# Patient Record
Sex: MALE | Race: WHITE | NOT HISPANIC OR LATINO | Employment: OTHER | URBAN - METROPOLITAN AREA
[De-identification: names, ages, dates, MRNs, and addresses within clinical notes are randomized per-mention and may not be internally consistent; named-entity substitution may affect disease eponyms.]

---

## 2017-12-12 ENCOUNTER — HOSPITAL ENCOUNTER (OUTPATIENT)
Dept: RADIOLOGY | Facility: MEDICAL CENTER | Age: 47
End: 2017-12-12
Attending: FAMILY MEDICINE
Payer: COMMERCIAL

## 2017-12-12 DIAGNOSIS — R10.11 RIGHT UPPER QUADRANT PAIN: ICD-10-CM

## 2017-12-12 PROCEDURE — 76700 US EXAM ABDOM COMPLETE: CPT

## 2018-06-30 ENCOUNTER — HOSPITAL ENCOUNTER (INPATIENT)
Facility: MEDICAL CENTER | Age: 48
LOS: 6 days | DRG: 475 | End: 2018-07-06
Attending: EMERGENCY MEDICINE | Admitting: HOSPITALIST
Payer: COMMERCIAL

## 2018-06-30 ENCOUNTER — APPOINTMENT (OUTPATIENT)
Dept: RADIOLOGY | Facility: MEDICAL CENTER | Age: 48
DRG: 475 | End: 2018-06-30
Attending: EMERGENCY MEDICINE
Payer: COMMERCIAL

## 2018-06-30 DIAGNOSIS — T14.8XXA OPEN WOUND: ICD-10-CM

## 2018-06-30 DIAGNOSIS — Z89.431 S/P TRANSMETATARSAL AMPUTATION OF FOOT, RIGHT (HCC): ICD-10-CM

## 2018-06-30 DIAGNOSIS — M86.271 SUBACUTE OSTEOMYELITIS OF RIGHT FOOT (HCC): ICD-10-CM

## 2018-06-30 DIAGNOSIS — E11.628 TYPE 2 DIABETES MELLITUS WITH OTHER SKIN COMPLICATION, WITHOUT LONG-TERM CURRENT USE OF INSULIN (HCC): ICD-10-CM

## 2018-06-30 DIAGNOSIS — Z89.411 STATUS POST AMPUTATION OF GREAT TOE, RIGHT (HCC): ICD-10-CM

## 2018-06-30 DIAGNOSIS — E86.0 DEHYDRATION: ICD-10-CM

## 2018-06-30 PROBLEM — E66.3 OVERWEIGHT (BMI 25.0-29.9): Status: ACTIVE | Noted: 2018-06-30

## 2018-06-30 PROBLEM — I10 DIABETES MELLITUS WITH COINCIDENT HYPERTENSION (HCC): Status: ACTIVE | Noted: 2018-06-30

## 2018-06-30 PROBLEM — D64.9 NORMOCYTIC ANEMIA: Status: ACTIVE | Noted: 2018-06-30

## 2018-06-30 PROBLEM — E11.9 DIABETES MELLITUS WITH COINCIDENT HYPERTENSION (HCC): Status: ACTIVE | Noted: 2018-06-30

## 2018-06-30 PROBLEM — E11.65 TYPE 2 DIABETES MELLITUS WITH HYPERGLYCEMIA, WITHOUT LONG-TERM CURRENT USE OF INSULIN (HCC): Status: ACTIVE | Noted: 2018-06-30

## 2018-06-30 LAB
ALBUMIN SERPL BCP-MCNC: 2.7 G/DL (ref 3.2–4.9)
ALBUMIN/GLOB SERPL: 0.7 G/DL
ALP SERPL-CCNC: 39 U/L (ref 30–99)
ALT SERPL-CCNC: 8 U/L (ref 2–50)
ANION GAP SERPL CALC-SCNC: 13 MMOL/L (ref 0–11.9)
AST SERPL-CCNC: 12 U/L (ref 12–45)
B-OH-BUTYR SERPL-MCNC: 3.16 MMOL/L (ref 0.02–0.27)
BASE EXCESS BLDV CALC-SCNC: -1 MMOL/L
BASOPHILS # BLD AUTO: 0.7 % (ref 0–1.8)
BASOPHILS # BLD: 0.07 K/UL (ref 0–0.12)
BILIRUB SERPL-MCNC: 0.7 MG/DL (ref 0.1–1.5)
BODY TEMPERATURE: ABNORMAL CENTIGRADE
BUN SERPL-MCNC: 7 MG/DL (ref 8–22)
CALCIUM SERPL-MCNC: 8.4 MG/DL (ref 8.5–10.5)
CHLORIDE SERPL-SCNC: 102 MMOL/L (ref 96–112)
CO2 SERPL-SCNC: 23 MMOL/L (ref 20–33)
CREAT SERPL-MCNC: 0.89 MG/DL (ref 0.5–1.4)
CRP SERPL HS-MCNC: 7.23 MG/DL (ref 0–0.75)
EOSINOPHIL # BLD AUTO: 0.29 K/UL (ref 0–0.51)
EOSINOPHIL NFR BLD: 2.9 % (ref 0–6.9)
ERYTHROCYTE [DISTWIDTH] IN BLOOD BY AUTOMATED COUNT: 36.3 FL (ref 35.9–50)
GLOBULIN SER CALC-MCNC: 3.8 G/DL (ref 1.9–3.5)
GLUCOSE SERPL-MCNC: 133 MG/DL (ref 65–99)
HCO3 BLDV-SCNC: 23 MMOL/L (ref 24–28)
HCT VFR BLD AUTO: 38.2 % (ref 42–52)
HGB BLD-MCNC: 12.9 G/DL (ref 14–18)
IMM GRANULOCYTES # BLD AUTO: 0.05 K/UL (ref 0–0.11)
IMM GRANULOCYTES NFR BLD AUTO: 0.5 % (ref 0–0.9)
LACTATE BLD-SCNC: 1.7 MMOL/L (ref 0.5–2)
LYMPHOCYTES # BLD AUTO: 2.16 K/UL (ref 1–4.8)
LYMPHOCYTES NFR BLD: 22 % (ref 22–41)
MCH RBC QN AUTO: 28.5 PG (ref 27–33)
MCHC RBC AUTO-ENTMCNC: 33.8 G/DL (ref 33.7–35.3)
MCV RBC AUTO: 84.5 FL (ref 81.4–97.8)
MONOCYTES # BLD AUTO: 1.01 K/UL (ref 0–0.85)
MONOCYTES NFR BLD AUTO: 10.3 % (ref 0–13.4)
NEUTROPHILS # BLD AUTO: 6.26 K/UL (ref 1.82–7.42)
NEUTROPHILS NFR BLD: 63.6 % (ref 44–72)
NRBC # BLD AUTO: 0 K/UL
NRBC BLD-RTO: 0 /100 WBC
PCO2 BLDV: 35.2 MMHG (ref 41–51)
PH BLDV: 7.43 [PH] (ref 7.31–7.45)
PLATELET # BLD AUTO: 308 K/UL (ref 164–446)
PMV BLD AUTO: 9.4 FL (ref 9–12.9)
PO2 BLDV: 36.7 MMHG (ref 25–40)
POTASSIUM SERPL-SCNC: 3.8 MMOL/L (ref 3.6–5.5)
PROT SERPL-MCNC: 6.5 G/DL (ref 6–8.2)
RBC # BLD AUTO: 4.52 M/UL (ref 4.7–6.1)
SAO2 % BLDV: 71.7 %
SODIUM SERPL-SCNC: 138 MMOL/L (ref 135–145)
WBC # BLD AUTO: 9.8 K/UL (ref 4.8–10.8)

## 2018-06-30 PROCEDURE — 700111 HCHG RX REV CODE 636 W/ 250 OVERRIDE (IP): Performed by: EMERGENCY MEDICINE

## 2018-06-30 PROCEDURE — 80053 COMPREHEN METABOLIC PANEL: CPT

## 2018-06-30 PROCEDURE — 83605 ASSAY OF LACTIC ACID: CPT

## 2018-06-30 PROCEDURE — 83036 HEMOGLOBIN GLYCOSYLATED A1C: CPT

## 2018-06-30 PROCEDURE — 82803 BLOOD GASES ANY COMBINATION: CPT

## 2018-06-30 PROCEDURE — 700105 HCHG RX REV CODE 258: Performed by: EMERGENCY MEDICINE

## 2018-06-30 PROCEDURE — 85652 RBC SED RATE AUTOMATED: CPT

## 2018-06-30 PROCEDURE — 99285 EMERGENCY DEPT VISIT HI MDM: CPT

## 2018-06-30 PROCEDURE — 73630 X-RAY EXAM OF FOOT: CPT | Mod: RT

## 2018-06-30 PROCEDURE — 96375 TX/PRO/DX INJ NEW DRUG ADDON: CPT

## 2018-06-30 PROCEDURE — 99223 1ST HOSP IP/OBS HIGH 75: CPT | Performed by: HOSPITALIST

## 2018-06-30 PROCEDURE — 36415 COLL VENOUS BLD VENIPUNCTURE: CPT

## 2018-06-30 PROCEDURE — 85025 COMPLETE CBC W/AUTO DIFF WBC: CPT

## 2018-06-30 PROCEDURE — 87040 BLOOD CULTURE FOR BACTERIA: CPT | Mod: 91

## 2018-06-30 PROCEDURE — 770006 HCHG ROOM/CARE - MED/SURG/GYN SEMI*

## 2018-06-30 PROCEDURE — 86140 C-REACTIVE PROTEIN: CPT

## 2018-06-30 PROCEDURE — 96361 HYDRATE IV INFUSION ADD-ON: CPT

## 2018-06-30 PROCEDURE — 82010 KETONE BODYS QUAN: CPT

## 2018-06-30 RX ORDER — SODIUM CHLORIDE 9 MG/ML
1000 INJECTION, SOLUTION INTRAVENOUS ONCE
Status: COMPLETED | OUTPATIENT
Start: 2018-06-30 | End: 2018-06-30

## 2018-06-30 RX ORDER — OXYCODONE HYDROCHLORIDE 5 MG/1
2.5 TABLET ORAL
Status: DISCONTINUED | OUTPATIENT
Start: 2018-06-30 | End: 2018-07-01

## 2018-06-30 RX ORDER — DEXTROSE MONOHYDRATE 25 G/50ML
25 INJECTION, SOLUTION INTRAVENOUS
Status: DISCONTINUED | OUTPATIENT
Start: 2018-06-30 | End: 2018-07-06 | Stop reason: HOSPADM

## 2018-06-30 RX ORDER — GLYBURIDE 5 MG/1
5 TABLET ORAL
COMMUNITY
End: 2018-08-15

## 2018-06-30 RX ORDER — PROMETHAZINE HYDROCHLORIDE 12.5 MG/1
12.5-25 SUPPOSITORY RECTAL EVERY 4 HOURS PRN
Status: DISCONTINUED | OUTPATIENT
Start: 2018-06-30 | End: 2018-07-06 | Stop reason: HOSPADM

## 2018-06-30 RX ORDER — SODIUM CHLORIDE 9 MG/ML
INJECTION, SOLUTION INTRAVENOUS CONTINUOUS
Status: DISCONTINUED | OUTPATIENT
Start: 2018-07-01 | End: 2018-07-03

## 2018-06-30 RX ORDER — CLONIDINE HYDROCHLORIDE 0.1 MG/1
0.1 TABLET ORAL EVERY 6 HOURS PRN
Status: DISCONTINUED | OUTPATIENT
Start: 2018-06-30 | End: 2018-07-06 | Stop reason: HOSPADM

## 2018-06-30 RX ORDER — MORPHINE SULFATE 2 MG/ML
2 INJECTION, SOLUTION INTRAMUSCULAR; INTRAVENOUS
Status: DISCONTINUED | OUTPATIENT
Start: 2018-06-30 | End: 2018-07-01

## 2018-06-30 RX ORDER — AMOXICILLIN 250 MG
2 CAPSULE ORAL 2 TIMES DAILY
Status: DISCONTINUED | OUTPATIENT
Start: 2018-07-01 | End: 2018-07-06 | Stop reason: HOSPADM

## 2018-06-30 RX ORDER — ONDANSETRON 4 MG/1
4 TABLET, ORALLY DISINTEGRATING ORAL EVERY 4 HOURS PRN
Status: DISCONTINUED | OUTPATIENT
Start: 2018-06-30 | End: 2018-07-06 | Stop reason: HOSPADM

## 2018-06-30 RX ORDER — ACETAMINOPHEN 325 MG/1
650 TABLET ORAL EVERY 6 HOURS PRN
Status: DISCONTINUED | OUTPATIENT
Start: 2018-06-30 | End: 2018-07-06 | Stop reason: HOSPADM

## 2018-06-30 RX ORDER — MORPHINE SULFATE 10 MG/ML
6 INJECTION, SOLUTION INTRAMUSCULAR; INTRAVENOUS ONCE
Status: COMPLETED | OUTPATIENT
Start: 2018-06-30 | End: 2018-06-30

## 2018-06-30 RX ORDER — OMEPRAZOLE 20 MG/1
20 CAPSULE, DELAYED RELEASE ORAL EVERY MORNING
COMMUNITY

## 2018-06-30 RX ORDER — HYDROCODONE BITARTRATE AND ACETAMINOPHEN 5; 325 MG/1; MG/1
1-2 TABLET ORAL EVERY 4 HOURS PRN
COMMUNITY
End: 2018-08-15

## 2018-06-30 RX ORDER — ONDANSETRON 2 MG/ML
4 INJECTION INTRAMUSCULAR; INTRAVENOUS EVERY 4 HOURS PRN
Status: DISCONTINUED | OUTPATIENT
Start: 2018-06-30 | End: 2018-07-06 | Stop reason: HOSPADM

## 2018-06-30 RX ORDER — PROMETHAZINE HYDROCHLORIDE 25 MG/1
12.5-25 TABLET ORAL EVERY 4 HOURS PRN
Status: DISCONTINUED | OUTPATIENT
Start: 2018-06-30 | End: 2018-07-06 | Stop reason: HOSPADM

## 2018-06-30 RX ORDER — BISACODYL 10 MG
10 SUPPOSITORY, RECTAL RECTAL
Status: DISCONTINUED | OUTPATIENT
Start: 2018-06-30 | End: 2018-07-06 | Stop reason: HOSPADM

## 2018-06-30 RX ORDER — POLYETHYLENE GLYCOL 3350 17 G/17G
1 POWDER, FOR SOLUTION ORAL
Status: DISCONTINUED | OUTPATIENT
Start: 2018-06-30 | End: 2018-07-06 | Stop reason: HOSPADM

## 2018-06-30 RX ORDER — SULFAMETHOXAZOLE AND TRIMETHOPRIM 800; 160 MG/1; MG/1
1 TABLET ORAL 2 TIMES DAILY
Status: ON HOLD | COMMUNITY
Start: 2018-06-29 | End: 2018-07-06

## 2018-06-30 RX ORDER — INSULIN GLARGINE 100 [IU]/ML
10 INJECTION, SOLUTION SUBCUTANEOUS EVERY EVENING
Status: DISCONTINUED | OUTPATIENT
Start: 2018-07-01 | End: 2018-07-06 | Stop reason: HOSPADM

## 2018-06-30 RX ORDER — OXYCODONE HYDROCHLORIDE 5 MG/1
5 TABLET ORAL
Status: DISCONTINUED | OUTPATIENT
Start: 2018-06-30 | End: 2018-07-01

## 2018-06-30 RX ADMIN — SODIUM CHLORIDE 1000 ML: 9 INJECTION, SOLUTION INTRAVENOUS at 21:44

## 2018-06-30 RX ADMIN — MORPHINE SULFATE 6 MG: 10 INJECTION INTRAVENOUS at 23:39

## 2018-06-30 ASSESSMENT — ENCOUNTER SYMPTOMS
CONSTITUTIONAL NEGATIVE: 1
GASTROINTESTINAL NEGATIVE: 1
RESPIRATORY NEGATIVE: 1
EYES NEGATIVE: 1
NEUROLOGICAL NEGATIVE: 1
PSYCHIATRIC NEGATIVE: 1
CARDIOVASCULAR NEGATIVE: 1

## 2018-06-30 ASSESSMENT — PAIN SCALES - GENERAL: PAINLEVEL_OUTOF10: 4

## 2018-07-01 LAB
APPEARANCE UR: CLEAR
BACTERIA #/AREA URNS HPF: ABNORMAL /HPF
BILIRUB UR QL STRIP.AUTO: NEGATIVE
COLOR UR: YELLOW
EPI CELLS #/AREA URNS HPF: ABNORMAL /HPF
ERYTHROCYTE [SEDIMENTATION RATE] IN BLOOD BY WESTERGREN METHOD: 82 MM/HOUR (ref 0–15)
EST. AVERAGE GLUCOSE BLD GHB EST-MCNC: 344 MG/DL
GLUCOSE BLD-MCNC: 159 MG/DL (ref 65–99)
GLUCOSE BLD-MCNC: 161 MG/DL (ref 65–99)
GLUCOSE BLD-MCNC: 180 MG/DL (ref 65–99)
GLUCOSE BLD-MCNC: 206 MG/DL (ref 65–99)
GLUCOSE UR STRIP.AUTO-MCNC: 500 MG/DL
HBA1C MFR BLD: 13.6 % (ref 0–5.6)
KETONES UR STRIP.AUTO-MCNC: >=80 MG/DL
LEUKOCYTE ESTERASE UR QL STRIP.AUTO: NEGATIVE
MICRO URNS: ABNORMAL
NITRITE UR QL STRIP.AUTO: NEGATIVE
PH UR STRIP.AUTO: 6 [PH]
PROT UR QL STRIP: 100 MG/DL
RBC # URNS HPF: ABNORMAL /HPF
RBC UR QL AUTO: ABNORMAL
SP GR UR STRIP.AUTO: 1.02
UROBILINOGEN UR STRIP.AUTO-MCNC: 0.2 MG/DL
WBC #/AREA URNS HPF: ABNORMAL /HPF

## 2018-07-01 PROCEDURE — A9270 NON-COVERED ITEM OR SERVICE: HCPCS | Performed by: HOSPITALIST

## 2018-07-01 PROCEDURE — 700111 HCHG RX REV CODE 636 W/ 250 OVERRIDE (IP): Performed by: EMERGENCY MEDICINE

## 2018-07-01 PROCEDURE — 96365 THER/PROPH/DIAG IV INF INIT: CPT

## 2018-07-01 PROCEDURE — 87205 SMEAR GRAM STAIN: CPT

## 2018-07-01 PROCEDURE — 87070 CULTURE OTHR SPECIMN AEROBIC: CPT

## 2018-07-01 PROCEDURE — 700111 HCHG RX REV CODE 636 W/ 250 OVERRIDE (IP): Performed by: HOSPITALIST

## 2018-07-01 PROCEDURE — 99223 1ST HOSP IP/OBS HIGH 75: CPT | Performed by: INTERNAL MEDICINE

## 2018-07-01 PROCEDURE — 82962 GLUCOSE BLOOD TEST: CPT

## 2018-07-01 PROCEDURE — 700105 HCHG RX REV CODE 258: Performed by: HOSPITALIST

## 2018-07-01 PROCEDURE — 700102 HCHG RX REV CODE 250 W/ 637 OVERRIDE(OP): Performed by: HOSPITALIST

## 2018-07-01 PROCEDURE — 770006 HCHG ROOM/CARE - MED/SURG/GYN SEMI*

## 2018-07-01 PROCEDURE — 81001 URINALYSIS AUTO W/SCOPE: CPT

## 2018-07-01 PROCEDURE — 99233 SBSQ HOSP IP/OBS HIGH 50: CPT | Performed by: HOSPITALIST

## 2018-07-01 PROCEDURE — 700105 HCHG RX REV CODE 258: Performed by: EMERGENCY MEDICINE

## 2018-07-01 RX ORDER — OXYCODONE HYDROCHLORIDE 5 MG/1
2.5 TABLET ORAL
Status: DISCONTINUED | OUTPATIENT
Start: 2018-07-01 | End: 2018-07-06 | Stop reason: HOSPADM

## 2018-07-01 RX ORDER — OXYCODONE HYDROCHLORIDE 5 MG/1
5 TABLET ORAL
Status: DISCONTINUED | OUTPATIENT
Start: 2018-07-01 | End: 2018-07-06 | Stop reason: HOSPADM

## 2018-07-01 RX ORDER — MORPHINE SULFATE 4 MG/ML
2 INJECTION, SOLUTION INTRAMUSCULAR; INTRAVENOUS
Status: DISCONTINUED | OUTPATIENT
Start: 2018-07-01 | End: 2018-07-06 | Stop reason: HOSPADM

## 2018-07-01 RX ADMIN — INSULIN LISPRO 4 UNITS: 100 INJECTION, SOLUTION INTRAVENOUS; SUBCUTANEOUS at 16:08

## 2018-07-01 RX ADMIN — INSULIN LISPRO 4 UNITS: 100 INJECTION, SOLUTION INTRAVENOUS; SUBCUTANEOUS at 06:08

## 2018-07-01 RX ADMIN — SODIUM CHLORIDE: 9 INJECTION, SOLUTION INTRAVENOUS at 21:31

## 2018-07-01 RX ADMIN — INSULIN GLARGINE 10 UNITS: 100 INJECTION, SOLUTION SUBCUTANEOUS at 20:06

## 2018-07-01 RX ADMIN — OXYCODONE HYDROCHLORIDE 5 MG: 5 TABLET ORAL at 08:33

## 2018-07-01 RX ADMIN — SODIUM CHLORIDE: 9 INJECTION, SOLUTION INTRAVENOUS at 01:12

## 2018-07-01 RX ADMIN — AMPICILLIN SODIUM AND SULBACTAM SODIUM 3 G: 2; 1 INJECTION, POWDER, FOR SOLUTION INTRAMUSCULAR; INTRAVENOUS at 06:05

## 2018-07-01 RX ADMIN — VANCOMYCIN HYDROCHLORIDE 2400 MG: 100 INJECTION, POWDER, LYOPHILIZED, FOR SOLUTION INTRAVENOUS at 02:22

## 2018-07-01 RX ADMIN — INSULIN LISPRO 2 UNITS: 100 INJECTION, SOLUTION INTRAVENOUS; SUBCUTANEOUS at 20:05

## 2018-07-01 RX ADMIN — INSULIN LISPRO 4 UNITS: 100 INJECTION, SOLUTION INTRAVENOUS; SUBCUTANEOUS at 11:06

## 2018-07-01 RX ADMIN — OXYCODONE HYDROCHLORIDE 5 MG: 5 TABLET ORAL at 11:39

## 2018-07-01 RX ADMIN — INSULIN LISPRO 1 UNITS: 100 INJECTION, SOLUTION INTRAVENOUS; SUBCUTANEOUS at 11:05

## 2018-07-01 RX ADMIN — OXYCODONE HYDROCHLORIDE 5 MG: 5 TABLET ORAL at 20:36

## 2018-07-01 RX ADMIN — INSULIN LISPRO 1 UNITS: 100 INJECTION, SOLUTION INTRAVENOUS; SUBCUTANEOUS at 16:08

## 2018-07-01 RX ADMIN — AMPICILLIN SODIUM AND SULBACTAM SODIUM 3 G: 2; 1 INJECTION, POWDER, FOR SOLUTION INTRAMUSCULAR; INTRAVENOUS at 01:12

## 2018-07-01 RX ADMIN — AMPICILLIN SODIUM AND SULBACTAM SODIUM 3 G: 2; 1 INJECTION, POWDER, FOR SOLUTION INTRAMUSCULAR; INTRAVENOUS at 17:32

## 2018-07-01 RX ADMIN — VANCOMYCIN HYDROCHLORIDE 1600 MG: 100 INJECTION, POWDER, LYOPHILIZED, FOR SOLUTION INTRAVENOUS at 13:14

## 2018-07-01 RX ADMIN — AMPICILLIN SODIUM AND SULBACTAM SODIUM 3 G: 2; 1 INJECTION, POWDER, FOR SOLUTION INTRAMUSCULAR; INTRAVENOUS at 23:14

## 2018-07-01 RX ADMIN — OXYCODONE HYDROCHLORIDE 5 MG: 5 TABLET ORAL at 14:24

## 2018-07-01 RX ADMIN — CLONIDINE HYDROCHLORIDE 0.1 MG: 0.1 TABLET ORAL at 20:54

## 2018-07-01 RX ADMIN — OXYCODONE HYDROCHLORIDE 5 MG: 5 TABLET ORAL at 17:32

## 2018-07-01 RX ADMIN — INSULIN LISPRO 1 UNITS: 100 INJECTION, SOLUTION INTRAVENOUS; SUBCUTANEOUS at 06:07

## 2018-07-01 RX ADMIN — PIPERACILLIN AND TAZOBACTAM 4.5 G: 4; .5 INJECTION, POWDER, LYOPHILIZED, FOR SOLUTION INTRAVENOUS; PARENTERAL at 00:06

## 2018-07-01 RX ADMIN — AMPICILLIN SODIUM AND SULBACTAM SODIUM 3 G: 2; 1 INJECTION, POWDER, FOR SOLUTION INTRAMUSCULAR; INTRAVENOUS at 11:01

## 2018-07-01 RX ADMIN — OXYCODONE HYDROCHLORIDE 5 MG: 5 TABLET ORAL at 04:50

## 2018-07-01 ASSESSMENT — ENCOUNTER SYMPTOMS
SENSORY CHANGE: 0
ABDOMINAL PAIN: 0
DIARRHEA: 0
EYE DISCHARGE: 0
MYALGIAS: 0
EYE PAIN: 0
SORE THROAT: 0
BRUISES/BLEEDS EASILY: 0
FEVER: 0
COUGH: 0
DIAPHORESIS: 0
HEADACHES: 0
CLAUDICATION: 0
DEPRESSION: 0
SPEECH CHANGE: 0
NECK PAIN: 0
VOMITING: 0
BACK PAIN: 0
NAUSEA: 0
WEAKNESS: 0
HEMOPTYSIS: 0
WHEEZING: 0
SPUTUM PRODUCTION: 0
SHORTNESS OF BREATH: 0
PALPITATIONS: 0
CONSTIPATION: 0
LOSS OF CONSCIOUSNESS: 0
DIZZINESS: 0
FOCAL WEAKNESS: 0
CHILLS: 0

## 2018-07-01 ASSESSMENT — PAIN SCALES - GENERAL
PAINLEVEL_OUTOF10: 7
PAINLEVEL_OUTOF10: 6
PAINLEVEL_OUTOF10: 3
PAINLEVEL_OUTOF10: 7
PAINLEVEL_OUTOF10: 1
PAINLEVEL_OUTOF10: 7
PAINLEVEL_OUTOF10: 5
PAINLEVEL_OUTOF10: 6

## 2018-07-01 ASSESSMENT — COGNITIVE AND FUNCTIONAL STATUS - GENERAL
MOBILITY SCORE: 23
SUGGESTED CMS G CODE MODIFIER MOBILITY: CI
SUGGESTED CMS G CODE MODIFIER DAILY ACTIVITY: CH
SUGGESTED CMS G CODE MODIFIER MOBILITY: CJ
WALKING IN HOSPITAL ROOM: A LITTLE
DAILY ACTIVITIY SCORE: 24
WALKING IN HOSPITAL ROOM: A LITTLE
MOBILITY SCORE: 22
CLIMB 3 TO 5 STEPS WITH RAILING: A LITTLE

## 2018-07-01 ASSESSMENT — LIFESTYLE VARIABLES
SUBSTANCE_ABUSE: 0
ALCOHOL_USE: NO
EVER_SMOKED: YES

## 2018-07-01 ASSESSMENT — PATIENT HEALTH QUESTIONNAIRE - PHQ9
1. LITTLE INTEREST OR PLEASURE IN DOING THINGS: NOT AT ALL
SUM OF ALL RESPONSES TO PHQ9 QUESTIONS 1 AND 2: 0
2. FEELING DOWN, DEPRESSED, IRRITABLE, OR HOPELESS: NOT AT ALL

## 2018-07-01 NOTE — ED TRIAGE NOTES
"Beni Moore  47 y.o. male  Chief Complaint   Patient presents with   • Other     \"I had a toe amputated yesterday and they want me to get a wound vac here and care here until my pcp can set up home health care.\" Pt states his pain is tolerable with the Norco he takes at home. Pt denies any concerns with the wound itself, but states he was sent here for the wound vac due to inability to receive homehealth care at this time.        Pt to triage via WC.   Pt is alert and oriented, speaking in full sentences, follows commands and responds appropriately to questions. NAD. Resp are even and unlabored.  Pt placed in lobby. Pt educated on triage process. Pt encouraged to alert staff for any changes.    "

## 2018-07-01 NOTE — ASSESSMENT & PLAN NOTE
Transmetatarsal amputation of right great toe, open wound, bleeding noted from metatarsal stump.  Patient was apparently told to get a wound vac placed after being discharged. limb preservation service consult appreciated.  NPO at MN for definite need for OR debridement in a.m.  coags ordered.  Hold dvt prophylaxis in a.m.  Xray neg for OM, but elevated CRP 7.23, sed rate and BCs x 2 ordered .  s/p amputation

## 2018-07-01 NOTE — ED PROVIDER NOTES
"ED PROVIDER NOTE     Scribed for Dick Cox M.D. by Varun Vera. 6/30/2018, 9:07 PM.    CHIEF COMPLAINT  Chief Complaint   Patient presents with   • Other     \"I had a toe amputated yesterday and they want me to get a wound vac here and care here until my pcp can set up home health care.\" Pt states his pain is tolerable with the Norco he takes at home. Pt denies any concerns with the wound itself, but states he was sent here for the wound vac due to inability to receive homehealth care at this time.      HPI    Primary care provider: Nolan Buchanan D.O.  Means of arrival: Walk-in  History obtained from: Patient  History limited by: None    Beni Moore is a 47 y.o. male with a history of diabetes who presents for evaluation of right great toe amputation yesterday with open wound. Per patient, 1-2 weeks ago he cut his foot on the threshold of a sliding glass door. The patient's wife has been cleaning this wound but the wound had been worsening. Patient explains that [place] wound became infected. Patient reports that he was recently at [facility] in [place] yesterday had his toe amputated secondary to the infection. He reports that he was sent home with an open wound and was advised to go the [facility] for a wound vac as he lives here and wanted to set up long-term care here close to home. He was also sent home this morning with a prescription of Bactrim and a prescription for Norco. Patient is followed by Dr. Buchanan. Upon evaluation, the patient is in 2/10 pain but does admit that he has taken Norco for his symptoms which has given significant relief; mild, dull achy nonradiating pain. He denies any allergies to antibiotics and also denies any dysuria, shortness of breath, vomiting, chest pain, or back pain. Patient further explains that he has a history of chronic diarrhea over the last 2 years. Patient is followed by Dr. Orona with GI specialists for " this and he is reportedly going to [facility] for further evlauation of this soon. He only takes Glyburide for his diabetes. Sugars were reportedly well controlled prior to discharge from outside hospital earlier today. No prior episodes.     REVIEW OF SYSTEMS  Constitutional: Negative for fever or chills.   Respiratory: Negative for shortness of breath.    Cardiovascular: Negative for chest pain  Gastrointestinal: Negative for nausea, vomiting. Positive for chronic diarrhea.  Genitourinary: Negative for dysuria   Musculoskeletal: Positive for right big toe amputation with open wound.   All other systems reviewed and are negative.    C.     PAST MEDICAL HISTORY   has a past medical history of Diabetes ([facility]).    PAST FAMILY HISTORY  Family History   Problem Relation Age of Onset   • No Known Problems Mother    • Diabetes Father    • Heart Disease Father        SOCIAL HISTORY  Social History     Social History Main Topics   • Smoking status: None noted   • Smokeless tobacco: None noted   • Alcohol use None noted   • Drug use: None noted   • Sexual activity: None noted       SURGICAL HISTORY   has a past surgical history that includes other abdominal surgery; other orthopedic surgery; and other.    CURRENT MEDICATIONS  Home Medications     Reviewed by Ahmet Guillory (Pharmacy Tech) on 06/30/18 at [facility]  Med List Status: Complete   Medication Last Dose Status   glyBURIDE (DIABETA) 5 MG Tab 6/30/2018 Active   HYDROcodone-acetaminophen (NORCO) 5-325 MG Tab per tablet 6/30/2018 Active   omeprazole (PRILOSEC) 20 MG delayed-release capsule 6/30/2018 Active   sulfamethoxazole-trimethoprim (BACTRIM DS) 800-160 MG tablet 6/30/2018 Active                ALLERGIES  No Known Allergies    PHYSICAL EXAM  VITAL SIGNS: /93   Pulse 100   Temp 37.2 °C (98.9 °F)   Resp 16   Ht 1.829 m (6')   Wt 97.5 kg (215 lb)   SpO2 97%   BMI 29.16 kg/m²    Pulse ox interpretation: On room air, I interpret this pulse ox as  normal.  Constitutional: Sitting up. Slightly uncomfortable appearing.   HEENT: Normocephalic, atraumatic. Posterior pharynx clear, slightly dry mucous membranes .  Eyes:  EOMI. Pale conjunctiva.   Neck: Supple, Full range of motion, nontender.  Chest/Pulmonary: Clear to ausculation bilaterally, no wheezes or rhonchi.  Cardiovascular: Regular rate and rhythm, no murmur.   Abdomen: Soft, nontender, no rebound, guarding, or masses.  Back: No CVA tenderness, nontender midline, no step offs.  Musculoskeletal:  Right foot erythema up to midfoot. Open great toe amputation site with exposed muscle and bone, no drainage. Wet to try dressings were removed prior to my evaluation and then replaced in sterile fashion.  Neuro: Clear speech, normal coordination, cranial nerves II-XII grossly intact.  Psych: Normal mood and affect.  Skin: Erythema to R midfoot, warm and dry.    DIAGNOSTIC STUDIES / PROCEDURES    LABS & EKG  Results for orders placed or performed during the hospital encounter of 06/30/18   BLOOD CULTURE x2   Result Value Ref Range    Significant Indicator NEG     Source BLD     Site PERIPHERAL     Blood Culture       No Growth    Note: Blood cultures are incubated for 5 days and  are monitored continuously.Positive blood cultures  are called to the RN and reported as soon as  they are identified.     BLOOD CULTURE x2   Result Value Ref Range    Significant Indicator NEG     Source BLD     Site PERIPHERAL     Blood Culture       No Growth    Note: Blood cultures are incubated for 5 days and  are monitored continuously.Positive blood cultures  are called to the RN and reported as soon as  they are identified.     CBC WITH DIFFERENTIAL   Result Value Ref Range    WBC 9.8 4.8 - 10.8 K/uL    RBC 4.52 (L) 4.70 - 6.10 M/uL    Hemoglobin 12.9 (L) 14.0 - 18.0 g/dL    Hematocrit 38.2 (L) 42.0 - 52.0 %    MCV 84.5 81.4 - 97.8 fL    MCH 28.5 27.0 - 33.0 pg    MCHC 33.8 33.7 - 35.3 g/dL    RDW 36.3 35.9 - 50.0 fL    Platelet  Count 308 164 - 446 K/uL    MPV 9.4 9.0 - 12.9 fL    Neutrophils-Polys 63.60 44.00 - 72.00 %    Lymphocytes 22.00 22.00 - 41.00 %    Monocytes 10.30 0.00 - 13.40 %    Eosinophils 2.90 0.00 - 6.90 %    Basophils 0.70 0.00 - 1.80 %    Immature Granulocytes 0.50 0.00 - 0.90 %    Nucleated RBC 0.00 /100 WBC    Neutrophils (Absolute) 6.26 1.82 - 7.42 K/uL    Lymphs (Absolute) 2.16 1.00 - 4.80 K/uL    Monos (Absolute) 1.01 (H) 0.00 - 0.85 K/uL    Eos (Absolute) 0.29 0.00 - 0.51 K/uL    Baso (Absolute) 0.07 0.00 - 0.12 K/uL    Immature Granulocytes (abs) 0.05 0.00 - 0.11 K/uL    NRBC (Absolute) 0.00 K/uL   CRP QUANTITIVE (NON-CARDIAC)   Result Value Ref Range    Stat C-Reactive Protein 7.23 (H) 0.00 - 0.75 mg/dL   CMP   Result Value Ref Range    Sodium 138 135 - 145 mmol/L    Potassium 3.8 3.6 - 5.5 mmol/L    Chloride 102 96 - 112 mmol/L    Co2 23 20 - 33 mmol/L    Anion Gap 13.0 (H) 0.0 - 11.9    Glucose 133 (H) 65 - 99 mg/dL    Bun 7 (L) 8 - 22 mg/dL    Creatinine 0.89 0.50 - 1.40 mg/dL    Calcium 8.4 (L) 8.5 - 10.5 mg/dL    AST(SGOT) 12 12 - 45 U/L    ALT(SGPT) 8 2 - 50 U/L    Alkaline Phosphatase 39 30 - 99 U/L    Total Bilirubin 0.7 0.1 - 1.5 mg/dL    Albumin 2.7 (L) 3.2 - 4.9 g/dL    Total Protein 6.5 6.0 - 8.2 g/dL    Globulin 3.8 (H) 1.9 - 3.5 g/dL    A-G Ratio 0.7 g/dL   LACTIC ACID   Result Value Ref Range    Lactic Acid 1.7 0.5 - 2.0 mmol/L   URINALYSIS,CULTURE IF INDICATED   Result Value Ref Range    Color Yellow     Character Clear     Specific Gravity 1.020 <1.035    Ph 6.0 5.0 - 8.0    Glucose 500 (A) Negative mg/dL    Ketones >=80 (A) Negative mg/dL    Protein 100 (A) Negative mg/dL    Bilirubin Negative Negative    Urobilinogen, Urine 0.2 Negative    Nitrite Negative Negative    Leukocyte Esterase Negative Negative    Occult Blood Moderate (A) Negative    Micro Urine Req Microscopic    BETA-HYDROXYBUTYRIC ACID   Result Value Ref Range    beta-Hydroxybutyric Acid 3.16 (H) 0.02 - 0.27 mmol/L   VENOUS BLOOD  GAS   Result Value Ref Range    Venous Bg Ph 7.43 7.31 - 7.45    Venous Bg Pco2 35.2 (L) 41.0 - 51.0 mmHg    Venous Bg Po2 36.7 25.0 - 40.0 mmHg    Venous Bg O2 Saturation 71.7 %    Venous Bg Hco3 23 (L) 24 - 28 mmol/L    Venous Bg Base Excess -1 mmol/L    Body Temp see below Centigrade   ESTIMATED GFR   Result Value Ref Range    GFR If African American >60 >60 mL/min/1.73 m 2    GFR If Non African American >60 >60 mL/min/1.73 m 2   URINE MICROSCOPIC (W/UA)   Result Value Ref Range    WBC 0-2 (A) /hpf    RBC 10-20 (A) /hpf    Bacteria Rare (A) None /hpf    Epithelial Cells Rare /hpf   WESTERGREN SED RATE   Result Value Ref Range    Sed Rate Westergren 82 (H) 0 - 15 mm/hour   HEMOGLOBIN A1C   Result Value Ref Range    Glycohemoglobin 13.6 (H) 0.0 - 5.6 %    Est Avg Glucose 344 mg/dL   ACCU-CHEK GLUCOSE   Result Value Ref Range    Glucose - Accu-Ck 161 (H) 65 - 99 mg/dL   ACCU-CHEK GLUCOSE   Result Value Ref Range    Glucose - Accu-Ck 159 (H) 65 - 99 mg/dL         RADIOLOGY  DX-FOOT-COMPLETE 3+ RIGHT   Final Result         1.  No acute traumatic bony injury.   2.  Transmetatarsal amputation of the first toe          COURSE & MEDICAL DECISION MAKING    This is a 47 y.o. male who presents with open wound after right great toe amputation yesterday at outside hospital.  Here for further wound care.    Differential Diagnosis includes but is not limited to:  Osteomyelitis, cellulitis, DKA, dehydration, open wound    ED Course:  9:07 PM - Patient seen and examined at bedside. Discussed plan of care, including plans of admission given he has an open wound with exposed bone and concern for osteomyelitis. Patient agrees to the plan of care. Ordered for DX-foot complete 3+ right, estimated GFR, Beta-hydroxybutyric acid, venous blood gas, urinalysis, blood culture, CBC with differential, CRP quantitive, CMP, lactic acid to evaluate his symptoms.  IV fluid bolus will be given with concerns for hyperglycemia, active infection, and  mild dehydration.    X-ray shows no obvious gas or traumatic injury.  Urinalysis with no evidence of infection but does have glucosuria and some ketones.  His pH is normal doubt DKA.  His blood sugar is slightly elevated.     On reevaluation the pain is having slightly increased pain, I will treat him with a dose of morphine.  I would like to keep him n.p.o. in case any further surgical management is necessary.  His heart rate is stable, and his mucous membranes are slightly more moist Achilles having a positive response to IV fluid rehydration.  The patient and wife understand that he must be admitted for further workup and treatment.  Given this is an open exposed bone I am going to treat him empirically with vancomycin and Zosyn.    11:03 PM I discussed the patient's case and the above findings with Dr. Puga (hospitalist) who kindly agrees to admit the patient.      The patient is hemodynamically stable for admission to the medical floor in guarded condition.      Medications   NS infusion ( Intravenous New Bag 7/1/18 0112)   cloNIDine (CATAPRES) tablet 0.1 mg (not administered)   ondansetron (ZOFRAN) syringe/vial injection 4 mg (not administered)   ondansetron (ZOFRAN ODT) dispertab 4 mg (not administered)   promethazine (PHENERGAN) tablet 12.5-25 mg (not administered)   promethazine (PHENERGAN) suppository 12.5-25 mg (not administered)   prochlorperazine (COMPAZINE) injection 5-10 mg (not administered)   senna-docusate (PERICOLACE or SENOKOT S) 8.6-50 MG per tablet 2 Tab (2 Tabs Oral Refused 7/1/18 3158)     And   polyethylene glycol/lytes (MIRALAX) PACKET 1 Packet (not administered)     And   magnesium hydroxide (MILK OF MAGNESIA) suspension 30 mL (not administered)     And   bisacodyl (DULCOLAX) suppository 10 mg (not administered)   acetaminophen (TYLENOL) tablet 650 mg (not administered)   insulin glargine (LANTUS) injection 10 Units (not administered)     And   insulin lispro (HUMALOG) injection 4 Units (4  Units Subcutaneous Given 7/1/18 1106)     And   insulin lispro (HUMALOG) injection 1-6 Units (1 Units Subcutaneous Given 7/1/18 1105)     And   glucose 4 g chewable tablet 16 g (not administered)     And   dextrose 50% (D50W) injection 25 mL (not administered)   MD ALERT... vancomycin per pharmacy protocol (not administered)   ampicillin/sulbactam (UNASYN) 3 g in  mL IVPB (0 g Intravenous Stopped 7/1/18 1131)   Pharmacy Consult Request ...Pain Management Review (not administered)     And   oxyCODONE immediate-release (ROXICODONE) tablet 2.5 mg (not administered)     And   oxyCODONE immediate-release (ROXICODONE) tablet 5 mg (5 mg Oral Given 7/1/18 1424)     And   morphine (pf) 4 mg/ml injection 2 mg (not administered)   vancomycin 1,600 mg in  mL IVPB (1,600 mg Intravenous New Bag 7/1/18 1314)   NS infusion 1,000 mL (0 mL Intravenous Stopped 6/30/18 2330)   morphine (pf) 10 mg/ml 10 MG/ML injection 6 mg (6 mg Intravenous Given 6/30/18 2339)   piperacillin-tazobactam (ZOSYN) 4.5 g in  mL IVPB (0 g Intravenous Stopped 7/1/18 0036)   vancomycin 2,400 mg in  mL IVPB (0 mg Intravenous Stopped 7/1/18 0522)     FINAL IMPRESSION  1. Open wound    2. Dehydration    3. Type 2 diabetes mellitus with other skin complication, without long-term current use of insulin ([facility])    4. Status post amputation of great toe, right (HCC)    5. Subacute osteomyelitis of right foot ([facility])      -ADMIT-    Pertinent Labs & Imaging studies reviewed and verified by myself, as well as nursing notes and the patient's past medical, family, and social histories (See chart for details).    Results, exam findings, clinical impression, presumed diagnosis, treatment options, and strict return precautions were discussed with the patient and family, and they verbalized understanding, agreed with, and appreciated the plan of care.    Varun MCCARTHY (Scribe), am scribing for, and in the presence of, Dick Cox,  M.D..    Electronically signed by: Varun Vera (Scribe), 6/30/2018    IDick M.D. personally performed the services described in this documentation, as scribed by Varun Vera in my presence, and it is both accurate and complete.    Portions of this record were made with voice recognition software.  Despite my review, spelling/grammar/context errors may still remain.  Interpretation of this chart should be taken in this context.    The note accurately reflects work and decisions made by me.  Dick Cox  7/1/2018  3:00 PM

## 2018-07-01 NOTE — ASSESSMENT & PLAN NOTE
Exposed bone, right metatarsal shaft stump, bleeding noted from the same.   7/2:  vanco, unasyn IV.  ID consultation appreciated, wound culture with MSSA, BCs negative.    7/6: 6 weeks (end 8/13) of ancef per ID, PICC today

## 2018-07-01 NOTE — CARE PLAN
Problem: Knowledge Deficit  Goal: Knowledge of disease process/condition, treatment plan, diagnostic tests, and medications will improve  POC discussed. Pt understands LPS has been consulted. All questions an concerns addressed.     Problem: Pain Management  Goal: Pain level will decrease to patient's comfort goal  Will manage pain with PRN pain meds.

## 2018-07-01 NOTE — ED NOTES
Dressing to (R) foot, changed and repacked with wet gauze, kerlix placed around guaze- great toe carpal bone, muscle and fat noted.  Pt tolerated well.  Updated on POC.  Denies other needs at this time.

## 2018-07-01 NOTE — ASSESSMENT & PLAN NOTE
On oral medication regimen as outpatient.    hgba1c 13.6%  Glucose controlled on lantus and glyburide

## 2018-07-01 NOTE — ED NOTES
Received report from Aylin HENAO. Pt care responsibilities assumed. Pt resting in bed,  will continue to monitor.

## 2018-07-01 NOTE — ED NOTES
Called report to Floor RN. Pt is aware of POC. Pt belongings are on bed. Pt is ready for transport.

## 2018-07-01 NOTE — ED NOTES
Med rec updated and complete.  Allergies reviewed.  Pt is currently taking an antibiotic ( Bactrim).

## 2018-07-01 NOTE — ED NOTES
Pt to purple 73, per pt , he had a right big toe amputation yesterday at [facility] in [place] due to infection to the bone.  Pt was advised to come here because his pcp is not available until next week and he needs to have wound vac and be on iv antibiotic.  Pt has DM type 2, he is currently taking Norco and Bactrim from discharge prescription.  Pt up for eval

## 2018-07-01 NOTE — CARE PLAN
Problem: Safety  Goal: Will remain free from injury  Outcome: PROGRESSING AS EXPECTED  Call light within reach, pt calls for assistance appropriately. Bed in low position and locked, upper bedside rails up, proper mobility signs placed, personal possessions within reach, treaded socks on. Hourly rounding in place.

## 2018-07-01 NOTE — ASSESSMENT & PLAN NOTE
Likely due to chronic disease without notable blood loss.  Transfuse if needed for hemoglobin less than 7 gm/dL

## 2018-07-01 NOTE — PROGRESS NOTES
LIMB PRESERVATION SERVICE NOTE:    Subjective:      Reason for Consultation: S/P Right Foot 1st MTH AMP from outside hospital 2 days ago    History of Present Illness:     Patient is a 47 y.o. male with a past medical history that includes type 2 diabetes and is admitted to [facility] for right foot osteomyelitis for which an LPS consult has been requested for. This wound started 3 weeks ago when the pt removing most of the skin off his right great toe by scraping it on his door jam. Over the next several days, he noted worsening pain, and the toe looked infected. He then was in [place] on vacation and went to the local hospital for evaluation, and through the course of evaluation with MRI, he apparently was found to have osteomyelitis, and underwent amputation of the Right Foot 1st digit at the [facility]. The pt then D/CdDue to a family issue on Bactrim therapy and a a wet-to-dry dressing and was told to admit to this emergency department and get a wound VAC.  Patient was diagnosed with diabetes approximately  10 years ago, and currently manages with oral meds.  Patient does not check blood sugars.          Lab Results   Component Value Date/Time    HBA1C 13.6 (H) 06/30/2018 09:20 PM      Pain:        Patient resting comfortably    Vitals  /91   Pulse 91   Temp 36.7 °C (98 °F)   Resp 14   Ht 1.829 m (6')   Wt 97.5 kg (215 lb)   SpO2 93%   BMI 29.16 kg/m²       Objective:        PHYSICAL EXAMINATION:     General Appearance:  Well developed,  nourished, in no acute distress    Sensory Assessment       Patient sensation insensate bilaterally with light touch on  2 of 10 points        Vascular Assessment       +1 DP, +2 PT bilaterally    Orthotic, protective, supportive devices:     Offloading  Pt provided with [employer] for orthotics    Wound Characteristics                                                    Location:   Initial Evaluation  Date:7/1/2018   Tissue Type and %: 40% Red 40% Yellow Slough 20% Black Eschar    Periwound: Red Erythema   Drainage: Scant Serosanginous   Exposed structures Bone   Wound Edges:   Open   Odor: None   S&S of Infection:   Edema/Erythema   Edema: Localized at Site   Sensation: insensate               Measurements: Initial Evaluation  Date:7/1/2018   Length (cm) 6.5   Width (cm) 4   Depth (cm) 2.5   Tract/undermine        Tests and Measures:    Labs  Recent Labs      06/30/18 2120   WBC  9.8   RBC  4.52*   HEMOGLOBIN  12.9*   HEMATOCRIT  38.2*   MCV  84.5   MCH  28.5   MCHC  33.8   RDW  36.3   PLATELETCT  308   MPV  9.4     Recent Labs      06/30/18 2120   SODIUM  138   POTASSIUM  3.8   CHLORIDE  102   CO2  23   GLUCOSE  133*   BUN  7*       A1C 13.6% Uncontrolled Diabetic  ESR: 82  CRP: 7.23    FLOWER    Pedal Pulses Present    Imaging    X-Ray:   DX-FOOT-COMPLETE 3+ RIGHT   Final Result         1.  No acute traumatic bony injury.   2.  Transmetatarsal amputation of the first toe          Infection Management  Microbiology pend      Procedures:        Debridement :       Cleansed with: NS                                                                            Periwound protected with: skin prep     Primary dressing: xeroform     Secondary Dressing: roll guaze     Other:       NURSING TO CHANGE RIGHT FOOT  SURGICAL SITE DRESSING EVERY OTHER DAY AND PRN FOR SATURATION OR DISLODGEMENT  Nursing to cleanse wound/periwound with Normal Saline (NS).  Pat periwound dry.  Apply skin prep/No Sting to periwound.  Let air dry for 1-2 minutes. Apply XEROFORM, cut to size, to suture sites. Cover with Non Adhesive Foam and secure with Roll Gauze.  Then apply elastic bandage to secure dressings in place.   Please take Weekly Wound Photos. Notify wound team if wound deteriorates or fails to progress.    Patient Education: Implications of loss of protective sensation (LOPS) discussed with patient- including increased risk for amputation.  Advised to check foot at least daily, moisturize foot, and to always wear  protective foot wear.    Plan:       Treatment Plan and Recommendations:    1. Labs\Imaging: Reviewed    2. Treatment:   Pt NPO at midnight to be evaluated by Dr douglass in AM for possible Amp site revision vs VAC replacement.     Wound Care by Nursing, LPS to Follow.                           Dressing Orders Updated. Skin Care Updated.      Nursing to change every 48 hours and PRN for Saturation or Dislodgement     Antibiotics per ID     HWB when OOB wearing Shoe  3.Collaboration:      Diabetes Educator Involved: A1C is 13.6% Pt educated on keeping BS less than 150 to control infection and promote wound healing       4. Orthotics/Prosthetics:      pt to get orthotics/prosthetics  as OP ability involved, pt to wear shoes that do not rub on Wound sites       Anticipated discharge plans (X):   SNF:            Home Care:            Outpatient Wound Center: X       Self Care:             Other:            TBD: X    Professional Collaboration: D/W:

## 2018-07-01 NOTE — PROGRESS NOTES
Pt arrived to floor via gurney with pt transport.  Pt slid self from gurney to bed. Pt A&O x4.      Two RN skin check done with JULIANO Becerra.  Pt has open wound with redness to R foot, pt has scattered scabbing to bilateral lower extremities.  No other skin abnormalities noted.

## 2018-07-01 NOTE — PROGRESS NOTES
[facility] Hospitalist Progress Note    Date of Service: 7/1/2018    Chief Complaint  47 y.o. male admitted 6/30/2018 with history of diabetes mellitus on oral medication regimen was in his usual state of health until approximately 2-1/2 weeks prior to admission. While walking in his home, he injured his right great toe on the door threshold, removing the skin almost entirely. Over the next several days, he noted worsening pain, the toe looked infected. He did have a vacation out of town near [place], during which time he did go swimming in the leg, however throughout this time the infection seemed to get worse. He subsequently presented to Hospital in [place], and through the course of evaluation with MRI, he apparently was found to have osteomyelitis, and underwent transmetatarsal amputation of the 1st digit on 6/29/18. Patient states his father threatened to take the patient AMA in order to get back to [place], he was then discharged from the hospital. He was given a course of Bactrim therapy and told to find a way to get a wound VAC. A wet-to-dry dressing was placed, and he transported himself to [facility] emergency department. He has no fever or chills, he is able to walk on the remaining toes with offloading shoe provided to patient.      Interval Problem Update  7/1:  Viewed on media tab with large, extensive amount of exposed tissue, tendon, bone, fatty tissue seen, erythema at wound edges extending into forefoot. Made NPO at MN since will need surgical debridement of extensive wound wound.  Coags ordered, on vanco and unasyn, ivfs 83/hr.  XRay negative for OM, elevated CRP 7.23, added sed rate, BCs x2.    Consultants/Specialty  LPS   ID  surgeon    Disposition  PT/OT post op.        Review of Systems   Constitutional: Negative for chills, diaphoresis, fever and malaise/fatigue.   HENT: Negative for congestion and sore throat.    Eyes: Negative for pain and discharge.   Respiratory: Negative for cough, hemoptysis,  sputum production, shortness of breath and wheezing.    Cardiovascular: Negative for chest pain, palpitations, claudication and leg swelling.   Gastrointestinal: Negative for abdominal pain, constipation, diarrhea, melena, nausea and vomiting.   Genitourinary: Negative for dysuria, frequency and urgency.   Musculoskeletal: Negative for back pain, joint pain, myalgias and neck pain.   Skin: Negative for itching and rash.        Open wound rt great toe s/p amputation w/o closure.   Neurological: Negative for dizziness, sensory change, speech change, focal weakness, loss of consciousness, weakness and headaches.   Endo/Heme/Allergies: Does not bruise/bleed easily.   Psychiatric/Behavioral: Negative for depression, substance abuse and suicidal ideas.      Physical Exam  Laboratory/Imaging   Hemodynamics  Temp (24hrs), Av.9 °C (98.4 °F), Min:36.7 °C (98 °F), Max:37.2 °C (98.9 °F)   Temperature: 36.7 °C (98 °F)  Pulse  Av.5  Min: 86  Max: 100    Blood Pressure: 159/91, NIBP: 156/83      Respiratory      Respiration: 14, Pulse Oximetry: 93 %             Fluids    Intake/Output Summary (Last 24 hours) at 18 1950  Last data filed at 18 1700   Gross per 24 hour   Intake             2138 ml   Output                0 ml   Net             2138 ml       Nutrition  Orders Placed This Encounter   Procedures   • Diet Order Consistent Carbohydrate, Diabetic     Standing Status:   Standing     Number of Occurrences:   1     Order Specific Question:   Diet:     Answer:   Consistent Carbohydrate [4]     Order Specific Question:   Diet:     Answer:   Diabetic [3]   • DIET NPO     Standing Status:   Standing     Number of Occurrences:   8     Order Specific Question:   Restrict to:     Answer:   Sips with Medications [3]     Physical Exam   Constitutional: He is oriented to person, place, and time. He appears well-developed and well-nourished. No distress.   HENT:   Head: Normocephalic and atraumatic.   Mouth/Throat:  Oropharynx is clear and moist. No oropharyngeal exudate.   Eyes: Conjunctivae and EOM are normal. Pupils are equal, round, and reactive to light. Right eye exhibits no discharge. Left eye exhibits no discharge. No scleral icterus.   Neck: Normal range of motion. Neck supple. No JVD present. No tracheal deviation present. No thyromegaly present.   Cardiovascular: Normal rate, regular rhythm and normal heart sounds.  Exam reveals no gallop and no friction rub.    No murmur heard.  Pulmonary/Chest: Effort normal and breath sounds normal. No respiratory distress. He has no wheezes. He has no rales. He exhibits no tenderness.   Abdominal: Soft. Bowel sounds are normal. He exhibits no distension and no mass. There is no tenderness. There is no rebound and no guarding.   Musculoskeletal: Normal range of motion. He exhibits no edema or tenderness.   Right great toe reviewed media tab image since wound care had just been bandaged.  Large, extensive involvement seen into the forefoot with exposed tissue, bone, fat, tendons, surrounding erythema of surgical wound edge.  Will need OR debridement.   Lymphadenopathy:     He has no cervical adenopathy.   Neurological: He is alert and oriented to person, place, and time. No cranial nerve deficit. He exhibits normal muscle tone.   Skin: Skin is warm and dry. No rash noted. He is not diaphoretic. No erythema.   Psychiatric: He has a normal mood and affect. His behavior is normal. Judgment and thought content normal.   Nursing note and vitals reviewed.      Recent Labs      06/30/18 2120   WBC  9.8   RBC  4.52*   HEMOGLOBIN  12.9*   HEMATOCRIT  38.2*   MCV  84.5   MCH  28.5   MCHC  33.8   RDW  36.3   PLATELETCT  308   MPV  9.4     Recent Labs      06/30/18 2120   SODIUM  138   POTASSIUM  3.8   CHLORIDE  102   CO2  23   GLUCOSE  133*   BUN  7*   CREATININE  0.89   CALCIUM  8.4*                      Assessment/Plan     * Subacute osteomyelitis of right foot (HCC)   Assessment & Plan     Exposed bone, right metatarsal shaft stump, bleeding noted from the same.  Will start intravenous antibiotics.  Will need ID consultation for further tailoring of antibiotics, possible home care.         Status post amputation of great toe, right ([facility])   Assessment & Plan    Transmetatarsal amputation of right great toe, open wound, bleeding noted from metatarsal stump.  Patient was apparently told to get a wound vac placed after being discharged. limb preservation service consult appreciated.  NPO at [facility] for definite need for OR debridement in a.m.  coags ordered.  Hold dvt prophylaxis in a.m.  Xray neg for OM, but elevated CRP 7.23, sed rate and BCs x 2 ordered .        Overweight (BMI 25.0-29.9)   Assessment & Plan    Body mass index is 29.16 kg/m².          Diabetes mellitus with coincident hypertension ([facility])   Assessment & Plan    Not currently medicated for the same.  Monitor.  As needed medications.   low dose ACEI for asif-protective effects in DM.          Type 2 diabetes mellitus with hyperglycemia, without long-term current use of insulin ([facility])   Assessment & Plan    On oral medication regimen as outpatient.  Check hgba1c, insulin basal and prandial dosing while hospitalized will be started.         Normocytic anemia   Assessment & Plan    Likely due to chronic disease without notable blood loss.  Transfuse if needed for hemoglobin less than 7 gm/dL            Quality-Core Measures

## 2018-07-01 NOTE — PROGRESS NOTES
Pt is AAO x4.  Pt reports a 5/10 R foot pain level.  Medicated per MAR.  VS WNL.  R foot dressing in place, CDI.  L PIV patent, running fluids.  POC discussed.  All needs met at this time.  Bed in low position.  Call light within reach.  Rounding in place.

## 2018-07-01 NOTE — H&P
Hospital Medicine History & Physical Note    Date of Service  6/30/2018    Primary Care Physician  Nolan Buchanan D.O.    Consultants  Limb preservation service     Code Status  Full code     Chief Complaint  Wound in right foot     History of Presenting Illness  47 y.o. male with history of diabetes mellitus on oral medication regimen was in his usual state of health until approximately 2-1/2 weeks prior to admission. While walking in his home, he injured his right great toe on the door threshold, removing the skin almost entirely. Over the next several days, he noted worsening pain, the toe looked infected. He did have a vacation out of town near [place], during which time he did go swimming in the leg, however throughout this time the infection seemed to get worse. He subsequently presented to Hospital in [place], and through the course of evaluation with MRI, he apparently was found to have osteomyelitis, and underwent transmetatarsal amputation of the 1st digit. Due to a family issue, he was then advised to be discharged from the hospital and this did occur. He was given a course of Bactrim therapy and told to find a way to get a wound VAC. A wet-to-dry dressing was placed, and he transported himself to this emergency department. He has no fever or chills, he has no chest pain or shortness of breath, he is able to walk on the remaining toes, he does have pain at the surgical site, with no exacerbating or alleviating factors.     Review of Systems  Review of Systems   Constitutional: Negative.    HENT: Negative.    Eyes: Negative.    Respiratory: Negative.    Cardiovascular: Negative.    Gastrointestinal: Negative.    Genitourinary: Negative.    Musculoskeletal: Positive for joint pain.   Skin: Negative.    Neurological: Negative.    Endo/Heme/Allergies: Negative.    Psychiatric/Behavioral: Negative.        Past Medical History   has a past medical history of Diabetes ([facility]).    Surgical History   has a past  surgical history that includes other abdominal surgery; other orthopedic surgery; and other.     Family History  family history includes Diabetes in his father; Heart Disease in his father; No Known Problems in his mother.     Social History   reports that he has never smoked. He has never used smokeless tobacco. He reports that he does not drink alcohol or use drugs.    Allergies  No Known Allergies    Medications  Prior to Admission Medications   Prescriptions Last Dose Informant Patient Reported? Taking?   HYDROcodone-acetaminophen (NORCO) 5-325 MG Tab per tablet 6/30/2018 at 1500 Patient Yes Yes   Sig: Take 1-2 Tabs by mouth every four hours as needed.   glyBURIDE (DIABETA) 5 MG Tab 6/30/2018 at 0900 Patient Yes Yes   Sig: Take 5 mg by mouth every morning with breakfast.   omeprazole (PRILOSEC) 20 MG delayed-release capsule 6/30/2018 at 0900 Patient Yes Yes   Sig: Take 20 mg by mouth every day.   sulfamethoxazole-trimethoprim (BACTRIM DS) 800-160 MG tablet 6/30/2018 at 0900 Patient Yes Yes   Sig: Take 1 Tab by mouth 2 times a day.      Facility-Administered Medications: None       Physical Exam  Blood Pressure: 160/93   Temperature: 37.2 °C (98.9 °F)   Pulse: 96   Respiration: 16   Pulse Oximetry: 93 %     Physical Exam   Constitutional: He is oriented to person, place, and time. He appears well-developed and well-nourished. No distress.   HENT:   Head: Normocephalic and atraumatic.   Eyes: Conjunctivae are normal. Pupils are equal, round, and reactive to light.   Neck: Normal range of motion. Neck supple. No tracheal deviation present. No thyromegaly present.   Cardiovascular: Normal rate, regular rhythm and normal heart sounds.  Exam reveals no gallop and no friction rub.    No murmur heard.  Pulmonary/Chest: Effort normal and breath sounds normal. No respiratory distress. He has no wheezes.   Abdominal: Soft. Bowel sounds are normal. He exhibits no distension and no mass. There is no tenderness. There is no  rebound and no guarding.   Musculoskeletal: Normal range of motion. He exhibits tenderness and deformity. He exhibits no edema.   Open hole where there was the first digit on the right foot.  There is visible bone with bleeding, surrounding musculature appears intact, as patient is able to freely move the remaining four toes   Lymphadenopathy:     He has no cervical adenopathy.   Neurological: He is alert and oriented to person, place, and time. No cranial nerve deficit.   Skin: Skin is warm and dry. He is not diaphoretic.   Psychiatric: He has a normal mood and affect.   Nursing note and vitals reviewed.      Laboratory:  Recent Labs      06/30/18 2120   WBC  9.8   RBC  4.52*   HEMOGLOBIN  12.9*   HEMATOCRIT  38.2*   MCV  84.5   MCH  28.5   MCHC  33.8   RDW  36.3   PLATELETCT  308   MPV  9.4     Recent Labs      06/30/18 2120   SODIUM  138   POTASSIUM  3.8   CHLORIDE  102   CO2  23   GLUCOSE  133*   BUN  7*   CREATININE  0.89   CALCIUM  8.4*     Recent Labs      06/30/18 2120   ALTSGPT  8   ASTSGOT  12   ALKPHOSPHAT  39   TBILIRUBIN  0.7   GLUCOSE  133*                 No results found for: TROPONINI    Urinalysis:    No results found for: SPECGRAVITY, GLUCOSEUR, KETONES, NITRITE, WBCURINE, RBCURINE, BACTERIA, EPITHELCELL     Imaging:  DX-FOOT-COMPLETE 3+ RIGHT   Final Result         1.  No acute traumatic bony injury.   2.  Transmetatarsal amputation of the first toe            Assessment/Plan:  I anticipate this patient will require at least two midnights for appropriate medical management, necessitating inpatient admission.    * Subacute osteomyelitis of right foot (HCC)   Assessment & Plan    Exposed bone, right metatarsal shaft stump, bleeding noted from the same.  Will start intravenous antibiotics.  Will need ID consultation for further tailoring of antibiotics, possible home care.         Status post amputation of great toe, right (HCC)   Assessment & Plan    Transmetatarsal amputation of right great  toe, open wound, bleeding noted from metatarsal stump.  Patient was apparently told to get a wound vac placed after being discharged.  Will consult limb preservation service for further wound care.  May need additional surgical intervention.          Overweight (BMI 25.0-29.9)   Assessment & Plan    Body mass index is 29.16 kg/m².          Diabetes mellitus with coincident hypertension (HCC)   Assessment & Plan    Not currently medicated for the same.  Monitor.  As needed medications.  Will start low dose ACEI for asif-protective effects in DM.          Type 2 diabetes mellitus with hyperglycemia, without long-term current use of insulin (HCC)   Assessment & Plan    On oral medication regimen as outpatient.  Check hgba1c, insulin basal and prandial dosing while hospitalized will be started.         Normocytic anemia   Assessment & Plan    Likely due to chronic disease without notable blood loss.  Transfuse if needed for hemoglobin less than 7 gm/dL              VTE prophylaxis: SCD

## 2018-07-01 NOTE — ASSESSMENT & PLAN NOTE
On admission started low dose ACEI for asif-protective effects in DM.    7/2:  Added metoprolol 25mg po bid, hydralazine 25mg po q 4 hours prn Bp >160/90.    Increasing lisinopril

## 2018-07-01 NOTE — PROGRESS NOTES
Pharmacy Kinetics 47 y.o. male on vancomycin day # 1 2018    Vancomycin New Start    2400 mg iv loading dose administered  @ 0222      Indication for Treatment:   SSTI    Pertinent history per medical record:   Admitted on 2018 for evaluation of open wound. Pt sustained trauma to the right great toe ~2.5 weeks PTA, resulting in loss of a large area of skin. He then apparently travelled to [place], where he reports swimming with injury. The wound worsened enough that patient presented to a [place] hospital where he was diagnosed with osteomyelitis and underwent transmetatarsal amputation. He was discharged with a course of Septra. He subsequently presented to [facility] for further evaluation. He denies fever or chills. Physical exam significant for gaping open wound with visible bone, tendons, and muscles. Some minor bleeding when pt moves toes. LPS and ID are to consult.     Other antibiotics:   Unasyn    Allergies:   Patient has no known allergies.     List concerns for renal function:   Hx DM     Pertinent cultures to date:   Blood cultures in process     Recent Labs      18   WBC  9.8   NEUTSPOLYS  63.60     Recent Labs      18   BUN  7*   CREATININE  0.89   ALBUMIN  2.7*       Blood pressure 160/93, pulse 91, temperature 37.2 °C (98.9 °F), resp. rate 16, height 1.829 m (6'), weight 97.5 kg (215 lb), SpO2 93 %. Temp (24hrs), Av.2 °C (98.9 °F), Min:37.2 °C (98.9 °F), Max:37.2 °C (98.9 °F)      A/P   1. Vancomycin dose change: start 1700 mg IV q12h (1300, 0100)  2. Next vancomycin level: 7/3 @ 0030 (not yet ordered)   3. Goal trough: 12-16  4. Comments: Limited risk for drug accumulation or associated nephrotoxicity. See wound photos. ID is to consult as well as LPS. No sx of systemic toxicity at this time; vitals are stable and pt is on room air. Will start ~16 mg/kg q12h and obtain trough prior to the fifth total dose to ensure sufficient clearance and therapeutic trough levels.  Pharmacy will continue to monitor and adjust dosing or recommend de-escalation as appropriate.       Madyson Briceño, PharmD

## 2018-07-01 NOTE — ED NOTES
Pt wheeled to room by ED tech chart up for erp to see.  notified about pt's need to get a wound vac until pcp can set up home health for the pt.

## 2018-07-02 LAB
ANION GAP SERPL CALC-SCNC: 9 MMOL/L (ref 0–11.9)
APTT PPP: 31.3 SEC (ref 24.7–36)
BASOPHILS # BLD AUTO: 0.7 % (ref 0–1.8)
BASOPHILS # BLD: 0.06 K/UL (ref 0–0.12)
BUN SERPL-MCNC: 6 MG/DL (ref 8–22)
CALCIUM SERPL-MCNC: 8.5 MG/DL (ref 8.5–10.5)
CHLORIDE SERPL-SCNC: 105 MMOL/L (ref 96–112)
CO2 SERPL-SCNC: 25 MMOL/L (ref 20–33)
CREAT SERPL-MCNC: 0.85 MG/DL (ref 0.5–1.4)
EOSINOPHIL # BLD AUTO: 0.34 K/UL (ref 0–0.51)
EOSINOPHIL NFR BLD: 3.9 % (ref 0–6.9)
ERYTHROCYTE [DISTWIDTH] IN BLOOD BY AUTOMATED COUNT: 36.5 FL (ref 35.9–50)
GLUCOSE BLD-MCNC: 120 MG/DL (ref 65–99)
GLUCOSE BLD-MCNC: 162 MG/DL (ref 65–99)
GLUCOSE BLD-MCNC: 164 MG/DL (ref 65–99)
GLUCOSE BLD-MCNC: 184 MG/DL (ref 65–99)
GLUCOSE SERPL-MCNC: 192 MG/DL (ref 65–99)
GRAM STN SPEC: NORMAL
GRAM STN SPEC: NORMAL
HCT VFR BLD AUTO: 38.3 % (ref 42–52)
HGB BLD-MCNC: 13.2 G/DL (ref 14–18)
IMM GRANULOCYTES # BLD AUTO: 0.08 K/UL (ref 0–0.11)
IMM GRANULOCYTES NFR BLD AUTO: 0.9 % (ref 0–0.9)
INR PPP: 1.14 (ref 0.87–1.13)
LYMPHOCYTES # BLD AUTO: 2.61 K/UL (ref 1–4.8)
LYMPHOCYTES NFR BLD: 29.9 % (ref 22–41)
MCH RBC QN AUTO: 29.3 PG (ref 27–33)
MCHC RBC AUTO-ENTMCNC: 34.5 G/DL (ref 33.7–35.3)
MCV RBC AUTO: 84.9 FL (ref 81.4–97.8)
MONOCYTES # BLD AUTO: 0.79 K/UL (ref 0–0.85)
MONOCYTES NFR BLD AUTO: 9 % (ref 0–13.4)
NEUTROPHILS # BLD AUTO: 4.85 K/UL (ref 1.82–7.42)
NEUTROPHILS NFR BLD: 55.6 % (ref 44–72)
NRBC # BLD AUTO: 0 K/UL
NRBC BLD-RTO: 0 /100 WBC
PLATELET # BLD AUTO: 335 K/UL (ref 164–446)
PMV BLD AUTO: 9.5 FL (ref 9–12.9)
POTASSIUM SERPL-SCNC: 3.6 MMOL/L (ref 3.6–5.5)
PROTHROMBIN TIME: 14.3 SEC (ref 12–14.6)
RBC # BLD AUTO: 4.51 M/UL (ref 4.7–6.1)
SIGNIFICANT IND 70042: NORMAL
SIGNIFICANT IND 70042: NORMAL
SITE SITE: NORMAL
SITE SITE: NORMAL
SODIUM SERPL-SCNC: 139 MMOL/L (ref 135–145)
SOURCE SOURCE: NORMAL
SOURCE SOURCE: NORMAL
WBC # BLD AUTO: 8.7 K/UL (ref 4.8–10.8)

## 2018-07-02 PROCEDURE — A9270 NON-COVERED ITEM OR SERVICE: HCPCS | Performed by: HOSPITALIST

## 2018-07-02 PROCEDURE — 302128 INFUSION PUMP: Performed by: HOSPITALIST

## 2018-07-02 PROCEDURE — 500881 HCHG PACK, EXTREMITY: Performed by: ORTHOPAEDIC SURGERY

## 2018-07-02 PROCEDURE — 80048 BASIC METABOLIC PNL TOTAL CA: CPT

## 2018-07-02 PROCEDURE — 0Y6M0ZF DETACHMENT AT RIGHT FOOT, PARTIAL 5TH RAY, OPEN APPROACH: ICD-10-PCS | Performed by: ORTHOPAEDIC SURGERY

## 2018-07-02 PROCEDURE — 501838 HCHG SUTURE GENERAL: Performed by: ORTHOPAEDIC SURGERY

## 2018-07-02 PROCEDURE — 700101 HCHG RX REV CODE 250

## 2018-07-02 PROCEDURE — 700111 HCHG RX REV CODE 636 W/ 250 OVERRIDE (IP)

## 2018-07-02 PROCEDURE — 82962 GLUCOSE BLOOD TEST: CPT

## 2018-07-02 PROCEDURE — 99233 SBSQ HOSP IP/OBS HIGH 50: CPT | Performed by: HOSPITALIST

## 2018-07-02 PROCEDURE — 160039 HCHG SURGERY MINUTES - EA ADDL 1 MIN LEVEL 3: Performed by: ORTHOPAEDIC SURGERY

## 2018-07-02 PROCEDURE — 87075 CULTR BACTERIA EXCEPT BLOOD: CPT

## 2018-07-02 PROCEDURE — 770006 HCHG ROOM/CARE - MED/SURG/GYN SEMI*

## 2018-07-02 PROCEDURE — 160009 HCHG ANES TIME/MIN: Performed by: ORTHOPAEDIC SURGERY

## 2018-07-02 PROCEDURE — 0Y6M0ZD DETACHMENT AT RIGHT FOOT, PARTIAL 4TH RAY, OPEN APPROACH: ICD-10-PCS | Performed by: ORTHOPAEDIC SURGERY

## 2018-07-02 PROCEDURE — 160035 HCHG PACU - 1ST 60 MINS PHASE I: Performed by: ORTHOPAEDIC SURGERY

## 2018-07-02 PROCEDURE — 160022 HCHG BLOCK: Performed by: ORTHOPAEDIC SURGERY

## 2018-07-02 PROCEDURE — 0LSN0ZZ REPOSITION RIGHT LOWER LEG TENDON, OPEN APPROACH: ICD-10-PCS | Performed by: ORTHOPAEDIC SURGERY

## 2018-07-02 PROCEDURE — 87015 SPECIMEN INFECT AGNT CONCNTJ: CPT

## 2018-07-02 PROCEDURE — 700102 HCHG RX REV CODE 250 W/ 637 OVERRIDE(OP): Performed by: HOSPITALIST

## 2018-07-02 PROCEDURE — 85610 PROTHROMBIN TIME: CPT

## 2018-07-02 PROCEDURE — 500423 HCHG DRESSING, ABD COMBINE: Performed by: ORTHOPAEDIC SURGERY

## 2018-07-02 PROCEDURE — 160002 HCHG RECOVERY MINUTES (STAT): Performed by: ORTHOPAEDIC SURGERY

## 2018-07-02 PROCEDURE — 0QBN0ZZ EXCISION OF RIGHT METATARSAL, OPEN APPROACH: ICD-10-PCS | Performed by: ORTHOPAEDIC SURGERY

## 2018-07-02 PROCEDURE — 99232 SBSQ HOSP IP/OBS MODERATE 35: CPT | Performed by: INTERNAL MEDICINE

## 2018-07-02 PROCEDURE — 85730 THROMBOPLASTIN TIME PARTIAL: CPT

## 2018-07-02 PROCEDURE — 87070 CULTURE OTHR SPECIMN AEROBIC: CPT

## 2018-07-02 PROCEDURE — 87205 SMEAR GRAM STAIN: CPT

## 2018-07-02 PROCEDURE — 160048 HCHG OR STATISTICAL LEVEL 1-5: Performed by: ORTHOPAEDIC SURGERY

## 2018-07-02 PROCEDURE — 36415 COLL VENOUS BLD VENIPUNCTURE: CPT

## 2018-07-02 PROCEDURE — 160028 HCHG SURGERY MINUTES - 1ST 30 MINS LEVEL 3: Performed by: ORTHOPAEDIC SURGERY

## 2018-07-02 PROCEDURE — 85025 COMPLETE CBC W/AUTO DIFF WBC: CPT

## 2018-07-02 PROCEDURE — 0Y6M0ZB DETACHMENT AT RIGHT FOOT, PARTIAL 2ND RAY, OPEN APPROACH: ICD-10-PCS | Performed by: ORTHOPAEDIC SURGERY

## 2018-07-02 PROCEDURE — 700111 HCHG RX REV CODE 636 W/ 250 OVERRIDE (IP): Performed by: HOSPITALIST

## 2018-07-02 PROCEDURE — 0Y6M0ZC DETACHMENT AT RIGHT FOOT, PARTIAL 3RD RAY, OPEN APPROACH: ICD-10-PCS | Performed by: ORTHOPAEDIC SURGERY

## 2018-07-02 PROCEDURE — A6223 GAUZE >16<=48 NO W/SAL W/O B: HCPCS | Performed by: ORTHOPAEDIC SURGERY

## 2018-07-02 PROCEDURE — 700105 HCHG RX REV CODE 258: Performed by: HOSPITALIST

## 2018-07-02 RX ORDER — VANCOMYCIN HYDROCHLORIDE 500 MG/10ML
INJECTION, POWDER, LYOPHILIZED, FOR SOLUTION INTRAVENOUS
Status: COMPLETED | OUTPATIENT
Start: 2018-07-02 | End: 2018-07-02

## 2018-07-02 RX ORDER — HYDRALAZINE HYDROCHLORIDE 50 MG/1
25 TABLET, FILM COATED ORAL EVERY 4 HOURS PRN
Status: DISCONTINUED | OUTPATIENT
Start: 2018-07-02 | End: 2018-07-06 | Stop reason: HOSPADM

## 2018-07-02 RX ADMIN — OXYCODONE HYDROCHLORIDE 5 MG: 5 TABLET ORAL at 03:19

## 2018-07-02 RX ADMIN — INSULIN LISPRO 1 UNITS: 100 INJECTION, SOLUTION INTRAVENOUS; SUBCUTANEOUS at 16:50

## 2018-07-02 RX ADMIN — INSULIN LISPRO 1 UNITS: 100 INJECTION, SOLUTION INTRAVENOUS; SUBCUTANEOUS at 07:25

## 2018-07-02 RX ADMIN — VANCOMYCIN HYDROCHLORIDE 1600 MG: 100 INJECTION, POWDER, LYOPHILIZED, FOR SOLUTION INTRAVENOUS at 16:19

## 2018-07-02 RX ADMIN — AMPICILLIN SODIUM AND SULBACTAM SODIUM 3 G: 2; 1 INJECTION, POWDER, FOR SOLUTION INTRAMUSCULAR; INTRAVENOUS at 05:29

## 2018-07-02 RX ADMIN — HYDRALAZINE HYDROCHLORIDE 25 MG: 50 TABLET, FILM COATED ORAL at 08:52

## 2018-07-02 RX ADMIN — SODIUM CHLORIDE: 9 INJECTION, SOLUTION INTRAVENOUS at 20:28

## 2018-07-02 RX ADMIN — OXYCODONE HYDROCHLORIDE 5 MG: 5 TABLET ORAL at 16:56

## 2018-07-02 RX ADMIN — AMPICILLIN SODIUM AND SULBACTAM SODIUM 3 G: 2; 1 INJECTION, POWDER, FOR SOLUTION INTRAMUSCULAR; INTRAVENOUS at 11:49

## 2018-07-02 RX ADMIN — METOPROLOL TARTRATE 25 MG: 25 TABLET, FILM COATED ORAL at 20:04

## 2018-07-02 RX ADMIN — INSULIN LISPRO 4 UNITS: 100 INJECTION, SOLUTION INTRAVENOUS; SUBCUTANEOUS at 16:53

## 2018-07-02 RX ADMIN — OXYCODONE HYDROCHLORIDE 5 MG: 5 TABLET ORAL at 20:04

## 2018-07-02 RX ADMIN — INSULIN LISPRO 1 UNITS: 100 INJECTION, SOLUTION INTRAVENOUS; SUBCUTANEOUS at 20:04

## 2018-07-02 RX ADMIN — AMPICILLIN SODIUM AND SULBACTAM SODIUM 3 G: 2; 1 INJECTION, POWDER, FOR SOLUTION INTRAMUSCULAR; INTRAVENOUS at 18:49

## 2018-07-02 RX ADMIN — OXYCODONE HYDROCHLORIDE 5 MG: 5 TABLET ORAL at 08:52

## 2018-07-02 RX ADMIN — INSULIN LISPRO 4 UNITS: 100 INJECTION, SOLUTION INTRAVENOUS; SUBCUTANEOUS at 07:24

## 2018-07-02 RX ADMIN — VANCOMYCIN HYDROCHLORIDE 1600 MG: 100 INJECTION, POWDER, LYOPHILIZED, FOR SOLUTION INTRAVENOUS at 03:14

## 2018-07-02 RX ADMIN — AMPICILLIN SODIUM AND SULBACTAM SODIUM 3 G: 2; 1 INJECTION, POWDER, FOR SOLUTION INTRAMUSCULAR; INTRAVENOUS at 23:10

## 2018-07-02 RX ADMIN — INSULIN GLARGINE 10 UNITS: 100 INJECTION, SOLUTION SUBCUTANEOUS at 20:03

## 2018-07-02 RX ADMIN — METOPROLOL TARTRATE 25 MG: 25 TABLET, FILM COATED ORAL at 08:53

## 2018-07-02 ASSESSMENT — ENCOUNTER SYMPTOMS
WEAKNESS: 0
DIAPHORESIS: 0
NAUSEA: 0
CHILLS: 0
PALPITATIONS: 0
CONSTIPATION: 0
EYE PAIN: 0
VOMITING: 0
HEADACHES: 0
FOCAL WEAKNESS: 0
SENSORY CHANGE: 0
DIZZINESS: 0
HEMOPTYSIS: 0
SHORTNESS OF BREATH: 0
SPUTUM PRODUCTION: 0
FEVER: 0
NECK PAIN: 0
COUGH: 0
BACK PAIN: 0
CLAUDICATION: 0
WHEEZING: 0
EYE DISCHARGE: 0
MYALGIAS: 0
BRUISES/BLEEDS EASILY: 0
LOSS OF CONSCIOUSNESS: 0
ABDOMINAL PAIN: 0
SPEECH CHANGE: 0
SORE THROAT: 0
DEPRESSION: 0
MYALGIAS: 1
DIARRHEA: 0

## 2018-07-02 ASSESSMENT — PAIN SCALES - GENERAL
PAINLEVEL_OUTOF10: 0
PAINLEVEL_OUTOF10: 6
PAINLEVEL_OUTOF10: 0
PAINLEVEL_OUTOF10: 4
PAINLEVEL_OUTOF10: 0
PAINLEVEL_OUTOF10: 0
PAINLEVEL_OUTOF10: 4

## 2018-07-02 ASSESSMENT — LIFESTYLE VARIABLES: SUBSTANCE_ABUSE: 0

## 2018-07-02 NOTE — OP REPORT
DATE OF SERVICE:  06/30/2018    PREOPERATIVE DIAGNOSES:  1.  Diabetes.  2.  Open first ray amputation.  3.  Neuropathy.    POSTOPERATIVE DIAGNOSES:  1.  Diabetes.  2.  Open first ray amputation.  3.  Neuropathy.    PROCEDURES:  1.  Right gastrocsoleus recession  2.  Right transmetatarsal amputation.  3.  Right foot irrigation and debridement, multiple compartments.    SURGEON:  Ravi Bernardo MD    FIRST ASSISTANT:  Libby Richardson MD    SECOND ASSISTANT:  Alondra Rodriguez.    ANESTHESIA:  General endotracheal with popliteal block per my request for   postoperative pain management.    ESTIMATED BLOOD LOSS:  None.    COMPLICATIONS:  None.    POSTOPERATIVE PLAN:  1.  Nonweightbearing.  2.  Antibiotics per infectious disease.  3.  No further wound care needed at this time.    INDICATIONS:  Please see H and P.    PROCEDURE IN DETAIL:  The patient was brought into the operating room and   underwent general endotracheal anesthesia without complications.  His right   lower extremity was prepped and draped in standard fashion.  Positive site   verification confirmed his right lower extremity as well as procedure and   confirmation that he received preoperative antibiotics.  Esmarch was used to   exsanguinate his foot and ankle and leg tourniquet was inflated to 250 mmHg.    A posteromedial incision was made at the level of musculotendinous junction of   the gastroc.  It was brought through the crural fascia exposing the gastroc   tendon.  Under direct visualization from lateral to medial, the gastroc tendon   was released preserving the underlying soleus fascia.  Once this was   completed, there was much improvement in his dorsiflexion of his foot.  The   wound was irrigated with copious irrigation, was closed in layered fashion   using 3-0 Vicryl and 3-0 nylon.    Using a #15 blade, sharp full thickness dissection was made at the base of the   toes just over the metatarsal heads, dorsal and plantar.  This was brought    down to the level of the metatarsals, which were then elevated up to the level   of bone to keep full thickness flaps.  Microsagittal saw was used to remove   all the 2nd through 5th metatarsals with a plantar bevel.  The areas were   smoothed.  Next, a #15 blade was then used to sharply excise the whole area   around his recent and previous first ray amputation.  There is necrotic tissue   around the base as well as some gangrenous tissue and this was all sharply   cut back to and down to good quality bone.  The tissue was necrotic.  The   measurement of the wound was approximately 4x8 cm.  A segment of the first   metatarsal base was sent off as a margin.  A thorough irrigation was   performed.  Tourniquet was released, hemostasis was obtained.  Using a   rotational type flap coverage to the plantar and dorsal surfaces were   carefully trimmed and rotated to allow for complete closure of the wound with   relatively little tension.  The wound was then meticulously closed in layered   fashion using 3-0 Vicryl and 3-0 nylon.  Sterile dressings were applied.    Tourniquet was released.  All toes were pink.  His foot was pink.  He was   placed into a posterior tong splint.  He was transferred to the recovery room   in good condition.    Utilization of Dr. Richardson was necessary for patient positioning, holding,   retracting, wound closure, dressing placement and splint placement.  He was   present throughout the entire procedure.       ____________________________________     MD MATTY BARNES / KATARZYNA    DD:  07/02/2018 15:19:58  DT:  07/02/2018 16:05:27    D#:  [id number]  Job#:  [id number]    cc: [facility]

## 2018-07-02 NOTE — PROGRESS NOTES
Pharmacy Kinetics 47 y.o. male on vancomycin day #2    2018    Currently on Vancomycin 1600 mg IV q12hrs (03, 15)   : Vanco load 2400 mg IV at 0222      Indication for Treatment:  SSTI - R great toe osteomyelitis    Pertinent history per medical record: Admitted on 2018 for evaluation of open R foot wound. Pt sustained trauma to the right great toe ~2.5 weeks PTA, resulting in loss of a large area of skin. He then apparently travelled to [place], where he reports swimming worsened the injury. He eventually went to a hospital in [place] where he was diagnosed with osteomyelitis and underwent transmetatarsal amputation of the R great toe. He was threatening to leave the facility AMA and was ultimately discharged with a course of oral Septra. He subsequently presented to [facility] for continued care. Patient continues to have gaping open wound with visible bone, tendons, and muscles. Ortho, LPS and ID consulting.     Other antibiotics: ampicillin/sulbactam 3 g IV q6hrs    Allergies: Patient has no known allergies.     Concerns for renal function include DM & albumin 2.7 ().    Pertinent cultures to date:   : R toe TMA site cx - No organisms see.  : peripheral blood cx X2 - NGTD    Imagin/30: R foot xray - No acute traumatic bony injury. Transmetatarsal amputation of the first toe.    Recent Labs      18   0201   WBC  9.8  8.7   NEUTSPOLYS  63.60  55.60     Recent Labs      18   0201   BUN  7*  6*   CREATININE  0.89  0.85   ALBUMIN  2.7*   --      No results for input(s): VANCOTROUGH, VANCOPEAK, VANCORANDOM in the last 72 hours.    Intake/Output Summary (Last 24 hours) at 18 0843  Last data filed at 18 1700   Gross per 24 hour   Intake             2138 ml   Output                0 ml   Net             2138 ml      Blood pressure (!) 182/97, pulse 86, temperature 36.7 °C (98.1 °F), resp. rate 16, height 1.829 m (6'), weight 97.5 kg (215 lb),  SpO2 95 %. Temp (24hrs), Av.9 °C (98.4 °F), Min:36.7 °C (98.1 °F), Max:37 °C (98.6 °F)      A/P   1. Vancomycin dose change: Continue current regimen  2. Next vancomycin level: 7/3 at 0230  3. Goal trough: 12-16 mcg/mL  4. Comments: Patient remains on antibiotics for treatment of his wound, wound photos reviewed. Patient pending OR for revision of his previous amputation. Wound and blood cultures remain negative thus far and renal indices remain wnl following admission. Will continue current vancomycin regimen and plan trough in AM to evaluate and adjust as necessary.    Pharmacy will continue to follow.     Martha Ocampo, PharmD

## 2018-07-02 NOTE — CONSULTS
DATE OF SERVICE:  07/01/2018    INFECTIOUS DISEASE CONSULTATION    REASON FOR CONSULT:  Diabetic foot ulceration, osteomyelitis.    CONSULTING PHYSICIAN:  Limb preservation service and Dr. Hall.    HISTORY OF PRESENT ILLNESS:  This is a 47-year-old white male with history of   noninsulin-dependent diabetes, who approximately 2 weeks prior to admission   injured his right toe that has been denuding the skin.  As a result of that,   he developed worsening pain and erythema.  Subsequently, he is somewhat vague   on the chronicity bed.  When he was on vacation in [place], he went swimming and toe seemed to   get worse, so went to the hospital and had an MRI, which showed   osteomyelitis and he underwent amputation of the first digit.  He was given   some oral Bactrim postoperatively, it does not sound like he received proper   wound care.  He is planned for wound VAC instead of wet to dry dressing.  The   wound continued to deteriorate.  He was taken to the hospital for further   evaluation and management.  Currently has no fever, chills, nausea, vomiting,   diarrhea, or other pain.  His wound appears grossly necrotic with visible   bone.  He is currently receiving vancomycin and Unasyn and infectious disease   is consulted for antibiotic recommendations and management.    REVIEW OF SYSTEMS:  All other systems reviewed and are negative.    ALLERGIES:  He has no known antibiotic allergies.    PAST MEDICAL HISTORY:  Noninsulin-dependent diabetes, on oral medication.    FAMILY HISTORY:  Diabetes, heart disease.    SOCIAL HISTORY:  Does not smoke, drink or use illicit drugs.    PHYSICAL EXAMINATION:  GENERAL:  He is a well-nourished, well-developed male in no acute distress,   pleasant and cooperative.  VITAL SIGNS:  He has been afebrile, current temperature is 98, blood pressure   159/91, pulse 91, respiratory rate 14, oxygen saturation 93% to 95% on room   air, weighs 97 kilos.  HEENT:  Normocephalic, atraumatic.   Pupils equal, round, reactive to light.    Extraocular movements intact.  Oropharynx clear.  NECK:  Supple.  CARDIOVASCULAR:  Regular rate and rhythm.  CHEST:  Clear to auscultation bilaterally, unlabored.  ABDOMEN:  Obese, soft, nontender, nondistended.  EXTREMITIES:  No cyanosis, clubbing, or edema.  However, he has a very large   6.5 x 3.5 x 3 cm open wound on his medial right foot.  There is necrotic   tissue and visible bone.    ASSESSMENT AND PLAN:  A 47-year-old diabetic male [facility], now with significant   deterioration of the surgical site with visible bone and necrotic skin   ulceration.  He is ordered coir.  He is currently afebrile without   leukocytosis.  Wound does not appear viable.  He is planned for surgery   tomorrow, likely will require amputation.  His prognosis of limb salvage is   poor; however, this will be ultimately determined by surgery.  We will   continue on the vancomycin and Unasyn.  Further recommendations per culture   results and clinical course.  His diabetes has been very poorly controlled.    His hemoglobin A1c is 13.6.  Further recommendations per culture results and   clinical course.    Thank you and we will follow with you.       ____________________________________     MD ANTONIO AMARO / KATARZYNA    DD:  07/01/2018 18:50:34  DT:  07/01/2018 20:48:11    D#:  [id number]  Job#:  [id number]

## 2018-07-02 NOTE — CARE PLAN
Problem: Safety  Goal: Will remain free from injury  Outcome: PROGRESSING AS EXPECTED  Patient has remained from further injury this shift. Call light is within reach, bed is locked in lowest position, and pt is rounded on hourly    Problem: Pain Management  Goal: Pain level will decrease to patient's comfort goal  Outcome: PROGRESSING AS EXPECTED  Patient has been able to sleep and ambulate with current levels of pain

## 2018-07-02 NOTE — PROGRESS NOTES
[facility] Hospitalist Progress Note    Date of Service: 7/2/2018    Chief Complaint  47 y.o. male admitted 6/30/2018 with history of diabetes mellitus on oral medication regimen was in his usual state of health until approximately 2-1/2 weeks prior to admission. While walking in his home, he injured his right great toe on the door threshold, removing the skin almost entirely. Over the next several days, he noted worsening pain, the toe looked infected. He did have a vacation out of town near [place], during which time he did go swimming in the leg, however throughout this time the infection seemed to get worse. He subsequently presented to Hospital in [place], and through the course of evaluation with MRI, he apparently was found to have osteomyelitis, and underwent transmetatarsal amputation of the 1st digit on 6/29/18. Patient states his father threatened to take the patient AMA in order to get back to [place], he was then discharged from the hospital. He was given a course of Bactrim therapy and told to find a way to get a wound VAC. A wet-to-dry dressing was placed, and he transported himself to [facility] emergency department. He has no fever or chills, he is able to walk on the remaining toes with offloading shoe provided to patient.      Interval Problem Update  7/1:  Viewed on media tab with large, extensive amount of exposed tissue, tendon, bone, fatty tissue seen, erythema at wound edges extending into forefoot. Made NPO at MN since will need surgical debridement of extensive wound wound.  Coags ordered, on vanco and unasyn, ivfs 83/hr.  XRay negative for OM, elevated CRP 7.23, added sed rate, BCs x2.  7/2:  Plan for OR today with Dr. Bernardo.  Spoke with patient and wife at bedside.  Understand likely TMA and ok with that.  BP elevated, started metoprolol 25 bid, hydralazine prn elevated bp.  Continue unasyn/vanco IV.    Consultants/Specialty  LPS   ROSHAN Bernardo    Disposition  PT/OT post op.        Review of Systems    Constitutional: Negative for chills, diaphoresis, fever and malaise/fatigue.   HENT: Negative for congestion and sore throat.    Eyes: Negative for pain and discharge.   Respiratory: Negative for cough, hemoptysis, sputum production, shortness of breath and wheezing.    Cardiovascular: Negative for chest pain, palpitations, claudication and leg swelling.   Gastrointestinal: Negative for abdominal pain, constipation, diarrhea, melena, nausea and vomiting.   Genitourinary: Negative for dysuria, frequency and urgency.   Musculoskeletal: Negative for back pain, joint pain, myalgias and neck pain.   Skin: Negative for itching and rash.        Open wound rt great toe s/p amputation w/o closure.   Neurological: Negative for dizziness, sensory change, speech change, focal weakness, loss of consciousness, weakness and headaches.   Endo/Heme/Allergies: Does not bruise/bleed easily.   Psychiatric/Behavioral: Negative for depression, substance abuse and suicidal ideas.      Physical Exam  Laboratory/Imaging   Hemodynamics  Temp (24hrs), Av.6 °C (97.9 °F), Min:36.2 °C (97.2 °F), Max:37 °C (98.6 °F)   Temperature: 36.4 °C (97.6 °F)  Pulse  Av.9  Min: 80  Max: 100 Heart Rate (Monitored): 84  Blood Pressure: 143/73, NIBP: 158/88      Respiratory      Respiration: 19, Pulse Oximetry: 94 %             Fluids    Intake/Output Summary (Last 24 hours) at 18 1506  Last data filed at 18 1402   Gross per 24 hour   Intake             3038 ml   Output               10 ml   Net             3028 ml       Nutrition  Orders Placed This Encounter   Procedures   • Diet Order Consistent Carbohydrate     Standing Status:   Standing     Number of Occurrences:   1     Order Specific Question:   Diet:     Answer:   Consistent Carbohydrate [4]     Physical Exam   Constitutional: He is oriented to person, place, and time. He appears well-developed and well-nourished. No distress.   HENT:   Head: Normocephalic and atraumatic.    Mouth/Throat: Oropharynx is clear and moist. No oropharyngeal exudate.   Eyes: Conjunctivae and EOM are normal. Pupils are equal, round, and reactive to light. Right eye exhibits no discharge. Left eye exhibits no discharge. No scleral icterus.   Neck: Normal range of motion. Neck supple. No JVD present. No tracheal deviation present. No thyromegaly present.   Cardiovascular: Normal rate, regular rhythm and normal heart sounds.  Exam reveals no gallop and no friction rub.    No murmur heard.  Pulmonary/Chest: Effort normal and breath sounds normal. No respiratory distress. He has no wheezes. He has no rales. He exhibits no tenderness.   Abdominal: Soft. Bowel sounds are normal. He exhibits no distension and no mass. There is no tenderness. There is no rebound and no guarding.   Musculoskeletal: Normal range of motion. He exhibits no edema or tenderness.   Right great toe reviewed media tab image since wound care had just been bandaged.  Large, extensive involvement seen into the forefoot with exposed tissue, bone, fat, tendons, surrounding erythema of surgical wound edge.  Will need OR debridement.   Lymphadenopathy:     He has no cervical adenopathy.   Neurological: He is alert and oriented to person, place, and time. No cranial nerve deficit. He exhibits normal muscle tone.   Skin: Skin is warm and dry. No rash noted. He is not diaphoretic. No erythema.   Psychiatric: He has a normal mood and affect. His behavior is normal. Judgment and thought content normal.   Nursing note and vitals reviewed.      Recent Labs      06/30/18 2120 07/02/18   0201   WBC  9.8  8.7   RBC  4.52*  4.51*   HEMOGLOBIN  12.9*  13.2*   HEMATOCRIT  38.2*  38.3*   MCV  84.5  84.9   MCH  28.5  29.3   MCHC  33.8  34.5   RDW  36.3  36.5   PLATELETCT  308  335   MPV  9.4  9.5     Recent Labs      06/30/18 2120 07/02/18   0201   SODIUM  138  139   POTASSIUM  3.8  3.6   CHLORIDE  102  105   CO2  23  25   GLUCOSE  133*  192*   BUN  7*  6*    CREATININE  0.89  0.85   CALCIUM  8.4*  8.5     Recent Labs      07/02/18   0201   APTT  31.3   INR  1.14*                  Assessment/Plan     * Subacute osteomyelitis of right foot (HCC)   Assessment & Plan    Exposed bone, right metatarsal shaft stump, bleeding noted from the same.   7/2:  vanco, unasyn IV.  ID consultation appreciated, wound culture no growth thus far, BCs negative.        Status post amputation of great toe, right (HCC)   Assessment & Plan    Transmetatarsal amputation of right great toe, open wound, bleeding noted from metatarsal stump.  Patient was apparently told to get a wound vac placed after being discharged. limb preservation service consult appreciated.  NPO at [facility] for definite need for OR debridement in a.m.  coags ordered.  Hold dvt prophylaxis in a.m.  Xray neg for OM, but elevated CRP 7.23, sed rate and BCs x 2 ordered .  7/2:  OR today with Dr. Bernardo.          Overweight (BMI 25.0-29.9)   Assessment & Plan    Body mass index is 29.16 kg/m².          Diabetes mellitus with coincident hypertension ([facility])   Assessment & Plan    On admission started low dose ACEI for asif-protective effects in DM.    7/2:  Added metoprolol 25mg po bid, hydralazine 25mg po q 4 hours prn Bp >160/90.        Type 2 diabetes mellitus with hyperglycemia, without long-term current use of insulin ([facility])   Assessment & Plan    On oral medication regimen as outpatient.    hgba1c 13.6%, insulin basal and prandial dosing while hospitalized started.  7/2:  Blood sugars better controlled in hospital.        Normocytic anemia   Assessment & Plan    Likely due to chronic disease without notable blood loss.  Transfuse if needed for hemoglobin less than 7 gm/dL            Quality-Core Measures

## 2018-07-02 NOTE — CONSULTS
Orthopedic Physician Note    Subjective:  Patient is a bit frustrated with his situation.  He felt that the toe may have been treated a bit better at the initial surgery.  He would like something more definitive if possible.    Objective:  RLE: great toe amputation completed with exposed bone and underlying tissue.  Some necrotic edges of skin surrounding area of amputation.  Surrounding erythema with excessive drainage from amputation site.  Diminished/absent sensation to toes with slight feeling at dorsum of foot.    Blood pressure (!) 168/92, pulse 84, temperature 37 °C (98.6 °F), resp. rate 16, height 1.829 m (6'), weight 97.5 kg (215 lb), SpO2 94 %.    Labs:  Recent Labs      06/30/18 2120 07/02/18   0201   WBC  9.8  8.7   RBC  4.52*  4.51*   HEMOGLOBIN  12.9*  13.2*   HEMATOCRIT  38.2*  38.3*   MCV  84.5  84.9   MCH  28.5  29.3   RDW  36.3  36.5   PLATELETCT  308  335   MPV  9.4  9.5   NEUTSPOLYS  63.60  55.60   LYMPHOCYTES  22.00  29.90   MONOCYTES  10.30  9.00   EOSINOPHILS  2.90  3.90   BASOPHILS  0.70  0.70         Recent Labs      06/30/18 2120 07/02/18   0201   SODIUM  138  139   POTASSIUM  3.8  3.6   CHLORIDE  102  105   CO2  23  25   GLUCOSE  133*  192*   BUN  7*  6*       Results     Procedure Component Value Units Date/Time    GRAM STAIN [[id number]] Collected:  07/01/18 1320    Order Status:  Completed Specimen:  Wound Updated:  07/02/18 0638     Significant Indicator .     Source WND     Site TOE     Gram Stain Result No organisms seen.    Narrative:       Collected By:[id number] MARJORIE KULKARNI  Right great toe TMA site.    CULTURE WOUND W/ GRAM STAIN [[id number]] Collected:  07/01/18 1320    Order Status:  Completed Specimen:  Wound from Toe Updated:  07/01/18 1330    Narrative:       Collected By:[id number] MARJORIE KULKARNI  Right great toe TMA site.    BLOOD CULTURE x2 [[id number]] Collected:  06/30/18 2120    Order Status:  Completed Specimen:  Blood from Peripheral Updated:   "07/01/18 0718     Significant Indicator NEG     Source BLD     Site PERIPHERAL     Blood Culture No Growth    Note: Blood cultures are incubated for 5 days and  are monitored continuously.Positive blood cultures  are called to the RN and reported as soon as  they are identified.      Narrative:       Per Hospital Policy: Only change Specimen Src: to \"Line\" if  specified by physician order.    BLOOD CULTURE x2 [[id number]] Collected:  06/30/18 2154    Order Status:  Completed Specimen:  Blood from Peripheral Updated:  07/01/18 0718     Significant Indicator NEG     Source BLD     Site PERIPHERAL     Blood Culture No Growth    Note: Blood cultures are incubated for 5 days and  are monitored continuously.Positive blood cultures  are called to the RN and reported as soon as  they are identified.      Narrative:       Per Hospital Policy: Only change Specimen Src: to \"Line\" if  specified by physician order.    URINALYSIS,CULTURE IF INDICATED [[id number]]  (Abnormal) Collected:  07/01/18 0630    Order Status:  Completed Specimen:  Urine Updated:  07/01/18 0658     Color Yellow     Character Clear     Specific Gravity 1.020     Ph 6.0     Glucose 500 (A) mg/dL      Ketones >=80 (A) mg/dL      Protein 100 (A) mg/dL      Bilirubin Negative     Urobilinogen, Urine 0.2     Nitrite Negative     Leukocyte Esterase Negative     Occult Blood Moderate (A)     Micro Urine Req Microscopic          Assessment:  s/p 1st Ray amputation at outside facility    Plan:  Will take to OR for definitive treatment of previous amputation  Keep NPO   Discussed options with the patient which include transverse metatarsal amputation  Will follow      Libby Richardson  Ortho F&A fellow  Cell: [phone]  07/02/18            "

## 2018-07-03 ENCOUNTER — APPOINTMENT (OUTPATIENT)
Dept: RADIOLOGY | Facility: MEDICAL CENTER | Age: 48
DRG: 475 | End: 2018-07-03
Attending: NURSE PRACTITIONER
Payer: COMMERCIAL

## 2018-07-03 LAB
BACTERIA WND AEROBE CULT: ABNORMAL
BACTERIA WND AEROBE CULT: ABNORMAL
BASOPHILS # BLD AUTO: 0.4 % (ref 0–1.8)
BASOPHILS # BLD: 0.05 K/UL (ref 0–0.12)
EOSINOPHIL # BLD AUTO: 0.05 K/UL (ref 0–0.51)
EOSINOPHIL NFR BLD: 0.4 % (ref 0–6.9)
ERYTHROCYTE [DISTWIDTH] IN BLOOD BY AUTOMATED COUNT: 37.3 FL (ref 35.9–50)
GLUCOSE BLD-MCNC: 149 MG/DL (ref 65–99)
GLUCOSE BLD-MCNC: 191 MG/DL (ref 65–99)
GLUCOSE BLD-MCNC: 195 MG/DL (ref 65–99)
GLUCOSE BLD-MCNC: 200 MG/DL (ref 65–99)
GRAM STN SPEC: ABNORMAL
HCT VFR BLD AUTO: 35.7 % (ref 42–52)
HGB BLD-MCNC: 12.3 G/DL (ref 14–18)
IMM GRANULOCYTES # BLD AUTO: 0.14 K/UL (ref 0–0.11)
IMM GRANULOCYTES NFR BLD AUTO: 1.1 % (ref 0–0.9)
LYMPHOCYTES # BLD AUTO: 1.85 K/UL (ref 1–4.8)
LYMPHOCYTES NFR BLD: 14.9 % (ref 22–41)
MCH RBC QN AUTO: 29.6 PG (ref 27–33)
MCHC RBC AUTO-ENTMCNC: 34.5 G/DL (ref 33.7–35.3)
MCV RBC AUTO: 85.8 FL (ref 81.4–97.8)
MONOCYTES # BLD AUTO: 0.87 K/UL (ref 0–0.85)
MONOCYTES NFR BLD AUTO: 7 % (ref 0–13.4)
NEUTROPHILS # BLD AUTO: 9.46 K/UL (ref 1.82–7.42)
NEUTROPHILS NFR BLD: 76.2 % (ref 44–72)
NRBC # BLD AUTO: 0 K/UL
NRBC BLD-RTO: 0 /100 WBC
PLATELET # BLD AUTO: 310 K/UL (ref 164–446)
PMV BLD AUTO: 9.3 FL (ref 9–12.9)
RBC # BLD AUTO: 4.16 M/UL (ref 4.7–6.1)
SIGNIFICANT IND 70042: ABNORMAL
SITE SITE: ABNORMAL
SOURCE SOURCE: ABNORMAL
VANCOMYCIN TROUGH SERPL-MCNC: 13.2 UG/ML (ref 10–20)
WBC # BLD AUTO: 12.4 K/UL (ref 4.8–10.8)

## 2018-07-03 PROCEDURE — 700102 HCHG RX REV CODE 250 W/ 637 OVERRIDE(OP): Performed by: INTERNAL MEDICINE

## 2018-07-03 PROCEDURE — 82962 GLUCOSE BLOOD TEST: CPT

## 2018-07-03 PROCEDURE — 700111 HCHG RX REV CODE 636 W/ 250 OVERRIDE (IP): Performed by: NURSE PRACTITIONER

## 2018-07-03 PROCEDURE — 36415 COLL VENOUS BLD VENIPUNCTURE: CPT

## 2018-07-03 PROCEDURE — 99232 SBSQ HOSP IP/OBS MODERATE 35: CPT | Performed by: INTERNAL MEDICINE

## 2018-07-03 PROCEDURE — 700102 HCHG RX REV CODE 250 W/ 637 OVERRIDE(OP): Performed by: HOSPITALIST

## 2018-07-03 PROCEDURE — 700111 HCHG RX REV CODE 636 W/ 250 OVERRIDE (IP): Performed by: HOSPITALIST

## 2018-07-03 PROCEDURE — 02HV33Z INSERTION OF INFUSION DEVICE INTO SUPERIOR VENA CAVA, PERCUTANEOUS APPROACH: ICD-10-PCS | Performed by: INTERNAL MEDICINE

## 2018-07-03 PROCEDURE — 85025 COMPLETE CBC W/AUTO DIFF WBC: CPT

## 2018-07-03 PROCEDURE — A9270 NON-COVERED ITEM OR SERVICE: HCPCS | Performed by: INTERNAL MEDICINE

## 2018-07-03 PROCEDURE — B548ZZA ULTRASONOGRAPHY OF SUPERIOR VENA CAVA, GUIDANCE: ICD-10-PCS | Performed by: INTERNAL MEDICINE

## 2018-07-03 PROCEDURE — A9270 NON-COVERED ITEM OR SERVICE: HCPCS | Performed by: HOSPITALIST

## 2018-07-03 PROCEDURE — 36569 INSJ PICC 5 YR+ W/O IMAGING: CPT

## 2018-07-03 PROCEDURE — 770006 HCHG ROOM/CARE - MED/SURG/GYN SEMI*

## 2018-07-03 PROCEDURE — 700105 HCHG RX REV CODE 258: Performed by: HOSPITALIST

## 2018-07-03 PROCEDURE — 80202 ASSAY OF VANCOMYCIN: CPT

## 2018-07-03 RX ORDER — METOPROLOL TARTRATE 50 MG/1
50 TABLET, FILM COATED ORAL TWICE DAILY
Status: DISCONTINUED | OUTPATIENT
Start: 2018-07-03 | End: 2018-07-04

## 2018-07-03 RX ORDER — LIDOCAINE HYDROCHLORIDE 10 MG/ML
2 INJECTION, SOLUTION INFILTRATION; PERINEURAL
Status: DISCONTINUED | OUTPATIENT
Start: 2018-07-03 | End: 2018-07-06 | Stop reason: HOSPADM

## 2018-07-03 RX ORDER — CEFAZOLIN SODIUM 2 G/100ML
2 INJECTION, SOLUTION INTRAVENOUS EVERY 8 HOURS
Status: DISCONTINUED | OUTPATIENT
Start: 2018-07-03 | End: 2018-07-06

## 2018-07-03 RX ADMIN — INSULIN GLARGINE 10 UNITS: 100 INJECTION, SOLUTION SUBCUTANEOUS at 21:34

## 2018-07-03 RX ADMIN — INSULIN LISPRO 1 UNITS: 100 INJECTION, SOLUTION INTRAVENOUS; SUBCUTANEOUS at 11:39

## 2018-07-03 RX ADMIN — METOPROLOL TARTRATE 50 MG: 50 TABLET ORAL at 21:17

## 2018-07-03 RX ADMIN — CEFAZOLIN SODIUM 2 G: 2 INJECTION, SOLUTION INTRAVENOUS at 21:16

## 2018-07-03 RX ADMIN — INSULIN LISPRO 4 UNITS: 100 INJECTION, SOLUTION INTRAVENOUS; SUBCUTANEOUS at 06:41

## 2018-07-03 RX ADMIN — INSULIN LISPRO 4 UNITS: 100 INJECTION, SOLUTION INTRAVENOUS; SUBCUTANEOUS at 11:38

## 2018-07-03 RX ADMIN — CEFAZOLIN SODIUM 2 G: 2 INJECTION, SOLUTION INTRAVENOUS at 16:06

## 2018-07-03 RX ADMIN — SODIUM CHLORIDE: 9 INJECTION, SOLUTION INTRAVENOUS at 11:37

## 2018-07-03 RX ADMIN — METOPROLOL TARTRATE 25 MG: 25 TABLET, FILM COATED ORAL at 08:22

## 2018-07-03 RX ADMIN — OXYCODONE HYDROCHLORIDE 5 MG: 5 TABLET ORAL at 18:07

## 2018-07-03 RX ADMIN — INSULIN LISPRO 4 UNITS: 100 INJECTION, SOLUTION INTRAVENOUS; SUBCUTANEOUS at 16:40

## 2018-07-03 RX ADMIN — AMPICILLIN SODIUM AND SULBACTAM SODIUM 3 G: 2; 1 INJECTION, POWDER, FOR SOLUTION INTRAMUSCULAR; INTRAVENOUS at 06:08

## 2018-07-03 RX ADMIN — INSULIN LISPRO 1 UNITS: 100 INJECTION, SOLUTION INTRAVENOUS; SUBCUTANEOUS at 21:33

## 2018-07-03 RX ADMIN — OXYCODONE HYDROCHLORIDE 5 MG: 5 TABLET ORAL at 08:22

## 2018-07-03 RX ADMIN — OXYCODONE HYDROCHLORIDE 5 MG: 5 TABLET ORAL at 21:18

## 2018-07-03 RX ADMIN — INSULIN LISPRO 1 UNITS: 100 INJECTION, SOLUTION INTRAVENOUS; SUBCUTANEOUS at 16:39

## 2018-07-03 RX ADMIN — VANCOMYCIN HYDROCHLORIDE 1600 MG: 100 INJECTION, POWDER, LYOPHILIZED, FOR SOLUTION INTRAVENOUS at 03:19

## 2018-07-03 RX ADMIN — AMPICILLIN SODIUM AND SULBACTAM SODIUM 3 G: 2; 1 INJECTION, POWDER, FOR SOLUTION INTRAMUSCULAR; INTRAVENOUS at 11:38

## 2018-07-03 RX ADMIN — OXYCODONE HYDROCHLORIDE 5 MG: 5 TABLET ORAL at 00:20

## 2018-07-03 ASSESSMENT — ENCOUNTER SYMPTOMS
ABDOMINAL PAIN: 0
NAUSEA: 0
CONSTIPATION: 1
SHORTNESS OF BREATH: 0
VOMITING: 0
FEVER: 0
ROS GI COMMENTS: LAST BM 4 DAYS AGO
DIARRHEA: 0
CHILLS: 0
HEADACHES: 0
MYALGIAS: 1

## 2018-07-03 ASSESSMENT — PAIN SCALES - GENERAL
PAINLEVEL_OUTOF10: 7
PAINLEVEL_OUTOF10: 0
PAINLEVEL_OUTOF10: 6
PAINLEVEL_OUTOF10: 2
PAINLEVEL_OUTOF10: 0

## 2018-07-03 NOTE — CARE PLAN
Problem: Communication  Goal: The ability to communicate needs accurately and effectively will improve  Outcome: PROGRESSING AS EXPECTED  Pt. Aware of plan of care at this time. Able to communicate needs affectively as needed. Questions answered and understanding/learning verified by teach back method. Pt. Is now resting comfortably in bed and understands to use bell to voice concerns/needs as they arise. Hourly rounding in place.    Problem: Safety  Goal: Will remain free from injury  Outcome: PROGRESSING AS EXPECTED  Pt. Has not had a fall this shift, oriented to unit/surroundings, calls for assistance prior to ambulation, call bell/belongings with in reach. VSS.

## 2018-07-03 NOTE — CARE PLAN
Problem: Safety  Goal: Will remain free from injury  Outcome: PROGRESSING AS EXPECTED  Patient has remained free from further injury this shift. Call light is within reach, bed is locked in lowest position, and patient is rounded on hourly    Problem: Pain Management  Goal: Pain level will decrease to patient's comfort goal  Outcome: PROGRESSING AS EXPECTED  Patient has been received prn pain meds since doctor instructed to take them so he is not in excruciating pain when the nerve wears off. Patient has not complained of pain yet this shift but does not yet have feeling from mid shin down on his right lower extremity

## 2018-07-03 NOTE — PROGRESS NOTES
Infectious Disease Progress Note    Author: ELEAZAR Limon Date & Time of service: 7/3/2018  12:19 PM    Chief Complaint:  Diabetic foot infection    Interval History:  2018-MAXIMUM TEMPERATURE 98.6. Underwent surgery today. WBCs 8.7 platelets 335 creatinine 0.85  7/3- AF, WBC 12.4, mild pain to RLE being controlled with pain meds, currently denies any pain, no issue with abx, agreeable to PICC.   Labs Reviewed, Medications Reviewed, Radiology Reviewed and Wound Reviewed.    Review of Systems:  Review of Systems   Constitutional: Negative for chills, fever and malaise/fatigue.   HENT: Negative for hearing loss.    Respiratory: Negative for shortness of breath.    Cardiovascular: Negative for chest pain.   Gastrointestinal: Positive for constipation. Negative for abdominal pain, diarrhea, nausea and vomiting.        Last BM 4 days ago   Genitourinary: Negative for dysuria.   Musculoskeletal: Positive for joint pain and myalgias.        RLE   Skin: Negative for rash.   Neurological: Negative for headaches.       Hemodynamics:  Temp (24hrs), Av.4 °C (97.6 °F), Min:36.2 °C (97.1 °F), Max:36.7 °C (98.1 °F)  Temperature: 36.6 °C (97.8 °F)  Pulse  Av.1  Min: 77  Max: 100Heart Rate (Monitored): 84  Blood Pressure: (!) 174/85, NIBP: 158/88       Physical Exam:  Physical Exam   Constitutional: He is oriented to person, place, and time. He appears well-developed and well-nourished. No distress.   HENT:   Head: Normocephalic and atraumatic.   Eyes: EOM are normal. Pupils are equal, round, and reactive to light.   Neck: Normal range of motion. Neck supple.   Cardiovascular: Normal rate, regular rhythm and intact distal pulses.  Exam reveals no gallop and no friction rub.    Pulmonary/Chest: Effort normal and breath sounds normal. No respiratory distress. He has no wheezes.   Abdominal: Soft. Bowel sounds are normal. He exhibits no distension. There is no tenderness.   Musculoskeletal: He exhibits  tenderness.   RLE- original surgical bandage in place, hemovac with mild bloody/ ss drainage, able to wiggle toes.     Neurological: He is alert and oriented to person, place, and time.   Skin: Skin is warm and dry. No erythema.   Abrasions on the lower extremities   Nursing note and vitals reviewed.      Meds:    Current Facility-Administered Medications:   •  lidocaine  •  metoprolol  •  hydrALAZINE  •  Notify provider if pain remains uncontrolled **AND** Use the numeric rating scale (NRS-11) on regular floors and Critical-Care Pain Observation Tool (CPOT) on ICUs/Trauma to assess pain **AND** Pulse Ox (Oximetry) **AND** Pharmacy Consult Request **AND** If patient difficult to arouse and/or has respiratory depression, stop any opiates that are currently infusing and call a Rapid Response. **AND** oxyCODONE immediate-release **AND** oxyCODONE immediate-release **AND** morphine injection  •  vancomycin  •  NS  •  cloNIDine  •  ondansetron  •  ondansetron  •  promethazine  •  promethazine  •  prochlorperazine  •  senna-docusate **AND** polyethylene glycol/lytes **AND** magnesium hydroxide **AND** bisacodyl  •  acetaminophen  •  insulin glargine **AND** insulin lispro **AND** insulin lispro **AND** Accu-Chek ACHS **AND** NOTIFY MD and PharmD **AND** glucose 4 g **AND** dextrose 50%  •  MD ALERT... vancomycin  •  ampicillin-sulbactam (UNASYN) IV    Labs:  Recent Labs      06/30/18   2120  07/02/18   0201  07/03/18   0210   WBC  9.8  8.7  12.4*   RBC  4.52*  4.51*  4.16*   HEMOGLOBIN  12.9*  13.2*  12.3*   HEMATOCRIT  38.2*  38.3*  35.7*   MCV  84.5  84.9  85.8   MCH  28.5  29.3  29.6   RDW  36.3  36.5  37.3   PLATELETCT  308  335  310   MPV  9.4  9.5  9.3   NEUTSPOLYS  63.60  55.60  76.20*   LYMPHOCYTES  22.00  29.90  14.90*   MONOCYTES  10.30  9.00  7.00   EOSINOPHILS  2.90  3.90  0.40   BASOPHILS  0.70  0.70  0.40     Recent Labs      06/30/18   2120  07/02/18   0201   SODIUM  138  139   POTASSIUM  3.8  3.6  "  CHLORIDE  102  105   CO2  23  25   GLUCOSE  133*  192*   BUN  7*  6*     Recent Labs      06/30/18 2120 07/02/18   0201   ALBUMIN  2.7*   --    TBILIRUBIN  0.7   --    ALKPHOSPHAT  39   --    TOTPROTEIN  6.5   --    ALTSGPT  8   --    ASTSGOT  12   --    CREATININE  0.89  0.85       Imaging:  No recent imaging found.       Micro:  Results     Procedure Component Value Units Date/Time    GRAM STAIN [[id number]] Collected:  07/02/18 1315    Order Status:  Completed Specimen:  Tissue Updated:  07/02/18 1657     Significant Indicator .     Source TISS     Site Metatarsal Margin     Gram Stain Result No organisms seen.    CULTURE TISSUE W/ GRM STAIN [[id number]] Collected:  07/02/18 1315    Order Status:  Completed Specimen:  Other Updated:  07/02/18 1521    ANAEROBIC CULTURE [[id number]] Collected:  07/02/18 1315    Order Status:  Completed Specimen:  Other Updated:  07/02/18 1521    GRAM STAIN [[id number]] Collected:  07/01/18 1320    Order Status:  Completed Specimen:  Wound Updated:  07/02/18 0638     Significant Indicator .     Source WND     Site TOE     Gram Stain Result No organisms seen.    Narrative:       Collected By:[id number] MARJORIE BRYAN.  Right great toe TMA site.    CULTURE WOUND W/ GRAM STAIN [[id number]] Collected:  07/01/18 1320    Order Status:  Completed Specimen:  Wound from Toe Updated:  07/01/18 1330    Narrative:       Collected By:[id number] MARJORIE HARDYA A.  Right great toe TMA site.    BLOOD CULTURE x2 [[id number]] Collected:  06/30/18 2120    Order Status:  Completed Specimen:  Blood from Peripheral Updated:  07/01/18 0718     Significant Indicator NEG     Source BLD     Site PERIPHERAL     Blood Culture No Growth    Note: Blood cultures are incubated for 5 days and  are monitored continuously.Positive blood cultures  are called to the RN and reported as soon as  they are identified.      Narrative:       Per Hospital Policy: Only change Specimen Src: to \"Line\" if  specified by " "physician order.    BLOOD CULTURE x2 [[id number]] Collected:  06/30/18 2154    Order Status:  Completed Specimen:  Blood from Peripheral Updated:  07/01/18 0718     Significant Indicator NEG     Source BLD     Site PERIPHERAL     Blood Culture No Growth    Note: Blood cultures are incubated for 5 days and  are monitored continuously.Positive blood cultures  are called to the RN and reported as soon as  they are identified.      Narrative:       Per Hospital Policy: Only change Specimen Src: to \"Line\" if  specified by physician order.    URINALYSIS,CULTURE IF INDICATED [[id number]]  (Abnormal) Collected:  07/01/18 0630    Order Status:  Completed Specimen:  Urine Updated:  07/01/18 0658     Color Yellow     Character Clear     Specific Gravity 1.020     Ph 6.0     Glucose 500 (A) mg/dL      Ketones >=80 (A) mg/dL      Protein 100 (A) mg/dL      Bilirubin Negative     Urobilinogen, Urine 0.2     Nitrite Negative     Leukocyte Esterase Negative     Occult Blood Moderate (A)     Micro Urine Req Microscopic          Assessment:  Active Hospital Problems    Diagnosis   • *Subacute osteomyelitis of right foot ([facility]) [M86.271]   • Status post amputation of great toe, right ([facility]) [Z89.411]   • Normocytic anemia [D64.9]   • Type 2 diabetes mellitus with hyperglycemia, without long-term current use of insulin ([facility]) [E11.65]   • Diabetes mellitus with coincident hypertension ([facility]) [E11.9, I10]   • Overweight (BMI 25.0-29.9) [E66.3]       Plan:  Diabetic foot infection/osteomyelitis  Underwent Right TMA with I&D and GSR on 7/2 with Dr. Bernardo  OR cx pending  Cx from [facility] on 6/29 +MSSA (see Media tab)  D/C IV Vanco and Unasyn  Start IV Ancef  Plan for 6 weeks IV abx d/t R I&D to multiple compartments  PICC ordered    Uncontrolled diabetes mellitus  Last hemoglobin A1c was 13.6% on 6/30/18  Control blood sugars for wound healing and control infection    Discussed with RN.  "

## 2018-07-03 NOTE — PROGRESS NOTES
Date of Insertion: 7/3/18  Arm Circumference: 33  Internal length: 46cm  External Length: 0cm  Vein Occupancy %: 32%  Reason for PICC: Antibiotic Therapy  Labs: WBC 12.4, , INR 1.14, BUN 7, Cr 0.89, GFR >60    Consents confirmed, vessel patency confirmed with ultrasound. Risks and benefits of procedure explained to patient and education regarding central line associated bloodstream infections provided. Questions answered.     PICC placed in RUE per MD order with ultrasound guidance, initial arm circumference 33cm. 4 Fr, single lumen PICC placed in basilic vein after 1 attempt(s). 1 cc's of 1% lidocaine injected intradermally, 21 gauge microintroducer needle and modified Seldinger technique used. 46 cm catheter inserted with good blood return. Secured at 0 cm marker. Each lumen flushed without resistance with 10 mL 0.9% normal saline. PICC line secured with Biopatch and Tegaderm.     PICC placement is confirmed by nurse using 3CG technology. PICC line is appropriate for use at this time. Pt tolerated procedure well.  Patient condition relayed to unit RN or ordering physician via this post procedure note in the EMR.      [employer] Power PICC ref # [id number], Lot # [id number]

## 2018-07-03 NOTE — CARE PLAN
Problem: Safety  Goal: Will remain free from injury  Outcome: PROGRESSING AS EXPECTED  Call light within reach, pt calls for assistance at all times.  Bed in low position and locked, upper bedside rails, proper mobility signs placed, personal possessions within reach, treaded socks on.  Hourly rounding in place.                  Problem: Pain Management  Goal: Pain level will decrease to patient's comfort goal  Outcome: PROGRESSING AS EXPECTED  Pain meds given as needed per orders.

## 2018-07-03 NOTE — PROGRESS NOTES
Morning report received from Felipe Bowles.  Patient a/a/o laying in bed with RLE elevated.  Room air, IV infusing as ordered, dressing/splint and hemovac intact, fall precautions in place, pt calls for assist.  POC continue abx, pt may need pt/ot ordered as well.

## 2018-07-03 NOTE — PROGRESS NOTES
Pharmacy Kinetics 47 y.o. male on vancomycin day #3    7/3/2018    Currently on Vancomycin 1600 mg IV q12hrs (03, 15)    Indication for Treatment:  SSTI - R great toe osteomyelitis    Pertinent history per medical record: Admitted on 2018 for evaluation of open R foot wound. Pt sustained trauma to the right great toe ~2.5 weeks PTA, resulting in loss of a large area of skin. He then apparently travelled to [place], where he reports swimming worsened the injury. He eventually went to a hospital in [place] where he was diagnosed with osteomyelitis and underwent transmetatarsal amputation of the R great toe. He was threatening to leave the facility AMA and was ultimately discharged with a course of oral Septra. He subsequently presented to [facility] for continued care. Patient continues to have gaping open wound with visible bone, tendons, and muscles. Ortho, LPS and ID consulting.     Other antibiotics: ampicillin/sulbactam 3 g IV q6hrs    Allergies: Patient has no known allergies.     Concerns for renal function include DM & albumin 2.7 ().    Pertinent cultures to date:   : metatarsal margin - No organisms seen on Gram stain.  : R toe TMA site cx - No organisms seen.  : peripheral blood cx X2 - NGTD    Imagin/30: R foot xray - No acute traumatic bony injury. Transmetatarsal amputation of the first toe.    Recent Labs      18   0201  18   0210   WBC  9.8  8.7  12.4*   NEUTSPOLYS  63.60  55.60  76.20*     Recent Labs      18   0201   BUN  7*  6*   CREATININE  0.89  0.85   ALBUMIN  2.7*   --      Recent Labs      18   0210   [facility]     Intake/Output Summary (Last 24 hours) at 18 0804  Last data filed at 18 1700   Gross per 24 hour   Intake             1520 ml   Output              810 ml   Net              710 ml      Blood pressure 153/95, pulse 90, temperature 36.4 °C (97.5 °F), resp. rate 16, height 1.829 m (6'), weight  97.5 kg (215 lb), SpO2 94 %. Temp (24hrs), Av.4 °C (97.5 °F), Min:36.2 °C (97.1 °F), Max:36.7 °C (98.1 °F)      A/P   1. Vancomycin dose change: Continue current regimen  2. Next vancomycin level: ~3 days (not currently ordered)  3. Goal trough: 12-16 mcg/mL  4. Comments: Patient stable on current antibiotic regimen, underwent transmetatarsal amputation of his R foot yesterday. No new renal labs available for review, steady state vanco trough obtained this AM within therapeutic range. Will continue current vancomycin dosing and plan repeat trough in ~3 days to evaluate and adjust as necessary. ID requesting cultures from [facility] where patient's initial toe amputation was completed. Antibiotic plan to be determined pending finalization of [facility] cultures as well as [facility] culture data.    Pharmacy will continue to follow.     Martha Ocampo, PharmD

## 2018-07-03 NOTE — CONSULTS
Orthopedic Physician Note    Subjective:  Patient doing very well this morning.  No issues over night.      Objective:  RLE: dressings clean and dry. Splint on and intact    Blood pressure 153/95, pulse 90, temperature 36.4 °C (97.5 °F), resp. rate 16, height 1.829 m (6'), weight 97.5 kg (215 lb), SpO2 94 %.    Labs:  Recent Labs      06/30/18 2120 07/02/18 0201 07/03/18   0210   WBC  9.8  8.7  12.4*   RBC  4.52*  4.51*  4.16*   HEMOGLOBIN  12.9*  13.2*  12.3*   HEMATOCRIT  38.2*  38.3*  35.7*   MCV  84.5  84.9  85.8   MCH  28.5  29.3  29.6   RDW  36.3  36.5  37.3   PLATELETCT  308  335  310   MPV  9.4  9.5  9.3   NEUTSPOLYS  63.60  55.60  76.20*   LYMPHOCYTES  22.00  29.90  14.90*   MONOCYTES  10.30  9.00  7.00   EOSINOPHILS  2.90  3.90  0.40   BASOPHILS  0.70  0.70  0.40         Recent Labs      06/30/18 2120 07/02/18   0201   SODIUM  138  139   POTASSIUM  3.8  3.6   CHLORIDE  102  105   CO2  23  25   GLUCOSE  133*  192*   BUN  7*  6*       Results     Procedure Component Value Units Date/Time    GRAM STAIN [[id number]] Collected:  07/02/18 1315    Order Status:  Completed Specimen:  Tissue Updated:  07/02/18 1657     Significant Indicator .     Source TISS     Site Metatarsal Margin     Gram Stain Result No organisms seen.    CULTURE TISSUE W/ GRM STAIN [[id number]] Collected:  07/02/18 1315    Order Status:  Completed Specimen:  Other Updated:  07/02/18 1521    ANAEROBIC CULTURE [[id number]] Collected:  07/02/18 1315    Order Status:  Completed Specimen:  Other Updated:  07/02/18 1521    GRAM STAIN [[id number]] Collected:  07/01/18 1320    Order Status:  Completed Specimen:  Wound Updated:  07/02/18 0638     Significant Indicator .     Source WND     Site TOE     Gram Stain Result No organisms seen.    Narrative:       Collected By:[id number] MARJORIE KULKARNI  Right great toe TMA site.    CULTURE WOUND W/ GRAM STAIN [[id number]] Collected:  07/01/18 1320    Order Status:  Completed Specimen:  Wound from  "Toe Updated:  07/01/18 1330    Narrative:       Collected By:[id number] MARJORIE KULKARNI  Right great toe TMA site.    BLOOD CULTURE x2 [[id number]] Collected:  06/30/18 2120    Order Status:  Completed Specimen:  Blood from Peripheral Updated:  07/01/18 0718     Significant Indicator NEG     Source BLD     Site PERIPHERAL     Blood Culture No Growth    Note: Blood cultures are incubated for 5 days and  are monitored continuously.Positive blood cultures  are called to the RN and reported as soon as  they are identified.      Narrative:       Per Hospital Policy: Only change Specimen Src: to \"Line\" if  specified by physician order.    BLOOD CULTURE x2 [[id number]] Collected:  06/30/18 2154    Order Status:  Completed Specimen:  Blood from Peripheral Updated:  07/01/18 0718     Significant Indicator NEG     Source BLD     Site PERIPHERAL     Blood Culture No Growth    Note: Blood cultures are incubated for 5 days and  are monitored continuously.Positive blood cultures  are called to the RN and reported as soon as  they are identified.      Narrative:       Per Hospital Policy: Only change Specimen Src: to \"Line\" if  specified by physician order.    URINALYSIS,CULTURE IF INDICATED [[id number]]  (Abnormal) Collected:  07/01/18 0630    Order Status:  Completed Specimen:  Urine Updated:  07/01/18 0658     Color Yellow     Character Clear     Specific Gravity 1.020     Ph 6.0     Glucose 500 (A) mg/dL      Ketones >=80 (A) mg/dL      Protein 100 (A) mg/dL      Bilirubin Negative     Urobilinogen, Urine 0.2     Nitrite Negative     Leukocyte Esterase Negative     Occult Blood Moderate (A)     Micro Urine Req Microscopic          Assessment:  s/p R TMA    Plan:  NWB to the RLE  Continue splint at all times  Push nutrition and high protien diet  Control diabetes in the post operative period  Will follow  f/u wound care 2 weeks  Ok to discharge from orthopedic standpoint      Libby Meyers F&A fellow  Cell: " [phone]  07/03/18

## 2018-07-03 NOTE — CONSULTS
DATE OF SERVICE:  06/30/2018    CONSULTING PHYSICIAN:  Elaina Augila MD    REASON FOR CONSULT:  Right foot wound infection.    HISTORY OF PRESENT ILLNESS:  The patient is a 47-year-old male with diabetes.    He had a great toe injury approximately 2-1/2 weeks ago.  He noticed that he   is having worsening pain and he went on vacation in [place].  He is admitted to   the hospital after swimming, noted worsening of his infection.  A podiatrist   there amputated his great toe and left a large wound and per him instructed   him to return home and go to the emergency room.  He was discharged home on   Bactrim.  He had just wet-to-dry dressing changes.  He did transfer himself.    Otherwise, doing okay.  He denies any fevers or chills.  He does have a little   bit of general malaise.    Review of systems, past medical history, surgical history, family history,   social history, allergies, medications are reviewed on Dr. Puga's admit note   dated 06/30/2018.    PHYSICAL EXAMINATION:  VITAL SIGNS:  Afebrile, vital signs stable.  GENERAL:  Well-appearing male in no acute distress.  PSYCHIATRIC:  Pleasant demeanor, normal affect.  EYES:  Pupils equal, round.  RESPIRATIONS:  Regular rate, unlabored breathing.  CARDIOVASCULAR:  He has palpable dorsalis pedis and posterior tibial pulses.    Regular rate and rhythm.  Brisk capillary refill.  NEUROLOGIC:  Sensation is diminished to his toes.  ____ muscle strength.  SKIN:  Shows a large wound over the medial aspect of his foot.  There are some   necrotic margins.  There is full thickness going down to exposed muscle,   tendon and bone.  MUSCULOSKELETAL:  He is missing his first ray on the right.  The rest of his   toes have good overall alignment.  He has good range of motion.  He has no   instability.  Muscle strength is satisfactory.  He is nonambulatory.    IMAGING:  X-rays are reviewed, demonstrate a first ray amputation.    LABORATORY DATA:  Reviewed, demonstrate a glucose of  192.  His white blood   cell count is 8.7.  Glycohemoglobin is 13.6.  Sed rate 82.    IMPRESSION:  1.  Poorly controlled diabetic.  2.  Right first ray amputation done at an outside facility presenting with   larger wound.    PLAN:  I had a long discussion with the patient and his wife about his options   including operative and nonoperative.  At this point, he wants to proceed   with operative intervention.  I recommended a right transmetatarsal amputation   with irrigation and debridement as well as a gastroc recession.  Procedure   and postoperative course were discussed in detail and all questions were   answered.  Risks of surgery were explained, which include, but not limited to   wound problems, infection, nerve injury, vascular injury, need for further   surgery.  He understands he could have persistent risk of his infection and   wound and potential for higher level of amputation.  He understands and   accepts these risks and agrees to proceed.  The site was marked by myself.       ____________________________________     MD MATTY BARNES / KATARZYNA    DD:  07/02/2018 15:16:01  DT:  07/02/2018 17:15:32    D#:  [id number]  Job#:  [id number]    cc: [facility]

## 2018-07-03 NOTE — PROGRESS NOTES
[facility]ist Progress Note    Date of Service: 7/3/2018    Chief Complaint  47 y.o. male admitted 2018 with DM who injured his right great toe who developed osteomyelitis s/p amputation 18.     Interval Problem Update  Patient feels well  cx pending    Consultants/Specialty  ID  Ortho    Disposition  Wound care 2 weeks  NWB to [facility]  PICC in place        Review of Systems   Constitutional: Negative for fever.   HENT: Negative for congestion.    Respiratory: Negative for shortness of breath.    Cardiovascular: Negative for chest pain.   Gastrointestinal: Negative for abdominal pain.   Musculoskeletal: Positive for joint pain.   Skin: Negative for itching.   Neurological: Negative for headaches.      Physical Exam  Laboratory/Imaging   Hemodynamics  Temp (24hrs), Av.4 °C (97.6 °F), Min:36.2 °C (97.1 °F), Max:36.7 °C (98.1 °F)   Temperature: 36.6 °C (97.8 °F)  Pulse  Av.1  Min: 77  Max: 100    Blood Pressure: (!) 174/85      Respiratory      Respiration: 16, Pulse Oximetry: 95 %             Fluids    Intake/Output Summary (Last 24 hours) at 18 1531  Last data filed at 18 1300   Gross per 24 hour   Intake             1160 ml   Output              800 ml   Net              360 ml       Nutrition  Orders Placed This Encounter   Procedures   • Diet Order Consistent Carbohydrate     Standing Status:   Standing     Number of Occurrences:   1     Order Specific Question:   Diet:     Answer:   Consistent Carbohydrate [4]     Physical Exam   Constitutional: He is oriented to person, place, and time. He appears well-developed and well-nourished. He is cooperative.   HENT:   Head: Normocephalic and atraumatic.   Eyes: Conjunctivae and EOM are normal.   Cardiovascular: Normal rate and regular rhythm.    Pulmonary/Chest: Effort normal and breath sounds normal. He has no wheezes. He has no rales.   Abdominal: Soft. There is no tenderness. There is no rebound and no guarding.   Musculoskeletal: He  exhibits no edema.   Right foot in clean bandages   Neurological: He is alert and oriented to person, place, and time.   Skin: Skin is warm and dry.   Nursing note and vitals reviewed.      Recent Labs      06/30/18 2120 07/02/18 0201 07/03/18   0210   WBC  9.8  8.7  12.4*   RBC  4.52*  4.51*  4.16*   HEMOGLOBIN  12.9*  13.2*  12.3*   HEMATOCRIT  38.2*  38.3*  35.7*   MCV  84.5  84.9  85.8   MCH  28.5  29.3  29.6   MCHC  33.8  34.5  34.5   RDW  36.3  36.5  37.3   PLATELETCT  308  335  310   MPV  9.4  9.5  9.3     Recent Labs      06/30/18 2120 07/02/18   0201   SODIUM  138  139   POTASSIUM  3.8  3.6   CHLORIDE  102  105   CO2  23  25   GLUCOSE  133*  192*   BUN  7*  6*   CREATININE  0.89  0.85   CALCIUM  8.4*  8.5     Recent Labs      07/02/18   0201   APTT  31.3   INR  1.14*                  Assessment/Plan     * Subacute osteomyelitis of right foot (HCC)   Assessment & Plan    Exposed bone, right metatarsal shaft stump, bleeding noted from the same.   7/2:  vanco, unasyn IV.  ID consultation appreciated, wound culture no growth thus far, BCs negative.    7/6: 6 weeks of ancef per ID, PICC today        Status post amputation of great toe, right (HCC)   Assessment & Plan    Transmetatarsal amputation of right great toe, open wound, bleeding noted from metatarsal stump.  Patient was apparently told to get a wound vac placed after being discharged. limb preservation service consult appreciated.  NPO at [facility] for definite need for OR debridement in a.m.  coags ordered.  Hold dvt prophylaxis in a.m.  Xray neg for OM, but elevated CRP 7.23, sed rate and BCs x 2 ordered .  s/p amputation        Overweight (BMI 25.0-29.9)   Assessment & Plan    Body mass index is 29.16 kg/m².          Diabetes mellitus with coincident hypertension (HCC)   Assessment & Plan    On admission started low dose ACEI for asif-protective effects in DM.    7/2:  Added metoprolol 25mg po bid, hydralazine 25mg po q 4 hours prn Bp >160/90.  7/6:  increased metop to 50mg BID        Type 2 diabetes mellitus with hyperglycemia, without long-term current use of insulin ([facility])   Assessment & Plan    On oral medication regimen as outpatient.    hgba1c 13.6%, insulin basal and prandial dosing while hospitalized started.  7/2:  Blood sugars better controlled in hospital.        Normocytic anemia   Assessment & Plan    Likely due to chronic disease without notable blood loss.  Transfuse if needed for hemoglobin less than 7 gm/dL            Quality-Core Measures   Reviewed items::  EKG reviewed, Labs reviewed, Medications reviewed and Radiology images reviewed  Farrell catheter::  No Farrell  DVT prophylaxis - mechanical:  SCDs

## 2018-07-03 NOTE — PROGRESS NOTES
Infectious Disease Progress Note    Author: Shawnee Chavez M.D. Date & Time of service: 2018  5:50 PM    Chief Complaint:  Diabetic foot infection    Interval History:  2018-MAXIMUM TEMPERATURE 98.6. Underwent surgery today. WBCs 8.7 platelets 335 creatinine 0.85  Labs Reviewed, Medications Reviewed, Radiology Reviewed and Wound Reviewed.    Review of Systems:  Review of Systems   Constitutional: Positive for malaise/fatigue. Negative for fever.   Respiratory: Negative for cough.    Cardiovascular: Negative for chest pain.   Gastrointestinal: Negative for abdominal pain, nausea and vomiting.   Genitourinary: Negative for dysuria.   Musculoskeletal: Positive for myalgias.   Neurological: Negative for speech change.       Hemodynamics:  Temp (24hrs), Av.5 °C (97.7 °F), Min:36.2 °C (97.1 °F), Max:37 °C (98.6 °F)  Temperature: 36.2 °C (97.1 °F)  Pulse  Av.8  Min: 80  Max: 100Heart Rate (Monitored): 84  Blood Pressure: 155/84, NIBP: 158/88       Physical Exam:  Physical Exam   Constitutional: He is oriented to person, place, and time. No distress.   HENT:   Mouth/Throat: No oropharyngeal exudate.   Eyes: No scleral icterus.   Neck: Neck supple.   Cardiovascular: Regular rhythm.    No murmur heard.  Pulmonary/Chest: He has no wheezes. He has no rales.   Abdominal: Soft. There is no tenderness. There is no rebound.   Musculoskeletal:   Right foot bandaged with Hemovac   Neurological: He is alert and oriented to person, place, and time.   Skin:   Abrasions on the lower extremities   Vitals reviewed.      Meds:    Current Facility-Administered Medications:   •  metoprolol  •  hydrALAZINE  •  Notify provider if pain remains uncontrolled **AND** Use the numeric rating scale (NRS-11) on regular floors and Critical-Care Pain Observation Tool (CPOT) on ICUs/Trauma to assess pain **AND** Pulse Ox (Oximetry) **AND** Pharmacy Consult Request **AND** If patient difficult to arouse and/or has respiratory depression,  stop any opiates that are currently infusing and call a Rapid Response. **AND** oxyCODONE immediate-release **AND** oxyCODONE immediate-release **AND** morphine injection  •  vancomycin  •  NS  •  cloNIDine  •  ondansetron  •  ondansetron  •  promethazine  •  promethazine  •  prochlorperazine  •  senna-docusate **AND** polyethylene glycol/lytes **AND** magnesium hydroxide **AND** bisacodyl  •  acetaminophen  •  insulin glargine **AND** insulin lispro **AND** insulin lispro **AND** Accu-Chek ACHS **AND** NOTIFY MD and PharmD **AND** glucose 4 g **AND** dextrose 50%  •  MD ALERT... vancomycin  •  ampicillin-sulbactam (UNASYN) IV    Labs:  Recent Labs      06/30/18 2120 07/02/18   0201   WBC  9.8  8.7   RBC  4.52*  4.51*   HEMOGLOBIN  12.9*  13.2*   HEMATOCRIT  38.2*  38.3*   MCV  84.5  84.9   MCH  28.5  29.3   RDW  36.3  36.5   PLATELETCT  308  335   MPV  9.4  9.5   NEUTSPOLYS  63.60  55.60   LYMPHOCYTES  22.00  29.90   MONOCYTES  10.30  9.00   EOSINOPHILS  2.90  3.90   BASOPHILS  0.70  0.70     Recent Labs      06/30/18 2120 07/02/18   0201   SODIUM  138  139   POTASSIUM  3.8  3.6   CHLORIDE  102  105   CO2  23  25   GLUCOSE  133*  192*   BUN  7*  6*     Recent Labs      06/30/18 2120 07/02/18   0201   ALBUMIN  2.7*   --    TBILIRUBIN  0.7   --    ALKPHOSPHAT  39   --    TOTPROTEIN  6.5   --    ALTSGPT  8   --    ASTSGOT  12   --    CREATININE  0.89  0.85       Imaging:  Dx-foot-complete 3+ Right    Result Date: 6/30/2018 6/30/2018 9:28 PM HISTORY/REASON FOR EXAM:  First toe amputation yesterday TECHNIQUE/EXAM DESCRIPTION:  AP, lateral, and oblique views of the RIGHT foot. COMPARISON:  None. FINDINGS: There is transmetatarsal indication of the first toe. Large soft tissue defect is seen, there appears to be packing material overlying the wound.     1.  No acute traumatic bony injury. 2.  Transmetatarsal amputation of the first toe      Micro:  Results     Procedure Component Value Units Date/Time    GRAM  "STAIN [[id number]] Collected:  07/02/18 1315    Order Status:  Completed Specimen:  Tissue Updated:  07/02/18 1657     Significant Indicator .     Source TISS     Site Metatarsal Margin     Gram Stain Result No organisms seen.    CULTURE TISSUE W/ GRM STAIN [[id number]] Collected:  07/02/18 1315    Order Status:  Completed Specimen:  Other Updated:  07/02/18 1521    ANAEROBIC CULTURE [[id number]] Collected:  07/02/18 1315    Order Status:  Completed Specimen:  Other Updated:  07/02/18 1521    GRAM STAIN [[id number]] Collected:  07/01/18 1320    Order Status:  Completed Specimen:  Wound Updated:  07/02/18 0638     Significant Indicator .     Source WND     Site TOE     Gram Stain Result No organisms seen.    Narrative:       Collected By:[id number] MARJORIE KULKARNI  Right great toe TMA site.    CULTURE WOUND W/ GRAM STAIN [[id number]] Collected:  07/01/18 1320    Order Status:  Completed Specimen:  Wound from Toe Updated:  07/01/18 1330    Narrative:       Collected By:[id number] MARJORIE KULKARNI  Right great toe TMA site.    BLOOD CULTURE x2 [[id number]] Collected:  06/30/18 2120    Order Status:  Completed Specimen:  Blood from Peripheral Updated:  07/01/18 0718     Significant Indicator NEG     Source BLD     Site PERIPHERAL     Blood Culture No Growth    Note: Blood cultures are incubated for 5 days and  are monitored continuously.Positive blood cultures  are called to the RN and reported as soon as  they are identified.      Narrative:       Per Hospital Policy: Only change Specimen Src: to \"Line\" if  specified by physician order.    BLOOD CULTURE x2 [[id number]] Collected:  06/30/18 2154    Order Status:  Completed Specimen:  Blood from Peripheral Updated:  07/01/18 0718     Significant Indicator NEG     Source BLD     Site PERIPHERAL     Blood Culture No Growth    Note: Blood cultures are incubated for 5 days and  are monitored continuously.Positive blood cultures  are called to the RN and reported as soon " "as  they are identified.      Narrative:       Per Hospital Policy: Only change Specimen Src: to \"Line\" if  specified by physician order.    URINALYSIS,CULTURE IF INDICATED [[id number]]  (Abnormal) Collected:  07/01/18 0630    Order Status:  Completed Specimen:  Urine Updated:  07/01/18 0658     Color Yellow     Character Clear     Specific Gravity 1.020     Ph 6.0     Glucose 500 (A) mg/dL      Ketones >=80 (A) mg/dL      Protein 100 (A) mg/dL      Bilirubin Negative     Urobilinogen, Urine 0.2     Nitrite Negative     Leukocyte Esterase Negative     Occult Blood Moderate (A)     Micro Urine Req Microscopic          Assessment:  Active Hospital Problems    Diagnosis   • *Subacute osteomyelitis of right foot ([facility]) [M86.271]   • Status post amputation of great toe, right ([facility]) [Z89.411]   • Normocytic anemia [D64.9]   • Type 2 diabetes mellitus with hyperglycemia, without long-term current use of insulin ([facility]) [E11.65]   • Diabetes mellitus with coincident hypertension ([facility]) [E11.9, I10]   • Overweight (BMI 25.0-29.9) [E66.3]       Plan:  Diabetic foot infection/osteomyelitis  Underwent right transmetatarsal amputation and right foot I&D on 7/2/2018  Follow the OR cultures  Currently on vanco and unasyn  Get the cultures from [facility] in [place]    Uncontrolled diabetes mellitus  His last hemoglobin A1c was 13.6  Control blood sugars for wound healing  Discussed with internal medicine.  "

## 2018-07-04 LAB
ALBUMIN SERPL BCP-MCNC: 2.5 G/DL (ref 3.2–4.9)
BACTERIA TISS AEROBE CULT: ABNORMAL
BACTERIA TISS AEROBE CULT: ABNORMAL
BASOPHILS # BLD AUTO: 0.4 % (ref 0–1.8)
BASOPHILS # BLD: 0.04 K/UL (ref 0–0.12)
BUN SERPL-MCNC: 6 MG/DL (ref 8–22)
CALCIUM SERPL-MCNC: 7.9 MG/DL (ref 8.5–10.5)
CHLORIDE SERPL-SCNC: 104 MMOL/L (ref 96–112)
CO2 SERPL-SCNC: 26 MMOL/L (ref 20–33)
CREAT SERPL-MCNC: 0.81 MG/DL (ref 0.5–1.4)
EOSINOPHIL # BLD AUTO: 0.28 K/UL (ref 0–0.51)
EOSINOPHIL NFR BLD: 3 % (ref 0–6.9)
ERYTHROCYTE [DISTWIDTH] IN BLOOD BY AUTOMATED COUNT: 36.5 FL (ref 35.9–50)
GLUCOSE BLD-MCNC: 112 MG/DL (ref 65–99)
GLUCOSE BLD-MCNC: 127 MG/DL (ref 65–99)
GLUCOSE BLD-MCNC: 155 MG/DL (ref 65–99)
GLUCOSE BLD-MCNC: 171 MG/DL (ref 65–99)
GLUCOSE BLD-MCNC: 63 MG/DL (ref 65–99)
GLUCOSE BLD-MCNC: 70 MG/DL (ref 65–99)
GLUCOSE SERPL-MCNC: 166 MG/DL (ref 65–99)
GRAM STN SPEC: ABNORMAL
HCT VFR BLD AUTO: 32.8 % (ref 42–52)
HGB BLD-MCNC: 11.4 G/DL (ref 14–18)
IMM GRANULOCYTES # BLD AUTO: 0.08 K/UL (ref 0–0.11)
IMM GRANULOCYTES NFR BLD AUTO: 0.9 % (ref 0–0.9)
LYMPHOCYTES # BLD AUTO: 2.05 K/UL (ref 1–4.8)
LYMPHOCYTES NFR BLD: 22.2 % (ref 22–41)
MCH RBC QN AUTO: 29.5 PG (ref 27–33)
MCHC RBC AUTO-ENTMCNC: 34.8 G/DL (ref 33.7–35.3)
MCV RBC AUTO: 84.8 FL (ref 81.4–97.8)
MONOCYTES # BLD AUTO: 0.93 K/UL (ref 0–0.85)
MONOCYTES NFR BLD AUTO: 10.1 % (ref 0–13.4)
NEUTROPHILS # BLD AUTO: 5.85 K/UL (ref 1.82–7.42)
NEUTROPHILS NFR BLD: 63.4 % (ref 44–72)
NRBC # BLD AUTO: 0 K/UL
NRBC BLD-RTO: 0 /100 WBC
PHOSPHATE SERPL-MCNC: 2 MG/DL (ref 2.5–4.5)
PLATELET # BLD AUTO: 294 K/UL (ref 164–446)
PMV BLD AUTO: 9.3 FL (ref 9–12.9)
POTASSIUM SERPL-SCNC: 3.5 MMOL/L (ref 3.6–5.5)
RBC # BLD AUTO: 3.87 M/UL (ref 4.7–6.1)
SIGNIFICANT IND 70042: ABNORMAL
SITE SITE: ABNORMAL
SODIUM SERPL-SCNC: 137 MMOL/L (ref 135–145)
SOURCE SOURCE: ABNORMAL
WBC # BLD AUTO: 9.2 K/UL (ref 4.8–10.8)

## 2018-07-04 PROCEDURE — 700111 HCHG RX REV CODE 636 W/ 250 OVERRIDE (IP): Performed by: NURSE PRACTITIONER

## 2018-07-04 PROCEDURE — 700105 HCHG RX REV CODE 258

## 2018-07-04 PROCEDURE — G8979 MOBILITY GOAL STATUS: HCPCS | Mod: CI

## 2018-07-04 PROCEDURE — 700102 HCHG RX REV CODE 250 W/ 637 OVERRIDE(OP): Performed by: INTERNAL MEDICINE

## 2018-07-04 PROCEDURE — A9270 NON-COVERED ITEM OR SERVICE: HCPCS | Performed by: HOSPITALIST

## 2018-07-04 PROCEDURE — 770006 HCHG ROOM/CARE - MED/SURG/GYN SEMI*

## 2018-07-04 PROCEDURE — 85025 COMPLETE CBC W/AUTO DIFF WBC: CPT

## 2018-07-04 PROCEDURE — A9270 NON-COVERED ITEM OR SERVICE: HCPCS | Performed by: INTERNAL MEDICINE

## 2018-07-04 PROCEDURE — 80069 RENAL FUNCTION PANEL: CPT

## 2018-07-04 PROCEDURE — 82962 GLUCOSE BLOOD TEST: CPT | Mod: 91

## 2018-07-04 PROCEDURE — 99232 SBSQ HOSP IP/OBS MODERATE 35: CPT | Performed by: INTERNAL MEDICINE

## 2018-07-04 PROCEDURE — 700102 HCHG RX REV CODE 250 W/ 637 OVERRIDE(OP): Performed by: HOSPITALIST

## 2018-07-04 PROCEDURE — 97162 PT EVAL MOD COMPLEX 30 MIN: CPT

## 2018-07-04 PROCEDURE — G8978 MOBILITY CURRENT STATUS: HCPCS | Mod: CJ

## 2018-07-04 RX ORDER — AMLODIPINE BESYLATE 10 MG/1
10 TABLET ORAL
Status: DISCONTINUED | OUTPATIENT
Start: 2018-07-04 | End: 2018-07-05

## 2018-07-04 RX ORDER — GLYBURIDE 5 MG/1
5 TABLET ORAL
Status: DISCONTINUED | OUTPATIENT
Start: 2018-07-04 | End: 2018-07-06 | Stop reason: HOSPADM

## 2018-07-04 RX ORDER — SODIUM CHLORIDE 9 MG/ML
INJECTION, SOLUTION INTRAVENOUS
Status: COMPLETED
Start: 2018-07-04 | End: 2018-07-04

## 2018-07-04 RX ORDER — LISINOPRIL 10 MG/1
10 TABLET ORAL
Status: DISCONTINUED | OUTPATIENT
Start: 2018-07-04 | End: 2018-07-05

## 2018-07-04 RX ADMIN — CEFAZOLIN SODIUM 2 G: 2 INJECTION, SOLUTION INTRAVENOUS at 06:35

## 2018-07-04 RX ADMIN — LISINOPRIL 10 MG: 10 TABLET ORAL at 08:12

## 2018-07-04 RX ADMIN — SODIUM CHLORIDE 500 ML: 9 INJECTION, SOLUTION INTRAVENOUS at 01:19

## 2018-07-04 RX ADMIN — INSULIN LISPRO 4 UNITS: 100 INJECTION, SOLUTION INTRAVENOUS; SUBCUTANEOUS at 11:21

## 2018-07-04 RX ADMIN — INSULIN LISPRO 1 UNITS: 100 INJECTION, SOLUTION INTRAVENOUS; SUBCUTANEOUS at 06:43

## 2018-07-04 RX ADMIN — AMLODIPINE BESYLATE 10 MG: 10 TABLET ORAL at 14:36

## 2018-07-04 RX ADMIN — INSULIN GLARGINE 10 UNITS: 100 INJECTION, SOLUTION SUBCUTANEOUS at 21:33

## 2018-07-04 RX ADMIN — OXYCODONE HYDROCHLORIDE 5 MG: 5 TABLET ORAL at 06:37

## 2018-07-04 RX ADMIN — INSULIN LISPRO 1 UNITS: 100 INJECTION, SOLUTION INTRAVENOUS; SUBCUTANEOUS at 11:26

## 2018-07-04 RX ADMIN — CEFAZOLIN SODIUM 2 G: 2 INJECTION, SOLUTION INTRAVENOUS at 14:36

## 2018-07-04 RX ADMIN — GLYBURIDE 5 MG: 5 TABLET ORAL at 08:12

## 2018-07-04 RX ADMIN — OXYCODONE HYDROCHLORIDE 5 MG: 5 TABLET ORAL at 01:20

## 2018-07-04 RX ADMIN — CEFAZOLIN SODIUM 2 G: 2 INJECTION, SOLUTION INTRAVENOUS at 21:36

## 2018-07-04 RX ADMIN — INSULIN LISPRO 4 UNITS: 100 INJECTION, SOLUTION INTRAVENOUS; SUBCUTANEOUS at 06:42

## 2018-07-04 ASSESSMENT — ENCOUNTER SYMPTOMS
ABDOMINAL PAIN: 0
ROS GI COMMENTS: LAST BM 5 DAYS AGO
MYALGIAS: 1
NAUSEA: 0
CONSTIPATION: 1
PALPITATIONS: 0
CHILLS: 0
SENSORY CHANGE: 0
FEVER: 0
DIARRHEA: 0
VOMITING: 0
SHORTNESS OF BREATH: 0
COUGH: 0
HEADACHES: 0

## 2018-07-04 ASSESSMENT — COGNITIVE AND FUNCTIONAL STATUS - GENERAL
MOBILITY SCORE: 20
STANDING UP FROM CHAIR USING ARMS: A LITTLE
SUGGESTED CMS G CODE MODIFIER MOBILITY: CJ
WALKING IN HOSPITAL ROOM: A LITTLE
MOVING FROM LYING ON BACK TO SITTING ON SIDE OF FLAT BED: A LITTLE
CLIMB 3 TO 5 STEPS WITH RAILING: A LITTLE

## 2018-07-04 ASSESSMENT — GAIT ASSESSMENTS
ASSISTIVE DEVICE: FRONT WHEEL WALKER
DISTANCE (FEET): 75
GAIT LEVEL OF ASSIST: CONTACT GUARD ASSIST

## 2018-07-04 ASSESSMENT — PAIN SCALES - GENERAL
PAINLEVEL_OUTOF10: 0
PAINLEVEL_OUTOF10: 2
PAINLEVEL_OUTOF10: 5
PAINLEVEL_OUTOF10: 4
PAINLEVEL_OUTOF10: 4

## 2018-07-04 ASSESSMENT — PATIENT HEALTH QUESTIONNAIRE - PHQ9
2. FEELING DOWN, DEPRESSED, IRRITABLE, OR HOPELESS: NOT AT ALL
SUM OF ALL RESPONSES TO PHQ9 QUESTIONS 1 AND 2: 0
1. LITTLE INTEREST OR PLEASURE IN DOING THINGS: NOT AT ALL

## 2018-07-04 NOTE — DISCHARGE PLANNING
Received Choice form at [phone]  Agency/Facility Name: [facility]  Referral sent per Choice form @ [phone]

## 2018-07-04 NOTE — PROGRESS NOTES
Infectious Disease Progress Note    Author: ELEAZAR Limon Date & Time of service: 2018  10:59 AM    Chief Complaint:  Diabetic foot infection    Interval History:  2018-MAXIMUM TEMPERATURE 98.6. Underwent surgery today. WBCs 8.7 platelets 335 creatinine 0.85  7/3- AF, WBC 12.4, mild pain to RLE being controlled with pain meds, currently denies any pain, no issue with abx, agreeable to PICC.   -AF, WBC 9.2, tolerating abx, currently denies pain to RLE being controlled with pain meds can get up to 5/10, wants to go to SNF for IV antibiotic treatment.  Labs Reviewed, Medications Reviewed, Radiology Reviewed and Wound Reviewed.    Review of Systems:  Review of Systems   Constitutional: Negative for chills and fever.   HENT: Negative for congestion.    Respiratory: Negative for cough and shortness of breath.    Cardiovascular: Negative for chest pain and palpitations.   Gastrointestinal: Positive for constipation. Negative for abdominal pain, diarrhea, nausea and vomiting.        Last BM 5 days ago   Genitourinary: Negative for dysuria.   Musculoskeletal: Positive for joint pain and myalgias.        Minimal to RLE   Skin: Negative for rash.   Neurological: Negative for sensory change and headaches.       Hemodynamics:  Temp (24hrs), Av.9 °C (98.4 °F), Min:36.6 °C (97.8 °F), Max:37 °C (98.6 °F)  Temperature: 37 °C (98.6 °F)  Pulse  Av.1  Min: 77  Max: 100   Blood Pressure: (!) 185/95 (lisinopril given)       Physical Exam:  Physical Exam   Constitutional: He is oriented to person, place, and time. He appears well-developed and well-nourished. No distress.   HENT:   Head: Normocephalic and atraumatic.   Mouth/Throat: No oropharyngeal exudate.   Eyes: Conjunctivae and EOM are normal. Pupils are equal, round, and reactive to light. No scleral icterus.   Neck: Normal range of motion.   Cardiovascular: Normal rate, regular rhythm, normal heart sounds and intact distal pulses.    No murmur  heard.  Pulmonary/Chest: Effort normal. No respiratory distress. He has no wheezes. He has no rales.   Abdominal: Soft. He exhibits no distension. There is no rebound and no guarding.   Musculoskeletal: He exhibits tenderness.   RLE- original surgical bandage remains in place, hemovac discontinued, able to wiggle toes.      RUE PICC- CDI, non tender, no erythema.     Neurological: He is alert and oriented to person, place, and time.   Skin: Skin is warm and dry. No erythema.   Abrasions to BLE.   Nursing note and vitals reviewed.      Meds:    Current Facility-Administered Medications:   •  glyBURIDE  •  lisinopril  •  lidocaine  •  ceFAZolin  •  hydrALAZINE  •  Notify provider if pain remains uncontrolled **AND** Use the numeric rating scale (NRS-11) on regular floors and Critical-Care Pain Observation Tool (CPOT) on ICUs/Trauma to assess pain **AND** Pulse Ox (Oximetry) **AND** Pharmacy Consult Request **AND** If patient difficult to arouse and/or has respiratory depression, stop any opiates that are currently infusing and call a Rapid Response. **AND** oxyCODONE immediate-release **AND** oxyCODONE immediate-release **AND** morphine injection  •  cloNIDine  •  ondansetron  •  ondansetron  •  promethazine  •  promethazine  •  prochlorperazine  •  senna-docusate **AND** polyethylene glycol/lytes **AND** magnesium hydroxide **AND** bisacodyl  •  acetaminophen  •  insulin glargine **AND** insulin lispro **AND** insulin lispro **AND** Accu-Chek ACHS **AND** NOTIFY MD and PharmD **AND** glucose 4 g **AND** dextrose 50%    Labs:  Recent Labs      07/02/18   0201  07/03/18   0210  07/04/18   0438   WBC  8.7  12.4*  9.2   RBC  4.51*  4.16*  3.87*   HEMOGLOBIN  13.2*  12.3*  11.4*   HEMATOCRIT  38.3*  35.7*  32.8*   MCV  84.9  85.8  84.8   MCH  29.3  29.6  29.5   RDW  36.5  37.3  36.5   PLATELETCT  335  310  294   MPV  9.5  9.3  9.3   NEUTSPOLYS  55.60  76.20*  63.40   LYMPHOCYTES  29.90  14.90*  22.20   MONOCYTES  9.00   7.00  10.10   EOSINOPHILS  3.90  0.40  3.00   BASOPHILS  0.70  0.40  0.40     Recent Labs      07/02/18   0201  07/04/18   0438   SODIUM  139  137   POTASSIUM  3.6  3.5*   CHLORIDE  105  104   CO2  25  26   GLUCOSE  192*  166*   BUN  6*  6*     Recent Labs      07/02/18   0201  07/04/18   0438   ALBUMIN   --   2.5*   CREATININE  0.85  0.81       Imaging:  IR-PICC LINE PLACEMENT   7/3/2018   HISTORY/REASON FOR EXAM:   PICC placement.      TECHNIQUE/EXAM DESCRIPTION AND NUMBER OF VIEWS:   PICC line insertion with ultrasound guidance.  The procedure was performed using maximal sterile barrier technique including sterile gown, mask, cap, and donning of sterile gloves following appropriate hand hygiene and/or sterile scrub. Patient skin site was prepped with 2% Chlorhexidine solution.     FINDINGS:  PICC line insertion with Ultrasound Guidance was performed by qualified nursing staff without the assistance of a Radiologist. PICC positioning appropriateness confirmed by 3CG technology; chest xray only needed in the instance 3CG unable to confirm placement.      IMPRESSION:              Ultrasound-guided PICC placement performed by qualified nursing staff as above.       Micro:  Results     Procedure Component Value Units Date/Time    CULTURE TISSUE W/ GRM STAIN [[id number]] Collected:  07/02/18 1315    Order Status:  Completed Specimen:  Tissue Updated:  07/03/18 1712     Significant Indicator NEG     Source TISS     Site Metatarsal Margin     Tissue Culture No growth at 24 hours.     Gram Stain Result No organisms seen.    ANAEROBIC CULTURE [[id number]] Collected:  07/02/18 1315    Order Status:  Completed Specimen:  Tissue Updated:  07/03/18 1712     Significant Indicator NEG     Source TISS     Site Metatarsal Margin     Anaerobic Culture, Culture Res Culture in progress.    CULTURE WOUND W/ GRAM STAIN [[id number]]  (Abnormal) Collected:  07/01/18 1320    Order Status:  Completed Specimen:  Wound from Toe Updated:   "07/03/18 1231     Significant Indicator POS (POS)     Source WND     Site TOE     Culture Result Wound -- (A)     Gram Stain Result No organisms seen.     Culture Result Wound Yeast  Light growth   (A)    Narrative:       Collected By:[id number] MARJORIE BRYAN.  Right great toe TMA site.    GRAM STAIN [[id number]] Collected:  07/02/18 1315    Order Status:  Completed Specimen:  Tissue Updated:  07/02/18 1657     Significant Indicator .     Source TISS     Site Metatarsal Margin     Gram Stain Result No organisms seen.    GRAM STAIN [[id number]] Collected:  07/01/18 1320    Order Status:  Completed Specimen:  Wound Updated:  07/02/18 0638     Significant Indicator .     Source WND     Site TOE     Gram Stain Result No organisms seen.    Narrative:       Collected By:[id number] MARJORIE BRYAN.  Right great toe TMA site.    BLOOD CULTURE x2 [[id number]] Collected:  06/30/18 2120    Order Status:  Completed Specimen:  Blood from Peripheral Updated:  07/01/18 0718     Significant Indicator NEG     Source BLD     Site PERIPHERAL     Blood Culture No Growth    Note: Blood cultures are incubated for 5 days and  are monitored continuously.Positive blood cultures  are called to the RN and reported as soon as  they are identified.      Narrative:       Per Hospital Policy: Only change Specimen Src: to \"Line\" if  specified by physician order.    BLOOD CULTURE x2 [[id number]] Collected:  06/30/18 2154    Order Status:  Completed Specimen:  Blood from Peripheral Updated:  07/01/18 0718     Significant Indicator NEG     Source BLD     Site PERIPHERAL     Blood Culture No Growth    Note: Blood cultures are incubated for 5 days and  are monitored continuously.Positive blood cultures  are called to the RN and reported as soon as  they are identified.      Narrative:       Per Hospital Policy: Only change Specimen Src: to \"Line\" if  specified by physician order.    URINALYSIS,CULTURE IF INDICATED [[id number]]  (Abnormal) Collected: "  07/01/18 0630    Order Status:  Completed Specimen:  Urine Updated:  07/01/18 0658     Color Yellow     Character Clear     Specific Gravity 1.020     Ph 6.0     Glucose 500 (A) mg/dL      Ketones >=80 (A) mg/dL      Protein 100 (A) mg/dL      Bilirubin Negative     Urobilinogen, Urine 0.2     Nitrite Negative     Leukocyte Esterase Negative     Occult Blood Moderate (A)     Micro Urine Req Microscopic          Assessment:  Active Hospital Problems    Diagnosis   • *Subacute osteomyelitis of right foot ([facility]) [M86.271]   • Status post amputation of great toe, right ([facility]) [Z89.411]   • Normocytic anemia [D64.9]   • Type 2 diabetes mellitus with hyperglycemia, without long-term current use of insulin ([facility]) [E11.65]   • Diabetes mellitus with coincident hypertension ([facility]) [E11.9, I10]   • Overweight (BMI 25.0-29.9) [E66.3]       Plan:  Diabetic foot infection/osteomyelitis  Underwent Right TMA with I&D and GSR on 7/2 with Dr. Bernardo  OR cx pending  Cx from [facility] on 6/29 +MSSA (see Media tab)  D/C'd IV Vanco and Unasyn  Continue IV Ancef  Plan for 6 weeks IV abx d/t R I&D to multiple compartments  Estimated end date 8/13/18  Pt would prefer to go to SNF for treatment    Uncontrolled diabetes mellitus  HgA1C 13.6% on 6/30/18  Control blood sugars for wound healing and control infection      Discussed with QUANG Merrill.  Will look into SNF placement.

## 2018-07-04 NOTE — PROGRESS NOTES
[facility] Hospitalist Progress Note    Date of Service: 2018    Chief Complaint  47 y.o. male admitted 2018 with DM who injured his right great toe who developed osteomyelitis s/p amputation 18.     Interval Problem Update  Culture with MSSA, on ancef per ID.   Restarted home glyburide  Lisinopril for DM HTN    Consultants/Specialty  ID  Ortho    Disposition  Wound care 2 weeks  NWB to RLE  PICC in place        Review of Systems   Constitutional: Negative for fever.   Respiratory: Negative for shortness of breath.    Cardiovascular: Negative for chest pain.   Gastrointestinal: Negative for abdominal pain.   Musculoskeletal: Positive for joint pain.   Skin: Negative for itching.   Neurological: Negative for headaches.      Physical Exam  Laboratory/Imaging   Hemodynamics  Temp (24hrs), Av.9 °C (98.5 °F), Min:36.8 °C (98.3 °F), Max:37 °C (98.6 °F)   Temperature: 37 °C (98.6 °F)  Pulse  Av.1  Min: 77  Max: 100    Blood Pressure: (!) 185/95 (lisinopril given)      Respiratory      Respiration: 17, Pulse Oximetry: 93 %             Fluids    Intake/Output Summary (Last 24 hours) at 18 1400  Last data filed at 18 0800   Gross per 24 hour   Intake              580 ml   Output               80 ml   Net              500 ml       Nutrition  Orders Placed This Encounter   Procedures   • Diet Order Consistent Carbohydrate     Standing Status:   Standing     Number of Occurrences:   1     Order Specific Question:   Diet:     Answer:   Consistent Carbohydrate [4]     Physical Exam   Constitutional: He is oriented to person, place, and time. He appears well-developed and well-nourished. He is cooperative.   HENT:   Head: Normocephalic and atraumatic.   Eyes: Conjunctivae and EOM are normal. Right eye exhibits no discharge. Left eye exhibits no discharge.   Cardiovascular: Normal rate and regular rhythm.    Pulmonary/Chest: Effort normal and breath sounds normal. He has no wheezes. He has no rales.    Abdominal: Soft. There is no tenderness. There is no rebound and no guarding.   Musculoskeletal: He exhibits no edema.   Right foot in clean bandages   Neurological: He is alert and oriented to person, place, and time.   Skin: Skin is warm and dry.   Nursing note and vitals reviewed.      Recent Labs      07/02/18   0201  07/03/18   0210  07/04/18   0438   WBC  8.7  12.4*  9.2   RBC  4.51*  4.16*  3.87*   HEMOGLOBIN  13.2*  12.3*  11.4*   HEMATOCRIT  38.3*  35.7*  32.8*   MCV  84.9  85.8  84.8   MCH  29.3  29.6  29.5   MCHC  34.5  34.5  34.8   RDW  36.5  37.3  36.5   PLATELETCT  335  310  294   MPV  9.5  9.3  9.3     Recent Labs      07/02/18   0201  07/04/18   0438   SODIUM  139  137   POTASSIUM  3.6  3.5*   CHLORIDE  105  104   CO2  25  26   GLUCOSE  192*  166*   BUN  6*  6*   CREATININE  0.85  0.81   CALCIUM  8.5  7.9*     Recent Labs      07/02/18   0201   APTT  31.3   INR  1.14*                  Assessment/Plan     * Subacute osteomyelitis of right foot (HCC)   Assessment & Plan    Exposed bone, right metatarsal shaft stump, bleeding noted from the same.   7/2:  vanco, unasyn IV.  ID consultation appreciated, wound culture with MSSA, BCs negative.    7/6: 6 weeks (end 8/13) of ancef per ID, PICC today        Status post amputation of great toe, right (HCC)   Assessment & Plan    Transmetatarsal amputation of right great toe, open wound, bleeding noted from metatarsal stump.  Patient was apparently told to get a wound vac placed after being discharged. limb preservation service consult appreciated.  NPO at MN for definite need for OR debridement in a.m.  coags ordered.  Hold dvt prophylaxis in a.m.  Xray neg for OM, but elevated CRP 7.23, sed rate and BCs x 2 ordered .  s/p amputation        Overweight (BMI 25.0-29.9)   Assessment & Plan    Body mass index is 29.16 kg/m².          Diabetes mellitus with coincident hypertension (HCC)   Assessment & Plan    On admission started low dose ACEI for asif-protective  effects in DM.    7/2:  Added metoprolol 25mg po bid, hydralazine 25mg po q 4 hours prn Bp >160/90.  7/4: Changed metop to lisinopril given his diabetes, added norvasc        Type 2 diabetes mellitus with hyperglycemia, without long-term current use of insulin ([facility])   Assessment & Plan    On oral medication regimen as outpatient.    hgba1c 13.6%  Glucose controlled on lantus and glyburide        Normocytic anemia   Assessment & Plan    Likely due to chronic disease without notable blood loss.  Transfuse if needed for hemoglobin less than 7 gm/dL            Quality-Core Measures   Reviewed items::  EKG reviewed, Labs reviewed, Medications reviewed and Radiology images reviewed  Farrell catheter::  No Farrell  DVT prophylaxis - mechanical:  SCDs

## 2018-07-04 NOTE — CARE PLAN
Problem: Venous Thromboembolism (VTW)/Deep Vein Thrombosis (DVT) Prevention:  Goal: Patient will participate in Venous Thrombosis (VTE)/Deep Vein Thrombosis (DVT)Prevention Measures  Pt has SCD on LLE and plugged into compression device.  Participating in DVT/VT prevention.  Cont with POC.    Problem: Knowledge Deficit  Goal: Knowledge of disease process/condition, treatment plan, diagnostic tests, and medications will improve  Pt questioning if oral antidiabetic medication (glypuride 5mg) is sufficiently covering blood sugar.  Lunch ; received 4 scheduled units insulin and 1 unit sliding scale coverage.  Pt states he would like to follow up out patient with better blood sugar control.  Cont with POC.

## 2018-07-04 NOTE — DISCHARGE PLANNING
Anticipated Discharge Disposition: SNF vs home infusion and HH for wound care    Action: LSW met with patient bedside for SNF choice. Patient reports to send referrals to all facilities and see who can take patients insurance. LSW faxed choice form to [facility] for blanket referral. If patient can not go to SNF due to any insurance issues he would like home infusion with HH for wound care.     Patient can return home with spouse after SNF    Barriers to Discharge: None    Plan: f/u for SNF acceptance and denials.

## 2018-07-04 NOTE — CARE PLAN
Problem: Pain Management  Goal: Pain level will decrease to patient's comfort goal  Patient medicated for pain rated from 5 to 7/10.  Patient able to rest comfortably.

## 2018-07-04 NOTE — PROGRESS NOTES
Diabetes education: Met with patient this afternoon. Please see consult note. Discussed goals for blood sugars and what effect them.  Plan: Carrie HENAO CDE will follow up with patient for insulin instructions on Thursday. Please call [phone] if needs change.

## 2018-07-04 NOTE — CARE PLAN
Problem: Safety  Goal: Will remain free from falls  Patient is compliant with use of call light and waits for staff assistance. Frequent rounding.

## 2018-07-04 NOTE — CONSULTS
Diabetes education: Met with pt who has hx of diabetes on oral agents at home. Pt has done finger sticks before but currently does not have a meter.  Pt is currently on Lantus 10 units HS with Humalog 4 units ac and sliding scale ac and hs. Current blood sugars are 149, 195 (1 unit), and 191 ( 1 unit). Hg a1c was 13.7%.  Pt was given and reviewed a One touch verio flex meter and delica lancet device. Pt states he will be going to SNF after here and recommended he send meter home.  Asked patient to practice given his own insulin with nursing.  Plan: Carrie HENAO CDE will follow up with patient for insulin instructions on Thursday. Please call [phone] if needs change.

## 2018-07-05 ENCOUNTER — HOME HEALTH ADMISSION (OUTPATIENT)
Dept: HOME HEALTH SERVICES | Facility: HOME HEALTHCARE | Age: 48
End: 2018-07-05
Payer: COMMERCIAL

## 2018-07-05 PROBLEM — A49.01 MSSA (METHICILLIN SUSCEPTIBLE STAPHYLOCOCCUS AUREUS) INFECTION: Status: ACTIVE | Noted: 2018-07-05

## 2018-07-05 PROBLEM — Z89.431 S/P TRANSMETATARSAL AMPUTATION OF FOOT, RIGHT (HCC): Status: ACTIVE | Noted: 2018-07-05

## 2018-07-05 LAB
ANION GAP SERPL CALC-SCNC: 8 MMOL/L (ref 0–11.9)
BACTERIA BLD CULT: NORMAL
BACTERIA SPEC ANAEROBE CULT: NORMAL
BASOPHILS # BLD AUTO: 0.8 % (ref 0–1.8)
BASOPHILS # BLD: 0.08 K/UL (ref 0–0.12)
BUN SERPL-MCNC: 5 MG/DL (ref 8–22)
CALCIUM SERPL-MCNC: 8.5 MG/DL (ref 8.5–10.5)
CHLORIDE SERPL-SCNC: 104 MMOL/L (ref 96–112)
CO2 SERPL-SCNC: 27 MMOL/L (ref 20–33)
CREAT SERPL-MCNC: 0.72 MG/DL (ref 0.5–1.4)
EOSINOPHIL # BLD AUTO: 0.19 K/UL (ref 0–0.51)
EOSINOPHIL NFR BLD: 1.9 % (ref 0–6.9)
ERYTHROCYTE [DISTWIDTH] IN BLOOD BY AUTOMATED COUNT: 36.4 FL (ref 35.9–50)
GLUCOSE BLD-MCNC: 115 MG/DL (ref 65–99)
GLUCOSE BLD-MCNC: 130 MG/DL (ref 65–99)
GLUCOSE BLD-MCNC: 134 MG/DL (ref 65–99)
GLUCOSE BLD-MCNC: 156 MG/DL (ref 65–99)
GLUCOSE SERPL-MCNC: 133 MG/DL (ref 65–99)
HCT VFR BLD AUTO: 35.2 % (ref 42–52)
HGB BLD-MCNC: 12 G/DL (ref 14–18)
IMM GRANULOCYTES # BLD AUTO: 0.07 K/UL (ref 0–0.11)
IMM GRANULOCYTES NFR BLD AUTO: 0.7 % (ref 0–0.9)
LYMPHOCYTES # BLD AUTO: 2.07 K/UL (ref 1–4.8)
LYMPHOCYTES NFR BLD: 20.7 % (ref 22–41)
MCH RBC QN AUTO: 28.8 PG (ref 27–33)
MCHC RBC AUTO-ENTMCNC: 34.1 G/DL (ref 33.7–35.3)
MCV RBC AUTO: 84.4 FL (ref 81.4–97.8)
MONOCYTES # BLD AUTO: 0.99 K/UL (ref 0–0.85)
MONOCYTES NFR BLD AUTO: 9.9 % (ref 0–13.4)
NEUTROPHILS # BLD AUTO: 6.6 K/UL (ref 1.82–7.42)
NEUTROPHILS NFR BLD: 66 % (ref 44–72)
NRBC # BLD AUTO: 0 K/UL
NRBC BLD-RTO: 0 /100 WBC
PLATELET # BLD AUTO: 320 K/UL (ref 164–446)
PMV BLD AUTO: 9.1 FL (ref 9–12.9)
POTASSIUM SERPL-SCNC: 3.3 MMOL/L (ref 3.6–5.5)
RBC # BLD AUTO: 4.17 M/UL (ref 4.7–6.1)
SIGNIFICANT IND 70042: NORMAL
SIGNIFICANT IND 70042: NORMAL
SITE SITE: NORMAL
SITE SITE: NORMAL
SODIUM SERPL-SCNC: 139 MMOL/L (ref 135–145)
SOURCE SOURCE: NORMAL
SOURCE SOURCE: NORMAL
WBC # BLD AUTO: 10 K/UL (ref 4.8–10.8)

## 2018-07-05 PROCEDURE — 99232 SBSQ HOSP IP/OBS MODERATE 35: CPT | Performed by: INTERNAL MEDICINE

## 2018-07-05 PROCEDURE — G8989 SELF CARE D/C STATUS: HCPCS | Mod: CI

## 2018-07-05 PROCEDURE — 700102 HCHG RX REV CODE 250 W/ 637 OVERRIDE(OP): Performed by: HOSPITALIST

## 2018-07-05 PROCEDURE — A9270 NON-COVERED ITEM OR SERVICE: HCPCS | Performed by: INTERNAL MEDICINE

## 2018-07-05 PROCEDURE — 97110 THERAPEUTIC EXERCISES: CPT

## 2018-07-05 PROCEDURE — G8988 SELF CARE GOAL STATUS: HCPCS | Mod: CI

## 2018-07-05 PROCEDURE — 85025 COMPLETE CBC W/AUTO DIFF WBC: CPT

## 2018-07-05 PROCEDURE — 97165 OT EVAL LOW COMPLEX 30 MIN: CPT

## 2018-07-05 PROCEDURE — 770006 HCHG ROOM/CARE - MED/SURG/GYN SEMI*

## 2018-07-05 PROCEDURE — 700111 HCHG RX REV CODE 636 W/ 250 OVERRIDE (IP): Performed by: NURSE PRACTITIONER

## 2018-07-05 PROCEDURE — 97530 THERAPEUTIC ACTIVITIES: CPT

## 2018-07-05 PROCEDURE — A6212 FOAM DRG <=16 SQ IN W/BORDER: HCPCS | Performed by: INTERNAL MEDICINE

## 2018-07-05 PROCEDURE — 97116 GAIT TRAINING THERAPY: CPT

## 2018-07-05 PROCEDURE — 700102 HCHG RX REV CODE 250 W/ 637 OVERRIDE(OP): Performed by: INTERNAL MEDICINE

## 2018-07-05 PROCEDURE — 82962 GLUCOSE BLOOD TEST: CPT

## 2018-07-05 PROCEDURE — G8987 SELF CARE CURRENT STATUS: HCPCS | Mod: CI

## 2018-07-05 PROCEDURE — 80048 BASIC METABOLIC PNL TOTAL CA: CPT

## 2018-07-05 PROCEDURE — 99233 SBSQ HOSP IP/OBS HIGH 50: CPT | Performed by: INTERNAL MEDICINE

## 2018-07-05 RX ORDER — LISINOPRIL 20 MG/1
20 TABLET ORAL
Status: DISCONTINUED | OUTPATIENT
Start: 2018-07-06 | End: 2018-07-06 | Stop reason: HOSPADM

## 2018-07-05 RX ORDER — POTASSIUM CHLORIDE 20 MEQ/1
40 TABLET, EXTENDED RELEASE ORAL 2 TIMES DAILY
Status: COMPLETED | OUTPATIENT
Start: 2018-07-05 | End: 2018-07-05

## 2018-07-05 RX ADMIN — GLYBURIDE 5 MG: 5 TABLET ORAL at 07:42

## 2018-07-05 RX ADMIN — CEFAZOLIN SODIUM 2 G: 2 INJECTION, SOLUTION INTRAVENOUS at 14:40

## 2018-07-05 RX ADMIN — CEFAZOLIN SODIUM 2 G: 2 INJECTION, SOLUTION INTRAVENOUS at 05:17

## 2018-07-05 RX ADMIN — CEFAZOLIN SODIUM 2 G: 2 INJECTION, SOLUTION INTRAVENOUS at 20:58

## 2018-07-05 RX ADMIN — INSULIN LISPRO 4 UNITS: 100 INJECTION, SOLUTION INTRAVENOUS; SUBCUTANEOUS at 06:13

## 2018-07-05 RX ADMIN — POTASSIUM CHLORIDE 40 MEQ: 1500 TABLET, EXTENDED RELEASE ORAL at 08:05

## 2018-07-05 RX ADMIN — AMLODIPINE BESYLATE 10 MG: 10 TABLET ORAL at 08:05

## 2018-07-05 RX ADMIN — INSULIN LISPRO 1 UNITS: 100 INJECTION, SOLUTION INTRAVENOUS; SUBCUTANEOUS at 21:03

## 2018-07-05 RX ADMIN — INSULIN GLARGINE 10 UNITS: 100 INJECTION, SOLUTION SUBCUTANEOUS at 21:04

## 2018-07-05 RX ADMIN — POTASSIUM CHLORIDE 40 MEQ: 1500 TABLET, EXTENDED RELEASE ORAL at 20:57

## 2018-07-05 RX ADMIN — LISINOPRIL 10 MG: 10 TABLET ORAL at 08:05

## 2018-07-05 ASSESSMENT — COGNITIVE AND FUNCTIONAL STATUS - GENERAL
DRESSING REGULAR LOWER BODY CLOTHING: A LITTLE
WALKING IN HOSPITAL ROOM: A LITTLE
CLIMB 3 TO 5 STEPS WITH RAILING: A LITTLE
SUGGESTED CMS G CODE MODIFIER MOBILITY: CJ
TOILETING: A LITTLE
SUGGESTED CMS G CODE MODIFIER DAILY ACTIVITY: CJ
MOBILITY SCORE: 20
MOVING FROM LYING ON BACK TO SITTING ON SIDE OF FLAT BED: A LITTLE
HELP NEEDED FOR BATHING: A LITTLE
DAILY ACTIVITIY SCORE: 21
STANDING UP FROM CHAIR USING ARMS: A LITTLE

## 2018-07-05 ASSESSMENT — ENCOUNTER SYMPTOMS
SORE THROAT: 0
ROS GI COMMENTS: LAST BM 6 DAYS AGO
DIARRHEA: 0
SENSORY CHANGE: 0
NAUSEA: 0
BLURRED VISION: 0
VOMITING: 0
MYALGIAS: 1
CONSTIPATION: 1
CHILLS: 0
ABDOMINAL PAIN: 0
PALPITATIONS: 0
FEVER: 0
SHORTNESS OF BREATH: 0
HEADACHES: 0

## 2018-07-05 ASSESSMENT — GAIT ASSESSMENTS
ASSISTIVE DEVICE: FRONT WHEEL WALKER
GAIT LEVEL OF ASSIST: STAND BY ASSIST
DISTANCE (FEET): 50
DEVIATION: STEP TO;BRADYKINETIC

## 2018-07-05 ASSESSMENT — ACTIVITIES OF DAILY LIVING (ADL): TOILETING: INDEPENDENT

## 2018-07-05 ASSESSMENT — PAIN SCALES - GENERAL
PAINLEVEL_OUTOF10: 0
PAINLEVEL_OUTOF10: 0

## 2018-07-05 NOTE — THERAPY
"Occupational Therapy Evaluation completed.   Functional Status:  Close SPV standing grooming- pt brushed teeth and washed face at sink, SPV UB dressing  Plan of Care: Patient with no further skilled OT needs in the acute care setting at this time  Discharge Recommendations:  Equipment: Shower Chair. Post-acute therapy No further OT needs noted, defer to Physical Therapy recs.     See \"Rehab Therapy-Acute\" Patient Summary Report for complete documentation.    Pt is a 46 y/o male who presents to acute s/p R transmetatarsal amputation and R gastrocsoleus recession. Pt reports he is going to stay w/ his parents where he will have access to a walk in shower and 24/7 SPV and assist w/ higher level IADLs. Ed. Pt on compensatory methods for LB dressing. Provided equipment handout for shower chair. Recommend DC home to parents house. No further Acute OT needs noted at this time.   "

## 2018-07-05 NOTE — FACE TO FACE
Face to Face Supporting Documentation - Home Health    The encounter with this patient was in whole or in part the primary reason for home health admission.    Date of encounter:   Patient:                    MRN:                       YOB: 2020  Beni Moore  [id number]  1970     Home health to see patient for:  Skilled Nursing care for assessment, interventions & education and Comment: care for home infusion and PICC line care, wound care    Skilled need for:  New Onset Medical Diagnosis osteomyelitis    Skilled nursing interventions to include:  Venous access care, Home IV infusion therapy, Wound Care and Line/Drain/Airway education and care    Homebound status evidenced by:  Needs the assistance of another person in order to leave the home. Leaving home requires a considerable and taxing effort. There is a normal inability to leave the home.    Community Physician to provide follow up care: Nolan Buchanan D.O.     Optional Interventions? No      I certify the face to face encounter for this home health care referral meets the CMS requirements and the encounter/clinical assessment with the patient was, in whole, or in part, for the medical condition(s) listed above, which is the primary reason for home health care. Based on my clinical findings: the service(s) are medically necessary, support the need for home health care, and the homebound criteria are met.  I certify that this patient has had a face to face encounter by myself.  Elsa Barry M.D. - NPI: [id number]

## 2018-07-05 NOTE — THERAPY
"Physical Therapy Treatment completed.   Bed Mobility:  Supine to Sit:  (NT up in chair)  Transfers: Sit to Stand: Stand by Assist  Gait: Level Of Assist: Stand by Assist with Front-Wheel Walker       Plan of Care: Will benefit from Physical Therapy 4 times per week  Discharge Recommendations: Equipment: Front-Wheel Walker.     See \"Rehab Therapy-Acute\" Patient Summary Report for complete documentation.     Pt continues to progress w/ therapy. Pt demonstrating improved ability to manage R LE through mobility. He was given an HEP to maintain R LE while NWB. Pt was 100% compliant w/ NWB throughout all mobility. Pt now going to his parents house which has 2 platform steps. Pt able to return demonstrate stairs at [facility]. Pt was enquiring about a possible scooter for ease of long distance mobility. Pt is currently at a level in which he can DC home w/ HH.  "

## 2018-07-05 NOTE — PROGRESS NOTES
BG at 1700 63.  240cc apple juice given.  Recheck at 1715 BG 70.  Coca-[employer]a given.  Will recheck at 1735.  Pt states he initially felt slightly dizzy, but currently denies symptoms.  Addendum: BG at 1750 123.

## 2018-07-05 NOTE — PROGRESS NOTES
LIMB PRESERVATION SERVICE     47 y.o. male with a past medical history that includes type 2 diabetes and is admitted to [facility] for right foot osteomyelitis. Had R great toe ulcer mid June 2018. Went on vacation near [facility]. R great toe ulcer worsened, underwent R great toe ray amputation in [place], surgically left open. He came back to [place] and presented to ED 6/30.    POD # 3 s/p R TMA and R GSR by Dr. Bernardo  Pain controlled  Splint in place      R TMA  Splint and Dressing removed, old s/s drainage  TMA approximated with sutures  Edema present, mild erythema to medial aspect  Duskiness noted to center of incision on dorsal aspect    R GSR:   Incision approximated with sutures  Mild ecchymosis/ erythema    Palpable pedal pulses      Nursing to apply dressing      ID involved  On IV abx    DM:   Glucose  a1c 13.6%  DM edu has seen, meter given, instructed on how to use insulin       PLAN:  Wound care orders placed for daily changes while in hospital for nursing  IV abx per ID until 8/13  Diabetic boot to be worn at all times to RLE, boot ordered  NWB RLE    D/C plan: Home with HH and [facility]   LPS 7/13, and 7/27 if needed, referral placed for LPS

## 2018-07-05 NOTE — CARE PLAN
Problem: Infection  Goal: Will remain free from infection  Did dressing care with patient and patient's significant other present for teaching.  RLE dressed per plan of care described by wound team, and boot placed by ortho tech.    Problem: Discharge Barriers/Planning  Goal: Patient's continuum of care needs will be met  Discussed discharge planning goals with patient.  Pt expressed he is favoring being discharged to his parents' home with home health services, and would rather not go to SNF.  States he will have support at parents' home.

## 2018-07-05 NOTE — DISCHARGE PLANNING
Agency/Facility Name: [facility]  Spoke To: Bebe  Outcome: Patient declined due to insurance.  RN CM, Pat notified.

## 2018-07-05 NOTE — DISCHARGE PLANNING
Anticipated Discharge Disposition: home with home infusion and HH for wound care and infusion     Action:Received home infusion order. Met with pt and parents to discuss post acute plans. Pt chose [facility] and [facility]. Referrals made.    Barriers to Discharge: none    Plan:F/U with HH and infusion for coordination of care.

## 2018-07-05 NOTE — CONSULTS
Orthopedic Physician Note    Subjective:  Patient doing well, no issues.  Initially had some pain after the block wore off.      Objective:  RLE: dressings clean and dry. Splint on and intact    Blood pressure (!) 167/90, pulse 90, temperature 36.8 °C (98.3 °F), resp. rate 16, height 1.829 m (6'), weight 104 kg (229 lb 4.5 oz), SpO2 95 %.    Labs:  Recent Labs      07/03/18   0210  07/04/18   0438 07/05/18   0514   WBC  12.4*  9.2  10.0   RBC  4.16*  3.87*  4.17*   HEMOGLOBIN  12.3*  11.4*  12.0*   HEMATOCRIT  35.7*  32.8*  35.2*   MCV  85.8  84.8  84.4   MCH  29.6  29.5  28.8   RDW  37.3  36.5  36.4   PLATELETCT  310  294  320   MPV  9.3  9.3  9.1   NEUTSPOLYS  76.20*  63.40  66.00   LYMPHOCYTES  14.90*  22.20  20.70*   MONOCYTES  7.00  10.10  9.90   EOSINOPHILS  0.40  3.00  1.90   BASOPHILS  0.40  0.40  0.80         Recent Labs      07/04/18   0438  07/05/18   0514   SODIUM  137  139   POTASSIUM  3.5*  3.3*   CHLORIDE  104  104   CO2  26  27   GLUCOSE  166*  133*   BUN  6*  5*       Results     Procedure Component Value Units Date/Time    CULTURE TISSUE W/ GRM STAIN [[id number]]  (Abnormal) Collected:  07/02/18 1315    Order Status:  Completed Specimen:  Tissue Updated:  07/04/18 1411     Gram Stain Result No organisms seen.     Significant Indicator POS (POS)     Source TISS     Site Metatarsal Margin     Tissue Culture Growth noted after further incubation, see below for  organism identification.   (A)      Candida albicans  Light growth   (A)    ANAEROBIC CULTURE [[id number]] Collected:  07/02/18 1315    Order Status:  Completed Specimen:  Tissue Updated:  07/04/18 1411     Significant Indicator NEG     Source TISS     Site Metatarsal Margin     Anaerobic Culture, Culture Res Culture in progress.    CULTURE WOUND W/ GRAM STAIN [[id number]]  (Abnormal) Collected:  07/01/18 1320    Order Status:  Completed Specimen:  Wound from Toe Updated:  07/03/18 1231     Significant Indicator POS (POS)     Source WND     Site  "TOE     Culture Result Wound -- (A)     Gram Stain Result No organisms seen.     Culture Result Wound Yeast  Light growth   (A)    Narrative:       Collected By:[id number] AMADOR CANELO A.  Right great toe TMA site.    GRAM STAIN [[id number]] Collected:  07/02/18 1315    Order Status:  Completed Specimen:  Tissue Updated:  07/02/18 1657     Significant Indicator .     Source TISS     Site Metatarsal Margin     Gram Stain Result No organisms seen.    GRAM STAIN [[id number]] Collected:  07/01/18 1320    Order Status:  Completed Specimen:  Wound Updated:  07/02/18 0638     Significant Indicator .     Source WND     Site TOE     Gram Stain Result No organisms seen.    Narrative:       Collected By:[id number] AMADOR CANELO A.  Right great toe TMA site.    BLOOD CULTURE x2 [[id number]] Collected:  06/30/18 2120    Order Status:  Completed Specimen:  Blood from Peripheral Updated:  07/01/18 0718     Significant Indicator NEG     Source BLD     Site PERIPHERAL     Blood Culture No Growth    Note: Blood cultures are incubated for 5 days and  are monitored continuously.Positive blood cultures  are called to the RN and reported as soon as  they are identified.      Narrative:       Per Hospital Policy: Only change Specimen Src: to \"Line\" if  specified by physician order.    BLOOD CULTURE x2 [[id number]] Collected:  06/30/18 2154    Order Status:  Completed Specimen:  Blood from Peripheral Updated:  07/01/18 0718     Significant Indicator NEG     Source BLD     Site PERIPHERAL     Blood Culture No Growth    Note: Blood cultures are incubated for 5 days and  are monitored continuously.Positive blood cultures  are called to the RN and reported as soon as  they are identified.      Narrative:       Per Hospital Policy: Only change Specimen Src: to \"Line\" if  specified by physician order.    URINALYSIS,CULTURE IF INDICATED [[id number]]  (Abnormal) Collected:  07/01/18 0630    Order Status:  Completed Specimen:  Urine Updated:  " 07/01/18 0658     Color Yellow     Character Clear     Specific Gravity 1.020     Ph 6.0     Glucose 500 (A) mg/dL      Ketones >=80 (A) mg/dL      Protein 100 (A) mg/dL      Bilirubin Negative     Urobilinogen, Urine 0.2     Nitrite Negative     Leukocyte Esterase Negative     Occult Blood Moderate (A)     Micro Urine Req Microscopic          Assessment:  s/p R TMA    Plan:  NWB to the RLE  Continue splint at all times  Push nutrition and high protien diet  Control diabetes in the post operative period  Drain pulled by nursing  Will follow  f/u wound care 2 weeks  Ok to discharge from orthopedic standpoint      Libby Meyers F&A fellow  Cell: [phone]  07/03/18

## 2018-07-05 NOTE — PROGRESS NOTES
[facility] Hospitalist Progress Note    Date of Service: 2018    Chief Complaint  47 y.o. male admitted 2018 with DM who injured his right great toe who developed osteomyelitis s/p amputation 18.     Interval Problem Update  K repleted  Hyperglycemia improved. Glucose 63, stop mealtime reg insulin  Hypertensive, increasing lisinopril  Patient wants IV abx from home, plan to stay with parents. SW updated    Consultants/Specialty  ID  Ortho    Disposition  Wound care 2 weeks  NWB to RLE  PICC in place        Review of Systems   Constitutional: Negative for fever.   Eyes: Negative for blurred vision.   Respiratory: Negative for shortness of breath.    Cardiovascular: Negative for chest pain.   Gastrointestinal: Negative for abdominal pain.   Musculoskeletal: Positive for joint pain.   Neurological: Negative for headaches.      Physical Exam  Laboratory/Imaging   Hemodynamics  Temp (24hrs), Av.9 °C (98.4 °F), Min:36.6 °C (97.8 °F), Max:37.3 °C (99.2 °F)   Temperature: 36.9 °C (98.4 °F)  Pulse  Av.3  Min: 77  Max: 100    Blood Pressure: (!) 169/90      Respiratory      Respiration: 16, Pulse Oximetry: 91 %        RUL Breath Sounds: Clear, RML Breath Sounds: Clear, RLL Breath Sounds: Diminished, CHRIS Breath Sounds: Clear, LLL Breath Sounds: Diminished    Fluids    Intake/Output Summary (Last 24 hours) at 18 1105  Last data filed at 18 2130   Gross per 24 hour   Intake              340 ml   Output             3200 ml   Net            -2860 ml       Nutrition  Orders Placed This Encounter   Procedures   • Diet Order Consistent Carbohydrate     Standing Status:   Standing     Number of Occurrences:   1     Order Specific Question:   Diet:     Answer:   Consistent Carbohydrate [4]     Physical Exam   Constitutional: He is oriented to person, place, and time. He appears well-developed and well-nourished. He is cooperative.   HENT:   Head: Normocephalic and atraumatic.   Eyes: Conjunctivae are  normal.   Cardiovascular: Normal rate and regular rhythm.    Pulmonary/Chest: Effort normal and breath sounds normal. He has no wheezes. He has no rales.   Abdominal: Soft. There is no tenderness. There is no rebound and no guarding.   Musculoskeletal: He exhibits no edema.   Right foot in cast   Neurological: He is alert and oriented to person, place, and time.   Skin: Skin is warm and dry.   Nursing note and vitals reviewed.      Recent Labs      07/03/18   0210  07/04/18   0438  07/05/18   0514   WBC  12.4*  9.2  10.0   RBC  4.16*  3.87*  4.17*   HEMOGLOBIN  12.3*  11.4*  12.0*   HEMATOCRIT  35.7*  32.8*  35.2*   MCV  85.8  84.8  84.4   MCH  29.6  29.5  28.8   MCHC  34.5  34.8  34.1   RDW  37.3  36.5  36.4   PLATELETCT  310  294  320   MPV  9.3  9.3  9.1     Recent Labs      07/04/18   0438  07/05/18   0514   SODIUM  137  139   POTASSIUM  3.5*  3.3*   CHLORIDE  104  104   CO2  26  27   GLUCOSE  166*  133*   BUN  6*  5*   CREATININE  0.81  0.72   CALCIUM  7.9*  8.5                      Assessment/Plan     * Subacute osteomyelitis of right foot (HCC)   Assessment & Plan    Exposed bone, right metatarsal shaft stump, bleeding noted from the same.   7/2:  vanco, unasyn IV.  ID consultation appreciated, wound culture with MSSA, BCs negative.    7/6: 6 weeks (end 8/13) of ancef per ID, PICC today        Status post amputation of great toe, right (HCC)   Assessment & Plan    Transmetatarsal amputation of right great toe, open wound, bleeding noted from metatarsal stump.  Patient was apparently told to get a wound vac placed after being discharged. limb preservation service consult appreciated.  NPO at MN for definite need for OR debridement in a.m.  coags ordered.  Hold dvt prophylaxis in a.m.  Xray neg for OM, but elevated CRP 7.23, sed rate and BCs x 2 ordered .  s/p amputation        Overweight (BMI 25.0-29.9)   Assessment & Plan    Body mass index is 29.16 kg/m².          Diabetes mellitus with coincident  hypertension (HCC)   Assessment & Plan    On admission started low dose ACEI for asif-protective effects in DM.    7/2:  Added metoprolol 25mg po bid, hydralazine 25mg po q 4 hours prn Bp >160/90.    Increasing lisinopril        Type 2 diabetes mellitus with hyperglycemia, without long-term current use of insulin (HCC)   Assessment & Plan    On oral medication regimen as outpatient.    hgba1c 13.6%  Glucose controlled on lantus and glyburide        Normocytic anemia   Assessment & Plan    Likely due to chronic disease without notable blood loss.  Transfuse if needed for hemoglobin less than 7 gm/dL            Quality-Core Measures   Reviewed items::  EKG reviewed, Labs reviewed, Medications reviewed and Radiology images reviewed  Farrell catheter::  No Farrell  DVT prophylaxis - mechanical:  SCDs

## 2018-07-05 NOTE — DISCHARGE SUMMARY
"Discharge Summary    CHIEF COMPLAINT ON ADMISSION  Chief Complaint   Patient presents with   • Other     \"I had a toe amputated yesterday and they want me to get a wound vac here and care here until my pcp can set up home health care.\" Pt states his pain is tolerable with the Norco he takes at home. Pt denies any concerns with the wound itself, but states he was sent here for the wound vac due to inability to receive homehealth care at this time.        Reason for Admission  Other     Admission Date  6/30/2018    CODE STATUS  Full Code    HPI & HOSPITAL COURSE  This is a 47 y.o. male here with DM who presented with right foot osteomyelitis.    Patient had stubbed and scrap skin from his right 1st toe about 2.5 weeks prior to admission. Patient was on vacation in [place] and had been swimming after the injury. His toe developed pain and swelling and he was found to have osteomyelitis at a [facility] in [place]. He underwent transmetatarsal amputation of his 1st toe and due to a family issue, he was discharged on bactrim and recommended he have close followup for a wound vac. Patient then presented to [facility] ED.   Dr. Bernardo with ortho was consulted and patient had right transmetatarsel amputation with I&D 7/2. Patient was started on unasyn and vanc and infectious disease was consulted. Culture from [facility] on 6/29 grew MSSA. PICC line was placed and he was transitioned to ertapenem with end date of 8/13/18.  He was noted to have A1c 13.6%. While inpatient he had good control on glyburide and lantus 10U qhs. He received diabetes education. He was also noted to have hypertension and given his diabetes he was started on lisinopril.        Therefore, he is discharged in good and stable condition to home with organized home healthcare and close outpatient follow-up.    The patient met 2-midnight criteria for an inpatient stay at the time of discharge.    Discharge Date  7/6/18    FOLLOW UP ITEMS POST " DISCHARGE  Wound care  DM and HTN management    DISCHARGE DIAGNOSES  Principal Problem:    Subacute osteomyelitis of right foot (HCC) POA: Yes  Active Problems:    Status post amputation of great toe, right (HCC) POA: Yes    Normocytic anemia POA: Yes    Type 2 diabetes mellitus with hyperglycemia, without long-term current use of insulin (HCC) POA: Yes    Diabetes mellitus with coincident hypertension (HCC) POA: Yes    Overweight (BMI 25.0-29.9) POA: Yes    MSSA (methicillin susceptible Staphylococcus aureus) infection POA: Yes    S/P transmetatarsal amputation of foot, right (HCC) POA: Yes  Resolved Problems:    * No resolved hospital problems. *      FOLLOW UP  Future Appointments  Date Time Provider Department Center   7/13/2018 8:00 AM Ravi Bernardo M.D. [facility]  [address]  [address]  [facility] [address]  [phone]    In 2 weeks  For suture removal, For wound re-check    [facility]  [address] [facility] [id number] [phone]        Nolan Buchanan D.O.  [address] [facility] [id number] [phone]    In 1 week      Ravi Bernardo M.D.  [address] [facility] [id number] [phone]            MEDICATIONS ON DISCHARGE     Medication List      START taking these medications      Instructions   insulin glargine 100 UNIT/ML Soln  Commonly known as:  LANTUS   Inject 10 Units as instructed every evening for 30 days.  Dose:  10 Units     lisinopril 20 MG Tabs  Start taking on:  7/7/2018  Commonly known as:  PRINIVIL   Take 1 Tab by mouth every day.  Dose:  20 mg     NS SOLN 100 mL with ertapenem 1 GM SOLR 1,000 mg  Start taking on:  7/7/2018   1,000 mg by Intravenous route every 24 hours.  Dose:  1 g        CONTINUE taking these medications      Instructions   glyBURIDE 5 MG Tabs  Commonly known as:  DIABETA   Take 5 mg by mouth every morning with breakfast.  Dose:  5 mg     HYDROcodone-acetaminophen 5-325 MG Tabs per tablet  Commonly known as:   NORCO   Take 1-2 Tabs by mouth every four hours as needed.  Dose:  1-2 Tab     omeprazole 20 MG delayed-release capsule  Commonly known as:  PRILOSEC   Take 20 mg by mouth every day.  Dose:  20 mg        STOP taking these medications    sulfamethoxazole-trimethoprim 800-160 MG tablet  Commonly known as:  BACTRIM DS            Allergies  No Known Allergies    DIET  Orders Placed This Encounter   Procedures   • Diet Order Consistent Carbohydrate     Standing Status:   Standing     Number of Occurrences:   1     Order Specific Question:   Diet:     Answer:   Consistent Carbohydrate [4]       ACTIVITY  As tolerated.  Weight bearing as tolerated    CONSULTATIONS  Ortho  ID    PROCEDURES  transmetatarsal amputation of his 1st toe    LABORATORY  Lab Results   Component Value Date    SODIUM 138 07/06/2018    POTASSIUM 4.1 07/06/2018    CHLORIDE 105 07/06/2018    CO2 26 07/06/2018    GLUCOSE 207 (H) 07/06/2018    BUN 8 07/06/2018    CREATININE 0.74 07/06/2018        Lab Results   Component Value Date    WBC 11.2 (H) 07/06/2018    HEMOGLOBIN 11.7 (L) 07/06/2018    HEMATOCRIT 34.8 (L) 07/06/2018    PLATELETCT 346 07/06/2018        Total time of the discharge process exceeds 45 minutes.

## 2018-07-05 NOTE — CONSULTS
Diabetes Education:   Diabetes Educator met with patient on 7/3/18 and provided him with meter to use after discharge.  Patient instructed on how to use insulin pen and how to draw insulin from a vial today.   He was able to accurately do a return demonstration.   Reviewed site rotation, types of insulin, storage of insulin and proper disposal of sharps.  If there are any further needs please call [phone]

## 2018-07-05 NOTE — DISCHARGE PLANNING
ATTN: Case Management  RE: Referral for Home Health    Reason for referral denial: [facility] is not contracted with [facility]                We would like to take this opportunity to thank you for submitting a referral for your patient to continue the journey to recovery with [facility]. We hope to facilitate continued referrals from your facility as our goal is to provide quality, skilled care to as many patients as possible.            Unfortunately, we are not able to accept your patient into our service for the reason listed above. If further clarity is needed, our Intake Coordinators are available to discuss any barriers to service.            If you have any questions or concerns regarding this or future patients’ transition to Home Health, please do not hesitate to contact us. We are open for referrals 7 days a week from 8AM to 5PM at [phone].      We look forward to collaborating with you in the future,  [facility] Team

## 2018-07-05 NOTE — CARE PLAN
Problem: Pain Management  Goal: Pain level will decrease to patient's comfort goal  Patient declined pain medication.  Patient resting comfortably.

## 2018-07-05 NOTE — DISCHARGE PLANNING
Anticipated Discharge Disposition: Home with home infusion and HH    Action: Pt has decided on home with HH and infusion. Await home infusion orders.    Barriers to Discharge: Coordination of HH and Infusion    Plan: Facilitate hh and infusion.

## 2018-07-05 NOTE — PROGRESS NOTES
Infectious Disease Progress Note    Author: ELEAZAR Limon Date & Time of service: 2018  1:52 PM    Chief Complaint:  Diabetic foot infection    Interval History:  2018-MAXIMUM TEMPERATURE 98.6. Underwent surgery today. WBCs 8.7 platelets 335 creatinine 0.85  7/3- AF, WBC 12.4, mild pain to RLE being controlled with pain meds, currently denies any pain, no issue with abx, agreeable to PICC.   -AF, WBC 9.2, tolerating abx, currently denies pain to RLE being controlled with pain meds can get up to 5/10, wants to go to SNF for IV antibiotic treatment.  - Tm 99.2, WBC 10.0, no issue with abx, sitting up in chair, pain to RLE well controlled- currently denies any pain, now wanting to do home infusion instead of SNF.  Labs Reviewed, Medications Reviewed, Radiology Reviewed and Wound Reviewed.    Review of Systems:  Review of Systems   Constitutional: Negative for chills, fever and malaise/fatigue.   HENT: Negative for sore throat.    Respiratory: Negative for shortness of breath.    Cardiovascular: Negative for chest pain, palpitations and leg swelling.   Gastrointestinal: Positive for constipation. Negative for abdominal pain, diarrhea, nausea and vomiting.        Last BM 6 days ago   Genitourinary: Negative for dysuria.   Musculoskeletal: Positive for joint pain and myalgias.        Minimal to RLE   Skin: Negative for rash.   Neurological: Negative for sensory change and headaches.       Hemodynamics:  Temp (24hrs), Av.9 °C (98.4 °F), Min:36.6 °C (97.8 °F), Max:37.3 °C (99.2 °F)  Temperature: 36.9 °C (98.4 °F)  Pulse  Av.3  Min: 77  Max: 100   Blood Pressure: (!) 169/90       Physical Exam:  Physical Exam   Constitutional: He is oriented to person, place, and time. He appears well-developed and well-nourished. No distress.   HENT:   Head: Normocephalic and atraumatic.   Eyes: EOM are normal. Pupils are equal, round, and reactive to light.   Neck: Normal range of motion. Neck supple.    Cardiovascular: Normal rate, regular rhythm and normal heart sounds.  Exam reveals no gallop and no friction rub.    Pulmonary/Chest: Effort normal and breath sounds normal. He has no wheezes. He has no rales.   Abdominal: Soft. Bowel sounds are normal. He exhibits no distension. There is no tenderness.   Musculoskeletal: He exhibits tenderness. He exhibits no edema.   R GSR- sutures in place, mild erythema/ ecchymosis along incision, scant ss drainage without odor.    R TMA- sutures in place, minimal bloody/ ss drainage, no odor, mild erythema/ecchymosis along surgical incision, no maceration, faint pulses.     RUE PICC- CDI, non tender, no erythema.   Neurological: He is alert and oriented to person, place, and time.   Skin: Skin is warm and dry. There is erythema.   Abrasions to BLE- healing.   Nursing note and vitals reviewed.      Meds:    Current Facility-Administered Medications:   •  potassium chloride SA  •  [START ON 7/6/2018] lisinopril  •  glyBURIDE  •  lidocaine  •  ceFAZolin  •  hydrALAZINE  •  Notify provider if pain remains uncontrolled **AND** Use the numeric rating scale (NRS-11) on regular floors and Critical-Care Pain Observation Tool (CPOT) on ICUs/Trauma to assess pain **AND** Pulse Ox (Oximetry) **AND** Pharmacy Consult Request **AND** If patient difficult to arouse and/or has respiratory depression, stop any opiates that are currently infusing and call a Rapid Response. **AND** oxyCODONE immediate-release **AND** oxyCODONE immediate-release **AND** morphine injection  •  cloNIDine  •  ondansetron  •  ondansetron  •  promethazine  •  promethazine  •  prochlorperazine  •  senna-docusate **AND** polyethylene glycol/lytes **AND** magnesium hydroxide **AND** bisacodyl  •  acetaminophen  •  insulin glargine **AND** [DISCONTINUED] insulin lispro **AND** insulin lispro **AND** Accu-Chek ACHS **AND** NOTIFY MD and PharmD **AND** glucose 4 g **AND** dextrose 50%    Labs:  Recent Labs      07/03/18    0210  07/04/18 0438 07/05/18 0514   WBC  12.4*  9.2  10.0   RBC  4.16*  3.87*  4.17*   HEMOGLOBIN  12.3*  11.4*  12.0*   HEMATOCRIT  35.7*  32.8*  35.2*   MCV  85.8  84.8  84.4   MCH  29.6  29.5  28.8   RDW  37.3  36.5  36.4   PLATELETCT  310  294  320   MPV  9.3  9.3  9.1   NEUTSPOLYS  76.20*  63.40  66.00   LYMPHOCYTES  14.90*  22.20  20.70*   MONOCYTES  7.00  10.10  9.90   EOSINOPHILS  0.40  3.00  1.90   BASOPHILS  0.40  0.40  0.80     Recent Labs      07/04/18 0438 07/05/18 0514   SODIUM  137  139   POTASSIUM  3.5*  3.3*   CHLORIDE  104  104   CO2  26  27   GLUCOSE  166*  133*   BUN  6*  5*     Recent Labs      07/04/18 0438 07/05/18 0514   ALBUMIN  2.5*   --    CREATININE  0.81  0.72       Imaging:  IR-PICC LINE PLACEMENT   7/3/2018   HISTORY/REASON FOR EXAM:   PICC placement.      TECHNIQUE/EXAM DESCRIPTION AND NUMBER OF VIEWS:   PICC line insertion with ultrasound guidance.  The procedure was performed using maximal sterile barrier technique including sterile gown, mask, cap, and donning of sterile gloves following appropriate hand hygiene and/or sterile scrub. Patient skin site was prepped with 2% Chlorhexidine solution.     FINDINGS:  PICC line insertion with Ultrasound Guidance was performed by qualified nursing staff without the assistance of a Radiologist. PICC positioning appropriateness confirmed by 3CG technology; chest xray only needed in the instance 3CG unable to confirm placement.      IMPRESSION:              Ultrasound-guided PICC placement performed by qualified nursing staff as above.       Micro:  Results     Procedure Component Value Units Date/Time    CULTURE TISSUE W/ GRM STAIN [[id number]]  (Abnormal) Collected:  07/02/18 1315    Order Status:  Completed Specimen:  Tissue Updated:  07/05/18 1254     Significant Indicator POS (POS)     Source TISS     Site Metatarsal Margin     Tissue Culture Growth noted after further incubation, see below for  organism identification.   (A)  "    Gram Stain Result No organisms seen.     Tissue Culture Candida albicans  Light growth   (A)    ANAEROBIC CULTURE [[id number]] Collected:  07/02/18 1315    Order Status:  Completed Specimen:  Tissue Updated:  07/05/18 1254     Significant Indicator NEG     Source TISS     Site Metatarsal Margin     Anaerobic Culture, Culture Res No Anaerobes isolated.    CULTURE WOUND W/ GRAM STAIN [[id number]]  (Abnormal) Collected:  07/01/18 1320    Order Status:  Completed Specimen:  Wound from Toe Updated:  07/03/18 1231     Significant Indicator POS (POS)     Source WND     Site TOE     Culture Result Wound -- (A)     Gram Stain Result No organisms seen.     Culture Result Wound Yeast  Light growth   (A)    Narrative:       Collected By:[id number] MRAJORIE BRYAN.  Right great toe TMA site.    GRAM STAIN [[id number]] Collected:  07/02/18 1315    Order Status:  Completed Specimen:  Tissue Updated:  07/02/18 1657     Significant Indicator .     Source TISS     Site Metatarsal Margin     Gram Stain Result No organisms seen.    GRAM STAIN [[id number]] Collected:  07/01/18 1320    Order Status:  Completed Specimen:  Wound Updated:  07/02/18 0638     Significant Indicator .     Source WND     Site TOE     Gram Stain Result No organisms seen.    Narrative:       Collected By:[id number] MARJORIE BRYAN.  Right great toe TMA site.    BLOOD CULTURE x2 [[id number]] Collected:  06/30/18 2120    Order Status:  Completed Specimen:  Blood from Peripheral Updated:  07/01/18 0718     Significant Indicator NEG     Source BLD     Site PERIPHERAL     Blood Culture No Growth    Note: Blood cultures are incubated for 5 days and  are monitored continuously.Positive blood cultures  are called to the RN and reported as soon as  they are identified.      Narrative:       Per Hospital Policy: Only change Specimen Src: to \"Line\" if  specified by physician order.    BLOOD CULTURE x2 [[id number]] Collected:  06/30/18 2154    Order Status:  Completed " "Specimen:  Blood from Peripheral Updated:  07/01/18 0718     Significant Indicator NEG     Source BLD     Site PERIPHERAL     Blood Culture No Growth    Note: Blood cultures are incubated for 5 days and  are monitored continuously.Positive blood cultures  are called to the RN and reported as soon as  they are identified.      Narrative:       Per Hospital Policy: Only change Specimen Src: to \"Line\" if  specified by physician order.    URINALYSIS,CULTURE IF INDICATED [[id number]]  (Abnormal) Collected:  07/01/18 0630    Order Status:  Completed Specimen:  Urine Updated:  07/01/18 0658     Color Yellow     Character Clear     Specific Gravity 1.020     Ph 6.0     Glucose 500 (A) mg/dL      Ketones >=80 (A) mg/dL      Protein 100 (A) mg/dL      Bilirubin Negative     Urobilinogen, Urine 0.2     Nitrite Negative     Leukocyte Esterase Negative     Occult Blood Moderate (A)     Micro Urine Req Microscopic          Assessment:  Active Hospital Problems    Diagnosis   • *Subacute osteomyelitis of right foot ([facility]) [M86.271]   • Status post amputation of great toe, right ([facility]) [Z89.411]   • Normocytic anemia [D64.9]   • Type 2 diabetes mellitus with hyperglycemia, without long-term current use of insulin ([facility]) [E11.65]   • Diabetes mellitus with coincident hypertension ([facility]) [E11.9, I10]   • Overweight (BMI 25.0-29.9) [E66.3]       Plan:  Diabetic foot infection/osteomyelitis  Underwent Right TMA with I&D and GSR on 7/2 with Dr. Bernardo  OR cx +candida albicans  Cx from [facility] on 6/29 +MSSA (see Media tab)  D/C'd IV Vanco and Unasyn  Continue IV Ancef while Inpt  Plan for 6 weeks IV abx d/t R I&D to multiple compartments  Estimated end date 8/13/18  Pt would now like to do home infusion-   Orders for IV Ertapenem 1 gram daily through 8/13/18 via home infusion done on 7/5, faxed to CM  Will need test does of Ertapenem prior to discharge  Being followed by [facility]    Uncontrolled diabetes mellitus  HgA1C 13.6% on " 6/30/18  Control blood sugars for wound healing and control infection      LM for CM- Pat regarding orders for home infusion.

## 2018-07-06 VITALS
BODY MASS INDEX: 31.05 KG/M2 | OXYGEN SATURATION: 95 % | DIASTOLIC BLOOD PRESSURE: 89 MMHG | HEART RATE: 87 BPM | HEIGHT: 72 IN | SYSTOLIC BLOOD PRESSURE: 144 MMHG | RESPIRATION RATE: 17 BRPM | WEIGHT: 229.28 LBS | TEMPERATURE: 97.8 F

## 2018-07-06 LAB
ANION GAP SERPL CALC-SCNC: 7 MMOL/L (ref 0–11.9)
ANISOCYTOSIS BLD QL SMEAR: ABNORMAL
BACTERIA BLD CULT: NORMAL
BASOPHILS # BLD AUTO: 0.9 % (ref 0–1.8)
BASOPHILS # BLD: 0.1 K/UL (ref 0–0.12)
BUN SERPL-MCNC: 8 MG/DL (ref 8–22)
CALCIUM SERPL-MCNC: 8.4 MG/DL (ref 8.5–10.5)
CHLORIDE SERPL-SCNC: 105 MMOL/L (ref 96–112)
CO2 SERPL-SCNC: 26 MMOL/L (ref 20–33)
CREAT SERPL-MCNC: 0.74 MG/DL (ref 0.5–1.4)
EOSINOPHIL # BLD AUTO: 0.19 K/UL (ref 0–0.51)
EOSINOPHIL NFR BLD: 1.7 % (ref 0–6.9)
ERYTHROCYTE [DISTWIDTH] IN BLOOD BY AUTOMATED COUNT: 37.7 FL (ref 35.9–50)
GLUCOSE BLD-MCNC: 137 MG/DL (ref 65–99)
GLUCOSE BLD-MCNC: 147 MG/DL (ref 65–99)
GLUCOSE BLD-MCNC: 163 MG/DL (ref 65–99)
GLUCOSE SERPL-MCNC: 207 MG/DL (ref 65–99)
HCT VFR BLD AUTO: 34.8 % (ref 42–52)
HGB BLD-MCNC: 11.7 G/DL (ref 14–18)
LYMPHOCYTES # BLD AUTO: 2.63 K/UL (ref 1–4.8)
LYMPHOCYTES NFR BLD: 23.5 % (ref 22–41)
MACROCYTES BLD QL SMEAR: ABNORMAL
MANUAL DIFF BLD: NORMAL
MCH RBC QN AUTO: 28.5 PG (ref 27–33)
MCHC RBC AUTO-ENTMCNC: 33.6 G/DL (ref 33.7–35.3)
MCV RBC AUTO: 84.9 FL (ref 81.4–97.8)
MICROCYTES BLD QL SMEAR: ABNORMAL
MONOCYTES # BLD AUTO: 0.19 K/UL (ref 0–0.85)
MONOCYTES NFR BLD AUTO: 1.7 % (ref 0–13.4)
MORPHOLOGY BLD-IMP: NORMAL
NEUTROPHILS # BLD AUTO: 8.09 K/UL (ref 1.82–7.42)
NEUTROPHILS NFR BLD: 71.3 % (ref 44–72)
NEUTS BAND NFR BLD MANUAL: 0.9 % (ref 0–10)
NRBC # BLD AUTO: 0 K/UL
NRBC BLD-RTO: 0 /100 WBC
PLATELET # BLD AUTO: 346 K/UL (ref 164–446)
PLATELET BLD QL SMEAR: NORMAL
PMV BLD AUTO: 9.1 FL (ref 9–12.9)
POTASSIUM SERPL-SCNC: 4.1 MMOL/L (ref 3.6–5.5)
RBC # BLD AUTO: 4.1 M/UL (ref 4.7–6.1)
RBC BLD AUTO: PRESENT
SIGNIFICANT IND 70042: NORMAL
SITE SITE: NORMAL
SODIUM SERPL-SCNC: 138 MMOL/L (ref 135–145)
SOURCE SOURCE: NORMAL
WBC # BLD AUTO: 11.2 K/UL (ref 4.8–10.8)

## 2018-07-06 PROCEDURE — 80048 BASIC METABOLIC PNL TOTAL CA: CPT

## 2018-07-06 PROCEDURE — 700102 HCHG RX REV CODE 250 W/ 637 OVERRIDE(OP): Performed by: INTERNAL MEDICINE

## 2018-07-06 PROCEDURE — 85007 BL SMEAR W/DIFF WBC COUNT: CPT

## 2018-07-06 PROCEDURE — A9270 NON-COVERED ITEM OR SERVICE: HCPCS | Performed by: INTERNAL MEDICINE

## 2018-07-06 PROCEDURE — 99239 HOSP IP/OBS DSCHRG MGMT >30: CPT | Performed by: INTERNAL MEDICINE

## 2018-07-06 PROCEDURE — 82962 GLUCOSE BLOOD TEST: CPT

## 2018-07-06 PROCEDURE — 85027 COMPLETE CBC AUTOMATED: CPT

## 2018-07-06 PROCEDURE — 700111 HCHG RX REV CODE 636 W/ 250 OVERRIDE (IP): Performed by: INTERNAL MEDICINE

## 2018-07-06 PROCEDURE — 700111 HCHG RX REV CODE 636 W/ 250 OVERRIDE (IP): Performed by: NURSE PRACTITIONER

## 2018-07-06 PROCEDURE — 700105 HCHG RX REV CODE 258: Performed by: INTERNAL MEDICINE

## 2018-07-06 RX ORDER — INSULIN GLARGINE 100 [IU]/ML
10 INJECTION, SOLUTION SUBCUTANEOUS EVERY EVENING
Qty: 3 ML | Refills: 0 | Status: SHIPPED | OUTPATIENT
Start: 2018-07-06 | End: 2019-11-21 | Stop reason: SDUPTHER

## 2018-07-06 RX ORDER — LISINOPRIL 20 MG/1
20 TABLET ORAL DAILY
Qty: 30 TAB | Refills: 1 | Status: SHIPPED | OUTPATIENT
Start: 2018-07-07 | End: 2018-08-15

## 2018-07-06 RX ADMIN — INSULIN LISPRO 1 UNITS: 100 INJECTION, SOLUTION INTRAVENOUS; SUBCUTANEOUS at 06:10

## 2018-07-06 RX ADMIN — CEFAZOLIN SODIUM 2 G: 2 INJECTION, SOLUTION INTRAVENOUS at 06:06

## 2018-07-06 RX ADMIN — SODIUM CHLORIDE 1000 MG: 900 INJECTION INTRAVENOUS at 11:33

## 2018-07-06 RX ADMIN — LISINOPRIL 20 MG: 20 TABLET ORAL at 08:30

## 2018-07-06 RX ADMIN — GLYBURIDE 5 MG: 5 TABLET ORAL at 08:27

## 2018-07-06 NOTE — DISCHARGE PLANNING
Received Choice form at [phone]  Agency/Facility Name: [facility]  Referral sent per Choice form at [phone].  Per verbal from JANICE Merrill, referral sent to [facility].

## 2018-07-06 NOTE — DISCHARGE PLANNING
Anticipated Discharge Disposition: Home with [facility] and HH    Action: [facility] accepted. Patient and family teaching done for home infusion.     Barriers to Discharge: None    Plan: Patient to dc home with parents who will assist in home infusion.

## 2018-07-06 NOTE — DISCHARGE INSTRUCTIONS
2Discharge Instructions    Discharged to home by car with relative. Discharged via wheelchair, hospital escort: Yes.  Special equipment needed: Walker    Be sure to schedule a follow-up appointment with your primary care doctor or any specialists as instructed.     Discharge Plan:   Influenza Vaccine Indication: Not indicated: Previously immunized this influenza season and > 8 years of age  20understand that a diet low in cholesterol, fat, and sodium is recommended for good health. Unless I have been given specific instructions below for another diet, I accept this instruction as my diet prescription.   Other diet: diabetic    Special Instructions: Home with With home health nurse, wound and infusion care, PT. F/u PCP in 1 week    · Is patient discharged on Warfarin / Coumadin?       Depression / Suicide Risk    As you are discharged from this [facility] facility, it is important to learn how to keep safe from harming yourself.    Recognize the warning signs:  · Abrupt changes in personality, positive or negative- including increase in energy   · Giving away possessions  · Change in eating patterns- significant weight changes-  positive or negative  · Change in sleeping patterns- unable to sleep or sleeping all the time   · Unwillingness or inability to communicate  · Depression  · Unusual sadness, discouragement and loneliness  · Talk of wanting to die  · Neglect of personal appearance   · Rebelliousness- reckless behavior  · Withdrawal from people/activities they love  · Confusion- inability to concentrate     If you or a loved one observes any of these behaviors or has concerns about self-harm, here's what you can do:  · Talk about it- your feelings and reasons for harming yourself  · Remove any means that you might use to hurt yourself (examples: pills, rope, extension cords, firearm)  · Get professional help from the community (Mental Health, Substance Abuse, psychological counseling)  · Do not be alone:Call  your Safe Contact- someone whom you trust who will be there for you.  · Call your local CRISIS HOTLINE 943-9889 or 301-533-6597  · Call your local Children's Mobile Crisis Response Team [place] (923) 705-4757 or www.ProNerve  · Call the toll free National Suicide Prevention Hotlines   · National Suicide Prevention Lifeline 535-659-XCLJ (0925)  · National Hope Line Network 800-SUICIDE (476-9601)    Bone and Joint Infections, Adult  Introduction  Bone infections (osteomyelitis) and joint infections (septic arthritis) occur when bacteria or other germs get inside a bone or a joint. This can happen if you have an infection in another part of your body that spreads through your blood. Germs from your skin or from outside of your body can also cause this type of infection if you have a wound or a broken bone (fracture) that breaks the skin.  Anyone can get a bone infection or joint infection. You may be more likely to get this type of infection if you have a condition, such as diabetes, that lowers your ability to fight infection or increases your chances of getting an infection. Bone and joint infections can cause damage, and they can spread to other areas of your body. They need to be treated quickly.  What are the causes?  Most bone and joint infections are caused by bacteria. They can also be caused by other germs, such as viruses and funguses.  What increases the risk?  This condition is more likely to develop in:  · People who recently had surgery, especially bone or joint surgery.  · People who have a long-term (chronic) disease, such as:  ¨ HIV (human immunodeficiency virus).  ¨ Diabetes.  ¨ Rheumatoid arthritis.  ¨ Sickle cell anemia.  · Elderly people.  · People who take medicines that block or weaken the body’s defense system (immune system).  · People who have a condition that reduces their blood flow.  · People who are on kidney dialysis.  · People who have an artificial joint.  · People who  have had a joint or bone repaired with plates or screws (surgical hardware).  · People who use or abuse IV drugs.  · People who have had trauma, such as stepping on a nail.  What are the signs or symptoms?  Symptoms vary depending on the type and location of your infection. Common symptoms of bone and joint infections include:  · Fever and chills.  · Redness and warmth.  · Swelling.  · Pain and stiffness.  · Drainage of fluid or pus near the infection.  · Weight loss and fatigue.  · Decreased ability to use a hand or foot.  How is this diagnosed?  This condition may be diagnosed based on symptoms, medical history, a physical exam, and diagnostic tests. Tests can help to identify the cause of the infection. You may have various tests, such as:  · A sample of tissue, fluid, or blood taken to be examined under a microscope.  · A procedure to remove fluid from the infected joint with a needle (joint aspiration) for testing in a lab.  · Pus or discharge swabbed from a wound for testing to identify germs and to determine what type of medicine will kill them (culture and sensitivity).  · Blood tests to look for evidence of infection and inflammation (biomarkers).  · Imaging studies to determine how severe the bone or joint infection is. These may include:  ¨ X-rays.  ¨ CT scan.  ¨ MRI.  ¨ Bone scan.  How is this treated?  Treatment depends on the cause and type of infection. Antibiotic medicines are usually the first treatment for a bone or joint infection. Treatment with antibiotics may include:  · Getting IV antibiotics. This may be done in a hospital at first. You may have to continue IV antibiotics at home for several weeks. You may also have to take antibiotics by mouth for several weeks after that.  · Taking more than one kind of antibiotic. Treatment may start with a type of antibiotic that works against many different bacteria (broad spectrum antibiotics). IV antibiotics may be changed if tests show that another  type may work better.  Other treatments may include:  · Draining fluid from the joint by placing a needle into it (aspiration).  · Surgery to remove:  ¨ Dead or dying tissue from a bone or joint.  ¨ An infected artificial joint.  ¨ Infected plates or screws that were used to repair a broken bone.  Follow these instructions at home:  · Take medicines only as directed by your health care provider.  · Take your antibiotic medicine as directed by your health care provider. Finish the antibiotic even if you start to feel better.  · Follow instructions from your health care provider about how to take IV antibiotics at home.  · Ask your health care provider if you have any restrictions on your activities.  · Keep all follow-up visits as directed by your health care provider. This is important.  Contact a health care provider if:  · You have a fever or chills.  · You have redness, warmth, pain, or swelling that returns after treatment.  Get help right away if:  · You have rapid breathing or you have trouble breathing.  · You have chest pain.  · You cannot drink fluids or make urine.  · The affected arm or leg swells, changes color, or turns blue.  This information is not intended to replace advice given to you by your health care provider. Make sure you discuss any questions you have with your health care provider.  Document Released: 12/18/2006 Document Revised: 05/25/2017 Document Reviewed: 12/16/2015  © 2017 Elsevier  Toe Amputation  Toe amputation is a surgical procedure to remove all or part of a toe. You may have this procedure if:  · Tissue in your toe is dying because of poor blood supply.  · You have a severe infection in your toe.  Removing your toe keeps nearby tissue healthy. If the toe is infected, removing it helps to keep the infection from spreading.  Tell a health care provider about:  · Any allergies you have.  · All medicines you are taking, including vitamins, herbs, eye drops, creams, and  over-the-counter medicines.  · Any problems you or family members have had with anesthetic medicines.  · Any blood disorders you have.  · Any surgeries you have had.  · Any medical conditions you have.  · Whether you are pregnant or may be pregnant.  What are the risks?  Generally, this is a safe procedure. However, problems may occur, including:  · Bleeding.  · Buildup of blood and fluid (hematoma).  · Infection.  · Allergic reactions to medicines.  · Tissue death in the flap of skin (flap necrosis).  · Trouble with healing.  · Minor changes in the way that you walk (your gait).  · Feeling pain in the area that was removed (phantom pain). This is rare.  What happens before the procedure?  · Follow instructions from your health care provider about eating or drinking restrictions.  · Ask your health care provider about:  ¨ Changing or stopping your regular medicines. This is especially important if you are taking diabetes medicines or blood thinners.  ¨ Taking medicines such as aspirin and ibuprofen. These medicines can thin your blood. Do not take these medicines before your procedure if your health care provider instructs you not to.  · You may be given antibiotic medicine to help prevent infection.  · Plan to have someone take you home after the procedure.  · If you will be going home right after the procedure, plan to have someone with you for 24 hours.  What happens during the procedure?  · You will be given one or more of the following:  ¨ A medicine to help you relax (sedative).  ¨ A medicine to numb the area (local anesthetic).  ¨ A medicine to make you fall asleep (general anesthetic).  ¨ A medicine that is injected into your spine to numb the area below and slightly above the injection site (spinal anesthetic).  ¨ A medicine that is injected into an area of your body to numb everything below the injection site (regional anesthetic).  · To reduce your risk of infection:  ¨ Your health care team will wash or  sanitize their hands.  ¨ Your skin will be washed with soap.  · Your surgeon will vazquez the area of your toe for removal.  · Your surgeon will make a surgical cut (incision) in your toe.  · The dead tissue and bone will be removed.  · Nerves and vessels will be tied or heated with a special tool to stop bleeding.  · The area will be drained and cleaned.  · If only part of a toe is removed, the remaining part will be covered with a flap of skin.  · The incision will be treated in one of these ways:  ¨ It will be closed with stitches (sutures).  ¨ It will be left open to heal if there is an infection.  · The incision area may be packed with gauze and covered with bandages (dressings).  · Tissue samples may be sent to a lab to be examined under a microscope.  The procedure may vary among health care providers and hospitals.  What happens after the procedure?  · Your blood pressure, heart rate, breathing rate, and blood oxygen level will be monitored often until the medicines you were given have worn off.  · Your foot will be raised up high (elevated) to relieve swelling.  · You will be monitored for pain.  · You will be given pain medicines and antibiotics.  · Your health care provider or physical therapist will help you to move around as soon as possible.  · Do not drive for 24 hours if you received a sedative.  This information is not intended to replace advice given to you by your health care provider. Make sure you discuss any questions you have with your health care provider.  Document Released: 11/28/2016 Document Revised: 08/21/2017 Document Reviewed: 09/10/2016  Elsevier Interactive Patient Education © 2017 [employer]

## 2018-07-06 NOTE — FACE TO FACE
Face to Face Note  -  Durable Medical Equipment    Elsa Barry M.D. - NPI: [id number]  I certify that this patient is under my care and that they had a durable medical equipment(DME)face to face encounter by myself that meets the physician DME face-to-face encounter requirements with this patient on:    Date of encounter:   Patient:                    MRN:                       YOB: 2020  Beni Moore  [id number]  1970     The encounter with the patient was in whole, or in part, for the following medical condition, which is the primary reason for durable medical equipment:  Other - osteomyelitis    I certify that, based on my findings, the following durable medical equipment is medically necessary:  Walkers.    HOME O2 Saturation Measurements:(Values must be present for Home Oxygen orders)         ,     ,         My Clinical findings support the need for the above equipment due to:  Abnormal Gait    Supporting Symptoms: ataxia

## 2018-07-06 NOTE — DISCHARGE PLANNING
Agency/Facility Name: [facility]  Spoke To: Jacqui  Outcome: Per Jacqui they will not be able to see patient until Tuesday or Wednesday .

## 2018-07-06 NOTE — DISCHARGE PLANNING
Agency/Facility Name: [facility]  Outcome: Per verbal request from [facility] referral sent to [facility] at 0840.

## 2018-07-06 NOTE — DISCHARGE PLANNING
Received Choice form at 1540  Agency/Facility Name: [facility]  Referral sent per Choice form at 1550.  Choice obtained by JANICE Langford.

## 2018-07-06 NOTE — FACE TO FACE
Face to Face Supporting Documentation - Home Health    The encounter with this patient was in whole or in part the primary reason for home health admission.    Date of encounter:   Patient:                    MRN:                       YOB: 2020  Beni Moore  [id number]  1970     Home health to see patient for:  Physical Therapy evaluation and treatment    Skilled need for:  New onset of new medical diagnosis: osteomyelitis    Skilled nursing interventions to include:  Comment: Physical therapy    Homebound status evidenced by:  Needs the assistance of another person in order to leave the home. Leaving home requires a considerable and taxing effort. There is a normal inability to leave the home.    Community Physician to provide follow up care: Nolan Buchanan D.O.     Optional Interventions? No      I certify the face to face encounter for this home health care referral meets the CMS requirements and the encounter/clinical assessment with the patient was, in whole, or in part, for the medical condition(s) listed above, which is the primary reason for home health care. Based on my clinical findings: the service(s) are medically necessary, support the need for home health care, and the homebound criteria are met.  I certify that this patient has had a face to face encounter by myself.  Elsa Barry M.D. - NPI: [id number]

## 2018-07-13 ENCOUNTER — OFFICE VISIT (OUTPATIENT)
Dept: WOUND CARE | Facility: MEDICAL CENTER | Age: 48
End: 2018-07-13
Attending: NURSE PRACTITIONER
Payer: COMMERCIAL

## 2018-07-13 DIAGNOSIS — E11.9 DIABETES MELLITUS WITH INSULIN THERAPY (HCC): ICD-10-CM

## 2018-07-13 DIAGNOSIS — Z79.4 DIABETES MELLITUS WITH INSULIN THERAPY (HCC): ICD-10-CM

## 2018-07-13 DIAGNOSIS — Z89.431 S/P TRANSMETATARSAL AMPUTATION OF FOOT, RIGHT (HCC): ICD-10-CM

## 2018-07-13 PROCEDURE — 99213 OFFICE O/P EST LOW 20 MIN: CPT

## 2018-07-13 PROCEDURE — 99212 OFFICE O/P EST SF 10 MIN: CPT | Performed by: NURSE PRACTITIONER

## 2018-07-13 NOTE — WOUND TEAM
Pt seen during ortho rounds with LPS team for right TMA wounds.  Measurements(cm): approximated. Following physician assessment, pt wound was evaluated and dressed with Non-adhesive foam and secured with hypafix tape. Tubigrib size E.

## 2018-07-14 NOTE — OP REPORT
DATE OF SERVICE:  07/02/2018    PREOPERATIVE DIAGNOSES:  1.  Diabetes.  2.  Open first ray amputation.  3.  Neuropathy.    POSTOPERATIVE DIAGNOSES:  1.  Diabetes.  2.  Open first ray amputation.  3.  Neuropathy.    PROCEDURES:  1.  Right gastrocsoleus recession  2.  Right transmetatarsal amputation.  3.  Right foot irrigation and debridement, multiple compartments.    SURGEON:  Ravi Bernardo MD    FIRST ASSISTANT:  Libby Richardson MD    SECOND ASSISTANT:  Alondra Rodriguez.    ANESTHESIA:  General endotracheal with popliteal block per my request for   postoperative pain management.    ESTIMATED BLOOD LOSS:  None.    COMPLICATIONS:  None.    POSTOPERATIVE PLAN:  1.  Nonweightbearing.  2.  Antibiotics per infectious disease.  3.  No further wound care needed at this time.    INDICATIONS:  Please see H and P.    PROCEDURE IN DETAIL:  The patient was brought into the operating room and   underwent general endotracheal anesthesia without complications.  His right   lower extremity was prepped and draped in standard fashion.  Positive site   verification confirmed his right lower extremity as well as procedure and   confirmation that he received preoperative antibiotics.  Esmarch was used to   exsanguinate his foot and ankle and leg tourniquet was inflated to 250 mmHg.    A posteromedial incision was made at the level of musculotendinous junction of   the gastroc.  It was brought through the crural fascia exposing the gastroc   tendon.  Under direct visualization from lateral to medial, the gastroc tendon   was released preserving the underlying soleus fascia.  Once this was   completed, there was much improvement in his dorsiflexion of his foot.  The   wound was irrigated with copious irrigation, was closed in layered fashion   using 3-0 Vicryl and 3-0 nylon.    Using a #15 blade, sharp full thickness dissection was made at the base of the   toes just over the metatarsal heads, dorsal and plantar.  This was brought    down to the level of the metatarsals, which were then elevated up to the level   of bone to keep full thickness flaps.  Microsagittal saw was used to remove   all the 2nd through 5th metatarsals with a plantar bevel.  The areas were   smoothed.  Next, a #15 blade was then used to sharply excise the whole area   around his recent and previous first ray amputation.  There is necrotic tissue   around the base as well as some gangrenous tissue and this was all sharply   cut back to and down to good quality bone.  The tissue was necrotic.  The   measurement of the wound was approximately 4x8 cm.  A segment of the first   metatarsal base was sent off as a margin.  A thorough irrigation was   performed.  Tourniquet was released, hemostasis was obtained.  Using a   rotational type flap coverage to the plantar and dorsal surfaces were   carefully trimmed and rotated to allow for complete closure of the wound with   relatively little tension.  The wound was then meticulously closed in layered   fashion using 3-0 Vicryl and 3-0 nylon.  Sterile dressings were applied.    Tourniquet was released.  All toes were pink.  His foot was pink.  He was   placed into a posterior tong splint.  He was transferred to the recovery room   in good condition.    Utilization of Dr. Richardson was necessary for patient positioning, holding,   retracting, wound closure, dressing placement and splint placement.  He was   present throughout the entire procedure.       ____________________________________     MD MATTY BARNES / KATARZYNA    DD:  07/02/2018 15:19:58  DT:  07/02/2018 16:05:27    D#:  [id number]  Job#:  [id number]    cc: [facility]

## 2018-07-15 NOTE — PROGRESS NOTES
LIMB PRESERVATION SERVICE ROUNDS    Patient seen in collaboration with interdisciplinary team during LPS rounds in wound clinic for R TMA. Pt sp R TMA and R GSR by Dr. Bernardo on 6/30.     Patient states blood sugar was 139 this am. He has Imani BLANCO for wound care and PT. Tolerating IV abx followed by ID    OBJECTIVE FINDINGS:     Labs/diagnostic reviewed: a1c 13.6% 6/30/18    R TMA: incision approximated with sutures, slight incisional necrosis. No drainage. Dorsal middle incision approximated, skin intact  R GSR: sutures approximated    PROCEDURE   Wound care completed by  Kallie Temple  RN    PLAN:   Wound Care: Imani BLANCO to perform drsg changes 2x/wk. Adhesive foam to GSR incision. Non ad foam, hypafix tape, tubigrip to R TMA  Imaging: n/a  Offloading: RLE HWB in offloading boot continuously. Ok to perform ankle ROM with PT  Antibiotics: continue IV abx per ID 8/13  Surgery: n/a  Referral: follow up at [facility] with Dr. Bernardo for suture removal week of 7/23      LPS Follow up: n/a

## 2018-07-18 ENCOUNTER — OFFICE VISIT (OUTPATIENT)
Dept: INFECTIOUS DISEASES | Facility: MEDICAL CENTER | Age: 48
End: 2018-07-18
Payer: COMMERCIAL

## 2018-07-18 VITALS
BODY MASS INDEX: 28.88 KG/M2 | HEIGHT: 72 IN | TEMPERATURE: 98.7 F | SYSTOLIC BLOOD PRESSURE: 110 MMHG | HEART RATE: 98 BPM | OXYGEN SATURATION: 97 % | DIASTOLIC BLOOD PRESSURE: 60 MMHG | WEIGHT: 213.2 LBS

## 2018-07-18 DIAGNOSIS — M86.271 SUBACUTE OSTEOMYELITIS OF RIGHT FOOT (HCC): ICD-10-CM

## 2018-07-18 DIAGNOSIS — E11.65 TYPE 2 DIABETES MELLITUS WITH HYPERGLYCEMIA, WITHOUT LONG-TERM CURRENT USE OF INSULIN (HCC): ICD-10-CM

## 2018-07-18 DIAGNOSIS — A49.01 MSSA (METHICILLIN SUSCEPTIBLE STAPHYLOCOCCUS AUREUS) INFECTION: ICD-10-CM

## 2018-07-18 PROCEDURE — 99214 OFFICE O/P EST MOD 30 MIN: CPT | Performed by: NURSE PRACTITIONER

## 2018-07-18 RX ORDER — GLIMEPIRIDE 4 MG/1
4 TABLET ORAL DAILY
Refills: 0 | COMMUNITY
Start: 2018-07-16 | End: 2019-11-20 | Stop reason: SDUPTHER

## 2018-07-18 RX ORDER — DULAGLUTIDE 1.5 MG/.5ML
1.5 INJECTION, SOLUTION SUBCUTANEOUS
COMMUNITY
Start: 2018-07-12 | End: 2019-11-20 | Stop reason: SDUPTHER

## 2018-07-18 NOTE — PROGRESS NOTES
Infectious Disease Clinic    Subjective:     Chief Complaint   Patient presents with   • Hospital Follow-up     Diabetic foot infection/osteomyelitis     Interval History: 47 y.o. white male with history of noninsulin-dependent diabetes.  Approximately 2 weeks prior to admission he had injured his right toe, noting worsening pain and erythema.  He was on vacation in [place], had gone swimming and noticed that his toe seemed to be getting actively worse.  He went to [facility], had an MRI, which showed osteomyelitis and he underwent amputation of the first digit.  He was given oral Bactrim postoperatively; unfortunately, it did not sound like he received proper wound care with wet to dry dressings.  Hospitalized from 6/30-7/6/18, after noticing at home he noticed that the amputation site was deteriorating.  Underwent Right TMA with I&D and GSR on 7/2 with Dr. Bernardo.  OR cx +candida albicans.  Cx from [facility] on 6/29 +MSSA.  Discharged home on IV ertapenem through 8/13/18.    Hospital records reviewed    Today, 7/18/2018: Presents tolerating IV ertapenem without issue.  Denies feeling generally ill, fevers/chills, general malaise, headache, n/v/d, abdominal pain, chest pain or shortness of breath.  Follow-up with Dr. Bernardo from [facility] tomorrow for suture removal.  Being followed by [facility] 2 times a week for wound care.  He has noted increased drainage from sites since walking in offloading boot, denies odor, denies increase in redness or swelling.  Denies pain.  BS averaging 80-1 teens.    ROS  As documented above in my HPI.    Past Medical History:   Diagnosis Date   • Diabetes (HCC)        Social History   Substance Use Topics   • Smoking status: Never Smoker   • Smokeless tobacco: Never Used   • Alcohol use No       Allergies: Patient has no known allergies.    Pt's medication and problem list reviewed.     Objective:     PE:  /60   Pulse 98   Temp 37.1 °C (98.7 °F)   Ht 1.829 m (6')    Wt 96.7 kg (213 lb 3.2 oz)   SpO2 97%   BMI 28.92 kg/m²     Vital signs reviewed    Constitutional: Appears well-developed and well-nourished. No acute distress.  Speech fluent.    Eyes: Conjunctivae normal and EOM are normal. Pupils are equal, round, and reactive to light.   Neck: Trachea midline. Normal range of motion. No JVD.  Cardiovascular: Normal rate, regular rhythm, normal heart sounds and faint RLE distal pulse. No murmur, gallop, or friction rub. Trace to +1 RLE edema.  Respiratory: No respiratory distress, unlabored respiratory effort.  Lungs clear to auscultation bilaterally. No wheezes or rales.   Abdomen: Soft, non tender, non-distended. BS + x 4. No masses.   Musculoskeletal: Fairly steady gait, knee scooter for ambulatory assistance.  Limited RLE range of motion, walking boot in place.    Right GSR-sutures in place, no active drainage, no erythema, no maceration, nontender to palpitation.    Right TMA-sutures in place, moderate serosanguineous drainage without odor, moderate maceration to plantar surface of surgical incision, skin peeling to dorsum, scabbing to medial incision site, mild erythema along surgical incision, nontender to palpitation.  RUE PICC- CDI, non tender, no erythema.  Skin: Warm and dry. No visible rashes or lesions.  Neurological: No cranial nerve deficit. Coordination normal.  Decreased RLE sensation.  Psychiatric: Alert and oriented to person, place, and time. Normal mood, calm affect.  Normal behavior and judgment.     Labs from [facility] on 7/16/18:  WBC 6.1  Hemoglobin 12.1  Hematocrit 37.0  Platelet 360  Glucose 178  BUN 15  Creatinine 0.80  Sodium 136  Potassium 4.2  AST 16  ALT 12  Alk phos 65    Assessment and Plan:   The following treatment plan was discussed with patient at length:    1. Subacute osteomyelitis of right foot (HCC)      -Continue IV ertapenem through 8/13/18.  -Follow-up with Dr. Bernardo for suture removal as directed.  -Continue wound care as directed.   [facility] to give [place] home health directions for wound care based off of level of maceration.   2. MSSA (methicillin susceptible Staphylococcus aureus) infection      As above.   3. Type 2 diabetes mellitus with hyperglycemia, without long-term current use of insulin ([facility])      HgA1C 13.6% on 6/30/18.  Continue to monitor blood sugars closely as DM will impair wound healing and can worsen infection.  Discussed diabetic foot care.  Advised to not go barefoot, check feet everyday and to check the inside of shoes before putting them on. Recommended pt to call the clinic immediately with any new and/or worsening foot injuries and/or signs of infection.     Follow up: 3 weeks, RTC sooner if needed. FU with PCP for ongoing chronic medical conditions.     SIERRA Limon.    Case discussed with Dr. Bernardo from [facility] and ELVIE Medley from [facility].       Please note that this dictation was created using voice recognition software. I have  worked with technical experts from [employer]  [facility] to optimize the interface.  I have made every reasonable attempt to correct obvious errors, but there may be errors of grammar and possibly content that I did not discover before finalizing the note.

## 2018-08-13 ENCOUNTER — OFFICE VISIT (OUTPATIENT)
Dept: INFECTIOUS DISEASES | Facility: MEDICAL CENTER | Age: 48
End: 2018-08-13
Payer: COMMERCIAL

## 2018-08-13 VITALS
SYSTOLIC BLOOD PRESSURE: 120 MMHG | HEART RATE: 89 BPM | DIASTOLIC BLOOD PRESSURE: 70 MMHG | HEIGHT: 72 IN | OXYGEN SATURATION: 98 % | TEMPERATURE: 98.4 F

## 2018-08-13 DIAGNOSIS — A49.01 MSSA (METHICILLIN SUSCEPTIBLE STAPHYLOCOCCUS AUREUS) INFECTION: ICD-10-CM

## 2018-08-13 DIAGNOSIS — M86.271 SUBACUTE OSTEOMYELITIS OF RIGHT FOOT (HCC): ICD-10-CM

## 2018-08-13 DIAGNOSIS — E11.65 TYPE 2 DIABETES MELLITUS WITH HYPERGLYCEMIA, WITHOUT LONG-TERM CURRENT USE OF INSULIN (HCC): ICD-10-CM

## 2018-08-13 PROCEDURE — 99214 OFFICE O/P EST MOD 30 MIN: CPT | Performed by: NURSE PRACTITIONER

## 2018-08-13 RX ORDER — SULFAMETHOXAZOLE AND TRIMETHOPRIM 800; 160 MG/1; MG/1
1 TABLET ORAL 2 TIMES DAILY
Qty: 60 TAB | Refills: 0 | Status: SHIPPED | OUTPATIENT
Start: 2018-08-13 | End: 2018-09-12

## 2018-08-13 NOTE — PROGRESS NOTES
Infectious Disease Clinic    Subjective:     Chief Complaint   Patient presents with   • Follow-Up     Subacute osteomyelitis of right foot      Interval History: 47 y.o. white male with history of noninsulin-dependent diabetes.  Approximately 2 weeks prior to admission he had injured his right toe, noting worsening pain and erythema.  He was on vacation in [place], had gone swimming and noticed that his toe seemed to be getting actively worse.  He went to [facility], had an MRI, which showed osteomyelitis and he underwent amputation of the first digit.  He was given oral Bactrim postoperatively; unfortunately, it did not sound like he received proper wound care with wet to dry dressings.  Hospitalized from 6/30-7/6/18, after noticing at home he noticed that the amputation site was deteriorating.  Underwent Right TMA with I&D and GSR on 7/2 with Dr. Bernardo.  OR cx +candida albicans.  Cx from [facility] on 6/29 +MSSA.  Discharged home on IV ertapenem through 8/13/18.    7/18/2018: Seen by ELVIE Zacarias.  Being followed by [facility] 2 times a week for wound care.  Continue IV ertapenem through 8/13/18.    Today, 8/13/2018: Finish last dose of IV ertapenem this morning without issue.  Denies feeling generally ill, fevers/chills, general malaise, headache, n/v/d, abdominal pain, chest pain or shortness of breath.  Was seen by ELVIE Whittington with [facility] last week, there is concern for part of the surgical TMA site, it is slow to heal.  Patient being referred [facility] wound [facility], will first appointment this Wednesday.  States there has been very minimal yellowish drainage without odor.  Patient and home health have been changing dressings approximately 3 times a week.  Denies any pain.  BS has averaged 137 over the past 40 days.  Patient did note that he has [employer] tag in [place] that starts on 8/18, he plans to go out hunting soon, but he is not sure when or how the walking boot on at this  time.    ROS  As documented above in my HPI.    Past Medical History:   Diagnosis Date   • Diabetes (HCC)        Social History   Substance Use Topics   • Smoking status: Never Smoker   • Smokeless tobacco: Never Used   • Alcohol use No       Allergies: Patient has no known allergies.    Pt's medication and problem list reviewed.     Objective:     PE:  /70   Pulse 89   Temp 36.9 °C (98.4 °F)   Ht 1.829 m (6')   SpO2 98%     Vital signs reviewed    Constitutional: Appears well-developed and well-nourished. No acute distress.  Speech fluent.    Eyes: Conjunctivae normal and EOM are normal. Pupils are equal, round, and reactive to light.   Neck: Trachea midline. Normal range of motion. No JVD.  Neck supple.  Cardiovascular: Normal rate, regular rhythm, normal heart sounds and intact RLE distal pulse. No murmur, gallop, or friction rub. Trace RLE edema.  Respiratory: No respiratory distress, unlabored respiratory effort.  Lungs clear to auscultation bilaterally. No wheezes.   Abdomen: Soft, non tender, non-distended. BS + x 4. No masses.   Musculoskeletal: Fairly steady gait, knee scooter for ambulatory assistance.  Limited RLE range of motion, walking boot in place.    Right GSR-Steri-Strips in place, no active drainage, no erythema, nontender palpitation.  Right TMA-area of dehiscence to mid TMA site that measures 4 x 2.2 x 0.2 cm, wound bed pale pink, moderate serous drainage with mild odor that dissipated the longer the wound was open, moderate maceration to wound edges, trace erythema to surrounding tissue, nontender to palpitation.  RUE PICC- CDI, non tender, no erythema.  Skin: Warm and dry. No visible rashes or lesions.  Neurological: No cranial nerve deficit. Coordination normal.  Decreased RLE sensation.  Psychiatric: Alert and oriented to person, place, and time. Normal mood, calm affect.  Normal behavior and judgment.     Labs from [facility] on 8/6/18:  WBC 6.4  Hemoglobin 13.0  Hematocrit  39.3  Platelet 254  Glucose 143  BUN 16  Creatinine 0.85  Sodium 139  Potassium 4.4  AST 22  ALT 20  Alk phos 64    Assessment and Plan:   The following treatment plan was discussed with patient at length:    1. Subacute osteomyelitis of right foot (HCC)  sulfamethoxazole-trimethoprim (BACTRIM DS) 800-160 MG tablet    CBC WITH DIFFERENTIAL    COMP METABOLIC PANEL    WESTERGREN SED RATE    -Finished IV ertapenem today.  PICC DC'd in office, tip intact.  Covered with gauze, directed to keep covered for 24 hours.  ER/hematoma warning signs given.  -Start p.o. Bactrim tomorrow, plan for 1-3 months of treatment.  Has tolerated Bactrim in the past.  -CBC, CMP and ESR to be done approximately 10-14 days.  Monitoring for leukocytosis, thrombus cytopenia, hepato-nephrotoxicity and trending inflammatory markers.  -Monitor for s/sx of worsening transitioning from IV to PO abx: increased redness, pain, swelling, drainage, breakdown of surgical site, fevers, chills, general malaise, etc.  Notify ID or go to ER should these s/sx occur.   2. MSSA (methicillin susceptible Staphylococcus aureus) infection  sulfamethoxazole-trimethoprim (BACTRIM DS) 800-160 MG tablet    CBC WITH DIFFERENTIAL    COMP METABOLIC PANEL    WESTERGREN SED RATE    As above.   3. Type 2 diabetes mellitus with hyperglycemia, without long-term current use of insulin ([facility])      HgA1C 13.6% on 6/30/18.  Continue to monitor blood sugars closely as DM will impair wound healing and can worsen      Follow up: 3 weeks, RTC sooner if needed. FU with PCP for ongoing chronic medical conditions.     Miriam Mason, RANDI.P.R.N.       Please note that this dictation was created using voice recognition software. I have  worked with technical experts from [employer] to optimize the interface.  I have made every reasonable attempt to correct obvious errors, but there may be errors of grammar and possibly content that I did not discover before finalizing the note.

## 2018-08-15 ENCOUNTER — NON-PROVIDER VISIT (OUTPATIENT)
Dept: WOUND CARE | Facility: MEDICAL CENTER | Age: 48
End: 2018-08-15
Attending: NURSE PRACTITIONER
Payer: COMMERCIAL

## 2018-08-15 PROCEDURE — 97597 DBRDMT OPN WND 1ST 20 CM/<: CPT

## 2018-08-15 PROCEDURE — 99201 HCHG LEVEL I NEW PATIENT: CPT

## 2018-08-15 RX ORDER — LISINOPRIL 40 MG/1
40 TABLET ORAL DAILY
Status: ON HOLD | COMMUNITY
End: 2019-09-27 | Stop reason: SDUPTHER

## 2018-08-15 RX ORDER — INSULIN GLARGINE 100 [IU]/ML
10 INJECTION, SOLUTION SUBCUTANEOUS NIGHTLY
COMMUNITY
End: 2019-11-20 | Stop reason: SDUPTHER

## 2018-08-15 NOTE — CERTIFICATION
Advanced Wound Care  [facility]  [address]  [address]  [place] [address]  [phone] Fax: [phone]      Initial Evaluation  For Certification Period: 8/15/2018-11/5/2018  Start of Care: 8/15/2018          Referring Provider: REGNIA Rai  Primary Physician: Nolan Buchanan D.O.      Consulting Providers: ELVIE Ramírez.        Wound(s): Right TMA  Pharmacy of Choice:        Subjective:        HPI 8/15/2018: Patient is a 47 year old diabetic male with a right TMA wound. He states that he had surgery while on vacation in [facility] to remove his right hallux in late June 2018 due to infection, and was sent home with instructions to go to [facility] ER when he got back to [place]. He was admitted at [facility] from 6/30/2018-7/6/2018 and had surgery with Dr. Bernardo on 7/2/2018 (right gastrocsoleus recession, right transmetatarsal amputation, and I & D of multiple compartments). After discharge, he was seen by home care, but was referred to [facility] for debridement.      Pain: Patient denies pain at wound site, LOPS.           Past Medical History:  Past Medical History:   Diagnosis Date   • Diabetes ([facility])        Current Medications:  Current Outpatient Prescriptions:   •  lisinopril (PRINIVIL, ZESTRIL) 40 MG tablet, Take 40 mg by mouth every day., Disp: , Rfl:   •  insulin glargine (LANTUS) 100 UNIT/ML Solution, Inject 10 Units as instructed every evening., Disp: , Rfl:   •  sulfamethoxazole-trimethoprim (BACTRIM DS) 800-160 MG tablet, Take 1 Tab by mouth 2 times a day for 30 days., Disp: 60 Tab, Rfl: 0  •  glimepiride (AMARYL) 4 MG Tab, Take 4 mg by mouth every day., Disp: , Rfl: 0  •  TRULICITY 1.5 MG/0.5ML Solution Pen-injector, 1.5 mg by Injection route every 7 days., Disp: , Rfl:   •  omeprazole (PRILOSEC) 20 MG delayed-release capsule, Take 20 mg by mouth every day., Disp: , Rfl:     Allergies: No Known Allergies    Past Surgical History:   Past  Surgical History:   Procedure Laterality Date   • TOE AMPUTATION Right 7/2/2018    Procedure: Transmetatarsal amputation;  Surgeon: Ravi Bernardo M.D.;  Location: SURGERY [facility];  Service: Orthopedics   • ACHILLES TENDON REPAIR Right 7/2/2018    Procedure: ACHILLES LENGTHENING;  Surgeon: Ravi Bernardo M.D.;  Location: SURGERY [facility];  Service: Orthopedics   • IRRIGATION & DEBRIDEMENT ORTHO Right 7/2/2018    Procedure: IRRIGATION & DEBRIDEMENT ORTHO;  Surgeon: Ravi Bernardo M.D.;  Location: SURGERY [facility];  Service: Orthopedics   • OTHER     • OTHER ABDOMINAL SURGERY     • OTHER ORTHOPEDIC SURGERY         Social History:   Social History     Social History   • Marital status:      Spouse name: N/A   • Number of children: N/A   • Years of education: N/A     Occupational History   • Not on file.     Social History Main Topics   • Smoking status: Never Smoker   • Smokeless tobacco: Never Used   • Alcohol use No   • Drug use: No   • Sexual activity: Not on file     Other Topics Concern   • Not on file     Social History Narrative   • No narrative on file             Objective:      Tests and Measures:  8/15/2018: Right DP and PT pulses 2+, biphasic by doppler. He has a venous duplex and arterial studies ordered in [facility].    Orthotic, protective, supportive devices: Knee scooter, walking boot for RLE.    Fall Risk Assessment (vazquez all that apply with an X):  Completed 8/15/2018- fall risk.       65 years or older        Fall within the last 2 years   X Uses ambulatory devices  X Loss of protective sensation in feet      Use of prostethic/orthotic    X Presence of lower extremity/foot/toe amputation      Taking medication that increases risk (per facility policy)    Interventions Recommended (if any of the above are selected):   Use of Assistive Device: Knee scooter   Supervision with ambulation: Independent   Assistance with ambulation: Independent   Home safety education:  Educational material provided         Wound Characteristics                                                    Location:  Right TMA   Initial Evaluation  Date: 8/15/2018     Tissue Type and %: 5% moist pink tissue, 95% adherent yellow.   Periwound: Intact   Drainage: JENNIFER, minimal per patient.   Exposed structures JENNIFER due to slough, none visible or palpable.   Wound Edges:   Open   Odor: None   S&S of Infection:   None, patient taking oral Bactrim.   Edema: None   Sensation: LOPS               Measurements:  Right TMA Initial Evaluation  Date: 8/15/2018     Length (cm) 2   Width (cm) 3.9   Depth (cm) 2.2 at deepest palpable point   Area (cm2) 7.8 cm2   Tract/undermine JENNIFER due to slough              Procedures:     Debridement: CSWD with scalpel and forceps to remove ~7 cm2 slough from wound bed.    Cleansed with: No rinse foam cleanser to foot, NS to wound after debridement.                                                                         Periwound protected with: Skin prep   Primary dressing: Aquacel Ag strip into deep area of wound bed, tail left out and folded over main wound bed.   Secondary Dressing: Non-adhesive foam secured with hypafix tape.   Other: Tubigrip D, patient's own walking boot.     Patient Education: Patient educated on plan of care and rationale behind dressing selection. Reviewed the s/s of infection and when to present to the ER (fever, chills, nausea/vomiting, redness that spreads from the wound, sudden increased drainage or pain), leaving dressing clean/dry/intact, when to change the dressing (if it becomes soiled, soaked, or falls off), nutrition for wound healing (increased protein), and diabetic foot care guidelines such as checking feet daily and controlling blood glucose. Patient verbalized understanding to all instructions.     Wound depth is 2.2 cm at deepest palpable point, and could be deeper once slough is removed. Discussed possibility of a wound vac with patient. He states  that he will do whatever it takes to get his wound healed as quickly as possible. Wound vac authorization initiated through [employer]. Patient is scheduled for excisional debridement on 8/30/2018.      Professional Collaboration: Initial evaluation note sent to referring provider via EPIC. Chucky BERMEO regarding wound vac order and plan of care.      Assessment:      Wound etiology: Surgical, infection.    Wound Progress:  Initial Evaluation    Rationale for Treatment: Aquacel Ag to manage bioburden, absorb exudate, and maintain moist wound environment without laterally wicking exudate therefore reducing ravin-wound maceration. Non-adhesive foam to absorb drainage.    Patient tolerance/compliance: Patient tolerated treatment well, agrees with plan of care.    Complicating factors: Type II Diabetes, infection.    Need for ongoing Advanced Wound Care services: Patient requires skilled therapeutic wound care services for product selection, application of product, debridement, close monitoring with clinical assessment to expedite wound healing.         Plan:      Treatment Plan and Recommendations:  Diagnosis/ICD10: S91.331A, E11.9.    Procedures/CPT: Level I New Patient 69337, CSWD [id number]    Frequency: 2x/week      Treatment Goals: STG 2 Weeks  LTG 4 Weeks   Granulation Tissue: 10% 20%   Decrease Necrotic Tissue to: 90% 80%   Wound Phase:  Proliferation Proliferation   Decrease Size by: 10% 20%   Periwound:  Intact Intact   Decrease tracts/undermining by: JENNIFER-slough JENNIFER-slough   Decrease Pain:  LOPS LOPS       At the time of each visit a thorough assessment of the patient is completed to assure the  appropriateness of our plan of care.  The dressings or modalities may need to be adapted   from the original plan to address any significant changes in the wound environment.

## 2018-08-17 ENCOUNTER — APPOINTMENT (OUTPATIENT)
Dept: WOUND CARE | Facility: MEDICAL CENTER | Age: 48
End: 2018-08-17
Attending: NURSE PRACTITIONER
Payer: COMMERCIAL

## 2018-08-20 ENCOUNTER — NON-PROVIDER VISIT (OUTPATIENT)
Dept: WOUND CARE | Facility: MEDICAL CENTER | Age: 48
End: 2018-08-20
Attending: NURSE PRACTITIONER
Payer: COMMERCIAL

## 2018-08-20 PROCEDURE — 97597 DBRDMT OPN WND 1ST 20 CM/<: CPT

## 2018-08-20 NOTE — WOUND TEAM
Advanced Wound Care  [facility]  [address]  [address]  [place] [address]  [phone] Fax: [phone]    Encounter Note  For Certification Period: 8/15/2018 - 11/5/2018  Start of Care: 8/15/2018    Referring Provider: REGINA Rai  Primary Physician: Nolan Buchanan D.O.      Consulting Providers: ELVIE Ramírez.        Wound(s): Right TMA       Subjective:        HPI 8/15/2018: Patient is a 47 year old diabetic male with a right TMA wound. He states that he had surgery while on vacation in [facility] to remove his right hallux in late June 2018 due to infection, and was sent home with instructions to go to [facility] ER when he got back to [place]. He was admitted at [facility] from 6/30/2018-7/6/2018 and had surgery with Dr. Bernardo on 7/2/2018 (right gastrocsoleus recession, right transmetatarsal amputation, and I & D of multiple compartments). After discharge, he was seen by home care, but was referred to [facility] Advanced Wound Care for debridement.      Pain: Patient denies pain at wound site, LOPS.           Past Medical History:  Past Medical History:   Diagnosis Date   • Diabetes (HCC)      Current Medications:  Current Outpatient Prescriptions:   •  lisinopril (PRINIVIL, ZESTRIL) 40 MG tablet, Take 40 mg by mouth every day., Disp: , Rfl:   •  insulin glargine (LANTUS) 100 UNIT/ML Solution, Inject 10 Units as instructed every evening., Disp: , Rfl:   •  sulfamethoxazole-trimethoprim (BACTRIM DS) 800-160 MG tablet, Take 1 Tab by mouth 2 times a day for 30 days., Disp: 60 Tab, Rfl: 0  •  glimepiride (AMARYL) 4 MG Tab, Take 4 mg by mouth every day., Disp: , Rfl: 0  •  TRULICITY 1.5 MG/0.5ML Solution Pen-injector, 1.5 mg by Injection route every 7 days., Disp: , Rfl:   •  omeprazole (PRILOSEC) 20 MG delayed-release capsule, Take 20 mg by mouth every day., Disp: , Rfl:     Allergies: No Known Allergies      Objective:      Tests and Measures:  8/15/2018: Right DP and PT  pulses 2+, biphasic by doppler. He has a venous duplex and arterial studies ordered in [facility].    Orthotic, protective, supportive devices: Knee scooter, walking boot for RLE.    Fall Risk Assessment (vazquez all that apply with an X):  Completed 8/15/2018- fall risk.       65 years or older        Fall within the last 2 years   X Uses ambulatory devices  X Loss of protective sensation in feet      Use of prostethic/orthotic    X Presence of lower extremity/foot/toe amputation      Taking medication that increases risk (per facility policy)    Interventions Recommended (if any of the above are selected):   Use of Assistive Device: Knee scooter   Supervision with ambulation: Independent   Assistance with ambulation: Independent   Home safety education: Educational material provided     Wound Characteristics                                                    Location:  Right TMA Initial Evaluation  Date: 8/15/2018 Encounter Date:  08/20/2018   Tissue Type and %: 5% moist pink tissue, 95% adherent yellow. 95% yellow adherent; 5% moist pink/red   Periwound: Intact Intact   Drainage: JENNIFER, minimal per patient. Moderate ss   Exposed structures JENNIFER due to slough, none visible or palpable. JENNIFER due to slough   Wound Edges:   Open Open   Odor: None None   S&S of Infection:   None, patient taking oral Bactrim. None   Edema: None None   Sensation: LOPS LOPS               Measurements:  Right TMA Initial Evaluation  Date: 8/15/2018 Encounter Date:  08/20/2018  Main / lateral satellite   Length (cm) 2 1.5  /  0.2   Width (cm) 3.9 3.9  /  0.2   Depth (cm) 2.2 at deepest palpable point 1.4  /  <0.1   Area (cm2) 7.8 cm2 5.85 / 0.04 cm2   Tract/undermine JENNIFER due to slough JENNIFER due to slough      Procedures:     Debridement: CSWD with curette to remove ~2 cm2 loose slough from wound bed.     Cleansed with: No rinse foam cleanser to foot, NS to wound after debridement.                                                                          Periwound protected with: Skin prep, zinc barrier paste   Primary dressing: Honey alginate to main wound and satellite wounds secured with steri strips   Secondary Dressing: Plaiin aquacel; non-adhesive foam secured with hypafix tape.   Other: Tubigrip D, patient's own walking boot.     Patient Education: Patient educated on plan of care and rationale behind dressing selection. Discussed that he is scheduled for a provider debridement on 8/30/18 with Chucky BERMEO and what to expect. Reviewed the s/s of infection and when to present to the ER (fever, chills, nausea/vomiting, redness that spreads from the wound, sudden increased drainage or pain), leaving dressing clean/dry/intact, when to change the dressing (if it becomes soiled, soaked, or falls off), nutrition for wound healing (increased protein), and diabetic foot care guidelines such as checking feet daily and controlling blood glucose. Patient verbalized understanding to all instructions.     Professional Collaboration: None today      Assessment:      Wound etiology: Surgical, infection.    Wound Progress:  Slightly smaller measurement today with a large amount of slough still present in wound bed.     Rationale for Treatment: Medihoney alginate to absorb exudate, provide moist wound environment, and facilitate autolytic debridement; plain aquacel for absorption; Non-adhesive foam to absorb drainage; tubigrip for compression.     Patient tolerance/compliance: Patient tolerated treatment well, agrees with plan of care.    Complicating factors: Type II Diabetes, infection.    Need for ongoing Advanced Wound Care services: Patient requires skilled therapeutic wound care services for product selection, application of product, debridement, close monitoring with clinical assessment to expedite wound healing.     Plan:      Treatment Plan and Recommendations:  Diagnosis/ICD10: S91.331A, E11.9.    Procedures/CPT: Level I New Patient 10934, CSWD 04080    Frequency:  2x/week      Treatment Goals: STG 2 Weeks  LTG 4 Weeks   Granulation Tissue: 10% 20%   Decrease Necrotic Tissue to: 90% 80%   Wound Phase:  Proliferation Proliferation   Decrease Size by: 10% 20%   Periwound:  Intact Intact   Decrease tracts/undermining by: JENNIFER-slough JENNIFER-slough   Decrease Pain:  LOPS LOPS       At the time of each visit a thorough assessment of the patient is completed to assure the  appropriateness of our plan of care.  The dressings or modalities may need to be adapted   from the original plan to address any significant changes in the wound environment.

## 2018-08-22 ENCOUNTER — HOSPITAL ENCOUNTER (OUTPATIENT)
Dept: RADIOLOGY | Facility: MEDICAL CENTER | Age: 48
End: 2018-08-22
Attending: NURSE PRACTITIONER
Payer: COMMERCIAL

## 2018-08-22 DIAGNOSIS — S91.331A: ICD-10-CM

## 2018-08-22 DIAGNOSIS — E11.8 TYPE 2 DIABETES MELLITUS WITH COMPLICATION, UNSPECIFIED WHETHER LONG TERM INSULIN USE: ICD-10-CM

## 2018-08-22 DIAGNOSIS — G62.89 OTHER POLYNEUROPATHY: ICD-10-CM

## 2018-08-22 PROCEDURE — 93922 UPR/L XTREMITY ART 2 LEVELS: CPT | Mod: 26 | Performed by: SURGERY

## 2018-08-22 PROCEDURE — 93971 EXTREMITY STUDY: CPT

## 2018-08-22 PROCEDURE — 93922 UPR/L XTREMITY ART 2 LEVELS: CPT

## 2018-08-22 PROCEDURE — 93971 EXTREMITY STUDY: CPT | Mod: 26 | Performed by: SURGERY

## 2018-08-22 NOTE — WOUND TEAM
Patient left a message requesting a call back regarding wound vac therapy. Called patient back and discussed wound vac with patient. He states that he thinks his wound is getting better with his current treatment, and he does not want wound vac therapy after all.

## 2018-08-23 ENCOUNTER — NON-PROVIDER VISIT (OUTPATIENT)
Dept: WOUND CARE | Facility: MEDICAL CENTER | Age: 48
End: 2018-08-23
Attending: NURSE PRACTITIONER
Payer: COMMERCIAL

## 2018-08-23 PROCEDURE — 97597 DBRDMT OPN WND 1ST 20 CM/<: CPT

## 2018-08-23 NOTE — WOUND TEAM
Advanced Wound Care  [facility]  [address]  [address]  [place] [address]  [phone] Fax: [phone]    Encounter Note  For Certification Period: 8/15/2018 - 11/5/2018  Start of Care: 8/15/2018    Referring Provider: REGINA Rai  Primary Physician: Nolan Buchanan D.O.      Consulting Providers: ELVIE Ramírez.        Wound(s): Right TMA       Subjective:        HPI 8/15/2018: Patient is a 47 year old diabetic male with a right TMA wound. He states that he had surgery while on vacation in [facility] to remove his right hallux in late June 2018 due to infection, and was sent home with instructions to go to [facility] ER when he got back to [place]. He was admitted at [facility] from 6/30/2018-7/6/2018 and had surgery with Dr. Bernardo on 7/2/2018 (right gastrocsoleus recession, right transmetatarsal amputation, and I & D of multiple compartments). After discharge, he was seen by home care, but was referred to [facility] Advanced [facility]ound Care for debridement.      Pain: Patient denies pain at wound site, LOPS.           Past Medical History:  Past Medical History:   Diagnosis Date   • Diabetes (HCC)      Current Medications:  Current Outpatient Prescriptions:   •  lisinopril (PRINIVIL, ZESTRIL) 40 MG tablet, Take 40 mg by mouth every day., Disp: , Rfl:   •  insulin glargine (LANTUS) 100 UNIT/ML Solution, Inject 10 Units as instructed every evening., Disp: , Rfl:   •  sulfamethoxazole-trimethoprim (BACTRIM DS) 800-160 MG tablet, Take 1 Tab by mouth 2 times a day for 30 days., Disp: 60 Tab, Rfl: 0  •  glimepiride (AMARYL) 4 MG Tab, Take 4 mg by mouth every day., Disp: , Rfl: 0  •  TRULICITY 1.5 MG/0.5ML Solution Pen-injector, 1.5 mg by Injection route every 7 days., Disp: , Rfl:   •  omeprazole (PRILOSEC) 20 MG delayed-release capsule, Take 20 mg by mouth every day., Disp: , Rfl:     Allergies: No Known Allergies      Objective:      Tests and Measures:  8/15/2018: Right DP and PT  pulses 2+, biphasic by doppler. He has a venous duplex and arterial studies ordered in [facility].    Orthotic, protective, supportive devices: Knee scooter, walking boot for RLE.    Fall Risk Assessment (vazquez all that apply with an X): Completed 8/15/2018- fall risk.       65 years or older        Fall within the last 2 years   X Uses ambulatory devices  X Loss of protective sensation in feet      Use of prostethic/orthotic    X Presence of lower extremity/foot/toe amputation      Taking medication that increases risk (per facility policy)    Interventions Recommended (if any of the above are selected):   Use of Assistive Device: Knee scooter   Supervision with ambulation: Independent   Assistance with ambulation: Independent   Home safety education: Educational material provided     Wound Characteristics                                                    Location:  Right [facility] Initial Evaluation  Date: 8/15/2018 Encounter Date:  08/23/2018   Tissue Type and %: 5% moist pink tissue, 95% adherent yellow. 5% moist pink tissue, 95% yellow adherent.   Periwound: Intact Intact   Drainage: JENNIFER, minimal per patient. Moderate serosanguinous   Exposed structures JENNIFER due to slough, none visible or palpable. JENNIFER due to slough   Wound Edges:   Open Open   Odor: None None   S&S of Infection:   None, patient taking oral Bactrim. None, patient taking oral Bactrim.   Edema: None None   Sensation: LOPS LOPS               Measurements:  Right [facility] Initial Evaluation  Date: 8/15/2018 Encounter Date:  08/20/2018  Main / lateral satellite   Length (cm) 2 1.5  /  0.2   Width (cm) 3.9 3.9  /  0.2   Depth (cm) 2.2 at deepest palpable point 1.4  /  <0.1   Area (cm2) 7.8 cm2 5.85 / 0.04 cm2   Tract/undermine JENNIFER due to slough JENNIFER due to slough      Procedures:     Debridement: CSWD with scalpel and forceps to remove ~5 cm2 slough from wound bed.   Cleansed with: No rinse foam cleanser to foot, NS to wound after debridement.                                                                          Periwound protected with: Skin prep   Primary dressing: Medihoney alginate to main wound secured with steri-strips, Aquacel Ag to satellite wound.   Secondary Dressing: Plain Aquacel, non-adhesive foam secured with hypafix tape.   Other: Tubigrip D, patient's own walking boot.     Patient Education: Wound progress and plan of care discussed. Patient completed his arterial studies yesterday, results are not yet finalized. He has decided that he does not want wound vac therapy. Notified [employer] via [employer]. Advised patient to keep dressing clean/dry/intact, but to change the outer dressing if it becomes soaked or falls off. He verbalized understanding of instructions.    Professional Collaboration: None today      Assessment:      Wound etiology: Surgical, infection.    Wound Progress: No measurements today.    Rationale for Treatment: Medihoney alginate to provide a moist wound environment, and facilitate autolytic debridement. Aquacel Ag to manage bioburden, absorb exudate, and maintain moist wound environment without laterally wicking exudate therefore reducing ravin-wound maceration. Plain Aquacel and non-adhesive foam to absorb drainage. Tubigrip for compression and to control edema.    Patient tolerance/compliance: Patient tolerated treatment well, agrees with plan of care.    Complicating factors: Type II Diabetes, infection.    Need for ongoing Advanced Wound Care services: Patient requires skilled therapeutic wound care services for product selection, application of product, debridement, close monitoring with clinical assessment to expedite wound healing.     Plan:      Treatment Plan and Recommendations:  Diagnosis/ICD10: S91.331A, E11.9.    Procedures/CPT: CSWD 29516    Frequency: 2x/week      Treatment Goals: STG 2 Weeks  LTG 4 Weeks   Granulation Tissue: 10% 20%   Decrease Necrotic Tissue to: 90% 80%   Wound Phase:  Proliferation Proliferation   Decrease  Size by: 10% 20%   Periwound:  Intact Intact   Decrease tracts/undermining by: JENNIFER-slough JENNIFER-slough   Decrease Pain:  LOPS LOPS       At the time of each visit a thorough assessment of the patient is completed to assure the  appropriateness of our plan of care.  The dressings or modalities may need to be adapted   from the original plan to address any significant changes in the wound environment.

## 2018-08-27 ENCOUNTER — NON-PROVIDER VISIT (OUTPATIENT)
Dept: WOUND CARE | Facility: MEDICAL CENTER | Age: 48
End: 2018-08-27
Attending: NURSE PRACTITIONER
Payer: COMMERCIAL

## 2018-08-27 PROCEDURE — 97597 DBRDMT OPN WND 1ST 20 CM/<: CPT

## 2018-08-27 NOTE — WOUND TEAM
Advanced Wound Care  [facility]  [address]  [address]  [place] [address]  [phone] Fax: [phone]    Encounter Note  For Certification Period: 8/15/2018 - 11/5/2018  Start of Care: 8/15/2018    Referring Provider: REGINA Rai  Primary Physician: Nolan Buchanan D.O.      Consulting Providers: ELVIE Ramírez.        Wound(s): Right TMA       Subjective:        HPI 8/15/2018: Patient is a 47 year old diabetic male with a right TMA wound. He states that he had surgery while on vacation in [facility] to remove his right hallux in late June 2018 due to infection, and was sent home with instructions to go to [facility] ER when he got back to [place]. He was admitted at [facility] from 6/30/2018-7/6/2018 and had surgery with Dr. Bernardo on 7/2/2018 (right gastrocsoleus recession, right transmetatarsal amputation, and I & D of multiple compartments). After discharge, he was seen by home care, but was referred to [facility] Advanced [facility] Care for debridement.      Pain: Patient denies pain at wound site, LOPS.           Past Medical History:  Past Medical History:   Diagnosis Date   • Diabetes (HCC)      Current Medications:  Current Outpatient Prescriptions:   •  lisinopril (PRINIVIL, ZESTRIL) 40 MG tablet, Take 40 mg by mouth every day., Disp: , Rfl:   •  insulin glargine (LANTUS) 100 UNIT/ML Solution, Inject 10 Units as instructed every evening., Disp: , Rfl:   •  sulfamethoxazole-trimethoprim (BACTRIM DS) 800-160 MG tablet, Take 1 Tab by mouth 2 times a day for 30 days., Disp: 60 Tab, Rfl: 0  •  glimepiride (AMARYL) 4 MG Tab, Take 4 mg by mouth every day., Disp: , Rfl: 0  •  TRULICITY 1.5 MG/0.5ML Solution Pen-injector, 1.5 mg by Injection route every 7 days., Disp: , Rfl:   •  omeprazole (PRILOSEC) 20 MG delayed-release capsule, Take 20 mg by mouth every day., Disp: , Rfl:     Allergies: No Known Allergies      Objective:      Tests and Measures:  8/15/2018: Right DP and PT  pulses 2+, biphasic by doppler. He has a venous duplex and arterial studies ordered in [facility].    Orthotic, protective, supportive devices: Knee scooter, walking boot for RLE.    Fall Risk Assessment (vazquez all that apply with an X): Completed 8/15/2018- fall risk.       65 years or older        Fall within the last 2 years   X Uses ambulatory devices  X Loss of protective sensation in feet      Use of prostethic/orthotic    X Presence of lower extremity/foot/toe amputation      Taking medication that increases risk (per facility policy)    Interventions Recommended (if any of the above are selected):   Use of Assistive Device: Knee scooter   Supervision with ambulation: Independent   Assistance with ambulation: Independent   Home safety education: Educational material provided     Wound Characteristics                                                    Location:  Right [facility] Initial Evaluation  Date: 8/15/2018 Encounter Date:  08/27/2018   Tissue Type and %: 5% moist pink tissue, 95% adherent yellow. 10% moist red buds tissue, 90% yellow adherent.   Periwound: Intact Scant erythema, edema   Drainage: JENNIFER, minimal per patient. Moderate serosanguinous   Exposed structures JENNIFER due to slough, none visible or palpable. Bone palpated   Wound Edges:   Open Open   Odor: None None   S&S of Infection:   None, patient taking oral Bactrim. None, patient taking oral Bactrim.   Edema: None localized   Sensation: LOPS LOPS               Measurements:  Right [facility] Initial Evaluation  Date: 8/15/2018 Encounter Date:  08/27/2018  Main / lateral satellite   Length (cm) 2 1.9  /  0.2   Width (cm) 3.9 4.0  /  0.2   Depth (cm) 2.2 at deepest palpable point 1.4  /  <0.1   Area (cm2) 7.8 cm2 7.6 / 0.04 cm2   Tract/undermine JENNIFER due to slough JENNIFER due to slough            Procedures:     Debridement: CSWD with curette to remove ~3 cm2 slough from wound bed.   Cleansed with: No rinse foam cleanser to foot & leg, NS to wound after debridement.                                                                          Periwound protected with: Skin prep   Primary dressing: Aquacel Ag secured with steristrips   Secondary Dressing: adhesive foam secured with medical adhesive spray & hypafix tape.   Other: Tubigrip D single layer, patient's own walking boot.     Patient Education: Wound progress and plan of care discussed. Discussed NPWT; patient would like to try as unhappy at missing hunting season and want to have healing expedited as fast as possible. Patient states his blood sugars have been averaging 120s. Advised patient to keep dressing clean/dry/intact, but to change the outer dressing if it becomes soaked or falls off. Can wash leg & apply lotion. He & wife verbalized understanding of instructions.    Professional Collaboration: called Mckenna with [facility] to have NPWT arrive by Thursday to apply after debridment      Assessment:      Wound etiology: Surgical, infection.    Wound Progress: similar size    Rationale for Treatment: Medihoney alginate to provide a moist wound environment, and facilitate autolytic debridement. Aquacel Ag to manage bioburden, absorb exudate, and maintain moist wound environment without laterally wicking exudate therefore reducing ravin-wound maceration. Plain Aquacel and non-adhesive foam to absorb drainage. Tubigrip for compression and to control edema.    Patient tolerance/compliance: Patient tolerated treatment well, agrees with plan of care.    Complicating factors: Type II Diabetes, infection.    Need for ongoing Advanced Wound Care services: Patient requires skilled therapeutic wound care services for product selection, application of product, debridement, close monitoring with clinical assessment to expedite wound healing.     Plan:      Treatment Plan and Recommendations:  Diagnosis/ICD10: S91.331A, E11.9.    Procedures/CPT: CSWD 68704    Frequency: 3x/week for NPWT      Treatment Goals: STG 2 Weeks  LTG 4 Weeks   Granulation  Tissue: 10% 20%   Decrease Necrotic Tissue to: 90% 80%   Wound Phase:  Proliferation Proliferation   Decrease Size by: 10% 20%   Periwound:  Intact Intact   Decrease tracts/undermining by: JENNIFER-slough JENNIFER-slough   Decrease Pain:  LOPS LOPS       At the time of each visit a thorough assessment of the patient is completed to assure the  appropriateness of our plan of care.  The dressings or modalities may need to be adapted   from the original plan to address any significant changes in the wound environment.

## 2018-08-29 ENCOUNTER — APPOINTMENT (OUTPATIENT)
Dept: WOUND CARE | Facility: MEDICAL CENTER | Age: 48
End: 2018-08-29
Attending: NURSE PRACTITIONER
Payer: COMMERCIAL

## 2018-08-30 ENCOUNTER — OFFICE VISIT (OUTPATIENT)
Dept: WOUND CARE | Facility: MEDICAL CENTER | Age: 48
End: 2018-08-30
Attending: NURSE PRACTITIONER
Payer: COMMERCIAL

## 2018-08-30 DIAGNOSIS — Z89.431 S/P TRANSMETATARSAL AMPUTATION OF FOOT, RIGHT (HCC): ICD-10-CM

## 2018-08-30 DIAGNOSIS — M86.271 SUBACUTE OSTEOMYELITIS OF RIGHT FOOT (HCC): ICD-10-CM

## 2018-08-30 DIAGNOSIS — Z79.4 DIABETES MELLITUS WITH INSULIN THERAPY (HCC): ICD-10-CM

## 2018-08-30 DIAGNOSIS — E11.9 DIABETES MELLITUS WITH INSULIN THERAPY (HCC): ICD-10-CM

## 2018-08-30 PROCEDURE — 11043 DBRDMT MUSC&/FSCA 1ST 20/<: CPT | Performed by: NURSE PRACTITIONER

## 2018-08-30 PROCEDURE — 97605 NEG PRS WND THER DME<=50SQCM: CPT

## 2018-08-30 PROCEDURE — 11043 DBRDMT MUSC&/FSCA 1ST 20/<: CPT

## 2018-08-30 NOTE — WOUND TEAM
Advanced Wound Care  [facility]  [address]  [address]  [place] [address]  [phone] Fax: [phone]    Encounter Note  For Certification Period: 8/15/2018 - 11/5/2018  Start of Care: 8/15/2018    Referring Provider: REGINA Rai  Primary Physician: Nolan Buchanan D.O.      Consulting Providers: ELVIE Ramírez.        Wound(s): Right TMA       Subjective:        HPI 8/15/2018: Patient is a 47 year old diabetic male with a right TMA wound. He states that he had surgery while on vacation in [facility] to remove his right hallux in late June 2018 due to infection, and was sent home with instructions to go to [facility] ER when he got back to [place]. He was admitted at [facility] from 6/30/2018-7/6/2018 and had surgery with Dr. Bernardo on 7/2/2018 (right gastrocsoleus recession, right transmetatarsal amputation, and I & D of multiple compartments). After discharge, he was seen by home care, but was referred to [facility]ound [facility] for debridement.      Pain: Patient denies pain at wound site, LOPS.           Past Medical History:  Past Medical History:   Diagnosis Date   • Diabetes (HCC)      Current Medications:  Current Outpatient Prescriptions:   •  lisinopril (PRINIVIL, ZESTRIL) 40 MG tablet, Take 40 mg by mouth every day., Disp: , Rfl:   •  insulin glargine (LANTUS) 100 UNIT/ML Solution, Inject 10 Units as instructed every evening., Disp: , Rfl:   •  sulfamethoxazole-trimethoprim (BACTRIM DS) 800-160 MG tablet, Take 1 Tab by mouth 2 times a day for 30 days., Disp: 60 Tab, Rfl: 0  •  glimepiride (AMARYL) 4 MG Tab, Take 4 mg by mouth every day., Disp: , Rfl: 0  •  TRULICITY 1.5 MG/0.5ML Solution Pen-injector, 1.5 mg by Injection route every 7 days., Disp: , Rfl:   •  omeprazole (PRILOSEC) 20 MG delayed-release capsule, Take 20 mg by mouth every day., Disp: , Rfl:     Allergies: No Known Allergies      Objective:      Tests and Measures:  8/15/2018: Right DP and PT  pulses 2+, biphasic by doppler. He has a venous duplex and arterial studies ordered in [facility].    Orthotic, protective, supportive devices: Knee scooter, walking boot for RLE.    Fall Risk Assessment (vazquez all that apply with an X): Completed 8/15/2018- fall risk.       65 years or older        Fall within the last 2 years   X Uses ambulatory devices  X Loss of protective sensation in feet      Use of prostethic/orthotic    X Presence of lower extremity/foot/toe amputation      Taking medication that increases risk (per facility policy)    Interventions Recommended (if any of the above are selected):   Use of Assistive Device: Knee scooter   Supervision with ambulation: Independent   Assistance with ambulation: Independent   Home safety education: Educational material provided     Wound Characteristics                                                    Location:  Right [facility] Initial Evaluation  Date: 8/15/2018 Encounter Date:  08/30/2018   Tissue Type and %: 5% moist pink tissue, 95% adherent yellow. 80% moist pink, 20% mixed slough ligament fascia   Periwound: Intact Scant erythema, edema   Drainage: JENNIFER, minimal per patient. Moderate serosanguinous   Exposed structures JENNIFER due to slough, none visible or palpable. Bone palpated, fascia   Wound Edges:   Open Open   Odor: None None   S&S of Infection:   None, patient taking oral Bactrim. None, patient taking oral Bactrim.   Edema: None localized   Sensation: LOPS LOPS               Measurements:  Right [facility] Initial Evaluation  Date: 8/15/2018 Encounter Date:  08/30/2018  Main / lateral satellite   Length (cm) 2 1.8  /  0.2   Width (cm) 3.9 2.8  /  0.2   Depth (cm) 2.2 at deepest palpable point 2.0  /  <0.1   Area (cm2) 7.8 cm2 5.04 / 0.04 cm2   Tract/undermine JENNIFER due to slough             Procedures:     Debridement: Excisional debridement by Linda BERMEO, see her note   Cleansed with: No rinse foam cleanser to foot & leg, NS to wound after  debridement   Periwound protected with: Skin prep. drape   Primary dressin piece of black foam in the wound bed, 1 piece of black foam for tract pad and boaster for lateral side of wound bed.   Secondary Dressing: drape to secure, started NPWT at 125mmHg, continuous    Other: Tubigrip D single layer, patient's own walking boot.     Patient Education: Instructed patient about how wound vac works, may not have any drainage in tube or cannister & it will still be working.  Explained the ultimate goal of NPWT - granulation.  Explained how to change the cannister if it does become full.  Explained that if cannister fills with blood - go to ER.  Explained how to take sponge out and replace with Normal saline moistened gauze, cover with dry gauze and tape - provided patient with a dressings in case problems occur and wound vac is not working properly for > 2 hours.Educated patient to look at suction and determine leaks around wound vac, showed patient how to patch a leak and provided patient with drape pieces to patch leaks.      Professional Collaboration: Linda BERMEO for excisional debridement    Assessment:      Wound etiology: Surgical, infection.    Wound Progress: wound looks more viable after debridement.    Rationale for Treatment: Medihoney alginate to provide a moist wound environment, and facilitate autolytic debridement. Aquacel Ag to manage bioburden, absorb exudate, and maintain moist wound environment without laterally wicking exudate therefore reducing ravin-wound maceration. Plain Aquacel and non-adhesive foam to absorb drainage. Tubigrip for compression and to control edema.    Patient tolerance/compliance: Patient tolerated treatment well, agrees with plan of care.    Complicating factors: Type II Diabetes, infection.    Need for ongoing Advanced Wound Care services: Patient requires skilled therapeutic wound care services for product selection, application of product, debridement, close  monitoring with clinical assessment to expedite wound healing.     Plan:      Treatment Plan and Recommendations:  Diagnosis/ICD10: S91.331A, E11.9.    Procedures/CPT: CSWD 22030    Frequency: 3x/week for NPWT      Treatment Goals: STG 2 Weeks  LTG 4 Weeks   Granulation Tissue: 10% 20%   Decrease Necrotic Tissue to: 90% 80%   Wound Phase:  Proliferation Proliferation   Decrease Size by: 10% 20%   Periwound:  Intact Intact   Decrease tracts/undermining by: JENNIFER-slough JENNIFER-slough   Decrease Pain:  LOPS LOPS       At the time of each visit a thorough assessment of the patient is completed to assure the  appropriateness of our plan of care.  The dressings or modalities may need to be adapted   from the original plan to address any significant changes in the wound environment.

## 2018-08-30 NOTE — PROGRESS NOTES
PROCEDURE NOTE        Patient seen in collaboration with wound care provider, Kallie Temple RN      HPI:   47 y/o male with DM type II who presents to [facility] with dehisced R transmetatarsal amputation incision. Pt had R great toe ulcer mid June 2018. Went on vacation near [facility]. R great toe ulcer worsened, underwent R great toe ray amputation in [place], the toe was surgically left open. He came back to [place] and presented to [facility] ED 6/30 immediately. During hospital admission from 6/30-7/6/18, pt underwent R TMA and R GSR by Dr. Bernardo on 7/2/18. He discharged with HH and IV abx. He presented to LPS rounds on 7/13 and TMA site had slight incisional necrosis without drainage. By the next week the incision had deteriorated. Wound care orders were updated for Imani BLANCO. He followed up at [facility] for suture removal, and then another post op check mid August. At this point he was referred to [facility].     Today pt is seen for excisional debridement of R TMA wound prior to placement of NPWT. The periwound in intact, there is thick yellow fibrinous tissue to wound bed. No erythema noted, mild edema  Pt blood glucose levels are averaging 122 over last 40 days, checked this am and it was 119. a1c 13.9%  On 6/30/18  On Bactrim from ID. No N/V/F/C/D  Wearing giovana Boot all of the time  Using Knee scooter, walking short distances in boot    The procedure, rationale for doing so, and the possible risks- including infection, pain and bleeding- have been discussed with the patient.  The patient  verbalized understanding and is agreeable with proceeding.      DESCRIPTION OF PROCEDURE:  excisional debridement of R TMA wound and satellite wound prior to NPWT placement    PMH, medications and recent labs reviewed    ANESTHESIA:  insensate    PROCEDURE: A formal timeout was performed to confirm patient's correct name, correct site, correct procedure and correct laterality.  Scalpel, curette and forceps used to excise slough and senescent red  tissue from the wound bed.  Total area debrided, 6cm2.  Debridement carried into the muscle tissue layer. Bleeding controlled with manual pressure.  Wound care including VAC therapy completed by Kallie.  Patient tolerated the procedure well, without c/o pain or discomfort.      TMA:   80% red 20% adipose, ligament.  Measures 1.8 x 2.8 x 2    Satellite to lateral aspect:   100% red  measures  0.2 x 0.2 x 0.2                      ASSESSMENT/PLAN:        ICD-10-CM   1. S/P transmetatarsal amputation of foot, right (HCC)  Surgery 7/2/18 for R TMA and R GSR. GSR incision resolved. TMA dehisced, referred to [facility] 8/15  8/30: excisional debridement performed followed by VAC therapy. tubigrip applied for mild compression.  Pt to be seen 1x/wk with provider and 2x/wk with wound RN  Continue to wear offloading boot when out of bed Z89.431   2. Diabetes mellitus with insulin therapy ([facility])  a1c 13.9% 6/30/18. Blood glucose averaging 120s over last 40 days. Pt aware to keep blood glucose levels < 150 for improved wound healing. E11.9    Z79.4   3. Subacute osteomyelitis of right foot ([facility])  Followed by [facility]. Had 6wk ertapenem. Now on bactrim. Pt advised to go to ER for any increased redness, swelling, drainage or odor, or if he develops fever, chills, nausea or vomiting. M86.271         -

## 2018-09-01 ENCOUNTER — APPOINTMENT (OUTPATIENT)
Dept: WOUND CARE | Facility: MEDICAL CENTER | Age: 48
End: 2018-09-01
Attending: NURSE PRACTITIONER
Payer: COMMERCIAL

## 2018-09-01 PROCEDURE — 97605 NEG PRS WND THER DME<=50SQCM: CPT

## 2018-09-01 NOTE — WOUND TEAM
Advanced Wound Care  [facility]  [address]  [address]  [place] [address]  [phone] Fax: [phone]    Encounter Note  For Certification Period: 8/15/2018 - 11/5/2018  Start of Care: 8/15/2018    Referring Provider: REGINA Rai  Primary Physician: Nolan Buchanan D.O.      Consulting Providers: ELVIE Ramírez.        Wound(s): Right TMA       Subjective:        HPI 8/15/2018: Patient is a 47 year old diabetic male with a right TMA wound. He states that he had surgery while on vacation in [facility] to remove his right hallux in late June 2018 due to infection, and was sent home with instructions to go to [facility] ER when he got back to [place]. He was admitted at [facility] from 6/30/2018-7/6/2018 and had surgery with Dr. Bernardo on 7/2/2018 (right gastrocsoleus recession, right transmetatarsal amputation, and I & D of multiple compartments). After discharge, he was seen by home care, but was referred to [facility] for debridement.      Pain: Patient denies pain at wound site, LOPS.           Past Medical History:  Past Medical History:   Diagnosis Date   • Diabetes (HCC)      Current Medications:  Current Outpatient Prescriptions:   •  lisinopril (PRINIVIL, ZESTRIL) 40 MG tablet, Take 40 mg by mouth every day., Disp: , Rfl:   •  insulin glargine (LANTUS) 100 UNIT/ML Solution, Inject 10 Units as instructed every evening., Disp: , Rfl:   •  sulfamethoxazole-trimethoprim (BACTRIM DS) 800-160 MG tablet, Take 1 Tab by mouth 2 times a day for 30 days., Disp: 60 Tab, Rfl: 0  •  glimepiride (AMARYL) 4 MG Tab, Take 4 mg by mouth every day., Disp: , Rfl: 0  •  TRULICITY 1.5 MG/0.5ML Solution Pen-injector, 1.5 mg by Injection route every 7 days., Disp: , Rfl:   •  omeprazole (PRILOSEC) 20 MG delayed-release capsule, Take 20 mg by mouth every day., Disp: , Rfl:     Allergies: No Known Allergies      Objective:      Tests and Measures:  8/15/2018: Right DP and PT  pulses 2+, biphasic by doppler. He has a venous duplex and arterial studies ordered in [facility].    Orthotic, protective, supportive devices: Knee scooter, walking boot for RLE.    Fall Risk Assessment (vazquez all that apply with an X): Completed 8/15/2018- fall risk.       65 years or older        Fall within the last 2 years   X Uses ambulatory devices  X Loss of protective sensation in feet      Use of prostethic/orthotic    X Presence of lower extremity/foot/toe amputation      Taking medication that increases risk (per facility policy)    Interventions Recommended (if any of the above are selected):   Use of Assistive Device: Knee scooter   Supervision with ambulation: Independent   Assistance with ambulation: Independent   Home safety education: Educational material provided     Wound Characteristics                                                    Location:  Right [facility] Initial Evaluation  Date: 8/15/2018 Encounter Date:  09/01/2018   Tissue Type and %: 5% moist pink tissue, 95% adherent yellow. 80% moist pink, 20% mixed slough ligament fascia   Periwound: Intact Scant erythema, edema   Drainage: JENNIFER, minimal per patient. Minimal serosanguinous   Exposed structures JENNIFER due to slough, none visible or palpable. Bone palpated, fascia   Wound Edges:   Open Open   Odor: None None   S&S of Infection:   None, patient taking oral Bactrim. None, patient taking oral Bactrim.   Edema: None localized   Sensation: LOPS LOPS               Measurements:  Right [facility] Initial Evaluation  Date: 8/15/2018 Encounter Date:  08/30/2018  Main / lateral satellite   Length (cm) 2 1.8  /  0.2   Width (cm) 3.9 2.8  /  0.2   Depth (cm) 2.2 at deepest palpable point 2.0  /  <0.1   Area (cm2) 7.8 cm2 5.04 / 0.04 cm2   Tract/undermine JENNIFER due to slough         Procedures:     Debridement: Non selective with NS gauze   Cleansed with: No rinse foam cleanser to foot & leg, NS to wound after debridement   Periwound protected with: Skin prep.  drape   Primary dressin piece of black foam in the wound bed, 1 piece of black foam for tract pad and boaster for lateral side of wound bed.   Secondary Dressing: drape to secure, started NPWT at 125mmHg, continuous    Other: Tubigrip D single layer, patient's own walking boot.     Patient Education: Discussed plan of care and wound progress. Reviewed troubleshooting wound vac and how to patch a leak. Reviewed s/s infection and when to present to ED. Pt and spouse agreeable with plan of care and verbalized understanding to all instruction.       Professional Collaboration: None today  Assessment:      Wound etiology: Surgical, infection.    Wound Progress: Wound with no significant change since previous visit.     Rationale for Treatment:  NPWT to absorb exudate, maintain moist wound environment, contraction, encourage granulation tissue. Tubi for light compression.     Patient tolerance/compliance: Patient tolerated treatment well, agrees with plan of care.    Complicating factors: Type II Diabetes, infection.    Need for ongoing Advanced Wound Care services: Patient requires skilled therapeutic wound care services for product selection, application of product, debridement, close monitoring with clinical assessment to expedite wound healing.     Plan:      Treatment Plan and Recommendations:  Diagnosis/ICD10: S91.331A, E11.9.    Procedures/CPT: NPWT <50 cm -21536    Frequency: 3x/week - 30 minutes      Treatment Goals: STG 2 Weeks  LTG 4 Weeks   Granulation Tissue: 10% 20%   Decrease Necrotic Tissue to: 90% 80%   Wound Phase:  Proliferation Proliferation   Decrease Size by: 10% 20%   Periwound:  Intact Intact   Decrease tracts/undermining by: JENNIFER-slough JENNIFER-slough   Decrease Pain:  LOPS LOPS       At the time of each visit a thorough assessment of the patient is completed to assure the  appropriateness of our plan of care.  The dressings or modalities may need to be adapted   from the original plan to address  any significant changes in the wound environment.

## 2018-09-03 ENCOUNTER — NON-PROVIDER VISIT (OUTPATIENT)
Dept: WOUND CARE | Facility: MEDICAL CENTER | Age: 48
End: 2018-09-03
Attending: NURSE PRACTITIONER
Payer: COMMERCIAL

## 2018-09-03 PROCEDURE — 97597 DBRDMT OPN WND 1ST 20 CM/<: CPT

## 2018-09-03 NOTE — WOUND TEAM
Advanced Wound Care  [facility]  [address]  [address]  [place] [address]  [phone] Fax: [phone]    Encounter Note  For Certification Period: 8/15/2018 - 11/5/2018  Start of Care: 8/15/2018    Referring Provider: REGINA Rai  Primary Physician: Nolan Buchanan D.O.      Consulting Providers: ELVIE Ramírez.        Wound(s): Right TMA       Subjective:        HPI 8/15/2018: Patient is a 47 year old diabetic male with a right TMA wound. He states that he had surgery while on vacation in [facility] to remove his right hallux in late June 2018 due to infection, and was sent home with instructions to go to [facility] ER when he got back to [place]. He was admitted at [facility] from 6/30/2018-7/6/2018 and had surgery with Dr. Bernardo on 7/2/2018 (right gastrocsoleus recession, right transmetatarsal amputation, and I & D of multiple compartments). After discharge, he was seen by home care, but was referred to [facility] Advanced [facility] for debridement.      Pain: Patient denies pain at wound site, LOPS.           Past Medical History:  Past Medical History:   Diagnosis Date   • Diabetes (HCC)      Current Medications:  Current Outpatient Prescriptions:   •  lisinopril (PRINIVIL, ZESTRIL) 40 MG tablet, Take 40 mg by mouth every day., Disp: , Rfl:   •  insulin glargine (LANTUS) 100 UNIT/ML Solution, Inject 10 Units as instructed every evening., Disp: , Rfl:   •  sulfamethoxazole-trimethoprim (BACTRIM DS) 800-160 MG tablet, Take 1 Tab by mouth 2 times a day for 30 days., Disp: 60 Tab, Rfl: 0  •  glimepiride (AMARYL) 4 MG Tab, Take 4 mg by mouth every day., Disp: , Rfl: 0  •  TRULICITY 1.5 MG/0.5ML Solution Pen-injector, 1.5 mg by Injection route every 7 days., Disp: , Rfl:   •  omeprazole (PRILOSEC) 20 MG delayed-release capsule, Take 20 mg by mouth every day., Disp: , Rfl:     Allergies: No Known Allergies      Objective:      Tests and Measures:  8/15/2018: Right DP and PT  pulses 2+, biphasic by doppler. He has a venous duplex and arterial studies ordered in [facility].    Orthotic, protective, supportive devices: Knee scooter, walking boot for RLE.    Fall Risk Assessment (vazquez all that apply with an X): Completed 8/15/2018- fall risk.       65 years or older        Fall within the last 2 years   X Uses ambulatory devices  X Loss of protective sensation in feet      Use of prostethic/orthotic    X Presence of lower extremity/foot/toe amputation      Taking medication that increases risk (per facility policy)    Interventions Recommended (if any of the above are selected):   Use of Assistive Device: Knee scooter   Supervision with ambulation: Independent   Assistance with ambulation: Independent   Home safety education: Educational material provided     Wound Characteristics                                                    Location:  Right [facility] Initial Evaluation  Date: 8/15/2018 Encounter Date:  09/03/2018   Tissue Type and %: 5% moist pink tissue, 95% adherent yellow. 80% moist pink, 20% mixed slough ligament fascia   Periwound: Intact Scant erythema, edema   Drainage: JENNIFER, minimal per patient. Minimal serosanguinous   Exposed structures JENNIFER due to slough, none visible or palpable. Bone not palpated today, fascia   Wound Edges:   Open Open   Odor: None None   S&S of Infection:   None, patient taking oral Bactrim. None, patient taking oral Bactrim.   Edema: None Localized   Sensation: LOPS LOPS               Measurements:  Right [facility] Initial Evaluation  Date: 8/15/2018 Encounter Date:  08/30/2018  Main / lateral satellite   Length (cm) 2 1.8  /  0.2   Width (cm) 3.9 2.8  /  0.2   Depth (cm) 2.2 at deepest palpable point 2.0  /  <0.1   Area (cm2) 7.8 cm2 5.04 / 0.04 cm2   Tract/undermine JENNIFER due to slough         Procedures:     Debridement: CSWD to remove loose slough from wound bed. ~3 cm2 removed.     Cleansed with: No rinse foam cleanser to foot & leg, NS to wound after  debridement   Periwound protected with: Skin prep. drape   Primary dressin piece of black foam in the wound bed depth and to fill lateral wound bed, 1 piece of black foam to rest of wound bed and one button for tract pad (3 black total)   Secondary Dressing: drape to secure, started NPWT at 125mmHg, continuous    Other: Tubigrip D single layer, patient's own walking boot.     Patient Education: Discussed plan of care and wound progress. Reviewed troubleshooting wound vac and how to patch a leak. Reviewed s/s infection and when to present to ED. Pt and spouse agreeable with plan of care and verbalized understanding to all instruction.     Professional Collaboration: None today    Assessment:      Wound etiology: Surgical, infection.    Wound Progress: Bone not palpated today    Rationale for Treatment:  NPWT to absorb exudate, maintain moist wound environment, contraction, encourage granulation tissue. Tubi for light compression.     Patient tolerance/compliance: Patient tolerated treatment well, agrees with plan of care.    Complicating factors: Type II Diabetes, infection.    Need for ongoing Advanced Wound Care services: Patient requires skilled therapeutic wound care services for product selection, application of product, debridement, close monitoring with clinical assessment to expedite wound healing.     Plan:      Treatment Plan and Recommendations:  Diagnosis/ICD10: S91.331A, E11.9.    Procedures/CPT: NPWT <50 cm -03557    Frequency: 3x/week - 30 minutes      Treatment Goals: STG 2 Weeks  LTG 4 Weeks   Granulation Tissue: 10% 20%   Decrease Necrotic Tissue to: 90% 80%   Wound Phase:  Proliferation Proliferation   Decrease Size by: 10% 20%   Periwound:  Intact Intact   Decrease tracts/undermining by: JENNIFER-slough JENNIFER-slough   Decrease Pain:  LOPS LOPS       At the time of each visit a thorough assessment of the patient is completed to assure the  appropriateness of our plan of care.  The dressings or  modalities may need to be adapted   from the original plan to address any significant changes in the wound environment.

## 2018-09-05 ENCOUNTER — APPOINTMENT (OUTPATIENT)
Dept: WOUND CARE | Facility: MEDICAL CENTER | Age: 48
End: 2018-09-05
Attending: NURSE PRACTITIONER
Payer: COMMERCIAL

## 2018-09-05 PROCEDURE — 97597 DBRDMT OPN WND 1ST 20 CM/<: CPT

## 2018-09-05 NOTE — WOUND TEAM
Advanced Wound Care  [facility]  [address]  [address]  [place] [address]  [phone] Fax: [phone]    Encounter Note  For Certification Period: 8/15/2018 - 11/5/2018  Start of Care: 8/15/2018    Referring Provider: REGINA aRi  Primary Physician: Nolan Buchanan D.O.      Consulting Providers: ELVIE Ramírez.        Wound(s): Right TMA       Subjective:        HPI 8/15/2018: Patient is a 47 year old diabetic male with a right TMA wound. He states that he had surgery while on vacation in [facility] to remove his right hallux in late June 2018 due to infection, and was sent home with instructions to go to [facility] ER when he got back to [place]. He was admitted at [facility] from 6/30/2018-7/6/2018 and had surgery with Dr. Bernardo on 7/2/2018 (right gastrocsoleus recession, right transmetatarsal amputation, and I & D of multiple compartments). After discharge, he was seen by home care, but was referred to [facility]ound Care for debridement.      Pain: Patient denies pain at wound site, LOPS.           Past Medical History:  Past Medical History:   Diagnosis Date   • Diabetes (HCC)      Current Medications:  Current Outpatient Prescriptions:   •  lisinopril (PRINIVIL, ZESTRIL) 40 MG tablet, Take 40 mg by mouth every day., Disp: , Rfl:   •  insulin glargine (LANTUS) 100 UNIT/ML Solution, Inject 10 Units as instructed every evening., Disp: , Rfl:   •  sulfamethoxazole-trimethoprim (BACTRIM DS) 800-160 MG tablet, Take 1 Tab by mouth 2 times a day for 30 days., Disp: 60 Tab, Rfl: 0  •  glimepiride (AMARYL) 4 MG Tab, Take 4 mg by mouth every day., Disp: , Rfl: 0  •  TRULICITY 1.5 MG/0.5ML Solution Pen-injector, 1.5 mg by Injection route every 7 days., Disp: , Rfl:   •  omeprazole (PRILOSEC) 20 MG delayed-release capsule, Take 20 mg by mouth every day., Disp: , Rfl:     Allergies: No Known Allergies      Objective:      Tests and Measures:  8/15/2018: Right DP and PT  pulses 2+, biphasic by doppler. He has a venous duplex and arterial studies ordered in [facility].    Orthotic, protective, supportive devices: Knee scooter, walking boot for RLE.    Fall Risk Assessment (vazquez all that apply with an X): Completed 8/15/2018- fall risk.       65 years or older        Fall within the last 2 years   X Uses ambulatory devices  X Loss of protective sensation in feet      Use of prostethic/orthotic    X Presence of lower extremity/foot/toe amputation      Taking medication that increases risk (per facility policy)    Interventions Recommended (if any of the above are selected):   Use of Assistive Device: Knee scooter   Supervision with ambulation: Independent   Assistance with ambulation: Independent   Home safety education: Educational material provided     Wound Characteristics                                                    Location:  Right TMA Initial Evaluation  Date: 8/15/2018 Encounter Date:  09/05/2018   Tissue Type and %: 5% moist pink tissue, 95% adherent yellow. 85% moist pink, 15% mixed slough ligament fascia   Periwound: Intact Denudation lateral to wound   Drainage: JENNIFER, minimal per patient. scant serosanguinous   Exposed structures JENNIFER due to slough, none visible or palpable. Bone not palpated today, fascia   Wound Edges:   Open Open   Odor: None None   S&S of Infection:   None, patient taking oral Bactrim. None, patient taking oral Bactrim.   Edema: None Localized   Sensation: LOPS LOPS               Measurements:  Right TMA Initial Evaluation  Date: 8/15/2018 Encounter Date:  08/30/2018  Main / lateral satellite   Length (cm) 2 1.8  /  0.2   Width (cm) 3.9 2.8  /  0.2   Depth (cm) 2.2 at deepest palpable point 2.0  /  <0.1   Area (cm2) 7.8 cm2 5.04 / 0.04 cm2   Tract/undermine JENNIFER due to slough         Procedures:     Debridement: CSWD to remove loose slough from wound bed. ~1.5 cm2 removed.     Cleansed with: No rinse foam cleanser to foot & leg, NS to wound after  debridement   Periwound protected with: Skin prep. Drape; Aquacel Ag & brava strip to denuded tissue   Primary dressing: medihoney gel, 1 piece of black foam in the wound bed depth and to fill lateral wound bed, 1 piece of black foam to rest of wound bed and one button for tract pad (2 black total)   Secondary Dressing: drape to secure, started NPWT at 125mmHg, continuous    Other: Tubigrip E single layer, patient's own walking boot.     Patient Education: Discussed plan of care and wound progress. Reviewed troubleshooting wound vac and how to patch a leak. Reviewed s/s infection and when to present to ED. Instructed how to don & remove tubigrip stocking. Pt and spouse agreeable with plan of care and verbalized understanding to all instruction.     Professional Collaboration: None today    Assessment:      Wound etiology: Surgical, infection.    Wound Progress: increased quality tissue    Rationale for Treatment:  NPWT to absorb exudate, maintain moist wound environment, contraction, encourage granulation tissue. Tubi for light compression.     Patient tolerance/compliance: Patient tolerated treatment well, agrees with plan of care.    Complicating factors: Type II Diabetes, infection.    Need for ongoing Advanced Wound Care services: Patient requires skilled therapeutic wound care services for product selection, application of product, debridement, close monitoring with clinical assessment to expedite wound healing.     Plan:      Treatment Plan and Recommendations:  Diagnosis/ICD10: S91.331A, E11.9.    Procedures/CPT: NPWT <50 cm -86431    Frequency: 3x/week - 30 minutes      Treatment Goals: STG 2 Weeks  LTG 4 Weeks   Granulation Tissue: 10% 20%   Decrease Necrotic Tissue to: 90% 80%   Wound Phase:  Proliferation Proliferation   Decrease Size by: 10% 20%   Periwound:  Intact Intact   Decrease tracts/undermining by: JENNIFER-slough JENNIFER-slough   Decrease Pain:  LOPS LOPS       At the time of each visit a thorough  assessment of the patient is completed to assure the  appropriateness of our plan of care.  The dressings or modalities may need to be adapted   from the original plan to address any significant changes in the wound environment.

## 2018-09-06 ENCOUNTER — OFFICE VISIT (OUTPATIENT)
Dept: INFECTIOUS DISEASES | Facility: MEDICAL CENTER | Age: 48
End: 2018-09-06
Payer: COMMERCIAL

## 2018-09-06 VITALS
HEART RATE: 95 BPM | DIASTOLIC BLOOD PRESSURE: 70 MMHG | WEIGHT: 215 LBS | BODY MASS INDEX: 29.12 KG/M2 | HEIGHT: 72 IN | SYSTOLIC BLOOD PRESSURE: 118 MMHG | TEMPERATURE: 99 F | OXYGEN SATURATION: 97 %

## 2018-09-06 DIAGNOSIS — A49.01 MSSA (METHICILLIN SUSCEPTIBLE STAPHYLOCOCCUS AUREUS) INFECTION: ICD-10-CM

## 2018-09-06 DIAGNOSIS — E11.65 TYPE 2 DIABETES MELLITUS WITH HYPERGLYCEMIA, WITHOUT LONG-TERM CURRENT USE OF INSULIN (HCC): ICD-10-CM

## 2018-09-06 DIAGNOSIS — M86.271 SUBACUTE OSTEOMYELITIS OF RIGHT FOOT (HCC): ICD-10-CM

## 2018-09-06 PROCEDURE — 99213 OFFICE O/P EST LOW 20 MIN: CPT | Performed by: NURSE PRACTITIONER

## 2018-09-06 RX ORDER — SULFAMETHOXAZOLE AND TRIMETHOPRIM 800; 160 MG/1; MG/1
1 TABLET ORAL 2 TIMES DAILY
Qty: 60 TAB | Refills: 0 | Status: SHIPPED | OUTPATIENT
Start: 2018-09-06 | End: 2018-10-06

## 2018-09-06 NOTE — PROGRESS NOTES
Infectious Disease Clinic    Subjective:     Chief Complaint   Patient presents with   • Follow-Up     Osteomyelitis of right foot     Interval History: 48 y.o. white male with history of noninsulin-dependent diabetes.  Approximately 2 weeks prior to admission he had injured his right toe, noting worsening pain and erythema.  He was on vacation in [place], had gone swimming and noticed that his toe seemed to be getting actively worse.  He went to [facility], had an MRI, which showed osteomyelitis and he underwent amputation of the first digit.  He was given oral Bactrim postoperatively; unfortunately, it did not sound like he received proper wound care with wet to dry dressings.  Hospitalized from 6/30-7/6/18, after noticing at home he noticed that the amputation site was deteriorating.  Underwent Right TMA with I&D and GSR on 7/2 with Dr. Bernardo.  OR cx +candida albicans.  Cx from [facility] on 6/29 +MSSA.  Discharged home on IV ertapenem through 8/13/18.    7/18/2018: Seen by ELVIE Zacarias.  Being followed by [facility] 2 times a week for wound care.  Continue IV ertapenem through 8/13/18.    8/13/2018: Seen by ELVIE Zacarias.  Was seen by ELVIE Whittington with [facility] last week, there is concern for part of the surgical TMA site, it is slow to heal.  Patient being referred to [facility] wound clinic, first appointment this Wednesday. Finished IV ertapenem today.  Start p.o. Bactrim tomorrow, plan for 1-3 months of treatment.    Today, 9/6/2018: Tolerating the PO Bactrim without issue.  Denies feeling generally ill, fevers/chills, general malaise, headache, n/v/d, abdominal pain, chest pain or shortness of breath.  Being seen at the [facility] wound clinic, has a wound VAC in place.  Denies excessive drainage, minimal redness, trace swelling.  Denies any pain.  Has a back tag that starts on 9/22 that he plans to go out hunting for.  BS has average 125 over the last 90 days.    ROS  As documented above in my  HPI.    Past Medical History:   Diagnosis Date   • Diabetes (HCC)        Social History   Substance Use Topics   • Smoking status: Never Smoker   • Smokeless tobacco: Never Used   • Alcohol use No       Allergies: Patient has no known allergies.    Pt's medication and problem list reviewed.     Objective:     PE:  /70   Pulse 95   Temp 37.2 °C (99 °F)   Ht 1.829 m (6')   Wt 97.5 kg (215 lb)   SpO2 97%   BMI 29.16 kg/m²     Vital signs reviewed    Constitutional: Appears well-developed and well-nourished. No acute distress.  Speech fluent.    Eyes: Conjunctivae normal and EOM are normal. Pupils are equal, round, and reactive to light.   Neck: Trachea midline. Normal range of motion. No JVD.  Neck supple.  Cardiovascular: Normal rate, regular rhythm, normal heart sounds and intact RLE distal pulse. No murmur. Trace RLE edema.  Respiratory: No respiratory distress, unlabored respiratory effort.  Lungs clear to auscultation bilaterally. No wheezes or rales.   Abdomen: Soft, non tender, non-distended. BS + x 4. No masses.   Musculoskeletal: Fairly steady gait.  Slightly limited RLE range of motion.    Wound VAC, dressing and Tubigrip in place, WC notes and pictures from 8/30 and 9/4 reviewed:  Right TMA-Main: 1.8 x 2.8 x 2.0 cm, and lateral: 0.2 x 0.2 x 0.1 cm, moist pink tissue with mixed left, scant serosanguineous drainage with no odor, no erythema to surrounding tissue, no maceration.  Skin: Warm and dry. No visible rashes or lesions.  Neurological: No cranial nerve deficit. Coordination normal.  Decreased RLE sensation.  Psychiatric: Alert and oriented to person, place, and time. Normal mood, calm affect.  Normal behavior and judgment.     Labs from [facility] on 8/21/18:  WBC 7.3  Hemoglobin 14.7  Hematocrit 43.5  Platelets 301  Glucose 168  BUN 19  Creatinine 0.97  Sodium 139  Potassium 4.9  AST 18  ALT 21  Alk phos 71  ESR 19    Assessment and Plan:   The following treatment plan was discussed with  patient at length:    1. Subacute osteomyelitis of right foot (HCC)  sulfamethoxazole-trimethoprim (BACTRIM DS) 800-160 MG tablet    CBC WITH DIFFERENTIAL    COMP METABOLIC PANEL    -Continue PO Bactrim, plan for 1-3 months based on rate of healing.  -CBC and CMP to be done in approximately 3-4 weeks, monitoring for leukocytosis, thrombocytopenia and hepato-or nephrotoxicity.  -Wound care as directed.   2. MSSA (methicillin susceptible Staphylococcus aureus) infection  sulfamethoxazole-trimethoprim (BACTRIM DS) 800-160 MG tablet    CBC WITH DIFFERENTIAL    COMP METABOLIC PANEL    As above.   3. Type 2 diabetes mellitus with hyperglycemia, without long-term current use of insulin (HCC)      HgA1C 13.6% on 6/30/18.  Continue to monitor blood sugars closely as DM will impair wound healing and can worsen infection.     Follow up: 4 weeks, RTC sooner if needed. FU with PCP for ongoing chronic medical conditions.     SIERRA Limon.       Please note that this dictation was created using voice recognition software. I have  worked with technical experts from [facility] to optimize the interface.  I have made every reasonable attempt to correct obvious errors, but there may be errors of grammar and possibly content that I did not discover before finalizing the note.

## 2018-09-07 ENCOUNTER — NON-PROVIDER VISIT (OUTPATIENT)
Dept: WOUND CARE | Facility: MEDICAL CENTER | Age: 48
End: 2018-09-07
Attending: NURSE PRACTITIONER
Payer: COMMERCIAL

## 2018-09-07 PROCEDURE — 97605 NEG PRS WND THER DME<=50SQCM: CPT

## 2018-09-07 PROCEDURE — 97597 DBRDMT OPN WND 1ST 20 CM/<: CPT

## 2018-09-07 NOTE — WOUND TEAM
Advanced Wound Care  [facility]  [address]  [address]  [place] [address]  [phone] Fax: [phone]    Encounter Note  For Certification Period: 8/15/2018 - 11/5/2018  Start of Care: 8/15/2018    Referring Provider: REGINA Ria  Primary Physician: Nolan Buchanan D.O.      Consulting Providers: ELVIE Ramírez.        Wound(s): Right TMA       Subjective:        HPI 8/15/2018: Patient is a 47 year old diabetic male with a right TMA wound. He states that he had surgery while on vacation in [facility] to remove his right hallux in late June 2018 due to infection, and was sent home with instructions to go to [facility] ER when he got back to [place]. He was admitted at [facility] from 6/30/2018-7/6/2018 and had surgery with Dr. Bernardo on 7/2/2018 (right gastrocsoleus recession, right transmetatarsal amputation, and I & D of multiple compartments). After discharge, he was seen by home care, but was referred to [facility] for debridement.      Pain: Patient denies pain at wound site, LOPS.           Allergies: No Known Allergies      Objective:      Tests and Measures:  8/15/2018: Right DP and PT pulses 2+, biphasic by doppler. He has a venous duplex and arterial studies ordered in [facility].    Orthotic, protective, supportive devices: Knee scooter, walking boot for RLE.    Fall Risk Assessment (vazquez all that apply with an X): Completed 8/15/2018- fall risk.        Wound Characteristics                                                    Location:  Right TMA Initial Evaluation  Date: 8/15/2018 Encounter Date:  09/07/2018   Tissue Type and %: 5% moist pink tissue, 95% adherent yellow. 85% moist pink, 15% mixed slough ligament fascia   Periwound: Intact Denudation lateral to wound   Drainage: JENNIFER, minimal per patient. scant serosanguinous   Exposed structures JENNIFER due to slough, none visible or palpable. Bone not palpated today, fascia   Wound Edges:   Open Open   Odor:  None None   S&S of Infection:   None, patient taking oral Bactrim. None, patient taking oral Bactrim.   Edema: None Localized   Sensation: LOPS LOPS               Measurements:  Right TMA Initial Evaluation  Date: 8/15/2018 Encounter Date:  09/07/2018  Main / lateral satellite   Length (cm) 2 1.2 / resolved   Width (cm) 3.9 3.5   Depth (cm) 2.2 at deepest palpable point 1.0   Area (cm2) 7.8 cm2 4.2   Tract/undermine JENNIFER due to slough None              Procedures:     Debridement: CSWD to remove loose slough from wound bed. ~1.5 cm2 removed.     Cleansed with: No rinse foam cleanser to foot & leg, NS to wound after debridement   Periwound protected with: Skin prep. Drape; Aquacel Ag & brava strip to denuded tissue   Primary dressing: medihoney gel, 1 piece of black foam in the wound bed depth and to fill lateral wound bed, 1 piece of black foam to rest of wound bed and one button for tract pad (2 black total)   Secondary Dressing: drape to secure, resumed NPWT at 125mmHg, continuous    Other: Tubigrip E single layer, patient's own walking boot.     Patient Education: Discussed plan of care and wound progress. Reviewed troubleshooting wound vac and how to patch a leak. Reviewed s/s infection and when to present to ED. Instructed how to don & remove tubigrip stocking. Pt and spouse agreeable with plan of care and verbalized understanding to all instruction.     Professional Collaboration: None today    Assessment:      Wound etiology: Surgical, infection.    Wound Progress: increased quality tissue, decreased in size per measurements    Rationale for Treatment:  NPWT to absorb exudate, maintain moist wound environment, contraction, encourage granulation tissue. Tubi for light compression.     Patient tolerance/compliance: Patient tolerated treatment well, agrees with plan of care.    Complicating factors: Type II Diabetes, infection.    Need for ongoing Advanced Wound Care services: Patient requires skilled therapeutic  wound care services for product selection, application of product, debridement, close monitoring with clinical assessment to expedite wound healing.     Plan:      Treatment Plan and Recommendations:  Diagnosis/ICD10: S91.331A, E11.9.    Procedures/CPT: NPWT <50 cm -71409    Frequency: 3x/week - 30 minutes      Treatment Goals: STG 2 Weeks  LTG 4 Weeks   Granulation Tissue: 10% 20%   Decrease Necrotic Tissue to: 90% 80%   Wound Phase:  Proliferation Proliferation   Decrease Size by: 10% 20%   Periwound:  Intact Intact   Decrease tracts/undermining by: JENNIFER-slough JENNIFER-slough   Decrease Pain:  LOPS LOPS       At the time of each visit a thorough assessment of the patient is completed to assure the  appropriateness of our plan of care.  The dressings or modalities may need to be adapted   from the original plan to address any significant changes in the wound environment.

## 2018-09-08 ENCOUNTER — APPOINTMENT (OUTPATIENT)
Dept: WOUND CARE | Facility: MEDICAL CENTER | Age: 48
End: 2018-09-08
Attending: NURSE PRACTITIONER
Payer: COMMERCIAL

## 2018-09-10 ENCOUNTER — NON-PROVIDER VISIT (OUTPATIENT)
Dept: WOUND CARE | Facility: MEDICAL CENTER | Age: 48
End: 2018-09-10
Attending: NURSE PRACTITIONER
Payer: COMMERCIAL

## 2018-09-10 PROCEDURE — 97597 DBRDMT OPN WND 1ST 20 CM/<: CPT

## 2018-09-10 NOTE — WOUND TEAM
Advanced Wound Care  [facility]  [address]  [address]  [place] [address]  [phone] Fax: [phone]    Encounter Note  For Certification Period: 8/15/2018 - 11/5/2018  Start of Care: 8/15/2018    Referring Provider: REGINA Rai  Primary Physician: Nolan Buchanan D.O.      Consulting Providers: ELVIE Ramírez.        Wound(s): Right TMA       Subjective:        HPI 8/15/2018: Patient is a 47 year old diabetic male with a right TMA wound. He states that he had surgery while on vacation in [facility] to remove his right hallux in late June 2018 due to infection, and was sent home with instructions to go to [facility] ER when he got back to [place]. He was admitted at [facility] from 6/30/2018-7/6/2018 and had surgery with Dr. Bernardo on 7/2/2018 (right gastrocsoleus recession, right transmetatarsal amputation, and I & D of multiple compartments). After discharge, he was seen by home care, but was referred to [facility] for debridement.      Pain: Patient denies pain at wound site, LOPS.           Allergies: No Known Allergies      Objective:      Tests and Measures:  8/15/2018: Right DP and PT pulses 2+, biphasic by doppler. He has a venous duplex and arterial studies ordered in [facility].    Orthotic, protective, supportive devices: Knee scooter, walking boot for RLE.    Fall Risk Assessment (vazquez all that apply with an X): Completed 8/15/2018- fall risk.        Wound Characteristics                                                    Location:  Right TMA Initial Evaluation  Date: 8/15/2018 Encounter Date:  09/10/2018   Tissue Type and %: 5% moist pink tissue, 95% adherent yellow. 85% moist pink, 15% mixed slough ligament fascia   Periwound: Intact Denudation lateral to wound, irritation   Drainage: JENNIFER, minimal per patient. scant serosanguinous   Exposed structures JENINFER due to slough, none visible or palpable. Bone palpable   Wound Edges:   Open Open   Odor: None  None   S&S of Infection:   None, patient taking oral Bactrim. None, patient taking oral Bactrim.   Edema: None Localized   Sensation: LOPS LOPS               Measurements:  Right TMA Initial Evaluation  Date: 8/15/2018 Encounter Date:  09/07/2018  Main / lateral satellite   Length (cm) 2 1.2 / resolved   Width (cm) 3.9 3.5   Depth (cm) 2.2 at deepest palpable point 1.0   Area (cm2) 7.8 cm2 4.2   Tract/undermine JENNIFER due to slough None              Procedures:     Debridement: CSWD to remove loose slough from wound bed. ~1.5 cm2 removed.     Cleansed with: No rinse foam cleanser to foot & leg, NS to wound after debridement   Periwound protected with: Skin prep, brava strip to denuded tissue and irritated area   Primary dressing: medihoney gel, 1 piece of black foam in the wound bed depth and to fill lateral wound bed, 1 piece of black foam to rest of wound bed and one button for tract pad (2 black total)   Secondary Dressing: drape to secure, resumed NPWT at 125mmHg, continuous    Other: Tubigrip E single layer, patient's own walking boot.     Patient Education: Discussed plan of care and wound progress. Reviewed troubleshooting wound vac and how to patch a leak. Reviewed s/s infection and when to present to ED. Instructed how to don & remove tubigrip stocking. Pt and spouse agreeable with plan of care and verbalized understanding to all instruction.     Professional Collaboration: None today    Assessment:      Wound etiology: Surgical, infection.    Wound Progress: No significant change since previous visit    Rationale for Treatment:  NPWT to absorb exudate, maintain moist wound environment, contraction, encourage granulation tissue. Tubi for light compression.     Patient tolerance/compliance: Patient tolerated treatment well, agrees with plan of care.    Complicating factors: Type II Diabetes, infection.    Need for ongoing Advanced Wound Care services: Patient requires skilled therapeutic wound care services for  product selection, application of product, debridement, close monitoring with clinical assessment to expedite wound healing.     Plan:      Treatment Plan and Recommendations:  Diagnosis/ICD10: S91.331A, E11.9.    Procedures/CPT: NPWT <50 cm -96774    Frequency: 3x/week - 30 minutes      Treatment Goals: STG 2 Weeks  LTG 4 Weeks   Granulation Tissue: 10% 20%   Decrease Necrotic Tissue to: 90% 80%   Wound Phase:  Proliferation Proliferation   Decrease Size by: 10% 20%   Periwound:  Intact Intact   Decrease tracts/undermining by: JENNIFER-slough JENNIFER-slough   Decrease Pain:  LOPS LOPS       At the time of each visit a thorough assessment of the patient is completed to assure the  appropriateness of our plan of care.  The dressings or modalities may need to be adapted   from the original plan to address any significant changes in the wound environment.

## 2018-09-12 ENCOUNTER — NON-PROVIDER VISIT (OUTPATIENT)
Dept: WOUND CARE | Facility: MEDICAL CENTER | Age: 48
End: 2018-09-12
Attending: NURSE PRACTITIONER
Payer: COMMERCIAL

## 2018-09-12 PROCEDURE — 97597 DBRDMT OPN WND 1ST 20 CM/<: CPT

## 2018-09-12 NOTE — WOUND TEAM
Advanced Wound Care  [facility]  [address]  [address]  [place] [address]  [phone] Fax: [phone]    Encounter Note  For Certification Period: 8/15/2018 - 11/5/2018  Start of Care: 8/15/2018    Referring Provider: REGINA Rai  Primary Physician: Nolan Buchanan D.O.      Consulting Providers: ELVIE Ramírez.        Wound(s): Right TMA       Subjective:        HPI 8/15/2018: Patient is a 47 year old diabetic male with a right TMA wound. He states that he had surgery while on vacation in [facility] to remove his right hallux in late June 2018 due to infection, and was sent home with instructions to go to [facility] ER when he got back to [place]. He was admitted at [facility] from 6/30/2018-7/6/2018 and had surgery with Dr. Bernardo on 7/2/2018 (right gastrocsoleus recession, right transmetatarsal amputation, and I & D of multiple compartments). After discharge, he was seen by home care, but was referred to [facility]ound [facility] for debridement.      Pain: Patient denies pain at wound site, LOPS.           Allergies: No Known Allergies      Objective:      Tests and Measures:  8/15/2018: Right DP and PT pulses 2+, biphasic by doppler. He has a venous duplex and arterial studies ordered in [facility].    Orthotic, protective, supportive devices: Knee scooter, walking boot for RLE.    Fall Risk Assessment (vazquez all that apply with an X): Completed 8/15/2018- fall risk.        Wound Characteristics                                                    Location:  Right TMA Initial Evaluation  Date: 8/15/2018 Encounter Date:  09/12/2018   Tissue Type and %: 5% moist pink tissue, 95% adherent yellow. 85% moist pink, 15% adherent yellow   Periwound: Intact Mild irritation to dorsal foot   Drainage: JENNIFER, minimal per patient. scant serosanguinous   Exposed structures JENNIFER due to slough, none visible or palpable. None noted; JENNIFER fully especially on lateral aspect of wound bed   Wound  Edges:   Open Open   Odor: None None   S&S of Infection:   None, patient taking oral Bactrim. None, patient taking oral Bactrim.   Edema: None Trace locally   Sensation: LOPS LOPS               Measurements:  Right TMA Initial Evaluation  Date: 8/15/2018 Encounter Date:  2018  Main / lateral satellite   Length (cm) 2 1.2 / resolved   Width (cm) 3.9 3.5   Depth (cm) 2.2 at deepest palpable point 1.0   Area (cm2) 7.8 cm2 4.2   Tract/undermine JENNIFER due to slough None     Procedures:     Debridement: CSWD using scalpel to remove loose slough from wound bed. ~2 cm2 removed.     Cleansed with: No rinse foam cleanser to foot & leg, NS to wound after debridement   Periwound protected with: Skin prep, brava strips to ravin wound   Primary dressin piece of black foam in the wound bed and 1 piece black foam to accommodate trac pad. (2 black total)   Secondary Dressing: drape to secure, resumed NPWT at 125mmHg, continuous    Other: Tubigrip E single layer, patient's own walking boot.     Patient Education: Discussed plan of care and wound progress. Reviewed troubleshooting wound vac and how to patch a leak. Reviewed s/s infection and when to present to ED. Instructed how to don & remove tubigrip stocking. Pt and spouse agreeable with plan of care and verbalized understanding to all instruction.     Professional Collaboration: None today    Assessment:      Wound etiology: Surgical, infection.    Wound Progress: No significant change since previous visit    Rationale for Treatment:  NPWT to absorb exudate, maintain moist wound environment, contraction, encourage granulation tissue. Tubi for light compression.     Patient tolerance/compliance: Patient tolerated treatment well, agrees with plan of care.    Complicating factors: Type II Diabetes, infection.    Need for ongoing Advanced Wound Care services: Patient requires skilled therapeutic wound care services for product selection, application of product, debridement,  close monitoring with clinical assessment to expedite wound healing.     Plan:      Treatment Plan and Recommendations:  Diagnosis/ICD10: S91.331A, E11.9.    Procedures/CPT: cswd 46667    Frequency: 3x/week - 30 minutes      Treatment Goals: STG 2 Weeks  LTG 4 Weeks   Granulation Tissue: 90% 100%   Decrease Necrotic Tissue to: 10% 0%   Wound Phase:  Proliferation Proliferation   Decrease Size by: 10% 40%   Periwound:  Intact Intact   Decrease tracts/undermining by: none none   Decrease Pain:  LOPS LOPS       At the time of each visit a thorough assessment of the patient is completed to assure the  appropriateness of our plan of care.  The dressings or modalities may need to be adapted   from the original plan to address any significant changes in the wound environment.

## 2018-09-14 ENCOUNTER — NON-PROVIDER VISIT (OUTPATIENT)
Dept: WOUND CARE | Facility: MEDICAL CENTER | Age: 48
End: 2018-09-14
Attending: NURSE PRACTITIONER
Payer: COMMERCIAL

## 2018-09-14 PROCEDURE — 97605 NEG PRS WND THER DME<=50SQCM: CPT

## 2018-09-14 NOTE — WOUND TEAM
Advanced Wound Care  [facility]  [address]  [address]  [place] [address]  [phone] Fax: [phone]    Encounter Note  For Certification Period: 8/15/2018 - 11/5/2018  Start of Care: 8/15/2018    Referring Provider: REGINA Rai  Primary Physician: Nolan Buchanan D.O.      Consulting Providers: ELVIE Ramírez.        Wound(s): Right TMA       Subjective:        HPI 8/15/2018: Patient is a 47 year old diabetic male with a right TMA wound. He states that he had surgery while on vacation in [facility] to remove his right hallux in late June 2018 due to infection, and was sent home with instructions to go to [facility] ER when he got back to [place]. He was admitted at [facility] from 6/30/2018-7/6/2018 and had surgery with Dr. Bernardo on 7/2/2018 (right gastrocsoleus recession, right transmetatarsal amputation, and I & D of multiple compartments). After discharge, he was seen by home care, but was referred to [facility] Advanced [facility]ound Care for debridement.      Pain: Patient denies pain at wound site, LOPS.           Allergies: No Known Allergies      Objective:      Tests and Measures:  8/15/2018: Right DP and PT pulses 2+, biphasic by doppler. He has a venous duplex and arterial studies ordered in [facility].    Orthotic, protective, supportive devices: Knee scooter, walking boot for RLE.    Fall Risk Assessment (vazquez all that apply with an X): Completed 8/15/2018- fall risk.        Wound Characteristics                                                    Location:  Right TMA Initial Evaluation  Date: 8/15/2018 Encounter Date:  09/14/2018   Tissue Type and %: 5% moist pink tissue, 95% adherent yellow. 85% moist red granulation, 15% adherent yellow   Periwound: Intact Red irritation to dorsal foot   Drainage: JENNIFER, minimal per patient. scant serosanguinous   Exposed structures JENNIFER due to slough, none visible or palpable. None noted   Wound Edges:   Open Open   Odor: None None   S&S of  Infection:   None, patient taking oral Bactrim. None   Edema: None Trace locally   Sensation: LOPS LOPS               Measurements:  Right TMA Initial Evaluation  Date: 8/15/2018 Encounter Date:  09/14/2018  Main / lateral satellite   Length (cm) 2 0.9/ resolved   Width (cm) 3.9 2.9   Depth (cm) 2.2 at deepest palpable point 0.6   Area (cm2) 7.8 cm2 2.61   Tract/undermine JENNIFER due to slough None     Procedures:     Debridement: non selective with moist gauze     Cleansed with: No rinse foam cleanser to foot & leg, NS to wound after debridement   Periwound protected with: Skin prep, brava strips to ravin wound & over irritation   Primary dressing: moistened nikolas, 1 piece of black foam in the wound bed and 1 piece black foam to accommodate trac pad. (2 black total)   Secondary Dressing: drape to secure, resumed NPWT at 125mmHg, continuous    Other: Tubigrip E single layer, patient's own walking boot.     Patient Education: Discussed plan of care and wound progress. Reviewed troubleshooting wound vac and how to patch a leak. Reviewed s/s infection and when to present to ED. Instructed how to don & remove tubigrip stocking. Reviewed importance of hydration, nutrition, and management of blood sugars for wound healing. Pt and spouse agreeable with plan of care and verbalized understanding to all instruction.     Professional Collaboration: None today    Assessment:      Wound etiology: Surgical, infection.    Wound Progress: decreased size    Rationale for Treatment:  Nikolas to encourage cellular growth with collagen matrix. NPWT to absorb exudate, maintain moist wound environment, contraction, encourage granulation tissue. Tubi for light compression.     Patient tolerance/compliance: Patient tolerated treatment well, agrees with plan of care. Very eager to heal.    Complicating factors: Type II Diabetes, infection.    Need for ongoing Advanced Wound Care services: Patient requires skilled therapeutic wound care services  for product selection, application of product, debridement, close monitoring with clinical assessment to expedite wound healing.     Plan:      Treatment Plan and Recommendations:  Diagnosis/ICD10: S91.331A, E11.9.    Procedures/CPT: cswd 04068    Frequency: 3x/week - 30 minutes      Treatment Goals: STG 2 Weeks  LTG 4 Weeks   Granulation Tissue: 90% 100%   Decrease Necrotic Tissue to: 10% 0%   Wound Phase:  Proliferation Proliferation   Decrease Size by: 10% 40%   Periwound:  Intact Intact   Decrease tracts/undermining by: none none   Decrease Pain:  LOPS LOPS       At the time of each visit a thorough assessment of the patient is completed to assure the  appropriateness of our plan of care.  The dressings or modalities may need to be adapted   from the original plan to address any significant changes in the wound environment.

## 2018-09-17 ENCOUNTER — NON-PROVIDER VISIT (OUTPATIENT)
Dept: WOUND CARE | Facility: MEDICAL CENTER | Age: 48
End: 2018-09-17
Attending: NURSE PRACTITIONER
Payer: COMMERCIAL

## 2018-09-17 PROCEDURE — 97597 DBRDMT OPN WND 1ST 20 CM/<: CPT

## 2018-09-17 NOTE — WOUND TEAM
Advanced Wound Care  [facility]  [address]  [address]  [place] [address]  [phone] Fax: [phone]    Encounter Note  For Certification Period: 8/15/2018 - 11/5/2018  Start of Care: 8/15/2018    Referring Provider: REGINA Rai  Primary Physician: Nolan Buchanan D.O.      Consulting Providers: ELVIE Ramírez.        Wound(s): Right TMA       Subjective:        HPI 8/15/2018: Patient is a 47 year old diabetic male with a right TMA wound. He states that he had surgery while on vacation in [facility] to remove his right hallux in late June 2018 due to infection, and was sent home with instructions to go to [facility] ER when he got back to [place]. He was admitted at [facility] from 6/30/2018-7/6/2018 and had surgery with Dr. Bernardo on 7/2/2018 (right gastrocsoleus recession, right transmetatarsal amputation, and I & D of multiple compartments). After discharge, he was seen by home care, but was referred to [facility] for debridement.      Pain: Patient denies pain at wound site, LOPS.           Allergies: No Known Allergies      Objective:      Tests and Measures:  8/15/2018: Right DP and PT pulses 2+, biphasic by doppler. He has a venous duplex and arterial studies ordered in [facility].    Orthotic, protective, supportive devices: Knee scooter, walking boot for RLE.    Fall Risk Assessment (vazquez all that apply with an X): Completed 8/15/2018- fall risk.        Wound Characteristics                                                    Location:  Right TMA Initial Evaluation  Date: 8/15/2018 Encounter Date:  09/17/2018   Tissue Type and %: 5% moist pink tissue, 95% adherent yellow. 85% moist red granulation, 15% adherent yellow   Periwound: Intact Decreased irritation to dorsal foot (nearly resolved)   Drainage: JENNIFER, minimal per patient. scant serosanguinous   Exposed structures JENNIFER due to slough, none visible or palpable. None noted   Wound Edges:   Open Open    Odor: None None   S&S of Infection:   None, patient taking oral Bactrim. None   Edema: None Trace locally   Sensation: LOPS LOPS               Measurements:  Right TMA Initial Evaluation  Date: 8/15/2018 Encounter Date:  09/14/2018  Main / lateral satellite   Length (cm) 2 0.9/ resolved   Width (cm) 3.9 2.9   Depth (cm) 2.2 at deepest palpable point 0.6   Area (cm2) 7.8 cm2 2.61   Tract/undermine JENNIFER due to slough None     Procedures:     Debridement: cswd using forceps/scissors to remove ~0.5cm2 thick slough from wound bed   Cleansed with: No rinse foam cleanser to foot & leg, NS to wound after debridement   Periwound protected with: Skin prep, brava strips to ravin wound & over irritation   Primary dressing: nikolas, 1 piece of black foam in the wound bed and 1 piece black foam to accommodate trac pad. (2 black total)   Secondary Dressing: drape to secure, resumed NPWT at 125mmHg, continuous    Other: Tubigrip E single layer, patient's own walking boot.     Patient Education: Discussed plan of care and wound progress. Reviewed troubleshooting wound vac and how to patch a leak. Reviewed s/s infection and when to present to ED. Instructed how to don & remove tubigrip stocking. Reviewed importance of hydration, nutrition, and management of blood sugars for wound healing. Pt and spouse agreeable with plan of care and verbalized understanding to all instruction.     Professional Collaboration: None today    Assessment:      Wound etiology: Surgical, infection.    Wound Progress: No measurement today.  More viable tissue noted.    Rationale for Treatment:  Nikolas to encourage cellular growth with collagen matrix. NPWT to absorb exudate, maintain moist wound environment, contraction, encourage granulation tissue. Tubi for light compression.     Patient tolerance/compliance: Patient tolerated treatment well, agrees with plan of care. Very eager to heal.    Complicating factors: Type II Diabetes, infection.    Need for  ongoing Advanced Wound Care services: Patient requires skilled therapeutic wound care services for product selection, application of product, debridement, close monitoring with clinical assessment to expedite wound healing.     Plan:      Treatment Plan and Recommendations:  Diagnosis/ICD10: S91.331A, E11.9.    Procedures/CPT: cswd 21200    Frequency: 3x/week - 30 minutes      Treatment Goals: STG 2 Weeks  LTG 4 Weeks   Granulation Tissue: 90% 100%   Decrease Necrotic Tissue to: 10% 0%   Wound Phase:  Proliferation Proliferation   Decrease Size by: 10% 40%   Periwound:  Intact Intact   Decrease tracts/undermining by: none none   Decrease Pain:  LOPS LOPS       At the time of each visit a thorough assessment of the patient is completed to assure the  appropriateness of our plan of care.  The dressings or modalities may need to be adapted   from the original plan to address any significant changes in the wound environment.

## 2018-09-19 ENCOUNTER — NON-PROVIDER VISIT (OUTPATIENT)
Dept: WOUND CARE | Facility: MEDICAL CENTER | Age: 48
End: 2018-09-19
Attending: NURSE PRACTITIONER
Payer: COMMERCIAL

## 2018-09-19 PROCEDURE — 97605 NEG PRS WND THER DME<=50SQCM: CPT

## 2018-09-19 NOTE — PATIENT INSTRUCTIONS
Troubleshoot NPWT if leaking and patch any leaks, change cannister if full or non-operational, remove complete black foam and replace with damp gauze if non-operational for more than 2 hours. Call 1-800 number for [employer] if questions during off hours. Manage blood sugars, eat protein, maintain hydration for wound healing.

## 2018-09-21 ENCOUNTER — OFFICE VISIT (OUTPATIENT)
Dept: WOUND CARE | Facility: MEDICAL CENTER | Age: 48
End: 2018-09-21
Attending: NURSE PRACTITIONER
Payer: COMMERCIAL

## 2018-09-21 DIAGNOSIS — E11.9 DIABETES MELLITUS WITH INSULIN THERAPY (HCC): ICD-10-CM

## 2018-09-21 DIAGNOSIS — Z79.4 DIABETES MELLITUS WITH INSULIN THERAPY (HCC): ICD-10-CM

## 2018-09-21 DIAGNOSIS — M86.271 SUBACUTE OSTEOMYELITIS OF RIGHT FOOT (HCC): ICD-10-CM

## 2018-09-21 DIAGNOSIS — Z89.431 S/P TRANSMETATARSAL AMPUTATION OF FOOT, RIGHT (HCC): ICD-10-CM

## 2018-09-21 PROCEDURE — 11042 DBRDMT SUBQ TIS 1ST 20SQCM/<: CPT

## 2018-09-21 PROCEDURE — 11042 DBRDMT SUBQ TIS 1ST 20SQCM/<: CPT | Performed by: NURSE PRACTITIONER

## 2018-09-21 NOTE — PATIENT INSTRUCTIONS
Go to [facility] to check on custom footwear & molds. Keep blood sugars in control for wound healing. Troubleshoot NPWT if leakage or non-functioning.

## 2018-09-24 ENCOUNTER — NON-PROVIDER VISIT (OUTPATIENT)
Dept: WOUND CARE | Facility: MEDICAL CENTER | Age: 48
End: 2018-09-24
Attending: NURSE PRACTITIONER
Payer: COMMERCIAL

## 2018-09-24 PROCEDURE — 97597 DBRDMT OPN WND 1ST 20 CM/<: CPT

## 2018-09-24 PROCEDURE — 97605 NEG PRS WND THER DME<=50SQCM: CPT

## 2018-09-25 NOTE — PROGRESS NOTES
PROCEDURE NOTE        Patient seen in collaboration with wound care provider, Lashell Laguna RN, CWON      HPI:   49 y/o male with DM type II who presents to [facility] with dehisced R transmetatarsal amputation incision. Pt had R great toe ulcer mid June 2018. Went on vacation near [facility]. R great toe ulcer worsened, underwent R great toe ray amputation in [place], the toe was surgically left open. He came back to [place] and presented to [facility] ED 6/30 immediately. During hospital admission from 6/30-7/6/18, pt underwent R TMA and R GSR by Dr. Bernardo on 7/2/18. He discharged with HH and IV abx. He presented to LPS rounds on 7/13 and TMA site had slight incisional necrosis without drainage. By the next week the incision had deteriorated. Wound care orders were updated for Imani BLANCO. He followed up at [facility] for suture removal, and then another post op check mid August. At this point he was referred to [facility].     8/30:Today pt is seen for excisional debridement of R TMA wound prior to placement of NPWT. The periwound in intact, there is thick yellow fibrinous tissue to wound bed. No erythema noted, mild edema  Pt blood glucose levels are averaging 122 over last 40 days, checked this am and it was 119. a1c 13.9%  On 6/30/18  On Bactrim from ID. No N/V/F/C/D  Wearing giovana Boot all of the time  Using Knee scooter, walking short distances in boot     9/21: Wound has decreased in size with NPWT.  Satellite wound has resolved.  15% slough present to the wound bed.  Patient's blood glucose level was 92 this a.m.  He remains on Bactrim DS.  Followed by ID.  Denies fevers, chills, nausea, and vomiting.  Excisional debridement performed.  Wearing slipper, no longer wearing boot.  Awaiting shoe from [facility].    The procedure, rationale for doing so, and the possible risks- including infection, pain and bleeding- have been discussed with the patient.  The patient  verbalized understanding and is agreeable with proceeding.      DESCRIPTION OF  PROCEDURE:  excisional debridement of R TMA wound prior to NPWT placement    PMH, medications and recent labs reviewed    ANESTHESIA:  insensate    PROCEDURE: A formal timeout was performed to confirm patient's correct name, correct site, correct procedure and correct laterality.  Scissors, curette and forceps used to excise slough and senescent red tissue from the wound bed.  Total area debrided, 2.7 cm2.  Debridement carried into the subcutaneous tissue layer. Bleeding controlled with manual pressure.  Wound care including VAC therapy completed by [facility].  Patient tolerated the procedure well, without c/o pain or discomfort.      TMA post debridement:   100% red  Measures 0.9 x 3 x 1.1 cm      Post debridement:                  ASSESSMENT/PLAN:        ICD-10-CM   1. S/P transmetatarsal amputation of foot, right ([facility])  Surgey 7/2/18 for R TMA and R GSR. GSR incision resolved. TMA dehisced, referred to C 8/15  8/30: excisional debridement performed followed by VAC therapy. tubigrip applied for mild compression.  Pt to be seen 1x/wk with provider and 2x/wk with wound RN  Continue to wear offloading boot when out of bed  9/21: Excisional debridement performed followed by Denise to wound base followed by VAC therapy.  Excisional debridement medically necessary to improve wound healing.  Follow-up with [facility] regarding insert for right foot. Z89.431   2. Diabetes mellitus with insulin therapy ([facility])  a1c 13.9% 6/30/18. Blood glucose averaging 120s over last 40 days.  Today's blood glucose 92.  Pt aware to keep blood glucose levels < 150 for improved wound healing. E11.9    Z79.4   3. Subacute osteomyelitis of right foot ([facility])  Followed by [facility]. Had 6wk ertapenem. Now on bactrim DS.  Continue to take Bactrim DS.  Pt advised to go to ER for any increased redness, swelling, drainage or odor, or if he develops fever, chills, nausea or vomiting. M86.271

## 2018-09-25 NOTE — PATIENT INSTRUCTIONS
-Keep your wound dressing clean, dry, and intact.    -Change your dressing if it becomes soiled, soaked, or falls off.    -Monitor for signs and symptoms of infection (increased redness, pain, swelling, drainage, fever, chills, fatigue, malaise, nausea/vomiting). If you have signs and symptoms of infection, call the office or go to Urgent Care/ER.    -Reviewed troubleshooting wound vac and how to patch a leak

## 2018-09-26 ENCOUNTER — NON-PROVIDER VISIT (OUTPATIENT)
Dept: WOUND CARE | Facility: MEDICAL CENTER | Age: 48
End: 2018-09-26
Attending: NURSE PRACTITIONER
Payer: COMMERCIAL

## 2018-09-26 PROCEDURE — 97605 NEG PRS WND THER DME<=50SQCM: CPT

## 2018-09-27 NOTE — PROGRESS NOTES
"Patient reports that the trac pad \"popped off\" this morning while patient was sleeping. He replaced with dressings that he had at home. Discussed that I would place a little more drape tape to help keep the trac pad in place this time. Discussed that wound appears to continue to contract; he is scheduled with provider this Friday. Reinforced how to patch a leak in wound vac; reinforced pt instr ss infection - erythema, edema, localized heat, fever/chills/N+V, when to call MD/go to ER. Pt with good understanding.  "

## 2018-09-27 NOTE — PROCEDURES
Non selective with NS, gauze and cotton tipped applicator to remove non viable tissue from wound bed.

## 2018-09-28 ENCOUNTER — OFFICE VISIT (OUTPATIENT)
Dept: WOUND CARE | Facility: MEDICAL CENTER | Age: 48
End: 2018-09-28
Attending: NURSE PRACTITIONER
Payer: COMMERCIAL

## 2018-09-28 VITALS
RESPIRATION RATE: 20 BRPM | OXYGEN SATURATION: 97 % | SYSTOLIC BLOOD PRESSURE: 140 MMHG | HEART RATE: 95 BPM | TEMPERATURE: 98.7 F | DIASTOLIC BLOOD PRESSURE: 97 MMHG

## 2018-09-28 DIAGNOSIS — E11.9 DIABETES MELLITUS WITH INSULIN THERAPY (HCC): ICD-10-CM

## 2018-09-28 DIAGNOSIS — Z89.431 S/P TRANSMETATARSAL AMPUTATION OF FOOT, RIGHT (HCC): ICD-10-CM

## 2018-09-28 DIAGNOSIS — M86.271 SUBACUTE OSTEOMYELITIS OF RIGHT FOOT (HCC): ICD-10-CM

## 2018-09-28 DIAGNOSIS — Z79.4 DIABETES MELLITUS WITH INSULIN THERAPY (HCC): ICD-10-CM

## 2018-09-28 PROCEDURE — 11042 DBRDMT SUBQ TIS 1ST 20SQCM/<: CPT | Performed by: NURSE PRACTITIONER

## 2018-09-28 PROCEDURE — 11042 DBRDMT SUBQ TIS 1ST 20SQCM/<: CPT

## 2018-09-28 NOTE — NON-PROVIDER
VAC break over the weekend per pt request. Instructed pt to keep dressing CDI, change with sterile dry dressing only if it gets wet or falls off. Pt and spouse verbalized understanding.

## 2018-09-28 NOTE — PROGRESS NOTES
PROCEDURE NOTE        Patient seen in collaboration with wound care provider,   Dianna Chacon RN        HPI:   47 y/o male with DM type II who presents to [facility] with dehisced R transmetatarsal amputation incision. Pt had R great toe ulcer mid June 2018. Went on vacation near [facility]. R great toe ulcer worsened, underwent R great toe ray amputation in [place], the toe was surgically left open. He came back to [place] and presented to [facility] ED 6/30 immediately. During hospital admission from 6/30-7/6/18, pt underwent R TMA and R GSR by Dr. Bernardo on 7/2/18. He discharged with HH and IV abx. He presented to LPS rounds on 7/13 and TMA site had slight incisional necrosis without drainage. By the next week the incision had deteriorated. Wound care orders were updated for Imani BLANCO. He followed up at [facility] for suture removal, and then another post op check mid August. At this point he was referred to [facility].     8/30:Today pt is seen for excisional debridement of R TMA wound prior to placement of NPWT. The periwound in intact, there is thick yellow fibrinous tissue to wound bed. No erythema noted, mild edema  Pt blood glucose levels are averaging 122 over last 40 days, checked this am and it was 119. a1c 13.9%  On 6/30/18  On Bactrim from ID. No N/V/F/C/D  Wearing giovana Boot all of the time  Using Knee scooter, walking short distances in boot     9/21: Wound has decreased in size with NPWT.  Satellite wound has resolved.  15% slough present to the wound bed.  Patient's blood glucose level was 92 this a.m.  He remains on Bactrim DS.  Followed by ID.  Denies fevers, chills, nausea, and vomiting.  Excisional debridement performed.  Wearing slipper, no longer wearing boot.  Awaiting shoe from [employer].    9/28: TMA wound continues to decrease in size from last week with VAC. Pt requesting VAC break this weekend for an event he is attending. blood glucose was 119 this am. He received new insert from [employer] for R foot yesterday. Remains on  "bactrim DS, tolerating. Denies N/V/F/C.    The procedure, rationale for doing so, and the possible risks- including infection, pain and bleeding- have been discussed with the patient.  The patient  verbalized understanding and is agreeable with proceeding.      DESCRIPTION OF PROCEDURE:  excisional debridement of R TMA wound     PMH, medications and recent labs reviewed    ANESTHESIA:  insensate    PROCEDURE: A formal timeout was performed to confirm patient's correct name, correct site, correct procedure and correct laterality.  curette and forceps used to excise slough and senescent red tissue from the wound bed.  Total area debrided, 1.25cm2.  Debridement carried into the subcutaneous tissue layer. Bleeding controlled with manual pressure.  Wound care including VAC therapy completed by [facility].  Patient tolerated the procedure well, without c/o pain or discomfort.      TMA post debridement:   100% red  Measures 0.5 x 2.5 x 0.8 cm      Post debridement:                    ASSESSMENT/PLAN:        ICD-10-CM   1. S/P transmetatarsal amputation of foot, right (HCC)  Surgey 7/2/18 for R TMA and R GSR. GSR incision resolved. TMA dehisced, referred to C 8/15  8/30: excisional debridement performed followed by VAC therapy. tubigrip applied for mild compression.  Pt to be seen 1x/wk with provider and 2x/wk with wound RN  Continue to wear offloading boot when out of bed  9/21: Excisional debridement performed followed by Denise to wound base followed by VAC therapy.  Excisional debridement medically necessary to improve wound healing.  Follow-up with ability regarding insert for right foot.  9/28: wound continues to decrease in size. excisional debridement performed today, medically necessary for wound healing. VAC held this weekend for pt's event. Will re-evaluate at next visit. Continue with VAC until wound depth <0.5cm.  Denise applied today, hydrofiber, adhesive foam, tubigrip D  -reminded pt to \"wean\" into new diabetic " shoe and insert and to continue to check feet for any new calluses, blisters, wounds.  Z89.431   2. Diabetes mellitus with insulin therapy (HCC)  a1c 13.9% 6/30/18. Blood glucose averaging 120s over last 40 days.  Today's blood glucose 119.  Pt aware to keep blood glucose levels < 150 for improved wound healing. E11.9    Z79.4   3. Subacute osteomyelitis of right foot (HCC)  Followed by [facility]. Had 6wk ertapenem. Now on bactrim DS.  Continue to take Bactrim DS.  Pt advised to go to ER for any increased redness, swelling, drainage or odor, or if he develops fever, chills, nausea or vomiting. M86.963

## 2018-10-01 ENCOUNTER — NON-PROVIDER VISIT (OUTPATIENT)
Dept: WOUND CARE | Facility: MEDICAL CENTER | Age: 48
End: 2018-10-01
Attending: NURSE PRACTITIONER
Payer: COMMERCIAL

## 2018-10-01 PROCEDURE — 97597 DBRDMT OPN WND 1ST 20 CM/<: CPT

## 2018-10-01 NOTE — PROGRESS NOTES
Vac held at last visit (9/28/18) due to pt having an event over the weekend.  Continue vac hold today as wound doing well.  Starting to epithelialize at medial edge.  Discussed with ELVIE Medley.  Pt to bring vac supplies to subsequent visits until final decision made whether the vac will be reapplied or not.  Scheduled with provider on 10/5/18.

## 2018-10-03 ENCOUNTER — NON-PROVIDER VISIT (OUTPATIENT)
Dept: WOUND CARE | Facility: MEDICAL CENTER | Age: 48
End: 2018-10-03
Attending: NURSE PRACTITIONER
Payer: COMMERCIAL

## 2018-10-03 PROCEDURE — 97597 DBRDMT OPN WND 1ST 20 CM/<: CPT

## 2018-10-03 NOTE — PATIENT INSTRUCTIONS
Discussed POC, wound care rationale, dressing selection and to return to [facility] twice weekly for appts. Pt instructed to keep dressing CDI and to return to ER for any s/s infection, n/v, fever or chills. Pt verbalized understanding to all.

## 2018-10-03 NOTE — PROGRESS NOTES
Discontinue vac today.  Advised to return to [facility].  ELVIE Medley in room and in agreement with poc.

## 2018-10-03 NOTE — PROCEDURES
cswd using forceps and scissors to remove ~1cm2 slough from wound bed and non-viable material from wound margin.

## 2018-10-05 ENCOUNTER — OFFICE VISIT (OUTPATIENT)
Dept: WOUND CARE | Facility: MEDICAL CENTER | Age: 48
End: 2018-10-05
Attending: NURSE PRACTITIONER
Payer: COMMERCIAL

## 2018-10-05 VITALS
HEART RATE: 96 BPM | TEMPERATURE: 97.9 F | RESPIRATION RATE: 18 BRPM | DIASTOLIC BLOOD PRESSURE: 96 MMHG | SYSTOLIC BLOOD PRESSURE: 144 MMHG | OXYGEN SATURATION: 100 %

## 2018-10-05 DIAGNOSIS — Z89.431 S/P TRANSMETATARSAL AMPUTATION OF FOOT, RIGHT (HCC): Primary | ICD-10-CM

## 2018-10-05 DIAGNOSIS — M86.271 SUBACUTE OSTEOMYELITIS OF RIGHT FOOT (HCC): ICD-10-CM

## 2018-10-05 DIAGNOSIS — E11.65 TYPE 2 DIABETES MELLITUS WITH HYPERGLYCEMIA, WITHOUT LONG-TERM CURRENT USE OF INSULIN (HCC): ICD-10-CM

## 2018-10-05 PROCEDURE — 11042 DBRDMT SUBQ TIS 1ST 20SQCM/<: CPT | Performed by: NURSE PRACTITIONER

## 2018-10-05 PROCEDURE — 11042 DBRDMT SUBQ TIS 1ST 20SQCM/<: CPT

## 2018-10-05 ASSESSMENT — ENCOUNTER SYMPTOMS
CONSTIPATION: 0
FEVER: 0
SHORTNESS OF BREATH: 0
COUGH: 0
DIARRHEA: 0
VOMITING: 0
NAUSEA: 0
CHILLS: 0
CLAUDICATION: 0

## 2018-10-05 NOTE — PROGRESS NOTES
Provider Note    Patient seen in collaboration with wound care clinician,  Maritza Woody RN    HISTORY OF PRESENT ILLNESS: 47 y/o male with DM type II who presents to [facility] with dehisced R transmetatarsal amputation incision. Pt had R great toe ulcer mid June 2018. Went on vacation near [facility]. R great toe ulcer worsened, underwent R great toe ray amputation in [place], the toe was surgically left open. He came back to [place] and presented to [facility] ED 6/30 immediately. During hospital admission from 6/30-7/6/18, pt underwent R TMA and R GSR by Dr. Bernardo on 7/2/18. He discharged with HH and IV abx. He presented to LPS rounds on 7/13 and TMA site had slight incisional necrosis without drainage. By the next week the incision had deteriorated. Wound care orders were updated for Imani BLANCO. He followed up at [facility] for suture removal, and then another post op check mid August. At this point he was referred to [facility].     8/30:Today pt is seen for excisional debridement of R TMA wound prior to placement of NPWT. The periwound in intact, there is thick yellow fibrinous tissue to wound bed. No erythema noted, mild edema  Pt blood glucose levels are averaging 122 over last 40 days, checked this am and it was 119. a1c 13.9%  On 6/30/18  On Bactrim from ID. No N/V/F/C/D  Wearing giovana Boot all of the time  Using Knee scooter, walking short distances in boot      9/21: Wound has decreased in size with NPWT.  Satellite wound has resolved.  15% slough present to the wound bed.  Patient's blood glucose level was 92 this a.m.  He remains on Bactrim DS.  Followed by ID.  Denies fevers, chills, nausea, and vomiting.  Excisional debridement performed.  Wearing slipper, no longer wearing boot.  Awaiting shoe from [facility].     9/28: TMA wound continues to decrease in size from last week with VAC. Pt requesting VAC break this weekend for an event he is attending. blood glucose was 119 this am. He received new insert from [facility] for R foot  yesterday. Remains on bactrim DS, tolerating. Denies N/V/F/C.    10/5: TMA wound continues to decrease in area.  VAC has been DC'd.  Excisional debridement of wound today in clinic.  Blood sugars averaging around 125 per patient.  He is planning on going hunting this weekend, will place orthotic insert in his hunting boot.    LATEST A1c:  13.9%  On 6/30/18      PAST MEDICAL HISTORY:   Past Medical History:   Diagnosis Date   • Diabetes (HCC)        PAST SURGICAL HISTORY:   Past Surgical History:   Procedure Laterality Date   • TOE AMPUTATION Right 7/2/2018    Procedure: Transmetatarsal amputation;  Surgeon: Ravi Bernardo M.D.;  Location: SURGERY [facility];  Service: Orthopedics   • ACHILLES TENDON REPAIR Right 7/2/2018    Procedure: ACHILLES LENGTHENING;  Surgeon: Ravi Bernardo M.D.;  Location: SURGERY [facility];  Service: Orthopedics   • IRRIGATION & DEBRIDEMENT ORTHO Right 7/2/2018    Procedure: IRRIGATION & DEBRIDEMENT ORTHO;  Surgeon: Ravi Bernardo M.D.;  Location: SURGERY [facility];  Service: Orthopedics   • OTHER     • OTHER ABDOMINAL SURGERY     • OTHER ORTHOPEDIC SURGERY          MEDICATIONS:   Current Outpatient Prescriptions   Medication   • sulfamethoxazole-trimethoprim (BACTRIM DS) 800-160 MG tablet   • lisinopril (PRINIVIL, ZESTRIL) 40 MG tablet   • insulin glargine (LANTUS) 100 UNIT/ML Solution   • glimepiride (AMARYL) 4 MG Tab   • TRULICITY 1.5 MG/0.5ML Solution Pen-injector   • omeprazole (PRILOSEC) 20 MG delayed-release capsule     No current facility-administered medications for this visit.        ALLERGIES:  No Known Allergies      SOCIAL HISTORY:   Social History     Social History   • Marital status:      Spouse name: N/A   • Number of children: N/A   • Years of education: N/A     Social History Main Topics   • Smoking status: Never Smoker   • Smokeless tobacco: Never Used   • Alcohol use No   • Drug use: No   • Sexual activity: Not on file     Other Topics  Concern   • Not on file     Social History Narrative   • No narrative on file            REVIEW OF SYSTEMS:   Review of Systems   Constitutional: Negative for chills and fever.   Respiratory: Negative for cough and shortness of breath.    Cardiovascular: Negative for chest pain, claudication and leg swelling.   Gastrointestinal: Negative for constipation, diarrhea, nausea and vomiting.   Genitourinary: Negative for dysuria.   Musculoskeletal: Negative for joint pain.   Neurological:        Foot numb       PHYSICAL EXAMINATION:     Physical Exam   Constitutional: He is oriented to person, place, and time and well-developed, well-nourished, and in no distress.   HENT:   Head: Normocephalic.   Eyes: Pupils are equal, round, and reactive to light.   Cardiovascular: Intact distal pulses.    Pulmonary/Chest: Effort normal.   Musculoskeletal: Normal range of motion.   Right TMA   Neurological: He is alert and oriented to person, place, and time.   Right foot insensate to light touch   Skin: Skin is warm.   Open wound to distal TMA, surgical wound dehiscence     Wound assessment  Negative Pressure Wound Therapy Foot Distal;Right (Active)   NPWT Pump Mode / Pressure Setting Other (Comment) 10/1/2018  4:00 PM   Dressing Type Black foam 9/26/2018  1:00 PM   Number of Foam Pieces Used 2 9/26/2018  1:00 PM   Canister Changed No 9/26/2018  1:00 PM     Wound 08/15/18 Incision Foot (Active)   Wound Image    10/5/2018  3:00 PM   Site Assessment Yellow 10/5/2018  3:00 PM   Marta-wound Assessment Intact 10/5/2018  3:00 PM   Margins Attached edges 10/5/2018  3:00 PM   Wound Length (cm) 0.4 cm 10/5/2018  3:00 PM   Wound Width (cm) 1.1 cm 10/5/2018  3:00 PM   Wound Depth (cm) 0.4 cm 10/5/2018  3:00 PM   Wound Surface Area (cm^2) 0.44 cm^2 10/5/2018  3:00 PM   Drainage Amount Small 10/5/2018  3:00 PM   Drainage Description Serosanguineous 10/5/2018  3:00 PM   Non-staged Wound Description Full thickness 10/5/2018  3:00 PM   Treatments  Cleansed;Other (Comment) 10/5/2018  3:00 PM   Cleansing Approved Wound Cleanser 10/5/2018  3:00 PM   Periwound Protectant Skin Protectant wipes to Periwound 10/5/2018  3:00 PM   Dressing Options Collagen Dressing;Hypafix Tape;Nonadhesive Foam;Tubigrip 10/5/2018  3:00 PM   Dressing Cleansing/Solutions Normal Saline 9/26/2018  1:00 PM   Dressing Changed Changed 10/5/2018  3:00 PM   Dressing Status Clean;Dry 10/5/2018  3:00 PM   Dressing Change Frequency Monday, Wednesday, Friday 9/26/2018  1:00 PM   NEXT Dressing Change  09/28/18 9/26/2018  1:00 PM   NEXT Weekly Photo (Inpatient Only) 10/05/18 10/3/2018  1:00 PM   WOUND NURSE ONLY - Odor None 10/5/2018  3:00 PM   WOUND NURSE ONLY - Exposed Structures None 10/5/2018  3:00 PM   WOUND NURSE ONLY - Tissue Type and Percentage 100% yellow adherent PRE-Debridement. 10/5/2018  3:00 PM          Measurements (post-debridement)     DATE: 10/5/2018   Length (cm)  0.5   Width (cm)  1.2   Depth (cm)  0.4   Area (cm2)  0.6   Tract/undermine  none               PROCEDURE:2% viscous lidocaine applied topically to wound bed for approximately 5 minutes prior to debridement  Curette used to debride wound bed.  Excisional debridement was performed to remove devitalized tissue until healthy, bleeding tissue was visualized.   Entire surface of wound, 0.6 cm2, debrided  Tissue debrided into the subcutaneous layer.  Bleeding controlled with manual pressure. Wound care completed by Maritza.    PATIENT EDUCATION  1) Importance of tight glucose control for wound healing discussed with patient 2) Implications of loss of protective sensation (LOPS) discussed with patient- including increased risk for amputation. 3)   Advised to check feet at least daily, moisturize feet, and to always wear protective foot wear.  4) importance of offloading foot in order for wound to heal discussed with patient .       ASSESSMENT AND PLAN:      ICD-10-CM   1. S/P transmetatarsal amputation of foot, right  ([facility])  Surgey 7/2/18 for R TMA and R GSR. GSR incision resolved. TMA dehisced, referred to [facility] 8/15  8/30: excisional debridement performed followed by VAC therapy. tubigrip applied for mild compression.  Pt to be seen 1x/wk with provider and 2x/wk with wound RN  Continue to wear offloading boot when out of bed  9/21: Excisional debridement performed followed by Denise to wound base followed by VAC therapy.  Excisional debridement medically necessary to improve wound   healing.Follow-up with ability regarding insert for right foot.  10/5-excisional debridement in clinic, medically necessary for wound healing.  Wound is progressing, anticipate resolution in 1-2 weeks. Z89.431   2. Diabetes mellitus with insulin therapy ([facility])  a1c 13.9% 6/30/18. Blood glucose averaging 120s over last 40 days.  Today's blood glucose 92.  Pt aware to keep blood glucose levels < 150 for improved wound healing.  10/5-P morenita has established with a new PCP, reports better control of his diabetes, current average 125 E11.9     Z79.4   3. Subacute osteomyelitis of right foot ([facility])  Followed by [facility]. Had 6wk ertapenem. Now on bactrim DS.  Continue to take Bactrim DS.  Pt advised to go to ER for any increased redness, swelling, drainage or odor, or if he develops fever, chills, nausea or vomiting.  10/5-no signs or symptoms of infection

## 2018-10-05 NOTE — PATIENT INSTRUCTIONS
-Keep dressings clean, dry and covered while bathing. Only change dressings if they become over saturated, soiled or fall off.     -Avoid prolonged standing or sitting without elevating your legs.    -Remove your tubigrip if you have severe pain, severe swelling, numbness, color change, or temperature change in your toes. If you need to remove your tubigrip, do so by unrolling them. Do not cut the tubigrip off, this is to prevent cutting yourself on accident.    -Should you experience any significant changes in your wound(s), such as infection (redness, swelling, localized heat, increased pain, fever > 101 F, chills) or have any questions regarding your home care instructions, please contact the wound center at [phone]. If after hours, contact your primary care physician or go to the hospital emergency room.

## 2018-10-08 ENCOUNTER — NON-PROVIDER VISIT (OUTPATIENT)
Dept: WOUND CARE | Facility: MEDICAL CENTER | Age: 48
End: 2018-10-08
Attending: NURSE PRACTITIONER
Payer: COMMERCIAL

## 2018-10-08 PROCEDURE — 97597 DBRDMT OPN WND 1ST 20 CM/<: CPT

## 2018-10-11 ENCOUNTER — OFFICE VISIT (OUTPATIENT)
Dept: INFECTIOUS DISEASES | Facility: MEDICAL CENTER | Age: 48
End: 2018-10-11
Payer: COMMERCIAL

## 2018-10-11 VITALS
WEIGHT: 220.4 LBS | SYSTOLIC BLOOD PRESSURE: 122 MMHG | HEIGHT: 72 IN | BODY MASS INDEX: 29.85 KG/M2 | DIASTOLIC BLOOD PRESSURE: 80 MMHG | TEMPERATURE: 98.4 F | OXYGEN SATURATION: 98 % | HEART RATE: 96 BPM

## 2018-10-11 DIAGNOSIS — M86.271 SUBACUTE OSTEOMYELITIS OF RIGHT FOOT (HCC): ICD-10-CM

## 2018-10-11 DIAGNOSIS — A49.01 MSSA (METHICILLIN SUSCEPTIBLE STAPHYLOCOCCUS AUREUS) INFECTION: ICD-10-CM

## 2018-10-11 DIAGNOSIS — E11.65 TYPE 2 DIABETES MELLITUS WITH HYPERGLYCEMIA, WITHOUT LONG-TERM CURRENT USE OF INSULIN (HCC): ICD-10-CM

## 2018-10-11 PROCEDURE — 99213 OFFICE O/P EST LOW 20 MIN: CPT | Performed by: NURSE PRACTITIONER

## 2018-10-11 RX ORDER — SULFAMETHOXAZOLE AND TRIMETHOPRIM 800; 160 MG/1; MG/1
1 TABLET ORAL 2 TIMES DAILY
Qty: 60 TAB | Refills: 0 | Status: SHIPPED | OUTPATIENT
Start: 2018-10-11 | End: 2018-11-10

## 2018-10-11 RX ORDER — SULFAMETHOXAZOLE AND TRIMETHOPRIM 800; 160 MG/1; MG/1
1 TABLET ORAL 2 TIMES DAILY
COMMUNITY
End: 2019-09-21

## 2018-10-11 NOTE — PROGRESS NOTES
Infectious Disease Clinic    Subjective:     Chief Complaint   Patient presents with   • Follow-Up     Subacute osteomyelitis of right foot (HCC)     Interval History: 48 y.o. white male with history of noninsulin-dependent diabetes.  Approximately 2 weeks prior to admission he had injured his right toe, noting worsening pain and erythema.  He was on vacation in [place], had gone swimming and noticed that his toe seemed to be getting actively worse.  He went to [facility], had an MRI, which showed osteomyelitis and he underwent amputation of the first digit.  He was given oral Bactrim postoperatively; unfortunately, it did not sound like he received proper wound care with wet to dry dressings.  Hospitalized from 6/30-7/6/18, after noticing at home he noticed that the amputation site was deteriorating.  Underwent Right TMA with I&D and GSR on 7/2 with Dr. Bernardo.  OR cx +candida albicans.  Cx from [facility] on 6/29 +MSSA.  Discharged home on IV ertapenem through 8/13/18.    7/18/2018: Seen by ELVIE Zacarias.  Being followed by [facility] 2 times a week for wound care.  Continue IV ertapenem through 8/13/18.    8/13/2018: Seen by ELVIE Zacarias.  Was seen by ELVIE Whittington with [facility] last week, there is concern for part of the surgical TMA site, it is slow to heal.  Patient being referred to [facility] wound clinic, first appointment this Wednesday. Finished IV ertapenem today.  Start p.o. Bactrim tomorrow, plan for 1-3 months of treatment.    9/6/2018: Seen by ELVIE Zacarias. Being seen at the [facility] wound clinic, has a wound VAC in place.  Denies excessive drainage, minimal redness, trace swelling.  Denies any pain. Continue PO Bactrim, plan for 1-3 months based on rate of healing.    Today, 10/11/2018: Continues to tolerate the p.o. Bactrim without issue. Denies feeling generally ill, fevers/chills, general malaise, headache, n/v/d, abdominal pain, chest pain or shortness of breath.  States  that he is seen at the wound care clinic twice a week, no longer has the wound VAC in place.  Denies there being any drainage, odor, redness and very little swelling to the foot.  States that occasionally he will have sharp shooting pains that are a 7 out of 10 from his neuropathy, which resolved quickly on their own.  BS averaging 134.  He has been going out hunting; however, he has been hunting from his truck, he is holding off on doing excessive hiking until the remaining section of the TMA site is well-healed.    ROS  As documented above in my HPI.    Past Medical History:   Diagnosis Date   • Diabetes (HCC)        Social History   Substance Use Topics   • Smoking status: Never Smoker   • Smokeless tobacco: Never Used   • Alcohol use No       Allergies: Patient has no known allergies.    Pt's medication and problem list reviewed.     Objective:     PE:  /80 (BP Location: Left arm, Patient Position: Sitting, BP Cuff Size: Adult)   Pulse 96   Temp 36.9 °C (98.4 °F) (Temporal)   Ht 1.829 m (6')   Wt 100 kg (220 lb 6.4 oz)   SpO2 98%   BMI 29.89 kg/m²      Vital signs reviewed    Constitutional: Appears well-developed and well-nourished. No acute distress.  Speech fluent.    Eyes: Conjunctivae normal and EOM are normal. Pupils are equal, round, and reactive to light.   Neck: Trachea midline. Normal range of motion. No JVD.    Cardiovascular: Normal rate, regular rhythm, normal heart sounds. No murmur, gallop or friction rub. Trace RLE edema.  Respiratory: No respiratory distress, unlabored respiratory effort.  Lungs clear to auscultation bilaterally. No wheezes.   Abdomen: Soft, non tender, non-distended. BS + x 4. No masses.   Musculoskeletal: Steady gait.  Slightly limited RLE range of motion.    Dressing in place, WC notes and picture from 10/5 reviewed:  Right TMA- 0.5 x 1.2 x 0.4 cm, wound bed pink, no erythema to surrounding tissue, remaining majority of TMA site is well-healed and  approximated.  Skin: Warm and dry. No visible rashes or lesions.  Neurological: No cranial nerve deficit. Coordination normal.  Decreased RLE sensation.  Psychiatric: Alert and oriented to person, place, and time. Normal mood, calm affect.      Labs from [facility] on 10/3/18:  WBC 5.9  Hemoglobin 14.8  Hematocrit 42.3  Platelet 254  Glucose 146  BUN 19  Creatinine 1.16  Sodium 139  Potassium 4.6  Alk phos 48  AST 26  ALT 23    Assessment and Plan:   The following treatment plan was discussed with patient at length:    1. Subacute osteomyelitis of right foot (HCC)  sulfamethoxazole-trimethoprim (BACTRIM DS) 800-160 MG tablet    CBC WITHOUT DIFFERENTIAL    COMP METABOLIC PANEL    WESTERGREN SED RATE    -Continue p.o. Bactrim, plan for 1 additional month.  -CBC, CMP and ESR to monitor for leukocytosis, thrombocytopenia, hepato-or nephrotoxicity and trend inflammatory markers.   2. MSSA (methicillin susceptible Staphylococcus aureus) infection  sulfamethoxazole-trimethoprim (BACTRIM DS) 800-160 MG tablet    As above.   3. Type 2 diabetes mellitus with hyperglycemia, without long-term current use of insulin ([facility])      HgA1C 13.6% on 6/30/18.  Continue to monitor blood sugars closely as DM will impair wound healing and can worsen infection.     Follow up: 4 weeks, RTC sooner if needed. FU with PCP for ongoing chronic medical conditions.     RANDI Limon.P.R.N.       Please note that this dictation was created using voice recognition software. I have  worked with technical experts from [facility]  [employer] to optimize the interface.  I have made every reasonable attempt to correct obvious errors, but there may be errors of grammar and possibly content that I did not discover before finalizing the note.

## 2018-10-12 ENCOUNTER — NON-PROVIDER VISIT (OUTPATIENT)
Dept: WOUND CARE | Facility: MEDICAL CENTER | Age: 48
End: 2018-10-12
Attending: NURSE PRACTITIONER
Payer: COMMERCIAL

## 2018-10-12 PROCEDURE — 97597 DBRDMT OPN WND 1ST 20 CM/<: CPT

## 2018-10-12 NOTE — PATIENT INSTRUCTIONS
-Keep your wound dressing clean, dry, and intact.    -Change your dressing if it becomes soiled, soaked, or falls off.    -Should you experience any significant changes in your wound(s), such as infection (redness, swelling, localized heat, increased pain, fever > 101 F, chills) or have any questions regarding your home care instructions, please contact the wound center at [phone]. If after hours, contact your primary care physician or go to the hospital emergency room.

## 2018-10-15 ENCOUNTER — NON-PROVIDER VISIT (OUTPATIENT)
Dept: WOUND CARE | Facility: MEDICAL CENTER | Age: 48
End: 2018-10-15
Attending: NURSE PRACTITIONER
Payer: COMMERCIAL

## 2018-10-15 PROCEDURE — 97597 DBRDMT OPN WND 1ST 20 CM/<: CPT

## 2018-10-15 NOTE — WOUND TEAM
Applied NPWT (negative pressure wound therapy) to right TMA wound with brava strips at periwound & drape; and nikolas & black granufoam to wound bed covered with drape to seal at 125 mmHg continuous.

## 2018-10-15 NOTE — PATIENT INSTRUCTIONS
Reviewed POC, importance of keeping the secondary dressing dry and intact, nutrition for wound healing, s/s of complications/infection, when to notify MD/go to ER.  Pt verbalized understanding to all.

## 2018-10-15 NOTE — PROCEDURES
CSWD using scalpel and forceps to remove 1.5cm2 of biofilm, calloused edges, and dried blood.  Pt states that he had a push a stalled truck this weekend and that probably didn't help the wound.  Pt tolerated well procedure well.  No s/s of complications.

## 2018-10-19 ENCOUNTER — NON-PROVIDER VISIT (OUTPATIENT)
Dept: WOUND CARE | Facility: MEDICAL CENTER | Age: 48
End: 2018-10-19
Attending: NURSE PRACTITIONER
Payer: COMMERCIAL

## 2018-10-19 PROCEDURE — 97602 WOUND(S) CARE NON-SELECTIVE: CPT

## 2018-10-19 NOTE — PROCEDURES
Non-selective debridement using moist gauze to remove dried crust & non-viable tissue from wound bed.

## 2018-10-19 NOTE — PATIENT INSTRUCTIONS
Should you experience any significant changes in your wound(s), such as infection (redness, swelling, localized heat, increased pain, fever > 101 F, chills) or have any questions regarding your home care instructions, please contact the wound center at [phone]. If after hours, contact your primary care physician or go to the hospital emergency room.   Keep your dressing clean, dry and cover while bathing. Only change dressing if it becomes over saturated, soiled or falls off.   Keep offloading site & wearing compression stocking.  Manage blood sugars for optimal healing, as well as proper nutrition & hydration.

## 2018-10-22 ENCOUNTER — NON-PROVIDER VISIT (OUTPATIENT)
Dept: WOUND CARE | Facility: MEDICAL CENTER | Age: 48
End: 2018-10-22
Attending: NURSE PRACTITIONER
Payer: COMMERCIAL

## 2018-10-22 PROCEDURE — 97602 WOUND(S) CARE NON-SELECTIVE: CPT

## 2018-11-01 ENCOUNTER — NON-PROVIDER VISIT (OUTPATIENT)
Dept: WOUND CARE | Facility: MEDICAL CENTER | Age: 48
End: 2018-11-01
Attending: NURSE PRACTITIONER
Payer: COMMERCIAL

## 2018-11-01 PROCEDURE — 97602 WOUND(S) CARE NON-SELECTIVE: CPT

## 2018-11-01 NOTE — PATIENT INSTRUCTIONS
Keep dressing clean and dry and cover while bathing. Only change dressing if over saturated, soiled or its falling off.     Should you experience any significant changes in your wound(s) such as infection (redness, swelling, localized heat, increased pain, fever >101 F, chills) or have any questions regarding your home care instructions, please contact the wound center [phone]. If after hours, contact your primary care physician or go the hospital emergency room.

## 2018-11-08 ENCOUNTER — OFFICE VISIT (OUTPATIENT)
Dept: INFECTIOUS DISEASES | Facility: MEDICAL CENTER | Age: 48
End: 2018-11-08
Payer: COMMERCIAL

## 2018-11-08 VITALS
WEIGHT: 221.8 LBS | SYSTOLIC BLOOD PRESSURE: 118 MMHG | HEART RATE: 95 BPM | DIASTOLIC BLOOD PRESSURE: 80 MMHG | BODY MASS INDEX: 30.04 KG/M2 | OXYGEN SATURATION: 96 % | HEIGHT: 72 IN | TEMPERATURE: 97.4 F

## 2018-11-08 DIAGNOSIS — M86.271 SUBACUTE OSTEOMYELITIS OF RIGHT FOOT (HCC): ICD-10-CM

## 2018-11-08 DIAGNOSIS — A49.01 MSSA (METHICILLIN SUSCEPTIBLE STAPHYLOCOCCUS AUREUS) INFECTION: ICD-10-CM

## 2018-11-08 DIAGNOSIS — Z89.431 S/P TRANSMETATARSAL AMPUTATION OF FOOT, RIGHT (HCC): ICD-10-CM

## 2018-11-08 DIAGNOSIS — E11.65 TYPE 2 DIABETES MELLITUS WITH HYPERGLYCEMIA, WITHOUT LONG-TERM CURRENT USE OF INSULIN (HCC): ICD-10-CM

## 2018-11-08 PROCEDURE — 99213 OFFICE O/P EST LOW 20 MIN: CPT | Performed by: NURSE PRACTITIONER

## 2018-11-08 NOTE — PROGRESS NOTES
Infectious Disease Clinic    Subjective:     Chief Complaint   Patient presents with   • Follow-Up     Subacute osteomyelitis of right foot     Interval History: 48 y.o. white male with history of noninsulin-dependent diabetes.  Approximately 2 weeks prior to admission he had injured his right toe, noting worsening pain and erythema.  He was on vacation in [place], had gone swimming and noticed that his toe seemed to be getting actively worse.  He went to [facility], had an MRI, which showed osteomyelitis and he underwent amputation of the first digit.  He was given oral Bactrim postoperatively; unfortunately, it did not sound like he received proper wound care with wet to dry dressings.  Hospitalized from 6/30-7/6/18, after noticing at home he noticed that the amputation site was deteriorating.  Underwent Right TMA with I&D and [facility] on 7/2 with Dr. Bernardo.  OR cx +candida albicans.  Cx from [facility] on 6/29 +MSSA.  Discharged home on IV ertapenem through 8/13/18.    7/18/2018: Seen by ELVIE Zacarias.  Being followed by [facility] 2 times a week for wound care.  Continue IV ertapenem through 8/13/18.    8/13/2018: Seen by ELVIE Zacarias.  Was seen by ELVIE Whittington with [facility] last week, there is concern for part of the surgical TMA site, it is slow to heal.  Patient being referred to [facility] wound clinic, first appointment this Wednesday. Finished IV ertapenem today.  Start p.o. Bactrim tomorrow, plan for 1-3 months of treatment.    9/6/2018: Seen by ELVIE Zacarias. Being seen at the [facility] wound clinic, has a wound VAC in place.  Denies excessive drainage, minimal redness, trace swelling.  Denies any pain. Continue PO Bactrim, plan for 1-3 months based on rate of healing.    10/11/2018: Seen by ELVIE Zacarias.  States that he is seen at the wound care clinic twice a week, no longer has the wound VAC in place.  Denies there being any drainage, odor, redness and very little swelling to  the foot. He has been going out hunting; however, he has been hunting from his truck, he is holding off on doing excessive hiking until the remaining section of the TMA site is well-healed.  Continue p.o. Bactrim, plan for 1 additional month.     Today, 11/8/2018:  Continues to tolerate the p.o. Bactrim with minimal issue.  Denies feeling generally ill, fevers/chills, general malaise, headache, n/v/d, abdominal pain, chest pain or shortness of breath.  Being seen at wound care once every 1-2 weeks, notes that the TMA site is scabbed over and nearly healed.  He denies there being any active drainage, no redness and denies any active pain.  Continues to have some occasional sharp shooting pains from the neuropathy.  Says he was seen by his PCP, hemoglobin A1c was checked this morning was down to 5.9%.  BS was 93 fasting, on average it is 129.    ROS  As documented above in my HPI.    Past Medical History:   Diagnosis Date   • Diabetes (HCC)        Social History   Substance Use Topics   • Smoking status: Never Smoker   • Smokeless tobacco: Never Used   • Alcohol use No       Allergies: Patient has no known allergies.    Pt's medication and problem list reviewed.     Objective:     PE:  /80   Pulse 95   Temp 36.3 °C (97.4 °F) (Temporal)   Ht 1.829 m (6')   Wt 100.6 kg (221 lb 12.8 oz)   SpO2 96%   BMI 30.08 kg/m²      Vital signs reviewed    Constitutional: Appears well-developed and well-nourished. No acute distress.  Speech fluent.    Eyes: Conjunctivae normal and EOM are normal. PERRL.  Neck: Trachea midline. Normal range of motion. No JVD.  Neck supple.  Cardiovascular: Normal rate, regular rhythm, normal heart sounds. No murmur. Trace RLE edema.  Respiratory: No respiratory distress, unlabored respiratory effort.  Lungs clear to auscultation bilaterally. No wheezes or rales.   Abdomen: Soft, non tender, non-distended. BS + x 4. No masses.   Musculoskeletal: Steady gait.  Normal RLE range of motion.     Right TMA- well healed and approximated with a small, thin scab to mid incision, no openings or drainage, no swelling or erythema, non tender to palpation.  Skin: Warm and dry. No visible rashes or lesions.  Neurological: No cranial nerve deficit. Coordination normal.  Decreased RLE sensation.  Psychiatric: Alert and oriented to person, place, and time. Normal mood, calm affect.      Assessment and Plan:   The following treatment plan was discussed with patient at length:    1. Subacute osteomyelitis of right foot (HCC)      Finish p.o. Bactrim as directed.  No additional antibiotics to follow.  Monitor for s/sx of worsening off abx: increased redness, pain, swelling, drainage, breakdown of surgical site, fevers, chills, general malaise, etc.  Notify ID or go to ER should these s/sx occur.   2. MSSA (methicillin susceptible Staphylococcus aureus) infection      As above.   3. S/P transmetatarsal amputation of foot, right (HCC)      As above.   4. Type 2 diabetes mellitus with hyperglycemia, without long-term current use of insulin ([facility])      HgA1C 13.6% on 6/30/18.  Reports it being down to 5.9% today  Continue to monitor blood sugars closely as DM will impair wound healing and can worsen infection.     Follow up: PRN, RTC sooner if needed. FU with PCP for ongoing chronic medical conditions.     RANDI Limon.P.R.N.       Please note that this dictation was created using voice recognition software. I have  worked with technical experts from [facility] to optimize the interface.  I have made every reasonable attempt to correct obvious errors, but there may be errors of grammar and possibly content that I did not discover before finalizing the note.

## 2018-11-15 ENCOUNTER — NON-PROVIDER VISIT (OUTPATIENT)
Dept: WOUND CARE | Facility: MEDICAL CENTER | Age: 48
End: 2018-11-15
Attending: NURSE PRACTITIONER
Payer: COMMERCIAL

## 2018-11-15 PROCEDURE — 99211 OFF/OP EST MAY X REQ PHY/QHP: CPT

## 2018-11-15 NOTE — PATIENT INSTRUCTIONS
Pt instr dc today, keep area clean, it will be fragile for a few days, bathe and dry area gently, as scar tissue only ever regains a maximum of 80% of the tensile strength of the surrounding skin, remodeling of scar can continue for 6mo - a year. Contact PCP for a referral back here if any problems with area opening and draining again. Pt understands  Call [employer] about getting more orthoses (inserts) for shoes so they can be changed 3 x year.

## 2019-01-23 ENCOUNTER — HOSPITAL ENCOUNTER (OUTPATIENT)
Facility: MEDICAL CENTER | Age: 49
End: 2019-01-23
Attending: INTERNAL MEDICINE
Payer: COMMERCIAL

## 2019-01-23 PROCEDURE — 87045 FECES CULTURE AEROBIC BACT: CPT

## 2019-01-23 PROCEDURE — 87899 AGENT NOS ASSAY W/OPTIC: CPT

## 2019-01-23 PROCEDURE — 87106 FUNGI IDENTIFICATION YEAST: CPT

## 2019-01-23 PROCEDURE — 87046 STOOL CULTR AEROBIC BACT EA: CPT

## 2019-01-24 LAB
E COLI SXT1+2 STL IA: NORMAL
SIGNIFICANT IND 70042: NORMAL
SITE SITE: NORMAL
SOURCE SOURCE: NORMAL

## 2019-01-28 LAB
BACTERIA STL CULT: ABNORMAL
BACTERIA STL CULT: ABNORMAL
E COLI SXT1+2 STL IA: ABNORMAL
SIGNIFICANT IND 70042: ABNORMAL
SITE SITE: ABNORMAL
SOURCE SOURCE: ABNORMAL

## 2019-03-20 ENCOUNTER — HOSPITAL ENCOUNTER (OUTPATIENT)
Dept: RADIOLOGY | Facility: MEDICAL CENTER | Age: 49
End: 2019-03-20
Attending: INTERNAL MEDICINE
Payer: COMMERCIAL

## 2019-03-20 DIAGNOSIS — R19.7 DIARRHEA OF PRESUMED INFECTIOUS ORIGIN: ICD-10-CM

## 2019-03-20 DIAGNOSIS — E10.8 DIABETES MELLITUS TYPE 1 WITH COMPLICATIONS (HCC): ICD-10-CM

## 2019-03-20 PROCEDURE — 74177 CT ABD & PELVIS W/CONTRAST: CPT

## 2019-03-20 PROCEDURE — 700117 HCHG RX CONTRAST REV CODE 255: Performed by: INTERNAL MEDICINE

## 2019-03-20 RX ADMIN — IOHEXOL 50 ML: 240 INJECTION, SOLUTION INTRATHECAL; INTRAVASCULAR; INTRAVENOUS; ORAL at 10:19

## 2019-03-20 RX ADMIN — IOHEXOL 100 ML: 350 INJECTION, SOLUTION INTRAVENOUS at 10:30

## 2019-03-28 ENCOUNTER — HOSPITAL ENCOUNTER (OUTPATIENT)
Dept: RADIOLOGY | Facility: MEDICAL CENTER | Age: 49
End: 2019-03-28
Attending: FAMILY MEDICINE
Payer: COMMERCIAL

## 2019-03-28 DIAGNOSIS — S61.111A LACERATION OF RIGHT THUMB WITHOUT FOREIGN BODY WITH DAMAGE TO NAIL, INITIAL ENCOUNTER: ICD-10-CM

## 2019-03-28 PROCEDURE — 73140 X-RAY EXAM OF FINGER(S): CPT | Mod: RT

## 2019-05-21 ENCOUNTER — NON-PROVIDER VISIT (OUTPATIENT)
Dept: WOUND CARE | Facility: MEDICAL CENTER | Age: 49
End: 2019-05-21
Attending: FAMILY MEDICINE
Payer: COMMERCIAL

## 2019-05-21 ENCOUNTER — HOSPITAL ENCOUNTER (EMERGENCY)
Facility: MEDICAL CENTER | Age: 49
End: 2019-05-21
Attending: EMERGENCY MEDICINE
Payer: COMMERCIAL

## 2019-05-21 VITALS
DIASTOLIC BLOOD PRESSURE: 99 MMHG | TEMPERATURE: 97.7 F | HEART RATE: 88 BPM | HEIGHT: 72 IN | BODY MASS INDEX: 31.17 KG/M2 | RESPIRATION RATE: 18 BRPM | OXYGEN SATURATION: 97 % | SYSTOLIC BLOOD PRESSURE: 153 MMHG | WEIGHT: 230.16 LBS

## 2019-05-21 DIAGNOSIS — H53.8 BLURRY VISION, LEFT EYE: ICD-10-CM

## 2019-05-21 PROCEDURE — 97597 DBRDMT OPN WND 1ST 20 CM/<: CPT

## 2019-05-21 PROCEDURE — 99284 EMERGENCY DEPT VISIT MOD MDM: CPT

## 2019-05-21 ASSESSMENT — LIFESTYLE VARIABLES: DO YOU DRINK ALCOHOL: NO

## 2019-05-21 NOTE — CERTIFICATION
Non Provider Encounter- Full Thickness wound    HISTORY OF PRESENT ILLNESS        START OF CARE IN CLINIC: 05/21/19    REFERRING PROVIDER: Nolan Buchanan DO     WOUND- Full thickness wound   LOCATION: Right thumb   WOUND HISTORY: Pt was using a table saw in late March 2019 and cut his thumb.  It was initially sutured closed.  70% has healed.  30% of original area presents today with eschar in place.      PERTINENT PMH: DM, hx nonhealing ulcer, htn      IMAGING: Xray 3/28/19   VASCULAR STUDIES: n/a    LAST  WOUND CULTURE:  DATE :  n/a               DIABETES: Yes  MOST RECENT A1C:  Per pt report, most recent A1c was 5.1.  Not noted in epic.    TOBACCO USE: none    RESULTS:     3/28/2019 2:57 PM    HISTORY/REASON FOR EXAM:  Laceration right thumb chainsaw accident.      TECHNIQUE/EXAM DESCRIPTION AND NUMBER OF VIEWS:   3 views of the RIGHT fingers.    COMPARISON: None    FINDINGS:  Bone mineralization is normal.  Comminuted fracture of the distal tuft of the distal phalanx of the first digit is noted with punctate fragments measuring less than 1 mm located in the adjacent soft tissues. No other fractures are identified. There is no   evidence of dislocation.  There is no evidence of arthropathy.  Soft tissue swelling and laceration are noted in the distal first digit.   Impression       1.  Comminuted fracture of tuft of distal phalanx of first digit is identified as described above.       FALL RISK ASSESSMENT: Low fall risk.   65 years or older     Fall within the last 2 years   Uses ambulatory devices  X Loss of protective sensation in feet   Use of prostethic/orthotic    X Presence of lower extremity/foot/toe amputation   X Taking medication that increases risk (per facility policy)      PAST MEDICAL HISTORY:   Past Medical History:   Diagnosis Date   • Diabetes (HCC)        PAST SURGICAL HISTORY:   Past Surgical History:   Procedure Laterality Date   • TOE AMPUTATION Right 7/2/2018    Procedure: Transmetatarsal  amputation;  Surgeon: Ravi Bernardo M.D.;  Location: SURGERY [facility];  Service: Orthopedics   • ACHILLES TENDON REPAIR Right 7/2/2018    Procedure: ACHILLES LENGTHENING;  Surgeon: Ravi Bernardo M.D.;  Location: SURGERY [facility];  Service: Orthopedics   • IRRIGATION & DEBRIDEMENT ORTHO Right 7/2/2018    Procedure: IRRIGATION & DEBRIDEMENT ORTHO;  Surgeon: Ravi Bernardo M.D.;  Location: SURGERY [facility];  Service: Orthopedics   • OTHER     • OTHER ABDOMINAL SURGERY     • OTHER ORTHOPEDIC SURGERY          MEDICATIONS:   Current Outpatient Prescriptions   Medication   • sulfamethoxazole-trimethoprim (BACTRIM DS) 800-160 MG tablet   • lisinopril (PRINIVIL, ZESTRIL) 40 MG tablet   • insulin glargine (LANTUS) 100 UNIT/ML Solution   • glimepiride (AMARYL) 4 MG Tab   • TRULICITY 1.5 MG/0.5ML Solution Pen-injector   • omeprazole (PRILOSEC) 20 MG delayed-release capsule     No current facility-administered medications for this visit.        ALLERGIES:  No Known Allergies      SOCIAL HISTORY:   Social History     Social History   • Marital status:      Spouse name: N/A   • Number of children: N/A   • Years of education: N/A     Social History Main Topics   • Smoking status: Never Smoker   • Smokeless tobacco: Never Used   • Alcohol use No   • Drug use: No   • Sexual activity: Not on file     Other Topics Concern   • Not on file     Social History Narrative   • No narrative on file       FAMILY HISTORY:   Family History   Problem Relation Age of Onset   • No Known Problems Mother    • Diabetes Father    • Heart Disease Father        Wound Assessment:      Wound 05/21/19 Traumatic right thumb (Active)   Wound Image  see below 5/21/2019  8:00 AM   Site Assessment Grey, silver nitrate 5/21/2019  8:00 AM   Marta-wound Assessment Intact;Edema;Mild maceration 5/21/2019  8:00 AM   Margins Attached edges 5/21/2019  8:00 AM   Wound Length (cm) 4 cm 5/21/2019  8:00 AM   Wound Width (cm) 1.5 cm  5/21/2019  8:00 AM   Wound Depth (cm) Unable to assess 5/21/2019  8:00 AM   Wound Surface Area (cm^2) 6 cm^2 5/21/2019  8:00 AM   Post Wound Length (cm) 1.5 cm 5/21/2019  8:00 AM    Post Wound Width (cm) 0.8 cm 5/21/2019  8:00 AM   Post Wound Depth (cm) Unable to assess 5/21/2019  8:00 AM   Post Wound Surface Area (cm^2) 1.2 cm^2 5/21/2019  8:00 AM   Tunneling None noted; JENNIFER fully due to bleeding/AgNO3 post debridement 5/21/2019  8:00 AM   Undermining None noted; JENNIFER fully due to bleeding/AgNO3 post debridement 5/21/2019  8:00 AM   Closure Secondary intention 5/21/2019  8:00 AM   Drainage Amount None 5/21/2019  8:00 AM   Drainage Description n/a 5/21/2019  8:00 AM   Non-staged Wound Description Full thickness 5/21/2019  8:00 AM   Treatments Cleansed;conservative sharp wound debridement 5/21/2019  8:00 AM   Cleansing Approved Wound Cleanser 5/21/2019  8:00 AM   Periwound Protectant Skin Protectant wipes to Periwound 5/21/2019  8:00 AM   Dressing Options Hydrofiber Silver;Nonadhesive Foam;Hypafix Tape 5/21/2019  8:00 AM   Dressing Cleansing/Solutions Normal Saline 5/21/2019  8:00 AM   Dressing Changed New 5/21/2019  8:00 AM   Dressing Status Clean;Dry;Intact 5/21/2019  8:00 AM   Dressing Change Frequency Daily 5/21/2019  8:00 AM   WOUND NURSE ONLY - Odor None 5/21/2019  8:00 AM   WOUND NURSE ONLY - Exposed Structures None noted; JENNIFER fully due to bleeding/AgNO3 post debridement 5/21/2019  8:00 AM   WOUND NURSE ONLY - Tissue Type and Percentage post: 100% grey after AgNO3 5/21/2019  8:00 AM     Pre-debridement Photo:      Procedures:    Conservative sharp wound debridement using forceps and scissors to remove ~3cm2 thick eschar obscuring wound bed.  Silver nitrate applied to control bleeding.    Post-debridement Photo:        PATIENT EDUCATION  -Advised to go to ER for any increased redness, swelling, drainage or odor, or if patient develops fever, chills, nausea or vomiting.  -Importance of adequate nutrition for  wound healing  -Increase protein intake (unless contraindicated by renal status)  -Change dressing daily and if it becomes soiled, soaked, or falls off.    Next wound clinic appt in 2 weeks (6/4/19).  Pt and spouse able to care for wound at home.

## 2019-05-21 NOTE — PROCEDURES
CSWD using forceps and scissors to remove ~3cm2 thick eschar obscuring wound bed.  Pt tolerated well, reported minimal pain.   SUBJECTIVE:                                                      Madhuri Patton is a 4 year old male, here for a routine health maintenance visit.    Patient was roomed by: Kristie Fernández Child     Social History  Patient accompanied by:  Mother and brother  Questions or concerns?: No    Forms to complete? No  Child lives with::  Mother, father, brother and sisters  Who takes care of your child?:  School  Languages spoken in the home:  English and Burmese  Recent family changes/ special stressors?:  None noted    Safety / Health Risk  Is your child around anyone who smokes?  No    TB Exposure:     YES, contact with confirmed or suspected contagious case    Car seat or booster in back seat?  Yes  Helmet worn for bicycle/roller blades/skateboard?  NO    Home Safety Survey:      Firearms in the home?: No       Child ever home alone?  No    Daily Activities    Diet and Exercise     Child gets at least 4 servings fruit or vegetables daily: Yes    Consumes beverages other than lowfat white milk or water: YES       Other beverages include: more than 4 oz of juice per day    Dairy/calcium sources: whole milk, yogurt and cheese    Calcium servings per day: 2    Child gets at least 60 minutes per day of active play: Yes    TV in child's room: No    Sleep       Sleep concerns: no concerns- sleeps well through night     Bedtime: 18:30     Sleep duration (hours): 11    Elimination  Normal urination    Media     Types of media used: video/dvd/tv    Daily use of media (hours): 2    Activities    Activities: age appropriate activities    Organized/ Team sports: none    School    Name of school: [employer]    Grade level:     School performance: above grade level    Grades: 4    Schooling concerns? no    Days missed current/ last year: 5    Academic problems: no problems in reading, no problems in mathematics, no problems in writing and no learning disabilities     Behavior concerns: no current behavioral concerns in  school    Dental     Water source:  City water    Dental provider: patient has a dental home    Dental exam in last 6 months: Yes     No dental risks      Mom had tested + for PPD 20 yrs ago, Negative CXR.      Dental visit recommended: Yes      Cardiac risk assessment:     Family history (males <55, females <65) of angina (chest pain), heart attack, heart surgery for clogged arteries, or stroke: no    Biological parent(s) with a total cholesterol over 240:  no    VISION :  Testing not done; patient has seen eye doctor in the past 12 months.    HEARING   Right Ear:      1000 Hz RESPONSE- on Level: 40 db (Conditioning sound)   1000 Hz: RESPONSE- on Level:   20 db    2000 Hz: RESPONSE- on Level:   20 db    4000 Hz: RESPONSE- on Level:   20 db     Left Ear:      4000 Hz: RESPONSE- on Level:   20 db    2000 Hz: RESPONSE- on Level:   20 db    1000 Hz: RESPONSE- on Level:   20 db     500 Hz: RESPONSE- on Level: 25 db    Right Ear:    500 Hz: RESPONSE- on Level: 25 db    Hearing Acuity: Pass    Hearing Assessment: normal    MENTAL HEALTH  Social-Emotional screening:     Electronic PSC-17   PSC SCORES 4/4/2019   Inattentive / Hyperactive Symptoms Subtotal 1   Externalizing Symptoms Subtotal 2   Internalizing Symptoms Subtotal 0   PSC - 17 Total Score 3      no followup necessary  No concerns    PROBLEM LIST  Patient Active Problem List   Diagnosis     Macrocephaly     Speech delay     Vision problems     MEDICATIONS  No current outpatient medications on file.      ALLERGY  Allergies   Allergen Reactions     Amoxicillin Hives       IMMUNIZATIONS  Immunization History   Administered Date(s) Administered     DTAP (<7y) 06/23/2014     DTAP-IPV, <7Y 07/25/2018     DTaP / Hep B / IPV 2013, 2013, 2013     HEPA 03/18/2014, 09/14/2016     HepB 2013     Hib (PRP-T) 2013, 2013, 2013, 06/23/2014     Influenza (IIV3) PF 2013     MMR 03/30/2017     MMR/V 08/23/2017     Pneumo Conj 13-V  "(2010&after) 2013, 2013, 2013, 06/23/2014     Rotaviruswilbur, 2-dose 2013, 2013     Varicella 03/18/2014       HEALTH HISTORY SINCE LAST VISIT  No surgery, major illness or injury since last physical exam    ROS  Constitutional, eye, ENT, skin, respiratory, cardiac, GI, MSK, neuro, and allergy are normal except as otherwise noted.    OBJECTIVE:   EXAM  /81   Pulse 91   Temp 97.4  F (36.3  C) (Oral)   Ht 4' 0.23\" (1.225 m)   Wt 54 lb (24.5 kg)   BMI 16.32 kg/m    91 %ile based on CDC (Boys, 2-20 Years) Stature-for-age data based on Stature recorded on 4/4/2019.  86 %ile based on CDC (Boys, 2-20 Years) weight-for-age data based on Weight recorded on 4/4/2019.  74 %ile based on CDC (Boys, 2-20 Years) BMI-for-age based on body measurements available as of 4/4/2019.  Blood pressure percentiles are 97 % systolic and >99 % diastolic based on the August 2017 AAP Clinical Practice Guideline. This reading is in the Stage 1 hypertension range (BP >= 95th percentile).  GENERAL: Active, alert, in no acute distress.  SKIN: Clear. No significant rash, abnormal pigmentation or lesions  HEAD: Normocephalic.  EYES:  Symmetric light reflex and no eye movement on cover/uncover test. Normal conjunctivae.  RIGHT EAR: erythematous and bulging membrane  LEFT EAR: normal: no effusions, no erythema, normal landmarks  NOSE: purulent rhinorrhea  MOUTH/THROAT: Clear. No oral lesions. Teeth without obvious abnormalities.  NECK: Supple, no masses.  No thyromegaly.  LYMPH NODES: No adenopathy  LUNGS: Clear. No rales, rhonchi, wheezing or retractions  HEART: Regular rhythm. Normal S1/S2. No murmurs. Normal pulses.  ABDOMEN: Soft, non-tender, not distended, no masses or hepatosplenomegaly. Bowel sounds normal.   GENITALIA: Normal male external genitalia. Cuco stage I,  both testes descended, no hernia or hydrocele.    EXTREMITIES: Full range of motion, no deformities  NEUROLOGIC: No focal findings. " Cranial nerves grossly intact: DTR's normal. Normal gait, strength and tone    ASSESSMENT/PLAN:   1. Encounter for routine child health examination w/o abnormal findings  Doing well.   - PURE TONE HEARING TEST, AIR  - SCREENING, VISUAL ACUITY, QUANTITATIVE, BILAT  - BEHAVIORAL / EMOTIONAL ASSESSMENT [[id number]]    2. Acute suppurative otitis media of right ear without spontaneous rupture of tympanic membrane, recurrence not specified  Discussed treatment or not treating and mom opts to treat.  Recheck if not 100% better by time he's done with meds.    - cefdinir (OMNICEF) 250 MG/5ML suspension; Take 6 mLs (300 mg) by mouth daily for 7 days  Dispense: 42 mL; Refill: 0    Anticipatory Guidance  Reviewed Anticipatory Guidance in patient instructions    Preventive Care Plan  Immunizations    Reviewed, up to date  Referrals/Ongoing Specialty care: No   [facility]  BMI at 74 %ile based on CDC (Boys, 2-20 Years) BMI-for-age based on body measurements available as of 4/4/2019.  No weight concerns.  Dyslipidemia risk:    None    FOLLOW-UP:    in 1 year for a Preventive Care visit    Resources  Goal Tracker: Be More Active  Goal Tracker: Less Screen Time  Goal Tracker: Drink More Water  Goal Tracker: Eat More Fruits and Veggies  [place] Child and Teen Checkups (C&TC) Schedule of Age-Related Screening Standards    Geoff Ramirez MD  [facility]

## 2019-05-21 NOTE — ED TRIAGE NOTES
"Pt ambulatory to triage , c/o blurred vision to left eye, states \" he woke up with it yesterday morning\" , pt also c/o \"seeing floaters\" , pt denies headache, denies any other symptoms, on medication for high blood pressure   "

## 2019-05-21 NOTE — PATIENT INSTRUCTIONS
-Keep your wound dressing clean, dry, and intact.    -Change your dressing daily and if it becomes soiled, soaked, or falls off.    -Should you experience any significant changes in your wound(s), such as infection (redness, swelling, localized heat, increased pain, fever > 101 F, chills) or have any questions regarding your home care instructions, please contact the wound center at [phone]. If after hours, contact your primary care physician or go to the hospital emergency room.

## 2019-05-21 NOTE — ED PROVIDER NOTES
ED Provider Note    HPI: Patient is a 48-year-old male who presented to the emergency department May 21, 2019 at 1:42 PM with a chief complaint of left eye blurred vision.    Patient was previously receiving ocular injections for diabetic retinopathy but apparently for insurance reasons he is no longer getting this.  Yesterday he woke up with blurred vision and seeing floaters.  He denied headache nausea vomiting.  He is on medication for blood pressure.  He does not have complete loss of vision in the eye.  He does have a little bit of peripheral sparing in the left eye.  He reports his right eye is normal in all respects.  No chest pain no shortness of breath no trauma.  No other somatic complaint    Review of Systems: Positive for blurred vision left eye negative for nausea vomiting pain shortness of breath trauma.    Past medical/surgical history: Diabetes diabetic retinopathy (presumed) hypertension reflux    Medications: Prilosec Trulicity Amaryl insulin lisinopril    Allergies: None    Social History: Patient does not smoke no alcohol use      Physical exam: Constitutional: Pleasant male awake alert  Vital signs: Temperature 90.3 blood pressure 166/100 pulse 91 respirations 16 pulse oximetry 97%  EYES: PERRL, EOMI, Conjunctivae and sclera normal, eyelids normal bilaterally.  Visual acuity is 2200 left 20/40 right 20/25 both  Neck: Trachea midline. No cervical masses seen or palpated. Normal range of motion, supple. No meningeal signs elicited.   Musculoskeletal:  no  pain with palpitation or movement of muscle, bone or joint , no obvious musculoskeletal deformities identified.  Neurologic: alert and awake answers questions appropriately. Moves all four extremities independently, no gross focal abnormalities identified. Normal strength and motor.  Skin: no rash or lesion seen, no palpable dermatologic lesions identified.  Psychiatric: not anxious, delusional, or hallucinating.    Medical decision making:  Patient noted to be somewhat hypertensive on arrival.  He will follow with his primary care provider for recheck as he has a previous history of this problem     Dr. Dumont was consulted for ophthalmology.  He would prefer to see the patient in tomorrow as he has better equipment to evaluate the patient visual complaints and we have here.  This certainly seems reasonable.  The patient denies any trauma.  It is doubtful this represents something like glaucoma given the absence of pain.  Vitreous detachment would be a possibility.  Retinal artery of retinal vein occlusion do not seem to be consistent with the patient's presenting complaints and in any case her symptoms been present for 36 hours.    Patient given referral information for Dr. Dumont's office.  He will call tomorrow morning to arrange evaluation.  He is given usual discharge instructions for visual disturbance.  He is carefully counseled return to ED for loss of vision visual disturbance in right eye pain or any other problems    Impression visual disturbance/blurring left eye

## 2019-05-22 NOTE — ED NOTES
Pt discharge ordered by provider. Pt discharge instructions reviewed with patient by this RN. Pt verbalized understanding of discharge information. Pt discharged alert, oriented, and ambulatory by this RN.

## 2019-09-20 ENCOUNTER — APPOINTMENT (OUTPATIENT)
Dept: RADIOLOGY | Facility: MEDICAL CENTER | Age: 49
DRG: 247 | End: 2019-09-20
Attending: EMERGENCY MEDICINE
Payer: COMMERCIAL

## 2019-09-20 ENCOUNTER — HOSPITAL ENCOUNTER (INPATIENT)
Facility: MEDICAL CENTER | Age: 49
LOS: 4 days | DRG: 247 | End: 2019-09-27
Attending: EMERGENCY MEDICINE | Admitting: HOSPITALIST
Payer: COMMERCIAL

## 2019-09-20 DIAGNOSIS — R07.9 ACUTE CHEST PAIN: ICD-10-CM

## 2019-09-20 DIAGNOSIS — I10 ESSENTIAL HYPERTENSION: ICD-10-CM

## 2019-09-20 DIAGNOSIS — I21.4 NSTEMI (NON-ST ELEVATED MYOCARDIAL INFARCTION) (HCC): ICD-10-CM

## 2019-09-20 DIAGNOSIS — N17.9 AKI (ACUTE KIDNEY INJURY) (HCC): ICD-10-CM

## 2019-09-20 DIAGNOSIS — E11.8 TYPE 2 DIABETES MELLITUS WITH COMPLICATION, WITH LONG-TERM CURRENT USE OF INSULIN (HCC): ICD-10-CM

## 2019-09-20 DIAGNOSIS — K59.1 FUNCTIONAL DIARRHEA: ICD-10-CM

## 2019-09-20 DIAGNOSIS — R53.82 CHRONIC FATIGUE: ICD-10-CM

## 2019-09-20 DIAGNOSIS — D64.9 NORMOCYTIC ANEMIA: ICD-10-CM

## 2019-09-20 DIAGNOSIS — Z79.4 TYPE 2 DIABETES MELLITUS WITH COMPLICATION, WITH LONG-TERM CURRENT USE OF INSULIN (HCC): ICD-10-CM

## 2019-09-20 DIAGNOSIS — R79.89 ELEVATED TROPONIN: ICD-10-CM

## 2019-09-20 DIAGNOSIS — E11.65 TYPE 2 DIABETES MELLITUS WITH HYPERGLYCEMIA, WITHOUT LONG-TERM CURRENT USE OF INSULIN (HCC): ICD-10-CM

## 2019-09-20 LAB
ALBUMIN SERPL BCP-MCNC: 4.5 G/DL (ref 3.2–4.9)
ALBUMIN/GLOB SERPL: 1.7 G/DL
ALP SERPL-CCNC: 47 U/L (ref 30–99)
ALT SERPL-CCNC: 25 U/L (ref 2–50)
ANION GAP SERPL CALC-SCNC: 8 MMOL/L (ref 0–11.9)
AST SERPL-CCNC: 27 U/L (ref 12–45)
BASOPHILS # BLD AUTO: 0.6 % (ref 0–1.8)
BASOPHILS # BLD: 0.06 K/UL (ref 0–0.12)
BILIRUB SERPL-MCNC: 0.8 MG/DL (ref 0.1–1.5)
BUN SERPL-MCNC: 24 MG/DL (ref 8–22)
CALCIUM SERPL-MCNC: 9.2 MG/DL (ref 8.5–10.5)
CHLORIDE SERPL-SCNC: 105 MMOL/L (ref 96–112)
CO2 SERPL-SCNC: 28 MMOL/L (ref 20–33)
CREAT SERPL-MCNC: 1.41 MG/DL (ref 0.5–1.4)
EKG IMPRESSION: NORMAL
EOSINOPHIL # BLD AUTO: 0.26 K/UL (ref 0–0.51)
EOSINOPHIL NFR BLD: 2.5 % (ref 0–6.9)
ERYTHROCYTE [DISTWIDTH] IN BLOOD BY AUTOMATED COUNT: 42 FL (ref 35.9–50)
GLOBULIN SER CALC-MCNC: 2.7 G/DL (ref 1.9–3.5)
GLUCOSE SERPL-MCNC: 86 MG/DL (ref 65–99)
HCT VFR BLD AUTO: 40.9 % (ref 42–52)
HGB BLD-MCNC: 14 G/DL (ref 14–18)
IMM GRANULOCYTES # BLD AUTO: 0.02 K/UL (ref 0–0.11)
IMM GRANULOCYTES NFR BLD AUTO: 0.2 % (ref 0–0.9)
LYMPHOCYTES # BLD AUTO: 2.75 K/UL (ref 1–4.8)
LYMPHOCYTES NFR BLD: 26.4 % (ref 22–41)
MCH RBC QN AUTO: 30.2 PG (ref 27–33)
MCHC RBC AUTO-ENTMCNC: 34.2 G/DL (ref 33.7–35.3)
MCV RBC AUTO: 88.1 FL (ref 81.4–97.8)
MONOCYTES # BLD AUTO: 0.87 K/UL (ref 0–0.85)
MONOCYTES NFR BLD AUTO: 8.3 % (ref 0–13.4)
NEUTROPHILS # BLD AUTO: 6.47 K/UL (ref 1.82–7.42)
NEUTROPHILS NFR BLD: 62 % (ref 44–72)
NRBC # BLD AUTO: 0 K/UL
NRBC BLD-RTO: 0 /100 WBC
PLATELET # BLD AUTO: 266 K/UL (ref 164–446)
PMV BLD AUTO: 10.2 FL (ref 9–12.9)
POTASSIUM SERPL-SCNC: 4.2 MMOL/L (ref 3.6–5.5)
PROT SERPL-MCNC: 7.2 G/DL (ref 6–8.2)
RBC # BLD AUTO: 4.64 M/UL (ref 4.7–6.1)
SODIUM SERPL-SCNC: 141 MMOL/L (ref 135–145)
TROPONIN T SERPL-MCNC: 34 NG/L (ref 6–19)
TROPONIN T SERPL-MCNC: 35 NG/L (ref 6–19)
WBC # BLD AUTO: 10.4 K/UL (ref 4.8–10.8)

## 2019-09-20 PROCEDURE — A9270 NON-COVERED ITEM OR SERVICE: HCPCS | Performed by: EMERGENCY MEDICINE

## 2019-09-20 PROCEDURE — 94760 N-INVAS EAR/PLS OXIMETRY 1: CPT

## 2019-09-20 PROCEDURE — 80053 COMPREHEN METABOLIC PANEL: CPT

## 2019-09-20 PROCEDURE — 700102 HCHG RX REV CODE 250 W/ 637 OVERRIDE(OP): Performed by: EMERGENCY MEDICINE

## 2019-09-20 PROCEDURE — 71045 X-RAY EXAM CHEST 1 VIEW: CPT

## 2019-09-20 PROCEDURE — 84484 ASSAY OF TROPONIN QUANT: CPT

## 2019-09-20 PROCEDURE — 93005 ELECTROCARDIOGRAM TRACING: CPT

## 2019-09-20 PROCEDURE — 85025 COMPLETE CBC W/AUTO DIFF WBC: CPT

## 2019-09-20 PROCEDURE — 93005 ELECTROCARDIOGRAM TRACING: CPT | Performed by: EMERGENCY MEDICINE

## 2019-09-20 PROCEDURE — 36415 COLL VENOUS BLD VENIPUNCTURE: CPT

## 2019-09-20 PROCEDURE — 99285 EMERGENCY DEPT VISIT HI MDM: CPT

## 2019-09-20 RX ORDER — ASPIRIN 81 MG/1
324 TABLET, CHEWABLE ORAL ONCE
Status: COMPLETED | OUTPATIENT
Start: 2019-09-21 | End: 2019-09-20

## 2019-09-20 RX ADMIN — ASPIRIN 81 MG 324 MG: 81 TABLET ORAL at 23:51

## 2019-09-21 ENCOUNTER — APPOINTMENT (OUTPATIENT)
Dept: RADIOLOGY | Facility: MEDICAL CENTER | Age: 49
DRG: 247 | End: 2019-09-21
Attending: HOSPITALIST
Payer: COMMERCIAL

## 2019-09-21 PROBLEM — N28.9 RENAL INSUFFICIENCY: Status: ACTIVE | Noted: 2019-09-21

## 2019-09-21 PROBLEM — I10 ESSENTIAL HYPERTENSION: Status: ACTIVE | Noted: 2019-09-21

## 2019-09-21 PROBLEM — Z79.4 TYPE 2 DIABETES MELLITUS WITH COMPLICATION, WITH LONG-TERM CURRENT USE OF INSULIN (HCC): Status: ACTIVE | Noted: 2019-09-21

## 2019-09-21 PROBLEM — R07.9 CHEST PAIN: Status: ACTIVE | Noted: 2019-09-21

## 2019-09-21 PROBLEM — E11.8 TYPE 2 DIABETES MELLITUS WITH COMPLICATION, WITH LONG-TERM CURRENT USE OF INSULIN (HCC): Status: ACTIVE | Noted: 2019-09-21

## 2019-09-21 PROBLEM — R53.82 CHRONIC FATIGUE: Status: ACTIVE | Noted: 2019-09-21

## 2019-09-21 LAB
D DIMER PPP IA.FEU-MCNC: 0.57 UG/ML (FEU) (ref 0–0.5)
EKG IMPRESSION: NORMAL
GLUCOSE BLD-MCNC: 117 MG/DL (ref 65–99)
GLUCOSE BLD-MCNC: 78 MG/DL (ref 65–99)
GLUCOSE BLD-MCNC: 81 MG/DL (ref 65–99)
GLUCOSE BLD-MCNC: 87 MG/DL (ref 65–99)
TROPONIN T SERPL-MCNC: 33 NG/L (ref 6–19)

## 2019-09-21 PROCEDURE — 700102 HCHG RX REV CODE 250 W/ 637 OVERRIDE(OP): Performed by: HOSPITALIST

## 2019-09-21 PROCEDURE — 700111 HCHG RX REV CODE 636 W/ 250 OVERRIDE (IP)

## 2019-09-21 PROCEDURE — 36415 COLL VENOUS BLD VENIPUNCTURE: CPT

## 2019-09-21 PROCEDURE — G0378 HOSPITAL OBSERVATION PER HR: HCPCS

## 2019-09-21 PROCEDURE — 93005 ELECTROCARDIOGRAM TRACING: CPT | Performed by: HOSPITALIST

## 2019-09-21 PROCEDURE — 99236 HOSP IP/OBS SAME DATE HI 85: CPT | Performed by: HOSPITALIST

## 2019-09-21 PROCEDURE — 93017 CV STRESS TEST TRACING ONLY: CPT

## 2019-09-21 PROCEDURE — 84484 ASSAY OF TROPONIN QUANT: CPT

## 2019-09-21 PROCEDURE — 82962 GLUCOSE BLOOD TEST: CPT | Mod: 91

## 2019-09-21 PROCEDURE — A9270 NON-COVERED ITEM OR SERVICE: HCPCS | Performed by: HOSPITALIST

## 2019-09-21 PROCEDURE — 85379 FIBRIN DEGRADATION QUANT: CPT

## 2019-09-21 PROCEDURE — 93010 ELECTROCARDIOGRAM REPORT: CPT | Performed by: INTERNAL MEDICINE

## 2019-09-21 PROCEDURE — 700105 HCHG RX REV CODE 258: Performed by: HOSPITALIST

## 2019-09-21 PROCEDURE — 99205 OFFICE O/P NEW HI 60 MIN: CPT | Performed by: INTERNAL MEDICINE

## 2019-09-21 RX ORDER — ELUXADOLINE 75 MG/1
1 TABLET, FILM COATED ORAL 2 TIMES DAILY
COMMUNITY
End: 2020-07-14

## 2019-09-21 RX ORDER — CLONIDINE HYDROCHLORIDE 0.1 MG/1
0.1 TABLET ORAL EVERY 6 HOURS PRN
Status: DISCONTINUED | OUTPATIENT
Start: 2019-09-21 | End: 2019-09-27 | Stop reason: HOSPADM

## 2019-09-21 RX ORDER — AMINOPHYLLINE 25 MG/ML
100 INJECTION, SOLUTION INTRAVENOUS
Status: DISCONTINUED | OUTPATIENT
Start: 2019-09-21 | End: 2019-09-27 | Stop reason: HOSPADM

## 2019-09-21 RX ORDER — ASPIRIN 81 MG/1
81 TABLET, CHEWABLE ORAL DAILY
Qty: 100 TAB | Refills: 3 | Status: SHIPPED | OUTPATIENT
Start: 2019-09-21 | End: 2019-10-10

## 2019-09-21 RX ORDER — INSULIN GLARGINE 100 [IU]/ML
10 INJECTION, SOLUTION SUBCUTANEOUS NIGHTLY
Status: DISCONTINUED | OUTPATIENT
Start: 2019-09-21 | End: 2019-09-21

## 2019-09-21 RX ORDER — ONDANSETRON 2 MG/ML
4 INJECTION INTRAMUSCULAR; INTRAVENOUS EVERY 4 HOURS PRN
Status: DISCONTINUED | OUTPATIENT
Start: 2019-09-21 | End: 2019-09-27 | Stop reason: HOSPADM

## 2019-09-21 RX ORDER — OMEPRAZOLE 20 MG/1
20 CAPSULE, DELAYED RELEASE ORAL DAILY
Status: DISCONTINUED | OUTPATIENT
Start: 2019-09-21 | End: 2019-09-27 | Stop reason: HOSPADM

## 2019-09-21 RX ORDER — OXYCODONE HYDROCHLORIDE 5 MG/1
5 TABLET ORAL
Status: DISCONTINUED | OUTPATIENT
Start: 2019-09-21 | End: 2019-09-27 | Stop reason: HOSPADM

## 2019-09-21 RX ORDER — PROMETHAZINE HYDROCHLORIDE 25 MG/1
12.5-25 TABLET ORAL EVERY 4 HOURS PRN
Status: DISCONTINUED | OUTPATIENT
Start: 2019-09-21 | End: 2019-09-27 | Stop reason: HOSPADM

## 2019-09-21 RX ORDER — POLYETHYLENE GLYCOL 3350 17 G/17G
1 POWDER, FOR SOLUTION ORAL
Status: DISCONTINUED | OUTPATIENT
Start: 2019-09-21 | End: 2019-09-27 | Stop reason: HOSPADM

## 2019-09-21 RX ORDER — REGADENOSON 0.08 MG/ML
INJECTION, SOLUTION INTRAVENOUS
Status: COMPLETED
Start: 2019-09-21 | End: 2019-09-21

## 2019-09-21 RX ORDER — LISINOPRIL 20 MG/1
40 TABLET ORAL DAILY
Status: DISCONTINUED | OUTPATIENT
Start: 2019-09-21 | End: 2019-09-27 | Stop reason: HOSPADM

## 2019-09-21 RX ORDER — ASPIRIN 81 MG/1
324 TABLET, CHEWABLE ORAL DAILY
Status: DISCONTINUED | OUTPATIENT
Start: 2019-09-21 | End: 2019-09-22

## 2019-09-21 RX ORDER — REGADENOSON 0.08 MG/ML
0.4 INJECTION, SOLUTION INTRAVENOUS
Status: COMPLETED | OUTPATIENT
Start: 2019-09-21 | End: 2019-09-21

## 2019-09-21 RX ORDER — OXYCODONE HYDROCHLORIDE 5 MG/1
2.5 TABLET ORAL
Status: DISCONTINUED | OUTPATIENT
Start: 2019-09-21 | End: 2019-09-27 | Stop reason: HOSPADM

## 2019-09-21 RX ORDER — ASPIRIN 300 MG/1
300 SUPPOSITORY RECTAL DAILY
Status: DISCONTINUED | OUTPATIENT
Start: 2019-09-21 | End: 2019-09-22

## 2019-09-21 RX ORDER — PROMETHAZINE HYDROCHLORIDE 25 MG/1
12.5-25 SUPPOSITORY RECTAL EVERY 4 HOURS PRN
Status: DISCONTINUED | OUTPATIENT
Start: 2019-09-21 | End: 2019-09-27 | Stop reason: HOSPADM

## 2019-09-21 RX ORDER — ONDANSETRON 4 MG/1
4 TABLET, ORALLY DISINTEGRATING ORAL EVERY 4 HOURS PRN
Status: DISCONTINUED | OUTPATIENT
Start: 2019-09-21 | End: 2019-09-27 | Stop reason: HOSPADM

## 2019-09-21 RX ORDER — SODIUM CHLORIDE 9 MG/ML
INJECTION, SOLUTION INTRAVENOUS CONTINUOUS
Status: DISCONTINUED | OUTPATIENT
Start: 2019-09-21 | End: 2019-09-21

## 2019-09-21 RX ORDER — MORPHINE SULFATE 4 MG/ML
2 INJECTION, SOLUTION INTRAMUSCULAR; INTRAVENOUS
Status: DISCONTINUED | OUTPATIENT
Start: 2019-09-21 | End: 2019-09-27 | Stop reason: HOSPADM

## 2019-09-21 RX ORDER — PROCHLORPERAZINE EDISYLATE 5 MG/ML
5-10 INJECTION INTRAMUSCULAR; INTRAVENOUS EVERY 4 HOURS PRN
Status: DISCONTINUED | OUTPATIENT
Start: 2019-09-21 | End: 2019-09-27 | Stop reason: HOSPADM

## 2019-09-21 RX ORDER — ASPIRIN 325 MG
325 TABLET ORAL DAILY
Status: DISCONTINUED | OUTPATIENT
Start: 2019-09-21 | End: 2019-09-22

## 2019-09-21 RX ORDER — BISACODYL 10 MG
10 SUPPOSITORY, RECTAL RECTAL
Status: DISCONTINUED | OUTPATIENT
Start: 2019-09-21 | End: 2019-09-27 | Stop reason: HOSPADM

## 2019-09-21 RX ORDER — SODIUM CHLORIDE 9 MG/ML
INJECTION, SOLUTION INTRAVENOUS CONTINUOUS
Status: DISCONTINUED | OUTPATIENT
Start: 2019-09-21 | End: 2019-09-23

## 2019-09-21 RX ORDER — ACETAMINOPHEN 325 MG/1
650 TABLET ORAL EVERY 6 HOURS PRN
Status: DISCONTINUED | OUTPATIENT
Start: 2019-09-21 | End: 2019-09-27 | Stop reason: HOSPADM

## 2019-09-21 RX ORDER — AMOXICILLIN 250 MG
2 CAPSULE ORAL 2 TIMES DAILY
Status: DISCONTINUED | OUTPATIENT
Start: 2019-09-21 | End: 2019-09-27 | Stop reason: HOSPADM

## 2019-09-21 RX ADMIN — REGADENOSON 0.4 MG: 0.08 INJECTION, SOLUTION INTRAVENOUS at 14:07

## 2019-09-21 RX ADMIN — OMEPRAZOLE 20 MG: 20 CAPSULE, DELAYED RELEASE ORAL at 06:24

## 2019-09-21 RX ADMIN — LISINOPRIL 40 MG: 20 TABLET ORAL at 06:23

## 2019-09-21 RX ADMIN — METOPROLOL TARTRATE 25 MG: 25 TABLET, FILM COATED ORAL at 17:51

## 2019-09-21 RX ADMIN — ASPIRIN 81 MG 324 MG: 81 TABLET ORAL at 06:22

## 2019-09-21 RX ADMIN — SODIUM CHLORIDE: 9 INJECTION, SOLUTION INTRAVENOUS at 17:51

## 2019-09-21 ASSESSMENT — ENCOUNTER SYMPTOMS
EYE PAIN: 0
VOMITING: 0
BRUISES/BLEEDS EASILY: 0
DIARRHEA: 0
GASTROINTESTINAL NEGATIVE: 1
MUSCULOSKELETAL NEGATIVE: 1
WEAKNESS: 0
HEADACHES: 0
SPUTUM PRODUCTION: 0
PSYCHIATRIC NEGATIVE: 1
EYE DISCHARGE: 0
SPEECH CHANGE: 0
HEMOPTYSIS: 0
BACK PAIN: 0
PALPITATIONS: 0
SORE THROAT: 0
MYALGIAS: 0
DIZZINESS: 0
FEVER: 0
DEPRESSION: 0
SENSORY CHANGE: 0
CONSTITUTIONAL NEGATIVE: 1
NEUROLOGICAL NEGATIVE: 1
CLAUDICATION: 0
CHILLS: 0
WHEEZING: 0
COUGH: 0
NAUSEA: 0
FOCAL WEAKNESS: 0
DIAPHORESIS: 0
CONSTIPATION: 0
NECK PAIN: 0
LOSS OF CONSCIOUSNESS: 0
SHORTNESS OF BREATH: 1
EYES NEGATIVE: 1
ABDOMINAL PAIN: 0

## 2019-09-21 ASSESSMENT — COGNITIVE AND FUNCTIONAL STATUS - GENERAL
MOBILITY SCORE: 24
SUGGESTED CMS G CODE MODIFIER MOBILITY: CH
DAILY ACTIVITIY SCORE: 24
SUGGESTED CMS G CODE MODIFIER DAILY ACTIVITY: CH

## 2019-09-21 ASSESSMENT — PATIENT HEALTH QUESTIONNAIRE - PHQ9
2. FEELING DOWN, DEPRESSED, IRRITABLE, OR HOPELESS: NOT AT ALL
1. LITTLE INTEREST OR PLEASURE IN DOING THINGS: NOT AT ALL
SUM OF ALL RESPONSES TO PHQ9 QUESTIONS 1 AND 2: 0

## 2019-09-21 ASSESSMENT — LIFESTYLE VARIABLES
TOTAL SCORE: 0
TOTAL SCORE: 0
HOW MANY TIMES IN THE PAST YEAR HAVE YOU HAD 5 OR MORE DRINKS IN A DAY: 0
ALCOHOL_USE: NO
EVER FELT BAD OR GUILTY ABOUT YOUR DRINKING: NO
HAVE YOU EVER FELT YOU SHOULD CUT DOWN ON YOUR DRINKING: NO
ON A TYPICAL DAY WHEN YOU DRINK ALCOHOL HOW MANY DRINKS DO YOU HAVE: 0
DOES PATIENT WANT TO STOP DRINKING: NO
CONSUMPTION TOTAL: NEGATIVE
SUBSTANCE_ABUSE: 0
EVER HAD A DRINK FIRST THING IN THE MORNING TO STEADY YOUR NERVES TO GET RID OF A HANGOVER: NO
EVER_SMOKED: NEVER
TOTAL SCORE: 0
AVERAGE NUMBER OF DAYS PER WEEK YOU HAVE A DRINK CONTAINING ALCOHOL: 0
HAVE PEOPLE ANNOYED YOU BY CRITICIZING YOUR DRINKING: NO

## 2019-09-21 NOTE — ASSESSMENT & PLAN NOTE
Status post catheter and stent placement 09/22   Cardiology following  No more chest pain   Proper medical management including beta-blocker, statins, aspirin, Brilinta  Echocardiogram  9/24 after PCI placement ejection fraction 65%.  He will need to continue cardiac rehab.  Referral in place

## 2019-09-21 NOTE — H&P
Hospital Medicine History & Physical Note    Date of Service  9/21/2019    Primary Care Physician  Nolan Buchanan D.O.    Consultants  None    Code Status  Full code    Chief Complaint  Chest pain    History of Presenting Illness  49 y.o. male with history of diabetes mellitus on insulin therapy, with associated neuropathy status post amputations, essential hypertension which is controlled, and dyslipidemia, was in his usual state of health until the evening prior to admission.  While at rest, he had a sudden onset of chest pain which he described as sharp in nature in the center of his chest without radiation.  He denies any exacerbating or alleviating factors.  Due to the severity of the pain, and associated shortness of breath, he presented to the emergency department for further evaluation.    Review of Systems  Review of Systems   Constitutional: Negative.    HENT: Negative.    Eyes: Negative.    Respiratory: Positive for shortness of breath.    Cardiovascular: Positive for chest pain.   Gastrointestinal: Negative.    Genitourinary: Negative.    Musculoskeletal: Negative.    Skin: Negative.    Neurological: Negative.    Endo/Heme/Allergies: Negative.    Psychiatric/Behavioral: Negative.        Past Medical History   has a past medical history of Diabetes (HCC) and Hypertension.    Surgical History   has a past surgical history that includes other abdominal surgery; other orthopedic surgery; other; toe amputation (Right, 7/2/2018); achilles tendon repair (Right, 7/2/2018); and irrigation & debridement ortho (Right, 7/2/2018).     Family History  family history includes Diabetes in his father; Heart Disease in his father; No Known Problems in his mother.     Social History   reports that he has never smoked. He has never used smokeless tobacco. He reports that he does not drink alcohol or use drugs.    Allergies  No Known Allergies    Medications  Prior to Admission Medications   Prescriptions Last Dose  Informant Patient Reported? Taking?   TRULICITY 1.5 MG/0.5ML Solution Pen-injector 2019 at Unknown time  Yes No   Si.5 mg by Injection route every 7 days.   glimepiride (AMARYL) 4 MG Tab 2019 at Unknown time  Yes No   Sig: Take 4 mg by mouth every day.   insulin glargine (LANTUS) 100 UNIT/ML Solution 2019 at Unknown time  Yes No   Sig: Inject 10 Units as instructed every evening.   lisinopril (PRINIVIL, ZESTRIL) 40 MG tablet 2019 at Unknown time  Yes No   Sig: Take 40 mg by mouth every day.   omeprazole (PRILOSEC) 20 MG delayed-release capsule  Patient Yes No   Sig: Take 20 mg by mouth every day.   sulfamethoxazole-trimethoprim (BACTRIM DS) 800-160 MG tablet   Yes No   Sig: Take 1 Tab by mouth 2 times a day.      Facility-Administered Medications: None       Physical Exam  Temp:  [36.2 °C (97.2 °F)] 36.2 °C (97.2 °F)  Pulse:  [] 90  Resp:  [14-16] 14  BP: (115-165)/() 146/90  SpO2:  [97 %-98 %] 97 %    Physical Exam   Constitutional: He is oriented to person, place, and time. He appears well-developed and well-nourished. No distress.   HENT:   Head: Normocephalic and atraumatic.   Eyes: Pupils are equal, round, and reactive to light. Conjunctivae are normal.   Neck: Normal range of motion. Neck supple. No tracheal deviation present. No thyromegaly present.   Cardiovascular: Normal rate, regular rhythm and normal heart sounds. Exam reveals no gallop and no friction rub.   No murmur heard.  Pulmonary/Chest: Effort normal and breath sounds normal. No respiratory distress. He has no wheezes.   Abdominal: Soft. Bowel sounds are normal. He exhibits no distension and no mass. There is no tenderness. There is no rebound and no guarding.   Musculoskeletal: Normal range of motion. He exhibits no edema.   Lymphadenopathy:     He has no cervical adenopathy.   Neurological: He is alert and oriented to person, place, and time. No cranial nerve deficit.   Skin: Skin is warm and dry. He is not  diaphoretic.   Psychiatric: He has a normal mood and affect.   Nursing note and vitals reviewed.      Laboratory:  Recent Labs     09/20/19 2049   WBC 10.4   RBC 4.64*   HEMOGLOBIN 14.0   HEMATOCRIT 40.9*   MCV 88.1   MCH 30.2   MCHC 34.2   RDW 42.0   PLATELETCT 266   MPV 10.2     Recent Labs     09/20/19 2049   SODIUM 141   POTASSIUM 4.2   CHLORIDE 105   CO2 28   GLUCOSE 86   BUN 24*   CREATININE 1.41*   CALCIUM 9.2     Recent Labs     09/20/19 2049   ALTSGPT 25   ASTSGOT 27   ALKPHOSPHAT 47   TBILIRUBIN 0.8   GLUCOSE 86         No results for input(s): NTPROBNP in the last 72 hours.      Recent Labs     09/20/19 2049 09/20/19  2253   TROPONINT 35* 34*       Urinalysis:    No results found     Imaging:  DX-CHEST-PORTABLE (1 VIEW)   Final Result         1.  No acute cardiopulmonary disease.      NM-CARDIAC STRESS TEST    (Results Pending)         Assessment/Plan:  I anticipate this patient is appropriate for observation status at this time.    * Chest pain  Assessment & Plan  Atypical, however cannot rule out the presence of coronary artery disease especially given underlying diabetes mellitus.    Essential hypertension  Assessment & Plan  Controlled with current medication regimen.  Monitor.    Type 2 diabetes mellitus with complication, with long-term current use of insulin (HCC)  Assessment & Plan  Continue with basal insulin therapy, correction dosing as needed.  Currently with hypoglycemia in the setting of not eating anything for the past 9 hours.    S/P transmetatarsal amputation of foot, right (HCC)- (present on admission)  Assessment & Plan  Stable.        VTE prophylaxis: SCD, lovenox

## 2019-09-21 NOTE — PROGRESS NOTES
2 RN skin check completed with Nakia HENAO.   Devices in place N/A.  Skin assessed under devices N/A.  Confirmed pressure ulcers found on N/A.  Abrasions on R/L shins and knees. Cleansed.   The following interventions in place q2hr self turns, and pillows for positioning.

## 2019-09-21 NOTE — ASSESSMENT & PLAN NOTE
09/26-orthostatics again positive today after 1 L of bolus .  Hold amlodipine.  Continue lisinopril and carvedilol.  Reevaluate tomorrow  orthostatic positive 09/25 after taking HCTZ  Vasovagal response 09/24.    Currently on lisinopril 40 mg daily, carvedilol 25 mg twice daily.  Decrease carvedilol 12.5 mg twice daily 09/25  Added amlodipine 10 mg daily (09/24) decreased to 5 mg 09/25  Add hydrochlorothiazide 09/25 and discontinued after orthostatics positive  Bolus 500 cc  Continue to monitor .  Possible discharge 09/26

## 2019-09-21 NOTE — ASSESSMENT & PLAN NOTE
He is on Trulicity 1.5 mg q. 7 days, glimepiride 4 mg daily, Lantus 10 units at bedtime as outpatient  Last A1c 6.6  Continue insulin sliding scale while in the hospital  Resume home medications on discharge

## 2019-09-21 NOTE — ED NOTES
Med rec complete per pt at bedside   NKDA  No oral ABX within last 14 days   Pt has VIBERZI medication at bedside for pharmacy verification

## 2019-09-21 NOTE — ED NOTES
[facility] med called and they relayed that they were going to do procedure at 1300.  Pt and visitor at bedside updated on plan of care.  Pt remains on cardiac monitor.  Vital signs cycling.  Will continue to monitor.

## 2019-09-21 NOTE — ED NOTES
Report recd and care assumed.  Pt resting in bed attached to cardiac monitor. Will continue to monitor.

## 2019-09-21 NOTE — ED NOTES
Consult pharmacy regarding Lantus and Pt glucose, Pharmacy to consult Dr. Puga. Insulin held at this time.   Pt resting with easy respirations, VSS.

## 2019-09-21 NOTE — ED PROVIDER NOTES
ED Provider Note    Scribed for Pankaj Richardson M.D. by Troy Jennings. 9/20/2019, 11:17 PM.    Primary care provider: Nolan Buchanan D.O.  Means of arrival: Walk-in  History obtained from: Patient  History limited by: None    CHIEF COMPLAINT  Chief Complaint   Patient presents with   • Chest Pain       HPI  Beni Moore is a 49 y.o. male with a history of hypertension and diabetes who presents to the Emergency Department with acute, mild, substernal, sharp chest pain onset 2 hours ago. Patient states that he was sitting at home when he started to feel heart palpitations. Shortly after, he developed the chest pain which lasted for around 10 minutes.  He did not take any medication for his symptoms.  He had associated shortness of breath, lightheadedness, and a nonproductive cough, but no nausea or diaphoresis.  There are no known alleviating or exacerbating factors.  In addition, he notes that while butchering an antelope yesterday he developed shortness of breath and diaphoresis, which resolved after sitting down.  This is unusual for him.  He denies any associated nausea, hemoptysis, cough, cold symptoms, hematochezia, or vomiting. He states that he has had chronic diarrhea for the last 4 years.  No history of DVT/PE, recent long distance travel or surgery, exogenous hormone use.      REVIEW OF SYSTEMS  Pertinent positives include chest pain, shortness of breath, diaphoresis lightheadedness, and non-productive cough. Pertinent negatives include no nausea, hemoptysis, cough, cold symptoms, hematochezia, or vomiting. As above, all other systems reviewed and are negative.   See HPI for further details.     PAST MEDICAL HISTORY   has a past medical history of Diabetes (HCC) and Hypertension.    SURGICAL HISTORY   has a past surgical history that includes other abdominal surgery; other orthopedic surgery; other; toe amputation (Right, 7/2/2018); achilles tendon repair (Right, 7/2/2018); and irrigation &  debridement ortho (Right, 7/2/2018).    SOCIAL HISTORY  Social History     Tobacco Use   • Smoking status: Never Smoker   • Smokeless tobacco: Never Used   Substance Use Topics   • Alcohol use: No   • Drug use: No      Social History     Substance and Sexual Activity   Drug Use No       FAMILY HISTORY  Family History   Problem Relation Age of Onset   • No Known Problems Mother    • Diabetes Father    • Heart Disease Father        CURRENT MEDICATIONS  Current Outpatient Medications:   •  sulfamethoxazole-trimethoprim (BACTRIM DS) 800-160 MG tablet, Take 1 Tab by mouth 2 times a day., Disp: , Rfl:   •  lisinopril (PRINIVIL, ZESTRIL) 40 MG tablet, Take 40 mg by mouth every day., Disp: , Rfl:   •  insulin glargine (LANTUS) 100 UNIT/ML Solution, Inject 10 Units as instructed every evening., Disp: , Rfl:   •  glimepiride (AMARYL) 4 MG Tab, Take 4 mg by mouth every day., Disp: , Rfl: 0  •  TRULICITY 1.5 MG/0.5ML Solution Pen-injector, 1.5 mg by Injection route every 7 days., Disp: , Rfl:   •  omeprazole (PRILOSEC) 20 MG delayed-release capsule, Take 20 mg by mouth every day., Disp: , Rfl:     ALLERGIES  No Known Allergies    PHYSICAL EXAM  VITAL SIGNS: /85   Pulse 98   Temp 36.2 °C (97.2 °F) (Temporal)   Resp 14   Ht 1.829 m (6')   Wt 104.3 kg (230 lb)   SpO2 98%   BMI 31.19 kg/m²   Vitals reviewed.  Constitutional: Alert in no apparent distress.  HENT: No signs of trauma, Bilateral external ears normal, Nose normal. moist mucous membranes.  Eyes: Pupils are equal and reactive, Conjunctiva normal, Non-icteric.   Neck: Normal range of motion, No tenderness, Supple, No stridor.   Lymphatic: No lymphadenopathy noted.   Cardiovascular: Regular rate and Intermittently irregular rhythm, no murmurs.   Thorax & Lungs: Normal breath sounds, No respiratory distress, No wheezing, No chest tenderness.   Abdomen: Bowel sounds normal, Soft, No tenderness, No peritoneal signs, No masses, No pulsatile masses.   Skin: Warm,  Dry, No erythema, No rash.   Back: Normal alignment.    Extremities: No edema, No tenderness, No cyanosis  Musculoskeletal: Moves all extremities without difficulty.  Neurologic: Alert, No focal deficits noted.   Psychiatric: Affect normal, Judgment normal, Mood normal.     DIAGNOSTIC STUDIES / PROCEDURES    LABS  Labs Reviewed   CBC WITH DIFFERENTIAL - Abnormal; Notable for the following components:       Result Value    RBC 4.64 (*)     Hematocrit 40.9 (*)     Monos (Absolute) 0.87 (*)     All other components within normal limits   COMP METABOLIC PANEL - Abnormal; Notable for the following components:    Bun 24 (*)     Creatinine 1.41 (*)     All other components within normal limits   TROPONIN - Abnormal; Notable for the following components:    Troponin T 35 (*)     All other components within normal limits   ESTIMATED GFR - Abnormal; Notable for the following components:    GFR If Non  53 (*)     All other components within normal limits   TROPONIN - Abnormal; Notable for the following components:    Troponin T 34 (*)     All other components within normal limits      All labs reviewed by me.    EKG Interpretation:    Results for orders placed or performed during the hospital encounter of 19   EKG   Result Value Ref Range    Report       [facility] Emergency [facility]    Test Date:  2019  Pt Name:    HELIO BARROW                     Department: ER  MRN:        [id number]                      Room:  Gender:     Male                         Technician: [id number]  :        1970                   Requested By:ER TRIAGE PROTOCOL  Order #:    [id number]                    Reading MD: Pankaj Richardson MD    Measurements  Intervals                                Axis  Rate:       97                           P:          48  CA:         140                          QRS:        70  QRSD:       106                          T:          -1  QT:         352  QTc:         447    Interpretive Statements  SINUS RHYTHM  ATRIAL PREMATURE COMPLEX  BORDERLINE INTRAVENTRICULAR CONDUCTION DELAY  BORDERLINE INFERIOR Q WAVES  BORDERLINE T ABNORMALITIES, INFERIOR LEADS  No previous ECG available for comparison    Electronically Signed On 9- 23:56:00 PDT by Pankaj Richardson MD         RADIOLOGY  DX-CHEST-PORTABLE (1 VIEW)   Final Result         1.  No acute cardiopulmonary disease.        The radiologist's interpretation of all radiological studies have been reviewed by me.    COURSE & MEDICAL DECISION MAKING  Nursing notes, VS, PMSFHx reviewed in chart.  Differential diagnoses include but not limited to: ACS, PE, GERD, heart failure, arrhythmia, valvular abnormality, pericarditis/myocarditis, aortic dissection, cardiac tamponade    11:37 PM Patient seen and examined at bedside. Patient arrives afebrile with normal vital signs. Patient appears well hydrated and non-toxic.  He does have intermittent irregular rhythm which looks like PACs on his EKG.  No murmurs.  No lower extremity edema.  No hemoptysis and no risk factors for PE.  Ordered for DX-chest, troponin, estimated GFR, CBC with diff, CMP, troponin to evaluate. Patient will be treated with  mg for his symptoms.    Patient is a mild AXEL with BUN/creatinine of 1.41.  Otherwise his metabolic panel and CBC are unremarkable.  Initial troponin is 35 which decreased in 2 hours to 34.  Chest x-ray without acute abnormality.  He will benefit from admission and stress test.      11:49 PM patient reevaluated.  No new symptoms. Discussed lab and radiology results with the patient as well as plan for admission. Patient agreeable.     11:51 PM Hospitalist paged.     11:59 PM I discussed the patient's case and the above findings with Dr. Puga (Hospitalist) who agrees to admit the patient into his care.    DISPOSITION:  Patient will be admitted to Dr. Puga in guarded condition.    FINAL IMPRESSION  1. Acute chest pain    2. Elevated troponin          I, Pankaj Richardson M.D. personally performed the services described in this documentation, as scribed by Troy Jennings in my presence, and it is both accurate and complete.    C.    The note accurately reflects work and decisions made by me.  Pankaj Richardson  9/21/2019  6:14 AM

## 2019-09-21 NOTE — ED NOTES
Pt concerned about glucose, has not had evening medications or food. Checked POC glucose. Notified Dr. Puga.  Dr. Puga at bedside.

## 2019-09-21 NOTE — ED NOTES
Pt denies any chest pain at this time. Called for hospital bed for Pt, per [facility] no more beds available.

## 2019-09-21 NOTE — DISCHARGE SUMMARY
Discharge Summary    CHIEF COMPLAINT ON ADMISSION  Chief Complaint   Patient presents with   • Chest Pain       Reason for Admission  Chest Pain     Admission Date  9/20/2019    CODE STATUS  Full Code    HPI & HOSPITAL COURSE  This is a 49 y.o. male with history of diabetes mellitus on insulin therapy, with associated neuropathy status post amputations, essential hypertension which is controlled, and dyslipidemia, was in his usual state of health until the evening prior to admission.  While at rest, he had a sudden onset of chest pain which he described as sharp in nature in the center of his chest without radiation.     Patient had mildly elevated blood pressure throughout his hospitalization 144/93, given his diabetes, metoprolol 25mg twice daily prescribed, continue with ACEI for renal protective properties.  Cr mild increase of 1.4, will need to be monitored by PCP.  NM stress test negative for reversible ischemia, nonreversible uptake seen, artifact or infarct.  D-dimer negative.  He had no further chest pain and was wanting to go home.  CXR negative.  EKG reviewed with SR 97 and artifact seen in inferior leads.  No evidence of ischemia seen on my review.  QTI wnl.    Therefore, he is discharged in good and stable condition to home with close outpatient follow-up.    The patient recovered much more quickly than anticipated on admission.    Discharge Date  9/21/2019    FOLLOW UP ITEMS POST DISCHARGE  Follow up with your PCP in 1-2 weeks for recheck BMP.    DISCHARGE DIAGNOSES  Principal Problem:    Chest pain POA: Yes  Active Problems:    S/P transmetatarsal amputation of foot, right (HCC) POA: Yes    Type 2 diabetes mellitus with complication, with long-term current use of insulin (HCC) POA: Yes    Essential hypertension POA: Yes    Renal insufficiency POA: Yes  Resolved Problems:    * No resolved hospital problems. *      FOLLOW UP  No future appointments.  No follow-up provider specified.    MEDICATIONS ON  DISCHARGE     Medication List      START taking these medications      Instructions   aspirin 81 MG Chew chewable tablet  Commonly known as:  ASA   Take 1 Tab by mouth every day.  Dose:  81 mg     metoprolol 25 MG Tabs  Commonly known as:  LOPRESSOR   Take 1 Tab by mouth 2 times a day.  Dose:  25 mg        CONTINUE taking these medications      Instructions   glimepiride 4 MG Tabs  Commonly known as:  AMARYL   Take 4 mg by mouth every day.  Dose:  4 mg     insulin glargine 100 UNIT/ML Soln  Commonly known as:  LANTUS   Inject 10 Units as instructed every evening.  Dose:  10 Units     lisinopril 40 MG tablet  Commonly known as:  PRINIVIL, ZESTRIL   Take 40 mg by mouth every day.  Dose:  40 mg     omeprazole 20 MG delayed-release capsule  Commonly known as:  PRILOSEC   Take 20 mg by mouth every day.  Dose:  20 mg     TRULICITY 1.5 MG/0.5ML Sopn  Generic drug:  Dulaglutide   1.5 mg by Injection route every 7 days.  Dose:  1.5 mg     VIBERZI 75 MG Tabs  Generic drug:  Eluxadoline   Take 1 Tab by mouth 2 Times a Day.  Dose:  1 Tab            Allergies  No Known Allergies    DIET  Orders Placed This Encounter   Procedures   • Diet Order Diabetic     Standing Status:   Standing     Number of Occurrences:   1     Order Specific Question:   Diet:     Answer:   Diabetic [3]       ACTIVITY  As tolerated.  Weight bearing as tolerated    CONSULTATIONS  none    PROCEDURES      9/20/2019 10:48 PM    HISTORY/REASON FOR EXAM: Chest Pain    TECHNIQUE/EXAM DESCRIPTION:  Single AP view of the chest.    COMPARISON: None    FINDINGS:    Overlying cardiac leads are present. The cardiac silhouette appears within normal limits.    The mediastinal contour appears within normal limits.  The central pulmonary vasculature appears normal.    Bilateral lung volumes are diminished.  Hazy linear density in the bilateral lung bases is observed.    No significant pleural effusions are identified.    The bony structures appear age-appropriate.    Impression         1.  No acute cardiopulmonary disease.                      Myocardial Perfusion   Report   NUCLEAR IMAGING INTERPRETATION   Normal left ventricular size, ejection fraction, and wall motion.   No reversible defects that would indicate ischemia.    Non-reversible defect noted could be due to artifact or infarct.     HELIO BARROW     MRN:    [id number]         Gender:    M     Exam Date: 09/21/2019 13:33  LABORATORY  Lab Results   Component Value Date    SODIUM 141 09/20/2019    POTASSIUM 4.2 09/20/2019    CHLORIDE 105 09/20/2019    CO2 28 09/20/2019    GLUCOSE 86 09/20/2019    BUN 24 (H) 09/20/2019    CREATININE 1.41 (H) 09/20/2019        Lab Results   Component Value Date    WBC 10.4 09/20/2019    HEMOGLOBIN 14.0 09/20/2019    HEMATOCRIT 40.9 (L) 09/20/2019    PLATELETCT 266 09/20/2019        Total time of the discharge process exceeds 40 minutes.

## 2019-09-21 NOTE — ED NOTES
No acute change in condition since last entry.  Vital signs stable.  Will continue to monitor until nuclear med study, further orders or pt is taken to room.  Pt remains on cardiac monitor with vital signs cycling.

## 2019-09-21 NOTE — PROGRESS NOTES
Attending Hospitalist today is Dr Aguila starting at 0700. Please call this Physician for orders, updates and questions.

## 2019-09-21 NOTE — ED TRIAGE NOTES
Pt x2 hours chest pain left side non radiating, denies n/v, sob, speaking in full sentences, ekg done, orders in, will alert staff in conditions changes, has had palpitations in past.

## 2019-09-22 ENCOUNTER — APPOINTMENT (OUTPATIENT)
Dept: CARDIOLOGY | Facility: MEDICAL CENTER | Age: 49
DRG: 247 | End: 2019-09-22
Attending: NURSE PRACTITIONER
Payer: COMMERCIAL

## 2019-09-22 PROBLEM — N17.9 AKI (ACUTE KIDNEY INJURY) (HCC): Status: ACTIVE | Noted: 2019-09-21

## 2019-09-22 LAB
ANION GAP SERPL CALC-SCNC: 8 MMOL/L (ref 0–11.9)
APTT PPP: 29.8 SEC (ref 24.7–36)
BASOPHILS # BLD AUTO: 0.8 % (ref 0–1.8)
BASOPHILS # BLD: 0.06 K/UL (ref 0–0.12)
BUN SERPL-MCNC: 18 MG/DL (ref 8–22)
CALCIUM SERPL-MCNC: 8.7 MG/DL (ref 8.5–10.5)
CHLORIDE SERPL-SCNC: 109 MMOL/L (ref 96–112)
CO2 SERPL-SCNC: 22 MMOL/L (ref 20–33)
CREAT SERPL-MCNC: 1.04 MG/DL (ref 0.5–1.4)
EOSINOPHIL # BLD AUTO: 0.39 K/UL (ref 0–0.51)
EOSINOPHIL NFR BLD: 5.3 % (ref 0–6.9)
ERYTHROCYTE [DISTWIDTH] IN BLOOD BY AUTOMATED COUNT: 40.9 FL (ref 35.9–50)
EST. AVERAGE GLUCOSE BLD GHB EST-MCNC: 143 MG/DL
GLUCOSE BLD-MCNC: 114 MG/DL (ref 65–99)
GLUCOSE BLD-MCNC: 136 MG/DL (ref 65–99)
GLUCOSE BLD-MCNC: 147 MG/DL (ref 65–99)
GLUCOSE BLD-MCNC: 99 MG/DL (ref 65–99)
GLUCOSE SERPL-MCNC: 143 MG/DL (ref 65–99)
HBA1C MFR BLD: 6.6 % (ref 0–5.6)
HCT VFR BLD AUTO: 40.4 % (ref 42–52)
HGB BLD-MCNC: 14.2 G/DL (ref 14–18)
IMM GRANULOCYTES # BLD AUTO: 0.02 K/UL (ref 0–0.11)
IMM GRANULOCYTES NFR BLD AUTO: 0.3 % (ref 0–0.9)
INR PPP: 0.92 (ref 0.87–1.13)
LYMPHOCYTES # BLD AUTO: 2.56 K/UL (ref 1–4.8)
LYMPHOCYTES NFR BLD: 34.6 % (ref 22–41)
MCH RBC QN AUTO: 31 PG (ref 27–33)
MCHC RBC AUTO-ENTMCNC: 35.1 G/DL (ref 33.7–35.3)
MCV RBC AUTO: 88.2 FL (ref 81.4–97.8)
MONOCYTES # BLD AUTO: 0.76 K/UL (ref 0–0.85)
MONOCYTES NFR BLD AUTO: 10.3 % (ref 0–13.4)
NEUTROPHILS # BLD AUTO: 3.6 K/UL (ref 1.82–7.42)
NEUTROPHILS NFR BLD: 48.7 % (ref 44–72)
NRBC # BLD AUTO: 0 K/UL
NRBC BLD-RTO: 0 /100 WBC
PLATELET # BLD AUTO: 263 K/UL (ref 164–446)
PMV BLD AUTO: 10.4 FL (ref 9–12.9)
POTASSIUM SERPL-SCNC: 3.9 MMOL/L (ref 3.6–5.5)
PROTHROMBIN TIME: 12.5 SEC (ref 12–14.6)
RBC # BLD AUTO: 4.58 M/UL (ref 4.7–6.1)
SODIUM SERPL-SCNC: 139 MMOL/L (ref 135–145)
TSH SERPL DL<=0.005 MIU/L-ACNC: 2.43 UIU/ML (ref 0.38–5.33)
WBC # BLD AUTO: 7.4 K/UL (ref 4.8–10.8)

## 2019-09-22 PROCEDURE — 83036 HEMOGLOBIN GLYCOSYLATED A1C: CPT

## 2019-09-22 PROCEDURE — 700102 HCHG RX REV CODE 250 W/ 637 OVERRIDE(OP): Performed by: INTERNAL MEDICINE

## 2019-09-22 PROCEDURE — A9270 NON-COVERED ITEM OR SERVICE: HCPCS | Performed by: INTERNAL MEDICINE

## 2019-09-22 PROCEDURE — A9270 NON-COVERED ITEM OR SERVICE: HCPCS

## 2019-09-22 PROCEDURE — 80048 BASIC METABOLIC PNL TOTAL CA: CPT

## 2019-09-22 PROCEDURE — 99152 MOD SED SAME PHYS/QHP 5/>YRS: CPT | Performed by: INTERNAL MEDICINE

## 2019-09-22 PROCEDURE — 700105 HCHG RX REV CODE 258: Performed by: HOSPITALIST

## 2019-09-22 PROCEDURE — A9270 NON-COVERED ITEM OR SERVICE: HCPCS | Performed by: HOSPITALIST

## 2019-09-22 PROCEDURE — 92928 PRQ TCAT PLMT NTRAC ST 1 LES: CPT | Mod: RC | Performed by: INTERNAL MEDICINE

## 2019-09-22 PROCEDURE — 700102 HCHG RX REV CODE 250 W/ 637 OVERRIDE(OP)

## 2019-09-22 PROCEDURE — 700111 HCHG RX REV CODE 636 W/ 250 OVERRIDE (IP)

## 2019-09-22 PROCEDURE — B2151ZZ FLUOROSCOPY OF LEFT HEART USING LOW OSMOLAR CONTRAST: ICD-10-PCS | Performed by: INTERNAL MEDICINE

## 2019-09-22 PROCEDURE — 4A023N7 MEASUREMENT OF CARDIAC SAMPLING AND PRESSURE, LEFT HEART, PERCUTANEOUS APPROACH: ICD-10-PCS | Performed by: INTERNAL MEDICINE

## 2019-09-22 PROCEDURE — 85025 COMPLETE CBC W/AUTO DIFF WBC: CPT

## 2019-09-22 PROCEDURE — 700102 HCHG RX REV CODE 250 W/ 637 OVERRIDE(OP): Performed by: HOSPITALIST

## 2019-09-22 PROCEDURE — 93458 L HRT ARTERY/VENTRICLE ANGIO: CPT | Mod: 26,59 | Performed by: INTERNAL MEDICINE

## 2019-09-22 PROCEDURE — 700117 HCHG RX CONTRAST REV CODE 255: Performed by: INTERNAL MEDICINE

## 2019-09-22 PROCEDURE — 027034Z DILATION OF CORONARY ARTERY, ONE ARTERY WITH DRUG-ELUTING INTRALUMINAL DEVICE, PERCUTANEOUS APPROACH: ICD-10-PCS | Performed by: INTERNAL MEDICINE

## 2019-09-22 PROCEDURE — 99153 MOD SED SAME PHYS/QHP EA: CPT

## 2019-09-22 PROCEDURE — 99226 PR SUBSEQUENT OBSERVATION CARE,LEVEL III: CPT | Performed by: STUDENT IN AN ORGANIZED HEALTH CARE EDUCATION/TRAINING PROGRAM

## 2019-09-22 PROCEDURE — G0378 HOSPITAL OBSERVATION PER HR: HCPCS

## 2019-09-22 PROCEDURE — B2111ZZ FLUOROSCOPY OF MULTIPLE CORONARY ARTERIES USING LOW OSMOLAR CONTRAST: ICD-10-PCS | Performed by: INTERNAL MEDICINE

## 2019-09-22 PROCEDURE — 82962 GLUCOSE BLOOD TEST: CPT | Mod: 91

## 2019-09-22 PROCEDURE — 84443 ASSAY THYROID STIM HORMONE: CPT

## 2019-09-22 PROCEDURE — 700101 HCHG RX REV CODE 250

## 2019-09-22 PROCEDURE — 85730 THROMBOPLASTIN TIME PARTIAL: CPT

## 2019-09-22 PROCEDURE — 85610 PROTHROMBIN TIME: CPT

## 2019-09-22 PROCEDURE — 36415 COLL VENOUS BLD VENIPUNCTURE: CPT

## 2019-09-22 RX ORDER — VERAPAMIL HYDROCHLORIDE 2.5 MG/ML
INJECTION, SOLUTION INTRAVENOUS
Status: COMPLETED
Start: 2019-09-22 | End: 2019-09-22

## 2019-09-22 RX ORDER — HEPARIN SODIUM 200 [USP'U]/100ML
INJECTION, SOLUTION INTRAVENOUS
Status: COMPLETED
Start: 2019-09-22 | End: 2019-09-22

## 2019-09-22 RX ORDER — MIDAZOLAM HYDROCHLORIDE 1 MG/ML
INJECTION INTRAMUSCULAR; INTRAVENOUS
Status: COMPLETED
Start: 2019-09-22 | End: 2019-09-22

## 2019-09-22 RX ORDER — HEPARIN SODIUM,PORCINE 1000/ML
VIAL (ML) INJECTION
Status: COMPLETED
Start: 2019-09-22 | End: 2019-09-22

## 2019-09-22 RX ORDER — LIDOCAINE HYDROCHLORIDE 20 MG/ML
INJECTION, SOLUTION INFILTRATION; PERINEURAL
Status: COMPLETED
Start: 2019-09-22 | End: 2019-09-22

## 2019-09-22 RX ORDER — ATORVASTATIN CALCIUM 40 MG/1
40 TABLET, FILM COATED ORAL
Status: DISCONTINUED | OUTPATIENT
Start: 2019-09-22 | End: 2019-09-23

## 2019-09-22 RX ORDER — METOPROLOL TARTRATE 1 MG/ML
INJECTION, SOLUTION INTRAVENOUS
Status: COMPLETED
Start: 2019-09-22 | End: 2019-09-22

## 2019-09-22 RX ORDER — BIVALIRUDIN 250 MG/5ML
INJECTION, POWDER, LYOPHILIZED, FOR SOLUTION INTRAVENOUS
Status: COMPLETED
Start: 2019-09-22 | End: 2019-09-22

## 2019-09-22 RX ADMIN — FENTANYL CITRATE 100 MCG: 50 INJECTION INTRAMUSCULAR; INTRAVENOUS at 15:17

## 2019-09-22 RX ADMIN — SODIUM CHLORIDE: 9 INJECTION, SOLUTION INTRAVENOUS at 05:32

## 2019-09-22 RX ADMIN — SODIUM CHLORIDE: 9 INJECTION, SOLUTION INTRAVENOUS at 09:50

## 2019-09-22 RX ADMIN — ATORVASTATIN CALCIUM 40 MG: 40 TABLET, FILM COATED ORAL at 22:28

## 2019-09-22 RX ADMIN — FENTANYL CITRATE 100 MCG: 50 INJECTION INTRAMUSCULAR; INTRAVENOUS at 14:52

## 2019-09-22 RX ADMIN — METOPROLOL TARTRATE 5 MG: 5 INJECTION, SOLUTION INTRAVENOUS at 15:31

## 2019-09-22 RX ADMIN — HEPARIN SODIUM 2000 UNITS: 200 INJECTION, SOLUTION INTRAVENOUS at 14:50

## 2019-09-22 RX ADMIN — BIVALIRUDIN 250 MG: 250 INJECTION, POWDER, LYOPHILIZED, FOR SOLUTION INTRAVENOUS at 15:14

## 2019-09-22 RX ADMIN — OXYCODONE HYDROCHLORIDE 2.5 MG: 5 TABLET ORAL at 17:35

## 2019-09-22 RX ADMIN — CLONIDINE HYDROCHLORIDE 0.1 MG: 0.1 TABLET ORAL at 15:59

## 2019-09-22 RX ADMIN — LISINOPRIL 40 MG: 20 TABLET ORAL at 05:29

## 2019-09-22 RX ADMIN — OXYCODONE HYDROCHLORIDE 5 MG: 5 TABLET ORAL at 17:36

## 2019-09-22 RX ADMIN — OMEPRAZOLE 20 MG: 20 CAPSULE, DELAYED RELEASE ORAL at 05:30

## 2019-09-22 RX ADMIN — LIDOCAINE HYDROCHLORIDE: 20 INJECTION, SOLUTION INFILTRATION; PERINEURAL at 14:50

## 2019-09-22 RX ADMIN — HEPARIN SODIUM: 1000 INJECTION, SOLUTION INTRAVENOUS; SUBCUTANEOUS at 14:50

## 2019-09-22 RX ADMIN — SODIUM CHLORIDE: 9 INJECTION, SOLUTION INTRAVENOUS at 23:10

## 2019-09-22 RX ADMIN — VERAPAMIL HYDROCHLORIDE 2.5 MG: 2.5 INJECTION INTRAVENOUS at 14:51

## 2019-09-22 RX ADMIN — MIDAZOLAM HYDROCHLORIDE 2 MG: 1 INJECTION, SOLUTION INTRAMUSCULAR; INTRAVENOUS at 14:52

## 2019-09-22 RX ADMIN — NITROGLYCERIN 10 ML: 20 INJECTION INTRAVENOUS at 14:50

## 2019-09-22 RX ADMIN — METOPROLOL TARTRATE 25 MG: 25 TABLET, FILM COATED ORAL at 17:36

## 2019-09-22 RX ADMIN — TICAGRELOR 180 MG: 90 TABLET ORAL at 15:28

## 2019-09-22 RX ADMIN — METOPROLOL TARTRATE 25 MG: 25 TABLET, FILM COATED ORAL at 05:28

## 2019-09-22 RX ADMIN — IOHEXOL 110 ML: 350 INJECTION, SOLUTION INTRAVENOUS at 15:28

## 2019-09-22 RX ADMIN — ASPIRIN 325 MG: 325 TABLET, FILM COATED ORAL at 05:29

## 2019-09-22 RX ADMIN — CLONIDINE HYDROCHLORIDE 0.1 MG: 0.1 TABLET ORAL at 22:27

## 2019-09-22 ASSESSMENT — ENCOUNTER SYMPTOMS
SHORTNESS OF BREATH: 0
COUGH: 0
FEVER: 0
NERVOUS/ANXIOUS: 0
DEPRESSION: 0
DIARRHEA: 1
ABDOMINAL PAIN: 0

## 2019-09-22 NOTE — PROGRESS NOTES
Hospital Medicine Daily Progress Note    Date of Service  9/21/2019    Chief Complaint  49 y.o. male admitted 9/20/2019 with CP, extreme fatigue for past several months.    Hospital Course    9/21:  This 50 yo male with known IDDM, prior right TMA from  7/2018, chronic diarrhea for past year, diabetic retinopathy, + family history of early CAD in paternal grandfather presented with dull right sternal chest pain while at rest watching tv last night.  He states the pain lasted 15 seconds, but he had an abnormal sensation that radiated to his right neck, palpitations and shortness of breath that lasted about an hour.  He states he has been having extreme fatigue lately with ADL.  He recently went antelope hunting with his father and felt he was having more SOB and exercise intolerance than his baseline.  No smoking or alcohol use, lives with wife.  No prior known CAD or MI.  NM stress with no reversible ischemia seen, but moderate inferior area suspicious for prior infarct.  D-dimer mildly elevated at 0.54.  No recent surgeries or leg edema/pain.  Cr 1.4, IVFs started.  mildly elevated blood pressure.  Started on metoprolol 25mg bid, asa since admission, EKG reviewed with artifact seen inferior leads, repeat EKG ordered. trops 33.  CXR negative.  Consulted Dr. Engel regarding above concerns of fatigue, risk factors and defect seen on NM study.*        Consultants/Specialty  Dr. Engel    Code Status  full    Disposition  Cath tomorrow.  NPO at [facility]. Lives with wife, active lifestyle.    Review of Systems  Review of Systems   Constitutional: Negative for chills, diaphoresis, fever and malaise/fatigue.   HENT: Negative for congestion and sore throat.    Eyes: Negative for pain and discharge.   Respiratory: Positive for shortness of breath. Negative for cough, hemoptysis, sputum production and wheezing.    Cardiovascular: Positive for leg swelling. Negative for chest pain, palpitations and claudication.   Gastrointestinal:  Negative for abdominal pain, constipation, diarrhea, melena, nausea and vomiting.   Genitourinary: Negative for dysuria, frequency and urgency.   Musculoskeletal: Negative for back pain, joint pain, myalgias and neck pain.   Skin: Negative for itching and rash.   Neurological: Negative for dizziness, sensory change, speech change, focal weakness, loss of consciousness, weakness and headaches.   Endo/Heme/Allergies: Does not bruise/bleed easily.   Psychiatric/Behavioral: Negative for depression, substance abuse and suicidal ideas.        Physical Exam  Temp:  [36.2 °C (97.2 °F)-36.6 °C (97.9 °F)] 36.6 °C (97.9 °F)  Pulse:  [] 83  Resp:  [12-18] 18  BP: (115-175)/() 175/106  SpO2:  [95 %-99 %] 98 %    Physical Exam   Constitutional: He is oriented to person, place, and time. He appears well-developed and well-nourished. No distress.   HENT:   Head: Normocephalic and atraumatic.   Mouth/Throat: Oropharynx is clear and moist. No oropharyngeal exudate.   Eyes: Pupils are equal, round, and reactive to light. Conjunctivae and EOM are normal. Right eye exhibits no discharge. Left eye exhibits no discharge. No scleral icterus.   Neck: Normal range of motion. Neck supple. No JVD present. No tracheal deviation present. No thyromegaly present.   Cardiovascular: Normal rate, regular rhythm and normal heart sounds. Exam reveals no gallop and no friction rub.   No murmur heard.  Pulmonary/Chest: Effort normal and breath sounds normal. No respiratory distress. He has no wheezes. He has no rales. He exhibits no tenderness.   No reproducible chest wall pain on exam.   Abdominal: Soft. Bowel sounds are normal. He exhibits no distension and no mass. There is no tenderness. There is no rebound and no guarding.   Musculoskeletal: Normal range of motion. He exhibits no edema or tenderness.   No leg edema noted.   Healed right TMA.   Lymphadenopathy:     He has no cervical adenopathy.   Neurological: He is alert and oriented to  person, place, and time. No cranial nerve deficit. He exhibits normal muscle tone.   Skin: Skin is warm and dry. No rash noted. He is not diaphoretic. No erythema.   Psychiatric: He has a normal mood and affect. His behavior is normal. Judgment and thought content normal.   Nursing note and vitals reviewed.      Fluids  No intake or output data in the 24 hours ending 09/21/19 1803    Laboratory  Recent Labs     09/20/19 2049   WBC 10.4   RBC 4.64*   HEMOGLOBIN 14.0   HEMATOCRIT 40.9*   MCV 88.1   MCH 30.2   MCHC 34.2   RDW 42.0   PLATELETCT 266   MPV 10.2     Recent Labs     09/20/19 2049   SODIUM 141   POTASSIUM 4.2   CHLORIDE 105   CO2 28   GLUCOSE 86   BUN 24*   CREATININE 1.41*   CALCIUM 9.2                   Imaging  NM-CARDIAC STRESS TEST   Final Result      DX-CHEST-PORTABLE (1 VIEW)   Final Result         1.  No acute cardiopulmonary disease.           Assessment/Plan  * Chest pain- (present on admission)  Assessment & Plan  Extreme fatigue with ADL noted for past few months.  Episode of dull chest pressure/pain occurred at rest.  No prior CAD.  NM with inferior defect seen possible infarct.  Risk factors:  IDDM, family history, HTN  Cardiology consulted for possible cath in a.m.      Chronic fatigue  Assessment & Plan  Check TSH with free T4.    Renal insufficiency- (present on admission)  Assessment & Plan  Cr 1.4 on admission.  Last Cr normal 7/2018  Started IVFs /hr.  Repeat bmp in a.m.    Essential hypertension- (present on admission)  Assessment & Plan  Elevated BP since admission  Added metoprolol 25 bid to home lisinopril.    Type 2 diabetes mellitus with complication, with long-term current use of insulin (HCC)- (present on admission)  Assessment & Plan   correction dosing as needed  dc'd lantus since NPO at [facility] for cath in a.m.      S/P transmetatarsal amputation of foot, right (HCC)- (present on admission)  Assessment & Plan  Stable.       VTE prophylaxis: scds, cath in a.m.

## 2019-09-22 NOTE — CARE PLAN
Problem: Communication  Goal: The ability to communicate needs accurately and effectively will improve  Outcome: PROGRESSING AS EXPECTED  Intervention: [facility] patient and significant other/support system to call light to alert staff of needs  Flowsheets (Taken 9/22/2019 0421)  Oriented to:: All of the Following : Location of Bathroom, Visiting Policy, Unit Routine, Call Light and Bedside Controls, Bedside Rail Policy, Smoking Policy, Rights and Responsibilities, Bedside Report, and Patient Education Notebook     Problem: Bowel/Gastric:  Goal: Normal bowel function is maintained or improved  Outcome: PROGRESSING AS EXPECTED     Problem: Fluid Volume:  Goal: Will maintain balanced intake and output  Outcome: PROGRESSING AS EXPECTED

## 2019-09-22 NOTE — PROGRESS NOTES
Handoff report received. Assumed patient care. PT is reclined in bed, dinner is complete, AAOx4, no complaints of pain or discomfort. Tele box is on, POC discussed with PT and all questions addressed. Safety precautions in place. Call light and personal belongings within reach. Educated to call for assistance if needed.

## 2019-09-22 NOTE — CONSULTS
Cardiology Consult Note:    Rl Enegl  Date & Time note created:    9/21/2019   8:44 PM     Referring MD:  Dr. Aguila    Patient ID:   Name:             Beni Moore   YOB: 1970  Age:                 49 y.o.  male   MRN:               [id number]                                                             Reason for Consult:      Chest pain, palpitaitons    History of Present Illness:    49-year-old male with past medical history of long-standing type 2 diabetes.  He was at his normal state of health yesterday when he developed some palpitations with chest pain.  The chest pain described as sharp stabbing sensation in his mid chest without radiation reaching maximum intensity 5 out of 10.  It was not associated shortness of breath.  He then discussed this with his wife decided to bring him to the ER for evaluation.  In the ER his ECG was unremarkable.  His troponin is negative.  He was sent for nuclear stress test which I personally reviewed.  It does appear normal however it was read as some ischemia.  I discussed the finding with him.  Under no circumstances does not want anything other than a cardiac catheterization due to his concern for CAD.   Review of Systems:      Constitutional: Denies fevers, Denies weight changes  Eyes: Denies changes in vision, no eye pain  Ears/Nose/Throat/Mouth: Denies nasal congestion or sore throat   Cardiovascular: + chest pain, no palpitations   Respiratory: no shortness of breath , Denies cough  Gastrointestinal/Hepatic: Denies abdominal pain, nausea, vomiting, diarrhea, constipation or GI bleeding   Genitourinary: Denies dysuria or frequency  Musculoskeletal/Rheum: Denies  joint pain and swelling, noedema  Skin: Denies rash  Neurological: Denies headache, confusion, memory loss or focal weakness/parasthesias  Psychiatric: denies mood disorder   Endocrine: Kaylie thyroid problems  Heme/Oncology/Lymph Nodes: Denies enlarged lymph nodes, denies brusing or  known bleeding disorder  All other systems were reviewed and are negative (AMA/CMS criteria)                Past Medical History:   Past Medical History:   Diagnosis Date   • Diabetes ([facility])    • Hypertension      Active Hospital Problems    Diagnosis   • Type 2 diabetes mellitus with complication, with long-term current use of insulin ([facility]) [E11.8, Z79.4]   • Essential hypertension [I10]   • Chest pain [R07.9]   • Renal insufficiency [N28.9]   • Chronic fatigue [R53.82]   • S/P transmetatarsal amputation of foot, right ([facility]) [Z89.431]       Past Surgical History:  Past Surgical History:   Procedure Laterality Date   • TOE AMPUTATION Right 7/2/2018    Procedure: Transmetatarsal amputation;  Surgeon: Ravi Bernardo M.D.;  Location: SURGERY [facility];  Service: Orthopedics   • ACHILLES TENDON REPAIR Right 7/2/2018    Procedure: ACHILLES LENGTHENING;  Surgeon: Ravi Bernardo M.D.;  Location: SURGERY [facility];  Service: Orthopedics   • IRRIGATION & DEBRIDEMENT ORTHO Right 7/2/2018    Procedure: IRRIGATION & DEBRIDEMENT ORTHO;  Surgeon: Ravi Bernardo M.D.;  Location: SURGERY [facility];  Service: Orthopedics   • OTHER     • OTHER ABDOMINAL SURGERY     • OTHER ORTHOPEDIC SURGERY         Hospital Medications:  Current Facility-Administered Medications   Medication Dose   • lisinopril (PRINIVIL) tablet 40 mg  40 mg   • omeprazole (PRILOSEC) capsule 20 mg  20 mg   • acetaminophen (TYLENOL) tablet 650 mg  650 mg   • Pharmacy Consult Request ...Pain Management Review 1 Each  1 Each    And   • oxyCODONE immediate-release (ROXICODONE) tablet 2.5 mg  2.5 mg    And   • oxyCODONE immediate-release (ROXICODONE) tablet 5 mg  5 mg    And   • morphine (pf) 4 mg/ml injection 2 mg  2 mg   • cloNIDine (CATAPRES) tablet 0.1 mg  0.1 mg   • aspirin (ASA) tablet 325 mg  325 mg    Or   • aspirin (ASA) chewable tab 324 mg  324 mg    Or   • aspirin (ASA) suppository 300 mg  300 mg   • ondansetron (ZOFRAN) syringe/vial  injection 4 mg  4 mg   • ondansetron (ZOFRAN ODT) dispertab 4 mg  4 mg   • promethazine (PHENERGAN) tablet 12.5-25 mg  12.5-25 mg   • promethazine (PHENERGAN) suppository 12.5-25 mg  12.5-25 mg   • prochlorperazine (COMPAZINE) injection 5-10 mg  5-10 mg   • senna-docusate (PERICOLACE or SENOKOT S) 8.6-50 MG per tablet 2 Tab  2 Tab    And   • polyethylene glycol/lytes (MIRALAX) PACKET 1 Packet  1 Packet    And   • magnesium hydroxide (MILK OF MAGNESIA) suspension 30 mL  30 mL    And   • bisacodyl (DULCOLAX) suppository 10 mg  10 mg   • aminophylline injection 100 mg  100 mg   • insulin regular (HUMULIN R) injection 1-6 Units  1-6 Units    And   • glucose 4 g chewable tablet 16 g  16 g    And   • DEXTROSE 10% BOLUS 250 mL  250 mL   • NS infusion     • metoprolol (LOPRESSOR) tablet 25 mg  25 mg         Current Outpatient Medications:  Medications Prior to Admission   Medication Sig Dispense Refill Last Dose   • Eluxadoline (VIBERZI) 75 MG Tab Take 1 Tab by mouth 2 Times a Day.   9/20/2019 at PM   • lisinopril (PRINIVIL, ZESTRIL) 40 MG tablet Take 40 mg by mouth every day.   9/20/2019 at AM   • insulin glargine (LANTUS) 100 UNIT/ML Solution Inject 10 Units as instructed every evening.   9/20/2019 at PM   • glimepiride (AMARYL) 4 MG Tab Take 4 mg by mouth every day.  0 9/20/2019 at AM   • TRULICITY 1.5 MG/0.5ML Solution Pen-injector 1.5 mg by Injection route every 7 days.   9/17/2019 at UNK   • omeprazole (PRILOSEC) 20 MG delayed-release capsule Take 20 mg by mouth every day.   9/20/2019 at AM       Medication Allergy:  No Known Allergies    Family History:  Family History   Problem Relation Age of Onset   • No Known Problems Mother    • Diabetes Father    • Heart Disease Father        Social History:  Social History     Socioeconomic History   • Marital status:      Spouse name: Not on file   • Number of children: Not on file   • Years of education: Not on file   • Highest education level: Not on file    Occupational History   • Not on file   Social Needs   • Financial resource strain: Not on file   • Food insecurity:     Worry: Not on file     Inability: Not on file   • Transportation needs:     Medical: Not on file     Non-medical: Not on file   Tobacco Use   • Smoking status: Never Smoker   • Smokeless tobacco: Never Used   Substance and Sexual Activity   • Alcohol use: No   • Drug use: No   • Sexual activity: Not on file   Lifestyle   • Physical activity:     Days per week: Not on file     Minutes per session: Not on file   • Stress: Not on file   Relationships   • Social connections:     Talks on phone: Not on file     Gets together: Not on file     Attends Congregational service: Not on file     Active member of club or organization: Not on file     Attends meetings of clubs or organizations: Not on file     Relationship status: Not on file   • Intimate partner violence:     Fear of current or ex partner: Not on file     Emotionally abused: Not on file     Physically abused: Not on file     Forced sexual activity: Not on file   Other Topics Concern   • Not on file   Social History Narrative   • Not on file         Physical Exam:  Vitals/ General Appearance:   Weight/BMI: Body mass index is 30.89 kg/m².  /100   Pulse 85   Temp 36.8 °C (98.2 °F) (Temporal)   Resp 16   Ht 1.829 m (6')   Wt 103.3 kg (227 lb 11.8 oz)   SpO2 96%   Vitals:    09/21/19 1207 09/21/19 1226 09/21/19 1627 09/21/19 2000   BP:  144/93 (!) 175/106 159/100   Pulse:  86 83 85   Resp:  12 18 16   Temp:  36.4 °C (97.6 °F) 36.6 °C (97.9 °F) 36.8 °C (98.2 °F)   TempSrc:  Temporal Temporal Temporal   SpO2:  97% 98% 96%   Weight: 103.3 kg (227 lb 11.8 oz)      Height:         Oxygen Therapy:  Pulse Oximetry: 96 %, O2 Delivery: None (Room Air)    Constitutional:   Well developed, Well nourished, No acute distress  HENMT:  Normocephalic, Atraumatic, Oropharynx moist mucous membranes, No oral exudates, Nose normal.  No thyromegaly.  Eyes:   EOMI, Conjunctiva normal, No discharge.  Neck:  Normal range of motion, No cervical tenderness,  no JVD.  Cardiovascular:  Normal heart rate, Normal rhythm, No murmurs, No rubs, No gallops.   Extremitites with intact distal pulses, no cyanosis, or edema.  Lungs:  Normal breath sounds, breath sounds clear to auscultation bilaterally,  no crackles, no wheezing.   Abdomen: Bowel sounds normal, Soft, No tenderness, No guarding, No rebound, No masses, No hepatosplenomegaly.  Skin: Warm, Dry, No erythema, No rash, no induration.  Neurologic: Alert & oriented x 3, No focal deficits noted, cranial nerves II through X are grossly intact.  Psychiatric: Affect normal, Judgment normal, Mood normal.      MDM (Data Review):     Records reviewed and summarized in current documentation    Lab Data Review:  Recent Results (from the past 24 hour(s))   CBC with Differential    Collection Time: 09/20/19  8:49 PM   Result Value Ref Range    WBC 10.4 4.8 - 10.8 K/uL    RBC 4.64 (L) 4.70 - 6.10 M/uL    Hemoglobin 14.0 14.0 - 18.0 g/dL    Hematocrit 40.9 (L) 42.0 - 52.0 %    MCV 88.1 81.4 - 97.8 fL    MCH 30.2 27.0 - 33.0 pg    MCHC 34.2 33.7 - 35.3 g/dL    RDW 42.0 35.9 - 50.0 fL    Platelet Count 266 164 - 446 K/uL    MPV 10.2 9.0 - 12.9 fL    Neutrophils-Polys 62.00 44.00 - 72.00 %    Lymphocytes 26.40 22.00 - 41.00 %    Monocytes 8.30 0.00 - 13.40 %    Eosinophils 2.50 0.00 - 6.90 %    Basophils 0.60 0.00 - 1.80 %    Immature Granulocytes 0.20 0.00 - 0.90 %    Nucleated RBC 0.00 /100 WBC    Neutrophils (Absolute) 6.47 1.82 - 7.42 K/uL    Lymphs (Absolute) 2.75 1.00 - 4.80 K/uL    Monos (Absolute) 0.87 (H) 0.00 - 0.85 K/uL    Eos (Absolute) 0.26 0.00 - 0.51 K/uL    Baso (Absolute) 0.06 0.00 - 0.12 K/uL    Immature Granulocytes (abs) 0.02 0.00 - 0.11 K/uL    NRBC (Absolute) 0.00 K/uL   Complete Metabolic Panel (CMP)    Collection Time: 09/20/19  8:49 PM   Result Value Ref Range    Sodium 141 135 - 145 mmol/L    Potassium 4.2 3.6 - 5.5  mmol/L    Chloride 105 96 - 112 mmol/L    Co2 28 20 - 33 mmol/L    Anion Gap 8.0 0.0 - 11.9    Glucose 86 65 - 99 mg/dL    Bun 24 (H) 8 - 22 mg/dL    Creatinine 1.41 (H) 0.50 - 1.40 mg/dL    Calcium 9.2 8.5 - 10.5 mg/dL    AST(SGOT) 27 12 - 45 U/L    ALT(SGPT) 25 2 - 50 U/L    Alkaline Phosphatase 47 30 - 99 U/L    Total Bilirubin 0.8 0.1 - 1.5 mg/dL    Albumin 4.5 3.2 - 4.9 g/dL    Total Protein 7.2 6.0 - 8.2 g/dL    Globulin 2.7 1.9 - 3.5 g/dL    A-G Ratio 1.7 g/dL   Troponin    Collection Time: 19  8:49 PM   Result Value Ref Range    Troponin T 35 (H) 6 - 19 ng/L   ESTIMATED GFR    Collection Time: 19  8:49 PM   Result Value Ref Range    GFR If African American >60 >60 mL/min/1.73 m 2    GFR If Non  53 (A) >60 mL/min/1.73 m 2   TROPONIN    Collection Time: 19 10:53 PM   Result Value Ref Range    Troponin T 34 (H) 6 - 19 ng/L   ACCU-CHEK GLUCOSE    Collection Time: 19  1:06 AM   Result Value Ref Range    Glucose - Accu-Ck 81 65 - 99 mg/dL   ACCU-CHEK GLUCOSE    Collection Time: 19  2:03 AM   Result Value Ref Range    Glucose - Accu-Ck 78 65 - 99 mg/dL   ACCU-CHEK GLUCOSE    Collection Time: 19  3:04 AM   Result Value Ref Range    Glucose - Accu-Ck 87 65 - 99 mg/dL   Troponin in four (4) hours    Collection Time: 19  5:36 AM   Result Value Ref Range    Troponin T 33 (H) 6 - 19 ng/L   ACCU-CHEK GLUCOSE    Collection Time: 19  6:26 AM   Result Value Ref Range    Glucose - Accu-Ck 117 (H) 65 - 99 mg/dL   D-DIMER    Collection Time: 19  4:14 PM   Result Value Ref Range    D-Dimer Screen 0.57 (H) 0.00 - 0.50 ug/mL (FEU)   EKG    Collection Time: 19  6:22 PM   Result Value Ref Range    Report       [facility]    Test Date:  2019  Pt Name:    HELIO BARROW                     Department: 171  MRN:        [id number]                      Room:       [facility]  Gender:     Male                         Technician: CHEMO  :        1970                    Requested By:LINO RASHEED  Order #:    [id number]                    Reading MD:    Measurements  Intervals                                Axis  Rate:       83                           P:          26  IA:         128                          QRS:        73  QRSD:       112                          T:          9  QT:         390  QTc:        459    Interpretive Statements  SINUS RHYTHM  BORDERLINE INTRAVENTRICULAR CONDUCTION DELAY  Compared to ECG 09/20/2019 20:39:03  Atrial premature complex(es) no longer present  T-wave abnormality no longer present         Imaging/Procedures Review:    Chest Xray:  Reviewed    EKG:   Personally viewed by myself showing normal sinus rhythm with nonspecific ST segment changes.    ECHO:  N/A.  MDM (Assessment and Plan):     Active Hospital Problems    Diagnosis   • Type 2 diabetes mellitus with complication, with long-term current use of insulin (HCC) [E11.8, Z79.4]   • Essential hypertension [I10]   • Chest pain [R07.9]   • Renal insufficiency [N28.9]   • Chronic fatigue [R53.82]   • S/P transmetatarsal amputation of foot, right (HCC) [Z89.431]     49-year-old male with long-standing type 2 diabetes and some renal insufficiency.  I discussed with him the risk benefits and alternatives of proceeding to cardiac authorization versus treadmill stress test versus simply calling his noncardiac chest pain given his description.  Requesting and asking for a cardiac catheterization is a class I indication for cath which he has adamantly requested.  He will be hydrated overnight.  If his creatinine does not improve we will can select cath.  Otherwise we will do a coronary angiogram in the morning.

## 2019-09-22 NOTE — PROCEDURES
DATE OF SERVICE:  09/22/2019    REFERRING PHYSICIAN:  Rl Engel MD    PROCEDURES:  1.  Left heart catheterization.  2.  Coronary angiography.  3.  PTCA/stent placement of the distal right coronary artery.  4.  Left ventriculogram.  5.  Monitor of conscious sedation.    PREPROCEDURE DIAGNOSES:  1.  Chest pain.  2.  Abnormal myocardial perfusion study.    POSTPROCEDURE DIAGNOSES:  1.  Coronary artery disease with high-grade distal right coronary artery   stenosis, nonobstructive proximal left anterior descending artery and mid to   distal left anterior descending artery stenosis.  2.  Successful percutaneous transluminal coronary angioplasty/stent placement   of the distal right coronary artery with 2.5x20 mm Synergy drug-eluting stent.  3.  Normal left ventricular systolic function with ejection fraction of 65%.  4.  Elevated left ventricular end-diastolic pressure.    INDICATION:  The patient is a 49-year-old male with past medical history   significant for hypertension, hyperlipidemia and diabetes.  He was admitted to  [facility] with chest pain and underwent a myocardial perfusion   study, which was abnormal.  He was then scheduled for cardiac catheterization.    DESCRIPTION OF PROCEDURE:  After informed consent was signed by the   patient, patient was brought to the cardiac catheterization laboratory.  He was   prepped and draped in the usual sterile manner.  The right wrist area was   anesthetized with 2% Xylocaine.  Attempts were made to cannulate the right   radial artery; however, this was unsuccessful due to significant plaque with   ultrasound guidance.  The right inguinal area was anesthetized with 2%   Xylocaine.  A 4-Gambian sheath was inserted into the right femoral artery using   modified Seldinger technique under ultrasound guidance.  Through the arterial   sheath, a 4-Gambian pigtail catheter was positioned into the left ventricle.    Left angiography was performed.  This was  exchanged for a 4-Namibian JL-5   catheter, which was positioned into the left main coronary artery.  Coronary   angiography was performed.  This was exchanged for a 4-Namibian 3DRC catheter,   which was positioned into the right coronary artery.  Coronary angiography was   performed.  This catheter was removed.  The sheath was upgraded to 6 Namibian.    IV Angiomax was started.  A 3DRC guide catheter was positioned into the right   coronary artery.  A Prowater wire was used to cross the identified stenosis.    The stenosis was predilated with 2.5x50 mm TREK balloon.  A 2.5x20 mm Synergy   drug-eluting stent was successfully positioned and deployed.  This stent was   postdilated with 2.5x50 mm NC TREK balloon.  Patient tolerated the procedure   well.  At the end of procedure, all wires, balloons, guides and sheaths were   removed.  The right femoral arteriotomy site was closed via the Angio-Seal   system.  He was then transferred to telemetry in stable condition.    HEMODYNAMIC DATA:  Hemodynamic data shows aortic pressures of 180/90 with mean   of 120 mmHg and /0 with LVEDP of 18 mmHg.    AORTIC VALVE:  There was no significant gradient noted.    LEFT VENTRICULOGRAM:  A 10 mL of contrast was delivered for 3 seconds.    Ejection fraction was elevated to be 65%.  There were no segmental wall motion   abnormalities noted.    ANGIOGRAM:  1.  Left main coronary artery:  Left main coronary artery is a long, very   large-caliber vessel free of disease.  2.  Left anterior descending artery:  Left anterior descending artery is a   long, very large-caliber vessel, which wraps around the apex.  Proximal   portion of the vessel, there is a concentric 40-50% stenosis.  Mid to distal   portion, there is an eccentric 40% stenosis.  There is a large-caliber   diagonal branch noted free of disease.  3.  Ramus intermedius:  Ramus intermedius is a short, small-caliber vessel   free of disease.  4.  Left circumflex artery:  Left  circumflex artery is a nondominant, very   large-caliber vessel with large-caliber distal bifurcating obtuse marginal   branch and posterolateral branch.  Left circumflex artery and its branches are   free of disease.  5.  Right coronary artery:  Right coronary is a large-caliber vessel with mid   eccentric 40-50% stenosis.  Mid to distal portion of the vessel, there is a   second eccentric 40-50% stenosis.  Distal portion of the vessel, there is a   concentric 90% stenosis.  Beyond this, there is a moderate-caliber posterior   descending artery branch noted with luminal irregularities of 20-30%.    PERCUTANEOUS INTERVENTION:  1.  Distal right coronary artery concentric 90% stenosis with 0% residual.    LULÚ-3 flow pre and post procedure.    Predilatation with 2.5x50 mm TREK balloon.    Stent with 2.5x20 mm Synergy drug-eluting stent.    Postdilatation with 2.5x50 mm NC Emerge balloon.    IMPRESSION:  1.  Coronary artery disease with high-grade distal right coronary artery   stenosis, nonobstructive proximal left anterior descending artery and mid to   distal left anterior descending artery stenosis.  2.  Successful percutaneous transluminal coronary angioplasty/stent placement   of the distal right coronary artery with 2.5x20 mm Synergy drug-eluting stent.  3.  Normal left ventricular systolic function with ejection fraction of 65%.  4.  Elevated left ventricular end-diastolic pressure.    RECOMMENDATIONS:  Recommend medical therapy with addition of Brilinta.    SEDATION TIME: The patient's sedation was managed by myself with continuous   face to face time with the patient for 15 minutes from 14:40 to 14:55.           ____________________________________     MD ISABELL KENDALL / KATARZYNA    DD:  09/22/2019 15:35:56  DT:  09/22/2019 15:51:30    D#:  [id number]  Job#:  [id number]

## 2019-09-22 NOTE — CARE PLAN
Problem: Safety  Goal: Will remain free from injury  9/21/2019 2025 by Laura Bowers R.N.  Outcome: PROGRESSING AS EXPECTED  Note:   Patient's risk for injury and falls assessed. Appropriate safety precautions in place. Patient educated to utilize call light for needs. Patient verbalizes understanding.        Problem: Infection  Goal: Will remain free from infection  9/21/2019 2025 by Laura Bowers R.N.  Outcome: PROGRESSING AS EXPECTED  Note:   Patient WBC within normal limits. No open wounds noted on patient. Patient does not complain of SOB. Patient vital signs are stable.

## 2019-09-22 NOTE — PROGRESS NOTES
Bedside report received from nurse. Assumed care of pt. Pt resting comfortably in bed.  VSS,  All needs met. POC reviewed and white board updated. Tele box on. Call light in reach. Bed locked in lowest position with 2 upper bed rails up.'

## 2019-09-22 NOTE — PROGRESS NOTES
Hospital Medicine Daily Progress Note    Date of Service: 9/22/2019 Code Status: Full Code     Chief Complaint  Chief Complaint   Patient presents with   • Chest Pain       Consultants/Specialty: Cardiology, Interventional Cardiology Disposition: Roll Patient to IP for stenting in Cath Lab     Hospital Course     ER and Admission Day course  9/21:  This 50 yo male with known IDDM, prior right TMA from  7/2018, chronic diarrhea for past year, diabetic retinopathy, + family history of early CAD in paternal grandfather presented with dull right sternal chest pain while at rest watching tv last night.  He states the pain lasted 15 seconds, but he had an abnormal sensation that radiated to his right neck, palpitations and shortness of breath that lasted about an hour.  He states he has been having extreme fatigue lately with ADL.  He recently went antelope hunting with his father and felt he was having more SOB and exercise intolerance than his baseline.  No smoking or alcohol use, lives with wife.  No prior known CAD or MI.  NM stress with no reversible ischemia seen, but moderate inferior area suspicious for prior infarct.  D-dimer mildly elevated at 0.54.  No recent surgeries or leg edema/pain.  Cr 1.4, IVFs started.  mildly elevated blood pressure.  Started on metoprolol 25mg bid, asa since admission, EKG reviewed with artifact seen inferior leads, repeat EKG ordered. trops 33.  CXR negative.  Consulted Dr. Engel regarding above concerns of fatigue, risk factors and defect seen on NM study.  9/22 -  Cath Lab with 1 stent placed       Interval Problem Update  Patient was seen and evaluated today for Chest Pressure      Review of Systems  Review of Systems   Constitutional: Positive for malaise/fatigue. Negative for fever.   Respiratory: Negative for cough and shortness of breath.    Cardiovascular: Positive for chest pain. Negative for leg swelling.   Gastrointestinal: Positive for diarrhea. Negative for abdominal  pain.   Skin: Negative for itching and rash.   Psychiatric/Behavioral: Negative for depression. The patient is not nervous/anxious.     Physical Exam  Physical Exam   Constitutional: No distress.   Cardiovascular: Normal rate and regular rhythm.   Pulmonary/Chest: No respiratory distress. He has no wheezes.   Abdominal: He exhibits no distension. There is no tenderness.   Neurological: He is alert. Coordination normal.   Skin: Skin is warm and dry. He is not diaphoretic. No erythema.   Nursing note and vitals reviewed.       I/Os    Intake/Output Summary (Last 24 hours) at 9/22/2019 0809  Last data filed at 9/21/2019 2000  Gross per 24 hour   Intake 120 ml   Output --   Net 120 ml    Vital Signs  Temp:  [36.1 °C (97 °F)-36.8 °C (98.2 °F)] 36.1 °C (97 °F)  Pulse:  [83-89] 89  Resp:  [12-18] 18  BP: (116-175)/() 153/96  SpO2:  [95 %-98 %] 97 %     Laboratory  Results from last 7 days   Lab Units 09/22/19  0405 09/20/19  2049   WBC 1501 K/uL 7.4 10.4   HGB 1503 g/dL 14.2 14.0   HCT 1504 % 40.4* 40.9*   PLATELET COUNT 1518 K/uL 263 266     Results from last 7 days   Lab Units 09/22/19  0405 09/20/19  2049   SODIUM 101 mmol/L 139 141   POTASSIUM 102 mmol/L 3.9 4.2   CHLORIDE 103 mmol/L 109 105   CO2 104 mmol/L 22 28   ANION GAP ANION  8.0 8.0    mg/dL 18 24*   CREATININE 109 mg/dL 1.04 1.41*   CALCIUM 105 mg/dL 8.7 9.2   GLUCOSE 112 mg/dL 143* 86   IF ADRIAN AMER 262057 mL/min/1.73 m 2 >60 >60   IF NON AFRICAN AMER 109GFRB mL/min/1.73 m 2 >60 53*    Imaging  NM-CARDIAC STRESS TEST   Final Result      DX-CHEST-PORTABLE (1 VIEW)   Final Result         1.  No acute cardiopulmonary disease.            Medications    Scheduled medications:  [START ON 9/23/2019] aspirin EC 81 mg Oral DAILY   [START ON 9/23/2019] ticagrelor 90 mg Oral BID   atorvastatin 40 mg Oral QHS   lisinopril 40 mg Oral DAILY   omeprazole 20 mg Oral DAILY   Pharmacy Consult Request 1 Each Other PHARMACY TO DOSE   senna-docusate 2 Tab Oral BID    insulin regular 1-6 Units Subcutaneous Q6HRS   metoprolol 25 mg Oral TWICE DAILY           Medications were reviewed today.09/22/19        Assessment/Plan  * Chest pain- (present on admission)  Assessment & Plan  9/22- Cath this AM.  9/21 - adm - Extreme fatigue with ADL noted for past few months. Episode of dull chest pressure/pain occurred at rest. No prior CAD.  NM with inferior defect seen possible infarct. Risk factors:  IDDM, family history, HTN. Cardiology consulted for possible cath in a.m.  Results from last 7 days   Lab Units 09/21/19  0536 09/20/19  2253 09/20/19 2049   TROPONIN T [id number]  ng/L 33* 34* 35*           Chronic fatigue- (present on admission)  Assessment & Plan  9/22 - normal  9/21- adm - Check TSH with free T4.  Recent Labs     09/22/19  0405   TSHULTRASEN 2.430         AXEL (acute kidney injury) (HCC)- (present on admission)  Assessment & Plan  9/22 - resolved.  9/21- adm - Cr 1.4 on admission. Last Cr normal 7/2018. Started IVFs /hr. Repeat bmp in a.m.  Results from last 7 days   Lab Units 09/22/19  0405 09/20/19 2049   POTASSIUM 102 mmol/L 3.9 4.2   CREATININE 109 mg/dL 1.04 1.41*   IF ADRIAN AMER [id number] mL/min/1.73 m 2 >60 >60   IF NON AFRICAN AMER 109GFRB mL/min/1.73 m 2 >60 53*            Essential hypertension- (present on admission)  Assessment & Plan  9/22 - Metoprolol would not seem to have had any effect.   9/21- adm - Elevated BP since admission. Added metoprolol 25 bid to home lisinopril.    Type 2 diabetes mellitus with complication, with long-term current use of insulin (HCC)- (present on admission)  Assessment & Plan  9/22 - Stable  9/21 - adm - correction dosing as needed. mary'd lantus since NPO at [facility] for cath in a.m.  Results from last 7 days   Lab Units 09/22/19  0536 09/22/19  0021 09/21/19  0626 09/21/19  0304 09/21/19  0203 09/21/19  0106   ACCU CHECK GLUCOSE 788 mg/dL 147* 114* 117* 87 78 81           S/P transmetatarsal amputation of foot, right (HCC)- (present  on admission)  Assessment & Plan  9/22 - Stable  9/21 - adm - Stable.       VTE prophylaxis: scds

## 2019-09-23 ENCOUNTER — APPOINTMENT (OUTPATIENT)
Dept: CARDIOLOGY | Facility: MEDICAL CENTER | Age: 49
DRG: 247 | End: 2019-09-23
Attending: NURSE PRACTITIONER
Payer: COMMERCIAL

## 2019-09-23 LAB
ANION GAP SERPL CALC-SCNC: 7 MMOL/L (ref 0–11.9)
BUN SERPL-MCNC: 13 MG/DL (ref 8–22)
CALCIUM SERPL-MCNC: 8.9 MG/DL (ref 8.5–10.5)
CHLORIDE SERPL-SCNC: 108 MMOL/L (ref 96–112)
CO2 SERPL-SCNC: 25 MMOL/L (ref 20–33)
CREAT SERPL-MCNC: 0.91 MG/DL (ref 0.5–1.4)
ERYTHROCYTE [DISTWIDTH] IN BLOOD BY AUTOMATED COUNT: 40.4 FL (ref 35.9–50)
GLUCOSE BLD-MCNC: 156 MG/DL (ref 65–99)
GLUCOSE BLD-MCNC: 157 MG/DL (ref 65–99)
GLUCOSE BLD-MCNC: 159 MG/DL (ref 65–99)
GLUCOSE BLD-MCNC: 181 MG/DL (ref 65–99)
GLUCOSE SERPL-MCNC: 118 MG/DL (ref 65–99)
HCT VFR BLD AUTO: 39.5 % (ref 42–52)
HGB BLD-MCNC: 13.9 G/DL (ref 14–18)
MCH RBC QN AUTO: 31 PG (ref 27–33)
MCHC RBC AUTO-ENTMCNC: 35.2 G/DL (ref 33.7–35.3)
MCV RBC AUTO: 88 FL (ref 81.4–97.8)
PLATELET # BLD AUTO: 241 K/UL (ref 164–446)
PMV BLD AUTO: 10.4 FL (ref 9–12.9)
POTASSIUM SERPL-SCNC: 3.8 MMOL/L (ref 3.6–5.5)
RBC # BLD AUTO: 4.49 M/UL (ref 4.7–6.1)
SODIUM SERPL-SCNC: 140 MMOL/L (ref 135–145)
WBC # BLD AUTO: 6.3 K/UL (ref 4.8–10.8)

## 2019-09-23 PROCEDURE — A9270 NON-COVERED ITEM OR SERVICE: HCPCS | Performed by: INTERNAL MEDICINE

## 2019-09-23 PROCEDURE — 96372 THER/PROPH/DIAG INJ SC/IM: CPT

## 2019-09-23 PROCEDURE — A9270 NON-COVERED ITEM OR SERVICE: HCPCS | Performed by: HOSPITALIST

## 2019-09-23 PROCEDURE — 700102 HCHG RX REV CODE 250 W/ 637 OVERRIDE(OP): Performed by: HOSPITALIST

## 2019-09-23 PROCEDURE — 770020 HCHG ROOM/CARE - TELE (206)

## 2019-09-23 PROCEDURE — 99232 SBSQ HOSP IP/OBS MODERATE 35: CPT | Performed by: STUDENT IN AN ORGANIZED HEALTH CARE EDUCATION/TRAINING PROGRAM

## 2019-09-23 PROCEDURE — 99233 SBSQ HOSP IP/OBS HIGH 50: CPT | Performed by: INTERNAL MEDICINE

## 2019-09-23 PROCEDURE — 700102 HCHG RX REV CODE 250 W/ 637 OVERRIDE(OP): Performed by: INTERNAL MEDICINE

## 2019-09-23 PROCEDURE — 85027 COMPLETE CBC AUTOMATED: CPT

## 2019-09-23 PROCEDURE — 82962 GLUCOSE BLOOD TEST: CPT | Mod: 91

## 2019-09-23 PROCEDURE — 80048 BASIC METABOLIC PNL TOTAL CA: CPT

## 2019-09-23 PROCEDURE — 36415 COLL VENOUS BLD VENIPUNCTURE: CPT

## 2019-09-23 PROCEDURE — 700101 HCHG RX REV CODE 250: Performed by: STUDENT IN AN ORGANIZED HEALTH CARE EDUCATION/TRAINING PROGRAM

## 2019-09-23 RX ORDER — LABETALOL HYDROCHLORIDE 5 MG/ML
10 INJECTION, SOLUTION INTRAVENOUS ONCE
Status: COMPLETED | OUTPATIENT
Start: 2019-09-23 | End: 2019-09-23

## 2019-09-23 RX ORDER — ATORVASTATIN CALCIUM 80 MG/1
80 TABLET, FILM COATED ORAL
Status: DISCONTINUED | OUTPATIENT
Start: 2019-09-23 | End: 2019-09-27 | Stop reason: HOSPADM

## 2019-09-23 RX ADMIN — METOPROLOL TARTRATE 25 MG: 25 TABLET, FILM COATED ORAL at 18:31

## 2019-09-23 RX ADMIN — INSULIN HUMAN 1 UNITS: 100 INJECTION, SOLUTION PARENTERAL at 13:39

## 2019-09-23 RX ADMIN — LISINOPRIL 40 MG: 20 TABLET ORAL at 06:17

## 2019-09-23 RX ADMIN — TICAGRELOR 90 MG: 90 TABLET ORAL at 06:19

## 2019-09-23 RX ADMIN — OMEPRAZOLE 20 MG: 20 CAPSULE, DELAYED RELEASE ORAL at 06:17

## 2019-09-23 RX ADMIN — TICAGRELOR 90 MG: 90 TABLET ORAL at 18:00

## 2019-09-23 RX ADMIN — INSULIN HUMAN 1 UNITS: 100 INJECTION, SOLUTION PARENTERAL at 21:58

## 2019-09-23 RX ADMIN — ASPIRIN 81 MG: 81 TABLET, COATED ORAL at 06:18

## 2019-09-23 RX ADMIN — INSULIN HUMAN 1 UNITS: 100 INJECTION, SOLUTION PARENTERAL at 09:34

## 2019-09-23 RX ADMIN — ATORVASTATIN CALCIUM 80 MG: 80 TABLET, FILM COATED ORAL at 21:50

## 2019-09-23 RX ADMIN — INSULIN HUMAN 1 UNITS: 100 INJECTION, SOLUTION PARENTERAL at 18:45

## 2019-09-23 RX ADMIN — METOPROLOL TARTRATE 25 MG: 25 TABLET, FILM COATED ORAL at 06:18

## 2019-09-23 RX ADMIN — LABETALOL HYDROCHLORIDE 10 MG: 5 INJECTION INTRAVENOUS at 12:52

## 2019-09-23 ASSESSMENT — ENCOUNTER SYMPTOMS
DIZZINESS: 0
BLOOD IN STOOL: 0
DIARRHEA: 0
FEVER: 0
CHILLS: 0
COUGH: 0
CHEST TIGHTNESS: 0
NERVOUS/ANXIOUS: 0
ABDOMINAL PAIN: 0
NAUSEA: 0
VOMITING: 0
PALPITATIONS: 0
SHORTNESS OF BREATH: 0
DEPRESSION: 0

## 2019-09-23 NOTE — CARE PLAN
Problem: Communication  Goal: The ability to communicate needs accurately and effectively will improve  Outcome: PROGRESSING AS EXPECTED     Problem: Safety  Goal: Will remain free from falls  Outcome: PROGRESSING AS EXPECTED     Problem: Fluid Volume:  Goal: Will maintain balanced intake and output  Outcome: PROGRESSING AS EXPECTED

## 2019-09-23 NOTE — PROGRESS NOTES
Received report from Veronica Rangel. Patient post cath, able to get up at 20:00. Will resume IVF after current Angiomax infusion. Examined cath site, right femoral, intact, no signs of bleeding or hematoma.

## 2019-09-23 NOTE — PROGRESS NOTES
Patient arrived from cath with right femoral dressing intact. Patient alert and resting comfortably in bed. Call bell within reach.

## 2019-09-23 NOTE — PROGRESS NOTES
Administered 0.1 mg of clonidine, per PRN order for DBP over 100, last /104. Continue to monitor BP. Cath site CDI.

## 2019-09-24 ENCOUNTER — APPOINTMENT (OUTPATIENT)
Dept: CARDIOLOGY | Facility: MEDICAL CENTER | Age: 49
DRG: 247 | End: 2019-09-24
Attending: NURSE PRACTITIONER
Payer: COMMERCIAL

## 2019-09-24 LAB
CHOLEST SERPL-MCNC: 111 MG/DL (ref 100–199)
GLUCOSE BLD-MCNC: 133 MG/DL (ref 65–99)
GLUCOSE BLD-MCNC: 143 MG/DL (ref 65–99)
GLUCOSE BLD-MCNC: 163 MG/DL (ref 65–99)
GLUCOSE BLD-MCNC: 173 MG/DL (ref 65–99)
GLUCOSE BLD-MCNC: 185 MG/DL (ref 65–99)
HDLC SERPL-MCNC: 33 MG/DL
LACTATE BLD-SCNC: 1.4 MMOL/L (ref 0.5–2)
LDLC SERPL CALC-MCNC: 64 MG/DL
LV EJECT FRACT  99904: 65
LV EJECT FRACT MOD 2C 99903: 49.21
LV EJECT FRACT MOD 4C 99902: 64.01
LV EJECT FRACT MOD BP 99901: 58.55
TRIGL SERPL-MCNC: 71 MG/DL (ref 0–149)
TROPONIN T SERPL-MCNC: 48 NG/L (ref 6–19)

## 2019-09-24 PROCEDURE — 84484 ASSAY OF TROPONIN QUANT: CPT

## 2019-09-24 PROCEDURE — 770020 HCHG ROOM/CARE - TELE (206)

## 2019-09-24 PROCEDURE — 93005 ELECTROCARDIOGRAM TRACING: CPT | Performed by: HOSPITALIST

## 2019-09-24 PROCEDURE — 36415 COLL VENOUS BLD VENIPUNCTURE: CPT

## 2019-09-24 PROCEDURE — 83605 ASSAY OF LACTIC ACID: CPT

## 2019-09-24 PROCEDURE — 700102 HCHG RX REV CODE 250 W/ 637 OVERRIDE(OP): Performed by: INTERNAL MEDICINE

## 2019-09-24 PROCEDURE — A9270 NON-COVERED ITEM OR SERVICE: HCPCS | Performed by: HOSPITALIST

## 2019-09-24 PROCEDURE — 700102 HCHG RX REV CODE 250 W/ 637 OVERRIDE(OP): Performed by: HOSPITALIST

## 2019-09-24 PROCEDURE — 80061 LIPID PANEL: CPT

## 2019-09-24 PROCEDURE — 99233 SBSQ HOSP IP/OBS HIGH 50: CPT | Performed by: INTERNAL MEDICINE

## 2019-09-24 PROCEDURE — 99232 SBSQ HOSP IP/OBS MODERATE 35: CPT | Performed by: INTERNAL MEDICINE

## 2019-09-24 PROCEDURE — 93306 TTE W/DOPPLER COMPLETE: CPT | Mod: 26 | Performed by: INTERNAL MEDICINE

## 2019-09-24 PROCEDURE — A9270 NON-COVERED ITEM OR SERVICE: HCPCS | Performed by: INTERNAL MEDICINE

## 2019-09-24 PROCEDURE — 82962 GLUCOSE BLOOD TEST: CPT | Mod: 91

## 2019-09-24 PROCEDURE — 93306 TTE W/DOPPLER COMPLETE: CPT

## 2019-09-24 RX ORDER — CARVEDILOL 25 MG/1
25 TABLET ORAL 2 TIMES DAILY WITH MEALS
Status: DISCONTINUED | OUTPATIENT
Start: 2019-09-24 | End: 2019-09-25

## 2019-09-24 RX ORDER — AMLODIPINE BESYLATE 10 MG/1
10 TABLET ORAL
Status: DISCONTINUED | OUTPATIENT
Start: 2019-09-24 | End: 2019-09-25

## 2019-09-24 RX ORDER — HYDROCHLOROTHIAZIDE 25 MG/1
25 TABLET ORAL
Status: DISCONTINUED | OUTPATIENT
Start: 2019-09-25 | End: 2019-09-25

## 2019-09-24 RX ADMIN — CARVEDILOL 25 MG: 25 TABLET, FILM COATED ORAL at 09:10

## 2019-09-24 RX ADMIN — INSULIN HUMAN 1 UNITS: 100 INJECTION, SOLUTION PARENTERAL at 12:50

## 2019-09-24 RX ADMIN — METOPROLOL TARTRATE 25 MG: 25 TABLET, FILM COATED ORAL at 05:14

## 2019-09-24 RX ADMIN — ASPIRIN 81 MG: 81 TABLET, COATED ORAL at 05:13

## 2019-09-24 RX ADMIN — OMEPRAZOLE 20 MG: 20 CAPSULE, DELAYED RELEASE ORAL at 05:13

## 2019-09-24 RX ADMIN — AMLODIPINE BESYLATE 10 MG: 10 TABLET ORAL at 09:10

## 2019-09-24 RX ADMIN — TICAGRELOR 90 MG: 90 TABLET ORAL at 05:13

## 2019-09-24 RX ADMIN — TICAGRELOR 90 MG: 90 TABLET ORAL at 17:37

## 2019-09-24 RX ADMIN — LISINOPRIL 40 MG: 20 TABLET ORAL at 05:13

## 2019-09-24 RX ADMIN — CLONIDINE HYDROCHLORIDE 0.1 MG: 0.1 TABLET ORAL at 05:14

## 2019-09-24 RX ADMIN — CARVEDILOL 25 MG: 25 TABLET, FILM COATED ORAL at 17:37

## 2019-09-24 ASSESSMENT — ENCOUNTER SYMPTOMS
DIZZINESS: 0
CHILLS: 0
NAUSEA: 0
FEVER: 0
COUGH: 0
HEADACHES: 0
DOUBLE VISION: 0
HEARTBURN: 0
BLURRED VISION: 0
CONSTIPATION: 0
ABDOMINAL PAIN: 0
MYALGIAS: 0
VOMITING: 0
DIARRHEA: 0

## 2019-09-24 NOTE — DISCHARGE PLANNING
Care Transition Team Assessment    Information Source  Orientation : Oriented x 4  Information Given By: Patient         Elopement Risk  Legal Hold: No  Ambulatory or Self Mobile in Wheelchair: Yes  Disoriented: No  Psychiatric Symptoms: None  History of Wandering: No  Elopement this Admit: No  Vocalizing Wanting to Leave: No  Displays Behaviors, Body Language Wanting to Leave: No-Not at Risk for Elopement  Elopement Risk: Not at Risk for Elopement    Interdisciplinary Discharge Planning  Does Admitting Nurse Feel This Could be a Complex Discharge?: No  Primary Care Physician: Needs new one  Lives with - Patient's Self Care Capacity: Spouse  Patient or legal guardian wants to designate a caregiver (see row info): No  Support Systems: Spouse / Significant Other  Housing / Facility: [address]  Do You Take your Prescribed Medications Regularly: Yes  Able to Return to Previous ADL's: Yes  Mobility Issues: No  Prior Services: None  Patient Expects to be Discharged to:: home  Assistance Needed: No  Durable Medical Equipment: Not Applicable    Discharge Preparedness  What is your plan after discharge?: Home with help  What are your discharge supports?: Spouse  Prior Functional Level: Ambulatory, Drives Self, Independent with Activities of Daily Living, Independent with Medication Management    Functional Assesment  Prior Functional Level: Ambulatory, Drives Self, Independent with Activities of Daily Living, Independent with Medication Management         Vision / Hearing Impairment  Vision Impairment : No  Hearing Impairment : Yes  Hearing Impairment: Right Ear  Does Pt Need Special Equipment for the Hearing Impaired?: No              Domestic Abuse  Have you ever been the victim of abuse or violence?: No  Physical Abuse or Sexual Abuse: No  Verbal Abuse or Emotional Abuse: No  Possible Abuse Reported to:: Not Applicable              Anticipated Discharge Information  Anticipated discharge disposition: Home

## 2019-09-24 NOTE — PROGRESS NOTES
Cardiology Follow Up Progress Note    Date of Service  9/23/2019    Attending Physician  Edi Lo D.O.    Chief Complaint   ACS     HPI  Beni Moore is a 49 y.o. male admitted 9/20/2019 with chest pain, palpitations, found to have an abnormal stress test, now status post PCI.  Troponin 35, 34, 33.      Interim Events  Tele- sinus  Had heart cath, had one stent placed, had residual disease.  Initial presentation was chest tightness after palpitations.  No more chest pain, no more palpitations.      Cr 0.91  hbg 13.9  Pl 241    Review of Systems  Review of Systems   Constitutional: Negative for chills and fever.   Gastrointestinal: Negative for nausea and vomiting.       Vital signs in last 24 hours  Temp:  [36.3 °C (97.4 °F)-37 °C (98.6 °F)] 36.7 °C (98.1 °F)  Pulse:  [81-87] 85  Resp:  [12-18] 12  BP: (158-179)/(101-114) 163/104  SpO2:  [93 %-97 %] 97 %    Physical Exam  Physical Exam     General: No acute distress. Well nourished.  HEENT: EOM grossly intact, no scleral icterus, no pharyngeal erythema.   Neck:  No JVD, no bruits, trachea midline  CVS: RRR. Normal S1, S2. No M/R/G. No LE edema.  2+ radial pulses, 2+ PT pulses  Resp: CTAB. No wheezing or crackles/rhonchi. Normal respiratory effort.  Abdomen: Soft, NT, no lyly hepatomegaly.  MSK/Ext: No clubbing or cyanosis.  Skin: Warm and dry, no rashes.  Neurological: CN III-XII grossly intact. No focal deficits.   Psych: A&O x 3, appropriate affect, good judgement        Lab Review  Lab Results   Component Value Date/Time    WBC 6.3 09/23/2019 03:48 AM    RBC 4.49 (L) 09/23/2019 03:48 AM    HEMOGLOBIN 13.9 (L) 09/23/2019 03:48 AM    HEMATOCRIT 39.5 (L) 09/23/2019 03:48 AM    MCV 88.0 09/23/2019 03:48 AM    MCH 31.0 09/23/2019 03:48 AM    MCHC 35.2 09/23/2019 03:48 AM    MPV 10.4 09/23/2019 03:48 AM      Lab Results   Component Value Date/Time    SODIUM 140 09/23/2019 03:48 AM    POTASSIUM 3.8 09/23/2019 03:48 AM    CHLORIDE 108 09/23/2019 03:48 AM     CO2 25 09/23/2019 03:48 AM    GLUCOSE 118 (H) 09/23/2019 03:48 AM    BUN 13 09/23/2019 03:48 AM    CREATININE 0.91 09/23/2019 03:48 AM      Lab Results   Component Value Date/Time    ASTSGOT 27 09/20/2019 08:49 PM    ALTSGPT 25 09/20/2019 08:49 PM     Lab Results   Component Value Date/Time    TROPONINT 33 (H) 09/21/2019 05:36 AM       No results for input(s): NTPROBNP in the last 72 hours.    Cardiac Imaging and Procedures Review  EKG:  My personal interpretation of the EKG dated 9/21/2019 is sinus, rate 83, nonspecific T wave changes      Cardiac Catheterization 9/22/19:  ANGIOGRAM:  1.  Left main coronary artery:  Left main coronary artery is a long, very   large-caliber vessel free of disease.  2.  Left anterior descending artery:  Left anterior descending artery is a   long, very large-caliber vessel, which wraps around the apex.  Proximal   portion of the vessel, there is a concentric 40-50% stenosis.  Mid to distal   portion, there is an eccentric 40% stenosis.  There is a large-caliber   diagonal branch noted free of disease.  3.  Ramus intermedius:  Ramus intermedius is a short, small-caliber vessel   free of disease.  4.  Left circumflex artery:  Left circumflex artery is a nondominant, very   large-caliber vessel with large-caliber distal bifurcating obtuse marginal   branch and posterolateral branch.  Left circumflex artery and its branches are   free of disease.  5.  Right coronary artery:  Right coronary is a large-caliber vessel with mid   eccentric 40-50% stenosis.  Mid to distal portion of the vessel, there is a   second eccentric 40-50% stenosis.  Distal portion of the vessel, there is a   concentric 90% stenosis.  Beyond this, there is a moderate-caliber posterior   descending artery branch noted with luminal irregularities of 20-30%.        POSTPROCEDURE DIAGNOSES:  1.  Coronary artery disease with high-grade distal right coronary artery   stenosis, nonobstructive proximal left anterior  descending artery and mid to   distal left anterior descending artery stenosis.  2.  Successful percutaneous transluminal coronary angioplasty/stent placement   of the distal right coronary artery with 2.5x20 mm Synergy drug-eluting stent.  3.  Normal left ventricular systolic function with ejection fraction of 65%.  4.  Elevated left ventricular end-diastolic pressure.    Imaging  Chest X-Ray: 9/20/2019  No acute cardiopulmonary disease.    Stress Test: 9/21/2019   Normal left ventricular size, ejection fraction, and wall motion.   No reversible defects that would indicate ischemia.    Non-reversible defect noted could be due to artifact or infarct.    Assessment/Plan  -Chest pain, resolved  -Coronary artery disease, status post PCI with residual disease  -Dyslipidemia  -Palpitations  -Hypertension    Plan:  -Referral to cardiac rehabilitation, he may not be able to afford it  -Dual antiplatelet therapy 1 year  -Echocardiogram  -Aggressive modification for secondary prevention including optimal statin therapy  -Okay to discharge home tomorrow  -Check lipid panel  -Blood pressure control  -Meds to beds program    DW Dr. Pollock    Thank you for allowing me to participate in the care of this patient.  I will continue to follow this patient    Please contact me with any questions.    Kelsie Lu M.D.   Cardiologist, [facility]  [phone]

## 2019-09-24 NOTE — PROGRESS NOTES
Bedside report received from JULIANO Felder. Assumed care of pt. Pt awake, laying in bed. A/Ox4, VSS. No concerns, complaints or distress. Pt educated to call before getting out of bed. POC reviewed and white board updated. Tele box on. SR 91 on the monitor. Call light in reach. Bed locked in lowest position with 2 upper bed rails up. Bed alarm on.

## 2019-09-24 NOTE — CARE PLAN
Problem: Communication  Goal: The ability to communicate needs accurately and effectively will improve  Outcome: PROGRESSING AS EXPECTED     Problem: Safety  Goal: Will remain free from falls  Outcome: PROGRESSING AS EXPECTED     Problem: Infection  Goal: Will remain free from infection  Outcome: PROGRESSING AS EXPECTED

## 2019-09-24 NOTE — PROGRESS NOTES
Cardiology Follow Up Progress Note    Date of Service  9/24/2019    Attending Physician  Edi Lo D.O.    Chief Complaint   ACS     HPI  Beni Moore is a 49 y.o. male admitted 9/20/2019 with chest pain, palpitations, found to have an abnormal stress test, now status post PCI.  Troponin 35, 34, 33. BP elevated.  Has underlying debilitating GI/diarrhea syndrome.      Interim Events  Tele- sinus  Had heart cath, had one stent placed, had residual disease.  Initial presentation was chest tightness after palpitations.  No more chest pain, no more palpitations.  Blood pressure elevated at times, 169/108, currently 159/97  No bleeding on dual antiplatelet therapy.    Cr 0.91  hbg 13.9  Pl 241  LDL 64, HDL low at 33    Lives locally.  Not working due to mysterious GI illness which leads to excessive and frequent diarrhea.    Review of Systems  Review of Systems   Constitutional: Negative for chills and fever.   Gastrointestinal: Negative for nausea and vomiting.     Vital signs in last 24 hours  Temp:  [36.6 °C (97.9 °F)-37.2 °C (98.9 °F)] 37 °C (98.6 °F)  Pulse:  [82-92] 92  Resp:  [16-20] 18  BP: (138-171)/() 159/97  SpO2:  [94 %-98 %] 98 %    Physical Exam  Physical Exam       General: No acute distress. Well nourished.  HEENT: EOM grossly intact, no scleral icterus, no pharyngeal erythema.   Neck:  No JVD, no bruits, trachea midline  CVS: RRR. Normal S1, S2. No M/R/G. No LE edema.  2+ radial pulses, 2+ PT pulses  Resp: CTAB. No wheezing or crackles/rhonchi. Normal respiratory effort.  Abdomen: Soft, NT, no lyly hepatomegaly.  MSK/Ext: No clubbing or cyanosis.  Skin: Warm and dry, no rashes.  Neurological: CN III-XII grossly intact. No focal deficits.   Psych: A&O x 3, appropriate affect, good judgement    Physical exam performed today and unchanged compared to 9-23-19.       Lab Review  Lab Results   Component Value Date/Time    WBC 6.3 09/23/2019 03:48 AM    RBC 4.49 (L) 09/23/2019 03:48 AM     HEMOGLOBIN 13.9 (L) 09/23/2019 03:48 AM    HEMATOCRIT 39.5 (L) 09/23/2019 03:48 AM    MCV 88.0 09/23/2019 03:48 AM    MCH 31.0 09/23/2019 03:48 AM    MCHC 35.2 09/23/2019 03:48 AM    MPV 10.4 09/23/2019 03:48 AM      Lab Results   Component Value Date/Time    SODIUM 140 09/23/2019 03:48 AM    POTASSIUM 3.8 09/23/2019 03:48 AM    CHLORIDE 108 09/23/2019 03:48 AM    CO2 25 09/23/2019 03:48 AM    GLUCOSE 118 (H) 09/23/2019 03:48 AM    BUN 13 09/23/2019 03:48 AM    CREATININE 0.91 09/23/2019 03:48 AM      Lab Results   Component Value Date/Time    ASTSGOT 27 09/20/2019 08:49 PM    ALTSGPT 25 09/20/2019 08:49 PM     Lab Results   Component Value Date/Time    CHOLSTRLTOT 111 09/24/2019 02:38 AM    LDL 64 09/24/2019 02:38 AM    HDL 33 (A) 09/24/2019 02:38 AM    TRIGLYCERIDE 71 09/24/2019 02:38 AM    TROPONINT 33 (H) 09/21/2019 05:36 AM       No results for input(s): NTPROBNP in the last 72 hours.    Cardiac Imaging and Procedures Review  EKG:  My personal interpretation of the EKG dated 9/21/2019 is sinus, rate 83, nonspecific T wave changes      Cardiac Catheterization 9/22/19:  ANGIOGRAM:  1.  Left main coronary artery:  Left main coronary artery is a long, very   large-caliber vessel free of disease.  2.  Left anterior descending artery:  Left anterior descending artery is a   long, very large-caliber vessel, which wraps around the apex.  Proximal   portion of the vessel, there is a concentric 40-50% stenosis.  Mid to distal   portion, there is an eccentric 40% stenosis.  There is a large-caliber   diagonal branch noted free of disease.  3.  Ramus intermedius:  Ramus intermedius is a short, small-caliber vessel   free of disease.  4.  Left circumflex artery:  Left circumflex artery is a nondominant, very   large-caliber vessel with large-caliber distal bifurcating obtuse marginal   branch and posterolateral branch.  Left circumflex artery and its branches are   free of disease.  5.  Right coronary artery:  Right  coronary is a large-caliber vessel with mid   eccentric 40-50% stenosis.  Mid to distal portion of the vessel, there is a   second eccentric 40-50% stenosis.  Distal portion of the vessel, there is a   concentric 90% stenosis.  Beyond this, there is a moderate-caliber posterior   descending artery branch noted with luminal irregularities of 20-30%.        POSTPROCEDURE DIAGNOSES:  1.  Coronary artery disease with high-grade distal right coronary artery   stenosis, nonobstructive proximal left anterior descending artery and mid to   distal left anterior descending artery stenosis.  2.  Successful percutaneous transluminal coronary angioplasty/stent placement   of the distal right coronary artery with 2.5x20 mm Synergy drug-eluting stent.  3.  Normal left ventricular systolic function with ejection fraction of 65%.  4.  Elevated left ventricular end-diastolic pressure.    Imaging  Chest X-Ray: 9/20/2019  No acute cardiopulmonary disease.    Stress Test: 9/21/2019   Normal left ventricular size, ejection fraction, and wall motion.   No reversible defects that would indicate ischemia.    Non-reversible defect noted could be due to artifact or infarct.    Assessment/Plan  -Chest pain, resolved  -Coronary artery disease, status post PCI with residual disease  -Dyslipidemia, low HDL  -Palpitations  -Hypertension, not controlled  -Unknown GI illness with excessive diarrhea    Plan:  -Referral to cardiac rehabilitation, he may not be able to afford it  -Dual antiplatelet therapy 1 year  -Home tomorrow after better blood pressure control  -Aggressive modification for secondary prevention including optimal statin therapy  -Okay to discharge home tomorrow  -Meds to beds program, change metoprolol to carvedilol 25 twice daily  -Add hydrochlorothiazide for tomorrow  -On amlodipine also  -Needs basic metabolic panel 1 week after DC    DW Dr. Wilde    Thank you for allowing me to participate in the care of this patient.    I will  continue to follow this patient    Please contact me with any questions.    Kelsie Lu M.D.   Cardiologist, [facility]  [phone]    Future Appointments   Date Time Provider Department Center   9/26/2019  8:20 AM [facility] [address]   10/2/2019  8:00 AM Yudi Crockett M.D. [address]   10/10/2019  8:20 AM Martha Rubio P.A.-C. [facility] None   11/18/2019 11:20 AM Marita Overton M.D. [facility] None

## 2019-09-24 NOTE — CARE PLAN
Problem: Communication  Goal: The ability to communicate needs accurately and effectively will improve  Patient educated on medications, procedures and use of call light. Patient will continue to verbalize and improve on education and communication in the plan of care.  Outcome: PROGRESSING AS EXPECTED     Problem: Safety  Goal: Will remain free from falls  Outcome: PROGRESSING AS EXPECTED   Educated patient on use of call light, no slip socks on, bed lowest position. All needs attended to. Patient verbalized understanding.

## 2019-09-24 NOTE — PROGRESS NOTES
Cardiology Follow Up Progress Note    Date of Service  9/23/2019    Attending Physician  Edi Lo D.O.    Chief Complaint   Chest pain    HPI  Beni Moore is a 49 y.o. male admitted 9/20/2019 with chest pain and palpitations. Patient had recently had a normal MPI on 9/21/19, it was decided to proceed with cardiac catheterization which showed 90% distal RCA occlusion, patient underwent successful PCI with ARTURO.     Past medical history significant for type 2 diabetes and renal insufficiency.    Interim Events  9/23/19:    Monitor:     Review of Systems  Review of Systems   Constitutional: Negative for fever.   Respiratory: Negative for chest tightness and shortness of breath.    Cardiovascular: Negative for chest pain, palpitations and leg swelling.   Gastrointestinal: Negative for abdominal pain and blood in stool.   Genitourinary: Negative for hematuria.   Musculoskeletal: Negative for gait problem.   Neurological: Negative for dizziness and syncope.   All other systems reviewed and are negative.      Vital signs in last 24 hours  Temp:  [36.3 °C (97.4 °F)-37 °C (98.6 °F)] 36.7 °C (98.1 °F)  Pulse:  [81-87] 85  Resp:  [12-18] 12  BP: (158-179)/(101-114) 163/104  SpO2:  [93 %-97 %] 97 %    Physical Exam  Physical Exam   Constitutional: He is oriented to person, place, and time. He appears well-developed and well-nourished.   HENT:   Head: Normocephalic.   Eyes: EOM are normal.   Neck: No JVD present.   Cardiovascular: Normal rate, regular rhythm, normal heart sounds and intact distal pulses.   Pulmonary/Chest: Effort normal and breath sounds normal.   Abdominal: Soft. There is no tenderness.   Musculoskeletal: Normal range of motion. He exhibits no edema.   Neurological: He is alert and oriented to person, place, and time.   Skin: Skin is warm and dry.   Psychiatric: He has a normal mood and affect. His behavior is normal. Thought content normal.   Nursing note and vitals reviewed.      Lab Review  Lab  Results   Component Value Date/Time    WBC 6.3 09/23/2019 03:48 AM    RBC 4.49 (L) 09/23/2019 03:48 AM    HEMOGLOBIN 13.9 (L) 09/23/2019 03:48 AM    HEMATOCRIT 39.5 (L) 09/23/2019 03:48 AM    MCV 88.0 09/23/2019 03:48 AM    MCH 31.0 09/23/2019 03:48 AM    MCHC 35.2 09/23/2019 03:48 AM    MPV 10.4 09/23/2019 03:48 AM      Lab Results   Component Value Date/Time    SODIUM 140 09/23/2019 03:48 AM    POTASSIUM 3.8 09/23/2019 03:48 AM    CHLORIDE 108 09/23/2019 03:48 AM    CO2 25 09/23/2019 03:48 AM    GLUCOSE 118 (H) 09/23/2019 03:48 AM    BUN 13 09/23/2019 03:48 AM    CREATININE 0.91 09/23/2019 03:48 AM      Lab Results   Component Value Date/Time    ASTSGOT 27 09/20/2019 08:49 PM    ALTSGPT 25 09/20/2019 08:49 PM     Lab Results   Component Value Date/Time    TROPONINT 33 (H) 09/21/2019 05:36 AM       No results for input(s): NTPROBNP in the last 72 hours.    Cardiac Imaging and Procedures Review  Cardiac Catheterization 9/22/19:  ANGIOGRAM:  1.  Left main coronary artery:  Left main coronary artery is a long, very   large-caliber vessel free of disease.  2.  Left anterior descending artery:  Left anterior descending artery is a   long, very large-caliber vessel, which wraps around the apex.  Proximal   portion of the vessel, there is a concentric 40-50% stenosis.  Mid to distal   portion, there is an eccentric 40% stenosis.  There is a large-caliber   diagonal branch noted free of disease.  3.  Ramus intermedius:  Ramus intermedius is a short, small-caliber vessel   free of disease.  4.  Left circumflex artery:  Left circumflex artery is a nondominant, very   large-caliber vessel with large-caliber distal bifurcating obtuse marginal   branch and posterolateral branch.  Left circumflex artery and its branches are   free of disease.  5.  Right coronary artery:  Right coronary is a large-caliber vessel with mid   eccentric 40-50% stenosis.  Mid to distal portion of the vessel, there is a   second eccentric 40-50%  stenosis.  Distal portion of the vessel, there is a   concentric 90% stenosis.  Beyond this, there is a moderate-caliber posterior   descending artery branch noted with luminal irregularities of 20-30%.      POSTPROCEDURE DIAGNOSES:  1.  Coronary artery disease with high-grade distal right coronary artery   stenosis, nonobstructive proximal left anterior descending artery and mid to   distal left anterior descending artery stenosis.  2.  Successful percutaneous transluminal coronary angioplasty/stent placement   of the distal right coronary artery with 2.5x20 mm Synergy drug-eluting stent.  3.  Normal left ventricular systolic function with ejection fraction of 65%.  4.  Elevated left ventricular end-diastolic pressure.        Assessment/Plan  Chest Pain:  -S/P successful PCI with ARTURO to the RCA, residual non-obstructive disease observed in the LAD.   -Repeat echo ordered and pending.   -DAPT (ASA and Brilinta) meds to bed requested.     Thank you for allowing us to participate in the care of this patient.        SIERRA Justice.   Cardiologist, [facility]  [phone]

## 2019-09-24 NOTE — PROGRESS NOTES
Hospital Medicine Daily Progress Note    Date of Service: 9/23/2019 Code Status: Full Code     Chief Complaint  Chief Complaint   Patient presents with   • Chest Pain       Consultants/Specialty: Cardiology, Interventional Cardiology Disposition: Roll Patient to IP for stenting in Cath Lab     Hospital Course     ER and Admission Day course  9/21:  This 48 yo male with known IDDM, prior right TMA from  7/2018, chronic diarrhea for past year, diabetic retinopathy, + family history of early CAD in paternal grandfather presented with dull right sternal chest pain while at rest watching tv last night.  He states the pain lasted 15 seconds, but he had an abnormal sensation that radiated to his right neck, palpitations and shortness of breath that lasted about an hour.  He states he has been having extreme fatigue lately with ADL.  He recently went antelope hunting with his father and felt he was having more SOB and exercise intolerance than his baseline.  No smoking or alcohol use, lives with wife.  No prior known CAD or MI.  NM stress with no reversible ischemia seen, but moderate inferior area suspicious for prior infarct.  D-dimer mildly elevated at 0.54.  No recent surgeries or leg edema/pain.  Cr 1.4, IVFs started.  mildly elevated blood pressure.  Started on metoprolol 25mg bid, asa since admission, EKG reviewed with artifact seen inferior leads, repeat EKG ordered. trops 33.  CXR negative.  Consulted Dr. Engel regarding above concerns of fatigue, risk factors and defect seen on NM study.  9/22 -  Cath Lab with 1 stent placed  9/23 - Blood pressure elevated,blood pressure needs further stabilization. Concern 50% occlusion of Prox LAD will cause him to have resistant hypertension.  Hypertension currently resistent       Interval Problem Update  Patient was seen and evaluated today for Chest Pressure      Review of Systems  Review of Systems   Constitutional: Positive for malaise/fatigue. Negative for fever.    Respiratory: Negative for cough and shortness of breath.    Cardiovascular: Negative for chest pain and leg swelling.   Gastrointestinal: Negative for abdominal pain and diarrhea.   Skin: Negative for itching and rash.   Psychiatric/Behavioral: Negative for depression. The patient is not nervous/anxious.     Physical Exam  Physical Exam   Constitutional: No distress.   Cardiovascular: Normal rate and regular rhythm.   Pulmonary/Chest: No respiratory distress. He has no wheezes.   Abdominal: He exhibits no distension. There is no tenderness.   Neurological: He is alert. Coordination normal.   Skin: Skin is warm and dry. He is not diaphoretic. No erythema.   Nursing note and vitals reviewed.       I/Os  No intake or output data in the 24 hours ending 09/23/19 2139 Vital Signs  Temp:  [36.4 °C (97.5 °F)-37.2 °C (98.9 °F)] 37.2 °C (98.9 °F)  Pulse:  [83-89] 89  Resp:  [12-18] 18  BP: (156-169)/() 156/91  SpO2:  [93 %-98 %] 97 %     Laboratory  Results from last 7 days   Lab Units 09/23/19  0348 09/22/19  0405   WBC 1501 K/uL 6.3 7.4   HGB 1503 g/dL 13.9* 14.2   HCT 1504 % 39.5* 40.4*   PLATELET COUNT 1518 K/uL 241 263     Results from last 7 days   Lab Units 09/23/19  0348 09/22/19  0405   SODIUM 101 mmol/L 140 139   POTASSIUM 102 mmol/L 3.8 3.9   CHLORIDE 103 mmol/L 108 109   CO2 104 mmol/L 25 22   ANION GAP ANION  7.0 8.0    mg/dL 13 18   CREATININE 109 mg/dL 0.91 1.04   CALCIUM 105 mg/dL 8.9 8.7   GLUCOSE 112 mg/dL 118* 143*   IF ADRIAN AMER [id number] mL/min/1.73 m 2 >60 >60   IF NON AFRICAN AMER 109GFRB mL/min/1.73 m 2 >60 >60    Imaging  NM-CARDIAC STRESS TEST   Final Result      DX-CHEST-PORTABLE (1 VIEW)   Final Result         1.  No acute cardiopulmonary disease.      CL-LEFT HEART CATHETERIZATION WITH POSSIBLE INTERVENTION    (Results Pending)   EC-ECHOCARDIOGRAM COMPLETE W/O CONT    (Results Pending)         Medications    Scheduled medications:  atorvastatin 80 mg Oral QHS   aspirin EC 81 mg Oral  "DAILY   ticagrelor 90 mg Oral BID   lisinopril 40 mg Oral DAILY   omeprazole 20 mg Oral DAILY   Pharmacy Consult Request 1 Each Other PHARMACY TO DOSE   senna-docusate 2 Tab Oral BID   insulin regular 1-6 Units Subcutaneous Q6HRS   metoprolol 25 mg Oral TWICE DAILY           Medications were reviewed today.09/22/19        Assessment/Plan  * Chest pain- (present on admission)  Assessment & Plan  9/23 -Received \"1.  Distal right coronary artery concentric 90% stenosis with 0% residual.  LULÚ-3 flow pre and post procedure. Predilatation with 2.5x50 mm TREK balloon.  Stent with 2.5x20 mm Synergy drug-eluting stent.  Postdilatation with 2.5x50 mm NC Emerge balloon.\" 50% occlusion proximal LAD.  May be contributing symptoms.  9/22- Cath this AM.  9/21 - adm - Extreme fatigue with ADL noted for past few months. Episode of dull chest pressure/pain occurred at rest. No prior CAD.  NM with inferior defect seen possible infarct. Risk factors:  IDDM, family history, HTN. Cardiology consulted for possible cath in a.m.  Results from last 7 days   Lab Units 09/21/19  0536 09/20/19  2253 09/20/19 2049   TROPONIN T [id number]  ng/L 33* 34* 35*           Chronic fatigue- (present on admission)  Assessment & Plan  9/22-9/23 - normal  9/21- adm - Check TSH with free T4.  Recent Labs     09/22/19  0405   TSHULTRASEN 2.430         AXEL (acute kidney injury) (HCC)- (present on admission)  Assessment & Plan  9/22-9/23 - resolved.  9/21- adm - Cr 1.4 on admission. Last Cr normal 7/2018. Started IVFs /hr. Repeat bmp in a.m.  Results from last 7 days   Lab Units 09/23/19  0348 09/22/19  0405 09/20/19  2049   POTASSIUM 102 mmol/L 3.8 3.9 4.2   CREATININE 109 mg/dL 0.91 1.04 1.41*   IF ADRIAN AMER 034008 mL/min/1.73 m 2 >60 >60 >60   IF NON AFRICAN AMER 109GFRB mL/min/1.73 m 2 >60 >60 53*            Essential hypertension- (present on admission)  Assessment & Plan  9/23 - persistently elevated, giving 10 iv labetalol, likely needs higher dose " metoprolol.  Already on lisinopril 40mg  9/22 - Metoprolol would not seem to have had any effect.   9/21- adm - Elevated BP since admission. Added metoprolol 25 bid to home lisinopril.    Type 2 diabetes mellitus with complication, with long-term current use of insulin (HCC)- (present on admission)  Assessment & Plan  9/22-9/23 - Stable  9/21 - adm - correction dosing as needed. dc'd lantus since NPO at [facility] for cath in a.m.  Results from last 7 days   Lab Units 09/23/19  0741 09/22/19  2229 09/22/19  1415 09/22/19  0536 09/22/19  0021 09/21/19  0626 09/21/19  0304 09/21/19  0203   ACCU CHECK GLUCOSE 788 mg/dL 157* 136* 99 147* 114* 117* 87 78           S/P transmetatarsal amputation of foot, right (HCC)- (present on admission)  Assessment & Plan  9/22-9/23 - Stable  9/21 - adm - Stable.       VTE prophylaxis: scds

## 2019-09-24 NOTE — PROGRESS NOTES
Received bedside report update from Veronica Rangel. Patient in bed with wife at bedside. No complaints of pain at this time.

## 2019-09-25 DIAGNOSIS — I25.10 ASCVD (ARTERIOSCLEROTIC CARDIOVASCULAR DISEASE): ICD-10-CM

## 2019-09-25 PROBLEM — I21.4 NSTEMI (NON-ST ELEVATED MYOCARDIAL INFARCTION) (HCC): Status: ACTIVE | Noted: 2019-09-21

## 2019-09-25 LAB
ALBUMIN SERPL BCP-MCNC: 3.9 G/DL (ref 3.2–4.9)
ALBUMIN/GLOB SERPL: 1.5 G/DL
ALP SERPL-CCNC: 40 U/L (ref 30–99)
ALT SERPL-CCNC: 18 U/L (ref 2–50)
ANION GAP SERPL CALC-SCNC: 7 MMOL/L (ref 0–11.9)
AST SERPL-CCNC: 15 U/L (ref 12–45)
BASOPHILS # BLD AUTO: 0.3 % (ref 0–1.8)
BASOPHILS # BLD: 0.03 K/UL (ref 0–0.12)
BILIRUB SERPL-MCNC: 1.3 MG/DL (ref 0.1–1.5)
BUN SERPL-MCNC: 19 MG/DL (ref 8–22)
CALCIUM SERPL-MCNC: 9.3 MG/DL (ref 8.5–10.5)
CHLORIDE SERPL-SCNC: 106 MMOL/L (ref 96–112)
CO2 SERPL-SCNC: 24 MMOL/L (ref 20–33)
CREAT SERPL-MCNC: 1.27 MG/DL (ref 0.5–1.4)
EKG IMPRESSION: NORMAL
EOSINOPHIL # BLD AUTO: 0.08 K/UL (ref 0–0.51)
EOSINOPHIL NFR BLD: 0.9 % (ref 0–6.9)
ERYTHROCYTE [DISTWIDTH] IN BLOOD BY AUTOMATED COUNT: 40.4 FL (ref 35.9–50)
GLOBULIN SER CALC-MCNC: 2.6 G/DL (ref 1.9–3.5)
GLUCOSE BLD-MCNC: 147 MG/DL (ref 65–99)
GLUCOSE BLD-MCNC: 178 MG/DL (ref 65–99)
GLUCOSE BLD-MCNC: 197 MG/DL (ref 65–99)
GLUCOSE BLD-MCNC: 231 MG/DL (ref 65–99)
GLUCOSE SERPL-MCNC: 212 MG/DL (ref 65–99)
HCT VFR BLD AUTO: 39.6 % (ref 42–52)
HGB BLD-MCNC: 13.9 G/DL (ref 14–18)
IMM GRANULOCYTES # BLD AUTO: 0.04 K/UL (ref 0–0.11)
IMM GRANULOCYTES NFR BLD AUTO: 0.4 % (ref 0–0.9)
LYMPHOCYTES # BLD AUTO: 1.77 K/UL (ref 1–4.8)
LYMPHOCYTES NFR BLD: 19.7 % (ref 22–41)
MCH RBC QN AUTO: 30.8 PG (ref 27–33)
MCHC RBC AUTO-ENTMCNC: 35.1 G/DL (ref 33.7–35.3)
MCV RBC AUTO: 87.8 FL (ref 81.4–97.8)
MONOCYTES # BLD AUTO: 0.57 K/UL (ref 0–0.85)
MONOCYTES NFR BLD AUTO: 6.3 % (ref 0–13.4)
NEUTROPHILS # BLD AUTO: 6.51 K/UL (ref 1.82–7.42)
NEUTROPHILS NFR BLD: 72.4 % (ref 44–72)
NRBC # BLD AUTO: 0 K/UL
NRBC BLD-RTO: 0 /100 WBC
PLATELET # BLD AUTO: 267 K/UL (ref 164–446)
PMV BLD AUTO: 10.2 FL (ref 9–12.9)
POTASSIUM SERPL-SCNC: 4.7 MMOL/L (ref 3.6–5.5)
PROT SERPL-MCNC: 6.5 G/DL (ref 6–8.2)
RBC # BLD AUTO: 4.51 M/UL (ref 4.7–6.1)
SODIUM SERPL-SCNC: 137 MMOL/L (ref 135–145)
TROPONIN T SERPL-MCNC: 39 NG/L (ref 6–19)
TROPONIN T SERPL-MCNC: 45 NG/L (ref 6–19)
WBC # BLD AUTO: 9 K/UL (ref 4.8–10.8)

## 2019-09-25 PROCEDURE — 700102 HCHG RX REV CODE 250 W/ 637 OVERRIDE(OP): Performed by: INTERNAL MEDICINE

## 2019-09-25 PROCEDURE — 80053 COMPREHEN METABOLIC PANEL: CPT

## 2019-09-25 PROCEDURE — A9270 NON-COVERED ITEM OR SERVICE: HCPCS | Performed by: INTERNAL MEDICINE

## 2019-09-25 PROCEDURE — 99233 SBSQ HOSP IP/OBS HIGH 50: CPT | Performed by: INTERNAL MEDICINE

## 2019-09-25 PROCEDURE — 82962 GLUCOSE BLOOD TEST: CPT | Mod: 91

## 2019-09-25 PROCEDURE — 700105 HCHG RX REV CODE 258: Performed by: INTERNAL MEDICINE

## 2019-09-25 PROCEDURE — 99232 SBSQ HOSP IP/OBS MODERATE 35: CPT | Performed by: INTERNAL MEDICINE

## 2019-09-25 PROCEDURE — 36415 COLL VENOUS BLD VENIPUNCTURE: CPT

## 2019-09-25 PROCEDURE — A9270 NON-COVERED ITEM OR SERVICE: HCPCS | Performed by: HOSPITALIST

## 2019-09-25 PROCEDURE — 85025 COMPLETE CBC W/AUTO DIFF WBC: CPT

## 2019-09-25 PROCEDURE — 700102 HCHG RX REV CODE 250 W/ 637 OVERRIDE(OP): Performed by: HOSPITALIST

## 2019-09-25 PROCEDURE — 770020 HCHG ROOM/CARE - TELE (206)

## 2019-09-25 PROCEDURE — 93010 ELECTROCARDIOGRAM REPORT: CPT | Performed by: INTERNAL MEDICINE

## 2019-09-25 PROCEDURE — 97162 PT EVAL MOD COMPLEX 30 MIN: CPT

## 2019-09-25 PROCEDURE — 84484 ASSAY OF TROPONIN QUANT: CPT

## 2019-09-25 RX ORDER — AMLODIPINE BESYLATE 5 MG/1
5 TABLET ORAL
Status: DISCONTINUED | OUTPATIENT
Start: 2019-09-26 | End: 2019-09-26

## 2019-09-25 RX ORDER — SODIUM CHLORIDE 9 MG/ML
1000 INJECTION, SOLUTION INTRAVENOUS ONCE
Status: COMPLETED | OUTPATIENT
Start: 2019-09-25 | End: 2019-09-25

## 2019-09-25 RX ORDER — CARVEDILOL 12.5 MG/1
12.5 TABLET ORAL 2 TIMES DAILY WITH MEALS
Status: DISCONTINUED | OUTPATIENT
Start: 2019-09-25 | End: 2019-09-26

## 2019-09-25 RX ORDER — CARVEDILOL 12.5 MG/1
12.5 TABLET ORAL 2 TIMES DAILY WITH MEALS
Status: DISCONTINUED | OUTPATIENT
Start: 2019-09-25 | End: 2019-09-25

## 2019-09-25 RX ADMIN — CARVEDILOL 12.5 MG: 12.5 TABLET, FILM COATED ORAL at 09:19

## 2019-09-25 RX ADMIN — HYDROCHLOROTHIAZIDE 25 MG: 25 TABLET ORAL at 05:26

## 2019-09-25 RX ADMIN — TICAGRELOR 90 MG: 90 TABLET ORAL at 09:19

## 2019-09-25 RX ADMIN — AMLODIPINE BESYLATE 10 MG: 10 TABLET ORAL at 05:26

## 2019-09-25 RX ADMIN — LISINOPRIL 40 MG: 20 TABLET ORAL at 05:25

## 2019-09-25 RX ADMIN — INSULIN HUMAN 2 UNITS: 100 INJECTION, SOLUTION PARENTERAL at 00:47

## 2019-09-25 RX ADMIN — SODIUM CHLORIDE 1000 ML: 9 INJECTION, SOLUTION INTRAVENOUS at 17:11

## 2019-09-25 RX ADMIN — INSULIN HUMAN 1 UNITS: 100 INJECTION, SOLUTION PARENTERAL at 17:22

## 2019-09-25 RX ADMIN — CARVEDILOL 12.5 MG: 12.5 TABLET, FILM COATED ORAL at 17:11

## 2019-09-25 RX ADMIN — INSULIN HUMAN 1 UNITS: 100 INJECTION, SOLUTION PARENTERAL at 12:20

## 2019-09-25 RX ADMIN — ATORVASTATIN CALCIUM 80 MG: 80 TABLET, FILM COATED ORAL at 21:04

## 2019-09-25 RX ADMIN — ASPIRIN 81 MG: 81 TABLET, COATED ORAL at 05:26

## 2019-09-25 RX ADMIN — ATORVASTATIN CALCIUM 80 MG: 80 TABLET, FILM COATED ORAL at 00:47

## 2019-09-25 RX ADMIN — OMEPRAZOLE 20 MG: 20 CAPSULE, DELAYED RELEASE ORAL at 05:26

## 2019-09-25 RX ADMIN — TICAGRELOR 90 MG: 90 TABLET ORAL at 17:11

## 2019-09-25 ASSESSMENT — ENCOUNTER SYMPTOMS
CHEST TIGHTNESS: 0
NUMBNESS: 0
NERVOUS/ANXIOUS: 0
CONFUSION: 0
COUGH: 0
DIAPHORESIS: 0
VOMITING: 0
TROUBLE SWALLOWING: 0
AGITATION: 0
DOUBLE VISION: 0
HEADACHES: 0
FEVER: 0
BLURRED VISION: 0
DIARRHEA: 0
MYALGIAS: 0
PALPITATIONS: 0
DIZZINESS: 0
CONSTIPATION: 0
COLOR CHANGE: 0
CHILLS: 0
ABDOMINAL DISTENTION: 0
HEARTBURN: 0
SHORTNESS OF BREATH: 0
NAUSEA: 0
BLOOD IN STOOL: 0
DIZZINESS: 1
ABDOMINAL PAIN: 0

## 2019-09-25 ASSESSMENT — COGNITIVE AND FUNCTIONAL STATUS - GENERAL
SUGGESTED CMS G CODE MODIFIER MOBILITY: CH
MOBILITY SCORE: 24

## 2019-09-25 ASSESSMENT — GAIT ASSESSMENTS: GAIT LEVEL OF ASSIST: UNABLE TO PARTICIPATE

## 2019-09-25 NOTE — DISCHARGE PLANNING
[facility]    MEDS TO BEDS     DELIVERED TO PATIENT IN ROOM  He reports his wife will be here soon and she will take the medications home with her.       Brilinta 90mg #60 provided at $0 copay.     Pt education provided, all questions answered, including but not limited to potential side effects, what to do if a dose is missed, when to contact his physician.  Pt verbalized understanding to take this medication twice daily along with an 81mg ASA, with or without regard to food,  for a duration of 12 months unless otherwise directed by cardiology.  Pt understands to begin this medication AFTER discharge from hospital. Pt verbalized understanding that he must refill this medication at his local drug outlet.     All questions answered.  Pt verbalized understanding including when to return to the ED.  Uriah Flanagan, PharmD

## 2019-09-25 NOTE — CARE PLAN
Problem: Safety  Goal: Will remain free from injury  Outcome: PROGRESSING AS EXPECTED  Goal: Will remain free from falls  Outcome: PROGRESSING AS EXPECTED  Intervention: Implement fall precautions  Flowsheets (Taken 9/25/2019 0800)  Environmental Precautions: Treaded Slipper Socks on Patient;Personal Belongings, Wastebasket, Call Bell etc. in Easy Reach;Bed in Low Position  Note:   Pt remains free from falls at this time. Safety precautions in place. Pt educated on calling for assistance prior to mobilization.        Problem: Knowledge Deficit  Goal: Knowledge of disease process/condition, treatment plan, diagnostic tests, and medications will improve  Outcome: PROGRESSING AS EXPECTED  Note:   Pt updated on POC, tests, and medications. Pt verbalizes understanding and has no further questions at this time. Pt educated on calling for any more questions.

## 2019-09-25 NOTE — PROGRESS NOTES
Bedside report received from nurse. Assumed care of pt. Pt resting comfortably in bed. A/Ox4, VSS, s All needs met. POC reviewed and white board updated. Tele box on. Call light in reach. Bed locked in lowest position with 2 upper bed rails up. No pain or complaints

## 2019-09-25 NOTE — DISCHARGE PLANNING
[facility]    Received order for Meds to Beds for [facility].  Prescription is currently being processed.  Will deliver medication once it has been processed.    Uriah Flanagan, PharmD

## 2019-09-25 NOTE — PROGRESS NOTES
Lactic resulted at 1.4, troponin at 48. PT is resting, on 2L O2 for comfort. Dr. Stanton updated, no further orders. Hourly rounding in place.

## 2019-09-25 NOTE — PROGRESS NOTES
EKG resulted and unchanged. PT on bedside commode with diarrhea. PT states that he does not feel well. Dr. Stanton updated and lactic and troponin ordered. Pt returned to bed with assistance. Repeat vitals taken and PT remains stable.

## 2019-09-25 NOTE — PROGRESS NOTES
"PT pulled bathroom cord after ambulating to the bathroom. PT states,\" I blacked out and woke up.\" Vitals taken and stable, blood sugar 178. Pt is diaphoretic. MD paged, stat EKG ordered. PT returned to bed from the bathroom via wheelchair with 2 RN's.  "

## 2019-09-25 NOTE — THERAPY
"Pt is a 48 y/o male admitted for chest pain and SOB. Pt underwent cardiac cath with succcess stent placement. Pt seen for PT cardiac rehab phase I, very receptive to PT education regarding activity and home walking program as pt reports he has a hunting trip scheduled for 2 weeks. Initiated mobility, seated BP 71/45 (pt asymptomatic), sitting x2 min /77, upon standing at EOB BP 80/53 (pt symptomatic after 1 min standing), returned to sitting 106/70. Further mobility deferred due to (+) orthostatic hypotension and RN notified of findings. Pt will continue to follow to complete full cardiac rehab phase I education and evaluation. Pt left seated in bedside chair, encouraged to reattempt ambulation with RN later today.     Physical Therapy Evaluation completed.   Bed Mobility:  Supine to Sit: Supervised  Transfers: Sit to Stand: Supervised  Gait: Level Of Assist: Unable to Participate - due to orthostatic hypotension       Plan of Care: Will benefit from Physical Therapy 4 times per week  Discharge Recommendations: Equipment: Will Continue to Assess for Equipment Needs.   Post-acute therapy: Recommend outpatient Cardiac Rehab phase II     See \"Rehab Therapy-Acute\" Patient Summary Report for complete documentation.     "

## 2019-09-25 NOTE — PROGRESS NOTES
Hospital Medicine Daily Progress Note    Date of Service  9/24/2019    Chief Complaint  49 y.o. male admitted 9/20/2019 with chest pain     Hospital Course    49-year-old male past medical history of insulin-dependent diabetes mellitus, prior right TMA, diabetic retinopathy, coronary artery disease status post stent placement presented to with chest pain.  Cardiac catheterization done 9/22/2019 showed , nonobstructive proximal left anterior descending artery and mid to   distal left anterior descending artery stenosis, PCI placement. He was started on brillinta/asa. EF 65%. BB also added. BP not well controled during this hospital stay and medication titrated.        Interval Problem Update  Eager to go home. BP still not well controled. Continue to monitor     Consultants/Specialty  Cardiology     Code Status  Full code     Disposition  Inpatient     Review of Systems  Review of Systems   Constitutional: Negative for chills and fever.   Eyes: Negative for blurred vision and double vision.   Respiratory: Negative for cough.    Cardiovascular: Negative for chest pain and leg swelling.   Gastrointestinal: Negative for abdominal pain, constipation, diarrhea, heartburn, nausea and vomiting.   Genitourinary: Negative for dysuria.   Musculoskeletal: Negative for myalgias.   Neurological: Negative for dizziness and headaches.        Physical Exam  Temp:  [36.1 °C (97 °F)-37 °C (98.6 °F)] 36.3 °C (97.3 °F)  Pulse:  [76-92] 83  Resp:  [18] 18  BP: (138-171)/() 145/97  SpO2:  [97 %-98 %] 97 %    Physical Exam   Constitutional: He is oriented to person, place, and time. He appears well-developed and well-nourished. No distress.   HENT:   Head: Normocephalic.   Cardiovascular: Normal rate and regular rhythm.   No murmur heard.  Pulmonary/Chest: Effort normal and breath sounds normal. No respiratory distress. He has no wheezes.   Abdominal: Soft. Bowel sounds are normal. He exhibits no distension.   Musculoskeletal: Normal  "range of motion. He exhibits no edema.   Neurological: He is alert and oriented to person, place, and time.   Skin: Skin is warm.   Psychiatric: He has a normal mood and affect. His behavior is normal.   Nursing note and vitals reviewed.      Fluids    Intake/Output Summary (Last 24 hours) at 9/24/2019 2028  Last data filed at 9/24/2019 0900  Gross per 24 hour   Intake 340 ml   Output --   Net 340 ml       Laboratory  Recent Labs     09/22/19 0405 09/23/19  0348   WBC 7.4 6.3   RBC 4.58* 4.49*   HEMOGLOBIN 14.2 13.9*   HEMATOCRIT 40.4* 39.5*   MCV 88.2 88.0   MCH 31.0 31.0   MCHC 35.1 35.2   RDW 40.9 40.4   PLATELETCT 263 241   MPV 10.4 10.4     Recent Labs     09/22/19  0405 09/23/19 0348   SODIUM 139 140   POTASSIUM 3.9 3.8   CHLORIDE 109 108   CO2 22 25   GLUCOSE 143* 118*   BUN 18 13   CREATININE 1.04 0.91   CALCIUM 8.7 8.9     Recent Labs     09/22/19 0405   APTT 29.8   INR 0.92         Recent Labs     09/24/19  0238   TRIGLYCERIDE 71   HDL 33*   LDL 64       Imaging  EC-ECHOCARDIOGRAM COMPLETE W/O CONT   Final Result      NM-CARDIAC STRESS TEST   Final Result      DX-CHEST-PORTABLE (1 VIEW)   Final Result         1.  No acute cardiopulmonary disease.      CL-LEFT HEART CATHETERIZATION WITH POSSIBLE INTERVENTION    (Results Pending)        Assessment/Plan  * Chest pain- (present on admission)  Assessment & Plan  9/24- possible due to hypertension. Cardiology following . On proper treatment , brillinta/asa, statin     9/23 -Received \"1.  Distal right coronary artery concentric 90% stenosis with 0% residual.  LULÚ-3 flow pre and post procedure. Predilatation with 2.5x50 mm TREK balloon.  Stent with 2.5x20 mm Synergy drug-eluting stent.  Postdilatation with 2.5x50 mm NC Emerge balloon.\" 50% occlusion proximal LAD.  May be contributing symptoms.  9/22- Cath this AM.  9/21 - adm - Extreme fatigue with ADL noted for past few months. Episode of dull chest pressure/pain occurred at rest. No prior CAD.  NM with " inferior defect seen possible infarct. Risk factors:  IDDM, family history, HTN. Cardiology consulted for possible cath in a.m.  Results from last 7 days   Lab Units 09/21/19  0536 09/20/19  2253 09/20/19  2049   TROPONIN T [id number]  ng/L 33* 34* 35*           Chronic fatigue- (present on admission)  Assessment & Plan  9/22-9/23 - normal  9/21- adm - Check TSH with free T4.  Recent Labs     09/22/19  0405   TSHULTRASEN 2.430         AXEL (acute kidney injury) (HCC)- (present on admission)  Assessment & Plan  9/22-9/23 - resolved.  9/21- adm - Cr 1.4 on admission. Last Cr normal 7/2018. Started IVFs /hr. Repeat bmp in a.m.  Results from last 7 days   Lab Units 09/23/19  0348 09/22/19  0405 09/20/19 2049   POTASSIUM 102 mmol/L 3.8 3.9 4.2   CREATININE 109 mg/dL 0.91 1.04 1.41*   IF ADRIAN AMER [id number] mL/min/1.73 m 2 >60 >60 >60   IF NON AFRICAN AMER 109GFRB mL/min/1.73 m 2 >60 >60 53*            Essential hypertension- (present on admission)  Assessment & Plan  Continues to have low biatrial high blood pressure.  Currently on lisinopril 40 mg daily, carvedilol 25 mg twice daily.  Added amlodipine 10 mg daily (09/24)   Add hydrochlorothiazide tomorrow 09/25  Continue to monitor     Type 2 diabetes mellitus with complication, with long-term current use of insulin (HCC)- (present on admission)  Assessment & Plan  He is on Trulicity 1.5 mg q. 7 days, glimepiride 4 mg daily, Lantus 10 units at bedtime as outpatient  Last A1c 6.6  Continue insulin sliding scale while in the hospital  Resume home medications on discharge    S/P transmetatarsal amputation of foot, right (HCC)- (present on admission)  Assessment & Plan  Stable, no signs of infection.         VTE prophylaxis: SCD

## 2019-09-25 NOTE — PROGRESS NOTES
Cardiology Follow Up Progress Note    Date of Service  9/25/2019    Attending Physician  Trini Wilde M.D.    Chief Complaint   Chest pain    Cardiology consult   Elevated trop     HPI  Beni Moore is a 49 y.o. male admitted 9/20/2019 with chest pain and shortness of breath.  Patient underwent MPI which was normal.  Troponin was mildly elevated.  He continues to have chest pain and underwent cardiac catheterization which showed stenosis at the distal RCA and had a drug-eluting stent placed.    Past medical history of type 2 diabetes, hypertension, renal insufficiency, status post transmetatarsal amputation of foot.    Interim Events  9/25/19: Patient resting in bed.  Patient stated last night around 8 or 9 PM passed out while on the toilet without any supervision.  He did not fall or hit his head.  Blood pressure was at 110s over 80s.  No changes on the monitor noted at that time.  Most likely was a vasovagal.  Patient ambulated in the hallway today without any chest pain, shortness of breath or dizziness.  Cath site is soft clean and good peripheral pulses.    Review of Systems  Review of Systems   Constitutional: Negative for chills, diaphoresis and fever.   HENT: Negative for nosebleeds and trouble swallowing.    Respiratory: Negative for cough, chest tightness and shortness of breath.    Cardiovascular: Negative for chest pain, palpitations and leg swelling.   Gastrointestinal: Negative for abdominal distention, abdominal pain and blood in stool.   Genitourinary: Negative for hematuria.   Skin: Negative for color change.   Neurological: Negative for dizziness, syncope and numbness.   Psychiatric/Behavioral: Negative for agitation and confusion. The patient is not nervous/anxious.        Vital signs in last 24 hours  Temp:  [35.9 °C (96.7 °F)-37 °C (98.6 °F)] 37 °C (98.6 °F)  Pulse:  [76-92] 84  Resp:  [18] 18  BP: (112-158)/(73-98) 136/83  SpO2:  [96 %-99 %] 99 %    Physical Exam  Physical Exam    Constitutional: He is oriented to person, place, and time. He appears well-developed and well-nourished. No distress.   HENT:   Head: Normocephalic.   Neck: Normal range of motion. No JVD present.   Cardiovascular: Normal rate, regular rhythm, normal heart sounds and intact distal pulses.   No murmur heard.  Pulmonary/Chest: Effort normal and breath sounds normal. No respiratory distress. He has no wheezes.   Abdominal: He exhibits no distension. There is no tenderness.   Musculoskeletal: He exhibits no edema or tenderness.   Neurological: He is alert and oriented to person, place, and time.   Skin: Skin is warm and dry. No rash noted. He is not diaphoretic.   Psychiatric: His behavior is normal. Judgment normal.   Nursing note and vitals reviewed.      Lab Review  Lab Results   Component Value Date/Time    WBC 9.0 09/25/2019 02:23 AM    RBC 4.51 (L) 09/25/2019 02:23 AM    HEMOGLOBIN 13.9 (L) 09/25/2019 02:23 AM    HEMATOCRIT 39.6 (L) 09/25/2019 02:23 AM    MCV 87.8 09/25/2019 02:23 AM    MCH 30.8 09/25/2019 02:23 AM    MCHC 35.1 09/25/2019 02:23 AM    MPV 10.2 09/25/2019 02:23 AM      Lab Results   Component Value Date/Time    SODIUM 137 09/25/2019 02:23 AM    POTASSIUM 4.7 09/25/2019 02:23 AM    CHLORIDE 106 09/25/2019 02:23 AM    CO2 24 09/25/2019 02:23 AM    GLUCOSE 212 (H) 09/25/2019 02:23 AM    BUN 19 09/25/2019 02:23 AM    CREATININE 1.27 09/25/2019 02:23 AM      Lab Results   Component Value Date/Time    ASTSGOT 15 09/25/2019 02:23 AM    ALTSGPT 18 09/25/2019 02:23 AM     Lab Results   Component Value Date/Time    CHOLSTRLTOT 111 09/24/2019 02:38 AM    LDL 64 09/24/2019 02:38 AM    HDL 33 (A) 09/24/2019 02:38 AM    TRIGLYCERIDE 71 09/24/2019 02:38 AM    TROPONINT 45 (H) 09/25/2019 06:21 AM       No results for input(s): NTPROBNP in the last 72 hours.    Cardiac Imaging and Procedures Review  EKG:    SINUS RHYTHM   ABNORMAL INFERIOR Q WAVES   Compared to ECG 09/21/2019 18:22:58   Inferior Q waves now  present   Q waves now present     Echocardiogram:  9/24/19  No prior study is available for comparison.   Technically difficult but otherwise normal study  Left ventricular ejection fraction is visually estimated to be 65%. Grossly   normal regional wall motion. Normal diastolic function.    Cardiac Catheterization:    9/22/19  PROCEDURES:  1.  Left heart catheterization.  2.  Coronary angiography.  3.  PTCA/stent placement of the distal right coronary artery.  4.  Left ventriculogram.  5.  Monitor of conscious sedation.     PREPROCEDURE DIAGNOSES:  1.  Chest pain.  2.  Abnormal myocardial perfusion study.     POSTPROCEDURE DIAGNOSES:  1.  Coronary artery disease with high-grade distal right coronary artery   stenosis, nonobstructive proximal left anterior descending artery and mid to   distal left anterior descending artery stenosis.  2.  Successful percutaneous transluminal coronary angioplasty/stent placement   of the distal right coronary artery with 2.5x20 mm Synergy drug-eluting stent.  3.  Normal left ventricular systolic function with ejection fraction of 65%.  4.  Elevated left ventricular end-diastolic pressure.    Imaging    Stress Test:    9/21/19  Normal left ventricular size, ejection fraction, and wall motion.   No reversible defects that would indicate ischemia.    Non-reversible defect noted could be due to artifact or infarct.    Assessment/Plan  No new Assessment & Plan notes have been filed under this hospital service since the last note was generated.  Service: Cardiology    1. Unstable angina:  - cath high grade distal RCA, nonobstructive proximal LAD and mid to distal LAD stenosis.  Had a drug-eluting stent to the distal RCA with 2.5x20 mm Synergy drug-eluting stent.  - ECHO showed  Ef 65%  - continue asa 81mg qd, atorvastatin 80mg qd, coreg 12.5mg BID, and brilinta 90mg BID   - meds to bed ordered for brilinta     2. Hypertension:  - stable   - continue Norvasc 10 mg daily, Coreg 12.5 mg  twice daily, and hydrochlorothiazide 25 mg daily    Thank you for allowing me to participate in the care of this patient.  Cardiology will sign off on this patient    Please contact me with any questions.    ELVIE Yates   [facility]    Future Appointments   Date Time Provider Department Center   9/26/2019  8:20 AM [facility] [address]   10/2/2019  8:00 AM Yudi Crockett M.D. [address]   10/10/2019  8:20 AM Martha Rubio P.A.-C. [facility] None   11/18/2019 11:20 AM Marita Overton M.D. [facility] None

## 2019-09-25 NOTE — CARE PLAN
Problem: Safety  Goal: Will remain free from injury  Outcome: PROGRESSING AS EXPECTED  Note:   Patient's risk for injury and falls assessed. Appropriate safety precautions in place. Patient educated to utilize call light for needs. Patient verbalizes understanding.      Problem: Infection  Goal: Will remain free from infection  Outcome: PROGRESSING AS EXPECTED  Note:   Patient WBC within normal limits. No open wounds noted on patient. Patient does not complain of SOB. Patient vital signs are stable.

## 2019-09-25 NOTE — CARE PLAN
Problem: Communication  Goal: The ability to communicate needs accurately and effectively will improve  Outcome: PROGRESSING AS EXPECTED  Intervention: [place] patient and significant other/support system to call light to alert staff of needs  Note:   Patient educated to utilize call light. Patient and family oriented to hospital room. Patient encouraged to ask questions about plan of care. Patient effectively uses call light and is involved in POC.      Problem: Safety  Goal: Will remain free from injury  9/25/2019 0036 by Laura Bowers R.N.  Outcome: PROGRESSING AS EXPECTED  Note:   Patient's risk for injury and falls assessed. Appropriate safety precautions in place. Patient educated to utilize call light for needs. Patient verbalizes understanding.        Problem: Infection  Goal: Will remain free from infection  Outcome: PROGRESSING AS EXPECTED  Note:   Patient WBC within normal limits. No open wounds noted on patient. Patient does not complain of SOB. Patient vital signs are stable.

## 2019-09-26 LAB
GLUCOSE BLD-MCNC: 159 MG/DL (ref 65–99)
GLUCOSE BLD-MCNC: 164 MG/DL (ref 65–99)
GLUCOSE BLD-MCNC: 166 MG/DL (ref 65–99)
GLUCOSE BLD-MCNC: 239 MG/DL (ref 65–99)

## 2019-09-26 PROCEDURE — 770020 HCHG ROOM/CARE - TELE (206)

## 2019-09-26 PROCEDURE — 700102 HCHG RX REV CODE 250 W/ 637 OVERRIDE(OP): Performed by: INTERNAL MEDICINE

## 2019-09-26 PROCEDURE — A9270 NON-COVERED ITEM OR SERVICE: HCPCS | Performed by: INTERNAL MEDICINE

## 2019-09-26 PROCEDURE — A9270 NON-COVERED ITEM OR SERVICE: HCPCS | Performed by: HOSPITALIST

## 2019-09-26 PROCEDURE — 82962 GLUCOSE BLOOD TEST: CPT | Mod: 91

## 2019-09-26 PROCEDURE — 99232 SBSQ HOSP IP/OBS MODERATE 35: CPT | Performed by: INTERNAL MEDICINE

## 2019-09-26 PROCEDURE — 700102 HCHG RX REV CODE 250 W/ 637 OVERRIDE(OP): Performed by: HOSPITALIST

## 2019-09-26 RX ORDER — CARVEDILOL 6.25 MG/1
6.25 TABLET ORAL 2 TIMES DAILY WITH MEALS
Status: DISCONTINUED | OUTPATIENT
Start: 2019-09-26 | End: 2019-09-27 | Stop reason: HOSPADM

## 2019-09-26 RX ADMIN — TICAGRELOR 90 MG: 90 TABLET ORAL at 17:39

## 2019-09-26 RX ADMIN — INSULIN HUMAN 1 UNITS: 100 INJECTION, SOLUTION PARENTERAL at 17:42

## 2019-09-26 RX ADMIN — AMLODIPINE BESYLATE 5 MG: 5 TABLET ORAL at 06:06

## 2019-09-26 RX ADMIN — CARVEDILOL 12.5 MG: 12.5 TABLET, FILM COATED ORAL at 08:23

## 2019-09-26 RX ADMIN — INSULIN HUMAN 2 UNITS: 100 INJECTION, SOLUTION PARENTERAL at 12:11

## 2019-09-26 RX ADMIN — INSULIN HUMAN 1 UNITS: 100 INJECTION, SOLUTION PARENTERAL at 06:07

## 2019-09-26 RX ADMIN — INSULIN HUMAN 1 UNITS: 100 INJECTION, SOLUTION PARENTERAL at 00:03

## 2019-09-26 RX ADMIN — ATORVASTATIN CALCIUM 80 MG: 80 TABLET, FILM COATED ORAL at 21:29

## 2019-09-26 RX ADMIN — TICAGRELOR 90 MG: 90 TABLET ORAL at 06:06

## 2019-09-26 RX ADMIN — OMEPRAZOLE 20 MG: 20 CAPSULE, DELAYED RELEASE ORAL at 06:06

## 2019-09-26 RX ADMIN — ASPIRIN 81 MG: 81 TABLET, COATED ORAL at 06:05

## 2019-09-26 RX ADMIN — LISINOPRIL 40 MG: 20 TABLET ORAL at 06:05

## 2019-09-26 RX ADMIN — CARVEDILOL 6.25 MG: 6.25 TABLET, FILM COATED ORAL at 17:39

## 2019-09-26 ASSESSMENT — ENCOUNTER SYMPTOMS
DOUBLE VISION: 0
FEVER: 0
COUGH: 0
HEARTBURN: 0
BLURRED VISION: 0
MYALGIAS: 0
PALPITATIONS: 0
DIARRHEA: 0
DIZZINESS: 1
ABDOMINAL PAIN: 0
VOMITING: 0
HEADACHES: 0
CONSTIPATION: 0
NAUSEA: 0
CHILLS: 0

## 2019-09-26 NOTE — CARE PLAN
Problem: Safety  Goal: Will remain free from injury  Outcome: PROGRESSING AS EXPECTED  Goal: Will remain free from falls  Outcome: PROGRESSING AS EXPECTED     Fall precautions in place. Treaded socks on pt. Appropriate signs on doorway. IV pole on same side as bathroom. Bedrails up. Bed in lowest position and locked.  Call light and phone within reach. Patient educated on importance of calling nurses before getting out of bed, verbalizes understanding. Bed alarm on.    Problem: Infection  Goal: Will remain free from infection  Outcome: PROGRESSING AS EXPECTED     Assess for signs and symptoms of infection. Monitor vital signs and lab values. Implement standard precautions and hand washing before and after pt contact.

## 2019-09-26 NOTE — THERAPY
PT tx attempted to provide further cardiac rehab education and walking tolerance assessment. Per RN, pt continues to have (+) orthostatics upon mobilizing to EOB. Will defer PT tx until pt BP is more stable to optimize cardiac rehab education and activity.     Justine Stevenson, PT, DPT Pager: [phone]

## 2019-09-26 NOTE — CARE PLAN
Problem: Safety  Goal: Will remain free from falls  Outcome: PROGRESSING AS EXPECTED  Intervention: Implement fall precautions  Flowsheets (Taken 9/25/2019 0800 by Manny Hamilton RJUSTUS)  Environmental Precautions: Treaded Slipper Socks on Patient;Personal Belongings, Wastebasket, Call Bell etc. in Easy Reach;Bed in Low Position  Note:   Patient alert and oriented. Patient calls appropriately. Remains free from falls at this time.      Problem: Knowledge Deficit  Goal: Knowledge of the prescribed therapeutic regimen will improve  Outcome: PROGRESSING AS EXPECTED  Note:   Patient is educated of disease process and condition. Treatment team has included patient in plan of care. All medications indications and side effects are explained. Patient is encouraged to ask questions. Patient indicates understanding.

## 2019-09-26 NOTE — PROGRESS NOTES
PT BP dropped to 74/56 while on the bedside commode having a bowel movement. PT assisted back to bed where BP stabilized to 123/84. Hourly rounding: will closely monitor.

## 2019-09-26 NOTE — PROGRESS NOTES
Handoff report received. Assumed patient care. PT is sitting up in bed with his wife at bedside, AAOx4, no complaints of pain or discomfort. POC discussed with PT and all questions addressed. Safety precautions in place. Call light and personal belongings within reach. Educated to call for assistance if needed.

## 2019-09-26 NOTE — PROGRESS NOTES
Bedside report received, pt care assumed, tele box on.  Pt is A&Ox4, sitting unp in bed, and denies any additional needs at this time. Bed in lowest position, bed alarm on pt educated on fall risk and verbalized understanding, call light within reach.

## 2019-09-26 NOTE — PROGRESS NOTES
Hospital Medicine Daily Progress Note    Date of Service  9/25/2019    Chief Complaint  49 y.o. male admitted 9/20/2019 with chest pain     Hospital Course    49-year-old male past medical history of insulin-dependent diabetes mellitus, prior right TMA, diabetic retinopathy, coronary artery disease status post stent placement presented to with chest pain.  Cardiac catheterization done 9/22/2019 showed , nonobstructive proximal left anterior descending artery and mid to   distal left anterior descending artery stenosis, PCI placement. He was started on brillinta/asa. EF 65%. BB also added. BP not well controled during this hospital stay and medication titrated.        Interval Problem Update  Eager to go home. BP still not well controled. Continue to monitor   09/25-he had a similar syncope episode overnight, while he was using restroom.  He described as sweating, lightheadedness.  He did not completely lost his consciousness.  Typical for vasovagal response. Orthostatic positive later today, after HCTZ use. BP meds decrease. Will continue to monitor again today. Otherwise he states that he feels better then ever     Consultants/Specialty  Cardiology     Code Status  Full code     Disposition  Inpatient     Review of Systems  Review of Systems   Constitutional: Negative for chills and fever.   Eyes: Negative for blurred vision and double vision.   Respiratory: Negative for cough.    Cardiovascular: Negative for chest pain, palpitations and leg swelling.   Gastrointestinal: Negative for abdominal pain, constipation, diarrhea, heartburn, nausea and vomiting.   Genitourinary: Negative for dysuria.   Musculoskeletal: Negative for myalgias.   Neurological: Positive for dizziness. Negative for headaches.        Physical Exam  Temp:  [35.9 °C (96.7 °F)-37 °C (98.6 °F)] 36.8 °C (98.3 °F)  Pulse:  [79-92] 84  Resp:  [18-19] 19  BP: ()/(45-94) 144/87  SpO2:  [96 %-99 %] 96 %    Physical Exam   Constitutional: He is  oriented to person, place, and time. He appears well-developed and well-nourished. No distress.   HENT:   Head: Normocephalic.   Cardiovascular: Normal rate and regular rhythm.   No murmur heard.  Pulmonary/Chest: Effort normal and breath sounds normal. No respiratory distress. He has no wheezes.   Abdominal: Soft. Bowel sounds are normal. He exhibits no distension.   Musculoskeletal: Normal range of motion. He exhibits no edema.   Neurological: He is alert and oriented to person, place, and time.   Skin: Skin is warm.   Psychiatric: He has a normal mood and affect. His behavior is normal.   Nursing note and vitals reviewed.      Fluids    Intake/Output Summary (Last 24 hours) at 9/25/2019 1801  Last data filed at 9/25/2019 0200  Gross per 24 hour   Intake 240 ml   Output 300 ml   Net -60 ml       Laboratory  Recent Labs     09/23/19 0348 09/25/19  0223   WBC 6.3 9.0   RBC 4.49* 4.51*   HEMOGLOBIN 13.9* 13.9*   HEMATOCRIT 39.5* 39.6*   MCV 88.0 87.8   MCH 31.0 30.8   MCHC 35.2 35.1   RDW 40.4 40.4   PLATELETCT 241 267   MPV 10.4 10.2     Recent Labs     09/23/19  0348 09/25/19  0223   SODIUM 140 137   POTASSIUM 3.8 4.7   CHLORIDE 108 106   CO2 25 24   GLUCOSE 118* 212*   BUN 13 19   CREATININE 0.91 1.27   CALCIUM 8.9 9.3             Recent Labs     09/24/19  0238   TRIGLYCERIDE 71   HDL 33*   LDL 64       Imaging  EC-ECHOCARDIOGRAM COMPLETE W/O CONT   Final Result      NM-CARDIAC STRESS TEST   Final Result      DX-CHEST-PORTABLE (1 VIEW)   Final Result         1.  No acute cardiopulmonary disease.      CL-LEFT HEART CATHETERIZATION WITH POSSIBLE INTERVENTION    (Results Pending)        Assessment/Plan  * NSTEMI (non-ST elevated myocardial infarction) (HCC)- (present on admission)  Assessment & Plan  Status post catheter and stent placement 09/22   Cardiology following  No more chest pain   Proper medical management including beta-blocker, statins, aspirin, Brilinta  Echocardiogram  9/24 after PCI placement ejection  fraction 65%.  He will need to continue cardiac rehab.  Referral in place       Chronic fatigue- (present on admission)  Assessment & Plan  I suspect due to coronary artery disease.  TSH normal 09/24       AXEL (acute kidney injury) (HCC)- (present on admission)  Assessment & Plan  9/22-9/23 - resolved.  9/21- adm - Cr 1.4 on admission.  Continue to monitor     Essential hypertension- (present on admission)  Assessment & Plan  Orthostatic positive 09/25 after taking HCTZ  Vasovagal response 09/24.    Currently on lisinopril 40 mg daily, carvedilol 25 mg twice daily.  Decrease carvedilol 12.5 mg twice daily 09/25  Added amlodipine 10 mg daily (09/24) decreased to 5 mg 09/25  Add hydrochlorothiazide 09/25 and discontinued after orthostatics positive  Bolus 500 cc  Continue to monitor .  Possible discharge 09/26    Type 2 diabetes mellitus with complication, with long-term current use of insulin (HCC)- (present on admission)  Assessment & Plan  He is on Trulicity 1.5 mg q. 7 days, glimepiride 4 mg daily, Lantus 10 units at bedtime as outpatient  Last A1c 6.6  Continue insulin sliding scale while in the hospital  Resume home medications on discharge    S/P transmetatarsal amputation of foot, right (HCC)- (present on admission)  Assessment & Plan  Stable, no signs of infection.       VTE prophylaxis: SCD

## 2019-09-27 VITALS
BODY MASS INDEX: 29.89 KG/M2 | HEIGHT: 72 IN | WEIGHT: 220.68 LBS | OXYGEN SATURATION: 96 % | HEART RATE: 89 BPM | SYSTOLIC BLOOD PRESSURE: 84 MMHG | TEMPERATURE: 97.6 F | RESPIRATION RATE: 18 BRPM | DIASTOLIC BLOOD PRESSURE: 61 MMHG

## 2019-09-27 PROBLEM — A49.01 MSSA (METHICILLIN SUSCEPTIBLE STAPHYLOCOCCUS AUREUS) INFECTION: Status: RESOLVED | Noted: 2018-07-05 | Resolved: 2019-09-27

## 2019-09-27 PROBLEM — M86.271 SUBACUTE OSTEOMYELITIS OF RIGHT FOOT (HCC): Status: RESOLVED | Noted: 2018-06-30 | Resolved: 2019-09-27

## 2019-09-27 PROBLEM — K59.1 FUNCTIONAL DIARRHEA: Status: ACTIVE | Noted: 2019-09-27

## 2019-09-27 LAB
ALBUMIN SERPL BCP-MCNC: 4.3 G/DL (ref 3.2–4.9)
ALBUMIN/GLOB SERPL: 1.7 G/DL
ALP SERPL-CCNC: 45 U/L (ref 30–99)
ALT SERPL-CCNC: 27 U/L (ref 2–50)
ANION GAP SERPL CALC-SCNC: 8 MMOL/L (ref 0–11.9)
AST SERPL-CCNC: 23 U/L (ref 12–45)
BASOPHILS # BLD AUTO: 0.5 % (ref 0–1.8)
BASOPHILS # BLD: 0.06 K/UL (ref 0–0.12)
BILIRUB SERPL-MCNC: 1.1 MG/DL (ref 0.1–1.5)
BUN SERPL-MCNC: 26 MG/DL (ref 8–22)
CALCIUM SERPL-MCNC: 9.3 MG/DL (ref 8.5–10.5)
CHLORIDE SERPL-SCNC: 107 MMOL/L (ref 96–112)
CO2 SERPL-SCNC: 23 MMOL/L (ref 20–33)
CREAT SERPL-MCNC: 1.23 MG/DL (ref 0.5–1.4)
EOSINOPHIL # BLD AUTO: 0.3 K/UL (ref 0–0.51)
EOSINOPHIL NFR BLD: 2.7 % (ref 0–6.9)
ERYTHROCYTE [DISTWIDTH] IN BLOOD BY AUTOMATED COUNT: 41.1 FL (ref 35.9–50)
GLOBULIN SER CALC-MCNC: 2.6 G/DL (ref 1.9–3.5)
GLUCOSE BLD-MCNC: 198 MG/DL (ref 65–99)
GLUCOSE SERPL-MCNC: 183 MG/DL (ref 65–99)
HCT VFR BLD AUTO: 40.9 % (ref 42–52)
HGB BLD-MCNC: 13.9 G/DL (ref 14–18)
IMM GRANULOCYTES # BLD AUTO: 0.03 K/UL (ref 0–0.11)
IMM GRANULOCYTES NFR BLD AUTO: 0.3 % (ref 0–0.9)
LYMPHOCYTES # BLD AUTO: 2.19 K/UL (ref 1–4.8)
LYMPHOCYTES NFR BLD: 19.9 % (ref 22–41)
MCH RBC QN AUTO: 30.2 PG (ref 27–33)
MCHC RBC AUTO-ENTMCNC: 34 G/DL (ref 33.7–35.3)
MCV RBC AUTO: 88.9 FL (ref 81.4–97.8)
MONOCYTES # BLD AUTO: 0.99 K/UL (ref 0–0.85)
MONOCYTES NFR BLD AUTO: 9 % (ref 0–13.4)
NEUTROPHILS # BLD AUTO: 7.42 K/UL (ref 1.82–7.42)
NEUTROPHILS NFR BLD: 67.6 % (ref 44–72)
NRBC # BLD AUTO: 0 K/UL
NRBC BLD-RTO: 0 /100 WBC
PLATELET # BLD AUTO: 282 K/UL (ref 164–446)
PMV BLD AUTO: 10.5 FL (ref 9–12.9)
POTASSIUM SERPL-SCNC: 3.9 MMOL/L (ref 3.6–5.5)
PROT SERPL-MCNC: 6.9 G/DL (ref 6–8.2)
RBC # BLD AUTO: 4.6 M/UL (ref 4.7–6.1)
SODIUM SERPL-SCNC: 138 MMOL/L (ref 135–145)
WBC # BLD AUTO: 11 K/UL (ref 4.8–10.8)

## 2019-09-27 PROCEDURE — 97535 SELF CARE MNGMENT TRAINING: CPT

## 2019-09-27 PROCEDURE — 36415 COLL VENOUS BLD VENIPUNCTURE: CPT

## 2019-09-27 PROCEDURE — 700102 HCHG RX REV CODE 250 W/ 637 OVERRIDE(OP): Performed by: INTERNAL MEDICINE

## 2019-09-27 PROCEDURE — 99239 HOSP IP/OBS DSCHRG MGMT >30: CPT | Performed by: INTERNAL MEDICINE

## 2019-09-27 PROCEDURE — 80053 COMPREHEN METABOLIC PANEL: CPT

## 2019-09-27 PROCEDURE — A9270 NON-COVERED ITEM OR SERVICE: HCPCS | Performed by: INTERNAL MEDICINE

## 2019-09-27 PROCEDURE — A9270 NON-COVERED ITEM OR SERVICE: HCPCS | Performed by: HOSPITALIST

## 2019-09-27 PROCEDURE — 97530 THERAPEUTIC ACTIVITIES: CPT

## 2019-09-27 PROCEDURE — 82962 GLUCOSE BLOOD TEST: CPT

## 2019-09-27 PROCEDURE — 85025 COMPLETE CBC W/AUTO DIFF WBC: CPT

## 2019-09-27 PROCEDURE — 700102 HCHG RX REV CODE 250 W/ 637 OVERRIDE(OP): Performed by: HOSPITALIST

## 2019-09-27 RX ORDER — ATORVASTATIN CALCIUM 80 MG/1
80 TABLET, FILM COATED ORAL
Qty: 90 TAB | Refills: 0 | Status: SHIPPED | OUTPATIENT
Start: 2019-09-27 | End: 2019-12-16 | Stop reason: SDUPTHER

## 2019-09-27 RX ORDER — LISINOPRIL 40 MG/1
20 TABLET ORAL DAILY
Qty: 90 TAB | Refills: 0 | Status: SHIPPED | OUTPATIENT
Start: 2019-09-27 | End: 2019-10-10

## 2019-09-27 RX ORDER — CARVEDILOL 6.25 MG/1
6.25 TABLET ORAL 2 TIMES DAILY WITH MEALS
Qty: 180 TAB | Refills: 0 | Status: SHIPPED | OUTPATIENT
Start: 2019-09-27 | End: 2019-12-16 | Stop reason: SDUPTHER

## 2019-09-27 RX ORDER — ASPIRIN 81 MG/1
81 TABLET ORAL DAILY
Qty: 90 TAB | Refills: 0 | Status: SHIPPED | OUTPATIENT
Start: 2019-09-28 | End: 2019-12-16 | Stop reason: SDUPTHER

## 2019-09-27 RX ADMIN — INSULIN HUMAN 1 UNITS: 100 INJECTION, SOLUTION PARENTERAL at 00:02

## 2019-09-27 RX ADMIN — INSULIN HUMAN 1 UNITS: 100 INJECTION, SOLUTION PARENTERAL at 06:30

## 2019-09-27 RX ADMIN — LISINOPRIL 40 MG: 20 TABLET ORAL at 05:21

## 2019-09-27 RX ADMIN — OMEPRAZOLE 20 MG: 20 CAPSULE, DELAYED RELEASE ORAL at 05:20

## 2019-09-27 RX ADMIN — ASPIRIN 81 MG: 81 TABLET, COATED ORAL at 05:21

## 2019-09-27 RX ADMIN — CARVEDILOL 6.25 MG: 6.25 TABLET, FILM COATED ORAL at 09:11

## 2019-09-27 RX ADMIN — TICAGRELOR 90 MG: 90 TABLET ORAL at 05:21

## 2019-09-27 ASSESSMENT — COGNITIVE AND FUNCTIONAL STATUS - GENERAL
SUGGESTED CMS G CODE MODIFIER MOBILITY: CH
MOBILITY SCORE: 24

## 2019-09-27 ASSESSMENT — GAIT ASSESSMENTS
DEVIATION: ANTALGIC
GAIT LEVEL OF ASSIST: SUPERVISED
DISTANCE (FEET): 1000

## 2019-09-27 NOTE — PROGRESS NOTES
Hospital Medicine Daily Progress Note    Date of Service  9/26/2019    Chief Complaint  49 y.o. male admitted 9/20/2019 with chest pain     Hospital Course    49-year-old male past medical history of insulin-dependent diabetes mellitus, prior right TMA, diabetic retinopathy, coronary artery disease status post stent placement presented to with chest pain.  Cardiac catheterization done 9/22/2019 showed , nonobstructive proximal left anterior descending artery and mid to   distal left anterior descending artery stenosis, PCI placement. He was started on brillinta/asa. EF 65%. BB also added. BP not well controled during this hospital stay and medication titrated.        Interval Problem Update  Eager to go home. BP still not well controled. Continue to monitor   09/25-he had a similar syncope episode overnight, while he was using restroom.  He described as sweating, lightheadedness.  He did not completely lost his consciousness.  Typical for vasovagal response. Orthostatic positive later today, after HCTZ use. BP meds decrease. Will continue to monitor again today. Otherwise he states that he feels better then ever   09/26-he is feeling slight better however still Ortho static still positive.  No safe discharge.  I will continue IV fluid.  Hold amlodipine.    Consultants/Specialty  Cardiology     Code Status  Full code     Disposition  Inpatient     Review of Systems  Review of Systems   Constitutional: Negative for chills and fever.   Eyes: Negative for blurred vision and double vision.   Respiratory: Negative for cough.    Cardiovascular: Negative for chest pain, palpitations and leg swelling.   Gastrointestinal: Negative for abdominal pain, constipation, diarrhea, heartburn, nausea and vomiting.   Genitourinary: Negative for dysuria.   Musculoskeletal: Negative for myalgias.   Neurological: Positive for dizziness. Negative for headaches.        Physical Exam  Temp:  [36.8 °C (98.3 °F)-37.4 °C (99.4 °F)] 37.2 °C  (98.9 °F)  Pulse:  [78-89] 83  Resp:  [17-18] 17  BP: ()/(56-87) 78/60  SpO2:  [94 %-98 %] 94 %    Physical Exam   Constitutional: He is oriented to person, place, and time. He appears well-developed and well-nourished. No distress.   HENT:   Head: Normocephalic.   Cardiovascular: Normal rate and regular rhythm.   No murmur heard.  Pulmonary/Chest: Effort normal and breath sounds normal. No respiratory distress. He has no wheezes.   Abdominal: Soft. Bowel sounds are normal. He exhibits no distension.   Musculoskeletal: Normal range of motion. He exhibits no edema.   Neurological: He is alert and oriented to person, place, and time.   Skin: Skin is warm.   Psychiatric: He has a normal mood and affect. His behavior is normal.   Nursing note and vitals reviewed.      Fluids    Intake/Output Summary (Last 24 hours) at 9/26/2019 1700  Last data filed at 9/26/2019 0800  Gross per 24 hour   Intake 1480 ml   Output 300 ml   Net 1180 ml       Laboratory  Recent Labs     09/25/19  0223   WBC 9.0   RBC 4.51*   HEMOGLOBIN 13.9*   HEMATOCRIT 39.6*   MCV 87.8   MCH 30.8   MCHC 35.1   RDW 40.4   PLATELETCT 267   MPV 10.2     Recent Labs     09/25/19  0223   SODIUM 137   POTASSIUM 4.7   CHLORIDE 106   CO2 24   GLUCOSE 212*   BUN 19   CREATININE 1.27   CALCIUM 9.3             Recent Labs     09/24/19  0238   TRIGLYCERIDE 71   HDL 33*   LDL 64       Imaging  EC-ECHOCARDIOGRAM COMPLETE W/O CONT   Final Result      NM-CARDIAC STRESS TEST   Final Result      DX-CHEST-PORTABLE (1 VIEW)   Final Result         1.  No acute cardiopulmonary disease.      CL-LEFT HEART CATHETERIZATION WITH POSSIBLE INTERVENTION    (Results Pending)        Assessment/Plan  * NSTEMI (non-ST elevated myocardial infarction) (HCC)- (present on admission)  Assessment & Plan  Status post catheter and stent placement 09/22   Cardiology following  No more chest pain   Proper medical management including beta-blocker, statins, aspirin, Brilinta  Echocardiogram   9/24 after PCI placement ejection fraction 65%.  He will need to continue cardiac rehab.  Referral in place       Chronic fatigue- (present on admission)  Assessment & Plan  I suspect due to coronary artery disease.  TSH normal 09/24       AXEL (acute kidney injury) (HCC)- (present on admission)  Assessment & Plan  9/22-9/23 - resolved.  9/21- adm - Cr 1.4 on admission.  Continue to monitor     Essential hypertension- (present on admission)  Assessment & Plan  09/26-orthostatics again positive today after 1 L of bolus .  Hold amlodipine.  Continue lisinopril and carvedilol.  Reevaluate tomorrow  orthostatic positive 09/25 after taking HCTZ  Vasovagal response 09/24.    Currently on lisinopril 40 mg daily, carvedilol 25 mg twice daily.  Decrease carvedilol 12.5 mg twice daily 09/25  Added amlodipine 10 mg daily (09/24) decreased to 5 mg 09/25  Add hydrochlorothiazide 09/25 and discontinued after orthostatics positive  Bolus 500 cc  Continue to monitor .  Possible discharge 09/26    Type 2 diabetes mellitus with complication, with long-term current use of insulin (HCC)- (present on admission)  Assessment & Plan  He is on Trulicity 1.5 mg q. 7 days, glimepiride 4 mg daily, Lantus 10 units at bedtime as outpatient  Last A1c 6.6  Continue insulin sliding scale while in the hospital  Resume home medications on discharge    S/P transmetatarsal amputation of foot, right (HCC)- (present on admission)  Assessment & Plan  Stable, no signs of infection.       VTE prophylaxis: SCD

## 2019-09-27 NOTE — PROGRESS NOTES
Bedside report received, pt care assumed, tele box on.  Pt is A&Ox4, resting in bed, continues to have positive orthostatic blood pressures. Pt denies any additional needs at this time. Bed in lowest position, bed alarm on pt educated on fall risk and verbalized understanding, call light within reach.

## 2019-09-27 NOTE — DISCHARGE INSTRUCTIONS
Discharge Instructions per Trini Wilde M.D.    Decrease lisinopril to 20 mg daily.  Continue home medication as previously prescribed  Start new medication including aspirin 81 mg daily, Brilinta, carvedilol 6.25 mg twice daily, atorvastatin 80 mg daily  We will up with cardiologist within 2 weeks  follow up with gastroenterologist in regard to chronic diarrhea within 2 weeks    DIET: Healthy, regular    ACTIVITY: As tolerated    DIAGNOSIS: Non-STEMI, hypertension    Return to ER if worsening severe chest pain, shortness of breath, fever, confusion, severe headache, unusual bleeding    Discharge Instructions    Discharged to home by car with relative. Discharged via wheelchair, hospital escort: Yes.  Special equipment needed: Not Applicable    Be sure to schedule a follow-up appointment with your primary care doctor or any specialists as instructed.     Discharge Plan:   Diet Plan: Discussed  Activity Level: Discussed  Confirmed Follow up Appointment: Appointment Scheduled  Confirmed Symptoms Management: Discussed  Medication Reconciliation Updated: Yes  Influenza Vaccine Indication: Patient Refuses    I understand that a diet low in cholesterol, fat, and sodium is recommended for good health. Unless I have been given specific instructions below for another diet, I accept this instruction as my diet prescription.   Other diet: Cardiac/Diabetic    Special Instructions: Diagnosis:  Acute Coronary Syndrome (ACS) is a diagnosis that encompasses cardiac-related chest pain and heart attack. ACS occurs when the blood flow to the heart muscle is severely reduced or cut off completely due to a slow process called atherosclerosis.  Atherosclerosis is a disease in which the coronary arteries become narrow from a buildup of fat, cholesterol, and other substances that combine to form plaque. If the plaque breaks, a blood clot will form and block the blood flow to the heart muscle. This lack of blood flow can cause damage or  death to the heart muscle which is called a heart attack or Myocardial Infarction (MI). There are two different types of MIs:  ST Elevation Myocardial Infarction or STEMI (the most severe type of heart attack) and Non-ST Elevation Myocardial Infarction or NSTEMI.    Treatment Plan:  · Cardiac Diet  - Low fat, low salt, low cholesterol   · Cardiac Rehab  - Your doctor has ordered you a referral to [facility].  Call [phone] to schedule an appointment.  · Attend my follow-up appointment with my Cardiologist.  · Take my medications as prescribed by my doctor  · Exercise daily  · Control my diabetes    Medications:  Certain medications are used to treat ACS.  Remember to always take medications as prescribed and never stop talking medications unless told by your doctor.    You have been prescribed the following medicatons:    Anti-platelet/blood thinner - Your Anti-platelet/Blood thinning medication is called Brilinta and aspirin, and is used in combination with aspirin to prevent clots from forming in your heart and/or around your stent.  Your doctor will determine how long you need to be on this medicine.    · Is patient discharged on Warfarin / Coumadin?   No     Depression / Suicide Risk    As you are discharged from this [facility] facility, it is important to learn how to keep safe from harming yourself.    Recognize the warning signs:  · Abrupt changes in personality, positive or negative- including increase in energy   · Giving away possessions  · Change in eating patterns- significant weight changes-  positive or negative  · Change in sleeping patterns- unable to sleep or sleeping all the time   · Unwillingness or inability to communicate  · Depression  · Unusual sadness, discouragement and loneliness  · Talk of wanting to die  · Neglect of personal appearance   · Rebelliousness- reckless behavior  · Withdrawal from people/activities they love  · Confusion- inability to concentrate     If you or a loved  one observes any of these behaviors or has concerns about self-harm, here's what you can do:  · Talk about it- your feelings and reasons for harming yourself  · Remove any means that you might use to hurt yourself (examples: pills, rope, extension cords, firearm)  · Get professional help from the community (Mental Health, Substance Abuse, psychological counseling)  · Do not be alone:Call your Safe Contact- someone whom you trust who will be there for you.  · Call your local CRISIS HOTLINE 611-7795 or 627-217-0665  · Call your local Children's Mobile Crisis Response Team [place] (251) 767-1111 or www.Zenkars  · Call the toll free National Suicide Prevention Hotlines   · National Suicide Prevention Lifeline 513-881-UANO (8858)  · National Hope Line Network 800-SUICIDE (386-8054)

## 2019-09-27 NOTE — PROGRESS NOTES
Discharge orders received. All lines and monitors discontinued. Reviewed discharge paperwork with patient and spouse. Discussed diet, activity, medications, follow up care and worsening symptoms. No questions at this time. Pt to be discharged to home via car.

## 2019-09-27 NOTE — CARE PLAN
Problem: Discharge Barriers/Planning  Goal: Patient's continuum of care needs will be met  Outcome: MET

## 2019-09-27 NOTE — DISCHARGE SUMMARY
Discharge Summary    CHIEF COMPLAINT ON ADMISSION  Chief Complaint   Patient presents with   • Chest Pain       Reason for Admission  Chest Pain     Admission Date  9/20/2019    CODE STATUS  Full Code    HPI & HOSPITAL COURSE     49-year-old male past medical history of insulin-dependent diabetes mellitus, prior right TMA, diabetic retinopathy, coronary artery disease status post stent placement presented to with chest pain.  Cardiac catheterization done 9/22/2019 showed , nonobstructive proximal left anterior descending artery and mid to   distal left anterior descending artery stenosis, PCI placement. He was started on brillinta/asa. EF 65%. BB also added. BP not well controled during this hospital, therefore initially he was started on amlodipine 10 mg daily, carvedilol 25 mg twice daily, HCTZ 25.  Patient started to have orthostatics positive.  He was given additional 2 nights with IV fluids bolus however orthostatics still positive.  Medication were titrated to lisinopril 40 mg as he was taking before admission and carvedilol 6.25.  Continue discharge patient was not symptomatic.  He was sharing that he has a chronic intermittent diarrhea.  He has had multiple work-up with gastroenterologist including colonoscopy and multiple tests.  Still there is not clear etiology of his diarrhea. He is on viberzi per GI and seems to work. Imodium does not work for him. Diarrhea is the cause of orthostatic. He is planning to follow up with  and [facility] for further eval. At time of discharge patient was felt safe for discharge and close follow-up with cardiac rehab and gastroenterologist.  I did advise patient to cut lisinopril to 20 mg and close monitoring of blood pressure. If blood pressure continues to increase advising to increase lisinopril and discussed with cardiology adding other medication once diarrhea stops with Viberzi. Patient aggress with the plan and he has close follow up.     Therefore, he is  discharged in good and stable condition to home with close outpatient follow-up.    The patient met 2-midnight criteria for an inpatient stay at the time of discharge.    Discharge Date  09/27/2019    FOLLOW UP ITEMS POST DISCHARGE  None     DISCHARGE DIAGNOSES  Principal Problem:    NSTEMI (non-ST elevated myocardial infarction) ([facility]) POA: Yes  Active Problems:    S/P transmetatarsal amputation of foot, right ([facility]) POA: Yes    Type 2 diabetes mellitus with complication, with long-term current use of insulin ([facility]) POA: Yes    Essential hypertension POA: Yes    AXEL (acute kidney injury) ([facility]) POA: Yes    Chronic fatigue POA: Yes    Functional diarrhea POA: Yes  Resolved Problems:    * No resolved hospital problems. *      FOLLOW UP  Future Appointments   Date Time Provider Department Center   10/1/2019  9:40 AM [facility] MA[facility] [address]   10/2/2019  8:00 AM Yudi Crockett M.D. [address]   10/10/2019  8:20 AM Martha Rubio P.A.-C. [facility] None   11/18/2019 11:20 AM Marita Overton M.D. [facility] None     Nolan Buchanan D.O.  [address] [facility] [id number] [phone]            MEDICATIONS ON DISCHARGE     Medication List      START taking these medications      Instructions   * aspirin 81 MG Chew chewable tablet  Commonly known as:  ASA   Take 1 Tab by mouth every day.  Dose:  81 mg     * aspirin 81 MG EC tablet  Start taking on:  9/28/2019   Take 1 Tab by mouth every day.  Dose:  81 mg     atorvastatin 80 MG tablet  Commonly known as:  LIPITOR   Take 1 Tab by mouth every bedtime.  Dose:  80 mg     carvedilol 6.25 MG Tabs  Commonly known as:  COREG   Take 1 Tab by mouth 2 times a day, with meals.  Dose:  6.25 mg     metoprolol 25 MG Tabs  Commonly known as:  LOPRESSOR   Take 1 Tab by mouth 2 times a day.  Dose:  25 mg     ticagrelor 90 MG Tabs tablet  Commonly known as:  BRILINTA   Doctor's comments:  Meds to Beds.  TO Redet Alexandre  Take 1 Tab by mouth 2 Times a Day.  Dose:  90  mg         * This list has 2 medication(s) that are the same as other medications prescribed for you. Read the directions carefully, and ask your doctor or other care provider to review them with you.            CHANGE how you take these medications      Instructions   lisinopril 40 MG tablet  What changed:  how much to take  Commonly known as:  PRINIVIL, ZESTRIL   Take 0.5 Tabs by mouth every day.  Dose:  20 mg        CONTINUE taking these medications      Instructions   glimepiride 4 MG Tabs  Commonly known as:  AMARYL   Take 4 mg by mouth every day.  Dose:  4 mg     insulin glargine 100 UNIT/ML Soln  Commonly known as:  LANTUS   Inject 10 Units as instructed every evening.  Dose:  10 Units     omeprazole 20 MG delayed-release capsule  Commonly known as:  PRILOSEC   Take 20 mg by mouth every day.  Dose:  20 mg     TRULICITY 1.5 MG/0.5ML Sopn  Generic drug:  Dulaglutide   1.5 mg by Injection route every 7 days.  Dose:  1.5 mg     VIBERZI 75 MG Tabs  Generic drug:  Eluxadoline   Take 1 Tab by mouth 2 Times a Day.  Dose:  1 Tab            Allergies  No Known Allergies    DIET  Orders Placed This Encounter   Procedures   • Diet Order Cardiac     Standing Status:   Standing     Number of Occurrences:   1     Order Specific Question:   Diet:     Answer:   Cardiac [6]       ACTIVITY  As tolerated.  Weight bearing as tolerated    CONSULTATIONS  Cardiology     PROCEDURES  PCI placement     LABORATORY  Lab Results   Component Value Date    SODIUM 138 09/27/2019    POTASSIUM 3.9 09/27/2019    CHLORIDE 107 09/27/2019    CO2 23 09/27/2019    GLUCOSE 183 (H) 09/27/2019    BUN 26 (H) 09/27/2019    CREATININE 1.23 09/27/2019        Lab Results   Component Value Date    WBC 11.0 (H) 09/27/2019    HEMOGLOBIN 13.9 (L) 09/27/2019    HEMATOCRIT 40.9 (L) 09/27/2019    PLATELETCT 282 09/27/2019        Total time of the discharge process exceeds 30 minutes.

## 2019-09-27 NOTE — THERAPY
"Pt seen for further cardiac rehab education and mobility assessment prior to DC home. Pt contiues to have low BP upon standing and with activity. BP taken after 1st lap around unit (500 ft) and noted to be 94/56, 2nd lap (1000 ft) BP to 74/63 with pt feeling \"sweaty on his back,\" but no lighthead/dizziness. Pt and spouse provided with further cardiac rehab education regarding home walking program, talk test, symptom and HR monitorning and Cardiac Rehab phase II program. Pt with no further acute PT needs at he is anticipating DC home today.     Physical Therapy Treatment completed.   Bed Mobility:  Supine to Sit: Supervised  Transfers: Sit to Stand: Supervised  Gait: Level Of Assist: Supervised with No Equipment Needed       Plan of Care: Patient with no further skilled PT needs in the acute care setting at this time  Discharge Recommendations: Equipment: No Equipment Needed. Post-acute therapy: Cardiac Rehab Phase II     See \"Rehab Therapy-Acute\" Patient Summary Report for complete documentation.       "

## 2019-09-28 LAB — GLUCOSE BLD-MCNC: 179 MG/DL (ref 65–99)

## 2019-09-30 ENCOUNTER — TELEPHONE (OUTPATIENT)
Dept: CARDIOLOGY | Facility: MEDICAL CENTER | Age: 49
End: 2019-09-30

## 2019-10-01 ENCOUNTER — PATIENT OUTREACH (OUTPATIENT)
Dept: HEALTH INFORMATION MANAGEMENT | Facility: OTHER | Age: 49
End: 2019-10-01

## 2019-10-02 ENCOUNTER — OFFICE VISIT (OUTPATIENT)
Dept: MEDICAL GROUP | Facility: MEDICAL CENTER | Age: 49
End: 2019-10-02
Payer: COMMERCIAL

## 2019-10-02 VITALS
DIASTOLIC BLOOD PRESSURE: 72 MMHG | TEMPERATURE: 97.9 F | WEIGHT: 229.8 LBS | SYSTOLIC BLOOD PRESSURE: 108 MMHG | OXYGEN SATURATION: 97 % | HEIGHT: 72 IN | HEART RATE: 98 BPM | BODY MASS INDEX: 31.13 KG/M2

## 2019-10-02 DIAGNOSIS — I95.1 ORTHOSTATIC HYPOTENSION: ICD-10-CM

## 2019-10-02 DIAGNOSIS — Z09 HOSPITAL DISCHARGE FOLLOW-UP: ICD-10-CM

## 2019-10-02 DIAGNOSIS — E11.8 TYPE 2 DIABETES MELLITUS WITH COMPLICATION, WITH LONG-TERM CURRENT USE OF INSULIN (HCC): ICD-10-CM

## 2019-10-02 DIAGNOSIS — I10 ESSENTIAL HYPERTENSION: ICD-10-CM

## 2019-10-02 DIAGNOSIS — Z79.4 TYPE 2 DIABETES MELLITUS WITH COMPLICATION, WITH LONG-TERM CURRENT USE OF INSULIN (HCC): ICD-10-CM

## 2019-10-02 DIAGNOSIS — I25.10 CORONARY ARTERY DISEASE INVOLVING NATIVE CORONARY ARTERY OF NATIVE HEART WITHOUT ANGINA PECTORIS: ICD-10-CM

## 2019-10-02 DIAGNOSIS — Z95.820 S/P ANGIOPLASTY WITH STENT: ICD-10-CM

## 2019-10-02 PROCEDURE — 99204 OFFICE O/P NEW MOD 45 MIN: CPT | Performed by: FAMILY MEDICINE

## 2019-10-02 ASSESSMENT — PATIENT HEALTH QUESTIONNAIRE - PHQ9: CLINICAL INTERPRETATION OF PHQ2 SCORE: 0

## 2019-10-02 NOTE — PROGRESS NOTES
Subjective:     Beni Moore is a 49 y.o. male who presents for Hospital Follow-up.  Chart and discharge summary reviewed.   Date of discharge 9/27/2019.  48- hour post discharge RN call completed on 10/1/2019 and documented in the medical record by Sarah Cleaning RN:  POST DISCHARGE CALL:  Discharge Date:9/27/2019   Date of Outreach Call: 10/1/2019 10:50 AM  Now that you're home, how are you doing? Good  Comment:Pt denies any current problems. States he is  monitoring B/P. Reports reading today 115/80.  States B/P  does drop when he gets up. Reports he is monitoring and  taking plenty of fuids.  Do you have questions about your medications? No    Did you fill your medications? Yes    Do you have a follow-up appointment scheduled?Yes  Comment:Post discharge clinic 10/2/19    Discharging Department: Telemetry 7    Number of Attempts: 2  Current or previous attempts completed within two business days of discharge? Yes  Provided education regarding treatment plan, medication, self-management, ADLs? Yes  Has patient completed Advance Directive? If yes, advise them to bring to appointment. No  Care Manager phone number provided? Yes  Is there anything else I can help you with? No         HPI: The patient is a 49-year-old white male with a history of type 2 diabetes mellitus, insulin using and currently controlled, diabetic enteropathy, diabetic retinopathy.  He presented with mild to moderate chest pain.  He had a normal stress test and was going to be sent home but insisted on an angiogram.  He underwent a drug-eluting stent placement.  After this his hospitalization was complicated by orthostatic hypotension causing a syncopal episode.  The patient was initially started on metoprolol 25 mg twice a day.  Cardiology recommended changing the metoprolol to carvedilol 25 mg twice a day.  Blood pressure remained high and hospitalist added amlodipine 10 mg daily along with hydrochlorothiazide.  He was continued on  lisinopril 40 mg daily.  Orthostatic hypotension ensued and eventually amlodipine and HCTZ were discontinued and his carvedilol was decreased, first down to 12.5 mg twice daily and then to 6.25 mg twice daily.  Lisinopril was decreased to 40 mg daily.  It appears that the metoprolol was discontinued after 9/24 but it was never taken off of his med list.  Therefore he was discharged on both the metoprolol and the carvedilol, as well as lisinopril 20 mg daily.    His diabetes is well controlled on a combination of Lantus, glimepiride, and Trulicity.  Hemoglobin A1c was 6.6.    The patient has struggled with chronic diarrhea for many months is and is under the care of Dr. Orona, gastroenterologist.  He was recently started on Viberzi.    Since returning home, patient reports feeling very lightheaded with low blood pressure.  He says that today he did not take the lisinopril but did take both the metoprolol and carvedilol.     The patient has questions regarding hospitalization or discharge: Many questions were answered.  The patient has a little weakness; denies difficulty taking care of self at home.  Patient reports taking medications as instructed.    Current Outpatient Medications   Medication Sig Dispense Refill   • aspirin 81 MG EC tablet Take 1 Tab by mouth every day. 90 Tab 0   • lisinopril (PRINIVIL, ZESTRIL) 40 MG tablet Take 0.5 Tabs by mouth every day. 90 Tab 0   • atorvastatin (LIPITOR) 80 MG tablet Take 1 Tab by mouth every bedtime. 90 Tab 0   • carvedilol (COREG) 6.25 MG Tab Take 1 Tab by mouth 2 times a day, with meals. 180 Tab 0   • ticagrelor (BRILINTA) 90 MG Tab tablet Take 1 Tab by mouth 2 Times a Day. 60 Tab 0   • Eluxadoline (VIBERZI) 75 MG Tab Take 1 Tab by mouth 2 Times a Day.     • metoprolol (LOPRESSOR) 25 MG Tab Take 1 Tab by mouth 2 times a day. 60 Tab 3   • aspirin (ASA) 81 MG Chew Tab chewable tablet Take 1 Tab by mouth every day. 100 Tab 3   • insulin glargine (LANTUS) 100 UNIT/ML  Solution Inject 10 Units as instructed every evening.     • glimepiride (AMARYL) 4 MG Tab Take 4 mg by mouth every day.  0   • TRULICITY 1.5 MG/0.5ML Solution Pen-injector 1.5 mg by Injection route every 7 days.     • omeprazole (PRILOSEC) 20 MG delayed-release capsule Take 20 mg by mouth every day.       No current facility-administered medications for this visit.        Patient Active Problem List    Diagnosis Date Noted   • Status post amputation of great toe, right ([facility]) 06/30/2018     Priority: High   • Functional diarrhea 09/27/2019   • Type 2 diabetes mellitus with complication, with long-term current use of insulin ([facility]) 09/21/2019   • Essential hypertension 09/21/2019   • NSTEMI (non-ST elevated myocardial infarction) ([facility]) 09/21/2019   • AXEL (acute kidney injury) ([facility]) 09/21/2019   • Chronic fatigue 09/21/2019   • S/P transmetatarsal amputation of foot, right ([facility]) 07/05/2018   • Normocytic anemia 06/30/2018   • Type 2 diabetes mellitus with hyperglycemia, without long-term current use of insulin ([facility]) 06/30/2018   • Overweight (BMI 25.0-29.9) 06/30/2018       Allergies:   Patient has no known allergies.    Social History:  Social History     Tobacco Use   • Smoking status: Never Smoker   • Smokeless tobacco: Never Used   Substance Use Topics   • Alcohol use: No        Family History:  Family History   Problem Relation Age of Onset   • No Known Problems Mother    • Diabetes Father    • Heart Disease Father        Past Medical History:   Diagnosis Date   • Diabetes ([facility])    • Hypertension        Surgical History  Past Surgical History:   Procedure Laterality Date   • TOE AMPUTATION Right 7/2/2018    Procedure: Transmetatarsal amputation;  Surgeon: Ravi Bernardo M.D.;  Location: SURGERY [facility];  Service: Orthopedics   • ACHILLES TENDON REPAIR Right 7/2/2018    Procedure: ACHILLES LENGTHENING;  Surgeon: Ravi Bernardo M.D.;  Location: SURGERY [facility];  Service: Orthopedics   • IRRIGATION  & DEBRIDEMENT ORTHO Right 7/2/2018    Procedure: IRRIGATION & DEBRIDEMENT ORTHO;  Surgeon: Ravi Bernardo M.D.;  Location: SURGERY [facility];  Service: Orthopedics   • OTHER     • OTHER ABDOMINAL SURGERY     • OTHER ORTHOPEDIC SURGERY         ROS:    Constitutional:  Denies fever, chills, night sweats, fatigue or malaise  HENT: Denies head congestion, ear pain or drainage, decreased hearing, sore throat  Eyes: Denies visual changes, eye drainage or redness, eye pain  Neck: Denies pain, swollen glands, decreased range of motion  Lungs: Denies shortness of breath, wheezing, cough  Cardiovascular: Denies chest pain, orthopnea, lower extremity edema, palpitations  Abdominal: Denies abdominal pain, change in bladder habits, nausea or vomiting.  Chronic diarrhea as noted above.  Musculoskeletal: Denies back pain, joint pains, decreased range of motion  Endocrine: Denies unexplained weight changes, heat or cold intolerance, hair loss, polyuria or polydipsia  Neurological: Denies current dizziness, headache, confusion, focal weakness or numbness, memory loss  Psychiatric: Denies depression, anxiety, insomnia       Objective:     /72 (BP Location: Left arm, Patient Position: Sitting, BP Cuff Size: Adult)   Pulse 98   Temp 36.6 °C (97.9 °F) (Temporal)   Ht 1.829 m (6')   Wt 104.2 kg (229 lb 12.8 oz)   SpO2 97%      Physical Exam:    GEN:  Alert, oriented, in no distress  HEENT:  PERRLA, EOMI  LUNGS:  Clear to auscultation without rales, rhonci, or wheezes.  CV:  Heart RRR without murmurs or S3 or S4  EXT:  Without cyanosis, clubbing, or edema.  NEURO:  Cranial nerves II through XII intact.  Motor function and sensation intact.  SKIN: No rashes or suspicious lesions.  PSYCH:  Behavior is appropriate.      Assessment and Plan:     1. Hospital follow-up:   Hospitalization and results reviewed with patient. High risk conditions requiring teaching or care coordination were identified and addressed.The patient  demonstrate understanding of admission and underlying conditions. The patient understands discharge instructions and when to seek medical attention. Medications reviewed including instructions regarding high risk medications, dosing and side effects.    The patient is able to safely adhere to ADL/IADL, treatment and medication regimen, self-manage of high-risk conditions? Yes  The patient requires physical therapy/home health/DME referral? No   The patient requires referral to care coordination/behavioral health/social work?  No   Patient requires referral for pharmacy consult? No     2. Coronary artery disease involving native coronary artery of native heart without angina pectoris  -Cardiology follow-up on 10/10/2019    3. S/P angioplasty with stent  -Started on Brilinta, carvedilol    4. Orthostatic hypotension  -This appears to have been secondary to overtreatment for hypertension.  Also, he was inadvertently discharged on 2 beta-blockers despite the clear recommendation from cardiology that he be switched from 1 (metoprolol) to another (carvedilol).  He is instructed to discontinue the metoprolol and restart the lisinopril at 20 mg daily.  He will continue the carvedilol.    5. Essential hypertension  -He will continue to monitor his blood pressure.  If it starts to run high on current regimen then carvedilol could be increased.  I advised him to contact us if his blood pressure runs too high or too low.  Fortunately he has a cardiology follow-up next week.    6. Type 2 diabetes mellitus with complication, with long-term current use of insulin ([facility])  -Well-controlled on current regimen.        Medication Reconciliation  Medication list at end of encounter:   Current Outpatient Medications   Medication Sig Dispense Refill   • aspirin 81 MG EC tablet Take 1 Tab by mouth every day. 90 Tab 0   • lisinopril (PRINIVIL, ZESTRIL) 40 MG tablet Take 0.5 Tabs by mouth every day. 90 Tab 0   • atorvastatin (LIPITOR) 80 MG  tablet Take 1 Tab by mouth every bedtime. 90 Tab 0   • carvedilol (COREG) 6.25 MG Tab Take 1 Tab by mouth 2 times a day, with meals. 180 Tab 0   • ticagrelor (BRILINTA) 90 MG Tab tablet Take 1 Tab by mouth 2 Times a Day. 60 Tab 0   • Eluxadoline (VIBERZI) 75 MG Tab Take 1 Tab by mouth 2 Times a Day.     • metoprolol (LOPRESSOR) 25 MG Tab Take 1 Tab by mouth 2 times a day. 60 Tab 3   • aspirin (ASA) 81 MG Chew Tab chewable tablet Take 1 Tab by mouth every day. 100 Tab 3   • insulin glargine (LANTUS) 100 UNIT/ML Solution Inject 10 Units as instructed every evening.     • glimepiride (AMARYL) 4 MG Tab Take 4 mg by mouth every day.  0   • TRULICITY 1.5 MG/0.5ML Solution Pen-injector 1.5 mg by Injection route every 7 days.     • omeprazole (PRILOSEC) 20 MG delayed-release capsule Take 20 mg by mouth every day.       No current facility-administered medications for this visit.        Primary care follow-up:      Recommended followup:  with new PCP Dr. Overton  Future Appointments       Provider Department [place]    10/10/2019 8:20 AM Martha Rubio P.A.-C. [facility]     11/18/2019 11:20 AM Marita Overton M.D. [facility]           Patient Instruction  Patient provided with educational material on discharge diagnosis and management of symptoms/red flags. Patient instructed to keep follow-up appointments and to bring written questions and and actual medications to each office visit. Patient instructed to call PCP/specialist with any problems/questions/concerns. Patient verbalizes understanding and has no further questions at this time.    Total of 50 minutes face-to-face time was spent with patient, with greater than 50% of the time spent in counseling on medication management and coordination of care.

## 2019-10-08 NOTE — PROGRESS NOTES
Chief Complaint   Patient presents with   • Chest Pain     Previous patient        Subjective:   Beni Moore is a 49 y.o. male who presents today for follow up after hospitalization with his wife Rosa.     Hospitalized 9/20 for chest pain and shortness of breath, troponins were only mildly elevated, peak 48ng/L.  Seen by Dr. Engel.  He had a chemical nuclear medicine stress test which was normal.  Due to his persistent symptoms he had a left heart cath which showed distal RCA stenosis status post drug-eluting stent.    Since discharge he has been feeling great.  Prior to the hospitalization he has become very fatigued with minimal exertion however this is now completely resolved.  He continues to have difficulties with his blood pressure.  Especially going from sitting to standing he becomes very dizzy and lightheaded.  Last week his PCP reduce his lisinopril to 20 mg.  In the morning and evening his blood pressures are still about 100-105/80s.    Chronic illnesses include type 2 diabetes complicated by right distal foot amputation, diabetic neuropathy, diabetic retinopathy, hypertension, chronic diarrhea.    Denies tobacco use.  He is currently not working and on disability due to chronic diarrhea.  He lives in [place] with his wife.  As far as he knows he does not have any family history of coronary artery disease.  His grandmother had type 1 diabetes.    Past Medical History:   Diagnosis Date   • Diabetes (HCC)    • Hypertension      Past Surgical History:   Procedure Laterality Date   • TOE AMPUTATION Right 7/2/2018    Procedure: Transmetatarsal amputation;  Surgeon: Ravi Bernardo M.D.;  Location: [facility];  Service: Orthopedics   • ACHILLES TENDON REPAIR Right 7/2/2018    Procedure: ACHILLES LENGTHENING;  Surgeon: Ravi Bernardo M.D.;  Location: [facility];  Service: Orthopedics   • IRRIGATION & DEBRIDEMENT ORTHO Right 7/2/2018    Procedure: IRRIGATION & DEBRIDEMENT ORTHO;   Surgeon: Ravi Bernardo M.D.;  Location: SUR[facility]ERY [facility];  Service: Orthopedics   • OTHER     • OTHER ABDOMINAL SURGERY     • OTHER ORTHOPEDIC SURGERY       Family History   Problem Relation Age of Onset   • No Known Problems Mother    • Diabetes Father    • Heart Disease Father      Social History     Socioeconomic History   • Marital status:      Spouse name: Not on file   • Number of children: Not on file   • Years of education: Not on file   • Highest education level: Not on file   Occupational History   • Not on file   Social Needs   • Financial resource strain: Not on file   • Food insecurity:     Worry: Not on file     Inability: Not on file   • Transportation needs:     Medical: Not on file     Non-medical: Not on file   Tobacco Use   • Smoking status: Never Smoker   • Smokeless tobacco: Never Used   Substance and Sexual Activity   • Alcohol use: No   • Drug use: No   • Sexual activity: Not on file   Lifestyle   • Physical activity:     Days per week: Not on file     Minutes per session: Not on file   • Stress: Not on file   Relationships   • Social connections:     Talks on phone: Not on file     Gets together: Not on file     Attends Yazidi service: Not on file     Active member of club or organization: Not on file     Attends meetings of clubs or organizations: Not on file     Relationship status: Not on file   • Intimate partner violence:     Fear of current or ex partner: Not on file     Emotionally abused: Not on file     Physically abused: Not on file     Forced sexual activity: Not on file   Other Topics Concern   • Not on file   Social History Narrative   • Not on file     No Known Allergies  Outpatient Encounter Medications as of 10/10/2019   Medication Sig Dispense Refill   • lisinopril (PRINIVIL) 10 MG Tab Take 1 Tab by mouth every day. 90 Tab 3   • aspirin 81 MG EC tablet Take 1 Tab by mouth every day. 90 Tab 0   • atorvastatin (LIPITOR) 80 MG tablet Take 1 Tab by mouth  every bedtime. 90 Tab 0   • carvedilol (COREG) 6.25 MG Tab Take 1 Tab by mouth 2 times a day, with meals. 180 Tab 0   • ticagrelor (BRILINTA) 90 MG Tab tablet Take 1 Tab by mouth 2 Times a Day. 60 Tab 0   • Eluxadoline (VIBERZI) 75 MG Tab Take 1 Tab by mouth 2 Times a Day.     • insulin glargine (LANTUS) 100 UNIT/ML Solution Inject 10 Units as instructed every evening.     • glimepiride (AMARYL) 4 MG Tab Take 4 mg by mouth every day.  0   • TRULICITY 1.5 MG/0.5ML Solution Pen-injector 1.5 mg by Injection route every 7 days.     • omeprazole (PRILOSEC) 20 MG delayed-release capsule Take 20 mg by mouth every day.     • [DISCONTINUED] lisinopril (PRINIVIL, ZESTRIL) 40 MG tablet Take 0.5 Tabs by mouth every day. 90 Tab 0   • [DISCONTINUED] aspirin (ASA) 81 MG Chew Tab chewable tablet Take 1 Tab by mouth every day. 100 Tab 3     No facility-administered encounter medications on file as of 10/10/2019.      Review of Systems   Constitutional: Negative for chills, fever and weight loss.   HENT: Negative for nosebleeds.    Respiratory: Negative for cough and shortness of breath.    Cardiovascular: Negative for chest pain, palpitations, orthopnea, leg swelling and PND.   Gastrointestinal: Positive for diarrhea. Negative for blood in stool, melena and nausea.   Genitourinary: Negative for hematuria.   Musculoskeletal: Negative for myalgias.   Neurological: Positive for dizziness (LH with position changes).   Psychiatric/Behavioral: Negative for depression. The patient is nervous/anxious (regarding medical conditions).         Objective:   /80 (BP Location: Right arm, Patient Position: Sitting, BP Cuff Size: Adult)   Pulse 88   Ht 1.829 m (6')   Wt 100.7 kg (222 lb)   SpO2 99%   BMI 30.11 kg/m²     Physical Exam   Constitutional: He is oriented to person, place, and time. He appears well-developed and well-nourished. No distress.   HENT:   Head: Normocephalic and atraumatic.   Mouth/Throat: Oropharynx is clear and  moist.   Eyes: Conjunctivae are normal. No scleral icterus.   Neck: No JVD present. Carotid bruit is not present.   Cardiovascular: Normal rate, regular rhythm and intact distal pulses.   No murmur heard.  Pulmonary/Chest: Effort normal and breath sounds normal. No respiratory distress. He has no wheezes. He has no rales.   Abdominal: Soft. Bowel sounds are normal. He exhibits no distension. There is no tenderness.   Musculoskeletal: He exhibits no edema.   Right distal foot ambulation. Bilateral thenar muscle atrophy   Neurological: He is alert and oriented to person, place, and time.   Skin: Skin is warm and dry. He is not diaphoretic.   Cap refill 2 sec bilateral hands   Psychiatric: Judgment normal.   Vitals reviewed.       Echocardiogram:  9/24/19  No prior study is available for comparison.   Technically difficult but otherwise normal study  Left ventricular ejection fraction is visually estimated to be 65%. Grossly   normal regional wall motion. Normal diastolic function.     Cardiac Catheterization:    9/22/19  PROCEDURES:  1.  Left heart catheterization.  2.  Coronary angiography.  3.  PTCA/stent placement of the distal right coronary artery.  4.  Left ventriculogram.  5.  Monitor of conscious sedation.     PREPROCEDURE DIAGNOSES:  1.  Chest pain.  2.  Abnormal myocardial perfusion study.     POSTPROCEDURE DIAGNOSES:  1.  Coronary artery disease with high-grade distal right coronary artery   stenosis, nonobstructive proximal left anterior descending artery and mid to   distal left anterior descending artery stenosis.  2.  Successful percutaneous transluminal coronary angioplasty/stent placement   of the distal right coronary artery with 2.5x20 mm Synergy drug-eluting stent.  3.  Normal left ventricular systolic function with ejection fraction of 65%.  4.  Elevated left ventricular end-diastolic pressure.     Stress Test:    9/21/19  Normal left ventricular size, ejection fraction, and wall motion.   No  reversible defects that would indicate ischemia.    Non-reversible defect noted could be due to artifact or infarct.    Assessment:     1. Coronary artery disease involving native coronary artery of native heart without angina pectoris     2. NSTEMI (non-ST elevated myocardial infarction) ([facility])     3. Type 2 diabetes mellitus with diabetic peripheral angiopathy without gangrene, with long-term current use of insulin ([facility])  REFERRAL TO ENDOCRINOLOGY   4. Essential hypertension     5. S/P right coronary artery (RCA) stent placement      9/22/19       Medical Decision Making:  Today's Assessment / Status / Plan:   Coronary artery disease status post ARTURO to distal RCA  -40-50% nonobstructive stenosis to the proximal LAD and RCA  -DAPT: Aspirin 81 mg, Brilinta 90 mg twice daily.  Discussed importance of continuing dual antiplatelet therapy.  He is not having any difficulties obtaining the medication.  Will call if he does.  - ECHO showed  Ef 65%  - atorvastatin 80mg qd  - coreg 6.25mg BID      Hypertension  -Previously uncontrolled on lisinopril 40.  Since hospitalization he has had orthostatic hypotension.  He makes every attempt to stay hydrated given his chronic diarrhea.  No left or light abnormalities.  -I will reduce his lisinopril to 10 mg, continue Coreg 6.25 mg twice daily.  He will call in 2 weeks if his blood pressure and symptoms are not improved    Diabetes mellitus type 2 with insulin use  -Complications include right foot amputation, diabetic neuropathy, retinopathy.  -Given that he has significant complications from his diabetes I referred him to endocrinology.  He does seem to have good glucose control currently  -He is on a GLP-1 RA which is beneficial for cardiovascular disease.    Collaborating MD: Dr. Dacosta

## 2019-10-10 ENCOUNTER — OFFICE VISIT (OUTPATIENT)
Dept: CARDIOLOGY | Facility: MEDICAL CENTER | Age: 49
End: 2019-10-10
Payer: COMMERCIAL

## 2019-10-10 VITALS
SYSTOLIC BLOOD PRESSURE: 120 MMHG | BODY MASS INDEX: 30.07 KG/M2 | OXYGEN SATURATION: 99 % | WEIGHT: 222 LBS | HEIGHT: 72 IN | HEART RATE: 88 BPM | DIASTOLIC BLOOD PRESSURE: 80 MMHG

## 2019-10-10 DIAGNOSIS — E11.51 TYPE 2 DIABETES MELLITUS WITH DIABETIC PERIPHERAL ANGIOPATHY WITHOUT GANGRENE, WITH LONG-TERM CURRENT USE OF INSULIN (HCC): ICD-10-CM

## 2019-10-10 DIAGNOSIS — I21.4 NSTEMI (NON-ST ELEVATED MYOCARDIAL INFARCTION) (HCC): ICD-10-CM

## 2019-10-10 DIAGNOSIS — Z79.4 TYPE 2 DIABETES MELLITUS WITH DIABETIC PERIPHERAL ANGIOPATHY WITHOUT GANGRENE, WITH LONG-TERM CURRENT USE OF INSULIN (HCC): ICD-10-CM

## 2019-10-10 DIAGNOSIS — I25.10 CORONARY ARTERY DISEASE INVOLVING NATIVE CORONARY ARTERY OF NATIVE HEART WITHOUT ANGINA PECTORIS: ICD-10-CM

## 2019-10-10 DIAGNOSIS — I10 ESSENTIAL HYPERTENSION: ICD-10-CM

## 2019-10-10 DIAGNOSIS — Z95.5 S/P RIGHT CORONARY ARTERY (RCA) STENT PLACEMENT: ICD-10-CM

## 2019-10-10 PROCEDURE — 99214 OFFICE O/P EST MOD 30 MIN: CPT | Performed by: PHYSICIAN ASSISTANT

## 2019-10-10 RX ORDER — LISINOPRIL 10 MG/1
10 TABLET ORAL DAILY
Qty: 90 TAB | Refills: 3 | Status: SHIPPED | OUTPATIENT
Start: 2019-10-10 | End: 2019-12-05 | Stop reason: SDUPTHER

## 2019-10-10 ASSESSMENT — ENCOUNTER SYMPTOMS
DEPRESSION: 0
DIZZINESS: 1
CHILLS: 0
SHORTNESS OF BREATH: 0
BLOOD IN STOOL: 0
FEVER: 0
ORTHOPNEA: 0
PND: 0
COUGH: 0
WEIGHT LOSS: 0
DIARRHEA: 1
PALPITATIONS: 0
NAUSEA: 0
NERVOUS/ANXIOUS: 1
MYALGIAS: 0

## 2019-10-23 DIAGNOSIS — I25.10 ASCVD (ARTERIOSCLEROTIC CARDIOVASCULAR DISEASE): ICD-10-CM

## 2019-11-18 ENCOUNTER — APPOINTMENT (OUTPATIENT)
Dept: MEDICAL GROUP | Facility: LAB | Age: 49
End: 2019-11-18
Payer: COMMERCIAL

## 2019-11-20 ENCOUNTER — OFFICE VISIT (OUTPATIENT)
Dept: MEDICAL GROUP | Facility: LAB | Age: 49
End: 2019-11-20
Payer: COMMERCIAL

## 2019-11-20 VITALS
OXYGEN SATURATION: 98 % | HEART RATE: 101 BPM | HEIGHT: 72 IN | TEMPERATURE: 98.6 F | BODY MASS INDEX: 31.26 KG/M2 | DIASTOLIC BLOOD PRESSURE: 90 MMHG | SYSTOLIC BLOOD PRESSURE: 130 MMHG | WEIGHT: 230.8 LBS

## 2019-11-20 DIAGNOSIS — Z79.4 TYPE 2 DIABETES MELLITUS WITH COMPLICATION, WITH LONG-TERM CURRENT USE OF INSULIN (HCC): ICD-10-CM

## 2019-11-20 DIAGNOSIS — Z12.11 SCREENING FOR COLORECTAL CANCER: ICD-10-CM

## 2019-11-20 DIAGNOSIS — Z12.12 SCREENING FOR COLORECTAL CANCER: ICD-10-CM

## 2019-11-20 DIAGNOSIS — K21.9 GASTROESOPHAGEAL REFLUX DISEASE WITHOUT ESOPHAGITIS: ICD-10-CM

## 2019-11-20 DIAGNOSIS — E11.8 TYPE 2 DIABETES MELLITUS WITH COMPLICATION, WITH LONG-TERM CURRENT USE OF INSULIN (HCC): ICD-10-CM

## 2019-11-20 DIAGNOSIS — H91.91 HEARING LOSS OF RIGHT EAR, UNSPECIFIED HEARING LOSS TYPE: ICD-10-CM

## 2019-11-20 PROCEDURE — 99214 OFFICE O/P EST MOD 30 MIN: CPT | Performed by: FAMILY MEDICINE

## 2019-11-20 RX ORDER — GLIMEPIRIDE 4 MG/1
4 TABLET ORAL DAILY
Qty: 30 TAB | Refills: 0 | Status: SHIPPED | OUTPATIENT
Start: 2019-11-20 | End: 2019-12-14

## 2019-11-20 RX ORDER — DULAGLUTIDE 1.5 MG/.5ML
1.5 INJECTION, SOLUTION SUBCUTANEOUS
Qty: 30 PEN | Refills: 0 | Status: SHIPPED | OUTPATIENT
Start: 2019-11-20 | End: 2019-12-29 | Stop reason: SDUPTHER

## 2019-11-20 RX ORDER — GABAPENTIN 100 MG/1
100 CAPSULE ORAL
Qty: 90 CAP | Refills: 3 | Status: SHIPPED | OUTPATIENT
Start: 2019-11-20 | End: 2019-11-20 | Stop reason: SDUPTHER

## 2019-11-20 RX ORDER — GABAPENTIN 100 MG/1
100 CAPSULE ORAL
Qty: 90 CAP | Refills: 3 | Status: SHIPPED | OUTPATIENT
Start: 2019-11-20 | End: 2019-12-05 | Stop reason: SDUPTHER

## 2019-11-20 RX ORDER — DULAGLUTIDE 1.5 MG/.5ML
1.5 INJECTION, SOLUTION SUBCUTANEOUS
Qty: 30 PEN | Refills: 0 | Status: SHIPPED | OUTPATIENT
Start: 2019-11-20 | End: 2019-11-20 | Stop reason: SDUPTHER

## 2019-11-20 RX ORDER — GLIMEPIRIDE 4 MG/1
4 TABLET ORAL DAILY
Qty: 30 TAB | Refills: 0 | Status: SHIPPED | OUTPATIENT
Start: 2019-11-20 | End: 2019-11-20 | Stop reason: SDUPTHER

## 2019-11-21 RX ORDER — INSULIN GLARGINE 100 [IU]/ML
10 INJECTION, SOLUTION SUBCUTANEOUS EVERY EVENING
Qty: 3 ML | Refills: 10 | Status: SHIPPED | OUTPATIENT
Start: 2019-11-21 | End: 2019-11-25 | Stop reason: SDUPTHER

## 2019-11-21 NOTE — PROGRESS NOTES
Subjective:     CC:  Diagnoses of Screening for colorectal cancer, Type 2 diabetes mellitus with complication, with long-term current use of insulin (HCC), Gastroesophageal reflux disease without esophagitis, and Hearing loss of right ear, unspecified hearing loss type were pertinent to this visit.    HISTORY OF THE PRESENT ILLNESS: Patient is a 49 y.o. male. This pleasant patient is here today     Hospital follow-up:  Patient was seen for chest pain in September.  He is status post stent placement and cardiac catheterization.  He is currently on carvedilol, lisinopril, Brilinta, and aspirin.  He is followed by cardiology and is stable on this.  He also was found to have chronic intermittent diarrhea while inpatient.  He was sent to GI from there and is currently on Viberzi.  He still has 16-20 bouts of diarrhea daily.  He is being seen in [facility] as well for this.  His diabetes was found to be stable inpatient and his last hemoglobin A1c was within range.  He is currently on Trulicity, glimepiride, and Lantus 10 units at nighttime.  He is followed by an ophthalmologist and needs a podiatrist as he is status post transmetatarsal amputation of the right foot.  He also has diabetic retinopathy.  He complains today of neuropathy in his feet and hands.      No problem-specific Assessment & Plan notes found for this encounter.    Allergies: Patient has no known allergies.    Current Outpatient Medications Ordered in Epic   Medication Sig Dispense Refill   • TRULICITY 1.5 MG/0.5ML Solution Pen-injector 1.5 mg by Injection route every 7 days for 90 days. 30 PEN 0   • insulin glargine (LANTUS SOLOSTAR) 100 UNIT/ML Solution Pen-injector injection Inject 10 Units as instructed every evening for 90 days. 9 mL 0   • glimepiride (AMARYL) 4 MG Tab Take 1 Tab by mouth every day. 30 Tab 0   • gabapentin (NEURONTIN) 100 MG Cap Take 1 Cap by mouth every bedtime. 90 Cap 3   • ticagrelor (BRILINTA) 90 MG Tab tablet Take 1 Tab by mouth  2 Times a Day. 180 Tab 1   • lisinopril (PRINIVIL) 10 MG Tab Take 1 Tab by mouth every day. 90 Tab 3   • aspirin 81 MG EC tablet Take 1 Tab by mouth every day. 90 Tab 0   • atorvastatin (LIPITOR) 80 MG tablet Take 1 Tab by mouth every bedtime. 90 Tab 0   • carvedilol (COREG) 6.25 MG Tab Take 1 Tab by mouth 2 times a day, with meals. 180 Tab 0   • Eluxadoline (VIBERZI) 75 MG Tab Take 1 Tab by mouth 2 Times a Day.     • omeprazole (PRILOSEC) 20 MG delayed-release capsule Take 20 mg by mouth every day.       No current [facility]-ordered facility-administered medications on file.        Past Medical History:   Diagnosis Date   • Diabetes (HCC)    • Hypertension        Past Surgical History:   Procedure Laterality Date   • TOE AMPUTATION Right 7/2/2018    Procedure: Transmetatarsal amputation;  Surgeon: Ravi Bernardo M.D.;  Location: [facility];  Service: Orthopedics   • ACHILLES TENDON REPAIR Right 7/2/2018    Procedure: ACHILLES LENGTHENING;  Surgeon: Ravi Bernardo M.D.;  Location: [facility];  Service: Orthopedics   • IRRIGATION & DEBRIDEMENT ORTHO Right 7/2/2018    Procedure: IRRIGATION & DEBRIDEMENT ORTHO;  Surgeon: Ravi Bernardo M.D.;  Location: [facility];  Service: Orthopedics   • OTHER     • OTHER ABDOMINAL SURGERY     • OTHER ORTHOPEDIC SURGERY         Social History     Tobacco Use   • Smoking status: Never Smoker   • Smokeless tobacco: Never Used   Substance Use Topics   • Alcohol use: No   • Drug use: No       Social History     Patient does not qualify to have social determinant information on file (likely too young).   Social History Narrative   • Not on file       Family History   Problem Relation Age of Onset   • No Known Problems Mother    • Diabetes Father    • Heart Disease Father        ROS:   Gen: no fevers/chills, no changes in weight  Eyes: no changes in vision  ENT: no sore throat, no hearing loss, no bloody nose  Pulm: no sob, no cough  CV: no chest  pain, no palpitations  GI: no nausea/vomiting, no diarrhea  : no dysuria  MSk: no myalgias  Skin: no rash  Neuro: no headaches, no numbness/tingling  Heme/Lymph: no easy bruising        Objective:     Exam: /90 (BP Location: Left arm, Patient Position: Sitting)   Pulse (!) 101   Temp 37 °C (98.6 °F) (Temporal)   Ht 1.829 m (6')   Wt 104.7 kg (230 lb 12.8 oz)   SpO2 98%  Body mass index is 31.3 kg/m².    General: Normal appearing. No distress.  HEENT: Normocephalic. Eyes conjunctiva clear lids without ptosis, pupils equal  and reactive to light accommodation, ears normal shape and contour, canals are clear bilaterally, tympanic membranes are benign, nasal mucosa benign, oropharynx is without erythema, edema or exudates.   Neck: Supple without JVD or bruit. Thyroid is not enlarged.  Pulmonary: Clear to ausculation.  Normal effort. No rales, ronchi, or wheezing.  Cardiovascular: Regular rate and rhythm without murmur. Carotid and radial pulses are intact and equal bilaterally.  Abdomen: Soft, nontender, nondistended. Normal bowel sounds. Liver and spleen are not palpable  Neurologic: Grossly nonfocal  Lymph: No cervical or supraclavicular lymph nodes are palpable  Skin: Warm and dry.  No obvious lesions.  Musculoskeletal: Normal gait. No extremity cyanosis, clubbing, or edema.  Psych: Normal mood and affect. Alert and oriented x3. Judgment and insight is normal.    Assessment & Plan:   49 y.o. male with the following -      2. Type 2 diabetes mellitus with complication, with long-term current use of insulin (HCC) with neuropathy  Medications refilled, patient up-to-date on screening, will start gabapentin low-dose at this time.  Discussed the benefits and risks of this medication.  Referral to podiatry given  - HEMOGLOBIN A1C; Future  - CBC WITH DIFFERENTIAL; Future  - Comp Metabolic Panel; Future  - Lipid Profile; Future  - TSH WITH REFLEX TO FT4; Future  - MICROALBUMIN CREAT RATIO URINE; Future  -  REFERRAL TO PODIATRY    3.  Intermittent diarrhea  Continue surveillance with GI    4. Hearing loss of right ear, unspecified hearing loss type  Hearing loss on exam, will have cardiology review.  Possibly secondary to diabetes issues.  - REFERRAL TO AUDIOLOGY    5.  Coronary artery disease  Continue medications and continue surveillance with cardiology    Please note that this dictation was created using voice recognition software. I have made every reasonable attempt to correct obvious errors, but I expect that there are errors of grammar and possibly content that I did not discover before finalizing the note.

## 2019-11-21 NOTE — TELEPHONE ENCOUNTER
Was the patient seen in the last year in this department? Yes 11/20/2019    Does patient have an active prescription for medications requested? No     Received Request Via: Pharmacy

## 2019-11-22 NOTE — TELEPHONE ENCOUNTER
Pt stated he uses the vial not pens.   insulin glargine (LANTUS SOLOSTAR) 100 UNIT. Can you send in a new RX

## 2019-11-25 RX ORDER — INSULIN GLARGINE 100 [IU]/ML
10 INJECTION, SOLUTION SUBCUTANEOUS EVERY EVENING
Qty: 3 ML | Refills: 10 | Status: SHIPPED | OUTPATIENT
Start: 2019-11-25 | End: 2019-12-29 | Stop reason: SDUPTHER

## 2019-12-05 RX ORDER — LISINOPRIL 10 MG/1
10 TABLET ORAL DAILY
Qty: 90 TAB | Refills: 3 | Status: SHIPPED | OUTPATIENT
Start: 2019-12-05 | End: 2019-12-16 | Stop reason: SDUPTHER

## 2019-12-05 RX ORDER — GABAPENTIN 100 MG/1
100 CAPSULE ORAL
Qty: 90 CAP | Refills: 3 | Status: SHIPPED | OUTPATIENT
Start: 2019-12-05 | End: 2020-12-30

## 2019-12-05 RX ORDER — LISINOPRIL 10 MG/1
10 TABLET ORAL DAILY
Qty: 90 TAB | Refills: 3 | OUTPATIENT
Start: 2019-12-05

## 2019-12-05 NOTE — TELEPHONE ENCOUNTER
1. Caller Name: Beni Moore   Call Back Number: [phone] (home)         Patient approves a detailed voicemail message: N\A    Called and spoke to patient he will discuss medication dosage change with his cardiologists, in the mean time he would like a refill of lisinopril 10mg.

## 2019-12-05 NOTE — TELEPHONE ENCOUNTER
Was the patient seen in the last year in this department? Yes  LOV: 11/20/2019    Does patient have an active prescription for medications requested? Yes    Received Request Via: Patient     Patient is requesting a 15 mg tablet of lisinopril.

## 2019-12-16 ENCOUNTER — APPOINTMENT (OUTPATIENT)
Dept: ENDOCRINOLOGY | Facility: MEDICAL CENTER | Age: 49
End: 2019-12-16
Payer: COMMERCIAL

## 2019-12-16 DIAGNOSIS — I10 ESSENTIAL HYPERTENSION: ICD-10-CM

## 2019-12-16 DIAGNOSIS — I21.4 NSTEMI (NON-ST ELEVATED MYOCARDIAL INFARCTION) (HCC): ICD-10-CM

## 2019-12-16 DIAGNOSIS — E78.5 HYPERLIPIDEMIA, UNSPECIFIED HYPERLIPIDEMIA TYPE: ICD-10-CM

## 2019-12-16 RX ORDER — LISINOPRIL 10 MG/1
10 TABLET ORAL DAILY
Qty: 90 TAB | Refills: 3 | Status: SHIPPED | OUTPATIENT
Start: 2019-12-16 | End: 2020-02-20 | Stop reason: SDUPTHER

## 2019-12-16 RX ORDER — GLIMEPIRIDE 4 MG/1
TABLET ORAL
Qty: 30 TAB | Refills: 3 | Status: SHIPPED | OUTPATIENT
Start: 2019-12-16 | End: 2020-04-07

## 2019-12-18 RX ORDER — ASPIRIN 81 MG/1
81 TABLET ORAL DAILY
Qty: 90 TAB | Refills: 3 | Status: SHIPPED | OUTPATIENT
Start: 2019-12-18 | End: 2020-12-21

## 2019-12-18 RX ORDER — ATORVASTATIN CALCIUM 80 MG/1
80 TABLET, FILM COATED ORAL
Qty: 90 TAB | Refills: 3 | Status: SHIPPED | OUTPATIENT
Start: 2019-12-18 | End: 2020-12-01

## 2019-12-18 RX ORDER — CARVEDILOL 6.25 MG/1
6.25 TABLET ORAL 2 TIMES DAILY WITH MEALS
Qty: 180 TAB | Refills: 3 | Status: SHIPPED | OUTPATIENT
Start: 2019-12-18 | End: 2020-12-01

## 2019-12-23 ENCOUNTER — TELEPHONE (OUTPATIENT)
Dept: MEDICAL GROUP | Facility: LAB | Age: 49
End: 2019-12-23

## 2019-12-23 DIAGNOSIS — Z01.10 AUDITORY ACUITY EVALUATION: ICD-10-CM

## 2019-12-23 NOTE — TELEPHONE ENCOUNTER
Pt stated his referral-[facility] were sending over a request for pt to have an MRI on Right ear.

## 2019-12-30 RX ORDER — INSULIN GLARGINE 100 [IU]/ML
10 INJECTION, SOLUTION SUBCUTANEOUS EVERY EVENING
Qty: 3 ML | Refills: 10 | Status: SHIPPED | OUTPATIENT
Start: 2019-12-30 | End: 2020-01-29

## 2019-12-30 RX ORDER — DULAGLUTIDE 1.5 MG/.5ML
1.5 INJECTION, SOLUTION SUBCUTANEOUS
Qty: 30 PEN | Refills: 0 | Status: SHIPPED | OUTPATIENT
Start: 2019-12-30 | End: 2020-01-03 | Stop reason: SDUPTHER

## 2019-12-30 NOTE — TELEPHONE ENCOUNTER
Was the patient seen in the last year in this department? Yes 11/20/2019    Does patient have an active prescription for medications requested? Yes    Received Request Via: Patient     insulin glargine (LANTUS) 100 UNIT/ML Solution    TRULICITY 1.5 MG/0.5ML Solution Pen-injector

## 2020-01-02 ENCOUNTER — HOSPITAL ENCOUNTER (OUTPATIENT)
Dept: LAB | Facility: MEDICAL CENTER | Age: 50
End: 2020-01-02
Attending: FAMILY MEDICINE
Payer: COMMERCIAL

## 2020-01-02 DIAGNOSIS — E11.8 TYPE 2 DIABETES MELLITUS WITH COMPLICATION, WITH LONG-TERM CURRENT USE OF INSULIN (HCC): ICD-10-CM

## 2020-01-02 DIAGNOSIS — Z79.4 TYPE 2 DIABETES MELLITUS WITH COMPLICATION, WITH LONG-TERM CURRENT USE OF INSULIN (HCC): ICD-10-CM

## 2020-01-02 LAB
ALBUMIN SERPL BCP-MCNC: 3.7 G/DL (ref 3.2–4.9)
ALBUMIN/GLOB SERPL: 1.1 G/DL
ALP SERPL-CCNC: 66 U/L (ref 30–99)
ALT SERPL-CCNC: 37 U/L (ref 2–50)
ANION GAP SERPL CALC-SCNC: 7 MMOL/L (ref 0–11.9)
AST SERPL-CCNC: 25 U/L (ref 12–45)
BASOPHILS # BLD AUTO: 0.8 % (ref 0–1.8)
BASOPHILS # BLD: 0.06 K/UL (ref 0–0.12)
BILIRUB SERPL-MCNC: 1.4 MG/DL (ref 0.1–1.5)
BUN SERPL-MCNC: 14 MG/DL (ref 8–22)
CALCIUM SERPL-MCNC: 8.9 MG/DL (ref 8.5–10.5)
CHLORIDE SERPL-SCNC: 105 MMOL/L (ref 96–112)
CHOLEST SERPL-MCNC: 93 MG/DL (ref 100–199)
CO2 SERPL-SCNC: 28 MMOL/L (ref 20–33)
CREAT SERPL-MCNC: 0.94 MG/DL (ref 0.5–1.4)
CREAT UR-MCNC: 119.3 MG/DL
EOSINOPHIL # BLD AUTO: 0.28 K/UL (ref 0–0.51)
EOSINOPHIL NFR BLD: 3.7 % (ref 0–6.9)
ERYTHROCYTE [DISTWIDTH] IN BLOOD BY AUTOMATED COUNT: 41.1 FL (ref 35.9–50)
EST. AVERAGE GLUCOSE BLD GHB EST-MCNC: 140 MG/DL
FASTING STATUS PATIENT QL REPORTED: NORMAL
GLOBULIN SER CALC-MCNC: 3.5 G/DL (ref 1.9–3.5)
GLUCOSE SERPL-MCNC: 125 MG/DL (ref 65–99)
HBA1C MFR BLD: 6.5 % (ref 0–5.6)
HCT VFR BLD AUTO: 43 % (ref 42–52)
HDLC SERPL-MCNC: 34 MG/DL
HGB BLD-MCNC: 14.6 G/DL (ref 14–18)
IMM GRANULOCYTES # BLD AUTO: 0.02 K/UL (ref 0–0.11)
IMM GRANULOCYTES NFR BLD AUTO: 0.3 % (ref 0–0.9)
LDLC SERPL CALC-MCNC: 45 MG/DL
LYMPHOCYTES # BLD AUTO: 2.12 K/UL (ref 1–4.8)
LYMPHOCYTES NFR BLD: 27.8 % (ref 22–41)
MCH RBC QN AUTO: 29.9 PG (ref 27–33)
MCHC RBC AUTO-ENTMCNC: 34 G/DL (ref 33.7–35.3)
MCV RBC AUTO: 88.1 FL (ref 81.4–97.8)
MICROALBUMIN UR-MCNC: 179.6 MG/DL
MICROALBUMIN/CREAT UR: 1505 MG/G (ref 0–30)
MONOCYTES # BLD AUTO: 0.68 K/UL (ref 0–0.85)
MONOCYTES NFR BLD AUTO: 8.9 % (ref 0–13.4)
NEUTROPHILS # BLD AUTO: 4.47 K/UL (ref 1.82–7.42)
NEUTROPHILS NFR BLD: 58.5 % (ref 44–72)
NRBC # BLD AUTO: 0 K/UL
NRBC BLD-RTO: 0 /100 WBC
PLATELET # BLD AUTO: 258 K/UL (ref 164–446)
PMV BLD AUTO: 10.3 FL (ref 9–12.9)
POTASSIUM SERPL-SCNC: 3.8 MMOL/L (ref 3.6–5.5)
PROT SERPL-MCNC: 7.2 G/DL (ref 6–8.2)
RBC # BLD AUTO: 4.88 M/UL (ref 4.7–6.1)
SODIUM SERPL-SCNC: 140 MMOL/L (ref 135–145)
TRIGL SERPL-MCNC: 72 MG/DL (ref 0–149)
TSH SERPL DL<=0.005 MIU/L-ACNC: 0.48 UIU/ML (ref 0.38–5.33)
WBC # BLD AUTO: 7.6 K/UL (ref 4.8–10.8)

## 2020-01-02 PROCEDURE — 84443 ASSAY THYROID STIM HORMONE: CPT

## 2020-01-02 PROCEDURE — 80053 COMPREHEN METABOLIC PANEL: CPT

## 2020-01-02 PROCEDURE — 82043 UR ALBUMIN QUANTITATIVE: CPT

## 2020-01-02 PROCEDURE — 36415 COLL VENOUS BLD VENIPUNCTURE: CPT

## 2020-01-02 PROCEDURE — 85025 COMPLETE CBC W/AUTO DIFF WBC: CPT

## 2020-01-02 PROCEDURE — 80061 LIPID PANEL: CPT

## 2020-01-02 PROCEDURE — 82570 ASSAY OF URINE CREATININE: CPT

## 2020-01-02 PROCEDURE — 83036 HEMOGLOBIN GLYCOSYLATED A1C: CPT

## 2020-01-03 ENCOUNTER — HOSPITAL ENCOUNTER (OUTPATIENT)
Dept: RADIOLOGY | Facility: MEDICAL CENTER | Age: 50
End: 2020-01-03
Attending: FAMILY MEDICINE
Payer: COMMERCIAL

## 2020-01-03 DIAGNOSIS — Z01.10 AUDITORY ACUITY EVALUATION: ICD-10-CM

## 2020-01-03 PROCEDURE — 70553 MRI BRAIN STEM W/O & W/DYE: CPT

## 2020-01-03 PROCEDURE — A9576 INJ PROHANCE MULTIPACK: HCPCS | Performed by: FAMILY MEDICINE

## 2020-01-03 PROCEDURE — 700117 HCHG RX CONTRAST REV CODE 255: Performed by: FAMILY MEDICINE

## 2020-01-03 RX ADMIN — GADOTERIDOL 20 ML: 279.3 INJECTION, SOLUTION INTRAVENOUS at 12:09

## 2020-01-03 NOTE — TELEPHONE ENCOUNTER
Was the patient seen in the last year in this department? Yes  11/20/19  Does patient have an active prescription for medications requested? No     Received Request Via: Pharmacy

## 2020-01-06 RX ORDER — DULAGLUTIDE 1.5 MG/.5ML
1.5 INJECTION, SOLUTION SUBCUTANEOUS
Qty: 30 PEN | Refills: 0 | Status: SHIPPED | OUTPATIENT
Start: 2020-01-06 | End: 2020-10-28 | Stop reason: SDUPTHER

## 2020-01-13 ENCOUNTER — TELEPHONE (OUTPATIENT)
Dept: CARDIOLOGY | Facility: MEDICAL CENTER | Age: 50
End: 2020-01-13

## 2020-01-14 ENCOUNTER — OFFICE VISIT (OUTPATIENT)
Dept: MEDICAL GROUP | Facility: LAB | Age: 50
End: 2020-01-14
Payer: COMMERCIAL

## 2020-01-14 VITALS
DIASTOLIC BLOOD PRESSURE: 78 MMHG | HEIGHT: 72 IN | BODY MASS INDEX: 30.48 KG/M2 | SYSTOLIC BLOOD PRESSURE: 132 MMHG | TEMPERATURE: 98.8 F | WEIGHT: 225 LBS | HEART RATE: 80 BPM

## 2020-01-14 DIAGNOSIS — H90.0 CONDUCTIVE HEARING LOSS, BILATERAL: ICD-10-CM

## 2020-01-14 DIAGNOSIS — Z00.00 WELL ADULT EXAM: ICD-10-CM

## 2020-01-14 PROBLEM — H35.00 RETINOPATHY: Status: ACTIVE | Noted: 2020-01-14

## 2020-01-14 PROCEDURE — 99396 PREV VISIT EST AGE 40-64: CPT | Performed by: FAMILY MEDICINE

## 2020-01-14 ASSESSMENT — PATIENT HEALTH QUESTIONNAIRE - PHQ9: CLINICAL INTERPRETATION OF PHQ2 SCORE: 0

## 2020-01-14 NOTE — PROGRESS NOTES
Subjective:     CC:   Chief Complaint   Patient presents with   • Lab Results     MRI for hearing aids       HPI:   Beni Moore is a 49 y.o. male who presents for an annual exam. He is feeling well and has no complaints.    Last colonoscopy: 2019, every ten  Last Tdap: Done  Hx STDs: No  Recent STD test: No  Qualify for abdominal us screen: Age 63  Qualify for hep c screen: Yes, tattoos at home, declined screening  Regular exercise: Yes   Diet: Balanced diet     He  has a past medical history of Diabetes ([facility]) and Hypertension.  He  has a past surgical history that includes other abdominal surgery; other orthopedic surgery; other; toe amputation (Right, 7/2/2018); achilles tendon repair (Right, 7/2/2018); and irrigation & debridement ortho (Right, 7/2/2018).  Family History   Problem Relation Age of Onset   • No Known Problems Mother    • Diabetes Father    • Heart Disease Father      Social History     Tobacco Use   • Smoking status: Never Smoker   • Smokeless tobacco: Never Used   Substance Use Topics   • Alcohol use: No   • Drug use: No       Patient Active Problem List    Diagnosis Date Noted   • Status post amputation of great toe, right ([facility]) 06/30/2018     Priority: High   • Gastroesophageal reflux disease without esophagitis 11/20/2019   • Coronary artery disease involving native coronary artery of native heart without angina pectoris 10/02/2019   • Functional diarrhea 09/27/2019   • Type 2 diabetes mellitus with complication, with long-term current use of insulin ([facility]) 09/21/2019   • Essential hypertension 09/21/2019   • NSTEMI (non-ST elevated myocardial infarction) ([facility]) 09/21/2019   • AXEL (acute kidney injury) ([facility]) 09/21/2019   • Chronic fatigue 09/21/2019   • S/P transmetatarsal amputation of foot, right ([facility]) 07/05/2018   • Normocytic anemia 06/30/2018   • Type 2 diabetes mellitus with hyperglycemia, without long-term current use of insulin ([facility]) 06/30/2018   • Overweight (BMI 25.0-29.9)  06/30/2018       Current Outpatient Medications   Medication Sig Dispense Refill   • TRULICITY 1.5 MG/0.5ML Solution Pen-injector 1.5 mg by Injection route every 7 days for 90 days. 30 PEN 0   • insulin glargine (LANTUS) 100 UNIT/ML Solution Inject 10 Units as instructed every evening for 30 days. 3 mL 10   • aspirin 81 MG EC tablet Take 1 Tab by mouth every day. 90 Tab 3   • atorvastatin (LIPITOR) 80 MG tablet Take 1 Tab by mouth every bedtime. 90 Tab 3   • carvedilol (COREG) 6.25 MG Tab Take 1 Tab by mouth 2 times a day, with meals. 180 Tab 3   • ticagrelor (BRILINTA) 90 MG Tab tablet Take 1 Tab by mouth 2 Times a Day. 180 Tab 3   • glimepiride (AMARYL) 4 MG Tab TAKE 1 TABLET BY MOUTH EVERY DAY 30 Tab 3   • lisinopril (PRINIVIL) 10 MG Tab Take 1 Tab by mouth every day. 90 Tab 3   • gabapentin (NEURONTIN) 100 MG Cap Take 1 Cap by mouth every bedtime. 90 Cap 3   • insulin glargine (LANTUS SOLOSTAR) 100 UNIT/ML Solution Pen-injector injection Inject 10 Units as instructed every evening for 90 days. 9 mL 0   • Eluxadoline (VIBERZI) 75 MG Tab Take 1 Tab by mouth 2 Times a Day.     • omeprazole (PRILOSEC) 20 MG delayed-release capsule Take 20 mg by mouth every day.       No current facility-administered medications for this visit.     (including changes today)  Allergies: Patient has no known allergies.    Review of Systems   Constitutional: Negative for fever, chills and malaise/fatigue.   HENT: Negative for congestion.    Eyes: Negative for pain.   Respiratory: Negative for cough and shortness of breath.    Cardiovascular: Negative for leg swelling.   Gastrointestinal: Negative for nausea, vomiting, abdominal pain and diarrhea.   Genitourinary: Negative for dysuria and hematuria.   Skin: Negative for rash.   Neurological: Negative for dizziness, focal weakness and headaches.   Endo/Heme/Allergies: Does not bruise/bleed easily.   Psychiatric/Behavioral: Negative for depression.  The patient is not nervous/anxious.       Objective:     /78 (BP Location: Right arm, Patient Position: Sitting, BP Cuff Size: Adult)   Pulse 80   Temp 37.1 °C (98.8 °F) (Temporal)   Ht 1.829 m (6')   Wt 102.1 kg (225 lb)   BMI 30.52 kg/m²   Body mass index is 30.52 kg/m².  Wt Readings from Last 4 Encounters:   01/14/20 102.1 kg (225 lb)   11/20/19 104.7 kg (230 lb 12.8 oz)   10/10/19 100.7 kg (222 lb)   10/02/19 104.2 kg (229 lb 12.8 oz)       Physical Exam:  Constitutional: Well-developed and well-nourished. Not diaphoretic. No distress.   Skin: Skin is warm and dry. No rash noted.  Head: Atraumatic without lesions.  Eyes: Conjunctivae and extraocular motions are normal. Pupils are equal, round, and reactive to light. No scleral icterus.   Ears:  External ears unremarkable. Tympanic membranes clear and intact.  Nose: Nares patent. Septum midline. Turbinates without erythema nor edema. No discharge.   Mouth/Throat: Dentition is good. Tongue normal. Oropharynx is clear and moist. Posterior pharynx without erythema or exudates.  Neck: Supple, trachea midline. Normal range of motion. No thyromegaly present. No lymphadenopathy--cervical or supraclavicular.  Cardiovascular: Regular rate and rhythm, S1 and S2 without murmur, rubs, or gallops.    Chest: Effort normal. Clear to auscultation throughout. No adventitious sounds. No CVA tenderness.  Abdomen: Soft, non tender, and without distention. Active bowel sounds in all four quadrants. No rebound, guarding, masses or HSM.  Extremities: No cyanosis, clubbing, erythema, nor edema. Distal pulses intact and symmetric.   Musculoskeletal: All major joints AROM full in all directions without pain.  Neurological: Alert and oriented x 3  Psychiatric:  Behavior, mood, and affect are appropriate.    Assessment and Plan:     1. Well adult exam  Patient compliant with medications, normal physical exam, labs evaluated, patient surveyed by many specialist.  Up-to-date on preventative care.    2.  Hearing  loss  Patient followed by audiology, recommended MRI however no pathologic issues, will return for hearing aids.    Sees dentist and eye doctor   Labs per orders.  Vaccinations per orders.  Counseling about diet, supplements, exercise, skin care and safe sex.    Follow-up: 6 months    Please note that this dictation was created using voice recognition software. I have made every reasonable attempt to correct obvious errors, but I expect that there are errors of grammar and possibly content that I did not discover before finalizing the note.

## 2020-01-24 ENCOUNTER — HOSPITAL ENCOUNTER (OUTPATIENT)
Facility: MEDICAL CENTER | Age: 50
End: 2020-01-24
Attending: INTERNAL MEDICINE
Payer: COMMERCIAL

## 2020-01-24 PROCEDURE — 82542 COL CHROMOTOGRAPHY QUAL/QUAN: CPT

## 2020-01-24 PROCEDURE — 36415 COLL VENOUS BLD VENIPUNCTURE: CPT

## 2020-02-06 LAB — MISCELLANEOUS LAB RESULT MISCLAB: NORMAL

## 2020-02-20 RX ORDER — LISINOPRIL 10 MG/1
10 TABLET ORAL DAILY
Qty: 90 TAB | Refills: 3 | Status: SHIPPED | OUTPATIENT
Start: 2020-02-20 | End: 2021-01-04 | Stop reason: SDUPTHER

## 2020-02-20 NOTE — TELEPHONE ENCOUNTER
Received request via: Pharmacy    Was the patient seen in the last year in this department? Yes  1/14/20  Does the patient have an active prescription (recently filled or refills available) for medication(s) requested? No

## 2020-04-02 ENCOUNTER — OFFICE VISIT (OUTPATIENT)
Dept: CARDIOLOGY | Facility: MEDICAL CENTER | Age: 50
End: 2020-04-02
Payer: COMMERCIAL

## 2020-04-02 VITALS
DIASTOLIC BLOOD PRESSURE: 98 MMHG | BODY MASS INDEX: 30.48 KG/M2 | HEIGHT: 72 IN | WEIGHT: 225 LBS | HEART RATE: 94 BPM | SYSTOLIC BLOOD PRESSURE: 140 MMHG

## 2020-04-02 DIAGNOSIS — R53.82 CHRONIC FATIGUE: ICD-10-CM

## 2020-04-02 DIAGNOSIS — H90.0 CONDUCTIVE HEARING LOSS, BILATERAL: ICD-10-CM

## 2020-04-02 DIAGNOSIS — E11.8 TYPE 2 DIABETES MELLITUS WITH COMPLICATION, WITH LONG-TERM CURRENT USE OF INSULIN (HCC): ICD-10-CM

## 2020-04-02 DIAGNOSIS — N17.9 AKI (ACUTE KIDNEY INJURY) (HCC): ICD-10-CM

## 2020-04-02 DIAGNOSIS — I10 ESSENTIAL HYPERTENSION: ICD-10-CM

## 2020-04-02 DIAGNOSIS — I21.4 NSTEMI (NON-ST ELEVATED MYOCARDIAL INFARCTION) (HCC): ICD-10-CM

## 2020-04-02 DIAGNOSIS — Z89.411 STATUS POST AMPUTATION OF GREAT TOE, RIGHT (HCC): ICD-10-CM

## 2020-04-02 DIAGNOSIS — Z89.431 S/P TRANSMETATARSAL AMPUTATION OF FOOT, RIGHT (HCC): ICD-10-CM

## 2020-04-02 DIAGNOSIS — I25.10 CORONARY ARTERY DISEASE INVOLVING NATIVE CORONARY ARTERY OF NATIVE HEART WITHOUT ANGINA PECTORIS: ICD-10-CM

## 2020-04-02 DIAGNOSIS — Z79.4 TYPE 2 DIABETES MELLITUS WITH COMPLICATION, WITH LONG-TERM CURRENT USE OF INSULIN (HCC): ICD-10-CM

## 2020-04-02 DIAGNOSIS — E11.65 TYPE 2 DIABETES MELLITUS WITH HYPERGLYCEMIA, WITHOUT LONG-TERM CURRENT USE OF INSULIN (HCC): ICD-10-CM

## 2020-04-02 PROCEDURE — 99214 OFFICE O/P EST MOD 30 MIN: CPT | Mod: 95,CR | Performed by: INTERNAL MEDICINE

## 2020-04-02 RX ORDER — INSULIN GLARGINE 100 [IU]/ML
INJECTION, SOLUTION SUBCUTANEOUS
COMMUNITY
Start: 2019-10-23 | End: 2020-11-02 | Stop reason: SDUPTHER

## 2020-04-02 RX ORDER — AMITRIPTYLINE HYDROCHLORIDE 50 MG/1
TABLET, FILM COATED ORAL
COMMUNITY
Start: 2020-03-13 | End: 2020-07-14

## 2020-04-02 RX ORDER — PANCRELIPASE 36000; 180000; 114000 [USP'U]/1; [USP'U]/1; [USP'U]/1
CAPSULE, DELAYED RELEASE PELLETS ORAL
COMMUNITY
Start: 2020-03-31 | End: 2020-07-14

## 2020-04-02 RX ORDER — CHOLESTYRAMINE 4 G/9G
POWDER, FOR SUSPENSION ORAL
COMMUNITY
Start: 2020-03-13 | End: 2021-07-01

## 2020-04-02 ASSESSMENT — ENCOUNTER SYMPTOMS
ORTHOPNEA: 0
RESPIRATORY NEGATIVE: 1
LOSS OF CONSCIOUSNESS: 0
PND: 0
SPUTUM PRODUCTION: 0
CONSTITUTIONAL NEGATIVE: 1
MUSCULOSKELETAL NEGATIVE: 1
BRUISES/BLEEDS EASILY: 0
GASTROINTESTINAL NEGATIVE: 1
SHORTNESS OF BREATH: 0
CARDIOVASCULAR NEGATIVE: 1
WHEEZING: 0
EYES NEGATIVE: 1
COUGH: 0
WEAKNESS: 0
DIZZINESS: 0
STRIDOR: 0
PALPITATIONS: 0
FEVER: 0
CHILLS: 0
CLAUDICATION: 0
SORE THROAT: 0
HEMOPTYSIS: 0
NEUROLOGICAL NEGATIVE: 1

## 2020-04-02 ASSESSMENT — FIBROSIS 4 INDEX: FIB4 SCORE: 0.81

## 2020-04-02 NOTE — PROGRESS NOTES
Chief Complaint   Patient presents with   • Coronary Artery Disease       Subjective:   Beni Moore is a 49 y.o. male who presents today as a follow-up for his CAD status post stent with hypertension hyperlipidemia type 2 diabetes.  Since we last seen he is doing well.  He is tolerating his dual antiplatelet therapy with no issues.  He been compliant with his atorvastatin.  His blood pressure is controlled.  He is getting his diabetes under control.  His average blood sugars are running in the 100-1 20 range in the morning.    Past Medical History:   Diagnosis Date   • Diabetes (HCC)    • Hypertension      Past Surgical History:   Procedure Laterality Date   • TOE AMPUTATION Right 7/2/2018    Procedure: Transmetatarsal amputation;  Surgeon: Ravi Bernardo M.D.;  Location: SURGERY [facility];  Service: Orthopedics   • ACHILLES TENDON REPAIR Right 7/2/2018    Procedure: ACHILLES LENGTHENING;  Surgeon: Ravi Bernardo M.D.;  Location: SURGERY [facility];  Service: Orthopedics   • IRRIGATION & DEBRIDEMENT ORTHO Right 7/2/2018    Procedure: IRRIGATION & DEBRIDEMENT ORTHO;  Surgeon: Ravi Bernardo M.D.;  Location: SURGERY [facility];  Service: Orthopedics   • OTHER     • OTHER ABDOMINAL SURGERY     • OTHER ORTHOPEDIC SURGERY       Family History   Problem Relation Age of Onset   • No Known Problems Mother    • Diabetes Father    • Heart Disease Father      Social History     Socioeconomic History   • Marital status:      Spouse name: Not on file   • Number of children: Not on file   • Years of education: Not on file   • Highest education level: Not on file   Occupational History   • Not on file   Social Needs   • Financial resource strain: Not on file   • Food insecurity     Worry: Not on file     Inability: Not on file   • Transportation needs     Medical: Not on file     Non-medical: Not on file   Tobacco Use   • Smoking status: Never Smoker   • Smokeless tobacco: Never Used   Substance  and Sexual Activity   • Alcohol use: No   • Drug use: No   • Sexual activity: Not on file   Lifestyle   • Physical activity     Days per week: Not on file     Minutes per session: Not on file   • Stress: Not on file   Relationships   • Social connections     Talks on phone: Not on file     Gets together: Not on file     Attends Religion service: Not on file     Active member of club or organization: Not on file     Attends meetings of clubs or organizations: Not on file     Relationship status: Not on file   • Intimate partner violence     Fear of current or ex partner: Not on file     Emotionally abused: Not on file     Physically abused: Not on file     Forced sexual activity: Not on file   Other Topics Concern   • Not on file   Social History Narrative   • Not on file     No Known Allergies  Outpatient Encounter Medications as of 4/2/2020   Medication Sig Dispense Refill   • insulin glargine (LANTUS) 100 UNIT/ML Solution USE AS DIRECTED TO INJECT 10-15 UNITS ONCE A DAY     • amitriptyline (ELAVIL) 50 MG Tab Take  by mouth. Take 1 tablet by mouth every night at bedtime     • CREON 89033 units Cap DR Particles      • cholestyramine (QUESTRAN) 4 GM/DOSE powder MIX 1 SCOOP IN APPLE SAUCE OR JUICE TWICE DAILY.     • lisinopril (PRINIVIL) 10 MG Tab Take 1 Tab by mouth every day. 90 Tab 3   • TRULICITY 1.5 MG/0.5ML Solution Pen-injector 1.5 mg by Injection route every 7 days for 90 days. 30 PEN 0   • aspirin 81 MG EC tablet Take 1 Tab by mouth every day. 90 Tab 3   • atorvastatin (LIPITOR) 80 MG tablet Take 1 Tab by mouth every bedtime. 90 Tab 3   • carvedilol (COREG) 6.25 MG Tab Take 1 Tab by mouth 2 times a day, with meals. 180 Tab 3   • ticagrelor (BRILINTA) 90 MG Tab tablet Take 1 Tab by mouth 2 Times a Day. 180 Tab 3   • glimepiride (AMARYL) 4 MG Tab TAKE 1 TABLET BY MOUTH EVERY DAY 30 Tab 3   • gabapentin (NEURONTIN) 100 MG Cap Take 1 Cap by mouth every bedtime. 90 Cap 3   • omeprazole (PRILOSEC) 20 MG  delayed-release capsule Take 20 mg by mouth every day.     • Eluxadoline (VIBERZI) 75 MG Tab Take 1 Tab by mouth 2 Times a Day.       No facility-administered encounter medications on file as of 4/2/2020.      Review of Systems   Constitutional: Negative.  Negative for chills, fever and malaise/fatigue.   HENT: Negative.  Negative for sore throat.    Eyes: Negative.    Respiratory: Negative.  Negative for cough, hemoptysis, sputum production, shortness of breath, wheezing and stridor.    Cardiovascular: Negative.  Negative for chest pain, palpitations, orthopnea, claudication, leg swelling and PND.   Gastrointestinal: Negative.    Genitourinary: Negative.    Musculoskeletal: Negative.    Skin: Negative.    Neurological: Negative.  Negative for dizziness, loss of consciousness and weakness.   Endo/Heme/Allergies: Negative.  Does not bruise/bleed easily.   All other systems reviewed and are negative.       Objective:   /98 (BP Location: Right arm, Patient Position: Sitting)   Pulse 94   Ht 1.829 m (6')   Wt 102.1 kg (225 lb)   BMI 30.52 kg/m²     Physical Exam   Constitutional: He appears well-developed and well-nourished. No distress.   HENT:   Head: Normocephalic and atraumatic.   Right Ear: External ear normal.   Left Ear: External ear normal.   Nose: Nose normal.   Mouth/Throat: No oropharyngeal exudate.   Eyes: Pupils are equal, round, and reactive to light. Conjunctivae and EOM are normal. Right eye exhibits no discharge. Left eye exhibits no discharge. No scleral icterus.   Neck: Neck supple. No JVD present.   Cardiovascular: Normal rate, regular rhythm and intact distal pulses. Exam reveals no gallop and no friction rub.   No murmur heard.  Pulmonary/Chest: Effort normal. No stridor. No respiratory distress. He has no wheezes. He has no rales. He exhibits no tenderness.   Abdominal: Soft. He exhibits no distension. There is no guarding.   Musculoskeletal: Normal range of motion.         General: No  tenderness, deformity or edema.   Neurological: He is alert. He has normal reflexes. He displays normal reflexes. No cranial nerve deficit. He exhibits normal muscle tone. Coordination normal.   Skin: Skin is warm and dry. No rash noted. He is not diaphoretic. No erythema. No pallor.   Psychiatric: He has a normal mood and affect. His behavior is normal. Judgment and thought content normal.   Nursing note and vitals reviewed.    Echocardiogram: Dated 9/24/2019 personally viewed interpreted by myself showing normal LV systolic function no valvular heart disease.    Stress test: Dated 9/21/2019 personally viewed interpreted by myself showing    Lab Results   Component Value Date/Time    CHOLSTRLTOT 93 (L) 01/02/2020 12:19 PM    LDL 45 01/02/2020 12:19 PM    HDL 34 (A) 01/02/2020 12:19 PM    TRIGLYCERIDE 72 01/02/2020 12:19 PM       Lab Results   Component Value Date/Time    SODIUM 140 01/02/2020 12:14 PM    POTASSIUM 3.8 01/02/2020 12:14 PM    CHLORIDE 105 01/02/2020 12:14 PM    CO2 28 01/02/2020 12:14 PM    GLUCOSE 125 (H) 01/02/2020 12:14 PM    BUN 14 01/02/2020 12:14 PM    CREATININE 0.94 01/02/2020 12:14 PM     Lab Results   Component Value Date/Time    ALKPHOSPHAT 66 01/02/2020 12:14 PM    ASTSGOT 25 01/02/2020 12:14 PM    ALTSGPT 37 01/02/2020 12:14 PM    TBILIRUBIN 1.4 01/02/2020 12:14 PM        Assessment:     1. AXEL (acute kidney injury) ([facility])     2. Chronic fatigue     3. Conductive hearing loss, bilateral     4. Coronary artery disease involving native coronary artery of native heart without angina pectoris  LIPID PANEL   5. Essential hypertension  LIPID PANEL    Comp Metabolic Panel   6. NSTEMI (non-ST elevated myocardial infarction) ([facility])  LIPID PANEL   7. S/P transmetatarsal amputation of foot, right ([facility])  LIPID PANEL    Comp Metabolic Panel   8. Type 2 diabetes mellitus with complication, with long-term current use of insulin ([facility])  LIPID PANEL    Comp Metabolic Panel   9. Type 2 diabetes mellitus with  hyperglycemia, without long-term current use of insulin (HCC)  Comp Metabolic Panel   10. Status post amputation of great toe, right ([facility])         Medical Decision Making:  Today's Assessment / Status / Plan:     49-year-old male on dual antiplatelet therapy for ACS status post stent on 922.  We will see him back in 6 months to stop his Brilinta.  For now he will stay on that.  For his high blood pressure he is to call.  We can down the same medications.  For his hyperlipidemia we will keep him on the atorvastatin.  His diabetes are well controlled.      This encounter was conducted via Zoom .   Verbal consent was obtained. Patient's identity was verified.

## 2020-04-07 RX ORDER — GLIMEPIRIDE 4 MG/1
TABLET ORAL
Qty: 30 TAB | Refills: 3 | Status: SHIPPED | OUTPATIENT
Start: 2020-04-07 | End: 2020-08-03

## 2020-07-14 ENCOUNTER — OFFICE VISIT (OUTPATIENT)
Dept: MEDICAL GROUP | Facility: LAB | Age: 50
End: 2020-07-14
Payer: COMMERCIAL

## 2020-07-14 VITALS
OXYGEN SATURATION: 98 % | DIASTOLIC BLOOD PRESSURE: 88 MMHG | BODY MASS INDEX: 30.34 KG/M2 | HEART RATE: 78 BPM | SYSTOLIC BLOOD PRESSURE: 128 MMHG | HEIGHT: 72 IN | WEIGHT: 224 LBS | TEMPERATURE: 97.9 F

## 2020-07-14 DIAGNOSIS — E11.8 TYPE 2 DIABETES MELLITUS WITH COMPLICATION, WITH LONG-TERM CURRENT USE OF INSULIN (HCC): ICD-10-CM

## 2020-07-14 DIAGNOSIS — Z79.4 TYPE 2 DIABETES MELLITUS WITH COMPLICATION, WITH LONG-TERM CURRENT USE OF INSULIN (HCC): ICD-10-CM

## 2020-07-14 LAB
HBA1C MFR BLD: 6.5 % (ref 0–5.6)
INT CON NEG: ABNORMAL
INT CON POS: ABNORMAL

## 2020-07-14 PROCEDURE — 83036 HEMOGLOBIN GLYCOSYLATED A1C: CPT | Performed by: FAMILY MEDICINE

## 2020-07-14 PROCEDURE — 99214 OFFICE O/P EST MOD 30 MIN: CPT | Performed by: FAMILY MEDICINE

## 2020-07-14 ASSESSMENT — FIBROSIS 4 INDEX: FIB4 SCORE: 0.81

## 2020-07-14 NOTE — PROGRESS NOTES
Subjective:     CC: DM2    HPI:   Beni presents today with     DM2:  Patient here for DM2 management.  This is a chronic stable issue.  Patient who is status post transmetatarsal amputation of the foot on glimepiride (could not tolerate metformin), Trulicity, and Lantus 10 units nightly.  Is currently on a statin and ACE.  He is going to follow-up with podiatry.  He is up-to-date on his eye exams.  His hemoglobin A1c is 6.5 and his blood sugar remained stable at home per fingersticks.    Past Medical History:   Diagnosis Date   • Diabetes (HCC)    • Hypertension        Social History     Tobacco Use   • Smoking status: Never Smoker   • Smokeless tobacco: Never Used   Substance Use Topics   • Alcohol use: No   • Drug use: No       Current Outpatient Medications Ordered in Epic   Medication Sig Dispense Refill   • glimepiride (AMARYL) 4 MG Tab TAKE 1 TABLET BY MOUTH EVERY DAY 30 Tab 3   • insulin glargine (LANTUS) 100 UNIT/ML Solution USE AS DIRECTED TO INJECT 10-15 UNITS ONCE A DAY     • cholestyramine (QUESTRAN) 4 GM/DOSE powder MIX 1 SCOOP IN APPLE SAUCE OR JUICE TWICE DAILY.     • lisinopril (PRINIVIL) 10 MG Tab Take 1 Tab by mouth every day. 90 Tab 3   • aspirin 81 MG EC tablet Take 1 Tab by mouth every day. 90 Tab 3   • atorvastatin (LIPITOR) 80 MG tablet Take 1 Tab by mouth every bedtime. 90 Tab 3   • carvedilol (COREG) 6.25 MG Tab Take 1 Tab by mouth 2 times a day, with meals. 180 Tab 3   • ticagrelor (BRILINTA) 90 MG Tab tablet Take 1 Tab by mouth 2 Times a Day. 180 Tab 3   • gabapentin (NEURONTIN) 100 MG Cap Take 1 Cap by mouth every bedtime. 90 Cap 3   • omeprazole (PRILOSEC) 20 MG delayed-release capsule Take 20 mg by mouth every day.       No current [facility]-ordered facility-administered medications on file.        Allergies:  Patient has no known allergies.    ROS:  Gen: no fevers/chill, no changes in weight  Eyes: no changes in vision  ENT: no sore throat, no hearing loss, no bloody nose  Pulm: no sob, no  cough  CV: no chest pain, no palpitations  GI: no nausea/vomiting, no diarrhea  : no dysuria  MSk: no myalgias  Skin: no rash  Neuro: no headaches, no numbness/tingling  Heme/Lymph: no easy bruising      Objective:       Exam:  /88 (BP Location: Left arm, Patient Position: Sitting)   Pulse 78   Temp 36.6 °C (97.9 °F) (Temporal)   Ht 1.829 m (6')   Wt 101.6 kg (224 lb)   SpO2 98%   BMI 30.38 kg/m²  Body mass index is 30.38 kg/m².    Gen: Alert and oriented, No apparent distress.  Neck: Neck is supple without lymphadenopathy.  Lungs: Normal effort, CTA bilaterally, no wheezes, rhonchi, or rales  CV: Regular rate and rhythm. No murmurs, rubs, or gallops.               Ext: No clubbing, cyanosis, edema.  Diabetic foot exam: No lesions or calluses noted. 2+ pedal pulses. Sensation intact with 10 out of 10 on monofilament test.    Assessment & Plan:     49 y.o. male with the following -     1. Type 2 diabetes mellitus with complication, with long-term current use of insulin (HCC)  Patient has very good management of his diabetes at this point.  He understands some of his secondary conditions from diabetes and now is more in control of his blood sugars.  Continue annual's and monitoring.  - POCT Hemoglobin A1C  - Diabetic Monofilament Lower Extremity Exam  - HEMOGLOBIN A1C; Future  - CBC WITH DIFFERENTIAL; Future  - Comp Metabolic Panel; Future  - Lipid Profile; Future  - TSH WITH REFLEX TO FT4; Future  - MICROALBUMIN CREAT RATIO URINE; Future        Please note that this dictation was created using voice recognition software. I have made every reasonable attempt to correct obvious errors, but I expect that there are errors of grammar and possibly content that I did not discover before finalizing the note.

## 2020-08-03 RX ORDER — GLIMEPIRIDE 4 MG/1
TABLET ORAL
Qty: 30 TAB | Refills: 3 | Status: SHIPPED | OUTPATIENT
Start: 2020-08-03 | End: 2020-12-02

## 2020-08-03 NOTE — TELEPHONE ENCOUNTER
Received request via: Pharmacy    Was the patient seen in the last year in this department? Yes  7/14/20  Does the patient have an active prescription (recently filled or refills available) for medication(s) requested? No

## 2020-10-28 NOTE — TELEPHONE ENCOUNTER
From: Beni Moore  To: Office of Marita Overton M.D.  Sent: 10/28/2020 3:58 PM PDT  Subject: Medication Renewal Request    Refills have been requested for the following medications:   Other - Trulicity    Preferred pharmacy: [id number] - [place], [place] - [address]  Delivery method: Pickup

## 2020-10-29 RX ORDER — DULAGLUTIDE 1.5 MG/.5ML
0.5 INJECTION, SOLUTION SUBCUTANEOUS
Qty: 30 EACH | Refills: 0 | Status: SHIPPED | OUTPATIENT
Start: 2020-10-29 | End: 2021-01-27

## 2020-11-02 DIAGNOSIS — E11.8 TYPE 2 DIABETES MELLITUS WITH COMPLICATION, WITH LONG-TERM CURRENT USE OF INSULIN (HCC): ICD-10-CM

## 2020-11-02 DIAGNOSIS — Z79.4 TYPE 2 DIABETES MELLITUS WITH COMPLICATION, WITH LONG-TERM CURRENT USE OF INSULIN (HCC): ICD-10-CM

## 2020-11-02 RX ORDER — INSULIN GLARGINE 100 [IU]/ML
INJECTION, SOLUTION SUBCUTANEOUS
Qty: 10 ML | Refills: 2 | Status: SHIPPED | OUTPATIENT
Start: 2020-11-02 | End: 2020-11-06 | Stop reason: SDUPTHER

## 2020-11-02 NOTE — TELEPHONE ENCOUNTER
Please advise on 90 day supply.    Received request via: Patient    Was the patient seen in the last year in this department? Yes LOv 7/14/2020    Does the patient have an active prescription (recently filled or refills available) for medication(s) requested? No

## 2020-11-02 NOTE — TELEPHONE ENCOUNTER
Prescription Question    Beni Moore Hina M, M.D. 2 days ago        I need to refill my lantus prescription 90 day supply.  100ml vial for needle injection not the pens

## 2020-11-06 DIAGNOSIS — Z79.4 TYPE 2 DIABETES MELLITUS WITH COMPLICATION, WITH LONG-TERM CURRENT USE OF INSULIN (HCC): ICD-10-CM

## 2020-11-06 DIAGNOSIS — E11.8 TYPE 2 DIABETES MELLITUS WITH COMPLICATION, WITH LONG-TERM CURRENT USE OF INSULIN (HCC): ICD-10-CM

## 2020-11-06 NOTE — TELEPHONE ENCOUNTER
Received request via: Patient    Was the patient seen in the last year in this department? Yes LOV 7/14/2020    Does the patient have an active prescription (recently filled or refills available) for medication(s) requested? No

## 2020-11-09 RX ORDER — INSULIN GLARGINE 100 [IU]/ML
INJECTION, SOLUTION SUBCUTANEOUS
Qty: 10 ML | Refills: 2 | Status: SHIPPED | OUTPATIENT
Start: 2020-11-09 | End: 2021-02-24 | Stop reason: SDUPTHER

## 2020-11-28 DIAGNOSIS — I10 ESSENTIAL HYPERTENSION: ICD-10-CM

## 2020-11-28 DIAGNOSIS — E78.5 HYPERLIPIDEMIA, UNSPECIFIED HYPERLIPIDEMIA TYPE: ICD-10-CM

## 2020-12-02 RX ORDER — GLIMEPIRIDE 4 MG/1
TABLET ORAL
Qty: 90 TAB | Refills: 0 | Status: SHIPPED | OUTPATIENT
Start: 2020-12-02 | End: 2021-03-22

## 2020-12-02 NOTE — TELEPHONE ENCOUNTER
Received request via: Pharmacy    Was the patient seen in the last year in this department? Yes  7/14/2020  Does the patient have an active prescription (recently filled or refills available) for medication(s) requested? No

## 2020-12-03 RX ORDER — CARVEDILOL 6.25 MG/1
TABLET ORAL
Qty: 180 TAB | Refills: 1 | Status: SHIPPED | OUTPATIENT
Start: 2020-12-03 | End: 2021-06-03

## 2020-12-03 RX ORDER — ATORVASTATIN CALCIUM 80 MG/1
TABLET, FILM COATED ORAL
Qty: 90 TAB | Refills: 1 | Status: SHIPPED | OUTPATIENT
Start: 2020-12-03 | End: 2021-06-01

## 2020-12-08 ENCOUNTER — TELEPHONE (OUTPATIENT)
Dept: MEDICAL GROUP | Facility: LAB | Age: 50
End: 2020-12-08

## 2020-12-08 DIAGNOSIS — Z89.411 STATUS POST AMPUTATION OF GREAT TOE, RIGHT (HCC): ICD-10-CM

## 2020-12-08 NOTE — TELEPHONE ENCOUNTER
----- Message from Beni Moore sent at 12/8/2020  1:42 PM PST -----  Regarding: RE:message  Contact: [phone]  Rosa M my phone is [phone]

## 2020-12-08 NOTE — TELEPHONE ENCOUNTER
Pt is requesting a brace that fits in his boot for ankle support. Pt reports he has an appt with [facility] on 12/14.

## 2020-12-16 DIAGNOSIS — I21.4 NSTEMI (NON-ST ELEVATED MYOCARDIAL INFARCTION) (HCC): ICD-10-CM

## 2020-12-21 RX ORDER — ASPIRIN 81 MG/1
TABLET ORAL
Qty: 90 TAB | Refills: 3 | Status: SHIPPED | OUTPATIENT
Start: 2020-12-21 | End: 2021-06-07 | Stop reason: SDUPTHER

## 2020-12-30 RX ORDER — GABAPENTIN 100 MG/1
CAPSULE ORAL
Qty: 90 CAP | Refills: 3 | Status: SHIPPED | OUTPATIENT
Start: 2020-12-30 | End: 2021-06-03 | Stop reason: SDUPTHER

## 2020-12-30 NOTE — TELEPHONE ENCOUNTER
Received request via: Pharmacy    Was the patient seen in the last year in this department? Yes  7/17/20  Does the patient have an active prescription (recently filled or refills available) for medication(s) requested? No

## 2021-01-04 ENCOUNTER — PATIENT MESSAGE (OUTPATIENT)
Dept: MEDICAL GROUP | Facility: LAB | Age: 51
End: 2021-01-04

## 2021-01-04 RX ORDER — LISINOPRIL 10 MG/1
10 TABLET ORAL DAILY
Qty: 90 TAB | Refills: 3 | Status: SHIPPED | OUTPATIENT
Start: 2021-01-04 | End: 2021-06-17 | Stop reason: SDUPTHER
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
## 2021-02-11 ENCOUNTER — TELEMEDICINE (OUTPATIENT)
Dept: MEDICAL GROUP | Facility: LAB | Age: 51
End: 2021-02-11
Payer: MEDICARE

## 2021-02-11 VITALS — HEIGHT: 72 IN | WEIGHT: 220 LBS | BODY MASS INDEX: 29.8 KG/M2

## 2021-02-11 DIAGNOSIS — Z00.00 WELL ADULT EXAM: ICD-10-CM

## 2021-02-11 RX ORDER — DULAGLUTIDE 1.5 MG/.5ML
1 INJECTION, SOLUTION SUBCUTANEOUS
Qty: 2.24 ML | Refills: 3 | Status: SHIPPED | OUTPATIENT
Start: 2021-02-11 | End: 2021-02-11 | Stop reason: SDUPTHER

## 2021-02-11 RX ORDER — DULAGLUTIDE 1.5 MG/.5ML
1 INJECTION, SOLUTION SUBCUTANEOUS
Qty: 12 EACH | Refills: 3 | Status: SHIPPED | OUTPATIENT
Start: 2021-02-11 | End: 2021-05-12

## 2021-02-11 ASSESSMENT — FIBROSIS 4 INDEX: FIB4 SCORE: 0.83

## 2021-02-11 ASSESSMENT — PATIENT HEALTH QUESTIONNAIRE - PHQ9: CLINICAL INTERPRETATION OF PHQ2 SCORE: 0

## 2021-02-11 NOTE — PROGRESS NOTES
Virtual Visit: Established Patient   This visit was conducted via Zoom using secure and encrypted videoconferencing technology. The patient was in a private location in the state of [place].    The patient's identity was confirmed and verbal consent was obtained for this virtual visit.    Subjective:   CC:   Chief Complaint   Patient presents with   • Annual Exam   • Medication Refill       Beni Moore is a 50 y.o. male presenting for evaluation and management of:    Last colonoscopy: 2019, every ten  Last Tdap: Done  Hx STDs: No  Recent STD test: No  Qualify for abdominal us screen: Age 63  Qualify for hep c screen: Yes, tattoos at home, declined screening  Regular exercise: Yes   Diet: Balanced diet        ROS   Denies any recent fevers or chills. No nausea or vomiting. No chest pains or shortness of breath.     No Known Allergies    Current medicines (including changes today)  Current Outpatient Medications   Medication Sig Dispense Refill   • Dulaglutide (TRULICITY SC) Inject  under the skin.     • lisinopril (PRINIVIL) 10 MG Tab Take 1 Tab by mouth every day. 90 Tab 3   • gabapentin (NEURONTIN) 100 MG Cap TAKE 1 CAPSULE BY MOUTH AT BEDTIME 90 Cap 3   • aspirin 81 MG EC tablet TAKE 1 TABLET BY MOUTH EVERY DAY 90 Tab 3   • carvedilol (COREG) 6.25 MG Tab TAKE 1 TABLET BY MOUTH TWICE DAILY WITH MEALS 180 Tab 1   • atorvastatin (LIPITOR) 80 MG tablet TAKE 1 TABLET BY MOUTH EVERY NIGHT AT BEDTIME 90 Tab 1   • glimepiride (AMARYL) 4 MG Tab TAKE 1 TABLET BY MOUTH EVERY DAY 90 Tab 0   • insulin glargine (LANTUS) 100 UNIT/ML Solution USE AS DIRECTED TO INJECT 10-15 UNITS ONCE A DAY 10 mL 2   • cholestyramine (QUESTRAN) 4 GM/DOSE powder MIX 1 SCOOP IN APPLE SAUCE OR JUICE TWICE DAILY.     • omeprazole (PRILOSEC) 20 MG delayed-release capsule Take 20 mg by mouth every day.     • ticagrelor (BRILINTA) 90 MG Tab tablet Take 1 Tab by mouth 2 Times a Day. (Patient not taking: Reported on 2/11/2021) 180 Tab 3     No current  facility-administered medications for this visit.       Patient Active Problem List    Diagnosis Date Noted   • Status post amputation of great toe, right ([facility]) 06/30/2018     Priority: High   • Retinopathy 01/14/2020   • Conductive hearing loss, bilateral 01/14/2020   • Gastroesophageal reflux disease without esophagitis 11/20/2019   • Coronary artery disease involving native coronary artery of native heart without angina pectoris 10/02/2019   • Functional diarrhea 09/27/2019   • Type 2 diabetes mellitus with complication, with long-term current use of insulin ([facility]) 09/21/2019   • Essential hypertension 09/21/2019   • NSTEMI (non-ST elevated myocardial infarction) ([facility]) 09/21/2019   • AXEL (acute kidney injury) ([facility]) 09/21/2019   • Chronic fatigue 09/21/2019   • S/P transmetatarsal amputation of foot, right ([facility]) 07/05/2018   • Normocytic anemia 06/30/2018   • Type 2 diabetes mellitus with hyperglycemia, without long-term current use of insulin ([facility]) 06/30/2018   • Overweight (BMI 25.0-29.9) 06/30/2018       Family History   Problem Relation Age of Onset   • No Known Problems Mother    • Diabetes Father    • Heart Disease Father        He  has a past medical history of Diabetes ([facility]) and Hypertension.  He  has a past surgical history that includes other abdominal surgery; other orthopedic surgery; other; toe amputation (Right, 7/2/2018); achilles tendon repair (Right, 7/2/2018); and irrigation & debridement ortho (Right, 7/2/2018).       Objective:   Ht 1.829 m (6')   Wt 99.8 kg (220 lb)   BMI 29.84 kg/m²     Physical Exam:  Constitutional: Alert, no distress, well-groomed.  Skin: No rashes in visible areas.  Eye: Round. Conjunctiva clear, lids normal. No icterus.   ENMT: Lips pink without lesions, good dentition, moist mucous membranes. Phonation normal.  Neck: No masses, no thyromegaly. Moves freely without pain.  Respiratory: Unlabored respiratory effort, no cough or audible wheeze  Psych: Alert and oriented  x3, normal affect and mood.       Assessment and Plan:   The following treatment plan was discussed:     1. Well adult exam  Due for labs, Meds refilled, Due for vaccines, Fu further management.   - HEMOGLOBIN A1C; Future  - CBC WITH DIFFERENTIAL; Future  - Comp Metabolic Panel; Future  - Lipid Profile; Future  - TSH WITH REFLEX TO FT4; Future  - MICROALBUMIN CREAT RATIO URINE; Future    Follow-up: No follow-ups on file.

## 2021-02-24 DIAGNOSIS — Z79.4 TYPE 2 DIABETES MELLITUS WITH COMPLICATION, WITH LONG-TERM CURRENT USE OF INSULIN (HCC): ICD-10-CM

## 2021-02-24 DIAGNOSIS — E11.8 TYPE 2 DIABETES MELLITUS WITH COMPLICATION, WITH LONG-TERM CURRENT USE OF INSULIN (HCC): ICD-10-CM

## 2021-02-24 RX ORDER — INSULIN GLARGINE 100 [IU]/ML
INJECTION, SOLUTION SUBCUTANEOUS
Qty: 10 ML | Refills: 2 | Status: SHIPPED | OUTPATIENT
Start: 2021-02-24 | End: 2021-06-14 | Stop reason: SDUPTHER

## 2021-02-24 NOTE — TELEPHONE ENCOUNTER
Received request via: Pharmacy    Was the patient seen in the last year in this department? Yes  2/11/21  Does the patient have an active prescription (recently filled or refills available) for medication(s) requested? No

## 2021-03-22 RX ORDER — GLIMEPIRIDE 4 MG/1
TABLET ORAL
Qty: 30 TABLET | Refills: 5 | Status: SHIPPED | OUTPATIENT
Start: 2021-03-22 | End: 2021-06-07 | Stop reason: SDUPTHER

## 2021-05-29 DIAGNOSIS — E78.5 HYPERLIPIDEMIA, UNSPECIFIED HYPERLIPIDEMIA TYPE: ICD-10-CM

## 2021-06-01 RX ORDER — GABAPENTIN 100 MG/1
CAPSULE ORAL
Qty: 90 CAPSULE | Refills: 3 | OUTPATIENT
Start: 2021-06-01

## 2021-06-01 RX ORDER — ATORVASTATIN CALCIUM 80 MG/1
80 TABLET, FILM COATED ORAL EVERY EVENING
Qty: 90 TABLET | Refills: 0 | Status: SHIPPED | OUTPATIENT
Start: 2021-06-01 | End: 2021-07-01 | Stop reason: SDUPTHER

## 2021-06-01 RX ORDER — GLIMEPIRIDE 4 MG/1
4 TABLET ORAL
Qty: 90 TABLET | Refills: 3 | OUTPATIENT
Start: 2021-06-01

## 2021-06-02 ENCOUNTER — OFFICE VISIT (OUTPATIENT)
Dept: MEDICAL GROUP | Facility: PHYSICIAN GROUP | Age: 51
End: 2021-06-02
Payer: MEDICARE

## 2021-06-02 ENCOUNTER — HOSPITAL ENCOUNTER (OUTPATIENT)
Dept: LAB | Facility: MEDICAL CENTER | Age: 51
End: 2021-06-02
Attending: INTERNAL MEDICINE
Payer: MEDICARE

## 2021-06-02 VITALS
TEMPERATURE: 99.6 F | BODY MASS INDEX: 31.45 KG/M2 | DIASTOLIC BLOOD PRESSURE: 84 MMHG | SYSTOLIC BLOOD PRESSURE: 130 MMHG | RESPIRATION RATE: 18 BRPM | OXYGEN SATURATION: 98 % | WEIGHT: 232.2 LBS | HEART RATE: 83 BPM | HEIGHT: 72 IN

## 2021-06-02 DIAGNOSIS — Z89.431 S/P TRANSMETATARSAL AMPUTATION OF FOOT, RIGHT (HCC): ICD-10-CM

## 2021-06-02 DIAGNOSIS — K59.1 FUNCTIONAL DIARRHEA: ICD-10-CM

## 2021-06-02 DIAGNOSIS — I10 ESSENTIAL HYPERTENSION: ICD-10-CM

## 2021-06-02 DIAGNOSIS — H90.0 CONDUCTIVE HEARING LOSS, BILATERAL: ICD-10-CM

## 2021-06-02 DIAGNOSIS — I21.4 NSTEMI (NON-ST ELEVATED MYOCARDIAL INFARCTION) (HCC): ICD-10-CM

## 2021-06-02 DIAGNOSIS — D64.9 NORMOCYTIC ANEMIA: ICD-10-CM

## 2021-06-02 DIAGNOSIS — I25.10 CORONARY ARTERY DISEASE INVOLVING NATIVE CORONARY ARTERY OF NATIVE HEART WITHOUT ANGINA PECTORIS: ICD-10-CM

## 2021-06-02 DIAGNOSIS — H35.00 RETINOPATHY: ICD-10-CM

## 2021-06-02 DIAGNOSIS — E11.65 TYPE 2 DIABETES MELLITUS WITH HYPERGLYCEMIA, WITHOUT LONG-TERM CURRENT USE OF INSULIN (HCC): ICD-10-CM

## 2021-06-02 DIAGNOSIS — E78.5 HYPERLIPIDEMIA, UNSPECIFIED HYPERLIPIDEMIA TYPE: ICD-10-CM

## 2021-06-02 PROBLEM — Z79.4 TYPE 2 DIABETES MELLITUS WITH COMPLICATION, WITH LONG-TERM CURRENT USE OF INSULIN (HCC): Status: RESOLVED | Noted: 2019-09-21 | Resolved: 2021-06-02

## 2021-06-02 PROBLEM — K21.9 GASTROESOPHAGEAL REFLUX DISEASE WITHOUT ESOPHAGITIS: Status: RESOLVED | Noted: 2019-11-20 | Resolved: 2021-06-02

## 2021-06-02 PROBLEM — R53.82 CHRONIC FATIGUE: Status: RESOLVED | Noted: 2019-09-21 | Resolved: 2021-06-02

## 2021-06-02 PROBLEM — Z89.411 STATUS POST AMPUTATION OF GREAT TOE, RIGHT (HCC): Status: RESOLVED | Noted: 2018-06-30 | Resolved: 2021-06-02

## 2021-06-02 PROBLEM — E11.8 TYPE 2 DIABETES MELLITUS WITH COMPLICATION, WITH LONG-TERM CURRENT USE OF INSULIN (HCC): Status: RESOLVED | Noted: 2019-09-21 | Resolved: 2021-06-02

## 2021-06-02 LAB
ALBUMIN SERPL BCP-MCNC: 3.4 G/DL (ref 3.2–4.9)
ALBUMIN/GLOB SERPL: 1.2 G/DL
ALP SERPL-CCNC: 63 U/L (ref 30–99)
ALT SERPL-CCNC: 29 U/L (ref 2–50)
ANION GAP SERPL CALC-SCNC: 7 MMOL/L (ref 7–16)
AST SERPL-CCNC: 20 U/L (ref 12–45)
BILIRUB SERPL-MCNC: 1.3 MG/DL (ref 0.1–1.5)
BUN SERPL-MCNC: 19 MG/DL (ref 8–22)
CALCIUM SERPL-MCNC: 8.2 MG/DL (ref 8.5–10.5)
CHLORIDE SERPL-SCNC: 109 MMOL/L (ref 96–112)
CHOLEST SERPL-MCNC: 79 MG/DL (ref 100–199)
CO2 SERPL-SCNC: 22 MMOL/L (ref 20–33)
CREAT SERPL-MCNC: 1.44 MG/DL (ref 0.5–1.4)
FASTING STATUS PATIENT QL REPORTED: NORMAL
GLOBULIN SER CALC-MCNC: 2.8 G/DL (ref 1.9–3.5)
GLUCOSE SERPL-MCNC: 272 MG/DL (ref 65–99)
HDLC SERPL-MCNC: 35 MG/DL
LDLC SERPL CALC-MCNC: 26 MG/DL
POTASSIUM SERPL-SCNC: 4.6 MMOL/L (ref 3.6–5.5)
PROT SERPL-MCNC: 6.2 G/DL (ref 6–8.2)
SODIUM SERPL-SCNC: 138 MMOL/L (ref 135–145)
TRIGL SERPL-MCNC: 88 MG/DL (ref 0–149)

## 2021-06-02 PROCEDURE — 80053 COMPREHEN METABOLIC PANEL: CPT

## 2021-06-02 PROCEDURE — 36415 COLL VENOUS BLD VENIPUNCTURE: CPT

## 2021-06-02 PROCEDURE — 99214 OFFICE O/P EST MOD 30 MIN: CPT | Performed by: FAMILY MEDICINE

## 2021-06-02 PROCEDURE — 80061 LIPID PANEL: CPT

## 2021-06-02 RX ORDER — DULAGLUTIDE 1.5 MG/.5ML
INJECTION, SOLUTION SUBCUTANEOUS
COMMUNITY
End: 2021-06-18 | Stop reason: SDUPTHER

## 2021-06-02 SDOH — ECONOMIC STABILITY: TRANSPORTATION INSECURITY
IN THE PAST 12 MONTHS, HAS LACK OF TRANSPORTATION KEPT YOU FROM MEETINGS, WORK, OR FROM GETTING THINGS NEEDED FOR DAILY LIVING?: NO

## 2021-06-02 SDOH — ECONOMIC STABILITY: INCOME INSECURITY: HOW HARD IS IT FOR YOU TO PAY FOR THE VERY BASICS LIKE FOOD, HOUSING, MEDICAL CARE, AND HEATING?: NOT HARD AT ALL

## 2021-06-02 SDOH — ECONOMIC STABILITY: HOUSING INSECURITY
IN THE LAST 12 MONTHS, WAS THERE A TIME WHEN YOU DID NOT HAVE A STEADY PLACE TO SLEEP OR SLEPT IN A SHELTER (INCLUDING NOW)?: NO

## 2021-06-02 SDOH — ECONOMIC STABILITY: INCOME INSECURITY: IN THE LAST 12 MONTHS, WAS THERE A TIME WHEN YOU WERE NOT ABLE TO PAY THE MORTGAGE OR RENT ON TIME?: NO

## 2021-06-02 SDOH — HEALTH STABILITY: PHYSICAL HEALTH: ON AVERAGE, HOW MANY MINUTES DO YOU ENGAGE IN EXERCISE AT THIS LEVEL?: 30 MIN

## 2021-06-02 SDOH — HEALTH STABILITY: PHYSICAL HEALTH: ON AVERAGE, HOW MANY DAYS PER WEEK DO YOU ENGAGE IN MODERATE TO STRENUOUS EXERCISE (LIKE A BRISK WALK)?: 3 DAYS

## 2021-06-02 SDOH — ECONOMIC STABILITY: TRANSPORTATION INSECURITY
IN THE PAST 12 MONTHS, HAS THE LACK OF TRANSPORTATION KEPT YOU FROM MEDICAL APPOINTMENTS OR FROM GETTING MEDICATIONS?: NO

## 2021-06-02 SDOH — ECONOMIC STABILITY: FOOD INSECURITY: WITHIN THE PAST 12 MONTHS, THE FOOD YOU BOUGHT JUST DIDN'T LAST AND YOU DIDN'T HAVE MONEY TO GET MORE.: NEVER TRUE

## 2021-06-02 SDOH — ECONOMIC STABILITY: FOOD INSECURITY: WITHIN THE PAST 12 MONTHS, YOU WORRIED THAT YOUR FOOD WOULD RUN OUT BEFORE YOU GOT MONEY TO BUY MORE.: NEVER TRUE

## 2021-06-02 SDOH — ECONOMIC STABILITY: HOUSING INSECURITY: IN THE LAST 12 MONTHS, HOW MANY PLACES HAVE YOU LIVED?: 2

## 2021-06-02 SDOH — HEALTH STABILITY: MENTAL HEALTH
STRESS IS WHEN SOMEONE FEELS TENSE, NERVOUS, ANXIOUS, OR CAN'T SLEEP AT NIGHT BECAUSE THEIR MIND IS TROUBLED. HOW STRESSED ARE YOU?: NOT AT ALL

## 2021-06-02 SDOH — ECONOMIC STABILITY: TRANSPORTATION INSECURITY
IN THE PAST 12 MONTHS, HAS LACK OF RELIABLE TRANSPORTATION KEPT YOU FROM MEDICAL APPOINTMENTS, MEETINGS, WORK OR FROM GETTING THINGS NEEDED FOR DAILY LIVING?: NO

## 2021-06-02 ASSESSMENT — SOCIAL DETERMINANTS OF HEALTH (SDOH)
HOW OFTEN DO YOU ATTEND CHURCH OR RELIGIOUS SERVICES?: NEVER
IN A TYPICAL WEEK, HOW MANY TIMES DO YOU TALK ON THE PHONE WITH FAMILY, FRIENDS, OR NEIGHBORS?: MORE THAN THREE TIMES A WEEK
HOW OFTEN DO YOU ATTEND CHURCH OR RELIGIOUS SERVICES?: NEVER
IN A TYPICAL WEEK, HOW MANY TIMES DO YOU TALK ON THE PHONE WITH FAMILY, FRIENDS, OR NEIGHBORS?: MORE THAN THREE TIMES A WEEK
WITHIN THE PAST 12 MONTHS, YOU WORRIED THAT YOUR FOOD WOULD RUN OUT BEFORE YOU GOT THE MONEY TO BUY MORE: NEVER TRUE
HOW OFTEN DO YOU GET TOGETHER WITH FRIENDS OR RELATIVES?: MORE THAN THREE TIMES A WEEK
DO YOU BELONG TO ANY CLUBS OR ORGANIZATIONS SUCH AS CHURCH GROUPS UNIONS, FRATERNAL OR ATHLETIC GROUPS, OR SCHOOL GROUPS?: NO
HOW OFTEN DO YOU HAVE SIX OR MORE DRINKS ON ONE OCCASION: NEVER
HOW OFTEN DO YOU GET TOGETHER WITH FRIENDS OR RELATIVES?: MORE THAN THREE TIMES A WEEK
HOW OFTEN DO YOU ATTENT MEETINGS OF THE CLUB OR ORGANIZATION YOU BELONG TO?: NEVER
HOW OFTEN DO YOU HAVE A DRINK CONTAINING ALCOHOL: NEVER
HOW HARD IS IT FOR YOU TO PAY FOR THE VERY BASICS LIKE FOOD, HOUSING, MEDICAL CARE, AND HEATING?: NOT HARD AT ALL
DO YOU BELONG TO ANY CLUBS OR ORGANIZATIONS SUCH AS CHURCH GROUPS UNIONS, FRATERNAL OR ATHLETIC GROUPS, OR SCHOOL GROUPS?: NO
HOW OFTEN DO YOU ATTENT MEETINGS OF THE CLUB OR ORGANIZATION YOU BELONG TO?: NEVER

## 2021-06-02 ASSESSMENT — LIFESTYLE VARIABLES
HOW OFTEN DO YOU HAVE A DRINK CONTAINING ALCOHOL: NEVER
HOW OFTEN DO YOU HAVE SIX OR MORE DRINKS ON ONE OCCASION: NEVER

## 2021-06-02 ASSESSMENT — FIBROSIS 4 INDEX: FIB4 SCORE: 0.83

## 2021-06-02 NOTE — PROGRESS NOTES
Subjective:     CC:   Chief Complaint   Patient presents with   • Establish Care       HPI:   Beni presents today to establish care.  Patient is hard of hearing but due to the fact he does wear a mask he has not been using his hearing aids but since things are opening up he is planning on using.  Patient does have severe diabetic disease that resulted in amputation to his foot also he has diabetic retinopathy he sees an eye provider every 6 weeks for.  Patient also has heart disease and he sees cardiology for this his next follow-up will be in July.  Patient is having issues due to back he has diabetes with chronic diarrhea he does see GI for this next appointment should be in July.  Patient feels that his diabetes for the last 3 to 4 years has been under good control with his hemoglobin A1c has been in the sixes.  I did have to remove  my mask in order for patient to understand what I was saying due to his hearing loss    Past Medical History:   Diagnosis Date   • Diabetes (HCC)    • Hypertension        Social History     Tobacco Use   • Smoking status: Never Smoker   • Smokeless tobacco: Never Used   Vaping Use   • Vaping Use: Never used   Substance Use Topics   • Alcohol use: No   • Drug use: No       Current Outpatient Medications Ordered in Epic   Medication Sig Dispense Refill   • Dulaglutide (TRULICITY) 1.5 MG/0.5ML Solution Pen-injector Inject  under the skin.     • Ranibizumab (LUCENTIS IZ) by Intravitreal route.     • atorvastatin (LIPITOR) 80 MG tablet Take 1 tablet by mouth every evening. Please call [phone] to schedule a follow up visit thank you 90 tablet 0   • glimepiride (AMARYL) 4 MG Tab TAKE 1 TABLET BY MOUTH EVERY DAY 30 tablet 5   • insulin glargine (LANTUS) 100 UNIT/ML Solution USE AS DIRECTED TO INJECT 10-15 UNITS ONCE A DAY 10 mL 2   • lisinopril (PRINIVIL) 10 MG Tab Take 1 Tab by mouth every day. 90 Tab 3   • gabapentin (NEURONTIN) 100 MG Cap TAKE 1 CAPSULE BY MOUTH AT BEDTIME 90 Cap 3    • aspirin 81 MG EC tablet TAKE 1 TABLET BY MOUTH EVERY DAY 90 Tab 3   • carvedilol (COREG) 6.25 MG Tab TAKE 1 TABLET BY MOUTH TWICE DAILY WITH MEALS 180 Tab 1   • cholestyramine (QUESTRAN) 4 GM/DOSE powder MIX 1 SCOOP IN APPLE SAUCE OR JUICE TWICE DAILY.     • omeprazole (PRILOSEC) 20 MG delayed-release capsule Take 20 mg by mouth every day.       No current [facility]-ordered facility-administered medications on file.       Allergies:  Patient has no known allergies.    Health Maintenance: Completed    ROS:  Gen: no fevers/chills  Eyes: no changes in vision  ENT: no sore throat, no hearing loss, no bloody nose  Pulm: no sob, no cough, patient denies any snoring or stop breathing when sleeping at night  CV: no chest pain, no palpitations  GI: no nausea/vomiting, patient denies any black or bloody stools  : no dysuria  MSk: no myalgias  Skin: no rash  Neuro: no headaches, no numbness/tingling  Heme/Lymph: no easy bruising    Objective:     Exam:  /84   Pulse 83   Temp 37.6 °C (99.6 °F)   Resp 18   Ht 1.829 m (6')   Wt 105 kg (232 lb 3.2 oz)   SpO2 98%   BMI 31.49 kg/m²  Body mass index is 31.49 kg/m².    Gen: Alert and oriented, No apparent distress.  Skin: Warm and dry.  No obvious lesions.  Eyes: Sclera wnl Pupils normal in size  Lungs: Normal effort, CTA bilaterally, no wheezes, rhonchi, or rales  CV: Regular rate and rhythm. No murmurs, rubs, or gallops.  ABD: Soft non-tender no organomegaly  Musculoskeletal: Normal gait. No extremity cyanosis, clubbing, or edema.  Neuro: Oriented to person, place and time  Psych: Mood is wnl       Labs: Patient did see cardiologist to recommend ordering a, metabolic and lipid I will also add hemoglobin A1c microalbumin also a CBC.    Assessment & Plan:     50 y.o. male with the following -     1. Normocytic anemia  We will order a CBC this is a chronic problem  2. Type 2 diabetes mellitus with hyperglycemia, without long-term current use of insulin (HCC)  We will see  what his hemoglobin A1c is this is a chronic problem    3. S/P transmetatarsal amputation of foot, right (HCC)  This is a chronic problem    4. Essential hypertension  Blood pressure looks under good control we will continue present meds this is a chronic problem    5. NSTEMI (non-ST elevated myocardial infarction) (HCC)  Patient is seeing cardiologist for this this is a chronic problem    6. Functional diarrhea  Is seeing gastroenterology for this this is a chronic problem    7. Coronary artery disease involving native coronary artery of native heart without angina pectoris    8. Retinopathy  Patient is seen eye provider for this this is a chronic problem    9. Conductive hearing loss, bilateral  Patient will be wearing his hearing aids more often since things are opening up this is a chronic problem         Return in about 4 weeks (around 6/30/2021).    Please note that this dictation was created using voice recognition software. I have made every reasonable attempt to correct obvious errors, but I expect that there are errors of grammar and possibly content that I did not discover before finalizing the note.

## 2021-06-03 RX ORDER — CARVEDILOL 6.25 MG/1
TABLET ORAL
Qty: 180 TABLET | Refills: 0 | Status: SHIPPED | OUTPATIENT
Start: 2021-06-03 | End: 2021-07-01

## 2021-06-03 RX ORDER — GABAPENTIN 100 MG/1
CAPSULE ORAL
Qty: 90 CAPSULE | Refills: 3 | Status: SHIPPED | OUTPATIENT
Start: 2021-06-03 | End: 2021-08-19

## 2021-06-03 NOTE — TELEPHONE ENCOUNTER
Received request via: Pharmacy    Was the patient seen in the last year in this department? Yes  LOV 6/2/21  Does the patient have an active prescription (recently filled or refills available) for medication(s) requested? No

## 2021-06-07 DIAGNOSIS — I21.4 NSTEMI (NON-ST ELEVATED MYOCARDIAL INFARCTION) (HCC): ICD-10-CM

## 2021-06-07 DIAGNOSIS — E78.5 HYPERLIPIDEMIA, UNSPECIFIED HYPERLIPIDEMIA TYPE: ICD-10-CM

## 2021-06-07 RX ORDER — GLIMEPIRIDE 4 MG/1
4 TABLET ORAL
Qty: 90 TABLET | Refills: 3 | Status: SHIPPED | OUTPATIENT
Start: 2021-06-07 | End: 2021-09-30

## 2021-06-07 RX ORDER — ATORVASTATIN CALCIUM 80 MG/1
80 TABLET, FILM COATED ORAL EVERY EVENING
Qty: 90 TABLET | Refills: 0 | OUTPATIENT
Start: 2021-06-07

## 2021-06-07 RX ORDER — ASPIRIN 81 MG/1
81 TABLET ORAL
Qty: 90 TABLET | Refills: 0 | Status: SHIPPED | OUTPATIENT
Start: 2021-06-07 | End: 2021-09-24 | Stop reason: SDUPTHER

## 2021-06-07 NOTE — TELEPHONE ENCOUNTER
Received request via: Pharmacy  Mail order  Was the patient seen in the last year in this department? Yes  6/2/21  Does the patient have an active prescription (recently filled or refills available) for medication(s) requested? No

## 2021-06-09 ENCOUNTER — HOSPITAL ENCOUNTER (EMERGENCY)
Facility: MEDICAL CENTER | Age: 51
End: 2021-06-09
Attending: EMERGENCY MEDICINE
Payer: MEDICARE

## 2021-06-09 VITALS
DIASTOLIC BLOOD PRESSURE: 68 MMHG | OXYGEN SATURATION: 93 % | HEIGHT: 72 IN | RESPIRATION RATE: 16 BRPM | WEIGHT: 231.04 LBS | SYSTOLIC BLOOD PRESSURE: 117 MMHG | HEART RATE: 94 BPM | TEMPERATURE: 98.3 F | BODY MASS INDEX: 31.29 KG/M2

## 2021-06-09 DIAGNOSIS — T25.221A PARTIAL THICKNESS BURN OF RIGHT FOOT, INITIAL ENCOUNTER: ICD-10-CM

## 2021-06-09 DIAGNOSIS — T25.222A PARTIAL THICKNESS BURN OF LEFT FOOT, INITIAL ENCOUNTER: ICD-10-CM

## 2021-06-09 LAB
ANION GAP SERPL CALC-SCNC: 9 MMOL/L (ref 7–16)
BASOPHILS # BLD AUTO: 0.3 % (ref 0–1.8)
BASOPHILS # BLD: 0.05 K/UL (ref 0–0.12)
BUN SERPL-MCNC: 17 MG/DL (ref 8–22)
CALCIUM SERPL-MCNC: 9 MG/DL (ref 8.5–10.5)
CHLORIDE SERPL-SCNC: 101 MMOL/L (ref 96–112)
CO2 SERPL-SCNC: 26 MMOL/L (ref 20–33)
CREAT SERPL-MCNC: 1.32 MG/DL (ref 0.5–1.4)
EOSINOPHIL # BLD AUTO: 0.1 K/UL (ref 0–0.51)
EOSINOPHIL NFR BLD: 0.6 % (ref 0–6.9)
ERYTHROCYTE [DISTWIDTH] IN BLOOD BY AUTOMATED COUNT: 40.7 FL (ref 35.9–50)
GLUCOSE SERPL-MCNC: 147 MG/DL (ref 65–99)
HCT VFR BLD AUTO: 43.8 % (ref 42–52)
HGB BLD-MCNC: 14.7 G/DL (ref 14–18)
IMM GRANULOCYTES # BLD AUTO: 0.11 K/UL (ref 0–0.11)
IMM GRANULOCYTES NFR BLD AUTO: 0.6 % (ref 0–0.9)
LYMPHOCYTES # BLD AUTO: 2.19 K/UL (ref 1–4.8)
LYMPHOCYTES NFR BLD: 12.7 % (ref 22–41)
MCH RBC QN AUTO: 30.2 PG (ref 27–33)
MCHC RBC AUTO-ENTMCNC: 33.6 G/DL (ref 33.7–35.3)
MCV RBC AUTO: 90.1 FL (ref 81.4–97.8)
MONOCYTES # BLD AUTO: 2.01 K/UL (ref 0–0.85)
MONOCYTES NFR BLD AUTO: 11.7 % (ref 0–13.4)
NEUTROPHILS # BLD AUTO: 12.79 K/UL (ref 1.82–7.42)
NEUTROPHILS NFR BLD: 74.1 % (ref 44–72)
NRBC # BLD AUTO: 0 K/UL
NRBC BLD-RTO: 0 /100 WBC
PLATELET # BLD AUTO: 256 K/UL (ref 164–446)
PMV BLD AUTO: 11.1 FL (ref 9–12.9)
POTASSIUM SERPL-SCNC: 4.5 MMOL/L (ref 3.6–5.5)
RBC # BLD AUTO: 4.86 M/UL (ref 4.7–6.1)
SODIUM SERPL-SCNC: 136 MMOL/L (ref 135–145)
WBC # BLD AUTO: 17.3 K/UL (ref 4.8–10.8)

## 2021-06-09 PROCEDURE — 99283 EMERGENCY DEPT VISIT LOW MDM: CPT

## 2021-06-09 PROCEDURE — 85025 COMPLETE CBC W/AUTO DIFF WBC: CPT

## 2021-06-09 PROCEDURE — 700101 HCHG RX REV CODE 250: Performed by: EMERGENCY MEDICINE

## 2021-06-09 PROCEDURE — 80048 BASIC METABOLIC PNL TOTAL CA: CPT

## 2021-06-09 RX ORDER — BACITRACIN ZINC AND POLYMYXIN B SULFATE 500; 1000 [USP'U]/G; [USP'U]/G
OINTMENT TOPICAL ONCE
Status: COMPLETED | OUTPATIENT
Start: 2021-06-09 | End: 2021-06-09

## 2021-06-09 RX ADMIN — Medication: at 20:45

## 2021-06-09 ASSESSMENT — FIBROSIS 4 INDEX: FIB4 SCORE: 0.75

## 2021-06-09 ASSESSMENT — LIFESTYLE VARIABLES: DO YOU DRINK ALCOHOL: NO
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
## 2021-06-10 NOTE — ED NOTES
Pt provided with discharge instructions. Pt had no further questions. Pt ambulated to [facility]

## 2021-06-14 DIAGNOSIS — Z79.4 TYPE 2 DIABETES MELLITUS WITH COMPLICATION, WITH LONG-TERM CURRENT USE OF INSULIN (HCC): ICD-10-CM

## 2021-06-14 DIAGNOSIS — E11.8 TYPE 2 DIABETES MELLITUS WITH COMPLICATION, WITH LONG-TERM CURRENT USE OF INSULIN (HCC): ICD-10-CM

## 2021-06-14 RX ORDER — INSULIN GLARGINE 100 [IU]/ML
INJECTION, SOLUTION SUBCUTANEOUS
Qty: 10 ML | Refills: 0 | Status: SHIPPED | OUTPATIENT
Start: 2021-06-14 | End: 2021-06-28

## 2021-06-17 ENCOUNTER — TELEPHONE (OUTPATIENT)
Dept: CARDIOLOGY | Facility: MEDICAL CENTER | Age: 51
End: 2021-06-17

## 2021-06-17 DIAGNOSIS — E78.5 HYPERLIPIDEMIA, UNSPECIFIED HYPERLIPIDEMIA TYPE: ICD-10-CM

## 2021-06-17 RX ORDER — ATORVASTATIN CALCIUM 80 MG/1
80 TABLET, FILM COATED ORAL EVERY EVENING
Qty: 90 TABLET | Refills: 0 | OUTPATIENT
Start: 2021-06-17

## 2021-06-17 RX ORDER — LISINOPRIL 10 MG/1
10 TABLET ORAL DAILY
Qty: 90 TABLET | Refills: 3 | Status: SHIPPED | OUTPATIENT
Start: 2021-06-17 | End: 2021-07-06 | Stop reason: SDUPTHER

## 2021-06-17 NOTE — TELEPHONE ENCOUNTER
----- Message from Rl Engel M.D. sent at 6/17/2021  7:37 AM PDT -----  Please the patient know that there kidney function is looking like he might be dehydrated and stay better hydrated.  Alternate explanation could be diabetic kidney disease.  Please have him follow up with their primary care physician.  We will see him back as previously scheduled.

## 2021-06-17 NOTE — TELEPHONE ENCOUNTER
Received request via: Pharmacy  6/2/2021lov  Was the patient seen in the last year in this department? Yes    Does the patient have an active prescription (recently filled or refills available) for medication(s) requested? No

## 2021-06-18 RX ORDER — DULAGLUTIDE 1.5 MG/.5ML
INJECTION, SOLUTION SUBCUTANEOUS
Qty: 2.24 ML | Status: CANCELLED | OUTPATIENT
Start: 2021-06-18

## 2021-06-18 RX ORDER — DULAGLUTIDE 1.5 MG/.5ML
0.5 INJECTION, SOLUTION SUBCUTANEOUS
Qty: 13 EACH | Refills: 3 | Status: SHIPPED | OUTPATIENT
Start: 2021-06-18 | End: 2021-09-16

## 2021-06-22 DIAGNOSIS — E11.8 TYPE 2 DIABETES MELLITUS WITH COMPLICATION, WITH LONG-TERM CURRENT USE OF INSULIN (HCC): ICD-10-CM

## 2021-06-22 DIAGNOSIS — Z79.4 TYPE 2 DIABETES MELLITUS WITH COMPLICATION, WITH LONG-TERM CURRENT USE OF INSULIN (HCC): ICD-10-CM

## 2021-06-28 ENCOUNTER — HOSPITAL ENCOUNTER (OUTPATIENT)
Dept: LAB | Facility: MEDICAL CENTER | Age: 51
End: 2021-06-28
Attending: FAMILY MEDICINE
Payer: MEDICARE

## 2021-06-28 DIAGNOSIS — D64.9 NORMOCYTIC ANEMIA: ICD-10-CM

## 2021-06-28 DIAGNOSIS — E11.65 TYPE 2 DIABETES MELLITUS WITH HYPERGLYCEMIA, WITHOUT LONG-TERM CURRENT USE OF INSULIN (HCC): ICD-10-CM

## 2021-06-28 LAB
BASOPHILS # BLD AUTO: 0.6 % (ref 0–1.8)
BASOPHILS # BLD: 0.06 K/UL (ref 0–0.12)
CREAT UR-MCNC: 187.56 MG/DL
EOSINOPHIL # BLD AUTO: 0.23 K/UL (ref 0–0.51)
EOSINOPHIL NFR BLD: 2.3 % (ref 0–6.9)
ERYTHROCYTE [DISTWIDTH] IN BLOOD BY AUTOMATED COUNT: 39.6 FL (ref 35.9–50)
EST. AVERAGE GLUCOSE BLD GHB EST-MCNC: 177 MG/DL
HBA1C MFR BLD: 7.8 % (ref 4–5.6)
HCT VFR BLD AUTO: 38.4 % (ref 42–52)
HGB BLD-MCNC: 12.6 G/DL (ref 14–18)
IMM GRANULOCYTES # BLD AUTO: 0.05 K/UL (ref 0–0.11)
IMM GRANULOCYTES NFR BLD AUTO: 0.5 % (ref 0–0.9)
LYMPHOCYTES # BLD AUTO: 2.12 K/UL (ref 1–4.8)
LYMPHOCYTES NFR BLD: 21.1 % (ref 22–41)
MCH RBC QN AUTO: 29.8 PG (ref 27–33)
MCHC RBC AUTO-ENTMCNC: 32.8 G/DL (ref 33.7–35.3)
MCV RBC AUTO: 90.8 FL (ref 81.4–97.8)
MICROALBUMIN UR-MCNC: 291.5 MG/DL
MICROALBUMIN/CREAT UR: 1554 MG/G (ref 0–30)
MONOCYTES # BLD AUTO: 0.81 K/UL (ref 0–0.85)
MONOCYTES NFR BLD AUTO: 8.1 % (ref 0–13.4)
NEUTROPHILS # BLD AUTO: 6.76 K/UL (ref 1.82–7.42)
NEUTROPHILS NFR BLD: 67.4 % (ref 44–72)
NRBC # BLD AUTO: 0 K/UL
NRBC BLD-RTO: 0 /100 WBC
PLATELET # BLD AUTO: 381 K/UL (ref 164–446)
PMV BLD AUTO: 10.7 FL (ref 9–12.9)
RBC # BLD AUTO: 4.23 M/UL (ref 4.7–6.1)
WBC # BLD AUTO: 10 K/UL (ref 4.8–10.8)

## 2021-06-28 PROCEDURE — 82570 ASSAY OF URINE CREATININE: CPT

## 2021-06-28 PROCEDURE — 83036 HEMOGLOBIN GLYCOSYLATED A1C: CPT | Mod: GA

## 2021-06-28 PROCEDURE — 36415 COLL VENOUS BLD VENIPUNCTURE: CPT | Mod: GA

## 2021-06-28 PROCEDURE — 82043 UR ALBUMIN QUANTITATIVE: CPT

## 2021-06-28 PROCEDURE — 85025 COMPLETE CBC W/AUTO DIFF WBC: CPT

## 2021-06-28 RX ORDER — INSULIN GLARGINE 100 [IU]/ML
INJECTION, SOLUTION SUBCUTANEOUS
Qty: 10 ML | Refills: 0 | Status: SHIPPED | OUTPATIENT
Start: 2021-06-28 | End: 2021-08-19

## 2021-07-01 ENCOUNTER — OFFICE VISIT (OUTPATIENT)
Dept: CARDIOLOGY | Facility: MEDICAL CENTER | Age: 51
End: 2021-07-01
Payer: MEDICARE

## 2021-07-01 VITALS
DIASTOLIC BLOOD PRESSURE: 80 MMHG | HEART RATE: 93 BPM | WEIGHT: 230.2 LBS | HEIGHT: 72 IN | BODY MASS INDEX: 31.18 KG/M2 | SYSTOLIC BLOOD PRESSURE: 108 MMHG | OXYGEN SATURATION: 96 %

## 2021-07-01 DIAGNOSIS — E11.65 TYPE 2 DIABETES MELLITUS WITH HYPERGLYCEMIA, WITHOUT LONG-TERM CURRENT USE OF INSULIN (HCC): ICD-10-CM

## 2021-07-01 DIAGNOSIS — K59.1 FUNCTIONAL DIARRHEA: ICD-10-CM

## 2021-07-01 DIAGNOSIS — E78.5 HYPERLIPIDEMIA, UNSPECIFIED HYPERLIPIDEMIA TYPE: ICD-10-CM

## 2021-07-01 DIAGNOSIS — Z89.431 S/P TRANSMETATARSAL AMPUTATION OF FOOT, RIGHT (HCC): ICD-10-CM

## 2021-07-01 DIAGNOSIS — I25.10 CORONARY ARTERY DISEASE INVOLVING NATIVE CORONARY ARTERY OF NATIVE HEART WITHOUT ANGINA PECTORIS: ICD-10-CM

## 2021-07-01 DIAGNOSIS — I21.4 NSTEMI (NON-ST ELEVATED MYOCARDIAL INFARCTION) (HCC): ICD-10-CM

## 2021-07-01 DIAGNOSIS — D64.9 NORMOCYTIC ANEMIA: ICD-10-CM

## 2021-07-01 DIAGNOSIS — I10 ESSENTIAL HYPERTENSION: ICD-10-CM

## 2021-07-01 DIAGNOSIS — N17.9 AKI (ACUTE KIDNEY INJURY) (HCC): ICD-10-CM

## 2021-07-01 PROCEDURE — 99214 OFFICE O/P EST MOD 30 MIN: CPT | Performed by: INTERNAL MEDICINE

## 2021-07-01 RX ORDER — CHOLESTYRAMINE LIGHT 4 G/5.7G
4 POWDER, FOR SUSPENSION ORAL 2 TIMES DAILY
COMMUNITY
Start: 2021-05-14 | End: 2021-09-30

## 2021-07-01 RX ORDER — SULFAMETHOXAZOLE AND TRIMETHOPRIM 800; 160 MG/1; MG/1
TABLET ORAL
COMMUNITY
Start: 2021-05-01 | End: 2021-07-02

## 2021-07-01 RX ORDER — ATORVASTATIN CALCIUM 80 MG/1
80 TABLET, FILM COATED ORAL EVERY EVENING
Qty: 90 TABLET | Refills: 3 | Status: SHIPPED | OUTPATIENT
Start: 2021-07-01 | End: 2022-06-03

## 2021-07-01 RX ORDER — EMPAGLIFLOZIN 25 MG/1
25 TABLET, FILM COATED ORAL DAILY
Qty: 30 TABLET | Refills: 11 | Status: SHIPPED | OUTPATIENT
Start: 2021-07-01 | End: 2021-07-02

## 2021-07-01 ASSESSMENT — ENCOUNTER SYMPTOMS
PALPITATIONS: 0
COUGH: 0
GASTROINTESTINAL NEGATIVE: 1
WHEEZING: 0
MUSCULOSKELETAL NEGATIVE: 1
STRIDOR: 0
CONSTITUTIONAL NEGATIVE: 1
BRUISES/BLEEDS EASILY: 0
SPUTUM PRODUCTION: 0
CARDIOVASCULAR NEGATIVE: 1
RESPIRATORY NEGATIVE: 1
ORTHOPNEA: 0
FEVER: 0
CHILLS: 0
DIZZINESS: 0
PND: 0
NEUROLOGICAL NEGATIVE: 1
SHORTNESS OF BREATH: 0
EYES NEGATIVE: 1
HEMOPTYSIS: 0
SORE THROAT: 0
CLAUDICATION: 0
LOSS OF CONSCIOUSNESS: 0
WEAKNESS: 0

## 2021-07-01 ASSESSMENT — FIBROSIS 4 INDEX: FIB4 SCORE: 0.51

## 2021-07-01 NOTE — PROGRESS NOTES
Chief Complaint   Patient presents with   • Coronary Artery Disease       Subjective:   Beni Moore is a 49 y.o. male who presents today as a follow-up for his CAD status post stent with hypertension hyperlipidemia type 2 diabetes.  Since he was last seen he stopped his dual antiplatelet therapy.  Is asking to stop his medications.  His A1c is elevated.  His blood pressure is controlled.  He said no chest pain.    Past Medical History:   Diagnosis Date   • Diabetes (HCC)    • Hypertension      Past Surgical History:   Procedure Laterality Date   • TOE AMPUTATION Right 7/2/2018    Procedure: Transmetatarsal amputation;  Surgeon: Ravi Bernardo M.D.;  Location: SURGERY [facility];  Service: Orthopedics   • ACHILLES TENDON REPAIR Right 7/2/2018    Procedure: ACHILLES LENGTHENING;  Surgeon: Ravi Bernardo M.D.;  Location: SURGERY [facility];  Service: Orthopedics   • IRRIGATION & DEBRIDEMENT ORTHO Right 7/2/2018    Procedure: IRRIGATION & DEBRIDEMENT ORTHO;  Surgeon: Ravi Bernardo M.D.;  Location: SURGERY [facility];  Service: Orthopedics   • OTHER     • OTHER ABDOMINAL SURGERY     • OTHER ORTHOPEDIC SURGERY     • TONSILLECTOMY     • VASECTOMY       Family History   Problem Relation Age of Onset   • No Known Problems Mother    • Diabetes Father    • Heart Disease Father    • Diabetes Maternal Grandmother    • Heart Disease Maternal Grandfather    • Lung Disease Paternal Grandmother    • Cancer Paternal Grandmother    • Cancer Paternal Grandfather    • Lung Cancer Paternal Grandfather      Social History     Socioeconomic History   • Marital status:      Spouse name: Not on file   • Number of children: Not on file   • Years of education: Not on file   • Highest education level: 12th grade   Occupational History   • Not on file   Tobacco Use   • Smoking status: Never Smoker   • Smokeless tobacco: Never Used   Vaping Use   • Vaping Use: Never used   Substance and Sexual Activity   • Alcohol  use: No   • Drug use: No   • Sexual activity: Not on file   Other Topics Concern   • Not on file   Social History Narrative   • Not on file     Social Determinants of Health     Financial Resource Strain: Low Risk    • Difficulty of Paying Living Expenses: Not hard at all   Food Insecurity: No Food Insecurity   • Worried About Running Out of Food in the Last Year: Never true   • Ran Out of Food in the Last Year: Never true   Transportation Needs: No Transportation Needs   • Lack of Transportation (Medical): No   • Lack of Transportation (Non-Medical): No   Physical Activity: Insufficiently Active   • Days of Exercise per Week: 3 days   • Minutes of Exercise per Session: 30 min   Stress: No Stress Concern Present   • Feeling of Stress : Not at all   Social Connections: Moderately Isolated   • Frequency of Communication with Friends and Family: More than three times a week   • Frequency of Social Gatherings with Friends and Family: More than three times a week   • Attends Zoroastrian Services: Never   • Active Member of Clubs or Organizations: No   • Attends Club or Organization Meetings: Never   • Marital Status:    Intimate Partner Violence:    • Fear of Current or Ex-Partner:    • Emotionally Abused:    • Physically Abused:    • Sexually Abused:      No Known Allergies  Outpatient Encounter Medications as of 7/1/2021   Medication Sig Dispense Refill   • cholestyramine light (PREVALITE) 4 GM/DOSE powder MIX 1 SCOOP IN APPPLESAUCE OR JUICE AND TAKE TWICE DAILY     • atorvastatin (LIPITOR) 80 MG tablet Take 1 tablet by mouth every evening. 90 tablet 3   • Empagliflozin (JARDIANCE) 25 MG Tab Take 25 mg by mouth every day. 30 tablet 11   • insulin glargine (LANTUS) 100 UNIT/ML Solution INJECT 10 TO 15 UNITS ONCE DAILY AS DIRECTED 10 mL 0   • Dulaglutide (TRULICITY) 1.5 MG/0.5ML Solution Pen-injector Inject 0.5 mL under the skin every 7 days for 90 days. 13 Each 3   • lisinopril (PRINIVIL) 10 MG Tab Take 1 tablet by  mouth every day. 90 tablet 3   • aspirin 81 MG EC tablet Take 1 tablet by mouth every day. 90 tablet 0   • glimepiride (AMARYL) 4 MG Tab Take 1 tablet by mouth every day. 90 tablet 3   • gabapentin (NEURONTIN) 100 MG Cap TAKE 1 CAPSULE BY MOUTH AT BEDTIME 90 capsule 3   • Ranibizumab (LUCENTIS IZ) by Intravitreal route.     • omeprazole (PRILOSEC) 20 MG delayed-release capsule Take 20 mg by mouth every day.     • sulfamethoxazole-trimethoprim (BACTRIM DS) 800-160 MG tablet TAKE 1 TABLET BY MOUTH TWICE DAILY FOR 14 DAYS (Patient not taking: Reported on 7/1/2021)     • [DISCONTINUED] carvedilol (COREG) 6.25 MG Tab TAKE 1 TABLET BY MOUTH TWICE DAILY WITH MEALS 180 tablet 0   • [DISCONTINUED] atorvastatin (LIPITOR) 80 MG tablet Take 1 tablet by mouth every evening. Please call [phone] to schedule a follow up visit thank you 90 tablet 0   • cholestyramine (QUESTRAN) 4 GM/DOSE powder MIX 1 SCOOP IN APPLE SAUCE OR JUICE TWICE DAILY. (Patient not taking: Reported on 7/1/2021)       No facility-administered encounter medications on file as of 7/1/2021.     Review of Systems   Constitutional: Negative.  Negative for chills, fever and malaise/fatigue.   HENT: Negative.  Negative for sore throat.    Eyes: Negative.    Respiratory: Negative.  Negative for cough, hemoptysis, sputum production, shortness of breath, wheezing and stridor.    Cardiovascular: Negative.  Negative for chest pain, palpitations, orthopnea, claudication, leg swelling and PND.   Gastrointestinal: Negative.    Genitourinary: Negative.    Musculoskeletal: Negative.    Skin: Negative.    Neurological: Negative.  Negative for dizziness, loss of consciousness and weakness.   Endo/Heme/Allergies: Negative.  Does not bruise/bleed easily.   All other systems reviewed and are negative.       Objective:   /80 (BP Location: Right arm, Patient Position: Sitting, BP Cuff Size: Adult)   Pulse 93   Ht 1.829 m (6')   Wt 104 kg (230 lb 3.2 oz)   SpO2 96%    BMI 31.22 kg/m²     Physical Exam   Constitutional: He appears well-developed. No distress.   HENT:   Head: Normocephalic and atraumatic.   Right Ear: External ear normal.   Left Ear: External ear normal.   Nose: Nose normal.   Mouth/Throat: No oropharyngeal exudate.   Eyes: Pupils are equal, round, and reactive to light. Conjunctivae are normal. Right eye exhibits no discharge. Left eye exhibits no discharge. No scleral icterus.   Neck: No JVD present.   Cardiovascular: Normal rate and regular rhythm. Exam reveals no gallop and no friction rub.   No murmur heard.  Pulmonary/Chest: Effort normal. No stridor. No respiratory distress. He has no wheezes. He has no rales. He exhibits no tenderness.   Abdominal: Soft. He exhibits no distension. There is no guarding.   Musculoskeletal:         General: No tenderness or deformity. Normal range of motion.      Cervical back: Neck supple.   Neurological: He is alert. He has normal reflexes. He displays normal reflexes. No cranial nerve deficit. He exhibits normal muscle tone. Coordination normal.   Skin: Skin is warm and dry. No rash noted. He is not diaphoretic. No erythema. No pallor.   Psychiatric: His behavior is normal. Judgment and thought content normal.   Nursing note and vitals reviewed.    Echocardiogram: Dated 9/24/2019 personally viewed interpreted by myself showing normal LV systolic function no valvular heart disease.    Stress test: Dated 9/21/2019 personally viewed interpreted by myself showing    Lab Results   Component Value Date/Time    CHOLSTRLTOT 79 (L) 06/02/2021 06:13 AM    LDL 26 06/02/2021 06:13 AM    HDL 35 (A) 06/02/2021 06:13 AM    TRIGLYCERIDE 88 06/02/2021 06:13 AM       Lab Results   Component Value Date/Time    SODIUM 136 06/09/2021 08:31 PM    POTASSIUM 4.5 06/09/2021 08:31 PM    CHLORIDE 101 06/09/2021 08:31 PM    CO2 26 06/09/2021 08:31 PM    GLUCOSE 147 (H) 06/09/2021 08:31 PM    BUN 17 06/09/2021 08:31 PM    CREATININE 1.32 06/09/2021  08:31 PM     Lab Results   Component Value Date/Time    ALKPHOSPHAT 63 06/02/2021 06:13 AM    ASTSGOT 20 06/02/2021 06:13 AM    ALTSGPT 29 06/02/2021 06:13 AM    TBILIRUBIN 1.3 06/02/2021 06:13 AM        Assessment:     1. AXEL (acute kidney injury) (Formerly [facility])     2. Coronary artery disease involving native coronary artery of native heart without angina pectoris     3. Essential hypertension  Empagliflozin (JARDIANCE) 25 MG Tab   4. Functional diarrhea     5. Normocytic anemia  Empagliflozin (JARDIANCE) 25 MG Tab   6. NSTEMI (non-ST elevated myocardial infarction) (Formerly [facility])  Empagliflozin (JARDIANCE) 25 MG Tab   7. S/P transmetatarsal amputation of foot, right (Formerly [facility])     8. Type 2 diabetes mellitus with hyperglycemia, without long-term current use of insulin (Formerly [facility])  Empagliflozin (JARDIANCE) 25 MG Tab   9. Hyperlipidemia, unspecified hyperlipidemia type  atorvastatin (LIPITOR) 80 MG tablet       Medical Decision Making:  Today's Assessment / Status / Plan:     49-year-old male on dual antiplatelet therapy for ACS status post stent.  For his diabetes and heart disease I will start him on Jardiance at 25 mg/day.  Check his insurance this appears to be top tier with no copayment.  We will keep on atorvastatin.  Is reasonable stop the carvedilol given his blood pressure today.  I will keep him on the lisinopril.  We will see him back in 6 months.

## 2021-07-02 ENCOUNTER — OFFICE VISIT (OUTPATIENT)
Dept: MEDICAL GROUP | Facility: PHYSICIAN GROUP | Age: 51
End: 2021-07-02
Payer: MEDICARE

## 2021-07-02 VITALS
DIASTOLIC BLOOD PRESSURE: 70 MMHG | SYSTOLIC BLOOD PRESSURE: 120 MMHG | OXYGEN SATURATION: 98 % | RESPIRATION RATE: 18 BRPM | TEMPERATURE: 98.8 F | BODY MASS INDEX: 30.94 KG/M2 | HEART RATE: 89 BPM | HEIGHT: 72 IN | WEIGHT: 228.4 LBS

## 2021-07-02 DIAGNOSIS — E11.65 TYPE 2 DIABETES MELLITUS WITH HYPERGLYCEMIA, WITHOUT LONG-TERM CURRENT USE OF INSULIN (HCC): ICD-10-CM

## 2021-07-02 DIAGNOSIS — T25.221D PARTIAL THICKNESS BURN OF RIGHT FOOT, SUBSEQUENT ENCOUNTER: ICD-10-CM

## 2021-07-02 DIAGNOSIS — D64.9 NORMOCYTIC ANEMIA: ICD-10-CM

## 2021-07-02 PROBLEM — T25.221A PARTIAL THICKNESS BURN OF RIGHT FOOT: Status: ACTIVE | Noted: 2021-07-02

## 2021-07-02 PROCEDURE — 99214 OFFICE O/P EST MOD 30 MIN: CPT | Performed by: FAMILY MEDICINE

## 2021-07-02 ASSESSMENT — FIBROSIS 4 INDEX: FIB4 SCORE: 0.51
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
## 2021-07-06 ENCOUNTER — OFFICE VISIT (OUTPATIENT)
Dept: WOUND CARE | Facility: MEDICAL CENTER | Age: 51
End: 2021-07-06
Attending: FAMILY MEDICINE
Payer: MEDICARE

## 2021-07-06 VITALS
TEMPERATURE: 97.3 F | SYSTOLIC BLOOD PRESSURE: 144 MMHG | DIASTOLIC BLOOD PRESSURE: 94 MMHG | RESPIRATION RATE: 17 BRPM | HEART RATE: 85 BPM | OXYGEN SATURATION: 100 %

## 2021-07-06 DIAGNOSIS — S91.301A OPEN WOUND OF RIGHT HEEL, INITIAL ENCOUNTER: Primary | ICD-10-CM

## 2021-07-06 DIAGNOSIS — E08.42 DIABETIC POLYNEUROPATHY ASSOCIATED WITH DIABETES MELLITUS DUE TO UNDERLYING CONDITION (HCC): ICD-10-CM

## 2021-07-06 DIAGNOSIS — E11.65 TYPE 2 DIABETES MELLITUS WITH HYPERGLYCEMIA, WITHOUT LONG-TERM CURRENT USE OF INSULIN (HCC): ICD-10-CM

## 2021-07-06 PROCEDURE — 99213 OFFICE O/P EST LOW 20 MIN: CPT | Mod: 25 | Performed by: NURSE PRACTITIONER

## 2021-07-06 PROCEDURE — 11042 DBRDMT SUBQ TIS 1ST 20SQCM/<: CPT | Performed by: NURSE PRACTITIONER

## 2021-07-06 PROCEDURE — 99214 OFFICE O/P EST MOD 30 MIN: CPT | Mod: 25

## 2021-07-06 PROCEDURE — 11042 DBRDMT SUBQ TIS 1ST 20SQCM/<: CPT

## 2021-07-06 RX ORDER — LISINOPRIL 10 MG/1
10 TABLET ORAL DAILY
Qty: 90 TABLET | Refills: 3 | Status: SHIPPED | OUTPATIENT
Start: 2021-07-06 | End: 2021-08-31

## 2021-07-06 ASSESSMENT — ENCOUNTER SYMPTOMS
DIZZINESS: 0
WHEEZING: 0
CONSTIPATION: 0
FEVER: 0
PALPITATIONS: 0
DIARRHEA: 0
VOMITING: 0
HEADACHES: 0
BLURRED VISION: 0
SHORTNESS OF BREATH: 0
NAUSEA: 0
CHILLS: 0
DOUBLE VISION: 0

## 2021-07-06 NOTE — PATIENT INSTRUCTIONS
-Keep your wound dressing clean, dry, and intact.    -Change your dressing if it becomes soiled, soaked, or falls off.    -Should you experience any significant changes in your wound(s), such as infection (redness, swelling, localized heat, increased pain, fever > 101 F, chills) or have any questions regarding your home care instructions, please contact the wound center at [phone]. If after hours, contact your primary care physician or go to the hospital emergency room.

## 2021-07-06 NOTE — NON-PROVIDER
[employer] Supply Order:          Wound 07/06/21 right plantar heel (Active)   Wound Image    07/06/21 1000   Site Assessment Pink;Red 07/06/21 1000   Periwound Assessment Callused;Maceration 07/06/21 1000   Margins Attached edges 07/06/21 1000   Drainage Amount Moderate 07/06/21 1000   Drainage Description Serosanguineous 07/06/21 1000   Treatments Cleansed;Provider debridement;Site care 07/06/21 1000   Wound Cleansing Puracyn [place] 07/06/21 1000   Periwound Protectant Skin Protectant Wipes to Periwound;Barrier Paste 07/06/21 1000   Dressing Cleansing/Solutions Not Applicable 07/06/21 1000   Dressing Options Hydrofiber Silver;Hydrofiber;Mepilex Heel;Tubigrip 07/06/21 1000   Dressing Changed New 07/06/21 1000   Dressing Status Clean;Dry;Intact 07/06/21 1000   Non-staged Wound Description Full thickness 07/06/21 1000   Wound Length (cm) 4.9 cm 07/06/21 1000   Wound Width (cm) 3.2 cm 07/06/21 1000   Wound Depth (cm) 0.2 cm 07/06/21 1000   Wound Surface Area (cm^2) 15.68 cm^2 07/06/21 1000   Wound Volume (cm^3) 3.14 cm^3 07/06/21 1000   Post-Procedure Length (cm) 4.9 cm 07/06/21 1000   Post-Procedure Width (cm) 3.2 cm 07/06/21 1000   Post-Procedure Depth (cm) 0.2 cm 07/06/21 1000   Post-Procedure Surface Area (cm^2) 15.68 cm^2 07/06/21 1000   Post-Procedure Volume (cm^3) 3.14 cm^3 07/06/21 1000   Tunneling (cm) 0 cm 07/06/21 1000   Undermining (cm) 0 cm 07/06/21 1000   Wound Odor None 07/06/21 1000   Exposed Structures None 07/06/21 1000

## 2021-07-07 NOTE — PROGRESS NOTES
Provider Encounter- Diabetic Foot Ulcer      HISTORY OF PRESENT ILLNESS  Wound History:    START OF CARE IN CLINIC: 7/6/2021    REFERRING PROVIDER: Florinda Pascual M.D.   WOUND- Diabetic foot ulcer   LOCATION: Right plantar heel   HISTORY: Patient reports that he was walking outside without any foot protection or shoes on.  Patient walked on the concrete and developed a blister to the right heel.  The left heel has gone on to heal however the right heel is a full-thickness wound therefore the patient was sent to [facility] for care of this wound.    Pertinent Medical History: Type 2 diabetes, TMA right foot, non-STEMI, hypertension, coronary artery disease, hearing loss           TOBACCO USE: Patient denies the use of tobacco or smokeless tobacco products    Patient's problem list, allergies, and current medications reviewed and updated in Epic    Interval History:  7/6/2021: Clinic visit with ELVIE Yarbrough. Patient states that they are feeling well today.  Patient denies fever, chills, nausea, vomiting, lightheadedness, dizziness, shortness of breath and chest pain.  Periwound callus taken down in clinic today.  Entire wound debrided in clinic today.  Patient given a prescription to take to ability for an offloading boot to offload the heel.      REVIEW OF SYSTEMS:   Review of Systems   Constitutional: Negative for chills and fever.   HENT: Positive for hearing loss.    Eyes: Negative for blurred vision and double vision.   Respiratory: Negative for shortness of breath and wheezing.    Cardiovascular: Negative for chest pain, palpitations and leg swelling.   Gastrointestinal: Negative for constipation, diarrhea, nausea and vomiting.   Skin: Negative for itching and rash.        Wound to right heel   Neurological: Negative for dizziness and headaches.       PHYSICAL EXAMINATION:   /94   Pulse 85   Temp 36.3 °C (97.3 °F)   Resp 17   SpO2 100%     Physical Exam  Constitutional:       Appearance: Normal  appearance.   HENT:      Head: Normocephalic and atraumatic.      Ears:      Comments: Hearing loss     Nose: Nose normal.      Mouth/Throat:      Mouth: Mucous membranes are moist.   Eyes:      Pupils: Pupils are equal, round, and reactive to light.   Cardiovascular:      Pulses: Normal pulses.   Pulmonary:      Effort: No respiratory distress.      Breath sounds: No wheezing.   Musculoskeletal:         General: Signs of injury present.      Cervical back: Normal range of motion.      Comments: TMA right foot   Skin:     Comments: Full-thickness injury right heel   Neurological:      Mental Status: He is alert and oriented to person, place, and time.      Comments: Patient insensate bilateral feet   Psychiatric:         Mood and Affect: Mood normal.         Behavior: Behavior normal.         WOUND ASSESSMENT  Wound 07/06/21 right plantar heel (Active)   Wound Image    07/06/21 1000   Site Assessment Pink;Red 07/06/21 1000   Periwound Assessment Callused;Maceration 07/06/21 1000   Margins Attached edges 07/06/21 1000   Drainage Amount Moderate 07/06/21 1000   Drainage Description Serosanguineous 07/06/21 1000   Treatments Cleansed;Provider debridement;Site care 07/06/21 1000   Wound Cleansing Puracyn [place] 07/06/21 1000   Periwound Protectant Skin Protectant Wipes to Periwound;Barrier Paste 07/06/21 1000   Dressing Cleansing/Solutions Not Applicable 07/06/21 1000   Dressing Options Hydrofiber Silver;Hydrofiber;Mepilex Heel;Tubigrip 07/06/21 1000   Dressing Changed New 07/06/21 1000   Dressing Status Clean;Dry;Intact 07/06/21 1000   Non-staged Wound Description Full thickness 07/06/21 1000   Wound Length (cm) 4.9 cm 07/06/21 1000   Wound Width (cm) 3.2 cm 07/06/21 1000   Wound Depth (cm) 0.2 cm 07/06/21 1000   Wound Surface Area (cm^2) 15.68 cm^2 07/06/21 1000   Wound Volume (cm^3) 3.14 cm^3 07/06/21 1000   Post-Procedure Length (cm) 4.9 cm 07/06/21 1000   Post-Procedure Width (cm) 3.2 cm 07/06/21 1000    Post-Procedure Depth (cm) 0.2 cm 07/06/21 1000   Post-Procedure Surface Area (cm^2) 15.68 cm^2 07/06/21 1000   Post-Procedure Volume (cm^3) 3.14 cm^3 07/06/21 1000   Tunneling (cm) 0 cm 07/06/21 1000   Undermining (cm) 0 cm 07/06/21 1000   Wound Odor None 07/06/21 1000   Exposed Structures None 07/06/21 1000   Number of days: 0       PROCEDURE:   -2% viscous lidocaine applied topically to wound bed for approximately 5 minutes prior to debridement  -Curette used to debride wound bed.  Excisional debridement was performed to remove devitalized tissue until healthy, bleeding tissue was visualized.   Entire surface of wound, 15.68cm2 debrided.  Tissue debrided into the subcutaneous layer.    -Bleeding controlled with manual pressure.    -Wound care completed by wound RN, refer to flowsheet  -Patient tolerated the procedure well, without c/o pain or discomfort.       Pertinent Labs and Diagnostics:    Labs:     A1c:   Lab Results   Component Value Date/Time    HBA1C 7.8 (H) 06/28/2021 08:27 AM          IMAGING: No results found.    VASCULAR STUDIES: No results found.    LAST  WOUND CULTURE: No results found for: CULTRSULT          ASSESSMENT AND PLAN:   1. Open wound of right heel, initial encounter  -Excisional debridement of wound in clinic today, medically necessary to promote wound healing.  -Patient to return to clinic weekly for assessment and debridement  -Patient to change dressing 1-2 times per week in between clinic visits   Wound care: Hydrofiber silver, Hydrofiber, Mepilex heel, Tubigrip E    2. Type 2 diabetes mellitus with hyperglycemia, without long-term current use of insulin (HCC)  Patient instructed keep tight control over fasting blood sugar FBS, <140 for optimal wound healing; Implications of loss of protective sensation (LOPS) discussed with patient, including increased risk for amputation.  Advised to check feet at least daily, moisturize feet, and to always wear protective foot wear, arrange  meticulous regular foot care by podiatrist or [facility]. Pt with good understanding.       3. Diabetic polyneuropathy associated with diabetes mellitus due to underlying condition (HCC)  Comments: Contributing factor patient insensate bilateral plantar feet        PATIENT EDUCATION  - Importance of tight glucose control for wound healing   - Implications of loss of protective sensation (LOPS) discussed with patient- including increased risk for amputation.  - Advised to check feet at least daily, moisturize feet, and to always wear protective foot wear.   -  Importance of offloading foot to assist with wound healing  - Advised pt not to trim nails or calluses, seek foot/nail care from podiatrist or certified foot/nail nurse  - Importance of adequate nutrition for wound healing    20 min spent face to face with patient, >50% of time spent counseling, coordinating care, reviewing records, discussing POC, educating patient regarding wound healing and progressions, diabetes education.  This time was spent in excess to procedure time.       Please note that this note may have been created using voice recognition software. I have worked with technical experts from [employer] to optimize the interface.  I have made every reasonable attempt to correct obvious errors, but there may be errors of grammar and possibly content that I did not discover before finalizing the note.

## 2021-07-13 ENCOUNTER — OFFICE VISIT (OUTPATIENT)
Dept: WOUND CARE | Facility: MEDICAL CENTER | Age: 51
End: 2021-07-13
Attending: FAMILY MEDICINE
Payer: MEDICARE

## 2021-07-13 VITALS
DIASTOLIC BLOOD PRESSURE: 82 MMHG | TEMPERATURE: 98.1 F | SYSTOLIC BLOOD PRESSURE: 115 MMHG | RESPIRATION RATE: 20 BRPM | HEART RATE: 68 BPM | OXYGEN SATURATION: 96 %

## 2021-07-13 DIAGNOSIS — E11.65 TYPE 2 DIABETES MELLITUS WITH HYPERGLYCEMIA, WITHOUT LONG-TERM CURRENT USE OF INSULIN (HCC): ICD-10-CM

## 2021-07-13 DIAGNOSIS — E08.42 DIABETIC POLYNEUROPATHY ASSOCIATED WITH DIABETES MELLITUS DUE TO UNDERLYING CONDITION (HCC): ICD-10-CM

## 2021-07-13 DIAGNOSIS — S91.301A OPEN WOUND OF RIGHT HEEL, INITIAL ENCOUNTER: Primary | ICD-10-CM

## 2021-07-13 PROCEDURE — 11042 DBRDMT SUBQ TIS 1ST 20SQCM/<: CPT | Performed by: NURSE PRACTITIONER

## 2021-07-13 PROCEDURE — 11042 DBRDMT SUBQ TIS 1ST 20SQCM/<: CPT

## 2021-07-13 ASSESSMENT — ENCOUNTER SYMPTOMS
CONSTIPATION: 0
BLURRED VISION: 0
WHEEZING: 0
CHILLS: 0
DIARRHEA: 0
VOMITING: 0
DIZZINESS: 0
SHORTNESS OF BREATH: 0
NAUSEA: 0
DOUBLE VISION: 0
HEADACHES: 0
FEVER: 0
PALPITATIONS: 0

## 2021-07-13 ASSESSMENT — PAIN SCALES - GENERAL: PAINLEVEL: NO PAIN

## 2021-07-13 NOTE — PROGRESS NOTES
Provider Encounter- Diabetic Foot Ulcer      HISTORY OF PRESENT ILLNESS  Wound History:    START OF CARE IN CLINIC: 7/6/2021    REFERRING PROVIDER: Florinda Pascual M.D.   WOUND- Diabetic foot ulcer   LOCATION: Right plantar heel   HISTORY: Patient reports that he was walking outside without any foot protection or shoes on.  Patient walked on the concrete and developed a blister to the right heel.  The left heel has gone on to heal however the right heel is a full-thickness wound therefore the patient was sent to [facility] for care of this wound.    Pertinent Medical History: Type 2 diabetes, TMA right foot, non-STEMI, hypertension, coronary artery disease, hearing loss           TOBACCO USE: Patient denies the use of tobacco or smokeless tobacco products    Patient's problem list, allergies, and current medications reviewed and updated in Epic    Interval History:  7/6/2021: Clinic visit with ELVIE Yarbrough. Patient states that they are feeling well today.  Patient denies fever, chills, nausea, vomiting, lightheadedness, dizziness, shortness of breath and chest pain.  Periwound callus taken down in clinic today.  Entire wound debrided in clinic today.  Patient given a prescription to take to ability for an offloading boot to offload the heel.    7/13/2021: Clinic visit with ELVIE Yarbrough. Patient states that they are feeling well today.  Patient denies fever, chills, nausea, vomiting, lightheadedness, dizziness, shortness of breath and chest pain.  Superior aspect of wound is progressing there is exposed subcutaneous tissue to the inferior aspect.  Collagen placed to support granulation tissue over exposed subcutaneous tissue.  Periwound rolled edges taken down in clinic.      REVIEW OF SYSTEMS:   Review of Systems   Constitutional: Negative for chills and fever.   HENT: Positive for hearing loss.    Eyes: Negative for blurred vision and double vision.   Respiratory: Negative for shortness of breath and  wheezing.    Cardiovascular: Negative for chest pain, palpitations and leg swelling.   Gastrointestinal: Negative for constipation, diarrhea, nausea and vomiting.   Skin: Negative for itching and rash.        Wound to right heel   Neurological: Negative for dizziness and headaches.       PHYSICAL EXAMINATION:   /82 (BP Location: Left arm, Patient Position: Sitting, BP Cuff Size: Adult)   Pulse 68   Temp 36.7 °C (98.1 °F) (Temporal)   Resp 20   SpO2 96%     Physical Exam  Constitutional:       Appearance: Normal appearance.   HENT:      Head: Normocephalic and atraumatic.      Ears:      Comments: Hearing loss     Nose: Nose normal.      Mouth/Throat:      Mouth: Mucous membranes are moist.   Eyes:      Pupils: Pupils are equal, round, and reactive to light.   Cardiovascular:      Pulses: Normal pulses.   Pulmonary:      Effort: No respiratory distress.      Breath sounds: No wheezing.   Musculoskeletal:         General: Signs of injury present.      Cervical back: Normal range of motion.      Comments: TMA right foot   Skin:     Comments: Full-thickness injury right heel   Neurological:      Mental Status: He is alert and oriented to person, place, and time.      Comments: Patient insensate bilateral feet   Psychiatric:         Mood and Affect: Mood normal.         Behavior: Behavior normal.         WOUND ASSESSMENT  Wound 07/06/21 right plantar heel (Active)   Wound Image   07/13/21 0916   Site Assessment Pink;Red 07/13/21 0901   Periwound Assessment Callused;Maceration 07/13/21 0901   Margins Attached edges 07/13/21 0901   Closure Secondary intention 07/13/21 0901   Drainage Amount Moderate 07/13/21 0901   Drainage Description Serosanguineous 07/13/21 0901   Treatments Cleansed;Provider debridement;Silver nitrate 07/13/21 0901   Wound Cleansing Normal Saline Irrigation 07/13/21 0901   Periwound Protectant Skin Protectant Wipes to Periwound;Barrier Paste 07/13/21 0901   Dressing Cleansing/Solutions Not  Applicable 07/13/21 0901   Dressing Options Hydrofiber Silver;Mepilex Heel;Tubigrip;Collagen Dressing 07/13/21 0901   Dressing Changed Changed 07/13/21 0901   Dressing Status Clean;Dry;Intact 07/06/21 1000   Dressing Change/Treatment Frequency Every 72 hrs, and As Needed 07/13/21 0901   Non-staged Wound Description Full thickness 07/13/21 0901   Wound Length (cm) 4 cm 07/13/21 0901   Wound Width (cm) 2.7 cm 07/13/21 0901   Wound Depth (cm) 0.3 cm 07/13/21 0901   Wound Surface Area (cm^2) 10.8 cm^2 07/13/21 0901   Wound Volume (cm^3) 3.24 cm^3 07/13/21 0901   Post-Procedure Length (cm) 4.1 cm 07/13/21 0901   Post-Procedure Width (cm) 3 cm 07/13/21 0901   Post-Procedure Depth (cm) 0.3 cm 07/13/21 0901   Post-Procedure Surface Area (cm^2) 12.3 cm^2 07/13/21 0901   Post-Procedure Volume (cm^3) 3.69 cm^3 07/13/21 0901   Wound Healing % -3 07/13/21 0901   Wound Bed Granulation (%) 90 % 07/13/21 0901   Tunneling (cm) 0 cm 07/13/21 0901   Undermining (cm) 0 cm 07/13/21 0901   Wound Odor None 07/13/21 0901   Pulses 1+;Right;DP;PT 07/13/21 0901   Exposed Structures Adipose 07/13/21 0901   Number of days: 7       PROCEDURE:   -2% viscous lidocaine applied topically to wound bed for approximately 5 minutes prior to debridement  -Curette used to debride wound bed.  Excisional debridement was performed to remove devitalized tissue until healthy, bleeding tissue was visualized.   Entire surface of wound, 12.3 cm2 debrided.  Tissue debrided into the subcutaneous layer.    -Bleeding controlled with manual pressure.    -Wound care completed by wound RN, refer to flowsheet  -Patient tolerated the procedure well, without c/o pain or discomfort.       Pertinent Labs and Diagnostics:    Labs:     A1c:   Lab Results   Component Value Date/Time    HBA1C 7.8 (H) 06/28/2021 08:27 AM          IMAGING: No results found.    VASCULAR STUDIES: No results found.    LAST  WOUND CULTURE: No results found for: CULTRSULT          ASSESSMENT AND PLAN:    1. Open wound of right heel, initial encounter  -Excisional debridement of wound in clinic today, medically necessary to promote wound healing.  -Patient to return to clinic weekly for assessment and debridement  -Patient to change dressing 1-2 times per week in between clinic visits   Wound care: Collagen dressing, Hydrofiber silver, Mepilex heel, Tubigrip E    2. Type 2 diabetes mellitus with hyperglycemia, without long-term current use of insulin ([facility])  Patient instructed keep tight control over fasting blood sugar FBS, <140 for optimal wound healing; Implications of loss of protective sensation (LOPS) discussed with patient, including increased risk for amputation.  Advised to check feet at least daily, moisturize feet, and to always wear protective foot wear, arrange meticulous regular foot care by podiatrist or [facility]. Pt with good understanding.        3. Diabetic polyneuropathy associated with diabetes mellitus due to underlying condition ([facility])  Comments: Contributing factor patient insensate bilateral plantar feet        PATIENT EDUCATION  - Importance of tight glucose control for wound healing   - Implications of loss of protective sensation (LOPS) discussed with patient- including increased risk for amputation.  - Advised to check feet at least daily, moisturize feet, and to always wear protective foot wear.   -  Importance of offloading foot to assist with wound healing  - Advised pt not to trim nails or calluses, seek foot/nail care from podiatrist or certified foot/nail nurse  - Importance of adequate nutrition for wound healing        Please note that this note may have been created using voice recognition software. I have worked with technical experts from [facility] [email or web address] to optimize the interface.  I have made every reasonable attempt to correct obvious errors, but there may be errors of grammar and possibly content that I did not discover before finalizing the note.

## 2021-07-13 NOTE — PATIENT INSTRUCTIONS
Should you experience any significant changes in your wound(s) such as infection (redness, swelling, localized heat, increased pain, fever >101 F, chills) or have any questions regarding your home care instructions, please contact the wound center [phone]. If after hours, contact your primary care physician or go the hospital emergency room.  Keep dressing clean and dry and cover while bathing. Only change dressing if over saturated, soiled or its falling off or every 72 hours as demonstrated during your appoitment.   Keep your blood sugars closely managed and wear your offloading boot for every step you take.

## 2021-07-16 ENCOUNTER — OFFICE VISIT (OUTPATIENT)
Dept: MEDICAL GROUP | Facility: PHYSICIAN GROUP | Age: 51
End: 2021-07-16
Payer: MEDICARE

## 2021-07-16 ENCOUNTER — HOSPITAL ENCOUNTER (OUTPATIENT)
Dept: LAB | Facility: MEDICAL CENTER | Age: 51
End: 2021-07-16
Attending: FAMILY MEDICINE
Payer: MEDICARE

## 2021-07-16 VITALS
TEMPERATURE: 98.6 F | HEART RATE: 92 BPM | DIASTOLIC BLOOD PRESSURE: 84 MMHG | HEIGHT: 72 IN | RESPIRATION RATE: 16 BRPM | SYSTOLIC BLOOD PRESSURE: 134 MMHG | OXYGEN SATURATION: 98 % | WEIGHT: 229.8 LBS | BODY MASS INDEX: 31.13 KG/M2

## 2021-07-16 DIAGNOSIS — D64.9 NORMOCYTIC ANEMIA: ICD-10-CM

## 2021-07-16 DIAGNOSIS — E11.41: ICD-10-CM

## 2021-07-16 DIAGNOSIS — E11.65 TYPE 2 DIABETES MELLITUS WITH HYPERGLYCEMIA, WITHOUT LONG-TERM CURRENT USE OF INSULIN (HCC): ICD-10-CM

## 2021-07-16 DIAGNOSIS — G57.90: ICD-10-CM

## 2021-07-16 PROBLEM — Z79.4 CONTROLLED TYPE 2 DIABETES MELLITUS, WITH LONG-TERM CURRENT USE OF INSULIN (HCC): Status: ACTIVE | Noted: 2021-07-16

## 2021-07-16 PROBLEM — E11.9 CONTROLLED TYPE 2 DIABETES MELLITUS, WITH LONG-TERM CURRENT USE OF INSULIN (HCC): Status: ACTIVE | Noted: 2021-07-16

## 2021-07-16 LAB
BASOPHILS # BLD AUTO: 0.6 % (ref 0–1.8)
BASOPHILS # BLD: 0.05 K/UL (ref 0–0.12)
EOSINOPHIL # BLD AUTO: 0.18 K/UL (ref 0–0.51)
EOSINOPHIL NFR BLD: 2.1 % (ref 0–6.9)
ERYTHROCYTE [DISTWIDTH] IN BLOOD BY AUTOMATED COUNT: 40.6 FL (ref 35.9–50)
FERRITIN SERPL-MCNC: 277 NG/ML (ref 22–322)
HCT VFR BLD AUTO: 38.3 % (ref 42–52)
HGB BLD-MCNC: 12.9 G/DL (ref 14–18)
IMM GRANULOCYTES # BLD AUTO: 0.05 K/UL (ref 0–0.11)
IMM GRANULOCYTES NFR BLD AUTO: 0.6 % (ref 0–0.9)
IRON SATN MFR SERPL: 30 % (ref 15–55)
IRON SERPL-MCNC: 77 UG/DL (ref 50–180)
LYMPHOCYTES # BLD AUTO: 1.97 K/UL (ref 1–4.8)
LYMPHOCYTES NFR BLD: 22.7 % (ref 22–41)
MCH RBC QN AUTO: 30.1 PG (ref 27–33)
MCHC RBC AUTO-ENTMCNC: 33.7 G/DL (ref 33.7–35.3)
MCV RBC AUTO: 89.3 FL (ref 81.4–97.8)
MONOCYTES # BLD AUTO: 0.66 K/UL (ref 0–0.85)
MONOCYTES NFR BLD AUTO: 7.6 % (ref 0–13.4)
NEUTROPHILS # BLD AUTO: 5.75 K/UL (ref 1.82–7.42)
NEUTROPHILS NFR BLD: 66.4 % (ref 44–72)
NRBC # BLD AUTO: 0 K/UL
NRBC BLD-RTO: 0 /100 WBC
PLATELET # BLD AUTO: 259 K/UL (ref 164–446)
PMV BLD AUTO: 10.6 FL (ref 9–12.9)
RBC # BLD AUTO: 4.29 M/UL (ref 4.7–6.1)
TIBC SERPL-MCNC: 255 UG/DL (ref 250–450)
UIBC SERPL-MCNC: 178 UG/DL (ref 110–370)
WBC # BLD AUTO: 8.7 K/UL (ref 4.8–10.8)

## 2021-07-16 PROCEDURE — 85025 COMPLETE CBC W/AUTO DIFF WBC: CPT

## 2021-07-16 PROCEDURE — 83540 ASSAY OF IRON: CPT

## 2021-07-16 PROCEDURE — 99213 OFFICE O/P EST LOW 20 MIN: CPT | Performed by: FAMILY MEDICINE

## 2021-07-16 PROCEDURE — 36415 COLL VENOUS BLD VENIPUNCTURE: CPT

## 2021-07-16 PROCEDURE — 83550 IRON BINDING TEST: CPT

## 2021-07-16 PROCEDURE — 82728 ASSAY OF FERRITIN: CPT

## 2021-07-16 ASSESSMENT — FIBROSIS 4 INDEX: FIB4 SCORE: 0.51

## 2021-07-20 ENCOUNTER — NON-PROVIDER VISIT (OUTPATIENT)
Dept: WOUND CARE | Facility: MEDICAL CENTER | Age: 51
End: 2021-07-20
Attending: FAMILY MEDICINE
Payer: MEDICARE

## 2021-07-20 PROCEDURE — 97597 DBRDMT OPN WND 1ST 20 CM/<: CPT

## 2021-07-20 NOTE — PATIENT INSTRUCTIONS
-Keep dressings clean and dry. Change dressings once between wound clinic visits, and if the dressings become saturated, soiled, or fall off.    -Avoid prolonged standing or sitting without elevating your legs.    -Remove your compression garments if you have severe pain, severe swelling, numbness, color change, or temperature change in your toes. If you need to remove your compression garments, do so by unrolling them. Do not cut the compression garments off, this is to prevent cutting yourself on accident.    -Wear your offloading boot any time you are up walking.    -Never walk around the house barefoot. Always wear a rubber soled slipper when walking around the house.    -Should you experience any significant changes in your wound(s), such as signs of infection (redness, swelling, localized heat, increased pain, fever > 101 F, chills) or have any questions regarding your home care instructions, please contact the wound center at [phone]. If after hours, contact your primary care physician or go to the hospital emergency room.     -If you are 5 or more minutes late for an appointment, we reserve the right to cancel and reschedule that appointment. Additionally, if you are habitually late or not showing (3 late cancellations and/or no shows), we reserve the right to cancel your remaining appointments and it will be your responsibility to obtain a new referral if services are still needed.

## 2021-07-27 ENCOUNTER — NON-PROVIDER VISIT (OUTPATIENT)
Dept: WOUND CARE | Facility: MEDICAL CENTER | Age: 51
End: 2021-07-27
Attending: FAMILY MEDICINE
Payer: MEDICARE

## 2021-07-27 PROCEDURE — 97597 DBRDMT OPN WND 1ST 20 CM/<: CPT

## 2021-07-27 NOTE — PROCEDURES
CSWD using curette to remove <1cm2 nonviable tissue from wound bed. Patient tolerated well; denied pain.

## 2021-07-27 NOTE — PATIENT INSTRUCTIONS
-Keep your wound dressing clean, dry, and intact.    -Change your dressing every 3 to 4 days or if it becomes soiled, soaked, or falls off.    -Should you experience any significant changes in your wound(s), such as infection (redness, swelling, localized heat, increased pain, fever > 101 F, chills) or have any questions regarding your home care instructions, please contact the wound center at [phone]. If after hours, contact your primary care physician or go to the hospital emergency room.

## 2021-07-28 ENCOUNTER — APPOINTMENT (OUTPATIENT)
Dept: MEDICAL GROUP | Facility: PHYSICIAN GROUP | Age: 51
End: 2021-07-28
Payer: MEDICARE

## 2021-08-16 ENCOUNTER — DOCUMENTATION (OUTPATIENT)
Dept: VASCULAR LAB | Facility: MEDICAL CENTER | Age: 51
End: 2021-08-16

## 2021-08-16 NOTE — PROGRESS NOTES
Pt missed their DM pharmacotherapy appointment on 8/13 d/t COVID infection.    Pt would like a call back in 2 weeks to reschedule f/u appt    Malorie Alvarado, KristinD

## 2021-08-19 ENCOUNTER — HOSPITAL ENCOUNTER (OUTPATIENT)
Facility: MEDICAL CENTER | Age: 51
End: 2021-08-21
Attending: EMERGENCY MEDICINE | Admitting: STUDENT IN AN ORGANIZED HEALTH CARE EDUCATION/TRAINING PROGRAM
Payer: MEDICARE

## 2021-08-19 ENCOUNTER — APPOINTMENT (OUTPATIENT)
Dept: RADIOLOGY | Facility: MEDICAL CENTER | Age: 51
End: 2021-08-19
Attending: EMERGENCY MEDICINE
Payer: MEDICARE

## 2021-08-19 DIAGNOSIS — R55 SYNCOPE, UNSPECIFIED SYNCOPE TYPE: ICD-10-CM

## 2021-08-19 DIAGNOSIS — I95.1 ORTHOSTATIC HYPOTENSION: ICD-10-CM

## 2021-08-19 DIAGNOSIS — E16.2 HYPOGLYCEMIA: ICD-10-CM

## 2021-08-19 DIAGNOSIS — U07.1 COVID-19: ICD-10-CM

## 2021-08-19 PROBLEM — E87.1 HYPONATREMIA: Status: ACTIVE | Noted: 2021-08-19

## 2021-08-19 PROBLEM — R79.89 ELEVATED TROPONIN: Status: ACTIVE | Noted: 2021-08-19

## 2021-08-19 PROBLEM — A41.9 SEPSIS (HCC): Status: ACTIVE | Noted: 2021-08-19

## 2021-08-19 LAB
ALBUMIN SERPL BCP-MCNC: 3.1 G/DL (ref 3.2–4.9)
ALBUMIN/GLOB SERPL: 1 G/DL
ALP SERPL-CCNC: 41 U/L (ref 30–99)
ALT SERPL-CCNC: 31 U/L (ref 2–50)
ANION GAP SERPL CALC-SCNC: 10 MMOL/L (ref 7–16)
APPEARANCE UR: CLEAR
AST SERPL-CCNC: 35 U/L (ref 12–45)
BACTERIA #/AREA URNS HPF: NEGATIVE /HPF
BASOPHILS # BLD AUTO: 0 % (ref 0–1.8)
BASOPHILS # BLD: 0 K/UL (ref 0–0.12)
BILIRUB SERPL-MCNC: 1.1 MG/DL (ref 0.1–1.5)
BILIRUB UR QL STRIP.AUTO: NEGATIVE
BUN SERPL-MCNC: 18 MG/DL (ref 8–22)
CALCIUM SERPL-MCNC: 7.8 MG/DL (ref 8.5–10.5)
CHLORIDE SERPL-SCNC: 104 MMOL/L (ref 96–112)
CO2 SERPL-SCNC: 19 MMOL/L (ref 20–33)
COLOR UR: YELLOW
CREAT SERPL-MCNC: 1.32 MG/DL (ref 0.5–1.4)
EKG IMPRESSION: NORMAL
EOSINOPHIL # BLD AUTO: 0 K/UL (ref 0–0.51)
EOSINOPHIL NFR BLD: 0 % (ref 0–6.9)
EPI CELLS #/AREA URNS HPF: NEGATIVE /HPF
ERYTHROCYTE [DISTWIDTH] IN BLOOD BY AUTOMATED COUNT: 41.7 FL (ref 35.9–50)
FLUAV RNA SPEC QL NAA+PROBE: NEGATIVE
FLUBV RNA SPEC QL NAA+PROBE: NEGATIVE
GLOBULIN SER CALC-MCNC: 3 G/DL (ref 1.9–3.5)
GLUCOSE SERPL-MCNC: 52 MG/DL (ref 65–99)
GLUCOSE UR STRIP.AUTO-MCNC: NEGATIVE MG/DL
HCT VFR BLD AUTO: 33.8 % (ref 42–52)
HGB BLD-MCNC: 11.5 G/DL (ref 14–18)
HYALINE CASTS #/AREA URNS LPF: ABNORMAL /LPF
IMM GRANULOCYTES # BLD AUTO: 0.02 K/UL (ref 0–0.11)
IMM GRANULOCYTES NFR BLD AUTO: 0.7 % (ref 0–0.9)
KETONES UR STRIP.AUTO-MCNC: NEGATIVE MG/DL
LACTATE BLD-SCNC: 0.8 MMOL/L (ref 0.5–2)
LEUKOCYTE ESTERASE UR QL STRIP.AUTO: NEGATIVE
LYMPHOCYTES # BLD AUTO: 0.98 K/UL (ref 1–4.8)
LYMPHOCYTES NFR BLD: 33 % (ref 22–41)
MCH RBC QN AUTO: 30 PG (ref 27–33)
MCHC RBC AUTO-ENTMCNC: 34 G/DL (ref 33.7–35.3)
MCV RBC AUTO: 88.3 FL (ref 81.4–97.8)
MICRO URNS: ABNORMAL
MONOCYTES # BLD AUTO: 0.3 K/UL (ref 0–0.85)
MONOCYTES NFR BLD AUTO: 10.1 % (ref 0–13.4)
NEUTROPHILS # BLD AUTO: 1.67 K/UL (ref 1.82–7.42)
NEUTROPHILS NFR BLD: 56.2 % (ref 44–72)
NITRITE UR QL STRIP.AUTO: NEGATIVE
NRBC # BLD AUTO: 0 K/UL
NRBC BLD-RTO: 0 /100 WBC
PH UR STRIP.AUTO: 5 [PH] (ref 5–8)
PLATELET # BLD AUTO: 122 K/UL (ref 164–446)
PMV BLD AUTO: 11.5 FL (ref 9–12.9)
POTASSIUM SERPL-SCNC: 4.1 MMOL/L (ref 3.6–5.5)
PROT SERPL-MCNC: 6.1 G/DL (ref 6–8.2)
PROT UR QL STRIP: 300 MG/DL
RBC # BLD AUTO: 3.83 M/UL (ref 4.7–6.1)
RBC # URNS HPF: ABNORMAL /HPF
RBC UR QL AUTO: ABNORMAL
RSV RNA SPEC QL NAA+PROBE: NEGATIVE
SARS-COV-2 RNA RESP QL NAA+PROBE: DETECTED
SODIUM SERPL-SCNC: 133 MMOL/L (ref 135–145)
SP GR UR STRIP.AUTO: 1.01
SPECIMEN SOURCE: ABNORMAL
TROPONIN T SERPL-MCNC: 41 NG/L (ref 6–19)
UROBILINOGEN UR STRIP.AUTO-MCNC: 0.2 MG/DL
WBC # BLD AUTO: 3 K/UL (ref 4.8–10.8)
WBC #/AREA URNS HPF: ABNORMAL /HPF

## 2021-08-19 PROCEDURE — C9803 HOPD COVID-19 SPEC COLLECT: HCPCS | Performed by: EMERGENCY MEDICINE

## 2021-08-19 PROCEDURE — 0241U HCHG SARS-COV-2 COVID-19 NFCT DS RESP RNA 4 TRGT MIC: CPT

## 2021-08-19 PROCEDURE — A9270 NON-COVERED ITEM OR SERVICE: HCPCS | Performed by: EMERGENCY MEDICINE

## 2021-08-19 PROCEDURE — 96374 THER/PROPH/DIAG INJ IV PUSH: CPT

## 2021-08-19 PROCEDURE — 87077 CULTURE AEROBIC IDENTIFY: CPT

## 2021-08-19 PROCEDURE — 87186 SC STD MICRODIL/AGAR DIL: CPT | Mod: 91

## 2021-08-19 PROCEDURE — 700102 HCHG RX REV CODE 250 W/ 637 OVERRIDE(OP): Performed by: EMERGENCY MEDICINE

## 2021-08-19 PROCEDURE — 99285 EMERGENCY DEPT VISIT HI MDM: CPT

## 2021-08-19 PROCEDURE — 84484 ASSAY OF TROPONIN QUANT: CPT

## 2021-08-19 PROCEDURE — G0378 HOSPITAL OBSERVATION PER HR: HCPCS

## 2021-08-19 PROCEDURE — 700117 HCHG RX CONTRAST REV CODE 255: Performed by: EMERGENCY MEDICINE

## 2021-08-19 PROCEDURE — 87086 URINE CULTURE/COLONY COUNT: CPT

## 2021-08-19 PROCEDURE — 83605 ASSAY OF LACTIC ACID: CPT

## 2021-08-19 PROCEDURE — 87040 BLOOD CULTURE FOR BACTERIA: CPT | Mod: 91

## 2021-08-19 PROCEDURE — 87150 DNA/RNA AMPLIFIED PROBE: CPT

## 2021-08-19 PROCEDURE — 99220 PR INITIAL OBSERVATION CARE,LEVL III: CPT | Performed by: STUDENT IN AN ORGANIZED HEALTH CARE EDUCATION/TRAINING PROGRAM

## 2021-08-19 PROCEDURE — 70450 CT HEAD/BRAIN W/O DYE: CPT | Mod: MF

## 2021-08-19 PROCEDURE — 71275 CT ANGIOGRAPHY CHEST: CPT | Mod: ME

## 2021-08-19 PROCEDURE — 80053 COMPREHEN METABOLIC PANEL: CPT

## 2021-08-19 PROCEDURE — 85025 COMPLETE CBC W/AUTO DIFF WBC: CPT

## 2021-08-19 PROCEDURE — 93005 ELECTROCARDIOGRAM TRACING: CPT

## 2021-08-19 PROCEDURE — 81001 URINALYSIS AUTO W/SCOPE: CPT

## 2021-08-19 PROCEDURE — 93005 ELECTROCARDIOGRAM TRACING: CPT | Performed by: EMERGENCY MEDICINE

## 2021-08-19 PROCEDURE — 700101 HCHG RX REV CODE 250: Performed by: EMERGENCY MEDICINE

## 2021-08-19 PROCEDURE — 71045 X-RAY EXAM CHEST 1 VIEW: CPT

## 2021-08-19 RX ORDER — OMEPRAZOLE 20 MG/1
20 CAPSULE, DELAYED RELEASE ORAL EVERY MORNING
Status: DISCONTINUED | OUTPATIENT
Start: 2021-08-20 | End: 2021-08-21 | Stop reason: HOSPADM

## 2021-08-19 RX ORDER — ONDANSETRON 4 MG/1
4 TABLET, ORALLY DISINTEGRATING ORAL EVERY 4 HOURS PRN
Status: DISCONTINUED | OUTPATIENT
Start: 2021-08-19 | End: 2021-08-21 | Stop reason: HOSPADM

## 2021-08-19 RX ORDER — ALBUTEROL SULFATE 90 UG/1
2 AEROSOL, METERED RESPIRATORY (INHALATION)
Status: DISCONTINUED | OUTPATIENT
Start: 2021-08-19 | End: 2021-08-21 | Stop reason: HOSPADM

## 2021-08-19 RX ORDER — IBUPROFEN 800 MG/1
400 TABLET ORAL EVERY 6 HOURS PRN
Status: DISCONTINUED | OUTPATIENT
Start: 2021-08-19 | End: 2021-08-21 | Stop reason: HOSPADM

## 2021-08-19 RX ORDER — ENALAPRILAT 1.25 MG/ML
1.25 INJECTION INTRAVENOUS EVERY 6 HOURS PRN
Status: DISCONTINUED | OUTPATIENT
Start: 2021-08-19 | End: 2021-08-21 | Stop reason: HOSPADM

## 2021-08-19 RX ORDER — PROCHLORPERAZINE EDISYLATE 5 MG/ML
5-10 INJECTION INTRAMUSCULAR; INTRAVENOUS EVERY 4 HOURS PRN
Status: DISCONTINUED | OUTPATIENT
Start: 2021-08-19 | End: 2021-08-21 | Stop reason: HOSPADM

## 2021-08-19 RX ORDER — ONDANSETRON 2 MG/ML
4 INJECTION INTRAMUSCULAR; INTRAVENOUS EVERY 4 HOURS PRN
Status: DISCONTINUED | OUTPATIENT
Start: 2021-08-19 | End: 2021-08-21 | Stop reason: HOSPADM

## 2021-08-19 RX ORDER — BISACODYL 10 MG
10 SUPPOSITORY, RECTAL RECTAL
Status: DISCONTINUED | OUTPATIENT
Start: 2021-08-19 | End: 2021-08-21 | Stop reason: HOSPADM

## 2021-08-19 RX ORDER — GABAPENTIN 100 MG/1
100 CAPSULE ORAL
Status: DISCONTINUED | OUTPATIENT
Start: 2021-08-19 | End: 2021-08-21 | Stop reason: HOSPADM

## 2021-08-19 RX ORDER — LABETALOL HYDROCHLORIDE 5 MG/ML
10 INJECTION, SOLUTION INTRAVENOUS EVERY 4 HOURS PRN
Status: DISCONTINUED | OUTPATIENT
Start: 2021-08-19 | End: 2021-08-21 | Stop reason: HOSPADM

## 2021-08-19 RX ORDER — GABAPENTIN 100 MG/1
100 CAPSULE ORAL
COMMUNITY
End: 2021-10-05 | Stop reason: SDUPTHER

## 2021-08-19 RX ORDER — CLONIDINE HYDROCHLORIDE 0.1 MG/1
0.1 TABLET ORAL EVERY 6 HOURS PRN
Status: DISCONTINUED | OUTPATIENT
Start: 2021-08-19 | End: 2021-08-21 | Stop reason: HOSPADM

## 2021-08-19 RX ORDER — LISINOPRIL 10 MG/1
10 TABLET ORAL DAILY
Status: DISCONTINUED | OUTPATIENT
Start: 2021-08-20 | End: 2021-08-19

## 2021-08-19 RX ORDER — ATORVASTATIN CALCIUM 80 MG/1
80 TABLET, FILM COATED ORAL EVERY EVENING
Status: DISCONTINUED | OUTPATIENT
Start: 2021-08-19 | End: 2021-08-21 | Stop reason: HOSPADM

## 2021-08-19 RX ORDER — INSULIN LISPRO 100 [IU]/ML
2-9 INJECTION, SOLUTION INTRAVENOUS; SUBCUTANEOUS
Status: DISCONTINUED | OUTPATIENT
Start: 2021-08-20 | End: 2021-08-21 | Stop reason: HOSPADM

## 2021-08-19 RX ORDER — BENZONATATE 100 MG/1
100 CAPSULE ORAL 3 TIMES DAILY PRN
Status: DISCONTINUED | OUTPATIENT
Start: 2021-08-19 | End: 2021-08-21 | Stop reason: HOSPADM

## 2021-08-19 RX ORDER — AMOXICILLIN 250 MG
2 CAPSULE ORAL 2 TIMES DAILY
Status: DISCONTINUED | OUTPATIENT
Start: 2021-08-19 | End: 2021-08-21 | Stop reason: HOSPADM

## 2021-08-19 RX ORDER — CARVEDILOL 6.25 MG/1
6.25 TABLET ORAL 2 TIMES DAILY
COMMUNITY
End: 2021-09-27 | Stop reason: SDUPTHER

## 2021-08-19 RX ORDER — CARVEDILOL 6.25 MG/1
6.25 TABLET ORAL 2 TIMES DAILY
Status: DISCONTINUED | OUTPATIENT
Start: 2021-08-19 | End: 2021-08-21 | Stop reason: HOSPADM

## 2021-08-19 RX ORDER — PROMETHAZINE HYDROCHLORIDE 25 MG/1
12.5-25 SUPPOSITORY RECTAL EVERY 4 HOURS PRN
Status: DISCONTINUED | OUTPATIENT
Start: 2021-08-19 | End: 2021-08-21 | Stop reason: HOSPADM

## 2021-08-19 RX ORDER — CHOLESTYRAMINE LIGHT 4 G/5.7G
1 POWDER, FOR SUSPENSION ORAL 2 TIMES DAILY
Status: DISCONTINUED | OUTPATIENT
Start: 2021-08-19 | End: 2021-08-20

## 2021-08-19 RX ORDER — PROMETHAZINE HYDROCHLORIDE 25 MG/1
12.5-25 TABLET ORAL EVERY 4 HOURS PRN
Status: DISCONTINUED | OUTPATIENT
Start: 2021-08-19 | End: 2021-08-21 | Stop reason: HOSPADM

## 2021-08-19 RX ORDER — POLYETHYLENE GLYCOL 3350 17 G/17G
1 POWDER, FOR SOLUTION ORAL
Status: DISCONTINUED | OUTPATIENT
Start: 2021-08-19 | End: 2021-08-21 | Stop reason: HOSPADM

## 2021-08-19 RX ORDER — ACETAMINOPHEN 325 MG/1
650 TABLET ORAL EVERY 6 HOURS PRN
Status: DISCONTINUED | OUTPATIENT
Start: 2021-08-19 | End: 2021-08-21 | Stop reason: HOSPADM

## 2021-08-19 RX ORDER — DEXTROSE MONOHYDRATE 25 G/50ML
50 INJECTION, SOLUTION INTRAVENOUS ONCE
Status: COMPLETED | OUTPATIENT
Start: 2021-08-19 | End: 2021-08-19

## 2021-08-19 RX ORDER — DEXTROSE MONOHYDRATE 25 G/50ML
50 INJECTION, SOLUTION INTRAVENOUS
Status: DISCONTINUED | OUTPATIENT
Start: 2021-08-19 | End: 2021-08-21 | Stop reason: HOSPADM

## 2021-08-19 RX ORDER — ACETAMINOPHEN 325 MG/1
650 TABLET ORAL ONCE
Status: COMPLETED | OUTPATIENT
Start: 2021-08-19 | End: 2021-08-19

## 2021-08-19 RX ORDER — IBUPROFEN 200 MG
400 TABLET ORAL EVERY 6 HOURS PRN
COMMUNITY
End: 2021-09-30

## 2021-08-19 RX ORDER — INSULIN GLARGINE 100 [IU]/ML
10-20 INJECTION, SOLUTION SUBCUTANEOUS EVERY EVENING
COMMUNITY
End: 2021-10-28 | Stop reason: DRUGHIGH

## 2021-08-19 RX ADMIN — ACETAMINOPHEN 650 MG: 325 TABLET, FILM COATED ORAL at 18:37

## 2021-08-19 RX ADMIN — IOHEXOL 50 ML: 350 INJECTION, SOLUTION INTRAVENOUS at 19:52

## 2021-08-19 RX ADMIN — DEXTROSE MONOHYDRATE 50 ML: 25 INJECTION, SOLUTION INTRAVENOUS at 21:30

## 2021-08-19 ASSESSMENT — ENCOUNTER SYMPTOMS
ABDOMINAL PAIN: 0
WHEEZING: 0
MYALGIAS: 0
LOSS OF CONSCIOUSNESS: 1
VOMITING: 0
ORTHOPNEA: 0
NAUSEA: 0
DIARRHEA: 0
CHILLS: 0
CONSTIPATION: 0
BLURRED VISION: 0
WEIGHT LOSS: 0
PALPITATIONS: 0
HEMOPTYSIS: 0
BLOOD IN STOOL: 0
COUGH: 1
SHORTNESS OF BREATH: 1
FEVER: 1
WEAKNESS: 0
SORE THROAT: 0
EYE PAIN: 0
DIZZINESS: 0

## 2021-08-19 ASSESSMENT — PAIN DESCRIPTION - PAIN TYPE
TYPE: ACUTE PAIN

## 2021-08-19 ASSESSMENT — FIBROSIS 4 INDEX
FIB4 SCORE: 0.75
FIB4 SCORE: 2.68

## 2021-08-20 ENCOUNTER — PATIENT OUTREACH (OUTPATIENT)
Dept: HEALTH INFORMATION MANAGEMENT | Facility: OTHER | Age: 51
End: 2021-08-20

## 2021-08-20 ENCOUNTER — APPOINTMENT (OUTPATIENT)
Dept: CARDIOLOGY | Facility: MEDICAL CENTER | Age: 51
End: 2021-08-20
Attending: STUDENT IN AN ORGANIZED HEALTH CARE EDUCATION/TRAINING PROGRAM
Payer: MEDICARE

## 2021-08-20 PROBLEM — E66.09 CLASS 1 OBESITY DUE TO EXCESS CALORIES WITH SERIOUS COMORBIDITY AND BODY MASS INDEX (BMI) OF 30.0 TO 30.9 IN ADULT: Status: ACTIVE | Noted: 2021-08-20

## 2021-08-20 PROBLEM — E66.811 CLASS 1 OBESITY DUE TO EXCESS CALORIES WITH SERIOUS COMORBIDITY AND BODY MASS INDEX (BMI) OF 30.0 TO 30.9 IN ADULT: Status: ACTIVE | Noted: 2021-08-20

## 2021-08-20 LAB
ANION GAP SERPL CALC-SCNC: 11 MMOL/L (ref 7–16)
ANION GAP SERPL CALC-SCNC: 11 MMOL/L (ref 7–16)
BASOPHILS # BLD AUTO: 0 % (ref 0–1.8)
BASOPHILS # BLD AUTO: 0.3 % (ref 0–1.8)
BASOPHILS # BLD: 0 K/UL (ref 0–0.12)
BASOPHILS # BLD: 0.01 K/UL (ref 0–0.12)
BUN SERPL-MCNC: 15 MG/DL (ref 8–22)
BUN SERPL-MCNC: 16 MG/DL (ref 8–22)
BURR CELLS BLD QL SMEAR: NORMAL
CALCIUM SERPL-MCNC: 7.6 MG/DL (ref 8.5–10.5)
CALCIUM SERPL-MCNC: 7.7 MG/DL (ref 8.5–10.5)
CHLORIDE SERPL-SCNC: 102 MMOL/L (ref 96–112)
CHLORIDE SERPL-SCNC: 102 MMOL/L (ref 96–112)
CO2 SERPL-SCNC: 19 MMOL/L (ref 20–33)
CO2 SERPL-SCNC: 19 MMOL/L (ref 20–33)
CREAT SERPL-MCNC: 1.26 MG/DL (ref 0.5–1.4)
CREAT SERPL-MCNC: 1.27 MG/DL (ref 0.5–1.4)
EOSINOPHIL # BLD AUTO: 0 K/UL (ref 0–0.51)
EOSINOPHIL # BLD AUTO: 0 K/UL (ref 0–0.51)
EOSINOPHIL NFR BLD: 0 % (ref 0–6.9)
EOSINOPHIL NFR BLD: 0 % (ref 0–6.9)
ERYTHROCYTE [DISTWIDTH] IN BLOOD BY AUTOMATED COUNT: 40.4 FL (ref 35.9–50)
ERYTHROCYTE [DISTWIDTH] IN BLOOD BY AUTOMATED COUNT: 40.9 FL (ref 35.9–50)
GLUCOSE BLD-MCNC: 156 MG/DL (ref 65–99)
GLUCOSE BLD-MCNC: 63 MG/DL (ref 65–99)
GLUCOSE BLD-MCNC: 80 MG/DL (ref 65–99)
GLUCOSE BLD-MCNC: 83 MG/DL (ref 65–99)
GLUCOSE BLD-MCNC: 83 MG/DL (ref 65–99)
GLUCOSE BLD-MCNC: 91 MG/DL (ref 65–99)
GLUCOSE BLD-MCNC: 92 MG/DL (ref 65–99)
GLUCOSE BLD-MCNC: 98 MG/DL (ref 65–99)
GLUCOSE SERPL-MCNC: 72 MG/DL (ref 65–99)
GLUCOSE SERPL-MCNC: 73 MG/DL (ref 65–99)
HCT VFR BLD AUTO: 33 % (ref 42–52)
HCT VFR BLD AUTO: 33.2 % (ref 42–52)
HGB BLD-MCNC: 11.5 G/DL (ref 14–18)
HGB BLD-MCNC: 11.6 G/DL (ref 14–18)
IMM GRANULOCYTES # BLD AUTO: 0.02 K/UL (ref 0–0.11)
IMM GRANULOCYTES NFR BLD AUTO: 0.6 % (ref 0–0.9)
LACTATE BLD-SCNC: 1.4 MMOL/L (ref 0.5–2)
LV EJECT FRACT  99904: 65
LV EJECT FRACT MOD 2C 99903: 60.19
LV EJECT FRACT MOD 4C 99902: 57.72
LV EJECT FRACT MOD BP 99901: 57.12
LYMPHOCYTES # BLD AUTO: 0.52 K/UL (ref 1–4.8)
LYMPHOCYTES # BLD AUTO: 1.11 K/UL (ref 1–4.8)
LYMPHOCYTES NFR BLD: 14 % (ref 22–41)
LYMPHOCYTES NFR BLD: 31.8 % (ref 22–41)
MAGNESIUM SERPL-MCNC: 1.5 MG/DL (ref 1.5–2.5)
MAGNESIUM SERPL-MCNC: 1.5 MG/DL (ref 1.5–2.5)
MANUAL DIFF BLD: NORMAL
MCH RBC QN AUTO: 30.1 PG (ref 27–33)
MCH RBC QN AUTO: 30.4 PG (ref 27–33)
MCHC RBC AUTO-ENTMCNC: 34.8 G/DL (ref 33.7–35.3)
MCHC RBC AUTO-ENTMCNC: 34.9 G/DL (ref 33.7–35.3)
MCV RBC AUTO: 86.4 FL (ref 81.4–97.8)
MCV RBC AUTO: 87.1 FL (ref 81.4–97.8)
MONOCYTES # BLD AUTO: 0.33 K/UL (ref 0–0.85)
MONOCYTES # BLD AUTO: 0.4 K/UL (ref 0–0.85)
MONOCYTES NFR BLD AUTO: 11.5 % (ref 0–13.4)
MONOCYTES NFR BLD AUTO: 8.8 % (ref 0–13.4)
MORPHOLOGY BLD-IMP: NORMAL
MYELOCYTES NFR BLD MANUAL: 0.9 %
NEUTROPHILS # BLD AUTO: 1.95 K/UL (ref 1.82–7.42)
NEUTROPHILS # BLD AUTO: 2.79 K/UL (ref 1.82–7.42)
NEUTROPHILS NFR BLD: 55.8 % (ref 44–72)
NEUTROPHILS NFR BLD: 71 % (ref 44–72)
NEUTS BAND NFR BLD MANUAL: 4.4 % (ref 0–10)
NRBC # BLD AUTO: 0 K/UL
NRBC # BLD AUTO: 0 K/UL
NRBC BLD-RTO: 0 /100 WBC
NRBC BLD-RTO: 0 /100 WBC
PLATELET # BLD AUTO: 117 K/UL (ref 164–446)
PLATELET # BLD AUTO: 128 K/UL (ref 164–446)
PLATELET BLD QL SMEAR: NORMAL
PMV BLD AUTO: 11.6 FL (ref 9–12.9)
PMV BLD AUTO: 11.9 FL (ref 9–12.9)
POIKILOCYTOSIS BLD QL SMEAR: NORMAL
POTASSIUM SERPL-SCNC: 4.1 MMOL/L (ref 3.6–5.5)
POTASSIUM SERPL-SCNC: 4.4 MMOL/L (ref 3.6–5.5)
PROCALCITONIN SERPL-MCNC: 0.08 NG/ML
PROMYELOCYTES NFR BLD MANUAL: 0.9 %
RBC # BLD AUTO: 3.81 M/UL (ref 4.7–6.1)
RBC # BLD AUTO: 3.82 M/UL (ref 4.7–6.1)
RBC BLD AUTO: PRESENT
SODIUM SERPL-SCNC: 132 MMOL/L (ref 135–145)
SODIUM SERPL-SCNC: 132 MMOL/L (ref 135–145)
TROPONIN T SERPL-MCNC: 42 NG/L (ref 6–19)
WBC # BLD AUTO: 3.5 K/UL (ref 4.8–10.8)
WBC # BLD AUTO: 3.7 K/UL (ref 4.8–10.8)

## 2021-08-20 PROCEDURE — A9270 NON-COVERED ITEM OR SERVICE: HCPCS | Performed by: STUDENT IN AN ORGANIZED HEALTH CARE EDUCATION/TRAINING PROGRAM

## 2021-08-20 PROCEDURE — 94640 AIRWAY INHALATION TREATMENT: CPT

## 2021-08-20 PROCEDURE — 99225 PR SUBSEQUENT OBSERVATION CARE,LEVEL II: CPT | Performed by: STUDENT IN AN ORGANIZED HEALTH CARE EDUCATION/TRAINING PROGRAM

## 2021-08-20 PROCEDURE — 700102 HCHG RX REV CODE 250 W/ 637 OVERRIDE(OP): Performed by: STUDENT IN AN ORGANIZED HEALTH CARE EDUCATION/TRAINING PROGRAM

## 2021-08-20 PROCEDURE — 36415 COLL VENOUS BLD VENIPUNCTURE: CPT

## 2021-08-20 PROCEDURE — 700111 HCHG RX REV CODE 636 W/ 250 OVERRIDE (IP): Performed by: STUDENT IN AN ORGANIZED HEALTH CARE EDUCATION/TRAINING PROGRAM

## 2021-08-20 PROCEDURE — 93325 DOPPLER ECHO COLOR FLOW MAPG: CPT

## 2021-08-20 PROCEDURE — 85025 COMPLETE CBC W/AUTO DIFF WBC: CPT

## 2021-08-20 PROCEDURE — 85027 COMPLETE CBC AUTOMATED: CPT

## 2021-08-20 PROCEDURE — G0378 HOSPITAL OBSERVATION PER HR: HCPCS

## 2021-08-20 PROCEDURE — 82962 GLUCOSE BLOOD TEST: CPT | Mod: 91

## 2021-08-20 PROCEDURE — 700101 HCHG RX REV CODE 250: Performed by: STUDENT IN AN ORGANIZED HEALTH CARE EDUCATION/TRAINING PROGRAM

## 2021-08-20 PROCEDURE — 94760 N-INVAS EAR/PLS OXIMETRY 1: CPT

## 2021-08-20 PROCEDURE — 96366 THER/PROPH/DIAG IV INF ADDON: CPT

## 2021-08-20 PROCEDURE — 93308 TTE F-UP OR LMTD: CPT | Mod: 26 | Performed by: INTERNAL MEDICINE

## 2021-08-20 PROCEDURE — 87040 BLOOD CULTURE FOR BACTERIA: CPT

## 2021-08-20 PROCEDURE — 83735 ASSAY OF MAGNESIUM: CPT

## 2021-08-20 PROCEDURE — 96367 TX/PROPH/DG ADDL SEQ IV INF: CPT

## 2021-08-20 PROCEDURE — 93321 DOPPLER ECHO F-UP/LMTD STD: CPT | Mod: 26 | Performed by: INTERNAL MEDICINE

## 2021-08-20 PROCEDURE — 96365 THER/PROPH/DIAG IV INF INIT: CPT

## 2021-08-20 PROCEDURE — 96372 THER/PROPH/DIAG INJ SC/IM: CPT

## 2021-08-20 PROCEDURE — 84145 PROCALCITONIN (PCT): CPT

## 2021-08-20 PROCEDURE — 85007 BL SMEAR W/DIFF WBC COUNT: CPT

## 2021-08-20 PROCEDURE — 93325 DOPPLER ECHO COLOR FLOW MAPG: CPT | Mod: 26 | Performed by: INTERNAL MEDICINE

## 2021-08-20 PROCEDURE — 83605 ASSAY OF LACTIC ACID: CPT

## 2021-08-20 PROCEDURE — 84484 ASSAY OF TROPONIN QUANT: CPT

## 2021-08-20 PROCEDURE — 80048 BASIC METABOLIC PNL TOTAL CA: CPT | Mod: 91

## 2021-08-20 RX ORDER — DEXAMETHASONE 0.5 MG/1
6 TABLET ORAL DAILY
Qty: 120 TABLET | Refills: 0 | Status: SHIPPED | OUTPATIENT
Start: 2021-08-20 | End: 2021-08-30

## 2021-08-20 RX ORDER — SODIUM CHLORIDE, SODIUM LACTATE, POTASSIUM CHLORIDE, AND CALCIUM CHLORIDE .6; .31; .03; .02 G/100ML; G/100ML; G/100ML; G/100ML
30 INJECTION, SOLUTION INTRAVENOUS
Status: DISCONTINUED | OUTPATIENT
Start: 2021-08-20 | End: 2021-08-21 | Stop reason: HOSPADM

## 2021-08-20 RX ORDER — SODIUM CHLORIDE, SODIUM LACTATE, POTASSIUM CHLORIDE, AND CALCIUM CHLORIDE .6; .31; .03; .02 G/100ML; G/100ML; G/100ML; G/100ML
500 INJECTION, SOLUTION INTRAVENOUS
Status: DISCONTINUED | OUTPATIENT
Start: 2021-08-20 | End: 2021-08-21 | Stop reason: HOSPADM

## 2021-08-20 RX ORDER — CEFAZOLIN SODIUM 2 G/100ML
2 INJECTION, SOLUTION INTRAVENOUS EVERY 8 HOURS
Status: DISCONTINUED | OUTPATIENT
Start: 2021-08-20 | End: 2021-08-21

## 2021-08-20 RX ORDER — FLUTICASONE PROPIONATE 50 MCG
2 SPRAY, SUSPENSION (ML) NASAL DAILY
Status: DISCONTINUED | OUTPATIENT
Start: 2021-08-21 | End: 2021-08-20

## 2021-08-20 RX ORDER — CHOLECALCIFEROL (VITAMIN D3) 125 MCG
5 CAPSULE ORAL NIGHTLY PRN
Status: DISCONTINUED | OUTPATIENT
Start: 2021-08-20 | End: 2021-08-21 | Stop reason: HOSPADM

## 2021-08-20 RX ORDER — CHOLESTYRAMINE LIGHT 4 G/5.7G
1 POWDER, FOR SUSPENSION ORAL 2 TIMES DAILY
Status: DISCONTINUED | OUTPATIENT
Start: 2021-08-20 | End: 2021-08-21 | Stop reason: HOSPADM

## 2021-08-20 RX ORDER — FLUTICASONE PROPIONATE 50 MCG
2 SPRAY, SUSPENSION (ML) NASAL DAILY
Status: DISCONTINUED | OUTPATIENT
Start: 2021-08-20 | End: 2021-08-21 | Stop reason: HOSPADM

## 2021-08-20 RX ORDER — BENZONATATE 100 MG/1
100 CAPSULE ORAL 3 TIMES DAILY PRN
Qty: 21 CAPSULE | Refills: 0 | Status: SHIPPED | OUTPATIENT
Start: 2021-08-20 | End: 2021-09-13

## 2021-08-20 RX ORDER — MAGNESIUM SULFATE HEPTAHYDRATE 40 MG/ML
4 INJECTION, SOLUTION INTRAVENOUS ONCE
Status: COMPLETED | OUTPATIENT
Start: 2021-08-20 | End: 2021-08-21

## 2021-08-20 RX ADMIN — ALBUTEROL SULFATE 2 PUFF: 90 AEROSOL, METERED RESPIRATORY (INHALATION) at 04:06

## 2021-08-20 RX ADMIN — ALBUTEROL SULFATE 2 PUFF: 90 AEROSOL, METERED RESPIRATORY (INHALATION) at 00:33

## 2021-08-20 RX ADMIN — DEXTROSE MONOHYDRATE 50 ML: 25 INJECTION, SOLUTION INTRAVENOUS at 06:28

## 2021-08-20 RX ADMIN — CARVEDILOL 6.25 MG: 6.25 TABLET, FILM COATED ORAL at 18:06

## 2021-08-20 RX ADMIN — BENZONATATE 100 MG: 100 CAPSULE ORAL at 01:44

## 2021-08-20 RX ADMIN — CARVEDILOL 6.25 MG: 6.25 TABLET, FILM COATED ORAL at 06:14

## 2021-08-20 RX ADMIN — GABAPENTIN 100 MG: 100 CAPSULE ORAL at 00:33

## 2021-08-20 RX ADMIN — CHOLESTYRAMINE 4 G: 4 POWDER, FOR SUSPENSION ORAL at 18:06

## 2021-08-20 RX ADMIN — CARVEDILOL 6.25 MG: 6.25 TABLET, FILM COATED ORAL at 00:33

## 2021-08-20 RX ADMIN — MAGNESIUM SULFATE IN WATER 4 G: 40 INJECTION, SOLUTION INTRAVENOUS at 21:30

## 2021-08-20 RX ADMIN — ENOXAPARIN SODIUM 40 MG: 40 INJECTION SUBCUTANEOUS at 06:14

## 2021-08-20 RX ADMIN — ALBUTEROL SULFATE 2 PUFF: 90 AEROSOL, METERED RESPIRATORY (INHALATION) at 22:19

## 2021-08-20 RX ADMIN — ACETAMINOPHEN 650 MG: 325 TABLET, FILM COATED ORAL at 04:04

## 2021-08-20 RX ADMIN — CEFAZOLIN SODIUM 2 G: 2 INJECTION, SOLUTION INTRAVENOUS at 22:04

## 2021-08-20 RX ADMIN — FLUTICASONE PROPIONATE 100 MCG: 50 SPRAY, METERED NASAL at 21:47

## 2021-08-20 RX ADMIN — CHOLESTYRAMINE 4 G: 4 POWDER, FOR SUSPENSION ORAL at 09:39

## 2021-08-20 RX ADMIN — ASPIRIN 81 MG: 81 TABLET, COATED ORAL at 06:13

## 2021-08-20 RX ADMIN — OMEPRAZOLE 20 MG: 20 CAPSULE, DELAYED RELEASE ORAL at 06:13

## 2021-08-20 RX ADMIN — ALBUTEROL SULFATE 2 PUFF: 90 AEROSOL, METERED RESPIRATORY (INHALATION) at 07:00

## 2021-08-20 RX ADMIN — ATORVASTATIN CALCIUM 80 MG: 80 TABLET, FILM COATED ORAL at 18:06

## 2021-08-20 RX ADMIN — ALBUTEROL SULFATE 2 PUFF: 90 AEROSOL, METERED RESPIRATORY (INHALATION) at 10:37

## 2021-08-20 RX ADMIN — GABAPENTIN 100 MG: 100 CAPSULE ORAL at 20:26

## 2021-08-20 RX ADMIN — Medication 5 MG: at 22:10

## 2021-08-20 RX ADMIN — ATORVASTATIN CALCIUM 80 MG: 80 TABLET, FILM COATED ORAL at 00:33

## 2021-08-20 RX ADMIN — ALBUTEROL SULFATE 2 PUFF: 90 AEROSOL, METERED RESPIRATORY (INHALATION) at 14:44

## 2021-08-20 ASSESSMENT — PATIENT HEALTH QUESTIONNAIRE - PHQ9
SUM OF ALL RESPONSES TO PHQ9 QUESTIONS 1 AND 2: 0
1. LITTLE INTEREST OR PLEASURE IN DOING THINGS: NOT AT ALL
2. FEELING DOWN, DEPRESSED, IRRITABLE, OR HOPELESS: NOT AT ALL

## 2021-08-20 ASSESSMENT — COGNITIVE AND FUNCTIONAL STATUS - GENERAL
MOVING TO AND FROM BED TO CHAIR: UNABLE
TURNING FROM BACK TO SIDE WHILE IN FLAT BAD: UNABLE
STANDING UP FROM CHAIR USING ARMS: A LITTLE
SUGGESTED CMS G CODE MODIFIER DAILY ACTIVITY: CH
DAILY ACTIVITIY SCORE: 24
MOBILITY SCORE: 12
WALKING IN HOSPITAL ROOM: A LITTLE
MOVING FROM LYING ON BACK TO SITTING ON SIDE OF FLAT BED: UNABLE
SUGGESTED CMS G CODE MODIFIER MOBILITY: CL
CLIMB 3 TO 5 STEPS WITH RAILING: A LITTLE

## 2021-08-20 ASSESSMENT — COPD QUESTIONNAIRES
HAVE YOU SMOKED AT LEAST 100 CIGARETTES IN YOUR ENTIRE LIFE: NO/DON'T KNOW
COPD SCREENING SCORE: 1
DO YOU EVER COUGH UP ANY MUCUS OR PHLEGM?: NO/ONLY WITH OCCASIONAL COLDS OR INFECTIONS
DURING THE PAST 4 WEEKS HOW MUCH DID YOU FEEL SHORT OF BREATH: NONE/LITTLE OF THE TIME

## 2021-08-20 ASSESSMENT — LIFESTYLE VARIABLES
EVER HAD A DRINK FIRST THING IN THE MORNING TO STEADY YOUR NERVES TO GET RID OF A HANGOVER: NO
TOTAL SCORE: 0
ALCOHOL_USE: NO
HAVE YOU EVER FELT YOU SHOULD CUT DOWN ON YOUR DRINKING: NO
HAVE PEOPLE ANNOYED YOU BY CRITICIZING YOUR DRINKING: NO
CONSUMPTION TOTAL: NEGATIVE
HOW MANY TIMES IN THE PAST YEAR HAVE YOU HAD 5 OR MORE DRINKS IN A DAY: 0
TOTAL SCORE: 0
EVER FELT BAD OR GUILTY ABOUT YOUR DRINKING: NO
AVERAGE NUMBER OF DAYS PER WEEK YOU HAVE A DRINK CONTAINING ALCOHOL: 0
DOES PATIENT WANT TO STOP DRINKING: NO
ON A TYPICAL DAY WHEN YOU DRINK ALCOHOL HOW MANY DRINKS DO YOU HAVE: 0
TOTAL SCORE: 0

## 2021-08-20 NOTE — ASSESSMENT & PLAN NOTE
Noted to have into his hypoglycemia past few days  glucose at home was 67  glucose 52 in ED  s/p D50 x1  -holding home glimepiride 4 mg and Lantus 10 units nightly.  -Patient also noted to be on Trulicity which he gets q. weekly  -we'll initiate ISS  -serial point-of-care glucose testing  -hypoglycemia protocol, will consider glucose infusion if hypoglycemia persists

## 2021-08-20 NOTE — ED TRIAGE NOTES
"Chief Complaint   Patient presents with   • Syncope     BIB EMS from home. pt is covid positive. past 2-4 days pt has been passing out every time he stand up. today at 0930 pt passed out and hit head. negative LOC, pt not on blood thinners.    • Fever   • Chills   • Weakness     generalized       BP (!) 169/79   Pulse 97   Temp (!) 38.2 °C (100.8 °F) (Temporal)   Resp 18   Ht 1.854 m (6' 1\")   Wt 99.8 kg (220 lb)   SpO2 94%   BMI 29.03 kg/m²     Everett vazquez responded to call. Pt has hx of DM II. His BS for fire was 50, they have him 15 g glucose. BS for EMS was 89.   Pt received 900 mL NS from EMS  "

## 2021-08-20 NOTE — DISCHARGE INSTRUCTIONS
Discharge Instructions    Discharged to home by car with self. Discharged via wheelchair, hospital escort: Yes.  Special equipment needed: Not Applicable    Be sure to schedule a follow-up appointment with your primary care doctor or any specialists as instructed.     Discharge Plan:        I understand that a diet low in cholesterol, fat, and sodium is recommended for good health. Unless I have been given specific instructions below for another diet, I accept this instruction as my diet prescription.   Other diet: diabetic    Special Instructions: None    · Is patient discharged on Warfarin / Coumadin?   No     Depression / Suicide Risk    As you are discharged from this [facility] facility, it is important to learn how to keep safe from harming yourself.    Recognize the warning signs:  · Abrupt changes in personality, positive or negative- including increase in energy   · Giving away possessions  · Change in eating patterns- significant weight changes-  positive or negative  · Change in sleeping patterns- unable to sleep or sleeping all the time   · Unwillingness or inability to communicate  · Depression  · Unusual sadness, discouragement and loneliness  · Talk of wanting to die  · Neglect of personal appearance   · Rebelliousness- reckless behavior  · Withdrawal from people/activities they love  · Confusion- inability to concentrate     If you or a loved one observes any of these behaviors or has concerns about self-harm, here's what you can do:  · Talk about it- your feelings and reasons for harming yourself  · Remove any means that you might use to hurt yourself (examples: pills, rope, extension cords, firearm)  · Get professional help from the community (Mental Health, Substance Abuse, psychological counseling)  · Do not be alone:Call your Safe Contact- someone whom you trust who will be there for you.  · Call your local CRISIS HOTLINE 707-9796 or 520-956-5099  · Call your local Children's Mobile Crisis  Response Team [facility] (696) 134-3282 or www.[employer]  · Call the toll free National Suicide Prevention Hotlines   · National Suicide Prevention Lifeline 176-083-HCNG (6260)  · National Hope Line Network 800-SUICIDE (478-4116)        INSTRUCTIONS FOR COVID-19 OR ANY OTHER INFECTIOUS RESPIRATORY ILLNESSES    The Centers for Disease Control and Prevention (CDC) states that early indications for COVID-19 include cough, shortness of breath, difficulty breathing, or at least two of the following symptoms: chills, shaking with chills, muscle pain, headache, sore throat, and loss of taste or smell. Symptoms can range from mild to severe and may appear up to two weeks after exposure to the virus.    The practice of self-isolation and quarantine helps protect the public and your family by  preventing exposure to people who have or may have a contagious disease. Please follow the prevention steps below as based on CDC guidelines:    WHEN TO STOP ISOLATION: Persons with COVID-19 or any other infectious respiratory illness who have symptoms and were advised to care for themselves at home may discontinue home isolation under the following conditions:  · At least 24 hours have passed since recovery defined as resolution of fever without the use of fever-reducing medications; AND,  · Improvement in respiratory symptoms (e.g., cough, shortness of breath); AND,  · At least 10 days have passed since symptoms first appeared and have had no subsequent illness.    MONITOR YOUR SYMPTOMS: If your illness is worsening, seek prompt medical attention. If you have a medical emergency and need to call 911, notify the dispatch personnel that you have, or are being evaluated for confirmed or suspected COVID-19 or another infectious respiratory illness. Wear a facemask if possible.    ACTIVITY RESTRICTION: restrict activities outside your home, except for getting medical care. Do not go to work, school, or public areas. Avoid using  public transportation, ride-sharing, or taxis.    SCHEDULED MEDICAL APPOINTMENTS: Notify your provider that you have, or are being evaluated for, confirmed or suspected COVID-19 or another infectious respiratory. This will help the healthcare provider’s office safely take care of you and keep other people from getting exposed or infected.    FACEMASKS, when to wear: Anytime you are away from your home or around other people or pets. If you are unable to wear one, maintain a minimum of 6 feet distancing from others.    LIVING ENVIRONMENT: Stay in a separate room from other people and pets. If possible, use a separate bathroom, have someone else care for your pets and avoid sharing household items. Any items used should be washed thoroughly with soap and water. Clean all “high-touch” surfaces every day. Use a household cleaning spray or wipe, according to the label instructions. High touch surfaces include (but are not limited to) counters, tabletops, doorknobs, bathroom fixtures, toilets, phones, keyboards, tablets, and bedside tables.     HAND WASHING: Frequently wash hands with soap and water for at least 20 seconds,  especially after blowing your nose, coughing, or sneezing; going to the bathroom; before and after interacting with pets; and before and after eating or preparing food. If hands are visibly dirty use soap and water. If soap and water are not available, use an alcohol-based hand  with at least 60% alcohol. Avoid touching your eyes, nose, and mouth with unwashed hands. Cover your coughs and sneezes with a tissue. Throw used tissues in a lined trash can. Immediately wash your hands.    ACTIVE/FACILITATED SELF-MONITORING: Follow instructions provided by your local health department or health professionals, as appropriate. When working with your local health department check their available hours.    [facility]   Phone Number   [place] [phone]   [facility] [place] [phone]    [facility] [address]   [place] [phone]     IF YOU HAVE CONFIRMED POSITIVE COVID-19:    Those who have completely recovered from COVID-19 may have immune-boosting antibodies in their plasma--called “convalescent plasma”--that could be used to treat critically ill COVID19 patients.    [facility] is excited to begin working with [facility] on collecting convalescent plasma from  people who have recovered from COVID-19 as part of a program to treat patients infected with the virus. This FDA-approved “emergency investigational new drug” is a special blood product containing antibodies that may give patients an extra boost to fight the virus.    To be eligible to donate convalescent plasma, you must have a prior COVID-19 diagnosis documented by a laboratory test (or a positive test result for SARS-CoV-2 antibodies) and meet additional eligibility requirements.    If you are interested in donating convalescent plasma or have any additional questions, please contact the [facility] Convalescent Plasma  at [phone] or via e-mail at [email or web address].      Sepsis, Diagnosis, Adult  Sepsis is a serious bodily reaction to an infection. The infection that triggers sepsis may be from a bacteria, virus, or fungus. Sepsis can result from an infection in any part of your body. Infections that commonly lead to sepsis include skin, lung, and urinary tract infections.  Sepsis is a medical emergency that must be treated right away in a hospital. In severe cases, it can lead to septic shock. Septic shock can weaken your heart and cause your blood pressure to drop. This can cause your central nervous system and your body's organs to stop working.  What are the causes?  This condition is caused by a severe reaction to infections from bacteria, viruses, or fungus. The germs that most often lead to sepsis include:  · Escherichia coli (E. coli) bacteria.  · Staphylococcus aureus (staph) bacteria.  · Some  types of Streptococcus bacteria.  The most common infections affect these organs:  · The lung (pneumonia).  · The kidneys or bladder (urinary tract infection).  · The skin (cellulitis).  · The bowel, gallbladder, or pancreas.  What increases the risk?  You are more likely to develop this condition if:  · Your body's disease-fighting system (immune system) is weakened.  · You are age 65 or older.  · You are male.  · You had surgery or you have been hospitalized.  · You have these devices inserted into your body:  ? A small, thin tube (catheter).  ? IV line.  ? Breathing tube.  ? Drainage tube.  · You are not getting enough nutrients from food (malnourished).  · You have a long-term (chronic) disease, such as cancer, lung disease, kidney disease, or diabetes.  · You are .  What are the signs or symptoms?  Symptoms of this condition may include:  · Fever.  · Chills or feeling very cold.  · Confusion or anxiety.  · Fatigue.  · Muscle aches.  · Shortness of breath.  · Nausea and vomiting.  · Urinating much less than usual.  · Fast heart rate (tachycardia).  · Rapid breathing (hyperventilation).  · Changes in skin color. Your skin may look blotchy, pale, or blue.  · Cool, clammy, or sweaty skin.  · Skin rash.  Other symptoms depend on the source of your infection.  How is this diagnosed?  This condition is diagnosed based on:  · Your symptoms.  · Your medical history.  · A physical exam.  Other tests may also be done to find out the cause of the infection and how severe the sepsis is. These tests may include:  · Blood tests.  · Urine tests.  · Swabs from other areas of your body that may have an infection. These samples may be tested (cultured) to find out what type of bacteria is causing the infection.  · Chest X-ray to check for pneumonia. Other imaging tests, such as a CT scan, may also be done.  · Lumbar puncture. This removes a small amount of the fluid that surrounds your brain and spinal cord. The  fluid is then examined for infection.  How is this treated?  This condition must be treated in a hospital. Based on the cause of your infection, you may be given an antibiotic, antiviral, or antifungal medicine.  You may also receive:  · Fluids through an IV.  · Oxygen and breathing assistance.  · Medicines to increase your blood pressure.  · Kidney dialysis. This process cleans your blood if your kidneys have failed.  · Surgery to remove infected tissue.  · Blood transfusion if needed.  · Medicine to prevent blood clots.  · Nutrients to correct imbalances in basic body function (metabolism). You may:  ? Receive important salts and minerals (electrolytes) through an IV.  ? Have your blood sugar level adjusted.  Follow these instructions at home:  Medicines    · Take over-the-counter and prescription medicines only as told by your health care provider.  · If you were prescribed an antibiotic, antiviral, or antifungal medicine, take it as told by your health care provider. Do not stop taking the medicine even if you start to feel better.  General instructions  · If you have a catheter or other indwelling device, ask to have it removed as soon as possible.  · Keep all follow-up visits as told by your health care provider. This is important.  Contact a health care provider if:  · You do not feel like you are getting better or regaining strength.  · You are having trouble coping with your recovery.  · You frequently feel tired.  · You feel worse or do not seem to get better after surgery.  · You think you may have an infection after surgery.  Get help right away if:  · You have any symptoms of sepsis.  · You have difficulty breathing.  · You have a rapid or skipping heartbeat.  · You become confused or disoriented.  · You have a high fever.  · Your skin becomes blotchy, pale, or blue.  · You have an infection that is getting worse or not getting better.  These symptoms may represent a serious problem that is an  emergency. Do not wait to see if the symptoms will go away. Get medical help right away. Call your local emergency services (911 in the [place] Do not drive yourself to the hospital.  Summary  · Sepsis is a medical emergency that requires immediate treatment in a hospital.  · This condition is caused by a severe reaction to infections from bacteria, viruses, or fungus.  · Based on the cause of your infection, you may be given an antibiotic, antiviral, or antifungal medicine.  · Treatment may also include IV fluids, breathing assistance, and kidney dialysis.  This information is not intended to replace advice given to you by your health care provider. Make sure you discuss any questions you have with your health care provider.  Document Released: 09/15/2004 Document Revised: 07/26/2019 Document Reviewed: 07/26/2019  [employer] Patient Education © 2020 [employer]    Hypoglycemia  Hypoglycemia occurs when the level of sugar (glucose) in the blood is too low. Hypoglycemia can happen in people who do or do not have diabetes. It can develop quickly, and it can be a medical emergency. For most people with diabetes, a blood glucose level below 70 mg/dL (3.9 mmol/L) is considered hypoglycemia.  Glucose is a type of sugar that provides the body's main source of energy. Certain hormones (insulin and glucagon) control the level of glucose in the blood. Insulin lowers blood glucose, and glucagon raises blood glucose. Hypoglycemia can result from having too much insulin in the bloodstream, or from not eating enough food that contains glucose. You may also have reactive hypoglycemia, which happens within 4 hours after eating a meal.  What are the causes?  Hypoglycemia occurs most often in people who have diabetes and may be caused by:  · Diabetes medicine.  · Not eating enough, or not eating often enough.  · Increased physical activity.  · Drinking alcohol on an empty stomach.  If you do not have diabetes, hypoglycemia may be  caused by:  · A tumor in the pancreas.  · Not eating enough, or not eating for long periods at a time (fasting).  · A severe infection or illness.  · Certain medicines.  What increases the risk?  Hypoglycemia is more likely to develop in:  · People who have diabetes and take medicines to lower blood glucose.  · People who abuse alcohol.  · People who have a severe illness.  What are the signs or symptoms?  Mild symptoms  Mild hypoglycemia may not cause any symptoms. If you do have symptoms, they may include:  · Hunger.  · Anxiety.  · Sweating and feeling clammy.  · Dizziness or feeling light-headed.  · Sleepiness.  · Nausea.  · Increased heart rate.  · Headache.  · Blurry vision.  · Irritability.  · Tingling or numbness around the mouth, lips, or tongue.  · A change in coordination.  · Restless sleep.  Moderate symptoms  Moderate hypoglycemia can cause:  · Mental confusion and poor judgment.  · Behavior changes.  · Weakness.  · Irregular heartbeat.  Severe symptoms  Severe hypoglycemia is a medical emergency. It can cause:  · Fainting.  · Seizures.  · Loss of consciousness (coma).  · Death.  How is this diagnosed?  Hypoglycemia is diagnosed with a blood test to measure your blood glucose level. This blood test is done while you are having symptoms. Your health care provider may also do a physical exam and review your medical history.  How is this treated?  This condition can often be treated by immediately eating or drinking something that contains sugar, such as:  · Fruit juice, 4-6 oz (120-150 mL).  · Regular soda (not diet soda), 4-6 oz (120-150 mL).  · Low-fat milk, 4 oz (120 mL).  · Several pieces of hard candy.  · Sugar or honey, 1 Tbsp (15 mL).  Treating hypoglycemia if you have diabetes  If you are alert and able to swallow safely, follow the 15:15 rule:  · Take 15 grams of a rapid-acting carbohydrate. Talk with your health care provider about how much you should take.  · Rapid-acting options  include:  ? Glucose pills (take 15 grams).  ? 6-8 pieces of hard candy.  ? 4-6 oz (120-150 mL) of fruit juice.  ? 4-6 oz (120-150 mL) of regular (not diet) soda.  ? 1 Tbsp (15 mL) honey or sugar.  · Check your blood glucose 15 minutes after you take the carbohydrate.  · If the repeat blood glucose level is still at or below 70 mg/dL (3.9 mmol/L), take 15 grams of a carbohydrate again.  · If your blood glucose level does not increase above 70 mg/dL (3.9 mmol/L) after 3 tries, seek emergency medical care.  · After your blood glucose level returns to normal, eat a meal or a snack within 1 hour.    Treating severe hypoglycemia  Severe hypoglycemia is when your blood glucose level is at or below 54 mg/dL (3 mmol/L). Severe hypoglycemia is a medical emergency. Get medical help right away.  If you have severe hypoglycemia and you cannot eat or drink, you may need an injection of glucagon. A family member or close friend should learn how to check your blood glucose and how to give you a glucagon injection. Ask your health care provider if you need to have an emergency glucagon injection kit available.  Severe hypoglycemia may need to be treated in a hospital. The treatment may include getting glucose through an IV. You may also need treatment for the cause of your hypoglycemia.  Follow these instructions at home:    General instructions  · Take over-the-counter and prescription medicines only as told by your health care provider.  · Monitor your blood glucose as told by your health care provider.  · Limit alcohol intake to no more than 1 drink a day for nonpregnant women and 2 drinks a day for men. One drink equals 12 oz of beer (355 mL), 5 oz of wine (148 mL), or 1½ oz of hard liquor (44 mL).  · Keep all follow-up visits as told by your health care provider. This is important.  If you have diabetes:  · Always have a rapid-acting carbohydrate snack with you to treat low blood glucose.  · Follow your diabetes management plan  as directed. Make sure you:  ? Know the symptoms of hypoglycemia. It is important to treat it right away to prevent it from becoming severe.  ? Take your medicines as directed.  ? Follow your exercise plan.  ? Follow your meal plan. Eat on time, and do not skip meals.  ? Check your blood glucose as often as directed. Always check before and after exercise.  ? Follow your sick day plan whenever you cannot eat or drink normally. Make this plan in advance with your health care provider.  · Share your diabetes management plan with people in your workplace, school, and household.  · Check your urine for ketones when you are ill and as told by your health care provider.  · Carry a medical alert card or wear medical alert jewelry.  Contact a health care provider if:  · You have problems keeping your blood glucose in your target range.  · You have frequent episodes of hypoglycemia.  Get help right away if:  · You continue to have hypoglycemia symptoms after eating or drinking something containing glucose.  · Your blood glucose is at or below 54 mg/dL (3 mmol/L).  · You have a seizure.  · You faint.  These symptoms may represent a serious problem that is an emergency. Do not wait to see if the symptoms will go away. Get medical help right away. Call your local emergency services (911 in the [place]  Summary  · Hypoglycemia occurs when the level of sugar (glucose) in the blood is too low.  · Hypoglycemia can happen in people who do or do not have diabetes. It can develop quickly, and it can be a medical emergency.  · Make sure you know the symptoms of hypoglycemia and how to treat it.  · Always have a rapid-acting carbohydrate snack with you to treat low blood sugar.  This information is not intended to replace advice given to you by your health care provider. Make sure you discuss any questions you have with your health care provider.  Document Released: 12/18/2006 Document Revised: 06/10/2019 Document Reviewed:  01/20/2017  [employer] Patient Education © 2020 [employer]    Preventing Hypoglycemia  Hypoglycemia occurs when the level of sugar (glucose) in the blood is too low. Hypoglycemia can happen in people who do or do not have diabetes (diabetes mellitus). It can develop quickly, and it can be a medical emergency. For most people with diabetes, a blood glucose level below 70 mg/dL (3.9 mmol/L) is considered hypoglycemia.  Glucose is a type of sugar that provides the body's main source of energy. Certain hormones (insulin and glucagon) control the level of glucose in the blood. Insulin lowers blood glucose, and glucagon increases blood glucose. Hypoglycemia can result from having too much insulin in the bloodstream, or from not eating enough food that contains glucose.  Your risk for hypoglycemia is higher:  · If you take insulin or diabetes medicines to help lower your blood glucose or help your body make more insulin.  · If you skip or delay a meal or snack.  · If you are ill.  · During and after exercise.  You can prevent hypoglycemia by working with your health care provider to adjust your meal plan as needed and by taking other precautions.  How can hypoglycemia affect me?  Mild symptoms  Mild hypoglycemia may not cause any symptoms. If you do have symptoms, they may include:  · Hunger.  · Anxiety.  · Sweating and feeling clammy.  · Dizziness or feeling light-headed.  · Sleepiness.  · Nausea.  · Increased heart rate.  · Headache.  · Blurry vision.  · Irritability.  · Tingling or numbness around the mouth, lips, or tongue.  · A change in coordination.  · Restless sleep.  If mild hypoglycemia is not recognized and treated, it can quickly become moderate or severe hypoglycemia.  Moderate symptoms  Moderate hypoglycemia can cause:  · Mental confusion and poor judgment.  · Behavior changes.  · Weakness.  · Irregular heartbeat.  Severe symptoms  Severe hypoglycemia is a medical emergency. It can  cause:  · Fainting.  · Seizures.  · Loss of consciousness (coma).  · Death.  What nutrition changes can be made?  · Work with your health care provider or diet and nutrition specialist (dietitian) to make a healthy meal plan that is right for you. Follow your meal plan carefully.  · Eat meals at regular times.  · If recommended by your health care provider, have snacks between meals.  · Donot skip or delay meals or snacks. You can be at risk for hypoglycemia if you are not getting enough carbohydrates.  What lifestyle changes can be made?    · Work closely with your health care provider to manage your blood glucose. Make sure you know:  ? Your goal blood glucose levels.  ? How and when to check your blood glucose.  ? The symptoms of hypoglycemia. It is important to treat it right away to keep it from becoming severe.  · Do not drink alcohol on an empty stomach.  · When you are ill, check your blood glucose more often than usual. Follow your sick day plan whenever you cannot eat or drink normally. Make this plan in advance with your health care provider.  · Always check your blood glucose before, during, and after exercise.  How is this treated?  This condition can often be treated by immediately eating or drinking something that contains sugar, such as:  · Fruit juice, 4-6 oz (120-150 mL).  · Regular (not diet) soda, 4-6 oz (120-150 mL).  · Low-fat milk, 4 oz (120 mL).  · Several pieces of hard candy.  · Sugar or honey, 1 Tbsp (15 mL).  Treating hypoglycemia if you have diabetes  If you are alert and able to swallow safely, follow the 15:15 rule:  · Take 15 grams of a rapid-acting carbohydrate. Talk with your health care provider about how much you should take.  · Rapid-acting options include:  ? Glucose pills (take 15 grams).  ? 6-8 pieces of hard candy.  ? 4-6 oz (120-150 mL) of fruit juice.  ? 4-6 oz (120-150 mL) of regular (not diet) soda.  · Check your blood glucose 15 minutes after you take the  carbohydrate.  · If the repeat blood glucose level is still at or below 70 mg/dL (3.9 mmol/L), take 15 grams of a carbohydrate again.  · If your blood glucose level does not increase above 70 mg/dL (3.9 mmol/L) after 3 tries, seek emergency medical care.  · After your blood glucose level returns to normal, eat a meal or a snack within 1 hour.  Treating severe hypoglycemia  Severe hypoglycemia is when your blood glucose level is at or below 54 mg/dL (3 mmol/L). Severe hypoglycemia is a medical emergency. Get medical help right away.  If you have severe hypoglycemia and you cannot eat or drink, you may need an injection of glucagon. A family member or close friend should learn how to check your blood glucose and how to give you a glucagon injection. Ask your health care provider if you need to have an emergency glucagon injection kit available.  Severe hypoglycemia may need to be treated in a hospital. The treatment may include getting glucose through an IV. You may also need treatment for the cause of your hypoglycemia.  Where to find more information  · American Diabetes Association: www.diabetes.org  · National [place] of Diabetes and Digestive and Kidney Diseases: www.niddk.nih.gov  Contact a health care provider if:  · You have problems keeping your blood glucose in your target range.  · You have frequent episodes of hypoglycemia.  Get help right away if:  · You continue to have hypoglycemia symptoms after eating or drinking something containing glucose.  · Your blood glucose level is at or below 54 mg/dL (3 mmol/L).  · You faint.  · You have a seizure.  These symptoms may represent a serious problem that is an emergency. Do not wait to see if the symptoms will go away. Get medical help right away. Call your local emergency services (911 in the [place]  Summary  · Know the symptoms of hypoglycemia, and when you are at risk for it (such as during exercise or when you are sick). Check your blood glucose often when  you are at risk for hypoglycemia.  · Hypoglycemia can develop quickly, and it can be dangerous if it is not treated right away. If you have a history of severe hypoglycemia, make sure you know how to use your glucagon injection kit.  · Make sure you know how to treat hypoglycemia. Keep a carbohydrate snack available when you may be at risk for hypoglycemia.  This information is not intended to replace advice given to you by your health care provider. Make sure you discuss any questions you have with your health care provider.  Document Released: 08/15/2018 Document Revised: 04/10/2020 Document Reviewed: 08/15/2018  Elsevier Patient Education © 2020 [employer]    Syncope    Syncope refers to a condition in which a person temporarily loses consciousness. Syncope may also be called fainting or passing out. It is caused by a sudden decrease in blood flow to the brain. Even though most causes of syncope are not dangerous, syncope can be a sign of a serious medical problem. Your health care provider may do tests to find the reason why you are having syncope.  Signs that you may be about to faint include:  · Feeling dizzy or light-headed.  · Feeling nauseous.  · Seeing all white or all black in your field of vision.  · Having cold, clammy skin.  If you faint, get medical help right away. Call your local emergency services (911 in the [place] Do not drive yourself to the hospital.  Follow these instructions at home:  Pay attention to any changes in your symptoms. Take these actions to stay safe and to help relieve your symptoms:  Lifestyle  · Do not drive, use machinery, or play sports until your health care provider says it is okay.  · Do not drink alcohol.  · Do not use any products that contain nicotine or tobacco, such as cigarettes and e-cigarettes. If you need help quitting, ask your health care provider.  · Drink enough fluid to keep your urine pale yellow.  General instructions  · Take over-the-counter and  prescription medicines only as told by your health care provider.  · If you are taking blood pressure or heart medicine, get up slowly and take several minutes to sit and then stand. This can reduce dizziness or light-headedness.  · Have someone stay with you until you feel stable.  · If you start to feel like you might faint, lie down right away and raise (elevate) your feet above the level of your heart. Breathe deeply and steadily. Wait until all the symptoms have passed.  · Keep all follow-up visits as told by your health care provider. This is important.  Get help right away if you:  · Have a severe headache.  · Faint once or repeatedly.  · Have pain in your chest, abdomen, or back.  · Have a very fast or irregular heartbeat (palpitations).  · Have pain when you breathe.  · Are bleeding from your mouth or rectum, or you have black or tarry stool.  · Have a seizure.  · Are confused.  · Have trouble walking.  · Have severe weakness.  · Have vision problems.  These symptoms may represent a serious problem that is an emergency. Do not wait to see if your symptoms will go away. Get medical help right away. Call your local emergency services (911 in the [place] Do not drive yourself to the hospital.  Summary  · Syncope refers to a condition in which a person temporarily loses consciousness. It is caused by a sudden decrease in blood flow to the brain.  · Signs that you may be about to faint include dizziness, feeling light-headed, feeling nauseous, sudden vision changes, or cold, clammy skin.  · Although most causes of syncope are not dangerous, syncope can be a sign of a serious medical problem. If you faint, get medical help right away.  This information is not intended to replace advice given to you by your health care provider. Make sure you discuss any questions you have with your health care provider.  Document Released: 12/18/2006 Document Revised: 11/30/2018 Document Reviewed: 11/26/2018  Elsevier Patient  Education © 2020 Else[employer] Inc.

## 2021-08-20 NOTE — ED NOTES
Med rec completed per Pt at bedside.  Allergies reviewed with Pt. No known drug allergies.  Pt states that he has stopped taking his lisinopril because his blood pressure has been too low; last dose of lisinopril >1 week ago.  Pt takes ASPIRIN 81 mg daily.  No oral antibiotics in last 30 days.  Pt's preferred pharmacy: [facility] for long-term prescriptions or [facility] on [facility] for acute prescriptions.

## 2021-08-20 NOTE — CARE PLAN
The patient is Stable - Low risk of patient condition declining or worsening    Shift Goals  Clinical Goals: pt will not require oxygen    Progress made toward(s) clinical / shift goals:  pt has not required oxygen.    Patient is not progressing towards the following goals:      Problem: Knowledge Deficit - Standard  Goal: Patient and family/care givers will demonstrate understanding of plan of care, disease process/condition, diagnostic tests and medications  Outcome: Progressing     Problem: Fall Risk  Goal: Patient will remain free from falls  Outcome: Progressing

## 2021-08-20 NOTE — ED PROVIDER NOTES
ED Provider Note    Scribed for Nikki Zhang M.D. by Joanna Corrales. 8/19/2021  5:34 PM    Primary care provider: Florinda Pascual M.D.  Means of arrival: EMS  History obtained from: patient   History limited by: none  CHIEF COMPLAINT  Chief Complaint   Patient presents with   • Syncope     BIB EMS from home. pt is covid positive. past 2-4 days pt has been passing out every time he stand up. today at 0930 pt passed out and hit head. negative LOC, pt not on blood thinners.    • Fever   • Chills   • Weakness     generalized       HPI  Beni Moore is a 50 y.o. male who presents to the ER with complaint of multiple syncopal episodes within the last several days.  Patient says he is passed out 4 times in the last 3 to 4 days.  He says when he stands up his blood pressure drops.  Blood pressure was 88 systolic today when he had a syncopal episode.  Patient was diagnosed with COVID-19 7 days ago.  He got sick with COVID-19 symptoms 9 days ago.  Both of his parents are ill at home with Covid.  He says he really has not had much of a cough with his Covid.  He does not really feel short of breath.  He has not been eating or drinking much normally.  He has had nausea.  No chest pain.  No coughing of phlegm or blood.  No pain or swelling in his legs.  No history of DVT or PE.  He said today he struck his head on the wood floor when he passed out.  He is a diabetic.  He uses both insulin and pills.  Says his blood sugars have been running low lately.  He checked his sugar today and it was in the 60s.  He describes persistent chills, fevers and generalized weakness.    REVIEW OF SYSTEMS  See HPI for further details. All other systems are negative.    PAST MEDICAL HISTORY  Past Medical History:   Diagnosis Date   • Diabetes (HCC)    • Hypertension        FAMILY HISTORY  Family History   Problem Relation Age of Onset   • No Known Problems Mother    • Diabetes Father    • Heart Disease Father    • Diabetes Maternal Grandmother    •  Heart Disease Maternal Grandfather    • Lung Disease Paternal Grandmother    • Cancer Paternal Grandmother    • Cancer Paternal Grandfather    • Lung Cancer Paternal Grandfather        SOCIAL HISTORY  Social History     Socioeconomic History   • Marital status:    • Highest education level: 12th grade   Tobacco Use   • Smoking status: Never Smoker   • Smokeless tobacco: Never Used   Vaping Use   • Vaping Use: Never used   Substance and Sexual Activity   • Alcohol use: No   • Drug use: No     SURGICAL HISTORY  Past Surgical History:   Procedure Laterality Date   • TOE AMPUTATION Right 7/2/2018    Procedure: Transmetatarsal amputation;  Surgeon: Ravi Bernardo M.D.;  Location: SURGERY [facility];  Service: Orthopedics   • ACHILLES TENDON REPAIR Right 7/2/2018    Procedure: ACHILLES LENGTHENING;  Surgeon: Ravi Bernardo M.D.;  Location: SURGERY [facility];  Service: Orthopedics   • IRRIGATION & DEBRIDEMENT ORTHO Right 7/2/2018    Procedure: IRRIGATION & DEBRIDEMENT ORTHO;  Surgeon: Ravi Bernardo M.D.;  Location: SURGERY [facility];  Service: Orthopedics   • OTHER     • OTHER ABDOMINAL SURGERY     • OTHER ORTHOPEDIC SURGERY     • TONSILLECTOMY     • VASECTOMY         CURRENT MEDICATIONS  Home Medications     Reviewed by Ahmet Ramirez (Pharmacy Tech) on 08/19/21 at 1856  Med List Status: Complete   Medication Last Dose Status   aspirin 81 MG EC tablet 8/19/2021 Active   atorvastatin (LIPITOR) 80 MG tablet 8/18/2021 Active   carvedilol (COREG) 6.25 MG Tab 8/19/2021 Active   cholestyramine light (PREVALITE) 4 GM/DOSE powder 8/19/2021 Active   Dulaglutide (TRULICITY) 1.5 MG/0.5ML Solution Pen-injector 8/16/2021 Active   gabapentin (NEURONTIN) 100 MG Cap 8/18/2021 Active   glimepiride (AMARYL) 4 MG Tab 8/19/2021 Active   ibuprofen (MOTRIN) 200 MG Tab 8/17/2021 Active   insulin glargine (LANTUS) 100 UNIT/ML Solution 8/18/2021 Active   lisinopril (PRINIVIL) 10 MG Tab >1 week Active  "  omeprazole (PRILOSEC) 20 MG delayed-release capsule 8/19/2021 Active   Ranibizumab (LUCENTIS IZ) >6 weeks Active                ALLERGIES  No Known Allergies    PHYSICAL EXAM  VITAL SIGNS: BP (!) 169/79   Pulse 97   Temp (!) 38.2 °C (100.8 °F) (Temporal)   Resp 18   Ht 1.854 m (6' 1\")   Wt 99.8 kg (220 lb)   SpO2 94%   BMI 29.03 kg/m²      General: Well-developed, well-nourished.  Ill-appearing  HENT: Normocephalic, atraumatic; Bilateral external ears normal;  Oral pharyngeal examination deferred due to COVID-19 outbreak and lack of oropharyngeal complaint.  Eyes: PERRL, EOMI, Conjunctiva normal. No discharge.   Neck:  Supple, nontender midline; No stridor; No nuchal rigidity.   Lymphatic: No cervical lymphadenopathy noted.   Cardiovascular: Regular rate and rhythm without murmurs, rubs, or gallop.   Thorax & Lungs: Decreased breath sounds throughout.  Coarse rhonchi throughout.  Deep breathing precipitates cough.  Nontender chest wall. No crepitus or subcutaneous air  Abdomen: Soft, nontender, bowel sounds normal. No obvious masses; No pulsatile masses; no rebound, guarding, or peritoneal signs.   Skin: Good color; warm and dry without rash or petechia.  Back: Nontender, No CVA tenderness.   Extremities: Distal radial, dorsalis pedis, posterior tibial pulses are equal bilaterally; No edema; Nontender calves or saphenous, No cyanosis, No clubbing.   Musculoskeletal: Good range of motion in all major joints. No tenderness to palpation or major deformities noted.   Neurologic: Alert & oriented x 4, clear speech    EKG  12 Lead EKG; Interpreted by me as shown below.     LABS/RADIOLOGY/PROCEDURES  Results for orders placed or performed during the hospital encounter of 08/19/21   Lactic acid (lactate)   Result Value Ref Range    Lactic Acid 0.8 0.5 - 2.0 mmol/L   CBC WITH DIFFERENTIAL   Result Value Ref Range    WBC 3.0 (L) 4.8 - 10.8 K/uL    RBC 3.83 (L) 4.70 - 6.10 M/uL    Hemoglobin 11.5 (L) 14.0 - 18.0 g/dL "    Hematocrit 33.8 (L) 42.0 - 52.0 %    MCV 88.3 81.4 - 97.8 fL    MCH 30.0 27.0 - 33.0 pg    MCHC 34.0 33.7 - 35.3 g/dL    RDW 41.7 35.9 - 50.0 fL    Platelet Count 122 (L) 164 - 446 K/uL    MPV 11.5 9.0 - 12.9 fL    Neutrophils-Polys 56.20 44.00 - 72.00 %    Lymphocytes 33.00 22.00 - 41.00 %    Monocytes 10.10 0.00 - 13.40 %    Eosinophils 0.00 0.00 - 6.90 %    Basophils 0.00 0.00 - 1.80 %    Immature Granulocytes 0.70 0.00 - 0.90 %    Nucleated RBC 0.00 /100 WBC    Neutrophils (Absolute) 1.67 (L) 1.82 - 7.42 K/uL    Lymphs (Absolute) 0.98 (L) 1.00 - 4.80 K/uL    Monos (Absolute) 0.30 0.00 - 0.85 K/uL    Eos (Absolute) 0.00 0.00 - 0.51 K/uL    Baso (Absolute) 0.00 0.00 - 0.12 K/uL    Immature Granulocytes (abs) 0.02 0.00 - 0.11 K/uL    NRBC (Absolute) 0.00 K/uL   COMP METABOLIC PANEL   Result Value Ref Range    Sodium 133 (L) 135 - 145 mmol/L    Potassium 4.1 3.6 - 5.5 mmol/L    Chloride 104 96 - 112 mmol/L    Co2 19 (L) 20 - 33 mmol/L    Anion Gap 10.0 7.0 - 16.0    Glucose 52 (L) 65 - 99 mg/dL    Bun 18 8 - 22 mg/dL    Creatinine 1.32 0.50 - 1.40 mg/dL    Calcium 7.8 (L) 8.5 - 10.5 mg/dL    AST(SGOT) 35 12 - 45 U/L    ALT(SGPT) 31 2 - 50 U/L    Alkaline Phosphatase 41 30 - 99 U/L    Total Bilirubin 1.1 0.1 - 1.5 mg/dL    Albumin 3.1 (L) 3.2 - 4.9 g/dL    Total Protein 6.1 6.0 - 8.2 g/dL    Globulin 3.0 1.9 - 3.5 g/dL    A-G Ratio 1.0 g/dL   URINALYSIS    Specimen: Urine   Result Value Ref Range    Color Yellow     Character Clear     Specific Gravity 1.012 <1.035    Ph 5.0 5.0 - 8.0    Glucose Negative Negative mg/dL    Ketones Negative Negative mg/dL    Protein 300 (A) Negative mg/dL    Bilirubin Negative Negative    Urobilinogen, Urine 0.2 Negative    Nitrite Negative Negative    Leukocyte Esterase Negative Negative    Occult Blood Moderate (A) Negative    Micro Urine Req Microscopic    COV-2, FLU A/B, AND RSV BY PCR (2-4 HOURS CEPHEID): Collect NP swab in VTM    Specimen: Nasopharyngeal; Respirate   Result  Value Ref Range    Influenza virus A RNA Negative Negative    Influenza virus B, PCR Negative Negative    RSV, PCR Negative Negative    SARS-CoV-2 by PCR DETECTED (AA)     SARS-CoV-2 Source NP Swab    TROPONIN   Result Value Ref Range    Troponin T 41 (H) 6 - 19 ng/L   ESTIMATED GFR   Result Value Ref Range    GFR If African American >60 >60 mL/min/1.73 m 2    GFR If Non  57 (A) >60 mL/min/1.73 m 2   URINE MICROSCOPIC (W/UA)   Result Value Ref Range    WBC 0-2 (A) /hpf    RBC 0-2 (A) /hpf    Bacteria Negative None /hpf    Epithelial Cells Negative /hpf    Hyaline Cast 0-2 /lpf   EKG (NOW)   Result Value Ref Range    Report       [facility] Emergency Dept.    Test Date:  2021  Pt Name:    HELIO BARROW                     Department: ER  MRN:        [id number]                      Room:       [facility]  Gender:     Male                         Technician: HRR  :        1970                   Requested By:ER TRIAGE PROTOCOL  Order #:    [id number]                    Reading MD: Nikki Zhang    Measurements  Intervals                                Axis  Rate:       97                           P:          31  IL:         135                          QRS:        66  QRSD:       102                          T:  QT:         335  QTc:        425    Interpretive Statements  Sinus rhythm rate 97  Normal axis  Normal intervals  T waves flat limb leads and V5-V6  No ST elevation or depression  Borderline T wave abnormalities  Compared to ECG 2019 21:14:37  T-wave abnormality now present  Inferior Q waves no longer present  Q waves no longer present  Electronically Signed On 2021 21:12:24 P DT by Nikki Zhang            CT-HEAD W/O   Final Result         NO ACUTE ABNORMALITIES ARE NOTED ON CT SCAN OF THE HEAD.      Possible sinusitis.      CT-CTA CHEST PULMONARY ARTERY W/ RECONS   Final Result      1.  No CT evidence of pulmonary embolism.      2.  Pulmonary opacifications are  identified which are consistent with Covid 19 pneumonia.      3.  Cholelithiasis.            DX-CHEST-PORTABLE (1 VIEW)   Final Result      Hypoinflation without other evidence for acute cardiopulmonary disease.      EC-ECHOCARDIOGRAM COMPLETE W/O CONT    (Results Pending)       COURSE & MEDICAL DECISION MAKING  Pertinent Labs & Imaging studies reviewed. (See chart for details)    5:34 PM - Patient seen and examined at bedside. Discussed plan of care, including labs and radiology. Patient agrees to the plan of care. The patient will be medicated with tylenol 650 mg. Ordered for Ct head, CTA chest, chest x-ray, labs, and EKG to evaluate his symptoms.     9:13 PM - Reviewed the patient's lab and imaging results. Paged hospitalist.    9:21 PM - nursing alerted the patient is hypoglycemic at 55. He takes trulicity and lantus. Spoke with pharmacy who states trulicity lasts for 7 days and he may be hypoglycemic secondary to decreased appetite with COVID. He will be treated with dextrose 50%.    9:52 PM - I discussed the patient's case and the above findings with Dr. Cruz (Hospitalist) who agreed to evaluate patient for hospitalization. Patients care will be transferred at this time.       Patient presents to the ER complaining of multiple syncopal episodes within the last 3 to 4 days.  He says he is passed out 4 times.  He says he is typically walking down the tiwari or across the house when he just passes out.  He does not really have much warning when he passes out.  Today he fell and hit his head on the wood floor.  CT brain is negative.  He is Covid positive.  He tested positive for COVID-19 about a week ago.  He was sick for a few days before he got his Covid test.  He does not feel particularly short of breath.  O2 sats are in the low 90s but he does desaturate down to 88% when he sleeps.  He says he is a coughing much but he does cough quite a bit when I am trying to examine his lungs.  He has coarse rhonchi throughout  both lung fields.  EKG does not reveal any ST elevation or depression.  Troponin is minimally elevated at 41.  Blood sugar was 52.  He was given some dextrose.  He is on Lantus and Trulicity.  He says he has been eating and drinking normally throughout his Covid illness.  I suspect he might not be eating or drinking as much as he thinks he is.  His orthostatic vital signs were positive on scene.  He was given a liter of fluids by EMS and repeat orthostatic vital signs are normal here in the ER.  Since patient has Covid and is passed out multiple times I was concerned about pulmonary embolism.  CT scan of the chest was obtained and it is negative for PE.  He was febrile to 100.8 here in the ER.  He defervesced with some Tylenol.  At this time patient's syncopal episodes are either related to hypoglycemia, orthostatic hypotension and dehydration, or possibly some sort of Covid myocarditis of the heart.  I think it is reasonable to hospitalize him since he has passed out multiple times in last few days and keep him on the monitor, watch his blood sugars, possibly do echocardiogram to further evaluate his pump function.  Patient is amenable to hospitalization.  I spoke with Dr. Cruz, hospitalist on-call, and he will kindly evaluate the patient for hospitalization.    DISPOSITION:  Patient will be hospitalized by Dr. Cruz in guarded condition.     FINAL IMPRESSION  1. COVID-19 Acute   2. Syncope, unspecified syncope type Acute   3. Hypoglycemia Acute   4. Orthostatic hypotension Acute          This dictation has been created using voice recognition software. The accuracy of the dictation is limited by the abilities of the software. I expect there may be some errors of grammar and possibly content. I made every attempt to manually correct the errors within my dictation. However, errors related to voice recognition software may still exist and should be interpreted within the appropriate context.     I, Joanna Corrales (Jordy),  am scribing for, and in the presence of, Nikki Zhang M.D..    Electronically signed by: Joanna Corrales (Scribe), 8/19/2021    INikki M.D. personally performed the services described in this documentation, as scribed by Joanna Corrales in my presence, and it is both accurate and complete. C    The note accurately reflects work and decisions made by me.  Nikki Zhang M.D.  8/19/2021  6:24 PM

## 2021-08-20 NOTE — ASSESSMENT & PLAN NOTE
Patient diagnosed with COVID 9 days prior  Confirmed on today's repeat testing  Noted to have fevers in the ED, maintain saturations on room air  - Conservative use of IVF  - Albuterol, O2 therapy, and proning as needed  - Tessalon Perles for Sx management  - Holding lasix as no obvious signs of volume overload.  - holding Decadron 6mg qd as not hypoxic  - Lovenox for VTE ppx

## 2021-08-20 NOTE — ASSESSMENT & PLAN NOTE
This is Sepsis Present on admission  SIRS criteria identified on my evaluation include: Tachycardia, with heart rate greater than 90 BPM and Leukopenia, with WBC less than 4,000  Source is COVID-19  Sepsis protocol initiated  Fluid resuscitation ordered per protocol  IV antibiotics as appropriate for source of sepsis  While organ dysfunction may be noted elsewhere in this problem list or in the chart, degree of organ dysfunction does not meet CMS criteria for severe sepsis

## 2021-08-20 NOTE — ASSESSMENT & PLAN NOTE
Multiple episodes of syncope the past 4 days  Patient noted to be hypoglycemic, but reports that syncope occurred without incidence of hypoglycemia  No prior history of syncope or change in medications  CT imaging unremarkable  - Telemetry monitoring  - Fall precautions  - Check orthostatics  - TTE

## 2021-08-20 NOTE — H&P
[facility] Medicine History & Physical Note    Date of Service  8/19/2021    Primary Care Physician  Florinda Pascual M.D.    Consultants  None    Specialist Names: None    Code Status  Full Code    Chief Complaint  Chief Complaint   Patient presents with   • Syncope     BIB EMS from home. pt is covid positive. past 2-4 days pt has been passing out every time he stand up. today at 0930 pt passed out and hit head. negative LOC, pt not on blood thinners.    • Fever   • Chills   • Weakness     generalized       History of Presenting Illness  Beni Moore is a 50 y.o. male with PMH of diabetes, hypertension, and recent diagnosis of COVID-19 9 days ago.  Who presented 8/19/2021 with syncopal episodes.  Patient reports that for the past 4 days he has had several instances of syncope.  He reports that he gets up and is walking around the house and after a few seconds he just passes out and falls of the ground.  Denies any bowel or bladder incontinence.  He denies any prior history of syncope or seizures in the past.  He does report that he was diagnosed with COVID-19 9 days ago and he thought he was recovering well from it.  Continues to have some fevers and mild shortness of breath as well as nonproductive cough, but overall does not feel horribly.  He additionally does admit to having incidences of hypoglycemia into the 60s over the past few days.  However, patient is adamant that the incidences of syncope occured prior to him having issues of hypoglycemia.  Patient again had a syncopal episode today and EMS was called.  On EMS arrival, patient was reported to have a BS of 50 and he was given 15 g of glucose with improvement of his sugars to 89.  He was also given 900 cc of NS as he was found to have a positive orthostatics.    ED: Temperature 100.8-> 98.8, -97, //70.  WBC 3.0, Hb 11.5, platelets 122, , CO2 19, glucose 52, lactic acid 0.8, troponin 41, Covid positive.  CXR was performed with  hypoinflation.  CTA of the chest was negative for PE, but did see opacifications concerning for Covid pneumonia.  CT of the head was unremarkable.  Patient was given D50, and Tylenol 650 mg.      I discussed the plan of care with patient.    Review of Systems  Review of Systems   Constitutional: Positive for fever and malaise/fatigue. Negative for chills and weight loss.   HENT: Negative for hearing loss and sore throat.    Eyes: Negative for blurred vision and pain.   Respiratory: Positive for cough and shortness of breath. Negative for hemoptysis and wheezing.    Cardiovascular: Negative for chest pain, palpitations, orthopnea and leg swelling.   Gastrointestinal: Negative for abdominal pain, blood in stool, constipation, diarrhea, nausea and vomiting.   Genitourinary: Negative for dysuria and hematuria.   Musculoskeletal: Negative for joint pain and myalgias.   Skin: Negative for rash.   Neurological: Positive for loss of consciousness. Negative for dizziness and weakness.       Past Medical History   has a past medical history of Diabetes (HCC) and Hypertension.    Surgical History   has a past surgical history that includes other orthopedic surgery; other; toe amputation (Right, 7/2/2018); achilles tendon repair (Right, 7/2/2018); irrigation & debridement ortho (Right, 7/2/2018); vasectomy; tonsillectomy; and other abdominal surgery.     Family History  family history includes Cancer in his paternal grandfather and paternal grandmother; Diabetes in his father and maternal grandmother; Heart Disease in his father and maternal grandfather; Lung Cancer in his paternal grandfather; Lung Disease in his paternal grandmother; No Known Problems in his mother.   Family history reviewed with patient. There is no family history that is pertinent to the chief complaint.     Social History   reports that he has never smoked. He has never used smokeless tobacco. He reports that he does not drink alcohol and does not use  drugs.    Allergies  No Known Allergies    Medications  Prior to Admission Medications   Prescriptions Last Dose Informant Patient Reported? Taking?   Dulaglutide (TRULICITY) 1.5 MG/0.5ML Solution Pen-injector 2021 at UNKNOWN TIME Patient No No   Sig: Inject 0.5 mL under the skin every 7 days for 90 days.   Ranibizumab (LUCENTIS IZ) >6 weeks at UNKNOWN TIME Patient Yes No   Si Dose by Intravitreal route every 8 weeks.   aspirin 81 MG EC tablet 2021 at 0800 Patient No No   Sig: Take 1 tablet by mouth every day.   atorvastatin (LIPITOR) 80 MG tablet 2021 at 2130 Patient No No   Sig: Take 1 tablet by mouth every evening.   carvedilol (COREG) 6.25 MG Tab 2021 at 0800 Patient Yes No   Sig: Take 6.25 mg by mouth 2 times a day.   cholestyramine light (PREVALITE) 4 GM/DOSE powder 2021 at 1530 Patient Yes No   Sig: Take 4 g by mouth 2 times a day. Mix 1 scoop (4 g) in applesauce or juice and take twice daily.   gabapentin (NEURONTIN) 100 MG Cap 2021 at 2130 Patient Yes Yes   Sig: Take 100 mg by mouth at bedtime.   glimepiride (AMARYL) 4 MG Tab 2021 at 0800 Patient No No   Sig: Take 1 tablet by mouth every day.   ibuprofen (MOTRIN) 200 MG Tab 2021 at PRN Patient Yes Yes   Sig: Take 400 mg by mouth every 6 hours as needed for Mild Pain.   insulin glargine (LANTUS) 100 UNIT/ML Solution 2021 at 2130 Patient Yes Yes   Sig: Inject 10 Units under the skin every evening.   lisinopril (PRINIVIL) 10 MG Tab >1 week at STOPPED by patient Patient No No   Sig: Take 1 tablet by mouth every day.   omeprazole (PRILOSEC) 20 MG delayed-release capsule 2021 at 0800 Patient Yes No   Sig: Take 20 mg by mouth every morning.      Facility-Administered Medications: None       Physical Exam  Temp:  [37.1 °C (98.8 °F)-38.2 °C (100.8 °F)] 37.1 °C (98.8 °F)  Pulse:  [] 100  Resp:  [16-20] 16  BP: (120-169)/(70-80) 120/70  SpO2:  [88 %-95 %] 88 %    Physical Exam  Vitals and nursing note  reviewed.   Constitutional:       General: He is not in acute distress.     Appearance: Normal appearance.   HENT:      Mouth/Throat:      Mouth: Mucous membranes are moist.   Eyes:      Extraocular Movements: Extraocular movements intact.   Cardiovascular:      Rate and Rhythm: Normal rate and regular rhythm.      Heart sounds: No murmur heard.   No gallop.    Pulmonary:      Effort: Pulmonary effort is normal.      Breath sounds: Rales (Right lung base) present. No wheezing or rhonchi.   Abdominal:      General: Abdomen is flat. Bowel sounds are normal. There is no distension.      Palpations: Abdomen is soft.      Tenderness: There is no abdominal tenderness.   Musculoskeletal:         General: No deformity. Normal range of motion.      Right lower leg: No edema.      Left lower leg: No edema.   Neurological:      General: No focal deficit present.      Mental Status: He is alert and oriented to person, place, and time.         Laboratory:  Recent Labs     08/19/21  1734   WBC 3.0*   RBC 3.83*   HEMOGLOBIN 11.5*   HEMATOCRIT 33.8*   MCV 88.3   MCH 30.0   MCHC 34.0   RDW 41.7   PLATELETCT 122*   MPV 11.5     Recent Labs     08/19/21  1734   SODIUM 133*   POTASSIUM 4.1   CHLORIDE 104   CO2 19*   GLUCOSE 52*   BUN 18   CREATININE 1.32   CALCIUM 7.8*     Recent Labs     08/19/21  1734   ALTSGPT 31   ASTSGOT 35   ALKPHOSPHAT 41   TBILIRUBIN 1.1   GLUCOSE 52*         No results for input(s): NTPROBNP in the last 72 hours.      Recent Labs     08/19/21  1843   TROPONINT 41*       Imaging:  CT-HEAD W/O   Final Result         NO ACUTE ABNORMALITIES ARE NOTED ON CT SCAN OF THE HEAD.      Possible sinusitis.      CT-CTA CHEST PULMONARY ARTERY W/ RECONS   Final Result      1.  No CT evidence of pulmonary embolism.      2.  Pulmonary opacifications are identified which are consistent with Covid 19 pneumonia.      3.  Cholelithiasis.            DX-CHEST-PORTABLE (1 VIEW)   Final Result      Hypoinflation without other evidence  for acute cardiopulmonary disease.      EC-ECHOCARDIOGRAM COMPLETE W/O CONT    (Results Pending)       X-Ray:  I have personally reviewed the images and compared with prior images.    Assessment/Plan:  I anticipate this patient is appropriate for observation status at this time.    * Syncope and collapse- (present on admission)  Assessment & Plan  Multiple episodes of syncope the past 4 days  Patient noted to be hypoglycemic, but reports that syncope occurred without incidence of hypoglycemia  No prior history of syncope or change in medications  CT imaging unremarkable  - Telemetry monitoring  - Fall precautions  - Check orthostatics  - TTE      Hypoglycemia- (present on admission)  Assessment & Plan  Serial glucose checks  holding home diabetes medications  ISS for coverage  hypoglycemia protocol    Hyponatremia- (present on admission)  Assessment & Plan  Received 900 cc of NS from EMS  -Continue to monitor and replete IV fluids as needed    Elevated troponin- (present on admission)  Assessment & Plan  Trend troponin  telemetry monitoring  echocardiogram    Sepsis (HCC)- (present on admission)  Assessment & Plan  This is Sepsis Present on admission  SIRS criteria identified on my evaluation include: Tachycardia, with heart rate greater than 90 BPM and Leukopenia, with WBC less than 4,000  Source is COVID-19  Sepsis protocol initiated  Fluid resuscitation ordered per protocol  IV antibiotics as appropriate for source of sepsis  While organ dysfunction may be noted elsewhere in this problem list or in the chart, degree of organ dysfunction does not meet CMS criteria for severe sepsis          COVID-19- (present on admission)  Assessment & Plan  Patient diagnosed with COVID 9 days prior  Confirmed on today's repeat testing  Noted to have fevers in the ED, maintain saturations on room air  - Conservative use of IVF  - Albuterol, O2 therapy, and proning as needed  - Tessalon Perles for Sx management  - Holding lasix as  no obvious signs of volume overload.  - holding Decadron 6mg qd as not hypoxic  - Lovenox for VTE ppx        Controlled type 2 diabetes mellitus, with long-term current use of insulin ([facility])- (present on admission)  Assessment & Plan  Noted to have into his hypoglycemia past few days  glucose at home was 67  glucose 52 in ED  s/p D50 x1  -holding home glimepiride 4 mg and Lantus 10 units nightly.  -Patient also noted to be on Trulicity which he gets q. weekly  -we'll initiate ISS  -serial point-of-care glucose testing  -hypoglycemia protocol, will consider glucose infusion if hypoglycemia persists    Essential hypertension- (present on admission)  Assessment & Plan  Continue with home carvedilol 6.25 mg twice daily, and lisinopril 10 mg daily      VTE prophylaxis: enoxaparin ppx

## 2021-08-20 NOTE — PROGRESS NOTES
4 Eyes Skin Assessment Completed by JULIANO Carlos and JULIANO Jorgensen.    Head WDL  Ears WDL  Nose WDL  Mouth WDL  Neck WDL  Breast/Chest WDL  Shoulder Blades WDL  Spine WDL  (R) Arm/Elbow/Hand WDL  (L) Arm/Elbow/Hand WDL  Abdomen WDL  Groin WDL  Scrotum/Coccyx/Buttocks WDL  (R) Leg Scab and Abrasion  (L) Leg Scab and Abrasion  (R) Heel/Foot/Toe WDL  (L) Heel/Foot/Toe WDL          Devices In Places Tele Box and Pulse Ox      Interventions In Place N/A    Possible Skin Injury No    Pictures Uploaded Into Epic N/A  Wound Consult Placed N/A  RN Wound Prevention Protocol Ordered No

## 2021-08-20 NOTE — DISCHARGE SUMMARY
Discharge Summary    CHIEF COMPLAINT ON ADMISSION  Chief Complaint   Patient presents with   • Syncope     BIB EMS from home. pt is covid positive. past 2-4 days pt has been passing out every time he stand up. today at 0930 pt passed out and hit head. negative LOC, pt not on blood thinners.    • Fever   • Chills   • Weakness     generalized       Reason for Admission  EMS     Admission Date  8/19/2021    CODE STATUS  Full Code    HPI & HOSPITAL COURSE  Beni Moore is a 50 y.o. male with PMH of diabetes, hypertension, and recent diagnosis of COVID-19 9 days ago.  Who presented 8/19/2021 with syncopal episodes.  Patient reports that for the past 4 days he has had several instances of syncope.  He reports that he gets up and is walking around the house and after a few seconds he just passes out and falls of the ground.  Denies any bowel or bladder incontinence.  He denies any prior history of syncope or seizures in the past.  He does report that he was diagnosed with COVID-19 9 days ago and he thought he was recovering well from it.  Continues to have some fevers and mild shortness of breath as well as nonproductive cough, but overall does not feel horribly.  He additionally does admit to having incidences of hypoglycemia into the 60s over the past few days.  However, patient is adamant that the incidences of syncope occured prior to him having issues of hypoglycemia.  Patient again had a syncopal episode today and EMS was called.  On EMS arrival, patient was reported to have a BS of 50 and he was given 15 g of glucose with improvement of his sugars to 89.  He was also given 900 cc of NS as he was found to have a positive orthostatics.     ED: Temperature 100.8-> 98.8, -97, //70.  WBC 3.0, Hb 11.5, platelets 122, , CO2 19, glucose 52, lactic acid 0.8, troponin 41, Covid positive.  CXR was performed with hypoinflation.  CTA of the chest was negative for PE, but did see opacifications  concerning for Covid pneumonia.  CT of the head was unremarkable.  Patient was given D50, and Tylenol 650 mg.    8/20: Patient seen examined at bedside.  Currently off of oxygen ambulating and while sitting in bed greater than 90%.  Patient feeling better.  Will discharge on Decadron.  Patient had been hyperglycemic secondary to poor oral intake.  Patient understands how to adjust his insulin at home.  Also discussed with him that he needs to increase his eating and p.o. intake of fluids.  Patient also understands that he needs to quarantine.    Therefore, he is discharged in good and stable condition to home with close outpatient follow-up.    The patient recovered much more quickly than anticipated on admission.    Discharge Date  8/20/2021    FOLLOW UP ITEMS POST DISCHARGE    Covid pneumonia      DISCHARGE DIAGNOSES  Principal Problem:    Syncope and collapse POA: Yes  Active Problems:    Essential hypertension POA: Yes    Controlled type 2 diabetes mellitus, with long-term current use of insulin (HCC) POA: Yes    Hypoglycemia POA: Yes    COVID-19 POA: Yes    Sepsis (HCC) POA: Yes    Elevated troponin POA: Yes    Hyponatremia POA: Yes    Class 1 obesity due to excess calories with serious comorbidity and body mass index (BMI) of 30.0 to 30.9 in adult POA: Unknown  Resolved Problems:    * No resolved hospital problems. *      FOLLOW UP  No future appointments.  Florinda Pascual M.D.  [address] [facility] [id number] [phone]      Please call your primary care office to schedule a hospital follow up appointment. Thank you.      MEDICATIONS ON DISCHARGE     Medication List      START taking these medications      Instructions   benzonatate 100 MG Caps  Commonly known as: TESSALON   Take 1 Capsule by mouth 3 times a day as needed for Cough.  Dose: 100 mg     dexamethasone 0.5 MG Tabs  Commonly known as: DECADRON   Take 12 Tablets by mouth every day for 10 days.  Dose: 6 mg        CONTINUE taking these  medications      Instructions   aspirin 81 MG EC tablet   Take 1 tablet by mouth every day.  Dose: 81 mg     atorvastatin 80 MG tablet  Commonly known as: LIPITOR   Take 1 tablet by mouth every evening.  Dose: 80 mg     carvedilol 6.25 MG Tabs  Commonly known as: COREG   Take 6.25 mg by mouth 2 times a day.  Dose: 6.25 mg     cholestyramine light 4 GM/DOSE powder  Commonly known as: PREVALITE   Take 4 g by mouth 2 times a day. Mix 1 scoop (4 g) in applesauce or juice and take twice daily.  Dose: 4 g     gabapentin 100 MG Caps  Commonly known as: NEURONTIN   Take 100 mg by mouth at bedtime.  Dose: 100 mg     glimepiride 4 MG Tabs  Commonly known as: AMARYL   Take 1 tablet by mouth every day.  Dose: 4 mg     ibuprofen 200 MG Tabs  Commonly known as: MOTRIN   Take 400 mg by mouth every 6 hours as needed for Mild Pain.  Dose: 400 mg     Lantus 100 UNIT/ML Soln  Generic drug: insulin glargine   Inject 10 Units under the skin every evening.  Dose: 10 Units     lisinopril 10 MG Tabs  Commonly known as: PRINIVIL   Take 1 tablet by mouth every day.  Dose: 10 mg     LUCENTIS IZ   1 Dose by Intravitreal route every 8 weeks.  Dose: 1 Dose     omeprazole 20 MG delayed-release capsule  Commonly known as: PRILOSEC   Take 20 mg by mouth every morning.  Dose: 20 mg     Trulicity 1.5 MG/0.5ML Sopn  Generic drug: Dulaglutide   Inject 0.5 mL under the skin every 7 days for 90 days.  Dose: 0.5 mL            Allergies  No Known Allergies    DIET  Orders Placed This Encounter   Procedures   • Diet Order Diet: Consistent CHO (Diabetic); Second Modifier: (optional): Cardiac     Standing Status:   Standing     Number of Occurrences:   1     Order Specific Question:   Diet:     Answer:   Consistent CHO (Diabetic) [4]     Order Specific Question:   Second Modifier: (optional)     Answer:   Cardiac [6]       ACTIVITY  As tolerated.  Weight bearing as tolerated    CONSULTATIONS  None    PROCEDURES  None    LABORATORY  Lab Results   Component  Value Date    SODIUM 132 (L) 08/20/2021    POTASSIUM 4.4 08/20/2021    CHLORIDE 102 08/20/2021    CO2 19 (L) 08/20/2021    GLUCOSE 73 08/20/2021    BUN 15 08/20/2021    CREATININE 1.26 08/20/2021        Lab Results   Component Value Date    WBC 3.7 (L) 08/20/2021    HEMOGLOBIN 11.5 (L) 08/20/2021    HEMATOCRIT 33.0 (L) 08/20/2021    PLATELETCT 128 (L) 08/20/2021      Instructions:  The patient was instructed to return to the ER in the event of worsening symptoms. I have counseled the patient on the importance of compliance and the patient has agreed to proceed with all medical recommendations and follow up plan indicated above.   The patient understands that all medications come with benefits and risks. Risks may include permanent injury or death and these risks can be minimized with close reassessment and monitoring.    Total time of the discharge process exceeds 35 minutes.

## 2021-08-20 NOTE — PROGRESS NOTES
Vinita from Lab called with critical result of possible staph, not staph aureus in one bottle each from two separate draws at 1538. Critical lab result read back to Vinita.   Dr. Burrell notified of critical lab result at 1547.  Critical lab result read back by Dr. Burrell at 1547.    Rand from Lab called with critical result of gram positive cocci in clusters at 1539. Critical lab result read back to Rand.   Dr. Burrell notified of critical lab result at 1547.  Critical lab result read back by Dr. Burrell at 1547.  Gram positive cocci in clusters rand

## 2021-08-20 NOTE — PROGRESS NOTES
Received bedside report from JULIANO Bianchi, pt care assumed. Cardiac monitor applied. Monitor room notified.

## 2021-08-20 NOTE — PROGRESS NOTES
Received report from NOC RN. Assumed care of patient at 0700. Patient A&Ox 4, speaking in full sentences, follows commands and responds appropriately to questions. On 1L NC, no signs of respiratory distress. Respirations are even and unlabored. Patient states no pain at this time. POC discussed and agreed upon with patient. Call light and belongings within reach. Bed in lowest locked position. Upper side rails raised. Fall risk precautions in place. Hourly rounding. Will continue to monitor blood sugar and respiratory status.

## 2021-08-21 VITALS
RESPIRATION RATE: 18 BRPM | TEMPERATURE: 98.6 F | SYSTOLIC BLOOD PRESSURE: 134 MMHG | WEIGHT: 224.43 LBS | DIASTOLIC BLOOD PRESSURE: 76 MMHG | HEIGHT: 72 IN | HEART RATE: 88 BPM | OXYGEN SATURATION: 94 % | BODY MASS INDEX: 30.4 KG/M2

## 2021-08-21 LAB
ANION GAP SERPL CALC-SCNC: 11 MMOL/L (ref 7–16)
APPEARANCE UR: CLEAR
BACTERIA #/AREA URNS HPF: NEGATIVE /HPF
BASOPHILS # BLD AUTO: 0.4 % (ref 0–1.8)
BASOPHILS # BLD: 0.02 K/UL (ref 0–0.12)
BILIRUB UR QL STRIP.AUTO: NEGATIVE
BUN SERPL-MCNC: 15 MG/DL (ref 8–22)
CALCIUM SERPL-MCNC: 7.6 MG/DL (ref 8.5–10.5)
CHLORIDE SERPL-SCNC: 98 MMOL/L (ref 96–112)
CO2 SERPL-SCNC: 19 MMOL/L (ref 20–33)
COLOR UR: YELLOW
CREAT SERPL-MCNC: 1.29 MG/DL (ref 0.5–1.4)
EOSINOPHIL # BLD AUTO: 0 K/UL (ref 0–0.51)
EOSINOPHIL NFR BLD: 0 % (ref 0–6.9)
EPI CELLS #/AREA URNS HPF: NEGATIVE /HPF
ERYTHROCYTE [DISTWIDTH] IN BLOOD BY AUTOMATED COUNT: 40.4 FL (ref 35.9–50)
GLUCOSE BLD-MCNC: 94 MG/DL (ref 65–99)
GLUCOSE SERPL-MCNC: 97 MG/DL (ref 65–99)
GLUCOSE UR STRIP.AUTO-MCNC: NEGATIVE MG/DL
HCT VFR BLD AUTO: 32.5 % (ref 42–52)
HGB BLD-MCNC: 11.2 G/DL (ref 14–18)
IMM GRANULOCYTES # BLD AUTO: 0.04 K/UL (ref 0–0.11)
IMM GRANULOCYTES NFR BLD AUTO: 0.8 % (ref 0–0.9)
KETONES UR STRIP.AUTO-MCNC: NEGATIVE MG/DL
LEUKOCYTE ESTERASE UR QL STRIP.AUTO: NEGATIVE
LYMPHOCYTES # BLD AUTO: 1.66 K/UL (ref 1–4.8)
LYMPHOCYTES NFR BLD: 32.5 % (ref 22–41)
MAGNESIUM SERPL-MCNC: 2.4 MG/DL (ref 1.5–2.5)
MCH RBC QN AUTO: 29.9 PG (ref 27–33)
MCHC RBC AUTO-ENTMCNC: 34.5 G/DL (ref 33.7–35.3)
MCV RBC AUTO: 86.7 FL (ref 81.4–97.8)
MICRO URNS: ABNORMAL
MONOCYTES # BLD AUTO: 0.56 K/UL (ref 0–0.85)
MONOCYTES NFR BLD AUTO: 11 % (ref 0–13.4)
NEUTROPHILS # BLD AUTO: 2.82 K/UL (ref 1.82–7.42)
NEUTROPHILS NFR BLD: 55.3 % (ref 44–72)
NITRITE UR QL STRIP.AUTO: NEGATIVE
NRBC # BLD AUTO: 0 K/UL
NRBC BLD-RTO: 0 /100 WBC
PH UR STRIP.AUTO: 5 [PH] (ref 5–8)
PLATELET # BLD AUTO: 146 K/UL (ref 164–446)
PMV BLD AUTO: 12.1 FL (ref 9–12.9)
POTASSIUM SERPL-SCNC: 4.1 MMOL/L (ref 3.6–5.5)
PROT UR QL STRIP: 300 MG/DL
RBC # BLD AUTO: 3.75 M/UL (ref 4.7–6.1)
RBC # URNS HPF: ABNORMAL /HPF
RBC UR QL AUTO: ABNORMAL
SODIUM SERPL-SCNC: 128 MMOL/L (ref 135–145)
SP GR UR STRIP.AUTO: 1.01
UROBILINOGEN UR STRIP.AUTO-MCNC: 0.2 MG/DL
WBC # BLD AUTO: 5.1 K/UL (ref 4.8–10.8)
WBC #/AREA URNS HPF: ABNORMAL /HPF

## 2021-08-21 PROCEDURE — 36415 COLL VENOUS BLD VENIPUNCTURE: CPT

## 2021-08-21 PROCEDURE — 85025 COMPLETE CBC W/AUTO DIFF WBC: CPT

## 2021-08-21 PROCEDURE — A9270 NON-COVERED ITEM OR SERVICE: HCPCS | Performed by: STUDENT IN AN ORGANIZED HEALTH CARE EDUCATION/TRAINING PROGRAM

## 2021-08-21 PROCEDURE — 83735 ASSAY OF MAGNESIUM: CPT

## 2021-08-21 PROCEDURE — 96366 THER/PROPH/DIAG IV INF ADDON: CPT

## 2021-08-21 PROCEDURE — 700111 HCHG RX REV CODE 636 W/ 250 OVERRIDE (IP): Performed by: STUDENT IN AN ORGANIZED HEALTH CARE EDUCATION/TRAINING PROGRAM

## 2021-08-21 PROCEDURE — 94640 AIRWAY INHALATION TREATMENT: CPT

## 2021-08-21 PROCEDURE — G0378 HOSPITAL OBSERVATION PER HR: HCPCS

## 2021-08-21 PROCEDURE — 700102 HCHG RX REV CODE 250 W/ 637 OVERRIDE(OP): Performed by: STUDENT IN AN ORGANIZED HEALTH CARE EDUCATION/TRAINING PROGRAM

## 2021-08-21 PROCEDURE — 80048 BASIC METABOLIC PNL TOTAL CA: CPT

## 2021-08-21 PROCEDURE — 99217 PR OBSERVATION CARE DISCHARGE: CPT | Performed by: STUDENT IN AN ORGANIZED HEALTH CARE EDUCATION/TRAINING PROGRAM

## 2021-08-21 PROCEDURE — 81001 URINALYSIS AUTO W/SCOPE: CPT

## 2021-08-21 PROCEDURE — 94760 N-INVAS EAR/PLS OXIMETRY 1: CPT

## 2021-08-21 PROCEDURE — 82962 GLUCOSE BLOOD TEST: CPT

## 2021-08-21 RX ADMIN — ALBUTEROL SULFATE 2 PUFF: 90 AEROSOL, METERED RESPIRATORY (INHALATION) at 10:17

## 2021-08-21 RX ADMIN — OMEPRAZOLE 20 MG: 20 CAPSULE, DELAYED RELEASE ORAL at 05:24

## 2021-08-21 RX ADMIN — ALBUTEROL SULFATE 2 PUFF: 90 AEROSOL, METERED RESPIRATORY (INHALATION) at 07:59

## 2021-08-21 RX ADMIN — ASPIRIN 81 MG: 81 TABLET, COATED ORAL at 05:24

## 2021-08-21 RX ADMIN — CHOLESTYRAMINE 4 G: 4 POWDER, FOR SUSPENSION ORAL at 07:35

## 2021-08-21 RX ADMIN — CARVEDILOL 6.25 MG: 6.25 TABLET, FILM COATED ORAL at 05:24

## 2021-08-21 RX ADMIN — CEFAZOLIN SODIUM 2 G: 2 INJECTION, SOLUTION INTRAVENOUS at 05:24

## 2021-08-21 ASSESSMENT — ENCOUNTER SYMPTOMS
CONSTIPATION: 0
FEVER: 1
COUGH: 1
VOMITING: 0
ABDOMINAL PAIN: 0
SHORTNESS OF BREATH: 0
NAUSEA: 0
DIARRHEA: 0

## 2021-08-21 ASSESSMENT — PAIN DESCRIPTION - PAIN TYPE: TYPE: ACUTE PAIN

## 2021-08-21 NOTE — PROGRESS NOTES
Bedside report received from day nurse. Assumed care of patient. Pt. resting comfortably without any sign of distress. A&Ox4, VSS, no complaints at this time. POC reviewed and white board updated, Tele box on, Call light in reach, Bed locked in lowest position.

## 2021-08-21 NOTE — PROGRESS NOTES
[facility] Medicine Daily Progress Note    Date of Service  8/21/2021    Chief Complaint  Beni Moore is a 50 y.o. male admitted 8/19/2021 with COVID-19    Hospital Course  Beni Moore is a 50 y.o. male with PMH of diabetes, hypertension, and recent diagnosis of COVID-19 9 days ago.  Who presented 8/19/2021 with syncopal episodes.  Patient reports that for the past 4 days he has had several instances of syncope.  He reports that he gets up and is walking around the house and after a few seconds he just passes out and falls of the ground.  Denies any bowel or bladder incontinence.  He denies any prior history of syncope or seizures in the past.  He does report that he was diagnosed with COVID-19 9 days ago and he thought he was recovering well from it.  Continues to have some fevers and mild shortness of breath as well as nonproductive cough, but overall does not feel horribly.  He additionally does admit to having incidences of hypoglycemia into the 60s over the past few days.  However, patient is adamant that the incidences of syncope occured prior to him having issues of hypoglycemia.  Patient again had a syncopal episode today and EMS was called.  On EMS arrival, patient was reported to have a BS of 50 and he was given 15 g of glucose with improvement of his sugars to 89.  He was also given 900 cc of NS as he was found to have a positive orthostatics.     ED: Temperature 100.8-> 98.8, -97, //70.  WBC 3.0, Hb 11.5, platelets 122, , CO2 19, glucose 52, lactic acid 0.8, troponin 41, Covid positive.  CXR was performed with hypoinflation.  CTA of the chest was negative for PE, but did see opacifications concerning for Covid pneumonia.  CT of the head was unremarkable.  Patient was given D50, and Tylenol 650 mg.    Interval Problem Update  Beni Moore is a 50 y.o. male with PMH of diabetes, hypertension, and recent diagnosis of COVID-19 9 days ago.  Who presented 8/19/2021  with syncopal episodes.  Patient reports that for the past 4 days he has had several instances of syncope.  He reports that he gets up and is walking around the house and after a few seconds he just passes out and falls of the ground.  Denies any bowel or bladder incontinence.  He denies any prior history of syncope or seizures in the past.  He does report that he was diagnosed with COVID-19 9 days ago and he thought he was recovering well from it.  Continues to have some fevers and mild shortness of breath as well as nonproductive cough, but overall does not feel horribly.  He additionally does admit to having incidences of hypoglycemia into the 60s over the past few days.  However, patient is adamant that the incidences of syncope occured prior to him having issues of hypoglycemia.  Patient again had a syncopal episode today and EMS was called.  On EMS arrival, patient was reported to have a BS of 50 and he was given 15 g of glucose with improvement of his sugars to 89.  He was also given 900 cc of NS as he was found to have a positive orthostatics.     ED: Temperature 100.8-> 98.8, -97, //70.  WBC 3.0, Hb 11.5, platelets 122, , CO2 19, glucose 52, lactic acid 0.8, troponin 41, Covid positive.  CXR was performed with hypoinflation.  CTA of the chest was negative for PE, but did see opacifications concerning for Covid pneumonia.  CT of the head was unremarkable.  Patient was given D50, and Tylenol 650 mg.     8/20: Patient seen examined at bedside.  Currently off of oxygen ambulating and while sitting in bed greater than 90%.  Patient feeling better.  Will discharge on Decadron.  Patient had been hyperglycemic secondary to poor oral intake.  Patient understands how to adjust his insulin at home.  Also discussed with him that he needs to increase his eating and p.o. intake of fluids.  Patient also understands that he needs to quarantine. Blood cultures returned +2 of 2 for staph epi  concerning for contaminant. Repeat cultures ordered and procalcitonin ordered. At this time recommend holding off antibiotics and monitoring.    I have personally seen and examined the patient at bedside. I discussed the plan of care with patient and bedside RN.    Consultants/Specialty  none    Code Status  Prior    Disposition  Patient is medically cleared.   Anticipate discharge to to home with close outpatient follow-up.  I have placed the appropriate orders for post-discharge needs.    Review of Systems  Review of Systems   Constitutional: Positive for fever.   Respiratory: Positive for cough. Negative for shortness of breath.    Cardiovascular: Negative for chest pain.   Gastrointestinal: Negative for abdominal pain, constipation, diarrhea, nausea and vomiting.   Genitourinary: Negative for dysuria.   All other systems reviewed and are negative.       Physical Exam  Temp:  [36.7 °C (98 °F)-37.6 °C (99.6 °F)] 37 °C (98.6 °F)  Pulse:  [85-95] 88  Resp:  [16-18] 18  BP: (115-134)/(69-81) 134/76  SpO2:  [92 %-95 %] 94 %    Physical Exam  Vitals and nursing note reviewed.   Constitutional:       General: He is not in acute distress.     Appearance: Normal appearance. He is not ill-appearing.   HENT:      Head: Normocephalic and atraumatic.   Eyes:      General: No scleral icterus.        Right eye: No discharge.         Left eye: No discharge.   Cardiovascular:      Rate and Rhythm: Normal rate and regular rhythm.      Heart sounds: Normal heart sounds. No murmur heard.     Pulmonary:      Effort: No respiratory distress.      Breath sounds: Rhonchi present. No wheezing.      Comments: Upper respiratory breath sounds  Abdominal:      General: Abdomen is flat. Bowel sounds are normal. There is no distension.      Tenderness: There is no abdominal tenderness. There is no guarding.   Musculoskeletal:         General: No tenderness.      Right lower leg: No edema.      Left lower leg: No edema.   Skin:     General:  Skin is warm and dry.      Coloration: Skin is not jaundiced.   Neurological:      Mental Status: He is alert and oriented to person, place, and time.      Cranial Nerves: No cranial nerve deficit.   Psychiatric:         Mood and Affect: Mood normal.         Thought Content: Thought content normal.         Judgment: Judgment normal.         Fluids    Intake/Output Summary (Last 24 hours) at 8/21/2021 1303  Last data filed at 8/21/2021 0900  Gross per 24 hour   Intake --   Output 1680 ml   Net -1680 ml       Laboratory  Recent Labs     08/20/21  0235 08/20/21  0335 08/21/21  0403   WBC 3.5* 3.7* 5.1   RBC 3.81* 3.82* 3.75*   HEMOGLOBIN 11.6* 11.5* 11.2*   HEMATOCRIT 33.2* 33.0* 32.5*   MCV 87.1 86.4 86.7   MCH 30.4 30.1 29.9   MCHC 34.9 34.8 34.5   RDW 40.9 40.4 40.4   PLATELETCT 117* 128* 146*   MPV 11.9 11.6 12.1     Recent Labs     08/20/21  0235 08/20/21  0335 08/21/21  0403   SODIUM 132* 132* 128*   POTASSIUM 4.1 4.4 4.1   CHLORIDE 102 102 98   CO2 19* 19* 19*   GLUCOSE 72 73 97   BUN 16 15 15   CREATININE 1.27 1.26 1.29   CALCIUM 7.6* 7.7* 7.6*                   Imaging  EC-ECHOCARDIOGRAM LTD W/O CONT   Final Result      CT-HEAD W/O   Final Result         NO ACUTE ABNORMALITIES ARE NOTED ON CT SCAN OF THE HEAD.      Possible sinusitis.      CT-CTA CHEST PULMONARY ARTERY W/ RECONS   Final Result      1.  No CT evidence of pulmonary embolism.      2.  Pulmonary opacifications are identified which are consistent with Covid 19 pneumonia.      3.  Cholelithiasis.            DX-CHEST-PORTABLE (1 VIEW)   Final Result      Hypoinflation without other evidence for acute cardiopulmonary disease.           Assessment/Plan  * Syncope and collapse- (present on admission)  Assessment & Plan  Multiple episodes of syncope the past 4 days  Patient noted to be hypoglycemic, but reports that syncope occurred without incidence of hypoglycemia  No prior history of syncope or change in medications  CT imaging unremarkable  -  Telemetry monitoring  - Fall precautions  - Check orthostatics  - TTE      Class 1 obesity due to excess calories with serious comorbidity and body mass index (BMI) of 30.0 to 30.9 in adult  Assessment & Plan  Diet, exercise and weight loss    Hyponatremia- (present on admission)  Assessment & Plan  Received 900 cc of NS from EMS  -Continue to monitor and replete IV fluids as needed    Elevated troponin- (present on admission)  Assessment & Plan  Trend troponin  telemetry monitoring  echocardiogram    Sepsis (HCC)- (present on admission)  Assessment & Plan  This is Sepsis Present on admission  SIRS criteria identified on my evaluation include: Tachycardia, with heart rate greater than 90 BPM and Leukopenia, with WBC less than 4,000  Source is COVID-19  Sepsis protocol initiated  Fluid resuscitation ordered per protocol  IV antibiotics as appropriate for source of sepsis  While organ dysfunction may be noted elsewhere in this problem list or in the chart, degree of organ dysfunction does not meet CMS criteria for severe sepsis          COVID-19- (present on admission)  Assessment & Plan  Patient diagnosed with COVID 9 days prior  Confirmed on today's repeat testing  Noted to have fevers in the ED, maintain saturations on room air  - Conservative use of IVF  - Albuterol, O2 therapy, and proning as needed  - Tessalon Perles for Sx management  - Holding lasix as no obvious signs of volume overload.  - holding Decadron 6mg qd as not hypoxic  - Lovenox for VTE ppx        Hypoglycemia- (present on admission)  Assessment & Plan  Serial glucose checks  holding home diabetes medications  ISS for coverage  hypoglycemia protocol    Controlled type 2 diabetes mellitus, with long-term current use of insulin ([facility])- (present on admission)  Assessment & Plan  Noted to have into his hypoglycemia past few days  glucose at home was 67  glucose 52 in ED  s/p D50 x1  -holding home glimepiride 4 mg and Lantus 10 units nightly.  -Patient  also noted to be on Trulicity which he gets q. weekly  -we'll initiate ISS  -serial point-of-care glucose testing  -hypoglycemia protocol, will consider glucose infusion if hypoglycemia persists    Essential hypertension- (present on admission)  Assessment & Plan  Continue with home carvedilol 6.25 mg twice daily, and lisinopril 10 mg daily         VTE prophylaxis: enoxaparin ppx    I have performed a physical exam and reviewed and updated ROS and Plan today (8/21/2021). In review of yesterday's note (8/20/2021), there are no changes except as documented above.

## 2021-08-21 NOTE — PROGRESS NOTES
Patient discharged to home via car with relative. Discharge paperwork was discussed with the patient. LDAs were removed. Tele monitor disconnected. Pt prescriptions reviewed. Pt home meds returned to patient from pharmacy. Patient escorted out in wheelchair by RN.

## 2021-08-21 NOTE — DISCHARGE SUMMARY
Discharge Summary    CHIEF COMPLAINT ON ADMISSION  Chief Complaint   Patient presents with   • Syncope     BIB EMS from home. pt is covid positive. past 2-4 days pt has been passing out every time he stand up. today at 0930 pt passed out and hit head. negative LOC, pt not on blood thinners.    • Fever   • Chills   • Weakness     generalized       Reason for Admission  EMS     Admission Date  8/19/2021    CODE STATUS  Full Code    HPI & HOSPITAL COURSE  Beni Moore is a 50 y.o. male with PMH of diabetes, hypertension, and recent diagnosis of COVID-19 9 days ago.  Who presented 8/19/2021 with syncopal episodes.  Patient reports that for the past 4 days he has had several instances of syncope.  He reports that he gets up and is walking around the house and after a few seconds he just passes out and falls of the ground.  Denies any bowel or bladder incontinence.  He denies any prior history of syncope or seizures in the past.  He does report that he was diagnosed with COVID-19 9 days ago and he thought he was recovering well from it.  Continues to have some fevers and mild shortness of breath as well as nonproductive cough, but overall does not feel horribly.  He additionally does admit to having incidences of hypoglycemia into the 60s over the past few days.  However, patient is adamant that the incidences of syncope occured prior to him having issues of hypoglycemia.  Patient again had a syncopal episode today and EMS was called.  On EMS arrival, patient was reported to have a BS of 50 and he was given 15 g of glucose with improvement of his sugars to 89.  He was also given 900 cc of NS as he was found to have a positive orthostatics.     ED: Temperature 100.8-> 98.8, -97, //70.  WBC 3.0, Hb 11.5, platelets 122, , CO2 19, glucose 52, lactic acid 0.8, troponin 41, Covid positive.  CXR was performed with hypoinflation.  CTA of the chest was negative for PE, but did see opacifications  concerning for Covid pneumonia.  CT of the head was unremarkable.  Patient was given D50, and Tylenol 650 mg.    8/20: Patient seen examined at bedside.  Currently off of oxygen ambulating and while sitting in bed greater than 90%.  Patient feeling better.  Will discharge on Decadron.  Patient had been hyperglycemic secondary to poor oral intake.  Patient understands how to adjust his insulin at home.  Also discussed with him that he needs to increase his eating and p.o. intake of fluids.  Patient also understands that he needs to quarantine.    8/21: Patient was seen examined at bedside. Yesterday patient blood culture returned growing staph epidermidis. Patient has no obvious signs of infection there are no skin breakdown lesions or history of IV drug use. Procalcitonin was normal. Repeat blood cultures ordered yesterday are negative to date. Patient will discharge home. Option to stay in the hospital discussed with the patient patient is opted to discharge home with blood culture monitoring. Patient will be monitored off of antibiotics since this is likely a contaminant. Patient is clinically stable and can be discharged home.    Therefore, he is discharged in good and stable condition to home with close outpatient follow-up.    The patient recovered much more quickly than anticipated on admission.    Discharge Date  8/21/2021    FOLLOW UP ITEMS POST DISCHARGE    Covid pneumonia      DISCHARGE DIAGNOSES  Principal Problem:    Syncope and collapse POA: Yes  Active Problems:    Essential hypertension POA: Yes    Controlled type 2 diabetes mellitus, with long-term current use of insulin (HCC) POA: Yes    Hypoglycemia POA: Yes    COVID-19 POA: Yes    Sepsis (HCC) POA: Yes    Elevated troponin POA: Yes    Hyponatremia POA: Yes    Class 1 obesity due to excess calories with serious comorbidity and body mass index (BMI) of 30.0 to 30.9 in adult POA: Unknown  Resolved Problems:    * No resolved hospital problems.  *      FOLLOW UP  No future appointments.  Florinda Pascual M.D.  [address]  [facility] [id number] [phone]      Please call your primary care office to schedule a hospital follow up appointment. Thank you.      MEDICATIONS ON DISCHARGE     Medication List      START taking these medications      Instructions   benzonatate 100 MG Caps  Commonly known as: TESSALON   Take 1 Capsule by mouth 3 times a day as needed for Cough.  Dose: 100 mg     dexamethasone 0.5 MG Tabs  Commonly known as: DECADRON   Take 12 Tablets by mouth every day for 10 days.  Dose: 6 mg        CONTINUE taking these medications      Instructions   aspirin 81 MG EC tablet   Take 1 tablet by mouth every day.  Dose: 81 mg     atorvastatin 80 MG tablet  Commonly known as: LIPITOR   Take 1 tablet by mouth every evening.  Dose: 80 mg     carvedilol 6.25 MG Tabs  Commonly known as: COREG   Take 6.25 mg by mouth 2 times a day.  Dose: 6.25 mg     cholestyramine light 4 GM/DOSE powder  Commonly known as: PREVALITE   Take 4 g by mouth 2 times a day. Mix 1 scoop (4 g) in applesauce or juice and take twice daily.  Dose: 4 g     gabapentin 100 MG Caps  Commonly known as: NEURONTIN   Take 100 mg by mouth at bedtime.  Dose: 100 mg     glimepiride 4 MG Tabs  Commonly known as: AMARYL   Take 1 tablet by mouth every day.  Dose: 4 mg     ibuprofen 200 MG Tabs  Commonly known as: MOTRIN   Take 400 mg by mouth every 6 hours as needed for Mild Pain.  Dose: 400 mg     Lantus 100 UNIT/ML Soln  Generic drug: insulin glargine   Inject 10 Units under the skin every evening.  Dose: 10 Units     lisinopril 10 MG Tabs  Commonly known as: PRINIVIL   Take 1 tablet by mouth every day.  Dose: 10 mg     LUCENTIS IZ   1 Dose by Intravitreal route every 8 weeks.  Dose: 1 Dose     omeprazole 20 MG delayed-release capsule  Commonly known as: PRILOSEC   Take 20 mg by mouth every morning.  Dose: 20 mg     Trulicity 1.5 MG/0.5ML Sopn  Generic drug: Dulaglutide   Inject 0.5 mL  under the skin every 7 days for 90 days.  Dose: 0.5 mL            Allergies  No Known Allergies    DIET  Orders Placed This Encounter   Procedures   • Diet Order Diet: Consistent CHO (Diabetic); Second Modifier: (optional): Cardiac     Standing Status:   Standing     Number of Occurrences:   1     Order Specific Question:   Diet:     Answer:   Consistent CHO (Diabetic) [4]     Order Specific Question:   Second Modifier: (optional)     Answer:   Cardiac [6]       ACTIVITY  As tolerated.  Weight bearing as tolerated    CONSULTATIONS  None    PROCEDURES  None    LABORATORY  Lab Results   Component Value Date    SODIUM 128 (L) 08/21/2021    POTASSIUM 4.1 08/21/2021    CHLORIDE 98 08/21/2021    CO2 19 (L) 08/21/2021    GLUCOSE 97 08/21/2021    BUN 15 08/21/2021    CREATININE 1.29 08/21/2021        Lab Results   Component Value Date    WBC 5.1 08/21/2021    HEMOGLOBIN 11.2 (L) 08/21/2021    HEMATOCRIT 32.5 (L) 08/21/2021    PLATELETCT 146 (L) 08/21/2021      Instructions:  The patient was instructed to return to the ER in the event of worsening symptoms. I have counseled the patient on the importance of compliance and the patient has agreed to proceed with all medical recommendations and follow up plan indicated above.   The patient understands that all medications come with benefits and risks. Risks may include permanent injury or death and these risks can be minimized with close reassessment and monitoring.    Total time of the discharge process exceeds 35 minutes.

## 2021-08-21 NOTE — CARE PLAN
The patient is Watcher - Medium risk of patient condition declining or worsening    Shift Goals  Clinical Goals: glucose and oxygenation monitoring        Problem: Hemodynamics  Goal: Patient's hemodynamics, fluid balance and neurologic status will be stable or improve  Outcome: Progressing   Pt received positive blood cultures, no antibiotics started. Contacted MD who placed orders. Monitor blood sugars, has been hypoglycemic.     Problem: Respiratory  Goal: Patient will achieve/maintain optimum respiratory ventilation and gas exchange  Outcome: Progressing     Problem: Psychosocial  Goal: Patient's level of anxiety will decrease  Outcome: Progressing     Problem: Communication  Goal: The ability to communicate needs accurately and effectively will improve  Outcome: Progressing

## 2021-08-21 NOTE — PROGRESS NOTES
Overnight Hospitalist Note    Called by bedside nursing for medications for nasal congestion and insomnia.  Also asking for clarification regarding further labs given positive blood cultures x2 with Staphylococcus species likely not MRSA.    In summary, this is a gentleman with a history of diabetes and hypertension diagnosed with COVID-19 approximately 9 days ago who presented with syncope.  Head CT was negative for intracranial pathology.  It did show possible sinusitis.  Patient was found to be septic with fever of 102.1 and heart rate of 100.  White count low at 3 on presentation meeting criteria for sepsis.  It was presumed that the sepsis was secondary to his COVID-19 infection and the patient was not started on sepsis protocol or antibiotics.  Patient improved clinically overnight was able to wean off oxygen during ambulation.  Echocardiogram showed EF of 65% with no significant valvular abnormalities.  He had discharge orders in place.  However, blood cultures came back to 2/2 bottles positive for gram-positive species likely Staphylococcus.  Blood cultures and procalcitonin level were redrawn by the daytime hospitalist and patient was continued with his hospitalization.      CTA with PE protocol was performed, which was negative for pulmonary embolism.  Per my read, the scan above showed interstitial opacities particularly in the posterior lobes bilaterally more prominent on the right greater than the left.  No pleural effusions.        I have personally reviewed the patient's head CT.  Per my read there is no intracranial hemorrhages or masses.  No hydrocephalus.  He does have thickening mucosa within the nasal airways and possibly maxillary sinuses.  Sphenoid sinuses show no mucosal thickening per my read.    Assessment and plan:  #Sepsis, source lung versus sinuses  #Bacteremia likely MSSA  #Sinusitis  #Insomnia  #Diabetes mellitus type 2  #COVID-19 pneumonia    Appears that the patient has been  improving clinically even without receiving antibiotics.  However, given 2 out of 2 bottles with early positive cultures, this is unlikely a contaminant and reflects true bacteremia.  Therefore, I do believe antibiotics are indicated.  Procalcitonin level is still pending at this time.  Since patient is on room air, dexamethasone is not indicated at this time.    -Start cefazolin.  May consider transition to Keflex with clinical stability and finalization of blood cultures.  -Start sepsis order set protocol with fluids only as needed given COVID-19 infection and clinical stability.  -Melatonin for insomnia.   -Fluticasone for nasal congestion.    Prolonged inpatient without face-to-face: I have spent a total of 32 minutes reviewing patient's clinical history, presentation, laboratory values, vital signs, cultures, and medical treatments.  Thorough review of his history was necessary to consider initiation of antibiotics and treatment for sepsis in setting of COVID-19 infection and diabetes mellitus type 2.  Patient's medical history is complex.  Start time 8:51 PM.  End time  9:23 PM.

## 2021-08-21 NOTE — PROGRESS NOTES
With patient’s permission completed daily phone call to designated support person, wife   Discussed patient condition and plan of care. All questions answered. Pt.'s wife requested her  contact her when he is able to using his cell phone. Will tell pt.

## 2021-08-22 LAB
BACTERIA UR CULT: NORMAL
SIGNIFICANT IND 70042: NORMAL
SITE SITE: NORMAL
SOURCE SOURCE: NORMAL

## 2021-08-23 ENCOUNTER — PATIENT OUTREACH (OUTPATIENT)
Dept: MEDICAL GROUP | Facility: PHYSICIAN GROUP | Age: 51
End: 2021-08-23

## 2021-08-23 LAB
BACTERIA BLD CULT: ABNORMAL
SIGNIFICANT IND 70042: ABNORMAL
SIGNIFICANT IND 70042: ABNORMAL
SITE SITE: ABNORMAL
SITE SITE: ABNORMAL
SOURCE SOURCE: ABNORMAL
SOURCE SOURCE: ABNORMAL

## 2021-08-23 NOTE — PROGRESS NOTES
Returned call fro outreach for TCM.  Reviewed discharge instructions and medications.  Stated that he was unable to get his decadron yesterday because the pharmacy did not have it.  Stated that it will be ready today and has someone to get his prescription.  Verbalized understanding.  Able to schedule a virtual appointment for 8/26 at 1pm.

## 2021-08-25 LAB
BACTERIA BLD CULT: NORMAL
BACTERIA BLD CULT: NORMAL
SIGNIFICANT IND 70042: NORMAL
SIGNIFICANT IND 70042: NORMAL
SITE SITE: NORMAL
SITE SITE: NORMAL
SOURCE SOURCE: NORMAL
SOURCE SOURCE: NORMAL

## 2021-08-25 NOTE — PROGRESS NOTES
"Community Health Worker Intake    • Social determinates of health intake completed.  • Identified barriers to: none.  • Contact information provided to Beni Moore   • Has PCP appointment scheduled for: 8/26 at 1:00 pm  • Outpatient assessment completed.  • Did the patient receive medications post discharge: Yes    CHW Rachid reached out to pt via TC to f/u after d/c and introduce CCM services. Pt stated he was doing \"okay\" and was able to get his medications from [facility]; no side-effects or questions so far. Pt did not express any food, housing or transportation barriers at this time. He is an active  and can take himself to appts when needed. Pt currently living with his parents and wife, who are all a good support to him, but will soon be moving into his own home with his wife after he recovers from COVID. He did not express needing any additional resources/services at this time.     Plan: d/c due to all current needs/goals met 8/25.   "

## 2021-08-26 ENCOUNTER — TELEMEDICINE (OUTPATIENT)
Dept: MEDICAL GROUP | Facility: PHYSICIAN GROUP | Age: 51
End: 2021-08-26
Payer: MEDICARE

## 2021-08-26 VITALS — BODY MASS INDEX: 29.12 KG/M2 | WEIGHT: 215 LBS | HEIGHT: 72 IN

## 2021-08-26 DIAGNOSIS — D64.9 ANEMIA, UNSPECIFIED TYPE: ICD-10-CM

## 2021-08-26 DIAGNOSIS — E87.1 HYPONATREMIA: ICD-10-CM

## 2021-08-26 PROCEDURE — 99214 OFFICE O/P EST MOD 30 MIN: CPT | Mod: 95 | Performed by: FAMILY MEDICINE

## 2021-08-26 ASSESSMENT — FIBROSIS 4 INDEX: FIB4 SCORE: 2.24

## 2021-08-26 NOTE — PROGRESS NOTES
Virtual Visit: Established Patient   This visit was conducted via Zoom using secure and encrypted videoconferencing technology.   The patient was in a private location in the state of [place].    The patient's identity was confirmed and verbal consent was obtained for this virtual visit.    Subjective:   CC:   Chief Complaint   Patient presents with   • Follow-Up       Beni Moore is a 50 y.o. male presenting for follow-up.  Patient was admitted to the hospital around August 7 for Covid.  Patient since is asymptomatic he got back his taste and smell with no problems.  Patient feels he is doing great he is not on oxygen at this time.  I did ask him about the infection to his heel he says is completely healed up and he has no problems at all and it was his right heel.  I did review his hospitalization labs        ROS       Current medicines (including changes today)  Current Outpatient Medications   Medication Sig Dispense Refill   • benzonatate (TESSALON) 100 MG Cap Take 1 Capsule by mouth 3 times a day as needed for Cough. 21 Capsule 0   • dexamethasone (DECADRON) 0.5 MG Tab Take 12 Tablets by mouth every day for 10 days. 120 Tablet 0   • carvedilol (COREG) 6.25 MG Tab Take 6.25 mg by mouth 2 times a day.     • gabapentin (NEURONTIN) 100 MG Cap Take 100 mg by mouth at bedtime.     • insulin glargine (LANTUS) 100 UNIT/ML Solution Inject 10 Units under the skin every evening.     • ibuprofen (MOTRIN) 200 MG Tab Take 400 mg by mouth every 6 hours as needed for Mild Pain.     • lisinopril (PRINIVIL) 10 MG Tab Take 1 tablet by mouth every day. 90 tablet 3   • cholestyramine light (PREVALITE) 4 GM/DOSE powder Take 4 g by mouth 2 times a day. Mix 1 scoop (4 g) in applesauce or juice and take twice daily.     • atorvastatin (LIPITOR) 80 MG tablet Take 1 tablet by mouth every evening. 90 tablet 3   • Dulaglutide (TRULICITY) 1.5 MG/0.5ML Solution Pen-injector Inject 0.5 mL under the skin every 7 days for 90 days. 13 Each 3    • aspirin 81 MG EC tablet Take 1 tablet by mouth every day. 90 tablet 0   • glimepiride (AMARYL) 4 MG Tab Take 1 tablet by mouth every day. 90 tablet 3   • Ranibizumab (LUCENTIS IZ) 1 Dose by Intravitreal route every 8 weeks.     • omeprazole (PRILOSEC) 20 MG delayed-release capsule Take 20 mg by mouth every morning.       No current facility-administered medications for this visit.       Patient Active Problem List    Diagnosis Date Noted   • Class 1 obesity due to excess calories with serious comorbidity and body mass index (BMI) of 30.0 to 30.9 in adult 08/20/2021   • Syncope and collapse 08/19/2021   • Hypoglycemia 08/19/2021   • COVID-19 08/19/2021   • Sepsis ([facility]) 08/19/2021   • Elevated troponin 08/19/2021   • Hyponatremia 08/19/2021   • Lower limb diabetic mononeuropathy ([facility]) 07/16/2021   • Controlled type 2 diabetes mellitus, with long-term current use of insulin ([facility]) 07/16/2021   • Partial thickness burn of right foot 07/02/2021   • Retinopathy 01/14/2020   • Conductive hearing loss, bilateral 01/14/2020   • Coronary artery disease involving native coronary artery of native heart without angina pectoris 10/02/2019   • Functional diarrhea 09/27/2019   • Essential hypertension 09/21/2019   • NSTEMI (non-ST elevated myocardial infarction) ([facility]) 09/21/2019   • AXEL (acute kidney injury) ([facility]) 09/21/2019   • S/P transmetatarsal amputation of foot, right ([facility]) 07/05/2018   • Normocytic anemia 06/30/2018   • Overweight (BMI 25.0-29.9) 06/30/2018        Objective:   Ht 1.829 m (6') Comment: Pt states  Wt 97.5 kg (215 lb) Comment: Pt states  BMI 29.16 kg/m²     Physical Exam:  Constitutional: Alert, no distress, well-groomed.  Skin: No rashes in visible areas.  Eye: Round. Conjunctiva clear, lids normal.   ENMT: Lips pink without lesions. Phonation normal.  Neck:  Moves freely without pain.  Respiratory: Unlabored respiratory effort, no cough or audible wheeze  Psych: Alert and oriented x3, normal affect and  mood.     Assessment and Plan:   The following treatment plan was discussed:     1. Hyponatremia  On review of labs done in the hospital his sodium was low we will go ahead and recheck this this is a acute problem  - Comp Metabolic Panel; Future    2. Anemia, unspecified type  During his hospitalization his hemoglobin was low we will go ahead and recheck this this is a acute problem  - CBC WITH DIFFERENTIAL; Future  3. Type 2 diabetes  Patient states his blood sugars are doing great during the 150s.  This is a chronic problem    Follow-up: Return in about 2 weeks (around 9/9/2021).

## 2021-08-30 ENCOUNTER — DOCUMENTATION (OUTPATIENT)
Dept: VASCULAR LAB | Facility: MEDICAL CENTER | Age: 51
End: 2021-08-30

## 2021-08-30 NOTE — PROGRESS NOTES
Called pt regarding missed new pt DM pharmacotherapy appt.     Rescheduled to 9/13/21 @ 3pm.    Kristin DuarteD

## 2021-08-31 ENCOUNTER — APPOINTMENT (OUTPATIENT)
Dept: RADIOLOGY | Facility: MEDICAL CENTER | Age: 51
End: 2021-08-31
Attending: EMERGENCY MEDICINE
Payer: MEDICARE

## 2021-08-31 ENCOUNTER — HOSPITAL ENCOUNTER (EMERGENCY)
Facility: MEDICAL CENTER | Age: 51
End: 2021-09-01
Attending: EMERGENCY MEDICINE
Payer: MEDICARE

## 2021-08-31 DIAGNOSIS — R73.9 HYPERGLYCEMIA: ICD-10-CM

## 2021-08-31 LAB
ALBUMIN SERPL BCP-MCNC: 3.4 G/DL (ref 3.2–4.9)
ALBUMIN/GLOB SERPL: 1.1 G/DL
ALP SERPL-CCNC: 61 U/L (ref 30–99)
ALT SERPL-CCNC: 43 U/L (ref 2–50)
ANION GAP SERPL CALC-SCNC: 12 MMOL/L (ref 7–16)
AST SERPL-CCNC: 28 U/L (ref 12–45)
B-OH-BUTYR SERPL-MCNC: 0.15 MMOL/L (ref 0.02–0.27)
BASE EXCESS BLDV CALC-SCNC: -5 MMOL/L
BASOPHILS # BLD AUTO: 0.1 % (ref 0–1.8)
BASOPHILS # BLD: 0.02 K/UL (ref 0–0.12)
BILIRUB SERPL-MCNC: 1.1 MG/DL (ref 0.1–1.5)
BODY TEMPERATURE: ABNORMAL CENTIGRADE
BUN SERPL-MCNC: 41 MG/DL (ref 8–22)
CALCIUM SERPL-MCNC: 8.6 MG/DL (ref 8.5–10.5)
CHLORIDE SERPL-SCNC: 96 MMOL/L (ref 96–112)
CO2 SERPL-SCNC: 18 MMOL/L (ref 20–33)
CREAT SERPL-MCNC: 1.68 MG/DL (ref 0.5–1.4)
EOSINOPHIL # BLD AUTO: 0 K/UL (ref 0–0.51)
EOSINOPHIL NFR BLD: 0 % (ref 0–6.9)
ERYTHROCYTE [DISTWIDTH] IN BLOOD BY AUTOMATED COUNT: 39.8 FL (ref 35.9–50)
GLOBULIN SER CALC-MCNC: 3.2 G/DL (ref 1.9–3.5)
GLUCOSE BLD-MCNC: 411 MG/DL (ref 65–99)
GLUCOSE SERPL-MCNC: 457 MG/DL (ref 65–99)
HCO3 BLDV-SCNC: 20 MMOL/L (ref 24–28)
HCT VFR BLD AUTO: 35.8 % (ref 42–52)
HGB BLD-MCNC: 12.5 G/DL (ref 14–18)
IMM GRANULOCYTES # BLD AUTO: 0.16 K/UL (ref 0–0.11)
IMM GRANULOCYTES NFR BLD AUTO: 1 % (ref 0–0.9)
LYMPHOCYTES # BLD AUTO: 2 K/UL (ref 1–4.8)
LYMPHOCYTES NFR BLD: 12.3 % (ref 22–41)
MCH RBC QN AUTO: 30 PG (ref 27–33)
MCHC RBC AUTO-ENTMCNC: 34.9 G/DL (ref 33.7–35.3)
MCV RBC AUTO: 85.9 FL (ref 81.4–97.8)
MONOCYTES # BLD AUTO: 0.86 K/UL (ref 0–0.85)
MONOCYTES NFR BLD AUTO: 5.3 % (ref 0–13.4)
NEUTROPHILS # BLD AUTO: 13.25 K/UL (ref 1.82–7.42)
NEUTROPHILS NFR BLD: 81.3 % (ref 44–72)
NRBC # BLD AUTO: 0 K/UL
NRBC BLD-RTO: 0 /100 WBC
PCO2 BLDV: 37.1 MMHG (ref 41–51)
PH BLDV: 7.35 [PH] (ref 7.31–7.45)
PLATELET # BLD AUTO: 444 K/UL (ref 164–446)
PMV BLD AUTO: 10.7 FL (ref 9–12.9)
PO2 BLDV: 23.4 MMHG (ref 25–40)
POTASSIUM SERPL-SCNC: 4.7 MMOL/L (ref 3.6–5.5)
PROT SERPL-MCNC: 6.6 G/DL (ref 6–8.2)
RBC # BLD AUTO: 4.17 M/UL (ref 4.7–6.1)
SAO2 % BLDV: 37.3 %
SODIUM SERPL-SCNC: 126 MMOL/L (ref 135–145)
WBC # BLD AUTO: 16.3 K/UL (ref 4.8–10.8)

## 2021-08-31 PROCEDURE — 93005 ELECTROCARDIOGRAM TRACING: CPT | Performed by: EMERGENCY MEDICINE

## 2021-08-31 PROCEDURE — 85025 COMPLETE CBC W/AUTO DIFF WBC: CPT

## 2021-08-31 PROCEDURE — 82803 BLOOD GASES ANY COMBINATION: CPT

## 2021-08-31 PROCEDURE — 96360 HYDRATION IV INFUSION INIT: CPT

## 2021-08-31 PROCEDURE — 36415 COLL VENOUS BLD VENIPUNCTURE: CPT

## 2021-08-31 PROCEDURE — 71045 X-RAY EXAM CHEST 1 VIEW: CPT

## 2021-08-31 PROCEDURE — 700105 HCHG RX REV CODE 258: Performed by: EMERGENCY MEDICINE

## 2021-08-31 PROCEDURE — 82010 KETONE BODYS QUAN: CPT

## 2021-08-31 PROCEDURE — 82962 GLUCOSE BLOOD TEST: CPT

## 2021-08-31 PROCEDURE — 80053 COMPREHEN METABOLIC PANEL: CPT

## 2021-08-31 PROCEDURE — 99284 EMERGENCY DEPT VISIT MOD MDM: CPT

## 2021-08-31 RX ORDER — DEXAMETHASONE 4 MG/1
6 TABLET ORAL DAILY
Status: SHIPPED | COMMUNITY
Start: 2021-08-24 | End: 2021-09-13

## 2021-08-31 RX ORDER — SODIUM CHLORIDE 9 MG/ML
1000 INJECTION, SOLUTION INTRAVENOUS ONCE
Status: COMPLETED | OUTPATIENT
Start: 2021-08-31 | End: 2021-08-31

## 2021-08-31 RX ADMIN — SODIUM CHLORIDE 1000 ML: 9 INJECTION, SOLUTION INTRAVENOUS at 21:51

## 2021-08-31 ASSESSMENT — FIBROSIS 4 INDEX: FIB4 SCORE: 2.28

## 2021-09-01 VITALS
OXYGEN SATURATION: 96 % | HEART RATE: 79 BPM | BODY MASS INDEX: 28.99 KG/M2 | RESPIRATION RATE: 20 BRPM | TEMPERATURE: 97.8 F | DIASTOLIC BLOOD PRESSURE: 93 MMHG | SYSTOLIC BLOOD PRESSURE: 149 MMHG | WEIGHT: 214.07 LBS | HEIGHT: 72 IN

## 2021-09-01 LAB
EKG IMPRESSION: NORMAL
GLUCOSE BLD-MCNC: 288 MG/DL (ref 65–99)

## 2021-09-01 PROCEDURE — 82962 GLUCOSE BLOOD TEST: CPT

## 2021-09-01 NOTE — ED PROVIDER NOTES
ED Provider Note    Scribed for Bo Medellin M.D. by Linda Sotomayor. 8/31/2021  9:07 PM    CHIEF COMPLAINT  Chief Complaint   Patient presents with   • Hyperglycemia       HPI  Beni Moore is a 51 y.o. male with a history of diabetes mellitus type II who presents for evaluation of hyperglycemia. The patient was diagnosed with COVID-19 and was discharged from the hospital on 8/21/21. The patient has been taking Dexamethasone for the past 5 days. The patient normally checks his BS twice a daily. His diabetes is typically well-controlled in ths 95-140s. His BS was in the 200s yesterday and was 404 this morning when he checked. He gave himself an extra Lantus 20 and another dose of glimepiride. However, his BS did not improve. At 3 PM, his sugar was 550+. After taking 20 short acting that he got from his friend, his BS dropped to 350. After eating something, his BS returned to 500+ an hour later. The patient endorses associated lightheadedness, fatigue, and polydipsia. He also notes that his hands feel tingly. Denies changes in appetite, nausea, vomiting, dysuria, chest pain, or abdominal pain.     PPE Note: I personally donned full PPE for all patient encounters during this visit, including being clean-shaven with an N95 respirator mask, gloves, gown, and goggles.     Scribe remained outside the patient's room and did not have any contact with the patient for the duration of patient encounter.     REVIEW OF SYSTEMS  Constitutional: Fatigue. No fevers or chills.   Skin: No rashes or diaphoresis.  Eyes: No change in vision, no discharge.  ENT: No hearing change. No rhinorrhea or nasal congestion, no ST or difficulty swallowing.  Respiratory: No SOB. No coughing or hemoptysis. No Wheezing.  Cardiac: No CP, palpitations, edema. No PND or orthopnea.  GI: No Abdominal Pain; N/V; diarrhea, constipation. No blood in stool.  : No dysuria.  No D/C. No frequency or urgency. No hesitancy.   MSK: No pain in joints or muscles. No calf pain or swelling.  Neuro: Lightheadedness and tingling in the hands. No HA. No focal weakness.  Endocrine: Polydipsia. No polyuria. No heat or cold intolerance. No changes in appetite.  Heme: No easy bruising. No history of bleeding disorders or anemia.    PAST MEDICAL HISTORY   has a past medical history of Diabetes (HCC) and Hypertension.    SOCIAL HISTORY  Social History     Tobacco Use   • Smoking status: Never Smoker   • Smokeless tobacco: Never Used   Vaping Use   • Vaping Use: Never used   Substance and Sexual Activity   • Alcohol use: No   • Drug use: No   • Sexual activity: None noted     SURGICAL HISTORY   has a past surgical history that includes other orthopedic surgery; other; toe amputation (Right, 7/2/2018); achilles tendon repair (Right, 7/2/2018); irrigation & debridement ortho (Right, 7/2/2018); vasectomy; tonsillectomy; and other abdominal surgery.    CURRENT MEDICATIONS  Home Medications     Reviewed by Janna Mcpherson R.N. (Registered Nurse) on 08/31/21 at 2007  Med List Status: Complete   Medication Last Dose Status   aspirin 81 MG EC tablet  Active   atorvastatin (LIPITOR) 80 MG tablet  Active   benzonatate (TESSALON) 100 MG Cap  Active   carvedilol (COREG) 6.25 MG Tab  Active   cholestyramine light (PREVALITE) 4 GM/DOSE powder  Active   dexamethasone (DECADRON) 4 MG Tab  Active   Dulaglutide (TRULICITY) 1.5 MG/0.5ML Solution Pen-injector  Active   gabapentin (NEURONTIN) 100 MG Cap  Active   glimepiride (AMARYL) 4 MG Tab  Active   ibuprofen (MOTRIN) 200 MG Tab  Active   insulin glargine (LANTUS) 100 UNIT/ML Solution  Active   omeprazole (PRILOSEC) 20 MG delayed-release capsule  Active   Ranibizumab (LUCENTIS IZ)  Active              ALLERGIES  No Known Allergies    PHYSICAL EXAM  VITAL SIGNS: /81   Pulse 90   Temp 36 °C (96.8 °F) (Temporal)   Resp 16   Ht 1.829 m (6')   Wt 97.1 kg (214 lb  1.1 oz)   SpO2 96%   BMI 29.03 kg/m²    Pulse ox interpretation: I interpret this pulse ox as normal.  Genl: M sitting in chair comfortably, speaking clearly, appears in no acute distress, nontoxic apperance  Head: NC/AT   ENT: Mucous membranes dry, posterior pharynx clear, uvula midline, nares patent bilaterally   Eyes: Normal sclera, pupils equal round reactive to light  Neck: Supple, FROM, no LAD appreciated   Pulmonary: Lungs are clear to auscultation bilaterally  Chest: No TTP  CV:  RRR, no murmur appreciated, pulses 2+ in both upper and lower extremities,  Abdomen: soft, NT/ND; no rebound/guarding, no masses palpated, no HSM   : no CVA or suprapubic tenderness   Musculoskeletal: Pain free ROM of the neck. Moving upper and lower extremities and spontaneous in coordinated fashion  Neuro: A&Ox4 (person, place, time, situation), speech fluent, gait steady, no focal deficits appreciated, No cerebellar signs. Sensation is grossly intact in the distal upper and lower extremities, 5/5 strength in  and dorsiflexion/plantar flexion of the ankles  Psych: Patient has an appropriate affect and behavior  Skin: No rash or lesions.  No pallor or jaundice.  No cyanosis.  Warm and dry.     DIAGNOSTIC STUDIES / PROCEDURES    EKG  Results for orders placed or performed during the hospital encounter of 21   EKG   Result Value Ref Range    Report       [facility] Emergency Dept.    Test Date:  2021  Pt Name:    HELIO BARROW                     Department: ER  MRN:        [id number]                      Room:        14  Gender:     Male                         Technician: [id number]  :        1970                   Requested By:NELLA STOLL  Order #:    [id number]                    Reading MD: Vignesh Soriano MD    Measurements  Intervals                                Axis  Rate:       83                           P:          25  SD:         137                          QRS:        54  QRSD:        108                          T:          117  QT:         395  QTc:        464    Interpretive Statements  Sinus rhythm  Borderline T abnormalities, inferior leads  Compared to ECG 08/19/2021 17:27:24  ST (T wave) deviation no longer present  T-wave abnormality still present  Electronically Signed On 9-1-2021 0:50:47 PDT by Vignesh Soriano MD           LABS  Labs Reviewed   CBC WITH DIFFERENTIAL - Abnormal; Notable for the following components:       Result Value    WBC 16.3 (*)     RBC 4.17 (*)     Hemoglobin 12.5 (*)     Hematocrit 35.8 (*)     Neutrophils-Polys 81.30 (*)     Lymphocytes 12.30 (*)     Immature Granulocytes 1.00 (*)     Neutrophils (Absolute) 13.25 (*)     Monos (Absolute) 0.86 (*)     Immature Granulocytes (abs) 0.16 (*)     All other components within normal limits   COMP METABOLIC PANEL - Abnormal; Notable for the following components:    Sodium 126 (*)     Co2 18 (*)     Glucose 457 (*)     Bun 41 (*)     Creatinine 1.68 (*)     All other components within normal limits   VENOUS BLOOD GAS - Abnormal; Notable for the following components:    Venous Bg Pco2 37.1 (*)     Venous Bg Po2 23.4 (*)     Venous Bg Hco3 20 (*)     All other components within normal limits   ESTIMATED GFR - Abnormal; Notable for the following components:    GFR If  52 (*)     GFR If Non  43 (*)     All other components within normal limits   POCT GLUCOSE DEVICE RESULTS - Abnormal; Notable for the following components:    Glucose - Accu-Ck 411 (*)     All other components within normal limits   POCT GLUCOSE DEVICE RESULTS - Abnormal; Notable for the following components:    Glucose - Accu-Ck 288 (*)     All other components within normal limits   BETA-HYDROXYBUTYRIC ACID   POCT GLUCOSE     All labs reviewed by me.    RADIOLOGY  DX-CHEST-PORTABLE (1 VIEW)   Final Result      Ill-defined opacities in the periphery of the lung bases, suspicious for COVID pneumonia.        The radiologist's  interpretations of all radiological studies have been reviewed by me.        COURSE & MEDICAL DECISION MAKING  Pertinent Labs & Imaging studies reviewed. (See chart for details)    Differential diagnoses include but not limited to: medication side effect, DKA, bacterial pneumonia, UTI, dehydration.     9:07 PM - Patient seen and examined at bedside. Patient will be treated with NS infusion 1,000 mL. Ordered DX-Chest, Venous Blood Gas, Beta-Hydroxybuteryric Acid, CBC with Differential, CMP, POCT Glucose, UA, and EKG to evaluate his symptoms.     Medical Decision Making:   Patient presents emergency room for symptoms as described above.  The patient has had recent treatment for Covid pneumonia and is currently having overall improvement in his symptoms however following administration of steroid therapy has had difficulty controlling his blood sugars.  He has had subsequent polyuria/polydipsia but has been taking additional doses of his insulin therapies with some improvement but overall was concerned regarding the fluctuations.  He does appear slightly dry and upon initial assessment fluids were administered and serial blood sugar checks were obtained along with assessment of overall electrolyte status.  Patient has pseudohyponatremia with correction from 126 of into the normal range with his glucose initially at 457.  He does not have an anion gap or concerning bicarb decreased at this time.  He has a incidental leukocytosis and leftward shift that I believe is due to the recent steroid course, completed earlier today.  He has slight creatinine increased to 1.68 from baseline of 1.3 that is likely secondary to the dehydration.  He is not acutely acidotic, beta hydroxy is not elevated and I do not believe this represents any changes to DKA or hyperosmolar state.  Following fluid resuscitation, patient's glucose down trended to 88, he feels substantially improved and I would recommend stopping the steroid therapy as  he has been asymptomatic of his Covid pneumonia for 10 days and would recommend continuing his longstanding insulin therapy and would not dion from this established treatment regiment until he has been off the steroids for 48 hours.  He has a follow-up appointment within the next week and a half with his established provider and if he has any interval changes or development of fevers, chills, cough, abdominal pain or dysuria I would recommend returning to the emergency room for reevaluation.    HYDRATION: Based on the patient's presentation of Other clinical signs of dehydration the patient was given IV fluids. IV Hydration was used because oral hydration was not adequate alone. Upon recheck following hydration, the patient was improved.    FINAL IMPRESSION  Visit Diagnoses     ICD-10-CM   1. Hyperglycemia  R73.9     Linda MCCARTHY (Jordy), am scribing for, and in the presence of, Bo Medellin M.D..    Electronically signed by: Linda Sotomayor (Jordy), 8/31/2021    Bo MCCARTHY M.D. personally performed the services described in this documentation, as scribed by Linda Sotomayor in my presence, and it is both accurate and complete.    The note accurately reflects work and decisions made by me.  Bo Medellin M.D.  9/1/2021  12:53 AM     [facility]

## 2021-09-01 NOTE — ED TRIAGE NOTES
Pt arrives with complaints of FSBS at home > 500. He is a type II diabetic. Recently released from hospital for COVID on a dexamethasone prescription. +fatigue and lightheadedness .    Mask in place. It has been > 10 days since onset of covid symptoms so he is not isolated.     Pt educated on ED process and asked to wait in lobby. Patient educated on importance of alerting staff to new or worsening symptoms or concerns.

## 2021-09-01 NOTE — ED NOTES
Pt d/c from ED a/o x 4 GCS 15 ambulatory without assistance with steady gait, PIV removed with catheter intact, Pt given d/c instructions and verbalized understanding.

## 2021-09-13 ENCOUNTER — NON-PROVIDER VISIT (OUTPATIENT)
Dept: MEDICAL GROUP | Facility: PHYSICIAN GROUP | Age: 51
End: 2021-09-13
Payer: MEDICARE

## 2021-09-13 ENCOUNTER — TELEMEDICINE (OUTPATIENT)
Dept: MEDICAL GROUP | Facility: PHYSICIAN GROUP | Age: 51
End: 2021-09-13
Payer: MEDICARE

## 2021-09-13 ENCOUNTER — HOSPITAL ENCOUNTER (OUTPATIENT)
Dept: LAB | Facility: MEDICAL CENTER | Age: 51
End: 2021-09-13
Attending: FAMILY MEDICINE
Payer: MEDICARE

## 2021-09-13 ENCOUNTER — ANTICOAGULATION MONITORING (OUTPATIENT)
Dept: MEDICAL GROUP | Facility: PHYSICIAN GROUP | Age: 51
End: 2021-09-13

## 2021-09-13 VITALS
DIASTOLIC BLOOD PRESSURE: 76 MMHG | HEIGHT: 72 IN | WEIGHT: 215 LBS | BODY MASS INDEX: 29.12 KG/M2 | SYSTOLIC BLOOD PRESSURE: 120 MMHG

## 2021-09-13 DIAGNOSIS — D64.9 NORMOCYTIC ANEMIA: ICD-10-CM

## 2021-09-13 DIAGNOSIS — E11.42 CONTROLLED TYPE 2 DIABETES MELLITUS WITH DIABETIC POLYNEUROPATHY, WITH LONG-TERM CURRENT USE OF INSULIN (HCC): ICD-10-CM

## 2021-09-13 DIAGNOSIS — D64.9 ANEMIA, UNSPECIFIED TYPE: ICD-10-CM

## 2021-09-13 DIAGNOSIS — E87.1 HYPONATREMIA: ICD-10-CM

## 2021-09-13 DIAGNOSIS — Z79.4 CONTROLLED TYPE 2 DIABETES MELLITUS WITH DIABETIC POLYNEUROPATHY, WITH LONG-TERM CURRENT USE OF INSULIN (HCC): ICD-10-CM

## 2021-09-13 LAB
ALBUMIN SERPL BCP-MCNC: 3 G/DL (ref 3.2–4.9)
ALBUMIN/GLOB SERPL: 0.9 G/DL
ALP SERPL-CCNC: 58 U/L (ref 30–99)
ALT SERPL-CCNC: 19 U/L (ref 2–50)
ANION GAP SERPL CALC-SCNC: 10 MMOL/L (ref 7–16)
AST SERPL-CCNC: 18 U/L (ref 12–45)
BASOPHILS # BLD AUTO: 0.7 % (ref 0–1.8)
BASOPHILS # BLD: 0.05 K/UL (ref 0–0.12)
BILIRUB SERPL-MCNC: 1.6 MG/DL (ref 0.1–1.5)
BUN SERPL-MCNC: 15 MG/DL (ref 8–22)
CALCIUM SERPL-MCNC: 8.2 MG/DL (ref 8.5–10.5)
CHLORIDE SERPL-SCNC: 110 MMOL/L (ref 96–112)
CO2 SERPL-SCNC: 18 MMOL/L (ref 20–33)
CREAT SERPL-MCNC: 0.88 MG/DL (ref 0.5–1.4)
EOSINOPHIL # BLD AUTO: 0.13 K/UL (ref 0–0.51)
EOSINOPHIL NFR BLD: 1.8 % (ref 0–6.9)
ERYTHROCYTE [DISTWIDTH] IN BLOOD BY AUTOMATED COUNT: 47 FL (ref 35.9–50)
FASTING STATUS PATIENT QL REPORTED: NORMAL
GLOBULIN SER CALC-MCNC: 3.3 G/DL (ref 1.9–3.5)
GLUCOSE SERPL-MCNC: 98 MG/DL (ref 65–99)
HCT VFR BLD AUTO: 34.1 % (ref 42–52)
HGB BLD-MCNC: 11.2 G/DL (ref 14–18)
IMM GRANULOCYTES # BLD AUTO: 0.03 K/UL (ref 0–0.11)
IMM GRANULOCYTES NFR BLD AUTO: 0.4 % (ref 0–0.9)
LYMPHOCYTES # BLD AUTO: 1.72 K/UL (ref 1–4.8)
LYMPHOCYTES NFR BLD: 23.8 % (ref 22–41)
MCH RBC QN AUTO: 30.3 PG (ref 27–33)
MCHC RBC AUTO-ENTMCNC: 32.8 G/DL (ref 33.7–35.3)
MCV RBC AUTO: 92.2 FL (ref 81.4–97.8)
MONOCYTES # BLD AUTO: 0.85 K/UL (ref 0–0.85)
MONOCYTES NFR BLD AUTO: 11.8 % (ref 0–13.4)
NEUTROPHILS # BLD AUTO: 4.45 K/UL (ref 1.82–7.42)
NEUTROPHILS NFR BLD: 61.5 % (ref 44–72)
NRBC # BLD AUTO: 0 K/UL
NRBC BLD-RTO: 0 /100 WBC
PLATELET # BLD AUTO: 172 K/UL (ref 164–446)
PMV BLD AUTO: 11 FL (ref 9–12.9)
POTASSIUM SERPL-SCNC: 4.1 MMOL/L (ref 3.6–5.5)
PROT SERPL-MCNC: 6.3 G/DL (ref 6–8.2)
RBC # BLD AUTO: 3.7 M/UL (ref 4.7–6.1)
SODIUM SERPL-SCNC: 138 MMOL/L (ref 135–145)
WBC # BLD AUTO: 7.2 K/UL (ref 4.8–10.8)

## 2021-09-13 PROCEDURE — 80053 COMPREHEN METABOLIC PANEL: CPT

## 2021-09-13 PROCEDURE — 99213 OFFICE O/P EST LOW 20 MIN: CPT | Mod: 95 | Performed by: FAMILY MEDICINE

## 2021-09-13 PROCEDURE — 36415 COLL VENOUS BLD VENIPUNCTURE: CPT

## 2021-09-13 PROCEDURE — 99211 OFF/OP EST MAY X REQ PHY/QHP: CPT | Performed by: INTERNAL MEDICINE

## 2021-09-13 PROCEDURE — 85025 COMPLETE CBC W/AUTO DIFF WBC: CPT

## 2021-09-13 RX ORDER — CHOLESTYRAMINE 4 G/9G
4 POWDER, FOR SUSPENSION ORAL 2 TIMES DAILY
COMMUNITY
Start: 2021-08-30 | End: 2023-01-10

## 2021-09-13 RX ORDER — EMPAGLIFLOZIN 10 MG/1
1 TABLET, FILM COATED ORAL DAILY
Qty: 14 TABLET | Refills: 0 | Status: SHIPPED | OUTPATIENT
Start: 2021-09-13 | End: 2021-09-27

## 2021-09-13 ASSESSMENT — FIBROSIS 4 INDEX: FIB4 SCORE: 1.27

## 2021-09-13 NOTE — PROGRESS NOTES
Patient Consult Note - Initial Visit    TIME IN: 3:02pm  TIME OUT: 3:38pm    Primary care physician: Florinda Pascual M.D.    Reason for consult: Management of Uncontrolled Type 2 Diabetes    HPI:  Beni Moore is a 51 y.o. old patient who comes in today for evaluation of above stated problem.    Most Recent HbA1c:   Lab Results   Component Value Date/Time    HBA1C 7.8 (H) 06/28/2021 08:27 AM      Lab Results   Component Value Date/Time    CREATININE 0.88 09/13/2021 07:35 AM              Diabetes Medication History and Current Regimen    GLP-1 Agent: Dulaglutide 1.5 mg once weekly     Sulfonylurea: Glimepiride 4mg daily  Thiazolidinedione: none  DPP-4 Inhibitor: none  Megltitinide: none      Basal Insulin: Lantus 10-20 units every night at bedtime    Pt has home glucometer and proper testing technique - Yes    Pt reports blood sugars:   Before Breakfast: no logs at today's visit, states 120 this am, 100 yesterday  Before Lunch:   Before Dinner:   Before Bedtime:   Other times:     Hypoglycemia awareness - Yes  Nocturnal hypoglycemia- None  Hypoglycemia:  None    Current Exercise - Minimal regular exercise  Exercise Goal - Difficultly with aerobic exercise given amputation on right foot.  Will discuss further at subsequent visits.    Dietary - Patient has excellent understanding of proper diabetic diet and need for minimal simple carbs and sugars.  He practices good portion control and denies snacking on sweets, sodas, or juices.    Pt reports eating:  Great portion control  Breakfast - eggs, sausage, yogurts  Snack - none  Lunch - meats, some bread, small portion  Snack - no fruits   Dinner - veggies, meats  Snack - none    Foot Exam:  Monofilament exam - up to date, not conducted today    Preventative Management  BP regimen (ACE/ARB) - none  ASA - 81mg daily  Statin - Atorvastatin 80mg daily  Last Retinal Scan - regular visits with [facility] (Dr Dumont)  Last Foot Exam - regular podiatry f/u  Last A1c -   Lab  Results   Component Value Date/Time    HBA1C 7.8 (H) 06/28/2021 08:27 AM      Last Microalbuminuria - 6/2021    updated caregaps    Past Medical History:  Patient Active Problem List    Diagnosis Date Noted   • Class 1 obesity due to excess calories with serious comorbidity and body mass index (BMI) of 30.0 to 30.9 in adult 08/20/2021   • Syncope and collapse 08/19/2021   • Hypoglycemia 08/19/2021   • COVID-19 08/19/2021   • Sepsis ([facility]) 08/19/2021   • Elevated troponin 08/19/2021   • Hyponatremia 08/19/2021   • Lower limb diabetic mononeuropathy ([facility]) 07/16/2021   • Controlled type 2 diabetes mellitus, with long-term current use of insulin ([facility]) 07/16/2021   • Partial thickness burn of right foot 07/02/2021   • Retinopathy 01/14/2020   • Conductive hearing loss, bilateral 01/14/2020   • Coronary artery disease involving native coronary artery of native heart without angina pectoris 10/02/2019   • Functional diarrhea 09/27/2019   • Essential hypertension 09/21/2019   • NSTEMI (non-ST elevated myocardial infarction) ([facility]) 09/21/2019   • AXEL (acute kidney injury) ([facility]) 09/21/2019   • S/P transmetatarsal amputation of foot, right ([facility]) 07/05/2018   • Normocytic anemia 06/30/2018   • Overweight (BMI 25.0-29.9) 06/30/2018       Past Surgical History:  Past Surgical History:   Procedure Laterality Date   • TOE AMPUTATION Right 7/2/2018    Procedure: Transmetatarsal amputation;  Surgeon: Ravi Bernardo M.D.;  Location: [facility];  Service: Orthopedics   • ACHILLES TENDON REPAIR Right 7/2/2018    Procedure: ACHILLES LENGTHENING;  Surgeon: Ravi Bernardo M.D.;  Location: [facility];  Service: Orthopedics   • IRRIGATION & DEBRIDEMENT ORTHO Right 7/2/2018    Procedure: IRRIGATION & DEBRIDEMENT ORTHO;  Surgeon: Ravi Bernardo M.D.;  Location: [facility];  Service: Orthopedics   • OTHER     • OTHER ABDOMINAL SURGERY     • OTHER ORTHOPEDIC SURGERY     • TONSILLECTOMY     • VASECTOMY          Allergies:  Patient has no known allergies.    Social History:  Social History     Socioeconomic History   • Marital status:      Spouse name: Not on file   • Number of children: Not on file   • Years of education: Not on file   • Highest education level: 12th grade   Occupational History   • Not on file   Tobacco Use   • Smoking status: Never Smoker   • Smokeless tobacco: Never Used   Vaping Use   • Vaping Use: Never used   Substance and Sexual Activity   • Alcohol use: No   • Drug use: No   • Sexual activity: Not on file   Other Topics Concern   • Not on file   Social History Narrative   • Not on file     Social Determinants of Health     Financial Resource Strain: Low Risk    • Difficulty of Paying Living Expenses: Not hard at all   Food Insecurity: No Food Insecurity   • Worried About Running Out of Food in the Last Year: Never true   • Ran Out of Food in the Last Year: Never true   Transportation Needs: No Transportation Needs   • Lack of Transportation (Medical): No   • Lack of Transportation (Non-Medical): No   Physical Activity: Insufficiently Active   • Days of Exercise per Week: 3 days   • Minutes of Exercise per Session: 30 min   Stress: No Stress Concern Present   • Feeling of Stress : Not at all   Social Connections: Moderately Isolated   • Frequency of Communication with Friends and Family: More than three times a week   • Frequency of Social Gatherings with Friends and Family: More than three times a week   • Attends Restoration Services: Never   • Active Member of Clubs or Organizations: No   • Attends Club or Organization Meetings: Never   • Marital Status:    Intimate Partner Violence:    • Fear of Current or Ex-Partner:    • Emotionally Abused:    • Physically Abused:    • Sexually Abused:        Family History:  Family History   Problem Relation Age of Onset   • No Known Problems Mother    • Diabetes Father    • Heart Disease Father    • Diabetes Maternal Grandmother    • Heart  Disease Maternal Grandfather    • Lung Disease Paternal Grandmother    • Cancer Paternal Grandmother    • Cancer Paternal Grandfather    • Lung Cancer Paternal Grandfather        Medications:    Current Outpatient Medications:   •  dexamethasone (DECADRON) 4 MG Tab, Take 6 mg by mouth every day., Disp: , Rfl:   •  benzonatate (TESSALON) 100 MG Cap, Take 1 Capsule by mouth 3 times a day as needed for Cough., Disp: 21 Capsule, Rfl: 0  •  carvedilol (COREG) 6.25 MG Tab, Take 6.25 mg by mouth 2 times a day., Disp: , Rfl:   •  gabapentin (NEURONTIN) 100 MG Cap, Take 100 mg by mouth at bedtime., Disp: , Rfl:   •  insulin glargine (LANTUS) 100 UNIT/ML Solution, Inject 10-20 Units under the skin every evening., Disp: , Rfl:   •  ibuprofen (MOTRIN) 200 MG Tab, Take 400 mg by mouth every 6 hours as needed for Mild Pain., Disp: , Rfl:   •  cholestyramine light (PREVALITE) 4 GM/DOSE powder, Take 4 g by mouth 2 times a day. Mix 1 scoop (4 g) in applesauce or juice and take twice daily., Disp: , Rfl:   •  atorvastatin (LIPITOR) 80 MG tablet, Take 1 tablet by mouth every evening., Disp: 90 tablet, Rfl: 3  •  Dulaglutide (TRULICITY) 1.5 MG/0.5ML Solution Pen-injector, Inject 0.5 mL under the skin every 7 days for 90 days., Disp: 13 Each, Rfl: 3  •  aspirin 81 MG EC tablet, Take 1 tablet by mouth every day., Disp: 90 tablet, Rfl: 0  •  glimepiride (AMARYL) 4 MG Tab, Take 1 tablet by mouth every day., Disp: 90 tablet, Rfl: 3  •  Ranibizumab (LUCENTIS IZ), 1 Dose by Intravitreal route every 8 weeks., Disp: , Rfl:   •  omeprazole (PRILOSEC) 20 MG delayed-release capsule, Take 20 mg by mouth every morning., Disp: , Rfl:     Labs: Reviewed    Physical Examination:  Vital signs: There were no vitals taken for this visit. There is no height or weight on file to calculate BMI.    Assessment and Plan:    1. DM2  Patient seen for initial visit for DMII care.  Longstanding hx of DMII.  Patient admits to many years of neglecting healthcare, but  has been much better in the last 5+ years.  Hx of NSTEMI in Sept of 2019.  Well versed in disease state, dietary influences, need for routine monitoring, but did touch on the following.    Basic physiology of DMII was explained to patient as well as microvascular/macrovascular complications. The importance of increasing physical activity to improve diabetes control was discussed with the patient. Patient was also educated on changing diet and making better choices to help control blood sugar.    Discussed current medication therapy as well as desired treatment modalities.  Given established ASCVD, discussed importance of addition of SGLT2i to reduce three point MACE.  He is agreeable to starting Jardiance and applying for assistance through PAP.      - Medication changes -   Start Jardiance 10mg daily  Stop glimepiride.  Continue all other medications for now.  Patient sent with Dayan Napier and [employer] PAP paperwork to return ASAP.     - Lifestyle changes -   Diet:  Continue to focus on diabetic friendly diet and practice good portion control.  Exercise:  To tolerability, will address further at subsequent visit.    Follow Up:  About four to five weeks.    Tomer Jimenes, PharmD, [facility]    09/13/21    CC:   Florinda Pascual M.D.  Theo Mike M.D.                                                   [facility] Consent      Name    Beni Moore    MRN number: [id number]    the following are guidelines for participation in the [facility].     I, ____Beni Moore_____, understand and voluntarily agree to participate in the [facility] and to have services provided to me by pharmacists working in collaboration with my provider.    I understand the pharmacist within the [facility] may initiate, modify or discontinue my medications, order appropriate testing and appointments, perform exams, monitor treatment, and  make clinical evaluations and decisions pursuant to a collaborative practice agreement with my provider.  I understand the pharmacist within the [facility] is not a physician, osteopathic physician, advanced practice registered nurse or physician assistant and may not diagnose.  I will take all my medications as instructed and not change the way I take it without first talking to my provider or a pharmacist within the [facility].  I understand that if I am late to my appointment I may not be able to be seen by a pharmacist at that time and will have to reschedule my appointment.  During appointment with pharmacist I understand that pharmacist has the right not to answer questions or perform services outside the pharmacist’s scope of practice.  By signing below, I provide informed consent for the pharmacist to provide these services and for my participation in the [facility].      Beni Andrea Teresa           [id number]          09/13/21  Patient Name                   MRN number  Date     ___X_Obtained verbal consent from pt, No signature due to COVID concerns___   Patient Signature

## 2021-09-14 NOTE — PROGRESS NOTES
Virtual Visit: Established Patient   This visit was conducted via Zoom using secure and encrypted videoconferencing technology.   The patient was in a private location in the state of [place].    The patient's identity was confirmed and verbal consent was obtained for this virtual visit.    Subjective:   CC:   Chief Complaint   Patient presents with   • Follow-Up       Beni Moore is a 51 y.o. male here for follow-up.  Patient did meet with the pharmacist today and he was started started on Jardiance and his glimepiride was stopped.  Patient feels that his blood sugars are getting better.  Patient states his foot has healed well and has and he is having no problems.  Patient has recovered from his Covid.    ROS     Current medicines (including changes today)  Current Outpatient Medications   Medication Sig Dispense Refill   • cholestyramine (QUESTRAN) 4 GM/DOSE powder      • Empagliflozin (JARDIANCE) 10 MG Tab Take 1 Tablet by mouth every day. 14 Tablet 0   • carvedilol (COREG) 6.25 MG Tab Take 6.25 mg by mouth 2 times a day.     • gabapentin (NEURONTIN) 100 MG Cap Take 100 mg by mouth at bedtime.     • insulin glargine (LANTUS) 100 UNIT/ML Solution Inject 10-20 Units under the skin every evening.     • ibuprofen (MOTRIN) 200 MG Tab Take 400 mg by mouth every 6 hours as needed for Mild Pain.     • cholestyramine light (PREVALITE) 4 GM/DOSE powder Take 4 g by mouth 2 times a day. Mix 1 scoop (4 g) in applesauce or juice and take twice daily.     • atorvastatin (LIPITOR) 80 MG tablet Take 1 tablet by mouth every evening. 90 tablet 3   • Dulaglutide (TRULICITY) 1.5 MG/0.5ML Solution Pen-injector Inject 0.5 mL under the skin every 7 days for 90 days. 13 Each 3   • aspirin 81 MG EC tablet Take 1 tablet by mouth every day. 90 tablet 0   • glimepiride (AMARYL) 4 MG Tab Take 1 tablet by mouth every day. 90 tablet 3   • Ranibizumab (LUCENTIS IZ) 1 Dose by Intravitreal route every 8 weeks.     • omeprazole (PRILOSEC) 20 MG  delayed-release capsule Take 20 mg by mouth every morning.       No current facility-administered medications for this visit.       Patient Active Problem List    Diagnosis Date Noted   • Class 1 obesity due to excess calories with serious comorbidity and body mass index (BMI) of 30.0 to 30.9 in adult 08/20/2021   • Syncope and collapse 08/19/2021   • Hypoglycemia 08/19/2021   • COVID-19 08/19/2021   • Sepsis ([facility]) 08/19/2021   • Elevated troponin 08/19/2021   • Hyponatremia 08/19/2021   • Lower limb diabetic mononeuropathy ([facility]) 07/16/2021   • Controlled type 2 diabetes mellitus, with long-term current use of insulin ([facility]) 07/16/2021   • Partial thickness burn of right foot 07/02/2021   • Retinopathy 01/14/2020   • Conductive hearing loss, bilateral 01/14/2020   • Coronary artery disease involving native coronary artery of native heart without angina pectoris 10/02/2019   • Functional diarrhea 09/27/2019   • Essential hypertension 09/21/2019   • NSTEMI (non-ST elevated myocardial infarction) ([facility]) 09/21/2019   • AXEL (acute kidney injury) ([facility]) 09/21/2019   • S/P transmetatarsal amputation of foot, right ([facility]) 07/05/2018   • Normocytic anemia 06/30/2018   • Overweight (BMI 25.0-29.9) 06/30/2018        Objective:   /76 Comment: Pt states  Ht 1.829 m (6') Comment: Pt states  Wt 97.5 kg (215 lb) Comment: Pt states  BMI 29.16 kg/m²     Physical Exam:  Constitutional: Alert, no distress, well-groomed.  Skin: No rashes in visible areas.  Eye: Round. Conjunctiva clear, lids normal.  ENMT: Lips pink without lesions, good dentition . Phonation normal.  Neck:  Moves freely without pain.  Respiratory: Unlabored respiratory effort, no cough or audible wheeze  Psych: Alert and oriented x3, normal affect and mood.     Assessment and Plan:   The following treatment plan was discussed:     1. Normocytic anemia  His white blood cell count is back to normal his hemoglobin is slightly decreased but has been that way for a  while.  We will recheck his CBC again in 2 months.  This is a chronic problem  2. Controlled type 2 diabetes mellitus with diabetic polyneuropathy, with long-term current use of insulin (HCC)  We will change of his diabetic medication we will recheck his hemoglobin A1c in about 2 months or sooner patient to continue monitoring his fingersticks and follow-up sooner if there is any concerns or problems this is a chronic problem.  I also advised patient to go ahead and get the Covid vaccination.    Follow-up: Return in about 2 months (around 11/13/2021).

## 2021-09-20 ENCOUNTER — ANTICOAGULATION MONITORING (OUTPATIENT)
Dept: VASCULAR LAB | Facility: MEDICAL CENTER | Age: 51
End: 2021-09-20

## 2021-09-20 NOTE — PROGRESS NOTES
Received notice from [facility] that patient has been approved for patient assistance for Jardiance.  Tomer Jimenes, PharmD, BCACP

## 2021-09-24 DIAGNOSIS — I21.4 NSTEMI (NON-ST ELEVATED MYOCARDIAL INFARCTION) (HCC): ICD-10-CM

## 2021-09-24 RX ORDER — ASPIRIN 81 MG/1
81 TABLET ORAL
Qty: 90 TABLET | Refills: 3 | Status: SHIPPED | OUTPATIENT
Start: 2021-09-24 | End: 2021-09-27 | Stop reason: SDUPTHER

## 2021-09-27 ENCOUNTER — PATIENT MESSAGE (OUTPATIENT)
Dept: CARDIOLOGY | Facility: MEDICAL CENTER | Age: 51
End: 2021-09-27

## 2021-09-27 DIAGNOSIS — I21.4 NSTEMI (NON-ST ELEVATED MYOCARDIAL INFARCTION) (HCC): ICD-10-CM

## 2021-09-27 RX ORDER — CARVEDILOL 6.25 MG/1
6.25 TABLET ORAL 2 TIMES DAILY
Qty: 180 TABLET | Refills: 3 | Status: SHIPPED | OUTPATIENT
Start: 2021-09-27 | End: 2021-10-14 | Stop reason: SDUPTHER

## 2021-09-27 RX ORDER — ASPIRIN 81 MG/1
81 TABLET ORAL
Qty: 90 TABLET | Refills: 3 | Status: SHIPPED | OUTPATIENT
Start: 2021-09-27 | End: 2022-11-04

## 2021-09-30 ENCOUNTER — ANESTHESIA (OUTPATIENT)
Dept: SURGERY | Facility: MEDICAL CENTER | Age: 51
DRG: 854 | End: 2021-09-30
Payer: MEDICARE

## 2021-09-30 ENCOUNTER — ANESTHESIA EVENT (OUTPATIENT)
Dept: SURGERY | Facility: MEDICAL CENTER | Age: 51
DRG: 854 | End: 2021-09-30
Payer: MEDICARE

## 2021-09-30 ENCOUNTER — HOSPITAL ENCOUNTER (INPATIENT)
Facility: MEDICAL CENTER | Age: 51
LOS: 4 days | DRG: 854 | End: 2021-10-04
Attending: STUDENT IN AN ORGANIZED HEALTH CARE EDUCATION/TRAINING PROGRAM | Admitting: STUDENT IN AN ORGANIZED HEALTH CARE EDUCATION/TRAINING PROGRAM
Payer: MEDICARE

## 2021-09-30 ENCOUNTER — APPOINTMENT (OUTPATIENT)
Dept: RADIOLOGY | Facility: MEDICAL CENTER | Age: 51
DRG: 854 | End: 2021-09-30
Attending: EMERGENCY MEDICINE
Payer: MEDICARE

## 2021-09-30 DIAGNOSIS — E11.628 DIABETIC FOOT INFECTION (HCC): ICD-10-CM

## 2021-09-30 DIAGNOSIS — L08.9 DIABETIC FOOT INFECTION (HCC): ICD-10-CM

## 2021-09-30 DIAGNOSIS — M86.9 TOE OSTEOMYELITIS (HCC): ICD-10-CM

## 2021-09-30 DIAGNOSIS — A41.9 SEPSIS, DUE TO UNSPECIFIED ORGANISM, UNSPECIFIED WHETHER ACUTE ORGAN DYSFUNCTION PRESENT (HCC): ICD-10-CM

## 2021-09-30 DIAGNOSIS — M86.172 OTHER ACUTE OSTEOMYELITIS OF LEFT FOOT (HCC): ICD-10-CM

## 2021-09-30 PROBLEM — I25.2 HISTORY OF NON-ST ELEVATION MYOCARDIAL INFARCTION (NSTEMI): Status: ACTIVE | Noted: 2021-09-30

## 2021-09-30 PROBLEM — Z86.16 HISTORY OF COVID-19: Status: ACTIVE | Noted: 2021-09-30

## 2021-09-30 PROBLEM — Z95.5 PRESENCE OF STENT IN CORONARY ARTERY: Status: ACTIVE | Noted: 2021-09-30

## 2021-09-30 PROBLEM — L03.032 CELLULITIS OF TOE OF LEFT FOOT: Status: ACTIVE | Noted: 2021-09-30

## 2021-09-30 LAB
ALBUMIN SERPL BCP-MCNC: 3.6 G/DL (ref 3.2–4.9)
ALBUMIN/GLOB SERPL: 1.1 G/DL
ALP SERPL-CCNC: 67 U/L (ref 30–99)
ALT SERPL-CCNC: 16 U/L (ref 2–50)
ANION GAP SERPL CALC-SCNC: 12 MMOL/L (ref 7–16)
AST SERPL-CCNC: 19 U/L (ref 12–45)
BASOPHILS # BLD AUTO: 0.4 % (ref 0–1.8)
BASOPHILS # BLD: 0.04 K/UL (ref 0–0.12)
BILIRUB SERPL-MCNC: 1.6 MG/DL (ref 0.1–1.5)
BUN SERPL-MCNC: 15 MG/DL (ref 8–22)
CALCIUM SERPL-MCNC: 8.8 MG/DL (ref 8.5–10.5)
CHLORIDE SERPL-SCNC: 102 MMOL/L (ref 96–112)
CO2 SERPL-SCNC: 21 MMOL/L (ref 20–33)
CREAT SERPL-MCNC: 1.15 MG/DL (ref 0.5–1.4)
CRP SERPL HS-MCNC: 1.08 MG/DL (ref 0–0.75)
EOSINOPHIL # BLD AUTO: 0.04 K/UL (ref 0–0.51)
EOSINOPHIL NFR BLD: 0.4 % (ref 0–6.9)
ERYTHROCYTE [DISTWIDTH] IN BLOOD BY AUTOMATED COUNT: 45.1 FL (ref 35.9–50)
ERYTHROCYTE [SEDIMENTATION RATE] IN BLOOD BY WESTERGREN METHOD: 55 MM/HOUR (ref 0–20)
EST. AVERAGE GLUCOSE BLD GHB EST-MCNC: 169 MG/DL
GLOBULIN SER CALC-MCNC: 3.3 G/DL (ref 1.9–3.5)
GLUCOSE BLD-MCNC: 114 MG/DL (ref 65–99)
GLUCOSE BLD-MCNC: 150 MG/DL (ref 65–99)
GLUCOSE SERPL-MCNC: 220 MG/DL (ref 65–99)
GRAM STN SPEC: NORMAL
HBA1C MFR BLD: 7.5 % (ref 4–5.6)
HCT VFR BLD AUTO: 35.7 % (ref 42–52)
HGB BLD-MCNC: 11.8 G/DL (ref 14–18)
IMM GRANULOCYTES # BLD AUTO: 0.03 K/UL (ref 0–0.11)
IMM GRANULOCYTES NFR BLD AUTO: 0.3 % (ref 0–0.9)
INR PPP: 1.04 (ref 0.87–1.13)
INR PPP: 1.13 (ref 0.87–1.13)
LACTATE BLD-SCNC: 1.3 MMOL/L (ref 0.5–2)
LACTATE BLD-SCNC: 1.4 MMOL/L (ref 0.5–2)
LYMPHOCYTES # BLD AUTO: 1.27 K/UL (ref 1–4.8)
LYMPHOCYTES NFR BLD: 14 % (ref 22–41)
MAGNESIUM SERPL-MCNC: 1.7 MG/DL (ref 1.5–2.5)
MCH RBC QN AUTO: 29.1 PG (ref 27–33)
MCHC RBC AUTO-ENTMCNC: 33.1 G/DL (ref 33.7–35.3)
MCV RBC AUTO: 88.1 FL (ref 81.4–97.8)
MONOCYTES # BLD AUTO: 1.09 K/UL (ref 0–0.85)
MONOCYTES NFR BLD AUTO: 12 % (ref 0–13.4)
MRSA DNA SPEC QL NAA+PROBE: NORMAL
NEUTROPHILS # BLD AUTO: 6.63 K/UL (ref 1.82–7.42)
NEUTROPHILS NFR BLD: 72.9 % (ref 44–72)
NRBC # BLD AUTO: 0 K/UL
NRBC BLD-RTO: 0 /100 WBC
PATHOLOGY CONSULT NOTE: NORMAL
PHOSPHATE SERPL-MCNC: 3 MG/DL (ref 2.5–4.5)
PLATELET # BLD AUTO: 311 K/UL (ref 164–446)
PMV BLD AUTO: 10 FL (ref 9–12.9)
POTASSIUM SERPL-SCNC: 3.9 MMOL/L (ref 3.6–5.5)
PROCALCITONIN SERPL-MCNC: 0.12 NG/ML
PROT SERPL-MCNC: 6.9 G/DL (ref 6–8.2)
PROTHROMBIN TIME: 13.3 SEC (ref 12–14.6)
PROTHROMBIN TIME: 14.2 SEC (ref 12–14.6)
RBC # BLD AUTO: 4.05 M/UL (ref 4.7–6.1)
SIGNIFICANT IND 70042: NORMAL
SIGNIFICANT IND 70042: NORMAL
SITE SITE: NORMAL
SITE SITE: NORMAL
SODIUM SERPL-SCNC: 135 MMOL/L (ref 135–145)
SOURCE SOURCE: NORMAL
SOURCE SOURCE: NORMAL
WBC # BLD AUTO: 9.1 K/UL (ref 4.8–10.8)

## 2021-09-30 PROCEDURE — 99221 1ST HOSP IP/OBS SF/LOW 40: CPT | Mod: 57 | Performed by: ORTHOPAEDIC SURGERY

## 2021-09-30 PROCEDURE — 160038 HCHG SURGERY MINUTES - EA ADDL 1 MIN LEVEL 2: Performed by: ORTHOPAEDIC SURGERY

## 2021-09-30 PROCEDURE — 0Y6U0Z0 DETACHMENT AT LEFT 3RD TOE, COMPLETE, OPEN APPROACH: ICD-10-PCS | Performed by: ORTHOPAEDIC SURGERY

## 2021-09-30 PROCEDURE — 500881 HCHG PACK, EXTREMITY: Performed by: ORTHOPAEDIC SURGERY

## 2021-09-30 PROCEDURE — 700111 HCHG RX REV CODE 636 W/ 250 OVERRIDE (IP): Performed by: EMERGENCY MEDICINE

## 2021-09-30 PROCEDURE — 83036 HEMOGLOBIN GLYCOSYLATED A1C: CPT

## 2021-09-30 PROCEDURE — 160036 HCHG PACU - EA ADDL 30 MINS PHASE I: Performed by: ORTHOPAEDIC SURGERY

## 2021-09-30 PROCEDURE — 160035 HCHG PACU - 1ST 60 MINS PHASE I: Performed by: ORTHOPAEDIC SURGERY

## 2021-09-30 PROCEDURE — 160048 HCHG OR STATISTICAL LEVEL 1-5: Performed by: ORTHOPAEDIC SURGERY

## 2021-09-30 PROCEDURE — 87075 CULTR BACTERIA EXCEPT BLOOD: CPT

## 2021-09-30 PROCEDURE — 700105 HCHG RX REV CODE 258: Performed by: STUDENT IN AN ORGANIZED HEALTH CARE EDUCATION/TRAINING PROGRAM

## 2021-09-30 PROCEDURE — 87070 CULTURE OTHR SPECIMN AEROBIC: CPT

## 2021-09-30 PROCEDURE — 87641 MR-STAPH DNA AMP PROBE: CPT

## 2021-09-30 PROCEDURE — 160027 HCHG SURGERY MINUTES - 1ST 30 MINS LEVEL 2: Performed by: ORTHOPAEDIC SURGERY

## 2021-09-30 PROCEDURE — 96367 TX/PROPH/DG ADDL SEQ IV INF: CPT

## 2021-09-30 PROCEDURE — 28820 AMPUTATION OF TOE: CPT | Mod: T2 | Performed by: ORTHOPAEDIC SURGERY

## 2021-09-30 PROCEDURE — 87015 SPECIMEN INFECT AGNT CONCNTJ: CPT

## 2021-09-30 PROCEDURE — 85652 RBC SED RATE AUTOMATED: CPT

## 2021-09-30 PROCEDURE — 99223 1ST HOSP IP/OBS HIGH 75: CPT | Mod: AI | Performed by: STUDENT IN AN ORGANIZED HEALTH CARE EDUCATION/TRAINING PROGRAM

## 2021-09-30 PROCEDURE — 700102 HCHG RX REV CODE 250 W/ 637 OVERRIDE(OP): Performed by: STUDENT IN AN ORGANIZED HEALTH CARE EDUCATION/TRAINING PROGRAM

## 2021-09-30 PROCEDURE — 36415 COLL VENOUS BLD VENIPUNCTURE: CPT

## 2021-09-30 PROCEDURE — 700101 HCHG RX REV CODE 250: Performed by: STUDENT IN AN ORGANIZED HEALTH CARE EDUCATION/TRAINING PROGRAM

## 2021-09-30 PROCEDURE — 700105 HCHG RX REV CODE 258: Performed by: EMERGENCY MEDICINE

## 2021-09-30 PROCEDURE — 700111 HCHG RX REV CODE 636 W/ 250 OVERRIDE (IP): Performed by: STUDENT IN AN ORGANIZED HEALTH CARE EDUCATION/TRAINING PROGRAM

## 2021-09-30 PROCEDURE — 96372 THER/PROPH/DIAG INJ SC/IM: CPT

## 2021-09-30 PROCEDURE — 86140 C-REACTIVE PROTEIN: CPT

## 2021-09-30 PROCEDURE — 99291 CRITICAL CARE FIRST HOUR: CPT

## 2021-09-30 PROCEDURE — 700105 HCHG RX REV CODE 258: Performed by: ANESTHESIOLOGY

## 2021-09-30 PROCEDURE — 88311 DECALCIFY TISSUE: CPT

## 2021-09-30 PROCEDURE — 700111 HCHG RX REV CODE 636 W/ 250 OVERRIDE (IP): Performed by: ANESTHESIOLOGY

## 2021-09-30 PROCEDURE — 88305 TISSUE EXAM BY PATHOLOGIST: CPT

## 2021-09-30 PROCEDURE — 84100 ASSAY OF PHOSPHORUS: CPT

## 2021-09-30 PROCEDURE — 770001 HCHG ROOM/CARE - MED/SURG/GYN PRIV*

## 2021-09-30 PROCEDURE — 700101 HCHG RX REV CODE 250: Performed by: NURSE PRACTITIONER

## 2021-09-30 PROCEDURE — 96366 THER/PROPH/DIAG IV INF ADDON: CPT

## 2021-09-30 PROCEDURE — 96365 THER/PROPH/DIAG IV INF INIT: CPT

## 2021-09-30 PROCEDURE — 81001 URINALYSIS AUTO W/SCOPE: CPT

## 2021-09-30 PROCEDURE — 82962 GLUCOSE BLOOD TEST: CPT

## 2021-09-30 PROCEDURE — 501838 HCHG SUTURE GENERAL: Performed by: ORTHOPAEDIC SURGERY

## 2021-09-30 PROCEDURE — 73630 X-RAY EXAM OF FOOT: CPT | Mod: LT

## 2021-09-30 PROCEDURE — 160009 HCHG ANES TIME/MIN: Performed by: ORTHOPAEDIC SURGERY

## 2021-09-30 PROCEDURE — 160002 HCHG RECOVERY MINUTES (STAT): Performed by: ORTHOPAEDIC SURGERY

## 2021-09-30 PROCEDURE — 85025 COMPLETE CBC W/AUTO DIFF WBC: CPT

## 2021-09-30 PROCEDURE — 85610 PROTHROMBIN TIME: CPT

## 2021-09-30 PROCEDURE — 87205 SMEAR GRAM STAIN: CPT

## 2021-09-30 PROCEDURE — 83605 ASSAY OF LACTIC ACID: CPT

## 2021-09-30 PROCEDURE — 87186 SC STD MICRODIL/AGAR DIL: CPT

## 2021-09-30 PROCEDURE — 93005 ELECTROCARDIOGRAM TRACING: CPT | Performed by: STUDENT IN AN ORGANIZED HEALTH CARE EDUCATION/TRAINING PROGRAM

## 2021-09-30 PROCEDURE — 83735 ASSAY OF MAGNESIUM: CPT

## 2021-09-30 PROCEDURE — 84145 PROCALCITONIN (PCT): CPT

## 2021-09-30 PROCEDURE — 80053 COMPREHEN METABOLIC PANEL: CPT

## 2021-09-30 PROCEDURE — 87040 BLOOD CULTURE FOR BACTERIA: CPT | Mod: 91

## 2021-09-30 PROCEDURE — 87077 CULTURE AEROBIC IDENTIFY: CPT

## 2021-09-30 PROCEDURE — A9270 NON-COVERED ITEM OR SERVICE: HCPCS | Performed by: STUDENT IN AN ORGANIZED HEALTH CARE EDUCATION/TRAINING PROGRAM

## 2021-09-30 RX ORDER — DEXTROSE MONOHYDRATE 25 G/50ML
50 INJECTION, SOLUTION INTRAVENOUS
Status: DISCONTINUED | OUTPATIENT
Start: 2021-09-30 | End: 2021-10-03

## 2021-09-30 RX ORDER — ATORVASTATIN CALCIUM 40 MG/1
80 TABLET, FILM COATED ORAL NIGHTLY
Status: DISCONTINUED | OUTPATIENT
Start: 2021-09-30 | End: 2021-10-04 | Stop reason: HOSPADM

## 2021-09-30 RX ORDER — AMOXICILLIN 250 MG
2 CAPSULE ORAL 2 TIMES DAILY
Status: DISCONTINUED | OUTPATIENT
Start: 2021-09-30 | End: 2021-10-03

## 2021-09-30 RX ORDER — POLYETHYLENE GLYCOL 3350 17 G/17G
1 POWDER, FOR SOLUTION ORAL
Status: DISCONTINUED | OUTPATIENT
Start: 2021-09-30 | End: 2021-10-03

## 2021-09-30 RX ORDER — BISACODYL 10 MG
10 SUPPOSITORY, RECTAL RECTAL
Status: DISCONTINUED | OUTPATIENT
Start: 2021-09-30 | End: 2021-10-03

## 2021-09-30 RX ORDER — SODIUM CHLORIDE, SODIUM LACTATE, POTASSIUM CHLORIDE, AND CALCIUM CHLORIDE .6; .31; .03; .02 G/100ML; G/100ML; G/100ML; G/100ML
1000 INJECTION, SOLUTION INTRAVENOUS
Status: DISCONTINUED | OUTPATIENT
Start: 2021-09-30 | End: 2021-10-04 | Stop reason: HOSPADM

## 2021-09-30 RX ORDER — ACETAMINOPHEN 325 MG/1
650 TABLET ORAL EVERY 6 HOURS PRN
Status: DISCONTINUED | OUTPATIENT
Start: 2021-09-30 | End: 2021-10-04 | Stop reason: HOSPADM

## 2021-09-30 RX ORDER — SODIUM CHLORIDE, SODIUM LACTATE, POTASSIUM CHLORIDE, CALCIUM CHLORIDE 600; 310; 30; 20 MG/100ML; MG/100ML; MG/100ML; MG/100ML
INJECTION, SOLUTION INTRAVENOUS
Status: DISCONTINUED | OUTPATIENT
Start: 2021-09-30 | End: 2021-09-30 | Stop reason: SURG

## 2021-09-30 RX ORDER — HEPARIN SODIUM 5000 [USP'U]/ML
5000 INJECTION, SOLUTION INTRAVENOUS; SUBCUTANEOUS EVERY 8 HOURS
Status: DISCONTINUED | OUTPATIENT
Start: 2021-09-30 | End: 2021-10-04 | Stop reason: HOSPADM

## 2021-09-30 RX ORDER — OMEPRAZOLE 20 MG/1
20 CAPSULE, DELAYED RELEASE ORAL EVERY MORNING
Status: DISCONTINUED | OUTPATIENT
Start: 2021-09-30 | End: 2021-10-04 | Stop reason: HOSPADM

## 2021-09-30 RX ORDER — HALOPERIDOL 5 MG/ML
1 INJECTION INTRAMUSCULAR
Status: DISCONTINUED | OUTPATIENT
Start: 2021-09-30 | End: 2021-09-30 | Stop reason: HOSPADM

## 2021-09-30 RX ORDER — ONDANSETRON 2 MG/ML
INJECTION INTRAMUSCULAR; INTRAVENOUS PRN
Status: DISCONTINUED | OUTPATIENT
Start: 2021-09-30 | End: 2021-09-30 | Stop reason: SURG

## 2021-09-30 RX ORDER — CARVEDILOL 6.25 MG/1
6.25 TABLET ORAL 2 TIMES DAILY
Status: DISCONTINUED | OUTPATIENT
Start: 2021-09-30 | End: 2021-10-04 | Stop reason: HOSPADM

## 2021-09-30 RX ORDER — METOCLOPRAMIDE HYDROCHLORIDE 5 MG/ML
INJECTION INTRAMUSCULAR; INTRAVENOUS PRN
Status: DISCONTINUED | OUTPATIENT
Start: 2021-09-30 | End: 2021-09-30 | Stop reason: SURG

## 2021-09-30 RX ORDER — CHOLESTYRAMINE LIGHT 4 G/5.7G
4 POWDER, FOR SUSPENSION ORAL 2 TIMES DAILY
Status: DISCONTINUED | OUTPATIENT
Start: 2021-09-30 | End: 2021-10-04 | Stop reason: HOSPADM

## 2021-09-30 RX ORDER — GABAPENTIN 100 MG/1
100 CAPSULE ORAL
Status: DISCONTINUED | OUTPATIENT
Start: 2021-09-30 | End: 2021-10-04 | Stop reason: HOSPADM

## 2021-09-30 RX ORDER — ONDANSETRON 2 MG/ML
4 INJECTION INTRAMUSCULAR; INTRAVENOUS
Status: DISCONTINUED | OUTPATIENT
Start: 2021-09-30 | End: 2021-09-30 | Stop reason: HOSPADM

## 2021-09-30 RX ORDER — SODIUM CHLORIDE, SODIUM LACTATE, POTASSIUM CHLORIDE, AND CALCIUM CHLORIDE .6; .31; .03; .02 G/100ML; G/100ML; G/100ML; G/100ML
30 INJECTION, SOLUTION INTRAVENOUS ONCE
Status: COMPLETED | OUTPATIENT
Start: 2021-09-30 | End: 2021-09-30

## 2021-09-30 RX ORDER — INSULIN LISPRO 100 [IU]/ML
1-6 INJECTION, SOLUTION INTRAVENOUS; SUBCUTANEOUS
Status: DISCONTINUED | OUTPATIENT
Start: 2021-09-30 | End: 2021-10-03

## 2021-09-30 RX ORDER — EMPAGLIFLOZIN 10 MG/1
10 TABLET, FILM COATED ORAL DAILY
COMMUNITY
End: 2022-03-11 | Stop reason: SINTOL

## 2021-09-30 RX ORDER — LIDOCAINE HYDROCHLORIDE 10 MG/ML
INJECTION, SOLUTION EPIDURAL; INFILTRATION; INTRACAUDAL; PERINEURAL PRN
Status: DISCONTINUED | OUTPATIENT
Start: 2021-09-30 | End: 2021-09-30 | Stop reason: SURG

## 2021-09-30 RX ORDER — DIPHENHYDRAMINE HYDROCHLORIDE 50 MG/ML
12.5 INJECTION INTRAMUSCULAR; INTRAVENOUS
Status: DISCONTINUED | OUTPATIENT
Start: 2021-09-30 | End: 2021-09-30 | Stop reason: HOSPADM

## 2021-09-30 RX ADMIN — CHOLESTYRAMINE 4 G: 4 POWDER, FOR SUSPENSION ORAL at 21:38

## 2021-09-30 RX ADMIN — ATORVASTATIN CALCIUM 80 MG: 40 TABLET, FILM COATED ORAL at 20:32

## 2021-09-30 RX ADMIN — VANCOMYCIN HYDROCHLORIDE 2500 MG: 500 INJECTION, POWDER, LYOPHILIZED, FOR SOLUTION INTRAVENOUS at 06:54

## 2021-09-30 RX ADMIN — ASPIRIN 81 MG: 81 TABLET, COATED ORAL at 07:42

## 2021-09-30 RX ADMIN — SODIUM CHLORIDE, POTASSIUM CHLORIDE, SODIUM LACTATE AND CALCIUM CHLORIDE: 600; 310; 30; 20 INJECTION, SOLUTION INTRAVENOUS at 13:51

## 2021-09-30 RX ADMIN — PIPERACILLIN AND TAZOBACTAM 3.38 G: 3; .375 INJECTION, POWDER, LYOPHILIZED, FOR SOLUTION INTRAVENOUS; PARENTERAL at 05:42

## 2021-09-30 RX ADMIN — INSULIN GLARGINE 10 UNITS: 100 INJECTION, SOLUTION SUBCUTANEOUS at 18:36

## 2021-09-30 RX ADMIN — SODIUM CHLORIDE, POTASSIUM CHLORIDE, SODIUM LACTATE AND CALCIUM CHLORIDE 1000 ML: 600; 310; 30; 20 INJECTION, SOLUTION INTRAVENOUS at 08:46

## 2021-09-30 RX ADMIN — CARVEDILOL 6.25 MG: 6.25 TABLET, FILM COATED ORAL at 20:31

## 2021-09-30 RX ADMIN — PIPERACILLIN AND TAZOBACTAM 3.38 G: 3; .375 INJECTION, POWDER, LYOPHILIZED, FOR SOLUTION INTRAVENOUS; PARENTERAL at 21:39

## 2021-09-30 RX ADMIN — HEPARIN SODIUM 5000 UNITS: 5000 INJECTION, SOLUTION INTRAVENOUS; SUBCUTANEOUS at 07:42

## 2021-09-30 RX ADMIN — HEPARIN SODIUM 5000 UNITS: 5000 INJECTION, SOLUTION INTRAVENOUS; SUBCUTANEOUS at 21:38

## 2021-09-30 RX ADMIN — ONDANSETRON 4 MG: 2 INJECTION INTRAMUSCULAR; INTRAVENOUS at 14:07

## 2021-09-30 RX ADMIN — LIDOCAINE HYDROCHLORIDE 50 MG: 10 INJECTION, SOLUTION EPIDURAL; INFILTRATION; INTRACAUDAL; PERINEURAL at 13:55

## 2021-09-30 RX ADMIN — PROPOFOL 200 MG: 10 INJECTION, EMULSION INTRAVENOUS at 13:55

## 2021-09-30 RX ADMIN — OMEPRAZOLE 20 MG: 20 CAPSULE, DELAYED RELEASE ORAL at 07:42

## 2021-09-30 RX ADMIN — METOCLOPRAMIDE 20 MG: 5 INJECTION, SOLUTION INTRAMUSCULAR; INTRAVENOUS at 14:07

## 2021-09-30 RX ADMIN — CARVEDILOL 6.25 MG: 6.25 TABLET, FILM COATED ORAL at 08:44

## 2021-09-30 RX ADMIN — GABAPENTIN 100 MG: 100 CAPSULE ORAL at 20:31

## 2021-09-30 RX ADMIN — VANCOMYCIN HYDROCHLORIDE 1250 MG: 500 INJECTION, POWDER, LYOPHILIZED, FOR SOLUTION INTRAVENOUS at 21:40

## 2021-09-30 RX ADMIN — PIPERACILLIN AND TAZOBACTAM 3.38 G: 3; .375 INJECTION, POWDER, LYOPHILIZED, FOR SOLUTION INTRAVENOUS; PARENTERAL at 19:31

## 2021-09-30 ASSESSMENT — ENCOUNTER SYMPTOMS
CHILLS: 0
MYALGIAS: 0
SHORTNESS OF BREATH: 0
BLURRED VISION: 0
DEPRESSION: 0
NAUSEA: 0
MEMORY LOSS: 0
DOUBLE VISION: 0
VOMITING: 0
BRUISES/BLEEDS EASILY: 0
ABDOMINAL PAIN: 0
DIZZINESS: 0
NERVOUS/ANXIOUS: 0
WEAKNESS: 0
COUGH: 0
SORE THROAT: 0
HEADACHES: 0
FEVER: 1
CONSTIPATION: 0
PALPITATIONS: 0
DIARRHEA: 1

## 2021-09-30 ASSESSMENT — PAIN DESCRIPTION - PAIN TYPE
TYPE: SURGICAL PAIN

## 2021-09-30 ASSESSMENT — FIBROSIS 4 INDEX: FIB4 SCORE: 1.27

## 2021-09-30 NOTE — CONSULTS
LIMB PRESERVATION SERVICE CONSULT      REFERRED BY: Dr. Bay    DATE OF CONSULTATION: 9/30/2021    REASON FOR CONSULT: Left foot DFU    HISTORY OF PRESENT ILLNESS: Beni Moore is a 51 y.o.  with a past medical history that includes type 2 diabetes, neuropathy, history of right TMA, HTN, HLD, CAD with history of NSTEMI, Hx COVID19, admitted 9/30/2021 for Osteomyelitis (HCC) [M86.9].   LPS has been consulted for L 3rd toe wound.    Patient has long history of DM and neuropathy with history of right TMA and right heel burn which has healed. Patient thinks he fractured left 3rd toe 1 month ago and noticed dorsal toe wound starting 2 weeks ago. He has been performing wound care at home with silver and colloid dressings. He noted worsening swelling, redness, and found odor with discharge over past 2 days. Patient was performing his own debridement at home when he noted bone in wound and came to ED. Patient was seen in wound care clinic for partial thickness burn to right heel which has subsequently resolved.     IV antibiotics were started on this admission.  Infectious diseases has not been consulted.    Xray completed and is positive for osteomyelitis.  Ortho has not been consulted yet.    Diagnosed with diabetes 24 years ago, and is currently managing with oral medications and insulin.  Checks blood sugars few times per day and reports that these typically average 90-160s.  Has had previous diabetes education.  Does have numbness to feet.  Checks feet routinely. Usually wears diabetic insoles . Does have inserts from  that are less than 1 years old.  Has had previous foot surgeries including R TMA.  Current occupation disability.         Smoking:   reports that he has never smoked. He has never used smokeless tobacco.    Alcohol:   reports no history of alcohol use.    Drug:   reports current drug use.      PAST MEDICAL HISTORY:   Past Medical History:   Diagnosis Date   • Diabetes (HCC)    • Hx of heart  artery stent 2019   • Hyperlipidemia    • Hypertension         PAST SURGICAL HISTORY:   Past Surgical History:   Procedure Laterality Date   • STENT PLACEMENT  2019    STEMI with nonobstructive LAD   • TOE AMPUTATION Right 7/2/2018    Procedure: Transmetatarsal amputation;  Surgeon: Ravi Bernardo M.D.;  Location: SURGERY [facility];  Service: Orthopedics   • ACHILLES TENDON REPAIR Right 7/2/2018    Procedure: ACHILLES LENGTHENING;  Surgeon: Ravi Bernardo M.D.;  Location: SURGERY [facility];  Service: Orthopedics   • IRRIGATION & DEBRIDEMENT ORTHO Right 7/2/2018    Procedure: IRRIGATION & DEBRIDEMENT ORTHO;  Surgeon: Ravi Bernardo M.D.;  Location: SURGERY [facility];  Service: Orthopedics   • OTHER     • OTHER ABDOMINAL SURGERY     • OTHER ORTHOPEDIC SURGERY     • TONSILLECTOMY     • VASECTOMY         MEDICATIONS:   Current Facility-Administered Medications   Medication Dose   • aspirin EC (ECOTRIN) tablet 81 mg  81 mg   • atorvastatin (LIPITOR) tablet 80 mg  80 mg   • carvedilol (COREG) tablet 6.25 mg  6.25 mg   • cholestyramine (QUESTRAN,PREVALITE) 4 GM powder 4 g  4 g   • gabapentin (NEURONTIN) capsule 100 mg  100 mg   • omeprazole (PRILOSEC) capsule 20 mg  20 mg   • acetaminophen (Tylenol) tablet 650 mg  650 mg   • senna-docusate (PERICOLACE or SENOKOT S) 8.6-50 MG per tablet 2 Tablet  2 Tablet    And   • polyethylene glycol/lytes (MIRALAX) PACKET 1 Packet  1 Packet    And   • magnesium hydroxide (MILK OF MAGNESIA) suspension 30 mL  30 mL    And   • bisacodyl (DULCOLAX) suppository 10 mg  10 mg   • insulin glargine (Semglee) injection  10 Units    And   • insulin lispro (AdmeLOG) injection  1-6 Units    And   • glucose 4 g chewable tablet 16 g  16 g    And   • dextrose 50% (D50W) injection 50 mL  50 mL   • heparin injection 5,000 Units  5,000 Units   • lactated ringers infusion (BOLUS)  1,000 mL     Current Outpatient Medications   Medication   • Empagliflozin (JARDIANCE) 10 MG Tab   •  carvedilol (COREG) 6.25 MG Tab   • aspirin 81 MG EC tablet   • cholestyramine (QUESTRAN) 4 GM/DOSE powder   • gabapentin (NEURONTIN) 100 MG Cap   • insulin glargine (LANTUS) 100 UNIT/ML Solution   • atorvastatin (LIPITOR) 80 MG tablet   • omeprazole (PRILOSEC) 20 MG delayed-release capsule       ALLERGIES:  No Known Allergies     FAMILY HISTORY:   Family History   Problem Relation Age of Onset   • No Known Problems Mother    • Diabetes Father    • Heart Disease Father    • Diabetes Maternal Grandmother    • Heart Disease Maternal Grandfather    • Lung Disease Paternal Grandmother    • Cancer Paternal Grandmother    • Cancer Paternal Grandfather    • Lung Cancer Paternal Grandfather          REVIEW OF SYSTEMS:   Constitutional: Negative for chills, fever   Respiratory: Negative for cough and shortness of breath.    Cardiovascular:Negative for chest pain, and claudication.   Gastrointestinal: Negative for constipation, diarrhea, nausea and vomiting.   Lower extremities: positive for swelling and redness  Neurological: positive for numbness to feet and lower legs  All other systems reviewed and are negative     RESULTS:     Recent Labs     09/30/21  0458   WBC 9.1   RBC 4.05*   HEMOGLOBIN 11.8*   HEMATOCRIT 35.7*   MCV 88.1   MCH 29.1   MCHC 33.1*   RDW 45.1   PLATELETCT 311   MPV 10.0     Recent Labs     09/30/21  0458   SODIUM 135   POTASSIUM 3.9   CHLORIDE 102   CO2 21   GLUCOSE 220*   BUN 15         ESR:     Results from last 7 days   Lab Units 09/30/21  0458   SED RATE WESTERGREN 1526 mm/hour 55*       CRP:       Results from last 7 days   Lab Units 09/30/21  0458   C REACTIVE PROTEIN 4596 mg/dL 1.08*         COVID-19: Not completed this admission    X-ray:  9/30: Osseous destruction involving the left third mid and distal phalanges, in keeping with osteomyelitis.    MRI: NA    Arterial studies: NA    A1c:  Lab Results   Component Value Date/Time    HBA1C 7.8 (H) 06/28/2021 08:27 AM     "        Microbiology:  Results     Procedure Component Value Units Date/Time    MRSA By PCR (Amp) [[id number]] Collected: 09/30/21 0727    Order Status: Completed Specimen: Respirate from Nares Updated: 09/30/21 0827    CULTURE WOUND W/ GRAM STAIN [[id number]]     Order Status: Sent Specimen: Wound from Left Foot     Blood Culture [[id number]]     Order Status: Sent Specimen: Blood from Peripheral     Blood Culture [[id number]]     Order Status: Sent Specimen: Blood from Peripheral     Urinalysis [[id number]]     Order Status: Canceled Specimen: Urine, Clean Catch     Blood Culture [[id number]] Collected: 09/30/21 0518    Order Status: Completed Specimen: Blood from Peripheral Updated: 09/30/21 0647    Narrative:      From different peripheral sites, if not done within the last  24 hours (Per Hospital Policy: Only change specimen source to  \"Line\" if specified by physician order)    Blood Culture [[id number]] Collected: 09/30/21 0505    Order Status: Completed Specimen: Blood from Peripheral Updated: 09/30/21 0637    Narrative:      From different peripheral sites, if not done within the last  24 hours (Per Hospital Policy: Only change specimen source to  \"Line\" if specified by physician order)    Urinalysis [[id number]]     Order Status: Sent Specimen: Urine, Clean Catch            PHYSICAL EXAMINATION:     VITAL SIGNS: /87   Pulse 96   Temp 38 °C (100.4 °F) (Oral)   Resp (!) 22   Wt 100 kg (221 lb 5.5 oz)   SpO2 97%   BMI 30.02 kg/m²       General Appearance:  Well developed, obese, in no acute distress    Lower Extremity Assessment:    Edema:   Left foot edema      ROM dorsi/plantarflexion   Dorsiflexion intact    Structural /mechanical changes:   R TMA  L flexible hammer toes 2ng and 4th toes    Sensory Assessment  Feet Insensate to light touch    Pulses:  R foot: +2 DP, +2 PT  L foot: +2 DP, +2 PT      Wound Assessment:    Left 3rd toe dorsal DFU  Grossly infected  Bone destruction on examination and probes " to bone fragments  Erythema: Left forefoot, periwound  Drainage: purulent  Callus: None  Odor: Maloderous    1.4x1.7x1.1cm        Right anterior tibia abrasion  Abrasion from ladder rung  Superficial wound  [place] bed with some slough  No evidence of infection  2.5x1.2x0.1cm        Left Heel  partial thickness burn from 7/2021  Healed and resolved          ASSESSMENT AND PLAN:     51M with pmhx of long standing DM with neuropathy, history of right sided TMA. Presents with infected diabetic foot ulcer to dorsal L 3rd toe with osteomyelitis.    - Left 3rd toe with clinical and imaging confirming osteomyelitis and infection. Consulted ortho Dr. Hart and plan for amputation this afternoon.  - Had palpable pulses and appears to have adequate blood flow. Defer vascular studies.  - Has abrasions right anterior shin without infection. Will treat with wound care  - Previous heel burn has resolved    Wound care:   -Wound care orders placed for nursing  - Right tibial wound: Hydrofera blue and attach with hypafix tape    Imaging/Labs:  -COVID-19: not completed, covid recovered    Vascular status:   -Palpable pedal pulses bilaterally  - Do not think he needs further vascular workup at this time    Surgery:   - Dr. Hart consulted for left 3rd toe amputation, plan for this afternoon    Antibiotics:   -currently on antibiotics managed by hospitalist, defer to medicine post amputation    Weight Bearing Status:   -Weight bearing as tolerated    Offloading:   -Offloading shoe; ordered  -Orthotic company:     PT/OT:   -Patient ambulatory, not indicated at this time    Diabetes Education:   - consult CDE and RD.     - Implications of loss of protective sensation (LOPS) discussed with patient- including increased risk for amputation.  Advised to check feet at least daily, moisturize feet, and to always wear protective foot wear.   -avoid trimming own nails. See podiatrist or certified foot and nail RN  -keep blood sugars <150  for improved wound healing      Discharge Plan:  Patient planning on surgery today  Plan for likely home in next few days depending on surgical intervention    Follow up: suspect he will not need wound care clinic follow up  Follow up: LPS rounds not indicated    D/W: pt, RN, Dr. Hart, Dr. Lechuga    Please note that this dictation was created using voice recognition software. I have  worked with technical experts from [facility] to optimize the interface.  I have made every reasonable attempt to correct obvious errors, but there may be errors of grammar and possibly content that I did not discover before finalizing the note.    Please contact [facility] through [employer].

## 2021-09-30 NOTE — ED TRIAGE NOTES
"Beni Moore  51 y.o. M  Chief Complaint   Patient presents with   • Wound Check     Patient reports he has an infected middle toe that started as a cut but now is \"smelling so bad.\" Patient has a history of diabetes with r sided toe amputations. Patient reports his blood sugar was running high last month after taking steroids for Covid.      Blood Pressure 141/99   Pulse (Abnormal) 105   Temperature 38 °C (100.4 °F) (Oral)   Respiration 16   Weight 100 kg (221 lb 5.5 oz)   Oxygen Saturation 95%   Body Mass Index 30.02 kg/m²     Triage process explained to patient, apologized for wait time, and returned to lobby.  Pt informed to notify staff of any change in condition.     "

## 2021-09-30 NOTE — OR NURSING
Pre-op assessment complete, VSS stable, pt updated on POC. Call light within reach. Denies needs at this time.

## 2021-09-30 NOTE — OP REPORT
DATE OF OPERATION: 9/30/2021     PREOPERATIVE DIAGNOSIS: Left third toe osteomyelitis    POSTOPERATIVE DIAGNOSIS: Same    PROCEDURE PERFORMED: left third toe amputation through MTP    SURGEON: Tomer Hart M.D.    ANESTHESIOLOGIST: Gurpreet Patton MD.     ANESTHESIA: General    INDICATIONS: The patient is a 51 y.o.-year-old male who presents with a dysvascular lower extremity with signs of third toe osteomyelitis, with drainage for 2 weeks.  Given their radiographic and exam findings, the patient is an appropriately indicated candidate for left third toe amputation at the MTP.  I discussed the risks, benefits, and alternatives of surgery with the patient.  Risks discussed included failure to clear the infection, wound healing complication, need for additional surgery including more proximal amputation, neurovascular injury, blood loss, DVT/PE, and the medical risks of anesthesia (including stroke, MI, and death).  Benefits discussed included improved chance of clearance of the infection and improved function.  The patient is in agreement with the plan to proceed, and their informed consent was signed and documented in the medical record.  I met with the patient pre-operatively and marked the operative extremity with their agreement.  He was taken to the operating room.    FINDINGS: Non-viable lower extremity    WOUND CLASSIFICATION: Class III    SPECIMEN: None    ESTIMATED BLOOD LOSS: Minimal    COMPLICATIONS: None    TOURNIQUET TIME: N/A    PROCEDURE: The patient underwent general anesthesia, and was positioned supine with all bony prominences well padded.  The operative extremity was prepped and draped in sterile orthopaedic fashion, using ChloraPrep.  The surgical team scrubbed in, and a procedural pause was conducted to verify correct patient, correct extremity, presence of the surgeons initials on the operative extremity, and administration of IV antibiotics, in this case, Ancef.    Following generalized  agreement, a teardrop incision was made at the base of the third toe.  The third toe was sharply disarticulated.  There were no signs of infection at the MTP joint where as there was purulent drainage from the PIP joint.  Hemostasis was obtained.  Margin cultures were obtained from the distal aspect of the third metatarsal.  We then thoroughly irrigated and closed with 2-0 Monocryl and 3-0 nylon suture.  Sterile dressings were applied.  Patient was transferred to the recovery room in stable condition, sustaining no complications.    Post-Operative Plan:    1.  Heel weightbearing as tolerated on operative extremity  2.  DVT prophylaxis - SCD's, chemical per medicine  3.  Antibiotics - Per medicine racks, none required after perioperative antibiotics complete from an orthopedic perspective as the amputation occurred proximal to the zone of infection.    4.  Discharge planning     ____________________________________   Tomer Hart M.D.    DD: 9/30/2021  2:25 PM

## 2021-09-30 NOTE — ANESTHESIA PREPROCEDURE EVALUATION
Relevant Problems   PULMONARY   (positive) History of COVID-19      NEURO   (positive) History of COVID-19   (positive) History of non-ST elevation myocardial infarction (NSTEMI)      CARDIAC   (positive) Coronary artery disease involving native coronary artery of native heart without angina pectoris   (positive) Essential hypertension   (positive) NSTEMI (non-ST elevated myocardial infarction) (HCC)   (positive) Presence of stent in coronary artery         (positive) AXEL (acute kidney injury) (HCC)      ENDO   (positive) Controlled type 2 diabetes mellitus, with long-term current use of insulin (HCC)      Other   (positive) Osteomyelitis of left foot (HCC)       Physical Exam    Airway   Mallampati: II  TM distance: >3 FB  Neck ROM: full       Cardiovascular - normal exam  Rhythm: regular  Rate: normal  (-) murmur     Dental - normal exam        Facial Hair   Pulmonary - normal exam  Breath sounds clear to auscultation     Abdominal    Neurological - normal exam                 Anesthesia Plan    ASA 3   ASA physical status 3 criteria: diabetes - poorly controlled    Plan - general       Airway plan will be LMA          Induction: intravenous      Pertinent diagnostic labs and testing reviewed    Informed Consent:    Anesthetic plan and risks discussed with patient.

## 2021-09-30 NOTE — ANESTHESIA POSTPROCEDURE EVALUATION
Patient: Beni Moore    Procedure Summary     Date: 09/30/21 Room / Location: [place] [address] / SURGERY [facility]    Anesthesia Start: 1351 Anesthesia Stop: 1426    Procedure: AMPUTATION, TOE (Left Third Toe) Diagnosis: (osteomyelitis left third toe)    Surgeons: Tomer Hart M.D. Responsible Provider: Gurpreet Patton M.D.    Anesthesia Type: general ASA Status: 3          Final Anesthesia Type: general  Last vitals  BP   Blood Pressure: 125/64    Temp   36.6 °C (97.8 °F)    Pulse   95   Resp   14    SpO2   94 %      Anesthesia Post Evaluation    Patient location during evaluation: PACU  Patient participation: complete - patient participated  Level of consciousness: awake and alert    Airway patency: patent  Anesthetic complications: no  Cardiovascular status: hemodynamically stable  Respiratory status: acceptable  Hydration status: euvolemic    PONV: none          There were no known complications for this encounter.     Nurse Pain Score: 0 (NPRS)

## 2021-09-30 NOTE — ANESTHESIA TIME REPORT
Anesthesia Start and Stop Event Times     Date Time Event    9/30/2021 1302 Ready for Procedure     1351 Anesthesia Start     1426 Anesthesia Stop        Responsible Staff  09/30/21    Name Role Begin End    Gurpreet Patton M.D. Anesth 1351 1426        Preop Diagnosis (Free Text):  Pre-op Diagnosis     osteomyelitis left third toe        Preop Diagnosis (Codes):    Premium Reason  Non-Premium    Comments:

## 2021-09-30 NOTE — CONSULTS
9/30/2021    Reason for consultation: Left foot infection    Consultation on Beni Moore at the request of Dr. Combs for a left foot infection.  The patient is a 51 y.o. male who presents with a left diabetic foot infection. He noticed a wound and some drainage from his third toe about 2 weeks ago.  He then noticed the bone sticking through his skin and continued drainage.  As such he presented for evaluation.  He denies feeling ill or other complaints. He thinks he may have broken the toe about a month ago.  They were evaluated in the ER, and Orthopedics was consulted. Patient denies numbness, paresthesias, loss of consciousness or other symptoms.    Past Medical History:   Diagnosis Date   • Diabetes (HCC)    • Hx of heart artery stent 2019   • Hyperlipidemia    • Hypertension        Past Surgical History:   Procedure Laterality Date   • STENT PLACEMENT  2019    STEMI with nonobstructive LAD   • TOE AMPUTATION Right 7/2/2018    Procedure: Transmetatarsal amputation;  Surgeon: Ravi Bernardo M.D.;  Location: SURGERY [facility];  Service: Orthopedics   • ACHILLES TENDON REPAIR Right 7/2/2018    Procedure: ACHILLES LENGTHENING;  Surgeon: Ravi Bernardo M.D.;  Location: SURGERY [facility];  Service: Orthopedics   • IRRIGATION & DEBRIDEMENT ORTHO Right 7/2/2018    Procedure: IRRIGATION & DEBRIDEMENT ORTHO;  Surgeon: Ravi Bernardo M.D.;  Location: SURGERY [facility];  Service: Orthopedics   • OTHER     • OTHER ABDOMINAL SURGERY     • OTHER ORTHOPEDIC SURGERY     • TONSILLECTOMY     • VASECTOMY         Medications  No current facility-administered medications on file prior to encounter.     Current Outpatient Medications on File Prior to Encounter   Medication Sig Dispense Refill   • Empagliflozin (JARDIANCE) 10 MG Tab Take 10 mg by mouth every day.     • carvedilol (COREG) 6.25 MG Tab Take 1 Tablet by mouth 2 times a day. 180 Tablet 3   • aspirin 81 MG EC tablet Take 1 Tablet by mouth every  day. 90 Tablet 3   • cholestyramine (QUESTRAN) 4 GM/DOSE powder Take 4 g by mouth 2 times a day. Mixed with 4-6 oz water.  Pt takes at 1000 and 1700.   Hold all other PO medications an hour before and 4 hours after dosing.     • gabapentin (NEURONTIN) 100 MG Cap Take 100 mg by mouth at bedtime.     • insulin glargine (LANTUS) 100 UNIT/ML Solution Inject 10-20 Units under the skin every evening. Units change based upon elevated blood sugar     • atorvastatin (LIPITOR) 80 MG tablet Take 1 tablet by mouth every evening. 90 tablet 3   • omeprazole (PRILOSEC) 20 MG delayed-release capsule Take 20 mg by mouth every morning.         Allergies  Patient has no known allergies.    ROS  Per HPI. All other systems were reviewed and found to be negative    Family History   Problem Relation Age of Onset   • No Known Problems Mother    • Diabetes Father    • Heart Disease Father    • Diabetes Maternal Grandmother    • Heart Disease Maternal Grandfather    • Lung Disease Paternal Grandmother    • Cancer Paternal Grandmother    • Cancer Paternal Grandfather    • Lung Cancer Paternal Grandfather        Social History     Socioeconomic History   • Marital status:      Spouse name: Not on file   • Number of children: Not on file   • Years of education: Not on file   • Highest education level: 12th grade   Occupational History   • Not on file   Tobacco Use   • Smoking status: Never Smoker   • Smokeless tobacco: Never Used   Vaping Use   • Vaping Use: Never used   Substance and Sexual Activity   • Alcohol use: No   • Drug use: Yes     Comment: CBD Gummies   • Sexual activity: Not on file   Other Topics Concern   • Not on file   Social History Narrative   • Not on file     Social Determinants of Health     Financial Resource Strain: Low Risk    • Difficulty of Paying Living Expenses: Not hard at all   Food Insecurity: No Food Insecurity   • Worried About Running Out of Food in the Last Year: Never true   • Ran Out of Food in the  Last Year: Never true   Transportation Needs: No Transportation Needs   • Lack of Transportation (Medical): No   • Lack of Transportation (Non-Medical): No   Physical Activity: Insufficiently Active   • Days of Exercise per Week: 3 days   • Minutes of Exercise per Session: 30 min   Stress: No Stress Concern Present   • Feeling of Stress : Not at all   Social Connections: Moderately Isolated   • Frequency of Communication with Friends and Family: More than three times a week   • Frequency of Social Gatherings with Friends and Family: More than three times a week   • Attends Restoration Services: Never   • Active Member of Clubs or Organizations: No   • Attends Club or Organization Meetings: Never   • Marital Status:    Intimate Partner Violence:    • Fear of Current or Ex-Partner:    • Emotionally Abused:    • Physically Abused:    • Sexually Abused:        Physical Exam  Vitals  BP (!) 88/52   Pulse 92   Temp 36.5 °C (97.7 °F) (Temporal)   Resp 16   Wt 100 kg (221 lb 5.5 oz)   SpO2 99%   General: Well Developed, Well Nourished, no acute distress  Psychiatric: Alert and oriented x3, appropriate responses to questions, pleasant mood and affect.  HEENT: Normocephalic, atraumatic  Eyes: Anicteric, PERRL  Neck: Midline trachea, no pain with ROM  Chest: Symmetric expansion of the chest wall, non-tender to palpation, no distress.  Heart: RRR, palpable peripheral pulses  Abdomen: Soft, NT, ND  Skin: Open dorsal foot wound with necrotic bone and tissue and erythema.  Extremities: Mild deformity of the left third toe erythema about the left third toe similar dorsiflexion on the left compared to the right with the knee extended.  Neuro: Diminished light touch and station consistent with neuropathy, intact motor function tibialis anterior gastrocsoleus complex EHL peroneals.  Vascular: Palpable DP pulse on the left, Capillary refill <2 seconds    Radiographs:  DX-FOOT-COMPLETE 3+ LEFT   Final Result         Osseous  destruction involving the left third mid and distal phalanges, in keeping with osteomyelitis.          Laboratory Values  Recent Labs     09/30/21  0458   WBC 9.1   RBC 4.05*   HEMOGLOBIN 11.8*   HEMATOCRIT 35.7*   MCV 88.1   MCH 29.1   MCHC 33.1*   RDW 45.1   PLATELETCT 311   MPV 10.0     Recent Labs     09/30/21  0458   SODIUM 135   POTASSIUM 3.9   CHLORIDE 102   CO2 21   GLUCOSE 220*   BUN 15     Recent Labs     09/30/21  0458   INR 1.04         Impression:    #1  Left third toe osteomyelitis    Plan:    I recommended operative treatment of his left third toe osteomyelitis by amputation of the third toe at the MTP.. Risks and benefits of surgery were discussed which include, but are not limited to bleeding, infection, neurovascular damage, wound healing complication, need for additional surgery, DVT, PE, MI, Stroke and death.  Benefits of surgery discussed included improved chance of infection clearance and wound healing..  We also discussed therapeutic alternatives to surgery, including non-operative management, which I did not recommend.    They understand these risks and benefits and wish to proceed.      Please keep NPO pending surgery.     Post-operatively, he will be able to bear weight on his heel.  DVT prophylaxis per medicine.  Antibiotic recommendations per medicine.

## 2021-09-30 NOTE — OR NURSING
Patient recovered well in post-op. AAOx4. VSS, on 1L via NC. Surgical sites JENNIFER, surgical dressing in place CDI. LLE warm/pink, cap refill < 3 seconds, numb sensation (baseline per patient), L popliteal pulse 1+. Patient denies pain and nausea during recovery. Mom updated and discussed POC. Patient belongings retrieved and on [facility]. Report called to JULIANO Delaney. Awaiting transport.

## 2021-09-30 NOTE — ASSESSMENT & PLAN NOTE
This is Sepsis Present on admission  SIRS criteria identified on my evaluation include: Tachycardia, with heart rate greater than 90 BPM and Tachypnea, with respirations greater than 20 per minute  Source is left foot osteomyelitis  Sepsis protocol initiated  Fluid resuscitation ordered per protocol  IV antibiotics as appropriate for source of sepsis: Zosyn and vancomycin  While organ dysfunction may be noted elsewhere in this problem list or in the chart, degree of organ dysfunction does not meet CMS criteria for severe sepsis

## 2021-09-30 NOTE — ANESTHESIA PROCEDURE NOTES
Airway    Date/Time: 9/30/2021 1:55 PM  Performed by: Gurpreet Patton M.D.  Authorized by: Gurpreet Patton M.D.     Location:  OR  Urgency:  Elective  Indications for Airway Management:  Anesthesia      Spontaneous Ventilation: absent    Sedation Level:  Deep  Preoxygenated: Yes    Final Airway Type:  Supraglottic airway  Final Supraglottic Airway:  Standard LMA    SGA Size:  4  Number of Attempts at Approach:  1

## 2021-09-30 NOTE — ASSESSMENT & PLAN NOTE
Left third toe amputation through MTP. 9/30/2021  IV Zosyn.  Pain control, wound care  Follow pathology results

## 2021-09-30 NOTE — PROGRESS NOTES
[facility] [place] Security notified patient is being admitted overnight post-op. Discussed with , Theo Russell. Patient's family is able to come  truck tomorrow 10/01/2021.

## 2021-09-30 NOTE — H&P
"Hospital Medicine History & Physical Note    Date of Service  9/30/2021    Primary Care Physician  Florinda Pascual M.D.    Consultants      Code Status  Full Code    Chief Complaint  Chief Complaint   Patient presents with   • Wound Check     Patient reports he has an infected middle toe that started as a cut but now is \"smelling so bad.\" Patient has a history of diabetes with r sided toe amputations. Patient reports his blood sugar was running high last month after taking steroids for Covid.        History of Presenting Illness  Beni Moore is a 51 y.o. male who presented 9/30/2021 with draining left foot wound.  This is a pleasant gentleman with a history of diabetes mellitus type 2 with neuropathy, right total toe amputation, hypertension, hyperlipidemia, NSTEMI and CAD with nonobstructive proximal left LAD status post stenting in 2019 on aspirin and carvedilol, and COVID-19 infection 1.5 months ago.  Patient reports that he may have broken his toe approximately 1 month ago.  Approximately 2 weeks ago, that he had a cut on the top of his right third toe, which he did not initially noticed because he has no sensation in his feet.  Patient reports that the wound started draining and he started dressing it with his wound care supplies including collagen and silver-cream and wrappings.  Reports initially got better but then 2 days ago, started to get worse with increasing purulent and foul smelling drainage.  He denies any current pain.  At approximately 5 PM yesterday evening, he noticed a piece of bone fall out of the place of the wound and therefore decided to go to the ER for further evaluation.  Patient reports that he initially had his first amputation in his right great toe in [place], [place] in 2018.  He reports that his blood sugars were also high after taking steroids for COVID-19.  Reports resolution of his symptoms but has not received the COVID-19 vaccine yet.  He reports a fever on presentation " but denies any chills or fatigue.  He does have chronic diarrhea due to his autonomic dysfunction, which she has had for many years.  He denies any history of smoking but does use CBD Gummies for his neuropathy pain.  Denies any marijuana or illicit drug use.  Patient reports that he has previously seen Dr. Bernardo for his right partial foot amputation.    In the ER, patient was tachycardic with heart rate into the 90s to 100s with respiratory rate of up to 22 meeting criteria for sepsis.  X-ray showed signs of osteomyelitis of the left third toe.      I discussed the plan of care with patient and bedside RN.    Review of Systems  Review of Systems   Constitutional: Positive for fever. Negative for chills and malaise/fatigue.   HENT: Negative for ear pain and sore throat.    Eyes: Negative for blurred vision and double vision.   Respiratory: Negative for cough and shortness of breath.    Cardiovascular: Negative for chest pain and palpitations.   Gastrointestinal: Positive for diarrhea (Chronic). Negative for abdominal pain, constipation, nausea and vomiting.   Genitourinary: Negative for dysuria and frequency.   Musculoskeletal: Negative for joint pain and myalgias.   Skin: Negative for itching and rash.   Neurological: Negative for dizziness, weakness and headaches.   Endo/Heme/Allergies: Negative for environmental allergies. Does not bruise/bleed easily.   Psychiatric/Behavioral: Negative for depression and memory loss. The patient is not nervous/anxious.        Past Medical History   has a past medical history of Diabetes (HCC), heart artery stent (2019), Hyperlipidemia, and Hypertension.    Surgical History   has a past surgical history that includes other orthopedic surgery; other; toe amputation (Right, 7/2/2018); achilles tendon repair (Right, 7/2/2018); irrigation & debridement ortho (Right, 7/2/2018); vasectomy; tonsillectomy; other abdominal surgery; and stent placement (2019).     Family History  family  history includes Cancer in his paternal grandfather and paternal grandmother; Diabetes in his father and maternal grandmother; Heart Disease in his father and maternal grandfather; Lung Cancer in his paternal grandfather; Lung Disease in his paternal grandmother; No Known Problems in his mother.   Family history reviewed with patient. There is no family history that is pertinent to the chief complaint.     Social History   reports that he has never smoked. He has never used smokeless tobacco. He reports current drug use. He reports that he does not drink alcohol.    Allergies  No Known Allergies    Medications  Prior to Admission Medications   Prescriptions Last Dose Informant Patient Reported? Taking?   Empagliflozin (JARDIANCE) 10 MG Tab 9/29/2021 at am  Yes Yes   Sig: Take 10 mg by mouth every day.   aspirin 81 MG EC tablet 9/29/2021 at AM  No No   Sig: Take 1 Tablet by mouth every day.   atorvastatin (LIPITOR) 80 MG tablet 9/29/2021 at PM Patient No No   Sig: Take 1 tablet by mouth every evening.   carvedilol (COREG) 6.25 MG Tab 9/29/2021 at PM  No No   Sig: Take 1 Tablet by mouth 2 times a day.   cholestyramine (QUESTRAN) 4 GM/DOSE powder 9/29/2021 at 1700  Yes No   Sig: Take 4 g by mouth 2 times a day. Mixed with 4-6 oz water.  Pt takes at 1000 and 1700.   Hold all other PO medications an hour before and 4 hours after dosing.   gabapentin (NEURONTIN) 100 MG Cap 9/29/2021 at hs Patient Yes No   Sig: Take 100 mg by mouth at bedtime.   insulin glargine (LANTUS) 100 UNIT/ML Solution 9/29/2021 at pm Patient Yes No   Sig: Inject 10-20 Units under the skin every evening. Units change based upon elevated blood sugar   omeprazole (PRILOSEC) 20 MG delayed-release capsule 9/29/2021 at am Patient Yes No   Sig: Take 20 mg by mouth every morning.      Facility-Administered Medications: None       Physical Exam  Temp:  [37.8 °C (100 °F)-38 °C (100.4 °F)] 38 °C (100.4 °F)  Pulse:  [] 96  Resp:  [14-22] 22  BP:  (137-170)/(87-99) 137/87  SpO2:  [95 %-97 %] 97 %  Blood Pressure: 141/99   Temperature: 38 °C (100.4 °F)   Pulse: (Abnormal) 105   Respiration: 16   Pulse Oximetry: 95 %       Physical Exam  Vitals and nursing note reviewed.   Constitutional:       General: He is not in acute distress.     Appearance: Normal appearance. He is well-developed. He is not diaphoretic.   HENT:      Head: Normocephalic and atraumatic.      Right Ear: External ear normal.      Left Ear: External ear normal.      Nose: Nose normal.      Mouth/Throat:      Pharynx: Oropharynx is clear. No oropharyngeal exudate or posterior oropharyngeal erythema.   Eyes:      General: No scleral icterus.        Right eye: No discharge.         Left eye: No discharge.      Conjunctiva/sclera: Conjunctivae normal.      Pupils: Pupils are equal, round, and reactive to light.   Neck:      Thyroid: No thyromegaly.      Vascular: No JVD.      Trachea: No tracheal deviation.   Cardiovascular:      Rate and Rhythm: Regular rhythm. Tachycardia present.      Heart sounds: Normal heart sounds. No murmur heard.   No friction rub. No gallop.    Pulmonary:      Effort: Pulmonary effort is normal. No respiratory distress.      Breath sounds: Normal breath sounds. No stridor. No wheezing or rales.   Abdominal:      General: Bowel sounds are normal. There is no distension.      Palpations: Abdomen is soft. There is no mass.      Tenderness: There is no abdominal tenderness. There is no guarding or rebound.   Musculoskeletal:         General: Swelling (Left foot) and deformity (Right total toe amputation wound well-healed) present. No tenderness.      Cervical back: Neck supple. No tenderness.      Right lower leg: No edema.      Left lower leg: No edema.      Comments: Foul-smelling purulent drainage from the left foot with open wound with surrounding erythema.  No tenderness to palpation given neuropathy.   Lymphadenopathy:      Cervical: No cervical adenopathy.   Skin:      General: Skin is warm and dry.      Capillary Refill: Capillary refill takes 2 to 3 seconds.      Coloration: Skin is not pale.      Findings: Erythema (Left third toe) present. No rash.   Neurological:      Mental Status: He is alert and oriented to person, place, and time.      Sensory: Sensory deficit (Decree sensation to light touch in both feet) present.      Motor: No weakness or abnormal muscle tone.   Psychiatric:         Behavior: Behavior normal.         Thought Content: Thought content normal.         Judgment: Judgment normal.           Laboratory:  Recent Labs     09/30/21  0458   WBC 9.1   RBC 4.05*   HEMOGLOBIN 11.8*   HEMATOCRIT 35.7*   MCV 88.1   MCH 29.1   MCHC 33.1*   RDW 45.1   PLATELETCT 311   MPV 10.0     Recent Labs     09/30/21  0458   SODIUM 135   POTASSIUM 3.9   CHLORIDE 102   CO2 21   GLUCOSE 220*   BUN 15   CREATININE 1.15   CALCIUM 8.8     Recent Labs     09/30/21  0458   ALTSGPT 16   ASTSGOT 19   ALKPHOSPHAT 67   TBILIRUBIN 1.6*   GLUCOSE 220*     Recent Labs     09/30/21  0458   INR 1.04     No results for input(s): NTPROBNP in the last 72 hours.      No results for input(s): TROPONINT in the last 72 hours.    Imaging:  DX-FOOT-COMPLETE 3+ LEFT   Final Result         Osseous destruction involving the left third mid and distal phalanges, in keeping with osteomyelitis.      MR-FOOT-WITH LEFT    (Results Pending)         I have personally reviewed the patient's x-ray of the left foot.  Per my read there is lack of bone within the mid third phalanges.      Assessment/Plan:  I anticipate this patient will require at least two midnights for appropriate medical management, necessitating inpatient admission.    * Sepsis (HCC)- (present on admission)  Assessment & Plan  This is Sepsis Present on admission  SIRS criteria identified on my evaluation include: Tachycardia, with heart rate greater than 90 BPM and Tachypnea, with respirations greater than 20 per minute  Source is left foot  osteomyelitis  Sepsis protocol initiated  Fluid resuscitation ordered per protocol  IV antibiotics as appropriate for source of sepsis: Zosyn and vancomycin  While organ dysfunction may be noted elsewhere in this problem list or in the chart, degree of organ dysfunction does not meet CMS criteria for severe sepsis    Admit to medical floor inpatient status.  Continue IV fluid resuscitation and antibiotics as per above.    Cellulitis of toe of left foot- (present on admission)  Assessment & Plan  As per history and exam.  Likely cause of sepsis and osteomyelitis as well.  Foul-smelling purulent drainage concerning for Pseudomonas and polymicrobial infection in setting of diabetes mellitus.    Continue vancomycin and Zosyn.  Cultures were obtained by myself from the wound in the ER.    Osteomyelitis of left foot (HCC)- (present on admission)  Assessment & Plan  Likely source of patient's sepsis.  As per x-ray shows bony destruction.    Continue vancomycin and Zosyn.  Limb preservation service.  MRI of the left foot with contrast.  Check inflammatory markers including procalcitonin, ESR, and CRP.    History of COVID-19- (present on admission)  Assessment & Plan  Had COVID-19 infection 1.5 months ago with resolution of symptoms.  He completed course of dexamethasone.    I counseled the patient on obtaining vaccination to avoid reinfection.    History of non-ST elevation myocardial infarction (NSTEMI)- (present on admission)  Assessment & Plan  As per history.  No active chest pain.  Status post stenting in 2019 status post dual antiplatelet therapy currently on aspirin.    Continue home statin and baby aspirin.  Preoperative EKG.    Presence of stent in coronary artery- (present on admission)  Assessment & Plan  As per history, nonobstructive LAD in 2019 status post stenting.  Currently on aspirin.    Continue home aspirin.    Controlled type 2 diabetes mellitus, with long-term current use of insulin ([facility])- (present on  admission)  Assessment & Plan  Last hemoglobin A1c 7.8 three months ago.    Glargine insulin 10 units subcutaneously  Lispro insulin sign scale.  Hypoglycemia protocol.  Diabetic diet  Recheck hemoglobin A1c.    Lower limb diabetic mononeuropathy (HCC)- (present on admission)  Assessment & Plan  As per history.  Stable.    Continue home gabapentin.    Essential hypertension- (present on admission)  Assessment & Plan  As per history.  Currently uncontrolled.  Blood pressure goal less than 130/80.    Continue home carvedilol.  Continue to monitor closely.    Normocytic anemia- (present on admission)  Assessment & Plan  Mild.  Stable.  Likely due to anemia of chronic inflammation.    Continue to monitor.    S/P transmetatarsal amputation of foot, right (HCC)- (present on admission)  Assessment & Plan  As per history per Dr. Bernardo.  Wound appears well-healed.      VTE prophylaxis: SCDs/TEDs and heparin ppx

## 2021-09-30 NOTE — ED PROVIDER NOTES
"ED Provider Note    Scribed for Enmanuel Combs M.D. by Jm Castañeda. 9/30/2021  4:47 AM    Primary care provider: Florinda Pascual M.D.  Means of arrival: Walk-in  History obtained from: Patient  History limited by: None    CHIEF COMPLAINT  Chief Complaint   Patient presents with    Wound Check     Patient reports he has an infected middle toe that started as a cut but now is \"smelling so bad.\" Patient has a history of diabetes with r sided toe amputations. Patient reports his blood sugar was running high last month after taking steroids for Covid.        HPI  Beni Moore is a 51 y.o. male who presents to the Emergency Department for evaluation of a wound to his left third toe that developed several days ago. He reports that he initially just had a cut, but it has since become infected with significant foul odor present. This morning he also developed a fever. Additionally, he reports that while attempting to clean the wound, a piece of \"white matter\" came out that he is concerned may have been a piece of bone. Over the last month he has been hyperglycemic consistently after being treated with steroids for COVID-19. Dr. Bernardo performed his prior right sided toe amputations. No cough or shortness of breath.    REVIEW OF SYSTEMS  Pertinent positives include wound infection and fever.   Pertinent negatives include no cough or shortness of breath.    All other systems reviewed and negative. See HPI for further details.     PAST MEDICAL HISTORY   has a past medical history of Diabetes (HCC) and Hypertension.    SURGICAL HISTORY   has a past surgical history that includes other orthopedic surgery; other; toe amputation (Right, 7/2/2018); achilles tendon repair (Right, 7/2/2018); irrigation & debridement ortho (Right, 7/2/2018); vasectomy; tonsillectomy; and other abdominal surgery.    SOCIAL HISTORY  Social History     Tobacco Use    Smoking status: Never Smoker    Smokeless tobacco: Never Used   Vaping Use    " Vaping Use: Never used   Substance Use Topics    Alcohol use: No    Drug use: No      Social History     Substance and Sexual Activity   Drug Use No       FAMILY HISTORY  Family History   Problem Relation Age of Onset    No Known Problems Mother     Diabetes Father     Heart Disease Father     Diabetes Maternal Grandmother     Heart Disease Maternal Grandfather     Lung Disease Paternal Grandmother     Cancer Paternal Grandmother     Cancer Paternal Grandfather     Lung Cancer Paternal Grandfather        CURRENT MEDICATIONS  Current Outpatient Medications   Medication Instructions    aspirin 81 mg, Oral, EVERY DAY    atorvastatin (LIPITOR) 80 mg, Oral, EVERY EVENING    carvedilol (COREG) 6.25 mg, Oral, 2 TIMES DAILY    cholestyramine (QUESTRAN) 4 GM/DOSE powder No dose, route, or frequency recorded.    cholestyramine light (PREVALITE) 4 g, Oral, 2 TIMES DAILY, Mix 1 scoop (4 g) in applesauce or juice and take twice daily.    gabapentin (NEURONTIN) 100 mg, Oral, EVERY BEDTIME    glimepiride (AMARYL) 4 mg, Oral, EVERY DAY    ibuprofen (MOTRIN) 400 mg, Oral, EVERY 6 HOURS PRN    insulin glargine (LANTUS) 10-20 Units, Subcutaneous, EVERY EVENING    JARDIANCE 10 MG Tab 1 Tablet, Oral, DAILY    omeprazole (PRILOSEC) 20 mg, Oral, EVERY MORNING    Ranibizumab (LUCENTIS IZ) 1 Dose, Intravitreal, EVERY 8 WEEKS       ALLERGIES  No Known Allergies    PHYSICAL EXAM  VITAL SIGNS: /99   Pulse (!) 105   Temp 38 °C (100.4 °F) (Oral)   Resp 16   Wt 100 kg (221 lb 5.5 oz)   SpO2 95%   BMI 30.02 kg/m²     Nursing note and vitals reviewed.  Constitutional: Well-developed and well-nourished.  Moderate distress, somewhat uncomfortable appearing.    HENT: Head is normocephalic and atraumatic. Oropharynx is clear and moist without exudate or erythema.   Eyes: Pupils are equal, round, and reactive to light. Conjunctiva are normal.   Cardiovascular: Tachycardic and regular rhythm. No murmur heard. Normal radial  pulses.  Pulmonary/Chest: Breath sounds normal. No wheezes or rales.   Abdominal: Soft and non-tender. No distention    Musculoskeletal: Extremities exhibit normal range of motion without edema or tenderness. Third toe on left foot is necrotic, there is an ulceration at the midpoint of the dorsum of the toe, bony integrity seems to be disrupted, with erythema extending to the forefoot.  Neurological: Awake, alert and oriented to person, place, and time. No focal deficits noted.  Skin: Skin is warm and dry. No rash.  Diabetic foot infection as above.  Psychiatric: Normal mood and affect. Appropriate for clinical situation.     DIAGNOSTIC STUDIES / PROCEDURES    LABS  Results for orders placed or performed during the hospital encounter of 09/30/21   Lactic acid (lactate)   Result Value Ref Range    Lactic Acid 1.4 0.5 - 2.0 mmol/L   CBC WITH DIFFERENTIAL   Result Value Ref Range    WBC 9.1 4.8 - 10.8 K/uL    RBC 4.05 (L) 4.70 - 6.10 M/uL    Hemoglobin 11.8 (L) 14.0 - 18.0 g/dL    Hematocrit 35.7 (L) 42.0 - 52.0 %    MCV 88.1 81.4 - 97.8 fL    MCH 29.1 27.0 - 33.0 pg    MCHC 33.1 (L) 33.7 - 35.3 g/dL    RDW 45.1 35.9 - 50.0 fL    Platelet Count 311 164 - 446 K/uL    MPV 10.0 9.0 - 12.9 fL    Neutrophils-Polys 72.90 (H) 44.00 - 72.00 %    Lymphocytes 14.00 (L) 22.00 - 41.00 %    Monocytes 12.00 0.00 - 13.40 %    Eosinophils 0.40 0.00 - 6.90 %    Basophils 0.40 0.00 - 1.80 %    Immature Granulocytes 0.30 0.00 - 0.90 %    Nucleated RBC 0.00 /100 WBC    Neutrophils (Absolute) 6.63 1.82 - 7.42 K/uL    Lymphs (Absolute) 1.27 1.00 - 4.80 K/uL    Monos (Absolute) 1.09 (H) 0.00 - 0.85 K/uL    Eos (Absolute) 0.04 0.00 - 0.51 K/uL    Baso (Absolute) 0.04 0.00 - 0.12 K/uL    Immature Granulocytes (abs) 0.03 0.00 - 0.11 K/uL    NRBC (Absolute) 0.00 K/uL   COMP METABOLIC PANEL   Result Value Ref Range    Sodium 135 135 - 145 mmol/L    Potassium 3.9 3.6 - 5.5 mmol/L    Chloride 102 96 - 112 mmol/L    Co2 21 20 - 33 mmol/L    Anion  Gap 12.0 7.0 - 16.0    Glucose 220 (H) 65 - 99 mg/dL    Bun 15 8 - 22 mg/dL    Creatinine 1.15 0.50 - 1.40 mg/dL    Calcium 8.8 8.5 - 10.5 mg/dL    AST(SGOT) 19 12 - 45 U/L    ALT(SGPT) 16 2 - 50 U/L    Alkaline Phosphatase 67 30 - 99 U/L    Total Bilirubin 1.6 (H) 0.1 - 1.5 mg/dL    Albumin 3.6 3.2 - 4.9 g/dL    Total Protein 6.9 6.0 - 8.2 g/dL    Globulin 3.3 1.9 - 3.5 g/dL    A-G Ratio 1.1 g/dL   Prothrombin time (INR)   Result Value Ref Range    PT 13.3 12.0 - 14.6 sec    INR 1.04 0.87 - 1.13   ESTIMATED GFR   Result Value Ref Range    GFR If African American >60 >60 mL/min/1.73 m 2    GFR If Non African American >60 >60 mL/min/1.73 m 2     All labs reviewed by me.    RADIOLOGY  DX-FOOT-COMPLETE 3+ LEFT   Final Result         Osseous destruction involving the left third mid and distal phalanges, in keeping with osteomyelitis.        The radiologist's interpretation of all radiological studies have been reviewed by me.    COURSE & MEDICAL DECISION MAKING  Nursing notes, VS, PMSFHx reviewed in chart.     Review of past medical records shows the patient had COVID-19 in August 2021 and was started on steroids. He subsequently had hyperglycemia. He has a history of diabetes with toe amputations     4:47 AM - Patient seen and examined at bedside. Patient will be treated with Zosyn and Vancomycin. Ordered DX-foot, PT/INR, blood culture, UA, lactate, CBC with differential, CMP to evaluate his symptoms. The differential diagnoses include but are not limited to: presents today with a diabetic foot infection with likely osteomyelitis      5:45 AM - Paged Hospitalist.    5:57 AM - I discussed the patient's case and the above findings with Dr. Bay (Hospitalist) who agrees to evaluate the patient for hospitalization.    Patient presents today with sepsis due to diabetic foot infection.  Is febrile, tachycardic.  Clearly has a necrotic third toe.  Osteomyelitis noted on x-ray.  Anticipate that he will undergo amputation.   Also requires antibiotics.  I spoke with the on-call hospitalist accepts patient for admission.  Anticipate consultation with the limb preservation service.    CRITICAL CARE  I provided critical care services, which included medication orders, frequent reevaluations of the patient's condition and response to treatment, ordering and reviewing test results, and discussing the case with various consultants.  The critical care time associated with the care of the patient was 35 minutes. Review chart for interventions. This time is exclusive of any other billable procedures.      DISPOSITION:  Patient will be hospitalized by Dr. Bay in guarded condition.    FINAL IMPRESSION  1. Sepsis, due to unspecified organism, unspecified whether acute organ dysfunction present (HCC)    2. Diabetic foot infection (HCC)    3. Toe osteomyelitis (HCC)          IJm (Scribcharo), am scribing for, and in the presence of, Enmanuel Combs M.D..    Electronically signed by: Jm Castañeda (Jordy), 9/30/2021    IEnmanuel M.D. personally performed the services described in this documentation, as scribed by Jm Castañeda in my presence, and it is both accurate and complete.    The note accurately reflects work and decisions made by me.  Enmanuel Combs M.D.  9/30/2021  10:07 AM

## 2021-10-01 PROBLEM — E11.628 DIABETIC FOOT INFECTION (HCC): Status: ACTIVE | Noted: 2021-09-30

## 2021-10-01 PROBLEM — L08.9 DIABETIC FOOT INFECTION (HCC): Status: ACTIVE | Noted: 2021-09-30

## 2021-10-01 PROBLEM — E11.65 TYPE 2 DIABETES MELLITUS WITH HYPERGLYCEMIA, WITH LONG-TERM CURRENT USE OF INSULIN (HCC): Status: ACTIVE | Noted: 2021-07-16

## 2021-10-01 PROBLEM — E83.42 HYPOMAGNESEMIA: Status: ACTIVE | Noted: 2021-10-01

## 2021-10-01 LAB
ANION GAP SERPL CALC-SCNC: 9 MMOL/L (ref 7–16)
APPEARANCE UR: CLEAR
BACTERIA #/AREA URNS HPF: NEGATIVE /HPF
BASOPHILS # BLD AUTO: 0.8 % (ref 0–1.8)
BASOPHILS # BLD: 0.05 K/UL (ref 0–0.12)
BILIRUB UR QL STRIP.AUTO: NEGATIVE
BUN SERPL-MCNC: 9 MG/DL (ref 8–22)
CALCIUM SERPL-MCNC: 7.9 MG/DL (ref 8.5–10.5)
CHLORIDE SERPL-SCNC: 105 MMOL/L (ref 96–112)
CO2 SERPL-SCNC: 22 MMOL/L (ref 20–33)
COLOR UR: YELLOW
CREAT SERPL-MCNC: 0.97 MG/DL (ref 0.5–1.4)
EKG IMPRESSION: NORMAL
EOSINOPHIL # BLD AUTO: 0.05 K/UL (ref 0–0.51)
EOSINOPHIL NFR BLD: 0.8 % (ref 0–6.9)
EPI CELLS #/AREA URNS HPF: NEGATIVE /HPF
ERYTHROCYTE [DISTWIDTH] IN BLOOD BY AUTOMATED COUNT: 44.3 FL (ref 35.9–50)
GLUCOSE BLD-MCNC: 111 MG/DL (ref 65–99)
GLUCOSE BLD-MCNC: 115 MG/DL (ref 65–99)
GLUCOSE BLD-MCNC: 136 MG/DL (ref 65–99)
GLUCOSE BLD-MCNC: 166 MG/DL (ref 65–99)
GLUCOSE SERPL-MCNC: 120 MG/DL (ref 65–99)
GLUCOSE UR STRIP.AUTO-MCNC: >=1000 MG/DL
HCT VFR BLD AUTO: 33.7 % (ref 42–52)
HGB BLD-MCNC: 11.1 G/DL (ref 14–18)
HYALINE CASTS #/AREA URNS LPF: ABNORMAL /LPF
IMM GRANULOCYTES # BLD AUTO: 0.04 K/UL (ref 0–0.11)
IMM GRANULOCYTES NFR BLD AUTO: 0.6 % (ref 0–0.9)
KETONES UR STRIP.AUTO-MCNC: 15 MG/DL
LEUKOCYTE ESTERASE UR QL STRIP.AUTO: NEGATIVE
LYMPHOCYTES # BLD AUTO: 1.35 K/UL (ref 1–4.8)
LYMPHOCYTES NFR BLD: 20.4 % (ref 22–41)
MAGNESIUM SERPL-MCNC: 1.6 MG/DL (ref 1.5–2.5)
MCH RBC QN AUTO: 29.1 PG (ref 27–33)
MCHC RBC AUTO-ENTMCNC: 32.9 G/DL (ref 33.7–35.3)
MCV RBC AUTO: 88.5 FL (ref 81.4–97.8)
MICRO URNS: ABNORMAL
MONOCYTES # BLD AUTO: 0.91 K/UL (ref 0–0.85)
MONOCYTES NFR BLD AUTO: 13.7 % (ref 0–13.4)
NEUTROPHILS # BLD AUTO: 4.22 K/UL (ref 1.82–7.42)
NEUTROPHILS NFR BLD: 63.7 % (ref 44–72)
NITRITE UR QL STRIP.AUTO: NEGATIVE
NRBC # BLD AUTO: 0 K/UL
NRBC BLD-RTO: 0 /100 WBC
PH UR STRIP.AUTO: 5 [PH] (ref 5–8)
PLATELET # BLD AUTO: 268 K/UL (ref 164–446)
PMV BLD AUTO: 10.3 FL (ref 9–12.9)
POTASSIUM SERPL-SCNC: 3.7 MMOL/L (ref 3.6–5.5)
PROT UR QL STRIP: 300 MG/DL
RBC # BLD AUTO: 3.81 M/UL (ref 4.7–6.1)
RBC # URNS HPF: ABNORMAL /HPF
RBC UR QL AUTO: ABNORMAL
SODIUM SERPL-SCNC: 136 MMOL/L (ref 135–145)
SP GR UR STRIP.AUTO: 1.02
UROBILINOGEN UR STRIP.AUTO-MCNC: 0.2 MG/DL
WBC # BLD AUTO: 6.6 K/UL (ref 4.8–10.8)
WBC #/AREA URNS HPF: ABNORMAL /HPF

## 2021-10-01 PROCEDURE — 700105 HCHG RX REV CODE 258: Performed by: STUDENT IN AN ORGANIZED HEALTH CARE EDUCATION/TRAINING PROGRAM

## 2021-10-01 PROCEDURE — 700111 HCHG RX REV CODE 636 W/ 250 OVERRIDE (IP): Performed by: STUDENT IN AN ORGANIZED HEALTH CARE EDUCATION/TRAINING PROGRAM

## 2021-10-01 PROCEDURE — 93010 ELECTROCARDIOGRAM REPORT: CPT | Performed by: INTERNAL MEDICINE

## 2021-10-01 PROCEDURE — 99233 SBSQ HOSP IP/OBS HIGH 50: CPT | Performed by: FAMILY MEDICINE

## 2021-10-01 PROCEDURE — A9270 NON-COVERED ITEM OR SERVICE: HCPCS | Performed by: STUDENT IN AN ORGANIZED HEALTH CARE EDUCATION/TRAINING PROGRAM

## 2021-10-01 PROCEDURE — 770001 HCHG ROOM/CARE - MED/SURG/GYN PRIV*

## 2021-10-01 PROCEDURE — 83735 ASSAY OF MAGNESIUM: CPT

## 2021-10-01 PROCEDURE — 80048 BASIC METABOLIC PNL TOTAL CA: CPT

## 2021-10-01 PROCEDURE — 85025 COMPLETE CBC W/AUTO DIFF WBC: CPT

## 2021-10-01 PROCEDURE — 82962 GLUCOSE BLOOD TEST: CPT

## 2021-10-01 PROCEDURE — 36415 COLL VENOUS BLD VENIPUNCTURE: CPT

## 2021-10-01 PROCEDURE — 700102 HCHG RX REV CODE 250 W/ 637 OVERRIDE(OP): Performed by: STUDENT IN AN ORGANIZED HEALTH CARE EDUCATION/TRAINING PROGRAM

## 2021-10-01 PROCEDURE — 700111 HCHG RX REV CODE 636 W/ 250 OVERRIDE (IP): Performed by: FAMILY MEDICINE

## 2021-10-01 RX ORDER — MAGNESIUM SULFATE HEPTAHYDRATE 40 MG/ML
2 INJECTION, SOLUTION INTRAVENOUS ONCE
Status: COMPLETED | OUTPATIENT
Start: 2021-10-01 | End: 2021-10-01

## 2021-10-01 RX ADMIN — HEPARIN SODIUM 5000 UNITS: 5000 INJECTION, SOLUTION INTRAVENOUS; SUBCUTANEOUS at 05:56

## 2021-10-01 RX ADMIN — HEPARIN SODIUM 5000 UNITS: 5000 INJECTION, SOLUTION INTRAVENOUS; SUBCUTANEOUS at 13:41

## 2021-10-01 RX ADMIN — CARVEDILOL 6.25 MG: 6.25 TABLET, FILM COATED ORAL at 22:36

## 2021-10-01 RX ADMIN — MAGNESIUM SULFATE 2 G: 2 INJECTION INTRAVENOUS at 08:15

## 2021-10-01 RX ADMIN — HEPARIN SODIUM 5000 UNITS: 5000 INJECTION, SOLUTION INTRAVENOUS; SUBCUTANEOUS at 22:37

## 2021-10-01 RX ADMIN — PIPERACILLIN AND TAZOBACTAM 3.38 G: 3; .375 INJECTION, POWDER, LYOPHILIZED, FOR SOLUTION INTRAVENOUS; PARENTERAL at 05:55

## 2021-10-01 RX ADMIN — INSULIN GLARGINE 10 UNITS: 100 INJECTION, SOLUTION SUBCUTANEOUS at 18:21

## 2021-10-01 RX ADMIN — PIPERACILLIN AND TAZOBACTAM 3.38 G: 3; .375 INJECTION, POWDER, LYOPHILIZED, FOR SOLUTION INTRAVENOUS; PARENTERAL at 13:41

## 2021-10-01 RX ADMIN — CHOLESTYRAMINE 4 G: 4 POWDER, FOR SUSPENSION ORAL at 18:20

## 2021-10-01 RX ADMIN — ASPIRIN 81 MG: 81 TABLET, COATED ORAL at 05:56

## 2021-10-01 RX ADMIN — ATORVASTATIN CALCIUM 80 MG: 40 TABLET, FILM COATED ORAL at 22:36

## 2021-10-01 RX ADMIN — GABAPENTIN 100 MG: 100 CAPSULE ORAL at 22:36

## 2021-10-01 RX ADMIN — CARVEDILOL 6.25 MG: 6.25 TABLET, FILM COATED ORAL at 08:15

## 2021-10-01 RX ADMIN — PIPERACILLIN AND TAZOBACTAM 3.38 G: 3; .375 INJECTION, POWDER, LYOPHILIZED, FOR SOLUTION INTRAVENOUS; PARENTERAL at 22:37

## 2021-10-01 RX ADMIN — CHOLESTYRAMINE 4 G: 4 POWDER, FOR SUSPENSION ORAL at 09:54

## 2021-10-01 RX ADMIN — OMEPRAZOLE 20 MG: 20 CAPSULE, DELAYED RELEASE ORAL at 05:56

## 2021-10-01 ASSESSMENT — ENCOUNTER SYMPTOMS
BLURRED VISION: 0
WEAKNESS: 0
DIAPHORESIS: 0
DIZZINESS: 0
SHORTNESS OF BREATH: 0
NECK PAIN: 0
MYALGIAS: 0
FLANK PAIN: 0
CHILLS: 0
BACK PAIN: 0
SORE THROAT: 0
NERVOUS/ANXIOUS: 0
FOCAL WEAKNESS: 0
VOMITING: 0
FEVER: 0
SENSORY CHANGE: 0
SPEECH CHANGE: 0
DIARRHEA: 0
PALPITATIONS: 0
NAUSEA: 0
HEARTBURN: 0
ABDOMINAL PAIN: 0
WHEEZING: 0
HEADACHES: 0

## 2021-10-01 ASSESSMENT — COGNITIVE AND FUNCTIONAL STATUS - GENERAL
MOBILITY SCORE: 24
DAILY ACTIVITIY SCORE: 24
SUGGESTED CMS G CODE MODIFIER MOBILITY: CH
SUGGESTED CMS G CODE MODIFIER DAILY ACTIVITY: CH

## 2021-10-01 ASSESSMENT — PAIN DESCRIPTION - PAIN TYPE
TYPE: SURGICAL PAIN
TYPE: SURGICAL PAIN

## 2021-10-01 NOTE — PROGRESS NOTES
Hospital Medicine Daily Progress Note    Date of Service  10/1/2021    Chief Complaint  Beni Moore is a 51 y.o. male admitted 9/30/2021 with diabetic foot infection.    Hospital Course  Admitted with diabetic foot infection and left third toe osteomyelitis.  LPS was consulted on the case, patient underwent Left third toe amputation through MTP on 9/30/2021.  He was started on empiric coverage with IV vancomycin and Zosyn.    Interval Problem Update  DFI/OM - pain controlled, culture and pathology pending, MRSA PCR negative  Diabetes - -150  Hypertension - sbp   Low magnesium    I have personally seen and examined the patient at bedside. I discussed the plan of care with patient, bedside RN, charge RN,  and LPS.    Consultants/Specialty  LPS    Code Status  Full Code    Disposition  Patient is not medically cleared.   Anticipate discharge to to home with close outpatient follow-up.  I have placed the appropriate orders for post-discharge needs.    Review of Systems  Review of Systems   Constitutional: Negative for chills, diaphoresis, fever and malaise/fatigue.   HENT: Negative for congestion, hearing loss and sore throat.    Eyes: Negative for blurred vision.   Respiratory: Negative for shortness of breath and wheezing.    Cardiovascular: Negative for chest pain, palpitations and leg swelling.   Gastrointestinal: Negative for abdominal pain, diarrhea, heartburn, nausea and vomiting.   Genitourinary: Negative for dysuria, flank pain and hematuria.   Musculoskeletal: Negative for back pain, joint pain, myalgias and neck pain.   Skin: Negative for rash.   Neurological: Negative for dizziness, sensory change, speech change, focal weakness, weakness and headaches.   Psychiatric/Behavioral: The patient is not nervous/anxious.         Physical Exam  Temp:  [36.1 °C (97 °F)-37.8 °C (100 °F)] 37.8 °C (100 °F)  Pulse:  [] 101  Resp:  [11-18] 17  BP: ()/(52-96) 125/74  SpO2:  [93 %-99  %] 93 %    Physical Exam  Vitals and nursing note reviewed.   Constitutional:       Appearance: He is obese.   HENT:      Head: Normocephalic and atraumatic.      Nose: No congestion.      Mouth/Throat:      Mouth: Mucous membranes are moist.   Eyes:      Extraocular Movements: Extraocular movements intact.      Conjunctiva/sclera: Conjunctivae normal.   Cardiovascular:      Rate and Rhythm: Normal rate and regular rhythm.   Pulmonary:      Effort: Pulmonary effort is normal.      Breath sounds: Normal breath sounds.   Abdominal:      General: There is no distension.      Tenderness: There is no abdominal tenderness. There is no guarding or rebound.   Musculoskeletal:      Cervical back: No tenderness.      Right lower leg: No edema.      Left lower leg: No edema.      Comments: Right transmetatarsal amputation  Left third toe amputation   Skin:     General: Skin is warm and dry.   Neurological:      General: No focal deficit present.      Mental Status: He is alert and oriented to person, place, and time.      Cranial Nerves: No cranial nerve deficit.         Fluids    Intake/Output Summary (Last 24 hours) at 10/1/2021 1041  Last data filed at 10/1/2021 0815  Gross per 24 hour   Intake 720 ml   Output 2125 ml   Net -1405 ml       Laboratory  Recent Labs     09/30/21  0458 10/01/21  0329   WBC 9.1 6.6   RBC 4.05* 3.81*   HEMOGLOBIN 11.8* 11.1*   HEMATOCRIT 35.7* 33.7*   MCV 88.1 88.5   MCH 29.1 29.1   MCHC 33.1* 32.9*   RDW 45.1 44.3   PLATELETCT 311 268   MPV 10.0 10.3     Recent Labs     09/30/21  0458 10/01/21  0329   SODIUM 135 136   POTASSIUM 3.9 3.7   CHLORIDE 102 105   CO2 21 22   GLUCOSE 220* 120*   BUN 15 9   CREATININE 1.15 0.97   CALCIUM 8.8 7.9*     Recent Labs     09/30/21  0458 09/30/21  1916   INR 1.04 1.13               Imaging  DX-FOOT-COMPLETE 3+ LEFT   Final Result         Osseous destruction involving the left third mid and distal phalanges, in keeping with osteomyelitis.            Assessment/Plan  * Diabetic foot infection (HCC)- (present on admission)  Assessment & Plan  IV Zosyn.  Stop IV Vancomycin  Follow cultures  Pain control  LPS following    Osteomyelitis of left foot (HCC)- (present on admission)  Assessment & Plan  Left third toe amputation through MTP. 9/30/2021  IV Zosyn.  Pain control, wound care  Follow pathology results    Sepsis (HCC)- (present on admission)  Assessment & Plan  This is Sepsis Present on admission  SIRS criteria identified on my evaluation include: Tachycardia, with heart rate greater than 90 BPM and Tachypnea, with respirations greater than 20 per minute  Source is left foot osteomyelitis  Sepsis protocol initiated  Fluid resuscitation ordered per protocol  IV antibiotics as appropriate for source of sepsis: Zosyn and vancomycin  While organ dysfunction may be noted elsewhere in this problem list or in the chart, degree of organ dysfunction does not meet CMS criteria for severe sepsis    Hypomagnesemia- (present on admission)  Assessment & Plan  IV Mg 2 g   Follow level    History of COVID-19- (present on admission)  Assessment & Plan  Considered noninfectious    History of non-ST elevation myocardial infarction (NSTEMI)- (present on admission)  Assessment & Plan  ASA, Corer, Lipitor    Presence of stent in coronary artery- (present on admission)  Assessment & Plan  ASA, Coreg, Lipitor    Type 2 diabetes mellitus with hyperglycemia, with long-term current use of insulin (HCC)- (present on admission)  Assessment & Plan  Semglee, SSI    Essential hypertension- (present on admission)  Assessment & Plan  Carvedilol.    S/P transmetatarsal amputation of foot, right (HCC)- (present on admission)  Assessment & Plan  LPS following    Normocytic anemia- (present on admission)  Assessment & Plan  follow cbc    Lower limb diabetic mononeuropathy (HCC)- (present on admission)  Assessment & Plan  Gabapentin.       VTE prophylaxis: heparin ppx    I have performed a  physical exam and reviewed and updated ROS and Plan today (10/1/2021). In review of yesterday's note (9/30/2021), there are no changes except as documented above.

## 2021-10-01 NOTE — PROGRESS NOTES
"Pharmacy Vancomycin Kinetics Note for 9/30/2021     51 y.o. male on Vancomycin day # 1     Vancomycin Indication (AUC Dosing): Osteomyelitis  Provider specified end date: 10/05/21    Active Antibiotics (From admission, onward)    Ordered     Ordering Provider       Thu Sep 30, 2021  6:55 PM    09/30/21 1855  vancomycin (VANCOCIN) 1,250 mg in  mL IVPB  (vancomycin (VANCOCIN) IV (LD + Maintenance))  EVERY 12 HOURS         Martha Lechuga M.D.       Thu Sep 30, 2021  5:53 PM    09/30/21 1753  MD Alert...Vancomycin per Pharmacy  PHARMACY TO DOSE        Question:  Indication(s) for vancomycin?  Answer:  Skin and soft tissue infection    Martha Lechuga M.D.    09/30/21 1753  piperacillin-tazobactam (ZOSYN) 3.375 g in  mL IVPB  (piperacillin-tazobactam (ZOSYN) IV (Extended-infusion) PANEL )  ONCE        \"And\" Linked Group Details    Martha Lechuga M.D.    09/30/21 1753  piperacillin-tazobactam (ZOSYN) 3.375 g in  mL IVPB  (piperacillin-tazobactam (ZOSYN) IV (Extended-infusion) PANEL )  EVERY 8 HOURS        \"And\" Linked Group Details    Martha Lechuga M.D.          Dosing Weight: 100 kg (220 lb 7.4 oz)      Admission History: Admitted on 9/30/2021 for Osteomyelitis (HCC) [M86.9]  Pertinent history: Hx of DM2, CAD with Stents, presents with L foot wound, confirmed osteo with Xray of L 3rd toe, S/p amputation    Allergies:     Patient has no known allergies.     Pertinent cultures to date:     Results     Procedure Component Value Units Date/Time    CULTURE TISSUE W/ GRM STAIN [[id number]] Collected: 09/30/21 1407    Order Status: Completed Specimen: Tissue Updated: 09/30/21 1646     Significant Indicator NEG     Source TISS     Site left third metatarsal     Culture Result -     Gram Stain Result -    Narrative:      Surgery Specimen    Anaerobic Culture [[id number]] Collected: 09/30/21 1407    Order Status: Completed Specimen: Tissue Updated: 09/30/21 1646     Significant Indicator NEG     Source TISS     Site " "left third metatarsal     Culture Result -    Narrative:      Surgery Specimen    MRSA By PCR (Amp) [[id number]] Collected: 21    Order Status: Completed Specimen: Respirate from Nares Updated: 21    CULTURE WOUND W/ GRAM STAIN [[id number]]     Order Status: Sent Specimen: Wound from Left Foot     Urinalysis [[id number]]     Order Status: Canceled Specimen: Urine, Clean Catch     Blood Culture [[id number]] Collected: 21    Order Status: Completed Specimen: Blood from Peripheral Updated: 2147    Narrative:      From different peripheral sites, if not done within the last  24 hours (Per Hospital Policy: Only change specimen source to  \"Line\" if specified by physician order)    Blood Culture [[id number]] Collected: 21 050    Order Status: Completed Specimen: Blood from Peripheral Updated: 2137    Narrative:      From different peripheral sites, if not done within the last  24 hours (Per Hospital Policy: Only change specimen source to  \"Line\" if specified by physician order)    Urinalysis [[id number]]     Order Status: Sent Specimen: Urine, Clean Catch     Blood Culture [[id number]] Collected: 21 0000    Order Status: Canceled Specimen: Other from Peripheral     Blood Culture [[id number]] Collected: 21 0000    Order Status: Canceled Specimen: Other from Peripheral           Labs:     Estimated Creatinine Clearance: 91 mL/min (by C-G formula based on SCr of 1.15 mg/dL).  Recent Labs     21  0458   WBC 9.1   NEUTSPOLYS 72.90*     Recent Labs     21  0458   BUN 15   CREATININE 1.15   ALBUMIN 3.6       Intake/Output Summary (Last 24 hours) at 2021 1855  Last data filed at 2021 1419  Gross per 24 hour   Intake 1000 ml   Output --   Net 1000 ml      BP (!) 168/92   Pulse 95   Temp 37.6 °C (99.7 °F) (Temporal)   Resp 17   Wt 100 kg (221 lb 5.5 oz)   SpO2 99%  Temp (24hrs), Av.9 °C (98.4 °F), Min:36.1 °C (97 °F), Max:38 °C (100.4 " °F)      List concerns for Vancomycin clearance:     Obesity;Nephrotoxic drugs    Pharmacokinetics:     AUC kinetics:   Ke (hr ^-1): 0.0799 hr^-1  Half life: 8.68 hr  Clearance: 5.567  Estimated TDD: 2783.5  Estimated Dose: 1241  Estimated interval: 10.7      A/P:     -  Vancomycin dose: 1250mg Q12H    -  Next vancomycin level(s):    -10/2  @ 1300   -10/2 Vt @ 2100     -  Predicted vancomycin AUC from initial AUC test calculator: 449 mg·hr/L    -  Comments: Empiric Vancomycin and Zosyn for confirmed osteomyelitis, wound cultures pending, Dosing @ 1000 & 2200, Clinical pharmacy to follow    Jaqueline Hernandez, KristinD

## 2021-10-01 NOTE — PROGRESS NOTES
Please refer H&P for details    51 y.o. male with hx of DM2, right total toe amputations, HTN, HLD, CAD with stents, recent covid infection, who presented 9/30/2021 with draining left foot wound.   Labs and imaging reviewed  Xray shows signs of osteomyelitis  LPS consulted  Plans left 3rd toe amputation by orthopedics today  On abx vancomycin and zosyn  Follow up op wound culture

## 2021-10-01 NOTE — PROGRESS NOTES
Assessment complete.  A&O x 4. Patient calls appropriately.  Patient ambulates with x1 assist.  Patient has 0/10 pain. Pain managed with prescribed medications.  Denies N&V. Tolerating DM diet.  L toe amputation dressing CDI.  + void, - flatus, - BM.  Patient denies SOB.  SCD's off.    Review plan with of care with patient. Call light and personal belongings within reach. Hourly rounding in place. All needs met at this time.

## 2021-10-01 NOTE — PROGRESS NOTES
Assumed care of patient at 0700. Patient is alert and oriented, respirations are unlabored and regular on room air. Patient lying in bed at this time. Patient denies pain. Lung sounds clear throughout. Abdomen soft, non tender, +bowel sounds. Voiding without difficulty. Baseline neuropathy to BLE, tingling to bilateral hands. Left foot dressing, ace wrap, CDI, patient able to wiggle toes, <3 cap refill. Patient states no needs at this time.

## 2021-10-02 LAB
ANION GAP SERPL CALC-SCNC: 10 MMOL/L (ref 7–16)
BASOPHILS # BLD AUTO: 0.5 % (ref 0–1.8)
BASOPHILS # BLD: 0.04 K/UL (ref 0–0.12)
BUN SERPL-MCNC: 10 MG/DL (ref 8–22)
CALCIUM SERPL-MCNC: 8 MG/DL (ref 8.5–10.5)
CHLORIDE SERPL-SCNC: 102 MMOL/L (ref 96–112)
CO2 SERPL-SCNC: 22 MMOL/L (ref 20–33)
CREAT SERPL-MCNC: 0.93 MG/DL (ref 0.5–1.4)
EOSINOPHIL # BLD AUTO: 0.06 K/UL (ref 0–0.51)
EOSINOPHIL NFR BLD: 0.8 % (ref 0–6.9)
ERYTHROCYTE [DISTWIDTH] IN BLOOD BY AUTOMATED COUNT: 43.5 FL (ref 35.9–50)
GLUCOSE BLD-MCNC: 127 MG/DL (ref 65–99)
GLUCOSE BLD-MCNC: 161 MG/DL (ref 65–99)
GLUCOSE BLD-MCNC: 177 MG/DL (ref 65–99)
GLUCOSE SERPL-MCNC: 150 MG/DL (ref 65–99)
HCT VFR BLD AUTO: 34.5 % (ref 42–52)
HGB BLD-MCNC: 11.4 G/DL (ref 14–18)
IMM GRANULOCYTES # BLD AUTO: 0.03 K/UL (ref 0–0.11)
IMM GRANULOCYTES NFR BLD AUTO: 0.4 % (ref 0–0.9)
LYMPHOCYTES # BLD AUTO: 1.45 K/UL (ref 1–4.8)
LYMPHOCYTES NFR BLD: 19.6 % (ref 22–41)
MCH RBC QN AUTO: 29 PG (ref 27–33)
MCHC RBC AUTO-ENTMCNC: 33 G/DL (ref 33.7–35.3)
MCV RBC AUTO: 87.8 FL (ref 81.4–97.8)
MONOCYTES # BLD AUTO: 1.19 K/UL (ref 0–0.85)
MONOCYTES NFR BLD AUTO: 16.1 % (ref 0–13.4)
NEUTROPHILS # BLD AUTO: 4.61 K/UL (ref 1.82–7.42)
NEUTROPHILS NFR BLD: 62.6 % (ref 44–72)
NRBC # BLD AUTO: 0 K/UL
NRBC BLD-RTO: 0 /100 WBC
PLATELET # BLD AUTO: 247 K/UL (ref 164–446)
PMV BLD AUTO: 10.1 FL (ref 9–12.9)
POTASSIUM SERPL-SCNC: 4 MMOL/L (ref 3.6–5.5)
RBC # BLD AUTO: 3.93 M/UL (ref 4.7–6.1)
SODIUM SERPL-SCNC: 134 MMOL/L (ref 135–145)
WBC # BLD AUTO: 7.4 K/UL (ref 4.8–10.8)

## 2021-10-02 PROCEDURE — 700105 HCHG RX REV CODE 258: Performed by: STUDENT IN AN ORGANIZED HEALTH CARE EDUCATION/TRAINING PROGRAM

## 2021-10-02 PROCEDURE — 770001 HCHG ROOM/CARE - MED/SURG/GYN PRIV*

## 2021-10-02 PROCEDURE — 80048 BASIC METABOLIC PNL TOTAL CA: CPT

## 2021-10-02 PROCEDURE — 700102 HCHG RX REV CODE 250 W/ 637 OVERRIDE(OP): Performed by: STUDENT IN AN ORGANIZED HEALTH CARE EDUCATION/TRAINING PROGRAM

## 2021-10-02 PROCEDURE — 36415 COLL VENOUS BLD VENIPUNCTURE: CPT

## 2021-10-02 PROCEDURE — 99233 SBSQ HOSP IP/OBS HIGH 50: CPT | Performed by: FAMILY MEDICINE

## 2021-10-02 PROCEDURE — 85025 COMPLETE CBC W/AUTO DIFF WBC: CPT

## 2021-10-02 PROCEDURE — 700111 HCHG RX REV CODE 636 W/ 250 OVERRIDE (IP): Performed by: STUDENT IN AN ORGANIZED HEALTH CARE EDUCATION/TRAINING PROGRAM

## 2021-10-02 PROCEDURE — A9270 NON-COVERED ITEM OR SERVICE: HCPCS | Performed by: STUDENT IN AN ORGANIZED HEALTH CARE EDUCATION/TRAINING PROGRAM

## 2021-10-02 PROCEDURE — 82962 GLUCOSE BLOOD TEST: CPT | Mod: 91

## 2021-10-02 RX ADMIN — OMEPRAZOLE 20 MG: 20 CAPSULE, DELAYED RELEASE ORAL at 05:43

## 2021-10-02 RX ADMIN — PIPERACILLIN AND TAZOBACTAM 3.38 G: 3; .375 INJECTION, POWDER, LYOPHILIZED, FOR SOLUTION INTRAVENOUS; PARENTERAL at 05:43

## 2021-10-02 RX ADMIN — HEPARIN SODIUM 5000 UNITS: 5000 INJECTION, SOLUTION INTRAVENOUS; SUBCUTANEOUS at 21:15

## 2021-10-02 RX ADMIN — HEPARIN SODIUM 5000 UNITS: 5000 INJECTION, SOLUTION INTRAVENOUS; SUBCUTANEOUS at 05:43

## 2021-10-02 RX ADMIN — CARVEDILOL 6.25 MG: 6.25 TABLET, FILM COATED ORAL at 21:16

## 2021-10-02 RX ADMIN — PIPERACILLIN AND TAZOBACTAM 3.38 G: 3; .375 INJECTION, POWDER, LYOPHILIZED, FOR SOLUTION INTRAVENOUS; PARENTERAL at 22:07

## 2021-10-02 RX ADMIN — INSULIN LISPRO 1 UNITS: 100 INJECTION, SOLUTION INTRAVENOUS; SUBCUTANEOUS at 12:18

## 2021-10-02 RX ADMIN — GABAPENTIN 100 MG: 100 CAPSULE ORAL at 21:16

## 2021-10-02 RX ADMIN — INSULIN LISPRO 1 UNITS: 100 INJECTION, SOLUTION INTRAVENOUS; SUBCUTANEOUS at 07:32

## 2021-10-02 RX ADMIN — HEPARIN SODIUM 5000 UNITS: 5000 INJECTION, SOLUTION INTRAVENOUS; SUBCUTANEOUS at 14:04

## 2021-10-02 RX ADMIN — CARVEDILOL 6.25 MG: 6.25 TABLET, FILM COATED ORAL at 07:36

## 2021-10-02 RX ADMIN — ATORVASTATIN CALCIUM 80 MG: 40 TABLET, FILM COATED ORAL at 21:16

## 2021-10-02 RX ADMIN — CHOLESTYRAMINE 4 G: 4 POWDER, FOR SUSPENSION ORAL at 17:00

## 2021-10-02 RX ADMIN — PIPERACILLIN AND TAZOBACTAM 3.38 G: 3; .375 INJECTION, POWDER, LYOPHILIZED, FOR SOLUTION INTRAVENOUS; PARENTERAL at 14:04

## 2021-10-02 RX ADMIN — ASPIRIN 81 MG: 81 TABLET, COATED ORAL at 05:43

## 2021-10-02 RX ADMIN — INSULIN GLARGINE 10 UNITS: 100 INJECTION, SOLUTION SUBCUTANEOUS at 17:19

## 2021-10-02 RX ADMIN — CHOLESTYRAMINE 4 G: 4 POWDER, FOR SUSPENSION ORAL at 10:28

## 2021-10-02 ASSESSMENT — ENCOUNTER SYMPTOMS
NERVOUS/ANXIOUS: 0
MYALGIAS: 0
PALPITATIONS: 0
DIARRHEA: 0
WEAKNESS: 0
SPEECH CHANGE: 0
BLURRED VISION: 0
NECK PAIN: 0
CHILLS: 0
HEARTBURN: 0
VOMITING: 0
NAUSEA: 0
SENSORY CHANGE: 0
WHEEZING: 0
SHORTNESS OF BREATH: 0
FEVER: 0
DIZZINESS: 0
HEADACHES: 0
SORE THROAT: 0
DIAPHORESIS: 0
FLANK PAIN: 0
FOCAL WEAKNESS: 0
ABDOMINAL PAIN: 0
BACK PAIN: 0

## 2021-10-02 ASSESSMENT — LIFESTYLE VARIABLES
HOW MANY TIMES IN THE PAST YEAR HAVE YOU HAD 5 OR MORE DRINKS IN A DAY: 0
ALCOHOL_USE: NO
HAVE YOU EVER FELT YOU SHOULD CUT DOWN ON YOUR DRINKING: NO
AVERAGE NUMBER OF DAYS PER WEEK YOU HAVE A DRINK CONTAINING ALCOHOL: 0
TOTAL SCORE: 0
TOTAL SCORE: 0
EVER FELT BAD OR GUILTY ABOUT YOUR DRINKING: NO
TOTAL SCORE: 0
EVER HAD A DRINK FIRST THING IN THE MORNING TO STEADY YOUR NERVES TO GET RID OF A HANGOVER: NO
HAVE PEOPLE ANNOYED YOU BY CRITICIZING YOUR DRINKING: NO
CONSUMPTION TOTAL: NEGATIVE
ON A TYPICAL DAY WHEN YOU DRINK ALCOHOL HOW MANY DRINKS DO YOU HAVE: 0

## 2021-10-02 ASSESSMENT — PAIN DESCRIPTION - PAIN TYPE
TYPE: SURGICAL PAIN

## 2021-10-02 NOTE — PROGRESS NOTES
Patient resting comfortably this am, denies pain. Does report having neuropathy. Left toe surgical site dressed with guaze and ace wrap. Off loading shoe when out of bed. Patient has previous metatarsal amputation to right foot and has been ambulating without difficulty. Wound to right tibia dressed per wound orders clean dry intact, changed by night RN 10/2/2021, due 10/4/2021. MD orders reviewed, all questions answered.     COVID 19 Crisis surge charting in effect.

## 2021-10-02 NOTE — WOUND TEAM
LIMB PRESERVATION SERVICE   POST SURGICAL PROGRESS NOTE      HPI:  Per LPS notes 9/30/21  Beni Moore is a 51 y.o.  with a past medical history that includes type 2 diabetes, neuropathy, history of right TMA, HTN, HLD, CAD with history of NSTEMI, Hx COVID19, admitted 9/30/2021 for Osteomyelitis (HCC) [M86.9].   LPS has been consulted for L 3rd toe wound.     Patient has long history of DM and neuropathy with history of right TMA and right heel burn which has healed. Patient thinks he fractured left 3rd toe 1 month ago and noticed dorsal toe wound starting 2 weeks ago. He has been performing wound care at home with silver and colloid dressings. He noted worsening swelling, redness, and found odor with discharge over past 2 days. Patient was performing his own debridement at home when he noted bone in wound and came to ED. Patient was seen in wound care clinic for partial thickness burn to right heel which has subsequently resolved.      IV antibiotics were started on this admission.  Infectious diseases has not been consulted.    Xray completed and is positive for osteomyelitis.  Ortho has not been consulted yet.     Diagnosed with diabetes 24 years ago, and is currently managing with oral medications and insulin.  Checks blood sugars few times per day and reports that these typically average 90-160s.  Has had previous diabetes education.  Does have numbness to feet.  Checks feet routinely. Usually wears diabetic insoles . Does have inserts from  that are less than 1 years old.  Has had previous foot surgeries including R TMA.  Current occupation disability.           SURGERY DATE: 9/30/21 L 3rd toe amp Dr Hart  PROCEDURE:       INTERVAL HISTORY:  10/2/2021: POD #2.    Pain well controlled.       PERTINENT LPS RESULTS:   Pathology: not finalized, micro prelim is growing a species of staph    SURGICAL SITE EXAM:      /76   Pulse 82   Temp 37.1 °C (98.8 °F) (Temporal)   Resp 18   Wt 100 kg (221  lb 5.5 oz)   SpO2 95%   BMI 30.02 kg/m²     Pedal Pulses: palpable bilaterally on 9/30/21  Sensation: insensate  Surgical foot warm       Incision well approximated, sutures intact.   Light erythema on plantar edge  Mild Edema distal foot  Scant sanguinous Drainage      Wound care completed by WT       DIABETES MANAGEMENT:    Blood glucose: Results from last 7 days   Lab Units 10/02/21  1217 10/02/21  0546 10/01/21  1952 10/01/21  1722 10/01/21  1121 10/01/21  0558 09/30/21  1755 09/30/21  1229   ACCU CHECK GLUCOSE 788 mg/dL 177* 161* 166* 136* 115* 111* 114* 150*     A1c:   Lab Results   Component Value Date/Time    HBA1C 7.5 (H) 09/30/2021 07:16 PM            INFECTION MANAGEMENT:    Results from last 7 days   Lab Units 10/02/21  0347 10/01/21  0329 09/30/21  0458   WBC 1501 K/uL 7.4 6.6 9.1   PLATELET COUNT 1518 K/uL 247 268 311     Wound culture results:   Results     Procedure Component Value Units Date/Time    CULTURE TISSUE W/ GRM STAIN [[id number]]  (Abnormal) Collected: 09/30/21 1407    Order Status: Completed Specimen: Tissue Updated: 10/02/21 1109     Significant Indicator POS     Source TISS     Site left third metatarsal     Culture Result Growth noted after further incubation, see below for  organism identification.       Gram Stain Result No organisms seen.     Culture Result Staphylococcus simulans  Rare growth      Narrative:      Surgery Specimen    Anaerobic Culture [[id number]] Collected: 09/30/21 1407    Order Status: Completed Specimen: Tissue Updated: 10/02/21 1109     Significant Indicator NEG     Source TISS     Site left third metatarsal     Culture Result Culture in progress.    Narrative:      Surgery Specimen    Urinalysis [[id number]]  (Abnormal) Collected: 09/30/21 2244    Order Status: Completed Specimen: Urine, Clean Catch Updated: 10/01/21 2303     Color Yellow     Character Clear     Specific Gravity 1.022     Ph 5.0     Glucose >=1000 mg/dL      Ketones 15 mg/dL      Protein 300  "mg/dL      Bilirubin Negative     Urobilinogen, Urine 0.2     Nitrite Negative     Leukocyte Esterase Negative     Occult Blood Moderate     Micro Urine Req Microscopic    Narrative:      If not done within the last 24 hours  3rd toe    Blood Culture [[id number]] Collected: 09/30/21 0505    Order Status: Completed Specimen: Blood from Peripheral Updated: 10/01/21 0944     Significant Indicator NEG     Source BLD     Site PERIPHERAL     Culture Result No Growth  Note: Blood cultures are incubated for 5 days and  are monitored continuously.Positive blood cultures  are called to the RN and reported as soon as  they are identified.      Narrative:      From different peripheral sites, if not done within the last  24 hours (Per Hospital Policy: Only change specimen source to  \"Line\" if specified by physician order)  No site indicated    Blood Culture [[id number]] Collected: 09/30/21 0518    Order Status: Completed Specimen: Blood from Peripheral Updated: 10/01/21 0944     Significant Indicator NEG     Source BLD     Site PERIPHERAL     Culture Result No Growth  Note: Blood cultures are incubated for 5 days and  are monitored continuously.Positive blood cultures  are called to the RN and reported as soon as  they are identified.      Narrative:      From different peripheral sites, if not done within the last  24 hours (Per Hospital Policy: Only change specimen source to  \"Line\" if specified by physician order)  No site indicated    GRAM STAIN [[id number]] Collected: 09/30/21 1407    Order Status: Completed Specimen: Tissue Updated: 09/30/21 2204     Significant Indicator .     Source TISS     Site left third metatarsal     Gram Stain Result No organisms seen.    Narrative:      Surgery Specimen    MRSA By PCR (Amp) [[id number]] Collected: 09/30/21 0727    Order Status: Completed Specimen: Respirate from Nares Updated: 09/30/21 2039     Significant Indicator NEG     Source RESP     Site NARES     MRSA PCR Negative for MRSA by " PCR.    CULTURE WOUND W/ GRAM STAIN [[id number]]     Order Status: Sent Specimen: Wound from Left Foot     Urinalysis [[id number]]     Order Status: Canceled Specimen: Urine, Clean Catch     Blood Culture [[id number]] Collected: 09/30/21 0000    Order Status: Canceled Specimen: Other from Peripheral     Blood Culture [[id number]] Collected: 09/30/21 0000    Order Status: Canceled Specimen: Other from Peripheral                ASSESSMENT/PLAN:   POD #2 S/P  Contacted Chucky HOPE and Dr Dodge with updates and questions re home abx    Wound care:   -Wound care orders updated for nursing    -Left foot: mepitel, DSD, padding and ACE  Imaging/Labs:  -COVID-19: not detected this admission but was + 8/19/21    Vascular status:   -palpable pedal pulses bilaterally     Surgery:   --no further surgeries planned at this time    Antibiotics:   -abx managed by hospitalist    Weight Bearing Status:   -Heel WBing L with off loading shoe    Offloading:   -offloading ortho shoe; ordered, at bedside  -Orthotic company: [employer]    PT/OT:   -not following at this time      Diabetes Education:   - consult CDE and RD - has not seen pt this admission    Instructed pt on home dressing change and provided supplies.  Pt instructed to follow up with Dr Hart - 2 weeks.  Reiterated the importance of heel WBing only and to elevate leg and to not have leg in dependent position for prolonged period of time.            DISCHARGE PLAN:    Disposition:   Home with home health and wound care clinic   SNF/LTAC  Rehab  Pt to follow up at OP wound clininc  Follow-up: sutures to be removed per sx orders    Discussed with: pt, RN,           Claribel Hope PT    If any questions or concerns, please contact LPS through voalte.

## 2021-10-02 NOTE — PROGRESS NOTES
Assessment complete.  A&O x 4. Patient calls appropriately.  Patient ambulates with SBA with a FWW. Ortho shoe on when OOB.  Patient has 0/10 pain. Pain managed with prescribed medications.  Denies N&V. Tolerating DM diet.  L toe amputation dressing CDI. New dressing applied to R tibial wound.  + void, - flatus, - BM.  Patient denies SOB.  SCD's off.    Review plan with of care with patient. Call light and personal belongings within reach. Hourly rounding in place. All needs met at this time.

## 2021-10-02 NOTE — CARE PLAN
The patient is Stable - Low risk of patient condition declining or worsening    Shift Goals  Clinical Goals: Ambulate  Patient Goals: Ambulate, discharge planning    Progress made toward(s) clinical / shift goals:     Problem: Fall Risk  Goal: Patient will remain free from falls  Outcome: Progressing     Problem: Knowledge Deficit - Standard  Goal: Patient and family/care givers will demonstrate understanding of plan of care, disease process/condition, diagnostic tests and medications  Outcome: Progressing     Problem: Hemodynamics  Goal: Patient's hemodynamics, fluid balance and neurologic status will be stable or improve  Outcome: Progressing

## 2021-10-02 NOTE — PROGRESS NOTES
Hospital Medicine Daily Progress Note    Date of Service  10/2/2021    Chief Complaint  Beni Moore is a 51 y.o. male admitted 9/30/2021 with diabetic foot infection.    Hospital Course  Admitted with diabetic foot infection and left third toe osteomyelitis.  LPS was consulted on the case, patient underwent Left third toe amputation through MTP on 9/30/2021.  He was started on empiric coverage with IV vancomycin and Zosyn.    Interval Problem Update  DFI/OM - pain controlled, culture and pathology pending  Diabetes - -160  Hypertension - sbp 130-140    I have personally seen and examined the patient at bedside. I discussed the plan of care with patient and bedside RN.    Consultants/Specialty  LPS    Code Status  Full Code    Disposition  Patient is not medically cleared.   Anticipate discharge to to home with close outpatient follow-up.  I have placed the appropriate orders for post-discharge needs.    Review of Systems  Review of Systems   Constitutional: Negative for chills, diaphoresis, fever and malaise/fatigue.   HENT: Negative for congestion, hearing loss and sore throat.    Eyes: Negative for blurred vision.   Respiratory: Negative for shortness of breath and wheezing.    Cardiovascular: Negative for chest pain, palpitations and leg swelling.   Gastrointestinal: Negative for abdominal pain, diarrhea, heartburn, nausea and vomiting.   Genitourinary: Negative for dysuria, flank pain and hematuria.   Musculoskeletal: Negative for back pain, joint pain, myalgias and neck pain.   Skin: Negative for rash.   Neurological: Negative for dizziness, sensory change, speech change, focal weakness, weakness and headaches.   Psychiatric/Behavioral: The patient is not nervous/anxious.         Physical Exam  Temp:  [36.8 °C (98.2 °F)-37.3 °C (99.1 °F)] 37.1 °C (98.8 °F)  Pulse:  [82-98] 82  Resp:  [18] 18  BP: (136-146)/(76-83) 141/76  SpO2:  [94 %-96 %] 95 %    Physical Exam  Vitals and nursing note reviewed.    Constitutional:       Appearance: He is obese.   HENT:      Head: Normocephalic and atraumatic.      Nose: No congestion.      Mouth/Throat:      Mouth: Mucous membranes are moist.   Eyes:      Extraocular Movements: Extraocular movements intact.      Conjunctiva/sclera: Conjunctivae normal.   Cardiovascular:      Rate and Rhythm: Normal rate and regular rhythm.   Pulmonary:      Effort: Pulmonary effort is normal.      Breath sounds: Normal breath sounds.   Abdominal:      General: There is no distension.      Tenderness: There is no abdominal tenderness. There is no guarding or rebound.   Musculoskeletal:      Cervical back: No tenderness.      Right lower leg: No edema.      Left lower leg: No edema.      Comments: Right transmetatarsal amputation  Left third toe amputation   Skin:     General: Skin is warm and dry.   Neurological:      General: No focal deficit present.      Mental Status: He is alert and oriented to person, place, and time.      Cranial Nerves: No cranial nerve deficit.         Fluids    Intake/Output Summary (Last 24 hours) at 10/2/2021 1047  Last data filed at 10/2/2021 0839  Gross per 24 hour   Intake 1040 ml   Output 2425 ml   Net -1385 ml       Laboratory  Recent Labs     09/30/21  0458 10/01/21  0329 10/02/21  0347   WBC 9.1 6.6 7.4   RBC 4.05* 3.81* 3.93*   HEMOGLOBIN 11.8* 11.1* 11.4*   HEMATOCRIT 35.7* 33.7* 34.5*   MCV 88.1 88.5 87.8   MCH 29.1 29.1 29.0   MCHC 33.1* 32.9* 33.0*   RDW 45.1 44.3 43.5   PLATELETCT 311 268 247   MPV 10.0 10.3 10.1     Recent Labs     09/30/21  0458 10/01/21  0329 10/02/21  0347   SODIUM 135 136 134*   POTASSIUM 3.9 3.7 4.0   CHLORIDE 102 105 102   CO2 21 22 22   GLUCOSE 220* 120* 150*   BUN 15 9 10   CREATININE 1.15 0.97 0.93   CALCIUM 8.8 7.9* 8.0*     Recent Labs     09/30/21  0458 09/30/21  1916   INR 1.04 1.13               Imaging  DX-FOOT-COMPLETE 3+ LEFT   Final Result         Osseous destruction involving the left third mid and distal  phalanges, in keeping with osteomyelitis.           Assessment/Plan  * Diabetic foot infection (HCC)- (present on admission)  Assessment & Plan  IV Zosyn.  Follow cultures  Pain control  LPS following    Osteomyelitis of left foot (HCC)- (present on admission)  Assessment & Plan  Left third toe amputation through [facility]TP. 9/30/2021  IV Zosyn.  Pain control, wound care  Follow pathology results    Sepsis (HCC)- (present on admission)  Assessment & Plan  This is Sepsis Present on admission  SIRS criteria identified on my evaluation include: Tachycardia, with heart rate greater than 90 BPM and Tachypnea, with respirations greater than 20 per minute  Source is left foot osteomyelitis  Sepsis protocol initiated  Fluid resuscitation ordered per protocol  IV antibiotics as appropriate for source of sepsis: Zosyn and vancomycin  While organ dysfunction may be noted elsewhere in this problem list or in the chart, degree of organ dysfunction does not meet CMS criteria for severe sepsis    Hypomagnesemia- (present on admission)  Assessment & Plan  IV Mg given  Follow level    History of COVID-19- (present on admission)  Assessment & Plan  Considered noninfectious    History of non-ST elevation myocardial infarction (NSTEMI)- (present on admission)  Assessment & Plan  ASA, Corer, Lipitor    Presence of stent in coronary artery- (present on admission)  Assessment & Plan  ASA, Coreg, Lipitor    Type 2 diabetes mellitus with hyperglycemia, with long-term current use of insulin (HCC)- (present on admission)  Assessment & Plan  Semglee, SSI    Essential hypertension- (present on admission)  Assessment & Plan  Carvedilol.    S/P transmetatarsal amputation of foot, right (HCC)- (present on admission)  Assessment & Plan  LPS following    Normocytic anemia- (present on admission)  Assessment & Plan  follow cbc    Lower limb diabetic mononeuropathy (HCC)- (present on admission)  Assessment & Plan  Gabapentin.       VTE prophylaxis: heparin  ppx    I have performed a physical exam and reviewed and updated ROS and Plan today (10/2/2021). In review of yesterday's note (10/1/2021), there are no changes except as documented above.

## 2021-10-03 PROBLEM — E87.6 HYPOKALEMIA: Status: ACTIVE | Noted: 2021-10-03

## 2021-10-03 LAB
ANION GAP SERPL CALC-SCNC: 8 MMOL/L (ref 7–16)
BACTERIA TISS AEROBE CULT: ABNORMAL
BASOPHILS # BLD AUTO: 0.7 % (ref 0–1.8)
BASOPHILS # BLD: 0.05 K/UL (ref 0–0.12)
BUN SERPL-MCNC: 10 MG/DL (ref 8–22)
CALCIUM SERPL-MCNC: 7.9 MG/DL (ref 8.5–10.5)
CHLORIDE SERPL-SCNC: 103 MMOL/L (ref 96–112)
CO2 SERPL-SCNC: 23 MMOL/L (ref 20–33)
CREAT SERPL-MCNC: 0.93 MG/DL (ref 0.5–1.4)
EOSINOPHIL # BLD AUTO: 0.1 K/UL (ref 0–0.51)
EOSINOPHIL NFR BLD: 1.4 % (ref 0–6.9)
ERYTHROCYTE [DISTWIDTH] IN BLOOD BY AUTOMATED COUNT: 42.3 FL (ref 35.9–50)
GLUCOSE BLD-MCNC: 183 MG/DL (ref 65–99)
GLUCOSE BLD-MCNC: 200 MG/DL (ref 65–99)
GLUCOSE BLD-MCNC: 208 MG/DL (ref 65–99)
GLUCOSE BLD-MCNC: 215 MG/DL (ref 65–99)
GLUCOSE SERPL-MCNC: 215 MG/DL (ref 65–99)
GRAM STN SPEC: ABNORMAL
HCT VFR BLD AUTO: 34.8 % (ref 42–52)
HGB BLD-MCNC: 11.5 G/DL (ref 14–18)
IMM GRANULOCYTES # BLD AUTO: 0.03 K/UL (ref 0–0.11)
IMM GRANULOCYTES NFR BLD AUTO: 0.4 % (ref 0–0.9)
LYMPHOCYTES # BLD AUTO: 1.82 K/UL (ref 1–4.8)
LYMPHOCYTES NFR BLD: 24.9 % (ref 22–41)
MAGNESIUM SERPL-MCNC: 2 MG/DL (ref 1.5–2.5)
MCH RBC QN AUTO: 28.7 PG (ref 27–33)
MCHC RBC AUTO-ENTMCNC: 33 G/DL (ref 33.7–35.3)
MCV RBC AUTO: 86.8 FL (ref 81.4–97.8)
MONOCYTES # BLD AUTO: 0.87 K/UL (ref 0–0.85)
MONOCYTES NFR BLD AUTO: 11.9 % (ref 0–13.4)
NEUTROPHILS # BLD AUTO: 4.43 K/UL (ref 1.82–7.42)
NEUTROPHILS NFR BLD: 60.7 % (ref 44–72)
NRBC # BLD AUTO: 0 K/UL
NRBC BLD-RTO: 0 /100 WBC
PLATELET # BLD AUTO: 290 K/UL (ref 164–446)
PMV BLD AUTO: 10.1 FL (ref 9–12.9)
POTASSIUM SERPL-SCNC: 3.5 MMOL/L (ref 3.6–5.5)
RBC # BLD AUTO: 4.01 M/UL (ref 4.7–6.1)
SIGNIFICANT IND 70042: ABNORMAL
SITE SITE: ABNORMAL
SODIUM SERPL-SCNC: 134 MMOL/L (ref 135–145)
SOURCE SOURCE: ABNORMAL
WBC # BLD AUTO: 7.3 K/UL (ref 4.8–10.8)

## 2021-10-03 PROCEDURE — 700105 HCHG RX REV CODE 258: Performed by: STUDENT IN AN ORGANIZED HEALTH CARE EDUCATION/TRAINING PROGRAM

## 2021-10-03 PROCEDURE — 82962 GLUCOSE BLOOD TEST: CPT | Mod: 91

## 2021-10-03 PROCEDURE — A9270 NON-COVERED ITEM OR SERVICE: HCPCS | Performed by: STUDENT IN AN ORGANIZED HEALTH CARE EDUCATION/TRAINING PROGRAM

## 2021-10-03 PROCEDURE — 99233 SBSQ HOSP IP/OBS HIGH 50: CPT | Performed by: FAMILY MEDICINE

## 2021-10-03 PROCEDURE — 85025 COMPLETE CBC W/AUTO DIFF WBC: CPT

## 2021-10-03 PROCEDURE — 770001 HCHG ROOM/CARE - MED/SURG/GYN PRIV*

## 2021-10-03 PROCEDURE — 83735 ASSAY OF MAGNESIUM: CPT

## 2021-10-03 PROCEDURE — 80048 BASIC METABOLIC PNL TOTAL CA: CPT

## 2021-10-03 PROCEDURE — 700111 HCHG RX REV CODE 636 W/ 250 OVERRIDE (IP): Performed by: STUDENT IN AN ORGANIZED HEALTH CARE EDUCATION/TRAINING PROGRAM

## 2021-10-03 PROCEDURE — 700102 HCHG RX REV CODE 250 W/ 637 OVERRIDE(OP): Performed by: STUDENT IN AN ORGANIZED HEALTH CARE EDUCATION/TRAINING PROGRAM

## 2021-10-03 PROCEDURE — 36415 COLL VENOUS BLD VENIPUNCTURE: CPT

## 2021-10-03 RX ORDER — POTASSIUM CHLORIDE 20 MEQ/1
20 TABLET, EXTENDED RELEASE ORAL DAILY
Status: DISCONTINUED | OUTPATIENT
Start: 2021-10-03 | End: 2021-10-04 | Stop reason: HOSPADM

## 2021-10-03 RX ORDER — INSULIN LISPRO 100 [IU]/ML
0-12 INJECTION, SOLUTION INTRAVENOUS; SUBCUTANEOUS
Status: DISCONTINUED | OUTPATIENT
Start: 2021-10-03 | End: 2021-10-04 | Stop reason: HOSPADM

## 2021-10-03 RX ORDER — DEXTROSE MONOHYDRATE 25 G/50ML
50 INJECTION, SOLUTION INTRAVENOUS
Status: DISCONTINUED | OUTPATIENT
Start: 2021-10-03 | End: 2021-10-04 | Stop reason: HOSPADM

## 2021-10-03 RX ADMIN — INSULIN LISPRO 4 UNITS: 100 INJECTION, SOLUTION INTRAVENOUS; SUBCUTANEOUS at 16:40

## 2021-10-03 RX ADMIN — INSULIN LISPRO 2 UNITS: 100 INJECTION, SOLUTION INTRAVENOUS; SUBCUTANEOUS at 06:20

## 2021-10-03 RX ADMIN — ATORVASTATIN CALCIUM 80 MG: 40 TABLET, FILM COATED ORAL at 21:25

## 2021-10-03 RX ADMIN — HEPARIN SODIUM 5000 UNITS: 5000 INJECTION, SOLUTION INTRAVENOUS; SUBCUTANEOUS at 21:26

## 2021-10-03 RX ADMIN — CHOLESTYRAMINE 4 G: 4 POWDER, FOR SUSPENSION ORAL at 17:22

## 2021-10-03 RX ADMIN — PIPERACILLIN AND TAZOBACTAM 3.38 G: 3; .375 INJECTION, POWDER, LYOPHILIZED, FOR SOLUTION INTRAVENOUS; PARENTERAL at 06:02

## 2021-10-03 RX ADMIN — GABAPENTIN 100 MG: 100 CAPSULE ORAL at 21:25

## 2021-10-03 RX ADMIN — CARVEDILOL 6.25 MG: 6.25 TABLET, FILM COATED ORAL at 21:25

## 2021-10-03 RX ADMIN — OMEPRAZOLE 20 MG: 20 CAPSULE, DELAYED RELEASE ORAL at 06:03

## 2021-10-03 RX ADMIN — HEPARIN SODIUM 5000 UNITS: 5000 INJECTION, SOLUTION INTRAVENOUS; SUBCUTANEOUS at 13:34

## 2021-10-03 RX ADMIN — HEPARIN SODIUM 5000 UNITS: 5000 INJECTION, SOLUTION INTRAVENOUS; SUBCUTANEOUS at 06:03

## 2021-10-03 RX ADMIN — CHOLESTYRAMINE 4 G: 4 POWDER, FOR SUSPENSION ORAL at 10:01

## 2021-10-03 RX ADMIN — PIPERACILLIN AND TAZOBACTAM 3.38 G: 3; .375 INJECTION, POWDER, LYOPHILIZED, FOR SOLUTION INTRAVENOUS; PARENTERAL at 13:34

## 2021-10-03 RX ADMIN — PIPERACILLIN AND TAZOBACTAM 3.38 G: 3; .375 INJECTION, POWDER, LYOPHILIZED, FOR SOLUTION INTRAVENOUS; PARENTERAL at 21:25

## 2021-10-03 RX ADMIN — INSULIN LISPRO 1 UNITS: 100 INJECTION, SOLUTION INTRAVENOUS; SUBCUTANEOUS at 12:05

## 2021-10-03 RX ADMIN — ASPIRIN 81 MG: 81 TABLET, COATED ORAL at 06:03

## 2021-10-03 ASSESSMENT — ENCOUNTER SYMPTOMS
WHEEZING: 0
DIAPHORESIS: 0
BLURRED VISION: 0
ABDOMINAL PAIN: 0
BACK PAIN: 0
SPEECH CHANGE: 0
CHILLS: 0
HEARTBURN: 0
SORE THROAT: 0
PALPITATIONS: 0
NECK PAIN: 0
MYALGIAS: 0
SENSORY CHANGE: 0
HEADACHES: 0
VOMITING: 0
WEAKNESS: 0
NERVOUS/ANXIOUS: 0
DIARRHEA: 0
DIZZINESS: 0
FEVER: 0
NAUSEA: 0
SHORTNESS OF BREATH: 0
FOCAL WEAKNESS: 0
FLANK PAIN: 0

## 2021-10-03 ASSESSMENT — PAIN DESCRIPTION - PAIN TYPE
TYPE: ACUTE PAIN
TYPE: ACUTE PAIN
TYPE: SURGICAL PAIN
TYPE: ACUTE PAIN
TYPE: SURGICAL PAIN

## 2021-10-03 NOTE — PROGRESS NOTES
Assessment complete.  A&O x 4. Patient calls appropriately.  Patient ambulates with SBA with a FWW. Ortho shoe on when OOB.  Patient has 0/10 pain. Pain managed with prescribed medications.  Denies N&V. Tolerating DM diet.  L toe amputation dressing CDI. R tibial dressing CDI  + void, + flatus, + BMx1.  Patient denies SOB.  SCD's off.    Review plan with of care with patient. Call light and personal belongings within reach. Hourly rounding in place. All needs met at this time.

## 2021-10-03 NOTE — PROGRESS NOTES
Patient A+Ox4, on room air, VSS. Denies pain at this time. Left foot in dressing C/D/I, elevated on pillow. Patient has good appetite, no N/V, IV abx infusing at this time. Using call bell approp, bed locked, whiteboard updated

## 2021-10-03 NOTE — PROGRESS NOTES
Hospital Medicine Daily Progress Note    Date of Service  10/3/2021    Chief Complaint  Beni Moore is a 51 y.o. male admitted 9/30/2021 with diabetic foot infection.    Hospital Course  Admitted with diabetic foot infection and left third toe osteomyelitis.  LPS was consulted on the case, patient underwent Left third toe amputation through [facility]TP on 9/30/2021.  He was started on empiric coverage with IV vancomycin and Zosyn.    Interval Problem Update  DFI/OM - pain controlled, culture shows Staphylococcus simulans  Diabetes - -200  Hypertension - sbp 120-160  Low potassium    I have personally seen and examined the patient at bedside. I discussed the plan of care with patient and bedside RN.    Consultants/Specialty  LPS    Code Status  Full Code    Disposition  Patient is not medically cleared.   Anticipate discharge to to home with close outpatient follow-up.  I have placed the appropriate orders for post-discharge needs.    Review of Systems  Review of Systems   Constitutional: Negative for chills, diaphoresis, fever and malaise/fatigue.   HENT: Negative for congestion, hearing loss and sore throat.    Eyes: Negative for blurred vision.   Respiratory: Negative for shortness of breath and wheezing.    Cardiovascular: Negative for chest pain, palpitations and leg swelling.   Gastrointestinal: Negative for abdominal pain, diarrhea, heartburn, nausea and vomiting.   Genitourinary: Negative for dysuria, flank pain and hematuria.   Musculoskeletal: Negative for back pain, joint pain, myalgias and neck pain.   Skin: Negative for rash.   Neurological: Negative for dizziness, sensory change, speech change, focal weakness, weakness and headaches.   Psychiatric/Behavioral: The patient is not nervous/anxious.         Physical Exam  Temp:  [36.3 °C (97.3 °F)-37.2 °C (99 °F)] 37 °C (98.6 °F)  Pulse:  [80-87] 86  Resp:  [16-18] 18  BP: (129-160)/(80-93) 160/93  SpO2:  [94 %-97 %] 97 %    Physical Exam  Vitals and  nursing note reviewed.   Constitutional:       Appearance: He is obese.   HENT:      Head: Normocephalic and atraumatic.      Nose: No congestion.      Mouth/Throat:      Mouth: Mucous membranes are moist.   Eyes:      Extraocular Movements: Extraocular movements intact.      Conjunctiva/sclera: Conjunctivae normal.   Cardiovascular:      Rate and Rhythm: Normal rate and regular rhythm.   Pulmonary:      Effort: Pulmonary effort is normal.      Breath sounds: Normal breath sounds.   Abdominal:      General: There is no distension.      Tenderness: There is no abdominal tenderness. There is no guarding or rebound.   Musculoskeletal:      Cervical back: No tenderness.      Right lower leg: No edema.      Left lower leg: No edema.      Comments: Right transmetatarsal amputation  Left third toe amputation   Skin:     General: Skin is warm and dry.   Neurological:      General: No focal deficit present.      Mental Status: He is alert and oriented to person, place, and time.      Cranial Nerves: No cranial nerve deficit.         Fluids    Intake/Output Summary (Last 24 hours) at 10/3/2021 1437  Last data filed at 10/3/2021 0415  Gross per 24 hour   Intake 340 ml   Output 1225 ml   Net -885 ml       Laboratory  Recent Labs     10/01/21  0329 10/02/21  0347 10/03/21  0040   WBC 6.6 7.4 7.3   RBC 3.81* 3.93* 4.01*   HEMOGLOBIN 11.1* 11.4* 11.5*   HEMATOCRIT 33.7* 34.5* 34.8*   MCV 88.5 87.8 86.8   MCH 29.1 29.0 28.7   MCHC 32.9* 33.0* 33.0*   RDW 44.3 43.5 42.3   PLATELETCT 268 247 290   MPV 10.3 10.1 10.1     Recent Labs     10/01/21  0329 10/02/21  0347 10/03/21  0040   SODIUM 136 134* 134*   POTASSIUM 3.7 4.0 3.5*   CHLORIDE 105 102 103   CO2 22 22 23   GLUCOSE 120* 150* 215*   BUN 9 10 10   CREATININE 0.97 0.93 0.93   CALCIUM 7.9* 8.0* 7.9*     Recent Labs     09/30/21  1916   INR 1.13               Imaging  DX-FOOT-COMPLETE 3+ LEFT   Final Result         Osseous destruction involving the left third mid and distal  phalanges, in keeping with osteomyelitis.           Assessment/Plan  * Diabetic foot infection (HCC)- (present on admission)  Assessment & Plan  IV Zosyn.  Follow cultures  Pain control  LPS following    Osteomyelitis of left foot (HCC)- (present on admission)  Assessment & Plan  Left third toe amputation through MTP. 9/30/2021  IV Zosyn.  Pain control, wound care  Follow pathology results    Sepsis (HCC)- (present on admission)  Assessment & Plan  This is Sepsis Present on admission  SIRS criteria identified on my evaluation include: Tachycardia, with heart rate greater than 90 BPM and Tachypnea, with respirations greater than 20 per minute  Source is left foot osteomyelitis  Sepsis protocol initiated  Fluid resuscitation ordered per protocol  IV antibiotics as appropriate for source of sepsis: Zosyn and vancomycin  While organ dysfunction may be noted elsewhere in this problem list or in the chart, degree of organ dysfunction does not meet CMS criteria for severe sepsis    Hypomagnesemia- (present on admission)  Assessment & Plan  IV Mg given  Follow level    History of COVID-19- (present on admission)  Assessment & Plan  Considered noninfectious    History of non-ST elevation myocardial infarction (NSTEMI)- (present on admission)  Assessment & Plan  ASA, Corer, Lipitor    Presence of stent in coronary artery- (present on admission)  Assessment & Plan  ASA, Coreg, Lipitor    Type 2 diabetes mellitus with hyperglycemia, with long-term current use of insulin (HCC)- (present on admission)  Assessment & Plan  Increase Semglee to 15 units  Adjust SSI    Essential hypertension- (present on admission)  Assessment & Plan  Carvedilol.    S/P transmetatarsal amputation of foot, right (HCC)- (present on admission)  Assessment & Plan  LPS following    Normocytic anemia- (present on admission)  Assessment & Plan  follow cbc    Hypokalemia- (present on admission)  Assessment & Plan  Start Kdur, follow bmp    Lower limb diabetic  mononeuropathy (HCC)- (present on admission)  Assessment & Plan  Gabapentin.       VTE prophylaxis: heparin ppx    I have performed a physical exam and reviewed and updated ROS and Plan today (10/3/2021). In review of yesterday's note (10/2/2021), there are no changes except as documented above.

## 2021-10-03 NOTE — CARE PLAN
The patient is Stable - Low risk of patient condition declining or worsening    Shift Goals  Clinical Goals: Mobilization, dressing care  Patient Goals: Discharge planning    Progress made toward(s) clinical / shift goals:    Problem: Fall Risk  Goal: Patient will remain free from falls  Outcome: Progressing     Problem: Knowledge Deficit - Standard  Goal: Patient and family/care givers will demonstrate understanding of plan of care, disease process/condition, diagnostic tests and medications  Outcome: Progressing     Problem: Hemodynamics  Goal: Patient's hemodynamics, fluid balance and neurologic status will be stable or improve  Outcome: Progressing

## 2021-10-04 VITALS
SYSTOLIC BLOOD PRESSURE: 146 MMHG | RESPIRATION RATE: 17 BRPM | DIASTOLIC BLOOD PRESSURE: 90 MMHG | BODY MASS INDEX: 30.02 KG/M2 | OXYGEN SATURATION: 95 % | HEART RATE: 82 BPM | TEMPERATURE: 97.6 F | WEIGHT: 221.34 LBS

## 2021-10-04 LAB
ANION GAP SERPL CALC-SCNC: 9 MMOL/L (ref 7–16)
BASOPHILS # BLD AUTO: 1 % (ref 0–1.8)
BASOPHILS # BLD: 0.06 K/UL (ref 0–0.12)
BUN SERPL-MCNC: 9 MG/DL (ref 8–22)
CALCIUM SERPL-MCNC: 8.3 MG/DL (ref 8.5–10.5)
CHLORIDE SERPL-SCNC: 106 MMOL/L (ref 96–112)
CO2 SERPL-SCNC: 23 MMOL/L (ref 20–33)
CREAT SERPL-MCNC: 0.8 MG/DL (ref 0.5–1.4)
EOSINOPHIL # BLD AUTO: 0.12 K/UL (ref 0–0.51)
EOSINOPHIL NFR BLD: 1.9 % (ref 0–6.9)
ERYTHROCYTE [DISTWIDTH] IN BLOOD BY AUTOMATED COUNT: 43.6 FL (ref 35.9–50)
GLUCOSE BLD-MCNC: 186 MG/DL (ref 65–99)
GLUCOSE BLD-MCNC: 196 MG/DL (ref 65–99)
GLUCOSE SERPL-MCNC: 205 MG/DL (ref 65–99)
HCT VFR BLD AUTO: 37 % (ref 42–52)
HGB BLD-MCNC: 12 G/DL (ref 14–18)
IMM GRANULOCYTES # BLD AUTO: 0.02 K/UL (ref 0–0.11)
IMM GRANULOCYTES NFR BLD AUTO: 0.3 % (ref 0–0.9)
LYMPHOCYTES # BLD AUTO: 1.99 K/UL (ref 1–4.8)
LYMPHOCYTES NFR BLD: 31.9 % (ref 22–41)
MAGNESIUM SERPL-MCNC: 1.9 MG/DL (ref 1.5–2.5)
MCH RBC QN AUTO: 28.7 PG (ref 27–33)
MCHC RBC AUTO-ENTMCNC: 32.4 G/DL (ref 33.7–35.3)
MCV RBC AUTO: 88.5 FL (ref 81.4–97.8)
MONOCYTES # BLD AUTO: 0.66 K/UL (ref 0–0.85)
MONOCYTES NFR BLD AUTO: 10.6 % (ref 0–13.4)
NEUTROPHILS # BLD AUTO: 3.39 K/UL (ref 1.82–7.42)
NEUTROPHILS NFR BLD: 54.3 % (ref 44–72)
NRBC # BLD AUTO: 0 K/UL
NRBC BLD-RTO: 0 /100 WBC
PHOSPHATE SERPL-MCNC: 2.6 MG/DL (ref 2.5–4.5)
PLATELET # BLD AUTO: 288 K/UL (ref 164–446)
PMV BLD AUTO: 9.9 FL (ref 9–12.9)
POTASSIUM SERPL-SCNC: 3.9 MMOL/L (ref 3.6–5.5)
RBC # BLD AUTO: 4.18 M/UL (ref 4.7–6.1)
SODIUM SERPL-SCNC: 138 MMOL/L (ref 135–145)
WBC # BLD AUTO: 6.2 K/UL (ref 4.8–10.8)

## 2021-10-04 PROCEDURE — 99239 HOSP IP/OBS DSCHRG MGMT >30: CPT | Performed by: FAMILY MEDICINE

## 2021-10-04 PROCEDURE — A9270 NON-COVERED ITEM OR SERVICE: HCPCS | Performed by: STUDENT IN AN ORGANIZED HEALTH CARE EDUCATION/TRAINING PROGRAM

## 2021-10-04 PROCEDURE — 84100 ASSAY OF PHOSPHORUS: CPT

## 2021-10-04 PROCEDURE — 700111 HCHG RX REV CODE 636 W/ 250 OVERRIDE (IP): Performed by: STUDENT IN AN ORGANIZED HEALTH CARE EDUCATION/TRAINING PROGRAM

## 2021-10-04 PROCEDURE — 82962 GLUCOSE BLOOD TEST: CPT | Mod: 91

## 2021-10-04 PROCEDURE — 80048 BASIC METABOLIC PNL TOTAL CA: CPT

## 2021-10-04 PROCEDURE — 85025 COMPLETE CBC W/AUTO DIFF WBC: CPT

## 2021-10-04 PROCEDURE — 700105 HCHG RX REV CODE 258: Performed by: STUDENT IN AN ORGANIZED HEALTH CARE EDUCATION/TRAINING PROGRAM

## 2021-10-04 PROCEDURE — 36415 COLL VENOUS BLD VENIPUNCTURE: CPT

## 2021-10-04 PROCEDURE — A9270 NON-COVERED ITEM OR SERVICE: HCPCS | Performed by: FAMILY MEDICINE

## 2021-10-04 PROCEDURE — 83735 ASSAY OF MAGNESIUM: CPT

## 2021-10-04 PROCEDURE — 700102 HCHG RX REV CODE 250 W/ 637 OVERRIDE(OP): Performed by: FAMILY MEDICINE

## 2021-10-04 PROCEDURE — 700102 HCHG RX REV CODE 250 W/ 637 OVERRIDE(OP): Performed by: STUDENT IN AN ORGANIZED HEALTH CARE EDUCATION/TRAINING PROGRAM

## 2021-10-04 RX ORDER — AMOXICILLIN AND CLAVULANATE POTASSIUM 875; 125 MG/1; MG/1
1 TABLET, FILM COATED ORAL 2 TIMES DAILY
Qty: 14 TABLET | Refills: 0 | Status: SHIPPED | OUTPATIENT
Start: 2021-10-05 | End: 2021-10-12

## 2021-10-04 RX ADMIN — PIPERACILLIN AND TAZOBACTAM 3.38 G: 3; .375 INJECTION, POWDER, LYOPHILIZED, FOR SOLUTION INTRAVENOUS; PARENTERAL at 05:36

## 2021-10-04 RX ADMIN — OMEPRAZOLE 20 MG: 20 CAPSULE, DELAYED RELEASE ORAL at 05:35

## 2021-10-04 RX ADMIN — CHOLESTYRAMINE 4 G: 4 POWDER, FOR SUSPENSION ORAL at 09:57

## 2021-10-04 RX ADMIN — HEPARIN SODIUM 5000 UNITS: 5000 INJECTION, SOLUTION INTRAVENOUS; SUBCUTANEOUS at 05:37

## 2021-10-04 RX ADMIN — INSULIN LISPRO 2 UNITS: 100 INJECTION, SOLUTION INTRAVENOUS; SUBCUTANEOUS at 05:34

## 2021-10-04 RX ADMIN — CARVEDILOL 6.25 MG: 6.25 TABLET, FILM COATED ORAL at 08:51

## 2021-10-04 RX ADMIN — POTASSIUM CHLORIDE 20 MEQ: 1500 TABLET, EXTENDED RELEASE ORAL at 05:37

## 2021-10-04 RX ADMIN — ASPIRIN 81 MG: 81 TABLET, COATED ORAL at 05:35

## 2021-10-04 RX ADMIN — INSULIN LISPRO 2 UNITS: 100 INJECTION, SOLUTION INTRAVENOUS; SUBCUTANEOUS at 12:17

## 2021-10-04 ASSESSMENT — PAIN DESCRIPTION - PAIN TYPE: TYPE: ACUTE PAIN

## 2021-10-04 NOTE — DISCHARGE SUMMARY
"Discharge Summary    CHIEF COMPLAINT ON ADMISSION  Chief Complaint   Patient presents with   • Wound Check     Patient reports he has an infected middle toe that started as a cut but now is \"smelling so bad.\" Patient has a history of diabetes with r sided toe amputations. Patient reports his blood sugar was running high last month after taking steroids for Covid.        Reason for Admission  Wound Check     Admission Date  9/30/2021    CODE STATUS  Full Code    HPI & HOSPITAL COURSE  This is a 51 y.o. male here with  diabetic foot infection and left third toe osteomyelitis.  LPS was consulted on the case, patient underwent Left third toe amputation through [facility] on 9/30/2021.  He was started on empiric coverage with IV vancomycin and Zosyn.  His MRSA PCR was noted to be negative.  Antibiotics were changed to IV Unasyn.  Cultures did show MSSA and Streptococcus.  [facility] has cleared him for discharge with outpatient follow-up.    Therefore, he is discharged in good and stable condition to home with close outpatient follow-up.    The patient met 2-midnight criteria for an inpatient stay at the time of discharge.    Discharge Date  10/4/2021    FOLLOW UP ITEMS POST DISCHARGE  Follow-up with outpatient wound clinic    DISCHARGE DIAGNOSES  Principal Problem:    Diabetic foot infection (HCC) POA: Yes  Active Problems:    Sepsis (HCC) POA: Yes    Osteomyelitis of left foot (HCC) POA: Yes    Normocytic anemia POA: Yes    S/P transmetatarsal amputation of foot, right (HCC) POA: Yes    Essential hypertension POA: Yes    Type 2 diabetes mellitus with hyperglycemia, with long-term current use of insulin (HCC) POA: Yes    Presence of stent in coronary artery POA: Yes    History of non-ST elevation myocardial infarction (NSTEMI) POA: Yes    History of COVID-19 POA: Yes    Hypomagnesemia POA: Yes    Hypokalemia POA: Yes    Lower limb diabetic mononeuropathy (HCC) POA: Yes  Resolved Problems:    * No resolved hospital problems. " *      FOLLOW UP  Future Appointments   Date Time Provider Department Center   10/18/2021  8:30 AM [facility]     No follow-up provider specified.    MEDICATIONS ON DISCHARGE     Medication List      START taking these medications      Instructions   amoxicillin-clavulanate 875-125 MG Tabs  Start taking on: October 5, 2021  Commonly known as: AUGMENTIN   Take 1 Tablet by mouth 2 times a day for 7 days.  Dose: 1 Tablet        CONTINUE taking these medications      Instructions   aspirin 81 MG EC tablet   Take 1 Tablet by mouth every day.  Dose: 81 mg     atorvastatin 80 MG tablet  Commonly known as: LIPITOR   Take 1 tablet by mouth every evening.  Dose: 80 mg     carvedilol 6.25 MG Tabs  Commonly known as: COREG   Take 1 Tablet by mouth 2 times a day.  Dose: 6.25 mg     cholestyramine 4 GM/DOSE powder  Commonly known as: QUESTRAN   Take 4 g by mouth 2 times a day. Mixed with 4-6 oz water.  Pt takes at 1000 and 1700.   Hold all other PO medications an hour before and 4 hours after dosing.  Dose: 4 g     gabapentin 100 MG Caps  Commonly known as: NEURONTIN   Take 100 mg by mouth at bedtime.  Dose: 100 mg     Jardiance 10 MG Tabs  Generic drug: Empagliflozin   Take 10 mg by mouth every day.  Dose: 10 mg     Lantus 100 UNIT/ML Soln  Generic drug: insulin glargine   Inject 10-20 Units under the skin every evening. Units change based upon elevated blood sugar  Dose: 10-20 Units     omeprazole 20 MG delayed-release capsule  Commonly known as: PRILOSEC   Take 20 mg by mouth every morning.  Dose: 20 mg            Allergies  No Known Allergies    DIET  Orders Placed This Encounter   Procedures   • Diet Order Diet: Consistent CHO (Diabetic)     Standing Status:   Standing     Number of Occurrences:   1     Order Specific Question:   Diet:     Answer:   Consistent CHO (Diabetic) [4]       ACTIVITY  As tolerated.  Weight bearing as tolerated    CONSULTATIONS  LPS    PROCEDURES  Left third toe amputation  through [facility]. 9/30/2021    LABORATORY  Lab Results   Component Value Date    SODIUM 138 10/04/2021    POTASSIUM 3.9 10/04/2021    CHLORIDE 106 10/04/2021    CO2 23 10/04/2021    GLUCOSE 205 (H) 10/04/2021    BUN 9 10/04/2021    CREATININE 0.80 10/04/2021        Lab Results   Component Value Date    WBC 6.2 10/04/2021    HEMOGLOBIN 12.0 (L) 10/04/2021    HEMATOCRIT 37.0 (L) 10/04/2021    PLATELETCT 288 10/04/2021        Total time of the discharge process exceeds 35 minutes.

## 2021-10-04 NOTE — DISCHARGE INSTRUCTIONS
Discharge Instructions    Discharged to home by car with relative. Discharged via wheelchair, hospital escort: Yes.  Special equipment needed: Not Applicable    Be sure to schedule a follow-up appointment with your primary care doctor or any specialists as instructed.     Discharge Plan:   Diet Plan: Discussed  Activity Level: Discussed  Confirmed Follow up Appointment: Patient to Call and Schedule Appointment  Confirmed Symptoms Management: Discussed  Medication Reconciliation Updated: Yes  Influenza Vaccine Indication: Patient Refuses    I understand that a diet low in cholesterol, fat, and sodium is recommended for good health. Unless I have been given specific instructions below for another diet, I accept this instruction as my diet prescription.   Other diet: diet as tolerated    Special Instructions: None    · Is patient discharged on Warfarin / Coumadin?   No     Depression / Suicide Risk    As you are discharged from this [facility] facility, it is important to learn how to keep safe from harming yourself.    Recognize the warning signs:  · Abrupt changes in personality, positive or negative- including increase in energy   · Giving away possessions  · Change in eating patterns- significant weight changes-  positive or negative  · Change in sleeping patterns- unable to sleep or sleeping all the time   · Unwillingness or inability to communicate  · Depression  · Unusual sadness, discouragement and loneliness  · Talk of wanting to die  · Neglect of personal appearance   · Rebelliousness- reckless behavior  · Withdrawal from people/activities they love  · Confusion- inability to concentrate     If you or a loved one observes any of these behaviors or has concerns about self-harm, here's what you can do:  · Talk about it- your feelings and reasons for harming yourself  · Remove any means that you might use to hurt yourself (examples: pills, rope, extension cords, firearm)  · Get professional help from the  community (Mental Health, Substance Abuse, psychological counseling)  · Do not be alone:Call your Safe Contact- someone whom you trust who will be there for you.  · Call your local CRISIS HOTLINE 661-7341 or 668-491-7859  · Call your local Children's Mobile Crisis Response Team [place] (562) 389-8141 or www.Papirus  · Call the toll free National Suicide Prevention Hotlines   · National Suicide Prevention Lifeline 790-137-DTQR (7052)  · National Hope Line Network 800-SUICIDE (959-4654)      Toe Amputation, Care After  This sheet gives you information about how to care for yourself after your procedure. Your health care provider may also give you more specific instructions. If you have problems or questions, contact your health care provider.  What can I expect after the procedure?  After the procedure, it is common to have some pain. Pain usually improves within a week.  Follow these instructions at home:  Medicines  · Take over-the-counter and prescription medicines only as told by your health care provider.  · If you were prescribed an antibiotic medicine, take it as told by your health care provider. Do not stop taking the antibiotic even if you start to feel better.  · Ask your health care provider if the medicine prescribed to you:  ? Requires you to avoid driving or using heavy machinery.  ? Can cause constipation. You may need to take actions to prevent or treat constipation, such as:  § Drink enough fluid to keep your urine pale yellow.  § Take over-the-counter or prescription medicines.  § Eat foods that are high in fiber, such as beans, whole grains, and fresh fruits and vegetables.  § Limit foods that are high in fat and processed sugars, such as fried or sweet foods.  Managing pain, stiffness, and swelling    · If directed, put ice on the painful area.  ? Put ice in a plastic bag.  ? Place a towel between your skin and the bag.  ? Leave the ice on for 20 minutes, 2-3 times a day.  · Move  your other toes often to reduce stiffness and swelling.  · Raise (elevate) your foot above the level of your heart while you are sitting or lying down.  Incision care         · Follow instructions from your health care provider about how to take care of your incision. Make sure you:  ? Wash your hands with soap and water before and after you change your bandage (dressing). If soap and water are not available, use hand .  ? Change your dressing as told by your health care provider.  ? Leave stitches (sutures), skin glue, or adhesive strips in place. These skin closures may need to stay in place for 2 weeks or longer. If adhesive strip edges start to loosen and curl up, you may trim the loose edges. Do not remove adhesive strips completely unless your health care provider tells you to do that.  · Check your incision area every day for signs of infection. Check for:  ? More redness, swelling, or pain.  ? Fluid or blood.  ? Warmth.  ? Pus or a bad smell.  Bathing  · Do not take baths, swim, use a hot tub, or soak your foot until your health care provider approves. Ask your health care provider if you may take showers. You may only be allowed to take sponge baths.  · If your dressing has been removed, you may wash your skin with warm water and soap.  Activity  · Rest as told by your health care provider.  · Avoid sitting for a long time without moving. Get up to take short walks every 1-2 hours. This is important to improve blood flow and breathing. Ask for help if you feel weak or unsteady.  · If you have been given crutches, use them as told by your health care provider.  · Do exercises as told by your health care provider.  · Return to your normal activities as told by your health care provider. Ask your health care provider what activities are safe for you.  General instructions  · Do not drive for 24 hours if you were given a sedative during your procedure.  · Do not use any products that contain nicotine  or tobacco, such as cigarettes, e-cigarettes, and chewing tobacco. If you need help quitting, ask your health care provider.  · Ask your health care provider about wearing supportive shoes or using inserts to help with your balance when walking.  · If you have diabetes, keep your blood sugar under good control and check your feet daily for sores or open areas.  · Keep all follow-up visits as told by your health care provider. This is important.  Contact a health care provider if:  · You have signs of infection at your incision, such as:  ? Redness, swelling, or pain.  ? Fluid or blood.  ? Warmth.  ? Pus or a bad smell.  · You have a fever or chills.  · Your dressing is soaked with blood.  · Your sutures tear or they separate.  · You have numbness or tingling in your toes or foot.  · Your foot is cool or pale, or it changes color.  · Your pain does not improve after you take your medicine.  Get help right away if you have:  · Pain or swelling near your incision that gets worse or does not go away.  · Red streaks on your skin near your toes, foot, or leg.  · Pain in your calf or behind your knee.  · Shortness of breath.  · Chest pain.  Summary  · After the procedure, it is common to have some pain. Pain usually improves within a week.  · If you were prescribed an antibiotic medicine, take it as told by your health care provider. Do not stop taking the antibiotic even if you start to feel better.  · Change your dressing as told by your health care provider.  · Contact a health care provider if you have signs of infection.  · Keep all follow-up visits as told by your health care provider. This is important.  This information is not intended to replace advice given to you by your health care provider. Make sure you discuss any questions you have with your health care provider.  Document Released: 11/28/2016 Document Revised: 04/08/2020 Document Reviewed: 12/10/2019  Elsevier Patient Education © 2020 [employer]

## 2021-10-04 NOTE — PROGRESS NOTES
Pt AA&Ox4. No c/o pain, n/v, or SOB. On RA. N/t to bilateral lower extremities. Baseline neuropathy. Pt upself with steady gait. Ortho shoe at bedside. Left foot dressing in place. Due to be changed today. Right shin dressing intact. Due to be changed today. +void. +BM. Plan of care discussed. Hourly rounding in place. Call light within reach. All needs met. /90   Pulse 82   Temp 36.4 °C (97.6 °F) (Temporal)   Resp 17   Wt 100 kg (221 lb 5.5 oz)   SpO2 95%

## 2021-10-04 NOTE — PROGRESS NOTES
Report received from day shift RN, assumed Care.   Patient is AOx4, responds appropriately.      Pain controlled at this time.  Patient is tolerating diabetic diet, denies nausea/vomiting. + flatus  Up self with steady gait. Dressing in place to LLE, RUMA.     Plan of care discussed, all questions answered.    Educated on use of call light and importance of calling when assistance is required. Pt verbalizes understanding.    Call light and belongings within reach, treaded slipper socks on, bed in lowest locked position.  All needs met at this time.    COVID 19 surge in effect

## 2021-10-04 NOTE — PROGRESS NOTES
Pt discharged home via wheelchair. Wound apt setup for oct 5. Wound supplies sent with patient. Pt understands discharge instructions. Prescription sent to preferred pharmacy. IVx2 removed.

## 2021-10-05 ENCOUNTER — OFFICE VISIT (OUTPATIENT)
Dept: WOUND CARE | Facility: MEDICAL CENTER | Age: 51
End: 2021-10-05
Attending: FAMILY MEDICINE
Payer: MEDICARE

## 2021-10-05 ENCOUNTER — PATIENT OUTREACH (OUTPATIENT)
Dept: MEDICAL GROUP | Facility: PHYSICIAN GROUP | Age: 51
End: 2021-10-05

## 2021-10-05 VITALS
OXYGEN SATURATION: 98 % | DIASTOLIC BLOOD PRESSURE: 75 MMHG | SYSTOLIC BLOOD PRESSURE: 123 MMHG | HEART RATE: 83 BPM | TEMPERATURE: 97.6 F | RESPIRATION RATE: 18 BRPM

## 2021-10-05 DIAGNOSIS — E11.65 UNCONTROLLED TYPE 2 DIABETES MELLITUS WITH HYPERGLYCEMIA (HCC): ICD-10-CM

## 2021-10-05 DIAGNOSIS — T81.31XD DEHISCENCE OF OPERATIVE WOUND, SUBSEQUENT ENCOUNTER: ICD-10-CM

## 2021-10-05 DIAGNOSIS — E11.42 DIABETIC POLYNEUROPATHY ASSOCIATED WITH TYPE 2 DIABETES MELLITUS (HCC): ICD-10-CM

## 2021-10-05 DIAGNOSIS — Z89.431 HISTORY OF TRANSMETATARSAL AMPUTATION OF RIGHT FOOT (HCC): ICD-10-CM

## 2021-10-05 DIAGNOSIS — S81.801D OPEN WOUND OF RIGHT LOWER LEG, SUBSEQUENT ENCOUNTER: ICD-10-CM

## 2021-10-05 PROCEDURE — 11042 DBRDMT SUBQ TIS 1ST 20SQCM/<: CPT | Performed by: NURSE PRACTITIONER

## 2021-10-05 PROCEDURE — 11042 DBRDMT SUBQ TIS 1ST 20SQCM/<: CPT

## 2021-10-05 PROCEDURE — 99214 OFFICE O/P EST MOD 30 MIN: CPT | Mod: 25 | Performed by: NURSE PRACTITIONER

## 2021-10-05 PROCEDURE — 99213 OFFICE O/P EST LOW 20 MIN: CPT | Mod: 25

## 2021-10-05 ASSESSMENT — ENCOUNTER SYMPTOMS
CONSTIPATION: 0
COUGH: 0
FEVER: 0
DIARRHEA: 0
CHILLS: 0
SHORTNESS OF BREATH: 0
DEPRESSION: 0
VOMITING: 0
PALPITATIONS: 0
NAUSEA: 0
NERVOUS/ANXIOUS: 0

## 2021-10-05 NOTE — PROGRESS NOTES
Wound supply order faxed to [facility] at fax #[phone].    Wound 09/30/21 Pretibial Mid Right --Right Anterior Lower Leg (Active)   Wound Image    10/05/21 1415   Site Assessment Red;Yellow 10/05/21 1415   Periwound Assessment Scar tissue 10/05/21 1415   Margins Attached edges 10/05/21 1415   Drainage Amount Moderate 10/05/21 1415   Drainage Description Serosanguineous 10/05/21 1415   Treatments Cleansed;Provider debridement 10/05/21 1415   Wound Cleansing Normal Saline Irrigation 10/05/21 1415   Periwound Protectant Skin Protectant Wipes to Periwound 10/05/21 1415   Dressing Cleansing/Solutions Normal Saline 10/05/21 1415   Dressing Options Hydrofera Blue Classic;Silicone Adhesive Foam 10/05/21 1415   Dressing Changed New 10/05/21 1415   Non-staged Wound Description Full thickness 10/05/21 1415   Wound Length (cm) 1.4 cm 10/05/21 1415   Wound Width (cm) 0.5 cm 10/05/21 1415   Wound Depth (cm) 0.2 cm 10/05/21 1415   Wound Surface Area (cm^2) 0.7 cm^2 10/05/21 1415   Wound Volume (cm^3) 0.14 cm^3 10/05/21 1415   Post-Procedure Length (cm) 1.5 cm 10/05/21 1415   Post-Procedure Width (cm) 0.6 cm 10/05/21 1415   Post-Procedure Depth (cm) 0.2 cm 10/05/21 1415   Post-Procedure Surface Area (cm^2) 0.9 cm^2 10/05/21 1415   Post-Procedure Volume (cm^3) 0.18 cm^3 10/05/21 1415   Tunneling (cm) 0 cm 10/05/21 1415   Undermining (cm) 0 cm 10/05/21 1415   Wound Odor None 10/05/21 1415   Right Foot Monofilament 10-point exam (Sensate) 0/10 10/05/21 1415   Exposed Structures None 10/05/21 1415       Wound 10/05/21 Incision Foot Left --Left Third Toe Amputation Site (Active)   Wound Image   10/05/21 1415   Site Assessment Red 10/05/21 1415   Periwound Assessment Maceration 10/05/21 1415   Margins Unattached edges 10/05/21 1415   Drainage Amount Moderate 10/05/21 1415   Drainage Description Serosanguineous 10/05/21 1415   Treatments Cleansed 10/05/21 1415   Wound Cleansing Normal Saline Irrigation 10/05/21 1415   Periwound Protectant  Skin Protectant Wipes to Periwound 10/05/21 1415   Dressing Cleansing/Solutions Not Applicable 10/05/21 1415   Dressing Options Adaptic;Hydrofiber Silver;Nonadhesive Foam;Hypafix Tape;Tubigrip 10/05/21 1415   Dressing Changed New 10/05/21 1415   Non-staged Wound Description Full thickness 10/05/21 1415   Wound Length (cm) 0.5 cm 10/05/21 1415   Wound Width (cm) 0.3 cm 10/05/21 1415   Wound Depth (cm) 0.3 cm 10/05/21 1415   Wound Surface Area (cm^2) 0.15 cm^2 10/05/21 1415   Wound Volume (cm^3) 0.045 cm^3 10/05/21 1415   Tunneling (cm) 0 cm 10/05/21 1415   Undermining (cm) 0 cm 10/05/21 1415   Wound Odor None 10/05/21 1415   Pulses Doppler 10/05/21 1415   Left Foot Monofilament 10-point exam (Sensate) 0/10 10/05/21 1415   Exposed Structures Sutures 10/05/21 1415

## 2021-10-05 NOTE — PROGRESS NOTES
Provider Encounter- Diabetic Foot Ulcer      HISTORY OF PRESENT ILLNESS  Wound History:    START OF CARE IN CLINIC: 10/5/2021     REFERRING PROVIDER: .Anatoly Dodge M.D.   WOUND/LOCATION-left third toe amputation site                                                  Right anterior shin full-thickness wound     HISTORY: Patient presented to [facility] ED on 9/30/2021 with an infected left third toe.  States it started as a cut which progressively worsened.  He was treating the wound himself with supplies he had leftover from previous wound clinic visits.  Prior to going into the ED, he noticed purulent drainage and foul odor.  While changing his dressing he noticed a piece of bone fall out of the wound.  He was admitted, and started on IV antibiotics.  An x-ray was done which showed osteomyelitis of the third toe.  Patient underwent amputation of his toe on the same day.  He was seen by the LPS team who cleared him for discharge and arrange for outpatient wound clinic follow-up.  He was discharged with an offloading shoe.  Patient also has a full-thickness wound to his right shin, which he states he got from bumping on a ladder.  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     Patient is well-known to this clinic from previous treatment of wounds.  Was recently seen in the clinic in July of this year, treated for burn to right heel.  He was also treated in 2018 for dehiscence of TMA site to right foot.    Pertinent Medical History: Type 2 diabetes, TMA right foot, non-STEMI, hypertension, coronary artery disease, hearing loss    DIABETES HX: Diagnosed with type 2 diabetes 23 years ago, and is currently managing with oral and injectable agents.  Checks blood sugars 3-4 times per day and reports that these typically run around 170.  Has  had previous diabetes education.  Does  have numbness in feet and lower legs.  Usually wears prescriptive shoes and inserts. Does  check his feet  routinely.  Has  had previous foot ulcers and surgery, as noted in HPI.  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  Beni states he is feeling well overall, denies fevers, chills, nausea, vomiting, cough or shortness of breath.  He was just discharged from hospital yesterday, changed the dressing to his amputation site last night.  Reports his blood sugar this morning was around 150.  He does not have a follow-up appointment with his surgeon.    REVIEW OF SYSTEMS:   Review of Systems   Constitutional: Negative for chills and fever.   HENT: Positive for hearing loss.    Respiratory: Negative for cough and shortness of breath.    Cardiovascular: Negative for chest pain, palpitations and leg swelling.   Gastrointestinal: Negative for constipation, diarrhea, nausea and vomiting.   Genitourinary: Negative for dysuria.   Skin: Negative for itching and rash.        Wound to right heel   Neurological:        Numbness in both feet and lower legs, radiating to about mid calf   Psychiatric/Behavioral: Negative for depression. The patient is not nervous/anxious.        PHYSICAL EXAMINATION:   /75 (BP Location: Left arm, Patient Position: Sitting)   Pulse 83   Temp 36.4 °C (97.6 °F) (Temporal)   Resp 18   SpO2 98%     Physical Exam  Constitutional:       Appearance: Normal appearance. He is normal weight.   HENT:      Head: Normocephalic.      Ears:      Comments: Hearing loss  Eyes:      Pupils: Pupils are equal, round, and reactive to light.   Cardiovascular:      Pulses: Normal pulses.      Comments: Pedal pulses difficult to palpate, multiphasic by Doppler  Pulmonary:      Effort: Pulmonary effort is normal.    Musculoskeletal:         General: Swelling present. No signs of injury.      Comments: Localized swelling to site of left third toe amputation  Healed right TMA   Skin:     General: Skin is warm.      Comments: Sutures intact to left third toe application site, slight dehiscence, moderate serosanguineous drainage, periwound macerated  Full-thickness wound to right anterior shin  Refer to wound flowsheet and photos   Neurological:      Mental Status: He is alert and oriented to person, place, and time.      Comments: Both feet insensate   Psychiatric:         Mood and Affect: Mood normal.         Behavior: Behavior normal.         Thought Content: Thought content normal.         Judgment: Judgment normal.     Monofilament testing with a 10 gram force: sensation intact: decreased bilaterally  Visual Inspection: Feet with maceration, ulcers, fissures.  Pedal pulses: decreased bilaterally    WOUND ASSESSMENT  Wound 09/30/21 Pretibial Mid Right --Right Anterior Lower Leg (Active)   Wound Image    10/05/21 1415   Site Assessment Red;Yellow 10/05/21 1415   Periwound Assessment Scar tissue 10/05/21 1415   Margins Attached edges 10/05/21 1415   Drainage Amount Moderate 10/05/21 1415   Drainage Description Serosanguineous 10/05/21 1415   Treatments Cleansed;Provider debridement 10/05/21 1415   Wound Cleansing Normal Saline Irrigation 10/05/21 1415   Periwound Protectant Skin Protectant Wipes to Periwound 10/05/21 1415   Dressing Cleansing/Solutions Normal Saline 10/05/21 1415   Dressing Options Hydrofera Blue Classic;Silicone Adhesive Foam 10/05/21 1415   Dressing Changed New 10/05/21 1415   Non-staged Wound Description Full thickness 10/05/21 1415   Wound Length (cm) 1.4 cm 10/05/21 1415   Wound Width (cm) 0.5 cm 10/05/21 1415   Wound Depth (cm) 0.2 cm 10/05/21 1415   Wound Surface Area (cm^2) 0.7 cm^2 10/05/21 1415   Wound Volume (cm^3) 0.14 cm^3 10/05/21 1415   Post-Procedure Length (cm) 1.5 cm 10/05/21 1415    Post-Procedure Width (cm) 0.6 cm 10/05/21 1415   Post-Procedure Depth (cm) 0.2 cm 10/05/21 1415   Post-Procedure Surface Area (cm^2) 0.9 cm^2 10/05/21 1415   Post-Procedure Volume (cm^3) 0.18 cm^3 10/05/21 1415   Tunneling (cm) 0 cm 10/05/21 1415   Undermining (cm) 0 cm 10/05/21 1415   Wound Odor None 10/05/21 1415   Right Foot Monofilament 10-point exam (Sensate) 0/10 10/05/21 1415   Exposed Structures None 10/05/21 1415   Number of days: 5       Wound 10/05/21 Incision Foot Left --Left Third Toe Amputation Site (Active)   Wound Image   10/05/21 1415   Site Assessment Red 10/05/21 1415   Periwound Assessment Maceration 10/05/21 1415   Margins Unattached edges 10/05/21 1415   Drainage Amount Moderate 10/05/21 1415   Drainage Description Serosanguineous 10/05/21 1415   Treatments Cleansed 10/05/21 1415   Wound Cleansing Normal Saline Irrigation 10/05/21 1415   Periwound Protectant Skin Protectant Wipes to Periwound 10/05/21 1415   Dressing Cleansing/Solutions Not Applicable 10/05/21 1415   Dressing Options Adaptic;Hydrofiber Silver;Nonadhesive Foam;Hypafix Tape;Tubigrip 10/05/21 1415   Dressing Changed New 10/05/21 1415   Non-staged Wound Description Full thickness 10/05/21 1415   Wound Length (cm) 0.5 cm 10/05/21 1415   Wound Width (cm) 0.3 cm 10/05/21 1415   Wound Depth (cm) 0.3 cm 10/05/21 1415   Wound Surface Area (cm^2) 0.15 cm^2 10/05/21 1415   Wound Volume (cm^3) 0.045 cm^3 10/05/21 1415   Tunneling (cm) 0 cm 10/05/21 1415   Undermining (cm) 0 cm 10/05/21 1415   Wound Odor None 10/05/21 1415   Pulses Doppler 10/05/21 1415   Left Foot Monofilament 10-point exam (Sensate) 0/10 10/05/21 1415   Exposed Structures Sutures 10/05/21 1415   Number of days: 0       PROCEDURE: Excisional debridement of right shin wound  -2% viscous lidocaine applied topically to wound bed for approximately 5 minutes prior to debridement  -Curette used to debride wound bed.  Excisional debridement was performed to remove devitalized  tissue until healthy, bleeding tissue was visualized.   Entire surface of wound, 0.9 cm2 debrided.  Tissue debrided into the subcutaneous layer.    -Bleeding controlled with manual pressure.    -Wound care completed by wound RN, refer to flowsheet  -Patient tolerated the procedure well, without c/o pain or discomfort.       Pertinent Labs and Diagnostics:    Labs:     A1c:   Lab Results   Component Value Date/Time    HBA1C 7.5 (H) 2021 07:16 PM          IMAGIN2021-three-view x-ray left foot  IMPRESSION:        Osseous destruction involving the left third mid and distal phalanges, in keeping with osteomyelitis.      VASCULAR STUDIES: No recent studies found in epic  LAST  WOUND CULTURE: 2021-OR cultures  Lab Results   Component Value Date/Time    CULTRSULT (A) 2021 02:07 PM     Growth noted after further incubation, see below for  organism identification.      CULTRSULT Staphylococcus simulans  Rare growth   (A) 2021 02:07 PM    CULTRSULT Streptococcus constellatus  Rare growth   (A) 2021 02:07 PM    CULTRSULT - (A) 2021 02:07 PM    CULTRSULT Parvimonas micra  Rare growth   (A) 2021 02:07 PM             ASSESSMENT AND PLAN:     1. Dehiscence of operative wound, subsequent encounter  Comments: Amputation of left third toe due to toe amp on 2021.  Partial dehiscence near center of incision    10/5/2021: Initial clinic visit, though patient is known to clinic from treatment of previous wounds.  Slight dehiscence at approximately mid incision of toe amp site.  Moderate to heavy serosanguineous drainage.  Periincisional tissue slightly macerated.  At risk for complete dehiscence  -No need for debridement today  -Wound care by nursing  -Patient to continue wearing offloading shoe at all times when ambulating    Wound care: Adaptic along incision to prevent sticking of dressing, Aquacel AG to manage exudate and bioburden, foam cover dressing, Hypafix tape to secure,  Tubigrip to manage edema    2. Open wound of right lower leg, subsequent encounter  Comments: Traumatic wound from bumping on ladder.  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  No signs or symptoms of infection  -Excisional debridement of wound in clinic today, medically necessary to promote wound healing.  -Patient to return to clinic weekly for assessment and debridement  -Patient to change dressing 1-2 times per week in between clinic visits    Wound care: Hydrofera Blue to manage exudate and bioburden, foam cover dressing, Hypafix tape    3. Uncontrolled type 2 diabetes mellitus with hyperglycemia (HCC)  Comments: A1c checked during hospitalization, 7.5    10/5/2021: Patient states his blood sugar today was around 150, though usually runs around 170.  -Patient encouraged to keep his blood sugars below 120 in order to optimize wound healing  -Consider referral to endocrinology    4. Diabetic polyneuropathy associated with type 2 diabetes mellitus (HCC)  Comments: Monofilament testing in clinic on initial evaluation, both feet insensate.    10/5/2020 want:  - Implications of loss of protective sensation (LOPS) reviewed with patient- including increased risk for amputation.  Advised to check feet at least daily, moisturize feet, and to always wear protective foot wear.     5. History of transmetatarsal amputation of right foot (HCC)  Comments: Patient underwent right TMA in 2018, prior to that he had undergone amputations of toes.  He is at high risk for further amputations.      PATIENT EDUCATION  - Importance of tight glucose control for wound healing   - Implications of loss of protective sensation (LOPS) discussed with patient- including increased risk for amputation.  - Advised to check feet at least daily, moisturize feet, and to always wear protective foot wear.   -  Importance of offloading foot to assist with wound healing  -  Advised pt not to trim nails or calluses, seek foot/nail care from podiatrist or certified foot/nail nurse  - Importance of adequate nutrition for wound healing    My total time spent caring for the patient on the day of the encounter was 30 minutes.   This does not include time spent on separately billable procedures/tests.    Please note that this note may have been created using voice recognition software. I have worked with technical experts from [facility] to optimize the interface.  I have made every reasonable attempt to correct obvious errors, but there may be errors of grammar and possibly content that I did not discover before finalizing the note.

## 2021-10-05 NOTE — PROGRESS NOTES
RN Transitional Care Management discharge follow up call completed. Patient denies questions regarding medications or follow up care. Patient has discharge follow up appointment scheduled with wound care today, patient states he will call to schedule follow up appointment with PCP around his other appointments. Please route chart back to RN if additional follow up is needed/requested.     Thank you!    JULIANO Thomas Care Coordinator  [facility] [phone]  Chronic Care Management [phone]

## 2021-10-05 NOTE — PATIENT INSTRUCTIONS
-Keep dressings clean and dry. Change dressings once between wound clinic visits, and if the dressings become saturated, soiled, or fall off.    -Avoid prolonged standing or sitting without elevating your legs.    -Remove your compression garments if you have severe pain, severe swelling, numbness, color change, or temperature change in your toes. If you need to remove your compression garments, do so by unrolling them. Do not cut the compression garments off, this is to prevent cutting yourself on accident.    -Never walk around the house barefoot. Wear your offloading boot any time you are up walking.    -Should you experience any significant changes in your wounds, such as signs of infection (increasing redness, swelling, localized heat, increased pain, fever > 101 F, chills) or have any questions regarding your home care instructions, please contact the wound center at [phone]. If after hours, contact your primary care physician or go to the hospital emergency room.     -If you are 5 or more minutes late for an appointment, we reserve the right to cancel and reschedule that appointment. Additionally, if you are habitually late or not showing (3 late cancellations and/or no shows), we reserve the right to cancel your remaining appointments and it will be your responsibility to obtain a new referral if services are still needed.

## 2021-10-06 ENCOUNTER — PATIENT OUTREACH (OUTPATIENT)
Dept: HEALTH INFORMATION MANAGEMENT | Facility: OTHER | Age: 51
End: 2021-10-06

## 2021-10-06 LAB
BACTERIA SPEC ANAEROBE CULT: ABNORMAL
SIGNIFICANT IND 70042: ABNORMAL
SITE SITE: ABNORMAL
SOURCE SOURCE: ABNORMAL

## 2021-10-06 RX ORDER — GABAPENTIN 100 MG/1
100 CAPSULE ORAL
Qty: 90 CAPSULE | Refills: 0 | Status: SHIPPED | OUTPATIENT
Start: 2021-10-06 | End: 2022-11-11

## 2021-10-06 NOTE — PROGRESS NOTES
Community Health Worker Intake    • Social determinates of health intake completed  • Identified barriers to: none.  • Contact information provided to Beni Moore   • Has PCP appointment scheduled for: Monday, October 18 at 9:30 am  • Inpatient assessment completed.  • Did the patient receive medications post discharge: Yes    CHW Rachid reached out to pt via TC to f/u after d/c and give appt reminders. Pt stated he was doing well and had just scheduled his hospital f/u. He did not express any food, housing or transportation barriers. Pt is till an active  and has a great support system outside of the hospital including his wife and parents. He declined needing another f/u call and does not nee additional resources/services at this time.      Plan: D/c due to no needs/declined services.

## 2021-10-12 ENCOUNTER — OFFICE VISIT (OUTPATIENT)
Dept: WOUND CARE | Facility: MEDICAL CENTER | Age: 51
End: 2021-10-12
Attending: FAMILY MEDICINE
Payer: MEDICARE

## 2021-10-12 VITALS
TEMPERATURE: 97.8 F | HEART RATE: 85 BPM | DIASTOLIC BLOOD PRESSURE: 90 MMHG | SYSTOLIC BLOOD PRESSURE: 127 MMHG | RESPIRATION RATE: 18 BRPM | OXYGEN SATURATION: 99 %

## 2021-10-12 DIAGNOSIS — S81.801D OPEN WOUND OF RIGHT LOWER LEG, SUBSEQUENT ENCOUNTER: ICD-10-CM

## 2021-10-12 DIAGNOSIS — E11.65 UNCONTROLLED TYPE 2 DIABETES MELLITUS WITH HYPERGLYCEMIA (HCC): ICD-10-CM

## 2021-10-12 DIAGNOSIS — Z89.431 HISTORY OF TRANSMETATARSAL AMPUTATION OF RIGHT FOOT (HCC): ICD-10-CM

## 2021-10-12 DIAGNOSIS — T81.31XD DEHISCENCE OF OPERATIVE WOUND, SUBSEQUENT ENCOUNTER: Primary | ICD-10-CM

## 2021-10-12 DIAGNOSIS — E11.42 DIABETIC POLYNEUROPATHY ASSOCIATED WITH TYPE 2 DIABETES MELLITUS (HCC): ICD-10-CM

## 2021-10-12 PROCEDURE — 11042 DBRDMT SUBQ TIS 1ST 20SQCM/<: CPT

## 2021-10-12 PROCEDURE — 99213 OFFICE O/P EST LOW 20 MIN: CPT | Mod: 25 | Performed by: NURSE PRACTITIONER

## 2021-10-12 PROCEDURE — 11042 DBRDMT SUBQ TIS 1ST 20SQCM/<: CPT | Performed by: NURSE PRACTITIONER

## 2021-10-12 PROCEDURE — 99213 OFFICE O/P EST LOW 20 MIN: CPT | Mod: 25

## 2021-10-12 ASSESSMENT — ENCOUNTER SYMPTOMS
DIARRHEA: 0
CONSTIPATION: 0
PALPITATIONS: 0
VOMITING: 0
FEVER: 0
CHILLS: 0
NAUSEA: 0
NERVOUS/ANXIOUS: 0
DEPRESSION: 0
SHORTNESS OF BREATH: 0
COUGH: 0

## 2021-10-12 NOTE — PATIENT INSTRUCTIONS
-Keep your wound dressing clean, dry, and intact.    -Change your dressing if it becomes soiled, soaked, or falls off.    - Resolved wound be fragile for a few days, bathe and dry area gently, only ever regains a maximum of 80% of the tensile strength of the surrounding skin, remodeling of scar can continue for 6mo - a year. Contact PCP for a referral back her if any problems with area opening and draining again.    -Should you experience any significant changes in your wound(s), such as infection (redness, swelling, localized heat, increased pain, fever > 101 F, chills) or have any questions regarding your home care instructions, please contact the wound center at [phone]. If after hours, contact your primary care physician or go to the hospital emergency room.

## 2021-10-12 NOTE — PROGRESS NOTES
Provider Encounter- Diabetic Foot Ulcer      HISTORY OF PRESENT ILLNESS  Wound History:    START OF CARE IN CLINIC: 10/5/2021     REFERRING PROVIDER: .Anatoly Dodge M.D.   WOUND/LOCATION-left third toe amputation site                                                  Right anterior shin full-thickness wound     HISTORY: Patient presented to [facility] ED on 9/30/2021 with an infected left third toe.  States it started as a cut which progressively worsened.  He was treating the wound himself with supplies he had leftover from previous wound clinic visits.  Prior to going into the ED, he noticed purulent drainage and foul odor.  While changing his dressing he noticed a piece of bone fall out of the wound.  He was admitted, and started on IV antibiotics.  An x-ray was done which showed osteomyelitis of the third toe.  Patient underwent amputation of his toe on the same day.  He was seen by the LPS team who cleared him for discharge and arrange for outpatient wound clinic follow-up.  He was discharged with an offloading shoe.  Patient also has a full-thickness wound to his right shin, which he states he got from bumping on a ladder.  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     Patient is well-known to this clinic from previous treatment of wounds.  Was recently seen in the clinic in July of this year, treated for burn to right heel.  He was also treated in 2018 for dehiscence of TMA site to right foot.    Pertinent Medical History: Type 2 diabetes, TMA right foot, non-STEMI, hypertension, coronary artery disease, hearing loss    DIABETES HX: Diagnosed with type 2 diabetes 23 years ago, and is currently managing with oral and injectable agents.  Checks blood sugars 3-4 times per day and reports that these typically run around 170.  Has  had previous diabetes education.  Does  have numbness in feet and lower legs.  Usually wears prescriptive shoes and inserts. Does  check his feet  routinely.  Has  had previous foot ulcers and surgery, as noted in HPI.  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  Beni states he is feeling well overall, denies fevers, chills, nausea, vomiting, cough or shortness of breath.  He was just discharged from hospital yesterday, changed the dressing to his amputation site last night.  Reports his blood sugar this morning was around 150.  He does not have a follow-up appointment with his surgeon.    10/12/2021: Clinic visit with ELVIE Yarbrough.  Patient reports that he does not have a follow-up appointment with his surgeon.  Patient instructed that he should contact his surgeon for a follow-up appointment.  Patient still has sutures from surgery intact.  Patient would benefit from the sutures remaining in place for another 1 to 2 weeks.  Will reevaluate next clinical appointment.  Patient with small open wound to right anterior lower extremity near resolution.  Debridement of approximated amputation site to remove slough and nonviable tissue.    REVIEW OF SYSTEMS:   Review of Systems   Constitutional: Negative for chills and fever.   HENT: Positive for hearing loss.    Respiratory: Negative for cough and shortness of breath.    Cardiovascular: Negative for chest pain, palpitations and leg swelling.   Gastrointestinal: Negative for constipation, diarrhea, nausea and vomiting.   Genitourinary: Negative for dysuria.   Skin: Negative for itching and rash.        Amputation site left third toe  Superficial wound right anterior lower extremity   Neurological:        Numbness in both feet and lower legs, radiating to about mid calf   Psychiatric/Behavioral: Negative  for depression. The patient is not nervous/anxious.        PHYSICAL EXAMINATION:   /90 (BP Location: Left arm, Patient Position: Sitting)   Pulse 85   Temp 36.6 °C (97.8 °F) (Temporal)   Resp 18   SpO2 99%     Physical Exam  Constitutional:       Appearance: Normal appearance. He is normal weight.   HENT:      Head: Normocephalic.      Ears:      Comments: Hearing loss  Eyes:      Pupils: Pupils are equal, round, and reactive to light.   Cardiovascular:      Pulses: Normal pulses.      Comments: Pedal pulses difficult to palpate, multiphasic by Doppler  Pulmonary:      Effort: Pulmonary effort is normal.   Skin:     General: Skin is warm.      Comments: Sutures intact to left third toe application site, slight dehiscence, moderate serosanguineous drainage, periwound macerated  Full-thickness wound to right anterior shin near resolution  Refer to wound flowsheet and photos   Neurological:      Mental Status: He is alert and oriented to person, place, and time.      Comments: Both feet insensate   Psychiatric:         Mood and Affect: Mood normal.         Behavior: Behavior normal.         Thought Content: Thought content normal.         Judgment: Judgment normal.     Monofilament testing with a 10 gram force: sensation intact: decreased bilaterally  Visual Inspection: Feet with maceration, ulcers, fissures.  Pedal pulses: decreased bilaterally    WOUND ASSESSMENT  Wound 09/30/21 Pretibial Mid Right --Right Anterior Lower Leg (Active)   Wound Image   10/12/21 1400   Site Assessment Epithelialization 10/12/21 1400   Periwound Assessment Scar tissue 10/12/21 1400   Margins Attached edges 10/12/21 1400   Drainage Amount None 10/12/21 1400   Drainage Description Other (Comment) 10/12/21 1400   Treatments Cleansed;Provider debridement 10/12/21 1400   Wound Cleansing Normal Saline Irrigation 10/12/21 1400   Periwound Protectant Skin Protectant Wipes to Periwound 10/12/21 1400   Dressing Cleansing/Solutions Not  Applicable 10/12/21 1400   Dressing Options Hydrofiber Silver;Silicone Adhesive Foam 10/12/21 1400   Dressing Changed Changed 10/12/21 1400   Dressing Change/Treatment Frequency As Needed 10/12/21 1400   Non-staged Wound Description Partial thickness 10/12/21 1400   Wound Length (cm) 1.4 cm 10/05/21 1415   Wound Width (cm) 0.5 cm 10/05/21 1415   Wound Depth (cm) 0.2 cm 10/05/21 1415   Wound Surface Area (cm^2) 0.7 cm^2 10/05/21 1415   Wound Volume (cm^3) 0.14 cm^3 10/05/21 1415   Post-Procedure Length (cm) 0.5 cm 10/12/21 1400   Post-Procedure Width (cm) 0.4 cm 10/12/21 1400   Post-Procedure Depth (cm) 0.1 cm 10/12/21 1400   Post-Procedure Surface Area (cm^2) 0.2 cm^2 10/12/21 1400   Post-Procedure Volume (cm^3) 0.02 cm^3 10/12/21 1400   Tunneling (cm) 0 cm 10/12/21 1400   Undermining (cm) 0 cm 10/12/21 1400   Wound Odor None 10/12/21 1400   Right Foot Monofilament 10-point exam (Sensate) 0/10 10/05/21 1415   Exposed Structures None 10/12/21 1400   Number of days: 12       Wound 10/05/21 Incision Foot Left --Left Third Toe Amputation Site (Active)   Wound Image   10/12/21 1400   Site Assessment Red;[place] 10/12/21 1400   Periwound Assessment White;Maceration 10/12/21 1400   Margins Unattached edges 10/12/21 1400   Drainage Amount Moderate 10/12/21 1400   Drainage Description Serosanguineous 10/12/21 1400   Treatments Cleansed;Provider debridement 10/12/21 1400   Wound Cleansing [place] 10/12/21 1400   Periwound Protectant Barrier Paste 10/12/21 1400   Dressing Cleansing/Solutions Not Applicable 10/12/21 1400   Dressing Options Hydrofiber Silver;Nonadhesive Foam;Hypafix Tape;Tubigrip 10/12/21 1400   Dressing Changed Changed 10/12/21 1400   Dressing Change/Treatment Frequency Every 48 hrs, and As Needed 10/12/21 1400   Non-staged Wound Description Full thickness 10/12/21 1400   Wound Length (cm) 0.5 cm 10/12/21 1400   Wound Width (cm) 0.3 cm 10/12/21 1400   Wound Depth (cm) 0.1 cm 10/12/21 1400   Wound Surface  Area (cm^2) 0.15 cm^2 10/12/21 1400   Wound Volume (cm^3) 0.015 cm^3 10/12/21 1400   Post-Procedure Length (cm) 0.5 cm 10/12/21 1400   Post-Procedure Width (cm) 0.3 cm 10/12/21 1400   Post-Procedure Depth (cm) 0.1 cm 10/12/21 1400   Post-Procedure Surface Area (cm^2) 0.15 cm^2 10/12/21 1400   Post-Procedure Volume (cm^3) 0.015 cm^3 10/12/21 1400   Wound Healing % 67 10/12/21 1400   Tunneling (cm) 0 cm 10/12/21 1400   Undermining (cm) 0 cm 10/12/21 1400   Wound Odor None 10/12/21 1400   Pulses Doppler 10/05/21 1415   Left Foot Monofilament 10-point exam (Sensate) 0/10 10/05/21 1415   Exposed Structures Sutures 10/12/21 1400   Number of days: 7       PROCEDURE: Excisional debridement of right shin wound  -2% viscous lidocaine applied topically to wound bed for approximately 5 minutes prior to debridement  -Curette used to debride wound bed.  Excisional debridement was performed to remove devitalized tissue until healthy, bleeding tissue was visualized.   Entire surface of wound, 0.35 cm2 debrided.  Tissue debrided into the subcutaneous layer.    -Bleeding controlled with manual pressure.    -Wound care completed by wound RN, refer to flowsheet  -Patient tolerated the procedure well, without c/o pain or discomfort.       Pertinent Labs and Diagnostics:    Labs:     A1c:   Lab Results   Component Value Date/Time    HBA1C 7.5 (H) 2021 07:16 PM          IMAGIN2021-three-view x-ray left foot  IMPRESSION:        Osseous destruction involving the left third mid and distal phalanges, in keeping with osteomyelitis.      VASCULAR STUDIES: No recent studies found in epic  LAST  WOUND CULTURE: 2021-OR cultures  Lab Results   Component Value Date/Time    CULTRSULT (A) 2021 02:07 PM     Growth noted after further incubation, see below for  organism identification.      CULTRSULT Staphylococcus simulans  Rare growth   (A) 2021 02:07 PM    CULTRSULT Streptococcus constellatus  Rare growth   (A)  09/30/2021 02:07 PM    CULTRSULT No Anaerobes isolated. (A) 09/30/2021 02:07 PM    CULTRSULT Parvimonas micra  Rare growth   (A) 09/30/2021 02:07 PM    CULTRSULT - (A) 09/30/2021 02:07 PM             ASSESSMENT AND PLAN:     1. Dehiscence of operative wound, subsequent encounter  Comments: Amputation of left third toe due to toe amp on 9/30/2021.  Partial dehiscence near center of incision    10/12/2021:  Slight dehiscence at approximately mid incision of toe amp site.  Moderate to heavy serosanguineous drainage.  Marta incisional tissue slightly macerated.      -Excisional debridement required in clinic today.  -Patient instructed that he should change the dressing as needed for saturation.  Patient educated that this may be more frequent than previously instructed.  Patient to reevaluate at approximately 9 PM tonight and adjust as needed due to periwound maceration.  -Wound care by nursing  -Patient to continue wearing offloading shoe at all times when ambulating    Wound care: Hydrofiber silver, nonadhesive foam, Hypafix tape, Tubigrip E, offloading shoe    2. Open wound of right lower leg, subsequent encounter  Comments: Traumatic wound from bumping on ladder.  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  -Patient to return to clinic weekly for assessment and debridement  -Patient to change dressing 1-2 times per week in between clinic visits    Wound care: Hydrofiber silver, silicone adhesive foam    3. Uncontrolled type 2 diabetes mellitus with hyperglycemia (HCC)  Comments: A1c checked during hospitalization, 7.5    10/12/2021: Patient states his blood sugar today was around 160.  -Patient encouraged to keep his blood sugars below 140 in order to optimize wound healing  -Consider referral to endocrinology    4. Diabetic polyneuropathy associated with type 2  diabetes mellitus (HCC)  Comments: Monofilament testing in clinic on initial evaluation, both feet insensate.    10/12/2020:  - Implications of loss of protective sensation (LOPS) reviewed with patient- including increased risk for amputation.  Advised to check feet at least daily, moisturize feet, and to always wear protective foot wear.     5. History of transmetatarsal amputation of right foot (HCC)  Comments: Patient underwent right TMA in 2018, prior to that he had undergone amputations of toes.  He is at high risk for further amputations.      PATIENT EDUCATION  - Importance of tight glucose control for wound healing   - Implications of loss of protective sensation (LOPS) discussed with patient- including increased risk for amputation.  - Advised to check feet at least daily, moisturize feet, and to always wear protective foot wear.   -  Importance of offloading foot to assist with wound healing  - Advised pt not to trim nails or calluses, seek foot/nail care from podiatrist or certified foot/nail nurse  - Importance of adequate nutrition for wound healing    My total time spent caring for the patient on the day of the encounter was 20 minutes.   This does not include time spent on separately billable procedures/tests.    Please note that this note may have been created using voice recognition software. I have worked with technical experts from [facility] to optimize the interface.  I have made every reasonable attempt to correct obvious errors, but there may be errors of grammar and possibly content that I did not discover before finalizing the note.

## 2021-10-14 RX ORDER — CARVEDILOL 6.25 MG/1
6.25 TABLET ORAL 2 TIMES DAILY
Qty: 180 TABLET | Refills: 2 | Status: SHIPPED | OUTPATIENT
Start: 2021-10-14 | End: 2022-04-06

## 2021-10-15 ENCOUNTER — DOCUMENTATION (OUTPATIENT)
Dept: MEDICAL GROUP | Facility: PHYSICIAN GROUP | Age: 51
End: 2021-10-15

## 2021-10-18 ENCOUNTER — NON-PROVIDER VISIT (OUTPATIENT)
Dept: WOUND CARE | Facility: MEDICAL CENTER | Age: 51
End: 2021-10-18
Attending: FAMILY MEDICINE
Payer: MEDICARE

## 2021-10-18 ENCOUNTER — NON-PROVIDER VISIT (OUTPATIENT)
Dept: MEDICAL GROUP | Facility: PHYSICIAN GROUP | Age: 51
End: 2021-10-18
Payer: MEDICARE

## 2021-10-18 ENCOUNTER — OFFICE VISIT (OUTPATIENT)
Dept: MEDICAL GROUP | Facility: PHYSICIAN GROUP | Age: 51
End: 2021-10-18
Payer: MEDICARE

## 2021-10-18 VITALS
SYSTOLIC BLOOD PRESSURE: 134 MMHG | HEIGHT: 72 IN | HEART RATE: 77 BPM | BODY MASS INDEX: 29.15 KG/M2 | OXYGEN SATURATION: 97 % | DIASTOLIC BLOOD PRESSURE: 86 MMHG | RESPIRATION RATE: 16 BRPM | TEMPERATURE: 97.2 F | WEIGHT: 215.2 LBS

## 2021-10-18 DIAGNOSIS — E11.65 TYPE 2 DIABETES MELLITUS WITH HYPERGLYCEMIA, WITH LONG-TERM CURRENT USE OF INSULIN (HCC): ICD-10-CM

## 2021-10-18 DIAGNOSIS — Z89.431 S/P TRANSMETATARSAL AMPUTATION OF FOOT, RIGHT (HCC): ICD-10-CM

## 2021-10-18 DIAGNOSIS — M86.9 OSTEOMYELITIS OF LEFT FOOT, UNSPECIFIED TYPE (HCC): ICD-10-CM

## 2021-10-18 DIAGNOSIS — Z79.4 TYPE 2 DIABETES MELLITUS WITH HYPERGLYCEMIA, WITH LONG-TERM CURRENT USE OF INSULIN (HCC): ICD-10-CM

## 2021-10-18 DIAGNOSIS — M86.172 OTHER ACUTE OSTEOMYELITIS OF LEFT FOOT (HCC): ICD-10-CM

## 2021-10-18 DIAGNOSIS — I10 ESSENTIAL HYPERTENSION: ICD-10-CM

## 2021-10-18 PROBLEM — E16.2 HYPOGLYCEMIA: Status: RESOLVED | Noted: 2021-08-19 | Resolved: 2021-10-18

## 2021-10-18 PROCEDURE — 97602 WOUND(S) CARE NON-SELECTIVE: CPT

## 2021-10-18 PROCEDURE — 97597 DBRDMT OPN WND 1ST 20 CM/<: CPT

## 2021-10-18 PROCEDURE — 99401 PREV MED CNSL INDIV APPRX 15: CPT | Performed by: INTERNAL MEDICINE

## 2021-10-18 PROCEDURE — 99213 OFFICE O/P EST LOW 20 MIN: CPT | Performed by: FAMILY MEDICINE

## 2021-10-18 RX ORDER — DULAGLUTIDE 1.5 MG/.5ML
INJECTION, SOLUTION SUBCUTANEOUS
COMMUNITY
Start: 2021-10-16 | End: 2021-10-18

## 2021-10-18 ASSESSMENT — FIBROSIS 4 INDEX: FIB4 SCORE: 0.87

## 2021-10-18 NOTE — NON-PROVIDER
Patient Consult Note    TIME IN: 8:30 am  TIME OUT: 8:56 am    Primary care physician: Florinda Pascual M.D.    Reason for consult: Management of Uncontrolled Type 2 Diabetes    HPI:  Beni Moore is a 51 y.o. old patient who comes in today for evaluation of above stated problem.    Most Recent HbA1c:   Lab Results   Component Value Date/Time    HBA1C 7.5 (H) 09/30/2021 07:16 PM      Lab Results   Component Value Date/Time    CREATININE 0.80 10/04/2021 03:29 AM        Diabetes Medication History and Current Regimen  Metformin: Pt w/ GI issues - he's on cholestyramine to help mitigate. He is disabled because of this   GLP-1 Agent: Dulaglutide 0.75 mg once weekly   SGLT-2 Inhibitor:  Empagliflozin 10 mg once daily     Basal Insulin: Lantus 10-15 units QHS    Pt has home glucometer and proper testing technique - Yes    Pt reports blood sugars:   Before Breakfast: Always < 120  Other times: > 200 when he eats more carbs    Hypoglycemia awareness - Yes  Nocturnal hypoglycemia- Denies  Hypoglycemia:  None    Pt's treatment of Hypoglycemia - 15:15 Rule    Current Exercise - Minimal regular exercise  Exercise Goal - Difficultly with aerobic exercise given amputation on right foot. Pt also recently had covid. He states he does a lot of yard work and plans to hunt this Fall. Plans to increase as tolerated.    Dietary - Patient has excellent understanding of proper diabetic diet and need for minimal simple carbs and sugars.  He practices good portion control and denies snacking on sweets, sodas, or juices.     Pt reports eating:  Great portion control  · Breakfast - eggs, sausage, yogurts  · Snack - none  · Lunch - meats, some bread, small portion  · Snack - no fruits   · Dinner - veggies, meats  · Snack - none    Foot Exam:  Monofilament exam - Pt w/ recent toe amputation    Preventative Management  BP regimen (ACE/ARB) - N/a  ASA - 81 mg once daily  Statin - Atorvastatin 80 mg once daily  Last Retinal Scan - 9/2021  Last  Foot Exam - Pt follows w/ podiatry  Last A1c -   Lab Results   Component Value Date/Time    HBA1C 7.5 (H) 09/30/2021 07:16 PM      Last Microalbuminuria -    Ref. Range 6/28/2021 08:27   Micro Alb Creat Ratio Latest Ref Range: 0 - 30 mg/g 1554 (H)   Creatinine, Urine Latest Units: mg/dL 187.56   Microalbumin, Urine Random Latest Units: mg/dL 291.5       Past Medical History:  Patient Active Problem List    Diagnosis Date Noted   • Hypokalemia 10/03/2021   • Hypomagnesemia 10/01/2021   • Osteomyelitis of left foot ([facility]) 09/30/2021   • Diabetic foot infection ([facility]) 09/30/2021   • Presence of stent in coronary artery 09/30/2021   • History of non-ST elevation myocardial infarction (NSTEMI) 09/30/2021   • History of COVID-19 09/30/2021   • Class 1 obesity due to excess calories with serious comorbidity and body mass index (BMI) of 30.0 to 30.9 in adult 08/20/2021   • Syncope and collapse 08/19/2021   • Hypoglycemia 08/19/2021   • COVID-19 08/19/2021   • Sepsis ([facility]) 08/19/2021   • Elevated troponin 08/19/2021   • Hyponatremia 08/19/2021   • Lower limb diabetic mononeuropathy ([facility]) 07/16/2021   • Type 2 diabetes mellitus with hyperglycemia, with long-term current use of insulin ([facility]) 07/16/2021   • Partial thickness burn of right foot 07/02/2021   • Retinopathy 01/14/2020   • Conductive hearing loss, bilateral 01/14/2020   • Coronary artery disease involving native coronary artery of native heart without angina pectoris 10/02/2019   • Functional diarrhea 09/27/2019   • Essential hypertension 09/21/2019   • NSTEMI (non-ST elevated myocardial infarction) ([facility]) 09/21/2019   • AXEL (acute kidney injury) ([facility]) 09/21/2019   • S/P transmetatarsal amputation of foot, right ([facility]) 07/05/2018   • Normocytic anemia 06/30/2018   • Overweight (BMI 25.0-29.9) 06/30/2018       Past Surgical History:  Past Surgical History:   Procedure Laterality Date   • TOE AMPUTATION Left 9/30/2021    Procedure: AMPUTATION, TOE;  Surgeon: Tomer BRYAN  JESENIA Hart;  Location: SURGERY [facility];  Service: Orthopedics   • STENT PLACEMENT  2019    STEMI with nonobstructive LAD   • TOE AMPUTATION Right 7/2/2018    Procedure: Transmetatarsal amputation;  Surgeon: Ravi Bernardo M.D.;  Location: SURGERY [facility];  Service: Orthopedics   • ACHILLES TENDON REPAIR Right 7/2/2018    Procedure: ACHILLES LENGTHENING;  Surgeon: Ravi Bernardo M.D.;  Location: SURGERY [facility];  Service: Orthopedics   • IRRIGATION & DEBRIDEMENT ORTHO Right 7/2/2018    Procedure: IRRIGATION & DEBRIDEMENT ORTHO;  Surgeon: Ravi Bernardo M.D.;  Location: SURGERY [facility];  Service: Orthopedics   • OTHER     • OTHER ABDOMINAL SURGERY     • OTHER ORTHOPEDIC SURGERY     • TONSILLECTOMY     • VASECTOMY         Allergies:  Patient has no known allergies.    Social History:  Social History     Socioeconomic History   • Marital status:      Spouse name: Not on file   • Number of children: Not on file   • Years of education: Not on file   • Highest education level: 12th grade   Occupational History   • Not on file   Tobacco Use   • Smoking status: Never Smoker   • Smokeless tobacco: Never Used   Vaping Use   • Vaping Use: Never used   Substance and Sexual Activity   • Alcohol use: No   • Drug use: Yes     Comment: CBD Gummies   • Sexual activity: Not on file   Other Topics Concern   • Not on file   Social History Narrative   • Not on file     Social Determinants of Health     Financial Resource Strain: Low Risk    • Difficulty of Paying Living Expenses: Not hard at all   Food Insecurity: No Food Insecurity   • Worried About Running Out of Food in the Last Year: Never true   • Ran Out of Food in the Last Year: Never true   Transportation Needs: No Transportation Needs   • Lack of Transportation (Medical): No   • Lack of Transportation (Non-Medical): No   Physical Activity: Insufficiently Active   • Days of Exercise per Week: 3 days   • Minutes of Exercise per Session:  30 min   Stress: No Stress Concern Present   • Feeling of Stress : Not at all   Social Connections: Moderately Isolated   • Frequency of Communication with Friends and Family: More than three times a week   • Frequency of Social Gatherings with Friends and Family: More than three times a week   • Attends Zoroastrian Services: Never   • Active Member of Clubs or Organizations: No   • Attends Club or Organization Meetings: Never   • Marital Status:    Intimate Partner Violence:    • Fear of Current or Ex-Partner:    • Emotionally Abused:    • Physically Abused:    • Sexually Abused:        Family History:  Family History   Problem Relation Age of Onset   • No Known Problems Mother    • Diabetes Father    • Heart Disease Father    • Diabetes Maternal Grandmother    • Heart Disease Maternal Grandfather    • Lung Disease Paternal Grandmother    • Cancer Paternal Grandmother    • Cancer Paternal Grandfather    • Lung Cancer Paternal Grandfather        Medications:    Current Outpatient Medications:   •  Dulaglutide 0.75 MG/0.5ML Solution Pen-injector, Inject 0.5 mL under the skin every 7 days., Disp: , Rfl:   •  carvedilol (COREG) 6.25 MG Tab, Take 1 Tablet by mouth 2 times a day., Disp: 180 Tablet, Rfl: 2  •  gabapentin (NEURONTIN) 100 MG Cap, Take 1 Capsule by mouth at bedtime., Disp: 90 Capsule, Rfl: 0  •  Empagliflozin (JARDIANCE) 10 MG Tab, Take 10 mg by mouth every day., Disp: , Rfl:   •  aspirin 81 MG EC tablet, Take 1 Tablet by mouth every day., Disp: 90 Tablet, Rfl: 3  •  cholestyramine (QUESTRAN) 4 GM/DOSE powder, Take 4 g by mouth 2 times a day. Mixed with 4-6 oz water. Pt takes at 1000 and 1700.  Hold all other PO medications an hour before and 4 hours after dosing., Disp: , Rfl:   •  insulin glargine (LANTUS) 100 UNIT/ML Solution, Inject 10-20 Units under the skin every evening. Units change based upon elevated blood sugar, Disp: , Rfl:   •  atorvastatin (LIPITOR) 80 MG tablet, Take 1 tablet by mouth  "every evening., Disp: 90 tablet, Rfl: 3  •  omeprazole (PRILOSEC) 20 MG delayed-release capsule, Take 20 mg by mouth every morning., Disp: , Rfl:     Labs: Reviewed    Physical Examination:  Vital signs: There were no vitals taken for this visit. There is no height or weight on file to calculate BMI.    Assessment and Plan:    1. DM2  · Pt presents to clinic today doing well overall. He recently had a toe amputation that is limiting his physical activity.  · Pt reported SMBG are at goal most of the time unless he \"cheats\" w/ carbs - states this is rare.  · Pt is currently enrolled in patient assistance for both Jardiance and Trulicity - he is tolerating these medications well w/ no c/o SE. Counseled pt in detail regarding MOA, SE's, and administration of these meds.  · Pt currently waiting on response from  in regard to his Basaglar assistance. He states he has just under two weeks supply of insulin at this time.  · Pt is very knowledgeable about DM and it's corresponding management    - Medication changes - Continue present management     - Lifestyle changes -   · Diet: Continue to maximize lean proteins and veggies. Continue to cut out/down on carbs. Avoid simple sugars.   · Exercise: Increase as tolerated    FU: 2 months    Zoltan Womack, PharmD  10/18/21    CC:   Florinda Pascual M.D.      "

## 2021-10-18 NOTE — PROCEDURES
CSWD with scissors and forceps to remove non viable tissue from wound bed (Left foot only). <1 cm2 removed. Patient tolerated well.

## 2021-10-18 NOTE — PATIENT INSTRUCTIONS
-Keep your wound dressing clean, dry, and intact.    -Change your dressing if it becomes soiled, soaked, or falls off.    -Should you experience any significant changes in your wound(s), such as infection (redness, swelling, localized heat, increased pain, fever > 101 F, chills) or have any questions regarding your home care instructions, please contact the wound center at [phone]. If after hours, contact your primary care physician or go to the hospital emergency room.

## 2021-10-18 NOTE — PROGRESS NOTES
Subjective:     CC:   Chief Complaint   Patient presents with   • Follow-Up       HPI:   Beni presents today for follow-up.  Patient did have amputation done to his foot.  He is seeing wound care for that at this time.  Patient just saw all form the pharmacotherapy for his diabetes.  Plan is for him to follow-up in January.  Patient states that her his fingersticks in the morning range about 1 10 in the evening about 140-150.  Patient is on his Lantus taking 10 to 15 units every evening.  She does not want to get a flu vaccine    Past Medical History:   Diagnosis Date   • Diabetes (HCC)    • Hx of heart artery stent 2019   • Hyperlipidemia    • Hypertension        Social History     Tobacco Use   • Smoking status: Never Smoker   • Smokeless tobacco: Never Used   Vaping Use   • Vaping Use: Never used   Substance Use Topics   • Alcohol use: No   • Drug use: Yes     Comment: CBD Gummies       Current Outpatient Medications Ordered in Epic   Medication Sig Dispense Refill   • Dulaglutide 0.75 MG/0.5ML Solution Pen-injector Inject 0.5 mL under the skin every 7 days.     • carvedilol (COREG) 6.25 MG Tab Take 1 Tablet by mouth 2 times a day. 180 Tablet 2   • gabapentin (NEURONTIN) 100 MG Cap Take 1 Capsule by mouth at bedtime. 90 Capsule 0   • Empagliflozin (JARDIANCE) 10 MG Tab Take 10 mg by mouth every day.     • aspirin 81 MG EC tablet Take 1 Tablet by mouth every day. 90 Tablet 3   • cholestyramine (QUESTRAN) 4 GM/DOSE powder Take 4 g by mouth 2 times a day. Mixed with 4-6 oz water.  Pt takes at 1000 and 1700.   Hold all other PO medications an hour before and 4 hours after dosing.     • insulin glargine (LANTUS) 100 UNIT/ML Solution Inject 10-20 Units under the skin every evening. Units change based upon elevated blood sugar     • atorvastatin (LIPITOR) 80 MG tablet Take 1 tablet by mouth every evening. 90 tablet 3   • omeprazole (PRILOSEC) 20 MG delayed-release capsule Take 20 mg by mouth every morning.       No  current Epic-ordered facility-administered medications on file.       Allergies:  Patient has no known allergies.    Health Maintenance: Completed    ROS:  Gen: no fevers/chills  Eyes: no changes in vision  ENT: no sore throat, no hearing loss, no bloody nose  Pulm: no sob, no cough  CV: no chest pain, no palpitations  GI: no nausea/vomiting, no diarrhea  : no dysuria  MSk: no myalgias  Skin: no rash  Neuro: no headaches, no numbness/tingling  Heme/Lymph: no easy bruising    Objective:     Exam:  /86   Pulse 77   Temp 36.2 °C (97.2 °F)   Resp 16   Ht 1.829 m (6')   Wt 97.6 kg (215 lb 3.2 oz)   SpO2 97%   BMI 29.19 kg/m²  Body mass index is 29.19 kg/m².    Gen: Alert and oriented, No apparent distress.  Skin: Warm and dry.  No obvious lesions.  Eyes: Sclera wnl Pupils normal in size  Lungs: Normal effort, CTA bilaterally, no wheezes, rhonchi, or rales  CV: Regular rate and rhythm. No murmurs, rubs, or gallops.  Musculoskeletal: Normal gait. No extremity cyanosis, clubbing, or edema.  Neuro: Oriented to person, place and time  Psych: Mood is wnl       Assessment & Plan:     51 y.o. male with the following -     1. Essential hypertension  Blood pressure is at goal we will continue present meds this is a chronic problem   2. S/P transmetatarsal amputation of foot, right (HCC)  Patient to follow-up in a wound care as planned this is acute problem    3. Type 2 diabetes mellitus with hyperglycemia, with long-term current use of insulin (HCC)  I would recommend I see him back in 1 month or sooner if his fingersticks increase.  This is a chronic problem  4. Osteomyelitis    Return in about 4 weeks (around 11/15/2021).    Please note that this dictation was created using voice recognition software. I have made every reasonable attempt to correct obvious errors, but I expect that there are errors of grammar and possibly content that I did not discover before finalizing the note.

## 2021-10-25 ENCOUNTER — NON-PROVIDER VISIT (OUTPATIENT)
Dept: WOUND CARE | Facility: MEDICAL CENTER | Age: 51
End: 2021-10-25
Attending: FAMILY MEDICINE
Payer: MEDICARE

## 2021-10-25 PROCEDURE — 97597 DBRDMT OPN WND 1ST 20 CM/<: CPT

## 2021-10-25 NOTE — PATIENT INSTRUCTIONS
-Keep your wound dressing clean, dry, and intact.    -Change your dressing if it becomes soiled, soaked, or falls off.    -Should you experience any significant changes in your wound(s), such as infection (redness, swelling, localized heat, increased pain, fever > 101 F, chills) or have any questions regarding your home care instructions, please contact the wound center at [phone]. If after hours, contact your primary care physician or go to the hospital emergency room.

## 2021-10-25 NOTE — PROCEDURES
Patient denies need for viscous lidocaine to be applied topically to wound bed prior to debridement. CSWD with curette to debride wound bed and periwound of non-viable tissue. Entire surface of wound, < 20 cm2 debrided. Patient tolerated the procedure well.

## 2021-10-27 DIAGNOSIS — Z79.4 TYPE 2 DIABETES MELLITUS WITH HYPERGLYCEMIA, WITH LONG-TERM CURRENT USE OF INSULIN (HCC): ICD-10-CM

## 2021-10-27 DIAGNOSIS — E11.65 TYPE 2 DIABETES MELLITUS WITH HYPERGLYCEMIA, WITH LONG-TERM CURRENT USE OF INSULIN (HCC): ICD-10-CM

## 2021-10-28 RX ORDER — INSULIN GLARGINE 100 [IU]/ML
INJECTION, SOLUTION SUBCUTANEOUS
Qty: 10 ML | Refills: 2 | Status: SHIPPED | OUTPATIENT
Start: 2021-10-28 | End: 2021-10-28 | Stop reason: SDUPTHER

## 2021-10-28 RX ORDER — INSULIN GLARGINE 100 [IU]/ML
INJECTION, SOLUTION SUBCUTANEOUS
Qty: 10 ML | Refills: 2 | Status: SHIPPED | OUTPATIENT
Start: 2021-10-28 | End: 2022-10-25 | Stop reason: SDUPTHER

## 2021-10-28 RX ORDER — INSULIN GLARGINE 100 [IU]/ML
INJECTION, SOLUTION SUBCUTANEOUS
Qty: 6 EACH | Refills: 1 | Status: SHIPPED | OUTPATIENT
Start: 2021-10-28 | End: 2021-10-28 | Stop reason: SDUPTHER

## 2021-11-02 ENCOUNTER — NON-PROVIDER VISIT (OUTPATIENT)
Dept: WOUND CARE | Facility: MEDICAL CENTER | Age: 51
End: 2021-11-02
Attending: FAMILY MEDICINE
Payer: MEDICARE

## 2021-11-02 PROCEDURE — 97597 DBRDMT OPN WND 1ST 20 CM/<: CPT

## 2021-11-02 NOTE — PROCEDURES
CSWD using curette to remove approx. ~0.75cm2 of nonviable tissue from wound bed.    Sutures removed per order by Mac BERMEO.

## 2021-11-02 NOTE — PATIENT INSTRUCTIONS
Should you experience any significant changes in your wound(s) such as infection (redness, swelling, localized heat, increased pain, fever >101 F, chills) or have any questions regarding your home care instructions, please contact the wound center [phone]. If after hours, contact your primary care physician or go the hospital emergency room.  Keep dressing clean and dry and cover while bathing. Only change dressing if over saturated, soiled or its falling off.

## 2021-11-09 ENCOUNTER — APPOINTMENT (OUTPATIENT)
Dept: WOUND CARE | Facility: MEDICAL CENTER | Age: 51
End: 2021-11-09
Attending: FAMILY MEDICINE
Payer: MEDICARE

## 2021-11-16 ENCOUNTER — OFFICE VISIT (OUTPATIENT)
Dept: WOUND CARE | Facility: MEDICAL CENTER | Age: 51
End: 2021-11-16
Attending: FAMILY MEDICINE
Payer: MEDICARE

## 2021-11-16 VITALS
OXYGEN SATURATION: 99 % | RESPIRATION RATE: 18 BRPM | HEART RATE: 87 BPM | SYSTOLIC BLOOD PRESSURE: 180 MMHG | DIASTOLIC BLOOD PRESSURE: 112 MMHG | TEMPERATURE: 97.7 F

## 2021-11-16 DIAGNOSIS — Z89.431 HISTORY OF TRANSMETATARSAL AMPUTATION OF RIGHT FOOT (HCC): ICD-10-CM

## 2021-11-16 DIAGNOSIS — S81.801D OPEN WOUND OF RIGHT LOWER LEG, SUBSEQUENT ENCOUNTER: ICD-10-CM

## 2021-11-16 DIAGNOSIS — R03.0 ELEVATED BLOOD PRESSURE READING: ICD-10-CM

## 2021-11-16 DIAGNOSIS — E11.65 UNCONTROLLED TYPE 2 DIABETES MELLITUS WITH HYPERGLYCEMIA (HCC): ICD-10-CM

## 2021-11-16 DIAGNOSIS — T81.31XD DEHISCENCE OF OPERATIVE WOUND, SUBSEQUENT ENCOUNTER: Primary | ICD-10-CM

## 2021-11-16 PROCEDURE — 11042 DBRDMT SUBQ TIS 1ST 20SQCM/<: CPT | Performed by: NURSE PRACTITIONER

## 2021-11-16 PROCEDURE — 99213 OFFICE O/P EST LOW 20 MIN: CPT | Mod: 25

## 2021-11-16 PROCEDURE — 99213 OFFICE O/P EST LOW 20 MIN: CPT | Mod: 25 | Performed by: NURSE PRACTITIONER

## 2021-11-16 PROCEDURE — 11042 DBRDMT SUBQ TIS 1ST 20SQCM/<: CPT

## 2021-11-16 ASSESSMENT — ENCOUNTER SYMPTOMS
COUGH: 0
PALPITATIONS: 0
DIARRHEA: 0
CHILLS: 0
CONSTIPATION: 0
NAUSEA: 0
FEVER: 0
VOMITING: 0
DEPRESSION: 0
NERVOUS/ANXIOUS: 0
SHORTNESS OF BREATH: 0

## 2021-11-16 NOTE — PROGRESS NOTES
Provider Encounter- Diabetic Foot Ulcer      HISTORY OF PRESENT ILLNESS  Wound History:    START OF CARE IN CLINIC: 10/5/2021     REFERRING PROVIDER: .Anatoly Dodge M.D.   WOUND/LOCATION-left third toe amputation site                                                  Right anterior shin full-thickness wound     HISTORY: Patient presented to [facility] ED on 9/30/2021 with an infected left third toe.  States it started as a cut which progressively worsened.  He was treating the wound himself with supplies he had leftover from previous wound clinic visits.  Prior to going into the ED, he noticed purulent drainage and foul odor.  While changing his dressing he noticed a piece of bone fall out of the wound.  He was admitted, and started on IV antibiotics.  An x-ray was done which showed osteomyelitis of the third toe.  Patient underwent amputation of his toe on the same day.  He was seen by the LPS team who cleared him for discharge and arrange for outpatient wound clinic follow-up.  He was discharged with an offloading shoe.  Patient also has a full-thickness wound to his right shin, which he states he got from bumping on a ladder.  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     Patient is well-known to this clinic from previous treatment of wounds.  Was recently seen in the clinic in July of this year, treated for burn to right heel.  He was also treated in 2018 for dehiscence of TMA site to right foot.    Pertinent Medical History: Type 2 diabetes, TMA right foot, non-STEMI, hypertension, coronary artery disease, hearing loss    DIABETES HX: Diagnosed with type 2 diabetes 23 years ago, and is currently managing with oral and injectable agents.  Checks blood sugars 3-4 times per day and reports that these typically run around 170.  Has  had previous diabetes education.  Does  have numbness in feet and lower legs.  Usually wears prescriptive shoes and inserts. Does  check his feet  routinely.  Has  had previous foot ulcers and surgery, as noted in HPI.  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  Beni states he is feeling well overall, denies fevers, chills, nausea, vomiting, cough or shortness of breath.  He was just discharged from hospital yesterday, changed the dressing to his amputation site last night.  Reports his blood sugar this morning was around 150.  He does not have a follow-up appointment with his surgeon.    10/12/2021: Clinic visit with ELVIE Yarbrough.  Patient reports that he does not have a follow-up appointment with his surgeon.  Patient instructed that he should contact his surgeon for a follow-up appointment.  Patient still has sutures from surgery intact.  Patient would benefit from the sutures remaining in place for another 1 to 2 weeks.  Will reevaluate next clinical appointment.  Patient with small open wound to right anterior lower extremity near resolution.  Debridement of approximated amputation site to remove slough and nonviable tissue.    11/16/2021 : Clinic visit with ELVIE Nichole, JO, GABI, MERA.  Beni states he is feeling well overall, denies fevers, chills, nausea, vomiting, cough or shortness of breath.  Reports his blood sugar this morning was 117.  He also states that he went hunting last week, and was walking more than usual.  Discussed need for orthotic shoes and inserts once his wound is healed.  He was reluctant to accept this idea, states he hates diabetic shoes.  Explained to him that they are regular athletic shoes with custom inserts.  Prescription to [employer] provided in clinic today.  Also discussed initiating Biologic (ACell) to  this wound to accelerate healing.  He is amenable to this idea.  We will plan to initiate next clinic visit.    REVIEW OF SYSTEMS:   Review of Systems   Constitutional: Negative for chills and fever.   HENT: Positive for hearing loss.    Respiratory: Negative for cough and shortness of breath.    Cardiovascular: Negative for chest pain, palpitations and leg swelling.   Gastrointestinal: Negative for constipation, diarrhea, nausea and vomiting.   Genitourinary: Negative for dysuria.   Skin: Negative for itching and rash.        Amputation site left third toe  Superficial wound right anterior lower extremity   Neurological:        Numbness in both feet and lower legs, radiating to about mid calf   Psychiatric/Behavioral: Negative for depression. The patient is not nervous/anxious.        PHYSICAL EXAMINATION:   BP (!) 180/112 (BP Location: Right arm, Patient Position: Sitting) Comment: RN notified  Pulse 87   Temp 36.5 °C (97.7 °F) (Temporal)   Resp 18   SpO2 99%     Physical Exam  Constitutional:       Appearance: Normal appearance. He is normal weight.   HENT:      Head: Normocephalic.      Ears:      Comments: Hearing loss  Eyes:      Pupils: Pupils are equal, round, and reactive to light.   Cardiovascular:      Pulses: Normal pulses.      Comments: Pedal pulses difficult to palpate, multiphasic by Doppler  Pulmonary:      Effort: Pulmonary effort is normal.   Skin:     General: Skin is warm.      Comments: Sutures intact to left third toe application site, slight dehiscence, moderate serosanguineous drainage, periwound macerated  Full-thickness wound to right anterior shin near resolution  Refer to wound flowsheet and photos   Neurological:      Mental Status: He is alert and oriented to person, place, and time.      Comments: Both feet insensate   Psychiatric:         Mood and Affect: Mood normal.         Behavior: Behavior normal.         Thought Content: Thought content normal.         Judgment: Judgment  normal.     Monofilament testing with a 10 gram force: sensation intact: decreased bilaterally  Visual Inspection: Feet with maceration, ulcers, fissures.  Pedal pulses: decreased bilaterally    WOUND ASSESSMENT   Wound 10/05/21 Incision Foot Left --Left Third Toe Amputation Site (Active)   Wound Image    11/16/21 0955   Site Assessment Red;Yellow 11/16/21 0955   Periwound Assessment Blanchable erythema;Edema 11/16/21 0955   Margins Unattached edges 11/16/21 0955   Drainage Amount Moderate 11/16/21 0955   Drainage Description Serosanguineous 11/16/21 0955   Treatments Cleansed;Provider debridement;Silver nitrate 11/16/21 0955   Wound Cleansing Normal Saline Irrigation 11/16/21 0955   Periwound Protectant Skin Protectant Wipes to Periwound 11/16/21 0955   Dressing Cleansing/Solutions Not Applicable 11/16/21 0955   Dressing Options Hydrofiber Silver Strip;Nonadhesive Foam;Hypafix Tape;Tubigrip 11/16/21 0955   Dressing Changed Changed 10/25/21 0900   Dressing Change/Treatment Frequency Every 48 hrs, and As Needed 10/18/21 1100   Non-staged Wound Description Full thickness 11/16/21 0955   Wound Length (cm) 1.5 cm 11/16/21 0955   Wound Width (cm) 0.5 cm 11/16/21 0955   Wound Depth (cm) 1.2 cm 11/16/21 0955   Wound Surface Area (cm^2) 0.75 cm^2 11/16/21 0955   Wound Volume (cm^3) 0.9 cm^3 11/16/21 0955   Post-Procedure Length (cm) 1.6 cm 11/16/21 0955   Post-Procedure Width (cm) 0.7 cm 11/16/21 0955   Post-Procedure Depth (cm) 1.2 cm 11/16/21 0955   Post-Procedure Surface Area (cm^2) 1.12 cm^2 11/16/21 0955   Post-Procedure Volume (cm^3) 1.344 cm^3 11/16/21 0955   Wound Healing % -1900 11/16/21 0955   Wound Bed Granulation (%) - Post-Procedure 5 % 11/02/21 1400   Wound Bed Slough % - (Post-Procedure) 95 % 11/02/21 1400   Tunneling (cm) 0 cm 11/16/21 0955   Undermining (cm) 0 cm 11/16/21 0955   Wound Odor None 11/16/21 0955   Pulses Doppler 10/05/21 1415   Left Foot Monofilament 10-point exam (Sensate) 0/10 10/05/21 1415    Exposed Structures None 21 0955          PROCEDURE: Excisional debridement of right shin wound  -2% viscous lidocaine applied topically to wound bed for approximately 5 minutes prior to debridement  -Curette used to debride wound bed.  Excisional debridement was performed to remove devitalized tissue until healthy, bleeding tissue was visualized.   Entire surface of wound, 1.12 cm2 debrided.  Tissue debrided into the subcutaneous layer.    -Bleeding controlled with manual pressure.    -Wound care completed by wound RN, refer to flowsheet   -Patient tolerated the procedure well, without c/o pain or discomfort.       Pertinent Labs and Diagnostics:    Labs:     A1c:   Lab Results   Component Value Date/Time    HBA1C 7.5 (H) 2021 07:16 PM          IMAGIN2021-three-view x-ray left foot  IMPRESSION:        Osseous destruction involving the left third mid and distal phalanges, in keeping with osteomyelitis.      VASCULAR STUDIES: No recent studies found in epic    LAST  WOUND CULTURE: 2021-OR cultures  Lab Results   Component Value Date/Time    CULTRSULT (A) 2021 02:07 PM     Growth noted after further incubation, see below for  organism identification.      CULTRSULT Staphylococcus simulans  Rare growth   (A) 2021 02:07 PM    CULTRSULT Streptococcus constellatus  Rare growth   (A) 2021 02:07 PM    CULTRSULT No Anaerobes isolated. (A) 2021 02:07 PM    CULTRSULT Parvimonas micra  Rare growth   (A) 2021 02:07 PM    CULTRSULT - (A) 2021 02:07 PM             ASSESSMENT AND PLAN:     1. Dehiscence of operative wound, subsequent encounter  Comments: Amputation of left third toe due to toe amp on 2021.  Partial dehiscence near center of incision    2021: Wound area has increased since last assessment. Patient admits he was walking more than usual last week because he went hunting.    -Excisional debridement of wound in clinic today, medically necessary  to promote wound healing.  -Patient to return to clinic weekly for assessment and debridement  -Patient to change dressing 1-2 times per week in between clinic visits  -Plan to initiate [employer] next clinic visit.    Wound care: Hydrofiber silver, nonadhesive foam, Hypafix tape, Tubigrip E, offloading shoe    2. Open wound of right lower leg, subsequent encounter  Comments: Traumatic wound from bumping on ladder.  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 Uncontrolled type 2 diabetes mellitus with hyperglycemia (HCC)  Comments: A1c checked during hospitalization, 7.5    11/16/2021: Patient states his blood sugar today was 117  -Patient encouraged to keep his blood sugars below 140 in order to optimize wound healing  -Consider referral to endocrinology    4. Diabetic polyneuropathy associated with type 2 diabetes mellitus (HCC)  Comments: Monofilament testing in clinic on initial evaluation, both feet insensate.    11/16/2021:  - Implications of loss of protective sensation (LOPS) reviewed with patient- including increased risk for amputation.  Advised to check feet at least daily, moisturize feet, and to always wear protective foot wear.     5. History of transmetatarsal amputation of right foot (HCC)  Comments: Patient underwent right TMA in 2018, prior to that he had undergone amputations of toes.  He is at high risk for further amputations.      6. Elevated blood pressure reading    11/16/2021: Patient's blood pressure today 180/112. He states he did take his blood pressure medication. He denies headache, dizziness, confusion, numbness, chest pain or chest pressure.   - Risk of untreated hypertension discussed, including stroke, heart attack, sudden death.  Patient advised to call 911 for any of these symptoms.   -Patient states he has a blood pressure monitor at home, will check his blood pressure periodically, notify his PCP if they remain  elevated.          PATIENT EDUCATION  - Importance of tight glucose control for wound healing   - Implications of loss of protective sensation (LOPS) discussed with patient- including increased risk for amputation.  - Advised to check feet at least daily, moisturize feet, and to always wear protective foot wear.   -  Importance of offloading foot to assist with wound healing  - Advised pt not to trim nails or calluses, seek foot/nail care from podiatrist or certified foot/nail nurse  - Importance of adequate nutrition for wound healing    My total time spent caring for the patient on the day of the encounter was 20 minutes.   This does not include time spent on separately billable procedures/tests.    Please note that this note may have been created using voice recognition software. I have worked with technical experts from [facility] [employer] to optimize the interface.  I have made every reasonable attempt to correct obvious errors, but there may be errors of grammar and possibly content that I did not discover before finalizing the note.

## 2021-11-16 NOTE — PROGRESS NOTES
Requested auth or [employer] via green sheet.  Patient given prescription for diabetic shoes and inserts from  [facility].

## 2021-11-23 ENCOUNTER — OFFICE VISIT (OUTPATIENT)
Dept: WOUND CARE | Facility: MEDICAL CENTER | Age: 51
End: 2021-11-23
Attending: FAMILY MEDICINE
Payer: MEDICARE

## 2021-11-23 VITALS
DIASTOLIC BLOOD PRESSURE: 90 MMHG | RESPIRATION RATE: 17 BRPM | OXYGEN SATURATION: 98 % | TEMPERATURE: 96.7 F | SYSTOLIC BLOOD PRESSURE: 153 MMHG | HEART RATE: 90 BPM

## 2021-11-23 DIAGNOSIS — T81.31XD DEHISCENCE OF OPERATIVE WOUND, SUBSEQUENT ENCOUNTER: Primary | ICD-10-CM

## 2021-11-23 DIAGNOSIS — E11.65 UNCONTROLLED TYPE 2 DIABETES MELLITUS WITH HYPERGLYCEMIA (HCC): ICD-10-CM

## 2021-11-23 DIAGNOSIS — E11.42 DIABETIC POLYNEUROPATHY ASSOCIATED WITH TYPE 2 DIABETES MELLITUS (HCC): ICD-10-CM

## 2021-11-23 DIAGNOSIS — Z89.431 HISTORY OF TRANSMETATARSAL AMPUTATION OF RIGHT FOOT (HCC): ICD-10-CM

## 2021-11-23 PROCEDURE — C5271 LOW COST SKIN SUBSTITUTE APP: HCPCS

## 2021-11-23 PROCEDURE — 15271 SKIN SUB GRAFT TRNK/ARM/LEG: CPT | Performed by: NURSE PRACTITIONER

## 2021-11-23 PROCEDURE — 15275 SKIN SUB GRAFT FACE/NK/HF/G: CPT

## 2021-11-23 PROCEDURE — 99213 OFFICE O/P EST LOW 20 MIN: CPT | Mod: 25 | Performed by: NURSE PRACTITIONER

## 2021-11-23 PROCEDURE — 99213 OFFICE O/P EST LOW 20 MIN: CPT | Mod: 25

## 2021-11-23 ASSESSMENT — ENCOUNTER SYMPTOMS
CONSTIPATION: 0
CHILLS: 0
DEPRESSION: 0
PALPITATIONS: 0
DIARRHEA: 0
VOMITING: 0
FEVER: 0
NERVOUS/ANXIOUS: 0
SHORTNESS OF BREATH: 0
COUGH: 0
NAUSEA: 0

## 2021-11-23 NOTE — PROGRESS NOTES
Provider Encounter- Diabetic Foot Ulcer      HISTORY OF PRESENT ILLNESS  Wound History:    START OF CARE IN CLINIC: 10/5/2021     REFERRING PROVIDER: .Anatoly Dodge M.D.   WOUND/LOCATION-left third toe amputation site                                                  Right anterior shin full-thickness wound     HISTORY: Patient presented to [facility] ED on 9/30/2021 with an infected left third toe.  States it started as a cut which progressively worsened.  He was treating the wound himself with supplies he had leftover from previous wound clinic visits.  Prior to going into the ED, he noticed purulent drainage and foul odor.  While changing his dressing he noticed a piece of bone fall out of the wound.  He was admitted, and started on IV antibiotics.  An x-ray was done which showed osteomyelitis of the third toe.  Patient underwent amputation of his toe on the same day.  He was seen by the LPS team who cleared him for discharge and arrange for outpatient wound clinic follow-up.  He was discharged with an offloading shoe.  Patient also has a full-thickness wound to his right shin, which he states he got from bumping on a ladder.  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     Patient is well-known to this clinic from previous treatment of wounds.  Was recently seen in the clinic in July of this year, treated for burn to right heel.  He was also treated in 2018 for dehiscence of TMA site to right foot.    Pertinent Medical History: Type 2 diabetes, TMA right foot, non-STEMI, hypertension, coronary artery disease, hearing loss    DIABETES HX: Diagnosed with type 2 diabetes 23 years ago, and is currently managing with oral and injectable agents.  Checks blood sugars 3-4 times per day and reports that these typically run around 170.  Has  had previous diabetes education.  Does  have numbness in feet and lower legs.  Usually wears prescriptive shoes and inserts. Does  check his feet  routinely.  Has  had previous foot ulcers and surgery, as noted in HPI.  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  Beni states he is feeling well overall, denies fevers, chills, nausea, vomiting, cough or shortness of breath.  He was just discharged from hospital yesterday, changed the dressing to his amputation site last night.  Reports his blood sugar this morning was around 150.  He does not have a follow-up appointment with his surgeon.    10/12/2021: Clinic visit with ELVIE Yarbrough.  Patient reports that he does not have a follow-up appointment with his surgeon.  Patient instructed that he should contact his surgeon for a follow-up appointment.  Patient still has sutures from surgery intact.  Patient would benefit from the sutures remaining in place for another 1 to 2 weeks.  Will reevaluate next clinical appointment.  Patient with small open wound to right anterior lower extremity near resolution.  Debridement of approximated amputation site to remove slough and nonviable tissue.    11/16/2021 : Clinic visit with ELVIE Nichole, JO, GABI, MERA.  Beni states he is feeling well overall, denies fevers, chills, nausea, vomiting, cough or shortness of breath.  Reports his blood sugar this morning was 117.  He also states that he went hunting last week, and was walking more than usual.  Discussed need for orthotic shoes and inserts once his wound is healed.  He was reluctant to accept this idea, states he hates diabetic shoes.  Explained to him that they are regular athletic shoes with custom inserts.  Prescription to [employer] provided in clinic today.  Also discussed initiating Biologic (ACell) to  this wound to accelerate healing.  He is amenable to this idea.  We will plan to initiate next clinic visit.    11/23/2021: Clinic visit with ELVIE Yarbrough.     REVIEW OF SYSTEMS:   Review of Systems   Constitutional: Negative for chills and fever.   HENT: Positive for hearing loss.    Respiratory: Negative for cough and shortness of breath.    Cardiovascular: Negative for chest pain, palpitations and leg swelling.   Gastrointestinal: Negative for constipation, diarrhea, nausea and vomiting.   Genitourinary: Negative for dysuria.   Skin: Negative for itching and rash.        Open wound to amputation site   Neurological:        Numbness in both feet and lower legs, radiating to about mid calf   Psychiatric/Behavioral: Negative for depression. The patient is not nervous/anxious.        PHYSICAL EXAMINATION:   /90 (BP Location: Left arm, Patient Position: Sitting)   Pulse 90   Temp 35.9 °C (96.7 °F) (Temporal)   Resp 17   SpO2 98%     Physical Exam  Constitutional:       Appearance: Normal appearance. He is normal weight.   HENT:      Head: Normocephalic.      Ears:      Comments: Hearing loss  Eyes:      Pupils: Pupils are equal, round, and reactive to light.   Cardiovascular:      Pulses: Normal pulses.      Comments: Pedal pulses difficult to palpate, multiphasic by Doppler  Pulmonary:      Effort: Pulmonary effort is normal.   Skin:     General: Skin is warm.      Comments: Dehiscence of surgical incision site  Refer to wound flowsheet and photos   Neurological:      Mental Status: He is alert and oriented to person, place, and time.      Comments: Both feet insensate   Psychiatric:         Mood and Affect: Mood normal.         Behavior: Behavior normal.         Thought Content: Thought content normal.         Judgment: Judgment normal.     Monofilament testing with a 10 gram force: sensation intact: decreased bilaterally  Visual Inspection: Feet with maceration, ulcers, fissures.  Pedal pulses:  decreased bilaterally    WOUND ASSESSMENT         Wound 10/05/21 Incision Foot Left --Left Third Toe Amputation Site (Active)   Wound Image    11/23/21 0900   Site Assessment Red;Yellow 11/23/21 0900   Periwound Assessment Blanchable erythema;Edema 11/23/21 0900   Margins Unattached edges 11/23/21 0900   Drainage Amount Moderate 11/23/21 0900   Drainage Description Serosanguineous 11/23/21 0900   Treatments Cleansed;Provider debridement 11/23/21 0900   Wound Cleansing Normal Saline Irrigation 11/23/21 0900   Periwound Protectant Skin Protectant Wipes to Periwound 11/23/21 0900   Dressing Cleansing/Solutions Not Applicable 11/23/21 0900   Dressing Options Biologic (Skin Substitute);Adaptic;Steri-Strip;Hydrofiber;Nonadhesive Foam;Hypafix Tape;Tubigrip 11/23/21 0900   Dressing Changed Changed 10/25/21 0900   Dressing Change/Treatment Frequency Every 48 hrs, and As Needed 10/18/21 1100   Non-staged Wound Description Full thickness 11/23/21 0900   Wound Length (cm) 1.2 cm 11/23/21 0900   Wound Width (cm) 0.4 cm 11/23/21 0900   Wound Depth (cm) 1 cm 11/23/21 0900   Wound Surface Area (cm^2) 0.48 cm^2 11/23/21 0900   Wound Volume (cm^3) 0.48 cm^3 11/23/21 0900   Post-Procedure Length (cm) 1.3 cm 11/23/21 0900   Post-Procedure Width (cm) 0.4 cm 11/23/21 0900   Post-Procedure Depth (cm) 1.1 cm 11/23/21 0900   Post-Procedure Surface Area (cm^2) 0.52 cm^2 11/23/21 0900   Post-Procedure Volume (cm^3) 0.572 cm^3 11/23/21 0900   Wound Healing % -967 11/23/21 0900   Wound Bed Granulation (%) - Post-Procedure 5 % 11/02/21 1400   Wound Bed Slough % - (Post-Procedure) 95 % 11/02/21 1400   Tunneling (cm) 0 cm 11/23/21 0900   Undermining (cm) 0 cm 11/23/21 0900   Wound Odor None 11/23/21 0900   Pulses Doppler 10/05/21 1415   Left Foot Monofilament 10-point exam (Sensate) 0/10 10/05/21 1415   Exposed Structures None 11/23/21 0900               PROCEDURE: Excisional debridement of right shin wound  -2% viscous lidocaine applied topically  to wound bed for approximately 5 minutes prior to debridement  -Curette used to debride wound bed.  Excisional debridement was performed to remove devitalized tissue until healthy, bleeding tissue was visualized.   Entire surface of wound, 0.52 cm2 debrided.  Tissue debrided into the subcutaneous layer.    -Bleeding controlled with manual pressure.    -Wound care completed by wound RN, refer to flowsheet   -Patient tolerated the procedure well, without c/o pain or discomfort.     ACell log:  First application 2021.  Cytal wound matrix 1 layer 3 x 3.5 cm lot #[id number] reference number [id number] serial number [id number]      Pertinent Labs and Diagnostics:    Labs:     A1c:   Lab Results   Component Value Date/Time    HBA1C 7.5 (H) 2021 07:16 PM          IMAGIN2021-three-view x-ray left foot  IMPRESSION:        Osseous destruction involving the left third mid and distal phalanges, in keeping with osteomyelitis.      VASCULAR STUDIES: No recent studies found in epic    LAST  WOUND CULTURE: 2021-OR cultures  Lab Results   Component Value Date/Time    CULTRSULT (A) 2021 02:07 PM     Growth noted after further incubation, see below for  organism identification.      CULTRSULT Staphylococcus simulans  Rare growth   (A) 2021 02:07 PM    CULTRSULT Streptococcus constellatus  Rare growth   (A) 2021 02:07 PM    CULTRSULT No Anaerobes isolated. (A) 2021 02:07 PM    CULTRSULT Parvimonas micra  Rare growth   (A) 2021 02:07 PM    CULTRSULT - (A) 2021 02:07 PM             ASSESSMENT AND PLAN:     1. Dehiscence of operative wound, subsequent encounter  Comments: Amputation of left third toe due to toe amp on 2021.  Partial dehiscence near center of incision    2021: Wound area approximately the same [employer]Cell initiated in clinic today.    -Excisional debridement of wound in clinic today, medically necessary to promote wound healing.  -Patient to return to clinic weekly  for assessment and debridement  -Patient to change dressing 1-2 times per week in between clinic visits    Wound care: Biologic, Adaptic, Steri-Strips, Hydrofiber, nonadhesive foam, Hypafix tape, Tubigrip D      2. Uncontrolled type 2 diabetes mellitus with hyperglycemia (HCC)  Comments: A1c checked during hospitalization, 7.5    11/23/2021: Patient states his blood sugar today was 117  -Patient encouraged to keep his blood sugars below 140 in order to optimize wound healing    3. Diabetic polyneuropathy associated with type 2 diabetes mellitus (HCC)  Comments: Monofilament testing in clinic on initial evaluation, both feet insensate.    11/23/2021:  - Implications of loss of protective sensation (LOPS) reviewed with patient- including increased risk for amputation.  Advised to check feet at least daily, moisturize feet, and to always wear protective foot wear.     4. History of transmetatarsal amputation of right foot ([facility])  Comments: Patient underwent right TMA in 2018, prior to that he had undergone amputations of toes.  He is at high risk for further amputations.        PATIENT EDUCATION  - Importance of tight glucose control for wound healing   - Implications of loss of protective sensation (LOPS) discussed with patient- including increased risk for amputation.  - Advised to check feet at least daily, moisturize feet, and to always wear protective foot wear.   -  Importance of offloading foot to assist with wound healing  - Advised pt not to trim nails or calluses, seek foot/nail care from podiatrist or certified foot/nail nurse  - Importance of adequate nutrition for wound healing    My total time spent caring for the patient on the day of the encounter was 20 minutes.   This does not include time spent on separately billable procedures/tests.    Please note that this note may have been created using voice recognition software. I have worked with technical experts from [employer] to optimize the interface.   I have made every reasonable attempt to correct obvious errors, but there may be errors of grammar and possibly content that I did not discover before finalizing the note.

## 2021-11-23 NOTE — PATIENT INSTRUCTIONS
-Keep dressings clean and dry. Change dressings once between wound clinic visits, and if the dressings become saturated, soiled, or fall off.    -Avoid prolonged standing or sitting without elevating your legs.    -Remove your compression garments if you have severe pain, severe swelling, numbness, color change, or temperature change in your toes. If you need to remove your compression garments, do so by unrolling them. Do not cut the compression garments off, this is to prevent cutting yourself on accident.    -Never walk around the house barefoot. Always wear a rubber soled slipper when walking around the house.    -Should you experience any significant changes in your wound(s), such as signs of infection (increasing redness, swelling, localized heat, increased pain, fever > 101 F, chills) or have any questions regarding your home care instructions, please contact the wound center at [phone]. If after hours, contact your primary care physician or go to the hospital emergency room.     -If you are 5 or more minutes late for an appointment, we reserve the right to cancel and reschedule that appointment. Additionally, if you are habitually late or not showing (3 late cancellations and/or no shows), we reserve the right to cancel your remaining appointments and it will be your responsibility to obtain a new referral if services are still needed.

## 2021-11-30 ENCOUNTER — OFFICE VISIT (OUTPATIENT)
Dept: WOUND CARE | Facility: MEDICAL CENTER | Age: 51
End: 2021-11-30
Attending: FAMILY MEDICINE
Payer: MEDICARE

## 2021-11-30 VITALS
HEART RATE: 82 BPM | DIASTOLIC BLOOD PRESSURE: 101 MMHG | OXYGEN SATURATION: 99 % | SYSTOLIC BLOOD PRESSURE: 169 MMHG | TEMPERATURE: 97.9 F | RESPIRATION RATE: 18 BRPM

## 2021-11-30 DIAGNOSIS — T81.31XD DEHISCENCE OF OPERATIVE WOUND, SUBSEQUENT ENCOUNTER: ICD-10-CM

## 2021-11-30 DIAGNOSIS — Z89.431 HISTORY OF TRANSMETATARSAL AMPUTATION OF RIGHT FOOT (HCC): ICD-10-CM

## 2021-11-30 DIAGNOSIS — E11.65 UNCONTROLLED TYPE 2 DIABETES MELLITUS WITH HYPERGLYCEMIA (HCC): ICD-10-CM

## 2021-11-30 DIAGNOSIS — E11.42 DIABETIC POLYNEUROPATHY ASSOCIATED WITH TYPE 2 DIABETES MELLITUS (HCC): ICD-10-CM

## 2021-11-30 PROCEDURE — 99212 OFFICE O/P EST SF 10 MIN: CPT

## 2021-11-30 PROCEDURE — 99213 OFFICE O/P EST LOW 20 MIN: CPT | Performed by: NURSE PRACTITIONER

## 2021-11-30 ASSESSMENT — ENCOUNTER SYMPTOMS
NERVOUS/ANXIOUS: 0
COUGH: 0
FEVER: 0
PALPITATIONS: 0
DIARRHEA: 0
SHORTNESS OF BREATH: 0
NAUSEA: 0
DEPRESSION: 0
VOMITING: 0
CHILLS: 0
CONSTIPATION: 0

## 2021-11-30 NOTE — PROGRESS NOTES
Provider Encounter- Diabetic Foot Ulcer      HISTORY OF PRESENT ILLNESS  Wound History:    START OF CARE IN CLINIC: 10/5/2021     REFERRING PROVIDER: .Anatoly Dodge M.D.   WOUND/LOCATION-left third toe amputation site                                                  Right anterior shin full-thickness wound     HISTORY: Patient presented to [facility] ED on 9/30/2021 with an infected left third toe.  States it started as a cut which progressively worsened.  He was treating the wound himself with supplies he had leftover from previous wound clinic visits.  Prior to going into the ED, he noticed purulent drainage and foul odor.  While changing his dressing he noticed a piece of bone fall out of the wound.  He was admitted, and started on IV antibiotics.  An x-ray was done which showed osteomyelitis of the third toe.  Patient underwent amputation of his toe on the same day.  He was seen by the LPS team who cleared him for discharge and arrange for outpatient wound clinic follow-up.  He was discharged with an offloading shoe.  Patient also has a full-thickness wound to his right shin, which he states he got from bumping on a ladder.  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     Patient is well-known to this clinic from previous treatment of wounds.  Was recently seen in the clinic in July of this year, treated for burn to right heel.  He was also treated in 2018 for dehiscence of TMA site to right foot.    Pertinent Medical History: Type 2 diabetes, TMA right foot, non-STEMI, hypertension, coronary artery disease, hearing loss    DIABETES HX: Diagnosed with type 2 diabetes 23 years ago, and is currently managing with oral and injectable agents.  Checks blood sugars 3-4 times per day and reports that these typically run around 170.  Has  had previous diabetes education.  Does  have numbness in feet and lower legs.  Usually wears prescriptive shoes and inserts. Does  check his feet  routinely.  Has  had previous foot ulcers and surgery, as noted in HPI.  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  Beni states he is feeling well overall, denies fevers, chills, nausea, vomiting, cough or shortness of breath.  He was just discharged from hospital yesterday, changed the dressing to his amputation site last night.  Reports his blood sugar this morning was around 150.  He does not have a follow-up appointment with his surgeon.    10/12/2021: Clinic visit with ELVIE Yarbrough.  Patient reports that he does not have a follow-up appointment with his surgeon.  Patient instructed that he should contact his surgeon for a follow-up appointment.  Patient still has sutures from surgery intact.  Patient would benefit from the sutures remaining in place for another 1 to 2 weeks.  Will reevaluate next clinical appointment.  Patient with small open wound to right anterior lower extremity near resolution.  Debridement of approximated amputation site to remove slough and nonviable tissue.    11/16/2021 : Clinic visit with ELVIE Nichole, JO, GABI, MERA.  Beni states he is feeling well overall, denies fevers, chills, nausea, vomiting, cough or shortness of breath.  Reports his blood sugar this morning was 117.  He also states that he went hunting last week, and was walking more than usual.  Discussed need for orthotic shoes and inserts once his wound is healed.  He was reluctant to accept this idea, states he hates diabetic shoes.  Explained to him that they are regular athletic shoes with custom inserts.  Prescription to [employer] provided in clinic today.  Also discussed initiating Biologic (ACell) to  this wound to accelerate healing.  He is amenable to this idea.  We will plan to initiate next clinic visit.    11/23/2021: Clinic visit with ELVIE Yarbrough.     11/30/2021 : Clinic visit with ELVIE Nichole, FNP-BC, CWOCN, CFCN.  He states he is feeling very well, reports his blood sugar this morning was 117.  Scant drainage from left toe amp site over the past week.  I was unable to probe any depth.  Patient is confident he can manage this wound on his own from here on out, I would [facility] to be discharged to [facility].  He understands he is to return to the clinic ASAP if wound reopens or if he develops new wounds.   He presents today wearing sandals to both feet, the wound to his right foot which he modified himself to accommodate TMA.  Discussed the importance of wearing protective footwear in order to prevent future ulceration/amputations.  Patient states he has a prescription for diabetic shoes and inserts, but has not yet started the process.  Time spent today reviewing importance of diabetic foot care.    REVIEW OF SYSTEMS:   Review of Systems   Constitutional: Negative for chills and fever.   HENT: Positive for hearing loss.    Respiratory: Negative for cough and shortness of breath.    Cardiovascular: Negative for chest pain, palpitations and leg swelling.   Gastrointestinal: Negative for constipation, diarrhea, nausea and vomiting.   Genitourinary: Negative for dysuria.   Skin: Negative for itching and rash.        Open wound to amputation site   Neurological:        Numbness in both feet and lower legs, radiating to about mid calf   Psychiatric/Behavioral: Negative for depression. The patient is not nervous/anxious.        PHYSICAL EXAMINATION:   BP (!) 169/101 (BP Location: Left arm) Comment: RN notified  Pulse 82   Temp 36.6 °C (97.9 °F)   Resp 18   SpO2 99%     Physical Exam  Constitutional:       Appearance: Normal appearance. He is normal weight.   HENT:      Head: Normocephalic.      Ears:       Comments: Hearing loss  Eyes:      Pupils: Pupils are equal, round, and reactive to light.   Cardiovascular:      Pulses: Normal pulses.      Comments: Pedal pulses difficult to palpate, multiphasic by Doppler  Pulmonary:      Effort: Pulmonary effort is normal.   Skin:     General: Skin is warm.      Comments: Dehiscence of surgical incision site  Refer to wound flowsheet and photos   Neurological:      Mental Status: He is alert and oriented to person, place, and time.      Comments: Both feet insensate   Psychiatric:         Mood and Affect: Mood normal.         Behavior: Behavior normal.         Thought Content: Thought content normal.         Judgment: Judgment normal.     Monofilament testing with a 10 gram force: sensation intact: decreased bilaterally  Visual Inspection: Feet with maceration, ulcers, fissures.  Pedal pulses: decreased bilaterally    WOUND ASSESSMENT                         PROCEDURE:    -Wound care completed by wound RN, refer to flowsheet   -Patient tolerated the procedure well, without c/o pain or discomfort.     ACell log:  First application 2021.  Cytal wound matrix 1 layer 3 x 3.5 cm lot #[id number] reference number [id number] serial number [id number]      Pertinent Labs and Diagnostics:    Labs:     A1c:   Lab Results   Component Value Date/Time    HBA1C 7.5 (H) 2021 07:16 PM          IMAGIN2021-three-view x-ray left foot  IMPRESSION:        Osseous destruction involving the left third mid and distal phalanges, in keeping with osteomyelitis.      VASCULAR STUDIES: No recent studies found in epic    LAST  WOUND CULTURE: 2021-OR cultures  Lab Results   Component Value Date/Time    CULTRSULT (A) 2021 02:07 PM     Growth noted after further incubation, see below for  organism identification.      CULTRSULT Staphylococcus simulans  Rare growth   (A) 2021 02:07 PM    CULTRSULT Streptococcus constellatus  Rare growth   (A) 2021 02:07 PM    CULTRSULT  No Anaerobes isolated. (A) 09/30/2021 02:07 PM    CULTRSULT Parvimonas micra  Rare growth   (A) 09/30/2021 02:07 PM    CULTRSULT - (A) 09/30/2021 02:07 PM             ASSESSMENT AND PLAN:     1. Dehiscence of operative wound, subsequent encounter  Comments: Amputation of left third toe due to toe amp on 9/30/2021.  Partial dehiscence near center of incision    11/30/2021: Wound essentially resolved, small pinpoint opening, with no appreciable drainage or depth.  Patient is trouble with managing on his own from here on out.    -Discharge from [facility]  -Patient to return to clinic ASAP if wound recurs, or if he/she develops new wounds    Wound care:  nonadhesive foam, Hypafix tape, Tubigrip D      2. Uncontrolled type 2 diabetes mellitus with hyperglycemia (HCC)  Comments: A1c checked during hospitalization, 7.5    11/30/2021: Patient states his blood sugar today was 117  -Patient encouraged to keep his blood sugars below 140 in order to optimize wound healing    3. Diabetic polyneuropathy associated with type 2 diabetes mellitus (HCC)  Comments: Monofilament testing in clinic on initial evaluation, both feet insensate.    11/30/2021:    - Implications of loss of protective sensation (LOPS) reviewed with patient- including increased risk for amputation.  Advised to check feet at least daily, moisturize feet, and to always wear protective foot wear.   -Patient encouraged to begin process for shoes and inserts ASAP.    4. History of transmetatarsal amputation of right foot (HCC)  Comments: Patient underwent right TMA in 2018, prior to that he had undergone amputations of toes.  He is at high risk for further amputations.            PATIENT EDUCATION  - Importance of tight glucose control for wound healing   - Implications of loss of protective sensation (LOPS) discussed with patient- including increased risk for amputation.  - Advised to check feet at least daily, moisturize feet, and to always wear protective foot wear.    -  Importance of offloading foot to assist with wound healing  - Advised pt not to trim nails or calluses, seek foot/nail care from podiatrist or certified foot/nail nurse  - Importance of adequate nutrition for wound healing    My total time spent caring for the patient on the day of the encounter was 20 minutes.   This does not include time spent on separately billable procedures/tests.    Please note that this note may have been created using voice recognition software. I have worked with technical experts from [email or web address] [facility] to optimize the interface.  I have made every reasonable attempt to correct obvious errors, but there may be errors of grammar and possibly content that I did not discover before finalizing the note.

## 2021-12-08 ENCOUNTER — OFFICE VISIT (OUTPATIENT)
Dept: MEDICAL GROUP | Facility: PHYSICIAN GROUP | Age: 51
End: 2021-12-08
Payer: MEDICARE

## 2021-12-08 VITALS
OXYGEN SATURATION: 97 % | DIASTOLIC BLOOD PRESSURE: 80 MMHG | HEART RATE: 84 BPM | HEIGHT: 72 IN | TEMPERATURE: 97.8 F | RESPIRATION RATE: 16 BRPM | SYSTOLIC BLOOD PRESSURE: 124 MMHG | BODY MASS INDEX: 30.34 KG/M2 | WEIGHT: 224 LBS

## 2021-12-08 DIAGNOSIS — E11.65 TYPE 2 DIABETES MELLITUS WITH HYPERGLYCEMIA, WITH LONG-TERM CURRENT USE OF INSULIN (HCC): ICD-10-CM

## 2021-12-08 DIAGNOSIS — Z79.4 TYPE 2 DIABETES MELLITUS WITH HYPERGLYCEMIA, WITH LONG-TERM CURRENT USE OF INSULIN (HCC): ICD-10-CM

## 2021-12-08 DIAGNOSIS — I10 HYPERTENSION, UNSPECIFIED TYPE: ICD-10-CM

## 2021-12-08 PROCEDURE — 99213 OFFICE O/P EST LOW 20 MIN: CPT | Performed by: FAMILY MEDICINE

## 2021-12-08 RX ORDER — INSULIN GLARGINE 100 [IU]/ML
INJECTION, SOLUTION SUBCUTANEOUS
COMMUNITY
Start: 2021-10-28 | End: 2021-12-08

## 2021-12-08 RX ORDER — GLIMEPIRIDE 4 MG/1
4 TABLET ORAL EVERY MORNING
COMMUNITY
Start: 2021-11-17 | End: 2022-05-13

## 2021-12-08 RX ORDER — MOXIFLOXACIN 5 MG/ML
SOLUTION/ DROPS OPHTHALMIC
COMMUNITY
Start: 2021-11-16 | End: 2022-01-03

## 2021-12-08 RX ORDER — DULAGLUTIDE 1.5 MG/.5ML
0.5 INJECTION, SOLUTION SUBCUTANEOUS
Status: ON HOLD | COMMUNITY
Start: 2021-11-03 | End: 2022-12-04

## 2021-12-08 RX ORDER — LISINOPRIL 20 MG/1
TABLET ORAL
Qty: 30 TABLET | Refills: 1 | Status: SHIPPED | OUTPATIENT
Start: 2021-12-08 | End: 2021-12-09 | Stop reason: SDUPTHER

## 2021-12-08 RX ORDER — PREDNISOLONE ACETATE 10 MG/ML
SUSPENSION/ DROPS OPHTHALMIC
COMMUNITY
Start: 2021-11-16 | End: 2022-01-03

## 2021-12-08 RX ORDER — LISINOPRIL 10 MG/1
TABLET ORAL
Qty: 30 TABLET | Refills: 1 | Status: SHIPPED | OUTPATIENT
Start: 2021-12-08 | End: 2021-12-09 | Stop reason: SDUPTHER

## 2021-12-08 ASSESSMENT — FIBROSIS 4 INDEX: FIB4 SCORE: 0.87

## 2021-12-08 NOTE — PROGRESS NOTES
Subjective:     CC:   Chief Complaint   Patient presents with   • Follow-Up       HPI:   Beni presents today with due to fact his blood pressure has been elevated patient states he had cataract surgery done and inform his blood pressure was high.  Patient did have some leftover lisinopril and he took 3 of the 10 mg he has been taking this for the last 2 weeks.  I did remind patient of his pharmacotherapy appointment in January.  Patient states he was seen at wound care and everything is healed and he is doing well.    Past Medical History:   Diagnosis Date   • Diabetes (HCC)    • Hx of heart artery stent 2019   • Hyperlipidemia    • Hypertension        Social History     Tobacco Use   • Smoking status: Never Smoker   • Smokeless tobacco: Never Used   Vaping Use   • Vaping Use: Never used   Substance Use Topics   • Alcohol use: No   • Drug use: Yes     Comment: CBD Gummies       Current Outpatient Medications Ordered in Epic   Medication Sig Dispense Refill   • glimepiride (AMARYL) 4 MG Tab      • moxifloxacin (VIGAMOX) 0.5 % Solution      • prednisoLONE acetate (PRED FORTE) 1 % Suspension      • TRULICITY 1.5 MG/0.5ML Solution Pen-injector      • insulin glargine (LANTUS) 100 UNIT/ML Solution 10 to 20 units depending on blood sugar in the [id number] 10 mL 2   • carvedilol (COREG) 6.25 MG Tab Take 1 Tablet by mouth 2 times a day. 180 Tablet 2   • gabapentin (NEURONTIN) 100 MG Cap Take 1 Capsule by mouth at bedtime. 90 Capsule 0   • Empagliflozin (JARDIANCE) 10 MG Tab Take 10 mg by mouth every day.     • aspirin 81 MG EC tablet Take 1 Tablet by mouth every day. 90 Tablet 3   • cholestyramine (QUESTRAN) 4 GM/DOSE powder Take 4 g by mouth 2 times a day. Mixed with 4-6 oz water.  Pt takes at 1000 and 1700.   Hold all other PO medications an hour before and 4 hours after dosing.     • atorvastatin (LIPITOR) 80 MG tablet Take 1 tablet by mouth every evening. 90 tablet 3   • omeprazole (PRILOSEC) 20 MG delayed-release capsule  Take 20 mg by mouth every morning.       No current [facility]-ordered facility-administered medications on file.       Allergies:  Patient has no known allergies.    Health Maintenance: Completed    ROS:  Gen: no fevers/chills  Eyes: no changes in vision  ENT: no sore throat, no hearing loss, no bloody nose  Pulm: no sob, no cough  CV: no chest pain, no palpitations  GI: no nausea/vomiting, no diarrhea  Skin: no rash  Neuro: no headaches, no numbness/tingling  Heme/Lymph: no easy bruising    Objective:     Exam:  /80   Pulse 84   Temp 36.6 °C (97.8 °F)   Resp 16   Ht 1.829 m (6')   Wt 102 kg (224 lb)   SpO2 97%   BMI 30.38 kg/m²  Body mass index is 30.38 kg/m².    Gen: Alert and oriented, No apparent distress.  Skin: Warm and dry.  No obvious lesions.  Eyes: Sclera wnl Pupils normal in size  ENT: Canals wnl and TM are not red  Lungs: Normal effort, CTA bilaterally, no wheezes, rhonchi, or rales  CV: Regular rate and rhythm. No murmurs, rubs, or gallops.  Musculoskeletal: Normal gait. No extremity cyanosis, clubbing, or edema.  Neuro: Oriented to person, place and time  Psych: Mood is wnl         Assessment & Plan:     51 y.o. male with the following -     1. Hypertension, unspecified type  I would recommend patient bring his home blood pressure cuff next time and we need to recheck his blood pressure in about 2 weeks he can come in as a MA visit.  I will go ahead and fax his prescription for lisinopril at 20 and along with lisinopril at 10 but we need to verify the correct dose.  Patient would later request us to use his mail order pharmacy patient to remind us of this.  This is a chronic problem  2. Type 2 diabetes mellitus with hyperglycemia, with long-term current use of insulin ([facility])  Patient reminded about his pharmacotherapy appointment January 3 this is a chronic problem         Return in about 2 weeks (around 12/22/2021).    Please note that this dictation was created using voice recognition  software. I have made every reasonable attempt to correct obvious errors, but I expect that there are errors of grammar and possibly content that I did not discover before finalizing the note.

## 2021-12-09 ENCOUNTER — PATIENT MESSAGE (OUTPATIENT)
Dept: MEDICAL GROUP | Facility: PHYSICIAN GROUP | Age: 51
End: 2021-12-09

## 2021-12-09 RX ORDER — LISINOPRIL 20 MG/1
TABLET ORAL
Qty: 30 TABLET | Refills: 1 | Status: SHIPPED | OUTPATIENT
Start: 2021-12-09 | End: 2021-12-09

## 2021-12-09 RX ORDER — LISINOPRIL 10 MG/1
TABLET ORAL
Qty: 30 TABLET | Refills: 1 | Status: SHIPPED | OUTPATIENT
Start: 2021-12-09 | End: 2021-12-09 | Stop reason: SDUPTHER

## 2021-12-09 RX ORDER — PEN NEEDLE, DIABETIC 30 GX3/16"
1 NEEDLE, DISPOSABLE MISCELLANEOUS DAILY
Qty: 90 EACH | Refills: 2 | Status: SHIPPED | OUTPATIENT
Start: 2021-12-09 | End: 2023-05-09

## 2022-01-03 ENCOUNTER — NON-PROVIDER VISIT (OUTPATIENT)
Dept: MEDICAL GROUP | Facility: PHYSICIAN GROUP | Age: 52
End: 2022-01-03
Payer: MEDICARE

## 2022-01-03 DIAGNOSIS — Z79.4 TYPE 2 DIABETES MELLITUS WITH HYPERGLYCEMIA, WITH LONG-TERM CURRENT USE OF INSULIN (HCC): ICD-10-CM

## 2022-01-03 DIAGNOSIS — E11.65 TYPE 2 DIABETES MELLITUS WITH HYPERGLYCEMIA, WITH LONG-TERM CURRENT USE OF INSULIN (HCC): ICD-10-CM

## 2022-01-03 LAB
HBA1C MFR BLD: 8.3 % (ref 0–5.6)
INT CON NEG: ABNORMAL
INT CON POS: ABNORMAL

## 2022-01-03 PROCEDURE — 83036 HEMOGLOBIN GLYCOSYLATED A1C: CPT | Performed by: FAMILY MEDICINE

## 2022-01-03 PROCEDURE — 99211 OFF/OP EST MAY X REQ PHY/QHP: CPT | Performed by: NURSE PRACTITIONER
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
## 2022-01-10 ENCOUNTER — DOCUMENTATION (OUTPATIENT)
Dept: VASCULAR LAB | Facility: MEDICAL CENTER | Age: 52
End: 2022-01-10

## 2022-01-10 NOTE — PROGRESS NOTES
[facility]    Called Beni and left voicemail notifying him that his patient assistance for Trulicity has been approved for the calendar year.     Frantz Luna, KristinD

## 2022-01-10 NOTE — PROGRESS NOTES
[employer] enrollment Notification. Pt meets eligibility for 12 months. Scanned into media. JESUS Haq.

## 2022-03-03 ENCOUNTER — TELEMEDICINE (OUTPATIENT)
Dept: MEDICAL GROUP | Facility: PHYSICIAN GROUP | Age: 52
End: 2022-03-03
Payer: MEDICARE

## 2022-03-03 VITALS — BODY MASS INDEX: 30.48 KG/M2 | WEIGHT: 225 LBS | HEIGHT: 72 IN

## 2022-03-03 DIAGNOSIS — R22.43 LOCALIZED SWELLING OF BOTH LOWER LEGS: ICD-10-CM

## 2022-03-03 DIAGNOSIS — E11.65 TYPE 2 DIABETES MELLITUS WITH HYPERGLYCEMIA, WITH LONG-TERM CURRENT USE OF INSULIN (HCC): ICD-10-CM

## 2022-03-03 DIAGNOSIS — M86.172 OTHER ACUTE OSTEOMYELITIS OF LEFT FOOT (HCC): ICD-10-CM

## 2022-03-03 DIAGNOSIS — Z79.4 TYPE 2 DIABETES MELLITUS WITH HYPERGLYCEMIA, WITH LONG-TERM CURRENT USE OF INSULIN (HCC): ICD-10-CM

## 2022-03-03 PROCEDURE — 99213 OFFICE O/P EST LOW 20 MIN: CPT | Mod: 95 | Performed by: FAMILY MEDICINE

## 2022-03-03 ASSESSMENT — FIBROSIS 4 INDEX: FIB4 SCORE: 0.87

## 2022-03-03 ASSESSMENT — PATIENT HEALTH QUESTIONNAIRE - PHQ9: CLINICAL INTERPRETATION OF PHQ2 SCORE: 0
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
## 2022-03-09 ENCOUNTER — HOSPITAL ENCOUNTER (OUTPATIENT)
Facility: MEDICAL CENTER | Age: 52
End: 2022-03-09
Attending: FAMILY MEDICINE
Payer: MEDICARE

## 2022-03-09 ENCOUNTER — HOSPITAL ENCOUNTER (OUTPATIENT)
Dept: LAB | Facility: MEDICAL CENTER | Age: 52
End: 2022-03-09
Attending: FAMILY MEDICINE
Payer: MEDICARE

## 2022-03-09 ENCOUNTER — APPOINTMENT (OUTPATIENT)
Dept: RADIOLOGY | Facility: IMAGING CENTER | Age: 52
End: 2022-03-09
Attending: FAMILY MEDICINE
Payer: MEDICARE

## 2022-03-09 ENCOUNTER — OFFICE VISIT (OUTPATIENT)
Dept: URGENT CARE | Facility: PHYSICIAN GROUP | Age: 52
End: 2022-03-09
Payer: MEDICARE

## 2022-03-09 VITALS
WEIGHT: 269 LBS | TEMPERATURE: 97.2 F | HEIGHT: 72 IN | OXYGEN SATURATION: 99 % | RESPIRATION RATE: 14 BRPM | HEART RATE: 84 BPM | BODY MASS INDEX: 36.44 KG/M2 | DIASTOLIC BLOOD PRESSURE: 90 MMHG | SYSTOLIC BLOOD PRESSURE: 168 MMHG

## 2022-03-09 DIAGNOSIS — R60.1 GENERALIZED EDEMA: ICD-10-CM

## 2022-03-09 DIAGNOSIS — R82.81 PYURIA: ICD-10-CM

## 2022-03-09 DIAGNOSIS — N28.9 RENAL INSUFFICIENCY: ICD-10-CM

## 2022-03-09 DIAGNOSIS — Z79.4 TYPE 2 DIABETES MELLITUS WITHOUT COMPLICATION, WITH LONG-TERM CURRENT USE OF INSULIN (HCC): ICD-10-CM

## 2022-03-09 DIAGNOSIS — I51.7 CARDIOMEGALY: ICD-10-CM

## 2022-03-09 DIAGNOSIS — J90 PLEURAL EFFUSION: ICD-10-CM

## 2022-03-09 DIAGNOSIS — R79.89 ELEVATED BRAIN NATRIURETIC PEPTIDE (BNP) LEVEL: ICD-10-CM

## 2022-03-09 DIAGNOSIS — E11.9 TYPE 2 DIABETES MELLITUS WITHOUT COMPLICATION, WITH LONG-TERM CURRENT USE OF INSULIN (HCC): ICD-10-CM

## 2022-03-09 LAB
ALBUMIN SERPL BCP-MCNC: 2.2 G/DL (ref 3.2–4.9)
ALBUMIN/GLOB SERPL: 1 G/DL
ALP SERPL-CCNC: 75 U/L (ref 30–99)
ALT SERPL-CCNC: 19 U/L (ref 2–50)
ANION GAP SERPL CALC-SCNC: 9 MMOL/L (ref 7–16)
APPEARANCE UR: CLEAR
AST SERPL-CCNC: 39 U/L (ref 12–45)
BASOPHILS # BLD AUTO: 0.9 % (ref 0–1.8)
BASOPHILS # BLD: 0.07 K/UL (ref 0–0.12)
BILIRUB SERPL-MCNC: 1.4 MG/DL (ref 0.1–1.5)
BILIRUB UR STRIP-MCNC: NORMAL MG/DL
BUN SERPL-MCNC: 19 MG/DL (ref 8–22)
CALCIUM SERPL-MCNC: 7.9 MG/DL (ref 8.5–10.5)
CHLORIDE SERPL-SCNC: 112 MMOL/L (ref 96–112)
CO2 SERPL-SCNC: 21 MMOL/L (ref 20–33)
COLOR UR AUTO: YELLOW
CREAT SERPL-MCNC: 1.57 MG/DL (ref 0.5–1.4)
EOSINOPHIL # BLD AUTO: 0.24 K/UL (ref 0–0.51)
EOSINOPHIL NFR BLD: 3 % (ref 0–6.9)
ERYTHROCYTE [DISTWIDTH] IN BLOOD BY AUTOMATED COUNT: 44 FL (ref 35.9–50)
GLOBULIN SER CALC-MCNC: 2.1 G/DL (ref 1.9–3.5)
GLUCOSE BLD-MCNC: 123 MG/DL (ref 70–100)
GLUCOSE SERPL-MCNC: 127 MG/DL (ref 65–99)
GLUCOSE UR STRIP.AUTO-MCNC: 250 MG/DL
HBA1C MFR BLD: 6.3 % (ref 0–5.6)
HCT VFR BLD AUTO: 34.4 % (ref 42–52)
HGB BLD-MCNC: 11.5 G/DL (ref 14–18)
IMM GRANULOCYTES # BLD AUTO: 0.03 K/UL (ref 0–0.11)
IMM GRANULOCYTES NFR BLD AUTO: 0.4 % (ref 0–0.9)
INT CON NEG: ABNORMAL
INT CON POS: ABNORMAL
KETONES UR STRIP.AUTO-MCNC: NORMAL MG/DL
LEUKOCYTE ESTERASE UR QL STRIP.AUTO: NORMAL
LYMPHOCYTES # BLD AUTO: 1.77 K/UL (ref 1–4.8)
LYMPHOCYTES NFR BLD: 22.3 % (ref 22–41)
MCH RBC QN AUTO: 30.2 PG (ref 27–33)
MCHC RBC AUTO-ENTMCNC: 33.4 G/DL (ref 33.7–35.3)
MCV RBC AUTO: 90.3 FL (ref 81.4–97.8)
MONOCYTES # BLD AUTO: 0.68 K/UL (ref 0–0.85)
MONOCYTES NFR BLD AUTO: 8.6 % (ref 0–13.4)
NEUTROPHILS # BLD AUTO: 5.14 K/UL (ref 1.82–7.42)
NEUTROPHILS NFR BLD: 64.8 % (ref 44–72)
NITRITE UR QL STRIP.AUTO: NORMAL
NRBC # BLD AUTO: 0 K/UL
NRBC BLD-RTO: 0 /100 WBC
NT-PROBNP SERPL IA-MCNC: 831 PG/ML (ref 0–125)
PH UR STRIP.AUTO: 5.5 [PH] (ref 5–8)
PLATELET # BLD AUTO: 228 K/UL (ref 164–446)
PMV BLD AUTO: 11.3 FL (ref 9–12.9)
POTASSIUM SERPL-SCNC: 4.5 MMOL/L (ref 3.6–5.5)
PROT SERPL-MCNC: 4.3 G/DL (ref 6–8.2)
PROT UR QL STRIP: 300 MG/DL
RBC # BLD AUTO: 3.81 M/UL (ref 4.7–6.1)
RBC UR QL AUTO: NORMAL
SODIUM SERPL-SCNC: 142 MMOL/L (ref 135–145)
SP GR UR STRIP.AUTO: 1.02
TSH SERPL DL<=0.005 MIU/L-ACNC: 4.78 UIU/ML (ref 0.38–5.33)
UROBILINOGEN UR STRIP-MCNC: NORMAL MG/DL
WBC # BLD AUTO: 7.9 K/UL (ref 4.8–10.8)

## 2022-03-09 PROCEDURE — 82962 GLUCOSE BLOOD TEST: CPT | Performed by: FAMILY MEDICINE

## 2022-03-09 PROCEDURE — 36415 COLL VENOUS BLD VENIPUNCTURE: CPT

## 2022-03-09 PROCEDURE — 85025 COMPLETE CBC W/AUTO DIFF WBC: CPT

## 2022-03-09 PROCEDURE — 84443 ASSAY THYROID STIM HORMONE: CPT

## 2022-03-09 PROCEDURE — 83036 HEMOGLOBIN GLYCOSYLATED A1C: CPT | Performed by: FAMILY MEDICINE

## 2022-03-09 PROCEDURE — 87086 URINE CULTURE/COLONY COUNT: CPT

## 2022-03-09 PROCEDURE — 99214 OFFICE O/P EST MOD 30 MIN: CPT | Performed by: FAMILY MEDICINE

## 2022-03-09 PROCEDURE — 83880 ASSAY OF NATRIURETIC PEPTIDE: CPT

## 2022-03-09 PROCEDURE — 71046 X-RAY EXAM CHEST 2 VIEWS: CPT | Mod: TC,FY | Performed by: RADIOLOGY

## 2022-03-09 PROCEDURE — 81002 URINALYSIS NONAUTO W/O SCOPE: CPT | Performed by: FAMILY MEDICINE

## 2022-03-09 PROCEDURE — 80053 COMPREHEN METABOLIC PANEL: CPT

## 2022-03-09 RX ORDER — CEFDINIR 300 MG/1
CAPSULE ORAL
Qty: 14 CAPSULE | Refills: 0 | Status: SHIPPED | OUTPATIENT
Start: 2022-03-09 | End: 2022-03-16

## 2022-03-09 RX ORDER — FUROSEMIDE 40 MG/1
40 TABLET ORAL DAILY
Qty: 7 TABLET | Refills: 0 | Status: SHIPPED | OUTPATIENT
Start: 2022-03-09 | End: 2022-03-11

## 2022-03-09 ASSESSMENT — FIBROSIS 4 INDEX: FIB4 SCORE: 0.87

## 2022-03-09 NOTE — PROGRESS NOTES
Chief Complaint:    Chief Complaint   Patient presents with   • Groin Swelling   • Water Retention     Gained 40lbs in 1 wk   • Testicle Pain     X2 weeks       History of Present Illness:    He has widespread swelling - face, arms, legs, with associated weight gain for many weeks. Has been communicating with his PCP about this - [employer] message on 2/8/22 and Telemed visit on 3/3/22, visit/communications reviewed. He denies shortness of breath. Denies previous swelling issues.    Reviewed ECHO ordered by other provider (8/19/21): Normal left ventricular systolic function. Left ventricular ejection fraction is visually estimated to be 65%. No significant valve abnormalities.       Past Medical History:    Past Medical History:   Diagnosis Date   • Diabetes (HCC)    • Hx of heart artery stent 2019   • Hyperlipidemia    • Hypertension      Past Surgical History:    Past Surgical History:   Procedure Laterality Date   • TOE AMPUTATION Left 9/30/2021    Procedure: AMPUTATION, TOE;  Surgeon: Tomer Hart M.D.;  Location: [facility];  Service: Orthopedics   • STENT PLACEMENT  2019    STEMI with nonobstructive LAD   • TOE AMPUTATION Right 7/2/2018    Procedure: Transmetatarsal amputation;  Surgeon: Ravi Bernardo M.D.;  Location: [facility];  Service: Orthopedics   • ACHILLES TENDON REPAIR Right 7/2/2018    Procedure: ACHILLES LENGTHENING;  Surgeon: Ravi Bernardo M.D.;  Location: [facility];  Service: Orthopedics   • IRRIGATION & DEBRIDEMENT ORTHO Right 7/2/2018    Procedure: IRRIGATION & DEBRIDEMENT ORTHO;  Surgeon: Ravi Bernardo M.D.;  Location: [facility];  Service: Orthopedics   • OTHER     • OTHER ABDOMINAL SURGERY     • OTHER ORTHOPEDIC SURGERY     • TONSILLECTOMY     • VASECTOMY       Social History:    Social History     Socioeconomic History   • Marital status:      Spouse name: Not on file   • Number of children: Not on file   • Years of education:  Not on file   • Highest education level: 12th grade   Occupational History   • Not on file   Tobacco Use   • Smoking status: Never Smoker   • Smokeless tobacco: Never Used   Vaping Use   • Vaping Use: Never used   Substance and Sexual Activity   • Alcohol use: No   • Drug use: Yes     Types: Marijuana     Comment: CBD Edibles   • Sexual activity: Not Currently     Partners: Female   Other Topics Concern   • Not on file   Social History Narrative   • Not on file     Social Determinants of Health     Financial Resource Strain: Low Risk    • Difficulty of Paying Living Expenses: Not hard at all   Food Insecurity: No Food Insecurity   • Worried About Running Out of Food in the Last Year: Never true   • Ran Out of Food in the Last Year: Never true   Transportation Needs: No Transportation Needs   • Lack of Transportation (Medical): No   • Lack of Transportation (Non-Medical): No   Physical Activity: Insufficiently Active   • Days of Exercise per Week: 3 days   • Minutes of Exercise per Session: 30 min   Stress: No Stress Concern Present   • Feeling of Stress : Not at all   Social Connections: Moderately Isolated   • Frequency of Communication with Friends and Family: More than three times a week   • Frequency of Social Gatherings with Friends and Family: More than three times a week   • Attends Mosque Services: Never   • Active Member of Clubs or Organizations: No   • Attends Club or Organization Meetings: Never   • Marital Status:    Intimate Partner Violence: Not on file   Housing Stability: Low Risk    • Unable to Pay for Housing in the Last Year: No   • Number of Places Lived in the Last Year: 2   • Unstable Housing in the Last Year: No     Family History:    Family History   Problem Relation Age of Onset   • No Known Problems Mother    • Diabetes Father    • Heart Disease Father    • Diabetes Maternal Grandmother    • Heart Disease Maternal Grandfather    • Lung Disease Paternal Grandmother    • Cancer  Paternal Grandmother    • Cancer Paternal Grandfather    • Lung Cancer Paternal Grandfather      Medications:    Current Outpatient Medications on File Prior to Visit   Medication Sig Dispense Refill   • Insulin Pen Needle (PEN NEEDLES) 31G X 5 MM Misc Inject 1 Application under the skin every day. 90 Each 2   • lisinopril (PRINIVIL) 10 MG Tab Patient to take 3 tablets each day 90 Tablet 2   • glimepiride (AMARYL) 4 MG Tab      • TRULICITY 1.5 MG/0.5ML Solution Pen-injector      • insulin glargine (LANTUS) 100 UNIT/ML Solution 10 to 20 units depending on blood sugar in the [id number] 10 mL 2   • carvedilol (COREG) 6.25 MG Tab Take 1 Tablet by mouth 2 times a day. 180 Tablet 2   • gabapentin (NEURONTIN) 100 MG Cap Take 1 Capsule by mouth at bedtime. 90 Capsule 0   • Empagliflozin (JARDIANCE) 10 MG Tab Take 10 mg by mouth every day.     • aspirin 81 MG EC tablet Take 1 Tablet by mouth every day. 90 Tablet 3   • cholestyramine (QUESTRAN) 4 GM/DOSE powder Take 4 g by mouth 2 times a day. Mixed with 4-6 oz water.  Pt takes at 1000 and 1700.   Hold all other PO medications an hour before and 4 hours after dosing.     • atorvastatin (LIPITOR) 80 MG tablet Take 1 tablet by mouth every evening. 90 tablet 3   • omeprazole (PRILOSEC) 20 MG delayed-release capsule Take 20 mg by mouth every morning.       No current facility-administered medications on file prior to visit.     Allergies:    No Known Allergies      Vitals:    Vitals:    03/09/22 1109   BP: (!) 168/90   Pulse: 84   Resp: 14   Temp: 36.2 °C (97.2 °F)   TempSrc: Temporal   SpO2: 99%   Weight: 122 kg (269 lb)   Height: 1.829 m (6')       Physical Exam:    Constitutional: Vital signs reviewed. Appears well-developed and well-nourished. No acute distress.   Eyes: Sclera white, conjunctivae clear.   ENT: External ears normal. Hearing normal.   Neck: Neck supple.   Cardiovascular: Regular rate and rhythm. No murmur. 2+ pitting edema legs bilaterally.  Pulmonary/Chest:  Respirations non-labored. Clear to auscultation bilaterally.  Musculoskeletal: Normal gait. Normal range of motion. No muscular atrophy or weakness.  Neurological: Alert and oriented to person, place, and time. Muscle tone normal. Coordination normal.   Skin: No rashes or lesions. Warm, dry, normal turgor.  Psychiatric: Normal mood and affect. Behavior is normal. Judgment and thought content normal.       Diagnostics:    Contains abnormal data POCT glucose  Order: [id number]   Status: Final result     Visible to patient: No (scheduled for 3/10/2022  9:47 AM)     Next appt: 04/04/2022 at 08:30 AM in [facility] ([email or web address])     Dx: Generalized edema; Type 2 diabetes me...     0 Result Notes    Component Ref Range & Units 11:47 AM   (3/9/22) 5 mo ago   (10/4/21) 5 mo ago   (10/4/21) 5 mo ago   (10/3/21) 5 mo ago   (10/3/21) 5 mo ago   (10/3/21) 5 mo ago   (10/3/21)   Glucose - Accu-Ck 70 - 100 mg/dL 123 Abnormal   196 High  R  186 High  R  200 High  R  215 High  R  183 High  R  208 High  R    Resulting Agency  [facility] M M M M              Specimen Collected: 03/09/22 11:47 AM Last Resulted: 03/09/22 11:47 AM           POCT Hemoglobin A1C  Order: [id number]   Status: Final result     Visible to patient: No (scheduled for 3/10/2022  9:59 AM)     Next appt: 04/04/2022 at 08:30 AM in [facility] ([email or web address])     Dx: Generalized edema; Type 2 diabetes me...     0 Result Notes     1  Topic    Component Ref Range & Units 11:59 AM   (3/9/22) 2 mo ago   (1/3/22) 5 mo ago   (9/30/21) 8 mo ago   (6/28/21) 1 yr ago   (7/14/20) 2 yr ago   (1/2/20) 2 yr ago   (9/22/19)   Glycohemoglobin 0.0 - 5.6 % 6.3 Abnormal   8.3 Abnormal   7.5 High  R, CM  7.8 High  R, CM  6.5 Abnormal   6.5 High  CM  6.6 High  CM    Internal Control Negative  Valid      Valid      Internal Control Positive  Valid      Valid      Resulting Agency  [facility] M M  M M              Specimen Collected: 03/09/22 11:59  AM Last Resulted: 03/09/22 11:59 AM           DX-CHEST-2 VIEWS  Order: [id number]   Status: Final result     Visible to patient: No (scheduled for 3/10/2022 10:08 AM)     Next appt: 04/04/2022 at 08:30 AM in [facility] ([email or web address] PHARMACIST)     Dx: Generalized edema     0 Result Notes    Details    Reading Physician Reading Date Result Priority   Dhruv Man M.D.  [phone] 3/9/2022 Urgent Care     Narrative & Impression     3/9/2022 11:47 AM     HISTORY/REASON FOR EXAM:  Widespread swelling for many weeks. Rule out fluid in lungs..     TECHNIQUE/EXAM DESCRIPTION AND NUMBER OF VIEWS:  Two views of the chest.     COMPARISON:  8/31/2021.     FINDINGS:  Cardiomediastinal contours are stable with mild cardiac silhouette enlargement.     No pulmonary consolidation is seen.     Slight right costophrenic sulcus blunting     IMPRESSION:     Mild cardiac silhouette enlargement without interstitial edema confirmed     Right costophrenic sulcus blunting suspicious for small pleural fluid     I personally reviewed the images. Rad report reviewed with him and copy of report to him.    POCT Urinalysis  Order: [id number]   Status: Edited Result - FINAL     Visible to patient: No (scheduled for 3/10/2022  2:52 PM)     Next appt: 04/04/2022 at 08:30 AM in [facility] Group ([email or web address] PHARMACIST)     Dx: Generalized edema     0 Result Notes     Ref Range & Units  4:37 PM   POC Color Negative yellow    POC Appearance Negative clear    POC Leukocyte Esterase Negative MOD VC    POC Nitrites Negative neg    POC Urobiligen Negative (0.2) mg/dL neg    POC Protein Negative mg/dL 300    POC Urine PH 5.0 - 8.0 5.5    POC Blood Negative large    POC Specific Gravity <1.005 - >1.030 1.025    POC Ketones Negative mg/dL neg    POC Bilirubin Negative mg/dL neg    POC Glucose Negative mg/dL 250    Resulting Agency  [facility]              Specimen Collected: 03/09/22  4:37 PM Last Resulted: 03/09/22  4:37 PM             Medical Decision  Makin. Generalized edema  - DX-CHEST-2 VIEWS; Future  - POCT glucose  - POCT Hemoglobin A1C  - Comp Metabolic Panel; Future  - CBC WITH DIFFERENTIAL; Future  - TSH WITH REFLEX TO FT4; Future  - proBrain Natriuretic Peptide, NT; Future  - POCT Urinalysis    2. Type 2 diabetes mellitus without complication, with long-term current use of insulin (HCC)  - POCT glucose  - POCT Hemoglobin A1C  - Comp Metabolic Panel; Future  - CBC WITH DIFFERENTIAL; Future  - TSH WITH REFLEX TO FT4; Future  - proBrain Natriuretic Peptide, NT; Future    3. Pyuria  - URINE CULTURE(NEW); Future  - cefdinir (OMNICEF) 300 MG Cap; 1 CAP BY MOUTH TWICE A DAY X 7 DAYS.  Dispense: 14 Capsule; Refill: 0      Discussed with him DDX, management options, and risks, benefits, and alternatives to treatment plan agreed upon.    He has widespread swelling - face, arms, legs, with associated weight gain for many weeks. Has been communicating with his PCP about this - [employer] message on 22 and Telemed visit on 3/3/22, visit/communications reviewed. He denies shortness of breath. Denies previous swelling issues.    Reviewed ECHO ordered by other provider (21): Normal left ventricular systolic function. Left ventricular ejection fraction is visually estimated to be 65%. No significant valve abnormalities.     2+ pitting edema legs bilaterally.    CXR: Mild cardiac silhouette enlargement without interstitial edema confirmed. Right costophrenic sulcus blunting suspicious for small pleural fluid.    Urine dipstick: he could not give urine sample. He reports he is urinating same frequency but probably less volume with each urination.    He brought back urine later in day for urine dipstick    Urine dipstick: Moderate leukocytes. Blood large, protein 300 mg/dl, glucose 250 mg/dl. He had moderate blood and protein 300 mg/dl on urine dipsticks 21, 21, and 21.    Fingerstick blood sugar: 123 mg/dl    POC Hgb A1C: 6.3    ? etiology of  widespread swelling.    Due to moderate leukocytes on urine dipstick, offered to treat for infection with antibiotic. He would like and Cefdinir prescribed. Will also send urine for culture. He denies dysuria.    Agreeable to blood tests ordered for further evaluation. Advised test results will show in MyChart.

## 2022-03-11 ENCOUNTER — OFFICE VISIT (OUTPATIENT)
Dept: NEPHROLOGY | Facility: MEDICAL CENTER | Age: 52
End: 2022-03-11
Payer: MEDICARE

## 2022-03-11 VITALS
WEIGHT: 255 LBS | OXYGEN SATURATION: 99 % | HEIGHT: 72 IN | DIASTOLIC BLOOD PRESSURE: 90 MMHG | HEART RATE: 86 BPM | TEMPERATURE: 98.4 F | RESPIRATION RATE: 20 BRPM | BODY MASS INDEX: 34.54 KG/M2 | SYSTOLIC BLOOD PRESSURE: 144 MMHG

## 2022-03-11 DIAGNOSIS — N17.9 AKI (ACUTE KIDNEY INJURY) (HCC): ICD-10-CM

## 2022-03-11 DIAGNOSIS — E11.65 TYPE 2 DIABETES MELLITUS WITH HYPERGLYCEMIA, WITH LONG-TERM CURRENT USE OF INSULIN (HCC): Chronic | ICD-10-CM

## 2022-03-11 DIAGNOSIS — Z79.4 TYPE 2 DIABETES MELLITUS WITH HYPERGLYCEMIA, WITH LONG-TERM CURRENT USE OF INSULIN (HCC): Chronic | ICD-10-CM

## 2022-03-11 DIAGNOSIS — R60.1 GENERALIZED EDEMA: ICD-10-CM

## 2022-03-11 DIAGNOSIS — R79.89 ELEVATED BRAIN NATRIURETIC PEPTIDE (BNP) LEVEL: ICD-10-CM

## 2022-03-11 DIAGNOSIS — D64.9 ANEMIA, UNSPECIFIED TYPE: ICD-10-CM

## 2022-03-11 DIAGNOSIS — I10 PRIMARY HYPERTENSION: Chronic | ICD-10-CM

## 2022-03-11 DIAGNOSIS — R80.9 PROTEINURIA, UNSPECIFIED TYPE: ICD-10-CM

## 2022-03-11 DIAGNOSIS — Z86.39 HISTORY OF VITAMIN D DEFICIENCY: ICD-10-CM

## 2022-03-11 PROCEDURE — 99204 OFFICE O/P NEW MOD 45 MIN: CPT | Performed by: INTERNAL MEDICINE

## 2022-03-11 RX ORDER — FUROSEMIDE 40 MG/1
40 TABLET ORAL 2 TIMES DAILY
Qty: 180 TABLET | Refills: 3 | Status: SHIPPED | OUTPATIENT
Start: 2022-03-11 | End: 2022-05-12 | Stop reason: SDUPTHER

## 2022-03-11 RX ORDER — CANAGLIFLOZIN 100 MG/1
1 TABLET, FILM COATED ORAL DAILY
Qty: 90 TABLET | Refills: 3 | Status: SHIPPED | OUTPATIENT
Start: 2022-03-11 | End: 2022-04-01

## 2022-03-11 ASSESSMENT — FIBROSIS 4 INDEX: FIB4 SCORE: 2.08

## 2022-03-11 ASSESSMENT — ENCOUNTER SYMPTOMS
ABDOMINAL PAIN: 0
SHORTNESS OF BREATH: 0
DIARRHEA: 1
FEVER: 0

## 2022-03-11 NOTE — PROGRESS NOTES
Chief Complaint   Patient presents with   • New Patient     Renal sufficiency        Requesting Provider: Tino Bowie M.D.  CC: I am so swollen    HPI:  Beni Moore is a 51 y.o. male with diabetes, hypertension, proteinuria who presents today to establish nephrology care.     Re: DM2, diagnosed 1998. Patient has had microalbuminuria since at least 2018. Patient has seen an eye doctor and does have retinopathy requiring Eyelea injections (Dr. Yosef Dumont) since 2020 and it has saved his vision. He has also had some foot amputations.     Re: HTN, diagnosed 2018. BP control over the years has been controlled, until mid-late 2021. He was just given lasix yesterday, and the 40mg dose made him urinate.     Re: AXEL/CKD, denies history of AXEL, kidney stone. He denies chronic use of NSAIDs. He denies LUTS. He feels like he gained 40-45lbs in the last 3 weeks. Feels like the swelling came on very quickly.  Complains of scrotal edema.    Re: CAD, he had MI in 9/2020 and was started on coreg.       Past Medical History:   Diagnosis Date   • Diabetes (HCC)    • Hx of heart artery stent 2019   • Hyperlipidemia    • Hypertension        Past Surgical History:   Procedure Laterality Date   • TOE AMPUTATION Left 9/30/2021    Procedure: AMPUTATION, TOE;  Surgeon: Tomer Hart M.D.;  Location: [facility];  Service: Orthopedics   • STENT PLACEMENT  2019    STEMI with nonobstructive LAD   • TOE AMPUTATION Right 7/2/2018    Procedure: Transmetatarsal amputation;  Surgeon: Ravi Bernardo M.D.;  Location: [facility];  Service: Orthopedics   • ACHILLES TENDON REPAIR Right 7/2/2018    Procedure: ACHILLES LENGTHENING;  Surgeon: Ravi Bernardo M.D.;  Location: [facility];  Service: Orthopedics   • IRRIGATION & DEBRIDEMENT ORTHO Right 7/2/2018    Procedure: IRRIGATION & DEBRIDEMENT ORTHO;  Surgeon: Ravi Bernardo M.D.;  Location: [facility];  Service: Orthopedics   • OTHER      • OTHER ABDOMINAL SURGERY     • OTHER ORTHOPEDIC SURGERY     • TONSILLECTOMY     • VASECTOMY          Outpatient Encounter Medications as of 3/11/2022   Medication Sig Dispense Refill   • furosemide (LASIX) 40 MG Tab Take 1 Tablet by mouth 2 times a day. 180 Tablet 3   • Canagliflozin (INVOKANA) 100 MG Tab Take 1 Tablet by mouth every day. 90 Tablet 3   • cefdinir (OMNICEF) 300 MG Cap 1 CAP BY MOUTH TWICE A DAY X 7 DAYS. 14 Capsule 0   • Insulin Pen Needle (PEN NEEDLES) 31G X 5 MM Misc Inject 1 Application under the skin every day. 90 Each 2   • lisinopril (PRINIVIL) 10 MG Tab Patient to take 3 tablets each day 90 Tablet 2   • glimepiride (AMARYL) 4 MG Tab      • TRULICITY 1.5 MG/0.5ML Solution Pen-injector      • insulin glargine (LANTUS) 100 UNIT/ML Solution 10 to 20 units depending on blood sugar in the [id number] 10 mL 2   • carvedilol (COREG) 6.25 MG Tab Take 1 Tablet by mouth 2 times a day. 180 Tablet 2   • gabapentin (NEURONTIN) 100 MG Cap Take 1 Capsule by mouth at bedtime. 90 Capsule 0   • aspirin 81 MG EC tablet Take 1 Tablet by mouth every day. 90 Tablet 3   • cholestyramine (QUESTRAN) 4 GM/DOSE powder Take 4 g by mouth 2 times a day. Mixed with 4-6 oz water.  Pt takes at 1000 and 1700.   Hold all other PO medications an hour before and 4 hours after dosing.     • atorvastatin (LIPITOR) 80 MG tablet Take 1 tablet by mouth every evening. 90 tablet 3   • omeprazole (PRILOSEC) 20 MG delayed-release capsule Take 20 mg by mouth every morning.     • [DISCONTINUED] furosemide (LASIX) 40 MG Tab Take 1 Tablet by mouth every day for 7 days. 7 Tablet 0   • [DISCONTINUED] Empagliflozin (JARDIANCE) 10 MG Tab Take 10 mg by mouth every day. (Patient not taking: Reported on 3/11/2022)       No facility-administered encounter medications on file as of 3/11/2022.        No Known Allergies    Social History     Socioeconomic History   • Marital status:      Spouse name: Not on file   • Number of children: Not on  file   • Years of education: Not on file   • Highest education level: 12th grade   Occupational History   • Not on file   Tobacco Use   • Smoking status: Former Smoker     Packs/day: 0.50     Years: 10.00     Pack years: 5.00     Types: Cigarettes     Quit date:      Years since quittin.2   • Smokeless tobacco: Never Used   Vaping Use   • Vaping Use: Never used   Substance and Sexual Activity   • Alcohol use: No   • Drug use: Yes     Types: Marijuana     Comment: CBD Edibles   • Sexual activity: Not Currently     Partners: Female   Other Topics Concern   • Not on file   Social History Narrative   • Not on file     Social Determinants of Health     Financial Resource Strain: Low Risk    • Difficulty of Paying Living Expenses: Not hard at all   Food Insecurity: No Food Insecurity   • Worried About Running Out of Food in the Last Year: Never true   • Ran Out of Food in the Last Year: Never true   Transportation Needs: No Transportation Needs   • Lack of Transportation (Medical): No   • Lack of Transportation (Non-Medical): No   Physical Activity: Insufficiently Active   • Days of Exercise per Week: 3 days   • Minutes of Exercise per Session: 30 min   Stress: No Stress Concern Present   • Feeling of Stress : Not at all   Social Connections: Moderately Isolated   • Frequency of Communication with Friends and Family: More than three times a week   • Frequency of Social Gatherings with Friends and Family: More than three times a week   • Attends Roman [employer] Services: Never   • Active Member of Clubs or Organizations: No   • Attends Club or Organization Meetings: Never   • Marital Status:    Intimate Partner Violence: Not on file   Housing Stability: Low Risk    • Unable to Pay for Housing in the Last Year: No   • Number of Places Lived in the Last Year: 2   • Unstable Housing in the Last Year: No       Family History   Problem Relation Age of Onset   • No Known Problems Mother    • Diabetes Father    • Heart  Disease Father    • Diabetes Maternal Grandmother    • Heart Disease Maternal Grandfather    • Lung Disease Paternal Grandmother    • Cancer Paternal Grandmother    • Cancer Paternal Grandfather    • Lung Cancer Paternal Grandfather    • Kidney Disease Neg Hx        Review of Systems   Constitutional: Negative for fever.   Respiratory: Negative for shortness of breath.    Cardiovascular: Positive for leg swelling. Negative for chest pain.   Gastrointestinal: Positive for diarrhea. Negative for abdominal pain.   Genitourinary: Negative for dysuria.   All other systems reviewed and are negative.      /90 (BP Location: Right arm, Patient Position: Sitting)   Pulse 86   Temp 36.9 °C (98.4 °F) (Temporal)   Resp 20   Ht 1.829 m (6')   Wt 116 kg (255 lb)   SpO2 99%   BMI 34.58 kg/m²     Physical Exam  Constitutional:       General: He is not in acute distress.  HENT:      Mouth/Throat:      Pharynx: No oropharyngeal exudate.   Eyes:      General: No scleral icterus.  Neck:      Trachea: No tracheal deviation.   Cardiovascular:      Rate and Rhythm: Normal rate and regular rhythm.      Heart sounds: Normal heart sounds. No murmur heard.  Pulmonary:      Effort: Pulmonary effort is normal.      Breath sounds: Normal breath sounds. No stridor. No rales.   Abdominal:      General: Bowel sounds are normal.      Palpations: Abdomen is soft.      Tenderness: There is no abdominal tenderness.   Musculoskeletal:         General: Deformity (R foot TMA noted, left foot 3rd toe amp noted) present. Normal range of motion.      Cervical back: Neck supple.      Right lower leg: Edema (3-4+) present.      Left lower leg: Edema (3-4+) present.   Skin:     General: Skin is warm and dry.      Findings: No rash.   Neurological:      General: No focal deficit present.      Mental Status: He is alert and oriented to person, place, and time.   Psychiatric:         Mood and Affect: Mood and affect normal.         Behavior: Behavior  normal.           Labs reviewed.    Recent Labs     06/02/21  0613 06/09/21 2031 09/13/21  0735 09/30/21  0458 10/01/21  0329 10/03/21  0040 10/04/21  0329 03/09/22  1228   ALBUMIN 3.4   < > 3.0* 3.6  --   --   --  2.2*   HDL 35*  --   --   --   --   --   --   --    TRIGLYCERIDE 88  --   --   --   --   --   --   --    SODIUM 138   < > 138 135   < > 134* 138 142   POTASSIUM 4.6   < > 4.1 3.9   < > 3.5* 3.9 4.5   CHLORIDE 109   < > 110 102   < > 103 106 112   CO2 22   < > 18* 21   < > 23 23 21   BUN 19   < > 15 15   < > 10 9 19   CREATININE 1.44*   < > 0.88 1.15   < > 0.93 0.80 1.57*   PHOSPHORUS  --   --   --  3.0  --   --  2.6  --     < > = values in this interval not displayed.       Lab Results   Component Value Date/Time    WBC 7.9 03/09/2022 12:28 PM    RBC 3.81 (L) 03/09/2022 12:28 PM    HEMOGLOBIN 11.5 (L) 03/09/2022 12:28 PM    HEMATOCRIT 34.4 (L) 03/09/2022 12:28 PM    MCV 90.3 03/09/2022 12:28 PM    MCH 30.2 03/09/2022 12:28 PM    MCHC 33.4 (L) 03/09/2022 12:28 PM    MPV 11.3 03/09/2022 12:28 PM       Recent Labs     03/09/22  1228   WBC 7.9   RBC 3.81*   HEMOGLOBIN 11.5*   HEMATOCRIT 34.4*   MCV 90.3   MCH 30.2   MCHC 33.4*   RDW 44.0   PLATELETCT 228   MPV 11.3     URINALYSIS:  Lab Results   Component Value Date/Time    COLORURINE Yellow 09/30/2021 2244    CLARITY Clear 09/30/2021 2244    SPECGRAVITY 1.022 09/30/2021 2244    PHURINE 5.0 09/30/2021 2244    KETONES 15 (A) 09/30/2021 2244    PROTEINURIN 300 (A) 09/30/2021 2244    BILIRUBINUR Negative 09/30/2021 2244    UROBILU 0.2 09/30/2021 2244    NITRITE Negative 09/30/2021 2244    LEUKESTERAS Negative 09/30/2021 2244    OCCULTBLOOD Moderate (A) 09/30/2021 2244     UPC  No results found for: TOTPROTUR No results found for: CREATININEU      Imaging reviewed  No orders to display         Assessment:  Beni Moore is a 51 y.o. male with diabetes, hypertension, proteinuria who presents today to establish nephrology care.     Plan:  1. AXEL versus CKD.     -It is unclear if this patient has AXEL or progression of CKD.  The patient invariably has diabetic nephropathy given the presence of diabetic retinopathy.  However, the time course of sudden onset lower extremity edema and weight gain is concerning for an additional process such as drug-induced thrombotic microangiopathy from Eylea injections or a separate process such as minimal-change disease.  Quantify proteinuria.  I recommend serologic work-up for glomerulonephritis and kidney biopsy.  Recommend maximum medical therapy with ACE inhibitor and SGLT2 inhibitor.  Avoid NSAIDs and other nephrotoxins.  Low-salt diet.    2. Type 2 diabetes mellitus with hyperglycemia, with long-term current use of insulin (HCC)  -Well-controlled, but has a history of poor control.  Patient did not tolerate Jardiance due to diarrhea.  I recommend trial of Invokana or Farxiga.  Continue ACE inhibitor.  Defer remainder of diabetes management to primary care.  - There is no need to dose adjust metformin if GFR is above 45.  If GFR falls to between 30 and 45, maximum dose of metformin is 1000 mg daily.  If GFR falls to less than 30, metformin must be discontinued.     3. Primary hypertension  -Uncontrolled, most likely due to volume overload.  Increase Lasix to 40 mg p.o. twice daily.  Continue lisinopril 10 mg p.o. daily for now until patient is more euvolemic, but would ideally titrate lisinopril up to maximum tolerated dose, up to 40 mg p.o. daily if tolerated.        Return to clinic 6 to 8 weeks with preclinic labs    Varun Ortiz MD  Nephrology

## 2022-03-11 NOTE — PATIENT INSTRUCTIONS
"If you would like to learn more about kidney failure and the options for patients with kidney failure, I recommend viewing a YouTube video by Dr. Costa Aragon about dialysis options.  You can find this easily by searching Google for \"YouTube, Dr. Costa Aragon, dialysis.\"  The video is about 10 minutes long.   "

## 2022-03-12 LAB
BACTERIA UR CULT: ABNORMAL
BACTERIA UR CULT: ABNORMAL
SIGNIFICANT IND 70042: ABNORMAL
SITE SITE: ABNORMAL
SOURCE SOURCE: ABNORMAL

## 2022-03-15 ENCOUNTER — PRE-ADMISSION TESTING (OUTPATIENT)
Dept: ADMISSIONS | Facility: MEDICAL CENTER | Age: 52
End: 2022-03-15
Attending: INTERNAL MEDICINE
Payer: MEDICARE

## 2022-03-22 ENCOUNTER — HOSPITAL ENCOUNTER (OUTPATIENT)
Dept: LAB | Facility: MEDICAL CENTER | Age: 52
End: 2022-03-22
Attending: INTERNAL MEDICINE
Payer: MEDICARE

## 2022-03-22 DIAGNOSIS — N17.9 AKI (ACUTE KIDNEY INJURY) (HCC): ICD-10-CM

## 2022-03-22 DIAGNOSIS — R60.1 GENERALIZED EDEMA: ICD-10-CM

## 2022-03-22 DIAGNOSIS — R80.9 PROTEINURIA, UNSPECIFIED TYPE: ICD-10-CM

## 2022-03-22 LAB
ALBUMIN SERPL BCP-MCNC: 2.2 G/DL (ref 3.2–4.9)
C3 SERPL-MCNC: 124.5 MG/DL (ref 87–200)
C4 SERPL-MCNC: 15.3 MG/DL (ref 19–52)
INR PPP: 1.15 (ref 0.87–1.13)
PROTHROMBIN TIME: 14.4 SEC (ref 12–14.6)
T PALLIDUM AB SER QL IA: NORMAL

## 2022-03-22 PROCEDURE — 85610 PROTHROMBIN TIME: CPT

## 2022-03-22 PROCEDURE — 82040 ASSAY OF SERUM ALBUMIN: CPT

## 2022-03-22 PROCEDURE — 86780 TREPONEMA PALLIDUM: CPT | Mod: GZ

## 2022-03-22 PROCEDURE — 86038 ANTINUCLEAR ANTIBODIES: CPT

## 2022-03-22 PROCEDURE — 86160 COMPLEMENT ANTIGEN: CPT

## 2022-03-22 PROCEDURE — 84155 ASSAY OF PROTEIN SERUM: CPT

## 2022-03-22 PROCEDURE — 83521 IG LIGHT CHAINS FREE EACH: CPT

## 2022-03-22 PROCEDURE — 84165 PROTEIN E-PHORESIS SERUM: CPT

## 2022-03-22 PROCEDURE — 83516 IMMUNOASSAY NONANTIBODY: CPT

## 2022-03-22 PROCEDURE — 86225 DNA ANTIBODY NATIVE: CPT

## 2022-03-22 PROCEDURE — 86255 FLUORESCENT ANTIBODY SCREEN: CPT

## 2022-03-22 PROCEDURE — 36415 COLL VENOUS BLD VENIPUNCTURE: CPT | Mod: GZ

## 2022-03-23 ENCOUNTER — HOSPITAL ENCOUNTER (OUTPATIENT)
Facility: MEDICAL CENTER | Age: 52
End: 2022-03-23
Attending: INTERNAL MEDICINE
Payer: MEDICARE

## 2022-03-23 LAB
APPEARANCE UR: CLEAR
BACTERIA #/AREA URNS HPF: NEGATIVE /HPF
BILIRUB UR QL STRIP.AUTO: NEGATIVE
COLOR UR: YELLOW
CREAT UR-MCNC: 76.74 MG/DL
CREAT UR-MCNC: 76.84 MG/DL
EPI CELLS #/AREA URNS HPF: ABNORMAL /HPF
GLUCOSE UR STRIP.AUTO-MCNC: 100 MG/DL
GRAN CASTS #/AREA URNS LPF: ABNORMAL /LPF
HYALINE CASTS #/AREA URNS LPF: ABNORMAL /LPF
KETONES UR STRIP.AUTO-MCNC: NEGATIVE MG/DL
LEUKOCYTE ESTERASE UR QL STRIP.AUTO: NEGATIVE
MICRO URNS: ABNORMAL
MICROALBUMIN UR-MCNC: 397.4 MG/DL
MICROALBUMIN/CREAT UR: 5179 MG/G (ref 0–30)
NITRITE UR QL STRIP.AUTO: NEGATIVE
PH UR STRIP.AUTO: 6 [PH] (ref 5–8)
PROT UR QL STRIP: 300 MG/DL
PROT UR-MCNC: >600 MG/DL (ref 0–15)
PROT/CREAT UR: ABNORMAL MG/G (ref 15–68)
RBC # URNS HPF: ABNORMAL /HPF
RBC UR QL AUTO: ABNORMAL
RENAL EPI CELLS #/AREA URNS HPF: ABNORMAL /HPF
SP GR UR STRIP.AUTO: 1.01
UROBILINOGEN UR STRIP.AUTO-MCNC: 0.2 MG/DL
WBC #/AREA URNS HPF: ABNORMAL /HPF

## 2022-03-23 PROCEDURE — 84156 ASSAY OF PROTEIN URINE: CPT

## 2022-03-23 PROCEDURE — 82570 ASSAY OF URINE CREATININE: CPT | Mod: 91

## 2022-03-23 PROCEDURE — 81001 URINALYSIS AUTO W/SCOPE: CPT

## 2022-03-23 PROCEDURE — 82043 UR ALBUMIN QUANTITATIVE: CPT

## 2022-03-24 LAB
BM IGG SER QL IF: NEGATIVE
DSDNA AB TITR SER CLIF: 4 IU (ref 0–24)
KAPPA LC FREE SER-MCNC: 63.63 MG/L (ref 3.3–19.4)
KAPPA LC FREE/LAMBDA FREE SER NEPH: 1.35 {RATIO} (ref 0.26–1.65)
LAMBDA LC FREE SERPL-MCNC: 47.13 MG/L (ref 5.71–26.3)
NUCLEAR IGG SER QL IA: NORMAL

## 2022-03-25 LAB
ALBUMIN SERPL ELPH-MCNC: 1.77 G/DL (ref 3.75–5.01)
ALPHA1 GLOB SERPL ELPH-MCNC: 0.36 G/DL (ref 0.19–0.46)
ALPHA2 GLOB SERPL ELPH-MCNC: 1.04 G/DL (ref 0.48–1.05)
B-GLOBULIN SERPL ELPH-MCNC: 0.64 G/DL (ref 0.48–1.1)
EER PROT ELECT SER Q1092: ABNORMAL
GAMMA GLOB SERPL ELPH-MCNC: 0.49 G/DL (ref 0.62–1.51)
INTERPRETATION SERPL IFE-IMP: ABNORMAL
MYELOPEROXIDASE AB SER-ACNC: 0 AU/ML (ref 0–19)
PLA2R IGG SERPL QL IF: NORMAL
PROT SERPL-MCNC: 4.3 G/DL (ref 6.3–8.2)
PROTEINASE3 AB SER-ACNC: 1 AU/ML (ref 0–19)

## 2022-03-28 ENCOUNTER — HOSPITAL ENCOUNTER (OUTPATIENT)
Facility: MEDICAL CENTER | Age: 52
End: 2022-03-28
Attending: INTERNAL MEDICINE | Admitting: RADIOLOGY
Payer: MEDICARE

## 2022-03-28 ENCOUNTER — APPOINTMENT (OUTPATIENT)
Dept: RADIOLOGY | Facility: MEDICAL CENTER | Age: 52
End: 2022-03-28
Attending: INTERNAL MEDICINE
Payer: MEDICARE

## 2022-03-28 VITALS
WEIGHT: 258.38 LBS | BODY MASS INDEX: 35 KG/M2 | HEART RATE: 87 BPM | RESPIRATION RATE: 17 BRPM | DIASTOLIC BLOOD PRESSURE: 89 MMHG | OXYGEN SATURATION: 97 % | TEMPERATURE: 98.2 F | SYSTOLIC BLOOD PRESSURE: 148 MMHG | HEIGHT: 72 IN

## 2022-03-28 DIAGNOSIS — R60.1 GENERALIZED EDEMA: ICD-10-CM

## 2022-03-28 DIAGNOSIS — N17.9 AKI (ACUTE KIDNEY INJURY) (HCC): ICD-10-CM

## 2022-03-28 DIAGNOSIS — R80.9 PROTEINURIA, UNSPECIFIED TYPE: ICD-10-CM

## 2022-03-28 LAB
EXTERNAL QUALITY CONTROL: NORMAL
GLUCOSE BLD STRIP.AUTO-MCNC: 47 MG/DL (ref 65–99)
GLUCOSE BLD STRIP.AUTO-MCNC: 73 MG/DL (ref 65–99)
GLUCOSE BLD STRIP.AUTO-MCNC: 84 MG/DL (ref 65–99)
PATHOLOGY CONSULT NOTE: NORMAL
SARS-COV+SARS-COV-2 AG RESP QL IA.RAPID: NEGATIVE

## 2022-03-28 PROCEDURE — 87426 SARSCOV CORONAVIRUS AG IA: CPT | Performed by: RADIOLOGY

## 2022-03-28 PROCEDURE — 160002 HCHG RECOVERY MINUTES (STAT)

## 2022-03-28 PROCEDURE — 88313 SPECIAL STAINS GROUP 2: CPT | Mod: 91

## 2022-03-28 PROCEDURE — 82962 GLUCOSE BLOOD TEST: CPT | Mod: 91

## 2022-03-28 PROCEDURE — 99152 MOD SED SAME PHYS/QHP 5/>YRS: CPT

## 2022-03-28 PROCEDURE — 88305 TISSUE EXAM BY PATHOLOGIST: CPT

## 2022-03-28 PROCEDURE — 160035 HCHG PACU - 1ST 60 MINS PHASE I

## 2022-03-28 PROCEDURE — 700111 HCHG RX REV CODE 636 W/ 250 OVERRIDE (IP)

## 2022-03-28 PROCEDURE — 88346 IMFLUOR 1ST 1ANTB STAIN PX: CPT

## 2022-03-28 PROCEDURE — 160047 HCHG PACU  - EA ADDL 30 MINS PHASE II

## 2022-03-28 PROCEDURE — 88348 ELECTRON MICROSCOPY DX: CPT

## 2022-03-28 PROCEDURE — 160046 HCHG PACU - 1ST 60 MINS PHASE II

## 2022-03-28 PROCEDURE — 88350 IMFLUOR EA ADDL 1ANTB STN PX: CPT | Mod: 91

## 2022-03-28 PROCEDURE — 700111 HCHG RX REV CODE 636 W/ 250 OVERRIDE (IP): Performed by: RADIOLOGY

## 2022-03-28 PROCEDURE — 88300 SURGICAL PATH GROSS: CPT

## 2022-03-28 PROCEDURE — 36415 COLL VENOUS BLD VENIPUNCTURE: CPT

## 2022-03-28 RX ORDER — SODIUM CHLORIDE 9 MG/ML
1000 INJECTION, SOLUTION INTRAVENOUS
Status: DISCONTINUED | OUTPATIENT
Start: 2022-03-28 | End: 2022-03-28 | Stop reason: HOSPADM

## 2022-03-28 RX ORDER — MIDAZOLAM HYDROCHLORIDE 1 MG/ML
.5-2 INJECTION INTRAMUSCULAR; INTRAVENOUS PRN
Status: ACTIVE | OUTPATIENT
Start: 2022-03-28 | End: 2022-03-28

## 2022-03-28 RX ORDER — HYDROMORPHONE HYDROCHLORIDE 1 MG/ML
0.5 INJECTION, SOLUTION INTRAMUSCULAR; INTRAVENOUS; SUBCUTANEOUS
Status: DISCONTINUED | OUTPATIENT
Start: 2022-03-28 | End: 2022-03-28 | Stop reason: HOSPADM

## 2022-03-28 RX ORDER — ONDANSETRON 2 MG/ML
4 INJECTION INTRAMUSCULAR; INTRAVENOUS PRN
Status: ACTIVE | OUTPATIENT
Start: 2022-03-28 | End: 2022-03-28

## 2022-03-28 RX ORDER — MIDAZOLAM HYDROCHLORIDE 1 MG/ML
INJECTION INTRAMUSCULAR; INTRAVENOUS
Status: COMPLETED
Start: 2022-03-28 | End: 2022-03-28

## 2022-03-28 RX ORDER — SODIUM CHLORIDE 9 MG/ML
500 INJECTION, SOLUTION INTRAVENOUS
Status: ACTIVE | OUTPATIENT
Start: 2022-03-28 | End: 2022-03-28

## 2022-03-28 RX ORDER — OXYCODONE HYDROCHLORIDE 5 MG/1
5 TABLET ORAL
Status: DISCONTINUED | OUTPATIENT
Start: 2022-03-28 | End: 2022-03-28 | Stop reason: HOSPADM

## 2022-03-28 RX ORDER — CEFDINIR 300 MG/1
300 CAPSULE ORAL 2 TIMES DAILY
COMMUNITY
End: 2022-04-06

## 2022-03-28 RX ADMIN — FENTANYL CITRATE 50 MCG: 50 INJECTION, SOLUTION INTRAMUSCULAR; INTRAVENOUS at 12:55

## 2022-03-28 RX ADMIN — MIDAZOLAM HYDROCHLORIDE 1 MG: 1 INJECTION, SOLUTION INTRAMUSCULAR; INTRAVENOUS at 13:09

## 2022-03-28 RX ADMIN — MIDAZOLAM HYDROCHLORIDE 1 MG: 1 INJECTION, SOLUTION INTRAMUSCULAR; INTRAVENOUS at 12:55

## 2022-03-28 RX ADMIN — FENTANYL CITRATE 50 MCG: 50 INJECTION, SOLUTION INTRAMUSCULAR; INTRAVENOUS at 13:09

## 2022-03-28 ASSESSMENT — FIBROSIS 4 INDEX: FIB4 SCORE: 2.08

## 2022-03-28 NOTE — Clinical Note
BP elevated in pre-op to 190s systolic. Pt remains hypertensive in CT. Dr. Lopez notified and will proceed w/ renal biopsy per Dr. Lopez.

## 2022-03-28 NOTE — OR SURGEON
Immediate Post- Operative Note        Findings: CKD      Procedure(s): LEFT kidney biopsy      Estimated Blood Loss: Less than 5 ml        Complications: None            3/28/2022     1:19 PM     Arnulfo Lopez M.D.

## 2022-03-28 NOTE — PROGRESS NOTES
IR/CT NOTE: non-targeted renal biopsy by Dr. Lopez under moderate sedation. Pt tolerated well, VSS, left flank drsg CDI. Histology present to collect samples.  Post procedure recovery orders to be placed by Dr. Lopez and released by PACU RN.

## 2022-03-28 NOTE — DISCHARGE INSTRUCTIONS
ACTIVITY: Rest and take it easy for the first 24 hours.  A responsible adult is recommended to remain with you during that time.  It is normal to feel sleepy.  We encourage you to not do anything that requires balance, judgment or coordination.    MILD FLU-LIKE SYMPTOMS ARE NORMAL. YOU MAY EXPERIENCE GENERALIZED MUSCLE ACHES, THROAT IRRITATION, HEADACHE AND/OR SOME NAUSEA.    FOR 24 HOURS DO NOT:  Drive, operate machinery or run household appliances.  Drink beer or alcoholic beverages.   Make important decisions or sign legal documents.    SPECIAL INSTRUCTIONS:   Percutaneous Kidney Biopsy, Care After  This sheet gives you information about how to care for yourself after your procedure. Your health care provider may also give you more specific instructions. If you have problems or questions, contact your health care provider.  What can I expect after the procedure?  After the procedure, it is common to have:  · Pain or soreness near the area where the needle went through your skin (biopsy site).  · Bright pink or cloudy urine for 24 hours after the procedure.  Follow these instructions at home:  Activity  · Return to your normal activities as told by your health care provider. Ask your health care provider what activities are safe for you.  · Do not drive for 24 hours if you were given a medicine to help you relax (sedative).  · Do not lift anything that is heavier than 10 lb (4.5 kg) until your health care provider tells you that it is safe.  · Avoid activities that take a lot of effort (are strenuous) until your health care provider approves. Most people will have to wait 2 weeks before returning to activities such as exercise or sexual intercourse.  General instructions  · Take over-the-counter and prescription medicines only as told by your health care provider.  · You may eat and drink after your procedure. Follow instructions from your health care provider about eating or drinking restrictions.  · Check your  biopsy site every day for signs of infection. Check for:  ? More redness, swelling, or pain.  ? More fluid or blood.  ? Warmth.  ? Pus or a bad smell.  · Keep all follow-up visits as told by your health care provider. This is important.  Contact a health care provider if:  · You have more redness, swelling, or pain around your biopsy site.  · You have more fluid or blood coming from your biopsy site.  · Your biopsy site feels warm to the touch.  · You have pus or a bad smell coming from your biopsy site.  · You have blood in your urine more than 24 hours after your procedure.  Get help right away if:  · You have dark red or brown urine.  · You have a fever.  · You are unable to urinate.  · You feel burning when you urinate.  · You feel faint.  · You have severe pain in your abdomen or side.  This information is not intended to replace advice given to you by your health care provider. Make sure you discuss any questions you have with your health care provider.  Document Released: 08/20/2014 Document Revised: 11/30/2018 Document Reviewed: 09/29/2017  [employer] Patient Education © 2020 [employer]    DIET: To avoid nausea, slowly advance diet as tolerated, avoiding spicy or greasy foods for the first day.  Add more substantial food to your diet according to your physician's instructions.  Babies can be fed formula or breast milk as soon as they are hungry.  INCREASE FLUIDS AND FIBER TO AVOID CONSTIPATION.    SURGICAL DRESSING/BATHING: In 24 hours you may remove your dressing and shower. Avoid submerging in water for 1 week.    FOLLOW-UP APPOINTMENT:  A follow-up appointment should be arranged with your doctor in 1-2 Weeks; call to schedule.    You should CALL YOUR PHYSICIAN if you develop:  Fever greater than 101 degrees F.  Pain not relieved by medication, or persistent nausea or vomiting.  Excessive bleeding (blood soaking through dressing) or unexpected drainage from the wound.  Extreme redness or swelling around  the incision site, drainage of pus or foul smelling drainage.  Inability to urinate or empty your bladder within 8 hours.  Problems with breathing or chest pain.    You should call 911 if you develop problems with breathing or chest pain.  If you are unable to contact your doctor or surgical center, you should go to the nearest emergency room or urgent care center.      Physician's telephone #: [phone] Dr. Lopez    If any questions arise, call your doctor.  If your doctor is not available, please feel free to call the Surgical Center at [phone].     A registered nurse may call you a few days after your surgery to see how you are doing after your procedure.    MEDICATIONS: Resume taking daily medication.  Take prescribed pain medication with food.  If no medication is prescribed, you may take non-aspirin pain medication if needed.  PAIN MEDICATION CAN BE VERY CONSTIPATING.  Take a stool softener or laxative such as senokot, pericolace, or milk of magnesia if needed.    If your physician has prescribed pain medication that includes Acetaminophen (Tylenol), do not take additional Acetaminophen (Tylenol) while taking the prescribed medication.    Depression / Suicide Risk    As you are discharged from this [facility] facility, it is important to learn how to keep safe from harming yourself.    Recognize the warning signs:  · Abrupt changes in personality, positive or negative- including increase in energy   · Giving away possessions  · Change in eating patterns- significant weight changes-  positive or negative  · Change in sleeping patterns- unable to sleep or sleeping all the time   · Unwillingness or inability to communicate  · Depression  · Unusual sadness, discouragement and loneliness  · Talk of wanting to die  · Neglect of personal appearance   · Rebelliousness- reckless behavior  · Withdrawal from people/activities they love  · Confusion- inability to concentrate     If you or a loved one observes  any of these behaviors or has concerns about self-harm, here's what you can do:  · Talk about it- your feelings and reasons for harming yourself  · Remove any means that you might use to hurt yourself (examples: pills, rope, extension cords, firearm)  · Get professional help from the community (Mental Health, Substance Abuse, psychological counseling)  · Do not be alone:Call your Safe Contact- someone whom you trust who will be there for you.  · Call your local CRISIS HOTLINE 802-7547 or 010-716-2018  · Call your local Children's Mobile Crisis Response Team [place] (473) 329-2777 or www.[employer]  · Call the toll free National Suicide Prevention Hotlines   · National Suicide Prevention Lifeline 165-243-OQGB (2045)  · National Hope Line Network 800-SUICIDE (090-7103)

## 2022-03-28 NOTE — OR NURSING
1330: Pt arrived from IR, handoff received from anesthesiologist and RN. Dressing to left flank with gauze and tegaderm in place, C/D/I. Patient awake, verbalizing needs appropriately, moving all extremities, denies presence of numbness or tingling. Pt states no pain or nausea at this time. Bed rest order for two hours, pt verbalized understanding.     1345: Patient with blood pressure of 184/94. Doctor John notified. No new orders received, Patient okay to DC home. Check fsbg if patient symptomatic.      1400: Patient denies pain or nausea, procedure site remains C/D/I. Patient tolerating oral liquid intake, remains asymptomatic.  Pt's mother updates on status.       1530: Patient completed 2 hours bed rest. Site remains C/D/I. Patient states no pain present at this time. Report given to JULINAO Laird in phase 2.

## 2022-03-28 NOTE — OR NURSING
Arrived to Phase II after report from Carmen HENAO.     VSS (long per MD for patient to discharge home with -190) , denies nausea, reports no pain. Ambulated from gurney to chair with no assistance needed. Steady gait.    Surgical site: left flank covered with gauze and tegaderm. No drainage or hematoma. No pain.     1546: Pt states he is diabetic. FSBG was checked and it was 47. x3 apple juices given to patient. He denies any symptoms aside from mild lightheadedness. A/Ox4. Notified Dr. Lopez and long to discharge home if BG continues to rise and patient remains asymptomatic.    1603: recheck FSBG= 73; administered apple juice and ainsley crackers. Pt states he remains asymptomatic and would like to go home. Declines any further juice or food.     1623: 3rd FSBG= 84. Pt eager to go home and states he feels great. Would like to go home.     1625: pt ambulated with no assistance needed, steady gait, to bathroom to void. He states he feels great and is eager to go home.     1629: VSS. Pt remains in stable condition and states he feels great and would like to go home and eat a meal there. Declines a hospital provided patient lunch box meal at this time. Discharge instructions reviewed with patient and family. Answered all questions at this time.     1645: Pt's ride arrived. Pt states he continues to feel well and would like to discharge home. PIV removed intact. Patient discharged in stable condition via wheelchair to responsible adult with all personal belongings in stable condition.

## 2022-03-31 LAB
ALBUMIN 24H MFR UR ELPH: 45.2 %
ALPHA1 GLOB 24H MFR UR ELPH: 12.1 %
ALPHA2 GLOB 24H MFR UR ELPH: 13.7 %
B-GLOBULIN 24H MFR UR ELPH: 16.2 %
COLLECT DURATION TIME SPEC: NORMAL HRS
EER MONOCLONAL PROTEIN STUDY, 24 HOUR U Q5964: NORMAL
GAMMA GLOB 24H MFR UR ELPH: 12.8 %
INTERPRETATION UR IFE-IMP: NORMAL
M PROTEIN 24H MFR UR ELPH: 0 %
M PROTEIN 24H UR ELPH-MRATE: NORMAL MG/24 HRS
PROT 24H UR-MRATE: NORMAL MG/D (ref 40–150)
PROT UR-MCNC: 627 MG/DL
SPECIMEN VOL ?TM UR: NORMAL ML

## 2022-04-01 ENCOUNTER — TELEPHONE (OUTPATIENT)
Dept: NEPHROLOGY | Facility: MEDICAL CENTER | Age: 52
End: 2022-04-01
Payer: MEDICARE

## 2022-04-02 NOTE — TELEPHONE ENCOUNTER
I spoke with patient on the phone about his kidney biopsy results.  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  Light microscopy also notable for 50% Interstitial Fibrosis and Tubular Atrophy  Comment: In light of the clinical history, the microangiopathic changes are likely secondary to anti-VEGF inhibitor use.    In light of kidney biopsy findings, I will message Dr. Yosef Dumont (ophtho) and ask to use alternate eye injection medication.    Continue routine GFR monitoring.  Continue maximum kidney medical therapy with ACE-I and SGLT2-inhibitor.     Varun Ortiz MD  Nephrology

## 2022-04-06 ENCOUNTER — OFFICE VISIT (OUTPATIENT)
Dept: CARDIOLOGY | Facility: MEDICAL CENTER | Age: 52
End: 2022-04-06
Payer: MEDICARE

## 2022-04-06 VITALS
OXYGEN SATURATION: 99 % | DIASTOLIC BLOOD PRESSURE: 88 MMHG | HEART RATE: 87 BPM | BODY MASS INDEX: 35.92 KG/M2 | HEIGHT: 72 IN | SYSTOLIC BLOOD PRESSURE: 172 MMHG | RESPIRATION RATE: 15 BRPM | WEIGHT: 265.2 LBS

## 2022-04-06 DIAGNOSIS — Z79.4 TYPE 2 DIABETES MELLITUS WITH HYPERGLYCEMIA, WITH LONG-TERM CURRENT USE OF INSULIN (HCC): Chronic | ICD-10-CM

## 2022-04-06 DIAGNOSIS — I25.10 CORONARY ARTERY DISEASE INVOLVING NATIVE CORONARY ARTERY OF NATIVE HEART WITHOUT ANGINA PECTORIS: ICD-10-CM

## 2022-04-06 DIAGNOSIS — I25.2 HISTORY OF NON-ST ELEVATION MYOCARDIAL INFARCTION (NSTEMI): ICD-10-CM

## 2022-04-06 DIAGNOSIS — D64.9 NORMOCYTIC ANEMIA: ICD-10-CM

## 2022-04-06 DIAGNOSIS — I21.4 NSTEMI (NON-ST ELEVATED MYOCARDIAL INFARCTION) (HCC): ICD-10-CM

## 2022-04-06 DIAGNOSIS — I10 PRIMARY HYPERTENSION: Chronic | ICD-10-CM

## 2022-04-06 DIAGNOSIS — E11.65 TYPE 2 DIABETES MELLITUS WITH HYPERGLYCEMIA, WITH LONG-TERM CURRENT USE OF INSULIN (HCC): Chronic | ICD-10-CM

## 2022-04-06 DIAGNOSIS — L08.9 DIABETIC FOOT INFECTION (HCC): ICD-10-CM

## 2022-04-06 DIAGNOSIS — E11.628 DIABETIC FOOT INFECTION (HCC): ICD-10-CM

## 2022-04-06 PROCEDURE — 99215 OFFICE O/P EST HI 40 MIN: CPT | Performed by: INTERNAL MEDICINE

## 2022-04-06 RX ORDER — ENALAPRIL MALEATE 20 MG/1
20 TABLET ORAL 2 TIMES DAILY
Qty: 60 TABLET | Refills: 11 | Status: SHIPPED | OUTPATIENT
Start: 2022-04-06 | End: 2022-11-17 | Stop reason: SDUPTHER

## 2022-04-06 RX ORDER — AMLODIPINE BESYLATE 5 MG/1
10 TABLET ORAL DAILY
Qty: 30 TABLET | Refills: 11 | Status: SHIPPED | OUTPATIENT
Start: 2022-04-06 | End: 2022-04-26 | Stop reason: SDUPTHER

## 2022-04-06 RX ORDER — CARVEDILOL 12.5 MG/1
12.5 TABLET ORAL 2 TIMES DAILY WITH MEALS
Qty: 60 TABLET | Refills: 11 | Status: SHIPPED | OUTPATIENT
Start: 2022-04-06 | End: 2022-11-17 | Stop reason: SDUPTHER

## 2022-04-06 ASSESSMENT — ENCOUNTER SYMPTOMS
NEUROLOGICAL NEGATIVE: 1
LOSS OF CONSCIOUSNESS: 0
RESPIRATORY NEGATIVE: 1
ORTHOPNEA: 0
MUSCULOSKELETAL NEGATIVE: 1
BRUISES/BLEEDS EASILY: 0
HEMOPTYSIS: 0
SHORTNESS OF BREATH: 0
SPUTUM PRODUCTION: 0
SORE THROAT: 0
CONSTITUTIONAL NEGATIVE: 1
WHEEZING: 0
FEVER: 0
EYES NEGATIVE: 1
CHILLS: 0
PALPITATIONS: 0
DIZZINESS: 0
CLAUDICATION: 0
PND: 0
STRIDOR: 0
COUGH: 0
CARDIOVASCULAR NEGATIVE: 1
WEAKNESS: 0
GASTROINTESTINAL NEGATIVE: 1

## 2022-04-06 ASSESSMENT — FIBROSIS 4 INDEX: FIB4 SCORE: 2.08

## 2022-04-06 NOTE — PROGRESS NOTES
Chief Complaint   Patient presents with   • Coronary Artery Disease     F/v DX: Coronary artery disease involving native coronary artery of native heart without angina pectoris        Subjective:   Beni Moore is a 51 y.o. male who presents today as a follow-up for his CAD status post stent with hypertension hyperlipidemia type 2 diabetes.  Since he was last seen he stopped his dual antiplatelet therapy.  Is asking to stop his medications.  His A1c is elevated.  His blood pressure is controlled.  He said no chest pain.    Since he was last seen he developed nephrotic range proteinuria.  Localizing almost down to 2.2.  He underwent a kidney biopsy with his nephrologist.  He was seen in urgent care due to systemic edema was started on furosemide 40 twice daily.  He said no response to his furosemide.  He said no resolution of his edema.  His blood pressures become more elevated.  He self increased his lisinopril to 40 with no effect.    Past Medical History:   Diagnosis Date   • Anesthesia     Hypotension post-op, persisted for a few months   • Bowel habit changes     History GI problems   • Cataract     IOL - Left eye   • Diabetes (HCC)     Oral medications & insulin   • Heart burn     GERD; takes Prilosec   • Hemorrhagic disorder (HCC)     ASA protection for stent and cardiac history   • Hx of heart artery stent 2019   • Hyperlipidemia    • Hypertension      Past Surgical History:   Procedure Laterality Date   • TOE AMPUTATION Left 9/30/2021    Procedure: AMPUTATION, TOE;  Surgeon: Tomer Hart M.D.;  Location: [facility];  Service: Orthopedics   • STENT PLACEMENT  2019    STEMI with nonobstructive LAD   • TOE AMPUTATION Right 7/2/2018    Procedure: Transmetatarsal amputation;  Surgeon: Ravi Bernardo M.D.;  Location: [facility];  Service: Orthopedics   • ACHILLES TENDON REPAIR Right 7/2/2018    Procedure: ACHILLES LENGTHENING;  Surgeon: Ravi Bernardo M.D.;  Location: [facility];  Service: Orthopedics   • IRRIGATION & DEBRIDEMENT ORTHO Right 2018    Procedure: IRRIGATION & DEBRIDEMENT ORTHO;  Surgeon: Ravi Bernardo M.D.;  Location: SUR[facility];  Service: Orthopedics   • OTHER Left     IOL   • OTHER ABDOMINAL SURGERY     • OTHER ORTHOPEDIC SURGERY     • TONSILLECTOMY     • VASECTOMY       Family History   Problem Relation Age of Onset   • No Known Problems Mother    • Diabetes Father    • Heart Disease Father    • Diabetes Maternal Grandmother    • Heart Disease Maternal Grandfather    • Lung Disease Paternal Grandmother    • Cancer Paternal Grandmother    • Cancer Paternal Grandfather    • Lung Cancer Paternal Grandfather    • Kidney Disease Neg Hx      Social History     Socioeconomic History   • Marital status:      Spouse name: Not on file   • Number of children: Not on file   • Years of education: Not on file   • Highest education level: 12th grade   Occupational History   • Not on file   Tobacco Use   • Smoking status: Former Smoker     Packs/day: 0.50     Years: 10.00     Pack years: 5.00     Types: Cigarettes     Quit date:      Years since quittin.2   • Smokeless tobacco: Never Used   Vaping Use   • Vaping Use: Never used   Substance and Sexual Activity   • Alcohol use: No   • Drug use: Yes     Types: Marijuana, Oral     Comment: CBD Edibles 3-4 times per week   • Sexual activity: Not Currently     Partners: Female   Other Topics Concern   • Not on file   Social History Narrative   • Not on file     Social Determinants of Health     Financial Resource Strain: Low Risk    • Difficulty of Paying Living Expenses: Not hard at all   Food Insecurity: No Food Insecurity   • Worried About Running Out of Food in the Last Year: Never true   • Ran Out of Food in the Last Year: Never true   Transportation Needs: No Transportation Needs   • Lack of Transportation (Medical): No   • Lack of Transportation (Non-Medical): No   Physical Activity:  Insufficiently Active   • Days of Exercise per Week: 3 days   • Minutes of Exercise per Session: 30 min   Stress: No Stress Concern Present   • Feeling of Stress : Not at all   Social Connections: Moderately Isolated   • Frequency of Communication with Friends and Family: More than three times a week   • Frequency of Social Gatherings with Friends and Family: More than three times a week   • Attends [employer] Services: Never   • Active Member of Clubs or Organizations: No   • Attends Club or Organization Meetings: Never   • Marital Status:    Intimate Partner Violence: Not on file   Housing Stability: Low Risk    • Unable to Pay for Housing in the Last Year: No   • Number of Places Lived in the Last Year: 2   • Unstable Housing in the Last Year: No     No Known Allergies  Outpatient Encounter Medications as of 4/6/2022   Medication Sig Dispense Refill   • carvedilol (COREG) 12.5 MG Tab Take 1 Tablet by mouth 2 times a day with meals. 60 Tablet 11   • enalapril (VASOTEC) 20 MG tablet Take 1 Tablet by mouth 2 times a day. 60 Tablet 11   • amLODIPine (NORVASC) 5 MG Tab Take 2 Tablets by mouth every day. 30 Tablet 11   • Empagliflozin 25 MG Tab Take 1 Tablet by mouth every day.     • furosemide (LASIX) 40 MG Tab Take 1 Tablet by mouth 2 times a day. 180 Tablet 3   • Insulin Pen Needle (PEN NEEDLES) 31G X 5 MM Misc Inject 1 Application under the skin every day. 90 Each 2   • glimepiride (AMARYL) 4 MG Tab Take 4 mg by mouth every morning.     • TRULICITY 1.5 MG/0.5ML Solution Pen-injector Inject 1 mL as directed every 7 days. Saturday     • insulin glargine (LANTUS) 100 UNIT/ML Solution 10 to 20 units depending on blood sugar in the [id number] (Patient taking differently: Inject 10-20 Units under the skin every evening. 10 to 20 units depending on blood sugar in the [id number]) 10 mL 2   • gabapentin (NEURONTIN) 100 MG Cap Take 1 Capsule by mouth at bedtime. 90 Capsule 0   • aspirin 81 MG EC tablet Take 1 Tablet by  mouth every day. 90 Tablet 3   • cholestyramine (QUESTRAN) 4 GM/DOSE powder Take 4 g by mouth 2 times a day. Mixed with 4-6 oz water.  Pt takes at 1000 and 1700.   Hold all other PO medications an hour before and 4 hours after dosing.     • atorvastatin (LIPITOR) 80 MG tablet Take 1 tablet by mouth every evening. 90 tablet 3   • omeprazole (PRILOSEC) 20 MG delayed-release capsule Take 20 mg by mouth every morning.     • [DISCONTINUED] cefdinir (OMNICEF) 300 MG Cap Take 300 mg by mouth 2 times a day. Pt started on 3/9/2022 for 7 day course (Patient not taking: Reported on 4/6/2022)     • [DISCONTINUED] lisinopril (PRINIVIL) 10 MG Tab Patient to take 3 tablets each day (Patient taking differently: Take 40 mg by mouth every day. Patient to take 4 tablets each day) 90 Tablet 2   • [DISCONTINUED] carvedilol (COREG) 6.25 MG Tab Take 1 Tablet by mouth 2 times a day. 180 Tablet 2     No facility-administered encounter medications on file as of 4/6/2022.     Review of Systems   Constitutional: Negative.  Negative for chills, fever and malaise/fatigue.   HENT: Negative.  Negative for sore throat.    Eyes: Negative.    Respiratory: Negative.  Negative for cough, hemoptysis, sputum production, shortness of breath, wheezing and stridor.    Cardiovascular: Negative.  Negative for chest pain, palpitations, orthopnea, claudication, leg swelling and PND.   Gastrointestinal: Negative.    Genitourinary: Negative.    Musculoskeletal: Negative.    Skin: Negative.    Neurological: Negative.  Negative for dizziness, loss of consciousness and weakness.   Endo/Heme/Allergies: Negative.  Does not bruise/bleed easily.   All other systems reviewed and are negative.       Objective:   BP (!) 172/88 (BP Location: Right arm, Patient Position: Sitting, BP Cuff Size: Adult)   Pulse 87   Resp 15   Ht 1.829 m (6')   Wt 120 kg (265 lb 3.2 oz)   SpO2 99%   BMI 35.97 kg/m²     Physical Exam  Vitals and nursing note reviewed.   Constitutional:        General: He is not in acute distress.     Appearance: He is well-developed. He is not diaphoretic.   HENT:      Head: Normocephalic and atraumatic.      Right Ear: External ear normal.      Left Ear: External ear normal.      Nose: Nose normal.      Mouth/Throat:      Pharynx: No oropharyngeal exudate.   Eyes:      General: No scleral icterus.        Right eye: No discharge.         Left eye: No discharge.      Conjunctiva/sclera: Conjunctivae normal.      Pupils: Pupils are equal, round, and reactive to light.   Neck:      Vascular: No JVD.   Cardiovascular:      Rate and Rhythm: Normal rate and regular rhythm.      Heart sounds: No murmur heard.    No friction rub. No gallop.   Pulmonary:      Effort: Pulmonary effort is normal. No respiratory distress.      Breath sounds: No stridor. No wheezing or rales.   Chest:      Chest wall: No tenderness.   Abdominal:      General: There is no distension.      Palpations: Abdomen is soft.      Tenderness: There is no guarding.   Musculoskeletal:         General: No tenderness or deformity. Normal range of motion.      Cervical back: Neck supple.   Skin:     General: Skin is warm and dry.      Coloration: Skin is not pale.      Findings: No erythema or rash.   Neurological:      Mental Status: He is alert.      Cranial Nerves: No cranial nerve deficit.      Motor: No abnormal muscle tone.      Coordination: Coordination normal.      Deep Tendon Reflexes: Reflexes are normal and symmetric. Reflexes normal.   Psychiatric:         Behavior: Behavior normal.         Thought Content: Thought content normal.         Judgment: Judgment normal.       Echocardiogram: Dated 9/24/2019 personally viewed interpreted by myself showing normal LV systolic function no valvular heart disease.    Stress test: Dated 9/21/2019 personally viewed interpreted by myself showing    Lab Results   Component Value Date/Time    CHOLSTRLTOT 79 (L) 06/02/2021 06:13 AM    LDL 26 06/02/2021 06:13 AM    HDL  35 (A) 06/02/2021 06:13 AM    TRIGLYCERIDE 88 06/02/2021 06:13 AM       Lab Results   Component Value Date/Time    SODIUM 142 03/09/2022 12:28 PM    POTASSIUM 4.5 03/09/2022 12:28 PM    CHLORIDE 112 03/09/2022 12:28 PM    CO2 21 03/09/2022 12:28 PM    GLUCOSE 127 (H) 03/09/2022 12:28 PM    BUN 19 03/09/2022 12:28 PM    CREATININE 1.57 (H) 03/09/2022 12:28 PM     Lab Results   Component Value Date/Time    ALKPHOSPHAT 75 03/09/2022 12:28 PM    ASTSGOT 39 03/09/2022 12:28 PM    ALTSGPT 19 03/09/2022 12:28 PM    TBILIRUBIN 1.4 03/09/2022 12:28 PM        Assessment:     1. Primary hypertension  amLODIPine (NORVASC) 5 MG Tab   2. Type 2 diabetes mellitus with hyperglycemia, with long-term current use of insulin ([facility])  Comp Metabolic Panel    amLODIPine (NORVASC) 5 MG Tab   3. Normocytic anemia  Comp Metabolic Panel    carvedilol (COREG) 12.5 MG Tab    enalapril (VASOTEC) 20 MG tablet    amLODIPine (NORVASC) 5 MG Tab   4. NSTEMI (non-ST elevated myocardial infarction) ([facility])  carvedilol (COREG) 12.5 MG Tab    enalapril (VASOTEC) 20 MG tablet    amLODIPine (NORVASC) 5 MG Tab   5. Coronary artery disease involving native coronary artery of native heart without angina pectoris  Comp Metabolic Panel    carvedilol (COREG) 12.5 MG Tab    enalapril (VASOTEC) 20 MG tablet    amLODIPine (NORVASC) 5 MG Tab   6. Diabetic foot infection ([facility])  Comp Metabolic Panel    carvedilol (COREG) 12.5 MG Tab    enalapril (VASOTEC) 20 MG tablet   7. History of non-ST elevation myocardial infarction (NSTEMI)  carvedilol (COREG) 12.5 MG Tab    enalapril (VASOTEC) 20 MG tablet       Medical Decision Making:  Today's Assessment / Status / Plan:     51-year-old male on dual antiplatelet therapy for ACS status post stent.  For his nephrotic range proteinuria and high blood pressure I will stop his lisinopril and switch to enalapril 20 twice daily.  I will also add on 10 of amlodipine.  I will increase his carvedilol to 12 twice daily.  We will check his  labs in 1 week.  We will see him back in 2 weeks.

## 2022-04-26 DIAGNOSIS — I10 PRIMARY HYPERTENSION: Chronic | ICD-10-CM

## 2022-04-26 DIAGNOSIS — Z79.4 TYPE 2 DIABETES MELLITUS WITH HYPERGLYCEMIA, WITH LONG-TERM CURRENT USE OF INSULIN (HCC): Chronic | ICD-10-CM

## 2022-04-26 DIAGNOSIS — E11.65 TYPE 2 DIABETES MELLITUS WITH HYPERGLYCEMIA, WITH LONG-TERM CURRENT USE OF INSULIN (HCC): Chronic | ICD-10-CM

## 2022-04-26 DIAGNOSIS — I25.10 CORONARY ARTERY DISEASE INVOLVING NATIVE CORONARY ARTERY OF NATIVE HEART WITHOUT ANGINA PECTORIS: ICD-10-CM

## 2022-04-26 DIAGNOSIS — D64.9 NORMOCYTIC ANEMIA: ICD-10-CM

## 2022-04-26 DIAGNOSIS — I21.4 NSTEMI (NON-ST ELEVATED MYOCARDIAL INFARCTION) (HCC): ICD-10-CM

## 2022-04-26 RX ORDER — AMLODIPINE BESYLATE 5 MG/1
10 TABLET ORAL DAILY
Qty: 60 TABLET | Refills: 11 | Status: SHIPPED | OUTPATIENT
Start: 2022-04-26 | End: 2022-11-17 | Stop reason: SDUPTHER

## 2022-05-12 DIAGNOSIS — R79.89 ELEVATED BRAIN NATRIURETIC PEPTIDE (BNP) LEVEL: ICD-10-CM

## 2022-05-12 DIAGNOSIS — R60.1 GENERALIZED EDEMA: ICD-10-CM

## 2022-05-13 RX ORDER — FUROSEMIDE 40 MG/1
40 TABLET ORAL 2 TIMES DAILY
Qty: 180 TABLET | Refills: 3 | Status: ON HOLD | OUTPATIENT
Start: 2022-05-13 | End: 2022-11-23

## 2022-05-13 RX ORDER — GLIMEPIRIDE 4 MG/1
TABLET ORAL
Qty: 90 TABLET | Refills: 0 | Status: SHIPPED | OUTPATIENT
Start: 2022-05-13 | End: 2022-08-11

## 2022-05-16 ENCOUNTER — HOSPITAL ENCOUNTER (OUTPATIENT)
Dept: LAB | Facility: MEDICAL CENTER | Age: 52
End: 2022-05-16
Attending: INTERNAL MEDICINE
Payer: MEDICARE

## 2022-05-16 DIAGNOSIS — E11.65 TYPE 2 DIABETES MELLITUS WITH HYPERGLYCEMIA, WITH LONG-TERM CURRENT USE OF INSULIN (HCC): Chronic | ICD-10-CM

## 2022-05-16 DIAGNOSIS — D64.9 ANEMIA, UNSPECIFIED TYPE: ICD-10-CM

## 2022-05-16 DIAGNOSIS — Z79.4 TYPE 2 DIABETES MELLITUS WITH HYPERGLYCEMIA, WITH LONG-TERM CURRENT USE OF INSULIN (HCC): Chronic | ICD-10-CM

## 2022-05-16 DIAGNOSIS — I10 PRIMARY HYPERTENSION: Chronic | ICD-10-CM

## 2022-05-16 DIAGNOSIS — N17.9 AKI (ACUTE KIDNEY INJURY) (HCC): ICD-10-CM

## 2022-05-16 DIAGNOSIS — Z86.39 HISTORY OF VITAMIN D DEFICIENCY: ICD-10-CM

## 2022-05-16 LAB
ALBUMIN SERPL BCP-MCNC: 2.5 G/DL (ref 3.2–4.9)
ANION GAP SERPL CALC-SCNC: 8 MMOL/L (ref 7–16)
APPEARANCE UR: CLEAR
BACTERIA #/AREA URNS HPF: NEGATIVE /HPF
BILIRUB UR QL STRIP.AUTO: NEGATIVE
BUN SERPL-MCNC: 20 MG/DL (ref 8–22)
CALCIUM SERPL-MCNC: 8.1 MG/DL (ref 8.5–10.5)
CHLORIDE SERPL-SCNC: 111 MMOL/L (ref 96–112)
CO2 SERPL-SCNC: 22 MMOL/L (ref 20–33)
COLOR UR: YELLOW
CREAT SERPL-MCNC: 1.42 MG/DL (ref 0.5–1.4)
CREAT UR-MCNC: 41.12 MG/DL
CREAT UR-MCNC: 42.96 MG/DL
EPI CELLS #/AREA URNS HPF: NEGATIVE /HPF
ERYTHROCYTE [DISTWIDTH] IN BLOOD BY AUTOMATED COUNT: 43.9 FL (ref 35.9–50)
FERRITIN SERPL-MCNC: 163 NG/ML (ref 22–322)
GFR SERPLBLD CREATININE-BSD FMLA CKD-EPI: 59 ML/MIN/1.73 M 2
GLUCOSE SERPL-MCNC: 79 MG/DL (ref 65–99)
GLUCOSE UR STRIP.AUTO-MCNC: 500 MG/DL
HCT VFR BLD AUTO: 34 % (ref 42–52)
HGB BLD-MCNC: 11.1 G/DL (ref 14–18)
HYALINE CASTS #/AREA URNS LPF: ABNORMAL /LPF
IRON SATN MFR SERPL: 17 % (ref 15–55)
IRON SERPL-MCNC: 45 UG/DL (ref 50–180)
KETONES UR STRIP.AUTO-MCNC: NEGATIVE MG/DL
LEUKOCYTE ESTERASE UR QL STRIP.AUTO: NEGATIVE
MCH RBC QN AUTO: 30.2 PG (ref 27–33)
MCHC RBC AUTO-ENTMCNC: 32.6 G/DL (ref 33.7–35.3)
MCV RBC AUTO: 92.4 FL (ref 81.4–97.8)
MICRO URNS: ABNORMAL
MICROALBUMIN UR-MCNC: 179.4 MG/DL
MICROALBUMIN/CREAT UR: 4363 MG/G (ref 0–30)
NITRITE UR QL STRIP.AUTO: NEGATIVE
PH UR STRIP.AUTO: 5.5 [PH] (ref 5–8)
PHOSPHATE SERPL-MCNC: 3.9 MG/DL (ref 2.5–4.5)
PLATELET # BLD AUTO: 243 K/UL (ref 164–446)
PMV BLD AUTO: 11.2 FL (ref 9–12.9)
POTASSIUM SERPL-SCNC: 3.8 MMOL/L (ref 3.6–5.5)
PROT UR QL STRIP: 300 MG/DL
PROT UR-MCNC: 286 MG/DL (ref 0–15)
PROT/CREAT UR: 6657 MG/G (ref 15–68)
PTH-INTACT SERPL-MCNC: 87.8 PG/ML (ref 14–72)
RBC # BLD AUTO: 3.68 M/UL (ref 4.7–6.1)
RBC # URNS HPF: ABNORMAL /HPF
RBC UR QL AUTO: ABNORMAL
SODIUM SERPL-SCNC: 141 MMOL/L (ref 135–145)
SP GR UR STRIP.AUTO: 1.01
TIBC SERPL-MCNC: 260 UG/DL (ref 250–450)
UIBC SERPL-MCNC: 215 UG/DL (ref 110–370)
UROBILINOGEN UR STRIP.AUTO-MCNC: 0.2 MG/DL
WBC # BLD AUTO: 7.8 K/UL (ref 4.8–10.8)
WBC #/AREA URNS HPF: ABNORMAL /HPF

## 2022-05-16 PROCEDURE — 82040 ASSAY OF SERUM ALBUMIN: CPT

## 2022-05-16 PROCEDURE — 80048 BASIC METABOLIC PNL TOTAL CA: CPT

## 2022-05-16 PROCEDURE — 84156 ASSAY OF PROTEIN URINE: CPT

## 2022-05-16 PROCEDURE — 83550 IRON BINDING TEST: CPT

## 2022-05-16 PROCEDURE — 83970 ASSAY OF PARATHORMONE: CPT

## 2022-05-16 PROCEDURE — 85027 COMPLETE CBC AUTOMATED: CPT

## 2022-05-16 PROCEDURE — 83540 ASSAY OF IRON: CPT

## 2022-05-16 PROCEDURE — 82570 ASSAY OF URINE CREATININE: CPT

## 2022-05-16 PROCEDURE — 82306 VITAMIN D 25 HYDROXY: CPT

## 2022-05-16 PROCEDURE — 36415 COLL VENOUS BLD VENIPUNCTURE: CPT

## 2022-05-16 PROCEDURE — 82043 UR ALBUMIN QUANTITATIVE: CPT

## 2022-05-16 PROCEDURE — 82728 ASSAY OF FERRITIN: CPT

## 2022-05-16 PROCEDURE — 84100 ASSAY OF PHOSPHORUS: CPT

## 2022-05-16 PROCEDURE — 81001 URINALYSIS AUTO W/SCOPE: CPT

## 2022-05-19 LAB — 25(OH)D3 SERPL-MCNC: <6 NG/ML (ref 30–100)

## 2022-05-20 ENCOUNTER — OFFICE VISIT (OUTPATIENT)
Dept: NEPHROLOGY | Facility: MEDICAL CENTER | Age: 52
End: 2022-05-20
Payer: MEDICARE

## 2022-05-20 VITALS
TEMPERATURE: 97.8 F | OXYGEN SATURATION: 97 % | SYSTOLIC BLOOD PRESSURE: 122 MMHG | HEART RATE: 80 BPM | HEIGHT: 72 IN | BODY MASS INDEX: 39.55 KG/M2 | DIASTOLIC BLOOD PRESSURE: 70 MMHG | WEIGHT: 292 LBS

## 2022-05-20 DIAGNOSIS — E21.3 HYPERPARATHYROIDISM (HCC): ICD-10-CM

## 2022-05-20 DIAGNOSIS — E11.65 TYPE 2 DIABETES MELLITUS WITH HYPERGLYCEMIA, WITH LONG-TERM CURRENT USE OF INSULIN (HCC): Chronic | ICD-10-CM

## 2022-05-20 DIAGNOSIS — I10 PRIMARY HYPERTENSION: Chronic | ICD-10-CM

## 2022-05-20 DIAGNOSIS — N18.31 STAGE 3A CHRONIC KIDNEY DISEASE: ICD-10-CM

## 2022-05-20 DIAGNOSIS — Z79.4 TYPE 2 DIABETES MELLITUS WITH HYPERGLYCEMIA, WITH LONG-TERM CURRENT USE OF INSULIN (HCC): Chronic | ICD-10-CM

## 2022-05-20 DIAGNOSIS — E55.9 VITAMIN D DEFICIENCY: ICD-10-CM

## 2022-05-20 DIAGNOSIS — D64.9 NORMOCYTIC ANEMIA: ICD-10-CM

## 2022-05-20 PROCEDURE — 99214 OFFICE O/P EST MOD 30 MIN: CPT | Performed by: INTERNAL MEDICINE

## 2022-05-20 RX ORDER — ERGOCALCIFEROL 1.25 MG/1
50000 CAPSULE ORAL
Qty: 12 CAPSULE | Refills: 0 | Status: SHIPPED | OUTPATIENT
Start: 2022-05-20 | End: 2022-11-11

## 2022-05-20 RX ORDER — METOLAZONE 5 MG/1
5 TABLET ORAL DAILY
Qty: 90 TABLET | Refills: 3 | Status: SHIPPED | OUTPATIENT
Start: 2022-05-20 | End: 2022-11-11

## 2022-05-20 ASSESSMENT — ENCOUNTER SYMPTOMS
FEVER: 0
ABDOMINAL PAIN: 0
SHORTNESS OF BREATH: 0

## 2022-05-20 ASSESSMENT — FIBROSIS 4 INDEX: FIB4 SCORE: 1.95

## 2022-05-20 NOTE — PATIENT INSTRUCTIONS
Vital Proteins. Flavorless.   PB2 - peanut based protein.   Drink no more than 1.5-2L a day, which is about 50-68 oz of fluid.

## 2022-05-20 NOTE — PROGRESS NOTES
Chief Complaint   Patient presents with   • Follow-Up     AXEL       CC: f/u CKD, diabetic nephropathy, chronic TMA    HPI:  Beni Moore is a 51 y.o. male with diabetes complicated by retinopathy requiring injections, hypertension, CKD due to biopsy-proven diabetic nephropathy and chronic TMA who presents today for follow-up.    Re: DM2, diagnosed 1998. Patient has had microalbuminuria since at least 2018. Patient has seen an eye doctor and does have retinopathy requiring Eyelea injections (Dr. Yosef Dumont) since 2020 and it has saved his vision. He has also had some foot amputations. He thinks his sugars are good. He's on Jardiance. He has f/u appt with [facility] next week.     Re: HTN, diagnosed 2018. BP control over the years has been controlled, until mid-late 2021. He is still having massive edema. He's on lasix 40mg BID, and feels diuretic effect. He thinks he drinks 2-2.5L of water and 1.5-2L of green tea per day. His weight used to be 225lbs but has been up to the 280-290lb range since February.     Re: AXEL/CKD, denies history of AXEL, kidney stone.  He denies LUTS. Denies NSAIDs.    Re: CAD, he had MI in 9/2020 and was started on coreg. He's also on aspirin.       Past Medical History:   Diagnosis Date   • Anesthesia     Hypotension post-op, persisted for a few months   • Bowel habit changes     History GI problems   • Cataract     IOL - Left eye   • Diabetes (HCC)     Oral medications & insulin   • Heart burn     GERD; takes Prilosec   • Hemorrhagic disorder (HCC)     ASA protection for stent and cardiac history   • Hx of heart artery stent 2019   • Hyperlipidemia    • Hypertension        Past Surgical History:   Procedure Laterality Date   • TOE AMPUTATION Left 9/30/2021    Procedure: AMPUTATION, TOE;  Surgeon: Tomer Hart M.D.;  Location: SURGERY [facility];  Service: Orthopedics   • STENT PLACEMENT  2019    STEMI with nonobstructive LAD   • TOE AMPUTATION Right 7/2/2018    Procedure:  Transmetatarsal amputation;  Surgeon: Ravi Bernardo M.D.;  Location: SURGERY [facility];  Service: Orthopedics   • ACHILLES TENDON REPAIR Right 7/2/2018    Procedure: ACHILLES LENGTHENING;  Surgeon: Ravi Bernardo M.D.;  Location: SURGERY [facility];  Service: Orthopedics   • IRRIGATION & DEBRIDEMENT ORTHO Right 7/2/2018    Procedure: IRRIGATION & DEBRIDEMENT ORTHO;  Surgeon: Ravi Bernardo M.D.;  Location: SURGERY [facility];  Service: Orthopedics   • OTHER Left     IOL   • OTHER ABDOMINAL SURGERY     • OTHER ORTHOPEDIC SURGERY     • TONSILLECTOMY     • VASECTOMY          Outpatient Encounter Medications as of 5/20/2022   Medication Sig Dispense Refill   • glimepiride (AMARYL) 4 MG Tab TAKE 1 TABLET DAILY 90 Tablet 0   • amLODIPine (NORVASC) 5 MG Tab Take 2 Tablets by mouth every day. 60 Tablet 11   • furosemide (LASIX) 40 MG Tab Take 1 Tablet by mouth 2 times a day. 180 Tablet 3   • carvedilol (COREG) 12.5 MG Tab Take 1 Tablet by mouth 2 times a day with meals. 60 Tablet 11   • enalapril (VASOTEC) 20 MG tablet Take 1 Tablet by mouth 2 times a day. 60 Tablet 11   • Empagliflozin 25 MG Tab Take 1 Tablet by mouth every day.     • Insulin Pen Needle (PEN NEEDLES) 31G X 5 MM Misc Inject 1 Application under the skin every day. 90 Each 2   • TRULICITY 1.5 MG/0.5ML Solution Pen-injector Inject 1 mL as directed every 7 days. Saturday     • insulin glargine (LANTUS) 100 UNIT/ML Solution 10 to 20 units depending on blood sugar in the [id number] (Patient taking differently: Inject 10-20 Units under the skin every evening. 10 to 20 units depending on blood sugar in the [id number]) 10 mL 2   • gabapentin (NEURONTIN) 100 MG Cap Take 1 Capsule by mouth at bedtime. 90 Capsule 0   • aspirin 81 MG EC tablet Take 1 Tablet by mouth every day. 90 Tablet 3   • cholestyramine (QUESTRAN) 4 GM/DOSE powder Take 4 g by mouth 2 times a day. Mixed with 4-6 oz water.  Pt takes at 1000 and 1700.   Hold all other PO  medications an hour before and 4 hours after dosing.     • atorvastatin (LIPITOR) 80 MG tablet Take 1 tablet by mouth every evening. 90 tablet 3   • omeprazole (PRILOSEC) 20 MG delayed-release capsule Take 20 mg by mouth every morning.       No facility-administered encounter medications on file as of 5/20/2022.        No Known Allergies          Review of Systems   Constitutional: Negative for fever.   Respiratory: Negative for shortness of breath.    Cardiovascular: Positive for leg swelling. Negative for chest pain.   Gastrointestinal: Negative for abdominal pain.   Genitourinary: Negative for dysuria and hematuria.   All other systems reviewed and are negative.      /70 (BP Location: Right arm, Patient Position: Sitting)   Pulse 80   Temp 36.6 °C (97.8 °F) (Temporal)   Ht 1.829 m (6')   Wt (!) 132 kg (292 lb)   SpO2 97%   BMI 39.60 kg/m²     Physical Exam  Constitutional:       General: He is not in acute distress.     Appearance: He is obese.   HENT:      Mouth/Throat:      Pharynx: No oropharyngeal exudate.   Eyes:      General: No scleral icterus.  Neck:      Trachea: No tracheal deviation.   Cardiovascular:      Rate and Rhythm: Normal rate and regular rhythm.      Heart sounds: Normal heart sounds. No murmur heard.  Pulmonary:      Effort: Pulmonary effort is normal.      Breath sounds: Normal breath sounds. No stridor. No rales.   Abdominal:      General: Bowel sounds are normal.      Palpations: Abdomen is soft.      Tenderness: There is no abdominal tenderness.      Comments: Abdominal wall edema noted   Musculoskeletal:         General: Normal range of motion.      Cervical back: Neck supple.      Right lower leg: Edema (3-4+) present.      Left lower leg: Edema (3-4+) present.   Skin:     General: Skin is warm and dry.      Findings: No rash.   Neurological:      General: No focal deficit present.      Mental Status: He is alert and oriented to person, place, and time.   Psychiatric:          Mood and Affect: Mood and affect normal.         Behavior: Behavior normal.           Labs reviewed.    Recent Labs     06/02/21  0613 06/09/21 2031 09/30/21  0458 10/01/21  0329 10/04/21  0329 03/09/22  1228 03/22/22  1350 05/16/22  1111   ALBUMIN 3.4   < > 3.6  --   --  2.2* 1.77*  2.2* 2.5*   HDL 35*  --   --   --   --   --   --   --    TRIGLYCERIDE 88  --   --   --   --   --   --   --    SODIUM 138   < > 135   < > 138 142  --  141   POTASSIUM 4.6   < > 3.9   < > 3.9 4.5  --  3.8   CHLORIDE 109   < > 102   < > 106 112  --  111   CO2 22   < > 21   < > 23 21  --  22   BUN 19   < > 15   < > 9 19  --  20   CREATININE 1.44*   < > 1.15   < > 0.80 1.57*  --  1.42*   PHOSPHORUS  --   --  3.0  --  2.6  --   --  3.9    < > = values in this interval not displayed.       Lab Results   Component Value Date/Time    WBC 7.8 05/16/2022 11:11 AM    RBC 3.68 (L) 05/16/2022 11:11 AM    HEMOGLOBIN 11.1 (L) 05/16/2022 11:11 AM    HEMATOCRIT 34.0 (L) 05/16/2022 11:11 AM    MCV 92.4 05/16/2022 11:11 AM    MCH 30.2 05/16/2022 11:11 AM    MCHC 32.6 (L) 05/16/2022 11:11 AM    MPV 11.2 05/16/2022 11:11 AM              URINALYSIS:  Lab Results   Component Value Date/Time    COLORURINE Yellow 05/16/2022 1111    CLARITY Clear 05/16/2022 1111    SPECGRAVITY 1.014 05/16/2022 1111    PHURINE 5.5 05/16/2022 1111    KETONES Negative 05/16/2022 1111    PROTEINURIN 300 (A) 05/16/2022 1111    BILIRUBINUR Negative 05/16/2022 1111    UROBILU 0.2 05/16/2022 1111    NITRITE Negative 05/16/2022 1111    LEUKESTERAS Negative 05/16/2022 1111    OCCULTBLOOD Small (A) 05/16/2022 1111       [facility]  Lab Results   Component Value Date/Time    TOTPROTUR 286.0 (H) 05/16/2022 1111      Lab Results   Component Value Date/Time    CREATININEU 42.96 05/16/2022 1111         Imaging reviewed  No orders to display       Kidney biopsy 3/29/2022 interpreted by Dr. Ac  Diagnosis: Chronic glomerular microangiopathic changes.  Nodular diabetic glomerulosclerosis.  Comment:  In light of the clinical history, the microangiopathic changes are likely secondary to anti-VEGF inhibitor use.  Other causes of chronic thrombotic microangiopathy are also on the differential.  There are no acute thrombi.  Light microscopy notable for approximately 50% interstitial fibrosis and tubular atrophy as well as evidence of acute tubular injury      Assessment:  Beni Moore is a 51 y.o. male with diabetes complicated by retinopathy requiring injections, hypertension, CKD due to biopsy-proven diabetic nephropathy and chronic TMA who presents today for follow-up.    Plan:  1. CKD stage 3a.    - Underlying CKD from biopsy-proven diabetic nephropathy and chronic thrombotic microangiopathy from anti-VEGF ocular injections.  I recommend avoid NSAIDs and other nephrotoxins.  I recommend a low-salt diet.  I explained the importance of blood pressure and glycemic control to help slow CKD progression.  This patient is at high risk of CKD progression to ESRD due to nephrotic range proteinuria.  Maintain maximum medical therapy with ACE inhibitor and SGLT2 inhibitor.    2. Type 2 diabetes mellitus with hyperglycemia, with long-term current use of insulin (HCC)  -Controlled per patient.  Continue maximum medical therapy with ACE inhibitor and SGLT2 inhibitor.  Defer further management to primary care.    3. Primary hypertension  -Controlled, but still with massive lower extremity edema.  Patient is drinking over 3 L of liquid a day.  I recommend he drink no more than 1.5 or 2 L of liquid a day.  He is having good diuretic response to Lasix 40 mg dose, but as he remains overloaded, recommend add metolazone 5 mg p.o. daily.      4.  Anemia, unspecified  - Iron studies mildly low.  Continue to monitor, recommend increase iron in diet.  If iron deficiency persists and remains anemic, would recommend starting ferrous sulfate 325 mg p.o. daily.    5.  Hyperparathyroidism and vitamin D deficiency  - prescribe  ergocalciferol 50,000 units by mouth weekly for 12 weeks.       Return to clinic 3 months with preclinic labs    Varun Ortiz MD  Nephrology

## 2022-06-02 DIAGNOSIS — E78.5 HYPERLIPIDEMIA, UNSPECIFIED HYPERLIPIDEMIA TYPE: ICD-10-CM

## 2022-06-03 RX ORDER — ATORVASTATIN CALCIUM 80 MG/1
TABLET, FILM COATED ORAL
Qty: 90 TABLET | Refills: 3 | Status: SHIPPED | OUTPATIENT
Start: 2022-06-03 | End: 2022-11-27

## 2022-06-23 RX ORDER — AZITHROMYCIN 250 MG/1
TABLET, FILM COATED ORAL
Qty: 6 TABLET | Refills: 0 | Status: SHIPPED | OUTPATIENT
Start: 2022-06-23 | End: 2022-06-27

## 2022-08-15 DIAGNOSIS — I10 PRIMARY HYPERTENSION: Chronic | ICD-10-CM

## 2022-08-15 DIAGNOSIS — N18.31 STAGE 3A CHRONIC KIDNEY DISEASE: ICD-10-CM

## 2022-08-15 DIAGNOSIS — E55.9 VITAMIN D DEFICIENCY: ICD-10-CM

## 2022-08-15 DIAGNOSIS — D64.9 NORMOCYTIC ANEMIA: ICD-10-CM

## 2022-08-15 DIAGNOSIS — E11.65 TYPE 2 DIABETES MELLITUS WITH HYPERGLYCEMIA, WITH LONG-TERM CURRENT USE OF INSULIN (HCC): Chronic | ICD-10-CM

## 2022-08-15 DIAGNOSIS — E21.3 HYPERPARATHYROIDISM (HCC): ICD-10-CM

## 2022-08-15 DIAGNOSIS — Z79.4 TYPE 2 DIABETES MELLITUS WITH HYPERGLYCEMIA, WITH LONG-TERM CURRENT USE OF INSULIN (HCC): Chronic | ICD-10-CM

## 2022-08-23 ENCOUNTER — APPOINTMENT (OUTPATIENT)
Dept: NEPHROLOGY | Facility: MEDICAL CENTER | Age: 52
End: 2022-08-23
Payer: MEDICARE

## 2022-10-27 ENCOUNTER — TELEPHONE (OUTPATIENT)
Dept: HEALTH INFORMATION MANAGEMENT | Facility: OTHER | Age: 52
End: 2022-10-27
Payer: MEDICARE

## 2022-11-10 SDOH — HEALTH STABILITY: PHYSICAL HEALTH
ON AVERAGE, HOW MANY DAYS PER WEEK DO YOU ENGAGE IN MODERATE TO STRENUOUS EXERCISE (LIKE A BRISK WALK)?: PATIENT DECLINED

## 2022-11-10 SDOH — ECONOMIC STABILITY: FOOD INSECURITY: WITHIN THE PAST 12 MONTHS, YOU WORRIED THAT YOUR FOOD WOULD RUN OUT BEFORE YOU GOT MONEY TO BUY MORE.: NEVER TRUE

## 2022-11-10 SDOH — ECONOMIC STABILITY: FOOD INSECURITY: WITHIN THE PAST 12 MONTHS, THE FOOD YOU BOUGHT JUST DIDN'T LAST AND YOU DIDN'T HAVE MONEY TO GET MORE.: NEVER TRUE

## 2022-11-10 SDOH — ECONOMIC STABILITY: INCOME INSECURITY: HOW HARD IS IT FOR YOU TO PAY FOR THE VERY BASICS LIKE FOOD, HOUSING, MEDICAL CARE, AND HEATING?: NOT VERY HARD

## 2022-11-10 SDOH — ECONOMIC STABILITY: HOUSING INSECURITY: IN THE LAST 12 MONTHS, HOW MANY PLACES HAVE YOU LIVED?: 1

## 2022-11-10 SDOH — ECONOMIC STABILITY: INCOME INSECURITY: IN THE LAST 12 MONTHS, WAS THERE A TIME WHEN YOU WERE NOT ABLE TO PAY THE MORTGAGE OR RENT ON TIME?: NO

## 2022-11-10 SDOH — HEALTH STABILITY: PHYSICAL HEALTH: ON AVERAGE, HOW MANY MINUTES DO YOU ENGAGE IN EXERCISE AT THIS LEVEL?: PATIENT DECLINED

## 2022-11-10 ASSESSMENT — SOCIAL DETERMINANTS OF HEALTH (SDOH)
HOW OFTEN DO YOU ATTEND CHURCH OR RELIGIOUS SERVICES?: 1 TO 4 TIMES PER YEAR
HOW OFTEN DO YOU HAVE A DRINK CONTAINING ALCOHOL: NEVER
DO YOU BELONG TO ANY CLUBS OR ORGANIZATIONS SUCH AS CHURCH GROUPS UNIONS, FRATERNAL OR ATHLETIC GROUPS, OR SCHOOL GROUPS?: NO
HOW OFTEN DO YOU ATTENT MEETINGS OF THE CLUB OR ORGANIZATION YOU BELONG TO?: NEVER
WITHIN THE PAST 12 MONTHS, YOU WORRIED THAT YOUR FOOD WOULD RUN OUT BEFORE YOU GOT THE MONEY TO BUY MORE: NEVER TRUE
IN A TYPICAL WEEK, HOW MANY TIMES DO YOU TALK ON THE PHONE WITH FAMILY, FRIENDS, OR NEIGHBORS?: MORE THAN THREE TIMES A WEEK
HOW OFTEN DO YOU GET TOGETHER WITH FRIENDS OR RELATIVES?: TWICE A WEEK
HOW OFTEN DO YOU ATTEND CHURCH OR RELIGIOUS SERVICES?: 1 TO 4 TIMES PER YEAR
HOW OFTEN DO YOU ATTENT MEETINGS OF THE CLUB OR ORGANIZATION YOU BELONG TO?: NEVER
HOW HARD IS IT FOR YOU TO PAY FOR THE VERY BASICS LIKE FOOD, HOUSING, MEDICAL CARE, AND HEATING?: NOT VERY HARD
IN A TYPICAL WEEK, HOW MANY TIMES DO YOU TALK ON THE PHONE WITH FAMILY, FRIENDS, OR NEIGHBORS?: MORE THAN THREE TIMES A WEEK
HOW MANY DRINKS CONTAINING ALCOHOL DO YOU HAVE ON A TYPICAL DAY WHEN YOU ARE DRINKING: PATIENT DOES NOT DRINK
DO YOU BELONG TO ANY CLUBS OR ORGANIZATIONS SUCH AS CHURCH GROUPS UNIONS, FRATERNAL OR ATHLETIC GROUPS, OR SCHOOL GROUPS?: NO
HOW OFTEN DO YOU HAVE SIX OR MORE DRINKS ON ONE OCCASION: NEVER
HOW OFTEN DO YOU GET TOGETHER WITH FRIENDS OR RELATIVES?: TWICE A WEEK

## 2022-11-10 ASSESSMENT — LIFESTYLE VARIABLES
HOW OFTEN DO YOU HAVE SIX OR MORE DRINKS ON ONE OCCASION: NEVER
HOW OFTEN DO YOU HAVE A DRINK CONTAINING ALCOHOL: NEVER
AUDIT-C TOTAL SCORE: 0
HOW MANY STANDARD DRINKS CONTAINING ALCOHOL DO YOU HAVE ON A TYPICAL DAY: PATIENT DOES NOT DRINK
SKIP TO QUESTIONS 9-10: 1

## 2022-11-11 ENCOUNTER — OFFICE VISIT (OUTPATIENT)
Dept: MEDICAL GROUP | Facility: PHYSICIAN GROUP | Age: 52
End: 2022-11-11
Payer: MEDICARE

## 2022-11-11 VITALS
TEMPERATURE: 98.6 F | BODY MASS INDEX: 38.56 KG/M2 | HEART RATE: 82 BPM | HEIGHT: 72 IN | RESPIRATION RATE: 16 BRPM | WEIGHT: 284.7 LBS | SYSTOLIC BLOOD PRESSURE: 138 MMHG | OXYGEN SATURATION: 96 % | DIASTOLIC BLOOD PRESSURE: 82 MMHG

## 2022-11-11 DIAGNOSIS — I25.10 CORONARY ARTERY DISEASE INVOLVING NATIVE CORONARY ARTERY OF NATIVE HEART WITHOUT ANGINA PECTORIS: ICD-10-CM

## 2022-11-11 DIAGNOSIS — L08.9 DIABETIC FOOT INFECTION (HCC): ICD-10-CM

## 2022-11-11 DIAGNOSIS — Z79.4 TYPE 2 DIABETES MELLITUS WITH HYPERGLYCEMIA, WITH LONG-TERM CURRENT USE OF INSULIN (HCC): Chronic | ICD-10-CM

## 2022-11-11 DIAGNOSIS — I25.2 HISTORY OF NON-ST ELEVATION MYOCARDIAL INFARCTION (NSTEMI): ICD-10-CM

## 2022-11-11 DIAGNOSIS — E11.628 DIABETIC FOOT INFECTION (HCC): ICD-10-CM

## 2022-11-11 DIAGNOSIS — E11.65 TYPE 2 DIABETES MELLITUS WITH HYPERGLYCEMIA, WITH LONG-TERM CURRENT USE OF INSULIN (HCC): Chronic | ICD-10-CM

## 2022-11-11 DIAGNOSIS — I10 PRIMARY HYPERTENSION: Chronic | ICD-10-CM

## 2022-11-11 DIAGNOSIS — N18.31 STAGE 3A CHRONIC KIDNEY DISEASE: ICD-10-CM

## 2022-11-11 DIAGNOSIS — D64.9 NORMOCYTIC ANEMIA: ICD-10-CM

## 2022-11-11 DIAGNOSIS — I21.4 NSTEMI (NON-ST ELEVATED MYOCARDIAL INFARCTION) (HCC): ICD-10-CM

## 2022-11-11 DIAGNOSIS — H35.00 RETINOPATHY: ICD-10-CM

## 2022-11-11 DIAGNOSIS — Z89.431 S/P TRANSMETATARSAL AMPUTATION OF FOOT, RIGHT (HCC): ICD-10-CM

## 2022-11-11 PROBLEM — E66.3 OVERWEIGHT (BMI 25.0-29.9): Status: RESOLVED | Noted: 2018-06-30 | Resolved: 2022-11-11

## 2022-11-11 LAB
HBA1C MFR BLD: 5.5 % (ref 0–5.6)
INT CON NEG: NEGATIVE
INT CON POS: POSITIVE

## 2022-11-11 PROCEDURE — 83036 HEMOGLOBIN GLYCOSYLATED A1C: CPT | Performed by: FAMILY MEDICINE

## 2022-11-11 PROCEDURE — 99214 OFFICE O/P EST MOD 30 MIN: CPT | Performed by: FAMILY MEDICINE

## 2022-11-11 RX ORDER — CHOLESTYRAMINE 4 G/9G
POWDER, FOR SUSPENSION ORAL
COMMUNITY
Start: 2022-11-03 | End: 2022-11-11

## 2022-11-11 ASSESSMENT — FIBROSIS 4 INDEX: FIB4 SCORE: 1.988269693544868638

## 2022-11-11 NOTE — LETTER
[facility]  Florinda Pascual M.D.  [address]  [facility] [address]  Fax: [phone]   Authorization for Release/Disclosure of   Protected Health Information   Name: HELIO MOORE : 1970 SSN: [id number]   Address: [address] [place] [address] Phone:    [phone] (home)    I authorize the entity listed below to release/disclose the PHI below to:   [facility]/Florinda Pascual M.D. and Florinda Pascual M.D.   Provider or Entity Name:Dr Dumont     Address   City, State, Zip   Phone:      Fax:[phone]     Reason for request: continuity of care   Information to be released:    [  ] LAST COLONOSCOPY,  including any PATH REPORT and follow-up  [  ] LAST FIT/COLOGUARD RESULT [  ] LAST DEXA  [  ] LAST MAMMOGRAM  [  ] LAST PAP  [  ] LAST LABS [xx ] RETINA EXAM REPORT  [  ] IMMUNIZATION RECORDS  [  ] Release all info      [  ] Check here and initial the line next to each item to release ALL health information INCLUDING  _____ Care and treatment for drug and / or alcohol abuse  _____ HIV testing, infection status, or AIDS  _____ Genetic Testing    DATES OF SERVICE OR TIME PERIOD TO BE DISCLOSED: _____________  I understand and acknowledge that:  * This Authorization may be revoked at any time by you in writing, except if your health information has already been used or disclosed.  * Your health information that will be used or disclosed as a result of you signing this authorization could be re-disclosed by the recipient. If this occurs, your re-disclosed health information may no longer be protected by State or Federal laws.  * You may refuse to sign this Authorization. Your refusal will not affect your ability to obtain treatment.  * This Authorization becomes effective upon signing and will  on (date) __________.      If no date is indicated, this Authorization will  one (1) year from the signature date.    Name: Helio Moore    Signature:   Date:     2022       PLEASE FAX  REQUESTED RECORDS BACK TO: [phone]

## 2022-11-11 NOTE — TELEPHONE ENCOUNTER
Is the patient due for a refill? Yes    Was the patient seen the past year? Yes    Date of last office visit: 4/06/2022    Does the patient have an upcoming appointment?  No    Provider to refill:RO    Does the patients insurance require a 100 day supply?  No

## 2022-11-12 NOTE — PROGRESS NOTES
Subjective:     CC:   Chief Complaint   Patient presents with    Follow-Up       HPI:   Beni presents today for follow-up.  I have not seen him for 11 months.  Patient is seeing his gastroenterologist, his nephrologist and cardiologist.  Patient is not having any further issues with his feet being infected he checks it regularly.  Patient admits that one morning his blood sugar was rather low at this time he is taking 15 units of his long-acting insulin.  We discussed possibly decreasing it and patient will back it down and cute continue monitoring his diabetes.  Patient states he cannot afford his present medical insurance and he is thinking of going to a different type of Medicare insurance policy he may have to find a new primary care provider he does not know if [facility] will accept his insurance.    Past Medical History:   Diagnosis Date    Anesthesia     Hypotension post-op, persisted for a few months    Bowel habit changes     History GI problems    Cataract     IOL - Left eye    Diabetes (HCC)     Oral medications & insulin    Heart burn     GERD; takes Prilosec    Hemorrhagic disorder (HCC)     ASA protection for stent and cardiac history    Hx of heart artery stent 2019    Hyperlipidemia     Hypertension        Social History     Tobacco Use    Smoking status: Former     Packs/day: 0.50     Years: 10.00     Pack years: 5.00     Types: Cigarettes     Quit date:      Years since quittin.8    Smokeless tobacco: Never   Vaping Use    Vaping Use: Never used   Substance Use Topics    Alcohol use: No    Drug use: Not Currently     Types: Marijuana, Oral     Comment: CBD Edibles 3-4 times per week       Current Outpatient Medications Ordered in Epic   Medication Sig Dispense Refill    aspirin 81 MG EC tablet TAKE 1 TABLET BY MOUTH ONCE DAILY 90 Tablet 1    insulin glargine 100 UNIT/ML Solution Pen-injector injection Inject 10-20 Units under the skin every evening. NEED LABS AND APPOINTMENT PRIOR TO  ADDITIONAL REFILLS 2 Each 0    glimepiride (AMARYL) 4 MG Tab TAKE 1 TABLET DAILY 90 Tablet 1    atorvastatin (LIPITOR) 80 MG tablet TAKE 1 TABLET EVERY EVENING 90 Tablet 3    furosemide (LASIX) 40 MG Tab Take 1 Tablet by mouth 2 times a day. 180 Tablet 3    amLODIPine (NORVASC) 5 MG Tab Take 2 Tablets by mouth every day. 60 Tablet 11    carvedilol (COREG) 12.5 MG Tab Take 1 Tablet by mouth 2 times a day with meals. 60 Tablet 11    enalapril (VASOTEC) 20 MG tablet Take 1 Tablet by mouth 2 times a day. 60 Tablet 11    Insulin Pen Needle (PEN NEEDLES) 31G X 5 MM Misc Inject 1 Application under the skin every day. 90 Each 2    TRULICITY 1.5 MG/0.5ML Solution Pen-injector Inject 1 mL as directed every 7 days. Saturday      cholestyramine (QUESTRAN) 4 GM/DOSE powder Take 4 g by mouth 2 times a day. Mixed with 4-6 oz water.  Pt takes at 1000 and 1700.   Hold all other PO medications an hour before and 4 hours after dosing.      omeprazole (PRILOSEC) 20 MG delayed-release capsule Take 1 Capsule by mouth every morning.      metOLazone (ZAROXOLYN) 5 MG Tab Take 1 Tablet by mouth every day. 90 Tablet 3    vitamin D2, Ergocalciferol, (DRISDOL) 1.25 MG (62727 UT) Cap capsule Take 1 Capsule by mouth every 7 days. (Patient not taking: Reported on 11/11/2022) 12 Capsule 0    gabapentin (NEURONTIN) 100 MG Cap Take 1 Capsule by mouth at bedtime. (Patient not taking: Reported on 11/11/2022) 90 Capsule 0     No current Epic-ordered facility-administered medications on file.       Allergies:  Patient has no known allergies.    Health Maintenance: Completed    ROS:  Gen: no fevers/chills, no changes in weight  Eyes: no changes in vision  ENT: no sore throat, no hearing loss, no bloody nose  Pulm: no sob, no cough  CV: no chest pain, no palpitations  GI: no nausea/vomiting, no diarrhea  : no dysuria  Neuro: no headaches, no numbness/tingling  Heme/Lymph: no easy bruising    Objective:     Exam:  /82 (BP Location: Left arm, Patient  Position: Sitting, BP Cuff Size: Large adult)   Pulse 82   Temp 37 °C (98.6 °F) (Temporal)   Resp 16   Ht 1.829 m (6')   Wt (!) 129 kg (284 lb 11.2 oz)   SpO2 96%   BMI 38.61 kg/m²  Body mass index is 38.61 kg/m².    Gen: Alert and oriented, No apparent distress.  Skin: Warm and dry.  No obvious lesions.  Eyes: Sclera wnl Pupils normal in size  Lungs: Normal effort, CTA bilaterally, no wheezes, rhonchi, or rales  CV: Regular rate and rhythm. No murmurs, rubs, or gallops.  ABD: Soft non-tender no organomegaly  Musculoskeletal: Normal gait. No extremity cyanosis, clubbing, or edema.  Neuro: Oriented to person, place and time  Psych: Mood is wnl       Labs: Patient's hemoglobin A1c was 5.5    Assessment & Plan:     52 y.o. male with the following -     1. Type 2 diabetes mellitus with hyperglycemia, with long-term current use of insulin ([facility]) patient's hemoglobin A1c is 5.5 he may be under too tight control patient did increase his long-acting insulin and will probably need to recheck his hemoglobin A1c in 3 months or see him back sooner if his blood sugar continues to drop this is a chronic problem  - POCT  A1C    2. Body mass index (BMI) 38.0-38.9, adult  Patient feels that a lot his weight is due to fluid retention since he is having problems with his kidneys along with his heart I would recommend he follow-up with his cardiologist along with his nephrologist.  I believe the last cardiology appointment was in April and at that time they were discussing his edema so he should follow-up with them.  This is a chronic problem    3. History of non-ST elevation myocardial infarction (NSTEMI)  Patient should follow-up with cardiologist this is a chronic problem    4. S/P transmetatarsal amputation of foot, right ([facility])  Patient should continue this monitoring if he sees any signs of infection he should follow-up this is a  chronic problem    5. Stage 3a chronic kidney disease ([facility])  Patient to follow-up with  nephrology as planned this is a chronic problem    6. Retinopathy  I did have patient's sign release for his records from his eye provider this is a chronic problem       Return in about 3 months (around 2/11/2023), or if symptoms worsen or fail to improve.    Please note that this dictation was created using voice recognition software. I have made every reasonable attempt to correct obvious errors, but I expect that there are errors of grammar and possibly content that I did not discover before finalizing the note.

## 2022-11-17 ENCOUNTER — HOSPITAL ENCOUNTER (INPATIENT)
Facility: MEDICAL CENTER | Age: 52
LOS: 6 days | DRG: 292 | End: 2022-11-23
Attending: EMERGENCY MEDICINE | Admitting: HOSPITALIST
Payer: MEDICARE

## 2022-11-17 ENCOUNTER — APPOINTMENT (OUTPATIENT)
Dept: RADIOLOGY | Facility: MEDICAL CENTER | Age: 52
DRG: 292 | End: 2022-11-17
Attending: EMERGENCY MEDICINE
Payer: MEDICARE

## 2022-11-17 DIAGNOSIS — R06.2 WHEEZING: ICD-10-CM

## 2022-11-17 DIAGNOSIS — R06.02 SOB (SHORTNESS OF BREATH): ICD-10-CM

## 2022-11-17 DIAGNOSIS — R53.1 GENERALIZED WEAKNESS: ICD-10-CM

## 2022-11-17 DIAGNOSIS — R79.89 ELEVATED TROPONIN: ICD-10-CM

## 2022-11-17 DIAGNOSIS — J90 PLEURAL EFFUSION: ICD-10-CM

## 2022-11-17 DIAGNOSIS — N18.31 STAGE 3A CHRONIC KIDNEY DISEASE: ICD-10-CM

## 2022-11-17 PROBLEM — N28.9 RENAL INSUFFICIENCY: Status: ACTIVE | Noted: 2022-11-17

## 2022-11-17 PROBLEM — I50.9 ACUTE HEART FAILURE, UNSPECIFIED HEART FAILURE TYPE (HCC): Status: ACTIVE | Noted: 2022-11-17

## 2022-11-17 PROBLEM — E88.09 SERUM ALBUMIN DECREASED: Status: ACTIVE | Noted: 2022-11-17

## 2022-11-17 LAB
ALBUMIN SERPL BCP-MCNC: 2.2 G/DL (ref 3.2–4.9)
ALBUMIN/GLOB SERPL: 0.9 G/DL
ALP SERPL-CCNC: 70 U/L (ref 30–99)
ALT SERPL-CCNC: 14 U/L (ref 2–50)
ANION GAP SERPL CALC-SCNC: 7 MMOL/L (ref 7–16)
AST SERPL-CCNC: 29 U/L (ref 12–45)
BASOPHILS # BLD AUTO: 0.8 % (ref 0–1.8)
BASOPHILS # BLD: 0.07 K/UL (ref 0–0.12)
BILIRUB SERPL-MCNC: 1.3 MG/DL (ref 0.1–1.5)
BUN SERPL-MCNC: 27 MG/DL (ref 8–22)
CALCIUM SERPL-MCNC: 7.6 MG/DL (ref 8.5–10.5)
CHLORIDE SERPL-SCNC: 109 MMOL/L (ref 96–112)
CO2 SERPL-SCNC: 23 MMOL/L (ref 20–33)
CREAT SERPL-MCNC: 1.64 MG/DL (ref 0.5–1.4)
EKG IMPRESSION: NORMAL
EOSINOPHIL # BLD AUTO: 0.17 K/UL (ref 0–0.51)
EOSINOPHIL NFR BLD: 1.8 % (ref 0–6.9)
ERYTHROCYTE [DISTWIDTH] IN BLOOD BY AUTOMATED COUNT: 40.4 FL (ref 35.9–50)
GFR SERPLBLD CREATININE-BSD FMLA CKD-EPI: 49 ML/MIN/1.73 M 2
GLOBULIN SER CALC-MCNC: 2.5 G/DL (ref 1.9–3.5)
GLUCOSE BLD STRIP.AUTO-MCNC: 104 MG/DL (ref 65–99)
GLUCOSE BLD STRIP.AUTO-MCNC: 79 MG/DL (ref 65–99)
GLUCOSE BLD STRIP.AUTO-MCNC: 82 MG/DL (ref 65–99)
GLUCOSE BLD STRIP.AUTO-MCNC: 93 MG/DL (ref 65–99)
GLUCOSE SERPL-MCNC: 73 MG/DL (ref 65–99)
HCT VFR BLD AUTO: 32.6 % (ref 42–52)
HGB BLD-MCNC: 11.3 G/DL (ref 14–18)
IMM GRANULOCYTES # BLD AUTO: 0.04 K/UL (ref 0–0.11)
IMM GRANULOCYTES NFR BLD AUTO: 0.4 % (ref 0–0.9)
LYMPHOCYTES # BLD AUTO: 2.57 K/UL (ref 1–4.8)
LYMPHOCYTES NFR BLD: 27.8 % (ref 22–41)
MCH RBC QN AUTO: 30 PG (ref 27–33)
MCHC RBC AUTO-ENTMCNC: 34.7 G/DL (ref 33.7–35.3)
MCV RBC AUTO: 86.5 FL (ref 81.4–97.8)
MONOCYTES # BLD AUTO: 0.47 K/UL (ref 0–0.85)
MONOCYTES NFR BLD AUTO: 5.1 % (ref 0–13.4)
NEUTROPHILS # BLD AUTO: 5.92 K/UL (ref 1.82–7.42)
NEUTROPHILS NFR BLD: 64.1 % (ref 44–72)
NRBC # BLD AUTO: 0 K/UL
NRBC BLD-RTO: 0 /100 WBC
NT-PROBNP SERPL IA-MCNC: 970 PG/ML (ref 0–125)
PLATELET # BLD AUTO: 224 K/UL (ref 164–446)
PMV BLD AUTO: 10.1 FL (ref 9–12.9)
POTASSIUM SERPL-SCNC: 3.5 MMOL/L (ref 3.6–5.5)
PROT SERPL-MCNC: 4.7 G/DL (ref 6–8.2)
RBC # BLD AUTO: 3.77 M/UL (ref 4.7–6.1)
SODIUM SERPL-SCNC: 139 MMOL/L (ref 135–145)
T4 FREE SERPL-MCNC: 1.31 NG/DL (ref 0.93–1.7)
T4 FREE SERPL-MCNC: 1.57 NG/DL (ref 0.93–1.7)
TROPONIN T SERPL-MCNC: 101 NG/L (ref 6–19)
TROPONIN T SERPL-MCNC: 109 NG/L (ref 6–19)
TROPONIN T SERPL-MCNC: 95 NG/L (ref 6–19)
TSH SERPL DL<=0.005 MIU/L-ACNC: 6.69 UIU/ML (ref 0.38–5.33)
TSH SERPL DL<=0.005 MIU/L-ACNC: 7.28 UIU/ML (ref 0.38–5.33)
WBC # BLD AUTO: 9.2 K/UL (ref 4.8–10.8)

## 2022-11-17 PROCEDURE — 71045 X-RAY EXAM CHEST 1 VIEW: CPT

## 2022-11-17 PROCEDURE — 0240U HCHG SARS-COV-2 COVID-19 NFCT DS RESP RNA 3 TRGT MIC: CPT

## 2022-11-17 PROCEDURE — 82962 GLUCOSE BLOOD TEST: CPT

## 2022-11-17 PROCEDURE — 93005 ELECTROCARDIOGRAM TRACING: CPT | Performed by: EMERGENCY MEDICINE

## 2022-11-17 PROCEDURE — 700111 HCHG RX REV CODE 636 W/ 250 OVERRIDE (IP): Performed by: HOSPITALIST

## 2022-11-17 PROCEDURE — 700102 HCHG RX REV CODE 250 W/ 637 OVERRIDE(OP): Performed by: HOSPITALIST

## 2022-11-17 PROCEDURE — 99223 1ST HOSP IP/OBS HIGH 75: CPT | Mod: AI | Performed by: HOSPITALIST

## 2022-11-17 PROCEDURE — 84484 ASSAY OF TROPONIN QUANT: CPT

## 2022-11-17 PROCEDURE — 83880 ASSAY OF NATRIURETIC PEPTIDE: CPT

## 2022-11-17 PROCEDURE — 94640 AIRWAY INHALATION TREATMENT: CPT

## 2022-11-17 PROCEDURE — 81001 URINALYSIS AUTO W/SCOPE: CPT

## 2022-11-17 PROCEDURE — A9270 NON-COVERED ITEM OR SERVICE: HCPCS | Performed by: HOSPITALIST

## 2022-11-17 PROCEDURE — 99285 EMERGENCY DEPT VISIT HI MDM: CPT

## 2022-11-17 PROCEDURE — 84443 ASSAY THYROID STIM HORMONE: CPT

## 2022-11-17 PROCEDURE — 70450 CT HEAD/BRAIN W/O DYE: CPT

## 2022-11-17 PROCEDURE — 36415 COLL VENOUS BLD VENIPUNCTURE: CPT

## 2022-11-17 PROCEDURE — 80053 COMPREHEN METABOLIC PANEL: CPT

## 2022-11-17 PROCEDURE — 85025 COMPLETE CBC W/AUTO DIFF WBC: CPT

## 2022-11-17 PROCEDURE — 84439 ASSAY OF FREE THYROXINE: CPT | Mod: 91

## 2022-11-17 PROCEDURE — 770020 HCHG ROOM/CARE - TELE (206)

## 2022-11-17 PROCEDURE — 700101 HCHG RX REV CODE 250: Performed by: EMERGENCY MEDICINE

## 2022-11-17 RX ORDER — ATORVASTATIN CALCIUM 80 MG/1
80 TABLET, FILM COATED ORAL EVERY EVENING
Status: DISCONTINUED | OUTPATIENT
Start: 2022-11-17 | End: 2022-11-23 | Stop reason: HOSPADM

## 2022-11-17 RX ORDER — CHOLESTYRAMINE LIGHT 4 G/5.7G
4 POWDER, FOR SUSPENSION ORAL 2 TIMES DAILY
Status: DISCONTINUED | OUTPATIENT
Start: 2022-11-17 | End: 2022-11-23 | Stop reason: HOSPADM

## 2022-11-17 RX ORDER — CARVEDILOL 12.5 MG/1
12.5 TABLET ORAL 2 TIMES DAILY WITH MEALS
Status: DISCONTINUED | OUTPATIENT
Start: 2022-11-17 | End: 2022-11-23 | Stop reason: HOSPADM

## 2022-11-17 RX ORDER — LABETALOL HYDROCHLORIDE 5 MG/ML
10 INJECTION, SOLUTION INTRAVENOUS EVERY 4 HOURS PRN
Status: DISCONTINUED | OUTPATIENT
Start: 2022-11-17 | End: 2022-11-23 | Stop reason: HOSPADM

## 2022-11-17 RX ORDER — ENALAPRIL MALEATE 10 MG/1
20 TABLET ORAL 2 TIMES DAILY
Status: DISCONTINUED | OUTPATIENT
Start: 2022-11-17 | End: 2022-11-21

## 2022-11-17 RX ORDER — IPRATROPIUM BROMIDE AND ALBUTEROL SULFATE 2.5; .5 MG/3ML; MG/3ML
3 SOLUTION RESPIRATORY (INHALATION)
Status: DISCONTINUED | OUTPATIENT
Start: 2022-11-17 | End: 2022-11-19

## 2022-11-17 RX ORDER — ONDANSETRON 4 MG/1
4 TABLET, ORALLY DISINTEGRATING ORAL EVERY 4 HOURS PRN
Status: DISCONTINUED | OUTPATIENT
Start: 2022-11-17 | End: 2022-11-23 | Stop reason: HOSPADM

## 2022-11-17 RX ORDER — POLYETHYLENE GLYCOL 3350 17 G/17G
1 POWDER, FOR SOLUTION ORAL
Status: DISCONTINUED | OUTPATIENT
Start: 2022-11-17 | End: 2022-11-23 | Stop reason: HOSPADM

## 2022-11-17 RX ORDER — PROCHLORPERAZINE EDISYLATE 5 MG/ML
5-10 INJECTION INTRAMUSCULAR; INTRAVENOUS EVERY 4 HOURS PRN
Status: DISCONTINUED | OUTPATIENT
Start: 2022-11-17 | End: 2022-11-23 | Stop reason: HOSPADM

## 2022-11-17 RX ORDER — HEPARIN SODIUM 5000 [USP'U]/ML
5000 INJECTION, SOLUTION INTRAVENOUS; SUBCUTANEOUS EVERY 8 HOURS
Status: DISCONTINUED | OUTPATIENT
Start: 2022-11-17 | End: 2022-11-23 | Stop reason: HOSPADM

## 2022-11-17 RX ORDER — BISACODYL 10 MG
10 SUPPOSITORY, RECTAL RECTAL
Status: DISCONTINUED | OUTPATIENT
Start: 2022-11-17 | End: 2022-11-23 | Stop reason: HOSPADM

## 2022-11-17 RX ORDER — FUROSEMIDE 10 MG/ML
40 INJECTION INTRAMUSCULAR; INTRAVENOUS
Status: DISCONTINUED | OUTPATIENT
Start: 2022-11-17 | End: 2022-11-21

## 2022-11-17 RX ORDER — POTASSIUM CHLORIDE 20 MEQ/1
20 TABLET, EXTENDED RELEASE ORAL 2 TIMES DAILY
Status: DISCONTINUED | OUTPATIENT
Start: 2022-11-17 | End: 2022-11-23 | Stop reason: HOSPADM

## 2022-11-17 RX ORDER — ENALAPRIL MALEATE 20 MG/1
20 TABLET ORAL 2 TIMES DAILY
Qty: 180 TABLET | Refills: 0 | Status: SHIPPED | OUTPATIENT
Start: 2022-11-17 | End: 2022-12-07 | Stop reason: SDUPTHER

## 2022-11-17 RX ORDER — ACETAMINOPHEN 325 MG/1
650 TABLET ORAL EVERY 6 HOURS PRN
Status: DISCONTINUED | OUTPATIENT
Start: 2022-11-17 | End: 2022-11-23 | Stop reason: HOSPADM

## 2022-11-17 RX ORDER — ONDANSETRON 2 MG/ML
4 INJECTION INTRAMUSCULAR; INTRAVENOUS EVERY 4 HOURS PRN
Status: DISCONTINUED | OUTPATIENT
Start: 2022-11-17 | End: 2022-11-23 | Stop reason: HOSPADM

## 2022-11-17 RX ORDER — CARVEDILOL 12.5 MG/1
12.5 TABLET ORAL 2 TIMES DAILY WITH MEALS
Qty: 180 TABLET | Refills: 0 | OUTPATIENT
Start: 2022-11-17

## 2022-11-17 RX ORDER — AMOXICILLIN 250 MG
2 CAPSULE ORAL 2 TIMES DAILY
Status: DISCONTINUED | OUTPATIENT
Start: 2022-11-17 | End: 2022-11-23 | Stop reason: HOSPADM

## 2022-11-17 RX ORDER — ENALAPRIL MALEATE 20 MG/1
20 TABLET ORAL 2 TIMES DAILY
Qty: 180 TABLET | Refills: 0 | OUTPATIENT
Start: 2022-11-17

## 2022-11-17 RX ORDER — AMLODIPINE BESYLATE 5 MG/1
10 TABLET ORAL DAILY
Qty: 180 TABLET | Refills: 0 | OUTPATIENT
Start: 2022-11-17

## 2022-11-17 RX ORDER — AMLODIPINE BESYLATE 5 MG/1
5 TABLET ORAL 2 TIMES DAILY
Status: DISCONTINUED | OUTPATIENT
Start: 2022-11-17 | End: 2022-11-20

## 2022-11-17 RX ORDER — PROMETHAZINE HYDROCHLORIDE 25 MG/1
12.5-25 SUPPOSITORY RECTAL EVERY 4 HOURS PRN
Status: DISCONTINUED | OUTPATIENT
Start: 2022-11-17 | End: 2022-11-23 | Stop reason: HOSPADM

## 2022-11-17 RX ORDER — AMLODIPINE BESYLATE 5 MG/1
10 TABLET ORAL DAILY
Qty: 180 TABLET | Refills: 0 | Status: SHIPPED | OUTPATIENT
Start: 2022-11-17 | End: 2022-11-17

## 2022-11-17 RX ORDER — OMEPRAZOLE 20 MG/1
20 CAPSULE, DELAYED RELEASE ORAL EVERY MORNING
Status: DISCONTINUED | OUTPATIENT
Start: 2022-11-18 | End: 2022-11-23 | Stop reason: HOSPADM

## 2022-11-17 RX ORDER — INSULIN GLARGINE 100 [IU]/ML
10-20 INJECTION, SOLUTION SUBCUTANEOUS EVERY EVENING
Status: ON HOLD | COMMUNITY
End: 2022-12-04 | Stop reason: SDUPTHER

## 2022-11-17 RX ORDER — CARVEDILOL 12.5 MG/1
12.5 TABLET ORAL 2 TIMES DAILY WITH MEALS
Qty: 180 TABLET | Refills: 0 | Status: SHIPPED | OUTPATIENT
Start: 2022-11-17 | End: 2022-12-07 | Stop reason: SDUPTHER

## 2022-11-17 RX ORDER — AMLODIPINE BESYLATE 5 MG/1
5 TABLET ORAL 2 TIMES DAILY
Status: ON HOLD | COMMUNITY
End: 2022-11-23

## 2022-11-17 RX ORDER — PROMETHAZINE HYDROCHLORIDE 25 MG/1
12.5-25 TABLET ORAL EVERY 4 HOURS PRN
Status: DISCONTINUED | OUTPATIENT
Start: 2022-11-17 | End: 2022-11-23 | Stop reason: HOSPADM

## 2022-11-17 RX ADMIN — POTASSIUM CHLORIDE 20 MEQ: 1500 TABLET, EXTENDED RELEASE ORAL at 17:21

## 2022-11-17 RX ADMIN — CHOLESTYRAMINE 4 G: 4 POWDER, FOR SUSPENSION ORAL at 19:12

## 2022-11-17 RX ADMIN — AMLODIPINE BESYLATE 5 MG: 5 TABLET ORAL at 17:21

## 2022-11-17 RX ADMIN — ENALAPRIL MALEATE 20 MG: 10 TABLET ORAL at 17:21

## 2022-11-17 RX ADMIN — HEPARIN SODIUM 5000 UNITS: 5000 INJECTION, SOLUTION INTRAVENOUS; SUBCUTANEOUS at 21:13

## 2022-11-17 RX ADMIN — ATORVASTATIN CALCIUM 80 MG: 80 TABLET, FILM COATED ORAL at 17:21

## 2022-11-17 RX ADMIN — CARVEDILOL 12.5 MG: 12.5 TABLET, FILM COATED ORAL at 17:21

## 2022-11-17 RX ADMIN — FUROSEMIDE 40 MG: 10 INJECTION, SOLUTION INTRAMUSCULAR; INTRAVENOUS at 17:21

## 2022-11-17 RX ADMIN — ACETAMINOPHEN 650 MG: 325 TABLET, FILM COATED ORAL at 21:13

## 2022-11-17 RX ADMIN — IPRATROPIUM BROMIDE AND ALBUTEROL SULFATE 3 ML: .5; 2.5 SOLUTION RESPIRATORY (INHALATION) at 13:44

## 2022-11-17 ASSESSMENT — LIFESTYLE VARIABLES
SUBSTANCE_ABUSE: 0
TOTAL SCORE: 0
EVER FELT BAD OR GUILTY ABOUT YOUR DRINKING: NO
CONSUMPTION TOTAL: NEGATIVE
HOW MANY TIMES IN THE PAST YEAR HAVE YOU HAD 5 OR MORE DRINKS IN A DAY: 0
ON A TYPICAL DAY WHEN YOU DRINK ALCOHOL HOW MANY DRINKS DO YOU HAVE: 0
HAVE YOU EVER FELT YOU SHOULD CUT DOWN ON YOUR DRINKING: NO
HAVE PEOPLE ANNOYED YOU BY CRITICIZING YOUR DRINKING: NO
EVER HAD A DRINK FIRST THING IN THE MORNING TO STEADY YOUR NERVES TO GET RID OF A HANGOVER: NO
ALCOHOL_USE: NO
AVERAGE NUMBER OF DAYS PER WEEK YOU HAVE A DRINK CONTAINING ALCOHOL: 0
DOES PATIENT WANT TO STOP DRINKING: NO
TOTAL SCORE: 0
TOTAL SCORE: 0

## 2022-11-17 ASSESSMENT — ENCOUNTER SYMPTOMS
SHORTNESS OF BREATH: 1
NAUSEA: 0
BACK PAIN: 0
CHILLS: 0
HEADACHES: 0
FEVER: 0
BLOOD IN STOOL: 0
PALPITATIONS: 0
COUGH: 1
STRIDOR: 0
DIZZINESS: 0
SPUTUM PRODUCTION: 0
EYE DISCHARGE: 0
NERVOUS/ANXIOUS: 0
WEIGHT LOSS: 0
SPEECH CHANGE: 0
DIARRHEA: 0
ABDOMINAL PAIN: 0
VOMITING: 0

## 2022-11-17 ASSESSMENT — COGNITIVE AND FUNCTIONAL STATUS - GENERAL
DAILY ACTIVITIY SCORE: 24
SUGGESTED CMS G CODE MODIFIER MOBILITY: CK
MOBILITY SCORE: 18
CLIMB 3 TO 5 STEPS WITH RAILING: A LITTLE
SUGGESTED CMS G CODE MODIFIER DAILY ACTIVITY: CH
STANDING UP FROM CHAIR USING ARMS: A LITTLE
MOVING FROM LYING ON BACK TO SITTING ON SIDE OF FLAT BED: A LITTLE
WALKING IN HOSPITAL ROOM: A LITTLE
TURNING FROM BACK TO SIDE WHILE IN FLAT BAD: A LITTLE
MOVING TO AND FROM BED TO CHAIR: A LITTLE

## 2022-11-17 ASSESSMENT — FIBROSIS 4 INDEX
FIB4 SCORE: 1.87
FIB4 SCORE: 1.988269693544868638

## 2022-11-17 ASSESSMENT — PAIN DESCRIPTION - PAIN TYPE: TYPE: ACUTE PAIN

## 2022-11-17 ASSESSMENT — PATIENT HEALTH QUESTIONNAIRE - PHQ9
SUM OF ALL RESPONSES TO PHQ9 QUESTIONS 1 AND 2: 0
2. FEELING DOWN, DEPRESSED, IRRITABLE, OR HOPELESS: NOT AT ALL
1. LITTLE INTEREST OR PLEASURE IN DOING THINGS: NOT AT ALL

## 2022-11-17 NOTE — ED TRIAGE NOTES
Beni Moore  52 y.o. male  Chief Complaint   Patient presents with    T-5000     Fell and hit head on hard wood floor at 0915 today. +81 mg ASA daily. -LOC, -head trauma, -dizziness. FSBG 80, OJ provided. Hx T2 DM.     Shortness of Breath     Onset this am. Hx MI, no CHF dx. BLE pitting edema. EMS medicated patient with 40 mg IV lasix PTA.      Pt BIB EMS for above complaint. BLE pitting edema.    EMS medicated patient with 40 mg lasix    FSBG 80.     Pt is GCS 15, speaking in full sentences, follows commands and responds appropriately to questions. Resp are even and unlabored. Patient denies pain.     Hypoglycemia and SOB protocol ordered.     This RN masked and in appropriate PPE during encounter.     Vitals:    11/17/22 1240   BP: 119/65   Pulse: 64   Resp: 20   Temp: (!) 35.6 °C (96 °F)   SpO2: 94%

## 2022-11-17 NOTE — ED NOTES
Lab called with critical result of troponin 109 at 1527. Critical lab result read back to lab.   Dr. Crowell notified of critical lab result at 1528.

## 2022-11-17 NOTE — ED NOTES
Rounded on patient. Patient resting in bed. No signs of distress noted. Equal chest rise and fall. No further needs at this time. Call light within reach. Family at bedside.

## 2022-11-17 NOTE — ED NOTES
Med Rec complete per patient with rx bottles @ bedside  No oral antibiotics in the last 30 days   Allergies reviewed

## 2022-11-17 NOTE — ED PROVIDER NOTES
"ED Provider Note    CHIEF COMPLAINT  Chief Complaint   Patient presents with    T-5000     Fell and hit head on hard wood floor at 0915 today. +81 mg ASA daily. -LOC, -head trauma, -dizziness. FSBG 80, OJ provided. Hx T2 DM.     Shortness of Breath     Onset this am. Hx MI, no CHF dx. BLE pitting edema. EMS medicated patient with 40 mg IV lasix PTA.        HPI  Beni Moore is a 52 y.o. male who presents for evaluation of generalized weakness, shortness of breath with a ground-level fall and head injury.  He also has been hypoglycemic.  The patient states that over the past few weeks he has just felt generally weak.  Today, after he woke up he felt very weak, he checked his sugar and noted to be quite low.  He drank a soda and had some sugar rolls.  Short while later he felt very weak and fell striking his head.  He does take aspirin on a daily basis.  States that he was too weak to get off the floor.  He laid there for about an hour before calling his parents and they help get him on the floor with the assistance of EMS.  He has been short of breath today, states that he has had generalized \"edema\" since February and has been evaluated by his PCP multiple times and has been referred to his cardiologist.  EMS treated him with some IV Lasix on the way here.  No pain in his chest.  No unilateral weakness or numbness.  No abdominal pain or vomiting.  No other acute complaints.  His past medical history is significant for diabetes hypertension and hyperlipidemia as well as CAD status post stenting    REVIEW OF SYSTEMS  Negative for fever, rash, chest pain, abdominal pain, nausea, vomiting, diarrhea, headache, focal weakness, focal numbness, focal tingling, back pain. All other systems are negative.     PAST MEDICAL HISTORY  Past Medical History:   Diagnosis Date    Anesthesia     Hypotension post-op, persisted for a few months    Bowel habit changes     History GI problems    Cataract     IOL - Left eye    Diabetes " (HCC)     Oral medications & insulin    Heart burn     GERD; takes Prilosec    Hemorrhagic disorder (HCC)     ASA protection for stent and cardiac history    Hx of heart artery stent 2019    Hyperlipidemia     Hypertension        FAMILY HISTORY  Family History   Problem Relation Age of Onset    No Known Problems Mother     Diabetes Father     Heart Disease Father     Diabetes Maternal Grandmother     Heart Disease Maternal Grandfather     Lung Disease Paternal Grandmother     Cancer Paternal Grandmother     Cancer Paternal Grandfather     Lung Cancer Paternal Grandfather     Kidney Disease Neg Hx        SOCIAL HISTORY  Social History     Tobacco Use    Smoking status: Former     Packs/day: 0.50     Years: 10.00     Pack years: 5.00     Types: Cigarettes     Quit date:      Years since quittin.8    Smokeless tobacco: Never   Vaping Use    Vaping Use: Never used   Substance Use Topics    Alcohol use: No    Drug use: Not Currently     Types: Marijuana, Oral     Comment: CBD Edibles 3-4 times per week       SURGICAL HISTORY  Past Surgical History:   Procedure Laterality Date    TOE AMPUTATION Left 2021    Procedure: AMPUTATION, TOE;  Surgeon: Tomer Hart M.D.;  Location: [facility];  Service: Orthopedics    STENT PLACEMENT  2019    STEMI with nonobstructive LAD    TOE AMPUTATION Right 2018    Procedure: Transmetatarsal amputation;  Surgeon: Ravi Bernardo M.D.;  Location: [facility];  Service: Orthopedics    ACHILLES TENDON REPAIR Right 2018    Procedure: ACHILLES LENGTHENING;  Surgeon: Ravi Bernardo M.D.;  Location: [facility];  Service: Orthopedics    IRRIGATION & DEBRIDEMENT ORTHO Right 2018    Procedure: IRRIGATION & DEBRIDEMENT ORTHO;  Surgeon: Ravi Bernardo M.D.;  Location: [facility];  Service: Orthopedics    OTHER Left     IOL    OTHER ABDOMINAL SURGERY      OTHER ORTHOPEDIC SURGERY      TONSILLECTOMY      VASECTOMY          CURRENT MEDICATIONS  I personally reviewed the medication list in the charting documentation.     ALLERGIES  No Known Allergies    MEDICAL RECORD  I have reviewed patient's medical record and pertinent results are listed above.      PHYSICAL EXAM  VITAL SIGNS: /65   Pulse 64   Temp (!) 35.6 °C (96 °F) (Temporal)   Resp 20   Ht 1.829 m (6')   Wt (!) 129 kg (284 lb)   SpO2 94%   BMI 38.52 kg/m²    Constitutional: Well appearing patient in no acute distress.  Awake and alert, not toxic nor ill in appearance.  HENT: Normocephalic, no obvious evidence of acute trauma.  Eyes: No scleral icterus. Normal conjunctiva   Neck: Comfortable movement without any obvious restriction in the range of motion.  Cardiovascular: Upon ascultation I appreciate a regular heart rhythm and a normal rate.   Thorax & Lungs: Increased respiratory effort with audible expiratory wheezing.  On auscultation has wheezing throughout all lung shi.  Bibasilar crackles.  Abdomen: The abdomen is not visibly distended. Upon palpation, I find it to be without tenderness.  No mass appreciated.  Skin: The exposed portions of skin reveal no obvious rash or other abnormalities.  Extremities/Musculoskeletal: Symmetric 2+ lower extremity pitting edema.  No asymmetry.  No obvious tenderness.  Neurologic: Alert & oriented. No focal deficits observed.   Psychiatric: Normal affect appropriate for the clinical situation.    DIAGNOSTIC STUDIES / PROCEDURES    LABS/EKGs     Results for orders placed or performed during the hospital encounter of 11/17/22   CBC with Differential   Result Value Ref Range    WBC 9.2 4.8 - 10.8 K/uL    RBC 3.77 (L) 4.70 - 6.10 M/uL    Hemoglobin 11.3 (L) 14.0 - 18.0 g/dL    Hematocrit 32.6 (L) 42.0 - 52.0 %    MCV 86.5 81.4 - 97.8 fL    MCH 30.0 27.0 - 33.0 pg    MCHC 34.7 33.7 - 35.3 g/dL    RDW 40.4 35.9 - 50.0 fL    Platelet Count 224 164 - 446 K/uL    MPV 10.1 9.0 - 12.9 fL    Neutrophils-Polys 64.10 44.00 - 72.00  %    Lymphocytes 27.80 22.00 - 41.00 %    Monocytes 5.10 0.00 - 13.40 %    Eosinophils 1.80 0.00 - 6.90 %    Basophils 0.80 0.00 - 1.80 %    Immature Granulocytes 0.40 0.00 - 0.90 %    Nucleated RBC 0.00 /100 WBC    Neutrophils (Absolute) 5.92 1.82 - 7.42 K/uL    Lymphs (Absolute) 2.57 1.00 - 4.80 K/uL    Monos (Absolute) 0.47 0.00 - 0.85 K/uL    Eos (Absolute) 0.17 0.00 - 0.51 K/uL    Baso (Absolute) 0.07 0.00 - 0.12 K/uL    Immature Granulocytes (abs) 0.04 0.00 - 0.11 K/uL    NRBC (Absolute) 0.00 K/uL   Comp Metabolic Panel   Result Value Ref Range    Sodium 139 135 - 145 mmol/L    Potassium 3.5 (L) 3.6 - 5.5 mmol/L    Chloride 109 96 - 112 mmol/L    Co2 23 20 - 33 mmol/L    Anion Gap 7.0 7.0 - 16.0    Glucose 73 65 - 99 mg/dL    Bun 27 (H) 8 - 22 mg/dL    Creatinine 1.64 (H) 0.50 - 1.40 mg/dL    Calcium 7.6 (L) 8.5 - 10.5 mg/dL    AST(SGOT) 29 12 - 45 U/L    ALT(SGPT) 14 2 - 50 U/L    Alkaline Phosphatase 70 30 - 99 U/L    Total Bilirubin 1.3 0.1 - 1.5 mg/dL    Albumin 2.2 (L) 3.2 - 4.9 g/dL    Total Protein 4.7 (L) 6.0 - 8.2 g/dL    Globulin 2.5 1.9 - 3.5 g/dL    A-G Ratio 0.9 g/dL   ESTIMATED GFR   Result Value Ref Range    GFR (CKD-EPI) 49 (A) >60 mL/min/1.73 m 2   TROPONIN   Result Value Ref Range    Troponin T 109 (H) 6 - 19 ng/L   proBrain Natriuretic Peptide, NT   Result Value Ref Range    NT-proBNP 970 (H) 0 - 125 pg/mL     Results for orders placed or performed during the hospital encounter of 22   EKG   Result Value Ref Range    Report       [facility] Emergency Dept.    Test Date:  2022  Pt Name:    HELIO BARROW                     Department: ER  MRN:        [id number]                      Room:       T820  Gender:     Male                         Technician: [id number]  :        1970                   Requested By:ER TRIAGE PROTOCOL  Order #:    [id number]                    Reading MD: KIESHA MELENDEZ MD    Measurements  Intervals                                 Axis  Rate:       70                           P:          14  VA:         105                          QRS:        73  QRSD:       128                          T:          12  QT:         473  QTc:        511    Interpretive Statements  12 Lead EKG interpreted by me to show: -- Rate 70 -- Rhythm: Normal sinus  rhythm  -- Axis: Normal -- VA and QRS Intervals: NICD -- T waves: No acute changes --  ST  segments: No acute changes -- Ectopy: PAC. My impression of this EKG: Does  not  indicate acute ischemia at this time.  Electronically Lianet d On 11- 20:06:56 PST by KIESHA MELENDEZ MD        RADIOLOGY     CT-HEAD W/O   Final Result      Head CT without contrast within normal limits. No evidence of acute cerebral infarction, hemorrhage or mass lesion.         DX-CHEST-PORTABLE (1 VIEW)   Final Result      1.  Small right-sided pleural effusion and right lateral pleural thickening.      2.  Faint opacification throughout the right lower hemithorax consistent with diffuse pleural thickening, atelectasis, and/or pneumonitis.      EC-ECHOCARDIOGRAM COMPLETE W/O CONT    (Results Pending)         COURSE & MEDICAL DECISION MAKING  I have reviewed any medical record information, laboratory studies and radiographic results as noted above.  Differential diagnoses includes: ACS, CHF, pleural effusion, pulmonary edema, ICH, dehydration, electrolyte abnormalities, anemia    Encounter Summary: This is a very pleasant 52 y.o. male who unfortunately required evaluation in the emergency department today with shortness of breath, generalized weakness and a ground-level fall, too weak to get up on his own.  Weakness has been for several weeks progressive, shortness of breath began just today.  On exam he is not tachycardic nor hypoxic but it does have increased respiratory effort with some expiratory wheezes.  Will be treated with a nebulizer.  Has evidence of peripheral edema as well as pulmonary examination concerning for  pleural effusion/pulmonary edema.  Blood work will be obtained including a BNP, chest x-ray as well.  He did strike his head, there is no evidence of acute trauma but he is weak generally and he is on aspirin so a head CT will be obtained ------- CT of the head is negative.  Chest x-ray reveals right-sided pleural effusion.  Blood work reveals a BNP of nearly 1000 with a troponin overall 100.  At this point, the patient has received diuresis via Lasix on the ambulance.  Given the elevated troponin and BNP he will be admitted to the hospital for further evaluation and care      DISPOSITION: Admit in guarded condition      FINAL IMPRESSION  1. SOB (shortness of breath)    2. Elevated troponin    3. Pleural effusion    4. Generalized weakness           This dictation was created using voice recognition software. The accuracy of the dictation is limited to the abilities of the software. I expect there may be some errors of grammar and possibly content. The nursing notes were reviewed and certain aspects of this information were incorporated into this note.    Electronically signed by: Ranjith Upton M.D., 11/17/2022 1:06 PM

## 2022-11-18 ENCOUNTER — APPOINTMENT (OUTPATIENT)
Dept: CARDIOLOGY | Facility: MEDICAL CENTER | Age: 52
DRG: 292 | End: 2022-11-18
Attending: HOSPITALIST
Payer: MEDICARE

## 2022-11-18 PROBLEM — R79.89 TROPONIN I ABOVE REFERENCE RANGE: Status: ACTIVE | Noted: 2022-11-18

## 2022-11-18 PROBLEM — R79.89 ELEVATED TROPONIN: Status: RESOLVED | Noted: 2021-08-19 | Resolved: 2022-11-18

## 2022-11-18 LAB
ANION GAP SERPL CALC-SCNC: 7 MMOL/L (ref 7–16)
APPEARANCE UR: CLEAR
BACTERIA #/AREA URNS HPF: NEGATIVE /HPF
BILIRUB UR QL STRIP.AUTO: NEGATIVE
BUN SERPL-MCNC: 26 MG/DL (ref 8–22)
CALCIUM SERPL-MCNC: 7.4 MG/DL (ref 8.5–10.5)
CHLORIDE SERPL-SCNC: 111 MMOL/L (ref 96–112)
CO2 SERPL-SCNC: 23 MMOL/L (ref 20–33)
COLOR UR: YELLOW
CREAT SERPL-MCNC: 1.68 MG/DL (ref 0.5–1.4)
EPI CELLS #/AREA URNS HPF: NEGATIVE /HPF
ERYTHROCYTE [DISTWIDTH] IN BLOOD BY AUTOMATED COUNT: 40.6 FL (ref 35.9–50)
FLUAV RNA SPEC QL NAA+PROBE: NEGATIVE
FLUBV RNA SPEC QL NAA+PROBE: NEGATIVE
GFR SERPLBLD CREATININE-BSD FMLA CKD-EPI: 48 ML/MIN/1.73 M 2
GLUCOSE BLD STRIP.AUTO-MCNC: 100 MG/DL (ref 65–99)
GLUCOSE BLD STRIP.AUTO-MCNC: 145 MG/DL (ref 65–99)
GLUCOSE BLD STRIP.AUTO-MCNC: 182 MG/DL (ref 65–99)
GLUCOSE BLD STRIP.AUTO-MCNC: 190 MG/DL (ref 65–99)
GLUCOSE SERPL-MCNC: 111 MG/DL (ref 65–99)
GLUCOSE UR STRIP.AUTO-MCNC: 100 MG/DL
HCT VFR BLD AUTO: 28.1 % (ref 42–52)
HGB BLD-MCNC: 9.8 G/DL (ref 14–18)
HYALINE CASTS #/AREA URNS LPF: ABNORMAL /LPF
KETONES UR STRIP.AUTO-MCNC: NEGATIVE MG/DL
LEUKOCYTE ESTERASE UR QL STRIP.AUTO: NEGATIVE
LV EJECT FRACT  99904: 60
LV EJECT FRACT MOD 4C 99902: 59.19
MCH RBC QN AUTO: 30.2 PG (ref 27–33)
MCHC RBC AUTO-ENTMCNC: 34.9 G/DL (ref 33.7–35.3)
MCV RBC AUTO: 86.7 FL (ref 81.4–97.8)
MICRO URNS: ABNORMAL
NITRITE UR QL STRIP.AUTO: NEGATIVE
NT-PROBNP SERPL IA-MCNC: 806 PG/ML (ref 0–125)
PH UR STRIP.AUTO: 5 [PH] (ref 5–8)
PLATELET # BLD AUTO: 179 K/UL (ref 164–446)
PMV BLD AUTO: 10.3 FL (ref 9–12.9)
POTASSIUM SERPL-SCNC: 3.2 MMOL/L (ref 3.6–5.5)
PROT UR QL STRIP: 300 MG/DL
RBC # BLD AUTO: 3.24 M/UL (ref 4.7–6.1)
RBC # URNS HPF: ABNORMAL /HPF
RBC UR QL AUTO: ABNORMAL
SARS-COV-2 RNA RESP QL NAA+PROBE: NOTDETECTED
SODIUM SERPL-SCNC: 141 MMOL/L (ref 135–145)
SP GR UR STRIP.AUTO: 1.01
SPECIMEN SOURCE: NORMAL
UROBILINOGEN UR STRIP.AUTO-MCNC: 0.2 MG/DL
WBC # BLD AUTO: 7.5 K/UL (ref 4.8–10.8)
WBC #/AREA URNS HPF: ABNORMAL /HPF

## 2022-11-18 PROCEDURE — 700102 HCHG RX REV CODE 250 W/ 637 OVERRIDE(OP): Performed by: HOSPITALIST

## 2022-11-18 PROCEDURE — 80048 BASIC METABOLIC PNL TOTAL CA: CPT

## 2022-11-18 PROCEDURE — 770020 HCHG ROOM/CARE - TELE (206)

## 2022-11-18 PROCEDURE — 99233 SBSQ HOSP IP/OBS HIGH 50: CPT | Performed by: STUDENT IN AN ORGANIZED HEALTH CARE EDUCATION/TRAINING PROGRAM

## 2022-11-18 PROCEDURE — 93306 TTE W/DOPPLER COMPLETE: CPT | Mod: 26 | Performed by: INTERNAL MEDICINE

## 2022-11-18 PROCEDURE — 36415 COLL VENOUS BLD VENIPUNCTURE: CPT

## 2022-11-18 PROCEDURE — 700111 HCHG RX REV CODE 636 W/ 250 OVERRIDE (IP): Performed by: HOSPITALIST

## 2022-11-18 PROCEDURE — A9270 NON-COVERED ITEM OR SERVICE: HCPCS | Performed by: HOSPITALIST

## 2022-11-18 PROCEDURE — 85027 COMPLETE CBC AUTOMATED: CPT

## 2022-11-18 PROCEDURE — 82962 GLUCOSE BLOOD TEST: CPT

## 2022-11-18 PROCEDURE — 93306 TTE W/DOPPLER COMPLETE: CPT

## 2022-11-18 PROCEDURE — 83880 ASSAY OF NATRIURETIC PEPTIDE: CPT

## 2022-11-18 RX ADMIN — FUROSEMIDE 40 MG: 10 INJECTION, SOLUTION INTRAMUSCULAR; INTRAVENOUS at 05:26

## 2022-11-18 RX ADMIN — ATORVASTATIN CALCIUM 80 MG: 80 TABLET, FILM COATED ORAL at 16:44

## 2022-11-18 RX ADMIN — POTASSIUM CHLORIDE 20 MEQ: 1500 TABLET, EXTENDED RELEASE ORAL at 16:44

## 2022-11-18 RX ADMIN — CARVEDILOL 12.5 MG: 12.5 TABLET, FILM COATED ORAL at 16:44

## 2022-11-18 RX ADMIN — HEPARIN SODIUM 5000 UNITS: 5000 INJECTION, SOLUTION INTRAVENOUS; SUBCUTANEOUS at 05:27

## 2022-11-18 RX ADMIN — FUROSEMIDE 40 MG: 10 INJECTION, SOLUTION INTRAMUSCULAR; INTRAVENOUS at 15:10

## 2022-11-18 RX ADMIN — SENNOSIDES AND DOCUSATE SODIUM 2 TABLET: 50; 8.6 TABLET ORAL at 05:27

## 2022-11-18 RX ADMIN — AMLODIPINE BESYLATE 5 MG: 5 TABLET ORAL at 16:44

## 2022-11-18 RX ADMIN — ENALAPRIL MALEATE 20 MG: 10 TABLET ORAL at 05:27

## 2022-11-18 RX ADMIN — CHOLESTYRAMINE 4 G: 4 POWDER, FOR SUSPENSION ORAL at 07:54

## 2022-11-18 RX ADMIN — CHOLESTYRAMINE 4 G: 4 POWDER, FOR SUSPENSION ORAL at 17:19

## 2022-11-18 RX ADMIN — OMEPRAZOLE 20 MG: 20 CAPSULE, DELAYED RELEASE ORAL at 05:27

## 2022-11-18 RX ADMIN — POTASSIUM CHLORIDE 20 MEQ: 1500 TABLET, EXTENDED RELEASE ORAL at 05:27

## 2022-11-18 RX ADMIN — ENALAPRIL MALEATE 20 MG: 10 TABLET ORAL at 16:44

## 2022-11-18 RX ADMIN — ASPIRIN 81 MG: 81 TABLET, COATED ORAL at 05:27

## 2022-11-18 RX ADMIN — HEPARIN SODIUM 5000 UNITS: 5000 INJECTION, SOLUTION INTRAVENOUS; SUBCUTANEOUS at 15:10

## 2022-11-18 RX ADMIN — HEPARIN SODIUM 5000 UNITS: 5000 INJECTION, SOLUTION INTRAVENOUS; SUBCUTANEOUS at 21:07

## 2022-11-18 RX ADMIN — INSULIN HUMAN 2 UNITS: 100 INJECTION, SOLUTION PARENTERAL at 21:05

## 2022-11-18 RX ADMIN — AMLODIPINE BESYLATE 5 MG: 5 TABLET ORAL at 05:27

## 2022-11-18 RX ADMIN — CARVEDILOL 12.5 MG: 12.5 TABLET, FILM COATED ORAL at 07:54

## 2022-11-18 RX ADMIN — INSULIN HUMAN 2 UNITS: 100 INJECTION, SOLUTION PARENTERAL at 16:46

## 2022-11-18 ASSESSMENT — ENCOUNTER SYMPTOMS
FEVER: 0
DIZZINESS: 0
SHORTNESS OF BREATH: 1
VOMITING: 0
NAUSEA: 0
BLURRED VISION: 0
COUGH: 0
BRUISES/BLEEDS EASILY: 0
MYALGIAS: 0

## 2022-11-18 ASSESSMENT — FIBROSIS 4 INDEX: FIB4 SCORE: 2.34

## 2022-11-18 NOTE — ASSESSMENT & PLAN NOTE
Medical management with aspirin 81mg daily, coreg 12.5mg BID, atorvastatin 80mg nightly.  Elevated troponin: 109 and will follow serial troponins x 3  EKG reviewed  Monitor on telemetry  Follows outpatient with Dr Andrew Engel

## 2022-11-18 NOTE — HEART FAILURE PROGRAM
Patient seen in cardiology clinic in April of this year as f/u for ACS s/p stent. Heart failure was not diagnosed at this visit.    Echocardiogram from 2021 is not overtly supportive of a HF diagnosis. Repeat echo is ordered and pending completion.     BMI 38  Albumin 2.2   GFR 49  Hgb 9.8        ADDENDUM 11/18/22 1539  Echocardiogram not overtly supportive of HF diagnosis. Reached out to DR. Sanders who said, he'll review. Replied back that he will review. Let him know that myself and the schedulers are not available over the weekend and if patient is to continue with a HF dx we need to make him an appointment prior to 1700 today. Respectfully requested expedited review.

## 2022-11-18 NOTE — PROGRESS NOTES
4 Eyes Skin Assessment Completed by JULIANO Ortega and JULIANO Fonseca.    Head WDL  Ears WDL  Nose WDL  Mouth WDL  Neck WDL  Breast/Chest WDL  Shoulder Blades WDL  Spine WDL  (R) Arm/Elbow/Hand WDL  (L) Arm/Elbow/Hand WDL  Abdomen WDL  Groin WDL  Scrotum/Coccyx/Buttocks WDL  (R) Leg Swelling  (L) Leg Swelling  (R) Heel/Foot/Toe WDL 5 toes amputated, healed  (L) Heel/Foot/Toe WDL 1 toe amputated healed          Devices In Places Tele Box      Interventions In Place Pressure Redistribution Mattress    Possible Skin Injury No    Pictures Uploaded Into Epic N/A  Wound Consult Placed N/A  RN Wound Prevention Protocol Ordered No

## 2022-11-18 NOTE — DISCHARGE PLANNING
TCN DCE chart review. Monitoring for developments in discharge disposition and will round back to obtain choice if needed/indicated. However given current functional status and 6 clicks score of 24 and 18, anticipate pt will dc to home with outpatient follow ups. Note TCN referred to [facility] for possible needs for dc planning as well. Thank you.

## 2022-11-18 NOTE — ED NOTES
Bedside report given to T820 JULIANO Valdivia. Patient transported with ACLS RN on Zoll monitor. All patient belongings and chart sent with patient. Patient care transferred. Receiving RN assuming care.

## 2022-11-18 NOTE — ASSESSMENT & PLAN NOTE
Creatinine trending down  Fluid restriction  Restart home enalapril   Switch to oral Lasix  Nephrology following, appreciate recommendations  Labs on a.m.

## 2022-11-18 NOTE — PROGRESS NOTES
Received report from JULIANO Choi (T8). Patient is A&Ox4. Patient transported via [facility] with ACLS RN and Zoll monitor. Patient arrived to unit, placed on tele box. Confirmed SR with monitor room. Patient slid from [facility] to hospital bed, tolerated well. Pt oriented to unit and call light, verbalized understanding. Fall precautions in place. Call light and belongings within reach. Bed locked and in lowest position. Assessment completed, will continue to monitor.

## 2022-11-18 NOTE — PROGRESS NOTES
Hospital Medicine Daily Progress Note    Date of Service  11/18/2022    Chief Complaint  Beni Moore is a 52 y.o. male admitted 11/17/2022 with shortness of breath    Hospital Course  Beni Moore is a 52 y.o. male with a history of diabetes, hypertension, dyslipidemia, bilateral toe amputations, coronary artery disease with a history of stent placed 2019, obesity, likely SOBIA, diabetic gastric issues who presented 11/17/2022 with lethargy and increasing weakness as well as 50 pound weight gain over the past year with leg edema.  Also reports of episode of hypoglycemia.  Chest x-ray consistent with volume overload, small right sided pleural effusion, diffuse pleural thickening, atelectasis.  Labs remarkable for elevated BNP, troponin trended flat on 101.  Noted AXEL.  EKG with nonspecific ST changes    Interval Problem Update  11/18/2022  Vitals remained stable.  Currently on room air saturating over 90  Labs reviewed.  Noted AXEL    Continue Lasix  Nephrology evaluation if kidney function continue to worsen(baseline creatinine around 1.4)    Pending echocardiogram        I have discussed this patient's plan of care and discharge plan at IDT rounds today with Case Management, Nursing, Nursing leadership, and other members of the IDT team.        Code Status  Full Code    Disposition  Patient is not medically cleared for discharge.   Anticipate discharge to to home with close outpatient follow-up.  I have placed the appropriate orders for post-discharge needs.    Review of Systems  Review of Systems   Constitutional:  Negative for fever.   HENT:  Negative for hearing loss.    Eyes:  Negative for blurred vision.   Respiratory:  Positive for shortness of breath. Negative for cough.    Cardiovascular:  Positive for leg swelling. Negative for chest pain.   Gastrointestinal:  Negative for nausea and vomiting.   Genitourinary:  Negative for dysuria.   Musculoskeletal:  Negative for myalgias.   Skin:  Negative for rash.    Neurological:  Negative for dizziness.   Endo/Heme/Allergies:  Does not bruise/bleed easily.      Physical Exam  Temp:  [35.6 °C (96 °F)-36.8 °C (98.2 °F)] 36.8 °C (98.2 °F)  Pulse:  [64-82] 79  Resp:  [13-20] 20  BP: (119-154)/(65-92) 154/79  SpO2:  [94 %-97 %] 95 %    Physical Exam  Constitutional:       General: He is not in acute distress.     Appearance: He is obese.   HENT:      Head: Normocephalic and atraumatic.      Right Ear: Tympanic membrane normal.      Left Ear: Tympanic membrane normal.      Nose: Nose normal.      Mouth/Throat:      Mouth: Mucous membranes are moist.   Eyes:      Extraocular Movements: Extraocular movements intact.      Pupils: Pupils are equal, round, and reactive to light.   Cardiovascular:      Rate and Rhythm: Normal rate and regular rhythm.      Pulses: Normal pulses.   Pulmonary:      Effort: Pulmonary effort is normal. No respiratory distress.   Abdominal:      General: Abdomen is flat. There is no distension.   Musculoskeletal:      Cervical back: Normal range of motion.      Right lower leg: Edema present.      Left lower leg: Edema present.      Comments: Right metatarsal  amputation    Skin:     General: Skin is warm.   Neurological:      General: No focal deficit present.      Mental Status: He is alert and oriented to person, place, and time. Mental status is at baseline.   Psychiatric:         Mood and Affect: Mood normal.       Fluids    Intake/Output Summary (Last 24 hours) at 11/18/2022 1012  Last data filed at 11/18/2022 0918  Gross per 24 hour   Intake 120 ml   Output 2150 ml   Net -2030 ml       Laboratory  Recent Labs     11/17/22  1254 11/18/22  0041   WBC 9.2 7.5   RBC 3.77* 3.24*   HEMOGLOBIN 11.3* 9.8*   HEMATOCRIT 32.6* 28.1*   MCV 86.5 86.7   MCH 30.0 30.2   MCHC 34.7 34.9   RDW 40.4 40.6   PLATELETCT 224 179   MPV 10.1 10.3     Recent Labs     11/17/22  1254 11/18/22  0041   SODIUM 139 141   POTASSIUM 3.5* 3.2*   CHLORIDE 109 111   CO2 23 23   GLUCOSE 73  111*   BUN 27* 26*   CREATININE 1.64* 1.68*   CALCIUM 7.6* 7.4*                   Imaging  CT-HEAD W/O   Final Result      Head CT without contrast within normal limits. No evidence of acute cerebral infarction, hemorrhage or mass lesion.         DX-CHEST-PORTABLE (1 VIEW)   Final Result      1.  Small right-sided pleural effusion and right lateral pleural thickening.      2.  Faint opacification throughout the right lower hemithorax consistent with diffuse pleural thickening, atelectasis, and/or pneumonitis.      EC-ECHOCARDIOGRAM COMPLETE W/O CONT    (Results Pending)        Assessment/Plan  * Acute heart failure, unspecified heart failure type (HCC)- (present on admission)  Assessment & Plan  BNP:970, troponin:109  Repeat Echocardiogrm.  Last echo 4/2022 with preserved EF  Low albumin:2.2   Lasix and aldactone as BP and renal function allows.    Troponin I above reference range  Assessment & Plan  Elevated troponin likely in setting of demand ischemia and due to underlying AXEL, EKG unremarkable  Unlikely ACS  Denies chest pain  Continue to trend troponin  Telemetry monitoring, monitor EKG  Get echocardiogram to evaluate wall motion abnormalities        Serum albumin decreased  Assessment & Plan  11/17 Alb:2.2  Check UA for proteinuria. Rule out nephrotic syndrome.    Renal insufficiency  Assessment & Plan  He has followed up with Dr Ortiz in the past for nephrology  Patient states his ocular medication was causing some renal dysfunction along with his diabeties  Monitor bmp, vitals, I/O's  May need nephrology    Body mass index (BMI) 38.0-38.9, adult- (present on admission)  Assessment & Plan  Body mass index is 38.52 kg/m².  Component of water weight from volume overload.  Monitor for euvolemic weight    Presence of stent in coronary artery- (present on admission)  Assessment & Plan  As per CAD management    Type 2 diabetes mellitus with hyperglycemia, with long-term current use of insulin (HCC)- (present on  admission)  Assessment & Plan  Per patient episode of hypoglycemia 11/17 am.  Hold trulicity, glargine and glimipiride  Monitor accuchecks and cover with SSI  11/11/22 A1c:5.5  Hypoglycemic protocol  Diabetic diet.  Repeat A1c      Coronary artery disease involving native coronary artery of native heart without angina pectoris- (present on admission)  Assessment & Plan  Medical management with aspirin 81mg daily, coreg 12.5mg BID, atorvastatin 80mg nightly.  Elevated troponin: 109 and will follow serial troponins x 3  EKG reviewed  Monitor on telemetry  Follows outpatient with Dr Andrew Engel    S/P transmetatarsal amputation of foot, right (HCC)- (present on admission)  Assessment & Plan  No acute wounds noted on bilateral feet       VTE prophylaxis: SCDs/TEDs and heparin ppx    I have performed a physical exam and reviewed and updated ROS and Plan today (11/18/2022). In review of yesterday's note (11/17/2022), there are no changes except as documented above.

## 2022-11-18 NOTE — ASSESSMENT & PLAN NOTE
Body mass index is 38.52 kg/m².  Component of water weight from volume overload.  Monitor for euvolemic weight

## 2022-11-18 NOTE — HEART FAILURE PROGRAM
Patient was diagnosed with heart failure with preserved ejection fraction.    Echo: not overtly supportive of a HF presentation    I have asked hospital schedulers to arrange a f/u at the HF Clinic with an MD and to please put in appointment notes that the visit is to confirm HFpEF diagnosis given by hospital medicine.    I have asked bedside nursing to please educate patient that they has been diagnosed with a type of HF (heart failure with preserved ejection fraction) and that this diagnosis is a challenging one because so many other conditions can mimick it.    In this patient's case, other confounding conditions are:     Albumin of 2.2  BMI of 38  GFR 49  Hgb 9.8    I've included in this order to educate the patient that the cardiology appointment we are scheduling at the Heart Failure Clinic for them is to confirm the HFpEF diagnosis, and as such, it is very important that they attend.    Patient should also be educated that they need to follow all guidelines in the Living Well With Heart Failure Patient Education Booklet until they are told by the HF Provider that it's not necessary.    Thank you, Stacy, Cardio RN Navigator [phone]

## 2022-11-18 NOTE — ASSESSMENT & PLAN NOTE
Per patient episode of hypoglycemia 11/17 am.  Hold trulicity, glargine and glimipiride  Monitor accuchecks and cover with SSI  11/11/22 A1c:5.5  Hypoglycemic protocol  Diabetic diet.  Repeat A1c

## 2022-11-18 NOTE — ASSESSMENT & PLAN NOTE
Elevated troponin likely in setting of demand ischemia and due to underlying AXEL, EKG unremarkable  Unlikely ACS  Denies chest pain  Continue to trend troponin  Telemetry monitoring, monitor EKG   echocardiogram unremarkable

## 2022-11-18 NOTE — ASSESSMENT & PLAN NOTE
BNP:970, troponin:109  Last echo 4/2022 with preserved EF  Low albumin:2.2   Lasix and aldactone as BP and renal function allows.    Repeat echocardiogram unremarkable  Unlikely heart failure  Current clinical condition likely from nephrotic syndrome causing lower extremity edema

## 2022-11-18 NOTE — PROGRESS NOTES
Assumed care of pt. Bedside report received from RN. Pt was updated on plan of care. AOx4. Pt pain level 3/10 back  pain from discomfort of laying in bed. Call light, phone and personal belongings in reach. Bed alarm on and working properly, bed in lowest position, and locked.

## 2022-11-18 NOTE — CARE PLAN
The patient is Watcher - Medium risk of patient condition declining or worsening    Shift Goals  Clinical Goals: complete UDS, COVID swab  Patient Goals: rest    Progress made toward(s) clinical / shift goals:      Problem: Care Map:  Day 1 Optimal Outcome for the Heart Failure Patient  Goal: Day 1:  Optimal Care of the heart failure patient  Outcome: Progressing       Patient is not progressing towards the following goals:

## 2022-11-19 LAB
ANION GAP SERPL CALC-SCNC: 7 MMOL/L (ref 7–16)
APPEARANCE UR: CLEAR
BACTERIA #/AREA URNS HPF: NEGATIVE /HPF
BILIRUB UR QL STRIP.AUTO: NEGATIVE
BUN SERPL-MCNC: 26 MG/DL (ref 8–22)
CALCIUM SERPL-MCNC: 7.6 MG/DL (ref 8.5–10.5)
CHLORIDE SERPL-SCNC: 110 MMOL/L (ref 96–112)
CO2 SERPL-SCNC: 24 MMOL/L (ref 20–33)
COLOR UR: YELLOW
CREAT SERPL-MCNC: 1.85 MG/DL (ref 0.5–1.4)
CREAT UR-MCNC: 81.09 MG/DL
EPI CELLS #/AREA URNS HPF: ABNORMAL /HPF
FERRITIN SERPL-MCNC: 213 NG/ML (ref 22–322)
GFR SERPLBLD CREATININE-BSD FMLA CKD-EPI: 43 ML/MIN/1.73 M 2
GLUCOSE BLD STRIP.AUTO-MCNC: 127 MG/DL (ref 65–99)
GLUCOSE BLD STRIP.AUTO-MCNC: 173 MG/DL (ref 65–99)
GLUCOSE BLD STRIP.AUTO-MCNC: 199 MG/DL (ref 65–99)
GLUCOSE BLD STRIP.AUTO-MCNC: 239 MG/DL (ref 65–99)
GLUCOSE SERPL-MCNC: 173 MG/DL (ref 65–99)
GLUCOSE UR STRIP.AUTO-MCNC: 500 MG/DL
HYALINE CASTS #/AREA URNS LPF: ABNORMAL /LPF
IRON SATN MFR SERPL: 32 % (ref 15–55)
IRON SERPL-MCNC: 64 UG/DL (ref 50–180)
KETONES UR STRIP.AUTO-MCNC: NEGATIVE MG/DL
LEUKOCYTE ESTERASE UR QL STRIP.AUTO: NEGATIVE
MICRO URNS: ABNORMAL
NITRITE UR QL STRIP.AUTO: NEGATIVE
PH UR STRIP.AUTO: 5.5 [PH] (ref 5–8)
POTASSIUM SERPL-SCNC: 3.5 MMOL/L (ref 3.6–5.5)
PROT UR QL STRIP: >=1000 MG/DL
PROT UR-MCNC: >600 MG/DL (ref 0–15)
PROT/CREAT UR: ABNORMAL MG/G (ref 15–68)
RBC # URNS HPF: ABNORMAL /HPF
RBC UR QL AUTO: ABNORMAL
SODIUM SERPL-SCNC: 141 MMOL/L (ref 135–145)
SP GR UR STRIP.AUTO: 1.02
TIBC SERPL-MCNC: 198 UG/DL (ref 250–450)
UIBC SERPL-MCNC: 134 UG/DL (ref 110–370)
UROBILINOGEN UR STRIP.AUTO-MCNC: 0.2 MG/DL
WBC #/AREA URNS HPF: ABNORMAL /HPF

## 2022-11-19 PROCEDURE — A9270 NON-COVERED ITEM OR SERVICE: HCPCS | Performed by: HOSPITALIST

## 2022-11-19 PROCEDURE — 83550 IRON BINDING TEST: CPT

## 2022-11-19 PROCEDURE — 80048 BASIC METABOLIC PNL TOTAL CA: CPT

## 2022-11-19 PROCEDURE — 94669 MECHANICAL CHEST WALL OSCILL: CPT

## 2022-11-19 PROCEDURE — 99233 SBSQ HOSP IP/OBS HIGH 50: CPT | Performed by: STUDENT IN AN ORGANIZED HEALTH CARE EDUCATION/TRAINING PROGRAM

## 2022-11-19 PROCEDURE — 84156 ASSAY OF PROTEIN URINE: CPT

## 2022-11-19 PROCEDURE — 82962 GLUCOSE BLOOD TEST: CPT

## 2022-11-19 PROCEDURE — 81001 URINALYSIS AUTO W/SCOPE: CPT

## 2022-11-19 PROCEDURE — 99222 1ST HOSP IP/OBS MODERATE 55: CPT | Performed by: INTERNAL MEDICINE

## 2022-11-19 PROCEDURE — 82728 ASSAY OF FERRITIN: CPT

## 2022-11-19 PROCEDURE — 82570 ASSAY OF URINE CREATININE: CPT

## 2022-11-19 PROCEDURE — 700111 HCHG RX REV CODE 636 W/ 250 OVERRIDE (IP): Performed by: HOSPITALIST

## 2022-11-19 PROCEDURE — 700102 HCHG RX REV CODE 250 W/ 637 OVERRIDE(OP): Performed by: HOSPITALIST

## 2022-11-19 PROCEDURE — 83540 ASSAY OF IRON: CPT

## 2022-11-19 PROCEDURE — 770020 HCHG ROOM/CARE - TELE (206)

## 2022-11-19 PROCEDURE — 36415 COLL VENOUS BLD VENIPUNCTURE: CPT

## 2022-11-19 PROCEDURE — 94640 AIRWAY INHALATION TREATMENT: CPT

## 2022-11-19 PROCEDURE — 700111 HCHG RX REV CODE 636 W/ 250 OVERRIDE (IP): Performed by: STUDENT IN AN ORGANIZED HEALTH CARE EDUCATION/TRAINING PROGRAM

## 2022-11-19 RX ORDER — PREDNISONE 20 MG/1
40 TABLET ORAL DAILY
Status: COMPLETED | OUTPATIENT
Start: 2022-11-19 | End: 2022-11-23

## 2022-11-19 RX ORDER — IPRATROPIUM BROMIDE AND ALBUTEROL SULFATE 2.5; .5 MG/3ML; MG/3ML
3 SOLUTION RESPIRATORY (INHALATION)
Status: DISCONTINUED | OUTPATIENT
Start: 2022-11-19 | End: 2022-11-23 | Stop reason: HOSPADM

## 2022-11-19 RX ORDER — IPRATROPIUM BROMIDE AND ALBUTEROL SULFATE 2.5; .5 MG/3ML; MG/3ML
3 SOLUTION RESPIRATORY (INHALATION)
Status: DISCONTINUED | OUTPATIENT
Start: 2022-11-19 | End: 2022-11-19

## 2022-11-19 RX ADMIN — ASPIRIN 81 MG: 81 TABLET, COATED ORAL at 06:07

## 2022-11-19 RX ADMIN — ATORVASTATIN CALCIUM 80 MG: 80 TABLET, FILM COATED ORAL at 16:57

## 2022-11-19 RX ADMIN — HEPARIN SODIUM 5000 UNITS: 5000 INJECTION, SOLUTION INTRAVENOUS; SUBCUTANEOUS at 06:07

## 2022-11-19 RX ADMIN — INSULIN HUMAN 2 UNITS: 100 INJECTION, SOLUTION PARENTERAL at 11:11

## 2022-11-19 RX ADMIN — POTASSIUM CHLORIDE 20 MEQ: 1500 TABLET, EXTENDED RELEASE ORAL at 16:57

## 2022-11-19 RX ADMIN — POTASSIUM CHLORIDE 20 MEQ: 1500 TABLET, EXTENDED RELEASE ORAL at 06:15

## 2022-11-19 RX ADMIN — CHOLESTYRAMINE 4 G: 4 POWDER, FOR SUSPENSION ORAL at 18:00

## 2022-11-19 RX ADMIN — CHOLESTYRAMINE 4 G: 4 POWDER, FOR SUSPENSION ORAL at 06:07

## 2022-11-19 RX ADMIN — AMLODIPINE BESYLATE 5 MG: 5 TABLET ORAL at 16:57

## 2022-11-19 RX ADMIN — PREDNISONE 40 MG: 20 TABLET ORAL at 11:11

## 2022-11-19 RX ADMIN — ENALAPRIL MALEATE 20 MG: 10 TABLET ORAL at 06:07

## 2022-11-19 RX ADMIN — OMEPRAZOLE 20 MG: 20 CAPSULE, DELAYED RELEASE ORAL at 06:07

## 2022-11-19 RX ADMIN — INSULIN HUMAN 3 UNITS: 100 INJECTION, SOLUTION PARENTERAL at 20:51

## 2022-11-19 RX ADMIN — AMLODIPINE BESYLATE 5 MG: 5 TABLET ORAL at 06:07

## 2022-11-19 RX ADMIN — INSULIN HUMAN 2 UNITS: 100 INJECTION, SOLUTION PARENTERAL at 17:01

## 2022-11-19 RX ADMIN — FUROSEMIDE 40 MG: 10 INJECTION, SOLUTION INTRAMUSCULAR; INTRAVENOUS at 06:07

## 2022-11-19 RX ADMIN — HEPARIN SODIUM 5000 UNITS: 5000 INJECTION, SOLUTION INTRAVENOUS; SUBCUTANEOUS at 20:55

## 2022-11-19 RX ADMIN — FUROSEMIDE 40 MG: 10 INJECTION, SOLUTION INTRAMUSCULAR; INTRAVENOUS at 16:57

## 2022-11-19 RX ADMIN — CARVEDILOL 12.5 MG: 12.5 TABLET, FILM COATED ORAL at 07:36

## 2022-11-19 RX ADMIN — HEPARIN SODIUM 5000 UNITS: 5000 INJECTION, SOLUTION INTRAVENOUS; SUBCUTANEOUS at 14:13

## 2022-11-19 RX ADMIN — CARVEDILOL 12.5 MG: 12.5 TABLET, FILM COATED ORAL at 16:57

## 2022-11-19 ASSESSMENT — FIBROSIS 4 INDEX: FIB4 SCORE: 2.34

## 2022-11-19 ASSESSMENT — ENCOUNTER SYMPTOMS
FEVER: 0
COUGH: 0
NAUSEA: 0
VOMITING: 0
BLURRED VISION: 0
SHORTNESS OF BREATH: 1
DIZZINESS: 0
MYALGIAS: 0
BRUISES/BLEEDS EASILY: 0

## 2022-11-19 ASSESSMENT — COPD QUESTIONNAIRES
DURING THE PAST 4 WEEKS HOW MUCH DID YOU FEEL SHORT OF BREATH: NONE/LITTLE OF THE TIME
HAVE YOU SMOKED AT LEAST 100 CIGARETTES IN YOUR ENTIRE LIFE: YES
DO YOU EVER COUGH UP ANY MUCUS OR PHLEGM?: NO/ONLY WITH OCCASIONAL COLDS OR INFECTIONS
COPD SCREENING SCORE: 3

## 2022-11-19 NOTE — CONSULTS
DATE OF SERVICE:  11/19/2022     NEPHROLOGY CONSULTATION     Consult at the request of Marcelo Sanders MD     REASON FOR CONSULTATION:  To evaluate patient with chronic kidney disease.     HISTORY OF PRESENT ILLNESS:  The patient is a 52-year-old male with multiple   medical problems, long-term history of diabetes mellitus type 2, diabetic   nephropathy, biopsy proven, proteinuria, seen in Nephrology Clinic by    [facility], Dr. Varun Ortiz, brought to the emergency room as   post-mechanical fall with head trauma.  His baseline creatinine level is   around 1.5-1.6, slightly worsened to 1.85.  Currently, the patient complains   of shortness of breath, wheezes, newly diagnosed with COPD, swellings at his   baseline. No dysuria, no hematuria, no flank pain.  No difficulties to   urinate.     REVIEW OF SYSTEMS:  GENERAL:  Positive for fatigue.  No fever or chills.  HENT:  No nosebleeds, no sore throat, no sinus pain.  EYES:  No double or blurry vision.  NECK:  No pain, no stiffness.  RESPIRATORY:  Mild shortness of breath, wheezes. No cough, hemoptysis.  CARDIOVASCULAR:  Positive for edema, dyspnea.  No chest pain.  GASTROINTESTINAL:  No abdominal pain, no nausea, vomiting, or diarrhea.  GENITOURINARY:  No dysuria, hematuria or flank pain.     All other systems reviewed, all negative.     PAST MEDICAL HISTORY:  Chronic kidney disease stage IIIA, proteinuria,   diabetic nephropathy, cataracts, hyperlipidemia, hypertension, coronary   stenting, retinopathy.     PAST SURGICAL HISTORY:  Toe amputation, coronary stenting, abdominal surgery,   tonsillectomy, vasectomy.     ALLERGIES:  No known drug allergies.     OUTPATIENT MEDICATIONS:  Reviewed.     PHYSICAL EXAMINATION:  VITAL SIGNS:  Blood pressure 114/74, temperature 36.6 Celsius, heart rate 75.  GENERAL APPEARANCE:  Well-developed, well-nourished male in no acute distress.  HEENT:  Normocephalic, atraumatic.  Pupils equal, round, reactive to light.    Extraocular  movement intact.  Nares patent.  Oropharynx clear, moist mucosa,   no erythema or exudate.  NECK:  Supple.  No lymphadenopathy, no thyromegaly appreciated.  LUNGS:  Bilateral wheezes. No rhonchi, no rales.  HEART:  Regular rhythm, no rub or gallop.  ABDOMEN:  Soft, nontender, nondistended.  Bowel sounds present.  No palpable   mass.  No costovertebral area tenderness to palpation.  EXTREMITIES:  Large bilateral pedal  edema.  No cyanosis.  SKIN:  Warm.  No erythema or rash.  NEUROLOGIC:  Alert, oriented x3. No focal deficit.  Cranial nerves II-XII   grossly intact.     LABORATORY RESULTS:  Reviewed, revealed hemoglobin 9.8. Sodium 141, potassium   3.5, BUN 26 and creatinine 1.85.     ASSESSMENT AND PLAN:  The patient is a very pleasant 52-year-old male with   multiple medical problems, diabetic nephropathy, chronic kidney disease stage   IIIA, admitted post-mechanical fall with head trauma, worsening shortness of   breath, wheezes.  1.  Chronic kidney disease stage IIIA, with slightly worsening creatinine   level.  To monitor closely.  2.  Electrolytes remain well controlled.  3.  Volume.  Continue Lasix IV.  4.  Anemia.  To monitor hemoglobin level.     RECOMMENDATIONS:  1.  Continue current treatment.  2.  Monitor weight.  3.  Monitor blood pressure.  4.  Low sodium diet, diabetic diet.     We will follow the patient closely.     Thank you for the consult.        ______________________________  MD ROHINI DEAMRCO/SHILOH/KAYLIE    DD:  11/19/2022 13:19  DT:  11/19/2022 13:38    Job#:  [id number]

## 2022-11-19 NOTE — PROGRESS NOTES
Hospital Medicine Daily Progress Note    Date of Service  11/19/2022    Chief Complaint  Beni Moore is a 52 y.o. male admitted 11/17/2022 with shortness of breath    Hospital Course  Beni Moore is a 52 y.o. male with a history of diabetes, hypertension, dyslipidemia, bilateral toe amputations, coronary artery disease with a history of stent placed 2019, obesity, likely SOBIA, diabetic gastric issues who presented 11/17/2022 with lethargy and increasing weakness as well as 50 pound weight gain over the past year with leg edema.  Also reports of episode of hypoglycemia.  Chest x-ray consistent with volume overload, small right sided pleural effusion, diffuse pleural thickening, atelectasis.  Labs remarkable for elevated BNP, troponin trended flat on 101.  Noted AXEL.  EKG with nonspecific ST changes    Interval Problem Update  11/18/2022  Vitals remained stable.  Currently on room air saturating over 90  Labs reviewed.  Noted AXEL    Continue Lasix  Nephrology evaluation if kidney function continue to worsen(baseline creatinine around 1.4)    Pending echocardiogram    11/19/2022  Vitals stable   On room air   Reports short of breathe   Worsening renal function . Last evauted by Dr Ortiz .  Lucentis held.    Wheezing on exam   Added suo neb and steroid  Echo unremarkable   Continue lasix    Nephrology Dr Plascencia  to evaluate     I have discussed this patient's plan of care and discharge plan at IDT rounds today with Case Management, Nursing, Nursing leadership, and other members of the IDT team.        Code Status  Full Code    Disposition  Patient is not medically cleared for discharge.   Anticipate discharge to to home with close outpatient follow-up.  I have placed the appropriate orders for post-discharge needs.    Review of Systems  Review of Systems   Constitutional:  Negative for fever.   HENT:  Negative for hearing loss.    Eyes:  Negative for blurred vision.   Respiratory:  Positive for shortness of breath.  Negative for cough.    Cardiovascular:  Positive for leg swelling. Negative for chest pain.   Gastrointestinal:  Negative for nausea and vomiting.   Genitourinary:  Negative for dysuria.   Musculoskeletal:  Negative for myalgias.   Skin:  Negative for rash.   Neurological:  Negative for dizziness.   Endo/Heme/Allergies:  Does not bruise/bleed easily.      Physical Exam  Temp:  [36.3 °C (97.4 °F)-37.2 °C (98.9 °F)] 36.6 °C (97.9 °F)  Pulse:  [75-84] 79  Resp:  [16-20] 16  BP: (114-158)/(74-89) 146/82  SpO2:  [92 %-100 %] 95 %    Physical Exam  Constitutional:       General: He is not in acute distress.     Appearance: He is obese.   HENT:      Head: Normocephalic and atraumatic.      Right Ear: Tympanic membrane normal.      Left Ear: Tympanic membrane normal.      Nose: Nose normal.      Mouth/Throat:      Mouth: Mucous membranes are moist.   Eyes:      Extraocular Movements: Extraocular movements intact.      Pupils: Pupils are equal, round, and reactive to light.   Cardiovascular:      Rate and Rhythm: Normal rate and regular rhythm.      Pulses: Normal pulses.   Pulmonary:      Effort: Pulmonary effort is normal. No respiratory distress.      Breath sounds: Wheezing present.   Abdominal:      General: Abdomen is flat. There is no distension.   Musculoskeletal:      Cervical back: Normal range of motion.      Right lower leg: Edema present.      Left lower leg: Edema present.      Comments: Right metatarsal  amputation    Skin:     General: Skin is warm.   Neurological:      General: No focal deficit present.      Mental Status: He is alert and oriented to person, place, and time. Mental status is at baseline.   Psychiatric:         Mood and Affect: Mood normal.       Fluids    Intake/Output Summary (Last 24 hours) at 11/19/2022 1336  Last data filed at 11/19/2022 0800  Gross per 24 hour   Intake 720 ml   Output 2325 ml   Net -1605 ml         Laboratory  Recent Labs     11/17/22  1254 11/18/22  0041   WBC 9.2 7.5    RBC 3.77* 3.24*   HEMOGLOBIN 11.3* 9.8*   HEMATOCRIT 32.6* 28.1*   MCV 86.5 86.7   MCH 30.0 30.2   MCHC 34.7 34.9   RDW 40.4 40.6   PLATELETCT 224 179   MPV 10.1 10.3       Recent Labs     11/17/22  1254 11/18/22  0041 11/19/22  0056   SODIUM 139 141 141   POTASSIUM 3.5* 3.2* 3.5*   CHLORIDE 109 111 110   CO2 23 23 24   GLUCOSE 73 111* 173*   BUN 27* 26* 26*   CREATININE 1.64* 1.68* 1.85*   CALCIUM 7.6* 7.4* 7.6*                     Imaging  EC-ECHOCARDIOGRAM COMPLETE W/O CONT   Final Result      CT-HEAD W/O   Final Result      Head CT without contrast within normal limits. No evidence of acute cerebral infarction, hemorrhage or mass lesion.         DX-CHEST-PORTABLE (1 VIEW)   Final Result      1.  Small right-sided pleural effusion and right lateral pleural thickening.      2.  Faint opacification throughout the right lower hemithorax consistent with diffuse pleural thickening, atelectasis, and/or pneumonitis.             Assessment/Plan  * Acute heart failure, unspecified heart failure type (HCC)- (present on admission)  Assessment & Plan  BNP:970, troponin:109  Repeat Echocardiogrm.  Last echo 4/2022 with preserved EF  Low albumin:2.2   Lasix and aldactone as BP and renal function allows.    Troponin I above reference range  Assessment & Plan  Elevated troponin likely in setting of demand ischemia and due to underlying AXEL, EKG unremarkable  Unlikely ACS  Denies chest pain  Continue to trend troponin  Telemetry monitoring, monitor EKG   echocardiogram unremarkable         Serum albumin decreased  Assessment & Plan  11/17 Alb:2.2  Check UA for proteinuria. Rule out nephrotic syndrome.    Renal insufficiency  Assessment & Plan  He has followed up with Dr Ortiz in the past for nephrology  Patient states his ocular medication was causing some renal dysfunction along with his diabeties  Monitor bmp, vitals, I/O's    Nephrology consulted    Body mass index (BMI) 38.0-38.9, adult- (present on admission)  Assessment &  Plan  Body mass index is 38.52 kg/m².  Component of water weight from volume overload.  Monitor for euvolemic weight    Presence of stent in coronary artery- (present on admission)  Assessment & Plan  As per CAD management    Type 2 diabetes mellitus with hyperglycemia, with long-term current use of insulin (HCC)- (present on admission)  Assessment & Plan  Per patient episode of hypoglycemia 11/17 am.  Hold trulicity, glargine and glimipiride  Monitor accuchecks and cover with SSI  11/11/22 A1c:5.5  Hypoglycemic protocol  Diabetic diet.  Repeat A1c      Coronary artery disease involving native coronary artery of native heart without angina pectoris- (present on admission)  Assessment & Plan  Medical management with aspirin 81mg daily, coreg 12.5mg BID, atorvastatin 80mg nightly.  Elevated troponin: 109 and will follow serial troponins x 3  EKG reviewed  Monitor on telemetry  Follows outpatient with Dr Andrew Engel    S/P transmetatarsal amputation of foot, right (HCC)- (present on admission)  Assessment & Plan  No acute wounds noted on bilateral feet       VTE prophylaxis: SCDs/TEDs and heparin ppx    I have performed a physical exam and reviewed and updated ROS and Plan today (11/19/2022). In review of yesterday's note (11/18/2022), there are no changes except as documented above.

## 2022-11-19 NOTE — CARE PLAN
The patient is Stable - Low risk of patient condition declining or worsening    Shift Goals  Clinical Goals: diurese  Patient Goals: rest    Progress made toward(s) clinical / shift goals:  pt denied pain, fall precaution in placed.     Patient is not progressing towards the following goals:

## 2022-11-19 NOTE — PROGRESS NOTES
Agree with primary hospitalist that current issues are NOT suggestive of COPD exacerbation. Chart audited and COPD not present on problem list.    __________  Sam Jennings MD  Pulmonary and Critical Care Medicine  [facility]

## 2022-11-20 PROBLEM — R06.2 WHEEZING: Status: ACTIVE | Noted: 2022-11-20

## 2022-11-20 LAB
ANION GAP SERPL CALC-SCNC: 9 MMOL/L (ref 7–16)
BASOPHILS # BLD AUTO: 0.3 % (ref 0–1.8)
BASOPHILS # BLD: 0.02 K/UL (ref 0–0.12)
BUN SERPL-MCNC: 27 MG/DL (ref 8–22)
CALCIUM SERPL-MCNC: 8.1 MG/DL (ref 8.5–10.5)
CHLORIDE SERPL-SCNC: 107 MMOL/L (ref 96–112)
CO2 SERPL-SCNC: 21 MMOL/L (ref 20–33)
CREAT SERPL-MCNC: 1.73 MG/DL (ref 0.5–1.4)
EOSINOPHIL # BLD AUTO: 0 K/UL (ref 0–0.51)
EOSINOPHIL NFR BLD: 0 % (ref 0–6.9)
ERYTHROCYTE [DISTWIDTH] IN BLOOD BY AUTOMATED COUNT: 41.3 FL (ref 35.9–50)
GFR SERPLBLD CREATININE-BSD FMLA CKD-EPI: 46 ML/MIN/1.73 M 2
GLUCOSE BLD STRIP.AUTO-MCNC: 294 MG/DL (ref 65–99)
GLUCOSE BLD STRIP.AUTO-MCNC: 294 MG/DL (ref 65–99)
GLUCOSE BLD STRIP.AUTO-MCNC: 300 MG/DL (ref 65–99)
GLUCOSE BLD STRIP.AUTO-MCNC: 306 MG/DL (ref 65–99)
GLUCOSE SERPL-MCNC: 316 MG/DL (ref 65–99)
HCT VFR BLD AUTO: 35.5 % (ref 42–52)
HGB BLD-MCNC: 12.1 G/DL (ref 14–18)
IMM GRANULOCYTES # BLD AUTO: 0.02 K/UL (ref 0–0.11)
IMM GRANULOCYTES NFR BLD AUTO: 0.3 % (ref 0–0.9)
LYMPHOCYTES # BLD AUTO: 1.04 K/UL (ref 1–4.8)
LYMPHOCYTES NFR BLD: 17.4 % (ref 22–41)
MCH RBC QN AUTO: 30 PG (ref 27–33)
MCHC RBC AUTO-ENTMCNC: 34.1 G/DL (ref 33.7–35.3)
MCV RBC AUTO: 88.1 FL (ref 81.4–97.8)
MONOCYTES # BLD AUTO: 0.08 K/UL (ref 0–0.85)
MONOCYTES NFR BLD AUTO: 1.3 % (ref 0–13.4)
NEUTROPHILS # BLD AUTO: 4.8 K/UL (ref 1.82–7.42)
NEUTROPHILS NFR BLD: 80.7 % (ref 44–72)
NRBC # BLD AUTO: 0 K/UL
NRBC BLD-RTO: 0 /100 WBC
PLATELET # BLD AUTO: 187 K/UL (ref 164–446)
PMV BLD AUTO: 10.7 FL (ref 9–12.9)
POTASSIUM SERPL-SCNC: 4.2 MMOL/L (ref 3.6–5.5)
RBC # BLD AUTO: 4.03 M/UL (ref 4.7–6.1)
SODIUM SERPL-SCNC: 137 MMOL/L (ref 135–145)
WBC # BLD AUTO: 6 K/UL (ref 4.8–10.8)

## 2022-11-20 PROCEDURE — 99233 SBSQ HOSP IP/OBS HIGH 50: CPT | Performed by: STUDENT IN AN ORGANIZED HEALTH CARE EDUCATION/TRAINING PROGRAM

## 2022-11-20 PROCEDURE — 36415 COLL VENOUS BLD VENIPUNCTURE: CPT

## 2022-11-20 PROCEDURE — A9270 NON-COVERED ITEM OR SERVICE: HCPCS | Performed by: HOSPITALIST

## 2022-11-20 PROCEDURE — 700102 HCHG RX REV CODE 250 W/ 637 OVERRIDE(OP): Performed by: HOSPITALIST

## 2022-11-20 PROCEDURE — A9270 NON-COVERED ITEM OR SERVICE: HCPCS

## 2022-11-20 PROCEDURE — 85025 COMPLETE CBC W/AUTO DIFF WBC: CPT

## 2022-11-20 PROCEDURE — 770020 HCHG ROOM/CARE - TELE (206)

## 2022-11-20 PROCEDURE — 700102 HCHG RX REV CODE 250 W/ 637 OVERRIDE(OP): Performed by: STUDENT IN AN ORGANIZED HEALTH CARE EDUCATION/TRAINING PROGRAM

## 2022-11-20 PROCEDURE — 80048 BASIC METABOLIC PNL TOTAL CA: CPT

## 2022-11-20 PROCEDURE — 82962 GLUCOSE BLOOD TEST: CPT | Mod: 91

## 2022-11-20 PROCEDURE — 99233 SBSQ HOSP IP/OBS HIGH 50: CPT | Performed by: INTERNAL MEDICINE

## 2022-11-20 PROCEDURE — 700111 HCHG RX REV CODE 636 W/ 250 OVERRIDE (IP): Performed by: STUDENT IN AN ORGANIZED HEALTH CARE EDUCATION/TRAINING PROGRAM

## 2022-11-20 PROCEDURE — 700102 HCHG RX REV CODE 250 W/ 637 OVERRIDE(OP)

## 2022-11-20 PROCEDURE — 700111 HCHG RX REV CODE 636 W/ 250 OVERRIDE (IP): Performed by: HOSPITALIST

## 2022-11-20 RX ORDER — AMLODIPINE BESYLATE 10 MG/1
10 TABLET ORAL 2 TIMES DAILY
Status: DISCONTINUED | OUTPATIENT
Start: 2022-11-20 | End: 2022-11-20

## 2022-11-20 RX ORDER — AMLODIPINE BESYLATE 5 MG/1
5 TABLET ORAL 2 TIMES DAILY
Status: DISCONTINUED | OUTPATIENT
Start: 2022-11-20 | End: 2022-11-21

## 2022-11-20 RX ORDER — AMLODIPINE BESYLATE 5 MG/1
TABLET ORAL
Status: COMPLETED
Start: 2022-11-20 | End: 2022-11-20

## 2022-11-20 RX ORDER — INSULIN LISPRO 100 [IU]/ML
0.2 INJECTION, SOLUTION INTRAVENOUS; SUBCUTANEOUS
Status: DISCONTINUED | OUTPATIENT
Start: 2022-11-20 | End: 2022-11-23 | Stop reason: HOSPADM

## 2022-11-20 RX ORDER — INSULIN LISPRO 100 [IU]/ML
1-6 INJECTION, SOLUTION INTRAVENOUS; SUBCUTANEOUS
Status: DISCONTINUED | OUTPATIENT
Start: 2022-11-20 | End: 2022-11-23 | Stop reason: HOSPADM

## 2022-11-20 RX ADMIN — INSULIN LISPRO 8 UNITS: 100 INJECTION, SOLUTION INTRAVENOUS; SUBCUTANEOUS at 16:45

## 2022-11-20 RX ADMIN — HEPARIN SODIUM 5000 UNITS: 5000 INJECTION, SOLUTION INTRAVENOUS; SUBCUTANEOUS at 06:15

## 2022-11-20 RX ADMIN — INSULIN LISPRO 4 UNITS: 100 INJECTION, SOLUTION INTRAVENOUS; SUBCUTANEOUS at 12:19

## 2022-11-20 RX ADMIN — INSULIN LISPRO 3 UNITS: 100 INJECTION, SOLUTION INTRAVENOUS; SUBCUTANEOUS at 20:45

## 2022-11-20 RX ADMIN — INSULIN LISPRO 8 UNITS: 100 INJECTION, SOLUTION INTRAVENOUS; SUBCUTANEOUS at 12:19

## 2022-11-20 RX ADMIN — ATORVASTATIN CALCIUM 80 MG: 80 TABLET, FILM COATED ORAL at 16:54

## 2022-11-20 RX ADMIN — ASPIRIN 81 MG: 81 TABLET, COATED ORAL at 06:13

## 2022-11-20 RX ADMIN — INSULIN LISPRO 3 UNITS: 100 INJECTION, SOLUTION INTRAVENOUS; SUBCUTANEOUS at 16:45

## 2022-11-20 RX ADMIN — OMEPRAZOLE 20 MG: 20 CAPSULE, DELAYED RELEASE ORAL at 06:13

## 2022-11-20 RX ADMIN — INSULIN HUMAN 5 UNITS: 100 INJECTION, SOLUTION PARENTERAL at 06:24

## 2022-11-20 RX ADMIN — FUROSEMIDE 40 MG: 10 INJECTION, SOLUTION INTRAMUSCULAR; INTRAVENOUS at 16:44

## 2022-11-20 RX ADMIN — PREDNISONE 40 MG: 20 TABLET ORAL at 06:13

## 2022-11-20 RX ADMIN — AMLODIPINE BESYLATE 5 MG: 5 TABLET ORAL at 16:54

## 2022-11-20 RX ADMIN — HEPARIN SODIUM 5000 UNITS: 5000 INJECTION, SOLUTION INTRAVENOUS; SUBCUTANEOUS at 20:50

## 2022-11-20 RX ADMIN — POTASSIUM CHLORIDE 20 MEQ: 1500 TABLET, EXTENDED RELEASE ORAL at 06:13

## 2022-11-20 RX ADMIN — FUROSEMIDE 40 MG: 10 INJECTION, SOLUTION INTRAMUSCULAR; INTRAVENOUS at 06:15

## 2022-11-20 RX ADMIN — POTASSIUM CHLORIDE 20 MEQ: 1500 TABLET, EXTENDED RELEASE ORAL at 16:55

## 2022-11-20 RX ADMIN — AMLODIPINE BESYLATE 5 MG: 5 TABLET ORAL at 06:13

## 2022-11-20 RX ADMIN — CARVEDILOL 12.5 MG: 12.5 TABLET, FILM COATED ORAL at 08:46

## 2022-11-20 RX ADMIN — INSULIN GLARGINE-YFGN 25 UNITS: 100 INJECTION, SOLUTION SUBCUTANEOUS at 16:55

## 2022-11-20 RX ADMIN — CARVEDILOL 12.5 MG: 12.5 TABLET, FILM COATED ORAL at 16:44

## 2022-11-20 RX ADMIN — CHOLESTYRAMINE 4 G: 4 POWDER, FOR SUSPENSION ORAL at 08:46

## 2022-11-20 RX ADMIN — CHOLESTYRAMINE 4 G: 4 POWDER, FOR SUSPENSION ORAL at 16:55

## 2022-11-20 ASSESSMENT — FIBROSIS 4 INDEX
FIB4 SCORE: 2.24
FIB4 SCORE: 2.24

## 2022-11-20 ASSESSMENT — ENCOUNTER SYMPTOMS
NAUSEA: 0
FLANK PAIN: 0
SHORTNESS OF BREATH: 1
BRUISES/BLEEDS EASILY: 0
VOMITING: 0
WHEEZING: 0
COUGH: 0
SINUS PAIN: 0
DIARRHEA: 0
BLURRED VISION: 0
FEVER: 0
EYES NEGATIVE: 1
ABDOMINAL PAIN: 0
PALPITATIONS: 0
HEMOPTYSIS: 0
WEIGHT LOSS: 0
ORTHOPNEA: 1
CHILLS: 0
DIZZINESS: 0
MYALGIAS: 0

## 2022-11-20 NOTE — RESPIRATORY CARE
COPD EDUCATION by COPD CLINICAL EDUCATOR  11/19/2022 at 5:32 PM by Baylee Jackson, RRT     Patient interviewed by COPD/LUNG disease education team at requested of his hospital team. Patient denies a history/diagnosis of COPD and is a non-smoker. We discussed his current symptoms and response (negative) to respiratory medications.  [facility] notified and reviewed chart and discussed with team.   COPD/LUNG  Screen  COPD Risk Screening  Do you have a history of COPD?: No  COPD Population Screener  During the past 4 weeks, how much did you feel short of breath?: None/Little of the time  Do you ever cough up any mucus or phlegm?: No/only with occasional colds or infections  In the past 12 months, you do less than you used to because of your breathing problems: Disagree/unsure  Have you smoked at least 100 cigarettes in your entire life?: Yes  How old are you[age] 50-59  COPD Screening Score: 3  COPD Coordinator Not Recommended: Yes    COPD Assessment  COPD Clinical Specialists ONLY  COPD Education Initiated: Yes--Short Intervention (met with patient at request og Hospital team and COPD exac order set utilization. [facility] Consulted and deemed NOT exacerbation. Given treatments and pt denied benefit. quit smoking 2010 Stage III kidney and acute heart failure. On room Air)  DME Company: none  Physician Name: WILFREDO Appiah  Pulmonologist Name: [facility] reviewed  Is this a COPD exacerbation patient?: No (Oder set used and Pulmonary stated no- no history.dx will benefit outpt full PFT)    PFT Results  No results found for: PFT    Meds to Beds  Would the patient like to opt in for Bedside Medication Delivery at Discharge?: Yes, interested

## 2022-11-20 NOTE — CARE PLAN
The patient is Stable - Low risk of patient condition declining or worsening    Shift Goals: rest  Clinical Goals: diurese  Patient Goals: rest    Progress made toward(s) clinical / shift goals:      Problem: Knowledge Deficit - Standard  Goal: Patient and family/care givers will demonstrate understanding of plan of care, disease process/condition, diagnostic tests and medications  Outcome: Progressing     Problem: Knowledge Deficit - COPD  Goal: Patient/significant other demonstrates understanding of disease process, utilization of the Action Plan, medications and discharge instruction  Outcome: Progressing     Problem: Self Care  Goal: Patient will have the ability to perform ADLs independently or with assistance (bathe, groom, dress, toilet and feed)  Outcome: Progressing

## 2022-11-20 NOTE — CARE PLAN
The patient is Stable - Low risk of patient condition declining or worsening    Shift Goals  Clinical Goals: Diuresis  Patient Goals: rest    Progress made toward(s) clinical / shift goals:  IV furosemide per MAR.    Problem: Care Map:  Day 3 Optimal Outcome for the Heart Failure Patient  Goal: Day 3:  Optimal Care of the heart failure patient  Outcome: Progressing  Note: HF education provided, pt verbalizes understanding.        Patient is not progressing towards the following goals: N/A

## 2022-11-20 NOTE — ASSESSMENT & PLAN NOTE
Need outpatient follow-up with pulmonology for PFT  Complete 5 days course of prednisone  On DuoNeb

## 2022-11-20 NOTE — PROGRESS NOTES
Nephrology Daily Progress Note    Date of Service  11/20/2022    Chief Complaint  52 y.o. male with CKD IIIa, diabetic nephropathy,  admitted 11/17/2022 with worsening SOB, consulted for worsening creat level    Interval Problem Update  11/20 -feels better, still mild SOB with wheezes  Edema slightly better  Good UOP  Creat level improving    Review of Systems  Review of Systems   Constitutional:  Negative for chills, fever, malaise/fatigue and weight loss.   HENT:  Negative for congestion, hearing loss and sinus pain.    Eyes: Negative.    Respiratory:  Positive for shortness of breath. Negative for cough, hemoptysis and wheezing.    Cardiovascular:  Positive for orthopnea and leg swelling. Negative for chest pain and palpitations.   Gastrointestinal:  Negative for abdominal pain, diarrhea, nausea and vomiting.   Genitourinary:  Negative for dysuria, flank pain, frequency, hematuria and urgency.   Skin: Negative.    All other systems reviewed and are negative.     Physical Exam  Temp:  [36.7 °C (98.1 °F)-37 °C (98.6 °F)] 36.8 °C (98.2 °F)  Pulse:  [79-82] 81  Resp:  [16-18] 18  BP: (142-158)/(82-91) 158/91  SpO2:  [94 %-98 %] 98 %    Physical Exam  Vitals reviewed.   Constitutional:       General: He is not in acute distress.     Appearance: Normal appearance. He is well-developed. He is not diaphoretic.   HENT:      Head: Normocephalic and atraumatic.      Nose: Nose normal.      Mouth/Throat:      Mouth: Mucous membranes are moist.      Pharynx: Oropharynx is clear.   Eyes:      Extraocular Movements: Extraocular movements intact.      Conjunctiva/sclera: Conjunctivae normal.      Pupils: Pupils are equal, round, and reactive to light.   Cardiovascular:      Rate and Rhythm: Normal rate and regular rhythm.      Pulses: Normal pulses.      Heart sounds: Normal heart sounds.   Pulmonary:      Effort: Pulmonary effort is normal. No respiratory distress.      Breath sounds: Wheezing present. No rales.   Abdominal:       General: Bowel sounds are normal. There is distension.      Palpations: Abdomen is soft. There is no mass.      Tenderness: There is no abdominal tenderness. There is no right CVA tenderness or left CVA tenderness.   Musculoskeletal:      Cervical back: Normal range of motion and neck supple.      Right lower leg: Edema present.      Left lower leg: Edema present.   Skin:     General: Skin is warm and dry.      Coloration: Skin is not pale.      Findings: No erythema or rash.   Neurological:      General: No focal deficit present.      Mental Status: He is alert and oriented to person, place, and time.      Cranial Nerves: No cranial nerve deficit.      Coordination: Coordination normal.   Psychiatric:         Mood and Affect: Mood normal.         Behavior: Behavior normal.         Thought Content: Thought content normal.         Judgment: Judgment normal.       Fluids    Intake/Output Summary (Last 24 hours) at 11/20/2022 1209  Last data filed at 11/20/2022 0500  Gross per 24 hour   Intake 240 ml   Output 1275 ml   Net -1035 ml       Laboratory  Recent Labs     11/17/22  1254 11/18/22  0041 11/20/22  0109   WBC 9.2 7.5 6.0   RBC 3.77* 3.24* 4.03*   HEMOGLOBIN 11.3* 9.8* 12.1*   HEMATOCRIT 32.6* 28.1* 35.5*   MCV 86.5 86.7 88.1   MCH 30.0 30.2 30.0   MCHC 34.7 34.9 34.1   RDW 40.4 40.6 41.3   PLATELETCT 224 179 187   MPV 10.1 10.3 10.7     Recent Labs     11/18/22  0041 11/19/22  0056 11/20/22  0109   SODIUM 141 141 137   POTASSIUM 3.2* 3.5* 4.2   CHLORIDE 111 110 107   CO2 23 24 21   GLUCOSE 111* 173* 316*   BUN 26* 26* 27*   CREATININE 1.68* 1.85* 1.73*   CALCIUM 7.4* 7.6* 8.1*         Recent Labs     11/17/22  1254 11/18/22  0041   NTPROBNP 970* 806*           Imaging  EC-ECHOCARDIOGRAM COMPLETE W/O CONT   Final Result      CT-HEAD W/O   Final Result      Head CT without contrast within normal limits. No evidence of acute cerebral infarction, hemorrhage or mass lesion.         DX-CHEST-PORTABLE (1 VIEW)   Final  Result      1.  Small right-sided pleural effusion and right lateral pleural thickening.      2.  Faint opacification throughout the right lower hemithorax consistent with diffuse pleural thickening, atelectasis, and/or pneumonitis.           Assessment/Plan  1.AXEL/CKD IIIa -improving to monitor  2.HTN: elevated BP -to restart enalapril  3.Electrolytes: well controlled.  4.Anemia: Hb stable  5.Diabetic nephropathy with nephrotic range proteinuria -to restart ACEi  6.Volume: overload -to continue lasix    Recs:  Restart ACEi  Daily labs  Low salt/diabetic diet  Avoid nephrotoxic  Monitor BP  Will follow  D/w Dr Sanders

## 2022-11-21 LAB
ANION GAP SERPL CALC-SCNC: 6 MMOL/L (ref 7–16)
BUN SERPL-MCNC: 37 MG/DL (ref 8–22)
CALCIUM SERPL-MCNC: 7.8 MG/DL (ref 8.5–10.5)
CHLORIDE SERPL-SCNC: 110 MMOL/L (ref 96–112)
CO2 SERPL-SCNC: 23 MMOL/L (ref 20–33)
CREAT SERPL-MCNC: 2 MG/DL (ref 0.5–1.4)
GFR SERPLBLD CREATININE-BSD FMLA CKD-EPI: 39 ML/MIN/1.73 M 2
GLUCOSE BLD STRIP.AUTO-MCNC: 143 MG/DL (ref 65–99)
GLUCOSE BLD STRIP.AUTO-MCNC: 159 MG/DL (ref 65–99)
GLUCOSE BLD STRIP.AUTO-MCNC: 197 MG/DL (ref 65–99)
GLUCOSE SERPL-MCNC: 183 MG/DL (ref 65–99)
POTASSIUM SERPL-SCNC: 4.1 MMOL/L (ref 3.6–5.5)
SODIUM SERPL-SCNC: 139 MMOL/L (ref 135–145)

## 2022-11-21 PROCEDURE — A9270 NON-COVERED ITEM OR SERVICE: HCPCS | Performed by: INTERNAL MEDICINE

## 2022-11-21 PROCEDURE — 700102 HCHG RX REV CODE 250 W/ 637 OVERRIDE(OP): Performed by: INTERNAL MEDICINE

## 2022-11-21 PROCEDURE — 36415 COLL VENOUS BLD VENIPUNCTURE: CPT

## 2022-11-21 PROCEDURE — 80048 BASIC METABOLIC PNL TOTAL CA: CPT

## 2022-11-21 PROCEDURE — 700102 HCHG RX REV CODE 250 W/ 637 OVERRIDE(OP): Performed by: STUDENT IN AN ORGANIZED HEALTH CARE EDUCATION/TRAINING PROGRAM

## 2022-11-21 PROCEDURE — A9270 NON-COVERED ITEM OR SERVICE: HCPCS | Performed by: STUDENT IN AN ORGANIZED HEALTH CARE EDUCATION/TRAINING PROGRAM

## 2022-11-21 PROCEDURE — 700111 HCHG RX REV CODE 636 W/ 250 OVERRIDE (IP): Performed by: HOSPITALIST

## 2022-11-21 PROCEDURE — 700102 HCHG RX REV CODE 250 W/ 637 OVERRIDE(OP): Performed by: HOSPITALIST

## 2022-11-21 PROCEDURE — 82962 GLUCOSE BLOOD TEST: CPT | Mod: 91

## 2022-11-21 PROCEDURE — 700111 HCHG RX REV CODE 636 W/ 250 OVERRIDE (IP): Performed by: INTERNAL MEDICINE

## 2022-11-21 PROCEDURE — 99233 SBSQ HOSP IP/OBS HIGH 50: CPT | Performed by: STUDENT IN AN ORGANIZED HEALTH CARE EDUCATION/TRAINING PROGRAM

## 2022-11-21 PROCEDURE — 770020 HCHG ROOM/CARE - TELE (206)

## 2022-11-21 PROCEDURE — 99233 SBSQ HOSP IP/OBS HIGH 50: CPT | Performed by: INTERNAL MEDICINE

## 2022-11-21 PROCEDURE — A9270 NON-COVERED ITEM OR SERVICE: HCPCS | Performed by: HOSPITALIST

## 2022-11-21 PROCEDURE — 700111 HCHG RX REV CODE 636 W/ 250 OVERRIDE (IP): Performed by: STUDENT IN AN ORGANIZED HEALTH CARE EDUCATION/TRAINING PROGRAM

## 2022-11-21 RX ORDER — ENALAPRIL MALEATE 10 MG/1
20 TABLET ORAL TWICE DAILY
Status: DISCONTINUED | OUTPATIENT
Start: 2022-11-21 | End: 2022-11-23 | Stop reason: HOSPADM

## 2022-11-21 RX ORDER — FUROSEMIDE 10 MG/ML
80 INJECTION INTRAMUSCULAR; INTRAVENOUS
Status: DISCONTINUED | OUTPATIENT
Start: 2022-11-21 | End: 2022-11-22

## 2022-11-21 RX ADMIN — CHOLESTYRAMINE 4 G: 4 POWDER, FOR SUSPENSION ORAL at 18:27

## 2022-11-21 RX ADMIN — ENALAPRIL MALEATE 20 MG: 10 TABLET ORAL at 16:29

## 2022-11-21 RX ADMIN — INSULIN LISPRO 8 UNITS: 100 INJECTION, SOLUTION INTRAVENOUS; SUBCUTANEOUS at 11:41

## 2022-11-21 RX ADMIN — INSULIN LISPRO 1 UNITS: 100 INJECTION, SOLUTION INTRAVENOUS; SUBCUTANEOUS at 11:41

## 2022-11-21 RX ADMIN — CARVEDILOL 12.5 MG: 12.5 TABLET, FILM COATED ORAL at 05:58

## 2022-11-21 RX ADMIN — FUROSEMIDE 80 MG: 10 INJECTION, SOLUTION INTRAMUSCULAR; INTRAVENOUS at 15:29

## 2022-11-21 RX ADMIN — HEPARIN SODIUM 5000 UNITS: 5000 INJECTION, SOLUTION INTRAVENOUS; SUBCUTANEOUS at 15:29

## 2022-11-21 RX ADMIN — AMLODIPINE BESYLATE 5 MG: 5 TABLET ORAL at 05:55

## 2022-11-21 RX ADMIN — CARVEDILOL 12.5 MG: 12.5 TABLET, FILM COATED ORAL at 16:29

## 2022-11-21 RX ADMIN — POTASSIUM CHLORIDE 20 MEQ: 1500 TABLET, EXTENDED RELEASE ORAL at 05:57

## 2022-11-21 RX ADMIN — INSULIN LISPRO 1 UNITS: 100 INJECTION, SOLUTION INTRAVENOUS; SUBCUTANEOUS at 20:53

## 2022-11-21 RX ADMIN — CHOLESTYRAMINE 4 G: 4 POWDER, FOR SUSPENSION ORAL at 05:56

## 2022-11-21 RX ADMIN — INSULIN LISPRO 8 UNITS: 100 INJECTION, SOLUTION INTRAVENOUS; SUBCUTANEOUS at 06:12

## 2022-11-21 RX ADMIN — INSULIN LISPRO 8 UNITS: 100 INJECTION, SOLUTION INTRAVENOUS; SUBCUTANEOUS at 16:32

## 2022-11-21 RX ADMIN — INSULIN LISPRO 2 UNITS: 100 INJECTION, SOLUTION INTRAVENOUS; SUBCUTANEOUS at 16:33

## 2022-11-21 RX ADMIN — HEPARIN SODIUM 5000 UNITS: 5000 INJECTION, SOLUTION INTRAVENOUS; SUBCUTANEOUS at 20:55

## 2022-11-21 RX ADMIN — POTASSIUM CHLORIDE 20 MEQ: 1500 TABLET, EXTENDED RELEASE ORAL at 16:29

## 2022-11-21 RX ADMIN — PREDNISONE 40 MG: 20 TABLET ORAL at 05:55

## 2022-11-21 RX ADMIN — HEPARIN SODIUM 5000 UNITS: 5000 INJECTION, SOLUTION INTRAVENOUS; SUBCUTANEOUS at 05:56

## 2022-11-21 RX ADMIN — OMEPRAZOLE 20 MG: 20 CAPSULE, DELAYED RELEASE ORAL at 05:55

## 2022-11-21 RX ADMIN — ATORVASTATIN CALCIUM 80 MG: 80 TABLET, FILM COATED ORAL at 16:30

## 2022-11-21 RX ADMIN — ASPIRIN 81 MG: 81 TABLET, COATED ORAL at 05:55

## 2022-11-21 RX ADMIN — FUROSEMIDE 40 MG: 10 INJECTION, SOLUTION INTRAMUSCULAR; INTRAVENOUS at 05:57

## 2022-11-21 ASSESSMENT — ENCOUNTER SYMPTOMS
BLURRED VISION: 0
MYALGIAS: 0
DIZZINESS: 0
BRUISES/BLEEDS EASILY: 0
FEVER: 0
SHORTNESS OF BREATH: 1
CHILLS: 0
NAUSEA: 0
WHEEZING: 1
COUGH: 0
VOMITING: 0
SHORTNESS OF BREATH: 0

## 2022-11-21 ASSESSMENT — FIBROSIS 4 INDEX: FIB4 SCORE: 2.24

## 2022-11-21 NOTE — CARE PLAN
The patient is Stable - Low risk of patient condition declining or worsening    Shift Goals  Clinical Goals: Diuresis  Patient Goals: rest    Progress made toward(s) clinical / shift goals:      Problem: Impaired Gas Exchange  Goal: Patient will demonstrate improved ventilation and adequate oxygenation and participate in treatment regimen within the level of ability/situation.  Outcome: Progressing  Flowsheets (Taken 11/21/2022 0108)  Impaired Gas Exchange:   Assesed rate/rhythm/depth of effort of respirations   Assessed oxygenation   Turn, cough, and deep breathe   Assessed vital signs, pulse oximetry   Assessed color and body temperature   Assessed breath sounds   Elevated head of bed   Provided a calm, quiet environment   Asssited patient resume activity as tolerated     Problem: Nutrition - Advanced  Goal: Patient will display progressive weight gain toward goal have adequate food and fluid intake  Outcome: Progressing     Problem: Self Care  Goal: Patient will have the ability to perform ADLs independently or with assistance (bathe, groom, dress, toilet and feed)  Outcome: Progressing

## 2022-11-21 NOTE — HOSPITAL COURSE
Beni Moore is a 52 y.o. male with a history of diabetes, hypertension, dyslipidemia, bilateral toe amputations, coronary artery disease with a history of stent placed 2019, obesity, likely SOBIA, diabetic gastric issues who presented 11/17/2022 with lethargy and increasing weakness as well as 50 pound weight gain over the past year with leg edema.  Also reports of episode of hypoglycemia.  Chest x-ray consistent with volume overload, small right sided pleural effusion, diffuse pleural thickening, atelectasis.  Labs remarkable for elevated BNP, troponin trended flat on 101.  Noted AXEL.  EKG with nonspecific ST changes.  Repeated echocardiogram unremarkable.  Patient was evaluated by nephrology for worsening renal function likely in setting of nephrotic syndrome.

## 2022-11-21 NOTE — PROGRESS NOTES
Nephrology Daily Progress Note    Date of Service  11/21/2022    Chief Complaint  52 y.o. male with CKD IIIa, diabetic nephropathy,  admitted 11/17/2022 with worsening SOB, consulted for worsening creat level    Interval Problem Update  11/20 -feels better, still mild SOB with wheezes  Edema slightly better  Good UOP  Creat level improving  11/21 patient still complaining of significant anasarca  No chest pain, no shortness of breath  Review of Systems  Review of Systems   Constitutional:  Negative for chills, fever and malaise/fatigue.   Respiratory:  Positive for wheezing. Negative for cough and shortness of breath.    Cardiovascular:  Positive for leg swelling. Negative for chest pain.   Gastrointestinal:  Negative for nausea and vomiting.   Genitourinary:  Negative for dysuria, frequency and urgency.   All other systems reviewed and are negative.     Physical Exam  Temp:  [36.5 °C (97.7 °F)-36.8 °C (98.2 °F)] 36.5 °C (97.7 °F)  Pulse:  [73-85] 73  Resp:  [17-19] 18  BP: (143-165)/(83-89) 143/83  SpO2:  [95 %-97 %] 95 %    Physical Exam  Vitals and nursing note reviewed.   Constitutional:       General: He is awake.      Appearance: He is not ill-appearing.   HENT:      Head: Normocephalic and atraumatic.      Right Ear: External ear normal.      Left Ear: External ear normal.      Nose: Nose normal.      Mouth/Throat:      Pharynx: No oropharyngeal exudate or posterior oropharyngeal erythema.   Eyes:      General:         Right eye: No discharge.         Left eye: No discharge.      Conjunctiva/sclera: Conjunctivae normal.   Cardiovascular:      Rate and Rhythm: Normal rate and regular rhythm.   Pulmonary:      Effort: Pulmonary effort is normal. No respiratory distress.      Breath sounds: Wheezing present.   Abdominal:      General: Abdomen is flat. Bowel sounds are normal.   Musculoskeletal:         General: No tenderness.      Cervical back: No rigidity. No muscular tenderness.      Right lower leg: Edema  present.      Left lower leg: Edema present.      Comments: Anasarca   Skin:     General: Skin is warm and dry.      Coloration: Skin is not jaundiced.      Findings: No lesion or rash.   Neurological:      General: No focal deficit present.      Mental Status: He is alert and oriented to person, place, and time. Mental status is at baseline.   Psychiatric:         Mood and Affect: Mood normal.         Behavior: Behavior normal.         Thought Content: Thought content normal.       Fluids    Intake/Output Summary (Last 24 hours) at 11/21/2022 1115  Last data filed at 11/21/2022 0553  Gross per 24 hour   Intake --   Output 2450 ml   Net -2450 ml         Laboratory  Recent Labs     11/20/22  0109   WBC 6.0   RBC 4.03*   HEMOGLOBIN 12.1*   HEMATOCRIT 35.5*   MCV 88.1   MCH 30.0   MCHC 34.1   RDW 41.3   PLATELETCT 187   MPV 10.7       Recent Labs     11/19/22  0056 11/20/22  0109 11/21/22  0326   SODIUM 141 137 139   POTASSIUM 3.5* 4.2 4.1   CHLORIDE 110 107 110   CO2 24 21 23   GLUCOSE 173* 316* 183*   BUN 26* 27* 37*   CREATININE 1.85* 1.73* 2.00*   CALCIUM 7.6* 8.1* 7.8*           No results for input(s): NTPROBNP in the last 72 hours.          Imaging  EC-ECHOCARDIOGRAM COMPLETE W/O CONT   Final Result      CT-HEAD W/O   Final Result      Head CT without contrast within normal limits. No evidence of acute cerebral infarction, hemorrhage or mass lesion.         DX-CHEST-PORTABLE (1 VIEW)   Final Result      1.  Small right-sided pleural effusion and right lateral pleural thickening.      2.  Faint opacification throughout the right lower hemithorax consistent with diffuse pleural thickening, atelectasis, and/or pneumonitis.           Assessment/Plan  1.AXEL/CKD IIIa : Creatinine is worse today, I believe patient creatinine is underestimating his kidney function considering significant anasarca and volume overload.  I will anticipate his creatinine to continue to increase while we are optimizing intravascular  volume.  2.HTN: Uncontrolled  3.Electrolytes: Okay.  4.Anemia.  5.Diabetic nephropathy with nephrotic range proteinuria -to restart ACEi  6.Volume overload .  7.  Proteinuria  8.  Hypoalbuminemia  Recs:  no acute need for HD, evaluate daily  Increase diuretics to 80 mg twice a day.  Restart enalapril  Discontinue amlodipine  Renal diet  Daily labs  Renal dose all meds  Avoid nephrotoxins  Prognosis guarded.

## 2022-11-21 NOTE — PROGRESS NOTES
Hospital Medicine Daily Progress Note    Date of Service  11/21/2022    Chief Complaint  Beni Moore is a 52 y.o. male admitted 11/17/2022 with shortness of breath    Hospital Course  Beni Moore is a 52 y.o. male with a history of diabetes, hypertension, dyslipidemia, bilateral toe amputations, coronary artery disease with a history of stent placed 2019, obesity, likely SOBIA, diabetic gastric issues who presented 11/17/2022 with lethargy and increasing weakness as well as 50 pound weight gain over the past year with leg edema.  Also reports of episode of hypoglycemia.  Chest x-ray consistent with volume overload, small right sided pleural effusion, diffuse pleural thickening, atelectasis.  Labs remarkable for elevated BNP, troponin trended flat on 101.  Noted AXEL.  EKG with nonspecific ST changes.  Repeated echocardiogram unremarkable.  Patient was evaluated by nephrology for worsening renal function likely in setting of nephrotic syndrome.      Interval Problem Update  11/18/2022  Vitals remained stable.  Currently on room air saturating over 90  Labs reviewed.  Noted AXEL    Continue Lasix  Nephrology evaluation if kidney function continue to worsen(baseline creatinine around 1.4)    Pending echocardiogram    11/19/2022  Vitals stable   On room air   Reports short of breathe   Worsening renal function . Last evauted by Dr Ortiz .  Lucentis held.    Wheezing on exam   Added suo neb and steroid  Echo unremarkable   Continue lasix    Nephrology Dr Plascencia  to evaluate     11/20/2022  Hypertensive  On room air saturating over 90  Kidney function slightly improved  Insulin regimen adjusted    Elevated protein creatinine ratio.  Significant proteinuria 6 g  Resume ACEI tomorrow if kidney function continue improving  Continue Lasix  Nephrology following    11/21/2022    Vitals remained stable  Remained asymptomatic  Kidney function continue worsen  No change in lower extremity edema  IV Lasix changed to 80 mg IV  twice daily  Nephrology following closely      I have discussed this patient's plan of care and discharge plan at IDT rounds today with Case Management, Nursing, Nursing leadership, and other members of the IDT team.        Code Status  Full Code    Disposition  Patient is not medically cleared for discharge.   Anticipate discharge to to home with close outpatient follow-up.  I have placed the appropriate orders for post-discharge needs.    Review of Systems  Review of Systems   Constitutional:  Negative for fever.   HENT:  Negative for hearing loss.    Eyes:  Negative for blurred vision.   Respiratory:  Positive for shortness of breath. Negative for cough.    Cardiovascular:  Positive for leg swelling. Negative for chest pain.   Gastrointestinal:  Negative for nausea and vomiting.   Genitourinary:  Negative for dysuria.   Musculoskeletal:  Negative for myalgias.   Skin:  Negative for rash.   Neurological:  Negative for dizziness.   Endo/Heme/Allergies:  Does not bruise/bleed easily.      Physical Exam  Temp:  [36.5 °C (97.7 °F)-36.8 °C (98.2 °F)] 36.5 °C (97.7 °F)  Pulse:  [73-85] 73  Resp:  [17-19] 18  BP: (143-165)/(83-89) 143/83  SpO2:  [95 %-97 %] 95 %    Physical Exam  Constitutional:       General: He is not in acute distress.     Appearance: He is obese.   HENT:      Head: Normocephalic and atraumatic.      Right Ear: Tympanic membrane normal.      Left Ear: Tympanic membrane normal.      Nose: Nose normal.      Mouth/Throat:      Mouth: Mucous membranes are moist.   Eyes:      Extraocular Movements: Extraocular movements intact.      Pupils: Pupils are equal, round, and reactive to light.   Cardiovascular:      Rate and Rhythm: Normal rate and regular rhythm.      Pulses: Normal pulses.   Pulmonary:      Effort: Pulmonary effort is normal. No respiratory distress.      Breath sounds: Wheezing present.   Abdominal:      General: Abdomen is flat. There is no distension.   Musculoskeletal:      Cervical back:  Normal range of motion.      Right lower leg: Edema present.      Left lower leg: Edema present.      Comments: Right metatarsal  amputation    Skin:     General: Skin is warm.   Neurological:      General: No focal deficit present.      Mental Status: He is alert and oriented to person, place, and time. Mental status is at baseline.   Psychiatric:         Mood and Affect: Mood normal.       Fluids    Intake/Output Summary (Last 24 hours) at 11/21/2022 1358  Last data filed at 11/21/2022 1300  Gross per 24 hour   Intake --   Output 2550 ml   Net -2550 ml         Laboratory  Recent Labs     11/20/22  0109   WBC 6.0   RBC 4.03*   HEMOGLOBIN 12.1*   HEMATOCRIT 35.5*   MCV 88.1   MCH 30.0   MCHC 34.1   RDW 41.3   PLATELETCT 187   MPV 10.7       Recent Labs     11/19/22  0056 11/20/22  0109 11/21/22  0326   SODIUM 141 137 139   POTASSIUM 3.5* 4.2 4.1   CHLORIDE 110 107 110   CO2 24 21 23   GLUCOSE 173* 316* 183*   BUN 26* 27* 37*   CREATININE 1.85* 1.73* 2.00*   CALCIUM 7.6* 8.1* 7.8*                     Imaging  EC-ECHOCARDIOGRAM COMPLETE W/O CONT   Final Result      CT-HEAD W/O   Final Result      Head CT without contrast within normal limits. No evidence of acute cerebral infarction, hemorrhage or mass lesion.         DX-CHEST-PORTABLE (1 VIEW)   Final Result      1.  Small right-sided pleural effusion and right lateral pleural thickening.      2.  Faint opacification throughout the right lower hemithorax consistent with diffuse pleural thickening, atelectasis, and/or pneumonitis.             Assessment/Plan  * Acute heart failure, unspecified heart failure type (HCC)- (present on admission)  Assessment & Plan  BNP:970, troponin:109  Last echo 4/2022 with preserved EF  Low albumin:2.2   Lasix and aldactone as BP and renal function allows.    Repeat echocardiogram unremarkable  Unlikely heart failure  Current clinical condition likely from nephrotic syndrome causing lower extremity edema    Wheezing  Assessment &  Plan  Need outpatient follow-up with pulmonology for PFT  Complete 5 days course of prednisone  On DuoNeb      Troponin I above reference range  Assessment & Plan  Elevated troponin likely in setting of demand ischemia and due to underlying AXEL, EKG unremarkable  Unlikely ACS  Denies chest pain  Continue to trend troponin  Telemetry monitoring, monitor EKG   echocardiogram unremarkable         Serum albumin decreased  Assessment & Plan  11/17 Alb:2.2  Check UA for proteinuria. Rule out nephrotic syndrome.    Renal insufficiency  Assessment & Plan  He has followed up with Dr Ortiz in the past for nephrology  Patient states his ocular medication was causing some renal dysfunction along with his diabeties  Monitor bmp, vitals, I/O's    Urinalysis noted for significant proteinuria  On IV Lasix  Monitor renal function      Body mass index (BMI) 38.0-38.9, adult- (present on admission)  Assessment & Plan  Body mass index is 38.52 kg/m².  Component of water weight from volume overload.  Monitor for euvolemic weight    Presence of stent in coronary artery- (present on admission)  Assessment & Plan  As per CAD management    Type 2 diabetes mellitus with hyperglycemia, with long-term current use of insulin (HCC)- (present on admission)  Assessment & Plan  Per patient episode of hypoglycemia 11/17 am.  Hold trulicity, glargine and glimipiride  Monitor accuchecks and cover with SSI  11/11/22 A1c:5.5  Hypoglycemic protocol  Diabetic diet.  Repeat A1c      Coronary artery disease involving native coronary artery of native heart without angina pectoris- (present on admission)  Assessment & Plan  Medical management with aspirin 81mg daily, coreg 12.5mg BID, atorvastatin 80mg nightly.  Elevated troponin: 109 and will follow serial troponins x 3  EKG reviewed  Monitor on telemetry  Follows outpatient with Dr Andrew Engel    S/P transmetatarsal amputation of foot, right (HCC)- (present on admission)  Assessment & Plan  No acute wounds  noted on bilateral feet       VTE prophylaxis: SCDs/TEDs and heparin ppx    I have performed a physical exam and reviewed and updated ROS and Plan today (11/21/2022). In review of yesterday's note (11/20/2022), there are no changes except as documented above.

## 2022-11-21 NOTE — DOCUMENTATION QUERY
[facility]                                                                       Query Response Note      PATIENT:               HELIO BARROW  ACCT #:                  [id number]  MRN:                     [id number]  :                      1970  ADMIT DATE:       2022 12:28 PM  DISCH DATE:          RESPONDING  PROVIDER #:        [id number]           QUERY TEXT:    Acute Congestive Heart Failure is documented in the Medical Record. Please document the type.     NOTE:  If an appropriate response is not listed below, please respond with a new note.    Thank you.    The patient's Clinical Indicators include:   Hospitalist Note: Acute heart failure.   ECHO: EF 60%    Risk factor: Hypertension    Treatment: IV Lasix    Thank you,  Wilfrido Manzo  Clinical   Connect via [employer]  Options provided:   -- Acute diastolic heart failure   -- Other heart failure, Please specify   -- Other explanation, Please specify   -- Unable to determine      Query created by: Wilfrido Manzo on 2022 1:03 PM    RESPONSE TEXT:    Unable to determine          Electronically signed by:  HUGH RADFORD MD 2022 1:58 PM

## 2022-11-21 NOTE — DISCHARGE PLANNING
Case Management Discharge Planning    Admission Date: 11/17/2022  GMLOS: 2.1  ALOS: 4    6-Clicks ADL Score: 24  6-Clicks Mobility Score: 18      Anticipated Discharge Dispo: Discharge Disposition: Discharged to home/self care (01)    DME Needed: No    Action(s) Taken: Pt pending medical clearance, pt on room air, pt lives in [place], [place], with spouse and has a PCP. No case management needs identified.    Escalations Completed: None    Medically Clear: No    Next Steps: f/u with medical team and pt to discuss dc needs and barriers.    Barriers to Discharge: Medical clearance

## 2022-11-22 LAB
ANION GAP SERPL CALC-SCNC: 7 MMOL/L (ref 7–16)
BUN SERPL-MCNC: 41 MG/DL (ref 8–22)
CALCIUM SERPL-MCNC: 7.7 MG/DL (ref 8.5–10.5)
CHLORIDE SERPL-SCNC: 109 MMOL/L (ref 96–112)
CO2 SERPL-SCNC: 22 MMOL/L (ref 20–33)
CREAT SERPL-MCNC: 1.84 MG/DL (ref 0.5–1.4)
GFR SERPLBLD CREATININE-BSD FMLA CKD-EPI: 43 ML/MIN/1.73 M 2
GLUCOSE BLD STRIP.AUTO-MCNC: 101 MG/DL (ref 65–99)
GLUCOSE BLD STRIP.AUTO-MCNC: 168 MG/DL (ref 65–99)
GLUCOSE BLD STRIP.AUTO-MCNC: 216 MG/DL (ref 65–99)
GLUCOSE BLD STRIP.AUTO-MCNC: 235 MG/DL (ref 65–99)
GLUCOSE SERPL-MCNC: 130 MG/DL (ref 65–99)
POTASSIUM SERPL-SCNC: 3.9 MMOL/L (ref 3.6–5.5)
SODIUM SERPL-SCNC: 138 MMOL/L (ref 135–145)

## 2022-11-22 PROCEDURE — 99233 SBSQ HOSP IP/OBS HIGH 50: CPT | Performed by: STUDENT IN AN ORGANIZED HEALTH CARE EDUCATION/TRAINING PROGRAM

## 2022-11-22 PROCEDURE — 99233 SBSQ HOSP IP/OBS HIGH 50: CPT | Performed by: INTERNAL MEDICINE

## 2022-11-22 PROCEDURE — 700111 HCHG RX REV CODE 636 W/ 250 OVERRIDE (IP): Performed by: HOSPITALIST

## 2022-11-22 PROCEDURE — A9270 NON-COVERED ITEM OR SERVICE: HCPCS | Performed by: INTERNAL MEDICINE

## 2022-11-22 PROCEDURE — 700111 HCHG RX REV CODE 636 W/ 250 OVERRIDE (IP): Performed by: STUDENT IN AN ORGANIZED HEALTH CARE EDUCATION/TRAINING PROGRAM

## 2022-11-22 PROCEDURE — A9270 NON-COVERED ITEM OR SERVICE: HCPCS | Performed by: HOSPITALIST

## 2022-11-22 PROCEDURE — 700102 HCHG RX REV CODE 250 W/ 637 OVERRIDE(OP): Performed by: INTERNAL MEDICINE

## 2022-11-22 PROCEDURE — 700102 HCHG RX REV CODE 250 W/ 637 OVERRIDE(OP): Performed by: HOSPITALIST

## 2022-11-22 PROCEDURE — 82962 GLUCOSE BLOOD TEST: CPT

## 2022-11-22 PROCEDURE — 80048 BASIC METABOLIC PNL TOTAL CA: CPT

## 2022-11-22 PROCEDURE — 700111 HCHG RX REV CODE 636 W/ 250 OVERRIDE (IP): Performed by: INTERNAL MEDICINE

## 2022-11-22 PROCEDURE — 770020 HCHG ROOM/CARE - TELE (206)

## 2022-11-22 RX ADMIN — CHOLESTYRAMINE 4 G: 4 POWDER, FOR SUSPENSION ORAL at 19:03

## 2022-11-22 RX ADMIN — CHOLESTYRAMINE 4 G: 4 POWDER, FOR SUSPENSION ORAL at 10:36

## 2022-11-22 RX ADMIN — FUROSEMIDE 60 MG: 40 TABLET ORAL at 14:17

## 2022-11-22 RX ADMIN — INSULIN LISPRO 2 UNITS: 100 INJECTION, SOLUTION INTRAVENOUS; SUBCUTANEOUS at 20:47

## 2022-11-22 RX ADMIN — FUROSEMIDE 80 MG: 10 INJECTION, SOLUTION INTRAMUSCULAR; INTRAVENOUS at 06:18

## 2022-11-22 RX ADMIN — PREDNISONE 40 MG: 20 TABLET ORAL at 06:14

## 2022-11-22 RX ADMIN — ATORVASTATIN CALCIUM 80 MG: 80 TABLET, FILM COATED ORAL at 16:34

## 2022-11-22 RX ADMIN — ASPIRIN 81 MG: 81 TABLET, COATED ORAL at 06:14

## 2022-11-22 RX ADMIN — ENALAPRIL MALEATE 20 MG: 10 TABLET ORAL at 16:34

## 2022-11-22 RX ADMIN — INSULIN LISPRO 2 UNITS: 100 INJECTION, SOLUTION INTRAVENOUS; SUBCUTANEOUS at 16:40

## 2022-11-22 RX ADMIN — INSULIN LISPRO 8 UNITS: 100 INJECTION, SOLUTION INTRAVENOUS; SUBCUTANEOUS at 11:32

## 2022-11-22 RX ADMIN — POTASSIUM CHLORIDE 20 MEQ: 1500 TABLET, EXTENDED RELEASE ORAL at 06:14

## 2022-11-22 RX ADMIN — HEPARIN SODIUM 5000 UNITS: 5000 INJECTION, SOLUTION INTRAVENOUS; SUBCUTANEOUS at 14:17

## 2022-11-22 RX ADMIN — OMEPRAZOLE 20 MG: 20 CAPSULE, DELAYED RELEASE ORAL at 06:15

## 2022-11-22 RX ADMIN — POTASSIUM CHLORIDE 20 MEQ: 1500 TABLET, EXTENDED RELEASE ORAL at 16:34

## 2022-11-22 RX ADMIN — HEPARIN SODIUM 5000 UNITS: 5000 INJECTION, SOLUTION INTRAVENOUS; SUBCUTANEOUS at 06:18

## 2022-11-22 RX ADMIN — HEPARIN SODIUM 5000 UNITS: 5000 INJECTION, SOLUTION INTRAVENOUS; SUBCUTANEOUS at 20:44

## 2022-11-22 RX ADMIN — CARVEDILOL 12.5 MG: 12.5 TABLET, FILM COATED ORAL at 06:17

## 2022-11-22 RX ADMIN — INSULIN LISPRO 1 UNITS: 100 INJECTION, SOLUTION INTRAVENOUS; SUBCUTANEOUS at 11:33

## 2022-11-22 RX ADMIN — CARVEDILOL 12.5 MG: 12.5 TABLET, FILM COATED ORAL at 16:34

## 2022-11-22 RX ADMIN — INSULIN LISPRO 8 UNITS: 100 INJECTION, SOLUTION INTRAVENOUS; SUBCUTANEOUS at 16:41

## 2022-11-22 RX ADMIN — INSULIN LISPRO 8 UNITS: 100 INJECTION, SOLUTION INTRAVENOUS; SUBCUTANEOUS at 06:24

## 2022-11-22 RX ADMIN — ENALAPRIL MALEATE 20 MG: 10 TABLET ORAL at 06:15

## 2022-11-22 ASSESSMENT — ENCOUNTER SYMPTOMS
SHORTNESS OF BREATH: 1
BLURRED VISION: 0
DIZZINESS: 0
VOMITING: 0
MYALGIAS: 0
CHILLS: 0
COUGH: 0
SHORTNESS OF BREATH: 0
FEVER: 0
NAUSEA: 0
BRUISES/BLEEDS EASILY: 0

## 2022-11-22 ASSESSMENT — PAIN DESCRIPTION - PAIN TYPE
TYPE: ACUTE PAIN
TYPE: ACUTE PAIN

## 2022-11-22 ASSESSMENT — FIBROSIS 4 INDEX: FIB4 SCORE: 2.24

## 2022-11-22 NOTE — PROGRESS NOTES
Hospital Medicine Daily Progress Note    Date of Service  11/22/2022    Chief Complaint  Beni Moore is a 52 y.o. male admitted 11/17/2022 with shortness of breath    Hospital Course  Beni Moore is a 52 y.o. male with a history of diabetes, hypertension, dyslipidemia, bilateral toe amputations, coronary artery disease with a history of stent placed 2019, obesity, likely SOBIA, diabetic gastric issues who presented 11/17/2022 with lethargy and increasing weakness as well as 50 pound weight gain over the past year with leg edema.  Also reports of episode of hypoglycemia.  Chest x-ray consistent with volume overload, small right sided pleural effusion, diffuse pleural thickening, atelectasis.  Labs remarkable for elevated BNP, troponin trended flat on 101.  Noted AXEL.  EKG with nonspecific ST changes.  Repeated echocardiogram unremarkable.  Patient was evaluated by nephrology for worsening renal function likely in setting of nephrotic syndrome.    11/18/2022  Vitals remained stable.  Currently on room air saturating over 90  Labs reviewed.  Noted AXEL    Continue Lasix  Nephrology evaluation if kidney function continue to worsen(baseline creatinine around 1.4)    Pending echocardiogram    11/19/2022  Vitals stable   On room air   Reports short of breathe   Worsening renal function . Last evauted by Dr Ortiz .  Lucentis held.    Wheezing on exam   Added suo neb and steroid  Echo unremarkable   Continue lasix    Nephrology Dr Plascencia  to evaluate     11/20/2022  Hypertensive  On room air saturating over 90  Kidney function slightly improved  Insulin regimen adjusted    Elevated protein creatinine ratio.  Significant proteinuria 6 g  Resume ACEI tomorrow if kidney function continue improving  Continue Lasix  Nephrology following    11/21/2022    Vitals remained stable  Remained asymptomatic  Kidney function continue worsen  No change in lower extremity edema  IV Lasix changed to 80 mg IV twice daily  Nephrology  following closely    Interval Problem Update  Patient mother bedside  Patient no acute distress, reporting improvement with lower extremity edema  Stable vitals and saturating well on room air  Creatinine trending down slowly  Fluid restriction  Restart home enalapril   Switch to oral Lasix  Nephrology following, appreciate recommendations  Labs on a.m.    I have discussed this patient's plan of care and discharge plan at IDT rounds today with Case Management, Nursing, Nursing leadership, and other members of the IDT team.        Code Status  Full Code    Disposition  Patient is not medically cleared for discharge.   Anticipate discharge to to home with close outpatient follow-up.  I have placed the appropriate orders for post-discharge needs.    Review of Systems  Review of Systems   Constitutional:  Negative for fever.   HENT:  Negative for hearing loss.    Eyes:  Negative for blurred vision.   Respiratory:  Positive for shortness of breath. Negative for cough.    Cardiovascular:  Positive for leg swelling. Negative for chest pain.   Gastrointestinal:  Negative for nausea and vomiting.   Genitourinary:  Negative for dysuria.   Musculoskeletal:  Negative for myalgias.   Skin:  Negative for rash.   Neurological:  Negative for dizziness.   Endo/Heme/Allergies:  Does not bruise/bleed easily.      Physical Exam  Temp:  [36.6 °C (97.9 °F)-36.7 °C (98.1 °F)] 36.6 °C (97.9 °F)  Pulse:  [74-77] 75  Resp:  [16-18] 16  BP: (138-159)/(78-88) 139/78  SpO2:  [95 %-97 %] 96 %    Physical Exam  Constitutional:       General: He is not in acute distress.     Appearance: He is obese.   HENT:      Head: Normocephalic and atraumatic.      Right Ear: Tympanic membrane normal.      Left Ear: Tympanic membrane normal.      Nose: Nose normal.      Mouth/Throat:      Mouth: Mucous membranes are moist.   Eyes:      Extraocular Movements: Extraocular movements intact.      Pupils: Pupils are equal, round, and reactive to light.    Cardiovascular:      Rate and Rhythm: Normal rate and regular rhythm.      Pulses: Normal pulses.   Pulmonary:      Effort: Pulmonary effort is normal. No respiratory distress.      Breath sounds: Wheezing present.   Abdominal:      General: Abdomen is flat. There is no distension.   Musculoskeletal:      Cervical back: Normal range of motion.      Right lower leg: Edema present.      Left lower leg: Edema present.      Comments: Right metatarsal  amputation    Skin:     General: Skin is warm.   Neurological:      General: No focal deficit present.      Mental Status: He is alert and oriented to person, place, and time. Mental status is at baseline.   Psychiatric:         Mood and Affect: Mood normal.       Fluids    Intake/Output Summary (Last 24 hours) at 11/22/2022 1516  Last data filed at 11/22/2022 1100  Gross per 24 hour   Intake 240 ml   Output 3725 ml   Net -3485 ml       Laboratory  Recent Labs     11/20/22  0109   WBC 6.0   RBC 4.03*   HEMOGLOBIN 12.1*   HEMATOCRIT 35.5*   MCV 88.1   MCH 30.0   MCHC 34.1   RDW 41.3   PLATELETCT 187   MPV 10.7     Recent Labs     11/20/22  0109 11/21/22  0326 11/22/22  0303   SODIUM 137 139 138   POTASSIUM 4.2 4.1 3.9   CHLORIDE 107 110 109   CO2 21 23 22   GLUCOSE 316* 183* 130*   BUN 27* 37* 41*   CREATININE 1.73* 2.00* 1.84*   CALCIUM 8.1* 7.8* 7.7*                   Imaging  EC-ECHOCARDIOGRAM COMPLETE W/O CONT   Final Result      CT-HEAD W/O   Final Result      Head CT without contrast within normal limits. No evidence of acute cerebral infarction, hemorrhage or mass lesion.         DX-CHEST-PORTABLE (1 VIEW)   Final Result      1.  Small right-sided pleural effusion and right lateral pleural thickening.      2.  Faint opacification throughout the right lower hemithorax consistent with diffuse pleural thickening, atelectasis, and/or pneumonitis.             Assessment/Plan  * Renal insufficiency- (present on admission)  Assessment & Plan  Creatinine trending  down  Fluid restriction  Restart home enalapril   Switch to oral Lasix  Nephrology following, appreciate recommendations  Labs on a.m.    Acute heart failure, unspecified heart failure type (HCC)- (present on admission)  Assessment & Plan  BNP:970, troponin:109  Last echo 4/2022 with preserved EF  Low albumin:2.2   Lasix and aldactone as BP and renal function allows.    Repeat echocardiogram unremarkable  Unlikely heart failure  Current clinical condition likely from nephrotic syndrome causing lower extremity edema    Wheezing- (present on admission)  Assessment & Plan  Need outpatient follow-up with pulmonology for PFT  Complete 5 days course of prednisone  On DuoNeb      Troponin I above reference range- (present on admission)  Assessment & Plan  Elevated troponin likely in setting of demand ischemia and due to underlying AXEL, EKG unremarkable  Unlikely ACS  Denies chest pain  Continue to trend troponin  Telemetry monitoring, monitor EKG   echocardiogram unremarkable     Serum albumin decreased- (present on admission)  Assessment & Plan  11/17 Alb:2.2  Check UA for proteinuria. Rule out nephrotic syndrome.    Body mass index (BMI) 38.0-38.9, adult- (present on admission)  Assessment & Plan  Body mass index is 38.52 kg/m².  Component of water weight from volume overload.  Monitor for euvolemic weight    Presence of stent in coronary artery- (present on admission)  Assessment & Plan  As per CAD management    Type 2 diabetes mellitus with hyperglycemia, with long-term current use of insulin (HCC)- (present on admission)  Assessment & Plan  Per patient episode of hypoglycemia 11/17 am.  Hold trulicity, glargine and glimipiride  Monitor accuchecks and cover with SSI  11/11/22 A1c:5.5  Hypoglycemic protocol  Diabetic diet.  Repeat A1c      Coronary artery disease involving native coronary artery of native heart without angina pectoris- (present on admission)  Assessment & Plan  Medical management with aspirin 81mg  daily, coreg 12.5mg BID, atorvastatin 80mg nightly.  Elevated troponin: 109 and will follow serial troponins x 3  EKG reviewed  Monitor on telemetry  Follows outpatient with Dr Andrew Engel    S/P transmetatarsal amputation of foot, right (HCC)- (present on admission)  Assessment & Plan  No acute wounds noted on bilateral feet       VTE prophylaxis: SCDs/TEDs and heparin ppx    I have performed a physical exam and reviewed and updated ROS and Plan today (11/22/2022). In review of yesterday's note (11/21/2022), there are no changes except as documented above.

## 2022-11-22 NOTE — PROGRESS NOTES
Assumed care of patient. Report received from JULIANO Deluna. Patient A&Ox 4. Call light within reach, bed locked and in lowest position. Bed alarm on, instructed patient to call for assistance. Patient rating pain 0/10. Questions answered, no needs at this time.

## 2022-11-22 NOTE — CARE PLAN
The patient is Stable - Low risk of patient condition declining or worsening    Shift Goals  Clinical Goals: diurese  Patient Goals: rest    Progress made toward(s) clinical / shift goals:      Problem: Knowledge Deficit - COPD  Goal: Patient/significant other demonstrates understanding of disease process, utilization of the Action Plan, medications and discharge instruction  Outcome: Progressing     Problem: Impaired Gas Exchange  Goal: Patient will demonstrate improved ventilation and adequate oxygenation and participate in treatment regimen within the level of ability/situation.  Outcome: Progressing     Problem: Self Care  Goal: Patient will have the ability to perform ADLs independently or with assistance (bathe, groom, dress, toilet and feed)  Outcome: Progressing

## 2022-11-22 NOTE — PROGRESS NOTES
Nephrology Daily Progress Note    Date of Service  11/22/2022    Chief Complaint  52 y.o. male with CKD IIIa, diabetic nephropathy,  admitted 11/17/2022 with worsening SOB, consulted for worsening creat level    Interval Problem Update  11/20 -feels better, still mild SOB with wheezes  Edema slightly better  Good UOP  Creat level improving  11/21 patient still complaining of significant anasarca  No chest pain, no shortness of breath  11/22 patient feels better, still with significant anasarca  No chest pain, no shortness of breath.  Review of Systems  Review of Systems   Constitutional:  Negative for chills, fever and malaise/fatigue.   Respiratory:  Negative for cough and shortness of breath.    Cardiovascular:  Positive for leg swelling. Negative for chest pain.   Gastrointestinal:  Negative for nausea and vomiting.   Genitourinary:  Negative for dysuria, frequency and urgency.   All other systems reviewed and are negative.     Physical Exam  Temp:  [36.6 °C (97.9 °F)-36.7 °C (98.1 °F)] 36.6 °C (97.9 °F)  Pulse:  [74-77] 75  Resp:  [16-18] 16  BP: (138-159)/(78-88) 139/78  SpO2:  [95 %-97 %] 96 %    Physical Exam  Vitals and nursing note reviewed.   Constitutional:       General: He is awake.      Appearance: He is not ill-appearing.   HENT:      Head: Normocephalic and atraumatic.      Right Ear: External ear normal.      Left Ear: External ear normal.      Nose: Nose normal.      Mouth/Throat:      Pharynx: No oropharyngeal exudate or posterior oropharyngeal erythema.   Eyes:      General:         Right eye: No discharge.         Left eye: No discharge.      Conjunctiva/sclera: Conjunctivae normal.   Cardiovascular:      Rate and Rhythm: Normal rate and regular rhythm.   Pulmonary:      Effort: Pulmonary effort is normal. No respiratory distress.      Breath sounds: Normal breath sounds. No wheezing.   Abdominal:      General: Abdomen is flat. Bowel sounds are normal.   Musculoskeletal:         General: No  tenderness.      Cervical back: No rigidity. No muscular tenderness.      Right lower leg: Edema present.      Left lower leg: Edema present.   Skin:     General: Skin is warm and dry.      Coloration: Skin is not jaundiced.      Findings: No lesion or rash.   Neurological:      General: No focal deficit present.      Mental Status: He is alert and oriented to person, place, and time. Mental status is at baseline.   Psychiatric:         Mood and Affect: Mood normal.         Behavior: Behavior normal.         Thought Content: Thought content normal.       Fluids    Intake/Output Summary (Last 24 hours) at 11/22/2022 1235  Last data filed at 11/22/2022 1100  Gross per 24 hour   Intake 240 ml   Output 4825 ml   Net -4585 ml         Laboratory  Recent Labs     11/20/22  0109   WBC 6.0   RBC 4.03*   HEMOGLOBIN 12.1*   HEMATOCRIT 35.5*   MCV 88.1   MCH 30.0   MCHC 34.1   RDW 41.3   PLATELETCT 187   MPV 10.7       Recent Labs     11/20/22  0109 11/21/22  0326 11/22/22  0303   SODIUM 137 139 138   POTASSIUM 4.2 4.1 3.9   CHLORIDE 107 110 109   CO2 21 23 22   GLUCOSE 316* 183* 130*   BUN 27* 37* 41*   CREATININE 1.73* 2.00* 1.84*   CALCIUM 8.1* 7.8* 7.7*           No results for input(s): NTPROBNP in the last 72 hours.          Imaging  EC-ECHOCARDIOGRAM COMPLETE W/O CONT   Final Result      CT-HEAD W/O   Final Result      Head CT without contrast within normal limits. No evidence of acute cerebral infarction, hemorrhage or mass lesion.         DX-CHEST-PORTABLE (1 VIEW)   Final Result      1.  Small right-sided pleural effusion and right lateral pleural thickening.      2.  Faint opacification throughout the right lower hemithorax consistent with diffuse pleural thickening, atelectasis, and/or pneumonitis.           Assessment/Plan  1.AXEL/CKD IIIa : No uremic symptoms .  2.HTN: Uncontrolled  3.Electrolytes: Okay.  4.Anemia.  5.Diabetic nephropathy with nephrotic range proteinuria -to restart ACEi  6.Volume overload .  7.   Proteinuria  8.  Hypoalbuminemia  Recs:  no acute need for HD  Switch furosemide to oral anticipating discharge soon  Continue enalapril  Add minoxidil if no significant improvement in blood pressure tomorrow  Renal diet  Daily labs  Renal dose all meds  Avoid nephrotoxins  Prognosis guarded.

## 2022-11-23 ENCOUNTER — PHARMACY VISIT (OUTPATIENT)
Dept: PHARMACY | Facility: MEDICAL CENTER | Age: 52
End: 2022-11-23
Payer: COMMERCIAL

## 2022-11-23 VITALS
RESPIRATION RATE: 16 BRPM | WEIGHT: 269.84 LBS | BODY MASS INDEX: 36.55 KG/M2 | DIASTOLIC BLOOD PRESSURE: 84 MMHG | OXYGEN SATURATION: 97 % | HEART RATE: 74 BPM | HEIGHT: 72 IN | SYSTOLIC BLOOD PRESSURE: 154 MMHG | TEMPERATURE: 97.5 F

## 2022-11-23 LAB
ANION GAP SERPL CALC-SCNC: 8 MMOL/L (ref 7–16)
BUN SERPL-MCNC: 45 MG/DL (ref 8–22)
CALCIUM SERPL-MCNC: 7.9 MG/DL (ref 8.5–10.5)
CHLORIDE SERPL-SCNC: 109 MMOL/L (ref 96–112)
CO2 SERPL-SCNC: 23 MMOL/L (ref 20–33)
CREAT SERPL-MCNC: 1.97 MG/DL (ref 0.5–1.4)
GFR SERPLBLD CREATININE-BSD FMLA CKD-EPI: 40 ML/MIN/1.73 M 2
GLUCOSE BLD STRIP.AUTO-MCNC: 111 MG/DL (ref 65–99)
GLUCOSE BLD STRIP.AUTO-MCNC: 228 MG/DL (ref 65–99)
GLUCOSE BLD STRIP.AUTO-MCNC: 228 MG/DL (ref 65–99)
GLUCOSE SERPL-MCNC: 156 MG/DL (ref 65–99)
POTASSIUM SERPL-SCNC: 4.1 MMOL/L (ref 3.6–5.5)
SODIUM SERPL-SCNC: 140 MMOL/L (ref 135–145)

## 2022-11-23 PROCEDURE — 82962 GLUCOSE BLOOD TEST: CPT

## 2022-11-23 PROCEDURE — A9270 NON-COVERED ITEM OR SERVICE: HCPCS | Performed by: INTERNAL MEDICINE

## 2022-11-23 PROCEDURE — 700102 HCHG RX REV CODE 250 W/ 637 OVERRIDE(OP): Performed by: INTERNAL MEDICINE

## 2022-11-23 PROCEDURE — 99232 SBSQ HOSP IP/OBS MODERATE 35: CPT | Performed by: INTERNAL MEDICINE

## 2022-11-23 PROCEDURE — 80048 BASIC METABOLIC PNL TOTAL CA: CPT

## 2022-11-23 PROCEDURE — 99239 HOSP IP/OBS DSCHRG MGMT >30: CPT | Performed by: STUDENT IN AN ORGANIZED HEALTH CARE EDUCATION/TRAINING PROGRAM

## 2022-11-23 PROCEDURE — RXMED WILLOW AMBULATORY MEDICATION CHARGE: Performed by: STUDENT IN AN ORGANIZED HEALTH CARE EDUCATION/TRAINING PROGRAM

## 2022-11-23 PROCEDURE — 700102 HCHG RX REV CODE 250 W/ 637 OVERRIDE(OP): Performed by: HOSPITALIST

## 2022-11-23 PROCEDURE — 700111 HCHG RX REV CODE 636 W/ 250 OVERRIDE (IP): Performed by: STUDENT IN AN ORGANIZED HEALTH CARE EDUCATION/TRAINING PROGRAM

## 2022-11-23 PROCEDURE — 700111 HCHG RX REV CODE 636 W/ 250 OVERRIDE (IP): Performed by: HOSPITALIST

## 2022-11-23 PROCEDURE — A9270 NON-COVERED ITEM OR SERVICE: HCPCS | Performed by: HOSPITALIST

## 2022-11-23 RX ORDER — FUROSEMIDE 20 MG/1
60 TABLET ORAL 2 TIMES DAILY
Qty: 60 TABLET | Refills: 0 | Status: SHIPPED | OUTPATIENT
Start: 2022-11-23 | End: 2022-11-27

## 2022-11-23 RX ORDER — POTASSIUM CHLORIDE 20 MEQ/1
20 TABLET, EXTENDED RELEASE ORAL 2 TIMES DAILY
Qty: 60 TABLET | Refills: 0 | Status: SHIPPED | OUTPATIENT
Start: 2022-11-23 | End: 2023-01-10

## 2022-11-23 RX ADMIN — POTASSIUM CHLORIDE 20 MEQ: 1500 TABLET, EXTENDED RELEASE ORAL at 04:46

## 2022-11-23 RX ADMIN — FUROSEMIDE 60 MG: 40 TABLET ORAL at 04:46

## 2022-11-23 RX ADMIN — CHOLESTYRAMINE 4 G: 4 POWDER, FOR SUSPENSION ORAL at 10:07

## 2022-11-23 RX ADMIN — INSULIN LISPRO 2 UNITS: 100 INJECTION, SOLUTION INTRAVENOUS; SUBCUTANEOUS at 11:15

## 2022-11-23 RX ADMIN — ASPIRIN 81 MG: 81 TABLET, COATED ORAL at 04:45

## 2022-11-23 RX ADMIN — INSULIN LISPRO 8 UNITS: 100 INJECTION, SOLUTION INTRAVENOUS; SUBCUTANEOUS at 06:23

## 2022-11-23 RX ADMIN — INSULIN LISPRO 8 UNITS: 100 INJECTION, SOLUTION INTRAVENOUS; SUBCUTANEOUS at 11:16

## 2022-11-23 RX ADMIN — CARVEDILOL 12.5 MG: 12.5 TABLET, FILM COATED ORAL at 07:52

## 2022-11-23 RX ADMIN — OMEPRAZOLE 20 MG: 20 CAPSULE, DELAYED RELEASE ORAL at 04:46

## 2022-11-23 RX ADMIN — PREDNISONE 40 MG: 20 TABLET ORAL at 04:45

## 2022-11-23 RX ADMIN — HEPARIN SODIUM 5000 UNITS: 5000 INJECTION, SOLUTION INTRAVENOUS; SUBCUTANEOUS at 04:46

## 2022-11-23 RX ADMIN — ENALAPRIL MALEATE 20 MG: 10 TABLET ORAL at 04:46

## 2022-11-23 ASSESSMENT — PAIN DESCRIPTION - PAIN TYPE: TYPE: ACUTE PAIN

## 2022-11-23 ASSESSMENT — ENCOUNTER SYMPTOMS
FEVER: 0
VOMITING: 0
COUGH: 0
NAUSEA: 0
SHORTNESS OF BREATH: 0
CHILLS: 0

## 2022-11-23 NOTE — DISCHARGE INSTRUCTIONS
Discharge Instructions    Discharged to home by car with relative. Discharged via wheelchair, hospital escort: Yes.  Special equipment needed: Not Applicable    Be sure to schedule a follow-up appointment with your primary care doctor or any specialists as instructed.     Discharge Plan:   Diet Plan: Discussed  Activity Level: Discussed  Confirmed Follow up Appointment: Appointment Scheduled  Confirmed Symptoms Management: Discussed  Medication Reconciliation Updated: Yes  Influenza Vaccine Indication: Patient Refuses    I understand that a diet low in cholesterol, fat, and sodium is recommended for good health. Unless I have been given specific instructions below for another diet, I accept this instruction as my diet prescription.   Other diet: cardiac    Special Instructions:   HF Patient Discharge Instructions  Monitor your weight daily, and maintain a weight chart, to track your weight changes.   Activity as tolerated, unless your Doctor has ordered otherwise. Other activity order: reg.  Follow a low fat, low cholesterol, low salt diet unless instructed otherwise by your Doctor. Read the labels on the back of food products and track your intake of fat, cholesterol and salt.   Fluid Restriction Yes. If a Fluid Restriction has been ordered by your Doctor, measure fluids with a measuring cup to ensure that you are not exceeding the restriction.   No smoking.  Oxygen Yes. If your Doctor has ordered that you wear Oxygen at home, it is important to wear it as ordered.  Did you receive an explanation from staff on the importance of taking each of your medications and why it is necessary to keep taking them unless your doctor says to stop? Yes  Were all of your questions answered about how to manage your heart failure and what to do if you have increased signs and symptoms after you go home? Yes  Do you feel like your heart failure care team involved you in the care treatment plan and allowed you to make decisions  regarding your care while in the hospital and addressed any discharge needs you might have? Yes    See the educational handout provided at discharge for more information on monitoring your daily weight, activity and diet. This also explains more about Heart Failure, symptoms of a flare-up and some of the tests that you have undergone.     Warning Signs of a Flare-Up include:  Swelling in the ankles or lower legs.  Shortness of breath, while at rest, or while doing normal activities.   Shortness of breath at night when in bed, or coughing in bed.   Requiring more pillows to sleep at night, or needing to sit up at night to sleep.  Feeling weak, dizzy or fatigued.     When to call your Doctor:  Call [facility] seven days a week from 8:00 a.m. to 8:00 p.m. for medical questions [phone].  Call your Primary Care Physician or Cardiologist if:   You experience any pain radiating to your jaw or neck.  You have any difficulty breathing.  You experience weight gain of 3 lbs in a day or 5 lbs in a week.   You feel any palpitations or irregular heartbeats.  You become dizzy or lose consciousness.   If you have had an angiogram or had a pacemaker or AICD placed, and experience:  Bleeding, drainage or swelling at the surgical / puncture site.  Fever greater than 100.0 F  Shock from internal defibrillator.  Cool and / or numb extremities.     Please access the [facility] My HF Guide/Heart Failure Interactive Workbook:   [email or web address]    -Is this patient being discharged with medication to prevent blood clots?  No    Is patient discharged on Warfarin / Coumadin?   No

## 2022-11-23 NOTE — PROGRESS NOTES
Assumed care of patient, bedside report received from Ame HENAO. Updated on POC, call light within reach and fall precautions in place. Bed locked and in lowest position. Patient instructed to call for assistance before getting out of bed. All questions answered, no other needs at this time.

## 2022-11-23 NOTE — CARE PLAN
The patient is Stable - Low risk of patient condition declining or worsening    Shift Goals  Clinical Goals: monitor I/Os, diuresis  Patient Goals: discharge    Progress made toward(s) clinical / shift goals:      Problem: Care Map:  Day 3 Optimal Outcome for the Heart Failure Patient  Goal: Day 3:  Optimal Care of the heart failure patient  Outcome: Progressing  Note: RN discussed HF with pt and need to monitor strict I/Os. Edema assessed, bilateral LE 1+ edema. PO lasix given per MAR.      Problem: Knowledge Deficit - Standard  Goal: Patient and family/care givers will demonstrate understanding of plan of care, disease process/condition, diagnostic tests and medications  Outcome: Progressing     Problem: Self Care  Goal: Patient will have the ability to perform ADLs independently or with assistance (bathe, groom, dress, toilet and feed)  Outcome: Progressing  Note: Pt able to provide self care for all ADLs.        Patient is not progressing towards the following goals:

## 2022-11-23 NOTE — DISCHARGE SUMMARY
Discharge Summary    CHIEF COMPLAINT ON ADMISSION  Chief Complaint   Patient presents with    T-5000     Fell and hit head on hard wood floor at 0915 today. +81 mg ASA daily. -LOC, -head trauma, -dizziness. FSBG 80, OJ provided. Hx T2 DM.     Shortness of Breath     Onset this am. Hx MI, no CHF dx. BLE pitting edema. EMS medicated patient with 40 mg IV lasix PTA.        Reason for Admission  EMS     Admission Date  11/17/2022    CODE STATUS  Full Code    HPI & HOSPITAL COURSE  52 y.o. male with a history of diabetes, hypertension, dyslipidemia, bilateral toe amputations, coronary artery disease with a history of stent placed 2019, obesity, likely SOBIA, diabetic gastric issues who presented 11/17/2022 with lethargy and increasing weakness as well as 50 pound weight gain over the past year with leg edema.  Also reports of episode of hypoglycemia.  Chest x-ray consistent with volume overload, small right sided pleural effusion, diffuse pleural thickening, atelectasis.  Labs remarkable for elevated BNP, troponin trended flat on 101.  Noted AXEL.  EKG with nonspecific ST changes.  Repeated echocardiogram unremarkable.  Patient was evaluated by nephrology for worsening renal function and diabetic nephropathy with nephrotic range proteinuria.  Patient was started on diuresis with Lasix.  Nephrology following for which patient was ultimately transitioned to oral Lasix and his ACE inhibitor was resumed.  Renal function improved and as per nephrology's recommendation patient is to be discharged home and to follow-up with his primary care provider and nephrology outpatient setting.  Patient was discharged with oral Lasix.  All results and plan of action was cussed with the patient for she voiced understanding and agreement with the primary care team.  Patient was instructed to return to emergency department symptoms were to worsen.    Therefore, he is discharged in good and stable condition to home with close outpatient  follow-up.    The patient met 2-midnight criteria for an inpatient stay at the time of discharge.    Discharge Date  11/23/2022    FOLLOW UP ITEMS POST DISCHARGE  Primary care provider follow-up post hospital discharge care  Nephrology follow diabetic nephropathy care    DISCHARGE DIAGNOSES  Principal Problem:    Renal insufficiency POA: Yes  Active Problems:    Acute heart failure, unspecified heart failure type (HCC) POA: Yes    S/P transmetatarsal amputation of foot, right (HCC) POA: Yes    Coronary artery disease involving native coronary artery of native heart without angina pectoris POA: Yes    Type 2 diabetes mellitus with hyperglycemia, with long-term current use of insulin (HCC) (Chronic) POA: Yes    Presence of stent in coronary artery POA: Yes    Body mass index (BMI) 38.0-38.9, adult POA: Yes    Serum albumin decreased POA: Yes    Troponin I above reference range POA: Yes    Wheezing POA: Yes  Resolved Problems:    Elevated troponin POA: Yes      FOLLOW UP  Future Appointments   Date Time Provider Department Center   11/29/2022 10:00 AM Florinda Pascual M.D. [facility]   12/7/2022  8:15 AM Rl Engel M.D. [facility] None     No follow-up provider specified.    MEDICATIONS ON DISCHARGE     Medication List        START taking these medications        Instructions   potassium chloride SA 20 MEQ Tbcr  Commonly known as: Kdur   Take 1 Tablet by mouth 2 times a day.  Dose: 20 mEq            CHANGE how you take these medications        Instructions   furosemide 20 MG Tabs  What changed:   medication strength  how much to take  Commonly known as: LASIX   Take 3 Tablets by mouth 2 times a day.  Dose: 60 mg            CONTINUE taking these medications        Instructions   aspirin 81 MG EC tablet   TAKE 1 TABLET BY MOUTH ONCE DAILY     atorvastatin 80 MG tablet  Commonly known as: LIPITOR   TAKE 1 TABLET EVERY EVENING     carvedilol 12.5 MG Tabs  Commonly known as: COREG   Take 1 Tablet by mouth 2 times a  day with meals. Please call [phone] to schedule a follow up for future refills. Thank you  Dose: 12.5 mg     cholestyramine 4 GM/DOSE powder  Commonly known as: QUESTRAN   Take 4 g by mouth 2 times a day. Mixed with 4-6 oz water.  Pt takes at 1000 and 1700.   Hold all other PO medications an hour before and 4 hours after dosing.  Dose: 4 g     enalapril 20 MG tablet  Commonly known as: VASOTEC   Take 1 Tablet by mouth 2 times a day. Please call [phone] to schedule a follow up for future refills. Thank you  Dose: 20 mg     glimepiride 4 MG Tabs  Commonly known as: AMARYL   TAKE 1 TABLET DAILY     insulin glargine 100 UNIT/ML Soln  Commonly known as: Lantus   Inject 10-20 Units under the skin every evening. 20 UNITS IF BS > 200  10 UNITS IF BS < 200  Dose: 10-20 Units     omeprazole 20 MG delayed-release capsule  Commonly known as: PRILOSEC   Take 1 Capsule by mouth every morning.  Dose: 20 mg     Pen Needles 31G X 5 MM Misc   Inject 1 Application under the skin every day.  Dose: 1 Application     Trulicity 1.5 MG/0.5ML Sopn  Generic drug: Dulaglutide   Inject 1 mL as directed every 7 days. Saturday  Dose: 1 mL            STOP taking these medications      amLODIPine 5 MG Tabs  Commonly known as: NORVASC              Allergies  No Known Allergies    DIET  Orders Placed This Encounter   Procedures    Diet Order Diet: 2 Gram Sodium; Second Modifier: (optional): Consistent CHO (Diabetic); Fluid modifications: (optional): 1000 ml Fluid Restriction     Standing Status:   Standing     Number of Occurrences:   1     Order Specific Question:   Diet:     Answer:   2 Gram Sodium [7]     Order Specific Question:   Second Modifier: (optional)     Answer:   Consistent CHO (Diabetic) [4]     Order Specific Question:   Fluid modifications: (optional)     Answer:   1000 ml Fluid Restriction [7]       ACTIVITY  As tolerated.  Weight bearing as tolerated    CONSULTATIONS  Nephrology    PROCEDURES  None    LABORATORY  Lab  Results   Component Value Date    SODIUM 140 11/23/2022    POTASSIUM 4.1 11/23/2022    CHLORIDE 109 11/23/2022    CO2 23 11/23/2022    GLUCOSE 156 (H) 11/23/2022    BUN 45 (H) 11/23/2022    CREATININE 1.97 (H) 11/23/2022        Lab Results   Component Value Date    WBC 6.0 11/20/2022    HEMOGLOBIN 12.1 (L) 11/20/2022    HEMATOCRIT 35.5 (L) 11/20/2022    PLATELETCT 187 11/20/2022        Total time of the discharge process exceeds 38 minutes.

## 2022-11-23 NOTE — DISCHARGE PLANNING
Case Management Discharge Planning    Admission Date: 11/17/2022  GMLOS: 3.2  ALOS: 6    6-Clicks ADL Score: 24  6-Clicks Mobility Score: 18      Anticipated Discharge Dispo: Discharge Disposition: Discharged to home/self care (01)    DME Needed: No    Action(s) Taken: Updated Provider/Nurse on Discharge Plan    Escalations Completed: None    Medically Clear: No    Next Steps: Per AM rounds, nephrology is assessment patient to medically clear them for discharge home today.  Patient is currently on RA.  IMM provided 11/22/22.  Plan for home today with no needs.  Care management will continue to follow for discharge planning needs.      Barriers to Discharge: Medical clearance

## 2022-11-23 NOTE — PROGRESS NOTES
Nephrology Daily Progress Note    Date of Service  11/23/2022    Chief Complaint  52 y.o. male with CKD IIIa, diabetic nephropathy,  admitted 11/17/2022 with worsening SOB, consulted for worsening creat level    Interval Problem Update  11/20 -feels better, still mild SOB with wheezes  Edema slightly better  Good UOP  Creat level improving  11/21 patient still complaining of significant anasarca  No chest pain, no shortness of breath  11/22 patient feels better, still with significant anasarca  No chest pain, no shortness of breath.  11/23 patient has no new complaints today  He is anxious to go home  Review of Systems  Review of Systems   Constitutional:  Negative for chills, fever and malaise/fatigue.   Respiratory:  Negative for cough and shortness of breath.    Cardiovascular:  Positive for leg swelling. Negative for chest pain.   Gastrointestinal:  Negative for nausea and vomiting.   Genitourinary:  Negative for dysuria, frequency and urgency.      Physical Exam  Temp:  [36.4 °C (97.5 °F)-37.2 °C (99 °F)] 36.4 °C (97.5 °F)  Pulse:  [74-81] 74  Resp:  [15-17] 16  BP: (136-155)/(72-86) 154/84  SpO2:  [94 %-97 %] 97 %    Physical Exam  Vitals and nursing note reviewed.   Constitutional:       General: He is not in acute distress.     Appearance: He is not ill-appearing.   HENT:      Head: Normocephalic and atraumatic.      Right Ear: External ear normal.      Left Ear: External ear normal.      Nose: Nose normal.   Eyes:      General:         Right eye: No discharge.         Left eye: No discharge.      Conjunctiva/sclera: Conjunctivae normal.   Cardiovascular:      Rate and Rhythm: Normal rate and regular rhythm.      Heart sounds: No murmur heard.  Pulmonary:      Effort: Pulmonary effort is normal. No respiratory distress.      Breath sounds: Normal breath sounds. No wheezing.   Musculoskeletal:         General: No tenderness or deformity.      Right lower leg: Edema present.      Left lower leg: Edema present.    Skin:     General: Skin is warm.   Neurological:      General: No focal deficit present.      Mental Status: He is alert and oriented to person, place, and time.   Psychiatric:         Mood and Affect: Mood normal.         Behavior: Behavior normal.       Fluids    Intake/Output Summary (Last 24 hours) at 11/23/2022 1231  Last data filed at 11/23/2022 0700  Gross per 24 hour   Intake 740 ml   Output 2100 ml   Net -1360 ml         Laboratory        Recent Labs     11/21/22  0326 11/22/22  0303 11/23/22  0056   SODIUM 139 138 140   POTASSIUM 4.1 3.9 4.1   CHLORIDE 110 109 109   CO2 23 22 23   GLUCOSE 183* 130* 156*   BUN 37* 41* 45*   CREATININE 2.00* 1.84* 1.97*   CALCIUM 7.8* 7.7* 7.9*           No results for input(s): NTPROBNP in the last 72 hours.          Imaging  EC-ECHOCARDIOGRAM COMPLETE W/O CONT   Final Result      CT-HEAD W/O   Final Result      Head CT without contrast within normal limits. No evidence of acute cerebral infarction, hemorrhage or mass lesion.         DX-CHEST-PORTABLE (1 VIEW)   Final Result      1.  Small right-sided pleural effusion and right lateral pleural thickening.      2.  Faint opacification throughout the right lower hemithorax consistent with diffuse pleural thickening, atelectasis, and/or pneumonitis.           Assessment/Plan  1.AXEL/CKD IIIa : No uremic symptoms .  2.HTN.  3.Electrolytes: Okay.  4.Anemia.  5.Diabetic nephropathy with nephrotic range proteinuria -to restart ACEi  6.Volume overload: Improving.  7.  Proteinuria  8.  Hypoalbuminemia  Recs:  no acute need for HD  Continue diuretics twice daily patient was advised about the potential, risks which include hypotension symptoms.  He was instructed to call us if any of these symptoms occurs  Renal diet  Renal dose all meds  Avoid nephrotoxins  Follow-up in the renal clinic in 2 weeks  Okay to discharge from [facility]

## 2022-11-23 NOTE — PROGRESS NOTES
Discharge order received. No PIV  Printed discharge instructions and prescriptions given to pt. All discharge education complete, specifically need to f/u with PCP and come back through the ED, all questions and concerns were addressed. All CHF education complete, appt made with card. Pt awaiting meds to beds.

## 2022-11-27 ENCOUNTER — APPOINTMENT (OUTPATIENT)
Dept: RADIOLOGY | Facility: MEDICAL CENTER | Age: 52
DRG: 637 | End: 2022-11-27
Attending: INTERNAL MEDICINE
Payer: MEDICARE

## 2022-11-27 ENCOUNTER — HOSPITAL ENCOUNTER (INPATIENT)
Facility: MEDICAL CENTER | Age: 52
LOS: 6 days | DRG: 637 | End: 2022-12-04
Attending: EMERGENCY MEDICINE | Admitting: INTERNAL MEDICINE
Payer: MEDICARE

## 2022-11-27 ENCOUNTER — APPOINTMENT (OUTPATIENT)
Dept: RADIOLOGY | Facility: MEDICAL CENTER | Age: 52
DRG: 637 | End: 2022-11-27
Attending: EMERGENCY MEDICINE
Payer: MEDICARE

## 2022-11-27 DIAGNOSIS — E87.20 METABOLIC ACIDOSIS: ICD-10-CM

## 2022-11-27 DIAGNOSIS — R60.1 ANASARCA: ICD-10-CM

## 2022-11-27 DIAGNOSIS — Z79.4 TYPE 2 DIABETES MELLITUS WITH HYPERGLYCEMIA, WITH LONG-TERM CURRENT USE OF INSULIN (HCC): Chronic | ICD-10-CM

## 2022-11-27 DIAGNOSIS — N28.9 RENAL INSUFFICIENCY: ICD-10-CM

## 2022-11-27 DIAGNOSIS — E11.65 TYPE 2 DIABETES MELLITUS WITH HYPERGLYCEMIA, WITH LONG-TERM CURRENT USE OF INSULIN (HCC): Chronic | ICD-10-CM

## 2022-11-27 DIAGNOSIS — R10.10 PAIN OF UPPER ABDOMEN: ICD-10-CM

## 2022-11-27 PROBLEM — E87.70 FLUID OVERLOAD: Status: ACTIVE | Noted: 2022-11-27

## 2022-11-27 PROBLEM — J90 PLEURAL EFFUSION: Status: ACTIVE | Noted: 2022-11-27

## 2022-11-27 LAB
ALBUMIN SERPL BCP-MCNC: 1.8 G/DL (ref 3.2–4.9)
ALBUMIN/GLOB SERPL: 0.9 G/DL
ALP SERPL-CCNC: 57 U/L (ref 30–99)
ALT SERPL-CCNC: 24 U/L (ref 2–50)
ANION GAP SERPL CALC-SCNC: 4 MMOL/L (ref 7–16)
APPEARANCE UR: CLEAR
AST SERPL-CCNC: 24 U/L (ref 12–45)
BACTERIA #/AREA URNS HPF: NEGATIVE /HPF
BASOPHILS # BLD AUTO: 0.3 % (ref 0–1.8)
BASOPHILS # BLD: 0.03 K/UL (ref 0–0.12)
BILIRUB SERPL-MCNC: 1.6 MG/DL (ref 0.1–1.5)
BILIRUB UR QL STRIP.AUTO: NEGATIVE
BUN SERPL-MCNC: 32 MG/DL (ref 8–22)
CALCIUM SERPL-MCNC: 7.4 MG/DL (ref 8.5–10.5)
CHLORIDE SERPL-SCNC: 113 MMOL/L (ref 96–112)
CO2 SERPL-SCNC: 19 MMOL/L (ref 20–33)
COLOR UR: YELLOW
CREAT SERPL-MCNC: 1.52 MG/DL (ref 0.5–1.4)
EOSINOPHIL # BLD AUTO: 0.25 K/UL (ref 0–0.51)
EOSINOPHIL NFR BLD: 2.1 % (ref 0–6.9)
EPI CELLS #/AREA URNS HPF: ABNORMAL /HPF
ERYTHROCYTE [DISTWIDTH] IN BLOOD BY AUTOMATED COUNT: 43.2 FL (ref 35.9–50)
GFR SERPLBLD CREATININE-BSD FMLA CKD-EPI: 54 ML/MIN/1.73 M 2
GLOBULIN SER CALC-MCNC: 2.1 G/DL (ref 1.9–3.5)
GLUCOSE BLD STRIP.AUTO-MCNC: 78 MG/DL (ref 65–99)
GLUCOSE BLD STRIP.AUTO-MCNC: 80 MG/DL (ref 65–99)
GLUCOSE SERPL-MCNC: 138 MG/DL (ref 65–99)
GLUCOSE UR STRIP.AUTO-MCNC: 250 MG/DL
GRAN CASTS #/AREA URNS LPF: ABNORMAL /LPF
HCT VFR BLD AUTO: 31.3 % (ref 42–52)
HGB BLD-MCNC: 10.5 G/DL (ref 14–18)
HYALINE CASTS #/AREA URNS LPF: ABNORMAL /LPF
IMM GRANULOCYTES # BLD AUTO: 0.06 K/UL (ref 0–0.11)
IMM GRANULOCYTES NFR BLD AUTO: 0.5 % (ref 0–0.9)
KETONES UR STRIP.AUTO-MCNC: NEGATIVE MG/DL
LEUKOCYTE ESTERASE UR QL STRIP.AUTO: NEGATIVE
LIPASE SERPL-CCNC: 27 U/L (ref 11–82)
LYMPHOCYTES # BLD AUTO: 2.12 K/UL (ref 1–4.8)
LYMPHOCYTES NFR BLD: 17.9 % (ref 22–41)
MCH RBC QN AUTO: 30.3 PG (ref 27–33)
MCHC RBC AUTO-ENTMCNC: 33.5 G/DL (ref 33.7–35.3)
MCV RBC AUTO: 90.2 FL (ref 81.4–97.8)
MICRO URNS: ABNORMAL
MONOCYTES # BLD AUTO: 0.91 K/UL (ref 0–0.85)
MONOCYTES NFR BLD AUTO: 7.7 % (ref 0–13.4)
NEUTROPHILS # BLD AUTO: 8.45 K/UL (ref 1.82–7.42)
NEUTROPHILS NFR BLD: 71.5 % (ref 44–72)
NITRITE UR QL STRIP.AUTO: NEGATIVE
NRBC # BLD AUTO: 0 K/UL
NRBC BLD-RTO: 0 /100 WBC
PH UR STRIP.AUTO: 5 [PH] (ref 5–8)
PLATELET # BLD AUTO: 170 K/UL (ref 164–446)
PMV BLD AUTO: 11.2 FL (ref 9–12.9)
POTASSIUM SERPL-SCNC: 4.4 MMOL/L (ref 3.6–5.5)
PROT SERPL-MCNC: 3.9 G/DL (ref 6–8.2)
PROT UR QL STRIP: >=1000 MG/DL
RBC # BLD AUTO: 3.47 M/UL (ref 4.7–6.1)
RBC # URNS HPF: ABNORMAL /HPF
RBC UR QL AUTO: ABNORMAL
SODIUM SERPL-SCNC: 136 MMOL/L (ref 135–145)
SP GR UR STRIP.AUTO: 1.02
UROBILINOGEN UR STRIP.AUTO-MCNC: 0.2 MG/DL
WBC # BLD AUTO: 11.8 K/UL (ref 4.8–10.8)
WBC #/AREA URNS HPF: ABNORMAL /HPF

## 2022-11-27 PROCEDURE — 99285 EMERGENCY DEPT VISIT HI MDM: CPT

## 2022-11-27 PROCEDURE — 96375 TX/PRO/DX INJ NEW DRUG ADDON: CPT

## 2022-11-27 PROCEDURE — 85025 COMPLETE CBC W/AUTO DIFF WBC: CPT

## 2022-11-27 PROCEDURE — G0378 HOSPITAL OBSERVATION PER HR: HCPCS

## 2022-11-27 PROCEDURE — 99220 PR INITIAL OBSERVATION CARE,LEVL III: CPT | Performed by: INTERNAL MEDICINE

## 2022-11-27 PROCEDURE — 83690 ASSAY OF LIPASE: CPT

## 2022-11-27 PROCEDURE — 96374 THER/PROPH/DIAG INJ IV PUSH: CPT

## 2022-11-27 PROCEDURE — 700102 HCHG RX REV CODE 250 W/ 637 OVERRIDE(OP): Performed by: INTERNAL MEDICINE

## 2022-11-27 PROCEDURE — 71045 X-RAY EXAM CHEST 1 VIEW: CPT

## 2022-11-27 PROCEDURE — 82962 GLUCOSE BLOOD TEST: CPT

## 2022-11-27 PROCEDURE — 36415 COLL VENOUS BLD VENIPUNCTURE: CPT

## 2022-11-27 PROCEDURE — 700111 HCHG RX REV CODE 636 W/ 250 OVERRIDE (IP): Performed by: INTERNAL MEDICINE

## 2022-11-27 PROCEDURE — A9270 NON-COVERED ITEM OR SERVICE: HCPCS | Performed by: INTERNAL MEDICINE

## 2022-11-27 PROCEDURE — 700111 HCHG RX REV CODE 636 W/ 250 OVERRIDE (IP): Performed by: EMERGENCY MEDICINE

## 2022-11-27 PROCEDURE — 80053 COMPREHEN METABOLIC PANEL: CPT

## 2022-11-27 PROCEDURE — 81001 URINALYSIS AUTO W/SCOPE: CPT

## 2022-11-27 PROCEDURE — 74176 CT ABD & PELVIS W/O CONTRAST: CPT

## 2022-11-27 PROCEDURE — 96372 THER/PROPH/DIAG INJ SC/IM: CPT

## 2022-11-27 RX ORDER — POTASSIUM CHLORIDE 20 MEQ/1
20 TABLET, EXTENDED RELEASE ORAL 2 TIMES DAILY
Status: DISCONTINUED | OUTPATIENT
Start: 2022-11-27 | End: 2022-12-04 | Stop reason: HOSPADM

## 2022-11-27 RX ORDER — ACETAMINOPHEN 325 MG/1
650 TABLET ORAL EVERY 6 HOURS PRN
Status: DISCONTINUED | OUTPATIENT
Start: 2022-11-27 | End: 2022-12-04 | Stop reason: HOSPADM

## 2022-11-27 RX ORDER — HYDROMORPHONE HYDROCHLORIDE 1 MG/ML
1 INJECTION, SOLUTION INTRAMUSCULAR; INTRAVENOUS; SUBCUTANEOUS ONCE
Status: COMPLETED | OUTPATIENT
Start: 2022-11-27 | End: 2022-11-27

## 2022-11-27 RX ORDER — CHOLESTYRAMINE LIGHT 4 G/5.7G
4 POWDER, FOR SUSPENSION ORAL 2 TIMES DAILY
Status: DISCONTINUED | OUTPATIENT
Start: 2022-11-27 | End: 2022-12-04 | Stop reason: HOSPADM

## 2022-11-27 RX ORDER — PROCHLORPERAZINE EDISYLATE 5 MG/ML
5-10 INJECTION INTRAMUSCULAR; INTRAVENOUS EVERY 4 HOURS PRN
Status: DISCONTINUED | OUTPATIENT
Start: 2022-11-27 | End: 2022-12-04 | Stop reason: HOSPADM

## 2022-11-27 RX ORDER — PROMETHAZINE HYDROCHLORIDE 25 MG/1
12.5-25 TABLET ORAL EVERY 4 HOURS PRN
Status: DISCONTINUED | OUTPATIENT
Start: 2022-11-27 | End: 2022-12-04 | Stop reason: HOSPADM

## 2022-11-27 RX ORDER — OMEPRAZOLE 20 MG/1
20 CAPSULE, DELAYED RELEASE ORAL EVERY MORNING
Status: DISCONTINUED | OUTPATIENT
Start: 2022-11-28 | End: 2022-12-04 | Stop reason: HOSPADM

## 2022-11-27 RX ORDER — AMOXICILLIN 250 MG
2 CAPSULE ORAL 2 TIMES DAILY
Status: DISCONTINUED | OUTPATIENT
Start: 2022-11-28 | End: 2022-12-04 | Stop reason: HOSPADM

## 2022-11-27 RX ORDER — ENALAPRIL MALEATE 10 MG/1
20 TABLET ORAL 2 TIMES DAILY
Status: DISCONTINUED | OUTPATIENT
Start: 2022-11-27 | End: 2022-12-04 | Stop reason: HOSPADM

## 2022-11-27 RX ORDER — PROMETHAZINE HYDROCHLORIDE 25 MG/1
12.5-25 SUPPOSITORY RECTAL EVERY 4 HOURS PRN
Status: DISCONTINUED | OUTPATIENT
Start: 2022-11-27 | End: 2022-12-04 | Stop reason: HOSPADM

## 2022-11-27 RX ORDER — ONDANSETRON 2 MG/ML
4 INJECTION INTRAMUSCULAR; INTRAVENOUS EVERY 4 HOURS PRN
Status: DISCONTINUED | OUTPATIENT
Start: 2022-11-27 | End: 2022-12-04 | Stop reason: HOSPADM

## 2022-11-27 RX ORDER — CARVEDILOL 12.5 MG/1
12.5 TABLET ORAL 2 TIMES DAILY WITH MEALS
Status: DISCONTINUED | OUTPATIENT
Start: 2022-11-27 | End: 2022-12-04 | Stop reason: HOSPADM

## 2022-11-27 RX ORDER — SIMETHICONE 125 MG
250 CAPSULE ORAL
COMMUNITY
End: 2022-12-13

## 2022-11-27 RX ORDER — ONDANSETRON 4 MG/1
4 TABLET, ORALLY DISINTEGRATING ORAL EVERY 4 HOURS PRN
Status: DISCONTINUED | OUTPATIENT
Start: 2022-11-27 | End: 2022-12-04 | Stop reason: HOSPADM

## 2022-11-27 RX ORDER — ATORVASTATIN CALCIUM 80 MG/1
80 TABLET, FILM COATED ORAL EVERY EVENING
Status: DISCONTINUED | OUTPATIENT
Start: 2022-11-27 | End: 2022-12-04 | Stop reason: HOSPADM

## 2022-11-27 RX ORDER — OXYCODONE HYDROCHLORIDE 5 MG/1
5 TABLET ORAL EVERY 4 HOURS PRN
Status: DISCONTINUED | OUTPATIENT
Start: 2022-11-27 | End: 2022-12-04 | Stop reason: HOSPADM

## 2022-11-27 RX ORDER — BISACODYL 10 MG
10 SUPPOSITORY, RECTAL RECTAL
Status: DISCONTINUED | OUTPATIENT
Start: 2022-11-27 | End: 2022-12-04 | Stop reason: HOSPADM

## 2022-11-27 RX ORDER — AMLODIPINE BESYLATE 5 MG/1
10 TABLET ORAL DAILY
Status: ON HOLD | COMMUNITY
End: 2022-12-04

## 2022-11-27 RX ORDER — FUROSEMIDE 10 MG/ML
40 INJECTION INTRAMUSCULAR; INTRAVENOUS
Status: DISCONTINUED | OUTPATIENT
Start: 2022-11-27 | End: 2022-11-29

## 2022-11-27 RX ORDER — POLYETHYLENE GLYCOL 3350 17 G/17G
1 POWDER, FOR SOLUTION ORAL
Status: DISCONTINUED | OUTPATIENT
Start: 2022-11-27 | End: 2022-12-04 | Stop reason: HOSPADM

## 2022-11-27 RX ORDER — ATORVASTATIN CALCIUM 80 MG/1
80 TABLET, FILM COATED ORAL EVERY EVENING
COMMUNITY
End: 2023-06-02 | Stop reason: SDUPTHER

## 2022-11-27 RX ORDER — ENOXAPARIN SODIUM 100 MG/ML
40 INJECTION SUBCUTANEOUS DAILY
Status: DISCONTINUED | OUTPATIENT
Start: 2022-11-27 | End: 2022-12-04 | Stop reason: HOSPADM

## 2022-11-27 RX ORDER — MORPHINE SULFATE 4 MG/ML
2 INJECTION INTRAVENOUS EVERY 6 HOURS PRN
Status: DISCONTINUED | OUTPATIENT
Start: 2022-11-27 | End: 2022-11-28

## 2022-11-27 RX ADMIN — CARVEDILOL 12.5 MG: 12.5 TABLET, FILM COATED ORAL at 18:46

## 2022-11-27 RX ADMIN — ENOXAPARIN SODIUM 40 MG: 40 INJECTION SUBCUTANEOUS at 18:42

## 2022-11-27 RX ADMIN — FUROSEMIDE 40 MG: 10 INJECTION, SOLUTION INTRAMUSCULAR; INTRAVENOUS at 16:53

## 2022-11-27 RX ADMIN — OXYCODONE 5 MG: 5 TABLET ORAL at 21:11

## 2022-11-27 RX ADMIN — CHOLESTYRAMINE 4 G: 4 POWDER, FOR SUSPENSION ORAL at 22:33

## 2022-11-27 RX ADMIN — HYDROMORPHONE HYDROCHLORIDE 1 MG: 1 INJECTION, SOLUTION INTRAMUSCULAR; INTRAVENOUS; SUBCUTANEOUS at 13:53

## 2022-11-27 RX ADMIN — ENALAPRIL MALEATE 20 MG: 10 TABLET ORAL at 19:24

## 2022-11-27 RX ADMIN — ATORVASTATIN CALCIUM 80 MG: 80 TABLET, FILM COATED ORAL at 18:43

## 2022-11-27 RX ADMIN — POTASSIUM CHLORIDE 20 MEQ: 1500 TABLET, EXTENDED RELEASE ORAL at 18:42

## 2022-11-27 RX ADMIN — ACETAMINOPHEN 650 MG: 325 TABLET, FILM COATED ORAL at 19:23

## 2022-11-27 ASSESSMENT — LIFESTYLE VARIABLES
TOTAL SCORE: 0
ALCOHOL_USE: NO
CONSUMPTION TOTAL: NEGATIVE
DOES PATIENT WANT TO STOP DRINKING: NO
TOTAL SCORE: 0
TOTAL SCORE: 0
ON A TYPICAL DAY WHEN YOU DRINK ALCOHOL HOW MANY DRINKS DO YOU HAVE: 0
SUBSTANCE_ABUSE: 0
HAVE PEOPLE ANNOYED YOU BY CRITICIZING YOUR DRINKING: NO
AVERAGE NUMBER OF DAYS PER WEEK YOU HAVE A DRINK CONTAINING ALCOHOL: 0
EVER HAD A DRINK FIRST THING IN THE MORNING TO STEADY YOUR NERVES TO GET RID OF A HANGOVER: NO
HAVE YOU EVER FELT YOU SHOULD CUT DOWN ON YOUR DRINKING: NO
EVER FELT BAD OR GUILTY ABOUT YOUR DRINKING: NO
HOW MANY TIMES IN THE PAST YEAR HAVE YOU HAD 5 OR MORE DRINKS IN A DAY: 0

## 2022-11-27 ASSESSMENT — ENCOUNTER SYMPTOMS
HEADACHES: 0
HALLUCINATIONS: 0
FOCAL WEAKNESS: 0
CONSTIPATION: 0
DOUBLE VISION: 0
PHOTOPHOBIA: 0
BACK PAIN: 0
SPUTUM PRODUCTION: 0
FEVER: 0
TINGLING: 0
PALPITATIONS: 0
CHILLS: 0
TREMORS: 0
SENSORY CHANGE: 0
COUGH: 0
VOMITING: 0
SHORTNESS OF BREATH: 0
DIZZINESS: 0
DIARRHEA: 0
EYE PAIN: 0
ORTHOPNEA: 0
NECK PAIN: 0
BLURRED VISION: 0
ABDOMINAL PAIN: 0
WEIGHT LOSS: 0
SPEECH CHANGE: 0
FLANK PAIN: 1
MYALGIAS: 0
NAUSEA: 1

## 2022-11-27 ASSESSMENT — FIBROSIS 4 INDEX
FIB4 SCORE: 1.56
FIB4 SCORE: 2.24

## 2022-11-27 NOTE — ED NOTES
Med rec completed per patient at bedside.  Allergies reviewed with patient. NKDA.  No outpatient antibiotics in the last 30 days.  Patient's uses [facility] for long-term medications and [facility] in [place] for short-term medications.

## 2022-11-27 NOTE — ASSESSMENT & PLAN NOTE
He found to have chronic kidney disease most likely secondary to diabetes per; proven by biopsy   -- Nephrology following, will hold ACEI in the light of AXEL on CKD stage III

## 2022-11-27 NOTE — ASSESSMENT & PLAN NOTE
pleural effusion on CT renal colic.  chest x-ray it did not show significant pleural effusion and he is maintaining ox saturation on room air.  Hold thoracentesis for now.  Continue with diuresis

## 2022-11-27 NOTE — H&P
Hospital Medicine History & Physical Note    Date of Service  11/27/2022    Primary Care Physician  Florinda Pascual M.D.    Consultants  None    Specialist Names: N/A    Code Status  Full Code    I discussed CODE STATUS with him and he would like to be full code.    Chief Complaint  Chief Complaint   Patient presents with    Abdominal Pain    Flank Pain     Pt BIB EMS, report that pt has bilat flank/Abd pain. Pt reports recurrent complaint, hx of Kidney stones.          History of Presenting Illness    Beni Moore is a 52 y.o. male with past medical history of diabetes, hyperlipidemia and hypertension who presented to the hospital on 11/27/2022 with complaint of flank pain.  Patient reported he developed flank pain that started yesterday evening.  He describes his pain located in the center of his abdomen and radiating towards both of his kidneys.  There is no specific aggravating or elevating factors.  He reported associated symptoms of nausea.  He expressed that he was recently discharged from the hospital and he has been taking his medication as prescribed.  He expressed that when he came in his flank and was 7/10 in intensity and now not receiving pain medication is approximately 1-2/10 in intensity.  He denies any other acute complaints or associated symptoms.    ER course: He underwent CT renal it did not show any acute abnormalities.  He found to have possible pleural effusion and I ordered chest x-ray.  His lipase and UA did not show any acute normalities.  His chest pain has significantly improved with administration of IV Dilaudid.  He does not show signs of aortic dissection or acute abdomen on physical exam.    I personally discussed about this admission with ER physician Dr. Garrison.    I discussed the plan of care with patient.    Review of Systems  Review of Systems   Constitutional:  Negative for chills, fever and weight loss.   HENT:  Negative for hearing loss and tinnitus.    Eyes:  Negative  for blurred vision, double vision, photophobia and pain.   Respiratory:  Negative for cough, sputum production and shortness of breath.    Cardiovascular:  Positive for leg swelling. Negative for chest pain, palpitations and orthopnea.   Gastrointestinal:  Positive for nausea. Negative for abdominal pain, constipation, diarrhea and vomiting.   Genitourinary:  Positive for flank pain. Negative for dysuria, frequency and urgency.   Musculoskeletal:  Negative for back pain, joint pain, myalgias and neck pain.   Skin:  Negative for rash.   Neurological:  Negative for dizziness, tingling, tremors, sensory change, speech change, focal weakness and headaches.   Psychiatric/Behavioral:  Negative for hallucinations and substance abuse.    All other systems reviewed and are negative.    Past Medical History   has a past medical history of Anesthesia, Bowel habit changes, Cataract, Diabetes (HCC), Heart burn, Hemorrhagic disorder (HCC), heart artery stent (2019), Hyperlipidemia, and Hypertension.    Surgical History   has a past surgical history that includes other orthopedic surgery; toe amputation (Right, 7/2/2018); achilles tendon repair (Right, 7/2/2018); irrigation & debridement ortho (Right, 7/2/2018); vasectomy; tonsillectomy; other abdominal surgery; stent placement (2019); toe amputation (Left, 9/30/2021); and other (Left).     Family History  family history includes Cancer in his paternal grandfather and paternal grandmother; Diabetes in his father and maternal grandmother; Heart Disease in his father and maternal grandfather; Lung Cancer in his paternal grandfather; Lung Disease in his paternal grandmother; No Known Problems in his mother.   Family history reviewed with patient. There is no family history that is pertinent to the chief complaint.     Social History   reports that he quit smoking about 20 years ago. His smoking use included cigarettes. He has a 5.00 pack-year smoking history. He has never used  smokeless tobacco. He reports that he does not currently use drugs after having used the following drugs: Marijuana and Oral. He reports that he does not drink alcohol.    Allergies  No Known Allergies    Medications  Prior to Admission Medications   Prescriptions Last Dose Informant Patient Reported? Taking?   Insulin Pen Needle (PEN NEEDLES) 31G X 5 MM Misc  Patient No No   Sig: Inject 1 Application under the skin every day.   TRULICITY 1.5 MG/0.5ML Solution Pen-injector  Patient Yes No   Sig: Inject 1 mL as directed every 7 days. Saturday   aspirin 81 MG EC tablet   No No   Sig: TAKE 1 TABLET BY MOUTH ONCE DAILY   Patient taking differently: Take 1 Tablet by mouth every day.   atorvastatin (LIPITOR) 80 MG tablet   No No   Sig: TAKE 1 TABLET EVERY EVENING   Patient taking differently: Take 1 Tablet by mouth every evening.   carvedilol (COREG) 12.5 MG Tab   No No   Sig: Take 1 Tablet by mouth 2 times a day with meals. Please call [phone] to schedule a follow up for future refills. Thank you   cholestyramine (QUESTRAN) 4 GM/DOSE powder  Patient Yes No   Sig: Take 4 g by mouth 2 times a day. Mixed with 4-6 oz water.  Pt takes at 1000 and 1700.   Hold all other PO medications an hour before and 4 hours after dosing.   enalapril (VASOTEC) 20 MG tablet   No No   Sig: Take 1 Tablet by mouth 2 times a day. Please call [phone] to schedule a follow up for future refills. Thank you   furosemide (LASIX) 20 MG Tab   No No   Sig: Take 3 Tablets by mouth 2 times a day.   glimepiride (AMARYL) 4 MG Tab   No No   Sig: TAKE 1 TABLET DAILY   Patient taking differently: Take 1 Tablet by mouth every day.   insulin glargine 100 UNIT/ML SC SOLN   Yes No   Sig: Inject 10-20 Units under the skin every evening. 20 UNITS IF BS > 200  10 UNITS IF BS < 200   omeprazole (PRILOSEC) 20 MG delayed-release capsule  Patient Yes No   Sig: Take 1 Capsule by mouth every morning.   potassium chloride SA (KDUR) 20 MEQ Tab CR   No No   Sig: Take  1 Tablet by mouth 2 times a day.      Facility-Administered Medications: None       Physical Exam  Temp:  [36.4 °C (97.5 °F)] 36.4 °C (97.5 °F)  Pulse:  [69] 69  Resp:  [16] 16  BP: (121)/(73) 121/73  SpO2:  [94 %] 94 %  Blood Pressure: 121/73   Temperature: 36.4 °C (97.5 °F)   Pulse: 69   Respiration: 16   Pulse Oximetry: 94 %       Physical Exam  Vitals reviewed.   Constitutional:       General: He is not in acute distress.  HENT:      Head: Normocephalic and atraumatic. No contusion.      Right Ear: External ear normal.      Left Ear: External ear normal.      Nose: Nose normal.      Mouth/Throat:      Pharynx: No oropharyngeal exudate.   Eyes:      General:         Right eye: No discharge.         Left eye: No discharge.      Pupils: Pupils are equal, round, and reactive to light.   Cardiovascular:      Rate and Rhythm: Normal rate and regular rhythm.      Heart sounds: No murmur heard.    No friction rub. No gallop.   Pulmonary:      Effort: Pulmonary effort is normal.      Breath sounds: No wheezing or rhonchi.      Comments: Decreased breath sounds right lower lung  Abdominal:      General: Bowel sounds are normal. There is no distension.      Palpations: Abdomen is soft.      Tenderness: There is no abdominal tenderness. There is no rebound.      Comments: No signs of acute abdomen on physical exam.  No tenderness in the right upper quadrant    Musculoskeletal:         General: No swelling or tenderness. Normal range of motion.      Cervical back: No rigidity. No muscular tenderness.      Right lower leg: Edema present.      Left lower leg: Edema present.      Comments: Prior amputation on right foot   Skin:     General: Skin is warm and dry.      Coloration: Skin is not jaundiced.   Neurological:      General: No focal deficit present.      Mental Status: He is alert.      Cranial Nerves: No cranial nerve deficit.      Sensory: No sensory deficit.   Psychiatric:         Mood and Affect: Mood normal.        Laboratory:  Recent Labs     11/27/22  1106   WBC 11.8*   RBC 3.47*   HEMOGLOBIN 10.5*   HEMATOCRIT 31.3*   MCV 90.2   MCH 30.3   MCHC 33.5*   RDW 43.2   PLATELETCT 170   MPV 11.2     Recent Labs     11/27/22  1106   SODIUM 136   POTASSIUM 4.4   CHLORIDE 113*   CO2 19*   GLUCOSE 138*   BUN 32*   CREATININE 1.52*   CALCIUM 7.4*     Recent Labs     11/27/22  1106   ALTSGPT 24   ASTSGOT 24   ALKPHOSPHAT 57   TBILIRUBIN 1.6*   LIPASE 27   GLUCOSE 138*         No results for input(s): NTPROBNP in the last 72 hours.      No results for input(s): TROPONINT in the last 72 hours.    Imaging:  CT-RENAL COLIC EVALUATION(A/P W/O)   Final Result      1.  No renal or ureteral calculi. No hydronephrosis      2.   Small-to-moderate amount of ascites around the liver and spleen and extending into the pelvis      3.  Partially visualized moderate right pleural effusion with adjacent atelectasis.      4.  Diffuse subcutaneous edema is consistent with anasarca      5.  Cholelithiasis.      6.  Mild diverticulosis.      7.  Calcification pancreatic head is grossly unchanged from the prior CT.      DX-CHEST-PORTABLE (1 VIEW)    (Results Pending)            Assessment/Plan:  Justification for Admission Status  I anticipate this patient is appropriate for observation status at this time because Symptoms of abdominal pain and fluid overload requiring IV diuretics.  He has been taking p.o. diuretics at home and it has not been helping in his symptoms.    Patient will need a  bed on medical service .  The need is secondary to fluid overload.    Pleural effusion  Assessment & Plan  He found to have pleural effusion on CT renal colic.  I order chest x-ray it did not show significant pleural effusion and he is maintaining ox saturation on room air.  Hold thoracentesis for now.    Anasarca  Assessment & Plan  He found to have significant fluid overload despite he has been receiving p.o. Lasix.  I started him on IV Lasix 40 mg twice  daily.  Continue to monitor input and output.  He found to have small to moderate ascites.  On clinical exam it did not show significant distention.  If his symptoms do not improve he may need evaluation for paracentesis.    Serum albumin decreased- (present on admission)  Assessment & Plan  Secondary renal disease      Presence of stent in coronary artery- (present on admission)  Assessment & Plan  Continue outpatient medications   He does not have symptoms of chest pain.    Type 2 diabetes mellitus with hyperglycemia, with long-term current use of insulin (HCC)- (present on admission)  Assessment & Plan  I continues outpatient glargine 10 units  Started him on some sliding scale with hypoglycemia protocol.  Holding outpatient p.o. diabetic medications.    Stage 3a chronic kidney disease (HCC)- (present on admission)  Assessment & Plan  He found to have chronic kidney disease most likely secondary to diabetes per  Reviewed his biopsy results.  His renal function has improved since his prior evaluation.  I continues ACE inhibitor I am not holding it in the setting of diabetic nephropathy.  Continue monitor renal functions per  Avoid nephrotoxins  If his renal function worsen he may need nephrology consult.  Nephrology was consulted on his last admission.    I reviewed his echocardiogram which was performed on November 18, 2022 that showed ejection fraction of 60%.    I reviewed his discharge summary November 23, 2022.  I also reviewed nephrology note from November 27, 2022.    I discussed plan of care with patient and answered all his questions.    VTE prophylaxis: enoxaparin ppx    If renal function worsen may have to change DVT prophylaxis from Lovenox to heparin

## 2022-11-27 NOTE — ASSESSMENT & PLAN NOTE
Continue outpatient medication therapy  Patient follows with Dr. Andrew Engel, cardiologist  He does not have symptoms of chest pain.

## 2022-11-27 NOTE — ED TRIAGE NOTES
Chief Complaint   Patient presents with    Abdominal Pain    Flank Pain     Pt BIB EMS, report that pt has bilat flank/Abd pain. Pt reports recurrent complaint, hx of Kidney stones.

## 2022-11-27 NOTE — ASSESSMENT & PLAN NOTE
Patient does take Lantus along with glimepiride as an outpatient.     -- Stop glimepiride in the light of AXEL on CKD considering decreased clearance for glimepiride    -- Continuse ISS and hypoglycemia protocol, Accu-Cheks ACH S.    Likely patient would not need Lantus and glimepiride upon discharge

## 2022-11-27 NOTE — ASSESSMENT & PLAN NOTE
Remains with fluid overload upon admit despite he has been receiving p.o. Lasix 60mg.   --Low serum albumin, nephrology following, continue IV Lasix, I/os, daily weight, fluid restriction

## 2022-11-27 NOTE — ED PROVIDER NOTES
"ED Provider Note    CHIEF COMPLAINT  Chief Complaint   Patient presents with    Abdominal Pain    Flank Pain     Pt BIB EMS, report that pt has bilat flank/Abd pain. Pt reports recurrent complaint, hx of Kidney stones.          HPI  Beni Moore is a 52 y.o. male here for evaluation of bilateral flank pain.  Patient states that he has history of this in the past, and is currently being worked up for \"kidney problems.\"  He has no vomiting, and no fever or chills.  Patient states that the pain started last evening, and has been persistent into today.  He has no chest pain or shortness of breath.  He was given 200 of fentanyl, prior to arrival by EMS, and states he is feeling much better at this time.      ROS  See HPI for further details, o/w negative.     PAST MEDICAL HISTORY   has a past medical history of Anesthesia, Bowel habit changes, Cataract, Diabetes (HCC), Heart burn, Hemorrhagic disorder (HCC), heart artery stent (2019), Hyperlipidemia, and Hypertension.    SOCIAL HISTORY  Social History     Tobacco Use    Smoking status: Former     Packs/day: 0.50     Years: 10.00     Pack years: 5.00     Types: Cigarettes     Quit date:      Years since quittin.9    Smokeless tobacco: Never   Vaping Use    Vaping Use: Never used   Substance and Sexual Activity    Alcohol use: No    Drug use: Not Currently     Types: Marijuana, Oral     Comment: CBD Edibles 3-4 times per week    Sexual activity: Not Currently     Partners: Female       Family History  No bleeding disorders    SURGICAL HISTORY   has a past surgical history that includes other orthopedic surgery; toe amputation (Right, 2018); achilles tendon repair (Right, 2018); irrigation & debridement ortho (Right, 2018); vasectomy; tonsillectomy; other abdominal surgery; stent placement (2019); toe amputation (Left, 2021); and other (Left).    CURRENT MEDICATIONS  Home Medications    **Home medications have not yet been reviewed for this " encounter**         ALLERGIES  No Known Allergies    REVIEW OF SYSTEMS  See HPI for further details. Review of systems as above, otherwise all other systems are negative.     PHYSICAL EXAM  Constitutional: Well developed, well nourished.  Mild acute distress.  HEENT: Normocephalic, atraumatic. Posterior pharynx clear and moist.  Eyes:  EOMI. Normal sclera.  Neck: Supple, Full range of motion, nontender.  Chest/Pulmonary: clear to ausculation. Symmetrical expansion.   Cardio: Regular rate and rhythm with no murmur.   Abdomen: Soft, nontender. No peritoneal signs. No guarding. No palpable masses.  Back: Bilateral CVA tenderness, nontender midline, no step offs.  Musculoskeletal: No deformity, two plus edema, neurovascular intact.   Neuro: Clear speech, appropriate, cooperative, cranial nerves II-XII grossly intact.  Psych: Normal mood and affect    PROCEDURES     MEDICAL RECORD  I have reviewed patient's medical record and pertinent results are listed.    COURSE & MEDICAL DECISION MAKING  I have reviewed any medical record information, laboratory studies and radiographic results as noted above.      Results for orders placed or performed during the hospital encounter of 11/27/22   CBC WITH DIFFERENTIAL   Result Value Ref Range    WBC 11.8 (H) 4.8 - 10.8 K/uL    RBC 3.47 (L) 4.70 - 6.10 M/uL    Hemoglobin 10.5 (L) 14.0 - 18.0 g/dL    Hematocrit 31.3 (L) 42.0 - 52.0 %    MCV 90.2 81.4 - 97.8 fL    MCH 30.3 27.0 - 33.0 pg    MCHC 33.5 (L) 33.7 - 35.3 g/dL    RDW 43.2 35.9 - 50.0 fL    Platelet Count 170 164 - 446 K/uL    MPV 11.2 9.0 - 12.9 fL    Neutrophils-Polys 71.50 44.00 - 72.00 %    Lymphocytes 17.90 (L) 22.00 - 41.00 %    Monocytes 7.70 0.00 - 13.40 %    Eosinophils 2.10 0.00 - 6.90 %    Basophils 0.30 0.00 - 1.80 %    Immature Granulocytes 0.50 0.00 - 0.90 %    Nucleated RBC 0.00 /100 WBC    Neutrophils (Absolute) 8.45 (H) 1.82 - 7.42 K/uL    Lymphs (Absolute) 2.12 1.00 - 4.80 K/uL    Monos (Absolute) 0.91 (H)  0.00 - 0.85 K/uL    Eos (Absolute) 0.25 0.00 - 0.51 K/uL    Baso (Absolute) 0.03 0.00 - 0.12 K/uL    Immature Granulocytes (abs) 0.06 0.00 - 0.11 K/uL    NRBC (Absolute) 0.00 K/uL   COMP METABOLIC PANEL   Result Value Ref Range    Sodium 136 135 - 145 mmol/L    Potassium 4.4 3.6 - 5.5 mmol/L    Chloride 113 (H) 96 - 112 mmol/L    Co2 19 (L) 20 - 33 mmol/L    Anion Gap 4.0 (L) 7.0 - 16.0    Glucose 138 (H) 65 - 99 mg/dL    Bun 32 (H) 8 - 22 mg/dL    Creatinine 1.52 (H) 0.50 - 1.40 mg/dL    Calcium 7.4 (L) 8.5 - 10.5 mg/dL    AST(SGOT) 24 12 - 45 U/L    ALT(SGPT) 24 2 - 50 U/L    Alkaline Phosphatase 57 30 - 99 U/L    Total Bilirubin 1.6 (H) 0.1 - 1.5 mg/dL    Albumin 1.8 (L) 3.2 - 4.9 g/dL    Total Protein 3.9 (L) 6.0 - 8.2 g/dL    Globulin 2.1 1.9 - 3.5 g/dL    A-G Ratio 0.9 g/dL   LIPASE   Result Value Ref Range    Lipase 27 11 - 82 U/L   URINALYSIS    Specimen: Urine   Result Value Ref Range    Color Yellow     Character Clear     Specific Gravity 1.017 <1.035    Ph 5.0 5.0 - 8.0    Glucose 250 (A) Negative mg/dL    Ketones Negative Negative mg/dL    Protein >=1000 (A) Negative mg/dL    Bilirubin Negative Negative    Urobilinogen, Urine 0.2 Negative    Nitrite Negative Negative    Leukocyte Esterase Negative Negative    Occult Blood Moderate (A) Negative    Micro Urine Req Microscopic    ESTIMATED GFR   Result Value Ref Range    GFR (CKD-EPI) 54 (A) >60 mL/min/1.73 m 2   URINE MICROSCOPIC (W/UA)   Result Value Ref Range    WBC 2-5 (A) /hpf    RBC 2-5 (A) /hpf    Bacteria Negative None /hpf    Epithelial Cells Few /hpf    Hyaline Cast 11-20 (A) /lpf    Granular Casts 6-10 (A) /lpf     CT-RENAL COLIC EVALUATION(A/P W/O)   Final Result      1.  No renal or ureteral calculi. No hydronephrosis      2.   Small-to-moderate amount of ascites around the liver and spleen and extending into the pelvis      3.  Partially visualized moderate right pleural effusion with adjacent atelectasis.      4.  Diffuse subcutaneous edema  is consistent with anasarca      5.  Cholelithiasis.      6.  Mild diverticulosis.      7.  Calcification pancreatic head is grossly unchanged from the prior CT.      DX-CHEST-PORTABLE (1 VIEW)    (Results Pending)       The pt will be admitted to the hospitalist service.     FINAL IMPRESSION  1. Pain of upper abdomen        2. Anasarca                Electronically signed by: Keyur Garrison D.O., 11/27/2022 12:41 PM

## 2022-11-28 ENCOUNTER — APPOINTMENT (OUTPATIENT)
Dept: RADIOLOGY | Facility: MEDICAL CENTER | Age: 52
DRG: 637 | End: 2022-11-28
Attending: NURSE PRACTITIONER
Payer: MEDICARE

## 2022-11-28 PROBLEM — E11.649 HYPOGLYCEMIA ASSOCIATED WITH DIABETES (HCC): Status: ACTIVE | Noted: 2022-11-28

## 2022-11-28 LAB
ALBUMIN SERPL BCP-MCNC: 1.6 G/DL (ref 3.2–4.9)
ALBUMIN/GLOB SERPL: 0.7 G/DL
ALP SERPL-CCNC: 50 U/L (ref 30–99)
ALT SERPL-CCNC: 17 U/L (ref 2–50)
ANION GAP SERPL CALC-SCNC: 7 MMOL/L (ref 7–16)
ANION GAP SERPL CALC-SCNC: 7 MMOL/L (ref 7–16)
AST SERPL-CCNC: 20 U/L (ref 12–45)
BASOPHILS # BLD AUTO: 0.3 % (ref 0–1.8)
BASOPHILS # BLD: 0.05 K/UL (ref 0–0.12)
BILIRUB SERPL-MCNC: 1.5 MG/DL (ref 0.1–1.5)
BUN SERPL-MCNC: 34 MG/DL (ref 8–22)
BUN SERPL-MCNC: 35 MG/DL (ref 8–22)
CALCIUM SERPL-MCNC: 7.5 MG/DL (ref 8.5–10.5)
CALCIUM SERPL-MCNC: 7.5 MG/DL (ref 8.5–10.5)
CHLORIDE SERPL-SCNC: 109 MMOL/L (ref 96–112)
CHLORIDE SERPL-SCNC: 111 MMOL/L (ref 96–112)
CO2 SERPL-SCNC: 19 MMOL/L (ref 20–33)
CO2 SERPL-SCNC: 21 MMOL/L (ref 20–33)
CORTIS SERPL-MCNC: 17 UG/DL (ref 0–23)
CREAT SERPL-MCNC: 1.96 MG/DL (ref 0.5–1.4)
CREAT SERPL-MCNC: 2.14 MG/DL (ref 0.5–1.4)
EOSINOPHIL # BLD AUTO: 0.04 K/UL (ref 0–0.51)
EOSINOPHIL NFR BLD: 0.2 % (ref 0–6.9)
ERYTHROCYTE [DISTWIDTH] IN BLOOD BY AUTOMATED COUNT: 45.1 FL (ref 35.9–50)
GFR SERPLBLD CREATININE-BSD FMLA CKD-EPI: 36 ML/MIN/1.73 M 2
GFR SERPLBLD CREATININE-BSD FMLA CKD-EPI: 40 ML/MIN/1.73 M 2
GLOBULIN SER CALC-MCNC: 2.2 G/DL (ref 1.9–3.5)
GLUCOSE BLD STRIP.AUTO-MCNC: 107 MG/DL (ref 65–99)
GLUCOSE BLD STRIP.AUTO-MCNC: 138 MG/DL (ref 65–99)
GLUCOSE BLD STRIP.AUTO-MCNC: 32 MG/DL (ref 65–99)
GLUCOSE BLD STRIP.AUTO-MCNC: 34 MG/DL (ref 65–99)
GLUCOSE BLD STRIP.AUTO-MCNC: 38 MG/DL (ref 65–99)
GLUCOSE BLD STRIP.AUTO-MCNC: 58 MG/DL (ref 65–99)
GLUCOSE BLD STRIP.AUTO-MCNC: 62 MG/DL (ref 65–99)
GLUCOSE BLD STRIP.AUTO-MCNC: 64 MG/DL (ref 65–99)
GLUCOSE BLD STRIP.AUTO-MCNC: 65 MG/DL (ref 65–99)
GLUCOSE BLD STRIP.AUTO-MCNC: 71 MG/DL (ref 65–99)
GLUCOSE BLD STRIP.AUTO-MCNC: 72 MG/DL (ref 65–99)
GLUCOSE BLD STRIP.AUTO-MCNC: 82 MG/DL (ref 65–99)
GLUCOSE BLD STRIP.AUTO-MCNC: 84 MG/DL (ref 65–99)
GLUCOSE SERPL-MCNC: 54 MG/DL (ref 65–99)
GLUCOSE SERPL-MCNC: 58 MG/DL (ref 65–99)
HCT VFR BLD AUTO: 31.7 % (ref 42–52)
HGB BLD-MCNC: 10.6 G/DL (ref 14–18)
IMM GRANULOCYTES # BLD AUTO: 0.07 K/UL (ref 0–0.11)
IMM GRANULOCYTES NFR BLD AUTO: 0.4 % (ref 0–0.9)
LYMPHOCYTES # BLD AUTO: 2.2 K/UL (ref 1–4.8)
LYMPHOCYTES NFR BLD: 11.4 % (ref 22–41)
MAGNESIUM SERPL-MCNC: 1.7 MG/DL (ref 1.5–2.5)
MAGNESIUM SERPL-MCNC: 1.8 MG/DL (ref 1.5–2.5)
MCH RBC QN AUTO: 30.6 PG (ref 27–33)
MCHC RBC AUTO-ENTMCNC: 33.4 G/DL (ref 33.7–35.3)
MCV RBC AUTO: 91.6 FL (ref 81.4–97.8)
MONOCYTES # BLD AUTO: 2.08 K/UL (ref 0–0.85)
MONOCYTES NFR BLD AUTO: 10.8 % (ref 0–13.4)
NEUTROPHILS # BLD AUTO: 14.79 K/UL (ref 1.82–7.42)
NEUTROPHILS NFR BLD: 76.9 % (ref 44–72)
NRBC # BLD AUTO: 0 K/UL
NRBC BLD-RTO: 0 /100 WBC
PHOSPHATE SERPL-MCNC: 2.6 MG/DL (ref 2.5–4.5)
PLATELET # BLD AUTO: 152 K/UL (ref 164–446)
PMV BLD AUTO: 11.1 FL (ref 9–12.9)
POTASSIUM SERPL-SCNC: 4.3 MMOL/L (ref 3.6–5.5)
POTASSIUM SERPL-SCNC: 4.6 MMOL/L (ref 3.6–5.5)
PROT SERPL-MCNC: 3.8 G/DL (ref 6–8.2)
RBC # BLD AUTO: 3.46 M/UL (ref 4.7–6.1)
SODIUM SERPL-SCNC: 135 MMOL/L (ref 135–145)
SODIUM SERPL-SCNC: 139 MMOL/L (ref 135–145)
WBC # BLD AUTO: 19.2 K/UL (ref 4.8–10.8)

## 2022-11-28 PROCEDURE — 80053 COMPREHEN METABOLIC PANEL: CPT

## 2022-11-28 PROCEDURE — 700101 HCHG RX REV CODE 250: Performed by: NURSE PRACTITIONER

## 2022-11-28 PROCEDURE — 700111 HCHG RX REV CODE 636 W/ 250 OVERRIDE (IP): Mod: JG | Performed by: INTERNAL MEDICINE

## 2022-11-28 PROCEDURE — 84100 ASSAY OF PHOSPHORUS: CPT

## 2022-11-28 PROCEDURE — 700111 HCHG RX REV CODE 636 W/ 250 OVERRIDE (IP): Performed by: INTERNAL MEDICINE

## 2022-11-28 PROCEDURE — 700105 HCHG RX REV CODE 258: Performed by: INTERNAL MEDICINE

## 2022-11-28 PROCEDURE — A9270 NON-COVERED ITEM OR SERVICE: HCPCS | Performed by: INTERNAL MEDICINE

## 2022-11-28 PROCEDURE — 83735 ASSAY OF MAGNESIUM: CPT

## 2022-11-28 PROCEDURE — 82533 TOTAL CORTISOL: CPT

## 2022-11-28 PROCEDURE — 85025 COMPLETE CBC W/AUTO DIFF WBC: CPT

## 2022-11-28 PROCEDURE — 82962 GLUCOSE BLOOD TEST: CPT

## 2022-11-28 PROCEDURE — 770000 HCHG ROOM/CARE - INTERMEDIATE ICU *

## 2022-11-28 PROCEDURE — 700101 HCHG RX REV CODE 250: Performed by: INTERNAL MEDICINE

## 2022-11-28 PROCEDURE — A9270 NON-COVERED ITEM OR SERVICE: HCPCS | Performed by: NURSE PRACTITIONER

## 2022-11-28 PROCEDURE — 80048 BASIC METABOLIC PNL TOTAL CA: CPT

## 2022-11-28 PROCEDURE — 96375 TX/PRO/DX INJ NEW DRUG ADDON: CPT

## 2022-11-28 PROCEDURE — 700111 HCHG RX REV CODE 636 W/ 250 OVERRIDE (IP): Mod: JA | Performed by: INTERNAL MEDICINE

## 2022-11-28 PROCEDURE — 96376 TX/PRO/DX INJ SAME DRUG ADON: CPT

## 2022-11-28 PROCEDURE — 87040 BLOOD CULTURE FOR BACTERIA: CPT

## 2022-11-28 PROCEDURE — 700105 HCHG RX REV CODE 258: Performed by: NURSE PRACTITIONER

## 2022-11-28 PROCEDURE — 700102 HCHG RX REV CODE 250 W/ 637 OVERRIDE(OP): Performed by: NURSE PRACTITIONER

## 2022-11-28 PROCEDURE — 700102 HCHG RX REV CODE 250 W/ 637 OVERRIDE(OP): Performed by: INTERNAL MEDICINE

## 2022-11-28 PROCEDURE — 99233 SBSQ HOSP IP/OBS HIGH 50: CPT | Performed by: NURSE PRACTITIONER

## 2022-11-28 RX ORDER — GAUZE BANDAGE 2" X 2"
100 BANDAGE TOPICAL DAILY
Status: DISCONTINUED | OUTPATIENT
Start: 2022-11-28 | End: 2022-12-04 | Stop reason: HOSPADM

## 2022-11-28 RX ORDER — HYDROMORPHONE HYDROCHLORIDE 1 MG/ML
0.5 INJECTION, SOLUTION INTRAMUSCULAR; INTRAVENOUS; SUBCUTANEOUS
Status: DISCONTINUED | OUTPATIENT
Start: 2022-11-28 | End: 2022-12-04 | Stop reason: HOSPADM

## 2022-11-28 RX ORDER — DEXTROSE AND SODIUM CHLORIDE 5; .9 G/100ML; G/100ML
INJECTION, SOLUTION INTRAVENOUS CONTINUOUS
Status: DISCONTINUED | OUTPATIENT
Start: 2022-11-28 | End: 2022-11-28

## 2022-11-28 RX ADMIN — SODIUM CHLORIDE: 4 INJECTION, SOLUTION, CONCENTRATE INTRAVENOUS at 12:21

## 2022-11-28 RX ADMIN — CHOLESTYRAMINE 4 G: 4 POWDER, FOR SUSPENSION ORAL at 18:27

## 2022-11-28 RX ADMIN — OCTREOTIDE ACETATE 100 MCG/HR: 200 INJECTION, SOLUTION INTRAVENOUS; SUBCUTANEOUS at 17:20

## 2022-11-28 RX ADMIN — OMEPRAZOLE 20 MG: 20 CAPSULE, DELAYED RELEASE ORAL at 06:12

## 2022-11-28 RX ADMIN — ENALAPRIL MALEATE 20 MG: 10 TABLET ORAL at 17:13

## 2022-11-28 RX ADMIN — ENOXAPARIN SODIUM 40 MG: 40 INJECTION SUBCUTANEOUS at 17:13

## 2022-11-28 RX ADMIN — MORPHINE SULFATE 2 MG: 4 INJECTION INTRAVENOUS at 03:51

## 2022-11-28 RX ADMIN — FUROSEMIDE 40 MG: 10 INJECTION, SOLUTION INTRAMUSCULAR; INTRAVENOUS at 17:12

## 2022-11-28 RX ADMIN — POTASSIUM CHLORIDE 20 MEQ: 1500 TABLET, EXTENDED RELEASE ORAL at 17:12

## 2022-11-28 RX ADMIN — CARVEDILOL 12.5 MG: 12.5 TABLET, FILM COATED ORAL at 17:12

## 2022-11-28 RX ADMIN — ASPIRIN 81 MG: 81 TABLET, COATED ORAL at 06:13

## 2022-11-28 RX ADMIN — DEXTROSE MONOHYDRATE 25 G: 100 INJECTION, SOLUTION INTRAVENOUS at 11:52

## 2022-11-28 RX ADMIN — DEXTROSE AND SODIUM CHLORIDE: 5; 900 INJECTION, SOLUTION INTRAVENOUS at 09:32

## 2022-11-28 RX ADMIN — OXYCODONE 5 MG: 5 TABLET ORAL at 09:44

## 2022-11-28 RX ADMIN — DOCUSATE SODIUM 50 MG AND SENNOSIDES 8.6 MG 2 TABLET: 8.6; 5 TABLET, FILM COATED ORAL at 17:12

## 2022-11-28 RX ADMIN — POTASSIUM CHLORIDE 20 MEQ: 1500 TABLET, EXTENDED RELEASE ORAL at 06:12

## 2022-11-28 RX ADMIN — SODIUM CHLORIDE: 4 INJECTION, SOLUTION, CONCENTRATE INTRAVENOUS at 17:11

## 2022-11-28 RX ADMIN — MORPHINE SULFATE 2 MG: 4 INJECTION INTRAVENOUS at 16:47

## 2022-11-28 RX ADMIN — THIAMINE HCL TAB 100 MG 100 MG: 100 TAB at 13:48

## 2022-11-28 RX ADMIN — HYDROMORPHONE HYDROCHLORIDE 0.5 MG: 1 INJECTION, SOLUTION INTRAMUSCULAR; INTRAVENOUS; SUBCUTANEOUS at 22:12

## 2022-11-28 RX ADMIN — DOCUSATE SODIUM 50 MG AND SENNOSIDES 8.6 MG 2 TABLET: 8.6; 5 TABLET, FILM COATED ORAL at 06:13

## 2022-11-28 RX ADMIN — CHOLESTYRAMINE 4 G: 4 POWDER, FOR SUSPENSION ORAL at 11:32

## 2022-11-28 RX ADMIN — FUROSEMIDE 40 MG: 10 INJECTION, SOLUTION INTRAMUSCULAR; INTRAVENOUS at 06:13

## 2022-11-28 RX ADMIN — GLUCAGON HYDROCHLORIDE 1 MG: 1 INJECTION, POWDER, FOR SOLUTION INTRAMUSCULAR; INTRAVENOUS; SUBCUTANEOUS at 14:55

## 2022-11-28 RX ADMIN — ATORVASTATIN CALCIUM 80 MG: 80 TABLET, FILM COATED ORAL at 17:12

## 2022-11-28 ASSESSMENT — ENCOUNTER SYMPTOMS
FEVER: 0
MYALGIAS: 1
FLANK PAIN: 1
WEAKNESS: 1
CHILLS: 0
ABDOMINAL PAIN: 1
RESPIRATORY NEGATIVE: 1
EYES NEGATIVE: 1
PSYCHIATRIC NEGATIVE: 1
CARDIOVASCULAR NEGATIVE: 1

## 2022-11-28 ASSESSMENT — PAIN DESCRIPTION - PAIN TYPE
TYPE: ACUTE PAIN
TYPE: OTHER (COMMENT)

## 2022-11-28 NOTE — ASSESSMENT & PLAN NOTE
Does not need to be on any oral hypoglycemic medication surgery; clearance of oral hypoglycemic medication impaired in renal failure  Blood glucose improving

## 2022-11-28 NOTE — PROGRESS NOTES
Hospital Medicine Daily Progress Note    Date of Service  11/28/2022    Chief Complaint  Beni Moore is a 52 y.o. male admitted 11/27/2022 with abdominal pain, flank pain    Hospital Course  Mr. Beni Moore is a 52 y.o. male with a PMHx of diabetes, hyperlipidemia and hypertension who presented to the hospital on 11/27/2022 with complaints of abdominal pain and flank pain.      Patient reported he developed flank pain that started yesterday evening.  He describes his pain located in the center of his abdomen and radiating towards both of his kidneys.  There is no specific aggravating or elevating factors.  He reported associated symptoms of nausea.  He expressed that he was recently discharged from the hospital and he has been taking his medication as prescribed.  He expressed that when he came in his flank and was 7/10 in intensity and now not receiving pain medication is approximately 1-2/10 in intensity.  He denies any other acute complaints or associated symptoms.     In ER, vital signs were within normal limits.  He underwent CT renal it did not show any acute abnormalities.  He found to have possible pleural effusion and I ordered chest x-ray.  His lipase and UA did not show any acute normalities.  His chest pain has significantly improved with administration of IV Dilaudid.  He does not show signs of aortic dissection or acute abdomen on physical exam.    Patient monitored overnight.  Patient noted to have severe hypoglycemia at 31 of which hypoglycemia protocol was initiated.  All insulin was held.  Patient ate breakfast with a recheck of blood sugars after breakfast noted at 25.  Hypoglycemia protocol was then initiated with a bolus of dextrose 10% with 25 g.    Consulted intensivist, Dr. Hung regarding possible higher level of care due to persistent hypoglycemia.  Per intensivist, supplement thiamine, check mag and Phos, initiate D10 infusion at  cc/h, recheck blood glucose every hour.  If  sugars persist to below 50, we will pursue with higher level of care.    Interval Problem Update  -Patient seen and examined.  Patient still endorses some flank pain.  Discussed with patient hypoglycemia of which patient denies any symptoms indicating hypoglycemia.  Patient denies any insulin use or any 1 giving him insulin during this hospital stay.  Patient will place on D10 infusion along with every hour checks for sugars as recommended by intensivist, Dr. Hung.  Recheck electrolytes, replace thiamine also in place.  -Plan of care: Continue to monitor patient's blood sugar every hour or sooner if indicated; start D10 infusion; pursue higher level of care with intensivist if patient's blood sugars persists below 50 while on D10 infusion; check electrolytes  -Disposition: Patient will be switched to inpatient status as he is anticipated to stay 2-3 midnights for management of persistent hypoglycemia, exacerbation of CKD and possible nephrology consult.  -Lab work: Reviewed; expected  -VSS at this time    I have discussed this patient's plan of care and discharge plan at IDT rounds today with Case Management, Nursing, Nursing leadership, and other members of the IDT team.    Consultants/Specialty  critical care    Code Status  Full Code    Disposition  Patient is not medically cleared for discharge.   Anticipate discharge to to home with close outpatient follow-up.  I have placed the appropriate orders for post-discharge needs.    Review of Systems  Review of Systems   Constitutional:  Positive for malaise/fatigue. Negative for chills and fever.   HENT: Negative.     Eyes: Negative.    Respiratory: Negative.     Cardiovascular: Negative.    Gastrointestinal:  Positive for abdominal pain.   Genitourinary:  Positive for flank pain.   Musculoskeletal:  Positive for myalgias.   Skin: Negative.    Neurological:  Positive for weakness.   Endo/Heme/Allergies: Negative.    Psychiatric/Behavioral: Negative.        Physical  Exam  Temp:  [37.2 °C (99 °F)-38.2 °C (100.8 °F)] 37.2 °C (99 °F)  Pulse:  [76-87] 78  Resp:  [13-19] 16  BP: (119-163)/(60-86) 119/60  SpO2:  [91 %-96 %] 94 %    Physical Exam  Vitals and nursing note reviewed.   Constitutional:       Appearance: He is obese.   HENT:      Head: Normocephalic.      Nose: Nose normal.      Mouth/Throat:      Mouth: Mucous membranes are moist.      Pharynx: Oropharynx is clear.   Eyes:      Pupils: Pupils are equal, round, and reactive to light.   Cardiovascular:      Rate and Rhythm: Normal rate and regular rhythm.      Pulses: Normal pulses.      Heart sounds: Normal heart sounds.   Pulmonary:      Breath sounds: Normal breath sounds.   Abdominal:      General: Bowel sounds are normal.      Palpations: Abdomen is soft.      Tenderness: There is abdominal tenderness. There is guarding.   Musculoskeletal:         General: Tenderness present.      Cervical back: Normal range of motion and neck supple.   Skin:     General: Skin is dry.      Capillary Refill: Capillary refill takes 2 to 3 seconds.   Neurological:      Mental Status: He is alert. Mental status is at baseline.       Fluids    Intake/Output Summary (Last 24 hours) at 11/28/2022 1211  Last data filed at 11/28/2022 0400  Gross per 24 hour   Intake --   Output 400 ml   Net -400 ml       Laboratory  Recent Labs     11/27/22  1106 11/28/22  0334   WBC 11.8* 19.2*   RBC 3.47* 3.46*   HEMOGLOBIN 10.5* 10.6*   HEMATOCRIT 31.3* 31.7*   MCV 90.2 91.6   MCH 30.3 30.6   MCHC 33.5* 33.4*   RDW 43.2 45.1   PLATELETCT 170 152*   MPV 11.2 11.1     Recent Labs     11/27/22  1106 11/28/22  0334   SODIUM 136 139   POTASSIUM 4.4 4.6   CHLORIDE 113* 111   CO2 19* 21   GLUCOSE 138* 58*   BUN 32* 34*   CREATININE 1.52* 1.96*   CALCIUM 7.4* 7.5*                   Imaging  DX-CHEST-PORTABLE (1 VIEW)   Final Result      Hazy opacification in the right lung is similar to the prior exam and likely represents layering pleural fluid      CT-RENAL COLIC  EVALUATION(A/P W/O)   Final Result      1.  No renal or ureteral calculi. No hydronephrosis      2.   Small-to-moderate amount of ascites around the liver and spleen and extending into the pelvis      3.  Partially visualized moderate right pleural effusion with adjacent atelectasis.      4.  Diffuse subcutaneous edema is consistent with anasarca      5.  Cholelithiasis.      6.  Mild diverticulosis.      7.  Calcification pancreatic head is grossly unchanged from the prior CT.           Assessment/Plan  Pleural effusion  Assessment & Plan  - pleural effusion on CT renal colic.  -chest x-ray it did not show significant pleural effusion and he is maintaining ox saturation on room air.  -Hold thoracentesis for now.    Anasarca  Assessment & Plan  -significant fluid overload despite he has been receiving p.o. Lasix.  - IV Lasix 40 mg twice daily.  -Continue to monitor input and output.  - small to moderate ascites.  On clinical exam it did not show significant distention.  If his symptoms do not improve he may need evaluation for paracentesis.    Serum albumin decreased- (present on admission)  Assessment & Plan  Secondary renal disease      Presence of stent in coronary artery- (present on admission)  Assessment & Plan  -Continue outpatient medications   -He does not have symptoms of chest pain.    Hypoglycemia  Assessment & Plan  - Persistent hypoglycemia since admission  -Hypoglycemia protocol in place  -Has been given 2 doses of D10 with 25 g of bolus  -Consulted intensivist, Dr. Hung with recommendations for magnesium, phosphorus check, initiate thiamine, initiate D10 infusion  -Recheck blood sugars every hour  -If persistent hypoglycemia below 50 while on D10 infusion, patient will need higher level of care    Type 2 diabetes mellitus with hyperglycemia, with long-term current use of insulin (HCC)- (present on admission)  Assessment & Plan  - glargine 10 units  -SSI when indicated  -Holding outpatient p.o.  diabetic medications.  -HOLDING all insulin at this time due to hypoglycemia    Stage 3a chronic kidney disease (HCC)- (present on admission)  Assessment & Plan  -He found to have chronic kidney disease most likely secondary to diabetes per  -Reviewed his biopsy results.  -His renal function has improved since his prior evaluation.  -Continue ACE inhibitor I am not holding it in the setting of diabetic nephropathy.  -Continue monitor renal functions per  -Avoid nephrotoxins  -If his renal function worsen he may need nephrology consult.  Nephrology was consulted on his last admission.       VTE prophylaxis: enoxaparin ppx    I have performed a physical exam and reviewed and updated ROS and Plan today (11/28/2022). In review of yesterday's note (11/27/2022), there are no changes except as documented above.    ================================================================================================================================================================================================================  Please note that this dictation was created using voice recognition software. I have made every reasonable attempt to correct obvious errors, but there may be errors of grammar and possibly content that I did not discover before finalizing the note.    Electronically signed by:  Dr. RICK Hermosillo, DNP, APRN, FNP-C  Hospitalist Services  [facility]  [phone]  [email or web address]  11/28/22                 1620

## 2022-11-28 NOTE — CARE PLAN
The patient is Stable - Low risk of patient condition declining or worsening    Shift Goals  Clinical Goals: Pain management, monitor I&Os  Patient Goals: rest  Family Goals: not at bedside    Progress made toward(s) clinical / shift goals:    Problem: Knowledge Deficit - Standard  Goal: Patient and family/care givers will demonstrate understanding of plan of care, disease process/condition, diagnostic tests and medications  Outcome: Progressing       Patient is not progressing towards the following goals:

## 2022-11-28 NOTE — PROGRESS NOTES
Hypoglycemia Intervention    Hypoglycemia protocol intervention: Notified on call  Blood glucose 32 at 0640.  Intervention: 8 oz of fruit juice   Repeat blood glucose 0700 at 34.  Intervention: 8 oz of fruit juice , Notified on call  Repeat blood glucose 72, at 0715  Syd Lorenzo notified

## 2022-11-28 NOTE — PROGRESS NOTES
4 Eyes Skin Assessment Completed by Anahi RN and Cristiano RN.    Head WDL  Ears WDL  Nose WDL  Mouth WDL  Neck WDL  Breast/Chest WDL  Shoulder Blades WDL  Spine WDL  (R) Arm/Elbow/Hand WDL  (L) Arm/Elbow/Hand WDL  Abdomen WDL  Groin WDL  Scrotum/Coccyx/Buttocks WDL  (R) Leg WDL  (L) Leg WDL  (R) Heel/Foot/Toe Amputated toes  (L) Heel/Foot/Toe WDL          Devices In Places Pulse Ox      Interventions In Place N/A    Possible Skin Injury No    Pictures Uploaded Into Epic N/A  Wound Consult Placed N/A  RN Wound Prevention Protocol Ordered No

## 2022-11-29 PROBLEM — E66.9 OBESITY (BMI 35.0-39.9 WITHOUT COMORBIDITY): Status: ACTIVE | Noted: 2022-11-11

## 2022-11-29 LAB
ALBUMIN SERPL BCP-MCNC: 1.9 G/DL (ref 3.2–4.9)
ALBUMIN/GLOB SERPL: 0.8 G/DL
ALP SERPL-CCNC: 52 U/L (ref 30–99)
ALT SERPL-CCNC: 16 U/L (ref 2–50)
ANION GAP SERPL CALC-SCNC: 6 MMOL/L (ref 7–16)
AST SERPL-CCNC: 19 U/L (ref 12–45)
BASOPHILS # BLD AUTO: 0.3 % (ref 0–1.8)
BASOPHILS # BLD: 0.05 K/UL (ref 0–0.12)
BILIRUB SERPL-MCNC: 1.6 MG/DL (ref 0.1–1.5)
BUN SERPL-MCNC: 34 MG/DL (ref 8–22)
CALCIUM SERPL-MCNC: 7.5 MG/DL (ref 8.5–10.5)
CHLORIDE SERPL-SCNC: 107 MMOL/L (ref 96–112)
CO2 SERPL-SCNC: 19 MMOL/L (ref 20–33)
CREAT SERPL-MCNC: 2.39 MG/DL (ref 0.5–1.4)
EOSINOPHIL # BLD AUTO: 0.21 K/UL (ref 0–0.51)
EOSINOPHIL NFR BLD: 1.1 % (ref 0–6.9)
ERYTHROCYTE [DISTWIDTH] IN BLOOD BY AUTOMATED COUNT: 46.8 FL (ref 35.9–50)
GFR SERPLBLD CREATININE-BSD FMLA CKD-EPI: 31 ML/MIN/1.73 M 2
GLOBULIN SER CALC-MCNC: 2.3 G/DL (ref 1.9–3.5)
GLUCOSE BLD STRIP.AUTO-MCNC: 131 MG/DL (ref 65–99)
GLUCOSE BLD STRIP.AUTO-MCNC: 134 MG/DL (ref 65–99)
GLUCOSE BLD STRIP.AUTO-MCNC: 138 MG/DL (ref 65–99)
GLUCOSE BLD STRIP.AUTO-MCNC: 144 MG/DL (ref 65–99)
GLUCOSE BLD STRIP.AUTO-MCNC: 149 MG/DL (ref 65–99)
GLUCOSE BLD STRIP.AUTO-MCNC: 177 MG/DL (ref 65–99)
GLUCOSE BLD STRIP.AUTO-MCNC: 194 MG/DL (ref 65–99)
GLUCOSE BLD STRIP.AUTO-MCNC: 199 MG/DL (ref 65–99)
GLUCOSE BLD STRIP.AUTO-MCNC: 205 MG/DL (ref 65–99)
GLUCOSE BLD STRIP.AUTO-MCNC: 211 MG/DL (ref 65–99)
GLUCOSE BLD STRIP.AUTO-MCNC: 219 MG/DL (ref 65–99)
GLUCOSE BLD STRIP.AUTO-MCNC: 237 MG/DL (ref 65–99)
GLUCOSE BLD STRIP.AUTO-MCNC: 265 MG/DL (ref 65–99)
GLUCOSE BLD STRIP.AUTO-MCNC: 27 MG/DL (ref 65–99)
GLUCOSE SERPL-MCNC: 159 MG/DL (ref 65–99)
HCT VFR BLD AUTO: 34.1 % (ref 42–52)
HGB BLD-MCNC: 11.1 G/DL (ref 14–18)
IMM GRANULOCYTES # BLD AUTO: 0.17 K/UL (ref 0–0.11)
IMM GRANULOCYTES NFR BLD AUTO: 0.9 % (ref 0–0.9)
LYMPHOCYTES # BLD AUTO: 2.21 K/UL (ref 1–4.8)
LYMPHOCYTES NFR BLD: 11.5 % (ref 22–41)
MAGNESIUM SERPL-MCNC: 1.6 MG/DL (ref 1.5–2.5)
MCH RBC QN AUTO: 30.4 PG (ref 27–33)
MCHC RBC AUTO-ENTMCNC: 32.6 G/DL (ref 33.7–35.3)
MCV RBC AUTO: 93.4 FL (ref 81.4–97.8)
MONOCYTES # BLD AUTO: 2.14 K/UL (ref 0–0.85)
MONOCYTES NFR BLD AUTO: 11.1 % (ref 0–13.4)
NEUTROPHILS # BLD AUTO: 14.5 K/UL (ref 1.82–7.42)
NEUTROPHILS NFR BLD: 75.1 % (ref 44–72)
NRBC # BLD AUTO: 0 K/UL
NRBC BLD-RTO: 0 /100 WBC
PHOSPHATE SERPL-MCNC: 3.1 MG/DL (ref 2.5–4.5)
PLATELET # BLD AUTO: 154 K/UL (ref 164–446)
PMV BLD AUTO: 11.6 FL (ref 9–12.9)
POTASSIUM SERPL-SCNC: 4.4 MMOL/L (ref 3.6–5.5)
PROT SERPL-MCNC: 4.2 G/DL (ref 6–8.2)
RBC # BLD AUTO: 3.65 M/UL (ref 4.7–6.1)
SODIUM SERPL-SCNC: 132 MMOL/L (ref 135–145)
WBC # BLD AUTO: 19.3 K/UL (ref 4.8–10.8)

## 2022-11-29 PROCEDURE — 82962 GLUCOSE BLOOD TEST: CPT

## 2022-11-29 PROCEDURE — 700102 HCHG RX REV CODE 250 W/ 637 OVERRIDE(OP): Performed by: NURSE PRACTITIONER

## 2022-11-29 PROCEDURE — 700111 HCHG RX REV CODE 636 W/ 250 OVERRIDE (IP): Performed by: INTERNAL MEDICINE

## 2022-11-29 PROCEDURE — 700111 HCHG RX REV CODE 636 W/ 250 OVERRIDE (IP): Mod: JA | Performed by: INTERNAL MEDICINE

## 2022-11-29 PROCEDURE — A9270 NON-COVERED ITEM OR SERVICE: HCPCS | Performed by: INTERNAL MEDICINE

## 2022-11-29 PROCEDURE — 700102 HCHG RX REV CODE 250 W/ 637 OVERRIDE(OP): Performed by: INTERNAL MEDICINE

## 2022-11-29 PROCEDURE — 700101 HCHG RX REV CODE 250: Performed by: INTERNAL MEDICINE

## 2022-11-29 PROCEDURE — 84100 ASSAY OF PHOSPHORUS: CPT

## 2022-11-29 PROCEDURE — 99223 1ST HOSP IP/OBS HIGH 75: CPT | Performed by: INTERNAL MEDICINE

## 2022-11-29 PROCEDURE — 83735 ASSAY OF MAGNESIUM: CPT

## 2022-11-29 PROCEDURE — 80053 COMPREHEN METABOLIC PANEL: CPT

## 2022-11-29 PROCEDURE — 99233 SBSQ HOSP IP/OBS HIGH 50: CPT | Performed by: HOSPITALIST

## 2022-11-29 PROCEDURE — 85025 COMPLETE CBC W/AUTO DIFF WBC: CPT

## 2022-11-29 PROCEDURE — A9270 NON-COVERED ITEM OR SERVICE: HCPCS | Performed by: NURSE PRACTITIONER

## 2022-11-29 PROCEDURE — 700111 HCHG RX REV CODE 636 W/ 250 OVERRIDE (IP): Performed by: HOSPITALIST

## 2022-11-29 PROCEDURE — 770020 HCHG ROOM/CARE - TELE (206)

## 2022-11-29 PROCEDURE — 700105 HCHG RX REV CODE 258: Performed by: INTERNAL MEDICINE

## 2022-11-29 RX ORDER — SODIUM BICARBONATE 650 MG/1
1300 TABLET ORAL 2 TIMES DAILY
Status: DISCONTINUED | OUTPATIENT
Start: 2022-11-29 | End: 2022-12-04 | Stop reason: HOSPADM

## 2022-11-29 RX ORDER — MAGNESIUM SULFATE HEPTAHYDRATE 40 MG/ML
2 INJECTION, SOLUTION INTRAVENOUS ONCE
Status: COMPLETED | OUTPATIENT
Start: 2022-11-29 | End: 2022-11-29

## 2022-11-29 RX ORDER — FUROSEMIDE 10 MG/ML
80 INJECTION INTRAMUSCULAR; INTRAVENOUS
Status: DISCONTINUED | OUTPATIENT
Start: 2022-11-29 | End: 2022-12-04 | Stop reason: HOSPADM

## 2022-11-29 RX ADMIN — CARVEDILOL 12.5 MG: 12.5 TABLET, FILM COATED ORAL at 06:35

## 2022-11-29 RX ADMIN — SODIUM BICARBONATE 1300 MG: 650 TABLET ORAL at 22:45

## 2022-11-29 RX ADMIN — ASPIRIN 81 MG: 81 TABLET, COATED ORAL at 06:37

## 2022-11-29 RX ADMIN — MAGNESIUM SULFATE HEPTAHYDRATE 2 G: 40 INJECTION, SOLUTION INTRAVENOUS at 10:45

## 2022-11-29 RX ADMIN — INSULIN HUMAN 6 UNITS: 100 INJECTION, SOLUTION PARENTERAL at 22:40

## 2022-11-29 RX ADMIN — INSULIN HUMAN 6 UNITS: 100 INJECTION, SOLUTION PARENTERAL at 18:06

## 2022-11-29 RX ADMIN — ATORVASTATIN CALCIUM 80 MG: 80 TABLET, FILM COATED ORAL at 17:05

## 2022-11-29 RX ADMIN — OCTREOTIDE ACETATE 100 MCG/HR: 200 INJECTION, SOLUTION INTRAVENOUS; SUBCUTANEOUS at 06:37

## 2022-11-29 RX ADMIN — CHOLESTYRAMINE 4 G: 4 POWDER, FOR SUSPENSION ORAL at 18:09

## 2022-11-29 RX ADMIN — HYDROMORPHONE HYDROCHLORIDE 0.5 MG: 1 INJECTION, SOLUTION INTRAMUSCULAR; INTRAVENOUS; SUBCUTANEOUS at 15:43

## 2022-11-29 RX ADMIN — CARVEDILOL 12.5 MG: 12.5 TABLET, FILM COATED ORAL at 17:06

## 2022-11-29 RX ADMIN — ENALAPRIL MALEATE 20 MG: 10 TABLET ORAL at 06:36

## 2022-11-29 RX ADMIN — ENOXAPARIN SODIUM 40 MG: 40 INJECTION SUBCUTANEOUS at 17:06

## 2022-11-29 RX ADMIN — HYDROMORPHONE HYDROCHLORIDE 0.5 MG: 1 INJECTION, SOLUTION INTRAMUSCULAR; INTRAVENOUS; SUBCUTANEOUS at 03:46

## 2022-11-29 RX ADMIN — SODIUM CHLORIDE: 4 INJECTION, SOLUTION, CONCENTRATE INTRAVENOUS at 03:47

## 2022-11-29 RX ADMIN — FUROSEMIDE 40 MG: 10 INJECTION, SOLUTION INTRAMUSCULAR; INTRAVENOUS at 06:37

## 2022-11-29 RX ADMIN — THIAMINE HCL TAB 100 MG 100 MG: 100 TAB at 06:36

## 2022-11-29 RX ADMIN — FUROSEMIDE 80 MG: 10 INJECTION, SOLUTION INTRAMUSCULAR; INTRAVENOUS at 22:46

## 2022-11-29 RX ADMIN — FUROSEMIDE 40 MG: 10 INJECTION, SOLUTION INTRAMUSCULAR; INTRAVENOUS at 15:17

## 2022-11-29 RX ADMIN — CHOLESTYRAMINE 4 G: 4 POWDER, FOR SUSPENSION ORAL at 10:02

## 2022-11-29 RX ADMIN — HYDROMORPHONE HYDROCHLORIDE 0.5 MG: 1 INJECTION, SOLUTION INTRAMUSCULAR; INTRAVENOUS; SUBCUTANEOUS at 22:46

## 2022-11-29 RX ADMIN — HYDROMORPHONE HYDROCHLORIDE 0.5 MG: 1 INJECTION, SOLUTION INTRAMUSCULAR; INTRAVENOUS; SUBCUTANEOUS at 08:22

## 2022-11-29 RX ADMIN — POTASSIUM CHLORIDE 20 MEQ: 1500 TABLET, EXTENDED RELEASE ORAL at 17:06

## 2022-11-29 RX ADMIN — INSULIN HUMAN 6 UNITS: 100 INJECTION, SOLUTION PARENTERAL at 15:13

## 2022-11-29 RX ADMIN — POTASSIUM CHLORIDE 20 MEQ: 1500 TABLET, EXTENDED RELEASE ORAL at 06:36

## 2022-11-29 RX ADMIN — OMEPRAZOLE 20 MG: 20 CAPSULE, DELAYED RELEASE ORAL at 06:36

## 2022-11-29 RX ADMIN — ENALAPRIL MALEATE 20 MG: 10 TABLET ORAL at 17:06

## 2022-11-29 ASSESSMENT — COGNITIVE AND FUNCTIONAL STATUS - GENERAL
SUGGESTED CMS G CODE MODIFIER MOBILITY: CH
DAILY ACTIVITIY SCORE: 24
MOBILITY SCORE: 24
SUGGESTED CMS G CODE MODIFIER DAILY ACTIVITY: CH

## 2022-11-29 ASSESSMENT — ENCOUNTER SYMPTOMS
CHILLS: 0
SHORTNESS OF BREATH: 1
PALPITATIONS: 0
FEVER: 0
SENSORY CHANGE: 0
ABDOMINAL PAIN: 0
NERVOUS/ANXIOUS: 0
COUGH: 0
HEADACHES: 0
BACK PAIN: 0
DIZZINESS: 0
SORE THROAT: 0
SHORTNESS OF BREATH: 0
SPEECH CHANGE: 0
STRIDOR: 0
DEPRESSION: 0
VOMITING: 0
DIARRHEA: 0
EYE DISCHARGE: 0
NAUSEA: 0

## 2022-11-29 ASSESSMENT — PAIN DESCRIPTION - PAIN TYPE
TYPE: ACUTE PAIN

## 2022-11-29 NOTE — PROGRESS NOTES
..4 Eyes Skin Assessment Completed by JULIANO Capps and JULIANO Camara.    Head WDL  Ears WDL  Nose WDL  Mouth WDL  Neck WDL  Breast/Chest WDL  Shoulder Blades WDL  Spine WDL  (R) Arm/Elbow/Hand WDL  (L) Arm/Elbow/Hand WDL  Abdomen WDL Bruising  Groin WDL  Scrotum/Coccyx/Buttocks WDL  (R) Leg Edema  (L) Leg Edema  (R) Heel/Foot/Toe Edema amputation  (L) Heel/Foot/Toe Edema amputation          Devices In Places ECG, Tele Box, and Pulse Ox      Interventions In Place N/A    Possible Skin Injury No    Pictures Uploaded Into Epic N/A  Wound Consult Placed N/A  RN Wound Prevention Protocol Ordered Yes

## 2022-11-29 NOTE — CARE PLAN
The patient is Watcher - Medium risk of patient condition declining or worsening    Shift Goals  Clinical Goals: Pain management, monitor I& O, Blood glucose monitoring  Patient Goals: rest  Family Goals: not at bedside    Progress made toward(s) clinical / shift goals:    Problem: Knowledge Deficit - Standard  Goal: Patient and family/care givers will demonstrate understanding of plan of care, disease process/condition, diagnostic tests and medications  Outcome: Progressing     Problem: Pain - Standard  Goal: Alleviation of pain or a reduction in pain to the patient’s comfort goal  Outcome: Progressing       Patient is not progressing towards the following goals:

## 2022-11-29 NOTE — PROGRESS NOTES
[facility] Medicine Daily Progress Note    Date of Service  11/29/2022    Chief Complaint  Abdominal pain and bilateral flank pain.    Hospital Course  Mr. Beni Moore is a 52 y.o. male with a PMHx of diabetes, hyperlipidemia, chronic kidney disease (diabetic nephropathy with nephrotic range proteinuria), and hypertension who presented to the hospital on 11/27/2022 with complaints of abdominal pain and flank pain.      Patient reported he developed flank pain. The pain was located in the center. A CT renal scan was concerning for a right pleural effusion.  He was noted to have severe hypoglycemia at 31 of which hypoglycemia protocol was initiated.  All insulin was held.  Patient ate breakfast with a recheck of blood sugars after breakfast noted at 25.  Hypoglycemia protocol was then initiated with a bolus of dextrose 10% with 25 g. His UA was negative for UTI but did show micoscopic hematuria.      Interval Problem Update  11/29/22: WBC:19.3, Hgb:11.1, BUN:34, Cr:2.39, Alb:1.9, Glu:159.    I have discussed this patient's plan of care and discharge plan at IDT rounds today with Case Management, Nursing, Nursing leadership, and other members of the IDT team.    Consultants/Specialty  critical care    Code Status  Full Code    Disposition  Patient is not medically cleared for discharge.   Anticipate discharge to  be determined .  I have placed the appropriate orders for post-discharge needs.    Review of Systems  Review of Systems   Constitutional:  Positive for malaise/fatigue. Negative for chills and fever.   HENT:  Negative for sore throat.    Eyes:  Negative for discharge.   Respiratory:  Negative for cough, shortness of breath and stridor.    Cardiovascular:  Positive for leg swelling. Negative for chest pain and palpitations.   Gastrointestinal:  Negative for abdominal pain, diarrhea, nausea and vomiting.   Genitourinary:  Negative for dysuria and hematuria.   Musculoskeletal:  Negative for back pain and joint  pain.   Skin:  Negative for rash.   Neurological:  Negative for dizziness, sensory change, speech change and headaches.   Psychiatric/Behavioral:  Negative for depression. The patient is not nervous/anxious.       Physical Exam  Temp:  [36.8 °C (98.3 °F)-37.5 °C (99.5 °F)] 36.8 °C (98.3 °F)  Pulse:  [75-85] 75  Resp:  [11-27] 14  BP: (128-166)/(68-85) 159/85  SpO2:  [89 %-97 %] 94 %    Physical Exam  Vitals reviewed.   Constitutional:       Appearance: Normal appearance. He is obese. He is not diaphoretic.   HENT:      Head: Normocephalic and atraumatic.      Nose: Nose normal.      Mouth/Throat:      Mouth: Mucous membranes are moist.      Pharynx: No oropharyngeal exudate.   Eyes:      General: No scleral icterus.        Right eye: No discharge.         Left eye: No discharge.      Extraocular Movements: Extraocular movements intact.      Conjunctiva/sclera: Conjunctivae normal.   Cardiovascular:      Rate and Rhythm: Normal rate and regular rhythm.      Pulses:           Radial pulses are 2+ on the right side and 2+ on the left side.        Dorsalis pedis pulses are 2+ on the right side and 2+ on the left side.      Heart sounds: No murmur heard.  Pulmonary:      Effort: Pulmonary effort is normal. No respiratory distress.      Breath sounds: Normal breath sounds. No wheezing or rales.   Abdominal:      General: Bowel sounds are normal. There is no distension.      Palpations: Abdomen is soft.      Tenderness: There is no abdominal tenderness.   Musculoskeletal:         General: No swelling or tenderness.      Cervical back: No tenderness. No muscular tenderness.      Right lower leg: Edema present.      Left lower leg: Edema present.   Lymphadenopathy:      Cervical: No cervical adenopathy.   Skin:     Coloration: Skin is not jaundiced or pale.   Neurological:      General: No focal deficit present.      Mental Status: He is alert and oriented to person, place, and time. Mental status is at baseline.       Cranial Nerves: No cranial nerve deficit.   Psychiatric:         Mood and Affect: Mood normal.         Behavior: Behavior normal.       Fluids    Intake/Output Summary (Last 24 hours) at 11/29/2022 1634  Last data filed at 11/29/2022 1500  Gross per 24 hour   Intake 2585.18 ml   Output 2825 ml   Net -239.82 ml       Laboratory  Recent Labs     11/27/22  1106 11/28/22  0334 11/29/22  0348   WBC 11.8* 19.2* 19.3*   RBC 3.47* 3.46* 3.65*   HEMOGLOBIN 10.5* 10.6* 11.1*   HEMATOCRIT 31.3* 31.7* 34.1*   MCV 90.2 91.6 93.4   MCH 30.3 30.6 30.4   MCHC 33.5* 33.4* 32.6*   RDW 43.2 45.1 46.8   PLATELETCT 170 152* 154*   MPV 11.2 11.1 11.6     Recent Labs     11/28/22  0334 11/28/22  1453 11/29/22  0348   SODIUM 139 135 132*   POTASSIUM 4.6 4.3 4.4   CHLORIDE 111 109 107   CO2 21 19* 19*   GLUCOSE 58* 54* 159*   BUN 34* 35* 34*   CREATININE 1.96* 2.14* 2.39*   CALCIUM 7.5* 7.5* 7.5*                   Imaging  DX-CHEST-PORTABLE (1 VIEW)   Final Result      Hazy opacification in the right lung is similar to the prior exam and likely represents layering pleural fluid      CT-RENAL COLIC EVALUATION(A/P W/O)   Final Result      1.  No renal or ureteral calculi. No hydronephrosis      2.   Small-to-moderate amount of ascites around the liver and spleen and extending into the pelvis      3.  Partially visualized moderate right pleural effusion with adjacent atelectasis.      4.  Diffuse subcutaneous edema is consistent with anasarca      5.  Cholelithiasis.      6.  Mild diverticulosis.      7.  Calcification pancreatic head is grossly unchanged from the prior CT.           Assessment/Plan  * Fluid overload- (present on admission)  Assessment & Plan  Remains with fluid overload upon admit despite he has been receiving p.o. Lasix 60mg.  IV lasix  Monitor input and output.  Right pleural effusion on CT,small to moderate ascites, pitting edema bilateral legs to mid abdomen  11/18/22 Echocardiogram:  CONCLUSIONS  The left ventricular  ejection fraction is visually estimated to be 60%.  Trace tricuspid regurgitation.  Unable to estimate right ventricular systolic pressure due to an   inadequate tricuspid regurgitant jet.    Pleural effusion  Assessment & Plan  pleural effusion on CT renal colic.  chest x-ray it did not show significant pleural effusion and he is maintaining ox saturation on room air.  Hold thoracentesis for now.  Continue with diuresis    Anasarca  Assessment & Plan  Remains with fluid overload upon admit despite he has been receiving p.o. Lasix 60mg.  IV lasix  Serum Albumin is low.  Monitor input and output.  Right pleural effusion on CT,small to moderate ascites, pitting edema bilateral legs to mid abdomen    Serum albumin decreased- (present on admission)  Assessment & Plan  Secondary renal disease  Nephrotic syndrome vs liver disease.  monitor    Obesity (BMI 35.0-39.9 without comorbidity)  Assessment & Plan  Body mass index is 35.43 kg/m².  Volume overload and should improve with diuresis.    Presence of stent in coronary artery- (present on admission)  Assessment & Plan  Continue outpatient medication therapy  Patient follows with Dr. Andrew Engel, cardiologist  He does not have symptoms of chest pain.    Hypoglycemia  Assessment & Plan  Medical reconciliation report did not mention the patient's glimepiride patient has been alerted he is not to take this at home.  Monitoring blood sugars with Accu-Cheks at mealtimes.  He has a hypoglycemic protocol  Healthy diabetic diet  Patient did require dextrose IV fluids during admission for hyperglycemia    Type 2 diabetes mellitus with hyperglycemia, with long-term current use of insulin ([facility])- (present on admission)  Assessment & Plan  glargine 10 units being held  SSI restarted  He is to stop glimepiride oral med  Monitor Accu-Cheks cover sliding scale insulin  Diabetic diet  Hypoglycemic protocol  Glycohemoglobin 5.5 in past 3 weeks    Stage 3a chronic kidney disease (HCC)-  (present on admission)  Assessment & Plan  He found to have chronic kidney disease most likely secondary to diabetes per  Reviewed his biopsy results.  His renal function has improved since his prior evaluation.  Continue ACE inhibitor I am not holding it in the setting of diabetic nephropathy with nephrotic range proteinuria.  Continue monitor renal functions per  Avoid nephrotoxins  If his renal function worsen he may need nephrology consult.  Nephrology was consulted on his last admission.       VTE prophylaxis: enoxaparin ppx    I have performed a physical exam and reviewed and updated ROS and Plan today (11/29/2022). In review of yesterday's note (11/28/2022), there are no changes except as documented above.

## 2022-11-29 NOTE — PROGRESS NOTES
2100 Patient stated pain level 6-6/5/10, patient unale to take oral anelgesic until 4 hours host  [id number] which was given at 1827 and unable to give morphine due to q6h interval. Contacted Dr. Jones, referred to Dr Kaplan, above discussed, new orders rec'd for hydromorphone, and given once released by pharmacy. Patient education provided, stayed with patient to observe any reaction, none noted, patient aware to use call bell, pain reassessed 30 min later to good effect, see flow sheet.

## 2022-11-29 NOTE — PROGRESS NOTES
52 year old male - I was called to request higher level of care for hypoglycemia. He is awake, alert, abd s/nt, pitting edema but wwp ext with prior amputation, RRR, resp unlabored, neuro intact.     He tells me he takes glimepiride and has renal dysfunction - he likely has an unintentional sulfourea overdose. I have ordered octreotide, D10, q1 glucose x 6 hours and IMCU transfer.    Theo Hung M.D.

## 2022-11-29 NOTE — CARE PLAN
The patient is Stable - Low risk of patient condition declining or worsening    Shift Goals  Clinical Goals: Pain management, monitor I& O, Blood glucose monitoring  Patient Goals: rest  Family Goals: not at bedside    Progress made toward(s) clinical / shift goals:    Problem: Knowledge Deficit - Standard  Goal: Patient and family/care givers will demonstrate understanding of plan of care, disease process/condition, diagnostic tests and medications  11/29/2022 0110 by Dominga Benitez R.N.  Outcome: Progressing  11/29/2022 0109 by CHARLOTTE More.N.  Outcome: Progressing     Problem: Pain - Standard  Goal: Alleviation of pain or a reduction in pain to the patient’s comfort goal  11/29/2022 0110 by Dominga Benitez R.N.  Outcome: Progressing  11/29/2022 0109 by Dominga Benitez R.N.  Outcome: Progressing       Patient is not progressing towards the following goals:

## 2022-11-29 NOTE — PROGRESS NOTES
RVR rate 148 , 19 beat run which converted back into NSR. Patient had been ambulating to side of bed to void and provoked prior pain located in abdomen. Patient states pain, above rhythm strip was noted upon ambulation. Patient given prn opioid for discomfort, denies CP, denies palpitations and SOB. MD made aware of above, no new orders rec'd at this time

## 2022-11-30 ENCOUNTER — APPOINTMENT (OUTPATIENT)
Dept: RADIOLOGY | Facility: MEDICAL CENTER | Age: 52
DRG: 637 | End: 2022-11-30
Attending: HOSPITALIST
Payer: MEDICARE

## 2022-11-30 PROBLEM — D69.6 THROMBOCYTOPENIA (HCC): Status: ACTIVE | Noted: 2022-11-30

## 2022-11-30 PROBLEM — D72.829 LEUKOCYTOSIS: Status: ACTIVE | Noted: 2022-11-30

## 2022-11-30 LAB
ALBUMIN SERPL BCP-MCNC: 1.5 G/DL (ref 3.2–4.9)
ALBUMIN/GLOB SERPL: 0.6 G/DL
ALP SERPL-CCNC: 56 U/L (ref 30–99)
ALT SERPL-CCNC: 17 U/L (ref 2–50)
ANION GAP SERPL CALC-SCNC: 6 MMOL/L (ref 7–16)
AST SERPL-CCNC: 20 U/L (ref 12–45)
BASOPHILS # BLD AUTO: 0.4 % (ref 0–1.8)
BASOPHILS # BLD: 0.06 K/UL (ref 0–0.12)
BILIRUB SERPL-MCNC: 1.2 MG/DL (ref 0.1–1.5)
BUN SERPL-MCNC: 39 MG/DL (ref 8–22)
CALCIUM SERPL-MCNC: 7.6 MG/DL (ref 8.5–10.5)
CHLORIDE SERPL-SCNC: 106 MMOL/L (ref 96–112)
CO2 SERPL-SCNC: 21 MMOL/L (ref 20–33)
CREAT SERPL-MCNC: 2.25 MG/DL (ref 0.5–1.4)
EOSINOPHIL # BLD AUTO: 0.5 K/UL (ref 0–0.51)
EOSINOPHIL NFR BLD: 3.3 % (ref 0–6.9)
ERYTHROCYTE [DISTWIDTH] IN BLOOD BY AUTOMATED COUNT: 43.2 FL (ref 35.9–50)
EST. AVERAGE GLUCOSE BLD GHB EST-MCNC: 105 MG/DL
GFR SERPLBLD CREATININE-BSD FMLA CKD-EPI: 34 ML/MIN/1.73 M 2
GLOBULIN SER CALC-MCNC: 2.6 G/DL (ref 1.9–3.5)
GLUCOSE BLD STRIP.AUTO-MCNC: 131 MG/DL (ref 65–99)
GLUCOSE BLD STRIP.AUTO-MCNC: 135 MG/DL (ref 65–99)
GLUCOSE BLD STRIP.AUTO-MCNC: 162 MG/DL (ref 65–99)
GLUCOSE BLD STRIP.AUTO-MCNC: 179 MG/DL (ref 65–99)
GLUCOSE SERPL-MCNC: 147 MG/DL (ref 65–99)
HBA1C MFR BLD: 5.3 % (ref 4–5.6)
HCT VFR BLD AUTO: 30.6 % (ref 42–52)
HGB BLD-MCNC: 10.2 G/DL (ref 14–18)
IMM GRANULOCYTES # BLD AUTO: 0.09 K/UL (ref 0–0.11)
IMM GRANULOCYTES NFR BLD AUTO: 0.6 % (ref 0–0.9)
LYMPHOCYTES # BLD AUTO: 2.11 K/UL (ref 1–4.8)
LYMPHOCYTES NFR BLD: 14 % (ref 22–41)
MAGNESIUM SERPL-MCNC: 1.7 MG/DL (ref 1.5–2.5)
MCH RBC QN AUTO: 30.1 PG (ref 27–33)
MCHC RBC AUTO-ENTMCNC: 33.3 G/DL (ref 33.7–35.3)
MCV RBC AUTO: 90.3 FL (ref 81.4–97.8)
MONOCYTES # BLD AUTO: 0.95 K/UL (ref 0–0.85)
MONOCYTES NFR BLD AUTO: 6.3 % (ref 0–13.4)
NEUTROPHILS # BLD AUTO: 11.36 K/UL (ref 1.82–7.42)
NEUTROPHILS NFR BLD: 75.4 % (ref 44–72)
NRBC # BLD AUTO: 0 K/UL
NRBC BLD-RTO: 0 /100 WBC
PHOSPHATE SERPL-MCNC: 3.8 MG/DL (ref 2.5–4.5)
PLATELET # BLD AUTO: 163 K/UL (ref 164–446)
PMV BLD AUTO: 11.5 FL (ref 9–12.9)
POTASSIUM SERPL-SCNC: 4.2 MMOL/L (ref 3.6–5.5)
PROT SERPL-MCNC: 4.1 G/DL (ref 6–8.2)
RBC # BLD AUTO: 3.39 M/UL (ref 4.7–6.1)
SODIUM SERPL-SCNC: 133 MMOL/L (ref 135–145)
WBC # BLD AUTO: 15.1 K/UL (ref 4.8–10.8)

## 2022-11-30 PROCEDURE — 700111 HCHG RX REV CODE 636 W/ 250 OVERRIDE (IP): Performed by: INTERNAL MEDICINE

## 2022-11-30 PROCEDURE — 83735 ASSAY OF MAGNESIUM: CPT

## 2022-11-30 PROCEDURE — A9270 NON-COVERED ITEM OR SERVICE: HCPCS | Performed by: INTERNAL MEDICINE

## 2022-11-30 PROCEDURE — 700102 HCHG RX REV CODE 250 W/ 637 OVERRIDE(OP): Performed by: HOSPITALIST

## 2022-11-30 PROCEDURE — 85025 COMPLETE CBC W/AUTO DIFF WBC: CPT

## 2022-11-30 PROCEDURE — 700102 HCHG RX REV CODE 250 W/ 637 OVERRIDE(OP): Performed by: INTERNAL MEDICINE

## 2022-11-30 PROCEDURE — 99233 SBSQ HOSP IP/OBS HIGH 50: CPT | Performed by: INTERNAL MEDICINE

## 2022-11-30 PROCEDURE — 71045 X-RAY EXAM CHEST 1 VIEW: CPT

## 2022-11-30 PROCEDURE — 36415 COLL VENOUS BLD VENIPUNCTURE: CPT

## 2022-11-30 PROCEDURE — 700102 HCHG RX REV CODE 250 W/ 637 OVERRIDE(OP): Performed by: NURSE PRACTITIONER

## 2022-11-30 PROCEDURE — 83036 HEMOGLOBIN GLYCOSYLATED A1C: CPT

## 2022-11-30 PROCEDURE — A9270 NON-COVERED ITEM OR SERVICE: HCPCS | Performed by: NURSE PRACTITIONER

## 2022-11-30 PROCEDURE — 99233 SBSQ HOSP IP/OBS HIGH 50: CPT | Performed by: HOSPITALIST

## 2022-11-30 PROCEDURE — 80053 COMPREHEN METABOLIC PANEL: CPT

## 2022-11-30 PROCEDURE — 82962 GLUCOSE BLOOD TEST: CPT

## 2022-11-30 PROCEDURE — 770020 HCHG ROOM/CARE - TELE (206)

## 2022-11-30 PROCEDURE — 84100 ASSAY OF PHOSPHORUS: CPT

## 2022-11-30 RX ADMIN — ENALAPRIL MALEATE 20 MG: 10 TABLET ORAL at 16:22

## 2022-11-30 RX ADMIN — ATORVASTATIN CALCIUM 80 MG: 80 TABLET, FILM COATED ORAL at 16:22

## 2022-11-30 RX ADMIN — OXYCODONE 5 MG: 5 TABLET ORAL at 01:20

## 2022-11-30 RX ADMIN — OMEPRAZOLE 20 MG: 20 CAPSULE, DELAYED RELEASE ORAL at 04:27

## 2022-11-30 RX ADMIN — THIAMINE HCL TAB 100 MG 100 MG: 100 TAB at 04:27

## 2022-11-30 RX ADMIN — CARVEDILOL 12.5 MG: 12.5 TABLET, FILM COATED ORAL at 07:43

## 2022-11-30 RX ADMIN — POTASSIUM CHLORIDE 20 MEQ: 1500 TABLET, EXTENDED RELEASE ORAL at 16:22

## 2022-11-30 RX ADMIN — POTASSIUM CHLORIDE 20 MEQ: 1500 TABLET, EXTENDED RELEASE ORAL at 04:28

## 2022-11-30 RX ADMIN — OXYCODONE 5 MG: 5 TABLET ORAL at 06:22

## 2022-11-30 RX ADMIN — CHOLESTYRAMINE 4 G: 4 POWDER, FOR SUSPENSION ORAL at 11:01

## 2022-11-30 RX ADMIN — INSULIN HUMAN 3 UNITS: 100 INJECTION, SOLUTION PARENTERAL at 16:30

## 2022-11-30 RX ADMIN — ASPIRIN 81 MG: 81 TABLET, COATED ORAL at 04:27

## 2022-11-30 RX ADMIN — SODIUM BICARBONATE 1300 MG: 650 TABLET ORAL at 04:27

## 2022-11-30 RX ADMIN — FUROSEMIDE 80 MG: 10 INJECTION, SOLUTION INTRAMUSCULAR; INTRAVENOUS at 16:22

## 2022-11-30 RX ADMIN — CHOLESTYRAMINE 4 G: 4 POWDER, FOR SUSPENSION ORAL at 18:17

## 2022-11-30 RX ADMIN — INSULIN HUMAN 3 UNITS: 100 INJECTION, SOLUTION PARENTERAL at 20:39

## 2022-11-30 RX ADMIN — FUROSEMIDE 80 MG: 10 INJECTION, SOLUTION INTRAMUSCULAR; INTRAVENOUS at 04:28

## 2022-11-30 RX ADMIN — SODIUM BICARBONATE 1300 MG: 650 TABLET ORAL at 16:22

## 2022-11-30 RX ADMIN — CARVEDILOL 12.5 MG: 12.5 TABLET, FILM COATED ORAL at 16:22

## 2022-11-30 RX ADMIN — ENOXAPARIN SODIUM 40 MG: 40 INJECTION SUBCUTANEOUS at 16:22

## 2022-11-30 RX ADMIN — ENALAPRIL MALEATE 20 MG: 10 TABLET ORAL at 04:27

## 2022-11-30 ASSESSMENT — ENCOUNTER SYMPTOMS
NERVOUS/ANXIOUS: 1
HEMOPTYSIS: 0
DOUBLE VISION: 0
FEVER: 0
NAUSEA: 0
MYALGIAS: 1
PHOTOPHOBIA: 0
COUGH: 0
HEARTBURN: 0
SPUTUM PRODUCTION: 0
FOCAL WEAKNESS: 0
VOMITING: 0
SHORTNESS OF BREATH: 0
SORE THROAT: 0
BLURRED VISION: 0
ABDOMINAL PAIN: 0
HEADACHES: 0

## 2022-11-30 ASSESSMENT — PAIN DESCRIPTION - PAIN TYPE
TYPE: ACUTE PAIN

## 2022-11-30 ASSESSMENT — FIBROSIS 4 INDEX: FIB4 SCORE: 1.67

## 2022-11-30 NOTE — CARE PLAN
The patient is Stable - Low risk of patient condition declining or worsening    Shift Goals  Clinical Goals: diurese patient with lasix  Patient Goals: rest  Family Goals: not at bedside    Progress made toward(s) clinical / shift goals:    Problem: Knowledge Deficit - Standard  Goal: Patient and family/care givers will demonstrate understanding of plan of care, disease process/condition, diagnostic tests and medications  Outcome: Progressing     Problem: Pain - Standard  Goal: Alleviation of pain or a reduction in pain to the patient’s comfort goal  Outcome: Progressing       Patient is not progressing towards the following goals:

## 2022-11-30 NOTE — PROGRESS NOTES
Pt transported to tele with [facility] tech. Pt ambulatory to bed. Pt connected to tele monitor, monitor room notified, rate and rhythm verified. Pt assessment completed. Pt oriented to room. Plan of care discussed with pt, all questions answered at this time.

## 2022-11-30 NOTE — PROGRESS NOTES
Patients loose BM is norm due to cholestyramine medication  Patient to transfer to room T 801, bed not ready at this time. Patient belongings collected for transfer, patients  clothing, shoes, medications, cell phone  and sprite and bag of chips in bag. Patient reports improved pain level.

## 2022-11-30 NOTE — ASSESSMENT & PLAN NOTE
Remains with fluid overload upon admit despite he has been receiving p.o. Lasix 60mg.   -- Nephrology following, started on IV lasix, I/os   -- Echocardiogram showed EF of 60% trace TR

## 2022-11-30 NOTE — PROGRESS NOTES
Hospital Medicine Daily Progress Note    Date of Service  11/30/2022    Chief Complaint  Beni Moore is a 52 y.o. male admitted 11/27/2022 with   Chief Complaint   Patient presents with    Abdominal Pain    Flank Pain     Pt BIB EMS, report that pt has bilat flank/Abd pain. Pt reports recurrent complaint, hx of Kidney stones.            Hospital Course  This is  a 52-year-old male with a past medical history significant for type 2 diabetes mellitus with glyco of 5.5 , hyperlipidemia, CKD, hypertension presented to ER on 11/27/2022 with a complaint of abdominal pain and flank pain.    CT renal concerning for right pleural effusion, noted to have severe hypoglycemia with a blood glucose of 31.  During the stay in the hospital, nephrology was consulted, nephrology started the patient on IV Lasix 80 mg bid    Interval events:  -- No acute events overnight, medicine has been stable, heart rate 66-70, blood pressure 161/85 and 92% on room air.  Patient is alert, awake, answering questions appropriately, he is noted to have 2+ pitting edema in his bilateral lower extremity.  --He is noted to have biopsy-proven diabetic nephropathy  --Nephrology, will continue IV Lasix as per nephrology recommendation.    Plan of care has been discussed the patient, nursing staff, case management.      Consultants/Specialty  nephrology    Code Status  Full Code    Disposition  Patient is not medically cleared for discharge.   Anticipate discharge to to home with organized home healthcare and close outpatient follow-up.  I have placed the appropriate orders for post-discharge needs.    Review of Systems  Review of Systems   Constitutional:  Positive for malaise/fatigue. Negative for fever.   HENT:  Negative for congestion and sore throat.    Eyes:  Negative for blurred vision, double vision and photophobia.   Respiratory:  Negative for cough, hemoptysis and sputum production.    Cardiovascular:  Positive for leg swelling.    Gastrointestinal:  Negative for abdominal pain, heartburn, nausea and vomiting.   Genitourinary:  Negative for dysuria.   Musculoskeletal:  Positive for myalgias.   Neurological:  Negative for focal weakness and headaches.   Psychiatric/Behavioral:  The patient is nervous/anxious.    All other systems reviewed and are negative.     Physical Exam  Temp:  [36.4 °C (97.5 °F)-37.1 °C (98.8 °F)] 36.8 °C (98.2 °F)  Pulse:  [66-79] 66  Resp:  [11-30] 18  BP: (150-172)/(81-89) 161/85  SpO2:  [92 %-97 %] 92 %    Physical Exam  Vitals and nursing note reviewed.   Constitutional:       Appearance: He is obese.   HENT:      Head: Normocephalic and atraumatic.   Eyes:      Extraocular Movements: Extraocular movements intact.      Pupils: Pupils are equal, round, and reactive to light.   Cardiovascular:      Rate and Rhythm: Normal rate.   Pulmonary:      Breath sounds: Rales present.   Abdominal:      General: There is no distension.      Palpations: Abdomen is soft.   Musculoskeletal:      Cervical back: Neck supple.      Right lower leg: Edema present.      Left lower leg: Edema present.   Neurological:      Mental Status: He is alert and oriented to person, place, and time.      Cranial Nerves: No cranial nerve deficit.   Psychiatric:         Mood and Affect: Mood normal.       Fluids    Intake/Output Summary (Last 24 hours) at 11/30/2022 1246  Last data filed at 11/30/2022 1102  Gross per 24 hour   Intake 616.07 ml   Output 4900 ml   Net -4283.93 ml       Laboratory  Recent Labs     11/28/22  0334 11/29/22  0348 11/30/22  0947   WBC 19.2* 19.3* 15.1*   RBC 3.46* 3.65* 3.39*   HEMOGLOBIN 10.6* 11.1* 10.2*   HEMATOCRIT 31.7* 34.1* 30.6*   MCV 91.6 93.4 90.3   MCH 30.6 30.4 30.1   MCHC 33.4* 32.6* 33.3*   RDW 45.1 46.8 43.2   PLATELETCT 152* 154* 163*   MPV 11.1 11.6 11.5     Recent Labs     11/28/22  1453 11/29/22  0348 11/30/22  0947   SODIUM 135 132* 133*   POTASSIUM 4.3 4.4 4.2   CHLORIDE 109 107 106   CO2 19* 19* 21    GLUCOSE 54* 159* 147*   BUN 35* 34* 39*   CREATININE 2.14* 2.39* 2.25*   CALCIUM 7.5* 7.5* 7.6*                   Imaging  DX-CHEST-PORTABLE (1 VIEW)   Final Result      Hazy opacification in the right lung is similar to the prior exam and likely represents layering pleural fluid      CT-RENAL COLIC EVALUATION(A/P W/O)   Final Result      1.  No renal or ureteral calculi. No hydronephrosis      2.   Small-to-moderate amount of ascites around the liver and spleen and extending into the pelvis      3.  Partially visualized moderate right pleural effusion with adjacent atelectasis.      4.  Diffuse subcutaneous edema is consistent with anasarca      5.  Cholelithiasis.      6.  Mild diverticulosis.      7.  Calcification pancreatic head is grossly unchanged from the prior CT.           Assessment/Plan  * Fluid overload- (present on admission)  Assessment & Plan  Remains with fluid overload upon admit despite he has been receiving p.o. Lasix 60mg.   -- Nephrology following, started on IV lasix, I/os   -- Echocardiogram showed EF of 60% trace TR       Thrombocytopenia (HCC)  Assessment & Plan  Improving, today at 163, monitor , not actvely bleeding     Leukocytosis  Assessment & Plan  At 15.1, chest x-ray showed layering of the pleural fluid, blood and procalcitonin, UA did not show any infection.  Monitor  No antibiotics indicated    Hypoglycemia associated with diabetes (HCC)- (present on admission)  Assessment & Plan  Resolved       Pleural effusion  Assessment & Plan  pleural effusion on CT renal colic.  chest x-ray it did not show significant pleural effusion and he is maintaining ox saturation on room air.  Hold thoracentesis for now.  Continue with diuresis    Anasarca  Assessment & Plan  Remains with fluid overload upon admit despite he has been receiving p.o. Lasix 60mg.   --Low serum albumin, nephrology following, continue IV Lasix, I/os, daily weight, fluid restriction    Serum albumin decreased- (present on  admission)  Assessment & Plan  Secondary renal disease  Nephrotic syndrome vs liver disease.  monitor    Obesity (BMI 35.0-39.9 without comorbidity)  Assessment & Plan  Body mass index is 35.43 kg/m².  Volume overload and should improve with diuresis.    Presence of stent in coronary artery- (present on admission)  Assessment & Plan  Continue outpatient medication therapy  Patient follows with Dr. Andrew Engel, cardiologist  He does not have symptoms of chest pain.    Hypoglycemia  Assessment & Plan  Does not need to be on any oral hypoglycemic medication surgery; clearance of oral hypoglycemic medication impaired in renal failure  Blood glucose improving    Type 2 diabetes mellitus with hyperglycemia, with long-term current use of insulin (HCC)- (present on admission)  Assessment & Plan  Patient does take Lantus along with glimepiride as an outpatient.     -- Stop glimepiride in the light of AXEL on CKD considering decreased clearance for glimepiride    -- Continuse ISS and hypoglycemia protocol, Accu-Cheks ACH S.    Likely patient would not need Lantus and glimepiride upon discharge    Stage 3a chronic kidney disease (HCC)- (present on admission)  Assessment & Plan  He found to have chronic kidney disease most likely secondary to diabetes per; proven by biopsy   -- Nephrology following, will hold ACEI in the light of AXEL on CKD stage III       VTE prophylaxis: heparin ppx

## 2022-11-30 NOTE — CONSULTS
Nephrology Consultation    Date of Service  11/29/2022    Referring Physician  Yosef Antunez D.O.    Consulting Physician  Varun Ortiz M.D.    Reason for Consultation  Management of advanced CKD with diabetic nephropathy    History of Presenting Illness  52 y.o. male with history of CKD of undetermined stage, from biopsy-proven diabetic nephropathy and chronic microangiopathic changes, diabetes, hypertension who presented 11/27/2022 with flank pain and lower extremity edema.    The patient was recently admitted to the hospital in mid November 2022 with ground-level fall and lower extremity edema.  He was discharged on Lasix 60 mg p.o. twice daily.  However, his lower extremity edema continued to worsen, and progressed to scrotal edema and abdominal edema.  The patient also noted that he was having hypoglycemic episodes despite his usual same doses of insulin and glimepiride.  The patient came into the hospital at this time due to abdominal pain and ongoing lower extremity edema.  The patient had imaging that did not show any nephrolithiasis, but did show diffuse anasarca.  The patient does complain of some shortness of breath.  He denies uremic symptoms such as headache, nausea, vomiting, loss of appetite.    Review of Systems  Review of Systems   Constitutional:  Negative for fever.   Respiratory:  Positive for shortness of breath.    Cardiovascular:  Positive for leg swelling. Negative for chest pain.   Gastrointestinal:  Negative for abdominal pain.   All other systems reviewed and are negative.    Past Medical History  Past Medical History:   Diagnosis Date    Anesthesia     Hypotension post-op, persisted for a few months    Bowel habit changes     History GI problems    Cataract     IOL - Left eye    Diabetes (HCC)     Oral medications & insulin    Heart burn     GERD; takes Prilosec    Hemorrhagic disorder (HCC)     ASA protection for stent and cardiac history    Hx of heart artery stent 2019     Hyperlipidemia     Hypertension        Surgical History  Past Surgical History:   Procedure Laterality Date    TOE AMPUTATION Left 2021    Procedure: AMPUTATION, TOE;  Surgeon: Tomer Hart M.D.;  Location: SURGERY [facility];  Service: Orthopedics    STENT PLACEMENT  2019    STEMI with nonobstructive LAD    TOE AMPUTATION Right 2018    Procedure: Transmetatarsal amputation;  Surgeon: Ravi Bernardo M.D.;  Location: SURGERY [facility];  Service: Orthopedics    ACHILLES TENDON REPAIR Right 2018    Procedure: ACHILLES LENGTHENING;  Surgeon: Ravi Bernardo M.D.;  Location: SURGERY [facility];  Service: Orthopedics    IRRIGATION & DEBRIDEMENT ORTHO Right 2018    Procedure: IRRIGATION & DEBRIDEMENT ORTHO;  Surgeon: Ravi Bernardo M.D.;  Location: SURGERY [facility];  Service: Orthopedics    OTHER Left     IOL    OTHER ABDOMINAL SURGERY      OTHER ORTHOPEDIC SURGERY      TONSILLECTOMY      VASECTOMY         Family History  Family History   Problem Relation Age of Onset    No Known Problems Mother     Diabetes Father     Heart Disease Father     Diabetes Maternal Grandmother     Heart Disease Maternal Grandfather     Lung Disease Paternal Grandmother     Cancer Paternal Grandmother     Cancer Paternal Grandfather     Lung Cancer Paternal Grandfather     Kidney Disease Neg Hx        Social History  Social History     Socioeconomic History    Marital status:      Spouse name: Not on file    Number of children: Not on file    Years of education: Not on file    Highest education level: Some college, no degree   Occupational History    Not on file   Tobacco Use    Smoking status: Former     Packs/day: 0.50     Years: 10.00     Pack years: 5.00     Types: Cigarettes     Quit date:      Years since quittin.9    Smokeless tobacco: Never   Vaping Use    Vaping Use: Never used   Substance and Sexual Activity    Alcohol use: No    Drug use: Not Currently     Types: Marijuana,  Oral     Comment: CBD Edibles 3-4 times per week    Sexual activity: Not Currently     Partners: Female   Other Topics Concern    Not on file   Social History Narrative    Not on file     Social Determinants of Health     Financial Resource Strain: Low Risk     Difficulty of Paying Living Expenses: Not very hard   Food Insecurity: No Food Insecurity    Worried About Running Out of Food in the Last Year: Never true    Ran Out of Food in the Last Year: Never true   Transportation Needs: No Transportation Needs    Lack of Transportation (Medical): No    Lack of Transportation (Non-Medical): No   Physical Activity: Unknown    Days of Exercise per Week: Patient refused    Minutes of Exercise per Session: Patient refused   Stress: No Stress Concern Present    Feeling of Stress : Not at all   Social Connections: Moderately Integrated    Frequency of Communication with Friends and Family: More than three times a week    Frequency of Social Gatherings with Friends and Family: Twice a week    Attends Latter day Services: 1 to 4 times per year    Active Member of Clubs or Organizations: No    Attends Club or Organization Meetings: Never    Marital Status:    Intimate Partner Violence: Not on file   Housing Stability: Low Risk     Unable to Pay for Housing in the Last Year: No    Number of Places Lived in the Last Year: 1    Unstable Housing in the Last Year: No       Medications  Current Facility-Administered Medications   Medication Dose Route Frequency Provider Last Rate Last Admin    insulin regular (HumuLIN R,NovoLIN R) injection  3-15 Units Subcutaneous 4X/DAY JOHN Antunez D.O.   6 Units at 11/29/22 1806    furosemide (LASIX) injection 80 mg  80 mg Intravenous BID DIURETIC Varun Ortiz M.D.        thiamine (Vitamin B-1) tablet 100 mg  100 mg Oral DAILY Usman Hermosillo, A.P.R.N.   100 mg at 11/29/22 0636    Pneumococcal 20-Nilda Conj Vacc (PREVNAR 20) syringe 0.5 mL  0.5 mL Intramuscular Once PRN  Abdulaziz Hawkins M.D.        influenza vaccine quad (FLUZONE/FLUARIX) injection 0.5 mL  0.5 mL Intramuscular Once PRN Abdulaziz Hawkins M.D.        HYDROmorphone (Dilaudid) injection 0.5 mg  0.5 mg Intravenous Q3HRS PRN Agustin Zhong M.D.   0.5 mg at 11/29/22 1543    senna-docusate (PERICOLACE or SENOKOT S) 8.6-50 MG per tablet 2 Tablet  2 Tablet Oral BID Karolyn Zavala M.D.   2 Tablet at 11/28/22 1712    And    polyethylene glycol/lytes (MIRALAX) PACKET 1 Packet  1 Packet Oral QDAY PRN Karolyn Zavala M.D.        And    magnesium hydroxide (MILK OF MAGNESIA) suspension 30 mL  30 mL Oral QDAY PRN Karolyn Zavala M.D.        And    bisacodyl (DULCOLAX) suppository 10 mg  10 mg Rectal QDAY PRN Karolyn Zavala M.D.        enoxaparin (Lovenox) inj 40 mg  40 mg Subcutaneous DAILY AT 1800 Karolyn Zavala M.D.   40 mg at 11/29/22 1706    acetaminophen (Tylenol) tablet 650 mg  650 mg Oral Q6HRS PRN Karolyn Zavala M.D.   650 mg at 11/27/22 1923    ondansetron (ZOFRAN) syringe/vial injection 4 mg  4 mg Intravenous Q4HRS PRN Karolyn Zavala M.D.        ondansetron (ZOFRAN ODT) dispertab 4 mg  4 mg Oral Q4HRS PRN Karolyn Zavala M.D.        promethazine (PHENERGAN) tablet 12.5-25 mg  12.5-25 mg Oral Q4HRS PRN Karolyn Zavala M.D.        promethazine (PHENERGAN) suppository 12.5-25 mg  12.5-25 mg Rectal Q4HRS PRN Kaorlyn Zavala M.D.        prochlorperazine (COMPAZINE) injection 5-10 mg  5-10 mg Intravenous Q4HRS PRN Karolyn Zavala M.D.        dextrose 10 % BOLUS 25 g  25 g Intravenous Q15 MIN PRN Karolyn Zavala M.D.   Stopped at 11/28/22 1222    oxyCODONE immediate-release (ROXICODONE) tablet 5 mg  5 mg Oral Q4HRS PRN Karolyn Zavala M.D.   5 mg at 11/28/22 0944    aspirin EC (ECOTRIN) tablet 81 mg  81 mg Oral DAILY Karolyn Zavala M.D.   81 mg at 11/29/22 0637    atorvastatin (LIPITOR) tablet 80 mg  80 mg Oral Q  EVENING Karolyn Zavala M.D.   80 mg at 11/29/22 1705    carvedilol (COREG) tablet 12.5 mg  12.5 mg Oral BID WITH MEALS Karolyn Zavala M.D.   12.5 mg at 11/29/22 1706    cholestyramine (QUESTRAN,PREVALITE) 4 GM powder 4 g  4 g Oral BID Karolyn Zavala M.D.   4 g at 11/29/22 1809    enalapril (VASOTEC) tablet 20 mg  20 mg Oral BID Karolyn Zavala M.D.   20 mg at 11/29/22 1706    omeprazole (PRILOSEC) capsule 20 mg  20 mg Oral QAM Karolyn Zavala M.D.   20 mg at 11/29/22 0636    potassium chloride SA (Kdur) tablet 20 mEq  20 mEq Oral BID Karolyn Zavala M.D.   20 mEq at 11/29/22 1706       Allergies  No Known Allergies    Physical Exam  Temp:  [36.8 °C (98.3 °F)-37.1 °C (98.8 °F)] 36.8 °C (98.3 °F)  Pulse:  [75-85] 79  Resp:  [11-23] 23  BP: (128-172)/(69-85) 172/84  SpO2:  [89 %-97 %] 97 %    Physical Exam  Constitutional:       General: He is not in acute distress.     Appearance: He is well-developed. He is obese. He is not diaphoretic.   HENT:      Head: Normocephalic and atraumatic.      Mouth/Throat:      Mouth: Mucous membranes are moist.      Pharynx: Oropharynx is clear. No oropharyngeal exudate.   Eyes:      General: No scleral icterus.     Extraocular Movements: Extraocular movements intact.   Neck:      Trachea: No tracheal deviation.   Cardiovascular:      Rate and Rhythm: Normal rate.      Heart sounds: Normal heart sounds. No murmur heard.  Pulmonary:      Effort: Pulmonary effort is normal.      Breath sounds: Normal breath sounds. No stridor. No rales.   Abdominal:      General: There is no distension.      Palpations: Abdomen is soft.      Tenderness: There is no abdominal tenderness.   Musculoskeletal:         General: Swelling (Lower extremity edema extends up to the abdominal wall) present. Normal range of motion.      Right lower leg: Edema (3-4+) present.      Left lower leg: Edema (3-4+) present.   Skin:     General: Skin is warm and dry.    Neurological:      General: No focal deficit present.      Mental Status: He is alert and oriented to person, place, and time.   Psychiatric:         Mood and Affect: Mood normal.         Behavior: Behavior normal.       Fluids  Date 11/29/22 0700 - 11/30/22 0659   Shift 1777-4459 9534-7235 6030-1628 24 Hour Total   INTAKE   I.V. 423.7 176.1  599.8   IV Piggyback 805.5   805.5   Shift Total 1229.2 176.1  1405.3   OUTPUT   Urine 1175 400  1575   Shift Total 1175 400  1575   Weight (kg) 118.5 118.5 118.5 118.5       Laboratory  Labs reviewed, pertinent labs below.  Recent Labs     11/27/22  1106 11/28/22  0334 11/29/22  0348   WBC 11.8* 19.2* 19.3*   RBC 3.47* 3.46* 3.65*   HEMOGLOBIN 10.5* 10.6* 11.1*   HEMATOCRIT 31.3* 31.7* 34.1*   MCV 90.2 91.6 93.4   MCH 30.3 30.6 30.4   MCHC 33.5* 33.4* 32.6*   RDW 43.2 45.1 46.8   PLATELETCT 170 152* 154*   MPV 11.2 11.1 11.6     Recent Labs     11/28/22  0334 11/28/22  1453 11/29/22  0348   SODIUM 139 135 132*   POTASSIUM 4.6 4.3 4.4   CHLORIDE 111 109 107   CO2 21 19* 19*   GLUCOSE 58* 54* 159*   BUN 34* 35* 34*   CREATININE 1.96* 2.14* 2.39*   CALCIUM 7.5* 7.5* 7.5*                URINALYSIS:  Lab Results   Component Value Date/Time    COLORURINE Yellow 11/27/2022 1259    CLARITY Clear 11/27/2022 1259    SPECGRAVITY 1.017 11/27/2022 1259    PHURINE 5.0 11/27/2022 1259    KETONES Negative 11/27/2022 1259    PROTEINURIN >=1000 (A) 11/27/2022 1259    BILIRUBINUR Negative 11/27/2022 1259    UROBILU 0.2 11/27/2022 1259    NITRITE Negative 11/27/2022 1259    LEUKESTERAS Negative 11/27/2022 1259    OCCULTBLOOD Moderate (A) 11/27/2022 1259     [facility]  Lab Results   Component Value Date/Time    TOTPROTUR >600.0 (H) 11/19/2022 1709      Lab Results   Component Value Date/Time    CREATININEU 81.09 11/19/2022 1709       Imaging interpreted by radiologist. Imaging reports reviewed with pertinent findings below  DX-CHEST-PORTABLE (1 VIEW)   Final Result      Hazy opacification in the right  lung is similar to the prior exam and likely represents layering pleural fluid      CT-RENAL COLIC EVALUATION(A/P W/O)   Final Result      1.  No renal or ureteral calculi. No hydronephrosis      2.   Small-to-moderate amount of ascites around the liver and spleen and extending into the pelvis      3.  Partially visualized moderate right pleural effusion with adjacent atelectasis.      4.  Diffuse subcutaneous edema is consistent with anasarca      5.  Cholelithiasis.      6.  Mild diverticulosis.      7.  Calcification pancreatic head is grossly unchanged from the prior CT.            Assessment/Plan  52 y.o. male with history of CKD of undetermined stage, from biopsy-proven diabetic nephropathy and chronic microangiopathic changes, diabetes, hypertension who presented 11/27/2022 with flank pain and lower extremity edema.    1.  AXEL on CKD 3A, versus progression of CKD.  This patient has biopsy-proven diabetic nephropathy, with kidney biopsy showing 50% interstitial fibrosis and tubular atrophy in March 2022.  This suggests that the patient already had advanced CKD.  I believe that his massive anasarca is masking his true degree of CKD.  I believe the patient needs aggressive diuresis as below.  There is no acute need for dialysis.  Avoid nephrotoxins.  Check labs daily.    2.  Acute hypoxic respiratory failure, due to volume overload.  Recommend aggressive diuresis with Lasix 80 mg IV twice daily.  This can be increased to 100 mg IV 3 times daily as needed.    3.  Hypoglycemia, present prior to admission, and during admission.  Likely due to decreased metabolism of insulin from advanced CKD.  Defer diabetes management to primary team.    4.  Hyponatremia, mild.  Limit hypotonic fluids.  Check labs daily.    5.  Metabolic acidosis, due to advanced CKD.  Order sodium bicarbonate 1300 mg p.o. twice daily.    6.  Hypocalcemia, mild, likely corrects for low albumin.  No need for calcium repletion.    7.  Hypertension,  uncontrolled, due to volume overload.  Recommend aggressive diuresis as above.  I would not recommend increasing any other antihypertensive medication while continuing ongoing diuresis.    Discussed with Dr. Tulio Ortiz MD  Nephrology  [facility]

## 2022-11-30 NOTE — PROGRESS NOTES
4 Eyes Skin Assessment Completed by JULIANO Henning and JULIANO Hood.    Head WDL  Ears WDL  Nose WDL  Mouth WDL  Neck WDL  Breast/Chest Redness and Blanching  Shoulder Blades Redness and Blanching  Spine Redness and Blanching  (R) Arm/Elbow/Hand Redness and Blanching  (L) Arm/Elbow/Hand Redness and Blanching  Abdomen Redness and Blanching  Groin Redness and Blanching  Scrotum/Coccyx/Buttocks Redness, Blanching, and Excoriation, scrotal edema  (R) Leg Redness, Blanching, and Edema   (L) Leg Redness, Blanching, and Edema  (R) Heel/Foot/Toe Redness, Blanching, and Edema missing all toes on right foot  (L) Heel/Foot/Toe Redness, Blanching, and Edema missing third toe          Devices In Places Tele Box, Blood Pressure Cuff, and Pulse Ox      Interventions In Place Pillows    Possible Skin Injury No    Pictures Uploaded Into Epic N/A  Wound Consult Placed N/A  RN Wound Prevention Protocol Ordered No

## 2022-11-30 NOTE — PROGRESS NOTES
Bedside report received from night shift RN. Assumed care at 0700. Pt is A&Ox4. Pt is in bed. Pt denies pain at this time. Pt was updated on plan of care. Pt has call light, personal belongings, and bedside table within reach. Bed is in the lowest position. Will continue to monitor

## 2022-11-30 NOTE — HEART FAILURE PROGRAM
Looks like plan is to discharge to home. The below appointments are appropriate as long as patient discharges in the next couple of days.    Dec 07, 2022  8:15 AM   Heart Failure New Patient with Rl Engel M.D.   [facility] (--) [address], [address]   [facility] [address]   [phone]      Dec 13, 2022 11:40 AM   (Arrive by 11:25 AM)   Established Patient with Florinda Pascual M.D.   [facility] [place] [address] [address]   [facility] [id number]   [phone]

## 2022-11-30 NOTE — DISCHARGE PLANNING
Case Management Discharge Planning    Admission Date: 11/27/2022  GMLOS: 3.9  ALOS: 2    6-Clicks ADL Score: 24  6-Clicks Mobility Score: 24      Anticipated Discharge Dispo: Discharge Disposition: Discharged to home/self care (01)    DME Needed: No    Action(s) Taken: Updated Provider/Nurse on Discharge Plan    Escalations Completed: None    Medically Clear: No    Next Steps: Patient was discharged on 11/23 and is a 30 day readmission.  Patient from [facility].  Patient is currently on RA and anticipate patient will go home with no needs when medically ready.      Barriers to Discharge: Medical clearance

## 2022-11-30 NOTE — CARE PLAN
Problem: Knowledge Deficit - Standard  Goal: Patient and family/care givers will demonstrate understanding of plan of care, disease process/condition, diagnostic tests and medications  Outcome: Progressing  Note: Pt's whiteboard is updated, pt has been updated on POC, all questions have been answered at this time       Problem: Pain - Standard  Goal: Alleviation of pain or a reduction in pain to the patient’s comfort goal  Outcome: Progressing  Note: Pain interventions will allow pt to perform ADLs comfortably    The patient is Watcher - Medium risk of patient condition declining or worsening    Shift Goals  Clinical Goals: I&O, ambulate, monitor blood sugars  Patient Goals: ambulate, rest  Family Goals: mima    Progress made toward(s) clinical / shift goals:  diurese, I&Os, ambulate, monitor blood sugars     Patient is not progressing towards the following goals:

## 2022-11-30 NOTE — PROGRESS NOTES
Report given to receiving nurse and patient  transferred to room 801 via wheelchair, all belongings taken with patient

## 2022-12-01 LAB
ALBUMIN SERPL BCP-MCNC: 1.6 G/DL (ref 3.2–4.9)
ALBUMIN/GLOB SERPL: 0.6 G/DL
ALP SERPL-CCNC: 55 U/L (ref 30–99)
ALT SERPL-CCNC: 16 U/L (ref 2–50)
ANION GAP SERPL CALC-SCNC: 9 MMOL/L (ref 7–16)
AST SERPL-CCNC: 19 U/L (ref 12–45)
BASOPHILS # BLD AUTO: 0.5 % (ref 0–1.8)
BASOPHILS # BLD: 0.07 K/UL (ref 0–0.12)
BILIRUB SERPL-MCNC: 1 MG/DL (ref 0.1–1.5)
BUN SERPL-MCNC: 41 MG/DL (ref 8–22)
CALCIUM SERPL-MCNC: 7.5 MG/DL (ref 8.5–10.5)
CHLORIDE SERPL-SCNC: 105 MMOL/L (ref 96–112)
CO2 SERPL-SCNC: 19 MMOL/L (ref 20–33)
CREAT SERPL-MCNC: 2.34 MG/DL (ref 0.5–1.4)
EOSINOPHIL # BLD AUTO: 0.54 K/UL (ref 0–0.51)
EOSINOPHIL NFR BLD: 4 % (ref 0–6.9)
ERYTHROCYTE [DISTWIDTH] IN BLOOD BY AUTOMATED COUNT: 42.7 FL (ref 35.9–50)
ERYTHROCYTE [DISTWIDTH] IN BLOOD BY AUTOMATED COUNT: 42.7 FL (ref 35.9–50)
GFR SERPLBLD CREATININE-BSD FMLA CKD-EPI: 32 ML/MIN/1.73 M 2
GLOBULIN SER CALC-MCNC: 2.6 G/DL (ref 1.9–3.5)
GLUCOSE BLD STRIP.AUTO-MCNC: 140 MG/DL (ref 65–99)
GLUCOSE BLD STRIP.AUTO-MCNC: 180 MG/DL (ref 65–99)
GLUCOSE BLD STRIP.AUTO-MCNC: 188 MG/DL (ref 65–99)
GLUCOSE BLD STRIP.AUTO-MCNC: 212 MG/DL (ref 65–99)
GLUCOSE SERPL-MCNC: 153 MG/DL (ref 65–99)
HCT VFR BLD AUTO: 29.7 % (ref 42–52)
HCT VFR BLD AUTO: 29.7 % (ref 42–52)
HGB BLD-MCNC: 10.1 G/DL (ref 14–18)
HGB BLD-MCNC: 10.1 G/DL (ref 14–18)
IMM GRANULOCYTES # BLD AUTO: 0.05 K/UL (ref 0–0.11)
IMM GRANULOCYTES NFR BLD AUTO: 0.4 % (ref 0–0.9)
LYMPHOCYTES # BLD AUTO: 2.02 K/UL (ref 1–4.8)
LYMPHOCYTES NFR BLD: 14.9 % (ref 22–41)
MAGNESIUM SERPL-MCNC: 1.6 MG/DL (ref 1.5–2.5)
MCH RBC QN AUTO: 30.3 PG (ref 27–33)
MCH RBC QN AUTO: 30.3 PG (ref 27–33)
MCHC RBC AUTO-ENTMCNC: 34 G/DL (ref 33.7–35.3)
MCHC RBC AUTO-ENTMCNC: 34 G/DL (ref 33.7–35.3)
MCV RBC AUTO: 89.2 FL (ref 81.4–97.8)
MCV RBC AUTO: 89.2 FL (ref 81.4–97.8)
MONOCYTES # BLD AUTO: 0.98 K/UL (ref 0–0.85)
MONOCYTES NFR BLD AUTO: 7.2 % (ref 0–13.4)
NEUTROPHILS # BLD AUTO: 9.89 K/UL (ref 1.82–7.42)
NEUTROPHILS NFR BLD: 73 % (ref 44–72)
NRBC # BLD AUTO: 0 K/UL
NRBC BLD-RTO: 0 /100 WBC
PHOSPHATE SERPL-MCNC: 3.5 MG/DL (ref 2.5–4.5)
PLATELET # BLD AUTO: 180 K/UL (ref 164–446)
PLATELET # BLD AUTO: 180 K/UL (ref 164–446)
PMV BLD AUTO: 10.9 FL (ref 9–12.9)
PMV BLD AUTO: 10.9 FL (ref 9–12.9)
POTASSIUM SERPL-SCNC: 4 MMOL/L (ref 3.6–5.5)
PROCALCITONIN SERPL-MCNC: 1.7 NG/ML
PROT SERPL-MCNC: 4.2 G/DL (ref 6–8.2)
RBC # BLD AUTO: 3.33 M/UL (ref 4.7–6.1)
RBC # BLD AUTO: 3.33 M/UL (ref 4.7–6.1)
SODIUM SERPL-SCNC: 133 MMOL/L (ref 135–145)
WBC # BLD AUTO: 13.1 K/UL (ref 4.8–10.8)
WBC # BLD AUTO: 13.1 K/UL (ref 4.8–10.8)

## 2022-12-01 PROCEDURE — A9270 NON-COVERED ITEM OR SERVICE: HCPCS | Performed by: INTERNAL MEDICINE

## 2022-12-01 PROCEDURE — 36415 COLL VENOUS BLD VENIPUNCTURE: CPT

## 2022-12-01 PROCEDURE — 80053 COMPREHEN METABOLIC PANEL: CPT

## 2022-12-01 PROCEDURE — 84100 ASSAY OF PHOSPHORUS: CPT

## 2022-12-01 PROCEDURE — A9270 NON-COVERED ITEM OR SERVICE: HCPCS | Performed by: NURSE PRACTITIONER

## 2022-12-01 PROCEDURE — 99232 SBSQ HOSP IP/OBS MODERATE 35: CPT | Performed by: INTERNAL MEDICINE

## 2022-12-01 PROCEDURE — 83735 ASSAY OF MAGNESIUM: CPT

## 2022-12-01 PROCEDURE — 82962 GLUCOSE BLOOD TEST: CPT

## 2022-12-01 PROCEDURE — 700102 HCHG RX REV CODE 250 W/ 637 OVERRIDE(OP): Performed by: NURSE PRACTITIONER

## 2022-12-01 PROCEDURE — 770020 HCHG ROOM/CARE - TELE (206)

## 2022-12-01 PROCEDURE — 700111 HCHG RX REV CODE 636 W/ 250 OVERRIDE (IP): Performed by: INTERNAL MEDICINE

## 2022-12-01 PROCEDURE — 700102 HCHG RX REV CODE 250 W/ 637 OVERRIDE(OP): Performed by: INTERNAL MEDICINE

## 2022-12-01 PROCEDURE — 99233 SBSQ HOSP IP/OBS HIGH 50: CPT | Performed by: INTERNAL MEDICINE

## 2022-12-01 PROCEDURE — 85025 COMPLETE CBC W/AUTO DIFF WBC: CPT

## 2022-12-01 PROCEDURE — 84145 PROCALCITONIN (PCT): CPT

## 2022-12-01 RX ADMIN — INSULIN HUMAN 6 UNITS: 100 INJECTION, SOLUTION PARENTERAL at 11:51

## 2022-12-01 RX ADMIN — POTASSIUM CHLORIDE 20 MEQ: 1500 TABLET, EXTENDED RELEASE ORAL at 05:40

## 2022-12-01 RX ADMIN — CHOLESTYRAMINE 4 G: 4 POWDER, FOR SUSPENSION ORAL at 10:12

## 2022-12-01 RX ADMIN — ATORVASTATIN CALCIUM 80 MG: 80 TABLET, FILM COATED ORAL at 16:59

## 2022-12-01 RX ADMIN — SODIUM BICARBONATE 1300 MG: 650 TABLET ORAL at 16:59

## 2022-12-01 RX ADMIN — POTASSIUM CHLORIDE 20 MEQ: 1500 TABLET, EXTENDED RELEASE ORAL at 16:59

## 2022-12-01 RX ADMIN — FUROSEMIDE 80 MG: 10 INJECTION, SOLUTION INTRAMUSCULAR; INTRAVENOUS at 16:59

## 2022-12-01 RX ADMIN — CHOLESTYRAMINE 4 G: 4 POWDER, FOR SUSPENSION ORAL at 16:32

## 2022-12-01 RX ADMIN — ENALAPRIL MALEATE 20 MG: 10 TABLET ORAL at 16:59

## 2022-12-01 RX ADMIN — SODIUM BICARBONATE 1300 MG: 650 TABLET ORAL at 05:40

## 2022-12-01 RX ADMIN — INSULIN HUMAN 3 UNITS: 100 INJECTION, SOLUTION PARENTERAL at 20:02

## 2022-12-01 RX ADMIN — THIAMINE HCL TAB 100 MG 100 MG: 100 TAB at 05:40

## 2022-12-01 RX ADMIN — ASPIRIN 81 MG: 81 TABLET, COATED ORAL at 05:40

## 2022-12-01 RX ADMIN — OMEPRAZOLE 20 MG: 20 CAPSULE, DELAYED RELEASE ORAL at 05:40

## 2022-12-01 RX ADMIN — ENALAPRIL MALEATE 20 MG: 10 TABLET ORAL at 05:40

## 2022-12-01 RX ADMIN — CARVEDILOL 12.5 MG: 12.5 TABLET, FILM COATED ORAL at 16:59

## 2022-12-01 RX ADMIN — ENOXAPARIN SODIUM 40 MG: 40 INJECTION SUBCUTANEOUS at 16:59

## 2022-12-01 RX ADMIN — DOCUSATE SODIUM 50 MG AND SENNOSIDES 8.6 MG 2 TABLET: 8.6; 5 TABLET, FILM COATED ORAL at 16:59

## 2022-12-01 RX ADMIN — INSULIN HUMAN 3 UNITS: 100 INJECTION, SOLUTION PARENTERAL at 17:00

## 2022-12-01 RX ADMIN — CARVEDILOL 12.5 MG: 12.5 TABLET, FILM COATED ORAL at 08:03

## 2022-12-01 RX ADMIN — FUROSEMIDE 80 MG: 10 INJECTION, SOLUTION INTRAMUSCULAR; INTRAVENOUS at 05:40

## 2022-12-01 ASSESSMENT — ENCOUNTER SYMPTOMS
FOCAL WEAKNESS: 0
TINGLING: 0
HALLUCINATIONS: 0
DIZZINESS: 0
FEVER: 0
SPUTUM PRODUCTION: 0
BACK PAIN: 0
VOMITING: 0
ORTHOPNEA: 0
HEADACHES: 0
CONSTIPATION: 0
BLURRED VISION: 0
TREMORS: 0
PHOTOPHOBIA: 0
NAUSEA: 0
NECK PAIN: 0
ABDOMINAL PAIN: 0
EYE PAIN: 0
SPEECH CHANGE: 0
CHILLS: 0
COUGH: 0
DOUBLE VISION: 0
MYALGIAS: 0
WEIGHT LOSS: 0
SHORTNESS OF BREATH: 0
PALPITATIONS: 0
SENSORY CHANGE: 0
DIARRHEA: 0

## 2022-12-01 ASSESSMENT — COGNITIVE AND FUNCTIONAL STATUS - GENERAL
SUGGESTED CMS G CODE MODIFIER MOBILITY: CH
SUGGESTED CMS G CODE MODIFIER DAILY ACTIVITY: CH
DAILY ACTIVITIY SCORE: 24
MOBILITY SCORE: 24

## 2022-12-01 ASSESSMENT — LIFESTYLE VARIABLES: SUBSTANCE_ABUSE: 0

## 2022-12-01 ASSESSMENT — PAIN DESCRIPTION - PAIN TYPE: TYPE: ACUTE PAIN

## 2022-12-01 ASSESSMENT — FIBROSIS 4 INDEX: FIB4 SCORE: 1.64

## 2022-12-01 NOTE — PROGRESS NOTES
Bedside report received from day RN, pt care assumed, assessment completed. Pt is A&O4, pain 0/10, SR on the monitor. Updated on POC, questions answered. Bed in lowest, locked position, treaded socks on, call light and belongings within reach.

## 2022-12-01 NOTE — PROGRESS NOTES
Nephrology Daily Progress Note    Date of Service  11/30/2022    Chief Complaint  52 y.o. male with history of CKD of undetermined stage, from biopsy-proven diabetic nephropathy and chronic microangiopathic changes, diabetes, hypertension who presented 11/27/2022 with flank pain and lower extremity edema.    Interval Problem Update  11/30 -patient had 4 L of urine output in the last 24 hours on Lasix 80 mg IV twice daily.  Off of oxygen.  Denies chest pain, shortness of breath, but thinks his legs and stomach are still very swollen.    Review of Systems  Review of Systems   Constitutional:  Negative for fever.   Respiratory:  Negative for shortness of breath.    Cardiovascular:  Positive for leg swelling. Negative for chest pain.   Gastrointestinal:  Negative for abdominal pain.   All other systems reviewed and are negative.     Physical Exam  Temp:  [36.4 °C (97.5 °F)-37.1 °C (98.8 °F)] 36.6 °C (97.9 °F)  Pulse:  [66-79] 68  Resp:  [11-30] 18  BP: (150-172)/(81-89) 164/85  SpO2:  [92 %-97 %] 94 %    Physical Exam  Constitutional:       General: He is not in acute distress.     Appearance: He is well-developed. He is obese. He is not diaphoretic.   HENT:      Head: Normocephalic and atraumatic.      Mouth/Throat:      Mouth: Mucous membranes are moist.      Pharynx: Oropharynx is clear. No oropharyngeal exudate.   Eyes:      General: No scleral icterus.     Extraocular Movements: Extraocular movements intact.   Neck:      Trachea: No tracheal deviation.   Cardiovascular:      Rate and Rhythm: Normal rate.      Heart sounds: Normal heart sounds. No murmur heard.  Pulmonary:      Effort: Pulmonary effort is normal.      Breath sounds: Normal breath sounds. No stridor. No rales.   Abdominal:      General: There is no distension.      Palpations: Abdomen is soft.      Tenderness: There is no abdominal tenderness.   Musculoskeletal:         General: Swelling (Anasarca noted to abdominal wall in addition to lower  extremities) present. Normal range of motion.      Right lower leg: Edema (3-4+) present.      Left lower leg: Edema (3-4+) present.   Skin:     General: Skin is warm and dry.   Neurological:      General: No focal deficit present.      Mental Status: He is alert and oriented to person, place, and time.   Psychiatric:         Mood and Affect: Mood normal.         Behavior: Behavior normal.       Fluids    Intake/Output Summary (Last 24 hours) at 11/30/2022 1646  Last data filed at 11/30/2022 1538  Gross per 24 hour   Intake 616.07 ml   Output 4300 ml   Net -3683.93 ml       Laboratory  Labs reviewed, pertinent labs below.  Recent Labs     11/28/22  0334 11/29/22  0348 11/30/22  0947   WBC 19.2* 19.3* 15.1*   RBC 3.46* 3.65* 3.39*   HEMOGLOBIN 10.6* 11.1* 10.2*   HEMATOCRIT 31.7* 34.1* 30.6*   MCV 91.6 93.4 90.3   MCH 30.6 30.4 30.1   MCHC 33.4* 32.6* 33.3*   RDW 45.1 46.8 43.2   PLATELETCT 152* 154* 163*   MPV 11.1 11.6 11.5     Recent Labs     11/28/22  1453 11/29/22  0348 11/30/22  0947   SODIUM 135 132* 133*   POTASSIUM 4.3 4.4 4.2   CHLORIDE 109 107 106   CO2 19* 19* 21   GLUCOSE 54* 159* 147*   BUN 35* 34* 39*   CREATININE 2.14* 2.39* 2.25*   CALCIUM 7.5* 7.5* 7.6*               URINALYSIS:  Lab Results   Component Value Date/Time    COLORURINE Yellow 11/27/2022 1259    CLARITY Clear 11/27/2022 1259    SPECGRAVITY 1.017 11/27/2022 1259    PHURINE 5.0 11/27/2022 1259    KETONES Negative 11/27/2022 1259    PROTEINURIN >=1000 (A) 11/27/2022 1259    BILIRUBINUR Negative 11/27/2022 1259    UROBILU 0.2 11/27/2022 1259    NITRITE Negative 11/27/2022 1259    LEUKESTERAS Negative 11/27/2022 1259    OCCULTBLOOD Moderate (A) 11/27/2022 1259     UPC  Lab Results   Component Value Date/Time    TOTPROTUR >600.0 (H) 11/19/2022 1709      Lab Results   Component Value Date/Time    CREATININEU 81.09 11/19/2022 1709         Imaging interpreted by radiologist. Imaging reports reviewed with pertinent findings  below  DX-CHEST-LIMITED (1 VIEW)   Final Result      Atelectasis within the right lung base.      DX-CHEST-PORTABLE (1 VIEW)   Final Result      Hazy opacification in the right lung is similar to the prior exam and likely represents layering pleural fluid      CT-RENAL COLIC EVALUATION(A/P W/O)   Final Result      1.  No renal or ureteral calculi. No hydronephrosis      2.   Small-to-moderate amount of ascites around the liver and spleen and extending into the pelvis      3.  Partially visualized moderate right pleural effusion with adjacent atelectasis.      4.  Diffuse subcutaneous edema is consistent with anasarca      5.  Cholelithiasis.      6.  Mild diverticulosis.      7.  Calcification pancreatic head is grossly unchanged from the prior CT.            Current Facility-Administered Medications   Medication Dose Route Frequency Provider Last Rate Last Admin    insulin regular (HumuLIN R,NovoLIN R) injection  3-15 Units Subcutaneous 4X/DAY ACHS Yosef Antunez D.O.   3 Units at 11/30/22 1630    furosemide (LASIX) injection 80 mg  80 mg Intravenous BID DIURETIC Varun Ortiz M.D.   80 mg at 11/30/22 1622    sodium bicarbonate tablet 1,300 mg  1,300 mg Oral BID Varun Ortiz M.D.   1,300 mg at 11/30/22 1622    thiamine (Vitamin B-1) tablet 100 mg  100 mg Oral DAILY Usman Hermosillo A.P.R.N.   100 mg at 11/30/22 0427    Pneumococcal 20-Nilda Conj Vacc (PREVNAR 20) syringe 0.5 mL  0.5 mL Intramuscular Once PRN Abdulaziz Hawkins M.D.        influenza vaccine quad (FLUZONE/FLUARIX) injection 0.5 mL  0.5 mL Intramuscular Once PRN Abdulaziz Hawkins M.D.        HYDROmorphone (Dilaudid) injection 0.5 mg  0.5 mg Intravenous Q3HRS PRN Agustin Zhong M.D.   0.5 mg at 11/29/22 2246    senna-docusate (PERICOLACE or SENOKOT S) 8.6-50 MG per tablet 2 Tablet  2 Tablet Oral BID Karolyn Zavala M.D.   2 Tablet at 11/28/22 1712    And    polyethylene glycol/lytes (MIRALAX) PACKET 1 Packet  1 Packet Oral QDAY PRN  Karolyn Zavala M.D.        And    magnesium hydroxide (MILK OF MAGNESIA) suspension 30 mL  30 mL Oral QDAY PRN Karolyn Zavala M.D.        And    bisacodyl (DULCOLAX) suppository 10 mg  10 mg Rectal QDAY PRN Karolyn Zavala M.D.        enoxaparin (Lovenox) inj 40 mg  40 mg Subcutaneous DAILY AT 1800 Karolyn Zavala M.D.   40 mg at 11/30/22 1622    acetaminophen (Tylenol) tablet 650 mg  650 mg Oral Q6HRS PRN Karolyn Zavala M.D.   650 mg at 11/27/22 1923    ondansetron (ZOFRAN) syringe/vial injection 4 mg  4 mg Intravenous Q4HRS PRN Karolyn Zavala M.D.        ondansetron (ZOFRAN ODT) dispertab 4 mg  4 mg Oral Q4HRS PRN Karolyn Zavala M.D.        promethazine (PHENERGAN) tablet 12.5-25 mg  12.5-25 mg Oral Q4HRS PRN Karolyn Zavala M.D.        promethazine (PHENERGAN) suppository 12.5-25 mg  12.5-25 mg Rectal Q4HRS PRN Karolyn Zavala M.D.        prochlorperazine (COMPAZINE) injection 5-10 mg  5-10 mg Intravenous Q4HRS PRN Karolyn Zavala M.D.        dextrose 10 % BOLUS 25 g  25 g Intravenous Q15 MIN PRN Karolyn Zavala M.D.   Stopped at 11/28/22 1222    oxyCODONE immediate-release (ROXICODONE) tablet 5 mg  5 mg Oral Q4HRS PRN Karolyn Zavala M.D.   5 mg at 11/30/22 0622    aspirin EC (ECOTRIN) tablet 81 mg  81 mg Oral DAILY Karolyn Zavala M.D.   81 mg at 11/30/22 0427    atorvastatin (LIPITOR) tablet 80 mg  80 mg Oral Q EVENING Karolyn Zavala M.D.   80 mg at 11/30/22 1622    carvedilol (COREG) tablet 12.5 mg  12.5 mg Oral BID WITH MEALS Karolyn Zavala M.D.   12.5 mg at 11/30/22 1622    cholestyramine (QUESTRAN,PREVALITE) 4 GM powder 4 g  4 g Oral BID Karolyn Zavala M.D.   4 g at 11/30/22 1101    enalapril (VASOTEC) tablet 20 mg  20 mg Oral BID Karolyn Zavala M.D.   20 mg at 11/30/22 1622    omeprazole (PRILOSEC) capsule 20 mg  20 mg Oral QAM Karolyn Zavala M.D.   20 mg at 11/30/22 0423     potassium chloride SA (Kdur) tablet 20 mEq  20 mEq Oral BID Karolyn Zavala M.D.   20 mEq at 11/30/22 1622         Assessment/Plan  52 y.o. male with history of CKD of undetermined stage, from biopsy-proven diabetic nephropathy and chronic microangiopathic changes, diabetes, hypertension who presented 11/27/2022 with flank pain and lower extremity edema.     1.  AXEL on CKD 3A, versus progression of CKD.  Nonoliguric, kidney function stable.  This patient has biopsy-proven diabetic nephropathy, with kidney biopsy showing 50% interstitial fibrosis and tubular atrophy in March 2022.  This suggests that the patient already had advanced CKD.  I believe his massive anasarca is masking his true degree of CKD.  Continue aggressive diuresis as below.  No acute need for dialysis.  Avoid nephrotoxins.  Check labs daily.    2.  Acute hypoxic respiratory failure, due to volume overload.  Much improved with diuresis.  Continue Lasix 80 mg IV twice daily.    3.  Hypoglycemia, present prior to admission, improved during this admission.  Likely due to decreased metabolism of sulfonylureas and insulin from advanced CKD.  Defer diabetes management to primary team.    4.  Hyponatremia, mild.  Limit hypotonic fluids.  Check labs daily.    5.  Metabolic acidosis, controlled, continue sodium bicarbonate 1300 mg p.o. twice daily.    6.  Hypocalcemia, mild, likely corrects for low albumin.  No need for calcium repletion.    7.  Hypertension, uncontrolled, due to volume overload.  Recommend aggressive diuresis as above.  I do not recommend increasing any other antihypertensive medications while continuing ongoing active diuresis.         Varun Ortiz MD  Nephrology  [facility]

## 2022-12-01 NOTE — PROGRESS NOTES
Monitor Summary:   Rhythm: sr  Rate: 67-73  Measurement: .15/.10/.39  Ectopy: r pvc

## 2022-12-01 NOTE — CARE PLAN
Problem: Psychosocial  Goal: Patient's level of anxiety will decrease  Outcome: Progressing     Problem: Communication  Goal: The ability to communicate needs accurately and effectively will improve  Outcome: Progressing     Problem: Respiratory  Goal: Patient will achieve/maintain optimum respiratory ventilation and gas exchange  Outcome: Progressing     Problem: Fluid Volume  Goal: Fluid volume balance will be maintained  Outcome: Progressing  Note: Pt tolerating diuresis well, continue to monitor I/O   The patient is Watcher - Medium risk of patient condition declining or worsening    Shift Goals  Clinical Goals: diuresis, I/O, pain management  Patient Goals: rest  Family Goals: mima    Progress made toward(s) clinical / shift goals:  Pt tolerating diuresis well.     Patient is not progressing towards the following goals:

## 2022-12-01 NOTE — CARE PLAN
Problem: Knowledge Deficit - Standard  Goal: Patient and family/care givers will demonstrate understanding of plan of care, disease process/condition, diagnostic tests and medications  Outcome: Progressing     Problem: Pain - Standard  Goal: Alleviation of pain or a reduction in pain to the patient’s comfort goal  Outcome: Progressing     Problem: Psychosocial  Goal: Patient's level of anxiety will decrease  Outcome: Progressing  Goal: Patient's ability to verbalize feelings about condition will improve  Outcome: Progressing  Goal: Patient's ability to re-evaluate and adapt role responsibilities will improve  Outcome: Progressing  Goal: Patient and family will demonstrate ability to cope with life altering diagnosis and/or procedure  Outcome: Progressing  Goal: Spiritual and cultural needs incorporated into hospitalization  Outcome: Progressing     Problem: Communication  Goal: The ability to communicate needs accurately and effectively will improve  Outcome: Progressing     Problem: Respiratory  Goal: Patient will achieve/maintain optimum respiratory ventilation and gas exchange  Outcome: Progressing     Problem: Fluid Volume  Goal: Fluid volume balance will be maintained  Outcome: Progressing     Problem: Nutrition  Goal: Patient's nutritional and fluid intake will be adequate or improve  Outcome: Progressing  Goal: Enteral nutrition will be maintained or improve  Outcome: Progressing  Goal: Enteral nutrition will be maintained or improve  Outcome: Progressing     Problem: Urinary Elimination  Goal: Establish and maintain regular urinary output  Outcome: Progressing     Problem: Bowel Elimination  Goal: Establish and maintain regular bowel function  Outcome: Progressing     Problem: Mobility  Goal: Patient's capacity to carry out activities will improve  Outcome: Progressing     Problem: Self Care  Goal: Patient will have the ability to perform ADLs independently or with assistance (bathe, groom, dress, toilet  and feed)  Outcome: Progressing     Problem: Infection - Standard  Goal: Patient will remain free from infection  Outcome: Progressing   The patient is Stable - Low risk of patient condition declining or worsening    Shift Goals  Clinical Goals: vive  Patient Goals: sleep  Family Goals: mima    Progress made toward(s) clinical / shift goals:  yes    Patient is not progressing towards the following goals:

## 2022-12-02 LAB
ALBUMIN SERPL BCP-MCNC: 1.6 G/DL (ref 3.2–4.9)
ALBUMIN/GLOB SERPL: 0.6 G/DL
ALP SERPL-CCNC: 62 U/L (ref 30–99)
ALT SERPL-CCNC: 13 U/L (ref 2–50)
ANION GAP SERPL CALC-SCNC: 9 MMOL/L (ref 7–16)
AST SERPL-CCNC: 19 U/L (ref 12–45)
BASOPHILS # BLD AUTO: 0.4 % (ref 0–1.8)
BASOPHILS # BLD: 0.05 K/UL (ref 0–0.12)
BILIRUB SERPL-MCNC: 1.1 MG/DL (ref 0.1–1.5)
BUN SERPL-MCNC: 38 MG/DL (ref 8–22)
CALCIUM SERPL-MCNC: 7.6 MG/DL (ref 8.5–10.5)
CHLORIDE SERPL-SCNC: 103 MMOL/L (ref 96–112)
CO2 SERPL-SCNC: 22 MMOL/L (ref 20–33)
CREAT SERPL-MCNC: 2.16 MG/DL (ref 0.5–1.4)
EOSINOPHIL # BLD AUTO: 0.35 K/UL (ref 0–0.51)
EOSINOPHIL NFR BLD: 3 % (ref 0–6.9)
ERYTHROCYTE [DISTWIDTH] IN BLOOD BY AUTOMATED COUNT: 40.9 FL (ref 35.9–50)
GFR SERPLBLD CREATININE-BSD FMLA CKD-EPI: 35 ML/MIN/1.73 M 2
GLOBULIN SER CALC-MCNC: 2.8 G/DL (ref 1.9–3.5)
GLUCOSE BLD STRIP.AUTO-MCNC: 152 MG/DL (ref 65–99)
GLUCOSE BLD STRIP.AUTO-MCNC: 182 MG/DL (ref 65–99)
GLUCOSE BLD STRIP.AUTO-MCNC: 201 MG/DL (ref 65–99)
GLUCOSE BLD STRIP.AUTO-MCNC: 215 MG/DL (ref 65–99)
GLUCOSE SERPL-MCNC: 170 MG/DL (ref 65–99)
HCT VFR BLD AUTO: 29.4 % (ref 42–52)
HGB BLD-MCNC: 10.2 G/DL (ref 14–18)
IMM GRANULOCYTES # BLD AUTO: 0.05 K/UL (ref 0–0.11)
IMM GRANULOCYTES NFR BLD AUTO: 0.4 % (ref 0–0.9)
LYMPHOCYTES # BLD AUTO: 1.91 K/UL (ref 1–4.8)
LYMPHOCYTES NFR BLD: 16.2 % (ref 22–41)
MAGNESIUM SERPL-MCNC: 1.5 MG/DL (ref 1.5–2.5)
MCH RBC QN AUTO: 30.3 PG (ref 27–33)
MCHC RBC AUTO-ENTMCNC: 34.7 G/DL (ref 33.7–35.3)
MCV RBC AUTO: 87.2 FL (ref 81.4–97.8)
MONOCYTES # BLD AUTO: 0.97 K/UL (ref 0–0.85)
MONOCYTES NFR BLD AUTO: 8.2 % (ref 0–13.4)
NEUTROPHILS # BLD AUTO: 8.47 K/UL (ref 1.82–7.42)
NEUTROPHILS NFR BLD: 71.8 % (ref 44–72)
NRBC # BLD AUTO: 0 K/UL
NRBC BLD-RTO: 0 /100 WBC
PHOSPHATE SERPL-MCNC: 3.2 MG/DL (ref 2.5–4.5)
PLATELET # BLD AUTO: 174 K/UL (ref 164–446)
PMV BLD AUTO: 10.8 FL (ref 9–12.9)
POTASSIUM SERPL-SCNC: 3.7 MMOL/L (ref 3.6–5.5)
PROT SERPL-MCNC: 4.4 G/DL (ref 6–8.2)
RBC # BLD AUTO: 3.37 M/UL (ref 4.7–6.1)
SODIUM SERPL-SCNC: 134 MMOL/L (ref 135–145)
WBC # BLD AUTO: 11.8 K/UL (ref 4.8–10.8)

## 2022-12-02 PROCEDURE — 99232 SBSQ HOSP IP/OBS MODERATE 35: CPT | Performed by: STUDENT IN AN ORGANIZED HEALTH CARE EDUCATION/TRAINING PROGRAM

## 2022-12-02 PROCEDURE — 83735 ASSAY OF MAGNESIUM: CPT

## 2022-12-02 PROCEDURE — 84100 ASSAY OF PHOSPHORUS: CPT

## 2022-12-02 PROCEDURE — 80053 COMPREHEN METABOLIC PANEL: CPT

## 2022-12-02 PROCEDURE — 700111 HCHG RX REV CODE 636 W/ 250 OVERRIDE (IP): Performed by: INTERNAL MEDICINE

## 2022-12-02 PROCEDURE — A9270 NON-COVERED ITEM OR SERVICE: HCPCS | Performed by: NURSE PRACTITIONER

## 2022-12-02 PROCEDURE — 85025 COMPLETE CBC W/AUTO DIFF WBC: CPT

## 2022-12-02 PROCEDURE — 700102 HCHG RX REV CODE 250 W/ 637 OVERRIDE(OP): Performed by: INTERNAL MEDICINE

## 2022-12-02 PROCEDURE — 770020 HCHG ROOM/CARE - TELE (206)

## 2022-12-02 PROCEDURE — 36415 COLL VENOUS BLD VENIPUNCTURE: CPT

## 2022-12-02 PROCEDURE — A9270 NON-COVERED ITEM OR SERVICE: HCPCS | Performed by: INTERNAL MEDICINE

## 2022-12-02 PROCEDURE — 99233 SBSQ HOSP IP/OBS HIGH 50: CPT | Performed by: INTERNAL MEDICINE

## 2022-12-02 PROCEDURE — 700102 HCHG RX REV CODE 250 W/ 637 OVERRIDE(OP): Performed by: NURSE PRACTITIONER

## 2022-12-02 PROCEDURE — 82962 GLUCOSE BLOOD TEST: CPT

## 2022-12-02 RX ADMIN — INSULIN HUMAN 6 UNITS: 100 INJECTION, SOLUTION PARENTERAL at 16:33

## 2022-12-02 RX ADMIN — OMEPRAZOLE 20 MG: 20 CAPSULE, DELAYED RELEASE ORAL at 05:59

## 2022-12-02 RX ADMIN — ATORVASTATIN CALCIUM 80 MG: 80 TABLET, FILM COATED ORAL at 16:38

## 2022-12-02 RX ADMIN — CHOLESTYRAMINE 4 G: 4 POWDER, FOR SUSPENSION ORAL at 10:34

## 2022-12-02 RX ADMIN — FUROSEMIDE 80 MG: 10 INJECTION, SOLUTION INTRAMUSCULAR; INTRAVENOUS at 16:39

## 2022-12-02 RX ADMIN — ENALAPRIL MALEATE 20 MG: 10 TABLET ORAL at 05:58

## 2022-12-02 RX ADMIN — SODIUM BICARBONATE 1300 MG: 650 TABLET ORAL at 05:58

## 2022-12-02 RX ADMIN — INSULIN HUMAN 3 UNITS: 100 INJECTION, SOLUTION PARENTERAL at 06:05

## 2022-12-02 RX ADMIN — SODIUM BICARBONATE 1300 MG: 650 TABLET ORAL at 16:38

## 2022-12-02 RX ADMIN — INSULIN HUMAN 3 UNITS: 100 INJECTION, SOLUTION PARENTERAL at 11:50

## 2022-12-02 RX ADMIN — CARVEDILOL 12.5 MG: 12.5 TABLET, FILM COATED ORAL at 16:38

## 2022-12-02 RX ADMIN — ENOXAPARIN SODIUM 40 MG: 40 INJECTION SUBCUTANEOUS at 16:38

## 2022-12-02 RX ADMIN — CARVEDILOL 12.5 MG: 12.5 TABLET, FILM COATED ORAL at 08:10

## 2022-12-02 RX ADMIN — POTASSIUM CHLORIDE 20 MEQ: 1500 TABLET, EXTENDED RELEASE ORAL at 16:38

## 2022-12-02 RX ADMIN — ASPIRIN 81 MG: 81 TABLET, COATED ORAL at 05:59

## 2022-12-02 RX ADMIN — INSULIN HUMAN 6 UNITS: 100 INJECTION, SOLUTION PARENTERAL at 20:49

## 2022-12-02 RX ADMIN — ACETAMINOPHEN 650 MG: 325 TABLET, FILM COATED ORAL at 09:22

## 2022-12-02 RX ADMIN — POTASSIUM CHLORIDE 20 MEQ: 1500 TABLET, EXTENDED RELEASE ORAL at 05:59

## 2022-12-02 RX ADMIN — ENALAPRIL MALEATE 20 MG: 10 TABLET ORAL at 16:39

## 2022-12-02 RX ADMIN — FUROSEMIDE 80 MG: 10 INJECTION, SOLUTION INTRAMUSCULAR; INTRAVENOUS at 05:59

## 2022-12-02 RX ADMIN — THIAMINE HCL TAB 100 MG 100 MG: 100 TAB at 05:59

## 2022-12-02 RX ADMIN — CHOLESTYRAMINE 4 G: 4 POWDER, FOR SUSPENSION ORAL at 18:18

## 2022-12-02 ASSESSMENT — ENCOUNTER SYMPTOMS
NECK PAIN: 0
BACK PAIN: 0
SENSORY CHANGE: 0
HEADACHES: 0
MYALGIAS: 0
SPUTUM PRODUCTION: 0
TREMORS: 0
VOMITING: 0
DIZZINESS: 0
CONSTIPATION: 0
WEIGHT LOSS: 0
FEVER: 0
DOUBLE VISION: 0
TINGLING: 0
EYE PAIN: 0
PHOTOPHOBIA: 0
SHORTNESS OF BREATH: 0
FOCAL WEAKNESS: 0
ORTHOPNEA: 0
SPEECH CHANGE: 0
CHILLS: 0
DIARRHEA: 0
NAUSEA: 0
HALLUCINATIONS: 0
PALPITATIONS: 0
ABDOMINAL PAIN: 0
COUGH: 0
BLURRED VISION: 0

## 2022-12-02 ASSESSMENT — PAIN DESCRIPTION - PAIN TYPE
TYPE: ACUTE PAIN
TYPE: ACUTE PAIN

## 2022-12-02 ASSESSMENT — FIBROSIS 4 INDEX: FIB4 SCORE: 1.64

## 2022-12-02 ASSESSMENT — LIFESTYLE VARIABLES: SUBSTANCE_ABUSE: 0

## 2022-12-02 NOTE — PROGRESS NOTES
Nephrology Daily Progress Note    Date of Service  12/2/2022    Chief Complaint  52 y.o. male with history of CKD of undetermined stage, from biopsy-proven diabetic nephropathy and chronic microangiopathic changes, diabetes, hypertension who presented 11/27/2022 with flank pain and lower extremity edema.    Interval Problem Update  11/30 -patient had 4 L of urine output in the last 24 hours on Lasix 80 mg IV twice daily.  Off of oxygen.  Denies chest pain, shortness of breath, but thinks his legs and stomach are still very swollen.  12/1 -urine output 7 L in the last 24 hours!  Patient denies chest pain, shortness of breath.  His abdominal/flank pain is gone, but he still has leg and abdominal swelling.  12/2 -urine output 6.7 L in the last 24 hours!  Patient denies chest pain, shortness of breath.  Still has leg and abdominal wall edema.    Review of Systems  Review of Systems   Constitutional:  Negative for fever.   Respiratory:  Negative for shortness of breath.    Cardiovascular:  Positive for leg swelling. Negative for chest pain.   Gastrointestinal:  Negative for abdominal pain.   All other systems reviewed and are negative.     Physical Exam  Temp:  [36.7 °C (98.1 °F)-37.1 °C (98.8 °F)] 36.8 °C (98.2 °F)  Pulse:  [66-68] 66  Resp:  [16-18] 18  BP: (142-179)/(83-94) 179/88  SpO2:  [94 %-97 %] 96 %    Vitals:    12/02/22 0553 12/02/22 0838 12/02/22 0923 12/02/22 1132   BP: (!) 156/92 (!) 176/90 (!) 142/83 (!) 179/88   Weight:       Height:             Physical Exam  Constitutional:       General: He is not in acute distress.     Appearance: He is well-developed. He is not diaphoretic.   HENT:      Head: Normocephalic and atraumatic.      Mouth/Throat:      Mouth: Mucous membranes are moist.      Pharynx: Oropharynx is clear. No oropharyngeal exudate.   Eyes:      General: No scleral icterus.     Extraocular Movements: Extraocular movements intact.   Neck:      Trachea: No tracheal deviation.   Cardiovascular:       Rate and Rhythm: Normal rate.      Heart sounds: Normal heart sounds. No murmur heard.  Pulmonary:      Effort: Pulmonary effort is normal.      Breath sounds: Normal breath sounds. No stridor. No rales.   Abdominal:      General: There is no distension.      Palpations: Abdomen is soft.      Tenderness: There is no abdominal tenderness.   Musculoskeletal:         General: Swelling (Abdominal wall swelling noted) present. Normal range of motion.      Right lower leg: Edema (3-4+) present.      Left lower leg: Edema (3-4+) present.   Skin:     General: Skin is warm and dry.   Neurological:      General: No focal deficit present.      Mental Status: He is alert and oriented to person, place, and time.   Psychiatric:         Mood and Affect: Mood normal.         Behavior: Behavior normal.       Fluids    Intake/Output Summary (Last 24 hours) at 12/2/2022 1433  Last data filed at 12/2/2022 0917  Gross per 24 hour   Intake --   Output 6550 ml   Net -6550 ml         Laboratory  Labs reviewed, pertinent labs below.  Recent Labs     11/30/22  0947 12/01/22  0018 12/02/22  0009   WBC 15.1* 13.1*  13.1* 11.8*   RBC 3.39* 3.33*  3.33* 3.37*   HEMOGLOBIN 10.2* 10.1*  10.1* 10.2*   HEMATOCRIT 30.6* 29.7*  29.7* 29.4*   MCV 90.3 89.2  89.2 87.2   MCH 30.1 30.3  30.3 30.3   MCHC 33.3* 34.0  34.0 34.7   RDW 43.2 42.7  42.7 40.9   PLATELETCT 163* 180  180 174   MPV 11.5 10.9  10.9 10.8       Recent Labs     11/30/22  0947 12/01/22  0018 12/02/22  0009   SODIUM 133* 133* 134*   POTASSIUM 4.2 4.0 3.7   CHLORIDE 106 105 103   CO2 21 19* 22   GLUCOSE 147* 153* 170*   BUN 39* 41* 38*   CREATININE 2.25* 2.34* 2.16*   CALCIUM 7.6* 7.5* 7.6*                 URINALYSIS:  Lab Results   Component Value Date/Time    COLORURINE Yellow 11/27/2022 1259    CLARITY Clear 11/27/2022 1259    SPECGRAVITY 1.017 11/27/2022 1259    PHURINE 5.0 11/27/2022 1259    KETONES Negative 11/27/2022 1259    PROTEINURIN >=1000 (A) 11/27/2022 1259     BILIRUBINUR Negative 11/27/2022 1259    UROBILU 0.2 11/27/2022 1259    NITRITE Negative 11/27/2022 1259    LEUKESTERAS Negative 11/27/2022 1259    OCCULTBLOOD Moderate (A) 11/27/2022 1259     UPC  Lab Results   Component Value Date/Time    TOTPROTUR >600.0 (H) 11/19/2022 1709      Lab Results   Component Value Date/Time    CREATININEU 81.09 11/19/2022 1709         Imaging interpreted by radiologist. Imaging reports reviewed with pertinent findings below  DX-CHEST-LIMITED (1 VIEW)   Final Result      Atelectasis within the right lung base.      DX-CHEST-PORTABLE (1 VIEW)   Final Result      Hazy opacification in the right lung is similar to the prior exam and likely represents layering pleural fluid      CT-RENAL COLIC EVALUATION(A/P W/O)   Final Result      1.  No renal or ureteral calculi. No hydronephrosis      2.   Small-to-moderate amount of ascites around the liver and spleen and extending into the pelvis      3.  Partially visualized moderate right pleural effusion with adjacent atelectasis.      4.  Diffuse subcutaneous edema is consistent with anasarca      5.  Cholelithiasis.      6.  Mild diverticulosis.      7.  Calcification pancreatic head is grossly unchanged from the prior CT.            Current Facility-Administered Medications   Medication Dose Route Frequency Provider Last Rate Last Admin    insulin regular (HumuLIN R,NovoLIN R) injection  3-15 Units Subcutaneous 4X/DAY JOHN Antunez D.O.   3 Units at 12/02/22 1150    furosemide (LASIX) injection 80 mg  80 mg Intravenous BID DIURETIC Varun Ortiz M.D.   80 mg at 12/02/22 0559    sodium bicarbonate tablet 1,300 mg  1,300 mg Oral BID Varun Ortiz M.D.   1,300 mg at 12/02/22 0558    thiamine (Vitamin B-1) tablet 100 mg  100 mg Oral DAILY Usman Hermosillo, A.P.R.N.   100 mg at 12/02/22 0559    Pneumococcal 20-Nilda Conj Vacc (PREVNAR 20) syringe 0.5 mL  0.5 mL Intramuscular Once PRN Abdulaziz Hawkins M.D.        influenza vaccine quad  (FLUZONE/FLUARIX) injection 0.5 mL  0.5 mL Intramuscular Once PRN Abdulaziz Hawkins M.D.        HYDROmorphone (Dilaudid) injection 0.5 mg  0.5 mg Intravenous Q3HRS PRN Agustin Zhong M.D.   0.5 mg at 11/29/22 2246    senna-docusate (PERICOLACE or SENOKOT S) 8.6-50 MG per tablet 2 Tablet  2 Tablet Oral BID Karolyn Zavala M.D.   2 Tablet at 12/01/22 1659    And    polyethylene glycol/lytes (MIRALAX) PACKET 1 Packet  1 Packet Oral QDAY PRN Karolyn Zavala M.D.        And    magnesium hydroxide (MILK OF MAGNESIA) suspension 30 mL  30 mL Oral QDAY PRN Karolyn Zavala M.D.        And    bisacodyl (DULCOLAX) suppository 10 mg  10 mg Rectal QDAY PRN Karolyn Zavala M.D.        enoxaparin (Lovenox) inj 40 mg  40 mg Subcutaneous DAILY AT 1800 Karolyn Zavala M.D.   40 mg at 12/01/22 1659    acetaminophen (Tylenol) tablet 650 mg  650 mg Oral Q6HRS PRN Karolyn Zvaala M.D.   650 mg at 12/02/22 0922    ondansetron (ZOFRAN) syringe/vial injection 4 mg  4 mg Intravenous Q4HRS PRN Karolyn Zavala M.D.        ondansetron (ZOFRAN ODT) dispertab 4 mg  4 mg Oral Q4HRS PRN Karolyn Zavala M.D.        promethazine (PHENERGAN) tablet 12.5-25 mg  12.5-25 mg Oral Q4HRS PRN Karolyn Zavala M.D.        promethazine (PHENERGAN) suppository 12.5-25 mg  12.5-25 mg Rectal Q4HRS PRN Karolyn Zavala M.D.        prochlorperazine (COMPAZINE) injection 5-10 mg  5-10 mg Intravenous Q4HRS PRN Karolyn Zavala M.D.        dextrose 10 % BOLUS 25 g  25 g Intravenous Q15 MIN PRN Karolyn Zavala M.D.   Stopped at 11/28/22 1222    oxyCODONE immediate-release (ROXICODONE) tablet 5 mg  5 mg Oral Q4HRS PRN Karolyn Zavala M.D.   5 mg at 11/30/22 0622    aspirin EC (ECOTRIN) tablet 81 mg  81 mg Oral DAILY Karolyn Zavala M.D.   81 mg at 12/02/22 0559    atorvastatin (LIPITOR) tablet 80 mg  80 mg Oral Q EVENING Karolyn Zavala M.D.   80 mg at 12/01/22 4418     carvedilol (COREG) tablet 12.5 mg  12.5 mg Oral BID WITH MEALS Karolyn Zavala M.D.   12.5 mg at 12/02/22 0810    cholestyramine (QUESTRAN,PREVALITE) 4 GM powder 4 g  4 g Oral BID Karolyn Zavala M.D.   4 g at 12/02/22 1034    enalapril (VASOTEC) tablet 20 mg  20 mg Oral BID Karolyn Zavala M.D.   20 mg at 12/02/22 0558    omeprazole (PRILOSEC) capsule 20 mg  20 mg Oral QAM Karolyn Zavala M.D.   20 mg at 12/02/22 0559    potassium chloride SA (Kdur) tablet 20 mEq  20 mEq Oral BID Karolyn Zavala M.D.   20 mEq at 12/02/22 0559         Assessment/Plan  52 y.o. male with history of CKD of undetermined stage, from biopsy-proven diabetic nephropathy and chronic microangiopathic changes, diabetes, hypertension who presented 11/27/2022 with flank pain and lower extremity edema.     1.  AXEL on CKD 3A, versus progression of CKD.  Nonoliguric, kidney function stable. This patient has biopsy-proven diabetic nephropathy, with kidney biopsy showing 50% interstitial fibrosis and tubular atrophy in March 2022.  This suggests that the patient already had advanced CKD.  I believe his anasarca is masking his true degree of CKD, and I expect his creatinine will worsen with ongoing diuresis.  There is no acute need for dialysis.  Avoid nephrotoxins.  Check labs daily.    2.  Hypertension, uncontrolled, due to ongoing volume overload.  I recommend continuing Lasix 80 mg IV twice daily.  I do not recommend increasing any other antihypertensive medications while continuing ongoing active diuresis.  Low-salt diet.    3.  Hypoglycemia, present prior to admission, improved during this admission.  Likely due to decreased metabolism of sulfonylureas and insulin from advanced CKD.  Stable.  Defer diabetes management to primary team.    4.  Hyponatremia, mildly persistent, mostly pseudohyponatremia from hyperglycemia.  Control sugars.  Limit hypotonic fluids.  Check labs daily.    5.  Metabolic acidosis,  improved.  Continue sodium bicarbonate 1300 mg p.o. twice daily.  Check labs daily.    6.  Hypocalcemia, mild, likely corrects for low albumin.  No need for calcium repletion.  Check labs daily.      Varun Ortiz MD  Nephrology  [facility]

## 2022-12-02 NOTE — PROGRESS NOTES
Hospital Medicine Daily Progress Note    Date of Service  12/1/2022    Chief Complaint  Beni Moore is a 52 y.o. male admitted 11/27/2022 with   Chief Complaint   Patient presents with    Abdominal Pain    Flank Pain     Pt BIB EMS, report that pt has bilat flank/Abd pain. Pt reports recurrent complaint, hx of Kidney stones.            Hospital Course  This is  a 52-year-old male with a past medical history significant for type 2 diabetes mellitus with glyco of 5.5 , hyperlipidemia, CKD, hypertension presented to ER on 11/27/2022 with a complaint of abdominal pain and flank pain.    CT renal concerning for right pleural effusion, noted to have severe hypoglycemia with a blood glucose of 31.  During the stay in the hospital, nephrology was consulted, nephrology started the patient on IV Lasix 80 mg bid    Interval events:    12/01/22    I reviewed and examined him at the bedside.  He expressed that he is feeling significantly better.  He reported that he has been urinating more frequently.  He has generalized edema is going down.  I personally discussed case with nephrologist at the bedside  Discussed plan of care with bedside RN #continue IV diuretics   Continue daily weights.      Plan of care has been discussed the patient, nursing staff, case management.      Consultants/Specialty  nephrology    Code Status  Full Code    Disposition  Patient is not medically cleared for discharge.   Anticipate discharge to to home with organized home healthcare and close outpatient follow-up.  I have placed the appropriate orders for post-discharge needs.    Review of Systems  Review of Systems   Constitutional:  Positive for malaise/fatigue. Negative for chills, fever and weight loss.   HENT:  Negative for hearing loss and tinnitus.    Eyes:  Negative for blurred vision, double vision, photophobia and pain.   Respiratory:  Negative for cough, sputum production and shortness of breath.    Cardiovascular:  Positive for leg  swelling (Significantly improved). Negative for chest pain, palpitations and orthopnea.   Gastrointestinal:  Negative for abdominal pain, constipation, diarrhea, nausea and vomiting.   Genitourinary:  Negative for dysuria, frequency and urgency.   Musculoskeletal:  Negative for back pain, joint pain, myalgias and neck pain.   Skin:  Negative for rash.   Neurological:  Negative for dizziness, tingling, tremors, sensory change, speech change, focal weakness and headaches.   Psychiatric/Behavioral:  Negative for hallucinations and substance abuse.    All other systems reviewed and are negative.     Physical Exam  Temp:  [36.7 °C (98.1 °F)-37.2 °C (99 °F)] 37.1 °C (98.8 °F)  Pulse:  [67-75] 68  Resp:  [18] 18  BP: (139-178)/(78-94) 178/94  SpO2:  [93 %-97 %] 96 %    Physical Exam  Vitals reviewed.   Constitutional:       General: He is not in acute distress.     Appearance: He is obese.   HENT:      Head: Normocephalic and atraumatic. No contusion.      Right Ear: External ear normal.      Left Ear: External ear normal.      Nose: Nose normal.      Mouth/Throat:      Mouth: Mucous membranes are dry.      Pharynx: No oropharyngeal exudate.   Eyes:      General:         Right eye: No discharge.         Left eye: No discharge.      Pupils: Pupils are equal, round, and reactive to light.   Cardiovascular:      Rate and Rhythm: Normal rate and regular rhythm.      Heart sounds: No murmur heard.    No friction rub. No gallop.   Pulmonary:      Effort: Pulmonary effort is normal.      Breath sounds: No wheezing or rhonchi.   Abdominal:      General: Bowel sounds are normal. There is no distension.      Palpations: Abdomen is soft.      Tenderness: There is no abdominal tenderness. There is no rebound.   Musculoskeletal:         General: No swelling or tenderness. Normal range of motion.      Cervical back: No rigidity. No muscular tenderness.      Right lower leg: Edema present.      Left lower leg: Edema present.   Skin:      General: Skin is warm and dry.      Coloration: Skin is not jaundiced.   Neurological:      General: No focal deficit present.      Mental Status: He is alert.      Cranial Nerves: No cranial nerve deficit.      Sensory: No sensory deficit.   Psychiatric:         Mood and Affect: Mood normal.       Fluids    Intake/Output Summary (Last 24 hours) at 12/1/2022 1643  Last data filed at 12/1/2022 1200  Gross per 24 hour   Intake 360 ml   Output 6850 ml   Net -6490 ml         Laboratory  Recent Labs     11/29/22 0348 11/30/22  0947 12/01/22  0018   WBC 19.3* 15.1* 13.1*  13.1*   RBC 3.65* 3.39* 3.33*  3.33*   HEMOGLOBIN 11.1* 10.2* 10.1*  10.1*   HEMATOCRIT 34.1* 30.6* 29.7*  29.7*   MCV 93.4 90.3 89.2  89.2   MCH 30.4 30.1 30.3  30.3   MCHC 32.6* 33.3* 34.0  34.0   RDW 46.8 43.2 42.7  42.7   PLATELETCT 154* 163* 180  180   MPV 11.6 11.5 10.9  10.9       Recent Labs     11/29/22 0348 11/30/22  0947 12/01/22  0018   SODIUM 132* 133* 133*   POTASSIUM 4.4 4.2 4.0   CHLORIDE 107 106 105   CO2 19* 21 19*   GLUCOSE 159* 147* 153*   BUN 34* 39* 41*   CREATININE 2.39* 2.25* 2.34*   CALCIUM 7.5* 7.6* 7.5*                     Imaging  DX-CHEST-LIMITED (1 VIEW)   Final Result      Atelectasis within the right lung base.      DX-CHEST-PORTABLE (1 VIEW)   Final Result      Hazy opacification in the right lung is similar to the prior exam and likely represents layering pleural fluid      CT-RENAL COLIC EVALUATION(A/P W/O)   Final Result      1.  No renal or ureteral calculi. No hydronephrosis      2.   Small-to-moderate amount of ascites around the liver and spleen and extending into the pelvis      3.  Partially visualized moderate right pleural effusion with adjacent atelectasis.      4.  Diffuse subcutaneous edema is consistent with anasarca      5.  Cholelithiasis.      6.  Mild diverticulosis.      7.  Calcification pancreatic head is grossly unchanged from the prior CT.             Assessment/Plan  * Fluid overload-  (present on admission)  Assessment & Plan  Remains with fluid overload upon admit despite he has been receiving p.o. Lasix 60mg.   -- Nephrology following, started on IV lasix, I/os   -- Echocardiogram showed EF of 60% trace TR       Thrombocytopenia (HCC)  Assessment & Plan  Improving, today at 163, monitor , not actvely bleeding     Leukocytosis  Assessment & Plan  At 15.1, chest x-ray showed layering of the pleural fluid, blood and procalcitonin, UA did not show any infection.  Monitor  No antibiotics indicated    Hypoglycemia associated with diabetes (HCC)- (present on admission)  Assessment & Plan  Resolved       Pleural effusion  Assessment & Plan  pleural effusion on CT renal colic.  chest x-ray it did not show significant pleural effusion and he is maintaining ox saturation on room air.  Hold thoracentesis for now.  Continue with diuresis    Anasarca  Assessment & Plan  Remains with fluid overload upon admit despite he has been receiving p.o. Lasix 60mg.   --Low serum albumin, nephrology following, continue IV Lasix, I/os, daily weight, fluid restriction    Serum albumin decreased- (present on admission)  Assessment & Plan  Secondary renal disease  Nephrotic syndrome vs liver disease.  monitor    Obesity (BMI 35.0-39.9 without comorbidity)  Assessment & Plan  Body mass index is 35.43 kg/m².  Volume overload and should improve with diuresis.    Presence of stent in coronary artery- (present on admission)  Assessment & Plan  Continue outpatient medication therapy  Patient follows with Dr. Andrew Engel, cardiologist  He does not have symptoms of chest pain.    Hypoglycemia  Assessment & Plan  Does not need to be on any oral hypoglycemic medication surgery; clearance of oral hypoglycemic medication impaired in renal failure  Blood glucose improving    Type 2 diabetes mellitus with hyperglycemia, with long-term current use of insulin (HCC)- (present on admission)  Assessment & Plan  Patient does take Lantus along  with glimepiride as an outpatient.     -- Stop glimepiride in the light of AXEL on CKD considering decreased clearance for glimepiride    -- Continuse ISS and hypoglycemia protocol, Accu-Cheks ACH S.    Likely patient would not need Lantus and glimepiride upon discharge    Stage 3a chronic kidney disease (HCC)- (present on admission)  Assessment & Plan  He found to have chronic kidney disease most likely secondary to diabetes per; proven by biopsy   -- Nephrology following, will hold ACEI in the light of AXEL on CKD stage III    I reviewed above assessment and plan no acute changes.  Continue IV diuretics 80 mg Lasix twice daily.  I personally discussed case with nephrologist at the bedside.    Discussed plan of care with bedside RN.    VTE prophylaxis: heparin ppx

## 2022-12-02 NOTE — CARE PLAN
Problem: Knowledge Deficit - Standard  Goal: Patient and family/care givers will demonstrate understanding of plan of care, disease process/condition, diagnostic tests and medications  Outcome: Progressing     Problem: Pain - Standard  Goal: Alleviation of pain or a reduction in pain to the patient’s comfort goal  Outcome: Progressing     Problem: Fluid Volume  Goal: Fluid volume balance will be maintained  Outcome: Progressing   The patient is Stable - Low risk of patient condition declining or worsening    Shift Goals  Clinical Goals: diuresis  Patient Goals: sleep  Family Goals: mima    Progress made toward(s) clinical / shift goals:  Progressing    Patient is not progressing towards the following goals:

## 2022-12-02 NOTE — PROGRESS NOTES
Nephrology Daily Progress Note    Date of Service  12/1/2022    Chief Complaint  52 y.o. male with history of CKD of undetermined stage, from biopsy-proven diabetic nephropathy and chronic microangiopathic changes, diabetes, hypertension who presented 11/27/2022 with flank pain and lower extremity edema.    Interval Problem Update  11/30 -patient had 4 L of urine output in the last 24 hours on Lasix 80 mg IV twice daily.  Off of oxygen.  Denies chest pain, shortness of breath, but thinks his legs and stomach are still very swollen.  12/1 -urine output 7 L in the last 24 hours!  Patient denies chest pain, shortness of breath.  His abdominal/flank pain is gone, but he still has leg and abdominal swelling.    Review of Systems  Review of Systems   Constitutional:  Negative for fever.   Respiratory:  Negative for shortness of breath.    Cardiovascular:  Positive for leg swelling. Negative for chest pain.   Gastrointestinal:  Negative for abdominal pain.   All other systems reviewed and are negative.     Physical Exam  Temp:  [36.7 °C (98.1 °F)-37.2 °C (99 °F)] 37.1 °C (98.8 °F)  Pulse:  [67-75] 68  Resp:  [18] 18  BP: (139-178)/(78-94) 178/94  SpO2:  [93 %-97 %] 96 %    Vitals:    12/01/22 0344 12/01/22 0801 12/01/22 1114 12/01/22 1615   BP: (!) 166/91 (!) 177/90 (!) 168/91 (!) 178/94   Weight:       Height:             Physical Exam  Constitutional:       General: He is not in acute distress.     Appearance: He is well-developed. He is obese. He is not diaphoretic.   HENT:      Head: Normocephalic and atraumatic.      Mouth/Throat:      Mouth: Mucous membranes are moist.      Pharynx: Oropharynx is clear. No oropharyngeal exudate.   Eyes:      General: No scleral icterus.     Extraocular Movements: Extraocular movements intact.   Neck:      Trachea: No tracheal deviation.   Cardiovascular:      Rate and Rhythm: Normal rate.      Heart sounds: Normal heart sounds. No murmur heard.  Pulmonary:      Effort: Pulmonary  effort is normal.      Breath sounds: Normal breath sounds. No stridor. No rales.   Abdominal:      General: There is no distension.      Palpations: Abdomen is soft.      Tenderness: There is no abdominal tenderness.   Musculoskeletal:         General: Swelling (2+ abdominal wall edema) present. Normal range of motion.      Right lower leg: Edema (3-4+) present.      Left lower leg: Edema (3-4+) present.   Skin:     General: Skin is warm and dry.   Neurological:      General: No focal deficit present.      Mental Status: He is alert and oriented to person, place, and time.   Psychiatric:         Mood and Affect: Mood normal.         Behavior: Behavior normal.       Fluids    Intake/Output Summary (Last 24 hours) at 12/1/2022 1656  Last data filed at 12/1/2022 1200  Gross per 24 hour   Intake 360 ml   Output 6850 ml   Net -6490 ml         Laboratory  Labs reviewed, pertinent labs below.  Recent Labs     11/29/22 0348 11/30/22  0947 12/01/22  0018   WBC 19.3* 15.1* 13.1*  13.1*   RBC 3.65* 3.39* 3.33*  3.33*   HEMOGLOBIN 11.1* 10.2* 10.1*  10.1*   HEMATOCRIT 34.1* 30.6* 29.7*  29.7*   MCV 93.4 90.3 89.2  89.2   MCH 30.4 30.1 30.3  30.3   MCHC 32.6* 33.3* 34.0  34.0   RDW 46.8 43.2 42.7  42.7   PLATELETCT 154* 163* 180  180   MPV 11.6 11.5 10.9  10.9       Recent Labs     11/29/22 0348 11/30/22  0947 12/01/22  0018   SODIUM 132* 133* 133*   POTASSIUM 4.4 4.2 4.0   CHLORIDE 107 106 105   CO2 19* 21 19*   GLUCOSE 159* 147* 153*   BUN 34* 39* 41*   CREATININE 2.39* 2.25* 2.34*   CALCIUM 7.5* 7.6* 7.5*                 URINALYSIS:  Lab Results   Component Value Date/Time    COLORURINE Yellow 11/27/2022 1259    CLARITY Clear 11/27/2022 1259    SPECGRAVITY 1.017 11/27/2022 1259    PHURINE 5.0 11/27/2022 1259    KETONES Negative 11/27/2022 1259    PROTEINURIN >=1000 (A) 11/27/2022 1259    BILIRUBINUR Negative 11/27/2022 1259    UROBILU 0.2 11/27/2022 1259    NITRITE Negative 11/27/2022 1259    LEUKESTERAS Negative  11/27/2022 1259    OCCULTBLOOD Moderate (A) 11/27/2022 1259     C  Lab Results   Component Value Date/Time    TOTPROTUR >600.0 (H) 11/19/2022 1709      Lab Results   Component Value Date/Time    CREATININEU 81.09 11/19/2022 1709         Imaging interpreted by radiologist. Imaging reports reviewed with pertinent findings below  DX-CHEST-LIMITED (1 VIEW)   Final Result      Atelectasis within the right lung base.      DX-CHEST-PORTABLE (1 VIEW)   Final Result      Hazy opacification in the right lung is similar to the prior exam and likely represents layering pleural fluid      CT-RENAL COLIC EVALUATION(A/P W/O)   Final Result      1.  No renal or ureteral calculi. No hydronephrosis      2.   Small-to-moderate amount of ascites around the liver and spleen and extending into the pelvis      3.  Partially visualized moderate right pleural effusion with adjacent atelectasis.      4.  Diffuse subcutaneous edema is consistent with anasarca      5.  Cholelithiasis.      6.  Mild diverticulosis.      7.  Calcification pancreatic head is grossly unchanged from the prior CT.            Current Facility-Administered Medications   Medication Dose Route Frequency Provider Last Rate Last Admin    insulin regular (HumuLIN R,NovoLIN R) injection  3-15 Units Subcutaneous 4X/DAY JOHN Antunez, FRANK   6 Units at 12/01/22 1151    furosemide (LASIX) injection 80 mg  80 mg Intravenous BID DIURETIC Varun Ortiz M.D.   80 mg at 12/01/22 0540    sodium bicarbonate tablet 1,300 mg  1,300 mg Oral BID Varun Otriz M.D.   1,300 mg at 12/01/22 0540    thiamine (Vitamin B-1) tablet 100 mg  100 mg Oral DAILY Usman Hermosillo, A.P.R.N.   100 mg at 12/01/22 0540    Pneumococcal 20-Nilda Conj Vacc (PREVNAR 20) syringe 0.5 mL  0.5 mL Intramuscular Once PRN Abdulaziz Hawkins M.D.        influenza vaccine quad (FLUZONE/FLUARIX) injection 0.5 mL  0.5 mL Intramuscular Once PRN Abdulaziz Hawkins M.D.        HYDROmorphone (Dilaudid)  injection 0.5 mg  0.5 mg Intravenous Q3HRS PRN Agustin Zhong M.D.   0.5 mg at 11/29/22 2246    senna-docusate (PERICOLACE or SENOKOT S) 8.6-50 MG per tablet 2 Tablet  2 Tablet Oral BID Karolyn Zavala M.D.   2 Tablet at 11/28/22 1712    And    polyethylene glycol/lytes (MIRALAX) PACKET 1 Packet  1 Packet Oral QDAY PRN Karolyn Zavala M.D.        And    magnesium hydroxide (MILK OF MAGNESIA) suspension 30 mL  30 mL Oral QDAY PRN Karolyn Zavala M.D.        And    bisacodyl (DULCOLAX) suppository 10 mg  10 mg Rectal QDAY PRN Karolyn Zavala M.D.        enoxaparin (Lovenox) inj 40 mg  40 mg Subcutaneous DAILY AT 1800 Karolyn Zavala M.D.   40 mg at 11/30/22 1622    acetaminophen (Tylenol) tablet 650 mg  650 mg Oral Q6HRS PRN Karolyn Zavala M.D.   650 mg at 11/27/22 1923    ondansetron (ZOFRAN) syringe/vial injection 4 mg  4 mg Intravenous Q4HRS PRN Karolyn Zavala M.D.        ondansetron (ZOFRAN ODT) dispertab 4 mg  4 mg Oral Q4HRS PRN Karolyn Zavala M.D.        promethazine (PHENERGAN) tablet 12.5-25 mg  12.5-25 mg Oral Q4HRS PRN Karolyn Zavala M.D.        promethazine (PHENERGAN) suppository 12.5-25 mg  12.5-25 mg Rectal Q4HRS PRN Karolyn Zavala M.D.        prochlorperazine (COMPAZINE) injection 5-10 mg  5-10 mg Intravenous Q4HRS PRN Karolyn Zavala M.D.        dextrose 10 % BOLUS 25 g  25 g Intravenous Q15 MIN PRN Karolyn Zavala M.D.   Stopped at 11/28/22 1222    oxyCODONE immediate-release (ROXICODONE) tablet 5 mg  5 mg Oral Q4HRS PRN Karolyn Zavala M.D.   5 mg at 11/30/22 0622    aspirin EC (ECOTRIN) tablet 81 mg  81 mg Oral DAILY Karolyn Zavala M.D.   81 mg at 12/01/22 0540    atorvastatin (LIPITOR) tablet 80 mg  80 mg Oral Q EVENING Karolyn Zavala M.D.   80 mg at 11/30/22 1622    carvedilol (COREG) tablet 12.5 mg  12.5 mg Oral BID WITH MEALS Karolyn Zavala M.D.   12.5 mg at 12/01/22 0803     cholestyramine (QUESTRAN,PREVALITE) 4 GM powder 4 g  4 g Oral BID Karolyn Zavala M.D.   4 g at 12/01/22 1632    enalapril (VASOTEC) tablet 20 mg  20 mg Oral BID Karolyn Zavala M.D.   20 mg at 12/01/22 0540    omeprazole (PRILOSEC) capsule 20 mg  20 mg Oral QAM Karolyn Zavala M.D.   20 mg at 12/01/22 0540    potassium chloride SA (Kdur) tablet 20 mEq  20 mEq Oral BID Karolyn Zavala M.D.   20 mEq at 12/01/22 0540         Assessment/Plan  52 y.o. male with history of CKD of undetermined stage, from biopsy-proven diabetic nephropathy and chronic microangiopathic changes, diabetes, hypertension who presented 11/27/2022 with flank pain and lower extremity edema.     1.  AXEL on CKD 3A, versus progression of CKD.  Nonoliguric, kidney function stable. This patient has biopsy-proven diabetic nephropathy, with kidney biopsy showing 50% interstitial fibrosis and tubular atrophy in March 2022.  This suggests that the patient already had advanced CKD.  I believe his anasarca is masking his true degree of CKD, and expect the creatinine to worsen with ongoing diuresis.  No acute need for dialysis.  Avoid nephrotoxins.  Check labs daily.    2.  Acute hypoxic respiratory failure, due to volume overload.  Resolved with diuresis.  Continue Lasix.      3.  Hypoglycemia, present prior to admission, improved during this admission.  Likely due to decreased metabolism of sulfonylureas and insulin from advanced CKD.  Stable.  Defer diabetes management to primary team.    4.  Hyponatremia, mild, persistent.  Limit hypotonic fluids.  Control sugars.  Check labs daily.    5.  Metabolic acidosis, slightly worse.  Continue sodium bicarbonate 1300 mg p.o. twice daily.  Check labs daily.    6.  Hypocalcemia, mild, corrected for low albumin.  No need for calcium repletion.    7.  Hypertension, uncontrolled, due to ongoing volume overload.  I recommend continuing Lasix 80 mg IV twice daily.  I do not recommend  increasing any other antihypertensive medications while continuing ongoing active diuresis.  Low-salt diet.      Varun Ortiz MD  Nephrology  [facility]

## 2022-12-02 NOTE — DISCHARGE PLANNING
SHRUTHI Young attempted to introduce community care management to the patient but was unsuccessful.   CHW left a voicemail on the patient's cell phone.   CHW will attempt again.

## 2022-12-02 NOTE — PROGRESS NOTES
Monitor Summary:   Rhythm: SR  Rate: 67-70  Measurement: .12/.10/.42  Ectopy: rare pvc's

## 2022-12-02 NOTE — PROGRESS NOTES
Report given by diamante RNRoque. Assumed pt care. Pt resting in bed. A&Ox4. No current needs at this time. Call light within reach. Bed in low and locked position. Will continue to monitor.

## 2022-12-02 NOTE — CARE PLAN
The patient is Stable - Low risk of patient condition declining or worsening    Shift Goals  Clinical Goals: diurese  Patient Goals: Sleep  Family Goals: JENNIFER    Progress made toward(s) clinical / shift goals:      Problem: Knowledge Deficit - Standard  Goal: Patient and family/care givers will demonstrate understanding of plan of care, disease process/condition, diagnostic tests and medications  Description: Target End Date:  1-3 days or as soon as patient condition allows    Document in Patient Education    1.  Patient and family/caregiver oriented to unit, equipment, visitation policy and means for communicating concern  2.  Complete/review Learning Assessment  3.  Assess knowledge level of disease process/condition, treatment plan, diagnostic tests and medications  4.  Explain disease process/condition, treatment plan, diagnostic tests and medications  Outcome: Progressing     Problem: Pain - Standard  Goal: Alleviation of pain or a reduction in pain to the patient’s comfort goal  Description: Target End Date:  Prior to discharge or change in level of care    Document on Vitals flowsheet    1.  Document pain using the appropriate pain scale per order or unit policy  2.  Educate and implement non-pharmacologic comfort measures (i.e. relaxation, distraction, massage, cold/heat therapy, etc.)  3.  Pain management medications as ordered  4.  Reassess pain after pain med administration per policy  5.  If opiods administered assess patient's response to pain medication is appropriate per POSS sedation scale  6.  Follow pain management plan developed in collaboration with patient and interdisciplinary team (including palliative care or pain specialists if applicable)  Outcome: Progressing     Problem: Psychosocial  Goal: Patient's level of anxiety will decrease  Description: Target End Date:  1-3 days or as soon as patient condition allows    1.  Collaborate with patient and family/caregiver to identify triggers and  develop strategies to cope with anxiety  2.  Implement stimuli reduction, calming techniques  3.  Pharmacologic management per provider order  4.  Encourage patient/family/care giver participation  5.  Collaborate with interdisciplinary team including Psychologist or Behavioral Health Team as needed  Outcome: Progressing  Goal: Patient's ability to verbalize feelings about condition will improve  Description: Target End Date:  Prior to discharge or change in level of care    1.  Discuss coping with medical condition and its effects  2.  Encourage patient participation in care  3.  Encourage acknowledgement of body changes and accompanying emotions  4.  Perform depression screening  Outcome: Progressing  Goal: Patient's ability to re-evaluate and adapt role responsibilities will improve  Description: Target End Date:  Prior to discharge or change in level of care    1.  Assess family support  2.  Encourage support system participation in care  3.  Encouraged verbalization of feelings regarding caregiver responsibilities  4.  Discuss changes in role and responsibilities caused by patient's condition  Outcome: Progressing  Goal: Patient and family will demonstrate ability to cope with life altering diagnosis and/or procedure  Description: Target End Date:  1-3 days or as soon as patient condition allows    1. Expect initial shock and disbelief following diagnosis of cancer and traumatizing procedures (disfiguring surgery, colostomy, amputation).  2.  Assess patient and family/caregiver for stage of grief currently being experienced. Explain process as appropriate.  3.  Provide open, nonjudgmental environment. Use therapeutic communication skills of Active-Listening, acknowledgment, and so on.  4.  Encourage verbalization of thoughts or concerns and accept expressions of sadness, anger, rejection. Acknowledge normality of these feelings  5.  Be aware of mood swings, hostility, and other acting-out behavior. Set limits  on inappropriate behavior, redirect negative thinking  6.  Be aware of debilitating depression. Ask patient direct questions about state of mind.  7.  Visit frequently and provide physical contact as appropriate, or provide frequent phone support as appropriate for setting. Arrange for care provider and support person to stay with patient as needed  8.  Reinforce teaching regarding disease process and treatments and provide information as appropriate about dying. Be honest; do not give false hope while providing emotional support  9.  Review past life experiences, role changes, and coping skills. Talk about things that interest the patient  Outcome: Progressing  Goal: Spiritual and cultural needs incorporated into hospitalization  Description: Target End Date:  End of day 1    1.  Encourage verbalization of feelings, concerns, expectations and needs  2.  Collaborate with Case Management/  3.  Collaborate with Pastoral Care to meet spiritual needs  Outcome: Progressing     Problem: Communication  Goal: The ability to communicate needs accurately and effectively will improve  Description: Target End Date:  End of day 1    1.  Assess ability to communicate and understand  2.  Provide augmentative or alternative methods of communication devices  3.  Use /language line as appropriate  4.  Collaborate with Speech Therapy as needed  Outcome: Progressing     Problem: Respiratory  Goal: Patient will achieve/maintain optimum respiratory ventilation and gas exchange  Description: Target End Date:  Prior to discharge or change in level of care    Document on Assessment flowsheet    1.  Assess and monitor rate, rhythm, depth and effort of respiration  2.  Breath sounds assessed qshift and/or as needed  3.  Assess O2 saturation, administer/titrate oxygen as ordered  4.  Position patient for maximum ventilatory efficiency  5.  Turn, cough, and deep breath with splinting to improve effectiveness  6.   Collaborate with RT to administer medication/treatments per order  7.  Encourage use of incentive spirometer and encourage patient to cough after use and utilize splinting techniques if applicable  8.  Airway suctioning  9.  Monitor sputum production for changes in color, consistency and frequency  10. Perform frequent oral hygiene  11. Alternate physical activity with rest periods  Outcome: Progressing     Problem: Fluid Volume  Goal: Fluid volume balance will be maintained  Description: Target End Date:  Prior to discharge or change in level of care    Document on I/O flowsheet    1.  Monitor intake and output as ordered  2.  Promote oral intake as appropriate  3.  Report inadequate intake or output to physician  4.  Administer IV therapy as ordered  5.  Weights per provider order  6.  Assess for signs and symptoms of bleeding  7.  Monitor for signs of fluid overload (respiratory changes, edema, weight gain, increased abdominal girth)  8.  Monitor of signs for inadequate fluid volume (poor skin turgor, dry mucous membranes)  9.  Instruct patient on adherence to fluid restrictions  Outcome: Progressing     Problem: Nutrition  Goal: Patient's nutritional and fluid intake will be adequate or improve  Description: Target End Date:  Prior to discharge or change in level of care    Document on I/O flowsheet    1.  Monitor nutritional intake  2.  Monitor weight per provider order  3.  Assess patient's ability to take oral nutrition  4.  Collaborate with Speech Therapy, Dietitian and interdisciplinary team for appropriate feeding and fluid intake  5.  Assist with feeding  Outcome: Progressing  Goal: Enteral nutrition will be maintained or improve  Description: Target End Date:  Prior to discharge or change in level of care    1. Enteral access to be obtained or modified  2. Advance to goal rate per protocol  3. Collaborate with Clinical Dietitian for signs/symptoms of intolerance  4. Weights per provider order  5.  Consider Speech Therapy consult for swallowing difficulties  Outcome: Progressing  Goal: Enteral nutrition will be maintained or improve  Description: Target End Date:  Prior to discharge or change in level of care    1.  Fingerstick glucose every 8 hours  2.  Notify provider for fever or elevated glucose  3.  TPN tubing and port changes every 24 hours.  Turn TPN through dedicated line. (Document on LDA)  4.  TPN dressing changes 24 hours after insertion and then every Saturday  (Document on LDA)  5.  Daily weights  6.  Monitor TPN lab results  7.  Collaborate with Clinical Dietician  8.  Collaborate with Pharmacist  Outcome: Progressing     Problem: Urinary Elimination  Goal: Establish and maintain regular urinary output  Description: Target End Date:  Prior to discharge or change in level of care    Document on I/O and Assessment flowsheets    1.  Evaluate need to continue indwelling catheter every shift  2.  Assess signs and symptoms of urinary retention  3.  Assess post-void residual volumes  4.  Implement bladder training program  5.  Encourage scheduled voidings  6.  Assist patient to sit on bedside commode or toilet for voiding  7.  Educate patient and family/caregiver on use and purpose of urine collection devices (document in Patient Education)  Outcome: Progressing     Problem: Bowel Elimination  Goal: Establish and maintain regular bowel function  Description: Target End Date:  Prior to discharge or change in level of care    1.   Note date of last BM  2.   Educate about diet, fluid intake, medication and activity to promote bowel function  3.   Educate signs and symptoms of constipation and interventions to implement  4.   Pharmacologic bowel management per provider order  5.   Regular toileting schedule  6.   Upright position for toileting  7.   High fiber diet  8.   Encourage hydration  9.   Collaborate with Clinical Dietician  10. Care and maintenance of ostomy if applicable  Outcome: Progressing      Problem: Mobility  Goal: Patient's capacity to carry out activities will improve  Description: Target End Date:  Prior to discharge or change in level of care    1.  Assess for barriers to mobility/activity  2.  Implement activity per interdisciplinary team recommendations  3.  Target activity level identified and patient/family/caregiver aware of goal  4.  Provide assistive devices  5.  Instruct patient/caregiver on proper use of assistive/adaptive devices  6.  Schedule activities and rest periods to decrease effects of fatigue  7.  Encourage mobilization to extent of ability  8.  Maintain proper body alignment  9.  Provide adequate pain management to allow progressive mobilization  10. Implement pace maker precautions as needed  Outcome: Progressing     Problem: Self Care  Goal: Patient will have the ability to perform ADLs independently or with assistance (bathe, groom, dress, toilet and feed)  Description: Target End Date:  Prior to discharge or change in level of care    Document on ADL flowsheet    1.  Assess the capability and level of deficiency to perform ADLs  2.  Encourage family/care giver involvement  3.  Provide assistive devices  4.  Consider PT/OT evaluations  5.  Maintain support, give positive feedback, encourage self-care allowing extra time and verbal cuing as needed  6.  Avoid doing something for patients they can do themselves, but provide assistance as needed  7.  Assist in anticipating/planning individual needs  8.  Collaborate with Case Management and  to meet discharge needs  Outcome: Progressing     Problem: Infection - Standard  Goal: Patient will remain free from infection  Description: Target End Date:  Prior to discharge or change in level of care    1.  Utilize Standard Precautions at all times to reduce the risk of transmission of microorganisms from both recognized and  unrecognized sources of infection  2.  Infection prevention handouts provided  (general/device/diagnosis specific) and documented in Patient Education  3.  Educate patient and family/caregiver on isolation precautions if applicable  Outcome: Progressing

## 2022-12-02 NOTE — PROGRESS NOTES
Hospital Medicine Daily Progress Note    Date of Service  12/2/2022    Chief Complaint  Beni Moore is a 52 y.o. male admitted 11/27/2022 with   Chief Complaint   Patient presents with    Abdominal Pain    Flank Pain     Pt BIB EMS, report that pt has bilat flank/Abd pain. Pt reports recurrent complaint, hx of Kidney stones.            Hospital Course  This is  a 52-year-old male with a past medical history significant for type 2 diabetes mellitus with glyco of 5.5 , hyperlipidemia, CKD, hypertension presented to ER on 11/27/2022 with a complaint of abdominal pain and flank pain.    CT renal concerning for right pleural effusion, noted to have severe hypoglycemia with a blood glucose of 31.  During the stay in the hospital, nephrology was consulted, nephrology started the patient on IV Lasix 80 mg bid    Interval events:    12/01/22: Patient continues to have 2+ pitting edema.  Vitals have been stable.  He is on room air.  Creatinine is 2.16.  Sodium is improved to 134.      Plan of care has been discussed the patient, nursing staff, case management.      Consultants/Specialty  nephrology    Code Status  Full Code    Disposition  Patient is not medically cleared for discharge.   Anticipate discharge to to home with organized home healthcare and close outpatient follow-up.  I have placed the appropriate orders for post-discharge needs.    Review of Systems  Review of Systems   Constitutional:  Positive for malaise/fatigue. Negative for chills, fever and weight loss.   HENT:  Negative for hearing loss and tinnitus.    Eyes:  Negative for blurred vision, double vision, photophobia and pain.   Respiratory:  Negative for cough, sputum production and shortness of breath.    Cardiovascular:  Positive for leg swelling (Significantly improved). Negative for chest pain, palpitations and orthopnea.   Gastrointestinal:  Negative for abdominal pain, constipation, diarrhea, nausea and vomiting.   Genitourinary:  Negative for  dysuria, frequency and urgency.   Musculoskeletal:  Negative for back pain, joint pain, myalgias and neck pain.   Skin:  Negative for rash.   Neurological:  Negative for dizziness, tingling, tremors, sensory change, speech change, focal weakness and headaches.   Psychiatric/Behavioral:  Negative for hallucinations and substance abuse.    All other systems reviewed and are negative.     Physical Exam  Temp:  [36.7 °C (98.1 °F)-37.1 °C (98.8 °F)] 36.8 °C (98.2 °F)  Pulse:  [66-68] 66  Resp:  [16-18] 18  BP: (142-179)/(83-94) 179/88  SpO2:  [94 %-97 %] 96 %    Physical Exam  Vitals reviewed.   Constitutional:       General: He is not in acute distress.     Appearance: He is obese.   HENT:      Head: Normocephalic and atraumatic. No contusion.      Right Ear: External ear normal.      Left Ear: External ear normal.      Nose: Nose normal.      Mouth/Throat:      Mouth: Mucous membranes are dry.      Pharynx: No oropharyngeal exudate.   Eyes:      General:         Right eye: No discharge.         Left eye: No discharge.   Cardiovascular:      Rate and Rhythm: Normal rate and regular rhythm.      Heart sounds: No murmur heard.    No friction rub. No gallop.   Pulmonary:      Effort: Pulmonary effort is normal.      Breath sounds: No wheezing or rhonchi.   Abdominal:      General: There is no distension.      Palpations: Abdomen is soft.      Tenderness: There is no abdominal tenderness. There is no rebound.   Musculoskeletal:         General: No swelling or tenderness. Normal range of motion.      Cervical back: No rigidity. No muscular tenderness.      Right lower leg: Edema present.      Left lower leg: Edema present.   Skin:     General: Skin is warm and dry.      Coloration: Skin is not jaundiced.   Neurological:      General: No focal deficit present.      Mental Status: He is alert.      Cranial Nerves: No cranial nerve deficit.      Sensory: No sensory deficit.   Psychiatric:         Mood and Affect: Mood normal.        Fluids    Intake/Output Summary (Last 24 hours) at 12/2/2022 1254  Last data filed at 12/2/2022 0917  Gross per 24 hour   Intake --   Output 6550 ml   Net -6550 ml       Laboratory  Recent Labs     11/30/22  0947 12/01/22  0018 12/02/22  0009   WBC 15.1* 13.1*  13.1* 11.8*   RBC 3.39* 3.33*  3.33* 3.37*   HEMOGLOBIN 10.2* 10.1*  10.1* 10.2*   HEMATOCRIT 30.6* 29.7*  29.7* 29.4*   MCV 90.3 89.2  89.2 87.2   MCH 30.1 30.3  30.3 30.3   MCHC 33.3* 34.0  34.0 34.7   RDW 43.2 42.7  42.7 40.9   PLATELETCT 163* 180  180 174   MPV 11.5 10.9  10.9 10.8     Recent Labs     11/30/22  0947 12/01/22 0018 12/02/22  0009   SODIUM 133* 133* 134*   POTASSIUM 4.2 4.0 3.7   CHLORIDE 106 105 103   CO2 21 19* 22   GLUCOSE 147* 153* 170*   BUN 39* 41* 38*   CREATININE 2.25* 2.34* 2.16*   CALCIUM 7.6* 7.5* 7.6*                   Imaging  DX-CHEST-LIMITED (1 VIEW)   Final Result      Atelectasis within the right lung base.      DX-CHEST-PORTABLE (1 VIEW)   Final Result      Hazy opacification in the right lung is similar to the prior exam and likely represents layering pleural fluid      CT-RENAL COLIC EVALUATION(A/P W/O)   Final Result      1.  No renal or ureteral calculi. No hydronephrosis      2.   Small-to-moderate amount of ascites around the liver and spleen and extending into the pelvis      3.  Partially visualized moderate right pleural effusion with adjacent atelectasis.      4.  Diffuse subcutaneous edema is consistent with anasarca      5.  Cholelithiasis.      6.  Mild diverticulosis.      7.  Calcification pancreatic head is grossly unchanged from the prior CT.             Assessment/Plan  * Fluid overload- (present on admission)  Assessment & Plan  Remains with fluid overload upon admit despite he has been receiving p.o. Lasix 60mg.   -- Nephrology following, started on IV lasix, I/os   -- Echocardiogram showed EF of 60% trace TR       Thrombocytopenia (HCC)  Assessment & Plan  Improving, today at 163,  monitor , not actvely bleeding     Leukocytosis  Assessment & Plan  At 15.1, chest x-ray showed layering of the pleural fluid, blood and procalcitonin, UA did not show any infection.  Monitor  No antibiotics indicated    Hypoglycemia associated with diabetes (HCC)- (present on admission)  Assessment & Plan  Resolved       Pleural effusion  Assessment & Plan  pleural effusion on CT renal colic.  chest x-ray it did not show significant pleural effusion and he is maintaining ox saturation on room air.  Hold thoracentesis for now.  Continue with diuresis    Anasarca  Assessment & Plan  Remains with fluid overload upon admit despite he has been receiving p.o. Lasix 60mg.   --Low serum albumin, nephrology following, continue IV Lasix, I/os, daily weight, fluid restriction    Serum albumin decreased- (present on admission)  Assessment & Plan  Secondary renal disease  Nephrotic syndrome vs liver disease.  monitor    Obesity (BMI 35.0-39.9 without comorbidity)  Assessment & Plan  Body mass index is 35.43 kg/m².  Volume overload and should improve with diuresis.    Presence of stent in coronary artery- (present on admission)  Assessment & Plan  Continue outpatient medication therapy  Patient follows with Dr. Andrew Engel, cardiologist  He does not have symptoms of chest pain.    Hypoglycemia  Assessment & Plan  Does not need to be on any oral hypoglycemic medication surgery; clearance of oral hypoglycemic medication impaired in renal failure  Blood glucose improving    Type 2 diabetes mellitus with hyperglycemia, with long-term current use of insulin (HCC)- (present on admission)  Assessment & Plan  Patient does take Lantus along with glimepiride as an outpatient.     -- Stop glimepiride in the light of AXEL on CKD considering decreased clearance for glimepiride    -- Continuse ISS and hypoglycemia protocol, Accu-Cheks ACH S.    Likely patient would not need Lantus and glimepiride upon discharge    Stage 3a chronic kidney  disease (HCC)- (present on admission)  Assessment & Plan  He found to have chronic kidney disease most likely secondary to diabetes per; proven by biopsy   -- Nephrology following, will hold ACEI in the light of AXEL on CKD stage III    I reviewed above assessment and plan no acute changes.  Continue IV diuretics 80 mg Lasix twice daily.  I personally discussed case with nephrologist at the bedside.    Discussed plan of care with bedside RN.    VTE prophylaxis: heparin ppx

## 2022-12-03 LAB
ANION GAP SERPL CALC-SCNC: 10 MMOL/L (ref 7–16)
BACTERIA BLD CULT: NORMAL
BACTERIA BLD CULT: NORMAL
BUN SERPL-MCNC: 37 MG/DL (ref 8–22)
CALCIUM SERPL-MCNC: 7.6 MG/DL (ref 8.5–10.5)
CHLORIDE SERPL-SCNC: 105 MMOL/L (ref 96–112)
CO2 SERPL-SCNC: 22 MMOL/L (ref 20–33)
CREAT SERPL-MCNC: 1.8 MG/DL (ref 0.5–1.4)
ERYTHROCYTE [DISTWIDTH] IN BLOOD BY AUTOMATED COUNT: 39.7 FL (ref 35.9–50)
GFR SERPLBLD CREATININE-BSD FMLA CKD-EPI: 44 ML/MIN/1.73 M 2
GLUCOSE BLD STRIP.AUTO-MCNC: 165 MG/DL (ref 65–99)
GLUCOSE BLD STRIP.AUTO-MCNC: 195 MG/DL (ref 65–99)
GLUCOSE BLD STRIP.AUTO-MCNC: 210 MG/DL (ref 65–99)
GLUCOSE BLD STRIP.AUTO-MCNC: 226 MG/DL (ref 65–99)
GLUCOSE SERPL-MCNC: 131 MG/DL (ref 65–99)
HCT VFR BLD AUTO: 30.4 % (ref 42–52)
HGB BLD-MCNC: 10.6 G/DL (ref 14–18)
MCH RBC QN AUTO: 30.2 PG (ref 27–33)
MCHC RBC AUTO-ENTMCNC: 34.9 G/DL (ref 33.7–35.3)
MCV RBC AUTO: 86.6 FL (ref 81.4–97.8)
PHOSPHATE SERPL-MCNC: 3 MG/DL (ref 2.5–4.5)
PLATELET # BLD AUTO: 186 K/UL (ref 164–446)
PMV BLD AUTO: 10.5 FL (ref 9–12.9)
POTASSIUM SERPL-SCNC: 3.3 MMOL/L (ref 3.6–5.5)
RBC # BLD AUTO: 3.51 M/UL (ref 4.7–6.1)
SIGNIFICANT IND 70042: NORMAL
SIGNIFICANT IND 70042: NORMAL
SITE SITE: NORMAL
SITE SITE: NORMAL
SODIUM SERPL-SCNC: 137 MMOL/L (ref 135–145)
SOURCE SOURCE: NORMAL
SOURCE SOURCE: NORMAL
WBC # BLD AUTO: 10.5 K/UL (ref 4.8–10.8)

## 2022-12-03 PROCEDURE — 700111 HCHG RX REV CODE 636 W/ 250 OVERRIDE (IP): Performed by: INTERNAL MEDICINE

## 2022-12-03 PROCEDURE — 700102 HCHG RX REV CODE 250 W/ 637 OVERRIDE(OP): Performed by: STUDENT IN AN ORGANIZED HEALTH CARE EDUCATION/TRAINING PROGRAM

## 2022-12-03 PROCEDURE — A9270 NON-COVERED ITEM OR SERVICE: HCPCS | Performed by: NURSE PRACTITIONER

## 2022-12-03 PROCEDURE — A9270 NON-COVERED ITEM OR SERVICE: HCPCS | Performed by: INTERNAL MEDICINE

## 2022-12-03 PROCEDURE — 700102 HCHG RX REV CODE 250 W/ 637 OVERRIDE(OP): Performed by: NURSE PRACTITIONER

## 2022-12-03 PROCEDURE — 84100 ASSAY OF PHOSPHORUS: CPT

## 2022-12-03 PROCEDURE — 80048 BASIC METABOLIC PNL TOTAL CA: CPT

## 2022-12-03 PROCEDURE — 770020 HCHG ROOM/CARE - TELE (206)

## 2022-12-03 PROCEDURE — 36415 COLL VENOUS BLD VENIPUNCTURE: CPT

## 2022-12-03 PROCEDURE — A9270 NON-COVERED ITEM OR SERVICE: HCPCS | Performed by: STUDENT IN AN ORGANIZED HEALTH CARE EDUCATION/TRAINING PROGRAM

## 2022-12-03 PROCEDURE — 85027 COMPLETE CBC AUTOMATED: CPT

## 2022-12-03 PROCEDURE — 700102 HCHG RX REV CODE 250 W/ 637 OVERRIDE(OP): Performed by: INTERNAL MEDICINE

## 2022-12-03 PROCEDURE — 99232 SBSQ HOSP IP/OBS MODERATE 35: CPT | Performed by: STUDENT IN AN ORGANIZED HEALTH CARE EDUCATION/TRAINING PROGRAM

## 2022-12-03 PROCEDURE — 99233 SBSQ HOSP IP/OBS HIGH 50: CPT | Performed by: INTERNAL MEDICINE

## 2022-12-03 PROCEDURE — 82962 GLUCOSE BLOOD TEST: CPT | Mod: 91

## 2022-12-03 RX ORDER — POTASSIUM CHLORIDE 20 MEQ/1
40 TABLET, EXTENDED RELEASE ORAL ONCE
Status: COMPLETED | OUTPATIENT
Start: 2022-12-03 | End: 2022-12-03

## 2022-12-03 RX ADMIN — ENOXAPARIN SODIUM 40 MG: 40 INJECTION SUBCUTANEOUS at 16:53

## 2022-12-03 RX ADMIN — FUROSEMIDE 80 MG: 10 INJECTION, SOLUTION INTRAMUSCULAR; INTRAVENOUS at 06:01

## 2022-12-03 RX ADMIN — THIAMINE HCL TAB 100 MG 100 MG: 100 TAB at 06:01

## 2022-12-03 RX ADMIN — POTASSIUM CHLORIDE 20 MEQ: 1500 TABLET, EXTENDED RELEASE ORAL at 06:01

## 2022-12-03 RX ADMIN — INSULIN HUMAN 3 UNITS: 100 INJECTION, SOLUTION PARENTERAL at 06:10

## 2022-12-03 RX ADMIN — INSULIN HUMAN 6 UNITS: 100 INJECTION, SOLUTION PARENTERAL at 20:09

## 2022-12-03 RX ADMIN — CARVEDILOL 12.5 MG: 12.5 TABLET, FILM COATED ORAL at 07:30

## 2022-12-03 RX ADMIN — INSULIN HUMAN 6 UNITS: 100 INJECTION, SOLUTION PARENTERAL at 11:53

## 2022-12-03 RX ADMIN — POTASSIUM CHLORIDE 20 MEQ: 1500 TABLET, EXTENDED RELEASE ORAL at 16:54

## 2022-12-03 RX ADMIN — ASPIRIN 81 MG: 81 TABLET, COATED ORAL at 06:01

## 2022-12-03 RX ADMIN — SODIUM BICARBONATE 1300 MG: 650 TABLET ORAL at 16:54

## 2022-12-03 RX ADMIN — CARVEDILOL 12.5 MG: 12.5 TABLET, FILM COATED ORAL at 16:54

## 2022-12-03 RX ADMIN — FUROSEMIDE 80 MG: 10 INJECTION, SOLUTION INTRAMUSCULAR; INTRAVENOUS at 16:53

## 2022-12-03 RX ADMIN — INSULIN HUMAN 3 UNITS: 100 INJECTION, SOLUTION PARENTERAL at 16:56

## 2022-12-03 RX ADMIN — SODIUM BICARBONATE 1300 MG: 650 TABLET ORAL at 06:01

## 2022-12-03 RX ADMIN — POTASSIUM CHLORIDE 40 MEQ: 1500 TABLET, EXTENDED RELEASE ORAL at 08:29

## 2022-12-03 RX ADMIN — CHOLESTYRAMINE 4 G: 4 POWDER, FOR SUSPENSION ORAL at 10:40

## 2022-12-03 RX ADMIN — OMEPRAZOLE 20 MG: 20 CAPSULE, DELAYED RELEASE ORAL at 06:01

## 2022-12-03 RX ADMIN — ENALAPRIL MALEATE 20 MG: 10 TABLET ORAL at 06:01

## 2022-12-03 RX ADMIN — CHOLESTYRAMINE 4 G: 4 POWDER, FOR SUSPENSION ORAL at 18:14

## 2022-12-03 RX ADMIN — ATORVASTATIN CALCIUM 80 MG: 80 TABLET, FILM COATED ORAL at 16:54

## 2022-12-03 RX ADMIN — ENALAPRIL MALEATE 20 MG: 10 TABLET ORAL at 16:54

## 2022-12-03 ASSESSMENT — ENCOUNTER SYMPTOMS
CONSTIPATION: 0
SENSORY CHANGE: 0
ORTHOPNEA: 0
BLURRED VISION: 0
SPEECH CHANGE: 0
COUGH: 0
DIARRHEA: 0
NECK PAIN: 0
MYALGIAS: 0
FEVER: 0
EYE PAIN: 0
DOUBLE VISION: 0
CHILLS: 0
ABDOMINAL PAIN: 0
SHORTNESS OF BREATH: 0
WEIGHT LOSS: 0
SPUTUM PRODUCTION: 0
VOMITING: 0
PALPITATIONS: 0
NAUSEA: 0
TREMORS: 0
TINGLING: 0
PHOTOPHOBIA: 0
DIZZINESS: 0
FOCAL WEAKNESS: 0
HALLUCINATIONS: 0
BACK PAIN: 0
HEADACHES: 0

## 2022-12-03 ASSESSMENT — FIBROSIS 4 INDEX: FIB4 SCORE: 1.53

## 2022-12-03 ASSESSMENT — LIFESTYLE VARIABLES: SUBSTANCE_ABUSE: 0

## 2022-12-03 ASSESSMENT — PAIN DESCRIPTION - PAIN TYPE: TYPE: ACUTE PAIN

## 2022-12-03 NOTE — PROGRESS NOTES
Report given by night nurse, oRque HENAO. Assumed pt care. Pt sleeping at the moment. No needs at this time. Call light within reach. Bed in low and locked position. Will continue to monitor.    Yes

## 2022-12-03 NOTE — DISCHARGE PLANNING
Care Transition Team Assessment    Information Source  Orientation Level: Oriented X4  Information Given By: Patient  Who is responsible for making decisions for patient? : Patient         Elopement Risk  Legal Hold: No  Ambulatory or Self Mobile in Wheelchair: Yes  Disoriented: No  Psychiatric Symptoms: None  History of Wandering: No  Elopement this Admit: No  Vocalizing Wanting to Leave: No  Displays Behaviors, Body Language Wanting to Leave: No-Not at Risk for Elopement  Elopement Risk: Not at Risk for Elopement    Interdisciplinary Discharge Planning  Lives with - Patient's Self Care Capacity: Spouse  Patient or legal guardian wants to designate a caregiver: No  Support Systems: Family Member(s), Spouse / Significant Other, Friends / Neighbors  Housing / Facility: [address] [facility]  Durable Medical Equipment: Not Applicable    Discharge Preparedness  What is your plan after discharge?: Home with help  What are your discharge supports?: Spouse  Prior Functional Level: Ambulatory    Functional Assesment  Prior Functional Level: Ambulatory         Vision / Hearing Impairment  Vision Impairment : Yes  Right Eye Vision: Impaired, Wears Glasses  Left Eye Vision: Impaired, Wears Glasses  Hearing Impairment: Right Ear  Does Pt Need Special Equipment for the Hearing Impaired?: No              Domestic Abuse  Have you ever been the victim of abuse or violence?: No  Physical Abuse or Sexual Abuse: No  Verbal Abuse or Emotional Abuse: No  Possible Abuse/Neglect Reported to:: Not Applicable              Anticipated Discharge Information  Discharge Disposition: Discharged to home/self care (01)  Discharge Address: [address] [facility] [address]  Discharge Contact Phone Number: [phone]

## 2022-12-03 NOTE — PROGRESS NOTES
[facility] Medicine Daily Progress Note    Date of Service  12/3/2022    Chief Complaint  Beni Moore is a 52 y.o. male admitted 11/27/2022 with   Chief Complaint   Patient presents with    Abdominal Pain    Flank Pain     Pt BIB EMS, report that pt has bilat flank/Abd pain. Pt reports recurrent complaint, hx of Kidney stones.            Hospital Course  This is  a 52-year-old male with a past medical history significant for type 2 diabetes mellitus with glyco of 5.5 , hyperlipidemia, CKD, hypertension presented to ER on 11/27/2022 with a complaint of abdominal pain and flank pain.    CT renal concerning for right pleural effusion, noted to have severe hypoglycemia with a blood glucose of 31.  During the stay in the hospital, nephrology was consulted, nephrology started the patient on IV Lasix 80 mg bid    Interval events:    12/3: 3.3 L overnight.  Vitals are stable.  Patient still has lower extremity edema.  Creatinine is 1.0.  Mildly elevated blood pressure 162/89 likely secondary to volume overload. K 3.3, Kcl ordered      Plan of care has been discussed the patient, nursing staff, case management.      Consultants/Specialty  nephrology    Code Status  Full Code    Disposition  Patient is not medically cleared for discharge.   Anticipate discharge to to home with organized home healthcare and close outpatient follow-up.  I have placed the appropriate orders for post-discharge needs.    Review of Systems  Review of Systems   Constitutional:  Positive for malaise/fatigue. Negative for chills, fever and weight loss.   HENT:  Negative for hearing loss and tinnitus.    Eyes:  Negative for blurred vision, double vision, photophobia and pain.   Respiratory:  Negative for cough, sputum production and shortness of breath.    Cardiovascular:  Positive for leg swelling (Significantly improved). Negative for chest pain, palpitations and orthopnea.   Gastrointestinal:  Negative for abdominal pain, constipation, diarrhea,  nausea and vomiting.   Genitourinary:  Negative for dysuria, frequency and urgency.   Musculoskeletal:  Negative for back pain, joint pain, myalgias and neck pain.   Skin:  Negative for rash.   Neurological:  Negative for dizziness, tingling, tremors, sensory change, speech change, focal weakness and headaches.   Psychiatric/Behavioral:  Negative for hallucinations and substance abuse.    All other systems reviewed and are negative.     Physical Exam  Temp:  [36.7 °C (98.1 °F)-37.5 °C (99.5 °F)] 36.7 °C (98.1 °F)  Pulse:  [60-71] 71  Resp:  [17-18] 18  BP: (150-176)/(86-98) 176/98  SpO2:  [95 %-98 %] 98 %    Physical Exam  Vitals reviewed.   Constitutional:       General: He is not in acute distress.     Appearance: He is obese.   HENT:      Head: Normocephalic and atraumatic. No contusion.      Right Ear: External ear normal.      Left Ear: External ear normal.      Nose: Nose normal.      Mouth/Throat:      Mouth: Mucous membranes are dry.      Pharynx: No oropharyngeal exudate.   Eyes:      General:         Right eye: No discharge.         Left eye: No discharge.   Cardiovascular:      Rate and Rhythm: Normal rate and regular rhythm.      Heart sounds: No murmur heard.    No friction rub. No gallop.   Pulmonary:      Effort: Pulmonary effort is normal.      Breath sounds: No wheezing or rhonchi.   Abdominal:      General: There is no distension.      Palpations: Abdomen is soft.      Tenderness: There is no abdominal tenderness. There is no rebound.   Musculoskeletal:         General: No swelling or tenderness. Normal range of motion.      Cervical back: No rigidity. No muscular tenderness.      Right lower leg: Edema present.      Left lower leg: Edema present.   Skin:     General: Skin is warm and dry.      Coloration: Skin is not jaundiced.   Neurological:      General: No focal deficit present.      Mental Status: He is alert.      Cranial Nerves: No cranial nerve deficit.      Sensory: No sensory deficit.    Psychiatric:         Mood and Affect: Mood normal.       Fluids    Intake/Output Summary (Last 24 hours) at 12/3/2022 1524  Last data filed at 12/3/2022 1041  Gross per 24 hour   Intake --   Output 3300 ml   Net -3300 ml         Laboratory  Recent Labs     12/01/22 0018 12/02/22  0009 12/03/22  0018   WBC 13.1*  13.1* 11.8* 10.5   RBC 3.33*  3.33* 3.37* 3.51*   HEMOGLOBIN 10.1*  10.1* 10.2* 10.6*   HEMATOCRIT 29.7*  29.7* 29.4* 30.4*   MCV 89.2  89.2 87.2 86.6   MCH 30.3  30.3 30.3 30.2   MCHC 34.0  34.0 34.7 34.9   RDW 42.7  42.7 40.9 39.7   PLATELETCT 180  180 174 186   MPV 10.9  10.9 10.8 10.5       Recent Labs     12/01/22 0018 12/02/22  0009 12/03/22  0018   SODIUM 133* 134* 137   POTASSIUM 4.0 3.7 3.3*   CHLORIDE 105 103 105   CO2 19* 22 22   GLUCOSE 153* 170* 131*   BUN 41* 38* 37*   CREATININE 2.34* 2.16* 1.80*   CALCIUM 7.5* 7.6* 7.6*                     Imaging  DX-CHEST-LIMITED (1 VIEW)   Final Result      Atelectasis within the right lung base.      DX-CHEST-PORTABLE (1 VIEW)   Final Result      Hazy opacification in the right lung is similar to the prior exam and likely represents layering pleural fluid      CT-RENAL COLIC EVALUATION(A/P W/O)   Final Result      1.  No renal or ureteral calculi. No hydronephrosis      2.   Small-to-moderate amount of ascites around the liver and spleen and extending into the pelvis      3.  Partially visualized moderate right pleural effusion with adjacent atelectasis.      4.  Diffuse subcutaneous edema is consistent with anasarca      5.  Cholelithiasis.      6.  Mild diverticulosis.      7.  Calcification pancreatic head is grossly unchanged from the prior CT.             Assessment/Plan  * Fluid overload- (present on admission)  Assessment & Plan  Remains with fluid overload upon admit despite he has been receiving p.o. Lasix 60mg.   -- Nephrology following, started on IV lasix, I/os   -- Echocardiogram showed EF of 60% trace TR       Thrombocytopenia  (HCC)  Assessment & Plan  Improving, today at 163, monitor , not actvely bleeding     Leukocytosis  Assessment & Plan  At 15.1, chest x-ray showed layering of the pleural fluid, blood and procalcitonin, UA did not show any infection.  Monitor  No antibiotics indicated    Hypoglycemia associated with diabetes (HCC)- (present on admission)  Assessment & Plan  Resolved       Pleural effusion  Assessment & Plan  pleural effusion on CT renal colic.  chest x-ray it did not show significant pleural effusion and he is maintaining ox saturation on room air.  Hold thoracentesis for now.  Continue with diuresis    Anasarca  Assessment & Plan  Remains with fluid overload upon admit despite he has been receiving p.o. Lasix 60mg.   --Low serum albumin, nephrology following, continue IV Lasix, I/os, daily weight, fluid restriction    Serum albumin decreased- (present on admission)  Assessment & Plan  Secondary renal disease  Nephrotic syndrome vs liver disease.  monitor    Obesity (BMI 35.0-39.9 without comorbidity)  Assessment & Plan  Body mass index is 35.43 kg/m².  Volume overload and should improve with diuresis.    Presence of stent in coronary artery- (present on admission)  Assessment & Plan  Continue outpatient medication therapy  Patient follows with Dr. Andrew Engel, cardiologist  He does not have symptoms of chest pain.    Hypoglycemia  Assessment & Plan  Does not need to be on any oral hypoglycemic medication surgery; clearance of oral hypoglycemic medication impaired in renal failure  Blood glucose improving    Type 2 diabetes mellitus with hyperglycemia, with long-term current use of insulin (HCC)- (present on admission)  Assessment & Plan  Patient does take Lantus along with glimepiride as an outpatient.     -- Stop glimepiride in the light of AXEL on CKD considering decreased clearance for glimepiride    -- Continuse ISS and hypoglycemia protocol, Accu-Cheks ACH S.    Likely patient would not need Lantus and  glimepiride upon discharge    Stage 3a chronic kidney disease (HCC)- (present on admission)  Assessment & Plan  He found to have chronic kidney disease most likely secondary to diabetes per; proven by biopsy   -- Nephrology following, will hold ACEI in the light of AXEL on CKD stage III      I reviewed above assessment and plan no acute changes.  Continue IV diuretics 80 mg Lasix twice daily.  I personally discussed case with nephrologist at the bedside.    Discussed plan of care with bedside RN.    VTE prophylaxis: heparin ppx

## 2022-12-03 NOTE — CARE PLAN
Problem: Pain - Standard  Goal: Alleviation of pain or a reduction in pain to the patient’s comfort goal  Outcome: Progressing     Problem: Fluid Volume  Goal: Fluid volume balance will be maintained  Outcome: Progressing     Problem: Mobility  Goal: Patient's capacity to carry out activities will improve  Outcome: Progressing   The patient is Stable - Low risk of patient condition declining or worsening    Shift Goals  Clinical Goals: diuresis, I/O's  Patient Goals: Sleep  Family Goals: JENNIFER    Progress made toward(s) clinical / shift goals:  Progressing    Patient is not progressing towards the following goals:

## 2022-12-03 NOTE — PROGRESS NOTES
Monitor Summary:   Rhythm: sr  Rate: 64-69  Measurement: .16/.39/.41  Ectopy: pvc's; 1 event of 8 Beats VT

## 2022-12-04 ENCOUNTER — PHARMACY VISIT (OUTPATIENT)
Dept: PHARMACY | Facility: MEDICAL CENTER | Age: 52
End: 2022-12-04
Payer: COMMERCIAL

## 2022-12-04 VITALS
SYSTOLIC BLOOD PRESSURE: 159 MMHG | BODY MASS INDEX: 30.64 KG/M2 | DIASTOLIC BLOOD PRESSURE: 84 MMHG | OXYGEN SATURATION: 97 % | TEMPERATURE: 98.1 F | HEART RATE: 71 BPM | WEIGHT: 226.19 LBS | RESPIRATION RATE: 16 BRPM | HEIGHT: 72 IN

## 2022-12-04 LAB
ANION GAP SERPL CALC-SCNC: 11 MMOL/L (ref 7–16)
BUN SERPL-MCNC: 34 MG/DL (ref 8–22)
CALCIUM SERPL-MCNC: 7.7 MG/DL (ref 8.5–10.5)
CHLORIDE SERPL-SCNC: 102 MMOL/L (ref 96–112)
CO2 SERPL-SCNC: 23 MMOL/L (ref 20–33)
CREAT SERPL-MCNC: 1.8 MG/DL (ref 0.5–1.4)
ERYTHROCYTE [DISTWIDTH] IN BLOOD BY AUTOMATED COUNT: 39.8 FL (ref 35.9–50)
GFR SERPLBLD CREATININE-BSD FMLA CKD-EPI: 44 ML/MIN/1.73 M 2
GLUCOSE BLD STRIP.AUTO-MCNC: 155 MG/DL (ref 65–99)
GLUCOSE BLD STRIP.AUTO-MCNC: 207 MG/DL (ref 65–99)
GLUCOSE SERPL-MCNC: 146 MG/DL (ref 65–99)
HCT VFR BLD AUTO: 31.8 % (ref 42–52)
HGB BLD-MCNC: 10.9 G/DL (ref 14–18)
MCH RBC QN AUTO: 29.9 PG (ref 27–33)
MCHC RBC AUTO-ENTMCNC: 34.3 G/DL (ref 33.7–35.3)
MCV RBC AUTO: 87.1 FL (ref 81.4–97.8)
PHOSPHATE SERPL-MCNC: 2.8 MG/DL (ref 2.5–4.5)
PLATELET # BLD AUTO: 192 K/UL (ref 164–446)
PMV BLD AUTO: 10.7 FL (ref 9–12.9)
POTASSIUM SERPL-SCNC: 3.4 MMOL/L (ref 3.6–5.5)
RBC # BLD AUTO: 3.65 M/UL (ref 4.7–6.1)
SODIUM SERPL-SCNC: 136 MMOL/L (ref 135–145)
WBC # BLD AUTO: 11.6 K/UL (ref 4.8–10.8)

## 2022-12-04 PROCEDURE — RXMED WILLOW AMBULATORY MEDICATION CHARGE: Performed by: STUDENT IN AN ORGANIZED HEALTH CARE EDUCATION/TRAINING PROGRAM

## 2022-12-04 PROCEDURE — 700102 HCHG RX REV CODE 250 W/ 637 OVERRIDE(OP): Performed by: NURSE PRACTITIONER

## 2022-12-04 PROCEDURE — 99232 SBSQ HOSP IP/OBS MODERATE 35: CPT | Performed by: INTERNAL MEDICINE

## 2022-12-04 PROCEDURE — 700102 HCHG RX REV CODE 250 W/ 637 OVERRIDE(OP): Performed by: INTERNAL MEDICINE

## 2022-12-04 PROCEDURE — 80048 BASIC METABOLIC PNL TOTAL CA: CPT

## 2022-12-04 PROCEDURE — 700111 HCHG RX REV CODE 636 W/ 250 OVERRIDE (IP): Performed by: INTERNAL MEDICINE

## 2022-12-04 PROCEDURE — 99239 HOSP IP/OBS DSCHRG MGMT >30: CPT | Performed by: STUDENT IN AN ORGANIZED HEALTH CARE EDUCATION/TRAINING PROGRAM

## 2022-12-04 PROCEDURE — A9270 NON-COVERED ITEM OR SERVICE: HCPCS | Performed by: NURSE PRACTITIONER

## 2022-12-04 PROCEDURE — 36415 COLL VENOUS BLD VENIPUNCTURE: CPT

## 2022-12-04 PROCEDURE — 82962 GLUCOSE BLOOD TEST: CPT

## 2022-12-04 PROCEDURE — A9270 NON-COVERED ITEM OR SERVICE: HCPCS | Performed by: INTERNAL MEDICINE

## 2022-12-04 PROCEDURE — 84100 ASSAY OF PHOSPHORUS: CPT

## 2022-12-04 PROCEDURE — 85027 COMPLETE CBC AUTOMATED: CPT

## 2022-12-04 RX ORDER — FUROSEMIDE 80 MG
160 TABLET ORAL
Qty: 120 TABLET | Refills: 0 | Status: SHIPPED | OUTPATIENT
Start: 2022-12-04 | End: 2023-01-03

## 2022-12-04 RX ORDER — INSULIN GLARGINE 100 [IU]/ML
10 INJECTION, SOLUTION SUBCUTANEOUS EVERY EVENING
Qty: 10 ML | Refills: 0 | Status: SHIPPED | OUTPATIENT
Start: 2022-12-04 | End: 2023-01-10

## 2022-12-04 RX ORDER — SODIUM BICARBONATE 650 MG/1
1300 TABLET ORAL 2 TIMES DAILY
Qty: 120 TABLET | Refills: 3 | Status: SHIPPED | OUTPATIENT
Start: 2022-12-04 | End: 2023-01-10

## 2022-12-04 RX ORDER — PEN NEEDLE, DIABETIC 29 G X1/2"
NEEDLE, DISPOSABLE MISCELLANEOUS
Qty: 100 EACH | Refills: 0 | Status: SHIPPED | OUTPATIENT
Start: 2022-12-04 | End: 2023-05-09

## 2022-12-04 RX ADMIN — CARVEDILOL 12.5 MG: 12.5 TABLET, FILM COATED ORAL at 07:44

## 2022-12-04 RX ADMIN — INSULIN HUMAN 3 UNITS: 100 INJECTION, SOLUTION PARENTERAL at 07:44

## 2022-12-04 RX ADMIN — INSULIN HUMAN 6 UNITS: 100 INJECTION, SOLUTION PARENTERAL at 11:20

## 2022-12-04 RX ADMIN — FUROSEMIDE 80 MG: 10 INJECTION, SOLUTION INTRAMUSCULAR; INTRAVENOUS at 05:54

## 2022-12-04 RX ADMIN — POTASSIUM CHLORIDE 20 MEQ: 1500 TABLET, EXTENDED RELEASE ORAL at 05:55

## 2022-12-04 RX ADMIN — ENALAPRIL MALEATE 20 MG: 10 TABLET ORAL at 05:55

## 2022-12-04 RX ADMIN — SODIUM BICARBONATE 1300 MG: 650 TABLET ORAL at 05:55

## 2022-12-04 RX ADMIN — CHOLESTYRAMINE 4 G: 4 POWDER, FOR SUSPENSION ORAL at 10:09

## 2022-12-04 RX ADMIN — OMEPRAZOLE 20 MG: 20 CAPSULE, DELAYED RELEASE ORAL at 05:55

## 2022-12-04 RX ADMIN — THIAMINE HCL TAB 100 MG 100 MG: 100 TAB at 05:55

## 2022-12-04 RX ADMIN — ASPIRIN 81 MG: 81 TABLET, COATED ORAL at 05:55

## 2022-12-04 ASSESSMENT — FIBROSIS 4 INDEX: FIB4 SCORE: 1.48

## 2022-12-04 ASSESSMENT — ENCOUNTER SYMPTOMS
FEVER: 0
ABDOMINAL PAIN: 0
SHORTNESS OF BREATH: 0

## 2022-12-04 NOTE — PROGRESS NOTES
Nephrology Daily Progress Note    Date of Service  12/3/2022    Chief Complaint  52 y.o. male with history of CKD of undetermined stage, from biopsy-proven diabetic nephropathy and chronic microangiopathic changes, diabetes, hypertension who presented 11/27/2022 with flank pain and lower extremity edema.    Interval Problem Update  11/30 -patient had 4 L of urine output in the last 24 hours on Lasix 80 mg IV twice daily.  Off of oxygen.  Denies chest pain, shortness of breath, but thinks his legs and stomach are still very swollen.  12/1 -urine output 7 L in the last 24 hours!  Patient denies chest pain, shortness of breath.  His abdominal/flank pain is gone, but he still has leg and abdominal swelling.  12/2 -urine output 6.7 L in the last 24 hours!  Patient denies chest pain, shortness of breath.  Still has leg and abdominal wall edema.  12/3 -urine output 4.4 L in the last 24 hours.  Patient's weight down 15 kg since admission.  Feels much lighter and better, but still has swelling.  Denies chest pain, shortness of breath.    Review of Systems  Review of Systems   Constitutional:  Negative for fever.   Respiratory:  Negative for shortness of breath.    Cardiovascular:  Positive for leg swelling. Negative for chest pain.   Gastrointestinal:  Negative for abdominal pain.   All other systems reviewed and are negative.     Physical Exam  Temp:  [36.7 °C (98.1 °F)-37.5 °C (99.5 °F)] 36.7 °C (98.1 °F)  Pulse:  [60-86] 86  Resp:  [17-18] 18  BP: (150-176)/(86-98) 170/92  SpO2:  [95 %-98 %] 97 %    Vitals:    12/03/22 0500 12/03/22 0757 12/03/22 1108 12/03/22 1646   BP:  (!) 162/89 (!) 176/98 (!) 170/92   Weight: 105 kg (231 lb 14.8 oz)      Height:             Physical Exam  Constitutional:       General: He is not in acute distress.     Appearance: He is well-developed. He is not diaphoretic.   HENT:      Head: Normocephalic and atraumatic.      Mouth/Throat:      Mouth: Mucous membranes are moist.      Pharynx:  Oropharynx is clear. No oropharyngeal exudate.   Eyes:      General: No scleral icterus.     Extraocular Movements: Extraocular movements intact.   Neck:      Trachea: No tracheal deviation.   Cardiovascular:      Rate and Rhythm: Normal rate.      Heart sounds: Normal heart sounds. No murmur heard.  Pulmonary:      Effort: Pulmonary effort is normal.      Breath sounds: Normal breath sounds. No stridor. No rales.   Abdominal:      General: There is no distension.      Palpations: Abdomen is soft.      Tenderness: There is no abdominal tenderness.   Musculoskeletal:         General: Swelling (Abdominal wall edema still noted) present. Normal range of motion.      Right lower leg: Edema (3+) present.      Left lower leg: Edema (3+) present.   Skin:     General: Skin is warm and dry.   Neurological:      General: No focal deficit present.      Mental Status: He is alert and oriented to person, place, and time.   Psychiatric:         Mood and Affect: Mood normal.         Behavior: Behavior normal.       Fluids    Intake/Output Summary (Last 24 hours) at 12/3/2022 1719  Last data filed at 12/3/2022 1706  Gross per 24 hour   Intake --   Output 4300 ml   Net -4300 ml         Laboratory  Labs reviewed, pertinent labs below.  Recent Labs     12/01/22 0018 12/02/22  0009 12/03/22  0018   WBC 13.1*  13.1* 11.8* 10.5   RBC 3.33*  3.33* 3.37* 3.51*   HEMOGLOBIN 10.1*  10.1* 10.2* 10.6*   HEMATOCRIT 29.7*  29.7* 29.4* 30.4*   MCV 89.2  89.2 87.2 86.6   MCH 30.3  30.3 30.3 30.2   MCHC 34.0  34.0 34.7 34.9   RDW 42.7  42.7 40.9 39.7   PLATELETCT 180  180 174 186   MPV 10.9  10.9 10.8 10.5       Recent Labs     12/01/22 0018 12/02/22  0009 12/03/22  0018   SODIUM 133* 134* 137   POTASSIUM 4.0 3.7 3.3*   CHLORIDE 105 103 105   CO2 19* 22 22   GLUCOSE 153* 170* 131*   BUN 41* 38* 37*   CREATININE 2.34* 2.16* 1.80*   CALCIUM 7.5* 7.6* 7.6*                 URINALYSIS:  Lab Results   Component Value Date/Time    COLORURINE  Yellow 11/27/2022 1259    CLARITY Clear 11/27/2022 1259    SPECGRAVITY 1.017 11/27/2022 1259    PHURINE 5.0 11/27/2022 1259    KETONES Negative 11/27/2022 1259    PROTEINURIN >=1000 (A) 11/27/2022 1259    BILIRUBINUR Negative 11/27/2022 1259    UROBILU 0.2 11/27/2022 1259    NITRITE Negative 11/27/2022 1259    LEUKESTERAS Negative 11/27/2022 1259    OCCULTBLOOD Moderate (A) 11/27/2022 1259     [facility]  Lab Results   Component Value Date/Time    TOTPROTUR >600.0 (H) 11/19/2022 1709      Lab Results   Component Value Date/Time    CREATININEU 81.09 11/19/2022 1709         Imaging interpreted by radiologist. Imaging reports reviewed with pertinent findings below  DX-CHEST-LIMITED (1 VIEW)   Final Result      Atelectasis within the right lung base.      DX-CHEST-PORTABLE (1 VIEW)   Final Result      Hazy opacification in the right lung is similar to the prior exam and likely represents layering pleural fluid      CT-RENAL COLIC EVALUATION(A/P W/O)   Final Result      1.  No renal or ureteral calculi. No hydronephrosis      2.   Small-to-moderate amount of ascites around the liver and spleen and extending into the pelvis      3.  Partially visualized moderate right pleural effusion with adjacent atelectasis.      4.  Diffuse subcutaneous edema is consistent with anasarca      5.  Cholelithiasis.      6.  Mild diverticulosis.      7.  Calcification pancreatic head is grossly unchanged from the prior CT.            Current Facility-Administered Medications   Medication Dose Route Frequency Provider Last Rate Last Admin    insulin regular (HumuLIN R,NovoLIN R) injection  3-15 Units Subcutaneous 4X/DAY LISA WoodOAsiya   3 Units at 12/03/22 1656    furosemide (LASIX) injection 80 mg  80 mg Intravenous BID DIURETIC Varun Ortiz, M.DAsiya   80 mg at 12/03/22 1653    sodium bicarbonate tablet 1,300 mg  1,300 mg Oral BID Varun Safdi, M.D.   1,300 mg at 12/03/22 1654    thiamine (Vitamin B-1) tablet 100 mg  100 mg Oral DAILY  Usman Hermosillo A.P.R.NAsiya   100 mg at 12/03/22 0601    Pneumococcal 20-Nilda Conj Vacc (PREVNAR 20) syringe 0.5 mL  0.5 mL Intramuscular Once PRN Abdulaziz Hawkins M.D.        influenza vaccine quad (FLUZONE/FLUARIX) injection 0.5 mL  0.5 mL Intramuscular Once PRN Abdulaziz Hawkins M.D.        HYDROmorphone (Dilaudid) injection 0.5 mg  0.5 mg Intravenous Q3HRS PRN Agustin Zhong M.D.   0.5 mg at 11/29/22 2246    senna-docusate (PERICOLACE or SENOKOT S) 8.6-50 MG per tablet 2 Tablet  2 Tablet Oral BID Karolyn Zavala M.D.   2 Tablet at 12/01/22 1659    And    polyethylene glycol/lytes (MIRALAX) PACKET 1 Packet  1 Packet Oral QDAY PRN Karloyn Zavala M.D.        And    magnesium hydroxide (MILK OF MAGNESIA) suspension 30 mL  30 mL Oral QDAY PRN Karolyn Zavala M.D.        And    bisacodyl (DULCOLAX) suppository 10 mg  10 mg Rectal QDAY PRN Karolyn Zavala M.D.        enoxaparin (Lovenox) inj 40 mg  40 mg Subcutaneous DAILY AT 1800 Karolyn Zavala M.D.   40 mg at 12/03/22 1653    acetaminophen (Tylenol) tablet 650 mg  650 mg Oral Q6HRS PRN Karolyn Zavala M.D.   650 mg at 12/02/22 0922    ondansetron (ZOFRAN) syringe/vial injection 4 mg  4 mg Intravenous Q4HRS PRN Karolyn Zavala M.D.        ondansetron (ZOFRAN ODT) dispertab 4 mg  4 mg Oral Q4HRS PRN Karolyn Zavala M.D.        promethazine (PHENERGAN) tablet 12.5-25 mg  12.5-25 mg Oral Q4HRS PRN Parr Bib Lucas, M.D.        promethazine (PHENERGAN) suppository 12.5-25 mg  12.5-25 mg Rectal Q4HRS PRN Karolyn Zavala M.D.        prochlorperazine (COMPAZINE) injection 5-10 mg  5-10 mg Intravenous Q4HRS PRN Karolyn Zavala M.D.        dextrose 10 % BOLUS 25 g  25 g Intravenous Q15 MIN PRN Karolyn Zavala M.D.   Stopped at 11/28/22 1222    oxyCODONE immediate-release (ROXICODONE) tablet 5 mg  5 mg Oral Q4HRS PRN Karolyn Zavala M.D.   5 mg at 11/30/22 0622    aspirin  EC (ECOTRIN) tablet 81 mg  81 mg Oral DAILY Karolyn Zavala M.D.   81 mg at 12/03/22 0601    atorvastatin (LIPITOR) tablet 80 mg  80 mg Oral Q EVENING Karolyn Zavala M.D.   80 mg at 12/03/22 1654    carvedilol (COREG) tablet 12.5 mg  12.5 mg Oral BID WITH MEALS Karolyn Zavala M.D.   12.5 mg at 12/03/22 1654    cholestyramine (QUESTRAN,PREVALITE) 4 GM powder 4 g  4 g Oral BID Karolyn Zavala M.D.   4 g at 12/03/22 1040    enalapril (VASOTEC) tablet 20 mg  20 mg Oral BID Karolyn Zavala M.D.   20 mg at 12/03/22 1654    omeprazole (PRILOSEC) capsule 20 mg  20 mg Oral QAM Karolyn Zavala M.D.   20 mg at 12/03/22 0601    potassium chloride SA (Kdur) tablet 20 mEq  20 mEq Oral BID Karolyn Zavala M.D.   20 mEq at 12/03/22 1654         Assessment/Plan  52 y.o. male with history of CKD of undetermined stage, from biopsy-proven diabetic nephropathy and chronic microangiopathic changes, diabetes, hypertension who presented 11/27/2022 with flank pain and lower extremity edema.     1.  AXEL on CKD 3A, versus progression of CKD.  Nonoliguric, kidney function stable. This patient has biopsy-proven diabetic nephropathy, with kidney biopsy showing 50% interstitial fibrosis and tubular atrophy in March 2022.  This suggests that the patient already had advanced CKD.  I still believe his anasarca is masking his true degree of CKD, and I worry his creatinine might worsen with ongoing diuresis.  There is no acute need for dialysis, but patient would be interested in home peritoneal dialysis or transplant if his kidneys did fail in the future.  Avoid nephrotoxins.  Check labs daily.    2.  Hypertension, uncontrolled, due to ongoing volume overload.  I recommend continue Lasix 80 mg IV twice daily.  I do not recommend increasing any other antihypertensive medications while continuing ongoing active diuresis.  Low-salt diet.    3.  Hypoglycemia, present prior to admission, improved during  this admission.  Likely due to decreased metabolism of sulfonylureas and insulin from advanced CKD.  Stable.  Defer diabetes management to primary team.    4.  Hyponatremia, improved.  Continue to control sugars.  Limit hypotonic fluids.  Check labs daily.    5.  Metabolic acidosis, controlled on sodium bicarbonate 1300 mg p.o. twice daily, continue.  Check labs daily.    6.  Hypocalcemia, mild, corrects for low albumin.  No need for calcium repletion.  Check labs daily.        Varun Ortiz MD  Nephrology  [facility]

## 2022-12-04 NOTE — PROGRESS NOTES
Nephrology Daily Progress Note    Date of Service  12/4/2022    Chief Complaint  52 y.o. male with history of CKD of undetermined stage, from biopsy-proven diabetic nephropathy and chronic microangiopathic changes, diabetes, hypertension who presented 11/27/2022 with flank pain and lower extremity edema.    Interval Problem Update  11/30 -patient had 4 L of urine output in the last 24 hours on Lasix 80 mg IV twice daily.  Off of oxygen.  Denies chest pain, shortness of breath, but thinks his legs and stomach are still very swollen.  12/1 -urine output 7 L in the last 24 hours!  Patient denies chest pain, shortness of breath.  His abdominal/flank pain is gone, but he still has leg and abdominal swelling.  12/2 -urine output 6.7 L in the last 24 hours!  Patient denies chest pain, shortness of breath.  Still has leg and abdominal wall edema.  12/3 -urine output 4.4 L in the last 24 hours.  Patient's weight down 15 kg since admission.  Feels much lighter and better, but still has swelling.  Denies chest pain, shortness of breath.  12/4 -urine output 5 L in the last 24 hours.  Weight down to 103 kg.  Patient denies chest pain, shortness of breath.  Wants to go home.  Denies uremic symptoms.    Review of Systems  Review of Systems   Constitutional:  Negative for fever.   Respiratory:  Negative for shortness of breath.    Cardiovascular:  Positive for leg swelling. Negative for chest pain.   Gastrointestinal:  Negative for abdominal pain.   All other systems reviewed and are negative.     Physical Exam  Temp:  [36.3 °C (97.3 °F)-36.9 °C (98.4 °F)] (P) 36.4 °C (97.5 °F)  Pulse:  [69-86] (P) 73  Resp:  [16-18] (P) 18  BP: (158-180)/(84-92) (P) 177/87  SpO2:  [95 %-97 %] (P) 97 %    Vitals:    12/04/22 0000 12/04/22 0400 12/04/22 0600 12/04/22 0657   BP: (!) 158/88 (!) 159/84  (!) (P) 177/87   Weight:   103 kg (226 lb 3.1 oz)    Height:             Physical Exam  Constitutional:       General: He is not in acute distress.      Appearance: He is well-developed. He is obese. He is not diaphoretic.   HENT:      Head: Normocephalic and atraumatic.      Mouth/Throat:      Mouth: Mucous membranes are moist.      Pharynx: Oropharynx is clear. No oropharyngeal exudate.   Eyes:      General: No scleral icterus.     Extraocular Movements: Extraocular movements intact.   Neck:      Trachea: No tracheal deviation.   Cardiovascular:      Rate and Rhythm: Normal rate.      Heart sounds: Normal heart sounds. No murmur heard.  Pulmonary:      Effort: Pulmonary effort is normal.      Breath sounds: Normal breath sounds. No stridor. No rales.   Abdominal:      General: There is no distension.      Palpations: Abdomen is soft.      Tenderness: There is no abdominal tenderness.   Musculoskeletal:         General: Swelling (1+ abdominal wall edema still noted) present. Normal range of motion.      Right lower leg: Edema (2-3+) present.      Left lower leg: Edema (2-3+) present.   Skin:     General: Skin is warm and dry.   Neurological:      General: No focal deficit present.      Mental Status: He is alert and oriented to person, place, and time.   Psychiatric:         Mood and Affect: Mood normal.         Behavior: Behavior normal.       Fluids    Intake/Output Summary (Last 24 hours) at 12/4/2022 1318  Last data filed at 12/4/2022 1000  Gross per 24 hour   Intake 600 ml   Output 4350 ml   Net -3750 ml         Laboratory  Labs reviewed, pertinent labs below.  Recent Labs     12/02/22  0009 12/03/22  0018 12/04/22  0048   WBC 11.8* 10.5 11.6*   RBC 3.37* 3.51* 3.65*   HEMOGLOBIN 10.2* 10.6* 10.9*   HEMATOCRIT 29.4* 30.4* 31.8*   MCV 87.2 86.6 87.1   MCH 30.3 30.2 29.9   MCHC 34.7 34.9 34.3   RDW 40.9 39.7 39.8   PLATELETCT 174 186 192   MPV 10.8 10.5 10.7       Recent Labs     12/02/22  0009 12/03/22  0018 12/04/22  0048   SODIUM 134* 137 136   POTASSIUM 3.7 3.3* 3.4*   CHLORIDE 103 105 102   CO2 22 22 23   GLUCOSE 170* 131* 146*   BUN 38* 37* 34*    CREATININE 2.16* 1.80* 1.80*   CALCIUM 7.6* 7.6* 7.7*                 URINALYSIS:  Lab Results   Component Value Date/Time    COLORURINE Yellow 11/27/2022 1259    CLARITY Clear 11/27/2022 1259    SPECGRAVITY 1.017 11/27/2022 1259    PHURINE 5.0 11/27/2022 1259    KETONES Negative 11/27/2022 1259    PROTEINURIN >=1000 (A) 11/27/2022 1259    BILIRUBINUR Negative 11/27/2022 1259    UROBILU 0.2 11/27/2022 1259    NITRITE Negative 11/27/2022 1259    LEUKESTERAS Negative 11/27/2022 1259    OCCULTBLOOD Moderate (A) 11/27/2022 1259     [facility]  Lab Results   Component Value Date/Time    TOTPROTUR >600.0 (H) 11/19/2022 1709      Lab Results   Component Value Date/Time    CREATININEU 81.09 11/19/2022 1709         Imaging interpreted by radiologist. Imaging reports reviewed with pertinent findings below  DX-CHEST-LIMITED (1 VIEW)   Final Result      Atelectasis within the right lung base.      DX-CHEST-PORTABLE (1 VIEW)   Final Result      Hazy opacification in the right lung is similar to the prior exam and likely represents layering pleural fluid      CT-RENAL COLIC EVALUATION(A/P W/O)   Final Result      1.  No renal or ureteral calculi. No hydronephrosis      2.   Small-to-moderate amount of ascites around the liver and spleen and extending into the pelvis      3.  Partially visualized moderate right pleural effusion with adjacent atelectasis.      4.  Diffuse subcutaneous edema is consistent with anasarca      5.  Cholelithiasis.      6.  Mild diverticulosis.      7.  Calcification pancreatic head is grossly unchanged from the prior CT.            Current Facility-Administered Medications   Medication Dose Route Frequency Provider Last Rate Last Admin    insulin regular (HumuLIN R,NovoLIN R) injection  3-15 Units Subcutaneous 4X/DAY JOHN Antunez D.O.   6 Units at 12/04/22 1120    furosemide (LASIX) injection 80 mg  80 mg Intravenous BID DIURETIC Varun Safdi, M.D.   80 mg at 12/04/22 0554    sodium bicarbonate tablet  1,300 mg  1,300 mg Oral BID Varun Ortiz M.D.   1,300 mg at 12/04/22 0555    thiamine (Vitamin B-1) tablet 100 mg  100 mg Oral DAILY ELEAZAR Jaquez   100 mg at 12/04/22 0555    Pneumococcal 20-Nilda Conj Vacc (PREVNAR 20) syringe 0.5 mL  0.5 mL Intramuscular Once PRN Abdulaziz Hawkins M.D.        influenza vaccine quad (FLUZONE/FLUARIX) injection 0.5 mL  0.5 mL Intramuscular Once PRN Abdulaziz Hawkins M.D.        HYDROmorphone (Dilaudid) injection 0.5 mg  0.5 mg Intravenous Q3HRS PRN Agustin Zhong M.D.   0.5 mg at 11/29/22 2246    senna-docusate (PERICOLACE or SENOKOT S) 8.6-50 MG per tablet 2 Tablet  2 Tablet Oral BID Karolyn Zavala M.D.   2 Tablet at 12/01/22 1659    And    polyethylene glycol/lytes (MIRALAX) PACKET 1 Packet  1 Packet Oral QDAY PRN Karolyn Zavala M.D.        And    magnesium hydroxide (MILK OF MAGNESIA) suspension 30 mL  30 mL Oral QDAY PRN Karolyn Zavala M.D.        And    bisacodyl (DULCOLAX) suppository 10 mg  10 mg Rectal QDAY PRN Karolyn Zavala M.D.        enoxaparin (Lovenox) inj 40 mg  40 mg Subcutaneous DAILY AT 1800 Karolyn Zavala M.D.   40 mg at 12/03/22 1653    acetaminophen (Tylenol) tablet 650 mg  650 mg Oral Q6HRS PRN Karolyn Zavala M.D.   650 mg at 12/02/22 0922    ondansetron (ZOFRAN) syringe/vial injection 4 mg  4 mg Intravenous Q4HRS PRN Karolyn Zavala M.D.        ondansetron (ZOFRAN ODT) dispertab 4 mg  4 mg Oral Q4HRS PRN Karolyn Zavala M.D.        promethazine (PHENERGAN) tablet 12.5-25 mg  12.5-25 mg Oral Q4HRS PRN Karolyn Zavala M.D.        promethazine (PHENERGAN) suppository 12.5-25 mg  12.5-25 mg Rectal Q4HRS PRN Karolyn Zavala M.D.        prochlorperazine (COMPAZINE) injection 5-10 mg  5-10 mg Intravenous Q4HRS PRN Karolyn Zavala M.D.        dextrose 10 % BOLUS 25 g  25 g Intravenous Q15 MIN PRN Karolyn Zavala M.D.   Stopped at 11/28/22 2089     oxyCODONE immediate-release (ROXICODONE) tablet 5 mg  5 mg Oral Q4HRS PRN Karolyn Zavala M.D.   5 mg at 11/30/22 0622    aspirin EC (ECOTRIN) tablet 81 mg  81 mg Oral DAILY Karolyn Zavala M.D.   81 mg at 12/04/22 0555    atorvastatin (LIPITOR) tablet 80 mg  80 mg Oral Q EVENING Karolyn Zavala M.D.   80 mg at 12/03/22 1654    carvedilol (COREG) tablet 12.5 mg  12.5 mg Oral BID WITH MEALS Karolyn Zavala M.D.   12.5 mg at 12/04/22 0744    cholestyramine (QUESTRAN,PREVALITE) 4 GM powder 4 g  4 g Oral BID Karolyn Zavala M.D.   4 g at 12/04/22 1009    enalapril (VASOTEC) tablet 20 mg  20 mg Oral BID Karolyn Zavala M.D.   20 mg at 12/04/22 0555    omeprazole (PRILOSEC) capsule 20 mg  20 mg Oral QAM Karolyn Zavala M.D.   20 mg at 12/04/22 0555    potassium chloride SA (Kdur) tablet 20 mEq  20 mEq Oral BID Karolyn Zavala M.D.   20 mEq at 12/04/22 0555         Assessment/Plan  52 y.o. male with history of CKD of undetermined stage, from biopsy-proven diabetic nephropathy and chronic microangiopathic changes, diabetes, hypertension who presented 11/27/2022 with flank pain and lower extremity edema.     1.  AXEL on CKD 3A, versus progression of CKD.  Nonoliguric, kidney function remains stable, which is surprising.  I had suspected his creatinine would worsen with diuresis, but it has held stable.  Patient has biopsy-proven diabetic nephropathy, as well as chronic microangiopathic changes, likely from anti-VEGF ocular injections.  Patient is very worried about going blind from diabetic retinopathy, and says he would accept the risk of ocular injections, would accept kidney failure and dialysis rather than go blind.  This patient is already at high risk of progression to ESRD given his nephrotic range proteinuria.  The patient thinks he would prefer transplant or home peritoneal dialysis if he does develop ESRD.  Avoid nephrotoxins.  Check labs daily while  inpatient.    2.  Hypertension, uncontrolled, due to ongoing volume overload.  Continue Lasix 80 mg IV twice daily.  On discharge, I recommend patient take Lasix 160 mg p.o. twice daily until he gets symptoms of lightheadedness, or muscle cramps, at which point he should take Lasix 160 mg once daily for maintenance.  I recommend that the patient weigh himself daily, I suspect he will be able to achieve a weight less than 100 kg.  Low-salt diet.  Continue enalapril.    3.  Hypoglycemia, now improved.  Likely was due to decreased metabolism of glimepiride.  Consider alternate sulfonylurea versus fast acting insulin.  Defer diabetes management to primary team.    4.  Hyponatremia, resolved.  Continue to control sugars.  Limit hypotonic fluids.  Check labs daily while inpatient.    5.  Metabolic acidosis, controlled on sodium bicarbonate 1300 mg p.o. twice daily, continue this on discharge.  Check labs daily while inpatient.    6.  Hypocalcemia, mild, corrects to normal for low albumin.  No need for calcium repletion.  Check labs daily while inpatient.    Okay for discharge from a nephrology standpoint.  Discussed with Dr. Indra Ortiz MD  Nephrology  [facility]

## 2022-12-04 NOTE — DISCHARGE SUMMARY
Discharge Summary    CHIEF COMPLAINT ON ADMISSION  Chief Complaint   Patient presents with    Abdominal Pain    Flank Pain     Pt BIB EMS, report that pt has bilat flank/Abd pain. Pt reports recurrent complaint, hx of Kidney stones.          Reason for Admission  ems     Admission Date  11/27/2022    CODE STATUS  Full Code    HPI & HOSPITAL COURSE   This is  a 52-year-old male with a past medical history significant for type 2 diabetes mellitus with glyco of 5.5 , hyperlipidemia, CKD, hypertension presented to ER on 11/27/2022 with a complaint of abdominal pain and flank pain.    CT renal concerning for right pleural effusion, noted to have severe hypoglycemia with a blood glucose of 31.  During the stay in the hospital, nephrology was consulted, nephrology started the patient on IV Lasix 80 mg bid    Interval events:  -- No acute events overnight, medicine has been stable, heart rate 66-70, blood pressure 161/85 and 92% on room air.  Patient is alert, awake, answering questions appropriately, he is noted to have 2+ pitting edema in his bilateral lower extremity.  --He is noted to have biopsy-proven diabetic nephropathy  --Nephrology, will continue IV Lasix as per nephrology recommendation.    Plan of care has been discussed the patient, nursing staff, case management.    Patient was aggressively diuresed and his lower extremity edema has significantly improved.  He will discharge home on Lasix 160 mg twice daily until he gets symptoms of lightheadedness, muscle cramps I which point he will switch to Lasix 160 mg once daily as per nephrology    Therefore, he is discharged in good and stable condition to home with close outpatient follow-up.    The patient met 2-midnight criteria for an inpatient stay at the time of discharge.    Discharge Date  12/4/22     FOLLOW UP ITEMS POST DISCHARGE  Fluid overload-take Lasix 60 mg twice daily until symptomatic at which time switch to 160 mg daily    DISCHARGE  "DIAGNOSES  Principal Problem:    Fluid overload POA: Yes  Active Problems:    Stage 3a chronic kidney disease (HCC) POA: Yes    Type 2 diabetes mellitus with hyperglycemia, with long-term current use of insulin (HCC) (Chronic) POA: Yes    Hypoglycemia POA: Unknown    Presence of stent in coronary artery POA: Yes    Obesity (BMI 35.0-39.9 without comorbidity) POA: Unknown    Serum albumin decreased POA: Yes    Anasarca POA: Unknown    Pleural effusion POA: Unknown    Hypoglycemia associated with diabetes (HCC) POA: Yes    Leukocytosis POA: Unknown    Thrombocytopenia (HCC) POA: Unknown  Resolved Problems:    * No resolved hospital problems. *      FOLLOW UP  Future Appointments   Date Time Provider Department Center   12/7/2022  8:15 AM Rl Engel M.D. [facility] None   12/13/2022 11:40 AM Florinda Pascual M.D. [facility]     No follow-up provider specified.    MEDICATIONS ON DISCHARGE     Medication List        START taking these medications        Instructions   BD Insulin Syringe U/F 30G X 1/2\" 0.3 ML Misc  Generic drug: Insulin Syringe-Needle U-100   Doctor's comments: Per formulary preference. ICD-10 code: E11.9 Controlled type 2 Diabetes Mellitus  Use one syringe to inject insulin three times daily.  (Use one syringe to inject insulin three times daily.)     furosemide 80 MG Tabs  Commonly known as: LASIX   Take 2 Tablets by mouth 2 times a day for 30 days.  Dose: 160 mg     insulin regular 100 Unit/mL Soln  Commonly known as: HumuLIN R   Inject 5 Units under the skin 3 times a day before meals.  Dose: 5 Units     sodium bicarbonate 650 MG Tabs  Commonly known as: SODIUM BICARBONATE   Take 2 Tablets by mouth 2 times a day.  Dose: 1,300 mg            CHANGE how you take these medications        Instructions   insulin glargine 100 UNIT/ML Soln  What changed:   how much to take  additional instructions  Commonly known as: Lantus   Inject 10 Units under the skin every evening.  (Inject 10 Units under the skin " every evening.)  Dose: 10 Units            CONTINUE taking these medications        Instructions   aspirin EC 81 MG Tbec  Commonly known as: ECOTRIN   Take 81 mg by mouth every day.  Dose: 81 mg     atorvastatin 80 MG tablet  Commonly known as: LIPITOR   Take 80 mg by mouth every evening.  Dose: 80 mg     carvedilol 12.5 MG Tabs  Commonly known as: COREG   Take 1 Tablet by mouth 2 times a day with meals. Please call [phone] to schedule a follow up for future refills. Thank you  Dose: 12.5 mg     cholestyramine 4 GM/DOSE powder  Commonly known as: QUESTRAN   Take 4 g by mouth 2 times a day. Mixed with 4-6 oz water.  Pt takes at 1000 and 1700.   Hold all other PO medications an hour before and 4 hours after dosing.  Dose: 4 g     enalapril 20 MG tablet  Commonly known as: VASOTEC   Take 1 Tablet by mouth 2 times a day. Please call [phone] to schedule a follow up for future refills. Thank you  Dose: 20 mg     Gas-X Extra Strength 125 MG Caps  Generic drug: Simethicone   Take 250 mg by mouth 1 time a day as needed (Gas). 2 capsules = 250 mg.  Dose: 250 mg     omeprazole 20 MG delayed-release capsule  Commonly known as: PRILOSEC   Take 1 Capsule by mouth every morning.  Dose: 20 mg     Pen Needles 31G X 5 MM Misc   Inject 1 Application under the skin every day.  Dose: 1 Application     potassium chloride SA 20 MEQ Tbcr  Commonly known as: Kdur   Take 1 Tablet by mouth 2 times a day.  Dose: 20 mEq            STOP taking these medications      amLODIPine 5 MG Tabs  Commonly known as: NORVASC     Trulicity 1.5 MG/0.5ML Sopn  Generic drug: Dulaglutide              Allergies  No Known Allergies    DIET  Orders Placed This Encounter   Procedures    Diet Order Diet: Consistent CHO (Diabetic); Second Modifier: (optional): Renal     Standing Status:   Standing     Number of Occurrences:   1     Order Specific Question:   Diet:     Answer:   Consistent CHO (Diabetic) [4]     Order Specific Question:   Second Modifier:  (optional)     Answer:   Renal [8]       ACTIVITY  As tolerated.  Weight bearing as tolerated    CONSULTATIONS  Nephrology-Dr Ortiz    PROCEDURES  none    LABORATORY  Lab Results   Component Value Date    SODIUM 136 12/04/2022    POTASSIUM 3.4 (L) 12/04/2022    CHLORIDE 102 12/04/2022    CO2 23 12/04/2022    GLUCOSE 146 (H) 12/04/2022    BUN 34 (H) 12/04/2022    CREATININE 1.80 (H) 12/04/2022        Lab Results   Component Value Date    WBC 11.6 (H) 12/04/2022    HEMOGLOBIN 10.9 (L) 12/04/2022    HEMATOCRIT 31.8 (L) 12/04/2022    PLATELETCT 192 12/04/2022        Total time of the discharge process exceeds 34 minutes.

## 2022-12-05 ENCOUNTER — PATIENT OUTREACH (OUTPATIENT)
Dept: MEDICAL GROUP | Facility: PHYSICIAN GROUP | Age: 52
End: 2022-12-05
Payer: MEDICARE

## 2022-12-06 NOTE — PROGRESS NOTES
Subjective:     Beni Moore is a 52 y.o. male who presents for Hospital Follow-up.  Patient is here for hospital follow-up.  Patient did see cardiology apparently he is doing well he is not gaining any more fluid he is currently taking his Lasix.  Patient does have a follow-up appointment with nephrology January 10.  Patient states since his discharge his blood sugars have been elevated he is taking 10 units of Lantus in the evening and is taking regular insulin 5 units if his blood sugar is around 150.  Patient knows that his fingersticks his blood sugar is higher so if its above 200 he is giving himself 10 units.  Other than trying to get his blood sugars under better control he feels like he is doing well    POST DISCHARGE CALL:  Discharge Date:12/4/2022   Date of Outreach Call: 12/5/2022  4:14 PM  Now that you're home, how are you doing? Very Good  Did you receive any new prescriptions? Yes  Were you able to fill those medications? Yes  How did you fill those prescriptions? [facility]s to Beds  Pharmacy  If not able to fill prescriptions, why? *    Do you have questions about your medications? No  Do you have a follow-up appointment scheduled?Yes  Any issues or paperwork you wish to discuss with your PCP? none  Does patient qualify for CCM Program? Yes  If patient qualifies, was CCM Program Introduced? Yes  If patient does not qualify, comment? *  Number of Attempts: 2  Current or previous attempts completed within two business days of discharge? Yes  Provided education regarding treatment plan, medication, self-management, ADLs? Yes  Has patient completed Advance Directive? If yes, advise them to bring to appointment. No  Care Manager phone number provided? Yes  Is there anything else I can help you with? No  Chief Complaint? Abdominal pain; Flank pain  Admitting Dx? ems  Discharge Diagnosis? Fluid overload  Additional Comments? *    HPI:   Recently hospitalized for patient was admitted due to heart disease  "hypoglycemia and fluid overload.  Patient had gained more than 50 pounds and was diuresed.    Current medicines (including reconciliation performed today)  Current Outpatient Medications   Medication Sig Dispense Refill    enalapril (VASOTEC) 20 MG tablet Take 1 Tablet by mouth 2 times a day. 180 Tablet 3    carvedilol (COREG) 12.5 MG Tab Take 1 Tablet by mouth 2 times a day with meals. 180 Tablet 3    sodium bicarbonate (SODIUM BICARBONATE) 650 MG Tab Take 2 Tablets by mouth 2 times a day. 120 Tablet 3    insulin glargine 100 UNIT/ML SC SOLN Inject 10 Units under the skin every evening. 10 mL 0    insulin regular (HUMULIN R) 100 Unit/mL Solution Inject 5 Units under the skin 3 times a day before meals. 10 mL 2    Insulin Syringe-Needle U-100 (BD INSULIN SYRINGE U/F) 30G X 1/2\" 0.3 ML Misc Use one syringe to inject insulin three times daily. 100 Each 0    furosemide (LASIX) 80 MG Tab Take 2 Tablets by mouth 2 times a day for 30 days. 120 Tablet 0    aspirin EC (ECOTRIN) 81 MG Tablet Delayed Response Take 81 mg by mouth every day.      atorvastatin (LIPITOR) 80 MG tablet Take 80 mg by mouth every evening.      potassium chloride SA (KDUR) 20 MEQ Tab CR Take 1 Tablet by mouth 2 times a day. 60 Tablet 0    Insulin Pen Needle (PEN NEEDLES) 31G X 5 MM Misc Inject 1 Application under the skin every day. 90 Each 2    cholestyramine (QUESTRAN) 4 GM/DOSE powder Take 4 g by mouth 2 times a day. Mixed with 4-6 oz water.  Pt takes at 1000 and 1700.   Hold all other PO medications an hour before and 4 hours after dosing.      omeprazole (PRILOSEC) 20 MG delayed-release capsule Take 1 Capsule by mouth every morning.       No current facility-administered medications for this visit.       Allergies:   Patient has no known allergies.    Social History     Tobacco Use    Smoking status: Former     Packs/day: 0.50     Years: 10.00     Pack years: 5.00     Types: Cigarettes     Quit date:      Years since quittin.9    " Smokeless tobacco: Never   Vaping Use    Vaping Use: Never used   Substance Use Topics    Alcohol use: No    Drug use: Not Currently     Types: Marijuana, Oral     Comment: CBD Edibles 3-4 times per week       ROS:  Patient states he is doing well otherwise denies any diarrhea or shortness of breath or chest pain    Objective:     Vitals:    12/13/22 1129   BP: 128/86   BP Location: Left arm   Patient Position: Sitting   BP Cuff Size: Adult   Pulse: 73   Resp: 18   Temp: 36.2 °C (97.1 °F)   TempSrc: Temporal   SpO2: 99%   Weight: 104 kg (230 lb 3.2 oz)   Height: 1.829 m (6')     Body mass index is 31.22 kg/m².    Physical Exam:  Appearance patient in no acute distress vital signs appear with to be within normal limits.  Eyes sclera within normal limits  Lungs are clear  Heart regular rate no murmur noted    Assessment and Plan:   1. Type 2 diabetes mellitus with hyperglycemia, with long-term current use of insulin (HCC)  I am concerned about his diabetes will write a urgent referral to endocrinology.  To help him better manage his diabetes.  Recommended he increase his Lantus to only 12 units at this time.  Patient to continue doing his short acting insulin with the sliding scale he is using right now.  This is a chronic problem  2. Primary hypertension  Blood pressure looks good we will continue to monitor this this is a chronic problem    3. Localized swelling of both lower legs  Patient just saw cardiology and they feel at this time his swelling has gone down since his hospitalization.  Patient is also seeing nephrology on 10 January for follow-up certain about his kidney function.- Chart and discharge summary were reviewed.   - Hospitalization and results reviewed with patient.   - Medications reviewed including instructions regarding high risk medications, dosing and side effects.  - Recommended Services: No services needed at this time  - Advance directive/POLST on file?  No     Follow-up:Return in about 4  weeks (around 1/10/2023), or if symptoms worsen or fail to improve.    Face-to-face transitional care management services with MODERATE (today's visit is within 14 days post discharge & LACE+ score of 28-58) medical decision complexity were provided.     LACE+ Historical Score Over Time (0-28: Low, 29-58: Medium, 59+: High): 78

## 2022-12-07 ENCOUNTER — OFFICE VISIT (OUTPATIENT)
Dept: CARDIOLOGY | Facility: MEDICAL CENTER | Age: 52
End: 2022-12-07
Payer: MEDICARE

## 2022-12-07 VITALS
DIASTOLIC BLOOD PRESSURE: 70 MMHG | HEIGHT: 72 IN | RESPIRATION RATE: 16 BRPM | SYSTOLIC BLOOD PRESSURE: 112 MMHG | BODY MASS INDEX: 31.02 KG/M2 | OXYGEN SATURATION: 98 % | WEIGHT: 229 LBS | HEART RATE: 76 BPM

## 2022-12-07 DIAGNOSIS — Z89.431 S/P TRANSMETATARSAL AMPUTATION OF FOOT, RIGHT (HCC): ICD-10-CM

## 2022-12-07 DIAGNOSIS — Z79.4 TYPE 2 DIABETES MELLITUS WITH HYPERGLYCEMIA, WITH LONG-TERM CURRENT USE OF INSULIN (HCC): Chronic | ICD-10-CM

## 2022-12-07 DIAGNOSIS — E11.65 TYPE 2 DIABETES MELLITUS WITH HYPERGLYCEMIA, WITH LONG-TERM CURRENT USE OF INSULIN (HCC): Chronic | ICD-10-CM

## 2022-12-07 DIAGNOSIS — K59.1 FUNCTIONAL DIARRHEA: ICD-10-CM

## 2022-12-07 DIAGNOSIS — I25.10 CORONARY ARTERY DISEASE INVOLVING NATIVE CORONARY ARTERY OF NATIVE HEART WITHOUT ANGINA PECTORIS: ICD-10-CM

## 2022-12-07 DIAGNOSIS — E87.79 OTHER HYPERVOLEMIA: ICD-10-CM

## 2022-12-07 DIAGNOSIS — I25.2 HISTORY OF NON-ST ELEVATION MYOCARDIAL INFARCTION (NSTEMI): ICD-10-CM

## 2022-12-07 DIAGNOSIS — E16.2 HYPOGLYCEMIA: ICD-10-CM

## 2022-12-07 DIAGNOSIS — N28.9 RENAL INSUFFICIENCY: ICD-10-CM

## 2022-12-07 DIAGNOSIS — N18.31 STAGE 3A CHRONIC KIDNEY DISEASE: ICD-10-CM

## 2022-12-07 DIAGNOSIS — L08.9 DIABETIC FOOT INFECTION (HCC): ICD-10-CM

## 2022-12-07 DIAGNOSIS — E88.09 SERUM ALBUMIN DECREASED: ICD-10-CM

## 2022-12-07 DIAGNOSIS — Z95.5 PRESENCE OF STENT IN CORONARY ARTERY: ICD-10-CM

## 2022-12-07 DIAGNOSIS — E11.628 DIABETIC FOOT INFECTION (HCC): ICD-10-CM

## 2022-12-07 DIAGNOSIS — E66.01 MORBID (SEVERE) OBESITY DUE TO EXCESS CALORIES (HCC): ICD-10-CM

## 2022-12-07 DIAGNOSIS — E66.9 OBESITY (BMI 35.0-39.9 WITHOUT COMORBIDITY): ICD-10-CM

## 2022-12-07 DIAGNOSIS — U07.1 COVID-19: ICD-10-CM

## 2022-12-07 DIAGNOSIS — I21.4 NSTEMI (NON-ST ELEVATED MYOCARDIAL INFARCTION) (HCC): ICD-10-CM

## 2022-12-07 DIAGNOSIS — D64.9 NORMOCYTIC ANEMIA: ICD-10-CM

## 2022-12-07 DIAGNOSIS — R55 SYNCOPE AND COLLAPSE: ICD-10-CM

## 2022-12-07 DIAGNOSIS — I50.9 ACUTE HEART FAILURE, UNSPECIFIED HEART FAILURE TYPE (HCC): ICD-10-CM

## 2022-12-07 DIAGNOSIS — R79.89 TROPONIN I ABOVE REFERENCE RANGE: ICD-10-CM

## 2022-12-07 DIAGNOSIS — I10 PRIMARY HYPERTENSION: Chronic | ICD-10-CM

## 2022-12-07 DIAGNOSIS — R22.43 LOCALIZED SWELLING OF BOTH LOWER LEGS: ICD-10-CM

## 2022-12-07 PROCEDURE — 99214 OFFICE O/P EST MOD 30 MIN: CPT | Performed by: INTERNAL MEDICINE

## 2022-12-07 RX ORDER — ENALAPRIL MALEATE 20 MG/1
20 TABLET ORAL 2 TIMES DAILY
Qty: 180 TABLET | Refills: 3 | Status: SHIPPED | OUTPATIENT
Start: 2022-12-07 | End: 2023-01-10

## 2022-12-07 RX ORDER — CARVEDILOL 12.5 MG/1
12.5 TABLET ORAL 2 TIMES DAILY WITH MEALS
Qty: 180 TABLET | Refills: 3 | Status: SHIPPED | OUTPATIENT
Start: 2022-12-07 | End: 2022-12-07 | Stop reason: SDUPTHER

## 2022-12-07 RX ORDER — CARVEDILOL 12.5 MG/1
12.5 TABLET ORAL 2 TIMES DAILY WITH MEALS
Qty: 180 TABLET | Refills: 3 | Status: SHIPPED | OUTPATIENT
Start: 2022-12-07 | End: 2023-01-10

## 2022-12-07 ASSESSMENT — MINNESOTA LIVING WITH HEART FAILURE QUESTIONNAIRE (MLHF)
MAKING YOU STAY IN A HOSPITAL: 2
DIFFICULTY WITH SEXUAL ACTIVITIES: 2
DIFFICULTY TO CONCENTRATE OR REMEMBERING THINGS: 0
HAVING TO SIT OR LIE DOWN DURING THE DAY: 3
DIFFICULTY WITH RECREATIONAL PASTIMES, SPORTS, HOBBIES: 4
MAKING YOU WORRY: 1
TIRED, FATIGUED OR LOW ON ENERGY: 4
FEELING LIKE A BURDEN TO FAMILY AND FRIENDS: 2
WALKING ABOUT OR CLIMBING STAIRS DIFFICULT: 3
TOTAL_SCORE: 42
LOSS OF SELF CONTROL IN YOUR LIFE: 1
DIFFICULTY WORKING TO EARN A LIVING: 2
DIFFICULTY SOCIALIZING WITH FAMILY OR FRIENDS: 2
DIFFICULTY GOING AWAY FROM HOME: 2
SWELLING IN ANKLES OR LEGS: 3
COSTING YOU MONEY FOR MEDICAL CARE: 2
MAKING YOU SHORT OF BREATH: 3
EATING LESS FOODS YOU LIKE: 4
GIVING YOU SIDE EFFECTS FROM TREATMENTS: 0
DIFFICULTY SLEEPING WELL AT NIGHT: 0
WORKING AROUND THE HOUSE OR YARD DIFFICULT: 2
MAKING YOU FEEL DEPRESSED: 0

## 2022-12-07 ASSESSMENT — ENCOUNTER SYMPTOMS
CLAUDICATION: 0
BRUISES/BLEEDS EASILY: 0
CONSTITUTIONAL NEGATIVE: 1
PND: 0
DIZZINESS: 0
WEAKNESS: 0
WHEEZING: 0
STRIDOR: 0
NEUROLOGICAL NEGATIVE: 1
SHORTNESS OF BREATH: 0
MUSCULOSKELETAL NEGATIVE: 1
SORE THROAT: 0
ORTHOPNEA: 0
HEMOPTYSIS: 0
LOSS OF CONSCIOUSNESS: 0
CHILLS: 0
CARDIOVASCULAR NEGATIVE: 1
FEVER: 0
PALPITATIONS: 0
RESPIRATORY NEGATIVE: 1
GASTROINTESTINAL NEGATIVE: 1
COUGH: 0
EYES NEGATIVE: 1
SPUTUM PRODUCTION: 0

## 2022-12-07 ASSESSMENT — FIBROSIS 4 INDEX: FIB4 SCORE: 1.48

## 2022-12-07 NOTE — PROGRESS NOTES
Chief Complaint   Patient presents with    Hypertension     F/V Dx: Primary hypertension    CHF (Acute)     F/V Dx: Acute heart failure, unspecified heart failure type (HCC)    MI (Non ST Segment Elevation MI)       Subjective:   Beni Moore is a 51 y.o. male who presents today as a follow-up for his CAD status post stent with hypertension hyperlipidemia type 2 diabetes.      Since he was last seen he is euvolemic.  He continues on the Lasix.  His creatinine is stable at 1.8.  His blood pressure is now better controlled on the enalapril.  He is otherwise feeling well.  He has follow-up with his nephrologist in January.    Past Medical History:   Diagnosis Date    Anesthesia     Hypotension post-op, persisted for a few months    Bowel habit changes     History GI problems    Cataract     IOL - Left eye    Diabetes (HCC)     Oral medications & insulin    Heart burn     GERD; takes Prilosec    Hemorrhagic disorder (HCC)     ASA protection for stent and cardiac history    Hx of heart artery stent 2019    Hyperlipidemia     Hypertension      Past Surgical History:   Procedure Laterality Date    TOE AMPUTATION Left 9/30/2021    Procedure: AMPUTATION, TOE;  Surgeon: Tomer Hart M.D.;  Location: [facility];  Service: Orthopedics    STENT PLACEMENT  2019    STEMI with nonobstructive LAD    TOE AMPUTATION Right 7/2/2018    Procedure: Transmetatarsal amputation;  Surgeon: Ravi Bernardo M.D.;  Location: [facility];  Service: Orthopedics    ACHILLES TENDON REPAIR Right 7/2/2018    Procedure: ACHILLES LENGTHENING;  Surgeon: Ravi Bernardo M.D.;  Location: [facility];  Service: Orthopedics    IRRIGATION & DEBRIDEMENT ORTHO Right 7/2/2018    Procedure: IRRIGATION & DEBRIDEMENT ORTHO;  Surgeon: Ravi Bernardo M.D.;  Location: [facility];  Service: Orthopedics    OTHER Left     IOL    OTHER ABDOMINAL SURGERY      OTHER ORTHOPEDIC SURGERY      TONSILLECTOMY      VASECTOMY        Family History   Problem Relation Age of Onset    No Known Problems Mother     Diabetes Father     Heart Disease Father     Diabetes Maternal Grandmother     Heart Disease Maternal Grandfather     Lung Disease Paternal Grandmother     Cancer Paternal Grandmother     Cancer Paternal Grandfather     Lung Cancer Paternal Grandfather     Kidney Disease Neg Hx      Social History     Socioeconomic History    Marital status:      Spouse name: Not on file    Number of children: Not on file    Years of education: Not on file    Highest education level: Some college, no degree   Occupational History    Not on file   Tobacco Use    Smoking status: Former     Packs/day: 0.50     Years: 10.00     Pack years: 5.00     Types: Cigarettes     Quit date:      Years since quittin.9    Smokeless tobacco: Never   Vaping Use    Vaping Use: Never used   Substance and Sexual Activity    Alcohol use: No    Drug use: Not Currently     Types: Marijuana, Oral     Comment: CBD Edibles 3-4 times per week    Sexual activity: Not Currently     Partners: Female   Other Topics Concern    Not on file   Social History Narrative    Not on file     Social Determinants of Health     Financial Resource Strain: Low Risk     Difficulty of Paying Living Expenses: Not very hard   Food Insecurity: No Food Insecurity    Worried About Running Out of Food in the Last Year: Never true    Ran Out of Food in the Last Year: Never true   Transportation Needs: No Transportation Needs    Lack of Transportation (Medical): No    Lack of Transportation (Non-Medical): No   Physical Activity: Unknown    Days of Exercise per Week: Patient refused    Minutes of Exercise per Session: Patient refused   Stress: No Stress Concern Present    Feeling of Stress : Not at all   Social Connections: Moderately Integrated    Frequency of Communication with Friends and Family: More than three times a week    Frequency of Social Gatherings with Friends and Family:  "Twice a week    Attends Zoroastrianism Services: 1 to 4 times per year    Active Member of Clubs or Organizations: No    Attends Club or Organization Meetings: Never    Marital Status:    Intimate Partner Violence: Not on file   Housing Stability: Low Risk     Unable to Pay for Housing in the Last Year: No    Number of Places Lived in the Last Year: 1    Unstable Housing in the Last Year: No     No Known Allergies  Outpatient Encounter Medications as of 12/7/2022   Medication Sig Dispense Refill    enalapril (VASOTEC) 20 MG tablet Take 1 Tablet by mouth 2 times a day. 180 Tablet 3    carvedilol (COREG) 12.5 MG Tab Take 1 Tablet by mouth 2 times a day with meals. 180 Tablet 3    sodium bicarbonate (SODIUM BICARBONATE) 650 MG Tab Take 2 Tablets by mouth 2 times a day. 120 Tablet 3    insulin glargine 100 UNIT/ML SC SOLN Inject 10 Units under the skin every evening. 10 mL 0    insulin regular (HUMULIN R) 100 Unit/mL Solution Inject 5 Units under the skin 3 times a day before meals. 10 mL 2    Insulin Syringe-Needle U-100 (BD INSULIN SYRINGE U/F) 30G X 1/2\" 0.3 ML Misc Use one syringe to inject insulin three times daily. 100 Each 0    furosemide (LASIX) 80 MG Tab Take 2 Tablets by mouth 2 times a day for 30 days. 120 Tablet 0    aspirin EC (ECOTRIN) 81 MG Tablet Delayed Response Take 81 mg by mouth every day.      atorvastatin (LIPITOR) 80 MG tablet Take 80 mg by mouth every evening.      potassium chloride SA (KDUR) 20 MEQ Tab CR Take 1 Tablet by mouth 2 times a day. 60 Tablet 0    Insulin Pen Needle (PEN NEEDLES) 31G X 5 MM Misc Inject 1 Application under the skin every day. 90 Each 2    cholestyramine (QUESTRAN) 4 GM/DOSE powder Take 4 g by mouth 2 times a day. Mixed with 4-6 oz water.  Pt takes at 1000 and 1700.   Hold all other PO medications an hour before and 4 hours after dosing.      omeprazole (PRILOSEC) 20 MG delayed-release capsule Take 1 Capsule by mouth every morning.      [DISCONTINUED] carvedilol " (COREG) 12.5 MG Tab Take 1 Tablet by mouth 2 times a day with meals. 180 Tablet 3    Simethicone (GAS-X EXTRA STRENGTH) 125 MG Cap Take 250 mg by mouth 1 time a day as needed (Gas). 2 capsules = 250 mg.      [DISCONTINUED] enalapril (VASOTEC) 20 MG tablet Take 1 Tablet by mouth 2 times a day. Please call [phone] to schedule a follow up for future refills. Thank you 180 Tablet 0    [DISCONTINUED] carvedilol (COREG) 12.5 MG Tab Take 1 Tablet by mouth 2 times a day with meals. Please call [phone] to schedule a follow up for future refills. Thank you 180 Tablet 0     No facility-administered encounter medications on file as of 12/7/2022.     Review of Systems   Constitutional: Negative.  Negative for chills, fever and malaise/fatigue.   HENT: Negative.  Negative for sore throat.    Eyes: Negative.    Respiratory: Negative.  Negative for cough, hemoptysis, sputum production, shortness of breath, wheezing and stridor.    Cardiovascular: Negative.  Negative for chest pain, palpitations, orthopnea, claudication, leg swelling and PND.   Gastrointestinal: Negative.    Genitourinary: Negative.    Musculoskeletal: Negative.    Skin: Negative.    Neurological: Negative.  Negative for dizziness, loss of consciousness and weakness.   Endo/Heme/Allergies: Negative.  Does not bruise/bleed easily.   All other systems reviewed and are negative.     Objective:   /70 (BP Location: Right arm, Patient Position: Sitting, BP Cuff Size: Adult)   Pulse 76   Resp 16   Ht 1.829 m (6')   Wt 104 kg (229 lb)   SpO2 98%   BMI 31.06 kg/m²     Physical Exam  Vitals and nursing note reviewed.   Constitutional:       General: He is not in acute distress.     Appearance: He is well-developed. He is not diaphoretic.   HENT:      Head: Normocephalic and atraumatic.      Right Ear: External ear normal.      Left Ear: External ear normal.      Nose: Nose normal.      Mouth/Throat:      Pharynx: No oropharyngeal exudate.   Eyes:       General: No scleral icterus.        Right eye: No discharge.         Left eye: No discharge.      Conjunctiva/sclera: Conjunctivae normal.      Pupils: Pupils are equal, round, and reactive to light.   Neck:      Vascular: No JVD.   Cardiovascular:      Rate and Rhythm: Normal rate and regular rhythm.      Heart sounds: No murmur heard.    No friction rub. No gallop.   Pulmonary:      Effort: Pulmonary effort is normal. No respiratory distress.      Breath sounds: No stridor. No wheezing or rales.   Chest:      Chest wall: No tenderness.   Abdominal:      General: There is no distension.      Palpations: Abdomen is soft.      Tenderness: There is no guarding.   Musculoskeletal:         General: No tenderness or deformity. Normal range of motion.      Cervical back: Neck supple.   Skin:     General: Skin is warm and dry.      Coloration: Skin is not pale.      Findings: No erythema or rash.   Neurological:      Mental Status: He is alert.      Cranial Nerves: No cranial nerve deficit.      Motor: No abnormal muscle tone.      Coordination: Coordination normal.      Deep Tendon Reflexes: Reflexes are normal and symmetric. Reflexes normal.   Psychiatric:         Behavior: Behavior normal.         Thought Content: Thought content normal.         Judgment: Judgment normal.     Echocardiogram: Dated 9/24/2019 personally viewed interpreted by myself showing normal LV systolic function no valvular heart disease.    Stress test: Dated 9/21/2019 personally viewed interpreted by myself showing    Lab Results   Component Value Date/Time    CHOLSTRLTOT 79 (L) 06/02/2021 06:13 AM    LDL 26 06/02/2021 06:13 AM    HDL 35 (A) 06/02/2021 06:13 AM    TRIGLYCERIDE 88 06/02/2021 06:13 AM       Lab Results   Component Value Date/Time    SODIUM 136 12/04/2022 12:48 AM    POTASSIUM 3.4 (L) 12/04/2022 12:48 AM    CHLORIDE 102 12/04/2022 12:48 AM    CO2 23 12/04/2022 12:48 AM    GLUCOSE 146 (H) 12/04/2022 12:48 AM    BUN 34 (H) 12/04/2022  12:48 AM    CREATININE 1.80 (H) 12/04/2022 12:48 AM     Lab Results   Component Value Date/Time    ALKPHOSPHAT 62 12/02/2022 12:09 AM    ASTSGOT 19 12/02/2022 12:09 AM    ALTSGPT 13 12/02/2022 12:09 AM    TBILIRUBIN 1.1 12/02/2022 12:09 AM        Assessment:     1. NSTEMI (non-ST elevated myocardial infarction) ([facility])  enalapril (VASOTEC) 20 MG tablet    carvedilol (COREG) 12.5 MG Tab    DISCONTINUED: carvedilol (COREG) 12.5 MG Tab      2. S/P transmetatarsal amputation of foot, right ([facility])        3. Normocytic anemia  enalapril (VASOTEC) 20 MG tablet    carvedilol (COREG) 12.5 MG Tab    DISCONTINUED: carvedilol (COREG) 12.5 MG Tab      4. Type 2 diabetes mellitus with hyperglycemia, with long-term current use of insulin ([facility])        5. Primary hypertension        6. Stage 3a chronic kidney disease ([facility])        7. Functional diarrhea        8. Coronary artery disease involving native coronary artery of native heart without angina pectoris  enalapril (VASOTEC) 20 MG tablet    carvedilol (COREG) 12.5 MG Tab    DISCONTINUED: carvedilol (COREG) 12.5 MG Tab      9. Syncope and collapse        10. Hypoglycemia        11. COVID-19        12. Morbid (severe) obesity due to excess calories ([facility])        13. Presence of stent in coronary artery        14. History of non-ST elevation myocardial infarction (NSTEMI)  enalapril (VASOTEC) 20 MG tablet    carvedilol (COREG) 12.5 MG Tab    DISCONTINUED: carvedilol (COREG) 12.5 MG Tab      15. Localized swelling of both lower legs        16. Obesity (BMI 35.0-39.9 without comorbidity)        17. Acute heart failure, unspecified heart failure type ([facility])        18. Renal insufficiency        19. Serum albumin decreased        20. Troponin I above reference range        21. Other hypervolemia        22. Diabetic foot infection (HCC)  enalapril (VASOTEC) 20 MG tablet    carvedilol (COREG) 12.5 MG Tab    DISCONTINUED: carvedilol (COREG) 12.5 MG Tab          Medical Decision Making:  Today's  Assessment / Status / Plan:     51-year-old male on dual antiplatelet therapy for ACS status post stent.  I have refilled his carvedilol and enalapril.  Otherwise very happy with how he is doing.  He will follow-up with nephrology.  We will see him back in 6 months.

## 2022-12-09 DIAGNOSIS — E55.9 VITAMIN D DEFICIENCY: ICD-10-CM

## 2022-12-09 DIAGNOSIS — N18.32 STAGE 3B CHRONIC KIDNEY DISEASE: ICD-10-CM

## 2022-12-09 DIAGNOSIS — D64.9 NORMOCYTIC ANEMIA: ICD-10-CM

## 2022-12-09 PROBLEM — N28.9 RENAL INSUFFICIENCY: Status: RESOLVED | Noted: 2022-11-17 | Resolved: 2022-12-09

## 2022-12-13 ENCOUNTER — PATIENT OUTREACH (OUTPATIENT)
Dept: HEALTH INFORMATION MANAGEMENT | Facility: OTHER | Age: 52
End: 2022-12-13

## 2022-12-13 ENCOUNTER — OFFICE VISIT (OUTPATIENT)
Dept: MEDICAL GROUP | Facility: PHYSICIAN GROUP | Age: 52
End: 2022-12-13
Payer: MEDICARE

## 2022-12-13 VITALS
DIASTOLIC BLOOD PRESSURE: 86 MMHG | TEMPERATURE: 97.1 F | RESPIRATION RATE: 18 BRPM | OXYGEN SATURATION: 99 % | WEIGHT: 230.2 LBS | HEIGHT: 72 IN | HEART RATE: 73 BPM | BODY MASS INDEX: 31.18 KG/M2 | SYSTOLIC BLOOD PRESSURE: 128 MMHG

## 2022-12-13 DIAGNOSIS — E11.65 TYPE 2 DIABETES MELLITUS WITH HYPERGLYCEMIA, WITH LONG-TERM CURRENT USE OF INSULIN (HCC): Chronic | ICD-10-CM

## 2022-12-13 DIAGNOSIS — R22.43 LOCALIZED SWELLING OF BOTH LOWER LEGS: ICD-10-CM

## 2022-12-13 DIAGNOSIS — N18.32 STAGE 3B CHRONIC KIDNEY DISEASE: ICD-10-CM

## 2022-12-13 DIAGNOSIS — Z79.4 TYPE 2 DIABETES MELLITUS WITH HYPERGLYCEMIA, WITH LONG-TERM CURRENT USE OF INSULIN (HCC): Chronic | ICD-10-CM

## 2022-12-13 DIAGNOSIS — I10 PRIMARY HYPERTENSION: Chronic | ICD-10-CM

## 2022-12-13 PROCEDURE — 99490 CHRNC CARE MGMT STAFF 1ST 20: CPT | Performed by: FAMILY MEDICINE

## 2022-12-13 PROCEDURE — 99439 CHRNC CARE MGMT STAF EA ADDL: CPT | Performed by: FAMILY MEDICINE

## 2022-12-13 PROCEDURE — 99495 TRANSJ CARE MGMT MOD F2F 14D: CPT | Performed by: FAMILY MEDICINE

## 2022-12-13 SDOH — HEALTH STABILITY: MENTAL HEALTH: HOW OFTEN DO YOU HAVE 6 OR MORE DRINKS ON ONE OCCASION?: NEVER

## 2022-12-13 SDOH — ECONOMIC STABILITY: HOUSING INSECURITY: IN THE LAST 12 MONTHS, HOW MANY PLACES HAVE YOU LIVED?: 1

## 2022-12-13 SDOH — ECONOMIC STABILITY: FOOD INSECURITY: WITHIN THE PAST 12 MONTHS, THE FOOD YOU BOUGHT JUST DIDN'T LAST AND YOU DIDN'T HAVE MONEY TO GET MORE.: NEVER TRUE

## 2022-12-13 SDOH — ECONOMIC STABILITY: INCOME INSECURITY: IN THE LAST 12 MONTHS, WAS THERE A TIME WHEN YOU WERE NOT ABLE TO PAY THE MORTGAGE OR RENT ON TIME?: NO

## 2022-12-13 SDOH — SOCIAL STABILITY: SOCIAL NETWORK: HOW OFTEN DO YOU GET TOGETHER WITH FRIENDS OR RELATIVES?: MORE THAN THREE TIMES A WEEK

## 2022-12-13 SDOH — ECONOMIC STABILITY: INCOME INSECURITY: HOW HARD IS IT FOR YOU TO PAY FOR THE VERY BASICS LIKE FOOD, HOUSING, MEDICAL CARE, AND HEATING?: NOT HARD AT ALL

## 2022-12-13 SDOH — HEALTH STABILITY: MENTAL HEALTH: HOW OFTEN DO YOU HAVE A DRINK CONTAINING ALCOHOL?: NEVER

## 2022-12-13 SDOH — SOCIAL STABILITY: SOCIAL NETWORK
DO YOU BELONG TO ANY CLUBS OR ORGANIZATIONS SUCH AS CHURCH GROUPS UNIONS, FRATERNAL OR ATHLETIC GROUPS, OR SCHOOL GROUPS?: YES

## 2022-12-13 SDOH — HEALTH STABILITY: PHYSICAL HEALTH: ON AVERAGE, HOW MANY DAYS PER WEEK DO YOU ENGAGE IN MODERATE TO STRENUOUS EXERCISE (LIKE A BRISK WALK)?: 2 DAYS

## 2022-12-13 SDOH — SOCIAL STABILITY: SOCIAL NETWORK
IN A TYPICAL WEEK, HOW MANY TIMES DO YOU TALK ON THE PHONE WITH FAMILY, FRIENDS, OR NEIGHBORS?: MORE THAN THREE TIMES A WEEK

## 2022-12-13 SDOH — ECONOMIC STABILITY: FOOD INSECURITY: WITHIN THE PAST 12 MONTHS, YOU WORRIED THAT YOUR FOOD WOULD RUN OUT BEFORE YOU GOT MONEY TO BUY MORE.: NEVER TRUE

## 2022-12-13 SDOH — SOCIAL STABILITY: SOCIAL NETWORK: ARE YOU MARRIED, WIDOWED, DIVORCED, SEPARATED, NEVER MARRIED, OR LIVING WITH A PARTNER?: MARRIED

## 2022-12-13 SDOH — HEALTH STABILITY: PHYSICAL HEALTH: ON AVERAGE, HOW MANY MINUTES DO YOU ENGAGE IN EXERCISE AT THIS LEVEL?: 40 MIN

## 2022-12-13 SDOH — SOCIAL STABILITY: SOCIAL NETWORK: HOW OFTEN DO YOU ATTEND CHURCH OR RELIGIOUS SERVICES?: NEVER

## 2022-12-13 SDOH — SOCIAL STABILITY: SOCIAL NETWORK: HOW OFTEN DO YOU ATTENT MEETINGS OF THE CLUB OR ORGANIZATION YOU BELONG TO?: NEVER

## 2022-12-13 SDOH — HEALTH STABILITY: MENTAL HEALTH: HOW MANY STANDARD DRINKS CONTAINING ALCOHOL DO YOU HAVE ON A TYPICAL DAY?: PATIENT DOES NOT DRINK

## 2022-12-13 ASSESSMENT — PATIENT HEALTH QUESTIONNAIRE - PHQ9: CLINICAL INTERPRETATION OF PHQ2 SCORE: 0

## 2022-12-13 ASSESSMENT — FIBROSIS 4 INDEX: FIB4 SCORE: 1.48

## 2022-12-13 ASSESSMENT — LIFESTYLE VARIABLES
AUDIT-C TOTAL SCORE: 0
SKIP TO QUESTIONS 9-10: 1

## 2022-12-13 NOTE — PROGRESS NOTES
"Subjective:     CC:    Chief Complaint   Patient presents with    Follow-Up       HISTORY OF THE PRESENT ILLNESS: Patient is a 52 y.o. male. This pleasant patient is here today to establish care.    No problem-specific Assessment & Plan notes found for this encounter.      Allergies: Patient has no known allergies.    Current Outpatient Medications Ordered in Epic   Medication Sig Dispense Refill    enalapril (VASOTEC) 20 MG tablet Take 1 Tablet by mouth 2 times a day. 180 Tablet 3    carvedilol (COREG) 12.5 MG Tab Take 1 Tablet by mouth 2 times a day with meals. 180 Tablet 3    sodium bicarbonate (SODIUM BICARBONATE) 650 MG Tab Take 2 Tablets by mouth 2 times a day. 120 Tablet 3    insulin glargine 100 UNIT/ML SC SOLN Inject 10 Units under the skin every evening. 10 mL 0    insulin regular (HUMULIN R) 100 Unit/mL Solution Inject 5 Units under the skin 3 times a day before meals. 10 mL 2    Insulin Syringe-Needle U-100 (BD INSULIN SYRINGE U/F) 30G X 1/2\" 0.3 ML Misc Use one syringe to inject insulin three times daily. 100 Each 0    furosemide (LASIX) 80 MG Tab Take 2 Tablets by mouth 2 times a day for 30 days. 120 Tablet 0    aspirin EC (ECOTRIN) 81 MG Tablet Delayed Response Take 81 mg by mouth every day.      atorvastatin (LIPITOR) 80 MG tablet Take 80 mg by mouth every evening.      potassium chloride SA (KDUR) 20 MEQ Tab CR Take 1 Tablet by mouth 2 times a day. 60 Tablet 0    Insulin Pen Needle (PEN NEEDLES) 31G X 5 MM Misc Inject 1 Application under the skin every day. 90 Each 2    cholestyramine (QUESTRAN) 4 GM/DOSE powder Take 4 g by mouth 2 times a day. Mixed with 4-6 oz water.  Pt takes at 1000 and 1700.   Hold all other PO medications an hour before and 4 hours after dosing.      omeprazole (PRILOSEC) 20 MG delayed-release capsule Take 1 Capsule by mouth every morning.       No current [facility]-ordered facility-administered medications on file.       Past Medical History:   Diagnosis Date    Anesthesia     " Hypotension post-op, persisted for a few months    Bowel habit changes     History GI problems    Cataract     IOL - Left eye    Diabetes (HCC)     Oral medications & insulin    Heart burn     GERD; takes Prilosec    Hemorrhagic disorder (HCC)     ASA protection for stent and cardiac history    Hx of heart artery stent     Hyperlipidemia     Hypertension        Past Surgical History:   Procedure Laterality Date    TOE AMPUTATION Left 2021    Procedure: AMPUTATION, TOE;  Surgeon: Tomer Hart M.D.;  Location: [facility];  Service: Orthopedics    STENT PLACEMENT  2019    STEMI with nonobstructive LAD    TOE AMPUTATION Right 2018    Procedure: Transmetatarsal amputation;  Surgeon: Ravi Bernardo M.D.;  Location: SURGERY [facility];  Service: Orthopedics    ACHILLES TENDON REPAIR Right 2018    Procedure: ACHILLES LENGTHENING;  Surgeon: Ravi Bernardo M.D.;  Location: SURGERY [facility];  Service: Orthopedics    IRRIGATION & DEBRIDEMENT ORTHO Right 2018    Procedure: IRRIGATION & DEBRIDEMENT ORTHO;  Surgeon: Ravi Bernardo M.D.;  Location: SURGERY [facility];  Service: Orthopedics    OTHER Left     IOL    OTHER ABDOMINAL SURGERY      OTHER ORTHOPEDIC SURGERY      TONSILLECTOMY      VASECTOMY         Social History     Tobacco Use    Smoking status: Former     Packs/day: 0.50     Years: 10.00     Pack years: 5.00     Types: Cigarettes     Quit date:      Years since quittin.9    Smokeless tobacco: Never   Vaping Use    Vaping Use: Never used   Substance Use Topics    Alcohol use: No    Drug use: Not Currently     Types: Marijuana, Oral     Comment: CBD Edibles 3-4 times per week       Social History     Social History Narrative    Not on file       Family History   Problem Relation Age of Onset    No Known Problems Mother     Diabetes Father     Heart Disease Father     Diabetes Maternal Grandmother     Heart Disease Maternal Grandfather     Lung Disease  Paternal Grandmother     Cancer Paternal Grandmother     Cancer Paternal Grandfather     Lung Cancer Paternal Grandfather     Kidney Disease Neg Hx        Health Maintenance: {COMPLETED:036309}    ROS:   Gen: no fevers/chills, no changes in weight  Eyes: no changes in vision  ENT: no sore throat, no hearing loss, no bloody nose  Pulm: no sob, no cough  CV: no chest pain, no palpitations  GI: no nausea/vomiting, no diarrhea  : no dysuria  MSk: no myalgias  Skin: no rash  Neuro: no headaches, no numbness/tingling  Heme/Lymph: no easy bruising      Objective:     Exam: /86 (BP Location: Left arm, Patient Position: Sitting, BP Cuff Size: Adult)   Pulse 73   Temp 36.2 °C (97.1 °F) (Temporal)   Resp 18   Ht 1.829 m (6')   Wt 104 kg (230 lb 3.2 oz)   SpO2 99%  Body mass index is 31.22 kg/m².    Gen: Alert and oriented, No apparent distress.  Skin: Warm and dry.  No obvious lesions.  Eyes: Sclera wnl Pupils normal in size  ENT: Canals wnl and TM are not red  Neck: Neck is supple without lymphadenopathy.  Lungs: Normal effort, CTA bilaterally, no wheezes, rhonchi, or rales  CV: Regular rate and rhythm. No murmurs, rubs, or gallops.  ABD: Soft non-tender no organomegaly  Musculoskeletal: Normal gait. No extremity cyanosis, clubbing, or edema.  Neuro: Oriented to person, place and time  Psych: Mood is wnl     A chaperone was offered to the patient during today's exam. {CHAPERONE:51406}    Labs: ***    Assessment & Plan:   52 y.o. male with the following -    There are no diagnoses linked to this encounter.     No follow-ups on file.    Please note that this dictation was created using voice recognition software. I have made every reasonable attempt to correct obvious errors, but I expect that there are errors of grammar and possibly content that I did not discover before finalizing the note.

## 2022-12-14 ENCOUNTER — PATIENT MESSAGE (OUTPATIENT)
Dept: HEALTH INFORMATION MANAGEMENT | Facility: OTHER | Age: 52
End: 2022-12-14

## 2022-12-14 NOTE — PROGRESS NOTES
INITIAL CARE MANAGEMENT CARE PLAN/ASSESSMENT     Beni is a 52 year old in office to see PCP Florinda Pascual for [facility] hospital follow up for diabetes. Beni has recently been hospitalized for a fall as well as his blood sugar being low in the 30's. I spoke with him following his appointment to introduce the Personal Care Management program and CCM Services. At this time Beni did express his interest in enrolling and verbalized his full name and . Today (2022) an Urgent Endocrinology referral was placed as this is the main focus at this time to get Beni healthy. He says he has had his diabetes under control for 20 plus years and does not know why now he is having a hard time getting his numbers under mid 200-300. He also says it scared him when his blood sugar was in the 30's in the hospital as that has not been a problem in the past either. While he was in the hospital he was also found to have severe fluid overload which he was given Lasix as well as oral lasix has been increased for home use. Beni says he does follow a diabetic diet as well as low sodium and he carb counts limiting his carbs per meal to 45-55 grams. He says he takes 5 units of insulin if his blood sugar is under 200 and takes 10 units if it is over 300. As well as his Lantus at night which has been increased today(2022) at his appointment. Beni says he checks his blood sugars 5 times per day and would like a CGM if able to get one. Explained that insurance does not generally pay for CGM but Endocrinology would be the best place for him to get set up with that as he is checking blood suagrs 5 times per day as well as on insulin multiple times per day. Beni lives with his wife in [place] in a single family home no stairs. He denies having issues with stairs or curbs at this time. 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 He says he does occasionally have to stable himself as well as stop what he is doing to accommodate for his balance issues. Beni is an active  at this time and says they are financially stable so denies resources at this time. He also denies thoughts of sadness or depression. Goals will be to get blood sugars under control and to increase exercise as he does partake in walks a couple times per week but would like to increase that frequency and amount of time that he is active. Explained this will also help lower his blood sugars. Recent A1C is 5.3.     Medication Self-Management Goals:     Reviewed medications listed below with patient.      Current Outpatient Medications:     enalapril (VASOTEC) 20 MG tablet, Take 1 Tablet by mouth 2 times a day., Disp: 180 Tablet, Rfl: 3    carvedilol (COREG) 12.5 MG Tab, Take 1 Tablet by mouth 2 times a day with meals., Disp: 180 Tablet, Rfl: 3    sodium bicarbonate (SODIUM BICARBONATE) 650 MG Tab, Take 2 Tablets by mouth 2 times a day., Disp: 120 Tablet, Rfl: 3    insulin glargine 100 UNIT/ML SC SOLN, Inject 10 Units under the skin every evening., Disp: 10 mL, Rfl: 0    insulin regular (HUMULIN R) 100 Unit/mL Solution, Inject 5 Units under the skin 3 times a day before meals., Disp: 10 mL, Rfl: 2    Insulin Syringe-Needle U-100 (BD INSULIN SYRINGE U/F) 30G X 1/2\" 0.3 ML Misc, Use one syringe to inject insulin three times daily., Disp: 100 Each, Rfl: 0    furosemide (LASIX) 80 MG Tab, Take 2 Tablets by mouth 2 times a day for 30 days., Disp: 120 Tablet, Rfl: 0    aspirin EC (ECOTRIN) 81 MG Tablet Delayed Response, Take 81 mg by mouth every day., Disp: , Rfl:     atorvastatin (LIPITOR) 80 MG tablet, Take 80 mg by mouth every evening., Disp: , Rfl:     potassium chloride SA (KDUR) 20 MEQ Tab CR, Take 1 Tablet by mouth 2 times a day., Disp: 60 Tablet, Rfl: 0    Insulin Pen Needle (PEN NEEDLES) 31G X 5 MM Misc, Inject 1 " Application under the skin every day., Disp: 90 Each, Rfl: 2    cholestyramine (QUESTRAN) 4 GM/DOSE powder, Take 4 g by mouth 2 times a day. Mixed with 4-6 oz water. Pt takes at 1000 and 1700.  Hold all other PO medications an hour before and 4 hours after dosing., Disp: , Rfl:     omeprazole (PRILOSEC) 20 MG delayed-release capsule, Take 1 Capsule by mouth every morning., Disp: , Rfl:      Goal: Medication adherence       Physical/Functional/Environmental Status:    Beni is independent in his ADL/IADL's. He does not use any medical equipment at this time. He does have balance issues as he has only half a foot on his right and as has had his toes amputated of the left. He is aware of these balance issues and takes precautions when he is hunting as well as general everyday safety to help prevent falls.      Activities of Daily Living:  Bathing: independent   Dressing: independent  Grooming: independent  Mouth Care: independent  Toileting: independent  Climbing a Flight of Stairs: independent    Independent Activities of Daily Living:  Shopping: independent  Cooking: independent  Managing Medications: independent  Using the phone and looking up numbers: independent  Driving or using public transportation: independent  Managing Finances: independent        8/25/2021    10:32 AM 10/6/2021     2:33 PM 12/13/2022    11:49 AM   STEADI Fall Risk   STEADI Risk for Falling Score   5   One or more falls in the last year Yes Yes Yes       has balance issues... lost conciousness- BP low due to covid,   Advised to use a cane or walker to get around safely   No   Feels unsteady when walking   Yes       sometimes   Steadies self on furniture while walking at home   Yes       does at times   Worried about falling   No   Needs to push with hands when rising from a chair   No   Has trouble stepping up onto a curb / using stairs   No   Often has to rush to the toilet   No   Has lost some feeling in feet   Yes       only half foot on  right has lost toes on left   Takes medicine that makes him/her feel lightheaded or more tired than usual   No   Takes medicine to sleep or improve mood   No   Often feels sad or depressed   No     Goal: Decrease Falls/ General safety     Financial Status:     Beni says him and his wife are financially stable at this time and denies needing resources     Goal: N/A     Transportation Status:    Beni is an active  at this time and denies transportation difficulty or the needs for transportation resources    Goal: N/A    Mental/Behavioral/Psychosocial Status:    Beni denies feelings of sadness or depression        11/28/2022     9:00 PM 12/1/2022     7:30 AM 12/13/2022    11:48 AM   Depression Screen (PHQ-2/PHQ-9)   PHQ-2 Total Score 0 0    PHQ-2 Total Score   0       Interpretation of PHQ-9 Total Score   Score Severity   1-4 No Depression   5-9 Mild Depression   10-14 Moderate Depression   15-19 Moderately Severe Depression   20-27 Severe Depression       Goal: N/A       Chronic Care Management Care Plan     Exercise    Goal: Increase activity level as tolerated     Barriers: age, knowledge, health status, habits  Interventions: education, motivation, resources    Start Date: 12/13/2022  End Date:    Diabetic Control    Goal: Get glucose number within normal limits  Barriers: age, knowledge, habits, medical diagnosis  Interventions: education, motivation, resources    Start Date: 12/13/2022  End Date:        Discussion: Personal Care Management, CCM Services, Goals, referrals and follow ups    Goals: Created     Next Scheduled patient outreach: 1/10/2023

## 2022-12-19 ENCOUNTER — HOSPITAL ENCOUNTER (OUTPATIENT)
Dept: LAB | Facility: MEDICAL CENTER | Age: 52
End: 2022-12-19
Attending: PHYSICIAN ASSISTANT
Payer: MEDICARE

## 2022-12-19 ENCOUNTER — HOSPITAL ENCOUNTER (OUTPATIENT)
Dept: LAB | Facility: MEDICAL CENTER | Age: 52
End: 2022-12-19
Attending: INTERNAL MEDICINE
Payer: MEDICARE

## 2022-12-19 DIAGNOSIS — N18.32 STAGE 3B CHRONIC KIDNEY DISEASE: ICD-10-CM

## 2022-12-19 DIAGNOSIS — D64.9 NORMOCYTIC ANEMIA: ICD-10-CM

## 2022-12-19 DIAGNOSIS — E55.9 VITAMIN D DEFICIENCY: ICD-10-CM

## 2022-12-19 LAB
25(OH)D3 SERPL-MCNC: <6 NG/ML (ref 30–100)
ALBUMIN SERPL BCP-MCNC: 2.6 G/DL (ref 3.2–4.9)
ALBUMIN SERPL BCP-MCNC: 2.6 G/DL (ref 3.2–4.9)
ALBUMIN/GLOB SERPL: 1 G/DL
ALP SERPL-CCNC: 81 U/L (ref 30–99)
ALT SERPL-CCNC: 25 U/L (ref 2–50)
ANION GAP SERPL CALC-SCNC: 8 MMOL/L (ref 7–16)
ANION GAP SERPL CALC-SCNC: 9 MMOL/L (ref 7–16)
APPEARANCE UR: CLEAR
AST SERPL-CCNC: 30 U/L (ref 12–45)
BACTERIA #/AREA URNS HPF: NEGATIVE /HPF
BILIRUB SERPL-MCNC: 0.8 MG/DL (ref 0.1–1.5)
BILIRUB UR QL STRIP.AUTO: NEGATIVE
BUN SERPL-MCNC: 35 MG/DL (ref 8–22)
BUN SERPL-MCNC: 35 MG/DL (ref 8–22)
CALCIUM ALBUM COR SERPL-MCNC: 9.1 MG/DL (ref 8.5–10.5)
CALCIUM SERPL-MCNC: 7.9 MG/DL (ref 8.5–10.5)
CALCIUM SERPL-MCNC: 8 MG/DL (ref 8.5–10.5)
CHLORIDE SERPL-SCNC: 102 MMOL/L (ref 96–112)
CHLORIDE SERPL-SCNC: 102 MMOL/L (ref 96–112)
CO2 SERPL-SCNC: 26 MMOL/L (ref 20–33)
CO2 SERPL-SCNC: 26 MMOL/L (ref 20–33)
COLOR UR: YELLOW
CREAT SERPL-MCNC: 2.15 MG/DL (ref 0.5–1.4)
CREAT SERPL-MCNC: 2.16 MG/DL (ref 0.5–1.4)
CREAT UR-MCNC: 26.68 MG/DL
CREAT UR-MCNC: 27.15 MG/DL
CREAT UR-MCNC: 27.19 MG/DL
EPI CELLS #/AREA URNS HPF: NEGATIVE /HPF
ERYTHROCYTE [DISTWIDTH] IN BLOOD BY AUTOMATED COUNT: 41.5 FL (ref 35.9–50)
FERRITIN SERPL-MCNC: 351 NG/ML (ref 22–322)
GFR SERPLBLD CREATININE-BSD FMLA CKD-EPI: 35 ML/MIN/1.73 M 2
GFR SERPLBLD CREATININE-BSD FMLA CKD-EPI: 36 ML/MIN/1.73 M 2
GLOBULIN SER CALC-MCNC: 2.7 G/DL (ref 1.9–3.5)
GLUCOSE SERPL-MCNC: 306 MG/DL (ref 65–99)
GLUCOSE SERPL-MCNC: 315 MG/DL (ref 65–99)
GLUCOSE UR STRIP.AUTO-MCNC: >=1000 MG/DL
HCT VFR BLD AUTO: 32.1 % (ref 42–52)
HGB BLD-MCNC: 10.9 G/DL (ref 14–18)
IRON SATN MFR SERPL: 28 % (ref 15–55)
IRON SERPL-MCNC: 66 UG/DL (ref 50–180)
KETONES UR STRIP.AUTO-MCNC: NEGATIVE MG/DL
LEUKOCYTE ESTERASE UR QL STRIP.AUTO: NEGATIVE
MCH RBC QN AUTO: 30.3 PG (ref 27–33)
MCHC RBC AUTO-ENTMCNC: 34 G/DL (ref 33.7–35.3)
MCV RBC AUTO: 89.2 FL (ref 81.4–97.8)
MICRO URNS: ABNORMAL
MICROALBUMIN UR-MCNC: >40 MG/DL
MICROALBUMIN UR-MCNC: >40 MG/DL
MICROALBUMIN/CREAT UR: 1473 MG/G (ref 0–30)
MICROALBUMIN/CREAT UR: 1499 MG/G (ref 0–30)
NITRITE UR QL STRIP.AUTO: NEGATIVE
PH UR STRIP.AUTO: 6.5 [PH] (ref 5–8)
PHOSPHATE SERPL-MCNC: 5 MG/DL (ref 2.5–4.5)
PLATELET # BLD AUTO: 310 K/UL (ref 164–446)
PMV BLD AUTO: 10.7 FL (ref 9–12.9)
POTASSIUM SERPL-SCNC: 4 MMOL/L (ref 3.6–5.5)
POTASSIUM SERPL-SCNC: 4 MMOL/L (ref 3.6–5.5)
PROT SERPL-MCNC: 5.3 G/DL (ref 6–8.2)
PROT UR QL STRIP: 300 MG/DL
PROT UR-MCNC: 275 MG/DL (ref 0–15)
PROT/CREAT UR: ABNORMAL MG/G (ref 15–68)
PTH-INTACT SERPL-MCNC: 211 PG/ML (ref 14–72)
RBC # BLD AUTO: 3.6 M/UL (ref 4.7–6.1)
RBC # URNS HPF: ABNORMAL /HPF
RBC UR QL AUTO: ABNORMAL
SODIUM SERPL-SCNC: 136 MMOL/L (ref 135–145)
SODIUM SERPL-SCNC: 137 MMOL/L (ref 135–145)
SP GR UR STRIP.AUTO: 1.01
TIBC SERPL-MCNC: 237 UG/DL (ref 250–450)
UIBC SERPL-MCNC: 171 UG/DL (ref 110–370)
UROBILINOGEN UR STRIP.AUTO-MCNC: 0.2 MG/DL
WBC # BLD AUTO: 8.4 K/UL (ref 4.8–10.8)
WBC #/AREA URNS HPF: ABNORMAL /HPF

## 2022-12-19 PROCEDURE — 84156 ASSAY OF PROTEIN URINE: CPT

## 2022-12-19 PROCEDURE — 85027 COMPLETE CBC AUTOMATED: CPT

## 2022-12-19 PROCEDURE — 82728 ASSAY OF FERRITIN: CPT

## 2022-12-19 PROCEDURE — 82306 VITAMIN D 25 HYDROXY: CPT

## 2022-12-19 PROCEDURE — 82043 UR ALBUMIN QUANTITATIVE: CPT | Mod: 91

## 2022-12-19 PROCEDURE — 82570 ASSAY OF URINE CREATININE: CPT | Mod: 91

## 2022-12-19 PROCEDURE — 83540 ASSAY OF IRON: CPT

## 2022-12-19 PROCEDURE — 86200 CCP ANTIBODY: CPT

## 2022-12-19 PROCEDURE — 86341 ISLET CELL ANTIBODY: CPT

## 2022-12-19 PROCEDURE — 83550 IRON BINDING TEST: CPT

## 2022-12-19 PROCEDURE — 82043 UR ALBUMIN QUANTITATIVE: CPT

## 2022-12-19 PROCEDURE — 82040 ASSAY OF SERUM ALBUMIN: CPT | Mod: XU

## 2022-12-19 PROCEDURE — 84100 ASSAY OF PHOSPHORUS: CPT

## 2022-12-19 PROCEDURE — 83970 ASSAY OF PARATHORMONE: CPT

## 2022-12-19 PROCEDURE — 86337 INSULIN ANTIBODIES: CPT

## 2022-12-19 PROCEDURE — 80048 BASIC METABOLIC PNL TOTAL CA: CPT

## 2022-12-19 PROCEDURE — 80053 COMPREHEN METABOLIC PANEL: CPT

## 2022-12-19 PROCEDURE — 36415 COLL VENOUS BLD VENIPUNCTURE: CPT

## 2022-12-19 PROCEDURE — 81001 URINALYSIS AUTO W/SCOPE: CPT

## 2022-12-21 LAB — CCP IGG SERPL-ACNC: 4 UNITS (ref 0–19)

## 2022-12-22 LAB — INSULIN HUMAN AB SER-ACNC: <0.4 U/ML (ref 0–0.4)

## 2022-12-23 LAB — GAD65 AB SER IA-ACNC: <5 IU/ML (ref 0–5)

## 2023-01-10 ENCOUNTER — OFFICE VISIT (OUTPATIENT)
Dept: NEPHROLOGY | Facility: MEDICAL CENTER | Age: 53
End: 2023-01-10
Payer: MEDICARE

## 2023-01-10 VITALS
TEMPERATURE: 97 F | HEIGHT: 72 IN | DIASTOLIC BLOOD PRESSURE: 60 MMHG | BODY MASS INDEX: 29.8 KG/M2 | WEIGHT: 220 LBS | HEART RATE: 85 BPM | SYSTOLIC BLOOD PRESSURE: 88 MMHG | OXYGEN SATURATION: 99 %

## 2023-01-10 DIAGNOSIS — E11.628 DIABETIC FOOT INFECTION (HCC): ICD-10-CM

## 2023-01-10 DIAGNOSIS — E11.3599 TYPE 2 DIABETES MELLITUS WITH PROLIFERATIVE RETINOPATHY, WITH LONG-TERM CURRENT USE OF INSULIN, MACULAR EDEMA PRESENCE UNSPECIFIED, UNSPECIFIED LATERALITY, UNSPECIFIED PROLIFERATIVE RETINOPATHY* (HCC): Chronic | ICD-10-CM

## 2023-01-10 DIAGNOSIS — I21.4 NSTEMI (NON-ST ELEVATED MYOCARDIAL INFARCTION) (HCC): ICD-10-CM

## 2023-01-10 DIAGNOSIS — Z79.4 TYPE 2 DIABETES MELLITUS WITH PROLIFERATIVE RETINOPATHY, WITH LONG-TERM CURRENT USE OF INSULIN, MACULAR EDEMA PRESENCE UNSPECIFIED, UNSPECIFIED LATERALITY, UNSPECIFIED PROLIFERATIVE RETINOPATHY* (HCC): Chronic | ICD-10-CM

## 2023-01-10 DIAGNOSIS — I25.2 HISTORY OF NON-ST ELEVATION MYOCARDIAL INFARCTION (NSTEMI): ICD-10-CM

## 2023-01-10 DIAGNOSIS — D64.9 NORMOCYTIC ANEMIA: ICD-10-CM

## 2023-01-10 DIAGNOSIS — N25.81 HYPERPARATHYROIDISM DUE TO RENAL INSUFFICIENCY (HCC): ICD-10-CM

## 2023-01-10 DIAGNOSIS — E55.9 VITAMIN D DEFICIENCY: ICD-10-CM

## 2023-01-10 DIAGNOSIS — N18.32 STAGE 3B CHRONIC KIDNEY DISEASE: ICD-10-CM

## 2023-01-10 DIAGNOSIS — I25.10 CORONARY ARTERY DISEASE INVOLVING NATIVE CORONARY ARTERY OF NATIVE HEART WITHOUT ANGINA PECTORIS: ICD-10-CM

## 2023-01-10 DIAGNOSIS — L08.9 DIABETIC FOOT INFECTION (HCC): ICD-10-CM

## 2023-01-10 DIAGNOSIS — I10 PRIMARY HYPERTENSION: Chronic | ICD-10-CM

## 2023-01-10 PROBLEM — J90 PLEURAL EFFUSION: Status: RESOLVED | Noted: 2022-11-27 | Resolved: 2023-01-10

## 2023-01-10 PROBLEM — E88.09 SERUM ALBUMIN DECREASED: Status: RESOLVED | Noted: 2022-11-17 | Resolved: 2023-01-10

## 2023-01-10 PROBLEM — E87.70 FLUID OVERLOAD: Status: RESOLVED | Noted: 2022-11-27 | Resolved: 2023-01-10

## 2023-01-10 PROCEDURE — 99214 OFFICE O/P EST MOD 30 MIN: CPT | Performed by: INTERNAL MEDICINE

## 2023-01-10 RX ORDER — CHOLESTYRAMINE 4 G/9G
1 POWDER, FOR SUSPENSION ORAL 2 TIMES DAILY
COMMUNITY

## 2023-01-10 RX ORDER — FUROSEMIDE 80 MG
160 TABLET ORAL DAILY
COMMUNITY
End: 2023-01-10 | Stop reason: SDUPTHER

## 2023-01-10 RX ORDER — TIRZEPATIDE 10 MG/.5ML
INJECTION, SOLUTION SUBCUTANEOUS
COMMUNITY
Start: 2022-12-30 | End: 2023-05-09

## 2023-01-10 RX ORDER — ERGOCALCIFEROL 1.25 MG/1
50000 CAPSULE ORAL
Qty: 12 CAPSULE | Refills: 0 | Status: SHIPPED | OUTPATIENT
Start: 2023-01-10 | End: 2023-05-09 | Stop reason: SDUPTHER

## 2023-01-10 RX ORDER — CARVEDILOL 3.12 MG/1
3.12 TABLET ORAL 2 TIMES DAILY WITH MEALS
Qty: 180 TABLET | Refills: 3 | Status: SHIPPED | OUTPATIENT
Start: 2023-01-10 | End: 2023-01-25 | Stop reason: SDUPTHER

## 2023-01-10 RX ORDER — ENALAPRIL MALEATE 20 MG/1
10 TABLET ORAL 2 TIMES DAILY
Qty: 180 TABLET | Refills: 3
Start: 2023-01-10 | End: 2023-01-25 | Stop reason: SDUPTHER

## 2023-01-10 RX ORDER — TIRZEPATIDE 5 MG/.5ML
INJECTION, SOLUTION SUBCUTANEOUS
COMMUNITY
Start: 2022-12-22 | End: 2023-01-10

## 2023-01-10 RX ORDER — FUROSEMIDE 80 MG
160 TABLET ORAL DAILY
Qty: 180 TABLET | Refills: 3 | Status: SHIPPED | OUTPATIENT
Start: 2023-01-10 | End: 2023-01-25 | Stop reason: SDUPTHER

## 2023-01-10 RX ORDER — EMPAGLIFLOZIN 25 MG/1
TABLET, FILM COATED ORAL
COMMUNITY
End: 2023-12-06

## 2023-01-10 ASSESSMENT — ENCOUNTER SYMPTOMS
FEVER: 0
SHORTNESS OF BREATH: 0
ABDOMINAL PAIN: 0

## 2023-01-10 ASSESSMENT — FIBROSIS 4 INDEX: FIB4 SCORE: 1.05

## 2023-01-10 NOTE — PROGRESS NOTES
Chief Complaint   Patient presents with    Follow-Up    Chronic Kidney Disease       CC: f/u CKD, diabetic nephropathy, chronic TMA    HPI:  Beni Moore is a 52 y.o. male with diabetes complicated by retinopathy requiring injections, hypertension, CKD due to biopsy-proven diabetic nephropathy and chronic TMA who presents today for follow-up.    He was hospitalized in Nov/Dec, 2022 with anasarca, improved with high dose diuretics.     Re: DM2, diagnosed 1998. Patient has had microalbuminuria since at least 2018. Patient has seen an eye doctor and does have retinopathy requiring Eyelea injections (Dr. Yosef Dumont then changed to Dr. Perla LIRA) since 2020 and it has saved his vision. He has also had some foot amputations. He thinks his sugars are good. He's on Jardiance, Mounjaro, and Tresiba long acting insulin. He saw ophtho one month ago and got both eyes injected with Eylea. He will see Dr. LIRA again on 1/16/23.    Re: HTN, diagnosed 2018. BP control over the years has been controlled, until mid-late 2021. Two weeks ago he was passing out, his BP at home was 80's / 50's. So he cut enalapril from 20 bid to 10mg bid. He still gets a little dizzy sometimes. He had stopped his evening lasix, and he is just taking lasix 160mg once in the morning. He got his weight down to 196lbs nude. He used to be 280-290 lbs in 2/2022. He also cut his coreg from 12.5 to 6.25mg BID.     Re: CKD, denies history of AXEL, kidney stone.  He denies LUTS. Denies NSAIDs.    Re: CAD, he had MI in 9/2020 and was started on coreg. He's also on aspirin.       Past Medical History:   Diagnosis Date    Anesthesia     Hypotension post-op, persisted for a few months    Bowel habit changes     History GI problems    Cataract     IOL - Left eye    Diabetes (HCC)     Oral medications & insulin    Heart burn     GERD; takes Prilosec    Hemorrhagic disorder (HCC)     ASA protection for stent and cardiac history    Hx of heart artery stent 2019     Hyperlipidemia     Hypertension        Past Surgical History:   Procedure Laterality Date    TOE AMPUTATION Left 9/30/2021    Procedure: AMPUTATION, TOE;  Surgeon: Tomer Hart M.D.;  Location: SURGERY [facility];  Service: Orthopedics    STENT PLACEMENT  2019    STEMI with nonobstructive LAD    TOE AMPUTATION Right 7/2/2018    Procedure: Transmetatarsal amputation;  Surgeon: Ravi Bernardo M.D.;  Location: SURGERY [facility];  Service: Orthopedics    ACHILLES TENDON REPAIR Right 7/2/2018    Procedure: ACHILLES LENGTHENING;  Surgeon: Ravi Bernardo M.D.;  Location: SURGERY [facility];  Service: Orthopedics    IRRIGATION & DEBRIDEMENT ORTHO Right 7/2/2018    Procedure: IRRIGATION & DEBRIDEMENT ORTHO;  Surgeon: Ravi Bernardo M.D.;  Location: SURGERY [facility];  Service: Orthopedics    OTHER Left     IOL    OTHER ABDOMINAL SURGERY      OTHER ORTHOPEDIC SURGERY      TONSILLECTOMY      VASECTOMY          Outpatient Encounter Medications as of 1/10/2023   Medication Sig Dispense Refill    cholestyramine (QUESTRAN) 4 g packet       Empagliflozin (JARDIANCE) 25 MG Tab Take  by mouth.      carvedilol (COREG) 3.125 MG Tab Take 1 Tablet by mouth 2 times a day with meals. 180 Tablet 3    enalapril (VASOTEC) 20 MG tablet Take 0.5 Tablets by mouth 2 times a day. 180 Tablet 3    Insulin Degludec 100 UNIT/ML Solution Pen-injector Inject 10 Units under the skin at bedtime.      furosemide (LASIX) 80 MG Tab Take 2 Tablets by mouth every day. 160 mg  Tablet 3    vitamin D2, Ergocalciferol, (DRISDOL) 1.25 MG (97051 UT) Cap capsule Take 1 Capsule by mouth every 7 days. 12 Capsule 0    aspirin EC (ECOTRIN) 81 MG Tablet Delayed Response Take 81 mg by mouth every day.      atorvastatin (LIPITOR) 80 MG tablet Take 80 mg by mouth every evening.      omeprazole (PRILOSEC) 20 MG delayed-release capsule Take 1 Capsule by mouth every morning.      MOUNJARO 10 MG/0.5ML Solution Pen-injector       [DISCONTINUED]  "MOUNJARO 5 MG/0.5ML Solution Pen-injector INJECT 5 MG SUBCUTANEOUSLY ONCE WEEKLY      [DISCONTINUED] furosemide (LASIX) 80 MG Tab Take 160 mg by mouth every day. 160 mg PM      [DISCONTINUED] enalapril (VASOTEC) 20 MG tablet Take 1 Tablet by mouth 2 times a day. 180 Tablet 3    [DISCONTINUED] carvedilol (COREG) 12.5 MG Tab Take 1 Tablet by mouth 2 times a day with meals. 180 Tablet 3    Insulin Syringe-Needle U-100 (BD INSULIN SYRINGE U/F) 30G X 1/2\" 0.3 ML Misc Use one syringe to inject insulin three times daily. 100 Each 0    [DISCONTINUED] sodium bicarbonate (SODIUM BICARBONATE) 650 MG Tab Take 2 Tablets by mouth 2 times a day. (Patient not taking: Reported on 1/10/2023) 120 Tablet 3    [DISCONTINUED] insulin glargine 100 UNIT/ML SC SOLN Inject 10 Units under the skin every evening. 10 mL 0    [DISCONTINUED] insulin regular (HUMULIN R) 100 Unit/mL Solution Inject 5 Units under the skin 3 times a day before meals. (Patient not taking: Reported on 1/10/2023) 10 mL 2    [DISCONTINUED] potassium chloride SA (KDUR) 20 MEQ Tab CR Take 1 Tablet by mouth 2 times a day. (Patient not taking: Reported on 1/10/2023) 60 Tablet 0    Insulin Pen Needle (PEN NEEDLES) 31G X 5 MM Misc Inject 1 Application under the skin every day. 90 Each 2    [DISCONTINUED] cholestyramine (QUESTRAN) 4 GM/DOSE powder Take 4 g by mouth 2 times a day. Mixed with 4-6 oz water.  Pt takes at 1000 and 1700.   Hold all other PO medications an hour before and 4 hours after dosing.       No facility-administered encounter medications on file as of 1/10/2023.        No Known Allergies          Review of Systems   Constitutional:  Negative for fever.   Respiratory:  Negative for shortness of breath.    Cardiovascular:  Negative for chest pain and leg swelling.   Gastrointestinal:  Negative for abdominal pain.   All other systems reviewed and are negative.    BP (!) 88/60 (BP Location: Right arm, Patient Position: Sitting, BP Cuff Size: Adult)   Pulse 85   " Temp 36.1 °C (97 °F) (Temporal)   Ht 1.829 m (6')   Wt 99.8 kg (220 lb)   SpO2 99%   BMI 29.84 kg/m²     Physical Exam  Constitutional:       General: He is not in acute distress.  HENT:      Mouth/Throat:      Pharynx: No oropharyngeal exudate.   Eyes:      General: No scleral icterus.  Neck:      Trachea: No tracheal deviation.   Cardiovascular:      Rate and Rhythm: Normal rate and regular rhythm.      Heart sounds: Normal heart sounds. No murmur heard.  Pulmonary:      Effort: Pulmonary effort is normal.      Breath sounds: Normal breath sounds. No stridor. No rales.   Abdominal:      General: Bowel sounds are normal.      Palpations: Abdomen is soft.      Tenderness: There is no abdominal tenderness.   Musculoskeletal:         General: Normal range of motion.      Cervical back: Neck supple.      Right lower leg: Edema (trace) present.      Left lower leg: Edema (trace) present.   Skin:     General: Skin is warm and dry.      Findings: No rash.   Neurological:      General: No focal deficit present.      Mental Status: He is alert and oriented to person, place, and time.   Psychiatric:         Mood and Affect: Mood and affect normal.         Behavior: Behavior normal.         Labs reviewed.    Recent Labs     12/02/22  0009 12/03/22  0018 12/04/22  0048 12/19/22  1045 12/19/22  1052   ALBUMIN 1.6*  --   --  2.6* 2.6*   SODIUM 134* 137 136 137 136   POTASSIUM 3.7 3.3* 3.4* 4.0 4.0   CHLORIDE 103 105 102 102 102   CO2 22 22 23 26 26   BUN 38* 37* 34* 35* 35*   CREATININE 2.16* 1.80* 1.80* 2.15* 2.16*   PHOSPHORUS 3.2 3.0 2.8 5.0*  --        Lab Results   Component Value Date/Time    WBC 8.4 12/19/2022 10:45 AM    RBC 3.60 (L) 12/19/2022 10:45 AM    HEMOGLOBIN 10.9 (L) 12/19/2022 10:45 AM    HEMATOCRIT 32.1 (L) 12/19/2022 10:45 AM    MCV 89.2 12/19/2022 10:45 AM    MCH 30.3 12/19/2022 10:45 AM    MCHC 34.0 12/19/2022 10:45 AM    MPV 10.7 12/19/2022 10:45 AM                URINALYSIS:  Lab Results   Component  Value Date/Time    COLORURINE Yellow 12/19/2022 1048    CLARITY Clear 12/19/2022 1048    SPECGRAVITY 1.015 12/19/2022 1048    PHURINE 6.5 12/19/2022 1048    KETONES Negative 12/19/2022 1048    PROTEINURIN 300 (A) 12/19/2022 1048    BILIRUBINUR Negative 12/19/2022 1048    UROBILU 0.2 12/19/2022 1048    NITRITE Negative 12/19/2022 1048    LEUKESTERAS Negative 12/19/2022 1048    OCCULTBLOOD Small (A) 12/19/2022 1048       [facility] [place]  Lab Results   Component Value Date/Time    TOTPROTUR 275.0 (H) 12/19/2022 1048      Lab Results   Component Value Date/Time    CREATININEU 27.19 12/19/2022 1048       Imaging reviewed  No orders to display       Kidney biopsy 3/29/2022 interpreted by Dr. Ac  Diagnosis: Chronic glomerular microangiopathic changes.  Nodular diabetic glomerulosclerosis.  Comment: In light of the clinical history, the microangiopathic changes are likely secondary to anti-VEGF inhibitor use.  Other causes of chronic thrombotic microangiopathy are also on the differential.  There are no acute thrombi.  Light microscopy notable for approximately 50% interstitial fibrosis and tubular atrophy as well as evidence of acute tubular injury      Assessment:  Beni Moore is a 52 y.o. male with diabetes complicated by retinopathy requiring injections, hypertension, CKD due to biopsy-proven diabetic nephropathy and chronic TMA who presents today for follow-up.    Plan:  1. CKD stage 3b.    -CKD 3B has been unmasked after aggressive diuresis.  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  I recommend avoid NSAIDs and other nephrotoxins.  Low-salt diet. I explained the importance of glycemic and blood pressure control to help slow CKD progression.  The patient says he would rather go on dialysis than go blind, and is willing to accept the risk of further anti- VEGF ocular injections.  Maintain maximally tolerated medical therapy with ACE  inhibitor and SGLT2 inhibitor.    2. Type 2 diabetes mellitus with hyperglycemia, with long-term current use of insulin (HCC)  -Better controlled per patient.  Maintain maximal medical therapy with ACE inhibitor and SGLT2 inhibitor.  Defer further management to primary care.    3. Primary hypertension  -Controlled, and now on the low side with occasional syncope at home. Recommend maintain Lasix 160 mg daily to help maintain volume status.  Recommend maintain ACE inhibitor at maximally tolerated dose, now down to 10 mg enalapril p.o. twice daily.  Recommend reduce carvedilol from 6.25 to 3.125 mg p.o. twice daily.  If hypotension or syncope persist, I would recommend reducing enalapril to 10 mg once daily, and if further reductions are necessary, would recommend discontinue carvedilol.    4.  Anemia, unspecified  - Iron replete on labs in December 2022.  There is no acute need for Epogen with hemoglobin over 10.  Continue to monitor CBC and iron studies.    5.  Hyperparathyroidism and vitamin D deficiency  - I will prescribe ergocalciferol 50,000 units by mouth weekly for 12 weeks.  Patient says it was too expensive last time it was prescribed.  If he is unable to afford weekly ergocalciferol, I recommend daily cholecalciferol 5000 units.      Return to clinic 4 months with preclinic labs    Varun Ortiz MD  Nephrology

## 2023-01-10 NOTE — PATIENT INSTRUCTIONS
If you cannot get weekly vitamin D prescription, take over the counter vitamin D 5000 units daily.

## 2023-01-11 ENCOUNTER — OFFICE VISIT (OUTPATIENT)
Dept: MEDICAL GROUP | Facility: PHYSICIAN GROUP | Age: 53
End: 2023-01-11
Payer: MEDICARE

## 2023-01-11 ENCOUNTER — PATIENT OUTREACH (OUTPATIENT)
Dept: HEALTH INFORMATION MANAGEMENT | Facility: OTHER | Age: 53
End: 2023-01-11

## 2023-01-11 VITALS
TEMPERATURE: 97.5 F | HEIGHT: 72 IN | BODY MASS INDEX: 26.84 KG/M2 | OXYGEN SATURATION: 97 % | HEART RATE: 85 BPM | DIASTOLIC BLOOD PRESSURE: 62 MMHG | SYSTOLIC BLOOD PRESSURE: 106 MMHG | RESPIRATION RATE: 16 BRPM | WEIGHT: 198.2 LBS

## 2023-01-11 DIAGNOSIS — E11.3599 TYPE 2 DIABETES MELLITUS WITH PROLIFERATIVE RETINOPATHY, WITH LONG-TERM CURRENT USE OF INSULIN, MACULAR EDEMA PRESENCE UNSPECIFIED, UNSPECIFIED LATERALITY, UNSPECIFIED PROLIFERATIVE RETINOPATHY* (HCC): Chronic | ICD-10-CM

## 2023-01-11 DIAGNOSIS — Z89.431 S/P TRANSMETATARSAL AMPUTATION OF FOOT, RIGHT (HCC): ICD-10-CM

## 2023-01-11 DIAGNOSIS — N18.32 STAGE 3B CHRONIC KIDNEY DISEASE: ICD-10-CM

## 2023-01-11 DIAGNOSIS — I10 PRIMARY HYPERTENSION: Chronic | ICD-10-CM

## 2023-01-11 DIAGNOSIS — Z79.4 TYPE 2 DIABETES MELLITUS WITH PROLIFERATIVE RETINOPATHY, WITH LONG-TERM CURRENT USE OF INSULIN, MACULAR EDEMA PRESENCE UNSPECIFIED, UNSPECIFIED LATERALITY, UNSPECIFIED PROLIFERATIVE RETINOPATHY* (HCC): Chronic | ICD-10-CM

## 2023-01-11 DIAGNOSIS — Z79.4 TYPE 2 DIABETES MELLITUS WITH HYPERGLYCEMIA, WITH LONG-TERM CURRENT USE OF INSULIN (HCC): ICD-10-CM

## 2023-01-11 DIAGNOSIS — E11.65 TYPE 2 DIABETES MELLITUS WITH HYPERGLYCEMIA, WITH LONG-TERM CURRENT USE OF INSULIN (HCC): ICD-10-CM

## 2023-01-11 DIAGNOSIS — H35.00 RETINOPATHY: ICD-10-CM

## 2023-01-11 DIAGNOSIS — I50.9 ACUTE HEART FAILURE, UNSPECIFIED HEART FAILURE TYPE (HCC): ICD-10-CM

## 2023-01-11 PROCEDURE — 99214 OFFICE O/P EST MOD 30 MIN: CPT | Performed by: FAMILY MEDICINE

## 2023-01-11 PROCEDURE — 99999 PR NO CHARGE: CPT | Performed by: FAMILY MEDICINE

## 2023-01-11 ASSESSMENT — FIBROSIS 4 INDEX: FIB4 SCORE: 1.05

## 2023-01-11 ASSESSMENT — PATIENT HEALTH QUESTIONNAIRE - PHQ9: CLINICAL INTERPRETATION OF PHQ2 SCORE: 0

## 2023-01-11 NOTE — PROGRESS NOTES
Assessment    Beni is in office today for follow up with PCP. I spoke with Beni following his appointment for [facility] monthly follow up. He said he is overall feeling better. Corie is managing his diabetes and meds and they are changing things as needed to improve diabetic management. Unfortunately his diabetic medications are not affordable for him. He has asked for coverage assistance. Encouraged to contact [employer] to see if they help with PAP assistance and if not to let me know so we can get him hooked up with pharmacotherapy for PAP assistance through [facility]. He said he would contact them today and let me know. He had some questions about his medicare coverage benefits and encouraged him to speak to someone at the medicare store as I do not work for medicare and am not able to give him that information. Gave him the information for the medicare store on [address]. Scheduled him an appointment for follow up with PCP Florinda Pascual. He said he will reach out if any needs or issues come up before his appointment.     Education    Medication review, care gaps, medicare coverage    Care Plan    Progressing    Progress:    Progressing    Next outreach: 2/8/2023

## 2023-01-11 NOTE — PROGRESS NOTES
Subjective:     CC:   Chief Complaint   Patient presents with    Follow-Up       HPI:   Beni presents today for follow-up.  Patient is seen in a endocrinologist at [facility].  Patient states he has been prescribed a number of diabetic medications that are pretty pricey he wanted to speak to the nurse to see if there is a assistance program he can look at.  Patient did see neurology yesterday the plan is for him to continue the Lasix also patient to follow-up with them in .    Past Medical History:   Diagnosis Date    Anesthesia     Hypotension post-op, persisted for a few months    Bowel habit changes     History GI problems    Cataract     IOL - Left eye    Diabetes (HCC)     Oral medications & insulin    Heart burn     GERD; takes Prilosec    Hemorrhagic disorder (HCC)     ASA protection for stent and cardiac history    Hx of heart artery stent     Hyperlipidemia     Hypertension        Social History     Tobacco Use    Smoking status: Former     Packs/day: 0.50     Years: 10.00     Pack years: 5.00     Types: Cigarettes     Quit date:      Years since quittin.0    Smokeless tobacco: Never   Vaping Use    Vaping Use: Never used   Substance Use Topics    Alcohol use: No    Drug use: Not Currently     Types: Marijuana, Oral     Comment: CBD Edibles 3-4 times per week       Current Outpatient Medications Ordered in Epic   Medication Sig Dispense Refill    cholestyramine (QUESTRAN) 4 g packet       MOUNJARO 10 MG/0.5ML Solution Pen-injector       Empagliflozin (JARDIANCE) 25 MG Tab Take  by mouth.      carvedilol (COREG) 3.125 MG Tab Take 1 Tablet by mouth 2 times a day with meals. 180 Tablet 3    enalapril (VASOTEC) 20 MG tablet Take 0.5 Tablets by mouth 2 times a day. 180 Tablet 3    Insulin Degludec 100 UNIT/ML Solution Pen-injector Inject 10 Units under the skin at bedtime.      furosemide (LASIX) 80 MG Tab Take 2 Tablets by mouth every day. 160 mg  Tablet 3    vitamin D2, Ergocalciferol,  "(DRISDOL) 1.25 MG (62967 UT) Cap capsule Take 1 Capsule by mouth every 7 days. 12 Capsule 0    Insulin Syringe-Needle U-100 (BD INSULIN SYRINGE U/F) 30G X 1/2\" 0.3 ML Misc Use one syringe to inject insulin three times daily. 100 Each 0    aspirin EC (ECOTRIN) 81 MG Tablet Delayed Response Take 81 mg by mouth every day.      atorvastatin (LIPITOR) 80 MG tablet Take 80 mg by mouth every evening.      Insulin Pen Needle (PEN NEEDLES) 31G X 5 MM Misc Inject 1 Application under the skin every day. 90 Each 2    omeprazole (PRILOSEC) 20 MG delayed-release capsule Take 1 Capsule by mouth every morning.       No current [facility]-ordered facility-administered medications on file.       Allergies:  Patient has no known allergies.    Health Maintenance: Completed    ROS:  Gen: no fevers/chills, no changes in weight  Eyes: no changes in vision  ENT: no sore throat, no hearing loss, no bloody nose  Pulm: no sob, no cough  CV: no chest pain, no palpitations  GI: no nausea/vomiting, no diarrhea  Neuro: no headaches, no numbness/tingling  Heme/Lymph: no easy bruising    Objective:     Exam:  /62 (BP Location: Right arm, Patient Position: Sitting, BP Cuff Size: Adult)   Pulse 85   Temp 36.4 °C (97.5 °F) (Temporal)   Resp 16   Ht 1.829 m (6')   Wt 89.9 kg (198 lb 3.2 oz)   SpO2 97%   BMI 26.88 kg/m²  Body mass index is 26.88 kg/m².    Gen: Alert and oriented, No apparent distress.  Skin: Warm and dry.  No obvious lesions.  Eyes: Sclera wnl Pupils normal in size  Lungs: Normal effort, CTA bilaterally, no wheezes, rhonchi, or rales  CV: Regular rate and rhythm. No murmurs, rubs, or gallops.  ABD: Soft non-tender no organomegaly  Musculoskeletal: Normal gait. No extremity cyanosis, clubbing, or edema.  Neuro: Oriented to person, place and time  Psych: Mood is wnl       Assessment & Plan:     52 y.o. male with the following -     1. Type 2 diabetes mellitus with proliferative retinopathy, with long-term current use of insulin, " macular edema presence unspecified, unspecified laterality, unspecified proliferative retinopathy* ([facility])  Patient will be continue to go to [facility] also will check and see if there is a special assistant program to help him one other option is maybe refer him to pharmacotherapy here.      2. Primary hypertension  I did review the nephrology note it was recommended if he continues having low blood pressures and syncope to consider decreasing her carvedilol I did discuss this with patient he feels right now his blood pressure is doing a lot better    3. S/P transmetatarsal amputation of foot, right ([facility])  Patient states his foot is doing well we will continue to monitor this this is a chronic problem    4. Acute heart failure, unspecified heart failure type ([facility])  Patient scheduled to see cardiology this summer this is a chronic problem    5. Retinopathy  Patient to continue following up with eye provider which she sees regularly for his retinopathy.       Return in about 4 months (around 5/11/2023).    Please note that this dictation was created using voice recognition software. I have made every reasonable attempt to correct obvious errors, but I expect that there are errors of grammar and possibly content that I did not discover before finalizing the note.

## 2023-02-08 ENCOUNTER — PATIENT OUTREACH (OUTPATIENT)
Dept: HEALTH INFORMATION MANAGEMENT | Facility: OTHER | Age: 53
End: 2023-02-08
Payer: MEDICARE

## 2023-02-08 DIAGNOSIS — N18.32 STAGE 3B CHRONIC KIDNEY DISEASE: ICD-10-CM

## 2023-02-08 DIAGNOSIS — E11.65 TYPE 2 DIABETES MELLITUS WITH HYPERGLYCEMIA, WITH LONG-TERM CURRENT USE OF INSULIN (HCC): ICD-10-CM

## 2023-02-08 DIAGNOSIS — Z79.4 TYPE 2 DIABETES MELLITUS WITH HYPERGLYCEMIA, WITH LONG-TERM CURRENT USE OF INSULIN (HCC): ICD-10-CM

## 2023-02-08 PROCEDURE — 99490 CHRNC CARE MGMT STAFF 1ST 20: CPT | Performed by: FAMILY MEDICINE

## 2023-02-08 NOTE — PROGRESS NOTES
Assessment    Spoke with Beni for [facility] monthly follow up. He said he is doing well. He was able to get his PAP for his diabetic medication done through endocrinology and is just waiting on a response from them. He said otherwise he does not need anything at this time. He said he is currently busy and does not have time to talk but will reach out if he needs anything.     Education    Care gaps, PAP for insulin     Care Plan    Progressing    Progress:    Progressing     Next outreach: 3/8/2023

## 2023-03-16 ENCOUNTER — PATIENT OUTREACH (OUTPATIENT)
Dept: HEALTH INFORMATION MANAGEMENT | Facility: OTHER | Age: 53
End: 2023-03-16
Payer: MEDICARE

## 2023-03-16 DIAGNOSIS — E11.3599 TYPE 2 DIABETES MELLITUS WITH PROLIFERATIVE RETINOPATHY, WITH LONG-TERM CURRENT USE OF INSULIN, MACULAR EDEMA PRESENCE UNSPECIFIED, UNSPECIFIED LATERALITY, UNSPECIFIED PROLIFERATIVE RETINOPATHY* (HCC): Chronic | ICD-10-CM

## 2023-03-16 DIAGNOSIS — Z79.4 TYPE 2 DIABETES MELLITUS WITH HYPERGLYCEMIA, WITH LONG-TERM CURRENT USE OF INSULIN (HCC): ICD-10-CM

## 2023-03-16 DIAGNOSIS — E11.65 TYPE 2 DIABETES MELLITUS WITH HYPERGLYCEMIA, WITH LONG-TERM CURRENT USE OF INSULIN (HCC): ICD-10-CM

## 2023-03-16 DIAGNOSIS — N18.32 STAGE 3B CHRONIC KIDNEY DISEASE: ICD-10-CM

## 2023-03-16 DIAGNOSIS — I10 PRIMARY HYPERTENSION: Chronic | ICD-10-CM

## 2023-03-16 DIAGNOSIS — Z79.4 TYPE 2 DIABETES MELLITUS WITH PROLIFERATIVE RETINOPATHY, WITH LONG-TERM CURRENT USE OF INSULIN, MACULAR EDEMA PRESENCE UNSPECIFIED, UNSPECIFIED LATERALITY, UNSPECIFIED PROLIFERATIVE RETINOPATHY* (HCC): Chronic | ICD-10-CM

## 2023-03-16 PROCEDURE — 99999 PR NO CHARGE: CPT | Performed by: FAMILY MEDICINE

## 2023-03-16 RX ORDER — FUROSEMIDE 80 MG
160 TABLET ORAL DAILY
Qty: 180 TABLET | Refills: 3 | Status: SHIPPED | OUTPATIENT
Start: 2023-03-16 | End: 2023-12-05

## 2023-03-16 NOTE — PROGRESS NOTES
Assessment    Spoke with beni today for CCM monthly follow up. He said he is doing well. His blood sugar numbers are improved and have been around 120's. He said he is feeling good overall. Reviewed appointments, encouraged maintaining healthy diet and exercise. Appointments reviewed, and labs pending. He said he was aware. Beni denies further needs at this time. Will follow up next month and plan to discuss CCM program completion at his PCP appointment on 5/10/2023    Education    Diet, exercise, Appointments    Care Plan    Progressing    Progress:    Progressing    Next outreach: 4/14/2023    Quarterly outreach: Beni is doing well with his diabetic control at this time. He is set up with specialist as well. Will continue to follow up with him for support or needs until his PCP appointment on 5/10/2023.

## 2023-04-12 NOTE — TELEPHONE ENCOUNTER
Is the patient due for a refill? Yes    Was the patient seen the past year? Yes    Date of last office visit: 12/7/2022    Does the patient have an upcoming appointment?  Yes   If yes, When? 6/2/2023    Provider to refill:ADD-CW    Does the patients insurance require a 100 day supply?  No

## 2023-04-13 ENCOUNTER — PATIENT OUTREACH (OUTPATIENT)
Dept: HEALTH INFORMATION MANAGEMENT | Facility: OTHER | Age: 53
End: 2023-04-13
Payer: MEDICARE

## 2023-04-13 DIAGNOSIS — N18.32 STAGE 3B CHRONIC KIDNEY DISEASE: ICD-10-CM

## 2023-04-13 DIAGNOSIS — Z79.4 TYPE 2 DIABETES MELLITUS WITH HYPERGLYCEMIA, WITH LONG-TERM CURRENT USE OF INSULIN (HCC): ICD-10-CM

## 2023-04-13 DIAGNOSIS — E11.65 TYPE 2 DIABETES MELLITUS WITH HYPERGLYCEMIA, WITH LONG-TERM CURRENT USE OF INSULIN (HCC): ICD-10-CM

## 2023-04-13 PROCEDURE — 99490 CHRNC CARE MGMT STAFF 1ST 20: CPT | Performed by: FAMILY MEDICINE

## 2023-04-13 NOTE — PROGRESS NOTES
Attempt # 1 for CCM monthly follow up. LVM with contact information and appointment times.Instructed to call back for any needs.

## 2023-04-21 NOTE — PROGRESS NOTES
Assessment    Spoke to Beni for [facility] monthly follow up. He said he is feeling good and doing well with his diabetes. His numbers have been in the 120's and he is feeling good about that. He follows with [facility] for endocrinology. Beni denied needing resources at this time. Reviewed appointments and will plan to see Beni in clinic while he is here for PCP appointment. Will plan to discuss discharge from CCM program as Beni is difficult to get a hold of at times and he does not use CCM resources.     Education    Care gaps, resources, appointment review    Care Plan    Progressing     Progress:    progressing    Next outreach: 5/10/2023

## 2023-05-03 ENCOUNTER — HOSPITAL ENCOUNTER (OUTPATIENT)
Dept: LAB | Facility: MEDICAL CENTER | Age: 53
End: 2023-05-03
Attending: INTERNAL MEDICINE
Payer: MEDICARE

## 2023-05-03 DIAGNOSIS — D64.9 NORMOCYTIC ANEMIA: ICD-10-CM

## 2023-05-03 DIAGNOSIS — N18.32 STAGE 3B CHRONIC KIDNEY DISEASE: ICD-10-CM

## 2023-05-03 DIAGNOSIS — E55.9 VITAMIN D DEFICIENCY: ICD-10-CM

## 2023-05-03 DIAGNOSIS — E11.3599 TYPE 2 DIABETES MELLITUS WITH PROLIFERATIVE RETINOPATHY, WITH LONG-TERM CURRENT USE OF INSULIN, MACULAR EDEMA PRESENCE UNSPECIFIED, UNSPECIFIED LATERALITY, UNSPECIFIED PROLIFERATIVE RETINOPATHY* (HCC): Chronic | ICD-10-CM

## 2023-05-03 DIAGNOSIS — Z79.4 TYPE 2 DIABETES MELLITUS WITH PROLIFERATIVE RETINOPATHY, WITH LONG-TERM CURRENT USE OF INSULIN, MACULAR EDEMA PRESENCE UNSPECIFIED, UNSPECIFIED LATERALITY, UNSPECIFIED PROLIFERATIVE RETINOPATHY* (HCC): Chronic | ICD-10-CM

## 2023-05-03 DIAGNOSIS — N25.81 HYPERPARATHYROIDISM DUE TO RENAL INSUFFICIENCY (HCC): ICD-10-CM

## 2023-05-03 LAB
25(OH)D3 SERPL-MCNC: 11 NG/ML (ref 30–100)
ALBUMIN SERPL BCP-MCNC: 2.5 G/DL (ref 3.2–4.9)
ANION GAP SERPL CALC-SCNC: 10 MMOL/L (ref 7–16)
APPEARANCE UR: CLEAR
BACTERIA #/AREA URNS HPF: NEGATIVE /HPF
BILIRUB UR QL STRIP.AUTO: NEGATIVE
BUN SERPL-MCNC: 29 MG/DL (ref 8–22)
CALCIUM SERPL-MCNC: 7.9 MG/DL (ref 8.5–10.5)
CHLORIDE SERPL-SCNC: 104 MMOL/L (ref 96–112)
CO2 SERPL-SCNC: 25 MMOL/L (ref 20–33)
COLOR UR: YELLOW
CREAT SERPL-MCNC: 2.29 MG/DL (ref 0.5–1.4)
EPI CELLS #/AREA URNS HPF: NEGATIVE /HPF
ERYTHROCYTE [DISTWIDTH] IN BLOOD BY AUTOMATED COUNT: 39.1 FL (ref 35.9–50)
FERRITIN SERPL-MCNC: 331 NG/ML (ref 22–322)
GFR SERPLBLD CREATININE-BSD FMLA CKD-EPI: 33 ML/MIN/1.73 M 2
GLUCOSE SERPL-MCNC: 157 MG/DL (ref 65–99)
GLUCOSE UR STRIP.AUTO-MCNC: 500 MG/DL
HCT VFR BLD AUTO: 34.8 % (ref 42–52)
HGB BLD-MCNC: 12.1 G/DL (ref 14–18)
HYALINE CASTS #/AREA URNS LPF: ABNORMAL /LPF
IRON SATN MFR SERPL: 25 % (ref 15–55)
IRON SERPL-MCNC: 61 UG/DL (ref 50–180)
KETONES UR STRIP.AUTO-MCNC: NEGATIVE MG/DL
LEUKOCYTE ESTERASE UR QL STRIP.AUTO: NEGATIVE
MCH RBC QN AUTO: 29.9 PG (ref 27–33)
MCHC RBC AUTO-ENTMCNC: 34.8 G/DL (ref 33.7–35.3)
MCV RBC AUTO: 85.9 FL (ref 81.4–97.8)
MICRO URNS: ABNORMAL
NITRITE UR QL STRIP.AUTO: NEGATIVE
PH UR STRIP.AUTO: 6.5 [PH] (ref 5–8)
PHOSPHATE SERPL-MCNC: 3.9 MG/DL (ref 2.5–4.5)
PLATELET # BLD AUTO: 276 K/UL (ref 164–446)
PMV BLD AUTO: 11.2 FL (ref 9–12.9)
POTASSIUM SERPL-SCNC: 3 MMOL/L (ref 3.6–5.5)
PROT UR QL STRIP: 300 MG/DL
PTH-INTACT SERPL-MCNC: 166 PG/ML (ref 14–72)
RBC # BLD AUTO: 4.05 M/UL (ref 4.7–6.1)
RBC # URNS HPF: ABNORMAL /HPF
RBC UR QL AUTO: ABNORMAL
SODIUM SERPL-SCNC: 139 MMOL/L (ref 135–145)
SP GR UR STRIP.AUTO: 1.01
TIBC SERPL-MCNC: 240 UG/DL (ref 250–450)
UIBC SERPL-MCNC: 179 UG/DL (ref 110–370)
UROBILINOGEN UR STRIP.AUTO-MCNC: 0.2 MG/DL
WBC # BLD AUTO: 10.9 K/UL (ref 4.8–10.8)
WBC #/AREA URNS HPF: ABNORMAL /HPF

## 2023-05-03 PROCEDURE — 80048 BASIC METABOLIC PNL TOTAL CA: CPT

## 2023-05-03 PROCEDURE — 82306 VITAMIN D 25 HYDROXY: CPT

## 2023-05-03 PROCEDURE — 82570 ASSAY OF URINE CREATININE: CPT | Mod: 91

## 2023-05-03 PROCEDURE — 84100 ASSAY OF PHOSPHORUS: CPT

## 2023-05-03 PROCEDURE — 83540 ASSAY OF IRON: CPT

## 2023-05-03 PROCEDURE — 85027 COMPLETE CBC AUTOMATED: CPT

## 2023-05-03 PROCEDURE — 83550 IRON BINDING TEST: CPT

## 2023-05-03 PROCEDURE — 83970 ASSAY OF PARATHORMONE: CPT

## 2023-05-03 PROCEDURE — 84156 ASSAY OF PROTEIN URINE: CPT

## 2023-05-03 PROCEDURE — 36415 COLL VENOUS BLD VENIPUNCTURE: CPT

## 2023-05-03 PROCEDURE — 82043 UR ALBUMIN QUANTITATIVE: CPT

## 2023-05-03 PROCEDURE — 82728 ASSAY OF FERRITIN: CPT

## 2023-05-03 PROCEDURE — 81001 URINALYSIS AUTO W/SCOPE: CPT

## 2023-05-03 PROCEDURE — 82040 ASSAY OF SERUM ALBUMIN: CPT

## 2023-05-04 LAB
CREAT UR-MCNC: 27.24 MG/DL
CREAT UR-MCNC: 27.57 MG/DL
MICROALBUMIN UR-MCNC: 156.5 MG/DL
MICROALBUMIN/CREAT UR: 5745 MG/G (ref 0–30)
PROT UR-MCNC: 258 MG/DL (ref 0–15)
PROT/CREAT UR: 9358 MG/G (ref 15–68)

## 2023-05-09 ENCOUNTER — OFFICE VISIT (OUTPATIENT)
Dept: NEPHROLOGY | Facility: MEDICAL CENTER | Age: 53
End: 2023-05-09
Payer: MEDICARE

## 2023-05-09 VITALS
DIASTOLIC BLOOD PRESSURE: 82 MMHG | TEMPERATURE: 97.8 F | RESPIRATION RATE: 18 BRPM | OXYGEN SATURATION: 98 % | SYSTOLIC BLOOD PRESSURE: 122 MMHG | HEIGHT: 72 IN | BODY MASS INDEX: 26.14 KG/M2 | HEART RATE: 69 BPM | WEIGHT: 193 LBS

## 2023-05-09 DIAGNOSIS — D64.9 ANEMIA, UNSPECIFIED TYPE: ICD-10-CM

## 2023-05-09 DIAGNOSIS — I10 PRIMARY HYPERTENSION: Chronic | ICD-10-CM

## 2023-05-09 DIAGNOSIS — Z79.4 TYPE 2 DIABETES MELLITUS WITH PROLIFERATIVE RETINOPATHY, WITH LONG-TERM CURRENT USE OF INSULIN, MACULAR EDEMA PRESENCE UNSPECIFIED, UNSPECIFIED LATERALITY, UNSPECIFIED PROLIFERATIVE RETINOPATHY* (HCC): Chronic | ICD-10-CM

## 2023-05-09 DIAGNOSIS — N25.81 HYPERPARATHYROIDISM DUE TO RENAL INSUFFICIENCY (HCC): ICD-10-CM

## 2023-05-09 DIAGNOSIS — N18.32 STAGE 3B CHRONIC KIDNEY DISEASE: ICD-10-CM

## 2023-05-09 DIAGNOSIS — E11.3599 TYPE 2 DIABETES MELLITUS WITH PROLIFERATIVE RETINOPATHY, WITH LONG-TERM CURRENT USE OF INSULIN, MACULAR EDEMA PRESENCE UNSPECIFIED, UNSPECIFIED LATERALITY, UNSPECIFIED PROLIFERATIVE RETINOPATHY* (HCC): Chronic | ICD-10-CM

## 2023-05-09 DIAGNOSIS — G56.03 BILATERAL CARPAL TUNNEL SYNDROME: ICD-10-CM

## 2023-05-09 DIAGNOSIS — E55.9 VITAMIN D DEFICIENCY: ICD-10-CM

## 2023-05-09 PROBLEM — E87.1 HYPONATREMIA: Status: RESOLVED | Noted: 2021-08-19 | Resolved: 2023-05-09

## 2023-05-09 PROCEDURE — 99214 OFFICE O/P EST MOD 30 MIN: CPT | Performed by: INTERNAL MEDICINE

## 2023-05-09 RX ORDER — DULAGLUTIDE 4.5 MG/.5ML
INJECTION, SOLUTION SUBCUTANEOUS
COMMUNITY
End: 2023-12-06

## 2023-05-09 RX ORDER — INSULIN DEGLUDEC 100 U/ML
36 INJECTION, SOLUTION SUBCUTANEOUS EVERY EVENING
Status: ON HOLD | COMMUNITY
End: 2024-02-05

## 2023-05-09 RX ORDER — ERGOCALCIFEROL 1.25 MG/1
50000 CAPSULE ORAL
Qty: 12 CAPSULE | Refills: 0 | Status: SHIPPED | OUTPATIENT
Start: 2023-05-09 | End: 2023-12-06

## 2023-05-09 ASSESSMENT — ENCOUNTER SYMPTOMS
ABDOMINAL PAIN: 0
SHORTNESS OF BREATH: 0
FEVER: 0

## 2023-05-09 ASSESSMENT — FIBROSIS 4 INDEX: FIB4 SCORE: 1.17

## 2023-05-09 NOTE — PATIENT INSTRUCTIONS
"If you would like to learn more about kidney failure and the options for patients with kidney failure, I recommend viewing a YouTube video by Dr. Costa Aragon about dialysis options.  You can find this easily by searching Google for \"YouTube, Dr. Costa Aragon, dialysis.\"  The video is about 10 minutes long.     Watch out for symptoms of kidney toxins (we call them \"uremic toxins\") building up. The symptoms of uremic toxin buildup include loss of appetite, metallic taste in the mouth, and persistent headache, nausea, and vomiting lasting more than a week or two. If you develop these symptoms, this might mean you need dialysis very soon and should call our office or possibly go to the emergency room if you can't reach us.    "

## 2023-05-09 NOTE — PROGRESS NOTES
"Chief Complaint   Patient presents with    Follow-Up     Stage 3b ckd, [facility] labs       CC: f/u CKD, diabetic nephropathy, chronic TMA    HPI:  Beni Moore is a 52 y.o. male with diabetes complicated by retinopathy requiring injections, hypertension, CKD due to biopsy-proven diabetic nephropathy and chronic TMA who presents today for follow-up.    He was hospitalized in Nov/Dec, 2022 with anasarca, improved with high dose diuretics.     Re: DM2, diagnosed 1998. Patient has had microalbuminuria since at least 2018. Patient has seen an eye doctor and does have retinopathy requiring Eyelea injections (Dr. Yosef Dumont then changed to Dr. Perla LIRA) since 2020 and it has saved his vision. He has also had some foot amputations. He thinks his sugars are 110-120's fasting. He's on Jardiance, Trulicity, and Tresiba long acting insulin. He sees Dr. Perla LIRA with ophtho and still gets Eylea injections every 4-6 weeks, last injections yesterday. He is also planning on cataract surgery in may, 2023.     Re: HTN, diagnosed 2018. BP control over the years has been controlled, until mid-late 2021. Checks BP at home, has been averaging 126/81. He's on enalapril 10mg bid, coreg 3.125 bid, and he is just taking lasix 160mg once or twice a day. He got his weight down to 192-193lbs nude. He used to be 280-290 lbs in 2/2022.      Re: CKD, denies history of AXEL, kidney stone.  He denies LUTS. Denies NSAIDs. Appetite is \"good.\" Denies nausea, vomiting. Denies headaches. Has occasional metallic taste.     Re: CAD, he had MI in 9/2020 and was started on coreg. He's also on aspirin.       Past Medical History:   Diagnosis Date    Anesthesia     Hypotension post-op, persisted for a few months    Bowel habit changes     History GI problems    Cataract     IOL - Left eye    Diabetes (HCC)     Oral medications & insulin    Heart burn     GERD; takes Prilosec    Hemorrhagic disorder (HCC)     ASA protection for stent and cardiac history    Hx " of heart artery stent 2019    Hyperlipidemia     Hypertension        Past Surgical History:   Procedure Laterality Date    TOE AMPUTATION Left 9/30/2021    Procedure: AMPUTATION, TOE;  Surgeon: Tomer Hart M.D.;  Location: SURGERY [facility];  Service: Orthopedics    STENT PLACEMENT  2019    STEMI with nonobstructive LAD    TOE AMPUTATION Right 7/2/2018    Procedure: Transmetatarsal amputation;  Surgeon: Ravi Bernardo M.D.;  Location: SURGERY [facility];  Service: Orthopedics    ACHILLES TENDON REPAIR Right 7/2/2018    Procedure: ACHILLES LENGTHENING;  Surgeon: Ravi Bernardo M.D.;  Location: SURGERY [facility];  Service: Orthopedics    IRRIGATION & DEBRIDEMENT ORTHO Right 7/2/2018    Procedure: IRRIGATION & DEBRIDEMENT ORTHO;  Surgeon: Ravi Bernardo M.D.;  Location: SURGERY [facility];  Service: Orthopedics    OTHER Left     IOL    OTHER ABDOMINAL SURGERY      OTHER ORTHOPEDIC SURGERY      TONSILLECTOMY      VASECTOMY          Outpatient Encounter Medications as of 5/9/2023   Medication Sig Dispense Refill    Dulaglutide (TRULICITY) 4.5 MG/0.5ML Solution Pen-injector Inject  under the skin. 1 injection per week      Insulin Degludec (TRESIBA FLEXTOUCH) 100 UNIT/ML Solution Pen-injector Inject  under the skin. 18 units at night      aspirin EC (ECOTRIN) 81 MG Tablet Delayed Response Take 1 Tablet by mouth every day. 90 Tablet 2    furosemide (LASIX) 80 MG Tab Take 2 Tablets by mouth every day. 160 mg  Tablet 3    enalapril (VASOTEC) 20 MG tablet Take 0.5 Tablets by mouth 2 times a day. 90 Tablet 3    carvedilol (COREG) 3.125 MG Tab Take 1 Tablet by mouth 2 times a day with meals. 180 Tablet 3    cholestyramine (QUESTRAN) 4 g packet       Empagliflozin (JARDIANCE) 25 MG Tab Take  by mouth.      vitamin D2, Ergocalciferol, (DRISDOL) 1.25 MG (00493 UT) Cap capsule Take 1 Capsule by mouth every 7 days. 12 Capsule 0    atorvastatin (LIPITOR) 80 MG tablet Take 80 mg by mouth every  "evening.      omeprazole (PRILOSEC) 20 MG delayed-release capsule Take 1 Capsule by mouth every morning.      [DISCONTINUED] MOUNJARO 10 MG/0.5ML Solution Pen-injector       [DISCONTINUED] Insulin Degludec 100 UNIT/ML Solution Pen-injector Inject 10 Units under the skin at bedtime. (Patient not taking: Reported on 5/9/2023)      [DISCONTINUED] Insulin Syringe-Needle U-100 (BD INSULIN SYRINGE U/F) 30G X 1/2\" 0.3 ML Misc Use one syringe to inject insulin three times daily. (Patient not taking: Reported on 5/9/2023) 100 Each 0    [DISCONTINUED] Insulin Pen Needle (PEN NEEDLES) 31G X 5 MM Misc Inject 1 Application under the skin every day. (Patient not taking: Reported on 5/9/2023) 90 Each 2     No facility-administered encounter medications on file as of 5/9/2023.        No Known Allergies          Review of Systems   Constitutional:  Negative for fever.   Respiratory:  Negative for shortness of breath.    Cardiovascular:  Negative for chest pain.   Gastrointestinal:  Negative for abdominal pain.   Genitourinary:  Negative for dysuria.   All other systems reviewed and are negative.    /82 (BP Location: Right arm, Patient Position: Sitting, BP Cuff Size: Adult)   Pulse 69   Temp 36.6 °C (97.8 °F) (Temporal)   Resp 18   Ht 1.829 m (6')   Wt 87.5 kg (193 lb)   SpO2 98%   BMI 26.18 kg/m²     Physical Exam  Constitutional:       General: He is not in acute distress.  HENT:      Mouth/Throat:      Pharynx: No oropharyngeal exudate.   Eyes:      General: No scleral icterus.  Neck:      Trachea: No tracheal deviation.   Cardiovascular:      Rate and Rhythm: Normal rate and regular rhythm.      Heart sounds: Normal heart sounds. No murmur heard.  Pulmonary:      Effort: Pulmonary effort is normal.      Breath sounds: Normal breath sounds. No stridor. No rales.   Abdominal:      General: Bowel sounds are normal.      Palpations: Abdomen is soft.      Tenderness: There is no abdominal tenderness.   Musculoskeletal:   "       General: Normal range of motion.      Cervical back: Neck supple.      Right lower leg: Edema (trace) present.      Left lower leg: Edema (trace) present.   Skin:     General: Skin is warm and dry.      Findings: No rash.   Neurological:      General: No focal deficit present.      Mental Status: He is alert and oriented to person, place, and time.   Psychiatric:         Mood and Affect: Mood and affect normal.         Behavior: Behavior normal.         Labs reviewed.    Recent Labs     12/04/22  0048 12/19/22  1045 12/19/22  1052 05/03/23  1213   ALBUMIN  --  2.6* 2.6* 2.5*   SODIUM 136 137 136 139   POTASSIUM 3.4* 4.0 4.0 3.0*   CHLORIDE 102 102 102 104   CO2 23 26 26 25   BUN 34* 35* 35* 29*   CREATININE 1.80* 2.15* 2.16* 2.29*   PHOSPHORUS 2.8 5.0*  --  3.9       Lab Results   Component Value Date/Time    WBC 10.9 (H) 05/03/2023 12:13 PM    RBC 4.05 (L) 05/03/2023 12:13 PM    HEMOGLOBIN 12.1 (L) 05/03/2023 12:13 PM    HEMATOCRIT 34.8 (L) 05/03/2023 12:13 PM    MCV 85.9 05/03/2023 12:13 PM    MCH 29.9 05/03/2023 12:13 PM    MCHC 34.8 05/03/2023 12:13 PM    MPV 11.2 05/03/2023 12:13 PM              URINALYSIS:  Lab Results   Component Value Date/Time    COLORURINE Yellow 05/03/2023 1210    CLARITY Clear 05/03/2023 1210    SPECGRAVITY 1.012 05/03/2023 1210    PHURINE 6.5 05/03/2023 1210    KETONES Negative 05/03/2023 1210    PROTEINURIN 300 (A) 05/03/2023 1210    BILIRUBINUR Negative 05/03/2023 1210    UROBILU 0.2 05/03/2023 1210    NITRITE Negative 05/03/2023 1210    LEUKESTERAS Negative 05/03/2023 1210    OCCULTBLOOD Small (A) 05/03/2023 1210       UPC  Lab Results   Component Value Date/Time    TOTPROTUR 258.0 (H) 05/03/2023 1210      Lab Results   Component Value Date/Time    CREATININEU 27.57 05/03/2023 1210           Imaging reviewed  No orders to display       Kidney biopsy 3/29/2022 interpreted by Dr. Ac  Diagnosis: Chronic glomerular microangiopathic changes.  Nodular diabetic  glomerulosclerosis.  Comment: In light of the clinical history, the microangiopathic changes are likely secondary to anti-VEGF inhibitor use.  Other causes of chronic thrombotic microangiopathy are also on the differential.  There are no acute thrombi.  Light microscopy notable for approximately 50% interstitial fibrosis and tubular atrophy as well as evidence of acute tubular injury      Assessment:  Beni Moore is a 52 y.o. male with diabetes complicated by retinopathy requiring injections, hypertension, CKD due to biopsy-proven diabetic nephropathy and chronic TMA who presents today for follow-up.    Plan:  1. CKD stage 3b.    -Creatinine and GFR remained stable within stage IIIb CKD.  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  I recommend avoid NSAIDs and other nephrotoxins.  I recommend low-sodium diet.  I explained the importance of glycemic and blood pressure control to help slow CKD progression.  Patient is at high risk of CKD progression to ESRD with his nephrotic range proteinuria and ongoing ocular injections.  Patient says he would rather go on dialysis and then go blind, and is willing to accept the risk of further anti-vegF ocular injections.  Patient thinks he would prefer home peritoneal dialysis if and when he needs dialysis, I also think he could be a candidate for transplant.  I will plan on transplant referral when GFR is consistently less than 20.  Maintain medical therapy with ACE inhibitor and Jardiance.    2. Type 2 diabetes mellitus with hyperglycemia, with long-term current use of insulin (HCC)  -Better controlled per patient.  Maintain ACE inhibitor and Jardiance for long-term kidney protection.  Defer further diabetes management to primary care and/or endocrinology.    3. Primary hypertension  -Fairly well controlled.  Continue Lasix 160 mg daily or twice daily as needed to maintain volume status.   Maintain enalapril 10 mg p.o. twice daily.      4.  Anemia, unspecified  - Anemia persists, patient is iron replete.  No acute need for Retacrit injections with hemoglobin over 10.  Continue to monitor CBC and iron studies.    5.  Hyperparathyroidism and vitamin D deficiency  - Remains vitamin D deficient.  I will prescribe another 12-week course of weekly ergocalciferol 50,000 units.      Return to clinic 4 months with preclinic labs    Varun Ortiz MD  Nephrology

## 2023-05-10 ENCOUNTER — PATIENT OUTREACH (OUTPATIENT)
Dept: HEALTH INFORMATION MANAGEMENT | Facility: OTHER | Age: 53
End: 2023-05-10

## 2023-05-10 ENCOUNTER — OFFICE VISIT (OUTPATIENT)
Dept: MEDICAL GROUP | Facility: PHYSICIAN GROUP | Age: 53
End: 2023-05-10
Payer: MEDICARE

## 2023-05-10 ENCOUNTER — PATIENT MESSAGE (OUTPATIENT)
Dept: HEALTH INFORMATION MANAGEMENT | Facility: OTHER | Age: 53
End: 2023-05-10

## 2023-05-10 VITALS
TEMPERATURE: 97.8 F | WEIGHT: 191.2 LBS | HEART RATE: 72 BPM | OXYGEN SATURATION: 99 % | BODY MASS INDEX: 25.9 KG/M2 | HEIGHT: 72 IN | RESPIRATION RATE: 18 BRPM | DIASTOLIC BLOOD PRESSURE: 60 MMHG | SYSTOLIC BLOOD PRESSURE: 94 MMHG

## 2023-05-10 DIAGNOSIS — N25.81 HYPERPARATHYROIDISM DUE TO RENAL INSUFFICIENCY (HCC): ICD-10-CM

## 2023-05-10 DIAGNOSIS — E11.65 TYPE 2 DIABETES MELLITUS WITH HYPERGLYCEMIA, WITH LONG-TERM CURRENT USE OF INSULIN (HCC): ICD-10-CM

## 2023-05-10 DIAGNOSIS — I10 PRIMARY HYPERTENSION: Chronic | ICD-10-CM

## 2023-05-10 DIAGNOSIS — H35.00 RETINOPATHY: ICD-10-CM

## 2023-05-10 DIAGNOSIS — I25.10 CORONARY ARTERY DISEASE INVOLVING NATIVE CORONARY ARTERY OF NATIVE HEART WITHOUT ANGINA PECTORIS: ICD-10-CM

## 2023-05-10 DIAGNOSIS — N18.32 STAGE 3B CHRONIC KIDNEY DISEASE: ICD-10-CM

## 2023-05-10 DIAGNOSIS — E78.5 HYPERLIPIDEMIA, UNSPECIFIED HYPERLIPIDEMIA TYPE: ICD-10-CM

## 2023-05-10 DIAGNOSIS — E11.621 TYPE 2 DIABETES MELLITUS WITH FOOT ULCER (CODE) (HCC): ICD-10-CM

## 2023-05-10 DIAGNOSIS — Z79.4 TYPE 2 DIABETES MELLITUS WITH HYPERGLYCEMIA, WITH LONG-TERM CURRENT USE OF INSULIN (HCC): ICD-10-CM

## 2023-05-10 PROBLEM — E11.628 DIABETIC FOOT INFECTION (HCC): Status: RESOLVED | Noted: 2021-09-30 | Resolved: 2023-05-10

## 2023-05-10 PROBLEM — R60.1 ANASARCA: Status: RESOLVED | Noted: 2022-11-27 | Resolved: 2023-05-10

## 2023-05-10 PROBLEM — A41.9 SEPSIS (HCC): Status: RESOLVED | Noted: 2021-08-19 | Resolved: 2023-05-10

## 2023-05-10 PROBLEM — D72.829 LEUKOCYTOSIS: Status: RESOLVED | Noted: 2022-11-30 | Resolved: 2023-05-10

## 2023-05-10 PROBLEM — E87.6 HYPOKALEMIA: Status: RESOLVED | Noted: 2021-10-03 | Resolved: 2023-05-10

## 2023-05-10 PROBLEM — R22.43 LOCALIZED SWELLING OF BOTH LOWER LEGS: Status: RESOLVED | Noted: 2022-03-03 | Resolved: 2023-05-10

## 2023-05-10 PROBLEM — L08.9 DIABETIC FOOT INFECTION (HCC): Status: RESOLVED | Noted: 2021-09-30 | Resolved: 2023-05-10

## 2023-05-10 PROBLEM — E83.42 HYPOMAGNESEMIA: Status: RESOLVED | Noted: 2021-10-01 | Resolved: 2023-05-10

## 2023-05-10 LAB
HBA1C MFR BLD: 9.3 % (ref ?–5.8)
POCT INT CON NEG: NEGATIVE
POCT INT CON POS: POSITIVE

## 2023-05-10 PROCEDURE — 99214 OFFICE O/P EST MOD 30 MIN: CPT | Performed by: FAMILY MEDICINE

## 2023-05-10 PROCEDURE — 83036 HEMOGLOBIN GLYCOSYLATED A1C: CPT | Performed by: FAMILY MEDICINE

## 2023-05-10 PROCEDURE — 99999 PR NO CHARGE: CPT | Performed by: FAMILY MEDICINE

## 2023-05-10 ASSESSMENT — FIBROSIS 4 INDEX: FIB4 SCORE: 1.17

## 2023-05-10 NOTE — PROGRESS NOTES
Subjective:     CC:   Chief Complaint   Patient presents with    Follow-Up       HPI:   Beni presents today for follow-up.  Patient is planning on getting some cataract surgery done in the next month.  Patient is also getting eye injections due to the fact that he is vascular changes in his eyes due to his diabetes.  Patient is seen in the renal specialist for his kidneys.  Patient also has appointment coming up with a cardiologist for his heart.  Patient was referred to Ortho by the kidney specialist due to the fact that he has atrophy to some his muscles.  Patient is seeing Decon for his diabetes patient did mention to me he has not had a hemoglobin A1c checked in a number of months.    Past Medical History:   Diagnosis Date    Anesthesia     Hypotension post-op, persisted for a few months    Bowel habit changes     History GI problems    Cataract     IOL - Left eye    Diabetes (HCC)     Oral medications & insulin    Heart burn     GERD; takes Prilosec    Hemorrhagic disorder (HCC)     ASA protection for stent and cardiac history    Hx of heart artery stent     Hyperlipidemia     Hypertension        Social History     Tobacco Use    Smoking status: Former     Packs/day: 0.50     Years: 10.00     Pack years: 5.00     Types: Cigarettes     Quit date:      Years since quittin.3    Smokeless tobacco: Never   Vaping Use    Vaping Use: Never used   Substance Use Topics    Alcohol use: No    Drug use: Not Currently     Types: Marijuana, Oral     Comment: CBD Edibles 3-4 times per week       Current Outpatient Medications Ordered in Epic   Medication Sig Dispense Refill    Dulaglutide (TRULICITY) 4.5 MG/0.5ML Solution Pen-injector Inject  under the skin. 1 injection per week      Insulin Degludec (TRESIBA FLEXTOUCH) 100 UNIT/ML Solution Pen-injector Inject  under the skin. 18 units at night      vitamin D2, Ergocalciferol, (DRISDOL) 1.25 MG (90404 UT) Cap capsule Take 1 Capsule by mouth every 7 days. 12  Capsule 0    aspirin EC (ECOTRIN) 81 MG Tablet Delayed Response Take 1 Tablet by mouth every day. 90 Tablet 2    furosemide (LASIX) 80 MG Tab Take 2 Tablets by mouth every day. 160 mg  Tablet 3    enalapril (VASOTEC) 20 MG tablet Take 0.5 Tablets by mouth 2 times a day. 90 Tablet 3    carvedilol (COREG) 3.125 MG Tab Take 1 Tablet by mouth 2 times a day with meals. 180 Tablet 3    cholestyramine (QUESTRAN) 4 g packet       Empagliflozin (JARDIANCE) 25 MG Tab Take  by mouth.      atorvastatin (LIPITOR) 80 MG tablet Take 80 mg by mouth every evening.      omeprazole (PRILOSEC) 20 MG delayed-release capsule Take 1 Capsule by mouth every morning.       No current [facility]-ordered facility-administered medications on file.       Allergies:  Patient has no known allergies.    Health Maintenance: Completed    ROS:  Gen: no fevers/chills, no changes in weight  Eyes: no changes in vision  ENT: no sore throat, no hearing loss, no bloody nose  Pulm: no sob, no cough  CV: no chest pain, no palpitations  GI: no nausea/vomiting, no diarrhea  : no dysuria  Neuro: no headaches, no numbness/tingling  Heme/Lymph: no easy bruising    Objective:     Exam:  BP 94/60 (BP Location: Left arm, Patient Position: Sitting, BP Cuff Size: Adult)   Pulse 72   Temp 36.6 °C (97.8 °F) (Temporal)   Resp 18   Ht 1.829 m (6')   Wt 86.7 kg (191 lb 3.2 oz)   SpO2 99%   BMI 25.93 kg/m²  Body mass index is 25.93 kg/m².    Gen: Alert and oriented, No apparent distress.  Skin: Warm and dry.  No obvious lesions.  Eyes: Sclera wnl Pupils normal in size  Lungs: Normal effort, CTA bilaterally, no wheezes, rhonchi, or rales  CV: Regular rate and rhythm. No murmurs, rubs, or gallops.  Musculoskeletal: Normal gait. No extremity cyanosis, clubbing, or edema.  Neuro: Oriented to person, place and time  Psych: Mood is wnl     Labs: Patient does have a number of labs that have been ordered by his nephrologist he supposed to get it in about 2 to 3 months would  recommend getting his lipid panel done then I will add that    Assessment & Plan:     52 y.o. male with the following -     1. Type 2 diabetes mellitus with foot ulcer (CODE) ([facility])  Patient was notified that his hemoglobin A1c is 9.3 I did remark to patient it is a 3-month average so he can let Decon know since the they are tracking his blood sugars.    2. Stage 3b chronic kidney disease ([facility])  Patient to follow-up with nephrology as planned    3. Coronary artery disease involving native coronary artery of native heart without angina pectoris  Patient does have a cardiology appointment in June    4. Hyperparathyroidism due to renal insufficiency ([facility])  Patient is seeing nephrology at this time    5. Primary hypertension  Patient to continue following up with cardiology they are prescribing his medications  6. Retinopathy  Patient to follow-up with his eye provider    7. Hyperlipidemia, unspecified hyperlipidemia type  - Lipid Profile; Future       Return in about 4 months (around 9/10/2023), or if symptoms worsen or fail to improve.    Please note that this dictation was created using voice recognition software. I have made every reasonable attempt to correct obvious errors, but I expect that there are errors of grammar and possibly content that I did not discover before finalizing the note.

## 2023-05-10 NOTE — PROGRESS NOTES
Assessment    Beni is in office today to see PCP for follow up. Spoke with Beni following his appointment for CCM monthly follow up. Beni said he is doing well. He feels he has things under control, he has lost around 90 pounds and he says he feels good. He said his blood sugars are ranging 115-120's in the morning. He follows Decon for endocrinology and will see them on Monday 5/15/2023. He is supposed to be on Jardiance and Mounjaro but cannot afford it. He has been given the Jardiance via samples so he has taken those. Today in office his A1C is 9.3. He is surprised about this as he has been working on his diet and he feels good. He said he will speak to the endocrinologist about it and see what needs to be done for medications. Spoke to Beni today about CCM resources and he denies needs. We discussed CCM discharge as he does not use CCM services and resources now that he is hooked up with endocrinology and his specialty providers. He uses [employer] for messages and is able to take care of his medical needs. Will Discharge Beni off CCM program at this time and he knows if his situation changes he can call for assistance or his needs.     Education    Diet, exercise, resources, care gaps, appointments    Plan of Care and Goals    Progressing: Goals are exercise and diabetic control closed today. Beni will continue to work on his diabetic control with his endocrinologist.    Barriers:    Age, habits, medication cost, medical diagnosis    Progress:    Progressing    Next outreach:CCM Closed    Quarterly Outreach: Beni is following his specialist routinely, He does not use CCM services and is not active in the CCM program. Discussed CCM discharge today and he is in agreement knowing he can call if something comes up.

## 2023-06-02 ENCOUNTER — OFFICE VISIT (OUTPATIENT)
Dept: CARDIOLOGY | Facility: MEDICAL CENTER | Age: 53
End: 2023-06-02
Attending: INTERNAL MEDICINE
Payer: MEDICARE

## 2023-06-02 VITALS
BODY MASS INDEX: 28.17 KG/M2 | OXYGEN SATURATION: 99 % | RESPIRATION RATE: 12 BRPM | SYSTOLIC BLOOD PRESSURE: 139 MMHG | HEIGHT: 72 IN | HEART RATE: 80 BPM | DIASTOLIC BLOOD PRESSURE: 89 MMHG | WEIGHT: 208 LBS

## 2023-06-02 DIAGNOSIS — I51.89 LEFT VENTRICULAR SYSTOLIC DYSFUNCTION (LVSD), NYHA CLASS 2: ICD-10-CM

## 2023-06-02 DIAGNOSIS — E78.5 HYPERLIPIDEMIA, UNSPECIFIED HYPERLIPIDEMIA TYPE: ICD-10-CM

## 2023-06-02 DIAGNOSIS — I50.30 ACC/AHA STAGE C HEART FAILURE WITH PRESERVED EJECTION FRACTION (HCC): ICD-10-CM

## 2023-06-02 DIAGNOSIS — I25.10 CORONARY ARTERY DISEASE INVOLVING NATIVE CORONARY ARTERY OF NATIVE HEART WITHOUT ANGINA PECTORIS: ICD-10-CM

## 2023-06-02 DIAGNOSIS — N18.31 STAGE 3A CHRONIC KIDNEY DISEASE: ICD-10-CM

## 2023-06-02 DIAGNOSIS — E11.65 TYPE 2 DIABETES MELLITUS WITH HYPERGLYCEMIA, WITH LONG-TERM CURRENT USE OF INSULIN (HCC): ICD-10-CM

## 2023-06-02 DIAGNOSIS — Z79.899 HIGH RISK MEDICATION USE: ICD-10-CM

## 2023-06-02 DIAGNOSIS — Z79.4 TYPE 2 DIABETES MELLITUS WITH HYPERGLYCEMIA, WITH LONG-TERM CURRENT USE OF INSULIN (HCC): ICD-10-CM

## 2023-06-02 DIAGNOSIS — I10 PRIMARY HYPERTENSION: ICD-10-CM

## 2023-06-02 PROCEDURE — 3079F DIAST BP 80-89 MM HG: CPT | Performed by: INTERNAL MEDICINE

## 2023-06-02 PROCEDURE — 99215 OFFICE O/P EST HI 40 MIN: CPT | Performed by: INTERNAL MEDICINE

## 2023-06-02 PROCEDURE — 3075F SYST BP GE 130 - 139MM HG: CPT | Performed by: INTERNAL MEDICINE

## 2023-06-02 PROCEDURE — 99213 OFFICE O/P EST LOW 20 MIN: CPT | Performed by: INTERNAL MEDICINE

## 2023-06-02 PROCEDURE — 99212 OFFICE O/P EST SF 10 MIN: CPT | Performed by: INTERNAL MEDICINE

## 2023-06-02 RX ORDER — FINERENONE 10 MG/1
10 TABLET, FILM COATED ORAL DAILY
Qty: 30 TABLET | Refills: 11 | Status: SHIPPED | OUTPATIENT
Start: 2023-06-02 | End: 2023-07-07

## 2023-06-02 RX ORDER — ATORVASTATIN CALCIUM 80 MG/1
80 TABLET, FILM COATED ORAL EVERY EVENING
Qty: 100 TABLET | Refills: 4 | Status: SHIPPED | OUTPATIENT
Start: 2023-06-02 | End: 2023-06-02 | Stop reason: SDUPTHER

## 2023-06-02 RX ORDER — FINERENONE 10 MG/1
10 TABLET, FILM COATED ORAL DAILY
Qty: 30 TABLET | Refills: 11 | Status: SHIPPED | OUTPATIENT
Start: 2023-06-02 | End: 2023-06-02 | Stop reason: SDUPTHER

## 2023-06-02 RX ORDER — ATORVASTATIN CALCIUM 80 MG/1
80 TABLET, FILM COATED ORAL EVERY EVENING
Qty: 100 TABLET | Refills: 4 | Status: ON HOLD | OUTPATIENT
Start: 2023-06-02 | End: 2024-02-21

## 2023-06-02 ASSESSMENT — ENCOUNTER SYMPTOMS
NAUSEA: 0
SPEECH CHANGE: 0
EYE DISCHARGE: 0
DOUBLE VISION: 0
VOMITING: 0
MYALGIAS: 0
PND: 0
SENSORY CHANGE: 0
FEVER: 0
BRUISES/BLEEDS EASILY: 0
ORTHOPNEA: 0
EYE PAIN: 0
BLOOD IN STOOL: 0
DIZZINESS: 0
WEIGHT LOSS: 0
BLURRED VISION: 0
DEPRESSION: 0
SHORTNESS OF BREATH: 0
FALLS: 0
COUGH: 0
CLAUDICATION: 0
ABDOMINAL PAIN: 0
HALLUCINATIONS: 0
CHILLS: 0
PALPITATIONS: 0
LOSS OF CONSCIOUSNESS: 0
HEADACHES: 0

## 2023-06-02 ASSESSMENT — FIBROSIS 4 INDEX: FIB4 SCORE: 1.17

## 2023-06-02 NOTE — PROGRESS NOTES
Chief Complaint   Patient presents with    MI (Non ST Segment Elevation MI)    Hypertension     F/V Dx: Primary hypertension    Coronary Artery Disease     F/V Dx: Coronary artery disease involving native coronary artery of native heart without angina pectoris       Subjective     Beni Moore is a 52 y.o. male who presents today for cardiac care and management of established coronary artery disease status post prior coronary stents, hypertension, hyperlipidemia, CKD with GFR of 33, microalbumin in the urine.    I personally interpreted the blood test result which showed GFR of 33, glycohemoglobin of 9.3, U ACR of 5745.  Potassium of 3.0.    I have independently interpreted and reviewed echocardiogram's actual images with patient which showed normal left ventricular systolic function. No wall motion abnormality. No evidence of pulmonary hypertension. No significant valvular disease.      Past Medical History:   Diagnosis Date    Anesthesia     Hypotension post-op, persisted for a few months    Bowel habit changes     History GI problems    Cataract     IOL - Left eye    Diabetes (HCC)     Oral medications & insulin    Heart burn     GERD; takes Prilosec    Hemorrhagic disorder (HCC)     ASA protection for stent and cardiac history    Hx of heart artery stent 2019    Hyperlipidemia     Hypertension      Past Surgical History:   Procedure Laterality Date    TOE AMPUTATION Left 9/30/2021    Procedure: AMPUTATION, TOE;  Surgeon: Tomer Hart M.D.;  Location: [facility];  Service: Orthopedics    STENT PLACEMENT  2019    STEMI with nonobstructive LAD    TOE AMPUTATION Right 7/2/2018    Procedure: Transmetatarsal amputation;  Surgeon: Ravi Bernardo M.D.;  Location: [facility];  Service: Orthopedics    ACHILLES TENDON REPAIR Right 7/2/2018    Procedure: ACHILLES LENGTHENING;  Surgeon: Ravi Bernardo M.D.;  Location: [facility];  Service: Orthopedics    IRRIGATION & DEBRIDEMENT  ORTHO Right 2018    Procedure: IRRIGATION & DEBRIDEMENT ORTHO;  Surgeon: Ravi Bernardo M.D.;  Location: [facility];  Service: Orthopedics    OTHER Left     IOL    OTHER ABDOMINAL SURGERY      OTHER ORTHOPEDIC SURGERY      TONSILLECTOMY      VASECTOMY       Family History   Problem Relation Age of Onset    No Known Problems Mother     Diabetes Father     Heart Disease Father     Diabetes Maternal Grandmother     Heart Disease Maternal Grandfather     Lung Disease Paternal Grandmother     Cancer Paternal Grandmother     Cancer Paternal Grandfather     Lung Cancer Paternal Grandfather     Kidney Disease Neg Hx      Social History     Socioeconomic History    Marital status:      Spouse name: Not on file    Number of children: Not on file    Years of education: Not on file    Highest education level: Some college, no degree   Occupational History    Not on file   Tobacco Use    Smoking status: Former     Packs/day: 0.50     Years: 10.00     Pack years: 5.00     Types: Cigarettes     Quit date:      Years since quittin.4    Smokeless tobacco: Never   Vaping Use    Vaping Use: Never used   Substance and Sexual Activity    Alcohol use: No    Drug use: Not Currently     Types: Marijuana, Oral     Comment: CBD Edibles 3-4 times per week    Sexual activity: Not Currently     Partners: Female   Other Topics Concern    Not on file   Social History Narrative    Not on file     Social Determinants of Health     Financial Resource Strain: Low Risk  (2022)    Overall Financial Resource Strain (CARDIA)     Difficulty of Paying Living Expenses: Not hard at all   Food Insecurity: No Food Insecurity (2022)    Hunger Vital Sign     Worried About Running Out of Food in the Last Year: Never true     Ran Out of Food in the Last Year: Never true   Transportation Needs: No Transportation Needs (2022)    PRAPARE - Transportation     Lack of Transportation (Medical): No     Lack of  Transportation (Non-Medical): No   Physical Activity: Insufficiently Active (12/13/2022)    Exercise Vital Sign     Days of Exercise per Week: 2 days     Minutes of Exercise per Session: 40 min   Stress: No Stress Concern Present (12/13/2022)    [employer] [place] [employer] - Occupational Stress Questionnaire     Feeling of Stress : Not at all   Social Connections: Moderately Integrated (12/13/2022)    Social Connection and Isolation Panel [NHANES]     Frequency of Communication with Friends and Family: More than three times a week     Frequency of Social Gatherings with Friends and Family: More than three times a week     Attends Jew Services: Never     Active Member of Clubs or Organizations: Yes     Attends Club or Organization Meetings: Never     Marital Status:    Intimate Partner Violence: Not on file   Housing Stability: Low Risk  (12/13/2022)    Housing Stability Vital Sign     Unable to Pay for Housing in the Last Year: No     Number of Places Lived in the Last Year: 1     Unstable Housing in the Last Year: No     No Known Allergies  Outpatient Encounter Medications as of 6/2/2023   Medication Sig Dispense Refill    Finerenone (KERENDIA) 10 MG Tab Take 10 mg by mouth every day. 30 Tablet 11    atorvastatin (LIPITOR) 80 MG tablet Take 1 Tablet by mouth every evening. 100 Tablet 4    Dulaglutide (TRULICITY) 4.5 MG/0.5ML Solution Pen-injector Inject  under the skin. 1 injection per week      Insulin Degludec (TRESIBA FLEXTOUCH) 100 UNIT/ML Solution Pen-injector Inject  under the skin. 18 units at night      vitamin D2, Ergocalciferol, (DRISDOL) 1.25 MG (79558 UT) Cap capsule Take 1 Capsule by mouth every 7 days. 12 Capsule 0    aspirin EC (ECOTRIN) 81 MG Tablet Delayed Response Take 1 Tablet by mouth every day. 90 Tablet 2    furosemide (LASIX) 80 MG Tab Take 2 Tablets by mouth every day. 160 mg  Tablet 3    enalapril (VASOTEC) 20 MG tablet Take 0.5 Tablets by mouth 2 times a  day. 90 Tablet 3    carvedilol (COREG) 3.125 MG Tab Take 1 Tablet by mouth 2 times a day with meals. 180 Tablet 3    cholestyramine (QUESTRAN) 4 g packet       Empagliflozin (JARDIANCE) 25 MG Tab Take  by mouth.      omeprazole (PRILOSEC) 20 MG delayed-release capsule Take 1 Capsule by mouth every morning.      [DISCONTINUED] atorvastatin (LIPITOR) 80 MG tablet Take 80 mg by mouth every evening.       No facility-administered encounter medications on file as of 6/2/2023.     Review of Systems   Constitutional:  Negative for chills, fever, malaise/fatigue and weight loss.   HENT:  Negative for ear discharge, ear pain, hearing loss and nosebleeds.    Eyes:  Negative for blurred vision, double vision, pain and discharge.   Respiratory:  Negative for cough and shortness of breath.    Cardiovascular:  Positive for leg swelling. Negative for chest pain, palpitations, orthopnea, claudication and PND.   Gastrointestinal:  Negative for abdominal pain, blood in stool, melena, nausea and vomiting.   Genitourinary:  Negative for dysuria and hematuria.   Musculoskeletal:  Negative for falls, joint pain and myalgias.   Skin:  Negative for itching and rash.   Neurological:  Negative for dizziness, sensory change, speech change, loss of consciousness and headaches.   Endo/Heme/Allergies:  Negative for environmental allergies. Does not bruise/bleed easily.   Psychiatric/Behavioral:  Negative for depression, hallucinations and suicidal ideas.               Objective     /89 (BP Location: Left arm, Patient Position: Sitting, BP Cuff Size: Adult)   Pulse 80   Resp 12   Ht 1.829 m (6')   Wt 94.3 kg (208 lb)   SpO2 99%   BMI 28.21 kg/m²     Physical Exam  Vitals and nursing note reviewed.   Constitutional:       General: He is not in acute distress.     Appearance: He is not diaphoretic.   HENT:      Head: Normocephalic and atraumatic.      Right Ear: External ear normal.      Left Ear: External ear normal.      Nose: No  congestion or rhinorrhea.   Eyes:      General:         Right eye: No discharge.         Left eye: No discharge.   Neck:      Thyroid: No thyromegaly.      Vascular: No JVD.   Cardiovascular:      Rate and Rhythm: Normal rate and regular rhythm.      Pulses: Normal pulses.   Pulmonary:      Effort: No respiratory distress.   Abdominal:      General: There is no distension.      Tenderness: There is no abdominal tenderness.   Musculoskeletal:         General: Swelling present. No tenderness.      Right lower leg: Edema present.      Left lower leg: Edema present.   Skin:     General: Skin is warm and dry.   Neurological:      Mental Status: He is alert and oriented to person, place, and time.      Cranial Nerves: No cranial nerve deficit.   Psychiatric:         Behavior: Behavior normal.                Assessment & Plan     1. ACC/AHA stage C heart failure with preserved ejection fraction (HCC)        2. Left ventricular systolic dysfunction (LVSD), NYHA class 2        3. Coronary artery disease involving native coronary artery of native heart without angina pectoris        4. Primary hypertension        5. Stage 3a chronic kidney disease (HCC)  Finerenone (KERENDIA) 10 MG Tab      6. Type 2 diabetes mellitus with hyperglycemia, with long-term current use of insulin (HCC)  Finerenone (KERENDIA) 10 MG Tab      7. Hyperlipidemia, unspecified hyperlipidemia type  atorvastatin (LIPITOR) 80 MG tablet          Medical Decision Making: Today's Assessment/Status/Plan:   Today, based on physical examination findings, patient is euvolemic. No JVD, lungs are clear to auscultation, no pitting edema in bilateral lower extremities, no ascites.    Dry weight is 208 lbs.    Coronary arterial disease s/p RCA stent in 09/2019:  Betablocker as Carvedilol 3.125 mg po 2x daily at patient's request.  ASA 81 mg po daily.  Statin as Atorvastatin 80 mg po daily  ACE-I as Enalapril 20 mg po 2x daily.     Hypertension:  Optimize control with  BB, ACE-I or ARB as permitted above in conjunction with CAD treatment.     Hyperlipidemia:  Optimize statin as within guidelines of CAD treatment as above.    CKD with Type 2 DM:  Based on recent FDA approval of Kerendia (finerenone) therapy to reduce the risk of kidney function decline, kidney failure, cardiovascular death, non-fatal heart attacks, and hospitalization for heart failure in adults with chronic kidney disease associated with type 2 diabetes, I will start patient on Kerendia 10 mg daily. Risks and benefits of this therapy were discussed with patient, who has agreed to proceed. We will closely monitor based on protocol for the side effects of this high risk medication with basic metabolic panel in 3 weeks.      Overall, based on prior history of obstructive coronary arterial disease, patient is at at high risk for recurrent events and will require consistent ongoing guidelines directed medical therapy to reduce his cardiovascular mortality and associated complications.    I will see patient back in clinic with lab tests and studies results in 1 months.    I thank you for referring patient to our Cardiology Clinic today.      Maegan Ríos MD.   [facility].  [phone]  [place], [place].

## 2023-06-05 ENCOUNTER — TELEPHONE (OUTPATIENT)
Dept: CARDIOLOGY | Facility: MEDICAL CENTER | Age: 53
End: 2023-06-05
Payer: MEDICARE

## 2023-06-06 NOTE — TELEPHONE ENCOUNTER
PA received via fax, questions answered and faxed back with chart notes, confirmation received, and scanned into [employer].

## 2023-06-27 ENCOUNTER — TELEPHONE (OUTPATIENT)
Dept: CARDIOLOGY | Facility: MEDICAL CENTER | Age: 53
End: 2023-06-27
Payer: MEDICARE

## 2023-06-27 NOTE — TELEPHONE ENCOUNTER
LVM for pt in regards to lab work that was ordered at previous OV with . Office number given for call back if pt has any questions or has had lab work done outside of [facility]. Pt has follow up appointment scheduled with ELVIE Camara on 06/07/2023..

## 2023-07-03 ENCOUNTER — HOSPITAL ENCOUNTER (OUTPATIENT)
Dept: LAB | Facility: MEDICAL CENTER | Age: 53
End: 2023-07-03
Attending: INTERNAL MEDICINE
Payer: MEDICARE

## 2023-07-03 DIAGNOSIS — Z79.899 HIGH RISK MEDICATION USE: ICD-10-CM

## 2023-07-03 LAB
ANION GAP SERPL CALC-SCNC: 11 MMOL/L (ref 7–16)
BUN SERPL-MCNC: 36 MG/DL (ref 8–22)
CALCIUM SERPL-MCNC: 7.9 MG/DL (ref 8.5–10.5)
CHLORIDE SERPL-SCNC: 107 MMOL/L (ref 96–112)
CO2 SERPL-SCNC: 22 MMOL/L (ref 20–33)
CREAT SERPL-MCNC: 2.12 MG/DL (ref 0.5–1.4)
GFR SERPLBLD CREATININE-BSD FMLA CKD-EPI: 36 ML/MIN/1.73 M 2
GLUCOSE SERPL-MCNC: 199 MG/DL (ref 65–99)
POTASSIUM SERPL-SCNC: 3 MMOL/L (ref 3.6–5.5)
SODIUM SERPL-SCNC: 140 MMOL/L (ref 135–145)

## 2023-07-03 PROCEDURE — 36415 COLL VENOUS BLD VENIPUNCTURE: CPT

## 2023-07-03 PROCEDURE — 80048 BASIC METABOLIC PNL TOTAL CA: CPT

## 2023-07-07 ENCOUNTER — OFFICE VISIT (OUTPATIENT)
Dept: CARDIOLOGY | Facility: MEDICAL CENTER | Age: 53
End: 2023-07-07
Attending: NURSE PRACTITIONER
Payer: MEDICARE

## 2023-07-07 VITALS
HEART RATE: 77 BPM | RESPIRATION RATE: 15 BRPM | HEIGHT: 72 IN | DIASTOLIC BLOOD PRESSURE: 70 MMHG | BODY MASS INDEX: 26.95 KG/M2 | SYSTOLIC BLOOD PRESSURE: 114 MMHG | WEIGHT: 199 LBS | OXYGEN SATURATION: 95 %

## 2023-07-07 DIAGNOSIS — I25.10 CORONARY ARTERY DISEASE INVOLVING NATIVE CORONARY ARTERY OF NATIVE HEART WITHOUT ANGINA PECTORIS: ICD-10-CM

## 2023-07-07 DIAGNOSIS — E11.621 TYPE 2 DIABETES MELLITUS WITH FOOT ULCER (CODE) (HCC): ICD-10-CM

## 2023-07-07 DIAGNOSIS — E78.5 HYPERLIPIDEMIA, UNSPECIFIED HYPERLIPIDEMIA TYPE: ICD-10-CM

## 2023-07-07 DIAGNOSIS — Z79.899 HIGH RISK MEDICATION USE: ICD-10-CM

## 2023-07-07 DIAGNOSIS — E87.6 HYPOKALEMIA: ICD-10-CM

## 2023-07-07 DIAGNOSIS — I10 PRIMARY HYPERTENSION: ICD-10-CM

## 2023-07-07 DIAGNOSIS — N18.32 STAGE 3B CHRONIC KIDNEY DISEASE: ICD-10-CM

## 2023-07-07 PROBLEM — I50.9 ACUTE HEART FAILURE, UNSPECIFIED HEART FAILURE TYPE (HCC): Status: RESOLVED | Noted: 2022-11-17 | Resolved: 2023-07-07

## 2023-07-07 PROCEDURE — 99214 OFFICE O/P EST MOD 30 MIN: CPT | Performed by: NURSE PRACTITIONER

## 2023-07-07 PROCEDURE — 99213 OFFICE O/P EST LOW 20 MIN: CPT | Performed by: NURSE PRACTITIONER

## 2023-07-07 PROCEDURE — 3074F SYST BP LT 130 MM HG: CPT | Performed by: NURSE PRACTITIONER

## 2023-07-07 PROCEDURE — 99212 OFFICE O/P EST SF 10 MIN: CPT | Performed by: NURSE PRACTITIONER

## 2023-07-07 PROCEDURE — 3078F DIAST BP <80 MM HG: CPT | Performed by: NURSE PRACTITIONER

## 2023-07-07 RX ORDER — POTASSIUM CHLORIDE 20 MEQ/1
20 TABLET, EXTENDED RELEASE ORAL DAILY
Qty: 90 TABLET | Refills: 1 | Status: SHIPPED | OUTPATIENT
Start: 2023-07-07 | End: 2023-12-06

## 2023-07-07 ASSESSMENT — ENCOUNTER SYMPTOMS
CLAUDICATION: 0
DIZZINESS: 0
PALPITATIONS: 0
COUGH: 0
SHORTNESS OF BREATH: 0
ABDOMINAL PAIN: 0
ORTHOPNEA: 0
PND: 0
FEVER: 0
MYALGIAS: 0

## 2023-07-07 ASSESSMENT — FIBROSIS 4 INDEX: FIB4 SCORE: 1.17

## 2023-07-07 NOTE — PROGRESS NOTES
Chief Complaint   Patient presents with    Congestive Heart Failure     F/V DX: ACC/AHA stage C heart failure with preserved ejection fraction (HCC)       Subjective     Beni Moore is a 52 y.o. male who presents today for follow-up on his CAD, Hypertension, hyperlipidemia.    Patient of Dr. Ríos.  He was last seen in clinic on 6/2/2023.  During that visit, patient was started on finerenone 10 mg daily, and sent for follow-up lab testing, but patient is not able to afford medication.    Patient feels well, denies chest pain, shortness of breath, palpitations, dizziness/lightheadedness, orthopnea, PND or Edema.  He reports he is feeling the best he has in a long time.    Patient reports he is active, he is walking about a mile and a half daily.  He states he his trying to get ready for hunting season.  He will be going on a deer hunt in October and an elk hunt in November.    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 Ortiz    -Type 2 diabetes    -Hyperparathyroidism due to CKD    Past Medical History:   Diagnosis Date    Anesthesia     Hypotension post-op, persisted for a few months    Bowel habit changes     History GI problems    Cataract     IOL - Left eye    Diabetes (HCC)     Oral medications & insulin    Heart burn     GERD; takes Prilosec    Hemorrhagic disorder (HCC)     ASA protection for stent and cardiac history    Hx of heart artery stent 2019    Hyperlipidemia     Hypertension      Past Surgical History:   Procedure Laterality Date    TOE AMPUTATION Left 9/30/2021    Procedure: AMPUTATION, TOE;  Surgeon: Tomer Hart M.D.;  Location: SURGERY [facility];  Service: Orthopedics    STENT PLACEMENT  2019    STEMI with nonobstructive LAD    TOE AMPUTATION Right 7/2/2018    Procedure: Transmetatarsal amputation;  Surgeon: Ravi Bernardo  M.D.;  Location: SURGERY [facility];  Service: Orthopedics    ACHILLES TENDON REPAIR Right 2018    Procedure: ACHILLES LENGTHENING;  Surgeon: Ravi Bernardo M.D.;  Location: SURGERY [facility];  Service: Orthopedics    IRRIGATION & DEBRIDEMENT ORTHO Right 2018    Procedure: IRRIGATION & DEBRIDEMENT ORTHO;  Surgeon: Ravi Bernardo M.D.;  Location: SURGERY [facility];  Service: Orthopedics    OTHER Left     IOL    OTHER ABDOMINAL SURGERY      OTHER ORTHOPEDIC SURGERY      TONSILLECTOMY      VASECTOMY       Family History   Problem Relation Age of Onset    No Known Problems Mother     Diabetes Father     Heart Disease Father     Diabetes Maternal Grandmother     Heart Disease Maternal Grandfather     Lung Disease Paternal Grandmother     Cancer Paternal Grandmother     Cancer Paternal Grandfather     Lung Cancer Paternal Grandfather     Kidney Disease Neg Hx      Social History     Socioeconomic History    Marital status:      Spouse name: Not on file    Number of children: Not on file    Years of education: Not on file    Highest education level: Some college, no degree   Occupational History    Not on file   Tobacco Use    Smoking status: Former     Packs/day: 0.50     Years: 10.00     Pack years: 5.00     Types: Cigarettes     Quit date:      Years since quittin.5    Smokeless tobacco: Never   Vaping Use    Vaping Use: Never used   Substance and Sexual Activity    Alcohol use: No    Drug use: Not Currently     Types: Marijuana, Oral     Comment: CBD Edibles 3-4 times per week    Sexual activity: Not Currently     Partners: Female   Other Topics Concern    Not on file   Social History Narrative    Not on file     Social Determinants of Health     Financial Resource Strain: Low Risk  (2022)    Overall Financial Resource Strain (CARDIA)     Difficulty of Paying Living Expenses: Not hard at all   Food Insecurity: No Food Insecurity (2022)    Hunger Vital Sign      Worried About Running Out of Food in the Last Year: Never true     Ran Out of Food in the Last Year: Never true   Transportation Needs: No Transportation Needs (12/13/2022)    PRAPARE - Transportation     Lack of Transportation (Medical): No     Lack of Transportation (Non-Medical): No   Physical Activity: Insufficiently Active (12/13/2022)    Exercise Vital Sign     Days of Exercise per Week: 2 days     Minutes of Exercise per Session: 40 min   Stress: No Stress Concern Present (12/13/2022)    [employer] - Occupational Stress Questionnaire     Feeling of Stress : Not at all   Social Connections: Moderately Integrated (12/13/2022)    Social Connection and Isolation Panel [NHANES]     Frequency of Communication with Friends and Family: More than three times a week     Frequency of Social Gatherings with Friends and Family: More than three times a week     Attends Anabaptism Services: Never     Active Member of Clubs or Organizations: Yes     Attends Club or Organization Meetings: Never     Marital Status:    Intimate Partner Violence: Not on file   Housing Stability: Low Risk  (12/13/2022)    Housing Stability Vital Sign     Unable to Pay for Housing in the Last Year: No     Number of Places Lived in the Last Year: 1     Unstable Housing in the Last Year: No     No Known Allergies  Outpatient Encounter Medications as of 7/7/2023   Medication Sig Dispense Refill    potassium chloride SA (KDUR) 20 MEQ Tab CR Take 1 Tablet by mouth every day. 90 Tablet 1    atorvastatin (LIPITOR) 80 MG tablet Take 1 Tablet by mouth every evening. 100 Tablet 4    Dulaglutide (TRULICITY) 4.5 MG/0.5ML Solution Pen-injector Inject  under the skin. 1 injection per week      Insulin Degludec (TRESIBA FLEXTOUCH) 100 UNIT/ML Solution Pen-injector Inject  under the skin. 18 units at night      vitamin D2, Ergocalciferol, (DRISDOL) 1.25 MG (53164 UT) Cap capsule Take 1 Capsule by mouth every 7 days. 12 Capsule  0    aspirin EC (ECOTRIN) 81 MG Tablet Delayed Response Take 1 Tablet by mouth every day. 90 Tablet 2    furosemide (LASIX) 80 MG Tab Take 2 Tablets by mouth every day. 160 mg  Tablet 3    enalapril (VASOTEC) 20 MG tablet Take 0.5 Tablets by mouth 2 times a day. 90 Tablet 3    carvedilol (COREG) 3.125 MG Tab Take 1 Tablet by mouth 2 times a day with meals. 180 Tablet 3    cholestyramine (QUESTRAN) 4 g packet       Empagliflozin (JARDIANCE) 25 MG Tab Take  by mouth.      omeprazole (PRILOSEC) 20 MG delayed-release capsule Take 1 Capsule by mouth every morning.      [DISCONTINUED] Finerenone (KERENDIA) 10 MG Tab Take 10 mg by mouth every day. 30 Tablet 11     No facility-administered encounter medications on file as of 7/7/2023.     Review of Systems   Constitutional:  Negative for fever and malaise/fatigue.   Respiratory:  Negative for cough and shortness of breath.    Cardiovascular:  Negative for chest pain, palpitations, orthopnea, claudication, leg swelling and PND.   Gastrointestinal:  Negative for abdominal pain.   Musculoskeletal:  Negative for myalgias.   Neurological:  Negative for dizziness.   All other systems reviewed and are negative.             Objective     /70 (BP Location: Left arm, Patient Position: Sitting, BP Cuff Size: Adult)   Pulse 77   Resp 15   Ht 1.829 m (6')   Wt 90.3 kg (199 lb)   SpO2 95%   BMI 26.99 kg/m²     Physical Exam  Vitals reviewed.   Constitutional:       Appearance: He is well-developed.   HENT:      Head: Normocephalic and atraumatic.   Eyes:      Pupils: Pupils are equal, round, and reactive to light.   Neck:      Vascular: No JVD.   Cardiovascular:      Rate and Rhythm: Normal rate and regular rhythm.      Heart sounds: Normal heart sounds.   Pulmonary:      Effort: Pulmonary effort is normal. No respiratory distress.      Breath sounds: Normal breath sounds. No wheezing or rales.   Abdominal:      General: Bowel sounds are normal.      Palpations: Abdomen  is soft.   Musculoskeletal:         General: Normal range of motion.      Cervical back: Normal range of motion and neck supple.      Right lower leg: No edema.      Left lower leg: No edema.      Right Lower Extremity: (right foot toe amputations)     Left Lower Extremity: (Left toe amputation)  Skin:     General: Skin is warm and dry.   Neurological:      General: No focal deficit present.      Mental Status: He is alert and oriented to person, place, and time.   Psychiatric:         Behavior: Behavior normal.       Lab Results   Component Value Date/Time    CHOLSTRLTOT 79 (L) 06/02/2021 06:13 AM    LDL 26 06/02/2021 06:13 AM    HDL 35 (A) 06/02/2021 06:13 AM    TRIGLYCERIDE 88 06/02/2021 06:13 AM       Lab Results   Component Value Date/Time    SODIUM 140 07/03/2023 03:19 PM    POTASSIUM 3.0 (L) 07/03/2023 03:19 PM    CHLORIDE 107 07/03/2023 03:19 PM    CO2 22 07/03/2023 03:19 PM    GLUCOSE 199 (H) 07/03/2023 03:19 PM    BUN 36 (H) 07/03/2023 03:19 PM    CREATININE 2.12 (H) 07/03/2023 03:19 PM     Lab Results   Component Value Date/Time    ALKPHOSPHAT 81 12/19/2022 10:52 AM    ASTSGOT 30 12/19/2022 10:52 AM    ALTSGPT 25 12/19/2022 10:52 AM    TBILIRUBIN 0.8 12/19/2022 10:52 AM        Myocardial Perfusion Report 9/21/2019   NUCLEAR IMAGING INTERPRETATION   Normal left ventricular size, ejection fraction, and wall motion.   No reversible defects that would indicate ischemia.    Non-reversible defect noted could be due to artifact or infarct.     Coronary angiogram 09/22/2019   REFERRING PHYSICIAN:  Rl Engel MD     PROCEDURES:  1.  Left heart catheterization.  2.  Coronary angiography.  3.  PTCA/stent placement of the distal right coronary artery.  4.  Left ventriculogram.  5.  Monitor of conscious sedation.     PREPROCEDURE DIAGNOSES:  1.  Chest pain.  2.  Abnormal myocardial perfusion study.     POSTPROCEDURE DIAGNOSES:  1.  Coronary artery disease with high-grade distal right coronary artery    stenosis, nonobstructive proximal left anterior descending artery and mid to   distal left anterior descending artery stenosis.  2.  Successful percutaneous transluminal coronary angioplasty/stent placement   of the distal right coronary artery with 2.5x20 mm Synergy drug-eluting stent.  3.  Normal left ventricular systolic function with ejection fraction of 65%.  4.  Elevated left ventricular end-diastolic pressure.    Transthoracic Echo Report 9/24/2019  No prior study is available for comparison.   Technically difficult but otherwise normal study.     Transthoracic Echo Report 8/20/2021  Limited echo per Covid19 protocol.  Prior echo on 9/24/19. Compared to the report of the study done - there has been no significant change.   Normal left ventricular systolic function.  Left ventricular ejection fraction is visually estimated to be 65%.  No significant valve abnormalities.      Transthoracic Echo Report 11/18/2022  The left ventricular ejection fraction is visually estimated to be 60%.  Trace tricuspid regurgitation.  Unable to estimate right ventricular systolic pressure due to an inadequate tricuspid regurgitant jet.    Assessment & Plan     1. Hypokalemia  potassium chloride SA (KDUR) 20 MEQ Tab CR      2. Coronary artery disease involving native coronary artery of native heart without angina pectoris        3. Hyperlipidemia, unspecified hyperlipidemia type        4. High risk medication use        5. Primary hypertension        6. Stage 3b chronic kidney disease (HCC)        7. Type 2 diabetes mellitus with foot ulcer (CODE) (Formerly [facility])            Medical Decision Making: Today's Assessment/Status/Plan:        CAD, s/p ARTURO to the dRCA on 9/22/2019:  -Last LDL 26 on 6/2/2021, LDL goal < 70  -Patient will need updated lipid panel, but has 1 ordered by PCP, patient to complete  -Continue atorvastatin 80 mg daily  -Continue aspirin 81 mg daily  -Continue carvedilol 3.125 mg twice a day  -Continue Jardiance 25 mg  daily    Hypertension: Stable  -Continue enalapril 10 mg twice a day  -Continue carvedilol 3.125 mg twice a day    Hyperlipidemia:  -Continue atorvastatin 80 mg daily    Hypokalemia:  -Potassium level on recent testing 3.0  -Patient to start potassium 20 mEq daily  -He has labs due next month for nephrology    CKD with diabetes:  -He sees nephrology and diabetes is managed by PCP  -Patient not able to afford finerenone  -Continue current regimen    FU in clinic in 2 months with labs. Sooner if needed.    Patient verbalizes understanding and agrees with the plan of care.     PLEASE NOTE: This Note was created using voice recognition Software. I have made every reasonable attempt to correct obvious errors, but I expect that there are errors of grammar and possibly content that I did not discover before finalizing the note

## 2023-10-25 ENCOUNTER — TELEPHONE (OUTPATIENT)
Dept: HEALTH INFORMATION MANAGEMENT | Facility: OTHER | Age: 53
End: 2023-10-25

## 2023-12-04 ENCOUNTER — HOSPITAL ENCOUNTER (EMERGENCY)
Facility: MEDICAL CENTER | Age: 53
End: 2023-12-04
Attending: EMERGENCY MEDICINE
Payer: MEDICARE

## 2023-12-04 ENCOUNTER — APPOINTMENT (OUTPATIENT)
Dept: RADIOLOGY | Facility: MEDICAL CENTER | Age: 53
End: 2023-12-04
Attending: EMERGENCY MEDICINE
Payer: MEDICARE

## 2023-12-04 VITALS
TEMPERATURE: 97.4 F | DIASTOLIC BLOOD PRESSURE: 91 MMHG | RESPIRATION RATE: 18 BRPM | HEART RATE: 68 BPM | HEIGHT: 72 IN | WEIGHT: 195 LBS | OXYGEN SATURATION: 95 % | BODY MASS INDEX: 26.41 KG/M2 | SYSTOLIC BLOOD PRESSURE: 171 MMHG

## 2023-12-04 DIAGNOSIS — S42.352A CLOSED COMMINUTED FRACTURE OF LEFT HUMERUS, INITIAL ENCOUNTER: ICD-10-CM
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
## 2023-12-04 PROCEDURE — 96376 TX/PRO/DX INJ SAME DRUG ADON: CPT

## 2023-12-04 PROCEDURE — 99284 EMERGENCY DEPT VISIT MOD MDM: CPT

## 2023-12-04 PROCEDURE — 73030 X-RAY EXAM OF SHOULDER: CPT | Mod: LT

## 2023-12-04 PROCEDURE — 96374 THER/PROPH/DIAG INJ IV PUSH: CPT

## 2023-12-04 PROCEDURE — 700111 HCHG RX REV CODE 636 W/ 250 OVERRIDE (IP): Mod: JZ | Performed by: EMERGENCY MEDICINE

## 2023-12-04 PROCEDURE — 96375 TX/PRO/DX INJ NEW DRUG ADDON: CPT

## 2023-12-04 RX ORDER — OXYCODONE HYDROCHLORIDE AND ACETAMINOPHEN 5; 325 MG/1; MG/1
1 TABLET ORAL EVERY 4 HOURS PRN
Qty: 15 TABLET | Refills: 0 | Status: SHIPPED | OUTPATIENT
Start: 2023-12-04 | End: 2023-12-09

## 2023-12-04 RX ORDER — ONDANSETRON 2 MG/ML
4 INJECTION INTRAMUSCULAR; INTRAVENOUS ONCE
Status: COMPLETED | OUTPATIENT
Start: 2023-12-04 | End: 2023-12-04

## 2023-12-04 RX ORDER — OXYCODONE HYDROCHLORIDE AND ACETAMINOPHEN 5; 325 MG/1; MG/1
1 TABLET ORAL EVERY 4 HOURS PRN
Qty: 15 TABLET | Refills: 0 | Status: SHIPPED | OUTPATIENT
Start: 2023-12-04 | End: 2023-12-04 | Stop reason: SDUPTHER

## 2023-12-04 RX ORDER — HYDROMORPHONE HYDROCHLORIDE 1 MG/ML
1 INJECTION, SOLUTION INTRAMUSCULAR; INTRAVENOUS; SUBCUTANEOUS ONCE
Status: COMPLETED | OUTPATIENT
Start: 2023-12-04 | End: 2023-12-04

## 2023-12-04 RX ADMIN — HYDROMORPHONE HYDROCHLORIDE 1 MG: 1 INJECTION, SOLUTION INTRAMUSCULAR; INTRAVENOUS; SUBCUTANEOUS at 15:50

## 2023-12-04 RX ADMIN — ONDANSETRON 4 MG: 2 INJECTION INTRAMUSCULAR; INTRAVENOUS at 15:50

## 2023-12-04 RX ADMIN — HYDROMORPHONE HYDROCHLORIDE 1 MG: 1 INJECTION, SOLUTION INTRAMUSCULAR; INTRAVENOUS; SUBCUTANEOUS at 16:59

## 2023-12-04 ASSESSMENT — FIBROSIS 4 INDEX: FIB4 SCORE: 1.195652173913043478

## 2023-12-04 ASSESSMENT — PAIN DESCRIPTION - PAIN TYPE
TYPE: ACUTE PAIN
TYPE: ACUTE PAIN

## 2023-12-04 NOTE — ED TRIAGE NOTES
Chief Complaint   Patient presents with    Shoulder Injury     BIB EMS, pt has obvious L shoulder deformity w/ crepitus. Pt was climbing ladder and fell from second step and shoulder landed on a large rock, pt reports 5/10 pain after receiving 200mg fent and EMS sling in place for comfort. -headstrike -loc     Connected to monitoring equipment, PIV established PTA by EMS. Chart up for EPR.

## 2023-12-04 NOTE — ED PROVIDER NOTES
ER Provider Note    Scribed for Jonnie Haskins D.O. by Wendy Rich. 12/4/2023  3:32 PM    Primary Care Provider: Florinda Pascual M.D.    CHIEF COMPLAINT  Chief Complaint   Patient presents with    Shoulder Injury     BIB EMS, pt has obvious L shoulder deformity w/ crepitus. Pt was climbing ladder and fell from second step and shoulder landed on a large rock, pt reports 5/10 pain after receiving 200mg fent and EMS sling in place for comfort. -headstrike -loc     HPI/ROS  Beni Moore is a 53 y.o. male who presents to the Emergency Department for a shoulder injury onset prior to arrival. Patient states he was stepping off the second step of a ladder and landed on a large rock. Patient has an obvious left shoulder deformity with crepitus. He reports his pain is currently at 5/10 after 200 mcg of Fentanyl given by EMS en route. Patient states his pain radiates towards the bottom of his arm with associated numbness. Denies neck pain. Patient denies head strike or loss of consciousness. Patient has a history of diabetes, hyperlipidemia and hypertension. No known drug allergies.      ROS as per HPI.    PAST MEDICAL HISTORY  Past Medical History:   Diagnosis Date    Anesthesia     Hypotension post-op, persisted for a few months    Bowel habit changes     History GI problems    Cataract     IOL - Left eye    Diabetes (HCC)     Oral medications & insulin    Heart burn     GERD; takes Prilosec    Hemorrhagic disorder (HCC)     ASA protection for stent and cardiac history    Hx of heart artery stent 2019    Hyperlipidemia     Hypertension      SURGICAL HISTORY  Past Surgical History:   Procedure Laterality Date    TOE AMPUTATION Left 9/30/2021    Procedure: AMPUTATION, TOE;  Surgeon: Tomer Hart M.D.;  Location: SURGERY [facility];  Service: Orthopedics    STENT PLACEMENT  2019    STEMI with nonobstructive LAD    TOE AMPUTATION Right 7/2/2018    Procedure: Transmetatarsal amputation;  Surgeon: Ravi Bernardo M.D.;   Location: SURGERY [facility];  Service: Orthopedics    ACHILLES TENDON REPAIR Right 7/2/2018    Procedure: ACHILLES LENGTHENING;  Surgeon: Ravi Bernardo M.D.;  Location: SURGERY [facility];  Service: Orthopedics    IRRIGATION & DEBRIDEMENT ORTHO Right 7/2/2018    Procedure: IRRIGATION & DEBRIDEMENT ORTHO;  Surgeon: Ravi Bernardo M.D.;  Location: SURGERY [facility];  Service: Orthopedics    OTHER Left     IOL    OTHER ABDOMINAL SURGERY      OTHER ORTHOPEDIC SURGERY      TONSILLECTOMY      VASECTOMY       FAMILY HISTORY  Family History   Problem Relation Age of Onset    No Known Problems Mother     Diabetes Father     Heart Disease Father     Diabetes Maternal Grandmother     Heart Disease Maternal Grandfather     Lung Disease Paternal Grandmother     Cancer Paternal Grandmother     Cancer Paternal Grandfather     Lung Cancer Paternal Grandfather     Kidney Disease Neg Hx      SOCIAL HISTORY   reports that he quit smoking about 21 years ago. His smoking use included cigarettes. He started smoking about 31 years ago. He has a 5.0 pack-year smoking history. He has never used smokeless tobacco. He reports that he does not currently use drugs after having used the following drugs: Marijuana and Oral. He reports that he does not drink alcohol.    CURRENT MEDICATIONS  Previous Medications    ASPIRIN EC (ECOTRIN) 81 MG TABLET DELAYED RESPONSE    Take 1 Tablet by mouth every day.    ATORVASTATIN (LIPITOR) 80 MG TABLET    Take 1 Tablet by mouth every evening.    CARVEDILOL (COREG) 3.125 MG TAB    Take 1 Tablet by mouth 2 times a day with meals.    CHOLESTYRAMINE (QUESTRAN) 4 G PACKET        DULAGLUTIDE (TRULICITY) 4.5 MG/0.5ML SOLUTION PEN-INJECTOR    Inject  under the skin. 1 injection per week    EMPAGLIFLOZIN (JARDIANCE) 25 MG TAB    Take  by mouth.    ENALAPRIL (VASOTEC) 20 MG TABLET    Take 0.5 Tablets by mouth 2 times a day.    FUROSEMIDE (LASIX) 80 MG TAB    Take 2 Tablets by mouth every day. 160 mg  PM    INSULIN DEGLUDEC (TRESIBA FLEXTOUCH) 100 UNIT/ML SOLUTION PEN-INJECTOR    Inject  under the skin. 18 units at night    OMEPRAZOLE (PRILOSEC) 20 MG DELAYED-RELEASE CAPSULE    Take 1 Capsule by mouth every morning.    POTASSIUM CHLORIDE SA (KDUR) 20 MEQ TAB CR    Take 1 Tablet by mouth every day.    VITAMIN D2, ERGOCALCIFEROL, (DRISDOL) 1.25 MG (36731 UT) CAP CAPSULE    Take 1 Capsule by mouth every 7 days.     ALLERGIES  Patient has no known allergies.    PHYSICAL EXAM  BP (!) 165/100   Pulse 64   Temp 36.6 °C (97.8 °F) (Temporal)   Resp 17   Ht 1.829 m (6')   Wt 88.5 kg (195 lb)   SpO2 97%   BMI 26.45 kg/m²     General: No acute distress. Laying supine with sling in left  HENT: Normocephalic, Mucus membranes are moist. No tenderness head  Neck: No tenderness   Chest: Lungs have even and unlabored respirations, Clear to auscultation. No tenderness   Cardiovascular: Regular rate and regular rhythm, No peripheral cyanosis.  Abdomen: Non distended.  Extremities:Left shoulder has anterior fullness. Diffuse tenderness. Distal neurovascular is normal.   Neuro: Awake, Conversive, Able to relay recent events.  Psychiatric: Calm and cooperative.     INITIAL ASSESSMENT  Patient had a ground level fall has an isolated injury of left shoulder dislocation vs contusion vs fracture. Will treat for pain and evaluate with xray.     ED Observation Status? No; Patient does not meet criteria for ED Observation.     DIAGNOSTIC STUDIES  Radiology:   The attending emergency physician has independently interpreted the diagnostic imaging associated with this visit and am waiting the final reading from the radiologist.   Preliminary interpretation is as follows: Fracture of proximal left humerus  Radiologist interpretation:   DX-SHOULDER 2+ LEFT   Final Result      1.  Fracture of the proximal humerus as described above   2.  Osteopenia        COURSE & MEDICAL DECISION MAKING     COURSE AND PLAN  3:34 PM - Patient was seen and  evaluated at bedside. Patient presents to the ED via EMS for a left shoulder injury onset prior to arrival. On exam patient's left shoulder has anterior fullness. Diffuse tenderness. Distal neurovascular is normal. After my exam, I discussed with the patient the plan of care, which includes treating the patient with medication for their symptoms, as well as obtaining imaging for further evaluation. Patient understands and verbalizes agreement to plan of care. Patient will be treated with Dilaudid 1 mg and Zofran 4 mg. Ordered DX shoulder left to evaluate.     4:16 PM - Patient was reevaluated at bedside. His pain has significantly improved with IV narcotics. Discussed imaging shows a fracture of proximal humerus. Plan to follow up with outpatient Orthopedic office. Discussed with patient treating pain with outpatient narcotics. Advised no drinking alcohol, driving, or operating heavy equipment while taking. Patient was advised of the sedation effect of these medications. I then informed the patient of my plan for discharge, which includes strict return precautions for any new or worsening symptoms. Patient understands and verbalizes agreement to plan of care. Patient is comfortable going home at this time.     ED Summary: Patient had a fall from the second step of a ladder.  There is no other injuries only left shoulder.  X-ray shows fracture.  Is medicated for pain with good results and stable for discharge home with orthopedic follow-up    HTN/IDDM FOLLOW UP:  The patient has known hypertension and is being followed by their primary care doctor    DISPOSITION AND DISCUSSIONS  Patient will be discharged home in stable condition.    In prescribing controlled substances to this patient, I certify that I have obtained and reviewed the medical history of Beni Moore. I have also made a good suraj effort to obtain applicable records from other providers who have treated the patient and records did not demonstrate  any increased risk of substance abuse that would prevent me from prescribing controlled substances.     I have conducted a physical exam and documented it. I have reviewed Mr. Moore’s prescription history as maintained by the [place] Prescription Monitoring Program.     I have assessed the patient’s risk for abuse, dependency, and addiction using the validated Opioid Risk Tool available at https://www.mdcalc.com/sxlssq-wnna-prna-ort-narcotic-abuse.     Given the above, I believe the benefits of controlled substance therapy outweigh the risks. The reasons for prescribing controlled substances include non-narcotic, oral analgesic alternatives have been inadequate for pain control. Accordingly, I have discussed the risk and benefits, treatment plan, and alternative therapies with the patient.     FOLLOW UP:  Bin Murillo M.D.  [address] [facility] [id number] [phone]    In 1 day        OUTPATIENT MEDICATIONS:  New Prescriptions    OXYCODONE-ACETAMINOPHEN (PERCOCET) 5-325 MG TAB    Take 1 Tablet by mouth every four hours as needed for Moderate Pain for up to 5 days.     FINAL DIAGNOSIS  1. Fall, initial encounter    2. Closed comminuted fracture of left humerus, initial encounter      IWendy (Elbaibe), am scribing for, and in the presence of, Jonnie Haskins D.O..    Electronically signed by: Wendy Rich (Elbaibcharo), 12/4/2023    IJonnie D.O. personally performed the services described in this documentation, as scribed by Wendy Rich in my presence, and it is both accurate and complete.     The note accurately reflects work and decisions made by me.  Jonnie Haskins D.O.  12/4/2023  5:24 PM

## 2023-12-05 DIAGNOSIS — Z79.4 TYPE 2 DIABETES MELLITUS WITH PROLIFERATIVE RETINOPATHY, WITH LONG-TERM CURRENT USE OF INSULIN, MACULAR EDEMA PRESENCE UNSPECIFIED, UNSPECIFIED LATERALITY, UNSPECIFIED PROLIFERATIVE RETINOPATHY* (HCC): Chronic | ICD-10-CM

## 2023-12-05 DIAGNOSIS — E11.3599 TYPE 2 DIABETES MELLITUS WITH PROLIFERATIVE RETINOPATHY, WITH LONG-TERM CURRENT USE OF INSULIN, MACULAR EDEMA PRESENCE UNSPECIFIED, UNSPECIFIED LATERALITY, UNSPECIFIED PROLIFERATIVE RETINOPATHY* (HCC): Chronic | ICD-10-CM

## 2023-12-05 DIAGNOSIS — N18.32 STAGE 3B CHRONIC KIDNEY DISEASE: ICD-10-CM

## 2023-12-05 DIAGNOSIS — I10 PRIMARY HYPERTENSION: Chronic | ICD-10-CM

## 2023-12-05 RX ORDER — FUROSEMIDE 80 MG
160 TABLET ORAL 2 TIMES DAILY
Qty: 360 TABLET | Refills: 3 | Status: SHIPPED | OUTPATIENT
Start: 2023-12-05 | End: 2023-12-06 | Stop reason: SDUPTHER

## 2023-12-05 NOTE — DISCHARGE INSTRUCTIONS
Use a sling to support this.  Use it at night if it feels better.    Use pain medications as prescribed.  Please call the number provided above for the orthopedist to get appropriate follow-up and treatment for this.  Return for any change or worsening symptoms

## 2023-12-05 NOTE — ED NOTES
Sling applied to pt per orders. Pt tolerated well with little pain. Pt and family member educated on use of sling and both verbalized understanding. Primary RN aware.

## 2023-12-05 NOTE — ED NOTES
Pt discharged to home. Pt was given follow up instructions and prescriptions for percocet. All lines removed prior to discharge. Pt verbalized understanding of all instructions for discharge and is ambulatory out of ED with steady gait. AOx4

## 2023-12-06 ENCOUNTER — APPOINTMENT (OUTPATIENT)
Dept: ADMISSIONS | Facility: MEDICAL CENTER | Age: 53
End: 2023-12-06
Attending: STUDENT IN AN ORGANIZED HEALTH CARE EDUCATION/TRAINING PROGRAM
Payer: MEDICARE

## 2023-12-06 DIAGNOSIS — Z79.4 TYPE 2 DIABETES MELLITUS WITH PROLIFERATIVE RETINOPATHY, WITH LONG-TERM CURRENT USE OF INSULIN, MACULAR EDEMA PRESENCE UNSPECIFIED, UNSPECIFIED LATERALITY, UNSPECIFIED PROLIFERATIVE RETINOPATHY* (HCC): Chronic | ICD-10-CM

## 2023-12-06 DIAGNOSIS — N18.32 STAGE 3B CHRONIC KIDNEY DISEASE: ICD-10-CM

## 2023-12-06 DIAGNOSIS — I10 PRIMARY HYPERTENSION: Chronic | ICD-10-CM

## 2023-12-06 DIAGNOSIS — E11.3599 TYPE 2 DIABETES MELLITUS WITH PROLIFERATIVE RETINOPATHY, WITH LONG-TERM CURRENT USE OF INSULIN, MACULAR EDEMA PRESENCE UNSPECIFIED, UNSPECIFIED LATERALITY, UNSPECIFIED PROLIFERATIVE RETINOPATHY* (HCC): Chronic | ICD-10-CM

## 2023-12-06 PROBLEM — S42.293A CLOSED 3-PART FRACTURE OF PROXIMAL HUMERUS: Status: ACTIVE | Noted: 2023-12-06

## 2023-12-06 RX ORDER — RANIBIZUMAB 6 MG/ML
1 INJECTION, SOLUTION INTRAVITREAL SEE ADMIN INSTRUCTIONS
Status: ON HOLD | COMMUNITY
End: 2024-02-21

## 2023-12-06 RX ORDER — FUROSEMIDE 80 MG
160 TABLET ORAL 2 TIMES DAILY
Qty: 360 TABLET | Refills: 3 | Status: SHIPPED | OUTPATIENT
Start: 2023-12-06 | End: 2023-12-07 | Stop reason: SDUPTHER

## 2023-12-07 ENCOUNTER — PRE-ADMISSION TESTING (OUTPATIENT)
Dept: ADMISSIONS | Facility: MEDICAL CENTER | Age: 53
End: 2023-12-07
Attending: STUDENT IN AN ORGANIZED HEALTH CARE EDUCATION/TRAINING PROGRAM
Payer: MEDICARE

## 2023-12-07 DIAGNOSIS — Z01.810 PRE-OPERATIVE CARDIOVASCULAR EXAMINATION: ICD-10-CM

## 2023-12-07 DIAGNOSIS — I10 PRIMARY HYPERTENSION: Chronic | ICD-10-CM

## 2023-12-07 DIAGNOSIS — Z79.4 TYPE 2 DIABETES MELLITUS WITH PROLIFERATIVE RETINOPATHY, WITH LONG-TERM CURRENT USE OF INSULIN, MACULAR EDEMA PRESENCE UNSPECIFIED, UNSPECIFIED LATERALITY, UNSPECIFIED PROLIFERATIVE RETINOPATHY* (HCC): Chronic | ICD-10-CM

## 2023-12-07 DIAGNOSIS — E11.3599 TYPE 2 DIABETES MELLITUS WITH PROLIFERATIVE RETINOPATHY, WITH LONG-TERM CURRENT USE OF INSULIN, MACULAR EDEMA PRESENCE UNSPECIFIED, UNSPECIFIED LATERALITY, UNSPECIFIED PROLIFERATIVE RETINOPATHY* (HCC): Chronic | ICD-10-CM

## 2023-12-07 DIAGNOSIS — N18.32 STAGE 3B CHRONIC KIDNEY DISEASE: ICD-10-CM

## 2023-12-07 DIAGNOSIS — Z01.812 PRE-OPERATIVE LABORATORY EXAMINATION: ICD-10-CM

## 2023-12-07 LAB
ANION GAP SERPL CALC-SCNC: 11 MMOL/L (ref 7–16)
BUN SERPL-MCNC: 76 MG/DL (ref 8–22)
CALCIUM SERPL-MCNC: 7.9 MG/DL (ref 8.5–10.5)
CHLORIDE SERPL-SCNC: 105 MMOL/L (ref 96–112)
CO2 SERPL-SCNC: 18 MMOL/L (ref 20–33)
CREAT SERPL-MCNC: 4.93 MG/DL (ref 0.5–1.4)
EKG IMPRESSION: NORMAL
ERYTHROCYTE [DISTWIDTH] IN BLOOD BY AUTOMATED COUNT: 40.8 FL (ref 35.9–50)
EST. AVERAGE GLUCOSE BLD GHB EST-MCNC: 171 MG/DL
GFR SERPLBLD CREATININE-BSD FMLA CKD-EPI: 13 ML/MIN/1.73 M 2
GLUCOSE SERPL-MCNC: 112 MG/DL (ref 65–99)
HBA1C MFR BLD: 7.6 % (ref 4–5.6)
HCT VFR BLD AUTO: 29.6 % (ref 42–52)
HGB BLD-MCNC: 10.1 G/DL (ref 14–18)
MCH RBC QN AUTO: 30 PG (ref 27–33)
MCHC RBC AUTO-ENTMCNC: 34.1 G/DL (ref 32.3–36.5)
MCV RBC AUTO: 87.8 FL (ref 81.4–97.8)
PLATELET # BLD AUTO: 233 K/UL (ref 164–446)
PMV BLD AUTO: 10.6 FL (ref 9–12.9)
POTASSIUM SERPL-SCNC: 3.5 MMOL/L (ref 3.6–5.5)
RBC # BLD AUTO: 3.37 M/UL (ref 4.7–6.1)
SODIUM SERPL-SCNC: 134 MMOL/L (ref 135–145)
WBC # BLD AUTO: 11 K/UL (ref 4.8–10.8)

## 2023-12-07 PROCEDURE — 80048 BASIC METABOLIC PNL TOTAL CA: CPT

## 2023-12-07 PROCEDURE — 36415 COLL VENOUS BLD VENIPUNCTURE: CPT

## 2023-12-07 PROCEDURE — 85027 COMPLETE CBC AUTOMATED: CPT

## 2023-12-07 PROCEDURE — 93005 ELECTROCARDIOGRAM TRACING: CPT

## 2023-12-07 PROCEDURE — 83036 HEMOGLOBIN GLYCOSYLATED A1C: CPT | Mod: GA

## 2023-12-07 PROCEDURE — 93010 ELECTROCARDIOGRAM REPORT: CPT | Performed by: INTERNAL MEDICINE

## 2023-12-07 RX ORDER — FUROSEMIDE 80 MG
160 TABLET ORAL 2 TIMES DAILY
Qty: 360 TABLET | Refills: 3 | Status: ON HOLD | OUTPATIENT
Start: 2023-12-07 | End: 2024-01-13

## 2023-12-07 NOTE — PREPROCEDURE INSTRUCTIONS
Patient has critically high creatinine results from today 12/7.  Faxed lab results to Dr Pierre as well as called and left a voicemail with Maritza with lab results and requested a call back.

## 2023-12-11 ENCOUNTER — HOSPITAL ENCOUNTER (OUTPATIENT)
Facility: MEDICAL CENTER | Age: 53
End: 2023-12-11
Attending: STUDENT IN AN ORGANIZED HEALTH CARE EDUCATION/TRAINING PROGRAM | Admitting: STUDENT IN AN ORGANIZED HEALTH CARE EDUCATION/TRAINING PROGRAM
Payer: MEDICARE

## 2023-12-11 ENCOUNTER — ANESTHESIA EVENT (OUTPATIENT)
Dept: SURGERY | Facility: MEDICAL CENTER | Age: 53
End: 2023-12-11
Payer: MEDICARE

## 2023-12-11 ENCOUNTER — APPOINTMENT (OUTPATIENT)
Dept: RADIOLOGY | Facility: MEDICAL CENTER | Age: 53
End: 2023-12-11
Attending: STUDENT IN AN ORGANIZED HEALTH CARE EDUCATION/TRAINING PROGRAM
Payer: MEDICARE

## 2023-12-11 ENCOUNTER — ANESTHESIA (OUTPATIENT)
Dept: SURGERY | Facility: MEDICAL CENTER | Age: 53
End: 2023-12-11
Payer: MEDICARE

## 2023-12-11 ENCOUNTER — PHARMACY VISIT (OUTPATIENT)
Dept: PHARMACY | Facility: MEDICAL CENTER | Age: 53
End: 2023-12-11
Payer: COMMERCIAL

## 2023-12-11 VITALS
HEART RATE: 77 BPM | OXYGEN SATURATION: 96 % | HEIGHT: 72 IN | TEMPERATURE: 96.6 F | SYSTOLIC BLOOD PRESSURE: 114 MMHG | BODY MASS INDEX: 26.43 KG/M2 | RESPIRATION RATE: 16 BRPM | DIASTOLIC BLOOD PRESSURE: 65 MMHG | WEIGHT: 195.11 LBS

## 2023-12-11 DIAGNOSIS — L08.9 DIABETIC FOOT INFECTION (HCC): ICD-10-CM

## 2023-12-11 DIAGNOSIS — E11.628 DIABETIC FOOT INFECTION (HCC): ICD-10-CM

## 2023-12-11 DIAGNOSIS — I25.10 CORONARY ARTERY DISEASE INVOLVING NATIVE CORONARY ARTERY OF NATIVE HEART WITHOUT ANGINA PECTORIS: ICD-10-CM

## 2023-12-11 DIAGNOSIS — I21.4 NSTEMI (NON-ST ELEVATED MYOCARDIAL INFARCTION) (HCC): ICD-10-CM

## 2023-12-11 DIAGNOSIS — S42.292A CLOSED 3-PART FRACTURE OF PROXIMAL END OF LEFT HUMERUS, INITIAL ENCOUNTER: ICD-10-CM

## 2023-12-11 DIAGNOSIS — D64.9 NORMOCYTIC ANEMIA: ICD-10-CM

## 2023-12-11 DIAGNOSIS — I25.2 HISTORY OF NON-ST ELEVATION MYOCARDIAL INFARCTION (NSTEMI): ICD-10-CM

## 2023-12-11 LAB
ANION GAP SERPL CALC-SCNC: 18 MMOL/L (ref 7–16)
BUN SERPL-MCNC: 100 MG/DL (ref 8–22)
CALCIUM SERPL-MCNC: 7.9 MG/DL (ref 8.5–10.5)
CHLORIDE SERPL-SCNC: 103 MMOL/L (ref 96–112)
CO2 SERPL-SCNC: 13 MMOL/L (ref 20–33)
CREAT SERPL-MCNC: 6.04 MG/DL (ref 0.5–1.4)
GFR SERPLBLD CREATININE-BSD FMLA CKD-EPI: 10 ML/MIN/1.73 M 2
GLUCOSE BLD STRIP.AUTO-MCNC: 93 MG/DL (ref 65–99)
GLUCOSE SERPL-MCNC: 95 MG/DL (ref 65–99)
POTASSIUM SERPL-SCNC: 3.8 MMOL/L (ref 3.6–5.5)
SODIUM SERPL-SCNC: 134 MMOL/L (ref 135–145)

## 2023-12-11 PROCEDURE — 160025 RECOVERY II MINUTES (STATS): Performed by: STUDENT IN AN ORGANIZED HEALTH CARE EDUCATION/TRAINING PROGRAM

## 2023-12-11 PROCEDURE — C1713 ANCHOR/SCREW BN/BN,TIS/BN: HCPCS | Performed by: STUDENT IN AN ORGANIZED HEALTH CARE EDUCATION/TRAINING PROGRAM

## 2023-12-11 PROCEDURE — 64415 NJX AA&/STRD BRCH PLXS IMG: CPT | Performed by: STUDENT IN AN ORGANIZED HEALTH CARE EDUCATION/TRAINING PROGRAM

## 2023-12-11 PROCEDURE — 160009 HCHG ANES TIME/MIN: Performed by: STUDENT IN AN ORGANIZED HEALTH CARE EDUCATION/TRAINING PROGRAM

## 2023-12-11 PROCEDURE — 160041 HCHG SURGERY MINUTES - EA ADDL 1 MIN LEVEL 4: Performed by: STUDENT IN AN ORGANIZED HEALTH CARE EDUCATION/TRAINING PROGRAM

## 2023-12-11 PROCEDURE — 23615 OPTX PROX HUMRL FX W/INT FIX: CPT | Mod: LT | Performed by: STUDENT IN AN ORGANIZED HEALTH CARE EDUCATION/TRAINING PROGRAM

## 2023-12-11 PROCEDURE — 700101 HCHG RX REV CODE 250: Performed by: ANESTHESIOLOGY

## 2023-12-11 PROCEDURE — 160029 HCHG SURGERY MINUTES - 1ST 30 MINS LEVEL 4: Performed by: STUDENT IN AN ORGANIZED HEALTH CARE EDUCATION/TRAINING PROGRAM

## 2023-12-11 PROCEDURE — 160048 HCHG OR STATISTICAL LEVEL 1-5: Performed by: STUDENT IN AN ORGANIZED HEALTH CARE EDUCATION/TRAINING PROGRAM

## 2023-12-11 PROCEDURE — 23405 INCISION OF TENDON & MUSCLE: CPT | Mod: LT | Performed by: STUDENT IN AN ORGANIZED HEALTH CARE EDUCATION/TRAINING PROGRAM

## 2023-12-11 PROCEDURE — 23615 OPTX PROX HUMRL FX W/INT FIX: CPT | Mod: 80ROC,LT | Performed by: STUDENT IN AN ORGANIZED HEALTH CARE EDUCATION/TRAINING PROGRAM

## 2023-12-11 PROCEDURE — 23405 INCISION OF TENDON & MUSCLE: CPT | Mod: 80ROC,LT | Performed by: STUDENT IN AN ORGANIZED HEALTH CARE EDUCATION/TRAINING PROGRAM

## 2023-12-11 PROCEDURE — 160036 HCHG PACU - EA ADDL 30 MINS PHASE I: Performed by: STUDENT IN AN ORGANIZED HEALTH CARE EDUCATION/TRAINING PROGRAM

## 2023-12-11 PROCEDURE — 160047 HCHG PACU  - EA ADDL 30 MINS PHASE II: Performed by: STUDENT IN AN ORGANIZED HEALTH CARE EDUCATION/TRAINING PROGRAM

## 2023-12-11 PROCEDURE — 700111 HCHG RX REV CODE 636 W/ 250 OVERRIDE (IP): Mod: JZ | Performed by: STUDENT IN AN ORGANIZED HEALTH CARE EDUCATION/TRAINING PROGRAM

## 2023-12-11 PROCEDURE — 700105 HCHG RX REV CODE 258: Performed by: STUDENT IN AN ORGANIZED HEALTH CARE EDUCATION/TRAINING PROGRAM

## 2023-12-11 PROCEDURE — 36415 COLL VENOUS BLD VENIPUNCTURE: CPT

## 2023-12-11 PROCEDURE — 160002 HCHG RECOVERY MINUTES (STAT): Performed by: STUDENT IN AN ORGANIZED HEALTH CARE EDUCATION/TRAINING PROGRAM

## 2023-12-11 PROCEDURE — 80048 BASIC METABOLIC PNL TOTAL CA: CPT

## 2023-12-11 PROCEDURE — 73060 X-RAY EXAM OF HUMERUS: CPT | Mod: LT

## 2023-12-11 PROCEDURE — RXMED WILLOW AMBULATORY MEDICATION CHARGE: Performed by: STUDENT IN AN ORGANIZED HEALTH CARE EDUCATION/TRAINING PROGRAM

## 2023-12-11 PROCEDURE — 700111 HCHG RX REV CODE 636 W/ 250 OVERRIDE (IP): Performed by: ANESTHESIOLOGY

## 2023-12-11 PROCEDURE — 160046 HCHG PACU - 1ST 60 MINS PHASE II: Performed by: STUDENT IN AN ORGANIZED HEALTH CARE EDUCATION/TRAINING PROGRAM

## 2023-12-11 PROCEDURE — 160035 HCHG PACU - 1ST 60 MINS PHASE I: Performed by: STUDENT IN AN ORGANIZED HEALTH CARE EDUCATION/TRAINING PROGRAM

## 2023-12-11 PROCEDURE — 82962 GLUCOSE BLOOD TEST: CPT

## 2023-12-11 DEVICE — IMPLANTABLE DEVICE: Type: IMPLANTABLE DEVICE | Site: SHOULDER | Status: FUNCTIONAL

## 2023-12-11 DEVICE — 3.5MM CORTEX TI SCREW 3.5MM L32MM: Type: IMPLANTABLE DEVICE | Site: SHOULDER | Status: FUNCTIONAL

## 2023-12-11 RX ORDER — ONDANSETRON 2 MG/ML
INJECTION INTRAMUSCULAR; INTRAVENOUS PRN
Status: DISCONTINUED | OUTPATIENT
Start: 2023-12-11 | End: 2023-12-11 | Stop reason: SURG

## 2023-12-11 RX ORDER — OXYCODONE HYDROCHLORIDE 5 MG/1
5 TABLET ORAL EVERY 4 HOURS PRN
Qty: 30 TABLET | Refills: 0 | Status: SHIPPED | OUTPATIENT
Start: 2023-12-11 | End: 2023-12-11 | Stop reason: SDUPTHER

## 2023-12-11 RX ORDER — VANCOMYCIN HYDROCHLORIDE 1 G/20ML
INJECTION, POWDER, LYOPHILIZED, FOR SOLUTION INTRAVENOUS
Status: COMPLETED | OUTPATIENT
Start: 2023-12-11 | End: 2023-12-11

## 2023-12-11 RX ORDER — ONDANSETRON 4 MG/1
4 TABLET, FILM COATED ORAL EVERY 4 HOURS PRN
Qty: 20 TABLET | Refills: 0 | Status: SHIPPED | OUTPATIENT
Start: 2023-12-11 | End: 2023-12-18

## 2023-12-11 RX ORDER — CEFAZOLIN SODIUM 1 G/3ML
2 INJECTION, POWDER, FOR SOLUTION INTRAMUSCULAR; INTRAVENOUS ONCE
Status: COMPLETED | OUTPATIENT
Start: 2023-12-11 | End: 2023-12-11

## 2023-12-11 RX ORDER — SODIUM CHLORIDE, SODIUM LACTATE, POTASSIUM CHLORIDE, CALCIUM CHLORIDE 600; 310; 30; 20 MG/100ML; MG/100ML; MG/100ML; MG/100ML
INJECTION, SOLUTION INTRAVENOUS CONTINUOUS
Status: ACTIVE | OUTPATIENT
Start: 2023-12-11 | End: 2023-12-11

## 2023-12-11 RX ORDER — DEXAMETHASONE SODIUM PHOSPHATE 4 MG/ML
INJECTION, SOLUTION INTRA-ARTICULAR; INTRALESIONAL; INTRAMUSCULAR; INTRAVENOUS; SOFT TISSUE PRN
Status: DISCONTINUED | OUTPATIENT
Start: 2023-12-11 | End: 2023-12-11 | Stop reason: SURG

## 2023-12-11 RX ORDER — MIDAZOLAM HYDROCHLORIDE 1 MG/ML
INJECTION INTRAMUSCULAR; INTRAVENOUS PRN
Status: DISCONTINUED | OUTPATIENT
Start: 2023-12-11 | End: 2023-12-11 | Stop reason: SURG

## 2023-12-11 RX ORDER — OXYCODONE AND ACETAMINOPHEN 5; 325 MG/1; MG/1
1 TABLET ORAL EVERY 4 HOURS PRN
COMMUNITY
End: 2024-01-09

## 2023-12-11 RX ORDER — ROPIVACAINE HYDROCHLORIDE 5 MG/ML
INJECTION, SOLUTION EPIDURAL; INFILTRATION; PERINEURAL
Status: COMPLETED | OUTPATIENT
Start: 2023-12-11 | End: 2023-12-11

## 2023-12-11 RX ORDER — EPHEDRINE SULFATE 50 MG/ML
INJECTION, SOLUTION INTRAVENOUS PRN
Status: DISCONTINUED | OUTPATIENT
Start: 2023-12-11 | End: 2023-12-11 | Stop reason: SURG

## 2023-12-11 RX ADMIN — PROPOFOL 200 MG: 10 INJECTION, EMULSION INTRAVENOUS at 10:28

## 2023-12-11 RX ADMIN — CEFAZOLIN 2 G: 1 INJECTION, POWDER, FOR SOLUTION INTRAMUSCULAR; INTRAVENOUS at 10:32

## 2023-12-11 RX ADMIN — EPHEDRINE SULFATE 5 MG: 50 INJECTION, SOLUTION INTRAVENOUS at 10:40

## 2023-12-11 RX ADMIN — DEXAMETHASONE SODIUM PHOSPHATE 4 MG: 4 INJECTION INTRA-ARTICULAR; INTRALESIONAL; INTRAMUSCULAR; INTRAVENOUS; SOFT TISSUE at 10:34

## 2023-12-11 RX ADMIN — ONDANSETRON 4 MG: 2 INJECTION INTRAMUSCULAR; INTRAVENOUS at 11:33

## 2023-12-11 RX ADMIN — EPHEDRINE SULFATE 5 MG: 50 INJECTION, SOLUTION INTRAVENOUS at 11:14

## 2023-12-11 RX ADMIN — MIDAZOLAM HYDROCHLORIDE 2 MG: 1 INJECTION, SOLUTION INTRAMUSCULAR; INTRAVENOUS at 10:27

## 2023-12-11 RX ADMIN — SODIUM CHLORIDE, POTASSIUM CHLORIDE, SODIUM LACTATE AND CALCIUM CHLORIDE: 600; 310; 30; 20 INJECTION, SOLUTION INTRAVENOUS at 11:34

## 2023-12-11 RX ADMIN — EPHEDRINE SULFATE 15 MG: 50 INJECTION, SOLUTION INTRAVENOUS at 10:35

## 2023-12-11 RX ADMIN — EPHEDRINE SULFATE 5 MG: 50 INJECTION, SOLUTION INTRAVENOUS at 10:50

## 2023-12-11 RX ADMIN — FENTANYL CITRATE 50 MCG: 50 INJECTION, SOLUTION INTRAMUSCULAR; INTRAVENOUS at 10:27

## 2023-12-11 RX ADMIN — FENTANYL CITRATE 50 MCG: 50 INJECTION, SOLUTION INTRAMUSCULAR; INTRAVENOUS at 10:32

## 2023-12-11 RX ADMIN — ROPIVACAINE HYDROCHLORIDE 30 ML: 5 INJECTION EPIDURAL; INFILTRATION; PERINEURAL at 10:14

## 2023-12-11 RX ADMIN — SODIUM CHLORIDE, POTASSIUM CHLORIDE, SODIUM LACTATE AND CALCIUM CHLORIDE: 600; 310; 30; 20 INJECTION, SOLUTION INTRAVENOUS at 09:27

## 2023-12-11 RX ADMIN — EPHEDRINE SULFATE 10 MG: 50 INJECTION, SOLUTION INTRAVENOUS at 10:58

## 2023-12-11 ASSESSMENT — PAIN SCALES - GENERAL: PAIN_LEVEL: 0

## 2023-12-11 ASSESSMENT — FIBROSIS 4 INDEX
FIB4 SCORE: 1.42
FIB4 SCORE: 1.42

## 2023-12-11 ASSESSMENT — PAIN DESCRIPTION - PAIN TYPE
TYPE: SURGICAL PAIN
TYPE: SURGICAL PAIN

## 2023-12-11 NOTE — ANESTHESIA PREPROCEDURE EVALUATION
Case: [id number] Date/Time: 12/11/23 1015    Procedures:       LEFT OPEN REDUCTION INTERNAL FIXATION  HUMERUS, PROXIMAL AND LEFT BICEPS TENODESIS (Left: Arm Upper)      TENODESIS, BICEPS, OPEN (Left: Shoulder)    Anesthesia type: General    Diagnosis: Closed 3-part fracture of proximal humerus [S42.293A]    Pre-op diagnosis: Closed 3-part fracture of proximal humerus [S42.293A]    Location: [place] [address] / SURGERY [facility]    Surgeons: Aris Pierre M.D.            Relevant Problems   CARDIAC   (positive) Coronary artery disease involving native coronary artery of native heart without angina pectoris   (positive) Hypertension   (positive) NSTEMI (non-ST elevated myocardial infarction) ([facility])   (positive) Presence of stent in coronary artery         (positive) Stage 3b chronic kidney disease (HCC)      ENDO   (positive) Type 2 diabetes mellitus with foot ulcer (CODE) ([facility])      Other   (positive) Osteomyelitis of left foot ([facility])       Physical Exam    Airway   Mallampati: II  TM distance: >3 FB  Neck ROM: full       Cardiovascular - normal exam  Rhythm: regular  Rate: normal  (-) murmur     Dental - normal exam           Pulmonary - normal exam  Breath sounds clear to auscultation     Abdominal    Neurological - normal exam                   Anesthesia Plan    ASA 3   ASA physical status 3 criteria: COPD - poorly controlled, hypertension - poorly controlled and moderate reduction of ejection fraction    Plan - general and peripheral nerve block     Peripheral nerve block will be post-op pain control  Airway plan will be LMA          Induction: intravenous    Postoperative Plan: Postoperative administration of opioids is intended.    Pertinent diagnostic labs and testing reviewed    Informed Consent:    Anesthetic plan and risks discussed with patient.    Use of blood products discussed with: patient whom consented to blood products.

## 2023-12-11 NOTE — DISCHARGE INSTRUCTIONS
HOME CARE INSTRUCTIONS    ACTIVITY: Rest and take it easy for the first 24 hours.  A responsible adult is recommended to remain with you during that time.  It is normal to feel sleepy.  We encourage you to not do anything that requires balance, judgment or coordination.    FOR 24 HOURS DO NOT:  Drive, operate machinery or run household appliances.  Drink beer or alcoholic beverages.  Make important decisions or sign legal documents.    SPECIAL INSTRUCTIONS:   No lifting with your left upper extremity  Use your sling for the first 2 weeks  Dressings may come off 3 days after surgery, okay to shower at that point  No bathing or soaking the wound  Take pain medication as needed for the first couple days and switch to Tylenol and ibuprofen  Follow-up in clinic in 2 weeks for wound check and staple removal    DIET: To avoid nausea, slowly advance diet as tolerated, avoiding spicy or greasy foods for the first day.  Add more substantial food to your diet according to your physician's instructions.  Babies can be fed formula or breast milk as soon as they are hungry.  INCREASE FLUIDS AND FIBER TO AVOID CONSTIPATION.    MEDICATIONS: Resume taking daily medication.  Take prescribed pain medication with food.  If no medication is prescribed, you may take non-aspirin pain medication if needed.  PAIN MEDICATION CAN BE VERY CONSTIPATING.  Take a stool softener or laxative such as senokot, pericolace, or milk of magnesia if needed.    Prescription given for _Roxicodone and Zofran_.  Last medication given - _4 mg of Zofran at 11:33am_.    A follow-up appointment should be arranged with your doctor; call to schedule.    You should CALL YOUR PHYSICIAN if you develop:  Fever greater than 101 degrees F.  Pain not relieved by medication, or persistent nausea or vomiting.  Excessive bleeding (blood soaking through dressing) or unexpected drainage from the wound.  Extreme redness or swelling around the incision site, drainage of pus or  foul smelling drainage.  Inability to urinate or empty your bladder within 8 hours.  Problems with breathing or chest pain.    You should call 911 if you develop problems with breathing or chest pain.  If you are unable to contact your doctor or surgical center, you should go to the nearest emergency room or urgent care center.  Physician's telephone #: [phone]    MILD FLU-LIKE SYMPTOMS ARE NORMAL.  YOU MAY EXPERIENCE GENERALIZED MUSCLE ACHES, THROAT IRRITATION, HEADACHE AND/OR SOME NAUSEA.    If any questions arise, call your doctor.  If your doctor is not available, please feel free to call the Surgical Center at [phone].  The Center is open Monday through Friday from 7AM to 7PM.      A registered nurse may call you a few days after your surgery to see how you are doing after your procedure.    You may also receive a survey in the mail within the next two weeks and we ask that you take a few moments to complete the survey and return it to us.  Our goal is to provide you with very good care and we value your comments.     Depression / Suicide Risk    As you are discharged from this [facility] facility, it is important to learn how to keep safe from harming yourself.    Recognize the warning signs:  Abrupt changes in personality, positive or negative- including increase in energy   Giving away possessions  Change in eating patterns- significant weight changes-  positive or negative  Change in sleeping patterns- unable to sleep or sleeping all the time   Unwillingness or inability to communicate  Depression  Unusual sadness, discouragement and loneliness  Talk of wanting to die  Neglect of personal appearance   Rebelliousness- reckless behavior  Withdrawal from people/activities they love  Confusion- inability to concentrate     If you or a loved one observes any of these behaviors or has concerns about self-harm, here's what you can do:  Talk about it- your feelings and reasons for harming  yourself  Remove any means that you might use to hurt yourself (examples: pills, rope, extension cords, firearm)  Get professional help from the community (Mental Health, Substance Abuse, psychological counseling)  Do not be alone:Call your Safe Contact- someone whom you trust who will be there for you.  Call your local CRISIS HOTLINE 530-0288 or 395-228-4760  Call your local Children's Mobile Crisis Response Team [place] (019) 708-4842 or www.[facility] [place]  Call the toll free National Suicide Prevention Hotlines   National Suicide Prevention Lifeline 712-110-GCYA (7672)  National Hope Line Network 800-SUICIDE (307-2411)    I acknowledge receipt and understanding of these Home Care instructions.

## 2023-12-11 NOTE — ANESTHESIA TIME REPORT
Anesthesia Start and Stop Event Times       Date Time Event    12/11/2023 1024 Ready for Procedure     1024 Anesthesia Start          Responsible Staff  12/11/23      Name Role Begin End    Kirill Pleayo M.D. Anesth 1024           Overtime Reason:  no overtime (within assigned shift)    Comments: [facility] in preop

## 2023-12-11 NOTE — ANESTHESIA PROCEDURE NOTES
Airway    Date/Time: 12/11/2023 10:29 AM    Performed by: Kirill Pelayo M.D.  Authorized by: Kirill Pelayo M.D.    Location:  OR  Urgency:  Elective  Difficult Airway: No    Indications for Airway Management:  Anesthesia      Spontaneous Ventilation: absent    Sedation Level:  Deep  Preoxygenated: Yes    Final Airway Type:  Supraglottic airway  Final Supraglottic Airway:  Standard LMA    SGA Size:  5  Number of Attempts at Approach:  1  Ventilation Between Attempts:  2 hand mask

## 2023-12-11 NOTE — H&P
Surgery Orthopedic History & Physical Note    Date  12/11/2023    Primary Care Physician  Florinda Pascual M.D.      Pre-Op Diagnosis Codes:     * Closed 3-part fracture of proximal humerus [S42.293A]    HPI  Chief Complaint:  Pain of the Left Shoulder     Last Surgery: No surgery found on No surgery found     HPI  Pain Assessment  Pain Assessment: 0-10  Pain Score: 5 - Moderate pain  Pain Location: Shoulder  Pain Orientation: Left  Pain Descriptors: Numbness, Sharp  Pain Frequency: Intermittent     Patient is here for initial evaluation for left proximal humerus fracture sustained after fall from the roof while putting up [place] lights.  He is seen in outside facility where x-rays revealed displaced proximal humerus surgical neck fracture.  He is here for initial evaluation.  He is having significant pain at this point time.  He denies numbness or tingling.              Review of Systems           Objective []Expand by Default  General: Well nourished, well developed, age appropriate appearance   HEENT: Normocephalic, atraumatic  Psych: Normal mood and affect  Neck: Supple, no pain to motion  Chest/Pulmonary: breathing unlabored, no audible wheezing  Ortho: Left shoulder: Moderate bruising noted.  Sensation intact to axillary radial and median nerves.  Motion not tested.     Last Imaging Result(s):   DX-SHOULDER 2+ LEFT  Narrative: 12/4/2023 3:32 PM     HISTORY/REASON FOR EXAM:  Pain/Deformity Following Trauma.     TECHNIQUE/EXAM DESCRIPTION AND NUMBER OF VIEWS:  2 views of the  LEFT shoulder.     COMPARISON: None     FINDINGS:     MINERALIZATION: Decreased.     INJURY: Comminuted impacted angulated fracture of the humeral neck. Extension into the articular surface of the humeral head is not excluded..     JOINTS: No erosive arthropathy is evident.  Impression: 1.  Fracture of the proximal humerus as described above  2.  Osteopenia              Assessment & Plan   Encounter Diagnoses:   Other closed displaced  fracture of proximal end of left humerus, initial encounter     We discussed x-ray findings show displaced 2 part surgical neck proximal humerus fracture.  Recommend surgical fixation Renae based on amount of displacement and his age.  We discussed risk and benefits including bleeding infection nonunion malunion need for further surgery.  He understands his diabetes puts him at higher risk of wound complications.     Assessment/Plan:  Pre-Op Diagnosis Codes:     * Closed 3-part fracture of proximal humerus [S42.293A]  Procedure(s):  LEFT OPEN REDUCTION INTERNAL FIXATION  HUMERUS, PROXIMAL AND LEFT BICEPS TENODESIS  TENODESIS, BICEPS, OPEN

## 2023-12-11 NOTE — PROGRESS NOTES
Med Rec complete per PT  Allergies Reviewed    Pt reports NOT taking anticoagulant in the last 14 days

## 2023-12-11 NOTE — ANESTHESIA PROCEDURE NOTES
Peripheral Block    Date/Time: 12/11/2023 10:14 AM    Performed by: Kirill Pelayo M.D.  Authorized by: Kirill Pelayo M.D.    Patient Location:  Pre-op  Start Time:  12/11/2023 10:14 AM  End Time:  12/11/2023 10:16 AM  Reason for Block: at surgeon's request and post-op pain management ONLY    patient identified, IV checked, site marked, risks and benefits discussed, surgical consent, monitors and equipment checked, pre-op evaluation and timeout performed    Patient Position:  Supine  Prep: ChloraPrep    Monitoring:  Heart rate, continuous pulse ox and cardiac monitor  Block Region:  Upper Extremity  Upper Extremity - Block Type:  BRACHIAL PLEXUS block, Supraclavicular approach    Laterality:  Left  Procedures: ultrasound guided  Image captured, interpreted and electronically stored.  Local Infiltration:  Lidocaine  Strength:  1 %  Dose:  3 ml  Block Type:  Single-shot  Needle Length:  50mm  Needle Gauge:  22 G  Needle Localization:  Ultrasound guidance  Ultrasound picture in chart  Injection Assessment:  Negative aspiration for heme, no paresthesia on injection, incremental injection and local visualized surrounding nerve on ultrasound  Evidence of intravascular injection: No

## 2023-12-11 NOTE — ANESTHESIA TIME REPORT
Anesthesia Start and Stop Event Times       Date Time Event    12/11/2023 1024 Ready for Procedure     1024 Anesthesia Start     1200 Anesthesia Stop          Responsible Staff  12/11/23      Name Role Begin End    Kirill Pelayo M.D. Anesth 1024 1200          Overtime Reason:  no overtime (within assigned shift)    Comments: [facility] IN PREOP

## 2023-12-11 NOTE — ANESTHESIA POSTPROCEDURE EVALUATION
Patient: Beni Moore    Procedure Summary       Date: 12/11/23 Room / Location: [place] [address] / SURGERY [facility]    Anesthesia Start: 1024 Anesthesia Stop:     Procedures:       LEFT OPEN REDUCTION INTERNAL FIXATION  HUMERUS, PROXIMAL AND LEFT BICEPS TENODESIS (Left: Arm Upper)      TENODESIS, BICEPS, OPEN (Left: Shoulder) Diagnosis:       Closed 3-part fracture of proximal humerus      (Closed 3-part fracture of proximal humerus [S42.293A])    Surgeons: Aris Pierre M.D. Responsible Provider: Kirill Pelayo M.D.    Anesthesia Type: general, peripheral nerve block ASA Status: 3            Final Anesthesia Type: general, peripheral nerve block  Last vitals  BP   Blood Pressure: 108/56    Temp   35.9 °C (96.7 °F)    Pulse   75   Resp   20    SpO2   99 %      Anesthesia Post Evaluation    Patient location during evaluation: PACU  Patient participation: complete - patient participated  Level of consciousness: awake and alert  Pain score: 0    Airway patency: patent  Anesthetic complications: no  Cardiovascular status: hemodynamically stable  Respiratory status: acceptable  Hydration status: euvolemic    PONV: none          No notable events documented.     Nurse Pain Score: 6 (NPRS)

## 2023-12-11 NOTE — OP REPORT
DATE OF OPERATION: 12/11/2023     PREOPERATIVE DIAGNOSIS: Displaced left proximal humerus fracture    POSTOPERATIVE DIAGNOSIS: Same    PROCEDURE PERFORMED:   1.  Open reduction internal fixation left proximal humerus  2.  Left biceps tenodesis    SURGEON: Aris Pierre M.D.     ASSISTANT: Zacahry Carrera MD, fellow    ANESTHESIA: General    SPECIMEN: None    ESTIMATED BLOOD LOSS: 20 mL    IMPLANTS: [place] proximal humerus locking plate      INDICATIONS: The patient is a 53 y.o. male who presented with above.  I discussed the risks and benefits of the procedure which include but are not limited to risks of infection, wound healing complication, neurovascular injury, pain, malunion, non-union, malrotation, and the medical risks of anesthesia including MI, stroke, and death.  Alternatives to surgery were also discussed, including non-operative management, which I did not recommend.  The patient was in agreement with the plan to proceed, and the informed consent was signed and documented.  I met with the patient pre-operatively and marked the operative extremity with their agreement.  We proceeded to the operating room.     DESCRIPTION OF PROCEDURE:  Patient was seen in the preoperative holding area on the day of surgery. The operative site was marked with my initials.  he was taken to the operating room and placed supine on the operative table.  Anesthesia was induced.  The operative extremity was prepped and draped in the normal sterile fashion.  Operative pause was conducted and the correct patient, site, side, procedure, and surgeon's initials on extremity were identified.  Standard deltopectoral approach was performed.  Skin was incised with a skin knife.  Blunt dissection carried down to the fat stripe.  Cephalic vein was identified in the interval exploited.  Retractors were placed.  Biceps tendon was identified and released.  We performed a biceps tenodesis to the pectoralis major tendon.  Biceps tendon was  then released.  Fracture site was identified and cleaned of hematoma and soft tissue bursal.  Using combination of abduction shoulder flexion and rotation were used to reduce the surgical neck fracture.  We also used point reduction clamp to fine-tune our reduction.  We then placed several 2.0 mm K wires through the humeral head to provisionally secure reduction.  We then placed our plate just lateral to the biceps tendon.  Once appropriate position we then drilled and placed a single nonlocking shaft screw followed by an additional nonlocking screw into the head region.  We rechecked her AP and lateral views ensuring our reduction and plate position.  We then drilled and placed multiple unicortical locking screws into the head.  We then drilled and placed several additional shaft screws.  Finally we drilled and placed a single cancellous screw into the calcar region for additional support.  This point time retractors were removed and final images were obtained including live fluoroscopic view of the shoulder joint brought through full motion with the approach withdrawal technique to ensure no intra-articular screw penetration.  This point time we are satisfied with our reduction implant position.  The wound was then thoroughly irrigated sterile saline.  Vancomycin powder was placed in the wound.  The wound was then closed in layered fashion 0 Vicryl 2-0 Vicryl and staples for skin.  Patient was placed back into a sling.  He is awoken taken to PACU in stable condition.    POSTOPERATIVE PLAN: Nonweightbearing left upper extremity.  Sling for comfort first 2 weeks.  Discharge home from PACU today when appropriate.  The patient will follow up in clinic in 2 weeks to check wounds and remove sutures/staples.      ____________________________________   Aris Pierre M.D.   DD: 12/11/2023  11:37 AM

## 2023-12-12 RX ORDER — ENALAPRIL MALEATE 20 MG/1
10 TABLET ORAL 2 TIMES DAILY
Qty: 90 TABLET | Refills: 3 | Status: ON HOLD | OUTPATIENT
Start: 2023-12-12 | End: 2024-02-21

## 2023-12-26 DIAGNOSIS — Z89.431 S/P TRANSMETATARSAL AMPUTATION OF FOOT, RIGHT (HCC): ICD-10-CM

## 2023-12-26 DIAGNOSIS — E11.621 TYPE 2 DIABETES MELLITUS WITH FOOT ULCER (CODE) (HCC): ICD-10-CM

## 2024-01-09 ENCOUNTER — APPOINTMENT (OUTPATIENT)
Dept: RADIOLOGY | Facility: MEDICAL CENTER | Age: 54
DRG: 683 | End: 2024-01-09
Attending: STUDENT IN AN ORGANIZED HEALTH CARE EDUCATION/TRAINING PROGRAM
Payer: MEDICARE

## 2024-01-09 ENCOUNTER — HOSPITAL ENCOUNTER (INPATIENT)
Facility: MEDICAL CENTER | Age: 54
LOS: 2 days | DRG: 683 | End: 2024-01-13
Attending: EMERGENCY MEDICINE | Admitting: STUDENT IN AN ORGANIZED HEALTH CARE EDUCATION/TRAINING PROGRAM
Payer: MEDICARE

## 2024-01-09 ENCOUNTER — APPOINTMENT (OUTPATIENT)
Dept: RADIOLOGY | Facility: MEDICAL CENTER | Age: 54
DRG: 683 | End: 2024-01-09
Attending: EMERGENCY MEDICINE
Payer: MEDICARE

## 2024-01-09 ENCOUNTER — APPOINTMENT (OUTPATIENT)
Dept: URGENT CARE | Facility: PHYSICIAN GROUP | Age: 54
End: 2024-01-09
Payer: MEDICARE

## 2024-01-09 ENCOUNTER — APPOINTMENT (OUTPATIENT)
Dept: RADIOLOGY | Facility: MEDICAL CENTER | Age: 54
DRG: 683 | End: 2024-01-09
Payer: MEDICARE

## 2024-01-09 DIAGNOSIS — N18.32 STAGE 3B CHRONIC KIDNEY DISEASE: ICD-10-CM

## 2024-01-09 DIAGNOSIS — N17.9 AKI (ACUTE KIDNEY INJURY) (HCC): ICD-10-CM

## 2024-01-09 DIAGNOSIS — J10.1 INFLUENZA A: ICD-10-CM

## 2024-01-09 DIAGNOSIS — I95.9 HYPOTENSION, UNSPECIFIED HYPOTENSION TYPE: ICD-10-CM

## 2024-01-09 DIAGNOSIS — R55 SYNCOPE, UNSPECIFIED SYNCOPE TYPE: ICD-10-CM

## 2024-01-09 PROBLEM — N18.9 CKD (CHRONIC KIDNEY DISEASE): Status: ACTIVE | Noted: 2024-01-09

## 2024-01-09 PROBLEM — Z79.4 TYPE 2 DIABETES MELLITUS WITH KIDNEY COMPLICATION, WITH LONG-TERM CURRENT USE OF INSULIN (HCC): Status: ACTIVE | Noted: 2021-07-16

## 2024-01-09 PROBLEM — E11.29 TYPE 2 DIABETES MELLITUS WITH KIDNEY COMPLICATION, WITH LONG-TERM CURRENT USE OF INSULIN (HCC): Status: ACTIVE | Noted: 2021-07-16

## 2024-01-09 LAB
ALBUMIN SERPL BCP-MCNC: 3 G/DL (ref 3.2–4.9)
ALBUMIN/GLOB SERPL: 0.9 G/DL
ALP SERPL-CCNC: 84 U/L (ref 30–99)
ALT SERPL-CCNC: 35 U/L (ref 2–50)
ANION GAP SERPL CALC-SCNC: 16 MMOL/L (ref 7–16)
APPEARANCE UR: CLEAR
AST SERPL-CCNC: 42 U/L (ref 12–45)
BACTERIA #/AREA URNS HPF: NEGATIVE /HPF
BASOPHILS # BLD AUTO: 0.4 % (ref 0–1.8)
BASOPHILS # BLD: 0.02 K/UL (ref 0–0.12)
BILIRUB SERPL-MCNC: 0.7 MG/DL (ref 0.1–1.5)
BILIRUB UR QL STRIP.AUTO: NEGATIVE
BUN SERPL-MCNC: 81 MG/DL (ref 8–22)
CALCIUM ALBUM COR SERPL-MCNC: 8.8 MG/DL (ref 8.5–10.5)
CALCIUM SERPL-MCNC: 8 MG/DL (ref 8.5–10.5)
CHLORIDE SERPL-SCNC: 100 MMOL/L (ref 96–112)
CHOLEST SERPL-MCNC: 107 MG/DL (ref 100–199)
CO2 SERPL-SCNC: 17 MMOL/L (ref 20–33)
COLOR UR: YELLOW
CREAT SERPL-MCNC: 4.55 MG/DL (ref 0.5–1.4)
EKG IMPRESSION: NORMAL
EOSINOPHIL # BLD AUTO: 0 K/UL (ref 0–0.51)
EOSINOPHIL NFR BLD: 0 % (ref 0–6.9)
EPI CELLS #/AREA URNS HPF: NEGATIVE /HPF
ERYTHROCYTE [DISTWIDTH] IN BLOOD BY AUTOMATED COUNT: 43.4 FL (ref 35.9–50)
EST. AVERAGE GLUCOSE BLD GHB EST-MCNC: 160 MG/DL
GFR SERPLBLD CREATININE-BSD FMLA CKD-EPI: 14 ML/MIN/1.73 M 2
GLOBULIN SER CALC-MCNC: 3.2 G/DL (ref 1.9–3.5)
GLUCOSE SERPL-MCNC: 152 MG/DL (ref 65–99)
GLUCOSE UR STRIP.AUTO-MCNC: 100 MG/DL
HBA1C MFR BLD: 7.2 % (ref 4–5.6)
HCT VFR BLD AUTO: 31.4 % (ref 42–52)
HDLC SERPL-MCNC: 36 MG/DL
HGB BLD-MCNC: 10.4 G/DL (ref 14–18)
HYALINE CASTS #/AREA URNS LPF: ABNORMAL /LPF
IMM GRANULOCYTES # BLD AUTO: 0.03 K/UL (ref 0–0.11)
IMM GRANULOCYTES NFR BLD AUTO: 0.5 % (ref 0–0.9)
KETONES UR STRIP.AUTO-MCNC: NEGATIVE MG/DL
LACTATE SERPL-SCNC: 1.6 MMOL/L (ref 0.5–2)
LDLC SERPL CALC-MCNC: 43 MG/DL
LEUKOCYTE ESTERASE UR QL STRIP.AUTO: NEGATIVE
LYMPHOCYTES # BLD AUTO: 2.49 K/UL (ref 1–4.8)
LYMPHOCYTES NFR BLD: 44.4 % (ref 22–41)
MAGNESIUM SERPL-MCNC: 1.4 MG/DL (ref 1.5–2.5)
MCH RBC QN AUTO: 29.5 PG (ref 27–33)
MCHC RBC AUTO-ENTMCNC: 33.1 G/DL (ref 32.3–36.5)
MCV RBC AUTO: 89 FL (ref 81.4–97.8)
MICRO URNS: ABNORMAL
MONOCYTES # BLD AUTO: 0.59 K/UL (ref 0–0.85)
MONOCYTES NFR BLD AUTO: 10.5 % (ref 0–13.4)
NEUTROPHILS # BLD AUTO: 2.48 K/UL (ref 1.82–7.42)
NEUTROPHILS NFR BLD: 44.2 % (ref 44–72)
NITRITE UR QL STRIP.AUTO: NEGATIVE
NRBC # BLD AUTO: 0 K/UL
NRBC BLD-RTO: 0 /100 WBC (ref 0–0.2)
NT-PROBNP SERPL IA-MCNC: 606 PG/ML (ref 0–125)
PH UR STRIP.AUTO: 5 [PH] (ref 5–8)
PHOSPHATE SERPL-MCNC: 6.4 MG/DL (ref 2.5–4.5)
PLATELET # BLD AUTO: 216 K/UL (ref 164–446)
PMV BLD AUTO: 10.4 FL (ref 9–12.9)
POTASSIUM SERPL-SCNC: 3.7 MMOL/L (ref 3.6–5.5)
PROT SERPL-MCNC: 6.2 G/DL (ref 6–8.2)
PROT UR QL STRIP: 300 MG/DL
RBC # BLD AUTO: 3.53 M/UL (ref 4.7–6.1)
RBC # URNS HPF: ABNORMAL /HPF
RBC UR QL AUTO: ABNORMAL
SODIUM SERPL-SCNC: 133 MMOL/L (ref 135–145)
SP GR UR STRIP.AUTO: 1.01
TRIGL SERPL-MCNC: 138 MG/DL (ref 0–149)
TROPONIN T SERPL-MCNC: 104 NG/L (ref 6–19)
TROPONIN T SERPL-MCNC: 94 NG/L (ref 6–19)
UROBILINOGEN UR STRIP.AUTO-MCNC: 0.2 MG/DL
WBC # BLD AUTO: 5.6 K/UL (ref 4.8–10.8)
WBC #/AREA URNS HPF: ABNORMAL /HPF

## 2024-01-09 PROCEDURE — 83605 ASSAY OF LACTIC ACID: CPT

## 2024-01-09 PROCEDURE — 93005 ELECTROCARDIOGRAM TRACING: CPT

## 2024-01-09 PROCEDURE — 96372 THER/PROPH/DIAG INJ SC/IM: CPT

## 2024-01-09 PROCEDURE — 80061 LIPID PANEL: CPT

## 2024-01-09 PROCEDURE — 36415 COLL VENOUS BLD VENIPUNCTURE: CPT

## 2024-01-09 PROCEDURE — 700102 HCHG RX REV CODE 250 W/ 637 OVERRIDE(OP): Performed by: STUDENT IN AN ORGANIZED HEALTH CARE EDUCATION/TRAINING PROGRAM

## 2024-01-09 PROCEDURE — 84100 ASSAY OF PHOSPHORUS: CPT

## 2024-01-09 PROCEDURE — 81001 URINALYSIS AUTO W/SCOPE: CPT

## 2024-01-09 PROCEDURE — 99223 1ST HOSP IP/OBS HIGH 75: CPT | Mod: AI | Performed by: STUDENT IN AN ORGANIZED HEALTH CARE EDUCATION/TRAINING PROGRAM

## 2024-01-09 PROCEDURE — 85025 COMPLETE CBC W/AUTO DIFF WBC: CPT

## 2024-01-09 PROCEDURE — 83036 HEMOGLOBIN GLYCOSYLATED A1C: CPT

## 2024-01-09 PROCEDURE — 71045 X-RAY EXAM CHEST 1 VIEW: CPT

## 2024-01-09 PROCEDURE — 700111 HCHG RX REV CODE 636 W/ 250 OVERRIDE (IP): Performed by: STUDENT IN AN ORGANIZED HEALTH CARE EDUCATION/TRAINING PROGRAM

## 2024-01-09 PROCEDURE — A9270 NON-COVERED ITEM OR SERVICE: HCPCS | Performed by: STUDENT IN AN ORGANIZED HEALTH CARE EDUCATION/TRAINING PROGRAM

## 2024-01-09 PROCEDURE — 93005 ELECTROCARDIOGRAM TRACING: CPT | Performed by: EMERGENCY MEDICINE

## 2024-01-09 PROCEDURE — 83880 ASSAY OF NATRIURETIC PEPTIDE: CPT

## 2024-01-09 PROCEDURE — 700105 HCHG RX REV CODE 258: Performed by: STUDENT IN AN ORGANIZED HEALTH CARE EDUCATION/TRAINING PROGRAM

## 2024-01-09 PROCEDURE — 80053 COMPREHEN METABOLIC PANEL: CPT

## 2024-01-09 PROCEDURE — 700105 HCHG RX REV CODE 258: Performed by: EMERGENCY MEDICINE

## 2024-01-09 PROCEDURE — 76775 US EXAM ABDO BACK WALL LIM: CPT

## 2024-01-09 PROCEDURE — 84484 ASSAY OF TROPONIN QUANT: CPT | Mod: 91

## 2024-01-09 PROCEDURE — 83735 ASSAY OF MAGNESIUM: CPT

## 2024-01-09 PROCEDURE — G0378 HOSPITAL OBSERVATION PER HR: HCPCS

## 2024-01-09 PROCEDURE — 99285 EMERGENCY DEPT VISIT HI MDM: CPT

## 2024-01-09 RX ORDER — ACETAMINOPHEN 325 MG/1
650 TABLET ORAL EVERY 6 HOURS PRN
Status: DISCONTINUED | OUTPATIENT
Start: 2024-01-09 | End: 2024-01-13 | Stop reason: HOSPADM

## 2024-01-09 RX ORDER — HYDROCODONE BITARTRATE AND ACETAMINOPHEN 5; 325 MG/1; MG/1
1 TABLET ORAL EVERY 4 HOURS PRN
COMMUNITY
End: 2024-01-25

## 2024-01-09 RX ORDER — CHOLESTYRAMINE LIGHT 4 G/5.7G
1 POWDER, FOR SUSPENSION ORAL 2 TIMES DAILY
Status: DISCONTINUED | OUTPATIENT
Start: 2024-01-09 | End: 2024-01-13 | Stop reason: HOSPADM

## 2024-01-09 RX ORDER — SODIUM CHLORIDE, SODIUM LACTATE, POTASSIUM CHLORIDE, CALCIUM CHLORIDE 600; 310; 30; 20 MG/100ML; MG/100ML; MG/100ML; MG/100ML
1000 INJECTION, SOLUTION INTRAVENOUS ONCE
Status: COMPLETED | OUTPATIENT
Start: 2024-01-09 | End: 2024-01-09

## 2024-01-09 RX ORDER — POLYETHYLENE GLYCOL 3350 17 G/17G
1 POWDER, FOR SOLUTION ORAL
Status: DISCONTINUED | OUTPATIENT
Start: 2024-01-09 | End: 2024-01-13 | Stop reason: HOSPADM

## 2024-01-09 RX ORDER — HEPARIN SODIUM 5000 [USP'U]/ML
5000 INJECTION, SOLUTION INTRAVENOUS; SUBCUTANEOUS EVERY 8 HOURS
Status: DISCONTINUED | OUTPATIENT
Start: 2024-01-09 | End: 2024-01-13 | Stop reason: HOSPADM

## 2024-01-09 RX ORDER — ASPIRIN 81 MG/1
81 TABLET ORAL DAILY
Status: DISCONTINUED | OUTPATIENT
Start: 2024-01-10 | End: 2024-01-13 | Stop reason: HOSPADM

## 2024-01-09 RX ORDER — AMOXICILLIN 250 MG
2 CAPSULE ORAL 2 TIMES DAILY
Status: DISCONTINUED | OUTPATIENT
Start: 2024-01-09 | End: 2024-01-13 | Stop reason: HOSPADM

## 2024-01-09 RX ORDER — BISACODYL 10 MG
10 SUPPOSITORY, RECTAL RECTAL
Status: DISCONTINUED | OUTPATIENT
Start: 2024-01-09 | End: 2024-01-13 | Stop reason: HOSPADM

## 2024-01-09 RX ORDER — ATORVASTATIN CALCIUM 40 MG/1
80 TABLET, FILM COATED ORAL EVERY EVENING
Status: DISCONTINUED | OUTPATIENT
Start: 2024-01-09 | End: 2024-01-13 | Stop reason: HOSPADM

## 2024-01-09 RX ORDER — SODIUM CHLORIDE 9 MG/ML
INJECTION, SOLUTION INTRAVENOUS CONTINUOUS
Status: ACTIVE | OUTPATIENT
Start: 2024-01-09 | End: 2024-01-10

## 2024-01-09 RX ORDER — OMEPRAZOLE 20 MG/1
20 CAPSULE, DELAYED RELEASE ORAL EVERY MORNING
Status: DISCONTINUED | OUTPATIENT
Start: 2024-01-10 | End: 2024-01-13 | Stop reason: HOSPADM

## 2024-01-09 RX ADMIN — SODIUM CHLORIDE: 9 INJECTION, SOLUTION INTRAVENOUS at 21:37

## 2024-01-09 RX ADMIN — HEPARIN SODIUM 5000 UNITS: 5000 INJECTION, SOLUTION INTRAVENOUS; SUBCUTANEOUS at 21:37

## 2024-01-09 RX ADMIN — ATORVASTATIN CALCIUM 80 MG: 80 TABLET, FILM COATED ORAL at 21:36

## 2024-01-09 RX ADMIN — SODIUM CHLORIDE, POTASSIUM CHLORIDE, SODIUM LACTATE AND CALCIUM CHLORIDE 1000 ML: 600; 310; 30; 20 INJECTION, SOLUTION INTRAVENOUS at 19:21

## 2024-01-09 ASSESSMENT — ENCOUNTER SYMPTOMS
DOUBLE VISION: 0
PHOTOPHOBIA: 0
NECK PAIN: 0
ABDOMINAL PAIN: 0
CHILLS: 0
HEADACHES: 0
FALLS: 1
DEPRESSION: 0
BACK PAIN: 0
HEMOPTYSIS: 0
VOMITING: 0
DIARRHEA: 0
DIZZINESS: 0
SHORTNESS OF BREATH: 0
FEVER: 0
HALLUCINATIONS: 0
BLURRED VISION: 0
COUGH: 0
LOSS OF CONSCIOUSNESS: 1
SPUTUM PRODUCTION: 0
SINUS PAIN: 0
ORTHOPNEA: 0
NAUSEA: 0
PALPITATIONS: 0

## 2024-01-09 ASSESSMENT — LIFESTYLE VARIABLES: SUBSTANCE_ABUSE: 0

## 2024-01-09 ASSESSMENT — FIBROSIS 4 INDEX: FIB4 SCORE: 1.42

## 2024-01-10 ENCOUNTER — APPOINTMENT (OUTPATIENT)
Dept: CARDIOLOGY | Facility: MEDICAL CENTER | Age: 54
DRG: 683 | End: 2024-01-10
Attending: STUDENT IN AN ORGANIZED HEALTH CARE EDUCATION/TRAINING PROGRAM
Payer: MEDICARE

## 2024-01-10 LAB
ALBUMIN SERPL BCP-MCNC: 2.2 G/DL (ref 3.2–4.9)
ALBUMIN/GLOB SERPL: 0.7 G/DL
ALP SERPL-CCNC: 69 U/L (ref 30–99)
ALT SERPL-CCNC: 25 U/L (ref 2–50)
ANION GAP SERPL CALC-SCNC: 14 MMOL/L (ref 7–16)
ANION GAP SERPL CALC-SCNC: 16 MMOL/L (ref 7–16)
AST SERPL-CCNC: 29 U/L (ref 12–45)
BILIRUB SERPL-MCNC: 0.5 MG/DL (ref 0.1–1.5)
BUN SERPL-MCNC: 79 MG/DL (ref 8–22)
BUN SERPL-MCNC: 79 MG/DL (ref 8–22)
CALCIUM ALBUM COR SERPL-MCNC: 8.7 MG/DL (ref 8.5–10.5)
CALCIUM SERPL-MCNC: 7 MG/DL (ref 8.5–10.5)
CALCIUM SERPL-MCNC: 7.3 MG/DL (ref 8.5–10.5)
CHLORIDE SERPL-SCNC: 103 MMOL/L (ref 96–112)
CHLORIDE SERPL-SCNC: 103 MMOL/L (ref 96–112)
CO2 SERPL-SCNC: 15 MMOL/L (ref 20–33)
CO2 SERPL-SCNC: 17 MMOL/L (ref 20–33)
CREAT SERPL-MCNC: 3.86 MG/DL (ref 0.5–1.4)
CREAT SERPL-MCNC: 4.18 MG/DL (ref 0.5–1.4)
ERYTHROCYTE [DISTWIDTH] IN BLOOD BY AUTOMATED COUNT: 41 FL (ref 35.9–50)
GFR SERPLBLD CREATININE-BSD FMLA CKD-EPI: 16 ML/MIN/1.73 M 2
GFR SERPLBLD CREATININE-BSD FMLA CKD-EPI: 18 ML/MIN/1.73 M 2
GLOBULIN SER CALC-MCNC: 3 G/DL (ref 1.9–3.5)
GLUCOSE BLD STRIP.AUTO-MCNC: 100 MG/DL (ref 65–99)
GLUCOSE BLD STRIP.AUTO-MCNC: 114 MG/DL (ref 65–99)
GLUCOSE BLD STRIP.AUTO-MCNC: 190 MG/DL (ref 65–99)
GLUCOSE BLD STRIP.AUTO-MCNC: 191 MG/DL (ref 65–99)
GLUCOSE SERPL-MCNC: 122 MG/DL (ref 65–99)
GLUCOSE SERPL-MCNC: 179 MG/DL (ref 65–99)
HCT VFR BLD AUTO: 26 % (ref 42–52)
HGB BLD-MCNC: 9 G/DL (ref 14–18)
LV EJECT FRACT  99904: 67
LV EJECT FRACT MOD 2C 99903: 66.2
LV EJECT FRACT MOD 4C 99902: 62.78
LV EJECT FRACT MOD BP 99901: 66.99
MCH RBC QN AUTO: 29.9 PG (ref 27–33)
MCHC RBC AUTO-ENTMCNC: 34.6 G/DL (ref 32.3–36.5)
MCV RBC AUTO: 86.4 FL (ref 81.4–97.8)
PLATELET # BLD AUTO: 168 K/UL (ref 164–446)
PMV BLD AUTO: 10.4 FL (ref 9–12.9)
POTASSIUM SERPL-SCNC: 2.9 MMOL/L (ref 3.6–5.5)
POTASSIUM SERPL-SCNC: 3.3 MMOL/L (ref 3.6–5.5)
PROT SERPL-MCNC: 5.2 G/DL (ref 6–8.2)
RBC # BLD AUTO: 3.01 M/UL (ref 4.7–6.1)
SODIUM SERPL-SCNC: 134 MMOL/L (ref 135–145)
SODIUM SERPL-SCNC: 134 MMOL/L (ref 135–145)
WBC # BLD AUTO: 4.9 K/UL (ref 4.8–10.8)

## 2024-01-10 PROCEDURE — 700102 HCHG RX REV CODE 250 W/ 637 OVERRIDE(OP): Performed by: STUDENT IN AN ORGANIZED HEALTH CARE EDUCATION/TRAINING PROGRAM

## 2024-01-10 PROCEDURE — 85027 COMPLETE CBC AUTOMATED: CPT

## 2024-01-10 PROCEDURE — G0378 HOSPITAL OBSERVATION PER HR: HCPCS

## 2024-01-10 PROCEDURE — A9270 NON-COVERED ITEM OR SERVICE: HCPCS | Performed by: STUDENT IN AN ORGANIZED HEALTH CARE EDUCATION/TRAINING PROGRAM

## 2024-01-10 PROCEDURE — 96372 THER/PROPH/DIAG INJ SC/IM: CPT

## 2024-01-10 PROCEDURE — 96366 THER/PROPH/DIAG IV INF ADDON: CPT

## 2024-01-10 PROCEDURE — 80053 COMPREHEN METABOLIC PANEL: CPT

## 2024-01-10 PROCEDURE — 93306 TTE W/DOPPLER COMPLETE: CPT | Mod: 26 | Performed by: INTERNAL MEDICINE

## 2024-01-10 PROCEDURE — 96365 THER/PROPH/DIAG IV INF INIT: CPT

## 2024-01-10 PROCEDURE — 80048 BASIC METABOLIC PNL TOTAL CA: CPT

## 2024-01-10 PROCEDURE — 99232 SBSQ HOSP IP/OBS MODERATE 35: CPT | Performed by: STUDENT IN AN ORGANIZED HEALTH CARE EDUCATION/TRAINING PROGRAM

## 2024-01-10 PROCEDURE — 82962 GLUCOSE BLOOD TEST: CPT

## 2024-01-10 PROCEDURE — 700111 HCHG RX REV CODE 636 W/ 250 OVERRIDE (IP): Performed by: STUDENT IN AN ORGANIZED HEALTH CARE EDUCATION/TRAINING PROGRAM

## 2024-01-10 PROCEDURE — 93306 TTE W/DOPPLER COMPLETE: CPT

## 2024-01-10 RX ORDER — CARVEDILOL 3.12 MG/1
3.12 TABLET ORAL 2 TIMES DAILY WITH MEALS
Status: DISCONTINUED | OUTPATIENT
Start: 2024-01-10 | End: 2024-01-13 | Stop reason: HOSPADM

## 2024-01-10 RX ORDER — FUROSEMIDE 40 MG/1
40 TABLET ORAL
Status: DISCONTINUED | OUTPATIENT
Start: 2024-01-10 | End: 2024-01-13 | Stop reason: HOSPADM

## 2024-01-10 RX ORDER — POTASSIUM CHLORIDE 7.45 MG/ML
10 INJECTION INTRAVENOUS
Status: COMPLETED | OUTPATIENT
Start: 2024-01-10 | End: 2024-01-10

## 2024-01-10 RX ADMIN — POTASSIUM CHLORIDE 10 MEQ: 7.46 INJECTION, SOLUTION INTRAVENOUS at 08:33

## 2024-01-10 RX ADMIN — HEPARIN SODIUM 5000 UNITS: 5000 INJECTION, SOLUTION INTRAVENOUS; SUBCUTANEOUS at 06:31

## 2024-01-10 RX ADMIN — INSULIN HUMAN 1 UNITS: 100 INJECTION, SOLUTION PARENTERAL at 12:43

## 2024-01-10 RX ADMIN — CHOLESTYRAMINE 4 G: 4 POWDER, FOR SUSPENSION ORAL at 17:45

## 2024-01-10 RX ADMIN — OMEPRAZOLE 20 MG: 20 CAPSULE, DELAYED RELEASE ORAL at 06:31

## 2024-01-10 RX ADMIN — FUROSEMIDE 40 MG: 40 TABLET ORAL at 08:29

## 2024-01-10 RX ADMIN — INSULIN HUMAN 1 UNITS: 100 INJECTION, SOLUTION PARENTERAL at 16:37

## 2024-01-10 RX ADMIN — FUROSEMIDE 40 MG: 40 TABLET ORAL at 15:39

## 2024-01-10 RX ADMIN — ASPIRIN 81 MG: 81 TABLET, COATED ORAL at 06:31

## 2024-01-10 RX ADMIN — ATORVASTATIN CALCIUM 80 MG: 80 TABLET, FILM COATED ORAL at 20:39

## 2024-01-10 RX ADMIN — HEPARIN SODIUM 5000 UNITS: 5000 INJECTION, SOLUTION INTRAVENOUS; SUBCUTANEOUS at 21:05

## 2024-01-10 RX ADMIN — POTASSIUM CHLORIDE 10 MEQ: 7.46 INJECTION, SOLUTION INTRAVENOUS at 12:22

## 2024-01-10 RX ADMIN — CARVEDILOL 3.12 MG: 3.12 TABLET, FILM COATED ORAL at 20:39

## 2024-01-10 RX ADMIN — CHOLESTYRAMINE 4 G: 4 POWDER, FOR SUSPENSION ORAL at 10:04

## 2024-01-10 RX ADMIN — HEPARIN SODIUM 5000 UNITS: 5000 INJECTION, SOLUTION INTRAVENOUS; SUBCUTANEOUS at 15:39

## 2024-01-10 RX ADMIN — POTASSIUM CHLORIDE 10 MEQ: 7.46 INJECTION, SOLUTION INTRAVENOUS at 09:58

## 2024-01-10 ASSESSMENT — LIFESTYLE VARIABLES
TOTAL SCORE: 0
AVERAGE NUMBER OF DAYS PER WEEK YOU HAVE A DRINK CONTAINING ALCOHOL: 0
ALCOHOL_USE: NO
ON A TYPICAL DAY WHEN YOU DRINK ALCOHOL HOW MANY DRINKS DO YOU HAVE: 0
EVER FELT BAD OR GUILTY ABOUT YOUR DRINKING: NO
TOTAL SCORE: 0
TOTAL SCORE: 0
HOW MANY TIMES IN THE PAST YEAR HAVE YOU HAD 5 OR MORE DRINKS IN A DAY: 0
HAVE PEOPLE ANNOYED YOU BY CRITICIZING YOUR DRINKING: NO
CONSUMPTION TOTAL: NEGATIVE
DOES PATIENT WANT TO STOP DRINKING: NO
HAVE YOU EVER FELT YOU SHOULD CUT DOWN ON YOUR DRINKING: NO
EVER HAD A DRINK FIRST THING IN THE MORNING TO STEADY YOUR NERVES TO GET RID OF A HANGOVER: NO

## 2024-01-10 ASSESSMENT — ENCOUNTER SYMPTOMS
HEADACHES: 0
DOUBLE VISION: 0
WEAKNESS: 1
COUGH: 0
VOMITING: 0
DIZZINESS: 1
SHORTNESS OF BREATH: 0
MYALGIAS: 0
CHILLS: 0
BLURRED VISION: 0
HEARTBURN: 0
SORE THROAT: 0
FOCAL WEAKNESS: 0
PALPITATIONS: 0
FEVER: 0
NECK PAIN: 0
DEPRESSION: 0
ABDOMINAL PAIN: 0
SPUTUM PRODUCTION: 0
NAUSEA: 0
ORTHOPNEA: 0

## 2024-01-10 ASSESSMENT — PATIENT HEALTH QUESTIONNAIRE - PHQ9
SUM OF ALL RESPONSES TO PHQ9 QUESTIONS 1 AND 2: 0
2. FEELING DOWN, DEPRESSED, IRRITABLE, OR HOPELESS: NOT AT ALL
2. FEELING DOWN, DEPRESSED, IRRITABLE, OR HOPELESS: NOT AT ALL
1. LITTLE INTEREST OR PLEASURE IN DOING THINGS: NOT AT ALL
SUM OF ALL RESPONSES TO PHQ9 QUESTIONS 1 AND 2: 0
1. LITTLE INTEREST OR PLEASURE IN DOING THINGS: NOT AT ALL

## 2024-01-10 ASSESSMENT — PAIN DESCRIPTION - PAIN TYPE
TYPE: ACUTE PAIN
TYPE: ACUTE PAIN

## 2024-01-10 ASSESSMENT — FIBROSIS 4 INDEX: FIB4 SCORE: 1.9

## 2024-01-10 NOTE — ASSESSMENT & PLAN NOTE
Patient with known history of CKD, previously followed by nephrology.  Last seen May 2023.  Now presents with worsening creatinine and GFR.  Etiology of his CKD is from diabetic nephropathy and chronic TMA  Patient presents with creatinine level of 4.55  Patient states that he is still making urine  Follow-up renal ultrasound and urinalysis    Nephrology consulted to help guide diuretics management    1/12: Cr has been stabilizing around 3.9

## 2024-01-10 NOTE — PROGRESS NOTES
2 RN Skin Assessment Completed by , Zamzam RN and Queenie RN.     Head: WDL  Ears: WDL  Nose: WDL  Mouth: WDL  Neck: WDL  Breasts/Chest: WDL  Shoulder Blades: WDL  Spine: WDL  (R) Arm/Elbow/hand: Abrasion, see media   (L) Arm/Elbow/hand: WDL  Abdomen:WDL  Groin: WDL  Sacrum/Coccyx/Buttocks: blanching  (R) Leg: WDL  (L) Leg: WDL  (R) Heel/Foot/Toe: post toe amputation  (L) Heel/Foot/Toe: Wounds, see media        Devices in place: BP Cuff and Pulse Ox     Interventions in place: Gray ear foams and Pillows     Possible skin injury found: No     Pictures uploaded into Epic: N/A  Wound Consult Placed: N/A

## 2024-01-10 NOTE — ED TRIAGE NOTES
Chief Complaint   Patient presents with    Syncope     PT reports he has had multiple episodes of syncope since yesterday evening. PT checked his BP at home and has noted systolic has been consistently in the 80s. PT denies trauma, some contusions noted on arms, PT denies head strike or any discomfort. PT denies CP. FSBS 147 per EMS.    Hypotension     PT brought to triage by EMS, noted to be hypotensive, Charge RN contacted and PT roomed immediately. .    BP (!) 83/57   Pulse 70   Temp 35.9 °C (96.6 °F) (Temporal)   Resp 16   Ht 1.829 m (6')   Wt 88.5 kg (195 lb)   SpO2 98%   BMI 26.45 kg/m²

## 2024-01-10 NOTE — ED NOTES
Report given to Shalini HENAO. Safety measures in place. Call light within reach. Care relinquished.

## 2024-01-10 NOTE — ED NOTES
Patient remains comfortable on gurney. Equal chest rise and fall bilaterally, pt connected to cardiac monitor. Pending admission. Safety measures in place, call light within reach. Pt provided with diet coke, water, and emptied urinal.

## 2024-01-10 NOTE — H&P
Hospital Medicine History & Physical Note    Date of Service  1/9/2024    Primary Care Physician  Florinda Pascual M.D.      Code Status  Full Code    Chief Complaint  Chief Complaint   Patient presents with    Syncope     PT reports he has had multiple episodes of syncope since yesterday evening. PT checked his BP at home and has noted systolic has been consistently in the 80s. PT denies trauma, some contusions noted on arms, PT denies head strike or any discomfort. PT denies CP. FSBS 147 per EMS.    Hypotension       History of Presenting Illness  Beni Moore is a 53 y.o. 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  Patient states that over the last 2 to 3 days, he has had worsening dizziness upon standing.  He has had 4-5 episodes where he syncopized and lost consciousness.  He is unsure how long he went out for.  Denies any head trauma.  Patient is currently on Lasix 80 mg twice daily, and has been on it for the last 10 months.  He states he has never had problems in the past.  In the emergency department, patient was found to be in the systolics of 80s, which improved after 500 cc bolus.  Patient also has worsening kidney function, with a creatinine level of 4.55.  He states that he has not seen his nephrologist in a while now.  Patient otherwise denies any fevers, chills, nausea, vomiting.  Patient will be admitted for management of syncope and collapse.    I discussed the plan of care with patient.    Review of Systems  Review of Systems   Constitutional:  Negative for chills and fever.   HENT:  Negative for congestion, ear discharge, ear pain, nosebleeds and sinus pain.    Eyes:  Negative for blurred vision, double vision and photophobia.   Respiratory:  Negative for cough, hemoptysis, sputum production and shortness of breath.    Cardiovascular:  Negative for chest pain, palpitations and orthopnea.    Gastrointestinal:  Negative for abdominal pain, diarrhea, nausea and vomiting.   Genitourinary:  Negative for frequency, hematuria and urgency.   Musculoskeletal:  Positive for falls. Negative for back pain and neck pain.   Neurological:  Positive for loss of consciousness. Negative for dizziness and headaches.   Psychiatric/Behavioral:  Negative for depression, hallucinations, substance abuse and suicidal ideas.        Past Medical History   has a past medical history of Anesthesia, Anesthesia (12/06/2023), Bowel habit changes, Cataract, Cataract, Deaf, right, Diabetes (HCC), Heart burn, Hemorrhagic disorder (HCC), History of non-ST elevation myocardial infarction (NSTEMI), heart artery stent (2019), Hyperlipidemia, Hyperparathyroidism due to renal insufficiency (HCC), Hypertension, Myocardial infarct (HCC), Pneumonia, and Renal disorder (12/06/2023).    Surgical History   has a past surgical history that includes other orthopedic surgery; toe amputation (Right, 07/02/2018); achilles tendon repair (Right, 07/02/2018); irrigation & debridement ortho (Right, 07/02/2018); vasectomy; tonsillectomy; other abdominal surgery; stent placement (2019); toe amputation (Left, 09/30/2021); other (Left); cataract extraction with iol (Right, 2023); pr open treatment prox humeral fracture (Left, 12/11/2023); and pr repair biceps long tendon (Left, 12/11/2023).     Family History  family history includes Cancer in his paternal grandfather and paternal grandmother; Diabetes in his father and maternal grandmother; Heart Disease in his father and maternal grandfather; Lung Cancer in his paternal grandfather; Lung Disease in his paternal grandmother; No Known Problems in his mother.   Family history reviewed with patient. There is no family history that is pertinent to the chief complaint.     Social History   reports that he quit smoking about 22 years ago. His smoking use included cigarettes. He started smoking about 32 years ago.  He has a 5.0 pack-year smoking history. He has never used smokeless tobacco. He reports that he does not currently use drugs after having used the following drugs: Marijuana and Oral. He reports that he does not drink alcohol.    Allergies  No Known Allergies    Medications  Prior to Admission Medications   Prescriptions Last Dose Informant Patient Reported? Taking?   HUMULIN R 100 UNIT/ML Solution 2024 at PM Patient Yes No   Sig: Inject 4-8 Units under the skin 3 times a day before meals. Sliding Scale   150 - 200 = 3 units  201 - 250 = 4 units  251 - 300 = 5 units  301 - 350 = 6 units  351 - 400 = 7 units   PLUS CARB CORRECTION  of 1 unit for every 15 g carb   HYDROcodone-acetaminophen (NORCO) 5-325 MG Tab per tablet 2-3 WEEKS AGO at PRN Patient Yes Yes   Sig: Take 1 Tablet by mouth every four hours as needed (severe pain).   Insulin Degludec (TRESIBA FLEXTOUCH) 100 UNIT/ML Solution Pen-injector 2024 at PM Patient Yes No   Sig: Inject 36 Units under the skin every evening. 36 units at night   Ranibizumab (LUCENTIS) 0.3 MG/0.05ML Solution SEVERAL WEEKS AGO at K Patient Yes No   Si Dose by Intravitreal route see administration instructions. Every 6-8 weeks   aspirin EC (ECOTRIN) 81 MG Tablet Delayed Response 2024 at AM Patient No No   Sig: Take 1 Tablet by mouth every day.   atorvastatin (LIPITOR) 80 MG tablet 2024 at PM Patient No No   Sig: Take 1 Tablet by mouth every evening.   carvedilol (COREG) 3.125 MG Tab 2024 at AM Patient No No   Sig: Take 1 Tablet by mouth 2 times a day with meals.   cholestyramine (QUESTRAN) 4 g packet 2024 at 1000 Patient Yes No   Sig: Take 1 Packet by mouth 2 times a day. 1000 + 1700   enalapril (VASOTEC) 20 MG tablet 2024 at PM Patient No No   Sig: TAKE ONE-HALF (1/2) TABLET TWICE A DAY   furosemide (LASIX) 80 MG Tab 2024 at AM Patient No No   Sig: Take 2 Tablets by mouth 2 times a day.   omeprazole (PRILOSEC) 20 MG delayed-release capsule 2024  at AM Patient Yes No   Sig: Take 1 Capsule by mouth every morning.      Facility-Administered Medications: None       Physical Exam  Temp:  [35.9 °C (96.6 °F)] 35.9 °C (96.6 °F)  Pulse:  [63-72] 63  Resp:  [10-19] 13  BP: ()/(40-70) 134/70  SpO2:  [94 %-99 %] 98 %  Blood Pressure: 134/70   Temperature: 35.9 °C (96.6 °F)   Pulse: 63   Respiration: 13   Pulse Oximetry: 98 %       Physical Exam  Constitutional:       General: He is not in acute distress.     Appearance: Normal appearance. He is normal weight. He is not ill-appearing, toxic-appearing or diaphoretic.   HENT:      Head: Normocephalic and atraumatic.      Mouth/Throat:      Mouth: Mucous membranes are moist.   Eyes:      Pupils: Pupils are equal, round, and reactive to light.   Cardiovascular:      Rate and Rhythm: Normal rate and regular rhythm.      Pulses: Normal pulses.      Heart sounds: Normal heart sounds. No murmur heard.     No friction rub. No gallop.   Pulmonary:      Effort: Pulmonary effort is normal. No respiratory distress.      Breath sounds: Normal breath sounds. No stridor. No wheezing, rhonchi or rales.   Chest:      Chest wall: No tenderness.   Abdominal:      General: There is no distension.      Palpations: There is no mass.      Tenderness: There is no abdominal tenderness. There is no right CVA tenderness, left CVA tenderness, guarding or rebound.      Hernia: No hernia is present.   Musculoskeletal:         General: No swelling, tenderness, deformity or signs of injury.      Right lower leg: No edema.      Left lower leg: No edema.   Skin:     General: Skin is warm and dry.      Capillary Refill: Capillary refill takes less than 2 seconds.      Coloration: Skin is not jaundiced or pale.      Findings: No bruising, erythema, lesion or rash.   Neurological:      General: No focal deficit present.      Mental Status: He is alert and oriented to person, place, and time. Mental status is at baseline.      Cranial Nerves: No  cranial nerve deficit.      Sensory: No sensory deficit.      Motor: No weakness.      Coordination: Coordination normal.   Psychiatric:         Mood and Affect: Mood normal.         Laboratory:  Recent Labs     01/09/24 1917   WBC 5.6   RBC 3.53*   HEMOGLOBIN 10.4*   HEMATOCRIT 31.4*   MCV 89.0   MCH 29.5   MCHC 33.1   RDW 43.4   PLATELETCT 216   MPV 10.4     Recent Labs     01/09/24 1917   SODIUM 133*   POTASSIUM 3.7   CHLORIDE 100   CO2 17*   GLUCOSE 152*   BUN 81*   CREATININE 4.55*   CALCIUM 8.0*     Recent Labs     01/09/24 1917   ALTSGPT 35   ASTSGOT 42   ALKPHOSPHAT 84   TBILIRUBIN 0.7   GLUCOSE 152*         Recent Labs     01/09/24 1917   NTPROBNP 606*         Recent Labs     01/09/24 1917   TROPONINT 104*       Imaging:  DX-CHEST-PORTABLE (1 VIEW)   Final Result         1.  No acute cardiopulmonary disease.      EC-ECHOCARDIOGRAM COMPLETE W/O CONT    (Results Pending)   US-RENAL    (Results Pending)       X-Ray:  I have personally reviewed the images and compared with prior images.  EKG:  I have personally reviewed the images and compared with prior images.    Assessment/Plan:  Justification for Admission Status  I anticipate this patient will require at least two midnights for appropriate medical management, necessitating inpatient admission because syncope and collapse, hypotension, AXEL and CKD    Patient will need a Telemetry bed on MEDICAL service .  The need is secondary to syncope and collapse, hypotension, AXEL on CKD.    * Syncope and collapse- (present on admission)  Assessment & Plan  Patient states that she has had 2 days of worsening dizziness upon standing.  He has fainted approxi-4-5 times.  He was hypotensive in the emergency department systolic blood pressure in the 80s.  Was given a 500 cc bolus with improvement of his blood pressure  Patient is on Lasix 80 mg twice daily  Follow-up echocardiogram  Follow-up orthostatic vital signs  Gentle IV fluids    CKD (chronic kidney  disease)  Assessment & Plan  Patient with known history of CKD, previously followed by nephrology.  Last seen May 2023.  Now presents with worsening creatinine and GFR.  Etiology of his CKD is from diabetic nephropathy and chronic TMA  Patient presents with creatinine level of 4.55  Patient states that he is still making urine  Follow-up renal ultrasound and urinalysis  Patient will need nephrology consult in the morning    Type 2 diabetes mellitus with kidney complication, with long-term current use of insulin (HCC)- (present on admission)  Assessment & Plan  Insulin sliding scale  Hypoglycemia protocol  Diabetic diet    Coronary artery disease involving native coronary artery of native heart without angina pectoris- (present on admission)  Assessment & Plan  Patient with history of CAD status post stent placement  Continue with aspirin  Continue with atorvastatin  Given his hypotension, holding off on his home dose Coreg        VTE prophylaxis: heparin ppx

## 2024-01-10 NOTE — PROGRESS NOTES
Moved to room. Diet order modified for diabetic/renal diet. Given one unit of insulin for glu of 190. Was lightheaded when transferring from [facility] to bed. Dr. Ventura updated when he rounded.

## 2024-01-10 NOTE — ASSESSMENT & PLAN NOTE
Likely vasovagal  Patient states that she has had 2 days of worsening dizziness upon standing.  He has fainted approxi-4-5 times.  He was hypotensive in the emergency department systolic blood pressure in the 80s.  Was given a 500 cc bolus with improvement of his blood pressure  Patient was  on Lasix 160 mg twice daily  Echo reviewed: normal LVEF, no wall motion abnormalities and no significant valvular diseases.     Nephrology Dr. Najjar consulted to help guide diuretics management.     1/12: Initial plan was to DC home with a close outpatient follow up.   Orthostatic BP was repeated - upon standing, BP dropped to 67/46 with dizziness. Pt reported feeling worse today.  At this point, NS 500cc bolus ordered and planned for 100cc maintenance bolus for 24hrs.

## 2024-01-10 NOTE — ED NOTES
Chasidy from Lab called with critical result of trponin of 104 at 2002. Critical lab result read back to Chasidy.   Dr. Shelby notified of critical lab result at 2003 thur Voalte.

## 2024-01-10 NOTE — ED PROVIDER NOTES
ED Provider Note    CHIEF COMPLAINT  Chief Complaint   Patient presents with    Syncope     PT reports he has had multiple episodes of syncope since yesterday evening. PT checked his BP at home and has noted systolic has been consistently in the 80s. PT denies trauma, some contusions noted on arms, PT denies head strike or any discomfort. PT denies CP. FSBS 147 per EMS.    Hypotension     EXTERNAL RECORDS REVIEWED  The patient was last seen by the [facility] for a humerus fracture in December of 2023. The patient was last seen in the ER in the beginning of December for a fall. He was last seen by cardiology in July of 2023. He has history of CVD and CKD. His last echo was done in November 2022 with a normal EF. He was last seen by nephrology in May of 2023.     HPI/ROS  LIMITATION TO HISTORY   Select: : None  OUTSIDE HISTORIAN(S):  None.    Beni Moore is a 53 y.o. male who presents to the Emergency Department via EMS for evaluation of syncope and hypotension. The patient reports that he has had multiple episodes of syncope since yesterday evening.  He describes that he believes his first syncopal episode was in the bathroom, but he is unsure, as he is unable to remember getting out of bed, but woke up on the bathroom floor. He reports that this has happened 3 more times since then. The patient states that these episodes have all happened when he is walking to the bathroom, as he getting dizzy suddenly and then has a syncopal episode. The patient denies any abdominal pain, headache, chest pain, or shortness of breath.  The patient states that he stopped taking blood pressure medication this morning. He checked his blood pressure at 1 PM today and notes that it was 80/57 while sitting. The patient has a stent in his heart. The patient is on furosemide 80 mg twice daily. He notes that he did take this today.     PAST MEDICAL HISTORY  Past Medical History:   Diagnosis Date    Anesthesia     Hypotension post-op, persisted  for a few months    Anesthesia 12/06/2023    Patient stated after 2016 colonoscopy he became hypotensive.    Bowel habit changes     History GI problems    Cataract     IOL - Left eye    Cataract     IOL OD    Deaf, right     Diabetes (HCC)     Oral medications & insulin    Heart burn     GERD; takes Prilosec    Hemorrhagic disorder (HCC)     ASA protection for stent and cardiac history    History of non-ST elevation myocardial infarction (NSTEMI)     Per Problem List    Hx of heart artery stent 2019    Hyperlipidemia     Hyperparathyroidism due to renal insufficiency (HCC)     Per Problem List    Hypertension     Myocardial infarct (HCC)     Pneumonia     H/O    Renal disorder 12/06/2023    Stage 3 CKD        SURGICAL HISTORY  Past Surgical History:   Procedure Laterality Date    PB OPEN TREATMENT PBOX HUMERAL FRACTURE Left 12/11/2023    Procedure: LEFT OPEN REDUCTION INTERNAL FIXATION  HUMERUS, PROXIMAL AND LEFT BICEPS TENODESIS;  Surgeon: Aris Pierre M.D.;  Location: [facility];  Service: Orthopedics    PB REPAIR BICEPS LONG TENDON Left 12/11/2023    Procedure: TENODESIS, BICEPS, OPEN;  Surgeon: Aris Pierre M.D.;  Location: [facility];  Service: Orthopedics    CATARACT EXTRACTION WITH IOL Right 2023    TOE AMPUTATION Left 09/30/2021    Procedure: AMPUTATION, TOE;  Surgeon: Tomer Hart M.D.;  Location: [facility];  Service: Orthopedics    STENT PLACEMENT  2019    STEMI with nonobstructive LAD    TOE AMPUTATION Right 07/02/2018    Procedure: Transmetatarsal amputation;  Surgeon: Ravi Bernardo M.D.;  Location: [facility];  Service: Orthopedics    ACHILLES TENDON REPAIR Right 07/02/2018    Procedure: ACHILLES LENGTHENING;  Surgeon: Ravi Bernardo M.D.;  Location: [facility];  Service: Orthopedics    IRRIGATION & DEBRIDEMENT ORTHO Right 07/02/2018    Procedure: IRRIGATION & DEBRIDEMENT ORTHO;  Surgeon: Ravi Bernardo M.D.;  Location:  [facility];  Service: Orthopedics    OTHER Left     IOL    OTHER ABDOMINAL SURGERY      OTHER ORTHOPEDIC SURGERY      TONSILLECTOMY      VASECTOMY          FAMILY HISTORY  Family History   Problem Relation Age of Onset    No Known Problems Mother     Diabetes Father     Heart Disease Father     Diabetes Maternal Grandmother     Heart Disease Maternal Grandfather     Lung Disease Paternal Grandmother     Cancer Paternal Grandmother     Cancer Paternal Grandfather     Lung Cancer Paternal Grandfather     Kidney Disease Neg Hx        SOCIAL HISTORY   reports that he quit smoking about 22 years ago. His smoking use included cigarettes. He started smoking about 32 years ago. He has a 5.0 pack-year smoking history. He has never used smokeless tobacco. He reports that he does not currently use drugs after having used the following drugs: Marijuana and Oral. He reports that he does not drink alcohol.      CURRENT MEDICATIONS  Previous Medications    ASPIRIN EC (ECOTRIN) 81 MG TABLET DELAYED RESPONSE    Take 1 Tablet by mouth every day.    ATORVASTATIN (LIPITOR) 80 MG TABLET    Take 1 Tablet by mouth every evening.    CARVEDILOL (COREG) 3.125 MG TAB    Take 1 Tablet by mouth 2 times a day with meals.    CHOLESTYRAMINE (QUESTRAN) 4 G PACKET    Take 1 Packet by mouth 2 times a day. 1000 + 1700    ENALAPRIL (VASOTEC) 20 MG TABLET    TAKE ONE-HALF (1/2) TABLET TWICE A DAY    FUROSEMIDE (LASIX) 80 MG TAB    Take 2 Tablets by mouth 2 times a day.    HUMULIN R 100 UNIT/ML SOLUTION    Inject 4-8 Units under the skin 3 times a day before meals. Sliding Scale   150 - 200 = 3 units  201 - 250 = 4 units  251 - 300 = 5 units  301 - 350 = 6 units  351 - 400 = 7 units   PLUS CARB CORRECTION  of 1 unit for every 15 g carb    HYDROCODONE-ACETAMINOPHEN (NORCO) 5-325 MG TAB PER TABLET    Take 1 Tablet by mouth every four hours as needed (severe pain).    INSULIN DEGLUDEC (TRESIBA FLEXTOUCH) 100 UNIT/ML SOLUTION PEN-INJECTOR     Inject 36 Units under the skin every evening. 36 units at night    OMEPRAZOLE (PRILOSEC) 20 MG DELAYED-RELEASE CAPSULE    Take 1 Capsule by mouth every morning.    RANIBIZUMAB (LUCENTIS) 0.3 MG/0.05ML SOLUTION    1 Dose by Intravitreal route see administration instructions. Every 6-8 weeks       ALLERGIES  Patient has no known allergies.      PHYSICAL EXAM  BP (!) 59/40   Pulse 72   Temp 35.9 °C (96.6 °F) (Temporal)   Resp 16   Ht 1.829 m (6')   Wt 88.5 kg (195 lb)   SpO2 97%      Constitutional: Nontoxic appearing. Alert in no apparent distress.  HENT: Normocephalic, Atraumatic. Bilateral external ears normal. Nose normal.  Moist mucous membranes.  Oropharynx clear.  Eyes: Pupils are equal and reactive. Conjunctiva normal.   Neck: Supple, full range of motion  Heart: Regular rate and rhythm.  No murmurs.    Lungs: No respiratory distress, normal work of breathing. Lungs clear to auscultation bilaterally.  Abdomen Soft, no distention.  No tenderness to palpation.  Musculoskeletal: Atraumatic. No obvious deformities noted.  No lower extremity edema.  Skin: Warm, Dry.  No erythema, No rash. Pale.  Neurologic: Alert and oriented x3. Moving all extremities spontaneously without focal deficits.  Psychiatric: Affect normal, Mood normal, Appears appropriate and not intoxicated.       DIAGNOSTIC STUDIES / PROCEDURES    EKG  I have independently interpreted this EKG  Results for orders placed or performed during the hospital encounter of 24   EKG (NOW)   Result Value Ref Range    Report       [facility] Emergency Dept.    Test Date:  2024  Pt Name:    HELIO BARROW                     Department: ER  MRN:        [id number]                      Room:  Gender:     Male                         Technician: [id number]  :        1970                   Requested By:ER TRIAGE PROTOCOL  Order #:    [id number]                    Reading MD: Elda Shelby MD    Measurements  Intervals                                 Axis  Rate:       66                           P:          63  WV:         137                          QRS:        105  QRSD:       117                          T:          -14  QT:         431  QTc:        452    Interpretive Statements  Sinus rhythm  Nonspecific intraventricular conduction delay  Inferior infarct, age indeterminate  No acute ST or T wave  Compared to ECG 12/07/2023 11:26:35  No significant change from prior  Electronically Signed On 01- 20:09:44 PST by Elda Shelby MD          LABS  Labs Reviewed   CBC WITH DIFFERENTIAL - Abnormal; Notable for the following components:       Result Value    RBC 3.53 (*)     Hemoglobin 10.4 (*)     Hematocrit 31.4 (*)     Lymphocytes 44.40 (*)     All other components within normal limits    Narrative:     Biotin intake of greater than 5 mg per day may interfere with  troponin levels, causing false low values.   COMP METABOLIC PANEL - Abnormal; Notable for the following components:    Sodium 133 (*)     Co2 17 (*)     Glucose 152 (*)     Bun 81 (*)     Creatinine 4.55 (*)     Calcium 8.0 (*)     Albumin 3.0 (*)     All other components within normal limits    Narrative:     Biotin intake of greater than 5 mg per day may interfere with  troponin levels, causing false low values.   TROPONIN - Abnormal; Notable for the following components:    Troponin T 104 (*)     All other components within normal limits    Narrative:     Biotin intake of greater than 5 mg per day may interfere with  troponin levels, causing false low values.   TROPONIN - Abnormal; Notable for the following components:    Troponin T 94 (*)     All other components within normal limits    Narrative:     Biotin intake of greater than 5 mg per day may interfere with  troponin levels, causing false low values.   PROBRAIN NATRIURETIC PEPTIDE, NT - Abnormal; Notable for the following components:    NT-proBNP 606 (*)     All other components within normal limits    Narrative:      Biotin intake of greater than 5 mg per day may interfere with  troponin levels, causing false low values.   ESTIMATED GFR - Abnormal; Notable for the following components:    GFR (CKD-EPI) 14 (*)     All other components within normal limits    Narrative:     Biotin intake of greater than 5 mg per day may interfere with  troponin levels, causing false low values.   HEMOGLOBIN A1C - Abnormal; Notable for the following components:    Glycohemoglobin 7.2 (*)     All other components within normal limits   URINALYSIS - Abnormal; Notable for the following components:    Glucose 100 (*)     Protein 300 (*)     Occult Blood Small (*)     All other components within normal limits   MAGNESIUM - Abnormal; Notable for the following components:    Magnesium 1.4 (*)     All other components within normal limits    Narrative:     Biotin intake of greater than 5 mg per day may interfere with  troponin levels, causing false low values.   LIPID PROFILE - Abnormal; Notable for the following components:    HDL 36 (*)     All other components within normal limits    Narrative:     Biotin intake of greater than 5 mg per day may interfere with  troponin levels, causing false low values.   PHOSPHORUS - Abnormal; Notable for the following components:    Phosphorus 6.4 (*)     All other components within normal limits    Narrative:     Biotin intake of greater than 5 mg per day may interfere with  troponin levels, causing false low values.   URINE MICROSCOPIC (W/UA) - Abnormal; Notable for the following components:    WBC 0-2 (*)     RBC 0-2 (*)     All other components within normal limits   LACTIC ACID   CBC WITHOUT DIFFERENTIAL   COMP METABOLIC PANEL        RADIOLOGY  I have independently interpreted the diagnostic imaging associated with this visit and am waiting the final reading from the radiologist.   My preliminary interpretation is a follows: no infiltrate or pulmonary edema  Radiologist interpretation:   US-RENAL   Final Result          1.  Normal renal ultrasound.      DX-CHEST-PORTABLE (1 VIEW)   Final Result         1.  No acute cardiopulmonary disease.      EC-ECHOCARDIOGRAM COMPLETE W/O CONT    (Results Pending)        COURSE & MEDICAL DECISION MAKING  7:17 PM - Patient seen and examined at bedside. Discussed plan of care, including performing an EKG, lab work and imaging. Patient agrees to the plan of care. The patient will be medicated with LR bolus. Ordered for an EKG, Troponins, CMP, CBC w/ Diff., Lactic Acid, proBNP, and DX-Chest to evaluate his symptoms.      ED Observation Status? No; Patient does not meet criteria for ED Observation.     INITIAL ASSESSMENT, COURSE AND PLAN  Care Narrative: Patient with history of underlying heart failure and chronic kidney disease with who presents following multiple syncopal episodes over the last 2 days.  He was initially quite hypotensive with otherwise normal vital signs.  He has not had a fever or any infectious symptoms.  Lactate is normal without concern for sepsis.  EKG does not show evidence of ischemia or dysrhythmia.  Labs returned with an elevated troponin however patient has had similar in the past.  No symptoms to suggest ACS or pulmonary embolism or subarachnoid hemorrhage.  Bedside ultrasound was performed without evidence of ruptured AAA. His labs otherwise show worsening renal function from his baseline.  Possibly due to overdiuresis.   Patient will be given gentle fluids and admitted.    8:16 PM - The patient was reevaluated at bedside.  Following 500 cc of fluids, blood pressure is significantly improved.  I informed the patient that he will require admission. He is understanding and agreeable to the plan of care.       ADDITIONAL PROBLEM LIST  Problem #1: Acute kidney injury -given small fluid bolus, will admit for renal ultrasound and further fluids    Problem #2: Syncope -likely due to to above, cardiac workup reassuring    Problem #3: Hypotension -resolved with  fluids    DISPOSITION AND DISCUSSIONS  I have discussed management of the patient with the following physicians and CHRISTIANNE's:      8:48 PM - I discussed the patient's case and the above findings with Dr. Michele who will admit the patient.     CRITICAL CARE  The very real possibilty of a deterioration of this patient's condition required the highest level of my preparedness for sudden, emergent intervention.  I provided critical care services, which included medication orders, frequent reevaluations of the patient's condition and response to treatment, ordering and reviewing test results, and discussing the case with various consultants.  The critical care time associated with the care of the patient was 36 minutes. Review chart for interventions. This time is exclusive of any other billable procedures.       DISPOSITION:  Patient will be hospitalized by Dr. Michele in guarded condition.     FINAL DIAGNOSIS  1. AXEL (acute kidney injury) (HCC)    2. Syncope, unspecified syncope type    3. Hypotension, unspecified hypotension type      The note accurately reflects work and decisions made by me.  Elda Shelby M.D.  1/9/2024  11:32 PM     Lupe MCCARTHY (Jordy), am scribing for, and in the presence of, Elda Shelby M.D..    Electronically signed by: Lupe Fine (Jordy), 1/9/2024    Elda MCCARTHY M.D. personally performed the services described in this documentation, as scribed by Lupe Fine in my presence, and it is both accurate and complete.

## 2024-01-10 NOTE — ED NOTES
Med Rec complete per patient   Allergies reviewed  Antibiotics in the past 30 days:no  Anticoagulant in past 14 days:no  Pharmacy patient utilizes:[facility] [place]    Pt states he is due in a couple weeks for the Lucentis at eye doctor office

## 2024-01-10 NOTE — ASSESSMENT & PLAN NOTE
Patient with history of CAD status post stent placement  Continue with aspirin  Continue with atorvastatin  C/w Coreg (with holding parameters)

## 2024-01-10 NOTE — ED NOTES
.Bedside report received from off going RN/tech: Torri RN, assumed care of patient.  POC discussed with patient. Call light within reach, all needs addressed at this time.  Waiting on room assignment      Fall risk interventions in place: Move the patient closer to the nurse's station, Patient's personal possessions are with in their safe reach, Place socks on patient, Place fall risk sign on patient's door, Give patient urinal if applicable, Keep floor surfaces clean and dry, and Accompanied to restroom (all applicable per [place] Fall risk assessment)   Continuous monitoring: Cardiac Leads, Pulse Ox, or Blood Pressure  IVF/IV medications: Infusion per MAR (List Med(s)) LR  Oxygen: Room Air  Bedside sitter: Not Applicable   Isolation: Not Applicable

## 2024-01-10 NOTE — PROGRESS NOTES
Transferred to CDU. In hallway bed until room opens. Denies CP, SOB etc. K rider infusing. Admission begun.

## 2024-01-10 NOTE — ED NOTES
Patient remains comfortable on gurney, no identifiable needs at this time. Equal chest rise and fall bilaterally, pt connected to cardiac monitor. Pending admission/ECHO. Safety measures in place, call light within reach. Pt medicated with AM medications.

## 2024-01-11 LAB
ANION GAP SERPL CALC-SCNC: 13 MMOL/L (ref 7–16)
ANION GAP SERPL CALC-SCNC: 14 MMOL/L (ref 7–16)
BUN SERPL-MCNC: 71 MG/DL (ref 8–22)
BUN SERPL-MCNC: 75 MG/DL (ref 8–22)
CALCIUM SERPL-MCNC: 6.8 MG/DL (ref 8.5–10.5)
CALCIUM SERPL-MCNC: 7.1 MG/DL (ref 8.5–10.5)
CHLORIDE SERPL-SCNC: 105 MMOL/L (ref 96–112)
CHLORIDE SERPL-SCNC: 105 MMOL/L (ref 96–112)
CO2 SERPL-SCNC: 15 MMOL/L (ref 20–33)
CO2 SERPL-SCNC: 15 MMOL/L (ref 20–33)
CREAT SERPL-MCNC: 3.95 MG/DL (ref 0.5–1.4)
CREAT SERPL-MCNC: 4.21 MG/DL (ref 0.5–1.4)
FLUAV RNA SPEC QL NAA+PROBE: POSITIVE
FLUBV RNA SPEC QL NAA+PROBE: NEGATIVE
GFR SERPLBLD CREATININE-BSD FMLA CKD-EPI: 16 ML/MIN/1.73 M 2
GFR SERPLBLD CREATININE-BSD FMLA CKD-EPI: 17 ML/MIN/1.73 M 2
GLUCOSE BLD STRIP.AUTO-MCNC: 101 MG/DL (ref 65–99)
GLUCOSE BLD STRIP.AUTO-MCNC: 122 MG/DL (ref 65–99)
GLUCOSE BLD STRIP.AUTO-MCNC: 134 MG/DL (ref 65–99)
GLUCOSE BLD STRIP.AUTO-MCNC: 152 MG/DL (ref 65–99)
GLUCOSE SERPL-MCNC: 106 MG/DL (ref 65–99)
GLUCOSE SERPL-MCNC: 195 MG/DL (ref 65–99)
POTASSIUM SERPL-SCNC: 3 MMOL/L (ref 3.6–5.5)
POTASSIUM SERPL-SCNC: 3.8 MMOL/L (ref 3.6–5.5)
RSV RNA SPEC QL NAA+PROBE: NEGATIVE
SARS-COV-2 RNA RESP QL NAA+PROBE: NOTDETECTED
SODIUM SERPL-SCNC: 133 MMOL/L (ref 135–145)
SODIUM SERPL-SCNC: 134 MMOL/L (ref 135–145)
SPECIMEN SOURCE: ABNORMAL

## 2024-01-11 PROCEDURE — 96372 THER/PROPH/DIAG INJ SC/IM: CPT

## 2024-01-11 PROCEDURE — 700102 HCHG RX REV CODE 250 W/ 637 OVERRIDE(OP): Performed by: HOSPITALIST

## 2024-01-11 PROCEDURE — A9270 NON-COVERED ITEM OR SERVICE: HCPCS | Performed by: HOSPITALIST

## 2024-01-11 PROCEDURE — 0241U HCHG SARS-COV-2 COVID-19 NFCT DS RESP RNA 4 TRGT MIC: CPT

## 2024-01-11 PROCEDURE — A9270 NON-COVERED ITEM OR SERVICE: HCPCS | Performed by: STUDENT IN AN ORGANIZED HEALTH CARE EDUCATION/TRAINING PROGRAM

## 2024-01-11 PROCEDURE — 700111 HCHG RX REV CODE 636 W/ 250 OVERRIDE (IP): Performed by: STUDENT IN AN ORGANIZED HEALTH CARE EDUCATION/TRAINING PROGRAM

## 2024-01-11 PROCEDURE — 700102 HCHG RX REV CODE 250 W/ 637 OVERRIDE(OP): Mod: JZ

## 2024-01-11 PROCEDURE — 770001 HCHG ROOM/CARE - MED/SURG/GYN PRIV*

## 2024-01-11 PROCEDURE — 700101 HCHG RX REV CODE 250: Performed by: STUDENT IN AN ORGANIZED HEALTH CARE EDUCATION/TRAINING PROGRAM

## 2024-01-11 PROCEDURE — 80048 BASIC METABOLIC PNL TOTAL CA: CPT

## 2024-01-11 PROCEDURE — A9270 NON-COVERED ITEM OR SERVICE: HCPCS | Mod: JZ

## 2024-01-11 PROCEDURE — 700102 HCHG RX REV CODE 250 W/ 637 OVERRIDE(OP): Performed by: STUDENT IN AN ORGANIZED HEALTH CARE EDUCATION/TRAINING PROGRAM

## 2024-01-11 PROCEDURE — 8E0ZXY6 ISOLATION: ICD-10-PCS | Performed by: STUDENT IN AN ORGANIZED HEALTH CARE EDUCATION/TRAINING PROGRAM

## 2024-01-11 PROCEDURE — 97602 WOUND(S) CARE NON-SELECTIVE: CPT

## 2024-01-11 PROCEDURE — 99232 SBSQ HOSP IP/OBS MODERATE 35: CPT | Performed by: STUDENT IN AN ORGANIZED HEALTH CARE EDUCATION/TRAINING PROGRAM

## 2024-01-11 PROCEDURE — 99222 1ST HOSP IP/OBS MODERATE 55: CPT | Performed by: INTERNAL MEDICINE

## 2024-01-11 PROCEDURE — 82962 GLUCOSE BLOOD TEST: CPT

## 2024-01-11 RX ORDER — BACITRACIN ZINC AND POLYMYXIN B SULFATE 500; 1000 [USP'U]/G; [USP'U]/G
OINTMENT TOPICAL 2 TIMES DAILY
Status: DISCONTINUED | OUTPATIENT
Start: 2024-01-11 | End: 2024-01-13 | Stop reason: HOSPADM

## 2024-01-11 RX ORDER — OSELTAMIVIR PHOSPHATE 30 MG/1
30 CAPSULE ORAL DAILY
Status: DISCONTINUED | OUTPATIENT
Start: 2024-01-11 | End: 2024-01-13 | Stop reason: HOSPADM

## 2024-01-11 RX ORDER — POTASSIUM CHLORIDE 20 MEQ/1
40 TABLET, EXTENDED RELEASE ORAL ONCE
Status: COMPLETED | OUTPATIENT
Start: 2024-01-11 | End: 2024-01-11

## 2024-01-11 RX ORDER — IBUPROFEN 200 MG
950 CAPSULE ORAL 2 TIMES DAILY
Status: DISCONTINUED | OUTPATIENT
Start: 2024-01-11 | End: 2024-01-13 | Stop reason: HOSPADM

## 2024-01-11 RX ORDER — OSELTAMIVIR PHOSPHATE 30 MG/1
30 CAPSULE ORAL DAILY
Status: DISCONTINUED | OUTPATIENT
Start: 2024-01-11 | End: 2024-01-11

## 2024-01-11 RX ADMIN — ACETAMINOPHEN 650 MG: 325 TABLET, FILM COATED ORAL at 05:35

## 2024-01-11 RX ADMIN — CARVEDILOL 3.12 MG: 3.12 TABLET, FILM COATED ORAL at 09:01

## 2024-01-11 RX ADMIN — FUROSEMIDE 40 MG: 40 TABLET ORAL at 05:34

## 2024-01-11 RX ADMIN — HEPARIN SODIUM 5000 UNITS: 5000 INJECTION, SOLUTION INTRAVENOUS; SUBCUTANEOUS at 05:34

## 2024-01-11 RX ADMIN — CHOLESTYRAMINE 4 G: 4 POWDER, FOR SUSPENSION ORAL at 10:30

## 2024-01-11 RX ADMIN — OMEPRAZOLE 20 MG: 20 CAPSULE, DELAYED RELEASE ORAL at 05:34

## 2024-01-11 RX ADMIN — HEPARIN SODIUM 5000 UNITS: 5000 INJECTION, SOLUTION INTRAVENOUS; SUBCUTANEOUS at 15:23

## 2024-01-11 RX ADMIN — POTASSIUM CHLORIDE 40 MEQ: 1500 TABLET, EXTENDED RELEASE ORAL at 09:00

## 2024-01-11 RX ADMIN — Medication 950 MG: at 09:00

## 2024-01-11 RX ADMIN — Medication 1 EACH: at 21:01

## 2024-01-11 RX ADMIN — CHOLESTYRAMINE 4 G: 4 POWDER, FOR SUSPENSION ORAL at 17:04

## 2024-01-11 RX ADMIN — CARVEDILOL 3.12 MG: 3.12 TABLET, FILM COATED ORAL at 17:04

## 2024-01-11 RX ADMIN — POTASSIUM CHLORIDE 40 MEQ: 1500 TABLET, EXTENDED RELEASE ORAL at 05:36

## 2024-01-11 RX ADMIN — Medication 950 MG: at 21:01

## 2024-01-11 RX ADMIN — OSELTAMIVIR PHOSPHATE 30 MG: 30 CAPSULE ORAL at 15:41

## 2024-01-11 RX ADMIN — ASPIRIN 81 MG: 81 TABLET, COATED ORAL at 05:34

## 2024-01-11 RX ADMIN — ATORVASTATIN CALCIUM 80 MG: 80 TABLET, FILM COATED ORAL at 21:01

## 2024-01-11 RX ADMIN — HEPARIN SODIUM 5000 UNITS: 5000 INJECTION, SOLUTION INTRAVENOUS; SUBCUTANEOUS at 21:04

## 2024-01-11 ASSESSMENT — ENCOUNTER SYMPTOMS
SHORTNESS OF BREATH: 0
DEPRESSION: 0
SPUTUM PRODUCTION: 0
MYALGIAS: 0
CHILLS: 0
DIZZINESS: 1
DOUBLE VISION: 0
BLURRED VISION: 0
COUGH: 0
WEAKNESS: 1
HEADACHES: 0
NECK PAIN: 0
FOCAL WEAKNESS: 0
NAUSEA: 0
VOMITING: 0
PALPITATIONS: 0
ABDOMINAL PAIN: 0
SORE THROAT: 0
HEARTBURN: 0
FEVER: 0
ORTHOPNEA: 0

## 2024-01-11 ASSESSMENT — PAIN DESCRIPTION - PAIN TYPE
TYPE: ACUTE PAIN
TYPE: ACUTE PAIN

## 2024-01-11 NOTE — PROGRESS NOTES
Microbiology from Lab called with critical result of Positive Flu A at 1254. Critical lab result read back to Microbiology.   Dr. Ventura notified of critical lab result at 1257.  Critical lab result read back by Dr. Ventura.

## 2024-01-11 NOTE — PROGRESS NOTES
Monitor summary per monitor tech report:    Sinus Rhythm, rate 71-84  Ectopy: rare PVCs  .15/.10/.40

## 2024-01-11 NOTE — PROGRESS NOTES
Hospital Medicine Daily Progress Note    Date of Service  1/11/2024    Chief Complaint  Beni Moore is a 53 y.o. male admitted 1/9/2024 with syncope     Hospital Course  Beni Moore is a 53 y.o. 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  Patient stated that over the last 2 to 3 days, he has had worsening dizziness upon standing.  He has had 4-5 episodes where he syncopized and lost consciousness.  He is unsure how long he went out for.  Denies any head trauma. Patient is currently on Lasix 80 mg twice daily, and has been on it for the last 10 months.  He states he has never had problems in the past.  In the emergency department, patient was found to be in the systolics of 80s, which improved after 500 cc bolus.  Patient also has worsening kidney function, with a creatinine level of 4.55.  He states that he has not seen his nephrologist in a while now.  Patient otherwise denied any fevers, chills, nausea, vomiting.  Patient was admitted for management of syncope and collapse.     1/10: Afebrile, BP improved. During transfer from stretcher to bed upon arrival to unit, quite unsteady. Electrolytes supplemented and some improvement in Cr noted. Echo reviewed: normal LVEF, no wall motion abnormalities and no significant valvular diseases. Diuretics dosing adjusted.     Interval Problem Update  1/11  Low grade fever overnight  No leukocytosis   K 2.9 >3.3 > 3 (supplemented)  Ca 6.8 (supplemented)  Cr 4.18 > 3.86 > 3.95   US renal grossly unremarkable     Pt was seen bedside - reported feeling a bit better and more steady when getting up to use bathroom.    With continued electrolytes derangement, BP borderline low and need a definitive outpatient diuretics regimen, plan is to continue to monitor the patient overnight. Nephrology Dr. Najjar consulted to help guide diuretics management.     For an isolated 1x low grade  fever, will check viral panel and hold off Abx and other infectious workups (Pt is asymptomatic)    I have discussed this patient's plan of care and discharge plan at IDT rounds today with Case Management, Nursing, Nursing leadership, and other members of the IDT team.    Consultants/Specialty  N/A    Code Status  Full Code    Disposition  The patient is not medically cleared for discharge to home or a post-acute facility.  Anticipate discharge to: home with close outpatient follow-up    I have placed the appropriate orders for post-discharge needs.    Review of Systems  Review of Systems   Constitutional:  Positive for malaise/fatigue. Negative for chills and fever.   HENT:  Negative for congestion and sore throat.    Eyes:  Negative for blurred vision and double vision.   Respiratory:  Negative for cough, sputum production and shortness of breath.    Cardiovascular:  Negative for chest pain, palpitations, orthopnea and leg swelling.   Gastrointestinal:  Negative for abdominal pain, heartburn, nausea and vomiting.   Genitourinary:  Negative for dysuria, frequency and urgency.   Musculoskeletal:  Negative for myalgias and neck pain.   Neurological:  Positive for dizziness and weakness. Negative for focal weakness and headaches.   Psychiatric/Behavioral:  Negative for depression.         Physical Exam  Temp:  [36.8 °C (98.2 °F)-38.2 °C (100.8 °F)] 37.6 °C (99.6 °F)  Pulse:  [69-78] 74  Resp:  [14-16] 16  BP: (104-155)/(56-82) 104/58  SpO2:  [95 %-100 %] 97 %    Physical Exam  Vitals and nursing note reviewed.   Constitutional:       General: He is not in acute distress.     Appearance: Normal appearance. He is not ill-appearing.   HENT:      Head: Normocephalic and atraumatic.      Mouth/Throat:      Mouth: Mucous membranes are moist.      Pharynx: Oropharynx is clear.   Eyes:      General: No scleral icterus.     Conjunctiva/sclera: Conjunctivae normal.   Cardiovascular:      Rate and Rhythm: Normal rate and regular  rhythm.      Pulses: Normal pulses.      Heart sounds: Normal heart sounds.   Pulmonary:      Effort: Pulmonary effort is normal. No respiratory distress.      Breath sounds: Normal breath sounds. No wheezing.   Abdominal:      General: Bowel sounds are normal. There is no distension.      Palpations: Abdomen is soft.      Tenderness: There is no abdominal tenderness.   Musculoskeletal:         General: No swelling or tenderness. Normal range of motion.   Skin:     General: Skin is warm and dry.      Capillary Refill: Capillary refill takes less than 2 seconds.   Neurological:      General: No focal deficit present.      Mental Status: He is alert and oriented to person, place, and time. Mental status is at baseline.   Psychiatric:         Mood and Affect: Mood normal.         Fluids    Intake/Output Summary (Last 24 hours) at 1/11/2024 0836  Last data filed at 1/11/2024 0540  Gross per 24 hour   Intake --   Output 2380 ml   Net -2380 ml       Laboratory  Recent Labs     01/09/24  1917 01/10/24  0251   WBC 5.6 4.9   RBC 3.53* 3.01*   HEMOGLOBIN 10.4* 9.0*   HEMATOCRIT 31.4* 26.0*   MCV 89.0 86.4   MCH 29.5 29.9   MCHC 33.1 34.6   RDW 43.4 41.0   PLATELETCT 216 168   MPV 10.4 10.4     Recent Labs     01/10/24  0251 01/10/24  1245 01/11/24  0406   SODIUM 134* 134* 134*   POTASSIUM 2.9* 3.3* 3.0*   CHLORIDE 103 103 105   CO2 15* 17* 15*   GLUCOSE 122* 179* 106*   BUN 79* 79* 75*   CREATININE 4.18* 3.86* 3.95*   CALCIUM 7.3* 7.0* 6.8*             Recent Labs     01/09/24 1917   TRIGLYCERIDE 138   HDL 36*   LDL 43       Imaging  EC-ECHOCARDIOGRAM COMPLETE W/O CONT   Final Result      US-RENAL   Final Result         1.  Normal renal ultrasound.      DX-CHEST-PORTABLE (1 VIEW)   Final Result         1.  No acute cardiopulmonary disease.           Assessment/Plan  * Syncope and collapse- (present on admission)  Assessment & Plan  Likely vasovagal  Patient states that she has had 2 days of worsening dizziness upon standing.   He has fainted approxi-4-5 times.  He was hypotensive in the emergency department systolic blood pressure in the 80s.  Was given a 500 cc bolus with improvement of his blood pressure  Patient is on Lasix 160 mg twice daily  Echo reviewed: normal LVEF, no wall motion abnormalities and no significant valvular diseases.     With continued electrolytes derangement, BP borderline low and need a definitive outpatient diuretics regimen, plan is to continue to monitor the patient overnight. Nephrology Dr. Najjar consulted to help guide diuretics management.     Repeat Orthostatic BP    CKD (chronic kidney disease)- (present on admission)  Assessment & Plan  Patient with known history of CKD, previously followed by nephrology.  Last seen May 2023.  Now presents with worsening creatinine and GFR.  Etiology of his CKD is from diabetic nephropathy and chronic TMA  Patient presents with creatinine level of 4.55  Patient states that he is still making urine  Follow-up renal ultrasound and urinalysis    1/10: Cr trending down   1/11: Cr around 3.95    Nephrology consulted to help guide diuretics management    Type 2 diabetes mellitus with kidney complication, with long-term current use of insulin (HCC)- (present on admission)  Assessment & Plan  Insulin sliding scale  Hypoglycemia protocol  Diabetic diet    Coronary artery disease involving native coronary artery of native heart without angina pectoris- (present on admission)  Assessment & Plan  Patient with history of CAD status post stent placement  Continue with aspirin  Continue with atorvastatin  C/w Coreg         VTE prophylaxis:    heparin ppx      I have performed a physical exam and reviewed and updated ROS and Plan today (1/11/2024).

## 2024-01-11 NOTE — CARE PLAN
The patient is Stable - Low risk of patient condition declining or worsening    Shift Goals  Clinical Goals: tele monitoring, monitor labs  Patient Goals: feel better  Family Goals: JENNIFER    Progress made toward(s) clinical / shift goals:        Problem: Knowledge Deficit - Standard  Goal: Patient and family/care givers will demonstrate understanding of plan of care, disease process/condition, diagnostic tests and medications  Outcome: Progressing     Problem: Pain - Standard  Goal: Alleviation of pain or a reduction in pain to the patient’s comfort goal  Outcome: Progressing       Patient is not progressing towards the following goals:

## 2024-01-11 NOTE — PROGRESS NOTES
Assessment completed. Pt A&Ox4. Respirations are even and unlabored on RA. Pt denies pain at this time. Monitors applied, VS stable, call light and belongings within reach. POC updated (monitor on tele, monitor labs). Pt educated on room and call light, pt verbalized understanding. Communication board updated. Needs met.

## 2024-01-11 NOTE — PROGRESS NOTES
Hospital Medicine Daily Progress Note    Date of Service  1/10/2024    Chief Complaint  Beni Moore is a 53 y.o. male admitted 1/9/2024 with syncope     Hospital Course  Beni Moore is a 53 y.o. 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  Patient stated that over the last 2 to 3 days, he has had worsening dizziness upon standing.  He has had 4-5 episodes where he syncopized and lost consciousness.  He is unsure how long he went out for.  Denies any head trauma. Patient is currently on Lasix 80 mg twice daily, and has been on it for the last 10 months.  He states he has never had problems in the past.  In the emergency department, patient was found to be in the systolics of 80s, which improved after 500 cc bolus.  Patient also has worsening kidney function, with a creatinine level of 4.55.  He states that he has not seen his nephrologist in a while now.  Patient otherwise denied any fevers, chills, nausea, vomiting.  Patient was admitted for management of syncope and collapse.     Interval Problem Update  1/10  Afebrile, BP improved   During transfer from stretcher to bed upon arrival to unit, quite unsteady   K 2.9 (supplemented) >3.3  Cr 4.18 > 3.86    We discussed about the need for a close monitor especially when he was on furosemide 160mg twice daily dosing. He appeared volume depleted and orthostatic likely from overdiuresis.     Echo reviewed: normal LVEF, no wall motion abnormalities and no significant valvular diseases.     I have discussed this patient's plan of care and discharge plan at IDT rounds today with Case Management, Nursing, Nursing leadership, and other members of the IDT team.    Consultants/Specialty  N/A    Code Status  Full Code    Disposition  The patient is not medically cleared for discharge to home or a post-acute facility.  Anticipate discharge to: home with close outpatient  follow-up    I have placed the appropriate orders for post-discharge needs.    Review of Systems  Review of Systems   Constitutional:  Positive for malaise/fatigue. Negative for chills and fever.   HENT:  Negative for congestion and sore throat.    Eyes:  Negative for blurred vision and double vision.   Respiratory:  Negative for cough, sputum production and shortness of breath.    Cardiovascular:  Negative for chest pain, palpitations, orthopnea and leg swelling.   Gastrointestinal:  Negative for abdominal pain, heartburn, nausea and vomiting.   Genitourinary:  Negative for dysuria, frequency and urgency.   Musculoskeletal:  Negative for myalgias and neck pain.   Neurological:  Positive for dizziness and weakness. Negative for focal weakness and headaches.   Psychiatric/Behavioral:  Negative for depression.         Physical Exam  Temp:  [36.8 °C (98.2 °F)-38.2 °C (100.8 °F)] 38.2 °C (100.8 °F)  Pulse:  [65-79] 76  Resp:  [14-18] 16  BP: (106-158)/() 127/69  SpO2:  [95 %-100 %] 95 %    Physical Exam  Vitals and nursing note reviewed.   Constitutional:       General: He is not in acute distress.     Appearance: Normal appearance. He is not ill-appearing.   HENT:      Head: Normocephalic and atraumatic.      Mouth/Throat:      Mouth: Mucous membranes are moist.      Pharynx: Oropharynx is clear.   Eyes:      General: No scleral icterus.     Conjunctiva/sclera: Conjunctivae normal.   Cardiovascular:      Rate and Rhythm: Normal rate and regular rhythm.      Pulses: Normal pulses.      Heart sounds: Normal heart sounds.   Pulmonary:      Effort: Pulmonary effort is normal. No respiratory distress.      Breath sounds: Normal breath sounds. No wheezing.   Abdominal:      General: Bowel sounds are normal. There is no distension.      Palpations: Abdomen is soft.      Tenderness: There is no abdominal tenderness.   Musculoskeletal:         General: No swelling or tenderness. Normal range of motion.   Skin:      General: Skin is warm and dry.      Capillary Refill: Capillary refill takes less than 2 seconds.   Neurological:      General: No focal deficit present.      Mental Status: He is alert and oriented to person, place, and time. Mental status is at baseline.   Psychiatric:         Mood and Affect: Mood normal.         Fluids    Intake/Output Summary (Last 24 hours) at 1/10/2024 2031  Last data filed at 1/10/2024 1555  Gross per 24 hour   Intake --   Output 1500 ml   Net -1500 ml       Laboratory  Recent Labs     01/09/24  1917 01/10/24  0251   WBC 5.6 4.9   RBC 3.53* 3.01*   HEMOGLOBIN 10.4* 9.0*   HEMATOCRIT 31.4* 26.0*   MCV 89.0 86.4   MCH 29.5 29.9   MCHC 33.1 34.6   RDW 43.4 41.0   PLATELETCT 216 168   MPV 10.4 10.4     Recent Labs     01/09/24  1917 01/10/24  0251 01/10/24  1245   SODIUM 133* 134* 134*   POTASSIUM 3.7 2.9* 3.3*   CHLORIDE 100 103 103   CO2 17* 15* 17*   GLUCOSE 152* 122* 179*   BUN 81* 79* 79*   CREATININE 4.55* 4.18* 3.86*   CALCIUM 8.0* 7.3* 7.0*             Recent Labs     01/09/24 1917   TRIGLYCERIDE 138   HDL 36*   LDL 43       Imaging  EC-ECHOCARDIOGRAM COMPLETE W/O CONT   Final Result      US-RENAL   Final Result         1.  Normal renal ultrasound.      DX-CHEST-PORTABLE (1 VIEW)   Final Result         1.  No acute cardiopulmonary disease.           Assessment/Plan  * Syncope and collapse- (present on admission)  Assessment & Plan  Patient states that she has had 2 days of worsening dizziness upon standing.  He has fainted approxi-4-5 times.  He was hypotensive in the emergency department systolic blood pressure in the 80s.  Was given a 500 cc bolus with improvement of his blood pressure  Patient is on Lasix 160 mg twice daily  Echo reviewed: normal LVEF, no wall motion abnormalities and no significant valvular diseases.     Follow-up orthostatic vital signs  Diuretics adjusted to 40mg twice daily     CKD (chronic kidney disease)  Assessment & Plan  Patient with known history of CKD,  previously followed by nephrology.  Last seen May 2023.  Now presents with worsening creatinine and GFR.  Etiology of his CKD is from diabetic nephropathy and chronic TMA  Patient presents with creatinine level of 4.55  Patient states that he is still making urine  Follow-up renal ultrasound and urinalysis    1/10: Cr trending down     Type 2 diabetes mellitus with kidney complication, with long-term current use of insulin (HCC)- (present on admission)  Assessment & Plan  Insulin sliding scale  Hypoglycemia protocol  Diabetic diet    Coronary artery disease involving native coronary artery of native heart without angina pectoris- (present on admission)  Assessment & Plan  Patient with history of CAD status post stent placement  Continue with aspirin  Continue with atorvastatin  C/w Coreg         VTE prophylaxis:    heparin ppx      I have performed a physical exam and reviewed and updated ROS and Plan today (1/10/2024).

## 2024-01-11 NOTE — HOSPITAL COURSE
rob Moore is a 53 y.o. 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  Patient stated that over the last 2 to 3 days, he has had worsening dizziness upon standing.  He has had 4-5 episodes where he syncopized and lost consciousness.  He is unsure how long he went out for.  Denies any head trauma. Patient is currently on Lasix 80 mg twice daily, and has been on it for the last 10 months.  He states he has never had problems in the past.  In the emergency department, patient was found to be in the systolics of 80s, which improved after 500 cc bolus.  Patient also has worsening kidney function, with a creatinine level of 4.55.  He states that he has not seen his nephrologist in a while now.  Patient otherwise denied any fevers, chills, nausea, vomiting.  Patient was admitted for management of syncope and collapse.

## 2024-01-12 LAB
ALBUMIN SERPL BCP-MCNC: 2.3 G/DL (ref 3.2–4.9)
BASOPHILS # BLD AUTO: 0.2 % (ref 0–1.8)
BASOPHILS # BLD: 0.01 K/UL (ref 0–0.12)
BUN SERPL-MCNC: 65 MG/DL (ref 8–22)
CALCIUM ALBUM COR SERPL-MCNC: 8.6 MG/DL (ref 8.5–10.5)
CALCIUM SERPL-MCNC: 7.2 MG/DL (ref 8.5–10.5)
CHLORIDE SERPL-SCNC: 109 MMOL/L (ref 96–112)
CO2 SERPL-SCNC: 15 MMOL/L (ref 20–33)
CREAT SERPL-MCNC: 3.9 MG/DL (ref 0.5–1.4)
EOSINOPHIL # BLD AUTO: 0.02 K/UL (ref 0–0.51)
EOSINOPHIL NFR BLD: 0.5 % (ref 0–6.9)
ERYTHROCYTE [DISTWIDTH] IN BLOOD BY AUTOMATED COUNT: 40.9 FL (ref 35.9–50)
FERRITIN SERPL-MCNC: 289 NG/ML (ref 22–322)
GFR SERPLBLD CREATININE-BSD FMLA CKD-EPI: 17 ML/MIN/1.73 M 2
GLUCOSE BLD STRIP.AUTO-MCNC: 105 MG/DL (ref 65–99)
GLUCOSE BLD STRIP.AUTO-MCNC: 124 MG/DL (ref 65–99)
GLUCOSE BLD STRIP.AUTO-MCNC: 140 MG/DL (ref 65–99)
GLUCOSE BLD STRIP.AUTO-MCNC: 150 MG/DL (ref 65–99)
GLUCOSE SERPL-MCNC: 109 MG/DL (ref 65–99)
HCT VFR BLD AUTO: 25.8 % (ref 42–52)
HGB BLD-MCNC: 8.9 G/DL (ref 14–18)
IMM GRANULOCYTES # BLD AUTO: 0.01 K/UL (ref 0–0.11)
IMM GRANULOCYTES NFR BLD AUTO: 0.2 % (ref 0–0.9)
IRON SATN MFR SERPL: 21 % (ref 15–55)
IRON SERPL-MCNC: 45 UG/DL (ref 50–180)
LYMPHOCYTES # BLD AUTO: 2.33 K/UL (ref 1–4.8)
LYMPHOCYTES NFR BLD: 53.4 % (ref 22–41)
MCH RBC QN AUTO: 30.2 PG (ref 27–33)
MCHC RBC AUTO-ENTMCNC: 34.5 G/DL (ref 32.3–36.5)
MCV RBC AUTO: 87.5 FL (ref 81.4–97.8)
MONOCYTES # BLD AUTO: 0.43 K/UL (ref 0–0.85)
MONOCYTES NFR BLD AUTO: 9.9 % (ref 0–13.4)
NEUTROPHILS # BLD AUTO: 1.56 K/UL (ref 1.82–7.42)
NEUTROPHILS NFR BLD: 35.8 % (ref 44–72)
NRBC # BLD AUTO: 0 K/UL
NRBC BLD-RTO: 0 /100 WBC (ref 0–0.2)
PHOSPHATE SERPL-MCNC: 3.9 MG/DL (ref 2.5–4.5)
PLATELET # BLD AUTO: 144 K/UL (ref 164–446)
PMV BLD AUTO: 10.5 FL (ref 9–12.9)
POTASSIUM SERPL-SCNC: 3.9 MMOL/L (ref 3.6–5.5)
RBC # BLD AUTO: 2.95 M/UL (ref 4.7–6.1)
SODIUM SERPL-SCNC: 137 MMOL/L (ref 135–145)
TIBC SERPL-MCNC: 216 UG/DL (ref 250–450)
UIBC SERPL-MCNC: 171 UG/DL (ref 110–370)
WBC # BLD AUTO: 4.4 K/UL (ref 4.8–10.8)

## 2024-01-12 PROCEDURE — 770001 HCHG ROOM/CARE - MED/SURG/GYN PRIV*

## 2024-01-12 PROCEDURE — 83540 ASSAY OF IRON: CPT

## 2024-01-12 PROCEDURE — 700102 HCHG RX REV CODE 250 W/ 637 OVERRIDE(OP): Performed by: STUDENT IN AN ORGANIZED HEALTH CARE EDUCATION/TRAINING PROGRAM

## 2024-01-12 PROCEDURE — 99232 SBSQ HOSP IP/OBS MODERATE 35: CPT | Performed by: STUDENT IN AN ORGANIZED HEALTH CARE EDUCATION/TRAINING PROGRAM

## 2024-01-12 PROCEDURE — 82728 ASSAY OF FERRITIN: CPT

## 2024-01-12 PROCEDURE — 82962 GLUCOSE BLOOD TEST: CPT | Mod: 91

## 2024-01-12 PROCEDURE — 80069 RENAL FUNCTION PANEL: CPT

## 2024-01-12 PROCEDURE — 700105 HCHG RX REV CODE 258: Performed by: STUDENT IN AN ORGANIZED HEALTH CARE EDUCATION/TRAINING PROGRAM

## 2024-01-12 PROCEDURE — 99232 SBSQ HOSP IP/OBS MODERATE 35: CPT | Performed by: INTERNAL MEDICINE

## 2024-01-12 PROCEDURE — 85025 COMPLETE CBC W/AUTO DIFF WBC: CPT

## 2024-01-12 PROCEDURE — 700102 HCHG RX REV CODE 250 W/ 637 OVERRIDE(OP): Performed by: HOSPITALIST

## 2024-01-12 PROCEDURE — A9270 NON-COVERED ITEM OR SERVICE: HCPCS | Performed by: STUDENT IN AN ORGANIZED HEALTH CARE EDUCATION/TRAINING PROGRAM

## 2024-01-12 PROCEDURE — 700111 HCHG RX REV CODE 636 W/ 250 OVERRIDE (IP): Mod: JZ,JG | Performed by: INTERNAL MEDICINE

## 2024-01-12 PROCEDURE — 700101 HCHG RX REV CODE 250: Performed by: STUDENT IN AN ORGANIZED HEALTH CARE EDUCATION/TRAINING PROGRAM

## 2024-01-12 PROCEDURE — 700111 HCHG RX REV CODE 636 W/ 250 OVERRIDE (IP): Performed by: STUDENT IN AN ORGANIZED HEALTH CARE EDUCATION/TRAINING PROGRAM

## 2024-01-12 PROCEDURE — 83550 IRON BINDING TEST: CPT

## 2024-01-12 PROCEDURE — A9270 NON-COVERED ITEM OR SERVICE: HCPCS | Performed by: HOSPITALIST

## 2024-01-12 RX ORDER — OSELTAMIVIR PHOSPHATE 30 MG/1
30 CAPSULE ORAL DAILY
Qty: 4 CAPSULE | Refills: 0 | Status: CANCELLED | OUTPATIENT
Start: 2024-01-12 | End: 2024-01-16

## 2024-01-12 RX ORDER — SODIUM CHLORIDE 9 MG/ML
500 INJECTION, SOLUTION INTRAVENOUS ONCE
Status: COMPLETED | OUTPATIENT
Start: 2024-01-12 | End: 2024-01-12

## 2024-01-12 RX ORDER — SODIUM BICARBONATE 650 MG/1
650 TABLET ORAL 3 TIMES DAILY
Status: DISCONTINUED | OUTPATIENT
Start: 2024-01-12 | End: 2024-01-13 | Stop reason: HOSPADM

## 2024-01-12 RX ORDER — SODIUM CHLORIDE 9 MG/ML
INJECTION, SOLUTION INTRAVENOUS CONTINUOUS
Status: ACTIVE | OUTPATIENT
Start: 2024-01-12 | End: 2024-01-13

## 2024-01-12 RX ORDER — SODIUM BICARBONATE 650 MG/1
650 TABLET ORAL 3 TIMES DAILY
Qty: 120 TABLET | Refills: 0 | Status: CANCELLED | OUTPATIENT
Start: 2024-01-12

## 2024-01-12 RX ADMIN — SODIUM CHLORIDE: 9 INJECTION, SOLUTION INTRAVENOUS at 14:57

## 2024-01-12 RX ADMIN — INSULIN HUMAN 1 UNITS: 100 INJECTION, SOLUTION PARENTERAL at 20:40

## 2024-01-12 RX ADMIN — ATORVASTATIN CALCIUM 80 MG: 80 TABLET, FILM COATED ORAL at 20:35

## 2024-01-12 RX ADMIN — SODIUM BICARBONATE 650 MG: 650 TABLET ORAL at 09:06

## 2024-01-12 RX ADMIN — OMEPRAZOLE 20 MG: 20 CAPSULE, DELAYED RELEASE ORAL at 05:28

## 2024-01-12 RX ADMIN — Medication 1 EACH: at 05:29

## 2024-01-12 RX ADMIN — SODIUM CHLORIDE: 9 INJECTION, SOLUTION INTRAVENOUS at 08:50

## 2024-01-12 RX ADMIN — Medication 950 MG: at 20:35

## 2024-01-12 RX ADMIN — OSELTAMIVIR PHOSPHATE 30 MG: 30 CAPSULE ORAL at 20:36

## 2024-01-12 RX ADMIN — ASPIRIN 81 MG: 81 TABLET, COATED ORAL at 05:28

## 2024-01-12 RX ADMIN — SODIUM CHLORIDE 500 ML: 9 INJECTION, SOLUTION INTRAVENOUS at 09:04

## 2024-01-12 RX ADMIN — HEPARIN SODIUM 5000 UNITS: 5000 INJECTION, SOLUTION INTRAVENOUS; SUBCUTANEOUS at 16:03

## 2024-01-12 RX ADMIN — CHOLESTYRAMINE 4 G: 4 POWDER, FOR SUSPENSION ORAL at 18:30

## 2024-01-12 RX ADMIN — Medication 1 EACH: at 20:35

## 2024-01-12 RX ADMIN — Medication 950 MG: at 05:29

## 2024-01-12 RX ADMIN — CARVEDILOL 3.12 MG: 3.12 TABLET, FILM COATED ORAL at 20:36

## 2024-01-12 RX ADMIN — HEPARIN SODIUM 5000 UNITS: 5000 INJECTION, SOLUTION INTRAVENOUS; SUBCUTANEOUS at 05:28

## 2024-01-12 RX ADMIN — SODIUM CHLORIDE: 9 INJECTION, SOLUTION INTRAVENOUS at 20:46

## 2024-01-12 RX ADMIN — EPOETIN ALFA 4000 UNITS: 4000 SOLUTION INTRAVENOUS; SUBCUTANEOUS at 18:15

## 2024-01-12 RX ADMIN — SODIUM BICARBONATE 650 MG: 650 TABLET ORAL at 16:42

## 2024-01-12 RX ADMIN — SODIUM BICARBONATE 650 MG: 650 TABLET ORAL at 20:36

## 2024-01-12 RX ADMIN — HEPARIN SODIUM 5000 UNITS: 5000 INJECTION, SOLUTION INTRAVENOUS; SUBCUTANEOUS at 23:40

## 2024-01-12 RX ADMIN — CHOLESTYRAMINE 4 G: 4 POWDER, FOR SUSPENSION ORAL at 10:00

## 2024-01-12 ASSESSMENT — ENCOUNTER SYMPTOMS
DOUBLE VISION: 0
COUGH: 0
VOMITING: 0
NECK PAIN: 0
BLURRED VISION: 0
HEARTBURN: 0
MYALGIAS: 0
WEAKNESS: 1
CHILLS: 0
SHORTNESS OF BREATH: 0
NAUSEA: 0
SPUTUM PRODUCTION: 0
FEVER: 0
ORTHOPNEA: 0
FOCAL WEAKNESS: 0
SORE THROAT: 0
ABDOMINAL PAIN: 0
DEPRESSION: 0
DIZZINESS: 1
HEADACHES: 0
PALPITATIONS: 0

## 2024-01-12 NOTE — CONSULTS
DATE OF SERVICE:  01/11/2024     REQUESTING PHYSICIAN:  Luis Ventura MD     REASON FOR CONSULTATION:  Acute kidney injury on chronic kidney disease stage   IIIB.     The patient seen and examined, medical record reviewed.     HISTORY OF PRESENT ILLNESS:  The patient is a 53-year-old gentleman with past   medical history significant for chronic kidney disease stage IIIB with   baseline creatinine in 07/2023 at 2.1.  However, repeat labs in 12/2023   creatinine was up to 4.9. The patient is under the care of my associate, Dr. Varun Ortiz. The patient has diabetic nephropathy (biopsy proven).     The patient presented to the hospital on 01/09 with syncopal episode and   hypotension.  The patient was found to be in acute kidney injury with a   creatinine up at 4.18 yesterday and today 3.95.     The patient has no recent use of NSAIDs or IV contrast exposure.     The patient was diagnosed with flu, influenza A infection.     The patient had no recent use of NSAIDs or IV contrast exposure.     PAST MEDICAL HISTORY:  Significant for:  1.  Diabetes mellitus.  2.  Diabetic nephropathy.  3.  Hypertension.     ALLERGIES:  No known drug allergies.     SOCIAL HISTORY:  The patient had a remote history of smoking, quit about 20   years ago.     FAMILY HISTORY:  No known renal disease.     MEDICATIONS:  Reviewed.     REVIEW OF SYSTEMS:  All systems were reviewed. They were negative except   outlined in the history of present illness.     PHYSICAL EXAMINATION:  GENERAL:  The patient appears ill, but no apparent distress.  VITAL SIGNS:  Showed blood pressure of 124/71, heart rate was 67, respiratory   rate was 16.  HEENT:  Normocephalic, atraumatic.  Sclerae are anicteric.  Pupils are   reactive.  Nose normal.  Mucous membranes moist.  NECK:  No lymphadenopathy, no JVD, no thyroid mass.  CHEST:  Normal.  LUNGS:  Clear to auscultation.  HEART:  S1, S2.  ABDOMEN:  Soft, nontender.  No hepatosplenomegaly.  There is no inguinal    lymphadenopathy.  EXTREMITIES:  There is no lower extremity edema.  SKIN:  No skin rash.  NEUROLOGIC:  The patient is alert and oriented x3.  MOOD:  Normal.     LABORATORY DATA:  His recent labs from today were reviewed.     DIAGNOSTIC DATA:  The patient had a renal ultrasound done on 01/09, I reviewed   the image myself, which showed no hydronephrosis.     ASSESSMENT:  1.  Acute kidney injury on chronic kidney disease stage IIIB.  The etiology is   not very clear, but most likely progression of his diabetic nephropathy.  2.  Anemia.  3.  Hypokalemia.  4.  Metabolic acidosis.  5.  Diabetes mellitus.     PLAN:  1.  There is no acute need for renal placement therapy.  2.  Replace potassium.  3.  Start sodium bicarbonate orally.  4.  Daily labs.  5.  Avoid nephrotoxin.  6.  Renal diet.  7.  Prognosis is guarded.  8.  Check iron panel and if iron panel is normal, then we will start   erythropoietin supplement.     Plan discussed in detail with Dr. Ventura.        ______________________________  FADI NAJJAR, MD FN/BARBARA    DD:  01/11/2024 14:37  DT:  01/11/2024 18:15    Job#:  [id number]

## 2024-01-12 NOTE — WOUND TEAM
[facility]  Inpatient Services  Wound and Skin Care Brief Evaluation    Admission Date: 1/9/2024     Last order of IP CONSULT TO WOUND CARE was found on 1/10/2024 from Hospital Encounter on 1/9/2024     HPI, PMH, SH: Reviewed    Chief Complaint   Patient presents with    Syncope     PT reports he has had multiple episodes of syncope since yesterday evening. PT checked his BP at home and has noted systolic has been consistently in the 80s. PT denies trauma, some contusions noted on arms, PT denies head strike or any discomfort. PT denies CP. FSBS 147 per EMS.    Hypotension     Diagnosis: Syncope and collapse [R55]    Unit where seen by Wound Team: T203/00     Wound consult placed regarding right forearm, RLE. And left foot. Chart and images reviewed. This clinician in to assess patient. Patient pleasant and agreeable. Right forearm and RLE with dry abrasions. Antibiotic ointment ordered to keep clean and free from infection. Callus removed from distal aspect of left 2nd toe revealing intact skin underneath. Left 2nd toe also noted to have traumatic blisters, OK to leave MICHELLE. Left 4th and 5th toe webspace with traumatic partial thickness wound, covered with bandaid.    BL heels and sacrum also assessed during encounter and found to be pink and intact.     No pressure injuries or advanced wound care needs identified. Wound consult completed. No further follow up unless indicated and consulted.     Wound 01/11/24 Partial Thickness Wound Toe, 4th;Toe, 5th Left webspace (Active)   Date First Assessed/Time First Assessed: 01/11/24 1600   Present on Original Admission: Yes  Primary Wound Type: Partial Thickness Wound  Location: Toe, 4th;Toe, 5th  Laterality: Left  Wound Description (Comments): webspace      Assessments 1/11/2024  4:00 PM   Wound Image      Site Assessment Red   Periwound Assessment Pink;Blistered   Margins Attached edges   Closure Adhesive bandage   Drainage Amount Scant   Drainage  Description Serosanguineous   Treatments Cleansed;Site care   Wound Cleansing Normal Saline Irrigation   Periwound Protectant Not Applicable   Dressing Status Clean;Dry;Intact   Dressing Changed New   Dressing Cleansing/Solutions Not Applicable   Dressing Options Bandaid   Dressing Change/Treatment Frequency Every 48 hrs, and As Needed   NEXT Dressing Change/Treatment Date 01/13/24   NEXT Weekly Photo (Inpatient Only) 01/18/24   Wound Team Following Not following       PREVENTATIVE INTERVENTIONS:    Q shift Johnny - performed per nursing policy  Q shift pressure point assessments - performed per nursing policy

## 2024-01-12 NOTE — PROGRESS NOTES
Nephrology Daily Progress Note    Date of Service  1/12/2024    Chief Complaint  53 y.o. male admitted 1/9/2024 with generalized weakness, was diagnosed with influenza A infection, AXEL.    Interval Problem Update  Patient feels tired today, hide orthostatic hypotension earlier.    Review of Systems  Review of Systems   Constitutional:  Positive for malaise/fatigue. Negative for chills and fever.   Respiratory:  Negative for cough and shortness of breath.    Cardiovascular:  Negative for chest pain and leg swelling.   Gastrointestinal:  Negative for nausea and vomiting.   Genitourinary:  Negative for dysuria, frequency and urgency.        Physical Exam  Temp:  [37.1 °C (98.7 °F)-37.6 °C (99.7 °F)] 37.2 °C (99 °F)  Pulse:  [68-84] 84  Resp:  [14-18] 18  BP: ()/(46-75) 67/46  SpO2:  [96 %-98 %] 97 %    Physical Exam  Vitals and nursing note reviewed.   Constitutional:       General: He is awake.      Appearance: He is not ill-appearing.   HENT:      Head: Normocephalic and atraumatic.      Right Ear: External ear normal.      Left Ear: External ear normal.      Nose: Nose normal.      Mouth/Throat:      Pharynx: No oropharyngeal exudate or posterior oropharyngeal erythema.   Eyes:      General:         Right eye: No discharge.         Left eye: No discharge.      Conjunctiva/sclera: Conjunctivae normal.   Cardiovascular:      Rate and Rhythm: Normal rate and regular rhythm.   Pulmonary:      Effort: Pulmonary effort is normal. No respiratory distress.      Breath sounds: Normal breath sounds. No wheezing.   Abdominal:      General: Abdomen is flat. Bowel sounds are normal.   Musculoskeletal:         General: No tenderness.      Cervical back: No rigidity. No muscular tenderness.      Right lower leg: No edema.      Left lower leg: No edema.   Skin:     General: Skin is warm and dry.      Coloration: Skin is not jaundiced.      Findings: No lesion or rash.   Neurological:      General: No focal deficit present.       Mental Status: He is alert and oriented to person, place, and time. Mental status is at baseline.   Psychiatric:         Mood and Affect: Mood normal.         Behavior: Behavior normal.         Thought Content: Thought content normal.         Fluids    Intake/Output Summary (Last 24 hours) at 1/12/2024 1456  Last data filed at 1/12/2024 1140  Gross per 24 hour   Intake 300 ml   Output 2325 ml   Net -2025 ml       Laboratory  Recent Labs     01/09/24  1917 01/10/24  0251 01/12/24  0344   WBC 5.6 4.9 4.4*   RBC 3.53* 3.01* 2.95*   HEMOGLOBIN 10.4* 9.0* 8.9*   HEMATOCRIT 31.4* 26.0* 25.8*   MCV 89.0 86.4 87.5   MCH 29.5 29.9 30.2   MCHC 33.1 34.6 34.5   RDW 43.4 41.0 40.9   PLATELETCT 216 168 144*   MPV 10.4 10.4 10.5     Recent Labs     01/11/24  0406 01/11/24  1801 01/12/24  0344   SODIUM 134* 133* 137   POTASSIUM 3.0* 3.8 3.9   CHLORIDE 105 105 109   CO2 15* 15* 15*   GLUCOSE 106* 195* 109*   BUN 75* 71* 65*   CREATININE 3.95* 4.21* 3.90*   CALCIUM 6.8* 7.1* 7.2*         Recent Labs     01/09/24 1917   NTPROBNP 606*     Recent Labs     01/09/24 1917   TRIGLYCERIDE 138   HDL 36*   LDL 43       Imaging  EC-ECHOCARDIOGRAM COMPLETE W/O CONT   Final Result      US-RENAL   Final Result         1.  Normal renal ultrasound.      DX-CHEST-PORTABLE (1 VIEW)   Final Result         1.  No acute cardiopulmonary disease.            Assessment/Plan  1 AXEL on CKD stage IIIb: I believe this is progression of his diabetic nephropathy  2 metabolic acidosis  3 orthostatic hypotension  4 influenza A infection  5 anemia  no acute need for HD  Renal diet  Daily BMP, CBC.  Iron study are okay, start erythropoietin  Renal dose all meds  Avoid nephrotoxins like NSAIDs.  Discussed with Dr. Ventura

## 2024-01-12 NOTE — CARE PLAN
The patient is Stable - Low risk of patient condition declining or worsening    Shift Goals  Clinical Goals: monitor BP, wound care, monitor labs  Patient Goals: Rest  Family Goals: JENNIFER      Problem: Knowledge Deficit - Standard  Goal: Patient and family/care givers will demonstrate understanding of plan of care, disease process/condition, diagnostic tests and medications  Outcome: Progressing     Problem: Pain - Standard  Goal: Alleviation of pain or a reduction in pain to the patient’s comfort goal  Outcome: Progressing

## 2024-01-12 NOTE — PROGRESS NOTES
Hospital Medicine Daily Progress Note    Date of Service  1/12/2024    Chief Complaint  Beni Moore is a 53 y.o. male admitted 1/9/2024 with syncope     Hospital Course  Beni Moore is a 53 y.o. 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  Patient stated that over the last 2 to 3 days, he has had worsening dizziness upon standing.  He has had 4-5 episodes where he syncopized and lost consciousness.  He is unsure how long he went out for.  Denies any head trauma. Patient is currently on Lasix 80 mg twice daily, and has been on it for the last 10 months.  He states he has never had problems in the past.  In the emergency department, patient was found to be in the systolics of 80s, which improved after 500 cc bolus.  Patient also has worsening kidney function, with a creatinine level of 4.55.  He states that he has not seen his nephrologist in a while now.  Patient otherwise denied any fevers, chills, nausea, vomiting.  Patient was admitted for management of syncope and collapse.     1/10: Afebrile, BP improved. During transfer from stretcher to bed upon arrival to unit, quite unsteady. Electrolytes supplemented and some improvement in Cr noted. Echo reviewed: normal LVEF, no wall motion abnormalities and no significant valvular diseases. Diuretics dosing adjusted.     1/11: Due to low grade fever, viral panel sent - positive for influenza (started Tamiflu renal adjusted); K and Ca supplemented. Renal function about the same. US renal grossly unremarkable. Diuretics was on hold. Nephrology Dr. Najjar consulted to help guide diuretics management.     Interval Problem Update  1/12  Afebrile and vitals wnl   No leukocytosis   K wnl and corrected Ca now in normal range  Cr 4.18 > 3.86 > 3.95 > 4.21 > 3.9    Initial plan was to DC home with a close outpatient follow up.     Orthostatic BP was repeated - upon standing, BP  dropped to 67/46 with dizziness. Pt reported feeling worse today.    At this point, NS 500cc bolus ordered and planned for 100cc maintenance bolus for 24hrs.     Sodium bicarb added for metabolic acidosis.    I have discussed this patient's plan of care and discharge plan at IDT rounds today with Case Management, Nursing, Nursing leadership, and other members of the IDT team.    Consultants/Specialty  N/A    Code Status  Full Code    Disposition  The patient is not medically cleared for discharge to home or a post-acute facility.  Anticipate discharge to: home with close outpatient follow-up    I have placed the appropriate orders for post-discharge needs.    Review of Systems  Review of Systems   Constitutional:  Positive for malaise/fatigue. Negative for chills and fever.   HENT:  Negative for congestion and sore throat.    Eyes:  Negative for blurred vision and double vision.   Respiratory:  Negative for cough, sputum production and shortness of breath.    Cardiovascular:  Negative for chest pain, palpitations, orthopnea and leg swelling.   Gastrointestinal:  Negative for abdominal pain, heartburn, nausea and vomiting.   Genitourinary:  Negative for dysuria, frequency and urgency.   Musculoskeletal:  Negative for myalgias and neck pain.   Neurological:  Positive for dizziness and weakness. Negative for focal weakness and headaches.   Psychiatric/Behavioral:  Negative for depression.         Physical Exam  Temp:  [37.1 °C (98.7 °F)-37.6 °C (99.7 °F)] 37.6 °C (99.7 °F)  Pulse:  [66-84] 84  Resp:  [14-18] 18  BP: ()/(46-75) 67/46  SpO2:  [96 %-98 %] 97 %    Physical Exam  Vitals and nursing note reviewed.   Constitutional:       General: He is not in acute distress.     Appearance: Normal appearance. He is not ill-appearing.      Comments: Clinically appeared dry   HENT:      Head: Normocephalic and atraumatic.      Mouth/Throat:      Mouth: Mucous membranes are moist.      Pharynx: Oropharynx is clear.    Eyes:      General: No scleral icterus.     Conjunctiva/sclera: Conjunctivae normal.   Cardiovascular:      Rate and Rhythm: Normal rate and regular rhythm.      Pulses: Normal pulses.      Heart sounds: Normal heart sounds.   Pulmonary:      Effort: Pulmonary effort is normal. No respiratory distress.      Breath sounds: Normal breath sounds. No wheezing.   Abdominal:      General: Bowel sounds are normal. There is no distension.      Palpations: Abdomen is soft.      Tenderness: There is no abdominal tenderness.   Musculoskeletal:         General: No swelling or tenderness. Normal range of motion.   Skin:     General: Skin is warm and dry.      Capillary Refill: Capillary refill takes less than 2 seconds.   Neurological:      General: No focal deficit present.      Mental Status: He is alert and oriented to person, place, and time. Mental status is at baseline.   Psychiatric:         Mood and Affect: Mood normal.         Fluids    Intake/Output Summary (Last 24 hours) at 1/12/2024 0758  Last data filed at 1/12/2024 0500  Gross per 24 hour   Intake 300 ml   Output 2700 ml   Net -2400 ml       Laboratory  Recent Labs     01/09/24  1917 01/10/24  0251 01/12/24  0344   WBC 5.6 4.9 4.4*   RBC 3.53* 3.01* 2.95*   HEMOGLOBIN 10.4* 9.0* 8.9*   HEMATOCRIT 31.4* 26.0* 25.8*   MCV 89.0 86.4 87.5   MCH 29.5 29.9 30.2   MCHC 33.1 34.6 34.5   RDW 43.4 41.0 40.9   PLATELETCT 216 168 144*   MPV 10.4 10.4 10.5     Recent Labs     01/11/24  0406 01/11/24  1801 01/12/24  0344   SODIUM 134* 133* 137   POTASSIUM 3.0* 3.8 3.9   CHLORIDE 105 105 109   CO2 15* 15* 15*   GLUCOSE 106* 195* 109*   BUN 75* 71* 65*   CREATININE 3.95* 4.21* 3.90*   CALCIUM 6.8* 7.1* 7.2*             Recent Labs     01/09/24 1917   TRIGLYCERIDE 138   HDL 36*   LDL 43       Imaging  EC-ECHOCARDIOGRAM COMPLETE W/O CONT   Final Result      US-RENAL   Final Result         1.  Normal renal ultrasound.      DX-CHEST-PORTABLE (1 VIEW)   Final Result         1.  No  acute cardiopulmonary disease.           Assessment/Plan  * Syncope and collapse- (present on admission)  Assessment & Plan  Likely vasovagal  Patient states that she has had 2 days of worsening dizziness upon standing.  He has fainted approxi-4-5 times.  He was hypotensive in the emergency department systolic blood pressure in the 80s.  Was given a 500 cc bolus with improvement of his blood pressure  Patient was  on Lasix 160 mg twice daily  Echo reviewed: normal LVEF, no wall motion abnormalities and no significant valvular diseases.     Nephrology Dr. Najjar consulted to help guide diuretics management.     1/12: Initial plan was to DC home with a close outpatient follow up.   Orthostatic BP was repeated - upon standing, BP dropped to 67/46 with dizziness. Pt reported feeling worse today.  At this point, NS 500cc bolus ordered and planned for 100cc maintenance bolus for 24hrs.     CKD (chronic kidney disease)- (present on admission)  Assessment & Plan  Patient with known history of CKD, previously followed by nephrology.  Last seen May 2023.  Now presents with worsening creatinine and GFR.  Etiology of his CKD is from diabetic nephropathy and chronic TMA  Patient presents with creatinine level of 4.55  Patient states that he is still making urine  Follow-up renal ultrasound and urinalysis    Nephrology consulted to help guide diuretics management    1/12: Cr has been stabilizing around 3.9    Type 2 diabetes mellitus with kidney complication, with long-term current use of insulin (HCC)- (present on admission)  Assessment & Plan  Insulin sliding scale  Hypoglycemia protocol  Diabetic diet    Coronary artery disease involving native coronary artery of native heart without angina pectoris- (present on admission)  Assessment & Plan  Patient with history of CAD status post stent placement  Continue with aspirin  Continue with atorvastatin  C/w Coreg (with holding parameters)         VTE prophylaxis:    heparin  ppx      I have performed a physical exam and reviewed and updated ROS and Plan today (1/12/2024).

## 2024-01-13 VITALS
TEMPERATURE: 97.8 F | SYSTOLIC BLOOD PRESSURE: 140 MMHG | BODY MASS INDEX: 25.08 KG/M2 | OXYGEN SATURATION: 98 % | RESPIRATION RATE: 18 BRPM | HEART RATE: 71 BPM | DIASTOLIC BLOOD PRESSURE: 84 MMHG | WEIGHT: 185.19 LBS | HEIGHT: 72 IN

## 2024-01-13 LAB
ALBUMIN SERPL BCP-MCNC: 2.2 G/DL (ref 3.2–4.9)
BUN SERPL-MCNC: 52 MG/DL (ref 8–22)
CALCIUM ALBUM COR SERPL-MCNC: 8.4 MG/DL (ref 8.5–10.5)
CALCIUM SERPL-MCNC: 7 MG/DL (ref 8.5–10.5)
CHLORIDE SERPL-SCNC: 111 MMOL/L (ref 96–112)
CO2 SERPL-SCNC: 14 MMOL/L (ref 20–33)
CREAT SERPL-MCNC: 3.19 MG/DL (ref 0.5–1.4)
GFR SERPLBLD CREATININE-BSD FMLA CKD-EPI: 22 ML/MIN/1.73 M 2
GLUCOSE BLD STRIP.AUTO-MCNC: 123 MG/DL (ref 65–99)
GLUCOSE SERPL-MCNC: 124 MG/DL (ref 65–99)
PHOSPHATE SERPL-MCNC: 3.1 MG/DL (ref 2.5–4.5)
POTASSIUM SERPL-SCNC: 3.4 MMOL/L (ref 3.6–5.5)
SODIUM SERPL-SCNC: 136 MMOL/L (ref 135–145)

## 2024-01-13 PROCEDURE — 700102 HCHG RX REV CODE 250 W/ 637 OVERRIDE(OP): Performed by: HOSPITALIST

## 2024-01-13 PROCEDURE — A9270 NON-COVERED ITEM OR SERVICE: HCPCS | Performed by: STUDENT IN AN ORGANIZED HEALTH CARE EDUCATION/TRAINING PROGRAM

## 2024-01-13 PROCEDURE — A9270 NON-COVERED ITEM OR SERVICE: HCPCS | Performed by: HOSPITALIST

## 2024-01-13 PROCEDURE — 80069 RENAL FUNCTION PANEL: CPT

## 2024-01-13 PROCEDURE — 700102 HCHG RX REV CODE 250 W/ 637 OVERRIDE(OP): Performed by: STUDENT IN AN ORGANIZED HEALTH CARE EDUCATION/TRAINING PROGRAM

## 2024-01-13 PROCEDURE — 700101 HCHG RX REV CODE 250: Performed by: STUDENT IN AN ORGANIZED HEALTH CARE EDUCATION/TRAINING PROGRAM

## 2024-01-13 PROCEDURE — 82962 GLUCOSE BLOOD TEST: CPT

## 2024-01-13 PROCEDURE — 99239 HOSP IP/OBS DSCHRG MGMT >30: CPT | Performed by: STUDENT IN AN ORGANIZED HEALTH CARE EDUCATION/TRAINING PROGRAM

## 2024-01-13 RX ORDER — SODIUM BICARBONATE 650 MG/1
650 TABLET ORAL 3 TIMES DAILY
Qty: 120 TABLET | Refills: 0 | Status: ON HOLD | OUTPATIENT
Start: 2024-01-13 | End: 2024-02-21

## 2024-01-13 RX ORDER — FUROSEMIDE 40 MG/1
20 TABLET ORAL
Qty: 30 TABLET | Refills: 0 | Status: ON HOLD | OUTPATIENT
Start: 2024-01-13 | End: 2024-02-05

## 2024-01-13 RX ORDER — POTASSIUM CHLORIDE 20 MEQ/1
40 TABLET, EXTENDED RELEASE ORAL ONCE
Status: COMPLETED | OUTPATIENT
Start: 2024-01-13 | End: 2024-01-13

## 2024-01-13 RX ORDER — OSELTAMIVIR PHOSPHATE 30 MG/1
30 CAPSULE ORAL DAILY
Qty: 3 CAPSULE | Refills: 0 | Status: ACTIVE | OUTPATIENT
Start: 2024-01-14 | End: 2024-01-17

## 2024-01-13 RX ADMIN — CARVEDILOL 3.12 MG: 3.12 TABLET, FILM COATED ORAL at 08:07

## 2024-01-13 RX ADMIN — Medication: at 06:00

## 2024-01-13 RX ADMIN — CHOLESTYRAMINE 4 G: 4 POWDER, FOR SUSPENSION ORAL at 10:19

## 2024-01-13 RX ADMIN — Medication 950 MG: at 06:14

## 2024-01-13 RX ADMIN — POTASSIUM CHLORIDE 40 MEQ: 1500 TABLET, EXTENDED RELEASE ORAL at 08:07

## 2024-01-13 RX ADMIN — SODIUM BICARBONATE 650 MG: 650 TABLET ORAL at 08:23

## 2024-01-13 RX ADMIN — OMEPRAZOLE 20 MG: 20 CAPSULE, DELAYED RELEASE ORAL at 06:13

## 2024-01-13 RX ADMIN — ASPIRIN 81 MG: 81 TABLET, COATED ORAL at 06:13

## 2024-01-13 ASSESSMENT — PAIN DESCRIPTION - PAIN TYPE: TYPE: ACUTE PAIN

## 2024-01-13 NOTE — CARE PLAN
The patient is Watcher - Medium risk of patient condition declining or worsening    Shift Goals  Clinical Goals: monitor labs, comfort  Patient Goals: comfort  Family Goals: not at bedside    Progress made toward(s) clinical / shift goals:    Problem: Knowledge Deficit - Standard  Goal: Patient and family/care givers will demonstrate understanding of plan of care, disease process/condition, diagnostic tests and medications  Outcome: Progressing     Problem: Pain - Standard  Goal: Alleviation of pain or a reduction in pain to the patient’s comfort goal  Outcome: Progressing       Patient is not progressing towards the following goals:

## 2024-01-13 NOTE — CARE PLAN
The patient is Stable - Low risk of patient condition declining or worsening    Shift Goals  Clinical Goals: monitor vitals, orthostatics  Patient Goals: rest  Family Goals: not at bedside    Progress made toward(s) clinical / shift goals:    Problem: Pain - Standard  Goal: Alleviation of pain or a reduction in pain to the patient’s comfort goal  Outcome: Progressing       Patient aaox4, resting comfortably in bed at this time. Meds adminstered per MAR. Patient instructed to use call light when ambulated d/t +orthostatics. Denies any pain at this time, +flu, per patient he does not have any severe symptoms at this time. Call light and personal items within reach.

## 2024-01-13 NOTE — PROGRESS NOTES
4 Eyes Skin Assessment Completed by JULIANO Reed and JULIANO Huang.    Head WDL  Ears WDL  Nose WDL  Mouth WDL  Neck WDL  Breast/Chest WDL  Shoulder Blades WDL  Spine WDL  (R) Arm/Elbow/Hand Scab  (L) Arm/Elbow/Hand WDL, scar  Abdomen WDL  Groin WDL  Scrotum/Coccyx/Buttocks Redness and Blanching  (R) Leg Scab  (L) Leg WDL  (R) Heel/Foot/Toe WDL all toe amputee  (L) Heel/Foot/Toe WDL one toe amputee          Devices In Places n/a      Interventions In Place Pillows    Possible Skin Injury No    Pictures Uploaded Into Epic N/A  Wound Consult Placed N/A  RN Wound Prevention Protocol Ordered No

## 2024-01-13 NOTE — CARE PLAN
The patient is Stable - Low risk of patient condition declining or worsening    Shift Goals  Clinical Goals: monitor BP, safety  Patient Goals: rest and comfort  Family Goals: mima    Progress made toward(s) clinical / shift goals:      Problem: Knowledge Deficit - Standard  Goal: Patient and family/care givers will demonstrate understanding of plan of care, disease process/condition, diagnostic tests and medications  Outcome: Met     Problem: Pain - Standard  Goal: Alleviation of pain or a reduction in pain to the patient’s comfort goal  Outcome: Met       Axo4. Orthostatic BP done. Able to make needs known. VSS WNL. Denies pain. Afebrile. Remained free from injury and falls. Due medications given as ordered. Ambulates by self. Transfers with self. Placed call light and personal belongings within reach. Needs met at this time.

## 2024-01-14 NOTE — DISCHARGE SUMMARY
Discharge Summary    CHIEF COMPLAINT ON ADMISSION  Chief Complaint   Patient presents with    Syncope     PT reports he has had multiple episodes of syncope since yesterday evening. PT checked his BP at home and has noted systolic has been consistently in the 80s. PT denies trauma, some contusions noted on arms, PT denies head strike or any discomfort. PT denies CP. FSBS 147 per EMS.    Hypotension       Reason for Admission  Syncope     Admission Date  1/9/2024    CODE STATUS  FULL    HPI & HOSPITAL COURSE  Beni Moore is a 53 y.o. 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  Patient stated that over the last 2 to 3 days, he has had worsening dizziness upon standing.  He has had 4-5 episodes where he syncopized and lost consciousness.  He is unsure how long he went out for.  Denies any head trauma. Patient is currently on Lasix 80 mg twice daily, and has been on it for the last 10 months.  He states he has never had problems in the past.  In the emergency department, patient was found to be in the systolics of 80s, which improved after 500 cc bolus.  Patient also has worsening kidney function, with a creatinine level of 4.55.  He states that he has not seen his nephrologist in a while now.  Patient otherwise denied any fevers, chills, nausea, vomiting.  Patient was admitted for management of syncope and collapse.      1/10: Afebrile, BP improved. During transfer from stretcher to bed upon arrival to unit, quite unsteady. Electrolytes supplemented and some improvement in Cr noted. Echo reviewed: normal LVEF, no wall motion abnormalities and no significant valvular diseases. Diuretics dosing adjusted.      1/11: Due to low grade fever, viral panel sent - positive for influenza (started Tamiflu renal adjusted); K and Ca supplemented. Renal function about the same. US renal grossly unremarkable. Diuretics was on hold.  Nephrology Dr. Najjar consulted to help guide diuretics management.     1/12: Renal function relatively stable. Initial plan was to DC home with a close outpatient follow up. Orthostatic BP was repeated - upon standing, BP dropped to 67/46 with dizziness. Pt reported feeling worse today. At this point, NS 500cc bolus ordered and planned for 100cc maintenance bolus for 24hrs. Sodium bicarb added for metabolic acidosis. EPO for anemia of chronic disease.    1/13: Afebrile and vitals wnl. Noted improvement in renal function with improved Cr while on IVF. Pt reported much improved clinically. Exam at bedside grossly unremarkable. Did note a drop in BP upon standing but Pt was asymptomatic. At this point, I advised taking time with body position change and a close outpatient follow up with PCP and nephrology as outpatient. I advised holding off daily lasix but low dose 20mg daily prn provided for noted significant edema and weight gain >2lbs in one day.     Therefore, he is discharged in fair and stable condition to home with close outpatient follow-up.    The patient met 2-midnight criteria for an inpatient stay at the time of discharge.    Discharge Date  1/13/2024    FOLLOW UP ITEMS POST DISCHARGE  N/A    DISCHARGE DIAGNOSES  Principal Problem:    Syncope and collapse (POA: Yes)  Active Problems:    Coronary artery disease involving native coronary artery of native heart without angina pectoris (POA: Yes)    Type 2 diabetes mellitus with kidney complication, with long-term current use of insulin (HCC) (POA: Yes)    CKD (chronic kidney disease) (POA: Yes)  Resolved Problems:    * No resolved hospital problems. *      FOLLOW UP  Varun Ortiz M.D.  [address]  [address]  [facility] [id number] [phone]    Follow up in 1 week(s)      Florinda Pascual M.D.  [address]  [facility] [id number] [phone]    Schedule an appointment as soon as possible for a visit in 1 week(s)        MEDICATIONS ON DISCHARGE      Medication List        START taking these medications        Instructions   oseltamivir 30 MG Caps  Start taking on: January 14, 2024  Commonly known as: Tamiflu   Take 1 Capsule by mouth every day at 6 PM for 3 days.  Dose: 30 mg     sodium bicarbonate 650 MG Tabs  Commonly known as: Sodium Bicarbonate   Take 1 Tablet by mouth in the morning, at noon, and at bedtime.  Dose: 650 mg            CHANGE how you take these medications        Instructions   furosemide 40 MG Tabs  What changed:   medication strength  how much to take  when to take this  reasons to take this  Commonly known as: Lasix   Take 0.5 Tablets by mouth 1 time a day as needed (If noticeable swelling in extremities and abdomen or gained 2lb in 24hrs.).  Dose: 20 mg            CONTINUE taking these medications        Instructions   aspirin EC 81 MG Tbec  Commonly known as: Ecotrin   Take 1 Tablet by mouth every day.  Dose: 81 mg     atorvastatin 80 MG tablet  Commonly known as: Lipitor   Take 1 Tablet by mouth every evening.  Dose: 80 mg     carvedilol 3.125 MG Tabs  Commonly known as: Coreg   Take 1 Tablet by mouth 2 times a day with meals.  Dose: 3.125 mg     cholestyramine 4 g packet  Commonly known as: Questran   Take 1 Packet by mouth 2 times a day. 1000 + 1700  Dose: 1 Packet     enalapril 20 MG tablet  Commonly known as: Vasotec   TAKE ONE-HALF (1/2) TABLET TWICE A DAY  Dose: 10 mg     HumuLIN R 100 Unit/mL Soln  Generic drug: insulin regular   Inject 4-8 Units under the skin 3 times a day before meals. Sliding Scale   150 - 200 = 3 units  201 - 250 = 4 units  251 - 300 = 5 units  301 - 350 = 6 units  351 - 400 = 7 units   PLUS CARB CORRECTION  of 1 unit for every 15 g carb  Dose: 4-8 Units     HYDROcodone-acetaminophen 5-325 MG Tabs per tablet  Commonly known as: Norco   Take 1 Tablet by mouth every four hours as needed (severe pain).  Dose: 1 Tablet     Lucentis 0.3 MG/0.05ML Soln  Generic drug: Ranibizumab   1 Dose by Intravitreal route  see administration instructions. Every 6-8 weeks  Dose: 1 Dose     omeprazole 20 MG delayed-release capsule  Commonly known as: PriLOSEC   Take 1 Capsule by mouth every morning.  Dose: 20 mg     Tresiba FlexTouch 100 UNIT/ML Sopn  Generic drug: Insulin Degludec   Inject 36 Units under the skin every evening. 36 units at night  Dose: 36 Units              Allergies  No Known Allergies    DIET  Renal      ACTIVITY  As tolerated.  Weight bearing as tolerated    CONSULTATIONS  Nephrology    PROCEDURES  N/A    LABORATORY  Lab Results   Component Value Date    SODIUM 136 01/13/2024    POTASSIUM 3.4 (L) 01/13/2024    CHLORIDE 111 01/13/2024    CO2 14 (L) 01/13/2024    GLUCOSE 124 (H) 01/13/2024    BUN 52 (H) 01/13/2024    CREATININE 3.19 (H) 01/13/2024        Lab Results   Component Value Date    WBC 4.4 (L) 01/12/2024    HEMOGLOBIN 8.9 (L) 01/12/2024    HEMATOCRIT 25.8 (L) 01/12/2024    PLATELETCT 144 (L) 01/12/2024        Total time of the discharge process exceeds 36 minutes.

## 2024-01-15 ENCOUNTER — PATIENT OUTREACH (OUTPATIENT)
Dept: MEDICAL GROUP | Facility: PHYSICIAN GROUP | Age: 54
End: 2024-01-15
Payer: MEDICARE

## 2024-01-15 DIAGNOSIS — D64.9 NORMOCYTIC ANEMIA: ICD-10-CM

## 2024-01-15 DIAGNOSIS — N25.81 HYPERPARATHYROIDISM DUE TO RENAL INSUFFICIENCY (HCC): ICD-10-CM

## 2024-01-15 DIAGNOSIS — N18.32 STAGE 3B CHRONIC KIDNEY DISEASE: ICD-10-CM

## 2024-01-15 DIAGNOSIS — Z87.440 HISTORY OF UTI: ICD-10-CM

## 2024-01-15 PROBLEM — N18.9 CKD (CHRONIC KIDNEY DISEASE): Status: RESOLVED | Noted: 2024-01-09 | Resolved: 2024-01-15

## 2024-01-15 NOTE — PROGRESS NOTES
Transitional Care Management  TCM Outreach Date and Time: Filed (1/15/2024 11:18 AM)    Discharge Questions  Actual Discharge Date: 01/13/24  Now that you are home, how are you feeling?: Good  Did you receive any new prescriptions?: Yes (oseltamivir 30 MG Caps; sodium bicarbonate 650mg; change dosing furosemide 40 MG Tabs to take 0.5 tb po 1 time a day)  Were you able to get them filled?: Yes (picked up at [facility])  Meds to Bed or Pharmacy filled?: Pharmacy ([facility])  Do you have any questions about your current medications or new medications (Review Med Rec)?: No (aside from the 2 new Rxs and change to dose of lasix patient taking all meds as prescribed)  Did you have any durable medical equipment ordered?: No  Do you have a follow up appointment scheduled with your PCP?: Yes  Appointment Date: 01/18/24  Appointment Time: 1300  Any issues or paperwork you wish to discuss with your PCP?: No  Are you (patient) able to get to the appointment?: Yes  If Home Health was ordered, have they contacted you (Patient): Not Applicable  Did you have enough support after your last discharge?: Yes  Does this patient qualify for the CCM program?: Yes (Not interested at this time)    Transitional Care  Number of attempts made to contact patient: 1  Current or previous attempts competed within two business days of discharge? : Yes  Provided education regarding treatment plan, medications, self-management, ADLs?: Yes (went over medications and no further questions taking it as prescribed)  Has patient completed an Advanced Directive?: No  Has the Care Manager's phone number provided?: No  Is there anything else I can help you with?: No (Offered to transfer patient to make an appt with [facility] but states he has someone that's doing that for him)    Discharge Summary  Chief Complaint: Syncope; hypotension; worsening dizziness upon standing  Admitting Diagnosis: Syncope  Discharge Diagnosis: Syncope  and collapse

## 2024-01-18 ENCOUNTER — OFFICE VISIT (OUTPATIENT)
Dept: MEDICAL GROUP | Facility: PHYSICIAN GROUP | Age: 54
End: 2024-01-18
Payer: MEDICARE

## 2024-01-18 VITALS
DIASTOLIC BLOOD PRESSURE: 60 MMHG | TEMPERATURE: 99.7 F | HEART RATE: 95 BPM | BODY MASS INDEX: 27.63 KG/M2 | OXYGEN SATURATION: 97 % | HEIGHT: 72 IN | SYSTOLIC BLOOD PRESSURE: 134 MMHG | WEIGHT: 204 LBS

## 2024-01-18 DIAGNOSIS — E11.3599 TYPE 2 DIABETES MELLITUS WITH PROLIFERATIVE RETINOPATHY WITHOUT MACULAR EDEMA, WITHOUT LONG-TERM CURRENT USE OF INSULIN, UNSPECIFIED LATERALITY (HCC): ICD-10-CM

## 2024-01-18 DIAGNOSIS — Z89.431 S/P TRANSMETATARSAL AMPUTATION OF FOOT, RIGHT (HCC): ICD-10-CM

## 2024-01-18 DIAGNOSIS — Z09 HOSPITAL DISCHARGE FOLLOW-UP: ICD-10-CM

## 2024-01-18 DIAGNOSIS — N25.81 HYPERPARATHYROIDISM DUE TO RENAL INSUFFICIENCY (HCC): ICD-10-CM

## 2024-01-18 DIAGNOSIS — I13.0 HYPERTENSIVE HEART AND CHRONIC KIDNEY DISEASE WITH HEART FAILURE AND STAGE 1 THROUGH STAGE 4 CHRONIC KIDNEY DISEASE, OR UNSPECIFIED CHRONIC KIDNEY DISEASE (HCC): ICD-10-CM

## 2024-01-18 PROBLEM — U07.1 COVID-19: Status: RESOLVED | Noted: 2021-08-19 | Resolved: 2024-01-18

## 2024-01-18 PROBLEM — T25.221A PARTIAL THICKNESS BURN OF RIGHT FOOT: Status: RESOLVED | Noted: 2021-07-02 | Resolved: 2024-01-18

## 2024-01-18 PROBLEM — R55 SYNCOPE AND COLLAPSE: Status: RESOLVED | Noted: 2021-08-19 | Resolved: 2024-01-18

## 2024-01-18 PROBLEM — R06.2 WHEEZING: Status: RESOLVED | Noted: 2022-11-20 | Resolved: 2024-01-18

## 2024-01-18 PROBLEM — R79.89 TROPONIN I ABOVE REFERENCE RANGE: Status: RESOLVED | Noted: 2022-11-18 | Resolved: 2024-01-18

## 2024-01-18 PROBLEM — E66.9 OBESITY (BMI 35.0-39.9 WITHOUT COMORBIDITY): Status: RESOLVED | Noted: 2022-11-11 | Resolved: 2024-01-18

## 2024-01-18 PROCEDURE — 3075F SYST BP GE 130 - 139MM HG: CPT | Performed by: FAMILY MEDICINE

## 2024-01-18 PROCEDURE — 3078F DIAST BP <80 MM HG: CPT | Performed by: FAMILY MEDICINE

## 2024-01-18 PROCEDURE — 99496 TRANSJ CARE MGMT HIGH F2F 7D: CPT | Performed by: FAMILY MEDICINE

## 2024-01-18 ASSESSMENT — FIBROSIS 4 INDEX: FIB4 SCORE: 2.22

## 2024-01-18 NOTE — ASSESSMENT & PLAN NOTE
This is a new issue.  Patient was hospitalized from January 9 to 13 for syncopal episode and collapse.  It was hypotension thought to be due to overdiuresis and dehydration.  After he got rehydrated he felt much better and is continued to do well.  They have reduced his Lasix dose.  He just saw his endocrinologist yesterday and state everything was going well.  He has a follow-up with his nephrologist the first week of February as his renal functions also deteriorated during that time.

## 2024-01-18 NOTE — ASSESSMENT & PLAN NOTE
This is a chronic problem.  Patient does follow-up with endocrinology and ophthalmology on a regular basis.

## 2024-01-18 NOTE — PROGRESS NOTES
Subjective:     CC: Here for hospital follow-up.  Patient's PCP was not available.    HPI:   Beni presents today with the following medical concerns:    Hospital discharge follow-up  This is a new issue.  Patient was hospitalized from January 9 to 13 for syncopal episode and collapse.  It was hypotension thought to be due to overdiuresis and dehydration.  After he got rehydrated he felt much better and is continued to do well.  They have reduced his Lasix dose.  He just saw his endocrinologist yesterday and state everything was going well.  He has a follow-up with his nephrologist the first week of February as his renal functions also deteriorated during that time.    Type 2 diabetes mellitus with proliferative retinopathy without macular edema, without long-term current use of insulin, unspecified laterality (HCC)  This is a chronic problem.  Patient does follow-up with endocrinology and ophthalmology on a regular basis.    S/P transmetatarsal amputation of foot, right (HCC)  This is a chronic unchanged condition.    Hypertensive heart and chronic kidney disease with heart failure and stage 1 through stage 4 chronic kidney disease, or unspecified chronic kidney disease (HCC)  This is a chronic problem.  He is followed regularly by cardiology and nephrology.    Hyperparathyroidism due to renal insufficiency ([facility])  This is a chronic problem.  He is followed by nephrology.    Past Medical History:   Diagnosis Date    Anesthesia     Hypotension post-op, persisted for a few months    Anesthesia 12/06/2023    Patient stated after 2016 colonoscopy he became hypotensive.    Bowel habit changes     History GI problems    Cataract     IOL - Left eye    Cataract     IOL OD    Deaf, right     Diabetes (HCC)     Oral medications & insulin    Heart burn     GERD; takes Prilosec    Hemorrhagic disorder ([facility])     ASA protection for stent and cardiac history    History of non-ST elevation myocardial infarction (NSTEMI)     Per  Problem List    Hx of heart artery stent     Hyperlipidemia     Hyperparathyroidism due to renal insufficiency (HCC)     Per Problem List    Hypertension     Myocardial infarct (HCC)     Pneumonia     H/O    Renal disorder 2023    Stage 3 CKD       Social History     Tobacco Use    Smoking status: Former     Current packs/day: 0.00     Average packs/day: 0.5 packs/day for 10.0 years (5.0 ttl pk-yrs)     Types: Cigarettes     Start date:      Quit date:      Years since quittin.0    Smokeless tobacco: Never   Vaping Use    Vaping Use: Never used   Substance Use Topics    Alcohol use: No    Drug use: Not Currently     Types: Marijuana, Oral     Comment: CBD Edibles 3-4 times per week       Current Outpatient Medications Ordered in Epic   Medication Sig Dispense Refill    sodium bicarbonate (SODIUM BICARBONATE) 650 MG Tab Take 1 Tablet by mouth in the morning, at noon, and at bedtime. 120 Tablet 0    furosemide (LASIX) 40 MG Tab Take 0.5 Tablets by mouth 1 time a day as needed (If noticeable swelling in extremities and abdomen or gained 2lb in 24hrs.). 30 Tablet 0    HYDROcodone-acetaminophen (NORCO) 5-325 MG Tab per tablet Take 1 Tablet by mouth every four hours as needed (severe pain).      enalapril (VASOTEC) 20 MG tablet TAKE ONE-HALF (1/2) TABLET TWICE A DAY 90 Tablet 3    HUMULIN R 100 UNIT/ML Solution Inject 4-8 Units under the skin 3 times a day before meals. Sliding Scale   150 - 200 = 3 units  201 - 250 = 4 units  251 - 300 = 5 units  301 - 350 = 6 units  351 - 400 = 7 units   PLUS CARB CORRECTION  of 1 unit for every 15 g carb      Ranibizumab (LUCENTIS) 0.3 MG/0.05ML Solution 1 Dose by Intravitreal route see administration instructions. Every 6-8 weeks      atorvastatin (LIPITOR) 80 MG tablet Take 1 Tablet by mouth every evening. 100 Tablet 4    Insulin Degludec (TRESIBA FLEXTOUCH) 100 UNIT/ML Solution Pen-injector Inject 36 Units under the skin every evening. 36 units at night       aspirin EC (ECOTRIN) 81 MG Tablet Delayed Response Take 1 Tablet by mouth every day. 90 Tablet 2    carvedilol (COREG) 3.125 MG Tab Take 1 Tablet by mouth 2 times a day with meals. 180 Tablet 3    cholestyramine (QUESTRAN) 4 g packet Take 1 Packet by mouth 2 times a day. 1000 + 1700      omeprazole (PRILOSEC) 20 MG delayed-release capsule Take 1 Capsule by mouth every morning.       No current [facility]-ordered facility-administered medications on file.       Allergies:  Patient has no known allergies.    Health Maintenance: Completed    ROS:  Gen: no fevers/chills, no changes in weight  Eyes: no changes in vision  ENT: no sore throat, no hearing loss, no bloody nose  Pulm: no sob, no cough  CV: no chest pain, no palpitations  GI: no nausea/vomiting, no diarrhea  : no dysuria  MSk: no myalgias  Skin: no rash  Neuro: no headaches, no numbness/tingling  Heme/Lymph: no easy bruising      Objective:       Exam:  /60   Pulse 95   Temp 37.6 °C (99.7 °F) (Temporal)   Ht 1.829 m (6')   Wt 92.5 kg (204 lb)   SpO2 97%   BMI 27.67 kg/m²  Body mass index is 27.67 kg/m².    Gen: Alert and oriented, No apparent distress.  Neck: Neck is supple without lymphadenopathy.  Lungs: Normal effort, CTA bilaterally, no wheezes, rhonchi, or rales  CV: Regular rate and rhythm. No murmurs, rubs, or gallops.  Ext: No clubbing, cyanosis, edema.      Labs: Recent labs done to the hospital reviewed.    Assessment & Plan:     53 y.o. male with the following -     1. Type 2 diabetes mellitus with proliferative retinopathy without macular edema, without long-term current use of insulin, unspecified laterality (HCC)  This is a chronic problem.  Continue care through his endocrinologist and ophthalmologist.    2. Hypertensive heart and chronic kidney disease with heart failure and stage 1 through stage 4 chronic kidney disease, or unspecified chronic kidney disease (HCC)  This is a chronic problem.  Continue care through his cardiologist and  nephrologist.    3. S/P transmetatarsal amputation of foot, right (HCC)  This is a chronic unchanged condition.    4. Hyperparathyroidism due to renal insufficiency (HCC)  This is a chronic problem.  Continue care through nephrology.    5. Hospital discharge follow-up  Patient appears to be doing well after his hospitalization.  His blood pressure is back up to normal.  He is to continue on the lower dose of the Lasix and follow-up with his nephrologist as planned.  He knows he has to do his labs before that visit as well.  Follow-up with his PCP in about 4 months.    HCC Gap Form    Diagnosis: Z89.431 - S/P transmetatarsal amputation of foot, right (HCC)  Assessment and plan: Chronic, stable. Continue with current defined treatment plan: Unchanged. Follow-up at least annually.  Diagnosis: N25.81 - Hyperparathyroidism due to renal insufficiency (HCC)  Assessment and plan: Chronic, stable, as based on today's assessment and impact on other conditions evaluated today. Continue with current treatment plan: Followed by nephrology. follow-up with specialist as directed, but at least annually.  Diagnosis: E11.3599 - Type 2 diabetes mellitus with proliferative diabetic retinopathy without macular edema, unspecified eye ([facility])  Assessment and plan: Chronic, stable, as based on today's assessment and impact on other conditions evaluated today. Continue with current treatment plan: Followed by endocrinology and ophthalmology.  Follow-up with specialist as directed, but at least annually.  Diagnosis: I13.0 - Hypertensive heart and chronic kidney disease with heart failure and stage 1 through stage 4 chronic kidney disease, or unspecified chronic kidney disease (HCC)  Assessment and plan: Chronic, stable, as based on today's assessment and impact on other conditions evaluated today. Continue with current treatment plan: Followed by cardiology and nephrology.  Follow-up with specialist as directed, but at least annually.  Last  edited 01/18/24 13:23 PST by Geoffrey Pascual III, M.D.         Return in about 4 months (around 5/18/2024) for Long.    Please note that this dictation was created using voice recognition software. I have made every reasonable attempt to correct obvious errors, but I expect that there are errors of grammar and possibly content that I did not discover before finalizing the note.

## 2024-01-25 ENCOUNTER — APPOINTMENT (OUTPATIENT)
Dept: RADIOLOGY | Facility: MEDICAL CENTER | Age: 54
DRG: 853 | End: 2024-01-25
Attending: EMERGENCY MEDICINE
Payer: MEDICARE

## 2024-01-25 ENCOUNTER — HOSPITAL ENCOUNTER (INPATIENT)
Facility: MEDICAL CENTER | Age: 54
LOS: 14 days | DRG: 853 | End: 2024-02-08
Attending: EMERGENCY MEDICINE | Admitting: INTERNAL MEDICINE
Payer: MEDICARE

## 2024-01-25 DIAGNOSIS — L08.9 SOFT TISSUE INFECTION OF FOOT: ICD-10-CM

## 2024-01-25 DIAGNOSIS — N17.9 AKI (ACUTE KIDNEY INJURY) (HCC): ICD-10-CM

## 2024-01-25 DIAGNOSIS — E11.628 DIABETIC FOOT INFECTION (HCC): ICD-10-CM

## 2024-01-25 DIAGNOSIS — L08.9 DIABETIC FOOT INFECTION (HCC): ICD-10-CM

## 2024-01-25 DIAGNOSIS — M86.172 OTHER ACUTE OSTEOMYELITIS OF LEFT FOOT (HCC): ICD-10-CM

## 2024-01-25 DIAGNOSIS — E83.51 HYPOCALCEMIA: ICD-10-CM

## 2024-01-25 DIAGNOSIS — R65.21 SEPTIC SHOCK (HCC): ICD-10-CM

## 2024-01-25 DIAGNOSIS — Z89.431 S/P TRANSMETATARSAL AMPUTATION OF FOOT, RIGHT (HCC): ICD-10-CM

## 2024-01-25 DIAGNOSIS — A41.9 SEPTIC SHOCK (HCC): ICD-10-CM

## 2024-01-25 PROBLEM — S92.912B: Status: ACTIVE | Noted: 2024-01-25

## 2024-01-25 LAB
ALBUMIN SERPL BCP-MCNC: 2 G/DL (ref 3.2–4.9)
ALBUMIN/GLOB SERPL: 0.5 G/DL
ALP SERPL-CCNC: 72 U/L (ref 30–99)
ALT SERPL-CCNC: 10 U/L (ref 2–50)
ANION GAP SERPL CALC-SCNC: 16 MMOL/L (ref 7–16)
AST SERPL-CCNC: 19 U/L (ref 12–45)
BASOPHILS # BLD AUTO: 0.2 % (ref 0–1.8)
BASOPHILS # BLD: 0.05 K/UL (ref 0–0.12)
BILIRUB SERPL-MCNC: 0.5 MG/DL (ref 0.1–1.5)
BUN SERPL-MCNC: 72 MG/DL (ref 8–22)
CALCIUM ALBUM COR SERPL-MCNC: 8.9 MG/DL (ref 8.5–10.5)
CALCIUM SERPL-MCNC: 7.3 MG/DL (ref 8.5–10.5)
CHLORIDE SERPL-SCNC: 104 MMOL/L (ref 96–112)
CO2 SERPL-SCNC: 11 MMOL/L (ref 20–33)
CREAT SERPL-MCNC: 6.36 MG/DL (ref 0.5–1.4)
EKG IMPRESSION: NORMAL
EOSINOPHIL # BLD AUTO: 0.03 K/UL (ref 0–0.51)
EOSINOPHIL NFR BLD: 0.1 % (ref 0–6.9)
ERYTHROCYTE [DISTWIDTH] IN BLOOD BY AUTOMATED COUNT: 45.8 FL (ref 35.9–50)
GFR SERPLBLD CREATININE-BSD FMLA CKD-EPI: 10 ML/MIN/1.73 M 2
GLOBULIN SER CALC-MCNC: 3.7 G/DL (ref 1.9–3.5)
GLUCOSE BLD STRIP.AUTO-MCNC: 169 MG/DL (ref 65–99)
GLUCOSE SERPL-MCNC: 186 MG/DL (ref 65–99)
HCT VFR BLD AUTO: 25.7 % (ref 42–52)
HGB BLD-MCNC: 8.5 G/DL (ref 14–18)
IMM GRANULOCYTES # BLD AUTO: 0.39 K/UL (ref 0–0.11)
IMM GRANULOCYTES NFR BLD AUTO: 1.6 % (ref 0–0.9)
INR PPP: 1.2 (ref 0.87–1.13)
LACTATE SERPL-SCNC: 1.1 MMOL/L (ref 0.5–2)
LYMPHOCYTES # BLD AUTO: 1.28 K/UL (ref 1–4.8)
LYMPHOCYTES NFR BLD: 5.2 % (ref 22–41)
MCH RBC QN AUTO: 28.9 PG (ref 27–33)
MCHC RBC AUTO-ENTMCNC: 33.1 G/DL (ref 32.3–36.5)
MCV RBC AUTO: 87.4 FL (ref 81.4–97.8)
MONOCYTES # BLD AUTO: 1.36 K/UL (ref 0–0.85)
MONOCYTES NFR BLD AUTO: 5.5 % (ref 0–13.4)
NEUTROPHILS # BLD AUTO: 21.55 K/UL (ref 1.82–7.42)
NEUTROPHILS NFR BLD: 87.4 % (ref 44–72)
NRBC # BLD AUTO: 0 K/UL
NRBC BLD-RTO: 0 /100 WBC (ref 0–0.2)
PLATELET # BLD AUTO: 320 K/UL (ref 164–446)
PMV BLD AUTO: 9.9 FL (ref 9–12.9)
POTASSIUM SERPL-SCNC: 4.7 MMOL/L (ref 3.6–5.5)
PROT SERPL-MCNC: 5.7 G/DL (ref 6–8.2)
PROTHROMBIN TIME: 15.4 SEC (ref 12–14.6)
RBC # BLD AUTO: 2.94 M/UL (ref 4.7–6.1)
SODIUM SERPL-SCNC: 131 MMOL/L (ref 135–145)
TROPONIN T SERPL-MCNC: 68 NG/L (ref 6–19)
WBC # BLD AUTO: 24.7 K/UL (ref 4.8–10.8)

## 2024-01-25 PROCEDURE — 73700 CT LOWER EXTREMITY W/O DYE: CPT | Mod: LT

## 2024-01-25 PROCEDURE — 700105 HCHG RX REV CODE 258: Performed by: EMERGENCY MEDICINE

## 2024-01-25 PROCEDURE — 36415 COLL VENOUS BLD VENIPUNCTURE: CPT

## 2024-01-25 PROCEDURE — 84484 ASSAY OF TROPONIN QUANT: CPT

## 2024-01-25 PROCEDURE — 99291 CRITICAL CARE FIRST HOUR: CPT

## 2024-01-25 PROCEDURE — A9270 NON-COVERED ITEM OR SERVICE: HCPCS | Performed by: INTERNAL MEDICINE

## 2024-01-25 PROCEDURE — 93005 ELECTROCARDIOGRAM TRACING: CPT

## 2024-01-25 PROCEDURE — 93005 ELECTROCARDIOGRAM TRACING: CPT | Performed by: EMERGENCY MEDICINE

## 2024-01-25 PROCEDURE — 71045 X-RAY EXAM CHEST 1 VIEW: CPT

## 2024-01-25 PROCEDURE — 700102 HCHG RX REV CODE 250 W/ 637 OVERRIDE(OP): Performed by: INTERNAL MEDICINE

## 2024-01-25 PROCEDURE — 700111 HCHG RX REV CODE 636 W/ 250 OVERRIDE (IP): Mod: JZ | Performed by: INTERNAL MEDICINE

## 2024-01-25 PROCEDURE — 85610 PROTHROMBIN TIME: CPT

## 2024-01-25 PROCEDURE — 85025 COMPLETE CBC W/AUTO DIFF WBC: CPT

## 2024-01-25 PROCEDURE — 73630 X-RAY EXAM OF FOOT: CPT | Mod: LT

## 2024-01-25 PROCEDURE — 700105 HCHG RX REV CODE 258: Performed by: INTERNAL MEDICINE

## 2024-01-25 PROCEDURE — 82962 GLUCOSE BLOOD TEST: CPT

## 2024-01-25 PROCEDURE — 99291 CRITICAL CARE FIRST HOUR: CPT | Performed by: INTERNAL MEDICINE

## 2024-01-25 PROCEDURE — 87641 MR-STAPH DNA AMP PROBE: CPT

## 2024-01-25 PROCEDURE — 96365 THER/PROPH/DIAG IV INF INIT: CPT

## 2024-01-25 PROCEDURE — 83605 ASSAY OF LACTIC ACID: CPT

## 2024-01-25 PROCEDURE — 87040 BLOOD CULTURE FOR BACTERIA: CPT | Mod: 91

## 2024-01-25 PROCEDURE — 770022 HCHG ROOM/CARE - ICU (200)

## 2024-01-25 PROCEDURE — 80053 COMPREHEN METABOLIC PANEL: CPT

## 2024-01-25 RX ORDER — SODIUM CHLORIDE, SODIUM LACTATE, POTASSIUM CHLORIDE, AND CALCIUM CHLORIDE .6; .31; .03; .02 G/100ML; G/100ML; G/100ML; G/100ML
500 INJECTION, SOLUTION INTRAVENOUS
Status: DISCONTINUED | OUTPATIENT
Start: 2024-01-25 | End: 2024-02-08 | Stop reason: HOSPADM

## 2024-01-25 RX ORDER — NOREPINEPHRINE BITARTRATE 0.03 MG/ML
0-1 INJECTION, SOLUTION INTRAVENOUS CONTINUOUS
Status: DISCONTINUED | OUTPATIENT
Start: 2024-01-25 | End: 2024-01-27

## 2024-01-25 RX ORDER — PROMETHAZINE HYDROCHLORIDE 25 MG/1
12.5-25 TABLET ORAL EVERY 4 HOURS PRN
Status: DISCONTINUED | OUTPATIENT
Start: 2024-01-25 | End: 2024-02-08 | Stop reason: HOSPADM

## 2024-01-25 RX ORDER — ATORVASTATIN CALCIUM 40 MG/1
80 TABLET, FILM COATED ORAL EVERY EVENING
Status: DISCONTINUED | OUTPATIENT
Start: 2024-01-25 | End: 2024-02-08 | Stop reason: HOSPADM

## 2024-01-25 RX ORDER — CHOLESTYRAMINE LIGHT 4 G/5.7G
1 POWDER, FOR SUSPENSION ORAL 2 TIMES DAILY
Status: DISCONTINUED | OUTPATIENT
Start: 2024-01-26 | End: 2024-01-25

## 2024-01-25 RX ORDER — CHOLESTYRAMINE LIGHT 4 G/5.7G
1 POWDER, FOR SUSPENSION ORAL 2 TIMES DAILY
Status: DISCONTINUED | OUTPATIENT
Start: 2024-01-25 | End: 2024-02-08 | Stop reason: HOSPADM

## 2024-01-25 RX ORDER — PROCHLORPERAZINE EDISYLATE 5 MG/ML
5-10 INJECTION INTRAMUSCULAR; INTRAVENOUS EVERY 4 HOURS PRN
Status: DISCONTINUED | OUTPATIENT
Start: 2024-01-25 | End: 2024-02-08 | Stop reason: HOSPADM

## 2024-01-25 RX ORDER — SODIUM CHLORIDE 9 MG/ML
30 INJECTION, SOLUTION INTRAVENOUS ONCE
Status: COMPLETED | OUTPATIENT
Start: 2024-01-25 | End: 2024-01-25

## 2024-01-25 RX ORDER — POLYETHYLENE GLYCOL 3350 17 G/17G
1 POWDER, FOR SOLUTION ORAL
Status: DISCONTINUED | OUTPATIENT
Start: 2024-01-25 | End: 2024-02-08 | Stop reason: HOSPADM

## 2024-01-25 RX ORDER — OMEPRAZOLE 20 MG/1
20 CAPSULE, DELAYED RELEASE ORAL EVERY MORNING
Status: DISCONTINUED | OUTPATIENT
Start: 2024-01-26 | End: 2024-02-08 | Stop reason: HOSPADM

## 2024-01-25 RX ORDER — AMOXICILLIN 250 MG
2 CAPSULE ORAL 2 TIMES DAILY
Status: DISCONTINUED | OUTPATIENT
Start: 2024-01-25 | End: 2024-02-08 | Stop reason: HOSPADM

## 2024-01-25 RX ORDER — SODIUM BICARBONATE 650 MG/1
650 TABLET ORAL 3 TIMES DAILY
Status: DISCONTINUED | OUTPATIENT
Start: 2024-01-25 | End: 2024-01-26

## 2024-01-25 RX ORDER — PROMETHAZINE HYDROCHLORIDE 25 MG/1
12.5-25 SUPPOSITORY RECTAL EVERY 4 HOURS PRN
Status: DISCONTINUED | OUTPATIENT
Start: 2024-01-25 | End: 2024-02-08 | Stop reason: HOSPADM

## 2024-01-25 RX ORDER — ACETAMINOPHEN 325 MG/1
650 TABLET ORAL EVERY 6 HOURS PRN
Status: DISCONTINUED | OUTPATIENT
Start: 2024-01-25 | End: 2024-02-01

## 2024-01-25 RX ORDER — BISACODYL 10 MG
10 SUPPOSITORY, RECTAL RECTAL
Status: DISCONTINUED | OUTPATIENT
Start: 2024-01-25 | End: 2024-02-08 | Stop reason: HOSPADM

## 2024-01-25 RX ORDER — ASPIRIN 81 MG/1
81 TABLET ORAL DAILY
Status: DISCONTINUED | OUTPATIENT
Start: 2024-01-26 | End: 2024-02-08 | Stop reason: HOSPADM

## 2024-01-25 RX ORDER — ONDANSETRON 4 MG/1
4 TABLET, ORALLY DISINTEGRATING ORAL EVERY 4 HOURS PRN
Status: DISCONTINUED | OUTPATIENT
Start: 2024-01-25 | End: 2024-02-08 | Stop reason: HOSPADM

## 2024-01-25 RX ORDER — LINEZOLID 2 MG/ML
600 INJECTION, SOLUTION INTRAVENOUS EVERY 12 HOURS
Status: DISCONTINUED | OUTPATIENT
Start: 2024-01-25 | End: 2024-01-31

## 2024-01-25 RX ORDER — ONDANSETRON 2 MG/ML
4 INJECTION INTRAMUSCULAR; INTRAVENOUS EVERY 4 HOURS PRN
Status: DISCONTINUED | OUTPATIENT
Start: 2024-01-25 | End: 2024-02-08 | Stop reason: HOSPADM

## 2024-01-25 RX ORDER — DEXTROSE MONOHYDRATE 25 G/50ML
25 INJECTION, SOLUTION INTRAVENOUS
Status: DISCONTINUED | OUTPATIENT
Start: 2024-01-25 | End: 2024-01-26

## 2024-01-25 RX ORDER — OSELTAMIVIR PHOSPHATE 30 MG/1
30 CAPSULE ORAL
Status: ON HOLD | COMMUNITY
Start: 2024-01-13 | End: 2024-02-05

## 2024-01-25 RX ORDER — HEPARIN SODIUM 5000 [USP'U]/ML
5000 INJECTION, SOLUTION INTRAVENOUS; SUBCUTANEOUS EVERY 8 HOURS
Status: COMPLETED | OUTPATIENT
Start: 2024-01-25 | End: 2024-01-26

## 2024-01-25 RX ADMIN — INSULIN GLARGINE-YFGN 36 UNITS: 100 INJECTION, SOLUTION SUBCUTANEOUS at 19:16

## 2024-01-25 RX ADMIN — LINEZOLID 600 MG: 600 INJECTION, SOLUTION INTRAVENOUS at 18:36

## 2024-01-25 RX ADMIN — INSULIN HUMAN 3 UNITS: 100 INJECTION, SOLUTION PARENTERAL at 19:12

## 2024-01-25 RX ADMIN — SODIUM BICARBONATE 650 MG: 650 TABLET ORAL at 20:33

## 2024-01-25 RX ADMIN — SODIUM CHLORIDE 2655 ML: 9 INJECTION, SOLUTION INTRAVENOUS at 16:35

## 2024-01-25 RX ADMIN — CHOLESTYRAMINE 4 G: 4 POWDER, FOR SUSPENSION ORAL at 20:33

## 2024-01-25 RX ADMIN — PIPERACILLIN AND TAZOBACTAM 4.5 G: 4; .5 INJECTION, POWDER, FOR SOLUTION INTRAVENOUS at 17:30

## 2024-01-25 RX ADMIN — PIPERACILLIN AND TAZOBACTAM 4.5 G: 4; .5 INJECTION, POWDER, FOR SOLUTION INTRAVENOUS at 18:42

## 2024-01-25 RX ADMIN — ATORVASTATIN CALCIUM 80 MG: 40 TABLET, FILM COATED ORAL at 18:48

## 2024-01-25 ASSESSMENT — ENCOUNTER SYMPTOMS
NAUSEA: 0
ABDOMINAL PAIN: 0
FOCAL WEAKNESS: 0
CHILLS: 0
MYALGIAS: 0
FEVER: 0
COUGH: 0
SENSORY CHANGE: 1
SPUTUM PRODUCTION: 0
BLURRED VISION: 0
WEAKNESS: 1
DIZZINESS: 0
STRIDOR: 0
ROS SKIN COMMENTS: LLE WOUND
VOMITING: 0
SHORTNESS OF BREATH: 0

## 2024-01-25 ASSESSMENT — PAIN DESCRIPTION - PAIN TYPE: TYPE: ACUTE PAIN

## 2024-01-25 ASSESSMENT — LIFESTYLE VARIABLES: DO YOU DRINK ALCOHOL: NO

## 2024-01-25 ASSESSMENT — FIBROSIS 4 INDEX: FIB4 SCORE: 2.22

## 2024-01-25 NOTE — ED NOTES
Pt BIB EMS from home in [place].  Pt reports Ulcer to Left foot for about a wk.  Pt seen here multiple times for wound to left pinky toe and has been seen by wound care.  Pt reports last seen 2 wks ago.  Pt has h/o DM and neuropathy.  Pt BP lower at 82/50 on EMS arrival to pt home.  Pt BP 89/55 on arrival to ER.  Pt rec'd 600 ml of NS by EMS PTA.  .  ERP to see.

## 2024-01-25 NOTE — ED PROVIDER NOTES
ED Provider Note    Primary care provider: Florinda Pascual M.D.    CHIEF COMPLAINT  Chief Complaint   Patient presents with    Wound Check    Blood Pressure Problem       HPI  Beni Moore is a 53 y.o. male who presents to the Emergency Department for a foot infection.  He does not feel his feet secondary to diabetic neuropathy.  He went to have some lab work done today, looked down at his socks and noted blood on his left fifth toe, he would looked underneath the foot and realized that there is a ulcer there as well.  He believes this likely presented over the past 2 days.  Unaware of any fevers.      External Record Review: Patient hospitalized 1/9 through 1/13 for syncope.  He was found to be hypotensive, acute on chronic renal insufficiency, tested positive also for influenza.    REVIEW OF SYSTEMS  See HPI.     PAST MEDICAL HISTORY   has a past medical history of Anesthesia, Anesthesia (12/06/2023), Bowel habit changes, Cataract, Cataract, Deaf, right, Diabetes (HCC), Heart burn, Hemorrhagic disorder (HCC), History of non-ST elevation myocardial infarction (NSTEMI), heart artery stent (2019), Hyperlipidemia, Hyperparathyroidism due to renal insufficiency (HCC), Hypertension, Myocardial infarct (HCC), Pneumonia, and Renal disorder (12/06/2023).    SURGICAL HISTORY   has a past surgical history that includes other orthopedic surgery; toe amputation (Right, 07/02/2018); achilles tendon repair (Right, 07/02/2018); irrigation & debridement ortho (Right, 07/02/2018); vasectomy; tonsillectomy; other abdominal surgery; stent placement (2019); toe amputation (Left, 09/30/2021); other (Left); cataract extraction with iol (Right, 2023); open treatment prox humeral fracture (Left, 12/11/2023); and repair biceps long tendon (Left, 12/11/2023).    SOCIAL HISTORY  Social History     Tobacco Use    Smoking status: Former     Current packs/day: 0.00     Average packs/day: 0.5 packs/day for 10.0 years (5.0 ttl pk-yrs)      Types: Cigarettes     Start date:      Quit date:      Years since quittin.0    Smokeless tobacco: Never   Vaping Use    Vaping Use: Never used   Substance Use Topics    Alcohol use: No    Drug use: Not Currently     Types: Marijuana, Oral     Comment: CBD Edibles 3-4 times per week      Social History     Substance and Sexual Activity   Drug Use Not Currently    Types: Marijuana, Oral    Comment: CBD Edibles 3-4 times per week       FAMILY HISTORY  Family History   Problem Relation Age of Onset    No Known Problems Mother     Diabetes Father     Heart Disease Father     Diabetes Maternal Grandmother     Heart Disease Maternal Grandfather     Lung Disease Paternal Grandmother     Cancer Paternal Grandmother     Cancer Paternal Grandfather     Lung Cancer Paternal Grandfather     Kidney Disease Neg Hx        CURRENT MEDICATIONS  Reviewed.  See Encounter Summary.     ALLERGIES  No Known Allergies    PHYSICAL EXAM  VITAL SIGNS: BP 95/52   Pulse 72   Temp 36.7 °C (98 °F) (Temporal)   Resp 17   Ht 1.829 m (6')   Wt 88.5 kg (195 lb)   SpO2 99%   BMI 26.45 kg/m²   Constitutional: Awake, alert in no apparent distress.  HENT: Normocephalic, Bilateral external ears normal. Nose normal.   Eyes: Conjunctiva normal, non-icteric, EOMI.    Thorax & Lungs: Easy unlabored respirations, Clear to ascultation bilaterally.  Cardiovascular: Regular rate, Regular rhythm, No murmurs, rubs or gallops. Bilateral pulses symmetrical.   Abdomen:  Soft, nontender, nondistended, normal active bowel sounds.   :    Skin: Visualized skin is  Dry, No erythema, No rash.   Musculoskeletal: Left pedal edema, the right foot is status post transmetatarsal amputation.  There is erythema on the dorsum of the left foot, also a hemorrhagic blister on the sole of the foot, edema of the fifth toe, foul smell to the entire room.    Neurologic: Alert, Grossly non-focal.   Psychiatric: Normal affect, Normal mood  Lymphatic:      EKG   12 lead  Interpreted by me  Rhythm:  Normal sinus rhythm   Rate: 67  Axis: normal  Ectopy: none  Conduction: normal  ST Segments: no acute change  T Waves: no acute change  Clinical Impression: Normal EKG without acute changes       RADIOLOGY  I have independently interpreted the diagnostic imaging associated with this visit and am waiting the final reading from the radiologist.   My preliminary interpretation is as follows: Appears to be some bony destruction of the fifth toe.  No gas.    Radiologist interpretation:   CT-FOOT W/O PLUS RECONS LEFT   Final Result      1.  Findings are consistent with osteomyelitis and pathologic fracture involving the proximal phalanx of the fifth digit.      2.  Soft tissue swelling and emphysema is present around the fifth digit and in the plantar soft tissues of the foot. These findings are consistent with cellulitis.      DX-FOOT-COMPLETE 3+ LEFT   Final Result      1.  Comminuted fracture proximal phalanx fifth digit is identified which could be pathologic and related to osteomyelitis.      2.  Soft tissue swelling and emphysema in the fifth digit which could be due to cellulitis.      DX-CHEST-PORTABLE (1 VIEW)   Final Result         No acute cardiac or pulmonary abnormality is identified.          COURSE & MEDICAL DECISION MAKING  Pertinent Labs & Imaging studies reviewed. (See chart for details)    COURSE & MEDICAL DECISION MAKING  Pertinent Labs & Imaging studies reviewed. (See chart for details)    Differential diagnoses include but are not limited to: Cellulitis, osteomyelitis, necrotizing fasciitis.    3:25 PM - Nursing notes reviewed, patient seen and examined at bedside.    3:40 PM I have high concern for necrotizing fasciitis, x-ray of the foot ordered, broad-spectrum antibiotics ordered, sepsis protocol ordered.  Will discuss with orthopedics following x-ray of the foot.    4:20 PM-reviewed the x-ray, no gas noted.  Discussed the case with Dr. Segura, orthopedic surgeon.   Recommends CT, he will follow along to see if the patient needs emergent amputation.    4:45 PM blood pressure has improved.  Case discussed with Dr. Romano who will evaluate the patient for ICU admission.    Discussion of management with other medical personnel: Intensivist, orthopedic surgeon, clinical pharmacist    Decision tools and prescription drugs considered including, but not limited to: LRINEC score 9    Decision Making:  This is a pleasant 53 y.o. year old male who presents with very concerning infection of the left foot.  The patient is hemodynamically unstable, received a 30 cc/kg fluid bolus, broad-spectrum antibiotics, will require IV pressors.  I obtained a x-ray and CT of the foot.  There is no gas, there is bony destruction consistent with osteomyelitis and cellulitis.  Case discussed with orthopedics who is following along.  At this time the patient may be amenable to antibiotics, nonemergent debridement, he likely will require transmetatarsal amputation but at this time there is no compelling evidence of necrotizing fasciitis and may be able to avoid a BKA.    CRITICAL CARE  The very real possibilty of a deterioration of this patient's condition required the highest level of my preparedness for sudden, emergent intervention.  I provided critical care services, which included medication orders, frequent reevaluations of the patient's condition and response to treatment, ordering and reviewing test results, and discussing the case with various consultants.  The critical care time associated with the care of the patient was 45 minutes. Review chart for interventions. This time is exclusive of any other billable procedures.         FINAL IMPRESSION  1. Septic shock (HCC)    2. Diabetic foot infection (HCC)    3. AXEL (acute kidney injury) (HCC)

## 2024-01-26 ENCOUNTER — TELEPHONE (OUTPATIENT)
Dept: NEPHROLOGY | Facility: MEDICAL CENTER | Age: 54
End: 2024-01-26
Payer: MEDICARE

## 2024-01-26 ENCOUNTER — APPOINTMENT (OUTPATIENT)
Dept: RADIOLOGY | Facility: MEDICAL CENTER | Age: 54
DRG: 853 | End: 2024-01-26
Attending: INTERNAL MEDICINE
Payer: MEDICARE

## 2024-01-26 LAB
ALBUMIN SERPL BCP-MCNC: 1.7 G/DL (ref 3.2–4.9)
ALBUMIN/GLOB SERPL: 0.5 G/DL
ALP SERPL-CCNC: 61 U/L (ref 30–99)
ALT SERPL-CCNC: 10 U/L (ref 2–50)
ANION GAP SERPL CALC-SCNC: 14 MMOL/L (ref 7–16)
ANION GAP SERPL CALC-SCNC: 16 MMOL/L (ref 7–16)
AST SERPL-CCNC: 21 U/L (ref 12–45)
B-OH-BUTYR SERPL-MCNC: 0.31 MMOL/L (ref 0.02–0.27)
BASOPHILS # BLD AUTO: 0.3 % (ref 0–1.8)
BASOPHILS # BLD: 0.05 K/UL (ref 0–0.12)
BILIRUB SERPL-MCNC: 0.4 MG/DL (ref 0.1–1.5)
BUN SERPL-MCNC: 70 MG/DL (ref 8–22)
BUN SERPL-MCNC: 80 MG/DL (ref 8–22)
CA-I SERPL-SCNC: 1 MMOL/L (ref 1.1–1.3)
CALCIUM ALBUM COR SERPL-MCNC: 8.6 MG/DL (ref 8.5–10.5)
CALCIUM SERPL-MCNC: 6.8 MG/DL (ref 8.5–10.5)
CALCIUM SERPL-MCNC: 6.8 MG/DL (ref 8.5–10.5)
CHLORIDE SERPL-SCNC: 105 MMOL/L (ref 96–112)
CHLORIDE SERPL-SCNC: 107 MMOL/L (ref 96–112)
CK SERPL-CCNC: 237 U/L (ref 0–154)
CO2 SERPL-SCNC: 10 MMOL/L (ref 20–33)
CO2 SERPL-SCNC: 11 MMOL/L (ref 20–33)
CREAT SERPL-MCNC: 6.28 MG/DL (ref 0.5–1.4)
CREAT SERPL-MCNC: 6.42 MG/DL (ref 0.5–1.4)
EOSINOPHIL # BLD AUTO: 0.04 K/UL (ref 0–0.51)
EOSINOPHIL NFR BLD: 0.2 % (ref 0–6.9)
ERYTHROCYTE [DISTWIDTH] IN BLOOD BY AUTOMATED COUNT: 48.8 FL (ref 35.9–50)
GFR SERPLBLD CREATININE-BSD FMLA CKD-EPI: 10 ML/MIN/1.73 M 2
GFR SERPLBLD CREATININE-BSD FMLA CKD-EPI: 10 ML/MIN/1.73 M 2
GLOBULIN SER CALC-MCNC: 3.1 G/DL (ref 1.9–3.5)
GLUCOSE BLD STRIP.AUTO-MCNC: 127 MG/DL (ref 65–99)
GLUCOSE BLD STRIP.AUTO-MCNC: 128 MG/DL (ref 65–99)
GLUCOSE BLD STRIP.AUTO-MCNC: 164 MG/DL (ref 65–99)
GLUCOSE BLD STRIP.AUTO-MCNC: 74 MG/DL (ref 65–99)
GLUCOSE BLD STRIP.AUTO-MCNC: 85 MG/DL (ref 65–99)
GLUCOSE BLD STRIP.AUTO-MCNC: 91 MG/DL (ref 65–99)
GLUCOSE SERPL-MCNC: 100 MG/DL (ref 65–99)
GLUCOSE SERPL-MCNC: 123 MG/DL (ref 65–99)
HAV IGM SERPL QL IA: NORMAL
HBV CORE IGM SER QL: NORMAL
HBV SURFACE AB SERPL IA-ACNC: <3.5 MIU/ML (ref 0–10)
HBV SURFACE AG SER QL: NORMAL
HCT VFR BLD AUTO: 25.3 % (ref 42–52)
HCV AB SER QL: NORMAL
HGB BLD-MCNC: 7.7 G/DL (ref 14–18)
IMM GRANULOCYTES # BLD AUTO: 0.33 K/UL (ref 0–0.11)
IMM GRANULOCYTES NFR BLD AUTO: 1.7 % (ref 0–0.9)
LACTATE SERPL-SCNC: 1 MMOL/L (ref 0.5–2)
LYMPHOCYTES # BLD AUTO: 0.97 K/UL (ref 1–4.8)
LYMPHOCYTES NFR BLD: 5 % (ref 22–41)
MAGNESIUM SERPL-MCNC: 1.5 MG/DL (ref 1.5–2.5)
MCH RBC QN AUTO: 28.5 PG (ref 27–33)
MCHC RBC AUTO-ENTMCNC: 30.4 G/DL (ref 32.3–36.5)
MCV RBC AUTO: 93.7 FL (ref 81.4–97.8)
MONOCYTES # BLD AUTO: 1 K/UL (ref 0–0.85)
MONOCYTES NFR BLD AUTO: 5.2 % (ref 0–13.4)
NEUTROPHILS # BLD AUTO: 16.95 K/UL (ref 1.82–7.42)
NEUTROPHILS NFR BLD: 87.6 % (ref 44–72)
NRBC # BLD AUTO: 0 K/UL
NRBC BLD-RTO: 0 /100 WBC (ref 0–0.2)
PHOSPHATE SERPL-MCNC: 4.8 MG/DL (ref 2.5–4.5)
PLATELET # BLD AUTO: 305 K/UL (ref 164–446)
PMV BLD AUTO: 9.7 FL (ref 9–12.9)
POTASSIUM SERPL-SCNC: 4.1 MMOL/L (ref 3.6–5.5)
POTASSIUM SERPL-SCNC: 4.2 MMOL/L (ref 3.6–5.5)
PROT SERPL-MCNC: 4.8 G/DL (ref 6–8.2)
RBC # BLD AUTO: 2.7 M/UL (ref 4.7–6.1)
SCCMEC + MECA PNL NOSE NAA+PROBE: NEGATIVE
SODIUM SERPL-SCNC: 131 MMOL/L (ref 135–145)
SODIUM SERPL-SCNC: 132 MMOL/L (ref 135–145)
WBC # BLD AUTO: 19.3 K/UL (ref 4.8–10.8)

## 2024-01-26 PROCEDURE — 700111 HCHG RX REV CODE 636 W/ 250 OVERRIDE (IP): Mod: JZ | Performed by: INTERNAL MEDICINE

## 2024-01-26 PROCEDURE — 82962 GLUCOSE BLOOD TEST: CPT | Mod: 91

## 2024-01-26 PROCEDURE — 700102 HCHG RX REV CODE 250 W/ 637 OVERRIDE(OP): Performed by: INTERNAL MEDICINE

## 2024-01-26 PROCEDURE — 83605 ASSAY OF LACTIC ACID: CPT

## 2024-01-26 PROCEDURE — 700111 HCHG RX REV CODE 636 W/ 250 OVERRIDE (IP): Performed by: INTERNAL MEDICINE

## 2024-01-26 PROCEDURE — 700105 HCHG RX REV CODE 258: Performed by: INTERNAL MEDICINE

## 2024-01-26 PROCEDURE — 99291 CRITICAL CARE FIRST HOUR: CPT | Performed by: INTERNAL MEDICINE

## 2024-01-26 PROCEDURE — 82550 ASSAY OF CK (CPK): CPT

## 2024-01-26 PROCEDURE — 84100 ASSAY OF PHOSPHORUS: CPT

## 2024-01-26 PROCEDURE — 82330 ASSAY OF CALCIUM: CPT

## 2024-01-26 PROCEDURE — 83735 ASSAY OF MAGNESIUM: CPT

## 2024-01-26 PROCEDURE — 80048 BASIC METABOLIC PNL TOTAL CA: CPT

## 2024-01-26 PROCEDURE — A9270 NON-COVERED ITEM OR SERVICE: HCPCS | Performed by: INTERNAL MEDICINE

## 2024-01-26 PROCEDURE — 86706 HEP B SURFACE ANTIBODY: CPT

## 2024-01-26 PROCEDURE — 82010 KETONE BODYS QUAN: CPT

## 2024-01-26 PROCEDURE — 80053 COMPREHEN METABOLIC PANEL: CPT

## 2024-01-26 PROCEDURE — 770022 HCHG ROOM/CARE - ICU (200)

## 2024-01-26 PROCEDURE — 80074 ACUTE HEPATITIS PANEL: CPT

## 2024-01-26 PROCEDURE — 99222 1ST HOSP IP/OBS MODERATE 55: CPT | Performed by: INTERNAL MEDICINE

## 2024-01-26 PROCEDURE — 85025 COMPLETE CBC W/AUTO DIFF WBC: CPT

## 2024-01-26 RX ORDER — SODIUM BICARBONATE IN D5W 150/1000ML
PLASTIC BAG, INJECTION (ML) INTRAVENOUS CONTINUOUS
Status: DISCONTINUED | OUTPATIENT
Start: 2024-01-26 | End: 2024-01-28

## 2024-01-26 RX ORDER — LORAZEPAM 2 MG/ML
1 INJECTION INTRAMUSCULAR EVERY 6 HOURS PRN
Status: DISCONTINUED | OUTPATIENT
Start: 2024-01-26 | End: 2024-02-08 | Stop reason: HOSPADM

## 2024-01-26 RX ORDER — LORAZEPAM 2 MG/ML
1 INJECTION INTRAMUSCULAR ONCE
Status: COMPLETED | OUTPATIENT
Start: 2024-01-26 | End: 2024-01-26

## 2024-01-26 RX ORDER — MAGNESIUM SULFATE HEPTAHYDRATE 40 MG/ML
2 INJECTION, SOLUTION INTRAVENOUS ONCE
Status: COMPLETED | OUTPATIENT
Start: 2024-01-26 | End: 2024-01-26

## 2024-01-26 RX ORDER — DEXTROSE MONOHYDRATE 25 G/50ML
25 INJECTION, SOLUTION INTRAVENOUS
Status: DISCONTINUED | OUTPATIENT
Start: 2024-01-26 | End: 2024-01-28

## 2024-01-26 RX ADMIN — SODIUM BICARBONATE: 84 INJECTION, SOLUTION INTRAVENOUS at 10:29

## 2024-01-26 RX ADMIN — ASPIRIN 81 MG: 81 TABLET, COATED ORAL at 05:40

## 2024-01-26 RX ADMIN — CALCIUM CHLORIDE 1000 MG: 100 INJECTION, SOLUTION INTRAVENOUS at 11:16

## 2024-01-26 RX ADMIN — ATORVASTATIN CALCIUM 80 MG: 40 TABLET, FILM COATED ORAL at 17:19

## 2024-01-26 RX ADMIN — HEPARIN SODIUM 5000 UNITS: 5000 INJECTION, SOLUTION INTRAVENOUS; SUBCUTANEOUS at 23:35

## 2024-01-26 RX ADMIN — SODIUM BICARBONATE 650 MG: 650 TABLET ORAL at 05:42

## 2024-01-26 RX ADMIN — LORAZEPAM 1 MG: 2 INJECTION INTRAMUSCULAR; INTRAVENOUS at 13:20

## 2024-01-26 RX ADMIN — HEPARIN SODIUM 5000 UNITS: 5000 INJECTION, SOLUTION INTRAVENOUS; SUBCUTANEOUS at 13:27

## 2024-01-26 RX ADMIN — MAGNESIUM SULFATE HEPTAHYDRATE 2 G: 2 INJECTION, SOLUTION INTRAVENOUS at 08:59

## 2024-01-26 RX ADMIN — OMEPRAZOLE 20 MG: 20 CAPSULE, DELAYED RELEASE ORAL at 05:40

## 2024-01-26 RX ADMIN — PIPERACILLIN AND TAZOBACTAM 4.5 G: 4; .5 INJECTION, POWDER, FOR SOLUTION INTRAVENOUS at 07:20

## 2024-01-26 RX ADMIN — LINEZOLID 600 MG: 600 INJECTION, SOLUTION INTRAVENOUS at 05:58

## 2024-01-26 RX ADMIN — SODIUM BICARBONATE: 84 INJECTION, SOLUTION INTRAVENOUS at 20:20

## 2024-01-26 RX ADMIN — HEPARIN SODIUM 5000 UNITS: 5000 INJECTION, SOLUTION INTRAVENOUS; SUBCUTANEOUS at 06:06

## 2024-01-26 RX ADMIN — LINEZOLID 600 MG: 600 INJECTION, SOLUTION INTRAVENOUS at 17:38

## 2024-01-26 RX ADMIN — CHOLESTYRAMINE 4 G: 4 POWDER, FOR SUSPENSION ORAL at 07:48

## 2024-01-26 RX ADMIN — HEPARIN SODIUM 5000 UNITS: 5000 INJECTION, SOLUTION INTRAVENOUS; SUBCUTANEOUS at 01:03

## 2024-01-26 RX ADMIN — INSULIN HUMAN 2 UNITS: 100 INJECTION, SOLUTION PARENTERAL at 20:17

## 2024-01-26 RX ADMIN — PIPERACILLIN AND TAZOBACTAM 4.5 G: 4; .5 INJECTION, POWDER, FOR SOLUTION INTRAVENOUS at 17:36

## 2024-01-26 RX ADMIN — CHOLESTYRAMINE 4 G: 4 POWDER, FOR SUSPENSION ORAL at 20:20

## 2024-01-26 ASSESSMENT — COGNITIVE AND FUNCTIONAL STATUS - GENERAL
SUGGESTED CMS G CODE MODIFIER MOBILITY: CJ
DAILY ACTIVITIY SCORE: 20
MOVING TO AND FROM BED TO CHAIR: A LITTLE
DRESSING REGULAR LOWER BODY CLOTHING: A LITTLE
TOILETING: A LOT
MOBILITY SCORE: 21
SUGGESTED CMS G CODE MODIFIER DAILY ACTIVITY: CJ
HELP NEEDED FOR BATHING: A LITTLE
WALKING IN HOSPITAL ROOM: A LITTLE
CLIMB 3 TO 5 STEPS WITH RAILING: A LITTLE

## 2024-01-26 ASSESSMENT — LIFESTYLE VARIABLES
CONSUMPTION TOTAL: NEGATIVE
HAVE PEOPLE ANNOYED YOU BY CRITICIZING YOUR DRINKING: NO
ALCOHOL_USE: NO
TOTAL SCORE: 0
ON A TYPICAL DAY WHEN YOU DRINK ALCOHOL HOW MANY DRINKS DO YOU HAVE: 0
TOTAL SCORE: 0
AVERAGE NUMBER OF DAYS PER WEEK YOU HAVE A DRINK CONTAINING ALCOHOL: 0
TOTAL SCORE: 0
HOW MANY TIMES IN THE PAST YEAR HAVE YOU HAD 5 OR MORE DRINKS IN A DAY: 0
HAVE YOU EVER FELT YOU SHOULD CUT DOWN ON YOUR DRINKING: NO
EVER HAD A DRINK FIRST THING IN THE MORNING TO STEADY YOUR NERVES TO GET RID OF A HANGOVER: NO
DOES PATIENT WANT TO STOP DRINKING: NO
EVER FELT BAD OR GUILTY ABOUT YOUR DRINKING: NO

## 2024-01-26 ASSESSMENT — ENCOUNTER SYMPTOMS
VOMITING: 1
ABDOMINAL PAIN: 0
FEVER: 0
HEADACHES: 1
NAUSEA: 1
SHORTNESS OF BREATH: 0

## 2024-01-26 ASSESSMENT — FIBROSIS 4 INDEX
FIB4 SCORE: 1.03
FIB4 SCORE: 1.2

## 2024-01-26 NOTE — ASSESSMENT & PLAN NOTE
Goal blood glucose 140-180  Continue home basal insulin, sliding scale insulin, accuchecks  hypoglycemia protocol  A1c 7.2 two weeks ago

## 2024-01-26 NOTE — PROGRESS NOTES
Brief Ortho Update:  - Osteomyelitis of left fifth prox phalanx  - Needs amputation of left fifth toe  - Spoke with intensivist who prefers to wait on surgery until more stabilized  - Will round back with primary team tomorrow and determine optimal timing of surgery when able  - Please keep NPO tonight at [place]

## 2024-01-26 NOTE — PROGRESS NOTES
Dr. Darya lopes updated on pt's low urine output since 2000. Pt had voided 50ml at 0200. Post residual U/S of bladder at 0215 was 300 ml.

## 2024-01-26 NOTE — DISCHARGE PLANNING
IP Consult to CM for pt with recent admission within the last 30 days. Pt was last admitted to [facility] on 1/9/2024 with syncope and collapse and pt discharged home with close outpatient follow-up on 1/13/2024. There was no assessment completed on pt during his last admission. LMSW met w/ pt at bedside to complete assessment. Pt A/Ox4 and agreeable to assessment. Pt confirms that all information on facesheet is accurate. Pt states that he resides in [place], [place] in a home with his wife, Carola. Pt was independent in all ADLs/IADLs prior to this admission and does not utilize any DME at baseline. Pt's preferred pharmacy is [facility] ([phone]).      Care Transition Team Assessment    Information Source  Orientation Level: Oriented X4  Information Given By: Patient  Informant's Name: Beni  Who is responsible for making decisions for patient? : Patient         Elopement Risk  Legal Hold: No  Ambulatory or Self Mobile in Wheelchair: No-Not an Elopement Risk  Elopement Risk: Not at Risk for Elopement         Discharge Preparedness  What is your plan after discharge?: Uncertain - pending medical team collaboration  What are your discharge supports?: Spouse  Prior Functional Level: Ambulatory, Drives Self, Independent with Activities of Daily Living, Independent with Medication Management  Difficulity with ADLs: None  Difficulity with IADLs: None    Functional Assesment  Prior Functional Level: Ambulatory, Drives Self, Independent with Activities of Daily Living, Independent with Medication Management    Finances  Financial Barriers to Discharge: No  Prescription Coverage: Yes ([facility] in [place], [place] ([phone]))              Advance Directive  Advance Directive?: None  Advance Directive offered?: AD Booklet refused    Domestic Abuse  Have you ever been the victim of abuse or violence?: No    Psychological Assessment  History of Substance Abuse: None  History of Psychiatric Problems: No    Discharge Risks  or Barriers  Discharge risks or barriers?: No    Anticipated Discharge Information  Discharge Disposition: Discharged to home/self care (01)

## 2024-01-26 NOTE — ASSESSMENT & PLAN NOTE
Likely ATN due to sepsis overlying diabetic nephropathy  Follows with [facility]  IVF  Renal dose meds, avoid nephrotoxins  Strict I/Os  Follow renal function

## 2024-01-26 NOTE — PROGRESS NOTES
Patient arrived to 930 on monitor accompanied by AYLA RN    Patient's vital signs on admit:     HR: 72  BP: 100/52  RR: 16  SaO2: 96 on RA  Wt: 90.2 kg  Temp: 97.6 F  ___________________________________________________________________________     4 Eyes Skin Assessment Completed by Selma RN and JULIANO Daley.    Head WDL  Ears WDL  Nose WDL  Mouth WDL  Neck WDL  Breast/Chest WDL  Shoulder Blades WDL  Spine WDL  (R) Arm/Elbow/Hand glucose meter posterior arm, r forearm abrasion  (L) Arm/Elbow/Hand Scar scattered  Abdomen WDL  Groin WDL  Scrotum/Coccyx/Buttocks WDL  (R) Leg Abrasions  (L) Leg WDL  (R) Heel/Foot/Toe amputated all toes- healed  (L) Heel/Foot/Toe wound to pinky toe and 4th toe, amputated 3rd toe, scab on second toe,     Devices In Places ECG, Pulse Ox, and SCD's    Interventions In Place Sacral Mepilex, Pillows, and Pressure Redistribution Mattress    Possible Skin Injury Yes    Pictures Uploaded Into Epic Yes  Wound Consult Placed Yes  RN Wound Prevention Protocol Ordered Yes  ___________________________________________________________________________     Patient's personal belongings include:     glasses  Hat  shirt  Pajama pants

## 2024-01-26 NOTE — CARE PLAN
The patient is Watcher - Medium risk of patient condition declining or worsening    Shift Goals  Clinical Goals: pain control, hemodymic stability, serial labs  Patient Goals: comfort, sleep  Family Goals: No family present    Progress made toward(s) clinical / shift goals:    Problem: Knowledge Deficit - Standard  Goal: Patient and family/care givers will demonstrate understanding of plan of care, disease process/condition, diagnostic tests and medications  Outcome: Progressing     Problem: Hemodynamics  Goal: Patient's hemodynamics, fluid balance and neurologic status will be stable or improve  Outcome: Progressing     Problem: Pain - Standard  Goal: Alleviation of pain or a reduction in pain to the patient’s comfort goal  Outcome: Progressing       Patient is not progressing towards the following goals:

## 2024-01-26 NOTE — ED NOTES
Bedside report rec'd from JULIANO Hahn.  Pt to T930, on monitors with ICU RN,  all belongings with pt.  Wife with pt for transfer to ICU.

## 2024-01-26 NOTE — ASSESSMENT & PLAN NOTE
Shock has resolved  Continue linezolid plus Zosyn  Cultures remain negative  Pending operative plan from orthopedics

## 2024-01-26 NOTE — H&P
Critical Care H&P    Date of consult: 1/25/2024    Referring Physician  Jacobo Vasquez M.D.     Reason for Consultation  Septic shock    History of Presenting Illness  53 y.o. 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 The patient reports his lost consciousness 2 weeks ago (1/9/24) and hit his foot, he was admitted at that time but not imaging of his foot was obtained. Yesterday he states his foot was fine however when he looked at it today he noted his 5th toe was bleeding and he prodded it with a Q-tip which made it bleed more and had some purulent discharge. In the ED he was noted to be hypotensive and was given a sepsis bolus with some improvement. His WBC was found to be significantly elevated at 24.7 and a XR showed gas in the soft tissues. Ortho was consulted (Imelda) and a CT was requested. His metabolic panel shows acute on chronic kidney disease with a Cr of 6.36 (baseline ~3). The patient will be admitted to the ICU for septic shock.    Code Status  Full Code    Review of Systems  Review of Systems   Constitutional:  Positive for malaise/fatigue. Negative for chills and fever.   Eyes:  Negative for blurred vision.   Respiratory:  Negative for cough, sputum production, shortness of breath and stridor.    Cardiovascular:  Negative for chest pain.   Gastrointestinal:  Negative for abdominal pain, nausea and vomiting.        Singultus   Genitourinary:  Negative for dysuria.   Musculoskeletal:  Negative for myalgias.   Skin:  Negative for rash.        LLE wound   Neurological:  Positive for sensory change (chronic neuropathic changes) and weakness. Negative for dizziness and focal weakness.       Past Medical History   has a past medical history of Anesthesia, Anesthesia (12/06/2023), Bowel habit changes, Cataract, Cataract, Deaf, right, Diabetes (HCC), Heart burn,  Hemorrhagic disorder (HCC), History of non-ST elevation myocardial infarction (NSTEMI), heart artery stent (2019), Hyperlipidemia, Hyperparathyroidism due to renal insufficiency (HCC), Hypertension, Myocardial infarct (HCC), Pneumonia, and Renal disorder (12/06/2023).    Surgical History   has a past surgical history that includes other orthopedic surgery; toe amputation (Right, 07/02/2018); achilles tendon repair (Right, 07/02/2018); irrigation & debridement ortho (Right, 07/02/2018); vasectomy; tonsillectomy; other abdominal surgery; stent placement (2019); toe amputation (Left, 09/30/2021); other (Left); cataract extraction with iol (Right, 2023); pr open treatment prox humeral fracture (Left, 12/11/2023); and pr repair biceps long tendon (Left, 12/11/2023).    Family History  family history includes Cancer in his paternal grandfather and paternal grandmother; Diabetes in his father and maternal grandmother; Heart Disease in his father and maternal grandfather; Lung Cancer in his paternal grandfather; Lung Disease in his paternal grandmother; No Known Problems in his mother.    Social History   reports that he quit smoking about 22 years ago. His smoking use included cigarettes. He started smoking about 32 years ago. He has a 5.0 pack-year smoking history. He has never used smokeless tobacco. He reports that he does not currently use drugs after having used the following drugs: Marijuana and Oral. He reports that he does not drink alcohol.    Medications  Home Medications       Reviewed by Ahmet Oneil (Pharmacy Tech) on 01/25/24 at 1650  Med List Status: Complete     Medication Last Dose Status   aspirin EC (ECOTRIN) 81 MG Tablet Delayed Response 1/25/2024 Active   atorvastatin (LIPITOR) 80 MG tablet 1/24/2024 Active   carvedilol (COREG) 3.125 MG Tab 1/25/2024 Active   cholestyramine (QUESTRAN) 4 g packet 1/24/2024 Active   enalapril (VASOTEC) 20 MG tablet 1/25/2024 Active   furosemide (LASIX) 40 MG Tab  1/9/2024 Active   HUMULIN R 100 UNIT/ML Solution 1/22/2024 Active   Insulin Degludec (TRESIBA FLEXTOUCH) 100 UNIT/ML Solution Pen-injector 1/24/2024 Active   omeprazole (PRILOSEC) 20 MG delayed-release capsule 1/25/2024 Active   oseltamivir (TAMIFLU) 30 MG Cap 1/16/2024 Active   Ranibizumab (LUCENTIS) 0.3 MG/0.05ML Solution ~MONTH AGO Active   sodium bicarbonate (SODIUM BICARBONATE) 650 MG Tab 1/25/2024 Active                  Current Facility-Administered Medications   Medication Dose Route Frequency Provider Last Rate Last Admin    NS (Bolus) 0.9 % infusion 2,655 mL  30 mL/kg Intravenous Once Jacobo Vasquez M.D.        piperacillin-tazobactam (Zosyn) 4.5 g in  mL IVPB  4.5 g Intravenous Once Jacobo Vasquez M.D.        Linezolid (Zyvox) premix 600 mg  600 mg Intravenous Q12HRS JD Doss Jr..O.        Insulin Degludec SOPN 36 Units  36 Units Subcutaneous Q EVENING Tino Romano Jr., JD.O.        cholestyramine (Questran) 4 g powder 4 g  1 Packet Oral BID JD Doss Jr..O.        atorvastatin (Lipitor) tablet 80 mg  80 mg Oral Q EVENING JD Doss Jr..O.        omeprazole (PriLOSEC) capsule 20 mg  20 mg Oral QAM JD Doss Jr..O.        sodium bicarbonate tablet 650 mg  650 mg Oral TID JD Doss Jr..O.        senna-docusate (Pericolace Or Senokot S) 8.6-50 MG per tablet 2 Tablet  2 Tablet Oral BID JD Doss Jr..O.        And    polyethylene glycol/lytes (Miralax) Packet 1 Packet  1 Packet Oral QDAY PRN JD Doss Jr..O.        And    magnesium hydroxide (Milk Of Magnesia) suspension 30 mL  30 mL Oral QDAY PRN JD Doss Jr..O.        And    bisacodyl (Dulcolax) suppository 10 mg  10 mg Rectal QDAY PRN Tino Romano Jr., JD.O.        heparin injection 5,000 Units  5,000 Units Subcutaneous Q8HRS JD Doss Jr..O.        ondansetron (Zofran) syringe/vial injection 4 mg  4 mg Intravenous Q4HRS PRN Tino Romano Jr., JD.O.         ondansetron (Zofran ODT) dispertab 4 mg  4 mg Oral Q4HRS PRN JD Doss Jr..O.        promethazine (Phenergan) tablet 12.5-25 mg  12.5-25 mg Oral Q4HRS PRN JD Doss Jr..O.        promethazine (Phenergan) suppository 12.5-25 mg  12.5-25 mg Rectal Q4HRS PRN JD Doss Jr..O.        prochlorperazine (Compazine) injection 5-10 mg  5-10 mg Intravenous Q4HRS PRN JD Doss Jr..O.        acetaminophen (Tylenol) tablet 650 mg  650 mg Oral Q6HRS PRN JD Doss Jr..O.        insulin regular (HumuLIN R,NovoLIN R) injection  3-14 Units Subcutaneous Q6HRS JD Doss Jr..O.        And    dextrose 50% (D50W) injection 25 g  25 g Intravenous Q15 MIN PRN JD Doss Jr..O.        LR (Bolus) infusion 500 mL  500 mL Intravenous Once PRN JD Doss Jr..O.        piperacillin-tazobactam (Zosyn) 4.5 g in  mL IVPB  4.5 g Intravenous Once JD Doss Jr..OAsiya        And    piperacillin-tazobactam (Zosyn) 4.5 g in  mL IVPB  4.5 g Intravenous Q6HRS JD Doss Jr..O.        norepinephrine (Levophed) 8 mg in 250 mL NS infusion (premix)  0-1 mcg/kg/min (Ideal) Intravenous Continuous JD Doss Jr..OAsiya        And    vasopressin (Vasostrict) 20 Units in  mL Infusion  0.03 Units/min Intravenous Continuous JD Doss Jr..O.         Current Outpatient Medications   Medication Sig Dispense Refill    oseltamivir (TAMIFLU) 30 MG Cap Take 30 mg by mouth every day. X 3 days      sodium bicarbonate (SODIUM BICARBONATE) 650 MG Tab Take 1 Tablet by mouth in the morning, at noon, and at bedtime. 120 Tablet 0    furosemide (LASIX) 40 MG Tab Take 0.5 Tablets by mouth 1 time a day as needed (If noticeable swelling in extremities and abdomen or gained 2lb in 24hrs.). 30 Tablet 0    enalapril (VASOTEC) 20 MG tablet TAKE ONE-HALF (1/2) TABLET TWICE A DAY 90 Tablet 3    HUMULIN R 100 UNIT/ML Solution Inject 4-8 Units under the skin 3 times a day before  meals. Sliding Scale   150 - 200 = 3 units  201 - 250 = 4 units  251 - 300 = 5 units  301 - 350 = 6 units  351 - 400 = 7 units   PLUS CARB CORRECTION  of 1 unit for every 15 g carb      Ranibizumab (LUCENTIS) 0.3 MG/0.05ML Solution 1 Dose by Intravitreal route see administration instructions. Every 6-8 weeks      atorvastatin (LIPITOR) 80 MG tablet Take 1 Tablet by mouth every evening. 100 Tablet 4    Insulin Degludec (TRESIBA FLEXTOUCH) 100 UNIT/ML Solution Pen-injector Inject 36 Units under the skin every evening. 36 units at night      aspirin EC (ECOTRIN) 81 MG Tablet Delayed Response Take 1 Tablet by mouth every day. 90 Tablet 2    carvedilol (COREG) 3.125 MG Tab Take 1 Tablet by mouth 2 times a day with meals. 180 Tablet 3    cholestyramine (QUESTRAN) 4 g packet Take 1 Packet by mouth 2 times a day. 1000 + 1700      omeprazole (PRILOSEC) 20 MG delayed-release capsule Take 1 Capsule by mouth every morning.         Allergies  No Known Allergies    Vital Signs last 24 hours  Temp:  [36.7 °C (98 °F)] 36.7 °C (98 °F)  Pulse:  [67] 67  Resp:  [19] 19  BP: (89)/(55) 89/55  SpO2:  [98 %] 98 %    Physical Exam  Physical Exam  Vitals and nursing note reviewed.   Constitutional:       General: He is not in acute distress.     Appearance: Normal appearance. He is well-developed and normal weight. He is ill-appearing.   HENT:      Head: Normocephalic and atraumatic.      Right Ear: External ear normal.      Left Ear: External ear normal.      Nose: Nose normal.      Mouth/Throat:      Mouth: Mucous membranes are dry.   Eyes:      Extraocular Movements: Extraocular movements intact.      Conjunctiva/sclera: Conjunctivae normal.   Neck:      Vascular: No JVD.      Trachea: No tracheal deviation.   Cardiovascular:      Rate and Rhythm: Normal rate and regular rhythm.      Pulses: Normal pulses.   Pulmonary:      Effort: Pulmonary effort is normal.      Breath sounds: Normal breath sounds. No rales.   Abdominal:      General:  Bowel sounds are normal. There is no distension.      Palpations: Abdomen is soft.   Musculoskeletal:         General: No tenderness.      Cervical back: Neck supple.      Right lower leg: No edema.      Left lower leg: No edema.      Comments: Right transmetatarsal amputation, well healed   Skin:     General: Skin is warm and dry.      Capillary Refill: Capillary refill takes less than 2 seconds.      Findings: Erythema present. No rash.      Comments: Hemorrhagic bulla of the plantar surface of the left foot, sanguinous discharge on the medial left 5th toe.   Neurological:      General: No focal deficit present.      Mental Status: He is alert and oriented to person, place, and time.      Cranial Nerves: No cranial nerve deficit.      Sensory: Sensory deficit (chronic) present.      Motor: No weakness.   Psychiatric:         Mood and Affect: Mood normal.         Behavior: Behavior normal.         Fluids  No intake or output data in the 24 hours ending 24 1710    Laboratory  Recent Results (from the past 48 hour(s))   EKG    Collection Time: 24  3:03 PM   Result Value Ref Range    Report       [facility] Emergency Dept.    Test Date:  2024  Pt Name:    HELIO BARROW                     Department: ER  MRN:        [id number]                      Room:       [facility]  Gender:     Male                         Technician: [id number]  :        1970                   Requested By:ER TRIAGE PROTOCOL  Order #:    [id number]                    Reading MD:    Measurements  Intervals                                Axis  Rate:       67                           P:          74  OH:         136                          QRS:        65  QRSD:       117                          T:          38  QT:         458  QTc:        484    Interpretive Statements  Sinus rhythm  Nonspecific intraventricular conduction delay  Borderline low voltage, extremity leads  Compared to ECG 2024 18:55:47  Myocardial  infarct finding no longer present     Lactic Acid    Collection Time: 01/25/24  3:24 PM   Result Value Ref Range    Lactic Acid 1.1 0.5 - 2.0 mmol/L   CBC with Differential    Collection Time: 01/25/24  3:24 PM   Result Value Ref Range    WBC 24.7 (H) 4.8 - 10.8 K/uL    RBC 2.94 (L) 4.70 - 6.10 M/uL    Hemoglobin 8.5 (L) 14.0 - 18.0 g/dL    Hematocrit 25.7 (L) 42.0 - 52.0 %    MCV 87.4 81.4 - 97.8 fL    MCH 28.9 27.0 - 33.0 pg    MCHC 33.1 32.3 - 36.5 g/dL    RDW 45.8 35.9 - 50.0 fL    Platelet Count 320 164 - 446 K/uL    MPV 9.9 9.0 - 12.9 fL    Neutrophils-Polys 87.40 (H) 44.00 - 72.00 %    Lymphocytes 5.20 (L) 22.00 - 41.00 %    Monocytes 5.50 0.00 - 13.40 %    Eosinophils 0.10 0.00 - 6.90 %    Basophils 0.20 0.00 - 1.80 %    Immature Granulocytes 1.60 (H) 0.00 - 0.90 %    Nucleated RBC 0.00 0.00 - 0.20 /100 WBC    Neutrophils (Absolute) 21.55 (H) 1.82 - 7.42 K/uL    Lymphs (Absolute) 1.28 1.00 - 4.80 K/uL    Monos (Absolute) 1.36 (H) 0.00 - 0.85 K/uL    Eos (Absolute) 0.03 0.00 - 0.51 K/uL    Baso (Absolute) 0.05 0.00 - 0.12 K/uL    Immature Granulocytes (abs) 0.39 (H) 0.00 - 0.11 K/uL    NRBC (Absolute) 0.00 K/uL   Complete Metabolic Panel    Collection Time: 01/25/24  3:24 PM   Result Value Ref Range    Sodium 131 (L) 135 - 145 mmol/L    Potassium 4.7 3.6 - 5.5 mmol/L    Chloride 104 96 - 112 mmol/L    Co2 11 (L) 20 - 33 mmol/L    Anion Gap 16.0 7.0 - 16.0    Glucose 186 (H) 65 - 99 mg/dL    Bun 72 (H) 8 - 22 mg/dL    Creatinine 6.36 (HH) 0.50 - 1.40 mg/dL    Calcium 7.3 (L) 8.5 - 10.5 mg/dL    Correct Calcium 8.9 8.5 - 10.5 mg/dL    AST(SGOT) 19 12 - 45 U/L    ALT(SGPT) 10 2 - 50 U/L    Alkaline Phosphatase 72 30 - 99 U/L    Total Bilirubin 0.5 0.1 - 1.5 mg/dL    Albumin 2.0 (L) 3.2 - 4.9 g/dL    Total Protein 5.7 (L) 6.0 - 8.2 g/dL    Globulin 3.7 (H) 1.9 - 3.5 g/dL    A-G Ratio 0.5 g/dL   ESTIMATED GFR    Collection Time: 01/25/24  3:24 PM   Result Value Ref Range    GFR (CKD-EPI) 10 (A) >60 mL/min/1.73 m 2    TROPONIN    Collection Time: 01/25/24  3:24 PM   Result Value Ref Range    Troponin T 68 (H) 6 - 19 ng/L       Imaging  DX-FOOT-COMPLETE 3+ LEFT   Final Result      1.  Comminuted fracture proximal phalanx fifth digit is identified which could be pathologic and related to osteomyelitis.      2.  Soft tissue swelling and emphysema in the fifth digit which could be due to cellulitis.      DX-CHEST-PORTABLE (1 VIEW)   Final Result         No acute cardiac or pulmonary abnormality is identified.      CT-FOOT W/O PLUS RECONS LEFT    (Results Pending)       Assessment/Plan  * Septic shock (HCC)- (present on admission)  Assessment & Plan  This is Septic shock Present on admission  SIRS criteria identified on my evaluation include: Tachypnea, with respirations greater than 20 per minute and Leukocytosis, with WBC greater than 12,000  Clinical indicators of end organ dysfunction include Hypotension with systolic blood pressure less than 90 or MAP less than 65 and Acute On Chronic Renal Failure, with creatinine >0.5 above baseline level  Indicators of septic shock include: Sepsis present and persistent hypotension despite fluid resuscitation   Sources is: left foot infection  Sepsis protocol initiated  Crystalloid Fluid Administration: Fluid resuscitation ordered per standard protocol - 30 mL/kg per current or ideal body weight  IV antibiotics as appropriate for source of sepsis  Reassessment: I have reassessed the patient's hemodynamic status    Unspecified fracture of left toe(s), initial encounter for open fracture- (present on admission)  Assessment & Plan  Likely pathologic due to osteo  Ortho consulted in ED    Soft tissue infection of foot- (present on admission)  Assessment & Plan  Left foot wound, diabetic  Start polymicrobial coverage with zosyn and zyvox  Possible necrotizing component, CT pending  Ortho consulted in ED, Dr Segura    Osteomyelitis of left foot (HCC)- (present on admission)  Assessment &  Plan  Continue zosyn, zyvox  Consult ID in the AM    Type 2 diabetes mellitus with kidney complication, with long-term current use of insulin (HCC)- (present on admission)  Assessment & Plan  Goal blood glucose 140-180  Continue home basal insulin, sliding scale insulin, accuchecks  hypoglycemia protocol  A1c 7.2 two weeks ago    Acute renal failure superimposed on stage 3b chronic kidney disease (HCC)- (present on admission)  Assessment & Plan  Likely ATN due to sepsis overlying diabetic nephropathy  Follows with [facility]  IVF  Renal dose meds, avoid nephrotoxins  Strict I/Os  Follow renal function    Normocytic anemia- (present on admission)  Assessment & Plan  Chronic, follow  Restrictive transfusion strategy        Discussed patient condition and risk of morbidity and/or mortality with RN, RT, Pharmacy, Code status disscussed, Patient, and ERP .      The patient remains critically ill.  Critical care time = 45 minutes in directly providing and coordinating critical care and extensive data review.  No time overlap and excludes procedures.    Please note that this dictation was created using voice recognition software. The accuracy of the dictation is limited to the abilities of the software. I have made every reasonable attempt to correct obvious errors, but I expect that there are errors of grammar and possibly content that I did not discover before finalizing the note.

## 2024-01-26 NOTE — WOUND TEAM
[facility]  Inpatient Services  Initial Wound and Skin Care Evaluation    Admission Date: 2024     Last order of IP CONSULT TO WOUND CARE was found on 2024 from Hospital Encounter on 2024     HPI, PMH, SH: Reviewed    Past Surgical History:   Procedure Laterality Date    PB OPEN TREATMENT PBOX HUMERAL FRACTURE Left 2023    Procedure: LEFT OPEN REDUCTION INTERNAL FIXATION  HUMERUS, PROXIMAL AND LEFT BICEPS TENODESIS;  Surgeon: Aris Pierre M.D.;  Location: SURGERY [facility];  Service: Orthopedics    PB REPAIR BICEPS LONG TENDON Left 2023    Procedure: TENODESIS, BICEPS, OPEN;  Surgeon: Aris Pierre M.D.;  Location: SURGERY [facility];  Service: Orthopedics    CATARACT EXTRACTION WITH IOL Right     TOE AMPUTATION Left 2021    Procedure: AMPUTATION, TOE;  Surgeon: Tomer Hart M.D.;  Location: SURGERY [facility];  Service: Orthopedics    STENT PLACEMENT  2019    STEMI with nonobstructive LAD    TOE AMPUTATION Right 2018    Procedure: Transmetatarsal amputation;  Surgeon: Ravi Bernardo M.D.;  Location: SURGERY [facility];  Service: Orthopedics    ACHILLES TENDON REPAIR Right 2018    Procedure: ACHILLES LENGTHENING;  Surgeon: Ravi Bernardo M.D.;  Location: SURGERY [facility];  Service: Orthopedics    IRRIGATION & DEBRIDEMENT ORTHO Right 2018    Procedure: IRRIGATION & DEBRIDEMENT ORTHO;  Surgeon: Ravi Bernardo M.D.;  Location: SURGERY [facility];  Service: Orthopedics    OTHER Left     IOL    OTHER ABDOMINAL SURGERY      OTHER ORTHOPEDIC SURGERY      TONSILLECTOMY      VASECTOMY       Social History     Tobacco Use    Smoking status: Former     Current packs/day: 0.00     Average packs/day: 0.5 packs/day for 10.0 years (5.0 ttl pk-yrs)     Types: Cigarettes     Start date:      Quit date:      Years since quittin.0    Smokeless tobacco: Never   Substance Use Topics    Alcohol use: No     Chief  Complaint   Patient presents with    Wound Check    Blood Pressure Problem     Diagnosis: Septic shock (HCC) [A41.9, R65.21]    Unit where seen by Wound Team: T930/01     WOUND CONSULT RELATED TO:  Left foot, Shin    WOUND TEAM PLAN OF CARE - Frequency of Follow-up:   Nursing to follow dressing orders written for wound care. Contact wound team if area fails to progress, deteriorates or with any questions/concerns if something comes up before next scheduled follow up (See below as to whether wound is following and frequency of wound follow up)   Weekly - Left foot (patient may end up going to OR and therefore Ortho would follow up and wound team would tentatively sign off)    WOUND HISTORY:   History of Dm with neuropathy. States wound to left foot started worsening yesterday, 1/25/24. Ortho was consulted and CT ordered.        WOUND ASSESSMENT/LDA  Wound 01/11/24 Full Thickness Wound Toe, 4th;Toe, 5th Left & Plantar Foot (Active)   Date First Assessed/Time First Assessed: 01/11/24 1600   Present on Original Admission: Yes  Primary Wound Type: Full Thickness Wound  Location: Toe, 4th;Toe, 5th  Laterality: Left  Wound Description (Comments): & Plantar Foot      Assessments 1/26/2024 12:45 PM   Wound Image      Site Assessment Red;Drainage;Edema;Black;Brown;Non-healing;Purple   Periwound Assessment Fragile;Edema;Red   Margins Attached edges;Undefined edges   Closure Open to air   Drainage Amount Scant   Drainage Description Sanguineous;Serous   Treatments Site care   Wound Cleansing Not Applicable   Periwound Protectant Not Applicable   Dressing Status Open to Air;Refused   Dressing Cleansing/Solutions Not Applicable   Dressing Options Open to Air   NEXT Weekly Photo (Inpatient Only) 02/02/24   Wound Team Following Weekly   Non-staged Wound Description Full thickness        Vascular:    FLOWER:   No results found.    Lab Values:    Lab Results   Component Value Date/Time    WBC 19.3 (H) 01/26/2024 06:40 AM    RBC 2.70 (L)  01/26/2024 06:40 AM    HEMOGLOBIN 7.7 (L) 01/26/2024 06:40 AM    HEMATOCRIT 25.3 (L) 01/26/2024 06:40 AM    CREACTPROT 1.08 (H) 09/30/2021 04:58 AM    SEDRATEWES 55 (H) 09/30/2021 04:58 AM    HBA1C 7.2 (H) 01/09/2024 07:17 PM         Culture Results show:  No results found for this or any previous visit (from the past 720 hour(s)).    Pain Level/Medicated:  None, Tolerated without pain medication       INTERVENTIONS BY WOUND TEAM:  Chart and images reviewed. Discussed with bedside RN. All areas of concern (based on picture review, LDA review and discussion with bedside RN) have been thoroughly assessed. Documentation of areas based on significant findings. This RN in to assess patient. Performed standard wound care which includes appropriate positioning, dressing removal and non-selective debridement. Pictures and measurements obtained weekly if/when required.    Wound:  Left foot  Primary Dressing:  Open to air - patient declining gauze dressing at this time    Advanced Wound Care Discharge Planning  Number of Clinicians necessary to complete wound care: 1  Is patient requiring IV pain medications for dressing changes:  No   Length of time for dressing change 5 min. (This does not include chart review, pre-medication time, set up, clean up or time spent charting.)    Interdisciplinary consultation: Patient, Bedside RN (Wil), Provider Rae .  Pressure injury and staging reviewed with N/A.    EVALUATION / RATIONALE FOR TREATMENT:     Date:  01/26/24  Wound Status:  Initial evaluation    Patient left 5th toe with full thickness necrotic tissue present, strong foul odor, and drainage noted. Left plantar foot with intact large blood filled blister. Pulses to DP faint to palpate. Patient requiring surgical intervention of Left 5th toe. Patient right lower anterior pretibial with intact scabs, kept open to air.     Sacrum intact. BL heels intact.      Goals: Steady decrease in wound area and depth weekly.    NURSING  PLAN OF CARE ORDERS:  Dressing changes: See Dressing Care orders    NUTRITION RECOMMENDATIONS   Wound Team Recommendations:  N/A    DIET ORDERS (From admission to next 24h)       Start     Ordered    01/25/24 1708  Diet NPO Restrict to: Sips with Medications  ALL MEALS        Question:  Diet NPO Restrict to:  Answer:  Sips with Medications    01/25/24 1709                    PREVENTATIVE INTERVENTIONS:    Q shift Johnny - performed per nursing policy  Q shift pressure point assessments - performed per nursing policy    Surface/Positioning  ICU Low Airloss - Currently in Place  Reposition q 2 hours - Currently in Place    Offloading/Redistribution  Float Heels off Bed with Pillows - Currently in Place         Anticipated discharge plans:  TBD        Vac Discharge Needs:  Vac Discharge plan is purely a recommendation from wound team and not a requirement for discharge unless otherwise stated by physician.  Not Applicable Pt not on a wound vac

## 2024-01-26 NOTE — CONSULTS
Nephrology Consultation    Date of Service  1/26/2024    Referring Physician  Alejandro Mcbride M.D.    Consulting Physician  Varun Ortiz M.D.    Reason for Consultation  AXEL on CKD 3B    History of Presenting Illness  53 y.o. 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    This patient is known to me from outpatient clinic, but I have not seen him since May 2023.  The patient was recently admitted to the hospital from 1/9/2024 through 1/13/2024, he was admitted for syncope.  Patient did have orthostatic hypotension noted during that admission.  He was told to stop taking his Lasix.    Over the past few weeks, patient has complained of headaches, mild nausea and vomiting, decreased appetite, and a bad taste in his mouth.  For the last few days he has had increasing pain and swelling and redness in his left foot.  He was concerned that his left foot was becoming infected, so he came to the hospital.  We briefly discussed that he might need dialysis soon, and that if he needs dialysis emergently would have to be hemodialysis.  However, patient previously expressed concern for home peritoneal dialysis.  He does say his home situation is not stable right now and is having some marital distress with his wife.    Review of Systems  Review of Systems   Constitutional:  Negative for fever.   Respiratory:  Negative for shortness of breath.    Cardiovascular:  Negative for chest pain.   Gastrointestinal:  Positive for nausea and vomiting. Negative for abdominal pain.   Musculoskeletal:         Left foot pain   Neurological:  Positive for headaches.   All other systems reviewed and are negative.      Past Medical History  Past Medical History:   Diagnosis Date    Anesthesia     Hypotension post-op, persisted for a few months    Anesthesia 12/06/2023    Patient stated after 2016  colonoscopy he became hypotensive.    Bowel habit changes     History GI problems    Cataract     IOL - Left eye    Cataract     IOL OD    Deaf, right     Diabetes (HCC)     Oral medications & insulin    Heart burn     GERD; takes Prilosec    Hemorrhagic disorder (HCC)     ASA protection for stent and cardiac history    History of non-ST elevation myocardial infarction (NSTEMI)     Per Problem List    Hx of heart artery stent 2019    Hyperlipidemia     Hyperparathyroidism due to renal insufficiency (HCC)     Per Problem List    Hypertension     Myocardial infarct (HCC)     Pneumonia     H/O    Renal disorder 12/06/2023    Stage 3 CKD       Surgical History  Past Surgical History:   Procedure Laterality Date    PB OPEN TREATMENT PBOX HUMERAL FRACTURE Left 12/11/2023    Procedure: LEFT OPEN REDUCTION INTERNAL FIXATION  HUMERUS, PROXIMAL AND LEFT BICEPS TENODESIS;  Surgeon: Aris Pierre M.D.;  Location: [facility];  Service: Orthopedics    PB REPAIR BICEPS LONG TENDON Left 12/11/2023    Procedure: TENODESIS, BICEPS, OPEN;  Surgeon: Aris Pierre M.D.;  Location: [facility];  Service: Orthopedics    CATARACT EXTRACTION WITH IOL Right 2023    TOE AMPUTATION Left 09/30/2021    Procedure: AMPUTATION, TOE;  Surgeon: Tomer Hart M.D.;  Location: [facility];  Service: Orthopedics    STENT PLACEMENT  2019    STEMI with nonobstructive LAD    TOE AMPUTATION Right 07/02/2018    Procedure: Transmetatarsal amputation;  Surgeon: Ravi Bernardo M.D.;  Location: [facility];  Service: Orthopedics    ACHILLES TENDON REPAIR Right 07/02/2018    Procedure: ACHILLES LENGTHENING;  Surgeon: Ravi Bernardo M.D.;  Location: [facility];  Service: Orthopedics    IRRIGATION & DEBRIDEMENT ORTHO Right 07/02/2018    Procedure: IRRIGATION & DEBRIDEMENT ORTHO;  Surgeon: Ravi Bernardo M.D.;  Location: [facility];  Service: Orthopedics    OTHER Left     IOL    OTHER  ABDOMINAL SURGERY      OTHER ORTHOPEDIC SURGERY      TONSILLECTOMY      VASECTOMY         Family History  Family History   Problem Relation Age of Onset    No Known Problems Mother     Diabetes Father     Heart Disease Father     Diabetes Maternal Grandmother     Heart Disease Maternal Grandfather     Lung Disease Paternal Grandmother     Cancer Paternal Grandmother     Cancer Paternal Grandfather     Lung Cancer Paternal Grandfather     Kidney Disease Neg Hx        Social History  Social History     Socioeconomic History    Marital status:      Spouse name: Not on file    Number of children: Not on file    Years of education: Not on file    Highest education level: Some college, no degree   Occupational History    Not on file   Tobacco Use    Smoking status: Former     Current packs/day: 0.00     Average packs/day: 0.5 packs/day for 10.0 years (5.0 ttl pk-yrs)     Types: Cigarettes     Start date:      Quit date:      Years since quittin.0    Smokeless tobacco: Never   Vaping Use    Vaping Use: Never used   Substance and Sexual Activity    Alcohol use: No    Drug use: Not Currently     Types: Marijuana, Oral     Comment: CBD Edibles 3-4 times per week    Sexual activity: Not Currently     Partners: Female   Other Topics Concern    Not on file   Social History Narrative    Not on file     Social Determinants of Health     Financial Resource Strain: Low Risk  (2022)    Overall Financial Resource Strain (CARDIA)     Difficulty of Paying Living Expenses: Not hard at all   Food Insecurity: No Food Insecurity (2022)    Hunger Vital Sign     Worried About Running Out of Food in the Last Year: Never true     Ran Out of Food in the Last Year: Never true   Transportation Needs: No Transportation Needs (2022)    PRAPARE - Transportation     Lack of Transportation (Medical): No     Lack of Transportation (Non-Medical): No   Physical Activity: Insufficiently Active (2022)    Exercise  Vital Sign     Days of Exercise per Week: 2 days     Minutes of Exercise per Session: 40 min   Stress: No Stress Concern Present (12/13/2022)    [employer] - Occupational Stress Questionnaire     Feeling of Stress : Not at all   Social Connections: Moderately Integrated (12/13/2022)    Social Connection and Isolation Panel [NHANES]     Frequency of Communication with Friends and Family: More than three times a week     Frequency of Social Gatherings with Friends and Family: More than three times a week     Attends Restoration Services: Never     Active Member of Clubs or Organizations: Yes     Attends Club or Organization Meetings: Never     Marital Status:    Intimate Partner Violence: Not on file   Housing Stability: Low Risk  (12/13/2022)    Housing Stability Vital Sign     Unable to Pay for Housing in the Last Year: No     Number of Places Lived in the Last Year: 1     Unstable Housing in the Last Year: No       Medications  Current Facility-Administered Medications   Medication Dose Route Frequency Provider Last Rate Last Admin    sodium bicarbonate 150 mEq in D5W infusion (premix)   Intravenous Continuous Varun Ortiz M.D. 125 mL/hr at 01/26/24 1029 New Bag at 01/26/24 1029    insulin regular (HumuLIN R,NovoLIN R) injection  2-9 Units Subcutaneous Q6HRS Alejandro Mcbride M.D.        And    dextrose 50% (D50W) injection 25 g  25 g Intravenous Q15 MIN PRN Alejandro Mcbride M.D.        Linezolid (Zyvox) premix 600 mg  600 mg Intravenous Q12HRS Tino Romano Jr., D.O.   Stopped at 01/26/24 0658    insulin GLARGINE (Lantus,Semglee) injection  36 Units Subcutaneous Q EVENING Tino Romano Jr., D.O.   36 Units at 01/25/24 1916    atorvastatin (Lipitor) tablet 80 mg  80 mg Oral Q EVENING Tino Romano Jr. D.O.   80 mg at 01/25/24 1848    omeprazole (PriLOSEC) capsule 20 mg  20 mg Oral QAM Tino Romano Jr., D.O.   20 mg at 01/26/24 0540    senna-docusate (Pericolace Or Senokot S)  8.6-50 MG per tablet 2 Tablet  2 Tablet Oral BID Tino Romano Jr. D.OAsiya        And    polyethylene glycol/lytes (Miralax) Packet 1 Packet  1 Packet Oral QDAY PRN JD Doss Jr..OAsiya        And    bisacodyl (Dulcolax) suppository 10 mg  10 mg Rectal QDAY PRN JD Doss Jr..O.        heparin injection 5,000 Units  5,000 Units Subcutaneous Q8HRS JD Doss Jr..O.   5,000 Units at 01/26/24 1327    ondansetron (Zofran) syringe/vial injection 4 mg  4 mg Intravenous Q4HRS PRN JD Doss Jr..O.        ondansetron (Zofran ODT) dispertab 4 mg  4 mg Oral Q4HRS PRN JD Doss Jr..O.        promethazine (Phenergan) tablet 12.5-25 mg  12.5-25 mg Oral Q4HRS PRN JD Doss Jr..O.        promethazine (Phenergan) suppository 12.5-25 mg  12.5-25 mg Rectal Q4HRS PRN JD Doss Jr..O.        prochlorperazine (Compazine) injection 5-10 mg  5-10 mg Intravenous Q4HRS PRN JD Doss Jr..O.        acetaminophen (Tylenol) tablet 650 mg  650 mg Oral Q6HRS PRN JD Doss Jr..O.        LR (Bolus) infusion 500 mL  500 mL Intravenous Once PRN JD Doss Jr..O.        piperacillin-tazobactam (Zosyn) 4.5 g in  mL IVPB  4.5 g Intravenous Q12HRS JD Doss Jr..O.   Stopped at 01/26/24 1120    norepinephrine (Levophed) 8 mg in 250 mL NS infusion (premix)  0-1 mcg/kg/min (Ideal) Intravenous Continuous JD Doss Jr..O.   Dose not Required at 01/25/24 1730    And    vasopressin (Vasostrict) 20 Units in  mL Infusion  0.03 Units/min Intravenous Continuous Tino M Juan Antonio Jr., D.O.   Dose not Required at 01/25/24 1730    aspirin EC tablet 81 mg  81 mg Oral DAILY JD Doss Jr..O.   81 mg at 01/26/24 0540    cholestyramine (Questran) 4 GM powder 4 g  1 Packet Oral BID Tino Romano Jr. D.O.   4 g at 01/26/24 0748       Allergies  No Known Allergies    Physical Exam  Temp:  [36.7 °C (98 °F)-37.4 °C (99.4 °F)] 37.4 °C (99.4 °F)  Pulse:   [66-85] 83  Resp:  [9-34] 17  BP: ()/(50-60) 106/55  SpO2:  [95 %-100 %] 97 %    Physical Exam  Constitutional:       General: He is not in acute distress.     Appearance: He is well-developed. He is not diaphoretic.   HENT:      Head: Normocephalic and atraumatic.      Mouth/Throat:      Mouth: Mucous membranes are moist.      Pharynx: Oropharynx is clear. No oropharyngeal exudate.   Eyes:      General: No scleral icterus.     Extraocular Movements: Extraocular movements intact.   Neck:      Trachea: No tracheal deviation.   Cardiovascular:      Rate and Rhythm: Normal rate.      Heart sounds: Normal heart sounds. No murmur heard.  Pulmonary:      Effort: Pulmonary effort is normal.      Breath sounds: Normal breath sounds. No stridor. No rales.   Abdominal:      General: There is no distension.      Palpations: Abdomen is soft.      Tenderness: There is no abdominal tenderness.   Musculoskeletal:         General: Normal range of motion.      Right lower leg: No edema.      Left lower leg: No edema.   Skin:     General: Skin is warm and dry.      Findings: Erythema (left foot) present.   Neurological:      General: No focal deficit present.      Mental Status: He is alert and oriented to person, place, and time.   Psychiatric:         Mood and Affect: Mood normal.         Behavior: Behavior normal.         Fluids  Date 01/26/24 0700 - 01/27/24 0659   Shift 9510-7541 0268-3879 2547-1092 24 Hour Total   INTAKE   I.V. 233.5   233.5   IV Piggyback 592.3   592.3   Shift Total 825.8   825.8   OUTPUT   Urine 420   420   Shift Total 420   420   Weight (kg) 94.3 94.3 94.3 94.3       Laboratory  Labs reviewed, pertinent labs below.  Recent Labs     01/25/24  1524 01/26/24  0640   WBC 24.7* 19.3*   RBC 2.94* 2.70*   HEMOGLOBIN 8.5* 7.7*   HEMATOCRIT 25.7* 25.3*   MCV 87.4 93.7   MCH 28.9 28.5   MCHC 33.1 30.4*   RDW 45.8 48.8   PLATELETCT 320 305   MPV 9.9 9.7     Recent Labs     01/25/24  1524 01/26/24  0640   SODIUM  131* 131*   POTASSIUM 4.7 4.2   CHLORIDE 104 107   CO2 11* 10*   GLUCOSE 186* 100*   BUN 72* 80*   CREATININE 6.36* 6.42*   CALCIUM 7.3* 6.8*     Recent Labs     01/25/24  1524   INR 1.20*            URINALYSIS:  Lab Results   Component Value Date/Time    COLORURINE Yellow 01/09/2024 2142    CLARITY Clear 01/09/2024 2142    SPECGRAVITY 1.013 01/09/2024 2142    PHURINE 5.0 01/09/2024 2142    KETONES Negative 01/09/2024 2142    PROTEINURIN 300 (A) 01/09/2024 2142    BILIRUBINUR Negative 01/09/2024 2142    UROBILU 0.2 01/09/2024 2142    NITRITE Negative 01/09/2024 2142    LEUKESTERAS Negative 01/09/2024 2142    OCCULTBLOOD Small (A) 01/09/2024 2142     [employer] [facility] [employer]  Lab Results   Component Value Date/Time    TOTPROTUR 258.0 (H) 05/03/2023 1210      Lab Results   Component Value Date/Time    CREATININEU 27.57 05/03/2023 1210       Imaging interpreted by radiologist. Imaging reports reviewed with pertinent findings below  CT-FOOT W/O PLUS RECONS LEFT   Final Result      1.  Findings are consistent with osteomyelitis and pathologic fracture involving the proximal phalanx of the fifth digit.      2.  Soft tissue swelling and emphysema is present around the fifth digit and in the plantar soft tissues of the foot. These findings are consistent with cellulitis.      DX-FOOT-COMPLETE 3+ LEFT   Final Result      1.  Comminuted fracture proximal phalanx fifth digit is identified which could be pathologic and related to osteomyelitis.      2.  Soft tissue swelling and emphysema in the fifth digit which could be due to cellulitis.      DX-CHEST-PORTABLE (1 VIEW)   Final Result         No acute cardiac or pulmonary abnormality is identified.        Kidney biopsy 3/29/2022 interpreted by Dr. Ac  Diagnosis: Chronic glomerular microangiopathic changes.  Nodular diabetic glomerulosclerosis.  Comment: In light of the clinical history, the microangiopathic changes are likely secondary to anti-VEGF inhibitor use.  Other causes of chronic  thrombotic microangiopathy are also on the differential.  There are no acute thrombi.  Light microscopy notable for approximately 50% interstitial fibrosis and tubular atrophy as well as evidence of acute tubular injury      Assessment/Plan  53 y.o. 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    1.  AXEL on CKD 3B versus progression of CKD.  CKD due to biopsy-proven diabetic nephropathy and chronic thrombotic microangiopathy (TMA).  Patient is nonoliguric.  Patient was hypotensive on this admission, which could have led to some degree of AXEL.  Recommend volume resuscitation with bicarbonate infusion given concurrent metabolic acidosis.  There is no emergent need for dialysis, but he is approaching this need if metabolic acidosis cannot be managed medically or if uremic symptoms persist.  Check hepatitis panel and quant Farren gold in case patient progresses to needing dialysis.  Avoid NSAIDs and other nephrotoxins.  Check renal function panel daily.    2.  Hyponatremia, likely due to some excess water intake.  Restrict oral fluids to 1 L daily.  Do not order salt tabs.  Check renal function panel daily.    3.  Metabolic acidosis, due to worsening kidney disease.  Recommend sodium bicarbonate infusion of 150 mEq in 1 L of D5W at a rate of 125 mL/h.  Check renal function panel at least once daily.    4.  Azotemia, with some uremic symptoms.  I am hopeful this improves with volume resuscitation, but patient might need to start dialysis if uremic symptoms persist.    5.  Hypoalbuminemia, due to diabetic nephropathy.  I encourage vegetarian protein intake is much as possible.  Check renal function panel daily.    6.  Left diabetic foot infection, agree with orthopedic surgery consultation.  Patient might need amputation.    7.  Anemia of chronic disease, slightly worsening.   Iron studies are equivocal.  Avoid IV iron in the setting of active infection.  I will recommend a dose of subcutaneous Epogen 10,000 units on Saturday.  Check CBC daily.    Discussed with Dr. Alejandro Ortiz MD  Nephrology  [facility]

## 2024-01-26 NOTE — PROGRESS NOTES
"Critical Care Progress Note    Date of admission  1/25/2024    Chief Complaint  53 y.o. male admitted 1/25/2024 with septic shock secondary to diabetic soft tissue infection left foot     Hospital Course  \"53 y.o. 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 The patient reports his lost consciousness 2 weeks ago (1/9/24) and hit his foot, he was admitted at that time but not imaging of his foot was obtained. Yesterday he states his foot was fine however when he looked at it today he noted his 5th toe was bleeding and he prodded it with a Q-tip which made it bleed more and had some purulent discharge. In the ED he was noted to be hypotensive and was given a sepsis bolus with some improvement. His WBC was found to be significantly elevated at 24.7 and a XR showed gas in the soft tissues. Ortho was consulted (Imelda) and a CT was requested. His metabolic panel shows acute on chronic kidney disease with a Cr of 6.36 (baseline ~3). The patient will be admitted to the ICU for septic shock.\"    1/25-admitted with sepsis, AXEL secondary to diabetic left toe infection/osteo-  1/26-received 3.1L crystalloid, poor urine output, starting bicarb drip, nephrology consultation, Ortho will proceed with amputation when clinically stabilized, wound care following    Interval Problem Update  Reviewed last 24 hour events:  L foot purulent drainage  Rec'd 3.1 L IVF sepsis bolus  SR   - 110's, no pressor  Room air  NPO  I/O = 3.1/50  Home ppi  Zyvox, zosyn  Replace Mg      Review of Systems  Review of Systems   Unable to perform ROS: Acuity of condition        Vital Signs for last 24 hours   Temp:  [36.7 °C (98 °F)-37.1 °C (98.8 °F)] 36.8 °C (98.2 °F)  Pulse:  [66-81] 80  Resp:  [9-34] 17  BP: ()/(50-59) 107/58  SpO2:  [95 %-100 %] 96 %    Hemodynamic parameters for last 24 hours   "     Respiratory Information for the last 24 hours       Physical Exam   Physical Exam  Vitals and nursing note reviewed.   Constitutional:       General: He is not in acute distress.     Appearance: Normal appearance. He is well-developed and normal weight. He is ill-appearing.   HENT:      Head: Normocephalic and atraumatic.      Right Ear: External ear normal.      Left Ear: External ear normal.      Nose: Nose normal.      Mouth/Throat:      Mouth: Mucous membranes are dry.   Eyes:      Extraocular Movements: Extraocular movements intact.      Conjunctiva/sclera: Conjunctivae normal.   Neck:      Vascular: No JVD.      Trachea: No tracheal deviation.   Cardiovascular:      Rate and Rhythm: Normal rate and regular rhythm.      Pulses: Normal pulses.   Pulmonary:      Effort: Pulmonary effort is normal.      Breath sounds: Normal breath sounds. No rales.   Abdominal:      General: Bowel sounds are normal. There is no distension.      Palpations: Abdomen is soft.   Musculoskeletal:         General: No tenderness.      Cervical back: Neck supple.      Right lower leg: No edema.      Left lower leg: No edema.      Comments: Right transmetatarsal amputation, well healed   Skin:     General: Skin is warm and dry.      Capillary Refill: Capillary refill takes less than 2 seconds.      Findings: Erythema present. No rash.      Comments: Hemorrhagic bulla of the plantar surface of the left foot, sanguinous discharge on the medial left 5th toe.   Neurological:      General: No focal deficit present.      Mental Status: He is alert and oriented to person, place, and time.      Cranial Nerves: No cranial nerve deficit.      Sensory: Sensory deficit (chronic) present.      Motor: No weakness.   Psychiatric:         Mood and Affect: Mood normal.         Behavior: Behavior normal.         Medications  Current Facility-Administered Medications   Medication Dose Route Frequency Provider Last Rate Last Admin    Linezolid (Zyvox)  premix 600 mg  600 mg Intravenous Q12HRS Tino Romano Jr. D.O. 300 mL/hr at 01/26/24 0558 600 mg at 01/26/24 0558    insulin GLARGINE (Lantus,Semglee) injection  36 Units Subcutaneous Q EVENING Tino Romano Jr., D.O.   36 Units at 01/25/24 1916    atorvastatin (Lipitor) tablet 80 mg  80 mg Oral Q EVENING Tino Romano Jr., D.O.   80 mg at 01/25/24 1848    omeprazole (PriLOSEC) capsule 20 mg  20 mg Oral QAM Tino Romano Jr., D.O.   20 mg at 01/26/24 0540    sodium bicarbonate tablet 650 mg  650 mg Oral TID Tino Romano Jr. D.O.   650 mg at 01/26/24 0542    senna-docusate (Pericolace Or Senokot S) 8.6-50 MG per tablet 2 Tablet  2 Tablet Oral BID Tino Romano Jr., D.OAsiya        And    polyethylene glycol/lytes (Miralax) Packet 1 Packet  1 Packet Oral QDAY PRN Tino Romano Jr., D.O.        And    bisacodyl (Dulcolax) suppository 10 mg  10 mg Rectal QDAY PRN Tino Romano Jr., JD.O.        heparin injection 5,000 Units  5,000 Units Subcutaneous Q8HRS Tino Romano Jr., D.O.   5,000 Units at 01/26/24 0606    ondansetron (Zofran) syringe/vial injection 4 mg  4 mg Intravenous Q4HRS PRN JD Doss Jr..O.        ondansetron (Zofran ODT) dispertab 4 mg  4 mg Oral Q4HRS PRN Tino Romano Jr., D.O.        promethazine (Phenergan) tablet 12.5-25 mg  12.5-25 mg Oral Q4HRS PRN JD Doss Jr..O.        promethazine (Phenergan) suppository 12.5-25 mg  12.5-25 mg Rectal Q4HRS PRN JD Doss Jr..O.        prochlorperazine (Compazine) injection 5-10 mg  5-10 mg Intravenous Q4HRS PRN JD Doss Jr..O.        acetaminophen (Tylenol) tablet 650 mg  650 mg Oral Q6HRS PRN JD Doss Jr..O.        insulin regular (HumuLIN R,NovoLIN R) injection  3-14 Units Subcutaneous Q6HRS JD Doss Jr..O.   3 Units at 01/25/24 1912    And    dextrose 50% (D50W) injection 25 g  25 g Intravenous Q15 MIN PRN JD Doss Jr..O.        LR (Bolus) infusion 500 mL  500 mL Intravenous  Once PRN Tino Romano Jr. D.O.        piperacillin-tazobactam (Zosyn) 4.5 g in  mL IVPB  4.5 g Intravenous Q12HRS JD Doss Jr..O.   Stopped at 01/25/24 2242    norepinephrine (Levophed) 8 mg in 250 mL NS infusion (premix)  0-1 mcg/kg/min (Ideal) Intravenous Continuous Tino Romano Jr. D.O.   Dose not Required at 01/25/24 1730    And    vasopressin (Vasostrict) 20 Units in  mL Infusion  0.03 Units/min Intravenous Continuous Tino Romano Jr. D.O.   Dose not Required at 01/25/24 1730    aspirin EC tablet 81 mg  81 mg Oral DAILY Tino Romano Jr. D.O.   81 mg at 01/26/24 0540    cholestyramine (Questran) 4 GM powder 4 g  1 Packet Oral BID Tino Romano Jr. D.O.   4 g at 01/25/24 2033       Fluids    Intake/Output Summary (Last 24 hours) at 1/26/2024 0702  Last data filed at 1/26/2024 0200  Gross per 24 hour   Intake 401.32 ml   Output 50 ml   Net 351.32 ml       Laboratory          Recent Labs     01/25/24  1524   SODIUM 131*   POTASSIUM 4.7   CHLORIDE 104   CO2 11*   BUN 72*   CREATININE 6.36*   CALCIUM 7.3*     Recent Labs     01/25/24  1524   ALTSGPT 10   ASTSGOT 19   ALKPHOSPHAT 72   TBILIRUBIN 0.5   GLUCOSE 186*     Recent Labs     01/25/24  1524   WBC 24.7*   NEUTSPOLYS 87.40*   LYMPHOCYTES 5.20*   MONOCYTES 5.50   EOSINOPHILS 0.10   BASOPHILS 0.20   ASTSGOT 19   ALTSGPT 10   ALKPHOSPHAT 72   TBILIRUBIN 0.5     Recent Labs     01/25/24  1524   RBC 2.94*   HEMOGLOBIN 8.5*   HEMATOCRIT 25.7*   PLATELETCT 320   PROTHROMBTM 15.4*   INR 1.20*       Imaging  X-Ray:  I have personally reviewed the images and compared with prior images.    Assessment/Plan  * Septic shock (HCC)- (present on admission)  Assessment & Plan  Secondary to diabetic toe/foot infection/osteomyelitis  Continue broad-spectrum antibiotics  Ongoing resuscitation with crystalloid and bicarbonate  Vasopressor as needed  Follow-up lactate    Soft tissue infection of foot- (present on admission)  Assessment &  Plan  Left fifth toe/foot wound infection with osteomyelitis and pathologic fracture  Continue broad-spectrum polymicrobial coverage with zosyn and zyvox  CT reviewed  Discussed with wound care team and orthopedics team, plan for amputation when clinically stabilized  N.p.o. after midnight tonight    Type 2 diabetes mellitus with kidney complication, with long-term current use of insulin (HCC)- (present on admission)  Assessment & Plan  Goal blood glucose 140-180  Hypoglycemic currently, hold glargine, continue sliding scale insulin, accuchecks  hypoglycemia protocol  A1c 7.2 two weeks ago    Acute renal failure superimposed on stage 3b chronic kidney disease (HCC)- (present on admission)  Assessment & Plan  Likely ATN due to sepsis overlying diabetic nephropathy  Follows with [facility]  Aggressive crystalloid resuscitation  Renal dose meds, avoid nephrotoxins  Strict I/Os, Place Farrell catheter  For urine output, ongoing metabolic acidosis  Initiate sodium bicarb and infusion  Follow electrolytes closely  May require RRT, nephrology kindly consulting    Normocytic anemia- (present on admission)  Assessment & Plan  Chronic, follow  Restrictive transfusion strategy         I have performed a physical exam and reviewed and updated ROS and Plan today (1/26/2024). In review of yesterday's note (1/25/2024), there are no changes except as documented above.     Discussed patient condition and risk of morbidity and/or mortality with RN, RT, Pharmacy, QA team, and Ortho, Nephro  The patient remains critically ill.  Critical care time = 55 minutes in directly providing and coordinating critical care and extensive data review.  No time overlap and excludes procedures.

## 2024-01-26 NOTE — ED NOTES
Med Rec complete per patient   Allergies reviewed  Antibiotics in the past 30 days:no  Anticoagulant in past 14 days:no  Pharmacy patient utilizes:[facility] in [place]    Pt states he missed his eye injection appointment for this month

## 2024-01-26 NOTE — CARE PLAN
Problem: Pain - Standard  Goal: Alleviation of pain or a reduction in pain to the patient’s comfort goal  Note: Pain assessed q2 hours   The patient is Watcher - Medium risk of patient condition declining or worsening

## 2024-01-27 ENCOUNTER — APPOINTMENT (OUTPATIENT)
Dept: RADIOLOGY | Facility: MEDICAL CENTER | Age: 54
DRG: 853 | End: 2024-01-27
Attending: INTERNAL MEDICINE
Payer: MEDICARE

## 2024-01-27 LAB
ABO + RH BLD: NORMAL
ABO GROUP BLD: NORMAL
ALBUMIN SERPL BCP-MCNC: 1.6 G/DL (ref 3.2–4.9)
ALBUMIN/GLOB SERPL: 0.6 G/DL
ALP SERPL-CCNC: 56 U/L (ref 30–99)
ALT SERPL-CCNC: 9 U/L (ref 2–50)
ANION GAP SERPL CALC-SCNC: 13 MMOL/L (ref 7–16)
ANION GAP SERPL CALC-SCNC: 13 MMOL/L (ref 7–16)
AST SERPL-CCNC: 20 U/L (ref 12–45)
BARCODED ABORH UBTYP: 6200
BARCODED PRD CODE UBPRD: NORMAL
BARCODED UNIT NUM UBUNT: NORMAL
BASOPHILS # BLD AUTO: 0.1 % (ref 0–1.8)
BASOPHILS # BLD: 0.02 K/UL (ref 0–0.12)
BILIRUB SERPL-MCNC: 0.4 MG/DL (ref 0.1–1.5)
BLD GP AB SCN SERPL QL: NORMAL
BUN SERPL-MCNC: 72 MG/DL (ref 8–22)
BUN SERPL-MCNC: 77 MG/DL (ref 8–22)
CA-I SERPL-SCNC: 1 MMOL/L (ref 1.1–1.3)
CALCIUM ALBUM COR SERPL-MCNC: 8.5 MG/DL (ref 8.5–10.5)
CALCIUM SERPL-MCNC: 6.6 MG/DL (ref 8.5–10.5)
CALCIUM SERPL-MCNC: 7 MG/DL (ref 8.5–10.5)
CFT BLD TEG: 5.7 MIN (ref 4.6–9.1)
CFT P HPASE BLD TEG: 5.9 MIN (ref 4.3–8.3)
CHLORIDE SERPL-SCNC: 101 MMOL/L (ref 96–112)
CHLORIDE SERPL-SCNC: 104 MMOL/L (ref 96–112)
CLOT ANGLE BLD TEG: 77.5 DEGREES (ref 63–78)
CLOT LYSIS 30M P MA LENFR BLD TEG: 0 % (ref 0–2.6)
CO2 SERPL-SCNC: 14 MMOL/L (ref 20–33)
CO2 SERPL-SCNC: 15 MMOL/L (ref 20–33)
COMPONENT R 8504R: NORMAL
CREAT SERPL-MCNC: 5.85 MG/DL (ref 0.5–1.4)
CREAT SERPL-MCNC: 6.22 MG/DL (ref 0.5–1.4)
CT.EXTRINSIC BLD ROTEM: 0.8 MIN (ref 0.8–2.1)
EOSINOPHIL # BLD AUTO: 0.03 K/UL (ref 0–0.51)
EOSINOPHIL NFR BLD: 0.1 % (ref 0–6.9)
ERYTHROCYTE [DISTWIDTH] IN BLOOD BY AUTOMATED COUNT: 45.3 FL (ref 35.9–50)
FIBRINOGEN PPP-MCNC: 472 MG/DL (ref 215–460)
GFR SERPLBLD CREATININE-BSD FMLA CKD-EPI: 10 ML/MIN/1.73 M 2
GFR SERPLBLD CREATININE-BSD FMLA CKD-EPI: 11 ML/MIN/1.73 M 2
GLOBULIN SER CALC-MCNC: 2.8 G/DL (ref 1.9–3.5)
GLUCOSE BLD STRIP.AUTO-MCNC: 227 MG/DL (ref 65–99)
GLUCOSE BLD STRIP.AUTO-MCNC: 229 MG/DL (ref 65–99)
GLUCOSE BLD STRIP.AUTO-MCNC: 267 MG/DL (ref 65–99)
GLUCOSE SERPL-MCNC: 195 MG/DL (ref 65–99)
GLUCOSE SERPL-MCNC: 231 MG/DL (ref 65–99)
HCT VFR BLD AUTO: 20.6 % (ref 42–52)
HGB BLD-MCNC: 7 G/DL (ref 14–18)
IMM GRANULOCYTES # BLD AUTO: 0.45 K/UL (ref 0–0.11)
IMM GRANULOCYTES NFR BLD AUTO: 2.2 % (ref 0–0.9)
INR PPP: 1.4 (ref 0.87–1.13)
LYMPHOCYTES # BLD AUTO: 1.5 K/UL (ref 1–4.8)
LYMPHOCYTES NFR BLD: 7.2 % (ref 22–41)
MAGNESIUM SERPL-MCNC: 1.8 MG/DL (ref 1.5–2.5)
MCF BLD TEG: 65.6 MM (ref 52–69)
MCF.PLATELET INHIB BLD ROTEM: 32.1 MM (ref 15–32)
MCH RBC QN AUTO: 29.7 PG (ref 27–33)
MCHC RBC AUTO-ENTMCNC: 34 G/DL (ref 32.3–36.5)
MCV RBC AUTO: 87.3 FL (ref 81.4–97.8)
MONOCYTES # BLD AUTO: 1.11 K/UL (ref 0–0.85)
MONOCYTES NFR BLD AUTO: 5.3 % (ref 0–13.4)
NEUTROPHILS # BLD AUTO: 17.69 K/UL (ref 1.82–7.42)
NEUTROPHILS NFR BLD: 85.1 % (ref 44–72)
NRBC # BLD AUTO: 0 K/UL
NRBC BLD-RTO: 0 /100 WBC (ref 0–0.2)
PA AA BLD-ACNC: ABNORMAL % (ref 0–11)
PA ADP BLD-ACNC: ABNORMAL % (ref 0–17)
PHOSPHATE SERPL-MCNC: 3.9 MG/DL (ref 2.5–4.5)
PLATELET # BLD AUTO: 298 K/UL (ref 164–446)
PMV BLD AUTO: 9.8 FL (ref 9–12.9)
POTASSIUM SERPL-SCNC: 3.7 MMOL/L (ref 3.6–5.5)
POTASSIUM SERPL-SCNC: 3.8 MMOL/L (ref 3.6–5.5)
PRODUCT TYPE UPROD: NORMAL
PROT SERPL-MCNC: 4.4 G/DL (ref 6–8.2)
PROTHROMBIN TIME: 17.3 SEC (ref 12–14.6)
RBC # BLD AUTO: 2.36 M/UL (ref 4.7–6.1)
RH BLD: NORMAL
SODIUM SERPL-SCNC: 129 MMOL/L (ref 135–145)
SODIUM SERPL-SCNC: 131 MMOL/L (ref 135–145)
TEG ALGORITHM TGALG: ABNORMAL
UNIT STATUS USTAT: NORMAL
WBC # BLD AUTO: 20.8 K/UL (ref 4.8–10.8)

## 2024-01-27 PROCEDURE — 99152 MOD SED SAME PHYS/QHP 5/>YRS: CPT

## 2024-01-27 PROCEDURE — 85610 PROTHROMBIN TIME: CPT

## 2024-01-27 PROCEDURE — 85384 FIBRINOGEN ACTIVITY: CPT | Mod: 91

## 2024-01-27 PROCEDURE — 99291 CRITICAL CARE FIRST HOUR: CPT | Performed by: INTERNAL MEDICINE

## 2024-01-27 PROCEDURE — 700102 HCHG RX REV CODE 250 W/ 637 OVERRIDE(OP): Performed by: INTERNAL MEDICINE

## 2024-01-27 PROCEDURE — C1751 CATH, INF, PER/CENT/MIDLINE: HCPCS

## 2024-01-27 PROCEDURE — 86850 RBC ANTIBODY SCREEN: CPT

## 2024-01-27 PROCEDURE — 80053 COMPREHEN METABOLIC PANEL: CPT

## 2024-01-27 PROCEDURE — 82962 GLUCOSE BLOOD TEST: CPT | Mod: 91

## 2024-01-27 PROCEDURE — 700105 HCHG RX REV CODE 258: Performed by: INTERNAL MEDICINE

## 2024-01-27 PROCEDURE — 84100 ASSAY OF PHOSPHORUS: CPT

## 2024-01-27 PROCEDURE — 80048 BASIC METABOLIC PNL TOTAL CA: CPT

## 2024-01-27 PROCEDURE — 770022 HCHG ROOM/CARE - ICU (200)

## 2024-01-27 PROCEDURE — 85025 COMPLETE CBC W/AUTO DIFF WBC: CPT

## 2024-01-27 PROCEDURE — 86923 COMPATIBILITY TEST ELECTRIC: CPT

## 2024-01-27 PROCEDURE — 82330 ASSAY OF CALCIUM: CPT

## 2024-01-27 PROCEDURE — P9016 RBC LEUKOCYTES REDUCED: HCPCS

## 2024-01-27 PROCEDURE — 99232 SBSQ HOSP IP/OBS MODERATE 35: CPT | Performed by: INTERNAL MEDICINE

## 2024-01-27 PROCEDURE — 71045 X-RAY EXAM CHEST 1 VIEW: CPT

## 2024-01-27 PROCEDURE — 700111 HCHG RX REV CODE 636 W/ 250 OVERRIDE (IP): Performed by: INTERNAL MEDICINE

## 2024-01-27 PROCEDURE — 85576 BLOOD PLATELET AGGREGATION: CPT | Mod: 91

## 2024-01-27 PROCEDURE — 700111 HCHG RX REV CODE 636 W/ 250 OVERRIDE (IP): Mod: JZ | Performed by: INTERNAL MEDICINE

## 2024-01-27 PROCEDURE — 85347 COAGULATION TIME ACTIVATED: CPT

## 2024-01-27 PROCEDURE — 85384 FIBRINOGEN ACTIVITY: CPT

## 2024-01-27 PROCEDURE — 36556 INSERT NON-TUNNEL CV CATH: CPT

## 2024-01-27 PROCEDURE — 86900 BLOOD TYPING SEROLOGIC ABO: CPT

## 2024-01-27 PROCEDURE — A9270 NON-COVERED ITEM OR SERVICE: HCPCS | Performed by: INTERNAL MEDICINE

## 2024-01-27 PROCEDURE — 86901 BLOOD TYPING SEROLOGIC RH(D): CPT

## 2024-01-27 PROCEDURE — 83735 ASSAY OF MAGNESIUM: CPT

## 2024-01-27 PROCEDURE — 36430 TRANSFUSION BLD/BLD COMPNT: CPT

## 2024-01-27 PROCEDURE — 30233N1 TRANSFUSION OF NONAUTOLOGOUS RED BLOOD CELLS INTO PERIPHERAL VEIN, PERCUTANEOUS APPROACH: ICD-10-PCS | Performed by: INTERNAL MEDICINE

## 2024-01-27 RX ORDER — MAGNESIUM SULFATE HEPTAHYDRATE 40 MG/ML
2 INJECTION, SOLUTION INTRAVENOUS ONCE
Status: COMPLETED | OUTPATIENT
Start: 2024-01-27 | End: 2024-01-27

## 2024-01-27 RX ORDER — MIDAZOLAM HYDROCHLORIDE 1 MG/ML
1-5 INJECTION INTRAMUSCULAR; INTRAVENOUS
Status: COMPLETED | OUTPATIENT
Start: 2024-01-27 | End: 2024-01-27

## 2024-01-27 RX ADMIN — LINEZOLID 600 MG: 600 INJECTION, SOLUTION INTRAVENOUS at 05:51

## 2024-01-27 RX ADMIN — LORAZEPAM 1 MG: 2 INJECTION INTRAMUSCULAR; INTRAVENOUS at 15:56

## 2024-01-27 RX ADMIN — MAGNESIUM SULFATE HEPTAHYDRATE 2 G: 2 INJECTION, SOLUTION INTRAVENOUS at 09:13

## 2024-01-27 RX ADMIN — INSULIN HUMAN 3 UNITS: 100 INJECTION, SOLUTION PARENTERAL at 13:27

## 2024-01-27 RX ADMIN — INSULIN HUMAN 5 UNITS: 100 INJECTION, SOLUTION PARENTERAL at 20:05

## 2024-01-27 RX ADMIN — LINEZOLID 600 MG: 600 INJECTION, SOLUTION INTRAVENOUS at 18:01

## 2024-01-27 RX ADMIN — CALCIUM CHLORIDE 1000 MG: 100 INJECTION, SOLUTION INTRAVENOUS at 08:28

## 2024-01-27 RX ADMIN — ATORVASTATIN CALCIUM 80 MG: 40 TABLET, FILM COATED ORAL at 18:03

## 2024-01-27 RX ADMIN — OMEPRAZOLE 20 MG: 20 CAPSULE, DELAYED RELEASE ORAL at 05:46

## 2024-01-27 RX ADMIN — CHOLESTYRAMINE 4 G: 4 POWDER, FOR SUSPENSION ORAL at 20:07

## 2024-01-27 RX ADMIN — EPOETIN ALFA-EPBX 10000 UNITS: 10000 INJECTION, SOLUTION INTRAVENOUS; SUBCUTANEOUS at 13:21

## 2024-01-27 RX ADMIN — SODIUM BICARBONATE: 84 INJECTION, SOLUTION INTRAVENOUS at 18:06

## 2024-01-27 RX ADMIN — SODIUM BICARBONATE: 84 INJECTION, SOLUTION INTRAVENOUS at 05:35

## 2024-01-27 RX ADMIN — PIPERACILLIN AND TAZOBACTAM 4.5 G: 4; .5 INJECTION, POWDER, FOR SOLUTION INTRAVENOUS at 17:59

## 2024-01-27 RX ADMIN — PIPERACILLIN AND TAZOBACTAM 4.5 G: 4; .5 INJECTION, POWDER, FOR SOLUTION INTRAVENOUS at 05:54

## 2024-01-27 RX ADMIN — INSULIN HUMAN 2 UNITS: 100 INJECTION, SOLUTION PARENTERAL at 02:27

## 2024-01-27 RX ADMIN — INSULIN HUMAN 3 UNITS: 100 INJECTION, SOLUTION PARENTERAL at 08:53

## 2024-01-27 RX ADMIN — ASPIRIN 81 MG: 81 TABLET, COATED ORAL at 05:46

## 2024-01-27 RX ADMIN — MIDAZOLAM HYDROCHLORIDE 2 MG: 1 INJECTION, SOLUTION INTRAMUSCULAR; INTRAVENOUS at 00:42

## 2024-01-27 ASSESSMENT — ENCOUNTER SYMPTOMS
SHORTNESS OF BREATH: 0
FEVER: 0
ABDOMINAL PAIN: 0

## 2024-01-27 ASSESSMENT — FIBROSIS 4 INDEX: FIB4 SCORE: 1.2

## 2024-01-27 NOTE — CARE PLAN
The patient is Watcher - Medium risk of patient condition declining or worsening    Shift Goals  Clinical Goals: surgery, hemodymaic stability  Patient Goals: comfort, sleep  Problem: Knowledge Deficit - Standard  Goal: Patient and family/care givers will demonstrate understanding of plan of care, disease process/condition, diagnostic tests and medications  Outcome: Progressing     Problem: Hemodynamics  Goal: Patient's hemodynamics, fluid balance and neurologic status will be stable or improve  Outcome: Progressing     Problem: Fluid Volume  Goal: Fluid volume balance will be maintained  Outcome: Progressing     Family Goals: updates

## 2024-01-27 NOTE — CARE PLAN
Problem: Pain - Standard  Goal: Alleviation of pain or a reduction in pain to the patient’s comfort goal  Note: Pain assessed q2 hours   The patient is Watcher - Medium risk of patient condition declining or worsening    Shift Goals  Clinical Goals: pain control, hemodymic stability, serial labs  Patient Goals: comfort, sleep  Family Goals: No family present

## 2024-01-27 NOTE — PROGRESS NOTES
"Critical Care Progress Note    Date of admission  1/25/2024    Chief Complaint  53 y.o. male admitted 1/25/2024 with septic shock secondary to diabetic soft tissue infection left foot     Hospital Course  \"53 y.o. 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 The patient reports his lost consciousness 2 weeks ago (1/9/24) and hit his foot, he was admitted at that time but not imaging of his foot was obtained. Yesterday he states his foot was fine however when he looked at it today he noted his 5th toe was bleeding and he prodded it with a Q-tip which made it bleed more and had some purulent discharge. In the ED he was noted to be hypotensive and was given a sepsis bolus with some improvement. His WBC was found to be significantly elevated at 24.7 and a XR showed gas in the soft tissues. Ortho was consulted (Imelda) and a CT was requested. His metabolic panel shows acute on chronic kidney disease with a Cr of 6.36 (baseline ~3). The patient will be admitted to the ICU for septic shock.\"    1/25-admitted with sepsis, AXEL secondary to diabetic left toe infection/osteo-  1/26-received 3.1L crystalloid, poor urine output, starting bicarb drip, nephrology consultation, Ortho will proceed with amputation when clinically stabilized, wound care following  1/27 -     Interval Problem Update  Reviewed last 24 hour events:  Tmax 101.7  WBC 20.8  Zosyn/linezolid day #3  Cultures: BC x 2 1/25, no growth to date, MRSA nares negative  Hemoglobin 7.0  I/O = 3421/1000  BUN/creatinine 77/6.22  Bicarbonate infusion 125/hour  CO2 14  Albumin 1.6  Room air  A/o x 4  SR  SBP , no pressors  Home PPI  Hold sc heparin        Review of Systems  Review of Systems   Unable to perform ROS: Acuity of condition        Vital Signs for last 24 hours   Temp:  [37.4 °C (99.4 °F)] 37.4 °C (99.4 °F)  Pulse:  " [79-93] 83  Resp:  [12-32] 13  BP: ()/(54-65) 119/65  SpO2:  [94 %-98 %] 97 %    Hemodynamic parameters for last 24 hours       Respiratory Information for the last 24 hours       Physical Exam   Physical Exam  Vitals and nursing note reviewed.   Constitutional:       General: He is not in acute distress.     Appearance: Normal appearance. He is well-developed and normal weight. He is ill-appearing.   HENT:      Head: Normocephalic and atraumatic.      Right Ear: External ear normal.      Left Ear: External ear normal.      Nose: Nose normal.      Mouth/Throat:      Mouth: Mucous membranes are dry.   Eyes:      Extraocular Movements: Extraocular movements intact.      Conjunctiva/sclera: Conjunctivae normal.   Neck:      Vascular: No JVD.      Trachea: No tracheal deviation.   Cardiovascular:      Rate and Rhythm: Normal rate and regular rhythm.      Pulses: Normal pulses.   Pulmonary:      Effort: Pulmonary effort is normal.      Breath sounds: Normal breath sounds. No rales.   Abdominal:      General: Bowel sounds are normal. There is no distension.      Palpations: Abdomen is soft.   Musculoskeletal:         General: No tenderness.      Cervical back: Neck supple.      Right lower leg: No edema.      Left lower leg: No edema.      Comments: Right transmetatarsal amputation, well healed   Skin:     General: Skin is warm and dry.      Capillary Refill: Capillary refill takes less than 2 seconds.      Findings: Erythema present. No rash.      Comments: Hemorrhagic bulla of the plantar surface of the left foot, sanguinous discharge on the medial left 5th toe.   Neurological:      General: No focal deficit present.      Mental Status: He is alert and oriented to person, place, and time.      Cranial Nerves: No cranial nerve deficit.      Sensory: Sensory deficit (chronic) present.      Motor: No weakness.   Psychiatric:         Mood and Affect: Mood normal.         Behavior: Behavior normal.          Medications  Current Facility-Administered Medications   Medication Dose Route Frequency Provider Last Rate Last Admin    sodium bicarbonate 150 mEq in D5W infusion (premix)   Intravenous Continuous Varun Ortiz M.D. 125 mL/hr at 01/27/24 0535 New Bag at 01/27/24 0535    insulin regular (HumuLIN R,NovoLIN R) injection  2-9 Units Subcutaneous Q6HRS Alejandro Mcbride M.D.   2 Units at 01/27/24 0227    And    dextrose 50% (D50W) injection 25 g  25 g Intravenous Q15 MIN PRN Alejandro Mcbride M.D.        epoetin (Retacrit) injection (Non Dialysis Use) 10,000 Units  10,000 Units Subcutaneous Q7 DAYS Varun Ortiz M.D.        LORazepam (Ativan) injection 1 mg  1 mg Intravenous Q6HRS PRN Alejandro Mcbride M.D.        Linezolid (Zyvox) premix 600 mg  600 mg Intravenous Q12HRS Tino Romano Jr., D.O. 300 mL/hr at 01/27/24 0551 600 mg at 01/27/24 0551    atorvastatin (Lipitor) tablet 80 mg  80 mg Oral Q EVENING Tino Romano Jr., D.O.   80 mg at 01/26/24 1719    omeprazole (PriLOSEC) capsule 20 mg  20 mg Oral QAM Tino Romano Jr., D.O.   20 mg at 01/27/24 0546    senna-docusate (Pericolace Or Senokot S) 8.6-50 MG per tablet 2 Tablet  2 Tablet Oral BID Tino Romano Jr., D.O.        And    polyethylene glycol/lytes (Miralax) Packet 1 Packet  1 Packet Oral QDAY PRN Tino Romano Jr., D.O.        And    bisacodyl (Dulcolax) suppository 10 mg  10 mg Rectal QDAY PRN Tino Romano Jr., D.O.        ondansetron (Zofran) syringe/vial injection 4 mg  4 mg Intravenous Q4HRS PRN Tino Romano Jr., D.O.        ondansetron (Zofran ODT) dispertab 4 mg  4 mg Oral Q4HRS PRN Tino Romano Jr., D.O.        promethazine (Phenergan) tablet 12.5-25 mg  12.5-25 mg Oral Q4HRS PRN JD Doss Jr..O.        promethazine (Phenergan) suppository 12.5-25 mg  12.5-25 mg Rectal Q4HRS PRN Tino Romano Jr., JD.O.        prochlorperazine (Compazine) injection 5-10 mg  5-10 mg Intravenous Q4HRS PRN Tino Romano Jr., JD.O.         acetaminophen (Tylenol) tablet 650 mg  650 mg Oral Q6HRS PRN JD Doss Jr..OAsiya        LR (Bolus) infusion 500 mL  500 mL Intravenous Once PRN JD Doss Jr..OAsiya        piperacillin-tazobactam (Zosyn) 4.5 g in  mL IVPB  4.5 g Intravenous Q12HRS JD Doss Jr..O. 25 mL/hr at 01/27/24 0554 4.5 g at 01/27/24 0554    aspirin EC tablet 81 mg  81 mg Oral DAILY JD Doss Jr..O.   81 mg at 01/27/24 0546    cholestyramine (Questran) 4 GM powder 4 g  1 Packet Oral BID JD Doss Jr..OAsiya   4 g at 01/26/24 2020       Fluids    Intake/Output Summary (Last 24 hours) at 1/27/2024 0737  Last data filed at 1/27/2024 0600  Gross per 24 hour   Intake 3421.29 ml   Output 1000 ml   Net 2421.29 ml         Laboratory      Recent Labs     01/26/24  0834   CPKTOTAL 237*     Recent Labs     01/26/24  0640 01/26/24  1727 01/27/24  0540   SODIUM 131* 132* 131*   POTASSIUM 4.2 4.1 3.8   CHLORIDE 107 105 104   CO2 10* 11* 14*   BUN 80* 70* 77*   CREATININE 6.42* 6.28* 6.22*   MAGNESIUM 1.5  --  1.8   PHOSPHORUS 4.8*  --  3.9   CALCIUM 6.8* 6.8* 6.6*       Recent Labs     01/25/24  1524 01/26/24  0640 01/26/24  1727 01/27/24  0540   ALTSGPT 10 10  --  9   ASTSGOT 19 21  --  20   ALKPHOSPHAT 72 61  --  56   TBILIRUBIN 0.5 0.4  --  0.4   GLUCOSE 186* 100* 123* 195*       Recent Labs     01/25/24  1524 01/26/24  0640 01/27/24  0540   WBC 24.7* 19.3* 20.8*   NEUTSPOLYS 87.40* 87.60* 85.10*   LYMPHOCYTES 5.20* 5.00* 7.20*   MONOCYTES 5.50 5.20 5.30   EOSINOPHILS 0.10 0.20 0.10   BASOPHILS 0.20 0.30 0.10   ASTSGOT 19 21 20   ALTSGPT 10 10 9   ALKPHOSPHAT 72 61 56   TBILIRUBIN 0.5 0.4 0.4       Recent Labs     01/25/24  1524 01/26/24  0640 01/27/24  0540   RBC 2.94* 2.70* 2.36*   HEMOGLOBIN 8.5* 7.7* 7.0*   HEMATOCRIT 25.7* 25.3* 20.6*   PLATELETCT 320 305 298   PROTHROMBTM 15.4*  --  17.3*   INR 1.20*  --  1.40*         Imaging  X-Ray:  I have personally reviewed the images and compared with prior  images.    Assessment/Plan  * Septic shock (HCC)- (present on admission)  Assessment & Plan  Secondary to diabetic toe/foot infection/osteomyelitis  Continue broad-spectrum antibiotics  Ongoing resuscitation with crystalloid and bicarbonate  Vasopressor as needed  Follow-up lactate    Soft tissue infection of foot- (present on admission)  Assessment & Plan  Left fifth toe/foot wound infection with osteomyelitis and pathologic fracture  Continue broad-spectrum polymicrobial coverage with zosyn and zyvox  CT reviewed  Discussed with wound care team and orthopedics team, plan for amputation when clinically stabilized  N.p.o. after midnight tonight    Type 2 diabetes mellitus with kidney complication, with long-term current use of insulin ([facility])- (present on admission)  Assessment & Plan  Goal blood glucose 140-180  Hypoglycemic currently, hold glargine, continue sliding scale insulin, accuchecks  hypoglycemia protocol  A1c 7.2 two weeks ago    Acute renal failure superimposed on stage 3b chronic kidney disease (HCC)- (present on admission)  Assessment & Plan  Likely ATN due to sepsis overlying diabetic nephropathy  Follows with [facility]  Aggressive crystalloid resuscitation  Renal dose meds, avoid nephrotoxins  Strict I/Os, Place Farrell catheter  For urine output, ongoing metabolic acidosis  Initiate sodium bicarb and infusion  Follow electrolytes closely  May require RRT, nephrology kindly consulting    Normocytic anemia- (present on admission)  Assessment & Plan  Chronic, follow  Restrictive transfusion strategy       Updated plan:  Place CVC  Continue fluid, antibiotics, pressors as needed  Wound care, surgery following, okay for amputation at this point, pending surgical plan  Nephrology following, will save right IJ for dialysis catheter if needed    I have performed a physical exam and reviewed and updated ROS and Plan today (1/27/2024). In review of yesterday's note (1/26/2024), there are no changes  except as documented above.     Discussed patient condition and risk of morbidity and/or mortality with RN, RT, Pharmacy, QA team, and Ortho, Nephro  The patient remains critically ill.  Critical care time = 45 minutes in directly providing and coordinating critical care and extensive data review.  No time overlap and excludes procedures.

## 2024-01-27 NOTE — PROGRESS NOTES
Nephrology Daily Progress Note    Date of Service  1/27/2024    Chief Complaint  53 y.o. 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    Interval Problem Update  1/27 -urine output 1 L in the last 24 hours.  Patient denies chest pain, shortness of breath, headache, nausea, vomiting.  He does have diminished appetite.    Review of Systems  Review of Systems   Constitutional:  Negative for fever.   Respiratory:  Negative for shortness of breath.    Cardiovascular:  Negative for chest pain.   Gastrointestinal:  Negative for abdominal pain.   All other systems reviewed and are negative.       Physical Exam  Temp:  [37.4 °C (99.4 °F)-37.9 °C (100.2 °F)] 37.9 °C (100.2 °F)  Pulse:  [79-93] 88  Resp:  [12-32] 21  BP: ()/(54-65) 122/58  SpO2:  [94 %-98 %] 96 %    Physical Exam  Constitutional:       General: He is not in acute distress.     Appearance: He is well-developed. He is not diaphoretic.   HENT:      Head: Normocephalic and atraumatic.      Mouth/Throat:      Mouth: Mucous membranes are moist.      Pharynx: Oropharynx is clear. No oropharyngeal exudate.   Eyes:      General: No scleral icterus.     Extraocular Movements: Extraocular movements intact.   Neck:      Trachea: No tracheal deviation.   Cardiovascular:      Rate and Rhythm: Normal rate.      Heart sounds: Normal heart sounds. No murmur heard.  Pulmonary:      Effort: Pulmonary effort is normal.      Breath sounds: Normal breath sounds. No stridor. No rales.   Abdominal:      General: There is no distension.      Palpations: Abdomen is soft.      Tenderness: There is no abdominal tenderness.   Musculoskeletal:         General: Normal range of motion.      Right lower leg: Edema (trace) present.      Left lower leg: Edema (1+) present.   Skin:     General: Skin is warm and dry.      Findings: Erythema  (Left foot) and lesion (left foot) present.   Neurological:      General: No focal deficit present.      Mental Status: He is alert and oriented to person, place, and time.   Psychiatric:         Mood and Affect: Mood normal.         Behavior: Behavior normal.         Fluids    Intake/Output Summary (Last 24 hours) at 1/27/2024 1425  Last data filed at 1/27/2024 1200  Gross per 24 hour   Intake 3549.8 ml   Output 890 ml   Net 2659.8 ml       Laboratory  Labs reviewed, pertinent labs below.  Recent Labs     01/25/24  1524 01/26/24  0640 01/27/24  0540   WBC 24.7* 19.3* 20.8*   RBC 2.94* 2.70* 2.36*   HEMOGLOBIN 8.5* 7.7* 7.0*   HEMATOCRIT 25.7* 25.3* 20.6*   MCV 87.4 93.7 87.3   MCH 28.9 28.5 29.7   MCHC 33.1 30.4* 34.0   RDW 45.8 48.8 45.3   PLATELETCT 320 305 298   MPV 9.9 9.7 9.8     Recent Labs     01/26/24  1727 01/27/24  0540 01/27/24  1300   SODIUM 132* 131* 129*   POTASSIUM 4.1 3.8 3.7   CHLORIDE 105 104 101   CO2 11* 14* 15*   GLUCOSE 123* 195* 231*   BUN 70* 77* 72*   CREATININE 6.28* 6.22* 5.85*   CALCIUM 6.8* 6.6* 7.0*     Recent Labs     01/25/24  1524 01/27/24  0540   INR 1.20* 1.40*           URINALYSIS:  Lab Results   Component Value Date/Time    COLORURINE Yellow 01/09/2024 2142    CLARITY Clear 01/09/2024 2142    SPECGRAVITY 1.013 01/09/2024 2142    PHURINE 5.0 01/09/2024 2142    KETONES Negative 01/09/2024 2142    PROTEINURIN 300 (A) 01/09/2024 2142    BILIRUBINUR Negative 01/09/2024 2142    UROBILU 0.2 01/09/2024 2142    NITRITE Negative 01/09/2024 2142    LEUKESTERAS Negative 01/09/2024 2142    OCCULTBLOOD Small (A) 01/09/2024 2142     UPC  Lab Results   Component Value Date/Time    TOTPROTUR 258.0 (H) 05/03/2023 1210      Lab Results   Component Value Date/Time    CREATININEU 27.57 05/03/2023 1210         Imaging interpreted by radiologist. Imaging reports reviewed with pertinent findings below  DX-CHEST-PORTABLE (1 VIEW)   Final Result      CT-FOOT W/O PLUS RECONS LEFT   Final Result      1.   Findings are consistent with osteomyelitis and pathologic fracture involving the proximal phalanx of the fifth digit.      2.  Soft tissue swelling and emphysema is present around the fifth digit and in the plantar soft tissues of the foot. These findings are consistent with cellulitis.      DX-FOOT-COMPLETE 3+ LEFT   Final Result      1.  Comminuted fracture proximal phalanx fifth digit is identified which could be pathologic and related to osteomyelitis.      2.  Soft tissue swelling and emphysema in the fifth digit which could be due to cellulitis.      DX-CHEST-PORTABLE (1 VIEW)   Final Result         No acute cardiac or pulmonary abnormality is identified.            Current Facility-Administered Medications   Medication Dose Route Frequency Provider Last Rate Last Admin    sodium bicarbonate 150 mEq in D5W infusion (premix)   Intravenous Continuous Varun Ortiz M.D. 125 mL/hr at 01/27/24 0535 New Bag at 01/27/24 0535    insulin regular (HumuLIN R,NovoLIN R) injection  2-9 Units Subcutaneous Q6HRS Alejandro Mcbride M.D.   3 Units at 01/27/24 1327    And    dextrose 50% (D50W) injection 25 g  25 g Intravenous Q15 MIN PRN Alejandro Mbcride M.D.        epoetin (Retacrit) injection (Non Dialysis Use) 10,000 Units  10,000 Units Subcutaneous Q7 DAYS Varun Ortiz M.D.   10,000 Units at 01/27/24 1321    LORazepam (Ativan) injection 1 mg  1 mg Intravenous Q6HRS PRN Alejandro Mcbride M.D.        Linezolid (Zyvox) premix 600 mg  600 mg Intravenous Q12HRS Tino Romano Jr., D.O.   Stopped at 01/27/24 0651    atorvastatin (Lipitor) tablet 80 mg  80 mg Oral Q EVENING Tino Romano Jr., D.O.   80 mg at 01/26/24 1719    omeprazole (PriLOSEC) capsule 20 mg  20 mg Oral QAM Tino Romano Jr., D.O.   20 mg at 01/27/24 0546    senna-docusate (Pericolace Or Senokot S) 8.6-50 MG per tablet 2 Tablet  2 Tablet Oral BID Tino Romano Jr., D.O.        And    polyethylene glycol/lytes (Miralax) Packet 1 Packet  1 Packet Oral QDAY PRN  Tino Romano Jr., D.O.        And    bisacodyl (Dulcolax) suppository 10 mg  10 mg Rectal QDAY PRN DJ Doss Jr..O.        ondansetron (Zofran) syringe/vial injection 4 mg  4 mg Intravenous Q4HRS PRN JD Doss Jr..O.        ondansetron (Zofran ODT) dispertab 4 mg  4 mg Oral Q4HRS PRN JD Doss Jr..O.        promethazine (Phenergan) tablet 12.5-25 mg  12.5-25 mg Oral Q4HRS PRN JD Doss Jr..O.        promethazine (Phenergan) suppository 12.5-25 mg  12.5-25 mg Rectal Q4HRS PRN JD Doss Jr..O.        prochlorperazine (Compazine) injection 5-10 mg  5-10 mg Intravenous Q4HRS PRN JD Doss Jr..O.        acetaminophen (Tylenol) tablet 650 mg  650 mg Oral Q6HRS PRN JD Doss Jr..O.        LR (Bolus) infusion 500 mL  500 mL Intravenous Once PRN JD Doss Jr..O.        piperacillin-tazobactam (Zosyn) 4.5 g in  mL IVPB  4.5 g Intravenous Q12HRS JD Doss Jr..O.   Stopped at 01/27/24 0954    aspirin EC tablet 81 mg  81 mg Oral DAILY JD Doss Jr..O.   81 mg at 01/27/24 0546    cholestyramine (Questran) 4 GM powder 4 g  1 Packet Oral BID JD Doss Jr..O.   4 g at 01/26/24 2020       Kidney biopsy 3/29/2022 interpreted by Dr. Ac  Diagnosis: Chronic glomerular microangiopathic changes.  Nodular diabetic glomerulosclerosis.  Comment: In light of the clinical history, the microangiopathic changes are likely secondary to anti-VEGF inhibitor use.  Other causes of chronic thrombotic microangiopathy are also on the differential.  There are no acute thrombi.  Light microscopy notable for approximately 50% interstitial fibrosis and tubular atrophy as well as evidence of acute tubular injury      Assessment/Plan  53 y.o. 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     1.  AXEL on CKD  3B versus progression of CKD.  CKD due to biopsy-proven diabetic nephropathy and chronic thrombotic microangiopathy (TMA).  Patient also had significant interstitial fibrosis and tubular atrophy at the time of biopsy in March 2022.  Patient is nonoliguric, but BUN and creatinine remain elevated.  There is no emergent need for dialysis, but he is approaching this need.  If patient needs dialysis emergently, we will plan on hemodialysis.  Long-term, patient previously expressed interest in transplant and peritoneal dialysis.  Avoid NSAIDs and other nephrotoxins.  Check renal function panel daily.     2.  Hyponatremia, persistent.  Restrict oral fluids to 1 L daily.  Do not order salt tabs.  Check sodium level daily.     3.  Metabolic acidosis, persistent, due to advanced chronic kidney disease.  Continue sodium bicarbonate infusion of 150 mEq in 1 L of D5W at a rate of 125 mL/h.  Check renal function panel daily.     4.  Azotemia, BUN remains elevated, but uremic symptoms slightly improved today.  No emergent need for dialysis.  Check renal function panel daily.     5.  Hypoalbuminemia, persistent, due to diabetic nephropathy.  I encouraged a vegetarian diet with vegetarian protein intake is much as possible.  Check renal function panel daily.     6.  Left diabetic foot infection, agree with orthopedic surgery consultation.  Patient might need amputation.     7.  Anemia of chronic disease, worsening.  Patient received packed red blood cell transfusion 1/27/2024.  Recommend subcutaneous Epogen 10,000 units Once today 1/27/2024.  Check CBC daily.    Discussed with Dr. Alejandro Mcbride.    Varun Ortiz MD  Nephrology  [facility]

## 2024-01-27 NOTE — PROGRESS NOTES
Dr. Mcbride at bedside with Wil RN to perform central line placement.    All necessary life support equipment in the room.     Time Out 1235. All in agreement.     Pt identified with 2 patient identifiers.    Procedure start time: 1240  Procedure end time: 1255     Medications given per MD order, please see MAR.     1 mg Versed 1236  1 mg Versed 1242

## 2024-01-28 ENCOUNTER — ANESTHESIA EVENT (OUTPATIENT)
Dept: SURGERY | Facility: MEDICAL CENTER | Age: 54
DRG: 853 | End: 2024-01-28
Payer: MEDICARE

## 2024-01-28 ENCOUNTER — ANESTHESIA (OUTPATIENT)
Dept: SURGERY | Facility: MEDICAL CENTER | Age: 54
DRG: 853 | End: 2024-01-28
Payer: MEDICARE

## 2024-01-28 LAB
ALBUMIN SERPL BCP-MCNC: 1.7 G/DL (ref 3.2–4.9)
ALBUMIN/GLOB SERPL: 0.5 G/DL
ALP SERPL-CCNC: 67 U/L (ref 30–99)
ALT SERPL-CCNC: 10 U/L (ref 2–50)
ANION GAP SERPL CALC-SCNC: 12 MMOL/L (ref 7–16)
AST SERPL-CCNC: 20 U/L (ref 12–45)
BASOPHILS # BLD AUTO: 0.2 % (ref 0–1.8)
BASOPHILS # BLD: 0.03 K/UL (ref 0–0.12)
BILIRUB SERPL-MCNC: 0.5 MG/DL (ref 0.1–1.5)
BUN SERPL-MCNC: 68 MG/DL (ref 8–22)
CALCIUM ALBUM COR SERPL-MCNC: 8.6 MG/DL (ref 8.5–10.5)
CALCIUM SERPL-MCNC: 6.8 MG/DL (ref 8.5–10.5)
CHLORIDE SERPL-SCNC: 97 MMOL/L (ref 96–112)
CO2 SERPL-SCNC: 19 MMOL/L (ref 20–33)
CREAT SERPL-MCNC: 6.06 MG/DL (ref 0.5–1.4)
EOSINOPHIL # BLD AUTO: 0.11 K/UL (ref 0–0.51)
EOSINOPHIL NFR BLD: 0.6 % (ref 0–6.9)
ERYTHROCYTE [DISTWIDTH] IN BLOOD BY AUTOMATED COUNT: 43.3 FL (ref 35.9–50)
GFR SERPLBLD CREATININE-BSD FMLA CKD-EPI: 10 ML/MIN/1.73 M 2
GLOBULIN SER CALC-MCNC: 3.1 G/DL (ref 1.9–3.5)
GLUCOSE BLD STRIP.AUTO-MCNC: 160 MG/DL (ref 65–99)
GLUCOSE BLD STRIP.AUTO-MCNC: 168 MG/DL (ref 65–99)
GLUCOSE BLD STRIP.AUTO-MCNC: 233 MG/DL (ref 65–99)
GLUCOSE BLD STRIP.AUTO-MCNC: 234 MG/DL (ref 65–99)
GLUCOSE BLD STRIP.AUTO-MCNC: 261 MG/DL (ref 65–99)
GLUCOSE SERPL-MCNC: 254 MG/DL (ref 65–99)
HCT VFR BLD AUTO: 21.9 % (ref 42–52)
HGB BLD-MCNC: 7.3 G/DL (ref 14–18)
IMM GRANULOCYTES # BLD AUTO: 0.13 K/UL (ref 0–0.11)
IMM GRANULOCYTES NFR BLD AUTO: 0.8 % (ref 0–0.9)
LYMPHOCYTES # BLD AUTO: 1.43 K/UL (ref 1–4.8)
LYMPHOCYTES NFR BLD: 8.3 % (ref 22–41)
MAGNESIUM SERPL-MCNC: 1.9 MG/DL (ref 1.5–2.5)
MCH RBC QN AUTO: 28.2 PG (ref 27–33)
MCHC RBC AUTO-ENTMCNC: 33.3 G/DL (ref 32.3–36.5)
MCV RBC AUTO: 84.6 FL (ref 81.4–97.8)
MONOCYTES # BLD AUTO: 0.95 K/UL (ref 0–0.85)
MONOCYTES NFR BLD AUTO: 5.5 % (ref 0–13.4)
NEUTROPHILS # BLD AUTO: 14.58 K/UL (ref 1.82–7.42)
NEUTROPHILS NFR BLD: 84.6 % (ref 44–72)
NRBC # BLD AUTO: 0 K/UL
NRBC BLD-RTO: 0 /100 WBC (ref 0–0.2)
PHOSPHATE SERPL-MCNC: 3.9 MG/DL (ref 2.5–4.5)
PLATELET # BLD AUTO: 293 K/UL (ref 164–446)
PMV BLD AUTO: 9.6 FL (ref 9–12.9)
POTASSIUM SERPL-SCNC: 3.4 MMOL/L (ref 3.6–5.5)
PROT SERPL-MCNC: 4.8 G/DL (ref 6–8.2)
RBC # BLD AUTO: 2.59 M/UL (ref 4.7–6.1)
SODIUM SERPL-SCNC: 128 MMOL/L (ref 135–145)
WBC # BLD AUTO: 17.2 K/UL (ref 4.8–10.8)

## 2024-01-28 PROCEDURE — 84100 ASSAY OF PHOSPHORUS: CPT

## 2024-01-28 PROCEDURE — 700105 HCHG RX REV CODE 258: Performed by: INTERNAL MEDICINE

## 2024-01-28 PROCEDURE — 770022 HCHG ROOM/CARE - ICU (200)

## 2024-01-28 PROCEDURE — 80053 COMPREHEN METABOLIC PANEL: CPT

## 2024-01-28 PROCEDURE — 700102 HCHG RX REV CODE 250 W/ 637 OVERRIDE(OP): Performed by: INTERNAL MEDICINE

## 2024-01-28 PROCEDURE — 85025 COMPLETE CBC W/AUTO DIFF WBC: CPT

## 2024-01-28 PROCEDURE — 99232 SBSQ HOSP IP/OBS MODERATE 35: CPT | Performed by: INTERNAL MEDICINE

## 2024-01-28 PROCEDURE — 83735 ASSAY OF MAGNESIUM: CPT

## 2024-01-28 PROCEDURE — 700111 HCHG RX REV CODE 636 W/ 250 OVERRIDE (IP): Mod: JZ | Performed by: INTERNAL MEDICINE

## 2024-01-28 PROCEDURE — 700111 HCHG RX REV CODE 636 W/ 250 OVERRIDE (IP): Performed by: INTERNAL MEDICINE

## 2024-01-28 PROCEDURE — 99291 CRITICAL CARE FIRST HOUR: CPT | Performed by: INTERNAL MEDICINE

## 2024-01-28 PROCEDURE — A9270 NON-COVERED ITEM OR SERVICE: HCPCS | Performed by: INTERNAL MEDICINE

## 2024-01-28 PROCEDURE — 82962 GLUCOSE BLOOD TEST: CPT | Mod: 91

## 2024-01-28 RX ORDER — MAGNESIUM SULFATE HEPTAHYDRATE 40 MG/ML
2 INJECTION, SOLUTION INTRAVENOUS ONCE
Status: COMPLETED | OUTPATIENT
Start: 2024-01-28 | End: 2024-01-28

## 2024-01-28 RX ORDER — CALCIUM GLUCONATE 20 MG/ML
1 INJECTION, SOLUTION INTRAVENOUS ONCE
Status: COMPLETED | OUTPATIENT
Start: 2024-01-28 | End: 2024-01-28

## 2024-01-28 RX ORDER — SODIUM CHLORIDE 9 MG/ML
250 INJECTION, SOLUTION INTRAVENOUS
Status: DISCONTINUED | OUTPATIENT
Start: 2024-01-28 | End: 2024-02-08 | Stop reason: HOSPADM

## 2024-01-28 RX ORDER — DEXTROSE MONOHYDRATE 25 G/50ML
25 INJECTION, SOLUTION INTRAVENOUS
Status: DISCONTINUED | OUTPATIENT
Start: 2024-01-28 | End: 2024-02-08 | Stop reason: HOSPADM

## 2024-01-28 RX ORDER — POTASSIUM CHLORIDE 14.9 MG/ML
20 INJECTION INTRAVENOUS ONCE
Status: COMPLETED | OUTPATIENT
Start: 2024-01-28 | End: 2024-01-28

## 2024-01-28 RX ADMIN — CHOLESTYRAMINE 4 G: 4 POWDER, FOR SUSPENSION ORAL at 19:57

## 2024-01-28 RX ADMIN — SODIUM BICARBONATE: 84 INJECTION, SOLUTION INTRAVENOUS at 01:45

## 2024-01-28 RX ADMIN — OMEPRAZOLE 20 MG: 20 CAPSULE, DELAYED RELEASE ORAL at 05:48

## 2024-01-28 RX ADMIN — POTASSIUM CHLORIDE 20 MEQ: 14.9 INJECTION, SOLUTION INTRAVENOUS at 08:52

## 2024-01-28 RX ADMIN — INSULIN HUMAN 5 UNITS: 100 INJECTION, SOLUTION PARENTERAL at 07:57

## 2024-01-28 RX ADMIN — ATORVASTATIN CALCIUM 80 MG: 40 TABLET, FILM COATED ORAL at 17:36

## 2024-01-28 RX ADMIN — LINEZOLID 600 MG: 600 INJECTION, SOLUTION INTRAVENOUS at 17:40

## 2024-01-28 RX ADMIN — INSULIN HUMAN 3 UNITS: 100 INJECTION, SOLUTION PARENTERAL at 01:50

## 2024-01-28 RX ADMIN — SODIUM BICARBONATE 75 MEQ: 84 INJECTION, SOLUTION INTRAVENOUS at 12:15

## 2024-01-28 RX ADMIN — PIPERACILLIN AND TAZOBACTAM 4.5 G: 4; .5 INJECTION, POWDER, FOR SOLUTION INTRAVENOUS at 17:41

## 2024-01-28 RX ADMIN — SODIUM BICARBONATE 75 MEQ: 84 INJECTION, SOLUTION INTRAVENOUS at 22:05

## 2024-01-28 RX ADMIN — LINEZOLID 600 MG: 600 INJECTION, SOLUTION INTRAVENOUS at 05:50

## 2024-01-28 RX ADMIN — CALCIUM GLUCONATE 1 G: 20 INJECTION, SOLUTION INTRAVENOUS at 08:47

## 2024-01-28 RX ADMIN — MAGNESIUM SULFATE HEPTAHYDRATE 2 G: 2 INJECTION, SOLUTION INTRAVENOUS at 08:56

## 2024-01-28 RX ADMIN — ASPIRIN 81 MG: 81 TABLET, COATED ORAL at 05:48

## 2024-01-28 RX ADMIN — PIPERACILLIN AND TAZOBACTAM 4.5 G: 4; .5 INJECTION, POWDER, FOR SOLUTION INTRAVENOUS at 05:53

## 2024-01-28 RX ADMIN — ACETAMINOPHEN 650 MG: 325 TABLET, FILM COATED ORAL at 16:42

## 2024-01-28 ASSESSMENT — ENCOUNTER SYMPTOMS
SHORTNESS OF BREATH: 0
HEADACHES: 1
ABDOMINAL PAIN: 0
VOMITING: 1
NAUSEA: 1
FEVER: 0

## 2024-01-28 ASSESSMENT — FIBROSIS 4 INDEX: FIB4 SCORE: 1.23

## 2024-01-28 NOTE — PROGRESS NOTES
"Critical Care Progress Note    Date of admission  1/25/2024    Chief Complaint  53 y.o. male admitted 1/25/2024 with septic shock secondary to diabetic soft tissue infection left foot     Hospital Course  \"53 y.o. 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 The patient reports his lost consciousness 2 weeks ago (1/9/24) and hit his foot, he was admitted at that time but not imaging of his foot was obtained. Yesterday he states his foot was fine however when he looked at it today he noted his 5th toe was bleeding and he prodded it with a Q-tip which made it bleed more and had some purulent discharge. In the ED he was noted to be hypotensive and was given a sepsis bolus with some improvement. His WBC was found to be significantly elevated at 24.7 and a XR showed gas in the soft tissues. Ortho was consulted (Imelda) and a CT was requested. His metabolic panel shows acute on chronic kidney disease with a Cr of 6.36 (baseline ~3). The patient will be admitted to the ICU for septic shock.\"    1/25-admitted with sepsis, AXEL secondary to diabetic left toe infection/osteo-  1/26-received 3.1L crystalloid, poor urine output, starting bicarb drip, nephrology consultation, Ortho will proceed with amputation when clinically stabilized, wound care following  1/27 - improved, ? OR today  1/28 -     Interval Problem Update  Reviewed last 24 hour events:  NPO for OR today  A/o x 4  SR 70 - 80  No pressors  Room air  Inc UOP  L TLC  Bicarb gtt 125  Home PPI  Hgb  On Epo  Day 4 of 7, zosyn/zyvox  Glargine, high SSI  Replace K, mg, ca    OR today    Update: Patient taken to preop today but surgery canceled after Ortho review of CT with recommendation for MRI prior to surgical intervention    Unfortunately, he cannot have MRI with contrast unless we are able to dialyze immediately after.  He is " scheduled to undergo tunneled HD catheter Monday morning and start HD  Will cancel ordered contrast MRI tonight and reschedule for after hemodialysis available  Appreciate Ortho surgery input  Patient updated as to plan        Review of Systems  Review of Systems   Unable to perform ROS: Acuity of condition        Vital Signs for last 24 hours   Temp:  [37.9 °C (100.2 °F)] 37.9 °C (100.2 °F)  Pulse:  [77-93] 93  Resp:  [15-34] 23  BP: (109-137)/(55-64) 137/64  SpO2:  [92 %-98 %] 92 %    Hemodynamic parameters for last 24 hours       Respiratory Information for the last 24 hours       Physical Exam   Physical Exam  Vitals and nursing note reviewed.   Constitutional:       General: He is not in acute distress.     Appearance: Normal appearance. He is well-developed and normal weight. He is ill-appearing.   HENT:      Head: Normocephalic and atraumatic.      Right Ear: External ear normal.      Left Ear: External ear normal.      Nose: Nose normal.      Mouth/Throat:      Mouth: Mucous membranes are dry.   Eyes:      Extraocular Movements: Extraocular movements intact.      Conjunctiva/sclera: Conjunctivae normal.   Neck:      Vascular: No JVD.      Trachea: No tracheal deviation.   Cardiovascular:      Rate and Rhythm: Normal rate and regular rhythm.      Pulses: Normal pulses.   Pulmonary:      Effort: Pulmonary effort is normal.      Breath sounds: Normal breath sounds. No rales.   Abdominal:      General: Bowel sounds are normal. There is no distension.      Palpations: Abdomen is soft.   Musculoskeletal:         General: No tenderness.      Cervical back: Neck supple.      Right lower leg: No edema.      Left lower leg: No edema.      Comments: Right transmetatarsal amputation, well healed   Skin:     General: Skin is warm and dry.      Capillary Refill: Capillary refill takes less than 2 seconds.      Findings: Erythema present. No rash.      Comments: Hemorrhagic bulla of the plantar surface of the left foot,  sanguinous discharge on the medial left 5th toe.   Neurological:      General: No focal deficit present.      Mental Status: He is alert and oriented to person, place, and time.      Cranial Nerves: No cranial nerve deficit.      Sensory: Sensory deficit (chronic) present.      Motor: No weakness.   Psychiatric:         Mood and Affect: Mood normal.         Behavior: Behavior normal.         Medications  Current Facility-Administered Medications   Medication Dose Route Frequency Provider Last Rate Last Admin    sodium bicarbonate 150 mEq in D5W infusion (premix)   Intravenous Continuous Varun Ortiz M.D. 125 mL/hr at 01/28/24 0145 New Bag at 01/28/24 0145    insulin regular (HumuLIN R,NovoLIN R) injection  2-9 Units Subcutaneous Q6HRS Alejandro Mcbride M.D.   5 Units at 01/28/24 0757    And    dextrose 50% (D50W) injection 25 g  25 g Intravenous Q15 MIN PRN lAejandro Mcbride M.D.        epoetin (Retacrit) injection (Non Dialysis Use) 10,000 Units  10,000 Units Subcutaneous Q7 DAYS Varun Ortiz M.D.   10,000 Units at 01/27/24 1321    LORazepam (Ativan) injection 1 mg  1 mg Intravenous Q6HRS PRN Alejandro Mcbride M.D.   1 mg at 01/27/24 1556    Linezolid (Zyvox) premix 600 mg  600 mg Intravenous Q12HRS Tino Romano Jr., D.O. 300 mL/hr at 01/28/24 0550 600 mg at 01/28/24 0550    atorvastatin (Lipitor) tablet 80 mg  80 mg Oral Q EVENING Tino Romano Jr., D.O.   80 mg at 01/27/24 1803    omeprazole (PriLOSEC) capsule 20 mg  20 mg Oral QAM Tino Romano Jr., D.O.   20 mg at 01/28/24 0548    senna-docusate (Pericolace Or Senokot S) 8.6-50 MG per tablet 2 Tablet  2 Tablet Oral BID Tino Romano Jr., D.O.        And    polyethylene glycol/lytes (Miralax) Packet 1 Packet  1 Packet Oral QDAY PRN Tino Romano Jr., JD.[email or web address]        And    bisacodyl (Dulcolax) suppository 10 mg  10 mg Rectal QDAY PRN Tino M Juan Antonio Jr., D.O.        ondansetron (Zofran) syringe/vial injection 4 mg  4 mg Intravenous Q4HRS PRN Tino Romano Jr.,  D.O.        ondansetron (Zofran ODT) dispertab 4 mg  4 mg Oral Q4HRS PRN JD Doss Jr..O.        promethazine (Phenergan) tablet 12.5-25 mg  12.5-25 mg Oral Q4HRS PRN JD Doss Jr..O.        promethazine (Phenergan) suppository 12.5-25 mg  12.5-25 mg Rectal Q4HRS PRN JD Doss Jr..O.        prochlorperazine (Compazine) injection 5-10 mg  5-10 mg Intravenous Q4HRS PRN JD Doss Jr..O.        acetaminophen (Tylenol) tablet 650 mg  650 mg Oral Q6HRS PRN JD Doss Jr..O.        LR (Bolus) infusion 500 mL  500 mL Intravenous Once PRN JD Doss Jr..O.        piperacillin-tazobactam (Zosyn) 4.5 g in  mL IVPB  4.5 g Intravenous Q12HRS JD Doss Jr..O. 25 mL/hr at 01/28/24 0553 4.5 g at 01/28/24 0553    aspirin EC tablet 81 mg  81 mg Oral DAILY JD Doss Jr..O.   81 mg at 01/28/24 0548    cholestyramine (Questran) 4 GM powder 4 g  1 Packet Oral BID Tino Romano Jr. D.O.   4 g at 01/27/24 2007       Fluids    Intake/Output Summary (Last 24 hours) at 1/28/2024 0814  Last data filed at 1/28/2024 0600  Gross per 24 hour   Intake 3124.72 ml   Output 1400 ml   Net 1724.72 ml         Laboratory      Recent Labs     01/26/24  0834   CPKTOTAL 237*       Recent Labs     01/26/24  0640 01/26/24  1727 01/27/24  0540 01/27/24  1300 01/28/24  0545   SODIUM 131*   < > 131* 129* 128*   POTASSIUM 4.2   < > 3.8 3.7 3.4*   CHLORIDE 107   < > 104 101 97   CO2 10*   < > 14* 15* 19*   BUN 80*   < > 77* 72* 68*   CREATININE 6.42*   < > 6.22* 5.85* 6.06*   MAGNESIUM 1.5  --  1.8  --  1.9   PHOSPHORUS 4.8*  --  3.9  --  3.9   CALCIUM 6.8*   < > 6.6* 7.0* 6.8*    < > = values in this interval not displayed.       Recent Labs     01/26/24  0640 01/26/24  1727 01/27/24  0540 01/27/24  1300 01/28/24  0545   ALTSGPT 10  --  9  --  10   ASTSGOT 21  --  20  --  20   ALKPHOSPHAT 61  --  56  --  67   TBILIRUBIN 0.4  --  0.4  --  0.5   GLUCOSE 100*   < > 195* 231* 254*    < > =  values in this interval not displayed.       Recent Labs     01/26/24  0640 01/27/24  0540 01/28/24  0545   WBC 19.3* 20.8* 17.2*   NEUTSPOLYS 87.60* 85.10* 84.60*   LYMPHOCYTES 5.00* 7.20* 8.30*   MONOCYTES 5.20 5.30 5.50   EOSINOPHILS 0.20 0.10 0.60   BASOPHILS 0.30 0.10 0.20   ASTSGOT 21 20 20   ALTSGPT 10 9 10   ALKPHOSPHAT 61 56 67   TBILIRUBIN 0.4 0.4 0.5       Recent Labs     01/25/24  1524 01/26/24  0640 01/27/24  0540 01/28/24  0545   RBC 2.94* 2.70* 2.36* 2.59*   HEMOGLOBIN 8.5* 7.7* 7.0* 7.3*   HEMATOCRIT 25.7* 25.3* 20.6* 21.9*   PLATELETCT 320 305 298 293   PROTHROMBTM 15.4*  --  17.3*  --    INR 1.20*  --  1.40*  --          Imaging  X-Ray:  I have personally reviewed the images and compared with prior images.    Assessment/Plan  * Septic shock (HCC)- (present on admission)  Assessment & Plan  Secondary to diabetic toe/foot infection/osteomyelitis  Continue broad-spectrum antibiotics  Ongoing resuscitation with crystalloid and bicarbonate  Vasopressor as needed  Follow-up lactate    Soft tissue infection of foot- (present on admission)  Assessment & Plan  Left fifth toe/foot wound infection with osteomyelitis and pathologic fracture  Continue broad-spectrum polymicrobial coverage with zosyn and zyvox  CT reviewed  Discussed with wound care team and orthopedics team, plan for amputation after further imaging  N.p.o. after midnight tonight for vascular catheter and possible orthopedic surgery    Type 2 diabetes mellitus with kidney complication, with long-term current use of insulin ([facility])- (present on admission)  Assessment & Plan  Goal blood glucose 140-180  Hypoglycemic currently, hold glargine, continue sliding scale insulin, accuchecks  hypoglycemia protocol  A1c 7.2 two weeks ago    Acute renal failure superimposed on stage 3b chronic kidney disease (HCC)- (present on admission)  Assessment & Plan  Likely ATN due to sepsis overlying diabetic nephropathy  Follows with [facility]  Aggressive crystalloid resuscitation  Renal dose meds, avoid nephrotoxins  Strict I/Os, Place Farrell catheter  For urine output, ongoing metabolic acidosis  Initiate sodium bicarb and infusion  Follow electrolytes closely  May require RRT, nephrology kindly consulting    Normocytic anemia- (present on admission)  Assessment & Plan  Chronic, follow  Restrictive transfusion strategy         I have performed a physical exam and reviewed and updated ROS and Plan today (1/28/2024). In review of yesterday's note (1/27/2024), there are no changes except as documented above.     Discussed patient condition and risk of morbidity and/or mortality with RN, RT, Pharmacy, QA team, and Ortho, Nephro  The patient remains critically ill.  Critical care time = 35 minutes in directly providing and coordinating critical care and extensive data review.  No time overlap and excludes procedures.

## 2024-01-28 NOTE — PROGRESS NOTES
Nephrology Daily Progress Note    Date of Service  1/28/2024    Chief Complaint  53 y.o. 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    Interval Problem Update  1/27 -urine output 1 L in the last 24 hours.  Patient denies chest pain, shortness of breath, headache, nausea, vomiting.  He does have diminished appetite.  1/28 -urine output 1.5 L in the last 24 hours.  Patient with ongoing uremic symptoms-nausea, vomiting, diminished appetite, bad taste in his mouth.  Denies chest pain, shortness of breath.  I discussed with the patient the need to start dialysis tomorrow. I discussed the risks, benefits, and alternatives to dialysis, and he agrees to do dialysis. He had a chance to ask questions about dialysis and I answered his questions.      Review of Systems  Review of Systems   Constitutional:  Negative for fever.   Respiratory:  Negative for shortness of breath.    Cardiovascular:  Negative for chest pain.   Gastrointestinal:  Positive for nausea and vomiting. Negative for abdominal pain.   Neurological:  Positive for headaches.   All other systems reviewed and are negative.       Physical Exam  Temp:  [36.8 °C (98.3 °F)] 36.8 °C (98.3 °F)  Pulse:  [77-95] 93  Resp:  [15-34] 21  BP: (109-150)/(55-70) 150/70  SpO2:  [92 %-98 %] 92 %    Physical Exam  Constitutional:       General: He is not in acute distress.     Appearance: He is well-developed. He is not diaphoretic.   HENT:      Head: Normocephalic and atraumatic.      Mouth/Throat:      Mouth: Mucous membranes are moist.      Pharynx: Oropharynx is clear. No oropharyngeal exudate.   Eyes:      General: No scleral icterus.     Extraocular Movements: Extraocular movements intact.   Neck:      Trachea: No tracheal deviation.   Cardiovascular:      Rate and Rhythm: Normal rate.      Heart sounds: Normal heart sounds.  No murmur heard.  Pulmonary:      Effort: Pulmonary effort is normal.      Breath sounds: Normal breath sounds. No stridor. No rales.   Abdominal:      General: There is no distension.      Palpations: Abdomen is soft.      Tenderness: There is no abdominal tenderness.   Musculoskeletal:         General: Normal range of motion.      Right lower leg: Edema (trace) present.      Left lower leg: Edema (trace) present.   Skin:     General: Skin is warm and dry.      Findings: Erythema (Left foot) present.   Neurological:      General: No focal deficit present.      Mental Status: He is alert and oriented to person, place, and time.   Psychiatric:         Mood and Affect: Mood normal.         Behavior: Behavior normal.         Fluids    Intake/Output Summary (Last 24 hours) at 1/28/2024 1317  Last data filed at 1/28/2024 0600  Gross per 24 hour   Intake 2196.78 ml   Output 1200 ml   Net 996.78 ml         Laboratory  Labs reviewed, pertinent labs below.  Recent Labs     01/26/24  0640 01/27/24  0540 01/28/24  0545   WBC 19.3* 20.8* 17.2*   RBC 2.70* 2.36* 2.59*   HEMOGLOBIN 7.7* 7.0* 7.3*   HEMATOCRIT 25.3* 20.6* 21.9*   MCV 93.7 87.3 84.6   MCH 28.5 29.7 28.2   MCHC 30.4* 34.0 33.3   RDW 48.8 45.3 43.3   PLATELETCT 305 298 293   MPV 9.7 9.8 9.6       Recent Labs     01/27/24  0540 01/27/24  1300 01/28/24  0545   SODIUM 131* 129* 128*   POTASSIUM 3.8 3.7 3.4*   CHLORIDE 104 101 97   CO2 14* 15* 19*   GLUCOSE 195* 231* 254*   BUN 77* 72* 68*   CREATININE 6.22* 5.85* 6.06*   CALCIUM 6.6* 7.0* 6.8*       Recent Labs     01/25/24  1524 01/27/24  0540   INR 1.20* 1.40*             URINALYSIS:  Lab Results   Component Value Date/Time    COLORURINE Yellow 01/09/2024 2142    CLARITY Clear 01/09/2024 2142    SPECGRAVITY 1.013 01/09/2024 2142    PHURINE 5.0 01/09/2024 2142    KETONES Negative 01/09/2024 2142    PROTEINURIN 300 (A) 01/09/2024 2142    BILIRUBINUR Negative 01/09/2024 2142    UROBILU 0.2 01/09/2024 2142    NITRITE  Negative 01/09/2024 2142    LEUKESTERAS Negative 01/09/2024 2142    OCCULTBLOOD Small (A) 01/09/2024 2142     [facility]  Lab Results   Component Value Date/Time    TOTPROTUR 258.0 (H) 05/03/2023 1210      Lab Results   Component Value Date/Time    CREATININEU 27.57 05/03/2023 1210         Imaging interpreted by radiologist. Imaging reports reviewed with pertinent findings below  DX-CHEST-PORTABLE (1 VIEW)   Final Result      CT-FOOT W/O PLUS RECONS LEFT   Final Result      1.  Findings are consistent with osteomyelitis and pathologic fracture involving the proximal phalanx of the fifth digit.      2.  Soft tissue swelling and emphysema is present around the fifth digit and in the plantar soft tissues of the foot. These findings are consistent with cellulitis.      DX-FOOT-COMPLETE 3+ LEFT   Final Result      1.  Comminuted fracture proximal phalanx fifth digit is identified which could be pathologic and related to osteomyelitis.      2.  Soft tissue swelling and emphysema in the fifth digit which could be due to cellulitis.      DX-CHEST-PORTABLE (1 VIEW)   Final Result         No acute cardiac or pulmonary abnormality is identified.      IR-CONSULT AND TREAT    (Results Pending)         Current Facility-Administered Medications   Medication Dose Route Frequency Provider Last Rate Last Admin    insulin regular (HumuLIN R,NovoLIN R) injection  3-14 Units Subcutaneous 4X/DAY ACHS Alejandro Mcbride M.D.   4 Units at 01/28/24 1123    And    dextrose 50% (D50W) injection 25 g  25 g Intravenous Q15 MIN PRN Alejandro Mcbride M.D.        sodium bicarbonate 75 mEq in 1/2 NS 1,000 mL infusion  75 mEq Intravenous Continuous Varun Ortiz M.D. 100 mL/hr at 01/28/24 1215 75 mEq at 01/28/24 1215    NS (Bolus) 0.9 % infusion 250 mL  250 mL Intravenous DIALYSIS PRN Varun Ortiz M.D.        epoetin (Retacrit) injection (Non Dialysis Use) 10,000 Units  10,000 Units Subcutaneous Q7 DAYS Varun Ortiz M.D.   10,000 Units at 01/27/24 1321    LORazepam  (Ativan) injection 1 mg  1 mg Intravenous Q6HRS PRN Alejandro Mcbride M.D.   1 mg at 01/27/24 1556    Linezolid (Zyvox) premix 600 mg  600 mg Intravenous Q12HRS JD Doss Jr..O.   Infusion Ended Prior to Shift at 01/28/24 0650    atorvastatin (Lipitor) tablet 80 mg  80 mg Oral Q EVENING Tino Romano Jr. D.O.   80 mg at 01/27/24 1803    omeprazole (PriLOSEC) capsule 20 mg  20 mg Oral QAM Tino Romano Jr. D.O.   20 mg at 01/28/24 0548    senna-docusate (Pericolace Or Senokot S) 8.6-50 MG per tablet 2 Tablet  2 Tablet Oral BID Tino Romano Jr., D.OAsiya        And    polyethylene glycol/lytes (Miralax) Packet 1 Packet  1 Packet Oral QDAY PRN Tino Romano Jr., JD.OAsiya        And    bisacodyl (Dulcolax) suppository 10 mg  10 mg Rectal QDAY PRN Tino Romano Jr., D.O.        ondansetron (Zofran) syringe/vial injection 4 mg  4 mg Intravenous Q4HRS PRN JD Doss Jr..O.        ondansetron (Zofran ODT) dispertab 4 mg  4 mg Oral Q4HRS PRN JD Doss Jr..O.        promethazine (Phenergan) tablet 12.5-25 mg  12.5-25 mg Oral Q4HRS PRN JD Doss Jr..O.        promethazine (Phenergan) suppository 12.5-25 mg  12.5-25 mg Rectal Q4HRS PRN JD Doss Jr..O.        prochlorperazine (Compazine) injection 5-10 mg  5-10 mg Intravenous Q4HRS PRN JD Doss Jr..O.        acetaminophen (Tylenol) tablet 650 mg  650 mg Oral Q6HRS PRN JD Doss Jr..O.        LR (Bolus) infusion 500 mL  500 mL Intravenous Once PRN Tino Romano Jr. D.O.        piperacillin-tazobactam (Zosyn) 4.5 g in  mL IVPB  4.5 g Intravenous Q12HRS Tino Romano Jr., D.O.   Stopped at 01/28/24 0953    aspirin EC tablet 81 mg  81 mg Oral DAILY Tino Romano Jr., D.O.   81 mg at 01/28/24 0548    cholestyramine (Questran) 4 GM powder 4 g  1 Packet Oral BID Tino Romano Jr., D.O.   4 g at 01/27/24 2007       Kidney biopsy 3/29/2022 interpreted by Dr. Ac  Diagnosis: Chronic glomerular  microangiopathic changes.  Nodular diabetic glomerulosclerosis.  Comment: In light of the clinical history, the microangiopathic changes are likely secondary to anti-VEGF inhibitor use.  Other causes of chronic thrombotic microangiopathy are also on the differential.  There are no acute thrombi.  Light microscopy notable for approximately 50% interstitial fibrosis and tubular atrophy as well as evidence of acute tubular injury      Assessment/Plan  53 y.o. 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     1.  Progression of CKD to ESRD.  CKD due to biopsy-proven diabetic nephropathy and chronic thrombotic microangiopathy (TMA).  Patient also had significant interstitial fibrosis and tubular atrophy at the time of biopsy in March 2022.  Patient is nonoliguric, but BUN and creatinine remain elevated, and patient has ongoing uremic symptoms.  I recommend placement of tunneled dialysis catheter followed by 3 days of dialysis initiation.  Long-term, he has expressed interest in transplant and peritoneal dialysis, but unable to do these things emergently.  Avoid NSAIDs and other nephrotoxins as he does still urinate.  Check renal function panel daily.     2.  Hyponatremia, persistent.  Restrict oral fluids to 1 L daily.  Do not order salt tabs.  Check sodium level daily.     3.  Metabolic acidosis, persistent but improving, due to advanced chronic disease.  Change sodium bicarbonate infusion from 150 mEq to 75 mEq and half-normal saline at a rate of 100 mL/h.  Check renal function panel daily.    4.  Azotemia, with high BUN and uremic symptoms.  Recommend dialysis initiation as above.  Check renal function panel daily.     5.  Hypoalbuminemia, persistent, due to diabetic nephropathy.  I recommend a vegetarian diet is much as possible.  Check renal function panel daily.     6.   Left diabetic foot infection, patient also noted to have gas in the soft tissues on CT scan.  If MRI is done with gadolinium contrast, patient will need hemodialysis within 24 hours to help mitigate the risk of nephrogenic systemic fibrosis.  Recommend checking with radiology if MRI without contrast can give sufficient information.     7.  Anemia of chronic disease, persistent.  As patient will be starting dialysis, recommend switch subcutaneous Epogen to IV Epogen thrice weekly with dialysis Monday Wednesday Friday.  Check CBC daily.    Discussed with Dr. Alejandro Mcbride.    Varun Ortiz MD  Nephrology  [facility]

## 2024-01-28 NOTE — CARE PLAN
Problem: Pain - Standard  Goal: Alleviation of pain or a reduction in pain to the patient’s comfort goal  Note: Pain assessed q2 hours   The patient is Watcher - Medium risk of patient condition declining or worsening    Shift Goals  Clinical Goals: surgery, hemodymaic stability  Patient Goals: comfort, slepo  Family Goals: updates

## 2024-01-28 NOTE — PROGRESS NOTES
I was asked by my colleague to evaluate this patient and potentially take him to the operating room for amputation of his left fifth toe secondary to necrosis.  On evaluation of his CT scan there is gas throughout his midfoot plantarly and in the area of his fifth toe.  On clinical evaluation his foot is swollen and erythematous throughout.  Recommend MRI left lower extremity with and without contrast prior to any operative intervention.  N.p.o. at midnight.

## 2024-01-28 NOTE — PROGRESS NOTES
Dr Murillo at bedside, sx canceled in pre op per MD. Pt needs MRI prior to sx  JULIANO De La Torre notified pt will return to unit

## 2024-01-29 ENCOUNTER — APPOINTMENT (OUTPATIENT)
Dept: RADIOLOGY | Facility: MEDICAL CENTER | Age: 54
DRG: 853 | End: 2024-01-29
Attending: INTERNAL MEDICINE
Payer: MEDICARE

## 2024-01-29 ENCOUNTER — APPOINTMENT (OUTPATIENT)
Dept: RADIOLOGY | Facility: MEDICAL CENTER | Age: 54
DRG: 853 | End: 2024-01-29
Attending: HOSPITALIST
Payer: MEDICARE

## 2024-01-29 ENCOUNTER — APPOINTMENT (OUTPATIENT)
Dept: RADIOLOGY | Facility: MEDICAL CENTER | Age: 54
DRG: 853 | End: 2024-01-29
Attending: EMERGENCY MEDICAL TECHNICIAN, INTERMEDIATE
Payer: MEDICARE

## 2024-01-29 ENCOUNTER — APPOINTMENT (OUTPATIENT)
Dept: RADIOLOGY | Facility: MEDICAL CENTER | Age: 54
DRG: 853 | End: 2024-01-29
Attending: STUDENT IN AN ORGANIZED HEALTH CARE EDUCATION/TRAINING PROGRAM
Payer: MEDICARE

## 2024-01-29 LAB
ALBUMIN SERPL BCP-MCNC: 1.3 G/DL (ref 3.2–4.9)
ALBUMIN/GLOB SERPL: ABNORMAL G/DL
ALP SERPL-CCNC: 69 U/L (ref 30–99)
ALT SERPL-CCNC: 10 U/L (ref 2–50)
ANION GAP SERPL CALC-SCNC: 13 MMOL/L (ref 7–16)
AST SERPL-CCNC: 21 U/L (ref 12–45)
BASOPHILS # BLD AUTO: 0.2 % (ref 0–1.8)
BASOPHILS # BLD: 0.04 K/UL (ref 0–0.12)
BILIRUB SERPL-MCNC: 0.7 MG/DL (ref 0.1–1.5)
BUN SERPL-MCNC: 57 MG/DL (ref 8–22)
CALCIUM ALBUM COR SERPL-MCNC: 8.7 MG/DL (ref 8.5–10.5)
CALCIUM SERPL-MCNC: 6.5 MG/DL (ref 8.5–10.5)
CHLORIDE SERPL-SCNC: 97 MMOL/L (ref 96–112)
CO2 SERPL-SCNC: 20 MMOL/L (ref 20–33)
CREAT SERPL-MCNC: 5.33 MG/DL (ref 0.5–1.4)
EOSINOPHIL # BLD AUTO: 0.13 K/UL (ref 0–0.51)
EOSINOPHIL NFR BLD: 0.7 % (ref 0–6.9)
ERYTHROCYTE [DISTWIDTH] IN BLOOD BY AUTOMATED COUNT: 43.8 FL (ref 35.9–50)
GFR SERPLBLD CREATININE-BSD FMLA CKD-EPI: 12 ML/MIN/1.73 M 2
GLOBULIN SER CALC-MCNC: ABNORMAL G/DL (ref 1.9–3.5)
GLUCOSE BLD STRIP.AUTO-MCNC: 203 MG/DL (ref 65–99)
GLUCOSE SERPL-MCNC: 139 MG/DL (ref 65–99)
HCT VFR BLD AUTO: 21.8 % (ref 42–52)
HGB BLD-MCNC: 7.6 G/DL (ref 14–18)
IMM GRANULOCYTES # BLD AUTO: 0.35 K/UL (ref 0–0.11)
IMM GRANULOCYTES NFR BLD AUTO: 1.8 % (ref 0–0.9)
LYMPHOCYTES # BLD AUTO: 1.53 K/UL (ref 1–4.8)
LYMPHOCYTES NFR BLD: 7.7 % (ref 22–41)
MAGNESIUM SERPL-MCNC: 1.9 MG/DL (ref 1.5–2.5)
MCH RBC QN AUTO: 29.6 PG (ref 27–33)
MCHC RBC AUTO-ENTMCNC: 34.9 G/DL (ref 32.3–36.5)
MCV RBC AUTO: 84.8 FL (ref 81.4–97.8)
MONOCYTES # BLD AUTO: 1.14 K/UL (ref 0–0.85)
MONOCYTES NFR BLD AUTO: 5.7 % (ref 0–13.4)
NEUTROPHILS # BLD AUTO: 16.73 K/UL (ref 1.82–7.42)
NEUTROPHILS NFR BLD: 83.9 % (ref 44–72)
NRBC # BLD AUTO: 0 K/UL
NRBC BLD-RTO: 0 /100 WBC (ref 0–0.2)
PHOSPHATE SERPL-MCNC: 3.9 MG/DL (ref 2.5–4.5)
PLATELET # BLD AUTO: 285 K/UL (ref 164–446)
PMV BLD AUTO: 9.3 FL (ref 9–12.9)
POTASSIUM SERPL-SCNC: 3.5 MMOL/L (ref 3.6–5.5)
PROT SERPL-MCNC: 4.9 G/DL (ref 6–8.2)
RBC # BLD AUTO: 2.57 M/UL (ref 4.7–6.1)
SODIUM SERPL-SCNC: 130 MMOL/L (ref 135–145)
WBC # BLD AUTO: 19.9 K/UL (ref 4.8–10.8)

## 2024-01-29 PROCEDURE — 73718 MRI LOWER EXTREMITY W/O DYE: CPT | Mod: LT

## 2024-01-29 PROCEDURE — 0JH63XZ INSERTION OF TUNNELED VASCULAR ACCESS DEVICE INTO CHEST SUBCUTANEOUS TISSUE AND FASCIA, PERCUTANEOUS APPROACH: ICD-10-PCS | Performed by: RADIOLOGY

## 2024-01-29 PROCEDURE — 700105 HCHG RX REV CODE 258: Performed by: RADIOLOGY

## 2024-01-29 PROCEDURE — 90935 HEMODIALYSIS ONE EVALUATION: CPT

## 2024-01-29 PROCEDURE — 700111 HCHG RX REV CODE 636 W/ 250 OVERRIDE (IP): Performed by: STUDENT IN AN ORGANIZED HEALTH CARE EDUCATION/TRAINING PROGRAM

## 2024-01-29 PROCEDURE — 700111 HCHG RX REV CODE 636 W/ 250 OVERRIDE (IP): Performed by: INTERNAL MEDICINE

## 2024-01-29 PROCEDURE — 700111 HCHG RX REV CODE 636 W/ 250 OVERRIDE (IP): Mod: JZ | Performed by: RADIOLOGY

## 2024-01-29 PROCEDURE — 02H633Z INSERTION OF INFUSION DEVICE INTO RIGHT ATRIUM, PERCUTANEOUS APPROACH: ICD-10-PCS | Performed by: RADIOLOGY

## 2024-01-29 PROCEDURE — 700111 HCHG RX REV CODE 636 W/ 250 OVERRIDE (IP): Mod: JZ

## 2024-01-29 PROCEDURE — 700105 HCHG RX REV CODE 258: Performed by: INTERNAL MEDICINE

## 2024-01-29 PROCEDURE — 5A1D70Z PERFORMANCE OF URINARY FILTRATION, INTERMITTENT, LESS THAN 6 HOURS PER DAY: ICD-10-PCS | Performed by: INTERNAL MEDICINE

## 2024-01-29 PROCEDURE — 82962 GLUCOSE BLOOD TEST: CPT

## 2024-01-29 PROCEDURE — 99152 MOD SED SAME PHYS/QHP 5/>YRS: CPT

## 2024-01-29 PROCEDURE — 84100 ASSAY OF PHOSPHORUS: CPT

## 2024-01-29 PROCEDURE — 700111 HCHG RX REV CODE 636 W/ 250 OVERRIDE (IP): Mod: JZ | Performed by: INTERNAL MEDICINE

## 2024-01-29 PROCEDURE — 85025 COMPLETE CBC W/AUTO DIFF WBC: CPT

## 2024-01-29 PROCEDURE — 700101 HCHG RX REV CODE 250

## 2024-01-29 PROCEDURE — 93922 UPR/L XTREMITY ART 2 LEVELS: CPT

## 2024-01-29 PROCEDURE — 700102 HCHG RX REV CODE 250 W/ 637 OVERRIDE(OP): Performed by: INTERNAL MEDICINE

## 2024-01-29 PROCEDURE — 99223 1ST HOSP IP/OBS HIGH 75: CPT | Performed by: HOSPITALIST

## 2024-01-29 PROCEDURE — 80053 COMPREHEN METABOLIC PANEL: CPT

## 2024-01-29 PROCEDURE — A9270 NON-COVERED ITEM OR SERVICE: HCPCS | Performed by: INTERNAL MEDICINE

## 2024-01-29 PROCEDURE — 770006 HCHG ROOM/CARE - MED/SURG/GYN SEMI*

## 2024-01-29 PROCEDURE — 99233 SBSQ HOSP IP/OBS HIGH 50: CPT | Performed by: STUDENT IN AN ORGANIZED HEALTH CARE EDUCATION/TRAINING PROGRAM

## 2024-01-29 PROCEDURE — 83735 ASSAY OF MAGNESIUM: CPT

## 2024-01-29 PROCEDURE — 93922 UPR/L XTREMITY ART 2 LEVELS: CPT | Mod: 26 | Performed by: INTERNAL MEDICINE

## 2024-01-29 RX ORDER — LIDOCAINE HYDROCHLORIDE 10 MG/ML
INJECTION, SOLUTION INFILTRATION; PERINEURAL
Status: COMPLETED
Start: 2024-01-29 | End: 2024-01-29

## 2024-01-29 RX ORDER — MIDAZOLAM HYDROCHLORIDE 1 MG/ML
.5-2 INJECTION INTRAMUSCULAR; INTRAVENOUS PRN
Status: ACTIVE | OUTPATIENT
Start: 2024-01-29 | End: 2024-01-29

## 2024-01-29 RX ORDER — HEPARIN SODIUM 1000 [USP'U]/ML
INJECTION, SOLUTION INTRAVENOUS; SUBCUTANEOUS
Status: COMPLETED
Start: 2024-01-29 | End: 2024-01-29

## 2024-01-29 RX ORDER — HEPARIN SODIUM 1000 [USP'U]/ML
3600 INJECTION, SOLUTION INTRAVENOUS; SUBCUTANEOUS
Status: DISCONTINUED | OUTPATIENT
Start: 2024-01-29 | End: 2024-02-08 | Stop reason: HOSPADM

## 2024-01-29 RX ORDER — HYDROMORPHONE HYDROCHLORIDE 1 MG/ML
0.5 INJECTION, SOLUTION INTRAMUSCULAR; INTRAVENOUS; SUBCUTANEOUS
Status: ACTIVE | OUTPATIENT
Start: 2024-01-29 | End: 2024-01-30

## 2024-01-29 RX ORDER — OXYCODONE HYDROCHLORIDE 5 MG/1
5 TABLET ORAL
Status: ACTIVE | OUTPATIENT
Start: 2024-01-29 | End: 2024-01-30

## 2024-01-29 RX ORDER — ONDANSETRON 2 MG/ML
4 INJECTION INTRAMUSCULAR; INTRAVENOUS PRN
Status: ACTIVE | OUTPATIENT
Start: 2024-01-29 | End: 2024-01-29

## 2024-01-29 RX ORDER — LIDOCAINE HYDROCHLORIDE AND EPINEPHRINE 10; 10 MG/ML; UG/ML
INJECTION, SOLUTION INFILTRATION; PERINEURAL
Status: COMPLETED
Start: 2024-01-29 | End: 2024-01-29

## 2024-01-29 RX ORDER — SODIUM CHLORIDE 9 MG/ML
500 INJECTION, SOLUTION INTRAVENOUS
Status: ACTIVE | OUTPATIENT
Start: 2024-01-29 | End: 2024-01-29

## 2024-01-29 RX ORDER — MIDAZOLAM HYDROCHLORIDE 1 MG/ML
INJECTION INTRAMUSCULAR; INTRAVENOUS
Status: COMPLETED
Start: 2024-01-29 | End: 2024-01-29

## 2024-01-29 RX ORDER — HEPARIN SODIUM 5000 [USP'U]/ML
5000 INJECTION, SOLUTION INTRAVENOUS; SUBCUTANEOUS EVERY 8 HOURS
Status: DISCONTINUED | OUTPATIENT
Start: 2024-01-29 | End: 2024-02-08 | Stop reason: HOSPADM

## 2024-01-29 RX ORDER — OXYCODONE HYDROCHLORIDE 10 MG/1
10 TABLET ORAL
Status: ACTIVE | OUTPATIENT
Start: 2024-01-29 | End: 2024-01-30

## 2024-01-29 RX ADMIN — ATORVASTATIN CALCIUM 80 MG: 40 TABLET, FILM COATED ORAL at 17:17

## 2024-01-29 RX ADMIN — PIPERACILLIN AND TAZOBACTAM 4.5 G: 4; .5 INJECTION, POWDER, FOR SOLUTION INTRAVENOUS at 21:36

## 2024-01-29 RX ADMIN — MIDAZOLAM HYDROCHLORIDE 2 MG: 1 INJECTION, SOLUTION INTRAMUSCULAR; INTRAVENOUS at 12:49

## 2024-01-29 RX ADMIN — FENTANYL CITRATE 50 MCG: 50 INJECTION, SOLUTION INTRAMUSCULAR; INTRAVENOUS at 12:51

## 2024-01-29 RX ADMIN — FENTANYL CITRATE 50 MCG: 50 INJECTION, SOLUTION INTRAMUSCULAR; INTRAVENOUS at 12:54

## 2024-01-29 RX ADMIN — ASPIRIN 81 MG: 81 TABLET, COATED ORAL at 05:20

## 2024-01-29 RX ADMIN — HEPARIN SODIUM 3600 UNITS: 1000 INJECTION, SOLUTION INTRAVENOUS; SUBCUTANEOUS at 15:37

## 2024-01-29 RX ADMIN — LINEZOLID 600 MG: 600 INJECTION, SOLUTION INTRAVENOUS at 05:18

## 2024-01-29 RX ADMIN — SODIUM BICARBONATE 75 MEQ: 84 INJECTION, SOLUTION INTRAVENOUS at 19:50

## 2024-01-29 RX ADMIN — LINEZOLID 600 MG: 600 INJECTION, SOLUTION INTRAVENOUS at 18:14

## 2024-01-29 RX ADMIN — SODIUM BICARBONATE 75 MEQ: 84 INJECTION, SOLUTION INTRAVENOUS at 08:53

## 2024-01-29 RX ADMIN — PIPERACILLIN AND TAZOBACTAM 4.5 G: 4; .5 INJECTION, POWDER, FOR SOLUTION INTRAVENOUS at 05:16

## 2024-01-29 RX ADMIN — MIDAZOLAM HYDROCHLORIDE 2 MG: 1 INJECTION INTRAMUSCULAR; INTRAVENOUS at 12:49

## 2024-01-29 RX ADMIN — CHOLESTYRAMINE 4 G: 4 POWDER, FOR SUSPENSION ORAL at 17:58

## 2024-01-29 RX ADMIN — OMEPRAZOLE 20 MG: 20 CAPSULE, DELAYED RELEASE ORAL at 05:20

## 2024-01-29 RX ADMIN — HEPARIN SODIUM 5000 UNITS: 5000 INJECTION, SOLUTION INTRAVENOUS; SUBCUTANEOUS at 21:36

## 2024-01-29 RX ADMIN — LIDOCAINE HYDROCHLORIDE AND EPINEPHRINE: 10; 10 INJECTION, SOLUTION INFILTRATION; PERINEURAL at 12:55

## 2024-01-29 RX ADMIN — CEFAZOLIN 2 G: 2 INJECTION, POWDER, FOR SOLUTION INTRAMUSCULAR; INTRAVENOUS at 17:25

## 2024-01-29 RX ADMIN — LIDOCAINE HYDROCHLORIDE: 10 INJECTION, SOLUTION INFILTRATION; PERINEURAL at 12:55

## 2024-01-29 RX ADMIN — EPOETIN ALFA-EPBX 5000 UNITS: 3000 INJECTION, SOLUTION INTRAVENOUS; SUBCUTANEOUS at 14:41

## 2024-01-29 RX ADMIN — HEPARIN SODIUM 5000 UNITS: 5000 INJECTION, SOLUTION INTRAVENOUS; SUBCUTANEOUS at 09:24

## 2024-01-29 RX ADMIN — HEPARIN SODIUM 5000 UNITS: 5000 INJECTION, SOLUTION INTRAVENOUS; SUBCUTANEOUS at 17:16

## 2024-01-29 ASSESSMENT — ENCOUNTER SYMPTOMS
FEVER: 0
HEADACHES: 0
PALPITATIONS: 0
MUSCULOSKELETAL NEGATIVE: 1
FOCAL WEAKNESS: 0
SHORTNESS OF BREATH: 0
EYES NEGATIVE: 1
CHILLS: 0
VOMITING: 0
SPUTUM PRODUCTION: 0
NAUSEA: 0
ABDOMINAL PAIN: 0
SORE THROAT: 0

## 2024-01-29 NOTE — CARE PLAN
Problem: Knowledge Deficit - Standard  Goal: Patient and family/care givers will demonstrate understanding of plan of care, disease process/condition, diagnostic tests and medications  Outcome: Progressing     Problem: Hemodynamics  Goal: Patient's hemodynamics, fluid balance and neurologic status will be stable or improve  Outcome: Progressing     Problem: Fluid Volume  Goal: Fluid volume balance will be maintained  Outcome: Progressing     Problem: Urinary - Renal Perfusion  Goal: Ability to achieve and maintain adequate renal perfusion and functioning will improve  Outcome: Progressing     Problem: Respiratory  Goal: Patient will achieve/maintain optimum respiratory ventilation and gas exchange  Outcome: Progressing     Problem: Mechanical Ventilation  Goal: Safe management of artificial airway and ventilation  Outcome: Progressing  Goal: Successful weaning off mechanical ventilator, spontaneously maintains adequate gas exchange  Outcome: Progressing  Goal: Patient will be able to express needs and understand communication  Outcome: Progressing     Problem: Physical Regulation  Goal: Diagnostic test results will improve  Outcome: Progressing  Goal: Signs and symptoms of infection will decrease  Outcome: Progressing     Problem: Pain - Standard  Goal: Alleviation of pain or a reduction in pain to the patient’s comfort goal  Outcome: Progressing     Problem: Skin Integrity  Goal: Skin integrity is maintained or improved  Outcome: Progressing     Problem: Fall Risk  Goal: Patient will remain free from falls  Outcome: Progressing   The patient is Watcher - Medium risk of patient condition declining or worsening    Shift Goals  Clinical Goals: imaging, HD  Patient Goals: surgery, comfort/sleep  Family Goals: no family present    Progress made toward(s) clinical / shift goals:  All of the above    Patient is not progressing towards the following goals:

## 2024-01-29 NOTE — CONSULTS
Hospital Medicine Consultation    Date of Service  1/29/2024    Referring Physician  Sam Givens M.D.    Consulting Physician  Diego Colón M.D.    Reason for Consultation  Sepsis     History of Presenting Illness  53 y.o. male who presented 1/25/2024 with infected diabetic foot wound.  Mr. Moore has a past medical history of chronic kidney disease baseline creatinine of 2.1 followed by nephrology, insulin-dependent diabetes mellitus, peripheral neuropathy with history of bilateral toe amputations that presented to the emergency room on 1/25/2024 with left fifth toe infection and bleeding.  Emergency room x-ray revealed pathologic fracture and was consistent with osteomyelitis with gas in the soft tissues.  His white blood cell count was 24.7 thousand.  His creatinine was quite elevated at 6.36.  He was admitted to the intensive care unit with a working diagnosis of septic shock and placed on IV Zosyn and Zyvox.  Orthopedics and nephrology was consulted.  Due to ongoing elevated creatinine, a tunneled dialysis catheter was placed.  He was initiated on dialysis on 1/29/2024.    Review of Systems  Review of Systems   Constitutional:  Negative for chills and fever.   Respiratory:  Negative for shortness of breath.    Cardiovascular:  Negative for chest pain.   All other systems reviewed and are negative.      Past Medical History   has a past medical history of Anesthesia, Anesthesia (12/06/2023), Bowel habit changes, Cataract, Cataract, Deaf, right, Diabetes (HCC), Heart burn, Hemorrhagic disorder (HCC), History of non-ST elevation myocardial infarction (NSTEMI), heart artery stent (2019), Hyperlipidemia, Hyperparathyroidism due to renal insufficiency (HCC), Hypertension, Myocardial infarct (HCC), Pneumonia, and Renal disorder (12/06/2023).    Surgical History   has a past surgical history that includes other orthopedic surgery; toe amputation (Right, 07/02/2018); achilles tendon repair (Right, 07/02/2018);  irrigation & debridement ortho (Right, 2018); vasectomy; tonsillectomy; other abdominal surgery; stent placement (2019); toe amputation (Left, 2021); other (Left); cataract extraction with iol (Right, ); pr open treatment prox humeral fracture (Left, 2023); and pr repair biceps long tendon (Left, 2023).    Family History  family history includes Cancer in his paternal grandfather and paternal grandmother; Diabetes in his father and maternal grandmother; Heart Disease in his father and maternal grandfather; Lung Cancer in his paternal grandfather; Lung Disease in his paternal grandmother; No Known Problems in his mother.    Social History   reports that he quit smoking about 22 years ago. His smoking use included cigarettes. He started smoking about 32 years ago. He has a 5.0 pack-year smoking history. He has never used smokeless tobacco. He reports that he does not currently use drugs after having used the following drugs: Marijuana and Oral. He reports that he does not drink alcohol.    Medications  Prior to Admission Medications   Prescriptions Last Dose Informant Patient Reported? Taking?   HUMULIN R 100 UNIT/ML Solution 2024 at [facility] Patient Yes No   Sig: Inject 4-8 Units under the skin 3 times a day before meals. Sliding Scale   150 - 200 = 3 units  201 - 250 = 4 units  251 - 300 = 5 units  301 - 350 = 6 units  351 - 400 = 7 units   PLUS CARB CORRECTION  of 1 unit for every 15 g carb   Insulin Degludec (TRESIBA FLEXTOUCH) 100 UNIT/ML Solution Pen-injector 2024 at PM Patient Yes No   Sig: Inject 36 Units under the skin every evening. 36 units at night   Ranibizumab (LUCENTIS) 0.3 MG/0.05ML Solution ~MONTH AGO at [facility] Patient Yes No   Si Dose by Intravitreal route see administration instructions. Every 6-8 weeks   aspirin EC (ECOTRIN) 81 MG Tablet Delayed Response 2024 at AM Patient No No   Sig: Take 1 Tablet by mouth every day.   atorvastatin (LIPITOR) 80 MG tablet  1/24/2024 at PM Patient No No   Sig: Take 1 Tablet by mouth every evening.   carvedilol (COREG) 3.125 MG Tab 1/25/2024 at AM Patient No No   Sig: Take 1 Tablet by mouth 2 times a day with meals.   cholestyramine (QUESTRAN) 4 g packet 1/24/2024 at 1700 Patient Yes No   Sig: Take 1 Packet by mouth 2 times a day. 1000 + 1700   enalapril (VASOTEC) 20 MG tablet 1/25/2024 at AM Patient No No   Sig: TAKE ONE-HALF (1/2) TABLET TWICE A DAY   furosemide (LASIX) 40 MG Tab 1/9/2024 at PRN Historical No No   Sig: Take 0.5 Tablets by mouth 1 time a day as needed (If noticeable swelling in extremities and abdomen or gained 2lb in 24hrs.).   omeprazole (PRILOSEC) 20 MG delayed-release capsule 1/25/2024 at AM Patient Yes No   Sig: Take 1 Capsule by mouth every morning.   oseltamivir (TAMIFLU) 30 MG Cap 1/16/2024 at COMPLETE Patient Yes Yes   Sig: Take 30 mg by mouth every day. X 3 days   sodium bicarbonate (SODIUM BICARBONATE) 650 MG Tab 1/25/2024 at AM Patient No No   Sig: Take 1 Tablet by mouth in the morning, at noon, and at bedtime.      Facility-Administered Medications: None       Allergies  No Known Allergies    Physical Exam  Pulse:  [73-93] 76  Resp:  [11-35] 16  BP: (106-153)/(59-80) 124/62  SpO2:  [90 %-95 %] 95 %    Physical Exam  Vitals and nursing note reviewed.   Constitutional:       Appearance: He is ill-appearing. He is not toxic-appearing.   Neck:      Comments: Right tunneled dialysis catheter  Left IJ central line  Cardiovascular:      Rate and Rhythm: Normal rate and regular rhythm.   Pulmonary:      Effort: Pulmonary effort is normal.      Breath sounds: Normal breath sounds.   Abdominal:      General: There is no distension.      Tenderness: There is no abdominal tenderness.   Musculoskeletal:      Comments: Left 5th toe infection with pus, scab, proximal cellulitis and swelling   Skin:     General: Skin is dry.   Neurological:      Mental Status: He is oriented to person, place, and time.   Psychiatric:          Mood and Affect: Mood normal.         Behavior: Behavior normal.         Thought Content: Thought content normal.         Judgment: Judgment normal.         Fluids  Date 01/29/24 0700 - 01/30/24 0659   Shift 4335-6962 4058-8362 2585-6016 24 Hour Total   INTAKE   P.O. 120   120   IV Piggyback 442.7   442.7   Shift Total 562.7   562.7   OUTPUT   Urine 260   260   Shift Total 260   260   Weight (kg) 99 99 99 99       Laboratory  Recent Labs     01/27/24  0540 01/28/24  0545 01/29/24  0454   WBC 20.8* 17.2* 19.9*   RBC 2.36* 2.59* 2.57*   HEMOGLOBIN 7.0* 7.3* 7.6*   HEMATOCRIT 20.6* 21.9* 21.8*   MCV 87.3 84.6 84.8   MCH 29.7 28.2 29.6   MCHC 34.0 33.3 34.9   RDW 45.3 43.3 43.8   PLATELETCT 298 293 285   MPV 9.8 9.6 9.3     Recent Labs     01/27/24  1300 01/28/24  0545 01/29/24  0454   SODIUM 129* 128* 130*   POTASSIUM 3.7 3.4* 3.5*   CHLORIDE 101 97 97   CO2 15* 19* 20   GLUCOSE 231* 254* 139*   BUN 72* 68* 57*   CREATININE 5.85* 6.06* 5.33*   CALCIUM 7.0* 6.8* 6.5*     Recent Labs     01/27/24  0540   INR 1.40*                 Imaging  IR-BRADLEY SABA PLACEMENT >5   Final Result      1. ULTRASOUND AND FLUOROSCOPIC GUIDED PLACEMENT OF A Right INTERNAL JUGULAR 14.5 Syriac GLIDE PATH TUNNELED DIALYSIS CATHETER.      2. THE HEMODIALYSIS CATHETER MAY BE USED IMMEDIATELY AS CLINICALLY INDICATED. FLUSHES PER PROTOCOL.         US-FLOWER SINGLE LEVEL BILAT   Final Result      DX-CHEST-PORTABLE (1 VIEW)   Final Result      CT-FOOT W/O PLUS RECONS LEFT   Final Result      1.  Findings are consistent with osteomyelitis and pathologic fracture involving the proximal phalanx of the fifth digit.      2.  Soft tissue swelling and emphysema is present around the fifth digit and in the plantar soft tissues of the foot. These findings are consistent with cellulitis.      DX-FOOT-COMPLETE 3+ LEFT   Final Result      1.  Comminuted fracture proximal phalanx fifth digit is identified which could be pathologic and related to  osteomyelitis.      2.  Soft tissue swelling and emphysema in the fifth digit which could be due to cellulitis.      DX-CHEST-PORTABLE (1 VIEW)   Final Result         No acute cardiac or pulmonary abnormality is identified.      MR-FOOT-W/O LEFT    (Results Pending)       Assessment/Plan  * Septic shock (HCC)- (present on admission)  Assessment & Plan  This is Septic shock Present on admission  SIRS criteria identified on my evaluation include: Leukocytosis, with WBC greater than 12,000  Clinical indicators of end organ dysfunction include Hypotension with systolic blood pressure less than 90 or MAP less than 65  Indicators of septic shock include: Sepsis present and persistent hypotension despite fluid resuscitation   Sources is: Foot infection  Sepsis protocol initiated  Crystalloid Fluid Administration: Was given  IV antibiotics as appropriate for source of sepsis IV Zosyn and Zyvox  Reassessment: I have reassessed the patient's hemodynamic status    Soft tissue infection of foot- (present on admission)  Assessment & Plan  Extensive soft tissue infection with osteomyelitis  MRI was to be done with contrast but this would need to be followed by dialysis which was not accessible at the time therefore MRI without contrast will be done.  N.p.o. at midnight  Dr. Segura will plan surgery on 1/30.    Osteomyelitis of left foot (HCC)- (present on admission)  Assessment & Plan  Noted on CT scan    Type 2 diabetes mellitus with kidney complication, with long-term current use of insulin (HCC)- (present on admission)  Assessment & Plan  Hemoglobin A1c is quite impressive at 7.2  Sign scale insulin for now and n.p.o. at midnight    Acute renal failure superimposed on stage 3b chronic kidney disease (HCC)- (present on admission)  Assessment & Plan  Nephrology was consulted and a tunneled dialysis catheter was placed and dialysis was initiated on 1/29/2024      Normocytic anemia- (present on admission)  Assessment &  Plan  Likely component of chronic disease as well as chronic kidney disease  Hemoglobin 7.6 and will be followed closely as he may need transfusion  Erythropoietin likely to be initiated now that he is on dialysis

## 2024-01-29 NOTE — PROGRESS NOTES
Pt presents to IR1. Patient was consented by MD at bedside, confirmed by this RN and consent at bedside. Pt transferred to IR table in Supine position. Patient underwent a TDC placement by Dr. Samayoa. Procedure site was marked by MD and verified using imaging guidance. Pt placed on monitor, prepped and draped in a sterile fashion. Vitals were taken every 5 minutes and remained stable during procedure (see doc flow sheet for results). CO2 waveform capnography was monitored and remained WNL throughout procedure. Report called to RN. Pt transported by bed with RN to [id number].

## 2024-01-29 NOTE — PROGRESS NOTES
Critical Care Progress Note    Date of admission  1/25/2024    Chief Complaint  53 y.o. male with a history of DM (neuropathy, retinopathy, nephropathy), CAD, HTN, CKD (followed by [facility]) who was admitted 1/25 with left foot infection.  In ED was hypotensive and required sepsis bolus.  Gas noted in soft tissue film and ortho was consulted.      Hospital Course  1/25-admitted with sepsis, AXEL secondary to diabetic left toe infection/osteo-  1/26-received 3.1L crystalloid, poor urine output, starting bicarb drip, nephrology consultation, Ortho will proceed with amputation when clinically stabilized, wound care following  1/27 - improved, ortho following  1/28 - Ortho determining when to take to the operating room    1/29 - Transfer to [facility].      Interval Problem Update  Reviewed last 24 hour events:  Tmax: Afebrile  Diet: Advance as tolerated  Vasopressors: None    Infusions:   Sodium bicarbonate in half-normal saline    Antibx:   Linezolid 1/25-present  Zosyn 1/25-present    Blood cultures negative x 2 from 1/25    Intake / Output  Urine Output past 24 hours: 720 mL  Net admission: 7.1 L    Lab Trends  WBC 17-20    -130  Creatinine 6-5.3    Review of Systems  Review of Systems   Constitutional:  Positive for malaise/fatigue. Negative for chills and fever.   HENT:  Negative for congestion and sore throat.    Eyes: Negative.    Respiratory:  Negative for sputum production and shortness of breath.    Cardiovascular:  Negative for chest pain and palpitations.   Gastrointestinal:  Negative for abdominal pain, nausea and vomiting.   Genitourinary: Negative.    Musculoskeletal: Negative.    Skin: Negative.    Neurological:  Negative for focal weakness and headaches.   All other systems reviewed and are negative.       Vital Signs for last 24 hours   Temp:  [36.8 °C (98.3 °F)] 36.8 °C (98.3 °F)  Pulse:  [73-93] 81  Resp:  [11-35] 22  BP: (106-153)/(59-80) 133/62  SpO2:  [90 %-95 %] 94 %    Hemodynamic  parameters for last 24 hours       Respiratory Information for the last 24 hours       Physical Exam   Physical Exam  Vitals and nursing note reviewed. Exam conducted with a chaperone present.   Constitutional:       General: He is not in acute distress.     Appearance: He is ill-appearing.   HENT:      Head: Normocephalic.      Mouth/Throat:      Mouth: Mucous membranes are moist.   Eyes:      Extraocular Movements: Extraocular movements intact.   Cardiovascular:      Rate and Rhythm: Normal rate and regular rhythm.      Pulses: Normal pulses.   Pulmonary:      Effort: Pulmonary effort is normal. No respiratory distress.   Abdominal:      General: There is no distension.      Palpations: Abdomen is soft.      Tenderness: There is no abdominal tenderness. There is no guarding or rebound.   Musculoskeletal:         General: Normal range of motion.      Cervical back: Normal range of motion and neck supple.      Left foot: Swelling and tenderness present.      Comments: Left foot with bullae   Skin:     General: Skin is warm and dry.      Capillary Refill: Capillary refill takes less than 2 seconds.   Neurological:      General: No focal deficit present.      Mental Status: He is alert and oriented to person, place, and time. Mental status is at baseline.         Medications  Current Facility-Administered Medications   Medication Dose Route Frequency Provider Last Rate Last Admin    heparin injection 5,000 Units  5,000 Units Subcutaneous Q8HRS Sam Givens M.D.   5,000 Units at 01/29/24 0924    insulin regular (HumuLIN R,NovoLIN R) injection  3-14 Units Subcutaneous 4X/DAY JOHN Mcbride M.D.   3 Units at 01/28/24 1958    And    dextrose 50% (D50W) injection 25 g  25 g Intravenous Q15 MIN PRN Alejandro Mcbride M.D.        sodium bicarbonate 75 mEq in 1/2 NS 1,000 mL infusion  75 mEq Intravenous Continuous Varun Ortiz M.D. 100 mL/hr at 01/29/24 0853 75 mEq at 01/29/24 0853    NS (Bolus) 0.9 % infusion 250 mL  250  mL Intravenous DIALYSIS PRN Varun Ortiz M.D.        epoetin (Retacrit) 5,000 Units injection (Dialysis Use Only)  5,000 Units Intravenous MO, WE + FR Varun Ortiz M.D.        LORazepam (Ativan) injection 1 mg  1 mg Intravenous Q6HRS PRN Alejandro Mcbride M.D.   1 mg at 01/27/24 1556    Linezolid (Zyvox) premix 600 mg  600 mg Intravenous Q12HRS Tino Romano Jr., D.O.   Stopped at 01/29/24 0618    atorvastatin (Lipitor) tablet 80 mg  80 mg Oral Q EVENING Tino Romano Jr., D.O.   80 mg at 01/28/24 1736    omeprazole (PriLOSEC) capsule 20 mg  20 mg Oral QAM Tino Romano Jr. D.O.   20 mg at 01/29/24 0520    senna-docusate (Pericolace Or Senokot S) 8.6-50 MG per tablet 2 Tablet  2 Tablet Oral BID Tino Romano Jr., D.O.        And    polyethylene glycol/lytes (Miralax) Packet 1 Packet  1 Packet Oral QDAY PRN Tino Romano Jr., D.O.        And    bisacodyl (Dulcolax) suppository 10 mg  10 mg Rectal QDAY PRN Tino Romano Jr., D.O.        ondansetron (Zofran) syringe/vial injection 4 mg  4 mg Intravenous Q4HRS PRN Tino Romano Jr., JD.O.        ondansetron (Zofran ODT) dispertab 4 mg  4 mg Oral Q4HRS PRN Tino Romano Jr., D.O.        promethazine (Phenergan) tablet 12.5-25 mg  12.5-25 mg Oral Q4HRS PRN Tino Romano Jr., JD.O.        promethazine (Phenergan) suppository 12.5-25 mg  12.5-25 mg Rectal Q4HRS PRN Tino Romano Jr., D.O.        prochlorperazine (Compazine) injection 5-10 mg  5-10 mg Intravenous Q4HRS PRN JD Doss Jr..O.        acetaminophen (Tylenol) tablet 650 mg  650 mg Oral Q6HRS PRN Tino Romano Jr., D.O.   650 mg at 01/28/24 1642    LR (Bolus) infusion 500 mL  500 mL Intravenous Once PRN Tino Romano Jr. D.O.        piperacillin-tazobactam (Zosyn) 4.5 g in  mL IVPB  4.5 g Intravenous Q12HRS Tino Romano Jr., D.O. 25 mL/hr at 01/29/24 0516 4.5 g at 01/29/24 0516    aspirin EC tablet 81 mg  81 mg Oral DAILY Tino Romano Jr., D.O.   81 mg at 01/29/24 0520     cholestyramine (Questran) 4 GM powder 4 g  1 Packet Oral BID Tino Romano Jr., D.O.   4 g at 01/28/24 1957       Fluids    Intake/Output Summary (Last 24 hours) at 1/29/2024 0926  Last data filed at 1/29/2024 0527  Gross per 24 hour   Intake 2596.18 ml   Output 360 ml   Net 2236.18 ml       Laboratory            Recent Labs     01/27/24  0540 01/27/24  1300 01/28/24  0545 01/29/24  0454   SODIUM 131* 129* 128* 130*   POTASSIUM 3.8 3.7 3.4* 3.5*   CHLORIDE 104 101 97 97   CO2 14* 15* 19* 20   BUN 77* 72* 68* 57*   CREATININE 6.22* 5.85* 6.06* 5.33*   MAGNESIUM 1.8  --  1.9 1.9   PHOSPHORUS 3.9  --  3.9 3.9   CALCIUM 6.6* 7.0* 6.8* 6.5*     Recent Labs     01/27/24  0540 01/27/24  1300 01/28/24  0545 01/29/24  0454   ALTSGPT 9  --  10 10   ASTSGOT 20  --  20 21   ALKPHOSPHAT 56  --  67 69   TBILIRUBIN 0.4  --  0.5 0.7   GLUCOSE 195* 231* 254* 139*     Recent Labs     01/27/24  0540 01/28/24  0545 01/29/24  0454   WBC 20.8* 17.2* 19.9*   NEUTSPOLYS 85.10* 84.60* 83.90*   LYMPHOCYTES 7.20* 8.30* 7.70*   MONOCYTES 5.30 5.50 5.70   EOSINOPHILS 0.10 0.60 0.70   BASOPHILS 0.10 0.20 0.20   ASTSGOT 20 20 21   ALTSGPT 9 10 10   ALKPHOSPHAT 56 67 69   TBILIRUBIN 0.4 0.5 0.7     Recent Labs     01/27/24  0540 01/28/24  0545 01/29/24  0454   RBC 2.36* 2.59* 2.57*   HEMOGLOBIN 7.0* 7.3* 7.6*   HEMATOCRIT 20.6* 21.9* 21.8*   PLATELETCT 298 293 285   PROTHROMBTM 17.3*  --   --    INR 1.40*  --   --        Imaging  No imaging today    Assessment/Plan  * Septic shock (HCC)- (present on admission)  Assessment & Plan  Shock has resolved  Continue linezolid plus Zosyn  Cultures remain negative  Pending operative plan from orthopedics    Soft tissue infection of foot- (present on admission)  Assessment & Plan  Left foot wound, diabetic  Continue linezolid and Zosyn  Pending orthopedic surgical plan    Acute renal failure superimposed on stage 3b chronic kidney disease (HCC)- (present on admission)  Assessment & Plan  Likely ATN due to  sepsis overlying diabetic nephropathy  Follows with [facility]  IVF  Renal dose meds, avoid nephrotoxins  Strict I/Os  Follow renal function    Unspecified fracture of left toe(s), initial encounter for open fracture- (present on admission)  Assessment & Plan  Likely pathologic due to osteo  Ortho consulted in ED    Osteomyelitis of left foot (HCC)- (present on admission)  Assessment & Plan  Continue zosyn, zyvox    Type 2 diabetes mellitus with kidney complication, with long-term current use of insulin (HCC)- (present on admission)  Assessment & Plan  Goal blood glucose 140-180  Continue home basal insulin, sliding scale insulin, accuchecks  hypoglycemia protocol  A1c 7.2 two weeks ago    Normocytic anemia- (present on admission)  Assessment & Plan  Chronic, follow  Restrictive transfusion strategy         VTE:   Heparin 3 times daily  Ulcer: Not Indicated  Lines: Central Line  Ongoing indication addressed    I have performed a physical exam and reviewed and updated ROS and Plan today (1/29/2024). In review of yesterday's note (1/28/2024), there are no changes except as documented above.     Discussed patient condition and risk of morbidity and/or mortality with Hospitalist, RN, RT, Pharmacy, Code status disscussed, Charge nurse / hot rounds, Patient, and orthopedics    Ok to transfer patient out of ICU today. [facility] will sign off at transfer. Please call with questions.

## 2024-01-29 NOTE — PROGRESS NOTES
Ortho Update:  - MRI and vascular studies ordered for review  - Likely no surgery tomorrow, possibly Tuesday depending on results  - Will update when possible

## 2024-01-29 NOTE — HOSPITAL COURSE
1/25-admitted with sepsis, AXEL secondary to diabetic left toe infection/osteo-  1/26-received 3.1L crystalloid, poor urine output, starting bicarb drip, nephrology consultation, Ortho will proceed with amputation when clinically stabilized, wound care following  1/27 - improved, ortho following  1/28 - Ortho determining when to take to the operating room    1/29 - Transfer to [facility].

## 2024-01-29 NOTE — PROGRESS NOTES
Glucometer not communicating with epic. FSBS 27. Rechecked from blood drawn from A line 116. Will continue to check q 1 hr.

## 2024-01-29 NOTE — PROGRESS NOTES
[facility] Services Progress Note     HD tx #1 x 2 hrs today.  HD tx initiated at 1345 and ended at 1546.    Pt is alert, oriented x 4, not in any signs of distress at this time. Dialysis consent signedb by the pt. Newly inserted right chest tunneled catheter with clean, dry and intact dressing. No signs of infection noted. Aspirated 3mL on both catheter ports and saline flushed, both patent. Epoetin 5,000 units IVP given with HD. HD treatment completed and tolerated well. UF goal achieved without issues.    UF Net: 0.5 L    Post tx: Right chest tunneled catheter with clean, dry and intact dressing. No signs of infection. Both catheter ports saline flushed, heparin locked as prescribed, clamped and capped.    Report given to primary RN PAULA Frankel. See electronic flowsheets for additional information.

## 2024-01-29 NOTE — PROGRESS NOTES
Nephrology Daily Progress Note    Date of Service  1/29/2024    Chief Complaint  53 y.o. 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    Interval Problem Update     Physical Exam  Pulse:  [72-93] 78  Resp:  [11-35] 14  BP: (106-187)/(59-80) 187/80  SpO2:  [90 %-98 %] 97 %      GEN: alert and oriented in no acute distress.  HEENT: moist oropharygneal mucous membranes  CV:RRR, normal s1 s2  PULM: clear to auscultation bilaterally  ABD: soft non tender non distended  EXT: warm well perfused, no lower extremity edema   ACCESS: R IJ TDC  Fluids    Intake/Output Summary (Last 24 hours) at 1/29/2024 1341  Last data filed at 1/29/2024 1000  Gross per 24 hour   Intake 3158.89 ml   Output 620 ml   Net 2538.89 ml         Laboratory  Labs reviewed, pertinent labs below.  Recent Labs     01/27/24  0540 01/28/24  0545 01/29/24  0454   WBC 20.8* 17.2* 19.9*   RBC 2.36* 2.59* 2.57*   HEMOGLOBIN 7.0* 7.3* 7.6*   HEMATOCRIT 20.6* 21.9* 21.8*   MCV 87.3 84.6 84.8   MCH 29.7 28.2 29.6   MCHC 34.0 33.3 34.9   RDW 45.3 43.3 43.8   PLATELETCT 298 293 285   MPV 9.8 9.6 9.3       Recent Labs     01/27/24  1300 01/28/24  0545 01/29/24  0454   SODIUM 129* 128* 130*   POTASSIUM 3.7 3.4* 3.5*   CHLORIDE 101 97 97   CO2 15* 19* 20   GLUCOSE 231* 254* 139*   BUN 72* 68* 57*   CREATININE 5.85* 6.06* 5.33*   CALCIUM 7.0* 6.8* 6.5*       Recent Labs     01/27/24  0540   INR 1.40*             URINALYSIS:  Lab Results   Component Value Date/Time    COLORURINE Yellow 01/09/2024 2142    CLARITY Clear 01/09/2024 2142    SPECGRAVITY 1.013 01/09/2024 2142    PHURINE 5.0 01/09/2024 2142    KETONES Negative 01/09/2024 2142    PROTEINURIN 300 (A) 01/09/2024 2142    BILIRUBINUR Negative 01/09/2024 2142    UROBILU 0.2 01/09/2024 2142    NITRITE Negative 01/09/2024 2142    LEUKESTERAS Negative 01/09/2024 2142     OCCULTBLOOD Small (A) 01/09/2024 2142     [facility]  Lab Results   Component Value Date/Time    TOTPROTUR 258.0 (H) 05/03/2023 1210      Lab Results   Component Value Date/Time    CREATININEU 27.57 05/03/2023 1210         Imaging interpreted by radiologist. Imaging reports reviewed with pertinent findings below  IR-SABA,FANISHONG PLACEMENT >5   Final Result      1. ULTRASOUND AND FLUOROSCOPIC GUIDED PLACEMENT OF A Right INTERNAL JUGULAR 14.5 Sinhala GLIDE PATH TUNNELED DIALYSIS CATHETER.      2. THE HEMODIALYSIS CATHETER MAY BE USED IMMEDIATELY AS CLINICALLY INDICATED. FLUSHES PER PROTOCOL.         US-FLOWER SINGLE LEVEL BILAT   Final Result      DX-CHEST-PORTABLE (1 VIEW)   Final Result      CT-FOOT W/O PLUS RECONS LEFT   Final Result      1.  Findings are consistent with osteomyelitis and pathologic fracture involving the proximal phalanx of the fifth digit.      2.  Soft tissue swelling and emphysema is present around the fifth digit and in the plantar soft tissues of the foot. These findings are consistent with cellulitis.      DX-FOOT-COMPLETE 3+ LEFT   Final Result      1.  Comminuted fracture proximal phalanx fifth digit is identified which could be pathologic and related to osteomyelitis.      2.  Soft tissue swelling and emphysema in the fifth digit which could be due to cellulitis.      DX-CHEST-PORTABLE (1 VIEW)   Final Result         No acute cardiac or pulmonary abnormality is identified.      MR-FOOT-WITH LEFT    (Results Pending)         Current Facility-Administered Medications   Medication Dose Route Frequency Provider Last Rate Last Admin    heparin injection 5,000 Units  5,000 Units Subcutaneous Q8HRS Sam Givens M.D.   5,000 Units at 01/29/24 0924    HEPARIN SODIUM (PORCINE) 1000 UNIT/ML INJ SOLN             NS (Bolus) 0.9 % infusion 500 mL  500 mL Intravenous Once PRN Gael Samayoa M.D.        ceFAZolin (Ancef) 2 g in  mL IVPB  2 g Intravenous Once Gael Samayoa M.D.        fentaNYL  (Sublimaze) injection 12.5-50 mcg  12.5-50 mcg Intravenous PRN Gael Samayoa M.D.   50 mcg at 01/29/24 1254    midazolam (Versed) injection 0.5-2 mg  0.5-2 mg Intravenous PRN Gael Samayoa M.D.   2 mg at 01/29/24 1249    ondansetron (Zofran) syringe/vial injection 4 mg  4 mg Intravenous PRN Gael Samayoa M.D.        oxyCODONE immediate-release (Roxicodone) tablet 5 mg  5 mg Oral Q3HRS PRN Gael Samayoa M.D.        oxyCODONE immediate release (Roxicodone) tablet 10 mg  10 mg Oral Q3HRS PRN Gael Samayoa M.D.        HYDROmorphone (Dilaudid) injection 0.5 mg  0.5 mg Intravenous Q3HRS PRN Gael Samayoa M.D.        insulin regular (HumuLIN R,NovoLIN R) injection  3-14 Units Subcutaneous 4X/DAY ACHS Alejandro Mcbride M.D.   3 Units at 01/28/24 1958    And    dextrose 50% (D50W) injection 25 g  25 g Intravenous Q15 MIN PRN Alejandro Mcbride M.D.        sodium bicarbonate 75 mEq in 1/2 NS 1,000 mL infusion  75 mEq Intravenous Continuous Varun Ortiz M.D. 100 mL/hr at 01/29/24 0853 75 mEq at 01/29/24 0853    NS (Bolus) 0.9 % infusion 250 mL  250 mL Intravenous DIALYSIS PRN Varun Ortiz M.D.        epoetin (Retacrit) 5,000 Units injection (Dialysis Use Only)  5,000 Units Intravenous MO, WE + FR Varun Ortiz M.D.        LORazepam (Ativan) injection 1 mg  1 mg Intravenous Q6HRS PRN Alejandro Mcbride M.D.   1 mg at 01/27/24 1556    Linezolid (Zyvox) premix 600 mg  600 mg Intravenous Q12HRS Tino Romano Jr., D.O.   Stopped at 01/29/24 0618    atorvastatin (Lipitor) tablet 80 mg  80 mg Oral Q EVENING Tino Romano Jr., D.O.   80 mg at 01/28/24 1736    omeprazole (PriLOSEC) capsule 20 mg  20 mg Oral QAM Tino Romano Jr., D.O.   20 mg at 01/29/24 0520    senna-docusate (Pericolace Or Senokot S) 8.6-50 MG per tablet 2 Tablet  2 Tablet Oral BID Tino Romano Jr., JD.O.        And    polyethylene glycol/lytes (Miralax) Packet 1 Packet  1 Packet Oral QDAY PRN Tino Romano Jr., D.O.        And    bisacodyl (Dulcolax)  suppository 10 mg  10 mg Rectal QDAY PRN Tino Romano Jr. D.O.        ondansetron (Zofran) syringe/vial injection 4 mg  4 mg Intravenous Q4HRS PRN JD Doss Jr..OAsiya        ondansetron (Zofran ODT) dispertab 4 mg  4 mg Oral Q4HRS PRN JD Doss Jr..O.        promethazine (Phenergan) tablet 12.5-25 mg  12.5-25 mg Oral Q4HRS PRN JD Doss Jr..O.        promethazine (Phenergan) suppository 12.5-25 mg  12.5-25 mg Rectal Q4HRS PRN JD Doss Jr..O.        prochlorperazine (Compazine) injection 5-10 mg  5-10 mg Intravenous Q4HRS PRN JD Doss Jr..O.        acetaminophen (Tylenol) tablet 650 mg  650 mg Oral Q6HRS PRN Tino Romano Jr. D.O.   650 mg at 01/28/24 1642    LR (Bolus) infusion 500 mL  500 mL Intravenous Once PRN Tino Romano Jr. D.O.        piperacillin-tazobactam (Zosyn) 4.5 g in  mL IVPB  4.5 g Intravenous Q12HRS JD Doss Jr..O.   Stopped at 01/29/24 0916    aspirin EC tablet 81 mg  81 mg Oral DAILY Tino Romano Jr. D.O.   81 mg at 01/29/24 0520    cholestyramine (Questran) 4 GM powder 4 g  1 Packet Oral BID Tino Romano Jr. D.O.   4 g at 01/28/24 1957       Kidney biopsy 3/29/2022 interpreted by Dr. Ac  Diagnosis: Chronic glomerular microangiopathic changes.  Nodular diabetic glomerulosclerosis.  Comment: In light of the clinical history, the microangiopathic changes are likely secondary to anti-VEGF inhibitor use.  Other causes of chronic thrombotic microangiopathy are also on the differential.  There are no acute thrombi.  Light microscopy notable for approximately 50% interstitial fibrosis and tubular atrophy as well as evidence of acute tubular injury      Assessment/Plan  53 y.o. 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     1.  Progression of CKD to ESRD.  CKD due to  biopsy-proven diabetic nephropathy and chronic thrombotic microangiopathy (TMA) Await tunneled dialysis catheter placement.  Will plan to initiate hemodialysis 1/31/2024, initially deferred due to persistent leukocytosis..    2.  Hyponatremia, persistent.  Recommend 1 L fluid restriction.  Check sodium level daily.     3.  Metabolic acidosis, improving. due to advanced chronic disease.  Remains off of bicarbonate infusion.  To be addressed with hemodialysis as above.        4.  Azotemia, with high BUN and uremic symptoms.  Recommend dialysis initiation as above.  Continue to monitor with BMP.     5.  Left diabetic foot infection placated by osteomyelitis of the left metatarsal head.  Appreciate orthopedic surgery recommendations with plan for fifth ray amputation through metatarsal shaft, irrigation debridement of left plantar foot abscess on 1/30/2024      6.  Anemia of chronic kidney disease-continue Epogen with hemodialysis Monday Wednesday Friday.  Transfuse packed red blood cells to maintain hemoglobin greater than 7.    Plan of care discussed with Dr. Eldon Leon MD  Nephrology  [facility]

## 2024-01-29 NOTE — OR SURGEON
Immediate Post- Operative Note        Findings: AXEL      Procedure(s): Tunnelled RIJ HD Cath      Estimated Blood Loss: Less than 5 ml        Complications: None            1/29/2024     1:03 PM     Gael Samayoa M.D.

## 2024-01-30 ENCOUNTER — ANESTHESIA (OUTPATIENT)
Dept: SURGERY | Facility: MEDICAL CENTER | Age: 54
DRG: 853 | End: 2024-01-30
Payer: MEDICARE

## 2024-01-30 ENCOUNTER — ANESTHESIA EVENT (OUTPATIENT)
Dept: SURGERY | Facility: MEDICAL CENTER | Age: 54
DRG: 853 | End: 2024-01-30
Payer: MEDICARE

## 2024-01-30 PROBLEM — E83.51 HYPOCALCEMIA: Status: ACTIVE | Noted: 2024-01-30

## 2024-01-30 LAB
ANION GAP SERPL CALC-SCNC: 13 MMOL/L (ref 7–16)
BACTERIA BLD CULT: NORMAL
BACTERIA BLD CULT: NORMAL
BASOPHILS # BLD AUTO: 0.2 % (ref 0–1.8)
BASOPHILS # BLD: 0.03 K/UL (ref 0–0.12)
BUN SERPL-MCNC: 45 MG/DL (ref 8–22)
CA-I SERPL-SCNC: 1 MMOL/L (ref 1.1–1.3)
CALCIUM SERPL-MCNC: 6.4 MG/DL (ref 8.5–10.5)
CHLORIDE SERPL-SCNC: 96 MMOL/L (ref 96–112)
CO2 SERPL-SCNC: 22 MMOL/L (ref 20–33)
CREAT SERPL-MCNC: 4.13 MG/DL (ref 0.5–1.4)
EOSINOPHIL # BLD AUTO: 0.08 K/UL (ref 0–0.51)
EOSINOPHIL NFR BLD: 0.5 % (ref 0–6.9)
ERYTHROCYTE [DISTWIDTH] IN BLOOD BY AUTOMATED COUNT: 43 FL (ref 35.9–50)
GFR SERPLBLD CREATININE-BSD FMLA CKD-EPI: 16 ML/MIN/1.73 M 2
GLUCOSE BLD STRIP.AUTO-MCNC: 150 MG/DL (ref 65–99)
GLUCOSE BLD STRIP.AUTO-MCNC: 178 MG/DL (ref 65–99)
GLUCOSE BLD STRIP.AUTO-MCNC: 231 MG/DL (ref 65–99)
GLUCOSE SERPL-MCNC: 178 MG/DL (ref 65–99)
GRAM STN SPEC: NORMAL
GRAM STN SPEC: NORMAL
HCT VFR BLD AUTO: 22.3 % (ref 42–52)
HGB BLD-MCNC: 7.4 G/DL (ref 14–18)
IMM GRANULOCYTES # BLD AUTO: 0.13 K/UL (ref 0–0.11)
IMM GRANULOCYTES NFR BLD AUTO: 0.8 % (ref 0–0.9)
LYMPHOCYTES # BLD AUTO: 1.78 K/UL (ref 1–4.8)
LYMPHOCYTES NFR BLD: 11.3 % (ref 22–41)
MCH RBC QN AUTO: 28.1 PG (ref 27–33)
MCHC RBC AUTO-ENTMCNC: 33.2 G/DL (ref 32.3–36.5)
MCV RBC AUTO: 84.8 FL (ref 81.4–97.8)
MONOCYTES # BLD AUTO: 1.04 K/UL (ref 0–0.85)
MONOCYTES NFR BLD AUTO: 6.6 % (ref 0–13.4)
NEUTROPHILS # BLD AUTO: 12.71 K/UL (ref 1.82–7.42)
NEUTROPHILS NFR BLD: 80.6 % (ref 44–72)
NRBC # BLD AUTO: 0 K/UL
NRBC BLD-RTO: 0 /100 WBC (ref 0–0.2)
PLATELET # BLD AUTO: 271 K/UL (ref 164–446)
PMV BLD AUTO: 9.5 FL (ref 9–12.9)
POTASSIUM SERPL-SCNC: 3.6 MMOL/L (ref 3.6–5.5)
RBC # BLD AUTO: 2.63 M/UL (ref 4.7–6.1)
SIGNIFICANT IND 70042: NORMAL
SITE SITE: NORMAL
SODIUM SERPL-SCNC: 131 MMOL/L (ref 135–145)
SOURCE SOURCE: NORMAL
WBC # BLD AUTO: 15.8 K/UL (ref 4.8–10.8)

## 2024-01-30 PROCEDURE — 87075 CULTR BACTERIA EXCEPT BLOOD: CPT

## 2024-01-30 PROCEDURE — 28003 TREATMENT OF FOOT INFECTION: CPT | Mod: 79,59,LT | Performed by: ORTHOPAEDIC SURGERY

## 2024-01-30 PROCEDURE — A9270 NON-COVERED ITEM OR SERVICE: HCPCS | Performed by: INTERNAL MEDICINE

## 2024-01-30 PROCEDURE — 0KBW0ZX EXCISION OF LEFT FOOT MUSCLE, OPEN APPROACH, DIAGNOSTIC: ICD-10-PCS | Performed by: ORTHOPAEDIC SURGERY

## 2024-01-30 PROCEDURE — 87070 CULTURE OTHR SPECIMN AEROBIC: CPT

## 2024-01-30 PROCEDURE — 160002 HCHG RECOVERY MINUTES (STAT): Performed by: ORTHOPAEDIC SURGERY

## 2024-01-30 PROCEDURE — 82962 GLUCOSE BLOOD TEST: CPT

## 2024-01-30 PROCEDURE — 0Y6N0ZF DETACHMENT AT LEFT FOOT, PARTIAL 5TH RAY, OPEN APPROACH: ICD-10-PCS | Performed by: ORTHOPAEDIC SURGERY

## 2024-01-30 PROCEDURE — 700111 HCHG RX REV CODE 636 W/ 250 OVERRIDE (IP): Performed by: INTERNAL MEDICINE

## 2024-01-30 PROCEDURE — 700101 HCHG RX REV CODE 250: Performed by: ORTHOPAEDIC SURGERY

## 2024-01-30 PROCEDURE — 700111 HCHG RX REV CODE 636 W/ 250 OVERRIDE (IP)

## 2024-01-30 PROCEDURE — 700105 HCHG RX REV CODE 258: Performed by: HOSPITALIST

## 2024-01-30 PROCEDURE — 700105 HCHG RX REV CODE 258: Performed by: INTERNAL MEDICINE

## 2024-01-30 PROCEDURE — 87147 CULTURE TYPE IMMUNOLOGIC: CPT

## 2024-01-30 PROCEDURE — 770006 HCHG ROOM/CARE - MED/SURG/GYN SEMI*

## 2024-01-30 PROCEDURE — 87077 CULTURE AEROBIC IDENTIFY: CPT | Mod: 91

## 2024-01-30 PROCEDURE — 36415 COLL VENOUS BLD VENIPUNCTURE: CPT

## 2024-01-30 PROCEDURE — 700111 HCHG RX REV CODE 636 W/ 250 OVERRIDE (IP): Performed by: STUDENT IN AN ORGANIZED HEALTH CARE EDUCATION/TRAINING PROGRAM

## 2024-01-30 PROCEDURE — 700111 HCHG RX REV CODE 636 W/ 250 OVERRIDE (IP): Mod: JZ | Performed by: ANESTHESIOLOGY

## 2024-01-30 PROCEDURE — 87076 CULTURE ANAEROBE IDENT EACH: CPT | Mod: 91

## 2024-01-30 PROCEDURE — 80048 BASIC METABOLIC PNL TOTAL CA: CPT

## 2024-01-30 PROCEDURE — 90935 HEMODIALYSIS ONE EVALUATION: CPT

## 2024-01-30 PROCEDURE — 87205 SMEAR GRAM STAIN: CPT | Mod: 91

## 2024-01-30 PROCEDURE — 28003 TREATMENT OF FOOT INFECTION: CPT | Mod: ASROC,79,59,LT | Performed by: PHYSICIAN ASSISTANT

## 2024-01-30 PROCEDURE — 87186 SC STD MICRODIL/AGAR DIL: CPT | Mod: 91

## 2024-01-30 PROCEDURE — 99232 SBSQ HOSP IP/OBS MODERATE 35: CPT | Performed by: INTERNAL MEDICINE

## 2024-01-30 PROCEDURE — 160027 HCHG SURGERY MINUTES - 1ST 30 MINS LEVEL 2: Performed by: ORTHOPAEDIC SURGERY

## 2024-01-30 PROCEDURE — 28810 AMPUTATION TOE & METATARSAL: CPT | Mod: 79,LT | Performed by: ORTHOPAEDIC SURGERY

## 2024-01-30 PROCEDURE — 82330 ASSAY OF CALCIUM: CPT

## 2024-01-30 PROCEDURE — 160048 HCHG OR STATISTICAL LEVEL 1-5: Performed by: ORTHOPAEDIC SURGERY

## 2024-01-30 PROCEDURE — 85025 COMPLETE CBC W/AUTO DIFF WBC: CPT

## 2024-01-30 PROCEDURE — 28810 AMPUTATION TOE & METATARSAL: CPT | Mod: ASROC,79,LT | Performed by: PHYSICIAN ASSISTANT

## 2024-01-30 PROCEDURE — 700111 HCHG RX REV CODE 636 W/ 250 OVERRIDE (IP): Mod: JZ | Performed by: HOSPITALIST

## 2024-01-30 PROCEDURE — 99222 1ST HOSP IP/OBS MODERATE 55: CPT | Mod: 24,57 | Performed by: ORTHOPAEDIC SURGERY

## 2024-01-30 PROCEDURE — 87015 SPECIMEN INFECT AGNT CONCNTJ: CPT

## 2024-01-30 PROCEDURE — 160009 HCHG ANES TIME/MIN: Performed by: ORTHOPAEDIC SURGERY

## 2024-01-30 PROCEDURE — 700111 HCHG RX REV CODE 636 W/ 250 OVERRIDE (IP): Mod: JZ | Performed by: INTERNAL MEDICINE

## 2024-01-30 PROCEDURE — 160035 HCHG PACU - 1ST 60 MINS PHASE I: Performed by: ORTHOPAEDIC SURGERY

## 2024-01-30 PROCEDURE — 700102 HCHG RX REV CODE 250 W/ 637 OVERRIDE(OP): Performed by: INTERNAL MEDICINE

## 2024-01-30 PROCEDURE — 99233 SBSQ HOSP IP/OBS HIGH 50: CPT | Performed by: HOSPITALIST

## 2024-01-30 RX ORDER — DIPHENHYDRAMINE HYDROCHLORIDE 50 MG/ML
12.5 INJECTION INTRAMUSCULAR; INTRAVENOUS
Status: DISCONTINUED | OUTPATIENT
Start: 2024-01-30 | End: 2024-01-30 | Stop reason: HOSPADM

## 2024-01-30 RX ORDER — HYDROMORPHONE HYDROCHLORIDE 1 MG/ML
0.2 INJECTION, SOLUTION INTRAMUSCULAR; INTRAVENOUS; SUBCUTANEOUS
Status: DISCONTINUED | OUTPATIENT
Start: 2024-01-30 | End: 2024-01-30 | Stop reason: HOSPADM

## 2024-01-30 RX ORDER — HYDROMORPHONE HYDROCHLORIDE 1 MG/ML
0.1 INJECTION, SOLUTION INTRAMUSCULAR; INTRAVENOUS; SUBCUTANEOUS
Status: DISCONTINUED | OUTPATIENT
Start: 2024-01-30 | End: 2024-01-30 | Stop reason: HOSPADM

## 2024-01-30 RX ORDER — HALOPERIDOL 5 MG/ML
1 INJECTION INTRAMUSCULAR
Status: DISCONTINUED | OUTPATIENT
Start: 2024-01-30 | End: 2024-01-30 | Stop reason: HOSPADM

## 2024-01-30 RX ORDER — OXYCODONE HCL 5 MG/5 ML
5 SOLUTION, ORAL ORAL
Status: DISCONTINUED | OUTPATIENT
Start: 2024-01-30 | End: 2024-01-30 | Stop reason: HOSPADM

## 2024-01-30 RX ORDER — MEPERIDINE HYDROCHLORIDE 25 MG/ML
6.25 INJECTION INTRAMUSCULAR; INTRAVENOUS; SUBCUTANEOUS
Status: DISCONTINUED | OUTPATIENT
Start: 2024-01-30 | End: 2024-01-30 | Stop reason: HOSPADM

## 2024-01-30 RX ORDER — SODIUM HYPOCHLORITE 1.25 MG/ML
SOLUTION TOPICAL
Status: DISCONTINUED | OUTPATIENT
Start: 2024-01-30 | End: 2024-01-30 | Stop reason: HOSPADM

## 2024-01-30 RX ORDER — HYDROMORPHONE HYDROCHLORIDE 1 MG/ML
0.4 INJECTION, SOLUTION INTRAMUSCULAR; INTRAVENOUS; SUBCUTANEOUS
Status: DISCONTINUED | OUTPATIENT
Start: 2024-01-30 | End: 2024-01-30 | Stop reason: HOSPADM

## 2024-01-30 RX ORDER — HEPARIN SODIUM 1000 [USP'U]/ML
INJECTION, SOLUTION INTRAVENOUS; SUBCUTANEOUS
Status: COMPLETED
Start: 2024-01-30 | End: 2024-01-30

## 2024-01-30 RX ORDER — ONDANSETRON 2 MG/ML
4 INJECTION INTRAMUSCULAR; INTRAVENOUS
Status: DISCONTINUED | OUTPATIENT
Start: 2024-01-30 | End: 2024-01-30 | Stop reason: HOSPADM

## 2024-01-30 RX ORDER — OXYCODONE HCL 5 MG/5 ML
10 SOLUTION, ORAL ORAL
Status: DISCONTINUED | OUTPATIENT
Start: 2024-01-30 | End: 2024-01-30 | Stop reason: HOSPADM

## 2024-01-30 RX ADMIN — PROPOFOL 150 MG: 10 INJECTION, EMULSION INTRAVENOUS at 10:41

## 2024-01-30 RX ADMIN — CALCIUM CHLORIDE 1000 MG: 100 INJECTION, SOLUTION INTRAVENOUS at 09:47

## 2024-01-30 RX ADMIN — LINEZOLID 600 MG: 600 INJECTION, SOLUTION INTRAVENOUS at 04:44

## 2024-01-30 RX ADMIN — CHOLESTYRAMINE 4 G: 4 POWDER, FOR SUSPENSION ORAL at 17:30

## 2024-01-30 RX ADMIN — DOCUSATE SODIUM 50 MG AND SENNOSIDES 8.6 MG 2 TABLET: 8.6; 5 TABLET, FILM COATED ORAL at 04:35

## 2024-01-30 RX ADMIN — OMEPRAZOLE 20 MG: 20 CAPSULE, DELAYED RELEASE ORAL at 04:35

## 2024-01-30 RX ADMIN — HEPARIN SODIUM 5000 UNITS: 5000 INJECTION, SOLUTION INTRAVENOUS; SUBCUTANEOUS at 20:45

## 2024-01-30 RX ADMIN — LINEZOLID 600 MG: 600 INJECTION, SOLUTION INTRAVENOUS at 18:20

## 2024-01-30 RX ADMIN — PIPERACILLIN AND TAZOBACTAM 4.5 G: 4; .5 INJECTION, POWDER, FOR SOLUTION INTRAVENOUS at 17:25

## 2024-01-30 RX ADMIN — HEPARIN SODIUM 3600 UNITS: 1000 INJECTION, SOLUTION INTRAVENOUS; SUBCUTANEOUS at 15:47

## 2024-01-30 RX ADMIN — SODIUM BICARBONATE 75 MEQ: 84 INJECTION, SOLUTION INTRAVENOUS at 06:31

## 2024-01-30 RX ADMIN — FENTANYL CITRATE 50 MCG: 50 INJECTION, SOLUTION INTRAMUSCULAR; INTRAVENOUS at 10:41

## 2024-01-30 RX ADMIN — PIPERACILLIN AND TAZOBACTAM 4.5 G: 4; .5 INJECTION, POWDER, FOR SOLUTION INTRAVENOUS at 04:35

## 2024-01-30 RX ADMIN — FENTANYL CITRATE 50 MCG: 50 INJECTION, SOLUTION INTRAMUSCULAR; INTRAVENOUS at 10:54

## 2024-01-30 ASSESSMENT — ENCOUNTER SYMPTOMS
SHORTNESS OF BREATH: 0
FEVER: 0
CHILLS: 0

## 2024-01-30 ASSESSMENT — PAIN SCALES - GENERAL: PAIN_LEVEL: 0

## 2024-01-30 NOTE — PROGRESS NOTES
Received RN report, assumed care for this patient  AO x 4, irritable, Denies any pain  Assisted in bed, newly admit to the floor  HD tunnel cath on  his Right IJ- Gauze/ transparent dressing in placed- shadowing note. Left IJ triple lumen, patent and flushed with saline  203 fsbg and insulin coverage given  MRI Left foot done, for MD interpretation  Reviewed plan of care, agreed and understood  NPO post midnight for possible surgery for Left foot wound  Refused bed alarm, call appropriately  Call light and personal items within reached. Kept safe and continue monitoring.

## 2024-01-30 NOTE — ANESTHESIA TIME REPORT
Anesthesia Start and Stop Event Times       Date Time Event    1/30/2024 10:29 AM Ready for Procedure    1/30/2024 10:37 AM Anesthesia Start    1/30/2024 11:12 AM Anesthesia Stop          Responsible Staff  01/30/24      Name Role Begin End    Sandra Fisher M.D. Anesthesiologist 01/30/24 10:37 AM 01/30/24 11:12 AM          Overtime Reason:  no overtime (within assigned shift)    Comments:

## 2024-01-30 NOTE — OP REPORT
DATE: 1/30/2024    PREOPERATIVE DIAGNOSIS: 1. Osteomyelitis left fifth metatarsal head 2.  Left plantar foot abscess    POSTOPERATIVE DIAGNOSIS: Same    PROCEDURE PERFORMED:  1.  Left fifth ray amputation through metatarsal shaft  2. irrigation and debridement of left plantar foot abscess                                                      SURGEON: Celestino Segura M.D.     ASSISTANT: Arthur José    ANESTHESIOLOGIST: Sandra Fisher MD.    ANESTHESIA: General    ESTIMATED BLOOD LOSS: 0 mL    INDICATIONS: The patient is a 53 y.o. male with a severe left plantar foot abscess and fifth ray osteomyelitis after untreated diabetic foot infection.  I discussed the risks and benefits of the procedure, including the risks of infection, wound healing complication, neurovascular injury, need for repeat irrigation and debridement and the medical risks of anesthesia including DVT, PE, MI, stroke, and death.  Benefits include eradication of infection and resolution of sepsis.  Alternatives to surgery were also discussed, including non-operative management.  The patient signed the informed consent and the operative extremity was marked.      PROCEDURE:  The patient underwent anesthesia, and was positioned supine on the operating room table and all bony prominences were well padded.  Preoperative antibiotics were held until cultures could be obtained. Sequential compression devices were employed. The correct operative site was prepped and draped into a sterile field. A procedural pause was conducted to verify correct patient, correct extremity, presence of the surgeons initials on the operative extremity.    The left fifth ray was amputated back to healthy bone to the metatarsal shaft.  There was a massive amount of necrotic tissue and foul-smelling discharge which was sent for culture and sensitivity.  This fifth ray osteomyelitis was connected via a multiloculated abscess to the plantar surface of the foot.  The plantar  skin was dead and a 2 x 6 cm swath with foul-smelling skin dead fat and soft tissue.  All necrotic tissue was excised with a knife and rongeur of skin subcutaneous tissue and underlying muscle down to healthy bleeding tissue.  Due to the massive contamination of the wound it was elected not to close the wound but to pack both the plantar surface and fifth ray amputation with Dakins solution soaked Kerlix sponge.  Sterile dressings were applied.     The patient tolerated the procedure well. There were no apparent complications. All sponge, needle, and instrument counts were correct on two separate occasions. They were awakened, extubated, and transferred to the recovery room in satisfactory condition.     The use of sleep hours as a surgical assistant was necessary for assistance with exposure, retraction, and closure.    Post-Operative Plan:    1.  The patient should remain full weightbearing through heel  on their operative extremity.  Gait aids (crutch or crutches, cane, walker) may be used as needed, and may be discontinued when no longer required.  2.  IV antibiotics - will be given postoperatively and adjusted by the Infectious Disease service as dictated by culture results.  3.  Return to operating room in 48 hours for repeat irrigation and debridement.  He most likely will need below-knee amputation given his plantar skin loss and we will talk to him about these options prior to surgical intervention  ____________________________________   Celestion Segura M.D.   DD: 1/30/2024  11:25 AM

## 2024-01-30 NOTE — PROGRESS NOTES
4 Eyes Skin Assessment Completed by JULIANO Camara and Christine CHAUDHARI RN.    Head WDL  Ears Redness and Blanching  Nose WDL  Mouth WDL  Neck Incision - Left IJ  Breast/Chest WDL  Shoulder Blades WDL  Spine WDL  (R) Arm/Elbow/Hand Redness, Blanching, Bruising, and Scab  (L) Arm/Elbow/Hand Redness, Blanching, Bruising, and Scab  Abdomen WDL  Groin Redness  Scrotum/Coccyx/Buttocks Redness and Blanching  (R) Leg WDL  (L) Leg Swelling  (R) Heel/Foot/Toe Redness and Blanching, stump from previous amputation, scabs on shin  (L) Heel/Foot/Toe Redness, Blanching, Bruising, and Swelling          Devices In Places ECG, Blood Pressure Cuff, and Pulse Ox      Interventions In Place Pillows and Low Air Loss Mattress    Possible Skin Injury Yes    Pictures Uploaded Into Epic Yes  Wound Consult Placed Yes  RN Wound Prevention Protocol Ordered Yes

## 2024-01-30 NOTE — ASSESSMENT & PLAN NOTE
Noted on CT scan and MRI.  Has extensive soft tissue infection with osteomyelitis.  Orthopedics on board, s/p left fifth ray amputation through metatarsal shaft with irrigation and debridement of left plantar foot abscess.  Orthopedics planning for BKA today 2/1/2024.  Completed antibiotics.  Post BKA, will have achieved adequate source control and has no further antibiotic needs.  Continue pain control.

## 2024-01-30 NOTE — WOUND TEAM
Wound consult received regarding LLE/L Foot. Chart and images reviewed. Pt was evaluated by wound team on 1/26/24 for same location and is currently on follow up with wound team for Thursday 2/1/24. Wound consult completed.

## 2024-01-30 NOTE — ANESTHESIA PREPROCEDURE EVALUATION
Case: [id number] Date/Time: 01/30/24 1011    Procedure: AMPUTATION, TOE- 5TH (Left)    Location: [facility] OR 03 / SURGERY [facility]    Surgeons: Celestino Segura M.D.            Relevant Problems   CARDIAC   (positive) Coronary artery disease involving native coronary artery of native heart without angina pectoris   (positive) Hypertension   (positive) NSTEMI (non-ST elevated myocardial infarction) (HCC)   (positive) Presence of stent in coronary artery         (positive) Acute renal failure superimposed on stage 3b chronic kidney disease (HCC)   (positive) Hypertensive heart and chronic kidney disease with heart failure and stage 1 through stage 4 chronic kidney disease, or unspecified chronic kidney disease (HCC)      ENDO   (positive) Type 2 diabetes mellitus with kidney complication, with long-term current use of insulin (HCC)   (positive) Type 2 diabetes mellitus with proliferative retinopathy without macular edema, without long-term current use of insulin, unspecified laterality (HCC)      Other   (positive) Osteomyelitis of left foot (HCC)       Physical Exam    Airway   Mallampati: II  TM distance: >3 FB  Neck ROM: full       Cardiovascular - normal exam  Rhythm: regular  Rate: normal  (-) murmur  Comments: ECHO 1/24 reviewed nl EF    Dental - normal exam           Pulmonary - normal exam  Breath sounds clear to auscultation     Abdominal    Neurological - normal exam         Other findings: Pt admitted with sepsis, mutiple medical issues started dialysis yesterday. Hx of anethesia without complicaton              Anesthesia Plan    ASA 4   ASA physical status 4 criteria: ESRD not undergoing regularly scheduled dialysis and sepsis    Plan - general       Airway plan will be LMA        Plan Factors:   Patient was previously instructed to abstain from smoking on day of procedure.  Patient did not smoke on day of procedure.          Postoperative Plan: Postoperative administration of opioids is  intended.        Informed Consent:    Anesthetic plan and risks discussed with patient.    Use of blood products discussed with: patient whom consented to blood products.

## 2024-01-30 NOTE — ASSESSMENT & PLAN NOTE
"Resolved  Managing sepsis from infected foot now s/p L BKA copoleted course of antibiotics  He has chronic kidney disease and has now gone on to dialysis.  PT/OT recommends skilled. Rehab evaluating and has accepted. Patient pending acceptance, questions about affording it \"financial plan\". Will need outpatient dialysis chair as well.    Rehab TOMORROW  "

## 2024-01-30 NOTE — ASSESSMENT & PLAN NOTE
Ionized calcium is around 1  Asymptomatic for hypocalcemia  Check vitamin D level  On calcium oxalate

## 2024-01-30 NOTE — OR NURSING
Report to Roslyn RN. Plan of care discussed. Patient denies pain or nausea. On room air. VSS. Dressing CDI. Leg elevated on pillow. Patient states all needs are met at this time, verbalizes readiness to proceed to hospital room.

## 2024-01-30 NOTE — ASSESSMENT & PLAN NOTE
HbA1c 7.2.  Continue sliding scale insulin coverage while in house.  Holding long-acting insulin given now that he is hemodialysis dependent and at risk for insulin stacking/accumulation.  Accu-Cheks before meals and at bedtime. Goal to keep BG between 140-180 per 2019 ADA guidelines.  Continue diabetic diet.

## 2024-01-30 NOTE — CARE PLAN
The patient is Watcher - Medium risk of patient condition declining or worsening    Shift Goals  Clinical Goals: Surgery today  Patient Goals: surgery today  Family Goals: JENNIFER    Progress made toward(s) clinical / shift goals:  Pt is Aox4. On RA post op. L foot dressing C/D/I. Attempted to call pt wife but no answer, left a voicemail. Pt states he will call her to update her and his mother. No c/o pain. Pt to go to HD today. Pt remains free of falls. No acute distress noted.     Patient is not progressing towards the following goals:

## 2024-01-30 NOTE — PROGRESS NOTES
Patient had a critical calcium of 6.4. Hospitalist made and ordered Iodized calcium lab. Blood specimen sent to lab.

## 2024-01-30 NOTE — PROGRESS NOTES
Nephrology Daily Progress Note    Date of Service  1/30/2024    Chief Complaint  53 y.o. 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    Interval Problem Update   No overnight events.  Physical Exam  Temp:  [35.9 °C (96.7 °F)-36.5 °C (97.7 °F)] 36.4 °C (97.6 °F)  Pulse:  [66-90] 69  Resp:  [12-18] 18  BP: (109-169)/(56-78) 135/70  SpO2:  [92 %-100 %] 94 %      GEN: alert and oriented in no acute distress.  HEENT: moist oropharygneal mucous membranes  CV:RRR, normal s1 s2  PULM: clear to auscultation bilaterally  ABD: soft non tender non distended  EXT: warm well perfused, no lower extremity edema   ACCESS: R IJ TDC  Fluids    Intake/Output Summary (Last 24 hours) at 1/30/2024 1554  Last data filed at 1/29/2024 2100  Gross per 24 hour   Intake --   Output 300 ml   Net -300 ml         Laboratory  Labs reviewed, pertinent labs below.  Recent Labs     01/28/24  0545 01/29/24  0454 01/30/24  0344   WBC 17.2* 19.9* 15.8*   RBC 2.59* 2.57* 2.63*   HEMOGLOBIN 7.3* 7.6* 7.4*   HEMATOCRIT 21.9* 21.8* 22.3*   MCV 84.6 84.8 84.8   MCH 28.2 29.6 28.1   MCHC 33.3 34.9 33.2   RDW 43.3 43.8 43.0   PLATELETCT 293 285 271   MPV 9.6 9.3 9.5       Recent Labs     01/28/24  0545 01/29/24  0454 01/30/24  0344   SODIUM 128* 130* 131*   POTASSIUM 3.4* 3.5* 3.6   CHLORIDE 97 97 96   CO2 19* 20 22   GLUCOSE 254* 139* 178*   BUN 68* 57* 45*   CREATININE 6.06* 5.33* 4.13*   CALCIUM 6.8* 6.5* 6.4*                   URINALYSIS:  Lab Results   Component Value Date/Time    COLORURINE Yellow 01/09/2024 2142    CLARITY Clear 01/09/2024 2142    SPECGRAVITY 1.013 01/09/2024 2142    PHURINE 5.0 01/09/2024 2142    KETONES Negative 01/09/2024 2142    PROTEINURIN 300 (A) 01/09/2024 2142    BILIRUBINUR Negative 01/09/2024 2142    UROBILU 0.2 01/09/2024 2142    NITRITE Negative 01/09/2024 2142    LEUKESTERAS  Negative 01/09/2024 2142    OCCULTBLOOD Small (A) 01/09/2024 2142     [facility] [place]  Lab Results   Component Value Date/Time    TOTPROTUR 258.0 (H) 05/03/2023 1210      Lab Results   Component Value Date/Time    CREATININEU 27.57 05/03/2023 1210         Imaging interpreted by radiologist. Imaging reports reviewed with pertinent findings below  MR-FOOT-W/O LEFT   Final Result      1.  Marrow edema involving the fifth metatarsal head with also seen marrow edema and fragmentation of the fifth proximal phalanx and marrow edema involving the middle and distal phalanges. This most likely represents osteomyelitis with pathologic    fracture. There is surrounding cellulitis.      2.  Mild marrow edema involving the fourth metatarsal head and the fourth proximal phalanx possibly reactive in nature versus osteomyelitis.      3.  Edema/induration of the soft tissues of the forefoot, midfoot and hindfoot with also seen involvement of the intrinsic muscles of the foot likely representing cellulitis and myositis. There are punctate areas of gas within the soft tissues of the    forefoot and midfoot which may indicate areas of tissue necrosis.      4.  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      IR-SABA,GROABELINO PLACEMENT >5   Final Result      1. ULTRASOUND AND FLUOROSCOPIC GUIDED PLACEMENT OF A Right INTERNAL JUGULAR 14.5 Sinhala GLIDE PATH TUNNELED DIALYSIS CATHETER.      2. THE HEMODIALYSIS CATHETER MAY BE USED IMMEDIATELY AS CLINICALLY INDICATED. FLUSHES PER PROTOCOL.         US-FLOWER SINGLE LEVEL BILAT   Final Result      DX-CHEST-PORTABLE (1 VIEW)   Final Result      CT-FOOT W/O PLUS RECONS LEFT   Final Result      1.  Findings are consistent with osteomyelitis and pathologic fracture involving the proximal phalanx of the fifth digit.      2.  Soft tissue swelling and emphysema is present around the fifth digit and in the plantar soft tissues of the foot. These findings are consistent with cellulitis.       DX-FOOT-COMPLETE 3+ LEFT   Final Result      1.  Comminuted fracture proximal phalanx fifth digit is identified which could be pathologic and related to osteomyelitis.      2.  Soft tissue swelling and emphysema in the fifth digit which could be due to cellulitis.      DX-CHEST-PORTABLE (1 VIEW)   Final Result         No acute cardiac or pulmonary abnormality is identified.            Current Facility-Administered Medications   Medication Dose Route Frequency Provider Last Rate Last Admin    heparin injection 5,000 Units  5,000 Units Subcutaneous Q8HRS Sam Givens M.D.   5,000 Units at 01/29/24 2136    heparin injection 3,600 Units  3,600 Units Intracatheter DIALYSIS PRN Rosmery Leon M.D.   3,600 Units at 01/30/24 1547    insulin regular (HumuLIN R,NovoLIN R) injection  3-14 Units Subcutaneous 4X/DAY ACHISAAC Mcbride M.D.   4 Units at 01/29/24 2204    And    dextrose 50% (D50W) injection 25 g  25 g Intravenous Q15 MIN PRN Alejandro Mcbride M.D.        NS (Bolus) 0.9 % infusion 250 mL  250 mL Intravenous DIALYSIS PRN Varun Ortiz M.D.        epoetin (Retacrit) 5,000 Units injection (Dialysis Use Only)  5,000 Units Intravenous MO, WE + FR Varun Ortiz M.D.   5,000 Units at 01/29/24 1441    LORazepam (Ativan) injection 1 mg  1 mg Intravenous Q6HRS PRN Alejandro Mcbride M.D.   1 mg at 01/27/24 1556    Linezolid (Zyvox) premix 600 mg  600 mg Intravenous Q12HRS Tino Romano Jr., D.O.   Stopped at 01/30/24 0544    atorvastatin (Lipitor) tablet 80 mg  80 mg Oral Q EVENING Tino Romano Jr., D.O.   80 mg at 01/29/24 1717    omeprazole (PriLOSEC) capsule 20 mg  20 mg Oral QAM Tino Romano Jr., D.O.   20 mg at 01/30/24 0435    senna-docusate (Pericolace Or Senokot S) 8.6-50 MG per tablet 2 Tablet  2 Tablet Oral BID Tino Romano Jr., D.O.   2 Tablet at 01/30/24 0435    And    polyethylene glycol/lytes (Miralax) Packet 1 Packet  1 Packet Oral QDAY PRN LISA Doss Jr.O.        And    bisacodyl (Dulcolax)  suppository 10 mg  10 mg Rectal QDAY PRN Tino Romano Jr. D.O.        ondansetron (Zofran) syringe/vial injection 4 mg  4 mg Intravenous Q4HRS PRN JD Doss Jr..OAsiya        ondansetron (Zofran ODT) dispertab 4 mg  4 mg Oral Q4HRS PRN JD Doss Jr..O.        promethazine (Phenergan) tablet 12.5-25 mg  12.5-25 mg Oral Q4HRS PRN Tino Romano Jr. D.O.        promethazine (Phenergan) suppository 12.5-25 mg  12.5-25 mg Rectal Q4HRS PRN JD Doss Jr..O.        prochlorperazine (Compazine) injection 5-10 mg  5-10 mg Intravenous Q4HRS PRN JD Doss Jr..O.        acetaminophen (Tylenol) tablet 650 mg  650 mg Oral Q6HRS PRN Tino Romano Jr. D.O.   650 mg at 01/28/24 1642    LR (Bolus) infusion 500 mL  500 mL Intravenous Once PRN Tino Romano Jr. D.O.        piperacillin-tazobactam (Zosyn) 4.5 g in  mL IVPB  4.5 g Intravenous Q12HRS JD Doss Jr..O.   Stopped at 01/30/24 0835    aspirin EC tablet 81 mg  81 mg Oral DAILY Tino Romano Jr. D.O.   81 mg at 01/29/24 0520    cholestyramine (Questran) 4 GM powder 4 g  1 Packet Oral BID Tino Romano Jr. D.O.   4 g at 01/29/24 1758       Kidney biopsy 3/29/2022 interpreted by Dr. Ac  Diagnosis: Chronic glomerular microangiopathic changes.  Nodular diabetic glomerulosclerosis.  Comment: In light of the clinical history, the microangiopathic changes are likely secondary to anti-VEGF inhibitor use.  Other causes of chronic thrombotic microangiopathy are also on the differential.  There are no acute thrombi.  Light microscopy notable for approximately 50% interstitial fibrosis and tubular atrophy as well as evidence of acute tubular injury      Assessment/Plan  53 y.o. 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     1.  Progression of CKD to ESRD.  CKD due to  biopsy-proven diabetic nephropathy and chronic thrombotic microangiopathy (TMA) Await tunneled dialysis catheter placement.  Will plan to initiate hemodialysis 1/31/2024, initially deferred due to persistent leukocytosis.     2.  Hyponatremia, persistent.  Recommend 1 L fluid restriction.  Check sodium level daily.     3.  Metabolic acidosis, improving. due to advanced chronic disease.  Remains off of bicarbonate infusion.  To be addressed with hemodialysis as above.        4.  Azotemia, with high BUN and uremic symptoms.  Recommend dialysis initiation as above.  Continue to monitor with BMP.     5.  Left diabetic foot infection placated by osteomyelitis of the left metatarsal head.  Appreciate orthopedic surgery recommendations with plan for fifth ray amputation through metatarsal shaft, irrigation debridement of left plantar foot abscess on 1/30/2024      6.  Anemia of chronic kidney disease-continue Epogen with hemodialysis Monday Wednesday Friday.  Transfuse packed red blood cells to maintain hemoglobin greater than 7.    Plan of care discussed with Dr. Eldon Leon MD  Nephrology  [facility]

## 2024-01-30 NOTE — ASSESSMENT & PLAN NOTE
Patient now started on hemodialysis 1/29/2024.  Nephrology following.  Outpatient hemodialysis chair arranged.

## 2024-01-30 NOTE — CARE PLAN
The patient is Stable - Low risk of patient condition declining or worsening    Shift Goals  Clinical Goals: MRI, NPO post midnight for surgery, monitor v/s, bs. HD ktaie, rest  Patient Goals: rest/sleep well  Family Goals: not present    Progress made toward(s) clinical / shift goals:    Problem: Knowledge Deficit - Standard  Goal: Patient and family/care givers will demonstrate understanding of plan of care, disease process/condition, diagnostic tests and medications  Outcome: Progressing     Problem: Hemodynamics  Goal: Patient's hemodynamics, fluid balance and neurologic status will be stable or improve  Outcome: Progressing     Problem: Fluid Volume  Goal: Fluid volume balance will be maintained  Outcome: Progressing     Problem: Urinary - Renal Perfusion  Goal: Ability to achieve and maintain adequate renal perfusion and functioning will improve  Outcome: Progressing     Problem: Pain - Standard  Goal: Alleviation of pain or a reduction in pain to the patient’s comfort goal  Outcome: Progressing     Problem: Skin Integrity  Goal: Skin integrity is maintained or improved  Outcome: Progressing     Problem: Fall Risk  Goal: Patient will remain free from falls  Outcome: Progressing     Received RN report, assumed care for this patient  AO x 4, irritable, Denies any pain  Assisted in bed, newly admit to the floor  HD tunnel cath on  his Right IJ- Gauze/ transparent dressing in placed- shadowing note. Left IJ triple lumen, patent and flushed with saline  203 fsbg and insulin coverage given  MRI Left foot done, for MD interpretation  Reviewed plan of care, agreed and understood  NPO post midnight for possible surgery for Left foot wound  Refused bed alarm, call appropriately  Call light and personal items within reached. Kept safe and continue monitoring.      Patient is not progressing towards the following goals:

## 2024-01-30 NOTE — ASSESSMENT & PLAN NOTE
Likely component of chronic disease as well as chronic kidney disease  Hemoglobin stable at 8.1.   He has been getting erythropoietin during dialysis

## 2024-01-30 NOTE — CONSULTS
1/30/2024    The patient was seen at the request of Dr Colón    HPI: Beni Moore is a 53 y.o. male who presents with complaints of pain to left foot.  This started days ago after unknown trauma.  The pain is 6/10 and is described as sharp.  The pain is made worse by palpation of the area and made better by rest and immobilization.    Past Medical History:   Diagnosis Date    Anesthesia     Hypotension post-op, persisted for a few months    Anesthesia 12/06/2023    Patient stated after 2016 colonoscopy he became hypotensive.    Bowel habit changes     History GI problems    Cataract     IOL - Left eye    Cataract     IOL OD    Deaf, right     Diabetes (HCC)     Oral medications & insulin    Heart burn     GERD; takes Prilosec    Hemorrhagic disorder (HCC)     ASA protection for stent and cardiac history    History of non-ST elevation myocardial infarction (NSTEMI)     Per Problem List    Hx of heart artery stent 2019    Hyperlipidemia     Hyperparathyroidism due to renal insufficiency (HCC)     Per Problem List    Hypertension     Myocardial infarct (HCC)     Pneumonia     H/O    Renal disorder 12/06/2023    Stage 3 CKD       Past Surgical History:   Procedure Laterality Date    PB OPEN TREATMENT PBOX HUMERAL FRACTURE Left 12/11/2023    Procedure: LEFT OPEN REDUCTION INTERNAL FIXATION  HUMERUS, PROXIMAL AND LEFT BICEPS TENODESIS;  Surgeon: Aris Pierre M.D.;  Location: SURGERY [facility];  Service: Orthopedics    PB REPAIR BICEPS LONG TENDON Left 12/11/2023    Procedure: TENODESIS, BICEPS, OPEN;  Surgeon: Aris Pierre M.D.;  Location: SURGERY [facility];  Service: Orthopedics    CATARACT EXTRACTION WITH IOL Right 2023    TOE AMPUTATION Left 09/30/2021    Procedure: AMPUTATION, TOE;  Surgeon: Tomer Hart M.D.;  Location: SURGERY [facility];  Service: Orthopedics    STENT PLACEMENT  2019    STEMI with nonobstructive LAD    TOE AMPUTATION Right 07/02/2018    Procedure: Transmetatarsal  amputation;  Surgeon: Ravi Bernardo M.D.;  Location: SURGERY [facility];  Service: Orthopedics    ACHILLES TENDON REPAIR Right 07/02/2018    Procedure: ACHILLES LENGTHENING;  Surgeon: Ravi Bernardo M.D.;  Location: SURGERY [facility];  Service: Orthopedics    IRRIGATION & DEBRIDEMENT ORTHO Right 07/02/2018    Procedure: IRRIGATION & DEBRIDEMENT ORTHO;  Surgeon: Ravi Bernardo M.D.;  Location: SURGERY [facility];  Service: Orthopedics    OTHER Left     IOL    OTHER ABDOMINAL SURGERY      OTHER ORTHOPEDIC SURGERY      TONSILLECTOMY      VASECTOMY         Medications  No current facility-administered medications on file prior to encounter.     Current Outpatient Medications on File Prior to Encounter   Medication Sig Dispense Refill    oseltamivir (TAMIFLU) 30 MG Cap Take 30 mg by mouth every day. X 3 days      sodium bicarbonate (SODIUM BICARBONATE) 650 MG Tab Take 1 Tablet by mouth in the morning, at noon, and at bedtime. 120 Tablet 0    furosemide (LASIX) 40 MG Tab Take 0.5 Tablets by mouth 1 time a day as needed (If noticeable swelling in extremities and abdomen or gained 2lb in 24hrs.). 30 Tablet 0    enalapril (VASOTEC) 20 MG tablet TAKE ONE-HALF (1/2) TABLET TWICE A DAY 90 Tablet 3    HUMULIN R 100 UNIT/ML Solution Inject 4-8 Units under the skin 3 times a day before meals. Sliding Scale   150 - 200 = 3 units  201 - 250 = 4 units  251 - 300 = 5 units  301 - 350 = 6 units  351 - 400 = 7 units   PLUS CARB CORRECTION  of 1 unit for every 15 g carb      Ranibizumab (LUCENTIS) 0.3 MG/0.05ML Solution 1 Dose by Intravitreal route see administration instructions. Every 6-8 weeks      atorvastatin (LIPITOR) 80 MG tablet Take 1 Tablet by mouth every evening. 100 Tablet 4    Insulin Degludec (TRESIBA FLEXTOUCH) 100 UNIT/ML Solution Pen-injector Inject 36 Units under the skin every evening. 36 units at night      aspirin EC (ECOTRIN) 81 MG Tablet Delayed Response Take 1 Tablet by mouth every day. 90  Tablet 2    carvedilol (COREG) 3.125 MG Tab Take 1 Tablet by mouth 2 times a day with meals. 180 Tablet 3    cholestyramine (QUESTRAN) 4 g packet Take 1 Packet by mouth 2 times a day. 1000 + 1700      omeprazole (PRILOSEC) 20 MG delayed-release capsule Take 1 Capsule by mouth every morning.         Allergies  Patient has no known allergies.    ROS  Left foot infection. All other systems were reviewed and found to be negative    Family History   Problem Relation Age of Onset    No Known Problems Mother     Diabetes Father     Heart Disease Father     Diabetes Maternal Grandmother     Heart Disease Maternal Grandfather     Lung Disease Paternal Grandmother     Cancer Paternal Grandmother     Cancer Paternal Grandfather     Lung Cancer Paternal Grandfather     Kidney Disease Neg Hx        Social History     Socioeconomic History    Marital status:     Highest education level: Some college, no degree   Tobacco Use    Smoking status: Former     Current packs/day: 0.00     Average packs/day: 0.5 packs/day for 10.0 years (5.0 ttl pk-yrs)     Types: Cigarettes     Start date:      Quit date:      Years since quittin.0    Smokeless tobacco: Never   Vaping Use    Vaping Use: Never used   Substance and Sexual Activity    Alcohol use: No    Drug use: Not Currently     Types: Marijuana, Oral     Comment: CBD Edibles 3-4 times per week    Sexual activity: Not Currently     Partners: Female     Social Determinants of Health     Financial Resource Strain: Low Risk  (2022)    Overall Financial Resource Strain (CARDIA)     Difficulty of Paying Living Expenses: Not hard at all   Food Insecurity: No Food Insecurity (2022)    Hunger Vital Sign     Worried About Running Out of Food in the Last Year: Never true     Ran Out of Food in the Last Year: Never true   Transportation Needs: No Transportation Needs (2022)    PRAPARE - Transportation     Lack of Transportation (Medical): No     Lack of  Transportation (Non-Medical): No   Physical Activity: Insufficiently Active (12/13/2022)    Exercise Vital Sign     Days of Exercise per Week: 2 days     Minutes of Exercise per Session: 40 min   Stress: No Stress Concern Present (12/13/2022)    [employer] [place] [employer] - Occupational Stress Questionnaire     Feeling of Stress : Not at all   Social Connections: Moderately Integrated (12/13/2022)    Social Connection and Isolation Panel [NHANES]     Frequency of Communication with Friends and Family: More than three times a week     Frequency of Social Gatherings with Friends and Family: More than three times a week     Attends Gnosticism Services: Never     Active Member of Clubs or Organizations: Yes     Attends Club or Organization Meetings: Never     Marital Status:    Housing Stability: Low Risk  (12/13/2022)    Housing Stability Vital Sign     Unable to Pay for Housing in the Last Year: No     Number of Places Lived in the Last Year: 1     Unstable Housing in the Last Year: No       Physical Exam  Vitals  BP (!) 169/78   Pulse 72   Temp 36.2 °C (97.1 °F) (Temporal)   Resp 18   Ht 1.829 m (6')   Wt 99 kg (218 lb 4.1 oz)   SpO2 95%   General: Well Developed, Well Nourished, Age appropriate appearance  HEENT: Normocephalic, atraumatic  Psych: Normal mood and affect  Neck: Supple, nontender, no masses  Lungs: Breathing unlabored, No audible wheezing  Heart: Regular heart rate and rhythm  Abdomen: Soft, NT, ND  Neuro: Sensation grossly intact to BUE and BLE, moving all four extremities  Skin: Intact, no open wounds  Vascular: 1+DP/PT, Capillary refill <2 seconds  MSK: Necrotic left fifth toe with purulence      Radiographs:  Left fifth toe osteo  MR-FOOT-W/O LEFT   Final Result      1.  Marrow edema involving the fifth metatarsal head with also seen marrow edema and fragmentation of the fifth proximal phalanx and marrow edema involving the middle and distal phalanges. This most likely  represents osteomyelitis with pathologic    fracture. There is surrounding cellulitis.      2.  Mild marrow edema involving the fourth metatarsal head and the fourth proximal phalanx possibly reactive in nature versus osteomyelitis.      3.  Edema/induration of the soft tissues of the forefoot, midfoot and hindfoot with also seen involvement of the intrinsic muscles of the foot likely representing cellulitis and myositis. There are punctate areas of gas within the soft tissues of the    forefoot and midfoot which may indicate areas of tissue necrosis.      4.  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      IR-BRADLEY SABA PLACEMENT >5   Final Result      1. ULTRASOUND AND FLUOROSCOPIC GUIDED PLACEMENT OF A Right INTERNAL JUGULAR 14.5 [place] GLIDE PATH TUNNELED DIALYSIS CATHETER.      2. THE HEMODIALYSIS CATHETER MAY BE USED IMMEDIATELY AS CLINICALLY INDICATED. FLUSHES PER PROTOCOL.         US-FLOWER SINGLE LEVEL BILAT   Final Result      DX-CHEST-PORTABLE (1 VIEW)   Final Result      CT-FOOT W/O PLUS RECONS LEFT   Final Result      1.  Findings are consistent with osteomyelitis and pathologic fracture involving the proximal phalanx of the fifth digit.      2.  Soft tissue swelling and emphysema is present around the fifth digit and in the plantar soft tissues of the foot. These findings are consistent with cellulitis.      DX-FOOT-COMPLETE 3+ LEFT   Final Result      1.  Comminuted fracture proximal phalanx fifth digit is identified which could be pathologic and related to osteomyelitis.      2.  Soft tissue swelling and emphysema in the fifth digit which could be due to cellulitis.      DX-CHEST-PORTABLE (1 VIEW)   Final Result         No acute cardiac or pulmonary abnormality is identified.          Laboratory Values  Recent Labs     01/28/24  0545 01/29/24  0454 01/30/24  0344   WBC 17.2* 19.9* 15.8*   RBC 2.59* 2.57* 2.63*   HEMOGLOBIN 7.3* 7.6* 7.4*   HEMATOCRIT 21.9* 21.8* 22.3*   MCV 84.6 84.8  84.8   MCH 28.2 29.6 28.1   MCHC 33.3 34.9 33.2   RDW 43.3 43.8 43.0   PLATELETCT 293 285 271   MPV 9.6 9.3 9.5     Recent Labs     01/28/24  0545 01/29/24  0454 01/30/24  0344   SODIUM 128* 130* 131*   POTASSIUM 3.4* 3.5* 3.6   CHLORIDE 97 97 96   CO2 19* 20 22   GLUCOSE 254* 139* 178*   BUN 68* 57* 45*             Impression:Left fifth ray osteomyelitis    Plan:We discussed the diagnosis and findings with the patient at length.  We reviewed possible non operative and operative interventions and the risks and benefits of each of these.  he had a chance to ask questions and all of these were answered to his satisfaction. The patient chose to proceed with  fifth ray amputation including all indicated procedures. Risks and benefits of surgery were discussed which include but are not limited to bleeding, infection, neurovascular damage, malunion, nonunion, instability, limb length discrepancy, DVT, PE, MI, Stroke and death. They understand these risks and wish to proceed.

## 2024-01-30 NOTE — PROGRESS NOTES
Hospital Medicine Daily Progress Note    Date of Service  1/30/2024    Chief Complaint  Beni Moore is a 53 y.o. male admitted 1/25/2024 with foot infection    Hospital Course  Mr. Moore has a past medical history of chronic kidney disease baseline creatinine of 2.1 followed by nephrology, insulin-dependent diabetes mellitus, peripheral neuropathy with history of bilateral toe amputations that presented to the emergency room on 1/25/2024 with left fifth toe infection and bleeding.  Emergency room x-ray revealed pathologic fracture and was consistent with osteomyelitis with gas in the soft tissues.  His white blood cell count was 24.7 thousand.  His creatinine was quite elevated at 6.36.  He was admitted to the intensive care unit with a working diagnosis of septic shock and placed on IV Zosyn and Zyvox.  Orthopedics and nephrology was consulted.  Due to ongoing elevated creatinine, a tunneled dialysis catheter was placed.  He was initiated on dialysis on 1/29/2024.     Interval Problem Update  1/30: Mr. Moore was evaluated on the medical floor.  Calcium is 6.4 though when adjusted for albumin of 1.3 the adjusted value is 8.56 which is in the normal range. His ionized calcium is low at 1 thus one gram IV calcium ordered prior to surgery. The MRI reveals cellulitis of the 5th toe  with myositis and cellulitis. NPO. I have contacted Dr. Segura about surgery today. Mr. Moore is amenable to toe vs transmetatarsal amputation based on the recommendation of Dr. Seguar.     I have discussed this patient's plan of care and discharge plan at IDT rounds today with Case Management, Nursing, Nursing leadership, and other members of the IDT team.    Consultants/Specialty  Critical care  orthopedics    Code Status  Full Code    Disposition  The patient is not medically cleared for discharge to home or a post-acute facility.  Anticipate discharge to: skilled nursing facility    I have placed the appropriate orders for  post-discharge needs.    Review of Systems  Review of Systems   Constitutional:  Negative for chills and fever.   Respiratory:  Negative for shortness of breath.    Cardiovascular:  Negative for chest pain.   All other systems reviewed and are negative.       Physical Exam  Temp:  [35.9 °C (96.7 °F)-36.5 °C (97.7 °F)] 35.9 °C (96.7 °F)  Pulse:  [71-90] 78  Resp:  [12-22] 17  BP: (119-187)/(56-80) 136/69  SpO2:  [92 %-98 %] 92 %    Physical Exam  Vitals and nursing note reviewed.   Constitutional:       General: He is not in acute distress.     Appearance: He is ill-appearing. He is not toxic-appearing.   Neck:      Comments: Left IJ catheter  Cardiovascular:      Rate and Rhythm: Normal rate and regular rhythm.      Comments: Left chest tunneled dialysis catheter  Abdominal:      General: There is no distension.      Tenderness: There is no abdominal tenderness.   Musculoskeletal:      Comments: Left 5th toe with dry and wet gangrene and proximal cellulitis and swelling. + malodorous    Neurological:      Mental Status: He is alert and oriented to person, place, and time.   Psychiatric:         Mood and Affect: Mood normal.         Behavior: Behavior normal.         Fluids    Intake/Output Summary (Last 24 hours) at 1/30/2024 0730  Last data filed at 1/29/2024 2100  Gross per 24 hour   Intake 1062.71 ml   Output 1560 ml   Net -497.29 ml       Laboratory  Recent Labs     01/28/24  0545 01/29/24  0454 01/30/24  0344   WBC 17.2* 19.9* 15.8*   RBC 2.59* 2.57* 2.63*   HEMOGLOBIN 7.3* 7.6* 7.4*   HEMATOCRIT 21.9* 21.8* 22.3*   MCV 84.6 84.8 84.8   MCH 28.2 29.6 28.1   MCHC 33.3 34.9 33.2   RDW 43.3 43.8 43.0   PLATELETCT 293 285 271   MPV 9.6 9.3 9.5     Recent Labs     01/28/24  0545 01/29/24  0454 01/30/24  0344   SODIUM 128* 130* 131*   POTASSIUM 3.4* 3.5* 3.6   CHLORIDE 97 97 96   CO2 19* 20 22   GLUCOSE 254* 139* 178*   BUN 68* 57* 45*   CREATININE 6.06* 5.33* 4.13*   CALCIUM 6.8* 6.5* 6.4*                    Imaging  MR-FOOT-W/O LEFT   Final Result      1.  Marrow edema involving the fifth metatarsal head with also seen marrow edema and fragmentation of the fifth proximal phalanx and marrow edema involving the middle and distal phalanges. This most likely represents osteomyelitis with pathologic    fracture. There is surrounding cellulitis.      2.  Mild marrow edema involving the fourth metatarsal head and the fourth proximal phalanx possibly reactive in nature versus osteomyelitis.      3.  Edema/induration of the soft tissues of the forefoot, midfoot and hindfoot with also seen involvement of the intrinsic muscles of the foot likely representing cellulitis and myositis. There are punctate areas of gas within the soft tissues of the    forefoot and midfoot which may indicate areas of tissue necrosis.      4.  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      IR-BRADLEY SABA PLACEMENT >5   Final Result      1. ULTRASOUND AND FLUOROSCOPIC GUIDED PLACEMENT OF A Right INTERNAL JUGULAR 14.5 [place] GLIDE PATH TUNNELED DIALYSIS CATHETER.      2. THE HEMODIALYSIS CATHETER MAY BE USED IMMEDIATELY AS CLINICALLY INDICATED. FLUSHES PER PROTOCOL.         US-FLOWER SINGLE LEVEL BILAT   Final Result      DX-CHEST-PORTABLE (1 VIEW)   Final Result      CT-FOOT W/O PLUS RECONS LEFT   Final Result      1.  Findings are consistent with osteomyelitis and pathologic fracture involving the proximal phalanx of the fifth digit.      2.  Soft tissue swelling and emphysema is present around the fifth digit and in the plantar soft tissues of the foot. These findings are consistent with cellulitis.      DX-FOOT-COMPLETE 3+ LEFT   Final Result      1.  Comminuted fracture proximal phalanx fifth digit is identified which could be pathologic and related to osteomyelitis.      2.  Soft tissue swelling and emphysema in the fifth digit which could be due to cellulitis.      DX-CHEST-PORTABLE (1 VIEW)   Final Result         No acute  cardiac or pulmonary abnormality is identified.           Assessment/Plan  * Septic shock (HCC)- (present on admission)  Assessment & Plan  This is Septic shock Present on admission  SIRS criteria identified on my evaluation include: Leukocytosis, with WBC greater than 12,000  Clinical indicators of end organ dysfunction include Hypotension with systolic blood pressure less than 90 or MAP less than 65  Indicators of septic shock include: Sepsis present and persistent hypotension despite fluid resuscitation   Sources is: Foot infection  Sepsis protocol initiated  Crystalloid Fluid Administration: Was given  IV antibiotics as appropriate for source of sepsis IV Zosyn and Zyvox  Reassessment: I have reassessed the patient's hemodynamic status    Soft tissue infection of foot- (present on admission)  Assessment & Plan  Extensive soft tissue infection with osteomyelitis  MRI reveals osteomyelitis, myositis, and cellulitis R 5th toe and proximal infection.   Dr. Segura consulted for surgery on 1/30.    Hypocalcemia- (present on admission)  Assessment & Plan  On 1/30 his adjusted calcium is 8.5 though ionized calcium is low at 1  One dose of IV Calcium Chloride ordered prior to surgery.    Osteomyelitis of left foot (HCC)- (present on admission)  Assessment & Plan  Noted on CT scan    Type 2 diabetes mellitus with kidney complication, with long-term current use of insulin (HCC)- (present on admission)  Assessment & Plan  Hemoglobin A1c is quite impressive at 7.2  Sign scale insulin for now due to NPO status    Acute renal failure superimposed on stage 3b chronic kidney disease (HCC)- (present on admission)  Assessment & Plan  Nephrology was consulted and a tunneled dialysis catheter was placed and dialysis was initiated on 1/29/2024  He will need an outpatient dialysis chair by .       Normocytic anemia- (present on admission)  Assessment & Plan  Likely component of chronic disease as well as chronic  kidney disease  Hemoglobin 7.4 and will be followed intermittently as he may need transfusion  Refrain from daily phlebotomy  Erythropoietin likely to be initiated now that he is on dialysis         VTE prophylaxis:   SCDs/TEDs      I have performed a physical exam and reviewed and updated ROS and Plan today (1/30/2024). In review of yesterday's note (1/29/2024), there are no changes except as documented above.

## 2024-01-30 NOTE — DISCHARGE PLANNING
Outpatient Dialysis Placement Notification     Received notification for outpatient dialysis placement from Dr. Ortiz.      Met with patient, confirmed demographics and insurance information.  Provided patient with dialysis education materials and list of HD centers.      Referral initiated to:    [facility]  [address] [place] [id number]     Pending medical and financial clearance.     Xitlaly Reyes   Dialysis Coordinator / [facility]   Ph: [phone]

## 2024-01-30 NOTE — DISCHARGE PLANNING
Case Management Discharge Planning    Admission Date: 1/25/2024  GMLOS: 9.9  ALOS: 5    6-Clicks ADL Score: 20  6-Clicks Mobility Score: 21      Anticipated Discharge Dispo: Discharge Disposition: Discharged to home/self care (01)  Discharge Address: [address] [facility] [address]    DME Needed: No    Action(s) Taken:   8764 RN JUAN received a call from dialysis coordinator Madeleine stating that pt will need a dialysis chair, yet Quant Gold is pending. Order for Quant Gold was entered on 1/26. RN JUAN reached out to the lab to request lab to be drawn.     Escalations Completed: None    Medically Clear: No    Next Steps: Pending dialysis chair    Barriers to Discharge: Dialysis

## 2024-01-30 NOTE — ANESTHESIA POSTPROCEDURE EVALUATION
Patient: Beni Moore    Procedure Summary       Date: 01/30/24 Room / Location: [facility] 03 / SURGERY [facility]    Anesthesia Start: 1037 Anesthesia Stop: 1112    Procedure: AMPUTATION, TOE- 5TH (Left: Toe) Diagnosis: (Left fifth ray osteomyelitis)    Surgeons: Celestino Segura M.D. Responsible Provider: Sandra Fisher M.D.    Anesthesia Type: general ASA Status: 4            Final Anesthesia Type: general  Last vitals  BP   Blood Pressure: 135/70    Temp   36.4 °C (97.6 °F)    Pulse   69   Resp   18    SpO2   94 %      Anesthesia Post Evaluation    Patient location during evaluation: PACU  Patient participation: complete - patient participated  Level of consciousness: awake and alert  Pain score: 0    Airway patency: patent  Anesthetic complications: no  Cardiovascular status: hemodynamically stable  Respiratory status: acceptable  Hydration status: euvolemic    PONV: none          No notable events documented.     Nurse Pain Score: 0 (NPRS)

## 2024-01-30 NOTE — ANESTHESIA PROCEDURE NOTES
Airway    Date/Time: 1/30/2024 10:44 AM    Performed by: Sandra Fisher M.D.  Authorized by: Sandra Fisher M.D.    Location:  OR  Urgency:  Elective  Indications for Airway Management:  Anesthesia      Spontaneous Ventilation: absent    Sedation Level:  Deep  Preoxygenated: Yes    Mask Difficulty Assessment:  1 - vent by mask  Final Airway Type:  Supraglottic airway  Final Supraglottic Airway:  Standard LMA    SGA Size:  4  Number of Attempts at Approach:  1  Number of Other Approaches Attempted:  0         Negative

## 2024-01-30 NOTE — PROGRESS NOTES
4 Eyes Skin Assessment Completed by JULIANO Dawn and JULIANO Zaidi.    Head WDL  Ears WDL  Nose WDL  Mouth WDL  Neck Left IJ triple lumen, Right HD tunnel cath- Dressing in placed.  Breast/Chest WDL  Shoulder Blades WDL  Spine WDL  (R) Arm/Elbow/Hand Bruising  (L) Arm/Elbow/Hand Redness and Blanching  Abdomen WDL  Groin WDL  Scrotum/Coccyx/Buttocks Redness and Blanching  (R) Leg Scab  (L) Leg Scab, swelling  (R) Heel/Foot/Toe All toe amputation (2017)  (L) Heel/Foot/Toe Redness, Swelling, and Edema,4th and 5th toe wound. Left middle toe amputation (previous 2017)  Plantar area is white and pink colored          Devices In Places Blood Pressure Cuff and Central Line      Interventions In Place Pillows and Pressure Redistribution Mattress    Possible Skin Injury Yes    Pictures Uploaded Into Epic Yes  Wound Consult Placed Yes  RN Wound Prevention Protocol Ordered Yes

## 2024-01-31 DIAGNOSIS — E11.628 DIABETIC FOOT INFECTION (HCC): ICD-10-CM

## 2024-01-31 DIAGNOSIS — I21.4 NSTEMI (NON-ST ELEVATED MYOCARDIAL INFARCTION) (HCC): ICD-10-CM

## 2024-01-31 DIAGNOSIS — I25.2 HISTORY OF NON-ST ELEVATION MYOCARDIAL INFARCTION (NSTEMI): ICD-10-CM

## 2024-01-31 DIAGNOSIS — D64.9 NORMOCYTIC ANEMIA: ICD-10-CM

## 2024-01-31 DIAGNOSIS — I25.10 CORONARY ARTERY DISEASE INVOLVING NATIVE CORONARY ARTERY OF NATIVE HEART WITHOUT ANGINA PECTORIS: ICD-10-CM

## 2024-01-31 DIAGNOSIS — L08.9 DIABETIC FOOT INFECTION (HCC): ICD-10-CM

## 2024-01-31 LAB
ANION GAP SERPL CALC-SCNC: 9 MMOL/L (ref 7–16)
BUN SERPL-MCNC: 32 MG/DL (ref 8–22)
CALCIUM SERPL-MCNC: 6.4 MG/DL (ref 8.5–10.5)
CHLORIDE SERPL-SCNC: 97 MMOL/L (ref 96–112)
CO2 SERPL-SCNC: 24 MMOL/L (ref 20–33)
CREAT SERPL-MCNC: 2.91 MG/DL (ref 0.5–1.4)
ERYTHROCYTE [DISTWIDTH] IN BLOOD BY AUTOMATED COUNT: 44 FL (ref 35.9–50)
GFR SERPLBLD CREATININE-BSD FMLA CKD-EPI: 25 ML/MIN/1.73 M 2
GLUCOSE BLD STRIP.AUTO-MCNC: 188 MG/DL (ref 65–99)
GLUCOSE BLD STRIP.AUTO-MCNC: 194 MG/DL (ref 65–99)
GLUCOSE BLD STRIP.AUTO-MCNC: 210 MG/DL (ref 65–99)
GLUCOSE BLD STRIP.AUTO-MCNC: 219 MG/DL (ref 65–99)
GLUCOSE SERPL-MCNC: 203 MG/DL (ref 65–99)
HCT VFR BLD AUTO: 25.2 % (ref 42–52)
HGB BLD-MCNC: 8.3 G/DL (ref 14–18)
MCH RBC QN AUTO: 28.5 PG (ref 27–33)
MCHC RBC AUTO-ENTMCNC: 32.9 G/DL (ref 32.3–36.5)
MCV RBC AUTO: 86.6 FL (ref 81.4–97.8)
PLATELET # BLD AUTO: 250 K/UL (ref 164–446)
PMV BLD AUTO: 9.3 FL (ref 9–12.9)
POTASSIUM SERPL-SCNC: 3.7 MMOL/L (ref 3.6–5.5)
RBC # BLD AUTO: 2.91 M/UL (ref 4.7–6.1)
SODIUM SERPL-SCNC: 130 MMOL/L (ref 135–145)
WBC # BLD AUTO: 12.1 K/UL (ref 4.8–10.8)

## 2024-01-31 PROCEDURE — 770006 HCHG ROOM/CARE - MED/SURG/GYN SEMI*

## 2024-01-31 PROCEDURE — 36415 COLL VENOUS BLD VENIPUNCTURE: CPT

## 2024-01-31 PROCEDURE — A9270 NON-COVERED ITEM OR SERVICE: HCPCS | Performed by: INTERNAL MEDICINE

## 2024-01-31 PROCEDURE — 700111 HCHG RX REV CODE 636 W/ 250 OVERRIDE (IP): Mod: JZ,JG | Performed by: INTERNAL MEDICINE

## 2024-01-31 PROCEDURE — 700105 HCHG RX REV CODE 258: Performed by: INTERNAL MEDICINE

## 2024-01-31 PROCEDURE — 99024 POSTOP FOLLOW-UP VISIT: CPT | Performed by: ORTHOPAEDIC SURGERY

## 2024-01-31 PROCEDURE — 700102 HCHG RX REV CODE 250 W/ 637 OVERRIDE(OP): Performed by: INTERNAL MEDICINE

## 2024-01-31 PROCEDURE — 80048 BASIC METABOLIC PNL TOTAL CA: CPT

## 2024-01-31 PROCEDURE — 90935 HEMODIALYSIS ONE EVALUATION: CPT | Performed by: INTERNAL MEDICINE

## 2024-01-31 PROCEDURE — 700111 HCHG RX REV CODE 636 W/ 250 OVERRIDE (IP): Performed by: INTERNAL MEDICINE

## 2024-01-31 PROCEDURE — 85027 COMPLETE CBC AUTOMATED: CPT

## 2024-01-31 PROCEDURE — 90935 HEMODIALYSIS ONE EVALUATION: CPT

## 2024-01-31 PROCEDURE — 99233 SBSQ HOSP IP/OBS HIGH 50: CPT | Performed by: INTERNAL MEDICINE

## 2024-01-31 PROCEDURE — 82962 GLUCOSE BLOOD TEST: CPT | Mod: 91

## 2024-01-31 PROCEDURE — 700111 HCHG RX REV CODE 636 W/ 250 OVERRIDE (IP): Mod: JZ,JG

## 2024-01-31 PROCEDURE — 700111 HCHG RX REV CODE 636 W/ 250 OVERRIDE (IP): Performed by: STUDENT IN AN ORGANIZED HEALTH CARE EDUCATION/TRAINING PROGRAM

## 2024-01-31 PROCEDURE — 86480 TB TEST CELL IMMUN MEASURE: CPT

## 2024-01-31 PROCEDURE — 700111 HCHG RX REV CODE 636 W/ 250 OVERRIDE (IP): Mod: JZ | Performed by: INTERNAL MEDICINE

## 2024-01-31 RX ORDER — HEPARIN SODIUM 1000 [USP'U]/ML
INJECTION, SOLUTION INTRAVENOUS; SUBCUTANEOUS
Status: COMPLETED
Start: 2024-01-31 | End: 2024-01-31

## 2024-01-31 RX ADMIN — PIPERACILLIN AND TAZOBACTAM 4.5 G: 4; .5 INJECTION, POWDER, FOR SOLUTION INTRAVENOUS at 04:25

## 2024-01-31 RX ADMIN — CHOLESTYRAMINE 4 G: 4 POWDER, FOR SUSPENSION ORAL at 13:11

## 2024-01-31 RX ADMIN — HEPARIN SODIUM 3600 UNITS: 1000 INJECTION, SOLUTION INTRAVENOUS; SUBCUTANEOUS at 11:23

## 2024-01-31 RX ADMIN — EPOETIN ALFA-EPBX 5000 UNITS: 3000 INJECTION, SOLUTION INTRAVENOUS; SUBCUTANEOUS at 10:45

## 2024-01-31 RX ADMIN — HEPARIN SODIUM 5000 UNITS: 5000 INJECTION, SOLUTION INTRAVENOUS; SUBCUTANEOUS at 13:11

## 2024-01-31 RX ADMIN — LINEZOLID 600 MG: 600 INJECTION, SOLUTION INTRAVENOUS at 04:26

## 2024-01-31 RX ADMIN — ATORVASTATIN CALCIUM 80 MG: 40 TABLET, FILM COATED ORAL at 20:59

## 2024-01-31 RX ADMIN — CEFAZOLIN 2 G: 2 INJECTION, POWDER, FOR SOLUTION INTRAMUSCULAR; INTRAVENOUS at 17:34

## 2024-01-31 RX ADMIN — EPOETIN ALFA-EPBX 3000 UNITS: 3000 INJECTION, SOLUTION INTRAVENOUS; SUBCUTANEOUS at 10:45

## 2024-01-31 RX ADMIN — EPOETIN ALFA-EPBX 2000 UNITS: 2000 INJECTION, SOLUTION INTRAVENOUS; SUBCUTANEOUS at 10:45

## 2024-01-31 ASSESSMENT — COGNITIVE AND FUNCTIONAL STATUS - GENERAL
SUGGESTED CMS G CODE MODIFIER MOBILITY: CJ
MOVING TO AND FROM BED TO CHAIR: A LITTLE
WALKING IN HOSPITAL ROOM: A LITTLE
DRESSING REGULAR LOWER BODY CLOTHING: A LITTLE
CLIMB 3 TO 5 STEPS WITH RAILING: A LITTLE
DAILY ACTIVITIY SCORE: 21
MOBILITY SCORE: 21
HELP NEEDED FOR BATHING: A LITTLE
TOILETING: A LITTLE
SUGGESTED CMS G CODE MODIFIER DAILY ACTIVITY: CJ

## 2024-01-31 NOTE — CARE PLAN
The patient is Stable - Low risk of patient condition declining or worsening    Shift Goals  Clinical Goals: Dialysis, monitor labs  Patient Goals: Rest, food  Family Goals: NA    Progress made toward(s) clinical / shift goals:  Patient and family verbalized understanding of plan of care and education. Patient's pain was controlled with pharmacological and nonpharmacological comfort measures. Patient was free from falls and no changes in skin integrity were noticed during this shift.  Problem: Knowledge Deficit - Standard  Goal: Patient and family/care givers will demonstrate understanding of plan of care, disease process/condition, diagnostic tests and medications  Outcome: Progressing     Problem: Hemodynamics  Goal: Patient's hemodynamics, fluid balance and neurologic status will be stable or improve  Outcome: Progressing     Problem: Fluid Volume  Goal: Fluid volume balance will be maintained  Outcome: Progressing     Problem: Urinary - Renal Perfusion  Goal: Ability to achieve and maintain adequate renal perfusion and functioning will improve  Outcome: Progressing     Problem: Respiratory  Goal: Patient will achieve/maintain optimum respiratory ventilation and gas exchange  Outcome: Progressing     Problem: Physical Regulation  Goal: Diagnostic test results will improve  Outcome: Progressing  Goal: Signs and symptoms of infection will decrease  Outcome: Progressing     Problem: Pain - Standard  Goal: Alleviation of pain or a reduction in pain to the patient’s comfort goal  Outcome: Progressing     Problem: Skin Integrity  Goal: Skin integrity is maintained or improved  Outcome: Progressing     Problem: Fall Risk  Goal: Patient will remain free from falls  Outcome: Progressing       Patient is not progressing towards the following goals:

## 2024-01-31 NOTE — PROCEDURES
Diagnosis:  End-Stage Renal Disease. Patient seen and examined on hemodialysis during treatment. Patient is stable, tolerating hemodialysis. Denies chest pain and shortness of breath. Orders updated as needed. Please refer to flowsheet for full details.    Access: R TDC  UF goal: 2kg as tolerated    Plan: Continue routine hemodialysis for ongoing clearance and volume needs. Anticipated next HD 02/02/24    Rosmery Leon MD  Nephrology   [facility]

## 2024-01-31 NOTE — PROGRESS NOTES
Priyank from Lab called with critical result of calcium 6.4 at 0952. Critical lab result read back to Priyank.   Dr. Rodriguez notified of critical lab result at 0952.  Critical lab result read back by Dr. Rodriguez.

## 2024-01-31 NOTE — PROGRESS NOTES
Orthopaedic Progress Note    Interval changes:  Patient doing well post op  LLE dressings are CDI  Return to OR tomorrow  NPO after midnight    ROS - Patient denies any new issues.  Pain well controlled.    /66   Pulse 72   Temp 36 °C (96.8 °F) (Temporal)   Resp 16   Ht 1.829 m (6')   Wt 99 kg (218 lb 4.1 oz)   SpO2 95%     Patient seen and examined  No acute distress  Breathing non labored  RRR  LLE dressings CDI, no change from prior neuropathy.     Recent Labs     01/29/24  0454 01/30/24  0344 01/31/24  0830   WBC 19.9* 15.8* 12.1*   RBC 2.57* 2.63* 2.91*   HEMOGLOBIN 7.6* 7.4* 8.3*   HEMATOCRIT 21.8* 22.3* 25.2*   MCV 84.8 84.8 86.6   MCH 29.6 28.1 28.5   MCHC 34.9 33.2 32.9   RDW 43.8 43.0 44.0   PLATELETCT 285 271 250   MPV 9.3 9.5 9.3       Active Hospital Problems    Diagnosis     Hypocalcemia [E83.51]     Septic shock ([facility]) [A41.9, R65.21]     Soft tissue infection of foot [L08.9]     Unspecified fracture of left toe(s), initial encounter for open fracture [S92.912B]     Osteomyelitis and soft tissue infection of left foot ([facility]) [M86.9]     Type 2 diabetes mellitus with kidney complication, with long-term current use of insulin ([facility]) [E11.29, Z79.4]     Acute renal failure superimposed on stage 3b chronic kidney disease ([facility]) [N17.9, N18.32]     Normocytic anemia [D64.9]        Assessment/Plan:  Patient doing well post op  LLE dressings are CDI  Return to OR tomorrow  NPO after midnight  POD#1 S/P   1.  Left fifth ray amputation through metatarsal shaft    2. irrigation and debridement of left plantar foot abscess   Wt bearing status - WBAT  Wound care/Drains - Dressings to be changed every other day by nursing. Or PRN for saturation starting POD#2  Future Procedures - tomorrow   Sutures/Staples out- 14-21 days post operatively. Removal will completed by ortho mid levels only.  PT/OT-initiated  Antibiotics: zyvox 600mg IV Q12, zosyn 4.5g IV Q12  DVT Prophylaxis- TEDS/SCDs/Foot  pumps/heparin  Farrell-not needed per ortho  Case Coordination for Discharge Planning - Disposition per therapy recs.

## 2024-01-31 NOTE — DISCHARGE PLANNING
Outpatient Dialysis Note    Patient has been placed and confirmed at:    [facility]  [address] [place] [id number]    Ph: [phone]    Schedule: Mon, Wed, Fri  Time: 5:45 AM    Patient to start Fri, 2/2 at 5:15 AM.    JUAN Cummings and nephrologist Dr. Leon notified of placement confirmation.   Provided patient dialysis schedule welcome letter.     Xitlaly Reyes   Dialysis Coordinator / [facility]   Ph: [phone]

## 2024-01-31 NOTE — PROGRESS NOTES
HD # 2 x 2.5hrs started @ 1313 and comnpleted @ 1543,tx well tolerated,VSS,net UF = 1000ml.RIJ TDC dressing changed,CDI,report given to Yamilet Medina RN.

## 2024-01-31 NOTE — PROGRESS NOTES
S/p fifth ray amputation with severe plantar infection and skin loss  Will need return to OR Thursday  Recommend BKA   Will consult [place] for prosthetic counseling

## 2024-01-31 NOTE — PROGRESS NOTES
Received report and assumed care of patient at change of shift. Patient is A&Ox4, on RA, and reports no pain at this time. Patient assessment completed, bed in lowest position, and call light and personal belongings are within reach. Patient expressed no further needs at this time.

## 2024-01-31 NOTE — CARE PLAN
The patient is Stable - Low risk of patient condition declining or worsening    Shift Goals  Clinical Goals: BS monitor, BP monitor  Patient Goals: rest, comfort  Family Goals: Rest, comfort.    Progress made toward(s) clinical / shift goals:  Post-surgery done today. Left 5th toe amputated. BS was 231 mg/dL Patient had two loose BM on the bedpan.Patient is NPO restrict to strict. Patient slept well after 2300 hr.Call bell within reach.  :

## 2024-01-31 NOTE — PROGRESS NOTES
[facility] Progress Note      HD today x 3 hours per Dr. Leon.   Initiated at 0820 and ended at 1120.     Assessment:   Patient stable, alert and oriented x4. Not in distress. No complaints.     Access used: R chest catheter. CDI       Net Fluid Removed: 1,000 mL      Procedure Events:  Patient tolerated treatment. No hypotensive episodes. No complaints.       Post Access Care:   Blood returned. Flushed with NS.  CVC locked with Heparin 1000 units/ mL   Arterial port:  1.8 mL   Venous port: 1.8 mL  Clamped, capped and secured. Labeled   Dressing change: Due on 2/3/24        Report given to Primary  ZHENG Smith RN.

## 2024-01-31 NOTE — PROGRESS NOTES
Hospital Medicine Daily Progress Note    Date of Service  1/31/2024    Chief Complaint  Foot infection    Hospital Course  Beni Moore is a 53 y.o. 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  X-ray revealed pathologic fracture and was consistent with osteomyelitis with gas in the soft tissues.  WBC count was 24.7 thousand.  His creatinine was quite elevated at 6.36.  He was hypotensive.  He was placed on IV Zosyn and Zyvox, along with IV fluids.  Orthopedics and nephrology were consulted.  Septic shock has improved, and patient subsequently underwent left fifth ray amputation through metatarsal shaft with irrigation and debridement of left plantar foot abscess. Due to ongoing elevated creatinine, a tunneled dialysis catheter was placed.      Interval Problem Update  1/31/2024 - I reviewed the patient's chart. There were no significant overnight events. Remains hemodynamically stable and afebrile. Stable on RA.  He is planned to be started on hemodialysis today 1/31/2024.  Surgical cultures remain negative.  Blood cultures remain negative orthopedics recommending return to the OR on Thursday and recommending BKA.  WBC 12,100.  Hemoglobin stable at 8.3.  Sodium 130.  Calcium 6.4, with normal corrected calcium based on albumin.  Creatinine 2.91.  Potassium is normal..    > I have personally seen and examined the patient today.  He is getting hemodialysis.  Denies any chest pain or shortness of breath.  No pain on the leg.  No nausea or vomiting.    I personally reviewed all lab results mentioned above. Prior medical records from this institution and outside facilities were independently  reviewed as noted. I also personally reviewed all ER physician and consultant recommendations and plans as documented above. History was independently obtained by myself. I have discussed this patient's plan of care and discharge plan at IDT rounds today with Case Management, Nursing, Nursing leadership, and other members of the IDT team.    Consultants/Specialty  Critical care  orthopedics    Code Status  Full Code    Disposition  The patient is not medically cleared for discharge to home or a post-acute facility.      Discharge plan TBD.  Needs PT/OT evaluation after BKA.  I have placed the appropriate orders for post-discharge needs.    Review of Systems  ROS     Pertinent positives/negatives as mentioned above.     A complete review of systems was personally done by me. All other systems were negative.       Physical Exam  Temp:  [36 °C (96.8 °F)-36.4 °C (97.5 °F)] 36 °C (96.8 °F)  Pulse:  [71-76] 72  Resp:  [16-17] 16  BP: (120-138)/(63-68) 120/66  SpO2:  [92 %-96 %] 95 %    Physical Exam  Vitals and nursing note reviewed.   Constitutional:       General: He is not in acute distress.     Appearance: Normal appearance. He is ill-appearing. He is not toxic-appearing or diaphoretic.   HENT:      Head: Normocephalic and atraumatic.      Mouth/Throat:      Mouth: Mucous membranes are moist.      Pharynx: No oropharyngeal exudate.   Eyes:      General: No scleral icterus.     Extraocular Movements: Extraocular movements intact.      Conjunctiva/sclera: Conjunctivae normal.      Pupils: Pupils are equal, round, and reactive to light.   Neck:      Comments: Left IJ catheter  Cardiovascular:      Rate and Rhythm: Normal rate and regular rhythm.      Heart sounds: Normal heart sounds. No murmur heard.     No gallop.      Comments: Left chest tunneled dialysis catheter  Pulmonary:      Effort: Pulmonary effort is normal. No respiratory distress.      Breath sounds: Normal breath sounds. No stridor. No wheezing, rhonchi or  rales.   Chest:      Chest wall: No tenderness.   Abdominal:      General: Bowel sounds are normal. There is no distension.      Palpations: Abdomen is soft. There is no mass.      Tenderness: There is no abdominal tenderness. There is no guarding or rebound.   Musculoskeletal:         General: No swelling. Normal range of motion.      Cervical back: Normal range of motion and neck supple.      Right lower leg: Edema (warm to touch) present.      Comments: S/p fifth ray amputation, dressing CDI   Lymphadenopathy:      Cervical: No cervical adenopathy.   Skin:     General: Skin is warm and dry.      Coloration: Skin is not jaundiced.      Findings: No rash.   Neurological:      General: No focal deficit present.      Mental Status: He is alert and oriented to person, place, and time.      Cranial Nerves: No cranial nerve deficit.   Psychiatric:         Mood and Affect: Mood normal.         Behavior: Behavior normal.         Thought Content: Thought content normal.         Judgment: Judgment normal.         Fluids    Intake/Output Summary (Last 24 hours) at 1/31/2024 1311  Last data filed at 1/30/2024 1543  Gross per 24 hour   Intake 500 ml   Output 1500 ml   Net -1000 ml       Laboratory  Recent Labs     01/29/24  0454 01/30/24  0344 01/31/24  0830   WBC 19.9* 15.8* 12.1*   RBC 2.57* 2.63* 2.91*   HEMOGLOBIN 7.6* 7.4* 8.3*   HEMATOCRIT 21.8* 22.3* 25.2*   MCV 84.8 84.8 86.6   MCH 29.6 28.1 28.5   MCHC 34.9 33.2 32.9   RDW 43.8 43.0 44.0   PLATELETCT 285 271 250   MPV 9.3 9.5 9.3     Recent Labs     01/29/24  0454 01/30/24  0344 01/31/24  0830   SODIUM 130* 131* 130*   POTASSIUM 3.5* 3.6 3.7   CHLORIDE 97 96 97   CO2 20 22 24   GLUCOSE 139* 178* 203*   BUN 57* 45* 32*   CREATININE 5.33* 4.13* 2.91*   CALCIUM 6.5* 6.4* 6.4*                   Imaging  MR-FOOT-W/O LEFT   Final Result      1.  Marrow edema involving the fifth metatarsal head with also seen marrow edema and fragmentation of the fifth proximal phalanx and  marrow edema involving the middle and distal phalanges. This most likely represents osteomyelitis with pathologic    fracture. There is surrounding cellulitis.      2.  Mild marrow edema involving the fourth metatarsal head and the fourth proximal phalanx possibly reactive in nature versus osteomyelitis.      3.  Edema/induration of the soft tissues of the forefoot, midfoot and hindfoot with also seen involvement of the intrinsic muscles of the foot likely representing cellulitis and myositis. There are punctate areas of gas within the soft tissues of the    forefoot and midfoot which may indicate areas of tissue necrosis.      4.  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      IR-BRADLEY SABA PLACEMENT >5   Final Result      1. ULTRASOUND AND FLUOROSCOPIC GUIDED PLACEMENT OF A Right INTERNAL JUGULAR 14.5 Portuguese GLIDE PATH TUNNELED DIALYSIS CATHETER.      2. THE HEMODIALYSIS CATHETER MAY BE USED IMMEDIATELY AS CLINICALLY INDICATED. FLUSHES PER PROTOCOL.         US-FLOWER SINGLE LEVEL BILAT   Final Result      DX-CHEST-PORTABLE (1 VIEW)   Final Result      CT-FOOT W/O PLUS RECONS LEFT   Final Result      1.  Findings are consistent with osteomyelitis and pathologic fracture involving the proximal phalanx of the fifth digit.      2.  Soft tissue swelling and emphysema is present around the fifth digit and in the plantar soft tissues of the foot. These findings are consistent with cellulitis.      DX-FOOT-COMPLETE 3+ LEFT   Final Result      1.  Comminuted fracture proximal phalanx fifth digit is identified which could be pathologic and related to osteomyelitis.      2.  Soft tissue swelling and emphysema in the fifth digit which could be due to cellulitis.      DX-CHEST-PORTABLE (1 VIEW)   Final Result         No acute cardiac or pulmonary abnormality is identified.           Assessment/Plan  * Septic shock (HCC)- (present on admission)  Assessment & Plan  This is Septic shock Present on admission  SIRS  criteria identified on my evaluation include: Leukocytosis, with WBC greater than 12,000  Clinical indicators of end organ dysfunction include Hypotension with systolic blood pressure less than 90 or MAP less than 65  Indicators of septic shock include: Sepsis present and persistent hypotension despite fluid resuscitation   Sources is: Foot infection  Continue sepsis protocol.  Received IV fluids.  Continue antibiotics.  Reassessment: I have reassessed the patient's hemodynamic status    Osteomyelitis and soft tissue infection of left foot (HCC)- (present on admission)  Assessment & Plan  Noted on CT scan and MRI.  Has extensive soft tissue infection with osteomyelitis.  Orthopedics on board, s/p left fifth ray amputation through metatarsal shaft with irrigation and debridement of left plantar foot abscess.  Orthopedics recommending BKA, plan to return to the OR on Thursday, 2/1/2024.  For now, continue antibiotics with IV Zosyn and Zyvox.  Continue wound care.  Continue pain control.    Acute renal failure superimposed on stage 3b chronic kidney disease (HCC)- (present on admission)  Assessment & Plan  Patient now started on hemodialysis 1/29/2024.  Nephrology following.  He will need arrangement for outpatient hemodialysis chair.      Normocytic anemia- (present on admission)  Assessment & Plan  Likely component of chronic disease as well as chronic kidney disease  Hemoglobin stable at 8.3.   Erythropoietin likely to be initiated now that he is on dialysis  Continue to trend hemoglobin daily.  Restrictive transfusion strategy, transfuse if drops below 7.    Hypocalcemia- (present on admission)  Assessment & Plan  Corrected calcium by albumin is normal.  No further replacement necessary.    Soft tissue infection of foot- (present on admission)  Assessment & Plan  As above    Type 2 diabetes mellitus with kidney complication, with long-term current use of insulin ([facility])- (present on admission)  Assessment &  Plan  HbA1c 7.2.  Continue sliding scale insulin coverage while in house.  Holding long-acting insulin given now that he is hemodialysis dependent and at risk for insulin stacking/accumulation.  Accu-Cheks before meals and at bedtime. Goal to keep BG between 140-180 per 2019 ADA guidelines.  Continue diabetic diet.           VTE prophylaxis: heparin SQ      My total time spent caring for the patient on the day of the encounter was 51 minutes. This does not include time spent on separately billable procedures/tests.

## 2024-02-01 ENCOUNTER — ANESTHESIA (OUTPATIENT)
Dept: SURGERY | Facility: MEDICAL CENTER | Age: 54
DRG: 853 | End: 2024-02-01
Payer: MEDICARE

## 2024-02-01 ENCOUNTER — ANESTHESIA EVENT (OUTPATIENT)
Dept: SURGERY | Facility: MEDICAL CENTER | Age: 54
DRG: 853 | End: 2024-02-01
Payer: MEDICARE

## 2024-02-01 LAB
ANION GAP SERPL CALC-SCNC: 9 MMOL/L (ref 7–16)
BUN SERPL-MCNC: 27 MG/DL (ref 8–22)
CALCIUM SERPL-MCNC: 6.5 MG/DL (ref 8.5–10.5)
CHLORIDE SERPL-SCNC: 98 MMOL/L (ref 96–112)
CO2 SERPL-SCNC: 24 MMOL/L (ref 20–33)
CREAT SERPL-MCNC: 3.13 MG/DL (ref 0.5–1.4)
ERYTHROCYTE [DISTWIDTH] IN BLOOD BY AUTOMATED COUNT: 43.5 FL (ref 35.9–50)
GAMMA INTERFERON BACKGROUND BLD IA-ACNC: 0.18 IU/ML
GFR SERPLBLD CREATININE-BSD FMLA CKD-EPI: 23 ML/MIN/1.73 M 2
GLUCOSE BLD STRIP.AUTO-MCNC: 196 MG/DL (ref 65–99)
GLUCOSE BLD STRIP.AUTO-MCNC: 202 MG/DL (ref 65–99)
GLUCOSE BLD STRIP.AUTO-MCNC: 204 MG/DL (ref 65–99)
GLUCOSE SERPL-MCNC: 212 MG/DL (ref 65–99)
HCT VFR BLD AUTO: 24.7 % (ref 42–52)
HGB BLD-MCNC: 8.1 G/DL (ref 14–18)
M TB IFN-G BLD-IMP: NEGATIVE
M TB IFN-G CD4+ BCKGRND COR BLD-ACNC: -0.13 IU/ML
MCH RBC QN AUTO: 28.2 PG (ref 27–33)
MCHC RBC AUTO-ENTMCNC: 32.8 G/DL (ref 32.3–36.5)
MCV RBC AUTO: 86.1 FL (ref 81.4–97.8)
MITOGEN IGNF BCKGRD COR BLD-ACNC: 8.52 IU/ML
PATHOLOGY CONSULT NOTE: NORMAL
PLATELET # BLD AUTO: 233 K/UL (ref 164–446)
PMV BLD AUTO: 9.4 FL (ref 9–12.9)
POTASSIUM SERPL-SCNC: 3.6 MMOL/L (ref 3.6–5.5)
QFT TB2 - NIL TBQ2: -0.14 IU/ML
RBC # BLD AUTO: 2.87 M/UL (ref 4.7–6.1)
SODIUM SERPL-SCNC: 131 MMOL/L (ref 135–145)
WBC # BLD AUTO: 10.2 K/UL (ref 4.8–10.8)

## 2024-02-01 PROCEDURE — 700105 HCHG RX REV CODE 258: Performed by: ANESTHESIOLOGY

## 2024-02-01 PROCEDURE — 99232 SBSQ HOSP IP/OBS MODERATE 35: CPT | Performed by: INTERNAL MEDICINE

## 2024-02-01 PROCEDURE — 700102 HCHG RX REV CODE 250 W/ 637 OVERRIDE(OP): Performed by: INTERNAL MEDICINE

## 2024-02-01 PROCEDURE — 27880 AMPUTATION OF LOWER LEG: CPT | Mod: 58,LT | Performed by: ORTHOPAEDIC SURGERY

## 2024-02-01 PROCEDURE — 160036 HCHG PACU - EA ADDL 30 MINS PHASE I: Performed by: ORTHOPAEDIC SURGERY

## 2024-02-01 PROCEDURE — 99233 SBSQ HOSP IP/OBS HIGH 50: CPT | Performed by: INTERNAL MEDICINE

## 2024-02-01 PROCEDURE — 88312 SPECIAL STAINS GROUP 1: CPT

## 2024-02-01 PROCEDURE — 770006 HCHG ROOM/CARE - MED/SURG/GYN SEMI*

## 2024-02-01 PROCEDURE — 160002 HCHG RECOVERY MINUTES (STAT): Performed by: ORTHOPAEDIC SURGERY

## 2024-02-01 PROCEDURE — 160035 HCHG PACU - 1ST 60 MINS PHASE I: Performed by: ORTHOPAEDIC SURGERY

## 2024-02-01 PROCEDURE — 88311 DECALCIFY TISSUE: CPT

## 2024-02-01 PROCEDURE — 700101 HCHG RX REV CODE 250: Performed by: ANESTHESIOLOGY

## 2024-02-01 PROCEDURE — 700111 HCHG RX REV CODE 636 W/ 250 OVERRIDE (IP): Mod: JG | Performed by: ANESTHESIOLOGY

## 2024-02-01 PROCEDURE — 700102 HCHG RX REV CODE 250 W/ 637 OVERRIDE(OP): Performed by: ANESTHESIOLOGY

## 2024-02-01 PROCEDURE — 64445 NJX AA&/STRD SCIATIC NRV IMG: CPT | Performed by: ORTHOPAEDIC SURGERY

## 2024-02-01 PROCEDURE — 160009 HCHG ANES TIME/MIN: Performed by: ORTHOPAEDIC SURGERY

## 2024-02-01 PROCEDURE — 82962 GLUCOSE BLOOD TEST: CPT | Mod: 91

## 2024-02-01 PROCEDURE — 700111 HCHG RX REV CODE 636 W/ 250 OVERRIDE (IP): Mod: JZ | Performed by: ANESTHESIOLOGY

## 2024-02-01 PROCEDURE — 160028 HCHG SURGERY MINUTES - 1ST 30 MINS LEVEL 3: Performed by: ORTHOPAEDIC SURGERY

## 2024-02-01 PROCEDURE — A9270 NON-COVERED ITEM OR SERVICE: HCPCS | Performed by: INTERNAL MEDICINE

## 2024-02-01 PROCEDURE — 88307 TISSUE EXAM BY PATHOLOGIST: CPT

## 2024-02-01 PROCEDURE — 160039 HCHG SURGERY MINUTES - EA ADDL 1 MIN LEVEL 3: Performed by: ORTHOPAEDIC SURGERY

## 2024-02-01 PROCEDURE — 80048 BASIC METABOLIC PNL TOTAL CA: CPT

## 2024-02-01 PROCEDURE — 85027 COMPLETE CBC AUTOMATED: CPT

## 2024-02-01 PROCEDURE — 36415 COLL VENOUS BLD VENIPUNCTURE: CPT

## 2024-02-01 PROCEDURE — 64447 NJX AA&/STRD FEMORAL NRV IMG: CPT | Performed by: ORTHOPAEDIC SURGERY

## 2024-02-01 PROCEDURE — A9270 NON-COVERED ITEM OR SERVICE: HCPCS | Performed by: ANESTHESIOLOGY

## 2024-02-01 PROCEDURE — 700111 HCHG RX REV CODE 636 W/ 250 OVERRIDE (IP): Performed by: STUDENT IN AN ORGANIZED HEALTH CARE EDUCATION/TRAINING PROGRAM

## 2024-02-01 PROCEDURE — 160048 HCHG OR STATISTICAL LEVEL 1-5: Performed by: ORTHOPAEDIC SURGERY

## 2024-02-01 PROCEDURE — 0Y6J0Z1 DETACHMENT AT LEFT LOWER LEG, HIGH, OPEN APPROACH: ICD-10-PCS | Performed by: ORTHOPAEDIC SURGERY

## 2024-02-01 PROCEDURE — 700111 HCHG RX REV CODE 636 W/ 250 OVERRIDE (IP): Performed by: INTERNAL MEDICINE

## 2024-02-01 PROCEDURE — 700101 HCHG RX REV CODE 250: Performed by: ORTHOPAEDIC SURGERY

## 2024-02-01 RX ORDER — OXYCODONE HCL 5 MG/5 ML
10 SOLUTION, ORAL ORAL
Status: COMPLETED | OUTPATIENT
Start: 2024-02-01 | End: 2024-02-01

## 2024-02-01 RX ORDER — HYDROMORPHONE HYDROCHLORIDE 1 MG/ML
0.4 INJECTION, SOLUTION INTRAMUSCULAR; INTRAVENOUS; SUBCUTANEOUS
Status: DISCONTINUED | OUTPATIENT
Start: 2024-02-01 | End: 2024-02-01 | Stop reason: HOSPADM

## 2024-02-01 RX ORDER — ACETAMINOPHEN 500 MG
1000 TABLET ORAL EVERY 6 HOURS PRN
Status: DISCONTINUED | OUTPATIENT
Start: 2024-02-06 | End: 2024-02-08 | Stop reason: HOSPADM

## 2024-02-01 RX ORDER — CEFAZOLIN SODIUM 1 G/3ML
INJECTION, POWDER, FOR SOLUTION INTRAMUSCULAR; INTRAVENOUS PRN
Status: DISCONTINUED | OUTPATIENT
Start: 2024-02-01 | End: 2024-02-01 | Stop reason: SURG

## 2024-02-01 RX ORDER — HALOPERIDOL 5 MG/ML
1 INJECTION INTRAMUSCULAR
Status: DISCONTINUED | OUTPATIENT
Start: 2024-02-01 | End: 2024-02-01 | Stop reason: HOSPADM

## 2024-02-01 RX ORDER — HYDROMORPHONE HYDROCHLORIDE 1 MG/ML
0.5 INJECTION, SOLUTION INTRAMUSCULAR; INTRAVENOUS; SUBCUTANEOUS
Status: DISCONTINUED | OUTPATIENT
Start: 2024-02-01 | End: 2024-02-08 | Stop reason: HOSPADM

## 2024-02-01 RX ORDER — OXYCODONE HYDROCHLORIDE 10 MG/1
10 TABLET ORAL
Status: DISCONTINUED | OUTPATIENT
Start: 2024-02-01 | End: 2024-02-08 | Stop reason: HOSPADM

## 2024-02-01 RX ORDER — SODIUM CHLORIDE, SODIUM LACTATE, POTASSIUM CHLORIDE, CALCIUM CHLORIDE 600; 310; 30; 20 MG/100ML; MG/100ML; MG/100ML; MG/100ML
INJECTION, SOLUTION INTRAVENOUS CONTINUOUS
Status: DISCONTINUED | OUTPATIENT
Start: 2024-02-01 | End: 2024-02-01 | Stop reason: HOSPADM

## 2024-02-01 RX ORDER — ACETAMINOPHEN 500 MG
1000 TABLET ORAL EVERY 6 HOURS
Status: DISPENSED | OUTPATIENT
Start: 2024-02-01 | End: 2024-02-06

## 2024-02-01 RX ORDER — DIPHENHYDRAMINE HYDROCHLORIDE 50 MG/ML
12.5 INJECTION INTRAMUSCULAR; INTRAVENOUS
Status: DISCONTINUED | OUTPATIENT
Start: 2024-02-01 | End: 2024-02-01 | Stop reason: HOSPADM

## 2024-02-01 RX ORDER — HYDROMORPHONE HYDROCHLORIDE 1 MG/ML
0.2 INJECTION, SOLUTION INTRAMUSCULAR; INTRAVENOUS; SUBCUTANEOUS
Status: DISCONTINUED | OUTPATIENT
Start: 2024-02-01 | End: 2024-02-01 | Stop reason: HOSPADM

## 2024-02-01 RX ORDER — MAGNESIUM HYDROXIDE 1200 MG/15ML
LIQUID ORAL
Status: COMPLETED | OUTPATIENT
Start: 2024-02-01 | End: 2024-02-01

## 2024-02-01 RX ORDER — MIDAZOLAM HYDROCHLORIDE 1 MG/ML
INJECTION INTRAMUSCULAR; INTRAVENOUS PRN
Status: DISCONTINUED | OUTPATIENT
Start: 2024-02-01 | End: 2024-02-01 | Stop reason: SURG

## 2024-02-01 RX ORDER — BUPIVACAINE HYDROCHLORIDE 2.5 MG/ML
INJECTION, SOLUTION EPIDURAL; INFILTRATION; INTRACAUDAL PRN
Status: DISCONTINUED | OUTPATIENT
Start: 2024-02-01 | End: 2024-02-01 | Stop reason: SURG

## 2024-02-01 RX ORDER — HYDROMORPHONE HYDROCHLORIDE 1 MG/ML
0.1 INJECTION, SOLUTION INTRAMUSCULAR; INTRAVENOUS; SUBCUTANEOUS
Status: DISCONTINUED | OUTPATIENT
Start: 2024-02-01 | End: 2024-02-01 | Stop reason: HOSPADM

## 2024-02-01 RX ORDER — OXYCODONE HCL 5 MG/5 ML
5 SOLUTION, ORAL ORAL
Status: COMPLETED | OUTPATIENT
Start: 2024-02-01 | End: 2024-02-01

## 2024-02-01 RX ORDER — EPHEDRINE SULFATE 50 MG/ML
5 INJECTION, SOLUTION INTRAVENOUS
Status: DISCONTINUED | OUTPATIENT
Start: 2024-02-01 | End: 2024-02-01 | Stop reason: HOSPADM

## 2024-02-01 RX ORDER — ONDANSETRON 2 MG/ML
4 INJECTION INTRAMUSCULAR; INTRAVENOUS
Status: DISCONTINUED | OUTPATIENT
Start: 2024-02-01 | End: 2024-02-01 | Stop reason: HOSPADM

## 2024-02-01 RX ORDER — LIDOCAINE HYDROCHLORIDE 20 MG/ML
INJECTION, SOLUTION EPIDURAL; INFILTRATION; INTRACAUDAL; PERINEURAL PRN
Status: DISCONTINUED | OUTPATIENT
Start: 2024-02-01 | End: 2024-02-01 | Stop reason: HOSPADM

## 2024-02-01 RX ORDER — ONDANSETRON 2 MG/ML
INJECTION INTRAMUSCULAR; INTRAVENOUS PRN
Status: DISCONTINUED | OUTPATIENT
Start: 2024-02-01 | End: 2024-02-01 | Stop reason: SURG

## 2024-02-01 RX ORDER — LABETALOL HYDROCHLORIDE 5 MG/ML
5 INJECTION, SOLUTION INTRAVENOUS
Status: DISCONTINUED | OUTPATIENT
Start: 2024-02-01 | End: 2024-02-01 | Stop reason: HOSPADM

## 2024-02-01 RX ORDER — MEPERIDINE HYDROCHLORIDE 25 MG/ML
6.25 INJECTION INTRAMUSCULAR; INTRAVENOUS; SUBCUTANEOUS
Status: DISCONTINUED | OUTPATIENT
Start: 2024-02-01 | End: 2024-02-01 | Stop reason: HOSPADM

## 2024-02-01 RX ORDER — HYDRALAZINE HYDROCHLORIDE 20 MG/ML
5 INJECTION INTRAMUSCULAR; INTRAVENOUS
Status: DISCONTINUED | OUTPATIENT
Start: 2024-02-01 | End: 2024-02-01 | Stop reason: HOSPADM

## 2024-02-01 RX ORDER — SODIUM CHLORIDE 9 MG/ML
INJECTION, SOLUTION INTRAVENOUS
Status: DISCONTINUED | OUTPATIENT
Start: 2024-02-01 | End: 2024-02-01 | Stop reason: HOSPADM

## 2024-02-01 RX ORDER — OXYCODONE HYDROCHLORIDE 5 MG/1
5 TABLET ORAL
Status: DISCONTINUED | OUTPATIENT
Start: 2024-02-01 | End: 2024-02-08 | Stop reason: HOSPADM

## 2024-02-01 RX ADMIN — BUPIVACAINE HYDROCHLORIDE 20 ML: 2.5 INJECTION, SOLUTION EPIDURAL; INFILTRATION; INTRACAUDAL at 13:50

## 2024-02-01 RX ADMIN — BUPIVACAINE HYDROCHLORIDE 20 ML: 2.5 INJECTION, SOLUTION EPIDURAL; INFILTRATION; INTRACAUDAL at 13:47

## 2024-02-01 RX ADMIN — OXYCODONE HYDROCHLORIDE 10 MG: 5 SOLUTION ORAL at 15:28

## 2024-02-01 RX ADMIN — ATORVASTATIN CALCIUM 80 MG: 40 TABLET, FILM COATED ORAL at 20:23

## 2024-02-01 RX ADMIN — LIDOCAINE HYDROCHLORIDE 50 MG: 20 INJECTION, SOLUTION EPIDURAL; INFILTRATION; INTRACAUDAL at 13:54

## 2024-02-01 RX ADMIN — CHOLESTYRAMINE 4 G: 4 POWDER, FOR SUSPENSION ORAL at 06:23

## 2024-02-01 RX ADMIN — HEPARIN SODIUM 5000 UNITS: 5000 INJECTION, SOLUTION INTRAVENOUS; SUBCUTANEOUS at 06:24

## 2024-02-01 RX ADMIN — MIDAZOLAM HYDROCHLORIDE 2 MG: 1 INJECTION, SOLUTION INTRAMUSCULAR; INTRAVENOUS at 13:45

## 2024-02-01 RX ADMIN — HYDROMORPHONE HYDROCHLORIDE 0.5 MG: 1 INJECTION, SOLUTION INTRAMUSCULAR; INTRAVENOUS; SUBCUTANEOUS at 20:23

## 2024-02-01 RX ADMIN — OMEPRAZOLE 20 MG: 20 CAPSULE, DELAYED RELEASE ORAL at 06:24

## 2024-02-01 RX ADMIN — CEFAZOLIN 2 G: 1 INJECTION, POWDER, FOR SOLUTION INTRAMUSCULAR; INTRAVENOUS at 13:56

## 2024-02-01 RX ADMIN — PROPOFOL 200 MG: 10 INJECTION, EMULSION INTRAVENOUS at 13:54

## 2024-02-01 RX ADMIN — ONDANSETRON 4 MG: 2 INJECTION INTRAMUSCULAR; INTRAVENOUS at 13:54

## 2024-02-01 RX ADMIN — HEPARIN SODIUM 5000 UNITS: 5000 INJECTION, SOLUTION INTRAVENOUS; SUBCUTANEOUS at 20:23

## 2024-02-01 RX ADMIN — CHOLESTYRAMINE 4 G: 4 POWDER, FOR SUSPENSION ORAL at 17:36

## 2024-02-01 RX ADMIN — OXYCODONE 5 MG: 5 TABLET ORAL at 19:20

## 2024-02-01 RX ADMIN — ASPIRIN 81 MG: 81 TABLET, COATED ORAL at 06:23

## 2024-02-01 RX ADMIN — SODIUM CHLORIDE: 9 INJECTION, SOLUTION INTRAVENOUS at 13:41

## 2024-02-01 RX ADMIN — ACETAMINOPHEN 1000 MG: 500 TABLET ORAL at 17:36

## 2024-02-01 RX ADMIN — FENTANYL CITRATE 50 MCG: 50 INJECTION, SOLUTION INTRAMUSCULAR; INTRAVENOUS at 15:29

## 2024-02-01 RX ADMIN — FENTANYL CITRATE 50 MCG: 50 INJECTION, SOLUTION INTRAMUSCULAR; INTRAVENOUS at 14:05

## 2024-02-01 ASSESSMENT — COGNITIVE AND FUNCTIONAL STATUS - GENERAL
MOBILITY SCORE: 16
WALKING IN HOSPITAL ROOM: A LOT
TOILETING: A LITTLE
DAILY ACTIVITIY SCORE: 21
CLIMB 3 TO 5 STEPS WITH RAILING: A LOT
STANDING UP FROM CHAIR USING ARMS: A LOT
DRESSING REGULAR LOWER BODY CLOTHING: A LITTLE
SUGGESTED CMS G CODE MODIFIER MOBILITY: CK
MOVING TO AND FROM BED TO CHAIR: A LITTLE
MOVING FROM LYING ON BACK TO SITTING ON SIDE OF FLAT BED: A LITTLE
HELP NEEDED FOR BATHING: A LITTLE
SUGGESTED CMS G CODE MODIFIER DAILY ACTIVITY: CJ

## 2024-02-01 ASSESSMENT — PAIN SCALES - GENERAL: PAIN_LEVEL: 0

## 2024-02-01 NOTE — DISCHARGE PLANNING
Case Management Discharge Planning    Admission Date: 1/25/2024  GMLOS: 9.9  ALOS: 6    6-Clicks ADL Score: 21  6-Clicks Mobility Score: 21      Anticipated Discharge Dispo: Discharge Disposition: Discharged to home/self care (01)  Discharge Address: [address] [facility] [address]    DME Needed: No    Action(s) Taken:   1130 Dialysis chair set up for [employer] [facility] Mon, Wed, Frid at 0545 am. Pt to start on Friday 2/2 at 0515. Pending medical clearance. Pt might benefit from HH services. Pending PT/OT notes    Escalations Completed: None    Medically Clear: No    Next Steps: RNCM to continue to follow up with pt and medical team to address dc needs and barriers    Barriers to Discharge: Medical clearance

## 2024-02-01 NOTE — PROGRESS NOTES
Verónica from Lab called with critical result of calcium of 6.5 at 0415. Critical lab result read back to Moriah.   Susannah Martinez notified of critical lab result at calcium of 6.5.  Critical lab result read back by Susannah Martinez.

## 2024-02-01 NOTE — ANESTHESIA POSTPROCEDURE EVALUATION
Patient: Beni Moore    Procedure Summary       Date: 02/01/24 Room / Location: [place] [address] / SURGERY [facility]    Anesthesia Start: 1341 Anesthesia Stop: 1434    Procedure: AMPUTATION, BELOW KNEE (Left: Knee) Diagnosis: (LEFT FIFTH RAY OSTEOMYELITIS)    Surgeons: Celestino Segura M.D. Responsible Provider: Oscar Barrientos M.D.    Anesthesia Type: general ASA Status: 3 - Emergent            Final Anesthesia Type: general  Last vitals  BP   Blood Pressure: (!) 155/78    Temp   36.6 °C (97.8 °F)    Pulse   78   Resp   16    SpO2   99 %      Anesthesia Post Evaluation    Patient location during evaluation: PACU  Patient participation: complete - patient participated  Level of consciousness: awake and alert  Pain score: 0    Airway patency: patent  Anesthetic complications: no  Cardiovascular status: hemodynamically stable  Respiratory status: acceptable  Hydration status: euvolemic    PONV: none          No notable events documented.     Nurse Pain Score: 0 (NPRS)

## 2024-02-01 NOTE — PROGRESS NOTES
Assumed care of pt  at 1900. Report received from Day RN. Pt is A&O x 4. Patient stated that their pain is 0/10. Pt's pain comfort goal is 0/10. Pt educated on how to use the call light, pt verbalized understanding. Bed in lowest locked position, call light within reach, hourly rounding in place. Labs reviewed, orders reviewed, communication board updated. Pt declines any further needs at this time.

## 2024-02-01 NOTE — OR NURSING
PACU note- Respirations easy.  Ace wrap clean and dry to left leg.  Leg elevated on pillow.  Medicated for pain with good effect.  Denies nausea.  Report to floor.

## 2024-02-01 NOTE — PROGRESS NOTES
Hospital Medicine Daily Progress Note    Date of Service  2/1/2024    Chief Complaint  Foot infection    Hospital Course  Beni Moore is a 53 y.o. 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  X-ray revealed pathologic fracture and was consistent with osteomyelitis with gas in the soft tissues.  WBC count was 24.7 thousand.  His creatinine was quite elevated at 6.36.  He was hypotensive.  He was placed on IV Zosyn and Zyvox, along with IV fluids.  Orthopedics and nephrology were consulted.  Septic shock has improved, and patient subsequently underwent left fifth ray amputation through metatarsal shaft with irrigation and debridement of left plantar foot abscess. Due to ongoing elevated creatinine, a tunneled dialysis catheter was placed and patient started on hemodialysis on 1/31/2024.  Surgical cultures remain negative.  Blood cultures remain negative.  Orthopedics recommended return to the OR for BKA.    Interval Problem Update  2/1/2024 - I reviewed the patient's chart today. Uneventful night. VSS. Afebrile. Saturating well on RA.  He is planned for BKA today.  No leukocytosis.  Hemoglobin stable at 8.1.  Platelet count is normal.  Creatinine 3.13.  Sodium 131.  Calcium 6.5 with low albumin (calculated corrected calcium is normal).    > I have personally seen and examined the patient today.  He is comfortable.  Denies any pain.  Having diarrhea, but per him this not unusual.  No nausea, vomiting, chest pain or shortness of breath.    I personally reviewed all lab results mentioned above. Prior medical records from this institution and outside facilities were independently reviewed as  noted. I also personally reviewed all ER physician and consultant recommendations and plans as documented above. History was independently obtained by myself. I have discussed this patient's plan of care and discharge plan at IDT rounds today with Case Management, Nursing, Nursing leadership, and other members of the IDT team.    Consultants/Specialty  Critical care  orthopedics    Code Status  Full Code    Disposition  The patient is not medically cleared for discharge to home or a post-acute facility.      Discharge plan TBD.  Outpatient hemodialysis chair arranged.  Needs PT/OT evaluation after BKA.  I have placed the appropriate orders for post-discharge needs.    Review of Systems  ROS     Pertinent positives/negatives as mentioned above.     A complete review of systems was personally done by me. All other systems were negative.       Physical Exam  Temp:  [36.3 °C (97.4 °F)-36.7 °C (98 °F)] 36.3 °C (97.4 °F)  Pulse:  [71-78] 77  Resp:  [18] 18  BP: (133-152)/(63-76) 138/76  SpO2:  [95 %-97 %] 97 %    Physical Exam  Vitals and nursing note reviewed.   Constitutional:       General: He is not in acute distress.     Appearance: Normal appearance. He is ill-appearing. He is not toxic-appearing or diaphoretic.   HENT:      Head: Normocephalic and atraumatic.      Mouth/Throat:      Mouth: Mucous membranes are moist.      Pharynx: No oropharyngeal exudate.   Eyes:      General: No scleral icterus.     Extraocular Movements: Extraocular movements intact.      Conjunctiva/sclera: Conjunctivae normal.      Pupils: Pupils are equal, round, and reactive to light.   Neck:      Comments: Left IJ catheter  Cardiovascular:      Rate and Rhythm: Normal rate and regular rhythm.      Heart sounds: Normal heart sounds. No murmur heard.     No gallop.      Comments: Left chest tunneled dialysis catheter  Pulmonary:      Effort: Pulmonary effort is normal. No respiratory distress.      Breath sounds: Normal breath sounds. No  stridor. No wheezing, rhonchi or rales.   Chest:      Chest wall: No tenderness.   Abdominal:      General: Bowel sounds are normal. There is no distension.      Palpations: Abdomen is soft. There is no mass.      Tenderness: There is no abdominal tenderness. There is no guarding or rebound.   Musculoskeletal:         General: No swelling. Normal range of motion.      Cervical back: Normal range of motion and neck supple.      Right lower leg: Edema (warm to touch) present.      Comments: S/p fifth ray amputation, dressing CDI   Lymphadenopathy:      Cervical: No cervical adenopathy.   Skin:     General: Skin is warm and dry.      Coloration: Skin is not jaundiced.      Findings: No rash.   Neurological:      General: No focal deficit present.      Mental Status: He is alert and oriented to person, place, and time.      Cranial Nerves: No cranial nerve deficit.   Psychiatric:         Mood and Affect: Mood normal.         Behavior: Behavior normal.         Thought Content: Thought content normal.         Judgment: Judgment normal.       I have performed the physical examination today 2/1/2024.  In review of yesterday's note, there are no new changes except as documented above.      Fluids    Intake/Output Summary (Last 24 hours) at 2/1/2024 1100  Last data filed at 2/1/2024 0600  Gross per 24 hour   Intake 500 ml   Output 1850 ml   Net -1350 ml       Laboratory  Recent Labs     01/30/24  0344 01/31/24  0830 02/01/24 0227   WBC 15.8* 12.1* 10.2   RBC 2.63* 2.91* 2.87*   HEMOGLOBIN 7.4* 8.3* 8.1*   HEMATOCRIT 22.3* 25.2* 24.7*   MCV 84.8 86.6 86.1   MCH 28.1 28.5 28.2   MCHC 33.2 32.9 32.8   RDW 43.0 44.0 43.5   PLATELETCT 271 250 233   MPV 9.5 9.3 9.4     Recent Labs     01/30/24  0344 01/31/24  0830 02/01/24 0227   SODIUM 131* 130* 131*   POTASSIUM 3.6 3.7 3.6   CHLORIDE 96 97 98   CO2 22 24 24   GLUCOSE 178* 203* 212*   BUN 45* 32* 27*   CREATININE 4.13* 2.91* 3.13*   CALCIUM 6.4* 6.4* 6.5*                    Imaging  MR-FOOT-W/O LEFT   Final Result      1.  Marrow edema involving the fifth metatarsal head with also seen marrow edema and fragmentation of the fifth proximal phalanx and marrow edema involving the middle and distal phalanges. This most likely represents osteomyelitis with pathologic    fracture. There is surrounding cellulitis.      2.  Mild marrow edema involving the fourth metatarsal head and the fourth proximal phalanx possibly reactive in nature versus osteomyelitis.      3.  Edema/induration of the soft tissues of the forefoot, midfoot and hindfoot with also seen involvement of the intrinsic muscles of the foot likely representing cellulitis and myositis. There are punctate areas of gas within the soft tissues of the    forefoot and midfoot which may indicate areas of tissue necrosis.      4.  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      IR-BRADLEY SABA PLACEMENT >5   Final Result      1. ULTRASOUND AND FLUOROSCOPIC GUIDED PLACEMENT OF A Right INTERNAL JUGULAR 14.5 Kinyarwanda GLIDE PATH TUNNELED DIALYSIS CATHETER.      2. THE HEMODIALYSIS CATHETER MAY BE USED IMMEDIATELY AS CLINICALLY INDICATED. FLUSHES PER PROTOCOL.         US-FLOWER SINGLE LEVEL BILAT   Final Result      DX-CHEST-PORTABLE (1 VIEW)   Final Result      CT-FOOT W/O PLUS RECONS LEFT   Final Result      1.  Findings are consistent with osteomyelitis and pathologic fracture involving the proximal phalanx of the fifth digit.      2.  Soft tissue swelling and emphysema is present around the fifth digit and in the plantar soft tissues of the foot. These findings are consistent with cellulitis.      DX-FOOT-COMPLETE 3+ LEFT   Final Result      1.  Comminuted fracture proximal phalanx fifth digit is identified which could be pathologic and related to osteomyelitis.      2.  Soft tissue swelling and emphysema in the fifth digit which could be due to cellulitis.      DX-CHEST-PORTABLE (1 VIEW)   Final Result         No acute  cardiac or pulmonary abnormality is identified.           Assessment/Plan  * Septic shock (HCC)- (present on admission)  Assessment & Plan  This is Septic shock Present on admission  SIRS criteria identified on my evaluation include: Leukocytosis, with WBC greater than 12,000  Clinical indicators of end organ dysfunction include Hypotension with systolic blood pressure less than 90 or MAP less than 65  Indicators of septic shock include: Sepsis present and persistent hypotension despite fluid resuscitation   Sources is: Foot infection  Continue sepsis protocol.  Received IV fluids.  Continue antibiotics.  Reassessment: I have reassessed the patient's hemodynamic status    Osteomyelitis and soft tissue infection of left foot (HCC)- (present on admission)  Assessment & Plan  Noted on CT scan and MRI.  Has extensive soft tissue infection with osteomyelitis.  Orthopedics on board, s/p left fifth ray amputation through metatarsal shaft with irrigation and debridement of left plantar foot abscess.  Orthopedics planning for BKA today 2/1/2024.  Completed antibiotics.  Post BKA, will have achieved adequate source control and has no further antibiotic needs.  Continue pain control.    Acute renal failure superimposed on stage 3b chronic kidney disease (HCC)- (present on admission)  Assessment & Plan  Patient now started on hemodialysis 1/29/2024.  Nephrology following.  Outpatient hemodialysis chair arranged.      Normocytic anemia- (present on admission)  Assessment & Plan  Likely component of chronic disease as well as chronic kidney disease  Hemoglobin stable at 8.1.   Erythropoietin likely to be initiated now that he is on dialysis  Continue to trend hemoglobin daily.  Restrictive transfusion strategy, transfuse if drops below 7.    Hypocalcemia- (present on admission)  Assessment & Plan  Corrected calcium by albumin is normal.  No further replacement necessary.    Soft tissue infection of foot- (present on  admission)  Assessment & Plan  As above    Type 2 diabetes mellitus with kidney complication, with long-term current use of insulin ([facility])- (present on admission)  Assessment & Plan  HbA1c 7.2.  Continue sliding scale insulin coverage while in house.  Holding long-acting insulin given now that he is hemodialysis dependent and at risk for insulin stacking/accumulation.  Accu-Cheks before meals and at bedtime. Goal to keep BG between 140-180 per 2019 ADA guidelines.  Continue diabetic diet.           VTE prophylaxis: heparin SQ      My total time spent caring for the patient on the day of the encounter was 50 minutes. This does not include time spent on separately billable procedures/tests.

## 2024-02-01 NOTE — ANESTHESIA PREPROCEDURE EVALUATION
Case: [id number] Date/Time: 02/01/24 1346    Procedure: AMPUTATION, BELOW KNEE (Left)    Location: [facility] OR 16 / SURGERY [facility]    Surgeons: Celestino Segura M.D.            Relevant Problems   CARDIAC   (positive) Coronary artery disease involving native coronary artery of native heart without angina pectoris   (positive) Hypertension   (positive) NSTEMI (non-ST elevated myocardial infarction) (HCC)   (positive) Presence of stent in coronary artery         (positive) Acute renal failure superimposed on stage 3b chronic kidney disease (HCC)   (positive) Hypertensive heart and chronic kidney disease with heart failure and stage 1 through stage 4 chronic kidney disease, or unspecified chronic kidney disease (HCC)      ENDO   (positive) Type 2 diabetes mellitus with kidney complication, with long-term current use of insulin (HCC)   (positive) Type 2 diabetes mellitus with proliferative retinopathy without macular edema, without long-term current use of insulin, unspecified laterality (HCC)      Other   (positive) Osteomyelitis and soft tissue infection of left foot (HCC)   (positive) Septic shock (HCC)   (positive) Soft tissue infection of foot       Physical Exam    Airway   Mallampati: II  TM distance: >3 FB  Neck ROM: full       Cardiovascular - normal exam  Rhythm: regular  Rate: normal  (-) murmur     Dental - normal exam           Pulmonary - normal exam  Breath sounds clear to auscultation     Abdominal    Neurological - normal exam                   Anesthesia Plan    ASA 3- EMERGENT   ASA physical status 3 criteria: diabetes - poorly controlledASA physical status emergent criteria: sepsis and acute deteriorating condition due to infection    Plan - general               Induction: intravenous    Postoperative Plan: Postoperative administration of opioids is intended.    Pertinent diagnostic labs and testing reviewed    Informed Consent:    Anesthetic plan and risks discussed with patient.    Use of  blood products discussed with: patient whom consented to blood products.

## 2024-02-01 NOTE — ANESTHESIA PROCEDURE NOTES
Peripheral Block    Date/Time: 2/1/2024 1:45 PM    Performed by: Oscar Barrientos M.D.  Authorized by: Oscar Barrientos M.D.    Patient Location:  OR  Start Time:  2/1/2024 1:45 PM  End Time:  2/1/2024 1:51 PM  Reason for Block: at surgeon's request and post-op pain management ONLY    patient identified, IV checked, site marked, risks and benefits discussed, surgical consent, monitors and equipment checked, pre-op evaluation and timeout performed    Patient Position:  Supine  Prep: ChloraPrep    Monitoring:  Heart rate, continuous pulse ox and cardiac monitor  Block Region:  Lower Extremity  Lower Extremity - Block Type:  Selective FEMORAL nerve block at the Adductor Canal and SCIATIC nerve block, lateral approach    Laterality:  Left  Procedures: ultrasound guided  Image captured, interpreted and electronically stored.  Local Infiltration:  Lidocaine  Strength:  1 %  Dose:  3 ml  Block Type:  Single-shot  Needle Length:  100mm  Needle Gauge:  21 G  Needle Localization:  Ultrasound guidance  Ultrasound picture in chart  Injection Assessment:  Negative aspiration for heme, no paresthesia on injection, incremental injection and local visualized surrounding nerve on ultrasound   1.  US Guided Sciatic Nerve Block  1150-2692  US probe placed several cm proximal to popliteal crease on posterior thigh and scanned caudad and cephalad until Sciatic Nerve (SN) identified superficial/lateral to popliteal artery.  Needle inserted lateral to probe in an in plane approach under direct visualization to a perineural position.  After negative aspiration LA injected with ease and visualized surrounding the SN.     2.  US Guided Selective Femoral Nerve Block at Adductor Canal: 4029-9922  US probe placed at mid-thigh level on externally rotated leg and femur identified.  Probe directed medially until Sartorius Muscle (SM), Femoral Artery (FA) and Saphenous Nerve (SN) identified in Adductor Canal (AC).  Needle inserted anterolateral  to probe in an in plane approach into a subsartorial perivascular perineural position.  After negative aspiration LA injected with ease and visualized spreading within the AC.

## 2024-02-01 NOTE — ANESTHESIA PROCEDURE NOTES
Airway    Date/Time: 2/1/2024 1:55 PM    Performed by: Oscar Barrientos M.D.  Authorized by: Oscar Barrientos M.D.    Location:  OR  Urgency:  Elective  Indications for Airway Management:  Anesthesia      Spontaneous Ventilation: absent    Sedation Level:  Deep  Preoxygenated: Yes    Final Airway Type:  Supraglottic airway  Final Supraglottic Airway:  Standard LMA    SGA Size:  4  Number of Attempts at Approach:  1

## 2024-02-01 NOTE — CARE PLAN
The patient is Stable - Low risk of patient condition declining or worsening    Shift Goals  Clinical Goals: BKA today  Patient Goals: Get good sleep before surgery  Family Goals: NA    Progress made toward(s) clinical / shift goals:  Patient and family verbalized understanding of plan of care and education. Patient's pain was controlled with pharmacological and nonpharmacological comfort measures. Patient was free from falls and no changes in skin integrity were noticed during this shift.  Problem: Knowledge Deficit - Standard  Goal: Patient and family/care givers will demonstrate understanding of plan of care, disease process/condition, diagnostic tests and medications  Outcome: Progressing     Problem: Hemodynamics  Goal: Patient's hemodynamics, fluid balance and neurologic status will be stable or improve  Outcome: Progressing     Problem: Fluid Volume  Goal: Fluid volume balance will be maintained  Outcome: Progressing     Problem: Urinary - Renal Perfusion  Goal: Ability to achieve and maintain adequate renal perfusion and functioning will improve  Outcome: Progressing     Problem: Respiratory  Goal: Patient will achieve/maintain optimum respiratory ventilation and gas exchange  Outcome: Progressing     Problem: Physical Regulation  Goal: Diagnostic test results will improve  Outcome: Progressing  Goal: Signs and symptoms of infection will decrease  Outcome: Progressing     Problem: Pain - Standard  Goal: Alleviation of pain or a reduction in pain to the patient’s comfort goal  Outcome: Progressing     Problem: Skin Integrity  Goal: Skin integrity is maintained or improved  Outcome: Progressing     Problem: Fall Risk  Goal: Patient will remain free from falls  Outcome: Progressing       Patient is not progressing towards the following goals:

## 2024-02-01 NOTE — CARE PLAN
The patient is Stable - Low risk of patient condition declining or worsening    Shift Goals  Clinical Goals: Pt will obtain 8 hrs of uninterrupted sleep.  Patient Goals: Rest  Family Goals: JENNIFER    Progress made toward(s) clinical / shift goals:      Problem: Knowledge Deficit - Standard  Goal: Patient and family/care givers will demonstrate understanding of plan of care, disease process/condition, diagnostic tests and medications  Outcome: Progressing     Problem: Hemodynamics  Goal: Patient's hemodynamics, fluid balance and neurologic status will be stable or improve  Outcome: Progressing       Patient is not progressing towards the following goals:

## 2024-02-01 NOTE — ANESTHESIA TIME REPORT
Anesthesia Start and Stop Event Times       Date Time Event    2/1/2024 1325 Ready for Procedure     1341 Anesthesia Start     1434 Anesthesia Stop          Responsible Staff  02/01/24      Name Role Begin Martinez Barrientos M.D. Anesth 1341 1434          Overtime Reason:  no overtime (within assigned shift)    Comments:

## 2024-02-02 LAB
ANION GAP SERPL CALC-SCNC: 8 MMOL/L (ref 7–16)
BACTERIA SPEC ANAEROBE CULT: ABNORMAL
BUN SERPL-MCNC: 32 MG/DL (ref 8–22)
CALCIUM SERPL-MCNC: 6.5 MG/DL (ref 8.5–10.5)
CHLORIDE SERPL-SCNC: 97 MMOL/L (ref 96–112)
CO2 SERPL-SCNC: 23 MMOL/L (ref 20–33)
CREAT SERPL-MCNC: 3.35 MG/DL (ref 0.5–1.4)
ERYTHROCYTE [DISTWIDTH] IN BLOOD BY AUTOMATED COUNT: 44.2 FL (ref 35.9–50)
GFR SERPLBLD CREATININE-BSD FMLA CKD-EPI: 21 ML/MIN/1.73 M 2
GLUCOSE BLD STRIP.AUTO-MCNC: 157 MG/DL (ref 65–99)
GLUCOSE BLD STRIP.AUTO-MCNC: 162 MG/DL (ref 65–99)
GLUCOSE BLD STRIP.AUTO-MCNC: 189 MG/DL (ref 65–99)
GLUCOSE BLD STRIP.AUTO-MCNC: 208 MG/DL (ref 65–99)
GLUCOSE BLD STRIP.AUTO-MCNC: 211 MG/DL (ref 65–99)
GLUCOSE SERPL-MCNC: 231 MG/DL (ref 65–99)
HCT VFR BLD AUTO: 25.6 % (ref 42–52)
HGB BLD-MCNC: 8.2 G/DL (ref 14–18)
MAGNESIUM SERPL-MCNC: 1.8 MG/DL (ref 1.5–2.5)
MCH RBC QN AUTO: 28.3 PG (ref 27–33)
MCHC RBC AUTO-ENTMCNC: 32 G/DL (ref 32.3–36.5)
MCV RBC AUTO: 88.3 FL (ref 81.4–97.8)
PLATELET # BLD AUTO: 188 K/UL (ref 164–446)
PMV BLD AUTO: 9.4 FL (ref 9–12.9)
POTASSIUM SERPL-SCNC: 3.5 MMOL/L (ref 3.6–5.5)
RBC # BLD AUTO: 2.9 M/UL (ref 4.7–6.1)
SIGNIFICANT IND 70042: ABNORMAL
SIGNIFICANT IND 70042: ABNORMAL
SITE SITE: ABNORMAL
SITE SITE: ABNORMAL
SODIUM SERPL-SCNC: 128 MMOL/L (ref 135–145)
SOURCE SOURCE: ABNORMAL
SOURCE SOURCE: ABNORMAL
WBC # BLD AUTO: 11.2 K/UL (ref 4.8–10.8)

## 2024-02-02 PROCEDURE — 99024 POSTOP FOLLOW-UP VISIT: CPT | Performed by: ORTHOPAEDIC SURGERY

## 2024-02-02 PROCEDURE — 700111 HCHG RX REV CODE 636 W/ 250 OVERRIDE (IP): Mod: JZ,JG | Performed by: INTERNAL MEDICINE

## 2024-02-02 PROCEDURE — 700111 HCHG RX REV CODE 636 W/ 250 OVERRIDE (IP): Performed by: STUDENT IN AN ORGANIZED HEALTH CARE EDUCATION/TRAINING PROGRAM

## 2024-02-02 PROCEDURE — A9270 NON-COVERED ITEM OR SERVICE: HCPCS | Performed by: INTERNAL MEDICINE

## 2024-02-02 PROCEDURE — 700111 HCHG RX REV CODE 636 W/ 250 OVERRIDE (IP): Performed by: INTERNAL MEDICINE

## 2024-02-02 PROCEDURE — 85027 COMPLETE CBC AUTOMATED: CPT

## 2024-02-02 PROCEDURE — 82962 GLUCOSE BLOOD TEST: CPT | Mod: 91

## 2024-02-02 PROCEDURE — 770006 HCHG ROOM/CARE - MED/SURG/GYN SEMI*

## 2024-02-02 PROCEDURE — 90935 HEMODIALYSIS ONE EVALUATION: CPT | Performed by: INTERNAL MEDICINE

## 2024-02-02 PROCEDURE — 97166 OT EVAL MOD COMPLEX 45 MIN: CPT

## 2024-02-02 PROCEDURE — 700111 HCHG RX REV CODE 636 W/ 250 OVERRIDE (IP)

## 2024-02-02 PROCEDURE — 97535 SELF CARE MNGMENT TRAINING: CPT

## 2024-02-02 PROCEDURE — 99233 SBSQ HOSP IP/OBS HIGH 50: CPT | Performed by: STUDENT IN AN ORGANIZED HEALTH CARE EDUCATION/TRAINING PROGRAM

## 2024-02-02 PROCEDURE — 80048 BASIC METABOLIC PNL TOTAL CA: CPT

## 2024-02-02 PROCEDURE — 700102 HCHG RX REV CODE 250 W/ 637 OVERRIDE(OP): Performed by: INTERNAL MEDICINE

## 2024-02-02 PROCEDURE — 700102 HCHG RX REV CODE 250 W/ 637 OVERRIDE(OP): Mod: JZ | Performed by: STUDENT IN AN ORGANIZED HEALTH CARE EDUCATION/TRAINING PROGRAM

## 2024-02-02 PROCEDURE — 700105 HCHG RX REV CODE 258: Performed by: STUDENT IN AN ORGANIZED HEALTH CARE EDUCATION/TRAINING PROGRAM

## 2024-02-02 PROCEDURE — A9270 NON-COVERED ITEM OR SERVICE: HCPCS | Mod: JZ | Performed by: STUDENT IN AN ORGANIZED HEALTH CARE EDUCATION/TRAINING PROGRAM

## 2024-02-02 PROCEDURE — 90935 HEMODIALYSIS ONE EVALUATION: CPT

## 2024-02-02 PROCEDURE — 97163 PT EVAL HIGH COMPLEX 45 MIN: CPT

## 2024-02-02 PROCEDURE — 36415 COLL VENOUS BLD VENIPUNCTURE: CPT

## 2024-02-02 PROCEDURE — 83735 ASSAY OF MAGNESIUM: CPT

## 2024-02-02 RX ORDER — POTASSIUM CHLORIDE 20 MEQ/1
40 TABLET, EXTENDED RELEASE ORAL ONCE
Status: COMPLETED | OUTPATIENT
Start: 2024-02-02 | End: 2024-02-02

## 2024-02-02 RX ORDER — HEPARIN SODIUM 1000 [USP'U]/ML
INJECTION, SOLUTION INTRAVENOUS; SUBCUTANEOUS
Status: COMPLETED
Start: 2024-02-02 | End: 2024-02-02

## 2024-02-02 RX ORDER — CALCIUM CARBONATE 500 MG/1
500 TABLET, CHEWABLE ORAL DAILY
Status: DISCONTINUED | OUTPATIENT
Start: 2024-02-02 | End: 2024-02-03

## 2024-02-02 RX ADMIN — OXYCODONE HYDROCHLORIDE 10 MG: 10 TABLET ORAL at 06:13

## 2024-02-02 RX ADMIN — CEFAZOLIN 3 G: 1 INJECTION, POWDER, FOR SOLUTION INTRAMUSCULAR; INTRAVENOUS at 17:53

## 2024-02-02 RX ADMIN — HYDROMORPHONE HYDROCHLORIDE 0.5 MG: 1 INJECTION, SOLUTION INTRAMUSCULAR; INTRAVENOUS; SUBCUTANEOUS at 03:12

## 2024-02-02 RX ADMIN — EPOETIN ALFA-EPBX 5000 UNITS: 3000 INJECTION, SOLUTION INTRAVENOUS; SUBCUTANEOUS at 10:38

## 2024-02-02 RX ADMIN — OMEPRAZOLE 20 MG: 20 CAPSULE, DELAYED RELEASE ORAL at 06:13

## 2024-02-02 RX ADMIN — CHOLESTYRAMINE 4 G: 4 POWDER, FOR SUSPENSION ORAL at 06:13

## 2024-02-02 RX ADMIN — HEPARIN SODIUM 5000 UNITS: 5000 INJECTION, SOLUTION INTRAVENOUS; SUBCUTANEOUS at 06:13

## 2024-02-02 RX ADMIN — OXYCODONE HYDROCHLORIDE 10 MG: 10 TABLET ORAL at 01:53

## 2024-02-02 RX ADMIN — ASPIRIN 81 MG: 81 TABLET, COATED ORAL at 07:36

## 2024-02-02 RX ADMIN — CHOLESTYRAMINE 4 G: 4 POWDER, FOR SUSPENSION ORAL at 17:50

## 2024-02-02 RX ADMIN — HEPARIN SODIUM 3600 UNITS: 1000 INJECTION, SOLUTION INTRAVENOUS; SUBCUTANEOUS at 10:39

## 2024-02-02 RX ADMIN — POTASSIUM CHLORIDE 40 MEQ: 1500 TABLET, EXTENDED RELEASE ORAL at 12:21

## 2024-02-02 RX ADMIN — ACETAMINOPHEN 1000 MG: 500 TABLET ORAL at 17:50

## 2024-02-02 RX ADMIN — HEPARIN SODIUM 5000 UNITS: 5000 INJECTION, SOLUTION INTRAVENOUS; SUBCUTANEOUS at 14:44

## 2024-02-02 RX ADMIN — HEPARIN SODIUM 5000 UNITS: 5000 INJECTION, SOLUTION INTRAVENOUS; SUBCUTANEOUS at 20:58

## 2024-02-02 RX ADMIN — OXYCODONE HYDROCHLORIDE 10 MG: 10 TABLET ORAL at 19:37

## 2024-02-02 RX ADMIN — ATORVASTATIN CALCIUM 80 MG: 40 TABLET, FILM COATED ORAL at 20:58

## 2024-02-02 RX ADMIN — ACETAMINOPHEN 1000 MG: 500 TABLET ORAL at 12:21

## 2024-02-02 RX ADMIN — ACETAMINOPHEN 1000 MG: 500 TABLET ORAL at 06:13

## 2024-02-02 ASSESSMENT — COGNITIVE AND FUNCTIONAL STATUS - GENERAL
SUGGESTED CMS G CODE MODIFIER MOBILITY: CL
HELP NEEDED FOR BATHING: A LOT
PERSONAL GROOMING: A LITTLE
DRESSING REGULAR UPPER BODY CLOTHING: A LITTLE
DRESSING REGULAR LOWER BODY CLOTHING: A LOT
STANDING UP FROM CHAIR USING ARMS: TOTAL
MOBILITY SCORE: 11
EATING MEALS: A LITTLE
SUGGESTED CMS G CODE MODIFIER DAILY ACTIVITY: CK
MOVING TO AND FROM BED TO CHAIR: A LITTLE
TOILETING: A LOT
CLIMB 3 TO 5 STEPS WITH RAILING: TOTAL
MOVING FROM LYING ON BACK TO SITTING ON SIDE OF FLAT BED: A LOT
TURNING FROM BACK TO SIDE WHILE IN FLAT BAD: A LITTLE
WALKING IN HOSPITAL ROOM: TOTAL
DAILY ACTIVITIY SCORE: 15

## 2024-02-02 ASSESSMENT — GAIT ASSESSMENTS: GAIT LEVEL OF ASSIST: UNABLE TO PARTICIPATE

## 2024-02-02 ASSESSMENT — ACTIVITIES OF DAILY LIVING (ADL): TOILETING: INDEPENDENT

## 2024-02-02 NOTE — THERAPY
Physical Therapy   Initial Evaluation     Patient Name: Beni Moore  Age:  53 y.o., Sex:  male  Medical Record #: [id number]  Today's Date: 2/2/2024     Precautions  Precautions: Fall Risk;Non Weight Bearing Left Lower Extremity    Assessment  Patient is 53 y.o. male with a diagnosis of septic shock, now s/p L BKA.  Additional factors influencing patient status / progress : today, pt appears overwhelmed, fearful, but very motivated, good participation, eager to learn. Pt is stand by assist to come to EOB with HOB tilted and used rail. Pt needed mod A for sit to stand with bed height raised, but showed good control on descent back to bed. Pt did not feel confident with attempting to walk. Education done, handouts given re positioning and therex. Pt will need to trial ambulation once family brings a show for his left foot (transmet amputation years ago) and pivot to chair. Pt is asking for a wc for use at home, will benefit from this until fitted with prosthesis. Rep in today to fit pt with stump protector as well. PT to follow. Pt will benefit from one more PT session before dc to practice above noted activity to ensure safe dc..       Plan    Physical Therapy Initial Treatment Plan   Treatment Plan : Bed Mobility, Gait Training, Neuro Re-Education / Balance, Therapeutic Activities  Treatment Frequency: 3 Times per Week  Duration: Until Therapy Goals Met    DC Equipment Recommendations: Wheelchair  Discharge Recommendations: Recommend home health for continued physical therapy services (pt very much wants to dc to home, not post acute placement.)            Objective       02/02/24 1358   Charge Group   PT Evaluation PT Evaluation High   PT Self Care / Home Evaluation (Units) 1   Total Time Spent   PT Total Time Yes   PT Evaluation Time Spent (Mins) 20   PT Self Care/Home Evaluation Time Spent (Mins) 15   PT Total Time Spent (Calculated) 35   Initial Contact Note    Initial Contact Note Order Received and Verified,  Physical Therapy Evaluation in Progress with Full Report to Follow.   Precautions   Precautions Fall Risk;Non Weight Bearing Left Lower Extremity   Pain 0 - 10 Group   Therapist Pain Assessment During Activity;Nurse Notified  (no c/o pain)   Prior Living Situation   Prior Services None   Housing / Facility 1 Story House   Steps Into Home 1  (pt plans to have Dad bump him up in wc, family has done this before with other family members and feel comfortable with this.)   Steps In Home 0   Equipment Owned Front-Wheel Walker   Lives with - Patient's Self Care Capacity Parents  (home as needed, can assist as needed)   Prior Level of Functional Mobility   Bed Mobility Independent   Transfer Status Independent   Ambulation Independent   Ambulation Distance community   Assistive Devices Used None   Stairs Independent   Cognition    Level of Consciousness Alert   Active ROM Lower Body    Active ROM Lower Body  X   Comments R foot Transmet amputation, L BKA. L knee with full extension   Strength Lower Body   Lower Body Strength  X   Comments R LE functional for sit to stand, but pt reports feeling below baseline strength.   Other Treatments   Other Treatments Provided handouts given and reviewed for BKA positioning and therex.   Balance Assessment   Sitting Balance (Static) Fair   Sitting Balance (Dynamic) Fair -   Standing Balance (Static) Trace +   Weight Shift Sitting Fair   Weight Shift Standing Absent   Bed Mobility    Supine to Sit Standby Assist   Sit to Supine Standby Assist   Scooting Standby Assist   Rolling   (pivoted using rail to come to sit with HOB up.)   Gait Analysis   Gait Level Of Assist Unable to Participate   Comments pt fearful, but agreeable to sit to stand   Functional Mobility   Sit to Stand Moderate Assist  (bed height raised, x 2 reps total)   How much difficulty does the patient currently have...   Turning over in bed (including adjusting bedclothes, sheets and blankets)? 3   Sitting down on and  standing up from a chair with arms (e.g., wheelchair, bedside commode, etc.) 2   Moving from lying on back to sitting on the side of the bed? 3   How much help from another person does the patient currently need...   Moving to and from a bed to a chair (including a wheelchair)? 1   Need to walk in a hospital room? 1   Climbing 3-5 steps with a railing? 1   6 clicks Mobility Score 11   Activity Tolerance   Sitting Edge of Bed 15+   Short Term Goals    Short Term Goal # 1 Pt will perform bed mobility with flat bed, no rail with supervision in 6 visits.   Short Term Goal # 2 Pt will transfer bed to  via stand-pivot with supervision in 6 visits to improve functional indep.   Short Term Goal # 3 Pt will ambulate x 15 feet using FWW with supervision in 6 visits to improve functional indep.   Education Group   Education Provided Role of Physical Therapist;Exercises - Supine;Exercises - Seated   Role of Physical Therapist Patient Response Patient;Family;Acceptance;Explanation;Verbal Demonstration   Exercises - Supine Patient Response Patient;Family;Acceptance;Explanation;Demonstration;Handout;Verbal Demonstration   Exercises - Seated Patient Response Patient;Family;Acceptance;Explanation;Handout;Demonstration;Verbal Demonstration   Physical Therapy Initial Treatment Plan    Treatment Plan  Bed Mobility;Gait Training;Neuro Re-Education / Balance;Therapeutic Activities   Treatment Frequency 3 Times per Week   Duration Until Therapy Goals Met   Problem List    Problems Impaired Bed Mobility;Impaired Transfers;Impaired Ambulation;Functional Strength Deficit;Impaired Balance;Decreased Activity Tolerance   Anticipated Discharge Equipment and Recommendations   DC Equipment Recommendations Wheelchair   Discharge Recommendations Recommend home health for continued physical therapy services  (pt very much wants to dc to home, not post acute placement.)   Interdisciplinary Plan of Care Collaboration   IDT Collaboration with  Nursing    Patient Position at End of Therapy In Bed;Call Light within Reach;Tray Table within Reach;Phone within Reach;Family / Friend in Room   Collaboration Comments nsg updated.   Session Information   Date / Session Number  2/2-1 (1/3, 2/8)

## 2024-02-02 NOTE — DISCHARGE PLANNING
[facility] Transitional Care Coordination    Referral from: Dr Sanders  Insurance Provider on Facesheet: Medicare  Potential Rehab Diagnosis: BKA    Chart review indicates patient may have on going medical management and may have therapy needs to possibly meet inpatient rehab facility criteria with the goal of returning to community.    D/C support: Spouse     Physiatry consultation forwarded per protocol.     Physiatry to consult, TCC will follow.     Thank you for the referral.

## 2024-02-02 NOTE — PROGRESS NOTES
Ashok from Lab called with critical result of calcium of 6.5 at 0407. Critical lab result read back to Ashok.   Susannah cunningham notified of critical lab result at 0407.  Critical lab result read back by Susannah cunningham.

## 2024-02-02 NOTE — DISCHARGE PLANNING
Case Management Discharge Planning    Admission Date: 1/25/2024  GMLOS: 9.9  ALOS: 8    6-Clicks ADL Score: 21  6-Clicks Mobility Score: 16  PT and/or OT Eval ordered: Yes  Post-acute Referrals Ordered: Yes  Post-acute Choice Obtained: No  Has referral(s) been sent to post-acute provider:  Yes    Anticipated Discharge Dispo: Discharge Disposition: D/T to home under HHA care in anticipation of covered skilled care (06)  Discharge Address: [address] [place] [id number]  Discharge Contact Phone Number: [phone]    DME Needed: No    Action(s) Taken:   Pt discussed during morning rounds. Pending placement.   DH chair time schedule is M/T/F @ 0545 at [facility].     SNFs unable to accommodate early morning HD chair time.   JUAN RN sent communication to Dialysis coordinator requesting a later HD chair time, if available.     1118  New HD chair time set for 1415 M/W/F at [facility].     1215  No accepting local SNFs due to lack of HD beds available or inability to accommodate HD chair times.     JUAN RN met with pt at bedside and discussed discharge plan. Pt stated he wants to go home with Home Health.   Pending PT/OT notes.     1440  PT recommending home with HH for continued physical therapy.     JUAN RN met with pt and family (mother and father) to discuss discharge plan and obtain HH choice. Pt declined HH services. HH Choice form was left at pt's bedside.     Escalations Completed: None    Medically Clear: No    Next Steps:  JUAN RN to follow up with medical team to discuss discharge barriers or needs.     Barriers to Discharge: Medical clearance, Pending Placement, Dialysis, and Transportation    Is the patient up for discharge tomorrow: No

## 2024-02-02 NOTE — PROGRESS NOTES
POD#1  S/P BKA  [facility] to see today  Knee immobilizer or stump protector to prevent contracture  Pain control  Case coordination

## 2024-02-02 NOTE — PROGRESS NOTES
Hospital Medicine Daily Progress Note    Date of Service  2/2/2024    Chief Complaint  Beni Moore is a 53 y.o. male admitted 1/25/2024 with toe infection    Hospital Course     53 y.o. 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  X-ray revealed pathologic fracture and was consistent with osteomyelitis with gas in the soft tissues.  WBC count was 24.7 thousand.  His creatinine was quite elevated at 6.36.  He was hypotensive.  He was placed on IV Zosyn and Zyvox, along with IV fluids.  Orthopedics and nephrology were consulted.  Septic shock has improved, and patient subsequently underwent left fifth ray amputation through metatarsal shaft with irrigation and debridement of left plantar foot abscess. Due to ongoing elevated creatinine, a tunneled dialysis catheter was placed and patient started on hemodialysis on 1/31/2024.  Surgical cultures remain negative.  Blood cultures remain negative.  Patient underwent through left BKA on 2/1/2024 by Dr. Segura.      Interval Problem Update    2/2/2024  Patient seen and examined at bedside  Vitals remained stable  On 1 L oxygen saturating 90%  Reports BKA site pain.  Labs reviewed, noted leukocytosis, hemoglobin 8.2, chemistry noted with sodium 128, potassium 3.5 , chemistry noted with ESRD on hemodialysis  Chart reviewed, meds reviewed    He had session of hemodialysis today,  Nephrology and hemodialysis  Patient did have elevated leukocytes, will consider antibiotic for now  If white count continues to improve discontinue antibiotic agents and control.  On IV narcotics for pain management, monitor for toxicity  Add Flexeril  Repeat BMP in a.m.  to monitor electrolytes and renal function   repeat CBC in a.m. to monitor white count and hemoglobin.  He agreed for PMR evaluation.  Rehab has been consulted      I have discussed this patient's plan of care and discharge plan at IDT rounds today with Case Management, Nursing, Nursing leadership, and other members of the IDT team.    Consultants/Specialty  nephrology and orthopedics    Code Status  Full Code    Disposition  The patient is not medically cleared for discharge to home or a post-acute facility.  Anticipate discharge to: skilled nursing facility    I have placed the appropriate orders for post-discharge needs.    Review of Systems  Review of Systems   Musculoskeletal:         Left BKA site pain        Physical Exam  Temp:  [35.9 °C (96.6 °F)-36.6 °C (97.8 °F)] 36.2 °C (97.1 °F)  Pulse:  [68-81] 74  Resp:  [12-19] 16  BP: (115-163)/(61-88) 137/76  SpO2:  [98 %-100 %] 98 %    Physical Exam  Constitutional:       Appearance: Normal appearance.   HENT:      Mouth/Throat:      Mouth: Mucous membranes are dry.   Cardiovascular:      Rate and Rhythm: Normal rate and regular rhythm.   Pulmonary:      Effort: Pulmonary effort is normal.      Breath sounds: Normal breath sounds.   Abdominal:      General: There is no distension.   Musculoskeletal:      Comments: Left BKA, wrapped with clean dressing   Neurological:      General: No focal deficit present.      Mental Status: He is alert and oriented to person, place, and time.   Psychiatric:         Mood and Affect: Mood normal.         Fluids    Intake/Output Summary (Last 24 hours) at 2/2/2024 1421  Last data filed at 2/2/2024 1100  Gross per 24 hour   Intake 500 ml   Output 10 ml   Net 490 ml       Laboratory  Recent Labs     01/31/24  0830 02/01/24  0227 02/02/24  0306   WBC 12.1* 10.2 11.2*   RBC 2.91* 2.87* 2.90*   HEMOGLOBIN 8.3* 8.1* 8.2*   HEMATOCRIT 25.2* 24.7* 25.6*   MCV 86.6 86.1 88.3   MCH 28.5 28.2 28.3   MCHC 32.9 32.8 32.0*   RDW 44.0 43.5 44.2    PLATELETCT 250 233 188   MPV 9.3 9.4 9.4     Recent Labs     01/31/24  0830 02/01/24  0227 02/02/24  0306   SODIUM 130* 131* 128*   POTASSIUM 3.7 3.6 3.5*   CHLORIDE 97 98 97   CO2 24 24 23   GLUCOSE 203* 212* 231*   BUN 32* 27* 32*   CREATININE 2.91* 3.13* 3.35*   CALCIUM 6.4* 6.5* 6.5*                   Imaging  MR-FOOT-W/O LEFT   Final Result      1.  Marrow edema involving the fifth metatarsal head with also seen marrow edema and fragmentation of the fifth proximal phalanx and marrow edema involving the middle and distal phalanges. This most likely represents osteomyelitis with pathologic    fracture. There is surrounding cellulitis.      2.  Mild marrow edema involving the fourth metatarsal head and the fourth proximal phalanx possibly reactive in nature versus osteomyelitis.      3.  Edema/induration of the soft tissues of the forefoot, midfoot and hindfoot with also seen involvement of the intrinsic muscles of the foot likely representing cellulitis and myositis. There are punctate areas of gas within the soft tissues of the    forefoot and midfoot which may indicate areas of tissue necrosis.      4.  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      IR-SABA,GROSHONG PLACEMENT >5   Final Result      1. ULTRASOUND AND FLUOROSCOPIC GUIDED PLACEMENT OF A Right INTERNAL JUGULAR 14.5 [place] GLIDE PATH TUNNELED DIALYSIS CATHETER.      2. THE HEMODIALYSIS CATHETER MAY BE USED IMMEDIATELY AS CLINICALLY INDICATED. FLUSHES PER PROTOCOL.         US-FLOWER SINGLE LEVEL BILAT   Final Result      DX-CHEST-PORTABLE (1 VIEW)   Final Result      CT-FOOT W/O PLUS RECONS LEFT   Final Result      1.  Findings are consistent with osteomyelitis and pathologic fracture involving the proximal phalanx of the fifth digit.      2.  Soft tissue swelling and emphysema is present around the fifth digit and in the plantar soft tissues of the foot. These findings are consistent with cellulitis.      DX-FOOT-COMPLETE 3+ LEFT    Final Result      1.  Comminuted fracture proximal phalanx fifth digit is identified which could be pathologic and related to osteomyelitis.      2.  Soft tissue swelling and emphysema in the fifth digit which could be due to cellulitis.      DX-CHEST-PORTABLE (1 VIEW)   Final Result         No acute cardiac or pulmonary abnormality is identified.           Assessment/Plan  * Septic shock (HCC)- (present on admission)  Assessment & Plan  This is Septic shock Present on admission  SIRS criteria identified on my evaluation include: Leukocytosis, with WBC greater than 12,000  Clinical indicators of end organ dysfunction include Hypotension with systolic blood pressure less than 90 or MAP less than 65  Indicators of septic shock include: Sepsis present and persistent hypotension despite fluid resuscitation   Sources is: Foot infection  Continue sepsis protocol.  Received IV fluids.  Continue antibiotics.  Reassessment: I have reassessed the patient's hemodynamic status    Hypocalcemia- (present on admission)  Assessment & Plan  Corrected calcium by albumin is normal.  No further replacement necessary.    Soft tissue infection of foot- (present on admission)  Assessment & Plan  As above    Osteomyelitis and soft tissue infection of left foot (HCC)- (present on admission)  Assessment & Plan  Noted on CT scan and MRI.  Has extensive soft tissue infection with osteomyelitis.  Orthopedics on board, s/p left fifth ray amputation through metatarsal shaft with irrigation and debridement of left plantar foot abscess.  Orthopedics planning for BKA today 2/1/2024.  Completed antibiotics.  Post BKA, will have achieved adequate source control and has no further antibiotic needs.  Continue pain control.    Type 2 diabetes mellitus with kidney complication, with long-term current use of insulin (HCC)- (present on admission)  Assessment & Plan  HbA1c 7.2.  Continue sliding scale insulin coverage while in house.  Holding long-acting  insulin given now that he is hemodialysis dependent and at risk for insulin stacking/accumulation.  Accu-Cheks before meals and at bedtime. Goal to keep BG between 140-180 per 2019 ADA guidelines.  Continue diabetic diet.      Acute renal failure superimposed on stage 3b chronic kidney disease (HCC)- (present on admission)  Assessment & Plan  Patient now started on hemodialysis 1/29/2024.  Nephrology following.  Outpatient hemodialysis chair arranged.      Normocytic anemia- (present on admission)  Assessment & Plan  Likely component of chronic disease as well as chronic kidney disease  Hemoglobin stable at 8.1.   Erythropoietin likely to be initiated now that he is on dialysis  Continue to trend hemoglobin daily.  Restrictive transfusion strategy, transfuse if drops below 7.         VTE prophylaxis: Heparin subcu    I have performed a physical exam and reviewed and updated ROS and Plan today (2/2/2024). In review of yesterday's note (2/1/2024), there are no changes except as documented above.         Greater than 51 minutes spent preparing to see patient (e.g. review of tests) obtaining and/or reviewing separately obtained history. Performing a medically appropriate examination and/ evaluation.  Counseling and educating the patient/family/caregiver.  Ordering medications, tests, or procedures.  Referring and communicating with other health care professionals.  Documenting clinical information in EPIC.  Independently interpreting results and communicating results to patient/family/caregiver.  Care coordination.

## 2024-02-02 NOTE — PROGRESS NOTES
Received report and assumed care of patient at change of shift. Patient is A&Ox4, on RA, and reports 6/10 pain at this time. Patient assessment completed, bed in lowest position, and call light and personal belongings are within reach. Patient expressed no further needs at this time.

## 2024-02-02 NOTE — PROCEDURES
Diagnosis:  End-Stage Renal Disease. Patient seen and examined on hemodialysis during treatment. Patient is stable, tolerating hemodialysis. Denies chest pain and shortness of breath. Orders updated as needed. Please refer to flowsheet for full details.    Access: R IJ TDC  UF goal: 2-3 kg     Plan: Continue routine hemodialysis for ongoing clearance and volume needs.    Rosmery Leon MD  Nephrology   [facility]

## 2024-02-02 NOTE — THERAPY
"Occupational Therapy   Initial Evaluation     Patient Name: Beni Moore  Age:  53 y.o., Sex:  male  Medical Record #: [id number]  Today's Date: 2/2/2024     Precautions  Precautions: Fall Risk, Non Weight Bearing Left Lower Extremity  Comments: s/p L BKA 2/1; HD MWF    Assessment  Patient is 53 y.o. male with a diagnosis of nonviable LLE, now s/p L BKA. He has a hx of R TMA and ESRD on HD MWF.  Additional factors influencing patient status / progress: weakness, fatigue, impaired balance, pain.      Plan    Occupational Therapy Initial Treatment Plan   Treatment Interventions: Self Care / Activities of Daily Living, Adaptive Equipment, Neuro Re-Education / Balance, Therapeutic Exercises, Therapeutic Activity  Treatment Frequency: 4 Times per Week  Duration: Until Therapy Goals Met    DC Equipment Recommendations: Tub Transfer Bench, Bed Side Commode, Reacher, Bed Rails, Hand Held Shower  Discharge Recommendations: Recommend home health for continued occupational therapy services (pt refusing post acute placement, wants home w/ home health and family support; this appears to be feasible with 2-3 more OT visits in house)     Subjective    \"I really want to go home, I don't want to go to a rehab.\"     Objective       02/02/24 1307   Prior Living Situation   Prior Services Home-Independent   Housing / Facility 1 Story House   Steps Into Home 1   Bathroom Set up Walk In Shower;Bathtub / Shower Combination   Equipment Owned Front-Wheel Walker   Lives with - Patient's Self Care Capacity Parents   Comments Pt's parents present. They are able to assist as needed.   Prior Level of ADL Function   Self Feeding Independent   Grooming / Hygiene Independent   Bathing Independent   Dressing Independent   Toileting Independent   Prior Level of IADL Function   Prior Level Of Mobility Independent Without Device in Community;Independent Without Device in Home   Precautions   Precautions Fall Risk;Non Weight Bearing Left Lower Extremity "   Comments s/p L BKA 2/1; HD MWF   Pain 0 - 10 Group   Therapist Pain Assessment Nurse Notified;During Activity  (min c/o L BKA pain)   Cognition    Cognition / Consciousness X   Level of Consciousness Alert   Comments pleasant and cooperative; [place], needs redirection to activity at hand; a bit of anxious behavior throughout   Active ROM Upper Body   Active ROM Upper Body  WDL   Strength Upper Body   Upper Body Strength  WDL   Balance Assessment   Sitting Balance (Static) Fair +   Sitting Balance (Dynamic) Fair   Standing Balance (Static) Poor   Standing Balance (Dynamic) Trace   Weight Shift Sitting Fair   Weight Shift Standing Poor   Comments w/ FWW; unable to take side steps today; focused on how much water weight he has gained and how that is limiting him   Bed Mobility    Supine to Sit Standby Assist   Sit to Supine Standby Assist   Scooting Standby Assist   ADL Assessment   Lower Body Dressing Moderate Assist  (mom assisting with threading BLE through underwear, pt able to bridge in bed and pull them up on his own)   Toileting   (declined need)   Comments mom reports she will be avail to help as needed, pt and mom ok with this   How much help from another person does the patient currently need...   Putting on and taking off regular lower body clothing? 2   Bathing (including washing, rinsing, and drying)? 2   Toileting, which includes using a toilet, bedpan, or urinal? 2   Putting on and taking off regular upper body clothing? 3   Taking care of personal grooming such as brushing teeth? 3   Eating meals? 3   6 Clicks Daily Activity Score 15   Functional Mobility   Sit to Stand Moderate Assist   Bed, Chair, Wheelchair Transfer Unable to Participate   Mobility EOB>STS x2>BTB   Comments w/ FWW; bed height elevated   Patient / Family Goals   Patient / Family Goal #1 go home instead of rehab   Short Term Goals   Short Term Goal # 1 pt will demo ADL txfs w/ supv   Short Term Goal # 2 pt will dress LB with supv and AE  prn   Short Term Goal # 3 pt will demo toileting w/ supv

## 2024-02-02 NOTE — CARE PLAN
The patient is Stable - Low risk of patient condition declining or worsening    Shift Goals  Clinical Goals: Dialysis, PT/OT  Patient Goals: Rest  Family Goals: NA    Progress made toward(s) clinical / shift goals:  Patient and family verbalized understanding of plan of care and education. Patient's pain was controlled with pharmacological and nonpharmacological comfort measures. Patient was free from falls and no changes in skin integrity were noticed during this shift.  Problem: Knowledge Deficit - Standard  Goal: Patient and family/care givers will demonstrate understanding of plan of care, disease process/condition, diagnostic tests and medications  Outcome: Progressing     Problem: Hemodynamics  Goal: Patient's hemodynamics, fluid balance and neurologic status will be stable or improve  Outcome: Progressing     Problem: Fluid Volume  Goal: Fluid volume balance will be maintained  Outcome: Progressing     Problem: Urinary - Renal Perfusion  Goal: Ability to achieve and maintain adequate renal perfusion and functioning will improve  Outcome: Progressing     Problem: Respiratory  Goal: Patient will achieve/maintain optimum respiratory ventilation and gas exchange  Outcome: Progressing     Problem: Physical Regulation  Goal: Diagnostic test results will improve  Outcome: Progressing  Goal: Signs and symptoms of infection will decrease  Outcome: Progressing     Problem: Pain - Standard  Goal: Alleviation of pain or a reduction in pain to the patient’s comfort goal  Outcome: Progressing     Problem: Skin Integrity  Goal: Skin integrity is maintained or improved  Outcome: Progressing     Problem: Fall Risk  Goal: Patient will remain free from falls  Outcome: Progressing       Patient is not progressing towards the following goals:

## 2024-02-02 NOTE — CARE PLAN
The patient is Stable - Low risk of patient condition declining or worsening    Shift Goals  Clinical Goals: Post op vitals will be completed per protocol  Patient Goals: Rest  Family Goals: Comfort    Progress made toward(s) clinical / shift goals:      Problem: Hemodynamics  Goal: Patient's hemodynamics, fluid balance and neurologic status will be stable or improve  Outcome: Progressing     Problem: Urinary - Renal Perfusion  Goal: Ability to achieve and maintain adequate renal perfusion and functioning will improve  Outcome: Progressing     Problem: Pain - Standard  Goal: Alleviation of pain or a reduction in pain to the patient’s comfort goal  Outcome: Progressing       Patient is not progressing towards the following goals:

## 2024-02-03 LAB
ANION GAP SERPL CALC-SCNC: 9 MMOL/L (ref 7–16)
BACTERIA TISS AEROBE CULT: ABNORMAL
BACTERIA WND AEROBE CULT: ABNORMAL
BASOPHILS # BLD AUTO: 0.3 % (ref 0–1.8)
BASOPHILS # BLD: 0.03 K/UL (ref 0–0.12)
BUN SERPL-MCNC: 21 MG/DL (ref 8–22)
CA-I SERPL-SCNC: 1 MMOL/L (ref 1.1–1.3)
CALCIUM SERPL-MCNC: 6.6 MG/DL (ref 8.5–10.5)
CHLORIDE SERPL-SCNC: 99 MMOL/L (ref 96–112)
CO2 SERPL-SCNC: 24 MMOL/L (ref 20–33)
CREAT SERPL-MCNC: 2.6 MG/DL (ref 0.5–1.4)
EOSINOPHIL # BLD AUTO: 0.17 K/UL (ref 0–0.51)
EOSINOPHIL NFR BLD: 1.6 % (ref 0–6.9)
ERYTHROCYTE [DISTWIDTH] IN BLOOD BY AUTOMATED COUNT: 45.7 FL (ref 35.9–50)
GFR SERPLBLD CREATININE-BSD FMLA CKD-EPI: 28 ML/MIN/1.73 M 2
GLUCOSE BLD STRIP.AUTO-MCNC: 170 MG/DL (ref 65–99)
GLUCOSE BLD STRIP.AUTO-MCNC: 189 MG/DL (ref 65–99)
GLUCOSE BLD STRIP.AUTO-MCNC: 218 MG/DL (ref 65–99)
GLUCOSE BLD STRIP.AUTO-MCNC: 221 MG/DL (ref 65–99)
GLUCOSE SERPL-MCNC: 197 MG/DL (ref 65–99)
GRAM STN SPEC: ABNORMAL
GRAM STN SPEC: ABNORMAL
HCT VFR BLD AUTO: 23.9 % (ref 42–52)
HGB BLD-MCNC: 7.5 G/DL (ref 14–18)
IMM GRANULOCYTES # BLD AUTO: 0.06 K/UL (ref 0–0.11)
IMM GRANULOCYTES NFR BLD AUTO: 0.6 % (ref 0–0.9)
LYMPHOCYTES # BLD AUTO: 1.84 K/UL (ref 1–4.8)
LYMPHOCYTES NFR BLD: 17.5 % (ref 22–41)
MCH RBC QN AUTO: 28.4 PG (ref 27–33)
MCHC RBC AUTO-ENTMCNC: 31.4 G/DL (ref 32.3–36.5)
MCV RBC AUTO: 90.5 FL (ref 81.4–97.8)
MONOCYTES # BLD AUTO: 0.72 K/UL (ref 0–0.85)
MONOCYTES NFR BLD AUTO: 6.9 % (ref 0–13.4)
NEUTROPHILS # BLD AUTO: 7.68 K/UL (ref 1.82–7.42)
NEUTROPHILS NFR BLD: 73.1 % (ref 44–72)
NRBC # BLD AUTO: 0 K/UL
NRBC BLD-RTO: 0 /100 WBC (ref 0–0.2)
PLATELET # BLD AUTO: 146 K/UL (ref 164–446)
PMV BLD AUTO: 9.5 FL (ref 9–12.9)
POTASSIUM SERPL-SCNC: 4.1 MMOL/L (ref 3.6–5.5)
POTASSIUM SERPL-SCNC: 4.1 MMOL/L (ref 3.6–5.5)
PTH-INTACT SERPL-MCNC: 272 PG/ML (ref 14–72)
RBC # BLD AUTO: 2.64 M/UL (ref 4.7–6.1)
SIGNIFICANT IND 70042: ABNORMAL
SIGNIFICANT IND 70042: ABNORMAL
SITE SITE: ABNORMAL
SITE SITE: ABNORMAL
SODIUM SERPL-SCNC: 132 MMOL/L (ref 135–145)
SOURCE SOURCE: ABNORMAL
SOURCE SOURCE: ABNORMAL
WBC # BLD AUTO: 10.5 K/UL (ref 4.8–10.8)

## 2024-02-03 PROCEDURE — 82652 VIT D 1 25-DIHYDROXY: CPT

## 2024-02-03 PROCEDURE — A9270 NON-COVERED ITEM OR SERVICE: HCPCS | Performed by: STUDENT IN AN ORGANIZED HEALTH CARE EDUCATION/TRAINING PROGRAM

## 2024-02-03 PROCEDURE — 80048 BASIC METABOLIC PNL TOTAL CA: CPT

## 2024-02-03 PROCEDURE — A9270 NON-COVERED ITEM OR SERVICE: HCPCS | Performed by: INTERNAL MEDICINE

## 2024-02-03 PROCEDURE — 36415 COLL VENOUS BLD VENIPUNCTURE: CPT

## 2024-02-03 PROCEDURE — 90935 HEMODIALYSIS ONE EVALUATION: CPT

## 2024-02-03 PROCEDURE — 82962 GLUCOSE BLOOD TEST: CPT | Mod: 91

## 2024-02-03 PROCEDURE — 85025 COMPLETE CBC W/AUTO DIFF WBC: CPT

## 2024-02-03 PROCEDURE — 700111 HCHG RX REV CODE 636 W/ 250 OVERRIDE (IP): Performed by: STUDENT IN AN ORGANIZED HEALTH CARE EDUCATION/TRAINING PROGRAM

## 2024-02-03 PROCEDURE — 83970 ASSAY OF PARATHORMONE: CPT

## 2024-02-03 PROCEDURE — 82330 ASSAY OF CALCIUM: CPT

## 2024-02-03 PROCEDURE — 700102 HCHG RX REV CODE 250 W/ 637 OVERRIDE(OP): Performed by: INTERNAL MEDICINE

## 2024-02-03 PROCEDURE — 99232 SBSQ HOSP IP/OBS MODERATE 35: CPT | Performed by: INTERNAL MEDICINE

## 2024-02-03 PROCEDURE — 700102 HCHG RX REV CODE 250 W/ 637 OVERRIDE(OP): Performed by: STUDENT IN AN ORGANIZED HEALTH CARE EDUCATION/TRAINING PROGRAM

## 2024-02-03 PROCEDURE — 99233 SBSQ HOSP IP/OBS HIGH 50: CPT | Performed by: STUDENT IN AN ORGANIZED HEALTH CARE EDUCATION/TRAINING PROGRAM

## 2024-02-03 PROCEDURE — 84132 ASSAY OF SERUM POTASSIUM: CPT

## 2024-02-03 PROCEDURE — 700111 HCHG RX REV CODE 636 W/ 250 OVERRIDE (IP)

## 2024-02-03 PROCEDURE — 770006 HCHG ROOM/CARE - MED/SURG/GYN SEMI*

## 2024-02-03 PROCEDURE — 99024 POSTOP FOLLOW-UP VISIT: CPT | Performed by: SURGERY

## 2024-02-03 RX ORDER — SODIUM CHLORIDE 9 MG/ML
0-250 INJECTION, SOLUTION INTRAVENOUS ONCE
Status: ACTIVE | OUTPATIENT
Start: 2024-02-03 | End: 2024-02-04

## 2024-02-03 RX ORDER — HEPARIN SODIUM 1000 [USP'U]/ML
INJECTION, SOLUTION INTRAVENOUS; SUBCUTANEOUS
Status: COMPLETED
Start: 2024-02-03 | End: 2024-02-03

## 2024-02-03 RX ORDER — CALCIUM CARBONATE 500(1250)
500 TABLET ORAL 2 TIMES DAILY WITH MEALS
Status: DISCONTINUED | OUTPATIENT
Start: 2024-02-03 | End: 2024-02-08 | Stop reason: HOSPADM

## 2024-02-03 RX ADMIN — OXYCODONE HYDROCHLORIDE 10 MG: 10 TABLET ORAL at 09:35

## 2024-02-03 RX ADMIN — CALCIUM 500 MG: 500 TABLET ORAL at 09:38

## 2024-02-03 RX ADMIN — CHOLESTYRAMINE 4 G: 4 POWDER, FOR SUSPENSION ORAL at 06:06

## 2024-02-03 RX ADMIN — ANTACID TABLETS 500 MG: 500 TABLET, CHEWABLE ORAL at 06:06

## 2024-02-03 RX ADMIN — HEPARIN SODIUM 3600 UNITS: 1000 INJECTION, SOLUTION INTRAVENOUS; SUBCUTANEOUS at 15:45

## 2024-02-03 RX ADMIN — ACETAMINOPHEN 1000 MG: 500 TABLET ORAL at 17:19

## 2024-02-03 RX ADMIN — ASPIRIN 81 MG: 81 TABLET, COATED ORAL at 06:06

## 2024-02-03 RX ADMIN — ATORVASTATIN CALCIUM 80 MG: 40 TABLET, FILM COATED ORAL at 21:16

## 2024-02-03 RX ADMIN — OXYCODONE HYDROCHLORIDE 10 MG: 10 TABLET ORAL at 02:36

## 2024-02-03 RX ADMIN — ACETAMINOPHEN 1000 MG: 500 TABLET ORAL at 12:55

## 2024-02-03 RX ADMIN — OMEPRAZOLE 20 MG: 20 CAPSULE, DELAYED RELEASE ORAL at 06:06

## 2024-02-03 RX ADMIN — OXYCODONE 5 MG: 5 TABLET ORAL at 21:16

## 2024-02-03 RX ADMIN — HEPARIN SODIUM 5000 UNITS: 5000 INJECTION, SOLUTION INTRAVENOUS; SUBCUTANEOUS at 13:12

## 2024-02-03 RX ADMIN — HEPARIN SODIUM 5000 UNITS: 5000 INJECTION, SOLUTION INTRAVENOUS; SUBCUTANEOUS at 06:05

## 2024-02-03 RX ADMIN — CHOLESTYRAMINE 4 G: 4 POWDER, FOR SUSPENSION ORAL at 17:19

## 2024-02-03 RX ADMIN — HEPARIN SODIUM 5000 UNITS: 5000 INJECTION, SOLUTION INTRAVENOUS; SUBCUTANEOUS at 21:16

## 2024-02-03 RX ADMIN — CALCIUM 500 MG: 500 TABLET ORAL at 17:19

## 2024-02-03 ASSESSMENT — FIBROSIS 4 INDEX: FIB4 SCORE: 2.5

## 2024-02-03 NOTE — PROGRESS NOTES
Agree, TC      Orthopaedic Progress Note    Interval changes:  Patient doing well    LLE dressings are CDI  Cleared for DC home by ortho pending medicine clearance    ROS - Patient denies any new issues.  Pain well controlled.    BP (!) 153/71   Pulse 74   Temp 36.2 °C (97.2 °F) (Temporal)   Resp 18   Ht 1.829 m (6')   Wt 115 kg (253 lb 4.9 oz)   SpO2 95%     Patient seen and examined  No acute distress  Breathing non labored  RRR  LLE stump protector in place, dressings CDI, no contracture noted.    Recent Labs     02/01/24  0227 02/02/24  0306 02/03/24  0240   WBC 10.2 11.2* 10.5   RBC 2.87* 2.90* 2.64*   HEMOGLOBIN 8.1* 8.2* 7.5*   HEMATOCRIT 24.7* 25.6* 23.9*   MCV 86.1 88.3 90.5   MCH 28.2 28.3 28.4   MCHC 32.8 32.0* 31.4*   RDW 43.5 44.2 45.7   PLATELETCT 233 188 146*   MPV 9.4 9.4 9.5       Active Hospital Problems    Diagnosis     Hypocalcemia [E83.51]     Septic shock ([facility]) [A41.9, R65.21]     Soft tissue infection of foot [L08.9]     Unspecified fracture of left toe(s), initial encounter for open fracture [S92.912B]     Osteomyelitis and soft tissue infection of left foot ([facility]) [M86.9]     Type 2 diabetes mellitus with kidney complication, with long-term current use of insulin ([facility]) [E11.29, Z79.4]     Acute renal failure superimposed on stage 3b chronic kidney disease ([facility]) [N17.9, N18.32]     Normocytic anemia [D64.9]        Assessment/Plan:  Patient doing well    LLE dressings are CDI  Cleared for DC home by ortho pending medicine clearance  POD#2 S/P  Left below knee amputation  POD#4 S/P   1.  Left fifth ray amputation through metatarsal shaft    2. irrigation and debridement of left plantar foot abscess   Wt bearing status - WBAT  Wound care/Drains - Dressings to be changed every other day by nursing. Or PRN for saturation starting POD#2  Future Procedures - none planned    Sutures/Staples out- 14-21 days post operatively. Removal will completed by ortho mid levels  only.  PT/OT-initiated  Antibiotics: zyvox 600mg IV Q12, zosyn 4.5g IV Q12  DVT Prophylaxis- TEDS/SCDs/Foot pumps/heparin  Farrell-not needed per ortho  Case Coordination for Discharge Planning - Disposition per therapy recs.

## 2024-02-03 NOTE — FACE TO FACE
Face to Face Supporting Documentation - Home Health    The encounter with this patient was in whole or in part the primary reason for home health admission.    Date of encounter:   Patient:                    MRN:                       YOB: 2026  Beni Moore  [id number]  1970     Home health to see patient for:  Skilled Nursing care for assessment, interventions & education, Physical Therapy evaluation and treatment, and Occupational therapy evaluation and treatment    Skilled need for:  Exacerbation of Chronic Disease State left BKA    Skilled nursing interventions to include:  Comment: PT/OT    Homebound status evidenced by:  Needs the assistance of another person in order to leave the home. Leaving home requires a considerable and taxing effort. There is a normal inability to leave the home.    Community Physician to provide follow up care: Florinda Pascual M.D.     Optional Interventions? No      I certify the face to face encounter for this home health care referral meets the CMS requirements and the encounter/clinical assessment with the patient was, in whole, or in part, for the medical condition(s) listed above, which is the primary reason for home health care. Based on my clinical findings: the service(s) are medically necessary, support the need for home health care, and the homebound criteria are met.  I certify that this patient has had a face to face encounter by the clinical nurse specialist working collaboratively with me.  Marcelo Sanders M.D. - NPI: [id number]

## 2024-02-03 NOTE — PROGRESS NOTES
Hospital Medicine Daily Progress Note    Date of Service  2/3/2024    Chief Complaint  Beni Moore is a 53 y.o. male admitted 1/25/2024 with toe infection    Hospital Course     53 y.o. 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  X-ray revealed pathologic fracture and was consistent with osteomyelitis with gas in the soft tissues.  WBC count was 24.7 thousand.  His creatinine was quite elevated at 6.36.  He was hypotensive.  He was placed on IV Zosyn and Zyvox, along with IV fluids.  Orthopedics and nephrology were consulted.  Septic shock has improved, and patient subsequently underwent left fifth ray amputation through metatarsal shaft with irrigation and debridement of left plantar foot abscess. Due to ongoing elevated creatinine, a tunneled dialysis catheter was placed and patient started on hemodialysis on 1/31/2024.  Surgical cultures remain negative.  Blood cultures remain negative.  Patient underwent through left BKA on 2/1/2024 by Dr. Segura.      Interval Problem Update    2/3/2024  Patient seen and examined in morning rounds  Vitals been stable  Labs reviewed, leukocytosis noted, hemoglobin 7.5, 132 potassium 3.1, ionized calcium 1, elevated PTH    Chart reviewed, meds reviewed     Case discussed with nephrology  .     His blood sugar remained stable on sliding scale, A1c is around 7.  On discharge we will hold Lantus.    On IV narcotics for pain management, monitor for toxicity  Repeat BMP in a.m. to monitor electrolytes and renal function   repeat CBC in a.m. to monitor white count and hemoglobin.  PT/OT recommending further evaluation.      Patient been  refusing SNF/home health and likes to go home.  Wheelchair has been ordered.  Discussed with daughter regarding home health and wheelchair.      I have discussed this patient's plan of care and discharge plan at IDT rounds today with Case Management, Nursing, Nursing leadership, and other members of the IDT team.    Consultants/Specialty  nephrology and orthopedics    Code Status  Full Code    Disposition  The patient is not medically cleared for discharge to home or a post-acute facility.  Anticipate discharge to: home with close outpatient follow-up    I have placed the appropriate orders for post-discharge needs.    Review of Systems  Review of Systems   Musculoskeletal:         Left BKA site pain        Physical Exam  Temp:  [35.9 °C (96.6 °F)-36.4 °C (97.6 °F)] 36.2 °C (97.2 °F)  Pulse:  [74-84] 74  Resp:  [16-19] 18  BP: (112-153)/(67-74) 153/71  SpO2:  [91 %-95 %] 95 %    Physical Exam  Constitutional:       Appearance: Normal appearance.   HENT:      Mouth/Throat:      Mouth: Mucous membranes are dry.   Cardiovascular:      Rate and Rhythm: Normal rate and regular rhythm.   Pulmonary:      Effort: Pulmonary effort is normal.      Breath sounds: Normal breath sounds.   Abdominal:      General: There is no distension.   Musculoskeletal:      Comments: Left BKA, wrapped with clean dressing   Neurological:      General: No focal deficit present.      Mental Status: He is alert and oriented to person, place, and time.   Psychiatric:         Mood and Affect: Mood normal.         Fluids    Intake/Output Summary (Last 24 hours) at 2/3/2024 1332  Last data filed at 2/3/2024 0935  Gross per 24 hour   Intake 240 ml   Output 100 ml   Net 140 ml         Laboratory  Recent Labs     02/01/24  0227 02/02/24  0306 02/03/24  0240   WBC 10.2 11.2* 10.5   RBC 2.87* 2.90* 2.64*   HEMOGLOBIN 8.1* 8.2* 7.5*   HEMATOCRIT 24.7* 25.6* 23.9*   MCV 86.1 88.3 90.5   MCH 28.2 28.3 28.4   MCHC 32.8 32.0* 31.4*   RDW 43.5 44.2 45.7    PLATELETCT 233 188 146*   MPV 9.4 9.4 9.5       Recent Labs     02/01/24  0227 02/02/24  0306 02/03/24  0240   SODIUM 131* 128* 132*   POTASSIUM 3.6 3.5* 4.1   CHLORIDE 98 97 99   CO2 24 23 24   GLUCOSE 212* 231* 197*   BUN 27* 32* 21   CREATININE 3.13* 3.35* 2.60*   CALCIUM 6.5* 6.5* 6.6*                     Imaging  MR-FOOT-W/O LEFT   Final Result      1.  Marrow edema involving the fifth metatarsal head with also seen marrow edema and fragmentation of the fifth proximal phalanx and marrow edema involving the middle and distal phalanges. This most likely represents osteomyelitis with pathologic    fracture. There is surrounding cellulitis.      2.  Mild marrow edema involving the fourth metatarsal head and the fourth proximal phalanx possibly reactive in nature versus osteomyelitis.      3.  Edema/induration of the soft tissues of the forefoot, midfoot and hindfoot with also seen involvement of the intrinsic muscles of the foot likely representing cellulitis and myositis. There are punctate areas of gas within the soft tissues of the    forefoot and midfoot which may indicate areas of tissue necrosis.      4.  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      IR-SABA,GROSHONG PLACEMENT >5   Final Result      1. ULTRASOUND AND FLUOROSCOPIC GUIDED PLACEMENT OF A Right INTERNAL JUGULAR 14.5 [place] GLIDE PATH TUNNELED DIALYSIS CATHETER.      2. THE HEMODIALYSIS CATHETER MAY BE USED IMMEDIATELY AS CLINICALLY INDICATED. FLUSHES PER PROTOCOL.         US-FLOWER SINGLE LEVEL BILAT   Final Result      DX-CHEST-PORTABLE (1 VIEW)   Final Result      CT-FOOT W/O PLUS RECONS LEFT   Final Result      1.  Findings are consistent with osteomyelitis and pathologic fracture involving the proximal phalanx of the fifth digit.      2.  Soft tissue swelling and emphysema is present around the fifth digit and in the plantar soft tissues of the foot. These findings are consistent with cellulitis.      DX-FOOT-COMPLETE 3+ LEFT    Final Result      1.  Comminuted fracture proximal phalanx fifth digit is identified which could be pathologic and related to osteomyelitis.      2.  Soft tissue swelling and emphysema in the fifth digit which could be due to cellulitis.      DX-CHEST-PORTABLE (1 VIEW)   Final Result         No acute cardiac or pulmonary abnormality is identified.           Assessment/Plan  * Septic shock (HCC)- (present on admission)  Assessment & Plan  Resolved    Hypocalcemia- (present on admission)  Assessment & Plan  Ionized calcium is around 1  Asymptomatic for hypocalcemia  Check vitamin D level  On calcium oxalate    Soft tissue infection of foot- (present on admission)  Assessment & Plan  As above    Osteomyelitis and soft tissue infection of left foot (HCC)- (present on admission)  Assessment & Plan  Noted on CT scan and MRI.  Has extensive soft tissue infection with osteomyelitis.  Orthopedics on board, s/p left fifth ray amputation through metatarsal shaft with irrigation and debridement of left plantar foot abscess.  Orthopedics planning for BKA today 2/1/2024.  Completed antibiotics.  Post BKA, will have achieved adequate source control and has no further antibiotic needs.  Continue pain control.    Type 2 diabetes mellitus with kidney complication, with long-term current use of insulin ([facility])- (present on admission)  Assessment & Plan  HbA1c 7.2.  Continue sliding scale insulin coverage while in house.  Holding long-acting insulin given now that he is hemodialysis dependent and at risk for insulin stacking/accumulation.  Accu-Cheks before meals and at bedtime. Goal to keep BG between 140-180 per 2019 ADA guidelines.  Continue diabetic diet.      Acute renal failure superimposed on stage 3b chronic kidney disease (HCC)- (present on admission)  Assessment & Plan  Patient now started on hemodialysis 1/29/2024.  Nephrology following.  Outpatient hemodialysis chair arranged.      Normocytic anemia- (present on  admission)  Assessment & Plan  Likely component of chronic disease as well as chronic kidney disease  Hemoglobin stable at 8.1.   He has been getting erythropoietin during dialysis         VTE prophylaxis: Heparin subcu    I have performed a physical exam and reviewed and updated ROS and Plan today (2/3/2024). In review of yesterday's note (2/2/2024), there are no changes except as documented above.       Greater than 51 minutes spent preparing to see patient (e.g. review of tests) obtaining and/or reviewing separately obtained history. Performing a medically appropriate examination and/ evaluation.  Counseling and educating the patient/family/caregiver.  Ordering medications, tests, or procedures.  Referring and communicating with other health care professionals.  Documenting clinical information in EPIC.  Independently interpreting results and communicating results to patient/family/caregiver.  Care coordination.

## 2024-02-03 NOTE — FACE TO FACE
Face to Face Note  -  Durable Medical Equipment    Marcelo Sanders M.D. - NPI: [id number]  I certify that this patient is under my care and that they had a durable medical equipment(DME)face to face encounter by the clinical nurse specialist working collaboratively with me that meets the physician DME face-to-face encounter requirements with this patient on:    Date of encounter:   Patient:                    MRN:                       YOB: 2026  Beni Moore  [id number]  1970     The encounter with the patient was in whole, or in part, for the following medical condition, which is the primary reason for durable medical equipment:  Other - left BKA    I certify that, based on my findings, the following durable medical equipment is medically necessary:    Wheelchair   Patient needs manual wheelchair for use inside the home based on the above diagnosis. Per guidelines patient meets criteria in the following ways:   A.  Patient has significant impairment in the following Toileting, Feeding, Dressing, Grooming, and Bathing and is is unable to complete these tasks in a reasonable timeframe.   B.  The patient's mobility limitations cannot be sufficiently resolved by use of  fitted cane or walker.   C.  The patient reports his home provides adequate access between rooms,  maneuvering space, and surfaces for use of the manual wheelchair that is  provided.   D.  The use of the manual wheelchair will significantly improve the patient's  ability to participate in MRADLs and the patient will use it on a regular basis in  the home.   E.  The patient has not expressed an unwillingness to use the manual  wheelchair.   F. The patient has limitations of strength, endurance, range of motion, or coordination per OT notes:.    My Clinical findings support the need for the above equipment due to:  Other - Left BKA

## 2024-02-03 NOTE — PROGRESS NOTES
Orthopaedic Progress Note    Interval changes:  Patient doing well post op  LLE dressings are CDI  Cleared for DC home by ortho pending medicine clearance    ROS - Patient denies any new issues.  Pain well controlled.    /67   Pulse 79   Temp 36.1 °C (97 °F) (Temporal)   Resp 16   Ht 1.829 m (6')   Wt 99 kg (218 lb 4.1 oz)   SpO2 91%     Patient seen and examined  No acute distress  Breathing non labored  RRR  LLE stump protector in place, dressings CDI, no contracture noted.    Recent Labs     01/31/24  0830 02/01/24  0227 02/02/24  0306   WBC 12.1* 10.2 11.2*   RBC 2.91* 2.87* 2.90*   HEMOGLOBIN 8.3* 8.1* 8.2*   HEMATOCRIT 25.2* 24.7* 25.6*   MCV 86.6 86.1 88.3   MCH 28.5 28.2 28.3   MCHC 32.9 32.8 32.0*   RDW 44.0 43.5 44.2   PLATELETCT 250 233 188   MPV 9.3 9.4 9.4       Active Hospital Problems    Diagnosis     Hypocalcemia [E83.51]     Septic shock ([facility]) [A41.9, R65.21]     Soft tissue infection of foot [L08.9]     Unspecified fracture of left toe(s), initial encounter for open fracture [S92.912B]     Osteomyelitis and soft tissue infection of left foot ([facility]) [M86.9]     Type 2 diabetes mellitus with kidney complication, with long-term current use of insulin ([facility]) [E11.29, Z79.4]     Acute renal failure superimposed on stage 3b chronic kidney disease ([facility]) [N17.9, N18.32]     Normocytic anemia [D64.9]        Assessment/Plan:  Patient doing well post op  LLE dressings are CDI  Cleared for DC home by ortho pending medicine clearance  POD#1 S/P  Left below knee amputation  POD#3 S/P   1.  Left fifth ray amputation through metatarsal shaft    2. irrigation and debridement of left plantar foot abscess   Wt bearing status - WBAT  Wound care/Drains - Dressings to be changed every other day by nursing. Or PRN for saturation starting POD#2  Future Procedures - none planned    Sutures/Staples out- 14-21 days post operatively. Removal will completed by ortho mid levels  only.  PT/OT-initiated  Antibiotics: zyvox 600mg IV Q12, zosyn 4.5g IV Q12  DVT Prophylaxis- TEDS/SCDs/Foot pumps/heparin  Farrell-not needed per ortho  Case Coordination for Discharge Planning - Disposition per therapy recs.

## 2024-02-03 NOTE — CARE PLAN
The patient is Stable - Low risk of patient condition declining or worsening    Shift Goals  Clinical Goals: Pt will report pain less than 6/10 after pain intervention  Patient Goals: pain control      Progress made toward(s) clinical / shift goals:      Patient is not progressing towards the following goals:

## 2024-02-03 NOTE — PROGRESS NOTES
Hemodialysis ordered by Dr. Leon. Treatment started at 0759 and ended at 1059. Pt stable, vss, no c/o post tx. Net UF 1.0 L. Report to ZHENG Smith RN.

## 2024-02-03 NOTE — CARE PLAN
The patient is Stable - Low risk of patient condition declining or worsening    Shift Goals  Clinical Goals: mobilize; pain mgmt; monitor I/Os; monitor BG  Patient Goals: pain mgmt; mobility  Family Goals: n/a at this time    Progress made toward(s) clinical / shift goals:  progressing    Problem: Knowledge Deficit - Standard  Goal: Patient and family/care givers will demonstrate understanding of plan of care, disease process/condition, diagnostic tests and medications  Outcome: Progressing     Problem: Hemodynamics  Goal: Patient's hemodynamics, fluid balance and neurologic status will be stable or improve  Outcome: Progressing     Problem: Fluid Volume  Goal: Fluid volume balance will be maintained  Outcome: Progressing     Problem: Urinary - Renal Perfusion  Goal: Ability to achieve and maintain adequate renal perfusion and functioning will improve  Outcome: Progressing     Problem: Pain - Standard  Goal: Alleviation of pain or a reduction in pain to the patient’s comfort goal  Outcome: Progressing     Problem: Skin Integrity  Goal: Skin integrity is maintained or improved  Outcome: Progressing     Problem: Fall Risk  Goal: Patient will remain free from falls  Outcome: Progressing       Patient is not progressing towards the following goals:

## 2024-02-03 NOTE — DISCHARGE PLANNING
Case Management Discharge Planning    Admission Date: 1/25/2024  GMLOS: 9.9  ALOS: 9    6-Clicks ADL Score: 15  6-Clicks Mobility Score: 11  PT and/or OT Eval ordered: Yes  Post-acute Referrals Ordered: NA  Post-acute Choice Obtained: NA  Has referral(s) been sent to post-acute provider:  TJ      Anticipated Discharge Dispo: Discharge Disposition: D/T to home under [facility] care in anticipation of covered skilled care (06)  Discharge Address: [address] [place] [address]  Discharge Contact Phone Number: [phone]    DME Needed: Yes    DME Ordered: Yes Wheelchair referral sent to [facility] for [facility]    Action(s) Taken: Referral(s) sent and DME Face to Face     Escalations Completed: None    Medically Clear: No    Next Steps: Waiting for wheelchair delivery.      Barriers to Discharge: DME    Is the patient up for discharge tomorrow: Yes    Is transport arranged for discharge disposition: Yes Parents to transport patient home. Patient is still refusing  services.    15:22- RNCM spoke with Wallace of [facility]. They can deliver wheelchair to patients home on Monday. Hospitalist advised.

## 2024-02-03 NOTE — DISCHARGE PLANNING
Received choice form @: 1842  Agency/Facility name: [facility]  Sent referral per choice form @: 0606

## 2024-02-03 NOTE — CONSULTS
PM&R brief note     Consult received, therapy evals reviewed.   Patient desires to go home and PT/OT recommending home health.     Agree with therapy recommendatinos for HH.   Reviewed with primary team, Dr. Sanders. Ok to DC consult.     Shanna Quintero,    PM&R

## 2024-02-03 NOTE — PROGRESS NOTES
Rec'd report from day shift RN. Assumed pt care. Assessment completed. AA&OX4. Reports pain to LLE, medicated per MAR. No s/s of discomfort or distress. LLE is in immobilizer with dressing to new BKA, CDI. Pt adjusted in bed. Bed in lowest position, bed locked, treaded socks in place, RN and CNA numbers provided, call light within reach.

## 2024-02-03 NOTE — PROGRESS NOTES
Sheldon from Lab called with critical result of Calcium 6.6 at 0355. Critical lab result read back to Sheldon.   Dr. Susannah Martinez notified of critical lab result at 0403.  Critical lab result read back by Dr. Susannah Martinez.

## 2024-02-04 PROBLEM — D64.9 ANEMIA: Status: ACTIVE | Noted: 2024-02-04

## 2024-02-04 PROBLEM — D64.9 NORMOCYTIC ANEMIA: Status: RESOLVED | Noted: 2018-06-30 | Resolved: 2024-02-04

## 2024-02-04 LAB
ABO GROUP BLD: NORMAL
ALBUMIN SERPL BCP-MCNC: 1.5 G/DL (ref 3.2–4.9)
ALBUMIN/GLOB SERPL: 0.5 G/DL
ALP SERPL-CCNC: 56 U/L (ref 30–99)
ALT SERPL-CCNC: <5 U/L (ref 2–50)
ANION GAP SERPL CALC-SCNC: 8 MMOL/L (ref 7–16)
AST SERPL-CCNC: 14 U/L (ref 12–45)
BASOPHILS # BLD AUTO: 0.3 % (ref 0–1.8)
BASOPHILS # BLD AUTO: 0.5 % (ref 0–1.8)
BASOPHILS # BLD: 0.02 K/UL (ref 0–0.12)
BASOPHILS # BLD: 0.04 K/UL (ref 0–0.12)
BILIRUB SERPL-MCNC: 0.2 MG/DL (ref 0.1–1.5)
BLD GP AB SCN SERPL QL: NORMAL
BUN SERPL-MCNC: 26 MG/DL (ref 8–22)
CALCIUM ALBUM COR SERPL-MCNC: 8.5 MG/DL (ref 8.5–10.5)
CALCIUM SERPL-MCNC: 6.5 MG/DL (ref 8.5–10.5)
CHLORIDE SERPL-SCNC: 99 MMOL/L (ref 96–112)
CO2 SERPL-SCNC: 23 MMOL/L (ref 20–33)
CREAT SERPL-MCNC: 3.05 MG/DL (ref 0.5–1.4)
EOSINOPHIL # BLD AUTO: 0.14 K/UL (ref 0–0.51)
EOSINOPHIL # BLD AUTO: 0.18 K/UL (ref 0–0.51)
EOSINOPHIL NFR BLD: 1.8 % (ref 0–6.9)
EOSINOPHIL NFR BLD: 2.2 % (ref 0–6.9)
ERYTHROCYTE [DISTWIDTH] IN BLOOD BY AUTOMATED COUNT: 44.2 FL (ref 35.9–50)
ERYTHROCYTE [DISTWIDTH] IN BLOOD BY AUTOMATED COUNT: 45.2 FL (ref 35.9–50)
FERRITIN SERPL-MCNC: 622 NG/ML (ref 22–322)
GFR SERPLBLD CREATININE-BSD FMLA CKD-EPI: 23 ML/MIN/1.73 M 2
GLOBULIN SER CALC-MCNC: 3.3 G/DL (ref 1.9–3.5)
GLUCOSE BLD STRIP.AUTO-MCNC: 225 MG/DL (ref 65–99)
GLUCOSE BLD STRIP.AUTO-MCNC: 233 MG/DL (ref 65–99)
GLUCOSE BLD STRIP.AUTO-MCNC: 258 MG/DL (ref 65–99)
GLUCOSE BLD STRIP.AUTO-MCNC: 259 MG/DL (ref 65–99)
GLUCOSE SERPL-MCNC: 246 MG/DL (ref 65–99)
HCT VFR BLD AUTO: 21.9 % (ref 42–52)
HCT VFR BLD AUTO: 23.5 % (ref 42–52)
HGB BLD-MCNC: 6.9 G/DL (ref 14–18)
HGB BLD-MCNC: 7.4 G/DL (ref 14–18)
IMM GRANULOCYTES # BLD AUTO: 0.05 K/UL (ref 0–0.11)
IMM GRANULOCYTES # BLD AUTO: 0.06 K/UL (ref 0–0.11)
IMM GRANULOCYTES NFR BLD AUTO: 0.6 % (ref 0–0.9)
IMM GRANULOCYTES NFR BLD AUTO: 0.7 % (ref 0–0.9)
IRON SATN MFR SERPL: 15 % (ref 15–55)
IRON SERPL-MCNC: 17 UG/DL (ref 50–180)
LYMPHOCYTES # BLD AUTO: 1.85 K/UL (ref 1–4.8)
LYMPHOCYTES # BLD AUTO: 1.98 K/UL (ref 1–4.8)
LYMPHOCYTES NFR BLD: 23.7 % (ref 22–41)
LYMPHOCYTES NFR BLD: 23.9 % (ref 22–41)
MCH RBC QN AUTO: 27.8 PG (ref 27–33)
MCH RBC QN AUTO: 28.5 PG (ref 27–33)
MCHC RBC AUTO-ENTMCNC: 31.5 G/DL (ref 32.3–36.5)
MCHC RBC AUTO-ENTMCNC: 31.5 G/DL (ref 32.3–36.5)
MCV RBC AUTO: 88.3 FL (ref 81.4–97.8)
MCV RBC AUTO: 90.5 FL (ref 81.4–97.8)
MONOCYTES # BLD AUTO: 0.57 K/UL (ref 0–0.85)
MONOCYTES # BLD AUTO: 0.62 K/UL (ref 0–0.85)
MONOCYTES NFR BLD AUTO: 7.3 % (ref 0–13.4)
MONOCYTES NFR BLD AUTO: 7.5 % (ref 0–13.4)
NEUTROPHILS # BLD AUTO: 5.16 K/UL (ref 1.82–7.42)
NEUTROPHILS # BLD AUTO: 5.41 K/UL (ref 1.82–7.42)
NEUTROPHILS NFR BLD: 65.2 % (ref 44–72)
NEUTROPHILS NFR BLD: 66.3 % (ref 44–72)
NRBC # BLD AUTO: 0 K/UL
NRBC # BLD AUTO: 0 K/UL
NRBC BLD-RTO: 0 /100 WBC (ref 0–0.2)
NRBC BLD-RTO: 0 /100 WBC (ref 0–0.2)
PLATELET # BLD AUTO: 112 K/UL (ref 164–446)
PLATELET # BLD AUTO: 119 K/UL (ref 164–446)
PMV BLD AUTO: 9.6 FL (ref 9–12.9)
PMV BLD AUTO: 9.9 FL (ref 9–12.9)
POTASSIUM SERPL-SCNC: 4.1 MMOL/L (ref 3.6–5.5)
PROT SERPL-MCNC: 4.8 G/DL (ref 6–8.2)
RBC # BLD AUTO: 2.42 M/UL (ref 4.7–6.1)
RBC # BLD AUTO: 2.66 M/UL (ref 4.7–6.1)
RH BLD: NORMAL
SODIUM SERPL-SCNC: 130 MMOL/L (ref 135–145)
TIBC SERPL-MCNC: 113 UG/DL (ref 250–450)
UIBC SERPL-MCNC: 96 UG/DL (ref 110–370)
WBC # BLD AUTO: 7.8 K/UL (ref 4.8–10.8)
WBC # BLD AUTO: 8.3 K/UL (ref 4.8–10.8)

## 2024-02-04 PROCEDURE — 82962 GLUCOSE BLOOD TEST: CPT | Mod: 91

## 2024-02-04 PROCEDURE — P9016 RBC LEUKOCYTES REDUCED: HCPCS

## 2024-02-04 PROCEDURE — 86900 BLOOD TYPING SEROLOGIC ABO: CPT

## 2024-02-04 PROCEDURE — 85025 COMPLETE CBC W/AUTO DIFF WBC: CPT

## 2024-02-04 PROCEDURE — 80053 COMPREHEN METABOLIC PANEL: CPT

## 2024-02-04 PROCEDURE — 86850 RBC ANTIBODY SCREEN: CPT

## 2024-02-04 PROCEDURE — 36430 TRANSFUSION BLD/BLD COMPNT: CPT

## 2024-02-04 PROCEDURE — 30243N1 TRANSFUSION OF NONAUTOLOGOUS RED BLOOD CELLS INTO CENTRAL VEIN, PERCUTANEOUS APPROACH: ICD-10-PCS | Performed by: STUDENT IN AN ORGANIZED HEALTH CARE EDUCATION/TRAINING PROGRAM

## 2024-02-04 PROCEDURE — 700111 HCHG RX REV CODE 636 W/ 250 OVERRIDE (IP): Performed by: STUDENT IN AN ORGANIZED HEALTH CARE EDUCATION/TRAINING PROGRAM

## 2024-02-04 PROCEDURE — A9270 NON-COVERED ITEM OR SERVICE: HCPCS | Performed by: INTERNAL MEDICINE

## 2024-02-04 PROCEDURE — 83540 ASSAY OF IRON: CPT

## 2024-02-04 PROCEDURE — 700102 HCHG RX REV CODE 250 W/ 637 OVERRIDE(OP): Performed by: INTERNAL MEDICINE

## 2024-02-04 PROCEDURE — 86901 BLOOD TYPING SEROLOGIC RH(D): CPT

## 2024-02-04 PROCEDURE — 83550 IRON BINDING TEST: CPT

## 2024-02-04 PROCEDURE — 86923 COMPATIBILITY TEST ELECTRIC: CPT

## 2024-02-04 PROCEDURE — 770006 HCHG ROOM/CARE - MED/SURG/GYN SEMI*

## 2024-02-04 PROCEDURE — 82728 ASSAY OF FERRITIN: CPT

## 2024-02-04 PROCEDURE — 700102 HCHG RX REV CODE 250 W/ 637 OVERRIDE(OP): Performed by: STUDENT IN AN ORGANIZED HEALTH CARE EDUCATION/TRAINING PROGRAM

## 2024-02-04 PROCEDURE — A9270 NON-COVERED ITEM OR SERVICE: HCPCS | Performed by: STUDENT IN AN ORGANIZED HEALTH CARE EDUCATION/TRAINING PROGRAM

## 2024-02-04 PROCEDURE — 99232 SBSQ HOSP IP/OBS MODERATE 35: CPT | Performed by: INTERNAL MEDICINE

## 2024-02-04 PROCEDURE — 99233 SBSQ HOSP IP/OBS HIGH 50: CPT | Performed by: STUDENT IN AN ORGANIZED HEALTH CARE EDUCATION/TRAINING PROGRAM

## 2024-02-04 PROCEDURE — 36415 COLL VENOUS BLD VENIPUNCTURE: CPT

## 2024-02-04 RX ADMIN — OXYCODONE 5 MG: 5 TABLET ORAL at 06:03

## 2024-02-04 RX ADMIN — INSULIN GLARGINE-YFGN 15 UNITS: 100 INJECTION, SOLUTION SUBCUTANEOUS at 18:01

## 2024-02-04 RX ADMIN — ACETAMINOPHEN 1000 MG: 500 TABLET ORAL at 06:03

## 2024-02-04 RX ADMIN — ACETAMINOPHEN 1000 MG: 500 TABLET ORAL at 18:04

## 2024-02-04 RX ADMIN — ACETAMINOPHEN 1000 MG: 500 TABLET ORAL at 12:37

## 2024-02-04 RX ADMIN — HEPARIN SODIUM 5000 UNITS: 5000 INJECTION, SOLUTION INTRAVENOUS; SUBCUTANEOUS at 13:31

## 2024-02-04 RX ADMIN — OXYCODONE HYDROCHLORIDE 10 MG: 10 TABLET ORAL at 18:52

## 2024-02-04 RX ADMIN — HEPARIN SODIUM 5000 UNITS: 5000 INJECTION, SOLUTION INTRAVENOUS; SUBCUTANEOUS at 06:04

## 2024-02-04 RX ADMIN — ASPIRIN 81 MG: 81 TABLET, COATED ORAL at 06:02

## 2024-02-04 RX ADMIN — OMEPRAZOLE 20 MG: 20 CAPSULE, DELAYED RELEASE ORAL at 06:03

## 2024-02-04 RX ADMIN — CALCIUM 500 MG: 500 TABLET ORAL at 07:28

## 2024-02-04 RX ADMIN — CHOLESTYRAMINE 4 G: 4 POWDER, FOR SUSPENSION ORAL at 18:04

## 2024-02-04 RX ADMIN — HEPARIN SODIUM 5000 UNITS: 5000 INJECTION, SOLUTION INTRAVENOUS; SUBCUTANEOUS at 21:22

## 2024-02-04 RX ADMIN — CHOLESTYRAMINE 4 G: 4 POWDER, FOR SUSPENSION ORAL at 06:02

## 2024-02-04 RX ADMIN — CALCIUM 500 MG: 500 TABLET ORAL at 18:04

## 2024-02-04 RX ADMIN — ATORVASTATIN CALCIUM 80 MG: 40 TABLET, FILM COATED ORAL at 21:21

## 2024-02-04 NOTE — PROGRESS NOTES
Nephrology Daily Progress Note    Date of Service  2/4/2024    Chief Complaint  53 y.o. 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    Interval Problem Update   No overnight events.  Physical Exam  Temp:  [35.7 °C (96.2 °F)-37.6 °C (99.6 °F)] 36.3 °C (97.3 °F)  Pulse:  [71-77] 71  Resp:  [16-18] 18  BP: (131-145)/(67-75) 143/67  SpO2:  [95 %-98 %] 96 %      GEN: alert and oriented in no acute distress.  HEENT: moist oropharygneal mucous membranes  CV:RRR, normal s1 s2  PULM: clear to auscultation bilaterally  ABD: soft non tender non distended  EXT: warm well perfused, pitting lower extremity edema   ACCESS: R IJ TDC  Fluids    Intake/Output Summary (Last 24 hours) at 2/4/2024 1454  Last data filed at 2/4/2024 1330  Gross per 24 hour   Intake 1000 ml   Output 2000 ml   Net -1000 ml         Laboratory  Labs reviewed, pertinent labs below.  Recent Labs     02/02/24  0306 02/03/24  0240 02/04/24  0309   WBC 11.2* 10.5 7.8   RBC 2.90* 2.64* 2.42*   HEMOGLOBIN 8.2* 7.5* 6.9*   HEMATOCRIT 25.6* 23.9* 21.9*   MCV 88.3 90.5 90.5   MCH 28.3 28.4 28.5   MCHC 32.0* 31.4* 31.5*   RDW 44.2 45.7 45.2   PLATELETCT 188 146* 112*   MPV 9.4 9.5 9.9       Recent Labs     02/02/24  0306 02/03/24  0240 02/03/24  1300 02/04/24  0309   SODIUM 128* 132*  --  130*   POTASSIUM 3.5* 4.1 4.1 4.1   CHLORIDE 97 99  --  99   CO2 23 24  --  23   GLUCOSE 231* 197*  --  246*   BUN 32* 21  --  26*   CREATININE 3.35* 2.60*  --  3.05*   CALCIUM 6.5* 6.6*  --  6.5*                   URINALYSIS:  Lab Results   Component Value Date/Time    COLORURINE Yellow 01/09/2024 2142    CLARITY Clear 01/09/2024 2142    SPECGRAVITY 1.013 01/09/2024 2142    PHURINE 5.0 01/09/2024 2142    KETONES Negative 01/09/2024 2142    PROTEINURIN 300 (A) 01/09/2024 2142    BILIRUBINUR Negative 01/09/2024 2142    UROBILU 0.2  01/09/2024 2142    NITRITE Negative 01/09/2024 2142    LEUKESTERAS Negative 01/09/2024 2142    OCCULTBLOOD Small (A) 01/09/2024 2142     [facility]  Lab Results   Component Value Date/Time    TOTPROTUR 258.0 (H) 05/03/2023 1210      Lab Results   Component Value Date/Time    CREATININEU 27.57 05/03/2023 1210         Imaging interpreted by radiologist. Imaging reports reviewed with pertinent findings below  MR-FOOT-W/O LEFT   Final Result      1.  Marrow edema involving the fifth metatarsal head with also seen marrow edema and fragmentation of the fifth proximal phalanx and marrow edema involving the middle and distal phalanges. This most likely represents osteomyelitis with pathologic    fracture. There is surrounding cellulitis.      2.  Mild marrow edema involving the fourth metatarsal head and the fourth proximal phalanx possibly reactive in nature versus osteomyelitis.      3.  Edema/induration of the soft tissues of the forefoot, midfoot and hindfoot with also seen involvement of the intrinsic muscles of the foot likely representing cellulitis and myositis. There are punctate areas of gas within the soft tissues of the    forefoot and midfoot which may indicate areas of tissue necrosis.      4.  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      IR-SABA,GROSHONG PLACEMENT >5   Final Result      1. ULTRASOUND AND FLUOROSCOPIC GUIDED PLACEMENT OF A Right INTERNAL JUGULAR 14.5 Thai GLIDE PATH TUNNELED DIALYSIS CATHETER.      2. THE HEMODIALYSIS CATHETER MAY BE USED IMMEDIATELY AS CLINICALLY INDICATED. FLUSHES PER PROTOCOL.         US-FLOWER SINGLE LEVEL BILAT   Final Result      DX-CHEST-PORTABLE (1 VIEW)   Final Result      CT-FOOT W/O PLUS RECONS LEFT   Final Result      1.  Findings are consistent with osteomyelitis and pathologic fracture involving the proximal phalanx of the fifth digit.      2.  Soft tissue swelling and emphysema is present around the fifth digit and in the plantar soft tissues of the  foot. These findings are consistent with cellulitis.      DX-FOOT-COMPLETE 3+ LEFT   Final Result      1.  Comminuted fracture proximal phalanx fifth digit is identified which could be pathologic and related to osteomyelitis.      2.  Soft tissue swelling and emphysema in the fifth digit which could be due to cellulitis.      DX-CHEST-PORTABLE (1 VIEW)   Final Result         No acute cardiac or pulmonary abnormality is identified.            Current Facility-Administered Medications   Medication Dose Route Frequency Provider Last Rate Last Admin    insulin GLARGINE (Lantus,Semglee) injection  15 Units Subcutaneous Q EVENING Marcelo Sanders M.D.        calcium carbonate (Os-Quinton 500) tablet 500 mg  500 mg Oral BID WITH MEALS Marcelo Sanders M.D.   500 mg at 02/04/24 0728    lidocaine (Xylocaine) 1 % injection 1 mL  1 mL Intradermal PRN Rosmery Leon M.D.        Pharmacy Consult Request ...Pain Management Review 1 Each  1 Each Other PHARMACY TO DOSE Larry Rodriguez M.D.        acetaminophen (Tylenol) tablet 1,000 mg  1,000 mg Oral Q6HRS Larry Rodriguez M.D.   1,000 mg at 02/04/24 1237    Followed by    [START ON 2/6/2024] acetaminophen (Tylenol) tablet 1,000 mg  1,000 mg Oral Q6HRS PRN Larry Rodriguez M.D.        oxyCODONE immediate-release (Roxicodone) tablet 5 mg  5 mg Oral Q3HRS PRN Larry Rodriguez M.D.   5 mg at 02/04/24 0603    Or    oxyCODONE immediate release (Roxicodone) tablet 10 mg  10 mg Oral Q3HRS PRN Larry Rodriguez M.D.   10 mg at 02/03/24 0935    Or    HYDROmorphone (Dilaudid) injection 0.5 mg  0.5 mg Intravenous Q3HRS PRN Larry Rodriguez M.D.   0.5 mg at 02/02/24 0312    heparin injection 5,000 Units  5,000 Units Subcutaneous Q8HRS Sam Givens M.D.   5,000 Units at 02/04/24 1331    heparin injection 3,600 Units  3,600 Units Intracatheter DIALYSIS PRN Rosmery Leon M.D.   3,600 Units at 02/03/24 1545    insulin regular (HumuLIN R,NovoLIN R) injection  3-14 Units Subcutaneous 4X/DAY JOHN RAMIREZ  JESENIA Mcbride   7 Units at 02/04/24 1240    And    dextrose 50% (D50W) injection 25 g  25 g Intravenous Q15 MIN PRN Alejandro Mcbride M.D.        NS (Bolus) 0.9 % infusion 250 mL  250 mL Intravenous DIALYSIS PRN Varun Ortiz M.D.        epoetin (Retacrit) 5,000 Units injection (Dialysis Use Only)  5,000 Units Intravenous MO, WE + FR Varun Ortiz M.D.   5,000 Units at 02/02/24 1038    LORazepam (Ativan) injection 1 mg  1 mg Intravenous Q6HRS PRN Alejandro Mcbride M.D.   1 mg at 01/27/24 1556    atorvastatin (Lipitor) tablet 80 mg  80 mg Oral Q EVENING Tino Romano Jr., D.O.   80 mg at 02/03/24 2116    omeprazole (PriLOSEC) capsule 20 mg  20 mg Oral QAM Tino Romano Jr., D.O.   20 mg at 02/04/24 0603    senna-docusate (Pericolace Or Senokot S) 8.6-50 MG per tablet 2 Tablet  2 Tablet Oral BID Tino Romano Jr., D.O.   2 Tablet at 01/30/24 0435    And    polyethylene glycol/lytes (Miralax) Packet 1 Packet  1 Packet Oral QDAY PRN Tino Romano Jr., D.OAsiya        And    bisacodyl (Dulcolax) suppository 10 mg  10 mg Rectal QDAY PRN Tino Romano Jr., D.O.        ondansetron (Zofran) syringe/vial injection 4 mg  4 mg Intravenous Q4HRS PRN Tino Romano Jr., D.O.        ondansetron (Zofran ODT) dispertab 4 mg  4 mg Oral Q4HRS PRN Tino Romano Jr., D.O.        promethazine (Phenergan) tablet 12.5-25 mg  12.5-25 mg Oral Q4HRS PRN Tino Romano Jr. D.O.        promethazine (Phenergan) suppository 12.5-25 mg  12.5-25 mg Rectal Q4HRS PRN Tino Romano Jr., D.O.        prochlorperazine (Compazine) injection 5-10 mg  5-10 mg Intravenous Q4HRS PRN Tino Romano Jr. D.OAsiya        LR (Bolus) infusion 500 mL  500 mL Intravenous Once PRN JD Doss Jr..O.        aspirin EC tablet 81 mg  81 mg Oral DAILY JD oDss Jr..O.   81 mg at 02/04/24 0602    cholestyramine (Questran) 4 GM powder 4 g  1 Packet Oral BID Tino Romano Jr., D.O.   4 g at 02/04/24 0602       Kidney biopsy 3/29/2022 interpreted by   Yashira  Diagnosis: Chronic glomerular microangiopathic changes.  Nodular diabetic glomerulosclerosis.  Comment: In light of the clinical history, the microangiopathic changes are likely secondary to anti-VEGF inhibitor use.  Other causes of chronic thrombotic microangiopathy are also on the differential.  There are no acute thrombi.  Light microscopy notable for approximately 50% interstitial fibrosis and tubular atrophy as well as evidence of acute tubular injury      Assessment/Plan  53 y.o. 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     1.  Progression of CKD to ESRD.  CKD due to biopsy-proven diabetic nephropathy and chronic thrombotic microangiopathy. Continue  RRT on MWF. Anticipate next HD on 2/5/24  Awaiting discharge. Confirmed at [facility].    2.  Hyponatremia, persistent.  Recommend 1 L fluid restriction.  Check sodium level daily. To be addressed with UF as above.      3.  Metabolic acidosis, improving. due to advanced chronic disease.  Remains off of bicarbonate infusion.  To be addressed with hemodialysis as above.        4.  Azotemia, with high BUN and uremic symptoms.  Recommend dialysis initiation as above.  Continue to monitor with BMP.     5.  Left diabetic foot infection placated by osteomyelitis of the left metatarsal head.  Appreciate orthopedic surgery recommendations with plan for fifth ray amputation through metatarsal shaft, irrigation debridement of left plantar foot abscess on 1/30/2024      6.  Anemia of chronic kidney disease-continue Epogen with hemodialysis Monday Wednesday Friday.  Transfuse packed red blood cells to maintain hemoglobin greater than 7.       Rosmery Leon MD  Nephrology  [facility]

## 2024-02-04 NOTE — PROGRESS NOTES
[facility] Progress Note       PUF today x 2 hours per Dr. Leon.  Tx initiated at 1345 and ended at 1545    NET UF: 1000 ml    Tx tolerated; UFG met. No complications encountered during the treatment. Blood returned, ports flushed with NS. Heparin 1000 units/ml used to lock catheter per designated amount. CVC ports clamped and capped. Aspirate heparin prior to next CVC use. See eflow sheets for further details.    Report given to primary RN

## 2024-02-04 NOTE — PROGRESS NOTES
Lab called with critical result of Calcium at 6.5. Critical lab result read back to lab.   Dr. Martinez notified of critical lab result at 1402.  Critical lab result read back by Dr. Martinez.

## 2024-02-04 NOTE — ASSESSMENT & PLAN NOTE
Transfuse 1 unit PRBC to keep hemoglobin above 7  Need close monitoring for low H&H.  Currently no sign of active sign of bleeding.  Anemia likely from CKD.  Repeat iron panel    2/5/2024  No concern for underlying iron deficiency anemia  H&H remained stable now stable  Recent Labs     02/06/24  0230 02/06/24  0316 02/07/24  0209   HEMOGLOBIN 5.0* 7.3* 9.2*   HEMATOCRIT 15.6* 22.0* 27.4*   MCV 90.2 87.0 87.3   MCH 28.9 28.9 29.3   PLATELETCT 156* 132* 133*     Reviewed iron panel

## 2024-02-04 NOTE — CARE PLAN
The patient is Stable - Low risk of patient condition declining or worsening    Shift Goals  Clinical Goals: pain management; wheel chair delivery; dressing change  Patient Goals: wheel chair delivery; dressing change  Family Goals: n/a at this time    Progress made toward(s) clinical / shift goals:  progressing    Problem: Knowledge Deficit - Standard  Goal: Patient and family/care givers will demonstrate understanding of plan of care, disease process/condition, diagnostic tests and medications  Outcome: Progressing     Problem: Hemodynamics  Goal: Patient's hemodynamics, fluid balance and neurologic status will be stable or improve  Outcome: Progressing     Problem: Fluid Volume  Goal: Fluid volume balance will be maintained  Outcome: Progressing     Problem: Urinary - Renal Perfusion  Goal: Ability to achieve and maintain adequate renal perfusion and functioning will improve  Outcome: Progressing     Problem: Respiratory  Goal: Patient will achieve/maintain optimum respiratory ventilation and gas exchange  Outcome: Progressing     Problem: Physical Regulation  Goal: Diagnostic test results will improve  Outcome: Progressing     Problem: Physical Regulation  Goal: Signs and symptoms of infection will decrease  Outcome: Progressing     Problem: Pain - Standard  Goal: Alleviation of pain or a reduction in pain to the patient’s comfort goal  Outcome: Progressing     Problem: Skin Integrity  Goal: Skin integrity is maintained or improved  Outcome: Progressing     Problem: Fall Risk  Goal: Patient will remain free from falls  Outcome: Progressing       Patient is not progressing towards the following goals:

## 2024-02-04 NOTE — PROGRESS NOTES
Assumed pt care with RN. Pt is A&OX4 and states he is in 3/10 pain but declines interventions at this time. Plan of care discussed, no further questions. HD port and CVC in place. Personal belongings and call light within reach, bed alarm on.

## 2024-02-04 NOTE — PROGRESS NOTES
Orthopaedic Progress Note    Interval changes:  Patient doing well    LLE dressings changed incision without issues  Cleared for DC home by ortho pending medicine clearance    ROS - Patient denies any new issues.  Pain well controlled.    BP (!) 145/75   Pulse 72   Temp 36.1 °C (97 °F) (Temporal)   Resp 18   Ht 1.829 m (6')   Wt 115 kg (253 lb 4.9 oz)   SpO2 98%     Patient seen and examined  No acute distress  Breathing non labored  RRR  LLE stump protector in place, dressings changed incision without issues, dressings CDI, no contracture noted.    Recent Labs     02/02/24  0306 02/03/24  0240 02/04/24  0309   WBC 11.2* 10.5 7.8   RBC 2.90* 2.64* 2.42*   HEMOGLOBIN 8.2* 7.5* 6.9*   HEMATOCRIT 25.6* 23.9* 21.9*   MCV 88.3 90.5 90.5   MCH 28.3 28.4 28.5   MCHC 32.0* 31.4* 31.5*   RDW 44.2 45.7 45.2   PLATELETCT 188 146* 112*   MPV 9.4 9.5 9.9       Active Hospital Problems    Diagnosis     Hypocalcemia [E83.51]     Septic shock ([facility]) [A41.9, R65.21]     Soft tissue infection of foot [L08.9]     Unspecified fracture of left toe(s), initial encounter for open fracture [S92.912B]     Osteomyelitis and soft tissue infection of left foot ([facility]) [M86.9]     Type 2 diabetes mellitus with kidney complication, with long-term current use of insulin ([facility]) [E11.29, Z79.4]     Acute renal failure superimposed on stage 3b chronic kidney disease ([facility]) [N17.9, N18.32]     Normocytic anemia [D64.9]        Assessment/Plan:  Patient doing well    LLE dressings changed incision without issues  Cleared for DC home by ortho pending medicine clearance  POD#3 S/P  Left below knee amputation  POD#5 S/P   1.  Left fifth ray amputation through metatarsal shaft    2. irrigation and debridement of left plantar foot abscess   Wt bearing status - WBAT  Wound care/Drains - Dressings to be changed every other day by nursing. Or PRN for saturation starting POD#2  Future Procedures - none planned   Sutures/Staples out- 14-21 days post  operatively. Removal will completed by ortho mid levels only.  PT/OT-initiated  Antibiotics: copmleted  DVT Prophylaxis- TEDS/SCDs/Foot pumps/heparin  Farrell-not needed per ortho  Case Coordination for Discharge Planning - Disposition per therapy recs.

## 2024-02-04 NOTE — PROGRESS NOTES
Nephrology Daily Progress Note    Date of Service  2/4/2024    Chief Complaint  53 y.o. 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    Interval Problem Update   No overnight events.  Physical Exam  Temp:  [35.7 °C (96.2 °F)-37.6 °C (99.6 °F)] 36.3 °C (97.3 °F)  Pulse:  [71-77] 71  Resp:  [16-18] 18  BP: (131-145)/(67-75) 143/67  SpO2:  [95 %-98 %] 96 %      GEN: alert and oriented in no acute distress.  HEENT: moist oropharygneal mucous membranes  CV:RRR, normal s1 s2  PULM: clear to auscultation bilaterally  ABD: soft non tender non distended  EXT: warm well perfused, no lower extremity edema   ACCESS: R IJ TDC  Fluids    Intake/Output Summary (Last 24 hours) at 2/4/2024 1444  Last data filed at 2/4/2024 1330  Gross per 24 hour   Intake 1000 ml   Output 2000 ml   Net -1000 ml         Laboratory  Labs reviewed, pertinent labs below.  Recent Labs     02/02/24  0306 02/03/24  0240 02/04/24  0309   WBC 11.2* 10.5 7.8   RBC 2.90* 2.64* 2.42*   HEMOGLOBIN 8.2* 7.5* 6.9*   HEMATOCRIT 25.6* 23.9* 21.9*   MCV 88.3 90.5 90.5   MCH 28.3 28.4 28.5   MCHC 32.0* 31.4* 31.5*   RDW 44.2 45.7 45.2   PLATELETCT 188 146* 112*   MPV 9.4 9.5 9.9       Recent Labs     02/02/24  0306 02/03/24  0240 02/03/24  1300 02/04/24  0309   SODIUM 128* 132*  --  130*   POTASSIUM 3.5* 4.1 4.1 4.1   CHLORIDE 97 99  --  99   CO2 23 24  --  23   GLUCOSE 231* 197*  --  246*   BUN 32* 21  --  26*   CREATININE 3.35* 2.60*  --  3.05*   CALCIUM 6.5* 6.6*  --  6.5*                   URINALYSIS:  Lab Results   Component Value Date/Time    COLORURINE Yellow 01/09/2024 2142    CLARITY Clear 01/09/2024 2142    SPECGRAVITY 1.013 01/09/2024 2142    PHURINE 5.0 01/09/2024 2142    KETONES Negative 01/09/2024 2142    PROTEINURIN 300 (A) 01/09/2024 2142    BILIRUBINUR Negative 01/09/2024 2142    UROBILU 0.2 01/09/2024  2142    NITRITE Negative 01/09/2024 2142    LEUKESTERAS Negative 01/09/2024 2142    OCCULTBLOOD Small (A) 01/09/2024 2142     [facility] [place]   Component Value Date/Time    TOTPROTUR 258.0 (H) 05/03/2023 1210      Lab Results   Component Value Date/Time    CREATININEU 27.57 05/03/2023 1210         Imaging interpreted by radiologist. Imaging reports reviewed with pertinent findings below  MR-FOOT-W/O LEFT   Final Result      1.  Marrow edema involving the fifth metatarsal head with also seen marrow edema and fragmentation of the fifth proximal phalanx and marrow edema involving the middle and distal phalanges. This most likely represents osteomyelitis with pathologic    fracture. There is surrounding cellulitis.      2.  Mild marrow edema involving the fourth metatarsal head and the fourth proximal phalanx possibly reactive in nature versus osteomyelitis.      3.  Edema/induration of the soft tissues of the forefoot, midfoot and hindfoot with also seen involvement of the intrinsic muscles of the foot likely representing cellulitis and myositis. There are punctate areas of gas within the soft tissues of the    forefoot and midfoot which may indicate areas of tissue necrosis.      4.  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      IR-SABA,GROSHONG PLACEMENT >5   Final Result      1. ULTRASOUND AND FLUOROSCOPIC GUIDED PLACEMENT OF A Right INTERNAL JUGULAR 14.5 [place]ese GLIDE PATH TUNNELED DIALYSIS CATHETER.      2. THE HEMODIALYSIS CATHETER MAY BE USED IMMEDIATELY AS CLINICALLY INDICATED. FLUSHES PER PROTOCOL.         US-FLOWER SINGLE LEVEL BILAT   Final Result      DX-CHEST-PORTABLE (1 VIEW)   Final Result      CT-FOOT W/O PLUS RECONS LEFT   Final Result      1.  Findings are consistent with osteomyelitis and pathologic fracture involving the proximal phalanx of the fifth digit.      2.  Soft tissue swelling and emphysema is present around the fifth digit and in the plantar soft tissues of the foot. These  findings are consistent with cellulitis.      DX-FOOT-COMPLETE 3+ LEFT   Final Result      1.  Comminuted fracture proximal phalanx fifth digit is identified which could be pathologic and related to osteomyelitis.      2.  Soft tissue swelling and emphysema in the fifth digit which could be due to cellulitis.      DX-CHEST-PORTABLE (1 VIEW)   Final Result         No acute cardiac or pulmonary abnormality is identified.            Current Facility-Administered Medications   Medication Dose Route Frequency Provider Last Rate Last Admin    insulin GLARGINE (Lantus,Semglee) injection  15 Units Subcutaneous Q EVENING Marcelo Sanders M.D.        calcium carbonate (Os-Quinton 500) tablet 500 mg  500 mg Oral BID WITH MEALS Marcelo Sanders M.D.   500 mg at 02/04/24 0728    lidocaine (Xylocaine) 1 % injection 1 mL  1 mL Intradermal PRN Rosmery Leon M.D.        Pharmacy Consult Request ...Pain Management Review 1 Each  1 Each Other PHARMACY TO DOSE Larry Rodriguez M.D.        acetaminophen (Tylenol) tablet 1,000 mg  1,000 mg Oral Q6HRS Larry Rodriguez M.D.   1,000 mg at 02/04/24 1237    Followed by    [START ON 2/6/2024] acetaminophen (Tylenol) tablet 1,000 mg  1,000 mg Oral Q6HRS PRN Larry Rodriguez M.D.        oxyCODONE immediate-release (Roxicodone) tablet 5 mg  5 mg Oral Q3HRS PRN Larry Rodriguez M.D.   5 mg at 02/04/24 0603    Or    oxyCODONE immediate release (Roxicodone) tablet 10 mg  10 mg Oral Q3HRS PRN Larry Rodriguez M.D.   10 mg at 02/03/24 0935    Or    HYDROmorphone (Dilaudid) injection 0.5 mg  0.5 mg Intravenous Q3HRS PRN Larry Rodriguez M.D.   0.5 mg at 02/02/24 0312    heparin injection 5,000 Units  5,000 Units Subcutaneous Q8HRS Sam Givens M.D.   5,000 Units at 02/04/24 1331    heparin injection 3,600 Units  3,600 Units Intracatheter DIALYSIS PRN Rosmery Leon M.D.   3,600 Units at 02/03/24 1545    insulin regular (HumuLIN R,NovoLIN R) injection  3-14 Units Subcutaneous 4X/DAY JOHN Mcbride M.D.   7  Units at 02/04/24 1240    And    dextrose 50% (D50W) injection 25 g  25 g Intravenous Q15 MIN PRN Alejandro Mcbride M.D.        NS (Bolus) 0.9 % infusion 250 mL  250 mL Intravenous DIALYSIS PRN Varun Ortiz M.D.        epoetin (Retacrit) 5,000 Units injection (Dialysis Use Only)  5,000 Units Intravenous MO, WE + FR Varun Ortiz M.D.   5,000 Units at 02/02/24 1038    LORazepam (Ativan) injection 1 mg  1 mg Intravenous Q6HRS PRN Alejandro Mcbride M.D.   1 mg at 01/27/24 1556    atorvastatin (Lipitor) tablet 80 mg  80 mg Oral Q EVENING Tino Romano Jr., D.O.   80 mg at 02/03/24 2116    omeprazole (PriLOSEC) capsule 20 mg  20 mg Oral QAM Tino Romano Jr., D.O.   20 mg at 02/04/24 0603    senna-docusate (Pericolace Or Senokot S) 8.6-50 MG per tablet 2 Tablet  2 Tablet Oral BID Tino Romano Jr., D.O.   2 Tablet at 01/30/24 0435    And    polyethylene glycol/lytes (Miralax) Packet 1 Packet  1 Packet Oral QDAY PRN Tino Romano Jr., D.OAsiya        And    bisacodyl (Dulcolax) suppository 10 mg  10 mg Rectal QDAY PRN Tino Romano Jr., D.O.        ondansetron (Zofran) syringe/vial injection 4 mg  4 mg Intravenous Q4HRS PRN Tino Romano Jr., D.O.        ondansetron (Zofran ODT) dispertab 4 mg  4 mg Oral Q4HRS PRN Tino Romano Jr., D.O.        promethazine (Phenergan) tablet 12.5-25 mg  12.5-25 mg Oral Q4HRS PRN Tino Romano Jr., JD.O.        promethazine (Phenergan) suppository 12.5-25 mg  12.5-25 mg Rectal Q4HRS PRN Tino Romano Jr., D.O.        prochlorperazine (Compazine) injection 5-10 mg  5-10 mg Intravenous Q4HRS PRN JD Doss Jr..OAsiya        LR (Bolus) infusion 500 mL  500 mL Intravenous Once PRN JD Doss Jr..O.        aspirin EC tablet 81 mg  81 mg Oral DAILY Tino Romano Jr., D.O.   81 mg at 02/04/24 0602    cholestyramine (Questran) 4 GM powder 4 g  1 Packet Oral BID Tino Romano Jr., D.O.   4 g at 02/04/24 0602       Kidney biopsy 3/29/2022 interpreted by Dr. Ac  Diagnosis:  Chronic glomerular microangiopathic changes.  Nodular diabetic glomerulosclerosis.  Comment: In light of the clinical history, the microangiopathic changes are likely secondary to anti-VEGF inhibitor use.  Other causes of chronic thrombotic microangiopathy are also on the differential.  There are no acute thrombi.  Light microscopy notable for approximately 50% interstitial fibrosis and tubular atrophy as well as evidence of acute tubular injury      Assessment/Plan  53 y.o. 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     1.  Progression of CKD to ESRD.  CKD due to biopsy-proven diabetic nephropathy and chronic thrombotic microangiopathy. Continue  RRT on MWF. Anticipate next HD on 2/2/24    2.  Hyponatremia, persistent.  Recommend 1 L fluid restriction.  Check sodium level daily.     3.  Metabolic acidosis, improving. due to advanced chronic disease.  Remains off of bicarbonate infusion.  To be addressed with hemodialysis as above.        4.  Azotemia, with high BUN and uremic symptoms.  Recommend dialysis initiation as above.  Continue to monitor with BMP.     5.  Left diabetic foot infection placated by osteomyelitis of the left metatarsal head.  Appreciate orthopedic surgery recommendations with plan for fifth ray amputation through metatarsal shaft, irrigation debridement of left plantar foot abscess on 1/30/2024      6.  Anemia of chronic kidney disease-continue Epogen with hemodialysis Monday Wednesday Friday.  Transfuse packed red blood cells to maintain hemoglobin greater than 7.    Plan of care discussed with Dr. Eldon Leon MD  Nephrology  [facility]

## 2024-02-04 NOTE — CARE PLAN
The patient is Stable - Low risk of patient condition declining or worsening    Shift Goals  Clinical Goals: pain management to comfort level, rest  Patient Goals: rest, pain control  Family Goals: JENNIFER    Progress made toward(s) clinical / shift goals:        Problem: Knowledge Deficit - Standard  Goal: Patient and family/care givers will demonstrate understanding of plan of care, disease process/condition, diagnostic tests and medications  Outcome: Progressing     Problem: Pain - Standard  Goal: Alleviation of pain or a reduction in pain to the patient’s comfort goal  Outcome: Progressing       Patient is not progressing towards the following goals:

## 2024-02-04 NOTE — PROGRESS NOTES
Nephrology Daily Progress Note    Date of Service  2/4/2024    Chief Complaint  53 y.o. 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    Subjective - no overnight events. Anticipated discharge home soon    Interval Problem Update   No overnight events.  Physical Exam  Temp:  [35.7 °C (96.2 °F)-37.6 °C (99.6 °F)] 36.3 °C (97.3 °F)  Pulse:  [71-77] 71  Resp:  [16-18] 18  BP: (131-145)/(67-75) 143/67  SpO2:  [95 %-98 %] 96 %      GEN: alert and oriented in no acute distress.  HEENT: moist oropharygneal mucous membranes  CV:RRR, normal s1 s2  PULM: clear to auscultation bilaterally  ABD: soft non tender non distended  EXT: warm well perfused, pitting lower extremity edema   ACCESS: R IJ TDC  Fluids    Intake/Output Summary (Last 24 hours) at 2/4/2024 1455  Last data filed at 2/4/2024 1330  Gross per 24 hour   Intake 1000 ml   Output 2000 ml   Net -1000 ml         Laboratory  Labs reviewed, pertinent labs below.  Recent Labs     02/02/24  0306 02/03/24  0240 02/04/24  0309   WBC 11.2* 10.5 7.8   RBC 2.90* 2.64* 2.42*   HEMOGLOBIN 8.2* 7.5* 6.9*   HEMATOCRIT 25.6* 23.9* 21.9*   MCV 88.3 90.5 90.5   MCH 28.3 28.4 28.5   MCHC 32.0* 31.4* 31.5*   RDW 44.2 45.7 45.2   PLATELETCT 188 146* 112*   MPV 9.4 9.5 9.9       Recent Labs     02/02/24  0306 02/03/24  0240 02/03/24  1300 02/04/24  0309   SODIUM 128* 132*  --  130*   POTASSIUM 3.5* 4.1 4.1 4.1   CHLORIDE 97 99  --  99   CO2 23 24  --  23   GLUCOSE 231* 197*  --  246*   BUN 32* 21  --  26*   CREATININE 3.35* 2.60*  --  3.05*   CALCIUM 6.5* 6.6*  --  6.5*                   URINALYSIS:  Lab Results   Component Value Date/Time    COLORURINE Yellow 01/09/2024 2142    CLARITY Clear 01/09/2024 2142    SPECGRAVITY 1.013 01/09/2024 2142    PHURINE 5.0 01/09/2024 2142    KETONES Negative 01/09/2024 2142    PROTEINURIN 300 (A) 01/09/2024  2142    BILIRUBINUR Negative 01/09/2024 2142    UROBILU 0.2 01/09/2024 2142    NITRITE Negative 01/09/2024 2142    LEUKESTERAS Negative 01/09/2024 2142    OCCULTBLOOD Small (A) 01/09/2024 2142     [facility] [place]  Lab Results   Component Value Date/Time    TOTPROTUR 258.0 (H) 05/03/2023 1210      Lab Results   Component Value Date/Time    CREATININEU 27.57 05/03/2023 1210         Imaging interpreted by radiologist. Imaging reports reviewed with pertinent findings below  MR-FOOT-W/O LEFT   Final Result      1.  Marrow edema involving the fifth metatarsal head with also seen marrow edema and fragmentation of the fifth proximal phalanx and marrow edema involving the middle and distal phalanges. This most likely represents osteomyelitis with pathologic    fracture. There is surrounding cellulitis.      2.  Mild marrow edema involving the fourth metatarsal head and the fourth proximal phalanx possibly reactive in nature versus osteomyelitis.      3.  Edema/induration of the soft tissues of the forefoot, midfoot and hindfoot with also seen involvement of the intrinsic muscles of the foot likely representing cellulitis and myositis. There are punctate areas of gas within the soft tissues of the    forefoot and midfoot which may indicate areas of tissue necrosis.      4.  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      IR-SABA,GROSHONG PLACEMENT >5   Final Result      1. ULTRASOUND AND FLUOROSCOPIC GUIDED PLACEMENT OF A Right INTERNAL JUGULAR 14.5 [place] GLIDE PATH TUNNELED DIALYSIS CATHETER.      2. THE HEMODIALYSIS CATHETER MAY BE USED IMMEDIATELY AS CLINICALLY INDICATED. FLUSHES PER PROTOCOL.         US-FLOWER SINGLE LEVEL BILAT   Final Result      DX-CHEST-PORTABLE (1 VIEW)   Final Result      CT-FOOT W/O PLUS RECONS LEFT   Final Result      1.  Findings are consistent with osteomyelitis and pathologic fracture involving the proximal phalanx of the fifth digit.      2.  Soft tissue swelling and emphysema is present  around the fifth digit and in the plantar soft tissues of the foot. These findings are consistent with cellulitis.      DX-FOOT-COMPLETE 3+ LEFT   Final Result      1.  Comminuted fracture proximal phalanx fifth digit is identified which could be pathologic and related to osteomyelitis.      2.  Soft tissue swelling and emphysema in the fifth digit which could be due to cellulitis.      DX-CHEST-PORTABLE (1 VIEW)   Final Result         No acute cardiac or pulmonary abnormality is identified.            Current Facility-Administered Medications   Medication Dose Route Frequency Provider Last Rate Last Admin    insulin GLARGINE (Lantus,Semglee) injection  15 Units Subcutaneous Q EVENING Marcelo Sanders M.D.        calcium carbonate (Os-Quinton 500) tablet 500 mg  500 mg Oral BID WITH MEALS Marcelo Sanders M.D.   500 mg at 02/04/24 0728    lidocaine (Xylocaine) 1 % injection 1 mL  1 mL Intradermal PRN Rosmery Leon M.D.        Pharmacy Consult Request ...Pain Management Review 1 Each  1 Each Other PHARMACY TO DOSE Larry Rodriguez M.D.        acetaminophen (Tylenol) tablet 1,000 mg  1,000 mg Oral Q6HRS Larry Rodriguez M.D.   1,000 mg at 02/04/24 1237    Followed by    [START ON 2/6/2024] acetaminophen (Tylenol) tablet 1,000 mg  1,000 mg Oral Q6HRS PRN Larry Rodriguez M.D.        oxyCODONE immediate-release (Roxicodone) tablet 5 mg  5 mg Oral Q3HRS PRN Larry Rodriguez M.D.   5 mg at 02/04/24 0603    Or    oxyCODONE immediate release (Roxicodone) tablet 10 mg  10 mg Oral Q3HRS PRN Larry Rodriguez M.D.   10 mg at 02/03/24 0935    Or    HYDROmorphone (Dilaudid) injection 0.5 mg  0.5 mg Intravenous Q3HRS PRN Larry Rodriguez M.D.   0.5 mg at 02/02/24 0312    heparin injection 5,000 Units  5,000 Units Subcutaneous Q8HRS Sam Givens M.D.   5,000 Units at 02/04/24 1331    heparin injection 3,600 Units  3,600 Units Intracatheter DIALYSIS PRN Rosmery Leon M.D.   3,600 Units at 02/03/24 1545    insulin regular (HumuLIN R,NovoLIN R)  injection  3-14 Units Subcutaneous 4X/DAY ACHS Alejandro Mcbride M.D.   7 Units at 02/04/24 1240    And    dextrose 50% (D50W) injection 25 g  25 g Intravenous Q15 MIN PRN Alejandro Mcbride M.D.        NS (Bolus) 0.9 % infusion 250 mL  250 mL Intravenous DIALYSIS PRN Varun Ortiz M.D.        epoetin (Retacrit) 5,000 Units injection (Dialysis Use Only)  5,000 Units Intravenous MO, WE + FR Varun Ortiz M.D.   5,000 Units at 02/02/24 1038    LORazepam (Ativan) injection 1 mg  1 mg Intravenous Q6HRS PRN Alejandro Mcbride M.D.   1 mg at 01/27/24 1556    atorvastatin (Lipitor) tablet 80 mg  80 mg Oral Q EVENING Tino Romano Jr., D.O.   80 mg at 02/03/24 2116    omeprazole (PriLOSEC) capsule 20 mg  20 mg Oral QAM Tino Romano Jr., D.O.   20 mg at 02/04/24 0603    senna-docusate (Pericolace Or Senokot S) 8.6-50 MG per tablet 2 Tablet  2 Tablet Oral BID Tino Romano Jr., D.O.   2 Tablet at 01/30/24 0435    And    polyethylene glycol/lytes (Miralax) Packet 1 Packet  1 Packet Oral QDAY PRN Tino Romano Jr., D.OAsiya        And    bisacodyl (Dulcolax) suppository 10 mg  10 mg Rectal QDAY PRN Tino Romano Jr., D.O.        ondansetron (Zofran) syringe/vial injection 4 mg  4 mg Intravenous Q4HRS PRN JD Doss Jr..O.        ondansetron (Zofran ODT) dispertab 4 mg  4 mg Oral Q4HRS PRN JD Doss Jr..O.        promethazine (Phenergan) tablet 12.5-25 mg  12.5-25 mg Oral Q4HRS PRN JD Doss Jr..O.        promethazine (Phenergan) suppository 12.5-25 mg  12.5-25 mg Rectal Q4HRS PRN Tino Romano Jr., JD.O.        prochlorperazine (Compazine) injection 5-10 mg  5-10 mg Intravenous Q4HRS PRN Tino Romano Jr., D.O.        LR (Bolus) infusion 500 mL  500 mL Intravenous Once PRN Tino Romano Jr., D.O.        aspirin EC tablet 81 mg  81 mg Oral DAILY Tino Romano Jr., D.O.   81 mg at 02/04/24 0602    cholestyramine (Questran) 4 GM powder 4 g  1 Packet Oral BID JD Doss Jr..OAsiya   4 g at 02/04/24  0602       Kidney biopsy 3/29/2022 interpreted by Dr. Ac  Diagnosis: Chronic glomerular microangiopathic changes.  Nodular diabetic glomerulosclerosis.  Comment: In light of the clinical history, the microangiopathic changes are likely secondary to anti-VEGF inhibitor use.  Other causes of chronic thrombotic microangiopathy are also on the differential.  There are no acute thrombi.  Light microscopy notable for approximately 50% interstitial fibrosis and tubular atrophy as well as evidence of acute tubular injury      Assessment/Plan  53 y.o. 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     1.  Progression of CKD to ESRD.  CKD due to biopsy-proven diabetic nephropathy and chronic thrombotic microangiopathy. Continue  RRT on MWF. Anticipate next HD on 2/5/24  Anticipate next HD on 2/5/24. Awaiting discharge. Confirmed at [facility].  2.  Hyponatremia, persistent.  Recommend 1 L fluid restriction.  Check sodium level daily.     3.  Metabolic acidosis, improving. due to advanced chronic disease.  Remains off of bicarbonate infusion.  To be addressed with hemodialysis as above.        4.  Azotemia, with high BUN and uremic symptoms.  Recommend dialysis initiation as above.  Continue to monitor with BMP.     5.  Left diabetic foot infection placated by osteomyelitis of the left metatarsal head.  Appreciate orthopedic surgery recommendations with plan for fifth ray amputation through metatarsal shaft, irrigation debridement of left plantar foot abscess on 1/30/2024      6.  Anemia of chronic kidney disease-continue Epogen with hemodialysis Monday Wednesday Friday.  Transfuse packed red blood cells to maintain hemoglobin greater than 7.    Plan of care discussed with Dr. Eldon Leon MD  Nephrology  [facility]

## 2024-02-04 NOTE — PROGRESS NOTES
Hospital Medicine Daily Progress Note    Date of Service  2/4/2024    Chief Complaint  Beni Moore is a 53 y.o. male admitted 1/25/2024 with toe infection    Hospital Course     53 y.o. 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  X-ray revealed pathologic fracture and was consistent with osteomyelitis with gas in the soft tissues.  WBC count was 24.7 thousand.  His creatinine was quite elevated at 6.36.  He was hypotensive.  He was placed on IV Zosyn and Zyvox, along with IV fluids.  Orthopedics and nephrology were consulted.  Septic shock has improved, and patient subsequently underwent left fifth ray amputation through metatarsal shaft with irrigation and debridement of left plantar foot abscess. Due to ongoing elevated creatinine, a tunneled dialysis catheter was placed and patient started on hemodialysis on 1/31/2024.  Surgical cultures remain negative.  Blood cultures remain negative.  Patient underwent through left BKA on 2/1/2024 by Dr. Segura.      Interval Problem Update      2/4/2024  Vitals remained stable  Remain asymptomatic  Denies melena, hematochezia, hematemesis  Labs reviewed noted hemoglobin 6.9, sodium 130, glucose 246, elevated BUN/creatinine  Chart reviewed, meds reviewed    Transfuse 1 unit PRBC to keep hemoglobin above 7  Uncontrolled blood glucose level, started on Lantus  On IV narcotics for pain management, monitor for toxicity  Repeat BMP in a.m. to monitor electrolytes and renal function   repeat CBC in a.m. to monitor white count and hemoglobin.  PT/OT recommending further evaluation.     Patient been refusing SNF/home health and likes to go home.   Wheelchair has been ordered.  Discussed with daughter regarding home health and wheelchair.    Need close monitoring for low H&H.  Currently no sign of active sign of bleeding.  Anemia likely from CKD.  Repeat iron panel    I have discussed this patient's plan of care and discharge plan at IDT rounds today with Case Management, Nursing, Nursing leadership, and other members of the IDT team.    Consultants/Specialty  nephrology and orthopedics    Code Status  Full Code    Disposition  The patient is not medically cleared for discharge to home or a post-acute facility.  Anticipate discharge to: home with close outpatient follow-up    I have placed the appropriate orders for post-discharge needs.    Review of Systems  Review of Systems   Musculoskeletal:         Left BKA site pain        Physical Exam  Temp:  [35.7 °C (96.2 °F)-37.6 °C (99.6 °F)] 36.1 °C (97 °F)  Pulse:  [71-77] 72  Resp:  [16-18] 18  BP: (131-145)/(72-75) 145/75  SpO2:  [95 %-98 %] 98 %    Physical Exam  Constitutional:       Appearance: Normal appearance.   HENT:      Mouth/Throat:      Mouth: Mucous membranes are dry.   Cardiovascular:      Rate and Rhythm: Normal rate and regular rhythm.   Pulmonary:      Effort: Pulmonary effort is normal.      Breath sounds: Normal breath sounds.   Abdominal:      General: There is no distension.   Musculoskeletal:      Comments: Left BKA, wrapped with clean dressing   Neurological:      General: No focal deficit present.      Mental Status: He is alert and oriented to person, place, and time.   Psychiatric:         Mood and Affect: Mood normal.         Fluids    Intake/Output Summary (Last 24 hours) at 2/4/2024 1150  Last data filed at 2/4/2024 0600  Gross per 24 hour   Intake 500 ml   Output 2000 ml   Net -1500 ml         Laboratory  Recent Labs     02/02/24  0306 02/03/24  0240 02/04/24  0309   WBC 11.2* 10.5 7.8   RBC 2.90* 2.64* 2.42*   HEMOGLOBIN 8.2* 7.5* 6.9*   HEMATOCRIT 25.6* 23.9* 21.9*   MCV 88.3 90.5  90.5   MCH 28.3 28.4 28.5   MCHC 32.0* 31.4* 31.5*   RDW 44.2 45.7 45.2   PLATELETCT 188 146* 112*   MPV 9.4 9.5 9.9       Recent Labs     02/02/24  0306 02/03/24  0240 02/03/24  1300 02/04/24  0309   SODIUM 128* 132*  --  130*   POTASSIUM 3.5* 4.1 4.1 4.1   CHLORIDE 97 99  --  99   CO2 23 24  --  23   GLUCOSE 231* 197*  --  246*   BUN 32* 21  --  26*   CREATININE 3.35* 2.60*  --  3.05*   CALCIUM 6.5* 6.6*  --  6.5*                     Imaging  MR-FOOT-W/O LEFT   Final Result      1.  Marrow edema involving the fifth metatarsal head with also seen marrow edema and fragmentation of the fifth proximal phalanx and marrow edema involving the middle and distal phalanges. This most likely represents osteomyelitis with pathologic    fracture. There is surrounding cellulitis.      2.  Mild marrow edema involving the fourth metatarsal head and the fourth proximal phalanx possibly reactive in nature versus osteomyelitis.      3.  Edema/induration of the soft tissues of the forefoot, midfoot and hindfoot with also seen involvement of the intrinsic muscles of the foot likely representing cellulitis and myositis. There are punctate areas of gas within the soft tissues of the    forefoot and midfoot which may indicate areas of tissue necrosis.      4.  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      IR-SABA,GROSHONG PLACEMENT >5   Final Result      1. ULTRASOUND AND FLUOROSCOPIC GUIDED PLACEMENT OF A Right INTERNAL JUGULAR 14.5 [place] GLIDE PATH TUNNELED DIALYSIS CATHETER.      2. THE HEMODIALYSIS CATHETER MAY BE USED IMMEDIATELY AS CLINICALLY INDICATED. FLUSHES PER PROTOCOL.         US-FLOWER SINGLE LEVEL BILAT   Final Result      DX-CHEST-PORTABLE (1 VIEW)   Final Result      CT-FOOT W/O PLUS RECONS LEFT   Final Result      1.  Findings are consistent with osteomyelitis and pathologic fracture involving the proximal phalanx of the fifth digit.      2.  Soft tissue swelling and emphysema is present around the fifth  digit and in the plantar soft tissues of the foot. These findings are consistent with cellulitis.      DX-FOOT-COMPLETE 3+ LEFT   Final Result      1.  Comminuted fracture proximal phalanx fifth digit is identified which could be pathologic and related to osteomyelitis.      2.  Soft tissue swelling and emphysema in the fifth digit which could be due to cellulitis.      DX-CHEST-PORTABLE (1 VIEW)   Final Result         No acute cardiac or pulmonary abnormality is identified.           Assessment/Plan  * Septic shock (HCC)- (present on admission)  Assessment & Plan  Resolved    Anemia  Assessment & Plan  Transfuse 1 unit PRBC to keep hemoglobin above 7  Need close monitoring for low H&H.  Currently no sign of active sign of bleeding.  Anemia likely from CKD.  Repeat iron panel    Hypocalcemia- (present on admission)  Assessment & Plan  Ionized calcium is around 1  Asymptomatic for hypocalcemia  Check vitamin D level  On calcium oxalate    Soft tissue infection of foot- (present on admission)  Assessment & Plan  As above    Osteomyelitis and soft tissue infection of left foot (HCC)- (present on admission)  Assessment & Plan  Noted on CT scan and MRI.  Has extensive soft tissue infection with osteomyelitis.  Orthopedics on board, s/p left fifth ray amputation through metatarsal shaft with irrigation and debridement of left plantar foot abscess.  Orthopedics planning for BKA today 2/1/2024.  Completed antibiotics.  Post BKA, will have achieved adequate source control and has no further antibiotic needs.  Continue pain control.    Type 2 diabetes mellitus with kidney complication, with long-term current use of insulin ([facility])- (present on admission)  Assessment & Plan  HbA1c 7.2.  Continue sliding scale insulin coverage while in house.  Holding long-acting insulin given now that he is hemodialysis dependent and at risk for insulin stacking/accumulation.  Accu-Cheks before meals and at bedtime. Goal to keep BG between  140-180 per 2019 ADA guidelines.  Continue diabetic diet.      Acute renal failure superimposed on stage 3b chronic kidney disease (HCC)- (present on admission)  Assessment & Plan  Patient now started on hemodialysis 1/29/2024.  Nephrology following.  Outpatient hemodialysis chair arranged.           VTE prophylaxis: Heparin subcu    I have performed a physical exam and reviewed and updated ROS and Plan today (2/4/2024). In review of yesterday's note (2/3/2024), there are no changes except as documented above.       Greater than 51 minutes spent preparing to see patient (e.g. review of tests) obtaining and/or reviewing separately obtained history. Performing a medically appropriate examination and/ evaluation.  Counseling and educating the patient/family/caregiver.  Ordering medications, tests, or procedures.  Referring and communicating with other health care professionals.  Documenting clinical information in EPIC.  Independently interpreting results and communicating results to patient/family/caregiver.  Care coordination.

## 2024-02-05 LAB
ANION GAP SERPL CALC-SCNC: 7 MMOL/L (ref 7–16)
BASOPHILS # BLD AUTO: 0.6 % (ref 0–1.8)
BASOPHILS # BLD: 0.05 K/UL (ref 0–0.12)
BUN SERPL-MCNC: 32 MG/DL (ref 8–22)
CALCIUM SERPL-MCNC: 6.6 MG/DL (ref 8.5–10.5)
CHLORIDE SERPL-SCNC: 98 MMOL/L (ref 96–112)
CO2 SERPL-SCNC: 23 MMOL/L (ref 20–33)
CREAT SERPL-MCNC: 3.32 MG/DL (ref 0.5–1.4)
EOSINOPHIL # BLD AUTO: 0.18 K/UL (ref 0–0.51)
EOSINOPHIL NFR BLD: 2.3 % (ref 0–6.9)
ERYTHROCYTE [DISTWIDTH] IN BLOOD BY AUTOMATED COUNT: 43.5 FL (ref 35.9–50)
GFR SERPLBLD CREATININE-BSD FMLA CKD-EPI: 21 ML/MIN/1.73 M 2
GLUCOSE BLD STRIP.AUTO-MCNC: 162 MG/DL (ref 65–99)
GLUCOSE BLD STRIP.AUTO-MCNC: 187 MG/DL (ref 65–99)
GLUCOSE BLD STRIP.AUTO-MCNC: 192 MG/DL (ref 65–99)
GLUCOSE BLD STRIP.AUTO-MCNC: 212 MG/DL (ref 65–99)
GLUCOSE SERPL-MCNC: 191 MG/DL (ref 65–99)
HCT VFR BLD AUTO: 24.3 % (ref 42–52)
HGB BLD-MCNC: 7.9 G/DL (ref 14–18)
IMM GRANULOCYTES # BLD AUTO: 0.06 K/UL (ref 0–0.11)
IMM GRANULOCYTES NFR BLD AUTO: 0.8 % (ref 0–0.9)
LYMPHOCYTES # BLD AUTO: 2.2 K/UL (ref 1–4.8)
LYMPHOCYTES NFR BLD: 27.7 % (ref 22–41)
MCH RBC QN AUTO: 28.4 PG (ref 27–33)
MCHC RBC AUTO-ENTMCNC: 32.5 G/DL (ref 32.3–36.5)
MCV RBC AUTO: 87.4 FL (ref 81.4–97.8)
MONOCYTES # BLD AUTO: 0.55 K/UL (ref 0–0.85)
MONOCYTES NFR BLD AUTO: 6.9 % (ref 0–13.4)
NEUTROPHILS # BLD AUTO: 4.91 K/UL (ref 1.82–7.42)
NEUTROPHILS NFR BLD: 61.7 % (ref 44–72)
NRBC # BLD AUTO: 0 K/UL
NRBC BLD-RTO: 0 /100 WBC (ref 0–0.2)
PLATELET # BLD AUTO: 128 K/UL (ref 164–446)
PMV BLD AUTO: 9.9 FL (ref 9–12.9)
POTASSIUM SERPL-SCNC: 4.2 MMOL/L (ref 3.6–5.5)
RBC # BLD AUTO: 2.78 M/UL (ref 4.7–6.1)
SODIUM SERPL-SCNC: 128 MMOL/L (ref 135–145)
WBC # BLD AUTO: 8 K/UL (ref 4.8–10.8)

## 2024-02-05 PROCEDURE — 700102 HCHG RX REV CODE 250 W/ 637 OVERRIDE(OP): Performed by: INTERNAL MEDICINE

## 2024-02-05 PROCEDURE — 770006 HCHG ROOM/CARE - MED/SURG/GYN SEMI*

## 2024-02-05 PROCEDURE — 36415 COLL VENOUS BLD VENIPUNCTURE: CPT

## 2024-02-05 PROCEDURE — 700102 HCHG RX REV CODE 250 W/ 637 OVERRIDE(OP): Performed by: STUDENT IN AN ORGANIZED HEALTH CARE EDUCATION/TRAINING PROGRAM

## 2024-02-05 PROCEDURE — 80048 BASIC METABOLIC PNL TOTAL CA: CPT

## 2024-02-05 PROCEDURE — 700111 HCHG RX REV CODE 636 W/ 250 OVERRIDE (IP): Mod: JZ,JG | Performed by: INTERNAL MEDICINE

## 2024-02-05 PROCEDURE — A9270 NON-COVERED ITEM OR SERVICE: HCPCS | Performed by: STUDENT IN AN ORGANIZED HEALTH CARE EDUCATION/TRAINING PROGRAM

## 2024-02-05 PROCEDURE — 85025 COMPLETE CBC W/AUTO DIFF WBC: CPT

## 2024-02-05 PROCEDURE — 90935 HEMODIALYSIS ONE EVALUATION: CPT

## 2024-02-05 PROCEDURE — 99233 SBSQ HOSP IP/OBS HIGH 50: CPT | Performed by: STUDENT IN AN ORGANIZED HEALTH CARE EDUCATION/TRAINING PROGRAM

## 2024-02-05 PROCEDURE — 90935 HEMODIALYSIS ONE EVALUATION: CPT | Performed by: INTERNAL MEDICINE

## 2024-02-05 PROCEDURE — 99223 1ST HOSP IP/OBS HIGH 75: CPT | Performed by: PHYSICAL MEDICINE & REHABILITATION

## 2024-02-05 PROCEDURE — 700111 HCHG RX REV CODE 636 W/ 250 OVERRIDE (IP): Performed by: STUDENT IN AN ORGANIZED HEALTH CARE EDUCATION/TRAINING PROGRAM

## 2024-02-05 PROCEDURE — A9270 NON-COVERED ITEM OR SERVICE: HCPCS | Performed by: INTERNAL MEDICINE

## 2024-02-05 PROCEDURE — 97530 THERAPEUTIC ACTIVITIES: CPT

## 2024-02-05 PROCEDURE — 82962 GLUCOSE BLOOD TEST: CPT

## 2024-02-05 PROCEDURE — 700111 HCHG RX REV CODE 636 W/ 250 OVERRIDE (IP)

## 2024-02-05 RX ORDER — CARVEDILOL 3.12 MG/1
3.12 TABLET ORAL 2 TIMES DAILY WITH MEALS
Qty: 180 TABLET | Refills: 3 | Status: ON HOLD | OUTPATIENT
Start: 2024-02-05 | End: 2024-02-21

## 2024-02-05 RX ORDER — INSULIN DEGLUDEC 100 U/ML
20 INJECTION, SOLUTION SUBCUTANEOUS EVERY EVENING
Qty: 3 ML | Refills: 2 | Status: SHIPPED | OUTPATIENT
Start: 2024-02-05 | End: 2024-02-05

## 2024-02-05 RX ORDER — CALCIUM CARBONATE 500(1250)
500 TABLET ORAL 2 TIMES DAILY WITH MEALS
Qty: 60 TABLET | Refills: 0 | Status: ON HOLD | OUTPATIENT
Start: 2024-02-05 | End: 2024-02-21

## 2024-02-05 RX ORDER — INSULIN DEGLUDEC 100 U/ML
15 INJECTION, SOLUTION SUBCUTANEOUS EVERY EVENING
Qty: 3 ML | Refills: 2 | Status: SHIPPED | OUTPATIENT
Start: 2024-02-05 | End: 2024-02-05

## 2024-02-05 RX ORDER — HEPARIN SODIUM 1000 [USP'U]/ML
INJECTION, SOLUTION INTRAVENOUS; SUBCUTANEOUS
Status: COMPLETED
Start: 2024-02-05 | End: 2024-02-05

## 2024-02-05 RX ADMIN — ACETAMINOPHEN 1000 MG: 500 TABLET ORAL at 11:40

## 2024-02-05 RX ADMIN — HEPARIN SODIUM 5000 UNITS: 5000 INJECTION, SOLUTION INTRAVENOUS; SUBCUTANEOUS at 05:38

## 2024-02-05 RX ADMIN — HEPARIN SODIUM 3600 UNITS: 1000 INJECTION, SOLUTION INTRAVENOUS; SUBCUTANEOUS at 11:32

## 2024-02-05 RX ADMIN — OXYCODONE HYDROCHLORIDE 10 MG: 10 TABLET ORAL at 20:25

## 2024-02-05 RX ADMIN — CHOLESTYRAMINE 4 G: 4 POWDER, FOR SUSPENSION ORAL at 05:38

## 2024-02-05 RX ADMIN — HEPARIN SODIUM 5000 UNITS: 5000 INJECTION, SOLUTION INTRAVENOUS; SUBCUTANEOUS at 14:50

## 2024-02-05 RX ADMIN — ASPIRIN 81 MG: 81 TABLET, COATED ORAL at 05:38

## 2024-02-05 RX ADMIN — HEPARIN SODIUM 5000 UNITS: 5000 INJECTION, SOLUTION INTRAVENOUS; SUBCUTANEOUS at 20:26

## 2024-02-05 RX ADMIN — INSULIN GLARGINE-YFGN 15 UNITS: 100 INJECTION, SOLUTION SUBCUTANEOUS at 17:06

## 2024-02-05 RX ADMIN — OMEPRAZOLE 20 MG: 20 CAPSULE, DELAYED RELEASE ORAL at 05:38

## 2024-02-05 RX ADMIN — CALCIUM 500 MG: 500 TABLET ORAL at 16:43

## 2024-02-05 RX ADMIN — EPOETIN ALFA-EPBX 5000 UNITS: 3000 INJECTION, SOLUTION INTRAVENOUS; SUBCUTANEOUS at 11:15

## 2024-02-05 RX ADMIN — CHOLESTYRAMINE 4 G: 4 POWDER, FOR SUSPENSION ORAL at 18:00

## 2024-02-05 RX ADMIN — OXYCODONE HYDROCHLORIDE 10 MG: 10 TABLET ORAL at 03:25

## 2024-02-05 RX ADMIN — ATORVASTATIN CALCIUM 80 MG: 40 TABLET, FILM COATED ORAL at 20:25

## 2024-02-05 RX ADMIN — ACETAMINOPHEN 1000 MG: 500 TABLET ORAL at 05:38

## 2024-02-05 ASSESSMENT — GAIT ASSESSMENTS: GAIT LEVEL OF ASSIST: UNABLE TO PARTICIPATE

## 2024-02-05 ASSESSMENT — COGNITIVE AND FUNCTIONAL STATUS - GENERAL
SUGGESTED CMS G CODE MODIFIER MOBILITY: CM
CLIMB 3 TO 5 STEPS WITH RAILING: TOTAL
STANDING UP FROM CHAIR USING ARMS: A LOT
MOBILITY SCORE: 9
WALKING IN HOSPITAL ROOM: TOTAL
TURNING FROM BACK TO SIDE WHILE IN FLAT BAD: A LOT
MOVING FROM LYING ON BACK TO SITTING ON SIDE OF FLAT BED: UNABLE
MOVING TO AND FROM BED TO CHAIR: A LOT

## 2024-02-05 NOTE — PROGRESS NOTES
3hr HD started @ 0827 and completed @ 1128,tx well tolerated,VSS,net UF = 3000ml.RIJ TDC dressing CDI.Report given to Yamilet Medina RN.

## 2024-02-05 NOTE — CARE PLAN
The patient is Watcher - Medium risk of patient condition declining or worsening    Shift Goals  Clinical Goals: HD today and DC home  Patient Goals: DC today  Family Goals: JENNIFER    Progress made toward(s) clinical / shift goals:  PT is Aox4. On RA. L BKA, dressing C/D/I. HD today with 3L off. L IJ CVC in place. R chest TDC in place. Discharge cancelled for today. Pending wheelchair delivery to room as well. No acute distress noted. Pt remains of falls.     Patient is not progressing towards the following goals:

## 2024-02-05 NOTE — DISCHARGE SUMMARY
Discharge Summary    CHIEF COMPLAINT ON ADMISSION  Chief Complaint   Patient presents with    Wound Check    Blood Pressure Problem       Reason for Admission  EMS     Admission Date  1/25/2024    CODE STATUS  Full Code    HPI & HOSPITAL COURSE     53 y.o. 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  X-ray revealed pathologic fracture and was consistent with osteomyelitis with gas in the soft tissues.  WBC count was 24.7 thousand.  His creatinine was quite elevated at 6.36.  He was hypotensive.  He was placed on IV Zosyn and Zyvox, along with IV fluids.  Orthopedics and nephrology were consulted.  Septic shock has improved, and patient subsequently underwent left fifth ray amputation through metatarsal shaft with irrigation and debridement of left plantar foot abscess.  Completed course of antibiotic, no further antibiotic required due to adequate source control.  Due to ongoing elevated creatinine, a tunneled dialysis catheter was placed and patient started on hemodialysis on 1/31/2024.  Surgical cultures remain negative.  Blood cultures remain negative.  Patient underwent through left BKA on 2/1/2024 by Dr. Segura.  Patient requiring 1 unit PRBC for anemia likely due to blood loss from surgery and CKD.  Patient declined SNF, home health.  Reports he has someone coming at home for prostatic leg.  Wheelchair has been ordered.    On the day of discharge patient was seen and examined at bedside.  Vital signs stable, on room air saturating over 90%, no discomfort.     Labs reviewed H&H remained stable around 7.9, chemistry with chronic hyponatremia sodium 138.  Asymptomatic hypocalcemia.   Insulin detemir adjusted.  Case was discussed with nephrology cleared for discharge from nephrology standpoint with outpatient hemodialysis.  He will go for dialysis on Monday Wednesday Friday.  Orthopedics cleared for discharge.  He will follow-up with Ortho.  Discharge    At the time of discharge patient remain hemodynamically stable ,asymptomatic ,labs remain unremarkable .  Advised to repeat labs with dialysis on Wednesday.  Patient will be discharged with close follow up with PCP .Discharge plan was discussed with patient in details .  Patient agreed with discharge plan  and  all questions answered.              Therefore, he is discharged in good and stable condition to home with close outpatient follow-up.    The patient met 2-midnight criteria for an inpatient stay at the time of discharge.    Discharge Date  2/5/2024\          DISCHARGE DIAGNOSES  Principal Problem:    Septic shock (HCC) (POA: Yes)  Active Problems:    Acute renal failure superimposed on stage 3b chronic kidney disease (HCC) (POA: Yes)    Type 2 diabetes mellitus with kidney complication, with long-term current use of insulin (HCC) (POA: Yes)    Osteomyelitis and soft tissue infection of left foot (HCC) (POA: Yes)    Soft tissue infection of foot (POA: Yes)    Unspecified fracture of left toe(s), initial encounter for open fracture (POA: Yes)    Hypocalcemia (POA: Yes)    Anemia (POA: Unknown)  Resolved Problems:    Normocytic anemia (POA: Yes)      FOLLOW UP  Future Appointments   Date Time Provider Department Center   5/20/2024  1:20 PM JESENIA Alves M.D.  [address] [facility] [address]  [phone]    Follow up in 1 week(s)        MEDICATIONS ON DISCHARGE     Medication List        CHANGE how you take these medications        Instructions   Tresiba FlexTouch 100 UNIT/ML Sopn  What changed: how much to take  Generic drug: Insulin Degludec   Inject 15 Units under the skin every evening for  30 days. 36 units at night  Dose: 15 Units            CONTINUE taking these medications        Instructions   aspirin EC 81 MG Tbec  Commonly known as: Ecotrin   Take 1 Tablet by mouth every day.  Dose: 81 mg     atorvastatin 80 MG tablet  Commonly known as: Lipitor   Take 1 Tablet by mouth every evening.  Dose: 80 mg     carvedilol 3.125 MG Tabs  Commonly known as: Coreg   Take 1 Tablet by mouth 2 times a day with meals.  Dose: 3.125 mg     cholestyramine 4 g packet  Commonly known as: Questran   Take 1 Packet by mouth 2 times a day. 1000 + 1700  Dose: 1 Packet     enalapril 20 MG tablet  Commonly known as: Vasotec   TAKE ONE-HALF (1/2) TABLET TWICE A DAY  Dose: 10 mg     HumuLIN R 100 Unit/mL Soln  Generic drug: insulin regular   Inject 4-8 Units under the skin 3 times a day before meals. Sliding Scale   150 - 200 = 3 units  201 - 250 = 4 units  251 - 300 = 5 units  301 - 350 = 6 units  351 - 400 = 7 units   PLUS CARB CORRECTION  of 1 unit for every 15 g carb  Dose: 4-8 Units     Lucentis 0.3 MG/0.05ML Soln  Generic drug: Ranibizumab   1 Dose by Intravitreal route see administration instructions. Every 6-8 weeks  Dose: 1 Dose     omeprazole 20 MG delayed-release capsule  Commonly known as: PriLOSEC   Take 1 Capsule by mouth every morning.  Dose: 20 mg     sodium bicarbonate 650 MG Tabs  Commonly known as: Sodium Bicarbonate   Take 1 Tablet by mouth in the morning, at noon, and at bedtime.  Dose: 650 mg            STOP taking these medications      furosemide 40 MG Tabs  Commonly known as: Lasix     oseltamivir 30 MG Caps  Commonly known as: Tamiflu              Allergies  No Known Allergies    DIET  Orders Placed This Encounter   Procedures    Diet Order Diet: Consistent CHO (Diabetic); Second Modifier: (optional): Renal     Standing Status:   Standing     Number of Occurrences:   1     Order Specific Question:   Diet:     Answer:   Consistent CHO (Diabetic) [4]     Order Specific Question:   Second Modifier:  (optional)     Answer:   Renal [8]       ACTIVITY  As tolerated.  Weight bearing as tolerated    CONSULTATIONS  Ortho     PROCEDURES  MR-FOOT-W/O LEFT   Final Result      1.  Marrow edema involving the fifth metatarsal head with also seen marrow edema and fragmentation of the fifth proximal phalanx and marrow edema involving the middle and distal phalanges. This most likely represents osteomyelitis with pathologic    fracture. There is surrounding cellulitis.      2.  Mild marrow edema involving the fourth metatarsal head and the fourth proximal phalanx possibly reactive in nature versus osteomyelitis.      3.  Edema/induration of the soft tissues of the forefoot, midfoot and hindfoot with also seen involvement of the intrinsic muscles of the foot likely representing cellulitis and myositis. There are punctate areas of gas within the soft tissues of the    forefoot and midfoot which may indicate areas of tissue necrosis.      4.  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      IR-BRADLEY SABA PLACEMENT >5   Final Result      1. ULTRASOUND AND FLUOROSCOPIC GUIDED PLACEMENT OF A Right INTERNAL JUGULAR 14.5 [place] GLIDE PATH TUNNELED DIALYSIS CATHETER.      2. THE HEMODIALYSIS CATHETER MAY BE USED IMMEDIATELY AS CLINICALLY INDICATED. FLUSHES PER PROTOCOL.         US-FLOWER SINGLE LEVEL BILAT   Final Result      DX-CHEST-PORTABLE (1 VIEW)   Final Result      CT-FOOT W/O PLUS RECONS LEFT   Final Result      1.  Findings are consistent with osteomyelitis and pathologic fracture involving the proximal phalanx of the fifth digit.      2.  Soft tissue swelling and emphysema is present around the fifth digit and in the plantar soft tissues of the foot. These findings are consistent with cellulitis.      DX-FOOT-COMPLETE 3+ LEFT   Final Result      1.  Comminuted fracture proximal phalanx fifth digit is identified which could be pathologic and related to osteomyelitis.      2.  Soft tissue swelling and emphysema  in the fifth digit which could be due to cellulitis.      DX-CHEST-PORTABLE (1 VIEW)   Final Result         No acute cardiac or pulmonary abnormality is identified.             LABORATORY  Lab Results   Component Value Date    SODIUM 128 (L) 02/05/2024    POTASSIUM 4.2 02/05/2024    CHLORIDE 98 02/05/2024    CO2 23 02/05/2024    GLUCOSE 191 (H) 02/05/2024    BUN 32 (H) 02/05/2024    CREATININE 3.32 (H) 02/05/2024        Lab Results   Component Value Date    WBC 8.0 02/05/2024    HEMOGLOBIN 7.9 (L) 02/05/2024    HEMATOCRIT 24.3 (L) 02/05/2024    PLATELETCT 128 (L) 02/05/2024        Total time of the discharge process exceeds 36  minutes.

## 2024-02-05 NOTE — CONSULTS
Physical Medicine and Rehabilitation Consultation              Date of initial consultation: 2/5/2024  Requesting provider: Spence MD   Consulting provider: Shanna Quintero D.O.  Reason for consultation: assess for acute inpatient rehab appropriateness  LOS: 11 Day(s)    Chief complaint: LLE wound     HPI: The patient is a 53 y.o.  male with a past medical history of DM, CAD, HTN, HLD, CKD (followed by [facility]);  who presented on 1/25/2024  3:02 PM with LLE wound.  Per documentation, patient had purulent discharge from his left fifth toe.  Upon evaluation in the emergency department patient was noted to be hypotensive with concern for sepsis.  WBC 24.7 and images of the left lower extremity were concerning for gas in the soft tissues.  Orthopedic surgery was consulted, CT of the left lower extremity was obtained which showed osteomyelitis and pathologic fracture involving the proximal phalanx of the fifth digit.  There was also soft tissue swelling and emphysema present in the tissues around the fifth digit.  Patient was taken to the OR on 1/30 for left fifth ray amputation and I&D of the left plantar foot abscess performed by Dr. Segura.  Unfortunately patient continued to have poor wound healing and had an unsalvageable foot postop and ultimately required left BKA on 2/1/2024.  Postop patient had acute blood loss anemia with hemoglobin down to 6.9, patient required 1 unit packed red blood cells.  During patient's hospitalization, patient had worsening of his CKD, patient is now requiring hemodialysis.  He is on a hemodialysis schedule MWF patient has been set up for outpatient HD.    Patient seen and examined at bedside.  Patient reports he feels very happy because he has so much edema in his lower extremity.  Feels like he has 50 pounds of fluid on him.  Otherwise patient denies stump pain, denies phantom pain.  Patient reports he feels okay but is worried about how he will transfer.   Reviewed with patient that he was previously only willing to consider going home.  Patient reports he is now willing to consider rehab but only if the fluid will come off of him.  Patient attributes his inability to transfer to his right lower extremity edema.  Reviewed options for rehab, patient is agreeable to IRF level therapy however he has particular questions about rehab including whether he will have a private room and if he can bring outside food.  Patient reports he is particular about his food and hospital food will cause him to have diarrhea.  Otherwise patient is agreeable to rehab but has many concerns about what I will be like there, and answered all questions relayed message to TCC's that patient would like to know more information about visiting hours in room set up.    Social Hx:  Patient lives with parents in a one-story house  [address]  At prior level of function patient was Independent with mobility and ADLs.     Tobacco: Former smoker, quit 22 years ago  Alcohol: Denies  Drugs: Marijuana use    THERAPY:  Restrictions: Fall Risk, NWB LLE   PT: Functional mobility   2/5 Mod A transfers, unable to tolerate sit to stand, unable to tolerate gait   2/2 stand by assist bed mobility     OT: ADLs  2/2 Mod A lower body dressing     SLP:   None     IMAGING:  MR-FOOT-W/O LEFT  Narrative: 1/29/2024 8:17 PM    HISTORY/REASON FOR EXAM: Left foot infection.    TECHNIQUE/EXAM DESCRIPTION:  MRI of the LEFT foot without contrast.    Using a [employer] Signa 1.5 Lashae MRI scanner, T1 axial, sagittal, and coronal, fast spin-echo T2 fat-suppressed axial and coronal, and fast inversion recovery sagittal images were obtained.    COMPARISON: CT scan 1/25/2024    FINDINGS:  Osseous structures/Cartilaginous surfaces: There is marrow edema involving the fifth metatarsal head. There is marrow edema with fragmentation of the fifth proximal phalanx. There is also marrow edema involving the fifth middle and distal phalanges.   There is  mild marrow edema involving the fourth metatarsal head and the fourth proximal phalanx. There is osteoarthritis of the first MTP joint as well as the tibiotalar joint. There is a small tibiotalar joint effusion.    Miscellaneous: There is edema/induration involving the fifth toe extending into the forefoot and midfoot most prominently dorsally and laterally. There is also edema involving the intrinsic muscles of the foot. There are punctate areas of low signal seen   scattered throughout the deep soft tissues of the midfoot as well as the forefoot likely representing areas of punctate gas.  Impression: 1.  Marrow edema involving the fifth metatarsal head with also seen marrow edema and fragmentation of the fifth proximal phalanx and marrow edema involving the middle and distal phalanges. This most likely represents osteomyelitis with pathologic   fracture. There is surrounding cellulitis.    2.  Mild marrow edema involving the fourth metatarsal head and the fourth proximal phalanx possibly reactive in nature versus osteomyelitis.    3.  Edema/induration of the soft tissues of the forefoot, midfoot and hindfoot with also seen involvement of the intrinsic muscles of the foot likely representing cellulitis and myositis. There are punctate areas of gas within the soft tissues of the   forefoot and midfoot which may indicate areas of tissue necrosis.    4.  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        PROCEDURES:  1/30 left fifth ray amputation with I&D of left plantar foot abscess  2/1 left BKA performed by Dr. Segura     [facility]:  Past Medical History:   Diagnosis Date    Anesthesia     Hypotension post-op, persisted for a few months    Anesthesia 12/06/2023    Patient stated after 2016 colonoscopy he became hypotensive.    Bowel habit changes     History GI problems    Cataract     IOL - Left eye    Cataract     IOL OD    Deaf, right     Diabetes (HCC)     Oral medications & insulin    Heart burn      GERD; takes Prilosec    Hemorrhagic disorder (HCC)     ASA protection for stent and cardiac history    History of non-ST elevation myocardial infarction (NSTEMI)     Per Problem List    Hx of heart artery stent 2019    Hyperlipidemia     Hyperparathyroidism due to renal insufficiency (HCC)     Per Problem List    Hypertension     Myocardial infarct (HCC)     Pneumonia     H/O    Renal disorder 12/06/2023    Stage 3 CKD       PSH:  Past Surgical History:   Procedure Laterality Date    KNEE AMPUTATION BELOW Left 2/1/2024    Procedure: AMPUTATION, BELOW KNEE;  Surgeon: Celestino Segura M.D.;  Location: [facility];  Service: Orthopedics    TOE AMPUTATION Left 1/30/2024    Procedure: AMPUTATION, TOE- 5TH;  Surgeon: Celestino Segura M.D.;  Location: [facility];  Service: Orthopedics    PB OPEN TREATMENT PBOX HUMERAL FRACTURE Left 12/11/2023    Procedure: LEFT OPEN REDUCTION INTERNAL FIXATION  HUMERUS, PROXIMAL AND LEFT BICEPS TENODESIS;  Surgeon: Aris Pierre M.D.;  Location: [facility];  Service: Orthopedics    PB REPAIR BICEPS LONG TENDON Left 12/11/2023    Procedure: TENODESIS, BICEPS, OPEN;  Surgeon: Aris Pierre M.D.;  Location: [facility];  Service: Orthopedics    CATARACT EXTRACTION WITH IOL Right 2023    TOE AMPUTATION Left 09/30/2021    Procedure: AMPUTATION, TOE;  Surgeon: Tomer Hart M.D.;  Location: [facility];  Service: Orthopedics    STENT PLACEMENT  2019    STEMI with nonobstructive LAD    TOE AMPUTATION Right 07/02/2018    Procedure: Transmetatarsal amputation;  Surgeon: Ravi Bernardo M.D.;  Location: [facility];  Service: Orthopedics    ACHILLES TENDON REPAIR Right 07/02/2018    Procedure: ACHILLES LENGTHENING;  Surgeon: Ravi Bernardo M.D.;  Location: [facility];  Service: Orthopedics    IRRIGATION & DEBRIDEMENT ORTHO Right 07/02/2018    Procedure: IRRIGATION & DEBRIDEMENT ORTHO;  Surgeon: Ravi Bernardo  M.D.;  Location: [facility];  Service: Orthopedics    OTHER Left     IOL    OTHER ABDOMINAL SURGERY      OTHER ORTHOPEDIC SURGERY      TONSILLECTOMY      VASECTOMY         FHX:  Family History   Problem Relation Age of Onset    No Known Problems Mother     Diabetes Father     Heart Disease Father     Diabetes Maternal Grandmother     Heart Disease Maternal Grandfather     Lung Disease Paternal Grandmother     Cancer Paternal Grandmother     Cancer Paternal Grandfather     Lung Cancer Paternal Grandfather     Kidney Disease Neg Hx        Medications:  Current Facility-Administered Medications   Medication Dose    insulin GLARGINE (Lantus,Semglee) injection  15 Units    calcium carbonate (Os-Quinton 500) tablet 500 mg  500 mg    lidocaine (Xylocaine) 1 % injection 1 mL  1 mL    Pharmacy Consult Request ...Pain Management Review 1 Each  1 Each    acetaminophen (Tylenol) tablet 1,000 mg  1,000 mg    Followed by    [START ON 2/6/2024] acetaminophen (Tylenol) tablet 1,000 mg  1,000 mg    oxyCODONE immediate-release (Roxicodone) tablet 5 mg  5 mg    Or    oxyCODONE immediate release (Roxicodone) tablet 10 mg  10 mg    Or    HYDROmorphone (Dilaudid) injection 0.5 mg  0.5 mg    heparin injection 5,000 Units  5,000 Units    heparin injection 3,600 Units  3,600 Units    insulin regular (HumuLIN R,NovoLIN R) injection  3-14 Units    And    dextrose 50% (D50W) injection 25 g  25 g    NS (Bolus) 0.9 % infusion 250 mL  250 mL    epoetin (Retacrit) 5,000 Units injection (Dialysis Use Only)  5,000 Units    LORazepam (Ativan) injection 1 mg  1 mg    atorvastatin (Lipitor) tablet 80 mg  80 mg    omeprazole (PriLOSEC) capsule 20 mg  20 mg    senna-docusate (Pericolace Or Senokot S) 8.6-50 MG per tablet 2 Tablet  2 Tablet    And    polyethylene glycol/lytes (Miralax) Packet 1 Packet  1 Packet    And    bisacodyl (Dulcolax) suppository 10 mg  10 mg    ondansetron (Zofran) syringe/vial injection 4 mg  4 mg    ondansetron (Zofran  ODT) dispertab 4 mg  4 mg    promethazine (Phenergan) tablet 12.5-25 mg  12.5-25 mg    promethazine (Phenergan) suppository 12.5-25 mg  12.5-25 mg    prochlorperazine (Compazine) injection 5-10 mg  5-10 mg    LR (Bolus) infusion 500 mL  500 mL    aspirin EC tablet 81 mg  81 mg    cholestyramine (Questran) 4 GM powder 4 g  1 Packet       Allergies:  No Known Allergies    Physical Exam:  Vitals: BP (!) 153/78   Pulse 76   Temp 35.9 °C (96.7 °F) (Temporal)   Resp 18   Ht 1.829 m (6')   Wt 115 kg (253 lb 4.9 oz)   SpO2 96%   Gen: NAD, laying comfortably in bed  Head:  NC/AT  Eyes/ Nose/ Mouth: PERRLA, moist mucous membranes  Cardio: RRR, good distal perfusion, warm extremities  Pulm: normal respiratory effort, no cyanosis, on room air  Abd: Soft NTND,  Ext: 2+ pitting edema right lower extremity no calf tenderness. No clubbing.  Left BKA immobilizer in place    Mental status:  A&Ox4 (person, place, date, situation) answers questions appropriately follows commands  Speech: fluent, no aphasia or dysarthria    CRANIAL NERVES:  2,3: visual acuity grossly intact, PERRL  3,4,6: EOMI bilaterally, no nystagmus or diplopia  5: sensation intact to light touch bilaterally and symmetric  7: no facial asymmetry  8: hearing grossly intact      Motor:      Upper Extremity  Myotome R L   Shoulder flexion C5 5/5 5/5   Elbow flexion C5 5/5 5/5   Wrist extension C6 5/5 5/5   Elbow extension C7 5/5 5/5   Finger flexion C8 5/5 5/5   Finger abduction T1 5/5 5/5     Lower Extremity Myotome R L   Hip flexion L2 3/5 5/5   Knee extension L3 Not tested/5 5/5   Ankle dorsiflexion L4 NA 5/5   Toe extension L5 N/A N/A   Ankle plantarflexion S1 N/A 5/5       Negative Pronator drift bilaterally    Sensory:   intact to light touch through out    DTRs: 2+ in bilateral  biceps  No clonus at bilateral ankles  Negative Wolfe b/l     Tone: no spasticity noted    Coordination:   intact finger to nose bilaterally  intact fine motor with fingers  bilaterally      Labs: Reviewed and significant for   Recent Labs     02/04/24  0309 02/04/24  1510 02/05/24  0330   RBC 2.42* 2.66* 2.78*   HEMOGLOBIN 6.9* 7.4* 7.9*   HEMATOCRIT 21.9* 23.5* 24.3*   PLATELETCT 112* 119* 128*   IRON 17*  --   --    FERRITIN 622.0*  --   --    TOTIRONBC 113*  --   --      Recent Labs     02/03/24  0240 02/03/24  1300 02/04/24  0309 02/05/24  0330   SODIUM 132*  --  130* 128*   POTASSIUM 4.1 4.1 4.1 4.2   CHLORIDE 99  --  99 98   CO2 24  --  23 23   GLUCOSE 197*  --  246* 191*   BUN 21  --  26* 32*   CREATININE 2.60*  --  3.05* 3.32*   CALCIUM 6.6*  --  6.5* 6.6*     Recent Results (from the past 24 hour(s))   POCT glucose device results    Collection Time: 02/04/24  5:56 PM   Result Value Ref Range    POC Glucose, Blood 233 (H) 65 - 99 mg/dL   POCT glucose device results    Collection Time: 02/04/24  9:25 PM   Result Value Ref Range    POC Glucose, Blood 225 (H) 65 - 99 mg/dL   Basic Metabolic Panel    Collection Time: 02/05/24  3:30 AM   Result Value Ref Range    Sodium 128 (L) 135 - 145 mmol/L    Potassium 4.2 3.6 - 5.5 mmol/L    Chloride 98 96 - 112 mmol/L    Co2 23 20 - 33 mmol/L    Glucose 191 (H) 65 - 99 mg/dL    Bun 32 (H) 8 - 22 mg/dL    Creatinine 3.32 (H) 0.50 - 1.40 mg/dL    Calcium 6.6 (LL) 8.5 - 10.5 mg/dL    Anion Gap 7.0 7.0 - 16.0   CBC WITH DIFFERENTIAL    Collection Time: 02/05/24  3:30 AM   Result Value Ref Range    WBC 8.0 4.8 - 10.8 K/uL    RBC 2.78 (L) 4.70 - 6.10 M/uL    Hemoglobin 7.9 (L) 14.0 - 18.0 g/dL    Hematocrit 24.3 (L) 42.0 - 52.0 %    MCV 87.4 81.4 - 97.8 fL    MCH 28.4 27.0 - 33.0 pg    MCHC 32.5 32.3 - 36.5 g/dL    RDW 43.5 35.9 - 50.0 fL    Platelet Count 128 (L) 164 - 446 K/uL    MPV 9.9 9.0 - 12.9 fL    Neutrophils-Polys 61.70 44.00 - 72.00 %    Lymphocytes 27.70 22.00 - 41.00 %    Monocytes 6.90 0.00 - 13.40 %    Eosinophils 2.30 0.00 - 6.90 %    Basophils 0.60 0.00 - 1.80 %    Immature Granulocytes 0.80 0.00 - 0.90 %    Nucleated RBC 0.00 0.00  - 0.20 /100 WBC    Neutrophils (Absolute) 4.91 1.82 - 7.42 K/uL    Lymphs (Absolute) 2.20 1.00 - 4.80 K/uL    Monos (Absolute) 0.55 0.00 - 0.85 K/uL    Eos (Absolute) 0.18 0.00 - 0.51 K/uL    Baso (Absolute) 0.05 0.00 - 0.12 K/uL    Immature Granulocytes (abs) 0.06 0.00 - 0.11 K/uL    NRBC (Absolute) 0.00 K/uL   ESTIMATED GFR    Collection Time: 02/05/24  3:30 AM   Result Value Ref Range    GFR (CKD-EPI) 21 (A) >60 mL/min/1.73 m 2   POCT glucose device results    Collection Time: 02/05/24  8:09 AM   Result Value Ref Range    POC Glucose, Blood 212 (H) 65 - 99 mg/dL   POCT glucose device results    Collection Time: 02/05/24 11:43 AM   Result Value Ref Range    POC Glucose, Blood 162 (H) 65 - 99 mg/dL         ASSESSMENT:  Patient is a 53 y.o. male admitted with left lower extremity acute osteomyelitis    [facility] Code / Diagnosis to Support: 0005.4 - Amputation: Unilateral Lower Limb Below the Knee (BK)    Rehabilitation: Impaired ADLs and mobility  Patient is anticipated to be a good candidate for inpatient rehab based on needs for PT, OT; pending confirmation of discharge support from parents.     Barriers to transfer include: Insurance authorization, TCCs to verify disposition, medical clearance and bed availability     Additional Recommendations:  Left BKA  -Originally admitted with left lower extremity wound initiated at the left fifth digit  - Orthopedic surgery consulted  - 1/30 left fifth digit amputation by Dr. Segura  - Postop unsalvageable limb and poor wound healing  - Patient ultimately required 2/1 left BKA performed by Dr. Segura  - Continue with PT/OT, previously patient was adamant about discharging home, patient is now having difficulty with transfers, is mod assist for transfers and is now agreeable to postacute rehab    End-stage renal disease  - Patient CKD stage III progressed to ESRD during hospitalization  - Patient is newly on HD MWF, has been set up for outpatient chair for HD  - Nephrology  has been following,  -Patient underwent 1/29 tunneled RIJ HD catheter placement  - Creatinine 3.32    Acute blood loss anemia  - Secondary to postop anemia as well as impaired kidney function  - 2/5 hemoglobin 7.9  - Primary team monitoring CBC    Hyponatremia  - Sodium 128  - Sodium downtrending  - Primary team monitoring electrolytes    Hypocalcemia  - Calcium 6.6  - Primary team monitoring electrolytes, with assistance of from nephrology    Dispo:  - patient is currently functioning below their level of baseline, recommend post acute rehab  - recommend IRF level therapy with 3hr of therapy 5 days per week  - piror to acceptance to IRF, will need confirmation of DC support and confirmation that patient still has his outpatient HD seat for after discharge from rehab  - TCC to assist with insurance auth and DC support         Medical Complexity:  Left lower extremity wound  Left BKA  Diabetes  End-stage renal disease  Acute blood loss anemia  Hyponatremia  Hypocalcemia  Impaired mobility and ADLs      DVT PPX: Heparin      Thank you for allowing us to participate in the care of this patient.     Patient was seen for 81 minutes on unit/floor of which > 50% of time was spent on counseling and coordination of care regarding the above, including prognosis, risk reduction, benefits of treatment, and options for next stage of care.    Shanna Quintero D.O.   Physical Medicine and Rehabilitation     Please note that this dictation was created using voice recognition software. I have made every reasonable attempt to correct obvious errors, but there may be errors of grammar and possibly content that I did not discover before finalizing the note.

## 2024-02-05 NOTE — CARE PLAN
The patient is Stable - Low risk of patient condition declining or worsening    Shift Goals  Clinical Goals: pain management to comfort level  Patient Goals: sleep, discharge in AM  Family Goals: JENNIFER    Progress made toward(s) clinical / shift goals:        Problem: Knowledge Deficit - Standard  Goal: Patient and family/care givers will demonstrate understanding of plan of care, disease process/condition, diagnostic tests and medications  Outcome: Progressing     Problem: Pain - Standard  Goal: Alleviation of pain or a reduction in pain to the patient’s comfort goal  Outcome: Progressing       Patient is not progressing towards the following goals:

## 2024-02-05 NOTE — DISCHARGE INSTRUCTIONS
Discharge Instructions per Marcelo Sanders M.D.    Repeat CBC, BMP with hemodialysis on Thursday  Insulin decreased to 20 units Lantus daily.  Monitor fingerstick closely, follow-up with PCP for insulin adjustment  Follow-up with orthopedics, nephrology    DIAGNOSIS:  Principal Problem:    Septic shock (HCC) (POA: Yes)  Active Problems:    Acute renal failure superimposed on stage 3b chronic kidney disease (HCC) (POA: Yes)    Type 2 diabetes mellitus with kidney complication, with long-term current use of insulin (HCC) (POA: Yes)    Osteomyelitis and soft tissue infection of left foot (HCC) (POA: Yes)    Soft tissue infection of foot (POA: Yes)    Unspecified fracture of left toe(s), initial encounter for open fracture (POA: Yes)    Hypocalcemia (POA: Yes)    Anemia (POA: Unknown)  Resolved Problems:    Normocytic anemia (POA: Yes)      Return to ER if you develop any of the following symptoms fever, chills, weakness, not feeling well, rash, bleeding, blurred vision, palpitations, cough, pain in any part of your body, shortness of breath, nausea, vomiting, diarrhea, difficulty with urination, dizziness, headache, seizures, loss of consciousness or if you develop any new symptoms.

## 2024-02-05 NOTE — THERAPY
Physical Therapy   Daily Treatment     Patient Name: Beni Moore  Age:  53 y.o., Sex:  male  Medical Record #: [id number]  Today's Date: 2/5/2024     Precautions  Precautions: Fall Risk;Non Weight Bearing Left Lower Extremity  Comments: s/p L BKA 2/1; HD MWF    Assessment    Pt seen for follow up PT tx. Pts personal WC has yet to be delivered for dc. Pt agreeable to work on transfers needed for dc despite having just returned from HD. He was able to get to EOB without assistance. R shoe donned with prosthetic for TMA. Pt unable to perform STS today due to generalized weakness which pt was attributing to fluid overload. Pt then performed a sit pivot with mod assist to and from bedside chair without armrest. Discussed with pt that at this point, I do not believe he has adequate strength needed to perform more advanced transfers needed to dc home (ie car transfer, toilet transfers, shower transfers...). Pt has to be able to perform car transfers 3x/wk to and from HD in order to decrease risk of readmission. Introduced idea of slide board for transfers but pt requesting to speak with a MD about fluid overload concerns and how it is impacting his mobility (pt had just returned from HD where per RN, 3L were taken off). PT will cont while pt is in acute care setting.     Optimally, if pt is willing he would benefit from acute rehab stay to focus on generalized strengthening and safety with transfers including car transfers.     Plan    Treatment Plan Status: Modify Current Treatment Plan  Type of Treatment: Bed Mobility, Gait Training, Neuro Re-Education / Balance, Self Care / Home Evaluation, Therapeutic Activities, Therapeutic Exercise  Treatment Frequency: 5 Times per Week  Treatment Duration: Until Therapy Goals Met    DC Equipment Recommendations: Wheelchair  Discharge Recommendations: Recommend post-acute placement for additional physical therapy services prior to discharge home (until pt is able to demonstrate  "transfers with SPV)      Subjective    Pt expressing his frustration with absence of WC which was supposed to be delivered yesterday as well as his \"fluid overload.\"       02/05/24 1213   Vitals   O2 (LPM) 0   O2 Delivery Device None - Room Air   Cognition    Cognition / Consciousness X   Level of Consciousness Alert   Comments pt very frustrated and requesting to speak with a dr. he is perseverating on fluid overload being the cause of his mobility deficits rather than generalized weakness post op and new restrictions with BKA   Other Treatments   Other Treatments Provided Spent significant time discussing dc concerns with pt. Pt is currently not able to demonstrate strength to perform transfers needed to dc home including car transfers needed to get to and from HD   Balance   Sitting Balance (Static) Fair   Sitting Balance (Dynamic) Fair   Standing Balance (Static) Dependent   Weight Shift Sitting Poor   Skilled Intervention Verbal Cuing;Tactile Cuing;Sequencing;Postural Facilitation;Compensatory Strategies   Comments attempted sit pivot transfers   Bed Mobility    Supine to Sit Standby Assist   Sit to Supine Standby Assist   Scooting Minimal Assist   Skilled Intervention Verbal Cuing;Compensatory Strategies   Comments use of bed rails   Gait Analysis   Gait Level Of Assist Unable to Participate   Functional Mobility   Sit to Stand Unable to Participate   Bed, Chair, Wheelchair Transfer Moderate Assist   Transfer Method Sit Pivot   Mobility EOB, sit pivot to chair, sit pivot back to bed   Skilled Intervention Verbal Cuing;Compensatory Strategies   Short Term Goals    Short Term Goal # 1 Pt will perform bed mobility with flat bed, no rail with supervision in 6 visits.   Goal Outcome # 1 goal not met   Short Term Goal # 2 Pt will transfer bed to  via stand-pivot with supervision in 6 visits to improve functional indep.   Goal Outcome # 2 Goal not met   Short Term Goal # 3 Pt will ambulate x 15 feet using FWW with " supervision in 6 visits to improve functional indep.   Goal Outcome # 3 Goal not met   Physical Therapy Treatment Plan   Physical Therapy Treatment Plan Modify Current Treatment Plan   Treatment Plan  Bed Mobility;Gait Training;Neuro Re-Education / Balance;Self Care / Home Evaluation;Therapeutic Activities;Therapeutic Exercise   Treatment Frequency 5 Times per Week   Duration Until Therapy Goals Met   Anticipated Discharge Equipment and Recommendations   DC Equipment Recommendations Wheelchair   Discharge Recommendations Recommend post-acute placement for additional physical therapy services prior to discharge home  (until pt is able to demonstrate transfers with SPV)

## 2024-02-05 NOTE — PROGRESS NOTES
Orthopaedic Progress Note    Interval changes:  Patient doing well    LLE dressings CDI  Cleared for DC home by ortho pending medicine clearance    ROS - Patient denies any new issues.  Pain well controlled.    BP (!) 153/78   Pulse 76   Temp 35.9 °C (96.7 °F) (Temporal)   Resp 18   Ht 1.829 m (6')   Wt 115 kg (253 lb 4.9 oz)   SpO2 96%     Patient seen and examined  No acute distress  Breathing non labored  RRR  LLE stump protector in place, dressings CDI, no contracture noted.    Recent Labs     02/04/24  0309 02/04/24  1510 02/05/24  0330   WBC 7.8 8.3 8.0   RBC 2.42* 2.66* 2.78*   HEMOGLOBIN 6.9* 7.4* 7.9*   HEMATOCRIT 21.9* 23.5* 24.3*   MCV 90.5 88.3 87.4   MCH 28.5 27.8 28.4   MCHC 31.5* 31.5* 32.5   RDW 45.2 44.2 43.5   PLATELETCT 112* 119* 128*   MPV 9.9 9.6 9.9       Active Hospital Problems    Diagnosis     Anemia [D64.9]     Hypocalcemia [E83.51]     Septic shock ([facility]) [A41.9, R65.21]     Soft tissue infection of foot [L08.9]     Unspecified fracture of left toe(s), initial encounter for open fracture [S92.912B]     Osteomyelitis and soft tissue infection of left foot ([facility]) [M86.9]     Type 2 diabetes mellitus with kidney complication, with long-term current use of insulin ([facility]) [E11.29, Z79.4]     Acute renal failure superimposed on stage 3b chronic kidney disease ([facility]) [N17.9, N18.32]        Assessment/Plan:  Patient doing well    LLE dressings CDI  Cleared for DC home by ortho pending medicine clearance  POD#4 S/P  Left below knee amputation  POD#6 S/P   1.  Left fifth ray amputation through metatarsal shaft    2. irrigation and debridement of left plantar foot abscess   Wt bearing status - WBAT  Wound care/Drains - Dressings to be changed every other day by nursing. Or PRN for saturation starting POD#2  Future Procedures - none planned   Sutures/Staples out- 14-21 days post operatively. Removal will completed by ortho mid levels only.  PT/OT-initiated  Antibiotics: copmleted  DVT  Prophylaxis- TEDS/SCDs/Foot pumps/heparin  Farrell-not needed per ortho  Case Coordination for Discharge Planning - Disposition per therapy recs.

## 2024-02-05 NOTE — PROGRESS NOTES
Assumed pt care with RN. Pt is A&OX4 and states he is in 4/10 pain but declines interventions at this time. Plan of care discussed, no further questions. HD catheter and CVC in place. Personal belongings and call light within reach, bed alarm on.

## 2024-02-05 NOTE — PROGRESS NOTES
Lab called with critical result of Calcium at 6.6. Critical lab result read back to Lab.   Dr. Martinez notified of critical lab result at 9891.  Critical lab result read back by Dr. Martinez. No changes.

## 2024-02-05 NOTE — DISCHARGE PLANNING
Case Management Discharge Planning    Admission Date: 1/25/2024  GMLOS: 9.9  ALOS: 11    6-Clicks ADL Score: 15  6-Clicks Mobility Score: 11  PT and/or OT Eval ordered: Yes  Post-acute Referrals Ordered: Yes  Post-acute Choice Obtained: NA  Has referral(s) been sent to post-acute provider:  TJ    Anticipated Discharge Dispo: Discharge Disposition: Discharged to home/self care (01) (Home once wheelchair arrives.)  Discharge Address: [address] [place] [id number]  Discharge Contact Phone Number: [phone]    DME Needed: Yes    DME Ordered: Yes  Wheelchair to be delivered today by [employer] to [facility].     Action(s) Taken:   Pt discussed during IDT rounds. Pt is not yet medically clear, pending Nephrology clearance.     Plan is for pt to d/c home today or tomorrow with his parents at [facility] post dialysis.     Escalations Completed: None    Medically Clear: No    Next Steps:  JUAN RN to follow up with medical team to discuss discharge barriers or needs.     Barriers to Discharge: Medical clearance    Is the patient up for discharge tomorrow: No

## 2024-02-05 NOTE — PROCEDURES
Pt presented with AXEL on CKD, required dialysis.  Pt is doing better.  Seen and examined while getting HD.  Plan to discharge patient for outpatient dialysis, follow-up urine output and kidney function to evaluate for renal recovery.

## 2024-02-06 LAB
1,25(OH)2D3 SERPL-MCNC: <5 PG/ML (ref 19.9–79.3)
ANION GAP SERPL CALC-SCNC: 7 MMOL/L (ref 7–16)
BARCODED ABORH UBTYP: 6200
BARCODED ABORH UBTYP: 6200
BARCODED PRD CODE UBPRD: NORMAL
BARCODED PRD CODE UBPRD: NORMAL
BARCODED UNIT NUM UBUNT: NORMAL
BARCODED UNIT NUM UBUNT: NORMAL
BASOPHILS # BLD AUTO: 0.4 % (ref 0–1.8)
BASOPHILS # BLD: 0.04 K/UL (ref 0–0.12)
BUN SERPL-MCNC: 24 MG/DL (ref 8–22)
CALCIUM SERPL-MCNC: 6.5 MG/DL (ref 8.5–10.5)
CHLORIDE SERPL-SCNC: 100 MMOL/L (ref 96–112)
CO2 SERPL-SCNC: 25 MMOL/L (ref 20–33)
COMPONENT R 8504R: NORMAL
COMPONENT R 8504R: NORMAL
CREAT SERPL-MCNC: 2.87 MG/DL (ref 0.5–1.4)
EOSINOPHIL # BLD AUTO: 0.19 K/UL (ref 0–0.51)
EOSINOPHIL NFR BLD: 2.1 % (ref 0–6.9)
ERYTHROCYTE [DISTWIDTH] IN BLOOD BY AUTOMATED COUNT: 42.5 FL (ref 35.9–50)
ERYTHROCYTE [DISTWIDTH] IN BLOOD BY AUTOMATED COUNT: 43.8 FL (ref 35.9–50)
GFR SERPLBLD CREATININE-BSD FMLA CKD-EPI: 25 ML/MIN/1.73 M 2
GLUCOSE BLD STRIP.AUTO-MCNC: 201 MG/DL (ref 65–99)
GLUCOSE BLD STRIP.AUTO-MCNC: 233 MG/DL (ref 65–99)
GLUCOSE BLD STRIP.AUTO-MCNC: 236 MG/DL (ref 65–99)
GLUCOSE BLD STRIP.AUTO-MCNC: 247 MG/DL (ref 65–99)
GLUCOSE SERPL-MCNC: 222 MG/DL (ref 65–99)
HCT VFR BLD AUTO: 15.6 % (ref 42–52)
HCT VFR BLD AUTO: 22 % (ref 42–52)
HGB BLD-MCNC: 5 G/DL (ref 14–18)
HGB BLD-MCNC: 7.3 G/DL (ref 14–18)
IMM GRANULOCYTES # BLD AUTO: 0.05 K/UL (ref 0–0.11)
IMM GRANULOCYTES NFR BLD AUTO: 0.5 % (ref 0–0.9)
LYMPHOCYTES # BLD AUTO: 2.95 K/UL (ref 1–4.8)
LYMPHOCYTES NFR BLD: 32.2 % (ref 22–41)
MCH RBC QN AUTO: 28.9 PG (ref 27–33)
MCH RBC QN AUTO: 28.9 PG (ref 27–33)
MCHC RBC AUTO-ENTMCNC: 32.1 G/DL (ref 32.3–36.5)
MCHC RBC AUTO-ENTMCNC: 33.2 G/DL (ref 32.3–36.5)
MCV RBC AUTO: 87 FL (ref 81.4–97.8)
MCV RBC AUTO: 90.2 FL (ref 81.4–97.8)
MONOCYTES # BLD AUTO: 0.75 K/UL (ref 0–0.85)
MONOCYTES NFR BLD AUTO: 8.2 % (ref 0–13.4)
NEUTROPHILS # BLD AUTO: 5.19 K/UL (ref 1.82–7.42)
NEUTROPHILS NFR BLD: 56.6 % (ref 44–72)
NRBC # BLD AUTO: 0 K/UL
NRBC BLD-RTO: 0 /100 WBC (ref 0–0.2)
PLATELET # BLD AUTO: 132 K/UL (ref 164–446)
PLATELET # BLD AUTO: 156 K/UL (ref 164–446)
PMV BLD AUTO: 9.7 FL (ref 9–12.9)
PMV BLD AUTO: 9.8 FL (ref 9–12.9)
POTASSIUM SERPL-SCNC: 4.2 MMOL/L (ref 3.6–5.5)
PRODUCT TYPE UPROD: NORMAL
PRODUCT TYPE UPROD: NORMAL
RBC # BLD AUTO: 1.73 M/UL (ref 4.7–6.1)
RBC # BLD AUTO: 2.53 M/UL (ref 4.7–6.1)
SODIUM SERPL-SCNC: 132 MMOL/L (ref 135–145)
UNIT STATUS USTAT: NORMAL
UNIT STATUS USTAT: NORMAL
WBC # BLD AUTO: 7.1 K/UL (ref 4.8–10.8)
WBC # BLD AUTO: 9.2 K/UL (ref 4.8–10.8)

## 2024-02-06 PROCEDURE — 36415 COLL VENOUS BLD VENIPUNCTURE: CPT

## 2024-02-06 PROCEDURE — 86923 COMPATIBILITY TEST ELECTRIC: CPT

## 2024-02-06 PROCEDURE — 700102 HCHG RX REV CODE 250 W/ 637 OVERRIDE(OP): Performed by: INTERNAL MEDICINE

## 2024-02-06 PROCEDURE — 700111 HCHG RX REV CODE 636 W/ 250 OVERRIDE (IP): Performed by: STUDENT IN AN ORGANIZED HEALTH CARE EDUCATION/TRAINING PROGRAM

## 2024-02-06 PROCEDURE — P9016 RBC LEUKOCYTES REDUCED: HCPCS

## 2024-02-06 PROCEDURE — 80048 BASIC METABOLIC PNL TOTAL CA: CPT

## 2024-02-06 PROCEDURE — 770006 HCHG ROOM/CARE - MED/SURG/GYN SEMI*

## 2024-02-06 PROCEDURE — A9270 NON-COVERED ITEM OR SERVICE: HCPCS | Performed by: STUDENT IN AN ORGANIZED HEALTH CARE EDUCATION/TRAINING PROGRAM

## 2024-02-06 PROCEDURE — A9270 NON-COVERED ITEM OR SERVICE: HCPCS | Performed by: INTERNAL MEDICINE

## 2024-02-06 PROCEDURE — 700102 HCHG RX REV CODE 250 W/ 637 OVERRIDE(OP): Performed by: STUDENT IN AN ORGANIZED HEALTH CARE EDUCATION/TRAINING PROGRAM

## 2024-02-06 PROCEDURE — 85027 COMPLETE CBC AUTOMATED: CPT

## 2024-02-06 PROCEDURE — 99233 SBSQ HOSP IP/OBS HIGH 50: CPT | Performed by: INTERNAL MEDICINE

## 2024-02-06 PROCEDURE — 36430 TRANSFUSION BLD/BLD COMPNT: CPT

## 2024-02-06 PROCEDURE — 99232 SBSQ HOSP IP/OBS MODERATE 35: CPT | Performed by: INTERNAL MEDICINE

## 2024-02-06 PROCEDURE — 85025 COMPLETE CBC W/AUTO DIFF WBC: CPT

## 2024-02-06 PROCEDURE — 82962 GLUCOSE BLOOD TEST: CPT | Mod: 91

## 2024-02-06 RX ORDER — SODIUM CHLORIDE 9 MG/ML
INJECTION, SOLUTION INTRAVENOUS CONTINUOUS
Status: ACTIVE | OUTPATIENT
Start: 2024-02-06 | End: 2024-02-06

## 2024-02-06 RX ADMIN — CHOLESTYRAMINE 4 G: 4 POWDER, FOR SUSPENSION ORAL at 17:03

## 2024-02-06 RX ADMIN — OXYCODONE 5 MG: 5 TABLET ORAL at 22:19

## 2024-02-06 RX ADMIN — CALCIUM 500 MG: 500 TABLET ORAL at 07:10

## 2024-02-06 RX ADMIN — INSULIN GLARGINE-YFGN 15 UNITS: 100 INJECTION, SOLUTION SUBCUTANEOUS at 17:24

## 2024-02-06 RX ADMIN — HEPARIN SODIUM 5000 UNITS: 5000 INJECTION, SOLUTION INTRAVENOUS; SUBCUTANEOUS at 05:20

## 2024-02-06 RX ADMIN — HEPARIN SODIUM 5000 UNITS: 5000 INJECTION, SOLUTION INTRAVENOUS; SUBCUTANEOUS at 15:05

## 2024-02-06 RX ADMIN — OXYCODONE HYDROCHLORIDE 10 MG: 10 TABLET ORAL at 01:58

## 2024-02-06 RX ADMIN — CHOLESTYRAMINE 4 G: 4 POWDER, FOR SUSPENSION ORAL at 05:20

## 2024-02-06 RX ADMIN — OMEPRAZOLE 20 MG: 20 CAPSULE, DELAYED RELEASE ORAL at 05:21

## 2024-02-06 RX ADMIN — ACETAMINOPHEN 1000 MG: 500 TABLET ORAL at 05:21

## 2024-02-06 RX ADMIN — ATORVASTATIN CALCIUM 80 MG: 40 TABLET, FILM COATED ORAL at 22:09

## 2024-02-06 RX ADMIN — CALCIUM 500 MG: 500 TABLET ORAL at 17:03

## 2024-02-06 RX ADMIN — ASPIRIN 81 MG: 81 TABLET, COATED ORAL at 05:21

## 2024-02-06 RX ADMIN — HEPARIN SODIUM 5000 UNITS: 5000 INJECTION, SOLUTION INTRAVENOUS; SUBCUTANEOUS at 22:09

## 2024-02-06 ASSESSMENT — ENCOUNTER SYMPTOMS
VOMITING: 0
SHORTNESS OF BREATH: 0
NAUSEA: 0
CHILLS: 0
COUGH: 0
FEVER: 0
WEAKNESS: 1

## 2024-02-06 NOTE — PROGRESS NOTES
Orthopaedic Progress Note    Interval changes:  Patient doing well    LLE dressings CDI  Cleared for DC home by ortho pending medicine clearance    ROS - Patient denies any new issues.  Pain well controlled.    /65   Pulse 72   Temp 36.3 °C (97.4 °F) (Temporal)   Resp 16   Ht 1.829 m (6')   Wt 115 kg (253 lb 4.9 oz)   SpO2 95%     Patient seen and examined  No acute distress  Breathing non labored  RRR  LLE stump protector in place, dressings CDI, no contracture noted.    Recent Labs     02/05/24  0330 02/06/24  0230 02/06/24  0316   WBC 8.0 9.2 7.1   RBC 2.78* 1.73* 2.53*   HEMOGLOBIN 7.9* 5.0* 7.3*   HEMATOCRIT 24.3* 15.6* 22.0*   MCV 87.4 90.2 87.0   MCH 28.4 28.9 28.9   MCHC 32.5 32.1* 33.2   RDW 43.5 43.8 42.5   PLATELETCT 128* 156* 132*   MPV 9.9 9.8 9.7       Active Hospital Problems    Diagnosis     Anemia [D64.9]     Hypocalcemia [E83.51]     Septic shock ([facility]) [A41.9, R65.21]     Soft tissue infection of foot [L08.9]     Unspecified fracture of left toe(s), initial encounter for open fracture [S92.912B]     Osteomyelitis and soft tissue infection of left foot ([facility]) [M86.9]     Type 2 diabetes mellitus with kidney complication, with long-term current use of insulin ([facility]) [E11.29, Z79.4]     Acute renal failure superimposed on stage 3b chronic kidney disease ([facility]) [N17.9, N18.32]        Assessment/Plan:  Patient doing well    LLE dressings CDI  Cleared for DC home by ortho pending medicine clearance  POD#5 S/P  Left below knee amputation  POD#7 S/P   1.  Left fifth ray amputation through metatarsal shaft    2. irrigation and debridement of left plantar foot abscess   Wt bearing status - WBAT  Wound care/Drains - Dressings to be changed every other day by nursing. Or PRN for saturation starting POD#2  Future Procedures - none planned   Sutures/Staples out- 14-21 days post operatively. Removal will completed by ortho mid levels only.  PT/OT-initiated  Antibiotics: copmleted  DVT Prophylaxis-  TEDS/SCDs/Foot pumps/heparin  Farrell-not needed per ortho  Case Coordination for Discharge Planning - Disposition per therapy recs.

## 2024-02-06 NOTE — PROGRESS NOTES
Received report and assumed care of patient at change of shift. Patient is A&Ox4, on RA, and reports pain of 7/10 in his left BKA site at this time, medicated per MAR. Dressing and protector in place to left BKA site. Patient assessment completed, bed in lowest position, and call light and personal belongings are within reach. Patient expressed no further needs at this time.

## 2024-02-06 NOTE — CARE PLAN
The patient is Stable - Low risk of patient condition declining or worsening    Shift Goals  Clinical Goals: DC to rehab, sit up for meals  Patient Goals: DC to rehab  Family Goals: mima    Progress made toward(s) clinical / shift goals:  Pt is Aox4. On RA. Blood glucose monitoring. Pt remains free of falls. No acute distress noted.     Patient is not progressing towards the following goals:

## 2024-02-06 NOTE — PROGRESS NOTES
Nephrology Daily Progress Note    Date of Service  2/6/2024    Chief Complaint  53 y.o. male admitted 1/25/2024 with diabetic foot infection, complicated by AXEL on CKD stage III, required dialysis    Interval Problem Update  Patient has no chest pain or shortness of breath today  He is being evaluated for inpatient rehab    Review of Systems  Review of Systems   Constitutional:  Negative for chills, fever and malaise/fatigue.   Respiratory:  Negative for cough and shortness of breath.    Cardiovascular:  Negative for chest pain and leg swelling.   Gastrointestinal:  Negative for nausea and vomiting.   Genitourinary:  Negative for dysuria, frequency and urgency.   Neurological:  Positive for weakness.        Physical Exam  Temp:  [36 °C (96.8 °F)-36.6 °C (97.9 °F)] 36.5 °C (97.7 °F)  Pulse:  [70-81] 71  Resp:  [15-19] 15  BP: (122-155)/(69-82) 140/77  SpO2:  [94 %-98 %] 95 %    Physical Exam  Vitals and nursing note reviewed.   Constitutional:       General: He is awake.      Appearance: He is not ill-appearing.   HENT:      Head: Normocephalic and atraumatic.      Right Ear: External ear normal.      Left Ear: External ear normal.      Nose: Nose normal.      Mouth/Throat:      Pharynx: No oropharyngeal exudate or posterior oropharyngeal erythema.   Eyes:      General:         Right eye: No discharge.         Left eye: No discharge.      Conjunctiva/sclera: Conjunctivae normal.   Cardiovascular:      Rate and Rhythm: Normal rate and regular rhythm.   Pulmonary:      Effort: Pulmonary effort is normal. No respiratory distress.      Breath sounds: Normal breath sounds. No wheezing.   Abdominal:      General: Abdomen is flat. Bowel sounds are normal.   Musculoskeletal:         General: No tenderness.      Cervical back: No rigidity. No muscular tenderness.      Right lower leg: No edema.      Left lower leg: No edema.   Skin:     General: Skin is warm and dry.      Coloration: Skin is not jaundiced.      Findings: No  "lesion or rash.   Neurological:      General: No focal deficit present.      Mental Status: He is alert and oriented to person, place, and time. Mental status is at baseline.   Psychiatric:         Mood and Affect: Mood normal.         Behavior: Behavior normal.         Thought Content: Thought content normal.         Fluids    Intake/Output Summary (Last 24 hours) at 2/6/2024 1401  Last data filed at 2/6/2024 1000  Gross per 24 hour   Intake 599.17 ml   Output 1300 ml   Net -700.83 ml       Laboratory  Recent Labs     02/05/24  0330 02/06/24  0230 02/06/24  0316   WBC 8.0 9.2 7.1   RBC 2.78* 1.73* 2.53*   HEMOGLOBIN 7.9* 5.0* 7.3*   HEMATOCRIT 24.3* 15.6* 22.0*   MCV 87.4 90.2 87.0   MCH 28.4 28.9 28.9   MCHC 32.5 32.1* 33.2   RDW 43.5 43.8 42.5   PLATELETCT 128* 156* 132*   MPV 9.9 9.8 9.7     Recent Labs     02/04/24  0309 02/05/24  0330 02/06/24  0230   SODIUM 130* 128* 132*   POTASSIUM 4.1 4.2 4.2   CHLORIDE 99 98 100   CO2 23 23 25   GLUCOSE 246* 191* 222*   BUN 26* 32* 24*   CREATININE 3.05* 3.32* 2.87*   CALCIUM 6.5* 6.6* 6.5*         No results for input(s): \"NTPROBNP\" in the last 72 hours.        Imaging  MR-FOOT-W/O LEFT   Final Result      1.  Marrow edema involving the fifth metatarsal head with also seen marrow edema and fragmentation of the fifth proximal phalanx and marrow edema involving the middle and distal phalanges. This most likely represents osteomyelitis with pathologic    fracture. There is surrounding cellulitis.      2.  Mild marrow edema involving the fourth metatarsal head and the fourth proximal phalanx possibly reactive in nature versus osteomyelitis.      3.  Edema/induration of the soft tissues of the forefoot, midfoot and hindfoot with also seen involvement of the intrinsic muscles of the foot likely representing cellulitis and myositis. There are punctate areas of gas within the soft tissues of the    forefoot and midfoot which may indicate areas of tissue necrosis.      4.  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      IR-SABA,GROABELINO PLACEMENT >5   Final Result      1. ULTRASOUND AND FLUOROSCOPIC GUIDED PLACEMENT OF A Right INTERNAL JUGULAR 14.5 Polish GLIDE PATH TUNNELED DIALYSIS CATHETER.      2. THE HEMODIALYSIS CATHETER MAY BE USED IMMEDIATELY AS CLINICALLY INDICATED. FLUSHES PER PROTOCOL.         US-FLOWER SINGLE LEVEL BILAT   Final Result      DX-CHEST-PORTABLE (1 VIEW)   Final Result      CT-FOOT W/O PLUS RECONS LEFT   Final Result      1.  Findings are consistent with osteomyelitis and pathologic fracture involving the proximal phalanx of the fifth digit.      2.  Soft tissue swelling and emphysema is present around the fifth digit and in the plantar soft tissues of the foot. These findings are consistent with cellulitis.      DX-FOOT-COMPLETE 3+ LEFT   Final Result      1.  Comminuted fracture proximal phalanx fifth digit is identified which could be pathologic and related to osteomyelitis.      2.  Soft tissue swelling and emphysema in the fifth digit which could be due to cellulitis.      DX-CHEST-PORTABLE (1 VIEW)   Final Result         No acute cardiac or pulmonary abnormality is identified.            Assessment/Plan  1 AXEL on CKD stage IIIb: Etiology is most likely ATN, nonoliguric, possible early renal recovery  2 septic shock  3 anemia  4 osteomyelitis  no acute need for HD, evaluate daily to evaluate renal recovery  Consider 24-hour urine collection  Renal diet  Daily BMP, CBC.  Renal dose all meds  Avoid nephrotoxins like NSAIDs.  Rule out blood loss  Erythropoietin  Prognosis guarded.

## 2024-02-06 NOTE — HOSPITAL COURSE
53 y.o. 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  X-ray revealed pathologic fracture and was consistent with osteomyelitis with gas in the soft tissues.  WBC count was 24.7 thousand.  His creatinine was quite elevated at 6.36.  He was hypotensive.  He was placed on IV Zosyn and Zyvox, along with IV fluids.  Orthopedics and nephrology were consulted.  Septic shock has improved, and patient subsequently underwent left fifth ray amputation through metatarsal shaft with irrigation and debridement of left plantar foot abscess. Due to ongoing elevated creatinine, a tunneled dialysis catheter was placed and patient started on hemodialysis on 1/31/2024.  Surgical cultures remain negative.  Blood cultures remain negative.  Patient underwent through left BKA on 2/1/2024 by Dr. Segura.  Patient had session of hemodialysis today on 2/5.  Per physical therapy evaluation on 2/25 he is not safe to go home and recommending SNF.  Consulted PMR Dr. Quintero will evaluate for further recommendation

## 2024-02-06 NOTE — DISCHARGE PLANNING
Spoke with patient to discuss IPR. Answered patient's questions and discussed expectations for IPR at [facility]. Patient reports his wife is not really involved at this time and his primary support will be his parents. Patient provided contact information for mother Annette [phone].    Contacted mother who confirmed plan after rehab is for patient to live with her and patient's father in a [facility] with [address] [facility] ([address] [facility], [facility]). Patient is having a wheelchair delivered today and home is wheelchair accessible. Both parents are retired and able to provide 24/7 assistance. Mother reports she's comfortable assisting patient with toielting/bathing. Mother reports no additional needs at this time.    [facility] will continue to follow for medical clearance, monitoring hgb s/p transfusion this morning.

## 2024-02-06 NOTE — PROGRESS NOTES
Hospital Medicine Daily Progress Note    Date of Service  2/5/2024    Chief Complaint  Beni Moore is a 53 y.o. male admitted 1/25/2024 with toe infection    Hospital Course     53 y.o. 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  X-ray revealed pathologic fracture and was consistent with osteomyelitis with gas in the soft tissues.  WBC count was 24.7 thousand.  His creatinine was quite elevated at 6.36.  He was hypotensive.  He was placed on IV Zosyn and Zyvox, along with IV fluids.  Orthopedics and nephrology were consulted.  Septic shock has improved, and patient subsequently underwent left fifth ray amputation through metatarsal shaft with irrigation and debridement of left plantar foot abscess. Due to ongoing elevated creatinine, a tunneled dialysis catheter was placed and patient started on hemodialysis on 1/31/2024.  Surgical cultures remain negative.  Blood cultures remain negative.  Patient underwent through left BKA on 2/1/2024 by Dr. Segura.  Patient had session of hemodialysis today on 2/5.  Per physical therapy evaluation on 2/25 he is not safe to go home and recommending SNF.  Consulted PMR Dr. Quintero will evaluate for further recommendation      Interval Problem Update    2/5/2024  Vitals remained stable  On room air saturating 90%  Patient had session of hemodialysis today  Labs reviewed, normal white count, hemoglobin 7.9, sodium 128, calcium 6.6    Discussed with physical therapist Janet. Per physical therapy evaluation on 2/25 he is not safe to go home and recommending SNF.    Discussed with PMR Dr. Prince regarding inpatient rehab placement  Discussed with  nephrology Dr. Najjar      Received 1 unit PRBC transfusion on 2/4, H&H remained stable  Blood glucose remained remained stable on 40 units Lantus  On IV narcotics for pain management, monitor for toxicity  Repeat BMP in a.m. to monitor electrolytes and renal function   repeat CBC in a.m. to monitor white count and hemoglobin.  PT/OT recommending SNF.      I rounded second time on this patient.  Discussed with him regarding physical therapy recommendation.  Now he is agreeing for rehab.  He is concerned that he is unable to walk due to ongoing edema.  Patient requesting to be on Lasix.  I informed him he  is not a candidate for Lasix given low urine output.  Edema will be improved eventually with hemodialysis.  This concern was discussed with nephrology Dr. Najjar      I have discussed this patient's plan of care and discharge plan at IDT rounds today with Case Management, Nursing, Nursing leadership, and other members of the IDT team.    Consultants/Specialty  nephrology and orthopedics    Code Status  Full Code    Disposition  The patient is not medically cleared for discharge to home or a post-acute facility.  Anticipate discharge to: an inpatient rehabilitation hospital    I have placed the appropriate orders for post-discharge needs.    Review of Systems  Review of Systems   Musculoskeletal:         Left BKA site pain        Physical Exam  Temp:  [35.9 °C (96.7 °F)-36.6 °C (97.8 °F)] 35.9 °C (96.7 °F)  Pulse:  [69-76] 76  Resp:  [18-19] 18  BP: (119-153)/(62-78) 153/78  SpO2:  [90 %-97 %] 96 %    Physical Exam  Constitutional:       Appearance: Normal appearance.   HENT:      Mouth/Throat:      Mouth: Mucous membranes are dry.   Cardiovascular:      Rate and Rhythm: Normal rate and regular rhythm.   Pulmonary:      Effort: Pulmonary effort is normal.      Breath sounds: Normal breath sounds.   Abdominal:      General: There is no distension.   Musculoskeletal:      Comments: Left BKA, wrapped with clean  dressing   Neurological:      General: No focal deficit present.      Mental Status: He is alert and oriented to person, place, and time.   Psychiatric:         Mood and Affect: Mood normal.         Fluids    Intake/Output Summary (Last 24 hours) at 2/5/2024 1739  Last data filed at 2/5/2024 1644  Gross per 24 hour   Intake 500 ml   Output 3400 ml   Net -2900 ml         Laboratory  Recent Labs     02/04/24  0309 02/04/24  1510 02/05/24  0330   WBC 7.8 8.3 8.0   RBC 2.42* 2.66* 2.78*   HEMOGLOBIN 6.9* 7.4* 7.9*   HEMATOCRIT 21.9* 23.5* 24.3*   MCV 90.5 88.3 87.4   MCH 28.5 27.8 28.4   MCHC 31.5* 31.5* 32.5   RDW 45.2 44.2 43.5   PLATELETCT 112* 119* 128*   MPV 9.9 9.6 9.9       Recent Labs     02/03/24  0240 02/03/24  1300 02/04/24  0309 02/05/24  0330   SODIUM 132*  --  130* 128*   POTASSIUM 4.1 4.1 4.1 4.2   CHLORIDE 99  --  99 98   CO2 24  --  23 23   GLUCOSE 197*  --  246* 191*   BUN 21  --  26* 32*   CREATININE 2.60*  --  3.05* 3.32*   CALCIUM 6.6*  --  6.5* 6.6*                     Imaging  MR-FOOT-W/O LEFT   Final Result      1.  Marrow edema involving the fifth metatarsal head with also seen marrow edema and fragmentation of the fifth proximal phalanx and marrow edema involving the middle and distal phalanges. This most likely represents osteomyelitis with pathologic    fracture. There is surrounding cellulitis.      2.  Mild marrow edema involving the fourth metatarsal head and the fourth proximal phalanx possibly reactive in nature versus osteomyelitis.      3.  Edema/induration of the soft tissues of the forefoot, midfoot and hindfoot with also seen involvement of the intrinsic muscles of the foot likely representing cellulitis and myositis. There are punctate areas of gas within the soft tissues of the    forefoot and midfoot which may indicate areas of tissue necrosis.      4.  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      IR-SABA,GROSHONG PLACEMENT >5   Final Result      1. ULTRASOUND  AND FLUOROSCOPIC GUIDED PLACEMENT OF A Right INTERNAL JUGULAR 14.5 [place] GLIDE PATH TUNNELED DIALYSIS CATHETER.      2. THE HEMODIALYSIS CATHETER MAY BE USED IMMEDIATELY AS CLINICALLY INDICATED. FLUSHES PER PROTOCOL.         US-FLOWER SINGLE LEVEL BILAT   Final Result      DX-CHEST-PORTABLE (1 VIEW)   Final Result      CT-FOOT W/O PLUS RECONS LEFT   Final Result      1.  Findings are consistent with osteomyelitis and pathologic fracture involving the proximal phalanx of the fifth digit.      2.  Soft tissue swelling and emphysema is present around the fifth digit and in the plantar soft tissues of the foot. These findings are consistent with cellulitis.      DX-FOOT-COMPLETE 3+ LEFT   Final Result      1.  Comminuted fracture proximal phalanx fifth digit is identified which could be pathologic and related to osteomyelitis.      2.  Soft tissue swelling and emphysema in the fifth digit which could be due to cellulitis.      DX-CHEST-PORTABLE (1 VIEW)   Final Result         No acute cardiac or pulmonary abnormality is identified.           Assessment/Plan  * Septic shock (HCC)- (present on admission)  Assessment & Plan  Resolved    Anemia  Assessment & Plan  Transfuse 1 unit PRBC to keep hemoglobin above 7  Need close monitoring for low H&H.  Currently no sign of active sign of bleeding.  Anemia likely from CKD.  Repeat iron panel    2/5/2024  No concern for underlying iron deficiency anemia  H&H remained stable now stable      Hypocalcemia- (present on admission)  Assessment & Plan  Ionized calcium is around 1  Asymptomatic for hypocalcemia  Check vitamin D level  On calcium oxalate    Soft tissue infection of foot- (present on admission)  Assessment & Plan  As above    Osteomyelitis and soft tissue infection of left foot (HCC)- (present on admission)  Assessment & Plan  Noted on CT scan and MRI.  Has extensive soft tissue infection with osteomyelitis.  Orthopedics on board, s/p left fifth ray amputation through  metatarsal shaft with irrigation and debridement of left plantar foot abscess.  Orthopedics planning for BKA today 2/1/2024.  Completed antibiotics.  Post BKA, will have achieved adequate source control and has no further antibiotic needs.  Continue pain control.    Type 2 diabetes mellitus with kidney complication, with long-term current use of insulin ([facility])- (present on admission)  Assessment & Plan  HbA1c 7.2.  Continue sliding scale insulin coverage while in house.  Holding long-acting insulin given now that he is hemodialysis dependent and at risk for insulin stacking/accumulation.  Accu-Cheks before meals and at bedtime. Goal to keep BG between 140-180 per 2019 ADA guidelines.  Continue diabetic diet.      Acute renal failure superimposed on stage 3b chronic kidney disease (HCC)- (present on admission)  Assessment & Plan  Patient now started on hemodialysis 1/29/2024.  Nephrology following.  Outpatient hemodialysis chair arranged.           VTE prophylaxis: Heparin subcu    I have performed a physical exam and reviewed and updated ROS and Plan today (2/5/2024). In review of yesterday's note (2/4/2024), there are no changes except as documented above.             Greater than 51 minutes spent preparing to see patient (e.g. review of tests) obtaining and/or reviewing separately obtained history. Performing a medically appropriate examination and/ evaluation.  Counseling and educating the patient/family/caregiver.  Ordering medications, tests, or procedures.  Referring and communicating with other health care professionals.  Documenting clinical information in EPIC.  Independently interpreting results and communicating results to patient/family/caregiver.  Care coordination.

## 2024-02-06 NOTE — PROGRESS NOTES
NOC HOSPITALIST CROSS COVER    Notified by RN regarding hemoglobin of 5.0.  Orders placed for 1 unit PRBC, repeat CBC.  During transfusion of PRBC redraw resulted at 7.3.  Initial hemoglobin of 5.0 appears to be a lab error.      -----------------------------------------------------------------------------------------------------------    Electronically signed by:  LISA Lorenzo PA-C  Hospitalist Services

## 2024-02-06 NOTE — CARE PLAN
The patient is Stable - Low risk of patient condition declining or worsening    Shift Goals  Clinical Goals: Patient will receive pain medication as needed, which will adequately reduce his pain level to his comfort goal of being able to sleep comfortably. Patient will have his blood sugar checked at bedtime and will receive insulin according to his sliding scale.  Patient Goals: Patient will sleep comfortably throughout the night.    Progress made toward(s) clinical / shift goals:  Patient received pain medication as needed and was able to rest comfortably in bed. Patient had his blood sugar checked at bedtime and received 3 units of insulin per his sliding scale.     Patient is not progressing towards the following goals:

## 2024-02-06 NOTE — PROGRESS NOTES
Julio Cesar from Lab called with critical result of Hgb of 5.0 and Hct of 15.6 at 0303. Critical lab result read back to Julio Cesar.   Pato from Lab called with critical result of calcium of 6.5 at 0305. Critical lab result read back to Pato.   Syd Lorenzo notified of critical lab result at 0306.  Critical lab result read back by Syd Lorenzo.

## 2024-02-07 LAB
ALBUMIN SERPL BCP-MCNC: 1.5 G/DL (ref 3.2–4.9)
ANION GAP SERPL CALC-SCNC: 7 MMOL/L (ref 7–16)
BUN SERPL-MCNC: 27 MG/DL (ref 8–22)
CALCIUM ALBUM COR SERPL-MCNC: 9 MG/DL (ref 8.5–10.5)
CALCIUM SERPL-MCNC: 7 MG/DL (ref 8.5–10.5)
CHLORIDE SERPL-SCNC: 102 MMOL/L (ref 96–112)
CO2 SERPL-SCNC: 23 MMOL/L (ref 20–33)
CREAT SERPL-MCNC: 3.13 MG/DL (ref 0.5–1.4)
ERYTHROCYTE [DISTWIDTH] IN BLOOD BY AUTOMATED COUNT: 43.3 FL (ref 35.9–50)
GFR SERPLBLD CREATININE-BSD FMLA CKD-EPI: 23 ML/MIN/1.73 M 2
GLUCOSE BLD STRIP.AUTO-MCNC: 180 MG/DL (ref 65–99)
GLUCOSE BLD STRIP.AUTO-MCNC: 194 MG/DL (ref 65–99)
GLUCOSE BLD STRIP.AUTO-MCNC: 196 MG/DL (ref 65–99)
GLUCOSE BLD STRIP.AUTO-MCNC: 201 MG/DL (ref 65–99)
GLUCOSE SERPL-MCNC: 211 MG/DL (ref 65–99)
HCT VFR BLD AUTO: 27.4 % (ref 42–52)
HGB BLD-MCNC: 9.2 G/DL (ref 14–18)
MCH RBC QN AUTO: 29.3 PG (ref 27–33)
MCHC RBC AUTO-ENTMCNC: 33.6 G/DL (ref 32.3–36.5)
MCV RBC AUTO: 87.3 FL (ref 81.4–97.8)
PHOSPHATE SERPL-MCNC: 2.5 MG/DL (ref 2.5–4.5)
PLATELET # BLD AUTO: 133 K/UL (ref 164–446)
PMV BLD AUTO: 10.1 FL (ref 9–12.9)
POTASSIUM SERPL-SCNC: 4.3 MMOL/L (ref 3.6–5.5)
RBC # BLD AUTO: 3.14 M/UL (ref 4.7–6.1)
SODIUM SERPL-SCNC: 132 MMOL/L (ref 135–145)
WBC # BLD AUTO: 7.3 K/UL (ref 4.8–10.8)

## 2024-02-07 PROCEDURE — A9270 NON-COVERED ITEM OR SERVICE: HCPCS | Performed by: STUDENT IN AN ORGANIZED HEALTH CARE EDUCATION/TRAINING PROGRAM

## 2024-02-07 PROCEDURE — 80069 RENAL FUNCTION PANEL: CPT

## 2024-02-07 PROCEDURE — 770006 HCHG ROOM/CARE - MED/SURG/GYN SEMI*

## 2024-02-07 PROCEDURE — 700102 HCHG RX REV CODE 250 W/ 637 OVERRIDE(OP): Performed by: INTERNAL MEDICINE

## 2024-02-07 PROCEDURE — 700111 HCHG RX REV CODE 636 W/ 250 OVERRIDE (IP): Performed by: STUDENT IN AN ORGANIZED HEALTH CARE EDUCATION/TRAINING PROGRAM

## 2024-02-07 PROCEDURE — 82962 GLUCOSE BLOOD TEST: CPT

## 2024-02-07 PROCEDURE — 700111 HCHG RX REV CODE 636 W/ 250 OVERRIDE (IP): Mod: JZ,JG | Performed by: INTERNAL MEDICINE

## 2024-02-07 PROCEDURE — 700102 HCHG RX REV CODE 250 W/ 637 OVERRIDE(OP): Performed by: STUDENT IN AN ORGANIZED HEALTH CARE EDUCATION/TRAINING PROGRAM

## 2024-02-07 PROCEDURE — 99233 SBSQ HOSP IP/OBS HIGH 50: CPT | Performed by: INTERNAL MEDICINE

## 2024-02-07 PROCEDURE — 85027 COMPLETE CBC AUTOMATED: CPT

## 2024-02-07 PROCEDURE — 90935 HEMODIALYSIS ONE EVALUATION: CPT

## 2024-02-07 PROCEDURE — A9270 NON-COVERED ITEM OR SERVICE: HCPCS | Performed by: INTERNAL MEDICINE

## 2024-02-07 PROCEDURE — 700111 HCHG RX REV CODE 636 W/ 250 OVERRIDE (IP)

## 2024-02-07 PROCEDURE — 90935 HEMODIALYSIS ONE EVALUATION: CPT | Performed by: INTERNAL MEDICINE

## 2024-02-07 RX ORDER — HEPARIN SODIUM 1000 [USP'U]/ML
INJECTION, SOLUTION INTRAVENOUS; SUBCUTANEOUS
Status: COMPLETED
Start: 2024-02-07 | End: 2024-02-07

## 2024-02-07 RX ADMIN — HEPARIN SODIUM 5000 UNITS: 5000 INJECTION, SOLUTION INTRAVENOUS; SUBCUTANEOUS at 13:14

## 2024-02-07 RX ADMIN — OXYCODONE HYDROCHLORIDE 10 MG: 10 TABLET ORAL at 19:04

## 2024-02-07 RX ADMIN — HEPARIN SODIUM 5000 UNITS: 5000 INJECTION, SOLUTION INTRAVENOUS; SUBCUTANEOUS at 06:12

## 2024-02-07 RX ADMIN — EPOETIN ALFA-EPBX 5000 UNITS: 3000 INJECTION, SOLUTION INTRAVENOUS; SUBCUTANEOUS at 10:47

## 2024-02-07 RX ADMIN — OMEPRAZOLE 20 MG: 20 CAPSULE, DELAYED RELEASE ORAL at 06:12

## 2024-02-07 RX ADMIN — CALCIUM 500 MG: 500 TABLET ORAL at 07:23

## 2024-02-07 RX ADMIN — HEPARIN SODIUM 5000 UNITS: 5000 INJECTION, SOLUTION INTRAVENOUS; SUBCUTANEOUS at 21:30

## 2024-02-07 RX ADMIN — CALCIUM 500 MG: 500 TABLET ORAL at 16:57

## 2024-02-07 RX ADMIN — INSULIN GLARGINE-YFGN 15 UNITS: 100 INJECTION, SOLUTION SUBCUTANEOUS at 16:57

## 2024-02-07 RX ADMIN — HEPARIN SODIUM 3600 UNITS: 1000 INJECTION, SOLUTION INTRAVENOUS; SUBCUTANEOUS at 10:51

## 2024-02-07 RX ADMIN — ASPIRIN 81 MG: 81 TABLET, COATED ORAL at 06:12

## 2024-02-07 RX ADMIN — CHOLESTYRAMINE 4 G: 4 POWDER, FOR SUSPENSION ORAL at 16:57

## 2024-02-07 RX ADMIN — ATORVASTATIN CALCIUM 80 MG: 40 TABLET, FILM COATED ORAL at 21:20

## 2024-02-07 RX ADMIN — CHOLESTYRAMINE 4 G: 4 POWDER, FOR SUSPENSION ORAL at 06:15

## 2024-02-07 NOTE — DISCHARGE SUMMARY
Discharge Summary    CHIEF COMPLAINT ON ADMISSION  Chief Complaint   Patient presents with    Wound Check    Blood Pressure Problem       Reason for Admission  EMS     Admission Date  1/25/2024    CODE STATUS  Full Code    HPI & HOSPITAL COURSE     53 y.o. 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  X-ray revealed pathologic fracture and was consistent with osteomyelitis with gas in the soft tissues.  WBC count was 24.7 thousand.  His creatinine was quite elevated at 6.36.  He was hypotensive.  He was placed on IV Zosyn and Zyvox, along with IV fluids.  Orthopedics and nephrology were consulted.  Septic shock has improved, and patient subsequently underwent left fifth ray amputation through metatarsal shaft with irrigation and debridement of left plantar foot abscess.  Completed course of antibiotic, no further antibiotic required due to adequate source control.  Due to ongoing elevated creatinine, a tunneled dialysis catheter was placed and patient started on hemodialysis on 1/31/2024.  Surgical cultures remain negative.  Blood cultures remain negative.  Patient underwent through left BKA on 2/1/2024 by Dr. Segura.  Patient requiring 1 unit PRBC for anemia likely due to blood loss from surgery and CKD.  Patient declined SNF, home health.  Reports he has someone coming at home for prostatic leg.  Wheelchair has been ordered.    On the day of discharge patient was seen and examined at bedside.  Vital signs stable, on room air saturating over 90%, no discomfort.     Labs reviewed H&H remained stable around 7.9, chemistry with chronic hyponatremia sodium 138.  Asymptomatic hypocalcemia.   "Insulin detemir adjusted.  Case was discussed with nephrology cleared for discharge from nephrology standpoint with outpatient hemodialysis.  He will go for dialysis on Monday Wednesday Friday.  Orthopedics cleared for discharge.  He will follow-up with Ortho.  Discharge    At the time of discharge patient remain hemodynamically stable ,asymptomatic ,labs remain unremarkable .  Advised to repeat labs with dialysis on Wednesday.  Patient will be discharged with close follow up with PCP .Discharge plan was discussed with patient in details .  Patient agreed with discharge plan  and  all questions answered.\"    Dr. Sanders 2/6/2024    Update: Bed available today for transfer to rehab.      Therefore, he is discharged in good and stable condition to home with close outpatient follow-up.    The patient met 2-midnight criteria for an inpatient stay at the time of discharge.    Discharge Date  2/8/2024          DISCHARGE DIAGNOSES  Principal Problem:    Septic shock, diabetic foot infection, CKD on dialysis (HCC) (POA: Yes)  Active Problems:    Acute renal failure superimposed on stage 3b chronic kidney disease (HCC) (POA: Yes)    Type 2 diabetes mellitus with kidney complication, with long-term current use of insulin (HCC) (POA: Yes)    Class 1 obesity due to excess calories with body mass index (BMI) of 34.0 to 34.9 in adult (POA: Unknown)    Osteomyelitis and soft tissue infection of left foot (HCC) (POA: Yes)    Soft tissue infection of foot (POA: Yes)    Unspecified fracture of left toe(s), initial encounter for open fracture (POA: Yes)    Hypocalcemia (POA: Yes)    Anemia (POA: Unknown)  Resolved Problems:    Normocytic anemia (POA: Yes)      FOLLOW UP  Future Appointments   Date Time Provider Department Center   5/20/2024  1:20 PM JESENIA Alves M.D.  [address] [facility] [address]  [phone]    Follow up in 1 week(s)    Follow up with Florinda Pascual M.D. or attending at " rehab upon receipt Follow up with Dr. Segura, Orthopedics postop visit in 1-2 weeks Follow up with [facility] in 1 week NURSING provide resources/pamphlets for Diabetes (Advise Beni Moore to check blood glucoses at home and have a log for primary provider to review. Follow diabetic diet. Ask for and review [facility] Diabetic handbook and pamphlets.), obesity (Recommended weight loss advised, 5% through reduced calorie, low carb diet and 150 mins of exercise a week once better Recommend bariatric surgery evaluation if morbidly obese Educated on the increase of morbidity and mortality associated with excess weight including DM, Heart Disease, HTN, stroke, and sleep apnea. Recommended outpatient monitoring  of blood sugars, lipid panel, sleep study evaluation for metabolic syndrome.), postop instructions from Orthopedic surgeon    MEDICATIONS ON DISCHARGE     Medication List        START taking these medications        Instructions   calcium carbonate 500 MG Tabs  Commonly known as: Os-Quinton 500   Take 1 Tablet by mouth 2 times a day with meals for 30 days.  Dose: 500 mg            CONTINUE taking these medications        Instructions   aspirin EC 81 MG Tbec  Commonly known as: Ecotrin   Take 1 Tablet by mouth every day.  Dose: 81 mg     atorvastatin 80 MG tablet  Commonly known as: Lipitor   Take 1 Tablet by mouth every evening.  Dose: 80 mg     carvedilol 3.125 MG Tabs  Commonly known as: Coreg   TAKE 1 TABLET TWICE A DAY WITH MEALS  Dose: 3.125 mg     cholestyramine 4 g packet  Commonly known as: Questran   Take 1 Packet by mouth 2 times a day. 1000 + 1700  Dose: 1 Packet     enalapril 20 MG tablet  Commonly known as: Vasotec   TAKE ONE-HALF (1/2) TABLET TWICE A DAY  Dose: 10 mg     HumuLIN R 100 Unit/mL Soln  Generic drug: insulin regular   Inject 4-8 Units under the skin 3 times a day before meals. Sliding Scale   150 - 200 = 3 units  201 - 250 = 4 units  251 - 300 = 5 units  301 - 350 = 6 units  351 -  400 = 7 units   PLUS CARB CORRECTION  of 1 unit for every 15 g carb  Dose: 4-8 Units     Lucentis 0.3 MG/0.05ML Soln  Generic drug: Ranibizumab   1 Dose by Intravitreal route see administration instructions. Every 6-8 weeks  Dose: 1 Dose     omeprazole 20 MG delayed-release capsule  Commonly known as: PriLOSEC   Take 1 Capsule by mouth every morning.  Dose: 20 mg     sodium bicarbonate 650 MG Tabs  Commonly known as: Sodium Bicarbonate   Take 1 Tablet by mouth in the morning, at noon, and at bedtime.  Dose: 650 mg            STOP taking these medications      furosemide 40 MG Tabs  Commonly known as: Lasix     oseltamivir 30 MG Caps  Commonly known as: Tamiflu     Tresiba FlexTouch 100 UNIT/ML Sopn  Generic drug: Insulin Degludec              Allergies  No Known Allergies    DIET  Orders Placed This Encounter   Procedures    Diet Order Diet: Consistent CHO (Diabetic); Second Modifier: (optional): Renal     Standing Status:   Standing     Number of Occurrences:   1     Order Specific Question:   Diet:     Answer:   Consistent CHO (Diabetic) [4]     Order Specific Question:   Second Modifier: (optional)     Answer:   Renal [8]       ACTIVITY  As tolerated.  Weight bearing as tolerated    CONSULTATIONS  Ortho     PROCEDURES  MR-FOOT-W/O LEFT   Final Result      1.  Marrow edema involving the fifth metatarsal head with also seen marrow edema and fragmentation of the fifth proximal phalanx and marrow edema involving the middle and distal phalanges. This most likely represents osteomyelitis with pathologic    fracture. There is surrounding cellulitis.      2.  Mild marrow edema involving the fourth metatarsal head and the fourth proximal phalanx possibly reactive in nature versus osteomyelitis.      3.  Edema/induration of the soft tissues of the forefoot, midfoot and hindfoot with also seen involvement of the intrinsic muscles of the foot likely representing cellulitis and myositis. There are punctate areas of gas within  the soft tissues of the    forefoot and midfoot which may indicate areas of tissue necrosis.      4.  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      IR-BRADLEY SABA PLACEMENT >5   Final Result      1. ULTRASOUND AND FLUOROSCOPIC GUIDED PLACEMENT OF A Right INTERNAL JUGULAR 14.5 [place] GLIDE PATH TUNNELED DIALYSIS CATHETER.      2. THE HEMODIALYSIS CATHETER MAY BE USED IMMEDIATELY AS CLINICALLY INDICATED. FLUSHES PER PROTOCOL.         US-FLOWER SINGLE LEVEL BILAT   Final Result      DX-CHEST-PORTABLE (1 VIEW)   Final Result      CT-FOOT W/O PLUS RECONS LEFT   Final Result      1.  Findings are consistent with osteomyelitis and pathologic fracture involving the proximal phalanx of the fifth digit.      2.  Soft tissue swelling and emphysema is present around the fifth digit and in the plantar soft tissues of the foot. These findings are consistent with cellulitis.      DX-FOOT-COMPLETE 3+ LEFT   Final Result      1.  Comminuted fracture proximal phalanx fifth digit is identified which could be pathologic and related to osteomyelitis.      2.  Soft tissue swelling and emphysema in the fifth digit which could be due to cellulitis.      DX-CHEST-PORTABLE (1 VIEW)   Final Result         No acute cardiac or pulmonary abnormality is identified.             LABORATORY  Lab Results   Component Value Date    SODIUM 132 (L) 02/07/2024    POTASSIUM 4.3 02/07/2024    CHLORIDE 102 02/07/2024    CO2 23 02/07/2024    GLUCOSE 211 (H) 02/07/2024    BUN 27 (H) 02/07/2024    CREATININE 3.13 (H) 02/07/2024        Lab Results   Component Value Date    WBC 7.3 02/07/2024    HEMOGLOBIN 9.2 (L) 02/07/2024    HEMATOCRIT 27.4 (L) 02/07/2024    PLATELETCT 133 (L) 02/07/2024        Total time of the discharge process exceeds 40  minutes.

## 2024-02-07 NOTE — PROGRESS NOTES
3hr HD stated @ 0741 and completed @ 1048,tx well tolerated,VSS,net UF - 3500ml.RIJ TDC dressing CDI,report given to Yamilet Medina RN.

## 2024-02-07 NOTE — CARE PLAN
The patient is Stable - Low risk of patient condition declining or worsening    Shift Goals  Clinical Goals: Patient will recieve medication for pain to achieve pain goal of 3/10.  Patient Goals: Patient will rest comfortably throughout the shift.      Progress made toward(s) clinical / shift goals:  Patient medicated during the shift for pain as needed. Patient will rest comfortably for the remainder of the shift.     Patient is not progressing towards the following goals:

## 2024-02-07 NOTE — DISCHARGE PLANNING
0950: Per attending patient is medically cleared. Pending bed availability at [facility], [facility] will continue to follow.    1525: Patient has been accepted by Dr Ny at [facility]. Transport set for tomorrow 2/8 @ 13/1330 GMT w/c, nurse report [id number]. Notified care team.   normal... Well appearing, well nourished, awake, alert, oriented to person, place, time/situation and in no apparent distress. Mildly uncomfortable due to pain

## 2024-02-07 NOTE — PREADMISSION SCREENING NOTE
Pre-Admission Screening Form    Patient Information:   Name: Beni Moore     MRN: [id number]       : 1970      Age: 53 y.o.   Gender: male      Race: White [7]       Marital Status:  [2]  Family Contact: Alvarez Moore        Relationship: Spouse [17]  Home Phone:            Cell Phone: [phone]  Advanced Directives: None  Code Status:  FULL  Current Attending Provider: Selvin Haji M.D.  Referring Physician: Dr. Sanders      Physiatrist Consult: Dr. Quintero       Referral Date: 24  Primary Payor Source:  MEDICARE  Secondary Payor Source:      Medical Information:   Date of Admission to Acute Care [place]2024  Room Number: S533/02  Rehabilitation Diagnosis: 0005.4 - Amputation: Unilateral Lower Limb Below the Knee (BK)  Immunization History   Administered Date(s) Administered    Influenza Vaccine Quad Inj (Pf) 10/31/2019, 2022    Influenza Vaccine Quad Inj (Preserved) 10/31/2019    Pneumococcal Conjugate Vaccine (PCV20) 2022    Tdap Vaccine 2018     No Known Allergies  Past Medical History:   Diagnosis Date    Anesthesia     Hypotension post-op, persisted for a few months    Anesthesia 2023    Patient stated after  colonoscopy he became hypotensive.    Bowel habit changes     History GI problems    Cataract     IOL - Left eye    Cataract     IOL OD    Deaf, right     Diabetes (HCC)     Oral medications & insulin    Heart burn     GERD; takes Prilosec    Hemorrhagic disorder (HCC)     ASA protection for stent and cardiac history    History of non-ST elevation myocardial infarction (NSTEMI)     Per Problem List    Hx of heart artery stent     Hyperlipidemia     Hyperparathyroidism due to renal insufficiency (HCC)     Per Problem List    Hypertension     Myocardial infarct (HCC)     Pneumonia     H/O    Renal disorder 2023    Stage 3 CKD     Past Surgical History:   Procedure Laterality Date    KNEE AMPUTATION BELOW Left 2024    Procedure:  AMPUTATION, BELOW KNEE;  Surgeon: Celestino Segura M.D.;  Location: [facility];  Service: Orthopedics    TOE AMPUTATION Left 1/30/2024    Procedure: AMPUTATION, TOE- 5TH;  Surgeon: Celestino Segura M.D.;  Location: [facility];  Service: Orthopedics    PB OPEN TREATMENT PBOX HUMERAL FRACTURE Left 12/11/2023    Procedure: LEFT OPEN REDUCTION INTERNAL FIXATION  HUMERUS, PROXIMAL AND LEFT BICEPS TENODESIS;  Surgeon: Aris Pierre M.D.;  Location: [facility];  Service: Orthopedics    PB REPAIR BICEPS LONG TENDON Left 12/11/2023    Procedure: TENODESIS, BICEPS, OPEN;  Surgeon: Aris Pierre M.D.;  Location: [facility];  Service: Orthopedics    CATARACT EXTRACTION WITH IOL Right 2023    TOE AMPUTATION Left 09/30/2021    Procedure: AMPUTATION, TOE;  Surgeon: Tomer Hart M.D.;  Location: [facility];  Service: Orthopedics    STENT PLACEMENT  2019    STEMI with nonobstructive LAD    TOE AMPUTATION Right 07/02/2018    Procedure: Transmetatarsal amputation;  Surgeon: Ravi Bernardo M.D.;  Location: [facility];  Service: Orthopedics    ACHILLES TENDON REPAIR Right 07/02/2018    Procedure: ACHILLES LENGTHENING;  Surgeon: Ravi Bernardo M.D.;  Location: [facility];  Service: Orthopedics    IRRIGATION & DEBRIDEMENT ORTHO Right 07/02/2018    Procedure: IRRIGATION & DEBRIDEMENT ORTHO;  Surgeon: Ravi Bernardo M.D.;  Location: [facility];  Service: Orthopedics    OTHER Left     IOL    OTHER ABDOMINAL SURGERY      OTHER ORTHOPEDIC SURGERY      TONSILLECTOMY      VASECTOMY         History Leading to Admission, Conditions that Caused the Need for Rehab (CMS):       Co-morbidities:  See PMH  Potential Risk - Complications: Contractures, Deep Vein Thrombosis, Pain, Pneumonia, Pressure Ulcer, and Urinary Tract Infection  Level of Risk: High    Ongoing Medical Management Needed (Medical/Nursing Needs):   Patient Active Problem List     Diagnosis Date Noted    Anemia 02/04/2024    Hypocalcemia 01/30/2024    Septic shock, diabetic foot infection, CKD on dialysis ([facility]) 01/25/2024    Soft tissue infection of foot 01/25/2024    Unspecified fracture of left toe(s), initial encounter for open fracture 01/25/2024    Type 2 diabetes mellitus with proliferative retinopathy without macular edema, without long-term current use of insulin, unspecified laterality ([facility]) 01/18/2024    Hypertensive heart and chronic kidney disease with heart failure and stage 1 through stage 4 chronic kidney disease, or unspecified chronic kidney disease ([facility]) 01/18/2024    Hospital discharge follow-up 01/18/2024    Closed 3-part fracture of proximal humerus 12/06/2023    Hyperparathyroidism due to renal insufficiency ([facility]) 01/10/2023    Hypoglycemia associated with diabetes ([facility]) 11/28/2022    Osteomyelitis and soft tissue infection of left foot ([facility]) 09/30/2021    Presence of stent in coronary artery 09/30/2021    History of non-ST elevation myocardial infarction (NSTEMI) 09/30/2021    History of COVID-19 09/30/2021    Class 1 obesity due to excess calories with body mass index (BMI) of 34.0 to 34.9 in adult     Hypoglycemia 08/19/2021    Type 2 diabetes mellitus with kidney complication, with long-term current use of insulin ([facility]) 07/16/2021    Retinopathy 01/14/2020    Conductive hearing loss, bilateral 01/14/2020    Coronary artery disease involving native coronary artery of native heart without angina pectoris 10/02/2019    Functional diarrhea 09/27/2019    Hypertension 09/21/2019    NSTEMI (non-ST elevated myocardial infarction) ([facility]) 09/21/2019    Acute renal failure superimposed on stage 3b chronic kidney disease ([facility]) 09/21/2019    S/P transmetatarsal amputation of foot, right ([facility]) 07/05/2018     A/o  Current Vital Signs:   Temperature: 36.7 °C (98 °F) Pulse: 70 Respiration: 16 Blood Pressure: (!) 144/76  Weight: 115 kg (253 lb 4.9 oz) (Bedscale ) Height: 182.9 cm (6')   Pulse Oximetry: 94 % O2 (LPM): 0      Completed Laboratory Reports:  Recent Labs     02/04/24  1510 02/05/24  0330 02/06/24  0230 02/06/24  0316 02/07/24  0209   WBC 8.3 8.0 9.2 7.1 7.3   HEMOGLOBIN 7.4* 7.9* 5.0* 7.3* 9.2*   HEMATOCRIT 23.5* 24.3* 15.6* 22.0* 27.4*   PLATELETCT 119* 128* 156* 132* 133*   SODIUM  --  128* 132*  --  132*   POTASSIUM  --  4.2 4.2  --  4.3   BUN  --  32* 24*  --  27*   CREATININE  --  3.32* 2.87*  --  3.13*   ALBUMIN  --   --   --   --  1.5*   GLUCOSE  --  191* 222*  --  211*     Additional Labs: Not Applicable    Prior Living Situation:   Housing / Facility: [address]  Steps Into Home: 1 (pt plans to have Dad bump him up in , family has done this before with other family members and feel comfortable with this.)  Steps In Home: 0  Lives with - Patient's Self Care Capacity: Parents (home as needed, can assist as needed)  Equipment Owned: Front-Wheel Walker    Prior Level of Function / Living Situation:   Physical Therapy: Prior Services: None  Housing / Facility: [address] [facility]  Steps Into Home: 1 (pt plans to have Dad bump him up in wc, family has done this before with other family members and feel comfortable with this.)  Steps In Home: 0  Bathroom Set up: Walk In Shower, Bathtub / Shower Combination  Equipment Owned: Front-Wheel Walker  Lives with - Patient's Self Care Capacity: Parents (home as needed, can assist as needed)  Bed Mobility: Independent  Transfer Status: Independent  Ambulation: Independent  Assistive Devices Used: None  Stairs: Independent  Current Level of Function:   Gait Level Of Assist: Unable to Participate  Supine to Sit: Standby Assist  Sit to Supine: Standby Assist  Scooting: Minimal Assist  Rolling:  (pivoted using rail to come to sit with HOB up.)  Skilled Intervention: Verbal Cuing, Compensatory Strategies  Comments: use of bed rails  Sit to Stand: Unable to Participate  Bed, Chair, Wheelchair Transfer: Moderate Assist  Transfer Method: Sit  Pivot  Skilled Intervention: Verbal Cuing, Compensatory Strategies  Sitting Edge of Bed: 15+  Occupational Therapy:   Self Feeding: Independent  Grooming / Hygiene: Independent  Bathing: Independent  Dressing: Independent  Toileting: Independent  Prior Level Of Mobility: Independent Without Device in Community, Independent Without Device in Home  Prior Services: None  Housing / Facility: [address]  Current Level of Function:   Lower Body Dressing: Moderate Assist (mom assisting with threading BLE through underwear, pt able to bridge in bed and pull them up on his own)  Toileting:  (declined need)  Speech Language Pathology:      Rehabilitation Prognosis/Potential: Good  Estimated Length of Stay: 10-14 days    Nursing:      Continent    Scope/Intensity of Services Recommended:  Physical Therapy: 1.5 hr / day  5 days / week. Therapeutic Interventions Required: Maximize Endurance, Mobility, Strength, and Safety  Occupational Therapy: 1.5 hr / day 5 days / week. Therapeutic Interventions Required: Maximize Self Care, ADLs, IADLs, and Energy Conservation  Rehabilitation Nursin/. Therapeutic Interventions Required: Monitor Pain, Skin, Vital Signs, Intake and Output, Labs, Safety, and Family Training  Rehabilitation Physician: 3 - 5 days / week. Therapeutic Interventions Required: Medical Management    He requires 24-hour rehabilitation nursing to manage bowel and bladder function, skin care, surgical incision, nutrition and fluid intake, pain control, safety, medication management, and patient/family goals. In addition, rehabilitation nursing will reiterate and reinforce therapy skills and equipment use, including ADLs, as well as provide education to the patient and family. Beni Moore is willing to participate in and is able to tolerate the proposed plan of care.    Rehabilitation Goals and Plan (Expected frequency & duration of treatment in the IRF):   Return to the Community, Modified Independent Level  of Care, Minimal Assist Level of Care, and Family Able to Provide 24/7 Assistance  Anticipated Date of Rehabilitation Admission: 2/7/24  Patient/Family oriented IRF level of care/facility/plan: Yes  Patient/Family willing to participate in IRF care/facility/plan: Yes  Patient able to tolerate IRF level of care proposed: Yes  Patient has potential to benefit IRF level of care proposed: Yes  Comments: Not Applicable    Special Needs or Precautions - Medical Necessity:  Safety Concerns/Precautions:  Fall Risk / High Risk for Falls and Balance  Pain Management  IV Site: Peripheral  Current Medications:    Current Facility-Administered Medications Ordered in Epic   Medication Dose Route Frequency Provider Last Rate Last Admin    insulin GLARGINE (Lantus,Semglee) injection  15 Units Subcutaneous Q EVENING Marcelo Sanders M.D.   15 Units at 02/06/24 1724    calcium carbonate (Os-Quitnon 500) tablet 500 mg  500 mg Oral BID WITH MEALS Marcelo Sanders M.D.   500 mg at 02/07/24 0723    lidocaine (Xylocaine) 1 % injection 1 mL  1 mL Intradermal PRN Rosmery Leon M.D.        Pharmacy Consult Request ...Pain Management Review 1 Each  1 Each Other PHARMACY TO DOSE Larry Rodriguez M.D.        acetaminophen (Tylenol) tablet 1,000 mg  1,000 mg Oral Q6HRS PRN Larry Rodriguez M.D.        oxyCODONE immediate-release (Roxicodone) tablet 5 mg  5 mg Oral Q3HRS PRN Larry Rodriguez M.D.   5 mg at 02/06/24 2219    Or    oxyCODONE immediate release (Roxicodone) tablet 10 mg  10 mg Oral Q3HRS PRN Larry Rodriguez M.D.   10 mg at 02/06/24 0158    Or    HYDROmorphone (Dilaudid) injection 0.5 mg  0.5 mg Intravenous Q3HRS PRN Larry Rodriguez M.D.   0.5 mg at 02/02/24 0312    heparin injection 5,000 Units  5,000 Units Subcutaneous Q8HRS Sam Givens M.D.   5,000 Units at 02/07/24 0612    heparin injection 3,600 Units  3,600 Units Intracatheter DIALYSIS PRN Rosmery Leon M.D.   3,600 Units at 02/05/24 1132    insulin regular (HumuLIN R,NovoLIN R)  injection  3-14 Units Subcutaneous 4X/DAY ACHS Alejandro Mcbride M.D.   4 Units at 02/07/24 0730    And    dextrose 50% (D50W) injection 25 g  25 g Intravenous Q15 MIN PRN Alejandro Mcbride M.D.        NS (Bolus) 0.9 % infusion 250 mL  250 mL Intravenous DIALYSIS PRN Varun Ortiz M.D.        epoetin (Retacrit) 5,000 Units injection (Dialysis Use Only)  5,000 Units Intravenous MO, WE + FR Varun Ortiz M.D.   5,000 Units at 02/05/24 1115    LORazepam (Ativan) injection 1 mg  1 mg Intravenous Q6HRS PRN Alejandro Mcbride M.D.   1 mg at 01/27/24 1556    atorvastatin (Lipitor) tablet 80 mg  80 mg Oral Q EVENING Tino Romano Jr., D.O.   80 mg at 02/06/24 2209    omeprazole (PriLOSEC) capsule 20 mg  20 mg Oral QAM Tino Romano Jr., D.O.   20 mg at 02/07/24 0612    senna-docusate (Pericolace Or Senokot S) 8.6-50 MG per tablet 2 Tablet  2 Tablet Oral BID Tino Romano Jr., D.O.   2 Tablet at 01/30/24 0435    And    polyethylene glycol/lytes (Miralax) Packet 1 Packet  1 Packet Oral QDAY PRN Tino Romano Jr., D.OAsiya        And    bisacodyl (Dulcolax) suppository 10 mg  10 mg Rectal QDAY PRN Tino Romano Jr., D.O.        ondansetron (Zofran) syringe/vial injection 4 mg  4 mg Intravenous Q4HRS PRN Tino Romano Jr. D.O.        ondansetron (Zofran ODT) dispertab 4 mg  4 mg Oral Q4HRS PRN JD Doss Jr..O.        promethazine (Phenergan) tablet 12.5-25 mg  12.5-25 mg Oral Q4HRS PRN Tino Romano Jr. D.O.        promethazine (Phenergan) suppository 12.5-25 mg  12.5-25 mg Rectal Q4HRS PRN Tino Romano Jr., D.O.        prochlorperazine (Compazine) injection 5-10 mg  5-10 mg Intravenous Q4HRS PRN Tino Romano Jr., D.O.        LR (Bolus) infusion 500 mL  500 mL Intravenous Once PRN Tino Romano Jr., D.O.        aspirin EC tablet 81 mg  81 mg Oral DAILY Tino Romano Jr., D.O.   81 mg at 02/07/24 0612    cholestyramine (Questran) 4 GM powder 4 g  1 Packet Oral BID JD Doss Jr..TYLER   4 g at 02/07/24  0615     Current Outpatient Medications Ordered in Epic   Medication Sig Dispense Refill    calcium carbonate (OS-ALEXANDREA 500) 500 MG Tab Take 1 Tablet by mouth 2 times a day with meals for 30 days. 60 Tablet 0    carvedilol (COREG) 3.125 MG Tab TAKE 1 TABLET TWICE A DAY WITH MEALS 180 Tablet 3     Diet:   DIET ORDERS (From admission to next 24h)       Start     Ordered    02/01/24 1620  Diet Order Diet: Consistent CHO (Diabetic); Second Modifier: (optional): Renal  ALL MEALS        Question Answer Comment   Diet: Consistent CHO (Diabetic)    Second Modifier: (optional) Renal        02/01/24 1619                    Anticipated Discharge Destination / Patient/Family Goal:  Destination: Home with Assistance Support System: Family   Anticipated home health services: OT, PT, Nursing, and Social Work  Previously used HH service/ provider: Not Applicable  Anticipated DME Needs: Walker and Wheelchair  Outpatient Services: OT and PT  Alternative resources to address additional identified needs:   Future Appointments   Date Time Provider Department Center   [facility]20[facility]2024  [facility]      Pre-Screen Completed: 2/7/2024 9:15 AM Sonam Sen

## 2024-02-07 NOTE — PROGRESS NOTES
Orthopedic PA Progress Note    Interval changes:  Patient doing well. Dressing change education provided to caretaker.   LLE dressings are CDI with prosthetic knee immobilizer in place  NWB LLE  Sutures to remain in place 4-6 weeks postop  Follow up with the [facility] trauma [facility] 4 weeks postop.  Cleared for DC from orthopedic standpoint    ROS - Patient denies any new issues.  Denies any numbness or tingling. Pain well controlled.    BP (!) 144/76   Pulse 70   Temp 36.7 °C (98 °F) (Temporal)   Resp 16   Ht 1.829 m (6')   Wt 115 kg (253 lb 4.9 oz)   SpO2 94%     Patient seen and examined  No acute distress  Breathing non labored  RRR  LLE: Dressings CDI.     Recent Labs     02/06/24  0230 02/06/24  0316 02/07/24  0209   WBC 9.2 7.1 7.3   RBC 1.73* 2.53* 3.14*   HEMOGLOBIN 5.0* 7.3* 9.2*   HEMATOCRIT 15.6* 22.0* 27.4*   MCV 90.2 87.0 87.3   MCH 28.9 28.9 29.3   MCHC 32.1* 33.2 33.6   RDW 43.8 42.5 43.3   PLATELETCT 156* 132* 133*   MPV 9.8 9.7 10.1       Active Hospital Problems    Diagnosis     Anemia [D64.9]     Hypocalcemia [E83.51]     Septic shock, diabetic foot infection, CKD on dialysis ([facility]) [A41.9, R65.21]     Soft tissue infection of foot [L08.9]     Unspecified fracture of left toe(s), initial encounter for open fracture [S92.912B]     Osteomyelitis and soft tissue infection of left foot ([facility]) [M86.9]     Class 1 obesity due to excess calories with body mass index (BMI) of 34.0 to 34.9 in adult [E66.09, Z68.34]     Type 2 diabetes mellitus with kidney complication, with long-term current use of insulin (HCC) [E11.29, Z79.4]     Acute renal failure superimposed on stage 3b chronic kidney disease (HCC) [N17.9, N18.32]        Assessment/Plan:  Patient doing well. Dressing change education provided to caretaker.   LLE dressings are CDI with prosthetic knee immobilizer in place  NWB LLE  Follow up with the [facility] trauma [facility] clinic 4 weeks postop.  Cleared for DC from orthopedic standpoint    POD#6  S/p  Left below knee amputation   Wt bearing status - NWB LLE  Wound care/Drains - Dressings to be changed every other day by nursing. Or PRN for saturation starting POD#2  Future Procedures - None planned   Lovenox: On ASA daily  Sutures/Staples out- 4-6 weeks post operatively. Removal will completed by ortho [facility]'s unless transferred.  DVT Prophylaxis outpatient: ASA 81 mg PO BID x4 weeks  PT/OT-initiated  Antibiotics:  Perioperative completed  DVT Prophylaxis- TEDS/SCDs/Foot pumps  Farrell-not needed per ortho  Case Coordination for Discharge Planning - Disposition per therapy recs.

## 2024-02-07 NOTE — DISCHARGE PLANNING
Case Management Discharge Planning    Admission Date: 1/25/2024  GMLOS: 9.9  ALOS: 13    6-Clicks ADL Score: 15  6-Clicks Mobility Score: 9  PT and/or OT Eval ordered: Yes  Post-acute Referrals Ordered: Yes  Post-acute Choice Obtained: No  Has referral(s) been sent to post-acute provider:  No    Anticipated Discharge Dispo: Discharge Disposition: Disch to IP rehab facility or distinct part unit (62)    DME Needed: Yes    DME Ordered: Yes  Wheelchair delivered by [employer] [facility] to [place] address.     Action(s) Taken:   Per MD, pt is medically clear. IPR following.     CM RN sent communication to transition care coordinator for possible bed availability today. Pending confirmation.     1020  Per [facility], no beds available today for IPR, possible bed availability tomorrow.     1128  CM RN met with pt at bedside to discuss discharge plan. Pt is agreeable with [facility] when bed is available.     Choice form completed by pt for [facility] as first choice and form faxed to [facility] for processing.     COBRA packet left on CM RN desk.     153  Patient accepted by Dr. Ny [facility].   Transportation set for 2/8/24 at 5502-0559 via GMT.   MD and bedside RN informed.     Escalations Completed: None    Medically Clear: Yes    Next Steps: CM RN to continue assisting with discharge planning as needed.       Barriers to Discharge: Pending Placement and Transportation    Is the patient up for discharge tomorrow: No

## 2024-02-07 NOTE — PROGRESS NOTES
Received report and assumed care of patient at change of shift. Patient is A&Ox 4, on room air, and reports 1/10 pain  but declines medication at this time. Patient assessment completed, bed in lowest position, and call light and personal belongings are within reach. Patient expressed no further needs at this time.

## 2024-02-07 NOTE — PROGRESS NOTES
Hospital Medicine Daily Progress Note    Date of Service  2/6/2024    Chief Complaint  Beni Moore is a 53 y.o. male admitted 1/25/2024 with toe infection    Hospital Course     53 y.o. 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  X-ray revealed pathologic fracture and was consistent with osteomyelitis with gas in the soft tissues.  WBC count was 24.7 thousand.  His creatinine was quite elevated at 6.36.  He was hypotensive.  He was placed on IV Zosyn and Zyvox, along with IV fluids.  Orthopedics and nephrology were consulted.  Septic shock has improved, and patient subsequently underwent left fifth ray amputation through metatarsal shaft with irrigation and debridement of left plantar foot abscess. Due to ongoing elevated creatinine, a tunneled dialysis catheter was placed and patient started on hemodialysis on 1/31/2024.  Surgical cultures remain negative.  Blood cultures remain negative.  Patient underwent through left BKA on 2/1/2024 by Dr. Segura.  Patient had session of hemodialysis today on 2/5.  Per physical therapy evaluation on 2/25 he is not safe to go home and recommending SNF.  Consulted PMR Dr. Quintero will evaluate for further recommendation      Interval Problem Update  I reviewed the chart along with vitals, labs, imaging, test (both pending and resulted) and recommendations from specialists and interdisciplinary team.  He is stable. No new issues or complaints. He is resting.   Managing sepsis from infected foot now s/p L BKA and completed course of antibiotics  He has chronic kidney disease and has now gone on to dialysis. K 4.2, bicarb 25 Cr- 2.87    PT/OT recommends  "skilled. Rehab evaluating and has accepted. Patient pending acceptance, questions about affording it \"financial plan\". Will need outpatient dialysis chair as well.      I have discussed this patient's plan of care and discharge plan at IDT rounds today with Case Management, Nursing, Nursing leadership, and other members of the IDT team.    Consultants/Specialty  nephrology and orthopedics    Code Status  Full Code    Disposition  Medically Cleared  I have placed the appropriate orders for post-discharge needs.    Review of Systems  Review of Systems   Musculoskeletal:         Left BKA site pain        Physical Exam  Temp:  [36 °C (96.8 °F)-36.6 °C (97.9 °F)] 36.3 °C (97.4 °F)  Pulse:  [70-81] 72  Resp:  [15-19] 16  BP: (119-155)/(65-82) 119/65  SpO2:  [94 %-98 %] 95 %    Physical Exam  Constitutional:       Appearance: Normal appearance. He is obese.   HENT:      Mouth/Throat:      Mouth: Mucous membranes are dry.   Cardiovascular:      Rate and Rhythm: Normal rate and regular rhythm.   Pulmonary:      Effort: Pulmonary effort is normal.      Breath sounds: Normal breath sounds.   Abdominal:      General: There is no distension.   Musculoskeletal:      Comments: Left BKA, wrapped with clean dressing   Neurological:      General: No focal deficit present.      Mental Status: He is alert and oriented to person, place, and time.   Psychiatric:         Mood and Affect: Mood normal.         Fluids    Intake/Output Summary (Last 24 hours) at 2/6/2024 1702  Last data filed at 2/6/2024 1000  Gross per 24 hour   Intake 599.17 ml   Output 900 ml   Net -300.83 ml         Laboratory  Recent Labs     02/05/24  0330 02/06/24  0230 02/06/24  0316   WBC 8.0 9.2 7.1   RBC 2.78* 1.73* 2.53*   HEMOGLOBIN 7.9* 5.0* 7.3*   HEMATOCRIT 24.3* 15.6* 22.0*   MCV 87.4 90.2 87.0   MCH 28.4 28.9 28.9   MCHC 32.5 32.1* 33.2   RDW 43.5 43.8 42.5   PLATELETCT 128* 156* 132*   MPV 9.9 9.8 9.7       Recent Labs     02/04/24  0309 02/05/24  0330 " 02/06/24  0230   SODIUM 130* 128* 132*   POTASSIUM 4.1 4.2 4.2   CHLORIDE 99 98 100   CO2 23 23 25   GLUCOSE 246* 191* 222*   BUN 26* 32* 24*   CREATININE 3.05* 3.32* 2.87*   CALCIUM 6.5* 6.6* 6.5*                     Imaging  MR-FOOT-W/O LEFT   Final Result      1.  Marrow edema involving the fifth metatarsal head with also seen marrow edema and fragmentation of the fifth proximal phalanx and marrow edema involving the middle and distal phalanges. This most likely represents osteomyelitis with pathologic    fracture. There is surrounding cellulitis.      2.  Mild marrow edema involving the fourth metatarsal head and the fourth proximal phalanx possibly reactive in nature versus osteomyelitis.      3.  Edema/induration of the soft tissues of the forefoot, midfoot and hindfoot with also seen involvement of the intrinsic muscles of the foot likely representing cellulitis and myositis. There are punctate areas of gas within the soft tissues of the    forefoot and midfoot which may indicate areas of tissue necrosis.      4.  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      IR-BRADLEY SABA PLACEMENT >5   Final Result      1. ULTRASOUND AND FLUOROSCOPIC GUIDED PLACEMENT OF A Right INTERNAL JUGULAR 14.5 [place] GLIDE PATH TUNNELED DIALYSIS CATHETER.      2. THE HEMODIALYSIS CATHETER MAY BE USED IMMEDIATELY AS CLINICALLY INDICATED. FLUSHES PER PROTOCOL.         US-FLOWER SINGLE LEVEL BILAT   Final Result      DX-CHEST-PORTABLE (1 VIEW)   Final Result      CT-FOOT W/O PLUS RECONS LEFT   Final Result      1.  Findings are consistent with osteomyelitis and pathologic fracture involving the proximal phalanx of the fifth digit.      2.  Soft tissue swelling and emphysema is present around the fifth digit and in the plantar soft tissues of the foot. These findings are consistent with cellulitis.      DX-FOOT-COMPLETE 3+ LEFT   Final Result      1.  Comminuted fracture proximal phalanx fifth digit is identified which  "could be pathologic and related to osteomyelitis.      2.  Soft tissue swelling and emphysema in the fifth digit which could be due to cellulitis.      DX-CHEST-PORTABLE (1 VIEW)   Final Result         No acute cardiac or pulmonary abnormality is identified.           Assessment/Plan  * Septic shock, diabetic foot infection, CKD on dialysis (HCC)- (present on admission)  Assessment & Plan  Resolved  Managing sepsis from infected foot now s/p L BKA copoleted course of antibiotics  He has chronic kidney disease and has now gone on to dialysis. K 4.2, bicarb 25 Cr- 2.87    PT/OT recommends skilled. Rehab evaluating and has accepted. Patient pending acceptance, questions about affording it \"financial plan\". Will need outpatient dialysis chair as well.        Anemia  Assessment & Plan  Transfuse 1 unit PRBC to keep hemoglobin above 7  Need close monitoring for low H&H.  Currently no sign of active sign of bleeding.  Anemia likely from CKD.  Repeat iron panel    2/5/2024  No concern for underlying iron deficiency anemia  H&H remained stable now stable  Recent Labs     02/05/24  0330 02/06/24  0230 02/06/24  0316   HEMOGLOBIN 7.9* 5.0* 7.3*   HEMATOCRIT 24.3* 15.6* 22.0*   MCV 87.4 90.2 87.0   MCH 28.4 28.9 28.9   PLATELETCT 128* 156* 132*     Reviewed iron panel      Hypocalcemia- (present on admission)  Assessment & Plan  Ionized calcium is around 1  Asymptomatic for hypocalcemia  Check vitamin D level  On calcium oxalate    Soft tissue infection of foot- (present on admission)  Assessment & Plan  As above    Osteomyelitis and soft tissue infection of left foot (HCC)- (present on admission)  Assessment & Plan  Noted on CT scan and MRI.  Has extensive soft tissue infection with osteomyelitis.  Orthopedics on board, s/p left fifth ray amputation through metatarsal shaft with irrigation and debridement of left plantar foot abscess.  Orthopedics planning for BKA today 2/1/2024.  Completed antibiotics.  Post BKA, will have " achieved adequate source control and has no further antibiotic needs.  Continue pain control.    Class 1 obesity due to excess calories with body mass index (BMI) of 34.0 to 34.9 in adult  Assessment & Plan  Recommended weight loss advised, 5% through reduced calorie, low carb diet and 150 mins of exercise a week once better  Recommend bariatric surgery evaluation if morbidly obese  Educated on the increase of morbidity and mortality associated with excess weight including DM, Heart Disease, HTN, stroke, and sleep apnea. Recommended outpatient monitoring  of blood sugars, lipid panel, sleep study evaluation for metabolic syndrome.      Type 2 diabetes mellitus with kidney complication, with long-term current use of insulin ([facility])- (present on admission)  Assessment & Plan  HbA1c 7.2.  Continue sliding scale insulin coverage while in house.  Holding long-acting insulin given now that he is hemodialysis dependent and at risk for insulin stacking/accumulation.  Accu-Cheks before meals and at bedtime. Goal to keep BG between 140-180 per 2019 ADA guidelines.  Continue diabetic diet.      Acute renal failure superimposed on stage 3b chronic kidney disease (HCC)- (present on admission)  Assessment & Plan  Patient now started on hemodialysis 1/29/2024.  Nephrology following.  Outpatient hemodialysis chair arranged.           VTE prophylaxis: Heparin subcu    I have performed a physical exam and reviewed and updated ROS and Plan today (2/6/2024). In review of yesterday's note (2/5/2024), there are no changes except as documented above.

## 2024-02-07 NOTE — PROCEDURES
Pt with GI on CKD stage IIIb, started dialysis.  Pt is doing better.  Seen and examined while getting HD.

## 2024-02-08 ENCOUNTER — HOSPITAL ENCOUNTER (INPATIENT)
Facility: REHABILITATION | Age: 54
LOS: 14 days | DRG: 559 | End: 2024-02-22
Attending: PHYSICAL MEDICINE & REHABILITATION | Admitting: PHYSICAL MEDICINE & REHABILITATION
Payer: MEDICARE

## 2024-02-08 VITALS
WEIGHT: 253.31 LBS | RESPIRATION RATE: 18 BRPM | BODY MASS INDEX: 34.31 KG/M2 | HEART RATE: 78 BPM | SYSTOLIC BLOOD PRESSURE: 155 MMHG | HEIGHT: 72 IN | DIASTOLIC BLOOD PRESSURE: 90 MMHG | TEMPERATURE: 97.9 F | OXYGEN SATURATION: 97 %

## 2024-02-08 DIAGNOSIS — E78.5 HYPERLIPIDEMIA, UNSPECIFIED HYPERLIPIDEMIA TYPE: ICD-10-CM

## 2024-02-08 DIAGNOSIS — Z89.512 HX OF BKA, LEFT (HCC): ICD-10-CM

## 2024-02-08 DIAGNOSIS — I25.10 CORONARY ARTERY DISEASE INVOLVING NATIVE CORONARY ARTERY OF NATIVE HEART WITHOUT ANGINA PECTORIS: ICD-10-CM

## 2024-02-08 DIAGNOSIS — G47.9 DIFFICULTY SLEEPING: ICD-10-CM

## 2024-02-08 DIAGNOSIS — E11.3599 TYPE 2 DIABETES MELLITUS WITH PROLIFERATIVE RETINOPATHY WITHOUT MACULAR EDEMA, WITHOUT LONG-TERM CURRENT USE OF INSULIN, UNSPECIFIED LATERALITY (HCC): ICD-10-CM

## 2024-02-08 DIAGNOSIS — I13.0 HYPERTENSIVE HEART AND CHRONIC KIDNEY DISEASE WITH HEART FAILURE AND STAGE 1 THROUGH STAGE 4 CHRONIC KIDNEY DISEASE, OR UNSPECIFIED CHRONIC KIDNEY DISEASE (HCC): ICD-10-CM

## 2024-02-08 LAB
ALBUMIN SERPL BCP-MCNC: 1.6 G/DL (ref 3.2–4.9)
ANION GAP SERPL CALC-SCNC: 5 MMOL/L (ref 7–16)
BUN SERPL-MCNC: 23 MG/DL (ref 8–22)
CALCIUM ALBUM COR SERPL-MCNC: 9.1 MG/DL (ref 8.5–10.5)
CALCIUM SERPL-MCNC: 7.2 MG/DL (ref 8.5–10.5)
CHLORIDE SERPL-SCNC: 101 MMOL/L (ref 96–112)
CO2 SERPL-SCNC: 25 MMOL/L (ref 20–33)
CREAT SERPL-MCNC: 2.54 MG/DL (ref 0.5–1.4)
ERYTHROCYTE [DISTWIDTH] IN BLOOD BY AUTOMATED COUNT: 43.4 FL (ref 35.9–50)
GFR SERPLBLD CREATININE-BSD FMLA CKD-EPI: 29 ML/MIN/1.73 M 2
GLUCOSE BLD STRIP.AUTO-MCNC: 154 MG/DL (ref 65–99)
GLUCOSE BLD STRIP.AUTO-MCNC: 222 MG/DL (ref 65–99)
GLUCOSE BLD STRIP.AUTO-MCNC: 227 MG/DL (ref 65–99)
GLUCOSE SERPL-MCNC: 134 MG/DL (ref 65–99)
HCT VFR BLD AUTO: 25.1 % (ref 42–52)
HGB BLD-MCNC: 8.3 G/DL (ref 14–18)
MCH RBC QN AUTO: 29 PG (ref 27–33)
MCHC RBC AUTO-ENTMCNC: 33.1 G/DL (ref 32.3–36.5)
MCV RBC AUTO: 87.8 FL (ref 81.4–97.8)
PHOSPHATE SERPL-MCNC: 2.2 MG/DL (ref 2.5–4.5)
PLATELET # BLD AUTO: 151 K/UL (ref 164–446)
PMV BLD AUTO: 9.7 FL (ref 9–12.9)
POTASSIUM SERPL-SCNC: 3.9 MMOL/L (ref 3.6–5.5)
RBC # BLD AUTO: 2.86 M/UL (ref 4.7–6.1)
SODIUM SERPL-SCNC: 131 MMOL/L (ref 135–145)
WBC # BLD AUTO: 7.2 K/UL (ref 4.8–10.8)

## 2024-02-08 PROCEDURE — A9270 NON-COVERED ITEM OR SERVICE: HCPCS | Performed by: STUDENT IN AN ORGANIZED HEALTH CARE EDUCATION/TRAINING PROGRAM

## 2024-02-08 PROCEDURE — 97530 THERAPEUTIC ACTIVITIES: CPT

## 2024-02-08 PROCEDURE — 99232 SBSQ HOSP IP/OBS MODERATE 35: CPT | Performed by: INTERNAL MEDICINE

## 2024-02-08 PROCEDURE — 700102 HCHG RX REV CODE 250 W/ 637 OVERRIDE(OP): Performed by: PHYSICAL MEDICINE & REHABILITATION

## 2024-02-08 PROCEDURE — 700102 HCHG RX REV CODE 250 W/ 637 OVERRIDE(OP): Performed by: STUDENT IN AN ORGANIZED HEALTH CARE EDUCATION/TRAINING PROGRAM

## 2024-02-08 PROCEDURE — 82962 GLUCOSE BLOOD TEST: CPT | Mod: 91

## 2024-02-08 PROCEDURE — 99223 1ST HOSP IP/OBS HIGH 75: CPT | Performed by: PHYSICAL MEDICINE & REHABILITATION

## 2024-02-08 PROCEDURE — 97110 THERAPEUTIC EXERCISES: CPT

## 2024-02-08 PROCEDURE — 80069 RENAL FUNCTION PANEL: CPT

## 2024-02-08 PROCEDURE — 85027 COMPLETE CBC AUTOMATED: CPT

## 2024-02-08 PROCEDURE — 700102 HCHG RX REV CODE 250 W/ 637 OVERRIDE(OP): Performed by: INTERNAL MEDICINE

## 2024-02-08 PROCEDURE — 94760 N-INVAS EAR/PLS OXIMETRY 1: CPT

## 2024-02-08 PROCEDURE — A9270 NON-COVERED ITEM OR SERVICE: HCPCS | Performed by: PHYSICAL MEDICINE & REHABILITATION

## 2024-02-08 PROCEDURE — 82962 GLUCOSE BLOOD TEST: CPT

## 2024-02-08 PROCEDURE — A9270 NON-COVERED ITEM OR SERVICE: HCPCS | Performed by: INTERNAL MEDICINE

## 2024-02-08 PROCEDURE — 770010 HCHG ROOM/CARE - REHAB SEMI PRIVAT*

## 2024-02-08 PROCEDURE — 700111 HCHG RX REV CODE 636 W/ 250 OVERRIDE (IP): Performed by: PHYSICAL MEDICINE & REHABILITATION

## 2024-02-08 PROCEDURE — 700111 HCHG RX REV CODE 636 W/ 250 OVERRIDE (IP): Mod: JZ | Performed by: STUDENT IN AN ORGANIZED HEALTH CARE EDUCATION/TRAINING PROGRAM

## 2024-02-08 RX ORDER — DEXTROSE MONOHYDRATE 25 G/50ML
25 INJECTION, SOLUTION INTRAVENOUS
Status: CANCELLED | OUTPATIENT
Start: 2024-02-08

## 2024-02-08 RX ORDER — ASPIRIN 81 MG/1
81 TABLET ORAL DAILY
Status: CANCELLED | OUTPATIENT
Start: 2024-02-09

## 2024-02-08 RX ORDER — ATORVASTATIN CALCIUM 40 MG/1
80 TABLET, FILM COATED ORAL EVERY EVENING
Status: CANCELLED | OUTPATIENT
Start: 2024-02-08

## 2024-02-08 RX ORDER — AMOXICILLIN 250 MG
2 CAPSULE ORAL 2 TIMES DAILY
Status: DISCONTINUED | OUTPATIENT
Start: 2024-02-08 | End: 2024-02-09

## 2024-02-08 RX ORDER — HEPARIN SODIUM 5000 [USP'U]/ML
5000 INJECTION, SOLUTION INTRAVENOUS; SUBCUTANEOUS EVERY 8 HOURS
Status: DISCONTINUED | OUTPATIENT
Start: 2024-02-08 | End: 2024-02-22 | Stop reason: HOSPADM

## 2024-02-08 RX ORDER — OXYCODONE HYDROCHLORIDE 5 MG/1
5 TABLET ORAL EVERY 4 HOURS PRN
Status: CANCELLED | OUTPATIENT
Start: 2024-02-08

## 2024-02-08 RX ORDER — OXYCODONE HYDROCHLORIDE 10 MG/1
10 TABLET ORAL EVERY 4 HOURS PRN
Status: DISCONTINUED | OUTPATIENT
Start: 2024-02-08 | End: 2024-02-22 | Stop reason: HOSPADM

## 2024-02-08 RX ORDER — ONDANSETRON 4 MG/1
4 TABLET, ORALLY DISINTEGRATING ORAL 4 TIMES DAILY PRN
Status: DISCONTINUED | OUTPATIENT
Start: 2024-02-08 | End: 2024-02-22 | Stop reason: HOSPADM

## 2024-02-08 RX ORDER — CARBOXYMETHYLCELLULOSE SODIUM 5 MG/ML
1 SOLUTION/ DROPS OPHTHALMIC PRN
Status: DISCONTINUED | OUTPATIENT
Start: 2024-02-08 | End: 2024-02-22 | Stop reason: HOSPADM

## 2024-02-08 RX ORDER — CHOLESTYRAMINE LIGHT 4 G/5.7G
1 POWDER, FOR SUSPENSION ORAL 2 TIMES DAILY
Status: DISCONTINUED | OUTPATIENT
Start: 2024-02-08 | End: 2024-02-08

## 2024-02-08 RX ORDER — OMEPRAZOLE 20 MG/1
20 CAPSULE, DELAYED RELEASE ORAL EVERY MORNING
Status: DISCONTINUED | OUTPATIENT
Start: 2024-02-09 | End: 2024-02-22 | Stop reason: HOSPADM

## 2024-02-08 RX ORDER — ATORVASTATIN CALCIUM 40 MG/1
80 TABLET, FILM COATED ORAL EVERY EVENING
Status: DISCONTINUED | OUTPATIENT
Start: 2024-02-08 | End: 2024-02-22 | Stop reason: HOSPADM

## 2024-02-08 RX ORDER — ECHINACEA PURPUREA EXTRACT 125 MG
2 TABLET ORAL PRN
Status: DISCONTINUED | OUTPATIENT
Start: 2024-02-08 | End: 2024-02-22 | Stop reason: HOSPADM

## 2024-02-08 RX ORDER — ACETAMINOPHEN 500 MG
1000 TABLET ORAL EVERY 6 HOURS PRN
Status: CANCELLED | OUTPATIENT
Start: 2024-02-08

## 2024-02-08 RX ORDER — ACETAMINOPHEN 500 MG
1000 TABLET ORAL EVERY 6 HOURS PRN
Status: DISCONTINUED | OUTPATIENT
Start: 2024-02-08 | End: 2024-02-22 | Stop reason: HOSPADM

## 2024-02-08 RX ORDER — OMEPRAZOLE 20 MG/1
20 CAPSULE, DELAYED RELEASE ORAL EVERY MORNING
Status: CANCELLED | OUTPATIENT
Start: 2024-02-09

## 2024-02-08 RX ORDER — ALUMINA, MAGNESIA, AND SIMETHICONE 2400; 2400; 240 MG/30ML; MG/30ML; MG/30ML
20 SUSPENSION ORAL
Status: DISCONTINUED | OUTPATIENT
Start: 2024-02-08 | End: 2024-02-22 | Stop reason: HOSPADM

## 2024-02-08 RX ORDER — ASCORBIC ACID 500 MG
500 TABLET ORAL 2 TIMES DAILY WITH MEALS
Status: DISCONTINUED | OUTPATIENT
Start: 2024-02-08 | End: 2024-02-09

## 2024-02-08 RX ORDER — POLYETHYLENE GLYCOL 3350 17 G/17G
1 POWDER, FOR SOLUTION ORAL
Status: DISCONTINUED | OUTPATIENT
Start: 2024-02-08 | End: 2024-02-09

## 2024-02-08 RX ORDER — AMOXICILLIN 250 MG
2 CAPSULE ORAL 2 TIMES DAILY
Status: CANCELLED | OUTPATIENT
Start: 2024-02-08

## 2024-02-08 RX ORDER — HYDRALAZINE HYDROCHLORIDE 25 MG/1
25 TABLET, FILM COATED ORAL EVERY 8 HOURS PRN
Status: DISCONTINUED | OUTPATIENT
Start: 2024-02-08 | End: 2024-02-22 | Stop reason: HOSPADM

## 2024-02-08 RX ORDER — BISACODYL 10 MG
10 SUPPOSITORY, RECTAL RECTAL
Status: DISCONTINUED | OUTPATIENT
Start: 2024-02-08 | End: 2024-02-09

## 2024-02-08 RX ORDER — HEPARIN SODIUM 1000 [USP'U]/ML
3600 INJECTION, SOLUTION INTRAVENOUS; SUBCUTANEOUS
Status: CANCELLED | OUTPATIENT
Start: 2024-02-08

## 2024-02-08 RX ORDER — OXYCODONE HYDROCHLORIDE 5 MG/1
5 TABLET ORAL EVERY 4 HOURS PRN
Status: DISCONTINUED | OUTPATIENT
Start: 2024-02-08 | End: 2024-02-22 | Stop reason: HOSPADM

## 2024-02-08 RX ORDER — CALCIUM CARBONATE 500(1250)
500 TABLET ORAL 2 TIMES DAILY WITH MEALS
Status: DISCONTINUED | OUTPATIENT
Start: 2024-02-08 | End: 2024-02-20

## 2024-02-08 RX ORDER — CALCIUM CARBONATE 500(1250)
500 TABLET ORAL 2 TIMES DAILY WITH MEALS
Status: CANCELLED | OUTPATIENT
Start: 2024-02-08

## 2024-02-08 RX ORDER — ONDANSETRON 2 MG/ML
4 INJECTION INTRAMUSCULAR; INTRAVENOUS 4 TIMES DAILY PRN
Status: DISCONTINUED | OUTPATIENT
Start: 2024-02-08 | End: 2024-02-22 | Stop reason: HOSPADM

## 2024-02-08 RX ORDER — TRAZODONE HYDROCHLORIDE 50 MG/1
50 TABLET ORAL
Status: DISCONTINUED | OUTPATIENT
Start: 2024-02-08 | End: 2024-02-22 | Stop reason: HOSPADM

## 2024-02-08 RX ORDER — HEPARIN SODIUM 1000 [USP'U]/ML
3600 INJECTION, SOLUTION INTRAVENOUS; SUBCUTANEOUS
Status: DISCONTINUED | OUTPATIENT
Start: 2024-02-08 | End: 2024-02-22 | Stop reason: HOSPADM

## 2024-02-08 RX ORDER — M-VIT,TX,IRON,MINS/CALC/FOLIC 27MG-0.4MG
1 TABLET ORAL
Status: DISCONTINUED | OUTPATIENT
Start: 2024-02-09 | End: 2024-02-09

## 2024-02-08 RX ORDER — ASPIRIN 81 MG/1
81 TABLET ORAL DAILY
Status: DISCONTINUED | OUTPATIENT
Start: 2024-02-09 | End: 2024-02-09

## 2024-02-08 RX ORDER — POLYETHYLENE GLYCOL 3350 17 G/17G
1 POWDER, FOR SOLUTION ORAL
Status: CANCELLED | OUTPATIENT
Start: 2024-02-08

## 2024-02-08 RX ORDER — HEPARIN SODIUM 5000 [USP'U]/ML
5000 INJECTION, SOLUTION INTRAVENOUS; SUBCUTANEOUS EVERY 8 HOURS
Status: CANCELLED | OUTPATIENT
Start: 2024-02-08

## 2024-02-08 RX ORDER — ZINC SULFATE 50(220)MG
220 CAPSULE ORAL
Status: DISCONTINUED | OUTPATIENT
Start: 2024-02-09 | End: 2024-02-09

## 2024-02-08 RX ORDER — DEXTROSE MONOHYDRATE 25 G/50ML
25 INJECTION, SOLUTION INTRAVENOUS
Status: DISCONTINUED | OUTPATIENT
Start: 2024-02-08 | End: 2024-02-22 | Stop reason: HOSPADM

## 2024-02-08 RX ORDER — CHOLESTYRAMINE LIGHT 4 G/5.7G
1 POWDER, FOR SUSPENSION ORAL 2 TIMES DAILY
Status: CANCELLED | OUTPATIENT
Start: 2024-02-08

## 2024-02-08 RX ORDER — LIDOCAINE HYDROCHLORIDE 10 MG/ML
1 INJECTION, SOLUTION EPIDURAL; INFILTRATION; INTRACAUDAL; PERINEURAL PRN
Status: DISCONTINUED | OUTPATIENT
Start: 2024-02-08 | End: 2024-02-22 | Stop reason: HOSPADM

## 2024-02-08 RX ORDER — OMEPRAZOLE 20 MG/1
20 CAPSULE, DELAYED RELEASE ORAL DAILY
Status: DISCONTINUED | OUTPATIENT
Start: 2024-02-08 | End: 2024-02-08

## 2024-02-08 RX ORDER — CHOLESTYRAMINE LIGHT 4 G/5.7G
1 POWDER, FOR SUSPENSION ORAL 2 TIMES DAILY
Status: DISCONTINUED | OUTPATIENT
Start: 2024-02-08 | End: 2024-02-09

## 2024-02-08 RX ORDER — OXYCODONE HYDROCHLORIDE 10 MG/1
10 TABLET ORAL EVERY 4 HOURS PRN
Status: CANCELLED | OUTPATIENT
Start: 2024-02-08

## 2024-02-08 RX ORDER — BISACODYL 10 MG
10 SUPPOSITORY, RECTAL RECTAL
Status: CANCELLED | OUTPATIENT
Start: 2024-02-08

## 2024-02-08 RX ADMIN — ATORVASTATIN CALCIUM 80 MG: 40 TABLET, FILM COATED ORAL at 21:20

## 2024-02-08 RX ADMIN — INSULIN GLARGINE-YFGN 15 UNITS: 100 INJECTION, SOLUTION SUBCUTANEOUS at 21:23

## 2024-02-08 RX ADMIN — INSULIN HUMAN 4 UNITS: 100 INJECTION, SOLUTION PARENTERAL at 21:22

## 2024-02-08 RX ADMIN — CHOLESTYRAMINE 4 G: 4 POWDER, FOR SUSPENSION ORAL at 05:33

## 2024-02-08 RX ADMIN — OMEPRAZOLE 20 MG: 20 CAPSULE, DELAYED RELEASE ORAL at 05:33

## 2024-02-08 RX ADMIN — ASPIRIN 81 MG: 81 TABLET, COATED ORAL at 05:33

## 2024-02-08 RX ADMIN — CALCIUM 500 MG: 500 TABLET ORAL at 17:30

## 2024-02-08 RX ADMIN — INSULIN HUMAN 4 UNITS: 100 INJECTION, SOLUTION PARENTERAL at 17:31

## 2024-02-08 RX ADMIN — HEPARIN SODIUM 5000 UNITS: 5000 INJECTION, SOLUTION INTRAVENOUS; SUBCUTANEOUS at 05:33

## 2024-02-08 RX ADMIN — HEPARIN SODIUM 5000 UNITS: 5000 INJECTION, SOLUTION INTRAVENOUS; SUBCUTANEOUS at 15:21

## 2024-02-08 RX ADMIN — CHOLESTYRAMINE 4 G: 4 POWDER, FOR SUSPENSION ORAL at 15:20

## 2024-02-08 RX ADMIN — HEPARIN SODIUM 5000 UNITS: 5000 INJECTION, SOLUTION INTRAVENOUS; SUBCUTANEOUS at 21:32

## 2024-02-08 RX ADMIN — OXYCODONE HYDROCHLORIDE AND ACETAMINOPHEN 500 MG: 500 TABLET ORAL at 17:31

## 2024-02-08 RX ADMIN — CALCIUM 500 MG: 500 TABLET ORAL at 08:54

## 2024-02-08 ASSESSMENT — LIFESTYLE VARIABLES
HAVE PEOPLE ANNOYED YOU BY CRITICIZING YOUR DRINKING: NO
AVERAGE NUMBER OF DAYS PER WEEK YOU HAVE A DRINK CONTAINING ALCOHOL: 0
TOTAL SCORE: 0
ALCOHOL_USE: NO
HAVE YOU EVER FELT YOU SHOULD CUT DOWN ON YOUR DRINKING: NO
ON A TYPICAL DAY WHEN YOU DRINK ALCOHOL HOW MANY DRINKS DO YOU HAVE: 0
HOW MANY TIMES IN THE PAST YEAR HAVE YOU HAD 5 OR MORE DRINKS IN A DAY: 0
TOTAL SCORE: 0
EVER HAD A DRINK FIRST THING IN THE MORNING TO STEADY YOUR NERVES TO GET RID OF A HANGOVER: NO
EVER FELT BAD OR GUILTY ABOUT YOUR DRINKING: NO
CONSUMPTION TOTAL: NEGATIVE
TOTAL SCORE: 0

## 2024-02-08 ASSESSMENT — PATIENT HEALTH QUESTIONNAIRE - PHQ9
2. FEELING DOWN, DEPRESSED, IRRITABLE, OR HOPELESS: NOT AT ALL
1. LITTLE INTEREST OR PLEASURE IN DOING THINGS: NOT AT ALL
SUM OF ALL RESPONSES TO PHQ9 QUESTIONS 1 AND 2: 0
SUM OF ALL RESPONSES TO PHQ9 QUESTIONS 1 AND 2: 0
1. LITTLE INTEREST OR PLEASURE IN DOING THINGS: NOT AT ALL
2. FEELING DOWN, DEPRESSED, IRRITABLE, OR HOPELESS: NOT AT ALL
2. FEELING DOWN, DEPRESSED, IRRITABLE, OR HOPELESS: NOT AT ALL
SUM OF ALL RESPONSES TO PHQ9 QUESTIONS 1 AND 2: 0
1. LITTLE INTEREST OR PLEASURE IN DOING THINGS: NOT AT ALL

## 2024-02-08 ASSESSMENT — ENCOUNTER SYMPTOMS
VOMITING: 0
FEVER: 0
NAUSEA: 0
COUGH: 0
CHILLS: 0
SHORTNESS OF BREATH: 0

## 2024-02-08 ASSESSMENT — PAIN DESCRIPTION - PAIN TYPE
TYPE: SURGICAL PAIN;NEUROPATHIC PAIN
TYPE: SURGICAL PAIN

## 2024-02-08 ASSESSMENT — FIBROSIS 4 INDEX: FIB4 SCORE: 2.4

## 2024-02-08 NOTE — PROGRESS NOTES
Nephrology Daily Progress Note    Date of Service  2/8/2024    Chief Complaint  53 y.o. male admitted 1/25/2024 with diabetic foot infection, complicated by AXEL on CKD stage III, required dialysis    Interval Problem Update  Patient has no chest pain or shortness of breath today  He is being evaluated for inpatient rehab  2/8 patient has no new complaints  Plan to transfer to rehab today  Review of Systems  Review of Systems   Constitutional:  Negative for chills, fever and malaise/fatigue.   Respiratory:  Negative for cough and shortness of breath.    Cardiovascular:  Negative for chest pain and leg swelling.   Gastrointestinal:  Negative for nausea and vomiting.   Genitourinary:  Negative for dysuria, frequency and urgency.        Physical Exam  Temp:  [36.1 °C (96.9 °F)-36.7 °C (98.1 °F)] 36.6 °C (97.9 °F)  Pulse:  [74-78] 78  Resp:  [16-18] 18  BP: (145-166)/(80-90) 155/90  SpO2:  [95 %-99 %] 97 %    Physical Exam  Vitals and nursing note reviewed.   Constitutional:       General: He is not in acute distress.     Appearance: He is not ill-appearing.   HENT:      Head: Normocephalic and atraumatic.      Right Ear: External ear normal.      Left Ear: External ear normal.      Nose: Nose normal.   Eyes:      General:         Right eye: No discharge.         Left eye: No discharge.      Conjunctiva/sclera: Conjunctivae normal.   Cardiovascular:      Rate and Rhythm: Normal rate and regular rhythm.      Heart sounds: No murmur heard.  Pulmonary:      Effort: Pulmonary effort is normal. No respiratory distress.      Breath sounds: Normal breath sounds. No wheezing.   Musculoskeletal:         General: No tenderness or deformity.      Right lower leg: No edema.      Left lower leg: No edema.   Skin:     General: Skin is warm.   Neurological:      General: No focal deficit present.      Mental Status: He is alert and oriented to person, place, and time.   Psychiatric:         Mood and Affect: Mood normal.          "Behavior: Behavior normal.       Fluids    Intake/Output Summary (Last 24 hours) at 2/8/2024 1204  Last data filed at 2/8/2024 1000  Gross per 24 hour   Intake 220 ml   Output 700 ml   Net -480 ml         Laboratory  Recent Labs     02/06/24  0316 02/07/24  0209 02/08/24  0400   WBC 7.1 7.3 7.2   RBC 2.53* 3.14* 2.86*   HEMOGLOBIN 7.3* 9.2* 8.3*   HEMATOCRIT 22.0* 27.4* 25.1*   MCV 87.0 87.3 87.8   MCH 28.9 29.3 29.0   MCHC 33.2 33.6 33.1   RDW 42.5 43.3 43.4   PLATELETCT 132* 133* 151*   MPV 9.7 10.1 9.7       Recent Labs     02/06/24  0230 02/07/24  0209 02/08/24  0400   SODIUM 132* 132* 131*   POTASSIUM 4.2 4.3 3.9   CHLORIDE 100 102 101   CO2 25 23 25   GLUCOSE 222* 211* 134*   BUN 24* 27* 23*   CREATININE 2.87* 3.13* 2.54*   CALCIUM 6.5* 7.0* 7.2*           No results for input(s): \"NTPROBNP\" in the last 72 hours.        Imaging  MR-FOOT-W/O LEFT   Final Result      1.  Marrow edema involving the fifth metatarsal head with also seen marrow edema and fragmentation of the fifth proximal phalanx and marrow edema involving the middle and distal phalanges. This most likely represents osteomyelitis with pathologic    fracture. There is surrounding cellulitis.      2.  Mild marrow edema involving the fourth metatarsal head and the fourth proximal phalanx possibly reactive in nature versus osteomyelitis.      3.  Edema/induration of the soft tissues of the forefoot, midfoot and hindfoot with also seen involvement of the intrinsic muscles of the foot likely representing cellulitis and myositis. There are punctate areas of gas within the soft tissues of the    forefoot and midfoot which may indicate areas of tissue necrosis.      4.  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      IR-BRADLEY SABA PLACEMENT >5   Final Result      1. ULTRASOUND AND FLUOROSCOPIC GUIDED PLACEMENT OF A Right INTERNAL JUGULAR 14.5 [place] GLIDE PATH TUNNELED DIALYSIS CATHETER.      2. THE HEMODIALYSIS CATHETER MAY BE USED " IMMEDIATELY AS CLINICALLY INDICATED. FLUSHES PER PROTOCOL.         US-FLOWER SINGLE LEVEL BILAT   Final Result      DX-CHEST-PORTABLE (1 VIEW)   Final Result      CT-FOOT W/O PLUS RECONS LEFT   Final Result      1.  Findings are consistent with osteomyelitis and pathologic fracture involving the proximal phalanx of the fifth digit.      2.  Soft tissue swelling and emphysema is present around the fifth digit and in the plantar soft tissues of the foot. These findings are consistent with cellulitis.      DX-FOOT-COMPLETE 3+ LEFT   Final Result      1.  Comminuted fracture proximal phalanx fifth digit is identified which could be pathologic and related to osteomyelitis.      2.  Soft tissue swelling and emphysema in the fifth digit which could be due to cellulitis.      DX-CHEST-PORTABLE (1 VIEW)   Final Result         No acute cardiac or pulmonary abnormality is identified.            Assessment/Plan  1 AXEL on CKD stage IIIb: Etiology is most likely ATN, nonoliguric, possible early renal recovery  2 septic shock  3 anemia  4 osteomyelitis  no acute need for HD today  Check 24-hour urine for creatinine clearance  Renal diet  Daily BMP, CBC.  Renal dose all meds  Avoid nephrotoxins like NSAIDs.  Prognosis guarded.

## 2024-02-08 NOTE — PROGRESS NOTES
Discharge instructions reviewed with pt, AVS signed, L IJ removed and pressure dressing placed. Report called to [facility] RN. Copy of AVS placed in [employer] packet.

## 2024-02-08 NOTE — H&P
Physical Medicine & Rehabilitation  History and Physical (H&P)  &     Post Admission Physician Evaluation (JUSTINE)       Date of Admission: 2/8/2024   Date of Service: 2/8/2024   Beni Moore  Room/bed 31/02    [facility] Code to Support Admission: 0005.4 - Amputation: Unilateral Lower Limb Below the Knee (BK)  Etiologic Diagnosis: Hx of BKA, left (HCC)    _______________________________________________    Chief Complaint: Left BKA    History of Present Illness:    WILFREDO Moore is a 53-year-old male with history significant for chronic kidney disease due to diabetic nephropathy, chronic thrombotic microangiopathy, insulin-dependent diabetes mellitus, peripheral neuropathy, neurogenic bowel secondary to diabetes who presented to [facility] on 1/25/2024 with infection of the left foot.  X-ray revealed pathologic fracture and osteomyelitis with gas in the soft tissues.  White count was significantly elevated in the 20s.  His creatinine was elevated in the sixes and he was hypotensive.  He was placed on IV antibiotics.  Orthopedics and nephrology were consulted.  He ultimately underwent left BKA on 2/1/2024 by Dr. Mcmanus and for source control.  He is no longer on antibiotics.  A tunneled dialysis catheter was placed and he was started on hemodialysis 1/31/2024.  He did have acute blood loss anemia in the setting of chronic kidney disease and recent operation and required 1 unit packed red blood cell.  Dialysis is on Mondays Wednesdays and Fridays.    Patient admitted to acute rehab unit 2/8/2024.  On admission he reports that he has had appear to be here.  He has some questions regarding the logistics of how the rehab hospital works.  He also would like to be placed on cholestyramine at 10 AM and 5 PM to manage his bowels.  He will be going home with his parents who will assist him until he can be more independent.  On admission nursing concerned for bleeding from medial aspect of incision as well as  erythema of the residual limb.  Patient does not have any pain.    Review of Systems:     14 point ROS reviewed and negative except as stated above.      Past Medical History:  Past Medical History:   Diagnosis Date    Anesthesia     Hypotension post-op, persisted for a few months    Anesthesia 12/06/2023    Patient stated after 2016 colonoscopy he became hypotensive.    Bowel habit changes     History GI problems    Cataract     IOL - Left eye    Cataract     IOL OD    Deaf, right     Diabetes (HCC)     Oral medications & insulin    Heart burn     GERD; takes Prilosec    Hemorrhagic disorder (HCC)     ASA protection for stent and cardiac history    History of non-ST elevation myocardial infarction (NSTEMI)     Per Problem List    Hx of heart artery stent 2019    Hyperlipidemia     Hyperparathyroidism due to renal insufficiency (HCC)     Per Problem List    Hypertension     Myocardial infarct ([facility])     Pneumonia     H/O    Renal disorder 12/06/2023    Stage 3 CKD       Past Surgical History:  Past Surgical History:   Procedure Laterality Date    KNEE AMPUTATION BELOW Left 2/1/2024    Procedure: AMPUTATION, BELOW KNEE;  Surgeon: Celestino Segura M.D.;  Location: [facility];  Service: Orthopedics    TOE AMPUTATION Left 1/30/2024    Procedure: AMPUTATION, TOE- 5TH;  Surgeon: Celestino Segura M.D.;  Location: [facility];  Service: Orthopedics    PB OPEN TREATMENT PBOX HUMERAL FRACTURE Left 12/11/2023    Procedure: LEFT OPEN REDUCTION INTERNAL FIXATION  HUMERUS, PROXIMAL AND LEFT BICEPS TENODESIS;  Surgeon: Aris Pierre M.D.;  Location: [facility];  Service: Orthopedics    PB REPAIR BICEPS LONG TENDON Left 12/11/2023    Procedure: TENODESIS, BICEPS, OPEN;  Surgeon: Aris Pierre M.D.;  Location: [facility];  Service: Orthopedics    CATARACT EXTRACTION WITH IOL Right 2023    TOE AMPUTATION Left 09/30/2021    Procedure: AMPUTATION, TOE;  Surgeon: Tomer Hart M.D.;   Location: SURGERY [facility];  Service: Orthopedics    STENT PLACEMENT  2019    STEMI with nonobstructive LAD    TOE AMPUTATION Right 07/02/2018    Procedure: Transmetatarsal amputation;  Surgeon: Ravi Bernardo M.D.;  Location: SURGERY [facility];  Service: Orthopedics    ACHILLES TENDON REPAIR Right 07/02/2018    Procedure: ACHILLES LENGTHENING;  Surgeon: Ravi Bernardo M.D.;  Location: SURGERY [facility];  Service: Orthopedics    IRRIGATION & DEBRIDEMENT ORTHO Right 07/02/2018    Procedure: IRRIGATION & DEBRIDEMENT ORTHO;  Surgeon: Ravi Bernardo M.D.;  Location: SURGERY [facility];  Service: Orthopedics    OTHER Left     IOL    OTHER ABDOMINAL SURGERY      OTHER ORTHOPEDIC SURGERY      TONSILLECTOMY      VASECTOMY         Family History:  Family History   Problem Relation Age of Onset    No Known Problems Mother     Diabetes Father     Heart Disease Father     Diabetes Maternal Grandmother     Heart Disease Maternal Grandfather     Lung Disease Paternal Grandmother     Cancer Paternal Grandmother     Cancer Paternal Grandfather     Lung Cancer Paternal Grandfather     Kidney Disease Neg Hx        Medications:  Current Facility-Administered Medications   Medication Dose    Respiratory Therapy Consult      hydrALAZINE (Apresoline) tablet 25 mg  25 mg    carboxymethylcellulose (Refresh Tears) 0.5 % ophthalmic drops 1 Drop  1 Drop    benzocaine-menthol (Cepacol) lozenge 1 Lozenge  1 Lozenge    mag hydrox-al hydrox-simeth (Maalox Plus Es Or Mylanta Ds) suspension 20 mL  20 mL    ondansetron (Zofran ODT) dispertab 4 mg  4 mg    Or    ondansetron (Zofran) syringe/vial injection 4 mg  4 mg    traZODone (Desyrel) tablet 50 mg  50 mg    sodium chloride (Ocean) 0.65 % nasal spray 2 Spray  2 Spray    [START ON 2/9/2024] therapeutic multivitamin-minerals (Theragran-M) tablet 1 Tablet  1 Tablet    ascorbic acid (Vitamin C) tablet 500 mg  500 mg    [START ON 2/9/2024] zinc sulfate (Zincate) capsule 220 mg   220 mg    [START ON 2/9/2024] aspirin EC tablet 81 mg  81 mg    atorvastatin (Lipitor) tablet 80 mg  80 mg    calcium carbonate (Os-Quinton 500) tablet 500 mg  500 mg    [START ON 2/9/2024] epoetin (Retacrit) injection (Dialysis use only) 5,000 Units  5,000 Units    heparin injection 5,000 Units  5,000 Units    insulin GLARGINE (Lantus,Semglee) injection  15 Units    insulin regular (HumuLIN R,NovoLIN R) injection  3-14 Units    And    dextrose 50% (D50W) injection 25 g  25 g    [START ON 2/9/2024] omeprazole (PriLOSEC) capsule 20 mg  20 mg    Pharmacy Consult Request ...Pain Management Review 1 Each  1 Each    senna-docusate (Pericolace Or Senokot S) 8.6-50 MG per tablet 2 Tablet  2 Tablet    And    polyethylene glycol/lytes (Miralax) Packet 1 Packet  1 Packet    And    bisacodyl (Dulcolax) suppository 10 mg  10 mg    acetaminophen (Tylenol) tablet 1,000 mg  1,000 mg    heparin injection 3,600 Units  3,600 Units    lidocaine PF (Xylocaine-MPF) 1 % injection 1 mL  1 mL    oxyCODONE immediate-release (Roxicodone) tablet 5 mg  5 mg    Or    oxyCODONE immediate release (Roxicodone) tablet 10 mg  10 mg    cholestyramine (Questran) 4 GM powder 4 g  1 Packet       Allergies:  Patient has no known allergies.    Psychosocial History:  Housing / Facility: 1 Story House  Steps Into Home: 1 (pt plans to have Dad bump him up in , family has done this before with other family members and feel comfortable with this.)  Steps In Home: 0  Lives with - Patient's Self Care Capacity: Parents (home as needed, can assist as needed)  Equipment Owned: Front-Wheel Walker    Level of Function Prior to Disability:  Housing / Facility: 1 [address] House  Steps Into Home: 1 (pt plans to have Dad bump him up in , family has done this before with other family members and feel comfortable with this.)  Steps In Home: 0  Bathroom Set up: Walk In Shower, Bathtub / Shower Combination  Equipment Owned: Front-Wheel Walker  Lives with - Patient's Self Care  Capacity: Parents (home as needed, can assist as needed)  Bed Mobility: Independent  Transfer Status: Independent  Ambulation: Independent  Assistive Devices Used: None  Stairs: Independent    Self Feeding: Independent  Grooming / Hygiene: Independent  Bathing: Independent  Dressing: Independent  Toileting: Independent  Prior Level Of Mobility: Independent Without Device in Community, Independent Without Device in Home  Prior Services: None  Housing / Facility: [address]    Level of Function Prior to Admission to [facility]:  Gait Level Of Assist: Unable to Participate  Supine to Sit: Standby Assist  Sit to Supine: Standby Assist  Scooting: Minimal Assist  Rolling:  (pivoted using rail to come to sit with HOB up.)  Skilled Intervention: Verbal Cuing, Compensatory Strategies  Comments: use of bed rails  Sit to Stand: Unable to Participate  Bed, Chair, Wheelchair Transfer: Moderate Assist  Transfer Method: Sit Pivot  Skilled Intervention: Verbal Cuing, Compensatory Strategies  Sitting Edge of Bed: 15+    Lower Body Dressing: Moderate Assist (mom assisting with threading BLE through underwear, pt able to bridge in bed and pull them up on his own)  Toileting:  (declined need)    CURRENT LEVEL OF FUNCTION:   Same as level of function prior to admission to [facility]    Physical Examination:     VITAL SIGNS:   height is 1.829 m (6') and weight is 102 kg (225 lb). His oral temperature is 36.4 °C (97.6 °F). His blood pressure is 160/88 (abnormal) and his pulse is 70. His respiration is 18 and oxygen saturation is 96%.   GENERAL: No apparent distress  HEENT: Normocephalic/atraumatic  CARDIAC: Regular rate and rhythm  LUNGS: Clear to auscultation   ABDOMINAL: bowel sounds present, soft, and nontender    EXTREMITIES: Diffusely edematous in both legs up into medial thigh 1+ pitting edema.  NEURO:  Mental status:  A&Ox4 (person, place, date, situation) answers questions appropriately  follows commands  Speech: fluent, no aphasia or dysarthria    Motor Exam Upper Extremities   ? Myotome R L   Shoulder Abduction C5 5* 5   Elbow flexion C5 5 5   Wrist extension C6 5 5   Elbow extension C7 5 5   Finger flexion C8 5 5   Finger abduction T1 5 5   *Limited by HD access    Motor Exam Lower Extremities  ? Myotome R L   Hip flexion L2 5 5   Knee extension L3 5 5   Ankle dorsiflexion L4 5 JENNIFER   Toe extension L5 JENNIFER JENNIFER   Ankle plantarflexion S1 5 JENNIFER     Incision on admission:       Radiology:  MR-FOOT-W/O LEFT  Final Result     1.  Marrow edema involving the fifth metatarsal head with also seen marrow edema and fragmentation of the fifth proximal phalanx and marrow edema involving the middle and distal phalanges. This most likely represents osteomyelitis with pathologic   fracture. There is surrounding cellulitis.     2.  Mild marrow edema involving the fourth metatarsal head and the fourth proximal phalanx possibly reactive in nature versus osteomyelitis.     3.  Edema/induration of the soft tissues of the forefoot, midfoot and hindfoot with also seen involvement of the intrinsic muscles of the foot likely representing cellulitis and myositis. There are punctate areas of gas within the soft tissues of the   forefoot and midfoot which may indicate areas of tissue necrosis.     4.  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     IR-SABA,GROSHONG PLACEMENT >5  Final Result     1. ULTRASOUND AND FLUOROSCOPIC GUIDED PLACEMENT OF A Right INTERNAL JUGULAR 14.5 Hungarian GLIDE PATH TUNNELED DIALYSIS CATHETER.     2. THE HEMODIALYSIS CATHETER MAY BE USED IMMEDIATELY AS CLINICALLY INDICATED. FLUSHES PER PROTOCOL.        US-FLOWER SINGLE LEVEL BILAT  Final Result     DX-CHEST-PORTABLE (1 VIEW)  Final Result     CT-FOOT W/O PLUS RECONS LEFT  Final Result     1.  Findings are consistent with osteomyelitis and pathologic fracture involving the proximal phalanx of the fifth digit.     2.  Soft tissue swelling  and emphysema is present around the fifth digit and in the plantar soft tissues of the foot. These findings are consistent with cellulitis.     DX-FOOT-COMPLETE 3+ LEFT  Final Result     1.  Comminuted fracture proximal phalanx fifth digit is identified which could be pathologic and related to osteomyelitis.     2.  Soft tissue swelling and emphysema in the fifth digit which could be due to cellulitis.     DX-CHEST-PORTABLE (1 VIEW)  Final Result        No acute cardiac or pulmonary abnormality is identified.           Laboratory Values:  Recent Labs     02/06/24  0230 02/07/24  0209 02/08/24  0400   SODIUM 132* 132* 131*   POTASSIUM 4.2 4.3 3.9   CHLORIDE 100 102 101   CO2 25 23 25   GLUCOSE 222* 211* 134*   BUN 24* 27* 23*   CREATININE 2.87* 3.13* 2.54*   CALCIUM 6.5* 7.0* 7.2*     Recent Labs     02/06/24  0316 02/07/24  0209 02/08/24  0400   WBC 7.1 7.3 7.2   RBC 2.53* 3.14* 2.86*   HEMOGLOBIN 7.3* 9.2* 8.3*   HEMATOCRIT 22.0* 27.4* 25.1*   MCV 87.0 87.3 87.8   MCH 28.9 29.3 29.0   MCHC 33.2 33.6 33.1   RDW 42.5 43.3 43.4   PLATELETCT 132* 133* 151*   MPV 9.7 10.1 9.7           Primary Rehabilitation Diagnosis:    This patient is a 53 y.o. male admitted for acute inpatient rehabilitation with Hx of BKA, left (HCC).    Impairments:   ADLs/IADLs  Mobility    Secondary Diagnosis/Medical Co-morbidities Affecting Function  ESRD on HD, ABLA, neuropathic pain, nociceptive pain, impaired ADLs, impaired mobility.     Relevant Changes Since Preadmission Evaluation:    Status unchanged    The patient's rehabilitation potential is Very Good  The patient's medical prognosis is good    Rehabilitation Plan:   Discussion and Recommendations:   1. The patient requires an acute inpatient rehabilitation program with a coordinated program of care at an intensity and frequency not available at a lower level of care. This recommendation is substantiated by the patient's medical physicians who recommend that the patient's intervention  and assessment of medical issues needs to be done at an acute level of care for patient's safety and maximum outcome.   2. A coordinated program of care will be supplied by an interdisciplinary team of physical therapy, occupational therapy, rehab physician, rehab nursing, and, if needed, speech therapy and rehab psychology. Rehab team presents a patient-specific rehabilitation and education program concentrating on prevention of future problems related to accessibility, mobility, skin, bowel, bladder, sexuality, and psychosocial and medical/surgical problems.   3. Need for Rehabilitation Physician: The rehab physician will be evaluating the patient on a multi-weekly basis to help coordinate the program of care. The rehab physician communicates between medical physicians, therapists, and nurses to maximize the patient's potential outcome. Specific areas in which the rehab physician will be providing daily assessment include the following:   A. Assessing the patient's heart rate and blood pressure response (vitals monitoring) to activity and making adjustments in medications or conservative measures as needed.   B. The rehab physician will be assessing the frequency at which the program can be increased to allow the patient to reach optimal functional outcome.   C. The rehab physician will also provide assessments in daily skin care, especially in light of patient's impairments in mobility.   D. The rehab physician will provide special expertise in understanding how to work with functional impairment and recommend appropriate interventions, compensatory techniques, and education that will facilitate the patient's outcome.   4. Rehab R.N.   The rehab RN will be working with patient to carry over in room mobility and activities of daily living when the patient is not in 3 hours of skilled therapy. Rehab nursing will be working in conjunction with rehab physician to address all the medical issues above and continue to  assess laboratory work and discuss abnormalities with the treating physicians, assess vitals, and response to activity, and discuss and report abnormalities with the rehab physician. Rehab RN will also continue daily skin care, supervise bladder/bowel program, instruct in medication administration, and ensure patient safety.   5. Rehab Therapy: Therapies to treat at intensity and frequency of (may change after completion of evaluation by all therapeutic disciplines):       PT:  Physical therapy to address mobility, transfer, gait training and evaluation for adaptive equipment needs 1.5 hour/day at least 5 days/week for the duration of the ELOS (see below)       OT:  Occupational therapy to address ADLs, self-care, home management training, functional mobility/transfers and assistive device evaluation, and community re-integration 1.5 hour/day at least 5 days/week for the duration of the ELOS (see below).        ST/Dysphagia:  Speech therapy to address speech, language, and cognitive deficits as well as swallowing difficulties with retraining/dysphagia management and community re-integration with comprehension, expression, cognitive training 0 hour/day at least 5 days/week for the duration of the ELOS (see below).     Medical management / Rehabilitation Issues/ Adverse Potential as part of rehabilitation plan     Rehabilitation Issues/Adverse Potential  1.  Left BKA: Patient demonstrates functional deficits in strength, balance, coordination, and ADL's. Patient is admitted to [facility] for comprehensive rehabilitation therapy as described below.   Rehabilitation nursing monitors bowel and bladder control, educates on medication administration, co-morbidities and monitors patient safety.    2.  Neurostimulants: None at this time but continue to assess daily for need to initiate should status change.    3.  DVT prophylaxis:  Patient is on Heparin for anticoagulation upon transfer. Encourage OOB.  Monitor daily for signs and symptoms of DVT including but not limited to swelling and pain to prevent the development of DVT that may interfere with therapies.    4.  GI prophylaxis:  On prilosec to prevent gastritis/dyspepsia which may interfere with therapies.    5.  Pain: Controlled with Tylenol and Oxycodone    6.  Nutrition/Dysphagia: Dietician monitors nutrient intake, recommend supplements prn and provide nutrition education to pt/family to promote optimal nutrition for wound healing/recovery.     7.  Bladder/bowel:  Start bowel and bladder program as described below, to prevent constipation, urinary retention (which may lead to UTI), and urinary incontinence (which will impact upon pt's functional independence).   - TV Q3h while awake with post void bladder scans, I&O cath for PVRs >400  - up to commode after meal     8.  Skin/dermal ulcer prophylaxis: Monitor for new skin conditions with q.2 h. turns as required to prevent the development of skin breakdown.     9.  Cognition/Behavior: As needed psychologist provides adjustment counseling to illness and psychosocial barriers that may be potential barriers to rehabilitation.     10. Respiratory therapy: RT performs O2 management prn, breathing retraining, pulmonary hygiene and bronchospasm management prn to optimize participation in therapies.     Medical Co-Morbidities/Adverse Potential Affecting Function:    Left BKA secondary to acute on chronic osteomyelitis in setting of diabetes mellitus  Osteomyelitis resolved with infection control with BKA and s/p IV antibiotics  PT and OT for mobility and ADLs. Per guidelines, 15 hours per week between PT, OT and/or SLP.  Follow-up orthopedics    Acute blood loss anemia  Postoperative and CKD, required 1 unit PRBC    Acute renal failure stage IIIb CKD now on HD  S/p right IJ tunneled cath placement 1/29/2024  Hemodialysis Monday, Wednesday, Friday  Nephrology following    Type 2 diabetes mellitus with hyperglycemia,  insulin-dependent  Long-term complications causing CKD, peripheral neuropathy, neurogenic bowel  Continue long-acting and short acting insulin    Hypocalcemia  Continue supplementing    History of CAD s/p ARTURO 9/22/2019  Last cardiology note 7/7/2023: On atorvastatin 80 mg, aspirin 81 mg, carvedilol 3.125 mg twice daily, Jardiance 25 mg daily  Continue aspirin 81 mg daily and statin 80 mg nightly    Hypertension  Last cardiology note 7/7/2023: Previously had been on carvedilol 3.125 mg twice daily and enalapril 10 mg twice daily  Monitor    Pain  Currently not reporting pain  As needed Tylenol  As needed oxycodone    Skin  Patient at risk for skin breakdown due to debility in areas including sacrum, achilles, elbows and head in addition to other sites. Nursing to assess skin daily.     GI Ppx  Patient on Prilosec for GERD prophylaxis.     Bowel   Neurogenic bowel secondary to long-term complications from diabetes  Colestyramine at 10 AM and 5 PM  Follows with GI  Patient on Senna-docusate for constipation prophylaxis.     Bladder  TV/PVR/BS PRN      Upcoming Labs/imaging: Admission Labs    DVT PROPHYLAXIS: Heparin 5000 units q8 hrs    HOSPITALIST FOLLOWING: Yes - consult placed on admission. Hospitalist to see 2/9    CODE STATUS: FULL CODE    DISPO: Home with supportive parents.     LISE: TBD    ADDITIONAL MEDICAL NEEDS ON D/C (IV abx, O2, etc): TBD    MEDS SENT TO: TBD    DISCHARGE SPECIALIST FOLLOW UP: Orthopedics (ROCK Segura)    Patient to scheduled follow up with their PCP within 2 weeks from discharge from the [facility].     I personally performed a complete drug regimen review and no potential clinically significant medication issues were identified.     Goals/Expected Level of Function Based on Current Medical and Functional Status:  (may change based on patient's medical status and rate of impairment recovery)  Transfers:   Modified Independent  Mobility/Gait:   Modified  Independent  ADL's:   Modified Independent    DISPOSITION: Discharge to pre-morbid independent living setting with the supportive care of patient's family.    ELOS: 7-14 days  ____________________________________    Dr. Mena Ny DO, MS  [facility] - Physical Medicine & Rehabilitation   ____________________________________    Pt was seen today for 75 min, and entire time spent in face-to-face contact was >50% in counseling and coordination of care as detailed in A/P above.

## 2024-02-08 NOTE — FLOWSHEET NOTE
02/08/24 1214   Events/Summary/Plan   Events/Summary/Plan RT Consult   Vital Signs   Pulse 70   Respiration 18   Pulse Oximetry 96 %   $ Pulse Oximetry (Spot Check) Yes   Respiratory Assessment   Level of Consciousness Alert   Chest Exam   Work Of Breathing / Effort Within Normal Limits   Breath Sounds   RUL Breath Sounds Clear   RML Breath Sounds Clear   RLL Breath Sounds Clear   CHRIS Breath Sounds Clear   LLL Breath Sounds Clear   Oxygen   O2 Delivery Device Room air w/o2 available   Smoking History   Have you ever smoked   (Quit 14yrs ago)

## 2024-02-08 NOTE — DISCHARGE PLANNING
Case Management Discharge Planning    Admission Date: 1/25/2024  GMLOS: 9.9  ALOS: 14    6-Clicks ADL Score: 15  6-Clicks Mobility Score: 9  PT and/or OT Eval ordered: Yes  Post-acute Referrals Ordered: Yes  Post-acute Choice Obtained: Yes  Has referral(s) been sent to post-acute provider:  Yes      Anticipated Discharge Dispo: Discharge Disposition: Disch to  rehab facility or distinct part unit (62)  Discharge Address: [address] [place] [address]  Discharge Contact Phone Number: [phone]    DME Needed: No    Action(s) Taken: Pt discussed in rounds. MC and okay to D/C to [facility]. GMT to  pt between 12-1230pm.     Escalations Completed: None    Medically Clear: Yes    Next Steps: F/U with medical team regarding D/C needs/ barriers as they arise.    Barriers to Discharge: None    Is the patient up for discharge tomorrow: No

## 2024-02-08 NOTE — PROGRESS NOTES
Patient admitted to facility at 1207 via GMT; accompanied by hospital transport.  Patient assisted to room and positioned in bed for comfort and safety; call light within reach.  Patient assisted with stowing belongings and oriented to room and facility.      Waffle placed.    Dressing changed at admit - LEAH hot to touch and red - MD notified and evaluated.

## 2024-02-08 NOTE — PROGRESS NOTES
Hospital Medicine Daily Progress Note    Date of Service  2/7/2024    Chief Complaint  Beni Moore is a 53 y.o. male admitted 1/25/2024 with toe infection    Hospital Course     53 y.o. 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  X-ray revealed pathologic fracture and was consistent with osteomyelitis with gas in the soft tissues.  WBC count was 24.7 thousand.  His creatinine was quite elevated at 6.36.  He was hypotensive.  He was placed on IV Zosyn and Zyvox, along with IV fluids.  Orthopedics and nephrology were consulted.  Septic shock has improved, and patient subsequently underwent left fifth ray amputation through metatarsal shaft with irrigation and debridement of left plantar foot abscess. Due to ongoing elevated creatinine, a tunneled dialysis catheter was placed and patient started on hemodialysis on 1/31/2024.  Surgical cultures remain negative.  Blood cultures remain negative.  Patient underwent through left BKA on 2/1/2024 by Dr. Segura.  Patient had session of hemodialysis today on 2/5.  Per physical therapy evaluation on 2/25 he is not safe to go home and recommending SNF.  Consulted PMR Dr. Quintero will evaluate for further recommendation      Interval Problem Update  I reviewed the chart along with vitals, labs, imaging, test (both pending and resulted) and recommendations from specialists and interdisciplinary team.  He is stable. Good mood. No new issues or complaints. He is resting. Central line noted.   Managing sepsis from infected foot now s/p L BKA and completed course of antibiotics  He has chronic kidney disease and has now gone on to dialysis. K 4.3, bicarb  "23 Cr- 3.13  PT/OT recommends skilled. Rehab evaluating and has accepted. Patient pending acceptance, questions about affording it \"financial plan\". Will need outpatient dialysis chair as well.    Rehab likely TOMORROW    I have discussed this patient's plan of care and discharge plan at IDT rounds today with Case Management, Nursing, Nursing leadership, and other members of the IDT team.    Consultants/Specialty  nephrology and orthopedics    Code Status  Full Code    Disposition  The patient is medically cleared for discharge to home or a post-acute facility.  Anticipate discharge to: an inpatient rehabilitation hospital    I have placed the appropriate orders for post-discharge needs.    Review of Systems  Review of Systems   Musculoskeletal:         Left BKA site pain        Physical Exam  Temp:  [36.6 °C (97.8 °F)-36.8 °C (98.2 °F)] 36.7 °C (98.1 °F)  Pulse:  [70-77] 75  Resp:  [16-17] 17  BP: (144-162)/(73-97) 162/80  SpO2:  [94 %-95 %] 95 %    Physical Exam  Constitutional:       Appearance: Normal appearance. He is obese.   HENT:      Mouth/Throat:      Mouth: Mucous membranes are dry.   Neck:      Comments: Temp cath central line  Cardiovascular:      Rate and Rhythm: Normal rate and regular rhythm.   Pulmonary:      Effort: Pulmonary effort is normal.      Breath sounds: Normal breath sounds.   Abdominal:      General: There is no distension.   Musculoskeletal:      Comments: Left BKA, wrapped with clean dressing   Neurological:      General: No focal deficit present.      Mental Status: He is alert and oriented to person, place, and time.   Psychiatric:         Mood and Affect: Mood normal.      Comments: Good mood         Fluids    Intake/Output Summary (Last 24 hours) at 2/7/2024 1642  Last data filed at 2/7/2024 1048  Gross per 24 hour   Intake 500 ml   Output 4500 ml   Net -4000 ml         Laboratory  Recent Labs     02/06/24  0230 02/06/24  0316 02/07/24  0209   WBC 9.2 7.1 7.3   RBC 1.73* 2.53* " 3.14*   HEMOGLOBIN 5.0* 7.3* 9.2*   HEMATOCRIT 15.6* 22.0* 27.4*   MCV 90.2 87.0 87.3   MCH 28.9 28.9 29.3   MCHC 32.1* 33.2 33.6   RDW 43.8 42.5 43.3   PLATELETCT 156* 132* 133*   MPV 9.8 9.7 10.1       Recent Labs     02/05/24  0330 02/06/24  0230 02/07/24  0209   SODIUM 128* 132* 132*   POTASSIUM 4.2 4.2 4.3   CHLORIDE 98 100 102   CO2 23 25 23   GLUCOSE 191* 222* 211*   BUN 32* 24* 27*   CREATININE 3.32* 2.87* 3.13*   CALCIUM 6.6* 6.5* 7.0*                     Imaging  MR-FOOT-W/O LEFT   Final Result      1.  Marrow edema involving the fifth metatarsal head with also seen marrow edema and fragmentation of the fifth proximal phalanx and marrow edema involving the middle and distal phalanges. This most likely represents osteomyelitis with pathologic    fracture. There is surrounding cellulitis.      2.  Mild marrow edema involving the fourth metatarsal head and the fourth proximal phalanx possibly reactive in nature versus osteomyelitis.      3.  Edema/induration of the soft tissues of the forefoot, midfoot and hindfoot with also seen involvement of the intrinsic muscles of the foot likely representing cellulitis and myositis. There are punctate areas of gas within the soft tissues of the    forefoot and midfoot which may indicate areas of tissue necrosis.      4.  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      IR-SABA,GROABELINO PLACEMENT >5   Final Result      1. ULTRASOUND AND FLUOROSCOPIC GUIDED PLACEMENT OF A Right INTERNAL JUGULAR 14.5 [place] GLIDE PATH TUNNELED DIALYSIS CATHETER.      2. THE HEMODIALYSIS CATHETER MAY BE USED IMMEDIATELY AS CLINICALLY INDICATED. FLUSHES PER PROTOCOL.         US-FLOWER SINGLE LEVEL BILAT   Final Result      DX-CHEST-PORTABLE (1 VIEW)   Final Result      CT-FOOT W/O PLUS RECONS LEFT   Final Result      1.  Findings are consistent with osteomyelitis and pathologic fracture involving the proximal phalanx of the fifth digit.      2.  Soft tissue swelling and emphysema  "is present around the fifth digit and in the plantar soft tissues of the foot. These findings are consistent with cellulitis.      DX-FOOT-COMPLETE 3+ LEFT   Final Result      1.  Comminuted fracture proximal phalanx fifth digit is identified which could be pathologic and related to osteomyelitis.      2.  Soft tissue swelling and emphysema in the fifth digit which could be due to cellulitis.      DX-CHEST-PORTABLE (1 VIEW)   Final Result         No acute cardiac or pulmonary abnormality is identified.           Assessment/Plan  * Septic shock, diabetic foot infection, CKD on dialysis (HCC)- (present on admission)  Assessment & Plan  Resolved  Managing sepsis from infected foot now s/p L BKA copoleted course of antibiotics  He has chronic kidney disease and has now gone on to dialysis.  PT/OT recommends skilled. Rehab evaluating and has accepted. Patient pending acceptance, questions about affording it \"financial plan\". Will need outpatient dialysis chair as well.    Rehab TOMORROW    Anemia  Assessment & Plan  Transfuse 1 unit PRBC to keep hemoglobin above 7  Need close monitoring for low H&H.  Currently no sign of active sign of bleeding.  Anemia likely from CKD.  Repeat iron panel    2/5/2024  No concern for underlying iron deficiency anemia  H&H remained stable now stable  Recent Labs     02/06/24  0230 02/06/24  0316 02/07/24  0209   HEMOGLOBIN 5.0* 7.3* 9.2*   HEMATOCRIT 15.6* 22.0* 27.4*   MCV 90.2 87.0 87.3   MCH 28.9 28.9 29.3   PLATELETCT 156* 132* 133*     Reviewed iron panel      Hypocalcemia- (present on admission)  Assessment & Plan  Ionized calcium is around 1  Asymptomatic for hypocalcemia  Check vitamin D level  On calcium oxalate    Soft tissue infection of foot- (present on admission)  Assessment & Plan  As above    Osteomyelitis and soft tissue infection of left foot (HCC)- (present on admission)  Assessment & Plan  Noted on CT scan and MRI.  Has extensive soft tissue infection with " osteomyelitis.  Orthopedics on board, s/p left fifth ray amputation through metatarsal shaft with irrigation and debridement of left plantar foot abscess.  Orthopedics planning for BKA today 2/1/2024.  Completed antibiotics.  Post BKA, will have achieved adequate source control and has no further antibiotic needs.  Continue pain control.    Class 1 obesity due to excess calories with body mass index (BMI) of 34.0 to 34.9 in adult  Assessment & Plan  Recommended weight loss advised, 5% through reduced calorie, low carb diet and 150 mins of exercise a week once better  Recommend bariatric surgery evaluation if morbidly obese  Educated on the increase of morbidity and mortality associated with excess weight including DM, Heart Disease, HTN, stroke, and sleep apnea. Recommended outpatient monitoring  of blood sugars, lipid panel, sleep study evaluation for metabolic syndrome.      Type 2 diabetes mellitus with kidney complication, with long-term current use of insulin ([facility])- (present on admission)  Assessment & Plan  HbA1c 7.2.  Continue sliding scale insulin coverage while in house.  Holding long-acting insulin given now that he is hemodialysis dependent and at risk for insulin stacking/accumulation.  Accu-Cheks before meals and at bedtime. Goal to keep BG between 140-180 per 2019 ADA guidelines.  Continue diabetic diet.      Acute renal failure superimposed on stage 3b chronic kidney disease (HCC)- (present on admission)  Assessment & Plan  Patient now started on hemodialysis 1/29/2024.  Nephrology following.  Outpatient hemodialysis chair arranged.           VTE prophylaxis: Heparin subcu    I have performed a physical exam and reviewed and updated ROS and Plan today (2/7/2024). In review of yesterday's note (2/6/2024), there are no changes except as documented above.

## 2024-02-08 NOTE — PROGRESS NOTES
4 Eyes Skin Assessment Completed by JULIANO Villalobos and JULIANO Wei.    Head WDL  Ears WDL  Nose WDL  Mouth WDL  Neck Redness EJ site remains covered removed today  Breast/Chest WDL  Shoulder Blades WDL  Spine WDL  (R) Arm/Elbow/Hand Bruising  (L) Arm/Elbow/Hand Bruising  Abdomen WDL  Groin WDL  Scrotum/Coccyx/Buttocks WDL  (R) Leg Bruising  (L) Leg Redness, Shiny, and Incision hot to touch MD notified  (R) Heel/Foot/Toe WDL no toes  (L) Heel/Foot/Toe none BKA    Devices In Places Leg Immobilizer      Interventions In Place Waffle Overlay      Pictures Uploaded Into Epic Yes  Wound Consult Placed N/A  RN Wound Prevention Protocol Ordered Yes

## 2024-02-08 NOTE — DISCHARGE PLANNING
CASE MANAGEMENT INITIAL ASSESSMENT  Admit Date:  2/8/2024     I met with pt plus his parents (Lazarus and Cristiano)to discuss role of case management / discharge planning / team conference. Patient is a  53 y.o. male transferred from [facility] where pt was hospitalized from 1/25 to 2/8/2024. He is very motivated to be as independent as possible.     Dc Plan:  Patient plans to dc to his parents home in [place] (address:  [address] [place] [address],) SSH w/ 1 RACHELLE; +walk in shower; + bath/shower combo; pt ordered all of his own dme to include: wc/cushion, slide board, fww @ home; pt has a out patient dialysis chair @ [place] [facility] MWF 5:45am; referral has been made to [facility] for prosthetic services.     MD's:  PCP Ruben Pascual, Nephrology Dr Leon; Ortho Dr Segura    Insurance: Medicare Part A/B    Diagnosis: Septic shock  Hx of BKA, left on 2/1     Co-morbidities:   Patient Active Problem List    Diagnosis Date Noted    Hx of BKA, left ([facility]) 02/08/2024    Anemia 02/04/2024    Hypocalcemia 01/30/2024    Septic shock, diabetic foot infection, CKD on dialysis ([facility]) 01/25/2024    Soft tissue infection of foot 01/25/2024    Unspecified fracture of left toe(s), initial encounter for open fracture 01/25/2024    Type 2 diabetes mellitus with proliferative retinopathy without macular edema, without long-term current use of insulin, unspecified laterality ([facility]) 01/18/2024    Hypertensive heart and chronic kidney disease with heart failure and stage 1 through stage 4 chronic kidney disease, or unspecified chronic kidney disease ([facility]) 01/18/2024    Hospital discharge follow-up 01/18/2024    Closed 3-part fracture of proximal humerus 12/06/2023    Hyperparathyroidism due to renal insufficiency ([facility]) 01/10/2023    Hypoglycemia associated with diabetes ([facility]) 11/28/2022    Osteomyelitis and soft tissue infection of left foot ([facility]) 09/30/2021    Presence of stent in coronary artery 09/30/2021    History of non-ST elevation myocardial infarction  (NSTEMI) 09/30/2021    History of COVID-19 09/30/2021    Class 1 obesity due to excess calories with body mass index (BMI) of 34.0 to 34.9 in adult     Hypoglycemia 08/19/2021    Type 2 diabetes mellitus with kidney complication, with long-term current use of insulin ([facility]) 07/16/2021    Retinopathy 01/14/2020    Conductive hearing loss, bilateral 01/14/2020    Coronary artery disease involving native coronary artery of native heart without angina pectoris 10/02/2019    Functional diarrhea 09/27/2019    Hypertension 09/21/2019    NSTEMI (non-ST elevated myocardial infarction) ([facility]) 09/21/2019    Acute renal failure superimposed on stage 3b chronic kidney disease ([facility]) 09/21/2019    S/P transmetatarsal amputation of foot, right ([facility]) 07/05/2018       Prior Level of Function:  Medication Management: Independent  Finances: Independent  Home Management: Independent  Shopping: Independent  Prior Level Of Mobility: Independent Without Device in Community  Driving / Transportation: Driving Independent    POA:    Power of  (Name & Phone): none    Other Information:   Pharmacy: [facility]  Primary Payor Source: Medicare A/B    Patient / Family Goal:  Patient / Family Goal: Improve mobility; return to parents home.    Plan:  1. Continue to follow patient through hospitalization and provide discharge planning in collaboration with patient, family, physicians and ancillary services.     2. Utilize community resources to ensure a safe discharge.   Follow up w/ PCP, Ortho, Nephrology, out pt dialysis, [employer] prosthetic service;  anticipate home health

## 2024-02-08 NOTE — CARE PLAN
The patient is Stable - Low risk of patient condition declining or worsening    Shift Goals  Clinical Goals: managment of new mdical condition  Patient Goals: Same  Family Goals: JENNIFER    Progress made toward(s) clinical / shift goals:       Patient is not progressing towards the following goals:      Problem: Knowledge Deficit - Standard  Goal: Patient and family/care givers will demonstrate understanding of plan of care, disease process/condition, diagnostic tests and medications  2/8/2024 0305 by Ld De Santiago R.N.  Outcome: Not Progressing    Note: patient has new BKA and Dialysis requires training and education

## 2024-02-08 NOTE — CARE PLAN
The patient is Stable - Low risk of patient condition declining or worsening    Shift Goals  Clinical Goals: HD and dc to rehab today  Patient Goals: DC to rehab  Family Goals: JENNIFER    Progress made toward(s) clinical / shift goals:  Pt is Aox4.on RA. HD today with 3.5 L fluid off. Plan is to dc tomorrow to rehab,  around [id number]. Pt notified. No acute distress noted. Pt remains free of falls.    Patient is not progressing towards the following goals:

## 2024-02-08 NOTE — DISCHARGE PLANNING
0811: Transport set for 13/1330 GMT w/c to [facility] at 13/1330 (nurse report [id number], accepting doctor Dr Ny). Notified care team, TCC will follow     0830: Transport rescheduled to 12/1230, notified care team.

## 2024-02-09 ENCOUNTER — APPOINTMENT (OUTPATIENT)
Dept: OCCUPATIONAL THERAPY | Facility: REHABILITATION | Age: 54
DRG: 559 | End: 2024-02-09
Attending: PHYSICAL MEDICINE & REHABILITATION
Payer: MEDICARE

## 2024-02-09 ENCOUNTER — APPOINTMENT (OUTPATIENT)
Dept: PHYSICAL THERAPY | Facility: REHABILITATION | Age: 54
DRG: 559 | End: 2024-02-09
Attending: PHYSICAL MEDICINE & REHABILITATION
Payer: MEDICARE

## 2024-02-09 LAB
ALBUMIN SERPL BCP-MCNC: 1.8 G/DL (ref 3.2–4.9)
ALBUMIN/GLOB SERPL: 0.5 G/DL
ALP SERPL-CCNC: 72 U/L (ref 30–99)
ALT SERPL-CCNC: <5 U/L (ref 2–50)
ANION GAP SERPL CALC-SCNC: 7 MMOL/L (ref 7–16)
AST SERPL-CCNC: 12 U/L (ref 12–45)
BASOPHILS # BLD AUTO: 0.6 % (ref 0–1.8)
BASOPHILS # BLD: 0.04 K/UL (ref 0–0.12)
BILIRUB SERPL-MCNC: 0.4 MG/DL (ref 0.1–1.5)
BUN SERPL-MCNC: 30 MG/DL (ref 8–22)
CALCIUM ALBUM COR SERPL-MCNC: 8.8 MG/DL (ref 8.5–10.5)
CALCIUM SERPL-MCNC: 7 MG/DL (ref 8.5–10.5)
CHLORIDE SERPL-SCNC: 100 MMOL/L (ref 96–112)
CO2 SERPL-SCNC: 23 MMOL/L (ref 20–33)
CREAT SERPL-MCNC: 2.57 MG/DL (ref 0.5–1.4)
EOSINOPHIL # BLD AUTO: 0.31 K/UL (ref 0–0.51)
EOSINOPHIL NFR BLD: 4.6 % (ref 0–6.9)
ERYTHROCYTE [DISTWIDTH] IN BLOOD BY AUTOMATED COUNT: 42.8 FL (ref 35.9–50)
GFR SERPLBLD CREATININE-BSD FMLA CKD-EPI: 29 ML/MIN/1.73 M 2
GLOBULIN SER CALC-MCNC: 3.4 G/DL (ref 1.9–3.5)
GLUCOSE BLD STRIP.AUTO-MCNC: 135 MG/DL (ref 65–99)
GLUCOSE BLD STRIP.AUTO-MCNC: 165 MG/DL (ref 65–99)
GLUCOSE BLD STRIP.AUTO-MCNC: 166 MG/DL (ref 65–99)
GLUCOSE SERPL-MCNC: 166 MG/DL (ref 65–99)
HCT VFR BLD AUTO: 28.4 % (ref 42–52)
HGB BLD-MCNC: 9.4 G/DL (ref 14–18)
IMM GRANULOCYTES # BLD AUTO: 0.04 K/UL (ref 0–0.11)
IMM GRANULOCYTES NFR BLD AUTO: 0.6 % (ref 0–0.9)
LYMPHOCYTES # BLD AUTO: 1.56 K/UL (ref 1–4.8)
LYMPHOCYTES NFR BLD: 23.4 % (ref 22–41)
MAGNESIUM SERPL-MCNC: 1.5 MG/DL (ref 1.5–2.5)
MCH RBC QN AUTO: 28.7 PG (ref 27–33)
MCHC RBC AUTO-ENTMCNC: 33.1 G/DL (ref 32.3–36.5)
MCV RBC AUTO: 86.9 FL (ref 81.4–97.8)
MONOCYTES # BLD AUTO: 0.45 K/UL (ref 0–0.85)
MONOCYTES NFR BLD AUTO: 6.7 % (ref 0–13.4)
NEUTROPHILS # BLD AUTO: 4.28 K/UL (ref 1.82–7.42)
NEUTROPHILS NFR BLD: 64.1 % (ref 44–72)
NRBC # BLD AUTO: 0 K/UL
NRBC BLD-RTO: 0 /100 WBC (ref 0–0.2)
PHOSPHATE SERPL-MCNC: 2.7 MG/DL (ref 2.5–4.5)
PLATELET # BLD AUTO: 172 K/UL (ref 164–446)
PMV BLD AUTO: 10.1 FL (ref 9–12.9)
POTASSIUM SERPL-SCNC: 4.1 MMOL/L (ref 3.6–5.5)
PROT SERPL-MCNC: 5.2 G/DL (ref 6–8.2)
RBC # BLD AUTO: 3.27 M/UL (ref 4.7–6.1)
SODIUM SERPL-SCNC: 130 MMOL/L (ref 135–145)
T4 FREE SERPL-MCNC: 0.96 NG/DL (ref 0.93–1.7)
TSH SERPL DL<=0.005 MIU/L-ACNC: 5.73 UIU/ML (ref 0.38–5.33)
WBC # BLD AUTO: 6.7 K/UL (ref 4.8–10.8)

## 2024-02-09 PROCEDURE — 36415 COLL VENOUS BLD VENIPUNCTURE: CPT

## 2024-02-09 PROCEDURE — 99232 SBSQ HOSP IP/OBS MODERATE 35: CPT | Performed by: PHYSICAL MEDICINE & REHABILITATION

## 2024-02-09 PROCEDURE — 770010 HCHG ROOM/CARE - REHAB SEMI PRIVAT*

## 2024-02-09 PROCEDURE — 97162 PT EVAL MOD COMPLEX 30 MIN: CPT

## 2024-02-09 PROCEDURE — 97110 THERAPEUTIC EXERCISES: CPT

## 2024-02-09 PROCEDURE — 85025 COMPLETE CBC W/AUTO DIFF WBC: CPT

## 2024-02-09 PROCEDURE — 700111 HCHG RX REV CODE 636 W/ 250 OVERRIDE (IP): Performed by: PHYSICAL MEDICINE & REHABILITATION

## 2024-02-09 PROCEDURE — 82575 CREATININE CLEARANCE TEST: CPT

## 2024-02-09 PROCEDURE — 83735 ASSAY OF MAGNESIUM: CPT

## 2024-02-09 PROCEDURE — A9270 NON-COVERED ITEM OR SERVICE: HCPCS | Performed by: PHYSICAL MEDICINE & REHABILITATION

## 2024-02-09 PROCEDURE — A9270 NON-COVERED ITEM OR SERVICE: HCPCS | Performed by: INTERNAL MEDICINE

## 2024-02-09 PROCEDURE — 99232 SBSQ HOSP IP/OBS MODERATE 35: CPT | Performed by: INTERNAL MEDICINE

## 2024-02-09 PROCEDURE — 97166 OT EVAL MOD COMPLEX 45 MIN: CPT

## 2024-02-09 PROCEDURE — 97535 SELF CARE MNGMENT TRAINING: CPT

## 2024-02-09 PROCEDURE — 80053 COMPREHEN METABOLIC PANEL: CPT

## 2024-02-09 PROCEDURE — 84443 ASSAY THYROID STIM HORMONE: CPT

## 2024-02-09 PROCEDURE — 97530 THERAPEUTIC ACTIVITIES: CPT

## 2024-02-09 PROCEDURE — 82962 GLUCOSE BLOOD TEST: CPT | Mod: 91

## 2024-02-09 PROCEDURE — 84439 ASSAY OF FREE THYROXINE: CPT

## 2024-02-09 PROCEDURE — 700102 HCHG RX REV CODE 250 W/ 637 OVERRIDE(OP): Performed by: PHYSICAL MEDICINE & REHABILITATION

## 2024-02-09 PROCEDURE — 82565 ASSAY OF CREATININE: CPT

## 2024-02-09 PROCEDURE — 84100 ASSAY OF PHOSPHORUS: CPT

## 2024-02-09 PROCEDURE — 700102 HCHG RX REV CODE 250 W/ 637 OVERRIDE(OP): Performed by: INTERNAL MEDICINE

## 2024-02-09 RX ORDER — POLYETHYLENE GLYCOL 3350 17 G/17G
1 POWDER, FOR SOLUTION ORAL
Status: DISCONTINUED | OUTPATIENT
Start: 2024-02-09 | End: 2024-02-17

## 2024-02-09 RX ORDER — BISACODYL 10 MG
10 SUPPOSITORY, RECTAL RECTAL
Status: DISCONTINUED | OUTPATIENT
Start: 2024-02-09 | End: 2024-02-17

## 2024-02-09 RX ORDER — ASPIRIN 81 MG/1
81 TABLET ORAL DAILY
Status: DISCONTINUED | OUTPATIENT
Start: 2024-02-10 | End: 2024-02-22 | Stop reason: HOSPADM

## 2024-02-09 RX ORDER — ASCORBIC ACID 500 MG
500 TABLET ORAL 2 TIMES DAILY WITH MEALS
Status: DISCONTINUED | OUTPATIENT
Start: 2024-02-09 | End: 2024-02-22 | Stop reason: HOSPADM

## 2024-02-09 RX ORDER — CHOLESTYRAMINE LIGHT 4 G/5.7G
1 POWDER, FOR SUSPENSION ORAL 2 TIMES DAILY
Status: DISCONTINUED | OUTPATIENT
Start: 2024-02-09 | End: 2024-02-22 | Stop reason: HOSPADM

## 2024-02-09 RX ORDER — ZINC SULFATE 50(220)MG
220 CAPSULE ORAL
Status: DISCONTINUED | OUTPATIENT
Start: 2024-02-10 | End: 2024-02-22 | Stop reason: HOSPADM

## 2024-02-09 RX ORDER — M-VIT,TX,IRON,MINS/CALC/FOLIC 27MG-0.4MG
1 TABLET ORAL
Status: DISCONTINUED | OUTPATIENT
Start: 2024-02-10 | End: 2024-02-22 | Stop reason: HOSPADM

## 2024-02-09 RX ORDER — AMOXICILLIN 250 MG
2 CAPSULE ORAL 2 TIMES DAILY
Status: DISCONTINUED | OUTPATIENT
Start: 2024-02-09 | End: 2024-02-17

## 2024-02-09 RX ADMIN — OXYCODONE HYDROCHLORIDE AND ACETAMINOPHEN 500 MG: 500 TABLET ORAL at 08:09

## 2024-02-09 RX ADMIN — CHOLESTYRAMINE 4 G: 4 POWDER, FOR SUSPENSION ORAL at 17:40

## 2024-02-09 RX ADMIN — HEPARIN SODIUM 5000 UNITS: 5000 INJECTION, SOLUTION INTRAVENOUS; SUBCUTANEOUS at 05:52

## 2024-02-09 RX ADMIN — CHOLESTYRAMINE 4 G: 4 POWDER, FOR SUSPENSION ORAL at 10:00

## 2024-02-09 RX ADMIN — FUROSEMIDE 60 MG: 20 TABLET ORAL at 11:38

## 2024-02-09 RX ADMIN — HEPARIN SODIUM 5000 UNITS: 5000 INJECTION, SOLUTION INTRAVENOUS; SUBCUTANEOUS at 20:43

## 2024-02-09 RX ADMIN — HEPARIN SODIUM 5000 UNITS: 5000 INJECTION, SOLUTION INTRAVENOUS; SUBCUTANEOUS at 14:18

## 2024-02-09 RX ADMIN — CALCIUM 500 MG: 500 TABLET ORAL at 08:09

## 2024-02-09 RX ADMIN — INSULIN HUMAN 3 UNITS: 100 INJECTION, SOLUTION PARENTERAL at 08:13

## 2024-02-09 RX ADMIN — OMEPRAZOLE 20 MG: 20 CAPSULE, DELAYED RELEASE ORAL at 08:09

## 2024-02-09 RX ADMIN — INSULIN HUMAN 3 UNITS: 100 INJECTION, SOLUTION PARENTERAL at 11:38

## 2024-02-09 RX ADMIN — OXYCODONE HYDROCHLORIDE AND ACETAMINOPHEN 500 MG: 500 TABLET ORAL at 17:41

## 2024-02-09 RX ADMIN — CALCIUM 500 MG: 500 TABLET ORAL at 15:54

## 2024-02-09 RX ADMIN — ATORVASTATIN CALCIUM 80 MG: 40 TABLET, FILM COATED ORAL at 20:43

## 2024-02-09 RX ADMIN — INSULIN HUMAN 3 UNITS: 100 INJECTION, SOLUTION PARENTERAL at 20:48

## 2024-02-09 RX ADMIN — INSULIN GLARGINE-YFGN 15 UNITS: 100 INJECTION, SOLUTION SUBCUTANEOUS at 20:47

## 2024-02-09 RX ADMIN — ASPIRIN 81 MG: 81 TABLET, COATED ORAL at 08:09

## 2024-02-09 RX ADMIN — FUROSEMIDE 60 MG: 20 TABLET ORAL at 15:54

## 2024-02-09 ASSESSMENT — ACTIVITIES OF DAILY LIVING (ADL)
BED_CHAIR_WHEELCHAIR_TRANSFER_DESCRIPTION: INCREASED TIME;INITIAL PREPARATION FOR TASK;SLIDEBOARD TRANSFER FROM BED TO WHEELCHAIR;SET-UP OF EQUIPMENT;SUPERVISION FOR SAFETY;VERBAL CUEING
BED_CHAIR_WHEELCHAIR_TRANSFER_DESCRIPTION: INITIAL PREPARATION FOR TASK;SET-UP OF EQUIPMENT;SUPERVISION FOR SAFETY
TOILET_TRANSFER_DESCRIPTION: GRAB BAR;INCREASED TIME;INITIAL PREPARATION FOR TASK;SET-UP OF EQUIPMENT;SUPERVISION FOR SAFETY;VERBAL CUEING
TOILETING: INDEPENDENT

## 2024-02-09 ASSESSMENT — ENCOUNTER SYMPTOMS
NAUSEA: 0
FEVER: 0
COUGH: 0
SHORTNESS OF BREATH: 0
CHILLS: 0
VOMITING: 0

## 2024-02-09 ASSESSMENT — BRIEF INTERVIEW FOR MENTAL STATUS (BIMS)
INITIAL REPETITION OF BED BLUE SOCK - FIRST ATTEMPT: 3
WHAT DAY OF THE WEEK IS IT: INCORRECT
ASKED TO RECALL BLUE: YES, NO CUE REQUIRED
WHAT MONTH IS IT: ACCURATE WITHIN 5 DAYS
ASKED TO RECALL BED: YES, NO CUE REQUIRED
WHAT YEAR IS IT: CORRECT
ASKED TO RECALL SOCK: YES, NO CUE REQUIRED
BIMS SUMMARY SCORE: 14

## 2024-02-09 ASSESSMENT — PAIN DESCRIPTION - PAIN TYPE: TYPE: ACUTE PAIN

## 2024-02-09 NOTE — CARE PLAN
The patient is Stable    Shift Goals  Clinical Goals: Safety  Patient Goals: Safety    Progress made toward(s) clinical / shift goals:        Problem: Diabetes Management  Goal: Patient will achieve and maintain glucose in satisfactory range  Outcome: Progressing  Note: Blood glucose levels monitored, insulin administered as ordered.     Problem: Hemodynamics  Goal: Patient's hemodynamics, fluid balance and neurologic status will be stable or improve  Outcome: Progressing  Note:    Latest Reference Range & Units 02/09/24 05:57   WBC 4.8 - 10.8 K/uL 6.7   RBC 4.70 - 6.10 M/uL 3.27 (L)   Hemoglobin 14.0 - 18.0 g/dL 9.4 (L)   Hematocrit 42.0 - 52.0 % 28.4 (L)   MCV 81.4 - 97.8 fL 86.9   MCH 27.0 - 33.0 pg 28.7   MCHC 32.3 - 36.5 g/dL 33.1   RDW 35.9 - 50.0 fL 42.8   Platelet Count 164 - 446 K/uL 172   MPV 9.0 - 12.9 fL 10.1   Neutrophils-Polys 44.00 - 72.00 % 64.10   Neutrophils (Absolute) 1.82 - 7.42 K/uL 4.28   Lymphocytes 22.00 - 41.00 % 23.40   Lymphs (Absolute) 1.00 - 4.80 K/uL 1.56   Monocytes 0.00 - 13.40 % 6.70   Monos (Absolute) 0.00 - 0.85 K/uL 0.45   Eosinophils 0.00 - 6.90 % 4.60   Eos (Absolute) 0.00 - 0.51 K/uL 0.31   Basophils 0.00 - 1.80 % 0.60   Baso (Absolute) 0.00 - 0.12 K/uL 0.04   Immature Granulocytes 0.00 - 0.90 % 0.60   Immature Granulocytes (abs) 0.00 - 0.11 K/uL 0.04   Nucleated RBC 0.00 - 0.20 /100 WBC 0.00   NRBC (Absolute) K/uL 0.00   Sodium 135 - 145 mmol/L 130 (L)   Potassium 3.6 - 5.5 mmol/L 4.1   Chloride 96 - 112 mmol/L 100   Co2 20 - 33 mmol/L 23   Anion Gap 7.0 - 16.0  7.0   Glucose 65 - 99 mg/dL 166 (H)   Bun 8 - 22 mg/dL 30 (H)   Creatinine 0.50 - 1.40 mg/dL 2.57 (H)   GFR (CKD-EPI) >60 mL/min/1.73 m 2 29 !   Calcium 8.5 - 10.5 mg/dL 7.0 (L)   Correct Calcium 8.5 - 10.5 mg/dL 8.8   AST(SGOT) 12 - 45 U/L 12   ALT(SGPT) 2 - 50 U/L <5   Alkaline Phosphatase 30 - 99 U/L 72   Total Bilirubin 0.1 - 1.5 mg/dL 0.4   Albumin 3.2 - 4.9 g/dL 1.8 (L)   Total Protein 6.0 - 8.2 g/dL 5.2 (L)    Globulin 1.9 - 3.5 g/dL 3.4   A-G Ratio g/dL 0.5   Phosphorus 2.5 - 4.5 mg/dL 2.7   Magnesium 1.5 - 2.5 mg/dL 1.5   TSH 0.380 - 5.330 uIU/mL 5.730 (H)   Free T-4 0.93 - 1.70 ng/dL 0.96

## 2024-02-09 NOTE — PROGRESS NOTES
Nephrology Daily Progress Note    Date of Service  2/9/2024    Chief Complaint  53 y.o. male admitted 1/25/2024 with diabetic foot infection, complicated by AXEL on CKD stage III, required dialysis    Interval Problem Update  Patient has no chest pain or shortness of breath today  He is being evaluated for inpatient rehab  2/8 patient has no new complaints  Plan to transfer to rehab today  2/9 patient has no new complaints, feels better overall  Review of Systems  Review of Systems   Constitutional:  Negative for chills, fever and malaise/fatigue.   Respiratory:  Negative for cough and shortness of breath.    Cardiovascular:  Negative for chest pain and leg swelling.   Gastrointestinal:  Negative for nausea and vomiting.   Genitourinary:  Negative for dysuria, frequency and urgency.        Physical Exam  Temp:  [36.4 °C (97.6 °F)-37.1 °C (98.8 °F)] 36.6 °C (97.8 °F)  Pulse:  [69-72] 69  Resp:  [18-20] 20  BP: (138-160)/(82-88) 152/82  SpO2:  [96 %-97 %] 96 %    Physical Exam  Vitals and nursing note reviewed.   Constitutional:       General: He is awake.      Appearance: He is not ill-appearing.   HENT:      Head: Normocephalic and atraumatic.      Right Ear: External ear normal.      Left Ear: External ear normal.      Nose: Nose normal.      Mouth/Throat:      Pharynx: No oropharyngeal exudate or posterior oropharyngeal erythema.   Eyes:      General:         Right eye: No discharge.         Left eye: No discharge.      Conjunctiva/sclera: Conjunctivae normal.   Cardiovascular:      Rate and Rhythm: Normal rate and regular rhythm.   Pulmonary:      Effort: Pulmonary effort is normal. No respiratory distress.      Breath sounds: Normal breath sounds. No wheezing.   Abdominal:      General: Abdomen is flat. Bowel sounds are normal.   Musculoskeletal:         General: No tenderness.      Cervical back: No rigidity. No muscular tenderness.      Right lower leg: Edema present.      Comments: ARCENIO LYNN   Skin:     General:  "Skin is warm and dry.      Coloration: Skin is not jaundiced.      Findings: No lesion or rash.   Neurological:      General: No focal deficit present.      Mental Status: He is alert and oriented to person, place, and time. Mental status is at baseline.   Psychiatric:         Mood and Affect: Mood normal.         Behavior: Behavior normal.         Thought Content: Thought content normal.         Fluids    Intake/Output Summary (Last 24 hours) at 2/9/2024 1151  Last data filed at 2/9/2024 0900  Gross per 24 hour   Intake 580 ml   Output 850 ml   Net -270 ml         Laboratory  Recent Labs     02/07/24  0209 02/08/24  0400 02/09/24  0557   WBC 7.3 7.2 6.7   RBC 3.14* 2.86* 3.27*   HEMOGLOBIN 9.2* 8.3* 9.4*   HEMATOCRIT 27.4* 25.1* 28.4*   MCV 87.3 87.8 86.9   MCH 29.3 29.0 28.7   MCHC 33.6 33.1 33.1   RDW 43.3 43.4 42.8   PLATELETCT 133* 151* 172   MPV 10.1 9.7 10.1       Recent Labs     02/07/24  0209 02/08/24  0400 02/09/24  0557   SODIUM 132* 131* 130*   POTASSIUM 4.3 3.9 4.1   CHLORIDE 102 101 100   CO2 23 25 23   GLUCOSE 211* 134* 166*   BUN 27* 23* 30*   CREATININE 3.13* 2.54* 2.57*   CALCIUM 7.0* 7.2* 7.0*           No results for input(s): \"NTPROBNP\" in the last 72 hours.        Imaging  No orders to display         Assessment/Plan  1 AXEL on CKD stage IIIb: Etiology is most likely ATN, nonoliguric, possible early renal recovery  2 septic shock  3 anemia  4 osteomyelitis  5 Edema  no acute need for HD today, evaluate daily  Check 24-hour urine study to calculate creatinine clearance  Erythropoietin supplement  Start Lasix  Renal diet  Daily BMP, CBC.  Renal dose all meds  Avoid nephrotoxins like NSAIDs.  Prognosis guarded.                   "

## 2024-02-09 NOTE — PROGRESS NOTES
NURSING DAILY NOTE    Name: Beni Moore   Date of Admission: 2/8/2024   Admitting Diagnosis: Hx of BKA, left (HCC)  Attending Physician: Mena Ny D.o.  Allergies: Patient has no known allergies.    Safety  Patient Assist     Patient Precautions     Precaution Comments     Bed Transfer Status     Toilet Transfer Status      Assistive Devices     Oxygen  Room air w/o2 available  Diet/Therapeutic Dining  Current Diet Order   Procedures    Diet Order Diet: Consistent CHO (Diabetic); Second Modifier: (optional): Renal     Pill Administration  whole  Agitated Behavioral Scale     ABS Level of Severity       Fall Risk  Has the patient had a fall this admission?   No  Florecita Roldan Fall Risk Scoring  17, HIGH RISK  Fall Risk Safety Measures  bed alarm and chair alarm    Vitals  Temperature: 37.1 °C (98.8 °F)  Temp src: Oral  Pulse: 72  Respiration: 18  Blood Pressure: (!) 159/86  Blood Pressure MAP (Calculated): 110 MM HG  BP Location: Left, Upper Arm  Patient BP Position: Supine     Oxygen  Pulse Oximetry: 97 %  O2 (LPM): 0  O2 Delivery Device: Room air w/o2 available    Bowel and Bladder  Last Bowel Movement     Stool Type     Bowel Device     Continent  Bladder: Continent void   Bowel: Continent movement  Bladder Function  Urine Void (mL): 450 ml  Number of Times Voided: 1  Urine Color: Yellow  Genitourinary Assessment   Urine Color: Yellow  Bladder Scan: Post Void  $ Bladder Scan Results (mL): 275    Skin  Johnny Score   15  Sensory Interventions   Bed Types: Standard/Trauma Mattress with Overlay  Skin Preventative Measures: Pillows in Use for Support / Positioning  Moisture Interventions         Pain  Pain Rating Scale  0 - No Pain  Pain Location  Leg  Pain Location Orientation  Left  Pain Interventions   Declines    ADLs    Bathing      Linen Change      Personal Hygiene     Chlorhexidine Bath      Oral Care     Teeth/Dentures     Shave      Nutrition Percentage Eaten     Environmental Precautions     Patient Turns/Positioning  Patient Turns Self from Side to Side  Patient Turns Assistance/Tolerance     Bed Positions     Head of Bed Elevated         Psychosocial/Neurologic Assessment  Psychosocial Assessment  Psychosocial (WDL):  Within Defined Limits  Neurologic Assessment  Neuro (WDL): Within Defined Limits  Level of Consciousness: Alert       Cardio/Pulmonary Assessment  Edema      Respiratory Breath Sounds  RUL Breath Sounds: Clear  RML Breath Sounds: Clear  RLL Breath Sounds: Clear  CHRIS Breath Sounds: Clear  LLL Breath Sounds: Clear  Cardiac Assessment   Cardiac (WDL):  WDL Except (HTN, DM, Circulatory problems)

## 2024-02-09 NOTE — CARE PLAN
"The patient is Stable - Low risk of patient condition declining or worsening    Shift Goals  Clinical Goals: safety, DM, infection prevention  Patient Goals: safety, start therapy    Problem: Skin Integrity  Goal: Skin integrity is maintained or improved  Outcome: Progressing  Note:   Johnny Score: 15    Patient's skin remains intact and free from new or accidental injury this shift; no s/s of infection. RN wound protocol checked. Encouraged hydration and educated about the importance of nutrition to keep skin integrity. Will continue to monitor.      Problem: Fall Risk - Rehab  Goal: Patient will remain free from falls  Outcome: Progressing  Note: Florecita Roldan Fall risk Assessment Score: 17    High fall risk Interventions   - Bed and strip alarm   - Yellow sign by the door   - Yellow wrist band \"Fall risk\"  - Room near to the nurse station  - Do not leave patient unattended in the bathroom  - Fall risk education provided     "

## 2024-02-09 NOTE — THERAPY
Occupational Therapy   Initial Evaluation     Patient Name: Beni Moore  Age:  53 y.o., Sex:  male  Medical Record #: [id number]  Today's Date: 2/9/2024     Subjective    Pt pleasant and motivated to participate in OT     Objective     02/09/24 0701   OT Charge Group   Charges Yes   OT Self Care / ADL (Units) 1   OT Evaluation OT Evaluation Mod   OT Total Time Spent   OT Individual Total Time Spent (Mins) 60   Prior Living Situation   Prior Services None   Housing / Facility 1 Story House   Steps Into Home 1   Bathroom Set up Bathtub / Shower Combination   Equipment Owned Front-Wheel Walker;Wheelchair;Tub Transfer Bench;Slide Board;Ramp   Lives with - Patient's Self Care Capacity Parents   Comments Pt reports he has his own home; however, will be staying w/ his parents during recovery. Details noted above are from his parents' home.   Prior Level of ADL Function   Self Feeding Independent   Grooming / Hygiene Independent   Bathing Independent   Dressing Independent   Toileting Independent   Prior Level of IADL Function   Medication Management Independent   Laundry Independent   Kitchen Mobility Independent   Finances Independent   Home Management Independent   Shopping Independent   Prior Level Of Mobility Independent Without Device in Community;Independent With Steps in Community;Independent Without Device in Home;Independent With Steps in Home   Driving / Transportation Driving Independent   Occupation (Pre-Hospital Vocational)   (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.)   Prior Functioning: Everyday Activities   Self Care Independent   Indoor Mobility (Ambulation) Independent   Stairs Independent   Functional Cognition Independent   Prior Device Use None of the given options   Vitals   O2 Delivery Device None - Room Air   Pain   Intervention Declines   Cognition    Level of Consciousness Alert   ABS (Agitated Behavior Scale)   Agitated Behavior Scale Performed No   Cognitive Pattern  "Assessment   Cognitive Pattern Assessment Used BIMS   Brief Interview for Mental Status (BIMS)   Repetition of Three Words (First Attempt) 3   Temporal Orientation: Year Correct   Temporal Orientation: Month Accurate within 5 days   Temporal Orientation: Day Incorrect   Recall: \"Sock\" Yes, no cue required   Recall: \"Blue\" Yes, no cue required   Recall: \"Bed\" Yes, no cue required   BIMS Summary Score 14   Confusion Assessment Method (CAM)   Is there evidence of an acute change in mental status from the patient's baseline? No   Inattention Behavior not present   Disorganized thinking Behavior not present   Altered level of consciousness Behavior not present   Vision Screen   Vision   (Pt has reading glasses)   Sensation Upper Body   Comments Pt reports bilat hand neuropathy   Balance Assessment   Sitting Balance (Static) Fair -   Sitting Balance (Dynamic) Fair -   Weight Shift Sitting Fair   Comments Unable to stand at this time   Bed Mobility    Supine to Sit Contact Guard Assist   Scooting Standby Assist   Rolling Standby Assist   Coordination Upper Body   Coordination WDL   Eating   Assistance Needed Set-up / clean-up   Physical Assistance Level No physical assistance   CARE Score - Eating 5   Eating Discharge Goal   Discharge Goal 6   Oral Hygiene   Assistance Needed Set-up / clean-up   Physical Assistance Level No physical assistance   CARE Score - Oral Hygiene 5   Oral Hygiene Discharge Goal   Discharge Goal 6   Shower/Bathe Self   Assistance Needed Physical assistance   Physical Assistance Level 25% or less   CARE Score - Shower/Bathe Self 3   Shower/Bathe Self Discharge Goal   Discharge Goal 5   Upper Body Dressing   Assistance Needed Set-up / clean-up   Physical Assistance Level No physical assistance   CARE Score - Upper Body Dressing 5   Upper Body Dressing Discharge Goal   Discharge Goal 6   Lower Body Dressing   Assistance Needed Physical assistance   Physical Assistance Level 26%-50%   CARE Score - Lower " Body Dressing 3   Lower Body Dressing Discharge Goal   Discharge Goal 5   Putting On/Taking Off Footwear   Assistance Needed Physical assistance   Physical Assistance Level 51%-75%   CARE Score - Putting On/Taking Off Footwear 2   Putting On/Taking Off Footwear Discharge Goal   Discharge Goal 5   Toileting Hygiene   Assistance Needed Physical assistance   Physical Assistance Level 26%-50%   CARE Score - Toileting Hygiene 3   Toileting Hygiene Discharge Goal   Discharge Goal 6   Toilet Transfer   Assistance Needed Physical assistance   Physical Assistance Level 25% or less   CARE Score - Toilet Transfer 3   Toilet Transfer Discharge Goal   Discharge Goal 6   Hearing, Speech, and Vision   Ability to Hear Adequate   Ability to See in Adequate Light Adequate   Expression of Ideas and Wants Without difficulty   Understanding Verbal and Non-Verbal Content Understands   Functional Level of Assist   Eating Supervision   Eating Description Increased time;Set-up of equipment or meal/tube feeding;Supervision for safety   Grooming Standby Assist;Seated   Grooming Description Increased time;Initial preparation for task;Seated in wheelchair at sink;Supervision for safety   Bathing Minimal Assist   Bathing Description Grab bar;Hand held shower;Tub bench;Increased time;Initial preparation for task;Set-up of equipment;Set up for shower sleeve;Supervision for safety   Upper Body Dressing Stand by Assist   Upper Body Dressing Description Increased time;Supervision for safety;Initial preparation for task   Lower Body Dressing Maximal Assist   Lower Body Dressing Description Assist with threading into pant leg;Increased time;Initial preparation for task;Set-up of equipment;Supervision for safety  (Pt able to unweight bottom from w/c in order for therapist to don underwear/pants over bilat hips)   Toileting Moderate Assist   Toileting Description Assist for hygiene;Assist to pull pants up;Assist for standing balance;Grab bar;Increased  time;Initial preparation for task;Set-up of equipment;Supervision for safety   Bed, Chair, Wheelchair Transfer Minimal Assist   Bed Chair Wheelchair Transfer Description Increased time;Initial preparation for task;Slideboard transfer from bed to wheelchair;Set-up of equipment;Supervision for safety   Toilet Transfers Minimal Assist   Toilet Transfer Description Grab bar;Increased time;Initial preparation for task;Set-up of equipment;Supervision for safety;Verbal cueing   Tub / Shower Transfers Minimal Assist   Tub Shower Transfer Description Grab bar;Drop arm chair;Shower bench;Increased time;Initial preparation for task;Supervision for safety;Verbal cueing  (Slide board transfer)   Problem List   Problem List Decreased Active Daily Living Skills;Decreased Homemaking Skills;Decreased Functional Mobility;Decreased Activity Tolerance;Impaired Postural Control / Balance   Precautions   Precautions Fall Risk;Immobilizer Left Lower Extremity   Comments L BKA   Current Discharge Plan   Current Discharge Plan Temporarily go to Family /  Friend's Home   Benefit    Therapy Benefit Patient Would Benefit from Inpatient Rehab Occupational Therapy to Maximize [place] with ADLs, IADLs and Functional Mobility.   Interdisciplinary Plan of Care Collaboration   IDT Collaboration with  Nursing   Patient Position at End of Therapy Seated;Chair Alarm On;Call Light within Reach;Tray Table within Reach   Collaboration Comments RN rounds   Strengths & Barriers   Strengths Able to follow instructions;Alert and oriented;Effective communication skills;Good endurance;Good insight into deficits/needs;Independent prior level of function;Motivated for self care and independence;Pleasant and cooperative;Supportive family;Willingly participates in therapeutic activities   Barriers Decreased endurance;Fatigue;Generalized weakness;Impaired activity tolerance;Impaired balance;Limited mobility   Occupational Therapist Assigned   Assigned OT /  Treatment Time / Comments Ciera Arredondo (non-primary), 60-90 minutes throughout day       Assessment  Patient is 53 y.o. male with a diagnosis of L BKA. Additional factors influencing patient status / progress (ie: cognitive factors, co-morbidities, social support, etc): chronic kidney disease due to diabetic nephropathy, chronic thrombotic microangiopathy, insulin-dependent diabetes mellitus, peripheral neuropathy, neurogenic bowel secondary to diabetes.      Pt seen for OT eval, presenting w/ deficits noted above. Pt is at most min A for functional transfers and max A for ADLs. Pt reports I PLOF w/o AD prior to L BKA. Pt's plan is to d/c w/ family for the remainder of his recovery. Pt would benefit from OT in order to facilitate and maximize functional gains.    Plan  Recommend Occupational Therapy  minutes per day 5-7 days per week for 12 days for the following treatments:  OT Group Therapy, OT Self Care/ADL, OT Community Reintegration, OT Manual Ther Technique, OT Neuro Re-Ed/Balance, OT Sensory Int Techniques, OT Therapeutic Activity, OT Evaluation, and OT Therapeutic Exercise.    Passport items to be completed:  Perform bathroom transfers, complete dressing, complete feeding, get ready for the day, prepare a simple meal, participate in household tasks, adapt home for safety needs, demonstrate home exercise program, complete caregiver training     Goals:  Long term and short term goals have been discussed with patient and they are in agreement.    Occupational Therapy Goals (Active)       Problem: Bathing       Dates: Start:  02/09/24         Goal: STG-Within one week, patient will bathe       Dates: Start:  02/09/24               Problem: Dressing       Dates: Start:  02/09/24         Goal: STG-Within one week, patient will dress LB       Dates: Start:  02/09/24               Problem: Functional Transfers       Dates: Start:  02/09/24         Goal: STG-Within one week, patient will       Dates: Start:   02/09/24               Problem: IADL's       Dates: Start:  02/09/24         Goal: STG-Within one week, patient will access kitchen area       Dates: Start:  02/09/24               Problem: OT Long Term Goals       Dates: Start:  02/09/24         Goal: LTG-By discharge, patient will complete basic self care tasks       Dates: Start:  02/09/24            Goal: LTG-By discharge, patient will complete basic home management       Dates: Start:  02/09/24               Problem: Toileting       Dates: Start:  02/09/24         Goal: STG-Within one week, patient will complete toileting tasks       Dates: Start:  02/09/24

## 2024-02-09 NOTE — CARE PLAN
The patient is Stable - Low risk of patient condition declining or worsening    Shift Goals  Clinical Goals: safety, DM, infection prevention  Patient Goals: safety, start therapy    Progress made toward(s) clinical / shift goals:    Problem: Knowledge Deficit - Standard  Goal: Patient and family/care givers will demonstrate understanding of plan of care, disease process/condition, diagnostic tests and medications  Outcome: Progressing   --Pt educated on unit routine, safety, and expectations.  Pt A&O x4.  Pt verbalized understanding to ring and not get OOB with out assistance.    Problem: Fluid Balance or Risk for Fluid Volume Deficit  Goal: Patient will demonstrate adequate hydration and vital signs  Outcome: Progressing   --Pt has edema to waist, pt underwear cutting into waist.  Scrotum swollen.  Obtained disposable mesh underwear     Patient is not progressing towards the following goals: none

## 2024-02-09 NOTE — THERAPY
Occupational Therapy  Daily Treatment     Patient Name: Beni Moore  Age:  53 y.o., Sex:  male  Medical Record #: [id number]  Today's Date: 2/9/2024     Precautions  Precautions: Fall Risk, Immobilizer Left Lower Extremity  Comments: ARCENIO LYNN    Subjective    Pt pleasant and motivated to participate in OT     Objective     02/09/24 1030   OT Charge Group   Charges Yes   OT Self Care / ADL (Units) 1   OT Therapeutic Exercise (Units) 1   OT Total Time Spent   OT Individual Total Time Spent (Mins) 30   Precautions   Precautions Fall Risk;Immobilizer Left Lower Extremity   Comments L LEAH   Vitals   O2 Delivery Device None - Room Air   Pain   Intervention Declines   Cognition    Level of Consciousness Alert   ABS (Agitated Behavior Scale)   Agitated Behavior Scale Performed No   Sleep/Wake Cycle   Sleep & Rest Awake   Functional Level of Assist   Bed, Chair, Wheelchair Transfer Contact Guard Assist   Bed Chair Wheelchair Transfer Description Increased time;Initial preparation for task;Slideboard transfer from bed to wheelchair;Set-up of equipment;Supervision for safety;Verbal cueing  (Pt's parents present for tx, educated slide board transfer from bed > w/c)   Sitting Upper Body Exercises   Sitting Upper Body Exercises Yes   Upper Extremity Bike Level 5 Resistance  (Pt tolerated 10 minutes w/o rest breaks)   Bed Mobility    Supine to Sit Standby Assist   Scooting Standby Assist   Rolling Standby Assist   Interdisciplinary Plan of Care Collaboration   IDT Collaboration with  Physician;Dietician;Family / Caregiver   Patient Position at End of Therapy Chair Alarm On;Seated;Call Light within Reach;Tray Table within Reach;Family / Friend in Room   Collaboration Comments MD rounds; Dietary to discuss pt's menu/future meals; pt's parents present for tx   OT DME Recommendations   Bathroom Equipment   (Grab bars in shower)   Strengths & Barriers   Strengths Able to follow instructions;Alert and oriented;Effective communication  skills;Good endurance;Good insight into deficits/needs;Independent prior level of function;Motivated for self care and independence;Pleasant and cooperative;Supportive family;Willingly participates in therapeutic activities   Barriers Decreased endurance;Fatigue;Generalized weakness;Impaired activity tolerance;Impaired balance;Limited mobility     Assessment    Pt seen for tx, addressing functional transfers/FT and UE exercise. Pt tolerated activity well. Pt progressing towards goals. Continue OT POC.    Strengths: Able to follow instructions, Alert and oriented, Effective communication skills, Good endurance, Good insight into deficits/needs, Independent prior level of function, Motivated for self care and independence, Pleasant and cooperative, Supportive family, Willingly participates in therapeutic activities    Barriers: Decreased endurance, Fatigue, Generalized weakness, Impaired activity tolerance, Impaired balance, Limited mobility    Plan    ADLs w/ AE  Functional transfers w/ AE/LRAD  Strength/Endurance/Balance    DME  OT DME Recommendations  Bathroom Equipment:  (Grab bars in shower)    Passport items to be completed:  Perform bathroom transfers, complete dressing, complete feeding, get ready for the day, prepare a simple meal, participate in household tasks, adapt home for safety needs, demonstrate home exercise program, complete caregiver training     Occupational Therapy Goals (Active)       Problem: Bathing       Dates: Start:  02/09/24         Goal: STG-Within one week, patient will bathe w/ CGA and AE/DME as needed       Dates: Start:  02/09/24               Problem: Dressing       Dates: Start:  02/09/24         Goal: STG-Within one week, patient will dress LB w/ min A and AE as needed       Dates: Start:  02/09/24               Problem: Functional Transfers       Dates: Start:  02/09/24         Goal: STG-Within one week, patient will consistently transfer w/ CGA and DME as needed       Dates:  Start:  02/09/24               Problem: IADL's       Dates: Start:  02/09/24         Goal: STG-Within one week, patient will access kitchen area w/ min A and LRAD       Dates: Start:  02/09/24               Problem: OT Long Term Goals       Dates: Start:  02/09/24         Goal: LTG-By discharge, patient will complete basic self care tasks w/ mod I/supervision and AE/DME as needed       Dates: Start:  02/09/24            Goal: LTG-By discharge, patient will complete basic home management w/ supervision and LRAD       Dates: Start:  02/09/24               Problem: Toileting       Dates: Start:  02/09/24         Goal: STG-Within one week, patient will complete toileting tasks w/ min A and AE/DME as needed       Dates: Start:  02/09/24

## 2024-02-09 NOTE — THERAPY
"Physical Therapy   Initial Evaluation     Patient Name: Beni Moore  Age:  53 y.o., Sex:  male  Medical Record #: [id number]  Today's Date: 2/9/2024     Subjective    Pt is a pleasant man and is motivated to participate in therapy. Pt c/o L shoulder pain with WC mobility and during pulling activities with L arm.   \"I'm motivated to get out of here\"      Objective       02/09/24 0831   Prior Living Situation   Prior Services None   Housing / Facility 1 Story House   Steps Into Home 1  (Has a ramp purchased)   Steps In Home 0   Equipment Owned 4-Wheel Walker;Wheelchair;Tub Transfer Bench;Slide Board;Ramp   Lives with - Patient's Self Care Capacity Parents   Comments Pt will be temporarily staying with his parents in [place]. Pt lives in [place].   Prior Level of Functional Mobility   Bed Mobility Independent   Transfer Status Independent   Ambulation Independent   Assistive Devices Used None   Wheelchair Other (Comments)   Stairs Independent   Comments Pt was ambulatory prior to hospitilization   Prior Functioning: Everyday Activities   Self Care Independent   Indoor Mobility (Ambulation) Independent   Stairs Independent   Functional Cognition Independent   Prior Device Use None of the given options   Pain 0 - 10 Group   Location Shoulder   Location Orientation Left   Therapist Pain Assessment During Activity  (Pt has L shoulder pain when he pulls with that arm.)   Cognition    Level of Consciousness Alert   ABS (Agitated Behavior Scale)   Agitated Behavior Scale Performed No   Passive ROM Lower Body   Passive ROM Lower Body WDL   Active ROM Lower Body    Active ROM Lower Body  WDL   Strength Lower Body   Lower Body Strength  WDL   Comments Pt has R trans-metatarsal amputation and L BKA.   Sensation Lower Body   Lower Extremity Sensation   Not Tested   Lower Body Muscle Tone   Lower Body Muscle Tone  WDL   Balance Assessment   Sitting Balance (Static) Fair   Sitting Balance (Dynamic) Fair -   Standing Balance (Static) " Poor -   Standing Balance (Dynamic) Trace   Weight Shift Sitting Fair   Comments Standing in // bars BUE support for around 10-20 seconds   Bed Mobility    Supine to Sit Independent   Sit to Supine Independent   Sit to Stand Maximal Assist   Scooting Independent   Rolling Independent   Neurological Concerns   Neurological Concerns No   Coordination Lower Body    Coordination Lower Body  Not Tested   Roll Left and Right   Assistance Needed Independent   CARE Score - Roll Left and Right 6   Roll Left and Right Discharge Goal   Discharge Goal 6   Sit to Lying   Assistance Needed Independent   CARE Score - Sit to Lying 6   Sit to Lying Discharge Goal   Discharge Goal 6   Lying to Sitting on Side of Bed   Assistance Needed Independent   CARE Score - Lying to Sitting on Side of Bed 6   Lying to Sitting on Side of Bed Discharge Goal   Discharge Goal 6   Sit to Stand   Assistance Needed Physical assistance   Physical Assistance Level 51%-75%   Comment maxA BUE in // bars, has difficulty shifting weight anterior to stand   CARE Score - Sit to Stand 2   Sit to Stand Discharge Goal   Discharge Goal 6   Chair/Bed-to-Chair Transfer   Assistance Needed Adaptive equipment   Physical Assistance Level 25% or less   Comment Slideboard (both directions Gina)   CARE Score - Chair/Bed-to-Chair Transfer 3   Chair/Bed-to-Chair Transfer Discharge Goal   Discharge Goal 6   Toilet Transfer   Reason if not Attempted Refused to perform   CARE Score - Toilet Transfer 7   Toilet Transfer Discharge Goal   Discharge Goal 6   Car Transfer   Reason if not Attempted Safety concerns   CARE Score - Car Transfer 88   Car Transfer Discharge Goal   Discharge Goal 5   Walk 10 Feet   Reason if not Attempted Activity not applicable   CARE Score - Walk 10 Feet 9   Walk 10 Feet Discharge Goal   Discharge Goal 4   Walk 50 Feet with Two Turns   Reason if not Attempted Activity not applicable   CARE Score - Walk 50 Feet with Two Turns 9   Walk 50 Feet with Two  Turns Discharge Goal   Discharge Goal 9   Walk 150 Feet   Reason if not Attempted Activity not applicable   CARE Score - Walk 150 Feet 9   Walk 150 Feet Discharge Goal   Discharge Goal 9   Walking 10 Feet on Uneven Surfaces   Reason if not Attempted Activity not applicable   CARE Score - Walking 10 Feet on Uneven Surfaces 9   Walking 10 Feet on Uneven Surfaces Discharge Goal   Discharge Goal 9   1 Step (Curb)   Reason if not Attempted Activity not applicable   CARE Score - 1 Step (Curb) 9   1 Step (Curb) Discharge Goal   Discharge Goal 9   4 Steps   Reason if not Attempted Activity not applicable   CARE Score - 4 Steps 9   4 Steps Discharge Goal   Discharge Goal 9   12 Steps   Reason if not Attempted Activity not applicable   CARE Score - 12 Steps 9   12 Steps Discharge Goal   Discharge Goal 9   Picking Up Object   Reason if not Attempted Activity not applicable   CARE Score - Picking Up Object 9   Picking Up Object Discharge Goal   Discharge Goal 9   Wheel 50 Feet with Two Turns   Assistance Needed Supervision   Physical Assistance Level No physical assistance   CARE Score - Wheel 50 Feet with Two Turns 4   Type of Wheelchair/Scooter Manual   Wheel 50 Feet with Two Turns Discharge Goal   Discharge Goal 6   Wheel 150 Feet   Assistance Needed Supervision   Physical Assistance Level No physical assistance   CARE Score - Wheel 150 Feet 4   Type of Wheelchair/Scooter Manual   Wheel 150 Feet Discharge Goal   Discharge Goal 6   Wheelchair Functional Level of Assist   Wheelchair Assist Supervised   Distance Wheelchair (Feet or Distance) 150   Wheelchair Description Limited by fatigue;Supervision for safety;Verbal cueing  (Pt limited by pain in L shoulder and fatigue after ~150' with 2 turns)   Transfer Functional Level of Assist   Bed, Chair, Wheelchair Transfer Minimal Assist  (Slideboard)   Bed Chair Wheelchair Transfer Description Increased time;Slideboard transfer from bed to wheelchair;Initial preparation for  task;Set-up of equipment;Supervision for safety;Verbal cueing  (Pt min-A using slideboard in both directions.)   Problem List    Problems Pain   Precautions   Precautions Fall Risk;Immobilizer Left Lower Extremity   Comments L BKA   Current Discharge Plan   Current Discharge Plan Temporarily go to Family /  Friend's Home   Interdisciplinary Plan of Care Collaboration   IDT Collaboration with  Occupational Therapist   Patient Position at End of Therapy In Bed;Bed Alarm On;Call Light within Reach;Tray Table within Reach;Phone within Reach   Collaboration Comments OT: CLOF, home environment   PT DME Recommendations   Wheelchair   (Pt already has his own WC and slideboard)   Assistive Device Front Wheeled Walker   Physical Therapist Assigned   Assigned PT / Treatment Time / Comments ANUSHA   Benefit   Therapy Benefit Patient Would Benefit from Inpatient Rehabilitation Physical Therapy to Maximize Functional [place] with ADLs, IADLs and Mobility.   Strengths & Barriers   Strengths Able to follow instructions;Alert and oriented;Effective communication skills;Good carryover of learning;Good insight into deficits/needs;Independent prior level of function;Motivated for self care and independence;Pleasant and cooperative;Supportive family;Willingly participates in therapeutic activities   Barriers Fatigue;Generalized weakness;Impaired activity tolerance;Impaired balance;Pain     Static standing in // bars BUE support: 2x 30 seconds modA and R knee block (maxA for STS from WC)     02/09/24 1331   Transfer Functional Level of Assist   Bed, Chair, Wheelchair Transfer Contact Guard Assist   Bed Chair Wheelchair Transfer Description Initial preparation for task;Set-up of equipment;Supervision for safety   Supine Lower Body Exercise   Hip Abduction 1 set of 10;Light Resistance Theraband   Hip Adduction  1 set of 10   Knee to Chest 1 set of 10  (with added knee extension)   Other Exercises Prone/Supine BKA therex  (Prone laying 3 x  1 minute, prone hip extension 1x10, prone glute sets x10 5sec hold)   Bed Mobility    Supine to Sit Independent   Sit to Supine Independent   Sit to Stand Maximal Assist   Scooting Independent   Rolling Independent   Interdisciplinary Plan of Care Collaboration   Patient Position at End of Therapy Seated;Chair Alarm On;Call Light within Reach;Tray Table within Reach;Phone within Reach   Second session was focused on introduction of supine and prone HEP for BKA.     Pt was educated on the importance of limb positioning with knee in extension at rest, donning of IPOP in and oob, and importance of ace wrap for compression.     Assessment  Patient is 53 y.o. male with a diagnosis of L BKA after pathologic fracture and osteomyelitis of L foot. Pt's PLOF is independent with all mobility and ADLs. Pt will D/C to his parents [facility] with a ramp to the front entrance, with assistance as needed. Pt has already purchased his on manual WC, slideboard, shower bench, ramp, FWW, and commode. Pt currently presents with balance deficits, generalized weakness, and poor activity tolerance that are negatively impacting his functional independence. Additional factors influencing patient status / progress: pt has high levels of motivation and participation, strong family support, independent prior level of function, previous hx of R trans-metatarsal amputation, diabetes, hemodialysis x3 days/week, and L shoulder pain from a recent fracture. Pt will benefit from daily skilled physical therapy in order to address deficits listed above.     Plan  Recommend Physical Therapy  minutes per day 5-7 days per week for 7-10 days for the following treatments:  PT Gait Training, PT Therapeutic Exercises, PT Neuro Re-Ed/Balance, PT Therapeutic Activity, PT Wound Therapy, and PT Evaluation.    Passport items to be completed:  Get in/out of bed safely, in/out of a vehicle, safely use mobility device, walk or wheel around home/community, navigate up  and down stairs, show how to get up/down from the ground, ensure home is accessible, demonstrate HEP, complete caregiver training    Goals:  Long term and short term goals have been discussed with patient and they are in agreement.    Physical Therapy Problems (Active)       Problem: Balance       Dates: Start:  02/09/24         Goal: STG-Within one week, patient will maintain static standing in parallel bars Gina with single UE support for 10 seconds in order to facilitate completion of standing ADLs       Dates: Start:  02/09/24               Problem: Mobility       Dates: Start:  02/09/24         Goal: STG-Within one week, patient will propel wheelchair community distances of at least 150' independently       Dates: Start:  02/09/24               Problem: Mobility Transfers       Dates: Start:  02/09/24         Goal: STG-Within one week, patient will sit to stand from WC Gina with UE pushing from WC        Dates: Start:  02/09/24            Goal: STG-Within one week, patient will transfer bed to chair CGA        Dates: Start:  02/09/24               Problem: PT-Long Term Goals       Dates: Start:  02/09/24         Goal: LTG-By discharge, patient will tolerate standing with intermittent UE support for at least 1 minute CGA in order to facilitate standing ADLs        Dates: Start:  02/09/24            Goal: LTG-By discharge, patient will perform squat pivot transfer from one surface to another SPV       Dates: Start:  02/09/24            Goal: LTG-By discharge, patient will perform home exercise program independently        Dates: Start:  02/09/24            Goal: LTG-By discharge, patient will transfer in/out of a car using LRAD or no AD CGA       Dates: Start:  02/09/24

## 2024-02-09 NOTE — PROGRESS NOTES
Physical Medicine & Rehabilitation Progress Note    Encounter Date: 2/9/2024    Chief Complaint: Feeling well today.    Interval Events (Subjective):  Vital signs with elevated systolic blood pressure at 159  No BM documented -abnormal frequency of bowel movements at baseline  Voiding with PVRs in the 200s    Patient seen and examined in his room.  Parents are at bedside.  Discussed his diabetes management at home.  Discussed blood pressure medication as well.  He never took Jardiance because it was too expensive.  He would be okay with trying to use compression stockings.  He would like some of the fluid to be removed.      ROS: 14 point ROS negative unless otherwise specified in the HPI    Objective:  VITAL SIGNS: BP (!) 159/86   Pulse 72   Temp 37.1 °C (98.8 °F) (Oral)   Resp 18   Ht 1.829 m (6')   Wt 102 kg (225 lb)   SpO2 97%   BMI 30.52 kg/m²     GEN: No apparent distress  HEENT: Head normocephalic, atraumatic.  Sclera nonicteric bilaterally, no ocular discharge appreciated bilaterally.  CV: Extremities warm and well-perfused, edematous extremities.  PULMONARY: Breathing nonlabored on room air, no respiratory accessory muscle use.  Not requiring supplemental oxygen.  ABD: Soft, nontender.  SKIN: No appreciable skin breakdown on exposed areas of skin.  PSYCH: Mood and affect within normal limits.  NEURO: Awake alert.  Conversational.  Logical thought content.  MSK: Left BKA present. Erythematous from wrapping, but NOT warm to touch, anymore so than rest of residual limb. Non purulent. Patient non toxic appearing.         Laboratory Values:  Recent Results (from the past 72 hour(s))   POCT glucose device results    Collection Time: 02/06/24 11:47 AM   Result Value Ref Range    POC Glucose, Blood 233 (H) 65 - 99 mg/dL   POCT glucose device results    Collection Time: 02/06/24  5:02 PM   Result Value Ref Range    POC Glucose, Blood 236 (H) 65 - 99 mg/dL   POCT glucose device results    Collection Time:  02/06/24 10:04 PM   Result Value Ref Range    POC Glucose, Blood 247 (H) 65 - 99 mg/dL   Basic Metabolic Panel    Collection Time: 02/07/24  2:09 AM   Result Value Ref Range    Sodium 132 (L) 135 - 145 mmol/L    Potassium 4.3 3.6 - 5.5 mmol/L    Chloride 102 96 - 112 mmol/L    Co2 23 20 - 33 mmol/L    Glucose 211 (H) 65 - 99 mg/dL    Bun 27 (H) 8 - 22 mg/dL    Creatinine 3.13 (H) 0.50 - 1.40 mg/dL    Calcium 7.0 (L) 8.5 - 10.5 mg/dL    Anion Gap 7.0 7.0 - 16.0   CBC WITHOUT DIFFERENTIAL    Collection Time: 02/07/24  2:09 AM   Result Value Ref Range    WBC 7.3 4.8 - 10.8 K/uL    RBC 3.14 (L) 4.70 - 6.10 M/uL    Hemoglobin 9.2 (L) 14.0 - 18.0 g/dL    Hematocrit 27.4 (L) 42.0 - 52.0 %    MCV 87.3 81.4 - 97.8 fL    MCH 29.3 27.0 - 33.0 pg    MCHC 33.6 32.3 - 36.5 g/dL    RDW 43.3 35.9 - 50.0 fL    Platelet Count 133 (L) 164 - 446 K/uL    MPV 10.1 9.0 - 12.9 fL   Renal Function Panel    Collection Time: 02/07/24  2:09 AM   Result Value Ref Range    Correct Calcium 9.0 8.5 - 10.5 mg/dL    Phosphorus 2.5 2.5 - 4.5 mg/dL    Albumin 1.5 (L) 3.2 - 4.9 g/dL   ESTIMATED GFR    Collection Time: 02/07/24  2:09 AM   Result Value Ref Range    GFR (CKD-EPI) 23 (A) >60 mL/min/1.73 m 2   POCT glucose device results    Collection Time: 02/07/24  7:25 AM   Result Value Ref Range    POC Glucose, Blood 201 (H) 65 - 99 mg/dL   POCT glucose device results    Collection Time: 02/07/24 11:37 AM   Result Value Ref Range    POC Glucose, Blood 194 (H) 65 - 99 mg/dL   POCT glucose device results    Collection Time: 02/07/24  4:42 PM   Result Value Ref Range    POC Glucose, Blood 180 (H) 65 - 99 mg/dL   POCT glucose device results    Collection Time: 02/07/24  9:23 PM   Result Value Ref Range    POC Glucose, Blood 196 (H) 65 - 99 mg/dL   Basic Metabolic Panel    Collection Time: 02/08/24  4:00 AM   Result Value Ref Range    Sodium 131 (L) 135 - 145 mmol/L    Potassium 3.9 3.6 - 5.5 mmol/L    Chloride 101 96 - 112 mmol/L    Co2 25 20 - 33 mmol/L     Glucose 134 (H) 65 - 99 mg/dL    Bun 23 (H) 8 - 22 mg/dL    Creatinine 2.54 (H) 0.50 - 1.40 mg/dL    Calcium 7.2 (L) 8.5 - 10.5 mg/dL    Anion Gap 5.0 (L) 7.0 - 16.0   CBC WITHOUT DIFFERENTIAL    Collection Time: 02/08/24  4:00 AM   Result Value Ref Range    WBC 7.2 4.8 - 10.8 K/uL    RBC 2.86 (L) 4.70 - 6.10 M/uL    Hemoglobin 8.3 (L) 14.0 - 18.0 g/dL    Hematocrit 25.1 (L) 42.0 - 52.0 %    MCV 87.8 81.4 - 97.8 fL    MCH 29.0 27.0 - 33.0 pg    MCHC 33.1 32.3 - 36.5 g/dL    RDW 43.4 35.9 - 50.0 fL    Platelet Count 151 (L) 164 - 446 K/uL    MPV 9.7 9.0 - 12.9 fL   Renal Function Panel    Collection Time: 02/08/24  4:00 AM   Result Value Ref Range    Correct Calcium 9.1 8.5 - 10.5 mg/dL    Phosphorus 2.2 (L) 2.5 - 4.5 mg/dL    Albumin 1.6 (L) 3.2 - 4.9 g/dL   ESTIMATED GFR    Collection Time: 02/08/24  4:00 AM   Result Value Ref Range    GFR (CKD-EPI) 29 (A) >60 mL/min/1.73 m 2   POCT glucose device results    Collection Time: 02/08/24  8:46 AM   Result Value Ref Range    POC Glucose, Blood 154 (H) 65 - 99 mg/dL   POCT glucose device results    Collection Time: 02/08/24  5:10 PM   Result Value Ref Range    POC Glucose, Blood 222 (H) 65 - 99 mg/dL   POCT glucose device results    Collection Time: 02/08/24  9:22 PM   Result Value Ref Range    POC Glucose, Blood 227 (H) 65 - 99 mg/dL   CBC with Differential    Collection Time: 02/09/24  5:57 AM   Result Value Ref Range    WBC 6.7 4.8 - 10.8 K/uL    RBC 3.27 (L) 4.70 - 6.10 M/uL    Hemoglobin 9.4 (L) 14.0 - 18.0 g/dL    Hematocrit 28.4 (L) 42.0 - 52.0 %    MCV 86.9 81.4 - 97.8 fL    MCH 28.7 27.0 - 33.0 pg    MCHC 33.1 32.3 - 36.5 g/dL    RDW 42.8 35.9 - 50.0 fL    Platelet Count 172 164 - 446 K/uL    MPV 10.1 9.0 - 12.9 fL    Neutrophils-Polys 64.10 44.00 - 72.00 %    Lymphocytes 23.40 22.00 - 41.00 %    Monocytes 6.70 0.00 - 13.40 %    Eosinophils 4.60 0.00 - 6.90 %    Basophils 0.60 0.00 - 1.80 %    Immature Granulocytes 0.60 0.00 - 0.90 %    Nucleated RBC 0.00 0.00 -  0.20 /100 WBC    Neutrophils (Absolute) 4.28 1.82 - 7.42 K/uL    Lymphs (Absolute) 1.56 1.00 - 4.80 K/uL    Monos (Absolute) 0.45 0.00 - 0.85 K/uL    Eos (Absolute) 0.31 0.00 - 0.51 K/uL    Baso (Absolute) 0.04 0.00 - 0.12 K/uL    Immature Granulocytes (abs) 0.04 0.00 - 0.11 K/uL    NRBC (Absolute) 0.00 K/uL   Comp Metabolic Panel (CMP)    Collection Time: 02/09/24  5:57 AM   Result Value Ref Range    Sodium 130 (L) 135 - 145 mmol/L    Potassium 4.1 3.6 - 5.5 mmol/L    Chloride 100 96 - 112 mmol/L    Co2 23 20 - 33 mmol/L    Anion Gap 7.0 7.0 - 16.0    Glucose 166 (H) 65 - 99 mg/dL    Bun 30 (H) 8 - 22 mg/dL    Creatinine 2.57 (H) 0.50 - 1.40 mg/dL    Calcium 7.0 (L) 8.5 - 10.5 mg/dL    Correct Calcium 8.8 8.5 - 10.5 mg/dL    AST(SGOT) 12 12 - 45 U/L    ALT(SGPT) <5 2 - 50 U/L    Alkaline Phosphatase 72 30 - 99 U/L    Total Bilirubin 0.4 0.1 - 1.5 mg/dL    Albumin 1.8 (L) 3.2 - 4.9 g/dL    Total Protein 5.2 (L) 6.0 - 8.2 g/dL    Globulin 3.4 1.9 - 3.5 g/dL    A-G Ratio 0.5 g/dL   Magnesium    Collection Time: 02/09/24  5:57 AM   Result Value Ref Range    Magnesium 1.5 1.5 - 2.5 mg/dL   Phosphorus    Collection Time: 02/09/24  5:57 AM   Result Value Ref Range    Phosphorus 2.7 2.5 - 4.5 mg/dL   TSH with Reflex to FT4    Collection Time: 02/09/24  5:57 AM   Result Value Ref Range    TSH 5.730 (H) 0.380 - 5.330 uIU/mL   ESTIMATED GFR    Collection Time: 02/09/24  5:57 AM   Result Value Ref Range    GFR (CKD-EPI) 29 (A) >60 mL/min/1.73 m 2   FREE THYROXINE    Collection Time: 02/09/24  5:57 AM   Result Value Ref Range    Free T-4 0.96 0.93 - 1.70 ng/dL   POCT glucose device results    Collection Time: 02/09/24  8:12 AM   Result Value Ref Range    POC Glucose, Blood 165 (H) 65 - 99 mg/dL       Medications:  Scheduled Medications   Medication Dose Frequency    therapeutic multivitamin-minerals  1 Tablet DAILY WITH LUNCH    ascorbic acid  500 mg BID WITH MEALS    zinc sulfate  220 mg DAILY WITH LUNCH    aspirin  81 mg  DAILY    atorvastatin  80 mg Q EVENING    calcium carbonate  500 mg BID WITH MEALS    epoetin  5,000 Units MO, WE + FR    heparin  5,000 Units Q8HRS    insulin GLARGINE  15 Units Q EVENING    insulin regular  3-14 Units 4X/DAY ACHS    omeprazole  20 mg QAM    Pharmacy Consult Request  1 Each PHARMACY TO DOSE    senna-docusate  2 Tablet BID    cholestyramine  1 Packet BID     PRN medications: Respiratory Therapy Consult, hydrALAZINE, carboxymethylcellulose, benzocaine-menthol, mag hydrox-al hydrox-simeth, ondansetron **OR** ondansetron, traZODone, sodium chloride, insulin regular **AND** POC blood glucose manual result **AND** NOTIFY MD and PharmD **AND** Administer 20 grams of glucose (approximately 8 ounces of fruit juice) every 15 minutes PRN FSBG less than 70 mg/dL **AND** dextrose bolus, senna-docusate **AND** polyethylene glycol/lytes **AND** bisacodyl, acetaminophen, heparin, lidocaine PF, oxyCODONE immediate-release **OR** oxyCODONE immediate-release    Diet:  Current Diet Order   Procedures    Diet Order Diet: Consistent CHO (Diabetic); Second Modifier: (optional): Renal       Medical Decision Making and Plan:  Left BKA secondary to acute on chronic osteomyelitis in setting of diabetes mellitus  Osteomyelitis resolved with infection control with BKA and s/p IV antibiotics  PT and OT for mobility and ADLs. Per guidelines, 15 hours per week between PT, OT and/or SLP.  Follow-up orthopedics    2/9 erythematous residual limb on admission.  WBC is reassuring and is normal. Despite erythema, not with any excess warmth compared to rest of residual limb. No purulent. No clinical concern for infection at this time. Possible due to gravity dependency when up with therapy and pressure from wrapping. Continue to monitor closely.      Acute blood loss anemia  Postoperative and CKD, required 1 unit PRBC  Hemoglobin 2/9 is improved at 9.4     Acute renal failure stage IIIb CKD now on HD  S/p right IJ tunneled cath  placement 1/29/2024  Hemodialysis Monday, Wednesday, Friday  Fluid positive  2/9 place compression stocking right lower extremity  2/9 discussed with nephrology regarding use of Lasix, initiated.   Nephrology following     Type 2 diabetes mellitus with hyperglycemia, insulin-dependent  Long-term complications causing CKD, peripheral neuropathy, neurogenic bowel  Continue long-acting and short acting insulin  At home had been on Lantus 36 at night with SSI     Hypocalcemia  Continue supplementing    Hyperphosphatemia  Resolved on admission 2.7    Hyponatremia  Patient has specific diet for his GI system  On dialysis  Nephrology following    Elevated TSH  Normal free T4  Continue to monitor     History of CAD s/p ARTURO 9/22/2019  Last cardiology note 7/7/2023: On atorvastatin 80 mg, aspirin 81 mg, carvedilol 3.125 mg twice daily, Jardiance 25 mg daily  Continue aspirin 81 mg daily and statin 80 mg nightly     Hypertension  Last cardiology note 7/7/2023: Previously had been on carvedilol 3.125 mg twice daily and enalapril 10 mg twice daily  2/9 blood pressures running in the 130s-150s. Hold on BP medication initiation, potential diuresis.      Pain  Currently not reporting pain  As needed Tylenol  As needed oxycodone     Skin  Patient at risk for skin breakdown due to debility in areas including sacrum, achilles, elbows and head in addition to other sites. Nursing to assess skin daily.      GI Ppx  Patient on Prilosec for GERD prophylaxis.      Bowel   Neurogenic bowel secondary to long-term complications from diabetes  Colestyramine at 10 AM and 5 PM  Follows with GI  Patient on Senna-docusate for constipation prophylaxis.   Abnormal frequency of bowel movements at baseline.  Can go 5 to 7 days.     Bladder  TV/PVR/BS PRN   PVRs in 200's, likely positional. Monitor     Upcoming Labs/imaging: Labs per nephrology. CBC 2/12     DVT PROPHYLAXIS: Heparin 5000 units q8 hrs     HOSPITALIST FOLLOWING: No. Nephrology following -  d/w nephrology 2/9     CODE STATUS: FULL CODE     DISPO: Home with supportive parents.      LISE: TBD     ADDITIONAL MEDICAL NEEDS ON D/C (IV abx, O2, etc): TBD     MEDS SENT TO: TBD     DISCHARGE SPECIALIST FOLLOW UP: Orthopedics ([facility] Segura)     Patient to scheduled follow up with their PCP within 2 weeks from discharge from the [facility].      ____________________________________    Dr. Mena Ny DO, MS  [facility] - Physical Medicine & Rehabilitation   ____________________________________

## 2024-02-10 ENCOUNTER — APPOINTMENT (OUTPATIENT)
Dept: OCCUPATIONAL THERAPY | Facility: REHABILITATION | Age: 54
DRG: 559 | End: 2024-02-10
Attending: PHYSICAL MEDICINE & REHABILITATION
Payer: MEDICARE

## 2024-02-10 LAB
ANION GAP SERPL CALC-SCNC: 7 MMOL/L (ref 7–16)
BUN SERPL-MCNC: 32 MG/DL (ref 8–22)
CALCIUM SERPL-MCNC: 7 MG/DL (ref 8.5–10.5)
CHLORIDE SERPL-SCNC: 102 MMOL/L (ref 96–112)
CO2 SERPL-SCNC: 22 MMOL/L (ref 20–33)
CREAT SERPL-MCNC: 2.57 MG/DL (ref 0.5–1.4)
GFR SERPLBLD CREATININE-BSD FMLA CKD-EPI: 29 ML/MIN/1.73 M 2
GLUCOSE BLD STRIP.AUTO-MCNC: 105 MG/DL (ref 65–99)
GLUCOSE BLD STRIP.AUTO-MCNC: 145 MG/DL (ref 65–99)
GLUCOSE BLD STRIP.AUTO-MCNC: 158 MG/DL (ref 65–99)
GLUCOSE BLD STRIP.AUTO-MCNC: 165 MG/DL (ref 65–99)
GLUCOSE BLD STRIP.AUTO-MCNC: 189 MG/DL (ref 65–99)
GLUCOSE SERPL-MCNC: 120 MG/DL (ref 65–99)
POTASSIUM SERPL-SCNC: 4.1 MMOL/L (ref 3.6–5.5)
SODIUM SERPL-SCNC: 131 MMOL/L (ref 135–145)

## 2024-02-10 PROCEDURE — 36415 COLL VENOUS BLD VENIPUNCTURE: CPT

## 2024-02-10 PROCEDURE — 700111 HCHG RX REV CODE 636 W/ 250 OVERRIDE (IP): Performed by: PHYSICAL MEDICINE & REHABILITATION

## 2024-02-10 PROCEDURE — 770010 HCHG ROOM/CARE - REHAB SEMI PRIVAT*

## 2024-02-10 PROCEDURE — 80048 BASIC METABOLIC PNL TOTAL CA: CPT

## 2024-02-10 PROCEDURE — 97110 THERAPEUTIC EXERCISES: CPT

## 2024-02-10 PROCEDURE — A9270 NON-COVERED ITEM OR SERVICE: HCPCS | Performed by: INTERNAL MEDICINE

## 2024-02-10 PROCEDURE — 82962 GLUCOSE BLOOD TEST: CPT | Mod: 91

## 2024-02-10 PROCEDURE — A9270 NON-COVERED ITEM OR SERVICE: HCPCS | Performed by: PHYSICAL MEDICINE & REHABILITATION

## 2024-02-10 PROCEDURE — 700102 HCHG RX REV CODE 250 W/ 637 OVERRIDE(OP): Performed by: INTERNAL MEDICINE

## 2024-02-10 PROCEDURE — 700102 HCHG RX REV CODE 250 W/ 637 OVERRIDE(OP): Performed by: PHYSICAL MEDICINE & REHABILITATION

## 2024-02-10 PROCEDURE — 99232 SBSQ HOSP IP/OBS MODERATE 35: CPT | Performed by: INTERNAL MEDICINE

## 2024-02-10 RX ADMIN — Medication 1 TABLET: at 13:59

## 2024-02-10 RX ADMIN — CHOLESTYRAMINE 4 G: 4 POWDER, FOR SUSPENSION ORAL at 10:00

## 2024-02-10 RX ADMIN — INSULIN HUMAN 3 UNITS: 100 INJECTION, SOLUTION PARENTERAL at 21:17

## 2024-02-10 RX ADMIN — ATORVASTATIN CALCIUM 80 MG: 40 TABLET, FILM COATED ORAL at 21:12

## 2024-02-10 RX ADMIN — HEPARIN SODIUM 5000 UNITS: 5000 INJECTION, SOLUTION INTRAVENOUS; SUBCUTANEOUS at 21:12

## 2024-02-10 RX ADMIN — INSULIN GLARGINE-YFGN 15 UNITS: 100 INJECTION, SOLUTION SUBCUTANEOUS at 21:16

## 2024-02-10 RX ADMIN — OXYCODONE HYDROCHLORIDE AND ACETAMINOPHEN 500 MG: 500 TABLET ORAL at 05:24

## 2024-02-10 RX ADMIN — FUROSEMIDE 60 MG: 20 TABLET ORAL at 17:14

## 2024-02-10 RX ADMIN — OXYCODONE HYDROCHLORIDE AND ACETAMINOPHEN 500 MG: 500 TABLET ORAL at 17:14

## 2024-02-10 RX ADMIN — CHOLESTYRAMINE 4 G: 4 POWDER, FOR SUSPENSION ORAL at 17:14

## 2024-02-10 RX ADMIN — FUROSEMIDE 60 MG: 20 TABLET ORAL at 05:24

## 2024-02-10 RX ADMIN — CALCIUM 500 MG: 500 TABLET ORAL at 07:27

## 2024-02-10 RX ADMIN — OMEPRAZOLE 20 MG: 20 CAPSULE, DELAYED RELEASE ORAL at 07:28

## 2024-02-10 RX ADMIN — HEPARIN SODIUM 5000 UNITS: 5000 INJECTION, SOLUTION INTRAVENOUS; SUBCUTANEOUS at 05:23

## 2024-02-10 RX ADMIN — HEPARIN SODIUM 5000 UNITS: 5000 INJECTION, SOLUTION INTRAVENOUS; SUBCUTANEOUS at 13:59

## 2024-02-10 RX ADMIN — CALCIUM 500 MG: 500 TABLET ORAL at 17:14

## 2024-02-10 RX ADMIN — Medication 220 MG: at 13:59

## 2024-02-10 RX ADMIN — ASPIRIN 81 MG: 81 TABLET, COATED ORAL at 05:24

## 2024-02-10 RX ADMIN — INSULIN HUMAN 3 UNITS: 100 INJECTION, SOLUTION PARENTERAL at 11:45

## 2024-02-10 ASSESSMENT — ENCOUNTER SYMPTOMS
CHILLS: 0
NAUSEA: 0
SHORTNESS OF BREATH: 0
COUGH: 0
VOMITING: 0
FEVER: 0

## 2024-02-10 ASSESSMENT — PAIN DESCRIPTION - PAIN TYPE: TYPE: ACUTE PAIN

## 2024-02-10 NOTE — THERAPY
Occupational Therapy  Daily Treatment     Patient Name: Beni Moore  Age:  53 y.o., Sex:  male  Medical Record #: [id number]  Today's Date: 2/10/2024     Precautions  Precautions: (P) Fall Risk, Non Weight Bearing Left Upper Extremity, Immobilizer Left Lower Extremity  Comments: (P) L BKA         Subjective    Pt was alert and cooperative w/ tx.     Objective       02/10/24 1401   Precautions   Precautions Fall Risk;Non Weight Bearing Left Upper Extremity;Immobilizer Left Lower Extremity   Comments L BKA   Pain   Intervention Declines   Cognition    Cognition / Consciousness WDL   Level of Consciousness Alert   ABS (Agitated Behavior Scale)   Agitated Behavior Scale Performed No   Functional Level of Assist   Bed, Chair, Wheelchair Transfer Contact Guard Assist   Bed Chair Wheelchair Transfer Description Increased time;Initial preparation for task;Other (comment)  (Slideboard)   Sitting Upper Body Exercises   Chest Press 3 sets of 10  (Equalizer, Vertical Chest Press, 3x10@15#'s)   Lat Pull 3 sets of 10  (Equalizer, 3x10@15#'s)   Tricep Press 3 sets of 10  (Rickshaw, 3x10 Forward facing 35#'s, Reverse facing 3x10@ 35#'s)   Upper Extremity Bike Level 2 Resistance  (FluidoBike, 15 mins, rest break x 2, 1.168km)         Assessment    OT tx emphasis on transfers via slideboard (CGA) and BUE TherEx - see notes above for details.  Strengths: Able to follow instructions, Alert and oriented, Effective communication skills, Good endurance, Good insight into deficits/needs, Independent prior level of function, Motivated for self care and independence, Pleasant and cooperative, Supportive family, Willingly participates in therapeutic activities  Barriers: Decreased endurance, Fatigue, Generalized weakness, Impaired activity tolerance, Impaired balance, Limited mobility    Plan    Refer to primary OT for POC.    DME  OT DME Recommendations  Bathroom Equipment:  (Grab bars in shower)    Passport items to be completed:  Perform  bathroom transfers, complete dressing, complete feeding, get ready for the day, prepare a simple meal, participate in household tasks, adapt home for safety needs, demonstrate home exercise program, complete caregiver training     Occupational Therapy Goals (Active)       Problem: Bathing       Dates: Start:  02/09/24         Goal: STG-Within one week, patient will bathe w/ CGA and AE/DME as needed       Dates: Start:  02/09/24               Problem: Dressing       Dates: Start:  02/09/24         Goal: STG-Within one week, patient will dress LB w/ min A and AE as needed       Dates: Start:  02/09/24               Problem: Functional Transfers       Dates: Start:  02/09/24         Goal: STG-Within one week, patient will consistently transfer w/ CGA and DME as needed       Dates: Start:  02/09/24               Problem: IADL's       Dates: Start:  02/09/24         Goal: STG-Within one week, patient will access kitchen area w/ min A and LRAD       Dates: Start:  02/09/24               Problem: OT Long Term Goals       Dates: Start:  02/09/24         Goal: LTG-By discharge, patient will complete basic self care tasks w/ mod I/supervision and AE/DME as needed       Dates: Start:  02/09/24            Goal: LTG-By discharge, patient will complete basic home management w/ supervision and LRAD       Dates: Start:  02/09/24               Problem: Toileting       Dates: Start:  02/09/24         Goal: STG-Within one week, patient will complete toileting tasks w/ min A and AE/DME as needed       Dates: Start:  02/09/24

## 2024-02-10 NOTE — PROGRESS NOTES
NURSING DAILY NOTE    Name: Beni Moore   Date of Admission: 2/8/2024   Admitting Diagnosis: Hx of BKA, left (HCC)  Attending Physician: Mena Ny D.o.  Allergies: Patient has no known allergies.    Safety  Patient Assist     Patient Precautions  Fall Risk, Immobilizer Left Lower Extremity  Precaution Comments  L BKA  Bed Transfer Status  Contact Guard Assist  Toilet Transfer Status   Minimal Assist  Assistive Devices  Wheelchair  Oxygen  None - Room Air  Diet/Therapeutic Dining  Current Diet Order   Procedures    Diet Order Diet: Consistent CHO (Diabetic); Second Modifier: (optional): Renal     Pill Administration  whole  Agitated Behavioral Scale     ABS Level of Severity       Fall Risk  Has the patient had a fall this admission?   No  Florecita Roldan Fall Risk Scoring  17, HIGH RISK  Fall Risk Safety Measures  bed alarm and poor balance    Vitals  Temperature: 36.4 °C (97.6 °F)  Temp src: Oral  Pulse: 70  Respiration: 20  Blood Pressure: (!) 150/86  Blood Pressure MAP (Calculated): 107 MM HG  BP Location: Right, Upper Arm  Patient BP Position: Supine     Oxygen  Pulse Oximetry: 98 %  O2 (LPM): 0  O2 Delivery Device: None - Room Air    Bowel and Bladder  Last Bowel Movement     Stool Type     Bowel Device     Continent  Bladder: Continent void   Bowel: Continent movement  Bladder Function  Urine Void (mL): 300 ml  Number of Times Voided: 1  Urine Color: Unable To Evaluate  Genitourinary Assessment   Urine Color: Unable To Evaluate  Time Void: Yes  Bladder Scan: Post Void  $ Bladder Scan Results (mL): 224    Skin  Johnny Score   15  Sensory Interventions   Bed Types: Standard/Trauma Mattress  Skin Preventative Measures: Pillows in Use to Float Heels  Moisture Interventions         Pain  Pain Rating Scale  0 - No Pain  Pain Location  Shoulder  Pain Location Orientation  Left  Pain Interventions   Declines    ADLs    Bathing      Linen Change       Personal Hygiene     Chlorhexidine Bath      Oral Care     Teeth/Dentures     Shave     Nutrition Percentage Eaten  Between 50-75% Consumed  Environmental Precautions     Patient Turns/Positioning  Patient Turns Self from Side to Side  Patient Turns Assistance/Tolerance     Bed Positions     Head of Bed Elevated         Psychosocial/Neurologic Assessment  Psychosocial Assessment  Psychosocial (WDL):  Within Defined Limits  Neurologic Assessment  Neuro (WDL): Within Defined Limits  Level of Consciousness: Alert  EENT (WDL):  Within Defined Limits    Cardio/Pulmonary Assessment  Edema      Respiratory Breath Sounds  RUL Breath Sounds: Clear  RML Breath Sounds: Clear  RLL Breath Sounds: Clear  CHRIS Breath Sounds: Clear  LLL Breath Sounds: Clear  Cardiac Assessment   Cardiac (WDL):  WDL Except (HX: HTN)

## 2024-02-10 NOTE — CARE PLAN
"The patient is Watcher - Medium risk of patient condition declining or worsening    Shift Goals  Clinical Goals: Safety  Patient Goals: Safety    Progress made toward(s) clinical / shift goals:    Problem: Skin Integrity  Goal: Skin integrity is maintained or improved  Note: Patient's skin remains intact and free from new or accidental injury this shift.  L BKA with dressing CDI. Will continue to monitor.     Problem: Fall Risk - Rehab  Goal: Patient will remain free from falls  Note: Florecita Roldan Fall risk Assessment Score: 17    High fall risk Interventions   - Alarming seatbelt    Bed and strip alarm   - Yellow sign by the door   - Yellow wrist band \"Fall risk\"  - Room near to the nurse station  - Do not leave patient unattended in the bathroom  - Fall risk education provided          "

## 2024-02-10 NOTE — PROGRESS NOTES
NURSING DAILY NOTE    Name: Beni Moore   Date of Admission: 2/8/2024   Admitting Diagnosis: Hx of BKA, left (HCC)  Attending Physician: Mena Ny D.o.  Allergies: Patient has no known allergies.    Safety  Patient Assist     Patient Precautions  Fall Risk, Immobilizer Left Lower Extremity  Precaution Comments  L BKA  Bed Transfer Status  Contact Guard Assist  Toilet Transfer Status   Minimal Assist  Assistive Devices  Wheelchair  Oxygen  None - Room Air  Diet/Therapeutic Dining  Current Diet Order   Procedures    Diet Order Diet: Consistent CHO (Diabetic); Second Modifier: (optional): Renal     Pill Administration  whole  Agitated Behavioral Scale     ABS Level of Severity       Fall Risk  Has the patient had a fall this admission?   No  Florecita Roldan Fall Risk Scoring  17, HIGH RISK  Fall Risk Safety Measures  bed alarm and poor balance    Vitals  Temperature: 36.4 °C (97.6 °F)  Temp src: Oral  Pulse: 70  Respiration: 20  Blood Pressure: (!) 150/86  Blood Pressure MAP (Calculated): 107 MM HG  BP Location: Right, Upper Arm  Patient BP Position: Supine     Oxygen  Pulse Oximetry: 98 %  O2 (LPM): 0  O2 Delivery Device: None - Room Air    Bowel and Bladder  Last Bowel Movement  02/09/24  Stool Type     Bowel Device     Continent  Bladder: Continent void   Bowel: Continent movement  Bladder Function  Urine Void (mL): 500 ml  Number of Times Voided: 1  Urine Color: Yellow  Genitourinary Assessment   Bladder Assessment (WDL):  WDL Except  Urine Color: Yellow  Time Void: Yes  Bladder Scan: Post Void  $ Bladder Scan Results (mL): 180    Skin  Johnny Score   15  Sensory Interventions   Bed Types: Standard/Trauma Mattress  Skin Preventative Measures: Pillows in Use for Support / Positioning  Moisture Interventions         Pain  Pain Rating Scale  0 - No Pain  Pain Location  Shoulder  Pain Location Orientation  Left  Pain Interventions    Declines    ADLs    Bathing      Linen Change      Personal Hygiene     Chlorhexidine Bath      Oral Care     Teeth/Dentures     Shave     Nutrition Percentage Eaten  Between 50-75% Consumed  Environmental Precautions     Patient Turns/Positioning  Patient Turns Self from Side to Side  Patient Turns Assistance/Tolerance     Bed Positions     Head of Bed Elevated         Psychosocial/Neurologic Assessment  Psychosocial Assessment  Psychosocial (WDL):  Within Defined Limits  Neurologic Assessment  Neuro (WDL): Within Defined Limits  Level of Consciousness: Alert  EENT (WDL):  Within Defined Limits    Cardio/Pulmonary Assessment  Edema      Respiratory Breath Sounds  RUL Breath Sounds: Clear  RML Breath Sounds: Clear  RLL Breath Sounds: Clear  CHRIS Breath Sounds: Clear  LLL Breath Sounds: Clear  Cardiac Assessment   Cardiac (WDL):  WDL Except (HX: HTN)

## 2024-02-10 NOTE — PROGRESS NOTES
Nephrology Daily Progress Note    Date of Service  2/10/2024    Chief Complaint  53 y.o. male admitted 1/25/2024 with diabetic foot infection, complicated by AXEL on CKD stage III, required dialysis    Interval Problem Update  Patient has no chest pain or shortness of breath today  He is being evaluated for inpatient rehab  2/8 patient has no new complaints  Plan to transfer to rehab today  2/9 patient has no new complaints, feels better overall  2/10 pt is doing better, no CP, no SOB  Review of Systems  Review of Systems   Constitutional:  Negative for chills, fever and malaise/fatigue.   Respiratory:  Negative for cough and shortness of breath.    Cardiovascular:  Negative for chest pain and leg swelling.   Gastrointestinal:  Negative for nausea and vomiting.   Genitourinary:  Negative for dysuria, frequency and urgency.        Physical Exam  Temp:  [36.4 °C (97.6 °F)-36.9 °C (98.4 °F)] 36.8 °C (98.2 °F)  Pulse:  [70-73] 72  Resp:  [16-20] 16  BP: (143-151)/(76-86) 143/78  SpO2:  [98 %-100 %] 100 %    Physical Exam  Vitals and nursing note reviewed.   Constitutional:       General: He is awake.      Appearance: He is not ill-appearing.   HENT:      Head: Normocephalic and atraumatic.      Right Ear: External ear normal.      Left Ear: External ear normal.      Nose: Nose normal.      Mouth/Throat:      Pharynx: No oropharyngeal exudate or posterior oropharyngeal erythema.   Eyes:      General:         Right eye: No discharge.         Left eye: No discharge.      Conjunctiva/sclera: Conjunctivae normal.   Cardiovascular:      Rate and Rhythm: Normal rate and regular rhythm.   Pulmonary:      Effort: Pulmonary effort is normal. No respiratory distress.      Breath sounds: Normal breath sounds. No wheezing.   Abdominal:      General: Abdomen is flat. Bowel sounds are normal.   Musculoskeletal:         General: No tenderness.      Cervical back: No rigidity. No muscular tenderness.      Right lower leg: Edema present.  "     Left lower leg: No edema.   Skin:     General: Skin is warm and dry.      Coloration: Skin is not jaundiced.      Findings: No lesion or rash.   Neurological:      General: No focal deficit present.      Mental Status: He is alert and oriented to person, place, and time. Mental status is at baseline.   Psychiatric:         Mood and Affect: Mood normal.         Behavior: Behavior normal.         Thought Content: Thought content normal.         Fluids    Intake/Output Summary (Last 24 hours) at 2/10/2024 1432  Last data filed at 2/10/2024 1229  Gross per 24 hour   Intake 620 ml   Output 1200 ml   Net -580 ml         Laboratory  Recent Labs     02/08/24  0400 02/09/24  0557   WBC 7.2 6.7   RBC 2.86* 3.27*   HEMOGLOBIN 8.3* 9.4*   HEMATOCRIT 25.1* 28.4*   MCV 87.8 86.9   MCH 29.0 28.7   MCHC 33.1 33.1   RDW 43.4 42.8   PLATELETCT 151* 172   MPV 9.7 10.1       Recent Labs     02/08/24  0400 02/09/24  0557 02/10/24  0616   SODIUM 131* 130* 131*   POTASSIUM 3.9 4.1 4.1   CHLORIDE 101 100 102   CO2 25 23 22   GLUCOSE 134* 166* 120*   BUN 23* 30* 32*   CREATININE 2.54* 2.57* 2.57*   CALCIUM 7.2* 7.0* 7.0*           No results for input(s): \"NTPROBNP\" in the last 72 hours.        Imaging  No orders to display         Assessment/Plan  1 AXEL on CKD stage IIIb: Etiology is most likely ATN, nonoliguric, possible early renal recovery  2 septic shock  3 anemia  4 osteomyelitis  5 Edema  no acute need for HD, evaluate daily  Await cr clearance  Continue diuretics  Renal diet  Daily BMP, CBC.  Renal dose all meds  Avoid nephrotoxins like NSAIDs.  Prognosis guarded.                     "

## 2024-02-11 ENCOUNTER — APPOINTMENT (OUTPATIENT)
Dept: OCCUPATIONAL THERAPY | Facility: REHABILITATION | Age: 54
DRG: 559 | End: 2024-02-11
Attending: PHYSICAL MEDICINE & REHABILITATION
Payer: MEDICARE

## 2024-02-11 ENCOUNTER — APPOINTMENT (OUTPATIENT)
Dept: PHYSICAL THERAPY | Facility: REHABILITATION | Age: 54
DRG: 559 | End: 2024-02-11
Attending: PHYSICAL MEDICINE & REHABILITATION
Payer: MEDICARE

## 2024-02-11 LAB
COLLECT DURATION TIME UR: 24 HR
CREAT CL/1.73 SQ M ?TM UR+SERPL-ARVRAT: 17 ML/MIN (ref 97–137)
CREAT SERPL-MCNC: 2.79 MG/DL (ref 0.5–1.4)
CREAT SERPL-MCNC: 2.79 MG/DL (ref 0.5–1.4)
CREAT UR-MCNC: 64.11 MG/DL
GFR SERPLBLD CREATININE-BSD FMLA CKD-EPI: 26 ML/MIN/1.73 M 2
GLUCOSE BLD STRIP.AUTO-MCNC: 145 MG/DL (ref 65–99)
GLUCOSE BLD STRIP.AUTO-MCNC: 177 MG/DL (ref 65–99)
GLUCOSE BLD STRIP.AUTO-MCNC: 221 MG/DL (ref 65–99)
GLUCOSE BLD STRIP.AUTO-MCNC: 254 MG/DL (ref 65–99)
SPECIMEN VOL UR: ABNORMAL ML
URINE CREATININE EXCRETED 1125: 898 MG/24 HR

## 2024-02-11 PROCEDURE — 700111 HCHG RX REV CODE 636 W/ 250 OVERRIDE (IP): Performed by: PHYSICAL MEDICINE & REHABILITATION

## 2024-02-11 PROCEDURE — 97162 PT EVAL MOD COMPLEX 30 MIN: CPT

## 2024-02-11 PROCEDURE — A9270 NON-COVERED ITEM OR SERVICE: HCPCS | Performed by: PHYSICAL MEDICINE & REHABILITATION

## 2024-02-11 PROCEDURE — 82962 GLUCOSE BLOOD TEST: CPT | Mod: 91

## 2024-02-11 PROCEDURE — 770010 HCHG ROOM/CARE - REHAB SEMI PRIVAT*

## 2024-02-11 PROCEDURE — 97535 SELF CARE MNGMENT TRAINING: CPT

## 2024-02-11 PROCEDURE — 97110 THERAPEUTIC EXERCISES: CPT

## 2024-02-11 PROCEDURE — 700102 HCHG RX REV CODE 250 W/ 637 OVERRIDE(OP): Performed by: INTERNAL MEDICINE

## 2024-02-11 PROCEDURE — 97530 THERAPEUTIC ACTIVITIES: CPT

## 2024-02-11 PROCEDURE — 700102 HCHG RX REV CODE 250 W/ 637 OVERRIDE(OP): Performed by: PHYSICAL MEDICINE & REHABILITATION

## 2024-02-11 PROCEDURE — A9270 NON-COVERED ITEM OR SERVICE: HCPCS | Performed by: INTERNAL MEDICINE

## 2024-02-11 RX ADMIN — FUROSEMIDE 60 MG: 20 TABLET ORAL at 15:53

## 2024-02-11 RX ADMIN — INSULIN HUMAN 4 UNITS: 100 INJECTION, SOLUTION PARENTERAL at 17:38

## 2024-02-11 RX ADMIN — HEPARIN SODIUM 5000 UNITS: 5000 INJECTION, SOLUTION INTRAVENOUS; SUBCUTANEOUS at 05:25

## 2024-02-11 RX ADMIN — ASPIRIN 81 MG: 81 TABLET, COATED ORAL at 05:25

## 2024-02-11 RX ADMIN — HEPARIN SODIUM 5000 UNITS: 5000 INJECTION, SOLUTION INTRAVENOUS; SUBCUTANEOUS at 15:52

## 2024-02-11 RX ADMIN — ATORVASTATIN CALCIUM 80 MG: 40 TABLET, FILM COATED ORAL at 21:03

## 2024-02-11 RX ADMIN — CHOLESTYRAMINE 4 G: 4 POWDER, FOR SUSPENSION ORAL at 10:04

## 2024-02-11 RX ADMIN — HEPARIN SODIUM 5000 UNITS: 5000 INJECTION, SOLUTION INTRAVENOUS; SUBCUTANEOUS at 21:03

## 2024-02-11 RX ADMIN — Medication 1 TABLET: at 15:52

## 2024-02-11 RX ADMIN — CALCIUM 500 MG: 500 TABLET ORAL at 09:05

## 2024-02-11 RX ADMIN — INSULIN HUMAN 3 UNITS: 100 INJECTION, SOLUTION PARENTERAL at 11:56

## 2024-02-11 RX ADMIN — OXYCODONE HYDROCHLORIDE AND ACETAMINOPHEN 500 MG: 500 TABLET ORAL at 17:26

## 2024-02-11 RX ADMIN — FUROSEMIDE 60 MG: 20 TABLET ORAL at 05:25

## 2024-02-11 RX ADMIN — INSULIN GLARGINE-YFGN 15 UNITS: 100 INJECTION, SOLUTION SUBCUTANEOUS at 21:18

## 2024-02-11 RX ADMIN — INSULIN HUMAN 7 UNITS: 100 INJECTION, SOLUTION PARENTERAL at 21:18

## 2024-02-11 RX ADMIN — CHOLESTYRAMINE 4 G: 4 POWDER, FOR SUSPENSION ORAL at 17:00

## 2024-02-11 RX ADMIN — Medication 220 MG: at 15:53

## 2024-02-11 RX ADMIN — OMEPRAZOLE 20 MG: 20 CAPSULE, DELAYED RELEASE ORAL at 09:04

## 2024-02-11 RX ADMIN — OXYCODONE HYDROCHLORIDE AND ACETAMINOPHEN 500 MG: 500 TABLET ORAL at 05:25

## 2024-02-11 RX ADMIN — CALCIUM 500 MG: 500 TABLET ORAL at 17:27

## 2024-02-11 ASSESSMENT — PAIN DESCRIPTION - PAIN TYPE: TYPE: ACUTE PAIN

## 2024-02-11 ASSESSMENT — GAIT ASSESSMENTS: GAIT LEVEL OF ASSIST: UNABLE TO PARTICIPATE

## 2024-02-11 NOTE — THERAPY
Occupational Therapy  Daily Treatment     Patient Name: Beni Moore  Age:  53 y.o., Sex:  male  Medical Record #: [id number]  Today's Date: 2/11/2024     Precautions  Precautions: Fall Risk, Non Weight Bearing Left Lower Extremity, Immobilizer Left Lower Extremity  Comments: ARCENIO LYNN         Subjective  Patient seated in w/c upon arrival, pleasant and agreeable to OT session. Pt endorses that his goal is to decrease burden on his parents.      Objective     02/11/24 0931   OT Charge Group   OT Self Care / ADL (Units) 2   OT Therapeutic Exercise (Units) 2   OT Total Time Spent   OT Individual Total Time Spent (Mins) 60   Interdisciplinary Plan of Care Collaboration   IDT Collaboration with  Family / Caregiver   Patient Position at End of Therapy Seated;Family / Friend in Room   Collaboration Comments pt's parents present and engaged throughout session   Occupational Therapist Assigned   Assigned OT / Treatment Time / Comments Kenyetta; 30/60 minutes please     Pt provided with bariatric drop arm commode to support independence and safety with slideboard txfr to toilet. Provided with visual/verbal description. Height of leg rests adjusted to support safety. Patient did not trial txfr at this time.     Patient completed x5 STS in // bars with SBA-min A for STS with significant reliance on UE support. He endorsed no LUE pain during these attempts.     Assessment  Patient had good tolerance to session. He is progressing with his STS independence and safety in // bars. He demonstrates excellent motivation to progress as well as overall safety awareness, insight.     Strengths: Able to follow instructions, Alert and oriented, Effective communication skills, Good endurance, Good insight into deficits/needs, Independent prior level of function, Motivated for self care and independence, Pleasant and cooperative, Supportive family, Willingly participates in therapeutic activities  Barriers: Decreased endurance, Fatigue,  Generalized weakness, Impaired activity tolerance, Impaired balance, Limited mobility    Plan  Shower tomorrow (2/12)  Fxl txfrs   UE strengthening     Patient plans to d/c to parent's house with tub/shower combo, tub txfr bench, Gbs, handheld shower head, and standard commode.     DME  OT DME Recommendations  Bathroom Equipment:  (Grab bars in shower)    Passport items to be completed:  Perform bathroom transfers, complete dressing, complete feeding, get ready for the day, prepare a simple meal, participate in household tasks, adapt home for safety needs, demonstrate home exercise program, complete caregiver training     Occupational Therapy Goals (Active)       Problem: Bathing       Dates: Start:  02/09/24         Goal: STG-Within one week, patient will bathe w/ CGA and AE/DME as needed       Dates: Start:  02/09/24               Problem: Dressing       Dates: Start:  02/09/24         Goal: STG-Within one week, patient will dress LB w/ min A and AE as needed       Dates: Start:  02/09/24               Problem: Functional Transfers       Dates: Start:  02/09/24         Goal: STG-Within one week, patient will consistently transfer w/ CGA and DME as needed       Dates: Start:  02/09/24               Problem: IADL's       Dates: Start:  02/09/24         Goal: STG-Within one week, patient will access kitchen area w/ min A and LRAD       Dates: Start:  02/09/24               Problem: OT Long Term Goals       Dates: Start:  02/09/24         Goal: LTG-By discharge, patient will complete basic self care tasks w/ mod I/supervision and AE/DME as needed       Dates: Start:  02/09/24            Goal: LTG-By discharge, patient will complete basic home management w/ supervision and LRAD       Dates: Start:  02/09/24               Problem: Toileting       Dates: Start:  02/09/24         Goal: STG-Within one week, patient will complete toileting tasks w/ min A and AE/DME as needed       Dates: Start:  02/09/24

## 2024-02-11 NOTE — CARE PLAN
The patient is Stable - Low risk of patient condition declining or worsening    Shift Goals  Clinical Goals: Monitor BG, wound care  Patient Goals: Strengthen    Progress made toward(s) clinical / shift goals:  B/158, wound care completed.      Problem: Hemodynamics  Goal: Patient's hemodynamics, fluid balance and neurologic status will be stable or improve  Outcome: Progressing  Note:    Latest Reference Range & Units 02/10/24 06:16   Sodium 135 - 145 mmol/L 131 (L)   Potassium 3.6 - 5.5 mmol/L 4.1   Chloride 96 - 112 mmol/L 102   Co2 20 - 33 mmol/L 22   Anion Gap 7.0 - 16.0  7.0   Glucose 65 - 99 mg/dL 120 (H)   Bun 8 - 22 mg/dL 32 (H)   Creatinine 0.50 - 1.40 mg/dL 2.57 (H)   GFR (CKD-EPI) >60 mL/min/1.73 m 2 29 !   Calcium 8.5 - 10.5 mg/dL 7.0 (L)      Problem: Skin Integrity - Diabetes  Goal: Patient's skin on legs and feet will remain intact while hospitalized  Outcome: Progressing  Note: Left BKA dressing changed, stump appears pink/red - no change from  - scant drainage, incision site is approximated and sutured, immobilizer worn ATC.     Problem: Diabetes Management  Goal: Patient will achieve and maintain glucose in satisfactory range  Outcome: Progressing  Note: BG levels monitored and charted, insulin administered as ordered.

## 2024-02-11 NOTE — PROGRESS NOTES
NURSING DAILY NOTE    Name: Beni Moore   Date of Admission: 2/8/2024   Admitting Diagnosis: Hx of BKA, left (HCC)  Attending Physician: Mena Ny D.o.  Allergies: Patient has no known allergies.    Safety  Patient Assist  mod a  Patient Precautions  Fall Risk, Non Weight Bearing Left Upper Extremity, Immobilizer Left Lower Extremity  Precaution Comments  L BKA  Bed Transfer Status  Contact Guard Assist  Toilet Transfer Status   Minimal Assist  Assistive Devices  Wheelchair  Oxygen  None - Room Air  Diet/Therapeutic Dining  Current Diet Order   Procedures    Diet Order Diet: Consistent CHO (Diabetic); Second Modifier: (optional): Renal     Pill Administration  whole  Agitated Behavioral Scale     ABS Level of Severity       Fall Risk  Has the patient had a fall this admission?   No  Florecita Roldan Fall Risk Scoring  20, HIGH RISK  Fall Risk Safety Measures  poor balance and low vision/ hearing    Vitals  Temperature: 36.6 °C (97.8 °F)  Temp src: Oral  Pulse: 74  Respiration: 17  Blood Pressure: 135/79  Blood Pressure MAP (Calculated): 98 MM HG  BP Location: Left, Upper Arm  Patient BP Position: Sitting     Oxygen  Pulse Oximetry: 98 %  O2 (LPM): 0  O2 Delivery Device: None - Room Air    Bowel and Bladder  Last Bowel Movement   (Patient refuses bowel medications)  Stool Type     Bowel Device     Continent  Bladder: Continent void   Bowel: Continent movement  Bladder Function  Urine Void (mL): 250 ml  Number of Times Voided: 1  Urine Color: Unable To Evaluate  Genitourinary Assessment   Bladder Assessment (WDL):  WDL Except  Urine Color: Unable To Evaluate  Time Void: Yes  Bladder Scan: Post Void  $ Bladder Scan Results (mL): 78    Skin  Johnny Score   19  Sensory Interventions   Bed Types: Standard/Trauma Mattress  Skin Preventative Measures: Pillows in Use to Float Heels, Pillows in Use for Support / Positioning  Moisture Interventions          Pain  Pain Rating Scale  0 - No Pain  Pain Location  Shoulder  Pain Location Orientation  Left  Pain Interventions   Declines    ADLs    Bathing   Patient Refused Bathing  Linen Change      Personal Hygiene     Chlorhexidine Bath      Oral Care     Teeth/Dentures     Shave     Nutrition Percentage Eaten  *  * Meal *  *, Dinner, Between % Consumed  Environmental Precautions     Patient Turns/Positioning  Patient Turns Self from Side to Side  Patient Turns Assistance/Tolerance     Bed Positions     Head of Bed Elevated         Psychosocial/Neurologic Assessment  Psychosocial Assessment  Psychosocial (WDL):  Within Defined Limits  Neurologic Assessment  Neuro (WDL): Within Defined Limits  Level of Consciousness: Alert  EENT (WDL):  Within Defined Limits    Cardio/Pulmonary Assessment  Edema      Respiratory Breath Sounds  RUL Breath Sounds: Clear  RML Breath Sounds: Clear  RLL Breath Sounds: Clear  CHRIS Breath Sounds: Clear  LLL Breath Sounds: Clear  Cardiac Assessment   Cardiac (WDL):  WDL Except (HX: HTN)

## 2024-02-11 NOTE — THERAPY
Physical Therapy   Daily Treatment     Patient Name: Beni Moore  Age:  53 y.o., Sex:  male  Medical Record #: [id number]  Today's Date: 2/11/2024     Precautions  Precautions: (P) Fall Risk, Non Weight Bearing Left Lower Extremity, Immobilizer Left Lower Extremity  Comments: (P) L BKA    Subjective    I am very tired. I did not sleep well. I can't sleep on my back. My shoulder is sore from yesterday.     Objective       02/11/24 0701   PT Charge Group   PT Therapeutic Exercise (Units) 3   PT Therapeutic Activities (Units) 1   PT Total Time Spent   PT Individual Total Time Spent (Mins) 60   Precautions   Precautions Fall Risk;Non Weight Bearing Left Lower Extremity;Immobilizer Left Lower Extremity   Comments L BKA   Pain   Intervention Declines   Cognition    Level of Consciousness Alert   ABS (Agitated Behavior Scale)   Agitated Behavior Scale Performed No   Sleep/Wake Cycle   Sleep & Rest Asleep   Sleep Observations Alert upon awakening   Gait Functional Level of Assist    Gait Level Of Assist Unable to Participate   Wheelchair Functional Level of Assist   Wheelchair Assist Supervised   Distance Wheelchair (Feet or Distance) 250   Wheelchair Description Supervision for safety   Stairs Functional Level of Assist   Level of Assist with Stairs Unable to Participate   Transfer Functional Level of Assist   Bed, Chair, Wheelchair Transfer Contact Guard Assist   Bed Chair Wheelchair Transfer Description Initial preparation for task;Slideboard transfer from bed to wheelchair;Set-up of equipment;Supervision for safety   Supine Lower Body Exercise   Hip Abduction 2 sets of 10;Side Lying;Bilateral;Hook Lying   Hip Adduction  2 sets of 10;Bilateral   Straight Leg Raises 2 sets of 10;Back ;Front;Abduction;Bilateral   Gluteal Isometrics 3 sets of 10  (supine and prone)   Quadriceps Isometrics 2 sets of 10;Bilateral   Other Exercises prone stretching x 10 minutes with bilateral hamstring curls, hip extension, glut sets, cobra  stretch   Sitting Lower Body Exercises   Long Arc Quad 1 set of 10;Right   Bed Mobility    Supine to Sit Independent   Sit to Supine Independent   Scooting Independent   Rolling Independent   Interdisciplinary Plan of Care Collaboration   IDT Collaboration with  Nursing   Patient Position at End of Therapy Seated;Other (Comments)   Collaboration Comments transfer to cafeteria for breakfast         Assessment    Patient able to perform bed mobility and multiple transfers with slideboard safely between bed<>WC, and WC<>mat table. Needs occasional assist to remove SB. Good endurance demonstrated with supine HEP. Emphasized prone positioning for hip flexor stretch. Patient wants to try sleeping on stomach again- his normal position. Checked skin on R foot- intact, no redness. Education on need to monitor skin closely, wear shoes when home to protect foot at all times. Patient very compliant with diabetes precautions and monitoring BS.    Strengths: Able to follow instructions, Alert and oriented, Effective communication skills, Good carryover of learning, Good insight into deficits/needs, Independent prior level of function, Motivated for self care and independence, Pleasant and cooperative, Supportive family, Willingly participates in therapeutic activities  Barriers: Fatigue, Generalized weakness, Impaired activity tolerance, Impaired balance, Pain    Plan    Continue with mat program, emphasize prone positioning for stretching, transfer training from various heights, car transfer, WC mobility outdoors, standing with FWW.    DME  PT DME Recommendations  Wheelchair:  (Pt already has his own WC and slideboard)  Assistive Device: Front Wheeled Walker    Passport items to be completed:   in/out of a vehicle, safely use mobility device, navigate up and down stairs, show how to get up/down from the ground, ensure home is accessible, demonstrate HEP, complete caregiver training    Physical Therapy Problems (Active)        Problem: Balance       Dates: Start:  02/09/24         Goal: STG-Within one week, patient will maintain static standing in parallel bars Gina with single UE support for 10 seconds in order to facilitate completion of standing ADLs       Dates: Start:  02/09/24               Problem: Mobility       Dates: Start:  02/09/24         Goal: STG-Within one week, patient will propel wheelchair community distances of at least 150' independently       Dates: Start:  02/09/24               Problem: Mobility Transfers       Dates: Start:  02/09/24         Goal: STG-Within one week, patient will sit to stand from WC Gina with UE pushing from WC        Dates: Start:  02/09/24            Goal: STG-Within one week, patient will transfer bed to chair CGA        Dates: Start:  02/09/24               Problem: PT-Long Term Goals       Dates: Start:  02/09/24         Goal: LTG-By discharge, patient will tolerate standing with intermittent UE support for at least 1 minute CGA in order to facilitate standing ADLs        Dates: Start:  02/09/24            Goal: LTG-By discharge, patient will perform squat pivot transfer from one surface to another SPV       Dates: Start:  02/09/24            Goal: LTG-By discharge, patient will perform home exercise program independently        Dates: Start:  02/09/24            Goal: LTG-By discharge, patient will transfer in/out of a car using LRAD or no AD CGA       Dates: Start:  02/09/24

## 2024-02-11 NOTE — PROGRESS NOTES
NURSING DAILY NOTE    Name: Beni Moore   Date of Admission: 2/8/2024   Admitting Diagnosis: Hx of BKA, left (HCC)  Attending Physician: Mena Ny D.o.  Allergies: Patient has no known allergies.    Safety  Patient Assist  mod a  Patient Precautions  Fall Risk, Non Weight Bearing Left Upper Extremity, Immobilizer Left Lower Extremity  Precaution Comments  L BKA  Bed Transfer Status  Contact Guard Assist  Toilet Transfer Status   Minimal Assist  Assistive Devices  Wheelchair  Oxygen  None - Room Air  Diet/Therapeutic Dining  Current Diet Order   Procedures    Diet Order Diet: Consistent CHO (Diabetic); Second Modifier: (optional): Renal     Pill Administration  whole  Agitated Behavioral Scale     ABS Level of Severity       Fall Risk  Has the patient had a fall this admission?   No  Florecita Roldan Fall Risk Scoring  20, HIGH RISK  Fall Risk Safety Measures  poor balance and low vision/ hearing    Vitals  Temperature: 36.6 °C (97.8 °F)  Temp src: Oral  Pulse: 74  Respiration: 17  Blood Pressure: 135/79  Blood Pressure MAP (Calculated): 98 MM HG  BP Location: Left, Upper Arm  Patient BP Position: Sitting     Oxygen  Pulse Oximetry: 98 %  O2 (LPM): 0  O2 Delivery Device: None - Room Air    Bowel and Bladder  Last Bowel Movement   (Patient refuses bowel medications)  Stool Type     Bowel Device     Continent  Bladder: Continent void   Bowel: Continent movement  Bladder Function  Urine Void (mL): 800 ml  Number of Times Voided: 2  Urine Color: Unable To Evaluate  Genitourinary Assessment   Bladder Assessment (WDL):  WDL Except  Urine Color: Unable To Evaluate  Time Void: Yes  Bladder Scan: Post Void  $ Bladder Scan Results (mL): 78    Skin  Johnny Score   19  Sensory Interventions   Bed Types: Standard/Trauma Mattress  Skin Preventative Measures: Pillows in Use for Support / Positioning  Moisture Interventions         Pain  Pain Rating Scale  0 - No  Pain  Pain Location  Shoulder  Pain Location Orientation  Left  Pain Interventions   Declines    ADLs    Bathing   Patient Refused Bathing  Linen Change      Personal Hygiene     Chlorhexidine Bath      Oral Care     Teeth/Dentures     Shave     Nutrition Percentage Eaten  *  * Meal *  *, Dinner, Between % Consumed  Environmental Precautions     Patient Turns/Positioning  Patient Turns Self from Side to Side  Patient Turns Assistance/Tolerance     Bed Positions     Head of Bed Elevated         Psychosocial/Neurologic Assessment  Psychosocial Assessment  Psychosocial (WDL):  Within Defined Limits  Neurologic Assessment  Neuro (WDL): Within Defined Limits  Level of Consciousness: Alert  EENT (WDL):  Within Defined Limits    Cardio/Pulmonary Assessment  Edema      Respiratory Breath Sounds  RUL Breath Sounds: Clear  RML Breath Sounds: Clear  RLL Breath Sounds: Clear  CHRIS Breath Sounds: Clear  LLL Breath Sounds: Clear  Cardiac Assessment   Cardiac (WDL):  WDL Except (HX: HTN)

## 2024-02-11 NOTE — CARE PLAN
The patient is Watcher - Medium risk of patient condition declining or worsening    Shift Goals  Clinical Goals: Monitor BG, wound care  Patient Goals: Strengthen    Progress made toward(s) clinical / shift goals:    Problem: Knowledge Deficit - Standard  Goal: Patient and family/care givers will demonstrate understanding of plan of care, disease process/condition, diagnostic tests and medications  Outcome: Progressing     Problem: Hemodynamics  Goal: Patient's hemodynamics, fluid balance and neurologic status will be stable or improve  Note: Completed the 24 hour urine collection, sent to lab. Afebrile. HD held for now.      Problem: Infection  Goal: Patient will remain free from infection  Note: Patient remains free from s/s infection; afebrile.  Will continue to monitor.

## 2024-02-12 ENCOUNTER — APPOINTMENT (OUTPATIENT)
Dept: PHYSICAL THERAPY | Facility: REHABILITATION | Age: 54
DRG: 559 | End: 2024-02-12
Attending: PHYSICAL MEDICINE & REHABILITATION
Payer: MEDICARE

## 2024-02-12 ENCOUNTER — APPOINTMENT (OUTPATIENT)
Dept: OCCUPATIONAL THERAPY | Facility: REHABILITATION | Age: 54
DRG: 559 | End: 2024-02-12
Attending: PHYSICAL MEDICINE & REHABILITATION
Payer: MEDICARE

## 2024-02-12 LAB
ANION GAP SERPL CALC-SCNC: 9 MMOL/L (ref 7–16)
BUN SERPL-MCNC: 39 MG/DL (ref 8–22)
CALCIUM SERPL-MCNC: 7 MG/DL (ref 8.5–10.5)
CHLORIDE SERPL-SCNC: 103 MMOL/L (ref 96–112)
CO2 SERPL-SCNC: 21 MMOL/L (ref 20–33)
CREAT SERPL-MCNC: 2.83 MG/DL (ref 0.5–1.4)
ERYTHROCYTE [DISTWIDTH] IN BLOOD BY AUTOMATED COUNT: 45.1 FL (ref 35.9–50)
GFR SERPLBLD CREATININE-BSD FMLA CKD-EPI: 25 ML/MIN/1.73 M 2
GLUCOSE BLD STRIP.AUTO-MCNC: 100 MG/DL (ref 65–99)
GLUCOSE BLD STRIP.AUTO-MCNC: 125 MG/DL (ref 65–99)
GLUCOSE BLD STRIP.AUTO-MCNC: 151 MG/DL (ref 65–99)
GLUCOSE BLD STRIP.AUTO-MCNC: 206 MG/DL (ref 65–99)
GLUCOSE SERPL-MCNC: 89 MG/DL (ref 65–99)
HCT VFR BLD AUTO: 26.8 % (ref 42–52)
HGB BLD-MCNC: 8.8 G/DL (ref 14–18)
MCH RBC QN AUTO: 28.8 PG (ref 27–33)
MCHC RBC AUTO-ENTMCNC: 32.8 G/DL (ref 32.3–36.5)
MCV RBC AUTO: 87.6 FL (ref 81.4–97.8)
PLATELET # BLD AUTO: 196 K/UL (ref 164–446)
PMV BLD AUTO: 9.7 FL (ref 9–12.9)
POTASSIUM SERPL-SCNC: 4.6 MMOL/L (ref 3.6–5.5)
RBC # BLD AUTO: 3.06 M/UL (ref 4.7–6.1)
SODIUM SERPL-SCNC: 133 MMOL/L (ref 135–145)
WBC # BLD AUTO: 6 K/UL (ref 4.8–10.8)

## 2024-02-12 PROCEDURE — 700111 HCHG RX REV CODE 636 W/ 250 OVERRIDE (IP): Performed by: PHYSICAL MEDICINE & REHABILITATION

## 2024-02-12 PROCEDURE — 85027 COMPLETE CBC AUTOMATED: CPT

## 2024-02-12 PROCEDURE — 99232 SBSQ HOSP IP/OBS MODERATE 35: CPT | Performed by: PHYSICAL MEDICINE & REHABILITATION

## 2024-02-12 PROCEDURE — A9270 NON-COVERED ITEM OR SERVICE: HCPCS | Performed by: PHYSICAL MEDICINE & REHABILITATION

## 2024-02-12 PROCEDURE — 82962 GLUCOSE BLOOD TEST: CPT | Mod: 91

## 2024-02-12 PROCEDURE — 97535 SELF CARE MNGMENT TRAINING: CPT

## 2024-02-12 PROCEDURE — 700102 HCHG RX REV CODE 250 W/ 637 OVERRIDE(OP): Performed by: PHYSICAL MEDICINE & REHABILITATION

## 2024-02-12 PROCEDURE — 770010 HCHG ROOM/CARE - REHAB SEMI PRIVAT*

## 2024-02-12 PROCEDURE — 99232 SBSQ HOSP IP/OBS MODERATE 35: CPT | Performed by: INTERNAL MEDICINE

## 2024-02-12 PROCEDURE — 700102 HCHG RX REV CODE 250 W/ 637 OVERRIDE(OP): Performed by: INTERNAL MEDICINE

## 2024-02-12 PROCEDURE — A9270 NON-COVERED ITEM OR SERVICE: HCPCS | Performed by: INTERNAL MEDICINE

## 2024-02-12 PROCEDURE — 97110 THERAPEUTIC EXERCISES: CPT

## 2024-02-12 PROCEDURE — 80048 BASIC METABOLIC PNL TOTAL CA: CPT

## 2024-02-12 RX ORDER — AMLODIPINE BESYLATE 5 MG/1
5 TABLET ORAL
Status: DISCONTINUED | OUTPATIENT
Start: 2024-02-12 | End: 2024-02-19

## 2024-02-12 RX ADMIN — FUROSEMIDE 60 MG: 20 TABLET ORAL at 05:10

## 2024-02-12 RX ADMIN — HEPARIN SODIUM 5000 UNITS: 5000 INJECTION, SOLUTION INTRAVENOUS; SUBCUTANEOUS at 20:52

## 2024-02-12 RX ADMIN — ASPIRIN 81 MG: 81 TABLET, COATED ORAL at 05:10

## 2024-02-12 RX ADMIN — Medication 1 TABLET: at 14:39

## 2024-02-12 RX ADMIN — OXYCODONE HYDROCHLORIDE AND ACETAMINOPHEN 500 MG: 500 TABLET ORAL at 17:00

## 2024-02-12 RX ADMIN — INSULIN HUMAN 4 UNITS: 100 INJECTION, SOLUTION PARENTERAL at 21:00

## 2024-02-12 RX ADMIN — FUROSEMIDE 60 MG: 20 TABLET ORAL at 15:46

## 2024-02-12 RX ADMIN — INSULIN HUMAN 3 UNITS: 100 INJECTION, SOLUTION PARENTERAL at 17:02

## 2024-02-12 RX ADMIN — OXYCODONE HYDROCHLORIDE AND ACETAMINOPHEN 500 MG: 500 TABLET ORAL at 05:10

## 2024-02-12 RX ADMIN — Medication 220 MG: at 14:39

## 2024-02-12 RX ADMIN — OMEPRAZOLE 20 MG: 20 CAPSULE, DELAYED RELEASE ORAL at 08:06

## 2024-02-12 RX ADMIN — CHOLESTYRAMINE 4 G: 4 POWDER, FOR SUSPENSION ORAL at 10:39

## 2024-02-12 RX ADMIN — CALCIUM 500 MG: 500 TABLET ORAL at 08:06

## 2024-02-12 RX ADMIN — HEPARIN SODIUM 5000 UNITS: 5000 INJECTION, SOLUTION INTRAVENOUS; SUBCUTANEOUS at 05:10

## 2024-02-12 RX ADMIN — HEPARIN SODIUM 5000 UNITS: 5000 INJECTION, SOLUTION INTRAVENOUS; SUBCUTANEOUS at 14:00

## 2024-02-12 RX ADMIN — INSULIN GLARGINE-YFGN 15 UNITS: 100 INJECTION, SOLUTION SUBCUTANEOUS at 21:05

## 2024-02-12 RX ADMIN — CALCIUM 500 MG: 500 TABLET ORAL at 17:00

## 2024-02-12 RX ADMIN — CHOLESTYRAMINE 4 G: 4 POWDER, FOR SUSPENSION ORAL at 17:00

## 2024-02-12 RX ADMIN — AMLODIPINE BESYLATE 5 MG: 5 TABLET ORAL at 12:52

## 2024-02-12 RX ADMIN — ATORVASTATIN CALCIUM 80 MG: 40 TABLET, FILM COATED ORAL at 20:50

## 2024-02-12 ASSESSMENT — ENCOUNTER SYMPTOMS
COUGH: 0
WEIGHT LOSS: 0
DIARRHEA: 0
WHEEZING: 0
VOMITING: 0
EYES NEGATIVE: 1
FLANK PAIN: 0
ORTHOPNEA: 0
SINUS PAIN: 0
SHORTNESS OF BREATH: 0
FEVER: 0
NAUSEA: 0
PALPITATIONS: 0
HEMOPTYSIS: 0
ABDOMINAL PAIN: 0
CHILLS: 0

## 2024-02-12 ASSESSMENT — ACTIVITIES OF DAILY LIVING (ADL)
BED_CHAIR_WHEELCHAIR_TRANSFER_DESCRIPTION: SLIDEBOARD TRANSFER FROM BED TO WHEELCHAIR;SUPERVISION FOR SAFETY;VERBAL CUEING
TUB_SHOWER_TRANSFER_DESCRIPTION: GRAB BAR;VERBAL CUEING;SUPERVISION FOR SAFETY;INCREASED TIME
BED_CHAIR_WHEELCHAIR_TRANSFER_DESCRIPTION: ADAPTIVE EQUIPMENT;INCREASED TIME;SLIDEBOARD TRANSFER FROM BED TO WHEELCHAIR;SUPERVISION FOR SAFETY;VERBAL CUEING

## 2024-02-12 NOTE — THERAPY
Physical Therapy   Daily Treatment     Patient Name: Beni Moore  Age:  53 y.o., Sex:  male  Medical Record #: [id number]  Today's Date: 2/12/2024     Precautions  Precautions: Fall Risk, Non Weight Bearing Left Lower Extremity, Immobilizer Left Lower Extremity  Comments: L LUZA    Subjective    Pt up in wc, willing to participate.      Objective       02/12/24 0931   PT Charge Group   PT Therapeutic Exercise (Units) 2   PT Total Time Spent   PT Individual Total Time Spent (Mins) 30   Transfer Functional Level of Assist   Bed, Chair, Wheelchair Transfer Standby Assist   Bed Chair Wheelchair Transfer Description Adaptive equipment;Increased time;Slideboard transfer from bed to wheelchair;Supervision for safety;Verbal cueing   Supine Lower Body Exercise   Bridges 3 sets of 10  (L BKA on half bolster wedge)   Knee to Chest 3 sets of 10   Gluteal Isometrics 3 sets of 10   Other Exercises prone lying x 10 minutes, hamstring curls 3 x 10.   Bed Mobility    Supine to Sit Independent   Sit to Supine Independent   Scooting Independent   Rolling Independent         Assessment    Pt tolerated LE strength and flexibility training well, minimal to no reports of pain, limited by generalized edema and peripheral neuropathy BLE's.     Strengths: Able to follow instructions, Alert and oriented, Effective communication skills, Good carryover of learning, Good insight into deficits/needs, Independent prior level of function, Motivated for self care and independence, Pleasant and cooperative, Supportive family, Willingly participates in therapeutic activities  Barriers: Fatigue, Generalized weakness, Impaired activity tolerance, Impaired balance, Pain    Plan    Continue with mat program, emphasize prone positioning for stretching, transfer training from various heights, car transfer, WC mobility outdoors, standing with FWW.     DME  PT DME Recommendations  Wheelchair:  (Pt already has his own WC and slideboard)  Assistive Device:  Front Wheeled Walker    Passport items to be completed:  Get in/out of bed safely, in/out of a vehicle, safely use mobility device, walk or wheel around home/community, navigate up and down stairs, show how to get up/down from the ground, ensure home is accessible, demonstrate HEP, complete caregiver training    Physical Therapy Problems (Active)       Problem: Balance       Dates: Start:  02/09/24         Goal: STG-Within one week, patient will maintain static standing in parallel bars Gina with single UE support for 10 seconds in order to facilitate completion of standing ADLs       Dates: Start:  02/09/24               Problem: Mobility       Dates: Start:  02/09/24         Goal: STG-Within one week, patient will propel wheelchair community distances of at least 150' independently       Dates: Start:  02/09/24               Problem: Mobility Transfers       Dates: Start:  02/09/24         Goal: STG-Within one week, patient will sit to stand from WC Gina with UE pushing from WC        Dates: Start:  02/09/24            Goal: STG-Within one week, patient will transfer bed to chair CGA        Dates: Start:  02/09/24               Problem: PT-Long Term Goals       Dates: Start:  02/09/24         Goal: LTG-By discharge, patient will tolerate standing with intermittent UE support for at least 1 minute CGA in order to facilitate standing ADLs        Dates: Start:  02/09/24            Goal: LTG-By discharge, patient will perform squat pivot transfer from one surface to another SPV       Dates: Start:  02/09/24            Goal: LTG-By discharge, patient will perform home exercise program independently        Dates: Start:  02/09/24            Goal: LTG-By discharge, patient will transfer in/out of a car using LRAD or no AD CGA       Dates: Start:  02/09/24

## 2024-02-12 NOTE — THERAPY
Physical Therapy   Daily Treatment     Patient Name: Beni Moore  Age:  53 y.o., Sex:  male  Medical Record #: [id number]  Today's Date: 2/12/2024     Precautions  Precautions: Fall Risk, Non Weight Bearing Left Lower Extremity, Immobilizer Left Lower Extremity  Comments: ARCENIO LYNN    Subjective    Patient prone on mat table when approached, finishing up previous PT session. Motivated and agreeable to participate.      Objective       02/12/24 1001   PT Charge Group   PT Therapeutic Exercise (Units) 2   PT Total Time Spent   PT Individual Total Time Spent (Mins) 30   Wheelchair Functional Level of Assist   Wheelchair Assist Supervised   Distance Wheelchair (Feet or Distance) 200   Wheelchair Description Limited by fatigue;Supervision for safety   Transfer Functional Level of Assist   Bed, Chair, Wheelchair Transfer Standby Assist   Bed Chair Wheelchair Transfer Description Slideboard transfer from bed to wheelchair;Supervision for safety;Verbal cueing   Supine Lower Body Exercise   Hip Abduction Side Lying;3 sets of 10  (L LE)   Straight Leg Raises 3 sets of 15;Left   Other Exercises Prone lying x 5 minutes   Bed Mobility    Supine to Sit Independent   Rolling Independent   Interdisciplinary Plan of Care Collaboration   IDT Collaboration with  Physical Therapist   Patient Position at End of Therapy Seated;Call Light within Reach;Tray Table within Reach;Phone within Reach   Collaboration Comments Handoff from primary PT in therapy gym         Assessment    Patient with good tolerance to activity, no reports of increased pain. Patient with appropriate set-up of slide board transfer, with minimal cuing for position of slide board prior to transfer. Limited endurance with WC mobility due to shoulder pain and weakness. Patient very motivated to participate.     Strengths: Able to follow instructions, Alert and oriented, Effective communication skills, Good carryover of learning, Good insight into deficits/needs, Independent  prior level of function, Motivated for self care and independence, Pleasant and cooperative, Supportive family, Willingly participates in therapeutic activities  Barriers: Fatigue, Generalized weakness, Impaired activity tolerance, Impaired balance, Pain    Plan    Continue with mat program, emphasize prone positioning for stretching, transfer training from various heights, car transfer, WC mobility outdoors, standing with FWW.      DME  PT DME Recommendations  Wheelchair:  (Pt already has his own WC and slideboard)  Assistive Device: Front Wheeled Walker    Passport items to be completed:  Get in/out of bed safely, in/out of a vehicle, safely use mobility device, walk or wheel around home/community, navigate up and down stairs, show how to get up/down from the ground, ensure home is accessible, demonstrate HEP, complete caregiver training    Physical Therapy Problems (Active)       Problem: Balance       Dates: Start:  02/09/24         Goal: STG-Within one week, patient will maintain static standing in parallel bars Gina with single UE support for 10 seconds in order to facilitate completion of standing ADLs       Dates: Start:  02/09/24               Problem: Mobility       Dates: Start:  02/09/24         Goal: STG-Within one week, patient will propel wheelchair community distances of at least 150' independently       Dates: Start:  02/09/24               Problem: Mobility Transfers       Dates: Start:  02/09/24         Goal: STG-Within one week, patient will sit to stand from WC Gina with UE pushing from WC        Dates: Start:  02/09/24            Goal: STG-Within one week, patient will transfer bed to chair CGA        Dates: Start:  02/09/24               Problem: PT-Long Term Goals       Dates: Start:  02/09/24         Goal: LTG-By discharge, patient will tolerate standing with intermittent UE support for at least 1 minute CGA in order to facilitate standing ADLs        Dates: Start:  02/09/24            Goal:  LTG-By discharge, patient will perform squat pivot transfer from one surface to another SPV       Dates: Start:  02/09/24            Goal: LTG-By discharge, patient will perform home exercise program independently        Dates: Start:  02/09/24            Goal: LTG-By discharge, patient will transfer in/out of a car using LRAD or no AD CGA       Dates: Start:  02/09/24

## 2024-02-12 NOTE — THERAPY
Occupational Therapy  Daily Treatment     Patient Name: Beni Moore  Age:  53 y.o., Sex:  male  Medical Record #: [id number]  Today's Date: 2/12/2024     Precautions  Precautions: Fall Risk, Non Weight Bearing Left Lower Extremity, Immobilizer Left Lower Extremity  Comments: ARCENIO LYNN         Subjective  Patient supine in bed upon arrival, agreeable to OT session.      Objective     02/12/24 0701   OT Charge Group   OT Self Care / ADL (Units) 4   OT Total Time Spent   OT Individual Total Time Spent (Mins) 60   Functional Level of Assist   Bathing Supervision   Bathing Description Verbal cueing;Supervision for safety   Upper Body Dressing Supervision   Upper Body Dressing Description Verbal cueing;Supervision for safety   Lower Body Dressing Moderate Assist   Lower Body Dressing Description Grab bar;Increased time;Set-up of equipment;Verbal cueing   Bed, Chair, Wheelchair Transfer Standby Assist  (slide board EOB > w/c)   Tub / Shower Transfers Minimal Assist  (CGA-min A slideboard txfr shower bench <> w/c; assist for placing slide board)   Tub Shower Transfer Description Grab bar;Verbal cueing;Supervision for safety;Increased time   Interdisciplinary Plan of Care Collaboration   Patient Position at End of Therapy Seated  (in dining room for breakfast)     Pt provided with long handled sponge to facilitate independence and safety with shower due to decreased functional reach.     Assessment  Patient had good tolerance to session. The primary deficit of his morning routine is lower body dressing due to bulky LE immobilizer, BLE swelling, decreased functional reach, and decreased BLE mobility. He may benefit from reacher to facilitate independence and safety with LB dressing.     Strengths: Able to follow instructions, Alert and oriented, Effective communication skills, Good endurance, Good insight into deficits/needs, Independent prior level of function, Motivated for self care and independence, Pleasant and cooperative,  Supportive family, Willingly participates in therapeutic activities  Barriers: Decreased endurance, Fatigue, Generalized weakness, Impaired activity tolerance, Impaired balance, Limited mobility    Plan  Fxl txfrs   UE strengthening      Patient plans to d/c to parent's house with tub/shower combo, tub txfr bench, Gbs, handheld shower head, and standard commode.     DME  OT DME Recommendations  Bathroom Equipment:  (Grab bars in shower)    Passport items to be completed:  Perform bathroom transfers, complete dressing, complete feeding, get ready for the day, prepare a simple meal, participate in household tasks, adapt home for safety needs, demonstrate home exercise program, complete caregiver training     Occupational Therapy Goals (Active)       Problem: Bathing       Dates: Start:  02/09/24         Goal: STG-Within one week, patient will bathe w/ CGA and AE/DME as needed       Dates: Start:  02/09/24               Problem: Dressing       Dates: Start:  02/09/24         Goal: STG-Within one week, patient will dress LB w/ min A and AE as needed       Dates: Start:  02/09/24               Problem: Functional Transfers       Dates: Start:  02/09/24         Goal: STG-Within one week, patient will consistently transfer w/ CGA and DME as needed       Dates: Start:  02/09/24               Problem: IADL's       Dates: Start:  02/09/24         Goal: STG-Within one week, patient will access kitchen area w/ min A and LRAD       Dates: Start:  02/09/24               Problem: OT Long Term Goals       Dates: Start:  02/09/24         Goal: LTG-By discharge, patient will complete basic self care tasks w/ mod I/supervision and AE/DME as needed       Dates: Start:  02/09/24            Goal: LTG-By discharge, patient will complete basic home management w/ supervision and LRAD       Dates: Start:  02/09/24               Problem: Toileting       Dates: Start:  02/09/24         Goal: STG-Within one week, patient will complete toileting  tasks w/ min A and AE/DME as needed       Dates: Start:  02/09/24

## 2024-02-12 NOTE — CARE PLAN
"The patient is Watcher - Medium risk of patient condition declining or worsening    Shift Goals  Clinical Goals: Monitor BG, wound care    Problem: Pain - Standard  Goal: Alleviation of pain or a reduction in pain to the patient’s comfort goal  Outcome: Progressing     Problem: Fall Risk - Rehab  Goal: Patient will remain free from falls  Note: Florecita Roldan Fall risk Assessment Score: 22    High fall risk Interventions   - Bed and strip alarm   - Yellow sign by the door   - Yellow wrist band \"Fall risk\"  - Room near to the nurse station  - Do not leave patient unattended in the bathroom  - Fall risk education provided     "

## 2024-02-12 NOTE — CARE PLAN
"The patient is Watcher - Medium risk of patient condition declining or worsening    Shift Goals  Clinical Goals: Monitor BG, wound care  Patient Goals: Strengthen    Problem: Fall Risk - Rehab  Goal: Patient will remain free from falls  Outcome: Progressing    Florecita Roldan Fall risk Assessment Score: 20    High fall risk Interventions   - Alarming seatbelt  - Wander guard  - Bed and strip alarm   - Yellow sign by the door   - Yellow wrist band \"Fall risk\"  - Room near to the nurse station  - Do not leave patient unattended in the bathroom  - Fall risk education provided      Problem: Diabetes Management  Goal: Patient will achieve and maintain glucose in satisfactory range  Outcome: Progressing    Glucose is remaining under 200 today.  Insulin given per MAR.   "

## 2024-02-12 NOTE — PROGRESS NOTES
NURSING DAILY NOTE    Name: Beni Moore   Date of Admission: 2/8/2024   Admitting Diagnosis: Hx of BKA, left (HCC)  Attending Physician: Mena Ny D.o.  Allergies: Patient has no known allergies.    Safety  Patient Assist  mod assist  Patient Precautions  Fall Risk, Non Weight Bearing Left Lower Extremity, Immobilizer Left Lower Extremity  Precaution Comments  L BKA  Bed Transfer Status  Contact Guard Assist  Toilet Transfer Status   Minimal Assist  Assistive Devices  Rails, Slide board  Oxygen  None - Room Air  Diet/Therapeutic Dining  Current Diet Order   Procedures    Diet Order Diet: Consistent CHO (Diabetic); Second Modifier: (optional): Renal     Pill Administration  whole  Agitated Behavioral Scale     ABS Level of Severity       Fall Risk  Has the patient had a fall this admission?   No  Florecita Roldan Fall Risk Scoring  20, HIGH RISK  Fall Risk Safety Measures  bed alarm and chair alarm    Vitals  Temperature: 36.9 °C (98.4 °F)  Temp src: Oral  Pulse: 69  Respiration: 16  Blood Pressure: (!) 157/93 (rn notified)  Blood Pressure MAP (Calculated): 114 MM HG  BP Location: Left, Upper Arm  Patient BP Position: Sitting     Oxygen  Pulse Oximetry: 99 %  O2 (LPM): 0  O2 Delivery Device: None - Room Air    Bowel and Bladder  Last Bowel Movement   (Refused bowel meds despite education.)  Stool Type     Bowel Device     Continent  Bladder: Continent void   Bowel: Continent movement  Bladder Function  Urine Void (mL): 400 ml (urinals)  Number of Times Voided: 1  Urine Color: Yellow  Genitourinary Assessment   Bladder Assessment (WDL):  WDL Except  Urine Color: Yellow  Time Void: Yes  Bladder Scan: Post Void  $ Bladder Scan Results (mL): 78    Skin  Johnny Score   19  Sensory Interventions   Bed Types: Standard/Trauma Mattress  Skin Preventative Measures: Pillows in Use for Support / Positioning  Moisture Interventions         Pain  Pain Rating  Scale  0 - No Pain  Pain Location  Shoulder  Pain Location Orientation  Left  Pain Interventions   Declines    ADLs    Bathing   Patient Refused Bathing  Linen Change      Personal Hygiene     Chlorhexidine Bath      Oral Care     Teeth/Dentures     Shave     Nutrition Percentage Eaten  *  * Meal *  *, Lunch, Between % Consumed  Environmental Precautions  Bed in Low Position, Treaded Slipper Socks on Patient  Patient Turns/Positioning  Patient Turns Self from Side to Side  Patient Turns Assistance/Tolerance     Bed Positions  Bed Controls On  Head of Bed Elevated  Self regulated      Psychosocial/Neurologic Assessment  Psychosocial Assessment  Psychosocial (WDL):  Within Defined Limits  Neurologic Assessment  Neuro (WDL): Within Defined Limits  Level of Consciousness: Alert  EENT (WDL):  Within Defined Limits    Cardio/Pulmonary Assessment  Edema      Respiratory Breath Sounds  RUL Breath Sounds: Clear  RML Breath Sounds: Clear  RLL Breath Sounds: Clear  CHRIS Breath Sounds: Clear  LLL Breath Sounds: Clear  Cardiac Assessment   Cardiac (WDL):  WDL Except (HX: HTN)

## 2024-02-12 NOTE — PROGRESS NOTES
NURSING DAILY NOTE    Name: Beni Moore   Date of Admission: 2/8/2024   Admitting Diagnosis: Hx of BKA, left (HCC)  Attending Physician: Mena Ny D.o.  Allergies: Patient has no known allergies.    Safety  Patient Assist  mod a  Patient Precautions  Fall Risk, Non Weight Bearing Left Lower Extremity, Immobilizer Left Lower Extremity  Precaution Comments  L BKA  Bed Transfer Status  Contact Guard Assist  Toilet Transfer Status   Minimal Assist  Assistive Devices  Wheelchair  Oxygen  None - Room Air  Diet/Therapeutic Dining  Current Diet Order   Procedures    Diet Order Diet: Consistent CHO (Diabetic); Second Modifier: (optional): Renal     Pill Administration  whole  Agitated Behavioral Scale     ABS Level of Severity       Fall Risk  Has the patient had a fall this admission?   No  Florecita Roldan Fall Risk Scoring  20, HIGH RISK  Fall Risk Safety Measures  bed alarm and chair alarm    Vitals  Temperature: 36.9 °C (98.4 °F)  Temp src: Oral  Pulse: 69  Respiration: 16  Blood Pressure: (!) 157/93 (rn notified)  Blood Pressure MAP (Calculated): 114 MM HG  BP Location: Left, Upper Arm  Patient BP Position: Sitting     Oxygen  Pulse Oximetry: 99 %  O2 (LPM): 0  O2 Delivery Device: None - Room Air    Bowel and Bladder  Last Bowel Movement   (Patient refuses bowel medications)  Stool Type     Bowel Device     Continent  Bladder: Continent void   Bowel: Continent movement  Bladder Function  Urine Void (mL): 250 ml  Number of Times Voided: 2  Urine Color: Yellow  Genitourinary Assessment   Bladder Assessment (WDL):  WDL Except  Urine Color: Yellow  Time Void: Yes  Bladder Scan: Post Void  $ Bladder Scan Results (mL): 78    Skin  Johnny Score   19  Sensory Interventions   Bed Types: Standard/Trauma Mattress  Skin Preventative Measures: Pillows in Use for Support / Positioning  Moisture Interventions         Pain  Pain Rating Scale  0 - No Pain  Pain  Location  Shoulder  Pain Location Orientation  Left  Pain Interventions   Declines    ADLs    Bathing   Patient Refused Bathing  Linen Change      Personal Hygiene     Chlorhexidine Bath      Oral Care     Teeth/Dentures     Shave     Nutrition Percentage Eaten  *  * Meal *  *, Lunch, Between % Consumed  Environmental Precautions     Patient Turns/Positioning  Patient Turns Self from Side to Side  Patient Turns Assistance/Tolerance     Bed Positions     Head of Bed Elevated         Psychosocial/Neurologic Assessment  Psychosocial Assessment  Psychosocial (WDL):  Within Defined Limits  Neurologic Assessment  Neuro (WDL): Within Defined Limits  Level of Consciousness: Alert  EENT (WDL):  Within Defined Limits    Cardio/Pulmonary Assessment  Edema      Respiratory Breath Sounds  RUL Breath Sounds: Clear  RML Breath Sounds: Clear  RLL Breath Sounds: Clear  CHRIS Breath Sounds: Clear  LLL Breath Sounds: Clear  Cardiac Assessment   Cardiac (WDL):  WDL Except (HX: HTN)

## 2024-02-12 NOTE — PROGRESS NOTES
Physical Medicine & Rehabilitation Progress Note    Encounter Date: 2/12/2024    Chief Complaint: Feels well    Interval Events (Subjective):  Elevated blood pressure in the 140s-150s  No BM  Voiding    Patient seen and examined in his room. States leg is doing well. He is still edematous, but is urinating. D/w him regarding BP medications.       ROS: 14 point ROS negative unless otherwise specified in the HPI    Objective:  VITAL SIGNS: BP (!) 155/79   Pulse 67   Temp 37 °C (98.6 °F) (Oral)   Resp 18   Ht 1.829 m (6')   Wt 102 kg (225 lb)   SpO2 98%   BMI 30.52 kg/m²     GEN: No apparent distress  HEENT: Head normocephalic, atraumatic.  Sclera nonicteric bilaterally, no ocular discharge appreciated bilaterally.  CV: Extremities warm and well-perfused, edematous extremities.  PULMONARY: Breathing nonlabored on room air, no respiratory accessory muscle use.  Not requiring supplemental oxygen.  ABD: Soft, nontender.  SKIN: No appreciable skin breakdown on exposed areas of skin.  PSYCH: Mood and affect within normal limits.  NEURO: Awake alert.  Conversational.  Logical thought content.  MSK: Clinical picture from 2/11      Laboratory Values:  Recent Results (from the past 72 hour(s))   POCT glucose device results    Collection Time: 02/09/24 11:35 AM   Result Value Ref Range    POC Glucose, Blood 166 (H) 65 - 99 mg/dL   POCT glucose device results    Collection Time: 02/09/24  5:44 PM   Result Value Ref Range    POC Glucose, Blood 135 (H) 65 - 99 mg/dL   CREATININE CLEARANCE    Collection Time: 02/09/24  6:45 PM   Result Value Ref Range    Weight - Creatinine Clearance 102.0 kg    Height - Creatinine Clearance 183 cm    Collection Period, Urine 24 hr    Creatine Excreated 898 mg/24 hr    Creat.Clearance 17 (L) 97 - 137 mL/min    Total Volume, Urine 1400 ml mL    Creatinine Plasma, Creatinine 2.79 (H) 0.50 - 1.40 mg/dL    Creatinine, Urine 64.11 mg/dL   POCT glucose device results    Collection Time: 02/09/24   8:42 PM   Result Value Ref Range    POC Glucose, Blood 165 (H) 65 - 99 mg/dL   Basic Metabolic Panel    Collection Time: 02/10/24  6:16 AM   Result Value Ref Range    Sodium 131 (L) 135 - 145 mmol/L    Potassium 4.1 3.6 - 5.5 mmol/L    Chloride 102 96 - 112 mmol/L    Co2 22 20 - 33 mmol/L    Glucose 120 (H) 65 - 99 mg/dL    Bun 32 (H) 8 - 22 mg/dL    Creatinine 2.57 (H) 0.50 - 1.40 mg/dL    Calcium 7.0 (L) 8.5 - 10.5 mg/dL    Anion Gap 7.0 7.0 - 16.0   ESTIMATED GFR    Collection Time: 02/10/24  6:16 AM   Result Value Ref Range    GFR (CKD-EPI) 29 (A) >60 mL/min/1.73 m 2   POCT glucose device results    Collection Time: 02/10/24  7:24 AM   Result Value Ref Range    POC Glucose, Blood 105 (H) 65 - 99 mg/dL   POCT glucose device results    Collection Time: 02/10/24 11:15 AM   Result Value Ref Range    POC Glucose, Blood 158 (H) 65 - 99 mg/dL   POCT glucose device results    Collection Time: 02/10/24  4:55 PM   Result Value Ref Range    POC Glucose, Blood 145 (H) 65 - 99 mg/dL   POCT glucose device results    Collection Time: 02/10/24  8:03 PM   Result Value Ref Range    POC Glucose, Blood 189 (H) 65 - 99 mg/dL   POCT glucose device results    Collection Time: 02/11/24  8:09 AM   Result Value Ref Range    POC Glucose, Blood 145 (H) 65 - 99 mg/dL   POCT glucose device results    Collection Time: 02/11/24 11:31 AM   Result Value Ref Range    POC Glucose, Blood 177 (H) 65 - 99 mg/dL   POCT glucose device results    Collection Time: 02/11/24  5:29 PM   Result Value Ref Range    POC Glucose, Blood 221 (H) 65 - 99 mg/dL   POCT glucose device results    Collection Time: 02/11/24  9:10 PM   Result Value Ref Range    POC Glucose, Blood 254 (H) 65 - 99 mg/dL   CBC WITHOUT DIFFERENTIAL    Collection Time: 02/12/24  5:43 AM   Result Value Ref Range    WBC 6.0 4.8 - 10.8 K/uL    RBC 3.06 (L) 4.70 - 6.10 M/uL    Hemoglobin 8.8 (L) 14.0 - 18.0 g/dL    Hematocrit 26.8 (L) 42.0 - 52.0 %    MCV 87.6 81.4 - 97.8 fL    MCH 28.8 27.0 -  33.0 pg    MCHC 32.8 32.3 - 36.5 g/dL    RDW 45.1 35.9 - 50.0 fL    Platelet Count 196 164 - 446 K/uL    MPV 9.7 9.0 - 12.9 fL   Basic Metabolic Panel    Collection Time: 02/12/24  5:43 AM   Result Value Ref Range    Sodium 133 (L) 135 - 145 mmol/L    Potassium 4.6 3.6 - 5.5 mmol/L    Chloride 103 96 - 112 mmol/L    Co2 21 20 - 33 mmol/L    Glucose 89 65 - 99 mg/dL    Bun 39 (H) 8 - 22 mg/dL    Creatinine 2.83 (H) 0.50 - 1.40 mg/dL    Calcium 7.0 (L) 8.5 - 10.5 mg/dL    Anion Gap 9.0 7.0 - 16.0   ESTIMATED GFR    Collection Time: 02/12/24  5:43 AM   Result Value Ref Range    GFR (CKD-EPI) 25 (A) >60 mL/min/1.73 m 2   POCT glucose device results    Collection Time: 02/12/24  8:07 AM   Result Value Ref Range    POC Glucose, Blood 100 (H) 65 - 99 mg/dL   POCT glucose device results    Collection Time: 02/12/24 10:42 AM   Result Value Ref Range    POC Glucose, Blood 125 (H) 65 - 99 mg/dL       Medications:  Scheduled Medications   Medication Dose Frequency    furosemide  60 mg BID DIURETIC    cholestyramine  1 Packet BID    therapeutic multivitamin-minerals  1 Tablet DAILY WITH LUNCH    zinc sulfate  220 mg DAILY WITH LUNCH    ascorbic acid  500 mg BID WITH MEALS    aspirin  81 mg DAILY    senna-docusate  2 Tablet BID    atorvastatin  80 mg Q EVENING    calcium carbonate  500 mg BID WITH MEALS    epoetin  5,000 Units MO, WE + FR    heparin  5,000 Units Q8HRS    insulin GLARGINE  15 Units Q EVENING    insulin regular  3-14 Units 4X/DAY ACHS    omeprazole  20 mg QAM    Pharmacy Consult Request  1 Each PHARMACY TO DOSE     PRN medications: senna-docusate **AND** polyethylene glycol/lytes **AND** bisacodyl, Respiratory Therapy Consult, hydrALAZINE, carboxymethylcellulose, benzocaine-menthol, mag hydrox-al hydrox-simeth, ondansetron **OR** ondansetron, traZODone, sodium chloride, insulin regular **AND** POC blood glucose manual result **AND** NOTIFY MD and PharmD **AND** Administer 20 grams of glucose (approximately 8 ounces  of fruit juice) every 15 minutes PRN FSBG less than 70 mg/dL **AND** dextrose bolus, acetaminophen, heparin, lidocaine PF, oxyCODONE immediate-release **OR** oxyCODONE immediate-release    Diet:  Current Diet Order   Procedures    Diet Order Diet: Consistent CHO (Diabetic); Second Modifier: (optional): Renal       Medical Decision Making and Plan:  Left BKA secondary to acute on chronic osteomyelitis in setting of diabetes mellitus  Osteomyelitis resolved with infection control with BKA and s/p IV antibiotics  PT and OT for mobility and ADLs. Per guidelines, 15 hours per week between PT, OT and/or SLP.  Follow-up orthopedics    2/9 erythematous residual limb on admission.  WBC is reassuring and is normal. Despite erythema, not with any excess warmth compared to rest of residual limb. No purulent. No clinical concern for infection at this time. Possible due to gravity dependency when up with therapy and pressure from wrapping. Continue to monitor closely.     2/12 Clinicaly picture 2/11 with good shape an non-concerning exam.      Acute blood loss anemia  Postoperative and CKD, required 1 unit PRBC  Hemoglobin 2/9 is improved at 9.4 --> 8.8 2/12.  Nephrology following     Acute renal failure stage IIIb CKD on HD  S/p right IJ tunneled cath placement 1/29/2024  Hemodialysis Monday, Wednesday, Friday  Fluid positive  2/9 place compression stocking right lower extremity  2/9 discussed with nephrology regarding use of Lasix, initiated.   2/12 HD on HOLD. Hoping for renal recovery.   Nephrology following     Type 2 diabetes mellitus with hyperglycemia, insulin-dependent  Long-term complications causing CKD, peripheral neuropathy, neurogenic bowel  Continue long-acting and short acting insulin  At home had been on Lantus 36 at night with SSI     Hypocalcemia  Continue supplementing 2/12    Hyperphosphatemia  Resolved on admission 2.7    Hyponatremia  Patient has specific diet for his GI system  On dialysis  Nephrology  following  Stable low 133 2/12    Elevated TSH  Normal free T4  Continue to monitor     History of CAD s/p ARTURO 9/22/2019  Last cardiology note 7/7/2023: On atorvastatin 80 mg, aspirin 81 mg, carvedilol 3.125 mg twice daily, Jardiance 25 mg daily  Continue aspirin 81 mg daily and statin 80 mg nightly     Hypertension  Last cardiology note 7/7/2023: Previously had been on carvedilol 3.125 mg twice daily and enalapril 10 mg twice daily  2/9 blood pressures running in the 130s-150s. Hold on BP medication initiation, potential diuresis.   2/12 blood pressure elevated.  He is on Lasix 60 mg twice daily. D/w nephrology start amlodipine 5mg daily. Hold on BB due to HR, hold on ACE-I due to hopefully return of renal function. Will need to start reducing Lasix.      Pain  Currently not reporting pain  As needed Tylenol  As needed oxycodone     Skin  Patient at risk for skin breakdown due to debility in areas including sacrum, achilles, elbows and head in addition to other sites. Nursing to assess skin daily.      GI Ppx  Patient on Prilosec for GERD prophylaxis.      Bowel   Neurogenic bowel secondary to long-term complications from diabetes  Colestyramine at 10 AM and 5 PM  Follows with GI  Patient on Senna-docusate for constipation prophylaxis.   Abnormal frequency of bowel movements at baseline.  Can go 5 to 7 days.     Bladder  TV/PVR/BS PRN   PVRs in 200's, likely positional. Monitor     Upcoming Labs/imaging: Labs per nephrology.      DVT PROPHYLAXIS: Heparin 5000 units q8 hrs     HOSPITALIST FOLLOWING: No. Nephrology following - d/w nephrology 2/12     CODE STATUS: FULL CODE     DISPO: Home with supportive parents.      LISE: TBD     ADDITIONAL MEDICAL NEEDS ON D/C (IV abx, O2, etc): TBD     MEDS SENT TO: TBD     DISCHARGE SPECIALIST FOLLOW UP: Orthopedics ([facility] Seg[facility])     Patient to scheduled follow up with their PCP within 2 weeks from discharge from the [facility].       ____________________________________    Dr. Mena Ny DO MS  [facility] - Physical Medicine & Rehabilitation   ____________________________________

## 2024-02-13 ENCOUNTER — APPOINTMENT (OUTPATIENT)
Dept: OCCUPATIONAL THERAPY | Facility: REHABILITATION | Age: 54
DRG: 559 | End: 2024-02-13
Attending: PHYSICAL MEDICINE & REHABILITATION
Payer: MEDICARE

## 2024-02-13 ENCOUNTER — APPOINTMENT (OUTPATIENT)
Dept: PHYSICAL THERAPY | Facility: REHABILITATION | Age: 54
DRG: 559 | End: 2024-02-13
Attending: PHYSICAL MEDICINE & REHABILITATION
Payer: MEDICARE

## 2024-02-13 LAB
ALBUMIN SERPL BCP-MCNC: 2.1 G/DL (ref 3.2–4.9)
ANION GAP SERPL CALC-SCNC: 9 MMOL/L (ref 7–16)
BUN SERPL-MCNC: 50 MG/DL (ref 8–22)
CALCIUM ALBUM COR SERPL-MCNC: 8.8 MG/DL (ref 8.5–10.5)
CALCIUM SERPL-MCNC: 7.3 MG/DL (ref 8.5–10.5)
CHLORIDE SERPL-SCNC: 101 MMOL/L (ref 96–112)
CO2 SERPL-SCNC: 20 MMOL/L (ref 20–33)
CREAT SERPL-MCNC: 2.7 MG/DL (ref 0.5–1.4)
GFR SERPLBLD CREATININE-BSD FMLA CKD-EPI: 27 ML/MIN/1.73 M 2
GLUCOSE BLD STRIP.AUTO-MCNC: 150 MG/DL (ref 65–99)
GLUCOSE BLD STRIP.AUTO-MCNC: 179 MG/DL (ref 65–99)
GLUCOSE BLD STRIP.AUTO-MCNC: 189 MG/DL (ref 65–99)
GLUCOSE BLD STRIP.AUTO-MCNC: 195 MG/DL (ref 65–99)
GLUCOSE SERPL-MCNC: 160 MG/DL (ref 65–99)
NT-PROBNP SERPL IA-MCNC: 3138 PG/ML (ref 0–125)
PHOSPHATE SERPL-MCNC: 2.4 MG/DL (ref 2.5–4.5)
POTASSIUM SERPL-SCNC: 4.7 MMOL/L (ref 3.6–5.5)
SODIUM SERPL-SCNC: 130 MMOL/L (ref 135–145)

## 2024-02-13 PROCEDURE — 700102 HCHG RX REV CODE 250 W/ 637 OVERRIDE(OP): Performed by: INTERNAL MEDICINE

## 2024-02-13 PROCEDURE — 99232 SBSQ HOSP IP/OBS MODERATE 35: CPT | Performed by: PHYSICAL MEDICINE & REHABILITATION

## 2024-02-13 PROCEDURE — 700111 HCHG RX REV CODE 636 W/ 250 OVERRIDE (IP): Performed by: PHYSICAL MEDICINE & REHABILITATION

## 2024-02-13 PROCEDURE — 97530 THERAPEUTIC ACTIVITIES: CPT

## 2024-02-13 PROCEDURE — 80069 RENAL FUNCTION PANEL: CPT

## 2024-02-13 PROCEDURE — 97110 THERAPEUTIC EXERCISES: CPT | Mod: CO

## 2024-02-13 PROCEDURE — A9270 NON-COVERED ITEM OR SERVICE: HCPCS | Performed by: INTERNAL MEDICINE

## 2024-02-13 PROCEDURE — 83880 ASSAY OF NATRIURETIC PEPTIDE: CPT

## 2024-02-13 PROCEDURE — 770010 HCHG ROOM/CARE - REHAB SEMI PRIVAT*

## 2024-02-13 PROCEDURE — 700102 HCHG RX REV CODE 250 W/ 637 OVERRIDE(OP): Performed by: PHYSICAL MEDICINE & REHABILITATION

## 2024-02-13 PROCEDURE — 82962 GLUCOSE BLOOD TEST: CPT | Mod: 91

## 2024-02-13 PROCEDURE — A9270 NON-COVERED ITEM OR SERVICE: HCPCS | Performed by: PHYSICAL MEDICINE & REHABILITATION

## 2024-02-13 PROCEDURE — 97535 SELF CARE MNGMENT TRAINING: CPT

## 2024-02-13 RX ORDER — FUROSEMIDE 40 MG/1
80 TABLET ORAL
Status: DISCONTINUED | OUTPATIENT
Start: 2024-02-13 | End: 2024-02-14

## 2024-02-13 RX ADMIN — TRAZODONE HYDROCHLORIDE 50 MG: 50 TABLET ORAL at 21:00

## 2024-02-13 RX ADMIN — INSULIN HUMAN 3 UNITS: 100 INJECTION, SOLUTION PARENTERAL at 20:47

## 2024-02-13 RX ADMIN — ATORVASTATIN CALCIUM 80 MG: 40 TABLET, FILM COATED ORAL at 20:40

## 2024-02-13 RX ADMIN — INSULIN GLARGINE-YFGN 15 UNITS: 100 INJECTION, SOLUTION SUBCUTANEOUS at 20:49

## 2024-02-13 RX ADMIN — HYDRALAZINE HYDROCHLORIDE 25 MG: 25 TABLET, FILM COATED ORAL at 16:15

## 2024-02-13 RX ADMIN — HEPARIN SODIUM 5000 UNITS: 5000 INJECTION, SOLUTION INTRAVENOUS; SUBCUTANEOUS at 05:50

## 2024-02-13 RX ADMIN — FUROSEMIDE 60 MG: 20 TABLET ORAL at 05:49

## 2024-02-13 RX ADMIN — CHOLESTYRAMINE 4 G: 4 POWDER, FOR SUSPENSION ORAL at 16:15

## 2024-02-13 RX ADMIN — INSULIN HUMAN 3 UNITS: 100 INJECTION, SOLUTION PARENTERAL at 08:20

## 2024-02-13 RX ADMIN — Medication 220 MG: at 14:59

## 2024-02-13 RX ADMIN — HEPARIN SODIUM 5000 UNITS: 5000 INJECTION, SOLUTION INTRAVENOUS; SUBCUTANEOUS at 14:59

## 2024-02-13 RX ADMIN — OXYCODONE HYDROCHLORIDE AND ACETAMINOPHEN 500 MG: 500 TABLET ORAL at 17:44

## 2024-02-13 RX ADMIN — CALCIUM 500 MG: 500 TABLET ORAL at 08:23

## 2024-02-13 RX ADMIN — FUROSEMIDE 80 MG: 40 TABLET ORAL at 16:15

## 2024-02-13 RX ADMIN — HEPARIN SODIUM 5000 UNITS: 5000 INJECTION, SOLUTION INTRAVENOUS; SUBCUTANEOUS at 20:50

## 2024-02-13 RX ADMIN — OMEPRAZOLE 20 MG: 20 CAPSULE, DELAYED RELEASE ORAL at 08:23

## 2024-02-13 RX ADMIN — OXYCODONE HYDROCHLORIDE AND ACETAMINOPHEN 500 MG: 500 TABLET ORAL at 05:51

## 2024-02-13 RX ADMIN — AMLODIPINE BESYLATE 5 MG: 5 TABLET ORAL at 05:51

## 2024-02-13 RX ADMIN — Medication 1 TABLET: at 14:59

## 2024-02-13 RX ADMIN — CHOLESTYRAMINE 4 G: 4 POWDER, FOR SUSPENSION ORAL at 11:18

## 2024-02-13 RX ADMIN — ASPIRIN 81 MG: 81 TABLET, COATED ORAL at 05:51

## 2024-02-13 RX ADMIN — INSULIN HUMAN 3 UNITS: 100 INJECTION, SOLUTION PARENTERAL at 17:45

## 2024-02-13 RX ADMIN — CALCIUM 500 MG: 500 TABLET ORAL at 17:44

## 2024-02-13 NOTE — PROGRESS NOTES
Nephrology Daily Progress Note    Date of Service  2/12/2024    Chief Complaint  53 y.o. male admitted 1/25/2024 with diabetic foot infection, complicated by AXEL on CKD stage III, required dialysis    Interval Problem Update  Patient has no chest pain or shortness of breath today  He is being evaluated for inpatient rehab  2/8 patient has no new complaints  Plan to transfer to rehab today  2/9 patient has no new complaints, feels better overall  2/10 pt is doing better, no CP, no SOB  2/12 -doing better, good UOP  Creat stable -off dialysis  LE edema -on lasix, low salt diet  Review of Systems  Review of Systems   Constitutional:  Negative for chills, fever, malaise/fatigue and weight loss.   HENT:  Negative for congestion, hearing loss and sinus pain.    Eyes: Negative.    Respiratory:  Negative for cough, hemoptysis, shortness of breath and wheezing.    Cardiovascular:  Positive for leg swelling. Negative for chest pain, palpitations and orthopnea.   Gastrointestinal:  Negative for abdominal pain, diarrhea, nausea and vomiting.   Genitourinary:  Negative for dysuria, flank pain, frequency, hematuria and urgency.   Skin: Negative.    All other systems reviewed and are negative.       Physical Exam  Temp:  [36.6 °C (97.9 °F)-37 °C (98.6 °F)] 36.6 °C (97.9 °F)  Pulse:  [67-74] 69  Resp:  [17-18] 18  BP: (146-160)/(73-90) 160/90  SpO2:  [98 %-99 %] 99 %    Physical Exam  Vitals reviewed.   Constitutional:       General: He is not in acute distress.     Appearance: Normal appearance. He is well-developed. He is not diaphoretic.   HENT:      Head: Normocephalic and atraumatic.      Nose: Nose normal.      Mouth/Throat:      Mouth: Mucous membranes are moist.      Pharynx: Oropharynx is clear.   Eyes:      Extraocular Movements: Extraocular movements intact.      Conjunctiva/sclera: Conjunctivae normal.      Pupils: Pupils are equal, round, and reactive to light.   Cardiovascular:      Rate and Rhythm: Normal rate and  "regular rhythm.      Pulses: Normal pulses.      Heart sounds: Normal heart sounds.   Pulmonary:      Effort: Pulmonary effort is normal. No respiratory distress.      Breath sounds: Normal breath sounds. No wheezing or rales.   Abdominal:      General: Bowel sounds are normal. There is no distension.      Palpations: Abdomen is soft. There is no mass.      Tenderness: There is no abdominal tenderness. There is no right CVA tenderness or left CVA tenderness.   Musculoskeletal:      Cervical back: Normal range of motion and neck supple.      Right lower leg: Edema present.      Left lower leg: Edema present.   Skin:     General: Skin is warm.      Coloration: Skin is not pale.      Findings: No erythema or rash.   Neurological:      General: No focal deficit present.      Mental Status: He is alert and oriented to person, place, and time.      Cranial Nerves: No cranial nerve deficit.      Coordination: Coordination normal.   Psychiatric:         Mood and Affect: Mood normal.         Behavior: Behavior normal.         Thought Content: Thought content normal.         Judgment: Judgment normal.         Fluids    Intake/Output Summary (Last 24 hours) at 2/12/2024 1711  Last data filed at 2/12/2024 1030  Gross per 24 hour   Intake 850 ml   Output 2100 ml   Net -1250 ml         Laboratory  Recent Labs     02/12/24  0543   WBC 6.0   RBC 3.06*   HEMOGLOBIN 8.8*   HEMATOCRIT 26.8*   MCV 87.6   MCH 28.8   MCHC 32.8   RDW 45.1   PLATELETCT 196   MPV 9.7       Recent Labs     02/10/24  0616 02/12/24  0543   SODIUM 131* 133*   POTASSIUM 4.1 4.6   CHLORIDE 102 103   CO2 22 21   GLUCOSE 120* 89   BUN 32* 39*   CREATININE 2.57* 2.83*   CALCIUM 7.0* 7.0*           No results for input(s): \"NTPROBNP\" in the last 72 hours.        Imaging  No orders to display         Assessment/Plan  1 AXEL on CKD stage IIIb due to ATN -holding dialysis -watching for recovery  2 septic shock -improved -stable  3 anemia: low hb leve -to monitor  4 " osteomyelitis  5 Edema -low salt diet, lasix, compression stockings    Recs:  no acute need for HD, evaluate daily  Continue diuretics  Renal diet  Daily BMP, CBC.  Renal dose all meds  Avoid nephrotoxins like NSAIDs., iv contrast  Prognosis guarded.

## 2024-02-13 NOTE — THERAPY
Physical Therapy   Daily Treatment     Patient Name: Beni Moore  Age:  53 y.o., Sex:  male  Medical Record #: [id number]  Today's Date: 2/12/2024     Precautions  Precautions: Fall Risk, Non Weight Bearing Left Lower Extremity, Immobilizer Left Lower Extremity  Comments: L BKA    Subjective    Pt up in , willing to participate.     Objective       02/12/24 1531   PT Charge Group   PT Therapeutic Exercise (Units) 2   PT Total Time Spent   PT Individual Total Time Spent (Mins) 30   Transfer Functional Level of Assist   Bed, Chair, Wheelchair Transfer Standby Assist   Bed Chair Wheelchair Transfer Description Adaptive equipment;Increased time;Slideboard transfer from bed to wheelchair;Supervision for safety   Sitting Lower Body Exercises   Other Exercises LUE AAROM 2 x 10 for shoulder flexion/ extension, internal/external rotation, shoulder abduction with gentle overpressure at end ranges. Seated dowel exercises with mirror for postural re-ed x 10 for rows, shoulder int/ ext rotation.   Neuro-Muscular Treatments   Neuro-Muscular Treatments Biofeedback;Facilitation;Sequencing;Tactile Cuing   Comments neuro re-ed posture/ scapular stabilization throughout AAROM to L shoulder     Sit<>stand from elevated edge of mat to FWW with min/mod A and mirror for visual feedback, pt maintains R midline shift throughout standing 2* L BKA and L shoulder fx/ORIF x 9 weeks ago.     Assessment    Pt tolerated ROM/ stretching and mobility training well. Limited by L BKA and L shoulder injury, will need to clarify weight bearing limitation to L shoulder.     Strengths: Able to follow instructions, Alert and oriented, Effective communication skills, Good carryover of learning, Good insight into deficits/needs, Independent prior level of function, Motivated for self care and independence, Pleasant and cooperative, Supportive family, Willingly participates in therapeutic activities  Barriers: Fatigue, Generalized weakness, Impaired  activity tolerance, Impaired balance, Pain    Plan    Continue with mat program, emphasize prone positioning for stretching,AAROM/ PROM and stretching to L shoulder, transfer training from various heights, car transfer, WC mobility outdoors, standing with FWW.       DME  PT DME Recommendations  Wheelchair:  (Pt already has his own WC and slideboard)  Assistive Device: Front Wheeled Walker    Passport items to be completed:  Get in/out of bed safely, in/out of a vehicle, safely use mobility device, walk or wheel around home/community, navigate up and down stairs, show how to get up/down from the ground, ensure home is accessible, demonstrate HEP, complete caregiver training    Physical Therapy Problems (Active)       Problem: Balance       Dates: Start:  02/09/24         Goal: STG-Within one week, patient will maintain static standing in parallel bars Gina with single UE support for 10 seconds in order to facilitate completion of standing ADLs       Dates: Start:  02/09/24               Problem: Mobility       Dates: Start:  02/09/24         Goal: STG-Within one week, patient will propel wheelchair community distances of at least 150' independently       Dates: Start:  02/09/24               Problem: Mobility Transfers       Dates: Start:  02/09/24         Goal: STG-Within one week, patient will sit to stand from WC Gina with UE pushing from WC        Dates: Start:  02/09/24            Goal: STG-Within one week, patient will transfer bed to chair CGA        Dates: Start:  02/09/24               Problem: PT-Long Term Goals       Dates: Start:  02/09/24         Goal: LTG-By discharge, patient will tolerate standing with intermittent UE support for at least 1 minute CGA in order to facilitate standing ADLs        Dates: Start:  02/09/24            Goal: LTG-By discharge, patient will perform squat pivot transfer from one surface to another SPV       Dates: Start:  02/09/24            Goal: LTG-By discharge, patient will  perform home exercise program independently        Dates: Start:  02/09/24            Goal: LTG-By discharge, patient will transfer in/out of a car using LRAD or no AD CGA       Dates: Start:  02/09/24

## 2024-02-13 NOTE — PROGRESS NOTES
NURSING DAILY NOTE    Name: Beni Moore   Date of Admission: 2/8/2024   Admitting Diagnosis: Hx of BKA, left (HCC)  Attending Physician: Mena Ny D.o.  Allergies: Patient has no known allergies.    Safety  Patient Assist  mod assist  Patient Precautions  Fall Risk, Non Weight Bearing Left Lower Extremity, Immobilizer Left Lower Extremity  Precaution Comments  L BKA  Bed Transfer Status  Standby Assist  Toilet Transfer Status   Minimal Assist  Assistive Devices  Wheelchair  Oxygen  None - Room Air  Diet/Therapeutic Dining  Current Diet Order   Procedures    Diet Order Diet: Consistent CHO (Diabetic); Second Modifier: (optional): Renal     Pill Administration  whole  Agitated Behavioral Scale     ABS Level of Severity       Fall Risk  Has the patient had a fall this admission?   No  Florecita Roldan Fall Risk Scoring  22, HIGH RISK  Fall Risk Safety Measures  bed alarm, chair alarm, poor balance, and low vision/ hearing    Vitals  Temperature: 36.6 °C (97.9 °F)  Temp src: Oral  Pulse: 69  Respiration: 18  Blood Pressure: (!) 159/90  Blood Pressure MAP (Calculated): 113 MM HG  BP Location: Right, Upper Arm  Patient BP Position: Sitting     Oxygen  Pulse Oximetry: 99 %  O2 (LPM): 0  O2 Delivery Device: None - Room Air    Bowel and Bladder  Last Bowel Movement   (Refused bowel meds despite education.)  Stool Type     Bowel Device     Continent  Bladder: Continent void   Bowel: Continent movement  Bladder Function  Urine Void (mL): 300 ml  Number of Times Voided: 1  Urine Color: Yellow  Genitourinary Assessment   Bladder Assessment (WDL):  WDL Except  Farrell Catheter: Not Applicable  Urinary Elimination: Anuric/ Dialysis Patient (able to void)  Urine Color: Yellow  Time Void: Yes  Bladder Scan: Post Void  $ Bladder Scan Results (mL): 78    Skin  Johnny Score   18  Sensory Interventions   Bed Types: Standard/Trauma Mattress  Skin Preventative Measures:  Pillows in Use for Support / Positioning  Moisture Interventions  Moisturizers/Barriers: Barrier Wipes      Pain  Pain Rating Scale  0 - No Pain  Pain Location  Shoulder  Pain Location Orientation  Left  Pain Interventions   Declines    ADLs    Bathing   Shower, * * With Assistance from, Staff  Linen Change      Personal Hygiene     Chlorhexidine Bath      Oral Care     Teeth/Dentures     Shave     Nutrition Percentage Eaten  Between % Consumed, Lunch  Environmental Precautions  Bed in Low Position, Treaded Slipper Socks on Patient  Patient Turns/Positioning  Sitting Up in Wheelchair  Patient Turns Assistance/Tolerance     Bed Positions  Bed Controls On  Head of Bed Elevated  Self regulated      Psychosocial/Neurologic Assessment  Psychosocial Assessment  Psychosocial (WDL):  Within Defined Limits  Neurologic Assessment  Neuro (WDL): Exceptions to WDL  Level of Consciousness: Alert  Orientation Level: Oriented X4  Cognition: Follows commands, Appropriate attention/concentration  Speech: Clear  EENT (WDL):  Within Defined Limits    Cardio/Pulmonary Assessment  Edema   LLE Edema: 1+  Respiratory Breath Sounds  RUL Breath Sounds: Clear  RML Breath Sounds: Clear  RLL Breath Sounds: Clear  CHRIS Breath Sounds: Clear  LLL Breath Sounds: Clear  Cardiac Assessment   Cardiac (WDL):  WDL Except

## 2024-02-13 NOTE — THERAPY
"Occupational Therapy  Daily Treatment     Patient Name: Beni Moore  Age:  53 y.o., Sex:  male  Medical Record #: [id number]  Today's Date: 2/13/2024     Precautions  Precautions: Fall Risk, Non Weight Bearing Left Lower Extremity, Immobilizer Left Lower Extremity  Comments: L BKA; L proximal humerus fx with ORIF (sx 12/06)         Subjective    \"I don't think I should be putting weight through my arm, it hurts when I do.\" Pt reported broken L arm x 1 month and unsure on WB restrictions- Discussed with PT- Pt plans to call surgeon for clarification today      Objective       02/13/24 1331   OT Charge Group   OT Therapy Activity (Units) 2   OT Total Time Spent   OT Individual Total Time Spent (Mins) 30   Cognition    Level of Consciousness Alert   Interdisciplinary Plan of Care Collaboration   IDT Collaboration with  Family / Caregiver   Patient Position at End of Therapy Seated;Call Light within Reach;Tray Table within Reach;Phone within Reach;Family / Friend in Room   Collaboration Comments parents present throughout session         Assessment    Pt tolerated session fair. Pt very concerned about increased water weight and edema in BLE- Dr. Ny notified and is working with nephrologist on getting more lasiks on board. Pt concerned with L broken arm x 1 month. Pt reported not knowing WB restrictions but pain reported during WB for standing and exercises. Pt reported being able to xfer himself using SB bed <> w/c, w/c <> toilet. Pt reported concerns about car xfer- discussed trialing this later after knowing WB restrictions. Pt reported concerns about R boot not fitting well- pt getting new offloading boot within the next week- pt reported that he's not supposed to walk/hop without boot on RLE. Pt reported difficulty with pant mgmt after shower yesterday- discussed having pt complete LBD by leaning side to side- pt stating that theres not enough room in chair- educated pt about completing in bed to prevent " standing and WB through LUE at this time. Pt reported that he would be able to do that next time.     Strengths: Able to follow instructions, Alert and oriented, Effective communication skills, Good endurance, Good insight into deficits/needs, Independent prior level of function, Motivated for self care and independence, Pleasant and cooperative, Supportive family, Willingly participates in therapeutic activities  Barriers: Decreased endurance, Fatigue, Generalized weakness, Impaired activity tolerance, Impaired balance, Limited mobility    Plan    Fxl txfrs   UE strengthening/ROM     DME  OT DME Recommendations  Bathroom Equipment:  (Grab bars in shower)     Passport items to be completed:  Perform bathroom transfers, complete dressing, complete feeding, get ready for the day, prepare a simple meal, participate in household tasks, adapt home for safety needs, demonstrate home exercise program, complete caregiver training     Occupational Therapy Goals (Active)       Problem: Bathing       Dates: Start:  02/09/24         Goal: STG-Within one week, patient will bathe w/ CGA and AE/DME as needed       Dates: Start:  02/09/24               Problem: Dressing       Dates: Start:  02/09/24         Goal: STG-Within one week, patient will dress LB w/ min A and AE as needed       Dates: Start:  02/09/24               Problem: Functional Transfers       Dates: Start:  02/09/24         Goal: STG-Within one week, patient will consistently transfer w/ CGA and DME as needed       Dates: Start:  02/09/24               Problem: IADL's       Dates: Start:  02/09/24         Goal: STG-Within one week, patient will access kitchen area w/ min A and LRAD       Dates: Start:  02/09/24               Problem: OT Long Term Goals       Dates: Start:  02/09/24         Goal: LTG-By discharge, patient will complete basic self care tasks w/ mod I/supervision and AE/DME as needed       Dates: Start:  02/09/24            Goal: LTG-By discharge,  patient will complete basic home management w/ supervision and LRAD       Dates: Start:  02/09/24               Problem: Toileting       Dates: Start:  02/09/24         Goal: STG-Within one week, patient will complete toileting tasks w/ min A and AE/DME as needed       Dates: Start:  02/09/24

## 2024-02-13 NOTE — CARE PLAN
The patient is Stable - Low risk of patient condition declining or worsening    Shift Goals  Clinical Goals: safety, sleep  Patient Goals: safety,    Progress made toward(s) clinical / shift goals:    Problem: Knowledge Deficit - Standard  Goal: Patient and family/care givers will demonstrate understanding of plan of care, disease process/condition, diagnostic tests and medications  Outcome: Progressing  Note: Pt education reinforced regarding plan of care with emphasis on adequate hydration, pt again shows better understanding with improved follow through, pt again reports understanding how this can effect constipation. POC ongoing. No further concerns as of present. Pt expresses no other needs at this time. Call light within reach

## 2024-02-13 NOTE — PROGRESS NOTES
NURSING DAILY NOTE    Name: Beni Moore   Date of Admission: 2/8/2024   Admitting Diagnosis: Hx of BKA, left (HCC)  Attending Physician: Mena Ny D.o.  Allergies: Patient has no known allergies.    Safety  Patient Assist  mod assist  Patient Precautions  Fall Risk, Non Weight Bearing Left Lower Extremity, Immobilizer Left Lower Extremity  Precaution Comments  L BKA  Bed Transfer Status  Standby Assist  Toilet Transfer Status   Minimal Assist  Assistive Devices  Rails, Slide board, Wheelchair  Oxygen  None - Room Air  Diet/Therapeutic Dining  Current Diet Order   Procedures    Diet Order Diet: Consistent CHO (Diabetic); Second Modifier: (optional): Renal     Pill Administration  whole  Agitated Behavioral Scale     ABS Level of Severity       Fall Risk  Has the patient had a fall this admission?   No  Florecita Roldan Fall Risk Scoring  22, HIGH RISK  Fall Risk Safety Measures  bed alarm, chair alarm, poor balance, and low vision/ hearing    Vitals  Temperature: 36.7 °C (98.1 °F)  Temp src: Oral  Pulse: 72  Respiration: 18  Blood Pressure: 135/64  Blood Pressure MAP (Calculated): 88 MM HG  BP Location: Right, Upper Arm  Patient BP Position: Supine     Oxygen  Pulse Oximetry: 98 %  O2 (LPM): 0  O2 Delivery Device: None - Room Air    Bowel and Bladder  Last Bowel Movement   (Refused bowel meds despite education.)  Stool Type     Bowel Device     Continent  Bladder: Continent void   Bowel: Continent movement  Bladder Function  Urine Void (mL): 700 ml (urinals)  Number of Times Voided: 1  Urine Color: Yellow  Genitourinary Assessment   Bladder Assessment (WDL):  WDL Except  Farrell Catheter: Not Applicable  Urinary Elimination: Anuric/ Dialysis Patient (able to void)  Urine Color: Yellow  Time Void: Yes  Bladder Scan: Post Void  $ Bladder Scan Results (mL): 78    Skin  Johnny Score   18  Sensory Interventions   Bed Types: Standard/Trauma Mattress  Skin  Preventative Measures: Pillows in Use for Support / Positioning  Moisture Interventions  Moisturizers/Barriers: Barrier Wipes      Pain  Pain Rating Scale  0 - No Pain  Pain Location  Shoulder  Pain Location Orientation  Left  Pain Interventions   Declines    ADLs    Bathing   Shower, * * With Assistance from, Staff  Linen Change      Personal Hygiene     Chlorhexidine Bath      Oral Care     Teeth/Dentures     Shave     Nutrition Percentage Eaten  Between % Consumed, Lunch  Environmental Precautions  Bed in Low Position, Treaded Slipper Socks on Patient  Patient Turns/Positioning  Patient Turns Self from Side to Side  Patient Turns Assistance/Tolerance  Assistance of One (uses slide board)  Bed Positions  Bed Controls On  Head of Bed Elevated  Self regulated      Psychosocial/Neurologic Assessment  Psychosocial Assessment  Psychosocial (WDL):  Within Defined Limits  Neurologic Assessment  Neuro (WDL): Exceptions to WDL  Level of Consciousness: Alert  Orientation Level: Oriented X4  Cognition: Follows commands, Appropriate attention/concentration  Speech: Clear  EENT (WDL):  WDL Except    Cardio/Pulmonary Assessment  Edema   LLE Edema: 1+  Respiratory Breath Sounds  RUL Breath Sounds: Clear  RML Breath Sounds: Clear  RLL Breath Sounds: Clear  CHRIS Breath Sounds: Clear  LLL Breath Sounds: Clear  Cardiac Assessment   Cardiac (WDL):  WDL Except (HTN, DM, Circulatory problems)

## 2024-02-13 NOTE — PROGRESS NOTES
Physical Medicine & Rehabilitation Progress Note    Encounter Date: 2/13/2024    Chief Complaint: Edematous    Interval Events (Subjective):  Vital signs: Blood pressure better controlled  Voiding, no bowel movement    Patient seen and examined in his room.  Patient feels like he is more swollen than he had been.  He would like to increase the Lasix.      ROS: 14 point ROS negative unless otherwise specified in the HPI    Objective:  VITAL SIGNS: /73   Pulse 79   Temp 36.5 °C (97.7 °F) (Oral)   Resp 17   Ht 1.829 m (6')   Wt 102 kg (225 lb)   SpO2 99%   BMI 30.52 kg/m²     GEN: No apparent distress  HEENT: Head normocephalic, atraumatic.  Sclera nonicteric bilaterally, no ocular discharge appreciated bilaterally.  CV: Extremities warm and well-perfused, edematous extremities.  PULMONARY: Breathing nonlabored on room air, no respiratory accessory muscle use.  Not requiring supplemental oxygen.  ABD: Soft, nontender.  SKIN: No appreciable skin breakdown on exposed areas of skin.  PSYCH: Mood and affect within normal limits.  NEURO: Awake alert.  Conversational.  Logical thought content.  MSK: Clinical picture from 2/12      Laboratory Values:  Recent Results (from the past 72 hour(s))   POCT glucose device results    Collection Time: 02/10/24  4:55 PM   Result Value Ref Range    POC Glucose, Blood 145 (H) 65 - 99 mg/dL   POCT glucose device results    Collection Time: 02/10/24  8:03 PM   Result Value Ref Range    POC Glucose, Blood 189 (H) 65 - 99 mg/dL   POCT glucose device results    Collection Time: 02/11/24  8:09 AM   Result Value Ref Range    POC Glucose, Blood 145 (H) 65 - 99 mg/dL   POCT glucose device results    Collection Time: 02/11/24 11:31 AM   Result Value Ref Range    POC Glucose, Blood 177 (H) 65 - 99 mg/dL   POCT glucose device results    Collection Time: 02/11/24  5:29 PM   Result Value Ref Range    POC Glucose, Blood 221 (H) 65 - 99 mg/dL   POCT glucose device results    Collection  Time: 02/11/24  9:10 PM   Result Value Ref Range    POC Glucose, Blood 254 (H) 65 - 99 mg/dL   CBC WITHOUT DIFFERENTIAL    Collection Time: 02/12/24  5:43 AM   Result Value Ref Range    WBC 6.0 4.8 - 10.8 K/uL    RBC 3.06 (L) 4.70 - 6.10 M/uL    Hemoglobin 8.8 (L) 14.0 - 18.0 g/dL    Hematocrit 26.8 (L) 42.0 - 52.0 %    MCV 87.6 81.4 - 97.8 fL    MCH 28.8 27.0 - 33.0 pg    MCHC 32.8 32.3 - 36.5 g/dL    RDW 45.1 35.9 - 50.0 fL    Platelet Count 196 164 - 446 K/uL    MPV 9.7 9.0 - 12.9 fL   Basic Metabolic Panel    Collection Time: 02/12/24  5:43 AM   Result Value Ref Range    Sodium 133 (L) 135 - 145 mmol/L    Potassium 4.6 3.6 - 5.5 mmol/L    Chloride 103 96 - 112 mmol/L    Co2 21 20 - 33 mmol/L    Glucose 89 65 - 99 mg/dL    Bun 39 (H) 8 - 22 mg/dL    Creatinine 2.83 (H) 0.50 - 1.40 mg/dL    Calcium 7.0 (L) 8.5 - 10.5 mg/dL    Anion Gap 9.0 7.0 - 16.0   ESTIMATED GFR    Collection Time: 02/12/24  5:43 AM   Result Value Ref Range    GFR (CKD-EPI) 25 (A) >60 mL/min/1.73 m 2   POCT glucose device results    Collection Time: 02/12/24  8:07 AM   Result Value Ref Range    POC Glucose, Blood 100 (H) 65 - 99 mg/dL   POCT glucose device results    Collection Time: 02/12/24 10:42 AM   Result Value Ref Range    POC Glucose, Blood 125 (H) 65 - 99 mg/dL   POCT glucose device results    Collection Time: 02/12/24  4:59 PM   Result Value Ref Range    POC Glucose, Blood 151 (H) 65 - 99 mg/dL   POCT glucose device results    Collection Time: 02/12/24  8:59 PM   Result Value Ref Range    POC Glucose, Blood 206 (H) 65 - 99 mg/dL   POCT glucose device results    Collection Time: 02/13/24  7:56 AM   Result Value Ref Range    POC Glucose, Blood 179 (H) 65 - 99 mg/dL   POCT glucose device results    Collection Time: 02/13/24 11:15 AM   Result Value Ref Range    POC Glucose, Blood 150 (H) 65 - 99 mg/dL       Medications:  Scheduled Medications   Medication Dose Frequency    amLODIPine  5 mg Q DAY    furosemide  60 mg BID DIURETIC     cholestyramine  1 Packet BID    therapeutic multivitamin-minerals  1 Tablet DAILY WITH LUNCH    zinc sulfate  220 mg DAILY WITH LUNCH    ascorbic acid  500 mg BID WITH MEALS    aspirin  81 mg DAILY    senna-docusate  2 Tablet BID    atorvastatin  80 mg Q EVENING    calcium carbonate  500 mg BID WITH MEALS    epoetin  5,000 Units MO, WE + FR    heparin  5,000 Units Q8HRS    insulin GLARGINE  15 Units Q EVENING    insulin regular  3-14 Units 4X/DAY ACHS    omeprazole  20 mg QAM    Pharmacy Consult Request  1 Each PHARMACY TO DOSE     PRN medications: senna-docusate **AND** polyethylene glycol/lytes **AND** bisacodyl, Respiratory Therapy Consult, hydrALAZINE, carboxymethylcellulose, benzocaine-menthol, mag hydrox-al hydrox-simeth, ondansetron **OR** ondansetron, traZODone, sodium chloride, insulin regular **AND** POC blood glucose manual result **AND** NOTIFY MD and PharmD **AND** Administer 20 grams of glucose (approximately 8 ounces of fruit juice) every 15 minutes PRN FSBG less than 70 mg/dL **AND** dextrose bolus, acetaminophen, heparin, lidocaine PF, oxyCODONE immediate-release **OR** oxyCODONE immediate-release    Diet:  Current Diet Order   Procedures    Diet Order Diet: Consistent CHO (Diabetic); Second Modifier: (optional): Renal       Medical Decision Making and Plan:  Left BKA secondary to acute on chronic osteomyelitis in setting of diabetes mellitus  Osteomyelitis resolved with infection control with BKA and s/p IV antibiotics  PT and OT for mobility and ADLs. Per guidelines, 15 hours per week between PT, OT and/or SLP.  Follow-up orthopedics    2/9 erythematous residual limb on admission.  WBC is reassuring and is normal. Despite erythema, not with any excess warmth compared to rest of residual limb. No purulent. No clinical concern for infection at this time. Possible due to gravity dependency when up with therapy and pressure from wrapping. Continue to monitor closely.     2/13 Clinicaly picture 2/12  with good shape an non-concerning exam.      Acute blood loss anemia  Postoperative and CKD, required 1 unit PRBC  Hemoglobin 2/9 is improved at 9.4 --> 8.8 2/12.  Nephrology following     Acute renal failure stage IIIb CKD on HD  S/p right IJ tunneled cath placement 1/29/2024  Hemodialysis Monday, Wednesday, Friday  Fluid positive  2/9 place compression stocking right lower extremity  2/9 discussed with nephrology regarding use of Lasix, initiated.   2/12 HD on HOLD. Hoping for renal recovery.   Nephrology following     Type 2 diabetes mellitus with hyperglycemia, insulin-dependent  Long-term complications causing CKD, peripheral neuropathy, neurogenic bowel  Continue long-acting and short acting insulin  At home had been on Lantus 36 at night with SSI     Hypocalcemia  Continue supplementing 2/12    Hyperphosphatemia  Resolved on admission 2.7    Hyponatremia  Patient has specific diet for his GI system  On dialysis  Nephrology following  Stable low 133 2/12    Elevated TSH  Normal free T4  Continue to monitor     History of CAD s/p ARTURO 9/22/2019  Last cardiology note 7/7/2023: On atorvastatin 80 mg, aspirin 81 mg, carvedilol 3.125 mg twice daily, Jardiance 25 mg daily  Continue aspirin 81 mg daily and statin 80 mg nightly     Hypertension  Last cardiology note 7/7/2023: Previously had been on carvedilol 3.125 mg twice daily and enalapril 10 mg twice daily  2/9 blood pressures running in the 130s-150s. Hold on BP medication initiation, potential diuresis.   2/12 blood pressure elevated.  He is on Lasix 60 mg twice daily. D/w nephrology start amlodipine 5mg daily. Hold on BB due to HR, hold on ACE-I due to hopefully return of renal function. Will need to start reducing Lasix.   2/13 BP better controlled. Increase Lasix to 80mg daily - cleared with nephrology.      Pain  Currently not reporting pain  As needed Tylenol  As needed oxycodone     Skin  Patient at risk for skin breakdown due to debility in areas  including sacrum, achilles, elbows and head in addition to other sites. Nursing to assess skin daily.      GI Ppx  Patient on Prilosec for GERD prophylaxis.      Bowel   Neurogenic bowel secondary to long-term complications from diabetes  Colestyramine at 10 AM and 5 PM  Follows with GI  Patient on Senna-docusate for constipation prophylaxis.   Abnormal frequency of bowel movements at baseline.  Can go 5 to 7 days.     Bladder  TV/PVR/BS PRN   PVRs in 200's, likely positional. Monitor     Upcoming Labs/imaging: Labs per nephrology.      DVT PROPHYLAXIS: Heparin 5000 units q8 hrs     HOSPITALIST FOLLOWING: No. Nephrology following - d/w nephrology 2/13     CODE STATUS: FULL CODE     DISPO: Home with supportive parents.      LISE: TBD     ADDITIONAL MEDICAL NEEDS ON D/C (IV abx, O2, etc): TBD     MEDS SENT TO: TBD     DISCHARGE SPECIALIST FOLLOW UP: Orthopedics ([facility] Segura)     Patient to scheduled follow up with their PCP within 2 weeks from discharge from the [facility].      ____________________________________    Dr. Mena Ny DO, MS  [facility] - Physical Medicine & Rehabilitation   ____________________________________

## 2024-02-13 NOTE — THERAPY
"Occupational Therapy  Daily Treatment     Patient Name: Beni Moore  Age:  53 y.o., Sex:  male  Medical Record #: [id number]  Today's Date: 2/13/2024     Precautions  Precautions: Fall Risk, Non Weight Bearing Left Lower Extremity, Immobilizer Left Lower Extremity  Comments: L BKA         Subjective    \" My shoulder feels a lot more relaxed since we did those stretches\" Pt was seated in w/c upon arrival and agreeable for 2nd OT session.      Objective       02/13/24 1431   OT Charge Group   OT Therapeutic Exercise (Units) 2   OT Total Time Spent   OT Individual Total Time Spent (Mins) 30   Supine Upper Body Exercises   Front Arm Raise 3 sets of 10;Bilateral  (while holding onto non weighted stick and 5sec holds to increase shoulder flex ROM)   Other Exercise horizontal abd/adduction using non weighted stick for stretching. 3 set of 10 w/ 5 sec holds   Comments low load pec stretchng w/ towel placed on spine of back   Interdisciplinary Plan of Care Collaboration   Patient Position at End of Therapy Seated;Chair Alarm On;Call Light within Reach;Tray Table within Reach;Phone within Reach     SPV for slide transfer w/out slide board from w/c<>EOM. Pt was agreeable for more stretching while supine to increase ROM on LUE for functional tasks. Needed VC for relaxation tech and pacing. Pt was able to reach approximately 95 degrees in shoulder flexion while seated in w/c.     Assessment       Pt tolerated session well w/ focus on thera ex and increase UE ROM for functional tasks. Pt was receptive and motivated to improve.  Strengths: Able to follow instructions, Alert and oriented, Effective communication skills, Good endurance, Good insight into deficits/needs, Independent prior level of function, Motivated for self care and independence, Pleasant and cooperative, Supportive family, Willingly participates in therapeutic activities  Barriers: Decreased endurance, Fatigue, Generalized weakness, Impaired activity tolerance, " Impaired balance, Limited mobility    Plan    ADLs  Fxl txfrs   UE strengthening/ROM    DME  OT DME Recommendations  Bathroom Equipment:  (Grab bars in shower)    Passport items to be completed:  Perform bathroom transfers, complete dressing, complete feeding, get ready for the day, prepare a simple meal, participate in household tasks, adapt home for safety needs, demonstrate home exercise program, complete caregiver training     Occupational Therapy Goals (Active)       Problem: Bathing       Dates: Start:  02/09/24         Goal: STG-Within one week, patient will bathe w/ CGA and AE/DME as needed       Dates: Start:  02/09/24               Problem: Dressing       Dates: Start:  02/09/24         Goal: STG-Within one week, patient will dress LB w/ min A and AE as needed       Dates: Start:  02/09/24               Problem: Functional Transfers       Dates: Start:  02/09/24         Goal: STG-Within one week, patient will consistently transfer w/ CGA and DME as needed       Dates: Start:  02/09/24               Problem: IADL's       Dates: Start:  02/09/24         Goal: STG-Within one week, patient will access kitchen area w/ min A and LRAD       Dates: Start:  02/09/24               Problem: OT Long Term Goals       Dates: Start:  02/09/24         Goal: LTG-By discharge, patient will complete basic self care tasks w/ mod I/supervision and AE/DME as needed       Dates: Start:  02/09/24            Goal: LTG-By discharge, patient will complete basic home management w/ supervision and LRAD       Dates: Start:  02/09/24               Problem: Toileting       Dates: Start:  02/09/24         Goal: STG-Within one week, patient will complete toileting tasks w/ min A and AE/DME as needed       Dates: Start:  02/09/24

## 2024-02-13 NOTE — THERAPY
Occupational Therapy  Daily Treatment     Patient Name: Beni Moore  Age:  53 y.o., Sex:  male  Medical Record #: [id number]  Today's Date: 2/13/2024     Precautions  Precautions: (P) Fall Risk, Non Weight Bearing Left Lower Extremity, Immobilizer Left Lower Extremity  Comments: (P) L BKA         Subjective    Pt encountered for OT in room with family at bedside. Pleasant and agreeable to participate. MD present briefly during session for rounds.      Objective       02/13/24 1401   OT Charge Group   OT Self Care / ADL (Units) 1   OT Therapeutic Exercise (Units) 1   OT Total Time Spent   OT Individual Total Time Spent (Mins) 30   Precautions   Precautions Fall Risk;Non Weight Bearing Left Lower Extremity;Immobilizer Left Lower Extremity   Comments L BKA   Interdisciplinary Plan of Care Collaboration   IDT Collaboration with  Physician;Family / Caregiver   Patient Position at End of Therapy Seated;Chair Alarm On;Call Light within Reach;Tray Table within Reach;Phone within Reach   Collaboration Comments CLOF     Provided pt LLE compression garment (Large above knee; INT with MD) to manage LE edema. TA to don d/t compression stocking tightness.     ~10 week post-op L proximal humerus fx with ORIF (12/06); Reviewed and provided pt handout for LUE shoulder AROM (all planes; 3x/day, 2x10, as tolerated) to decrease stiffness. Pt currently at ~90 shoulder active flex/abd. Pt with good understanding of AROM exercises via return demo w/o c/o pain.      Assessment    Pt with significant edema along RLE and fitted with L above knee compression garment, per MD orders, to manage swelling. Good tolerance/comfort during compression garment donning and good understanding of AROM shoudler exercises to reduce stiffness from post-op immobilization.    Strengths: Able to follow instructions, Alert and oriented, Effective communication skills, Good endurance, Good insight into deficits/needs, Independent prior level of function,  Motivated for self care and independence, Pleasant and cooperative, Supportive family, Willingly participates in therapeutic activities  Barriers: Decreased endurance, Fatigue, Generalized weakness, Impaired activity tolerance, Impaired balance, Limited mobility    Plan    Fxl txfrs   UE strengthening/ROM    DME  OT DME Recommendations  Bathroom Equipment:  (Grab bars in shower)    Passport items to be completed:  Perform bathroom transfers, complete dressing, complete feeding, get ready for the day, prepare a simple meal, participate in household tasks, adapt home for safety needs, demonstrate home exercise program, complete caregiver training     Occupational Therapy Goals (Active)       Problem: Bathing       Dates: Start:  02/09/24         Goal: STG-Within one week, patient will bathe w/ CGA and AE/DME as needed       Dates: Start:  02/09/24               Problem: Dressing       Dates: Start:  02/09/24         Goal: STG-Within one week, patient will dress LB w/ min A and AE as needed       Dates: Start:  02/09/24               Problem: Functional Transfers       Dates: Start:  02/09/24         Goal: STG-Within one week, patient will consistently transfer w/ CGA and DME as needed       Dates: Start:  02/09/24               Problem: IADL's       Dates: Start:  02/09/24         Goal: STG-Within one week, patient will access kitchen area w/ min A and LRAD       Dates: Start:  02/09/24               Problem: OT Long Term Goals       Dates: Start:  02/09/24         Goal: LTG-By discharge, patient will complete basic self care tasks w/ mod I/supervision and AE/DME as needed       Dates: Start:  02/09/24            Goal: LTG-By discharge, patient will complete basic home management w/ supervision and LRAD       Dates: Start:  02/09/24               Problem: Toileting       Dates: Start:  02/09/24         Goal: STG-Within one week, patient will complete toileting tasks w/ min A and AE/DME as needed       Dates: Start:   02/09/24

## 2024-02-13 NOTE — THERAPY
Occupational Therapy  Daily Treatment     Patient Name: Beni Moore  Age:  53 y.o., Sex:  male  Medical Record #: [id number]  Today's Date: 2/13/2024     Precautions  Precautions: Fall Risk, Non Weight Bearing Left Lower Extremity, Immobilizer Left Lower Extremity  Comments: ARCENIO LYNN         Subjective    Pt was seated in w/c upon arrival and agreeable for OT session. Pt communicated to therapist that he had left shoulder sx in early feb and had a difficult time lifting past 90 degrees in shoulder flex and abduction w/out pain and discomfort.      Objective       02/13/24 1001   OT Charge Group   OT Therapeutic Exercise (Units) 4   OT Total Time Spent   OT Individual Total Time Spent (Mins) 60   Sitting Upper Body Exercises   Front Arm Raise 3 sets of 10;Bilateral   Side Arm Raise 3 sets of 10;Bilateral   Bilateral Row 3 sets of 10;Bilateral   Upper Extremity Bike Level 4 Resistance  (~15min)   Other Exercise low load stretching on BUE. See note   Sitting Lower Body Exercises   Long Arc Quad 3 sets of 10;Weight (See Comments for lbs);Right  (5lbs ankle weight)   Interdisciplinary Plan of Care Collaboration   Patient Position at End of Therapy Seated;Chair Alarm On;Call Light within Reach;Tray Table within Reach;Phone within Reach     SBA for sideboard transfer from w/c<>EOM.   Thera-ex. Instructed pt in UB exercises w/out weight to increase joint mob in LUE. Needed VC do not compensate during front and side arm raises after reps 8-10. Pt was edu w/ demo on simple UB stretching while supine in bed and in chair in attempt to increase LUE ROM. Pt was able to reach approximately up to ~100+ degrees supine w/out increase discomfort or pain post stretching.        Assessment    Pt tolerated session well w/ focus on thera ex and increase UE ROM for functional tasks. Pt was receptive and motivated to improve.   Strengths: Able to follow instructions, Alert and oriented, Effective communication skills, Good endurance, Good  insight into deficits/needs, Independent prior level of function, Motivated for self care and independence, Pleasant and cooperative, Supportive family, Willingly participates in therapeutic activities  Barriers: Decreased endurance, Fatigue, Generalized weakness, Impaired activity tolerance, Impaired balance, Limited mobility    Plan    Fxl txfrs   UE strengthening/ROM    DME  OT DME Recommendations  Bathroom Equipment:  (Grab bars in shower)    Passport items to be completed:  Perform bathroom transfers, complete dressing, complete feeding, get ready for the day, prepare a simple meal, participate in household tasks, adapt home for safety needs, demonstrate home exercise program, complete caregiver training     Occupational Therapy Goals (Active)       Problem: Bathing       Dates: Start:  02/09/24         Goal: STG-Within one week, patient will bathe w/ CGA and AE/DME as needed       Dates: Start:  02/09/24               Problem: Dressing       Dates: Start:  02/09/24         Goal: STG-Within one week, patient will dress LB w/ min A and AE as needed       Dates: Start:  02/09/24               Problem: Functional Transfers       Dates: Start:  02/09/24         Goal: STG-Within one week, patient will consistently transfer w/ CGA and DME as needed       Dates: Start:  02/09/24               Problem: IADL's       Dates: Start:  02/09/24         Goal: STG-Within one week, patient will access kitchen area w/ min A and LRAD       Dates: Start:  02/09/24               Problem: OT Long Term Goals       Dates: Start:  02/09/24         Goal: LTG-By discharge, patient will complete basic self care tasks w/ mod I/supervision and AE/DME as needed       Dates: Start:  02/09/24            Goal: LTG-By discharge, patient will complete basic home management w/ supervision and LRAD       Dates: Start:  02/09/24               Problem: Toileting       Dates: Start:  02/09/24         Goal: STG-Within one week, patient will complete  toileting tasks w/ min A and AE/DME as needed       Dates: Start:  02/09/24

## 2024-02-13 NOTE — THERAPY
Physical Therapy   Daily Treatment     Patient Name: Beni Moore  Age:  53 y.o., Sex:  male  Medical Record #: [id number]  Today's Date: 2/13/2024     Precautions  Precautions: Fall Risk, Non Weight Bearing Left Lower Extremity, Immobilizer Left Lower Extremity  Comments: L BKA    Subjective    Pt was seated in w/c upon arrival and agreeable to treatment.  Pt reported increased soreness in his L shoulder from yesterday's session.       Objective       02/13/24 0931   PT Charge Group   PT Therapeutic Activities (Units) 2   PT Total Time Spent   PT Individual Total Time Spent (Mins) 30   Precautions   Precautions Fall Risk;Non Weight Bearing Left Lower Extremity;Immobilizer Left Lower Extremity   Interdisciplinary Plan of Care Collaboration   Patient Position at End of Therapy Seated;Other (Comments)  (Hand off to OT)     Time spent this session with pt reviewing HC and PMH.  Discussion of L shoulder surgery.  Reviewed POC, goals.  Discussion of transfer mechanics, specifically uphill SB transfers.      Assessment    Pt verbalized all education.  Will attempt to contact shoulder surgeon to clarify WBing/ROM restrictions (Dr. Aris Pierre)    Strengths: Able to follow instructions, Alert and oriented, Effective communication skills, Good carryover of learning, Good insight into deficits/needs, Independent prior level of function, Motivated for self care and independence, Pleasant and cooperative, Supportive family, Willingly participates in therapeutic activities  Barriers: Fatigue, Generalized weakness, Impaired activity tolerance, Impaired balance, Pain    Plan    Continue with mat program, emphasize prone positioning for stretching,AAROM/ PROM and stretching to L shoulder, transfer training from various heights, car transfer, WC mobility outdoors, standing with FWW.        DME  PT DME Recommendations  Wheelchair:  (Pt already has his own WC and slideboard)  Assistive Device: Front Wheeled Walker     Passport  items to be completed:  Get in/out of bed safely, in/out of a vehicle, safely use mobility device, walk or wheel around home/community, navigate up and down stairs, show how to get up/down from the ground, ensure home is accessible, demonstrate HEP, complete caregiver training    Physical Therapy Problems (Active)       Problem: Balance       Dates: Start:  02/09/24         Goal: STG-Within one week, patient will maintain static standing in parallel bars Gina with single UE support for 10 seconds in order to facilitate completion of standing ADLs       Dates: Start:  02/09/24               Problem: Mobility       Dates: Start:  02/09/24         Goal: STG-Within one week, patient will propel wheelchair community distances of at least 150' independently       Dates: Start:  02/09/24               Problem: Mobility Transfers       Dates: Start:  02/09/24         Goal: STG-Within one week, patient will sit to stand from WC Gina with UE pushing from WC        Dates: Start:  02/09/24            Goal: STG-Within one week, patient will transfer bed to chair CGA        Dates: Start:  02/09/24               Problem: PT-Long Term Goals       Dates: Start:  02/09/24         Goal: LTG-By discharge, patient will tolerate standing with intermittent UE support for at least 1 minute CGA in order to facilitate standing ADLs        Dates: Start:  02/09/24            Goal: LTG-By discharge, patient will perform squat pivot transfer from one surface to another SPV       Dates: Start:  02/09/24            Goal: LTG-By discharge, patient will perform home exercise program independently        Dates: Start:  02/09/24            Goal: LTG-By discharge, patient will transfer in/out of a car using LRAD or no AD CGA       Dates: Start:  02/09/24

## 2024-02-14 ENCOUNTER — APPOINTMENT (OUTPATIENT)
Dept: PHYSICAL THERAPY | Facility: REHABILITATION | Age: 54
DRG: 559 | End: 2024-02-14
Attending: PHYSICAL MEDICINE & REHABILITATION
Payer: MEDICARE

## 2024-02-14 ENCOUNTER — APPOINTMENT (OUTPATIENT)
Dept: OCCUPATIONAL THERAPY | Facility: REHABILITATION | Age: 54
DRG: 559 | End: 2024-02-14
Attending: PHYSICAL MEDICINE & REHABILITATION
Payer: MEDICARE

## 2024-02-14 LAB
ANION GAP SERPL CALC-SCNC: 10 MMOL/L (ref 7–16)
BUN SERPL-MCNC: 54 MG/DL (ref 8–22)
CALCIUM SERPL-MCNC: 7.6 MG/DL (ref 8.5–10.5)
CHLORIDE SERPL-SCNC: 102 MMOL/L (ref 96–112)
CO2 SERPL-SCNC: 18 MMOL/L (ref 20–33)
CREAT SERPL-MCNC: 2.94 MG/DL (ref 0.5–1.4)
ERYTHROCYTE [DISTWIDTH] IN BLOOD BY AUTOMATED COUNT: 49.1 FL (ref 35.9–50)
GFR SERPLBLD CREATININE-BSD FMLA CKD-EPI: 24 ML/MIN/1.73 M 2
GLUCOSE BLD STRIP.AUTO-MCNC: 105 MG/DL (ref 65–99)
GLUCOSE BLD STRIP.AUTO-MCNC: 141 MG/DL (ref 65–99)
GLUCOSE BLD STRIP.AUTO-MCNC: 162 MG/DL (ref 65–99)
GLUCOSE BLD STRIP.AUTO-MCNC: 185 MG/DL (ref 65–99)
GLUCOSE SERPL-MCNC: 139 MG/DL (ref 65–99)
HCT VFR BLD AUTO: 27.9 % (ref 42–52)
HGB BLD-MCNC: 9 G/DL (ref 14–18)
MCH RBC QN AUTO: 29.3 PG (ref 27–33)
MCHC RBC AUTO-ENTMCNC: 32.3 G/DL (ref 32.3–36.5)
MCV RBC AUTO: 90.9 FL (ref 81.4–97.8)
PLATELET # BLD AUTO: 281 K/UL (ref 164–446)
PMV BLD AUTO: 10 FL (ref 9–12.9)
POTASSIUM SERPL-SCNC: 4.8 MMOL/L (ref 3.6–5.5)
RBC # BLD AUTO: 3.07 M/UL (ref 4.7–6.1)
SODIUM SERPL-SCNC: 130 MMOL/L (ref 135–145)
WBC # BLD AUTO: 7 K/UL (ref 4.8–10.8)

## 2024-02-14 PROCEDURE — 99233 SBSQ HOSP IP/OBS HIGH 50: CPT | Performed by: PHYSICAL MEDICINE & REHABILITATION

## 2024-02-14 PROCEDURE — 80048 BASIC METABOLIC PNL TOTAL CA: CPT

## 2024-02-14 PROCEDURE — 85027 COMPLETE CBC AUTOMATED: CPT

## 2024-02-14 PROCEDURE — 700102 HCHG RX REV CODE 250 W/ 637 OVERRIDE(OP): Performed by: PHYSICAL MEDICINE & REHABILITATION

## 2024-02-14 PROCEDURE — 700111 HCHG RX REV CODE 636 W/ 250 OVERRIDE (IP): Performed by: PHYSICAL MEDICINE & REHABILITATION

## 2024-02-14 PROCEDURE — 97530 THERAPEUTIC ACTIVITIES: CPT

## 2024-02-14 PROCEDURE — 770010 HCHG ROOM/CARE - REHAB SEMI PRIVAT*

## 2024-02-14 PROCEDURE — A9270 NON-COVERED ITEM OR SERVICE: HCPCS | Performed by: PHYSICAL MEDICINE & REHABILITATION

## 2024-02-14 PROCEDURE — 97110 THERAPEUTIC EXERCISES: CPT

## 2024-02-14 PROCEDURE — 97112 NEUROMUSCULAR REEDUCATION: CPT

## 2024-02-14 PROCEDURE — 82962 GLUCOSE BLOOD TEST: CPT | Mod: 91

## 2024-02-14 PROCEDURE — 97535 SELF CARE MNGMENT TRAINING: CPT

## 2024-02-14 RX ORDER — FUROSEMIDE 10 MG/ML
80 INJECTION INTRAMUSCULAR; INTRAVENOUS
Status: DISCONTINUED | OUTPATIENT
Start: 2024-02-15 | End: 2024-02-18

## 2024-02-14 RX ORDER — ALBUMIN (HUMAN) 12.5 G/50ML
12.5 SOLUTION INTRAVENOUS
Status: DISCONTINUED | OUTPATIENT
Start: 2024-02-15 | End: 2024-02-19

## 2024-02-14 RX ORDER — ALBUMIN (HUMAN) 12.5 G/50ML
12.5 SOLUTION INTRAVENOUS
Status: DISCONTINUED | OUTPATIENT
Start: 2024-02-14 | End: 2024-02-14

## 2024-02-14 RX ADMIN — HEPARIN SODIUM 5000 UNITS: 5000 INJECTION, SOLUTION INTRAVENOUS; SUBCUTANEOUS at 21:01

## 2024-02-14 RX ADMIN — CALCIUM 500 MG: 500 TABLET ORAL at 17:43

## 2024-02-14 RX ADMIN — TRAZODONE HYDROCHLORIDE 50 MG: 50 TABLET ORAL at 21:01

## 2024-02-14 RX ADMIN — OXYCODONE HYDROCHLORIDE AND ACETAMINOPHEN 500 MG: 500 TABLET ORAL at 17:32

## 2024-02-14 RX ADMIN — HEPARIN SODIUM 5000 UNITS: 5000 INJECTION, SOLUTION INTRAVENOUS; SUBCUTANEOUS at 14:31

## 2024-02-14 RX ADMIN — Medication 220 MG: at 14:31

## 2024-02-14 RX ADMIN — ATORVASTATIN CALCIUM 80 MG: 40 TABLET, FILM COATED ORAL at 21:01

## 2024-02-14 RX ADMIN — INSULIN HUMAN 3 UNITS: 100 INJECTION, SOLUTION PARENTERAL at 17:35

## 2024-02-14 RX ADMIN — HEPARIN SODIUM 5000 UNITS: 5000 INJECTION, SOLUTION INTRAVENOUS; SUBCUTANEOUS at 05:10

## 2024-02-14 RX ADMIN — CHOLESTYRAMINE 4 G: 4 POWDER, FOR SUSPENSION ORAL at 17:33

## 2024-02-14 RX ADMIN — OXYCODONE HYDROCHLORIDE AND ACETAMINOPHEN 500 MG: 500 TABLET ORAL at 05:10

## 2024-02-14 RX ADMIN — OMEPRAZOLE 20 MG: 20 CAPSULE, DELAYED RELEASE ORAL at 07:54

## 2024-02-14 RX ADMIN — FUROSEMIDE 80 MG: 40 TABLET ORAL at 05:10

## 2024-02-14 RX ADMIN — INSULIN HUMAN 3 UNITS: 100 INJECTION, SOLUTION PARENTERAL at 21:11

## 2024-02-14 RX ADMIN — FUROSEMIDE 80 MG: 40 TABLET ORAL at 17:32

## 2024-02-14 RX ADMIN — INSULIN GLARGINE-YFGN 15 UNITS: 100 INJECTION, SOLUTION SUBCUTANEOUS at 21:10

## 2024-02-14 RX ADMIN — ASPIRIN 81 MG: 81 TABLET, COATED ORAL at 05:10

## 2024-02-14 RX ADMIN — CALCIUM 500 MG: 500 TABLET ORAL at 07:54

## 2024-02-14 RX ADMIN — CHOLESTYRAMINE 4 G: 4 POWDER, FOR SUSPENSION ORAL at 10:37

## 2024-02-14 RX ADMIN — AMLODIPINE BESYLATE 5 MG: 5 TABLET ORAL at 05:10

## 2024-02-14 RX ADMIN — Medication 1 TABLET: at 14:31

## 2024-02-14 ASSESSMENT — ACTIVITIES OF DAILY LIVING (ADL)
TOILET_TRANSFER_DESCRIPTION: ADAPTIVE EQUIPMENT;INCREASED TIME;SET-UP OF EQUIPMENT;SUPERVISION FOR SAFETY;VERBAL CUEING
BED_CHAIR_WHEELCHAIR_TRANSFER_DESCRIPTION: ADAPTIVE EQUIPMENT;SQUAT PIVOT TRANSFER TO WHEELCHAIR;SUPERVISION FOR SAFETY
BED_CHAIR_WHEELCHAIR_TRANSFER_DESCRIPTION: ADAPTIVE EQUIPMENT;SQUAT PIVOT TRANSFER TO WHEELCHAIR;SUPERVISION FOR SAFETY

## 2024-02-14 ASSESSMENT — PAIN DESCRIPTION - PAIN TYPE: TYPE: ACUTE PAIN

## 2024-02-14 NOTE — PROGRESS NOTES
NURSING DAILY NOTE    Name: Beni Moore   Date of Admission: 2/8/2024   Admitting Diagnosis: Hx of BKA, left (HCC)  Attending Physician: Mena Ny D.o.  Allergies: Patient has no known allergies.    Safety  Patient Assist  mod assist  Patient Precautions  Fall Risk, Non Weight Bearing Left Lower Extremity, Immobilizer Left Lower Extremity  Precaution Comments  L BKA; L proximal humerus fx with ORIF (sx 12/06)  Bed Transfer Status  Standby Assist  Toilet Transfer Status   Minimal Assist  Assistive Devices  Wheelchair  Oxygen  None - Room Air  Diet/Therapeutic Dining  Current Diet Order   Procedures    Diet Order Diet: Consistent CHO (Diabetic); Second Modifier: (optional): Renal     Pill Administration  whole  Agitated Behavioral Scale     ABS Level of Severity       Fall Risk  Has the patient had a fall this admission?   No  Florecita Roldan Fall Risk Scoring  22, HIGH RISK  Fall Risk Safety Measures  bed alarm, chair alarm, and poor balance    Vitals  Temperature: 36.3 °C (97.4 °F)  Temp src: Oral  Pulse: 74  Respiration: 16  Blood Pressure: (!) 162/85 (rn notified)  Blood Pressure MAP (Calculated): 111 MM HG  BP Location: Left, Upper Arm  Patient BP Position: Sitting     Oxygen  Pulse Oximetry: 99 %  O2 (LPM): 0  O2 Delivery Device: None - Room Air    Bowel and Bladder  Last Bowel Movement   (Refused bowel meds despite education.)  Stool Type     Bowel Device     Continent  Bladder: Continent void   Bowel: Continent movement  Bladder Function  Urine Void (mL): 700 ml (urinals)  Number of Times Voided: 1  Urine Color: Yellow  Genitourinary Assessment   Bladder Assessment (WDL):  WDL Except  Farrell Catheter: Not Applicable  Urinary Elimination:  (able to void)  Urine Color: Yellow  Time Void: Yes  Bladder Scan: Post Void  $ Bladder Scan Results (mL): 78    Skin  Johnny Score   18  Sensory Interventions   Bed Types: Standard/Trauma Mattress with  Overlay  Skin Preventative Measures: Pillows in Use for Support / Positioning, Seat Cushion in Use on Chair when Out of Bed  Moisture Interventions  Moisturizers/Barriers: Barrier Wipes      Pain  Pain Rating Scale  0 - No Pain  Pain Location  Shoulder  Pain Location Orientation  Left  Pain Interventions   Declines    ADLs    Bathing   Shower, * * With Assistance from, Staff  Linen Change      Personal Hygiene     Chlorhexidine Bath      Oral Care     Teeth/Dentures     Shave     Nutrition Percentage Eaten  Lunch, Between 50-75% Consumed  Environmental Precautions  Bed in Low Position, Treaded Slipper Socks on Patient  Patient Turns/Positioning  Patient Turns Self from Side to Side  Patient Turns Assistance/Tolerance  Assistance of One, Tolerates Well, General Weakness  Bed Positions  Bed Controls On  Head of Bed Elevated  Self regulated      Psychosocial/Neurologic Assessment  Psychosocial Assessment  Psychosocial (WDL):  Within Defined Limits  Neurologic Assessment  Neuro (WDL): Exceptions to WDL  Level of Consciousness: Alert  Orientation Level: Oriented X4  Cognition: Follows commands, Appropriate attention/concentration  Speech: Clear  EENT (WDL):  WDL Except    Cardio/Pulmonary Assessment  Edema   LLE Edema: 1+  Respiratory Breath Sounds  RUL Breath Sounds: Clear  RML Breath Sounds: Clear  RLL Breath Sounds: Clear  CHRIS Breath Sounds: Clear  LLL Breath Sounds: Clear  Cardiac Assessment   Cardiac (WDL):  WDL Except

## 2024-02-14 NOTE — PROGRESS NOTES
Nephrology note  Documentation reviewed  Elevated BP improved  Volume -to increase Lasix to 80 mg BID  Creat stable  Holding HD  Will follow

## 2024-02-14 NOTE — THERAPY
"Occupational Therapy  Daily Treatment     Patient Name: Beni Moore  Age:  53 y.o., Sex:  male  Medical Record #: [id number]  Today's Date: 2/14/2024     Precautions  Precautions: Fall Risk, Non Weight Bearing Left Lower Extremity, Immobilizer Left Lower Extremity  Comments: L BKA; L proximal humerus fx with ORIF (sx 12/06)         Subjective    \"They started giving me double the does of lasiks last night, so that should help get some of this water weight off me.\"      Objective       02/14/24 1331   OT Charge Group   OT Therapy Activity (Units) 1   OT Therapeutic Exercise (Units) 1   OT Total Time Spent   OT Individual Total Time Spent (Mins) 30   Cognition    Level of Consciousness Alert   Sitting Lower Body Exercises   Nustep Time (See Comments)  (10 min on nu-step level 1; 43 spm and .21 total steps; RLE only used- LLE placed on stool with BUE)   Interdisciplinary Plan of Care Collaboration   Patient Position at End of Therapy Seated  (wheeled himself to dining room)         Assessment    Pt tolerated session well. Pt reported fatigue in BUE due to stretching with therapy early and working on standing bal/tolerance. Pt able to wheel himself down to therapy gym using BUE. Pt reported wanting to work on LB strengthening- attempted motomed but LLE unable to clear pedal- switched to nu-step- pt able to xfer with SBA without use of SB. Pt limited by L shoulder, L BKA and missing toes on RLE- plans to get new shoe this week to assist with balance and hopping.     Strengths: Able to follow instructions, Alert and oriented, Effective communication skills, Good endurance, Good insight into deficits/needs, Independent prior level of function, Motivated for self care and independence, Pleasant and cooperative, Supportive family, Willingly participates in therapeutic activities  Barriers: Decreased endurance, Fatigue, Generalized weakness, Impaired activity tolerance, Impaired balance, Limited mobility    Plan    Pt had " good tolerance to session with focus on blocked bathroom txfr training and STS progression. SAM managed conservatively during this session with cues for minimal weight bearing. He required the mat table to be elevated due to poor standing tolerance.      Strengths: Able to follow instructions, Alert and oriented, Effective communication skills, Good endurance, Good insight into deficits/needs, Independent prior level of function, Motivated for self care and independence, Pleasant and cooperative, Supportive family, Willingly participates in therapeutic activities  Barriers: Decreased endurance, Fatigue, Generalized weakness, Impaired activity tolerance, Impaired balance, Limited mobility     Plan  Shower tomorrow 2/15 with primary OT      DME  OT DME Recommendations  Bathroom Equipment: (P)  (Pt has acquired necessary DME for bathroom to support safety and independence with ADLs.)     Passport items to be completed:  Perform bathroom transfers, complete dressing, complete feeding, get ready for the day, prepare a simple meal, participate in household tasks, adapt home for safety needs, demonstrate home exercise program, complete caregiver training     Occupational Therapy Goals (Active)       Problem: Bathing       Dates: Start:  02/09/24         Goal: STG-Within one week, patient will bathe w/ CGA and AE/DME as needed       Dates: Start:  02/09/24               Problem: Dressing       Dates: Start:  02/09/24         Goal: STG-Within one week, patient will dress LB w/ min A and AE as needed       Dates: Start:  02/09/24               Problem: IADL's       Dates: Start:  02/09/24         Goal: STG-Within one week, patient will access kitchen area w/ min A and LRAD       Dates: Start:  02/09/24               Problem: OT Long Term Goals       Dates: Start:  02/09/24         Goal: LTG-By discharge, patient will complete basic self care tasks w/ mod I/supervision and AE/DME as needed       Dates: Start:  02/09/24             Goal: LTG-By discharge, patient will complete basic home management w/ supervision and LRAD       Dates: Start:  02/09/24               Problem: Toileting       Dates: Start:  02/09/24         Goal: STG-Within one week, patient will complete toileting tasks w/ min A and AE/DME as needed       Dates: Start:  02/09/24

## 2024-02-14 NOTE — CARE PLAN
Problem: Bathing  Goal: STG-Within one week, patient will bathe w/ CGA and AE/DME as needed  Outcome: Progressing     Problem: Dressing  Goal: STG-Within one week, patient will dress LB w/ min A and AE as needed  Outcome: Progressing     Problem: IADL's  Goal: STG-Within one week, patient will access kitchen area w/ min A and LRAD  Outcome: Progressing     Problem: Toileting  Goal: STG-Within one week, patient will complete toileting tasks w/ min A and AE/DME as needed  Outcome: Progressing     Problem: Functional Transfers  Goal: STG-Within one week, patient will consistently transfer w/ CGA and DME as needed  Outcome: Met

## 2024-02-14 NOTE — CARE PLAN
Problem: Mobility Transfers  Goal: STG-Within one week, patient will sit to stand from WC Gina with UE pushing from WC   Outcome: Not Met     Problem: Balance  Goal: STG-Within one week, patient will maintain static standing in parallel bars Gina with single UE support for 10 seconds in order to facilitate completion of standing ADLs  Outcome: Progressing     Problem: Mobility  Goal: STG-Within one week, patient will propel wheelchair community distances of at least 150' independently  Outcome: Progressing     Problem: Mobility Transfers  Goal: STG-Within one week, patient will transfer bed to chair CGA   Outcome: Met

## 2024-02-14 NOTE — THERAPY
Occupational Therapy  Daily Treatment     Patient Name: Beni Moore  Age:  53 y.o., Sex:  male  Medical Record #: [id number]  Today's Date: 2/14/2024     Precautions  Precautions: Fall Risk, Non Weight Bearing Left Lower Extremity, Immobilizer Left Lower Extremity  Comments: L BKA; L proximal humerus fx with ORIF (sx 12/06)         Subjective  Pt seated in w/c upon arrival. Endorses no discomfort in BUE. Pleasant and agreeable to OT session.      Objective     02/14/24 1031   OT Charge Group   OT Self Care / ADL (Units) 4   OT Total Time Spent   OT Individual Total Time Spent (Mins) 60   Functional Level of Assist   Toilet Transfers Minimal Assist  (CGA-min A lateral scoot txfr w/c <> drop arm commode)   Toilet Transfer Description Adaptive equipment;Increased time;Set-up of equipment;Supervision for safety;Verbal cueing   Interdisciplinary Plan of Care Collaboration   IDT Collaboration with  Physician;Nursing   Patient Position at End of Therapy Seated  (in dining room for lunch)   Collaboration Comments Dr. Anant hanna; RN med pass at start of session   OT DME Recommendations   Bathroom Equipment   (Pt has acquired necessary DME for bathroom to support safety and independence with ADLs.)     Pt completed blocked txfr training to support safety and independence with toilet txfrs. He will require drop arm commode. Trialed use of bariatric drop arm commode and standard drop arm commode; pt required SBA-min A for lateral scoot txfrs.      Wrapped FWW handles in sports tape for comfort.     Pt completed x4 STS with FWW from elevated mat table with min A for STS and standing balance. Reached for object anteriorly with LUE in standing with min A for standing balance, unable to reach for objects posteriorly.      Assessment  Pt had good tolerance to session with focus on blocked bathroom txfr training and STS progression. LUE managed conservatively during this session with cues for minimal weight bearing. He required  the mat table to be elevated due to poor standing tolerance.     Strengths: Able to follow instructions, Alert and oriented, Effective communication skills, Good endurance, Good insight into deficits/needs, Independent prior level of function, Motivated for self care and independence, Pleasant and cooperative, Supportive family, Willingly participates in therapeutic activities  Barriers: Decreased endurance, Fatigue, Generalized weakness, Impaired activity tolerance, Impaired balance, Limited mobility    Plan  Shower tomorrow 2/15 with primary OT     DME  OT DME Recommendations  Bathroom Equipment: (P)  (Pt has acquired necessary DME for bathroom to support safety and independence with ADLs.)    Passport items to be completed:  Perform bathroom transfers, complete dressing, complete feeding, get ready for the day, prepare a simple meal, participate in household tasks, adapt home for safety needs, demonstrate home exercise program, complete caregiver training     Occupational Therapy Goals (Active)       Problem: Bathing       Dates: Start:  02/09/24         Goal: STG-Within one week, patient will bathe w/ CGA and AE/DME as needed       Dates: Start:  02/09/24               Problem: Dressing       Dates: Start:  02/09/24         Goal: STG-Within one week, patient will dress LB w/ min A and AE as needed       Dates: Start:  02/09/24               Problem: IADL's       Dates: Start:  02/09/24         Goal: STG-Within one week, patient will access kitchen area w/ min A and LRAD       Dates: Start:  02/09/24               Problem: OT Long Term Goals       Dates: Start:  02/09/24         Goal: LTG-By discharge, patient will complete basic self care tasks w/ mod I/supervision and AE/DME as needed       Dates: Start:  02/09/24            Goal: LTG-By discharge, patient will complete basic home management w/ supervision and LRAD       Dates: Start:  02/09/24               Problem: Toileting       Dates: Start:  02/09/24          Goal: STG-Within one week, patient will complete toileting tasks w/ min A and AE/DME as needed       Dates: Start:  02/09/24

## 2024-02-14 NOTE — PROGRESS NOTES
Physical Medicine & Rehabilitation Progress Note    Encounter Date: 2/14/2024    Chief Complaint: Swelling better    Interval Events (Subjective):  Vital signs: Blood pressure still intermittently elevated into the 150s, 160s but does have normal readings.  Voiding volitionally  No bowel movement    Patient seen and examined in his room. Working with therapy. Swelling better. Discussed awaiting labs. He is amenable. States therapy unwrapped limb and it looked good. Healing well.       ROS: 14 point ROS negative unless otherwise specified in the HPI    Objective:  VITAL SIGNS: /72   Pulse 74   Temp 36.8 °C (98.3 °F) (Oral)   Resp 18   Ht 1.829 m (6')   Wt 102 kg (225 lb)   SpO2 97%   BMI 30.52 kg/m²     GEN: No apparent distress  HEENT: Head normocephalic, atraumatic.  Sclera nonicteric bilaterally, no ocular discharge appreciated bilaterally.  CV: Extremities warm and well-perfused, edematous extremities.  PULMONARY: Breathing nonlabored on room air, no respiratory accessory muscle use.  Not requiring supplemental oxygen.  ABD: Soft, nontender.  SKIN: No appreciable skin breakdown on exposed areas of skin.  PSYCH: Mood and affect within normal limits.  NEURO: Awake alert.  Conversational.  Logical thought content.  MSK: Clinical picture from 2/12      Laboratory Values:  Recent Results (from the past 72 hour(s))   POCT glucose device results    Collection Time: 02/11/24 11:31 AM   Result Value Ref Range    POC Glucose, Blood 177 (H) 65 - 99 mg/dL   POCT glucose device results    Collection Time: 02/11/24  5:29 PM   Result Value Ref Range    POC Glucose, Blood 221 (H) 65 - 99 mg/dL   POCT glucose device results    Collection Time: 02/11/24  9:10 PM   Result Value Ref Range    POC Glucose, Blood 254 (H) 65 - 99 mg/dL   CBC WITHOUT DIFFERENTIAL    Collection Time: 02/12/24  5:43 AM   Result Value Ref Range    WBC 6.0 4.8 - 10.8 K/uL    RBC 3.06 (L) 4.70 - 6.10 M/uL    Hemoglobin 8.8 (L) 14.0 - 18.0 g/dL     Hematocrit 26.8 (L) 42.0 - 52.0 %    MCV 87.6 81.4 - 97.8 fL    MCH 28.8 27.0 - 33.0 pg    MCHC 32.8 32.3 - 36.5 g/dL    RDW 45.1 35.9 - 50.0 fL    Platelet Count 196 164 - 446 K/uL    MPV 9.7 9.0 - 12.9 fL   Basic Metabolic Panel    Collection Time: 02/12/24  5:43 AM   Result Value Ref Range    Sodium 133 (L) 135 - 145 mmol/L    Potassium 4.6 3.6 - 5.5 mmol/L    Chloride 103 96 - 112 mmol/L    Co2 21 20 - 33 mmol/L    Glucose 89 65 - 99 mg/dL    Bun 39 (H) 8 - 22 mg/dL    Creatinine 2.83 (H) 0.50 - 1.40 mg/dL    Calcium 7.0 (L) 8.5 - 10.5 mg/dL    Anion Gap 9.0 7.0 - 16.0   ESTIMATED GFR    Collection Time: 02/12/24  5:43 AM   Result Value Ref Range    GFR (CKD-EPI) 25 (A) >60 mL/min/1.73 m 2   POCT glucose device results    Collection Time: 02/12/24  8:07 AM   Result Value Ref Range    POC Glucose, Blood 100 (H) 65 - 99 mg/dL   POCT glucose device results    Collection Time: 02/12/24 10:42 AM   Result Value Ref Range    POC Glucose, Blood 125 (H) 65 - 99 mg/dL   POCT glucose device results    Collection Time: 02/12/24  4:59 PM   Result Value Ref Range    POC Glucose, Blood 151 (H) 65 - 99 mg/dL   POCT glucose device results    Collection Time: 02/12/24  8:59 PM   Result Value Ref Range    POC Glucose, Blood 206 (H) 65 - 99 mg/dL   POCT glucose device results    Collection Time: 02/13/24  7:56 AM   Result Value Ref Range    POC Glucose, Blood 179 (H) 65 - 99 mg/dL   POCT glucose device results    Collection Time: 02/13/24 11:15 AM   Result Value Ref Range    POC Glucose, Blood 150 (H) 65 - 99 mg/dL   Basic Metabolic Panel    Collection Time: 02/13/24  1:00 PM   Result Value Ref Range    Sodium 130 (L) 135 - 145 mmol/L    Potassium 4.7 3.6 - 5.5 mmol/L    Chloride 101 96 - 112 mmol/L    Co2 20 20 - 33 mmol/L    Glucose 160 (H) 65 - 99 mg/dL    Bun 50 (H) 8 - 22 mg/dL    Creatinine 2.70 (H) 0.50 - 1.40 mg/dL    Calcium 7.3 (L) 8.5 - 10.5 mg/dL    Anion Gap 9.0 7.0 - 16.0   proBrain Natriuretic Peptide, NT     Collection Time: 02/13/24  1:00 PM   Result Value Ref Range    NT-proBNP 3138 (H) 0 - 125 pg/mL   Renal Function Panel    Collection Time: 02/13/24  1:00 PM   Result Value Ref Range    Correct Calcium 8.8 8.5 - 10.5 mg/dL    Phosphorus 2.4 (L) 2.5 - 4.5 mg/dL    Albumin 2.1 (L) 3.2 - 4.9 g/dL   ESTIMATED GFR    Collection Time: 02/13/24  1:00 PM   Result Value Ref Range    GFR (CKD-EPI) 27 (A) >60 mL/min/1.73 m 2   POCT glucose device results    Collection Time: 02/13/24  5:06 PM   Result Value Ref Range    POC Glucose, Blood 189 (H) 65 - 99 mg/dL   POCT glucose device results    Collection Time: 02/13/24  8:47 PM   Result Value Ref Range    POC Glucose, Blood 195 (H) 65 - 99 mg/dL   POCT glucose device results    Collection Time: 02/14/24  7:38 AM   Result Value Ref Range    POC Glucose, Blood 105 (H) 65 - 99 mg/dL       Medications:  Scheduled Medications   Medication Dose Frequency    furosemide  80 mg BID DIURETIC    amLODIPine  5 mg Q DAY    cholestyramine  1 Packet BID    therapeutic multivitamin-minerals  1 Tablet DAILY WITH LUNCH    zinc sulfate  220 mg DAILY WITH LUNCH    ascorbic acid  500 mg BID WITH MEALS    aspirin  81 mg DAILY    senna-docusate  2 Tablet BID    atorvastatin  80 mg Q EVENING    calcium carbonate  500 mg BID WITH MEALS    epoetin  5,000 Units MO, WE + FR    heparin  5,000 Units Q8HRS    insulin GLARGINE  15 Units Q EVENING    insulin regular  3-14 Units 4X/DAY ACHS    omeprazole  20 mg QAM    Pharmacy Consult Request  1 Each PHARMACY TO DOSE     PRN medications: senna-docusate **AND** polyethylene glycol/lytes **AND** bisacodyl, Respiratory Therapy Consult, hydrALAZINE, carboxymethylcellulose, benzocaine-menthol, mag hydrox-al hydrox-simeth, ondansetron **OR** ondansetron, traZODone, sodium chloride, insulin regular **AND** POC blood glucose manual result **AND** NOTIFY MD and PharmD **AND** Administer 20 grams of glucose (approximately 8 ounces of fruit juice) every 15 minutes PRN FSBG  less than 70 mg/dL **AND** dextrose bolus, acetaminophen, heparin, lidocaine PF, oxyCODONE immediate-release **OR** oxyCODONE immediate-release    Diet:  Current Diet Order   Procedures    Diet Order Diet: Consistent CHO (Diabetic); Second Modifier: (optional): Renal       Medical Decision Making and Plan:  Left BKA secondary to acute on chronic osteomyelitis in setting of diabetes mellitus  Osteomyelitis resolved with infection control with BKA and s/p IV antibiotics  PT and OT for mobility and ADLs. Per guidelines, 15 hours per week between PT, OT and/or SLP.  Follow-up orthopedics    2/9 erythematous residual limb on admission.  WBC is reassuring and is normal. Despite erythema, not with any excess warmth compared to rest of residual limb. No purulent. No clinical concern for infection at this time. Possible due to gravity dependency when up with therapy and pressure from wrapping. Continue to monitor closely.     2/13 Clinicaly picture 2/12 with good shape an non-concerning exam.      Acute blood loss anemia  Postoperative and CKD, required 1 unit PRBC  Hemoglobin 2/9 is improved at 9.4 --> 8.8 2/12.  Nephrology following     Acute renal failure stage IIIb CKD on HD  S/p right IJ tunneled cath placement 1/29/2024  Hemodialysis Monday, Wednesday, Friday  Fluid positive  2/9 place compression stocking right lower extremity  2/9 discussed with nephrology regarding use of Lasix, initiated.   2/12 HD on HOLD. Hoping for renal recovery.   Nephrology following     Type 2 diabetes mellitus with hyperglycemia, insulin-dependent  Long-term complications causing CKD, peripheral neuropathy, neurogenic bowel  Continue long-acting and short acting insulin  At home had been on Lantus 36 at night with SSI     Hypocalcemia  Continue supplementing 2/12    Hyperphosphatemia  Resolved on admission 2.7    Hyponatremia  Patient has specific diet for his GI system  On dialysis  Nephrology following  Stable low 133 2/12    Elevated  TSH  Normal free T4  Continue to monitor     History of CAD s/p ARTURO 9/22/2019  Last cardiology note 7/7/2023: On atorvastatin 80 mg, aspirin 81 mg, carvedilol 3.125 mg twice daily, Jardiance 25 mg daily  Continue aspirin 81 mg daily and statin 80 mg nightly     Hypertension  Last cardiology note 7/7/2023: Previously had been on carvedilol 3.125 mg twice daily and enalapril 10 mg twice daily  2/9 blood pressures running in the 130s-150s. Hold on BP medication initiation, potential diuresis.   2/12 blood pressure elevated.  He is on Lasix 60 mg twice daily. D/w nephrology start amlodipine 5mg daily. Hold on BB due to HR, hold on ACE-I due to hopefully return of renal function. Will need to start reducing Lasix.   2/13 BP better controlled. Increase Lasix to 80mg daily - cleared with nephrology.   2/14 still with intermittent readings in the 150s, 160s     Pain  Currently not reporting pain  As needed Tylenol  As needed oxycodone     Skin  Patient at risk for skin breakdown due to debility in areas including sacrum, achilles, elbows and head in addition to other sites. Nursing to assess skin daily.      GI Ppx  Patient on Prilosec for GERD prophylaxis.      Bowel   Neurogenic bowel secondary to long-term complications from diabetes  Colestyramine at 10 AM and 5 PM  Follows with GI  Patient on Senna-docusate for constipation prophylaxis.   Abnormal frequency of bowel movements at baseline.  Can go 5 to 7 days.     Bladder  TV/PVR/BS PRN   PVRs in 200's, likely positional. Monitor    Left Humeral Fracture 12/2023 s/p ORIF 12/11/23 Dr. Aris Pierre  D/w Dr. Pierre 2/14/2024 and is WBAT with ROM as tolerated L shoulder/arm     Upcoming Labs/imaging: BMP + CBC 2/14/2024      DVT PROPHYLAXIS: Heparin 5000 units q8 hrs     HOSPITALIST FOLLOWING: No. Nephrology following - d/w nephrology 2/13     CODE STATUS: FULL CODE     DISPO: Home with supportive parents.      LISE: 2/22/24     ADDITIONAL MEDICAL NEEDS ON D/C (IV abx,  O2, etc): TBD     MEDS SENT TO: TBD     DISCHARGE SPECIALIST FOLLOW UP: Orthopedics ([facility] - [facility])     Patient to scheduled follow up with their PCP within 2 weeks from discharge from the [facility].      ____________________________________    Dr. Mena Ny DO, MS  [facility] - Physical Medicine & Rehabilitation   ____________________________________    _____________________________________  Interdisciplinary Team Conference   Most recent IDT on 2/14/2024    I, Dr. Mena Ny DO, MS, was present and led the interdisciplinary team conference on 2/14/2024.  I led the IDT conference and agree with the IDT conference documentation and plan of care as noted below.     Nursing:  Diet Current Diet Order   Procedures    Diet Order Diet: Consistent CHO (Diabetic); Second Modifier: (optional): Renal       Eating ADL Supervision  Increased time, Set-up of equipment or meal/tube feeding, Supervision for safety   % of Last Meal  Oral Nutrition: Breakfast, Between % Consumed   Sleep    Bowel Last BM:  (Refused bowel meds despite education.)   Bladder    BP Control     Physical Therapy:  Bed Mobility    Transfers Standby Assist  Adaptive equipment, Increased time, Slideboard transfer from bed to wheelchair, Supervision for safety   Mobility Unable to Participate   Stairs    Approaching modified independent     Occupational Therapy:  Grooming Standby Assist, Seated   Bathing Supervision   UB Dressing Supervision   LB Dressing Moderate Assist   Toileting Moderate Assist   Shower & Transfer        Respiratory Therapy:  O2 (LPM): 0  O2 Delivery Device: None - Room Air    Case Management:  Continues to work on disposition and DME needs.     BARRIERS TO DISCHARGE HOME:  Step to enter  WB restrictions for shoulder  Equipment     Discharge Date/Disposition:  2/22/24  _____________________________________    Total time:  54 minutes. Time spent included pre-rounding review of vitals and tests,  unit/floor time, face-to-face time with the patient including physical examination, care coordination, counseling of patient and/or family, ordering medications/procedures/tests, discussion with CM, PT, OT, SLP and/or other healthcare providers, and documentation in the electronic medical record.

## 2024-02-14 NOTE — PROGRESS NOTES
NURSING DAILY NOTE    Name: Beni Moore   Date of Admission: 2/8/2024   Admitting Diagnosis: Hx of BKA, left (HCC)  Attending Physician: Mena Ny D.o.  Allergies: Patient has no known allergies.    Safety  Patient Assist  mod assist  Patient Precautions  Fall Risk, Non Weight Bearing Left Lower Extremity, Immobilizer Left Lower Extremity  Precaution Comments  L BKA; L proximal humerus fx with ORIF (sx 12/06)  Bed Transfer Status  Standby Assist  Toilet Transfer Status   Minimal Assist  Assistive Devices  Rails, Slide board, Wheelchair  Oxygen  None - Room Air  Diet/Therapeutic Dining  Current Diet Order   Procedures    Diet Order Diet: Consistent CHO (Diabetic); Second Modifier: (optional): Renal     Pill Administration  whole  Agitated Behavioral Scale     ABS Level of Severity       Fall Risk  Has the patient had a fall this admission?   No  Florecita Roldan Fall Risk Scoring  22, HIGH RISK  Fall Risk Safety Measures  bed alarm, chair alarm, and poor balance    Vitals  Temperature: 36.8 °C (98.3 °F)  Temp src: Oral  Pulse: 74  Respiration: 18  Blood Pressure: 129/72  Blood Pressure MAP (Calculated): 91 MM HG  BP Location: Left, Upper Arm  Patient BP Position: Supine     Oxygen  Pulse Oximetry: 97 %  O2 (LPM): 0  O2 Delivery Device: None - Room Air    Bowel and Bladder  Last Bowel Movement   (Refused bowel meds despite education.)  Stool Type     Bowel Device     Continent  Bladder: Continent void   Bowel: Continent movement  Bladder Function  Urine Void (mL): 300 ml (urinals)  Number of Times Voided: 1  Urine Color: Yellow  Genitourinary Assessment   Bladder Assessment (WDL):  WDL Except  Farrell Catheter: Not Applicable  Urinary Elimination:  (able to void)  Urine Color: Yellow  Time Void: Yes  Bladder Scan: Post Void  $ Bladder Scan Results (mL): 78    Skin  Johnny Score   18  Sensory Interventions   Bed Types: Standard/Trauma Mattress with  Overlay  Skin Preventative Measures: Pillows in Use for Support / Positioning  Moisture Interventions  Moisturizers/Barriers: Barrier Wipes      Pain  Pain Rating Scale  0 - No Pain  Pain Location  Shoulder  Pain Location Orientation  Left  Pain Interventions   Declines    ADLs    Bathing   Shower, * * With Assistance from, Staff  Linen Change      Personal Hygiene     Chlorhexidine Bath      Oral Care     Teeth/Dentures     Shave     Nutrition Percentage Eaten  Dinner, Between % Consumed  Environmental Precautions  Bed in Low Position, Treaded Slipper Socks on Patient  Patient Turns/Positioning  Patient Turns Self from Side to Side  Patient Turns Assistance/Tolerance  Assistance of One  Bed Positions  Bed Controls On  Head of Bed Elevated  Self regulated      Psychosocial/Neurologic Assessment  Psychosocial Assessment  Psychosocial (WDL):  Within Defined Limits  Neurologic Assessment  Neuro (WDL): Exceptions to WDL  Level of Consciousness: Alert  Orientation Level: Oriented X4  Cognition: Follows commands, Appropriate attention/concentration  Speech: Clear  EENT (WDL):  WDL Except    Cardio/Pulmonary Assessment  Edema   LLE Edema: 1+  Respiratory Breath Sounds  RUL Breath Sounds: Clear  RML Breath Sounds: Clear  RLL Breath Sounds: Clear  CHRIS Breath Sounds: Clear  LLL Breath Sounds: Clear  Cardiac Assessment   Cardiac (WDL):  WDL Except (HTN, DM, Circulatory problems)

## 2024-02-15 ENCOUNTER — APPOINTMENT (OUTPATIENT)
Dept: PHYSICAL THERAPY | Facility: REHABILITATION | Age: 54
DRG: 559 | End: 2024-02-15
Attending: PHYSICAL MEDICINE & REHABILITATION
Payer: MEDICARE

## 2024-02-15 ENCOUNTER — APPOINTMENT (OUTPATIENT)
Dept: OCCUPATIONAL THERAPY | Facility: REHABILITATION | Age: 54
DRG: 559 | End: 2024-02-15
Attending: PHYSICAL MEDICINE & REHABILITATION
Payer: MEDICARE

## 2024-02-15 LAB
ANION GAP SERPL CALC-SCNC: 11 MMOL/L (ref 7–16)
BUN SERPL-MCNC: 49 MG/DL (ref 8–22)
CALCIUM SERPL-MCNC: 7.8 MG/DL (ref 8.5–10.5)
CHLORIDE SERPL-SCNC: 103 MMOL/L (ref 96–112)
CO2 SERPL-SCNC: 19 MMOL/L (ref 20–33)
CREAT SERPL-MCNC: 2.86 MG/DL (ref 0.5–1.4)
GFR SERPLBLD CREATININE-BSD FMLA CKD-EPI: 25 ML/MIN/1.73 M 2
GLUCOSE BLD STRIP.AUTO-MCNC: 106 MG/DL (ref 65–99)
GLUCOSE BLD STRIP.AUTO-MCNC: 129 MG/DL (ref 65–99)
GLUCOSE BLD STRIP.AUTO-MCNC: 168 MG/DL (ref 65–99)
GLUCOSE SERPL-MCNC: 141 MG/DL (ref 65–99)
POTASSIUM SERPL-SCNC: 4.7 MMOL/L (ref 3.6–5.5)
SODIUM SERPL-SCNC: 133 MMOL/L (ref 135–145)

## 2024-02-15 PROCEDURE — 700111 HCHG RX REV CODE 636 W/ 250 OVERRIDE (IP): Mod: JZ | Performed by: INTERNAL MEDICINE

## 2024-02-15 PROCEDURE — 97530 THERAPEUTIC ACTIVITIES: CPT

## 2024-02-15 PROCEDURE — 97110 THERAPEUTIC EXERCISES: CPT

## 2024-02-15 PROCEDURE — 99232 SBSQ HOSP IP/OBS MODERATE 35: CPT | Performed by: INTERNAL MEDICINE

## 2024-02-15 PROCEDURE — P9047 ALBUMIN (HUMAN), 25%, 50ML: HCPCS | Mod: JZ | Performed by: INTERNAL MEDICINE

## 2024-02-15 PROCEDURE — A9270 NON-COVERED ITEM OR SERVICE: HCPCS | Performed by: PHYSICAL MEDICINE & REHABILITATION

## 2024-02-15 PROCEDURE — 700102 HCHG RX REV CODE 250 W/ 637 OVERRIDE(OP): Performed by: PHYSICAL MEDICINE & REHABILITATION

## 2024-02-15 PROCEDURE — 770010 HCHG ROOM/CARE - REHAB SEMI PRIVAT*

## 2024-02-15 PROCEDURE — 97535 SELF CARE MNGMENT TRAINING: CPT

## 2024-02-15 PROCEDURE — 700111 HCHG RX REV CODE 636 W/ 250 OVERRIDE (IP): Performed by: PHYSICAL MEDICINE & REHABILITATION

## 2024-02-15 PROCEDURE — 80048 BASIC METABOLIC PNL TOTAL CA: CPT

## 2024-02-15 PROCEDURE — 99231 SBSQ HOSP IP/OBS SF/LOW 25: CPT | Performed by: PHYSICAL MEDICINE & REHABILITATION

## 2024-02-15 PROCEDURE — 82962 GLUCOSE BLOOD TEST: CPT | Mod: 91

## 2024-02-15 RX ADMIN — ALBUMIN (HUMAN) 12.5 G: 12.5 SOLUTION INTRAVENOUS at 15:21

## 2024-02-15 RX ADMIN — INSULIN HUMAN 3 UNITS: 100 INJECTION, SOLUTION PARENTERAL at 20:16

## 2024-02-15 RX ADMIN — ALBUMIN (HUMAN) 12.5 G: 12.5 SOLUTION INTRAVENOUS at 05:28

## 2024-02-15 RX ADMIN — ATORVASTATIN CALCIUM 80 MG: 40 TABLET, FILM COATED ORAL at 20:12

## 2024-02-15 RX ADMIN — TRAZODONE HYDROCHLORIDE 50 MG: 50 TABLET ORAL at 20:20

## 2024-02-15 RX ADMIN — FUROSEMIDE 80 MG: 10 INJECTION INTRAMUSCULAR; INTRAVENOUS at 16:25

## 2024-02-15 RX ADMIN — HEPARIN SODIUM 3600 UNITS: 1000 INJECTION, SOLUTION INTRAVENOUS; SUBCUTANEOUS at 14:11

## 2024-02-15 RX ADMIN — INSULIN GLARGINE-YFGN 15 UNITS: 100 INJECTION, SOLUTION SUBCUTANEOUS at 20:18

## 2024-02-15 RX ADMIN — OMEPRAZOLE 20 MG: 20 CAPSULE, DELAYED RELEASE ORAL at 09:36

## 2024-02-15 RX ADMIN — INSULIN HUMAN 3 UNITS: 100 INJECTION, SOLUTION PARENTERAL at 17:31

## 2024-02-15 RX ADMIN — HEPARIN SODIUM 5000 UNITS: 5000 INJECTION, SOLUTION INTRAVENOUS; SUBCUTANEOUS at 20:12

## 2024-02-15 RX ADMIN — FUROSEMIDE 80 MG: 10 INJECTION INTRAMUSCULAR; INTRAVENOUS at 06:12

## 2024-02-15 RX ADMIN — ASPIRIN 81 MG: 81 TABLET, COATED ORAL at 05:18

## 2024-02-15 RX ADMIN — OXYCODONE HYDROCHLORIDE AND ACETAMINOPHEN 500 MG: 500 TABLET ORAL at 05:18

## 2024-02-15 RX ADMIN — OXYCODONE HYDROCHLORIDE AND ACETAMINOPHEN 500 MG: 500 TABLET ORAL at 17:27

## 2024-02-15 RX ADMIN — CALCIUM 500 MG: 500 TABLET ORAL at 17:27

## 2024-02-15 RX ADMIN — HEPARIN SODIUM 5000 UNITS: 5000 INJECTION, SOLUTION INTRAVENOUS; SUBCUTANEOUS at 14:47

## 2024-02-15 RX ADMIN — Medication 220 MG: at 14:47

## 2024-02-15 RX ADMIN — Medication 1 TABLET: at 14:47

## 2024-02-15 RX ADMIN — AMLODIPINE BESYLATE 5 MG: 5 TABLET ORAL at 05:18

## 2024-02-15 RX ADMIN — CALCIUM 500 MG: 500 TABLET ORAL at 09:36

## 2024-02-15 RX ADMIN — CHOLESTYRAMINE 4 G: 4 POWDER, FOR SUSPENSION ORAL at 16:25

## 2024-02-15 RX ADMIN — HEPARIN SODIUM 5000 UNITS: 5000 INJECTION, SOLUTION INTRAVENOUS; SUBCUTANEOUS at 05:19

## 2024-02-15 RX ADMIN — CHOLESTYRAMINE 4 G: 4 POWDER, FOR SUSPENSION ORAL at 09:58

## 2024-02-15 ASSESSMENT — ENCOUNTER SYMPTOMS
SHORTNESS OF BREATH: 0
ABDOMINAL PAIN: 0
ORTHOPNEA: 0
NAUSEA: 0
WEIGHT LOSS: 0
FLANK PAIN: 0
COUGH: 0
EYES NEGATIVE: 1
CHILLS: 0
WHEEZING: 0
FEVER: 0
SINUS PAIN: 0
PALPITATIONS: 0
HEMOPTYSIS: 0
VOMITING: 0

## 2024-02-15 ASSESSMENT — ACTIVITIES OF DAILY LIVING (ADL)
BED_CHAIR_WHEELCHAIR_TRANSFER_DESCRIPTION: INCREASED TIME;SET-UP OF EQUIPMENT;SUPERVISION FOR SAFETY;VERBAL CUEING
BED_CHAIR_WHEELCHAIR_TRANSFER_DESCRIPTION: ADAPTIVE EQUIPMENT;INCREASED TIME;SUPERVISION FOR SAFETY
BED_CHAIR_WHEELCHAIR_TRANSFER_DESCRIPTION: ADAPTIVE EQUIPMENT;INCREASED TIME;SET-UP OF EQUIPMENT
TOILET_TRANSFER_DESCRIPTION: ADAPTIVE EQUIPMENT;VERBAL CUEING;SUPERVISION FOR SAFETY

## 2024-02-15 ASSESSMENT — PAIN DESCRIPTION - PAIN TYPE: TYPE: ACUTE PAIN

## 2024-02-15 ASSESSMENT — GAIT ASSESSMENTS: GAIT LEVEL OF ASSIST: UNABLE TO PARTICIPATE

## 2024-02-15 NOTE — THERAPY
Physical Therapy   Daily Treatment     Patient Name: Beni Moore  Age:  53 y.o., Sex:  male  Medical Record #: [id number]  Today's Date: 2/15/2024     Precautions  Precautions: Fall Risk, Non Weight Bearing Left Lower Extremity, Immobilizer Left Lower Extremity  Comments: L BKA, WBAT on L UE s/p ORIF    Subjective    Pt up in wc, ready for PT     Objective       02/15/24 1001   PT Charge Group   PT Therapeutic Exercise (Units) 2   PT Total Time Spent   PT Individual Total Time Spent (Mins) 30   Transfer Functional Level of Assist   Bed, Chair, Wheelchair Transfer Standby Assist   Bed Chair Wheelchair Transfer Description Adaptive equipment;Increased time;Set-up of equipment   Supine Lower Body Exercise   Bridges 3 sets of 10  (LLE on pillows)   Hip Abduction Side Lying;3 sets of 10;Left   Hip Adduction  3 sets of 10  (ADRIANA squeeze)   Straight Leg Raises 3 sets of 10;Left   Gluteal Isometrics 3 sets of 10   Quadriceps Isometrics 3 sets of 10;Left   Other Exercises LUE shoulder flex / scapular abd/add 3 x 10         Assessment    Pt completes LE exercises well, verbal cues only, pt tolerated AAROM for LUE as noted with improved shoulder flexion ~ 100* in supine.   Strengths: Able to follow instructions, Alert and oriented, Effective communication skills, Good carryover of learning, Good insight into deficits/needs, Independent prior level of function, Motivated for self care and independence, Pleasant and cooperative, Supportive family, Willingly participates in therapeutic activities  Barriers: Fatigue, Generalized weakness, Impaired activity tolerance, Impaired balance, Pain    Plan      Continue with mat program, emphasize prone positioning for stretching, AAROM/ PROM and stretching to L shoulder, transfer training from various heights, car transfer, WC mobility outdoors, standing with FWW. Pt is cleared for WBAT LUE     DME  PT DME Recommendations  Wheelchair:  (Pt already has his own WC and  slideboard)  Assistive Device: Front Wheeled Walker    Passport items to be completed:  Get in/out of bed safely, in/out of a vehicle, safely use mobility device, walk or wheel around home/community, navigate up and down stairs, show how to get up/down from the ground, ensure home is accessible, demonstrate HEP, complete caregiver training    Physical Therapy Problems (Active)       Problem: Balance       Dates: Start:  02/09/24         Goal: STG-Within one week, patient will maintain static standing in parallel bars Gina with single UE support for 10 seconds in order to facilitate completion of standing ADLs       Dates: Start:  02/09/24               Problem: Mobility       Dates: Start:  02/09/24         Goal: STG-Within one week, patient will propel wheelchair community distances of at least 150' independently       Dates: Start:  02/09/24               Problem: Mobility Transfers       Dates: Start:  02/09/24         Goal: STG-Within one week, patient will sit to stand from WC Gina with UE pushing from WC        Dates: Start:  02/09/24               Problem: PT-Long Term Goals       Dates: Start:  02/09/24         Goal: LTG-By discharge, patient will tolerate standing with intermittent UE support for at least 1 minute CGA in order to facilitate standing ADLs        Dates: Start:  02/09/24            Goal: LTG-By discharge, patient will perform squat pivot transfer from one surface to another SPV       Dates: Start:  02/09/24            Goal: LTG-By discharge, patient will perform home exercise program independently        Dates: Start:  02/09/24            Goal: LTG-By discharge, patient will transfer in/out of a car using LRAD or no AD CGA       Dates: Start:  02/09/24

## 2024-02-15 NOTE — PROGRESS NOTES
NURSING DAILY NOTE    Name: Beni Moore   Date of Admission: 2/8/2024   Admitting Diagnosis: Hx of BKA, left (HCC)  Attending Physician: Mena Ny D.o.  Allergies: Patient has no known allergies.    Safety  Patient Assist  mod a  Patient Precautions  Fall Risk, Non Weight Bearing Left Lower Extremity, Immobilizer Left Lower Extremity  Precaution Comments  L BKA, WBAT on L UE s/p ORIF  Bed Transfer Status  Supervised  Toilet Transfer Status   Minimal Assist (CGA-min A lateral scoot txfr w/c <> drop arm commode)  Assistive Devices  Wheelchair  Oxygen  None - Room Air  Diet/Therapeutic Dining  Current Diet Order   Procedures    Diet Order Diet: Consistent CHO (Diabetic); Second Modifier: (optional): Renal     Pill Administration  whole  Agitated Behavioral Scale     ABS Level of Severity       Fall Risk  Has the patient had a fall this admission?   No  Florecita Roldan Fall Risk Scoring  22, HIGH RISK  Fall Risk Safety Measures  bed alarm, chair alarm, and poor balance    Vitals  Temperature: 36.5 °C (97.7 °F)  Temp src: Oral  Pulse: 79  Respiration: 16  Blood Pressure: (!) 148/75 (rn notified)  Blood Pressure MAP (Calculated): 99 MM HG  BP Location: Left, Upper Arm  Patient BP Position: Sitting     Oxygen  Pulse Oximetry: 99 %  O2 (LPM): 0  O2 Delivery Device: None - Room Air    Bowel and Bladder  Last Bowel Movement  02/14/24  Stool Type  Type 4: Like a sausage or snake, smooth and soft  Bowel Device     Continent  Bladder: Continent void   Bowel: Continent movement  Bladder Function  Urine Void (mL): 500 ml  Number of Times Voided: 1  Urine Color: Yellow  Genitourinary Assessment   Bladder Assessment (WDL):  WDL Except  Farrell Catheter: Not Applicable  Urinary Elimination:  (able to void)  Urine Color: Yellow  Time Void: Yes  Bladder Scan: Post Void  $ Bladder Scan Results (mL): 78    Skin  Johnny Score   18  Sensory Interventions   Bed Types:  Standard/Trauma Mattress  Skin Preventative Measures: Pillows in Use for Support / Positioning  Moisture Interventions  Moisturizers/Barriers: Barrier Wipes      Pain  Pain Rating Scale  0 - No Pain  Pain Location  Shoulder  Pain Location Orientation  Left  Pain Interventions   Declines    ADLs    Bathing   Shower, * * With Assistance from, Staff  Linen Change      Personal Hygiene     Chlorhexidine Bath      Oral Care     Teeth/Dentures     Shave     Nutrition Percentage Eaten  *  * Meal *  *, Dinner, Between % Consumed  Environmental Precautions  Bed in Low Position, Treaded Slipper Socks on Patient  Patient Turns/Positioning  Patient Turns Self from Side to Side  Patient Turns Assistance/Tolerance  Assistance of One  Bed Positions  Bed Controls On  Head of Bed Elevated  Self regulated      Psychosocial/Neurologic Assessment  Psychosocial Assessment  Psychosocial (WDL):  Within Defined Limits  Neurologic Assessment  Neuro (WDL): Exceptions to WDL  Level of Consciousness: Alert  Orientation Level: Oriented X4  Cognition: Follows commands, Appropriate attention/concentration  Speech: Clear  EENT (WDL):  WDL Except    Cardio/Pulmonary Assessment  Edema   LLE Edema: 1+  Respiratory Breath Sounds  RUL Breath Sounds: Clear  RML Breath Sounds: Clear  RLL Breath Sounds: Clear  CHRIS Breath Sounds: Clear  LLL Breath Sounds: Clear  Cardiac Assessment   Cardiac (WDL):  WDL Except (Hx HTN)

## 2024-02-15 NOTE — DISCHARGE PLANNING
Cm   Referral sent to [facility] for Rn/PT/OT. Informed Prisilla of dc address:     Pt staying at parent's home:(address: [address], [place] [address] )

## 2024-02-15 NOTE — PROGRESS NOTES
[facility] Progress Note       CVC checked for patency today. Both arterial and venous ports draw without resistance. Flushed both ports with saline and locked with Heparin 1:1000 units; 1.8 mL to RED port and 1.8 mL to BLUE port. No s/s of infection present. Dressing changed today, CDI. Report given to JULIANO Schofield.

## 2024-02-15 NOTE — CARE PLAN
The patient is Watcher - Medium risk of patient condition declining or worsening    Shift Goals  Clinical Goals: Safety, DM mgmt, infection prevention, sleep  Patient Goals: go home    Progress made toward(s) clinical / shift goals:   Problem: Infection  Goal: Patient will remain free from infection  Outcome: Progressing  Note: Pt is able to verbalize s/s of infection: redness of area, fever, pain. POC ongoing, no further concerns as of present. Pt expresses no other needs at this time. Call light within reach

## 2024-02-15 NOTE — DISCHARGE PLANNING
Case Management/IDT follow up.   IDT continues to recommend IRF level of care as patient continue to make progress with all therapies.   Projected dc date set for 2/22 Thursday.  Pt has all dme; pt prefers home health; he is hoping he will not need dialysis @ time of dc.     Plan:  Continue to follow

## 2024-02-15 NOTE — PROGRESS NOTES
Physical Medicine & Rehabilitation Progress Note    Encounter Date: 2/15/2024    Chief Complaint: Happy that his kidneys are doing okay    Interval Events (Subjective):  Vital signs, improving with systolic max 148  Large bowel movement 2/15  Voiding volitionally    Patient seen and examined in the café.  He is doing okay.  Would like me to look at his residual limb.  Happy that his kidney function is staying stable.  No other additional concerns at this time.      ROS: 14 point ROS negative unless otherwise specified in the HPI    Objective:  VITAL SIGNS: /73   Pulse 79   Temp 36.4 °C (97.6 °F) (Oral)   Resp 18   Ht 1.829 m (6')   Wt 102 kg (225 lb)   SpO2 98%   BMI 30.52 kg/m²     GEN: No apparent distress  HEENT: Head normocephalic, atraumatic.  Sclera nonicteric bilaterally, no ocular discharge appreciated bilaterally.  CV: Extremities warm and well-perfused, edematous extremities.  PULMONARY: Breathing nonlabored on room air, no respiratory accessory muscle use.  Not requiring supplemental oxygen.  ABD: Soft, nontender.  SKIN: No appreciable skin breakdown on exposed areas of skin.  PSYCH: Mood and affect within normal limits.  NEURO: Awake alert.  Conversational.  Logical thought content.  MSK: Clinical picture from 2/12      Clinical picture 2/15      Laboratory Values:  Recent Results (from the past 72 hour(s))   POCT glucose device results    Collection Time: 02/12/24 10:42 AM   Result Value Ref Range    POC Glucose, Blood 125 (H) 65 - 99 mg/dL   POCT glucose device results    Collection Time: 02/12/24  4:59 PM   Result Value Ref Range    POC Glucose, Blood 151 (H) 65 - 99 mg/dL   POCT glucose device results    Collection Time: 02/12/24  8:59 PM   Result Value Ref Range    POC Glucose, Blood 206 (H) 65 - 99 mg/dL   POCT glucose device results    Collection Time: 02/13/24  7:56 AM   Result Value Ref Range    POC Glucose, Blood 179 (H) 65 - 99 mg/dL   POCT glucose device results    Collection  Time: 02/13/24 11:15 AM   Result Value Ref Range    POC Glucose, Blood 150 (H) 65 - 99 mg/dL   Basic Metabolic Panel    Collection Time: 02/13/24  1:00 PM   Result Value Ref Range    Sodium 130 (L) 135 - 145 mmol/L    Potassium 4.7 3.6 - 5.5 mmol/L    Chloride 101 96 - 112 mmol/L    Co2 20 20 - 33 mmol/L    Glucose 160 (H) 65 - 99 mg/dL    Bun 50 (H) 8 - 22 mg/dL    Creatinine 2.70 (H) 0.50 - 1.40 mg/dL    Calcium 7.3 (L) 8.5 - 10.5 mg/dL    Anion Gap 9.0 7.0 - 16.0   proBrain Natriuretic Peptide, NT    Collection Time: 02/13/24  1:00 PM   Result Value Ref Range    NT-proBNP 3138 (H) 0 - 125 pg/mL   Renal Function Panel    Collection Time: 02/13/24  1:00 PM   Result Value Ref Range    Correct Calcium 8.8 8.5 - 10.5 mg/dL    Phosphorus 2.4 (L) 2.5 - 4.5 mg/dL    Albumin 2.1 (L) 3.2 - 4.9 g/dL   ESTIMATED GFR    Collection Time: 02/13/24  1:00 PM   Result Value Ref Range    GFR (CKD-EPI) 27 (A) >60 mL/min/1.73 m 2   POCT glucose device results    Collection Time: 02/13/24  5:06 PM   Result Value Ref Range    POC Glucose, Blood 189 (H) 65 - 99 mg/dL   POCT glucose device results    Collection Time: 02/13/24  8:47 PM   Result Value Ref Range    POC Glucose, Blood 195 (H) 65 - 99 mg/dL   POCT glucose device results    Collection Time: 02/14/24  7:38 AM   Result Value Ref Range    POC Glucose, Blood 105 (H) 65 - 99 mg/dL   POCT glucose device results    Collection Time: 02/14/24 11:35 AM   Result Value Ref Range    POC Glucose, Blood 141 (H) 65 - 99 mg/dL   Basic Metabolic Panel    Collection Time: 02/14/24 12:45 PM   Result Value Ref Range    Sodium 130 (L) 135 - 145 mmol/L    Potassium 4.8 3.6 - 5.5 mmol/L    Chloride 102 96 - 112 mmol/L    Co2 18 (L) 20 - 33 mmol/L    Glucose 139 (H) 65 - 99 mg/dL    Bun 54 (H) 8 - 22 mg/dL    Creatinine 2.94 (H) 0.50 - 1.40 mg/dL    Calcium 7.6 (L) 8.5 - 10.5 mg/dL    Anion Gap 10.0 7.0 - 16.0   CBC WITHOUT DIFFERENTIAL    Collection Time: 02/14/24 12:45 PM   Result Value Ref Range     WBC 7.0 4.8 - 10.8 K/uL    RBC 3.07 (L) 4.70 - 6.10 M/uL    Hemoglobin 9.0 (L) 14.0 - 18.0 g/dL    Hematocrit 27.9 (L) 42.0 - 52.0 %    MCV 90.9 81.4 - 97.8 fL    MCH 29.3 27.0 - 33.0 pg    MCHC 32.3 32.3 - 36.5 g/dL    RDW 49.1 35.9 - 50.0 fL    Platelet Count 281 164 - 446 K/uL    MPV 10.0 9.0 - 12.9 fL   ESTIMATED GFR    Collection Time: 02/14/24 12:45 PM   Result Value Ref Range    GFR (CKD-EPI) 24 (A) >60 mL/min/1.73 m 2   POCT glucose device results    Collection Time: 02/14/24  5:31 PM   Result Value Ref Range    POC Glucose, Blood 162 (H) 65 - 99 mg/dL   POCT glucose device results    Collection Time: 02/14/24  9:10 PM   Result Value Ref Range    POC Glucose, Blood 185 (H) 65 - 99 mg/dL   POCT glucose device results    Collection Time: 02/15/24  7:25 AM   Result Value Ref Range    POC Glucose, Blood 106 (H) 65 - 99 mg/dL       Medications:  Scheduled Medications   Medication Dose Frequency    albumin human 25%  12.5 g BID DIURETIC    furosemide  80 mg BID DIURETIC    amLODIPine  5 mg Q DAY    cholestyramine  1 Packet BID    therapeutic multivitamin-minerals  1 Tablet DAILY WITH LUNCH    zinc sulfate  220 mg DAILY WITH LUNCH    ascorbic acid  500 mg BID WITH MEALS    aspirin  81 mg DAILY    senna-docusate  2 Tablet BID    atorvastatin  80 mg Q EVENING    calcium carbonate  500 mg BID WITH MEALS    epoetin  5,000 Units MO, WE + FR    heparin  5,000 Units Q8HRS    insulin GLARGINE  15 Units Q EVENING    insulin regular  3-14 Units 4X/DAY ACHS    omeprazole  20 mg QAM    Pharmacy Consult Request  1 Each PHARMACY TO DOSE     PRN medications: senna-docusate **AND** polyethylene glycol/lytes **AND** bisacodyl, Respiratory Therapy Consult, hydrALAZINE, carboxymethylcellulose, benzocaine-menthol, mag hydrox-al hydrox-simeth, ondansetron **OR** ondansetron, traZODone, sodium chloride, insulin regular **AND** POC blood glucose manual result **AND** NOTIFY MD and PharmD **AND** Administer 20 grams of glucose  (approximately 8 ounces of fruit juice) every 15 minutes PRN FSBG less than 70 mg/dL **AND** dextrose bolus, acetaminophen, heparin, lidocaine PF, oxyCODONE immediate-release **OR** oxyCODONE immediate-release    Diet:  Current Diet Order   Procedures    Diet Order Diet: Consistent CHO (Diabetic); Second Modifier: (optional): Renal       Medical Decision Making and Plan:  Left BKA secondary to acute on chronic osteomyelitis in setting of diabetes mellitus  Osteomyelitis resolved with infection control with BKA and s/p IV antibiotics  PT and OT for mobility and ADLs. Per guidelines, 15 hours per week between PT, OT and/or SLP.  Follow-up orthopedics    2/9 erythematous residual limb on admission.  WBC is reassuring and is normal. Despite erythema, not with any excess warmth compared to rest of residual limb. No purulent. No clinical concern for infection at this time. Possible due to gravity dependency when up with therapy and pressure from wrapping. Continue to monitor closely.     2/13 Clinicaly picture 2/12 with good shape an non-concerning exam.     2/15 Healing well. No c/f distal residual limb     Acute blood loss anemia  Postoperative and CKD, required 1 unit PRBC  Hemoglobin 2/9 is improved at 9.4 --> 8.8 2/12.  Nephrology following     Acute renal failure stage IIIb CKD on HD  S/p right IJ tunneled cath placement 1/29/2024  Hemodialysis Monday, Wednesday, Friday  Fluid positive  2/9 place compression stocking right lower extremity  2/9 discussed with nephrology regarding use of Lasix, initiated.   2/12 HD on HOLD. Hoping for renal recovery.   2/15 Continue HD hold.   Nephrology following     Type 2 diabetes mellitus with hyperglycemia, insulin-dependent  Long-term complications causing CKD, peripheral neuropathy, neurogenic bowel  Continue long-acting and short acting insulin  At home had been on Lantus 36 at night with SSI     Hypocalcemia  Continue supplementing 2/12    Hyperphosphatemia  Resolved on  admission 2.7    Hyponatremia  Patient has specific diet for his GI system  On dialysis  Nephrology following  Stable low 133 2/12    Elevated TSH  Normal free T4  Continue to monitor     History of CAD s/p ARTURO 9/22/2019  Last cardiology note 7/7/2023: On atorvastatin 80 mg, aspirin 81 mg, carvedilol 3.125 mg twice daily, Jardiance 25 mg daily  Continue aspirin 81 mg daily and statin 80 mg nightly     Hypertension  Last cardiology note 7/7/2023: Previously had been on carvedilol 3.125 mg twice daily and enalapril 10 mg twice daily  2/9 blood pressures running in the 130s-150s. Hold on BP medication initiation, potential diuresis.   2/12 blood pressure elevated.  He is on Lasix 60 mg twice daily. D/w nephrology start amlodipine 5mg daily. Hold on BB due to HR, hold on ACE-I due to hopefully return of renal function. Will need to start reducing Lasix.   2/13 BP better controlled. Increase Lasix to 80mg daily - cleared with nephrology.   2/14 still with intermittent readings in the 150s, 160s  2/15 BP better controlled. Preloading lasix with albumin per Nephro     Pain  Currently not reporting pain  As needed Tylenol  As needed oxycodone     Skin  Patient at risk for skin breakdown due to debility in areas including sacrum, achilles, elbows and head in addition to other sites. Nursing to assess skin daily.      GI Ppx  Patient on Prilosec for GERD prophylaxis.      Bowel   Neurogenic bowel secondary to long-term complications from diabetes  Colestyramine at 10 AM and 5 PM  Follows with GI  Patient on Senna-docusate for constipation prophylaxis.   Abnormal frequency of bowel movements at baseline.  Can go 5 to 7 days.     Bladder  TV/PVR/BS PRN  PVRs in 200's, likely positional. Monitor    Left Humeral Fracture 12/2023 s/p ORIF 12/11/23 Dr. Aris Pierre  D/w Dr. Pierre 2/14/2024 and is WBAT with ROM as tolerated L shoulder/arm     Upcoming Labs/imaging: Daily BMP per Nephro to follow renal function off of dialysis      DVT PROPHYLAXIS: Heparin 5000 units q8 hrs     HOSPITALIST FOLLOWING: No. Nephrology following - d/w nephrology 2/13     CODE STATUS: FULL CODE     DISPO: Home with supportive parents.      LISE: 2/22/24     ADDITIONAL MEDICAL NEEDS ON D/C (IV abx, O2, etc): TBD     MEDS SENT TO: TBD     DISCHARGE SPECIALIST FOLLOW UP: Orthopedics ([facility] Segura)     Patient to scheduled follow up with their PCP within 2 weeks from discharge from the [facility].      ____________________________________    Dr. Mena Ny DO, MS  [facility] - Physical Medicine & Rehabilitation   ____________________________________

## 2024-02-15 NOTE — PROGRESS NOTES
Received shift report and assumed care of patient.  Patient awake, calm and stable, currently positioned in w/c for comfort and safety; call light within reach.  Denies pain or discomfort at this time.  Will continue to monitor.

## 2024-02-15 NOTE — THERAPY
Occupational Therapy  Daily Treatment     Patient Name: Beni Moore  Age:  53 y.o., Sex:  male  Medical Record #: [id number]  Today's Date: 2/15/2024     Precautions  Precautions: Fall Risk, Non Weight Bearing Left Lower Extremity, Immobilizer Left Lower Extremity  Comments: L BKA, WBAT on L UE s/p ORIF         Subjective  Pt agreeable to OT session.      Objective     02/15/24 0831   OT Charge Group   OT Self Care / ADL (Units) 4   OT Total Time Spent   OT Individual Total Time Spent (Mins) 60   Vitals   O2 Delivery Device None - Room Air   Functional Level of Assist   Grooming Modified Independent;Seated   Grooming Description Increased time;Initial preparation for task;Seated in wheelchair at sink   Bathing Standby Assist  (sup-set-up to shower while seated)   Bathing Description Set up for shower sleeve;Supervision for safety;Verbal cueing;Increased time;Initial preparation for task;Set-up of equipment   Upper Body Dressing Modified Independent  (to retrieve and don shirt @ w/c level)   Upper Body Dressing Description Verbal cueing;Supervision for safety   Lower Body Dressing Moderate Assist  (total A to don compression sock; pt able to thread BLE into pants, assist for pants over hips pursuit while pt completed w/c pushup)   Lower Body Dressing Description Reacher   Toilet Transfers Standby Assist  (w/c <> bariatric drop arm commode lateral scoot)   Toilet Transfer Description Adaptive equipment;Verbal cueing;Supervision for safety   Tub / Shower Transfers Minimal Assist  (stand pivot with use of GB; w/c <> shower chair)   Tub Shower Transfer Description Grab bar;Verbal cueing;Adaptive equipment   Interdisciplinary Plan of Care Collaboration   Patient Position at End of Therapy Seated;Call Light within Reach     Pt provided with reacher to improve independence and safety with ADLs.     Assessment  Pt had good tolerance to session. He progressed from lateral scoot txfr from w/c <> shower bench to stand pivot  with use of GB. He benefited from use of reacher to don sock and thread BLE into pant legs although continues to require up to mod A for LB dressing. He is motivated to progress in therapies. He endorsed no pain, discomfort, or fatigue after shower and dressing routine.     Strengths: Able to follow instructions, Alert and oriented, Effective communication skills, Good endurance, Good insight into deficits/needs, Independent prior level of function, Motivated for self care and independence, Pleasant and cooperative, Supportive family, Willingly participates in therapeutic activities  Barriers: Decreased endurance, Fatigue, Generalized weakness, Impaired activity tolerance, Impaired balance, Limited mobility    Plan  Fxl txfrs  ADLs/IADLs  Strengthening    DME  OT DME Recommendations  Bathroom Equipment:  (Pt has acquired necessary DME for bathroom to support safety and independence with ADLs.)    Passport items to be completed:  Perform bathroom transfers, complete dressing, complete feeding, get ready for the day, prepare a simple meal, participate in household tasks, adapt home for safety needs, demonstrate home exercise program, complete caregiver training     Occupational Therapy Goals (Active)       Problem: Bathing       Dates: Start:  02/09/24         Goal: STG-Within one week, patient will bathe w/ CGA and AE/DME as needed       Dates: Start:  02/09/24               Problem: Dressing       Dates: Start:  02/09/24         Goal: STG-Within one week, patient will dress LB w/ min A and AE as needed       Dates: Start:  02/09/24               Problem: IADL's       Dates: Start:  02/09/24         Goal: STG-Within one week, patient will access kitchen area w/ min A and LRAD       Dates: Start:  02/09/24               Problem: OT Long Term Goals       Dates: Start:  02/09/24         Goal: LTG-By discharge, patient will complete basic self care tasks w/ mod I/supervision and AE/DME as needed       Dates: Start:   02/09/24            Goal: LTG-By discharge, patient will complete basic home management w/ supervision and LRAD       Dates: Start:  02/09/24               Problem: Toileting       Dates: Start:  02/09/24         Goal: STG-Within one week, patient will complete toileting tasks w/ min A and AE/DME as needed       Dates: Start:  02/09/24

## 2024-02-15 NOTE — PROGRESS NOTES
Nephrology note  Documentation reviewed  Doing well, no complaints except LE edema  BP well controlled  Creat slightly worse  Low albumin level  Lasix 80 mg iv  BID with albumin preloaded  Holding HD  Will follow

## 2024-02-15 NOTE — THERAPY
Physical Therapy   Daily Treatment     Patient Name: Beni Moore  Age:  53 y.o., Sex:  male  Medical Record #: [id number]  Today's Date: 2/14/2024     Precautions  Precautions: Fall Risk, Non Weight Bearing Left Lower Extremity, Immobilizer Left Lower Extremity  Comments: L BKA; L proximal humerus fx with ORIF (sx 12/06)    Subjective    Pt up in wc, ready for PT     Objective       02/14/24 0831   PT Charge Group   PT Therapeutic Exercise (Units) 3   PT Therapeutic Activities (Units) 1   PT Total Time Spent   PT Individual Total Time Spent (Mins) 60   Wheelchair Functional Level of Assist   Wheelchair Assist Modified Independent   Distance Wheelchair (Feet or Distance) 150+  (pt reports self propelling wc<>dining room for coffee)   Wheelchair Description Extra time   Transfer Functional Level of Assist   Bed, Chair, Wheelchair Transfer Supervised   Bed Chair Wheelchair Transfer Description Adaptive equipment;Squat pivot transfer to wheelchair;Supervision for safety  (lateral scoot without slide board)   Supine Lower Body Exercise   Bridges 3 sets of 10  (L BKA on half bolster wedge)   Knee to Chest 3 sets of 10  (manual stretch knee>chest at end range)   Gluteal Isometrics 3 sets of 10   Other Exercises prone lying 3 x 10 for hamstring curls, hip ext BLE's and shoulder ext LUE.   Comments Pt tolerated prone lying x 15 minutes   Bed Mobility    Supine to Sit Modified Independent   Sit to Supine Modified Independent   Sit to Stand Minimal Assist  (from elevated edge of mat<>FWW)     L shoulder/ scapula with disposable hot pack x 15 minutes prior to AAROM: pt completed supine scapular protraction/retraction, internal/external rotation and manually resisted elbow flex/ext 3 x 10 within tolerable ROM.    Assessment    Pt remains highly motivated, LE flexibility/ROM limited by on-going edema however pt with less edema since last visit from this PT.    Strengths: Able to follow instructions, Alert and oriented, Effective  communication skills, Good carryover of learning, Good insight into deficits/needs, Independent prior level of function, Motivated for self care and independence, Pleasant and cooperative, Supportive family, Willingly participates in therapeutic activities  Barriers: Fatigue, Generalized weakness, Impaired activity tolerance, Impaired balance, Pain    Plan      Continue with mat program, emphasize prone positioning for stretching, AAROM/ PROM and stretching to L shoulder, transfer training from various heights, car transfer, WC mobility outdoors, standing with FWW. Pt is cleared for WBAT LUE    DME  PT DME Recommendations  Wheelchair:  (Pt already has his own WC and slideboard)  Assistive Device: Front Wheeled Walker    Passport items to be completed:  Get in/out of bed safely, in/out of a vehicle, safely use mobility device, walk or wheel around home/community, navigate up and down stairs, show how to get up/down from the ground, ensure home is accessible, demonstrate HEP, complete caregiver training    Physical Therapy Problems (Active)       Problem: Balance       Dates: Start:  02/09/24         Goal: STG-Within one week, patient will maintain static standing in parallel bars Gina with single UE support for 10 seconds in order to facilitate completion of standing ADLs       Dates: Start:  02/09/24               Problem: Mobility       Dates: Start:  02/09/24         Goal: STG-Within one week, patient will propel wheelchair community distances of at least 150' independently       Dates: Start:  02/09/24               Problem: Mobility Transfers       Dates: Start:  02/09/24         Goal: STG-Within one week, patient will sit to stand from WC Gina with UE pushing from WC        Dates: Start:  02/09/24               Problem: PT-Long Term Goals       Dates: Start:  02/09/24         Goal: LTG-By discharge, patient will tolerate standing with intermittent UE support for at least 1 minute CGA in order to facilitate  standing ADLs        Dates: Start:  02/09/24            Goal: LTG-By discharge, patient will perform squat pivot transfer from one surface to another SPV       Dates: Start:  02/09/24            Goal: LTG-By discharge, patient will perform home exercise program independently        Dates: Start:  02/09/24            Goal: LTG-By discharge, patient will transfer in/out of a car using LRAD or no AD CGA       Dates: Start:  02/09/24

## 2024-02-15 NOTE — THERAPY
Occupational Therapy  Daily Treatment     Patient Name: Beni Moore  Age:  53 y.o., Sex:  male  Medical Record #: [id number]  Today's Date: 2/15/2024     Precautions  Precautions: Fall Risk, Non Weight Bearing Left Lower Extremity, Immobilizer Left Lower Extremity  Comments: L BKA, WBAT on L UE s/p ORIF    Subjective    Pt pleasant and motivated to participate in OT     Objective     02/15/24 1031   OT Charge Group   Charges Yes   OT Therapeutic Exercise (Units) 2   OT Total Time Spent   OT Individual Total Time Spent (Mins) 30   Precautions   Precautions Fall Risk;Non Weight Bearing Left Lower Extremity;Immobilizer Left Lower Extremity   Comments L BKA, WBAT on L UE s/p ORIF   Vitals   O2 Delivery Device None - Room Air   Pain   Intervention Declines   Cognition    Level of Consciousness Alert   ABS (Agitated Behavior Scale)   Agitated Behavior Scale Performed No   Sleep/Wake Cycle   Sleep & Rest Awake;Out of bed   Functional Level of Assist   Bed, Chair, Wheelchair Transfer Standby Assist   Bed Chair Wheelchair Transfer Description Increased time;Set-up of equipment;Supervision for safety;Verbal cueing   Sitting Lower Body Exercises   Nustep Resistance Level 5  (Pt tolerated 15 minutes w/o rest break)   Interdisciplinary Plan of Care Collaboration   Patient Position at End of Therapy Seated;Chair Alarm On;Call Light within Reach;Tray Table within Reach;Phone within Reach   Strengths & Barriers   Strengths Able to follow instructions;Alert and oriented;Effective communication skills;Good endurance;Good insight into deficits/needs;Independent prior level of function;Motivated for self care and independence;Pleasant and cooperative;Supportive family;Willingly participates in therapeutic activities   Barriers Decreased endurance;Fatigue;Generalized weakness;Impaired activity tolerance;Impaired balance;Limited mobility     Assessment    Pt seen for tx, addressing functional transfers and strengthening. Pt tolerated  activity well. Pt progressing towards goals. Continue OT POC.    Strengths: Able to follow instructions, Alert and oriented, Effective communication skills, Good endurance, Good insight into deficits/needs, Independent prior level of function, Motivated for self care and independence, Pleasant and cooperative, Supportive family, Willingly participates in therapeutic activities    Barriers: Decreased endurance, Fatigue, Generalized weakness, Impaired activity tolerance, Impaired balance, Limited mobility    Plan    Fxl txfrs  ADLs/IADLs  Strengthening    DME  OT DME Recommendations  Bathroom Equipment:  (Pt has acquired necessary DME for bathroom to support safety and independence with ADLs.)    Passport items to be completed:  Perform bathroom transfers, complete dressing, complete feeding, get ready for the day, prepare a simple meal, participate in household tasks, adapt home for safety needs, demonstrate home exercise program, complete caregiver training     Occupational Therapy Goals (Active)       Problem: Bathing       Dates: Start:  02/09/24         Goal: STG-Within one week, patient will bathe w/ CGA and AE/DME as needed       Dates: Start:  02/09/24               Problem: Dressing       Dates: Start:  02/09/24         Goal: STG-Within one week, patient will dress LB w/ min A and AE as needed       Dates: Start:  02/09/24               Problem: IADL's       Dates: Start:  02/09/24         Goal: STG-Within one week, patient will access kitchen area w/ min A and LRAD       Dates: Start:  02/09/24               Problem: OT Long Term Goals       Dates: Start:  02/09/24         Goal: LTG-By discharge, patient will complete basic self care tasks w/ mod I/supervision and AE/DME as needed       Dates: Start:  02/09/24            Goal: LTG-By discharge, patient will complete basic home management w/ supervision and LRAD       Dates: Start:  02/09/24               Problem: Toileting       Dates: Start:  02/09/24          Goal: STG-Within one week, patient will complete toileting tasks w/ min A and AE/DME as needed       Dates: Start:  02/09/24

## 2024-02-15 NOTE — THERAPY
Physical Therapy   Daily Treatment     Patient Name: Beni Moore  Age:  53 y.o., Sex:  male  Medical Record #: [id number]  Today's Date: 2/14/2024     Precautions  Precautions: (P) Fall Risk, Non Weight Bearing Left Lower Extremity, Immobilizer Left Lower Extremity  Comments: (P) L BKA, WBAT on L UE s/p ORIF    Subjective    Pt is in his wc and agreeable to participate in therapy.     Objective       02/14/24 1431   PT Charge Group   Charges Yes   PT Therapeutic Exercise (Units) 1   PT Neuromuscular Re-Education / Balance (Units) 2   PT Therapeutic Activities (Units) 1   PT Total Time Spent   PT Individual Total Time Spent (Mins) 60   Precautions   Precautions Fall Risk;Non Weight Bearing Left Lower Extremity;Immobilizer Left Lower Extremity   Comments L BKA, WBAT on L UE s/p ORIF   Wheelchair Functional Level of Assist   Wheelchair Assist Modified Independent   Distance Wheelchair (Feet or Distance) 150   Wheelchair Description Extra time   Transfer Functional Level of Assist   Bed, Chair, Wheelchair Transfer Supervised   Bed Chair Wheelchair Transfer Description Adaptive equipment;Squat pivot transfer to wheelchair;Supervision for safety   Standing Lower Body Exercises   Hip Flexion 3 sets of 10;Left   Hip Extension 3 sets of 10;Left   Hip Abduction 3 sets of 10;Left   Comments performed in // bars   Neuro-Muscular Treatments   Neuro-Muscular Treatments Biofeedback;Postural Facilitation;Verbal Cuing;Sequencing;Anterior weight shift;Tactile Cuing   Comments Facilitated multiple sit to stand transfer from WC in // bars, Min A to CGA, static standing in // bars x 5 mins x 3 sets and standing in // bars while performing L LE exercises. Seated core strengthening   Interdisciplinary Plan of Care Collaboration   IDT Collaboration with  Physical Therapist;Occupational Therapist   Patient Position at End of Therapy Seated;Call Light within Reach;Tray Table within Reach;Phone within Reach   Collaboration Comments CLOF and  WB on L UE         Assessment    Pt performed multiple sit to stand transfer from  in // bars, Min A- CGA with cueing for sequence and hand placement. Pt performed static standing in // bars for 5 mins x 3 rds and while performing L LE standing thera ex. PT provided cueing for upright posture and WBAT on L UE. Instructed in seated core exercises on the mat consisting of: russian twist using 4lbs ball, reaching activity in different height in a diagonal pattern and abdominal isometrics by pushing both hands downward on his walker x 5s hold. He performed squat pivot transfer wc<>mat, supervision.   Strengths: Able to follow instructions, Alert and oriented, Effective communication skills, Good carryover of learning, Good insight into deficits/needs, Independent prior level of function, Motivated for self care and independence, Pleasant and cooperative, Supportive family, Willingly participates in therapeutic activities  Barriers: Fatigue, Generalized weakness, Impaired activity tolerance, Impaired balance, Pain    Plan      Continue with mat program, emphasize prone positioning for stretching, AAROM/ PROM and stretching to L shoulder, transfer training from various heights, car transfer,  mobility outdoors, standing with FWW. Pt is cleared for WBAT LUE       DME  PT DME Recommendations  Wheelchair:  (Pt already has his own WC and slideboard)  Assistive Device: Front Wheeled Walker    Passport items to be completed:  Get in/out of bed safely, in/out of a vehicle, safely use mobility device, walk or wheel around home/community, navigate up and down stairs, show how to get up/down from the ground, ensure home is accessible, demonstrate HEP, complete caregiver training    Physical Therapy Problems (Active)       Problem: Balance       Dates: Start:  02/09/24         Goal: STG-Within one week, patient will maintain static standing in parallel bars Gina with single UE support for 10 seconds in order to facilitate  completion of standing ADLs       Dates: Start:  02/09/24               Problem: Mobility       Dates: Start:  02/09/24         Goal: STG-Within one week, patient will propel wheelchair community distances of at least 150' independently       Dates: Start:  02/09/24               Problem: Mobility Transfers       Dates: Start:  02/09/24         Goal: STG-Within one week, patient will sit to stand from WC Gina with UE pushing from WC        Dates: Start:  02/09/24               Problem: PT-Long Term Goals       Dates: Start:  02/09/24         Goal: LTG-By discharge, patient will tolerate standing with intermittent UE support for at least 1 minute CGA in order to facilitate standing ADLs        Dates: Start:  02/09/24            Goal: LTG-By discharge, patient will perform squat pivot transfer from one surface to another SPV       Dates: Start:  02/09/24            Goal: LTG-By discharge, patient will perform home exercise program independently        Dates: Start:  02/09/24            Goal: LTG-By discharge, patient will transfer in/out of a car using LRAD or no AD CGA       Dates: Start:  02/09/24

## 2024-02-16 ENCOUNTER — APPOINTMENT (OUTPATIENT)
Dept: PHYSICAL THERAPY | Facility: REHABILITATION | Age: 54
DRG: 559 | End: 2024-02-16
Attending: PHYSICAL MEDICINE & REHABILITATION
Payer: MEDICARE

## 2024-02-16 ENCOUNTER — APPOINTMENT (OUTPATIENT)
Dept: OCCUPATIONAL THERAPY | Facility: REHABILITATION | Age: 54
DRG: 559 | End: 2024-02-16
Attending: PHYSICAL MEDICINE & REHABILITATION
Payer: MEDICARE

## 2024-02-16 ENCOUNTER — PATIENT MESSAGE (OUTPATIENT)
Dept: HEALTH INFORMATION MANAGEMENT | Facility: OTHER | Age: 54
End: 2024-02-16

## 2024-02-16 ENCOUNTER — TELEPHONE (OUTPATIENT)
Dept: MEDICAL GROUP | Facility: PHYSICIAN GROUP | Age: 54
End: 2024-02-16
Payer: MEDICARE

## 2024-02-16 LAB
ANION GAP SERPL CALC-SCNC: 9 MMOL/L (ref 7–16)
BUN SERPL-MCNC: 54 MG/DL (ref 8–22)
CALCIUM SERPL-MCNC: 7.7 MG/DL (ref 8.5–10.5)
CHLORIDE SERPL-SCNC: 105 MMOL/L (ref 96–112)
CO2 SERPL-SCNC: 20 MMOL/L (ref 20–33)
CREAT SERPL-MCNC: 2.79 MG/DL (ref 0.5–1.4)
GFR SERPLBLD CREATININE-BSD FMLA CKD-EPI: 26 ML/MIN/1.73 M 2
GLUCOSE BLD STRIP.AUTO-MCNC: 105 MG/DL (ref 65–99)
GLUCOSE BLD STRIP.AUTO-MCNC: 150 MG/DL (ref 65–99)
GLUCOSE BLD STRIP.AUTO-MCNC: 182 MG/DL (ref 65–99)
GLUCOSE BLD STRIP.AUTO-MCNC: 200 MG/DL (ref 65–99)
GLUCOSE BLD STRIP.AUTO-MCNC: 200 MG/DL (ref 65–99)
GLUCOSE SERPL-MCNC: 104 MG/DL (ref 65–99)
POTASSIUM SERPL-SCNC: 4.9 MMOL/L (ref 3.6–5.5)
SODIUM SERPL-SCNC: 134 MMOL/L (ref 135–145)

## 2024-02-16 PROCEDURE — 700102 HCHG RX REV CODE 250 W/ 637 OVERRIDE(OP): Performed by: PHYSICAL MEDICINE & REHABILITATION

## 2024-02-16 PROCEDURE — 97530 THERAPEUTIC ACTIVITIES: CPT

## 2024-02-16 PROCEDURE — A9270 NON-COVERED ITEM OR SERVICE: HCPCS | Performed by: PHYSICAL MEDICINE & REHABILITATION

## 2024-02-16 PROCEDURE — 770010 HCHG ROOM/CARE - REHAB SEMI PRIVAT*

## 2024-02-16 PROCEDURE — 99232 SBSQ HOSP IP/OBS MODERATE 35: CPT | Performed by: PHYSICAL MEDICINE & REHABILITATION

## 2024-02-16 PROCEDURE — 97032 APPL MODALITY 1+ESTIM EA 15: CPT | Mod: CQ

## 2024-02-16 PROCEDURE — 36415 COLL VENOUS BLD VENIPUNCTURE: CPT

## 2024-02-16 PROCEDURE — P9047 ALBUMIN (HUMAN), 25%, 50ML: HCPCS | Mod: JZ | Performed by: INTERNAL MEDICINE

## 2024-02-16 PROCEDURE — 97112 NEUROMUSCULAR REEDUCATION: CPT

## 2024-02-16 PROCEDURE — 97535 SELF CARE MNGMENT TRAINING: CPT

## 2024-02-16 PROCEDURE — 80048 BASIC METABOLIC PNL TOTAL CA: CPT

## 2024-02-16 PROCEDURE — 82962 GLUCOSE BLOOD TEST: CPT | Mod: 91

## 2024-02-16 PROCEDURE — 97110 THERAPEUTIC EXERCISES: CPT

## 2024-02-16 PROCEDURE — 700111 HCHG RX REV CODE 636 W/ 250 OVERRIDE (IP): Performed by: PHYSICAL MEDICINE & REHABILITATION

## 2024-02-16 PROCEDURE — 700111 HCHG RX REV CODE 636 W/ 250 OVERRIDE (IP): Mod: JZ | Performed by: INTERNAL MEDICINE

## 2024-02-16 RX ADMIN — OXYCODONE HYDROCHLORIDE AND ACETAMINOPHEN 500 MG: 500 TABLET ORAL at 17:43

## 2024-02-16 RX ADMIN — HEPARIN SODIUM 5000 UNITS: 5000 INJECTION, SOLUTION INTRAVENOUS; SUBCUTANEOUS at 21:00

## 2024-02-16 RX ADMIN — ALBUMIN (HUMAN) 12.5 G: 12.5 SOLUTION INTRAVENOUS at 15:40

## 2024-02-16 RX ADMIN — ASPIRIN 81 MG: 81 TABLET, COATED ORAL at 05:55

## 2024-02-16 RX ADMIN — AMLODIPINE BESYLATE 5 MG: 5 TABLET ORAL at 05:55

## 2024-02-16 RX ADMIN — FUROSEMIDE 80 MG: 10 INJECTION INTRAMUSCULAR; INTRAVENOUS at 05:55

## 2024-02-16 RX ADMIN — Medication 1 TABLET: at 15:34

## 2024-02-16 RX ADMIN — ALBUMIN (HUMAN) 12.5 G: 12.5 SOLUTION INTRAVENOUS at 06:21

## 2024-02-16 RX ADMIN — OXYCODONE HYDROCHLORIDE AND ACETAMINOPHEN 500 MG: 500 TABLET ORAL at 05:55

## 2024-02-16 RX ADMIN — CALCIUM 500 MG: 500 TABLET ORAL at 17:43

## 2024-02-16 RX ADMIN — INSULIN HUMAN 3 UNITS: 100 INJECTION, SOLUTION PARENTERAL at 17:46

## 2024-02-16 RX ADMIN — CALCIUM 500 MG: 500 TABLET ORAL at 08:17

## 2024-02-16 RX ADMIN — FUROSEMIDE 80 MG: 10 INJECTION INTRAMUSCULAR; INTRAVENOUS at 19:49

## 2024-02-16 RX ADMIN — Medication 220 MG: at 15:34

## 2024-02-16 RX ADMIN — CHOLESTYRAMINE 4 G: 4 POWDER, FOR SUSPENSION ORAL at 17:43

## 2024-02-16 RX ADMIN — ATORVASTATIN CALCIUM 80 MG: 40 TABLET, FILM COATED ORAL at 20:38

## 2024-02-16 RX ADMIN — CHOLESTYRAMINE 4 G: 4 POWDER, FOR SUSPENSION ORAL at 11:32

## 2024-02-16 RX ADMIN — HEPARIN SODIUM 5000 UNITS: 5000 INJECTION, SOLUTION INTRAVENOUS; SUBCUTANEOUS at 15:35

## 2024-02-16 RX ADMIN — INSULIN GLARGINE-YFGN 15 UNITS: 100 INJECTION, SOLUTION SUBCUTANEOUS at 20:40

## 2024-02-16 RX ADMIN — HEPARIN SODIUM 5000 UNITS: 5000 INJECTION, SOLUTION INTRAVENOUS; SUBCUTANEOUS at 05:55

## 2024-02-16 RX ADMIN — OMEPRAZOLE 20 MG: 20 CAPSULE, DELAYED RELEASE ORAL at 08:17

## 2024-02-16 RX ADMIN — TRAZODONE HYDROCHLORIDE 50 MG: 50 TABLET ORAL at 20:38

## 2024-02-16 RX ADMIN — INSULIN HUMAN 3 UNITS: 100 INJECTION, SOLUTION PARENTERAL at 20:40

## 2024-02-16 ASSESSMENT — PAIN DESCRIPTION - PAIN TYPE
TYPE: ACUTE PAIN
TYPE: ACUTE PAIN

## 2024-02-16 NOTE — CARE PLAN
The patient is Stable - Low risk of patient condition declining or worsening    Shift Goals  Clinical Goals: Safety, DM mgmt, infection prevention, sleep  Patient Goals: go home    Progress made toward(s) clinical / shift goals:    Problem: Knowledge Deficit - Standard  Goal: Patient and family/care givers will demonstrate understanding of plan of care, disease process/condition, diagnostic tests and medications  Outcome: Progressing   Patient educated on the POC and medications administered. All questions and concerns addressed at this time.   Problem: Skin Integrity  Goal: Skin integrity is maintained or improved  Outcome: Progressing   BKA is covered with the appropriate dressing   Problem: Fall Risk - Rehab  Goal: Patient will remain free from falls  Outcome: Progressing  Bed is locked and in low position. Call light and belongings within reach  Problem: Pain - Standard  Goal: Alleviation of pain or a reduction in pain to the patient’s comfort goal  Outcome: Progressing   Use of 0-10 pain scale. Patient is able to verbalize pain and discomfort appropriately      Patient is not progressing towards the following goals:

## 2024-02-16 NOTE — PROGRESS NOTES
Nephrology Daily Progress Note    Date of Service  2/15/2024    Chief Complaint  53 y.o. male admitted 1/25/2024 with diabetic foot infection, complicated by AXEL on CKD stage III, required dialysis    Interval Problem Update  Patient has no chest pain or shortness of breath today  He is being evaluated for inpatient rehab  2/8 patient has no new complaints  Plan to transfer to rehab today  2/9 patient has no new complaints, feels better overall  2/10 pt is doing better, no CP, no SOB  2/12 -doing better, good UOP  Creat stable -off dialysis  LE edema -on lasix, low salt diet  2/15 -doing better  Edema improving  Off dialysis  CVC checked  Review of Systems  Review of Systems   Constitutional:  Negative for chills, fever, malaise/fatigue and weight loss.   HENT:  Negative for congestion, hearing loss and sinus pain.    Eyes: Negative.    Respiratory:  Negative for cough, hemoptysis, shortness of breath and wheezing.    Cardiovascular:  Positive for leg swelling. Negative for chest pain, palpitations and orthopnea.   Gastrointestinal:  Negative for abdominal pain, nausea and vomiting.   Genitourinary:  Negative for dysuria, flank pain, frequency, hematuria and urgency.   Skin: Negative.    All other systems reviewed and are negative.       Physical Exam  Temp:  [36.4 °C (97.6 °F)-36.8 °C (98.2 °F)] 36.6 °C (97.8 °F)  Pulse:  [79-84] 82  Resp:  [17-18] 17  BP: (137-156)/(73-81) 156/81  SpO2:  [98 %-100 %] 100 %    Physical Exam  Vitals reviewed.   Constitutional:       General: He is not in acute distress.     Appearance: Normal appearance. He is well-developed. He is not diaphoretic.   HENT:      Head: Normocephalic and atraumatic.      Nose: Nose normal.      Mouth/Throat:      Mouth: Mucous membranes are moist.      Pharynx: Oropharynx is clear.   Eyes:      Extraocular Movements: Extraocular movements intact.      Conjunctiva/sclera: Conjunctivae normal.      Pupils: Pupils are equal, round, and reactive to light.    Cardiovascular:      Rate and Rhythm: Normal rate and regular rhythm.      Pulses: Normal pulses.      Heart sounds: Normal heart sounds.   Pulmonary:      Effort: Pulmonary effort is normal. No respiratory distress.      Breath sounds: Normal breath sounds. No wheezing or rales.   Abdominal:      General: Bowel sounds are normal. There is no distension.      Palpations: Abdomen is soft. There is no mass.      Tenderness: There is no abdominal tenderness. There is no right CVA tenderness or left CVA tenderness.   Musculoskeletal:      Cervical back: Normal range of motion and neck supple.      Right lower leg: Edema present.      Left lower leg: Edema present.   Skin:     General: Skin is warm.      Coloration: Skin is not pale.      Findings: No erythema or rash.   Neurological:      General: No focal deficit present.      Mental Status: He is alert and oriented to person, place, and time.      Cranial Nerves: No cranial nerve deficit.      Coordination: Coordination normal.   Psychiatric:         Mood and Affect: Mood normal.         Behavior: Behavior normal.         Thought Content: Thought content normal.         Judgment: Judgment normal.         Fluids    Intake/Output Summary (Last 24 hours) at 2/15/2024 2133  Last data filed at 2/15/2024 2024  Gross per 24 hour   Intake 1540 ml   Output 4300 ml   Net -2760 ml       Laboratory  Recent Labs     02/14/24  1245   WBC 7.0   RBC 3.07*   HEMOGLOBIN 9.0*   HEMATOCRIT 27.9*   MCV 90.9   MCH 29.3   MCHC 32.3   RDW 49.1   PLATELETCT 281   MPV 10.0     Recent Labs     02/13/24  1300 02/14/24  1245 02/15/24  1000   SODIUM 130* 130* 133*   POTASSIUM 4.7 4.8 4.7   CHLORIDE 101 102 103   CO2 20 18* 19*   GLUCOSE 160* 139* 141*   BUN 50* 54* 49*   CREATININE 2.70* 2.94* 2.86*   CALCIUM 7.3* 7.6* 7.8*         Recent Labs     02/13/24  1300   NTPROBNP 3138*           Imaging  No orders to display         Assessment/Plan  1 AXEL on CKD stage IIIb due to ATN -holding dialysis  -watching for recovery  2 septic shock -improved -stable  3 anemia: low hb level -to monitor  4 osteomyelitis  5 Edema -improving with iv lasix, pre albumin treatment    Recs:  no acute need for HD, evaluate daily  Continue current treatment  Renal diet  Daily BMP, CBC.  Renal dose all meds  Avoid nephrotoxins like NSAIDs., iv contrast  Will follow

## 2024-02-16 NOTE — THERAPY
Physical Therapy   Daily Treatment     Patient Name: Beni Moore  Age:  53 y.o., Sex:  male  Medical Record #: [id number]  Today's Date: 2/15/2024     Precautions  Precautions: Fall Risk, Non Weight Bearing Left Lower Extremity, Immobilizer Left Lower Extremity  Comments: L BKA, WBAT on L UE s/p ORIF    Subjective    Pt resting in bed, willing to participate     Objective       02/15/24 1501   PT Charge Group   PT Therapeutic Exercise (Units) 3   PT Therapeutic Activities (Units) 1   PT Total Time Spent   PT Individual Total Time Spent (Mins) 60   Gait Functional Level of Assist    Gait Level Of Assist Unable to Participate   Transfer Functional Level of Assist   Bed, Chair, Wheelchair Transfer Standby Assist   Bed Chair Wheelchair Transfer Description Adaptive equipment;Increased time;Supervision for safety   Supine Lower Body Exercise   Bridges Two Legged;3 sets of 10  (LE's on therapy ball)   Trunk Rotation 3 sets of 10  (LE's propped on therapy ball)   Knee to Chest 3 sets of 10  (manual stretch knee>chest at end range)   Other Exercises prone lying 3 x 10 for hanstring curls, hip ext and prop on elbows for scapular protraction/retraction   Standing Lower Body Exercises   Hip Flexion 2 sets of 10;Left   Hip Extension 2 sets of 10;Left   Hip Abduction 2 sets of 10;Left   Comments pt tolerated standing 4 minutes x 2 with UE support at // bars   Bed Mobility    Sit to Stand Moderate Assist  (wc to // bars)     Figure 8 ace wrap completed with verbal instruction throughout wrapping for pt observation and education regarding limb shaping and desensitization.    Assessment    Pt remains highly motivated but required increased assist for sit,>stand transition due to fatigue and increased edema today.     Strengths: Able to follow instructions, Alert and oriented, Effective communication skills, Good carryover of learning, Good insight into deficits/needs, Independent prior level of function, Motivated for self care  and independence, Pleasant and cooperative, Supportive family, Willingly participates in therapeutic activities  Barriers: Fatigue, Generalized weakness, Impaired activity tolerance, Impaired balance, Pain    Plan    Continue with mat program, emphasize prone positioning for stretching, AAROM/ PROM and stretching to L shoulder, transfer training from various heights, car transfer, WC mobility outdoors, standing with FWW. Pt is cleared for WBAT LUE      DME  PT DME Recommendations  Wheelchair:  (Pt already has his own WC and slideboard)  Assistive Device: Front Wheeled Walker    Passport items to be completed:  Get in/out of bed safely, in/out of a vehicle, safely use mobility device, walk or wheel around home/community, navigate up and down stairs, show how to get up/down from the ground, ensure home is accessible, demonstrate HEP, complete caregiver training    Physical Therapy Problems (Active)       Problem: Balance       Dates: Start:  02/09/24         Goal: STG-Within one week, patient will maintain static standing in parallel bars Gina with single UE support for 10 seconds in order to facilitate completion of standing ADLs       Dates: Start:  02/09/24               Problem: Mobility       Dates: Start:  02/09/24         Goal: STG-Within one week, patient will propel wheelchair community distances of at least 150' independently       Dates: Start:  02/09/24               Problem: Mobility Transfers       Dates: Start:  02/09/24         Goal: STG-Within one week, patient will sit to stand from WC Gina with UE pushing from WC        Dates: Start:  02/09/24               Problem: PT-Long Term Goals       Dates: Start:  02/09/24         Goal: LTG-By discharge, patient will tolerate standing with intermittent UE support for at least 1 minute CGA in order to facilitate standing ADLs        Dates: Start:  02/09/24            Goal: LTG-By discharge, patient will perform squat pivot transfer from one surface to another  SPV       Dates: Start:  02/09/24            Goal: LTG-By discharge, patient will perform home exercise program independently        Dates: Start:  02/09/24            Goal: LTG-By discharge, patient will transfer in/out of a car using LRAD or no AD CGA       Dates: Start:  02/09/24

## 2024-02-16 NOTE — PROGRESS NOTES
NURSING DAILY NOTE    Name: Beni Moore   Date of Admission: 2/8/2024   Admitting Diagnosis: Hx of BKA, left (HCC)  Attending Physician: Mena Ny D.o.  Allergies: Patient has no known allergies.    Safety  Patient Assist  min a  Patient Precautions  Fall Risk, Non Weight Bearing Left Lower Extremity, Immobilizer Left Lower Extremity  Precaution Comments  L BKA, WBAT on L UE s/p ORIF  Bed Transfer Status  Standby Assist  Toilet Transfer Status   Standby Assist (w/c <> bariatric drop arm commode lateral scoot)  Assistive Devices  Wheelchair, Rails  Oxygen  None - Room Air  Diet/Therapeutic Dining  Current Diet Order   Procedures    Diet Order Diet: Consistent CHO (Diabetic); Second Modifier: (optional): Renal     Pill Administration  whole  Agitated Behavioral Scale     ABS Level of Severity       Fall Risk  Has the patient had a fall this admission?   No  Florecita Roldan Fall Risk Scoring  22, HIGH RISK  Fall Risk Safety Measures  bed alarm, chair alarm, poor balance, and low vision/ hearing    Vitals  Temperature: 36.6 °C (97.9 °F)  Temp src: Oral  Pulse: 80  Respiration: 18  Blood Pressure: 136/69  Blood Pressure MAP (Calculated): 91 MM HG  BP Location: Left, Upper Arm  Patient BP Position: Supine     Oxygen  Pulse Oximetry: 98 %  O2 (LPM): 0  O2 Delivery Device: None - Room Air    Bowel and Bladder  Last Bowel Movement  02/15/24  Stool Type  Type 4: Like a sausage or snake, smooth and soft  Bowel Device  Bathroom  Continent  Bladder: Continent void   Bowel: Continent movement  Bladder Function  Urine Void (mL): 450 ml (urinal)  Number of Times Voided: 1  Urine Color: Yellow  Genitourinary Assessment   Bladder Assessment (WDL):  Within Defined Limits  Farrell Catheter: Not Applicable  Urinary Elimination:  (able to void)  Urine Color: Yellow  Bladder Device: Urinal  Time Void: Yes  Bladder Scan: Post Void  $ Bladder Scan Results (mL):  78    Skin  Johnny Score   18  Sensory Interventions   Bed Types: Standard/Trauma Mattress with Overlay  Skin Preventative Measures: Pillows in Use for Support / Positioning  Moisture Interventions  Moisturizers/Barriers: Barrier Wipes      Pain  Pain Rating Scale  0 - No Pain  Pain Location  Shoulder  Pain Location Orientation  Left  Pain Interventions   Declines    ADLs    Bathing   Shower, * * With Assistance from, Staff  Linen Change      Personal Hygiene     Chlorhexidine Bath      Oral Care     Teeth/Dentures     Shave     Nutrition Percentage Eaten  Dinner, Between % Consumed  Environmental Precautions  Treaded Slipper Socks on Patient, Personal Belongings, Wastebasket, Call Bell etc. in Easy Reach, Transferred to Stronger Side  Patient Turns/Positioning  Patient Turns Self from Side to Side  Patient Turns Assistance/Tolerance  Assistance of One  Bed Positions  Bed Controls On, Bed Locked  Head of Bed Elevated  Self regulated      Psychosocial/Neurologic Assessment  Psychosocial Assessment  Psychosocial (WDL):  Within Defined Limits  Neurologic Assessment  Neuro (WDL): Exceptions to WDL  Level of Consciousness: Alert  Orientation Level: Oriented X4  Cognition: Follows commands, Appropriate attention/concentration  Speech: Clear  EENT (WDL):  WDL Except    Cardio/Pulmonary Assessment  Edema   LLE Edema: 1+  Respiratory Breath Sounds  RUL Breath Sounds: Clear  RML Breath Sounds: Clear  RLL Breath Sounds: Clear  CHRSI Breath Sounds: Clear  LLL Breath Sounds: Clear  Cardiac Assessment   Cardiac (WDL):  WDL Except (hx of htn)

## 2024-02-16 NOTE — THERAPY
Physical Therapy   Daily Treatment     Patient Name: Beni Moore  Age:  53 y.o., Sex:  male  Medical Record #: [id number]  Today's Date: 2/16/2024     Precautions  Precautions: Fall Risk, Non Weight Bearing Left Lower Extremity, Immobilizer Left Lower Extremity  Comments: L BKA, WBAT on L UE s/p ORIF    Subjective    Pt seated in w/c upon arrival, c/o L shoulder pain but agreeable to session.     Objective       02/16/24 0831   PT Charge Group   PT Electrical Stimulation Attended (Units) 1   PT Therapeutic Activities (Units) 1   Supervising Physical Therapist Cassia Wilson   PT Total Time Spent   PT Individual Total Time Spent (Mins) 30   Cognition    Level of Consciousness Alert   Interdisciplinary Plan of Care Collaboration   Patient Position at End of Therapy Seated;Call Light within Reach;Tray Table within Reach;Phone within Reach   Physical Therapist Assigned   Assigned PT / Treatment Time / Comments Cassia     Estim and MH applied to L shoulder ~ 20 mins during session.  Removed electrodes post session, Skin checked after removing electrodes, no ABN signs.    Review residual limb wrapping and desensitization technique.     Assessment    Pt demonstrated good understanding in modalities and the importance regarding residual limb wrapping. Pt's highly motivated however was limited by L shoulder pain during session.      Strengths: Able to follow instructions, Alert and oriented, Effective communication skills, Good carryover of learning, Good insight into deficits/needs, Independent prior level of function, Motivated for self care and independence, Pleasant and cooperative, Supportive family, Willingly participates in therapeutic activities  Barriers: Fatigue, Generalized weakness, Impaired activity tolerance, Impaired balance, Pain    Plan    Continue with mat program, emphasize prone positioning for stretching, AAROM/ PROM and stretching to L shoulder, transfer training from various heights, car transfer, WC  mobility outdoors, standing with FWW. Pt is cleared for WBAT LUE       DME  PT DME Recommendations  Wheelchair:  (Pt already has his own WC and slideboard)  Assistive Device: Front Wheeled Walker     Passport items to be completed:  Get in/out of bed safely, in/out of a vehicle, safely use mobility device, walk or wheel around home/community, navigate up and down stairs, show how to get up/down from the ground, ensure home is accessible, demonstrate HEP, complete caregiver training    Physical Therapy Problems (Active)       Problem: Balance       Dates: Start:  02/09/24         Goal: STG-Within one week, patient will maintain static standing in parallel bars Gina with single UE support for 10 seconds in order to facilitate completion of standing ADLs       Dates: Start:  02/09/24               Problem: Mobility       Dates: Start:  02/09/24         Goal: STG-Within one week, patient will propel wheelchair community distances of at least 150' independently       Dates: Start:  02/09/24               Problem: Mobility Transfers       Dates: Start:  02/09/24         Goal: STG-Within one week, patient will sit to stand from WC Gina with UE pushing from WC        Dates: Start:  02/09/24               Problem: PT-Long Term Goals       Dates: Start:  02/09/24         Goal: LTG-By discharge, patient will tolerate standing with intermittent UE support for at least 1 minute CGA in order to facilitate standing ADLs        Dates: Start:  02/09/24            Goal: LTG-By discharge, patient will perform squat pivot transfer from one surface to another SPV       Dates: Start:  02/09/24            Goal: LTG-By discharge, patient will perform home exercise program independently        Dates: Start:  02/09/24            Goal: LTG-By discharge, patient will transfer in/out of a car using LRAD or no AD CGA       Dates: Start:  02/09/24

## 2024-02-16 NOTE — THERAPY
"Physical Therapy   Daily Treatment     Patient Name: Beni Moore  Age:  53 y.o., Sex:  male  Medical Record #: [id number]  Today's Date: 2/16/2024     Precautions  Precautions: Fall Risk, Non Weight Bearing Left Lower Extremity, Immobilizer Left Lower Extremity  Comments: L BKA, WBAT on L UE s/p ORIF    Subjective    Patient reports left shoulder pain, started this morning, unknown reason, attributes to \"maybe overdoing it yesterday?\"; reports significant water weight loss since yesterday     Objective       02/16/24 0931   PT Charge Group   PT Therapeutic Exercise (Units) 2   PT Neuromuscular Re-Education / Balance (Units) 1   PT Therapeutic Activities (Units) 1   PT Total Time Spent   PT Individual Total Time Spent (Mins) 60   Precautions   Precautions Fall Risk;Non Weight Bearing Left Lower Extremity;Immobilizer Left Lower Extremity   Comments L BKA, WBAT on L UE s/p ORIF   Transfer Functional Level of Assist   Bed, Chair, Wheelchair Transfer Standby Assist   Supine Lower Body Exercise   Comments supine PROM 30sec bilaterally- hamstring, ITB, groi,. right calf; AROM 10reps each- quad kicks, glut isometrics, quad sets, hip abduction/adduction; prone hip stretching 2min, 2 x 10 hamstring curls   Bed Mobility    Supine to Sit Standby Assist   Sit to Supine Standby Assist   Sit to Stand Standby Assist  (from elevated mat to FWW 6x approx 1min with CGA)   Interdisciplinary Plan of Care Collaboration   IDT Collaboration with  Physical Therapist;Physical Therapist Assistant (PTA)   Collaboration Comments treatment planning, TENS trial for shoulder pain       Assessment    Able to perform 6 STS from elevated mat to FWW with CGA for approx 1min each, limited by balance and right LE fatigue, no increase in shoulder pain with STS work    Strengths: Able to follow instructions, Alert and oriented, Effective communication skills, Good carryover of learning, Good insight into deficits/needs, Independent prior level of " function, Motivated for self care and independence, Pleasant and cooperative, Supportive family, Willingly participates in therapeutic activities  Barriers: Fatigue, Generalized weakness, Impaired activity tolerance, Impaired balance, Pain    Plan    Continue with mat program, emphasize prone positioning for stretching, AAROM/ PROM and stretching to L shoulder, transfer training from various heights, car transfer, WC mobility outdoors, standing with FWW. Pt is cleared for WBAT LUE       DME  PT DME Recommendations  Wheelchair:  (Pt already has his own WC and slideboard)  Assistive Device: Front Wheeled Walker     Passport items to be completed:  Get in/out of bed safely, in/out of a vehicle, safely use mobility device, walk or wheel around home/community, navigate up and down stairs, show how to get up/down from the ground, ensure home is accessible, demonstrate HEP, complete caregiver training    Physical Therapy Problems (Active)       Problem: Balance       Dates: Start:  02/09/24         Goal: STG-Within one week, patient will maintain static standing in parallel bars Gina with single UE support for 10 seconds in order to facilitate completion of standing ADLs       Dates: Start:  02/09/24               Problem: Mobility       Dates: Start:  02/09/24         Goal: STG-Within one week, patient will propel wheelchair community distances of at least 150' independently       Dates: Start:  02/09/24               Problem: Mobility Transfers       Dates: Start:  02/09/24         Goal: STG-Within one week, patient will sit to stand from WC Gina with UE pushing from WC        Dates: Start:  02/09/24               Problem: PT-Long Term Goals       Dates: Start:  02/09/24         Goal: LTG-By discharge, patient will tolerate standing with intermittent UE support for at least 1 minute CGA in order to facilitate standing ADLs        Dates: Start:  02/09/24            Goal: LTG-By discharge, patient will perform squat pivot  transfer from one surface to another SPV       Dates: Start:  02/09/24            Goal: LTG-By discharge, patient will perform home exercise program independently        Dates: Start:  02/09/24            Goal: LTG-By discharge, patient will transfer in/out of a car using LRAD or no AD CGA       Dates: Start:  02/09/24

## 2024-02-16 NOTE — PROGRESS NOTES
Physical Medicine & Rehabilitation Progress Note    Encounter Date: 2/16/2024    Chief Complaint: Trying to get orthotic    Interval Events (Subjective):  Vital signs: Still intermittently elevated blood pressure into the 150s, range systolic is from 129-155  Voiding volitionally  Movement 2/15    Patient seen and examined in his room. He is doing well. He is trying to get his orthotic paperwork signed so that he can get it to work with therapy. Otherwise happy about BP and kidney function.       ROS: 14 point ROS negative unless otherwise specified in the HPI    Objective:  VITAL SIGNS: /69   Pulse 80   Temp 36.6 °C (97.9 °F) (Oral)   Resp 18   Ht 1.829 m (6')   Wt 102 kg (225 lb)   SpO2 98%   BMI 30.52 kg/m²     GEN: No apparent distress  HEENT: Head normocephalic, atraumatic.  Sclera nonicteric bilaterally, no ocular discharge appreciated bilaterally.  CV: Extremities warm and well-perfused, edematous extremities.  PULMONARY: Breathing nonlabored on room air, no respiratory accessory muscle use.  Not requiring supplemental oxygen.  ABD: Soft, nontender.  SKIN: No appreciable skin breakdown on exposed areas of skin.  PSYCH: Mood and affect within normal limits.  NEURO: Awake alert.  Conversational.  Logical thought content.  MSK: Clinical picture from 2/12      Clinical picture 2/15      Laboratory Values:  Recent Results (from the past 72 hour(s))   POCT glucose device results    Collection Time: 02/13/24 11:15 AM   Result Value Ref Range    POC Glucose, Blood 150 (H) 65 - 99 mg/dL   Basic Metabolic Panel    Collection Time: 02/13/24  1:00 PM   Result Value Ref Range    Sodium 130 (L) 135 - 145 mmol/L    Potassium 4.7 3.6 - 5.5 mmol/L    Chloride 101 96 - 112 mmol/L    Co2 20 20 - 33 mmol/L    Glucose 160 (H) 65 - 99 mg/dL    Bun 50 (H) 8 - 22 mg/dL    Creatinine 2.70 (H) 0.50 - 1.40 mg/dL    Calcium 7.3 (L) 8.5 - 10.5 mg/dL    Anion Gap 9.0 7.0 - 16.0   proBrain Natriuretic Peptide, NT     Collection Time: 02/13/24  1:00 PM   Result Value Ref Range    NT-proBNP 3138 (H) 0 - 125 pg/mL   Renal Function Panel    Collection Time: 02/13/24  1:00 PM   Result Value Ref Range    Correct Calcium 8.8 8.5 - 10.5 mg/dL    Phosphorus 2.4 (L) 2.5 - 4.5 mg/dL    Albumin 2.1 (L) 3.2 - 4.9 g/dL   ESTIMATED GFR    Collection Time: 02/13/24  1:00 PM   Result Value Ref Range    GFR (CKD-EPI) 27 (A) >60 mL/min/1.73 m 2   POCT glucose device results    Collection Time: 02/13/24  5:06 PM   Result Value Ref Range    POC Glucose, Blood 189 (H) 65 - 99 mg/dL   POCT glucose device results    Collection Time: 02/13/24  8:47 PM   Result Value Ref Range    POC Glucose, Blood 195 (H) 65 - 99 mg/dL   POCT glucose device results    Collection Time: 02/14/24  7:38 AM   Result Value Ref Range    POC Glucose, Blood 105 (H) 65 - 99 mg/dL   POCT glucose device results    Collection Time: 02/14/24 11:35 AM   Result Value Ref Range    POC Glucose, Blood 141 (H) 65 - 99 mg/dL   Basic Metabolic Panel    Collection Time: 02/14/24 12:45 PM   Result Value Ref Range    Sodium 130 (L) 135 - 145 mmol/L    Potassium 4.8 3.6 - 5.5 mmol/L    Chloride 102 96 - 112 mmol/L    Co2 18 (L) 20 - 33 mmol/L    Glucose 139 (H) 65 - 99 mg/dL    Bun 54 (H) 8 - 22 mg/dL    Creatinine 2.94 (H) 0.50 - 1.40 mg/dL    Calcium 7.6 (L) 8.5 - 10.5 mg/dL    Anion Gap 10.0 7.0 - 16.0   CBC WITHOUT DIFFERENTIAL    Collection Time: 02/14/24 12:45 PM   Result Value Ref Range    WBC 7.0 4.8 - 10.8 K/uL    RBC 3.07 (L) 4.70 - 6.10 M/uL    Hemoglobin 9.0 (L) 14.0 - 18.0 g/dL    Hematocrit 27.9 (L) 42.0 - 52.0 %    MCV 90.9 81.4 - 97.8 fL    MCH 29.3 27.0 - 33.0 pg    MCHC 32.3 32.3 - 36.5 g/dL    RDW 49.1 35.9 - 50.0 fL    Platelet Count 281 164 - 446 K/uL    MPV 10.0 9.0 - 12.9 fL   ESTIMATED GFR    Collection Time: 02/14/24 12:45 PM   Result Value Ref Range    GFR (CKD-EPI) 24 (A) >60 mL/min/1.73 m 2   POCT glucose device results    Collection Time: 02/14/24  5:31 PM   Result  Value Ref Range    POC Glucose, Blood 162 (H) 65 - 99 mg/dL   POCT glucose device results    Collection Time: 02/14/24  9:10 PM   Result Value Ref Range    POC Glucose, Blood 185 (H) 65 - 99 mg/dL   POCT glucose device results    Collection Time: 02/15/24  7:25 AM   Result Value Ref Range    POC Glucose, Blood 106 (H) 65 - 99 mg/dL   Basic Metabolic Panel    Collection Time: 02/15/24 10:00 AM   Result Value Ref Range    Sodium 133 (L) 135 - 145 mmol/L    Potassium 4.7 3.6 - 5.5 mmol/L    Chloride 103 96 - 112 mmol/L    Co2 19 (L) 20 - 33 mmol/L    Glucose 141 (H) 65 - 99 mg/dL    Bun 49 (H) 8 - 22 mg/dL    Creatinine 2.86 (H) 0.50 - 1.40 mg/dL    Calcium 7.8 (L) 8.5 - 10.5 mg/dL    Anion Gap 11.0 7.0 - 16.0   ESTIMATED GFR    Collection Time: 02/15/24 10:00 AM   Result Value Ref Range    GFR (CKD-EPI) 25 (A) >60 mL/min/1.73 m 2   POCT glucose device results    Collection Time: 02/15/24 11:27 AM   Result Value Ref Range    POC Glucose, Blood 129 (H) 65 - 99 mg/dL   POCT glucose device results    Collection Time: 02/15/24  5:26 PM   Result Value Ref Range    POC Glucose, Blood 168 (H) 65 - 99 mg/dL   POCT glucose device results    Collection Time: 02/15/24  8:14 PM   Result Value Ref Range    POC Glucose, Blood 200 (H) 65 - 99 mg/dL   Basic Metabolic Panel    Collection Time: 02/16/24  6:01 AM   Result Value Ref Range    Sodium 134 (L) 135 - 145 mmol/L    Potassium 4.9 3.6 - 5.5 mmol/L    Chloride 105 96 - 112 mmol/L    Co2 20 20 - 33 mmol/L    Glucose 104 (H) 65 - 99 mg/dL    Bun 54 (H) 8 - 22 mg/dL    Creatinine 2.79 (H) 0.50 - 1.40 mg/dL    Calcium 7.7 (L) 8.5 - 10.5 mg/dL    Anion Gap 9.0 7.0 - 16.0   ESTIMATED GFR    Collection Time: 02/16/24  6:01 AM   Result Value Ref Range    GFR (CKD-EPI) 26 (A) >60 mL/min/1.73 m 2   POCT glucose device results    Collection Time: 02/16/24  7:47 AM   Result Value Ref Range    POC Glucose, Blood 105 (H) 65 - 99 mg/dL       Medications:  Scheduled Medications   Medication Dose  Frequency    albumin human 25%  12.5 g BID DIURETIC    furosemide  80 mg BID DIURETIC    amLODIPine  5 mg Q DAY    cholestyramine  1 Packet BID    therapeutic multivitamin-minerals  1 Tablet DAILY WITH LUNCH    zinc sulfate  220 mg DAILY WITH LUNCH    ascorbic acid  500 mg BID WITH MEALS    aspirin  81 mg DAILY    senna-docusate  2 Tablet BID    atorvastatin  80 mg Q EVENING    calcium carbonate  500 mg BID WITH MEALS    epoetin  5,000 Units MO, WE + FR    heparin  5,000 Units Q8HRS    insulin GLARGINE  15 Units Q EVENING    insulin regular  3-14 Units 4X/DAY ACHS    omeprazole  20 mg QAM    Pharmacy Consult Request  1 Each PHARMACY TO DOSE     PRN medications: senna-docusate **AND** polyethylene glycol/lytes **AND** bisacodyl, Respiratory Therapy Consult, hydrALAZINE, carboxymethylcellulose, benzocaine-menthol, mag hydrox-al hydrox-simeth, ondansetron **OR** ondansetron, traZODone, sodium chloride, insulin regular **AND** POC blood glucose manual result **AND** NOTIFY MD and PharmD **AND** Administer 20 grams of glucose (approximately 8 ounces of fruit juice) every 15 minutes PRN FSBG less than 70 mg/dL **AND** dextrose bolus, acetaminophen, heparin, lidocaine PF, oxyCODONE immediate-release **OR** oxyCODONE immediate-release    Diet:  Current Diet Order   Procedures    Diet Order Diet: Consistent CHO (Diabetic); Second Modifier: (optional): Renal       Medical Decision Making and Plan:  Left BKA secondary to acute on chronic osteomyelitis in setting of diabetes mellitus  Osteomyelitis resolved with infection control with BKA and s/p IV antibiotics  PT and OT for mobility and ADLs. Per guidelines, 15 hours per week between PT, OT and/or SLP.  Follow-up orthopedics    2/9 erythematous residual limb on admission.  WBC is reassuring and is normal. Despite erythema, not with any excess warmth compared to rest of residual limb. No purulent. No clinical concern for infection at this time. Possible due to gravity  dependency when up with therapy and pressure from wrapping. Continue to monitor closely.     2/13 Clinicaly picture 2/12 with good shape an non-concerning exam.     2/15 Healing well. No c/f distal residual limb     Acute blood loss anemia  Postoperative and CKD, required 1 unit PRBC  Hemoglobin 2/9 is improved at 9.4 --> 8.8 2/12.  Nephrology following     Acute renal failure stage IIIb CKD on HD  S/p right IJ tunneled cath placement 1/29/2024  Hemodialysis Monday, Wednesday, Friday  Fluid positive  2/9 place compression stocking right lower extremity  2/9 discussed with nephrology regarding use of Lasix, initiated.   2/12 HD on HOLD. Hoping for renal recovery.   2/16 Continue HD hold.  Renal function stable  Nephrology following     Type 2 diabetes mellitus with hyperglycemia, insulin-dependent  Long-term complications causing CKD, peripheral neuropathy, neurogenic bowel  Continue long-acting and short acting insulin  At home had been on Lantus 36 at night with SSI     Hypocalcemia  Continue supplementing 2/12    Hyperphosphatemia  Resolved on admission 2.7    Hyponatremia  Patient has specific diet for his GI system  On dialysis  Nephrology following  Stable low 133 2/12    Elevated TSH  Normal free T4  Continue to monitor     History of CAD s/p ARTURO 9/22/2019  Last cardiology note 7/7/2023: On atorvastatin 80 mg, aspirin 81 mg, carvedilol 3.125 mg twice daily, Jardiance 25 mg daily  Continue aspirin 81 mg daily and statin 80 mg nightly     Hypertension  Last cardiology note 7/7/2023: Previously had been on carvedilol 3.125 mg twice daily and enalapril 10 mg twice daily  2/9 blood pressures running in the 130s-150s. Hold on BP medication initiation, potential diuresis.   2/12 blood pressure elevated.  He is on Lasix 60 mg twice daily. D/w nephrology start amlodipine 5mg daily. Hold on BB due to HR, hold on ACE-I due to hopefully return of renal function. Will need to start reducing Lasix.   2/13 BP better  controlled. Increase Lasix to 80mg daily - cleared with nephrology.   2/14 still with intermittent readings in the 150s, 160s  2/16 BP better controlled.  Continue Lasix preloaded with albumin, amlodipine.     Pain  Currently not reporting pain  As needed Tylenol  As needed oxycodone     Skin  Patient at risk for skin breakdown due to debility in areas including sacrum, achilles, elbows and head in addition to other sites. Nursing to assess skin daily.      GI Ppx  Patient on Prilosec for GERD prophylaxis.      Bowel   Neurogenic bowel secondary to long-term complications from diabetes  Colestyramine at 10 AM and 5 PM  Follows with GI  Patient on Senna-docusate for constipation prophylaxis.   Abnormal frequency of bowel movements at baseline.  Can go 5 to 7 days.     Bladder  TV/PVR/BS PRN  PVRs in 200's, likely positional. Monitor    Left Humeral Fracture 12/2023 s/p ORIF 12/11/23 Dr. Aris Pierre  D/w Dr. Pierre 2/14/2024 and is WBAT with ROM as tolerated L shoulder/arm     Upcoming Labs/imaging: Daily BMP per Nephro to follow renal function off of dialysis     DVT PROPHYLAXIS: Heparin 5000 units q8 hrs     HOSPITALIST FOLLOWING: No. Nephrology following      CODE STATUS: FULL CODE     DISPO: Home with supportive parents.      LISE: 2/22/24     ADDITIONAL MEDICAL NEEDS ON D/C (IV abx, O2, etc): TBD     MEDS SENT TO: TBD     DISCHARGE SPECIALIST FOLLOW UP: Orthopedics ([facility] Segura)     Patient to scheduled follow up with their PCP within 2 weeks from discharge from the [facility].      ____________________________________    Dr. Mena Ny DO, MS  [facility] - Physical Medicine & Rehabilitation   ____________________________________

## 2024-02-16 NOTE — TELEPHONE ENCOUNTER
I did contact [facility].  I noticed on the diabetic shoe prescription I need to include a recent note unfortunately had not seen him since May.  The person on the line tell me that he is going to get this done by Dr. Mena Ny since this provider has recently seen him.

## 2024-02-16 NOTE — THERAPY
"Occupational Therapy  Daily Treatment     Patient Name: Beni Moore  Age:  53 y.o., Sex:  male  Medical Record #: [id number]  Today's Date: 2/16/2024     Precautions  Precautions: Fall Risk, Non Weight Bearing Left Lower Extremity, Immobilizer Left Lower Extremity  Comments: L BKA, WBAT on L UE s/p ORIF         Subjective  Patient seated in w/c upon arrival, pleasant and agreeable to OT session. He explained that he has some discomfort in L shoulder near clavicle with AROM/PROM.     Objective     02/16/24 1331   OT Charge Group   OT Self Care / ADL (Units) 2   OT Therapy Activity (Units) 2   OT Total Time Spent   OT Individual Total Time Spent (Mins) 60   Functional Level of Assist   Lower Body Dressing   (SBA to don/doff R sock)   Bed, Chair, Wheelchair Transfer Standby Assist  (lateral scoot)   Interdisciplinary Plan of Care Collaboration   Patient Position at End of Therapy Seated;Call Light within Reach;Tray Table within Reach;Phone within Reach     TENS applied to shoulder for 30 minutes throughout this session for pain control.  No abnormal signs on skin after electrode removal.     Patient presented with improved functional reach and ability to perform figure 4 method with RLE on this date 2/2 decreased swelling. Patient is encouraged by progress in this area.     Patient completed blocked STS training EOM > FWW x 5 reps: 23.5\", 22.5\", 22\", 21\", 21\". He required min A-CGA for STS and standing balance.     Patient completed mock dressing activity while sitting EOM with theraband tied to simulate LB dressing. Patient threaded BLE into \"pant\" loop and pulled over hips at FWW level with min A for STS from 21\" table and min A for standing balance.     Assessment  Patient is progressing and is encouraged by his progress. He is improving with fxl level of independence as his strength, standing tolerance, and swelling improves. He is eager to begin aggressive BUE strengthening but will benefit from beginning with " conservative isometric LUE exercises and ongoing PROM/AROM as tolerated. He presented with balance deficits during mock dressing task when supporting FWW with unilateral UE support.     Strengths: Able to follow instructions, Alert and oriented, Effective communication skills, Good endurance, Good insight into deficits/needs, Independent prior level of function, Motivated for self care and independence, Pleasant and cooperative, Supportive family, Willingly participates in therapeutic activities  Barriers: Decreased endurance, Fatigue, Generalized weakness, Impaired activity tolerance, Impaired balance, Limited mobility    Plan  LUE AROM/PROM with TENS PRN  UE strengthening (with focus on isometrics to LUE)  Fxl txfrs  Standing tolerance  Shower and UE isometric strengthening program with primary OT Sunday 2/18    DME  OT DME Recommendations  Bathroom Equipment:  (Pt has acquired necessary DME for bathroom to support safety and independence with ADLs.)    Passport items to be completed:  Perform bathroom transfers, complete dressing, complete feeding, get ready for the day, prepare a simple meal, participate in household tasks, adapt home for safety needs, demonstrate home exercise program, complete caregiver training     Occupational Therapy Goals (Active)       Problem: Bathing       Dates: Start:  02/09/24         Goal: STG-Within one week, patient will bathe w/ CGA and AE/DME as needed       Dates: Start:  02/09/24               Problem: Dressing       Dates: Start:  02/09/24         Goal: STG-Within one week, patient will dress LB w/ min A and AE as needed       Dates: Start:  02/09/24               Problem: IADL's       Dates: Start:  02/09/24         Goal: STG-Within one week, patient will access kitchen area w/ min A and LRAD       Dates: Start:  02/09/24               Problem: OT Long Term Goals       Dates: Start:  02/09/24         Goal: LTG-By discharge, patient will complete basic self care tasks w/ mod  I/supervision and AE/DME as needed       Dates: Start:  02/09/24            Goal: LTG-By discharge, patient will complete basic home management w/ supervision and LRAD       Dates: Start:  02/09/24               Problem: Toileting       Dates: Start:  02/09/24         Goal: STG-Within one week, patient will complete toileting tasks w/ min A and AE/DME as needed       Dates: Start:  02/09/24

## 2024-02-17 ENCOUNTER — APPOINTMENT (OUTPATIENT)
Dept: OCCUPATIONAL THERAPY | Facility: REHABILITATION | Age: 54
DRG: 559 | End: 2024-02-17
Attending: PHYSICAL MEDICINE & REHABILITATION
Payer: MEDICARE

## 2024-02-17 LAB
ANION GAP SERPL CALC-SCNC: 11 MMOL/L (ref 7–16)
BUN SERPL-MCNC: 55 MG/DL (ref 8–22)
CALCIUM SERPL-MCNC: 7.8 MG/DL (ref 8.5–10.5)
CHLORIDE SERPL-SCNC: 105 MMOL/L (ref 96–112)
CO2 SERPL-SCNC: 19 MMOL/L (ref 20–33)
CREAT SERPL-MCNC: 2.97 MG/DL (ref 0.5–1.4)
ERYTHROCYTE [DISTWIDTH] IN BLOOD BY AUTOMATED COUNT: 49.9 FL (ref 35.9–50)
GFR SERPLBLD CREATININE-BSD FMLA CKD-EPI: 24 ML/MIN/1.73 M 2
GLUCOSE BLD STRIP.AUTO-MCNC: 119 MG/DL (ref 65–99)
GLUCOSE BLD STRIP.AUTO-MCNC: 159 MG/DL (ref 65–99)
GLUCOSE BLD STRIP.AUTO-MCNC: 200 MG/DL (ref 65–99)
GLUCOSE BLD STRIP.AUTO-MCNC: 93 MG/DL (ref 65–99)
GLUCOSE SERPL-MCNC: 91 MG/DL (ref 65–99)
HCT VFR BLD AUTO: 26.3 % (ref 42–52)
HGB BLD-MCNC: 8.5 G/DL (ref 14–18)
MCH RBC QN AUTO: 29.2 PG (ref 27–33)
MCHC RBC AUTO-ENTMCNC: 32.3 G/DL (ref 32.3–36.5)
MCV RBC AUTO: 90.4 FL (ref 81.4–97.8)
NT-PROBNP SERPL IA-MCNC: 6826 PG/ML (ref 0–125)
PHOSPHATE SERPL-MCNC: 3.1 MG/DL (ref 2.5–4.5)
PLATELET # BLD AUTO: 267 K/UL (ref 164–446)
PMV BLD AUTO: 9.8 FL (ref 9–12.9)
POTASSIUM SERPL-SCNC: 4.7 MMOL/L (ref 3.6–5.5)
RBC # BLD AUTO: 2.91 M/UL (ref 4.7–6.1)
SODIUM SERPL-SCNC: 135 MMOL/L (ref 135–145)
WBC # BLD AUTO: 4.9 K/UL (ref 4.8–10.8)

## 2024-02-17 PROCEDURE — A9270 NON-COVERED ITEM OR SERVICE: HCPCS | Performed by: PHYSICAL MEDICINE & REHABILITATION

## 2024-02-17 PROCEDURE — 770010 HCHG ROOM/CARE - REHAB SEMI PRIVAT*

## 2024-02-17 PROCEDURE — 97530 THERAPEUTIC ACTIVITIES: CPT

## 2024-02-17 PROCEDURE — 80048 BASIC METABOLIC PNL TOTAL CA: CPT

## 2024-02-17 PROCEDURE — 700102 HCHG RX REV CODE 250 W/ 637 OVERRIDE(OP): Performed by: PHYSICAL MEDICINE & REHABILITATION

## 2024-02-17 PROCEDURE — 83880 ASSAY OF NATRIURETIC PEPTIDE: CPT

## 2024-02-17 PROCEDURE — 84100 ASSAY OF PHOSPHORUS: CPT

## 2024-02-17 PROCEDURE — 36415 COLL VENOUS BLD VENIPUNCTURE: CPT

## 2024-02-17 PROCEDURE — 82962 GLUCOSE BLOOD TEST: CPT | Mod: 91

## 2024-02-17 PROCEDURE — P9047 ALBUMIN (HUMAN), 25%, 50ML: HCPCS | Mod: JZ | Performed by: INTERNAL MEDICINE

## 2024-02-17 PROCEDURE — 85027 COMPLETE CBC AUTOMATED: CPT

## 2024-02-17 PROCEDURE — 700111 HCHG RX REV CODE 636 W/ 250 OVERRIDE (IP): Performed by: PHYSICAL MEDICINE & REHABILITATION

## 2024-02-17 PROCEDURE — 97535 SELF CARE MNGMENT TRAINING: CPT

## 2024-02-17 PROCEDURE — 700111 HCHG RX REV CODE 636 W/ 250 OVERRIDE (IP): Mod: JZ | Performed by: INTERNAL MEDICINE

## 2024-02-17 PROCEDURE — 99232 SBSQ HOSP IP/OBS MODERATE 35: CPT | Performed by: INTERNAL MEDICINE

## 2024-02-17 RX ORDER — AMOXICILLIN 250 MG
2 CAPSULE ORAL
Status: DISCONTINUED | OUTPATIENT
Start: 2024-02-17 | End: 2024-02-22 | Stop reason: HOSPADM

## 2024-02-17 RX ORDER — POLYETHYLENE GLYCOL 3350 17 G/17G
1 POWDER, FOR SOLUTION ORAL
Status: DISCONTINUED | OUTPATIENT
Start: 2024-02-17 | End: 2024-02-22 | Stop reason: HOSPADM

## 2024-02-17 RX ORDER — BISACODYL 10 MG
10 SUPPOSITORY, RECTAL RECTAL
Status: DISCONTINUED | OUTPATIENT
Start: 2024-02-17 | End: 2024-02-22 | Stop reason: HOSPADM

## 2024-02-17 RX ADMIN — OXYCODONE HYDROCHLORIDE AND ACETAMINOPHEN 500 MG: 500 TABLET ORAL at 17:01

## 2024-02-17 RX ADMIN — ASPIRIN 81 MG: 81 TABLET, COATED ORAL at 05:35

## 2024-02-17 RX ADMIN — INSULIN HUMAN 3 UNITS: 100 INJECTION, SOLUTION PARENTERAL at 20:31

## 2024-02-17 RX ADMIN — FUROSEMIDE 80 MG: 10 INJECTION INTRAMUSCULAR; INTRAVENOUS at 05:38

## 2024-02-17 RX ADMIN — FUROSEMIDE 80 MG: 10 INJECTION INTRAMUSCULAR; INTRAVENOUS at 17:01

## 2024-02-17 RX ADMIN — AMLODIPINE BESYLATE 5 MG: 5 TABLET ORAL at 05:35

## 2024-02-17 RX ADMIN — ALBUMIN (HUMAN) 12.5 G: 12.5 SOLUTION INTRAVENOUS at 05:03

## 2024-02-17 RX ADMIN — CALCIUM 500 MG: 500 TABLET ORAL at 17:01

## 2024-02-17 RX ADMIN — TRAZODONE HYDROCHLORIDE 50 MG: 50 TABLET ORAL at 20:27

## 2024-02-17 RX ADMIN — HEPARIN SODIUM 5000 UNITS: 5000 INJECTION, SOLUTION INTRAVENOUS; SUBCUTANEOUS at 21:00

## 2024-02-17 RX ADMIN — Medication 1 TABLET: at 14:57

## 2024-02-17 RX ADMIN — OMEPRAZOLE 20 MG: 20 CAPSULE, DELAYED RELEASE ORAL at 08:24

## 2024-02-17 RX ADMIN — HEPARIN SODIUM 5000 UNITS: 5000 INJECTION, SOLUTION INTRAVENOUS; SUBCUTANEOUS at 14:57

## 2024-02-17 RX ADMIN — ALBUMIN (HUMAN) 12.5 G: 12.5 SOLUTION INTRAVENOUS at 15:03

## 2024-02-17 RX ADMIN — INSULIN HUMAN 3 UNITS: 100 INJECTION, SOLUTION PARENTERAL at 17:04

## 2024-02-17 RX ADMIN — CHOLESTYRAMINE 4 G: 4 POWDER, FOR SUSPENSION ORAL at 11:48

## 2024-02-17 RX ADMIN — ATORVASTATIN CALCIUM 80 MG: 40 TABLET, FILM COATED ORAL at 20:21

## 2024-02-17 RX ADMIN — CHOLESTYRAMINE 4 G: 4 POWDER, FOR SUSPENSION ORAL at 17:01

## 2024-02-17 RX ADMIN — HEPARIN SODIUM 5000 UNITS: 5000 INJECTION, SOLUTION INTRAVENOUS; SUBCUTANEOUS at 05:37

## 2024-02-17 RX ADMIN — OXYCODONE HYDROCHLORIDE AND ACETAMINOPHEN 500 MG: 500 TABLET ORAL at 05:35

## 2024-02-17 RX ADMIN — INSULIN GLARGINE-YFGN 15 UNITS: 100 INJECTION, SOLUTION SUBCUTANEOUS at 20:30

## 2024-02-17 RX ADMIN — CALCIUM 500 MG: 500 TABLET ORAL at 08:24

## 2024-02-17 RX ADMIN — Medication 220 MG: at 14:57

## 2024-02-17 ASSESSMENT — ENCOUNTER SYMPTOMS
PALPITATIONS: 0
WEIGHT LOSS: 0
COUGH: 0
NAUSEA: 0
WHEEZING: 0
HEMOPTYSIS: 0
SINUS PAIN: 0
ABDOMINAL PAIN: 0
EYES NEGATIVE: 1
ORTHOPNEA: 0
SHORTNESS OF BREATH: 0
VOMITING: 0
CHILLS: 0
FEVER: 0

## 2024-02-17 ASSESSMENT — PAIN DESCRIPTION - PAIN TYPE: TYPE: ACUTE PAIN

## 2024-02-17 NOTE — THERAPY
Occupational Therapy  Daily Treatment     Patient Name: Beni Moore  Age:  53 y.o., Sex:  male  Medical Record #: [id number]  Today's Date: 2/17/2024     Precautions  Precautions: (P) Fall Risk, Non Weight Bearing Left Lower Extremity, Immobilizer Left Lower Extremity  Comments: (P) L BKA, WBAT on L UE s/p ORIF         Subjective    Pt encountered for tx sitting in WC. Pleasant and agreeable to participate. Pt requested to check his residual limb wound as it has been ~2 days since his last check.      Objective       02/17/24 1101   OT Charge Group   OT Self Care / ADL (Units) 1   OT Therapy Activity (Units) 1   OT Total Time Spent   OT Individual Total Time Spent (Mins) 30   Precautions   Precautions Fall Risk;Non Weight Bearing Left Lower Extremity;Immobilizer Left Lower Extremity   Comments L BKA, WBAT on L UE s/p ORIF   Interdisciplinary Plan of Care Collaboration   IDT Collaboration with  Nursing   Patient Position at End of Therapy Seated;Chair Alarm On;Call Light within Reach;Tray Table within Reach;Phone within Reach   Collaboration Comments handoff to RN for med pass     Residual limb wound check and dressing change/wrap. Wound appears to be healing well with no drainage. OTR facilitated pt participation in self-limb wrapping, pt with limited reach to perform, but was able to demonstrated understanding via return verbalization.     STS at // (lowest level) for 3 x 5 min at [facility] to SBA.       Assessment    Pt with good progress towards goals as he is at SBA for STS from . Good understanding (via return verbalization) and initiation of residual limb care, but not yet able to return demo on self d/t limited reach/mobility.     Strengths: Able to follow instructions, Alert and oriented, Effective communication skills, Good endurance, Good insight into deficits/needs, Independent prior level of function, Motivated for self care and independence, Pleasant and cooperative, Supportive family, Willingly  participates in therapeutic activities  Barriers: Decreased endurance, Fatigue, Generalized weakness, Impaired activity tolerance, Impaired balance, Limited mobility    Plan    Fxl txfrs  ADLs/IADLs  Strengthening    DME  OT DME Recommendations  Bathroom Equipment:  (Pt has acquired necessary DME for bathroom to support safety and independence with ADLs.)    Passport items to be completed:  Perform bathroom transfers, complete dressing, complete feeding, get ready for the day, prepare a simple meal, participate in household tasks, adapt home for safety needs, demonstrate home exercise program, complete caregiver training     Occupational Therapy Goals (Active)       Problem: Bathing       Dates: Start:  02/09/24         Goal: STG-Within one week, patient will bathe w/ CGA and AE/DME as needed       Dates: Start:  02/09/24               Problem: Dressing       Dates: Start:  02/09/24         Goal: STG-Within one week, patient will dress LB w/ min A and AE as needed       Dates: Start:  02/09/24               Problem: IADL's       Dates: Start:  02/09/24         Goal: STG-Within one week, patient will access kitchen area w/ min A and LRAD       Dates: Start:  02/09/24               Problem: OT Long Term Goals       Dates: Start:  02/09/24         Goal: LTG-By discharge, patient will complete basic self care tasks w/ mod I/supervision and AE/DME as needed       Dates: Start:  02/09/24            Goal: LTG-By discharge, patient will complete basic home management w/ supervision and LRAD       Dates: Start:  02/09/24               Problem: Toileting       Dates: Start:  02/09/24         Goal: STG-Within one week, patient will complete toileting tasks w/ min A and AE/DME as needed       Dates: Start:  02/09/24

## 2024-02-17 NOTE — PROGRESS NOTES
NURSING DAILY NOTE    Name: Beni Moore   Date of Admission: 2/8/2024   Admitting Diagnosis: Hx of BKA, left (HCC)  Attending Physician: Mena Ny D.o.  Allergies: Patient has no known allergies.    Safety  Patient Assist  min a  Patient Precautions  Fall Risk, Non Weight Bearing Left Lower Extremity, Immobilizer Left Lower Extremity  Precaution Comments  L BKA, WBAT on L UE s/p ORIF  Bed Transfer Status  Standby Assist (lateral scoot)  Toilet Transfer Status   Standby Assist (w/c <> bariatric drop arm commode lateral scoot)  Assistive Devices  Rails, Wheelchair  Oxygen  None - Room Air  Diet/Therapeutic Dining  Current Diet Order   Procedures    Diet Order Diet: Consistent CHO (Diabetic); Second Modifier: (optional): Renal     Pill Administration  whole  Agitated Behavioral Scale     ABS Level of Severity       Fall Risk  Has the patient had a fall this admission?   No  Florecita Roldan Fall Risk Scoring  22, HIGH RISK  Fall Risk Safety Measures  bed alarm, chair alarm, poor balance, and low vision/ hearing    Vitals  Temperature: 36.7 °C (98 °F)  Temp src: Temporal  Pulse: 77  Respiration: 18  Blood Pressure: 138/76  Blood Pressure MAP (Calculated): 97 MM HG  BP Location: Left, Upper Arm  Patient BP Position: Supine     Oxygen  Pulse Oximetry: 99 %  O2 (LPM): 0  O2 Delivery Device: None - Room Air    Bowel and Bladder  Last Bowel Movement  02/17/24  Stool Type  Type 3: Like a sausage, but with cracks on its surface  Bowel Device  Bathroom  Continent  Bladder: Continent void   Bowel: Continent movement  Bladder Function  Urine Void (mL):  (Large)  Number of Times Voided: 1  Urine Color: Yellow  Genitourinary Assessment   Bladder Assessment (WDL):  Within Defined Limits  Farrell Catheter: Not Applicable  Urinary Elimination:  (able to void)  Urine Color: Yellow  Bladder Device: Urinal  Time Void: Yes  Bladder Scan: Post Void  $ Bladder Scan Results  (mL): 78    Skin  Johnny Score   18  Sensory Interventions   Bed Types: Standard/Trauma Mattress  Skin Preventative Measures: Pillows in Use for Support / Positioning  Moisture Interventions  Moisturizers/Barriers: Barrier Wipes      Pain  Pain Rating Scale  0 - No Pain (sleeping)  Pain Location  Shoulder  Pain Location Orientation  Left  Pain Interventions   Warm Pack    ADLs    Bathing   Shower, * * With Assistance from, Staff  Linen Change   Complete  Personal Hygiene     Chlorhexidine Bath      Oral Care     Teeth/Dentures     Shave     Nutrition Percentage Eaten  Snack, Between % Consumed  Environmental Precautions  Treaded Slipper Socks on Patient, Bed in Low Position  Patient Turns/Positioning  Patient Turns Self from Side to Side  Patient Turns Assistance/Tolerance  Assistance of One  Bed Positions  Bed Controls On, Bed Locked  Head of Bed Elevated  Self regulated      Psychosocial/Neurologic Assessment  Psychosocial Assessment  Psychosocial (WDL):  Within Defined Limits  Neurologic Assessment  Neuro (WDL): Exceptions to WDL  Level of Consciousness: Alert  Orientation Level: Oriented X4  Cognition: Follows commands, Appropriate attention/concentration  Speech: Clear  EENT (WDL):  WDL Except    Cardio/Pulmonary Assessment  Edema   LLE Edema: 1+  Respiratory Breath Sounds  RUL Breath Sounds: Clear  RML Breath Sounds: Clear  RLL Breath Sounds: Clear  CHRIS Breath Sounds: Clear  LLL Breath Sounds: Clear  Cardiac Assessment   Cardiac (WDL):  WDL Except (hx of htn)

## 2024-02-17 NOTE — PROGRESS NOTES
NURSING DAILY NOTE    Name: Beni Moore   Date of Admission: 2/8/2024   Admitting Diagnosis: Hx of BKA, left (HCC)  Attending Physician: Mena Ny D.o.  Allergies: Patient has no known allergies.    Safety  Patient Assist  min a  Patient Precautions  Fall Risk, Non Weight Bearing Left Lower Extremity, Immobilizer Left Lower Extremity  Precaution Comments  L BKA, WBAT on L UE s/p ORIF  Bed Transfer Status  Standby Assist (lateral scoot)  Toilet Transfer Status   Standby Assist (w/c <> bariatric drop arm commode lateral scoot)  Assistive Devices  Wheelchair, Rails  Oxygen  None - Room Air  Diet/Therapeutic Dining  Current Diet Order   Procedures    Diet Order Diet: Consistent CHO (Diabetic); Second Modifier: (optional): Renal     Pill Administration  whole  Agitated Behavioral Scale     ABS Level of Severity       Fall Risk  Has the patient had a fall this admission?   No  Florecita Roldan Fall Risk Scoring  22, HIGH RISK  Fall Risk Safety Measures  bed alarm, chair alarm, poor balance, and low vision/ hearing    Vitals  Temperature: 36.8 °C (98.3 °F)  Temp src: Oral  Pulse: 80  Respiration: 18  Blood Pressure: (!) 148/78  Blood Pressure MAP (Calculated): 101 MM HG  BP Location: Left, Upper Arm  Patient BP Position: Supine     Oxygen  Pulse Oximetry: 98 %  O2 (LPM): 0  O2 Delivery Device: None - Room Air    Bowel and Bladder  Last Bowel Movement  02/15/24  Stool Type  Type 4: Like a sausage or snake, smooth and soft  Bowel Device  Bathroom  Continent  Bladder: Continent void   Bowel: Continent movement  Bladder Function  Urine Void (mL): 700 ml  Number of Times Voided: 1  Urine Color: Yellow  Genitourinary Assessment   Bladder Assessment (WDL):  Within Defined Limits  Farrell Catheter: Not Applicable  Urinary Elimination:  (able to void)  Urine Color: Yellow  Bladder Device: Urinal  Time Void: Yes  Bladder Scan: Post Void  $ Bladder Scan Results (mL):  78    Skin  Johnny Score   18  Sensory Interventions   Bed Types: Standard/Trauma Mattress  Skin Preventative Measures: Pillows in Use for Support / Positioning  Moisture Interventions  Moisturizers/Barriers: Barrier Wipes      Pain  Pain Rating Scale  3 - Sometimes distracts me  Pain Location  Groin, Shoulder  Pain Location Orientation  Left  Pain Interventions   Declines, Repositioned, Rest    ADLs    Bathing   Shower, * * With Assistance from, Staff  Linen Change   Complete  Personal Hygiene     Chlorhexidine Bath      Oral Care     Teeth/Dentures     Shave     Nutrition Percentage Eaten  Dinner  Environmental Precautions  Treaded Slipper Socks on Patient, Personal Belongings, Wastebasket, Call Bell etc. in Easy Reach, Transferred to Stronger Side  Patient Turns/Positioning  Patient Turns Self from Side to Side  Patient Turns Assistance/Tolerance  Assistance of One  Bed Positions  Bed Controls On, Bed Locked  Head of Bed Elevated  Self regulated      Psychosocial/Neurologic Assessment  Psychosocial Assessment  Psychosocial (WDL):  Within Defined Limits  Neurologic Assessment  Neuro (WDL): Exceptions to WDL  Level of Consciousness: Alert  Orientation Level: Oriented X4  Cognition: Follows commands, Appropriate attention/concentration  Speech: Clear  EENT (WDL):  WDL Except    Cardio/Pulmonary Assessment  Edema   LLE Edema: 1+  Respiratory Breath Sounds  RUL Breath Sounds: Clear  RML Breath Sounds: Clear  RLL Breath Sounds: Clear  CHRIS Breath Sounds: Clear  LLL Breath Sounds: Clear  Cardiac Assessment   Cardiac (WDL):  WDL Except (hx of htn)

## 2024-02-17 NOTE — CARE PLAN
"  Problem: Fall Risk - Rehab  Goal: Patient will remain free from falls  Note: Florecita Roldan Fall risk Assessment Score: 22    High fall risk Interventions   - Alarming seatbelt  - Wander guard  - Bed and strip alarm   - Yellow sign by the door   - Yellow wrist band \"Fall risk\"  - Room near to the nurse station  - Do not leave patient unattended in the bathroom  - Fall risk education provided      Problem: Infection - Diabetes  Goal: Patient will remain free from signs and symptoms of infection  Note: FS ac and hs. HS snack given.      Problem: Dialysis  Goal: Patient will maintain stable vital signs and fluid balance  Note: Right chest port double lumen.      Problem: Pain - Standard  Goal: Alleviation of pain or a reduction in pain to the patient’s comfort goal  Note: Educate patient of non-pharmacological comfort measures: repositioning, relaxation/breathing technique, warm compress and activities.   Received patient with no PIV line. Re-insert PIV with Co-RN to left wrist. Able to give 1600 dose of Lasix IV push for 2 minutes but patient c/o pain, checked patency PIV line got infiltrated. Removed. CN notified. Will try again at [phone].       The patient is Watcher - Medium risk of patient condition declining or worsening    Shift Goals  Clinical Goals: Safety  Patient Goals: Participate in therapy        "

## 2024-02-18 ENCOUNTER — APPOINTMENT (OUTPATIENT)
Dept: OCCUPATIONAL THERAPY | Facility: REHABILITATION | Age: 54
DRG: 559 | End: 2024-02-18
Attending: PHYSICAL MEDICINE & REHABILITATION
Payer: MEDICARE

## 2024-02-18 LAB
ANION GAP SERPL CALC-SCNC: 10 MMOL/L (ref 7–16)
BUN SERPL-MCNC: 57 MG/DL (ref 8–22)
CALCIUM SERPL-MCNC: 7.6 MG/DL (ref 8.5–10.5)
CHLORIDE SERPL-SCNC: 105 MMOL/L (ref 96–112)
CO2 SERPL-SCNC: 19 MMOL/L (ref 20–33)
CREAT SERPL-MCNC: 3.15 MG/DL (ref 0.5–1.4)
GFR SERPLBLD CREATININE-BSD FMLA CKD-EPI: 22 ML/MIN/1.73 M 2
GLUCOSE BLD STRIP.AUTO-MCNC: 134 MG/DL (ref 65–99)
GLUCOSE BLD STRIP.AUTO-MCNC: 147 MG/DL (ref 65–99)
GLUCOSE BLD STRIP.AUTO-MCNC: 158 MG/DL (ref 65–99)
GLUCOSE SERPL-MCNC: 146 MG/DL (ref 65–99)
NT-PROBNP SERPL IA-MCNC: 5638 PG/ML (ref 0–125)
PHOSPHATE SERPL-MCNC: 3.2 MG/DL (ref 2.5–4.5)
POTASSIUM SERPL-SCNC: 4.8 MMOL/L (ref 3.6–5.5)
SODIUM SERPL-SCNC: 134 MMOL/L (ref 135–145)

## 2024-02-18 PROCEDURE — A9270 NON-COVERED ITEM OR SERVICE: HCPCS | Performed by: PHYSICAL MEDICINE & REHABILITATION

## 2024-02-18 PROCEDURE — 80048 BASIC METABOLIC PNL TOTAL CA: CPT

## 2024-02-18 PROCEDURE — 83880 ASSAY OF NATRIURETIC PEPTIDE: CPT

## 2024-02-18 PROCEDURE — 97535 SELF CARE MNGMENT TRAINING: CPT

## 2024-02-18 PROCEDURE — 97110 THERAPEUTIC EXERCISES: CPT

## 2024-02-18 PROCEDURE — 700111 HCHG RX REV CODE 636 W/ 250 OVERRIDE (IP): Performed by: PHYSICAL MEDICINE & REHABILITATION

## 2024-02-18 PROCEDURE — P9047 ALBUMIN (HUMAN), 25%, 50ML: HCPCS | Mod: JZ | Performed by: INTERNAL MEDICINE

## 2024-02-18 PROCEDURE — 84100 ASSAY OF PHOSPHORUS: CPT

## 2024-02-18 PROCEDURE — 36415 COLL VENOUS BLD VENIPUNCTURE: CPT

## 2024-02-18 PROCEDURE — 700102 HCHG RX REV CODE 250 W/ 637 OVERRIDE(OP): Performed by: PHYSICAL MEDICINE & REHABILITATION

## 2024-02-18 PROCEDURE — 700111 HCHG RX REV CODE 636 W/ 250 OVERRIDE (IP): Mod: JZ | Performed by: INTERNAL MEDICINE

## 2024-02-18 PROCEDURE — 82962 GLUCOSE BLOOD TEST: CPT | Mod: 91

## 2024-02-18 PROCEDURE — 770010 HCHG ROOM/CARE - REHAB SEMI PRIVAT*

## 2024-02-18 RX ORDER — FUROSEMIDE 10 MG/ML
40 INJECTION INTRAMUSCULAR; INTRAVENOUS
Status: DISCONTINUED | OUTPATIENT
Start: 2024-02-18 | End: 2024-02-20

## 2024-02-18 RX ADMIN — HEPARIN SODIUM 5000 UNITS: 5000 INJECTION, SOLUTION INTRAVENOUS; SUBCUTANEOUS at 20:31

## 2024-02-18 RX ADMIN — INSULIN HUMAN 3 UNITS: 100 INJECTION, SOLUTION PARENTERAL at 17:29

## 2024-02-18 RX ADMIN — AMLODIPINE BESYLATE 5 MG: 5 TABLET ORAL at 05:18

## 2024-02-18 RX ADMIN — INSULIN HUMAN 3 UNITS: 100 INJECTION, SOLUTION PARENTERAL at 10:56

## 2024-02-18 RX ADMIN — HEPARIN SODIUM 5000 UNITS: 5000 INJECTION, SOLUTION INTRAVENOUS; SUBCUTANEOUS at 14:52

## 2024-02-18 RX ADMIN — OMEPRAZOLE 20 MG: 20 CAPSULE, DELAYED RELEASE ORAL at 08:40

## 2024-02-18 RX ADMIN — OXYCODONE HYDROCHLORIDE AND ACETAMINOPHEN 500 MG: 500 TABLET ORAL at 17:26

## 2024-02-18 RX ADMIN — CHOLESTYRAMINE 4 G: 4 POWDER, FOR SUSPENSION ORAL at 10:51

## 2024-02-18 RX ADMIN — ALBUMIN (HUMAN) 12.5 G: 12.5 SOLUTION INTRAVENOUS at 05:12

## 2024-02-18 RX ADMIN — OXYCODONE HYDROCHLORIDE AND ACETAMINOPHEN 500 MG: 500 TABLET ORAL at 05:18

## 2024-02-18 RX ADMIN — HEPARIN SODIUM 5000 UNITS: 5000 INJECTION, SOLUTION INTRAVENOUS; SUBCUTANEOUS at 05:19

## 2024-02-18 RX ADMIN — FUROSEMIDE 80 MG: 10 INJECTION INTRAMUSCULAR; INTRAVENOUS at 06:08

## 2024-02-18 RX ADMIN — INSULIN GLARGINE-YFGN 15 UNITS: 100 INJECTION, SOLUTION SUBCUTANEOUS at 20:37

## 2024-02-18 RX ADMIN — CHOLESTYRAMINE 4 G: 4 POWDER, FOR SUSPENSION ORAL at 17:23

## 2024-02-18 RX ADMIN — FUROSEMIDE 40 MG: 10 INJECTION INTRAMUSCULAR; INTRAVENOUS at 15:21

## 2024-02-18 RX ADMIN — ALBUMIN (HUMAN) 12.5 G: 12.5 SOLUTION INTRAVENOUS at 14:51

## 2024-02-18 RX ADMIN — ASPIRIN 81 MG: 81 TABLET, COATED ORAL at 05:17

## 2024-02-18 RX ADMIN — ATORVASTATIN CALCIUM 80 MG: 40 TABLET, FILM COATED ORAL at 20:31

## 2024-02-18 RX ADMIN — CALCIUM 500 MG: 500 TABLET ORAL at 08:40

## 2024-02-18 RX ADMIN — Medication 220 MG: at 14:52

## 2024-02-18 RX ADMIN — Medication 1 TABLET: at 14:52

## 2024-02-18 RX ADMIN — TRAZODONE HYDROCHLORIDE 50 MG: 50 TABLET ORAL at 20:31

## 2024-02-18 RX ADMIN — CALCIUM 500 MG: 500 TABLET ORAL at 17:26

## 2024-02-18 NOTE — THERAPY
Occupational Therapy  Daily Treatment     Patient Name: Beni Moore  Age:  53 y.o., Sex:  male  Medical Record #: [id number]  Today's Date: 2/18/2024     Precautions  Precautions: Fall Risk, Non Weight Bearing Left Lower Extremity, Immobilizer Left Lower Extremity  Comments: L BKA, WBAT on L UE s/p ORIF         Subjective  Pt pleasant and agreeable to OT session.      Objective     02/18/24 0701   OT Charge Group   OT Self Care / ADL (Units) 4   OT Total Time Spent   OT Individual Total Time Spent (Mins) 60   Vitals   O2 Delivery Device None - Room Air   Functional Level of Assist   Grooming Modified Independent;Seated   Bathing Standby Assist  (set-up assist; distant sup for seated shower)   Bathing Description Adaptive equipment;Grab bar;Hand held shower;Set-up of equipment   Upper Body Dressing Modified Independent  (to retrieve, don, doff shirt @ w/c level)   Lower Body Dressing Contact Guard Assist  (to don/doff elastic waist pants, standing at GB to complete pants over hips pursuit, no AE)   Bed, Chair, Wheelchair Transfer Standby Assist  (lateral scoot)   Tub / Shower Transfers Minimal Assist  (min A w/c > shower bench with GB; CGA-SBA lateral scoot shower bench > w/c)   Interdisciplinary Plan of Care Collaboration   Patient Position at End of Therapy Seated  (in dining room for breakfast)     Pt R foot sliding away from him when initiating 2x txfrs due to not wearing non-skid sock. Pt demonstrated good safety awareness response by sitting down to problem solve. Pt asked to place towel underneath, pt encouraged to wear non-skid sock for all txfrs.     Pt attempted shower sleeve set-up but required some assistance with managing tape.    Assessment  Pt made good progress in his ADL performance on this date. He has good carryover between sessions. He no longer requires reacher to facilitate independence with dressing as his functional reach has improved.   Strengths: Able to follow instructions, Alert and  oriented, Effective communication skills, Good endurance, Good insight into deficits/needs, Independent prior level of function, Motivated for self care and independence, Pleasant and cooperative, Supportive family, Willingly participates in therapeutic activities  Barriers: Decreased endurance, Fatigue, Generalized weakness, Impaired activity tolerance, Impaired balance, Limited mobility    Plan  LUE AROM/PROM with TENS PRN  UE strengthening (with focus on isometrics to LUE)  Fxl txfrs  Standing tolerance    DME  OT DME Recommendations  Bathroom Equipment:  (Pt has acquired necessary DME for bathroom to support safety and independence with ADLs.)    Passport items to be completed:  Perform bathroom transfers, complete dressing, complete feeding, get ready for the day, prepare a simple meal, participate in household tasks, adapt home for safety needs, demonstrate home exercise program, complete caregiver training     Occupational Therapy Goals (Active)       Problem: Bathing       Dates: Start:  02/09/24         Goal: STG-Within one week, patient will bathe w/ CGA and AE/DME as needed       Dates: Start:  02/09/24               Problem: Dressing       Dates: Start:  02/09/24         Goal: STG-Within one week, patient will dress LB w/ min A and AE as needed       Dates: Start:  02/09/24               Problem: IADL's       Dates: Start:  02/09/24         Goal: STG-Within one week, patient will access kitchen area w/ min A and LRAD       Dates: Start:  02/09/24               Problem: OT Long Term Goals       Dates: Start:  02/09/24         Goal: LTG-By discharge, patient will complete basic self care tasks w/ mod I/supervision and AE/DME as needed       Dates: Start:  02/09/24            Goal: LTG-By discharge, patient will complete basic home management w/ supervision and LRAD       Dates: Start:  02/09/24               Problem: Toileting       Dates: Start:  02/09/24         Goal: STG-Within one week, patient will  complete toileting tasks w/ min A and AE/DME as needed       Dates: Start:  02/09/24

## 2024-02-18 NOTE — CARE PLAN
"  Problem: Fall Risk - Rehab  Goal: Patient will remain free from falls  Note: Florecita Roldan Fall risk Assessment Score: 22    High fall risk Interventions   - Alarming seatbelt  - Wander guard  - Bed and strip alarm   - Yellow sign by the door   - Yellow wrist band \"Fall risk\"  - Room near to the nurse station  - Do not leave patient unattended in the bathroom  - Fall risk education provided      Problem: Dialysis  Goal: Patient will maintain stable vital signs and fluid balance  Note: FS ac and hs. HS snack given. Will monitor.       The patient is Watcher - Medium risk of patient condition declining or worsening    Shift Goals  Clinical Goals: Safety  Patient Goals: Participate in therapy        "

## 2024-02-18 NOTE — PROGRESS NOTES
NURSING DAILY NOTE    Name: Beni Moore   Date of Admission: 2/8/2024   Admitting Diagnosis: Hx of BKA, left (HCC)  Attending Physician: Mena Ny D.o.  Allergies: Patient has no known allergies.    Safety  Patient Assist  min a  Patient Precautions  Fall Risk, Non Weight Bearing Left Lower Extremity, Immobilizer Left Lower Extremity  Precaution Comments  L BKA, WBAT on L UE s/p ORIF  Bed Transfer Status  Standby Assist (lateral scoot)  Toilet Transfer Status   Standby Assist (w/c <> bariatric drop arm commode lateral scoot)  Assistive Devices  Rails, Wheelchair  Oxygen  None - Room Air  Diet/Therapeutic Dining  Current Diet Order   Procedures    Diet Order Diet: Consistent CHO (Diabetic); Second Modifier: (optional): Renal     Pill Administration  whole  Agitated Behavioral Scale     ABS Level of Severity       Fall Risk  Has the patient had a fall this admission?   No  Florecita Roldan Fall Risk Scoring  22, HIGH RISK  Fall Risk Safety Measures  bed alarm, poor balance, and low vision/ hearing    Vitals  Temperature: 36.4 °C (97.5 °F)  Temp src: Oral  Pulse: 70  Respiration: 18  Blood Pressure: (!) 149/74  Blood Pressure MAP (Calculated): 99 MM HG  BP Location: Left, Upper Arm  Patient BP Position: Sitting     Oxygen  Pulse Oximetry: 97 %  O2 (LPM): 0  O2 Delivery Device: None - Room Air    Bowel and Bladder  Last Bowel Movement  02/17/24  Stool Type  Type 3: Like a sausage, but with cracks on its surface  Bowel Device  Bathroom  Continent  Bladder: Continent void   Bowel: Continent movement  Bladder Function  Urine Void (mL): 500 ml  Number of Times Voided: 1  Urine Color: Yellow  Genitourinary Assessment   Bladder Assessment (WDL):  Within Defined Limits  Farrell Catheter: Not Applicable  Urinary Elimination:  (able to void)  Urine Color: Yellow  Bladder Device: Urinal  Time Void: Yes  Bladder Scan: Post Void  $ Bladder Scan Results (mL):  78    Skin  Johnny Score   18  Sensory Interventions   Bed Types: Standard/Trauma Mattress  Skin Preventative Measures: Pillows in Use for Support / Positioning  Moisture Interventions  Moisturizers/Barriers: Barrier Wipes      Pain  Pain Rating Scale  0 - No Pain  Pain Location  Shoulder  Pain Location Orientation  Left  Pain Interventions   Warm Pack    ADLs    Bathing   Shower, * * With Assistance from, Staff  Linen Change   Complete  Personal Hygiene     Chlorhexidine Bath      Oral Care     Teeth/Dentures     Shave     Nutrition Percentage Eaten  *  * Meal *  *, Breakfast, Between % Consumed  Environmental Precautions  Treaded Slipper Socks on Patient, Bed in Low Position  Patient Turns/Positioning  Patient Turns Self from Side to Side  Patient Turns Assistance/Tolerance  Assistance of One  Bed Positions  Bed Controls On, Bed Locked  Head of Bed Elevated  Self regulated      Psychosocial/Neurologic Assessment  Psychosocial Assessment  Psychosocial (WDL):  Within Defined Limits  Neurologic Assessment  Neuro (WDL): Exceptions to WDL  Level of Consciousness: Alert  Orientation Level: Oriented X4  Cognition: Follows commands, Appropriate attention/concentration  Speech: Clear  EENT (WDL):  WDL Except    Cardio/Pulmonary Assessment  Edema   LLE Edema: 1+  Respiratory Breath Sounds  RUL Breath Sounds: Clear  RML Breath Sounds: Clear  RLL Breath Sounds: Clear  CHRIS Breath Sounds: Clear  LLL Breath Sounds: Clear  Cardiac Assessment   Cardiac (WDL):  WDL Except (hx of htn)

## 2024-02-18 NOTE — THERAPY
Occupational Therapy  Daily Treatment     Patient Name: Beni Moore  Age:  53 y.o., Sex:  male  Medical Record #: [id number]  Today's Date: 2/18/2024     Precautions  Precautions: Fall Risk, Non Weight Bearing Left Lower Extremity, Immobilizer Left Lower Extremity  Comments: L BKA, WBAT on L UE s/p ORIF         Subjective  Pt agreeable to OT session, requested to work on BLE strengthening.      Objective     02/18/24 1401   OT Charge Group   OT Therapeutic Exercise (Units) 2   OT Total Time Spent   OT Individual Total Time Spent (Mins) 30   Supine Upper Body Exercises   Comments Isometric shoulder exercises, see below for details.   Sitting Lower Body Exercises   Sit to Stand 1 set of 10  (w/c <> FWW with CGA-min A)   Interdisciplinary Plan of Care Collaboration   IDT Collaboration with  Family / Caregiver   Patient Position at End of Therapy Seated;Call Light within Reach;Tray Table within Reach   Collaboration Comments wife present at start of session     Patient completed shoulder isometric exercise for light strengthening to LUE. Complete 3 reps x 10 seconds of: shoulder abduction, external rotation, and shoulder flexion. Used wall for resistance. He endorsed radiating pain with light pressure shoulder abduction and ER, but tolerated shoulder flexion with heavy resistance without any discomfort. Instructed to stop exercises that trigger radiating pain.     Assessment  Pt demonstrated good performance with his first attempts to STS from w/c <> FWW with notable fatigue beginning at ~rep 8. Pt c/o pain during isometric shoulder strengthening exercises, will require ongoing and continued intervention to address LUE deficits with PROM/AROM and isometrics as tolerated.   Strengths: Able to follow instructions, Alert and oriented, Effective communication skills, Good endurance, Good insight into deficits/needs, Independent prior level of function, Motivated for self care and independence, Pleasant and cooperative,  Supportive family, Willingly participates in therapeutic activities  Barriers: Decreased endurance, Fatigue, Generalized weakness, Impaired activity tolerance, Impaired balance, Limited mobility    Plan  LUE AROM/PROM with TENS PRN  UE strengthening (with focus on isometrics to LUE)  Fxl txfrs  Standing tolerance    DME  OT DME Recommendations  Bathroom Equipment:  (Pt has acquired necessary DME for bathroom to support safety and independence with ADLs.)    Passport items to be completed:  Perform bathroom transfers, complete dressing, complete feeding, get ready for the day, prepare a simple meal, participate in household tasks, adapt home for safety needs, demonstrate home exercise program, complete caregiver training     Occupational Therapy Goals (Active)       Problem: Bathing       Dates: Start:  02/09/24         Goal: STG-Within one week, patient will bathe w/ CGA and AE/DME as needed       Dates: Start:  02/09/24               Problem: Dressing       Dates: Start:  02/09/24         Goal: STG-Within one week, patient will dress LB w/ min A and AE as needed       Dates: Start:  02/09/24               Problem: IADL's       Dates: Start:  02/09/24         Goal: STG-Within one week, patient will access kitchen area w/ min A and LRAD       Dates: Start:  02/09/24               Problem: OT Long Term Goals       Dates: Start:  02/09/24         Goal: LTG-By discharge, patient will complete basic self care tasks w/ mod I/supervision and AE/DME as needed       Dates: Start:  02/09/24            Goal: LTG-By discharge, patient will complete basic home management w/ supervision and LRAD       Dates: Start:  02/09/24               Problem: Toileting       Dates: Start:  02/09/24         Goal: STG-Within one week, patient will complete toileting tasks w/ min A and AE/DME as needed       Dates: Start:  02/09/24

## 2024-02-18 NOTE — PROGRESS NOTES
Nephrology Daily Progress Note    Date of Service  2/17/2024    Chief Complaint  53 y.o. male admitted 1/25/2024 with diabetic foot infection, complicated by AXEL on CKD stage III, required dialysis    Interval Problem Update  Patient has no chest pain or shortness of breath today  He is being evaluated for inpatient rehab  2/8 patient has no new complaints  Plan to transfer to rehab today  2/9 patient has no new complaints, feels better overall  2/10 pt is doing better, no CP, no SOB  2/12 -doing better, good UOP  Creat stable -off dialysis  LE edema -on lasix, low salt diet  2/15 -doing better  Edema improving  Off dialysis  CVC checked  Review of Systems  Review of Systems   Constitutional:  Negative for chills, fever, malaise/fatigue and weight loss.   HENT:  Negative for congestion, hearing loss and sinus pain.    Eyes: Negative.    Respiratory:  Negative for cough, hemoptysis, shortness of breath and wheezing.    Cardiovascular:  Positive for leg swelling. Negative for chest pain, palpitations and orthopnea.   Gastrointestinal:  Negative for abdominal pain, nausea and vomiting.   Skin: Negative.         Physical Exam  Temp:  [36.4 °C (97.5 °F)-36.7 °C (98 °F)] 36.4 °C (97.5 °F)  Pulse:  [70-77] 70  Resp:  [18] 18  BP: (138-149)/(74-76) 149/74  SpO2:  [97 %-99 %] 97 %    Physical Exam  Vitals reviewed.   Constitutional:       General: He is not in acute distress.     Appearance: Normal appearance. He is well-developed. He is not diaphoretic.   HENT:      Head: Normocephalic and atraumatic.      Nose: Nose normal.      Mouth/Throat:      Mouth: Mucous membranes are moist.      Pharynx: Oropharynx is clear.   Eyes:      Extraocular Movements: Extraocular movements intact.      Conjunctiva/sclera: Conjunctivae normal.      Pupils: Pupils are equal, round, and reactive to light.   Cardiovascular:      Rate and Rhythm: Normal rate and regular rhythm.      Pulses: Normal pulses.      Heart sounds: Normal heart sounds.    Pulmonary:      Effort: Pulmonary effort is normal. No respiratory distress.      Breath sounds: Normal breath sounds. No wheezing or rales.   Abdominal:      General: Bowel sounds are normal. There is no distension.      Palpations: Abdomen is soft. There is no mass.      Tenderness: There is no abdominal tenderness. There is no right CVA tenderness or left CVA tenderness.   Musculoskeletal:      Cervical back: Normal range of motion and neck supple.      Right lower leg: Edema present.      Left lower leg: Edema present.   Skin:     General: Skin is warm.      Coloration: Skin is not pale.      Findings: No erythema or rash.   Neurological:      General: No focal deficit present.      Mental Status: He is alert and oriented to person, place, and time.      Cranial Nerves: No cranial nerve deficit.      Coordination: Coordination normal.   Psychiatric:         Mood and Affect: Mood normal.         Behavior: Behavior normal.         Thought Content: Thought content normal.         Judgment: Judgment normal.         Fluids    Intake/Output Summary (Last 24 hours) at 2/17/2024 1632  Last data filed at 2/17/2024 1511  Gross per 24 hour   Intake 600 ml   Output 3750 ml   Net -3150 ml       Laboratory  Recent Labs     02/17/24  0550   WBC 4.9   RBC 2.91*   HEMOGLOBIN 8.5*   HEMATOCRIT 26.3*   MCV 90.4   MCH 29.2   MCHC 32.3   RDW 49.9   PLATELETCT 267   MPV 9.8     Recent Labs     02/15/24  1000 02/16/24  0601 02/17/24  0550   SODIUM 133* 134* 135   POTASSIUM 4.7 4.9 4.7   CHLORIDE 103 105 105   CO2 19* 20 19*   GLUCOSE 141* 104* 91   BUN 49* 54* 55*   CREATININE 2.86* 2.79* 2.97*   CALCIUM 7.8* 7.7* 7.8*         Recent Labs     02/17/24  0550   NTPROBNP 6826*           Imaging  No orders to display         Assessment/Plan  1 AXEL on CKD stage IIIb due to ATN -holding dialysis -watching for recovery  2 septic shock -improved -stable  3 anemia: low Hb level -to monitor -continue epogen  4 osteomyelitis  5 Edema  -improving with iv lasix, pre albumin treatment    Recs:  no acute need for HD, evaluate daily  Continue current treatment  Renal diet  Daily BMP, CBC.  Renal dose all meds  Avoid nephrotoxins like NSAIDs., iv contrast  Will follow

## 2024-02-19 ENCOUNTER — APPOINTMENT (OUTPATIENT)
Dept: PHYSICAL THERAPY | Facility: REHABILITATION | Age: 54
DRG: 559 | End: 2024-02-19
Attending: PHYSICAL MEDICINE & REHABILITATION
Payer: MEDICARE

## 2024-02-19 ENCOUNTER — APPOINTMENT (OUTPATIENT)
Dept: OCCUPATIONAL THERAPY | Facility: REHABILITATION | Age: 54
DRG: 559 | End: 2024-02-19
Attending: PHYSICAL MEDICINE & REHABILITATION
Payer: MEDICARE

## 2024-02-19 LAB
ANION GAP SERPL CALC-SCNC: 9 MMOL/L (ref 7–16)
BUN SERPL-MCNC: 60 MG/DL (ref 8–22)
CALCIUM SERPL-MCNC: 7.6 MG/DL (ref 8.5–10.5)
CHLORIDE SERPL-SCNC: 106 MMOL/L (ref 96–112)
CO2 SERPL-SCNC: 19 MMOL/L (ref 20–33)
CREAT SERPL-MCNC: 3.05 MG/DL (ref 0.5–1.4)
ERYTHROCYTE [DISTWIDTH] IN BLOOD BY AUTOMATED COUNT: 49.8 FL (ref 35.9–50)
GFR SERPLBLD CREATININE-BSD FMLA CKD-EPI: 23 ML/MIN/1.73 M 2
GLUCOSE BLD STRIP.AUTO-MCNC: 134 MG/DL (ref 65–99)
GLUCOSE BLD STRIP.AUTO-MCNC: 152 MG/DL (ref 65–99)
GLUCOSE BLD STRIP.AUTO-MCNC: 155 MG/DL (ref 65–99)
GLUCOSE BLD STRIP.AUTO-MCNC: 179 MG/DL (ref 65–99)
GLUCOSE BLD STRIP.AUTO-MCNC: 202 MG/DL (ref 65–99)
GLUCOSE SERPL-MCNC: 163 MG/DL (ref 65–99)
HCT VFR BLD AUTO: 23.6 % (ref 42–52)
HGB BLD-MCNC: 7.7 G/DL (ref 14–18)
MCH RBC QN AUTO: 29.3 PG (ref 27–33)
MCHC RBC AUTO-ENTMCNC: 32.6 G/DL (ref 32.3–36.5)
MCV RBC AUTO: 89.7 FL (ref 81.4–97.8)
PLATELET # BLD AUTO: 232 K/UL (ref 164–446)
PMV BLD AUTO: 10 FL (ref 9–12.9)
POTASSIUM SERPL-SCNC: 4.6 MMOL/L (ref 3.6–5.5)
RBC # BLD AUTO: 2.63 M/UL (ref 4.7–6.1)
SODIUM SERPL-SCNC: 134 MMOL/L (ref 135–145)
WBC # BLD AUTO: 5.4 K/UL (ref 4.8–10.8)

## 2024-02-19 PROCEDURE — 97530 THERAPEUTIC ACTIVITIES: CPT

## 2024-02-19 PROCEDURE — P9047 ALBUMIN (HUMAN), 25%, 50ML: HCPCS | Mod: JZ | Performed by: INTERNAL MEDICINE

## 2024-02-19 PROCEDURE — 36415 COLL VENOUS BLD VENIPUNCTURE: CPT

## 2024-02-19 PROCEDURE — 770010 HCHG ROOM/CARE - REHAB SEMI PRIVAT*

## 2024-02-19 PROCEDURE — 97110 THERAPEUTIC EXERCISES: CPT

## 2024-02-19 PROCEDURE — 82962 GLUCOSE BLOOD TEST: CPT

## 2024-02-19 PROCEDURE — 700111 HCHG RX REV CODE 636 W/ 250 OVERRIDE (IP): Performed by: PHYSICAL MEDICINE & REHABILITATION

## 2024-02-19 PROCEDURE — A9270 NON-COVERED ITEM OR SERVICE: HCPCS | Performed by: PHYSICAL MEDICINE & REHABILITATION

## 2024-02-19 PROCEDURE — 80048 BASIC METABOLIC PNL TOTAL CA: CPT

## 2024-02-19 PROCEDURE — 700102 HCHG RX REV CODE 250 W/ 637 OVERRIDE(OP): Performed by: PHYSICAL MEDICINE & REHABILITATION

## 2024-02-19 PROCEDURE — 99232 SBSQ HOSP IP/OBS MODERATE 35: CPT | Performed by: PHYSICAL MEDICINE & REHABILITATION

## 2024-02-19 PROCEDURE — 85027 COMPLETE CBC AUTOMATED: CPT

## 2024-02-19 PROCEDURE — 700111 HCHG RX REV CODE 636 W/ 250 OVERRIDE (IP): Mod: JZ | Performed by: INTERNAL MEDICINE

## 2024-02-19 RX ORDER — AMLODIPINE BESYLATE 5 MG/1
7.5 TABLET ORAL
Status: DISCONTINUED | OUTPATIENT
Start: 2024-02-20 | End: 2024-02-22 | Stop reason: HOSPADM

## 2024-02-19 RX ORDER — AMLODIPINE BESYLATE 5 MG/1
2.5 TABLET ORAL ONCE
Status: COMPLETED | OUTPATIENT
Start: 2024-02-19 | End: 2024-02-19

## 2024-02-19 RX ADMIN — FUROSEMIDE 40 MG: 10 INJECTION INTRAMUSCULAR; INTRAVENOUS at 16:38

## 2024-02-19 RX ADMIN — INSULIN HUMAN 3 UNITS: 100 INJECTION, SOLUTION PARENTERAL at 07:27

## 2024-02-19 RX ADMIN — HEPARIN SODIUM 5000 UNITS: 5000 INJECTION, SOLUTION INTRAVENOUS; SUBCUTANEOUS at 20:29

## 2024-02-19 RX ADMIN — OXYCODONE HYDROCHLORIDE AND ACETAMINOPHEN 500 MG: 500 TABLET ORAL at 05:10

## 2024-02-19 RX ADMIN — Medication 220 MG: at 14:06

## 2024-02-19 RX ADMIN — ALBUMIN (HUMAN) 12.5 G: 12.5 SOLUTION INTRAVENOUS at 05:21

## 2024-02-19 RX ADMIN — CHOLESTYRAMINE 4 G: 4 POWDER, FOR SUSPENSION ORAL at 09:58

## 2024-02-19 RX ADMIN — ATORVASTATIN CALCIUM 80 MG: 40 TABLET, FILM COATED ORAL at 20:29

## 2024-02-19 RX ADMIN — OMEPRAZOLE 20 MG: 20 CAPSULE, DELAYED RELEASE ORAL at 08:10

## 2024-02-19 RX ADMIN — HEPARIN SODIUM 5000 UNITS: 5000 INJECTION, SOLUTION INTRAVENOUS; SUBCUTANEOUS at 14:07

## 2024-02-19 RX ADMIN — TRAZODONE HYDROCHLORIDE 50 MG: 50 TABLET ORAL at 20:29

## 2024-02-19 RX ADMIN — OXYCODONE HYDROCHLORIDE AND ACETAMINOPHEN 500 MG: 500 TABLET ORAL at 17:27

## 2024-02-19 RX ADMIN — HEPARIN SODIUM 5000 UNITS: 5000 INJECTION, SOLUTION INTRAVENOUS; SUBCUTANEOUS at 05:10

## 2024-02-19 RX ADMIN — INSULIN HUMAN 4 UNITS: 100 INJECTION, SOLUTION PARENTERAL at 17:32

## 2024-02-19 RX ADMIN — AMLODIPINE BESYLATE 2.5 MG: 5 TABLET ORAL at 11:10

## 2024-02-19 RX ADMIN — CHOLESTYRAMINE 4 G: 4 POWDER, FOR SUSPENSION ORAL at 17:24

## 2024-02-19 RX ADMIN — Medication 1 TABLET: at 14:06

## 2024-02-19 RX ADMIN — AMLODIPINE BESYLATE 5 MG: 5 TABLET ORAL at 05:10

## 2024-02-19 RX ADMIN — CALCIUM 500 MG: 500 TABLET ORAL at 08:10

## 2024-02-19 RX ADMIN — CALCIUM 500 MG: 500 TABLET ORAL at 17:27

## 2024-02-19 RX ADMIN — INSULIN GLARGINE-YFGN 15 UNITS: 100 INJECTION, SOLUTION SUBCUTANEOUS at 20:37

## 2024-02-19 RX ADMIN — INSULIN HUMAN 3 UNITS: 100 INJECTION, SOLUTION PARENTERAL at 20:37

## 2024-02-19 RX ADMIN — ASPIRIN 81 MG: 81 TABLET, COATED ORAL at 05:10

## 2024-02-19 RX ADMIN — FUROSEMIDE 40 MG: 10 INJECTION INTRAMUSCULAR; INTRAVENOUS at 05:42

## 2024-02-19 ASSESSMENT — ACTIVITIES OF DAILY LIVING (ADL): BED_CHAIR_WHEELCHAIR_TRANSFER_DESCRIPTION: ADAPTIVE EQUIPMENT;INCREASED TIME;SQUAT PIVOT TRANSFER TO WHEELCHAIR

## 2024-02-19 ASSESSMENT — GAIT ASSESSMENTS: GAIT LEVEL OF ASSIST: UNABLE TO PARTICIPATE

## 2024-02-19 NOTE — CARE PLAN
"The patient is Stable - Low risk of patient condition declining or worsening    Shift Goals  Clinical Goals: Safety  Patient Goals: Participate in therapy    Progress made toward(s) clinical / shift goals:      Problem: Fall Risk - Rehab  Goal: Patient will remain free from falls  Outcome: Progressing  Note: Florecita Roldan Fall risk Assessment Score: 22    High fall risk Interventions   - Alarming seatbelt  - Bed and strip alarm   - Yellow sign by the door   - Yellow wrist band \"Fall risk\"  - Room near to the nurse station  - Do not leave patient unattended in the bathroom  - Fall risk education provided    Pt calls appropriately when in need of assistance.     Problem: Pain - Standard  Goal: Alleviation of pain or a reduction in pain to the patient’s comfort goal  Outcome: Progressing  Note: Pt denies pain or discomfort tonight. Sleeps good. Will continue to monitor.       Patient is not progressing towards the following goals:      "

## 2024-02-19 NOTE — CARE PLAN
"  Problem: Fall Risk - Rehab  Goal: Patient will remain free from falls  Note: Problem: Fall Risk - Rehab  Goal: Patient will remain free from falls  Note: Florecita Roldan Fall risk Assessment Score: 22     High fall risk Interventions   - Bed and strip alarm   - Yellow sign by the door   - Yellow wrist band \"Fall risk\"  - Room near to the nurse station  - Do not leave patient unattended in the bathroom  - Fall risk education provided   The patient is Stable - Low risk of patient condition declining or worsening    Shift Goals  Clinical Goals: Safety  Patient Goals: Participate in therapy  "

## 2024-02-19 NOTE — THERAPY
Physical Therapy   Daily Treatment     Patient Name: Beni Moore  Age:  53 y.o., Sex:  male  Medical Record #: [id number]  Today's Date: 2/19/2024     Precautions  Precautions: Fall Risk, Non Weight Bearing Left Lower Extremity, Immobilizer Left Lower Extremity  Comments: L BKA, WBAT on L UE s/p ORIF    Subjective    Pt up in wc, ready for PT     Objective       02/19/24 1231   PT Charge Group   PT Therapeutic Exercise (Units) 2   PT Total Time Spent   PT Individual Total Time Spent (Mins) 30   Gait Functional Level of Assist    Gait Level Of Assist Unable to Participate   Wheelchair Functional Level of Assist   Wheelchair Assist Modified Independent   Distance Wheelchair (Feet or Distance) 150+   Wheelchair Description Extra time   Stairs Functional Level of Assist   Level of Assist with Stairs Unable to Participate   Transfer Functional Level of Assist   Bed, Chair, Wheelchair Transfer Modified Independent   Bed Chair Wheelchair Transfer Description Adaptive equipment;Increased time;Squat pivot transfer to wheelchair   Supine Lower Body Exercise   Bridges Two Legged;1 set of 10  (L BKA on bolster)   Hip Abduction Side Lying;1 set of 10;Left   Hip Adduction  1 set of 10  (ADRIANA squeeze)   Straight Leg Raises 1 set of 10;Left   Knee to Chest 1 set of 10   Gluteal Isometrics 1 set of 10;Left   Quadriceps Isometrics 1 set of 10;Left   Other Exercises prone lying hip ext/ hamstring curls x 10 BLE's.   Sitting Lower Body Exercises   Long Arc Quad 1 set of 10   Hamstring Curl 1 set of 10   Bed Mobility    Supine to Sit Modified Independent   Sit to Supine Modified Independent   Sit to Stand Standby Assist   Scooting Modified Independent   Rolling Modified Independent         Assessment    Pt able to complete LE mat program with handouts and set-up, demonstrated good form and knowledge for strength/ ROM and stretching. Pt demonstrated safety with  level modified independence in room.    Strengths: Able to follow  instructions, Alert and oriented, Effective communication skills, Good carryover of learning, Good insight into deficits/needs, Independent prior level of function, Motivated for self care and independence, Pleasant and cooperative, Supportive family, Willingly participates in therapeutic activities  Barriers: Fatigue, Generalized weakness, Impaired activity tolerance, Impaired balance, Pain    Plan    Continue with mat program, emphasize prone positioning for stretching, AAROM/ PROM and stretching to L shoulder, transfer training from various heights, car transfer, WC mobility outdoors, standing with FWW. Pt is cleared for WBAT LUE       DME  PT DME Recommendations  Wheelchair:  (Pt already has his own WC and slideboard)  Assistive Device: Front Wheeled Walker    Passport items to be completed:  Get in/out of bed safely, in/out of a vehicle, safely use mobility device, walk or wheel around home/community, navigate up and down stairs, show how to get up/down from the ground, ensure home is accessible, demonstrate HEP, complete caregiver training    Physical Therapy Problems (Active)       Problem: Balance       Dates: Start:  02/09/24         Goal: STG-Within one week, patient will maintain static standing in parallel bars Gina with single UE support for 10 seconds in order to facilitate completion of standing ADLs       Dates: Start:  02/09/24               Problem: Mobility       Dates: Start:  02/09/24         Goal: STG-Within one week, patient will propel wheelchair community distances of at least 150' independently       Dates: Start:  02/09/24               Problem: Mobility Transfers       Dates: Start:  02/09/24         Goal: STG-Within one week, patient will sit to stand from WC Gina with UE pushing from WC        Dates: Start:  02/09/24               Problem: PT-Long Term Goals       Dates: Start:  02/09/24         Goal: LTG-By discharge, patient will tolerate standing with intermittent UE support for at  least 1 minute CGA in order to facilitate standing ADLs        Dates: Start:  02/09/24            Goal: LTG-By discharge, patient will perform squat pivot transfer from one surface to another SPV       Dates: Start:  02/09/24            Goal: LTG-By discharge, patient will perform home exercise program independently        Dates: Start:  02/09/24            Goal: LTG-By discharge, patient will transfer in/out of a car using LRAD or no AD CGA       Dates: Start:  02/09/24

## 2024-02-19 NOTE — THERAPY
Physical Therapy   Daily Treatment     Patient Name: Beni Moore  Age:  53 y.o., Sex:  male  Medical Record #: [id number]  Today's Date: 2/19/2024     Precautions  Precautions: Fall Risk, Non Weight Bearing Left Lower Extremity, Immobilizer Left Lower Extremity  Comments: L BKA, WBAT on L UE s/p ORIF    Subjective    Pt up in wc, ready for PT     Objective       02/19/24 1101   PT Charge Group   PT Therapeutic Activities (Units) 2   Wheelchair Functional Level of Assist   Wheelchair Assist Modified Independent   Distance Wheelchair (Feet or Distance) 150+   Wheelchair Description Extra time     Pt completed sit<>stand-pivot transfer with FWW wc<>[employer] SUV with min A steadying throughout pivot.  Pt completed slide board transfer wc<>[employer] with SBA/ verbal cues x 2 trials.     Assessment    Pt able to complete level surface car transfer with SPV/ set-up only, requires min A for steadying with stand-pivot transfer with FWW. Pt reports his parents have a [employer] Outback and is not concerned with car transfer after [facility]rice to [employer].    Strengths: Able to follow instructions, Alert and oriented, Effective communication skills, Good carryover of learning, Good insight into deficits/needs, Independent prior level of function, Motivated for self care and independence, Pleasant and cooperative, Supportive family, Willingly participates in therapeutic activities  Barriers: Fatigue, Generalized weakness, Impaired activity tolerance, Impaired balance, Pain    Plan      Continue with mat program, emphasize prone positioning for stretching, AAROM/ PROM and stretching to L shoulder, transfer training from various heights, car transfer, WC mobility outdoors, standing with FWW. Pt is cleared for WBAT LUE      DME  PT DME Recommendations  Wheelchair:  (Pt already has his own WC and slideboard)  Assistive Device: Front Wheeled Walker    Passport items to be completed:  Get in/out of bed safely, in/out of a vehicle, safely  use mobility device, walk or wheel around home/community, navigate up and down stairs, show how to get up/down from the ground, ensure home is accessible, demonstrate HEP, complete caregiver training    Physical Therapy Problems (Active)       Problem: Balance       Dates: Start:  02/09/24         Goal: STG-Within one week, patient will maintain static standing in parallel bars Gina with single UE support for 10 seconds in order to facilitate completion of standing ADLs       Dates: Start:  02/09/24               Problem: Mobility       Dates: Start:  02/09/24         Goal: STG-Within one week, patient will propel wheelchair community distances of at least 150' independently       Dates: Start:  02/09/24               Problem: Mobility Transfers       Dates: Start:  02/09/24         Goal: STG-Within one week, patient will sit to stand from WC Gina with UE pushing from WC        Dates: Start:  02/09/24               Problem: PT-Long Term Goals       Dates: Start:  02/09/24         Goal: LTG-By discharge, patient will tolerate standing with intermittent UE support for at least 1 minute CGA in order to facilitate standing ADLs        Dates: Start:  02/09/24            Goal: LTG-By discharge, patient will perform squat pivot transfer from one surface to another SPV       Dates: Start:  02/09/24            Goal: LTG-By discharge, patient will perform home exercise program independently        Dates: Start:  02/09/24            Goal: LTG-By discharge, patient will transfer in/out of a car using LRAD or no AD CGA       Dates: Start:  02/09/24

## 2024-02-19 NOTE — PROGRESS NOTES
Physical Medicine & Rehabilitation Progress Note    Encounter Date: 2/19/2024    Chief Complaint: Feels well    Interval Events (Subjective):  BP elevated otherwise VSS  Voiding    Patient seen and examined in his room about to work with therapy. He is feeling well. He has not received foot orthosis. He feels like a lot of fluid has been taken off.       ROS: 14 point ROS negative unless otherwise specified in the HPI    Objective:  VITAL SIGNS: BP (!) 147/70   Pulse 79   Temp 36.6 °C (97.9 °F) (Oral)   Resp 18   Ht 1.829 m (6')   Wt 102 kg (225 lb)   SpO2 97%   BMI 30.52 kg/m²     GEN: No apparent distress  HEENT: Head normocephalic, atraumatic.  Sclera nonicteric bilaterally, no ocular discharge appreciated bilaterally.  CV: Extremities warm and well-perfused, edematous extremities.  PULMONARY: Breathing nonlabored on room air, no respiratory accessory muscle use.  Not requiring supplemental oxygen.  ABD: Soft, nontender.  SKIN: No appreciable skin breakdown on exposed areas of skin.  PSYCH: Mood and affect within normal limits.  NEURO: Awake alert.  Conversational.  Logical thought content.  MSK: Clinical picture from 2/12      Clinical picture 2/15      Laboratory Values:  Recent Results (from the past 72 hour(s))   POCT glucose device results    Collection Time: 02/16/24 11:34 AM   Result Value Ref Range    POC Glucose, Blood 150 (H) 65 - 99 mg/dL   POCT glucose device results    Collection Time: 02/16/24  5:42 PM   Result Value Ref Range    POC Glucose, Blood 182 (H) 65 - 99 mg/dL   POCT glucose device results    Collection Time: 02/16/24  8:37 PM   Result Value Ref Range    POC Glucose, Blood 200 (H) 65 - 99 mg/dL   Basic Metabolic Panel    Collection Time: 02/17/24  5:50 AM   Result Value Ref Range    Sodium 135 135 - 145 mmol/L    Potassium 4.7 3.6 - 5.5 mmol/L    Chloride 105 96 - 112 mmol/L    Co2 19 (L) 20 - 33 mmol/L    Glucose 91 65 - 99 mg/dL    Bun 55 (H) 8 - 22 mg/dL    Creatinine 2.97 (H)  0.50 - 1.40 mg/dL    Calcium 7.8 (L) 8.5 - 10.5 mg/dL    Anion Gap 11.0 7.0 - 16.0   CBC WITHOUT DIFFERENTIAL    Collection Time: 02/17/24  5:50 AM   Result Value Ref Range    WBC 4.9 4.8 - 10.8 K/uL    RBC 2.91 (L) 4.70 - 6.10 M/uL    Hemoglobin 8.5 (L) 14.0 - 18.0 g/dL    Hematocrit 26.3 (L) 42.0 - 52.0 %    MCV 90.4 81.4 - 97.8 fL    MCH 29.2 27.0 - 33.0 pg    MCHC 32.3 32.3 - 36.5 g/dL    RDW 49.9 35.9 - 50.0 fL    Platelet Count 267 164 - 446 K/uL    MPV 9.8 9.0 - 12.9 fL   proBrain Natriuretic Peptide, NT    Collection Time: 02/17/24  5:50 AM   Result Value Ref Range    NT-proBNP 6826 (H) 0 - 125 pg/mL   PHOSPHORUS    Collection Time: 02/17/24  5:50 AM   Result Value Ref Range    Phosphorus 3.1 2.5 - 4.5 mg/dL   ESTIMATED GFR    Collection Time: 02/17/24  5:50 AM   Result Value Ref Range    GFR (CKD-EPI) 24 (A) >60 mL/min/1.73 m 2   POCT glucose device results    Collection Time: 02/17/24  7:58 AM   Result Value Ref Range    POC Glucose, Blood 93 65 - 99 mg/dL   POCT glucose device results    Collection Time: 02/17/24 11:52 AM   Result Value Ref Range    POC Glucose, Blood 119 (H) 65 - 99 mg/dL   POCT glucose device results    Collection Time: 02/17/24  5:04 PM   Result Value Ref Range    POC Glucose, Blood 200 (H) 65 - 99 mg/dL   POCT glucose device results    Collection Time: 02/17/24  8:23 PM   Result Value Ref Range    POC Glucose, Blood 159 (H) 65 - 99 mg/dL   Basic Metabolic Panel    Collection Time: 02/18/24  5:45 AM   Result Value Ref Range    Sodium 134 (L) 135 - 145 mmol/L    Potassium 4.8 3.6 - 5.5 mmol/L    Chloride 105 96 - 112 mmol/L    Co2 19 (L) 20 - 33 mmol/L    Glucose 146 (H) 65 - 99 mg/dL    Bun 57 (H) 8 - 22 mg/dL    Creatinine 3.15 (H) 0.50 - 1.40 mg/dL    Calcium 7.6 (L) 8.5 - 10.5 mg/dL    Anion Gap 10.0 7.0 - 16.0   PHOSPHORUS    Collection Time: 02/18/24  5:45 AM   Result Value Ref Range    Phosphorus 3.2 2.5 - 4.5 mg/dL   proBrain Natriuretic Peptide, NT    Collection Time: 02/18/24   5:45 AM   Result Value Ref Range    NT-proBNP 5638 (H) 0 - 125 pg/mL   ESTIMATED GFR    Collection Time: 02/18/24  5:45 AM   Result Value Ref Range    GFR (CKD-EPI) 22 (A) >60 mL/min/1.73 m 2   POCT glucose device results    Collection Time: 02/18/24  7:47 AM   Result Value Ref Range    POC Glucose, Blood 134 (H) 65 - 99 mg/dL   POCT glucose device results    Collection Time: 02/18/24 10:55 AM   Result Value Ref Range    POC Glucose, Blood 158 (H) 65 - 99 mg/dL   POCT glucose device results    Collection Time: 02/18/24  5:25 PM   Result Value Ref Range    POC Glucose, Blood 152 (H) 65 - 99 mg/dL   POCT glucose device results    Collection Time: 02/18/24  8:34 PM   Result Value Ref Range    POC Glucose, Blood 147 (H) 65 - 99 mg/dL   Basic Metabolic Panel    Collection Time: 02/19/24  5:59 AM   Result Value Ref Range    Sodium 134 (L) 135 - 145 mmol/L    Potassium 4.6 3.6 - 5.5 mmol/L    Chloride 106 96 - 112 mmol/L    Co2 19 (L) 20 - 33 mmol/L    Glucose 163 (H) 65 - 99 mg/dL    Bun 60 (H) 8 - 22 mg/dL    Creatinine 3.05 (H) 0.50 - 1.40 mg/dL    Calcium 7.6 (L) 8.5 - 10.5 mg/dL    Anion Gap 9.0 7.0 - 16.0   CBC WITHOUT DIFFERENTIAL    Collection Time: 02/19/24  5:59 AM   Result Value Ref Range    WBC 5.4 4.8 - 10.8 K/uL    RBC 2.63 (L) 4.70 - 6.10 M/uL    Hemoglobin 7.7 (L) 14.0 - 18.0 g/dL    Hematocrit 23.6 (L) 42.0 - 52.0 %    MCV 89.7 81.4 - 97.8 fL    MCH 29.3 27.0 - 33.0 pg    MCHC 32.6 32.3 - 36.5 g/dL    RDW 49.8 35.9 - 50.0 fL    Platelet Count 232 164 - 446 K/uL    MPV 10.0 9.0 - 12.9 fL   ESTIMATED GFR    Collection Time: 02/19/24  5:59 AM   Result Value Ref Range    GFR (CKD-EPI) 23 (A) >60 mL/min/1.73 m 2   POCT glucose device results    Collection Time: 02/19/24  7:23 AM   Result Value Ref Range    POC Glucose, Blood 155 (H) 65 - 99 mg/dL       Medications:  Scheduled Medications   Medication Dose Frequency    furosemide  40 mg BID DIURETIC    albumin human 25%  12.5 g BID DIURETIC    amLODIPine  5 mg Q  DAY    cholestyramine  1 Packet BID    therapeutic multivitamin-minerals  1 Tablet DAILY WITH LUNCH    zinc sulfate  220 mg DAILY WITH LUNCH    ascorbic acid  500 mg BID WITH MEALS    aspirin  81 mg DAILY    atorvastatin  80 mg Q EVENING    calcium carbonate  500 mg BID WITH MEALS    epoetin  5,000 Units MO, WE + FR    heparin  5,000 Units Q8HRS    insulin GLARGINE  15 Units Q EVENING    insulin regular  3-14 Units 4X/DAY ACHS    omeprazole  20 mg QAM    Pharmacy Consult Request  1 Each PHARMACY TO DOSE     PRN medications: senna-docusate **AND** polyethylene glycol/lytes **AND** bisacodyl, Respiratory Therapy Consult, hydrALAZINE, carboxymethylcellulose, benzocaine-menthol, mag hydrox-al hydrox-simeth, ondansetron **OR** ondansetron, traZODone, sodium chloride, insulin regular **AND** POC blood glucose manual result **AND** NOTIFY MD and PharmD **AND** Administer 20 grams of glucose (approximately 8 ounces of fruit juice) every 15 minutes PRN FSBG less than 70 mg/dL **AND** dextrose bolus, acetaminophen, heparin, lidocaine PF, oxyCODONE immediate-release **OR** oxyCODONE immediate-release    Diet:  Current Diet Order   Procedures    Diet Order Diet: Consistent CHO (Diabetic); Second Modifier: (optional): Renal       Medical Decision Making and Plan:  Left BKA secondary to acute on chronic osteomyelitis in setting of diabetes mellitus  Osteomyelitis resolved with infection control with BKA and s/p IV antibiotics  PT and OT for mobility and ADLs. Per guidelines, 15 hours per week between PT, OT and/or SLP.  Follow-up orthopedics    2/9 erythematous residual limb on admission.  WBC is reassuring and is normal. Despite erythema, not with any excess warmth compared to rest of residual limb. No purulent. No clinical concern for infection at this time. Possible due to gravity dependency when up with therapy and pressure from wrapping. Continue to monitor closely.     2/13 Clinicaly picture 2/12 with good shape an  non-concerning exam.     2/15 Healing well. No c/f distal residual limb     Acute blood loss anemia  Postoperative and CKD, required 1 unit PRBC  Hemoglobin 2/9 is improved at 9.4 --> 8.8 2/12.    Nephrology following  2/19 7.7     Acute renal failure stage IIIb CKD on HD  S/p right IJ tunneled cath placement 1/29/2024  Hemodialysis Monday, Wednesday, Friday  Fluid positive  2/9 place compression stocking right lower extremity  2/9 discussed with nephrology regarding use of Lasix, initiated.   2/12 HD on HOLD. Hoping for renal recovery.   2/19 Continue HD hold.  Renal function stable  Nephrology following     Type 2 diabetes mellitus with hyperglycemia, insulin-dependent  Long-term complications causing CKD, peripheral neuropathy, neurogenic bowel  Continue long-acting and short acting insulin  At home had been on Lantus 36 at night with SSI     Hypocalcemia  Continue supplementing 2/12    Hyperphosphatemia  Resolved on admission 2.7    Hyponatremia  Patient has specific diet for his GI system  On dialysis  Nephrology following    Elevated TSH  Normal free T4  Continue to monitor     History of CAD s/p ARTURO 9/22/2019  Last cardiology note 7/7/2023: On atorvastatin 80 mg, aspirin 81 mg, carvedilol 3.125 mg twice daily, Jardiance 25 mg daily  Continue aspirin 81 mg daily and statin 80 mg nightly     Hypertension  Last cardiology note 7/7/2023: Previously had been on carvedilol 3.125 mg twice daily and enalapril 10 mg twice daily  2/9 blood pressures running in the 130s-150s. Hold on BP medication initiation, potential diuresis.   2/12 blood pressure elevated.  He is on Lasix 60 mg twice daily. D/w nephrology start amlodipine 5mg daily. Hold on BB due to HR, hold on ACE-I due to hopefully return of renal function. Will need to start reducing Lasix.   2/13 BP better controlled. Increase Lasix to 80mg daily - cleared with nephrology.   2/19 's-150's, on Lasix 40mg BID 2/18. Increase Norvasc 7.5 mg daily.       Pain  Currently not reporting pain  As needed Tylenol  As needed oxycodone     Skin  Patient at risk for skin breakdown due to debility in areas including sacrum, achilles, elbows and head in addition to other sites. Nursing to assess skin daily.      GI Ppx  Patient on Prilosec for GERD prophylaxis.      Bowel   Neurogenic bowel secondary to long-term complications from diabetes  Colestyramine at 10 AM and 5 PM  Follows with GI  Patient on Senna-docusate for constipation prophylaxis.   Abnormal frequency of bowel movements at baseline.  Can go 5 to 7 days.     Bladder  TV/PVR/BS PRN  PVRs in 200's, likely positional. Monitor    Left Humeral Fracture 12/2023 s/p ORIF 12/11/23 Dr. Aris Pierre  D/w Dr. Pierre 2/14/2024 and is WBAT with ROM as tolerated L shoulder/arm     Upcoming Labs/imaging: Daily BMP per Nephro to follow renal function off of dialysis     DVT PROPHYLAXIS: Heparin 5000 units q8 hrs     HOSPITALIST FOLLOWING: No. Nephrology following      CODE STATUS: FULL CODE     DISPO: Home with supportive parents.      LISE: 2/22/24     ADDITIONAL MEDICAL NEEDS ON D/C (IV abx, O2, etc): TBD     MEDS SENT TO: M2B Eligible.     DISCHARGE SPECIALIST FOLLOW UP: Orthopedics (ROCK Segura)     Patient to scheduled follow up with their PCP within 2 weeks from discharge from the [facility].      ____________________________________    Dr. Mena Ny DO, MS  [facility] - Physical Medicine & Rehabilitation   ____________________________________

## 2024-02-19 NOTE — PROGRESS NOTES
NURSING DAILY NOTE    Name: Beni Moore   Date of Admission: 2/8/2024   Admitting Diagnosis: Hx of BKA, left (HCC)  Attending Physician: Mena Ny D.o.  Allergies: Patient has no known allergies.    Safety  Patient Assist  min a  Patient Precautions  Fall Risk, Non Weight Bearing Left Lower Extremity, Immobilizer Left Lower Extremity  Precaution Comments  L BKA, WBAT on L UE s/p ORIF  Bed Transfer Status  Standby Assist (lateral scoot)  Toilet Transfer Status   Standby Assist (w/c <> bariatric drop arm commode lateral scoot)  Assistive Devices  Rails  Oxygen  None - Room Air  Diet/Therapeutic Dining  Current Diet Order   Procedures    Diet Order Diet: Consistent CHO (Diabetic); Second Modifier: (optional): Renal     Pill Administration  whole  Agitated Behavioral Scale     ABS Level of Severity       Fall Risk  Has the patient had a fall this admission?   No  Florecita Roldan Fall Risk Scoring  22, HIGH RISK  Fall Risk Safety Measures  bed alarm and chair alarm    Vitals  Temperature: 37 °C (98.6 °F)  Temp src: Oral  Pulse: 82  Respiration: 17  Blood Pressure: (!) 152/84  Blood Pressure MAP (Calculated): 107 MM HG  BP Location: Left, Upper Arm  Patient BP Position: Supine     Oxygen  Pulse Oximetry: 93 %  O2 (LPM): 0  O2 Delivery Device: None - Room Air    Bowel and Bladder  Last Bowel Movement  02/18/24  Stool Type  Type 3: Like a sausage, but with cracks on its surface  Bowel Device  Bathroom  Continent  Bladder: Continent void   Bowel: Continent movement  Bladder Function  Urine Void (mL): 600 ml  Number of Times Voided: 1  Urine Color: Yellow  Genitourinary Assessment   Bladder Assessment (WDL):  Within Defined Limits  Farrell Catheter: Not Applicable  Urinary Elimination:  (able to void)  Urine Color: Yellow  Bladder Device: Urinal  Time Void: Yes  Bladder Scan: Post Void  $ Bladder Scan Results (mL): 78    Skin  Johnny Score   18  Sensory  Interventions   Bed Types: Standard/Trauma Mattress  Skin Preventative Measures: Pillows in Use for Support / Positioning  Moisture Interventions  Moisturizers/Barriers: Barrier Wipes      Pain  Pain Rating Scale  0 - No Pain  Pain Location  Shoulder  Pain Location Orientation  Left  Pain Interventions   Declines    ADLs    Bathing   Shower, Staff (pt shower by ot)  Linen Change   Partial  Personal Hygiene     Chlorhexidine Bath      Oral Care  Brushed Teeth (self)  Teeth/Dentures     Shave     Nutrition Percentage Eaten  *  * Meal *  *, Breakfast, Between % Consumed  Environmental Precautions  Treaded Slipper Socks on Patient, Bed in Low Position  Patient Turns/Positioning  Patient Turns Self from Side to Side  Patient Turns Assistance/Tolerance  Assistance of One  Bed Positions  Bed Controls On, Bed Locked  Head of Bed Elevated  Self regulated      Psychosocial/Neurologic Assessment  Psychosocial Assessment  Psychosocial (WDL):  Within Defined Limits  Neurologic Assessment  Neuro (WDL): Exceptions to WDL  Level of Consciousness: Alert  Orientation Level: Oriented X4  Cognition: Follows commands, Appropriate attention/concentration  Speech: Clear  Muscle Strength Right Arm: Good Strength Against Gravity and Moderate Resistance  Muscle Strength Left Arm: Good Strength Against Gravity and Moderate Resistance  Muscle Strength Right Leg: Fair Strength against Gravity but No Resistance  Muscle Strength Left Leg:  (BKA)  EENT (WDL):  WDL Except    Cardio/Pulmonary Assessment  Edema   LLE Edema: 1+  Respiratory Breath Sounds  RUL Breath Sounds: Clear  RML Breath Sounds: Clear  RLL Breath Sounds: Clear  CHRIS Breath Sounds: Clear  LLL Breath Sounds: Clear  Cardiac Assessment   Cardiac (WDL):  WDL Except (hx of htn)

## 2024-02-19 NOTE — THERAPY
Occupational Therapy  Daily Treatment     Patient Name: Beni Moore  Age:  53 y.o., Sex:  male  Medical Record #: [id number]  Today's Date: 2/19/2024     Precautions  Precautions: Fall Risk, Non Weight Bearing Left Lower Extremity, Immobilizer Left Lower Extremity  Comments: L BKA, WBAT on L UE s/p ORIF         Subjective  Patient retrieved from w/c in room, pleasant and agreeable to OT session.      Objective     02/19/24 0701   OT Charge Group   OT Therapeutic Exercise (Units) 4   OT Total Time Spent   OT Individual Total Time Spent (Mins) 60   Interdisciplinary Plan of Care Collaboration   IDT Collaboration with  Nursing   Patient Position at End of Therapy Seated  (in dining room for breakfast)   Collaboration Comments RN for glucose check     Pt completed UE isometric exercises with no c/o pain or discomfort:  Shoulder flexion  Shoulder extension  Internal rotation  External rotation  Elbow extension  Elbow flexion  Shoulder abduction  TENS applied to shoulder during exercises to address pain, pt reported relief and states that he would like to purchase a TENS unit for use at home.       Pt completed x5 STS in // bars. He uses the w/c arm rests for initial assist with STS versus // bars or FWW. Then completed 8 minutes static standing with min UE support on // bars. Activity terminated to progress to UE exercises, pt demonstrated ability to continue standing effort.     Assessment  Patient had good tolerance to session with improvements in standing tolerance and good response to UE isometrics.   Strengths: Able to follow instructions, Alert and oriented, Effective communication skills, Good endurance, Good insight into deficits/needs, Independent prior level of function, Motivated for self care and independence, Pleasant and cooperative, Supportive family, Willingly participates in therapeutic activities  Barriers: Decreased endurance, Fatigue, Generalized weakness, Impaired activity tolerance, Impaired  balance, Limited mobility    Plan  LUE AROM/PROM with TENS PRN  UE strengthening (with focus on isometrics to LUE)  Fxl txfrs  Standing tolerance    DME  OT DME Recommendations  Bathroom Equipment:  (Pt has acquired necessary DME for bathroom to support safety and independence with ADLs.)    Passport items to be completed:  Perform bathroom transfers, complete dressing, complete feeding, get ready for the day, prepare a simple meal, participate in household tasks, adapt home for safety needs, demonstrate home exercise program, complete caregiver training     Occupational Therapy Goals (Active)       Problem: Bathing       Dates: Start:  02/09/24         Goal: STG-Within one week, patient will bathe w/ CGA and AE/DME as needed       Dates: Start:  02/09/24               Problem: Dressing       Dates: Start:  02/09/24         Goal: STG-Within one week, patient will dress LB w/ min A and AE as needed       Dates: Start:  02/09/24               Problem: IADL's       Dates: Start:  02/09/24         Goal: STG-Within one week, patient will access kitchen area w/ min A and LRAD       Dates: Start:  02/09/24               Problem: OT Long Term Goals       Dates: Start:  02/09/24         Goal: LTG-By discharge, patient will complete basic self care tasks w/ mod I/supervision and AE/DME as needed       Dates: Start:  02/09/24            Goal: LTG-By discharge, patient will complete basic home management w/ supervision and LRAD       Dates: Start:  02/09/24               Problem: Toileting       Dates: Start:  02/09/24         Goal: STG-Within one week, patient will complete toileting tasks w/ min A and AE/DME as needed       Dates: Start:  02/09/24

## 2024-02-19 NOTE — THERAPY
Recreational Therapy   Initial Evaluation     Patient Name: Beni Moore  Age:  53 y.o., Sex:  male  Medical Record #: [id number]  Today's Date: 2/19/2024     Subjective    Patient was willing to meet and discuss current and future leisure plans.     Objective       02/19/24 1031   Procedural Tracking   Procedural Tracking Leisure Skills Awareness   Treatment Time   Total Time Spent (mins) 30   Leisure History   Leisure Interests   (Hunting, fishing, side by side, golf.)   Leisure Comments   (Prefers solo leisure and wants to wait for prosthetic and then return to previous leiusre)   Pre-Morbid Leisure Lifestyle Individual;Moderately Active   Prior Living Arrangements   Lives with - Patient's Self Care Capacity Alone and Able to Care For Self   Steps Into Home 1   Steps In Home   (Already has plans for 7 foot ramp into garage.)   Ambulation Independent   Assistive Devices Used None   Driving / Transportation Driving Independent   Functional Ability Status - Physical   Endurance Good    Right  Strong   Left  Strong   Right Arm Strong   Left Arm Strong   Right Leg Weak  (Toe Ambutation)   Left Leg None  (LLE BKA)   Upper Extremity Gross Motor Uses Both Arms / Hands   Lower Extremity Gross Motor   (LLE BKA, limited function on right due to toe ambutation)   Fine Motor Manipulates Small Objects   Functional Ability Status - Cognitive   Attention Span Remains on Task   Comprehension Follows Three Step Commands   Judgment Able to Make Independent Decisions   Functional Ability Status - Emotional    Affect Appropriate   Mood Appropriate   Behavior Appropriate   Leisure Competence Measure   Leisure Awareness Independent   Leisure Attitude Independent   Cultural / Social Behaviors Independent   Interpersonal Skills Independent   Social Contact Independent   Clinical Impression   Clinical Impression Impaired Gross Motor Leisure Functioning   Current Discharge Plan   Current Discharge Plan Temporarily go to Family /   Friend's Home   Benefit    Benefit Patient would Benefit from Inpatient Recreational Therapy to Maximize Independent Leisure Functioning    Interdisciplinary Plan of Care Collaboration   IDT Collaboration with  Occupational Therapist;Nursing   Patient Position at End of Therapy Seated;Call Light within Reach   Collaboration Comments Received care from OT, Nursing vitatls   Strengths & Barriers   Strengths Able to follow instructions;Effective communication skills;Pleasant and cooperative;Willingly participates in therapeutic activities;Supportive family;Motivated for self care and independence   Barriers Impaired balance         Assessment  Patient is 53 y.o. male with a diagnosis of Hx of BKA, left .  Additional factors influencing patient status / progress (ie: cognitive factors, co-morbidities, social support, etc): History significant for chronic kidney disease due to diabetic nephropathy, chronic thrombotic microangiopathy, insulin-dependent diabetes mellitus, peripheral neuropathy, neurogenic bowel secondary to diabetes who presented to [facility] on 1/25/2024 with infection of the left foot.  X-ray revealed pathologic fracture and osteomyelitis with gas in the soft tissues.  White count was significantly elevated in the 20s.  His creatinine was elevated in the sixes and he was hypotensive.  He was placed on IV antibiotics.  Orthopedics and nephrology were consulted.  He ultimately underwent left BKA on 2/1/2024 by Dr. Mcmanus and for source control.  He is no longer on antibiotics.  A tunneled dialysis catheter was placed and he was started on hemodialysis 1/31/2024.  He did have acute blood loss anemia in the setting of chronic kidney disease and recent operation and required 1 unit packed red blood cell.  Dialysis is on Mondays Wednesdays and Fridays.     Patient reports that many of the things he enjoys such as hunting and fishing he will not be able to return to until he receives his  "prosthesis. He does realize that he will need to try find new things to participate in the interim. He reported excitement about the prospect of participating in virtual golf with his family, while he is staying with them. Patient discussed his concerns about participating in prior leisure and returning to his home. One activity he is planning on returning to post discharge, is sewing outdoor gear.He already has plans for modifications for driving and home. Introduced and discussed the support [facility] can provide. At the end of the session, patient was sharing about his adult children and became tearful when sharing that one of his daughters passed away last year due to COVID complications. Patient stated that he \"needs to do everything for myself\" and reported that he enjoys solo leisure mostly.        Plan  Recommend Recreational Therapy 30-60 minutes per day  2-3  days per week for 3 weeks  for the following treatments:  Community Re-Integration, Community Skills Development, Leisure Skills Awareness, Leisure Skills Development, and Gross Motor Functional Leisure Skills    Passport items to be completed:  Verbalize two positive leisure activities, discuss returning to work, hobbies, community groups or volunteer activities, explore community resources     Patient reports willingness to work on standing balance, once he receives his orthotic and shoe.     Goals:  Long term and short term goals have been discussed with patient and they are in agreement.    Recreation Therapy Problems (Active)       Problem: Recreation Therapy       Dates: Start:  02/19/24         Goal: STG-Within one week, patient will demonstrate leisure problem solving by sharing on two positive lesiure activities they would like to participate in session and any perceived barriers to their participation.        Dates: Start:  02/19/24            Goal: LTG-By discharge, patient will demonstrate leisure problem solving by sharing on two " positive lesiure activities they would like to participate post discharge and any perceived barriers to their participation.        Dates: Start:  02/19/24

## 2024-02-19 NOTE — THERAPY
"Occupational Therapy  Daily Treatment     Patient Name: Beni Moore  Age:  53 y.o., Sex:  male  Medical Record #: [id number]  Today's Date: 2/19/2024     Precautions  Precautions: Fall Risk, Non Weight Bearing Left Lower Extremity, Immobilizer Left Lower Extremity  Comments: L BKA, WBAT on L UE s/p ORIF         Subjective    \"I really want to work on standing.\"      Objective       02/19/24 0931   OT Charge Group   OT Therapy Activity (Units) 2   OT Therapeutic Exercise (Units) 2   OT Total Time Spent   OT Individual Total Time Spent (Mins) 60   Pain   Intervention Declines   Cognition    Level of Consciousness Alert   Interdisciplinary Plan of Care Collaboration   Patient Position at End of Therapy Seated;Call Light within Reach;Tray Table within Reach;Phone within Reach;Other (Comments)  (handoff to Rec therapy in room)     Educated pt on AAROM seated in w/c:  - pulley's for increased shoulder flexion   - SPC to assist LUE into shoulder flexion, shoulder abduction, horizontal ab/adduction, shoulder extension     STS's in room using FWW:  - 3 STS's with CGA-SBA  - pt tolerated standing x 2-3 minutes each trial with good control for STS's and stand to sit  -w/c pushed up against wall during STS's to prevent pushing back on     Sensory Integration and phantom limb pain:   - educated pt on massage, tapping, vibration on stump to desensitize stump and assist with pain mgmt    Assessment    Pt tolerated session well. Educated pt on AAROM using SPC- pt reported having a cane at home. Noted improvement in AROM after stretching. Pt completed 3 STS's in room to FWW with CGA-SBA. Pt then completed own wound care and changing bandages for stump and reapplying gauze and ace wrap while seated in w/c. Pt very knowledgeable about how to complete own wound care and wrappings.     Strengths: Able to follow instructions, Alert and oriented, Effective communication skills, Good endurance, Good insight into deficits/needs, " Independent prior level of function, Motivated for self care and independence, Pleasant and cooperative, Supportive family, Willingly participates in therapeutic activities  Barriers: Decreased endurance, Fatigue, Generalized weakness, Impaired activity tolerance, Impaired balance, Limited mobility    Plan    LUE AROM/PROM with TENS PRN  UE strengthening (with focus on isometrics to LUE)  Fxl txfrs  Standing tolerance     DME  OT DME Recommendations  Bathroom Equipment:  (Pt has acquired necessary DME for bathroom to support safety and independence with ADLs.)     Passport items to be completed:  Perform bathroom transfers, complete dressing, complete feeding, get ready for the day, prepare a simple meal, participate in household tasks, adapt home for safety needs, demonstrate home exercise program, complete caregiver training     Occupational Therapy Goals (Active)       Problem: Bathing       Dates: Start:  02/09/24         Goal: STG-Within one week, patient will bathe w/ CGA and AE/DME as needed       Dates: Start:  02/09/24               Problem: Dressing       Dates: Start:  02/09/24         Goal: STG-Within one week, patient will dress LB w/ min A and AE as needed       Dates: Start:  02/09/24               Problem: IADL's       Dates: Start:  02/09/24         Goal: STG-Within one week, patient will access kitchen area w/ min A and LRAD       Dates: Start:  02/09/24               Problem: OT Long Term Goals       Dates: Start:  02/09/24         Goal: LTG-By discharge, patient will complete basic self care tasks w/ mod I/supervision and AE/DME as needed       Dates: Start:  02/09/24            Goal: LTG-By discharge, patient will complete basic home management w/ supervision and LRAD       Dates: Start:  02/09/24               Problem: Toileting       Dates: Start:  02/09/24         Goal: STG-Within one week, patient will complete toileting tasks w/ min A and AE/DME as needed       Dates: Start:  02/09/24

## 2024-02-19 NOTE — CARE PLAN
Problem: Skin Integrity - Diabetes  Goal: Patient's skin on legs and feet will remain intact while hospitalized  Note: Left BKA incision was cleansed and new dressing was applied. Patient was able to wrapped the residual limb using dry gauze and Ace wrap. Picture was updated.      The patient is Stable - Low risk of patient condition declining or worsening    Shift Goals  Clinical Goals: Safety  Patient Goals: Participate in therapy

## 2024-02-19 NOTE — PROGRESS NOTES
NURSING DAILY NOTE    Name: Beni Moore   Date of Admission: 2/8/2024   Admitting Diagnosis: Hx of BKA, left (HCC)  Attending Physician: Mena Ny D.o.  Allergies: Patient has no known allergies.    Safety  Patient Assist  min a  Patient Precautions  Fall Risk, Non Weight Bearing Left Lower Extremity, Immobilizer Left Lower Extremity  Precaution Comments  L BKA, WBAT on L UE s/p ORIF  Bed Transfer Status  Standby Assist (lateral scoot)  Toilet Transfer Status   Standby Assist (w/c <> bariatric drop arm commode lateral scoot)  Assistive Devices  Rails  Oxygen  None - Room Air  Diet/Therapeutic Dining  Current Diet Order   Procedures    Diet Order Diet: Consistent CHO (Diabetic); Second Modifier: (optional): Renal     Pill Administration  whole  Agitated Behavioral Scale  17  ABS Level of Severity  No Agitation    Fall Risk  Has the patient had a fall this admission?   No  Florecita Roldan Fall Risk Scoring  22, HIGH RISK  Fall Risk Safety Measures  bed alarm, chair alarm, poor balance, and low vision/ hearing    Vitals  Temperature: 37 °C (98.6 °F)  Temp src: Oral  Pulse: 82  Respiration: 17  Blood Pressure: (Abnormal) 152/84  Blood Pressure MAP (Calculated): 107 MM HG  BP Location: Left, Upper Arm  Patient BP Position: Supine     Oxygen  Pulse Oximetry: 93 %  O2 (LPM): 0  O2 Delivery Device: None - Room Air    Bowel and Bladder  Last Bowel Movement  02/18/24  Stool Type  Type 3: Like a sausage, but with cracks on its surface  Bowel Device  Bathroom  Continent  Bladder: Continent void   Bowel: Continent movement  Bladder Function  Urine Void (mL): 450 ml  Number of Times Voided: 1  Urine Color: Yellow  Genitourinary Assessment   Bladder Assessment (WDL):  Within Defined Limits  Farrell Catheter: Not Applicable  Urinary Elimination:  (able to void)  Urine Color: Yellow  Bladder Device: Urinal  Time Void: Yes  Bladder Scan: Post Void  $ Bladder Scan  Results (mL): 78    Skin  Johnny Score   18  Sensory Interventions   Bed Types: Standard/Trauma Mattress  Skin Preventative Measures: Pillows in Use for Support / Positioning  Moisture Interventions  Moisturizers/Barriers: Barrier Wipes      Pain  Pain Rating Scale  0 - No Pain  Pain Location  Shoulder  Pain Location Orientation  Left  Pain Interventions   Declines    ADLs    Bathing   Shower, Staff (pt shower by ot)  Linen Change   Partial  Personal Hygiene     Chlorhexidine Bath      Oral Care  Brushed Teeth (self)  Teeth/Dentures     Shave     Nutrition Percentage Eaten  *  * Meal *  *, Breakfast, Between % Consumed  Environmental Precautions  Treaded Slipper Socks on Patient, Bed in Low Position  Patient Turns/Positioning  Patient Turns Self from Side to Side  Patient Turns Assistance/Tolerance  Assistance of One  Bed Positions  Bed Controls On, Bed Locked  Head of Bed Elevated  Self regulated      Psychosocial/Neurologic Assessment  Psychosocial Assessment  Psychosocial (WDL):  Within Defined Limits  Neurologic Assessment  Neuro (WDL): Exceptions to WDL  Level of Consciousness: Alert  Orientation Level: Oriented X4  Cognition: Follows commands, Appropriate attention/concentration  Speech: Clear  Muscle Strength Right Arm: Good Strength Against Gravity and Moderate Resistance  Muscle Strength Left Arm: Good Strength Against Gravity and Moderate Resistance  Muscle Strength Right Leg: Fair Strength against Gravity but No Resistance  Muscle Strength Left Leg:  (BKA)  EENT (WDL):  WDL Except    Cardio/Pulmonary Assessment  Edema   LLE Edema: 1+  Respiratory Breath Sounds  RUL Breath Sounds: Clear  RML Breath Sounds: Clear  RLL Breath Sounds: Clear  CHRIS Breath Sounds: Clear  LLL Breath Sounds: Clear  Cardiac Assessment   Cardiac (WDL):  WDL Except (hx of htn)

## 2024-02-20 ENCOUNTER — APPOINTMENT (OUTPATIENT)
Dept: OCCUPATIONAL THERAPY | Facility: REHABILITATION | Age: 54
DRG: 559 | End: 2024-02-20
Attending: PHYSICAL MEDICINE & REHABILITATION
Payer: MEDICARE

## 2024-02-20 ENCOUNTER — APPOINTMENT (OUTPATIENT)
Dept: PHYSICAL THERAPY | Facility: REHABILITATION | Age: 54
DRG: 559 | End: 2024-02-20
Attending: PHYSICAL MEDICINE & REHABILITATION
Payer: MEDICARE

## 2024-02-20 LAB
ANION GAP SERPL CALC-SCNC: 9 MMOL/L (ref 7–16)
BUN SERPL-MCNC: 61 MG/DL (ref 8–22)
CALCIUM SERPL-MCNC: 7.9 MG/DL (ref 8.5–10.5)
CHLORIDE SERPL-SCNC: 104 MMOL/L (ref 96–112)
CO2 SERPL-SCNC: 19 MMOL/L (ref 20–33)
CREAT SERPL-MCNC: 3.06 MG/DL (ref 0.5–1.4)
ERYTHROCYTE [DISTWIDTH] IN BLOOD BY AUTOMATED COUNT: 49.6 FL (ref 35.9–50)
GFR SERPLBLD CREATININE-BSD FMLA CKD-EPI: 23 ML/MIN/1.73 M 2
GLUCOSE BLD STRIP.AUTO-MCNC: 114 MG/DL (ref 65–99)
GLUCOSE BLD STRIP.AUTO-MCNC: 139 MG/DL (ref 65–99)
GLUCOSE BLD STRIP.AUTO-MCNC: 198 MG/DL (ref 65–99)
GLUCOSE BLD STRIP.AUTO-MCNC: 213 MG/DL (ref 65–99)
GLUCOSE SERPL-MCNC: 117 MG/DL (ref 65–99)
HCT VFR BLD AUTO: 26.2 % (ref 42–52)
HGB BLD-MCNC: 8.5 G/DL (ref 14–18)
MCH RBC QN AUTO: 29.2 PG (ref 27–33)
MCHC RBC AUTO-ENTMCNC: 32.4 G/DL (ref 32.3–36.5)
MCV RBC AUTO: 90 FL (ref 81.4–97.8)
PLATELET # BLD AUTO: 269 K/UL (ref 164–446)
PMV BLD AUTO: 9.9 FL (ref 9–12.9)
POTASSIUM SERPL-SCNC: 4.6 MMOL/L (ref 3.6–5.5)
RBC # BLD AUTO: 2.91 M/UL (ref 4.7–6.1)
SODIUM SERPL-SCNC: 132 MMOL/L (ref 135–145)
WBC # BLD AUTO: 6 K/UL (ref 4.8–10.8)

## 2024-02-20 PROCEDURE — 700111 HCHG RX REV CODE 636 W/ 250 OVERRIDE (IP): Performed by: PHYSICAL MEDICINE & REHABILITATION

## 2024-02-20 PROCEDURE — 82962 GLUCOSE BLOOD TEST: CPT | Mod: 91

## 2024-02-20 PROCEDURE — A9270 NON-COVERED ITEM OR SERVICE: HCPCS | Performed by: PHYSICAL MEDICINE & REHABILITATION

## 2024-02-20 PROCEDURE — 700102 HCHG RX REV CODE 250 W/ 637 OVERRIDE(OP): Performed by: PHYSICAL MEDICINE & REHABILITATION

## 2024-02-20 PROCEDURE — 80048 BASIC METABOLIC PNL TOTAL CA: CPT

## 2024-02-20 PROCEDURE — 97530 THERAPEUTIC ACTIVITIES: CPT | Mod: CQ

## 2024-02-20 PROCEDURE — 97110 THERAPEUTIC EXERCISES: CPT | Mod: CQ

## 2024-02-20 PROCEDURE — 97110 THERAPEUTIC EXERCISES: CPT

## 2024-02-20 PROCEDURE — 36415 COLL VENOUS BLD VENIPUNCTURE: CPT

## 2024-02-20 PROCEDURE — 770010 HCHG ROOM/CARE - REHAB SEMI PRIVAT*

## 2024-02-20 PROCEDURE — 85027 COMPLETE CBC AUTOMATED: CPT

## 2024-02-20 PROCEDURE — 99232 SBSQ HOSP IP/OBS MODERATE 35: CPT | Performed by: INTERNAL MEDICINE

## 2024-02-20 PROCEDURE — 700111 HCHG RX REV CODE 636 W/ 250 OVERRIDE (IP): Mod: JZ | Performed by: INTERNAL MEDICINE

## 2024-02-20 PROCEDURE — 99232 SBSQ HOSP IP/OBS MODERATE 35: CPT | Performed by: PHYSICAL MEDICINE & REHABILITATION

## 2024-02-20 PROCEDURE — 97032 APPL MODALITY 1+ESTIM EA 15: CPT | Mod: CQ

## 2024-02-20 RX ORDER — LOSARTAN POTASSIUM 50 MG/1
50 TABLET ORAL
Status: DISCONTINUED | OUTPATIENT
Start: 2024-02-21 | End: 2024-02-22 | Stop reason: HOSPADM

## 2024-02-20 RX ORDER — TORSEMIDE 20 MG/1
100 TABLET ORAL
Status: DISCONTINUED | OUTPATIENT
Start: 2024-02-21 | End: 2024-02-22 | Stop reason: HOSPADM

## 2024-02-20 RX ORDER — SODIUM CHLORIDE 9 MG/ML
250 INJECTION, SOLUTION INTRAVENOUS
Status: DISCONTINUED | OUTPATIENT
Start: 2024-02-20 | End: 2024-02-22 | Stop reason: HOSPADM

## 2024-02-20 RX ADMIN — OMEPRAZOLE 20 MG: 20 CAPSULE, DELAYED RELEASE ORAL at 08:09

## 2024-02-20 RX ADMIN — TRAZODONE HYDROCHLORIDE 50 MG: 50 TABLET ORAL at 22:21

## 2024-02-20 RX ADMIN — OXYCODONE HYDROCHLORIDE AND ACETAMINOPHEN 500 MG: 500 TABLET ORAL at 17:18

## 2024-02-20 RX ADMIN — ATORVASTATIN CALCIUM 80 MG: 40 TABLET, FILM COATED ORAL at 20:19

## 2024-02-20 RX ADMIN — FUROSEMIDE 40 MG: 10 INJECTION INTRAMUSCULAR; INTRAVENOUS at 05:28

## 2024-02-20 RX ADMIN — HEPARIN SODIUM 5000 UNITS: 5000 INJECTION, SOLUTION INTRAVENOUS; SUBCUTANEOUS at 05:28

## 2024-02-20 RX ADMIN — AMLODIPINE BESYLATE 7.5 MG: 5 TABLET ORAL at 05:28

## 2024-02-20 RX ADMIN — FUROSEMIDE 40 MG: 10 INJECTION INTRAMUSCULAR; INTRAVENOUS at 15:36

## 2024-02-20 RX ADMIN — CHOLESTYRAMINE 4 G: 4 POWDER, FOR SUSPENSION ORAL at 09:49

## 2024-02-20 RX ADMIN — ASPIRIN 81 MG: 81 TABLET, COATED ORAL at 05:28

## 2024-02-20 RX ADMIN — Medication 1 TABLET: at 13:09

## 2024-02-20 RX ADMIN — HEPARIN SODIUM 5000 UNITS: 5000 INJECTION, SOLUTION INTRAVENOUS; SUBCUTANEOUS at 20:19

## 2024-02-20 RX ADMIN — INSULIN HUMAN 3 UNITS: 100 INJECTION, SOLUTION PARENTERAL at 17:21

## 2024-02-20 RX ADMIN — OXYCODONE HYDROCHLORIDE AND ACETAMINOPHEN 500 MG: 500 TABLET ORAL at 05:28

## 2024-02-20 RX ADMIN — CHOLESTYRAMINE 4 G: 4 POWDER, FOR SUSPENSION ORAL at 17:18

## 2024-02-20 RX ADMIN — INSULIN HUMAN 4 UNITS: 100 INJECTION, SOLUTION PARENTERAL at 20:26

## 2024-02-20 RX ADMIN — CALCIUM 500 MG: 500 TABLET ORAL at 08:09

## 2024-02-20 RX ADMIN — HEPARIN SODIUM 5000 UNITS: 5000 INJECTION, SOLUTION INTRAVENOUS; SUBCUTANEOUS at 13:09

## 2024-02-20 RX ADMIN — INSULIN GLARGINE-YFGN 15 UNITS: 100 INJECTION, SOLUTION SUBCUTANEOUS at 20:27

## 2024-02-20 RX ADMIN — Medication 220 MG: at 13:09

## 2024-02-20 ASSESSMENT — ENCOUNTER SYMPTOMS
FEVER: 0
ABDOMINAL PAIN: 0
SHORTNESS OF BREATH: 0

## 2024-02-20 ASSESSMENT — PAIN DESCRIPTION - PAIN TYPE: TYPE: ACUTE PAIN;SURGICAL PAIN

## 2024-02-20 ASSESSMENT — GAIT ASSESSMENTS: GAIT LEVEL OF ASSIST: UNABLE TO PARTICIPATE

## 2024-02-20 ASSESSMENT — ACTIVITIES OF DAILY LIVING (ADL): BED_CHAIR_WHEELCHAIR_TRANSFER_DESCRIPTION: SQUAT PIVOT TRANSFER TO WHEELCHAIR

## 2024-02-20 NOTE — THERAPY
"Recreational Therapy  Daily Treatment     Patient Name: Beni Moore  AGE:  53 y.o., SEX:  male  Medical Record #: [id number]  Today's Date: 2/20/2024       Subjective    Patient reports that he had fun and is \"going to have to get one of these,\" talking about a Nintendo Switch.      Objective       02/20/24 0901   Procedural Tracking   Procedural Tracking Leisure Skills Development;Community Re-Integration   Treatment Time   Total Time Spent (mins) 30   Functional Ability Status - Cognitive   Comprehension Follows Three Step Commands   Judgment Able to Make Independent Decisions   Functional Ability Status - Emotional    Affect Appropriate   Mood Appropriate   Behavior Appropriate   Leisure Competence Measure   Interpersonal Skills Independent   Interdisciplinary Plan of Care Collaboration   Patient Position at End of Therapy Seated   Strengths & Barriers   Strengths Able to follow instructions;Effective communication skills;Independent prior level of function;Supportive family;Willingly participates in therapeutic activities   Barriers Limited mobility;Impaired balance         Assessment    Patient played golf on the Nintendo Switch with a peer. He did not want to do the tutorial and wanted to go directly to playing the game, but then struggled with how to play at first. Once he figured it out he and peer played a game and at the end of the session he and peer were looking at Nintendo Switches to purchase, as he reported that this was something he would like to do with his family while waiting for his prothesis.     Strengths: (P) Able to follow instructions, Effective communication skills, Independent prior level of function, Supportive family, Willingly participates in therapeutic activities  Barriers: (P) Limited mobility, Impaired balance    Plan    Will continue to explore leisure interests.     Passport items to be completed:  Verbalize two positive leisure activities, discuss returning to work, hobbies, " community groups or volunteer activities, explore community resources

## 2024-02-20 NOTE — CARE PLAN
The patient is Stable - Low risk of patient condition declining or worsening    Shift Goals  Clinical Goals: safety  Patient Goals: sleep well, go home    Progress made toward(s) clinical / shift goals:    Problem: Fall Risk - Rehab  Goal: Patient will remain free from falls  Outcome: Progressing. Patient bed in lowest locked position with bed alarm on and call light within reach. Patient id independent in room during daytime hours, and sba during night hours for safety. Patient calls appropriately over shift with call light and has not attempted to self transfer without staff present over shift.      Problem: Pain - Standard  Goal: Alleviation of pain or a reduction in pain to the patient’s comfort goal  Outcome: Progressing. Patient denies having pain over shift. Patient has tylenol and oxycodone available for pain as needed but declined over shift.

## 2024-02-20 NOTE — THERAPY
Physical Therapy   Daily Treatment     Patient Name: Beni Moore  Age:  53 y.o., Sex:  male  Medical Record #: [id number]  Today's Date: 2/20/2024     Precautions  Precautions: Fall Risk, Non Weight Bearing Left Lower Extremity, Immobilizer Left Lower Extremity  Comments: L BKA, WBAT on L UE s/p ORIF    Subjective    Pt seated in w/c upon arrival, agreeable to session.      Objective       02/20/24 0701   PT Charge Group   PT Electrical Stimulation Attended (Units) 1   PT Therapeutic Exercise (Units) 2   PT Therapeutic Activities (Units) 1   Supervising Physical Therapist Cassia Wilson   PT Total Time Spent   PT Individual Total Time Spent (Mins) 60   Cognition    Level of Consciousness Alert   Gait Functional Level of Assist    Gait Level Of Assist Unable to Participate   Wheelchair Functional Level of Assist   Wheelchair Assist Modified Independent   Distance Wheelchair (Feet or Distance) 150+   Stairs Functional Level of Assist   Level of Assist with Stairs Unable to Participate   Transfer Functional Level of Assist   Bed, Chair, Wheelchair Transfer Modified Independent   Bed Chair Wheelchair Transfer Description Squat pivot transfer to wheelchair  (w/c <> shuttle for leg press)   Supine Lower Body Exercise   Other Exercises RLE leg press on shuttle- 2x15 w/ 4 bands, 2x15 w/ 6 bands   Standing Lower Body Exercises   Hip Flexion 2 sets of 15;Left   Hip Extension 2 sets of 15;Left   Hip Abduction 2 sets of 15;Left   Bed Mobility    Supine to Sit Supervised  (on shuttle)   Sit to Supine Supervised  (on shuttle)   Sit to Stand Supervised  (in // bars)   Neuro-Muscular Treatments   Comments static standing in // bars w/ BUE support, 5 +3 mins while during standing TE   Interdisciplinary Plan of Care Collaboration   Patient Position at End of Therapy Seated  (in cafeteria)     Estim and MH applied to L shoulder while pt's on shuttle for RLE leg press, total of 15 mins. Skin checked after removing electrodes, no ABN  signs.    Assessment    Pt tolerated session well, demonstrated good understanding in self care, safety awareness and is Parminder at w/c level. He is motivated to progress, feeling ready to be d/c in 2 days.      Strengths: Able to follow instructions, Alert and oriented, Effective communication skills, Good carryover of learning, Good insight into deficits/needs, Independent prior level of function, Motivated for self care and independence, Pleasant and cooperative, Supportive family, Willingly participates in therapeutic activities  Barriers: Fatigue, Generalized weakness, Impaired activity tolerance, Impaired balance, Pain    Plan    Continue with mat program, emphasize prone positioning for stretching, AAROM/ PROM and stretching to L shoulder, transfer training from various heights, car transfer, WC mobility outdoors, standing with FWW. Pt is cleared for WBAT LUE        DME  PT DME Recommendations  Wheelchair:  (Pt already has his own WC and slideboard)  Assistive Device: Front Wheeled Walker     Passport items to be completed:  Get in/out of bed safely, in/out of a vehicle, safely use mobility device, walk or wheel around home/community, navigate up and down stairs, show how to get up/down from the ground, ensure home is accessible, demonstrate HEP, complete caregiver training    Physical Therapy Problems (Active)       Problem: Balance       Dates: Start:  02/09/24         Goal: STG-Within one week, patient will maintain static standing in parallel bars Gina with single UE support for 10 seconds in order to facilitate completion of standing ADLs       Dates: Start:  02/09/24               Problem: Mobility       Dates: Start:  02/09/24         Goal: STG-Within one week, patient will propel wheelchair community distances of at least 150' independently       Dates: Start:  02/09/24               Problem: Mobility Transfers       Dates: Start:  02/09/24         Goal: STG-Within one week, patient will sit to stand  from WC Gina with UE pushing from WC        Dates: Start:  02/09/24               Problem: PT-Long Term Goals       Dates: Start:  02/09/24         Goal: LTG-By discharge, patient will tolerate standing with intermittent UE support for at least 1 minute CGA in order to facilitate standing ADLs        Dates: Start:  02/09/24            Goal: LTG-By discharge, patient will perform squat pivot transfer from one surface to another SPV       Dates: Start:  02/09/24            Goal: LTG-By discharge, patient will perform home exercise program independently        Dates: Start:  02/09/24            Goal: LTG-By discharge, patient will transfer in/out of a car using LRAD or no AD CGA       Dates: Start:  02/09/24

## 2024-02-20 NOTE — THERAPY
Occupational Therapy  Daily Treatment     Patient Name: Beni Moore  Age:  53 y.o., Sex:  male  Medical Record #: [id number]  Today's Date: 2/20/2024     Precautions  Precautions: Fall Risk, Non Weight Bearing Left Lower Extremity, Immobilizer Left Lower Extremity  Comments: L BKA, WBAT on L UE s/p ORIF         Subjective  Pt retrieved from w/c in room, agreeable to OT session. Reports that he is doing well.      Objective     02/20/24 1001   OT Charge Group   OT Therapeutic Exercise (Units) 4   OT Total Time Spent   OT Individual Total Time Spent (Mins) 60   Functional Level of Assist   Lower Body Dressing Supervision  (to don R sock seated in w/c)   Sitting Upper Body Exercises   Comments UE motomed for ROM, 5 minutes   Interdisciplinary Plan of Care Collaboration   IDT Collaboration with  Physician   Patient Position at End of Therapy Seated   Collaboration Comments Dr. Ny rounds     Total A to don compression stocking.     TENS unit applied to L shoulder for pain, pt endorses pain relief. Observed skin after removal of electrodes, no abnormal signs.     Pt completed towel slides on tabletop for L shoulder stretch and mobility. 1 x 10 reps counter/clockwise rotation, horizontal ab/duction, and flexion/extension. Pt with increased AROM after towel slide exercises.     Assessment  Patient had good tolerance to session with focus on LUE mobility. He demonstrates good return demo of exercises provided in therapy: pendulum, isometrics, stretch. He will require training to assist with safely and independently donning compression stockings.   Strengths: Able to follow instructions, Alert and oriented, Effective communication skills, Good endurance, Good insight into deficits/needs, Independent prior level of function, Motivated for self care and independence, Pleasant and cooperative, Supportive family, Willingly participates in therapeutic activities  Barriers: Decreased endurance, Fatigue, Generalized weakness,  Impaired activity tolerance, Impaired balance, Limited mobility    Plan  Shower tomorrow [id number]  Compression stocking training     DME  OT DME Recommendations  Bathroom Equipment:  (Pt has acquired necessary DME for bathroom to support safety and independence with ADLs.)      Occupational Therapy Goals (Active)       Problem: Bathing       Dates: Start:  02/09/24         Goal: STG-Within one week, patient will bathe w/ CGA and AE/DME as needed       Dates: Start:  02/09/24               Problem: Dressing       Dates: Start:  02/09/24         Goal: STG-Within one week, patient will dress LB w/ min A and AE as needed       Dates: Start:  02/09/24               Problem: IADL's       Dates: Start:  02/09/24         Goal: STG-Within one week, patient will access kitchen area w/ min A and LRAD       Dates: Start:  02/09/24               Problem: OT Long Term Goals       Dates: Start:  02/09/24         Goal: LTG-By discharge, patient will complete basic self care tasks w/ mod I/supervision and AE/DME as needed       Dates: Start:  02/09/24            Goal: LTG-By discharge, patient will complete basic home management w/ supervision and LRAD       Dates: Start:  02/09/24               Problem: Toileting       Dates: Start:  02/09/24         Goal: STG-Within one week, patient will complete toileting tasks w/ min A and AE/DME as needed       Dates: Start:  02/09/24

## 2024-02-20 NOTE — CARE PLAN
Problem: Knowledge Deficit - Standard  Goal: Patient and family/care givers will demonstrate understanding of plan of care, disease process/condition, diagnostic tests and medications  Outcome: Progressing     Problem: Infection - Diabetes  Goal: Patient will remain free from signs and symptoms of infection  Note: Patient remains free from s/s infection; afebrile.  Will continue to monitor.   The patient is Stable - Low risk of patient condition declining or worsening    Shift Goals  Clinical Goals: Safety  Patient Goals: sleep well, go home, gain strength

## 2024-02-20 NOTE — PROGRESS NOTES
Nephrology Daily Progress Note    Date of Service  2/20/2024    Chief Complaint  53 y.o. male admitted 1/25/2024 with diabetic foot infection eventually requiring left BKA 2/1/2024, complicated by AXEL on CKD stage III, required dialysis, admitted to rehab hospital 2/8/2024 for rehabilitation.    Interval Problem Update  Patient has no chest pain or shortness of breath today  He is being evaluated for inpatient rehab  2/8 patient has no new complaints  Plan to transfer to rehab today  2/9 patient has no new complaints, feels better overall  2/10 pt is doing better, no CP, no SOB  2/12 -doing better, good UOP  Creat stable -off dialysis  LE edema -on lasix, low salt diet  2/15 -doing better  Edema improving  Off dialysis  CVC checked  2/20-urine output 2.4 L in the last 24 hours. Appetite good, denies chest pain, SOB, N/V.     Review of Systems  Review of Systems   Constitutional:  Negative for fever.   Respiratory:  Negative for shortness of breath.    Cardiovascular:  Positive for leg swelling. Negative for chest pain.   Gastrointestinal:  Negative for abdominal pain.   All other systems reviewed and are negative.       Physical Exam  Temp:  [36.5 °C (97.7 °F)-36.8 °C (98.2 °F)] 36.6 °C (97.9 °F)  Pulse:  [78] 78  Resp:  [16-18] 18  BP: (137-140)/(74-78) 137/77  SpO2:  [97 %-100 %] 100 %    Physical Exam  Constitutional:       General: He is not in acute distress.     Appearance: Normal appearance.   HENT:      Head: Normocephalic and atraumatic.      Nose: Nose normal. No rhinorrhea.      Mouth/Throat:      Mouth: Mucous membranes are moist.      Pharynx: Oropharynx is clear.   Eyes:      General: No scleral icterus.     Extraocular Movements: Extraocular movements intact.      Conjunctiva/sclera: Conjunctivae normal.   Cardiovascular:      Rate and Rhythm: Normal rate and regular rhythm.      Heart sounds: No murmur heard.  Pulmonary:      Effort: Pulmonary effort is normal.      Breath sounds: Normal breath  sounds. No rales.   Abdominal:      General: Abdomen is flat. There is no distension.      Palpations: Abdomen is soft.      Tenderness: There is no abdominal tenderness.   Musculoskeletal:         General: Deformity (left BKA noted.) present.      Cervical back: Normal range of motion and neck supple.      Right lower leg: Edema (2+) present.      Left lower leg: Edema (2+) present.   Skin:     General: Skin is warm and dry.   Neurological:      General: No focal deficit present.      Mental Status: He is alert and oriented to person, place, and time. Mental status is at baseline.   Psychiatric:         Mood and Affect: Mood normal.         Behavior: Behavior normal.     Dialysis access: [facility]    Fluids    Intake/Output Summary (Last 24 hours) at 2/20/2024 1518  Last data filed at 2/20/2024 1300  Gross per 24 hour   Intake 720 ml   Output 2400 ml   Net -1680 ml       Laboratory  Recent Labs     02/19/24  0559 02/20/24  0601   WBC 5.4 6.0   RBC 2.63* 2.91*   HEMOGLOBIN 7.7* 8.5*   HEMATOCRIT 23.6* 26.2*   MCV 89.7 90.0   MCH 29.3 29.2   MCHC 32.6 32.4   RDW 49.8 49.6   PLATELETCT 232 269   MPV 10.0 9.9     Recent Labs     02/18/24  0545 02/19/24  0559 02/20/24  0601   SODIUM 134* 134* 132*   POTASSIUM 4.8 4.6 4.6   CHLORIDE 105 106 104   CO2 19* 19* 19*   GLUCOSE 146* 163* 117*   BUN 57* 60* 61*   CREATININE 3.15* 3.05* 3.06*   CALCIUM 7.6* 7.6* 7.9*         Recent Labs     02/18/24  0545   NTPROBNP 5638*           Imaging  No orders to display         Assessment/Plan  53 y.o. male admitted 1/25/2024 with diabetic foot infection eventually requiring left BKA 2/1/2024, complicated by AXEL on CKD stage III, required dialysis, admitted to rehab hospital 2/8/2024 for rehabilitation.    1. CKD stage 5. Patient's true kidney function is worse than GFR, 24-hour creatinine clearance of 17 on 2/9/24, when he had plasma creatinine of 2.79 and eGFR of 26. Discrepancy is due to low muscle mass and left BKA. Patient still  with edema, metabolic acidosis, and anemia. I believe he requires dialysis, but can suffice with incremental dialysis once weekly. Recommend HD tomorrow Wednesday 2/21/24, and once weekly moving forward. He says his mother is quite healthy and would be willing to be a donor.     2. HD access: [facility].     3. Anemia of chronic disease, uncontrolled. He has not been receiving epogen while inpatient at rehab. Will administer epo 8000 units with HD tomorrow. Check CBC thrice weekly, preferably on Monday Wednesday Friday.     4. Metabolic acidosis, due to advanced CKD. No need for bicarb supplements as this should improve with dialysis.     5. Hyponatremia, due to patient drinking too much water. Restrict fluids to 1.5L daily. Limit hypotonic fluids.  Do not order salt tabs.  Check serum sodium daily.      6. HTN, uncontrolled, due to ongoing volume overload. To facilitate discharge, stop lasix IV and start torsemide 100mg PO Daily. Resume ACE-I/ARB with losartan 50mg daily. HTN will likely improve with ongoing diuresis and dialysis, might be able to down-titrate amlodipine.     7. Diabetic left foot infection s/p left BKA 2/1/24. Now in rehab hospital for PT/OT. Continue diuretics and dialysis as above to reduce edema so patient can have a proper prosthetic fit.     8. CKD-MBD. I recommend against calcium supplements. Patient has a low albumin and his corrected calcium has been normal.     Discussed with Dr. Mena Ny.    Varun Ortiz MD  Nephrology  [facility]

## 2024-02-20 NOTE — PROGRESS NOTES
NURSING DAILY NOTE    Name: Beni Moore  Date of Admission: 2/8/2024  Admitting Diagnosis: Hx of BKA, left (HCC)  Attending Physician: Mena Ny D.o.  Allergies: Patient has no known allergies.    Safety  Patient Assist  osba at night, independent during daytime hours  Patient Precautions  oFall Risk, Non Weight Bearing Left Lower Extremity, Immobilizer Left Lower Extremity  Precaution Comments  oL BKA, WBAT on L UE s/p ORIF  Bed Transfer Status  oModified Independent  Toilet Transfer Status  oStandby Assist (w/c <> bariatric drop arm commode lateral scoot)  Assistive Devices  oRails, Wheelchair  Oxygen  oNone - Room Air  Diet/Therapeutic Dining  o  Current Diet Order   Procedures    Diet Order Diet: Consistent CHO (Diabetic); Second Modifier: (optional): Renal     Pill Administration  owhole  Agitated Behavioral Scale  o17  ABS Level of Severity  Brad Agitation    Fall Risk  Has the patient had a fall this admission?  Brad  Florecita Roldan Fall Risk Scoring  o22, HIGH RISK  Fall Risk Safety Measures  breezy alarm and during nights, independent with wc during daytime hours    Vitals  Temperature: 36.8 °C (98.2 °F)  Temp src: Oral  Pulse: 78  Respiration: 18  Blood Pressure: (!) 140/74  Blood Pressure MAP (Calculated): 96 MM HG  BP Location: Left, Upper Arm  Patient BP Position: Mazariegos's Position    Oxygen  Pulse Oximetry: 97 %  O2 (LPM): 0  O2 Delivery Device: None - Room Air    Bowel and Bladder  Last Bowel Movement  o02/19/24 (per patient)  Stool Type  oType 3: Like a sausage, but with cracks on its surface  Bowel Device  oBathroom  Continent  oBladder: Continent void  oBowel: Continent movement  Bladder Function  oUrine Void (mL): 500 ml  Number of Times Voided: 1  Urine Color: Yellow  Genitourinary Assessment  oBladder Assessment (WDL):  Within Defined Limits  Farrell Catheter: Not Applicable  Urinary Elimination:  (able to void)  Urine Color: Yellow  Bladder Device: Urinal  Time Void: Yes  Bladder Scan:  Post Void  $ Bladder Scan Results (mL): 78    Skin  Johnny Score  o 18  Sensory Interventions  o Bed Types: Standard/Trauma Mattress with Overlay  Skin Preventative Measures: Pillows in Use for Support / Positioning, Waffle Overlay  Moisture Interventions  oMoisturizers/Barriers: Barrier Wipes      Pain  Pain Rating Scale  o0 - No Pain  Pain Location  oShoulder  Pain Location Orientation  oLeft  Pain Interventions  oDeclines    ADLs    Bathing  oShower, Staff (pt shower by ot)  Linen Change  oPartial  Personal Hygiene  o   Chlorhexidine Bath  o   Oral Care  oBrushed Teeth (self)  Teeth/Dentures  o   Shave  oSelf  Nutrition Percentage Eaten  o*  * Meal *  *, Dinner  Environmental Precautions  oPersonal Belongings, Wastebasket, [place] etc. in Easy Reach, Transferred to Stronger Side, Bed in Low Position  Patient Turns/Positioning  oPatient Turns Self from Side to Side  Patient Turns Assistance/Tolerance  oStandby Assist  Bed Positions  Francheska Controls On, Bed Locked  Head of Bed Elevated  oSelf regulated      Psychosocial/Neurologic Assessment  Psychosocial Assessment  oPsychosocial (WDL):  Within Defined Limits  Neurologic Assessment  oNeuro (WDL): Exceptions to WDL  Level of Consciousness: Alert  Orientation Level: Oriented X4  Cognition: Follows commands, Appropriate attention/concentration  Speech: Clear  Motor Function/Sensation Assessment: Motor strength  Muscle Strength Right Arm: Good Strength Against Gravity and Moderate Resistance  Muscle Strength Left Arm: Good Strength Against Gravity and Moderate Resistance  Muscle Strength Right Leg: Fair Strength against Gravity but No Resistance  Muscle Strength Left Leg:  (left bka)  oEENT (WDL):  WDL Except    Cardio/Pulmonary Assessment  Edema  oLLE Edema: 1+  Respiratory Breath Sounds  oRUL Breath Sounds: Clear  RML Breath Sounds: Clear  RLL Breath Sounds: Clear  CHRIS Breath Sounds: Clear  LLL Breath Sounds: Clear  Cardiac Assessment  oCardiac (WDL):  WDL  Except

## 2024-02-20 NOTE — PROGRESS NOTES
NURSING DAILY NOTE    Name: Beni Moore   Date of Admission: 2/8/2024   Admitting Diagnosis: Hx of BKA, left (HCC)  Attending Physician: Mena Ny D.o.  Allergies: Patient has no known allergies.    Safety  Patient Assist  min a  Patient Precautions  Fall Risk, Non Weight Bearing Left Lower Extremity, Immobilizer Left Lower Extremity  Precaution Comments  L BKA, WBAT on L UE s/p ORIF  Bed Transfer Status  Modified Independent  Toilet Transfer Status   Standby Assist (w/c <> bariatric drop arm commode lateral scoot)  Assistive Devices  Rails  Oxygen  None - Room Air  Diet/Therapeutic Dining  Current Diet Order   Procedures    Diet Order Diet: Consistent CHO (Diabetic); Second Modifier: (optional): Renal     Pill Administration  whole  Agitated Behavioral Scale  17  ABS Level of Severity  No Agitation    Fall Risk  Has the patient had a fall this admission?   No  Florecita Roldan Fall Risk Scoring  22, HIGH RISK  Fall Risk Safety Measures  Pt. Has been cleared to be independent for transfers. Supervision at night time.    Vitals  Temperature: 36.5 °C (97.7 °F)  Temp src: Oral  Pulse: 78  Respiration: 16  Blood Pressure: 138/78  Blood Pressure MAP (Calculated): 98 MM HG  BP Location: Left  Patient BP Position: Sitting     Oxygen  Pulse Oximetry: 99 %  O2 (LPM): 0  O2 Delivery Device: None - Room Air    Bowel and Bladder  Last Bowel Movement  02/18/24  Stool Type  Type 3: Like a sausage, but with cracks on its surface  Bowel Device  Bathroom  Continent  Bladder: Continent void   Bowel: Continent movement  Bladder Function  Urine Void (mL): 600 ml  Number of Times Voided: 1  Urine Color: Yellow  Genitourinary Assessment   Bladder Assessment (WDL):  Within Defined Limits  Farrell Catheter: Not Applicable  Urinary Elimination:  (able to void)  Urine Color: Yellow  Bladder Device: Urinal  Time Void: Yes  Bladder Scan: Post Void  $ Bladder Scan Results (mL):  78    Skin  Johnny Score   18  Sensory Interventions   Bed Types: Standard/Trauma Mattress  Skin Preventative Measures: Pillows in Use for Support / Positioning  Moisture Interventions  Moisturizers/Barriers: Barrier Wipes      Pain  Pain Rating Scale  0 - No Pain  Pain Location  Shoulder  Pain Location Orientation  Left  Pain Interventions   Declines    ADLs    Bathing   Shower, Staff (pt shower by ot)  Linen Change   Partial  Personal Hygiene     Chlorhexidine Bath      Oral Care  Brushed Teeth (self)  Teeth/Dentures     Shave  Self  Nutrition Percentage Eaten  *  * Meal *  *, Dinner  Environmental Precautions  Treaded Slipper Socks on Patient, Bed in Low Position  Patient Turns/Positioning  Patient Turns Self from Side to Side  Patient Turns Assistance/Tolerance  Assistance of One  Bed Positions  Bed Controls On, Bed Locked  Head of Bed Elevated  Self regulated      Psychosocial/Neurologic Assessment  Psychosocial Assessment  Psychosocial (WDL):  Within Defined Limits  Neurologic Assessment  Neuro (WDL): Exceptions to WDL  Level of Consciousness: Alert  Orientation Level: Oriented X4  Cognition: Follows commands, Appropriate attention/concentration  Speech: Clear  Muscle Strength Right Arm: Good Strength Against Gravity and Moderate Resistance  Muscle Strength Left Arm: Good Strength Against Gravity and Moderate Resistance  Muscle Strength Right Leg: Fair Strength against Gravity but No Resistance  Muscle Strength Left Leg:  (BKA)  EENT (WDL):  WDL Except    Cardio/Pulmonary Assessment  Edema   LLE Edema: 1+  Respiratory Breath Sounds  RUL Breath Sounds: Clear  RML Breath Sounds: Clear  RLL Breath Sounds: Clear  CHRIS Breath Sounds: Clear  LLL Breath Sounds: Clear  Cardiac Assessment   Cardiac (WDL):  WDL Except (hx of htn)

## 2024-02-20 NOTE — PROGRESS NOTES
Physical Medicine & Rehabilitation Progress Note    Encounter Date: 2/20/2024    Chief Complaint: Feeling great    Interval Events (Subjective):  Blood pressure better controlled in the 130s and 140s  Bowel movement 2/19 voiding volitionally    Patient seen and examined in therapy gym. He is feeling well. He heard from the nurse he may need dialysis.       ROS: 14 point ROS negative unless otherwise specified in the HPI    Objective:  VITAL SIGNS: BP (!) 140/74   Pulse 78   Temp 36.8 °C (98.2 °F) (Oral)   Resp 18   Ht 1.829 m (6')   Wt 102 kg (225 lb)   SpO2 97%   BMI 30.52 kg/m²     GEN: No apparent distress  HEENT: Head normocephalic, atraumatic.  Sclera nonicteric bilaterally, no ocular discharge appreciated bilaterally.  CV: Extremities warm and well-perfused, peripheral edema improving  PULMONARY: Breathing nonlabored on room air, no respiratory accessory muscle use.  Not requiring supplemental oxygen.  PSYCH: Mood and affect within normal limits.  NEURO: Awake alert.  Conversational.  Logical thought content.    Skin: Left BKA 2/18        Laboratory Values:  Recent Results (from the past 72 hour(s))   POCT glucose device results    Collection Time: 02/17/24 11:52 AM   Result Value Ref Range    POC Glucose, Blood 119 (H) 65 - 99 mg/dL   POCT glucose device results    Collection Time: 02/17/24  5:04 PM   Result Value Ref Range    POC Glucose, Blood 200 (H) 65 - 99 mg/dL   POCT glucose device results    Collection Time: 02/17/24  8:23 PM   Result Value Ref Range    POC Glucose, Blood 159 (H) 65 - 99 mg/dL   Basic Metabolic Panel    Collection Time: 02/18/24  5:45 AM   Result Value Ref Range    Sodium 134 (L) 135 - 145 mmol/L    Potassium 4.8 3.6 - 5.5 mmol/L    Chloride 105 96 - 112 mmol/L    Co2 19 (L) 20 - 33 mmol/L    Glucose 146 (H) 65 - 99 mg/dL    Bun 57 (H) 8 - 22 mg/dL    Creatinine 3.15 (H) 0.50 - 1.40 mg/dL    Calcium 7.6 (L) 8.5 - 10.5 mg/dL    Anion Gap 10.0 7.0 - 16.0   PHOSPHORUS     Collection Time: 02/18/24  5:45 AM   Result Value Ref Range    Phosphorus 3.2 2.5 - 4.5 mg/dL   proBrain Natriuretic Peptide, NT    Collection Time: 02/18/24  5:45 AM   Result Value Ref Range    NT-proBNP 5638 (H) 0 - 125 pg/mL   ESTIMATED GFR    Collection Time: 02/18/24  5:45 AM   Result Value Ref Range    GFR (CKD-EPI) 22 (A) >60 mL/min/1.73 m 2   POCT glucose device results    Collection Time: 02/18/24  7:47 AM   Result Value Ref Range    POC Glucose, Blood 134 (H) 65 - 99 mg/dL   POCT glucose device results    Collection Time: 02/18/24 10:55 AM   Result Value Ref Range    POC Glucose, Blood 158 (H) 65 - 99 mg/dL   POCT glucose device results    Collection Time: 02/18/24  5:25 PM   Result Value Ref Range    POC Glucose, Blood 152 (H) 65 - 99 mg/dL   POCT glucose device results    Collection Time: 02/18/24  8:34 PM   Result Value Ref Range    POC Glucose, Blood 147 (H) 65 - 99 mg/dL   Basic Metabolic Panel    Collection Time: 02/19/24  5:59 AM   Result Value Ref Range    Sodium 134 (L) 135 - 145 mmol/L    Potassium 4.6 3.6 - 5.5 mmol/L    Chloride 106 96 - 112 mmol/L    Co2 19 (L) 20 - 33 mmol/L    Glucose 163 (H) 65 - 99 mg/dL    Bun 60 (H) 8 - 22 mg/dL    Creatinine 3.05 (H) 0.50 - 1.40 mg/dL    Calcium 7.6 (L) 8.5 - 10.5 mg/dL    Anion Gap 9.0 7.0 - 16.0   CBC WITHOUT DIFFERENTIAL    Collection Time: 02/19/24  5:59 AM   Result Value Ref Range    WBC 5.4 4.8 - 10.8 K/uL    RBC 2.63 (L) 4.70 - 6.10 M/uL    Hemoglobin 7.7 (L) 14.0 - 18.0 g/dL    Hematocrit 23.6 (L) 42.0 - 52.0 %    MCV 89.7 81.4 - 97.8 fL    MCH 29.3 27.0 - 33.0 pg    MCHC 32.6 32.3 - 36.5 g/dL    RDW 49.8 35.9 - 50.0 fL    Platelet Count 232 164 - 446 K/uL    MPV 10.0 9.0 - 12.9 fL   ESTIMATED GFR    Collection Time: 02/19/24  5:59 AM   Result Value Ref Range    GFR (CKD-EPI) 23 (A) >60 mL/min/1.73 m 2   POCT glucose device results    Collection Time: 02/19/24  7:23 AM   Result Value Ref Range    POC Glucose, Blood 155 (H) 65 - 99 mg/dL   POCT  glucose device results    Collection Time: 02/19/24 11:10 AM   Result Value Ref Range    POC Glucose, Blood 134 (H) 65 - 99 mg/dL   POCT glucose device results    Collection Time: 02/19/24  5:26 PM   Result Value Ref Range    POC Glucose, Blood 202 (H) 65 - 99 mg/dL   POCT glucose device results    Collection Time: 02/19/24  8:33 PM   Result Value Ref Range    POC Glucose, Blood 179 (H) 65 - 99 mg/dL   Basic Metabolic Panel    Collection Time: 02/20/24  6:01 AM   Result Value Ref Range    Sodium 132 (L) 135 - 145 mmol/L    Potassium 4.6 3.6 - 5.5 mmol/L    Chloride 104 96 - 112 mmol/L    Co2 19 (L) 20 - 33 mmol/L    Glucose 117 (H) 65 - 99 mg/dL    Bun 61 (H) 8 - 22 mg/dL    Creatinine 3.06 (H) 0.50 - 1.40 mg/dL    Calcium 7.9 (L) 8.5 - 10.5 mg/dL    Anion Gap 9.0 7.0 - 16.0   CBC WITHOUT DIFFERENTIAL    Collection Time: 02/20/24  6:01 AM   Result Value Ref Range    WBC 6.0 4.8 - 10.8 K/uL    RBC 2.91 (L) 4.70 - 6.10 M/uL    Hemoglobin 8.5 (L) 14.0 - 18.0 g/dL    Hematocrit 26.2 (L) 42.0 - 52.0 %    MCV 90.0 81.4 - 97.8 fL    MCH 29.2 27.0 - 33.0 pg    MCHC 32.4 32.3 - 36.5 g/dL    RDW 49.6 35.9 - 50.0 fL    Platelet Count 269 164 - 446 K/uL    MPV 9.9 9.0 - 12.9 fL   ESTIMATED GFR    Collection Time: 02/20/24  6:01 AM   Result Value Ref Range    GFR (CKD-EPI) 23 (A) >60 mL/min/1.73 m 2   POCT glucose device results    Collection Time: 02/20/24  8:08 AM   Result Value Ref Range    POC Glucose, Blood 114 (H) 65 - 99 mg/dL       Medications:  Scheduled Medications   Medication Dose Frequency    amLODIPine  7.5 mg Q DAY    furosemide  40 mg BID DIURETIC    cholestyramine  1 Packet BID    therapeutic multivitamin-minerals  1 Tablet DAILY WITH LUNCH    zinc sulfate  220 mg DAILY WITH LUNCH    ascorbic acid  500 mg BID WITH MEALS    aspirin  81 mg DAILY    atorvastatin  80 mg Q EVENING    calcium carbonate  500 mg BID WITH MEALS    epoetin  5,000 Units MO, WE + FR    heparin  5,000 Units Q8HRS    insulin GLARGINE  15  Units Q EVENING    insulin regular  3-14 Units 4X/DAY ACHS    omeprazole  20 mg QAM    Pharmacy Consult Request  1 Each PHARMACY TO DOSE     PRN medications: senna-docusate **AND** polyethylene glycol/lytes **AND** bisacodyl, Respiratory Therapy Consult, hydrALAZINE, carboxymethylcellulose, benzocaine-menthol, mag hydrox-al hydrox-simeth, ondansetron **OR** ondansetron, traZODone, sodium chloride, insulin regular **AND** POC blood glucose manual result **AND** NOTIFY MD and PharmD **AND** Administer 20 grams of glucose (approximately 8 ounces of fruit juice) every 15 minutes PRN FSBG less than 70 mg/dL **AND** dextrose bolus, acetaminophen, heparin, lidocaine PF, oxyCODONE immediate-release **OR** oxyCODONE immediate-release    Diet:  Current Diet Order   Procedures    Diet Order Diet: Consistent CHO (Diabetic); Second Modifier: (optional): Renal       Medical Decision Making and Plan:  Left BKA secondary to acute on chronic osteomyelitis in setting of diabetes mellitus  Osteomyelitis resolved with infection control with BKA and s/p IV antibiotics  PT and OT for mobility and ADLs. Per guidelines, 15 hours per week between PT, OT and/or SLP.  Follow-up orthopedics    2/20 pictures reviewed from 2/18, no concerns with residual limb healing     Acute blood loss anemia  Postoperative and CKD, required 1 unit PRBC  Hemoglobin stable 2/20 at 8.5  Nephrology following     Acute renal failure stage IIIb CKD on HD  S/p right IJ tunneled cath placement 1/29/2024  Hemodialysis Monday, Wednesday, Friday 2/12 HD on HOLD. Hoping for renal recovery.   2/20 Continue HD hold.  Renal function stable  Nephrology following     Type 2 diabetes mellitus with hyperglycemia, insulin-dependent  Long-term complications causing CKD, peripheral neuropathy, neurogenic bowel  Continue long-acting and short acting insulin  At home had been on Lantus 36 at night with SSI     Hypocalcemia  Continue supplementing 2/20    Hyperphosphatemia  Resolved  on admission 2.7    Hyponatremia  Patient has specific diet for his GI system  On dialysis  Nephrology following    Elevated TSH  Normal free T4  Continue to monitor     History of CAD s/p ARTURO 9/22/2019  Last cardiology note 7/7/2023: On atorvastatin 80 mg, aspirin 81 mg, carvedilol 3.125 mg twice daily, Jardiance 25 mg daily  Continue aspirin 81 mg daily and statin 80 mg nightly     Hypertension  Last cardiology note 7/7/2023: Previously had been on carvedilol 3.125 mg twice daily and enalapril 10 mg twice daily  2/9 blood pressures running in the 130s-150s. Hold on BP medication initiation, potential diuresis.   2/12 blood pressure elevated.  He is on Lasix 60 mg twice daily. D/w nephrology start amlodipine 5mg daily. Hold on BB due to HR, hold on ACE-I due to hopefully return of renal function. Will need to start reducing Lasix.   2/13 BP better controlled. Increase Lasix to 80mg daily - cleared with nephrology.   2/19 's-150's, on Lasix 40mg BID 2/18. Increase Norvasc 7.5 mg daily.   2/20 blood pressure better controlled on increased dose of Norvasc, continue to monitor.     Pain  Currently not reporting pain  As needed Tylenol  As needed oxycodone     Skin  Patient at risk for skin breakdown due to debility in areas including sacrum, achilles, elbows and head in addition to other sites. Nursing to assess skin daily.      GI Ppx  Patient on Prilosec for GERD prophylaxis.      Bowel   Neurogenic bowel secondary to long-term complications from diabetes  Colestyramine at 10 AM and 5 PM  Follows with GI  Patient on Senna-docusate for constipation prophylaxis.   Abnormal frequency of bowel movements at baseline.  Can go 5 to 7 days.     Bladder  TV/PVR/BS PRN  PVRs in 200's, likely positional. Monitor    Left Humeral Fracture 12/2023 s/p ORIF 12/11/23 Dr. Aris Pierre  D/w Dr. Pierre 2/14/2024 and is WBAT with ROM as tolerated L shoulder/arm     Upcoming Labs/imaging: Daily BMP per Nephro to follow renal  function off of dialysis     DVT PROPHYLAXIS: Heparin 5000 units q8 hrs     HOSPITALIST FOLLOWING: No. Nephrology following. D/w nephrology via [facility] 2/20/2024       CODE STATUS: FULL CODE     DISPO: Home with supportive parents.      LISE: 2/22/24     ADDITIONAL MEDICAL NEEDS ON D/C (IV abx, O2, etc): TBD     MEDS SENT TO: M2B Eligible.     DISCHARGE SPECIALIST FOLLOW UP: Orthopedics (ROCK Segura)     Patient to scheduled follow up with their PCP within 2 weeks from discharge from the [facility].      ____________________________________    Dr. Mena Ny DO, MS  [facility] - Physical Medicine & Rehabilitation   ____________________________________

## 2024-02-21 ENCOUNTER — APPOINTMENT (OUTPATIENT)
Dept: OCCUPATIONAL THERAPY | Facility: REHABILITATION | Age: 54
DRG: 559 | End: 2024-02-21
Attending: PHYSICAL MEDICINE & REHABILITATION
Payer: MEDICARE

## 2024-02-21 ENCOUNTER — APPOINTMENT (OUTPATIENT)
Dept: PHYSICAL THERAPY | Facility: REHABILITATION | Age: 54
DRG: 559 | End: 2024-02-21
Attending: PHYSICAL MEDICINE & REHABILITATION
Payer: MEDICARE

## 2024-02-21 PROBLEM — G47.9 DIFFICULTY SLEEPING: Status: ACTIVE | Noted: 2024-02-21

## 2024-02-21 LAB
ALBUMIN SERPL BCP-MCNC: 2.4 G/DL (ref 3.2–4.9)
ANION GAP SERPL CALC-SCNC: 10 MMOL/L (ref 7–16)
BUN SERPL-MCNC: 64 MG/DL (ref 8–22)
CALCIUM SERPL-MCNC: 7.4 MG/DL (ref 8.5–10.5)
CHLORIDE SERPL-SCNC: 105 MMOL/L (ref 96–112)
CO2 SERPL-SCNC: 18 MMOL/L (ref 20–33)
CREAT SERPL-MCNC: 3.24 MG/DL (ref 0.5–1.4)
ERYTHROCYTE [DISTWIDTH] IN BLOOD BY AUTOMATED COUNT: 49.1 FL (ref 35.9–50)
GFR SERPLBLD CREATININE-BSD FMLA CKD-EPI: 22 ML/MIN/1.73 M 2
GLUCOSE BLD STRIP.AUTO-MCNC: 103 MG/DL (ref 65–99)
GLUCOSE BLD STRIP.AUTO-MCNC: 122 MG/DL (ref 65–99)
GLUCOSE BLD STRIP.AUTO-MCNC: 131 MG/DL (ref 65–99)
GLUCOSE BLD STRIP.AUTO-MCNC: 212 MG/DL (ref 65–99)
GLUCOSE SERPL-MCNC: 139 MG/DL (ref 65–99)
HCT VFR BLD AUTO: 23.6 % (ref 42–52)
HGB BLD-MCNC: 7.9 G/DL (ref 14–18)
MCH RBC QN AUTO: 29.8 PG (ref 27–33)
MCHC RBC AUTO-ENTMCNC: 33.5 G/DL (ref 32.3–36.5)
MCV RBC AUTO: 89.1 FL (ref 81.4–97.8)
PHOSPHATE SERPL-MCNC: 3.1 MG/DL (ref 2.5–4.5)
PLATELET # BLD AUTO: 222 K/UL (ref 164–446)
PMV BLD AUTO: 9.7 FL (ref 9–12.9)
POTASSIUM SERPL-SCNC: 4.7 MMOL/L (ref 3.6–5.5)
RBC # BLD AUTO: 2.65 M/UL (ref 4.7–6.1)
SODIUM SERPL-SCNC: 133 MMOL/L (ref 135–145)
WBC # BLD AUTO: 5.4 K/UL (ref 4.8–10.8)

## 2024-02-21 PROCEDURE — 770010 HCHG ROOM/CARE - REHAB SEMI PRIVAT*

## 2024-02-21 PROCEDURE — 90935 HEMODIALYSIS ONE EVALUATION: CPT | Performed by: INTERNAL MEDICINE

## 2024-02-21 PROCEDURE — 97530 THERAPEUTIC ACTIVITIES: CPT

## 2024-02-21 PROCEDURE — 90935 HEMODIALYSIS ONE EVALUATION: CPT

## 2024-02-21 PROCEDURE — 80048 BASIC METABOLIC PNL TOTAL CA: CPT

## 2024-02-21 PROCEDURE — 5A1D70Z PERFORMANCE OF URINARY FILTRATION, INTERMITTENT, LESS THAN 6 HOURS PER DAY: ICD-10-PCS | Performed by: INTERNAL MEDICINE

## 2024-02-21 PROCEDURE — 84100 ASSAY OF PHOSPHORUS: CPT

## 2024-02-21 PROCEDURE — 82040 ASSAY OF SERUM ALBUMIN: CPT

## 2024-02-21 PROCEDURE — 700111 HCHG RX REV CODE 636 W/ 250 OVERRIDE (IP): Mod: JZ | Performed by: INTERNAL MEDICINE

## 2024-02-21 PROCEDURE — 36415 COLL VENOUS BLD VENIPUNCTURE: CPT

## 2024-02-21 PROCEDURE — 99232 SBSQ HOSP IP/OBS MODERATE 35: CPT | Performed by: PHYSICAL MEDICINE & REHABILITATION

## 2024-02-21 PROCEDURE — 85027 COMPLETE CBC AUTOMATED: CPT

## 2024-02-21 PROCEDURE — A9270 NON-COVERED ITEM OR SERVICE: HCPCS | Performed by: INTERNAL MEDICINE

## 2024-02-21 PROCEDURE — 82962 GLUCOSE BLOOD TEST: CPT | Mod: 91

## 2024-02-21 PROCEDURE — 700102 HCHG RX REV CODE 250 W/ 637 OVERRIDE(OP): Performed by: PHYSICAL MEDICINE & REHABILITATION

## 2024-02-21 PROCEDURE — 97110 THERAPEUTIC EXERCISES: CPT

## 2024-02-21 PROCEDURE — 97535 SELF CARE MNGMENT TRAINING: CPT

## 2024-02-21 PROCEDURE — 700102 HCHG RX REV CODE 250 W/ 637 OVERRIDE(OP): Performed by: INTERNAL MEDICINE

## 2024-02-21 PROCEDURE — 700111 HCHG RX REV CODE 636 W/ 250 OVERRIDE (IP): Performed by: PHYSICAL MEDICINE & REHABILITATION

## 2024-02-21 PROCEDURE — A9270 NON-COVERED ITEM OR SERVICE: HCPCS | Performed by: PHYSICAL MEDICINE & REHABILITATION

## 2024-02-21 PROCEDURE — 700111 HCHG RX REV CODE 636 W/ 250 OVERRIDE (IP): Performed by: STUDENT IN AN ORGANIZED HEALTH CARE EDUCATION/TRAINING PROGRAM

## 2024-02-21 RX ORDER — INSULIN GLARGINE 100 [IU]/ML
15 INJECTION, SOLUTION SUBCUTANEOUS EVERY EVENING
Qty: 15 ML | Refills: 0 | Status: ACTIVE
Start: 2024-02-21 | End: 2024-02-27

## 2024-02-21 RX ORDER — ATORVASTATIN CALCIUM 80 MG/1
80 TABLET, FILM COATED ORAL EVERY EVENING
Qty: 30 TABLET | Refills: 2 | Status: SHIPPED | OUTPATIENT
Start: 2024-02-21

## 2024-02-21 RX ORDER — ASPIRIN 81 MG/1
81 TABLET ORAL DAILY
Qty: 30 TABLET | Refills: 2 | Status: SHIPPED | OUTPATIENT
Start: 2024-02-21

## 2024-02-21 RX ORDER — AMLODIPINE BESYLATE 2.5 MG/1
7.5 TABLET ORAL DAILY
Qty: 90 TABLET | Refills: 2 | Status: SHIPPED | OUTPATIENT
Start: 2024-02-22

## 2024-02-21 RX ORDER — TRAZODONE HYDROCHLORIDE 50 MG/1
50 TABLET ORAL
Qty: 30 TABLET | Refills: 0 | Status: SHIPPED | OUTPATIENT
Start: 2024-02-21

## 2024-02-21 RX ORDER — HEPARIN SODIUM 1000 [USP'U]/ML
2000 INJECTION, SOLUTION INTRAVENOUS; SUBCUTANEOUS
Status: DISCONTINUED | OUTPATIENT
Start: 2024-02-21 | End: 2024-02-22 | Stop reason: HOSPADM

## 2024-02-21 RX ORDER — LOSARTAN POTASSIUM 50 MG/1
50 TABLET ORAL DAILY
Qty: 30 TABLET | Refills: 2 | Status: SHIPPED | OUTPATIENT
Start: 2024-02-22

## 2024-02-21 RX ORDER — TORSEMIDE 100 MG/1
100 TABLET ORAL DAILY
Qty: 30 TABLET | Refills: 2 | Status: SHIPPED | OUTPATIENT
Start: 2024-02-22

## 2024-02-21 RX ADMIN — HEPARIN SODIUM 2000 UNITS: 1000 INJECTION, SOLUTION INTRAVENOUS; SUBCUTANEOUS at 14:52

## 2024-02-21 RX ADMIN — CHOLESTYRAMINE 4 G: 4 POWDER, FOR SUSPENSION ORAL at 11:22

## 2024-02-21 RX ADMIN — INSULIN HUMAN 4 UNITS: 100 INJECTION, SOLUTION PARENTERAL at 20:44

## 2024-02-21 RX ADMIN — TRAZODONE HYDROCHLORIDE 50 MG: 50 TABLET ORAL at 22:14

## 2024-02-21 RX ADMIN — Medication 1 TABLET: at 13:24

## 2024-02-21 RX ADMIN — LOSARTAN POTASSIUM 50 MG: 50 TABLET, FILM COATED ORAL at 05:10

## 2024-02-21 RX ADMIN — Medication 220 MG: at 13:24

## 2024-02-21 RX ADMIN — ATORVASTATIN CALCIUM 80 MG: 40 TABLET, FILM COATED ORAL at 20:35

## 2024-02-21 RX ADMIN — AMLODIPINE BESYLATE 7.5 MG: 5 TABLET ORAL at 05:10

## 2024-02-21 RX ADMIN — ASPIRIN 81 MG: 81 TABLET, COATED ORAL at 05:10

## 2024-02-21 RX ADMIN — EPOETIN ALFA-EPBX 8000 UNITS: 10000 INJECTION, SOLUTION INTRAVENOUS; SUBCUTANEOUS at 15:49

## 2024-02-21 RX ADMIN — OXYCODONE HYDROCHLORIDE AND ACETAMINOPHEN 500 MG: 500 TABLET ORAL at 18:02

## 2024-02-21 RX ADMIN — INSULIN GLARGINE-YFGN 15 UNITS: 100 INJECTION, SOLUTION SUBCUTANEOUS at 20:45

## 2024-02-21 RX ADMIN — HEPARIN SODIUM 3600 UNITS: 1000 INJECTION, SOLUTION INTRAVENOUS; SUBCUTANEOUS at 18:00

## 2024-02-21 RX ADMIN — HEPARIN SODIUM 5000 UNITS: 5000 INJECTION, SOLUTION INTRAVENOUS; SUBCUTANEOUS at 05:11

## 2024-02-21 RX ADMIN — CHOLESTYRAMINE 4 G: 4 POWDER, FOR SUSPENSION ORAL at 18:02

## 2024-02-21 RX ADMIN — OMEPRAZOLE 20 MG: 20 CAPSULE, DELAYED RELEASE ORAL at 08:06

## 2024-02-21 RX ADMIN — HEPARIN SODIUM 5000 UNITS: 5000 INJECTION, SOLUTION INTRAVENOUS; SUBCUTANEOUS at 13:24

## 2024-02-21 RX ADMIN — OXYCODONE HYDROCHLORIDE AND ACETAMINOPHEN 500 MG: 500 TABLET ORAL at 05:10

## 2024-02-21 RX ADMIN — TORSEMIDE 100 MG: 20 TABLET ORAL at 05:10

## 2024-02-21 RX ADMIN — HEPARIN SODIUM 5000 UNITS: 5000 INJECTION, SOLUTION INTRAVENOUS; SUBCUTANEOUS at 20:35

## 2024-02-21 ASSESSMENT — BRIEF INTERVIEW FOR MENTAL STATUS (BIMS)
WHAT YEAR IS IT: CORRECT
ASKED TO RECALL BED: YES, NO CUE REQUIRED
ASKED TO RECALL SOCK: YES, NO CUE REQUIRED
WHAT DAY OF THE WEEK IS IT: CORRECT
BIMS SUMMARY SCORE: 15
ASKED TO RECALL BLUE: YES, NO CUE REQUIRED
WHAT MONTH IS IT: ACCURATE WITHIN 5 DAYS
INITIAL REPETITION OF BED BLUE SOCK - FIRST ATTEMPT: 3

## 2024-02-21 ASSESSMENT — GAIT ASSESSMENTS: GAIT LEVEL OF ASSIST: UNABLE TO PARTICIPATE

## 2024-02-21 ASSESSMENT — PAIN DESCRIPTION - PAIN TYPE
TYPE: SURGICAL PAIN;ACUTE PAIN
TYPE: SURGICAL PAIN;ACUTE PAIN

## 2024-02-21 ASSESSMENT — ACTIVITIES OF DAILY LIVING (ADL)
BED_CHAIR_WHEELCHAIR_TRANSFER_DESCRIPTION: ADAPTIVE EQUIPMENT;SQUAT PIVOT TRANSFER TO WHEELCHAIR
SHOWER_TRANSFER_LEVEL_OF_ASSIST: REQUIRES SUPERVISION WITH SHOWER TRANSFER
TOILETING_LEVEL_OF_ASSIST: ABLE TO COMPLETE TOILETING WITHOUT ASSIST
TOILET_TRANSFER_LEVEL_OF_ASSIST: ABLE TO COMPLETE TOILET TRANSFER WITHOUT ASSIST

## 2024-02-21 NOTE — CARE PLAN
Problem: OT Long Term Goals  Goal: LTG-By discharge, patient will complete basic home management w/ supervision and LRAD  Outcome: Not Met     Problem: IADL's  Goal: STG-Within one week, patient will access kitchen area w/ min A and LRAD  Outcome: Progressing

## 2024-02-21 NOTE — DOCUMENTATION QUERY
[facility]                                                                       Query Response Note      PATIENT:               HELIO BARROW  ACCT #:                  [id number]  MRN:                     [id number]  :                      1970  ADMIT DATE:       2024 3:02 PM  DISCH DATE:        2024 11:58 AM  RESPONDING  PROVIDER #:        [id number]           QUERY TEXT:    There is conflicting documentation in the medical record.      CKD 3b is documented in Discharge Summary.    ESRD is documented a Consult and some Progress Notes.    Based on treatment, clinical findings and risk factors, can this documentation be further clarified?    The patient's Clinical Indicators include:  Clinical Indicators:  Documented in Consult, PNs - progression of CKD to ESRD; in Anesthesia note - ESRD not undergoing regularly scheduled dialysis.    GFR:  29 on , 23 on , 25 on , 21 on , 23 on , 28 on 2/3, 21 on , 23 on , 25 on , 16 on , 12 on , 10 on , 11 on , 10 on , 10 on  and .    Treatment:  Hemodialysis.  Amputation of L 5th ray metatarsal.  Amputation of L below knee.    Risk Factors:  Sepsis and septic shock.      Tamika Cochran HIM Coding [email or web address]  Options provided:   -- patient has CKD 3b   -- patient has ESRD   -- Other explanation, (please specify the other explanation)   -- Unable to determine      Query created by: Katie Cochran on 2024 1:46 AM    RESPONSE TEXT:    patient has ESRD          Electronically signed by:  HUGH RADFORD MD 2024 5:14 PM

## 2024-02-21 NOTE — PROGRESS NOTES
Physical Medicine & Rehabilitation Progress Note    Encounter Date: 2/21/2024    Chief Complaint: Doing well     Interval Events (Subjective):  Blood pressure in the 130s-140s  Voiding volitionally    Patient seen and examined in his room with his mom. He will be going home tomorrow. He is looking forward to discharge. Will be getting dialysis tonight. IV was taken out.       ROS: 14 point ROS negative unless otherwise specified in the HPI    Objective:  VITAL SIGNS: BP (!) 140/71   Pulse 83   Temp 37.1 °C (98.7 °F) (Oral)   Resp 18   Ht 1.829 m (6')   Wt 102 kg (225 lb)   SpO2 98%   BMI 30.52 kg/m²     GEN: No apparent distress  HEENT: Head normocephalic, atraumatic.  Sclera nonicteric bilaterally, no ocular discharge appreciated bilaterally.  CV: Right lower extremity with transmetatarsal amputations.  Swelling improved compared to prior.  PULMONARY: Breathing nonlabored on room air  PSYCH: Mood and affect within normal limits.  NEURO: Awake alert.  Conversational.  Logical thought content.      Skin: Left BKA 2/18        Laboratory Values:  Recent Results (from the past 72 hour(s))   POCT glucose device results    Collection Time: 02/18/24  5:25 PM   Result Value Ref Range    POC Glucose, Blood 152 (H) 65 - 99 mg/dL   POCT glucose device results    Collection Time: 02/18/24  8:34 PM   Result Value Ref Range    POC Glucose, Blood 147 (H) 65 - 99 mg/dL   Basic Metabolic Panel    Collection Time: 02/19/24  5:59 AM   Result Value Ref Range    Sodium 134 (L) 135 - 145 mmol/L    Potassium 4.6 3.6 - 5.5 mmol/L    Chloride 106 96 - 112 mmol/L    Co2 19 (L) 20 - 33 mmol/L    Glucose 163 (H) 65 - 99 mg/dL    Bun 60 (H) 8 - 22 mg/dL    Creatinine 3.05 (H) 0.50 - 1.40 mg/dL    Calcium 7.6 (L) 8.5 - 10.5 mg/dL    Anion Gap 9.0 7.0 - 16.0   CBC WITHOUT DIFFERENTIAL    Collection Time: 02/19/24  5:59 AM   Result Value Ref Range    WBC 5.4 4.8 - 10.8 K/uL    RBC 2.63 (L) 4.70 - 6.10 M/uL    Hemoglobin 7.7 (L) 14.0 -  18.0 g/dL    Hematocrit 23.6 (L) 42.0 - 52.0 %    MCV 89.7 81.4 - 97.8 fL    MCH 29.3 27.0 - 33.0 pg    MCHC 32.6 32.3 - 36.5 g/dL    RDW 49.8 35.9 - 50.0 fL    Platelet Count 232 164 - 446 K/uL    MPV 10.0 9.0 - 12.9 fL   ESTIMATED GFR    Collection Time: 02/19/24  5:59 AM   Result Value Ref Range    GFR (CKD-EPI) 23 (A) >60 mL/min/1.73 m 2   POCT glucose device results    Collection Time: 02/19/24  7:23 AM   Result Value Ref Range    POC Glucose, Blood 155 (H) 65 - 99 mg/dL   POCT glucose device results    Collection Time: 02/19/24 11:10 AM   Result Value Ref Range    POC Glucose, Blood 134 (H) 65 - 99 mg/dL   POCT glucose device results    Collection Time: 02/19/24  5:26 PM   Result Value Ref Range    POC Glucose, Blood 202 (H) 65 - 99 mg/dL   POCT glucose device results    Collection Time: 02/19/24  8:33 PM   Result Value Ref Range    POC Glucose, Blood 179 (H) 65 - 99 mg/dL   Basic Metabolic Panel    Collection Time: 02/20/24  6:01 AM   Result Value Ref Range    Sodium 132 (L) 135 - 145 mmol/L    Potassium 4.6 3.6 - 5.5 mmol/L    Chloride 104 96 - 112 mmol/L    Co2 19 (L) 20 - 33 mmol/L    Glucose 117 (H) 65 - 99 mg/dL    Bun 61 (H) 8 - 22 mg/dL    Creatinine 3.06 (H) 0.50 - 1.40 mg/dL    Calcium 7.9 (L) 8.5 - 10.5 mg/dL    Anion Gap 9.0 7.0 - 16.0   CBC WITHOUT DIFFERENTIAL    Collection Time: 02/20/24  6:01 AM   Result Value Ref Range    WBC 6.0 4.8 - 10.8 K/uL    RBC 2.91 (L) 4.70 - 6.10 M/uL    Hemoglobin 8.5 (L) 14.0 - 18.0 g/dL    Hematocrit 26.2 (L) 42.0 - 52.0 %    MCV 90.0 81.4 - 97.8 fL    MCH 29.2 27.0 - 33.0 pg    MCHC 32.4 32.3 - 36.5 g/dL    RDW 49.6 35.9 - 50.0 fL    Platelet Count 269 164 - 446 K/uL    MPV 9.9 9.0 - 12.9 fL   ESTIMATED GFR    Collection Time: 02/20/24  6:01 AM   Result Value Ref Range    GFR (CKD-EPI) 23 (A) >60 mL/min/1.73 m 2   POCT glucose device results    Collection Time: 02/20/24  8:08 AM   Result Value Ref Range    POC Glucose, Blood 114 (H) 65 - 99 mg/dL   POCT glucose  device results    Collection Time: 02/20/24 11:11 AM   Result Value Ref Range    POC Glucose, Blood 139 (H) 65 - 99 mg/dL   POCT glucose device results    Collection Time: 02/20/24  5:17 PM   Result Value Ref Range    POC Glucose, Blood 198 (H) 65 - 99 mg/dL   POCT glucose device results    Collection Time: 02/20/24  8:24 PM   Result Value Ref Range    POC Glucose, Blood 213 (H) 65 - 99 mg/dL   Basic Metabolic Panel    Collection Time: 02/21/24  5:47 AM   Result Value Ref Range    Sodium 133 (L) 135 - 145 mmol/L    Potassium 4.7 3.6 - 5.5 mmol/L    Chloride 105 96 - 112 mmol/L    Co2 18 (L) 20 - 33 mmol/L    Glucose 139 (H) 65 - 99 mg/dL    Bun 64 (H) 8 - 22 mg/dL    Creatinine 3.24 (H) 0.50 - 1.40 mg/dL    Calcium 7.4 (L) 8.5 - 10.5 mg/dL    Anion Gap 10.0 7.0 - 16.0   CBC WITHOUT DIFFERENTIAL    Collection Time: 02/21/24  5:47 AM   Result Value Ref Range    WBC 5.4 4.8 - 10.8 K/uL    RBC 2.65 (L) 4.70 - 6.10 M/uL    Hemoglobin 7.9 (L) 14.0 - 18.0 g/dL    Hematocrit 23.6 (L) 42.0 - 52.0 %    MCV 89.1 81.4 - 97.8 fL    MCH 29.8 27.0 - 33.0 pg    MCHC 33.5 32.3 - 36.5 g/dL    RDW 49.1 35.9 - 50.0 fL    Platelet Count 222 164 - 446 K/uL    MPV 9.7 9.0 - 12.9 fL   PHOSPHORUS    Collection Time: 02/21/24  5:47 AM   Result Value Ref Range    Phosphorus 3.1 2.5 - 4.5 mg/dL   ALBUMIN    Collection Time: 02/21/24  5:47 AM   Result Value Ref Range    Albumin 2.4 (L) 3.2 - 4.9 g/dL   ESTIMATED GFR    Collection Time: 02/21/24  5:47 AM   Result Value Ref Range    GFR (CKD-EPI) 22 (A) >60 mL/min/1.73 m 2   POCT glucose device results    Collection Time: 02/21/24  7:53 AM   Result Value Ref Range    POC Glucose, Blood 103 (H) 65 - 99 mg/dL   POCT glucose device results    Collection Time: 02/21/24 11:23 AM   Result Value Ref Range    POC Glucose, Blood 122 (H) 65 - 99 mg/dL       Medications:  Scheduled Medications   Medication Dose Frequency    epoetin  8,000 Units MO, WE + FR    torsemide  100 mg Q DAY    losartan  50 mg Q DAY     amLODIPine  7.5 mg Q DAY    cholestyramine  1 Packet BID    therapeutic multivitamin-minerals  1 Tablet DAILY WITH LUNCH    zinc sulfate  220 mg DAILY WITH LUNCH    ascorbic acid  500 mg BID WITH MEALS    aspirin  81 mg DAILY    atorvastatin  80 mg Q EVENING    heparin  5,000 Units Q8HRS    insulin GLARGINE  15 Units Q EVENING    insulin regular  3-14 Units 4X/DAY ACHS    omeprazole  20 mg QAM    Pharmacy Consult Request  1 Each PHARMACY TO DOSE     PRN medications: NS, senna-docusate **AND** polyethylene glycol/lytes **AND** bisacodyl, Respiratory Therapy Consult, hydrALAZINE, carboxymethylcellulose, benzocaine-menthol, mag hydrox-al hydrox-simeth, ondansetron **OR** ondansetron, traZODone, sodium chloride, insulin regular **AND** POC blood glucose manual result **AND** NOTIFY MD and PharmD **AND** Administer 20 grams of glucose (approximately 8 ounces of fruit juice) every 15 minutes PRN FSBG less than 70 mg/dL **AND** dextrose bolus, acetaminophen, heparin, lidocaine PF, oxyCODONE immediate-release **OR** oxyCODONE immediate-release    Diet:  Current Diet Order   Procedures    Diet Order Diet: Consistent CHO (Diabetic); Second Modifier: (optional): Renal       Medical Decision Making and Plan:  Left BKA secondary to acute on chronic osteomyelitis in setting of diabetes mellitus  Osteomyelitis resolved with infection control with BKA and s/p IV antibiotics  PT and OT for mobility and ADLs. Per guidelines, 15 hours per week between PT, OT and/or SLP.  Follow-up orthopedics    2/20 pictures reviewed from 2/18, no concerns with residual limb healing     Acute blood loss anemia  Postoperative and CKD, required 1 unit PRBC  Hemoglobin stable 2/21 at 7.9  CBC 3 times weekly Monday, Wednesday, Friday April 18 scheduled Monday Wednesday Friday  Nephrology following     Acute renal failure stage IIIb CKD on HD  S/p right IJ tunneled cath placement 1/29/2024  Hemodialysis Monday, Wednesday, Friday 2/12 HD on HOLD.  Hoping for renal recovery.   2/21 intermittent dialysis 1 time weekly, next scheduled for today  Nephrology following     Type 2 diabetes mellitus with hyperglycemia, insulin-dependent  Long-term complications causing CKD, peripheral neuropathy, neurogenic bowel  Continue long-acting and short acting insulin  At home had been on Lantus 36 at night with SSI     Hypocalcemia  Stop supplementation 2/20 per nephrology    Hyperphosphatemia  Resolved on admission 2.7    Hyponatremia  Patient has specific diet for his GI system  2/21 1.5 L fluid restriction per nephrology.  Avoid salt tab.  Nephrology following    Elevated TSH  Normal free T4  Continue to monitor     History of CAD s/p ARTURO 9/22/2019  Last cardiology note 7/7/2023: On atorvastatin 80 mg, aspirin 81 mg, carvedilol 3.125 mg twice daily, Jardiance 25 mg daily  Continue aspirin 81 mg daily and statin 80 mg nightly     Hypertension  Peripheral edema  2/21: Continue amlodipine 7.5 mg daily + losartan 50 mg daily (started by nephrology 2/21) + torsemide 100 mg daily (started by nephrology 2/21)     Pain  Currently not reporting pain  As needed Tylenol  As needed oxycodone     Skin  Patient at risk for skin breakdown due to debility in areas including sacrum, achilles, elbows and head in addition to other sites. Nursing to assess skin daily.      GI Ppx  Patient on Prilosec for GERD prophylaxis.      Bowel   Neurogenic bowel secondary to long-term complications from diabetes  Colestyramine at 10 AM and 5 PM  Follows with GI  Patient on Senna-docusate for constipation prophylaxis.   Abnormal frequency of bowel movements at baseline.  Can go 5 to 7 days.     Bladder  TV/PVR/BS PRN  PVRs in 200's, likely positional. Monitor    Left Humeral Fracture 12/2023 s/p ORIF 12/11/23 Dr. Aris Pirere  D/w Dr. Pierre 2/14/2024 and is WBAT with ROM as tolerated L shoulder/arm     Upcoming Labs/imaging: Daily BMP and CBC 6 while in-house     DVT PROPHYLAXIS: Heparin 5000 units  q8 hrs     HOSPITALIST FOLLOWING: No. Nephrology following. D/w nephrology via [facility] 2/20/2024       CODE STATUS: FULL CODE     DISPO: Home with supportive parents.      LISE: 2/22/24     ADDITIONAL MEDICAL NEEDS ON D/C (IV abx, O2, etc): None     MEDS SENT TO: M2B Eligible.  Will send to meds to bed.     DISCHARGE SPECIALIST FOLLOW UP: Orthopedics ([facility] [place]), nephrology     Patient to scheduled follow up with their PCP within 2 weeks from discharge from the [facility].      ____________________________________    Dr. Mena Ny DO, MS  [facility] - Physical Medicine & Rehabilitation   ____________________________________    _____________________________________  Interdisciplinary Team Conference   Most recent IDT on 2/21/2024    I, Dr. Mena Ny DO, MS, was present and led the interdisciplinary team conference on 2/21/2024.  I led the IDT conference and agree with the IDT conference documentation and plan of care as noted below.     Nursing:  Diet Current Diet Order   Procedures    Diet Order Diet: Consistent CHO (Diabetic); Second Modifier: (optional): Renal       Eating ADL Supervision  Increased time, Set-up of equipment or meal/tube feeding, Supervision for safety   % of Last Meal  Oral Nutrition: Breakfast, Between % Consumed   Sleep    Bowel Last BM: 02/19/24   Bladder        Physical Therapy:  Bed Mobility    Transfers Modified Independent  Squat pivot transfer to wheelchair (w/c <> shuttle for leg press)   Mobility Unable to Participate   Stairs    Has met STG and LTG  Able to do car transfers  CGA for upright activities   Min to Mod floor recovery     Occupational Therapy:  Grooming Modified Independent, Seated   Bathing Standby Assist (set-up assist; distant sup for seated shower)   UB Dressing Modified Independent (to retrieve, don, doff shirt @ w/c level)   LB Dressing Supervision (to don R sock seated in w/c)   Toileting Moderate Assist   Shower & Transfer         Respiratory Therapy:  O2 (LPM): 0  O2 Delivery Device: None - Room Air    Case Management:  Continues to work on disposition and DME needs.     BARRIERS TO DISCHARGE HOME:  No barriers to discharge  Will be getting intermittent dialysis      Discharge Date/Disposition:  2/22/24  _____________________________________    Total time:  36 minutes. Time spent included pre-rounding review of vitals and tests, unit/floor time, face-to-face time with the patient including physical examination, care coordination, counseling of patient and/or family, ordering medications/procedures/tests, discussion with CM, PT, OT, SLP and/or other healthcare providers, and documentation in the electronic medical record.

## 2024-02-21 NOTE — THERAPY
"Occupational Therapy  Daily Treatment     Patient Name: Beni Moore  Age:  53 y.o., Sex:  male  Medical Record #: [id number]  Today's Date: 2/21/2024     Precautions  Precautions: Fall Risk, Non Weight Bearing Left Lower Extremity, Immobilizer Left Lower Extremity  Comments: L BKA, WBAT on L UE s/p ORIF         Subjective  Pt seated upon arrival, agreeable to OT session.      Objective     02/21/24 1401   OT Charge Group   OT Therapeutic Exercise (Units) 2   OT Total Time Spent   OT Individual Total Time Spent (Mins) 30   Interdisciplinary Plan of Care Collaboration   IDT Collaboration with  Family / Caregiver   Patient Position at End of Therapy Seated   Collaboration Comments Mother present and engaged throughout session   Cognitive Pattern Assessment   Cognitive Pattern Assessment Used BIMS   Brief Interview for Mental Status (BIMS)   Repetition of Three Words (First Attempt) 3   Temporal Orientation: Year Correct   Temporal Orientation: Month Accurate within 5 days   Temporal Orientation: Day Correct   Recall: \"Sock\" Yes, no cue required   Recall: \"Blue\" Yes, no cue required   Recall: \"Bed\" Yes, no cue required   BIMS Summary Score 15   Confusion Assessment Method (CAM)   Is there evidence of an acute change in mental status from the patient's baseline? No   Inattention Behavior not present   Disorganized thinking Behavior not present   Altered level of consciousness Behavior not present     Passport to [place] completed for final discussion re: progression in therapies, CLOF, and ADL performance at home. Pt demonstrates ability to complete shower/shower txfr with supervision, but can complete all other ADLs @ mod I level. He provides successful return demo of UE pendulum, isometrics, and stretching exercises.     Completed 3x car txfrs w/c <> Shantanu with SBA-CGA.     Patient completed light rotator cuff strengthening exercise with rolling ball onto horizontal wall with shoulder flexion ~40 " degrees    Assessment  Patient had good tolerance to session and has made excellent progress towards his goals for d/c.   Strengths: Able to follow instructions, Alert and oriented, Effective communication skills, Good endurance, Good insight into deficits/needs, Independent prior level of function, Motivated for self care and independence, Pleasant and cooperative, Supportive family, Willingly participates in therapeutic activities  Barriers: Decreased endurance, Fatigue, Generalized weakness, Impaired activity tolerance, Impaired balance, Limited mobility    Plan  Anticipate d/c to home tomorrow     DME  OT DME Recommendations  Bathroom Equipment:  (Pt has acquired necessary DME for bathroom to support safety and independence with ADLs.)      Occupational Therapy Goals (Active)       Problem: IADL's       Dates: Start:  02/09/24         Goal: STG-Within one week, patient will access kitchen area w/ min A and LRAD       Dates: Start:  02/09/24               Problem: OT Long Term Goals       Dates: Start:  02/09/24         Goal: LTG-By discharge, patient will complete basic home management w/ supervision and LRAD       Dates: Start:  02/09/24

## 2024-02-21 NOTE — CARE PLAN
Problem: Knowledge Deficit - Standard  Goal: Patient and family/care givers will demonstrate understanding of plan of care, disease process/condition, diagnostic tests and medications  Outcome: Progressing     Problem: Skin Integrity  Goal: Skin integrity is maintained or improved  Outcome: Progressing   The patient is Stable - Low risk of patient condition declining or worsening    Shift Goals  Clinical Goals: Safety  Patient Goals: Safety, sleep well

## 2024-02-21 NOTE — DISCHARGE PLANNING
Case Management/IDT follow up.   IDT continues to recommend IRF level of care as patient continue to make progress with all therapies.   date set for Thursday 2/22      DC needs: Referral made to [facility] for PT/OT/RN; pt purchased all of his own dme; follow up with pcp/nephrology/orth     Arrangements for weekly dialysis confirmed w/ Madeleine IVY  /Dialysis Coordinator; date still to be confirmed.     Disabled parking permit application completed and faxed to [employer]    Met with patient providing update from IDT and discussed plan of care.  Reviewed signed copy of IMM and answered all questions.    Dc date /disposition: 2/22 w/ home health; pt will stay @ his parent's home in [place] .

## 2024-02-21 NOTE — DISCHARGE PLANNING
JUAN  Tc to Xitlaly Reyes   Dialysis Coordinator / [facility]   Ph: [phone]  She confirms a chiar is available @ [facility] [address] [address]    She will confirm weekly date of dialysis.   I have updated pt.

## 2024-02-21 NOTE — CARE PLAN
Problem: IADL's  Goal: STG-Within one week, patient will access kitchen area w/ min A and LRAD  Outcome: Progressing     Problem: Bathing  Goal: STG-Within one week, patient will bathe w/ CGA and AE/DME as needed  Outcome: Met     Problem: Dressing  Goal: STG-Within one week, patient will dress LB w/ min A and AE as needed  Outcome: Met     Problem: Toileting  Goal: STG-Within one week, patient will complete toileting tasks w/ min A and AE/DME as needed  Outcome: Met

## 2024-02-21 NOTE — THERAPY
Occupational Therapy   Discharge Summary     Patient Name: Beni Moore  Age:  53 y.o., Sex:  male  Medical Record #: [id number]  Today's Date: 2/21/2024     Precautions  Precautions: Fall Risk, Non Weight Bearing Left Lower Extremity, Immobilizer Left Lower Extremity  Comments: L BKA, WBAT on L UE s/p ORIF         Subjective  Pt retrieved in w/c, pleasant and agreeable to OT session. Reports that he is planning to purchase a reusable shower sleeve for use at home.      Objective     02/21/24 0701   OT Charge Group   OT Self Care / ADL (Units) 4   OT Total Time Spent   OT Individual Total Time Spent (Mins) 60   Interdisciplinary Plan of Care Collaboration   Patient Position at End of Therapy Seated  (in dining room for breakfast)   Eating   Assistance Needed Independent   CARE Score - Eating 6   Oral Hygiene   Assistance Needed Independent;Adaptive equipment   CARE Score - Oral Hygiene 6   Toileting Hygiene   Assistance Needed Independent;Adaptive equipment   CARE Score - Toileting Hygiene 6   Shower/Bathe Self   Assistance Needed Adaptive equipment;Independent   CARE Score - Shower/Bathe Self 6   Upper Body Dressing   Assistance Needed Independent;Adaptive equipment   CARE Score - Upper Body Dressing 6   Lower Body Dressing   Assistance Needed Independent;Adaptive equipment   CARE Score - Lower Body Dressing 6   Putting On/Taking Off Footwear   Assistance Needed Independent;Adaptive equipment   CARE Score - Putting On/Taking Off Footwear 6   Toilet Transfer   Assistance Needed Independent;Adaptive equipment   CARE Score - Toilet Transfer 6   Discharge Summary    Discharge Location  Relative / Friend's Home  (parent's home)   Patient Discharging with Assist of Family    Level of Supervision Required Intermittent Supervision   Recommended Equipment for Discharge Grab Bars in Tub / Shower;Hand Held Shower Head;Grab Bars by Toilet;3 in 1 Commode;Tub Transfer Bench   Recommended Services Upon Discharge Home Health  Occupational Therapy   Long Term Goals Met 1   Long Term Goals Not Met 1   Reason(s) for Goals Not Met Pt continues to require physical assist for IADLs/home management. He can perform seated IADLs/home management @ mod I level.   Criteria for Termination of Services Maximum Function Achieved for Inpatient Rehabilitation   Discharge Instructions to Patient   Level of Assist Required for Eating Able to Complete Eating without Assist   Level of Assist Required for Grooming Able to Complete Grooming without Assist   Level of Assist Required for Dressing Able to Complete Dressing without Assist   Level of Assist Required for Toileting Able to Complete Toileting without Assist   Level of Assist Required for Toilet Transfer Able to Complete Toilet Transfer without Assist   Equipment for Toilet Transfer Commode over Toilet;Grab Bars by Toilet   Level of Assist Required for Bathing Requires Supervision with Bathing   Equipment for Bathing Tub Transfer Bench;Grab Bars in Tub / Shower;Hand Held Shower Head   Level of Assist Required for Shower Transfer Requires Supervision with Shower Transfer   Equipment for Shower Transfer Grab Bars in Tub / Shower;Tub Transfer Bench   Level of Assist Required for Home Mgmt Requires Physical Assist with Home Management   Level of Assist Required for Meal Prep Requires Physical Assist with Meal Preparation   Driving Please Contact Physician Prior to Driving   Home Exercise Program None Issued  (Continue UE isometrics, ROM/stretch, pendulum exercises)         Assessment  Patient has made good progress towards his goals for d/c. He will benefit from supervision for shower and shower txfr but demonstrates excellent ability to direct his care and perform other ADLs safely @ mod I level.   Strengths: Able to follow instructions, Alert and oriented, Effective communication skills, Good endurance, Good insight into deficits/needs, Independent prior level of function, Motivated for self care and  independence, Pleasant and cooperative, Supportive family, Willingly participates in therapeutic activities  Barriers: Decreased endurance, Fatigue, Generalized weakness, Impaired activity tolerance, Impaired balance, Limited mobility    Plan  Anticipate d/c to home tomorrow     Passport items to be completed:  Perform bathroom transfers, complete dressing, complete feeding, get ready for the day, prepare a simple meal, participate in household tasks, adapt home for safety needs, demonstrate home exercise program, complete caregiver training     Occupational Therapy Goals (Active)       Problem: IADL's       Dates: Start:  02/09/24         Goal: STG-Within one week, patient will access kitchen area w/ min A and LRAD       Dates: Start:  02/09/24               Problem: OT Long Term Goals       Dates: Start:  02/09/24         Goal: LTG-By discharge, patient will complete basic home management w/ supervision and LRAD       Dates: Start:  02/09/24

## 2024-02-21 NOTE — THERAPY
Physical Therapy   Daily Treatment     Patient Name: Beni Moore  Age:  53 y.o., Sex:  male  Medical Record #: [id number]  Today's Date: 2/21/2024     Precautions  Precautions: (P) Fall Risk, Non Weight Bearing Left Lower Extremity, Immobilizer Left Lower Extremity  Comments: (P) L BKA, WBAT on L UE s/p ORIF    Subjective    Pt felt prepared for D/C tomorrow, but was agreeable to outdoor wc mobility training.     Objective       02/21/24 1001   PT Charge Group   PT Therapeutic Activities (Units) 2   PT Total Time Spent   PT Individual Total Time Spent (Mins) 30   Precautions   Precautions Fall Risk;Non Weight Bearing Left Lower Extremity;Immobilizer Left Lower Extremity   Comments L BKA, WBAT on L UE s/p ORIF   Wheelchair Functional Level of Assist   Wheelchair Assist Modified Independent   Distance Wheelchair (Feet or Distance) 200  (2x, indoor/ outdoor over unlevel surfaces including self propulsion bwd vs fwd up sloped surfaces)   Wheelchair Description Extra time  (limited by LUE tolerance)   Neuro-Muscular Treatments   Comments Discussion regarding healing/ time for prosthetic   Interdisciplinary Plan of Care Collaboration   IDT Collaboration with  Therapy Tech   Patient Position at End of Therapy Seated   Collaboration Comments Pt declined attending diabetes education class post session. Gong ring to celebrate final day in therapy and upcoming D/C.         Assessment    Pt participated in outdoor wc mobility over uneven surfaces. Pt demonstrated a good understanding of D/C expectations and readiness for D/C.     Strengths: Able to follow instructions, Alert and oriented, Effective communication skills, Good carryover of learning, Good insight into deficits/needs, Independent prior level of function, Motivated for self care and independence, Pleasant and cooperative, Supportive family, Willingly participates in therapeutic activities  Barriers: Fatigue, Generalized weakness, Impaired activity tolerance,  Impaired balance, Pain    Plan    D/C home tomorrow with home health.    DME  PT DME Recommendations  Wheelchair:  (Pt already has his own WC and slideboard)  Assistive Device: Front Wheeled Walker        Physical Therapy Problems (Active)       There are no active problems.

## 2024-02-21 NOTE — PROGRESS NOTES
NURSING DAILY NOTE    Name: Beni Moore   Date of Admission: 2/8/2024   Admitting Diagnosis: Hx of BKA, left (HCC)  Attending Physician: Mena Ny D.o.  Allergies: Patient has no known allergies.    Safety  Patient Assist  sba at night, independent during daytime hours  Patient Precautions  Fall Risk, Non Weight Bearing Left Lower Extremity, Immobilizer Left Lower Extremity  Precaution Comments  L BKA, WBAT on L UE s/p ORIF  Bed Transfer Status  Modified Independent  Toilet Transfer Status   Standby Assist (w/c <> bariatric drop arm commode lateral scoot)  Assistive Devices  Wheelchair  Oxygen  None - Room Air  Diet/Therapeutic Dining  Current Diet Order   Procedures    Diet Order Diet: Consistent CHO (Diabetic); Second Modifier: (optional): Renal     Pill Administration  whole  Agitated Behavioral Scale  17  ABS Level of Severity  No Agitation    Fall Risk  Has the patient had a fall this admission?   No  Florecita Roldan Fall Risk Scoring  22, HIGH RISK  Fall Risk Safety Measures  poor balance, Pt is modified independent in room     Vitals  Temperature: 36.5 °C (97.7 °F)  Temp src: Oral  Pulse: 80  Respiration: 18  Blood Pressure: (!) 150/80 (Rn notified )  Blood Pressure MAP (Calculated): 103 MM HG  BP Location: Left, Upper Arm  Patient BP Position: Sitting     Oxygen  Pulse Oximetry: 95 %  O2 (LPM): 0  O2 Delivery Device: None - Room Air    Bowel and Bladder  Last Bowel Movement  02/19/24  Stool Type  Type 3: Like a sausage, but with cracks on its surface  Bowel Device  Bathroom  Continent  Bladder: Continent void   Bowel: Continent movement  Bladder Function  Urine Void (mL): 500 ml  Number of Times Voided: 1  Urine Color: Yellow  Genitourinary Assessment   Bladder Assessment (WDL):  Within Defined Limits  Farrell Catheter: Not Applicable  Urinary Elimination:  (able to void)  Urine Color: Yellow  Bladder Device: Urinal  Time Void: Yes  Bladder  Scan: Post Void  $ Bladder Scan Results (mL): 78    Skin  Johnny Score   18  Sensory Interventions   Bed Types: Standard/Trauma Mattress with Overlay  Skin Preventative Measures: Pillows in Use for Support / Positioning, Waffle Overlay  Moisture Interventions  Moisturizers/Barriers: Barrier Wipes      Pain  Pain Rating Scale  0 - No Pain  Pain Location  Leg  Pain Location Orientation  Left  Pain Interventions   Declines    ADLs    Bathing   Shower, Staff (pt shower by ot)  Linen Change   Partial  Personal Hygiene     Chlorhexidine Bath      Oral Care  Brushed Teeth (self)  Teeth/Dentures     Shave  Self  Nutrition Percentage Eaten  Breakfast, Between % Consumed  Environmental Precautions  Treaded Slipper Socks on Patient, Personal Belongings, Wastebasket, Call Bell etc. in Easy Reach, Bed in Low Position  Patient Turns/Positioning  Sitting Up in Wheelchair  Patient Turns Assistance/Tolerance  Standby Assist  Bed Positions  Bed Controls On, Bed Locked  Head of Bed Elevated  Self regulated      Psychosocial/Neurologic Assessment  Psychosocial Assessment  Psychosocial (WDL):  Within Defined Limits  Neurologic Assessment  Neuro (WDL): Exceptions to WDL  Level of Consciousness: Alert  Orientation Level: Oriented X4  Cognition: Follows commands, Appropriate attention/concentration  Speech: Clear  Motor Function/Sensation Assessment: Motor strength  Muscle Strength Right Arm: Good Strength Against Gravity and Moderate Resistance  Muscle Strength Left Arm: Good Strength Against Gravity and Moderate Resistance  Muscle Strength Right Leg: Fair Strength against Gravity but No Resistance  Muscle Strength Left Leg:  (BKA)  EENT (WDL):  WDL Except    Cardio/Pulmonary Assessment  Edema   LLE Edema: 1+  Respiratory Breath Sounds  RUL Breath Sounds: Clear  RML Breath Sounds: Clear  RLL Breath Sounds: Clear  CHRIS Breath Sounds: Clear  LLL Breath Sounds: Clear  Cardiac Assessment   Cardiac (WDL):  WDL Except (hx of htn)

## 2024-02-21 NOTE — PROGRESS NOTES
NURSING DAILY NOTE    Name: Beni Moore   Date of Admission: 2/8/2024   Admitting Diagnosis: Hx of BKA, left (HCC)  Attending Physician: Mena Ny D.o.  Allergies: Patient has no known allergies.    Safety  Patient Assist  sba at night, independent during daytime hours  Patient Precautions  Fall Risk, Non Weight Bearing Left Lower Extremity, Immobilizer Left Lower Extremity  Precaution Comments  L BKA, WBAT on L UE s/p ORIF  Bed Transfer Status  Modified Independent  Toilet Transfer Status   Standby Assist (w/c <> bariatric drop arm commode lateral scoot)  Assistive Devices  Wheelchair  Oxygen  None - Room Air  Diet/Therapeutic Dining  Current Diet Order   Procedures    Diet Order Diet: Consistent CHO (Diabetic); Second Modifier: (optional): Renal     Pill Administration  whole  Agitated Behavioral Scale  17  ABS Level of Severity  No Agitation    Fall Risk  Has the patient had a fall this admission?   No  Florecita Roldan Fall Risk Scoring  22, HIGH RISK  Fall Risk Safety Measures  bed alarm, chair alarm, poor balance, and low vision/ hearing    Vitals  Temperature: 36.5 °C (97.7 °F)  Temp src: Temporal  Pulse: 80  Respiration: 18  Blood Pressure: (!) 141/74  Blood Pressure MAP (Calculated): 96 MM HG  BP Location: Left, Upper Arm  Patient BP Position: Mazariegos's Position     Oxygen  Pulse Oximetry: 98 %  O2 (LPM): 0  O2 Delivery Device: None - Room Air    Bowel and Bladder  Last Bowel Movement  02/19/24  Stool Type  Type 3: Like a sausage, but with cracks on its surface  Bowel Device  Bathroom  Continent  Bladder: Continent void   Bowel: Continent movement  Bladder Function  Urine Void (mL): 450 ml  Number of Times Voided: 1  Urine Color: Yellow  Genitourinary Assessment   Bladder Assessment (WDL):  Within Defined Limits  Farrell Catheter: Not Applicable  Urinary Elimination:  (able to void)  Urine Color: Yellow  Bladder Device: Urinal  Time Void:  Yes  Bladder Scan: Post Void  $ Bladder Scan Results (mL): 78    Skin  Johnny Score   16  Sensory Interventions   Bed Types: Standard/Trauma Mattress  Skin Preventative Measures: Pillows in Use for Support / Positioning  Moisture Interventions  Moisturizers/Barriers: Barrier Wipes      Pain  Pain Rating Scale  1 - Hardly Notice Pain  Pain Location  Leg  Pain Location Orientation  Left  Pain Interventions   Declines    ADLs    Bathing   Shower, Staff (pt shower by ot)  Linen Change   Partial  Personal Hygiene     Chlorhexidine Bath      Oral Care  Brushed Teeth (self)  Teeth/Dentures     Shave  Self  Nutrition Percentage Eaten  Breakfast, Between % Consumed  Environmental Precautions  Treaded Slipper Socks on Patient, Personal Belongings, Wastebasket, Call Bell etc. in Easy Reach, Bed in Low Position  Patient Turns/Positioning  Patient Turns Self from Side to Side  Patient Turns Assistance/Tolerance  Standby Assist  Bed Positions  Bed Controls On, Bed Locked  Head of Bed Elevated  Self regulated      Psychosocial/Neurologic Assessment  Psychosocial Assessment  Psychosocial (WDL):  Within Defined Limits  Neurologic Assessment  Neuro (WDL): Exceptions to WDL  Level of Consciousness: Alert  Orientation Level: Oriented X4  Cognition: Follows commands, Appropriate attention/concentration  Speech: Clear  Motor Function/Sensation Assessment: Motor strength  Muscle Strength Right Arm: Good Strength Against Gravity and Moderate Resistance  Muscle Strength Left Arm: Good Strength Against Gravity and Moderate Resistance  Muscle Strength Right Leg: Fair Strength against Gravity but No Resistance  Muscle Strength Left Leg:  (BKA)  EENT (WDL):  WDL Except    Cardio/Pulmonary Assessment  Edema   LLE Edema: 1+  Respiratory Breath Sounds  RUL Breath Sounds: Clear  RML Breath Sounds: Clear  RLL Breath Sounds: Clear  CHRIS Breath Sounds: Clear  LLL Breath Sounds: Clear  Cardiac Assessment   Cardiac (WDL):  WDL Except (HTN,  CKD)

## 2024-02-21 NOTE — DISCHARGE INSTRUCTIONS
[facility] NURSING DISCHARGE INSTRUCTIONS    Blood Pressure: 134/69  Weight: 102 kg (225 lb)  Nursing recommendations for Beni Moore at time of discharge are as follows:  Client and Family Member verbalized understanding of all discharge instructions and prescriptions.     Review all your home medications and newly ordered medications with your doctor and/or pharmacist. Follow medication instructions as directed by your doctor and/or pharmacist.    Pain Management:   Discharge Pain Medication Instructions:  Comfort Goal: 0, Comfort with Movement, Sleep Comfortably  Notify your primary care provider if pain is unrelieved with these measures, if the pain is new, or increased in intensity.    Discharge Skin Characteristics: Warm, Dry  Discharge Skin Exam: Clear (Left BKA with sutures and immobilizer.)  Wound 02/01/24 Other (comment) Closed Incision Leg Lower Left BKA (Active)   Wound Image   02/21/24 0806   Site Assessment JENNIFER 02/21/24 2056   Periwound Assessment JENNIFER 02/21/24 2056   Margins Attached edges 02/21/24 0806   Closure Approximated;Sutures;Secondary intention 02/21/24 0806   Drainage Amount Scant 02/21/24 0806   Drainage Description Serosanguineous 02/21/24 0806   Treatments Cleansed;Site care 02/21/24 0806   Wound Cleansing Approved Wound Cleanser 02/21/24 0806   Periwound Protectant Not Applicable 02/21/24 0806   Dressing Status Clean;Dry;Intact 02/21/24 2056   Dressing Changed Observed 02/21/24 2056   Dressing Options Petrolatum Gauze (yellow);Telfa;Dry Roll Gauze;Ace Wrap;Immobilizer 02/21/24 0806   Dressing Change/Treatment Frequency Daily, and As Needed 02/21/24 2056   NEXT Dressing Change/Treatment Date 02/15/24 02/14/24 1500   NEXT Weekly Photo (Inpatient Only) 02/19/24 02/14/24 1900     Skin / Wound Care Instructions: Please contact your primary care physician for any change in skin integrity. Swelling, redness, drainage, tenderness/pain and fever.     If You Have  Surgical Incisions / Wounds:  Monitor surgical site(s) for signs of increased swelling, redness or symptoms of drainage from the site or fever as this could indicate signs and symptoms of infection. If these symptoms are noted, notifiy your primary care provider.      Discharge Safety Instructions:       Discharge Safety Concerns: Weakness  The interdisciplinary team has made recommendation that you do not require supervision in the house due to weakness  Anti-embolic stockings are required during the day and off at night to increase circulation to the lower extremities.    Discharge Diet: Consistent CHO with second modifier Renal     Discharge Liquids: thin  Discharge Bowel Function: Continent  Please contact your primary care physician for any changes in bowel habits.  Discharge Bowel Program:    Discharge Bladder Function: Continent  Discharge Urinary Devices: None      Nursing Discharge Plan:   Influenza Vaccine Indication: Patient Refuses    Case Management Discharge Instructions:   Discharge Location: Home with Home Health  Agency Name/Address/Phone: [facility]  Home Health: Registered Nurse, Occupational Therapist, Physical Therapist  Outpatient Services:    DME Provider/Phone: [employer]  Medical Equipment Ordered: Other   Prescription Faxed to: for prosthetic services      Discharge Medication Instructions:  Below are the medications your physician expects you to take upon discharge:    Leg Amputation              Leg amputation is a procedure to remove the leg. You may have this procedure if your leg has been affected by a disease, such as peripheral vascular disease or severe circulation disease. You may also need this procedure if you have tumors or a traumatic injury and the leg is causing problems. There are three types of leg amputation:  Above-the-knee amputation (transfemoral amputation). This type is done to remove the leg from above the knee.  Below-the-knee amputation (transtibial  amputation). This type is done to remove the leg from below the knee.  Through-knee amputation. This type is done to remove the leg at the knee joint.  Tell a health care provider about:  Any allergies you have.  All medicines you are taking, including vitamins, herbs, eye drops, creams, and over-the-counter medicines.  Any problems you or family members have had with anesthetic medicines.  Any bleeding problems you have.  Any surgeries you have had.  Any medical conditions you have.  Whether you are pregnant or may be pregnant.  What are the risks?  Generally, this is a safe procedure. However, problems may occur, including:  Infection.  Bleeding.  Allergic reactions to medicines.  Stiffening of the hip and knee joint (hip and knee contracture).  Blood clot in a deep vein (deep vein thrombosis, or DVT), usually in the leg.  A blood clot in your lung (pulmonary embolism).  What happens before the procedure?  Staying hydrated  Follow instructions from your health care provider about hydration, which may include:  Up to 2 hours before the procedure - you may continue to drink clear liquids, such as water, clear fruit juice, black coffee, and plain tea.  Eating and drinking restrictions  Follow instructions from your health care provider about eating and drinking, which may include:  8 hours before the procedure - stop eating heavy meals or foods, such as meat, fried foods, or fatty foods.  6 hours before the procedure - stop eating light meals or foods, such as toast or cereal.  6 hours before the procedure - stop drinking milk or drinks that contain milk.  2 hours before the procedure - stop drinking clear liquids.  Medicines  Ask your health care provider about:  Changing or stopping your regular medicines. This is especially important if you are taking diabetes medicines or blood thinners.  Taking medicines such as aspirin and ibuprofen. These medicines can thin your blood. Do not take these medicines unless your  health care provider tells you to take them.  Taking over-the-counter medicines, vitamins, herbs, and supplements.  General instructions  Ask your health care provider:  How your surgery site will be marked.  What steps will be taken to help prevent infection. These steps may include:  Removing hair at the surgery site.  Washing skin with a germ-killing soap.  Taking antibiotic medicine.  Plan to have a responsible adult take you home from the hospital or clinic.  Plan to have a responsible adult care for you for the time you are told after you leave the hospital or clinic. This is important.  What happens during the procedure?  An IV will be inserted into one of your veins.  You will be given one or more of the following:  A medicine to help you relax (sedative).  A medicine to numb the area (local anesthetic).  A medicine to make you fall asleep (general anesthetic).  A medicine that is injected into your spine to numb the area below and slightly above the injection site (spinal anesthetic).  A medicine that is injected into an area of your body to numb everything below the injection site (regional anesthetic).  Damaged or diseased tissue and bone will be removed.  Uneven areas of bone will be smoothed.  Blood vessels and nerves will be closed off.  Muscles will be cut and reshaped so that an artificial leg (prosthesis) can be attached to the stump.  The incision will be closed with stitches (sutures), staples, or glue.  A bandage (dressing) will be placed over the incision.  The procedure may vary among health care providers and hospitals.  What happens after the procedure?  Your blood pressure, heart rate, breathing rate, and blood oxygen level will be monitored until you leave the hospital or clinic.  You will be given medicine for pain. The medicine may include:  A sedative.  A spinal anesthetic.  A nerve block. This is an injection of numbing medicine near the nerve.  Muscle relaxants. These may relieve pain  caused by muscle spasms.  Opioids. These medicines relieve pain and are a type of narcotic pain medicine.  Non-opioid medicines. These may include acetaminophen and NSAIDs, such as ibuprofen.  You may start physical therapy within 24 hours after your surgery.  Summary  Leg amputation is a procedure to remove the leg from above, below, or through the knee.  Follow instructions from your health care provider about eating or drinking restrictions before the procedure.  Before the procedure, ask your health care provider how your surgery site will be marked.  This information is not intended to replace advice given to you by your health care provider. Make sure you discuss any questions you have with your health care provider.  Document Revised: 04/21/2022 Document Reviewed: 04/21/2022  [employer] Patient Education © 2023 Else[employer] Inc.    Leg Amputation, Care After  The following information offers guidance on how to care for yourself after your procedure. Your health care provider may also give you more specific instructions. If you have problems or questions, contact your health care provider.  What can I expect after the procedure?  After the procedure, it is common to have:  A little blood or fluid coming from your incision.  Pain from your incision.  Pain that feels like it is coming from the leg that has been removed (phantom pain). This can last for a year or longer.  Skin breakdown on your stump (residual limb).  Feelings of depression, anxiety, and fear.  Follow these instructions at home:  Medicines  Take over-the-counter and prescription medicines only as told by your health care provider.  If you were prescribed an antibiotic medicine, take it as told by your health care provider. Do not stop taking the antibiotic even if you start to feel better.  Ask your health care provider if the medicine prescribed to you:  Requires you to avoid driving or using machinery.  Can cause constipation. You may need to take  these actions to prevent or treat constipation:  Take over-the-counter or prescription medicines.  Eat foods that are high in fiber, such as beans, whole grains, and fresh fruits and vegetables.  Limit foods that are high in fat and processed sugars, such as fried or sweet foods.  Bathing  Do not take baths, swim, use a hot tub, or get your residual limb wet until your health care provider approves. You may only be allowed to take sponge baths.  Ask your health care provider when you may start taking showers. After taking a shower, make sure to rinse and dry your residual limb carefully.  Incision care    Check your residual limb, especially your incision area, every day. Check for:  Blisters.  Scrapes.  Signs of infection, such as:  More redness, swelling, or pain.  More fluid or blood.  Warmth.  Pus or a bad smell.  Follow instructions from your health care provider about how to take care of your incision. Make sure you:  Wash your hands with soap and water for at least 20 seconds before and after you change your bandage (dressing). If soap and water are not available, use hand .  Change your dressing as told by your health care provider.  Leave stitches (sutures), staples, skin glue, or adhesive strips in place. These skin closures may need to stay in place for 2 weeks or longer. If adhesive strip edges start to loosen and curl up, you may trim the loose edges. Do not remove adhesive strips completely unless your health care provider tells you to do that.  Activity  Return to your normal activities as told by your health care provider. Ask your health care provider what activities are safe for you.  Do physical therapy exercises as told by your health care provider.  If you have been fitted with an artificial leg (prosthesis) or have been given crutches or another assistive device, use them as told by your health care provider.  Eating and drinking  Eat a healthy diet that includes whole grains, fruits  and vegetables, low-fat dairy products, and lean proteins.  Drink enough fluid to keep your urine pale yellow.  General instructions  Do not use oils, lotion, cream, or rubbing alcohol on the remaining part of your leg.  Wear compression stockings as told by your health care provider. These stockings help to prevent blood clots and reduce swelling in your legs.  Work with an occupational therapist to learn new strategies for safe driving with an amputation.  If you have trouble coping with your amputation, contact your health care provider. Some feelings of depression, anxiety, or fear are normal after an amputation, but if you struggle with these feelings or if they get overwhelming, your provider may be able to recommend a therapist or support group to help you.  Do not use any products that contain nicotine or tobacco. These products include cigarettes, chewing tobacco, and vaping devices, such as e-cigarettes. These can delay bone and wound healing. If you need help quitting, ask your health care provider.  Keep all follow-up visits. This is important.  Contact a health care provider if:  You have a fever.  You have more tenderness in your residual limb.  You have a rash or itchy skin.  You have a cough or chills and you feel achy and weak.  You have trouble coping with your amputation.  You have blisters or scrapes on your residual limb.  You have any of these signs of infection:  More redness, swelling, or pain around your incision.  More fluid or blood coming from your incision.  The incision feels warm to the touch, tender, and painful.  Pus or a bad smell coming from your incision.  Get help right away if:  You have severe pain in your residual limb.  You feel light-headed and have shortness of breath.  You have blood-soaked bandages.  You have chest pain or pain when taking a deep breath or coughing.  These symptoms may represent a serious problem that is an emergency. Do not wait to see if the symptoms  will go away. Get medical help right away. Call your local emergency services (646 in the [place] Do not drive yourself to the hospital.  If you ever feel like you may hurt yourself or others, or have thoughts about taking your own life, get help right away. Go to your nearest emergency department or:  Call your local emergency services (537 in the [place]  Call a suicide crisis helpline, such as the National Suicide Prevention Lifeline at 1-164.806.8104 or 273 in the [place] This is open 24 hours a day in the [place]  Text the Crisis Text Line at 198369 (in the [place]  Summary  After a leg amputation, you may have pain that feels like it is coming from the leg that was removed (phantom pain). This can last for a year or longer.  Follow instructions from your health care provider about how to take care of your incision.  Check your residual limb, especially your incision area, every day. More redness, swelling, or pain may be a sign of infection.  Contact your health care provider if you have trouble coping with your amputation.  This information is not intended to replace advice given to you by your health care provider. Make sure you discuss any questions you have with your health care provider.  Document Revised: 07/13/2022 Document Reviewed: 04/21/2022  Elsevier Patient Education © 2023 [employer]    Pain Management after Surgery, Injury or Illness    What should I expect if I have pain?  Some pain may be normal.  It is important to know that it may not be possible to completely eliminate your pain.  Our goal is to help you to manage your pain so that you can get back to your normal routine as soon as possible.  We will work together to create a plan to manage your pain and track the progress of the plan.  If you have questions about your care, please tell your nurse or provider.  How is my pain measured?  Your pain will be measured on a scale of 0 to 10.  The pain rating scale will help you to score your pain based  on your ability to function with that pain.  For example, a score of:  0 = No pain  5 = Pain interrupts some activities  10 = Pain is as bad as it can be, nothing else matters  Remember, some pain is expected following illness, injury or surgery.  How will my pain be treated?  You may be offered medication to treat your pain.  You can also use non-medicine treatments to help manage your pain.  If you have severe, uncontrolled pain, you may be prescribed narcotics, also known as opioids.  You have the right to learn about your options and work with your care team to find the best treatment to manage your pain.  Non-Opioid Medicines  Many effective medicines do not need a prescription. Examples include:  Acetaminophen, which you may know as Tylenol®  Ibuprofen, which you may know as Advil® or Motrin®  Aspirin  Other low risk medicines require a prescription and are helpful for treating pain. Examples include:  o Celecoxib, which you may know as Celebrex®  Alternative Therapies  Alternative therapies can be very helpful in managing pain. You can try:  Ice or heat packs as recommended by your care team.  Massage, relaxation techniques or meditation.  Changing positions in bed.  Watching TV or listening to music.  Opioids, also known as Narcotics  Opioids should only be used to treat severe pain that cannot be controlled by other methods.  Opioids have been shown to increase your risk of complications.  Opioids are highly addictive.  Opioids should only be used in the lowest effective dose, for a limited amount of time.  Opioids require a prescription. Some examples include:  Tramadol, known as Ultram®.  Hydrocodone with acetaminophen, known as Lortab®, Vicodin®, Norco®.  Oxycodone with acetaminophen, known as Percocet®, or Roxicet®.  Oxycodone, known as OxyContin®.  Morphine, known as MS Contin®.  What will happen after I go home from the hospital?  Your care team will discuss your ongoing care plan with you before you  leave.  You will be given detailed instructions for any medicine and follow up care.  You may be given a prescription for medicines to take when you go home.  Be sure to tell your care team if you have concerns about caring for yourself at home. Common concerns may include stairs, or living alone.  How should I manage pain when I go home?  Always use non-opioid and non-medicine options first.  Take non-opioid medicine regularly, as instructed by your care team.  Avoid doing things that make your pain worse like heavy lifting or straining.  Use opioids only to treat severe pain that is not controlled by other methods.  Always follow the instructions of your care team.  Always take the lowest effective dose.  Do not take more than prescribed.  Do not mix with sleeping pills or alcohol.  Stop taking opioids when the pain can be managed by other methods listed above.  You may also be referred to a pain specialist when needed.    IMPORTANT INFORMATION:  Side effects of Opiates may include:  Sleepiness  Dizziness  Nausea and/or vomiting  Constipation  Decreased breathing  Addiction  Overdose  Death    Opiate dependency and addiction risk:  The risk of dependency increases after 3 days of continuous use.  There are many local resources to help with dependency issues.  Opiate dependency can develop easily. Don’t feel ashamed.  Speak to your care team if you have concerns.  General medicine safety:  Keep all medicines in a safe place, out of sight so they cannot be taken by someone else.  Keep out of reach of children.  Never mix opioids with sleeping pills, over the counter sleep aids or alcohol.  Do not drive while taking opioids.  Be careful at home when cooking, bathing, showering or using stairs.  You may be more likely to hurt yourself or fall.  For anyone taking opioids, watch for excessive sleepiness or decreased breathing as a sign of overdose. Try to arouse the person if you are concerned. Call 911 if you are  "unable to awaken them OR if their breathing is shallow, slow, or irregular.  Proper medicine disposal:  Empty liquid medicine from containers, open capsules or crush tablets and mix with goldy litter, dirt or old coffee grounds. Place the mixture into a sealed bag or container and throw it in the trash.  Take medicines to a local drug takeback center, for a list visit: https://apps.[employer].gov/pubdispsearch/spring/main?execution=e1s2  Use a home drug disposal pouch.  Contact your local pharmacy for help.    Prevent Falls in Your Home    \"Falling once doubles your chance of falling again\"        -Center for Disease Control and Prevention    Falls in the home can lead to serious injury (fractures, brain injuries), hospitalizations, increased medical costs, and could even be fatal.  The good news is, there are many precautions you can take to avoid falls in your home and help keep you safe:     If prescribed an assistive device (walker, crutches), use as instructed by the healthcare provider\"   Remove any tripping hazards from your home, including loose cords, throw rugs and clutter  Keep a nightlight on in dark (hallways, bathrooms, etc)   Get up slowly, to make sure you feel okay before getting up  Be aware of any side effects of your medications: some medications may make you dizzy  Place a non-skid rubber mat in your shower or tub-consider a shower bench or chair if unsteady on your feet  Wear supportive shoes or non-skid socks when moving around  Start an exercise program once approved by your provider.  If you are feeling weak following a hospital stay, talk to your doctor about home health or outpatient therapy programs designed to help rebuild your strength and endurance    Depression / Suicide Risk    As you are discharged from this [facility] facility, it is important to learn how to keep safe from harming yourself.    Recognize the warning signs:  Abrupt changes in personality, positive or " negative- including increase in energy   Giving away possessions  Change in eating patterns- significant weight changes-  positive or negative  Change in sleeping patterns- unable to sleep or sleeping all the time   Unwillingness or inability to communicate  Depression  Unusual sadness, discouragement and loneliness  Talk of wanting to die  Neglect of personal appearance   Rebelliousness- reckless behavior  Withdrawal from people/activities they love  Confusion- inability to concentrate     If you or a loved one observes any of these behaviors or has concerns about self-harm, here's what you can do:  Talk about it- your feelings and reasons for harming yourself  Remove any means that you might use to hurt yourself (examples: pills, rope, extension cords, firearm)  Get professional help from the community (Mental Health, Substance Abuse, psychological counseling)  Do not be alone:Call your Safe Contact- someone whom you trust who will be there for you.  Call your local CRISIS HOTLINE 158-2270 or 285-203-8625  Call your local Children's Mobile Crisis Response Team [place] (995) 123-5365 or wwwStayhound  Call the toll free National Suicide Prevention Hotlines   National Suicide Prevention Lifeline 713-608-WBKH (7124)  National Hope Line Network 800-SUICIDE (785-0811)            Occupational Therapy Discharge Instructions for Beni Moore    2/21/2024    Level of Assist Required for Eating: Able to Complete Eating without Assist  Level of Assist Required for Grooming: Able to Complete Grooming without Assist  Level of Assist Required for Dressing: Able to Complete Dressing without Assist  Level of Assist Required for Toileting: Able to Complete Toileting without Assist  Level of Assist Required for Toilet Transfer: Able to Complete Toilet Transfer without Assist  Equipment for Toilet Transfer: Commode over Toilet, Grab Bars by Toilet  Level of Assist Required for Bathing: Requires Supervision with  Bathing  Equipment for Bathing: Tub Transfer Bench, Grab Bars in Tub / Shower, Hand Held Shower Head  Level of Assist Required for Shower Transfer: Requires Supervision with Shower Transfer  Equipment for Shower Transfer: Grab Bars in Tub / Shower, Tub Transfer Bench  Level of Assist Required for Home Mgmt: Requires Physical Assist with Home Management  Level of Assist Required for Meal Prep: Requires Physical Assist with Meal Preparation  Driving: Please Contact Physician Prior to Driving  Home Exercise Program: None Issued (Continue UE isometrics, ROM/stretch, pendulum exercises)

## 2024-02-21 NOTE — CARE PLAN
"The patient is Stable - Low risk of patient condition declining or worsening    Shift Goals  Clinical Goals: safety  Patient Goals: safety, sleep      Problem: Fall Risk - Rehab  Goal: Patient will remain free from falls  Outcome: Progressing  Note: Florecita Roldan Fall risk Assessment Score: 22    High fall risk Interventions   - Alarming seatbelt  - Bed and strip alarm   - Yellow sign by the door   - Yellow wrist band \"Fall risk\"  - Room near to the nurse station  - Do not leave patient unattended in the bathroom  - Fall risk education provided  Patient uses call light consistently and appropriately this shift.  Waits for assistance when needed and does not attempt self transfer.  Able to verbalize needs.  Will continue to monitor.      Problem: Skin Integrity  Goal: Skin integrity is maintained or improved  Outcome: Progressing  Note: CVC intact with dressing and no s/s of infections noted. PIV to JOE able to flush but no blood return noted. Skin warm and dry to touch. Kept clean and dry for comfort Continue on close monitoring.      Problem: Diabetes Management  Goal: Patient will achieve and maintain glucose in satisfactory range  Outcome: Progressing  Note: Blood sugar check with coverage given and no s/s of hypo/hyperglycemia noted. Snacks give for bedtime.  Continue on close monitoring.      "

## 2024-02-21 NOTE — THERAPY
Physical Therapy   Discharge Summary     Patient Name: Beni Moore  Age:  53 y.o., Sex:  male  Medical Record #: [id number]  Today's Date: 2/21/2024     Precautions  Precautions: Fall Risk, Non Weight Bearing Left Lower Extremity, Immobilizer Left Lower Extremity  Comments: L BKA, WBAT on L UE s/p ORIF    Subjective    Pt up in wc, ready for PT     Objective       02/21/24 0831   PT Charge Group   PT Therapeutic Exercise (Units) 2   PT Therapeutic Activities (Units) 2   PT Total Time Spent   PT Individual Total Time Spent (Mins) 60   Gait Functional Level of Assist    Gait Level Of Assist Unable to Participate   Wheelchair Functional Level of Assist   Wheelchair Assist Modified Independent   Distance Wheelchair (Feet or Distance) 150+   Wheelchair Description Extra time   Stairs Functional Level of Assist   Level of Assist with Stairs Unable to Participate   Transfer Functional Level of Assist   Bed, Chair, Wheelchair Transfer Modified Independent   Bed Chair Wheelchair Transfer Description Adaptive equipment;Squat pivot transfer to wheelchair   Supine Lower Body Exercise   Bridges Two Legged;2 sets of 15   Hip Abduction Side Lying;2 sets of 15;Left   Hip Adduction  2 sets of 15  (ADRIANA squeeze)   Straight Leg Raises 2 sets of 15;Left   Knee to Chest 2 sets of 15   Gluteal Isometrics 2 sets of 15   Quadriceps Isometrics 2 sets of 15;Left   Other Exercises prone lying hamstring curls/ hip ext 2 x 15.   Bed Mobility    Supine to Sit Modified Independent   Sit to Supine Modified Independent   Sit to Stand Standby Assist   Scooting Modified Independent   Rolling Modified Independent   Roll Left and Right   Assistance Needed Independent   CARE Score - Roll Left and Right 6   Sit to Lying   Assistance Needed Independent   CARE Score - Sit to Lying 6   Lying to Sitting on Side of Bed   Assistance Needed Independent   CARE Score - Lying to Sitting on Side of Bed 6   Sit to Stand   Assistance Needed Adaptive  equipment;Incidental touching   CARE Score - Sit to Stand 4   Chair/Bed-to-Chair Transfer   Assistance Needed Independent;Adaptive equipment   CARE Score - Chair/Bed-to-Chair Transfer 6   Car Transfer   Assistance Needed Adaptive equipment;Incidental touching   CARE Score - Car Transfer 4   Walk 10 Feet   Comment L BKA / L shoulder injury/ R transmet amputation   Reason if not Attempted Medical concerns   CARE Score - Walk 10 Feet 88   Walk 50 Feet with Two Turns   Comment L BKA / L shoulder injury/ R transmet amputation   Reason if not Attempted Medical concerns   CARE Score - Walk 50 Feet with Two Turns 88   Walk 150 Feet   Comment L BKA / L shoulder injury/ R transmet amputation   Reason if not Attempted Medical concerns   CARE Score - Walk 150 Feet 88   Walking 10 Feet on Uneven Surfaces   Comment L BKA / L shoulder injury/ R transmet amputation   Reason if not Attempted Medical concerns   CARE Score - Walking 10 Feet on Uneven Surfaces 88   1 Step (Curb)   Comment L BKA / L shoulder injury/ R transmet amputation   Reason if not Attempted Medical concerns   CARE Score - 1 Step (Curb) 88   4 Steps   Comment L BKA / L shoulder injury/ R transmet amputation   Reason if not Attempted Medical concerns   CARE Score - 4 Steps 88   12 Steps   Comment L BKA / L shoulder injury/ R transmet amputation   Reason if not Attempted Medical concerns   CARE Score - 12 Steps 88   Picking Up Object   Comment L BKA / L shoulder injury/ R transmet amputation   Reason if not Attempted Medical concerns   CARE Score - Picking Up Object 88   Wheel 50 Feet with Two Turns   Assistance Needed Independent   CARE Score - Wheel 50 Feet with Two Turns 6   Type of Wheelchair/Scooter Manual   Wheel 150 Feet   Assistance Needed Independent   CARE Score - Wheel 150 Feet 6   Type of Wheelchair/Scooter Manual     Pt completed sit<>stand/ pivot transfer wc<>Highlander SUV with CGA x 3 trials.  Pt completed mat<>floor transfer via bump up/down through  "8\" step stool, min/mod A to complete.    Assessment    Pt demonstrated modified independence for wc level functional mobility indoors/ level outdoor. Pt requires SBA/ CGA for sit<>stand with FWW and car transfers 2* L BKA, L shoulder injury/weakness and remote R transmet amputation to limit safety with standing balance.     Strengths: Able to follow instructions, Alert and oriented, Effective communication skills, Good carryover of learning, Good insight into deficits/needs, Independent prior level of function, Motivated for self care and independence, Pleasant and cooperative, Supportive family, Willingly participates in therapeutic activities  Barriers: Fatigue, Generalized weakness, Impaired activity tolerance, Impaired balance, Pain    Plan    D/c tomorrow with support from parents and home health PT.      Physical Therapy Problems (Active)       Problem: Balance       Dates: Start:  02/09/24         Goal: STG-Within one week, patient will maintain static standing in parallel bars Gina with single UE support for 10 seconds in order to facilitate completion of standing ADLs       Dates: Start:  02/09/24               Problem: Mobility       Dates: Start:  02/09/24         Goal: STG-Within one week, patient will propel wheelchair community distances of at least 150' independently       Dates: Start:  02/09/24               Problem: Mobility Transfers       Dates: Start:  02/09/24         Goal: STG-Within one week, patient will sit to stand from WC Gina with UE pushing from WC        Dates: Start:  02/09/24               Problem: PT-Long Term Goals       Dates: Start:  02/09/24         Goal: LTG-By discharge, patient will tolerate standing with intermittent UE support for at least 1 minute CGA in order to facilitate standing ADLs        Dates: Start:  02/09/24            Goal: LTG-By discharge, patient will perform squat pivot transfer from one surface to another SPV       Dates: Start:  02/09/24            Goal: " LTG-By discharge, patient will perform home exercise program independently        Dates: Start:  02/09/24            Goal: LTG-By discharge, patient will transfer in/out of a car using LRAD or no AD CGA       Dates: Start:  02/09/24

## 2024-02-21 NOTE — DISCHARGE SUMMARY
Physical Medicine & Rehabilitation Discharge Summary    Admission Date: 2/8/2024    Discharge Date: 2/22/2024    Attending Provider: Mena Ny DO, MS    Admission Diagnosis:   Active Hospital Problems    Diagnosis     *Hx of BKA, left (HCC)     Difficulty sleeping     Anemia     Type 2 diabetes mellitus with kidney complication, with long-term current use of insulin (HCC)     Coronary artery disease involving native coronary artery of native heart without angina pectoris     Functional diarrhea     Hypertension     Acute renal failure superimposed on stage 3b chronic kidney disease (HCC)        Discharge Diagnosis:  Active Hospital Problems    Diagnosis     *Hx of BKA, left (HCC)     Difficulty sleeping     Anemia     Type 2 diabetes mellitus with kidney complication, with long-term current use of insulin (HCC)     Coronary artery disease involving native coronary artery of native heart without angina pectoris     Functional diarrhea     Hypertension     Acute renal failure superimposed on stage 3b chronic kidney disease (HCC)        HPI per Admission History & Physical:  WILFREDO Moore is a 53-year-old male with history significant for chronic kidney disease due to diabetic nephropathy, chronic thrombotic microangiopathy, insulin-dependent diabetes mellitus, peripheral neuropathy, neurogenic bowel secondary to diabetes who presented to [facility] on [facility]/25/2024 with infection of the left foot.  X-ray revealed pathologic fracture and osteomyelitis with gas in the soft tissues.  White count was significantly elevated in the 20s.  His creatinine was elevated in the sixes and he was hypotensive.  He was placed on IV antibiotics.  Orthopedics and nephrology were consulted.  He ultimately underwent left BKA on 2/1/2024 by Dr. Mcmanus and for source control.  He is no longer on antibiotics.  A tunneled dialysis catheter was placed and he was started on hemodialysis 1/31/2024.  He did have acute  blood loss anemia in the setting of chronic kidney disease and recent operation and required 1 unit packed red blood cell.  Dialysis is on Mondays Wednesdays and Fridays.     Patient admitted to acute rehab unit 2/8/2024.  On admission he reports that he has had appear to be here.  He has some questions regarding the logistics of how the rehab hospital works.  He also would like to be placed on cholestyramine at 10 AM and 5 PM to manage his bowels.  He will be going home with his parents who will assist him until he can be more independent.  On admission nursing concerned for bleeding from medial aspect of incision as well as erythema of the residual limb.  Patient does not have any pain.    Patient was admitted to [facility] on 2/8/2024.     Hospital Course by Problem List:  Left BKA secondary to acute on chronic osteomyelitis in setting of diabetes mellitus  Osteomyelitis resolved with infection control with BKA and s/p IV antibiotics  PT and OT for mobility and ADLs. Per guidelines, 15 hours per week between PT, OT and/or SLP.  Follow-up orthopedics     2/20 pictures reviewed from 2/18, no concerns with residual limb healing     Acute blood loss anemia  Postoperative and CKD, required 1 unit PRBC  Hemoglobin stable 2/21 at 7.9  CBC 3 times weekly Monday, Wednesday, Friday April 18 scheduled Monday Wednesday Friday  Nephrology following     Acute renal failure stage IIIb CKD on HD  S/p right IJ tunneled cath placement 1/29/2024  Hemodialysis Monday, Wednesday, Friday 2/12 HD on HOLD. Hoping for renal recovery.   2/21 intermittent dialysis 1 time weekly, next scheduled for today  Nephrology following     Type 2 diabetes mellitus with hyperglycemia, insulin-dependent  Long-term complications causing CKD, peripheral neuropathy, neurogenic bowel  Continue long-acting and short acting insulin  At home had been on Lantus 36 at night with SSI     Hypocalcemia  Stop supplementation 2/20 per  nephrology     Hyperphosphatemia  Resolved on admission 2.7     Hyponatremia  Patient has specific diet for his GI system  2/21 1.5 L fluid restriction per nephrology.  Avoid salt tab.  Nephrology following     Elevated TSH  Normal free T4  Continue to monitor     History of CAD s/p ARTURO 9/22/2019  Last cardiology note 7/7/2023: On atorvastatin 80 mg, aspirin 81 mg, carvedilol 3.125 mg twice daily, Jardiance 25 mg daily  Continue aspirin 81 mg daily and statin 80 mg nightly     Hypertension  Peripheral edema  2/21: Continue amlodipine 7.5 mg daily + losartan 50 mg daily (started by nephrology 2/21) + torsemide 100 mg daily (started by nephrology 2/21)     Pain  Currently not reporting pain  As needed Tylenol  As needed oxycodone     Skin  Patient at risk for skin breakdown due to debility in areas including sacrum, achilles, elbows and head in addition to other sites. Nursing to assess skin daily.      GI Ppx  Patient on Prilosec for GERD prophylaxis.      Bowel   Neurogenic bowel secondary to long-term complications from diabetes  Colestyramine at 10 AM and 5 PM  Follows with GI  Patient on Senna-docusate for constipation prophylaxis.   Abnormal frequency of bowel movements at baseline.  Can go 5 to 7 days.     Bladder  TV/PVR/BS PRN  PVRs in 200's, likely positional. Monitor     Left Humeral Fracture 12/2023 s/p ORIF 12/11/23 Dr. Aris Pierre  D/w Dr. Pierre 2/14/2024 and is WBAT with ROM as tolerated L shoulder/arm     Upcoming Labs/imaging: Daily BMP and CBC 6 while in-house     DVT PROPHYLAXIS: Heparin 5000 units q8 hrs     HOSPITALIST FOLLOWING: No. Nephrology following. D/w nephrology via [facility] 2/20/2024       CODE STATUS: FULL CODE     DISPO: Home with supportive parents.      LISE: 2/22/24     ADDITIONAL MEDICAL NEEDS ON D/C (IV abx, O2, etc): None     MEDS SENT TO: M2B Eligible.  Will send to meds to bed.     DISCHARGE SPECIALIST FOLLOW UP: Orthopedics (ROCK Segura), nephrology     Patient to  scheduled follow up with their PCP within 2 weeks from discharge from the [facility].        Functional Status at Discharge  Eating:  Supervision  Eating Description:  Increased time, Set-up of equipment or meal/tube feeding, Supervision for safety  Grooming:  Modified Independent, Seated  Grooming Description:  Increased time, Initial preparation for task, Seated in wheelchair at sink  Bathing:  Standby Assist (set-up assist; distant sup for seated shower)  Bathing Description:  Adaptive equipment, Grab bar, Hand held shower, Set-up of equipment  Upper Body Dressing:  Modified Independent (to retrieve, don, doff shirt @ w/c level)  Upper Body Dressing Description:  Verbal cueing, Supervision for safety  Lower Body Dressing:  Supervision (to don R sock seated in w/c)  Lower Body Dressing Description:  Reacher  Discharge Location : Relative / Friend's Home (parent's home)  Patient Discharging with Assist of: Family   Level of Supervision Required: Intermittent Supervision  Recommended Equipment for Discharge: Grab Bars in Tub / Shower;Hand Held Shower Head;Grab Bars by Toilet;3 in 1 Commode;Tub Transfer Bench  Recommended Services Upon Discharge: Home Health Occupational Therapy  Long Term Goals Met: 1  Long Term Goals Not Met: 1  Reason(s) for Goals Not Met: Pt continues to require physical assist for IADLs/home management. He can perform seated IADLs/home management @ mod I level.  Criteria for Termination of Services: Maximum Function Achieved for Inpatient Rehabilitation  Walk:  Unable to Participate  Distance Walked:     Number of Times Distance Was Traveled:     Assistive Device:     Gait Deviation:     Wheelchair:  Modified Independent  Distance Propelled:  200 (2x, indoor/ outdoor over unlevel surfaces including self propulsion bwd vs fwd up sloped surfaces)   Wheelchair Description:  Extra time (limited by LUE tolerance)  Stairs Unable to Participate  Stairs Description        Comprehension:     Comprehension Description:     Expression:     Expression Description:     Social Interaction:     Social Interaction Description:     Problem Solving:     Problem Solving Description:     Memory:     Memory Description:          IMena D.O., personally performed a complete drug regimen review and no potential clinically significant medication issues were identified.   Discharge Medication:     Medication List        START taking these medications        Instructions   amLODIPine 2.5 MG Tabs  Commonly known as: Norvasc   Take 3 Tablets by mouth every day.  Dose: 7.5 mg     Lantus SoloStar 100 UNIT/ML Sopn injection  Generic drug: insulin glargine   Inject 15 Units under the skin every evening.  Dose: 15 Units     losartan 50 MG Tabs  Commonly known as: Cozaar   Take 1 Tablet by mouth every day.  Dose: 50 mg     torsemide 100 MG Tabs  Commonly known as: Demadex   Take 1 Tablet by mouth every day.  Dose: 100 mg     traZODone 50 MG Tabs  Commonly known as: Desyrel   Take 1 Tablet by mouth at bedtime as needed for Sleep.  Dose: 50 mg            CONTINUE taking these medications        Instructions   Aspirin Low Dose 81 MG EC tablet  Generic drug: aspirin   Take 1 Tablet by mouth every day.  Dose: 81 mg     atorvastatin 80 MG tablet  Commonly known as: Lipitor   Take 1 Tablet by mouth every evening.  Dose: 80 mg     cholestyramine 4 g packet  Commonly known as: Questran   Take 1 Packet by mouth 2 times a day. 1000 + 1700  Dose: 1 Packet     HumuLIN R 100 UNIT/ML Soln  Generic drug: insulin regular  Next Dose Due: In ject 4-8 units under the skin 3 times a day before meal   Inject 4-8 Units under the skin 3 times a day before meals. Sliding Scale   150 - 200 = 3 units  201 - 250 = 4 units  251 - 300 = 5 units  301 - 350 = 6 units  351 - 400 = 7 units   PLUS CARB CORRECTION  of 1 unit for every 15 g carb  Dose: 4-8 Units     omeprazole 20 MG delayed-release capsule  Commonly known as:  PriLOSEC   Take 1 Capsule by mouth every morning.  Dose: 20 mg            STOP taking these medications      calcium carbonate 500 MG Tabs  Commonly known as: Os-Quinton 500     carvedilol 3.125 MG Tabs  Commonly known as: Coreg     enalapril 20 MG tablet  Commonly known as: Vasotec     Lucentis 0.3 MG/0.05ML Soln  Generic drug: Ranibizumab     sodium bicarbonate 650 MG Tabs  Commonly known as: Sodium Bicarbonate              Discharge Diet:  Current Diet Order   Procedures    Diet Order Diet: Consistent CHO (Diabetic); Second Modifier: (optional): Renal       Discharge Activity:  Per PT/OT    Disposition:  Patient to discharge home with family support and community resources.    Equipment:  Per PT/OT    Follow-up & Discharge Instructions:  Follow up with your primary care provider (PCP) within 7-10 days of discharge to review your medications and take over your care.     If you develop chest pain, fever, chills, change in neurologic function (weakness, sensation changes, vision changes), or other concerning sxs, seek immediate medical attention or call 911.      Future Appointments   Date Time Provider Department Center   2/21/2024  2:00 PM Kenyetta Roy OT [facility] None   5/20/2024  1:20 PM Florinda Pascual M.D. [facility]       Condition on Discharge:  Good    More than 35 minutes was spent on discharging this patient, including face-to-face time, prescription management, and the dictation of this note.    Dr. Mena Ny DO, MS  Department of Physical Medicine & Rehabilitation    Date of Service: 2/22/2024

## 2024-02-22 VITALS
OXYGEN SATURATION: 98 % | WEIGHT: 225 LBS | RESPIRATION RATE: 18 BRPM | BODY MASS INDEX: 30.48 KG/M2 | SYSTOLIC BLOOD PRESSURE: 145 MMHG | HEIGHT: 72 IN | TEMPERATURE: 98.3 F | HEART RATE: 82 BPM | DIASTOLIC BLOOD PRESSURE: 78 MMHG

## 2024-02-22 LAB
ANION GAP SERPL CALC-SCNC: 9 MMOL/L (ref 7–16)
BUN SERPL-MCNC: 40 MG/DL (ref 8–22)
CALCIUM SERPL-MCNC: 7.7 MG/DL (ref 8.5–10.5)
CHLORIDE SERPL-SCNC: 104 MMOL/L (ref 96–112)
CO2 SERPL-SCNC: 24 MMOL/L (ref 20–33)
CREAT SERPL-MCNC: 2.44 MG/DL (ref 0.5–1.4)
ERYTHROCYTE [DISTWIDTH] IN BLOOD BY AUTOMATED COUNT: 50.8 FL (ref 35.9–50)
GFR SERPLBLD CREATININE-BSD FMLA CKD-EPI: 30 ML/MIN/1.73 M 2
GLUCOSE BLD STRIP.AUTO-MCNC: 103 MG/DL (ref 65–99)
GLUCOSE SERPL-MCNC: 105 MG/DL (ref 65–99)
HCT VFR BLD AUTO: 24.7 % (ref 42–52)
HGB BLD-MCNC: 8.1 G/DL (ref 14–18)
MCH RBC QN AUTO: 29.2 PG (ref 27–33)
MCHC RBC AUTO-ENTMCNC: 32.8 G/DL (ref 32.3–36.5)
MCV RBC AUTO: 89.2 FL (ref 81.4–97.8)
PLATELET # BLD AUTO: 224 K/UL (ref 164–446)
PMV BLD AUTO: 10.1 FL (ref 9–12.9)
POTASSIUM SERPL-SCNC: 4.3 MMOL/L (ref 3.6–5.5)
RBC # BLD AUTO: 2.77 M/UL (ref 4.7–6.1)
SODIUM SERPL-SCNC: 137 MMOL/L (ref 135–145)
WBC # BLD AUTO: 4.7 K/UL (ref 4.8–10.8)

## 2024-02-22 PROCEDURE — A9270 NON-COVERED ITEM OR SERVICE: HCPCS | Performed by: INTERNAL MEDICINE

## 2024-02-22 PROCEDURE — 85027 COMPLETE CBC AUTOMATED: CPT

## 2024-02-22 PROCEDURE — 700102 HCHG RX REV CODE 250 W/ 637 OVERRIDE(OP): Performed by: INTERNAL MEDICINE

## 2024-02-22 PROCEDURE — 80048 BASIC METABOLIC PNL TOTAL CA: CPT

## 2024-02-22 PROCEDURE — 36415 COLL VENOUS BLD VENIPUNCTURE: CPT

## 2024-02-22 PROCEDURE — 700102 HCHG RX REV CODE 250 W/ 637 OVERRIDE(OP): Performed by: PHYSICAL MEDICINE & REHABILITATION

## 2024-02-22 PROCEDURE — 82962 GLUCOSE BLOOD TEST: CPT

## 2024-02-22 PROCEDURE — A9270 NON-COVERED ITEM OR SERVICE: HCPCS | Performed by: PHYSICAL MEDICINE & REHABILITATION

## 2024-02-22 PROCEDURE — 700111 HCHG RX REV CODE 636 W/ 250 OVERRIDE (IP): Performed by: PHYSICAL MEDICINE & REHABILITATION

## 2024-02-22 PROCEDURE — 99239 HOSP IP/OBS DSCHRG MGMT >30: CPT | Performed by: PHYSICAL MEDICINE & REHABILITATION

## 2024-02-22 RX ADMIN — OXYCODONE HYDROCHLORIDE AND ACETAMINOPHEN 500 MG: 500 TABLET ORAL at 05:05

## 2024-02-22 RX ADMIN — TORSEMIDE 100 MG: 20 TABLET ORAL at 05:03

## 2024-02-22 RX ADMIN — OMEPRAZOLE 20 MG: 20 CAPSULE, DELAYED RELEASE ORAL at 07:15

## 2024-02-22 RX ADMIN — HEPARIN SODIUM 5000 UNITS: 5000 INJECTION, SOLUTION INTRAVENOUS; SUBCUTANEOUS at 05:06

## 2024-02-22 RX ADMIN — AMLODIPINE BESYLATE 7.5 MG: 5 TABLET ORAL at 05:05

## 2024-02-22 RX ADMIN — CHOLESTYRAMINE 4 G: 4 POWDER, FOR SUSPENSION ORAL at 10:37

## 2024-02-22 RX ADMIN — LOSARTAN POTASSIUM 50 MG: 50 TABLET, FILM COATED ORAL at 05:05

## 2024-02-22 RX ADMIN — ASPIRIN 81 MG: 81 TABLET, COATED ORAL at 05:05

## 2024-02-22 ASSESSMENT — PATIENT HEALTH QUESTIONNAIRE - PHQ9
SUM OF ALL RESPONSES TO PHQ9 QUESTIONS 1 AND 2: 0
SUM OF ALL RESPONSES TO PHQ9 QUESTIONS 1 AND 2: 0
1. LITTLE INTEREST OR PLEASURE IN DOING THINGS: NOT AT ALL
2. FEELING DOWN, DEPRESSED, IRRITABLE, OR HOPELESS: NOT AT ALL
2. FEELING DOWN, DEPRESSED, IRRITABLE, OR HOPELESS: NOT AT ALL
1. LITTLE INTEREST OR PLEASURE IN DOING THINGS: NOT AT ALL

## 2024-02-22 ASSESSMENT — PAIN DESCRIPTION - PAIN TYPE: TYPE: ACUTE PAIN

## 2024-02-22 NOTE — CARE PLAN
"The patient is Stable - Low risk of patient condition declining or worsening    Shift Goals  Clinical Goals: safety  Patient Goals: safety, sleep      Problem: Fall Risk - Rehab  Goal: Patient will remain free from falls  Outcome: Progressing  Note: Florecita Roldan Fall risk Assessment Score: 22    High fall risk Interventions   - Alarming seatbelt  - Bed and strip alarm   - Yellow sign by the door   - Yellow wrist band \"Fall risk\"  - Room near to the nurse station  - Do not leave patient unattended in the bathroom  - Fall risk education provided  Patient uses call light consistently and appropriately this shift.  Waits for assistance when needed and does not attempt self transfer.  Able to verbalize needs.  Will continue to monitor.     Problem: Skin Integrity - Diabetes  Goal: Patient's skin on legs and feet will remain intact while hospitalized  Outcome: Progressing  Note: Dressing  to left BKA  with no evidence of  bleeding or drainage noted. Immobilizer in place with no discomfort complaint.        "

## 2024-02-22 NOTE — DISCHARGE PLANNING
CM.   Tc to [facility] Dialysis Coordinator @ [phone]  She confirms [facility] is now unable to provide HD 1 x per week and referral was sent to [facility]  Chair time is pending  Updated Dr Ny also.     Plan:  awaiting confirmation of out pt dialysis.

## 2024-02-22 NOTE — PROCEDURES
Diagnosis: End-Stage Renal Disease requiring incremental dialysis. ESRD due to diabetic nephropathy e11.22.  Patient seen and examined on hemodialysis during treatment. Patient is stable, tolerating hemodialysis. Denies chest pain and shortness of breath. Orders updated as needed. Please refer to flowsheet for full details.    Access: JACKIE hartley  UF goal: 3L    Plan: Continue HD once weekly. Continue DUNG's once weekly with HD.     OK to discharge from Nephrology standpoint once established with outpatient HD chair.     Varun Ortiz MD  Nephrology   [facility]

## 2024-02-22 NOTE — PROGRESS NOTES
[facility] Services Progress Note    Hemodialysis treatment ordered today per Dr. Ortiz x 3 hours. Treatment initiated at 1504 and ended at 1804.    Pt A/x4, VSS, denies any discomfort. Pt tolerated treatment, stable; see e-flow sheets for details.    Net UF 3,000 mL    Post tx, R IJ tunneled CVC flushed with saline then locked with heparin 1000 units/mL per designated amount in each wing then clamped and capped. Aspirate heparin prior to next CVC use.    Report given to Primary RN.

## 2024-02-22 NOTE — PROGRESS NOTES
NURSING DAILY NOTE    Name: Beni Moore   Date of Admission: 2/8/2024   Admitting Diagnosis: Hx of BKA, left (HCC)  Attending Physician: Mena Ny D.o.  Allergies: Patient has no known allergies.    Safety  Patient Assist  sba at night, independent during daytime hours  Patient Precautions  Fall Risk, Non Weight Bearing Left Lower Extremity, Immobilizer Left Lower Extremity  Precaution Comments  L BKA, WBAT on L UE s/p ORIF  Bed Transfer Status  Modified Independent  Toilet Transfer Status   Standby Assist (w/c <> bariatric drop arm commode lateral scoot)  Assistive Devices  Wheelchair  Oxygen  None - Room Air  Diet/Therapeutic Dining  Current Diet Order   Procedures    Diet Order Diet: Consistent CHO (Diabetic); Second Modifier: (optional): Renal     Pill Administration  whole  Agitated Behavioral Scale  17  ABS Level of Severity  No Agitation    Fall Risk  Has the patient had a fall this admission?   No  Florecita Roldan Fall Risk Scoring  22, HIGH RISK  Fall Risk Safety Measures  poor balance and low vision/ hearing    Vitals  Temperature: 37.1 °C (98.7 °F)  Temp src: Oral  Pulse: 75  Respiration: 18  Blood Pressure: (!) 147/82  Blood Pressure MAP (Calculated): 104 MM HG  BP Location: Right, Upper Arm  Patient BP Position: Sitting     Oxygen  Pulse Oximetry: 97 %  O2 (LPM): 0  O2 Delivery Device: None - Room Air    Bowel and Bladder  Last Bowel Movement  02/19/24  Stool Type  Type 3: Like a sausage, but with cracks on its surface  Bowel Device  Bathroom  Continent  Bladder: Continent void   Bowel: Continent movement  Bladder Function  Urine Void (mL): 600 ml  Number of Times Voided: 1  Urine Color: Straw  Genitourinary Assessment   Bladder Assessment (WDL):  Within Defined Limits  Farrell Catheter: Not Applicable  Urinary Elimination: Anuric/ Dialysis Patient  Urine Color: Straw  Bladder Device: Urinal  Time Void: Yes  Bladder Scan: Post Void  $  Bladder Scan Results (mL): 78    Skin  Johnny Score   16  Sensory Interventions   Bed Types: Standard/Trauma Mattress with Overlay  Skin Preventative Measures: Pillows in Use for Support / Positioning, Waffle Overlay  Moisture Interventions  Moisturizers/Barriers: Barrier Wipes      Pain  Pain Rating Scale  0 - No Pain  Pain Location  Leg  Pain Location Orientation  Left  Pain Interventions   Declines    ADLs    Bathing   Shower  Linen Change   Partial  Personal Hygiene     Chlorhexidine Bath      Oral Care  Brushed Teeth (self)  Teeth/Dentures     Shave  Self  Nutrition Percentage Eaten  Lunch, Between % Consumed  Environmental Precautions  Treaded Slipper Socks on Patient, Personal Belongings, Wastebasket, Call Bell etc. in Easy Reach, Bed in Low Position  Patient Turns/Positioning  Sitting Up in Wheelchair  Patient Turns Assistance/Tolerance  Standby Assist  Bed Positions  Bed Controls On, Bed Locked  Head of Bed Elevated  Self regulated      Psychosocial/Neurologic Assessment  Psychosocial Assessment  Psychosocial (WDL):  Within Defined Limits  Neurologic Assessment  Neuro (WDL): Exceptions to WDL  Level of Consciousness: Alert  Orientation Level: Oriented X4  Cognition: Appropriate judgement, Appropriate safety awareness, Follows commands  Speech: Clear  Motor Function/Sensation Assessment: Motor strength  Muscle Strength Right Arm: Good Strength Against Gravity and Moderate Resistance  Muscle Strength Left Arm: Good Strength Against Gravity and Moderate Resistance  Muscle Strength Right Leg: Fair Strength against Gravity but No Resistance  Muscle Strength Left Leg:  (BKA)  EENT (WDL):  WDL Except    Cardio/Pulmonary Assessment  Edema   LLE Edema: 1+, Generalized  Respiratory Breath Sounds  RUL Breath Sounds: Clear  RML Breath Sounds: Clear  RLL Breath Sounds: Clear  CHRIS Breath Sounds: Clear  LLL Breath Sounds: Clear  Cardiac Assessment   Cardiac (WDL):  WDL Except (hx of htn)

## 2024-02-22 NOTE — DISCHARGE PLANNING
Cm.  Pt discharged today.   Tc to Madeleine; status of HD chair approved; schedule is still pending; Madeleine will reach out to pt once time is confirmed.

## 2024-02-22 NOTE — CARE PLAN
The patient is Stable - Low risk of patient condition declining or worsening    Shift Goals  Clinical Goals: Safety, Discharge  Patient Goals: Discharge    Progress made toward(s) clinical / shift goals:  Discharge  Problem: Knowledge Deficit - Standard  Goal: Patient and family/care givers will demonstrate understanding of plan of care, disease process/condition, diagnostic tests and medications  Outcome: Met     Problem: Skin Integrity  Goal: Skin integrity is maintained or improved  Outcome: Met     Problem: Fall Risk - Rehab  Goal: Patient will remain free from falls  Outcome: Met     Problem: Diabetes Management  Goal: Patient will achieve and maintain glucose in satisfactory range  Outcome: Met     Problem: Skin Integrity - Diabetes  Goal: Patient's skin on legs and feet will remain intact while hospitalized  Outcome: Met     Problem: Infection - Diabetes  Goal: Patient will remain free from signs and symptoms of infection  Outcome: Met  Goal: Promotion wound healing, line and drain management  Outcome: Met       Patient is not progressing towards the following goals:

## 2024-02-22 NOTE — PROGRESS NOTES
NURSING DAILY NOTE    Name: Beni Moore   Date of Admission: 2/8/2024   Admitting Diagnosis: Hx of BKA, left (HCC)  Attending Physician: Mena Ny D.o.  Allergies: Patient has no known allergies.    Safety  Patient Assist  sba at night, independent during daytime hours  Patient Precautions  Fall Risk, Non Weight Bearing Left Lower Extremity, Immobilizer Left Lower Extremity  Precaution Comments  L BKA, WBAT on L UE s/p ORIF  Bed Transfer Status  Modified Independent  Toilet Transfer Status   Standby Assist (w/c <> bariatric drop arm commode lateral scoot)  Assistive Devices  Wheelchair  Oxygen  None - Room Air  Diet/Therapeutic Dining  Current Diet Order   Procedures    Diet Order Diet: Consistent CHO (Diabetic); Second Modifier: (optional): Renal     Pill Administration  whole  Agitated Behavioral Scale  17  ABS Level of Severity  No Agitation    Fall Risk  Has the patient had a fall this admission?   No  Florecita Roldan Fall Risk Scoring  22, HIGH RISK  Fall Risk Safety Measures  bed alarm, chair alarm, and low vision/ hearing    Vitals  Temperature: 36.8 °C (98.3 °F)  Temp src: Oral  Pulse: 82  Respiration: 18  Blood Pressure: (!) 145/78  Blood Pressure MAP (Calculated): 100 MM HG  BP Location: Left, Upper Arm  Patient BP Position: Mazariegos's Position     Oxygen  Pulse Oximetry: 98 %  O2 (LPM): 0  O2 Delivery Device: None - Room Air    Bowel and Bladder  Last Bowel Movement  02/19/24  Stool Type  Type 3: Like a sausage, but with cracks on its surface  Bowel Device  Bathroom  Continent  Bladder: Continent void   Bowel: Continent movement  Bladder Function  Urine Void (mL): 1000 ml  Number of Times Voided: 1  Urine Color: Yellow  Genitourinary Assessment   Bladder Assessment (WDL):  Within Defined Limits  Farrell Catheter: Not Applicable  Urinary Elimination: Anuric/ Dialysis Patient  Urine Color: Yellow  Bladder Device: Urinal  Time Void: Yes  Bladder  Scan: Post Void  $ Bladder Scan Results (mL): 78    Skin  Johnny Score   17  Sensory Interventions   Bed Types: Standard/Trauma Mattress  Skin Preventative Measures: Pillows in Use for Support / Positioning  Moisture Interventions  Moisturizers/Barriers: Barrier Wipes      Pain  Pain Rating Scale  1 - Hardly Notice Pain  Pain Location  Leg  Pain Location Orientation  Left  Pain Interventions   Declines    ADLs    Bathing   Shower  Linen Change   Partial  Personal Hygiene     Chlorhexidine Bath      Oral Care  Brushed Teeth (self)  Teeth/Dentures     Shave  Self  Nutrition Percentage Eaten  Lunch, Between % Consumed  Environmental Precautions  Treaded Slipper Socks on Patient, Personal Belongings, Wastebasket, Call Bell etc. in Easy Reach, Bed in Low Position  Patient Turns/Positioning  Sitting Up in Wheelchair  Patient Turns Assistance/Tolerance  Standby Assist  Bed Positions  Bed Controls On, Bed Locked  Head of Bed Elevated  Self regulated      Psychosocial/Neurologic Assessment  Psychosocial Assessment  Psychosocial (WDL):  Within Defined Limits  Neurologic Assessment  Neuro (WDL): Exceptions to WDL  Level of Consciousness: Alert  Orientation Level: Oriented X4  Cognition: Appropriate judgement, Appropriate safety awareness, Follows commands  Speech: Clear  Motor Function/Sensation Assessment: Motor strength  Muscle Strength Right Arm: Good Strength Against Gravity and Moderate Resistance  Muscle Strength Left Arm: Good Strength Against Gravity and Moderate Resistance  Muscle Strength Right Leg: Fair Strength against Gravity but No Resistance  Muscle Strength Left Leg:  (BKA)  EENT (WDL):  WDL Except    Cardio/Pulmonary Assessment  Edema   LLE Edema: 1+, Generalized  Respiratory Breath Sounds  RUL Breath Sounds: Clear  RML Breath Sounds: Clear  RLL Breath Sounds: Clear  CHRIS Breath Sounds: Clear  LLL Breath Sounds: Clear  Cardiac Assessment   Cardiac (WDL):  WDL Except (hx of htn)

## 2024-02-22 NOTE — PROGRESS NOTES
Patient discharged to home per order.  Reviewed all discharge instructions, appointments, discharge medications, and wound care instructions with patient and daughter; they verbalize understanding.  Education provided in discharge instructions about wound care and Home Safety and Fall Prevention. Discharge paperwork completed; signed copies in chart.  Patient has education binder and all belongings; signed copy in chart.  Pt alert, calm, stable; no change in status from morning assessment.  Patient left facility at 1115 via wheelchair accompanied by  family; escorted to car by staff.  Have enjoyed working with this pleasant patient.

## 2024-02-22 NOTE — DISCHARGE PLANNING
Outpatient Dialysis Note    Patient has been placed and confirmed at:    [facility]  [address], [place] [address]    Ph: [phone]    Schedule: Every Wednesday  Time: 10:30 AM    Patient to start Wed, 2/28 at 10:00 AM    JUAN Seaman and nephrologist Dr. Ortiz notified of placement confirmation.   Provided patient with dialysis schedule welcome letter details via phone call.     Xitlaly Reyes   Dialysis Coordinator / [facility]   Ph: [phone]

## 2024-02-23 ENCOUNTER — PATIENT OUTREACH (OUTPATIENT)
Dept: MEDICAL GROUP | Facility: PHYSICIAN GROUP | Age: 54
End: 2024-02-23
Payer: MEDICARE

## 2024-02-26 NOTE — PROGRESS NOTES
Transitional Care Management  TCM Outreach Date and Time: Filed (2/26/2024  9:42 AM)    Discharge Questions  Actual Discharge Date: 02/22/24  Now that you are home, how are you feeling?: Good  Did you receive any new prescriptions?: Yes  Were you able to get them filled?: Yes  Meds to Bed or Pharmacy filled?: Pharmacy  Do you have any questions about your current medications or new medications (Review Med Rec)?: No  Did you have any durable medical equipment ordered?: Yes  Did you receive it?: Yes  Do you have a follow up appointment scheduled with your PCP?: No  Was an appointment scheduled for the patient?: No  Any issues or paperwork you wish to discuss with your PCP?: No  Reason not scheduled?: Declines  If Home Health was ordered, have they contacted you (Patient): Not Applicable  Did you have enough support after your last discharge?: Yes  Does this patient qualify for the CCM program?: Yes    Transitional Care  Number of attempts made to contact patient: 2  Current or previous attempts completed within two business days of discharge? : Yes  Provided education regarding treatment plan, medications, self-management, ADLs?: No  Has patient completed an Advanced Directive?: No  Has the Care Manager's phone number provided?: No  Is there anything else I can help you with?: No    Discharge Summary  Chief Complaint: foot wound  Admitting Diagnosis: left bka  Discharge Diagnosis: left bka

## 2024-02-27 ENCOUNTER — OFFICE VISIT (OUTPATIENT)
Dept: NEPHROLOGY | Facility: MEDICAL CENTER | Age: 54
End: 2024-02-27
Payer: MEDICARE

## 2024-02-27 VITALS
HEIGHT: 72 IN | OXYGEN SATURATION: 98 % | SYSTOLIC BLOOD PRESSURE: 128 MMHG | BODY MASS INDEX: 27.77 KG/M2 | HEART RATE: 74 BPM | TEMPERATURE: 98.4 F | WEIGHT: 205 LBS | DIASTOLIC BLOOD PRESSURE: 78 MMHG

## 2024-02-27 DIAGNOSIS — D63.1 ANEMIA DUE TO CHRONIC KIDNEY DISEASE, ON CHRONIC DIALYSIS (HCC): ICD-10-CM

## 2024-02-27 DIAGNOSIS — Z79.4 TYPE 2 DIABETES MELLITUS WITH STAGE 5 CHRONIC KIDNEY DISEASE NOT ON CHRONIC DIALYSIS, WITH LONG-TERM CURRENT USE OF INSULIN (HCC): ICD-10-CM

## 2024-02-27 DIAGNOSIS — N18.5 CKD (CHRONIC KIDNEY DISEASE) STAGE 5, GFR LESS THAN 15 ML/MIN (HCC): ICD-10-CM

## 2024-02-27 DIAGNOSIS — E11.22 TYPE 2 DIABETES MELLITUS WITH STAGE 5 CHRONIC KIDNEY DISEASE NOT ON CHRONIC DIALYSIS, WITH LONG-TERM CURRENT USE OF INSULIN (HCC): ICD-10-CM

## 2024-02-27 DIAGNOSIS — Z99.2 ANEMIA DUE TO CHRONIC KIDNEY DISEASE, ON CHRONIC DIALYSIS (HCC): ICD-10-CM

## 2024-02-27 DIAGNOSIS — N18.5 TYPE 2 DIABETES MELLITUS WITH STAGE 5 CHRONIC KIDNEY DISEASE NOT ON CHRONIC DIALYSIS, WITH LONG-TERM CURRENT USE OF INSULIN (HCC): ICD-10-CM

## 2024-02-27 DIAGNOSIS — I10 PRIMARY HYPERTENSION: Chronic | ICD-10-CM

## 2024-02-27 DIAGNOSIS — N18.6 ANEMIA DUE TO CHRONIC KIDNEY DISEASE, ON CHRONIC DIALYSIS (HCC): ICD-10-CM

## 2024-02-27 PROCEDURE — 3074F SYST BP LT 130 MM HG: CPT | Performed by: INTERNAL MEDICINE

## 2024-02-27 PROCEDURE — 3078F DIAST BP <80 MM HG: CPT | Performed by: INTERNAL MEDICINE

## 2024-02-27 PROCEDURE — 99214 OFFICE O/P EST MOD 30 MIN: CPT | Performed by: INTERNAL MEDICINE

## 2024-02-27 RX ORDER — INSULIN DEGLUDEC 100 U/ML
20 INJECTION, SOLUTION SUBCUTANEOUS
COMMUNITY

## 2024-02-27 ASSESSMENT — FIBROSIS 4 INDEX: FIB4 SCORE: 1.38895974875928978

## 2024-02-27 ASSESSMENT — ENCOUNTER SYMPTOMS
SHORTNESS OF BREATH: 0
FEVER: 0
ABDOMINAL PAIN: 0

## 2024-02-27 NOTE — PROGRESS NOTES
"Chief Complaint   Patient presents with    Chronic Kidney Disease       CC: f/u CKD, diabetic nephropathy, chronic TMA    HPI:  Beni Moore is a 53 y.o. male with diabetes complicated by retinopathy requiring injections, hypertension, CKD due to biopsy-proven diabetic nephropathy and chronic TMA, PVD s/p left BKA who presents today for follow-up.    He was hospitalized in Nov/Dec, 2022 with anasarca, improved with high dose diuretics.   Since last office visit in May 2023, patient was hospitalized in January through February 2024 with left foot infection eventually requiring left BKA, with progression of CKD to CKD stage V requiring dialysis.  Patient initiated on dialysis 1/29/2024.  He then had some renal recovery and was monitored without dialysis.  However, patient remained anemic, acidotic, and hypertensive, so he was resumed on dialysis once weekly.  Last dialysis treatment 2/21/2024.      Re: DM2, diagnosed 1998. Patient has had microalbuminuria since at least 2018. Patient has seen an eye doctor and does have retinopathy requiring Eyelea injections (Dr. Yosef Dumont then changed to Dr. Perla LIRA) since 2020 and it has saved his vision. He has also had some foot amputations. He worries his sugars are too low. He sees Thomas Meza. He's on Tresiba and Humilin R. He sees Dr. Perla LIRA with ophtho. He hasn't had Eylea injections in a while because he's been in the hospital, but hoping to get that back on track.     Re: HTN, diagnosed 2018. BP control over the years has been controlled, until mid-late 2021. Was checking BP at home, but hasn't been recently.  He's on losartan, amlodipine, and torsemide. Says he urinates well with torsemide.     Re: CKD, denies history of AXEL, kidney stone.  He denies LUTS. Denies NSAIDs. Appetite is \"really good.\" Denies nausea, vomiting. Denies headaches.      Re: CAD, he had MI in 9/2020 and was started on coreg, currently off coreg. He remains on aspirin.       Past Medical " History:   Diagnosis Date    Anesthesia     Hypotension post-op, persisted for a few months    Anesthesia 12/06/2023    Patient stated after 2016 colonoscopy he became hypotensive.    Bowel habit changes     History GI problems    Cataract     IOL - Left eye    Cataract     IOL OD    Deaf, right     Diabetes (HCC)     Oral medications & insulin    Heart burn     GERD; takes Prilosec    Hemorrhagic disorder (HCC)     ASA protection for stent and cardiac history    History of non-ST elevation myocardial infarction (NSTEMI)     Per Problem List    Hx of heart artery stent 2019    Hyperlipidemia     Hyperparathyroidism due to renal insufficiency (HCC)     Per Problem List    Hypertension     Myocardial infarct (HCC)     Pneumonia     H/O    Renal disorder 12/06/2023    Stage 3 CKD       Past Surgical History:   Procedure Laterality Date    KNEE AMPUTATION BELOW Left 2/1/2024    Procedure: AMPUTATION, BELOW KNEE;  Surgeon: Celestino Segura M.D.;  Location: [facility];  Service: Orthopedics    TOE AMPUTATION Left 1/30/2024    Procedure: AMPUTATION, TOE- 5TH;  Surgeon: Celestino Segura M.D.;  Location: [facility];  Service: Orthopedics    PB OPEN TREATMENT PBOX HUMERAL FRACTURE Left 12/11/2023    Procedure: LEFT OPEN REDUCTION INTERNAL FIXATION  HUMERUS, PROXIMAL AND LEFT BICEPS TENODESIS;  Surgeon: Aris Pierre M.D.;  Location: [facility];  Service: Orthopedics    PB REPAIR BICEPS LONG TENDON Left 12/11/2023    Procedure: TENODESIS, BICEPS, OPEN;  Surgeon: Aris Pierre M.D.;  Location: [facility];  Service: Orthopedics    CATARACT EXTRACTION WITH IOL Right 2023    TOE AMPUTATION Left 09/30/2021    Procedure: AMPUTATION, TOE;  Surgeon: Tomer Hart M.D.;  Location: [facility];  Service: Orthopedics    STENT PLACEMENT  2019    STEMI with nonobstructive LAD    TOE AMPUTATION Right 07/02/2018    Procedure: Transmetatarsal amputation;  Surgeon: Ravi Bernardo  M.D.;  Location: [facility];  Service: Orthopedics    ACHILLES TENDON REPAIR Right 07/02/2018    Procedure: ACHILLES LENGTHENING;  Surgeon: Ravi Bernardo M.D.;  Location: [facility];  Service: Orthopedics    IRRIGATION & DEBRIDEMENT ORTHO Right 07/02/2018    Procedure: IRRIGATION & DEBRIDEMENT ORTHO;  Surgeon: Ravi Bernardo M.D.;  Location: [facility];  Service: Orthopedics    OTHER Left     IOL    OTHER ABDOMINAL SURGERY      OTHER ORTHOPEDIC SURGERY      TONSILLECTOMY      VASECTOMY          Outpatient Encounter Medications as of 2/27/2024   Medication Sig Dispense Refill    aspirin 81 MG EC tablet Take 1 Tablet by mouth every day. 30 Tablet 2    atorvastatin (LIPITOR) 80 MG tablet Take 1 Tablet by mouth every evening. 30 Tablet 2    amLODIPine (NORVASC) 2.5 MG Tab Take 3 Tablets by mouth every day. 90 Tablet 2    losartan (COZAAR) 50 MG Tab Take 1 Tablet by mouth every day. 30 Tablet 2    torsemide (DEMADEX) 100 MG Tab Take 1 Tablet by mouth every day. 30 Tablet 2    traZODone (DESYREL) 50 MG Tab Take 1 Tablet by mouth at bedtime as needed for Sleep. 30 Tablet 0    insulin glargine (LANTUS SOLOSTAR) 100 UNIT/ML Solution Pen-injector injection Inject 15 Units under the skin every evening. 15 mL 0    HUMULIN R 100 UNIT/ML Solution Inject 4-8 Units under the skin 3 times a day before meals. Sliding Scale   150 - 200 = 3 units  201 - 250 = 4 units  251 - 300 = 5 units  301 - 350 = 6 units  351 - 400 = 7 units   PLUS CARB CORRECTION  of 1 unit for every 15 g carb      cholestyramine (QUESTRAN) 4 g packet Take 1 Packet by mouth 2 times a day. 1000 + 1700      omeprazole (PRILOSEC) 20 MG delayed-release capsule Take 1 Capsule by mouth every morning.       No facility-administered encounter medications on file as of 2/27/2024.        No Known Allergies          Review of Systems   Constitutional:  Negative for fever.   Respiratory:  Negative for shortness of breath.     Cardiovascular:  Negative for chest pain.   Gastrointestinal:  Negative for abdominal pain.   Genitourinary:  Negative for dysuria and hematuria.   All other systems reviewed and are negative.      /78 (BP Location: Right arm, Patient Position: Sitting, BP Cuff Size: Adult)   Pulse 74   Temp 36.9 °C (98.4 °F) (Temporal)   Ht 1.829 m (6')   Wt 93 kg (205 lb)   SpO2 98%   BMI 27.80 kg/m²     Physical Exam  Constitutional:       General: He is not in acute distress.  HENT:      Mouth/Throat:      Pharynx: No oropharyngeal exudate.   Eyes:      General: No scleral icterus.  Neck:      Trachea: No tracheal deviation.   Cardiovascular:      Rate and Rhythm: Normal rate and regular rhythm.      Heart sounds: Normal heart sounds. No murmur heard.  Pulmonary:      Effort: Pulmonary effort is normal.      Breath sounds: Normal breath sounds. No stridor. No rales.   Abdominal:      General: Bowel sounds are normal.      Palpations: Abdomen is soft.      Tenderness: There is no abdominal tenderness.   Musculoskeletal:         General: Deformity (left BKA noted) present. Normal range of motion.      Cervical back: Neck supple.      Right lower leg: Edema present.      Left lower leg: Edema present.   Skin:     General: Skin is warm and dry.      Findings: No rash.   Neurological:      General: No focal deficit present.      Mental Status: He is alert and oriented to person, place, and time.   Psychiatric:         Mood and Affect: Mood and affect normal.         Behavior: Behavior normal.         Labs reviewed.    Recent Labs     01/09/24  1917 01/10/24  0251 02/09/24  0557 02/10/24  0616 02/13/24  1300 02/14/24  1245 02/17/24  0550 02/18/24  0545 02/19/24  0559 02/20/24  0601 02/21/24  0547 02/22/24  0520   ALBUMIN 3.0*   < > 1.8*  --  2.1*  --   --   --   --   --  2.4*  --    HDL 36*  --   --   --   --   --   --   --   --   --   --   --    TRIGLYCERIDE 138  --   --   --   --   --   --   --   --   --   --   --     SODIUM 133*   < > 130*   < > 130*   < > 135 134*   < > 132* 133* 137   POTASSIUM 3.7   < > 4.1   < > 4.7   < > 4.7 4.8   < > 4.6 4.7 4.3   CHLORIDE 100   < > 100   < > 101   < > 105 105   < > 104 105 104   CO2 17*   < > 23   < > 20   < > 19* 19*   < > 19* 18* 24   BUN 81*   < > 30*   < > 50*   < > 55* 57*   < > 61* 64* 40*   CREATININE 4.55*   < > 2.57*   < > 2.70*   < > 2.97* 3.15*   < > 3.06* 3.24* 2.44*   PHOSPHORUS 6.4*   < > 2.7  --  2.4*  --  3.1 3.2  --   --  3.1  --     < > = values in this interval not displayed.       Lab Results   Component Value Date/Time    WBC 4.7 (L) 02/22/2024 05:20 AM    RBC 2.77 (L) 02/22/2024 05:20 AM    HEMOGLOBIN 8.1 (L) 02/22/2024 05:20 AM    HEMATOCRIT 24.7 (L) 02/22/2024 05:20 AM    MCV 89.2 02/22/2024 05:20 AM    MCH 29.2 02/22/2024 05:20 AM    MCHC 32.8 02/22/2024 05:20 AM    MPV 10.1 02/22/2024 05:20 AM              URINALYSIS:  Lab Results   Component Value Date/Time    COLORURINE Yellow 01/09/2024 2142    CLARITY Clear 01/09/2024 2142    SPECGRAVITY 1.013 01/09/2024 2142    PHURINE 5.0 01/09/2024 2142    KETONES Negative 01/09/2024 2142    PROTEINURIN 300 (A) 01/09/2024 2142    BILIRUBINUR Negative 01/09/2024 2142    UROBILU 0.2 01/09/2024 2142    NITRITE Negative 01/09/2024 2142    LEUKESTERAS Negative 01/09/2024 2142    OCCULTBLOOD Small (A) 01/09/2024 2142       UP  Lab Results   Component Value Date/Time    TOTPROTUR 258.0 (H) 05/03/2023 1210      Lab Results   Component Value Date/Time    CREATININEU 27.57 05/03/2023 1210             Imaging reviewed  No orders to display       Kidney biopsy 3/29/2022 interpreted by Dr. Ac  Diagnosis: Chronic glomerular microangiopathic changes.  Nodular diabetic glomerulosclerosis.  Comment: In light of the clinical history, the microangiopathic changes are likely secondary to anti-VEGF inhibitor use.  Other causes of chronic thrombotic microangiopathy are also on the differential.  There are no acute thrombi.  Light  microscopy notable for approximately 50% interstitial fibrosis and tubular atrophy as well as evidence of acute tubular injury      Assessment:  Beni Moore is a 53 y.o. male with diabetes complicated by retinopathy requiring injections, hypertension, CKD due to biopsy-proven diabetic nephropathy and chronic TMA, PVD s/p left BKA who presents today for follow-up.    Plan:  1. CKD stage 5.    -Based on 24-hour urine creatinine clearance done on 2/9/2024, his creatinine clearance is about 12 points lower than his estimated GFR.  Therefore, patient is in stage V CKD.  He has ongoing metabolic acidosis and anemia, and I believe he is therefore ESRD and requires ongoing dialysis.  However, he is stable for incremental once weekly hemodialysis.  Patient will be set up with once weekly hemodialysis at [facility].  Etiology of CKD from biopsy-proven diabetic nephropathy and chronic thrombotic microangiopathy.  Will continue discussions with patient about possibly switching to peritoneal dialysis.  Once he is established in dialysis, will refer for transplant.  Discontinue Jardiance given CKD progression.  Continue losartan for long-term residual kidney protection.    2. Type 2 diabetes mellitus with hyperglycemia, with long-term current use of insulin (HCC)  -Controlled per patient.  Maintain losartan for long-term residual kidney function protection.  Patient follows with Thomas Meza.    3. Primary hypertension  -Mildly uncontrolled, likely due to ongoing volume overload.  Continue torsemide 100 mg p.o. daily.  Will continue to challenge target weight with dialysis.  Recommend low-sodium diet.  Continue losartan and amlodipine for now, but I might change amlodipine to carvedilol in the future.    4.  Anemia, due to chronic kidney disease  - Anemia persists.  Patient will be started on iron and DUNG protocols with dialysis.    5.  Hyperparathyroidism and vitamin D deficiency  - Hyperparathyroidism secondary  due to chronic kidney disease.  Patient will be monitored and started on calcitriol per protocol in the outpatient dialysis unit.    This patient will be followed up in outpatient hemodialysis, will discharge from [facility] kidney [facility] clinic.    Varun Ortiz MD  Nephrology  [facility]

## 2024-03-18 DIAGNOSIS — I25.10 CORONARY ARTERY DISEASE INVOLVING NATIVE CORONARY ARTERY OF NATIVE HEART WITHOUT ANGINA PECTORIS: ICD-10-CM

## 2024-03-18 RX ORDER — ASPIRIN 81 MG/1
81 TABLET, COATED ORAL
Qty: 90 TABLET | OUTPATIENT
Start: 2024-03-18

## 2024-04-30 NOTE — PATIENT INSTRUCTIONS
Keep dressing clean and dry and cover while bathing. Only change dressing if over saturated, soiled or its falling off.     Should you experience any significant changes in your wound(s) such as infection (redness, swelling, localized heat, increased pain, fever >101 F, chills) or have any questions regarding your home care instructions, please contact the wound center [phone]. If after hours, contact your primary care physician or go the hospital emergency room.    
No

## 2024-05-14 DIAGNOSIS — I13.0 HYPERTENSIVE HEART AND CHRONIC KIDNEY DISEASE WITH HEART FAILURE AND STAGE 1 THROUGH STAGE 4 CHRONIC KIDNEY DISEASE, OR UNSPECIFIED CHRONIC KIDNEY DISEASE (HCC): ICD-10-CM

## 2024-05-14 RX ORDER — LOSARTAN POTASSIUM 50 MG/1
50 TABLET ORAL DAILY
Qty: 30 TABLET | Refills: 2 | OUTPATIENT
Start: 2024-05-14

## 2024-05-14 RX ORDER — AMLODIPINE BESYLATE 2.5 MG/1
7.5 TABLET ORAL DAILY
Qty: 90 TABLET | Refills: 2 | OUTPATIENT
Start: 2024-05-14

## 2024-05-14 RX ORDER — TORSEMIDE 100 MG/1
100 TABLET ORAL DAILY
Qty: 30 TABLET | Refills: 2 | OUTPATIENT
Start: 2024-05-14

## 2024-05-20 ENCOUNTER — OFFICE VISIT (OUTPATIENT)
Dept: MEDICAL GROUP | Facility: PHYSICIAN GROUP | Age: 54
End: 2024-05-20
Payer: MEDICARE

## 2024-05-20 VITALS
SYSTOLIC BLOOD PRESSURE: 126 MMHG | HEIGHT: 72 IN | DIASTOLIC BLOOD PRESSURE: 70 MMHG | HEART RATE: 74 BPM | BODY MASS INDEX: 26.84 KG/M2 | OXYGEN SATURATION: 98 % | WEIGHT: 198.2 LBS | RESPIRATION RATE: 18 BRPM | TEMPERATURE: 97.5 F

## 2024-05-20 DIAGNOSIS — E11.3599 TYPE 2 DIABETES MELLITUS WITH PROLIFERATIVE RETINOPATHY WITHOUT MACULAR EDEMA, WITHOUT LONG-TERM CURRENT USE OF INSULIN, UNSPECIFIED LATERALITY (HCC): ICD-10-CM

## 2024-05-20 DIAGNOSIS — K59.1 FUNCTIONAL DIARRHEA: ICD-10-CM

## 2024-05-20 DIAGNOSIS — N18.5 CKD (CHRONIC KIDNEY DISEASE) STAGE 5, GFR LESS THAN 15 ML/MIN (HCC): ICD-10-CM

## 2024-05-20 DIAGNOSIS — N18.5 TYPE 2 DIABETES MELLITUS WITH STAGE 5 CHRONIC KIDNEY DISEASE NOT ON CHRONIC DIALYSIS, WITH LONG-TERM CURRENT USE OF INSULIN (HCC): ICD-10-CM

## 2024-05-20 DIAGNOSIS — Z95.5 PRESENCE OF STENT IN CORONARY ARTERY: ICD-10-CM

## 2024-05-20 DIAGNOSIS — Z79.4 TYPE 2 DIABETES MELLITUS WITH STAGE 5 CHRONIC KIDNEY DISEASE NOT ON CHRONIC DIALYSIS, WITH LONG-TERM CURRENT USE OF INSULIN (HCC): ICD-10-CM

## 2024-05-20 DIAGNOSIS — E11.22 TYPE 2 DIABETES MELLITUS WITH STAGE 5 CHRONIC KIDNEY DISEASE NOT ON CHRONIC DIALYSIS, WITH LONG-TERM CURRENT USE OF INSULIN (HCC): ICD-10-CM

## 2024-05-20 PROCEDURE — 3074F SYST BP LT 130 MM HG: CPT | Performed by: FAMILY MEDICINE

## 2024-05-20 PROCEDURE — 3078F DIAST BP <80 MM HG: CPT | Performed by: FAMILY MEDICINE

## 2024-05-20 PROCEDURE — 99213 OFFICE O/P EST LOW 20 MIN: CPT | Performed by: FAMILY MEDICINE

## 2024-05-20 ASSESSMENT — FIBROSIS 4 INDEX: FIB4 SCORE: 1.38895974875928978

## 2024-05-20 NOTE — PROGRESS NOTES
Subjective:     CC:   Chief Complaint   Patient presents with    Follow-Up       HPI:   Beni presents today for follow-up.  Last time I did see patient was May of last year he has had a lot of medical visits.  Patient is seeing his ophthalmologist every 6 weeks due to diabetic retinopathy.  Patient is getting dialysis done once a week due to his kidney function.  Patient does see an endocrinologist he states his last hemoglobin A1c was 7.3 he sees his endocrinologist every 3 months.  I did asked patient about cardiology he has not seen cardiology I would definitely recommend he follow-up with cardiology also.  Patient at this time feels the present medication he is on for his diarrhea the Questran is working well for him and he does not need to follow-up with GI at this time.  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  Patient denies any issues at this time    Past Medical History:   Diagnosis Date    Anesthesia     Hypotension post-op, persisted for a few months    Anesthesia 12/06/2023    Patient stated after 2016 colonoscopy he became hypotensive.    Bowel habit changes     History GI problems    Cataract     IOL - Left eye    Cataract     IOL OD    Deaf, right     Diabetes (HCC)     Oral medications & insulin    Heart burn     GERD; takes Prilosec    Hemorrhagic disorder (HCC)     ASA protection for stent and cardiac history    History of non-ST elevation myocardial infarction (NSTEMI)     Per Problem List    Hx of heart artery stent 2019    Hyperlipidemia     Hyperparathyroidism due to renal insufficiency (HCC)     Per Problem List    Hypertension     Myocardial infarct (HCC)     Pneumonia     H/O    Renal disorder 12/06/2023    Stage 3 CKD       Social History     Tobacco Use    Smoking status: Former     Current packs/day: 0.00     Average packs/day: 0.5 packs/day for 10.0 years (5.0 ttl pk-yrs)     Types: Cigarettes     Start date: 1992     Quit date: 2002      Years since quittin.3    Smokeless tobacco: Never   Vaping Use    Vaping status: Never Used   Substance Use Topics    Alcohol use: No    Drug use: Not Currently     Types: Marijuana, Oral     Comment: CBD Edibles 3-4 times per week       Current Outpatient Medications Ordered in Epic   Medication Sig Dispense Refill    Insulin Degludec (TRESIBA) 100 UNIT/ML Solution Inject 20 Units under the skin at bedtime.      aspirin 81 MG EC tablet Take 1 Tablet by mouth every day. 30 Tablet 2    atorvastatin (LIPITOR) 80 MG tablet Take 1 Tablet by mouth every evening. 30 Tablet 2    losartan (COZAAR) 50 MG Tab Take 1 Tablet by mouth every day. (Patient taking differently: Take 100 mg by mouth every day.) 30 Tablet 2    torsemide (DEMADEX) 100 MG Tab Take 1 Tablet by mouth every day. 30 Tablet 2    HUMULIN R 100 UNIT/ML Solution Inject 4-8 Units under the skin 3 times a day before meals. Sliding Scale   150 - 200 = 3 units  201 - 250 = 4 units  251 - 300 = 5 units  301 - 350 = 6 units  351 - 400 = 7 units   PLUS CARB CORRECTION  of 1 unit for every 15 g carb      cholestyramine (QUESTRAN) 4 g packet Take 1 Packet by mouth 2 times a day. 1000 + 1700      omeprazole (PRILOSEC) 20 MG delayed-release capsule Take 1 Capsule by mouth every morning.       No current [facility]-ordered facility-administered medications on file.       Allergies:  Patient has no known allergies.    Health Maintenance: Completed    ROS:  Gen: no fevers/chills, no changes in weight  Eyes: no changes in vision  ENT: no sore throat, no hearing loss, no bloody nose  Pulm: no sob, no cough  CV: no chest pain, no palpitations  GI: no nausea/vomiting, no diarrhea  : no dysuria  Neuro: no headaches, no numbness/tingling  Heme/Lymph: no easy bruising    Objective:     Exam:  /70 (BP Location: Right arm, Patient Position: Sitting, BP Cuff Size: Adult)   Pulse 74   Temp 36.4 °C (97.5 °F) (Temporal)   Resp 18   Ht 1.829 m (6')   Wt 89.9 kg (198 lb 3.2  oz)   SpO2 98%   BMI 26.88 kg/m²  Body mass index is 26.88 kg/m².    Gen: Alert and oriented, No apparent distress.  Skin: Warm and dry.  No obvious lesions.  Eyes: Sclera wnl Pupils normal in size  Lungs: Normal effort, CTA bilaterally, no wheezes, rhonchi, or rales  CV: Regular rate and rhythm. No murmurs, rubs, or gallops.  Musculoskeletal:  No extremity cyanosis, clubbing, or edema.  Neuro: Oriented to person, place and time  Psych: Mood is wnl     Assessment & Plan:     53 y.o. male with the following -     1. Type 2 diabetes mellitus with stage 5 chronic kidney disease not on chronic dialysis, with long-term current use of insulin ([facility])  Patient to continue to follow-up with endocrinology for his diabetes management.    2. Type 2 diabetes mellitus with proliferative retinopathy without macular edema, without long-term current use of insulin, unspecified laterality ([facility])  Patient is seeing his eye provider every 6 weeks at this time  3. CKD (chronic kidney disease) stage 5, GFR less than 15 ml/min ([facility])  Patient is getting dialysis done once a week    4. Presence of stent in coronary artery  I would strongly recommend he follow-up with cardiology and make a follow-up appointment.  Patient denies any issues with chest pain but with all the other issues he is having with his diabetes he should get his heart rate evaluated again.  Last lipid panel looked within normal limits    5. Functional diarrhea  Patient feels his diarrhea is stable.       Return in about 3 months (around 8/20/2024), or if symptoms worsen or fail to improve.  I would recommend patient check with endocrinology if they are following him up on his vitamin D level  Please note that this dictation was created using voice recognition software. I have made every reasonable attempt to correct obvious errors, but I expect that there are errors of grammar and possibly content that I did not discover before finalizing the note.

## 2024-06-13 DIAGNOSIS — I25.10 CORONARY ARTERY DISEASE INVOLVING NATIVE CORONARY ARTERY OF NATIVE HEART WITHOUT ANGINA PECTORIS: ICD-10-CM

## 2024-06-13 RX ORDER — ASPIRIN 81 MG/1
81 TABLET, COATED ORAL
Qty: 90 TABLET | OUTPATIENT
Start: 2024-06-13

## 2024-08-28 PROBLEM — S42.209A PROXIMAL HUMERUS FRACTURE: Status: ACTIVE | Noted: 2024-08-28

## 2024-08-29 ENCOUNTER — PRE-ADMISSION TESTING (OUTPATIENT)
Dept: ADMISSIONS | Facility: MEDICAL CENTER | Age: 54
End: 2024-08-29
Attending: STUDENT IN AN ORGANIZED HEALTH CARE EDUCATION/TRAINING PROGRAM
Payer: MEDICARE

## 2024-08-29 DIAGNOSIS — Z01.812 PRE-OPERATIVE LABORATORY EXAMINATION: ICD-10-CM

## 2024-08-29 DIAGNOSIS — Z01.810 PRE-OPERATIVE CARDIOVASCULAR EXAMINATION: ICD-10-CM

## 2024-08-29 LAB
ALBUMIN SERPL BCP-MCNC: 3.3 G/DL (ref 3.2–4.9)
ALBUMIN/GLOB SERPL: 1 G/DL
ALP SERPL-CCNC: 90 U/L (ref 30–99)
ALT SERPL-CCNC: 7 U/L (ref 2–50)
ANION GAP SERPL CALC-SCNC: 14 MMOL/L (ref 7–16)
AST SERPL-CCNC: 6 U/L (ref 12–45)
BILIRUB SERPL-MCNC: 0.8 MG/DL (ref 0.1–1.5)
BUN SERPL-MCNC: 53 MG/DL (ref 8–22)
CALCIUM ALBUM COR SERPL-MCNC: 8.2 MG/DL (ref 8.5–10.5)
CALCIUM SERPL-MCNC: 7.6 MG/DL (ref 8.5–10.5)
CHLORIDE SERPL-SCNC: 96 MMOL/L (ref 96–112)
CO2 SERPL-SCNC: 24 MMOL/L (ref 20–33)
CREAT SERPL-MCNC: 5.36 MG/DL (ref 0.5–1.4)
EKG IMPRESSION: NORMAL
ERYTHROCYTE [DISTWIDTH] IN BLOOD BY AUTOMATED COUNT: 47.2 FL (ref 35.9–50)
EST. AVERAGE GLUCOSE BLD GHB EST-MCNC: 183 MG/DL
GFR SERPLBLD CREATININE-BSD FMLA CKD-EPI: 12 ML/MIN/1.73 M 2
GLOBULIN SER CALC-MCNC: 3.2 G/DL (ref 1.9–3.5)
GLUCOSE SERPL-MCNC: 212 MG/DL (ref 65–99)
HBA1C MFR BLD: 8 % (ref 4–5.6)
HCT VFR BLD AUTO: 35.1 % (ref 42–52)
HGB BLD-MCNC: 11.5 G/DL (ref 14–18)
MCH RBC QN AUTO: 28.6 PG (ref 27–33)
MCHC RBC AUTO-ENTMCNC: 32.8 G/DL (ref 32.3–36.5)
MCV RBC AUTO: 87.3 FL (ref 81.4–97.8)
PLATELET # BLD AUTO: 170 K/UL (ref 164–446)
PMV BLD AUTO: 10.2 FL (ref 9–12.9)
POTASSIUM SERPL-SCNC: 3.2 MMOL/L (ref 3.6–5.5)
PROT SERPL-MCNC: 6.5 G/DL (ref 6–8.2)
RBC # BLD AUTO: 4.02 M/UL (ref 4.7–6.1)
SODIUM SERPL-SCNC: 134 MMOL/L (ref 135–145)
WBC # BLD AUTO: 6.9 K/UL (ref 4.8–10.8)

## 2024-08-29 PROCEDURE — 93010 ELECTROCARDIOGRAM REPORT: CPT | Performed by: INTERNAL MEDICINE

## 2024-08-29 PROCEDURE — 36415 COLL VENOUS BLD VENIPUNCTURE: CPT

## 2024-08-29 PROCEDURE — 93005 ELECTROCARDIOGRAM TRACING: CPT

## 2024-08-29 PROCEDURE — 80053 COMPREHEN METABOLIC PANEL: CPT

## 2024-08-29 PROCEDURE — 83036 HEMOGLOBIN GLYCOSYLATED A1C: CPT | Mod: GA

## 2024-08-29 PROCEDURE — 85027 COMPLETE CBC AUTOMATED: CPT

## 2024-08-29 RX ORDER — LOSARTAN POTASSIUM 100 MG/1
100 TABLET ORAL DAILY
COMMUNITY
Start: 2024-08-08

## 2024-08-30 ENCOUNTER — ANESTHESIA (OUTPATIENT)
Dept: SURGERY | Facility: MEDICAL CENTER | Age: 54
End: 2024-08-30
Payer: MEDICARE

## 2024-08-30 ENCOUNTER — HOSPITAL ENCOUNTER (OUTPATIENT)
Facility: MEDICAL CENTER | Age: 54
End: 2024-08-30
Attending: STUDENT IN AN ORGANIZED HEALTH CARE EDUCATION/TRAINING PROGRAM | Admitting: STUDENT IN AN ORGANIZED HEALTH CARE EDUCATION/TRAINING PROGRAM
Payer: MEDICARE

## 2024-08-30 ENCOUNTER — ANESTHESIA EVENT (OUTPATIENT)
Dept: SURGERY | Facility: MEDICAL CENTER | Age: 54
End: 2024-08-30
Payer: MEDICARE

## 2024-08-30 ENCOUNTER — APPOINTMENT (OUTPATIENT)
Dept: RADIOLOGY | Facility: MEDICAL CENTER | Age: 54
End: 2024-08-30
Attending: STUDENT IN AN ORGANIZED HEALTH CARE EDUCATION/TRAINING PROGRAM
Payer: MEDICARE

## 2024-08-30 VITALS
HEIGHT: 72 IN | TEMPERATURE: 97 F | HEART RATE: 85 BPM | DIASTOLIC BLOOD PRESSURE: 70 MMHG | WEIGHT: 193.78 LBS | BODY MASS INDEX: 26.25 KG/M2 | OXYGEN SATURATION: 94 % | SYSTOLIC BLOOD PRESSURE: 138 MMHG | RESPIRATION RATE: 18 BRPM

## 2024-08-30 LAB
FUNGUS SPEC CULT: NORMAL
FUNGUS SPEC FUNGUS STN: NORMAL
FUNGUS SPEC FUNGUS STN: NORMAL
GLUCOSE BLD STRIP.AUTO-MCNC: 224 MG/DL (ref 65–99)
GLUCOSE BLD STRIP.AUTO-MCNC: 251 MG/DL (ref 65–99)
GRAM STN SPEC: NORMAL
MYCOBACTERIUM SPEC CULT: NORMAL
POTASSIUM SERPL-SCNC: 3.5 MMOL/L (ref 3.6–5.5)
RHODAMINE-AURAMINE STN SPEC: NORMAL
RHODAMINE-AURAMINE STN SPEC: NORMAL
SIGNIFICANT IND 70042: NORMAL
SITE SITE: NORMAL
SOURCE SOURCE: NORMAL

## 2024-08-30 PROCEDURE — C1713 ANCHOR/SCREW BN/BN,TIS/BN: HCPCS | Performed by: STUDENT IN AN ORGANIZED HEALTH CARE EDUCATION/TRAINING PROGRAM

## 2024-08-30 PROCEDURE — 160035 HCHG PACU - 1ST 60 MINS PHASE I: Performed by: STUDENT IN AN ORGANIZED HEALTH CARE EDUCATION/TRAINING PROGRAM

## 2024-08-30 PROCEDURE — 160009 HCHG ANES TIME/MIN: Performed by: STUDENT IN AN ORGANIZED HEALTH CARE EDUCATION/TRAINING PROGRAM

## 2024-08-30 PROCEDURE — 700111 HCHG RX REV CODE 636 W/ 250 OVERRIDE (IP): Mod: JZ | Performed by: STUDENT IN AN ORGANIZED HEALTH CARE EDUCATION/TRAINING PROGRAM

## 2024-08-30 PROCEDURE — 700101 HCHG RX REV CODE 250: Performed by: STUDENT IN AN ORGANIZED HEALTH CARE EDUCATION/TRAINING PROGRAM

## 2024-08-30 PROCEDURE — 36415 COLL VENOUS BLD VENIPUNCTURE: CPT

## 2024-08-30 PROCEDURE — 502000 HCHG MISC OR IMPLANTS RC 0278: Performed by: STUDENT IN AN ORGANIZED HEALTH CARE EDUCATION/TRAINING PROGRAM

## 2024-08-30 PROCEDURE — 73060 X-RAY EXAM OF HUMERUS: CPT | Mod: LT

## 2024-08-30 PROCEDURE — 160025 RECOVERY II MINUTES (STATS): Performed by: STUDENT IN AN ORGANIZED HEALTH CARE EDUCATION/TRAINING PROGRAM

## 2024-08-30 PROCEDURE — 87075 CULTR BACTERIA EXCEPT BLOOD: CPT

## 2024-08-30 PROCEDURE — 700105 HCHG RX REV CODE 258: Performed by: ANESTHESIOLOGY

## 2024-08-30 PROCEDURE — 87116 MYCOBACTERIA CULTURE: CPT

## 2024-08-30 PROCEDURE — 700101 HCHG RX REV CODE 250: Performed by: ANESTHESIOLOGY

## 2024-08-30 PROCEDURE — 87070 CULTURE OTHR SPECIMN AEROBIC: CPT

## 2024-08-30 PROCEDURE — 87206 SMEAR FLUORESCENT/ACID STAI: CPT

## 2024-08-30 PROCEDURE — 700111 HCHG RX REV CODE 636 W/ 250 OVERRIDE (IP): Performed by: ANESTHESIOLOGY

## 2024-08-30 PROCEDURE — 84132 ASSAY OF SERUM POTASSIUM: CPT

## 2024-08-30 PROCEDURE — 700111 HCHG RX REV CODE 636 W/ 250 OVERRIDE (IP): Mod: JZ | Performed by: ANESTHESIOLOGY

## 2024-08-30 PROCEDURE — 700102 HCHG RX REV CODE 250 W/ 637 OVERRIDE(OP): Performed by: ANESTHESIOLOGY

## 2024-08-30 PROCEDURE — 160029 HCHG SURGERY MINUTES - 1ST 30 MINS LEVEL 4: Performed by: STUDENT IN AN ORGANIZED HEALTH CARE EDUCATION/TRAINING PROGRAM

## 2024-08-30 PROCEDURE — 700105 HCHG RX REV CODE 258: Performed by: STUDENT IN AN ORGANIZED HEALTH CARE EDUCATION/TRAINING PROGRAM

## 2024-08-30 PROCEDURE — A9270 NON-COVERED ITEM OR SERVICE: HCPCS | Performed by: ANESTHESIOLOGY

## 2024-08-30 PROCEDURE — 87205 SMEAR GRAM STAIN: CPT | Mod: 91

## 2024-08-30 PROCEDURE — 160048 HCHG OR STATISTICAL LEVEL 1-5: Performed by: STUDENT IN AN ORGANIZED HEALTH CARE EDUCATION/TRAINING PROGRAM

## 2024-08-30 PROCEDURE — 110371 HCHG SHELL REV 272: Performed by: STUDENT IN AN ORGANIZED HEALTH CARE EDUCATION/TRAINING PROGRAM

## 2024-08-30 PROCEDURE — 87015 SPECIMEN INFECT AGNT CONCNTJ: CPT

## 2024-08-30 PROCEDURE — 87102 FUNGUS ISOLATION CULTURE: CPT

## 2024-08-30 PROCEDURE — 160041 HCHG SURGERY MINUTES - EA ADDL 1 MIN LEVEL 4: Performed by: STUDENT IN AN ORGANIZED HEALTH CARE EDUCATION/TRAINING PROGRAM

## 2024-08-30 PROCEDURE — 160002 HCHG RECOVERY MINUTES (STAT): Performed by: STUDENT IN AN ORGANIZED HEALTH CARE EDUCATION/TRAINING PROGRAM

## 2024-08-30 PROCEDURE — 82962 GLUCOSE BLOOD TEST: CPT

## 2024-08-30 PROCEDURE — 160046 HCHG PACU - 1ST 60 MINS PHASE II: Performed by: STUDENT IN AN ORGANIZED HEALTH CARE EDUCATION/TRAINING PROGRAM

## 2024-08-30 DEVICE — IMPLANTABLE DEVICE: Type: IMPLANTABLE DEVICE | Site: ARM | Status: FUNCTIONAL

## 2024-08-30 DEVICE — WIRE FIXATION KIRSCHNER L150 MM OD1.6 MM: Type: IMPLANTABLE DEVICE | Site: ARM | Status: FUNCTIONAL

## 2024-08-30 DEVICE — 3.5MM CORTEX TI SCREW 3.5MM L40MM: Type: IMPLANTABLE DEVICE | Site: ARM | Status: FUNCTIONAL

## 2024-08-30 DEVICE — 3.5MM CORTEX TI SCREW 3.5MM L32MM: Type: IMPLANTABLE DEVICE | Site: ARM | Status: FUNCTIONAL

## 2024-08-30 DEVICE — SCREW BONE L36MM DIA3.5MM STANDARD CORTICAL TITANIUM SELF TAPPING LOCKING LOW PROFILE AXSOS 3: Type: IMPLANTABLE DEVICE | Site: ARM | Status: FUNCTIONAL

## 2024-08-30 DEVICE — 3.5MM CORTEX TI SCREW 3.5MM L34MM: Type: IMPLANTABLE DEVICE | Site: ARM | Status: FUNCTIONAL

## 2024-08-30 RX ORDER — DEXTROSE MONOHYDRATE 25 G/50ML
25 INJECTION, SOLUTION INTRAVENOUS
Status: DISCONTINUED | OUTPATIENT
Start: 2024-08-30 | End: 2024-08-30 | Stop reason: HOSPADM

## 2024-08-30 RX ORDER — SODIUM CHLORIDE 9 MG/ML
INJECTION, SOLUTION INTRAVENOUS
Status: DISCONTINUED | OUTPATIENT
Start: 2024-08-30 | End: 2024-08-30 | Stop reason: SURG

## 2024-08-30 RX ORDER — BUPIVACAINE HYDROCHLORIDE AND EPINEPHRINE 5; 5 MG/ML; UG/ML
INJECTION, SOLUTION EPIDURAL; INTRACAUDAL; PERINEURAL
Status: DISCONTINUED | OUTPATIENT
Start: 2024-08-30 | End: 2024-08-30 | Stop reason: HOSPADM

## 2024-08-30 RX ORDER — ONDANSETRON 2 MG/ML
4 INJECTION INTRAMUSCULAR; INTRAVENOUS
Status: COMPLETED | OUTPATIENT
Start: 2024-08-30 | End: 2024-08-30

## 2024-08-30 RX ORDER — HALOPERIDOL 5 MG/ML
1 INJECTION INTRAMUSCULAR
Status: DISCONTINUED | OUTPATIENT
Start: 2024-08-30 | End: 2024-08-30 | Stop reason: HOSPADM

## 2024-08-30 RX ORDER — LIDOCAINE HYDROCHLORIDE 20 MG/ML
INJECTION, SOLUTION EPIDURAL; INFILTRATION; INTRACAUDAL; PERINEURAL PRN
Status: DISCONTINUED | OUTPATIENT
Start: 2024-08-30 | End: 2024-08-30 | Stop reason: SURG

## 2024-08-30 RX ORDER — TOBRAMYCIN 1.2 G/30ML
INJECTION, POWDER, LYOPHILIZED, FOR SOLUTION INTRAVENOUS
Status: DISCONTINUED | OUTPATIENT
Start: 2024-08-30 | End: 2024-08-30 | Stop reason: HOSPADM

## 2024-08-30 RX ORDER — CEFAZOLIN SODIUM 1 G/3ML
INJECTION, POWDER, FOR SOLUTION INTRAMUSCULAR; INTRAVENOUS PRN
Status: DISCONTINUED | OUTPATIENT
Start: 2024-08-30 | End: 2024-08-30 | Stop reason: SURG

## 2024-08-30 RX ORDER — HYDROMORPHONE HYDROCHLORIDE 2 MG/ML
INJECTION, SOLUTION INTRAMUSCULAR; INTRAVENOUS; SUBCUTANEOUS PRN
Status: DISCONTINUED | OUTPATIENT
Start: 2024-08-30 | End: 2024-08-30 | Stop reason: SURG

## 2024-08-30 RX ORDER — SODIUM CHLORIDE 9 MG/ML
INJECTION, SOLUTION INTRAVENOUS ONCE
Status: COMPLETED | OUTPATIENT
Start: 2024-08-30 | End: 2024-08-30

## 2024-08-30 RX ORDER — HYDROMORPHONE HYDROCHLORIDE 1 MG/ML
0.4 INJECTION, SOLUTION INTRAMUSCULAR; INTRAVENOUS; SUBCUTANEOUS
Status: DISCONTINUED | OUTPATIENT
Start: 2024-08-30 | End: 2024-08-30 | Stop reason: HOSPADM

## 2024-08-30 RX ORDER — OXYCODONE HYDROCHLORIDE 5 MG/1
5 TABLET ORAL EVERY 6 HOURS PRN
Status: ON HOLD | COMMUNITY
End: 2024-08-30

## 2024-08-30 RX ORDER — DOXYCYCLINE HYCLATE 100 MG
100 TABLET ORAL 2 TIMES DAILY
Qty: 14 TABLET | Refills: 0 | Status: SHIPPED | OUTPATIENT
Start: 2024-08-30 | End: 2024-09-06

## 2024-08-30 RX ORDER — SODIUM CHLORIDE, SODIUM LACTATE, POTASSIUM CHLORIDE, CALCIUM CHLORIDE 600; 310; 30; 20 MG/100ML; MG/100ML; MG/100ML; MG/100ML
INJECTION, SOLUTION INTRAVENOUS
Status: DISCONTINUED | OUTPATIENT
Start: 2024-08-30 | End: 2024-08-30

## 2024-08-30 RX ORDER — DIPHENHYDRAMINE HYDROCHLORIDE 50 MG/ML
12.5 INJECTION INTRAMUSCULAR; INTRAVENOUS
Status: DISCONTINUED | OUTPATIENT
Start: 2024-08-30 | End: 2024-08-30 | Stop reason: HOSPADM

## 2024-08-30 RX ORDER — GLYCOPYRROLATE 0.2 MG/ML
INJECTION INTRAMUSCULAR; INTRAVENOUS PRN
Status: DISCONTINUED | OUTPATIENT
Start: 2024-08-30 | End: 2024-08-30 | Stop reason: SURG

## 2024-08-30 RX ORDER — CEFAZOLIN SODIUM 1 G/3ML
2 INJECTION, POWDER, FOR SOLUTION INTRAMUSCULAR; INTRAVENOUS ONCE
Status: DISCONTINUED | OUTPATIENT
Start: 2024-08-30 | End: 2024-08-30 | Stop reason: HOSPADM

## 2024-08-30 RX ORDER — HYDROCODONE BITARTRATE AND ACETAMINOPHEN 7.5; 325 MG/15ML; MG/15ML
15 SOLUTION ORAL
Status: COMPLETED | OUTPATIENT
Start: 2024-08-30 | End: 2024-08-30

## 2024-08-30 RX ORDER — HYDROCODONE BITARTRATE AND ACETAMINOPHEN 7.5; 325 MG/15ML; MG/15ML
30 SOLUTION ORAL
Status: COMPLETED | OUTPATIENT
Start: 2024-08-30 | End: 2024-08-30

## 2024-08-30 RX ORDER — HYDROMORPHONE HYDROCHLORIDE 1 MG/ML
0.2 INJECTION, SOLUTION INTRAMUSCULAR; INTRAVENOUS; SUBCUTANEOUS
Status: DISCONTINUED | OUTPATIENT
Start: 2024-08-30 | End: 2024-08-30 | Stop reason: HOSPADM

## 2024-08-30 RX ORDER — VANCOMYCIN HYDROCHLORIDE 1 G/20ML
INJECTION, POWDER, LYOPHILIZED, FOR SOLUTION INTRAVENOUS
Status: COMPLETED | OUTPATIENT
Start: 2024-08-30 | End: 2024-08-30

## 2024-08-30 RX ORDER — HYDROMORPHONE HYDROCHLORIDE 1 MG/ML
0.1 INJECTION, SOLUTION INTRAMUSCULAR; INTRAVENOUS; SUBCUTANEOUS
Status: DISCONTINUED | OUTPATIENT
Start: 2024-08-30 | End: 2024-08-30 | Stop reason: HOSPADM

## 2024-08-30 RX ORDER — NEOSTIGMINE METHYLSULFATE 1 MG/ML
INJECTION INTRAVENOUS PRN
Status: DISCONTINUED | OUTPATIENT
Start: 2024-08-30 | End: 2024-08-30 | Stop reason: SURG

## 2024-08-30 RX ADMIN — ONDANSETRON 4 MG: 2 INJECTION INTRAMUSCULAR; INTRAVENOUS at 11:41

## 2024-08-30 RX ADMIN — FENTANYL CITRATE 50 MCG: 50 INJECTION, SOLUTION INTRAMUSCULAR; INTRAVENOUS at 11:45

## 2024-08-30 RX ADMIN — GLYCOPYRROLATE 0.8 MG: 0.2 INJECTION INTRAMUSCULAR; INTRAVENOUS at 10:15

## 2024-08-30 RX ADMIN — HYDROCODONE BITARTRATE AND ACETAMINOPHEN 15 MG: 7.5; 325 SOLUTION ORAL at 10:33

## 2024-08-30 RX ADMIN — INSULIN HUMAN 6 UNITS: 100 INJECTION, SOLUTION PARENTERAL at 08:04

## 2024-08-30 RX ADMIN — INSULIN HUMAN 3 UNITS: 100 INJECTION, SOLUTION PARENTERAL at 10:43

## 2024-08-30 RX ADMIN — HYDROMORPHONE HYDROCHLORIDE 1 MG: 2 INJECTION INTRAMUSCULAR; INTRAVENOUS; SUBCUTANEOUS at 10:15

## 2024-08-30 RX ADMIN — SODIUM CHLORIDE: 9 INJECTION, SOLUTION INTRAVENOUS at 07:09

## 2024-08-30 RX ADMIN — HYDROMORPHONE HYDROCHLORIDE 1 MG: 2 INJECTION INTRAMUSCULAR; INTRAVENOUS; SUBCUTANEOUS at 10:20

## 2024-08-30 RX ADMIN — NEOSTIGMINE METHYLSULFATE 5 MG: 1 INJECTION INTRAVENOUS at 10:15

## 2024-08-30 RX ADMIN — LIDOCAINE HYDROCHLORIDE 100 MG: 20 INJECTION, SOLUTION EPIDURAL; INFILTRATION; INTRACAUDAL at 08:42

## 2024-08-30 RX ADMIN — CEFAZOLIN 2 G: 1 INJECTION, POWDER, FOR SOLUTION INTRAMUSCULAR; INTRAVENOUS at 08:42

## 2024-08-30 RX ADMIN — SODIUM CHLORIDE: 9 INJECTION, SOLUTION INTRAVENOUS at 08:37

## 2024-08-30 RX ADMIN — ROCURONIUM BROMIDE 50 MG: 10 INJECTION, SOLUTION INTRAVENOUS at 08:42

## 2024-08-30 RX ADMIN — PROPOFOL 200 MG: 10 INJECTION, EMULSION INTRAVENOUS at 08:42

## 2024-08-30 ASSESSMENT — PAIN DESCRIPTION - PAIN TYPE
TYPE: SURGICAL PAIN

## 2024-08-30 ASSESSMENT — FIBROSIS 4 INDEX: FIB4 SCORE: 0.75

## 2024-08-30 NOTE — LETTER
August 28, 2024    Patient Name: Beni Moore  Surgeon Name: Aris Pierre M.D.  Surgery Facility: [facility] ([address] [place] [address])  Surgery Date: 8/30/2024    The time of your surgery is not final and may change up to and until the day of your surgery. You will be contacted 24-48 hours prior to your surgery date with your check-in and surgery time.    If you will not be at one of the below numbers please call the surgery scheduler at [phone]  Preferred Phone: [phone]    BEFORE YOUR SURGERY   Pre Registration and/or Lab Work must be done within and no earlier than 28 days prior to your surgery date. Your scheduled facility will contact you regarding all required preregistration and/or lab work. If you have not been contacted within 7 days of your scheduled procedure please call [facility] at [phone] for an appointment as soon as possible.    DAY OF YOUR SURGERY  Nothing to eat or drink after midnight     Refrain from smoking any substance after midnight prior to surgery. Smoking may interfere with the anesthetic and frequently produces nausea during the recovery period.    Continue taking all lifesaving medications. Including the morning of your surgery with small sip of water.    Please do NOT take on the day of surgery:  Diuretics: examples- furosemide (Lasix), spironolactone, hydrochlorothiazide  ACE-inhibitors: examples- lisinopril, ramipril, enalapril  “ARBs”: examples- losartan, Olmesartan, valsartan    Please arrive at the hospital/surgery center at the check-in time provided.     An adult will need to bring you and take you home after your surgery.     AFTER YOUR SURGERY  Post op Appointment:   Date: 9.11.24   Time: 1:45 PM   With: Arthur José PA-C    Location: [address] [place] [id number]    - Post Surgery - You will need someone to drive you home  - Post Surgery - You will need someone to stay with you for 24 hours     TIME OFF WORK  [facility]  or Disability forms can be faxed directly to: [phone] or you may drop them off at [address] [place] [address]. Our office charges a $35.00 fee per form. Forms will be completed within 10 business days of drop off and payment received. For the status of your forms you may contact our disability office directly [phone].    MEDICATION INSTRUCTIONS **Please read section completely**  The following medications should be stopped a minimum of 10 days prior to surgery:  All over the counter, Supplements & Herbal medications    Anorectics: Phentermine (Adipex-P, Lomaira and Suprenza), Phentermine-topiramate (Qsymia), Bupropion-naltrexone (Contrave)    **If you are on Bupropion for anxiety/depression, please continue this medication up until the day of surgery.     Opiod Partial Agonists/Opioid Antagonists: Buprenorphine (Suboxone, Belbuca, Butrans, Probuphine Implant, Sublocade), Naltrexone (ReVia, Vivitrol), Naloxone    Amphetamines: Dextroamphetamine/Amphetamine (Adderall, Mydayis), Methylphenidate Hydrochloride (Concerta, Metadate, Methylin, Ritalin)    The following medications should be stopped 5 days prior to surgery:  Blood Thinners: Any Aspirin, Aspirin products, anti-inflammatories such as ibuprofen and any blood thinners such as Coumadin and Plavix. Please consult your prescribing physician if you are on life saving blood thinners, in regards to when to stop medications prior to surgery.     The following medications should be stopped a minimum of 3 days prior to surgery:  PDE-5 inhibitors: Sildenafil (Viagra), Tadalafil (Cialis), Vardenafil (Levitra), Avanafil (Stendra)    MAO Inhibitors: Rasagiline (Azilect), Selegiline (Eldepryl, Emsam, Selapar), Isocarboxazid (Marplan), Phenelzine (Nardil)

## 2024-08-30 NOTE — OP REPORT
DATE OF OPERATION: 8/30/2024     PREOPERATIVE DIAGNOSIS: Left proximal humerus nonunion    POSTOPERATIVE DIAGNOSIS: Same    PROCEDURE PERFORMED: Revision open reduction internal fixation repair of left proximal humerus nonunion with bone graft    SURGEON: Aris Pierre M.D.     ASSISTANT: None    ANESTHESIA: General    SPECIMEN: None    ESTIMATED BLOOD LOSS: 150     IMPLANTS: Parmele 3.5 mm small frag plate, Holley proximal humerus locking plate mL      INDICATIONS: The patient is a 54 y.o. male who presented with above.  I discussed the risks and benefits of the procedure which include but are not limited to risks of infection, wound healing complication, neurovascular injury, pain, malunion, non-union, malrotation, and the medical risks of anesthesia including MI, stroke, and death.  Alternatives to surgery were also discussed, including non-operative management, which I did not recommend.  The patient was in agreement with the plan to proceed, and the informed consent was signed and documented.  I met with the patient pre-operatively and marked the operative extremity with their agreement.  We proceeded to the operating room.     DESCRIPTION OF PROCEDURE:  Patient was seen in the preoperative holding area on the day of surgery. The operative site was marked with my initials.  he was taken to the operating room and placed supine on the operative table.  Anesthesia was induced.  The operative extremity was prepped and draped in the normal sterile fashion.  Operative pause was conducted and the correct patient, site, side, procedure, and surgeon's initials on extremity were identified.  Previous anterior incision was reincised.  Bovie cautery used dissect down to the fascia.  Deltopectoral interval was identified and utilized to gain access to there is a large amount of scar tissue.  We came down on the previously placed plate which was loose.  The locking screws and nonlocking screws were removed.  Plate was  removed.  There is 1 distal screw that was broken that was left.  The fracture site was identified and a rongeur was used to remove the fibrous nonunion material that was obtained and sent for culture.  I then used a drill to recannulate the canal leading to healthy bleeding bone.  There is small amount of heterotopic ossification distally which was rongeured off.  We used this autograft back into the previous nonunion site.  At this point in time the fracture site was noted be mobile.  He has an apex anterior deformity on the lateral view which was corrected usingWell-placed towel bump.  We then placed a 3.5 mm LCP plate anteriorly.  Several nonlocking screws were drilled and placed in an eccentric fashion along compressing the nonunion site.  Next we placed a longer proximal humerus locking plate into position.  This was held provisionally with K wires.  This checked on AP and lateral views.  Once in appropriate position we drilled and placed a single nonlocking screw into the shaft compressing the plate to the bone.  We then drilled a cancellous screw into the head neck region again compressing the plate proximally.  Next we drilled and placed multiple locking screws in the head region with good purchase.  This was followed by several nonlocking screws distally.  Finally we packed cortical cancellous allograft into the nonunion site.  Final images were obtained again appropriate reduction and implant position.  The wound was irrigated sterile saline.  Vancomycin tobramycin powder placed in the wound wound was closed in layered fashion.  Sterile dressing was placed placed in a sling.  Is awoken taken to PACU stable condition.    POSTOPERATIVE PLAN: Nonweightbearing left upper extremity.  Sling at all times.  Discharge home with oral antibiotics.  He states he has pain medication at home.  The patient will follow up in clinic in 2 weeks to check wounds and remove  james/staples.      ____________________________________   Aris Pierre M.D.   DD: 8/30/2024  10:20 AM

## 2024-08-30 NOTE — H&P
Surgery Orthopedic History & Physical Note    Date  8/30/2024    Primary Care Physician  Florinda Pascual M.D.    CC  Pre-Op Diagnosis Codes:      * Proximal humerus fracture [S42.209A]    HPI  CC: Pain of the Left Shoulder        HPI: Beni Moore is a 54 y.o. male who is following today for evaluation left proximal humerus.  He underwent ORIF back in December.  Shortly thereafter he had a BKA and was forced to use his arm more than he would have preferred resulting in malunion and hardware cut out.  He is back up and ambulatory and just got back from an antelope hunt with his mom where she was successful in getting a good animal.  HPI                    PMH:   Past Medical History        Past Medical History:   Diagnosis Date    Anesthesia       Hypotension post-op, persisted for a few months    Anesthesia 12/06/2023     Patient stated after 2016 colonoscopy he became hypotensive.    Bowel habit changes       History GI problems    Cataract       IOL - Left eye    Cataract       IOL OD    Deaf, right      Diabetes (HCC)       Oral medications & insulin    Heart burn       GERD; takes Prilosec    Hemorrhagic disorder (HCC)       ASA protection for stent and cardiac history    History of non-ST elevation myocardial infarction (NSTEMI)       Per Problem List    Hx of heart artery stent 2019    Hyperlipidemia      Hyperparathyroidism due to renal insufficiency (HCC)       Per Problem List    Hypertension      Myocardial infarct (HCC)      Pneumonia       H/O    Renal disorder 12/06/2023     Stage 3 CKD            PSH:   Past Surgical History         Past Surgical History:   Procedure Laterality Date    KNEE AMPUTATION BELOW Left 2/1/2024     Procedure: AMPUTATION, BELOW KNEE;  Surgeon: Celestino Segura M.D.;  Location: SURGERY [facility];  Service: Orthopedics    TOE AMPUTATION Left 1/30/2024     Procedure: AMPUTATION, TOE- 5TH;  Surgeon: Celestino Segura M.D.;  Location: SURGERY [facility];  Service:  Orthopedics    PB OPEN TREATMENT PBOX HUMERAL FRACTURE Left 12/11/2023     Procedure: LEFT OPEN REDUCTION INTERNAL FIXATION  HUMERUS, PROXIMAL AND LEFT BICEPS TENODESIS;  Surgeon: Aris Pierre M.D.;  Location: SURGERY [facility];  Service: Orthopedics    PB REPAIR BICEPS LONG TENDON Left 12/11/2023     Procedure: TENODESIS, BICEPS, OPEN;  Surgeon: Aris Pierre M.D.;  Location: SURGERY [facility];  Service: Orthopedics    CATARACT EXTRACTION WITH IOL Right 2023    TOE AMPUTATION Left 09/30/2021     Procedure: AMPUTATION, TOE;  Surgeon: Tomer Hart M.D.;  Location: SURGERY [facility];  Service: Orthopedics    STENT PLACEMENT   2019     STEMI with nonobstructive LAD    TOE AMPUTATION Right 07/02/2018     Procedure: Transmetatarsal amputation;  Surgeon: Ravi Bernardo M.D.;  Location: SURGERY [facility];  Service: Orthopedics    ACHILLES TENDON REPAIR Right 07/02/2018     Procedure: ACHILLES LENGTHENING;  Surgeon: Ravi Bernardo M.D.;  Location: SURGERY [facility];  Service: Orthopedics    IRRIGATION & DEBRIDEMENT ORTHO Right 07/02/2018     Procedure: IRRIGATION & DEBRIDEMENT ORTHO;  Surgeon: Ravi Bernardo M.D.;  Location: SURGERY [facility];  Service: Orthopedics    OTHER Left       IOL    OTHER ABDOMINAL SURGERY        OTHER ORTHOPEDIC SURGERY        TONSILLECTOMY        VASECTOMY                FH:   Family History         Family History   Problem Relation Age of Onset    No Known Problems Mother      Diabetes Father      Heart Disease Father      Diabetes Maternal Grandmother      Heart Disease Maternal Grandfather      Lung Disease Paternal Grandmother      Cancer Paternal Grandmother      Cancer Paternal Grandfather      Lung Cancer Paternal Grandfather      Kidney Disease Neg Hx              SH:   Social History               Socioeconomic History    Marital status:        Spouse name: Not on file    Number of children: Not on file    Years of education: Not on  file    Highest education level: Some college, no degree   Occupational History    Not on file   Tobacco Use    Smoking status: Former       Current packs/day: 0.00       Average packs/day: 0.5 packs/day for 10.0 years (5.0 ttl pk-yrs)       Types: Cigarettes       Start date:        Quit date:        Years since quittin.6    Smokeless tobacco: Never   Vaping Use    Vaping status: Never Used   Substance and Sexual Activity    Alcohol use: No    Drug use: Not Currently       Types: Marijuana, Oral       Comment: CBD Edibles 3-4 times per week    Sexual activity: Not Currently       Partners: Female   Other Topics Concern    Not on file   Social History Narrative    Not on file      Social Determinants of Health           Financial Resource Strain: Low Risk  (2022)     Overall Financial Resource Strain (CARDIA)      Difficulty of Paying Living Expenses: Not hard at all   Food Insecurity: No Food Insecurity (2022)     Hunger Vital Sign      Worried About Running Out of Food in the Last Year: Never true      Ran Out of Food in the Last Year: Never true   Transportation Needs: No Transportation Needs (2024)     PRAPARE - Transportation      Lack of Transportation (Medical): No      Lack of Transportation (Non-Medical): No   Physical Activity: Insufficiently Active (2022)     Exercise Vital Sign      Days of Exercise per Week: 2 days      Minutes of Exercise per Session: 40 min   Stress: No Stress Concern Present (2022)     [employer] - Occupational Stress Questionnaire      Feeling of Stress : Not at all   Social Connections: Moderately Integrated (2022)     Social Connection and Isolation Panel [NHANES]      Frequency of Communication with Friends and Family: More than three times a week      Frequency of Social Gatherings with Friends and Family: More than three times a week      Attends Confucianism Services: Never      Active Member of Clubs or  Organizations: Yes      Attends Club or Organization Meetings: Never      Marital Status:    Intimate Partner Violence: Not on file   Housing Stability: Low Risk  (12/13/2022)     Housing Stability Vital Sign      Unable to Pay for Housing in the Last Year: No      Number of Places Lived in the Last Year: 1      Unstable Housing in the Last Year: No            ROS: Review of Systems     Wt 89.9 kg (198 lb 3.2 oz)      Physical Exam:  General: Well nourished, well developed, age appropriate appearance   HEENT: Normocephalic, atraumatic  Psych: Normal mood and affect  Neck: Supple, no pain to motion  Chest/Pulmonary: breathing unlabored, no audible wheezing  Cardio: Well perfused extremities  Neuro: Sensation grossly intact to BUE and BLE, moving all four extremities  Skin: Intact with no full thickness abrasions or lacerations  MSK: He can he can forward flex to perhaps 45 degrees or so abduction is out to about 60.  He is neurovascular intact distal.     Imaging and labs:   DX-SHOULDER 2+  AP and lateral views left shoulder status post open reduction intra   fixation proximal humerus demonstrates broken hardware and apex anterior   angulation of his nonunion                 Assessment & Plan  Encounter Diagnoses:   Closed 3-part fracture of surgical neck of left humerus with nonunion, subsequent encounter     Closed 3-part fracture of proximal end of left humerus, initial encounter     Plan for revision ORIF today to allow weight bearing. Risks discussed at length. Discharge home from pacu today post op    Assessment/Plan:  Pre-Op Diagnosis Codes:      * Proximal humerus fracture [S42.209A]  Procedure(s):  LEFT OPEN REDUCTION INTERNAL FIXATION  HUMERUS, PROXIMAL

## 2024-08-30 NOTE — PROGRESS NOTES
Medication history reviewed with PT at bedside    [facility] is complete per PT reporting    Allergies reviewed.     Patient denies any outpatient antibiotics in the last 30 days.     Patient is not taking anticoagulants.    PT receives dialysis at [facility] ([phone]) on Wed only. Last treatment was 08/28/2024. PT receives Mircera every 14 days. Last dose was 30mcg on 08/28/2024. PT does not receive antibiotics in dialysis.    Preferred pharmacy for this visit - [facility] in [place] ([phone])

## 2024-08-30 NOTE — ANESTHESIA PROCEDURE NOTES
Airway    Date/Time: 8/30/2024 8:42 AM    Performed by: Gurpreet Patton M.D.  Authorized by: Gurpreet Patton M.D.    Location:  OR  Urgency:  Elective  Indications for Airway Management:  Anesthesia      Spontaneous Ventilation: absent    Sedation Level:  Deep  Preoxygenated: Yes    Patient Position:  Sniffing  Final Airway Type:  Endotracheal airway  Final Endotracheal Airway:  ETT  Cuffed: Yes    Technique Used for Successful ETT Placement:  Direct laryngoscopy    Insertion Site:  Oral  Blade Type:  Hung  Laryngoscope Blade/Videolaryngoscope Blade Size:  3  ETT Size (mm):  8.0  Measured from:  Teeth  ETT to Teeth (cm):  24  Placement Verified by: auscultation and capnometry    Cormack-Lehane Classification:  Grade I - full view of glottis  Number of Attempts at Approach:  1  Ventilation Between Attempts:  None

## 2024-08-30 NOTE — ANESTHESIA TIME REPORT
Anesthesia Start and Stop Event Times       Date Time Event    8/30/2024 0822 Ready for Procedure     0837 Anesthesia Start     1037 Anesthesia Stop          Responsible Staff  08/30/24      Name Role Begin End    Gurpreet Patton M.D. Anesth 0837 1037          Overtime Reason:  no overtime (within assigned shift)    Comments:

## 2024-08-30 NOTE — ANESTHESIA PREPROCEDURE EVALUATION
Case: [id number] Date/Time: 08/30/24 0745    Procedure: LEFT OPEN REDUCTION INTERNAL FIXATION  HUMERUS, PROXIMAL (Left: Arm Upper)    Anesthesia type: General    Diagnosis: Proximal humerus fracture [S42.209A]    Pre-op diagnosis: Proximal humerus fracture [S42.209A]    Location: [place] [address] / SURGERY [facility]    Surgeons: Aris Pierre M.D.            Relevant Problems   CARDIAC   (positive) Coronary artery disease involving native coronary artery of native heart without angina pectoris   (positive) Hypertension   (positive) NSTEMI (non-ST elevated myocardial infarction) (HCC)   (positive) Presence of stent in coronary artery         (positive) Acute renal failure superimposed on stage 3b chronic kidney disease (HCC)   (positive) CKD (chronic kidney disease) stage 5, GFR less than 15 ml/min (HCC)   (positive) Hypertensive heart and chronic kidney disease with heart failure and stage 1 through stage 4 chronic kidney disease, or unspecified chronic kidney disease (HCC)      ENDO   (positive) Type 2 diabetes mellitus with kidney complication, with long-term current use of insulin (HCC)   (positive) Type 2 diabetes mellitus with proliferative retinopathy without macular edema, without long-term current use of insulin, unspecified laterality (HCC)      Other   (positive) Osteomyelitis and soft tissue infection of left foot (HCC)       Physical Exam    Airway   Mallampati: III  TM distance: <3 FB  Neck ROM: full       Cardiovascular - normal exam  Rhythm: regular  Rate: normal  (-) murmur     Dental - normal exam           Pulmonary - normal exam  Breath sounds clear to auscultation     Abdominal    Neurological - normal exam                   Anesthesia Plan    ASA 3   ASA physical status 3 criteria: diabetes - poorly controlled and ESRD undergoing regularly scheduled dialysis    Plan - general       Airway plan will be ETT          Induction: intravenous      Pertinent diagnostic labs and testing  reviewed    Informed Consent:    Anesthetic plan and risks discussed with patient.

## 2024-08-30 NOTE — DISCHARGE INSTRUCTIONS
HOME CARE INSTRUCTIONS    ACTIVITY: Rest and take it easy for the first 24 hours.  A responsible adult is recommended to remain with you during that time.  It is normal to feel sleepy.  We encourage you to not do anything that requires balance, judgment or coordination.    FOR 24 HOURS DO NOT:  Drive, operate machinery or run household appliances.  Drink beer or alcoholic beverages.  Make important decisions or sign legal documents.    SPECIAL INSTRUCTIONS:   No lifting with your left shoulder until your follow-up.  Keep the sling on for the next couple weeks.  Dressings may come off 3 days after surgery, okay to shower at that time.  No bathing or soaking the wound.  Continue moving your elbow wrist and hand to avoid stiffness.  Take pain medication as needed for the first couple days and switch to Tylenol when able.  Call to schedule follow-up appointment 2 weeks after surgery for wound check staple removal.    DIET: To avoid nausea, slowly advance diet as tolerated, avoiding spicy or greasy foods for the first day.  Add more substantial food to your diet according to your physician's instructions.  Babies can be fed formula or breast milk as soon as they are hungry.  INCREASE FLUIDS AND FIBER TO AVOID CONSTIPATION.    SURGICAL DRESSING/BATHING: See above.     MEDICATIONS: Resume taking daily medication.  Take prescribed pain medication with food.  If no medication is prescribed, you may take non-aspirin pain medication if needed.  PAIN MEDICATION CAN BE VERY CONSTIPATING.  Take a stool softener or laxative such as senokot, pericolace, or milk of magnesia if needed.    Prescription given for Doxycycline.  Last pain medication given Norco at 10:30am    A follow-up appointment should be arranged with your doctor in 1-2 weeks; call to schedule.    You should CALL YOUR PHYSICIAN if you develop:  Fever greater than 101 degrees F.  Pain not relieved by medication, or persistent nausea or vomiting.  Excessive bleeding  (blood soaking through dressing) or unexpected drainage from the wound.  Extreme redness or swelling around the incision site, drainage of pus or foul smelling drainage.  Inability to urinate or empty your bladder within 8 hours.  Problems with breathing or chest pain.    You should call 911 if you develop problems with breathing or chest pain.  If you are unable to contact your doctor or surgical center, you should go to the nearest emergency room or urgent care center.  Physician's telephone #: Dr. Pierre [phone]    MILD FLU-LIKE SYMPTOMS ARE NORMAL.  YOU MAY EXPERIENCE GENERALIZED MUSCLE ACHES, THROAT IRRITATION, HEADACHE AND/OR SOME NAUSEA.    If any questions arise, call your doctor.  If your doctor is not available, please feel free to call the Surgical Center at [phone].  The Center is open Monday through Friday from 7AM to 7PM.      A registered nurse may call you a few days after your surgery to see how you are doing after your procedure.    You may also receive a survey in the mail within the next two weeks and we ask that you take a few moments to complete the survey and return it to us.  Our goal is to provide you with very good care and we value your comments.     Depression / Suicide Risk    As you are discharged from this [facility] facility, it is important to learn how to keep safe from harming yourself.    Recognize the warning signs:  Abrupt changes in personality, positive or negative- including increase in energy   Giving away possessions  Change in eating patterns- significant weight changes-  positive or negative  Change in sleeping patterns- unable to sleep or sleeping all the time   Unwillingness or inability to communicate  Depression  Unusual sadness, discouragement and loneliness  Talk of wanting to die  Neglect of personal appearance   Rebelliousness- reckless behavior  Withdrawal from people/activities they love  Confusion- inability to concentrate     If you or a loved  one observes any of these behaviors or has concerns about self-harm, here's what you can do:  Talk about it- your feelings and reasons for harming yourself  Remove any means that you might use to hurt yourself (examples: pills, rope, extension cords, firearm)  Get professional help from the community (Mental Health, Substance Abuse, psychological counseling)  Do not be alone:Call your Safe Contact- someone whom you trust who will be there for you.  Call your local CRISIS HOTLINE 764-0853 or 201-754-3120  Call your local Children's Mobile Crisis Response Team [place] (372) 929-0782 or www.[employer]  Call the toll free National Suicide Prevention Hotlines   National Suicide Prevention Lifeline 085-043-FOCN (8170)  National Hope Line Network 800-SUICIDE (288-5851)    I acknowledge receipt and understanding of these Home Care instructions.

## 2024-08-30 NOTE — OR NURSING
1132-pt arrives via gurney to phase II, VSS.  Refuses medication for 6/10 pian at this time.  Denies nausea. Tolerating clears.     1145-pt medicated per MAR for pain and nausea.    1205-pt assisted with dressing. Discharge instructions given to pt and son- verbalize understanding  PIV d/c'd and pt escorted out in personal w/c, all belongings accounted for.

## 2024-08-30 NOTE — ANESTHESIA POSTPROCEDURE EVALUATION
Patient: Beni Moore    Procedure Summary       Date: 08/30/24 Room / Location: [facility] 06 / SURGERY [facility]    Anesthesia Start: 0837 Anesthesia Stop: 1037    Procedure: LEFT OPEN REDUCTION INTERNAL FIXATION  HUMERUS, PROXIMAL, NON UNION REPAIR (Left: Arm Upper) Diagnosis:       Proximal humerus fracture      (Proximal humerus nonunion)    Surgeons: Aris Pierre M.D. Responsible Provider: Gurpreet Patton M.D.    Anesthesia Type: general ASA Status: 3            Final Anesthesia Type: general  Last vitals  BP   Blood Pressure: 124/66    Temp   36.8 °C (98.3 °F)    Pulse   89   Resp   14    SpO2   95 %      Anesthesia Post Evaluation    Patient location during evaluation: PACU  Patient participation: complete - patient participated  Level of consciousness: awake and alert    Airway patency: patent  Anesthetic complications: no  Cardiovascular status: hemodynamically stable  Respiratory status: acceptable  Hydration status: euvolemic    PONV: none          There were no known notable events for this encounter.     Nurse Pain Score: 6 (NPRS)

## 2024-08-31 LAB
BACTERIA SPEC ANAEROBE CULT: NORMAL
BACTERIA TISS AEROBE CULT: NORMAL
GRAM STN SPEC: NORMAL
SIGNIFICANT IND 70042: NORMAL
SIGNIFICANT IND 70042: NORMAL
SITE SITE: NORMAL
SITE SITE: NORMAL
SOURCE SOURCE: NORMAL
SOURCE SOURCE: NORMAL

## 2024-09-02 LAB
BACTERIA TISS AEROBE CULT: NORMAL
GRAM STN SPEC: NORMAL
SIGNIFICANT IND 70042: NORMAL
SITE SITE: NORMAL
SOURCE SOURCE: NORMAL

## 2024-09-13 LAB
BACTERIA SPEC ANAEROBE CULT: NORMAL
SIGNIFICANT IND 70042: NORMAL
SITE SITE: NORMAL
SOURCE SOURCE: NORMAL

## 2024-09-23 LAB
FUNGUS SPEC CULT: NORMAL
FUNGUS SPEC FUNGUS STN: NORMAL
SIGNIFICANT IND 70042: NORMAL
SITE SITE: NORMAL
SOURCE SOURCE: NORMAL

## 2024-10-09 DIAGNOSIS — N52.9 ERECTILE DYSFUNCTION, UNSPECIFIED ERECTILE DYSFUNCTION TYPE: ICD-10-CM

## 2024-10-11 LAB
MYCOBACTERIUM SPEC CULT: NORMAL
RHODAMINE-AURAMINE STN SPEC: NORMAL
SIGNIFICANT IND 70042: NORMAL
SITE SITE: NORMAL
SOURCE SOURCE: NORMAL

## 2024-10-22 ENCOUNTER — OFFICE VISIT (OUTPATIENT)
Dept: VASCULAR SURGERY | Facility: MEDICAL CENTER | Age: 54
End: 2024-10-22
Payer: MEDICARE

## 2024-10-22 VITALS
WEIGHT: 203 LBS | OXYGEN SATURATION: 98 % | HEART RATE: 83 BPM | DIASTOLIC BLOOD PRESSURE: 82 MMHG | HEIGHT: 72 IN | TEMPERATURE: 98.2 F | BODY MASS INDEX: 27.5 KG/M2 | SYSTOLIC BLOOD PRESSURE: 142 MMHG

## 2024-10-22 DIAGNOSIS — E11.69 TYPE 2 DIABETES MELLITUS WITH OTHER SPECIFIED COMPLICATION, UNSPECIFIED WHETHER LONG TERM INSULIN USE (HCC): ICD-10-CM

## 2024-10-22 DIAGNOSIS — N18.6 ESRD (END STAGE RENAL DISEASE) (HCC): ICD-10-CM

## 2024-10-22 DIAGNOSIS — I10 PRIMARY HYPERTENSION: ICD-10-CM

## 2024-10-22 DIAGNOSIS — E78.5 DYSLIPIDEMIA: ICD-10-CM

## 2024-10-22 PROCEDURE — 3077F SYST BP >= 140 MM HG: CPT | Performed by: SURGERY

## 2024-10-22 PROCEDURE — 99204 OFFICE O/P NEW MOD 45 MIN: CPT | Performed by: SURGERY

## 2024-10-22 PROCEDURE — 3079F DIAST BP 80-89 MM HG: CPT | Performed by: SURGERY

## 2024-10-22 ASSESSMENT — FIBROSIS 4 INDEX: FIB4 SCORE: 0.75

## 2024-10-23 ENCOUNTER — TELEPHONE (OUTPATIENT)
Dept: VASCULAR SURGERY | Facility: MEDICAL CENTER | Age: 54
End: 2024-10-23
Payer: MEDICARE

## 2024-10-29 ENCOUNTER — APPOINTMENT (OUTPATIENT)
Dept: ADMISSIONS | Facility: MEDICAL CENTER | Age: 54
End: 2024-10-29
Attending: SURGERY
Payer: MEDICARE

## 2024-10-30 ENCOUNTER — TELEPHONE (OUTPATIENT)
Dept: VASCULAR SURGERY | Facility: MEDICAL CENTER | Age: 54
End: 2024-10-30
Payer: MEDICARE

## 2024-10-31 ENCOUNTER — PRE-ADMISSION TESTING (OUTPATIENT)
Dept: ADMISSIONS | Facility: MEDICAL CENTER | Age: 54
End: 2024-10-31
Attending: SURGERY
Payer: MEDICARE

## 2024-11-01 ENCOUNTER — ANESTHESIA EVENT (OUTPATIENT)
Dept: SURGERY | Facility: MEDICAL CENTER | Age: 54
End: 2024-11-01
Payer: MEDICARE

## 2024-11-04 ENCOUNTER — ANESTHESIA (OUTPATIENT)
Dept: SURGERY | Facility: MEDICAL CENTER | Age: 54
End: 2024-11-04
Payer: MEDICARE

## 2024-11-04 ENCOUNTER — HOSPITAL ENCOUNTER (OUTPATIENT)
Facility: MEDICAL CENTER | Age: 54
End: 2024-11-04
Attending: SURGERY | Admitting: SURGERY
Payer: MEDICARE

## 2024-11-04 VITALS
HEART RATE: 73 BPM | RESPIRATION RATE: 16 BRPM | HEIGHT: 72 IN | DIASTOLIC BLOOD PRESSURE: 85 MMHG | OXYGEN SATURATION: 93 % | BODY MASS INDEX: 26.28 KG/M2 | SYSTOLIC BLOOD PRESSURE: 166 MMHG | TEMPERATURE: 98 F | WEIGHT: 194 LBS

## 2024-11-04 LAB
ANION GAP SERPL CALC-SCNC: 14 MMOL/L (ref 7–16)
BUN SERPL-MCNC: 77 MG/DL (ref 8–22)
CALCIUM SERPL-MCNC: 7.4 MG/DL (ref 8.5–10.5)
CHLORIDE SERPL-SCNC: 100 MMOL/L (ref 96–112)
CO2 SERPL-SCNC: 20 MMOL/L (ref 20–33)
CREAT SERPL-MCNC: 9.81 MG/DL (ref 0.5–1.4)
EKG IMPRESSION: NORMAL
ERYTHROCYTE [DISTWIDTH] IN BLOOD BY AUTOMATED COUNT: 44.3 FL (ref 35.9–50)
GFR SERPLBLD CREATININE-BSD FMLA CKD-EPI: 6 ML/MIN/1.73 M 2
GLUCOSE BLD STRIP.AUTO-MCNC: 102 MG/DL (ref 65–99)
GLUCOSE BLD STRIP.AUTO-MCNC: 94 MG/DL (ref 65–99)
GLUCOSE SERPL-MCNC: 110 MG/DL (ref 65–99)
HCT VFR BLD AUTO: 30.8 % (ref 42–52)
HGB BLD-MCNC: 10.3 G/DL (ref 14–18)
MCH RBC QN AUTO: 30.3 PG (ref 27–33)
MCHC RBC AUTO-ENTMCNC: 33.4 G/DL (ref 32.3–36.5)
MCV RBC AUTO: 90.6 FL (ref 81.4–97.8)
PLATELET # BLD AUTO: 110 K/UL (ref 164–446)
PMV BLD AUTO: 10.4 FL (ref 9–12.9)
POTASSIUM SERPL-SCNC: 3.4 MMOL/L (ref 3.6–5.5)
RBC # BLD AUTO: 3.4 M/UL (ref 4.7–6.1)
SODIUM SERPL-SCNC: 134 MMOL/L (ref 135–145)
WBC # BLD AUTO: 4 K/UL (ref 4.8–10.8)

## 2024-11-04 PROCEDURE — 36821 AV FUSION DIRECT ANY SITE: CPT | Performed by: SURGERY

## 2024-11-04 PROCEDURE — 85027 COMPLETE CBC AUTOMATED: CPT

## 2024-11-04 PROCEDURE — 80048 BASIC METABOLIC PNL TOTAL CA: CPT

## 2024-11-04 PROCEDURE — 36415 COLL VENOUS BLD VENIPUNCTURE: CPT

## 2024-11-04 PROCEDURE — 82962 GLUCOSE BLOOD TEST: CPT

## 2024-11-04 PROCEDURE — 700105 HCHG RX REV CODE 258: Performed by: ANESTHESIOLOGY

## 2024-11-04 PROCEDURE — 700111 HCHG RX REV CODE 636 W/ 250 OVERRIDE (IP): Mod: JZ | Performed by: ANESTHESIOLOGY

## 2024-11-04 PROCEDURE — 160035 HCHG PACU - 1ST 60 MINS PHASE I: Performed by: SURGERY

## 2024-11-04 PROCEDURE — 160039 HCHG SURGERY MINUTES - EA ADDL 1 MIN LEVEL 3: Performed by: SURGERY

## 2024-11-04 PROCEDURE — 160028 HCHG SURGERY MINUTES - 1ST 30 MINS LEVEL 3: Performed by: SURGERY

## 2024-11-04 PROCEDURE — 160025 RECOVERY II MINUTES (STATS): Performed by: SURGERY

## 2024-11-04 PROCEDURE — C1713 ANCHOR/SCREW BN/BN,TIS/BN: HCPCS | Performed by: SURGERY

## 2024-11-04 PROCEDURE — 160046 HCHG PACU - 1ST 60 MINS PHASE II: Performed by: SURGERY

## 2024-11-04 PROCEDURE — 93010 ELECTROCARDIOGRAM REPORT: CPT | Performed by: INTERNAL MEDICINE

## 2024-11-04 PROCEDURE — 160009 HCHG ANES TIME/MIN: Performed by: SURGERY

## 2024-11-04 PROCEDURE — 93005 ELECTROCARDIOGRAM TRACING: CPT | Performed by: SURGERY

## 2024-11-04 PROCEDURE — 700101 HCHG RX REV CODE 250: Performed by: ANESTHESIOLOGY

## 2024-11-04 PROCEDURE — 700111 HCHG RX REV CODE 636 W/ 250 OVERRIDE (IP): Mod: JG | Performed by: SURGERY

## 2024-11-04 PROCEDURE — 160002 HCHG RECOVERY MINUTES (STAT): Performed by: SURGERY

## 2024-11-04 PROCEDURE — 160048 HCHG OR STATISTICAL LEVEL 1-5: Performed by: SURGERY

## 2024-11-04 RX ORDER — HEPARIN SODIUM,PORCINE 1000/ML
VIAL (ML) INJECTION PRN
Status: DISCONTINUED | OUTPATIENT
Start: 2024-11-04 | End: 2024-11-04 | Stop reason: SURG

## 2024-11-04 RX ORDER — ONDANSETRON 2 MG/ML
INJECTION INTRAMUSCULAR; INTRAVENOUS PRN
Status: DISCONTINUED | OUTPATIENT
Start: 2024-11-04 | End: 2024-11-04 | Stop reason: HOSPADM

## 2024-11-04 RX ORDER — INSULIN LISPRO 100 [IU]/ML
2-9 INJECTION, SOLUTION INTRAVENOUS; SUBCUTANEOUS EVERY 6 HOURS
Status: DISCONTINUED | OUTPATIENT
Start: 2024-11-04 | End: 2024-11-04 | Stop reason: HOSPADM

## 2024-11-04 RX ORDER — DEXTROSE MONOHYDRATE 25 G/50ML
25 INJECTION, SOLUTION INTRAVENOUS
Status: DISCONTINUED | OUTPATIENT
Start: 2024-11-04 | End: 2024-11-04 | Stop reason: HOSPADM

## 2024-11-04 RX ORDER — HYDRALAZINE HYDROCHLORIDE 20 MG/ML
5 INJECTION INTRAMUSCULAR; INTRAVENOUS
Status: DISCONTINUED | OUTPATIENT
Start: 2024-11-04 | End: 2024-11-04 | Stop reason: HOSPADM

## 2024-11-04 RX ORDER — BUPIVACAINE HYDROCHLORIDE 2.5 MG/ML
INJECTION, SOLUTION EPIDURAL; INFILTRATION; INTRACAUDAL
Status: DISCONTINUED
Start: 2024-11-04 | End: 2024-11-04 | Stop reason: HOSPADM

## 2024-11-04 RX ORDER — HEPARIN SODIUM 5000 [USP'U]/ML
INJECTION, SOLUTION INTRAVENOUS; SUBCUTANEOUS
Status: DISCONTINUED
Start: 2024-11-04 | End: 2024-11-04 | Stop reason: HOSPADM

## 2024-11-04 RX ORDER — HEPARIN SODIUM 5000 [USP'U]/ML
INJECTION, SOLUTION INTRAVENOUS; SUBCUTANEOUS
Status: DISCONTINUED | OUTPATIENT
Start: 2024-11-04 | End: 2024-11-04 | Stop reason: HOSPADM

## 2024-11-04 RX ORDER — DIPHENHYDRAMINE HYDROCHLORIDE 50 MG/ML
12.5 INJECTION INTRAMUSCULAR; INTRAVENOUS
Status: DISCONTINUED | OUTPATIENT
Start: 2024-11-04 | End: 2024-11-04 | Stop reason: HOSPADM

## 2024-11-04 RX ORDER — CEFAZOLIN SODIUM 1 G/3ML
INJECTION, POWDER, FOR SOLUTION INTRAMUSCULAR; INTRAVENOUS PRN
Status: DISCONTINUED | OUTPATIENT
Start: 2024-11-04 | End: 2024-11-04 | Stop reason: SURG

## 2024-11-04 RX ORDER — OXYCODONE HCL 5 MG/5 ML
5 SOLUTION, ORAL ORAL
Status: DISCONTINUED | OUTPATIENT
Start: 2024-11-04 | End: 2024-11-04 | Stop reason: HOSPADM

## 2024-11-04 RX ORDER — LIDOCAINE HYDROCHLORIDE 20 MG/ML
INJECTION, SOLUTION EPIDURAL; INFILTRATION; INTRACAUDAL; PERINEURAL PRN
Status: DISCONTINUED | OUTPATIENT
Start: 2024-11-04 | End: 2024-11-04 | Stop reason: SURG

## 2024-11-04 RX ORDER — HALOPERIDOL 5 MG/ML
1 INJECTION INTRAMUSCULAR
Status: DISCONTINUED | OUTPATIENT
Start: 2024-11-04 | End: 2024-11-04 | Stop reason: HOSPADM

## 2024-11-04 RX ORDER — OXYCODONE HCL 5 MG/5 ML
10 SOLUTION, ORAL ORAL
Status: DISCONTINUED | OUTPATIENT
Start: 2024-11-04 | End: 2024-11-04 | Stop reason: HOSPADM

## 2024-11-04 RX ORDER — LABETALOL HYDROCHLORIDE 5 MG/ML
5 INJECTION, SOLUTION INTRAVENOUS
Status: DISCONTINUED | OUTPATIENT
Start: 2024-11-04 | End: 2024-11-04 | Stop reason: HOSPADM

## 2024-11-04 RX ORDER — HEPARIN SODIUM 1000 [USP'U]/ML
INJECTION, SOLUTION INTRAVENOUS; SUBCUTANEOUS
Status: COMPLETED
Start: 2024-11-04 | End: 2024-11-04

## 2024-11-04 RX ORDER — PHENYLEPHRINE HCL IN 0.9% NACL 1 MG/10 ML
SYRINGE (ML) INTRAVENOUS PRN
Status: DISCONTINUED | OUTPATIENT
Start: 2024-11-04 | End: 2024-11-04 | Stop reason: SURG

## 2024-11-04 RX ORDER — ONDANSETRON 2 MG/ML
4 INJECTION INTRAMUSCULAR; INTRAVENOUS
Status: DISCONTINUED | OUTPATIENT
Start: 2024-11-04 | End: 2024-11-04 | Stop reason: HOSPADM

## 2024-11-04 RX ORDER — SODIUM CHLORIDE 9 MG/ML
INJECTION, SOLUTION INTRAVENOUS CONTINUOUS
Status: DISCONTINUED | OUTPATIENT
Start: 2024-11-04 | End: 2024-11-04 | Stop reason: HOSPADM

## 2024-11-04 RX ORDER — BUPIVACAINE HYDROCHLORIDE 2.5 MG/ML
INJECTION, SOLUTION EPIDURAL; INFILTRATION; INTRACAUDAL
Status: DISCONTINUED | OUTPATIENT
Start: 2024-11-04 | End: 2024-11-04 | Stop reason: HOSPADM

## 2024-11-04 RX ORDER — METOCLOPRAMIDE HYDROCHLORIDE 5 MG/ML
INJECTION INTRAMUSCULAR; INTRAVENOUS PRN
Status: DISCONTINUED | OUTPATIENT
Start: 2024-11-04 | End: 2024-11-04 | Stop reason: SURG

## 2024-11-04 RX ORDER — ALBUTEROL SULFATE 5 MG/ML
2.5 SOLUTION RESPIRATORY (INHALATION)
Status: DISCONTINUED | OUTPATIENT
Start: 2024-11-04 | End: 2024-11-04 | Stop reason: HOSPADM

## 2024-11-04 RX ORDER — SODIUM CHLORIDE 9 MG/ML
INJECTION, SOLUTION INTRAVENOUS
Status: DISCONTINUED | OUTPATIENT
Start: 2024-11-04 | End: 2024-11-04 | Stop reason: SURG

## 2024-11-04 RX ORDER — PROTAMINE SULFATE 10 MG/ML
INJECTION, SOLUTION INTRAVENOUS
Status: DISCONTINUED
Start: 2024-11-04 | End: 2024-11-04 | Stop reason: HOSPADM

## 2024-11-04 RX ADMIN — Medication 100 MCG: at 09:22

## 2024-11-04 RX ADMIN — HEPARIN SODIUM 3000 UNITS: 1000 INJECTION, SOLUTION INTRAVENOUS; SUBCUTANEOUS at 09:28

## 2024-11-04 RX ADMIN — ONDANSETRON 4 MG: 2 INJECTION INTRAMUSCULAR; INTRAVENOUS at 09:49

## 2024-11-04 RX ADMIN — METOCLOPRAMIDE HYDROCHLORIDE 10 MG: 5 INJECTION INTRAMUSCULAR; INTRAVENOUS at 09:19

## 2024-11-04 RX ADMIN — LIDOCAINE HYDROCHLORIDE 100 MG: 20 INJECTION, SOLUTION EPIDURAL; INFILTRATION; INTRACAUDAL at 09:14

## 2024-11-04 RX ADMIN — SODIUM CHLORIDE: 9 INJECTION, SOLUTION INTRAVENOUS at 09:10

## 2024-11-04 RX ADMIN — PROPOFOL 200 MG: 10 INJECTION, EMULSION INTRAVENOUS at 09:14

## 2024-11-04 RX ADMIN — Medication 100 MCG: at 09:31

## 2024-11-04 RX ADMIN — FENTANYL CITRATE 50 MCG: 50 INJECTION, SOLUTION INTRAMUSCULAR; INTRAVENOUS at 09:10

## 2024-11-04 RX ADMIN — Medication 100 MCG: at 09:40

## 2024-11-04 RX ADMIN — CEFAZOLIN 2 G: 1 INJECTION, POWDER, FOR SOLUTION INTRAMUSCULAR; INTRAVENOUS at 09:14

## 2024-11-04 ASSESSMENT — FIBROSIS 4 INDEX: FIB4 SCORE: 0.75

## 2024-11-04 NOTE — OP REPORT
DATE OF SERVICE:  11/04/2024     SURGEON:  Joyce Ureña MD     ANESTHESIOLOGIST:  Cinthia Mike MD     TYPE OF ANESTHESIA:  General anesthesia and local anesthesia with 20 mL of   0.25% Marcaine solution.     PREOPERATIVE DIAGNOSIS:  End-stage renal disease.     POSTOPERATIVE DIAGNOSIS:  End-stage renal disease.     PROCEDURE:  Stage I left brachiobasilic fistula creation.     INDICATIONS FOR PROCEDURE:  This is a pleasant 54-year-old male with multiple   medical problems including end-stage renal disease, on hemodialysis, who was   referred to see me for fistula creation.  Discussion was made with the   patient.  He would like to undergo left brachiobasilic fistula creation in 2   stages, fully understanding all risks.     DESCRIPTION OF PROCEDURE:  Informed consent was obtained.  The patient was   taken to the operating room and was placed in the supine position.  Sequential   compression device was applied.  The patient was given Ancef intravenously.    General anesthesia was induced.     Next, his left upper extremity was sterilely prepped and draped in the normal   fashion.  A timeout procedure was done.  The skin and subcutaneous tissues   over and surrounding the basilic vein and brachial artery in the distal upper   arm and proximal forearm were anesthetized with 20 mL of Marcaine solution.  An   incision was made between the two structures.  The incision was extended   through subcutaneous tissue using the electrocautery.  The basilic vein was   identified, carefully dissected free from the surrounding tissue.  The   brachial artery was also identified, carefully dissected free from the   surrounding tissue.     The patient was given heparin 3000 units intravenously.  After the heparin was   allowed to circulate systemically for 3 minutes, the basilic vein was clipped   distally with Hemoclips and divided above the clips.  Vascular control of the   brachial artery was obtained with vascular  clamps.  An arteriotomy was made   on the brachial artery.  The basilic vein was opened posteriorly and   anastomosed end-to-side to the brachial artery using running 7-0 Prolene   sutures.  Prior to completing the anastomosis, backbleeding and flushing were   obtained.  The anastomosis was completed.  Flow was first restored into the   fistula and then into the distal brachial artery.  A sterile Doppler probe was   brought into the operative field.  Excellent Doppler flow signals were obtained   over the fistula as well as over the brachial artery above and below the   anastomosis with significant diastolic flow component.     The wound was irrigated and was found to have excellent hemostasis.  The   wound was closed subcutaneously with running 3-0 Vicryl suture and   subcuticularly with 4-0 Monocryl suture.  The wound was cleaned and sterile   dressing was applied.     ESTIMATED BLOOD LOSS:  2 mL.     COUNTS:  Sponge and instrument count was correct x2.     The patient was then awakened, extubated, taken to recovery area in stable   condition with good bruits over the fistula and intact neurovascular   examination of the left hand.        ______________________________  MD SHERITA Mcdonald/AZM    DD:  11/04/2024 10:03  DT:  11/04/2024 10:38    Job#:  [id number]

## 2024-11-04 NOTE — ANESTHESIA POSTPROCEDURE EVALUATION
Patient: Beni Moore    Procedure Summary       Date: 11/04/24 Room / Location: [facility] ROOM 23 / SURGERY SAME DAY [facility]    Anesthesia Start:  Anesthesia Stop:     Procedure: CREATION OF LEFT UPPER EXTREMITY  DIALYSIS FISTULA Diagnosis: (ESRD)    Surgeons: Joyce Ureña M.D. Responsible Provider:     Anesthesia Type: general ASA Status: 3            Final Anesthesia Type: general  Last vitals  BP   Blood Pressure: 129/69    Temp   36.7 °C (98 °F)    Pulse   70   Resp   14    SpO2   100 %      Anesthesia Post Evaluation    Patient location during evaluation: PACU  Patient participation: complete - patient participated  Level of consciousness: awake and alert    Airway patency: patent  Anesthetic complications: no  Cardiovascular status: hemodynamically stable  Respiratory status: acceptable  Hydration status: euvolemic    PONV: none          There were no known notable events for this encounter.     Nurse Pain Score: 0 (NPRS)

## 2024-11-04 NOTE — OR NURSING
Introduced self to patient and family. Discussed plan of care. Surgical consent signed. IV inserted with US by supervisor, labs drawn and sent. Called EKG. Checked oxygen tank on rErick, 559 psi. Pre op complete.

## 2024-11-04 NOTE — ANESTHESIA PREPROCEDURE EVALUATION
Case: [id number] Date/Time: 11/04/24 0815    Procedure: CREATION OF LEFT UPPER EXTREMITY  DIALYSIS FISTULA    Pre-op diagnosis: ESRD    Location: [facility] ROOM 23 / SURGERY SAME DAY [facility]    Surgeons: Joyce Ureña M.D.          55 yo M with history of HTN, CAD s/p STEMI, stent, SOBIA, DM2 and ESRD on HD 1x/week, last on Wednesday.  Reports one instance of post-op hypotension, but it hasn't been a problem recently.  Achieves >4 METS.  No current CP/SOB/N/V symptoms.     NPO  K+ 3.4  Relevant Problems   CARDIAC   (positive) Coronary artery disease involving native coronary artery of native heart without angina pectoris   (positive) Hypertension   (positive) NSTEMI (non-ST elevated myocardial infarction) ([facility])   (positive) Presence of stent in coronary artery         (positive) Acute renal failure superimposed on stage 3b chronic kidney disease (HCC)   (positive) CKD (chronic kidney disease) stage 5, GFR less than 15 ml/min (HCC)   (positive) Hypertensive heart and chronic kidney disease with heart failure and stage 1 through stage 4 chronic kidney disease, or unspecified chronic kidney disease (HCC)   (positive) Type 2 diabetes mellitus with kidney complication, with long-term current use of insulin (HCC)      ENDO   (positive) Type 2 diabetes mellitus with kidney complication, with long-term current use of insulin (HCC)      Other   (positive) Osteomyelitis and soft tissue infection of left foot (HCC)       Physical Exam    Airway   Mallampati: III  TM distance: >3 FB  Neck ROM: full       Cardiovascular - normal exam  Rhythm: regular  Rate: normal  (-) murmur     Dental - normal exam        Facial Hair   Pulmonary - normal exam  Breath sounds clear to auscultation     Abdominal    Neurological - normal exam                   Anesthesia Plan    ASA 3   ASA physical status 3 criteria: ESRD undergoing regularly scheduled dialysis and CAD/stents (> 3 months)    Plan - general       Airway plan will be  LMA          Induction: intravenous    Postoperative Plan: Postoperative administration of opioids is intended.    Pertinent diagnostic labs and testing reviewed    Informed Consent:    Anesthetic plan and risks discussed with patient.    Use of blood products discussed with: patient whom consented to blood products.

## 2024-11-04 NOTE — ANESTHESIA PROCEDURE NOTES
Airway    Date/Time: 11/4/2024 9:15 AM    Performed by: Cinthia Mike M.D.  Authorized by: Cinthia Mike M.D.    Location:  OR  Urgency:  Elective  Difficult Airway: No    Indications for Airway Management:  Anesthesia      Spontaneous Ventilation: absent    Sedation Level:  Deep  Preoxygenated: Yes    Mask Difficulty Assessment:  0 - not attempted  Final Airway Type:  Supraglottic airway  Final Supraglottic Airway:  Standard LMA    SGA Size:  4  Number of Attempts at Approach:  1

## 2024-11-04 NOTE — DISCHARGE INSTRUCTIONS
POST-OP instructions from Dr Ureña:    1) Activities: Keep left arm straight and elevated as much as possible.   No blood procedures/IVs/blood draws/sleeping on left arm.   May remove dressing after 3 days.   2) Medications: Resume home medications.  May take Tylenol as needed for discomfort.   3) Follow-up with Dr. Ureña in vascular clinic in 2 to 3 weeks.  Call [phone] for appointment and for any problem, especially numbness or discoloration of the left hand/fingers.     What to Expect Post Anesthesia    Rest and take it easy for the first 24 hours.  A responsible adult is recommended to remain with you during that time.  It is normal to feel sleepy.  We encourage you to not do anything that requires balance, judgment or coordination.    FOR 24 HOURS DO NOT:  Drive, operate machinery or run household appliances.  Drink beer or alcoholic beverages.  Make important decisions or sign legal documents.    To avoid nausea, slowly advance diet as tolerated, avoiding spicy or greasy foods for the first day.  Add more substantial food to your diet according to your provider's instructions.  Babies can be fed formula or breast milk as soon as they are hungry.  INCREASE FLUIDS AND FIBER TO AVOID CONSTIPATION.    MILD FLU-LIKE SYMPTOMS ARE NORMAL.  YOU MAY EXPERIENCE GENERALIZED MUSCLE ACHES, THROAT IRRITATION, HEADACHE AND/OR SOME NAUSEA.    If any questions arise, call your provider.  If your provider is not available, please feel free to call the Surgical Center at [phone].    MEDICATIONS: Resume taking daily medication.  Take prescribed pain medication with food.  If no medication is prescribed, you may take non-aspirin pain medication if needed.  PAIN MEDICATION CAN BE VERY CONSTIPATING.  Take a stool softener or laxative such as senokot, pericolace, or milk of magnesia if needed.    Last pain medication given: none

## 2024-11-04 NOTE — OR SURGEON
Immediate Post OP Note    PreOp Diagnosis: End-stage renal disease.      PostOp Diagnosis: Same.      Procedure(s):  Stage I left brachiobasilic fistula creation - Wound Class: Clean    Surgeon(s):  Joyce Ureña M.D.    Anesthesiologist/Type of Anesthesia:  Anesthesiologist: Cinthia Mike M.D./General    Surgical Staff:  Circulator: Mone Roblero R.N.  Scrub Person: Mora Mcmillan    Specimens removed if any:  * No specimens in log *    Estimated Blood Loss: 2 mL.    Findings: Good flow post creation.    Complications: None.    Dictated, #[id number].        11/4/2024 9:59 AM Joyce Ureña M.D.

## 2024-11-04 NOTE — OR NURSING
1000-Pt arrived to PACU from OR via gurney. Report received from OR RN and anesthesiologist. Monitor applied. Pt drowsy but arousable, denies pain and nausea. Left arm dressing CDI.    1030 Pt more awake, FSBS 94, pt tolerating juice    1036 Mother to bedside    1040 DC instructions reviewed with pt and mother, verbal understanding expressed, all questions answered    1050 Pt dressed with assistance, VSS, PIV DC'd    1104 Pt Dc'd home with parents via  escort out

## 2024-11-04 NOTE — ANESTHESIA TIME REPORT
Anesthesia Start and Stop Event Times       Date Time Event    11/4/2024 0855 Ready for Procedure     0910 Anesthesia Start     1002 Anesthesia Stop          Responsible Staff  11/04/24      Name Role Begin End    Cinthia Mike M.D. Anesth 0910 1002          Overtime Reason:  no overtime (within assigned shift)    Comments:

## 2024-11-08 NOTE — OR NURSING
Assumed patient care at 1330.  AO x 4, parents at bedside. Vital signs stable. Pain level 0/10, no complaints of n/v.  Dressing to LUE/shoulder c/d/I, sling in place, fingers warm, generalized swelling.      1435 Meds picked up and outpatient pharmacy and delivered to patient by this rn. Patient states ready to go home.  VSS, patient has all belongings. IV removed.  Patient got dressed with assistance.  Discharge instructions discussed with patient and family.  Questions answered. Patient and family verbalized understanding.  Sling in place, dressing remains c/d/I, L hand pink and warm. Patient taken out with wheelchair and all belongings.   
Patient recovered well in post-op. AAOx4. VSS, on RA. Surgical sites JENNIFER, surgical dressing in place CDI. Declines pain/nausea. Mom updated and discussed POC. No belongings in pacu. Report called to JULIANO Clark. Patient transported to phase II.   
Statement Selected

## 2024-11-12 ENCOUNTER — APPOINTMENT (OUTPATIENT)
Dept: UROLOGY | Facility: MEDICAL CENTER | Age: 54
End: 2024-11-12
Payer: MEDICARE

## 2024-11-26 ENCOUNTER — OFFICE VISIT (OUTPATIENT)
Dept: VASCULAR SURGERY | Facility: MEDICAL CENTER | Age: 54
End: 2024-11-26
Payer: MEDICARE

## 2024-11-26 VITALS
WEIGHT: 195 LBS | SYSTOLIC BLOOD PRESSURE: 130 MMHG | HEART RATE: 84 BPM | BODY MASS INDEX: 26.41 KG/M2 | DIASTOLIC BLOOD PRESSURE: 76 MMHG | HEIGHT: 72 IN | TEMPERATURE: 98.3 F | OXYGEN SATURATION: 97 %

## 2024-11-26 DIAGNOSIS — N18.6 END STAGE RENAL DISEASE ON DIALYSIS (HCC): ICD-10-CM

## 2024-11-26 DIAGNOSIS — Z99.2 END STAGE RENAL DISEASE ON DIALYSIS (HCC): ICD-10-CM

## 2024-11-26 PROCEDURE — 3075F SYST BP GE 130 - 139MM HG: CPT | Performed by: PHYSICIAN ASSISTANT

## 2024-11-26 PROCEDURE — 99024 POSTOP FOLLOW-UP VISIT: CPT | Performed by: PHYSICIAN ASSISTANT

## 2024-11-26 PROCEDURE — 3078F DIAST BP <80 MM HG: CPT | Performed by: PHYSICIAN ASSISTANT

## 2024-11-26 ASSESSMENT — FIBROSIS 4 INDEX: FIB4 SCORE: 1.154509663010003167

## 2024-11-26 NOTE — PROGRESS NOTES
VASCULAR SURGERY                 Clinic Progress Note  _________________________________    Vitals for Today's Visit (11/26/2024)  /76 (BP Location: Left arm, Patient Position: Sitting, BP Cuff Size: Adult)   Pulse 84   Temp 36.8 °C (98.3 °F) (Temporal)   Ht 1.829 m (6')   Wt 88.5 kg (195 lb)   SpO2 97%   BMI 26.45 kg/m²   _________________________________      11/26/2024  Mr. Moore presents for a post operative evaluation. He recently underwent Stage I brachiobasilic AVF creation on 10/22/2024.    He has been on hemodialysis since January 2024.  Patient is only on hemodialysis 1 day a week on Wednesday via right IJ permacath.  He nephrologist is Dr. Ortiz.     He is doing well following his AVF creation without significant hand pain, numbness, or tinging. He notes the incision is healing well.       Pleasant male, no acute distress  Left arm incision well healed. Thrill noted   Hand warm with palpable radial pulse   Left BKA and prosthetic present.       A/P:  ESRD   S/p stage I brachiobasilic AVF creation     Mr. Moore is doing well following his creation. We will get him scheduled for stage II brachiobasilic transposition in the coming weeks. Risks and benefits were discussed with him including but not limited to, bleeding, infection, seroma formation, seroma leak, arterial steal syndrome which if severe enough would require ligation of the fistula, and perioperative anesthetic complications.  Patient indicated understanding and would like to proceed.  All questions were answered.  Ample time was spent addressing his questions today.         Flor Gilbert P.A.-C.  [facility]  [phone]  [address], [place] [address]

## 2024-12-04 ENCOUNTER — APPOINTMENT (OUTPATIENT)
Dept: ADMISSIONS | Facility: MEDICAL CENTER | Age: 54
End: 2024-12-04
Attending: SURGERY
Payer: MEDICARE

## 2024-12-09 ENCOUNTER — PRE-ADMISSION TESTING (OUTPATIENT)
Dept: ADMISSIONS | Facility: MEDICAL CENTER | Age: 54
End: 2024-12-09
Attending: SURGERY
Payer: MEDICARE

## 2024-12-09 NOTE — OR NURSING
Preadmit note for surgery/procedure scheduled on 1/6/2025 with Dr. Ureña:    Patient given fasting and medication instructions per anesthesia guidelines.  Patient understands to follow prescribing doctor/surgeon's instructions if they differ.  Patient understands all instructions and denies questions at this time.

## 2024-12-09 NOTE — PREADMIT AVS NOTE
Current Medications   Medication Instructions    calcium acetate (PHOS-LO) 667 MG Cap Patient states he is not taking this    losartan (COZAAR) 100 MG Tab Stop 24 hours before surgery    Calcium Carbonate Antacid 1000 MG Chew Tab Hold medication day of procedure    Insulin Degludec (TRESIBA) 100 UNIT/ML Solution Follow instructions from Surgeon or Specialist     aspirin 81 MG EC tablet Follow instructions from Surgeon or Specialist     atorvastatin (LIPITOR) 80 MG tablet Continue taking medication prior to surgery    torsemide (DEMADEX) 100 MG Tab Hold medication day of procedure    HUMULIN R 100 UNIT/ML Solution Follow instructions from Surgeon or Specialist     cholestyramine (QUESTRAN) 4 g packet Stop 24 hours before surgery    omeprazole (PRILOSEC) 20 MG delayed-release capsule Continue taking medication prior to surgery

## 2025-01-06 ENCOUNTER — HOSPITAL ENCOUNTER (OUTPATIENT)
Facility: MEDICAL CENTER | Age: 55
End: 2025-01-06
Attending: SURGERY | Admitting: SURGERY
Payer: MEDICARE

## 2025-01-06 ENCOUNTER — ANESTHESIA EVENT (OUTPATIENT)
Dept: SURGERY | Facility: MEDICAL CENTER | Age: 55
End: 2025-01-06
Payer: MEDICARE

## 2025-01-06 ENCOUNTER — PHARMACY VISIT (OUTPATIENT)
Dept: PHARMACY | Facility: MEDICAL CENTER | Age: 55
End: 2025-01-06
Payer: COMMERCIAL

## 2025-01-06 ENCOUNTER — ANESTHESIA (OUTPATIENT)
Dept: SURGERY | Facility: MEDICAL CENTER | Age: 55
End: 2025-01-06
Payer: MEDICARE

## 2025-01-06 VITALS
RESPIRATION RATE: 16 BRPM | SYSTOLIC BLOOD PRESSURE: 162 MMHG | WEIGHT: 192.24 LBS | BODY MASS INDEX: 25.48 KG/M2 | OXYGEN SATURATION: 94 % | HEIGHT: 73 IN | DIASTOLIC BLOOD PRESSURE: 79 MMHG | HEART RATE: 74 BPM | TEMPERATURE: 97 F

## 2025-01-06 DIAGNOSIS — I77.0 ARTERIOVENOUS FISTULA, ACQUIRED (HCC): ICD-10-CM

## 2025-01-06 LAB
ALBUMIN SERPL BCP-MCNC: 3.5 G/DL (ref 3.2–4.9)
ALBUMIN/GLOB SERPL: 1.3 G/DL
ALP SERPL-CCNC: 66 U/L (ref 30–99)
ALT SERPL-CCNC: 7 U/L (ref 2–50)
ANION GAP SERPL CALC-SCNC: 21 MMOL/L (ref 7–16)
AST SERPL-CCNC: 10 U/L (ref 12–45)
BILIRUB SERPL-MCNC: 0.8 MG/DL (ref 0.1–1.5)
BUN SERPL-MCNC: 90 MG/DL (ref 8–22)
CALCIUM ALBUM COR SERPL-MCNC: 6.9 MG/DL (ref 8.5–10.5)
CALCIUM SERPL-MCNC: 6.5 MG/DL (ref 8.5–10.5)
CHLORIDE SERPL-SCNC: 95 MMOL/L (ref 96–112)
CO2 SERPL-SCNC: 20 MMOL/L (ref 20–33)
CREAT SERPL-MCNC: 9.87 MG/DL (ref 0.5–1.4)
ERYTHROCYTE [DISTWIDTH] IN BLOOD BY AUTOMATED COUNT: 40.2 FL (ref 35.9–50)
GFR SERPLBLD CREATININE-BSD FMLA CKD-EPI: 6 ML/MIN/1.73 M 2
GLOBULIN SER CALC-MCNC: 2.6 G/DL (ref 1.9–3.5)
GLUCOSE BLD STRIP.AUTO-MCNC: 153 MG/DL (ref 65–99)
GLUCOSE SERPL-MCNC: 159 MG/DL (ref 65–99)
HCT VFR BLD AUTO: 27.4 % (ref 42–52)
HGB BLD-MCNC: 9.4 G/DL (ref 14–18)
MCH RBC QN AUTO: 30.5 PG (ref 27–33)
MCHC RBC AUTO-ENTMCNC: 34.3 G/DL (ref 32.3–36.5)
MCV RBC AUTO: 89 FL (ref 81.4–97.8)
PLATELET # BLD AUTO: 136 K/UL (ref 164–446)
PMV BLD AUTO: 10.3 FL (ref 9–12.9)
POTASSIUM SERPL-SCNC: 3.5 MMOL/L (ref 3.6–5.5)
PROT SERPL-MCNC: 6.1 G/DL (ref 6–8.2)
RBC # BLD AUTO: 3.08 M/UL (ref 4.7–6.1)
SODIUM SERPL-SCNC: 136 MMOL/L (ref 135–145)
WBC # BLD AUTO: 7.2 K/UL (ref 4.8–10.8)

## 2025-01-06 PROCEDURE — 160025 RECOVERY II MINUTES (STATS): Performed by: SURGERY

## 2025-01-06 PROCEDURE — 700101 HCHG RX REV CODE 250: Performed by: ANESTHESIOLOGY

## 2025-01-06 PROCEDURE — 700111 HCHG RX REV CODE 636 W/ 250 OVERRIDE (IP): Performed by: SURGERY

## 2025-01-06 PROCEDURE — 160035 HCHG PACU - 1ST 60 MINS PHASE I: Performed by: SURGERY

## 2025-01-06 PROCEDURE — 700102 HCHG RX REV CODE 250 W/ 637 OVERRIDE(OP): Performed by: ANESTHESIOLOGY

## 2025-01-06 PROCEDURE — 160047 HCHG PACU  - EA ADDL 30 MINS PHASE II: Performed by: SURGERY

## 2025-01-06 PROCEDURE — 160039 HCHG SURGERY MINUTES - EA ADDL 1 MIN LEVEL 3: Performed by: SURGERY

## 2025-01-06 PROCEDURE — A9270 NON-COVERED ITEM OR SERVICE: HCPCS | Performed by: ANESTHESIOLOGY

## 2025-01-06 PROCEDURE — 160048 HCHG OR STATISTICAL LEVEL 1-5: Performed by: SURGERY

## 2025-01-06 PROCEDURE — RXMED WILLOW AMBULATORY MEDICATION CHARGE: Performed by: SURGERY

## 2025-01-06 PROCEDURE — 700111 HCHG RX REV CODE 636 W/ 250 OVERRIDE (IP): Performed by: ANESTHESIOLOGY

## 2025-01-06 PROCEDURE — 160009 HCHG ANES TIME/MIN: Performed by: SURGERY

## 2025-01-06 PROCEDURE — 160028 HCHG SURGERY MINUTES - 1ST 30 MINS LEVEL 3: Performed by: SURGERY

## 2025-01-06 PROCEDURE — 700105 HCHG RX REV CODE 258: Performed by: ANESTHESIOLOGY

## 2025-01-06 PROCEDURE — 36819 AV FUSE UPPR ARM BASILIC: CPT | Mod: LT | Performed by: SURGERY

## 2025-01-06 PROCEDURE — 82962 GLUCOSE BLOOD TEST: CPT

## 2025-01-06 PROCEDURE — 36415 COLL VENOUS BLD VENIPUNCTURE: CPT

## 2025-01-06 PROCEDURE — 160002 HCHG RECOVERY MINUTES (STAT): Performed by: SURGERY

## 2025-01-06 PROCEDURE — 80053 COMPREHEN METABOLIC PANEL: CPT

## 2025-01-06 PROCEDURE — 160046 HCHG PACU - 1ST 60 MINS PHASE II: Performed by: SURGERY

## 2025-01-06 PROCEDURE — C1713 ANCHOR/SCREW BN/BN,TIS/BN: HCPCS | Performed by: SURGERY

## 2025-01-06 PROCEDURE — C1729 CATH, DRAINAGE: HCPCS | Performed by: SURGERY

## 2025-01-06 PROCEDURE — 85027 COMPLETE CBC AUTOMATED: CPT

## 2025-01-06 RX ORDER — HYDROMORPHONE HYDROCHLORIDE 1 MG/ML
0.4 INJECTION, SOLUTION INTRAMUSCULAR; INTRAVENOUS; SUBCUTANEOUS
Status: DISCONTINUED | OUTPATIENT
Start: 2025-01-06 | End: 2025-01-06 | Stop reason: HOSPADM

## 2025-01-06 RX ORDER — SODIUM CHLORIDE, SODIUM LACTATE, POTASSIUM CHLORIDE, CALCIUM CHLORIDE 600; 310; 30; 20 MG/100ML; MG/100ML; MG/100ML; MG/100ML
INJECTION, SOLUTION INTRAVENOUS CONTINUOUS
Status: DISCONTINUED | OUTPATIENT
Start: 2025-01-06 | End: 2025-01-06 | Stop reason: HOSPADM

## 2025-01-06 RX ORDER — HYDRALAZINE HYDROCHLORIDE 20 MG/ML
10 INJECTION INTRAMUSCULAR; INTRAVENOUS EVERY 4 HOURS PRN
Status: DISCONTINUED | OUTPATIENT
Start: 2025-01-06 | End: 2025-01-06 | Stop reason: HOSPADM

## 2025-01-06 RX ORDER — HEPARIN SODIUM 5000 [USP'U]/ML
INJECTION, SOLUTION INTRAVENOUS; SUBCUTANEOUS
Status: DISCONTINUED
Start: 2025-01-06 | End: 2025-01-06 | Stop reason: HOSPADM

## 2025-01-06 RX ORDER — LABETALOL HYDROCHLORIDE 5 MG/ML
10 INJECTION, SOLUTION INTRAVENOUS EVERY 4 HOURS PRN
Status: DISCONTINUED | OUTPATIENT
Start: 2025-01-06 | End: 2025-01-06 | Stop reason: HOSPADM

## 2025-01-06 RX ORDER — LIDOCAINE HYDROCHLORIDE 10 MG/ML
INJECTION, SOLUTION EPIDURAL; INFILTRATION; INTRACAUDAL; PERINEURAL
Status: DISCONTINUED
Start: 2025-01-06 | End: 2025-01-06 | Stop reason: HOSPADM

## 2025-01-06 RX ORDER — DEXAMETHASONE SODIUM PHOSPHATE 4 MG/ML
INJECTION, SOLUTION INTRA-ARTICULAR; INTRALESIONAL; INTRAMUSCULAR; INTRAVENOUS; SOFT TISSUE PRN
Status: DISCONTINUED | OUTPATIENT
Start: 2025-01-06 | End: 2025-01-06 | Stop reason: SURG

## 2025-01-06 RX ORDER — BUPIVACAINE HYDROCHLORIDE 2.5 MG/ML
INJECTION, SOLUTION EPIDURAL; INFILTRATION; INTRACAUDAL
Status: DISCONTINUED
Start: 2025-01-06 | End: 2025-01-06 | Stop reason: HOSPADM

## 2025-01-06 RX ORDER — HYDROMORPHONE HYDROCHLORIDE 1 MG/ML
0.1 INJECTION, SOLUTION INTRAMUSCULAR; INTRAVENOUS; SUBCUTANEOUS
Status: DISCONTINUED | OUTPATIENT
Start: 2025-01-06 | End: 2025-01-06 | Stop reason: HOSPADM

## 2025-01-06 RX ORDER — SODIUM CHLORIDE 9 MG/ML
INJECTION, SOLUTION INTRAVENOUS
Status: DISCONTINUED | OUTPATIENT
Start: 2025-01-06 | End: 2025-01-06 | Stop reason: SURG

## 2025-01-06 RX ORDER — HYDROCODONE BITARTRATE AND ACETAMINOPHEN 5; 325 MG/1; MG/1
1 TABLET ORAL EVERY 6 HOURS PRN
Qty: 16 TABLET | Refills: 0 | Status: SHIPPED | OUTPATIENT
Start: 2025-01-06 | End: 2025-01-10

## 2025-01-06 RX ORDER — OXYCODONE HCL 5 MG/5 ML
10 SOLUTION, ORAL ORAL
Status: COMPLETED | OUTPATIENT
Start: 2025-01-06 | End: 2025-01-06

## 2025-01-06 RX ORDER — OXYCODONE HCL 5 MG/5 ML
5 SOLUTION, ORAL ORAL
Status: COMPLETED | OUTPATIENT
Start: 2025-01-06 | End: 2025-01-06

## 2025-01-06 RX ORDER — BUPIVACAINE HYDROCHLORIDE 2.5 MG/ML
INJECTION, SOLUTION EPIDURAL; INFILTRATION; INTRACAUDAL
Status: DISCONTINUED | OUTPATIENT
Start: 2025-01-06 | End: 2025-01-06 | Stop reason: HOSPADM

## 2025-01-06 RX ORDER — PROTAMINE SULFATE 10 MG/ML
INJECTION, SOLUTION INTRAVENOUS
Status: DISCONTINUED
Start: 2025-01-06 | End: 2025-01-06 | Stop reason: HOSPADM

## 2025-01-06 RX ORDER — HYDROMORPHONE HYDROCHLORIDE 1 MG/ML
0.2 INJECTION, SOLUTION INTRAMUSCULAR; INTRAVENOUS; SUBCUTANEOUS
Status: DISCONTINUED | OUTPATIENT
Start: 2025-01-06 | End: 2025-01-06 | Stop reason: HOSPADM

## 2025-01-06 RX ORDER — PROTAMINE SULFATE 10 MG/ML
INJECTION, SOLUTION INTRAVENOUS
Status: DISCONTINUED | OUTPATIENT
Start: 2025-01-06 | End: 2025-01-06 | Stop reason: HOSPADM

## 2025-01-06 RX ORDER — LIDOCAINE HYDROCHLORIDE 20 MG/ML
INJECTION, SOLUTION EPIDURAL; INFILTRATION; INTRACAUDAL; PERINEURAL PRN
Status: DISCONTINUED | OUTPATIENT
Start: 2025-01-06 | End: 2025-01-06 | Stop reason: SURG

## 2025-01-06 RX ORDER — SODIUM CHLORIDE 9 MG/ML
INJECTION, SOLUTION INTRAVENOUS ONCE
Status: DISCONTINUED | OUTPATIENT
Start: 2025-01-06 | End: 2025-01-06 | Stop reason: HOSPADM

## 2025-01-06 RX ORDER — HALOPERIDOL 5 MG/ML
1 INJECTION INTRAMUSCULAR
Status: DISCONTINUED | OUTPATIENT
Start: 2025-01-06 | End: 2025-01-06 | Stop reason: HOSPADM

## 2025-01-06 RX ORDER — DIPHENHYDRAMINE HYDROCHLORIDE 50 MG/ML
12.5 INJECTION INTRAMUSCULAR; INTRAVENOUS
Status: DISCONTINUED | OUTPATIENT
Start: 2025-01-06 | End: 2025-01-06 | Stop reason: HOSPADM

## 2025-01-06 RX ORDER — CEFAZOLIN SODIUM 1 G/3ML
INJECTION, POWDER, FOR SOLUTION INTRAMUSCULAR; INTRAVENOUS PRN
Status: DISCONTINUED | OUTPATIENT
Start: 2025-01-06 | End: 2025-01-06 | Stop reason: SURG

## 2025-01-06 RX ORDER — EPHEDRINE SULFATE 50 MG/ML
INJECTION, SOLUTION INTRAVENOUS PRN
Status: DISCONTINUED | OUTPATIENT
Start: 2025-01-06 | End: 2025-01-06 | Stop reason: SURG

## 2025-01-06 RX ORDER — ONDANSETRON 2 MG/ML
4 INJECTION INTRAMUSCULAR; INTRAVENOUS
Status: DISCONTINUED | OUTPATIENT
Start: 2025-01-06 | End: 2025-01-06 | Stop reason: HOSPADM

## 2025-01-06 RX ORDER — HEPARIN SODIUM 1000 [USP'U]/ML
INJECTION, SOLUTION INTRAVENOUS; SUBCUTANEOUS
Status: DISCONTINUED
Start: 2025-01-06 | End: 2025-01-06 | Stop reason: HOSPADM

## 2025-01-06 RX ORDER — HEPARIN SODIUM 1000 [USP'U]/ML
INJECTION, SOLUTION INTRAVENOUS; SUBCUTANEOUS
Status: DISCONTINUED | OUTPATIENT
Start: 2025-01-06 | End: 2025-01-06 | Stop reason: HOSPADM

## 2025-01-06 RX ORDER — HEPARIN SODIUM,PORCINE 1000/ML
VIAL (ML) INJECTION PRN
Status: DISCONTINUED | OUTPATIENT
Start: 2025-01-06 | End: 2025-01-06 | Stop reason: SURG

## 2025-01-06 RX ORDER — HEPARIN SODIUM 1000 [USP'U]/ML
INJECTION, SOLUTION INTRAVENOUS; SUBCUTANEOUS
Status: COMPLETED
Start: 2025-01-06 | End: 2025-01-06

## 2025-01-06 RX ORDER — HEPARIN SODIUM 5000 [USP'U]/ML
INJECTION, SOLUTION INTRAVENOUS; SUBCUTANEOUS
Status: DISCONTINUED | OUTPATIENT
Start: 2025-01-06 | End: 2025-01-06 | Stop reason: HOSPADM

## 2025-01-06 RX ADMIN — CEFAZOLIN 2 G: 1 INJECTION, POWDER, FOR SOLUTION INTRAMUSCULAR; INTRAVENOUS at 07:56

## 2025-01-06 RX ADMIN — SODIUM CHLORIDE: 9 INJECTION, SOLUTION INTRAVENOUS at 07:55

## 2025-01-06 RX ADMIN — HEPARIN SODIUM 3000 UNITS: 1000 INJECTION, SOLUTION INTRAVENOUS; SUBCUTANEOUS at 08:31

## 2025-01-06 RX ADMIN — DEXAMETHASONE SODIUM PHOSPHATE 8 MG: 4 INJECTION INTRA-ARTICULAR; INTRALESIONAL; INTRAMUSCULAR; INTRAVENOUS; SOFT TISSUE at 07:56

## 2025-01-06 RX ADMIN — FENTANYL CITRATE 25 MCG: 50 INJECTION, SOLUTION INTRAMUSCULAR; INTRAVENOUS at 08:07

## 2025-01-06 RX ADMIN — LIDOCAINE HYDROCHLORIDE 100 MG: 20 INJECTION, SOLUTION EPIDURAL; INFILTRATION; INTRACAUDAL; PERINEURAL at 07:56

## 2025-01-06 RX ADMIN — OXYCODONE HYDROCHLORIDE 5 MG: 5 SOLUTION ORAL at 10:16

## 2025-01-06 RX ADMIN — EPHEDRINE SULFATE 10 MG: 50 INJECTION, SOLUTION INTRAVENOUS at 08:41

## 2025-01-06 RX ADMIN — FENTANYL CITRATE 25 MCG: 50 INJECTION, SOLUTION INTRAMUSCULAR; INTRAVENOUS at 08:43

## 2025-01-06 RX ADMIN — LABETALOL HYDROCHLORIDE 10 MG: 5 INJECTION, SOLUTION INTRAVENOUS at 10:12

## 2025-01-06 RX ADMIN — PROPOFOL 200 MG: 10 INJECTION, EMULSION INTRAVENOUS at 07:56

## 2025-01-06 ASSESSMENT — FIBROSIS 4 INDEX: FIB4 SCORE: 1.154509663010003167

## 2025-01-06 ASSESSMENT — PAIN DESCRIPTION - PAIN TYPE
TYPE: SURGICAL PAIN

## 2025-01-06 NOTE — OR NURSING
0923- Pt to PACU from OR. Report from anesthesiologist and OR RN. Arrived on 6L mask with an OPA in place at 98%. Respirations even and unlabored. VSS. Fistula to LUE has thrill/bruit present. Dressing is CDI. Arm elevated straight and heat applied per MD Ureña orders.     0935- OPA d/c'ed    0942- patient awake and tolerating sips of water. Denies pain and nausea.     1012- Patient medicated for HTN per MAR. BP is 176/77    1016- Patient medicated for pain per MAR. Mom brought to the bedside.     1030- Discharge instructions discussed with patient and his mom. All questions answered. Verbalized understanding.    1055- PIV removed with tip intact.     1059- Pt escorted out of department in wheelchair with all belongings. Discharged home to responsible adult.

## 2025-01-06 NOTE — ANESTHESIA TIME REPORT
Anesthesia Start and Stop Event Times       Date Time Event    1/6/2025 0710 Ready for Procedure     0752 Anesthesia Start     0920 Anesthesia Stop          Responsible Staff  01/06/25      Name Role Begin End    Tobey Gansert, M.D. Anesth 0752 0920          Overtime Reason:  no overtime (within assigned shift)    Comments:

## 2025-01-06 NOTE — DISCHARGE INSTRUCTIONS
If any questions arise, call your provider.  If your provider is not available, please feel free to call the [facility] at [phone].    MEDICATIONS: Resume taking daily medication.  Take prescribed pain medication with food.  If no medication is prescribed, you may take non-aspirin pain medication if needed.  PAIN MEDICATION CAN BE VERY CONSTIPATING.  Take a stool softener or laxative such as senokot, pericolace, or milk of magnesia if needed.    SPECIAL INSTRUCTIONS:  1) Activities: No lifting more than half gallon of milk using left hand for 2 weeks.  Keep left arm straight and elevated as much as possible.  NO ice pack to incision.  NO blood pressure/IV/blood draws/sleeping on left arm.  May remove dressing after 3 days.  2) Medications: Resume home medications.  May take Tylenol as needed for mild pain.  For moderate pain, take Norco as prescribed.  Do not drive or operate machinery while taking Norco.  Do not take Norco on an empty stomach.  3) Follow-up with Flor in vascular clinic in 2 to 3 weeks.  Call [phone] for appointment or and for any problem.     Last pain medication given: 5mg of Oxycodone was given at 10:15. Next dose of Norco can be taken at 4:15pm if needed.     What to Expect Post Anesthesia    Rest and take it easy for the first 24 hours.  A responsible adult is recommended to remain with you during that time.  It is normal to feel sleepy.  We encourage you to not do anything that requires balance, judgment or coordination.    FOR 24 HOURS DO NOT:  Drive, operate machinery or run household appliances.  Drink beer or alcoholic beverages.  Make important decisions or sign legal documents.    To avoid nausea, slowly advance diet as tolerated, avoiding spicy or greasy foods for the first day.  Add more substantial food to your diet according to your provider's instructions.  Babies can be fed formula or breast milk as soon as they are hungry.  INCREASE FLUIDS AND FIBER TO AVOID  CONSTIPATION.    MILD FLU-LIKE SYMPTOMS ARE NORMAL.  YOU MAY EXPERIENCE GENERALIZED MUSCLE ACHES, THROAT IRRITATION, HEADACHE AND/OR SOME NAUSEA.

## 2025-01-06 NOTE — ANESTHESIA POSTPROCEDURE EVALUATION
Patient: Beni Moore    Procedure Summary       Date: 01/06/25 Room / Location: [facility] ROOM 23 / SURGERY SAME DAY [facility]    Anesthesia Start: 0752 Anesthesia Stop: 0920    Procedure: LEFT BRACHIOBASILIC FISTULA TRANSPOSITION (Left: Arm Upper) Diagnosis: (END STAGE RENAL DISEASE)    Surgeons: Joyce Ureña M.D. Responsible Provider: Tobey Gansert, M.D.    Anesthesia Type: general ASA Status: 3            Final Anesthesia Type: general  Last vitals  BP   Blood Pressure: (!) 141/66    Temp   36.1 °C (97 °F)    Pulse   75   Resp   18    SpO2   98 %      Anesthesia Post Evaluation    Patient location during evaluation: PACU  Patient participation: complete - patient participated  Level of consciousness: awake and alert    Airway patency: patent  Anesthetic complications: no  Cardiovascular status: hemodynamically stable  Respiratory status: acceptable  Hydration status: euvolemic    PONV: none          No notable events documented.     Nurse Pain Score: 0 (NPRS)

## 2025-01-06 NOTE — ANESTHESIA PREPROCEDURE EVALUATION
Case: [id number] Date/Time: 01/06/25 0715    Procedure: LEFT BRACHIOBASILIC FISTULA TRANSPOSITION (Left: Arm Upper)    Anesthesia type: General    Pre-op diagnosis: END STAGE RENAL DISEASE    Location: [facility] ROOM 23 / SURGERY SAME DAY [facility]    Surgeons: Joyce Ureña M.D.            Relevant Problems   CARDIAC   (positive) Coronary artery disease involving native coronary artery of native heart without angina pectoris   (positive) Hypertension   (positive) NSTEMI (non-ST elevated myocardial infarction) (HCC)   (positive) Presence of stent in coronary artery         (positive) Acute renal failure superimposed on stage 3b chronic kidney disease (HCC)   (positive) CKD (chronic kidney disease) stage 5, GFR less than 15 ml/min (HCC)   (positive) Hypertensive heart and chronic kidney disease with heart failure and stage 1 through stage 4 chronic kidney disease, or unspecified chronic kidney disease (HCC)   (positive) Type 2 diabetes mellitus with kidney complication, with long-term current use of insulin (HCC)      ENDO   (positive) Type 2 diabetes mellitus with kidney complication, with long-term current use of insulin (HCC)      Other   (positive) Osteomyelitis and soft tissue infection of left foot (HCC)       Physical Exam    Airway   Mallampati: II  TM distance: >3 FB  Neck ROM: full       Cardiovascular - normal exam  Rhythm: regular  Rate: normal  (-) murmur     Dental - normal exam           Pulmonary - normal exam  Breath sounds clear to auscultation     Abdominal    Neurological - normal exam                   Anesthesia Plan    ASA 3   ASA physical status 3 criteria: ESRD undergoing regularly scheduled dialysis and CAD/stents (> 3 months)    Plan - general       Airway plan will be LMA          Induction: intravenous    Postoperative Plan: Postoperative administration of opioids is intended.    Pertinent diagnostic labs and testing reviewed    Informed Consent:    Anesthetic plan and risks discussed with  patient.    Use of blood products discussed with: patient whom consented to blood products.

## 2025-01-06 NOTE — OP REPORT
DATE OF SERVICE:  01/06/2025     SURGEON:  Joyce Ureña MD     ASSISTANT:  Flor Gilbert PA-C     ANESTHESIOLOGIST:  Tobey Gansert, MD     TYPE OF ANESTHESIA:  General anesthesia and local anesthesia with 30 mL of   0.25% Marcaine solution.     PREOPERATIVE DIAGNOSIS:  End-stage renal disease, on hemodialysis.     POSTOPERATIVE DIAGNOSIS:  End-stage renal disease, on hemodialysis.     PROCEDURE:  Left brachiobasilic fistula transposition.     INDICATIONS FOR PROCEDURE:  This is a pleasant 54-year-old male with multiple   medical problems including end-stage renal disease, on hemodialysis, who   previously underwent stage I left brachiobasilic fistula creation.  He is now   taken back for transposition.  He would like to proceed, fully understanding   all risks.     DESCRIPTION OF PROCEDURE:  Informed consent was obtained.  The patient was   taken to the operating room and was placed in the supine position.  Sequential   compressive device was applied.  The patient was given Ancef intravenously.    General anesthesia was induced.     Next, his left upper extremity was sterilely prepped and draped in the normal   fashion.  A timeout procedure was done.  An incision was made over the course   of the brachiobasilic fistula.  The incision was extended through subcutaneous   tissues using the electrocautery.  The basilic fistula was identified,   carefully dissected free from the surrounding tissues.  The side branches were   ligated with 3-0 and 4-0 silk ties, clipped with Hemoclips and sharply   divided.  The nerves were identified, protected and preserved.     The patient was given heparin 3000 units intravenously.  After the heparin was   allowed to circulate systemically for 3 minutes, the fistula was clamped   above the arterial anastomosis and divided obliquely.  It was then tunneled   anteriorly and subcutaneously with care taken to avoid twisting of the   fistula.  The fistula was reanastomosed to itself  using running 7-0 Prolene   sutures.  Prior to completing the anastomosis, backbleeding and flushing were   obtained.  The anastomosis was completed.  Flow was restored through the   fistula.  Excellent thrills were felt.     The patient was given protamine 30 mg intravenously.  The wound was   anesthetized with 30 mL of 0.25% Marcaine solution.  The wound was irrigated,   was found to have excellent hemostasis.  The wound was closed subcutaneously   in layers with interrupted and running 3-0 Vicryl sutures and subcuticularly   with 4-0 Monocryl suture.  The wound was cleaned and sterile dressing was   applied.     ESTIMATED BLOOD LOSS:  25 mL.     COUNTS:  Sponge and instrument count was correct x2.     The patient was then awakened, extubated, taken to recovery area in stable   condition with excellent thrills over the fistula and intact neurovascular   examination of the left hand.        ______________________________  Joyce Ureña MD    TQN/AZM    DD:  01/06/2025 09:36  DT:  01/06/2025 10:16    Job#:  [id number]

## 2025-01-06 NOTE — H&P
Referring Provider: Varun Ortiz MD     Consulting Physician: Joyce Ureña MD - [facility]      -------------------------------------------------------------------------------------------------     Reason for consultation:  Access creation.     HPI:  This is a 54 y.o. left-handed male with multiple medical problems including end-stage renal disease who has been on hemodialysis since in January of this year.  Patient is only on hemodialysis 1 day a week on Wednesday.  Patient underwent stage 1 left brachiobasilic fistula creation.  He is here today for stage 2 or transposition.     Past Medical History        Past Medical History:   Diagnosis Date    Anesthesia       Hypotension post-op, persisted for a few months    Anesthesia 12/06/2023     Patient stated after 2016 colonoscopy he became hypotensive.    Bowel habit changes       History GI problems    Cataract       IOL - Left eye    Cataract       IOL OD    Deaf, right      Diabetes (HCC)       Oral medications & insulin-8/30/24 only insulin    Dialysis patient (HCC) 08/29/2024     once a week on wednesday at [facility]; takes Prilosec    Hemorrhagic disorder ([facility])       ASA protection for stent and cardiac history    History of non-ST elevation myocardial infarction (NSTEMI)       Per Problem List    Hx of heart artery stent 2019    Hyperlipidemia      Hyperparathyroidism due to renal insufficiency (HCC)       Per Problem List    Hypertension      Myocardial infarct ([facility])      Pneumonia       H/O    Renal disorder 12/06/2023     Stage 3 CKD            Past Surgical History         Past Surgical History:   Procedure Laterality Date    ORIF, FRACTURE, HUMERUS, PROXIMAL Left 8/30/2024     Procedure: LEFT OPEN REDUCTION INTERNAL FIXATION  HUMERUS, PROXIMAL, NON UNION REPAIR;  Surgeon: Aris Pierre M.D.;  Location: SURGERY [facility];  Service: Orthopedics    KNEE AMPUTATION BELOW Left 02/01/2024     Procedure: AMPUTATION, BELOW KNEE;   Surgeon: Celestino Segura M.D.;  Location: [facility];  Service: Orthopedics    TOE AMPUTATION Left 01/30/2024     Procedure: AMPUTATION, TOE- 5TH;  Surgeon: Celestino Segura M.D.;  Location: [facility];  Service: Orthopedics    PB OPEN TREATMENT PBOX HUMERAL FRACTURE Left 12/11/2023     Procedure: LEFT OPEN REDUCTION INTERNAL FIXATION  HUMERUS, PROXIMAL AND LEFT BICEPS TENODESIS;  Surgeon: Aris Pierre M.D.;  Location: [facility];  Service: Orthopedics    PB REPAIR BICEPS LONG TENDON Left 12/11/2023     Procedure: TENODESIS, BICEPS, OPEN;  Surgeon: Aris Pierre M.D.;  Location: [facility];  Service: Orthopedics    CATARACT EXTRACTION WITH IOL Right 2023    TOE AMPUTATION Left 09/30/2021     Procedure: AMPUTATION, TOE;  Surgeon: Tomer Hart M.D.;  Location: [facility];  Service: Orthopedics    STENT PLACEMENT   2019     STEMI with nonobstructive LAD    TOE AMPUTATION Right 07/02/2018     Procedure: Transmetatarsal amputation;  Surgeon: Ravi Bernardo M.D.;  Location: [facility];  Service: Orthopedics    ACHILLES TENDON REPAIR Right 07/02/2018     Procedure: ACHILLES LENGTHENING;  Surgeon: Ravi Bernardo M.D.;  Location: [facility];  Service: Orthopedics    IRRIGATION & DEBRIDEMENT ORTHO Right 07/02/2018     Procedure: IRRIGATION & DEBRIDEMENT ORTHO;  Surgeon: Ravi Bernardo M.D.;  Location: [facility];  Service: Orthopedics    OTHER Left       IOL    OTHER ABDOMINAL SURGERY        OTHER ORTHOPEDIC SURGERY        SHOULDER SURGERY   12/11/2023     Something is not right in my shoulder/arm    TONSILLECTOMY        VASECTOMY                Current Medications          Current Outpatient Medications   Medication Sig Dispense Refill    calcium acetate (PHOS-LO) 667 MG Cap          losartan (COZAAR) 100 MG Tab Take 100 mg by mouth every day.        Calcium Carbonate Antacid 1000 MG Chew Tab Chew 2,000 mg in the  morning, at noon, and at bedtime. 2000 mg before each meal        Insulin Degludec (TRESIBA) 100 UNIT/ML Solution Inject 24 Units under the skin at bedtime.        aspirin 81 MG EC tablet Take 1 Tablet by mouth every day. 30 Tablet 2    atorvastatin (LIPITOR) 80 MG tablet Take 1 Tablet by mouth every evening. 30 Tablet 2    torsemide (DEMADEX) 100 MG Tab Take 1 Tablet by mouth every day. 30 Tablet 2    HUMULIN R 100 UNIT/ML Solution Inject 4-8 Units under the skin 3 times a day before meals. Sliding Scale   150 - 200 = 3 units  201 - 250 = 4 units  251 - 300 = 5 units  301 - 350 = 6 units  351 - 400 = 7 units   PLUS CARB CORRECTION  of 1 unit for every 15 g carb        cholestyramine (QUESTRAN) 4 g packet Take 1 Packet by mouth 2 times a day.        omeprazole (PRILOSEC) 20 MG delayed-release capsule Take 20 mg by mouth every morning.          No current facility-administered medications for this visit.            Social History               Socioeconomic History    Marital status:        Spouse name: Not on file    Number of children: Not on file    Years of education: Not on file    Highest education level: Some college, no degree   Occupational History    Not on file   Tobacco Use    Smoking status: Former       Current packs/day: 0.00       Average packs/day: 0.6 packs/day for 17.0 years (10.0 ttl pk-yrs)       Types: Cigarettes       Start date: 1988       Quit date:        Years since quittin.8    Smokeless tobacco: Never   Vaping Use    Vaping status: Never Used   Substance and Sexual Activity    Alcohol use: Not Currently       Comment: Quit     Drug use: Not Currently       Types: Marijuana, Oral       Comment: CBD Edibles 3-4 times per week    Sexual activity: Not Currently       Partners: Female   Other Topics Concern    Not on file   Social History Narrative    Not on file      Social Determinants of Health           Financial Resource Strain: Low Risk  (2022)     Overall  Financial Resource Strain (CARDIA)      Difficulty of Paying Living Expenses: Not hard at all   Food Insecurity: No Food Insecurity (12/13/2022)     Hunger Vital Sign      Worried About Running Out of Food in the Last Year: Never true      Ran Out of Food in the Last Year: Never true   Transportation Needs: No Transportation Needs (2/8/2024)     PRAPARE - Transportation      Lack of Transportation (Medical): No      Lack of Transportation (Non-Medical): No   Physical Activity: Insufficiently Active (12/13/2022)     Exercise Vital Sign      Days of Exercise per Week: 2 days      Minutes of Exercise per Session: 40 min   Stress: No Stress Concern Present (12/13/2022)     [employer] - Occupational Stress Questionnaire      Feeling of Stress : Not at all   Social Connections: Moderately Integrated (12/13/2022)     Social Connection and Isolation Panel [NHANES]      Frequency of Communication with Friends and Family: More than three times a week      Frequency of Social Gatherings with Friends and Family: More than three times a week      Attends Restorationism Services: Never      Active Member of Clubs or Organizations: Yes      Attends Club or Organization Meetings: Never      Marital Status:    Intimate Partner Violence: Not on file   Housing Stability: Low Risk  (12/13/2022)     Housing Stability Vital Sign      Unable to Pay for Housing in the Last Year: No      Number of Places Lived in the Last Year: 1      Unstable Housing in the Last Year: No            Family History         Family History   Problem Relation Age of Onset    No Known Problems Mother      Diabetes Father      Heart Disease Father      Diabetes Maternal Grandmother      Heart Disease Maternal Grandfather      Lung Disease Paternal Grandmother      Cancer Paternal Grandmother      Cancer Paternal Grandfather      Lung Cancer Paternal Grandfather      Kidney Disease Neg Hx              Allergies:  Patient has no  known allergies.     Review of Systems:     Constitutional:          Negative for fever, chills, weight loss,   HENT:                         Negative for hearing loss or tinnitus    Eyes:                           Negative for blurred vision, double vision, or loss of vision  Respiratory:               Negative for cough, hemoptysis, or wheezing    Cardiac:                      Negative for chest pain or palpitations or orthopnea  Vascular:                    Negative for claudication or rest pain   Gastrointestinal:       Negative for nausea, vomiting, or abdominal pain                                      Negative for hematochezia or melena   Genitourinary:           Negative for dysuria, frequency, or hematuria   Musculoskeletal:       Negative for myalgias, back pain, or joint pain  Skin:                           Negative for itching or rash  Neurological:             Negative for dizziness, headaches, or tremors                                      Negative for speech disturbance                                      Negative for extremity weakness or paresthesias  Endo/Heme:               Positive for easy bruising or bleeding  Psychiatric:                Negative for depression, suicidal ideas, or hallucinations     Physical Exam:  BP (!) 142/82 (BP Location: Left arm, Patient Position: Sitting, BP Cuff Size: Adult)   Pulse 83   Temp 36.8 °C (98.2 °F) (Temporal)   Ht 1.829 m (6')   Wt 92.1 kg (203 lb)   SpO2 98%      Constitutional:          Alert, oriented, no acute distress  HEENT:                       Normocephalic and atraumatic, EOMI  Neck:                          Supple, no JVD, permacath in place.  Cardiovascular:         Regular rate and rhythm  Pulmonary:                Good air entry bilaterally  Abdominal:                Soft, non-tender, non-distended                                      Aortic impulse not widened  Musculoskeletal:       No edema, no tenderness  Neurological:              CN II-XII grossly intact, no focal deficits  Skin:                           Skin is warm and dry. No rash noted.  Psychiatric:                Normal mood and affect.  Upper extremities:    Well-healed left shoulder scar.  Palpable brachial and radial pulses bilaterally.  Patent left brachiobasilic fistula. Restricted ROM left shoulder.  Lower extremities:    Status post left below-knee amputation.  Right foot is warm, well-perfused, status post transmetatarsal amputation.  No ulceration.     Labs:  Reviewed.        Assessment:  -End-stage renal disease.  -Type 2 diabetes mellitus.  -Dyslipidemia.  -Hypertension.     Plan:  I had a long discussion with patient.   I therefore discussed with patient the option of undergoing stage 2 left brachiobasilic fistula transposition. Risks and benefit was discussed.  Risks include, but not limited to, bleeding, infection, nerve injury, seroma formation, and perioperative anesthetic complications.  Patient indicated understanding and would like to proceed, fully understanding all risks.  All questions were answered.

## 2025-01-06 NOTE — ANESTHESIA PROCEDURE NOTES
Airway    Date/Time: 1/6/2025 7:56 AM    Performed by: Tobey Gansert, M.D.  Authorized by: Tobey Gansert, M.D.    Location:  OR  Urgency:  Elective  Indications for Airway Management:  Anesthesia      Spontaneous Ventilation: absent    Sedation Level:  Deep  Preoxygenated: Yes    Final Airway Type:  Supraglottic airway  Final Supraglottic Airway:  Standard LMA    SGA Size:  4  Number of Attempts at Approach:  1

## 2025-01-06 NOTE — OR SURGEON
Immediate Post OP Note    PreOp Diagnosis: End-stage renal disease, on hemodialysis.      PostOp Diagnosis: Same.      Procedure(s):  LEFT BRACHIOBASILIC FISTULA TRANSPOSITION - Wound Class: Clean    Surgeon(s):  Joyce Ureña M.D.    Anesthesiologist/Type of Anesthesia:  Anesthesiologist: Tobey Gansert, M.D./General    Surgical Staff:  Circulator: MARCIO Valentino Circulator: Aicha Calhoun R.N.  Relief Scrub: Mora Mcmillan  Scrub Person: Francisco Puckett Assist: Flor Gilbert P.A.-C.    Specimens removed if any:  * No specimens in log *    Estimated Blood Loss: 25 mL.    IV fluid: 300 mL.    Findings: Great thrills post transposition.    Complications: None.    Dictated, #[id number].        1/6/2025 9:32 AM Joyce Ureña M.D.

## 2025-01-06 NOTE — OR NURSING
Anesthesia Dr. Gansert notified of a critical lab of Calcium 6.5 and corrected calcium 6.9 and K of 3.5

## 2025-01-07 ENCOUNTER — HOSPITAL ENCOUNTER (EMERGENCY)
Facility: MEDICAL CENTER | Age: 55
End: 2025-01-07
Attending: EMERGENCY MEDICINE
Payer: MEDICARE

## 2025-01-07 VITALS
SYSTOLIC BLOOD PRESSURE: 154 MMHG | OXYGEN SATURATION: 98 % | WEIGHT: 203.04 LBS | DIASTOLIC BLOOD PRESSURE: 70 MMHG | BODY MASS INDEX: 27.5 KG/M2 | TEMPERATURE: 97.7 F | HEIGHT: 72 IN | RESPIRATION RATE: 18 BRPM | HEART RATE: 77 BPM

## 2025-01-07 DIAGNOSIS — I77.0 A-V FISTULA (HCC): ICD-10-CM

## 2025-01-07 PROCEDURE — 99281 EMR DPT VST MAYX REQ PHY/QHP: CPT

## 2025-01-07 PROCEDURE — A6403 STERILE GAUZE>16 <= 48 SQ IN: HCPCS

## 2025-01-07 ASSESSMENT — FIBROSIS 4 INDEX: FIB4 SCORE: 1.56

## 2025-01-07 NOTE — ED NOTES
Written and verbal instructions provided to pt. Pt instructed to follow up with PCP. Pt instructed to return to emergency department for new or worsening symptoms. Pt verbalized understanding of discharge instructions. Provided with dressing change supplies for home. Pt ambulatory upon discharge with all belongings.

## 2025-01-07 NOTE — ED NOTES
Dressing applied per ERP. Pt given supplies to change dressing at home. Pt verbalizes understanding.

## 2025-01-07 NOTE — ED NOTES
Report from JULIANO Skelton. Pt on room air, respirations equal and unlabored. Bleeding controlled at this time. Call light within reach.

## 2025-01-07 NOTE — ED PROVIDER NOTES
ED Provider Note    CHIEF COMPLAINT  Chief Complaint   Patient presents with    Other       EXTERNAL RECORDS REVIEWED  Patient's recent Pap report from yesterday morning was reviewed when he underwent left brachiocephalic fistula transposition.    Hospital admission to physical medicine and rehab, February 8, discharged on February 22 with admission diagnosis including a history of left BKA, anemia, type 2 diabetes, CAD, diarrhea, hypertension and renal failure.    HPI/ROS  LIMITATION TO HISTORY   Select: : None  OUTSIDE HISTORIAN(S):  Parent father    Beni Moore is a 54 y.o. male who presents to the emergency department with a chief complaint of bleeding from a recently established AV fistula.  Patient states that this was done by Dr. Ureña, yesterday morning.  He initially had the creation of the fistula in November of last year and had a revision yesterday.  He noted that he soaked through the dressing about 3 AM last night.  Upon paramedic arrival, wound was still continuing to ooze.  Pressure dressing applied.  Patient does not report any other symptoms at this time.    PAST MEDICAL HISTORY   has a past medical history of Anesthesia, Anesthesia (12/06/2023), Bowel habit changes, Cataract, Deaf, right, Diabetes ([facility]), Dialysis patient ([facility]) (08/29/2024), Heart burn, Hemorrhagic disorder ([facility]), High cholesterol, History of non-ST elevation myocardial infarction (NSTEMI), heart artery stent (2019), Hyperlipidemia, Hyperparathyroidism due to renal insufficiency ([facility]), Hypertension, Myocardial infarct ([facility]) (2019), Pneumonia, and Renal disorder (12/06/2023).    SURGICAL HISTORY   has a past surgical history that includes other orthopedic surgery; toe amputation (Right, 07/02/2018); achilles tendon repair (Right, 07/02/2018); irrigation & debridement ortho (Right, 07/02/2018); vasectomy; tonsillectomy; stent placement (2019); toe amputation (Left, 09/30/2021); cataract extraction with iol (Bilateral, 2023);  open treatment prox humeral fracture (Left, 2023); repair biceps long tendon (Left, 2023); toe amputation (Left, 2024); knee amputation below (Left, 2024); shoulder surgery (2023); orif, fracture, humerus, proximal (Left, 2024); other cardiac surgery (2019); av fistula creation (Left, 2024); and av fistula revision (Left, 2025).    FAMILY HISTORY  Family History   Problem Relation Age of Onset    No Known Problems Mother     Diabetes Father         Adult onset    Heart Disease Father     Hypertension Father     Diabetes Maternal Grandmother     Heart Disease Maternal Grandfather     Lung Disease Paternal Grandmother     Cancer Paternal Grandmother     Cancer Paternal Grandfather     Lung Cancer Paternal Grandfather     Kidney Disease Neg Hx        SOCIAL HISTORY  Social History     Tobacco Use    Smoking status: Former     Current packs/day: 0.00     Average packs/day: 0.6 packs/day for 17.0 years (10.0 ttl pk-yrs)     Types: Cigarettes     Start date: 1988     Quit date:      Years since quittin.0     Passive exposure: Never    Smokeless tobacco: Never   Vaping Use    Vaping status: Never Used   Substance and Sexual Activity    Alcohol use: Not Currently     Comment: Quit     Drug use: Not Currently     Types: Oral     Comment: NOT CURRENTLY, HISTORY OF CBD    Sexual activity: Not Currently     Partners: Female       CURRENT MEDICATIONS  Home Medications       Reviewed by Meron Denton R.N. (Registered Nurse) on 25 at 0501  Med List Status: Partial     Medication Last Dose Status   aspirin 81 MG EC tablet  Active   atorvastatin (LIPITOR) 80 MG tablet  Active   calcium acetate (PHOS-LO) 667 MG Cap  Active   Calcium Carbonate Antacid 1000 MG Chew Tab  Active   cholestyramine (QUESTRAN) 4 g packet  Active   HUMULIN R 100 UNIT/ML Solution  Active   HYDROcodone-acetaminophen (NORCO) 5-325 MG Tab per tablet  Active   Insulin Degludec (TRESIBA)  100 UNIT/ML Solution  Active   losartan (COZAAR) 100 MG Tab  Active   omeprazole (PRILOSEC) 20 MG delayed-release capsule  Active   torsemide (DEMADEX) 100 MG Tab  Active                    ALLERGIES  No Known Allergies    PHYSICAL EXAM  VITAL SIGNS: BP (!) 154/70   Pulse 77   Temp 36.5 °C (97.7 °F) (Temporal)   Resp 18   Ht 1.829 m (6')   Wt 92.1 kg (203 lb 0.7 oz)   SpO2 98%   BMI 27.54 kg/m²    Vitals reviewed.  Constitutional: Patient is oriented to person, place, and time. Appears well-developed and well-nourished. No distress.    Head: Normocephalic and atraumatic.   Eyes: Conjunctivae are normal.  Cardiovascular: Normal rate, regular rhythm and normal heart sounds. Normal peripheral pulses.  Pulmonary/Chest: Effort normal and breath sounds normal. No respiratory distress  Musculoskeletal: No edema.  Left BKA  Neurological: Normal gait. No focal deficits.   Skin: Skin is warm and dry. No erythema. No pallor.  2 incisions over the upper, inner aspect of the left arm.  Dressing removed.  No active bleeding.  There is ecchymosis surrounding the 2 incisions.  Psychiatric: Patient has a normal mood and affect.     EKG/LABS    RADIOLOGY/PROCEDURES     COURSE & MEDICAL DECISION MAKING    ASSESSMENT, COURSE AND PLAN  Care Narrative:     5:46 AM this is a pleasant 54-year-old male.  He presents with bleeding from his newly established, left upper extremity AV fistula which was done by Dr. Ureña, yesterday morning.  He was discharged about 9 AM.  Initially, he did not notice any bleeding.  Then, about 3 AM this morning, he noticed that he bled through the dressing, prompting a call to 911.  Dressing was removed, no active bleeding.  Will continue to monitor.    7:25 AM patient is reevaluated at the bedside.  He said no significant bleeding from the site.  Will apply a nonadherent dressing.  Discussed this with nursing staff.  Vital signs are reassuring.  He is eager to go home.  He is advised that not  manipulating the area is likely best for the next few days so clot can mature.  If he has return of bleeding and it is a small amount, he can apply direct pressure if he is unable to get the bleeding to stop, he understands he will need to return for reevaluation and he is agreeable to this plan of care.    ADDITIONAL PROBLEMS MANAGED    DISPOSITION AND DISCUSSIONS  I have discussed management of the patient with the following physicians and CHRISTIANNE's:  None    Discussion of management with other QHP or appropriate source(s): None     Escalation of care considered, and ultimately not performed: None    Barriers to care at this time, including but not limited to: None.     Decision tools and prescription drugs considered including, but not limited to: None.    FINAL DIAGNOSIS  1. A-V fistula (HCC)    Bleeding from site     Electronically signed by: Stacy Paz D.O., 1/7/2025 5:38 AM

## 2025-01-07 NOTE — ED TRIAGE NOTES
Chief Complaint   Patient presents with    Other       Pt to triage ambulatory for above complaint. Presents with post op complications. States he had a fistula creation yesterday  for dialysis and started bleeding at 12MN; on ASA daily. Was seen by paramedic and pressure bandage was applied. State he came to get it checked out coz he is sure it's still bleeding inside. No bleeding noted on the dressing.     AOX4 GCS15     Pt back to lobby, educated on triage process and encourage to alert staff of any changes.     Vitals:    01/07/25 0444   BP: (!) 146/80   Pulse: 80   Resp: 16   Temp: 36.5 °C (97.7 °F)   SpO2: 98%

## 2025-01-08 NOTE — ED NOTES
Report given to floor RN .  Pt transported to floor.     Detail Level: Detailed Quality 487: Screening For Social Drivers Of Health: Patient screened for food insecurity, housing instability, transportation needs, utility difficulties, and interpersonal safety Quality 431: Preventive Care And Screening: Unhealthy Alcohol Use - Screening: Patient not identified as an unhealthy alcohol user when screened for unhealthy alcohol use using a systematic screening method Quality 226: Preventive Care And Screening: Tobacco Use: Screening And Cessation Intervention: Patient screened for tobacco use and is an ex/non-smoker

## 2025-01-29 DIAGNOSIS — I13.0 HYPERTENSIVE HEART AND CHRONIC KIDNEY DISEASE WITH HEART FAILURE AND STAGE 1 THROUGH STAGE 4 CHRONIC KIDNEY DISEASE, OR UNSPECIFIED CHRONIC KIDNEY DISEASE (HCC): ICD-10-CM

## 2025-01-29 RX ORDER — TORSEMIDE 100 MG/1
100 TABLET ORAL DAILY
Qty: 90 TABLET | Refills: 3 | Status: SHIPPED | OUTPATIENT
Start: 2025-01-29

## 2025-02-04 DIAGNOSIS — I13.0 HYPERTENSIVE HEART AND CHRONIC KIDNEY DISEASE WITH HEART FAILURE AND STAGE 1 THROUGH STAGE 4 CHRONIC KIDNEY DISEASE, OR UNSPECIFIED CHRONIC KIDNEY DISEASE (HCC): ICD-10-CM

## 2025-02-04 RX ORDER — METOLAZONE 5 MG/1
5 TABLET ORAL DAILY
Qty: 90 TABLET | Refills: 3 | Status: SHIPPED | OUTPATIENT
Start: 2025-02-04

## 2025-02-04 RX ORDER — LOSARTAN POTASSIUM 100 MG/1
100 TABLET ORAL DAILY
Qty: 90 TABLET | Refills: 3 | Status: SHIPPED | OUTPATIENT
Start: 2025-02-04

## 2025-02-04 RX ORDER — TORSEMIDE 100 MG/1
100 TABLET ORAL DAILY
Qty: 90 TABLET | Refills: 3 | Status: SHIPPED | OUTPATIENT
Start: 2025-02-04

## 2025-02-26 ENCOUNTER — TELEPHONE (OUTPATIENT)
Facility: MEDICAL CENTER | Age: 55
End: 2025-02-26
Payer: MEDICARE

## 2025-03-10 RX ORDER — CHOLESTYRAMINE 4 G/9G
1 POWDER, FOR SUSPENSION ORAL 2 TIMES DAILY
Qty: 60 EACH | Refills: 11 | Status: ON HOLD | OUTPATIENT
Start: 2025-03-10 | End: 2026-03-05

## 2025-03-11 ENCOUNTER — TELEPHONE (OUTPATIENT)
Facility: MEDICAL CENTER | Age: 55
End: 2025-03-11
Payer: MEDICARE

## 2025-03-11 NOTE — TELEPHONE ENCOUNTER
[facility]  Kidney Transplant Program- Referral Review    Patient Information  Patient Name:Beni Moore   YOB: 1970   MRN: [id number]   Preferred Language: Yes,  English  Address: [address] [id number]    Emergency Contact Name: Jonnathan Moore   Phone Number:   Relationship to Patient: Son [15]   Patient Email (optional): [email or web address]   [place] Citizenship: Yes  BMI: Estimated body mass index is 27.54 kg/m² as calculated from the following:    Height as of 1/7/25: 1.829 m (6').    Weight as of 1/7/25: 92.1 kg (203 lb 0.7 oz).     Referral Information  Dialysis: Yes  Unit Name: [facility]  [address]  [facility] [address]  Dialysis Modality (if applicable): HD  Dialysis Schedule: Monday/Wednesday only       Did patient provide social security number?  Yes  Is an  needed: No   Ambulatory assistance needed: Yes,  wheelchair has left prosthetic leg/below knee amputation, on right foot, metatarsal foot amputation  What hospital or medical center is most care received? [facility]  Have you had any recent hospitalizations? Yes   Hospital Name: [facility]   When: Beginning of January 2024 / Fistula    Specialty Providers  Do you have a PCP? Yes   Name: Ruben Pascual    PCP Phone:  Do you have a cardiologist or heart doctor? Yes   Name: Dr. Meeahn, [facility]   Do you have a urologist? No    Name:   Do you have any other specialty doctors (e.g.,Cardiology, Pulmonary, GI)?Yes   Names(s): [facility], can't remember provider's name, Endocrinologist Dr. Treasure Carrillo  Have you had a colonoscopy? Yes   When: 2018   Where: [facility]   Have you had a Mammogram? No    When:   Where:    Provider information will be added to care team and most recent notes and testing will be requested.     Transplant Information:  Previously received a transplant? No   Organ: N/A  Where:  When:  Currently listed for a transplant? No   If yes, list the transplant  center(s):  Currently being evaluated for a transplant but not yet listed? No   If yes, list the transplant center(s):  Potential living donors? Yes  Name(s):  Genesis Moore  Relationship(s): Mother     Insurance Information  Primary Insurance: Medicare A&B  Secondary Insurance (if applicable): N/A  Insurance Cards (front and back) Uploaded: Yes    Documentation Checklist  The following documents are required and will be requested if not already provided:  Demographics Sheet  Most Recent H&P (History and Physical)  Most Recent Nephrology Notes  Current Medication List  Most Recent Labs  Most Recent Hospital Discharge Summary (if available)  Vaccine Records  CMS 2728 Form (required for ESRD patients)      Records will be requested and sent to RN coordinator for further review and plan. Patient encouraged to contact us with any questions or medical updates in the interm.     At this time, does [facility] have verbal consent from you, Beni Moore, to request records from external facilities for your consult/evaluation? Yes  If patient stated no, we advised that the patient is required to obtain their records and bring them in with them to their first appointment.       Electronically Signed by   Seda Dimas  3/11/2025 8:42 AM

## 2025-03-21 DIAGNOSIS — I25.10 CORONARY ARTERY DISEASE INVOLVING NATIVE CORONARY ARTERY OF NATIVE HEART WITHOUT ANGINA PECTORIS: ICD-10-CM

## 2025-03-21 NOTE — TELEPHONE ENCOUNTER
Patient is in the hospital. His wife Alvarez  [phone] would like to speak with you regarding Kidney transplant.      
unable to perform

## 2025-03-27 ENCOUNTER — TELEPHONE (OUTPATIENT)
Facility: MEDICAL CENTER | Age: 55
End: 2025-03-27
Payer: MEDICARE

## 2025-03-27 PROCEDURE — 5A1D70Z PERFORMANCE OF URINARY FILTRATION, INTERMITTENT, LESS THAN 6 HOURS PER DAY: ICD-10-PCS | Performed by: INTERNAL MEDICINE

## 2025-03-27 NOTE — TELEPHONE ENCOUNTER
Patient called to inquire about status of transplant referral and health insurance coverage. States he has possible living donor. Told patient we are working with insurance companies and we will keep in touch as we have more information on approvals.

## 2025-03-28 ENCOUNTER — OFFICE VISIT (OUTPATIENT)
Dept: URGENT CARE | Facility: PHYSICIAN GROUP | Age: 55
End: 2025-03-28
Payer: MEDICARE

## 2025-03-28 ENCOUNTER — APPOINTMENT (OUTPATIENT)
Dept: RADIOLOGY | Facility: MEDICAL CENTER | Age: 55
DRG: 564 | End: 2025-03-28
Attending: EMERGENCY MEDICINE
Payer: MEDICARE

## 2025-03-28 ENCOUNTER — HOSPITAL ENCOUNTER (INPATIENT)
Facility: MEDICAL CENTER | Age: 55
End: 2025-03-28
Attending: EMERGENCY MEDICINE | Admitting: STUDENT IN AN ORGANIZED HEALTH CARE EDUCATION/TRAINING PROGRAM
Payer: MEDICARE

## 2025-03-28 VITALS
OXYGEN SATURATION: 98 % | SYSTOLIC BLOOD PRESSURE: 190 MMHG | TEMPERATURE: 100.2 F | HEART RATE: 106 BPM | DIASTOLIC BLOOD PRESSURE: 90 MMHG | WEIGHT: 229 LBS | HEIGHT: 72 IN | RESPIRATION RATE: 20 BRPM | BODY MASS INDEX: 31.02 KG/M2

## 2025-03-28 DIAGNOSIS — N18.6 CKD (CHRONIC KIDNEY DISEASE) REQUIRING CHRONIC DIALYSIS (HCC): ICD-10-CM

## 2025-03-28 DIAGNOSIS — L03.116 CELLULITIS OF LEFT THIGH: ICD-10-CM

## 2025-03-28 DIAGNOSIS — R65.10 SIRS (SYSTEMIC INFLAMMATORY RESPONSE SYNDROME) (HCC): ICD-10-CM

## 2025-03-28 DIAGNOSIS — L08.9 SOFT TISSUE INFECTION OF FOOT: ICD-10-CM

## 2025-03-28 DIAGNOSIS — E78.5 HYPERLIPIDEMIA, UNSPECIFIED HYPERLIPIDEMIA TYPE: ICD-10-CM

## 2025-03-28 DIAGNOSIS — I16.0 HYPERTENSIVE URGENCY: ICD-10-CM

## 2025-03-28 DIAGNOSIS — T87.40: ICD-10-CM

## 2025-03-28 DIAGNOSIS — Z79.4 TYPE 2 DIABETES MELLITUS WITH STAGE 5 CHRONIC KIDNEY DISEASE NOT ON CHRONIC DIALYSIS, WITH LONG-TERM CURRENT USE OF INSULIN (HCC): ICD-10-CM

## 2025-03-28 DIAGNOSIS — L03.116 CELLULITIS OF LEFT LOWER EXTREMITY: ICD-10-CM

## 2025-03-28 DIAGNOSIS — N18.5 TYPE 2 DIABETES MELLITUS WITH STAGE 5 CHRONIC KIDNEY DISEASE NOT ON CHRONIC DIALYSIS, WITH LONG-TERM CURRENT USE OF INSULIN (HCC): ICD-10-CM

## 2025-03-28 DIAGNOSIS — Z99.2 CKD (CHRONIC KIDNEY DISEASE) REQUIRING CHRONIC DIALYSIS (HCC): ICD-10-CM

## 2025-03-28 DIAGNOSIS — E11.22 TYPE 2 DIABETES MELLITUS WITH STAGE 5 CHRONIC KIDNEY DISEASE NOT ON CHRONIC DIALYSIS, WITH LONG-TERM CURRENT USE OF INSULIN (HCC): ICD-10-CM

## 2025-03-28 DIAGNOSIS — E11.3599 TYPE 2 DIABETES MELLITUS WITH PROLIFERATIVE RETINOPATHY WITHOUT MACULAR EDEMA, WITHOUT LONG-TERM CURRENT USE OF INSULIN, UNSPECIFIED LATERALITY (HCC): ICD-10-CM

## 2025-03-28 PROBLEM — K63.9: Status: ACTIVE | Noted: 2021-09-30

## 2025-03-28 PROBLEM — A41.9 SEPSIS (HCC): Status: ACTIVE | Noted: 2025-03-28

## 2025-03-28 PROBLEM — E11.69: Status: ACTIVE | Noted: 2021-09-30

## 2025-03-28 LAB
ABO GROUP BLD: NORMAL
ALBUMIN SERPL BCP-MCNC: 3.8 G/DL (ref 3.2–4.9)
ALBUMIN/GLOB SERPL: 1.2 G/DL
ALP SERPL-CCNC: 58 U/L (ref 30–99)
ALT SERPL-CCNC: 9 U/L (ref 2–50)
ANION GAP SERPL CALC-SCNC: 15 MMOL/L (ref 7–16)
AST SERPL-CCNC: 19 U/L (ref 12–45)
BACTERIA BLD CULT: NORMAL
BARCODED ABORH UBTYP: 6200
BARCODED ABORH UBTYP: 6200
BARCODED PRD CODE UBPRD: NORMAL
BARCODED PRD CODE UBPRD: NORMAL
BARCODED UNIT NUM UBUNT: NORMAL
BARCODED UNIT NUM UBUNT: NORMAL
BASOPHILS # BLD AUTO: 0.4 % (ref 0–1.8)
BASOPHILS # BLD: 0.05 K/UL (ref 0–0.12)
BILIRUB SERPL-MCNC: 1.2 MG/DL (ref 0.1–1.5)
BLD GP AB SCN SERPL QL: NORMAL
BUN SERPL-MCNC: 51 MG/DL (ref 8–22)
CALCIUM ALBUM COR SERPL-MCNC: 8.5 MG/DL (ref 8.5–10.5)
CALCIUM SERPL-MCNC: 8.3 MG/DL (ref 8.5–10.5)
CHLORIDE SERPL-SCNC: 94 MMOL/L (ref 96–112)
CO2 SERPL-SCNC: 23 MMOL/L (ref 20–33)
COMPONENT R 8504R: NORMAL
COMPONENT R 8504R: NORMAL
CREAT SERPL-MCNC: 6.48 MG/DL (ref 0.5–1.4)
EOSINOPHIL # BLD AUTO: 0.02 K/UL (ref 0–0.51)
EOSINOPHIL NFR BLD: 0.2 % (ref 0–6.9)
ERYTHROCYTE [DISTWIDTH] IN BLOOD BY AUTOMATED COUNT: 43.4 FL (ref 35.9–50)
FLUAV RNA SPEC QL NAA+PROBE: NEGATIVE
FLUBV RNA SPEC QL NAA+PROBE: NEGATIVE
GFR SERPLBLD CREATININE-BSD FMLA CKD-EPI: 9 ML/MIN/1.73 M 2
GLOBULIN SER CALC-MCNC: 3.2 G/DL (ref 1.9–3.5)
GLUCOSE BLD STRIP.AUTO-MCNC: 129 MG/DL (ref 65–99)
GLUCOSE SERPL-MCNC: 179 MG/DL (ref 65–99)
HCT VFR BLD AUTO: 22.9 % (ref 42–52)
HGB BLD-MCNC: 7.4 G/DL (ref 14–18)
IMM GRANULOCYTES # BLD AUTO: 0.1 K/UL (ref 0–0.11)
IMM GRANULOCYTES NFR BLD AUTO: 0.8 % (ref 0–0.9)
INR PPP: 1.2 (ref 0.87–1.13)
LACTATE SERPL-SCNC: 1.5 MMOL/L (ref 0.5–2)
LYMPHOCYTES # BLD AUTO: 0.65 K/UL (ref 1–4.8)
LYMPHOCYTES NFR BLD: 5.2 % (ref 22–41)
MAGNESIUM SERPL-MCNC: 1.6 MG/DL (ref 1.5–2.5)
MCH RBC QN AUTO: 30.6 PG (ref 27–33)
MCHC RBC AUTO-ENTMCNC: 32.3 G/DL (ref 32.3–36.5)
MCV RBC AUTO: 94.6 FL (ref 81.4–97.8)
MONOCYTES # BLD AUTO: 0.83 K/UL (ref 0–0.85)
MONOCYTES NFR BLD AUTO: 6.6 % (ref 0–13.4)
NEUTROPHILS # BLD AUTO: 10.89 K/UL (ref 1.82–7.42)
NEUTROPHILS NFR BLD: 86.8 % (ref 44–72)
NRBC # BLD AUTO: 0 K/UL
NRBC BLD-RTO: 0 /100 WBC (ref 0–0.2)
PLATELET # BLD AUTO: 150 K/UL (ref 164–446)
PMV BLD AUTO: 10.9 FL (ref 9–12.9)
POTASSIUM SERPL-SCNC: 4.3 MMOL/L (ref 3.6–5.5)
PRODUCT TYPE UPROD: NORMAL
PRODUCT TYPE UPROD: NORMAL
PROT SERPL-MCNC: 7 G/DL (ref 6–8.2)
PROTHROMBIN TIME: 15.2 SEC (ref 12–14.6)
RBC # BLD AUTO: 2.42 M/UL (ref 4.7–6.1)
RH BLD: NORMAL
RSV RNA SPEC QL NAA+PROBE: NEGATIVE
SARS-COV-2 RNA RESP QL NAA+PROBE: NOTDETECTED
SIGNIFICANT IND 70042: NORMAL
SITE SITE: NORMAL
SODIUM SERPL-SCNC: 132 MMOL/L (ref 135–145)
SOURCE SOURCE: NORMAL
UNIT STATUS USTAT: NORMAL
UNIT STATUS USTAT: NORMAL
WBC # BLD AUTO: 12.5 K/UL (ref 4.8–10.8)

## 2025-03-28 PROCEDURE — 700117 HCHG RX CONTRAST REV CODE 255: Performed by: EMERGENCY MEDICINE

## 2025-03-28 PROCEDURE — 87181 SC STD AGAR DILUTION PER AGT: CPT

## 2025-03-28 PROCEDURE — 700105 HCHG RX REV CODE 258: Performed by: EMERGENCY MEDICINE

## 2025-03-28 PROCEDURE — 96375 TX/PRO/DX INJ NEW DRUG ADDON: CPT

## 2025-03-28 PROCEDURE — 86901 BLOOD TYPING SEROLOGIC RH(D): CPT

## 2025-03-28 PROCEDURE — 770020 HCHG ROOM/CARE - TELE (206)

## 2025-03-28 PROCEDURE — 87641 MR-STAPH DNA AMP PROBE: CPT

## 2025-03-28 PROCEDURE — 36415 COLL VENOUS BLD VENIPUNCTURE: CPT

## 2025-03-28 PROCEDURE — 83605 ASSAY OF LACTIC ACID: CPT

## 2025-03-28 PROCEDURE — 85025 COMPLETE CBC W/AUTO DIFF WBC: CPT

## 2025-03-28 PROCEDURE — 99223 1ST HOSP IP/OBS HIGH 75: CPT | Mod: GC | Performed by: STUDENT IN AN ORGANIZED HEALTH CARE EDUCATION/TRAINING PROGRAM

## 2025-03-28 PROCEDURE — 99285 EMERGENCY DEPT VISIT HI MDM: CPT

## 2025-03-28 PROCEDURE — 86900 BLOOD TYPING SEROLOGIC ABO: CPT

## 2025-03-28 PROCEDURE — 85610 PROTHROMBIN TIME: CPT

## 2025-03-28 PROCEDURE — 83735 ASSAY OF MAGNESIUM: CPT

## 2025-03-28 PROCEDURE — 71045 X-RAY EXAM CHEST 1 VIEW: CPT

## 2025-03-28 PROCEDURE — 82962 GLUCOSE BLOOD TEST: CPT

## 2025-03-28 PROCEDURE — 87077 CULTURE AEROBIC IDENTIFY: CPT

## 2025-03-28 PROCEDURE — 96367 TX/PROPH/DG ADDL SEQ IV INF: CPT

## 2025-03-28 PROCEDURE — 700102 HCHG RX REV CODE 250 W/ 637 OVERRIDE(OP)

## 2025-03-28 PROCEDURE — 86850 RBC ANTIBODY SCREEN: CPT

## 2025-03-28 PROCEDURE — 80053 COMPREHEN METABOLIC PANEL: CPT

## 2025-03-28 PROCEDURE — 96376 TX/PRO/DX INJ SAME DRUG ADON: CPT

## 2025-03-28 PROCEDURE — 700111 HCHG RX REV CODE 636 W/ 250 OVERRIDE (IP)

## 2025-03-28 PROCEDURE — 87040 BLOOD CULTURE FOR BACTERIA: CPT

## 2025-03-28 PROCEDURE — 96366 THER/PROPH/DIAG IV INF ADDON: CPT

## 2025-03-28 PROCEDURE — 73701 CT LOWER EXTREMITY W/DYE: CPT | Mod: LT

## 2025-03-28 PROCEDURE — 87015 SPECIMEN INFECT AGNT CONCNTJ: CPT

## 2025-03-28 PROCEDURE — 0241U HCHG SARS-COV-2 COVID-19 NFCT DS RESP RNA 4 TRGT ED POC: CPT

## 2025-03-28 PROCEDURE — A9270 NON-COVERED ITEM OR SERVICE: HCPCS

## 2025-03-28 PROCEDURE — 96365 THER/PROPH/DIAG IV INF INIT: CPT

## 2025-03-28 PROCEDURE — 700111 HCHG RX REV CODE 636 W/ 250 OVERRIDE (IP): Mod: JZ | Performed by: EMERGENCY MEDICINE

## 2025-03-28 RX ORDER — LABETALOL HYDROCHLORIDE 5 MG/ML
10 INJECTION, SOLUTION INTRAVENOUS EVERY 4 HOURS PRN
Status: DISCONTINUED | OUTPATIENT
Start: 2025-03-28 | End: 2025-04-02 | Stop reason: HOSPADM

## 2025-03-28 RX ORDER — OXYCODONE HYDROCHLORIDE 5 MG/1
2.5 TABLET ORAL EVERY 8 HOURS PRN
Refills: 0 | Status: DISCONTINUED | OUTPATIENT
Start: 2025-03-28 | End: 2025-03-30

## 2025-03-28 RX ORDER — ATORVASTATIN CALCIUM 80 MG/1
80 TABLET, FILM COATED ORAL EVERY EVENING
Status: DISCONTINUED | OUTPATIENT
Start: 2025-03-28 | End: 2025-04-02 | Stop reason: HOSPADM

## 2025-03-28 RX ORDER — HEPARIN SODIUM 5000 [USP'U]/ML
5000 INJECTION, SOLUTION INTRAVENOUS; SUBCUTANEOUS EVERY 8 HOURS
Status: DISCONTINUED | OUTPATIENT
Start: 2025-03-28 | End: 2025-04-02 | Stop reason: HOSPADM

## 2025-03-28 RX ORDER — HYDRALAZINE HYDROCHLORIDE 20 MG/ML
20 INJECTION INTRAMUSCULAR; INTRAVENOUS ONCE
Status: COMPLETED | OUTPATIENT
Start: 2025-03-28 | End: 2025-03-28

## 2025-03-28 RX ORDER — ACETAMINOPHEN 500 MG
1000 TABLET ORAL EVERY 6 HOURS PRN
Status: DISCONTINUED | OUTPATIENT
Start: 2025-03-28 | End: 2025-04-02 | Stop reason: HOSPADM

## 2025-03-28 RX ORDER — LOSARTAN POTASSIUM 50 MG/1
100 TABLET ORAL DAILY
Status: DISCONTINUED | OUTPATIENT
Start: 2025-03-29 | End: 2025-04-02 | Stop reason: HOSPADM

## 2025-03-28 RX ORDER — DEXTROSE MONOHYDRATE 25 G/50ML
25 INJECTION, SOLUTION INTRAVENOUS
Status: DISCONTINUED | OUTPATIENT
Start: 2025-03-28 | End: 2025-04-02 | Stop reason: HOSPADM

## 2025-03-28 RX ORDER — OMEPRAZOLE 20 MG/1
20 CAPSULE, DELAYED RELEASE ORAL EVERY MORNING
Status: DISCONTINUED | OUTPATIENT
Start: 2025-03-29 | End: 2025-04-02 | Stop reason: HOSPADM

## 2025-03-28 RX ORDER — ACETAMINOPHEN/DIPHENHYDRAMINE 500MG-25MG
3-4 TABLET ORAL NIGHTLY PRN
Status: ON HOLD | COMMUNITY
End: 2025-04-02

## 2025-03-28 RX ORDER — CHOLESTYRAMINE LIGHT 4 G/5.7G
1 POWDER, FOR SUSPENSION ORAL 2 TIMES DAILY
Status: DISCONTINUED | OUTPATIENT
Start: 2025-03-28 | End: 2025-04-02 | Stop reason: HOSPADM

## 2025-03-28 RX ORDER — MORPHINE SULFATE 4 MG/ML
4 INJECTION INTRAVENOUS ONCE
Status: COMPLETED | OUTPATIENT
Start: 2025-03-28 | End: 2025-03-28

## 2025-03-28 RX ORDER — TORSEMIDE 100 MG/1
100 TABLET ORAL DAILY
Status: DISCONTINUED | OUTPATIENT
Start: 2025-03-29 | End: 2025-04-02 | Stop reason: HOSPADM

## 2025-03-28 RX ORDER — INSULIN LISPRO 100 [IU]/ML
1-6 INJECTION, SOLUTION INTRAVENOUS; SUBCUTANEOUS
Status: DISCONTINUED | OUTPATIENT
Start: 2025-03-28 | End: 2025-04-02 | Stop reason: HOSPADM

## 2025-03-28 RX ORDER — GUAIFENESIN/DEXTROMETHORPHAN 100-10MG/5
5 SYRUP ORAL EVERY 6 HOURS PRN
Status: DISCONTINUED | OUTPATIENT
Start: 2025-03-28 | End: 2025-04-02 | Stop reason: HOSPADM

## 2025-03-28 RX ORDER — ONDANSETRON 2 MG/ML
4 INJECTION INTRAMUSCULAR; INTRAVENOUS ONCE
Status: COMPLETED | OUTPATIENT
Start: 2025-03-28 | End: 2025-03-28

## 2025-03-28 RX ORDER — OXYCODONE HYDROCHLORIDE 5 MG/1
5 TABLET ORAL EVERY 8 HOURS PRN
Refills: 0 | Status: DISCONTINUED | OUTPATIENT
Start: 2025-03-28 | End: 2025-03-30

## 2025-03-28 RX ORDER — METOLAZONE 5 MG/1
5 TABLET ORAL DAILY
Status: DISCONTINUED | OUTPATIENT
Start: 2025-03-29 | End: 2025-04-02 | Stop reason: HOSPADM

## 2025-03-28 RX ORDER — HYDROMORPHONE HYDROCHLORIDE 1 MG/ML
0.25 INJECTION, SOLUTION INTRAMUSCULAR; INTRAVENOUS; SUBCUTANEOUS EVERY 6 HOURS PRN
Status: DISCONTINUED | OUTPATIENT
Start: 2025-03-28 | End: 2025-03-30

## 2025-03-28 RX ADMIN — INSULIN GLARGINE-YFGN 24 UNITS: 100 INJECTION, SOLUTION SUBCUTANEOUS at 21:39

## 2025-03-28 RX ADMIN — HYDRALAZINE HYDROCHLORIDE 20 MG: 20 INJECTION, SOLUTION INTRAMUSCULAR; INTRAVENOUS at 19:17

## 2025-03-28 RX ADMIN — HYDRALAZINE HYDROCHLORIDE 20 MG: 20 INJECTION, SOLUTION INTRAMUSCULAR; INTRAVENOUS at 17:06

## 2025-03-28 RX ADMIN — IOHEXOL 100 ML: 350 INJECTION, SOLUTION INTRAVENOUS at 19:45

## 2025-03-28 RX ADMIN — ATORVASTATIN CALCIUM 80 MG: 80 TABLET, FILM COATED ORAL at 20:14

## 2025-03-28 RX ADMIN — VANCOMYCIN HYDROCHLORIDE 2500 MG: 5 INJECTION, POWDER, LYOPHILIZED, FOR SOLUTION INTRAVENOUS at 17:25

## 2025-03-28 RX ADMIN — CHOLESTYRAMINE 4 G: 4 POWDER, FOR SUSPENSION ORAL at 21:37

## 2025-03-28 RX ADMIN — AMPICILLIN AND SULBACTAM 3 G: 1; 2 INJECTION, POWDER, FOR SOLUTION INTRAMUSCULAR; INTRAVENOUS at 16:48

## 2025-03-28 RX ADMIN — MORPHINE SULFATE 4 MG: 4 INJECTION INTRAVENOUS at 16:48

## 2025-03-28 RX ADMIN — OXYCODONE HYDROCHLORIDE 5 MG: 5 TABLET ORAL at 20:38

## 2025-03-28 RX ADMIN — HEPARIN SODIUM 5000 UNITS: 5000 INJECTION, SOLUTION INTRAVENOUS; SUBCUTANEOUS at 21:37

## 2025-03-28 RX ADMIN — ONDANSETRON 4 MG: 2 INJECTION INTRAMUSCULAR; INTRAVENOUS at 16:48

## 2025-03-28 ASSESSMENT — COGNITIVE AND FUNCTIONAL STATUS - GENERAL
MOVING FROM LYING ON BACK TO SITTING ON SIDE OF FLAT BED: A LOT
TOILETING: A LOT
MOBILITY SCORE: 12
CLIMB 3 TO 5 STEPS WITH RAILING: A LOT
SUGGESTED CMS G CODE MODIFIER DAILY ACTIVITY: CK
HELP NEEDED FOR BATHING: A LOT
DRESSING REGULAR UPPER BODY CLOTHING: A LITTLE
DAILY ACTIVITIY SCORE: 17
TURNING FROM BACK TO SIDE WHILE IN FLAT BAD: A LOT
WALKING IN HOSPITAL ROOM: A LOT
STANDING UP FROM CHAIR USING ARMS: A LOT
DRESSING REGULAR LOWER BODY CLOTHING: A LOT
SUGGESTED CMS G CODE MODIFIER MOBILITY: CL
MOVING TO AND FROM BED TO CHAIR: A LOT

## 2025-03-28 ASSESSMENT — LIFESTYLE VARIABLES
TOTAL SCORE: 0
TOTAL SCORE: 0
HOW MANY TIMES IN THE PAST YEAR HAVE YOU HAD 5 OR MORE DRINKS IN A DAY: 0
HAVE YOU EVER FELT YOU SHOULD CUT DOWN ON YOUR DRINKING: NO
TOTAL SCORE: 0
EVER FELT BAD OR GUILTY ABOUT YOUR DRINKING: NO
CONSUMPTION TOTAL: NEGATIVE
EVER HAD A DRINK FIRST THING IN THE MORNING TO STEADY YOUR NERVES TO GET RID OF A HANGOVER: NO
ON A TYPICAL DAY WHEN YOU DRINK ALCOHOL HOW MANY DRINKS DO YOU HAVE: 0
ALCOHOL_USE: NO
HAVE PEOPLE ANNOYED YOU BY CRITICIZING YOUR DRINKING: NO
AVERAGE NUMBER OF DAYS PER WEEK YOU HAVE A DRINK CONTAINING ALCOHOL: 0

## 2025-03-28 ASSESSMENT — PAIN DESCRIPTION - PAIN TYPE
TYPE: ACUTE PAIN

## 2025-03-28 ASSESSMENT — SOCIAL DETERMINANTS OF HEALTH (SDOH)
WITHIN THE PAST 12 MONTHS, THE FOOD YOU BOUGHT JUST DIDN'T LAST AND YOU DIDN'T HAVE MONEY TO GET MORE: NEVER TRUE
WITHIN THE PAST 12 MONTHS, YOU WORRIED THAT YOUR FOOD WOULD RUN OUT BEFORE YOU GOT THE MONEY TO BUY MORE: NEVER TRUE
WITHIN THE LAST YEAR, HAVE YOU BEEN HUMILIATED OR EMOTIONALLY ABUSED IN OTHER WAYS BY YOUR PARTNER OR EX-PARTNER?: NO
IN THE PAST 12 MONTHS, HAS THE ELECTRIC, GAS, OIL, OR WATER COMPANY THREATENED TO SHUT OFF SERVICE IN YOUR HOME?: NO
WITHIN THE LAST YEAR, HAVE TO BEEN RAPED OR FORCED TO HAVE ANY KIND OF SEXUAL ACTIVITY BY YOUR PARTNER OR EX-PARTNER?: NO
WITHIN THE LAST YEAR, HAVE YOU BEEN KICKED, HIT, SLAPPED, OR OTHERWISE PHYSICALLY HURT BY YOUR PARTNER OR EX-PARTNER?: NO
WITHIN THE LAST YEAR, HAVE YOU BEEN AFRAID OF YOUR PARTNER OR EX-PARTNER?: NO

## 2025-03-28 ASSESSMENT — FIBROSIS 4 INDEX
FIB4 SCORE: 1.56
FIB4 SCORE: 2.36
FIB4 SCORE: 1.56

## 2025-03-28 NOTE — PROGRESS NOTES
Subjective:     Beni Moore is a 54 y.o. male who presents for Wound Infection (Pt has a open sore on the stump of his left leg amputation x 1 week, had the amputation done February, pt sts since yesterday he has been clammy and feverish with sweats and feeling dehydrated )      Wound Infection      Pt presents for evaluation of a new problem.  Beni is a pleasant 54-year-old male who presents to urgent care today with complaints of an infection to his left leg stump that he first noticed 1 week ago.  He did undergo a below the knee amputation from Dr. Mcmanus in in February.  This has become progressively worse and more painful.  He is now having bleeding from his wound, fever, chills, fatigue, body aches.  Patient is a dialysis patient with his last dialysis yesterday.  He notes that he is currently 30 pounds overweight as they were not able to pull enough fluid off of him yesterday.   Pertinent medical history includes diabetes type 2, diabetes, history of MI non-STEMI, coronary artery disease.     ROS    PMH:   Past Medical History:   Diagnosis Date    Anesthesia     Hypotension post-op, persisted for a few months x 1    Anesthesia 12/06/2023    Patient stated after 2016 colonoscopy he became hypotensive.    Bowel habit changes     History GI problems    Cataract     bilateral IOL    Deaf, right     Diabetes (HCC)     Oral medications & insulin-8/30/24 only insulin    Dialysis patient (HCC) 08/29/2024    once a week on wednesday at [facility]    Heart burn     GERD; takes Prilosec    Hemorrhagic disorder (HCC)     ASA protection for stent and cardiac history    High cholesterol     medicated x 2    History of non-ST elevation myocardial infarction (NSTEMI)     Per Problem List    Hx of heart artery stent 2019    Hyperlipidemia     Hyperparathyroidism due to renal insufficiency (HCC)     Per Problem List    Hypertension     medicated    Myocardial infarct (HCC) 2019    FOLLOWED BY DR MORGAN    Pneumonia     H/O     Renal disorder 12/06/2023    Stage 3 CKD; 12/9/2024 dialysis at [facility] on Wednesday's only via CVC     ALLERGIES: No Known Allergies  SURGHX:   Past Surgical History:   Procedure Laterality Date    AV FISTULA REVISION Left 1/6/2025    Procedure: LEFT BRACHIOBASILIC FISTULA TRANSPOSITION;  Surgeon: Joyce Ureña M.D.;  Location: SURGERY SAME DAY [facility];  Service: General    AV FISTULA CREATION Left 11/04/2024    Procedure: CREATION OF LEFT UPPER EXTREMITY  DIALYSIS FISTULA;  Surgeon: Joyce Ureña M.D.;  Location: SURGERY SAME DAY [facility];  Service: General    ORIF, FRACTURE, HUMERUS, PROXIMAL Left 08/30/2024    Procedure: LEFT OPEN REDUCTION INTERNAL FIXATION  HUMERUS, PROXIMAL, NON UNION REPAIR;  Surgeon: Aris Pierre M.D.;  Location: [facility];  Service: Orthopedics    KNEE AMPUTATION BELOW Left 02/01/2024    Procedure: AMPUTATION, BELOW KNEE;  Surgeon: Celestino Segura M.D.;  Location: [facility];  Service: Orthopedics    TOE AMPUTATION Left 01/30/2024    Procedure: AMPUTATION, TOE- 5TH;  Surgeon: Celestino Segura M.D.;  Location: [facility];  Service: Orthopedics    PB OPEN TREATMENT PBOX HUMERAL FRACTURE Left 12/11/2023    Procedure: LEFT OPEN REDUCTION INTERNAL FIXATION  HUMERUS, PROXIMAL AND LEFT BICEPS TENODESIS;  Surgeon: Aris Pierre M.D.;  Location: [facility];  Service: Orthopedics    PB REPAIR BICEPS LONG TENDON Left 12/11/2023    Procedure: TENODESIS, BICEPS, OPEN;  Surgeon: Aris Pierre M.D.;  Location: [facility];  Service: Orthopedics    CATARACT EXTRACTION WITH IOL Bilateral 2023    TOE AMPUTATION Left 09/30/2021    Procedure: AMPUTATION, TOE;  Surgeon: Tomer Hart M.D.;  Location: [facility];  Service: Orthopedics    STENT PLACEMENT  2019    STEMI with nonobstructive LAD    TOE AMPUTATION Right 07/02/2018    Procedure: Transmetatarsal amputation;  Surgeon: Ravi Bernardo M.D.;  Location:  SURGERY [facility];  Service: Orthopedics    ACHILLES TENDON REPAIR Right 2018    Procedure: ACHILLES LENGTHENING;  Surgeon: Ravi Bernardo M.D.;  Location: SURGERY [facility];  Service: Orthopedics    IRRIGATION & DEBRIDEMENT ORTHO Right 2018    Procedure: IRRIGATION & DEBRIDEMENT ORTHO;  Surgeon: Ravi Bernardo M.D.;  Location: SURGERY [facility];  Service: Orthopedics    OTHER CARDIAC SURGERY  2019    Stent installed right side    OTHER ORTHOPEDIC SURGERY      SHOULDER SURGERY  2023    Something is not right in my shoulder/arm    TONSILLECTOMY      VASECTOMY       SOCHX:   Social History     Socioeconomic History    Marital status:     Highest education level: Some college, no degree   Tobacco Use    Smoking status: Former     Current packs/day: 0.00     Average packs/day: 0.6 packs/day for 17.0 years (10.0 ttl pk-yrs)     Types: Cigarettes     Start date: 1988     Quit date:      Years since quittin.2     Passive exposure: Never    Smokeless tobacco: Never   Vaping Use    Vaping status: Never Used   Substance and Sexual Activity    Alcohol use: Not Currently     Comment: Quit     Drug use: Not Currently     Types: Oral     Comment: NOT CURRENTLY, HISTORY OF CBD    Sexual activity: Not Currently     Partners: Female     Social Drivers of Health     Financial Resource Strain: Low Risk  (2022)    Overall Financial Resource Strain (CARDIA)     Difficulty of Paying Living Expenses: Not hard at all   Food Insecurity: No Food Insecurity (2022)    Hunger Vital Sign     Worried About Running Out of Food in the Last Year: Never true     Ran Out of Food in the Last Year: Never true   Transportation Needs: No Transportation Needs (2024)    PRAPARE - Transportation     Lack of Transportation (Medical): No     Lack of Transportation (Non-Medical): No   Physical Activity: Insufficiently Active (2022)    Exercise Vital Sign     Days of  Exercise per Week: 2 days     Minutes of Exercise per Session: 40 min   Stress: No Stress Concern Present (12/13/2022)    [employer] [place] [employer] - Occupational Stress Questionnaire     Feeling of Stress : Not at all   Social Connections: Moderately Integrated (12/13/2022)    Social Connection and Isolation Panel [NHANES]     Frequency of Communication with Friends and Family: More than three times a week     Frequency of Social Gatherings with Friends and Family: More than three times a week     Attends [employer] Services: Never     Active Member of Clubs or Organizations: Yes     Attends Club or Organization Meetings: Never     Marital Status:    Housing Stability: Low Risk  (12/13/2022)    Housing Stability Vital Sign     Unable to Pay for Housing in the Last Year: No     Number of Places Lived in the Last Year: 1     Unstable Housing in the Last Year: No     FH:   Family History   Problem Relation Age of Onset    No Known Problems Mother     Diabetes Father         Adult onset    Heart Disease Father     Hypertension Father     Diabetes Maternal Grandmother     Heart Disease Maternal Grandfather     Lung Disease Paternal Grandmother     Cancer Paternal Grandmother     Cancer Paternal Grandfather     Lung Cancer Paternal Grandfather     Kidney Disease Neg Hx          Objective:   BP (!) 190/90   Pulse (!) 106   Temp 37.9 °C (100.2 °F) (Temporal)   Resp 20   Ht 1.829 m (6')   Wt 104 kg (229 lb)   SpO2 98%   BMI 31.06 kg/m²     Physical Exam  Vitals and nursing note reviewed.   Constitutional:       General: He is not in acute distress.     Appearance: Normal appearance. He is normal weight. He is not ill-appearing or toxic-appearing.   HENT:      Head: Normocephalic.      Right Ear: External ear normal.      Left Ear: External ear normal.      Nose: Nose normal.      Mouth/Throat:      Mouth: Mucous membranes are moist.   Eyes:      General:         Right eye: No discharge.          Left eye: No discharge.      Extraocular Movements: Extraocular movements intact.      Conjunctiva/sclera: Conjunctivae normal.      Pupils: Pupils are equal, round, and reactive to light.   Cardiovascular:      Rate and Rhythm: Tachycardia present.   Pulmonary:      Effort: Pulmonary effort is normal.      Breath sounds: Normal breath sounds.   Abdominal:      General: Abdomen is flat.   Musculoskeletal:         General: Normal range of motion.      Cervical back: Normal range of motion and neck supple. No rigidity.   Lymphadenopathy:      Cervical: No cervical adenopathy.   Skin:     General: Skin is warm and dry.      Comments: Open wound present to stump of left lower extremity.  Odor is present.  Positive for bleeding and active drainage.  Skin of left lower extremity is hot to touch.   Neurological:      General: No focal deficit present.      Mental Status: He is alert and oriented to person, place, and time. Mental status is at baseline.   Psychiatric:         Mood and Affect: Mood normal.         Behavior: Behavior normal.         Judgment: Judgment normal.         Assessment/Plan:   Assessment    1. Infection (chronic) of amputation stump ([facility])        2. CKD (chronic kidney disease) requiring chronic dialysis ([facility])        3. Type 2 diabetes mellitus with stage 5 chronic kidney disease not on chronic dialysis, with long-term current use of insulin ([facility])          Concern at this time for early sepsis.  Patient was transferred via ambulance to [facility].  Report given to EMS.

## 2025-03-28 NOTE — ED TRIAGE NOTES
Chief Complaint   Patient presents with    Wound Check     Pt w L BKA. Pt has a mis fitting prosthesis which has rubbed a sore.     Fever     Pt BIBA from [facility] w the above complaint.     BP (!) 192/91   Pulse (!) 105   Temp 37.8 °C (100.1 °F) (Oral)   Resp 16   Ht 1.829 m (6')   Wt 99.8 kg (220 lb)   SpO2 94%   BMI 29.84 kg/m²

## 2025-03-28 NOTE — ED PROVIDER NOTES
ED Provider Note    CHIEF COMPLAINT  Chief Complaint   Patient presents with    Wound Check     Pt w L BKA. Pt has a mis fitting prosthesis which has rubbed a sore.     Fever       EXTERNAL RECORDS REVIEWED  Outpatient Notes urgent treatment center note from earlier today for concerns of left lower extremity stump infection.    HPI/ROS  LIMITATION TO HISTORY     OUTSIDE HISTORIAN(S):      Beni Moore is a 54 y.o. male who presents to the emergency department with chief complaint of stump infection.  Patient reports that he has a poor fitting prosthesis and has had a wound on his left BKA area.  Over the last few days is got progressively more swollen red warm to the touch she is also has been breakdown and purulent drainage from the apex of the wound.  He said fevers at home slight chills minor nausea no vomiting states that he has a history of chronic diarrhea.  Also end-stage dialysis dependent had extra dialysis yesterday for concern for volume overload and still feels short of breath at this time.    PAST MEDICAL HISTORY   has a past medical history of Anesthesia, Anesthesia (12/06/2023), Bowel habit changes, Cataract, Deaf, right, Diabetes ([facility]), Dialysis patient ([facility]) (08/29/2024), Heart burn, Hemorrhagic disorder ([facility]), High cholesterol, History of non-ST elevation myocardial infarction (NSTEMI), heart artery stent (2019), Hyperlipidemia, Hyperparathyroidism due to renal insufficiency ([facility]), Hypertension, Myocardial infarct ([facility]) (2019), Pneumonia, and Renal disorder (12/06/2023).    SURGICAL HISTORY   has a past surgical history that includes other orthopedic surgery; toe amputation (Right, 07/02/2018); achilles tendon repair (Right, 07/02/2018); irrigation & debridement ortho (Right, 07/02/2018); vasectomy; tonsillectomy; stent placement (2019); toe amputation (Left, 09/30/2021); cataract extraction with iol (Bilateral, 2023); open treatment prox humeral fracture (Left, 12/11/2023); repair biceps long  tendon (Left, 2023); toe amputation (Left, 2024); knee amputation below (Left, 2024); shoulder surgery (2023); orif, fracture, humerus, proximal (Left, 2024); other cardiac surgery (2019); av fistula creation (Left, 2024); and av fistula revision (Left, 2025).    FAMILY HISTORY  Family History   Problem Relation Age of Onset    No Known Problems Mother     Diabetes Father         Adult onset    Heart Disease Father     Hypertension Father     Diabetes Maternal Grandmother     Heart Disease Maternal Grandfather     Lung Disease Paternal Grandmother     Cancer Paternal Grandmother     Cancer Paternal Grandfather     Lung Cancer Paternal Grandfather     Kidney Disease Neg Hx        SOCIAL HISTORY  Social History     Tobacco Use    Smoking status: Former     Current packs/day: 0.00     Average packs/day: 0.6 packs/day for 17.0 years (10.0 ttl pk-yrs)     Types: Cigarettes     Start date: 1988     Quit date:      Years since quittin.2     Passive exposure: Never    Smokeless tobacco: Never   Vaping Use    Vaping status: Never Used   Substance and Sexual Activity    Alcohol use: Not Currently     Comment: Quit     Drug use: Not Currently     Types: Oral     Comment: NOT CURRENTLY, HISTORY OF CBD    Sexual activity: Not Currently     Partners: Female       CURRENT MEDICATIONS  Home Medications       Reviewed by Milena Rajput R.N. (Registered Nurse) on 25 at 1550  Med List Status: Partial     Medication Last Dose Status   aspirin 81 MG EC tablet  Active   atorvastatin (LIPITOR) 80 MG tablet  Active   calcium acetate (PHOS-LO) 667 MG Cap  Active   Calcium Carbonate Antacid 1000 MG Chew Tab  Active   cholestyramine (QUESTRAN) 4 g packet  Active   HUMULIN R 100 UNIT/ML Solution  Active   Insulin Degludec (TRESIBA) 100 UNIT/ML Solution  Active   losartan (COZAAR) 100 MG Tab  Active   metOLazone (ZAROXOLYN) 5 MG Tab  Active   omeprazole (PRILOSEC) 20 MG  delayed-release capsule  Active   torsemide (DEMADEX) 100 MG Tab  Active                  Audit from Redirected Encounters    **Home medications have not yet been reviewed for this encounter**         ALLERGIES  No Known Allergies    PHYSICAL EXAM  VITAL SIGNS: BP (!) 192/91   Pulse (!) 105   Temp 37.8 °C (100.1 °F) (Oral)   Resp 16   Ht 1.829 m (6')   Wt 99.8 kg (220 lb)   SpO2 94%   BMI 29.84 kg/m²      Pulse ox interpretation: I interpret this pulse ox as normal.  Constitutional: Alert and oriented x 3, moderate distress  HEENT: Atraumatic normocephalic, pupils are equal round reactive to light extraocular movements are intact. The nares is clear, external ears are normal, mouth shows moist mucous membranes normal dentition for age  Neck: Supple, no JVD no tracheal deviation  Cardiovascular: Regular rate and rhythm no murmur rub or gallop 2+ pulses peripherally x4  Thorax & Lungs: No respiratory distress, no wheezes rales or rhonchi, No chest tenderness.   GI: Soft nontender nondistended positive bowel sounds, no peritoneal signs  Skin/MSK.  Left lower extremity has BKA.  Just proximal to the lateral aspect of the stump patient has significant erythema induration warmth skin breakdown over the apex and minimal purulent drainage.,  All other extremities unremarkable at this time  Neurologic: Cranial nerves III through XII are grossly intact no sensory deficit no cerebellar dysfunction   Psychiatric: Appropriate affect for situation at this time      EKG/LABS  Results for orders placed or performed during the hospital encounter of 03/28/25   Lactic Acid    Collection Time: 03/28/25  4:07 PM   Result Value Ref Range    Lactic Acid 1.5 0.5 - 2.0 mmol/L   CBC with Differential    Collection Time: 03/28/25  4:07 PM   Result Value Ref Range    WBC 12.5 (H) 4.8 - 10.8 K/uL    RBC 2.42 (L) 4.70 - 6.10 M/uL    Hemoglobin 7.4 (L) 14.0 - 18.0 g/dL    Hematocrit 22.9 (L) 42.0 - 52.0 %    MCV 94.6 81.4 - 97.8 fL    MCH  30.6 27.0 - 33.0 pg    MCHC 32.3 32.3 - 36.5 g/dL    RDW 43.4 35.9 - 50.0 fL    Platelet Count 150 (L) 164 - 446 K/uL    MPV 10.9 9.0 - 12.9 fL    Neutrophils-Polys 86.80 (H) 44.00 - 72.00 %    Lymphocytes 5.20 (L) 22.00 - 41.00 %    Monocytes 6.60 0.00 - 13.40 %    Eosinophils 0.20 0.00 - 6.90 %    Basophils 0.40 0.00 - 1.80 %    Immature Granulocytes 0.80 0.00 - 0.90 %    Nucleated RBC 0.00 0.00 - 0.20 /100 WBC    Neutrophils (Absolute) 10.89 (H) 1.82 - 7.42 K/uL    Lymphs (Absolute) 0.65 (L) 1.00 - 4.80 K/uL    Monos (Absolute) 0.83 0.00 - 0.85 K/uL    Eos (Absolute) 0.02 0.00 - 0.51 K/uL    Baso (Absolute) 0.05 0.00 - 0.12 K/uL    Immature Granulocytes (abs) 0.10 0.00 - 0.11 K/uL    NRBC (Absolute) 0.00 K/uL   Complete Metabolic Panel    Collection Time: 03/28/25  4:07 PM   Result Value Ref Range    Sodium 132 (L) 135 - 145 mmol/L    Potassium 4.3 3.6 - 5.5 mmol/L    Chloride 94 (L) 96 - 112 mmol/L    Co2 23 20 - 33 mmol/L    Anion Gap 15.0 7.0 - 16.0    Glucose 179 (H) 65 - 99 mg/dL    Bun 51 (H) 8 - 22 mg/dL    Creatinine 6.48 (HH) 0.50 - 1.40 mg/dL    Calcium 8.3 (L) 8.5 - 10.5 mg/dL    Correct Calcium 8.5 8.5 - 10.5 mg/dL    AST(SGOT) 19 12 - 45 U/L    ALT(SGPT) 9 2 - 50 U/L    Alkaline Phosphatase 58 30 - 99 U/L    Total Bilirubin 1.2 0.1 - 1.5 mg/dL    Albumin 3.8 3.2 - 4.9 g/dL    Total Protein 7.0 6.0 - 8.2 g/dL    Globulin 3.2 1.9 - 3.5 g/dL    A-G Ratio 1.2 g/dL   ESTIMATED GFR    Collection Time: 03/28/25  4:07 PM   Result Value Ref Range    GFR (CKD-EPI) 9 (A) >60 mL/min/1.73 m 2     *Note: Due to a large number of results and/or encounters for the requested time period, some results have not been displayed. A complete set of results can be found in Results Review.      I have independently interpreted this EKG    RADIOLOGY/PROCEDURES     Radiologist interpretation:  DX-CHEST-PORTABLE (1 VIEW)   Final Result      1.  Right lower lobe atelectasis and/or pneumonitis..      CT-LOWER EXTREMITY UNILATERAL  WITH CONTRAST LEFT    (Results Pending)       COURSE & MEDICAL DECISION MAKING    ASSESSMENT, COURSE AND PLAN  Care Narrative: SIRS at arrival with sepsis protocol initiated given broad-spectrum antibiotics including vancomycin and Unasyn.  Doing much better here however continues to have intermittent chills continues to have bouts with profound hypertension.  CT scan of the left lower extremities been completed pending radiology review however I do not see any abscess amenable to drainage at this time.  I discussed case with the resident housestaff the patient will be admitted to the internal medicine team for ongoing evaluation and treatment admitted in guarded condition.      FINAL DIAGNOSIS  1. SIRS (systemic inflammatory response syndrome) (HCC) Active   2. Cellulitis of left lower extremity Active   3. Hypertensive urgency Active   4.  Acute on chronic renal failure     Electronically signed by: Troy Mata M.D.,

## 2025-03-29 ENCOUNTER — APPOINTMENT (OUTPATIENT)
Dept: RADIOLOGY | Facility: MEDICAL CENTER | Age: 55
DRG: 564 | End: 2025-03-29
Payer: MEDICARE

## 2025-03-29 PROBLEM — L03.116 CELLULITIS OF LEFT THIGH: Status: ACTIVE | Noted: 2025-03-29

## 2025-03-29 PROBLEM — R78.81 STREPTOCOCCAL BACTEREMIA: Status: ACTIVE | Noted: 2025-03-29

## 2025-03-29 PROBLEM — B95.5 STREPTOCOCCAL BACTEREMIA: Status: ACTIVE | Noted: 2025-03-29

## 2025-03-29 LAB
ALBUMIN SERPL BCP-MCNC: 3.1 G/DL (ref 3.2–4.9)
ALBUMIN/GLOB SERPL: 1 G/DL
ALP SERPL-CCNC: 47 U/L (ref 30–99)
ALT SERPL-CCNC: <5 U/L (ref 2–50)
ANION GAP SERPL CALC-SCNC: 14 MMOL/L (ref 7–16)
AST SERPL-CCNC: 14 U/L (ref 12–45)
BASOPHILS # BLD AUTO: 0.6 % (ref 0–1.8)
BASOPHILS # BLD: 0.05 K/UL (ref 0–0.12)
BILIRUB SERPL-MCNC: 1.6 MG/DL (ref 0.1–1.5)
BUN SERPL-MCNC: 57 MG/DL (ref 8–22)
CALCIUM ALBUM COR SERPL-MCNC: 8.4 MG/DL (ref 8.5–10.5)
CALCIUM SERPL-MCNC: 7.7 MG/DL (ref 8.5–10.5)
CHLORIDE SERPL-SCNC: 96 MMOL/L (ref 96–112)
CO2 SERPL-SCNC: 22 MMOL/L (ref 20–33)
CREAT SERPL-MCNC: 7.76 MG/DL (ref 0.5–1.4)
EOSINOPHIL # BLD AUTO: 0.04 K/UL (ref 0–0.51)
EOSINOPHIL NFR BLD: 0.5 % (ref 0–6.9)
ERYTHROCYTE [DISTWIDTH] IN BLOOD BY AUTOMATED COUNT: 47.3 FL (ref 35.9–50)
ERYTHROCYTE [DISTWIDTH] IN BLOOD BY AUTOMATED COUNT: 47.8 FL (ref 35.9–50)
GFR SERPLBLD CREATININE-BSD FMLA CKD-EPI: 8 ML/MIN/1.73 M 2
GLOBULIN SER CALC-MCNC: 3 G/DL (ref 1.9–3.5)
GLUCOSE BLD STRIP.AUTO-MCNC: 121 MG/DL (ref 65–99)
GLUCOSE BLD STRIP.AUTO-MCNC: 81 MG/DL (ref 65–99)
GLUCOSE BLD STRIP.AUTO-MCNC: 97 MG/DL (ref 65–99)
GLUCOSE BLD STRIP.AUTO-MCNC: 99 MG/DL (ref 65–99)
GLUCOSE SERPL-MCNC: 92 MG/DL (ref 65–99)
GRAM STN SPEC: NORMAL
HAV IGM SERPL QL IA: NORMAL
HBV CORE IGM SER QL: NORMAL
HBV SURFACE AB SERPL IA-ACNC: <3.5 MIU/ML (ref 0–10)
HBV SURFACE AG SER QL: NORMAL
HCT VFR BLD AUTO: 23.3 % (ref 42–52)
HCT VFR BLD AUTO: 26.4 % (ref 42–52)
HCV AB SER QL: NORMAL
HGB BLD-MCNC: 7.3 G/DL (ref 14–18)
HGB BLD-MCNC: 8.5 G/DL (ref 14–18)
IMM GRANULOCYTES # BLD AUTO: 0.06 K/UL (ref 0–0.11)
IMM GRANULOCYTES NFR BLD AUTO: 0.7 % (ref 0–0.9)
LYMPHOCYTES # BLD AUTO: 1.25 K/UL (ref 1–4.8)
LYMPHOCYTES NFR BLD: 14.6 % (ref 22–41)
MCH RBC QN AUTO: 30.2 PG (ref 27–33)
MCH RBC QN AUTO: 30.2 PG (ref 27–33)
MCHC RBC AUTO-ENTMCNC: 31.3 G/DL (ref 32.3–36.5)
MCHC RBC AUTO-ENTMCNC: 32.2 G/DL (ref 32.3–36.5)
MCV RBC AUTO: 94 FL (ref 81.4–97.8)
MCV RBC AUTO: 96.3 FL (ref 81.4–97.8)
MONOCYTES # BLD AUTO: 0.86 K/UL (ref 0–0.85)
MONOCYTES NFR BLD AUTO: 10 % (ref 0–13.4)
NEUTROPHILS # BLD AUTO: 6.32 K/UL (ref 1.82–7.42)
NEUTROPHILS NFR BLD: 73.6 % (ref 44–72)
NRBC # BLD AUTO: 0 K/UL
NRBC BLD-RTO: 0 /100 WBC (ref 0–0.2)
PLATELET # BLD AUTO: 118 K/UL (ref 164–446)
PLATELET # BLD AUTO: 121 K/UL (ref 164–446)
PMV BLD AUTO: 10.1 FL (ref 9–12.9)
PMV BLD AUTO: 10.5 FL (ref 9–12.9)
POTASSIUM SERPL-SCNC: 4.3 MMOL/L (ref 3.6–5.5)
PROT SERPL-MCNC: 6.1 G/DL (ref 6–8.2)
RBC # BLD AUTO: 2.42 M/UL (ref 4.7–6.1)
RBC # BLD AUTO: 2.81 M/UL (ref 4.7–6.1)
SCCMEC + MECA PNL NOSE NAA+PROBE: NEGATIVE
SIGNIFICANT IND 70042: NORMAL
SITE SITE: NORMAL
SODIUM SERPL-SCNC: 132 MMOL/L (ref 135–145)
SOURCE SOURCE: NORMAL
WBC # BLD AUTO: 6.9 K/UL (ref 4.8–10.8)
WBC # BLD AUTO: 8.6 K/UL (ref 4.8–10.8)

## 2025-03-29 PROCEDURE — 97166 OT EVAL MOD COMPLEX 45 MIN: CPT

## 2025-03-29 PROCEDURE — A9270 NON-COVERED ITEM OR SERVICE: HCPCS

## 2025-03-29 PROCEDURE — 36430 TRANSFUSION BLD/BLD COMPNT: CPT

## 2025-03-29 PROCEDURE — 80053 COMPREHEN METABOLIC PANEL: CPT

## 2025-03-29 PROCEDURE — 85025 COMPLETE CBC W/AUTO DIFF WBC: CPT

## 2025-03-29 PROCEDURE — 700102 HCHG RX REV CODE 250 W/ 637 OVERRIDE(OP)

## 2025-03-29 PROCEDURE — 30233N1 TRANSFUSION OF NONAUTOLOGOUS RED BLOOD CELLS INTO PERIPHERAL VEIN, PERCUTANEOUS APPROACH: ICD-10-PCS | Performed by: INTERNAL MEDICINE

## 2025-03-29 PROCEDURE — 700105 HCHG RX REV CODE 258

## 2025-03-29 PROCEDURE — 90935 HEMODIALYSIS ONE EVALUATION: CPT

## 2025-03-29 PROCEDURE — 99233 SBSQ HOSP IP/OBS HIGH 50: CPT | Mod: GC | Performed by: INTERNAL MEDICINE

## 2025-03-29 PROCEDURE — 97597 DBRDMT OPN WND 1ST 20 CM/<: CPT

## 2025-03-29 PROCEDURE — 770020 HCHG ROOM/CARE - TELE (206)

## 2025-03-29 PROCEDURE — 99223 1ST HOSP IP/OBS HIGH 75: CPT | Performed by: INTERNAL MEDICINE

## 2025-03-29 PROCEDURE — 86923 COMPATIBILITY TEST ELECTRIC: CPT | Mod: 91

## 2025-03-29 PROCEDURE — 700111 HCHG RX REV CODE 636 W/ 250 OVERRIDE (IP)

## 2025-03-29 PROCEDURE — 80074 ACUTE HEPATITIS PANEL: CPT

## 2025-03-29 PROCEDURE — 97162 PT EVAL MOD COMPLEX 30 MIN: CPT

## 2025-03-29 PROCEDURE — 82962 GLUCOSE BLOOD TEST: CPT | Mod: 91

## 2025-03-29 PROCEDURE — 36415 COLL VENOUS BLD VENIPUNCTURE: CPT

## 2025-03-29 PROCEDURE — 87205 SMEAR GRAM STAIN: CPT

## 2025-03-29 PROCEDURE — 87077 CULTURE AEROBIC IDENTIFY: CPT

## 2025-03-29 PROCEDURE — 86706 HEP B SURFACE ANTIBODY: CPT

## 2025-03-29 PROCEDURE — P9016 RBC LEUKOCYTES REDUCED: HCPCS

## 2025-03-29 PROCEDURE — 700111 HCHG RX REV CODE 636 W/ 250 OVERRIDE (IP): Performed by: INTERNAL MEDICINE

## 2025-03-29 PROCEDURE — 85027 COMPLETE CBC AUTOMATED: CPT

## 2025-03-29 PROCEDURE — 87070 CULTURE OTHR SPECIMN AEROBIC: CPT

## 2025-03-29 RX ORDER — ASPIRIN 81 MG/1
81 TABLET ORAL DAILY
Status: DISCONTINUED | OUTPATIENT
Start: 2025-03-29 | End: 2025-04-02 | Stop reason: HOSPADM

## 2025-03-29 RX ORDER — HEPARIN SODIUM 1000 [USP'U]/ML
1800 INJECTION, SOLUTION INTRAVENOUS; SUBCUTANEOUS
Status: DISCONTINUED | OUTPATIENT
Start: 2025-03-29 | End: 2025-04-02 | Stop reason: HOSPADM

## 2025-03-29 RX ORDER — SODIUM CHLORIDE 9 MG/ML
100 INJECTION, SOLUTION INTRAVENOUS
Status: DISCONTINUED | OUTPATIENT
Start: 2025-03-29 | End: 2025-04-02 | Stop reason: HOSPADM

## 2025-03-29 RX ADMIN — CHOLESTYRAMINE 4 G: 4 POWDER, FOR SUSPENSION ORAL at 18:05

## 2025-03-29 RX ADMIN — OMEPRAZOLE 20 MG: 20 CAPSULE, DELAYED RELEASE ORAL at 05:03

## 2025-03-29 RX ADMIN — METOLAZONE 5 MG: 5 TABLET ORAL at 05:03

## 2025-03-29 RX ADMIN — ASPIRIN 81 MG: 81 TABLET, COATED ORAL at 08:21

## 2025-03-29 RX ADMIN — OXYCODONE HYDROCHLORIDE 5 MG: 5 TABLET ORAL at 15:26

## 2025-03-29 RX ADMIN — AMPICILLIN SODIUM 2000 MG: 2 INJECTION, POWDER, FOR SOLUTION INTRAVENOUS at 17:32

## 2025-03-29 RX ADMIN — CHOLESTYRAMINE 4 G: 4 POWDER, FOR SUSPENSION ORAL at 10:13

## 2025-03-29 RX ADMIN — HEPARIN SODIUM 1800 UNITS: 1000 INJECTION, SOLUTION INTRAVENOUS; SUBCUTANEOUS at 13:17

## 2025-03-29 RX ADMIN — HYDROMORPHONE HYDROCHLORIDE 0.25 MG: 1 INJECTION, SOLUTION INTRAMUSCULAR; INTRAVENOUS; SUBCUTANEOUS at 02:05

## 2025-03-29 RX ADMIN — OXYCODONE HYDROCHLORIDE 5 MG: 5 TABLET ORAL at 05:02

## 2025-03-29 RX ADMIN — HEPARIN SODIUM 5000 UNITS: 5000 INJECTION, SOLUTION INTRAVENOUS; SUBCUTANEOUS at 05:03

## 2025-03-29 RX ADMIN — LOSARTAN POTASSIUM 100 MG: 50 TABLET, FILM COATED ORAL at 05:03

## 2025-03-29 RX ADMIN — ATORVASTATIN CALCIUM 80 MG: 80 TABLET, FILM COATED ORAL at 17:28

## 2025-03-29 RX ADMIN — TORSEMIDE 100 MG: 100 TABLET ORAL at 05:03

## 2025-03-29 RX ADMIN — ACETAMINOPHEN 1000 MG: 500 TABLET ORAL at 03:21

## 2025-03-29 RX ADMIN — HYDROMORPHONE HYDROCHLORIDE 0.25 MG: 1 INJECTION, SOLUTION INTRAMUSCULAR; INTRAVENOUS; SUBCUTANEOUS at 19:42

## 2025-03-29 RX ADMIN — OXYCODONE HYDROCHLORIDE 5 MG: 5 TABLET ORAL at 23:30

## 2025-03-29 ASSESSMENT — ENCOUNTER SYMPTOMS
COUGH: 1
SHORTNESS OF BREATH: 0
PSYCHIATRIC NEGATIVE: 1
DIZZINESS: 0
CARDIOVASCULAR NEGATIVE: 1
EYES NEGATIVE: 1
WHEEZING: 0
FEVER: 1
HEADACHES: 0
HEARTBURN: 1
NEUROLOGICAL NEGATIVE: 1
RESPIRATORY NEGATIVE: 1
HEMOPTYSIS: 0
SPUTUM PRODUCTION: 0
PALPITATIONS: 0
CONSTITUTIONAL NEGATIVE: 1
CONSTIPATION: 0
CHILLS: 1
ABDOMINAL PAIN: 0
DIARRHEA: 0
MYALGIAS: 0
GASTROINTESTINAL NEGATIVE: 1
MYALGIAS: 1

## 2025-03-29 ASSESSMENT — FIBROSIS 4 INDEX: FIB4 SCORE: 2.36

## 2025-03-29 ASSESSMENT — COGNITIVE AND FUNCTIONAL STATUS - GENERAL
MOBILITY SCORE: 19
SUGGESTED CMS G CODE MODIFIER DAILY ACTIVITY: CJ
HELP NEEDED FOR BATHING: A LITTLE
DRESSING REGULAR LOWER BODY CLOTHING: A LITTLE
WALKING IN HOSPITAL ROOM: A LOT
TOILETING: A LITTLE
DRESSING REGULAR UPPER BODY CLOTHING: A LITTLE
DAILY ACTIVITIY SCORE: 20
SUGGESTED CMS G CODE MODIFIER MOBILITY: CK
CLIMB 3 TO 5 STEPS WITH RAILING: TOTAL

## 2025-03-29 ASSESSMENT — PATIENT HEALTH QUESTIONNAIRE - PHQ9
1. LITTLE INTEREST OR PLEASURE IN DOING THINGS: NOT AT ALL
2. FEELING DOWN, DEPRESSED, IRRITABLE, OR HOPELESS: NOT AT ALL
SUM OF ALL RESPONSES TO PHQ9 QUESTIONS 1 AND 2: 0

## 2025-03-29 ASSESSMENT — PAIN DESCRIPTION - PAIN TYPE
TYPE: ACUTE PAIN

## 2025-03-29 ASSESSMENT — GAIT ASSESSMENTS: GAIT LEVEL OF ASSIST: UNABLE TO PARTICIPATE

## 2025-03-29 ASSESSMENT — ACTIVITIES OF DAILY LIVING (ADL): TOILETING: INDEPENDENT

## 2025-03-29 NOTE — ASSESSMENT & PLAN NOTE
This is Sepsis Present on admission  SIRS criteria identified on my evaluation include: Tachycardia, with heart rate greater than 90 BPM and Leukocytosis, with WBC greater than 12,000  Clinical indicators of end organ dysfunction include Acute Respiratory Failure - (mechanical ventilation or BiPap or PaO2/FiO2 ratio < 300)  Source is Skin  Sepsis protocol initiated  Crystalloid Fluid Administration: restricted d/t ESRD on HD  IV antibiotics as appropriate for source of sepsis  Reassessment: I have reassessed the patient's hemodynamic status  Prior staph cellulitis.  - Switch to Ampicillin, see bacteremia plan  - Telemetry monitoring.   - MRSA nares,negative  - MAP goal > 65 mm Hg.

## 2025-03-29 NOTE — ASSESSMENT & PLAN NOTE
Reportedly dialysis Mon and Wed only.   Had extra dialysis session yesterday (03/27/25) per Nephrology's recommendation d/t volume overload.   No hyperkalemia.  No evident fluid overload.   -HD today, Dr. Plascencia consulted

## 2025-03-29 NOTE — THERAPY
"Physical Therapy   Initial Evaluation     Patient Name: Beni Moore  Age:  54 y.o., Sex:  male  Medical Record #: [id number]  Today's Date: 3/29/2025     Precautions  Precautions: Fall Risk  Comments: L BKA    Assessment  Patient is a 54 y.o. male who was admitted with a LLE wound related to his prosthetic and diagnosed with sepsis. PMH significant for diabetes, ESRD on HD, CAD, HTN, R TMA, L BKA.    Pt received in bed and agreeable to PT evaluation. Pt was able to demonstrate bed mobility and multiple stand pivot transfers with supervision and no losses of balance. Pt provided with education on not wearing his prosthetic until the wound has healed and pt reports he will use his wheelchair at home during that time. Pt also reported he does LE exercises that were given to him at [facility] last year following his BKA to maintain strength. Anticipate no further PT needs.    Plan    Physical Therapy Initial Treatment Plan   Duration: Evaluation only    DC Equipment Recommendations: None  Discharge Recommendations: Anticipate that the patient will have no further physical therapy needs after discharge from the hospital       Subjective    \"I don't have a walker. I'll use the wheelchair while I can't use my prosthetic.\"     Objective       03/29/25 0843   Precautions   Precautions Fall Risk   Comments L BKA   Vitals   O2 Delivery Device None - Room Air   Pain 0 - 10 Group   Therapist Pain Assessment Post Activity Pain Same as Prior to Activity;Nurse Notified;2   Prior Living Situation   Housing / Facility [address] [facility]   Steps Into Home 0   Steps In Home 0   Equipment Owned Wheelchair   Lives with - Patient's Self Care Capacity Alone and Able to Care For Self   Comments Pt reports he is not able to successfully hop using a FWW with 1 leg, so he will use a wheelchair while not allowed to wear his prosthetic.   Prior Level of Functional Mobility   Bed Mobility Independent   Transfer Status Independent   Ambulation " Independent   Ambulation Distance community distances   Assistive Devices Used None   Wheelchair Independent   History of Falls   History of Falls No   Cognition    Level of Consciousness Alert   Comments Very pleasant and cooperative   Passive ROM Lower Body   Passive ROM Lower Body WDL   Active ROM Lower Body    Active ROM Lower Body  WDL   Strength Lower Body   Comments BLE grossly 4 to 5/5, L BKA   Sensation Lower Body   Comments Denies any acute sensory changes   Lower Body Muscle Tone   Lower Body Muscle Tone  WDL   Coordination Lower Body    Coordination Lower Body  WDL   Balance Assessment   Sitting Balance (Static) Good   Sitting Balance (Dynamic) Good   Standing Balance (Static) Fair +   Standing Balance (Dynamic) Fair   Weight Shift Sitting Good   Weight Shift Standing Good   Comments transfer with no AD   Bed Mobility    Supine to Sit Supervised   Sit to Supine Supervised   Scooting Supervised   Comments HOB elevated   Gait Analysis   Gait Level Of Assist Unable to Participate   Functional Mobility   Sit to Stand Supervised   Bed, Chair, Wheelchair Transfer Supervised   Transfer Method Stand Pivot   Mobility bed<>chair   Comments Pt's wheelchair not present, but able to demo where he would place it and transfer to and from a chair.   6 Clicks Assessment - How much HELP from from another person do you currently need... (If the patient hasn't done an activity recently, how much help from another person do you think he/she would need if he/she tried?)   Turning from your back to your side while in a flat bed without using bedrails? 4   Moving from lying on your back to sitting on the side of a flat bed without using bedrails? 4   Moving to and from a bed to a chair (including a wheelchair)? 4   Standing up from a chair using your arms (e.g., wheelchair, or bedside chair)? 4   Walking in hospital room? 2   Climbing 3-5 steps with a railing? 1   6 clicks Mobility Score 19   Education Group   Education  Provided Role of Physical Therapist   Role of Physical Therapist Patient Response Patient;Acceptance;Explanation;Verbal Demonstration   Physical Therapy Initial Treatment Plan    Duration Evaluation only   Anticipated Discharge Equipment and Recommendations   DC Equipment Recommendations None   Discharge Recommendations Anticipate that the patient will have no further physical therapy needs after discharge from the hospital   Interdisciplinary Plan of Care Collaboration   IDT Collaboration with  Nursing;Occupational Therapist   Patient Position at End of Therapy In Bed;Call Light within Reach;Tray Table within Reach;Phone within Reach   Collaboration Comments RN updated   Session Information   Date / Session Number  3/29-eval only     Patient seen for team evaluation with Occupational Therapist for the following reason(s):  Therapy sessions needed to be done by a certain time period for both disciplines, and did not impede patient's progress. Pt pending dialysis immediately following therapy evaluations.

## 2025-03-29 NOTE — ASSESSMENT & PLAN NOTE
Chronic, intermittent diarrhea.  Per patient, on prescribed cholestyramine to control diarrhea.  - Resume cholestyramine inpatient.

## 2025-03-29 NOTE — ASSESSMENT & PLAN NOTE
Left, lateral thigh open wound with serosanguineous drainage.  Surrounding erythema.  Associated sepsis.   -Switch abx to Ampicillin 2g Q4  - Wound care consult, placed.

## 2025-03-29 NOTE — PROGRESS NOTES
TELEMETRY STRIP SUMMARY:    Rhythm: SR  HR: 90-98 bpm  Ectopy: rPVC    NJ: 0.14  QRS: 0.09  QT: 0.32

## 2025-03-29 NOTE — H&P
[facility] Internal Medicine History & Physical Note    Date of Service  3/28/2025    Attending: Augie Herndon M.d.  Senior Resident: Dr. Newton Watson  Contact Number: [phone]    Primary Care Physician  Florinda Pascual M.D.    Code Status  Full Code    Chief Complaint  Chief Complaint   Patient presents with    Wound Check     Pt w L BKA. Pt has a mis fitting prosthesis which has rubbed a sore.     Fever       History of Presenting Illness (HPI):   Beni Moore is a 54 y.o. male with PMH of IDDM w/diabetic enteropathy-related diarrhea, ESRD on ED, Hypertension, Dyslipidemia, s/p left BKA, s/p right transmetatarsal amputation, CAD s/p stent.     Presents 03/28/2025 due to 1 week of progressively worsening left thigh pain secondary to an open, nonhealing wound with serosanguineous drainage  in the lateral aspect of his thigh.  As of this progress, he has been experiencing generalized malaise, fevers, chills.  He suspects of this wound appeared due to constant friction of his prosthetic leg secondary to constant adjustments due to persistent increase and decrease in size of his legs d/t volume overload.     I discussed the plan of care with patient and Dr. Herndon .    Review of Systems  Review of Systems   Constitutional:  Positive for chills, fever and malaise/fatigue.   Respiratory:  Positive for cough. Negative for hemoptysis, sputum production, shortness of breath and wheezing.    Cardiovascular:  Negative for chest pain and palpitations.   Gastrointestinal:  Positive for heartburn. Negative for abdominal pain, constipation and diarrhea.   Musculoskeletal:  Negative for myalgias.   Skin:         Left, lateral thigh open wound with serosanguineous drainage.   Neurological:  Negative for dizziness and headaches.       Past Medical History   has a past medical history of Anesthesia, Anesthesia (12/06/2023), Bowel habit changes, Cataract, Deaf, right, Diabetes (HCC), Dialysis patient ([facility]) (08/29/2024), Heart burn,  Hemorrhagic disorder (HCC), High cholesterol, History of non-ST elevation myocardial infarction (NSTEMI), heart artery stent (2019), Hyperlipidemia, Hyperparathyroidism due to renal insufficiency (HCC), Hypertension, Myocardial infarct (HCC) (2019), Pneumonia, and Renal disorder (12/06/2023).    Surgical History   has a past surgical history that includes other orthopedic surgery; toe amputation (Right, 07/02/2018); achilles tendon repair (Right, 07/02/2018); irrigation & debridement ortho (Right, 07/02/2018); vasectomy; tonsillectomy; stent placement (2019); toe amputation (Left, 09/30/2021); cataract extraction with iol (Bilateral, 2023); pr open treatment prox humeral fracture (Left, 12/11/2023); pr repair biceps long tendon (Left, 12/11/2023); toe amputation (Left, 01/30/2024); knee amputation below (Left, 02/01/2024); shoulder surgery (12/11/2023); orif, fracture, humerus, proximal (Left, 08/30/2024); other cardiac surgery (09/19/2019); av fistula creation (Left, 11/04/2024); and av fistula revision (Left, 1/6/2025).     Family History  family history includes Cancer in his paternal grandfather and paternal grandmother; Diabetes in his father and maternal grandmother; Heart Disease in his father and maternal grandfather; Hypertension in his father; Lung Cancer in his paternal grandfather; Lung Disease in his paternal grandmother; No Known Problems in his mother.   Family history reviewed with patient.     Social History  Tobacco: Denies. Stopped 15 years ago.   Alcohol: Denies. Stopped 15 years ago.   Recreational drugs (illegal or prescription): Denies  Employment:   Living Situation: Home, lives alone.  Recent Travel: Denies  Primary Care Provider:   Other (stressors, spirituality, exposures): Denies    Allergies  No Known Allergies    Medications  Prior to Admission Medications   Prescriptions Last Dose Informant Patient Reported? Taking?   HUMULIN R 100 UNIT/ML Solution Unknown Patient Yes No   Sig: Inject  4-8 Units under the skin 3 times a day before meals. Sliding Scale   150 - 200 = 3 units  201 - 250 = 4 units  251 - 300 = 5 units  301 - 350 = 6 units  351 - 400 = 7 units   PLUS CARB CORRECTION  of 1 unit for every 15 g carb   Insulin Degludec (TRESIBA) 100 UNIT/ML Solution Unknown Patient Yes No   Sig: Inject 24 Units under the skin at bedtime.      aspirin 81 MG EC tablet 3/28/2025 Morning Patient No Yes   Sig: Take 1 Tablet by mouth every day.   atorvastatin (LIPITOR) 80 MG tablet 3/27/2025 Bedtime Patient No Yes   Sig: Take 1 Tablet by mouth every evening.   Patient taking differently: Take 80 mg by mouth every evening.      calcium acetate (PHOS-LO) 667 MG Cap 3/28/2025 Morning Patient Yes Yes   Sig: Take 1,334 mg by mouth 3 times a day with meals. 2 capsules= 1334mg   cholestyramine (QUESTRAN) 4 g packet 3/28/2025 at 10:00 AM Patient No Yes   Sig: Take 4 g by mouth 2 times a day for 360 days.   diphenhydrAMINE-APAP, sleep, (TYLENOL PM EXTRA STRENGTH)  MG Tab 3/27/2025 Bedtime Patient Yes Yes   Sig: Take 3-4 Tablets by mouth at bedtime as needed (pain/ sleep).   losartan (COZAAR) 100 MG Tab 3/28/2025 Morning Patient No Yes   Sig: Take 1 Tablet by mouth every day.   metOLazone (ZAROXOLYN) 5 MG Tab 3/28/2025 Morning Patient No Yes   Sig: Take 1 Tablet by mouth every day.   omeprazole (PRILOSEC) 20 MG delayed-release capsule 3/28/2025 Morning Patient Yes Yes   Sig: Take 20 mg by mouth every morning.      torsemide (DEMADEX) 100 MG Tab 3/28/2025 Morning Patient No Yes   Sig: Take 1 Tablet by mouth every day.      Facility-Administered Medications: None       Physical Exam  Temp:  [37.5 °C (99.5 °F)-37.9 °C (100.2 °F)] 37.5 °C (99.5 °F)  Pulse:  [] 98  Resp:  [15-20] 20  BP: (144-241)/() 173/70  SpO2:  [86 %-100 %] 94 %  Blood Pressure: (!) 199/85   Temperature: 37.8 °C (100.1 °F)   Pulse: 100   Respiration: 19   Pulse Oximetry: 95 %       Physical Exam  Constitutional:       General: He is not  "in acute distress.     Appearance: He is obese. He is not ill-appearing.   Cardiovascular:      Rate and Rhythm: Regular rhythm. Tachycardia present.      Pulses: Normal pulses.      Heart sounds: Normal heart sounds.   Pulmonary:      Effort: Pulmonary effort is normal. No respiratory distress.      Breath sounds: Normal breath sounds.   Chest:      Chest wall: No tenderness.   Abdominal:      General: Bowel sounds are normal. There is no distension.      Palpations: Abdomen is soft.      Tenderness: There is no abdominal tenderness.   Musculoskeletal:      Right lower leg: No edema.      Left lower leg: No edema.      Comments: S/p left BKA and right transmetatarsal amputation   Skin:     Comments: Left, lateral thigh open wound with serosanguineous drainage and surrounding erythema.           Laboratory:  Recent Labs     03/28/25  1607   WBC 12.5*   RBC 2.42*   HEMOGLOBIN 7.4*   HEMATOCRIT 22.9*   MCV 94.6   MCH 30.6   MCHC 32.3   RDW 43.4   PLATELETCT 150*   MPV 10.9     Recent Labs     03/28/25  1607   SODIUM 132*   POTASSIUM 4.3   CHLORIDE 94*   CO2 23   GLUCOSE 179*   BUN 51*   CREATININE 6.48*   CALCIUM 8.3*     Recent Labs     03/28/25  1607   ALTSGPT 9   ASTSGOT 19   ALKPHOSPHAT 58   TBILIRUBIN 1.2   GLUCOSE 179*     Recent Labs     03/28/25  1607   INR 1.20*     No results for input(s): \"NTPROBNP\" in the last 72 hours.      No results for input(s): \"TROPONINT\" in the last 72 hours.    Imaging:  CT-LOWER EXTREMITY UNILATERAL WITH CONTRAST LEFT   Final Result         1. Subcutaneous edema. This may be seen with cellulitis.   2. No drainable abscess is evident.   3. Below the knee amputation without evidence for osteomyelitis.      DX-CHEST-PORTABLE (1 VIEW)   Final Result      1.  Right lower lobe atelectasis and/or pneumonitis..          X-Ray:  I have personally reviewed the images and compared with prior images.    Assessment/Plan:  Problem Representation:   54 y.o. male with PMH of IDDM w/diabetic " enteropathy-related diarrhea, ESRD on ED, Hypertension, Dyslipidemia, s/p left BKA, s/p right transmetatarsal amputation, CAD s/p stent. Admitted 03/28/25 d/t Sepsis from left thigh infected wound.    I anticipate this patient will require at least two midnights for appropriate medical management, necessitating inpatient admission because Sepsis from left thigh infected wound.    Patient will need a Med/Surg bed on EMERGENCY service .  The need is secondary to Sepsis from left thigh infected wound.    * Sepsis (HCC)- (present on admission)  Assessment & Plan  This is Sepsis Present on admission  SIRS criteria identified on my evaluation include: Tachycardia, with heart rate greater than 90 BPM and Leukocytosis, with WBC greater than 12,000  Clinical indicators of end organ dysfunction include Acute Respiratory Failure - (mechanical ventilation or BiPap or PaO2/FiO2 ratio < 300)  Source is Skin  Sepsis protocol initiated  Crystalloid Fluid Administration: restricted d/t ESRD on HD  IV antibiotics as appropriate for source of sepsis  Reassessment: I have reassessed the patient's hemodynamic status  Prior staph cellulitis.  - Empiric antibiotic therapy w/Unasyn and Vancomycin.   - Telemetry monitoring.   - Follow blood cultures.   - MRSA nares, pending.   - MAP goal > 65 mm Hg.   - CBC, CMP in AM.     Cellulitis of left thigh  Assessment & Plan  Left, lateral thigh open wound with serosanguineous drainage.  Surrounding erythema.  Associated sepsis.   - Unasyn 3 gr IV q 6 hrs.   - Vancomycin per pharmacy.   - Wound care consult, placed.     ESRD (end stage renal disease) on dialysis (HCC)  Assessment & Plan  Reportedly dialysis Mon and Wed only.   Had extra dialysis session yesterday (03/27/25) per Nephrology's recommendation d/t volume overload.   No hyperkalemia.  No evident fluid overload.   - No need for urgent nephrology consultation at this time. Consult in AM.   - HD as per Nephrology.     Anemia- (present on  admission)  Assessment & Plan   Latest Reference Range & Units 03/28/25 16:07   Hemoglobin 14.0 - 18.0 g/dL 7.4 (L)   Hematocrit 42.0 - 52.0 % 22.9 (L)   MCV 81.4 - 97.8 fL 94.6   (L): Data is abnormally low  Known normocytic anemia of chronic disease.     Diabetic enteropathy (HCC)- (present on admission)  Assessment & Plan  Chronic, intermittent diarrhea.  Per patient, on prescribed cholestyramine to control diarrhea.  - Resume cholestyramine inpatient.     Type 2 diabetes mellitus (HCC)- (present on admission)  Assessment & Plan  Complicated by diabetic retinopathy, nephropathy, and enteropathy.   Insulin-dependent. Compliant.   A1c: 6.5% this week, as per patient's report at Endo office visit.   Glucose at home reported 's.   - Resume Glargine inpatient.   - Lispro sliding scale.   - Hypoglycemia responsive measures in place.     Coronary artery disease- (present on admission)  Assessment & Plan  S/p stent placement.   No concerns for ACS.   - Continue Atorvastatin 80 mg daily.   - Telemetry monitoring.     Hypertension- (present on admission)  Assessment & Plan  Associated ESRD.   Elevated BP on presentation in setting of pain and acute distress.   Received Hydralazine IV x2.   BP now 140-160's / 60-80's.   No sign/symptoms of end organ damage.   - Continue Losartan 100 mg inpatient.  - Telemetry monitoring.  - Labetalol 10 mg IV q 4 hrs PRN.     S/P transmetatarsal amputation of foot, right (HCC)- (present on admission)  Assessment & Plan  Known history.   No open or infected wounds.   - Fall precautions.         VTE prophylaxis: SCDs/Lars Steward M.D.  PGY-3 Internal Medicine Resident

## 2025-03-29 NOTE — ED NOTES
Bedside report received by RN Anamika    Oxygen:2 L NC (needed after pain meds given)  Fall Risk status: bed in lowest position/locked, call light within reach  Patient Mentation:A+O x 4  Pending CTA, admission    Pt in CT

## 2025-03-29 NOTE — PROGRESS NOTES
[facility] Internal Medicine Daily Progress Note    Date of Service  3/29/2025    [facility] Team: [facility] IM Purple Team   Attending: Oly Bush M.d.  Senior Resident: Dr. Juan Carlos Greenberg  Intern:  Dr. Preeti Mark  Contact Number: [phone]    Chief Complaint  Beni Moore is a 54 y.o. male admitted 3/28/2025 with left leg cellulitis    Hospital Course  Beni Moore is a 54 y.o. male admitted 03/28/2025 for left calf diabetic foot with cellulitis. He has a PMHx of IDDM w/diabetic enteropathy-related diarrhea, ESRD on ED, Hypertension, Dyslipidemia, s/p left BKA, s/p right transmetatarsal amputation, CAD s/p stent.     Interval Problem Update  -Blood culture growing Strep pyogens, will switch abx to Ampicillin 2g Q4  -Wound on lateral aspect of left leg, draining serosanguinous discharge, wound care following  -MRSA nares negative  -HD today, nephro consulted   -Hb 7.3g s/p 1 unit on 03/28, will transfuse another unit prbc on 03/29 and recheck Hb after, hold chemical dvt prophylaxis    I have discussed this patient's plan of care and discharge plan at IDT rounds today with Case Management, Nursing, Nursing leadership, and other members of the IDT team.    Consultants/Specialty  nephrology    Code Status  Full Code    Disposition  The patient is not medically cleared for discharge to home or a post-acute facility.      I have placed the appropriate orders for post-discharge needs.    Review of Systems  Review of Systems   Constitutional: Negative.    HENT: Negative.     Eyes: Negative.    Respiratory: Negative.     Cardiovascular: Negative.    Gastrointestinal: Negative.    Musculoskeletal:  Positive for myalgias.   Skin:         Wound on lateral aspect of left leg, draining serosanguinous discharge.   Neurological: Negative.    Psychiatric/Behavioral: Negative.          Physical Exam  Temp:  [36.2 °C (97.2 °F)-38.1 °C (100.6 °F)] 36.7 °C (98 °F)  Pulse:  [] 80  Resp:  [15-20] 16  BP: (144-241)/()  178/60  SpO2:  [86 %-100 %] 95 %    Physical Exam  HENT:      Head: Normocephalic.      Mouth/Throat:      Mouth: Mucous membranes are moist.   Cardiovascular:      Pulses: Normal pulses.      Heart sounds: Normal heart sounds.   Pulmonary:      Effort: Pulmonary effort is normal.      Breath sounds: Normal breath sounds.   Abdominal:      General: Abdomen is flat.      Palpations: Abdomen is soft.   Musculoskeletal:         General: Swelling present.      Left lower leg: Edema present.      Comments: BKA amputation left; Right 5th toe s/p metatarsal ray amputation   Skin:     Capillary Refill: Capillary refill takes less than 2 seconds.      Findings: Lesion present.      Comments: Wound on lateral aspect of left leg, draining serosanguinous discharge.   Neurological:      General: No focal deficit present.      Mental Status: He is alert.         Fluids    Intake/Output Summary (Last 24 hours) at 3/29/2025 1443  Last data filed at 3/29/2025 1400  Gross per 24 hour   Intake 1434 ml   Output 0 ml   Net 1434 ml       Laboratory  Recent Labs     03/28/25  1607 03/29/25  0817   WBC 12.5* 8.6   RBC 2.42* 2.42*   HEMOGLOBIN 7.4* 7.3*   HEMATOCRIT 22.9* 23.3*   MCV 94.6 96.3   MCH 30.6 30.2   MCHC 32.3 31.3*   RDW 43.4 47.3   PLATELETCT 150* 118*   MPV 10.9 10.1     Recent Labs     03/28/25  1607 03/29/25  0817   SODIUM 132* 132*   POTASSIUM 4.3 4.3   CHLORIDE 94* 96   CO2 23 22   GLUCOSE 179* 92   BUN 51* 57*   CREATININE 6.48* 7.76*   CALCIUM 8.3* 7.7*     Recent Labs     03/28/25  1607   INR 1.20*               Imaging  CT-LOWER EXTREMITY UNILATERAL WITH CONTRAST LEFT   Final Result         1. Subcutaneous edema. This may be seen with cellulitis.   2. No drainable abscess is evident.   3. Below the knee amputation without evidence for osteomyelitis.      DX-CHEST-PORTABLE (1 VIEW)   Final Result      1.  Right lower lobe atelectasis and/or pneumonitis..      EC-ECHOCARDIOGRAM COMPLETE W/O CONT    (Results Pending)         Assessment/Plan  Problem Representation:    * Sepsis (HCC)- (present on admission)  Assessment & Plan  This is Sepsis Present on admission  SIRS criteria identified on my evaluation include: Tachycardia, with heart rate greater than 90 BPM and Leukocytosis, with WBC greater than 12,000  Clinical indicators of end organ dysfunction include Acute Respiratory Failure - (mechanical ventilation or BiPap or PaO2/FiO2 ratio < 300)  Source is Skin  Sepsis protocol initiated  Crystalloid Fluid Administration: restricted d/t ESRD on HD  IV antibiotics as appropriate for source of sepsis  Reassessment: I have reassessed the patient's hemodynamic status  Prior staph cellulitis.  - Switch to Ampicillin, see bacteremia plan  - Telemetry monitoring.   - MRSA nares,negative  - MAP goal > 65 mm Hg.     Cellulitis of left thigh  Assessment & Plan  Left, lateral leg open wound with serosanguineous drainage.  Surrounding erythema.  Associated sepsis.   -Switch abx to Ampicillin 2g Q4  -Wound care consult, following  -CT scan s/o cellulitis, ruled out osteomyelitis    Anemia- (present on admission)  Assessment & Plan  Known normocytic anemia of chronic disease  -Hb 7.3g s/p 1 unit on 03/28, will transfuse another unit prbc on 03/29 and recheck Hb after    Streptococcal bacteremia  Assessment & Plan  -Blood culture growing Strep pyogens, will switch abx to Ampicillin 2g Q4  -TTE ordered, to be followed up    ESRD (end stage renal disease) on dialysis (HCC)  Assessment & Plan  Reportedly dialysis Mon and Wed only.   Had extra dialysis session yesterday (03/27/25) per Nephrology's recommendation d/t volume overload.   No hyperkalemia.  No evident fluid overload.   -HD today, Dr. Plascencia consulted    Diabetic enteropathy (HCC)- (present on admission)  Assessment & Plan  Chronic, intermittent diarrhea.  Per patient, on prescribed cholestyramine to control diarrhea.  - Resume cholestyramine inpatient.     Type 2 diabetes mellitus (HCC)-  (present on admission)  Assessment & Plan  Complicated by diabetic retinopathy, nephropathy, and enteropathy.   Insulin-dependent. Compliant.   A1c: 6.5% this week, as per patient's report at Endo office visit.   Glucose at home reported 's.   - Lispro sliding scale.   - Hypoglycemia responsive measures in place.     Coronary artery disease- (present on admission)  Assessment & Plan  S/p stent placement.   No concerns for ACS.   - Continue Atorvastatin 80 mg daily.   - Telemetry monitoring.     Hypertension- (present on admission)  Assessment & Plan  Associated ESRD.   Elevated BP on presentation in setting of pain and acute distress.   Received Hydralazine IV x2.   BP now 140-160's / 60-80's.   No sign/symptoms of end organ damage.   - Continue Losartan 100 mg, torsemide 100mg, metalazone 5mg inpatient.  - Telemetry monitoring.  - Labetalol 10 mg IV q 4 hrs PRN.     S/P transmetatarsal amputation of foot, right (HCC)- (present on admission)  Assessment & Plan  Known history.   No open or infected wounds.   - Fall precautions.          VTE prophylaxis: SCDs/TEDs Hold Heparin for low Hb    I have performed a physical exam and reviewed and updated ROS and Plan today (3/29/2025). In review of yesterday's note (3/28/2025), there are no changes except as documented above.

## 2025-03-29 NOTE — CARE PLAN
The patient is Watcher - Medium risk of patient condition declining or worsening    Shift Goals  Clinical Goals: IV antibiotics; Monitor labs/vitals; pian management  Patient Goals: pain management; rest  Family Goals: JENNIFER    Progress made toward(s) clinical / shift goals:    Problem: Pain - Standard  Goal: Alleviation of pain or a reduction in pain to the patient’s comfort goal  Note: PRN oxycodone and dilaudid given.      Problem: Knowledge Deficit - Standard  Goal: Patient and family/care givers will demonstrate understanding of plan of care, disease process/condition, diagnostic tests and medications  Outcome: Progressing  Note: Patient AO x4. Fall risk precautions in place. 1 unit RBC transfused. Patient febrile at 100.6F after start of transfusion. Patient denies any symptoms of transfusion reaction. PRN tylenol given. Rechecked temp after 30 min: 99.5F. Patient continues to deny any symptoms of transfusion reaction.

## 2025-03-29 NOTE — ASSESSMENT & PLAN NOTE
Associated ESRD.   Elevated BP on presentation in setting of pain and acute distress.   Received Hydralazine IV x2.   BP now 140-160's / 60-80's.   No sign/symptoms of end organ damage.   - Continue Losartan 100 mg, torsemide 100mg, metalazone 5mg inpatient.  - Telemetry monitoring.  - Labetalol 10 mg IV q 4 hrs PRN.

## 2025-03-29 NOTE — ASSESSMENT & PLAN NOTE
This is Sepsis Present on admission  SIRS criteria identified on my evaluation include: Tachycardia, with heart rate greater than 90 BPM and Leukocytosis, with WBC greater than 12,000  Clinical indicators of end organ dysfunction include Acute Respiratory Failure - (mechanical ventilation or BiPap or PaO2/FiO2 ratio < 300)  Source is Skin  Sepsis protocol initiated  Crystalloid Fluid Administration: restricted d/t ESRD on HD  IV antibiotics as appropriate for source of sepsis  Reassessment: I have reassessed the patient's hemodynamic status  Prior staph cellulitis.  - Continue Ancef, see bacteremia plan  - Telemetry monitoring.   - MRSA nares,negative  - MAP goal > 65 mm Hg.

## 2025-03-29 NOTE — WOUND TEAM
[facility]  Inpatient Services  Initial Wound and Skin Care Evaluation    Admission Date: 3/28/2025     Last order of IP CONSULT TO WOUND CARE was found on 3/28/2025 from Hospital Encounter on 3/28/2025     [facility], PMH, SH: Reviewed    Past Surgical History:   Procedure Laterality Date    AV FISTULA REVISION Left 1/6/2025    Procedure: LEFT BRACHIOBASILIC FISTULA TRANSPOSITION;  Surgeon: Joyce Ureña M.D.;  Location: SURGERY SAME DAY [facility];  Service: General    AV FISTULA CREATION Left 11/04/2024    Procedure: CREATION OF LEFT UPPER EXTREMITY  DIALYSIS FISTULA;  Surgeon: Joyce Ureña M.D.;  Location: SURGERY SAME DAY [facility];  Service: General    ORIF, FRACTURE, HUMERUS, PROXIMAL Left 08/30/2024    Procedure: LEFT OPEN REDUCTION INTERNAL FIXATION  HUMERUS, PROXIMAL, NON UNION REPAIR;  Surgeon: Aris Pierre M.D.;  Location: [facility];  Service: Orthopedics    KNEE AMPUTATION BELOW Left 02/01/2024    Procedure: AMPUTATION, BELOW KNEE;  Surgeon: Celestino Segura M.D.;  Location: [facility];  Service: Orthopedics    TOE AMPUTATION Left 01/30/2024    Procedure: AMPUTATION, TOE- 5TH;  Surgeon: Celestino Segura M.D.;  Location: [facility];  Service: Orthopedics    PB OPEN TREATMENT PBOX HUMERAL FRACTURE Left 12/11/2023    Procedure: LEFT OPEN REDUCTION INTERNAL FIXATION  HUMERUS, PROXIMAL AND LEFT BICEPS TENODESIS;  Surgeon: Aris Pierre M.D.;  Location: [facility];  Service: Orthopedics    PB REPAIR BICEPS LONG TENDON Left 12/11/2023    Procedure: TENODESIS, BICEPS, OPEN;  Surgeon: Aris Pierre M.D.;  Location: [facility];  Service: Orthopedics    CATARACT EXTRACTION WITH IOL Bilateral 2023    TOE AMPUTATION Left 09/30/2021    Procedure: AMPUTATION, TOE;  Surgeon: Tomer Hart M.D.;  Location: [facility];  Service: Orthopedics    STENT PLACEMENT  2019    STEMI with nonobstructive LAD    TOE AMPUTATION Right 07/02/2018     Procedure: Transmetatarsal amputation;  Surgeon: Ravi Bernardo M.D.;  Location: SURGERY [facility];  Service: Orthopedics    ACHILLES TENDON REPAIR Right 2018    Procedure: ACHILLES LENGTHENING;  Surgeon: Ravi Bernardo M.D.;  Location: SURGERY [facility];  Service: Orthopedics    IRRIGATION & DEBRIDEMENT ORTHO Right 2018    Procedure: IRRIGATION & DEBRIDEMENT ORTHO;  Surgeon: Ravi Bernardo M.D.;  Location: SURGERY [facility];  Service: Orthopedics    OTHER CARDIAC SURGERY  2019    Stent installed right side    OTHER ORTHOPEDIC SURGERY      SHOULDER SURGERY  2023    Something is not right in my shoulder/arm    TONSILLECTOMY      VASECTOMY       Social History     Tobacco Use    Smoking status: Former     Current packs/day: 0.00     Average packs/day: 0.6 packs/day for 17.0 years (10.0 ttl pk-yrs)     Types: Cigarettes     Start date: 1988     Quit date:      Years since quittin.2     Passive exposure: Never    Smokeless tobacco: Never   Substance Use Topics    Alcohol use: Not Currently     Comment: Quit      Chief Complaint   Patient presents with    Wound Check     Pt w L BKA. Pt has a mis fitting prosthesis which has rubbed a sore.     Fever     Diagnosis: Sepsis (HCC) [A41.9]    Unit where seen by Wound Team: S150/00     WOUND CONSULT RELATED TO:  Left lower leg    WOUND TEAM PLAN OF CARE - Frequency of Follow-up:   Nursing to follow dressing orders written for wound care. Contact wound team if area fails to progress, deteriorates or with any questions/concerns if something comes up before next scheduled follow up (See below as to whether wound is following and frequency of wound follow up)     Weekly - Left BKA/knee x2    WOUND HISTORY:     Patient is a 54 y.o. male who was admitted with a LLE wound related to his prosthetic and diagnosed with sepsis. PMH significant for diabetes, ESRD on HD, CAD, HTN, R TMA, L BKA.        WOUND  ASSESSMENT/LDA  Wound 03/28/25 Pretibial Anterior;Lateral Left x2 (Active)   Date First Assessed/Time First Assessed: 03/28/25 1553   Present on Original Admission: Yes  Location: Pretibial  Wound Orientation: Anterior;Lateral  Laterality: Left  Wound Description (Comments): x2      Assessments 3/29/2025 11:00 AM   Wound Image      Site Assessment Purple;Yellow;Slough;Red;Granulation tissue;Boggy;Drainage;Edema;Painful   Periwound Assessment Red;Warm;Edema;Blanchable erythema   Margins Undefined edges   Closure Adhesive bandage   Drainage Amount Scant   Drainage Description Serosanguineous;Sanguineous   Treatments Cleansed;Nonselective debridement;Site care;Offloading;CSWD - Conservative Sharp Wound Debridement   Wound Cleansing Approved Wound Cleanser   Periwound Protectant No-sting Skin Prep   Dressing Status Clean;Dry;Intact   Dressing Changed New   Dressing Cleansing/Solutions Not Applicable   Dressing Options Hydrofera Blue Ready;Silicone Adhesive Foam   Dressing Change/Treatment Frequency Every 48 hrs, and As Needed   NEXT Dressing Change/Treatment Date 03/31/25   NEXT Weekly Photo (Inpatient Only) 04/02/25   Wound Team Following Weekly   Wound Length (cm) 3 cm   Wound Width (cm) 4 cm   Wound Surface Area (cm^2) 12 cm^2   Wound Bed Granulation (%) 50 %   Wound Bed Eschar (%) 50 %   Shape oval x1; irregular   Wound Odor None   Exposed Structures JENNIFER   WOUND NURSE ONLY - Time Spent with Patient (mins) 45     CT:   3/28/2025 6:46 PM     HISTORY/REASON FOR EXAM:  WOUND CHECK, FEVER.        TECHNIQUE/EXAM DESCRIPTION AND NUMBER OF VIEWS:  CT scan of the LEFT lower extremity with contrast, with reconstructions.     Thin helical 3 mm sections were obtained from the distal femur through the proximal tibia/fibula. Sagittal and coronal multiplanar reconstructions were generated from the axial images. A total of 100 mL of Omnipaque 350 nonionic contrast was administered   IV without complication.     Up to date radiation  dose reduction adjustments have been utilized to meet ALARA standards for radiation dose reduction.     COMPARISON: None.     FINDINGS:  The patient is status post below-the-knee amputation. There is left hip osteoarthrosis with mild degenerative changes at the pubic symphysis. There is no fracture or dislocation. There is no bone destruction to indicate osteomyelitis.     There is diffuse stranding of the subcutaneous tissues. This is consistent with edema and may be seen with cellulitis. There is no peripherally enhancing soft tissue fluid collection to indicate a drainable abscess. There is no soft tissue gas.     Extensive vascular calcifications are noted.     There is a small knee joint effusion. There is no significant Baker's cyst.     There is diffuse scrotal edema. There is wall thickening of the urinary bladder and free intraperitoneal fluid.     IMPRESSION:        1. Subcutaneous edema. This may be seen with cellulitis.  2. No drainable abscess is evident.  3. Below the knee amputation without evidence for osteomyelitis.        Exam Ended: 03/28/25 19:15 Last Resulted: 03/28/25 19:27       Vascular:    FLOWER:   1/29/24 prior to Left BKA    Lab Values:    Lab Results   Component Value Date/Time    WBC 8.6 03/29/2025 08:17 AM    RBC 2.42 (L) 03/29/2025 08:17 AM    HEMOGLOBIN 7.3 (L) 03/29/2025 08:17 AM    HEMATOCRIT 23.3 (L) 03/29/2025 08:17 AM    CREACTPROT 1.08 (H) 09/30/2021 04:58 AM    SEDRATEWES 55 (H) 09/30/2021 04:58 AM    HBA1C 8.0 (H) 08/29/2024 02:11 PM         Culture Results show:  No results found for this or any previous visit (from the past 720 hours).    Pain Level/Medicated:  None, Tolerated without pain medication       INTERVENTIONS BY WOUND TEAM:  Chart and images reviewed. Discussed with bedside RN. All areas of concern (based on picture review, LDA review and discussion with bedside RN) have been thoroughly assessed. Documentation of areas based on significant findings. This RN in to  assess patient. Performed standard wound care which includes appropriate positioning, dressing removal and non-selective debridement. Pictures and measurements obtained weekly if/when required.    Wound:  Left BKA/knee x2 (anterior, lateral)  Preparation for Dressing removal: Open to air  Cleansed/Non-selectively Debrided with:  Wound cleanser and Gauze  Non-Excisional Conservative Sharp debridement: Slough debrided away using curette < 20cm2 debrided down to slough and granulation tissue.  Scant bleeding noted, controlled with manual pressure.  Marta wound: Cleansed with Normal Saline, Wound cleanser, and Gauze, Prepped with No Sting  Primary Dressing:  hydrafera blue ready  Secondary (Outer) Dressing: offloading foam dressing    Advanced Wound Care Discharge Planning  Number of Clinicians necessary to complete wound care: 1  Is patient requiring IV pain medications for dressing changes:  No   Length of time for dressing change 45 min. (This does not include chart review, pre-medication time, set up, clean up or time spent charting.)    Interdisciplinary consultation: Patient, Bedside RN (Frida)  EVALUATION / RATIONALE FOR TREATMENT:     Date:  03/29/25  Wound Status:  Initial evaluation    The patient has two wounds to lateral knee due to friction from his ill-fitting LLE prosthetic. The anterior wound is brown scabbed and irregular in shape, the lateral wound is oval with yellow slough, pink unhealthy appearing granular tissue, and purple discoloration at the center of the wound. Scant serous drainage at this time. The periwound is edematous, boggy, and erythemic. Slough debrided with a curette with scant sanguinous drainage. Wound culture collected. The patient is receiving IV antibiotics.     The patient states that he has an appointment next week for a referral for a new prosthetic. OP wound referral ordered.       Goals: Steady decrease in wound area and depth weekly. New or adjusted prosthetic.     NURSING  PLAN OF CARE ORDERS:  Dressing changes: See Dressing Care orders  Skin care: See Skin Care orders    NUTRITION RECOMMENDATIONS   Wound Team Recommendations:  Dietary consult  Protein supplements  Arginine powder  Vitamin C supplements    DIET ORDERS (From admission to next 24h)       Start     Ordered    03/28/25 2058  Diet Order Diet: Consistent CHO (Diabetic)  ALL MEALS        Question:  Diet:  Answer:  Consistent CHO (Diabetic)    03/28/25 2057                    PREVENTATIVE INTERVENTIONS:    Q shift Johnny - performed per nursing policy  Q shift pressure point assessments - performed per nursing policy    Offloading/Redistribution  Offload L BKA with pillow - Currently in Place    Anticipated discharge plans:  Outpatient Wound Center        Vac Discharge Needs:  Vac Discharge plan is purely a recommendation from wound team and not a requirement for discharge unless otherwise stated by physician.  Not Applicable Pt not on a wound vac

## 2025-03-29 NOTE — THERAPY
Occupational Therapy   Initial Evaluation     Patient Name: Beni Moore  Age:  54 y.o., Sex:  male  Medical Record #: [id number]  Today's Date: 3/29/2025     Precautions  Precautions: Fall Risk  Comments: (P) L BKA    Assessment    Patient is 54 y.o. male admitted for LLE wound related to his prosthetic, sepsis; PMH diabetes, ESRD on HD, CAD, HTN, R TMA, L BKA. Pt normally independent with functional mobility and Adls, feels comfortable with wheelchair mobility and states entire home is set up for it. Anticipate no further OT needs will complete order at this time.     Plan    DC Equipment Recommendations: (P) None  Discharge Recommendations: (P) Anticipate that the patient will have no further occupational therapy needs after discharge from the hospital     Objective       03/29/25 0844   Prior Living Situation   Prior Services None   Housing / Facility 1 Story House   Steps Into Home 0   Steps In Home 0   Bathroom Set up Walk In Shower;Shower Chair;Grab Bars   Equipment Owned Wheelchair   Lives with - Patient's Self Care Capacity Alone and Able to Care For Self   Prior Level of ADL Function   Self Feeding Independent   Grooming / Hygiene Independent   Bathing Independent   Dressing Independent   Toileting Independent   Prior Level of IADL Function   Medication Management Independent   Laundry Independent   Kitchen Mobility Independent   Finances Independent   Home Management Independent   Shopping Independent   Prior Level Of Mobility Independent Without Device in Community   Driving / Transportation Driving Independent   Occupation (Pre-Hospital Vocational) Retired Due To Disability   History of Falls   History of Falls No   Precautions   Comments L BKA   Vitals   O2 Delivery Device None - Room Air   Pain 0 - 10 Group   Therapist Pain Assessment Post Activity Pain Same as Prior to Activity;Nurse Notified   Cognition    Cognition / Consciousness WDL   Level of Consciousness Alert   Active ROM Upper Body   Active  ROM Upper Body  WDL   Dominant Hand Right   Strength Upper Body   Upper Body Strength  WDL   Sensation Upper Body   Upper Extremity Sensation  WDL   Upper Body Muscle Tone   Upper Body Muscle Tone  WDL   Neurological Concerns   Neurological Concerns No   Coordination Upper Body   Coordination WDL   Balance Assessment   Sitting Balance (Static) Good   Sitting Balance (Dynamic) Good   Standing Balance (Static) Good   Standing Balance (Dynamic) Good   Weight Shift Sitting Good   Weight Shift Standing Good   Comments no AD, pivot to/from chair   Bed Mobility    Supine to Sit Supervised   Sit to Supine Supervised   Scooting Supervised   ADL Assessment   Grooming Supervision;Seated   Upper Body Dressing Supervision   Lower Body Dressing Supervision   How much help from another person does the patient currently need...   Putting on and taking off regular lower body clothing? 3   Bathing (including washing, rinsing, and drying)? 3   Toileting, which includes using a toilet, bedpan, or urinal? 3   Putting on and taking off regular upper body clothing? 3   Taking care of personal grooming such as brushing teeth? 4   Eating meals? 4   6 Clicks Daily Activity Score 20   Functional Mobility   Sit to Stand Supervised   Bed, Chair, Wheelchair Transfer Supervised   Transfer Method Stand Pivot   Mobility bed mobility, up to chair and back to bed   Comments no AD   Activity Tolerance   Sitting in Chair 3 min  (dialysis came)   Sitting Edge of Bed 5 min   Standing 2 min   Education Group   Education Provided Role of Occupational Therapist   Role of Occupational Therapist Patient Response Patient;Acceptance;Explanation   Problem List   Problem List None   Anticipated Discharge Equipment and Recommendations   DC Equipment Recommendations None   Discharge Recommendations Anticipate that the patient will have no further occupational therapy needs after discharge from the hospital   Interdisciplinary Plan of Care Collaboration   IDT  Collaboration with  Nursing;Physical Therapist   Patient Position at End of Therapy In Bed;Bed Alarm On;Call Light within Reach;Tray Table within Reach;Phone within Reach   Collaboration Comments RN updated

## 2025-03-29 NOTE — ASSESSMENT & PLAN NOTE
Latest Reference Range & Units 03/28/25 16:07   Hemoglobin 14.0 - 18.0 g/dL 7.4 (L)   Hematocrit 42.0 - 52.0 % 22.9 (L)   Known normocytic anemia of chronic disease  -Hb 7.3g s/p 1 unit on 03/28, will transfuse another unit prbc on 03/29 and recheck Hb after

## 2025-03-29 NOTE — PROGRESS NOTES
4 Eyes Skin Assessment Completed by Chelsie WINTERS RN and Urszula KULKARNI RN.    Head WDL  Ears WDL  Nose WDL  Mouth WDL  Neck WDL  Breast/Chest R chest Dialysis Catheter  Shoulder Blades WDL  Spine WDL  (R) Arm/Elbow/Hand Bruising  (L) Arm/Elbow/Hand Bruising and Scar  Abdomen Bruising  Groin WDL  Scrotum/Coccyx/Buttocks Redness and Blanching  (R) Leg Scab  (L) Leg Redness; Open wound  (R) Heel/Foot/Toe Redness and Blanching  (L) Heel/Foot/Toe BKA          Devices In Places Tele Box, Pulse Ox, and Nasal Cannula      Interventions In Place NC W/Ear Foams, Pillows, Heels Loaded W/Pillows, and Pressure Redistribution Mattress    Possible Skin Injury Yes    Pictures Uploaded Into Epic Yes  Wound Consult Placed Yes  RN Wound Prevention Protocol Ordered Yes

## 2025-03-29 NOTE — ED NOTES
Medication history reviewed with patient at bedside.   Med rec is complete  Allergies reviewed.     Patient has not had any outpatient antibiotics in the last 30 days.   Anticoagulants: No  Dispense history available in EPIC: Yes    Patient states he has not had any insulin 4-5 days    [facility] & [phone][facility] Clinic re-opens 0700 3/29/25.    Kirill Deluna PhT

## 2025-03-29 NOTE — ED NOTES
Transport at bedside to take pt to S150, pt belongings (clothes, prosthetic leg, phone) at bedside with pt, pt placed on full portable oxygen tank for transport upstairs (2LNC), pt aware of POC, no needs at this time

## 2025-03-29 NOTE — ASSESSMENT & PLAN NOTE
Complicated by diabetic retinopathy, nephropathy, and enteropathy.   Insulin-dependent. Compliant.   A1c: 6.5% this week, as per patient's report at Endo office visit.   Glucose at home reported 's.   - Lispro sliding scale.   - Hypoglycemia responsive measures in place.

## 2025-03-29 NOTE — ASSESSMENT & PLAN NOTE
Left, lateral leg open wound with serosanguineous drainage.  Surrounding erythema. Associated sepsis.   -Continue Ampicillin 2g Q4.  Awaiting susceptibilities with micro.  ID likely to be consulted thereafter for facilitating outpatient management.  Will likely need to coordinate this care with dialysis as patient currently going Monday Wednesday Friday and has vascular access for this.  -Wound care consult, following  -CT scan s/o cellulitis, ruled out osteomyelitis

## 2025-03-29 NOTE — ASSESSMENT & PLAN NOTE
S/p stent placement.   No concerns for ACS.   - Continue Atorvastatin 80 mg daily.   - Telemetry monitoring.

## 2025-03-29 NOTE — ASSESSMENT & PLAN NOTE
Known normocytic anemia of chronic disease  -Hb 7.3g s/p 1 unit on 03/28, will transfuse another unit prbc on 03/29 and recheck Hb after

## 2025-03-29 NOTE — ASSESSMENT & PLAN NOTE
Reportedly dialysis Mon and Wed only.   Had extra dialysis session (03/27/25) per Nephrology's recommendation d/t volume overload.    No hyperkalemia, or other evidence of fluid overload.   -HD per nephrology. appreciate recommendations

## 2025-03-29 NOTE — ASSESSMENT & PLAN NOTE
Associated ESRD.   Elevated BP on presentation in setting of pain and acute distress.   Received Hydralazine IV x2.   BP now 140-160's / 60-80's.   No sign/symptoms of end organ damage.   - Continue Losartan 100 mg, torsemide 100mg, metalazone 5mg inpatient..  continue  amlodipine 5 mg  - Telemetry monitoring.  - Labetalol 10 mg IV q 4 hrs PRN.

## 2025-03-29 NOTE — PROGRESS NOTES
[facility] Services Progress Note     Hemodialysis treatment ordered today per Dr. Melyssa Plascencia x 3.5 hours.     Treatment initiated at 09:40am x 13:10.      Patient tolerated treatment; see electronic flow sheet for details.      Net UF 4000 mL.      Post tx, CVC flushed with saline then locked with heparin 1000 units/mL per designated amount in each wing then clamped and capped. Aspirate heparin prior to next CVC use. Dressing changed per protocol.     Report given to Primary RN Frida Taylor.

## 2025-03-29 NOTE — CONSULTS
DATE OF SERVICE:  03/29/2025     NEPHROLOGY CONSULTATION     REQUESTING PHYSICIAN:  Troy Mata MD     REASON FOR CONSULTATION:  To evaluate and provide dialysis for patient with   end-stage renal disease.     HISTORY OF PRESENT ILLNESS:  The patient is a 54-year-old male with end-stage   renal disease, on hemodialysis; diabetes mellitus type 2, presented to the   emergency room with complaints of left stump infection, also worsening   shortness of breath, scheduled emergent dialysis for volume overload.     REVIEW OF SYSTEMS:  GENERAL:  No fatigue, malaise.  No fever or chills.  HEENT:  No nosebleeds, no sore throat, no sinus pain.  NECK:  No pain, no stiffness.  RESPIRATORY:  Positive for shortness of breath.  No cough, hemoptysis.  CARDIOVASCULAR:  Positive for dyspnea.  No palpitations, no chest pain.  GASTROINTESTINAL:  No abdominal pain, no nausea, vomiting, diarrhea.  All other systems reviewed, all negative.     PAST MEDICAL HISTORY:  End-stage renal disease, on hemodialysis; myocardial   infarction, hyperlipidemia, diabetes mellitus type 2, pneumonia.     PAST SURGICAL HISTORY:  Orthopedic surgery, vasectomy, tonsillectomy, toe   amputation, below-the-knee amputation, AV fistula creation.     FAMILY HISTORY:  Positive for diabetes mellitus type 2, hypertension, heart   disease.  No history of kidney disease.     SOCIAL HISTORY:  Used to smoke, quit.  No alcohol or drug use.     ALLERGIES:  No known drug allergies.     OUTPATIENT MEDICATIONS:  Reviewed.     PHYSICAL EXAMINATION:  VITAL SIGNS:  Blood pressure 190/90, pulse 105, temperature 37.8 Celsius.  GENERAL APPEARANCE:  Well-developed, well-nourished male in no acute distress.  HEENT:  Normocephalic, atraumatic.  Pupils equal, round, reactive to light.    Extraocular movement intact.  Nares patent.  Oropharynx clear, moist mucosa.    No erythema or exudate.  LUNGS:  Clear to auscultation bilaterally.  No rales, wheezes, no rhonchi.  HEART:   Regular rhythm, no rub or gallop.  ABDOMEN:  Soft, nontender, nondistended.  Bowel sounds present.  No palpable   mass.  EXTREMITIES:  Left below-the-knee amputation.  1+ edema.  NEUROLOGIC:  Alert, oriented x3, no focal deficit.  Cranial nerves II-XII   grossly intact.  SKIN:  Warm and dry.  No erythema or rash.     LABORATORY DATA:  Reviewed, revealed hemoglobin level 7.4.  Sodium 132,   potassium 4.3, CO2 of 23, BUN 51 and creatinine 6.48.     ASSESSMENT AND PLAN:  The patient is a 54-year-old male with end-stage renal   disease, on hemodialysis, admitted with left stump infection, hypervolemia.  1.  End-stage renal disease.  We will dialyze the patient emergently for   volume overload.  Continue to monitor daily for dialysis needs.  2.  Electrolytes remains well controlled.  3.  Hypertension.  Elevated blood pressure, likely due to volume overload,   should improve with volume correction.  4.  Anemia, low hemoglobin level.  We will add Epogen with dialysis.  5.  Left stump infection.  Antibiotics adjust to renal doses.     RECOMMENDATIONS:   1.  Emergent dialysis today, then continue Monday, Wednesday and Friday.  2.  To monitor CBC, basic metabolic panel.  3.  Provide renal diet.  4.  Adjust all medications to renal doses.  5.  We will follow the patient closely.     Thank you for the consult.        ______________________________  MD ROHINI DEMARCO/MANOLO    DD:  03/29/2025 10:36  DT:  03/29/2025 14:44    Job#:  [id number]

## 2025-03-29 NOTE — PROGRESS NOTES
Pharmacy Vancomycin Kinetics Note for 3/28/2025     54 y.o. male on Vancomycin day # 1     Vancomycin Indication (Trough based Dosing): Skin/skin structure infection (goal trough 10-15)      Active Antibiotics (From admission, onward)      Ordered     Ordering Provider       Fri Mar 28, 2025  8:08 PM    03/28/25 2008  ampicillin/sulbactam (Unasyn) 3 g in  mL IVPB  EVERY EVENING         Newtno Watson M.D.    03/28/25 2008  MD Alert...Vancomycin per Pharmacy  (MD Alert...Vancomycin per Pharmacy)  PHARMACY TO DOSE        Question:  Indication(s) for vancomycin?  Answer:  Skin and soft tissue infection    Newton Watson M.D.       Fri Mar 28, 2025  4:47 PM    03/28/25 1647  vancomycin (Vancocin) 2,500 mg in  mL IVPB  (vancomycin (VANCOCIN) IV (LD + Maintenance))  ONCE        Note to Pharmacy: Dose per Pharmacokinetic Protocol    Troy Mata M.D.            Dosing Weight: 102 kg (224 lb 13.9 oz)      Admission History: Admitted on 3/28/2025 for Sepsis ([facility]) [A41.9]  Pertinent history: Patient here with concern for cellulitis. Empiric antibiotics started. History of dialysis dependent renal failure.    Allergies:     Patient has no known allergies.     Pertinent cultures to date:     Results       Procedure Component Value Units Date/Time    Blood Culture - Draw one from central line and one from peripheral site [[id number]] Collected: 03/28/25 1632    Order Status: Completed Specimen: Blood from Line Updated: 03/28/25 2008     Significant Indicator NEG     Source BLD     Site Peripheral     Culture Result No Growth  Note: Blood cultures are incubated for 5 days and  are monitored continuously.Positive blood cultures  are called to the RN and reported as soon as  they are identified.      Blood Culture - Draw one from central line and one from peripheral site [[id number]] Collected: 03/28/25 1632    Order Status: Completed Specimen: Blood from Peripheral Updated: 03/28/25 1808     Significant  Indicator NEG     Source BLD     Site PERIPHERAL     Culture Result No Growth  Note: Blood cultures are incubated for 5 days and  are monitored continuously.Positive blood cultures  are called to the RN and reported as soon as  they are identified.      Urinalysis [[id number]]     Order Status: Sent Specimen: Urine     Urine Culture (New) [[id number]]     Order Status: Sent Specimen: Urine             Labs:     Estimated Creatinine Clearance: 15.8 mL/min (A) (by C-G formula based on SCr of 6.48 mg/dL (HH)).  Recent Labs     25  1607   WBC 12.5*   NEUTSPOLYS 86.80*     Recent Labs     25  1607   BUN 51*   CREATININE 6.48*   ALBUMIN 3.8     No intake or output data in the 24 hours ending 25   BP (!) 144/84   Pulse (!) 105   Temp 37.5 °C (99.5 °F) (Temporal)   Resp 20   Ht 1.829 m (6')   Wt 102 kg (225 lb 15.5 oz)   SpO2 96%  Temp (24hrs), Av.7 °C (99.8 °F), Min:37.5 °C (99.5 °F), Max:37.8 °C (100.1 °F)      List concerns for Vancomycin clearance:     ESRD    A/P:     -  Vancomycin dose: Pulse dose    -  Next vancomycin level(s): 3-5 days    -  Comments: Start pulse dosing following loading dose. Minimal clearance with dialysis. Level in 3-5 days to assess.       Edi Naik, KristinD

## 2025-03-30 ENCOUNTER — APPOINTMENT (OUTPATIENT)
Dept: CARDIOLOGY | Facility: MEDICAL CENTER | Age: 55
End: 2025-03-30
Payer: MEDICARE

## 2025-03-30 LAB
ALBUMIN SERPL BCP-MCNC: 3.1 G/DL (ref 3.2–4.9)
ALBUMIN/GLOB SERPL: 1 G/DL
ALP SERPL-CCNC: 69 U/L (ref 30–99)
ALT SERPL-CCNC: 8 U/L (ref 2–50)
ANION GAP SERPL CALC-SCNC: 10 MMOL/L (ref 7–16)
APPEARANCE UR: CLEAR
AST SERPL-CCNC: 16 U/L (ref 12–45)
BACTERIA #/AREA URNS HPF: ABNORMAL /HPF
BILIRUB SERPL-MCNC: 1.6 MG/DL (ref 0.1–1.5)
BILIRUB UR QL STRIP.AUTO: NEGATIVE
BUN SERPL-MCNC: 38 MG/DL (ref 8–22)
CALCIUM ALBUM COR SERPL-MCNC: 8.7 MG/DL (ref 8.5–10.5)
CALCIUM SERPL-MCNC: 8 MG/DL (ref 8.5–10.5)
CASTS URNS QL MICRO: ABNORMAL /LPF (ref 0–2)
CHLORIDE SERPL-SCNC: 97 MMOL/L (ref 96–112)
CO2 SERPL-SCNC: 26 MMOL/L (ref 20–33)
COLOR UR: YELLOW
CREAT SERPL-MCNC: 5.84 MG/DL (ref 0.5–1.4)
EPITHELIAL CELLS 1715: ABNORMAL /HPF (ref 0–5)
ERYTHROCYTE [DISTWIDTH] IN BLOOD BY AUTOMATED COUNT: 49.1 FL (ref 35.9–50)
GFR SERPLBLD CREATININE-BSD FMLA CKD-EPI: 11 ML/MIN/1.73 M 2
GLOBULIN SER CALC-MCNC: 3.1 G/DL (ref 1.9–3.5)
GLUCOSE BLD STRIP.AUTO-MCNC: 116 MG/DL (ref 65–99)
GLUCOSE BLD STRIP.AUTO-MCNC: 144 MG/DL (ref 65–99)
GLUCOSE BLD STRIP.AUTO-MCNC: 152 MG/DL (ref 65–99)
GLUCOSE BLD STRIP.AUTO-MCNC: 158 MG/DL (ref 65–99)
GLUCOSE SERPL-MCNC: 109 MG/DL (ref 65–99)
GLUCOSE UR STRIP.AUTO-MCNC: 250 MG/DL
HCT VFR BLD AUTO: 24.6 % (ref 42–52)
HGB BLD-MCNC: 8.1 G/DL (ref 14–18)
KETONES UR STRIP.AUTO-MCNC: NEGATIVE MG/DL
LEUKOCYTE ESTERASE UR QL STRIP.AUTO: NEGATIVE
LV EJECT FRACT MOD 2C 99903: 54.21
LV EJECT FRACT MOD 4C 99902: 59.81
LV EJECT FRACT MOD BP 99901: 55.52
MCH RBC QN AUTO: 31.3 PG (ref 27–33)
MCHC RBC AUTO-ENTMCNC: 32.9 G/DL (ref 32.3–36.5)
MCV RBC AUTO: 95 FL (ref 81.4–97.8)
MICRO URNS: ABNORMAL
NITRITE UR QL STRIP.AUTO: NEGATIVE
PH UR STRIP.AUTO: 8 [PH] (ref 5–8)
PLATELET # BLD AUTO: 128 K/UL (ref 164–446)
PMV BLD AUTO: 10.8 FL (ref 9–12.9)
POTASSIUM SERPL-SCNC: 4.2 MMOL/L (ref 3.6–5.5)
PROT SERPL-MCNC: 6.2 G/DL (ref 6–8.2)
PROT UR QL STRIP: >=1000 MG/DL
RBC # BLD AUTO: 2.59 M/UL (ref 4.7–6.1)
RBC # URNS HPF: ABNORMAL /HPF (ref 0–2)
RBC UR QL AUTO: ABNORMAL
SODIUM SERPL-SCNC: 133 MMOL/L (ref 135–145)
SP GR UR STRIP.AUTO: 1.02
UROBILINOGEN UR STRIP.AUTO-MCNC: 0.2 EU/DL
WBC # BLD AUTO: 6.2 K/UL (ref 4.8–10.8)
WBC #/AREA URNS HPF: ABNORMAL /HPF

## 2025-03-30 PROCEDURE — 87086 URINE CULTURE/COLONY COUNT: CPT

## 2025-03-30 PROCEDURE — 700102 HCHG RX REV CODE 250 W/ 637 OVERRIDE(OP)

## 2025-03-30 PROCEDURE — 770020 HCHG ROOM/CARE - TELE (206)

## 2025-03-30 PROCEDURE — 93306 TTE W/DOPPLER COMPLETE: CPT

## 2025-03-30 PROCEDURE — 80053 COMPREHEN METABOLIC PANEL: CPT

## 2025-03-30 PROCEDURE — 85027 COMPLETE CBC AUTOMATED: CPT

## 2025-03-30 PROCEDURE — 700111 HCHG RX REV CODE 636 W/ 250 OVERRIDE (IP): Mod: JZ,TB

## 2025-03-30 PROCEDURE — 36415 COLL VENOUS BLD VENIPUNCTURE: CPT

## 2025-03-30 PROCEDURE — A9270 NON-COVERED ITEM OR SERVICE: HCPCS

## 2025-03-30 PROCEDURE — 82962 GLUCOSE BLOOD TEST: CPT | Mod: 91

## 2025-03-30 PROCEDURE — 99233 SBSQ HOSP IP/OBS HIGH 50: CPT | Mod: GC | Performed by: INTERNAL MEDICINE

## 2025-03-30 PROCEDURE — 700111 HCHG RX REV CODE 636 W/ 250 OVERRIDE (IP)

## 2025-03-30 PROCEDURE — 700105 HCHG RX REV CODE 258

## 2025-03-30 PROCEDURE — 99232 SBSQ HOSP IP/OBS MODERATE 35: CPT | Performed by: INTERNAL MEDICINE

## 2025-03-30 PROCEDURE — 81001 URINALYSIS AUTO W/SCOPE: CPT

## 2025-03-30 PROCEDURE — 93306 TTE W/DOPPLER COMPLETE: CPT | Mod: 26 | Performed by: INTERNAL MEDICINE

## 2025-03-30 RX ORDER — AMLODIPINE BESYLATE 5 MG/1
5 TABLET ORAL
Status: DISCONTINUED | OUTPATIENT
Start: 2025-03-31 | End: 2025-04-01

## 2025-03-30 RX ORDER — OXYCODONE HYDROCHLORIDE 5 MG/1
5 TABLET ORAL EVERY 6 HOURS PRN
Refills: 0 | Status: DISCONTINUED | OUTPATIENT
Start: 2025-03-30 | End: 2025-04-02 | Stop reason: HOSPADM

## 2025-03-30 RX ORDER — OXYCODONE HYDROCHLORIDE 5 MG/1
2.5 TABLET ORAL EVERY 6 HOURS PRN
Refills: 0 | Status: DISCONTINUED | OUTPATIENT
Start: 2025-03-30 | End: 2025-04-02 | Stop reason: HOSPADM

## 2025-03-30 RX ORDER — AMLODIPINE BESYLATE 2.5 MG/1
2.5 TABLET ORAL
Status: DISCONTINUED | OUTPATIENT
Start: 2025-03-30 | End: 2025-03-30

## 2025-03-30 RX ORDER — HYDROMORPHONE HYDROCHLORIDE 1 MG/ML
0.25 INJECTION, SOLUTION INTRAMUSCULAR; INTRAVENOUS; SUBCUTANEOUS EVERY 4 HOURS PRN
Status: DISCONTINUED | OUTPATIENT
Start: 2025-03-30 | End: 2025-04-02 | Stop reason: HOSPADM

## 2025-03-30 RX ADMIN — ASPIRIN 81 MG: 81 TABLET, COATED ORAL at 05:24

## 2025-03-30 RX ADMIN — OXYCODONE HYDROCHLORIDE 5 MG: 5 TABLET ORAL at 18:59

## 2025-03-30 RX ADMIN — LOSARTAN POTASSIUM 100 MG: 50 TABLET, FILM COATED ORAL at 05:24

## 2025-03-30 RX ADMIN — OMEPRAZOLE 20 MG: 20 CAPSULE, DELAYED RELEASE ORAL at 05:24

## 2025-03-30 RX ADMIN — TORSEMIDE 100 MG: 100 TABLET ORAL at 05:24

## 2025-03-30 RX ADMIN — AMPICILLIN SODIUM 2000 MG: 2 INJECTION, POWDER, FOR SOLUTION INTRAVENOUS at 16:56

## 2025-03-30 RX ADMIN — METOLAZONE 5 MG: 5 TABLET ORAL at 05:24

## 2025-03-30 RX ADMIN — HYDROMORPHONE HYDROCHLORIDE 0.25 MG: 1 INJECTION, SOLUTION INTRAMUSCULAR; INTRAVENOUS; SUBCUTANEOUS at 22:55

## 2025-03-30 RX ADMIN — CHOLESTYRAMINE 4 G: 4 POWDER, FOR SUSPENSION ORAL at 16:51

## 2025-03-30 RX ADMIN — CHOLESTYRAMINE 4 G: 4 POWDER, FOR SUSPENSION ORAL at 10:56

## 2025-03-30 RX ADMIN — AMPICILLIN SODIUM 2000 MG: 2 INJECTION, POWDER, FOR SOLUTION INTRAVENOUS at 05:29

## 2025-03-30 RX ADMIN — INSULIN LISPRO 1 UNITS: 100 INJECTION, SOLUTION INTRAVENOUS; SUBCUTANEOUS at 17:56

## 2025-03-30 RX ADMIN — INSULIN LISPRO 1 UNITS: 100 INJECTION, SOLUTION INTRAVENOUS; SUBCUTANEOUS at 21:26

## 2025-03-30 RX ADMIN — OXYCODONE HYDROCHLORIDE 5 MG: 5 TABLET ORAL at 10:57

## 2025-03-30 RX ADMIN — AMLODIPINE BESYLATE 2.5 MG: 2.5 TABLET ORAL at 08:46

## 2025-03-30 RX ADMIN — LABETALOL HYDROCHLORIDE 10 MG: 5 INJECTION, SOLUTION INTRAVENOUS at 06:24

## 2025-03-30 RX ADMIN — ATORVASTATIN CALCIUM 80 MG: 80 TABLET, FILM COATED ORAL at 21:20

## 2025-03-30 ASSESSMENT — ENCOUNTER SYMPTOMS
PALPITATIONS: 0
EYES NEGATIVE: 1
CONSTITUTIONAL NEGATIVE: 1
VOMITING: 0
SHORTNESS OF BREATH: 0
CHILLS: 0
FEVER: 0
NAUSEA: 0
ORTHOPNEA: 0
ABDOMINAL PAIN: 0
COUGH: 0
CARDIOVASCULAR NEGATIVE: 1
HEMOPTYSIS: 0
GASTROINTESTINAL NEGATIVE: 1
MYALGIAS: 1
WHEEZING: 0
PSYCHIATRIC NEGATIVE: 1
RESPIRATORY NEGATIVE: 1
NEUROLOGICAL NEGATIVE: 1
SINUS PAIN: 0

## 2025-03-30 ASSESSMENT — FIBROSIS 4 INDEX: FIB4 SCORE: 2.474873734152916335

## 2025-03-30 ASSESSMENT — PAIN DESCRIPTION - PAIN TYPE
TYPE: ACUTE PAIN

## 2025-03-30 NOTE — PROGRESS NOTES
Nephrology Daily Progress Note    Date of Service  3/30/2025    Chief Complaint  54 y.o. male with ESRD/HD,admitted 3/28/2025 with stump infection, hypervolemia    Interval Problem Update  3/30 -feels better today  Improved SOB  No acute events  HD MWF    Review of Systems  Review of Systems   Constitutional:  Negative for chills, fever and malaise/fatigue.   HENT:  Negative for congestion, hearing loss and sinus pain.    Eyes: Negative.    Respiratory:  Negative for cough, hemoptysis, shortness of breath and wheezing.    Cardiovascular:  Negative for chest pain, palpitations, orthopnea and leg swelling.   Gastrointestinal:  Negative for abdominal pain, nausea and vomiting.   Skin: Negative.    All other systems reviewed and are negative.       Physical Exam  Temp:  [36.2 °C (97.2 °F)-37.1 °C (98.8 °F)] 36.7 °C (98.1 °F)  Pulse:  [77-89] 77  Resp:  [16-18] 18  BP: (153-199)/() 174/78  SpO2:  [92 %-97 %] 96 %    Physical Exam  Vitals reviewed.   Constitutional:       General: He is not in acute distress.     Appearance: Normal appearance. He is well-developed. He is not diaphoretic.   HENT:      Head: Normocephalic and atraumatic.      Nose: Nose normal.      Mouth/Throat:      Mouth: Mucous membranes are moist.      Pharynx: Oropharynx is clear.   Eyes:      Extraocular Movements: Extraocular movements intact.      Conjunctiva/sclera: Conjunctivae normal.      Pupils: Pupils are equal, round, and reactive to light.   Cardiovascular:      Rate and Rhythm: Normal rate and regular rhythm.      Pulses: Normal pulses.      Heart sounds: Normal heart sounds.   Pulmonary:      Effort: Pulmonary effort is normal. No respiratory distress.      Breath sounds: Normal breath sounds. No wheezing or rales.   Abdominal:      General: Bowel sounds are normal. There is no distension.      Palpations: Abdomen is soft. There is no mass.      Tenderness: There is no abdominal tenderness. There is no right CVA tenderness or left  "CVA tenderness.   Musculoskeletal:      Cervical back: Normal range of motion and neck supple.      Right lower leg: No edema.      Comments: Left BKA   Skin:     General: Skin is warm.      Coloration: Skin is not pale.      Findings: No erythema or rash.   Neurological:      General: No focal deficit present.      Mental Status: He is alert and oriented to person, place, and time.      Cranial Nerves: No cranial nerve deficit.      Coordination: Coordination normal.   Psychiatric:         Mood and Affect: Mood normal.         Behavior: Behavior normal.         Thought Content: Thought content normal.         Judgment: Judgment normal.         Fluids    Intake/Output Summary (Last 24 hours) at 3/30/2025 1033  Last data filed at 3/30/2025 0800  Gross per 24 hour   Intake 1976 ml   Output 4860 ml   Net -2884 ml       Laboratory  Recent Labs     03/29/25  0817 03/29/25  1940 03/30/25  0212   WBC 8.6 6.9 6.2   RBC 2.42* 2.81* 2.59*   HEMOGLOBIN 7.3* 8.5* 8.1*   HEMATOCRIT 23.3* 26.4* 24.6*   MCV 96.3 94.0 95.0   MCH 30.2 30.2 31.3   MCHC 31.3* 32.2* 32.9   RDW 47.3 47.8 49.1   PLATELETCT 118* 121* 128*   MPV 10.1 10.5 10.8     Recent Labs     03/28/25  1607 03/29/25  0817 03/30/25  0212   SODIUM 132* 132* 133*   POTASSIUM 4.3 4.3 4.2   CHLORIDE 94* 96 97   CO2 23 22 26   GLUCOSE 179* 92 109*   BUN 51* 57* 38*   CREATININE 6.48* 7.76* 5.84*   CALCIUM 8.3* 7.7* 8.0*     Recent Labs     03/28/25  1607   INR 1.20*     No results for input(s): \"NTPROBNP\" in the last 72 hours.        Imaging  CT-LOWER EXTREMITY UNILATERAL WITH CONTRAST LEFT   Final Result         1. Subcutaneous edema. This may be seen with cellulitis.   2. No drainable abscess is evident.   3. Below the knee amputation without evidence for osteomyelitis.      DX-CHEST-PORTABLE (1 VIEW)   Final Result      1.  Right lower lobe atelectasis and/or pneumonitis..      EC-ECHOCARDIOGRAM COMPLETE W/O CONT    (Results Pending)        Assessment/Plan      The " patient is a 54-year-old male with end-stage renal   disease, on hemodialysis, admitted with left stump infection, hypervolemia.  1.  End-stage renal disease on HD -on MWF schedule  2.  Electrolytes: mild hyponatremia -improving  3.  Hypertension.  Elevated blood pressure, likely due to volume overload,        Continue UF with HD as tolerates, amlodipine 5 mg daily  4.  Anemia, low hemoglobin level -improving -Epogen with dialysis.  5.  Left stump infection.  Antibiotics adjust to renal doses.     RECOMMENDATIONS:   1.  HD on Monday, Wednesday and Friday.  2.  To monitor CBC, basic metabolic panel.  3.  Renal diet.  4.  Adjust all medications to renal doses.  5.  Monitor BP -adjust amlodipine dose  We will follow the patient closely.

## 2025-03-30 NOTE — PROGRESS NOTES
Report received from Frida HENAO, bedside report completed and eyes on the pt. Lab results and notes have been reviewed. The pt is resting in bed and all needs are met at this time. The bed is locked/lowered, and the call light is within reach. Will continue to monitor for any changes.

## 2025-03-30 NOTE — PROGRESS NOTES
[facility] Internal Medicine Daily Progress Note    Date of Service  3/30/2025    [facility] Team: R IM Purple Team   Attending: Oly Bush M.d.  Senior Resident: Dr. Juan Carlos Greenberg  Intern:  Dr. Preeti Mark  Contact Number: [phone]    Chief Complaint  Beni Moore is a 54 y.o. male admitted 3/28/2025 with left leg cellulitis, group A streptococcal bacteremia    Hospital Course  Beni Moore is a 54 y.o. male admitted 03/28/2025 for left calf diabetic foot with cellulitis. He has a PMHx of IDDM w/diabetic enteropathy-related diarrhea, ESRD on ED, Hypertension, Dyslipidemia, s/p left BKA, s/p right transmetatarsal amputation, CAD s/p stent.   He was found to have a left lower extremity cellulitis, and have group a streptococcal bacteremia.  Antibiotics have been narrowed to ampicillin IV.  Echocardiogram ordered and pending at this time      Interval Problem Update  No acute events overnight.  Continued improvement in cellulitis of the left lower extremity.  No plans for dialysis today but will continue on Monday Wednesday Friday per nephrology.  -Added amlodipine this morning.  Will titrate to effect    I have discussed this patient's plan of care and discharge plan at IDT rounds today with Case Management, Nursing, Nursing leadership, and other members of the IDT team.    Consultants/Specialty  nephrology    Code Status  Full Code    Disposition  The patient is not medically cleared for discharge to home or a post-acute facility.  Anticipate discharge to: home with close outpatient follow-up    I have placed the appropriate orders for post-discharge needs.    Review of Systems  Review of Systems   Constitutional: Negative.    HENT: Negative.     Eyes: Negative.    Respiratory: Negative.     Cardiovascular: Negative.    Gastrointestinal: Negative.    Musculoskeletal:  Positive for myalgias.   Skin:         Wound on lateral aspect of left leg, draining serosanguinous discharge.   Neurological: Negative.     Psychiatric/Behavioral: Negative.          Physical Exam  Temp:  [36.2 °C (97.2 °F)-37.1 °C (98.8 °F)] 36.8 °C (98.2 °F)  Pulse:  [77-89] 78  Resp:  [16-18] 17  BP: (156-199)/() 173/71  SpO2:  [92 %-97 %] 93 %    Physical Exam  HENT:      Head: Normocephalic.      Mouth/Throat:      Mouth: Mucous membranes are moist.   Cardiovascular:      Pulses: Normal pulses.      Heart sounds: Normal heart sounds.   Pulmonary:      Effort: Pulmonary effort is normal.      Breath sounds: Normal breath sounds.   Abdominal:      General: Abdomen is flat.      Palpations: Abdomen is soft.   Musculoskeletal:         General: Swelling present.      Left lower leg: Edema present.      Comments: BKA amputation left; Right 5th toe s/p metatarsal ray amputation   Skin:     Capillary Refill: Capillary refill takes less than 2 seconds.      Findings: Lesion present.      Comments: Wound on lateral aspect of left leg, draining serosanguinous discharge.   Neurological:      General: No focal deficit present.      Mental Status: He is alert.         Fluids    Intake/Output Summary (Last 24 hours) at 3/30/2025 1207  Last data filed at 3/30/2025 1051  Gross per 24 hour   Intake 2376 ml   Output 4860 ml   Net -2484 ml       Laboratory  Recent Labs     03/29/25  0817 03/29/25  1940 03/30/25  0212   WBC 8.6 6.9 6.2   RBC 2.42* 2.81* 2.59*   HEMOGLOBIN 7.3* 8.5* 8.1*   HEMATOCRIT 23.3* 26.4* 24.6*   MCV 96.3 94.0 95.0   MCH 30.2 30.2 31.3   MCHC 31.3* 32.2* 32.9   RDW 47.3 47.8 49.1   PLATELETCT 118* 121* 128*   MPV 10.1 10.5 10.8     Recent Labs     03/28/25  1607 03/29/25  0817 03/30/25  0212   SODIUM 132* 132* 133*   POTASSIUM 4.3 4.3 4.2   CHLORIDE 94* 96 97   CO2 23 22 26   GLUCOSE 179* 92 109*   BUN 51* 57* 38*   CREATININE 6.48* 7.76* 5.84*   CALCIUM 8.3* 7.7* 8.0*     Recent Labs     03/28/25  1607   INR 1.20*               Imaging  EC-ECHOCARDIOGRAM COMPLETE W/O CONT         CT-LOWER EXTREMITY UNILATERAL WITH CONTRAST LEFT   Final  Result         1. Subcutaneous edema. This may be seen with cellulitis.   2. No drainable abscess is evident.   3. Below the knee amputation without evidence for osteomyelitis.      DX-CHEST-PORTABLE (1 VIEW)   Final Result      1.  Right lower lobe atelectasis and/or pneumonitis..           Assessment/Plan  Problem Representation:    * Sepsis (HCC)- (present on admission)  Assessment & Plan  This is Sepsis Present on admission  SIRS criteria identified on my evaluation include: Tachycardia, with heart rate greater than 90 BPM and Leukocytosis, with WBC greater than 12,000  Clinical indicators of end organ dysfunction include Acute Respiratory Failure - (mechanical ventilation or BiPap or PaO2/FiO2 ratio < 300)  Source is Skin  Sepsis protocol initiated  Crystalloid Fluid Administration: restricted d/t ESRD on HD  IV antibiotics as appropriate for source of sepsis  Reassessment: I have reassessed the patient's hemodynamic status  Prior staph cellulitis.  - Continue Ampicillin, see bacteremia plan  - Telemetry monitoring.   - MRSA nares,negative  - MAP goal > 65 mm Hg.     Cellulitis of left thigh- (present on admission)  Assessment & Plan  Left, lateral leg open wound with serosanguineous drainage.  Surrounding erythema. Associated sepsis.   -Continue Ampicillin 2g Q4.  Awaiting susceptibilities with micro.  ID likely to be consulted thereafter for facilitating outpatient management.  Will likely need to coordinate this care with dialysis as patient currently going Monday Wednesday Friday and has vascular access for this.  -Wound care consult, following  -CT scan s/o cellulitis, ruled out osteomyelitis    ESRD (end stage renal disease) on dialysis ([facility])- (present on admission)  Assessment & Plan  Reportedly dialysis Mon and Wed only.   Had extra dialysis session (03/27/25) per Nephrology's recommendation d/t volume overload.  No further plans for dialysis on March 30.  No hyperkalemia, or other evidence of fluid  overload.   -HD today, Dr. Plascencia consulted; appreciate recommendations    Hypertension- (present on admission)  Assessment & Plan  Associated ESRD.   Elevated BP on presentation in setting of pain and acute distress.   Received Hydralazine IV x2.   BP now 140-160's / 60-80's.   No sign/symptoms of end organ damage.   - Continue Losartan 100 mg, torsemide 100mg, metalazone 5mg inpatient..  Adding amlodipine 5 mg  - Telemetry monitoring.  - Labetalol 10 mg IV q 4 hrs PRN.     Streptococcal bacteremia- (present on admission)  Assessment & Plan  -Blood culture growing Strep pyogens. Ampicillin 2g Q4  -TTE ordered, to be followed up.  Pending results at this time.    Anemia- (present on admission)  Assessment & Plan  Known normocytic anemia of chronic disease  -Hb 7.3g s/p 1 unit on 03/28, will transfuse another unit prbc on 03/29 and recheck Hb after    Diabetic enteropathy (HCC)- (present on admission)  Assessment & Plan  Chronic, intermittent diarrhea.  Per patient, on prescribed cholestyramine to control diarrhea.  - Resume cholestyramine inpatient.     Type 2 diabetes mellitus (HCC)- (present on admission)  Assessment & Plan  Complicated by diabetic retinopathy, nephropathy, and enteropathy.   Insulin-dependent. Compliant.   A1c: 6.5% this week, as per patient's report at Endo office visit.   Glucose at home reported 's.   - Lispro sliding scale.   - Hypoglycemia responsive measures in place.     Coronary artery disease- (present on admission)  Assessment & Plan  S/p stent placement.   No concerns for ACS.   - Continue Atorvastatin 80 mg daily.   - Telemetry monitoring.     S/P transmetatarsal amputation of foot, right (HCC)- (present on admission)  Assessment & Plan  Known history.   No open or infected wounds.   - Fall precautions.          VTE prophylaxis: SCDs/TEDs Hold Heparin for low Hb    I have performed a physical exam and reviewed and updated ROS and Plan today (3/30/2025). In review of yesterday's  note (3/29/2025), there are no changes except as documented above.

## 2025-03-30 NOTE — DIETARY
Nutrition Services: Diabetes Education Consult   Day 2 of admit.  Beni Moore is a 54 y.o. male with admitting DX of Sepsis     RD received consult for consistent CHO diet education. RD included nutrition recommendations and tips in dietary discharge instructions that align with pt's current dx. Outpatient resources included to encourage follow-up with [facility] for continued support after discharge.     No other education needs identified at this time. Please consult RD as needed for supplemental education or at request of pt.

## 2025-03-30 NOTE — CARE PLAN
The patient is Stable - Low risk of patient condition declining or worsening    Shift Goals  Clinical Goals: Pain control, monitor labs, mobility, prevent skin breakdown  Patient Goals: Pain control, rest  Family Goals: JENNIFER    Progress made toward(s) clinical / shift goals:    Problem: Pain - Standard  Goal: Alleviation of pain or a reduction in pain to the patient’s comfort goal  Description: Target End Date:  Prior to discharge or change in level of careDocument on Vitals flowsheet1.  Document pain using the appropriate pain scale per order or unit policy2.  Educate and implement non-pharmacologic comfort measures (i.e. relaxation, distraction, massage, cold/heat therapy, etc.)3.  Pain management medications as ordered4.  Reassess pain after pain med administration per policy5.  If opiods administered assess patient's response to pain medication is appropriate per POSS sedation scale6.  Follow pain management plan developed in collaboration with patient and interdisciplinary team (including palliative care or pain specialists if applicable)  Outcome: Progressing  Note: PRN pain medications in use for pain control.       Problem: Knowledge Deficit - Standard  Goal: Patient and family/care givers will demonstrate understanding of plan of care, disease process/condition, diagnostic tests and medications  Description: Target End Date:  1-3 days or as soon as patient condition allowsDocument in Patient Education1.  Patient and family/caregiver oriented to unit, equipment, visitation policy and means for communicating concern2.  Complete/review Learning Assessment3.  Assess knowledge level of disease process/condition, treatment plan, diagnostic tests and medications4.  Explain disease process/condition, treatment plan, diagnostic tests and medications  Outcome: Progressing  Note: Plan of care discussed with patent. He is alert and oriented times four.  Discussed medications administration, fall risk, oral care,  mobility, safety, etc.      Problem: Respiratory  Goal: Patient will achieve/maintain optimum respiratory ventilation and gas exchange  Description: Target End Date:  Prior to discharge or change in level of careDocument on Assessment flowsheet1.  Assess and monitor rate, rhythm, depth and effort of respiration2.  Breath sounds assessed qshift and/or as needed3.  Assess O2 saturation, administer/titrate oxygen as ordered4.  Position patient for maximum ventilatory efficiency5.  Turn, cough, and deep breath with splinting to improve effectiveness6.  Collaborate with RT to administer medication/treatments per order7.  Encourage use of incentive spirometer and encourage patient to cough after use and utilize splinting techniques if applicable8.  Airway suctioning9.  Monitor sputum production for changes in color, consistency and [id number]. Perform frequent oral [id number]. Alternate physical activity with rest periods  Outcome: Progressing     Problem: Fall Risk  Goal: Patient will remain free from falls  Description: Target End Date:  Prior to discharge or change in level of careDocument interventions on the Bernabe Jamar Fall Risk Assessment1.  Assess for fall risk factors2.  Implement fall precautions  Outcome: Progressing  Note: Patient has remained free of falls this shift. Instructed patient to use call light for help. Call light always placed within reach when leaving room. Patient's room has been cleared of clutter such as cords and extra chairs.        Patient is not progressing towards the following goals:

## 2025-03-30 NOTE — CARE PLAN
The patient is Stable - Low risk of patient condition declining or worsening    Shift Goals  Clinical Goals: pain management; monitor labs/vitals; IV antibiotic  Patient Goals: pain management; rest  Family Goals: JENNIFER    Progress made toward(s) clinical / shift goals:    Problem: Pain - Standard  Goal: Alleviation of pain or a reduction in pain to the patient’s comfort goal  Outcome: Progressing  Note: PRN oxycodone and dilaudid given.     Problem: Knowledge Deficit - Standard  Goal: Patient and family/care givers will demonstrate understanding of plan of care, disease process/condition, diagnostic tests and medications  Outcome: Progressing  Note: Dressing in place to L stump. IV antibiotic given. Tele in place.

## 2025-03-30 NOTE — CARE PLAN
The patient is Stable - Low risk of patient condition declining or worsening    Shift Goals  Clinical Goals: IV abx;monitor labs/vitals; pain control  Patient Goals: Pain control  Family Goals: JENNIFER    Progress made toward(s) clinical / shift goals:    Problem: Pain - Standard  Goal: Alleviation of pain or a reduction in pain to the patient’s comfort goal  Description: Target End Date:  Prior to discharge or change in level of careDocument on Vitals flowsheet1.  Document pain using the appropriate pain scale per order or unit policy2.  Educate and implement non-pharmacologic comfort measures (i.e. relaxation, distraction, massage, cold/heat therapy, etc.)3.  Pain management medications as ordered4.  Reassess pain after pain med administration per policy5.  If opiods administered assess patient's response to pain medication is appropriate per POSS sedation scale6.  Follow pain management plan developed in collaboration with patient and interdisciplinary team (including palliative care or pain specialists if applicable)  Outcome: Progressing     Problem: Knowledge Deficit - Standard  Goal: Patient and family/care givers will demonstrate understanding of plan of care, disease process/condition, diagnostic tests and medications  Description: Target End Date:  1-3 days or as soon as patient condition allowsDocument in Patient Education1.  Patient and family/caregiver oriented to unit, equipment, visitation policy and means for communicating concern2.  Complete/review Learning Assessment3.  Assess knowledge level of disease process/condition, treatment plan, diagnostic tests and medications4.  Explain disease process/condition, treatment plan, diagnostic tests and medications  Outcome: Progressing     Problem: Respiratory  Goal: Patient will achieve/maintain optimum respiratory ventilation and gas exchange  Description: Target End Date:  Prior to discharge or change in level of careDocument on Assessment flowsheet1.   Assess and monitor rate, rhythm, depth and effort of respiration2.  Breath sounds assessed qshift and/or as needed3.  Assess O2 saturation, administer/titrate oxygen as ordered4.  Position patient for maximum ventilatory efficiency5.  Turn, cough, and deep breath with splinting to improve effectiveness6.  Collaborate with RT to administer medication/treatments per order7.  Encourage use of incentive spirometer and encourage patient to cough after use and utilize splinting techniques if applicable8.  Airway suctioning9.  Monitor sputum production for changes in color, consistency and [id number]. Perform frequent oral [id number]. Alternate physical activity with rest periods  Outcome: Progressing     Problem: Fall Risk  Goal: Patient will remain free from falls  Description: Target End Date:  Prior to discharge or change in level of careDocument interventions on the Bernabe Jamar Fall Risk Assessment1.  Assess for fall risk factors2.  Implement fall precautions  Outcome: Progressing       Patient is not progressing towards the following goals:

## 2025-03-30 NOTE — HOSPITAL COURSE
Beni Moore is a 54 y.o. male admitted 03/28/2025 for left calf diabetic foot with cellulitis. He has a PMHx of IDDM w/diabetic enteropathy-related diarrhea, ESRD on ED, Hypertension, Dyslipidemia, s/p left BKA, s/p right transmetatarsal amputation, CAD s/p stent.   He was found to have a left lower extremity cellulitis, and have group a streptococcal bacteremia.  Antibiotics were narrowed to ampicillin however echocardiogram showing new valvular pathology especially tricuspid regurgitation, mitral regurgitation, and new mobile echodensity of aortic valve consistent with endocarditis.  Cardiology and infectious disease have been now consulted.  Antibiotics escalated to Ancef, appreciate recommendations from cardiology regarding aortic valvular pathology.   LVM for pt letting him know his 6/9 new pt appt with Dr. Jefferson has been CANCELED.    Provided office number for pt to call back to r/s with any cardiologist.

## 2025-03-30 NOTE — DISCHARGE INSTR - DIET
Carbohydrate Counting for People With Diabetes  Foods with carbohydrates make your blood glucose level go up. Learning how to count carbohydrates can help you control your blood glucose levels. First, identify the foods you eat that contain carbohydrates. Then, using the Foods with Carbohydrates chart, determine about how much carbohydrates are in your meals and snacks. Make sure you are eating foods with fiber, protein, and healthy fat along with your carbohydrate foods.    Foods with Carbohydrates  The following table shows carbohydrate foods that have about 15 grams of carbohydrate each. Using measuring cups, spoons, or a food scale when you first begin learning about carbohydrate counting can help you learn about the portion sizes you typically eat.      The following foods have 15 grams carbohydrate each:    Grains  1 slice bread (1 ounce)  1 small tortilla (6-inch size)  ¼ large bagel (1 ounce)  1/3 cup pasta or rice (cooked)  ½ hamburger or hot dog bun (¾ ounce)  ½ cup cooked cereal  ½ to ¾ cup ready-to-eat cereal  2 taco shells (5-inch size)    Fruit  1 small fresh fruit (¾ to 1 cup)  ½ medium banana  17 small grapes (3 ounces)  1 cup melon or berries  ½ cup canned or frozen fruit  2 tablespoons dried fruit (blueberries, cherries, cranberries, raisins)  ½ cup unsweetened fruit juice    Starchy Vegetables  ½ cup cooked beans, peas, corn, potatoes/sweet potatoes  ¼ large baked potato (3 ounces)  1 cup acorn or butternut squash    Snack Foods  3 to 6 crackers  8 potato chips or 13 tortilla chips (¾ ounce to 1 ounce)  3 cups popped popcorn    Dairy  3/4 cup (6 ounces) nonfat plain yogurt, or yogurt with sugar-free sweetener  1 cup milk  1 cup plain rice, soy, coconut or flavored almond milk    Sweets and Desserts  ½ cup ice cream or frozen yogurt  1 tablespoon jam, jelly, pancake syrup, table sugar, or honey  2 tablespoons light pancake syrup  1 inch square of frosted cake or 2 inch square of unfrosted cake  2  small cookies (2/3 ounce each) or ¼ large cookie    Sometimes you’ll have to estimate carbohydrate amounts if you don’t know the exact recipe. One cup of mixed foods like soups can have 1 to 2 carbohydrate servings, while some casseroles might have 2 or more servings of carbohydrate.    Foods that have less than 20 calories in each serving can be counted as “free” foods. Count 1 cup raw vegetables, or ½ cup cooked non-starchy vegetables as “free” foods. If you eat 3 or more servings at one meal, then count them as 1 carbohydrate serving.    Foods without Carbohydrates  Not all foods contain carbohydrates. Meat, some dairy, fats, non-starchy vegetables, and many beverages don’t contain carbohydrate. So when you count carbohydrates, you can generally exclude chicken, pork, beef, fish, seafood, eggs, tofu, cheese, butter, sour cream, avocado, nuts, seeds, olives, mayonnaise, water, black coffee, unsweetened tea, and zero-calorie drinks. Vegetables with no or low carbohydrate include green beans, cauliflower, tomatoes, and onions.    How much carbohydrate should I eat at each meal?  Carbohydrate counting can help you plan your meals and manage your weight. Following are some starting points for carbohydrate intake at each meal. Work with your registered dietitian nutritionist to find the best range that works for your blood glucose and weight.       Women  2 - 3 carb servings (To Lose Weight)  3 - 4 carb servings (To Maintain Weight)    Men  3 - 4 carb servings (To Lose Weight)  4 - 5 carb servings (To Maintain Weight)      Checking your blood glucose after meals will help you know if you need to adjust the timing, type, or number of carbohydrate servings in your meal plan. Achieve and keep a healthy body weight by balancing your food intake and physical activity.         How should I plan my meals?  Plan for half the food on your plate to include non-starchy vegetables, like salad greens, broccoli, or carrots. Try to  eat 3 to 5 servings of non-starchy vegetables every day. Have a protein food at each meal. Protein foods include chicken, fish, meat, eggs, or beans (note that beans contain carbohydrate). These two food groups (non-starchy vegetables and proteins) are low in carbohydrate. If you fill up your plate with these foods, you will eat less carbohydrate but still fill up your stomach. Try to limit your carbohydrate portion to ¼ of the plate.    What fats are healthiest to eat?  Diabetes increases risk for heart disease. To help protect your heart, eat more healthy fats, such as olive oil, nuts, and avocado. Eat less saturated fats like butter, cream, and high-fat meats, like rowell and sausage. Avoid trans fats, which are in all foods that list “partially hydrogenated oil” as an ingredient.      What should I drink?  Choose drinks that are not sweetened with sugar. The healthiest choices are water, carbonated or seltzer ross, and tea and coffee without added sugars.    Sweet drinks will make your blood glucose go up very quickly. One serving of soda or energy drink is ½ cup. It is best to drink these beverages only if your blood glucose is low.    Artificially sweetened, or diet drinks, typically do not increase your blood glucose if they have zero calories in them. Read labels of beverages, as some diet drinks do have carbohydrate and will raise your blood glucose.    Label Reading Tips  Read Nutrition Facts labels to find out how many grams of carbohydrate are in a food you want to eat. Don’t forget: sometimes serving sizes on the label aren’t the same as how much food you are going to eat, so you may need to calculate how much carbohydrate is in the food you are serving yourself.        The Nutrition Facts information is based on a standard serving size. However, that serving size may not be the same as the serving size used in carbohydrate counting.  Always start by checking the serving size on the label. Is this the  serving size you will be eating? How many servings are in the package?  Next, look at the total carbohydrate. It is measured in grams (g). To find the number of carbohydrate servings in 1 standard serving of a food, divide the total grams of carbohydrate by 15. One (1) carbohydrate serving is the amount of food with 15 g carbohydrate.  You don’t need to count grams of sugars. They are included in the total carbohydrate.  The label shows how many calories are in the standard serving. It also lists the amount of fat, cholesterol, sodium, protein, and some vitamins and minerals. Talk to your registered dietitian nutritionist or diabetes educator to learn about your goals for these nutrients.  Look below the line listing total fat to find out how much of that fat is saturated fat or trans fat. Choose foods that are low in these kinds of fats because they are not healthy for your heart. In the foods that are healthiest for your heart, grams of saturated fat and trans fat are less than one-third of the total fat grams.  If foods are very low in calories (less than 20 calories per serving) or carbohydrates (5 g carbohydrate or less per serving), you may not need to count them when you count carbohydrates. Ask your registered dietitian nutritionist or diabetes educator about these “free” foods.        Nutrition Counseling  Our expert team offers:   Medical Nutrition Therapy for Chronic Conditions   Weight Management   Diabetes Education and Management   Wellness Services   Body Composition Measurements   Gastrointestinal Health    [facility] are located at:  [address] [place] [id number]  For more information and to schedule a consultation, please call [phone].  A physician referral may be required by your insurance for coverage.

## 2025-03-30 NOTE — PROGRESS NOTES
TELEMETRY STRIP SUMMARY:    Rhythm: SR  HR: 73-87 bpm  Ectopy: rPVC, rTrigem, rPAC    OH: 0.14  QRS: 0.09  QT: 0.40

## 2025-03-31 VITALS
DIASTOLIC BLOOD PRESSURE: 82 MMHG | TEMPERATURE: 98.4 F | HEIGHT: 72 IN | HEART RATE: 79 BPM | BODY MASS INDEX: 30.43 KG/M2 | SYSTOLIC BLOOD PRESSURE: 159 MMHG | WEIGHT: 224.65 LBS | RESPIRATION RATE: 17 BRPM | OXYGEN SATURATION: 93 %

## 2025-03-31 PROBLEM — I35.8 ENDOCARDITIS OF AORTIC VALVE: Status: ACTIVE | Noted: 2025-03-31

## 2025-03-31 PROBLEM — I33.0 ACUTE BACTERIAL ENDOCARDITIS: Status: ACTIVE | Noted: 2025-03-31

## 2025-03-31 LAB
ALBUMIN SERPL BCP-MCNC: 3 G/DL (ref 3.2–4.9)
ALBUMIN/GLOB SERPL: 1 G/DL
ALP SERPL-CCNC: 50 U/L (ref 30–99)
ALT SERPL-CCNC: <5 U/L (ref 2–50)
ANION GAP SERPL CALC-SCNC: 13 MMOL/L (ref 7–16)
AST SERPL-CCNC: 15 U/L (ref 12–45)
BACTERIA BLD CULT: ABNORMAL
BACTERIA BLD CULT: ABNORMAL
BACTERIA BLD CULT: NORMAL
BACTERIA BLD CULT: NORMAL
BACTERIA UR CULT: NORMAL
BACTERIA WND AEROBE CULT: ABNORMAL
BACTERIA WND AEROBE CULT: ABNORMAL
BILIRUB SERPL-MCNC: 1 MG/DL (ref 0.1–1.5)
BUN SERPL-MCNC: 50 MG/DL (ref 8–22)
CALCIUM ALBUM COR SERPL-MCNC: 8.5 MG/DL (ref 8.5–10.5)
CALCIUM SERPL-MCNC: 7.7 MG/DL (ref 8.5–10.5)
CHLORIDE SERPL-SCNC: 96 MMOL/L (ref 96–112)
CO2 SERPL-SCNC: 22 MMOL/L (ref 20–33)
CREAT SERPL-MCNC: 7.23 MG/DL (ref 0.5–1.4)
ERYTHROCYTE [DISTWIDTH] IN BLOOD BY AUTOMATED COUNT: 47.5 FL (ref 35.9–50)
ETEST SENSITIVITY ETEST: NORMAL
GFR SERPLBLD CREATININE-BSD FMLA CKD-EPI: 8 ML/MIN/1.73 M 2
GLOBULIN SER CALC-MCNC: 3 G/DL (ref 1.9–3.5)
GLUCOSE BLD STRIP.AUTO-MCNC: 128 MG/DL (ref 65–99)
GLUCOSE BLD STRIP.AUTO-MCNC: 129 MG/DL (ref 65–99)
GLUCOSE BLD STRIP.AUTO-MCNC: 149 MG/DL (ref 65–99)
GLUCOSE BLD STRIP.AUTO-MCNC: 156 MG/DL (ref 65–99)
GLUCOSE SERPL-MCNC: 160 MG/DL (ref 65–99)
GRAM STN SPEC: ABNORMAL
HCT VFR BLD AUTO: 25.4 % (ref 42–52)
HGB BLD-MCNC: 8.1 G/DL (ref 14–18)
MCH RBC QN AUTO: 30.2 PG (ref 27–33)
MCHC RBC AUTO-ENTMCNC: 31.9 G/DL (ref 32.3–36.5)
MCV RBC AUTO: 94.8 FL (ref 81.4–97.8)
PLATELET # BLD AUTO: 128 K/UL (ref 164–446)
PMV BLD AUTO: 10.6 FL (ref 9–12.9)
POTASSIUM SERPL-SCNC: 4.2 MMOL/L (ref 3.6–5.5)
PROT SERPL-MCNC: 6 G/DL (ref 6–8.2)
RBC # BLD AUTO: 2.68 M/UL (ref 4.7–6.1)
SIGNIFICANT IND 70042: ABNORMAL
SIGNIFICANT IND 70042: ABNORMAL
SIGNIFICANT IND 70042: NORMAL
SITE SITE: ABNORMAL
SITE SITE: ABNORMAL
SITE SITE: NORMAL
SODIUM SERPL-SCNC: 131 MMOL/L (ref 135–145)
SOURCE SOURCE: ABNORMAL
SOURCE SOURCE: ABNORMAL
SOURCE SOURCE: NORMAL
WBC # BLD AUTO: 6.8 K/UL (ref 4.8–10.8)

## 2025-03-31 PROCEDURE — 700111 HCHG RX REV CODE 636 W/ 250 OVERRIDE (IP)

## 2025-03-31 PROCEDURE — A9270 NON-COVERED ITEM OR SERVICE: HCPCS

## 2025-03-31 PROCEDURE — 700102 HCHG RX REV CODE 250 W/ 637 OVERRIDE(OP)

## 2025-03-31 PROCEDURE — 36415 COLL VENOUS BLD VENIPUNCTURE: CPT

## 2025-03-31 PROCEDURE — A9270 NON-COVERED ITEM OR SERVICE: HCPCS | Performed by: INTERNAL MEDICINE

## 2025-03-31 PROCEDURE — 80053 COMPREHEN METABOLIC PANEL: CPT

## 2025-03-31 PROCEDURE — 99233 SBSQ HOSP IP/OBS HIGH 50: CPT | Mod: GC | Performed by: INTERNAL MEDICINE

## 2025-03-31 PROCEDURE — 99222 1ST HOSP IP/OBS MODERATE 55: CPT | Performed by: INTERNAL MEDICINE

## 2025-03-31 PROCEDURE — 82962 GLUCOSE BLOOD TEST: CPT | Mod: 91

## 2025-03-31 PROCEDURE — 90935 HEMODIALYSIS ONE EVALUATION: CPT

## 2025-03-31 PROCEDURE — 85027 COMPLETE CBC AUTOMATED: CPT

## 2025-03-31 PROCEDURE — 770020 HCHG ROOM/CARE - TELE (206)

## 2025-03-31 PROCEDURE — 700111 HCHG RX REV CODE 636 W/ 250 OVERRIDE (IP): Mod: JZ,TB | Performed by: INTERNAL MEDICINE

## 2025-03-31 PROCEDURE — 700105 HCHG RX REV CODE 258

## 2025-03-31 PROCEDURE — 90935 HEMODIALYSIS ONE EVALUATION: CPT | Performed by: INTERNAL MEDICINE

## 2025-03-31 PROCEDURE — 99223 1ST HOSP IP/OBS HIGH 75: CPT | Performed by: INTERNAL MEDICINE

## 2025-03-31 PROCEDURE — 700102 HCHG RX REV CODE 250 W/ 637 OVERRIDE(OP): Performed by: INTERNAL MEDICINE

## 2025-03-31 PROCEDURE — 87040 BLOOD CULTURE FOR BACTERIA: CPT | Mod: 91

## 2025-03-31 PROCEDURE — 700111 HCHG RX REV CODE 636 W/ 250 OVERRIDE (IP): Mod: JZ | Performed by: INTERNAL MEDICINE

## 2025-03-31 RX ORDER — METHOXY POLYETHYLENE GLYCOL-EPOETIN BETA 75 UG/.3ML
75 INJECTION, SOLUTION INTRAVENOUS
COMMUNITY

## 2025-03-31 RX ORDER — CEFAZOLIN SODIUM 1 G/50ML
1 INJECTION, SOLUTION INTRAVENOUS EVERY EVENING
Status: DISCONTINUED | OUTPATIENT
Start: 2025-03-31 | End: 2025-04-01

## 2025-03-31 RX ADMIN — OMEPRAZOLE 20 MG: 20 CAPSULE, DELAYED RELEASE ORAL at 05:50

## 2025-03-31 RX ADMIN — LABETALOL HYDROCHLORIDE 10 MG: 5 INJECTION, SOLUTION INTRAVENOUS at 21:14

## 2025-03-31 RX ADMIN — LABETALOL HYDROCHLORIDE 10 MG: 5 INJECTION, SOLUTION INTRAVENOUS at 12:07

## 2025-03-31 RX ADMIN — TORSEMIDE 100 MG: 100 TABLET ORAL at 05:50

## 2025-03-31 RX ADMIN — ASPIRIN 81 MG: 81 TABLET, COATED ORAL at 05:50

## 2025-03-31 RX ADMIN — OXYCODONE HYDROCHLORIDE 5 MG: 5 TABLET ORAL at 01:59

## 2025-03-31 RX ADMIN — LOSARTAN POTASSIUM 100 MG: 50 TABLET, FILM COATED ORAL at 05:49

## 2025-03-31 RX ADMIN — ATORVASTATIN CALCIUM 80 MG: 80 TABLET, FILM COATED ORAL at 20:07

## 2025-03-31 RX ADMIN — CHOLESTYRAMINE 4 G: 4 POWDER, FOR SUSPENSION ORAL at 18:39

## 2025-03-31 RX ADMIN — CHOLESTYRAMINE 4 G: 4 POWDER, FOR SUSPENSION ORAL at 10:27

## 2025-03-31 RX ADMIN — INSULIN LISPRO 1 UNITS: 100 INJECTION, SOLUTION INTRAVENOUS; SUBCUTANEOUS at 23:23

## 2025-03-31 RX ADMIN — EPOETIN ALFA-EPBX 5000 UNITS: 3000 INJECTION, SOLUTION INTRAVENOUS; SUBCUTANEOUS at 16:24

## 2025-03-31 RX ADMIN — METOLAZONE 5 MG: 5 TABLET ORAL at 05:49

## 2025-03-31 RX ADMIN — AMLODIPINE BESYLATE 5 MG: 5 TABLET ORAL at 05:50

## 2025-03-31 RX ADMIN — OXYCODONE HYDROCHLORIDE 5 MG: 5 TABLET ORAL at 20:07

## 2025-03-31 RX ADMIN — AMPICILLIN SODIUM 2000 MG: 2 INJECTION, POWDER, FOR SOLUTION INTRAVENOUS at 05:57

## 2025-03-31 RX ADMIN — CEFAZOLIN SODIUM 1 G: 1 INJECTION, SOLUTION INTRAVENOUS at 17:58

## 2025-03-31 ASSESSMENT — PAIN DESCRIPTION - PAIN TYPE
TYPE: ACUTE PAIN

## 2025-03-31 ASSESSMENT — ENCOUNTER SYMPTOMS
CONSTITUTIONAL NEGATIVE: 1
GASTROINTESTINAL NEGATIVE: 1
CHILLS: 0
EYES NEGATIVE: 1
MYALGIAS: 1
FEVER: 0
PSYCHIATRIC NEGATIVE: 1
SHORTNESS OF BREATH: 1
ABDOMINAL PAIN: 0
VOMITING: 0
CARDIOVASCULAR NEGATIVE: 1
RESPIRATORY NEGATIVE: 1
DIARRHEA: 0
NAUSEA: 0
NEUROLOGICAL NEGATIVE: 1

## 2025-03-31 ASSESSMENT — FIBROSIS 4 INDEX: FIB4 SCORE: 3.09

## 2025-03-31 NOTE — PROGRESS NOTES
I verified Mircera with [facility] [phone].   Updated Floor h to findings.

## 2025-03-31 NOTE — DISCHARGE PLANNING
Outpatient Dialysis Note     Confirmed patient is active at:     [facility]  [address], [place] [address]     Schedule: Mon, Wed (2 tx/ week only)  Time: Mon- 2:00 PM Wed- 10:00 AM     Patient is followed by Dr. Angel Mcghee with [facility] at facility who confirmed patient information.   Forwarded records for review.     Xitlaly Reyes   Dialysis Coordinator / [facility]  Ph: [phone]

## 2025-03-31 NOTE — PROGRESS NOTES
1329: Patient off the floor for dialysis. He is alert and oriented times four. Dialysis nurse aware of HTN and will monitor. Per dialysis RN ok to be off the floor for dilaysis due to LOC being medsurg despite tele on     1347: Patient LOC is tele. Dialysis nurse to check with MD if patient ok to remain downstairs for dialysis     1428; Per MD, ok to stay downstairs for dialysis

## 2025-03-31 NOTE — CARE PLAN
The patient is Stable - Low risk of patient condition declining or worsening    Shift Goals  Clinical Goals: Pain management; plan of care; safety; emotional support  Patient Goals: Pain control; plan of care; meet with team  Family Goals: JENNIFER    Progress made toward(s) clinical / shift goals:    Problem: Pain - Standard  Goal: Alleviation of pain or a reduction in pain to the patient’s comfort goal  Description: Target End Date:  Prior to discharge or change in level of careDocument on Vitals flowsheet1.  Document pain using the appropriate pain scale per order or unit policy2.  Educate and implement non-pharmacologic comfort measures (i.e. relaxation, distraction, massage, cold/heat therapy, etc.)3.  Pain management medications as ordered4.  Reassess pain after pain med administration per policy5.  If opiods administered assess patient's response to pain medication is appropriate per POSS sedation scale6.  Follow pain management plan developed in collaboration with patient and interdisciplinary team (including palliative care or pain specialists if applicable)  Outcome: Progressing     Problem: Knowledge Deficit - Standard  Goal: Patient and family/care givers will demonstrate understanding of plan of care, disease process/condition, diagnostic tests and medications  Description: Target End Date:  1-3 days or as soon as patient condition allowsDocument in Patient Education1.  Patient and family/caregiver oriented to unit, equipment, visitation policy and means for communicating concern2.  Complete/review Learning Assessment3.  Assess knowledge level of disease process/condition, treatment plan, diagnostic tests and medications4.  Explain disease process/condition, treatment plan, diagnostic tests and medications  Outcome: Progressing     Problem: Hemodynamics  Goal: Patient's hemodynamics, fluid balance and neurologic status will be stable or improve  Description: Target End Date:  Prior to discharge or change in  level of careDocument on Assessment and I/O flowsheet templates1.  Monitor vital signs, pulse oximetry and cardiac monitor per provider order and/or policy2.  Maintain blood pressure per provider order3.  Hemodynamic monitoring per provider order4.  Manage IV fluids and IV infusions5.  Monitor intake and output6.  Daily weights per unit policy or provider order7.  Assess peripheral pulses and capillary refill8.  Assess color and body temperature9.  Position patient for maximum circulation/cardiac [id number]. Monitor for signs/symptoms of excessive [id number]. Assess mental status, restlessness and changes in level of [id number]. Monitor temperature and report fever or hypothermia to provider immediately. Consideration of targeted temperature management.  Outcome: Progressing     Problem: Fluid Volume  Goal: Fluid volume balance will be maintained  Description: Target End Date:  Prior to discharge or change in level of careDocument on I/O flowsheet1.  Monitor intake and output as ordered2.  Promote oral intake as appropriate3.  Report inadequate intake or output to physician4.  Administer IV therapy as ordered5.  Weights per provider order6.  Assess for signs and symptoms of bleeding7.  Monitor for signs of fluid overload (respiratory changes, edema, weight gain, increased abdominal girth)8.  Monitor of signs for inadequate fluid volume (poor skin turgor, dry mucous membranes)9.  Instruct patient on adherence to fluid restrictions  Outcome: Progressing     Problem: Respiratory  Goal: Patient will achieve/maintain optimum respiratory ventilation and gas exchange  Description: Target End Date:  Prior to discharge or change in level of careDocument on Assessment flowsheet1.  Assess and monitor rate, rhythm, depth and effort of respiration2.  Breath sounds assessed qshift and/or as needed3.  Assess O2 saturation, administer/titrate oxygen as ordered4.  Position patient for maximum ventilatory efficiency5.  Turn,  cough, and deep breath with splinting to improve effectiveness6.  Collaborate with RT to administer medication/treatments per order7.  Encourage use of incentive spirometer and encourage patient to cough after use and utilize splinting techniques if applicable8.  Airway suctioning9.  Monitor sputum production for changes in color, consistency and [id number]. Perform frequent oral [id number]. Alternate physical activity with rest periods  Outcome: Progressing     Problem: Fall Risk  Goal: Patient will remain free from falls  Description: Target End Date:  Prior to discharge or change in level of careDocument interventions on the Bernabe Jamar Fall Risk Assessment1.  Assess for fall risk factors2.  Implement fall precautions  Outcome: Progressing       Patient is not progressing towards the following goals:

## 2025-03-31 NOTE — CONSULTS
INFECTIOUS DISEASES INPATIENT CONSULT NOTE     Date of Service:3/31/25    Consult Requested By: Oly Bush M.D.    Reason for Consultation: GAS endocarditis    History of Present Illness:   Beni Moore is a 54 y.o. diabetic male with ESRD admitted 3/28/2025 due to 1 week of progressively worsening left thigh pain secondary to an open, nonhealing wound with serosanguineous drainage  in the lateral aspect of his thigh.  As of this progress, he has been experiencing generalized malaise, fevers, chills.  He suspects of this wound appeared due to constant friction of his prosthetic leg secondary to constant adjustments due to persistent increase and decrease in size of his legs d/t volume overload.   Blood and wound cultures +group A strep  TTE showed vegetation MV  On ampicillin  Infectious Diseases consulted for antibiotic recommendation and management    Review Of Systems:  No fever     PMH:   Past Medical History:   Diagnosis Date    Anesthesia     Hypotension post-op, persisted for a few months x 1    Anesthesia 12/06/2023    Patient stated after 2016 colonoscopy he became hypotensive.    Bowel habit changes     History GI problems    Cataract     bilateral IOL    Deaf, right     Diabetes (HCC)     Oral medications & insulin-8/30/24 only insulin    Dialysis patient ([facility]) 08/29/2024    once a week on wednesday at [facility]    Heart burn     GERD; takes Prilosec    Hemorrhagic disorder ([facility])     ASA protection for stent and cardiac history    High cholesterol     medicated x 2    History of non-ST elevation myocardial infarction (NSTEMI)     Per Problem List    Hx of heart artery stent 2019    Hyperlipidemia     Hyperparathyroidism due to renal insufficiency ([facility])     Per Problem List    Hypertension     medicated    Myocardial infarct ([facility]) 2019    FOLLOWED BY DR TO    Pneumonia     H/O    Renal disorder 12/06/2023    Stage 3 CKD; 12/9/2024 dialysis at [facility] on Wednesday's only via CVC          PSH:  Past Surgical History:   Procedure Laterality Date    AV FISTULA REVISION Left 1/6/2025    Procedure: LEFT BRACHIOBASILIC FISTULA TRANSPOSITION;  Surgeon: Joyce Ureña M.D.;  Location: SURGERY SAME DAY [facility];  Service: General    AV FISTULA CREATION Left 11/04/2024    Procedure: CREATION OF LEFT UPPER EXTREMITY  DIALYSIS FISTULA;  Surgeon: Joyce Ureña M.D.;  Location: SURGERY SAME DAY [facility];  Service: General    ORIF, FRACTURE, HUMERUS, PROXIMAL Left 08/30/2024    Procedure: LEFT OPEN REDUCTION INTERNAL FIXATION  HUMERUS, PROXIMAL, NON UNION REPAIR;  Surgeon: Aris Pierre M.D.;  Location: [facility];  Service: Orthopedics    KNEE AMPUTATION BELOW Left 02/01/2024    Procedure: AMPUTATION, BELOW KNEE;  Surgeon: Celestino Segura M.D.;  Location: [facility];  Service: Orthopedics    TOE AMPUTATION Left 01/30/2024    Procedure: AMPUTATION, TOE- 5TH;  Surgeon: Celestino Segura M.D.;  Location: [facility];  Service: Orthopedics    PB OPEN TREATMENT PBOX HUMERAL FRACTURE Left 12/11/2023    Procedure: LEFT OPEN REDUCTION INTERNAL FIXATION  HUMERUS, PROXIMAL AND LEFT BICEPS TENODESIS;  Surgeon: Aris Pierre M.D.;  Location: [facility];  Service: Orthopedics    PB REPAIR BICEPS LONG TENDON Left 12/11/2023    Procedure: TENODESIS, BICEPS, OPEN;  Surgeon: Aris Pierre M.D.;  Location: [facility];  Service: Orthopedics    CATARACT EXTRACTION WITH IOL Bilateral 2023    TOE AMPUTATION Left 09/30/2021    Procedure: AMPUTATION, TOE;  Surgeon: Tomer Hart M.D.;  Location: [facility];  Service: Orthopedics    STENT PLACEMENT  2019    STEMI with nonobstructive LAD    TOE AMPUTATION Right 07/02/2018    Procedure: Transmetatarsal amputation;  Surgeon: Ravi Bernardo M.D.;  Location: [facility];  Service: Orthopedics    ACHILLES TENDON REPAIR Right 07/02/2018    Procedure: ACHILLES LENGTHENING;  Surgeon: Ravi  JESENIA Bernardo;  Location: SURGERY [facility];  Service: Orthopedics    IRRIGATION & DEBRIDEMENT ORTHO Right 2018    Procedure: IRRIGATION & DEBRIDEMENT ORTHO;  Surgeon: Ravi Bernardo M.D.;  Location: SURGERY [facility];  Service: Orthopedics    OTHER CARDIAC SURGERY  2019    Stent installed right side    OTHER ORTHOPEDIC SURGERY      SHOULDER SURGERY  2023    Something is not right in my shoulder/arm    TONSILLECTOMY      VASECTOMY         FAMILY HX:  Family History   Problem Relation Age of Onset    No Known Problems Mother     Diabetes Father         Adult onset    Heart Disease Father     Hypertension Father     Diabetes Maternal Grandmother     Heart Disease Maternal Grandfather     Lung Disease Paternal Grandmother     Cancer Paternal Grandmother     Cancer Paternal Grandfather     Lung Cancer Paternal Grandfather     Kidney Disease Neg Hx        SOCIAL HX:  Social History     Socioeconomic History    Marital status:      Spouse name: Not on file    Number of children: Not on file    Years of education: Not on file    Highest education level: Some college, no degree   Occupational History    Not on file   Tobacco Use    Smoking status: Former     Current packs/day: 0.00     Average packs/day: 0.6 packs/day for 17.0 years (10.0 ttl pk-yrs)     Types: Cigarettes     Start date: 1988     Quit date: 2002     Years since quittin.2     Passive exposure: Never    Smokeless tobacco: Never   Vaping Use    Vaping status: Never Used   Substance and Sexual Activity    Alcohol use: Not Currently     Comment: Quit     Drug use: Not Currently     Types: Oral     Comment: NOT CURRENTLY, HISTORY OF CBD    Sexual activity: Not Currently     Partners: Female   Other Topics Concern    Not on file   Social History Narrative    Not on file     Social Drivers of Health     Financial Resource Strain: Low Risk  (2022)    Overall Financial Resource Strain (CARDIA)     Difficulty  of Paying Living Expenses: Not hard at all   Food Insecurity: No Food Insecurity (3/28/2025)    Hunger Vital Sign     Worried About Running Out of Food in the Last Year: Never true     Ran Out of Food in the Last Year: Never true   Transportation Needs: No Transportation Needs (3/28/2025)    PRAPARE - Transportation     Lack of Transportation (Medical): No     Lack of Transportation (Non-Medical): No   Physical Activity: Insufficiently Active (2022)    Exercise Vital Sign     Days of Exercise per Week: 2 days     Minutes of Exercise per Session: 40 min   Stress: No Stress Concern Present (2022)    [employer] - Occupational Stress Questionnaire     Feeling of Stress : Not at all   Social Connections: Moderately Integrated (2022)    Social Connection and Isolation Panel [NHANES]     Frequency of Communication with Friends and Family: More than three times a week     Frequency of Social Gatherings with Friends and Family: More than three times a week     Attends Taoism Services: Never     Active Member of Clubs or Organizations: Yes     Attends Club or Organization Meetings: Never     Marital Status:    Intimate Partner Violence: Not At Risk (3/28/2025)    Humiliation, Afraid, Rape, and Kick questionnaire     Fear of Current or Ex-Partner: No     Emotionally Abused: No     Physically Abused: No     Sexually Abused: No   Housing Stability: Low Risk  (3/28/2025)    Housing Stability Vital Sign     Unable to Pay for Housing in the Last Year: No     Number of Times Moved in the Last Year: 0     Homeless in the Last Year: No     Social History     Tobacco Use   Smoking Status Former    Current packs/day: 0.00    Average packs/day: 0.6 packs/day for 17.0 years (10.0 ttl pk-yrs)    Types: Cigarettes    Start date: 1988    Quit date: 2002    Years since quittin.2    Passive exposure: Never   Smokeless Tobacco Never     Social History     Substance and Sexual  Activity   Alcohol Use Not Currently    Comment: Quit 2009       Allergies/Intolerances:  No Known Allergies      Other Current Medications:    Current Facility-Administered Medications:     ceFAZolin (Ancef) 2 g in  mL IVPB, 2 g, Intravenous, Q24HRS, Rosa Crockett M.D.    amLODIPine (Norvasc) tablet 5 mg, 5 mg, Oral, Q DAY, Melyssa Plascencia M.D., 5 mg at 03/31/25 0550    oxyCODONE immediate-release (Roxicodone) tablet 2.5 mg, 2.5 mg, Oral, Q6HRS PRN **OR** oxyCODONE immediate-release (Roxicodone) tablet 5 mg, 5 mg, Oral, Q6HRS PRN, 5 mg at 03/31/25 0159 **OR** HYDROmorphone (Dilaudid) injection 0.25 mg, 0.25 mg, Intravenous, Q4HRS PRN, Juan Carlos Greenberg DGLADYS, 0.25 mg at 03/30/25 7555    epoetin (Retacrit) 5,000 Units injection (Dialysis Use Only), 5,000 Units, Intravenous, MO, WE + FR, Melyssa Plascencia M.D.    insulin GLARGINE (Lantus,Semglee) injection, 18 Units, Subcutaneous, QHS, Newton Watson M.D.    aspirin EC tablet 81 mg, 81 mg, Oral, DAILY, Preeti Mark M.D., 81 mg at 03/31/25 0550    NS (Bolus) 0.9 % infusion 100 mL, 100 mL, Intravenous, DIALYSIS PRN, Melyssa Plascencia M.D.    heparin intracatheter (for DIALYSIS USE ONLY) 1,800 Units, 1,800 Units, Intracatheter, DIALYSIS PRN, Melyssa Plascencia M.D., 1,800 Units at 03/29/25 1317    heparin intracatheter (for DIALYSIS USE ONLY) 1,800 Units, 1,800 Units, Intracatheter, DIALYSIS PRN, Melyssa Plascencia M.D., 1,800 Units at 03/29/25 1317    atorvastatin (Lipitor) tablet 80 mg, 80 mg, Oral, Q EVENING, Newton Watson M.D., 80 mg at 03/30/25 2120    cholestyramine (Questran) 4 GM powder 4 g, 1 Packet, Oral, BID, Newton Watson M.D., 4 g at 03/30/25 1651    losartan (Cozaar) tablet 100 mg, 100 mg, Oral, DAILY, Newton Watson M.D., 100 mg at 03/31/25 0549    metOLazone (Zaroxolyn) tablet 5 mg, 5 mg, Oral, DAILY, Newton Watson M.D., 5 mg at 03/31/25 0549    torsemide (Demadex) tablet 100 mg, 100 mg, Oral, DAILY, Newton Watson M.D., 100 mg at 03/31/25  0550    omeprazole (PriLOSEC) capsule 20 mg, 20 mg, Oral, QAM, Newton Watson M.D., 20 mg at 25 0550    [Held by provider] heparin injection 5,000 Units, 5,000 Units, Subcutaneous, Q8HRS, Newton Watson M.D., 5,000 Units at 25 0503    labetalol (Normodyne/Trandate) injection 10 mg, 10 mg, Intravenous, Q4HRS PRN, Newton Watson M.D., 10 mg at 25 0624    acetaminophen (Tylenol) tablet 1,000 mg, 1,000 mg, Oral, Q6HRS PRN, Newton Watson M.D., 1,000 mg at 25 0321    insulin lispro (HumaLOG,AdmeLOG) subcutaneous injection, 1-6 Units, Subcutaneous, 4X/DAY ACHS, 1 Units at 256 **AND** POC blood glucose manual result, , , Q AC AND BEDTIME(S) **AND** NOTIFY MD and PharmD, , , Once **AND** Administer 20 grams of glucose (approximately 8 ounces of fruit juice) every 15 minutes PRN FSBG less than 70 mg/dL, , , PRN **AND** dextrose 50 % (D50W) injection 25 g, 25 g, Intravenous, Q15 MIN PRN, Newton Watson M.D.    Pharmacy Consult Request ...Pain Management Review 1 Each, 1 Each, Other, PHARMACY TO DOSE, Newton Watson M.D.    guaiFENesin dextromethorphan (Robitussin DM) 100-10 MG/5ML syrup 5 mL, 5 mL, Oral, Q6HRS PRN, Newton Watson M.D.      Most Recent Vital Signs:  BP (!) 188/79   Pulse 75   Temp 36.7 °C (98.1 °F) (Temporal)   Resp 18   Ht 1.829 m (6')   Wt 102 kg (224 lb 10.4 oz)   SpO2 94%   BMI 30.47 kg/m²   Temp  Av.9 °C (98.4 °F)  Min: 36.2 °C (97.2 °F)  Max: 38.1 °C (100.6 °F)    Physical Exam:  General:  no acute distress  HEENT: NCAT, PERRLA, sclera anicteric  Neck: supple, no lymphadenopathy  Chest: Unlabored. Right chest HD cath  Cardiac:   +murmur  Abdomen: non-distended  Extremities: Left BKA-pictures show necrotic wound knee with erythema and eschar  Right TMA  LUE AVF +thrill  Neuro: Alert and oriented times 3, Speech fluent CN intact PENNY  Psych: Mood normal Affect normal    Pertinent Lab Results:  Recent Labs     25  1940 25  0212  03/31/25  0156   WBC 6.9 6.2 6.8      Recent Labs     03/29/25  1940 03/30/25  0212 03/31/25  0156   HEMOGLOBIN 8.5* 8.1* 8.1*   HEMATOCRIT 26.4* 24.6* 25.4*   MCV 94.0 95.0 94.8   MCH 30.2 31.3 30.2   PLATELETCT 121* 128* 128*         Recent Labs     03/29/25  0817 03/30/25  0212 03/31/25  0156   SODIUM 132* 133* 131*   POTASSIUM 4.3 4.2 4.2   CHLORIDE 96 97 96   CO2 22 26 22   CREATININE 7.76* 5.84* 7.23*        Recent Labs     03/29/25  0817 03/30/25 0212 03/31/25  0156   ALBUMIN 3.1* 3.1* 3.0*        Pertinent Micro:  Results       Procedure Component Value Units Date/Time    E-Test [[id number]] Collected: 03/28/25 1632    Order Status: Completed Specimen: Other Updated: 03/31/25 0731     ETEST Sensitivity INTERIM    Narrative:      [facility] tel. [phone] 03/29/2025, 12:59, RB PERF. RESULTS CALLED TO: Frida HENAO  [id number] for Group A Strep  [facility] tel. [phone] 03/29/2025, 10:55, RB PERF. RESULTS CALLED TO: [id number]    Blood Culture - Draw one from central line and one from peripheral site [[id number]]  (Abnormal)  (Susceptibility) Collected: 03/28/25 1632    Order Status: Completed Specimen: Blood from Line Updated: 03/31/25 0730     Significant Indicator POS     Source BLD     Site Peripheral     Culture Result Growth detected by automated blood culture system. 03/29/2025  10:55  Gram Stain: Gram positive cocci in pairs/chains.        Streptococcus pyogenes (Group A)  Identification performed by Qitio. Confirmatory testing  and susceptibilities to follow if indicated.      Susceptibility       Streptococcus pyogenes (group a) (1)       Antibiotic Interpretation Microscan   Method Status    Penicillin Sensitive 0.016 mcg/mL E-TEST Preliminary    Cefotaxime Sensitive 0.012 mcg/mL E-TEST Preliminary    Erythromycin Sensitive 0.125 mcg/mL E-TEST Preliminary                       Culture Wound w/Gram Stain [[id number]]  (Abnormal) Collected: 03/29/25 1130    Order Status: Completed Specimen: Wound from Left Leg Updated:  03/30/25 0838     Significant Indicator POS     Source WND     Site LEFT LEG     Culture Result -     Gram Stain Result No organisms seen.     Culture Result Streptococcus pyogenes (Group A)  Moderate growth      Urinalysis [[id number]]  (Abnormal) Collected: 03/30/25 0532    Order Status: Completed Specimen: Urine Updated: 03/30/25 0635     Color Yellow     Character Clear     Specific Gravity 1.018     Ph 8.0     Glucose 250 mg/dL      Ketones Negative mg/dL      Protein >=1000 mg/dL      Bilirubin Negative     Urobilinogen, Urine 0.2 EU/dL      Nitrite Negative     Leukocyte Esterase Negative     Occult Blood Small     Micro Urine Req Microscopic    Urine Culture (New) [[id number]] Collected: 03/30/25 0532    Order Status: Sent Specimen: Urine Updated: 03/30/25 0610    GRAM STAIN [[id number]] Collected: 03/29/25 1130    Order Status: Completed Specimen: Wound Updated: 03/29/25 1931     Significant Indicator .     Source WND     Site LEFT LEG     Gram Stain Result No organisms seen.    MRSA By PCR (Amp) [[id number]] Collected: 03/28/25 2041    Order Status: Completed Specimen: Respirate from Nares Updated: 03/29/25 0008     MRSA by PCR Negative    Blood Culture - Draw one from central line and one from peripheral site [[id number]] Collected: 03/28/25 1632    Order Status: Completed Specimen: Blood from Peripheral Updated: 03/28/25 1808     Significant Indicator NEG     Source BLD     Site PERIPHERAL     Culture Result No Growth  Note: Blood cultures are incubated for 5 days and  are monitored continuously.Positive blood cultures  are called to the RN and reported as soon as  they are identified.            Blood Culture   Date Value Ref Range Status   06/30/2018 No growth after 5 days of incubation.  Final        Studies:  Compared to the prior study on 1/10/24, concern for native valve endocarditis, development of mitral regurgitation, tricuspid regurgitation.   Normal left ventricular chamber size. Mild concentric  left ventricular   hypertrophy. Normal left ventricular systolic function. The ejection fraction is measured to be 55 % by Verma's biplane. Normal regional   wall motion. Indeterminate diastolic function.  Moderately dilated left atrium  Moderate mitral regurgitation  Mobile echodensity on the left coronary leaflet suggestive of valvular vegetation  Moderate tricuspid regurgitation with estimated RV systolic pressure of 36 mmHg.  IMPRESSION:   Group A strep bacteremia  Group A strep cellulitis with necrotic skin lesions  Abnormal MACKENZIE cw AV endocarditis  -MV and TV abnormal cannot r/o infection  ESRD  Thrombocytopenia    PLAN:   Wound care  DC ampicillin  Start cefazolin-ESRD dosing. Can be dosed after dialysis  BCxs + strep group A, not a typical cause of endocarditis  Repeat Bcxs every 48 hours until neg  DC HD cath if feasible-has used AVF for dialysis  CTS eval  MACKENZIE if persistently + blood cultures  Anticipate 4 weeks IV abx from date of negative blood cxs        Plan of care discussed with LEILA Bush M.D.. Will continue to follow    Rosa Crockett M.D.

## 2025-03-31 NOTE — CARE PLAN
The patient is Stable - Low risk of patient condition declining or worsening    Shift Goals  Clinical Goals: pain management; IV antibiotic; wound care  Patient Goals: pain management; rest  Family Goals: JENNIFER    Progress made toward(s) clinical / shift goals:    Problem: Knowledge Deficit - Standard  Goal: Patient and family/care givers will demonstrate understanding of plan of care, disease process/condition, diagnostic tests and medications  Outcome: Progressing  Patient refusing laxatives this AM.     Problem: Hemodynamics  Goal: Patient's hemodynamics, fluid balance and neurologic status will be stable or improve  Outcome: Progressing

## 2025-03-31 NOTE — DISCHARGE PLANNING
Care Transition Team Assessment    LSW met with pt at bedside and chart review completed. Pt verified the information on the face sheet. Pt lives alone in a single story home with no steps to enter. Prior to this hospitalization pt was independent with all ADLs/IADLs. Pt has a wheelchair at home which he intends to use while he's unable to wear his prosthetic. Pt reported he goes to dialysis Mondays ([id number]) and Wednesdays (1377-1075) at [facility]. Pt typically drives himself to and from dialysis. Pt has good DC support and intends to stay with his mom in [place] for a short time after discharging. No financial, SA, or MH concerns at this time. Pt does not have any ACP documents and declined AD packet at this time. Pt confirmed he will have a ride home upon DC.    Pt has previously been on service with [facility] and would be agreeable to the same [facility] agency if needed.    Information Source  Orientation Level: Oriented X4  Information Given By: Patient  Informant's Name: Beni Moore  Who is responsible for making decisions for patient? : Patient    Readmission Evaluation  Is this a readmission?: No    Elopement Risk  Legal Hold: No  Ambulatory or Self Mobile in Wheelchair: No-Not an Elopement Risk  Elopement Risk: Not at Risk for Elopement    Interdisciplinary Discharge Planning  Lives with - Patient's Self Care Capacity: Alone and Able to Care For Self  Patient or legal guardian wants to designate a caregiver: No  Support Systems: Family Member(s)  Housing / Facility: 1 Story House  Prior Services: None    Discharge Preparedness  What is your plan after discharge?: Home with help  What are your discharge supports?: Child, Parent  Prior Functional Level: Independent with Activities of Daily Living, Drives Self, Independent with Medication Management  Difficulity with ADLs: None  Difficulity with IADLs: None    Functional Assesment  Prior Functional Level: Independent with Activities of Daily Living,  Drives Self, Independent with Medication Management    Finances  Financial Barriers to Discharge: No    Vision / Hearing Impairment  Vision Impairment : No  Hearing Impairment : Yes  Hearing Impairment: Both Ears, Hearing Device Not Available  Does Pt Need Special Equipment for the Hearing Impaired?: No    Advance Directive  Advance Directive?: None  Advance Directive offered?: AD Booklet refused    Domestic Abuse  Have you ever been the victim of abuse or violence?: No  Possible Abuse/Neglect Reported to:: Not Applicable    Psychological Assessment  History of Substance Abuse: None  History of Psychiatric Problems: No  Non-compliant with Treatment: No  Newly Diagnosed Illness: No    Discharge Risks or Barriers  Discharge risks or barriers?: Lives alone, no community support  Patient risk factors: Lives alone and no community support, Cognitive / sensory / physical deficit    Anticipated Discharge Information  Discharge Disposition: Discharged to home/self care (01)  Discharge Address: [facility] [place] [address]  Discharge Contact Phone Number: [phone]

## 2025-03-31 NOTE — Clinical Note
REFERRAL APPROVAL NOTICE         Sent on March 31, 2025                   Beni Moore  [address] [facility] [place] [address]                   Dear Mr. Moore,    After a careful review of the medical information and benefit coverage, [facility] has processed your referral. See below for additional details.    If applicable, you must be actively enrolled with your insurance for coverage of the authorized service. If you have any questions regarding your coverage, please contact your insurance directly.    REFERRAL INFORMATION   Referral #:  [id number]  Referred-To Department    Referred-By Provider:  Wound Clinic    Juan Carlos Greenberg D.O.   [facility]      [address]  [facility] [address]  [phone] [address]  [facility] [address]  [phone]    Referral Start Date:  03/29/2025  Referral End Date:   03/29/2026             SCHEDULING  If you do not already have an appointment, please call [phone] to make an appointment.     MORE INFORMATION  If you do not already have a [employer] account, sign up at: [email or web address]  You can access your medical information, make appointments, see lab results, billing information, and more.  If you have questions regarding this referral, please contact  the [facility] Referrals department at:             [phone]. Monday - Friday 8:00AM - 5:00PM.     Sincerely,    [facility]

## 2025-03-31 NOTE — ASSESSMENT & PLAN NOTE
Likely secondary to group A streptococcus.  Mobile density noted on the left cusp of aortic valve.  -Cardiology consulted; appreciate recommendations.  -Antibiotics changed to Ancef per infectious disease, also consulting.  Coordinating with nephrology for antibiotic access and care in regards to dialysis.

## 2025-03-31 NOTE — PROGRESS NOTES
[facility] INTERNAL MEDICINE PROGRESS NOTE - STUDENT NOTE     Attending:   Dr. Oly Bush MD    Senior Resident:  Dr. Dionicio DO    Resident:   Dr. Niles Ordonez MD    PATIENT:   Beni Moore; [id number]; 1970    CC: L. Calf diabetic foot with cellulitis, group A bacteremia.    HOSPITAL COURSE:  Beni Moore is a 54 y.o. male with PMH of IDDM w/diabetic enteropathy-related diarrhea, ESRD on ED, Hypertension, Dyslipidemia, s/p left BKA, s/p right transmetatarsal amputation, CAD s/p stent.      Presents 03/28/2025 due to 1 week of progressively worsening left thigh pain secondary to an open, nonhealing wound with serosanguineous drainage  in the lateral aspect of his thigh.  As of this progress, he has been experiencing generalized malaise, fevers, chills.  He suspects of this wound appeared due to constant friction of his prosthetic leg secondary to constant adjustments due to persistent increase and decrease in size of his legs d/t volume overload.     INTERVAL UPDATES  No acute events overnight, feeling well. Reports shortness of breath and uses O2 in room. Other than that, denies fever, chills, chest pain, abdominal pain, N/V/D.    Review of Systems   Constitutional:  Negative for chills and fever.   Respiratory:  Positive for shortness of breath.    Cardiovascular:  Negative for chest pain.   Gastrointestinal:  Negative for abdominal pain, diarrhea, nausea and vomiting.   Uses O2 in room as needed.     OBJECTIVE:  Vitals:    03/31/25 0512 03/31/25 0514 03/31/25 0901 03/31/25 1148   BP: (!) 151/98  (!) 188/79 (!) 188/88   Pulse: 74  75 74   Resp: 19  18 17   Temp: 36.5 °C (97.7 °F)  36.7 °C (98.1 °F) 36.4 °C (97.5 °F)   TempSrc: Temporal  Temporal Temporal   SpO2: 98%  94% 92%   Weight:  102 kg (224 lb 10.4 oz)     Height:           Intake/Output Summary (Last 24 hours) at 3/31/2025 1353  Last data filed at 3/31/2025 1200  Gross per 24 hour   Intake 1050 ml   Output 850 ml   Net 200 ml       PHYSICAL EXAM:    General: No acute distress, afebrile, resting comfortably, conversational   HEENT: NC/AT. EOMI.   Cardiovascular: RRR without murmurs, rubs, heaves. Normal capillary refill   Respiratory: CTAB, no tachypnea or retractions   Abdomen: normal bowel sounds, soft, nontender, nondistended, no masses, no organomegaly   EXT:  R foot amputation down to PIPs, L BKA, bandages clean, dry, and intact  Skin: Erythema noted in L knee with bandages intact, tender to palpation  Neuro: Non-focal, alert and orientated       LABS:  Recent Labs     03/28/25  1607 03/29/25  0817 03/29/25  1940 03/30/25  0212 03/31/25  0156   WBC 12.5* 8.6 6.9 6.2 6.8   RBC 2.42* 2.42* 2.81* 2.59* 2.68*   HEMOGLOBIN 7.4* 7.3* 8.5* 8.1* 8.1*   HEMATOCRIT 22.9* 23.3* 26.4* 24.6* 25.4*   MCV 94.6 96.3 94.0 95.0 94.8   MCH 30.6 30.2 30.2 31.3 30.2   RDW 43.4 47.3 47.8 49.1 47.5   PLATELETCT 150* 118* 121* 128* 128*   MPV 10.9 10.1 10.5 10.8 10.6   NEUTSPOLYS 86.80* 73.60*  --   --   --    LYMPHOCYTES 5.20* 14.60*  --   --   --    MONOCYTES 6.60 10.00  --   --   --    EOSINOPHILS 0.20 0.50  --   --   --    BASOPHILS 0.40 0.60  --   --   --      Recent Labs     03/28/25  1607 03/29/25  0817 03/30/25 0212 03/31/25  0156   SODIUM 132* 132* 133* 131*   POTASSIUM 4.3 4.3 4.2 4.2   CHLORIDE 94* 96 97 96   CO2 23 22 26 22   BUN 51* 57* 38* 50*   CREATININE 6.48* 7.76* 5.84* 7.23*   CALCIUM 8.3* 7.7* 8.0* 7.7*   MAGNESIUM 1.6  --   --   --    ALBUMIN 3.8 3.1* 3.1* 3.0*     Estimated GFR/CRCL = Estimated Creatinine Clearance: 14.1 mL/min (A) (by C-G formula based on SCr of 7.23 mg/dL (HH)).  Recent Labs     03/29/25  0817 03/30/25 0212 03/31/25  0156   GLUCOSE 92 109* 160*     Recent Labs     03/28/25  1607 03/29/25  0817 03/30/25 0212 03/31/25  0156   ASTSGOT 19 14 16 15   ALTSGPT 9 <5 8 <5   TBILIRUBIN 1.2 1.6* 1.6* 1.0   ALKPHOSPHAT 58 47 69 50   GLOBULIN 3.2 3.0 3.1 3.0   INR 1.20*  --   --   --              Recent Labs     03/28/25  1607   INR 1.20*        MICROBIOLOGY:   Blood Culture   Date Value Ref Range Status   06/30/2018 No growth after 5 days of incubation.  Final        IMAGING:   EC-ECHOCARDIOGRAM COMPLETE W/O CONT   Final Result      CT-LOWER EXTREMITY UNILATERAL WITH CONTRAST LEFT   Final Result         1. Subcutaneous edema. This may be seen with cellulitis.   2. No drainable abscess is evident.   3. Below the knee amputation without evidence for osteomyelitis.      DX-CHEST-PORTABLE (1 VIEW)   Final Result      1.  Right lower lobe atelectasis and/or pneumonitis..          CULTURES:   Results       Procedure Component Value Units Date/Time    BLOOD CULTURE [[id number]] Collected: 03/31/25 1255    Order Status: Sent Specimen: Blood from Peripheral Updated: 03/31/25 1311    BLOOD CULTURE [[id number]] Collected: 03/31/25 1257    Order Status: Sent Specimen: Blood from Peripheral Updated: 03/31/25 1311    Culture Wound w/Gram Stain [[id number]]  (Abnormal) Collected: 03/29/25 1130    Order Status: Completed Specimen: Wound from Left Leg Updated: 03/31/25 1034     Significant Indicator POS     Source WND     Site LEFT LEG     Culture Result -     Gram Stain Result No organisms seen.     Culture Result Streptococcus pyogenes (Group A)  Moderate growth      E-Test [[id number]] Collected: 03/28/25 1632    Order Status: Completed Specimen: Other Updated: 03/31/25 0731     ETEST Sensitivity INTERIM    Narrative:      [facility] tel. [phone] 03/29/2025, 12:59, RB PERF. RESULTS CALLED TO: Frida HENAO  [id number] for Group A Strep  [facility] tel. [phone] 03/29/2025, 10:55, RB PERF. RESULTS CALLED TO: [id number]    Blood Culture - Draw one from central line and one from peripheral site [[id number]]  (Abnormal)  (Susceptibility) Collected: 03/28/25 1632    Order Status: Completed Specimen: Blood from Line Updated: 03/31/25 0730     Significant Indicator POS     Source BLD     Site Peripheral     Culture Result Growth detected by automated blood culture system. 03/29/2025  10:55  Gram  Stain: Gram positive cocci in pairs/chains.        Streptococcus pyogenes (Group A)  Identification performed by [facility]. Confirmatory testing  and susceptibilities to follow if indicated.      Susceptibility       Streptococcus pyogenes (group a) (1)       Antibiotic Interpretation Microscan   Method Status    Penicillin Sensitive 0.016 mcg/mL E-TEST Preliminary    Cefotaxime Sensitive 0.012 mcg/mL E-TEST Preliminary    Erythromycin Sensitive 0.125 mcg/mL E-TEST Preliminary                       Urinalysis [[id number]]  (Abnormal) Collected: 03/30/25 0532    Order Status: Completed Specimen: Urine Updated: 03/30/25 0635     Color Yellow     Character Clear     Specific Gravity 1.018     Ph 8.0     Glucose 250 mg/dL      Ketones Negative mg/dL      Protein >=1000 mg/dL      Bilirubin Negative     Urobilinogen, Urine 0.2 EU/dL      Nitrite Negative     Leukocyte Esterase Negative     Occult Blood Small     Micro Urine Req Microscopic    Urine Culture (New) [[id number]] Collected: 03/30/25 0532    Order Status: Sent Specimen: Urine Updated: 03/30/25 0610    GRAM STAIN [[id number]] Collected: 03/29/25 1130    Order Status: Completed Specimen: Wound Updated: 03/29/25 1931     Significant Indicator .     Source WND     Site LEFT LEG     Gram Stain Result No organisms seen.    MRSA By PCR (Amp) [[id number]] Collected: 03/28/25 2041    Order Status: Completed Specimen: Respirate from Nares Updated: 03/29/25 0008     MRSA by PCR Negative    Blood Culture - Draw one from central line and one from peripheral site [[id number]] Collected: 03/28/25 1632    Order Status: Completed Specimen: Blood from Peripheral Updated: 03/28/25 1808     Significant Indicator NEG     Source BLD     Site PERIPHERAL     Culture Result No Growth  Note: Blood cultures are incubated for 5 days and  are monitored continuously.Positive blood cultures  are called to the RN and reported as soon as  they are identified.              MEDS:  Current  Facility-Administered Medications   Medication Last Admin    ceFAZolin in dextrose (Ancef) IVPB premix 1 g      amLODIPine (Norvasc) tablet 5 mg 5 mg at 03/31/25 0550    oxyCODONE immediate-release (Roxicodone) tablet 2.5 mg      Or    oxyCODONE immediate-release (Roxicodone) tablet 5 mg 5 mg at 03/31/25 0159    Or    HYDROmorphone (Dilaudid) injection 0.25 mg 0.25 mg at 03/30/25 2255    epoetin (Retacrit) 5,000 Units injection (Dialysis Use Only)      insulin GLARGINE (Lantus,Semglee) injection      aspirin EC tablet 81 mg 81 mg at 03/31/25 0550    NS (Bolus) 0.9 % infusion 100 mL      heparin intracatheter (for DIALYSIS USE ONLY) 1,800 Units 1,800 Units at 03/29/25 1317    heparin intracatheter (for DIALYSIS USE ONLY) 1,800 Units 1,800 Units at 03/29/25 1317    atorvastatin (Lipitor) tablet 80 mg 80 mg at 03/30/25 2120    cholestyramine (Questran) 4 GM powder 4 g 4 g at 03/31/25 1027    losartan (Cozaar) tablet 100 mg 100 mg at 03/31/25 0549    metOLazone (Zaroxolyn) tablet 5 mg 5 mg at 03/31/25 0549    torsemide (Demadex) tablet 100 mg 100 mg at 03/31/25 0550    omeprazole (PriLOSEC) capsule 20 mg 20 mg at 03/31/25 0550    [Held by provider] heparin injection 5,000 Units 5,000 Units at 03/29/25 0503    labetalol (Normodyne/Trandate) injection 10 mg 10 mg at 03/31/25 1207    acetaminophen (Tylenol) tablet 1,000 mg 1,000 mg at 03/29/25 0321    insulin lispro (HumaLOG,AdmeLOG) subcutaneous injection 1 Units at 03/30/25 2126    And    dextrose 50 % (D50W) injection 25 g      Pharmacy Consult Request ...Pain Management Review 1 Each      guaiFENesin dextromethorphan (Robitussin DM) 100-10 MG/5ML syrup 5 mL         ASSESSMENT/PLAN: 54 y.o. male admitted for sepsis from L calf diabetic foot cellulitis, group A bacteremia.     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 Vegetation leaflet on L coronary side aortic valve.  -Possibly replace dialysis line for new one or use picc line / fistula for dialysis and new medications    Cellulitis  Assessment & Plan  -ID to f/u on abx regimen; switch to Ancef  -Wound care to see patient and change dressings    ESRD  Assessment & Plan  -Possibly new picc line placement for dialysis  -M/W/F dialysis recommended from nephrology for ESRD, which is changed from just the M/W scheduling  -Sees Dr. Merrill for nephrology  -Touch base with Dr. Merrill for picc line replacement or original picc line use for dialysis  -Monitor for changes    HTN  Assessment & Plan  -Continue Losartan, Torosemide, Metalazone, Amlodipine  -No signs of end organ damage  -Monitor for changes, on tele for cardiac changes      Core Measures:   Abx: Ampicillin  DVT prophylaxis:   Code Status: Full code     Disposition:   Rl Saleh, Student MS3

## 2025-03-31 NOTE — PROGRESS NOTES
Patient is a 83y old  Male who presents with a chief complaint of s/p fall, ? syncope , Oxygen dependent at home, asbestosis, s/p CABG, Stent, on effient., elevated troponin (2017 06:51)  s/p fll. l4 compression fx. . pain controlled with Tylenol. . patient states, he felt dizzy and fell at home. . EKg shows sinus rhythm with 1 degree Av Block, , RBBB, LAHB, LAE.  hx of CABG, failled stent. Cardiologist stopped Effient long time ago.    INTERVAL HPI/OVERNIGHT EVENTS: uneventful, elevated troponin. Denies any chest pain    MEDICATIONS  (STANDING):  aspirin enteric coated 81 milliGRAM(s) Oral daily  atorvastatin 20 milliGRAM(s) Oral at bedtime  dextrose 5%. 1000 milliLiter(s) (50 mL/Hr) IV Continuous <Continuous>  dextrose 50% Injectable 12.5 Gram(s) IV Push once  dextrose 50% Injectable 25 Gram(s) IV Push once  dextrose 50% Injectable 25 Gram(s) IV Push once  enoxaparin Injectable 40 milliGRAM(s) SubCutaneous every 24 hours  lisinopril 5 milliGRAM(s) Oral daily  metFORMIN 500 milliGRAM(s) Oral daily  pantoprazole    Tablet 40 milliGRAM(s) Oral before breakfast    MEDICATIONS  (PRN):  acetaminophen   Tablet. 650 milliGRAM(s) Oral every 6 hours PRN Mild Pain (1 - 3)  dextrose Gel 1 Dose(s) Oral once PRN Blood Glucose LESS THAN 70 milliGRAM(s)/deciliter  glucagon  Injectable 1 milliGRAM(s) IntraMuscular once PRN Glucose LESS THAN 70 milligrams/deciliter      Allergies    No Known Allergies    Intolerances        REVIEW OF SYSTEMS:  CONSTITUTIONAL: No fever, weight loss, or fatigue  EYES: No eye pain, visual disturbances, or discharge  ENMT:  No difficulty hearing, tinnitus, vertigo; No sinus or throat pain  NECK: No pain or stiffness  BREASTS: No pain, masses, or nipple discharge  RESPIRATORY: No cough, wheezing, chills or hemoptysis; No shortness of breath  CARDIOVASCULAR: No chest pain, palpitations, dizziness, or leg swelling  GASTROINTESTINAL: No abdominal or epigastric pain. No nausea, vomiting, or hematemesis; No diarrhea or constipation. No melena or hematochezia.  GENITOURINARY: No dysuria, frequency, hematuria, or incontinence  NEUROLOGICAL: No headaches, memory loss, loss of strength, numbness, or tremors  SKIN: No itching, burning, rashes, or lesions   LYMPH NODES: No enlarged glands  ENDOCRINE: No heat or cold intolerance; No hair loss  MUSCULOSKELETAL: No joint pain or swelling; No muscle, back, or extremity pain  PSYCHIATRIC: No depression, anxiety, mood swings, or difficulty sleeping  HEME/LYMPH: No easy bruising, or bleeding gums  ALLERY AND IMMUNOLOGIC: No hives or eczema    Vital Signs Last 24 Hrs  T(C): 36.8 (2017 05:35), Max: 37.6 (2017 04:03)  T(F): 98.2 (2017 05:35), Max: 99.6 (2017 04:03)  HR: 79 (2017 05:35) (75 - 92)  BP: 122/65 (2017 05:35) (122/65 - 156/60)  BP(mean): --  RR: 18 (2017 05:35) (16 - 22)  SpO2: 94% (2017 05:35) (93% - 96%)    PHYSICAL EXAM:  GENERAL: NAD, well-groomed, well-developed  HEAD:  Atraumatic, Normocephalic  EYES: EOMI, PERRLA, conjunctiva and sclera clear  ENMT: No tonsillar erythema, exudates, or enlargement; Moist mucous membranes, Good dentition, No lesions  NECK: Supple, No JVD, Normal thyroid  NERVOUS SYSTEM:  Alert & Oriented X3, Good concentration; Motor Strength 5/5 B/L upper and lower extremities; DTRs 2+ intact and symmetric  CHEST/LUNG: Clear to percussion bilaterally; No rales, rhonchi, wheezing, or rubs  HEART: Regular rate and rhythm; No murmurs, rubs, or gallops  ABDOMEN: Soft, Nontender, Nondistended; Bowel sounds present  EXTREMITIES:  2+ Peripheral Pulses, No clubbing, cyanosis, or edema  LYMPH: No lymphadenopathy noted  SKIN: No rashes or lesions    LABS:                        12.9   8.2   )-----------( 164      ( 2017 20:34 )             41.6     11-    132<L>  |  88<L>  |  14  ----------------------------<  115<H>  4.6   |  37<H>  |  0.45<L>    Ca    8.1<L>      2017 20:34    TPro  7.2  /  Alb  3.2<L>  /  TBili  1.5<H>  /  DBili  x   /  AST  19  /  ALT  14  /  AlkPhos  45  11-      PT/INR - ( 2017 20:34 )   PT: 13.1 sec;   INR: 1.20 ratio         PTT - ( 2017 20:34 )  PTT:30.2 sec  Urinalysis Basic - ( 2017 20:34 )    Color: Yellow / Appearance: Clear / S.010 / pH: x  Gluc: x / Ketone: Negative  / Bili: Negative / Urobili: Negative mg/dL   Blood: x / Protein: Negative mg/dL / Nitrite: Negative   Leuk Esterase: Negative / RBC: x / WBC x   Sq Epi: x / Non Sq Epi: x / Bacteria: x      CAPILLARY BLOOD GLUCOSE      POCT Blood Glucose.: 129 mg/dL (2017 07:59)  POCT Blood Glucose.: 204 mg/dL (2017 03:09)      CARDIAC MARKERS ( 2017 03:17 )  .531 ng/mL / x     / x     / x     / x      CARDIAC MARKERS ( 2017 20:34 )  .035 ng/mL / x     / x     / x     / x                RADIOLOGY & ADDITIONAL TESTS:    Imaging Personally Reviewed:  [ ] YES  [ ] NO    Consultant(s) Notes Reviewed:  [ ] YES  [ ] NO    Care Discussed with Consultants/Other Providers [ ] YES  [ ] NO    Care discussed with family,         [  ]   yes  [  ]  No    imp:    stable[ ]    unstable[  ]     improving [   ]       unchanged  [  ]                Plans:  Continue present plans  [  ]               New consult [  ]   specialty  .......               order test[  ]    test name.                  Discharge Planning  [  ] [facility] Daily Progress Note    Date of Service  3/31/2025    [facility] Team: [facility] Purple Team   Attending: Oly Bush M.d.  Senior Resident: Dr. Juan Carlos Greenberg  Intern:  Dr. Preeti Mark  Contact Number: [phone]    Chief Complaint  Beni Moore is a 54 y.o. male admitted 3/28/2025 with left leg cellulitis, group A streptococcal bacteremia    Hospital Course  Beni Moore is a 54 y.o. male admitted 03/28/2025 for left calf diabetic foot with cellulitis. He has a PMHx of IDDM w/diabetic enteropathy-related diarrhea, ESRD on ED, Hypertension, Dyslipidemia, s/p left BKA, s/p right transmetatarsal amputation, CAD s/p stent.   He was found to have a left lower extremity cellulitis, and have group a streptococcal bacteremia.  Antibiotics were narrowed to ampicillin however echocardiogram showing new valvular pathology especially tricuspid regurgitation, mitral regurgitation, and new mobile echodensity of aortic valve consistent with endocarditis.  Cardiology and infectious disease have been now consulted.  Antibiotics escalated to Ancef, appreciate recommendations from cardiology regarding aortic valvular pathology.      Interval Problem Update  No acute events overnight.  Noted cellulitis of left lower extremity appears trace at this time.  Results overnight showing new abnormalities suggesting endocarditis.  Consulting infectious disease, cardiology.  Coordinating with nephrology for further care.        I have discussed this patient's plan of care and discharge plan at IDT rounds today with Case Management, Nursing, Nursing leadership, and other members of the IDT team.    Consultants/Specialty  nephrology    Code Status  Full Code    Disposition  The patient is not medically cleared for discharge to home or a post-acute facility.  Anticipate discharge to: home with close outpatient follow-up    I have placed the appropriate orders for post-discharge needs.    Review of Systems  Review of  PATIENT COMFORTABLE AND OFFERS NO COMPLAINTS  TELEMETRY: NORMAL SINUS RHYTHM; NO DYSRHYTHMIA   TROPONINS NOTED.  CLEAR LUNGS  SOFT HEART SOUNDS  NO EDEMA  ECHO: LVH NORMAL EF  ? SIGNIFICANCE OF TROPONINS? WILL ADD LOW DOSE BETA BLOCKER FOR ISCHEMIC COMPONENT. GIVEN LACK SYMPTOMS WILL CONTINUE TO OBSERVE; FOLLOW UP ECG IN AM    MELODY MALDONADO MD, FACC Patient is a 83y old  Male who presents with a chief complaint of s/p fall, ? syncope , Oxygen dependent at home, asbestosis, s/p CABG, Stent,  not on effient.( discontinued by  cardiologist), elevated troponin (03 Nov 2017 10:28)  elevated troponin non specific as per crdiologist. , seconday to lung disease    INTERVAL HPI/OVERNIGHT EVENTS: complINED OF CHEST PAIN THIS AM. Had ekg repeated. Not changed compared to old EKGs. Now asymptomatic. No chest pain No dyspma.    MEDICATIONS  (STANDING):  aspirin enteric coated 81 milliGRAM(s) Oral daily  atorvastatin 20 milliGRAM(s) Oral at bedtime  dextrose 5%. 1000 milliLiter(s) (50 mL/Hr) IV Continuous <Continuous>  dextrose 50% Injectable 12.5 Gram(s) IV Push once  dextrose 50% Injectable 25 Gram(s) IV Push once  dextrose 50% Injectable 25 Gram(s) IV Push once  enoxaparin Injectable 40 milliGRAM(s) SubCutaneous every 24 hours  lisinopril 5 milliGRAM(s) Oral daily  metFORMIN 500 milliGRAM(s) Oral daily  metoprolol succinate ER 25 milliGRAM(s) Oral daily  pantoprazole    Tablet 40 milliGRAM(s) Oral before breakfast    MEDICATIONS  (PRN):  acetaminophen   Tablet. 650 milliGRAM(s) Oral every 6 hours PRN Mild Pain (1 - 3)  dextrose Gel 1 Dose(s) Oral once PRN Blood Glucose LESS THAN 70 milliGRAM(s)/deciliter  glucagon  Injectable 1 milliGRAM(s) IntraMuscular once PRN Glucose LESS THAN 70 milligrams/deciliter      Allergies    No Known Allergies    Intolerances        REVIEW OF SYSTEMS:  CONSTITUTIONAL: No fever, weight loss, or fatigue  EYES: No eye pain, visual disturbances, or discharge  ENMT:  No difficulty hearing, tinnitus, vertigo; No sinus or throat pain  NECK: No pain or stiffness  BREASTS: No pain, masses, or nipple discharge  RESPIRATORY: No cough, wheezing, chills or hemoptysis; No shortness of breath  CARDIOVASCULAR: No chest pain, palpitations, dizziness, or leg swelling  GASTROINTESTINAL: No abdominal or epigastric pain. No nausea, vomiting, or hematemesis; No diarrhea or constipation. No melena or hematochezia.  GENITOURINARY: No dysuria, frequency, hematuria, or incontinence  NEUROLOGICAL: No headaches, memory loss, loss of strength, numbness, or tremors  SKIN: No itching, burning, rashes, or lesions   LYMPH NODES: No enlarged glands  ENDOCRINE: No heat or cold intolerance; No hair loss  MUSCULOSKELETAL: No joint pain or swelling; No muscle, back, or extremity pain  PSYCHIATRIC: No depression, anxiety, mood swings, or difficulty sleeping  HEME/LYMPH: No easy bruising, or bleeding gums  ALLERY AND IMMUNOLOGIC: No hives or eczema    Vital Signs Last 24 Hrs  T(C): 36.6 (04 Nov 2017 04:43), Max: 37.7 (04 Nov 2017 00:16)  T(F): 97.8 (04 Nov 2017 04:43), Max: 99.8 (04 Nov 2017 00:16)  HR: 88 (04 Nov 2017 04:43) (74 - 91)  BP: 141/54 (04 Nov 2017 04:43) (110/54 - 141/54)  BP(mean): --  RR: 16 (04 Nov 2017 04:43) (16 - 20)  SpO2: 100% (04 Nov 2017 00:16) (88% - 100%)    PHYSICAL EXAM:  GENERAL: NAD, well-groomed, well-developed  HEAD:  Atraumatic, Normocephalic  EYES: EOMI, PERRLA, conjunctiva and sclera clear  ENMT: No tonsillar erythema, exudates, or enlargement; Moist mucous membranes, Good dentition, No lesions  NECK: Supple, No JVD, Normal thyroid  NERVOUS SYSTEM:  Alert & Oriented X3, Good concentration; Motor Strength 5/5 B/L upper and lower extremities; DTRs 2+ intact and symmetric  CHEST/LUNG: Clear to percussion bilaterally; No rales, rhonchi, wheezing, or rubs  HEART: Regular rate and rhythm; No murmurs, rubs, or gallops  ABDOMEN: Soft, Nontender, Nondistended; Bowel sounds present  EXTREMITIES:  2+ Peripheral Pulses, No clubbing, cyanosis, or edema  LYMPH: No lymphadenopathy noted  SKIN: No rashes or lesions    LABS:                        12.6   8.2   )-----------( 146      ( 03 Nov 2017 06:19 )             39.5     11-03    136  |  93<L>  |  23  ----------------------------<  96  4.1   |  33<H>  |  0.39<L>    Ca    8.3<L>      03 Nov 2017 06:19            CAPILLARY BLOOD GLUCOSE      POCT Blood Glucose.: 87 mg/dL (04 Nov 2017 08:30)  POCT Blood Glucose.: 113 mg/dL (03 Nov 2017 12:25)      CARDIAC MARKERS ( 03 Nov 2017 06:19 )  .425 ng/mL / x     / 65 U/L / x     / 3.0 ng/mL      Hemoglobin A1C, Whole Blood: 6.7 % (11-03 @ 08:28)        RADIOLOGY & ADDITIONAL TESTS:    Imaging Personally Reviewed:  [ ] YES  [ ] NO    Consultant(s) Notes Reviewed:  [ x] YES  [ ] NO    Care Discussed with Consultants/Other Providers [x ] YES  [ ] NO    Care discussed with family,         [ x ]   yes  [  ]  No    imp:    stable[ x]    unstable[  ]     improving [   ]       unchanged  [  ]                Plans:  Continue present plans  [  x]               New consult [  ]   specialty  .......               order test[  ]    test name.                  Discharge Planning  [ x ] Systems   Constitutional: Negative.    HENT: Negative.     Eyes: Negative.    Respiratory: Negative.     Cardiovascular: Negative.    Gastrointestinal: Negative.    Musculoskeletal:  Positive for myalgias.   Skin:         Wound on lateral aspect of left leg, draining serosanguinous discharge.   Neurological: Negative.    Psychiatric/Behavioral: Negative.          Physical Exam  Temp:  [36.4 °C (97.5 °F)-36.8 °C (98.2 °F)] 36.4 °C (97.5 °F)  Pulse:  [73-78] 74  Resp:  [17-19] 17  BP: (151-188)/(79-98) 188/88  SpO2:  [92 %-98 %] 92 %    Physical Exam  Vitals and nursing note reviewed.   Constitutional:       General: He is not in acute distress.     Appearance: Normal appearance. He is not ill-appearing.   HENT:      Head: Normocephalic.      Right Ear: External ear normal.      Left Ear: External ear normal.      Mouth/Throat:      Mouth: Mucous membranes are moist.   Cardiovascular:      Rate and Rhythm: Normal rate.      Pulses: Normal pulses.      Heart sounds: Normal heart sounds.   Pulmonary:      Effort: Pulmonary effort is normal. No respiratory distress.      Breath sounds: Normal breath sounds.   Abdominal:      General: Abdomen is flat. There is no distension.      Palpations: Abdomen is soft.   Musculoskeletal:         General: Swelling and deformity present.      Left lower leg: Edema present.      Comments: BKA amputation left; Right 5th toe s/p metatarsal ray amputation   Skin:     General: Skin is warm and dry.      Capillary Refill: Capillary refill takes less than 2 seconds.      Findings: Lesion present.      Comments: Wound on lateral aspect of left leg, draining serosanguinous discharge.   Neurological:      General: No focal deficit present.      Mental Status: He is alert.   Psychiatric:         Mood and Affect: Mood normal.         Behavior: Behavior normal.         Thought Content: Thought content normal.         Fluids    Intake/Output Summary (Last 24 hours) at 3/31/2025 1349  Last data  filed at 3/31/2025 1200  Gross per 24 hour   Intake 1050 ml   Output 850 ml   Net 200 ml       Laboratory  Recent Labs     03/29/25  1940 03/30/25  0212 03/31/25  0156   WBC 6.9 6.2 6.8   RBC 2.81* 2.59* 2.68*   HEMOGLOBIN 8.5* 8.1* 8.1*   HEMATOCRIT 26.4* 24.6* 25.4*   MCV 94.0 95.0 94.8   MCH 30.2 31.3 30.2   MCHC 32.2* 32.9 31.9*   RDW 47.8 49.1 47.5   PLATELETCT 121* 128* 128*   MPV 10.5 10.8 10.6     Recent Labs     03/29/25  0817 03/30/25 0212 03/31/25  0156   SODIUM 132* 133* 131*   POTASSIUM 4.3 4.2 4.2   CHLORIDE 96 97 96   CO2 22 26 22   GLUCOSE 92 109* 160*   BUN 57* 38* 50*   CREATININE 7.76* 5.84* 7.23*   CALCIUM 7.7* 8.0* 7.7*     Recent Labs     03/28/25  1607   INR 1.20*               Imaging  EC-ECHOCARDIOGRAM COMPLETE W/O CONT   Final Result      CT-LOWER EXTREMITY UNILATERAL WITH CONTRAST LEFT   Final Result         1. Subcutaneous edema. This may be seen with cellulitis.   2. No drainable abscess is evident.   3. Below the knee amputation without evidence for osteomyelitis.      DX-CHEST-PORTABLE (1 VIEW)   Final Result      1.  Right lower lobe atelectasis and/or pneumonitis..           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  Mobile density noted on the left cusp of aortic valve.  -Cardiology consulted; appreciate recommendations.  -Antibiotics changed to Ancef per infectious disease, also consulting.  Coordinating with nephrology for antibiotic access and care in regards to dialysis.    Cellulitis of left thigh- (present on admission)  Assessment & Plan  Left, lateral leg open wound with serosanguineous drainage.  Surrounding erythema. Associated sepsis.   -Continue Ampicillin 2g Q4.  Awaiting susceptibilities with micro.  ID likely to be consulted thereafter for facilitating outpatient management.  Will likely need to coordinate this care with dialysis as patient  currently going Monday Wednesday Friday and has vascular access for this.  -Wound care consult, following  -CT scan s/o cellulitis, ruled out osteomyelitis    ESRD (end stage renal disease) on dialysis ([facility])- (present on admission)  Assessment & Plan  Reportedly dialysis Mon and Wed only.   Had extra dialysis session (03/27/25) per Nephrology's recommendation d/t volume overload.    No hyperkalemia, or other evidence of fluid overload.   -HD per nephrology. appreciate recommendations    Hypertension- (present on admission)  Assessment & Plan  Associated ESRD.   Elevated BP on presentation in setting of pain and acute distress.   Received Hydralazine IV x2.   BP now 140-160's / 60-80's.   No sign/symptoms of end organ damage.   - Continue Losartan 100 mg, torsemide 100mg, metalazone 5mg inpatient..  continue  amlodipine 5 mg  - Telemetry monitoring.  - Labetalol 10 mg IV q 4 hrs PRN.     Streptococcal bacteremia- (present on admission)  Assessment & Plan  -Blood culture growing Strep pyogens. Ancef per infectious disease  -See endocarditis problem.     Sepsis ([facility])- (present on admission)  Assessment & Plan  This is Sepsis Present on admission  SIRS criteria identified on my evaluation include: Tachycardia, with heart rate greater than 90 BPM and Leukocytosis, with WBC greater than 12,000  Clinical indicators of end organ dysfunction include Acute Respiratory Failure - (mechanical ventilation or BiPap or PaO2/FiO2 ratio < 300)  Source is Skin  Sepsis protocol initiated  Crystalloid Fluid Administration: restricted d/t ESRD on HD  IV antibiotics as appropriate for source of sepsis  Reassessment: I have reassessed the patient's hemodynamic status  Prior staph cellulitis.  - Continue Ancef, see bacteremia plan  - Telemetry monitoring.   - MRSA nares,negative  - MAP goal > 65 mm Hg.     Anemia- (present on admission)  Assessment & Plan  Known normocytic anemia of chronic disease  -Hb 7.3g s/p 1 unit on 03/28, will  transfuse another unit prbc on 03/29 and recheck Hb after    Diabetic enteropathy (HCC)- (present on admission)  Assessment & Plan  Chronic, intermittent diarrhea.  Per patient, on prescribed cholestyramine to control diarrhea.  - Resume cholestyramine inpatient.     Type 2 diabetes mellitus (HCC)- (present on admission)  Assessment & Plan  Complicated by diabetic retinopathy, nephropathy, and enteropathy.   Insulin-dependent. Compliant.   A1c: 6.5% this week, as per patient's report at Endo office visit.   Glucose at home reported 's.   - Lispro sliding scale.   - Hypoglycemia responsive measures in place.     Coronary artery disease- (present on admission)  Assessment & Plan  S/p stent placement.   No concerns for ACS.   - Continue Atorvastatin 80 mg daily.   - Telemetry monitoring.     S/P transmetatarsal amputation of foot, right (HCC)- (present on admission)  Assessment & Plan  Known history.   No open or infected wounds.   - Fall precautions.          VTE prophylaxis: SCDs/TEDs Hold Heparin for low Hb    I have performed a physical exam and reviewed and updated ROS and Plan today (3/31/2025). In review of yesterday's note (3/30/2025), there are no changes except as documented above.

## 2025-03-31 NOTE — PROGRESS NOTES
TELEMETRY STRIP SUMMARY:    Rhythm: SR  HR: 69-79 bpm  Ectopy: fPAC, oPVC    PA: 0.15  QRS: 0.09  QT: 0.37

## 2025-04-01 ENCOUNTER — ANESTHESIA (OUTPATIENT)
Dept: CARDIOLOGY | Facility: MEDICAL CENTER | Age: 55
DRG: 564 | End: 2025-04-01
Payer: MEDICARE

## 2025-04-01 ENCOUNTER — ANESTHESIA EVENT (OUTPATIENT)
Dept: CARDIOLOGY | Facility: MEDICAL CENTER | Age: 55
DRG: 564 | End: 2025-04-01
Payer: MEDICARE

## 2025-04-01 ENCOUNTER — APPOINTMENT (OUTPATIENT)
Dept: RADIOLOGY | Facility: MEDICAL CENTER | Age: 55
DRG: 564 | End: 2025-04-01
Payer: MEDICARE

## 2025-04-01 ENCOUNTER — APPOINTMENT (OUTPATIENT)
Dept: CARDIOLOGY | Facility: MEDICAL CENTER | Age: 55
DRG: 564 | End: 2025-04-01
Attending: INTERNAL MEDICINE
Payer: MEDICARE

## 2025-04-01 PROBLEM — I25.10 CORONARY ARTERY DISEASE DUE TO LIPID RICH PLAQUE: Chronic | Status: ACTIVE | Noted: 2019-10-02

## 2025-04-01 PROBLEM — I47.10 PAROXYSMAL SVT (SUPRAVENTRICULAR TACHYCARDIA) (HCC): Chronic | Status: ACTIVE | Noted: 2025-04-01

## 2025-04-01 PROBLEM — I25.83 CORONARY ARTERY DISEASE DUE TO LIPID RICH PLAQUE: Chronic | Status: ACTIVE | Noted: 2019-10-02

## 2025-04-01 PROBLEM — I34.0 MODERATE MITRAL REGURGITATION: Chronic | Status: ACTIVE | Noted: 2025-04-01

## 2025-04-01 LAB
ALBUMIN SERPL BCP-MCNC: 3.1 G/DL (ref 3.2–4.9)
ALBUMIN/GLOB SERPL: 1 G/DL
ALP SERPL-CCNC: 54 U/L (ref 30–99)
ALT SERPL-CCNC: <5 U/L (ref 2–50)
ANION GAP SERPL CALC-SCNC: 13 MMOL/L (ref 7–16)
AST SERPL-CCNC: 17 U/L (ref 12–45)
BACTERIA BLD CULT: ABNORMAL
BACTERIA BLD CULT: ABNORMAL
BACTERIA UR CULT: NORMAL
BILIRUB SERPL-MCNC: 1 MG/DL (ref 0.1–1.5)
BUN SERPL-MCNC: 34 MG/DL (ref 8–22)
CALCIUM ALBUM COR SERPL-MCNC: 8.4 MG/DL (ref 8.5–10.5)
CALCIUM SERPL-MCNC: 7.7 MG/DL (ref 8.5–10.5)
CHLORIDE SERPL-SCNC: 97 MMOL/L (ref 96–112)
CO2 SERPL-SCNC: 23 MMOL/L (ref 20–33)
CREAT SERPL-MCNC: 5.91 MG/DL (ref 0.5–1.4)
ERYTHROCYTE [DISTWIDTH] IN BLOOD BY AUTOMATED COUNT: 44.3 FL (ref 35.9–50)
GFR SERPLBLD CREATININE-BSD FMLA CKD-EPI: 10 ML/MIN/1.73 M 2
GLOBULIN SER CALC-MCNC: 3.1 G/DL (ref 1.9–3.5)
GLUCOSE BLD STRIP.AUTO-MCNC: 126 MG/DL (ref 65–99)
GLUCOSE BLD STRIP.AUTO-MCNC: 130 MG/DL (ref 65–99)
GLUCOSE BLD STRIP.AUTO-MCNC: 163 MG/DL (ref 65–99)
GLUCOSE BLD STRIP.AUTO-MCNC: 178 MG/DL (ref 65–99)
GLUCOSE SERPL-MCNC: 154 MG/DL (ref 65–99)
HCT VFR BLD AUTO: 25.7 % (ref 42–52)
HGB BLD-MCNC: 8.4 G/DL (ref 14–18)
MCH RBC QN AUTO: 30 PG (ref 27–33)
MCHC RBC AUTO-ENTMCNC: 32.7 G/DL (ref 32.3–36.5)
MCV RBC AUTO: 91.8 FL (ref 81.4–97.8)
PHOSPHATE SERPL-MCNC: 3.7 MG/DL (ref 2.5–4.5)
PLATELET # BLD AUTO: 127 K/UL (ref 164–446)
PMV BLD AUTO: 10.4 FL (ref 9–12.9)
POTASSIUM SERPL-SCNC: 4.2 MMOL/L (ref 3.6–5.5)
PROT SERPL-MCNC: 6.2 G/DL (ref 6–8.2)
RBC # BLD AUTO: 2.8 M/UL (ref 4.7–6.1)
SIGNIFICANT IND 70042: ABNORMAL
SIGNIFICANT IND 70042: NORMAL
SITE SITE: ABNORMAL
SITE SITE: NORMAL
SODIUM SERPL-SCNC: 133 MMOL/L (ref 135–145)
SOURCE SOURCE: ABNORMAL
SOURCE SOURCE: NORMAL
WBC # BLD AUTO: 5.2 K/UL (ref 4.8–10.8)

## 2025-04-01 PROCEDURE — 0JPT3XZ REMOVAL OF TUNNELED VASCULAR ACCESS DEVICE FROM TRUNK SUBCUTANEOUS TISSUE AND FASCIA, PERCUTANEOUS APPROACH: ICD-10-PCS | Performed by: INTERNAL MEDICINE

## 2025-04-01 PROCEDURE — 160035 HCHG PACU - 1ST 60 MINS PHASE I

## 2025-04-01 PROCEDURE — 80053 COMPREHEN METABOLIC PANEL: CPT

## 2025-04-01 PROCEDURE — 93312 ECHO TRANSESOPHAGEAL: CPT | Mod: 26 | Performed by: INTERNAL MEDICINE

## 2025-04-01 PROCEDURE — 700102 HCHG RX REV CODE 250 W/ 637 OVERRIDE(OP)

## 2025-04-01 PROCEDURE — 700111 HCHG RX REV CODE 636 W/ 250 OVERRIDE (IP)

## 2025-04-01 PROCEDURE — 99232 SBSQ HOSP IP/OBS MODERATE 35: CPT | Performed by: INTERNAL MEDICINE

## 2025-04-01 PROCEDURE — 4410588 EC-TEE W/O CONT

## 2025-04-01 PROCEDURE — 700105 HCHG RX REV CODE 258

## 2025-04-01 PROCEDURE — 84100 ASSAY OF PHOSPHORUS: CPT

## 2025-04-01 PROCEDURE — 36415 COLL VENOUS BLD VENIPUNCTURE: CPT

## 2025-04-01 PROCEDURE — A9270 NON-COVERED ITEM OR SERVICE: HCPCS | Performed by: INTERNAL MEDICINE

## 2025-04-01 PROCEDURE — 700111 HCHG RX REV CODE 636 W/ 250 OVERRIDE (IP): Performed by: ANESTHESIOLOGY

## 2025-04-01 PROCEDURE — 02PYX3Z REMOVAL OF INFUSION DEVICE FROM GREAT VESSEL, EXTERNAL APPROACH: ICD-10-PCS | Performed by: INTERNAL MEDICINE

## 2025-04-01 PROCEDURE — 770020 HCHG ROOM/CARE - TELE (206)

## 2025-04-01 PROCEDURE — 99233 SBSQ HOSP IP/OBS HIGH 50: CPT | Performed by: HOSPITALIST

## 2025-04-01 PROCEDURE — 85027 COMPLETE CBC AUTOMATED: CPT

## 2025-04-01 PROCEDURE — 160002 HCHG RECOVERY MINUTES (STAT)

## 2025-04-01 PROCEDURE — A9270 NON-COVERED ITEM OR SERVICE: HCPCS

## 2025-04-01 PROCEDURE — 82962 GLUCOSE BLOOD TEST: CPT

## 2025-04-01 PROCEDURE — 36500 INSERTION OF CATHETER VEIN: CPT

## 2025-04-01 PROCEDURE — 160036 HCHG PACU - EA ADDL 30 MINS PHASE I

## 2025-04-01 PROCEDURE — 700102 HCHG RX REV CODE 250 W/ 637 OVERRIDE(OP): Performed by: INTERNAL MEDICINE

## 2025-04-01 RX ORDER — HALOPERIDOL 5 MG/ML
1 INJECTION INTRAMUSCULAR
Status: DISCONTINUED | OUTPATIENT
Start: 2025-04-01 | End: 2025-04-01 | Stop reason: HOSPADM

## 2025-04-01 RX ORDER — SODIUM CHLORIDE, SODIUM LACTATE, POTASSIUM CHLORIDE, CALCIUM CHLORIDE 600; 310; 30; 20 MG/100ML; MG/100ML; MG/100ML; MG/100ML
INJECTION, SOLUTION INTRAVENOUS CONTINUOUS
Status: DISCONTINUED | OUTPATIENT
Start: 2025-04-01 | End: 2025-04-02 | Stop reason: HOSPADM

## 2025-04-01 RX ORDER — SEVELAMER CARBONATE 800 MG/1
1600 TABLET, FILM COATED ORAL
Status: DISCONTINUED | OUTPATIENT
Start: 2025-04-01 | End: 2025-04-02 | Stop reason: HOSPADM

## 2025-04-01 RX ORDER — ONDANSETRON 2 MG/ML
4 INJECTION INTRAMUSCULAR; INTRAVENOUS
Status: DISCONTINUED | OUTPATIENT
Start: 2025-04-01 | End: 2025-04-01 | Stop reason: HOSPADM

## 2025-04-01 RX ORDER — DIPHENHYDRAMINE HYDROCHLORIDE 50 MG/ML
12.5 INJECTION, SOLUTION INTRAMUSCULAR; INTRAVENOUS
Status: DISCONTINUED | OUTPATIENT
Start: 2025-04-01 | End: 2025-04-01 | Stop reason: HOSPADM

## 2025-04-01 RX ADMIN — AMLODIPINE BESYLATE 5 MG: 5 TABLET ORAL at 05:51

## 2025-04-01 RX ADMIN — OXYCODONE HYDROCHLORIDE 5 MG: 5 TABLET ORAL at 22:11

## 2025-04-01 RX ADMIN — CHOLESTYRAMINE 4 G: 4 POWDER, FOR SUSPENSION ORAL at 17:32

## 2025-04-01 RX ADMIN — METOLAZONE 5 MG: 5 TABLET ORAL at 05:51

## 2025-04-01 RX ADMIN — TORSEMIDE 100 MG: 100 TABLET ORAL at 05:51

## 2025-04-01 RX ADMIN — CEFAZOLIN 2 G: 2 INJECTION, POWDER, FOR SOLUTION INTRAMUSCULAR; INTRAVENOUS at 17:54

## 2025-04-01 RX ADMIN — OMEPRAZOLE 20 MG: 20 CAPSULE, DELAYED RELEASE ORAL at 05:51

## 2025-04-01 RX ADMIN — ATORVASTATIN CALCIUM 80 MG: 80 TABLET, FILM COATED ORAL at 20:53

## 2025-04-01 RX ADMIN — OXYCODONE HYDROCHLORIDE 5 MG: 5 TABLET ORAL at 02:09

## 2025-04-01 RX ADMIN — INSULIN LISPRO 1 UNITS: 100 INJECTION, SOLUTION INTRAVENOUS; SUBCUTANEOUS at 20:52

## 2025-04-01 RX ADMIN — LOSARTAN POTASSIUM 100 MG: 50 TABLET, FILM COATED ORAL at 05:51

## 2025-04-01 RX ADMIN — INSULIN LISPRO 1 UNITS: 100 INJECTION, SOLUTION INTRAVENOUS; SUBCUTANEOUS at 17:43

## 2025-04-01 RX ADMIN — PROPOFOL 200 MG: 10 INJECTION, EMULSION INTRAVENOUS at 08:57

## 2025-04-01 ASSESSMENT — ENCOUNTER SYMPTOMS
SHORTNESS OF BREATH: 0
PSYCHIATRIC NEGATIVE: 1
FEVER: 0
CONSTITUTIONAL NEGATIVE: 1
NEUROLOGICAL NEGATIVE: 1
MYALGIAS: 1
ABDOMINAL PAIN: 0
RESPIRATORY NEGATIVE: 1
GASTROINTESTINAL NEGATIVE: 1
EYES NEGATIVE: 1
CARDIOVASCULAR NEGATIVE: 1

## 2025-04-01 ASSESSMENT — PAIN DESCRIPTION - PAIN TYPE
TYPE: ACUTE PAIN

## 2025-04-01 ASSESSMENT — FIBROSIS 4 INDEX: FIB4 SCORE: 3.12

## 2025-04-01 NOTE — NON-PROVIDER
[facility] INTERNAL MEDICINE PROGRESS NOTE - STUDENT NOTE                Attending:   Dr. Pelon Kirkland     Senior Resident:  Dr. Juan Carlos Greenberg     Resident:   Dr. Preeti Mark     PATIENT:   Beni Moore; [id number]; 1970     CC: L calf and diabetic foot ulcer with cellulitis        HOSPITAL COURSE: Beni Moore is a 54 year old male admitted for L calf and diabetic foot with cellulitis, growing group A bacteremia, on admission day 4. He presented with an ulcer from a poor-fitting prosthesis that began to gradually worsen with pain and swelling with purulence.      PMH includes IDDM w/ enteropathy-related diarrhea, ESRD on HD, HTN, dyslipidemia, s/p BKA, s/p R transmetatarsal amputation, CAD s/p stent placement.         INTERVAL UPDATES  No acute events overnight. Patient was not evaluated by me as he was in procedure room during this time.      Review of Systems   All other systems reviewed and are negative.        OBJECTIVE:  Vitals          Vitals:     04/01/25 1125 04/01/25 1157 04/01/25 1225 04/01/25 1325   BP: (!) 167/93 (!) 175/94       Pulse: 77 76 78 82   Resp: 18 18 16 18   Temp: 36.5 °C (97.7 °F) 36.7 °C (98.1 °F) 36.5 °C (97.7 °F) 36.4 °C (97.6 °F)   TempSrc: Temporal Temporal Temporal Temporal   SpO2: 96% 98% 98% 97%   Weight:           Height:                    Intake/Output Summary (Last 24 hours) at 4/1/2025 1356  Last data filed at 4/1/2025 1200      Gross per 24 hour   Intake 1800 ml   Output 4900 ml   Net -3100 ml         PHYSICAL EXAM:   General: No acute distress, afebrile, resting comfortably, conversational   HEENT: NC/AT. EOMI.   Cardiovascular: RRR without murmurs, rubs, heaves. Normal capillary refill   Respiratory: CTAB, no tachypnea or retractions   Abdomen: normal bowel sounds, soft, nontender, nondistended, no masses, no organomegaly   EXT:  R foot amputation down to PIPs, L BKA, bandages clean, dry, and intact  Skin: Erythema noted in L knee with bandages intact, tender to  "palpation  Neuro: Non-focal, alert and orientated         LABS:        Recent Labs     03/30/25 0212 03/31/25 0156 04/01/25  0410   WBC 6.2 6.8 5.2   RBC 2.59* 2.68* 2.80*   HEMOGLOBIN 8.1* 8.1* 8.4*   HEMATOCRIT 24.6* 25.4* 25.7*   MCV 95.0 94.8 91.8   MCH 31.3 30.2 30.0   RDW 49.1 47.5 44.3   PLATELETCT 128* 128* 127*   MPV 10.8 10.6 10.4            Recent Labs     03/30/25 0212 03/31/25 0156 04/01/25  0410   SODIUM 133* 131* 133*   POTASSIUM 4.2 4.2 4.2   CHLORIDE 97 96 97   CO2 26 22 23   BUN 38* 50* 34*   CREATININE 5.84* 7.23* 5.91*   CALCIUM 8.0* 7.7* 7.7*   ALBUMIN 3.1* 3.0* 3.1*      Estimated GFR/CRCL = Estimated Creatinine Clearance: 17.2 mL/min (A) (by C-G formula based on SCr of 5.91 mg/dL ()).        Recent Labs     03/30/25 0212 03/31/25 0156 04/01/25  0410   GLUCOSE 109* 160* 154*            Recent Labs     03/30/25 0212 03/31/25 0156 04/01/25  0410   ASTSGOT 16 15 17   ALTSGPT 8 <5 <5   TBILIRUBIN 1.6* 1.0 1.0   ALKPHOSPHAT 69 50 54   GLOBULIN 3.1 3.0 3.1              No results for input(s): \"INR\", \"APTT\", \"FIBRINOGEN\" in the last 72 hours.     Invalid input(s): \"DIMER\"     MICROBIOLOGY:         Blood Culture   Date Value Ref Range Status   06/30/2018 No growth after 5 days of incubation.   Final         IMAGING:   EC-MACKENZIE W/O CONT           EC-ECHOCARDIOGRAM COMPLETE W/O CONT   Final Result       CT-LOWER EXTREMITY UNILATERAL WITH CONTRAST LEFT   Final Result           1. Subcutaneous edema. This may be seen with cellulitis.   2. No drainable abscess is evident.   3. Below the knee amputation without evidence for osteomyelitis.       DX-CHEST-PORTABLE (1 VIEW)   Final Result       1.  Right lower lobe atelectasis and/or pneumonitis..       IR-CVC TUNNEL W/O PORT REMOVAL    (Results Pending)         CULTURES:   Results         Procedure Component Value Units Date/Time     Blood Culture - Draw one from central line and one from peripheral site [[id number]]  (Abnormal)  (Susceptibility) " Collected: 03/28/25 1632     Order Status: Completed Specimen: Blood from Line Updated: 04/01/25 0742       Significant Indicator POS       Source BLD       Site Peripheral       Culture Result Growth detected by automated blood culture system. 03/29/2025  10:55           Streptococcus pyogenes (Group A)  This isolate does NOT demonstrate inducible Clindamycin  resistance in vitro.       Susceptibility         Streptococcus pyogenes (group a) (1)         Antibiotic Interpretation Microscan   Method Status     Clindamycin Sensitive - mm KB Final     Penicillin Sensitive 0.016 mcg/mL KB Final     Cefotaxime Sensitive 0.012 mcg/mL KB Final     Erythromycin Sensitive 0.125 mcg/mL KB Final                            Urine Culture (New) [[id number]] Collected: 03/30/25 0532     Order Status: Completed Specimen: Urine Updated: 04/01/25 0715       Significant Indicator NEG       Source UR       Site -       Culture Result No growth at 48 hours.     BLOOD CULTURE [[id number]] Collected: 03/31/25 1257     Order Status: Completed Specimen: Blood from Peripheral Updated: 03/31/25 1608       Significant Indicator NEG       Source BLD       Site PERIPHERAL       Culture Result No Growth  Note: Blood cultures are incubated for 5 days and  are monitored continuously.Positive blood cultures  are called to the RN and reported as soon as  they are identified.        BLOOD CULTURE [[id number]] Collected: 03/31/25 1255     Order Status: Completed Specimen: Blood from Peripheral Updated: 03/31/25 1608       Significant Indicator NEG       Source BLD       Site PERIPHERAL       Culture Result No Growth  Note: Blood cultures are incubated for 5 days and  are monitored continuously.Positive blood cultures  are called to the RN and reported as soon as  they are identified.        Culture Wound w/Gram Stain [[id number]]  (Abnormal) Collected: 03/29/25 1130     Order Status: Completed Specimen: Wound from Left Leg Updated: 03/31/25 1034        Significant Indicator POS       Source WND       Site LEFT LEG       Culture Result -       Gram Stain Result No organisms seen.       Culture Result Streptococcus pyogenes (Group A)  Moderate growth       E-Test [[id number]] Collected: 03/28/25 1632     Order Status: Completed Specimen: Other Updated: 03/31/25 0731       ETEST Sensitivity INTERIM     Narrative:       [facility] tel. [phone] 03/29/2025, 12:59, RB PERF. RESULTS CALLED TO: Frida HENAO  [id number] for Group A Strep  [facility] tel. [phone] 03/29/2025, 10:55, RB PERF. RESULTS CALLED TO: [id number]     Urinalysis [[id number]]  (Abnormal) Collected: 03/30/25 0532     Order Status: Completed Specimen: Urine Updated: 03/30/25 0635       Color Yellow       Character Clear       Specific Gravity 1.018       Ph 8.0       Glucose 250 mg/dL         Ketones Negative mg/dL         Protein >=1000 mg/dL         Bilirubin Negative       Urobilinogen, Urine 0.2 EU/dL         Nitrite Negative       Leukocyte Esterase Negative       Occult Blood Small       Micro Urine Req Microscopic     GRAM STAIN [[id number]] Collected: 03/29/25 1130     Order Status: Completed Specimen: Wound Updated: 03/29/25 1931       Significant Indicator .       Source WND       Site LEFT LEG       Gram Stain Result No organisms seen.     MRSA By PCR (Amp) [[id number]] Collected: 03/28/25 2041     Order Status: Completed Specimen: Respirate from Nares Updated: 03/29/25 0008       MRSA by PCR Negative     Blood Culture - Draw one from central line and one from peripheral site [[id number]] Collected: 03/28/25 1632     Order Status: Completed Specimen: Blood from Peripheral Updated: 03/28/25 1808       Significant Indicator NEG       Source BLD       Site PERIPHERAL       Culture Result No Growth  Note: Blood cultures are incubated for 5 days and  are monitored continuously.Positive blood cultures  are called to the RN and reported as soon as  they are identified.                   MEDS:       Current Facility-Administered  Medications   Medication Last Admin    [START ON 4/2/2025] amLODIPine (Norvasc) tablet 7.5 mg      lactated ringers infusion Rate Verify at 04/01/25 0920    ceFAZolin (Ancef) 2 g in  mL IVPB      oxyCODONE immediate-release (Roxicodone) tablet 2.5 mg       Or    oxyCODONE immediate-release (Roxicodone) tablet 5 mg 5 mg at 04/01/25 0209     Or    HYDROmorphone (Dilaudid) injection 0.25 mg 0.25 mg at 03/30/25 2255    epoetin (Retacrit) 5,000 Units injection (Dialysis Use Only) 5,000 Units at 03/31/25 1624    insulin GLARGINE (Lantus,Semglee) injection      aspirin EC tablet 81 mg 81 mg at 03/31/25 0550    NS (Bolus) 0.9 % infusion 100 mL      heparin intracatheter (for DIALYSIS USE ONLY) 1,800 Units 1,800 Units at 03/29/25 1317    heparin intracatheter (for DIALYSIS USE ONLY) 1,800 Units 1,800 Units at 03/29/25 1317    atorvastatin (Lipitor) tablet 80 mg 80 mg at 03/31/25 2007    cholestyramine (Questran) 4 GM powder 4 g 4 g at 03/31/25 1839    losartan (Cozaar) tablet 100 mg 100 mg at 04/01/25 0551    metOLazone (Zaroxolyn) tablet 5 mg 5 mg at 04/01/25 0551    torsemide (Demadex) tablet 100 mg 100 mg at 04/01/25 0551    omeprazole (PriLOSEC) capsule 20 mg 20 mg at 04/01/25 0551    [Held by provider] heparin injection 5,000 Units 5,000 Units at 03/29/25 0503    labetalol (Normodyne/Trandate) injection 10 mg 10 mg at 03/31/25 2114    acetaminophen (Tylenol) tablet 1,000 mg 1,000 mg at 03/29/25 0321    insulin lispro (HumaLOG,AdmeLOG) subcutaneous injection 1 Units at 03/31/25 2323     And    dextrose 50 % (D50W) injection 25 g      Pharmacy Consult Request ...Pain Management Review 1 Each      guaiFENesin dextromethorphan (Robitussin DM) 100-10 MG/5ML syrup 5 mL           ASSESSMENT/PLAN: 54 y.o. male admitted for L calf & diabetic foot ulcerative cellulitis      Endocarditis  Assessment & Plan  -Echo was positive for vegetations localized to the L coronary side aortic or mitral valve  -Recommended antibiotic  treatment with Cefazolin  -Cardiology recommends MACKENZIE to establish baseline and reassess after antibiotic treatment  -Nephrology recommends patient is able to do MWF dialysis and fix tunneled dialysis port  -4/1: Results from MACKENZIE shows patient does not have any vegetations on either aortic or mitral valve. Likely false positive result.     ESRD  Assessment & Plan  -Dialysis recommended MWF inpatient. Outpatient was MW. Will likely continue to need 3 times weekly dialysis.  -Continue to monitor for changes in BP, continue amlodipine.  -Patient to receive 4/2/25.     Cellulitis  Assessment & Plan  -Left lateral BKA stump with no erythema or tenderness to palpation likely improving due to antibiotic regimen  -Continue Lineazolid  -Monitor for changes        Core Measures:   Abx: Cefazolin  DVT prophylaxis: Heparin  Code Status: Full code     Disposition: Will stay  Rl Saleh, Student MS3   call md office if having temperature above 101,if incision site looks red ,swollen or discharge noted ,if discoloration or swelling noted in flap area,if having excruciating pain not relieved by medication

## 2025-04-01 NOTE — PROGRESS NOTES
Patient still out of room for MACKENZIE  Called by Cards-no vegetation seen, states TTE was false positive  Treatment course 10 days IV Ancef post-dialysis from date of negative blood cultures  Blood cult 3/31 neg to date  FU wound care LLE  FU ID clinic 1-2 weeks after discharge  OK to see Omid APRN  Will sign off-please reconsult if needed

## 2025-04-01 NOTE — PROGRESS NOTES
Nephrology Daily Progress Note    Date of Service  4/1/2025    Chief Complaint  54 y.o. male with history of diabetes, peripheral vascular disease with left BKA, ESRD on hemodialysis Mondays and Wednesdays admitted 3/28/2025 with cellulitis and group A strep bacteremia    Interval Problem Update  4/1 -patient tolerated dialysis yesterday with 4 L removed.  Denies chest pain, shortness of breath.  Patient amenable to 3 times weekly dialysis in order to receive antibiotics, and moving forward in order to mitigate volume overload.  Tunneled dialysis catheter has been removed    Review of Systems  Review of Systems   Constitutional:  Negative for fever.   Respiratory:  Negative for shortness of breath.    Cardiovascular:  Negative for chest pain.   Gastrointestinal:  Negative for abdominal pain.   All other systems reviewed and are negative.       Physical Exam  Temp:  [36.1 °C (96.9 °F)-37 °C (98.6 °F)] 36.4 °C (97.5 °F)  Pulse:  [73-83] 78  Resp:  [14-18] 18  BP: (147-184)/(75-96) 165/92  SpO2:  [93 %-100 %] 98 %    Physical Exam  Constitutional:       General: He is not in acute distress.     Appearance: He is well-developed. He is not diaphoretic.   HENT:      Head: Normocephalic and atraumatic.      Mouth/Throat:      Mouth: Mucous membranes are moist.      Pharynx: Oropharynx is clear. No oropharyngeal exudate.   Eyes:      General: No scleral icterus.     Extraocular Movements: Extraocular movements intact.   Neck:      Trachea: No tracheal deviation.   Cardiovascular:      Rate and Rhythm: Normal rate.      Heart sounds: Normal heart sounds. No murmur heard.  Pulmonary:      Effort: Pulmonary effort is normal.      Breath sounds: Normal breath sounds. No stridor. No rales.   Abdominal:      General: There is no distension.      Palpations: Abdomen is soft.      Tenderness: There is no abdominal tenderness.   Musculoskeletal:         General: Deformity (left BKA) present. Normal range of motion.      Right lower  leg: No edema.      Left lower leg: No edema.   Skin:     General: Skin is warm and dry.   Neurological:      General: No focal deficit present.      Mental Status: He is alert and oriented to person, place, and time.   Psychiatric:         Mood and Affect: Mood normal.         Behavior: Behavior normal.     Dialysis access: Left arm AV fistula, patent bruit and thrill    Fluids    Intake/Output Summary (Last 24 hours) at 4/1/2025 1455  Last data filed at 4/1/2025 1200  Gross per 24 hour   Intake 1800 ml   Output 4900 ml   Net -3100 ml       Laboratory  Labs reviewed, pertinent labs below.  Recent Labs     03/30/25 0212 03/31/25  0156 04/01/25  0410   WBC 6.2 6.8 5.2   RBC 2.59* 2.68* 2.80*   HEMOGLOBIN 8.1* 8.1* 8.4*   HEMATOCRIT 24.6* 25.4* 25.7*   MCV 95.0 94.8 91.8   MCH 31.3 30.2 30.0   MCHC 32.9 31.9* 32.7   RDW 49.1 47.5 44.3   PLATELETCT 128* 128* 127*   MPV 10.8 10.6 10.4     Recent Labs     03/30/25 0212 03/31/25  0156 04/01/25  0410   SODIUM 133* 131* 133*   POTASSIUM 4.2 4.2 4.2   CHLORIDE 97 96 97   CO2 26 22 23   GLUCOSE 109* 160* 154*   BUN 38* 50* 34*   CREATININE 5.84* 7.23* 5.91*   CALCIUM 8.0* 7.7* 7.7*               URINALYSIS:  Lab Results   Component Value Date/Time    COLORURINE Yellow 03/30/2025 0532    CLARITY Clear 03/30/2025 0532    SPECGRAVITY 1.018 03/30/2025 0532    PHURINE 8.0 03/30/2025 0532    KETONES Negative 03/30/2025 0532    PROTEINURIN >=1000 (A) 03/30/2025 0532    BILIRUBINUR Negative 03/30/2025 0532    UROBILU 0.2 03/30/2025 0532    NITRITE Negative 03/30/2025 0532    LEUKESTERAS Negative 03/30/2025 0532    OCCULTBLOOD Small (A) 03/30/2025 0532     [facility]  Lab Results   Component Value Date/Time    TOTPROTUR 258.0 (H) 05/03/2023 1210      Lab Results   Component Value Date/Time    CREATININEU 27.57 05/03/2023 1210         Imaging interpreted by radiologist. Imaging reports reviewed with pertinent findings below  EC-MACKENZIE W/O CONT         EC-ECHOCARDIOGRAM COMPLETE W/O CONT    Final Result      CT-LOWER EXTREMITY UNILATERAL WITH CONTRAST LEFT   Final Result         1. Subcutaneous edema. This may be seen with cellulitis.   2. No drainable abscess is evident.   3. Below the knee amputation without evidence for osteomyelitis.      DX-CHEST-PORTABLE (1 VIEW)   Final Result      1.  Right lower lobe atelectasis and/or pneumonitis..      IR-CVC TUNNEL W/O PORT REMOVAL    (Results Pending)         Current Facility-Administered Medications   Medication Dose Route Frequency Provider Last Rate Last Admin    [START ON 4/2/2025] amLODIPine (Norvasc) tablet 7.5 mg  7.5 mg Oral Q DAY Preeti Mark M.D.        lactated ringers infusion   Intravenous Continuous Abiel Mills M.D. 50 mL/hr at 04/01/25 0920 Rate Verify at 04/01/25 0920    ceFAZolin (Ancef) 2 g in  mL IVPB  2 g Intravenous Q EVENING Preeti Mark M.D.        oxyCODONE immediate-release (Roxicodone) tablet 2.5 mg  2.5 mg Oral Q6HRS PRN Juan Carlos Greenberg, D.O.        Or    oxyCODONE immediate-release (Roxicodone) tablet 5 mg  5 mg Oral Q6HRS PRN Juan Carlos Greenberg, D.O.   5 mg at 04/01/25 0209    Or    HYDROmorphone (Dilaudid) injection 0.25 mg  0.25 mg Intravenous Q4HRS PRN Juan Carlos Greenberg, D.O.   0.25 mg at 03/30/25 2255    epoetin (Retacrit) 5,000 Units injection (Dialysis Use Only)  5,000 Units Intravenous MO, WE + FR Melyssa Plascencia M.D.   5,000 Units at 03/31/25 1624    insulin GLARGINE (Lantus,Semglee) injection  18 Units Subcutaneous QHS Newton Watson M.D.        aspirin EC tablet 81 mg  81 mg Oral DAILY Preeti Mark M.D.   81 mg at 03/31/25 0550    NS (Bolus) 0.9 % infusion 100 mL  100 mL Intravenous DIALYSIS PRN Melyssa Plascencia M.D.        heparin intracatheter (for DIALYSIS USE ONLY) 1,800 Units  1,800 Units Intracatheter DIALYSIS PRN Melyssa Plascencia M.D.   1,800 Units at 03/29/25 1317    heparin intracatheter (for DIALYSIS USE ONLY) 1,800 Units  1,800 Units Intracatheter DIALYSIS PRN Melyssa Plascencia M.D.   1,800  Units at 03/29/25 1317    atorvastatin (Lipitor) tablet 80 mg  80 mg Oral Q EVENING Newton Watson M.D.   80 mg at 03/31/25 2007    cholestyramine (Questran) 4 GM powder 4 g  1 Packet Oral BID Newton Watson M.D.   4 g at 03/31/25 1839    losartan (Cozaar) tablet 100 mg  100 mg Oral DAILY Newton Watson M.D.   100 mg at 04/01/25 0551    metOLazone (Zaroxolyn) tablet 5 mg  5 mg Oral DAILY Newton Watson M.D.   5 mg at 04/01/25 0551    torsemide (Demadex) tablet 100 mg  100 mg Oral DAILY Newton Watson M.D.   100 mg at 04/01/25 0551    omeprazole (PriLOSEC) capsule 20 mg  20 mg Oral QAM Newton Watson M.D.   20 mg at 04/01/25 0551    [Held by provider] heparin injection 5,000 Units  5,000 Units Subcutaneous Q8HRS Newton Watson M.D.   5,000 Units at 03/29/25 0503    labetalol (Normodyne/Trandate) injection 10 mg  10 mg Intravenous Q4HRS PRN Newton Watson M.D.   10 mg at 03/31/25 2114    acetaminophen (Tylenol) tablet 1,000 mg  1,000 mg Oral Q6HRS PRN Newton Watson M.D.   1,000 mg at 03/29/25 0321    insulin lispro (HumaLOG,AdmeLOG) subcutaneous injection  1-6 Units Subcutaneous 4X/DAY ACHS Newton Watson M.D.   1 Units at 03/31/25 2323    And    dextrose 50 % (D50W) injection 25 g  25 g Intravenous Q15 MIN PRN Newton Watson M.D.        Pharmacy Consult Request ...Pain Management Review 1 Each  1 Each Other PHARMACY TO DOSE Newton Watson M.D.        guaiFENesin dextromethorphan (Robitussin DM) 100-10 MG/5ML syrup 5 mL  5 mL Oral Q6HRS PRN Newton Watson M.D.             Assessment/Plan  54 y.o. male with history of diabetes, peripheral vascular disease with left BKA, ESRD on hemodialysis Mondays and Wednesdays admitted 3/28/2025 with cellulitis and group A strep bacteremia    1.  ESRD on hemodialysis Mondays and Wednesdays.  Will move forward with dialysis Mondays Wednesdays and Fridays.  Oliguric.  Plan on dialysis tomorrow per usual schedule.  Daily weights. Avoid NSAIDs and  "other nephrotoxins as the patient still urinates.  Check renal function panel daily.    2.  Dialysis access: Left arm AV fistula, patent.  Continue left arm precautions.   Right IJ permacath has been discontinued as AV fistula is mature, and as patient is bacteremic.    3.  Group A strep bacteremia, reason for admission.   Preliminary read of MACKENZIE negative for endocarditis.  Infectious disease recommending cefazolin for 10 days post negative blood cultures, blood cultures from 3/31/2025 have been no growth to date.  I will call antibiotic orders for cefazolin to outpatient dialysis, with last dose on 4/9/2025.    4.  Anemia of chronic disease.  Persistent, continue Epogen 3 times weekly with dialysis.  Check CBC daily.    5.  Hypertension.  Uncontrolled, but overall improving.  Volume overload is driving hypertension.  Will continue ultrafiltration as tolerated by blood pressure, as high blood pressure usually improves with ultrafiltration with dialysis.  Recommend low-sodium diet.   I do not recommend increasing antihypertensive medications until the patient is more optimized from a volume status, this will take time with ongoing dialysis.    6.  Hyperphosphatemia, noted as an outpatient.  I will order phosphorus binders, Renvela 1600 mg p.o. 3 times daily with meals.  Recommend low phosphorus diet.  Check \"renal function panel\" daily (includes phosphorus level).    Discussed with [facility] internal medicine resident Dr. Preeti AZUL to discharge today from Nephrology standpoint.   There is no need for outpatient nephrology clinic follow up appointment, as the patient will be seen by a nephrologist at their outpatient dialysis center.     Varun Ortiz MD  Nephrology  [facility]          "

## 2025-04-01 NOTE — PROGRESS NOTES
NO HARD CHART:       Patient to procedure Room for MACKENZIE with no hard chart, only loose papers. Per Floor RN, no hard chart available. Procedure RN completed Pre-Procedure Consent paperwork packet, including: WHO Yellow Sheet signed by RN and MD, Informed consent for MACKENZIE procedure signed by RN, patient, and Cardiology, and Informed consent for Anesthesia signed by Anesthesia MD and patient. Procedure Packet secured to loose papers and hand delivered to receiving PACU RN who acknowledged receipt and no hard chart.

## 2025-04-01 NOTE — CARE PLAN
The patient is Stable - Low risk of patient condition declining or worsening    Shift Goals  Clinical Goals: MACKENZIE, abx, mobility  Patient Goals: Pain control, MACKENZIE  Family Goals: mima    Progress made toward(s) clinical / shift goals:    Problem: Pain - Standard  Goal: Alleviation of pain or a reduction in pain to the patient’s comfort goal  Description: Target End Date:  Prior to discharge or change in level of careDocument on Vitals flowsheet1.  Document pain using the appropriate pain scale per order or unit policy2.  Educate and implement non-pharmacologic comfort measures (i.e. relaxation, distraction, massage, cold/heat therapy, etc.)3.  Pain management medications as ordered4.  Reassess pain after pain med administration per policy5.  If opiods administered assess patient's response to pain medication is appropriate per POSS sedation scale6.  Follow pain management plan developed in collaboration with patient and interdisciplinary team (including palliative care or pain specialists if applicable)  Outcome: Progressing     Problem: Knowledge Deficit - Standard  Goal: Patient and family/care givers will demonstrate understanding of plan of care, disease process/condition, diagnostic tests and medications  Description: Target End Date:  1-3 days or as soon as patient condition allowsDocument in Patient Education1.  Patient and family/caregiver oriented to unit, equipment, visitation policy and means for communicating concern2.  Complete/review Learning Assessment3.  Assess knowledge level of disease process/condition, treatment plan, diagnostic tests and medications4.  Explain disease process/condition, treatment plan, diagnostic tests and medications  Outcome: Progressing  Note: Plan of care discussed with patent. He is alert and oriented times four.  Discussed medications administration, fall risk, oral care, mobility, safety, etc. Per MD, patient does not have endocarditis. Plan to start antibiotics this  evening.      Problem: Hemodynamics  Goal: Patient's hemodynamics, fluid balance and neurologic status will be stable or improve  Description: Target End Date:  Prior to discharge or change in level of careDocument on Assessment and I/O flowsheet templates1.  Monitor vital signs, pulse oximetry and cardiac monitor per provider order and/or policy2.  Maintain blood pressure per provider order3.  Hemodynamic monitoring per provider order4.  Manage IV fluids and IV infusions5.  Monitor intake and output6.  Daily weights per unit policy or provider order7.  Assess peripheral pulses and capillary refill8.  Assess color and body temperature9.  Position patient for maximum circulation/cardiac [id number]. Monitor for signs/symptoms of excessive [id number]. Assess mental status, restlessness and changes in level of [id number]. Monitor temperature and report fever or hypothermia to provider immediately. Consideration of targeted temperature management.  Outcome: Progressing     Problem: Respiratory  Goal: Patient will achieve/maintain optimum respiratory ventilation and gas exchange  Description: Target End Date:  Prior to discharge or change in level of careDocument on Assessment flowsheet1.  Assess and monitor rate, rhythm, depth and effort of respiration2.  Breath sounds assessed qshift and/or as needed3.  Assess O2 saturation, administer/titrate oxygen as ordered4.  Position patient for maximum ventilatory efficiency5.  Turn, cough, and deep breath with splinting to improve effectiveness6.  Collaborate with RT to administer medication/treatments per order7.  Encourage use of incentive spirometer and encourage patient to cough after use and utilize splinting techniques if applicable8.  Airway suctioning9.  Monitor sputum production for changes in color, consistency and [id number]. Perform frequent oral myosvhd48. Alternate physical activity with rest periods  Outcome: Progressing  Note: Patient did not need o2 within  shift. He did not report any SOB     Problem: Fall Risk  Goal: Patient will remain free from falls  Description: Target End Date:  Prior to discharge or change in level of careDocument interventions on the Ebrnabe Jamar Fall Risk Assessment1.  Assess for fall risk factors2.  Implement fall precautions  Outcome: Progressing  Note: Patient has remained free of falls this shift. Instructed patient to use call light for help. Call light always placed within reach when leaving room. Patient's room has been cleared of clutter such as cords and extra chairs.        Patient is not progressing towards the following goals:

## 2025-04-01 NOTE — ANESTHESIA TIME REPORT
Anesthesia Start and Stop Event Times       Date Time Event    4/1/2025 0853 Ready for Procedure     0854 Anesthesia Start     0920 Anesthesia Stop          Responsible Staff  04/01/25      Name Role Begin End    Abiel Mills M.D. Anesth 0854 0920          Overtime Reason:  no overtime (within assigned shift)    Comments:

## 2025-04-01 NOTE — ANESTHESIA PREPROCEDURE EVALUATION
Date/Time: 04/01/25 0900    Scheduled providers: Rl Garcia M.D.    Procedure: EC-MACKENZIE W/O CONT    Diagnosis:       Sepsis (HCC) [A41.9]      Sepsis (HCC) [A41.9]    Indications: Endocarditis    Location: [facility] - Echocardiology [facility]            Relevant Problems   CARDIAC   (positive) Coronary artery disease   (positive) Hypertension   (positive) NSTEMI (non-ST elevated myocardial infarction) (HCC)   (positive) Presence of stent in coronary artery         (positive) Acute renal failure superimposed on stage 3b chronic kidney disease (HCC)   (positive) CKD (chronic kidney disease) stage 5, GFR less than 15 ml/min (HCC)   (positive) ESRD (end stage renal disease) on dialysis (HCC)   (positive) Hypertensive heart and chronic kidney disease with heart failure and stage 1 through stage 4 chronic kidney disease, or unspecified chronic kidney disease (HCC)   (positive) Type 2 diabetes mellitus with kidney complication, with long-term current use of insulin (HCC)      ENDO   (positive) Type 2 diabetes mellitus (HCC)   (positive) Type 2 diabetes mellitus with kidney complication, with long-term current use of insulin (HCC)      Other   (positive) Osteomyelitis and soft tissue infection of left foot (HCC)       Physical Exam    Airway   Mallampati: II  TM distance: >3 FB  Neck ROM: full       Cardiovascular - normal exam  Rhythm: regular  Rate: normal  (-) murmur     Dental - normal exam           Pulmonary - normal exam  Breath sounds clear to auscultation     Abdominal    Neurological - normal exam                   Anesthesia Plan    ASA 3   ASA physical status 3 criteria: diabetes - poorly controlled, ESRD undergoing regularly scheduled dialysis, CAD (>3 months) and hypertension - poorly controlled    Plan - MAC               Induction: intravenous    Postoperative Plan: Postoperative administration of opioids is intended.    Pertinent diagnostic labs and testing  reviewed    Informed Consent:    Anesthetic plan and risks discussed with patient.    Use of blood products discussed with: patient whom consented to blood products.

## 2025-04-01 NOTE — PROGRESS NOTES
[facility] Internal Medicine Daily Progress Note    Date of Service  4/1/2025    [facility] Team: R IM Purple Team   Attending: RIKY Kirkland M.d.  Senior Resident: Dr. Juan Carlos Greenberg  Intern:  Dr. Preeti Mark  Contact Number: [phone]    Chief Complaint  Beni Moore is a 54 y.o. male admitted 3/28/2025 with left leg cellulitis, group A streptococcal bacteremia    Hospital Course  Beni Moore is a 54 y.o. male admitted 03/28/2025 for left calf diabetic foot with cellulitis. He has a PMHx of IDDM w/diabetic enteropathy-related diarrhea, ESRD on ED, Hypertension, Dyslipidemia, s/p left BKA, s/p right transmetatarsal amputation, CAD s/p stent.   He was found to have a left lower extremity cellulitis, and have group a streptococcal bacteremia.  Antibiotics were narrowed to ampicillin however echocardiogram showing new valvular pathology especially tricuspid regurgitation, mitral regurgitation, and new mobile echodensity of aortic valve consistent with endocarditis.  Cardiology and infectious disease have been now consulted.  Antibiotics escalated to Ancef, appreciate recommendations from cardiology regarding aortic valvular pathology.      Interval Problem Update  -MACKENZIE (4/1) negative for vegetations, TTE false positive  -HD on M/W/F per nephrology  -Continue Cefazolin at 2g (given BMI) until 4/9 as per ID    I have discussed this patient's plan of care and discharge plan at IDT rounds today with Case Management, Nursing, Nursing leadership, and other members of the IDT team.    Consultants/Specialty  nephrology  Infectious diseases  Cardiology    Code Status  Full Code    Disposition  The patient is not medically cleared for discharge to home or a post-acute facility.      I have placed the appropriate orders for post-discharge needs.    Review of Systems  Review of Systems   Constitutional: Negative.    HENT: Negative.     Eyes: Negative.    Respiratory: Negative.     Cardiovascular: Negative.     Gastrointestinal: Negative.    Musculoskeletal:  Positive for myalgias.   Skin:         Wound on lateral aspect of left leg, draining serosanguinous discharge.   Neurological: Negative.    Psychiatric/Behavioral: Negative.          Physical Exam  Temp:  [36.1 °C (96.9 °F)-37 °C (98.6 °F)] 36.5 °C (97.7 °F)  Pulse:  [73-83] 78  Resp:  [14-18] 16  BP: (147-184)/(75-96) 175/94  SpO2:  [93 %-100 %] 98 %    Physical Exam  Vitals and nursing note reviewed.   Constitutional:       General: He is not in acute distress.     Appearance: Normal appearance. He is not ill-appearing.   HENT:      Head: Normocephalic.      Right Ear: External ear normal.      Left Ear: External ear normal.      Mouth/Throat:      Mouth: Mucous membranes are moist.   Cardiovascular:      Rate and Rhythm: Normal rate.      Pulses: Normal pulses.      Heart sounds: Normal heart sounds.   Pulmonary:      Effort: Pulmonary effort is normal. No respiratory distress.      Breath sounds: Normal breath sounds.   Abdominal:      General: Abdomen is flat. There is no distension.      Palpations: Abdomen is soft.   Musculoskeletal:         General: Swelling and deformity present.      Left lower leg: Edema present.      Comments: BKA amputation left; Right 5th toe s/p metatarsal ray amputation   Skin:     General: Skin is warm and dry.      Capillary Refill: Capillary refill takes less than 2 seconds.      Findings: Lesion present.      Comments: Wound on lateral aspect of left leg, draining serosanguinous discharge.   Neurological:      General: No focal deficit present.      Mental Status: He is alert.   Psychiatric:         Mood and Affect: Mood normal.         Behavior: Behavior normal.         Thought Content: Thought content normal.         Fluids    Intake/Output Summary (Last 24 hours) at 4/1/2025 1249  Last data filed at 4/1/2025 0800  Gross per 24 hour   Intake 1000 ml   Output 4900 ml   Net -3900 ml       Laboratory  Recent Labs      03/30/25 0212 03/31/25  0156 04/01/25  0410   WBC 6.2 6.8 5.2   RBC 2.59* 2.68* 2.80*   HEMOGLOBIN 8.1* 8.1* 8.4*   HEMATOCRIT 24.6* 25.4* 25.7*   MCV 95.0 94.8 91.8   MCH 31.3 30.2 30.0   MCHC 32.9 31.9* 32.7   RDW 49.1 47.5 44.3   PLATELETCT 128* 128* 127*   MPV 10.8 10.6 10.4     Recent Labs     03/30/25  0212 03/31/25  0156 04/01/25  0410   SODIUM 133* 131* 133*   POTASSIUM 4.2 4.2 4.2   CHLORIDE 97 96 97   CO2 26 22 23   GLUCOSE 109* 160* 154*   BUN 38* 50* 34*   CREATININE 5.84* 7.23* 5.91*   CALCIUM 8.0* 7.7* 7.7*                     Imaging  EC-MACKENZIE W/O CONT         EC-ECHOCARDIOGRAM COMPLETE W/O CONT   Final Result      CT-LOWER EXTREMITY UNILATERAL WITH CONTRAST LEFT   Final Result         1. Subcutaneous edema. This may be seen with cellulitis.   2. No drainable abscess is evident.   3. Below the knee amputation without evidence for osteomyelitis.      DX-CHEST-PORTABLE (1 VIEW)   Final Result      1.  Right lower lobe atelectasis and/or pneumonitis..      IR-CVC TUNNEL W/O PORT REMOVAL    (Results Pending)        Assessment/Plan  Problem Representation:    * Acute bacterial endocarditis- (present on admission)  Assessment & Plan  Likely secondary to group A streptococcus.  Mobile density noted on the left cusp of aortic valve.  -Cardiology consulted; appreciate recommendations.  -MACKENZIE on 4/1 negative for vegetations, TTE false positive    Cellulitis of left thigh- (present on admission)  Assessment & Plan  Left, lateral leg open wound with serosanguineous drainage.  Surrounding erythema. Associated sepsis.   --Patient is on Cefazolin 2g, will continue till 4/9. Will likely need to coordinate this care with dialysis as patient currently going Monday Wednesday Friday and has vascular access for this.  -Wound care consult, following  -CT scan s/o cellulitis, ruled out osteomyelitis    Anemia- (present on admission)  Assessment & Plan  Known normocytic anemia of chronic disease  -s/p 2 units transfusion  inpatient    Streptococcal bacteremia- (present on admission)  Assessment & Plan  -Blood culture growing Strep pyogens. Ancef per infectious disease  -See endocarditis problem.     ESRD (end stage renal disease) on dialysis ([facility])- (present on admission)  Assessment & Plan  Reportedly dialysis Mon and Wed only outpatient.   Had extra dialysis session (03/27/25) per Nephrology's recommendation d/t volume overload.    No hyperkalemia, or other evidence of fluid overload.   -HD on M/W/F inpatient per nephrology. appreciate recommendations    Sepsis ([facility])- (present on admission)  Assessment & Plan  This is Sepsis Present on admission  SIRS criteria identified on my evaluation include: Tachycardia, with heart rate greater than 90 BPM and Leukocytosis, with WBC greater than 12,000  Clinical indicators of end organ dysfunction include Acute Respiratory Failure - (mechanical ventilation or BiPap or PaO2/FiO2 ratio < 300)  Source is Skin  Sepsis protocol initiated  Crystalloid Fluid Administration: restricted d/t ESRD on HD  IV antibiotics as appropriate for source of sepsis  Reassessment: I have reassessed the patient's hemodynamic status  Prior staph cellulitis.  - Continue Ancef, see bacteremia plan  - Telemetry monitoring.   - MRSA nares,negative  - MAP goal > 65 mm Hg.     Diabetic enteropathy ([facility])- (present on admission)  Assessment & Plan  Chronic, intermittent diarrhea.  Per patient, on prescribed cholestyramine to control diarrhea.  - Resume cholestyramine inpatient.     Type 2 diabetes mellitus ([facility])- (present on admission)  Assessment & Plan  Complicated by diabetic retinopathy, nephropathy, and enteropathy.   Insulin-dependent. Compliant.   A1c: 6.5% this week, as per patient's report at Endo office visit.   Glucose at home reported 's.   - Lispro sliding scale.   - Hypoglycemia responsive measures in place.     Coronary artery disease- (present on admission)  Assessment & Plan  S/p stent placement.   No  concerns for ACS.   - Continue Atorvastatin 80 mg daily.   - Telemetry monitoring.     Hypertension- (present on admission)  Assessment & Plan  Associated ESRD.   Elevated BP on presentation in setting of pain and acute distress.   Received Hydralazine IV x2.   BP now 140-160's / 60-80's.   No sign/symptoms of end organ damage.   - Continue Losartan 100 mg, torsemide 100mg, metalazone 5mg inpatient.  - Increase amlodipine to 7.5mg daily  - Telemetry monitoring.  - Labetalol 10 mg IV q 4 hrs PRN.     S/P transmetatarsal amputation of foot, right (HCC)- (present on admission)  Assessment & Plan  Known history.   No open or infected wounds.   - Fall precautions.          VTE prophylaxis: SCDs/TEDs Hold Heparin for low Hb    I have performed a physical exam and reviewed and updated ROS and Plan today (4/1/2025). In review of yesterday's note (3/31/2025), there are no changes except as documented above.

## 2025-04-01 NOTE — PROCEDURES
Date of service: 03/31/25    Diagnosis: End-Stage Renal Disease, admitted with cellulitis, streptococcal bacteremia, endocarditis. Patient seen and examined on hemodialysis during treatment. Patient is stable, tolerating hemodialysis. Denies chest pain and shortness of breath. Orders updated as needed. Please refer to flowsheet for full details.    Access: Left arm AV fistula, accessed with 16-gauge needles.  Patient also has right IJ permacath.  UF goal: 3 to 4 L as tolerated    Plan: Continue hemodialysis Monday Wednesday Friday while inpatient.  AV fistula is mature, and accommodating 16-gauge needles.  Recommend removal of tunneled dialysis catheter.    Discussed with Dr. Juan Carlos Ortiz MD  Nephrology   [facility]

## 2025-04-01 NOTE — PROGRESS NOTES
[facility] Follow-up Consult Note  Date of note:    4/1/2025          Patient ID:  Name:   Bnei Moore   YOB: 1970  Age:   54 y.o.  male   MRN:   [id number]    Chief Complaint   Patient presents with    Wound Check     Pt w L BKA. Pt has a mis fitting prosthesis which has rubbed a sore.     Fever       Interim Events:  Patient seen before and after MACKENZIE no issues overnight    During MACKENZIE did have brief run of atrial tachycardia rate 120 this self resolved      Past medical, surgical, social, and family history reviewed and unchanged from admission except as noted in assessment and plan.    Medications: Reviewed in MAR  Current Facility-Administered Medications   Medication Dose Frequency Provider Last Rate Last Admin    [START ON 4/2/2025] amLODIPine (Norvasc) tablet 7.5 mg  7.5 mg Q DAY Preeti Mark M.D.        lactated ringers infusion   Continuous Abiel Mills M.D. 50 mL/hr at 04/01/25 0920 Rate Verify at 04/01/25 0920    ceFAZolin (Ancef) 2 g in  mL IVPB  2 g Q EVENING Preeti Mark M.D.        sevelamer carbonate (Renvela) tablet 1,600 mg  1,600 mg TID WITH MEALS Varun Ortiz M.D.        oxyCODONE immediate-release (Roxicodone) tablet 2.5 mg  2.5 mg Q6HRS PRN Juan Carlos Greenberg D.O.        Or    oxyCODONE immediate-release (Roxicodone) tablet 5 mg  5 mg Q6HRS PRN Juan Carlos Greenberg, D.O.   5 mg at 04/01/25 0209    Or    HYDROmorphone (Dilaudid) injection 0.25 mg  0.25 mg Q4HRS PRN Juan Carlos Greenberg, D.O.   0.25 mg at 03/30/25 2255    epoetin (Retacrit) 5,000 Units injection (Dialysis Use Only)  5,000 Units MO, WE + FR Melyssa Plascencia M.D.   5,000 Units at 03/31/25 1624    insulin GLARGINE (Lantus,Semglee) injection  18 Units QHS Newton Watson M.D.        aspirin EC tablet 81 mg  81 mg DAILY Preeti Sahulan MAsiyaDAsiya   81 mg at 03/31/25 0550    NS (Bolus) 0.9 % infusion 100 mL  100 mL DIALYSIS PRN Melyssa Plascencia M.D.        heparin intracatheter (for DIALYSIS USE ONLY) 1,800 Units   1,800 Units DIALYSIS PRN Melyssa Plascencia M.D.   1,800 Units at 03/29/25 1317    heparin intracatheter (for DIALYSIS USE ONLY) 1,800 Units  1,800 Units DIALYSIS PRN Melyssa Plascencia M.D.   1,800 Units at 03/29/25 1317    atorvastatin (Lipitor) tablet 80 mg  80 mg Q EVENING Newton Watson M.D.   80 mg at 03/31/25 2007    cholestyramine (Questran) 4 GM powder 4 g  1 Packet BID Newton Watson M.D.   4 g at 03/31/25 1839    losartan (Cozaar) tablet 100 mg  100 mg DAILY Newton Watson M.D.   100 mg at 04/01/25 0551    metOLazone (Zaroxolyn) tablet 5 mg  5 mg DAILY Newton Watson M.D.   5 mg at 04/01/25 0551    torsemide (Demadex) tablet 100 mg  100 mg DAILY Newton Watson M.D.   100 mg at 04/01/25 0551    omeprazole (PriLOSEC) capsule 20 mg  20 mg QAM Newton Watson M.D.   20 mg at 04/01/25 0551    [Held by provider] heparin injection 5,000 Units  5,000 Units Q8HRS Newton Watson M.D.   5,000 Units at 03/29/25 0503    labetalol (Normodyne/Trandate) injection 10 mg  10 mg Q4HRS PRN Newton Watson M.D.   10 mg at 03/31/25 2114    acetaminophen (Tylenol) tablet 1,000 mg  1,000 mg Q6HRS PRN Newton Watson M.D.   1,000 mg at 03/29/25 0321    insulin lispro (HumaLOG,AdmeLOG) subcutaneous injection  1-6 Units 4X/DAY ACHS Newton Watson M.D.   1 Units at 03/31/25 2323    And    dextrose 50 % (D50W) injection 25 g  25 g Q15 MIN PRN Newton Watson M.D.        Pharmacy Consult Request ...Pain Management Review 1 Each  1 Each PHARMACY TO DOSE Newton J [place], M.D.        guaiFENesin dextromethorphan (Robitussin DM) 100-10 MG/5ML syrup 5 mL  5 mL Q6HRS PRN Newton Watson M.D.       Last reviewed on 3/31/2025 10:12 AM by Maritza Lopez, T   No Known Allergies    Physical Exam  Body mass index is 30.26 kg/m². BP (!) 163/91   Pulse 77   Temp 36.6 °C (97.9 °F) (Temporal)   Resp 18   Ht 1.829 m (6')   Wt 101 kg (223 lb 1.7 oz)   SpO2 98%    Vitals:    04/01/25 1425 04/01/25 1541 04/01/25 1626  04/01/25 1627   BP: (!) 165/92 (!) (P) 148/87  (!) 163/91   Pulse: 78 (P) 77 77    Resp: 18 (P) 18 18    Temp: 36.4 °C (97.5 °F) (P) 36.9 °C (98.4 °F) 36.6 °C (97.9 °F)    TempSrc: Temporal (P) Temporal Temporal    SpO2: 98% (P) 97%     Weight:       Height:        Oxygen Therapy:  Pulse Oximetry: (P) 97 %, O2 (LPM): 0, O2 Delivery Device: (P) None - Room Air    General: no apparent distress  Eyes: nl conjunctiva  ENT: Normal no dehydration  Neck: no JVD   Lungs: normal respiratory effort  Heart: RRR  EXT noedema bilateral lower extremities.  no cyanosis  Abdomen: soft, non tender, non distended,  Neurological: No focal deficits  Psychiatric: Appropriate affect,   Skin: Warm extremities    Labs (personally reviewed and notable for):   Recent Results (from the past 24 hours)   POCT glucose device results    Collection Time: 03/31/25  6:21 PM   Result Value Ref Range    POC Glucose, Blood 128 (H) 65 - 99 mg/dL   POCT glucose device results    Collection Time: 03/31/25 11:19 PM   Result Value Ref Range    POC Glucose, Blood 156 (H) 65 - 99 mg/dL   Comp Metabolic Panel    Collection Time: 04/01/25  4:10 AM   Result Value Ref Range    Sodium 133 (L) 135 - 145 mmol/L    Potassium 4.2 3.6 - 5.5 mmol/L    Chloride 97 96 - 112 mmol/L    Co2 23 20 - 33 mmol/L    Anion Gap 13.0 7.0 - 16.0    Glucose 154 (H) 65 - 99 mg/dL    Bun 34 (H) 8 - 22 mg/dL    Creatinine 5.91 (HH) 0.50 - 1.40 mg/dL    Calcium 7.7 (L) 8.5 - 10.5 mg/dL    Correct Calcium 8.4 (L) 8.5 - 10.5 mg/dL    AST(SGOT) 17 12 - 45 U/L    ALT(SGPT) <5 2 - 50 U/L    Alkaline Phosphatase 54 30 - 99 U/L    Total Bilirubin 1.0 0.1 - 1.5 mg/dL    Albumin 3.1 (L) 3.2 - 4.9 g/dL    Total Protein 6.2 6.0 - 8.2 g/dL    Globulin 3.1 1.9 - 3.5 g/dL    A-G Ratio 1.0 g/dL   CBC WITHOUT DIFFERENTIAL    Collection Time: 04/01/25  4:10 AM   Result Value Ref Range    WBC 5.2 4.8 - 10.8 K/uL    RBC 2.80 (L) 4.70 - 6.10 M/uL    Hemoglobin 8.4 (L) 14.0 - 18.0 g/dL    Hematocrit 25.7 (L)  42.0 - 52.0 %    MCV 91.8 81.4 - 97.8 fL    MCH 30.0 27.0 - 33.0 pg    MCHC 32.7 32.3 - 36.5 g/dL    RDW 44.3 35.9 - 50.0 fL    Platelet Count 127 (L) 164 - 446 K/uL    MPV 10.4 9.0 - 12.9 fL   ESTIMATED GFR    Collection Time: 04/01/25  4:10 AM   Result Value Ref Range    GFR (CKD-EPI) 10 (A) >60 mL/min/1.73 m 2   POCT glucose device results    Collection Time: 04/01/25  7:37 AM   Result Value Ref Range    POC Glucose, Blood 130 (H) 65 - 99 mg/dL   POCT glucose device results    Collection Time: 04/01/25 12:36 PM   Result Value Ref Range    POC Glucose, Blood 126 (H) 65 - 99 mg/dL       Cardiac Imaging and Procedures Review:    EKG and telemetry tracings personally reviewed    Impression and Medical Decision Making:  Principal Problem:    Streptococcal bacteremia (POA: Yes)  Active Problems:    S/P transmetatarsal amputation of foot, right (HCC) (POA: Yes)    Hypertension (Chronic) (POA: Yes)    Coronary artery disease due to lipid rich plaque - post PCI to RCA in 2019 (Chronic) (POA: Yes)    Type 2 diabetes mellitus (HCC) (POA: Yes)    Diabetic enteropathy (HCC) (POA: Yes)    Anemia (POA: Yes)    Sepsis (HCC) (POA: Yes)    ESRD (end stage renal disease) on dialysis ([facility]) (POA: Yes)    Cellulitis of left thigh (POA: Yes)    Moderate mitral regurgitation (Chronic) (POA: Yes)    Paroxysmal SVT (supraventricular tachycardia) ([facility]) -on telemetry and during MACKENZIE atrial tachycardia likely (Chronic) (POA: Clinically Undetermined)  Resolved Problems:    * No resolved hospital problems. *    Bacteremia  Fortunately no evidence of endocarditis on MACKENZIE which was high-quality, his transthoracic is likely artifact versus calcification    If he has a change in clinical status or recurrent bacteremia certainly reasonable Boal to repeat the MACKENZIE but overall seems low risk for development of endocarditis which is favorable    Moderate mitral regurgitation  This is due to degenerative changes related to his dialysis status we will  continue to monitor long-term we discussed typically every 3 years or so when echo    Coronary disease   statin and aspirin    Paroxysmal SVT  Monitor over time low threshold for outpatient monitor for symptoms    I personally discussed his case with  Dr Juan Carlos Greenberg and Rosa Crockett    Encouraged him to reestablish care in cardiology office I would see him in a year cardiology will sign off      It is my pleasure to participate in the care of Mr. Moore.  Please do not hesitate to contact me with questions or concerns.    Rl Garcia MD PhD Located within [facility]  Cardiologist [facility]    4/1/2025    Please note that this dictation was created using voice recognition software. There may be errors of grammar and possibly content I did not discover before finalizing the note.

## 2025-04-01 NOTE — PROGRESS NOTES
Per monitor tech, patient had 11 beats of Vtach. Patient denies any symptoms. Vital signs taken and stable.

## 2025-04-01 NOTE — PROGRESS NOTES
3.5hr HD today ordered by DR Ortiz, started @ 1349 and completed @ 1720. Tx well tolerated,net UF = 4000ml, BP still elevated despite of UF, denied HA or CP. LUAAVF functioned well ,+ B/T,cannulation sites covered with DD,CDI,report given to Frida Taylor RN.

## 2025-04-01 NOTE — PROGRESS NOTES
TELEMETRY STRIP SUMMARY:    Rhythm: SR  HR: 76-82 bpm  Ectopy: fPVC, rCOUP    MS: 0.14  QRS: 0.10  QT: 0.40

## 2025-04-01 NOTE — CARE PLAN
The patient is Stable - Low risk of patient condition declining or worsening    Shift Goals  Clinical Goals: NPO at mn; pain management; monitor vitals  Patient Goals: pain management; rest  Family Goals: JENNIFER    Progress made toward(s) clinical / shift goals:    Problem: Pain - Standard  Goal: Alleviation of pain or a reduction in pain to the patient’s comfort goal  Outcome: Progressing     Problem: Knowledge Deficit - Standard  Goal: Patient and family/care givers will demonstrate understanding of plan of care, disease process/condition, diagnostic tests and medications  Outcome: Progressing  Note: Instructed on NPO at midnight.

## 2025-04-01 NOTE — ANESTHESIA POSTPROCEDURE EVALUATION
Patient: Beni Moore    Procedure Summary       Date: 04/01/25 Room / Location: [facility] - Echocardiology [facility]    Anesthesia Start: 0854 Anesthesia Stop: 0920    Procedure: EC-MACKENZIE W/O CONT Diagnosis:       Sepsis (HCC)      Sepsis (HCC)      (Endocarditis)    Scheduled Providers: Rl Garcia M.D. Responsible Provider: Abiel Mills M.D.    Anesthesia Type: MAC ASA Status: 3            Final Anesthesia Type: MAC  Last vitals  BP   Blood Pressure: (!) 147/81    Temp   36.1 °C (96.9 °F)    Pulse   77   Resp   18    SpO2   100 %      Anesthesia Post Evaluation    Patient location during evaluation: PACU  Patient participation: complete - patient participated  Level of consciousness: awake and alert    Airway patency: patent  Anesthetic complications: no  Cardiovascular status: hemodynamically stable  Respiratory status: acceptable  Hydration status: euvolemic    PONV: none          No notable events documented.     Nurse Pain Score: 0 (NPRS)

## 2025-04-01 NOTE — CONSULTS
Reason for Consult:  Asked by Dr Juan Carlos Greenberg. to see this patient with endocarditis  Patient's PCP: Florinda Pascual M.D.    CC:   Chief Complaint   Patient presents with    Wound Check     Pt w ARCENIO LYNN. Pt has a mis fitting prosthesis which has rubbed a sore.     Fever       HPI: Is a very nice 54-year-old M with diabetes end-stage renal disease on hemodialysis Monday Wednesday Friday with lower extremity wound presents with fever found to have strep bacteremia and a transthoracic echocardiogram shows mitral valve mass and aortic valve mass as well as mitral regurgitation which is worsened compared to prior    Medications / Drug list prior to admission:  No current facility-administered medications on file prior to encounter.     Current Outpatient Medications on File Prior to Encounter   Medication Sig Dispense Refill    Methoxy PEG-Epoetin Beta (MIRCERA) 75 MCG/0.3ML Solution Prefilled Syringe Inject 75 mcg as directed every 14 days. [facility] [phone]      diphenhydrAMINE-APAP, sleep, (TYLENOL PM EXTRA STRENGTH)  MG Tab Take 3-4 Tablets by mouth at bedtime as needed (pain/ sleep).      cholestyramine (QUESTRAN) 4 g packet Take 4 g by mouth 2 times a day for 360 days. 60 Each 11    torsemide (DEMADEX) 100 MG Tab Take 1 Tablet by mouth every day. 90 Tablet 3    losartan (COZAAR) 100 MG Tab Take 1 Tablet by mouth every day. 90 Tablet 3    metOLazone (ZAROXOLYN) 5 MG Tab Take 1 Tablet by mouth every day. 90 Tablet 3    calcium acetate (PHOS-LO) 667 MG Cap Take 1,334 mg by mouth 3 times a day with meals. 2 capsules= 1334mg      aspirin 81 MG EC tablet Take 1 Tablet by mouth every day. 30 Tablet 2    atorvastatin (LIPITOR) 80 MG tablet Take 1 Tablet by mouth every evening. (Patient taking differently: Take 80 mg by mouth every evening.   ) 30 Tablet 2    omeprazole (PRILOSEC) 20 MG delayed-release capsule Take 20 mg by mouth every morning.         Insulin Degludec (TRESIBA) 100 UNIT/ML Solution  Inject 24 Units under the skin at bedtime.         HUMULIN R 100 UNIT/ML Solution Inject 4-8 Units under the skin 3 times a day before meals. Sliding Scale   150 - 200 = 3 units  201 - 250 = 4 units  251 - 300 = 5 units  301 - 350 = 6 units  351 - 400 = 7 units   PLUS CARB CORRECTION  of 1 unit for every 15 g carb         Current list of administered Medications:    Current Facility-Administered Medications:     ceFAZolin in dextrose (Ancef) IVPB premix 1 g, 1 g, Intravenous, Q EVENING, Rosa Crockett M.D., Stopped at 03/31/25 1828    amLODIPine (Norvasc) tablet 5 mg, 5 mg, Oral, Q DAY, Melyssa Plascencia M.D., 5 mg at 03/31/25 0550    oxyCODONE immediate-release (Roxicodone) tablet 2.5 mg, 2.5 mg, Oral, Q6HRS PRN **OR** oxyCODONE immediate-release (Roxicodone) tablet 5 mg, 5 mg, Oral, Q6HRS PRN, 5 mg at 03/31/25 2007 **OR** HYDROmorphone (Dilaudid) injection 0.25 mg, 0.25 mg, Intravenous, Q4HRS PRN, Juan Carlos Greenberg D.TYLER, 0.25 mg at 03/30/25 2255    epoetin (Retacrit) 5,000 Units injection (Dialysis Use Only), 5,000 Units, Intravenous, MO, WE + FR, Melyssa Plascencia M.D., 5,000 Units at 03/31/25 1624    insulin GLARGINE (Lantus,Semglee) injection, 18 Units, Subcutaneous, QHS, Newton Watson M.D.    aspirin EC tablet 81 mg, 81 mg, Oral, DAILY, Preetimakayla Mark M.D., 81 mg at 03/31/25 0550    NS (Bolus) 0.9 % infusion 100 mL, 100 mL, Intravenous, DIALYSIS PRN, Melyssa Plascencia M.D.    heparin intracatheter (for DIALYSIS USE ONLY) 1,800 Units, 1,800 Units, Intracatheter, DIALYSIS PRN, Melyssa Plascencia M.D., 1,800 Units at 03/29/25 1317    heparin intracatheter (for DIALYSIS USE ONLY) 1,800 Units, 1,800 Units, Intracatheter, DIALYSIS PRN, Melyssa Plascencia M.D., 1,800 Units at 03/29/25 1317    atorvastatin (Lipitor) tablet 80 mg, 80 mg, Oral, Q EVENING, Newton Watson M.D., 80 mg at 03/31/25 2007    cholestyramine (Questran) 4 GM powder 4 g, 1 Packet, Oral, BID, Newton Watson M.D., 4 g at 03/31/25 1839    losartan (Cozaar)  tablet 100 mg, 100 mg, Oral, DAILY, Newton Watson M.D., 100 mg at 03/31/25 0549    metOLazone (Zaroxolyn) tablet 5 mg, 5 mg, Oral, DAILY, Newton Watson M.D., 5 mg at 03/31/25 0549    torsemide (Demadex) tablet 100 mg, 100 mg, Oral, DAILY, Netwon Watson M.D., 100 mg at 03/31/25 0550    omeprazole (PriLOSEC) capsule 20 mg, 20 mg, Oral, QAM, Newton Watson M.D., 20 mg at 03/31/25 0550    [Held by provider] heparin injection 5,000 Units, 5,000 Units, Subcutaneous, Q8HRS, Newton Watson M.D., 5,000 Units at 03/29/25 0503    labetalol (Normodyne/Trandate) injection 10 mg, 10 mg, Intravenous, Q4HRS PRN, Newton Watson M.D., 10 mg at 03/31/25 1207    acetaminophen (Tylenol) tablet 1,000 mg, 1,000 mg, Oral, Q6HRS PRN, Newton Watson M.D., 1,000 mg at 03/29/25 0321    insulin lispro (HumaLOG,AdmeLOG) subcutaneous injection, 1-6 Units, Subcutaneous, 4X/DAY ACHS, 1 Units at 03/30/25 2126 **AND** POC blood glucose manual result, , , Q AC AND BEDTIME(S) **AND** NOTIFY MD and PharmD, , , Once **AND** Administer 20 grams of glucose (approximately 8 ounces of fruit juice) every 15 minutes PRN FSBG less than 70 mg/dL, , , PRN **AND** dextrose 50 % (D50W) injection 25 g, 25 g, Intravenous, Q15 MIN PRN, Newton Watson M.D.    Pharmacy Consult Request ...Pain Management Review 1 Each, 1 Each, Other, PHARMACY TO DOSE, Newton Watson M.D.    guaiFENesin dextromethorphan (Robitussin DM) 100-10 MG/5ML syrup 5 mL, 5 mL, Oral, Q6HRS PRN, Newton Watson M.D.    Past Medical History:   Diagnosis Date    Anesthesia     Hypotension post-op, persisted for a few months x 1    Anesthesia 12/06/2023    Patient stated after 2016 colonoscopy he became hypotensive.    Bowel habit changes     History GI problems    Cataract     bilateral IOL    Deaf, right     Diabetes (HCC)     Oral medications & insulin-8/30/24 only insulin    Dialysis patient (HCC) 08/29/2024    once a week on wednesday at [facility] of  aortic valve 3/31/2025    Heart burn     GERD; takes Prilosec    Hemorrhagic disorder (HCC)     ASA protection for stent and cardiac history    High cholesterol     medicated x 2    History of non-ST elevation myocardial infarction (NSTEMI)     Per Problem List    Hx of heart artery stent 2019    Hyperlipidemia     Hyperparathyroidism due to renal insufficiency (HCC)     Per Problem List    Hypertension     medicated    Myocardial infarct (HCC) 2019    FOLLOWED BY DR MORGAN    Pneumonia     H/O    Renal disorder 12/06/2023    Stage 3 CKD; 12/9/2024 dialysis at [facility] on Wednesday's only via CVC       Past Surgical History:   Procedure Laterality Date    AV FISTULA REVISION Left 1/6/2025    Procedure: LEFT BRACHIOBASILIC FISTULA TRANSPOSITION;  Surgeon: Joyce Ureña M.D.;  Location: SURGERY SAME DAY [facility];  Service: General    AV FISTULA CREATION Left 11/04/2024    Procedure: CREATION OF LEFT UPPER EXTREMITY  DIALYSIS FISTULA;  Surgeon: Joyce Ureña M.D.;  Location: SURGERY SAME DAY [facility];  Service: General    ORIF, FRACTURE, HUMERUS, PROXIMAL Left 08/30/2024    Procedure: LEFT OPEN REDUCTION INTERNAL FIXATION  HUMERUS, PROXIMAL, NON UNION REPAIR;  Surgeon: Aris Pierre M.D.;  Location: SURGERY [facility];  Service: Orthopedics    KNEE AMPUTATION BELOW Left 02/01/2024    Procedure: AMPUTATION, BELOW KNEE;  Surgeon: Celestino Segura M.D.;  Location: [facility];  Service: Orthopedics    TOE AMPUTATION Left 01/30/2024    Procedure: AMPUTATION, TOE- 5TH;  Surgeon: Celestino Segura M.D.;  Location: [facility];  Service: Orthopedics    PB OPEN TREATMENT PBOX HUMERAL FRACTURE Left 12/11/2023    Procedure: LEFT OPEN REDUCTION INTERNAL FIXATION  HUMERUS, PROXIMAL AND LEFT BICEPS TENODESIS;  Surgeon: Aris Pierre M.D.;  Location: SURGERY [facility];  Service: Orthopedics    PB REPAIR BICEPS LONG TENDON Left 12/11/2023    Procedure: TENODESIS, BICEPS, OPEN;  Surgeon:  Aris Pierre M.D.;  Location: SURGERY [facility];  Service: Orthopedics    CATARACT EXTRACTION WITH IOL Bilateral     TOE AMPUTATION Left 2021    Procedure: AMPUTATION, TOE;  Surgeon: Tomer Hart M.D.;  Location: SURGERY [facility];  Service: Orthopedics    STENT PLACEMENT  2019    STEMI with nonobstructive LAD    TOE AMPUTATION Right 2018    Procedure: Transmetatarsal amputation;  Surgeon: Ravi Bernardo M.D.;  Location: SURGERY [facility];  Service: Orthopedics    ACHILLES TENDON REPAIR Right 2018    Procedure: ACHILLES LENGTHENING;  Surgeon: Ravi Bernardo M.D.;  Location: SURGERY [facility];  Service: Orthopedics    IRRIGATION & DEBRIDEMENT ORTHO Right 2018    Procedure: IRRIGATION & DEBRIDEMENT ORTHO;  Surgeon: Ravi Bernardo M.D.;  Location: SURGERY [facility];  Service: Orthopedics    OTHER CARDIAC SURGERY  2019    Stent installed right side    OTHER ORTHOPEDIC SURGERY      SHOULDER SURGERY  2023    Something is not right in my shoulder/arm    TONSILLECTOMY      VASECTOMY         Family History   Problem Relation Age of Onset    No Known Problems Mother     Diabetes Father         Adult onset    Heart Disease Father     Hypertension Father     Diabetes Maternal Grandmother     Heart Disease Maternal Grandfather     Lung Disease Paternal Grandmother     Cancer Paternal Grandmother     Cancer Paternal Grandfather     Lung Cancer Paternal Grandfather     Kidney Disease Neg Hx      Patient family history was personally reviewed, no pertinent family history to current presentation    Social History     Tobacco Use    Smoking status: Former     Current packs/day: 0.00     Average packs/day: 0.6 packs/day for 17.0 years (10.0 ttl pk-yrs)     Types: Cigarettes     Start date: 1988     Quit date:      Years since quittin.2     Passive exposure: Never    Smokeless tobacco: Never   Vaping Use    Vaping status: Never Used   Substance  Use Topics    Alcohol use: Not Currently     Comment: Quit 2009    Drug use: Not Currently     Types: Oral     Comment: NOT CURRENTLY, HISTORY OF CBD       ALLERGIES:  No Known Allergies    Review of systems:  A presentation focused review of symptoms was reviewed with patient. This is reviewed in H&P and PMH. ALL OTHERS reviewed and negative    Physical exam:  Patient Vitals for the past 24 hrs:   BP Temp Temp src Pulse Resp SpO2 Weight   03/31/25 2105 (!) (P) 184/81 (P) 36.7 °C (98.1 °F) (P) Temporal (P) 73 (P) 17 (P) 93 % --   03/31/25 2007 -- -- -- -- 18 -- --   03/31/25 1148 (!) 188/88 36.4 °C (97.5 °F) Temporal 74 17 92 % --   03/31/25 0901 (!) 188/79 36.7 °C (98.1 °F) Temporal 75 18 94 % --   03/31/25 0514 -- -- -- -- -- -- 102 kg (224 lb 10.4 oz)   03/31/25 0512 (!) 151/98 36.5 °C (97.7 °F) Temporal 74 19 98 % --   03/31/25 0159 -- -- -- -- 19 -- --   03/30/25 2322 (!) 163/83 36.7 °C (98.1 °F) Temporal 73 17 94 % --     General: No acute distress.   EYES: no jaundice  HEENT: OP clear   Neck:  No JVD.   CVS:  [ RRR.   Resp: Normal respiratory effort,   Abdomen: ND,  Skin: Grossly nothing acute no obvious rashes  Neurological: Alert, Moves all extremities  Extremities:   [Mild edema. No cyanosis.  Status post amputation with a large bandage on the lower left leg      Data:  Laboratory studies personally reviewed by me:  Recent Results (from the past 24 hours)   POCT glucose device results    Collection Time: 03/30/25  9:23 PM   Result Value Ref Range    POC Glucose, Blood 152 (H) 65 - 99 mg/dL   Comp Metabolic Panel    Collection Time: 03/31/25  1:56 AM   Result Value Ref Range    Sodium 131 (L) 135 - 145 mmol/L    Potassium 4.2 3.6 - 5.5 mmol/L    Chloride 96 96 - 112 mmol/L    Co2 22 20 - 33 mmol/L    Anion Gap 13.0 7.0 - 16.0    Glucose 160 (H) 65 - 99 mg/dL    Bun 50 (H) 8 - 22 mg/dL    Creatinine 7.23 (HH) 0.50 - 1.40 mg/dL    Calcium 7.7 (L) 8.5 - 10.5 mg/dL    Correct Calcium 8.5 8.5 - 10.5 mg/dL     AST(SGOT) 15 12 - 45 U/L    ALT(SGPT) <5 2 - 50 U/L    Alkaline Phosphatase 50 30 - 99 U/L    Total Bilirubin 1.0 0.1 - 1.5 mg/dL    Albumin 3.0 (L) 3.2 - 4.9 g/dL    Total Protein 6.0 6.0 - 8.2 g/dL    Globulin 3.0 1.9 - 3.5 g/dL    A-G Ratio 1.0 g/dL   CBC WITHOUT DIFFERENTIAL    Collection Time: 03/31/25  1:56 AM   Result Value Ref Range    WBC 6.8 4.8 - 10.8 K/uL    RBC 2.68 (L) 4.70 - 6.10 M/uL    Hemoglobin 8.1 (L) 14.0 - 18.0 g/dL    Hematocrit 25.4 (L) 42.0 - 52.0 %    MCV 94.8 81.4 - 97.8 fL    MCH 30.2 27.0 - 33.0 pg    MCHC 31.9 (L) 32.3 - 36.5 g/dL    RDW 47.5 35.9 - 50.0 fL    Platelet Count 128 (L) 164 - 446 K/uL    MPV 10.6 9.0 - 12.9 fL   ESTIMATED GFR    Collection Time: 03/31/25  1:56 AM   Result Value Ref Range    GFR (CKD-EPI) 8 (A) >60 mL/min/1.73 m 2   POCT glucose device results    Collection Time: 03/31/25  9:35 AM   Result Value Ref Range    POC Glucose, Blood 129 (H) 65 - 99 mg/dL   POCT glucose device results    Collection Time: 03/31/25 12:07 PM   Result Value Ref Range    POC Glucose, Blood 149 (H) 65 - 99 mg/dL   BLOOD CULTURE    Collection Time: 03/31/25 12:55 PM    Specimen: Peripheral; Blood   Result Value Ref Range    Significant Indicator NEG     Source BLD     Site PERIPHERAL     Culture Result       No Growth  Note: Blood cultures are incubated for 5 days and  are monitored continuously.Positive blood cultures  are called to the RN and reported as soon as  they are identified.     BLOOD CULTURE    Collection Time: 03/31/25 12:57 PM    Specimen: Peripheral; Blood   Result Value Ref Range    Significant Indicator NEG     Source BLD     Site PERIPHERAL     Culture Result       No Growth  Note: Blood cultures are incubated for 5 days and  are monitored continuously.Positive blood cultures  are called to the RN and reported as soon as  they are identified.     POCT glucose device results    Collection Time: 03/31/25  6:21 PM   Result Value Ref Range    POC Glucose, Blood 128 (H) 65 -  99 mg/dL       Imaging:  EC-ECHOCARDIOGRAM COMPLETE W/O CONT   Final Result      CT-LOWER EXTREMITY UNILATERAL WITH CONTRAST LEFT   Final Result         1. Subcutaneous edema. This may be seen with cellulitis.   2. No drainable abscess is evident.   3. Below the knee amputation without evidence for osteomyelitis.      DX-CHEST-PORTABLE (1 VIEW)   Final Result      1.  Right lower lobe atelectasis and/or pneumonitis..      IR-CVC TUNNEL W/O PORT REMOVAL    (Results Pending)   EC-MACKENZIE W/O CONT    (Results Pending)           EKG tracings personally reviewed by me sinus rhythm with normal intervals    Echocardiogram images personally reviewed by me show mitral valve mass consistent with vegetation and moderate mitral regurgitation central line in the right atrium    All pertinent features of laboratory and imaging reviewed including primary images where applicable      Principal Problem:    Acute bacterial endocarditis (POA: Yes)      Overview: Strep pyogenes  Active Problems:    S/P transmetatarsal amputation of foot, right (HCC) (POA: Yes)    Hypertension (Chronic) (POA: Yes)    Coronary artery disease (POA: Yes)    Type 2 diabetes mellitus (HCC) (POA: Yes)    Diabetic enteropathy (HCC) (POA: Yes)    Anemia (POA: Yes)    Sepsis (HCC) (POA: Yes)    ESRD (end stage renal disease) on dialysis (HCC) (POA: Yes)    Cellulitis of left thigh (POA: Yes)    Streptococcal bacteremia (POA: Yes)  Resolved Problems:    * No resolved hospital problems. *      Assessment / Plan:  Endocarditis  Given the moderate mitral regurgitation will do a MACKENZIE for establishing a baseline and then likely repeat after clinical course of IV antibiotics  Hopeful to arrange for MACKENZIE tomorrow, n.p.o. in the morning    History of coronary disease status post stenting  Aspirin statin losartan    I reinforced the importance of regular follow-up he had been seeing us but had not in a couple years we did discuss missing end-stage renal disease the treatment  options are reduced for certain issues but certainly important now to have long-term follow-up on his mitral valve.    Future Appointments   Date Time Provider Department Center   4/4/2025  8:40 AM [facility]       It is my pleasure to participate in the care of Mr. Moore.  Please do not hesitate to contact me with questions or concerns.    Rl Garcia MD PhD [facility]  Cardiologist [facility]    3/31/2025    Please note that this dictation was created using voice recognition software. There may be errors I did not discover before finalizing the note.

## 2025-04-01 NOTE — PROGRESS NOTES
[facility] INTERNAL MEDICINE PROGRESS NOTE - STUDENT NOTE     Attending:   Dr. Pelon Kirkland    Senior Resident:  Dr. Juan Carlos Greenberg    Resident:   Dr. Preeti Mark    PATIENT:   Beni Moore; [id number]; 1970    CC: L calf and diabetic foot ulcer with cellulitis      HOSPITAL COURSE: Beni Moore is a 54 year old male admitted for L calf and diabetic foot with cellulitis, growing group A bacteremia, on admission day 4. He presented with an ulcer from a poor-fitting prosthesis that began to gradually worsen with pain and swelling with purulence.     PMH includes IDDM w/ enteropathy-related diarrhea, ESRD on HD, HTN, dyslipidemia, s/p BKA, s/p R transmetatarsal amputation, CAD s/p stent placement.       INTERVAL UPDATES  No acute events overnight. Patient was not evaluated by me as he was in procedure room during this time.     Review of Systems   All other systems reviewed and are negative.       OBJECTIVE:  Vitals:    04/01/25 1125 04/01/25 1157 04/01/25 1225 04/01/25 1325   BP: (!) 167/93 (!) 175/94     Pulse: 77 76 78 82   Resp: 18 18 16 18   Temp: 36.5 °C (97.7 °F) 36.7 °C (98.1 °F) 36.5 °C (97.7 °F) 36.4 °C (97.6 °F)   TempSrc: Temporal Temporal Temporal Temporal   SpO2: 96% 98% 98% 97%   Weight:       Height:           Intake/Output Summary (Last 24 hours) at 4/1/2025 1356  Last data filed at 4/1/2025 1200  Gross per 24 hour   Intake 1800 ml   Output 4900 ml   Net -3100 ml       PHYSICAL EXAM:   General: No acute distress, afebrile, resting comfortably, conversational   HEENT: NC/AT. EOMI.   Cardiovascular: RRR without murmurs, rubs, heaves. Normal capillary refill   Respiratory: CTAB, no tachypnea or retractions   Abdomen: normal bowel sounds, soft, nontender, nondistended, no masses, no organomegaly   EXT:  R foot amputation down to PIPs, L BKA, bandages clean, dry, and intact  Skin: Erythema noted in L knee with bandages intact, tender to palpation  Neuro: Non-focal, alert and orientated       LABS:  Recent  "Labs     03/30/25 0212 03/31/25 0156 04/01/25  0410   WBC 6.2 6.8 5.2   RBC 2.59* 2.68* 2.80*   HEMOGLOBIN 8.1* 8.1* 8.4*   HEMATOCRIT 24.6* 25.4* 25.7*   MCV 95.0 94.8 91.8   MCH 31.3 30.2 30.0   RDW 49.1 47.5 44.3   PLATELETCT 128* 128* 127*   MPV 10.8 10.6 10.4     Recent Labs     03/30/25 0212 03/31/25 0156 04/01/25  0410   SODIUM 133* 131* 133*   POTASSIUM 4.2 4.2 4.2   CHLORIDE 97 96 97   CO2 26 22 23   BUN 38* 50* 34*   CREATININE 5.84* 7.23* 5.91*   CALCIUM 8.0* 7.7* 7.7*   ALBUMIN 3.1* 3.0* 3.1*     Estimated GFR/CRCL = Estimated Creatinine Clearance: 17.2 mL/min (A) (by C-G formula based on SCr of 5.91 mg/dL ()).  Recent Labs     03/30/25 0212 03/31/25 0156 04/01/25  0410   GLUCOSE 109* 160* 154*     Recent Labs     03/30/25 0212 03/31/25 0156 04/01/25  0410   ASTSGOT 16 15 17   ALTSGPT 8 <5 <5   TBILIRUBIN 1.6* 1.0 1.0   ALKPHOSPHAT 69 50 54   GLOBULIN 3.1 3.0 3.1             No results for input(s): \"INR\", \"APTT\", \"FIBRINOGEN\" in the last 72 hours.    Invalid input(s): \"DIMER\"    MICROBIOLOGY:   Blood Culture   Date Value Ref Range Status   06/30/2018 No growth after 5 days of incubation.  Final        IMAGING:   EC-MACKENZIE W/O CONT         EC-ECHOCARDIOGRAM COMPLETE W/O CONT   Final Result      CT-LOWER EXTREMITY UNILATERAL WITH CONTRAST LEFT   Final Result         1. Subcutaneous edema. This may be seen with cellulitis.   2. No drainable abscess is evident.   3. Below the knee amputation without evidence for osteomyelitis.      DX-CHEST-PORTABLE (1 VIEW)   Final Result      1.  Right lower lobe atelectasis and/or pneumonitis..      IR-CVC TUNNEL W/O PORT REMOVAL    (Results Pending)       CULTURES:   Results       Procedure Component Value Units Date/Time    Blood Culture - Draw one from central line and one from peripheral site [[id number]]  (Abnormal)  (Susceptibility) Collected: 03/28/25 1632    Order Status: Completed Specimen: Blood from Line Updated: 04/01/25 0742     Significant Indicator " POS     Source BLD     Site Peripheral     Culture Result Growth detected by automated blood culture system. 03/29/2025  10:55        Streptococcus pyogenes (Group A)  This isolate does NOT demonstrate inducible Clindamycin  resistance in vitro.      Susceptibility       Streptococcus pyogenes (group a) (1)       Antibiotic Interpretation Microscan   Method Status    Clindamycin Sensitive - mm KB Final    Penicillin Sensitive 0.016 mcg/mL KB Final    Cefotaxime Sensitive 0.012 mcg/mL KB Final    Erythromycin Sensitive 0.125 mcg/mL KB Final                       Urine Culture (New) [[id number]] Collected: 03/30/25 0532    Order Status: Completed Specimen: Urine Updated: 04/01/25 0715     Significant Indicator NEG     Source UR     Site -     Culture Result No growth at 48 hours.    BLOOD CULTURE [[id number]] Collected: 03/31/25 1257    Order Status: Completed Specimen: Blood from Peripheral Updated: 03/31/25 1608     Significant Indicator NEG     Source BLD     Site PERIPHERAL     Culture Result No Growth  Note: Blood cultures are incubated for 5 days and  are monitored continuously.Positive blood cultures  are called to the RN and reported as soon as  they are identified.      BLOOD CULTURE [[id number]] Collected: 03/31/25 1255    Order Status: Completed Specimen: Blood from Peripheral Updated: 03/31/25 1608     Significant Indicator NEG     Source BLD     Site PERIPHERAL     Culture Result No Growth  Note: Blood cultures are incubated for 5 days and  are monitored continuously.Positive blood cultures  are called to the RN and reported as soon as  they are identified.      Culture Wound w/Gram Stain [[id number]]  (Abnormal) Collected: 03/29/25 1130    Order Status: Completed Specimen: Wound from Left Leg Updated: 03/31/25 1034     Significant Indicator POS     Source WND     Site LEFT LEG     Culture Result -     Gram Stain Result No organisms seen.     Culture Result Streptococcus pyogenes (Group A)  Moderate growth       E-Test [[id number]] Collected: 03/28/25 1632    Order Status: Completed Specimen: Other Updated: 03/31/25 0731     ETEST Sensitivity INTERIM    Narrative:      [facility] tel. [phone] 03/29/2025, 12:59, RB PERF. RESULTS CALLED TO: Frida HENAO  [id number] for Group A Strep  [facility] tel. [phone] 03/29/2025, 10:55, RB PERF. RESULTS CALLED TO: [id number]    Urinalysis [[id number]]  (Abnormal) Collected: 03/30/25 0532    Order Status: Completed Specimen: Urine Updated: 03/30/25 0635     Color Yellow     Character Clear     Specific Gravity 1.018     Ph 8.0     Glucose 250 mg/dL      Ketones Negative mg/dL      Protein >=1000 mg/dL      Bilirubin Negative     Urobilinogen, Urine 0.2 EU/dL      Nitrite Negative     Leukocyte Esterase Negative     Occult Blood Small     Micro Urine Req Microscopic    GRAM STAIN [[id number]] Collected: 03/29/25 1130    Order Status: Completed Specimen: Wound Updated: 03/29/25 1931     Significant Indicator .     Source WND     Site LEFT LEG     Gram Stain Result No organisms seen.    MRSA By PCR (Amp) [[id number]] Collected: 03/28/25 2041    Order Status: Completed Specimen: Respirate from Nares Updated: 03/29/25 0008     MRSA by PCR Negative    Blood Culture - Draw one from central line and one from peripheral site [[id number]] Collected: 03/28/25 1632    Order Status: Completed Specimen: Blood from Peripheral Updated: 03/28/25 1808     Significant Indicator NEG     Source BLD     Site PERIPHERAL     Culture Result No Growth  Note: Blood cultures are incubated for 5 days and  are monitored continuously.Positive blood cultures  are called to the RN and reported as soon as  they are identified.              MEDS:  Current Facility-Administered Medications   Medication Last Admin    [START ON 4/2/2025] amLODIPine (Norvasc) tablet 7.5 mg      lactated ringers infusion Rate Verify at 04/01/25 0920    ceFAZolin (Ancef) 2 g in  mL IVPB      oxyCODONE immediate-release (Roxicodone) tablet 2.5 mg      Or     oxyCODONE immediate-release (Roxicodone) tablet 5 mg 5 mg at 04/01/25 0209    Or    HYDROmorphone (Dilaudid) injection 0.25 mg 0.25 mg at 03/30/25 2255    epoetin (Retacrit) 5,000 Units injection (Dialysis Use Only) 5,000 Units at 03/31/25 1624    insulin GLARGINE (Lantus,Semglee) injection      aspirin EC tablet 81 mg 81 mg at 03/31/25 0550    NS (Bolus) 0.9 % infusion 100 mL      heparin intracatheter (for DIALYSIS USE ONLY) 1,800 Units 1,800 Units at 03/29/25 1317    heparin intracatheter (for DIALYSIS USE ONLY) 1,800 Units 1,800 Units at 03/29/25 1317    atorvastatin (Lipitor) tablet 80 mg 80 mg at 03/31/25 2007    cholestyramine (Questran) 4 GM powder 4 g 4 g at 03/31/25 1839    losartan (Cozaar) tablet 100 mg 100 mg at 04/01/25 0551    metOLazone (Zaroxolyn) tablet 5 mg 5 mg at 04/01/25 0551    torsemide (Demadex) tablet 100 mg 100 mg at 04/01/25 0551    omeprazole (PriLOSEC) capsule 20 mg 20 mg at 04/01/25 0551    [Held by provider] heparin injection 5,000 Units 5,000 Units at 03/29/25 0503    labetalol (Normodyne/Trandate) injection 10 mg 10 mg at 03/31/25 2114    acetaminophen (Tylenol) tablet 1,000 mg 1,000 mg at 03/29/25 0321    insulin lispro (HumaLOG,AdmeLOG) subcutaneous injection 1 Units at 03/31/25 2323    And    dextrose 50 % (D50W) injection 25 g      Pharmacy Consult Request ...Pain Management Review 1 Each      guaiFENesin dextromethorphan (Robitussin DM) 100-10 MG/5ML syrup 5 mL         ASSESSMENT/PLAN: 54 y.o. male admitted for L calf & diabetic foot ulcerative cellulitis     Endocarditis  Assessment & Plan  -Echo was positive for vegetations localized to the L coronary side aortic or mitral valve  -Recommended antibiotic treatment with Cefazolin  -Cardiology recommends MACKENZIE to establish baseline and reassess after antibiotic treatment  -Nephrology recommends patient is able to do MWF dialysis and fix tunneled dialysis port  -4/1: Results from MACKENZIE shows patient does not have any vegetations on  either aortic or mitral valve. Likely false positive result.    ESRD  Assessment & Plan  -Dialysis recommended MWF inpatient. Outpatient was MW. Will likely continue to need 3 times weekly dialysis.  -Continue to monitor for changes in BP, continue amlodipine.  -Patient to receive 4/2/25.    Cellulitis  Assessment & Plan  -Left lateral BKA stump with no erythema or tenderness to palpation likely improving due to antibiotic regimen  -Continue Lineazolid  -Monitor for changes      Core Measures:   Abx: Cefazolin  DVT prophylaxis: Heparin  Code Status: Full code    Disposition: Will stay  Rl Saleh, Student MS3

## 2025-04-02 ENCOUNTER — PHARMACY VISIT (OUTPATIENT)
Dept: PHARMACY | Facility: MEDICAL CENTER | Age: 55
End: 2025-04-02
Payer: COMMERCIAL

## 2025-04-02 VITALS
WEIGHT: 223.77 LBS | OXYGEN SATURATION: 95 % | SYSTOLIC BLOOD PRESSURE: 162 MMHG | HEART RATE: 70 BPM | DIASTOLIC BLOOD PRESSURE: 79 MMHG | HEIGHT: 72 IN | BODY MASS INDEX: 30.31 KG/M2 | TEMPERATURE: 97 F | RESPIRATION RATE: 16 BRPM

## 2025-04-02 LAB
ALBUMIN SERPL BCP-MCNC: 3.4 G/DL (ref 3.2–4.9)
ALBUMIN/GLOB SERPL: 1 G/DL
ALP SERPL-CCNC: 59 U/L (ref 30–99)
ALT SERPL-CCNC: <5 U/L (ref 2–50)
ANION GAP SERPL CALC-SCNC: 15 MMOL/L (ref 7–16)
AST SERPL-CCNC: 17 U/L (ref 12–45)
BACTERIA BLD CULT: NORMAL
BILIRUB SERPL-MCNC: 0.8 MG/DL (ref 0.1–1.5)
BUN SERPL-MCNC: 42 MG/DL (ref 8–22)
CALCIUM ALBUM COR SERPL-MCNC: 8.2 MG/DL (ref 8.5–10.5)
CALCIUM SERPL-MCNC: 7.7 MG/DL (ref 8.5–10.5)
CHLORIDE SERPL-SCNC: 96 MMOL/L (ref 96–112)
CO2 SERPL-SCNC: 23 MMOL/L (ref 20–33)
CREAT SERPL-MCNC: 7.2 MG/DL (ref 0.5–1.4)
ERYTHROCYTE [DISTWIDTH] IN BLOOD BY AUTOMATED COUNT: 44.3 FL (ref 35.9–50)
GFR SERPLBLD CREATININE-BSD FMLA CKD-EPI: 8 ML/MIN/1.73 M 2
GLOBULIN SER CALC-MCNC: 3.3 G/DL (ref 1.9–3.5)
GLUCOSE BLD STRIP.AUTO-MCNC: 109 MG/DL (ref 65–99)
GLUCOSE BLD STRIP.AUTO-MCNC: 87 MG/DL (ref 65–99)
GLUCOSE SERPL-MCNC: 127 MG/DL (ref 65–99)
HCT VFR BLD AUTO: 28.2 % (ref 42–52)
HGB BLD-MCNC: 9.2 G/DL (ref 14–18)
MCH RBC QN AUTO: 30.3 PG (ref 27–33)
MCHC RBC AUTO-ENTMCNC: 32.6 G/DL (ref 32.3–36.5)
MCV RBC AUTO: 92.8 FL (ref 81.4–97.8)
PLATELET # BLD AUTO: 158 K/UL (ref 164–446)
PMV BLD AUTO: 10.6 FL (ref 9–12.9)
POTASSIUM SERPL-SCNC: 4.4 MMOL/L (ref 3.6–5.5)
PROT SERPL-MCNC: 6.7 G/DL (ref 6–8.2)
RBC # BLD AUTO: 3.04 M/UL (ref 4.7–6.1)
SIGNIFICANT IND 70042: NORMAL
SITE SITE: NORMAL
SODIUM SERPL-SCNC: 134 MMOL/L (ref 135–145)
SOURCE SOURCE: NORMAL
WBC # BLD AUTO: 6.7 K/UL (ref 4.8–10.8)

## 2025-04-02 PROCEDURE — 87040 BLOOD CULTURE FOR BACTERIA: CPT

## 2025-04-02 PROCEDURE — 36415 COLL VENOUS BLD VENIPUNCTURE: CPT

## 2025-04-02 PROCEDURE — 99239 HOSP IP/OBS DSCHRG MGMT >30: CPT | Performed by: HOSPITALIST

## 2025-04-02 PROCEDURE — A9270 NON-COVERED ITEM OR SERVICE: HCPCS | Performed by: INTERNAL MEDICINE

## 2025-04-02 PROCEDURE — 85027 COMPLETE CBC AUTOMATED: CPT

## 2025-04-02 PROCEDURE — 700111 HCHG RX REV CODE 636 W/ 250 OVERRIDE (IP): Mod: JZ,TB | Performed by: INTERNAL MEDICINE

## 2025-04-02 PROCEDURE — A9270 NON-COVERED ITEM OR SERVICE: HCPCS

## 2025-04-02 PROCEDURE — 700111 HCHG RX REV CODE 636 W/ 250 OVERRIDE (IP)

## 2025-04-02 PROCEDURE — 700111 HCHG RX REV CODE 636 W/ 250 OVERRIDE (IP): Mod: JZ

## 2025-04-02 PROCEDURE — 80053 COMPREHEN METABOLIC PANEL: CPT

## 2025-04-02 PROCEDURE — 700102 HCHG RX REV CODE 250 W/ 637 OVERRIDE(OP): Performed by: INTERNAL MEDICINE

## 2025-04-02 PROCEDURE — 700102 HCHG RX REV CODE 250 W/ 637 OVERRIDE(OP)

## 2025-04-02 PROCEDURE — 82962 GLUCOSE BLOOD TEST: CPT | Mod: 91

## 2025-04-02 PROCEDURE — 90935 HEMODIALYSIS ONE EVALUATION: CPT | Performed by: INTERNAL MEDICINE

## 2025-04-02 PROCEDURE — 700105 HCHG RX REV CODE 258

## 2025-04-02 PROCEDURE — 90935 HEMODIALYSIS ONE EVALUATION: CPT

## 2025-04-02 RX ORDER — ATORVASTATIN CALCIUM 80 MG/1
40 TABLET, FILM COATED ORAL EVERY EVENING
Qty: 30 TABLET | Refills: 2 | Status: SHIPPED | OUTPATIENT
Start: 2025-04-02 | End: 2025-04-28 | Stop reason: SDUPTHER

## 2025-04-02 RX ORDER — ACETAMINOPHEN 500 MG
1000 TABLET ORAL EVERY 6 HOURS PRN
Qty: 30 TABLET | Refills: 0 | Status: SHIPPED | OUTPATIENT
Start: 2025-04-02

## 2025-04-02 RX ORDER — AMLODIPINE BESYLATE 2.5 MG/1
7.5 TABLET ORAL DAILY
Qty: 100 TABLET | Refills: 3 | Status: SHIPPED | OUTPATIENT
Start: 2025-04-03 | End: 2026-05-08

## 2025-04-02 RX ORDER — INSULIN GLARGINE 100 [IU]/ML
24 INJECTION, SOLUTION SUBCUTANEOUS EVERY EVENING
Qty: 9 ML | Refills: 0 | Status: ACTIVE | OUTPATIENT
Start: 2025-04-02

## 2025-04-02 RX ADMIN — CHOLESTYRAMINE 4 G: 4 POWDER, FOR SUSPENSION ORAL at 10:39

## 2025-04-02 RX ADMIN — SEVELAMER CARBONATE 1600 MG: 800 TABLET, FILM COATED ORAL at 09:02

## 2025-04-02 RX ADMIN — TORSEMIDE 100 MG: 100 TABLET ORAL at 05:46

## 2025-04-02 RX ADMIN — HYDROMORPHONE HYDROCHLORIDE 0.25 MG: 1 INJECTION, SOLUTION INTRAMUSCULAR; INTRAVENOUS; SUBCUTANEOUS at 02:21

## 2025-04-02 RX ADMIN — LOSARTAN POTASSIUM 100 MG: 50 TABLET, FILM COATED ORAL at 05:46

## 2025-04-02 RX ADMIN — EPOETIN ALFA-EPBX 5000 UNITS: 3000 INJECTION, SOLUTION INTRAVENOUS; SUBCUTANEOUS at 10:50

## 2025-04-02 RX ADMIN — SEVELAMER CARBONATE 1600 MG: 800 TABLET, FILM COATED ORAL at 14:50

## 2025-04-02 RX ADMIN — ASPIRIN 81 MG: 81 TABLET, COATED ORAL at 05:46

## 2025-04-02 RX ADMIN — AMLODIPINE BESYLATE 7.5 MG: 5 TABLET ORAL at 05:46

## 2025-04-02 RX ADMIN — OMEPRAZOLE 20 MG: 20 CAPSULE, DELAYED RELEASE ORAL at 05:46

## 2025-04-02 RX ADMIN — CEFAZOLIN 2 G: 2 INJECTION, POWDER, FOR SOLUTION INTRAMUSCULAR; INTRAVENOUS at 15:02

## 2025-04-02 RX ADMIN — METOLAZONE 5 MG: 5 TABLET ORAL at 05:46

## 2025-04-02 ASSESSMENT — COGNITIVE AND FUNCTIONAL STATUS - GENERAL
DAILY ACTIVITIY SCORE: 20
STANDING UP FROM CHAIR USING ARMS: A LITTLE
WALKING IN HOSPITAL ROOM: A LOT
DRESSING REGULAR UPPER BODY CLOTHING: A LITTLE
SUGGESTED CMS G CODE MODIFIER DAILY ACTIVITY: CJ
MOBILITY SCORE: 18
DRESSING REGULAR LOWER BODY CLOTHING: A LITTLE
SUGGESTED CMS G CODE MODIFIER MOBILITY: CK
HELP NEEDED FOR BATHING: A LITTLE
CLIMB 3 TO 5 STEPS WITH RAILING: TOTAL
TOILETING: A LITTLE

## 2025-04-02 ASSESSMENT — FIBROSIS 4 INDEX: FIB4 SCORE: 2.84

## 2025-04-02 ASSESSMENT — PAIN DESCRIPTION - PAIN TYPE: TYPE: ACUTE PAIN

## 2025-04-02 ASSESSMENT — ENCOUNTER SYMPTOMS
CHILLS: 0
SHORTNESS OF BREATH: 0
VOMITING: 0
FEVER: 0
DIARRHEA: 0
NAUSEA: 0
ABDOMINAL PAIN: 0

## 2025-04-02 NOTE — PROGRESS NOTES
FINDINGS:  Following a thorough discussion of the risks and benefits of the procedure, informed verbal consent was obtained.     The patient was positioned supine on the gurney and the existing perm catheter skin exit site and catheter were prepped and draped in the usual sterile fashion.  10 mL 1% lidocaine utilized for local anesthesia.     Using blunt dissection and manual traction, the dialysis catheter was removed and pressure held on the tunnel tract until hemostasis was achieved. An occlusive dressing was placed over the skin exit site. Wound education provided to patient      The patient tolerated the procedure well.     IMPRESSION:     Removal of the tunneled hemodialysis catheter

## 2025-04-02 NOTE — NON-PROVIDER
[facility] INTERNAL MEDICINE PROGRESS NOTE - STUDENT NOTE     Attending:   Dr. Pelon Kirkland MD     Resident:   Dr. Preeti Mark MD     PATIENT:   Beni Moore; [id number]; 1970     CC: Bacteremia due to cellulitis of the L calf     HOSPITAL COURSE: Beni Moore is a 54 year old male admitted for L calf and knee cellulitis, growing group A bacteremia, on admission day 4. He presented with an ulcer from a poor-fitting prosthesis that began to gradually worsen with pain and swelling with purulence.      PMH includes IDDM w/ enteropathy-related diarrhea, ESRD on HD, HTN, dyslipidemia, s/p BKA, s/p R transmetatarsal amputation, CAD s/p stent placement.      INTERVAL UPDATES  No acute events overnight. Feels well. PT 15.2, INR 1.20. MACKENZIE showed no endocarditis.     Review of Systems   Constitutional:  Negative for chills and fever.   Respiratory:  Negative for shortness of breath.    Cardiovascular:  Negative for chest pain.   Gastrointestinal:  Negative for abdominal pain, diarrhea, nausea and vomiting.        OBJECTIVE:  Vitals:    04/02/25 0824 04/02/25 0925 04/02/25 1224 04/02/25 1400   BP: (!) 177/96  (!) 162/79    Pulse: 76 80 73 70   Resp: 18 16 18 16   Temp: 37 °C (98.6 °F)  36.1 °C (97 °F)    TempSrc: Temporal  Temporal    SpO2:  96%  95%   Weight:       Height:           Intake/Output Summary (Last 24 hours) at 4/2/2025 1422  Last data filed at 4/2/2025 1400  Gross per 24 hour   Intake 900 ml   Output 4800 ml   Net -3900 ml       Physical Exam  Constitutional:       General: He is not in acute distress.  HENT:      Head: Atraumatic.      Mouth/Throat:      Mouth: Mucous membranes are moist.   Cardiovascular:      Rate and Rhythm: Normal rate and regular rhythm.   Pulmonary:      Effort: Pulmonary effort is normal.      Breath sounds: Normal breath sounds.   Abdominal:      General: Bowel sounds are normal.      Palpations: Abdomen is soft.   Skin:     General: Skin is warm and dry.   Neurological:       "General: No focal deficit present.      Cranial Nerves: No cranial nerve deficit.            LABS:  Recent Labs     03/31/25 0156 04/01/25 0410 04/02/25  0114   WBC 6.8 5.2 6.7   RBC 2.68* 2.80* 3.04*   HEMOGLOBIN 8.1* 8.4* 9.2*   HEMATOCRIT 25.4* 25.7* 28.2*   MCV 94.8 91.8 92.8   MCH 30.2 30.0 30.3   RDW 47.5 44.3 44.3   PLATELETCT 128* 127* 158*   MPV 10.6 10.4 10.6     Recent Labs     03/31/25 0156 04/01/25 0410 04/02/25  0114   SODIUM 131* 133* 134*   POTASSIUM 4.2 4.2 4.4   CHLORIDE 96 97 96   CO2 22 23 23   BUN 50* 34* 42*   CREATININE 7.23* 5.91* 7.20*   CALCIUM 7.7* 7.7* 7.7*   PHOSPHORUS  --  3.7  --    ALBUMIN 3.0* 3.1* 3.4     Estimated GFR/CRCL = Estimated Creatinine Clearance: 14.2 mL/min (A) (by C-G formula based on SCr of 7.2 mg/dL (HH)).  Recent Labs     03/31/25 0156 04/01/25 0410 04/02/25  0114   GLUCOSE 160* 154* 127*     Recent Labs     03/31/25 0156 04/01/25 0410 04/02/25  0114   ASTSGOT 15 17 17   ALTSGPT <5 <5 <5   TBILIRUBIN 1.0 1.0 0.8   ALKPHOSPHAT 50 54 59   GLOBULIN 3.0 3.1 3.3             No results for input(s): \"INR\", \"APTT\", \"FIBRINOGEN\" in the last 72 hours.    Invalid input(s): \"DIMER\"    MICROBIOLOGY:   Blood Culture   Date Value Ref Range Status   06/30/2018 No growth after 5 days of incubation.  Final        IMAGING:   IR-CVC TUNNEL W/O PORT REMOVAL   Final Result      Successful removal of tunneled central venous catheter as described.      EC-MACKENZIE W/O CONT   Final Result      EC-ECHOCARDIOGRAM COMPLETE W/O CONT   Final Result      CT-LOWER EXTREMITY UNILATERAL WITH CONTRAST LEFT   Final Result         1. Subcutaneous edema. This may be seen with cellulitis.   2. No drainable abscess is evident.   3. Below the knee amputation without evidence for osteomyelitis.      DX-CHEST-PORTABLE (1 VIEW)   Final Result      1.  Right lower lobe atelectasis and/or pneumonitis..          CULTURES:   Results       Procedure Component Value Units Date/Time    BLOOD CULTURE [[id number]] " Collected: 04/02/25 0114    Order Status: Completed Specimen: Blood from Peripheral Updated: 04/02/25 1208     Significant Indicator NEG     Source BLD     Site PERIPHERAL     Culture Result No Growth  Note: Blood cultures are incubated for 5 days and  are monitored continuously.Positive blood cultures  are called to the RN and reported as soon as  they are identified.      BLOOD CULTURE [[id number]] Collected: 04/02/25 0114    Order Status: Completed Specimen: Blood from Peripheral Updated: 04/02/25 1208     Significant Indicator NEG     Source BLD     Site PERIPHERAL     Culture Result No Growth  Note: Blood cultures are incubated for 5 days and  are monitored continuously.Positive blood cultures  are called to the RN and reported as soon as  they are identified.      Blood Culture - Draw one from central line and one from peripheral site [[id number]]  (Abnormal)  (Susceptibility) Collected: 03/28/25 1632    Order Status: Completed Specimen: Blood from Line Updated: 04/01/25 0742     Significant Indicator POS     Source BLD     Site Peripheral     Culture Result Growth detected by automated blood culture system. 03/29/2025  10:55        Streptococcus pyogenes (Group A)  This isolate does NOT demonstrate inducible Clindamycin  resistance in vitro.      Susceptibility       Streptococcus pyogenes (group a) (1)       Antibiotic Interpretation Microscan   Method Status    Clindamycin Sensitive - mm KB Final    Penicillin Sensitive 0.016 mcg/mL KB Final    Cefotaxime Sensitive 0.012 mcg/mL KB Final    Erythromycin Sensitive 0.125 mcg/mL KB Final                       Urine Culture (New) [[id number]] Collected: 03/30/25 0532    Order Status: Completed Specimen: Urine Updated: 04/01/25 0715     Significant Indicator NEG     Source UR     Site -     Culture Result No growth at 48 hours.    BLOOD CULTURE [[id number]] Collected: 03/31/25 1257    Order Status: Completed Specimen: Blood from Peripheral Updated: 03/31/25 1608      Significant Indicator NEG     Source BLD     Site PERIPHERAL     Culture Result No Growth  Note: Blood cultures are incubated for 5 days and  are monitored continuously.Positive blood cultures  are called to the RN and reported as soon as  they are identified.      BLOOD CULTURE [[id number]] Collected: 03/31/25 1255    Order Status: Completed Specimen: Blood from Peripheral Updated: 03/31/25 1608     Significant Indicator NEG     Source BLD     Site PERIPHERAL     Culture Result No Growth  Note: Blood cultures are incubated for 5 days and  are monitored continuously.Positive blood cultures  are called to the RN and reported as soon as  they are identified.      Culture Wound w/Gram Stain [[id number]]  (Abnormal) Collected: 03/29/25 1130    Order Status: Completed Specimen: Wound from Left Leg Updated: 03/31/25 1034     Significant Indicator POS     Source WND     Site LEFT LEG     Culture Result -     Gram Stain Result No organisms seen.     Culture Result Streptococcus pyogenes (Group A)  Moderate growth      E-Test [[id number]] Collected: 03/28/25 1632    Order Status: Completed Specimen: Other Updated: 03/31/25 0731     ETEST Sensitivity INTERIM    Narrative:      [facility] tel. [phone] 03/29/2025, 12:59, RB PERF. RESULTS CALLED TO: Frida HENAO  [id number] for Group A Strep  [facility] tel. [phone] 03/29/2025, 10:55, RB PERF. RESULTS CALLED TO: [id number]    Urinalysis [[id number]]  (Abnormal) Collected: 03/30/25 0532    Order Status: Completed Specimen: Urine Updated: 03/30/25 0635     Color Yellow     Character Clear     Specific Gravity 1.018     Ph 8.0     Glucose 250 mg/dL      Ketones Negative mg/dL      Protein >=1000 mg/dL      Bilirubin Negative     Urobilinogen, Urine 0.2 EU/dL      Nitrite Negative     Leukocyte Esterase Negative     Occult Blood Small     Micro Urine Req Microscopic    GRAM STAIN [[id number]] Collected: 03/29/25 1130    Order Status: Completed Specimen: Wound Updated: 03/29/25 1931     Significant Indicator .      Source WND     Site LEFT LEG     Gram Stain Result No organisms seen.    MRSA By PCR (Amp) [[id number]] Collected: 03/28/25 2041    Order Status: Completed Specimen: Respirate from Nares Updated: 03/29/25 0008     MRSA by PCR Negative    Blood Culture - Draw one from central line and one from peripheral site [[id number]] Collected: 03/28/25 1632    Order Status: Completed Specimen: Blood from Peripheral Updated: 03/28/25 1808     Significant Indicator NEG     Source BLD     Site PERIPHERAL     Culture Result No Growth  Note: Blood cultures are incubated for 5 days and  are monitored continuously.Positive blood cultures  are called to the RN and reported as soon as  they are identified.              MEDS:  Current Facility-Administered Medications   Medication Last Admin    amLODIPine (Norvasc) tablet 7.5 mg 7.5 mg at 04/02/25 0546    lactated ringers infusion Rate Verify at 04/01/25 0920    ceFAZolin (Ancef) 2 g in  mL IVPB Stopped at 04/01/25 1824    sevelamer carbonate (Renvela) tablet 1,600 mg 1,600 mg at 04/02/25 0902    oxyCODONE immediate-release (Roxicodone) tablet 2.5 mg      Or    oxyCODONE immediate-release (Roxicodone) tablet 5 mg 5 mg at 04/01/25 2211    Or    HYDROmorphone (Dilaudid) injection 0.25 mg 0.25 mg at 04/02/25 0221    epoetin (Retacrit) 5,000 Units injection (Dialysis Use Only) 5,000 Units at 04/02/25 1050    insulin GLARGINE (Lantus,Semglee) injection 18 Units at 04/01/25 2051    aspirin EC tablet 81 mg 81 mg at 04/02/25 0546    NS (Bolus) 0.9 % infusion 100 mL      heparin intracatheter (for DIALYSIS USE ONLY) 1,800 Units 1,800 Units at 03/29/25 1317    heparin intracatheter (for DIALYSIS USE ONLY) 1,800 Units 1,800 Units at 03/29/25 1317    atorvastatin (Lipitor) tablet 80 mg 80 mg at 04/01/25 2053    cholestyramine (Questran) 4 GM powder 4 g 4 g at 04/02/25 1039    losartan (Cozaar) tablet 100 mg 100 mg at 04/02/25 0546    metOLazone (Zaroxolyn) tablet 5 mg 5 mg at 04/02/25 0587     torsemide (Demadex) tablet 100 mg 100 mg at 04/02/25 0546    omeprazole (PriLOSEC) capsule 20 mg 20 mg at 04/02/25 0546    [Held by provider] heparin injection 5,000 Units 5,000 Units at 03/29/25 0503    labetalol (Normodyne/Trandate) injection 10 mg 10 mg at 03/31/25 2114    acetaminophen (Tylenol) tablet 1,000 mg 1,000 mg at 03/29/25 0321    insulin lispro (HumaLOG,AdmeLOG) subcutaneous injection 1 Units at 04/01/25 2052    And    dextrose 50 % (D50W) injection 25 g      Pharmacy Consult Request ...Pain Management Review 1 Each      guaiFENesin dextromethorphan (Robitussin DM) 100-10 MG/5ML syrup 5 mL         ASSESSMENT/PLAN: 54 y.o. male admitted for bacteremia 2/2 cellulitis of L calf and knee diabetic ulcer    Baceteremia  Assessment & Plan  -Echo was positive for vegetations localized to the L coronary side aortic or mitral valve  -Recommended antibiotic treatment with Cefazolin  -Cardiology recommends MACKENZIE to establish baseline and reassess after antibiotic treatment  -Nephrology recommends patient is able to do MWF dialysis and fix tunneled dialysis port  -4/1: Results from MACKENZIE shows patient does not have any vegetations on either aortic or mitral valve. Likely false positive result.  -4/2: Patient to receive cefazolin for 10 days.  -D/C after dialysis and shot of cefazolin    ESRD  Assessment & Plan  -Dialysis recommended MWF inpatient. Outpatient was MW. Will likely continue to need 3 times weekly dialysis.  -Continue to monitor for changes in BP, continue amlodipine.  -Patient received dialysis today.     Cellulitis  Assessment & Plan  -Left lateral BKA stump with no erythema or tenderness to palpation likely improving due to antibiotic regimen  -Shot of Cefazolin after dialysis today    Core Measures:   Abx: Ampicillin, Cefazolin  DVT prophylaxis: Heparin - held  Code Status: Full code    Disposition: D/c home  Rl Saleh, Student MS3

## 2025-04-02 NOTE — WOUND TEAM
Wound team to follow up on left leg wound care. Patient stated he had dressings changed this morning by bedside nursing and may be discharging today. Patient requesting additional supplies, extra hydrofera blue ordered.

## 2025-04-02 NOTE — CARE PLAN
The patient is Stable - Low risk of patient condition declining or worsening    Shift Goals  Clinical Goals: IV abx, pain control,safety  Patient Goals: pain control, rest  Family Goals: JENNIFER    Progress made toward(s) clinical / shift goals:    Problem: Pain - Standard  Goal: Alleviation of pain or a reduction in pain to the patient’s comfort goal  Description: Target End Date:  Prior to discharge or change in level of careDocument on Vitals flowsheet1.  Document pain using the appropriate pain scale per order or unit policy2.  Educate and implement non-pharmacologic comfort measures (i.e. relaxation, distraction, massage, cold/heat therapy, etc.)3.  Pain management medications as ordered4.  Reassess pain after pain med administration per policy5.  If opiods administered assess patient's response to pain medication is appropriate per POSS sedation scale6.  Follow pain management plan developed in collaboration with patient and interdisciplinary team (including palliative care or pain specialists if applicable)  Outcome: Progressing     Problem: Knowledge Deficit - Standard  Goal: Patient and family/care givers will demonstrate understanding of plan of care, disease process/condition, diagnostic tests and medications  Description: Target End Date:  1-3 days or as soon as patient condition allowsDocument in Patient Education1.  Patient and family/caregiver oriented to unit, equipment, visitation policy and means for communicating concern2.  Complete/review Learning Assessment3.  Assess knowledge level of disease process/condition, treatment plan, diagnostic tests and medications4.  Explain disease process/condition, treatment plan, diagnostic tests and medications  Outcome: Progressing     Problem: Fall Risk  Goal: Patient will remain free from falls  Description: Target End Date:  Prior to discharge or change in level of careDocument interventions on the Florecita Roldan Fall Risk Assessment1.  Assess for fall risk  factors2.  Implement fall precautions  Outcome: Progressing       Patient is not progressing towards the following goals:

## 2025-04-02 NOTE — CARE PLAN
The patient is Stable - Low risk of patient condition declining or worsening    Shift Goals  Clinical Goals: HD; abx; wound care  Patient Goals: rest; update  Family Goals: mima    Progress made toward(s) clinical / shift goals:        Problem: Pain - Standard  Goal: Alleviation of pain or a reduction in pain to the patient’s comfort goal  Description: Target End Date:  Prior to discharge or change in level of careDocument on Vitals flowsheet1.  Document pain using the appropriate pain scale per order or unit policy2.  Educate and implement non-pharmacologic comfort measures (i.e. relaxation, distraction, massage, cold/heat therapy, etc.)3.  Pain management medications as ordered4.  Reassess pain after pain med administration per policy5.  If opiods administered assess patient's response to pain medication is appropriate per POSS sedation scale6.  Follow pain management plan developed in collaboration with patient and interdisciplinary team (including palliative care or pain specialists if applicable)  4/2/2025 1508 by Gurinder Genao R.N.  Outcome: Met  4/2/2025 1012 by Gurinder Genao R.N.  Outcome: Progressing     Problem: Knowledge Deficit - Standard  Goal: Patient and family/care givers will demonstrate understanding of plan of care, disease process/condition, diagnostic tests and medications  Description: Target End Date:  1-3 days or as soon as patient condition allowsDocument in Patient Education1.  Patient and family/caregiver oriented to unit, equipment, visitation policy and means for communicating concern2.  Complete/review Learning Assessment3.  Assess knowledge level of disease process/condition, treatment plan, diagnostic tests and medications4.  Explain disease process/condition, treatment plan, diagnostic tests and medications  4/2/2025 1508 by Gurinder Genao R.N.  Outcome: Met  4/2/2025 1012 by Gurinder Genao R.N.  Outcome: Progressing     Problem: Hemodynamics  Goal: Patient's hemodynamics,  fluid balance and neurologic status will be stable or improve  Description: Target End Date:  Prior to discharge or change in level of careDocument on Assessment and I/O flowsheet templates1.  Monitor vital signs, pulse oximetry and cardiac monitor per provider order and/or policy2.  Maintain blood pressure per provider order3.  Hemodynamic monitoring per provider order4.  Manage IV fluids and IV infusions5.  Monitor intake and output6.  Daily weights per unit policy or provider order7.  Assess peripheral pulses and capillary refill8.  Assess color and body temperature9.  Position patient for maximum circulation/cardiac [id number]. Monitor for signs/symptoms of excessive plrtjeqr35. Assess mental status, restlessness and changes in level of [id number]. Monitor temperature and report fever or hypothermia to provider immediately. Consideration of targeted temperature management.  Outcome: Met     Problem: Fluid Volume  Goal: Fluid volume balance will be maintained  Description: Target End Date:  Prior to discharge or change in level of careDocument on I/O flowsheet1.  Monitor intake and output as ordered2.  Promote oral intake as appropriate3.  Report inadequate intake or output to physician4.  Administer IV therapy as ordered5.  Weights per provider order6.  Assess for signs and symptoms of bleeding7.  Monitor for signs of fluid overload (respiratory changes, edema, weight gain, increased abdominal girth)8.  Monitor of signs for inadequate fluid volume (poor skin turgor, dry mucous membranes)9.  Instruct patient on adherence to fluid restrictions  Outcome: Met     Problem: Urinary - Renal Perfusion  Goal: Ability to achieve and maintain adequate renal perfusion and functioning will improve  Description: Target End Date:  Prior to discharge or change in level of careDocument on I/O and Assessment flowsheet1.  Urine output will remain greater than 0.5ml/Kg/HR2.  Monitor amount and/or characteristics of urine per  order/policy. Specific gravity per order/policy3.  Assess signs and symptoms of renal dysfunction  Outcome: Met     Problem: Respiratory  Goal: Patient will achieve/maintain optimum respiratory ventilation and gas exchange  Description: Target End Date:  Prior to discharge or change in level of careDocument on Assessment flowsheet1.  Assess and monitor rate, rhythm, depth and effort of respiration2.  Breath sounds assessed qshift and/or as needed3.  Assess O2 saturation, administer/titrate oxygen as ordered4.  Position patient for maximum ventilatory efficiency5.  Turn, cough, and deep breath with splinting to improve effectiveness6.  Collaborate with RT to administer medication/treatments per order7.  Encourage use of incentive spirometer and encourage patient to cough after use and utilize splinting techniques if applicable8.  Airway suctioning9.  Monitor sputum production for changes in color, consistency and cpjifyrul94. Perform frequent oral trhowap24. Alternate physical activity with rest periods  Outcome: Met     Problem: Mechanical Ventilation  Goal: Safe management of artificial airway and ventilation  Description: Target End Date:  when vent discontinuedDocument on VAP flowsheet and Airway LDA1.  Daily awakening trials per provider order/policy2.  Suctioning and care of ET/Trach tube (document on LDA)3.  Collaborate with RT to administer medications/treatments4.  Ambu bag at bedside and available for transport5.  Trach patient - replacement trach at bedside6.  Provide communication tools if applicable  Outcome: Met  Goal: Successful weaning off mechanical ventilator, spontaneously maintains adequate gas exchange  Description: Target End Date:  when vent discontinued1.  Follow universal weaning protocol for patients on mechanical ventilation per order2.  Review contraindication list.  Obtain provider order to wean in presence of contraindication.3.  Obtain Black Sedation-Agitation Score4.  Black Score 1-2:   sedation vacation5.  Black Score 3-4:  Collaborate with provider and/or RT to determine readiness for trial6.  Begin 2 hour trial of weaning following protocol7.  Evaluate for fatigue parameters per protocol8.  Fatigue parameters triggered:  Stop wean and return to previous ASV% setting or increase % minute volume to offset work or breathing  Outcome: Met  Goal: Patient will be able to express needs and understand communication  Description: Target End Date:  when vent discontinued1.  Assess ability to communicate and understand2.  Provide communication tools3.  Collaborate with Speech Therapy for [facility]  Outcome: Met     Problem: Physical Regulation  Goal: Diagnostic test results will improve  Description: Target End Date:  Prior to discharge or change in level of care1.  Monitor lactic acid levels2.  Monitor ABG's3.  Monitor diagnostic test results  Outcome: Met  Goal: Signs and symptoms of infection will decrease  Description: Target End Date:  Prior to discharge or change in level of care1.  Remove potential routes of infection, such as central lines and urinary catheter2.  Follow facility protocol for changing IV tubing and sites3.  Collaborate with Infectious Disease4.  Antibiotic therapy per provider order5.  Note drug effects and monitor for antibiotic toxicity  Outcome: Met     Problem: Fall Risk  Goal: Patient will remain free from falls  Description: Target End Date:  Prior to discharge or change in level of careDocument interventions on the Bernabe Jamar Fall Risk Assessment1.  Assess for fall risk factors2.  Implement fall precautions  Outcome: Met       Patient is not progressing towards the following goals:

## 2025-04-02 NOTE — DISCHARGE PLANNING
@1520 Received voalte message that patient  going home and is getting Cefazolin on Friday with the HD. Do I need to order the cefazolin or will they take care ?    Informed him that nephrology should be informed so nephrologist can call in the order to HD clinic for patient.

## 2025-04-02 NOTE — CARE PLAN
The patient is Stable - Low risk of patient condition declining or worsening    Shift Goals  Clinical Goals: HD; abx; wound care  Patient Goals: rest; update  Family Goals: mima    Progress made toward(s) clinical / shift goals:        Problem: Pain - Standard  Goal: Alleviation of pain or a reduction in pain to the patient’s comfort goal  Description: Target End Date:  Prior to discharge or change in level of careDocument on Vitals flowsheet1.  Document pain using the appropriate pain scale per order or unit policy2.  Educate and implement non-pharmacologic comfort measures (i.e. relaxation, distraction, massage, cold/heat therapy, etc.)3.  Pain management medications as ordered4.  Reassess pain after pain med administration per policy5.  If opiods administered assess patient's response to pain medication is appropriate per POSS sedation scale6.  Follow pain management plan developed in collaboration with patient and interdisciplinary team (including palliative care or pain specialists if applicable)  Outcome: Progressing     Problem: Knowledge Deficit - Standard  Goal: Patient and family/care givers will demonstrate understanding of plan of care, disease process/condition, diagnostic tests and medications  Description: Target End Date:  1-3 days or as soon as patient condition allowsDocument in Patient Education1.  Patient and family/caregiver oriented to unit, equipment, visitation policy and means for communicating concern2.  Complete/review Learning Assessment3.  Assess knowledge level of disease process/condition, treatment plan, diagnostic tests and medications4.  Explain disease process/condition, treatment plan, diagnostic tests and medications  Outcome: Progressing       Patient is not progressing towards the following goals:

## 2025-04-02 NOTE — PROCEDURES
Date of service: 04/02/25    Diagnosis: End-Stage Renal Disease admitted for cellulitis, found to have group A strep bacteremia, with no evidence of endocarditis on MACKENZIE. Patient seen and examined on hemodialysis during treatment. Patient is stable, tolerating hemodialysis. Denies chest pain and shortness of breath. Orders updated as needed. Please refer to flowsheet for full details.    Access: Left arm AV fistula   UF goal: 3 to 4 L as tolerated    Plan: Continue dialysis on a Monday Wednesday Friday schedule while patient requires antibiotics.  Cefazolin ordered with dialysis through April 9, 2025, with last dose on April 9, 2025.    OK to discharge from Nephrology standpoint.   There is no need for outpatient nephrology clinic follow up appointment, as the patient will be seen by a nephrologist at their outpatient dialysis center.     Varun Ortiz MD  Nephrology   [facility]

## 2025-04-02 NOTE — DISCHARGE SUMMARY
[facility] Internal Medicine Discharge Summary    Attending: RIKY Kirkland M.d.  Senior Resident: Dr. Juan Carlos Greenberg  Intern:  Dr. Preeti Mark  Contact Number: [phone]    CHIEF COMPLAINT ON ADMISSION  Chief Complaint   Patient presents with    Wound Check     Pt w L BKA. Pt has a mis fitting prosthesis which has rubbed a sore.     Fever       Reason for Admission  ems     Admission Date  3/28/2025    CODE STATUS  Full Code    HPI & HOSPITAL COURSE    Beni Moore is a 54 y.o. male admitted 03/28/2025 for left calf diabetic foot with cellulitis. He has a PMHx of IDDM w/diabetic enteropathy-related diarrhea, ESRD on ED, Hypertension, Dyslipidemia, s/p left BKA, s/p right transmetatarsal amputation, CAD s/p stent.   He was found to have a left lower extremity cellulitis, and have group a streptococcal bacteremia.  Patient underwent a TTE which revealed aortic vegetations, however a MACKENZIE confirmed no vegetations. Patient seen by nephrology inpatient who switched the patient to a M/W/F dialysis and IV cefazolin to continue until 4/9 along with dialysis (confirmed with Dr. Ortiz). Atorvastatin was cut to 40mg given recent lipids were wnl, ordered a lipid panel for PCP to follow up. Prosthetics o/p consult placed for better fitting prosthetics.    Therefore, he is discharged in good and stable condition to home with close outpatient follow-up.    The patient met 2-midnight criteria for an inpatient stay at the time of discharge.    Discharge Date  4/2/2025    Physical Exam on Day of Discharge  Physical Exam  Constitutional:       Appearance: Normal appearance.   HENT:      Head: Normocephalic.      Mouth/Throat:      Mouth: Mucous membranes are moist.   Eyes:      Extraocular Movements: Extraocular movements intact.      Pupils: Pupils are equal, round, and reactive to light.   Cardiovascular:      Rate and Rhythm: Normal rate and regular rhythm.   Pulmonary:      Effort: Pulmonary effort is normal.      Breath  sounds: Normal breath sounds.   Abdominal:      General: Abdomen is flat.      Palpations: Abdomen is soft.   Musculoskeletal:         General: Swelling present.      Right lower leg: Edema present.      Left lower leg: Edema present.      Comments: BKA on left, pitting oedema B/L.  Left leg has a 1*1cm open wound, dressing intact, not soaked   Skin:     General: Skin is warm.   Neurological:      General: No focal deficit present.      Mental Status: He is alert.         FOLLOW UP ITEMS POST DISCHARGE  Nephrology for HD and IV antibiotics    DISCHARGE DIAGNOSES  Principal Problem:    Streptococcal bacteremia (POA: Yes)  Active Problems:    Anemia (POA: Yes)    Cellulitis of left thigh (POA: Yes)    S/P transmetatarsal amputation of foot, right (HCC) (POA: Yes)    Hypertension (Chronic) (POA: Yes)    Coronary artery disease due to lipid rich plaque - post PCI to RCA in 2019 (Chronic) (POA: Yes)    Type 2 diabetes mellitus (HCC) (POA: Yes)    Diabetic enteropathy (HCC) (POA: Yes)    Sepsis (HCC) (POA: Yes)    ESRD (end stage renal disease) on dialysis (HCC) (POA: Yes)    Moderate mitral regurgitation (Chronic) (POA: Yes)    Paroxysmal SVT (supraventricular tachycardia) (HCC) -on telemetry and during MACKENZIE atrial tachycardia likely (Chronic) (POA: Clinically Undetermined)  Resolved Problems:    * No resolved hospital problems. *      FOLLOW UP  No future appointments.  Rl Garcia M.D.  [address]  [address]  [facility] [address]  [phone]    Follow up in 12 month(s)    Lipid panel to be followed up by PCP.    MEDICATIONS ON DISCHARGE     Medication List        PAUSE taking these medications        Instructions   Mircera 75 MCG/0.3ML Sosy  Wait to take this until your doctor or other care provider tells you to start again.  Generic drug: Methoxy PEG-Epoetin Beta   Inject 75 mcg as directed every 14 days. Elizabeth Mendez [phone]  Dose: 75 mcg            START taking these medications         Instructions   acetaminophen 500 MG Tabs  Commonly known as: Tylenol   Take 2 Tablets by mouth every 6 hours as needed for Moderate Pain.  Dose: 1,000 mg     amLODIPine 2.5 MG Tabs  Start taking on: April 3, 2025  Commonly known as: Norvasc   Take 3 Tablets by mouth every day.  Dose: 7.5 mg     Lantus SoloStar 100 UNIT/ML Sopn injection  Generic drug: insulin glargine  Replaces: Tresiba 100 UNIT/ML Soln   Doctor's comments: Please check your glucose levels prior to injection. Do not inject if blood glucose is less than 100mg/dL or if fasting.  Inject 24 Units under the skin every evening.  Dose: 24 Units            CHANGE how you take these medications        Instructions   atorvastatin 80 MG tablet  What changed: how much to take  Commonly known as: Lipitor   Take 0.5 Tablets by mouth every evening.  Dose: 40 mg            CONTINUE taking these medications        Instructions   aspirin 81 MG EC tablet   Take 1 Tablet by mouth every day.  Dose: 81 mg     cholestyramine 4 g packet  Commonly known as: Questran   Take 4 g by mouth 2 times a day for 360 days.  Dose: 1 Packet     losartan 100 MG Tabs  Commonly known as: Cozaar   Take 1 Tablet by mouth every day.  Dose: 100 mg     metOLazone 5 MG Tabs  Commonly known as: Zaroxolyn   Take 1 Tablet by mouth every day.  Dose: 5 mg     omeprazole 20 MG delayed-release capsule  Commonly known as: PriLOSEC   Take 20 mg by mouth every morning.   Dose: 20 mg     torsemide 100 MG Tabs  Commonly known as: Demadex   Take 1 Tablet by mouth every day.  Dose: 100 mg            STOP taking these medications      calcium acetate 667 MG Caps  Commonly known as: Phos-Lo     HumuLIN R 100 UNIT/ML Soln  Generic drug: insulin regular     Tresiba 100 UNIT/ML Soln  Generic drug: Insulin Degludec  Replaced by: Lantus SoloStar 100 UNIT/ML Sopn injection     Tylenol PM Extra Strength 500-25 MG Tabs  Generic drug: diphenhydrAMINE-APAP (sleep)              Allergies  No Known  Allergies    DIET  Orders Placed This Encounter   Procedures    Diet Order Diet: Consistent CHO (Diabetic)     Standing Status:   Standing     Number of Occurrences:   1     Diet::   Consistent CHO (Diabetic) [4]       ACTIVITY  As tolerated.  Weight bearing as tolerated    CONSULTATIONS  Cardiology  Infectious diseases  Nephrology    PROCEDURES  MACKENZIE on 4/1, HD on M/W/F    LABORATORY  Lab Results   Component Value Date    SODIUM 134 (L) 04/02/2025    POTASSIUM 4.4 04/02/2025    CHLORIDE 96 04/02/2025    CO2 23 04/02/2025    GLUCOSE 127 (H) 04/02/2025    BUN 42 (H) 04/02/2025    CREATININE 7.20 (HH) 04/02/2025        Lab Results   Component Value Date    WBC 6.7 04/02/2025    HEMOGLOBIN 9.2 (L) 04/02/2025    HEMATOCRIT 28.2 (L) 04/02/2025    PLATELETCT 158 (L) 04/02/2025        Total time of the discharge process exceeds 45 minutes.

## 2025-04-02 NOTE — PROGRESS NOTES
[facility] Progress Note     Hemodialysis treatment ordered today per Dr. Varun Ortiz x 3.5 hours. A and 0 x 4; on tele monitor; denies any pain  Treatment initiated at 0936 completed at 1306.      Patient tolerated tx; see electronic flow sheet for details.      Net UF 4000 mL.      Needles removed from access site. Dressings applied and sites held x 10 minutes; verified no bleeding. Positive bruit/thrill post tx.   Staff RN to monitor AVF for breakthrough bleeding. Should breakthrough bleeding occur, staff RN to apply pressure to access sites until bleeding resolved.   Notify Dialysis and Nephrologist for follow-up.     Report given to Primary RN Gurinder Genao .

## 2025-04-03 ENCOUNTER — PATIENT OUTREACH (OUTPATIENT)
Dept: MEDICAL GROUP | Facility: PHYSICIAN GROUP | Age: 55
End: 2025-04-03
Payer: MEDICARE

## 2025-04-03 NOTE — PROGRESS NOTES
Transitional Care Management  TCM Outreach Date and Time: Filed (4/3/2025  9:49 AM)    Discharge Questions  Actual Discharge Date: 04/02/25  Now that you are home, how are you feeling?: Good  Did you receive any new prescriptions?: Yes  Were you able to get them filled?: Yes  Meds to Bed or Pharmacy filled?: Meds to Bed  Do you have any questions about your current medications or new medications (Review Med Rec)?: No  Did you have any durable medical equipment ordered?: No  Do you have a follow up appointment scheduled with your PCP?: No  Was an appointment scheduled for the patient?: Yes  Appointment Date: 04/15/25  Appointment Time: 1420  Any issues or paperwork you wish to discuss with your PCP?: No  Are you (patient) able to get to the appointment?: Yes  If Home Health was ordered, have they contacted you (Patient): Not Applicable  Did you have enough support after your last discharge?: Yes  Does this patient qualify for the CCM program?: No (medicare)    Transitional Care  Number of attempts made to contact patient: 1  Current or previous attempts completed within two business days of discharge? : Yes  Provided education regarding treatment plan, medications, self-management, ADLs?: No  Has patient completed an Advanced Directive?: No  Has the Care Manager's phone number provided?: No  Is there anything else I can help you with?: No    Discharge Summary  Chief Complaint:   Wound Check        Pt w L BKA. Pt has a mis fitting prosthesis which has rubbed a sore.     Fever  Admitting Diagnosis: left lower extremity cellulitis  Discharge Diagnosis: Streptococcal bacteremia

## 2025-04-03 NOTE — PROGRESS NOTES
Pt discharged home with family.Prior to discharge, pt received HD, ordered dose of cefazolin, discharge medications, paperwork, and education. Pt provided opportunity to ask questions. Pt understands follow-ups and POC. Per MD, nephrology to manage abx outpatient. MD notified of short episode of tachycardia that resolved without intervention. PIVs removed by RN. Pt gathered belongings and escorted out by family; pt refused staff escort.

## 2025-04-04 ENCOUNTER — APPOINTMENT (OUTPATIENT)
Dept: MEDICAL GROUP | Facility: PHYSICIAN GROUP | Age: 55
End: 2025-04-04
Payer: MEDICARE

## 2025-04-08 NOTE — ASSESSMENT & PLAN NOTE
Received 900 cc of NS from EMS  -Continue to monitor and replete IV fluids as needed   There are no Wet Read(s) to document.

## 2025-04-09 NOTE — Clinical Note
REFERRAL APPROVAL NOTICE         Sent on April 9, 2025                   Beni Moore  [address] [address]                   Dear Mr. Moore,    After a careful review of the medical information and benefit coverage, [facility] has processed your referral. See below for additional details.    If applicable, you must be actively enrolled with your insurance for coverage of the authorized service. If you have any questions regarding your coverage, please contact your insurance directly.    REFERRAL INFORMATION   Referral #:  [id number]  Referred-To Provider    Referred-By Provider:  Orthotics    Preeti Mark M.D.   [employer] [facility] [employer] [facility] PROSTHETICS & ORTHOTICS [facility]      [address]  [facility] [address]  [phone] [address]  [place] [address]  [phone]    Referral Start Date:  04/02/2025  Referral End Date:   04/02/2026             SCHEDULING  If you do not already have an appointment, please call [phone] to make an appointment.     MORE INFORMATION  If you do not already have a [employer] account, sign up at: [email or web address]  You can access your medical information, make appointments, see lab results, billing information, and more.  If you have questions regarding this referral, please contact  the [facility] Referrals department at:             [phone]. Monday - Friday 8:00AM - 5:00PM.     Sincerely,    [facility]

## 2025-04-15 ENCOUNTER — OFFICE VISIT (OUTPATIENT)
Dept: MEDICAL GROUP | Facility: PHYSICIAN GROUP | Age: 55
End: 2025-04-15
Payer: MEDICARE

## 2025-04-15 VITALS
WEIGHT: 223.2 LBS | TEMPERATURE: 98.6 F | HEART RATE: 88 BPM | SYSTOLIC BLOOD PRESSURE: 150 MMHG | OXYGEN SATURATION: 97 % | RESPIRATION RATE: 18 BRPM | BODY MASS INDEX: 30.23 KG/M2 | HEIGHT: 72 IN | DIASTOLIC BLOOD PRESSURE: 70 MMHG

## 2025-04-15 DIAGNOSIS — J96.01: ICD-10-CM

## 2025-04-15 DIAGNOSIS — I21.4 NSTEMI (NON-ST ELEVATED MYOCARDIAL INFARCTION) (HCC): ICD-10-CM

## 2025-04-15 DIAGNOSIS — Z09 HOSPITAL DISCHARGE FOLLOW-UP: ICD-10-CM

## 2025-04-15 DIAGNOSIS — E78.49 OTHER HYPERLIPIDEMIA: Primary | ICD-10-CM

## 2025-04-15 DIAGNOSIS — I10 PRIMARY HYPERTENSION: Chronic | ICD-10-CM

## 2025-04-15 DIAGNOSIS — E11.3599 TYPE 2 DIABETES MELLITUS WITH PROLIFERATIVE RETINOPATHY WITHOUT MACULAR EDEMA, WITHOUT LONG-TERM CURRENT USE OF INSULIN, UNSPECIFIED LATERALITY (HCC): ICD-10-CM

## 2025-04-15 DIAGNOSIS — A40.0: ICD-10-CM

## 2025-04-15 DIAGNOSIS — R65.20: ICD-10-CM

## 2025-04-15 DIAGNOSIS — I34.0 MODERATE MITRAL REGURGITATION: Chronic | ICD-10-CM

## 2025-04-15 ASSESSMENT — FIBROSIS 4 INDEX: FIB4 SCORE: 2.84

## 2025-04-15 NOTE — PROGRESS NOTES
Subjective:     Beni Moore is a 54 y.o. male who presents for Hospital Follow-up.    Transitional Care Management  TCM Outreach Date and Time: Filed (4/3/2025  9:49 AM)    Discharge Questions  Actual Discharge Date: 04/02/25  Now that you are home, how are you feeling?: Good  Did you receive any new prescriptions?: Yes  Were you able to get them filled?: Yes  Meds to Bed or Pharmacy filled?: Meds to Bed  Do you have any questions about your current medications or new medications (Review Med Rec)?: No  Did you have any durable medical equipment ordered?: No  Do you have a follow up appointment scheduled with your PCP?: No  Was an appointment scheduled for the patient?: Yes  Appointment Date: 04/15/25  Appointment Time: 1420  Any issues or paperwork you wish to discuss with your PCP?: No  Are you (patient) able to get to the appointment?: Yes  If Home Health was ordered, have they contacted you (Patient): Not Applicable  Did you have enough support after your last discharge?: Yes  Does this patient qualify for the CCM program?: No (medicare)    Transitional Care  Number of attempts made to contact patient: 1  Current or previous attempts completed within two business days of discharge? : Yes  Provided education regarding treatment plan, medications, self-management, ADLs?: No  Has patient completed an Advanced Directive?: No  Has the Care Manager's phone number provided?: No  Is there anything else I can help you with?: No    Discharge Summary  Chief Complaint:   Wound Check        Pt w L BKA. Pt has a mis fitting prosthesis which has rubbed a sore.     Fever  Admitting Diagnosis: left lower extremity cellulitis  Discharge Diagnosis: Streptococcal bacteremia        HPI:   Recently hospitalized for cellulitis due to infection to his left leg secondary to his prosthesis rubbing against his leg.  Patient did have an echo done that question a possibility of vegetation on the heart valve.  Patient had a  transesophageal echo that showed no vegetation.  Patient does have a history of a heart attack in 2019 he has not seen cardiology since then and is agreeable to see cardiology again.  Patient states the infection on his leg stump has completely resolved and he is doing great.  Patient does go in for dialysis and will be getting lab work done sometime in early May.  And is also seeing endocrinology his last hemoglobin A1c that was recently done was 6.5.  Patient does see an eye specialist due to retinopathy change secondary to diabetes it is being treated for this    Current medicines (including reconciliation performed today)  Current Outpatient Medications   Medication Sig Dispense Refill    amLODIPine (NORVASC) 2.5 MG Tab Take 3 Tablets by mouth every day. 100 Tablet 3    acetaminophen (TYLENOL) 500 MG Tab Take 2 Tablets by mouth every 6 hours as needed for Moderate Pain. 30 Tablet 0    insulin glargine (LANTUS SOLOSTAR) 100 UNIT/ML Solution Pen-injector injection Inject 24 Units under the skin every evening. 9 mL 0    atorvastatin (LIPITOR) 80 MG tablet Take 0.5 Tablets by mouth every evening. 30 Tablet 2    [Paused] Methoxy PEG-Epoetin Beta (MIRCERA) 75 MCG/0.3ML Solution Prefilled Syringe Inject 75 mcg as directed every 14 days. [facility] [phone]      cholestyramine (QUESTRAN) 4 g packet Take 4 g by mouth 2 times a day for 360 days. 60 Each 11    torsemide (DEMADEX) 100 MG Tab Take 1 Tablet by mouth every day. 90 Tablet 3    losartan (COZAAR) 100 MG Tab Take 1 Tablet by mouth every day. 90 Tablet 3    metOLazone (ZAROXOLYN) 5 MG Tab Take 1 Tablet by mouth every day. 90 Tablet 3    aspirin 81 MG EC tablet Take 1 Tablet by mouth every day. 30 Tablet 2    omeprazole (PRILOSEC) 20 MG delayed-release capsule Take 20 mg by mouth every morning.          No current facility-administered medications for this visit.       Allergies:   Patient has no known allergies.    Social History     Tobacco Use     Smoking status: Former     Current packs/day: 0.00     Average packs/day: 0.6 packs/day for 17.0 years (10.0 ttl pk-yrs)     Types: Cigarettes     Start date: 1988     Quit date:      Years since quittin.3     Passive exposure: Never    Smokeless tobacco: Never   Vaping Use    Vaping status: Never Used   Substance Use Topics    Alcohol use: Not Currently     Comment: Quit     Drug use: Not Currently     Types: Oral     Comment: NOT CURRENTLY, HISTORY OF CBD       ROS:      Objective:     Vitals:    04/15/25 1402   BP: (!) 150/70   BP Location: Right arm   Patient Position: Sitting   BP Cuff Size: Adult   Pulse: 88   Resp: 18   Temp: 37 °C (98.6 °F)   SpO2: 97%   Weight: 101 kg (223 lb 3.2 oz)   Height: 1.829 m (6')     Body mass index is 30.27 kg/m².    Physical Exam:  Male in no acute distress  Lungs:clear  Heart: Heart regular rate there is a 2/6 systolic murmur noted    Assessment and Plan:   1. Hospital discharge follow-up  Patient appears to be doing well referral was written for him to get another prosthesis and he will schedule for that appointment.    2. Other hyperlipidemia  On his next blood drawl by dialysis would like him to get a lipid panel done also wrote a urgent referral to cardiology.  - Lipid Profile; Future  - REFERRAL TO CARDIOLOGY    3. Type 2 diabetes mellitus with proliferative retinopathy without macular edema, without long-term current use of insulin, unspecified laterality ([facility])  Patient does have appointment to see the eye provider along with endocrinology on his diabetes.  - REFERRAL TO CARDIOLOGY    4. NSTEMI (non-ST elevated myocardial infarction) ([facility])  Referral was written for patient to see cardiology  - REFERRAL TO CARDIOLOGY    5. Moderate mitral regurgitation  - REFERRAL TO CARDIOLOGY    6. Primary hypertension  Patient's blood pressure is slightly elevated but not as high as it was in the hospital patient feels his blood pressure is going down and will be seeing  nephrology if things need to be readjusted.    7. Sepsis due to group A Streptococcus with acute hypoxic respiratory failure without septic shock (HCC)  Patient is doing great states he is not having any issues after completion of his antibiotics    - Chart and discharge summary were reviewed.   - Hospitalization and results reviewed with patient.   - Medications reviewed including instructions regarding high risk medications, dosing and side effects.  - Recommended Services: No services needed at this time  - Advance directive/POLST on file?  No     Follow-up:Return in about 4 months (around 8/15/2025), or if symptoms worsen or fail to improve.    Face-to-face transitional care management services with [facility] (today's visit is within 14 days post discharge & LACE+ score of 28-58) medical decision complexity were provided.

## 2025-04-18 ENCOUNTER — HOSPITAL ENCOUNTER (OUTPATIENT)
Facility: MEDICAL CENTER | Age: 55
End: 2025-04-18
Attending: INTERNAL MEDICINE

## 2025-04-18 ENCOUNTER — HOSPITAL ENCOUNTER (OUTPATIENT)
Facility: MEDICAL CENTER | Age: 55
End: 2025-04-18
Attending: INTERNAL MEDICINE
Payer: COMMERCIAL

## 2025-04-18 LAB
CHOLEST SERPL-MCNC: 90 MG/DL (ref 100–199)
HDLC SERPL-MCNC: 42 MG/DL
LDLC SERPL CALC-MCNC: 34 MG/DL
TRIGL SERPL-MCNC: 71 MG/DL (ref 0–149)

## 2025-04-18 PROCEDURE — 80061 LIPID PANEL: CPT

## 2025-04-22 ENCOUNTER — TELEPHONE (OUTPATIENT)
Dept: HEALTH INFORMATION MANAGEMENT | Facility: OTHER | Age: 55
End: 2025-04-22
Payer: MEDICARE

## 2025-04-25 ENCOUNTER — HOSPITAL ENCOUNTER (OUTPATIENT)
Facility: MEDICAL CENTER | Age: 55
End: 2025-04-25
Attending: INTERNAL MEDICINE
Payer: COMMERCIAL

## 2025-04-25 LAB
BUN SERPL-MCNC: 38 MG/DL (ref 8–22)
BUN SERPL-MCNC: 81 MG/DL (ref 8–22)

## 2025-04-25 PROCEDURE — 84520 ASSAY OF UREA NITROGEN: CPT | Mod: 91

## 2025-04-28 ENCOUNTER — OFFICE VISIT (OUTPATIENT)
Dept: CARDIOLOGY | Facility: MEDICAL CENTER | Age: 55
End: 2025-04-28
Attending: FAMILY MEDICINE
Payer: MEDICARE

## 2025-04-28 VITALS
WEIGHT: 205 LBS | DIASTOLIC BLOOD PRESSURE: 82 MMHG | OXYGEN SATURATION: 95 % | RESPIRATION RATE: 16 BRPM | HEART RATE: 78 BPM | HEIGHT: 72 IN | BODY MASS INDEX: 27.77 KG/M2 | SYSTOLIC BLOOD PRESSURE: 154 MMHG

## 2025-04-28 DIAGNOSIS — I25.83 CORONARY ARTERY DISEASE DUE TO LIPID RICH PLAQUE: Chronic | ICD-10-CM

## 2025-04-28 DIAGNOSIS — E78.5 HYPERLIPIDEMIA, UNSPECIFIED HYPERLIPIDEMIA TYPE: ICD-10-CM

## 2025-04-28 DIAGNOSIS — I10 PRIMARY HYPERTENSION: ICD-10-CM

## 2025-04-28 DIAGNOSIS — I34.0 MODERATE MITRAL REGURGITATION: Chronic | ICD-10-CM

## 2025-04-28 DIAGNOSIS — I25.10 CORONARY ARTERY DISEASE DUE TO LIPID RICH PLAQUE: Chronic | ICD-10-CM

## 2025-04-28 DIAGNOSIS — I47.10 PAROXYSMAL SVT (SUPRAVENTRICULAR TACHYCARDIA) (HCC): Chronic | ICD-10-CM

## 2025-04-28 PROCEDURE — 99212 OFFICE O/P EST SF 10 MIN: CPT | Performed by: INTERNAL MEDICINE

## 2025-04-28 RX ORDER — ATORVASTATIN CALCIUM 40 MG/1
40 TABLET, FILM COATED ORAL EVERY EVENING
Qty: 90 TABLET | Refills: 3 | Status: SHIPPED | OUTPATIENT
Start: 2025-04-28

## 2025-04-28 ASSESSMENT — FIBROSIS 4 INDEX: FIB4 SCORE: 2.84

## 2025-04-28 NOTE — PROGRESS NOTES
Chief Complaint   Patient presents with    New Patient    Supraventricular Tachycardia (SVT)     F/V Dx: Paroxysmal SVT (supraventricular tachycardia) (HCC) -on telemetry and during MACKENZIE atrial tachycardia likely       Subjective     Beni Moore is a 54 y.o. male who presents today with CAD post PCI 2019, moderate MR     I did MACKENZIE for abnormal TTE in setting of bactermia        Past Medical History:   Diagnosis Date    Anesthesia     Hypotension post-op, persisted for a few months x 1    Anesthesia 12/06/2023    Patient stated after 2016 colonoscopy he became hypotensive.    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    Heart burn     GERD; takes Prilosec    Hemorrhagic disorder ([facility])     ASA protection for stent and cardiac history    High cholesterol     medicated x 2    History of non-ST elevation myocardial infarction (NSTEMI)     Per Problem List    Hx of heart artery stent 2019    Hyperlipidemia     Hyperparathyroidism due to renal insufficiency ([facility])     Per Problem List    Hypertension     medicated    Moderate mitral regurgitation 04/01/2025    Myocardial infarct ([facility]) 2019    FOLLOWED BY DR TO    Paroxysmal SVT (supraventricular tachycardia) (HCC) -on telemetry and during MACKENZIE atrial tachycardia likely 04/01/2025    Pneumonia     H/O    Renal disorder 12/06/2023    Stage 3 CKD; 12/9/2024 dialysis at [facility] on Wednesday's only via CVC     Past Surgical History:   Procedure Laterality Date    AV FISTULA REVISION Left 1/6/2025    Procedure: LEFT BRACHIOBASILIC FISTULA TRANSPOSITION;  Surgeon: Joyce Ureña M.D.;  Location: SURGERY SAME DAY [facility];  Service: General    AV  FISTULA CREATION Left 11/04/2024    Procedure: CREATION OF LEFT UPPER EXTREMITY  DIALYSIS FISTULA;  Surgeon: Joyce Ureña M.D.;  Location: SURGERY SAME DAY [facility];  Service: General    ORIF, FRACTURE, HUMERUS, PROXIMAL Left 08/30/2024    Procedure: LEFT OPEN REDUCTION INTERNAL FIXATION  HUMERUS, PROXIMAL, NON UNION REPAIR;  Surgeon: Aris Pierre M.D.;  Location: SURGERY [facility];  Service: Orthopedics    KNEE AMPUTATION BELOW Left 02/01/2024    Procedure: AMPUTATION, BELOW KNEE;  Surgeon: Celestino Segura M.D.;  Location: SURGERY [facility];  Service: Orthopedics    TOE AMPUTATION Left 01/30/2024    Procedure: AMPUTATION, TOE- 5TH;  Surgeon: Celestino Segura M.D.;  Location: [facility];  Service: Orthopedics    PB OPEN TREATMENT PBOX HUMERAL FRACTURE Left 12/11/2023    Procedure: LEFT OPEN REDUCTION INTERNAL FIXATION  HUMERUS, PROXIMAL AND LEFT BICEPS TENODESIS;  Surgeon: Aris Pierre M.D.;  Location: [facility];  Service: Orthopedics    PB REPAIR BICEPS LONG TENDON Left 12/11/2023    Procedure: TENODESIS, BICEPS, OPEN;  Surgeon: Aris Pierre M.D.;  Location: [facility];  Service: Orthopedics    CATARACT EXTRACTION WITH IOL Bilateral 2023    TOE AMPUTATION Left 09/30/2021    Procedure: AMPUTATION, TOE;  Surgeon: Tomer Hart M.D.;  Location: [facility];  Service: Orthopedics    STENT PLACEMENT  2019    STEMI with nonobstructive LAD    TOE AMPUTATION Right 07/02/2018    Procedure: Transmetatarsal amputation;  Surgeon: Ravi Bernardo M.D.;  Location: [facility];  Service: Orthopedics    ACHILLES TENDON REPAIR Right 07/02/2018    Procedure: ACHILLES LENGTHENING;  Surgeon: Ravi Bernardo M.D.;  Location: [facility];  Service: Orthopedics    IRRIGATION & DEBRIDEMENT ORTHO Right 07/02/2018    Procedure: IRRIGATION & DEBRIDEMENT ORTHO;  Surgeon: Ravi Bernardo M.D.;  Location: [facility];  Service:  Orthopedics    OTHER CARDIAC SURGERY  2019    Stent installed right side    OTHER ORTHOPEDIC SURGERY      SHOULDER SURGERY  2023    Something is not right in my shoulder/arm    TONSILLECTOMY      VASECTOMY       Family History   Problem Relation Age of Onset    No Known Problems Mother     Diabetes Father         Adult onset    Heart Disease Father     Hypertension Father     Diabetes Maternal Grandmother     Heart Disease Maternal Grandfather     Lung Disease Paternal Grandmother     Cancer Paternal Grandmother     Cancer Paternal Grandfather     Lung Cancer Paternal Grandfather     Kidney Disease Neg Hx      Social History     Socioeconomic History    Marital status:      Spouse name: Not on file    Number of children: Not on file    Years of education: Not on file    Highest education level: Some college, no degree   Occupational History    Not on file   Tobacco Use    Smoking status: Former     Current packs/day: 0.00     Average packs/day: 0.6 packs/day for 17.0 years (10.0 ttl pk-yrs)     Types: Cigarettes     Start date: 1988     Quit date:      Years since quittin.3     Passive exposure: Never    Smokeless tobacco: Never   Vaping Use    Vaping status: Never Used   Substance and Sexual Activity    Alcohol use: Not Currently     Comment: Quit     Drug use: Not Currently     Types: Oral     Comment: NOT CURRENTLY, HISTORY OF CBD    Sexual activity: Not Currently     Partners: Female   Other Topics Concern    Not on file   Social History Narrative    Not on file     Social Drivers of Health     Financial Resource Strain: Low Risk  (2022)    Overall Financial Resource Strain (CARDIA)     Difficulty of Paying Living Expenses: Not hard at all   Food Insecurity: No Food Insecurity (3/28/2025)    Hunger Vital Sign     Worried About Running Out of Food in the Last Year: Never true     Ran Out of Food in the Last Year: Never true   Transportation Needs: No Transportation Needs  (3/28/2025)    PRAPARE - Transportation     Lack of Transportation (Medical): No     Lack of Transportation (Non-Medical): No   Physical Activity: Insufficiently Active (12/13/2022)    Exercise Vital Sign     Days of Exercise per Week: 2 days     Minutes of Exercise per Session: 40 min   Stress: No Stress Concern Present (12/13/2022)    [employer] [place] [employer] - Occupational Stress Questionnaire     Feeling of Stress : Not at all   Social Connections: Moderately Integrated (12/13/2022)    Social Connection and Isolation Panel [NHANES]     Frequency of Communication with Friends and Family: More than three times a week     Frequency of Social Gatherings with Friends and Family: More than three times a week     Attends Jew Services: Never     Active Member of Clubs or Organizations: Yes     Attends Club or Organization Meetings: Never     Marital Status:    Intimate Partner Violence: Not At Risk (3/28/2025)    Humiliation, Afraid, Rape, and Kick questionnaire     Fear of Current or Ex-Partner: No     Emotionally Abused: No     Physically Abused: No     Sexually Abused: No   Housing Stability: Low Risk  (3/28/2025)    Housing Stability Vital Sign     Unable to Pay for Housing in the Last Year: No     Number of Times Moved in the Last Year: 0     Homeless in the Last Year: No     No Known Allergies  Outpatient Encounter Medications as of 4/28/2025   Medication Sig Dispense Refill    insulin NPH (HUMULIN/NOVOLIN N) 100 UNIT/ML Suspension Inject  under the skin.      amLODIPine (NORVASC) 2.5 MG Tab Take 3 Tablets by mouth every day. 100 Tablet 3    acetaminophen (TYLENOL) 500 MG Tab Take 2 Tablets by mouth every 6 hours as needed for Moderate Pain. 30 Tablet 0    insulin glargine (LANTUS SOLOSTAR) 100 UNIT/ML Solution Pen-injector injection Inject 24 Units under the skin every evening. 9 mL 0    atorvastatin (LIPITOR) 80 MG tablet Take 0.5 Tablets by mouth every evening. 30 Tablet 2     [Paused] Methoxy PEG-Epoetin Beta (MIRCERA) 75 MCG/0.3ML Solution Prefilled Syringe Inject 75 mcg as directed every 14 days. Elizabeth Mendez [phone]      cholestyramine (QUESTRAN) 4 g packet Take 4 g by mouth 2 times a day for 360 days. 60 Each 11    torsemide (DEMADEX) 100 MG Tab Take 1 Tablet by mouth every day. 90 Tablet 3    losartan (COZAAR) 100 MG Tab Take 1 Tablet by mouth every day. 90 Tablet 3    metOLazone (ZAROXOLYN) 5 MG Tab Take 1 Tablet by mouth every day. 90 Tablet 3    aspirin 81 MG EC tablet Take 1 Tablet by mouth every day. 30 Tablet 2    omeprazole (PRILOSEC) 20 MG delayed-release capsule Take 20 mg by mouth every morning.          No facility-administered encounter medications on file as of 4/28/2025.     ROS           Objective     BP (!) 154/82 (BP Location: Left arm, Patient Position: Sitting, BP Cuff Size: Adult)   Pulse 78   Resp 16   Ht 1.829 m (6')   Wt 93 kg (205 lb)   SpO2 95%   BMI 27.80 kg/m²     Physical Exam  Constitutional:       General: He is not in acute distress.     Appearance: He is not diaphoretic.   Eyes:      General: No scleral icterus.  Neck:      Vascular: No JVD.   Cardiovascular:      Rate and Rhythm: Normal rate.      Heart sounds: Normal heart sounds. No murmur heard.     No friction rub. No gallop.   Pulmonary:      Effort: No respiratory distress.      Breath sounds: No wheezing or rales.   Abdominal:      General: Bowel sounds are normal.      Palpations: Abdomen is soft.   Musculoskeletal:      Right lower leg: No edema.      Left lower leg: No edema.   Skin:     Findings: No rash.   Neurological:      Mental Status: He is alert. Mental status is at baseline.   Psychiatric:         Mood and Affect: Mood normal.            We reviewed in person the most recent labs  Recent Results (from the past 30 weeks)   POCT glucose device results    Collection Time: 11/04/24  7:36 AM   Result Value Ref Range    POC Glucose, Blood 102 (H) 65 - 99 mg/dL   CBC  Without Differential    Collection Time: 24  7:48 AM   Result Value Ref Range    WBC 4.0 (L) 4.8 - 10.8 K/uL    RBC 3.40 (L) 4.70 - 6.10 M/uL    Hemoglobin 10.3 (L) 14.0 - 18.0 g/dL    Hematocrit 30.8 (L) 42.0 - 52.0 %    MCV 90.6 81.4 - 97.8 fL    MCH 30.3 27.0 - 33.0 pg    MCHC 33.4 32.3 - 36.5 g/dL    RDW 44.3 35.9 - 50.0 fL    Platelet Count 110 (L) 164 - 446 K/uL    MPV 10.4 9.0 - 12.9 fL   Basic Metabolic Panel    Collection Time: 24  7:48 AM   Result Value Ref Range    Sodium 134 (L) 135 - 145 mmol/L    Potassium 3.4 (L) 3.6 - 5.5 mmol/L    Chloride 100 96 - 112 mmol/L    Co2 20 20 - 33 mmol/L    Glucose 110 (H) 65 - 99 mg/dL    Bun 77 (H) 8 - 22 mg/dL    Creatinine 9.81 (HH) 0.50 - 1.40 mg/dL    Calcium 7.4 (L) 8.5 - 10.5 mg/dL    Anion Gap 14.0 7.0 - 16.0   ESTIMATED GFR    Collection Time: 24  7:48 AM   Result Value Ref Range    GFR (CKD-EPI) 6 (A) >60 mL/min/1.73 m 2   ECG    Collection Time: 24  8:13 AM   Result Value Ref Range    Report       [facility]    Test Date:  2024  Pt Name:    HELIO BARROW                     Department: [facility]  MRN:        [id number]                      Room:       [facility]  Gender:     Male                         Technician: SHANNAN  :        1970                   Requested By:ANA PAULA EID  Order #:    [id number]                    Reading MD: Gee Harris MD    Measurements  Intervals                                Axis  Rate:       72                           P:          38  NM:         131                          QRS:        49  QRSD:       115                          T:          43  QT:         432  QTc:        473    Interpretive Statements  Sinus rhythm  Nonspecific intraventricular conduction delay  Compared to ECG 2024 14:19:13  Ventricular premature complex(es) no longer present  T-wave abnormality no longer present  Electronically Signed On 2024 17:25:24 PST by Gee Harris MD     POCT glucose device  results    Collection Time: 11/04/24 10:33 AM   Result Value Ref Range    POC Glucose, Blood 94 65 - 99 mg/dL   Comp Metabolic Panel    Collection Time: 01/06/25  6:52 AM   Result Value Ref Range    Sodium 136 135 - 145 mmol/L    Potassium 3.5 (L) 3.6 - 5.5 mmol/L    Chloride 95 (L) 96 - 112 mmol/L    Co2 20 20 - 33 mmol/L    Anion Gap 21.0 (H) 7.0 - 16.0    Glucose 159 (H) 65 - 99 mg/dL    Bun 90 (H) 8 - 22 mg/dL    Creatinine 9.87 (HH) 0.50 - 1.40 mg/dL    Calcium 6.5 (LL) 8.5 - 10.5 mg/dL    Correct Calcium 6.9 (LL) 8.5 - 10.5 mg/dL    AST(SGOT) 10 (L) 12 - 45 U/L    ALT(SGPT) 7 2 - 50 U/L    Alkaline Phosphatase 66 30 - 99 U/L    Total Bilirubin 0.8 0.1 - 1.5 mg/dL    Albumin 3.5 3.2 - 4.9 g/dL    Total Protein 6.1 6.0 - 8.2 g/dL    Globulin 2.6 1.9 - 3.5 g/dL    A-G Ratio 1.3 g/dL   CBC Without Differential    Collection Time: 01/06/25  6:52 AM   Result Value Ref Range    WBC 7.2 4.8 - 10.8 K/uL    RBC 3.08 (L) 4.70 - 6.10 M/uL    Hemoglobin 9.4 (L) 14.0 - 18.0 g/dL    Hematocrit 27.4 (L) 42.0 - 52.0 %    MCV 89.0 81.4 - 97.8 fL    MCH 30.5 27.0 - 33.0 pg    MCHC 34.3 32.3 - 36.5 g/dL    RDW 40.2 35.9 - 50.0 fL    Platelet Count 136 (L) 164 - 446 K/uL    MPV 10.3 9.0 - 12.9 fL   ESTIMATED GFR    Collection Time: 01/06/25  6:52 AM   Result Value Ref Range    GFR (CKD-EPI) 6 (A) >60 mL/min/1.73 m 2   POCT glucose device results    Collection Time: 01/06/25  6:55 AM   Result Value Ref Range    POC Glucose, Blood 153 (H) 65 - 99 mg/dL   Lactic Acid    Collection Time: 03/28/25  4:07 PM   Result Value Ref Range    Lactic Acid 1.5 0.5 - 2.0 mmol/L   CBC with Differential    Collection Time: 03/28/25  4:07 PM   Result Value Ref Range    WBC 12.5 (H) 4.8 - 10.8 K/uL    RBC 2.42 (L) 4.70 - 6.10 M/uL    Hemoglobin 7.4 (L) 14.0 - 18.0 g/dL    Hematocrit 22.9 (L) 42.0 - 52.0 %    MCV 94.6 81.4 - 97.8 fL    MCH 30.6 27.0 - 33.0 pg    MCHC 32.3 32.3 - 36.5 g/dL    RDW 43.4 35.9 - 50.0 fL    Platelet Count 150 (L) 164 - 446  K/uL    MPV 10.9 9.0 - 12.9 fL    Neutrophils-Polys 86.80 (H) 44.00 - 72.00 %    Lymphocytes 5.20 (L) 22.00 - 41.00 %    Monocytes 6.60 0.00 - 13.40 %    Eosinophils 0.20 0.00 - 6.90 %    Basophils 0.40 0.00 - 1.80 %    Immature Granulocytes 0.80 0.00 - 0.90 %    Nucleated RBC 0.00 0.00 - 0.20 /100 WBC    Neutrophils (Absolute) 10.89 (H) 1.82 - 7.42 K/uL    Lymphs (Absolute) 0.65 (L) 1.00 - 4.80 K/uL    Monos (Absolute) 0.83 0.00 - 0.85 K/uL    Eos (Absolute) 0.02 0.00 - 0.51 K/uL    Baso (Absolute) 0.05 0.00 - 0.12 K/uL    Immature Granulocytes (abs) 0.10 0.00 - 0.11 K/uL    NRBC (Absolute) 0.00 K/uL   Complete Metabolic Panel    Collection Time: 03/28/25  4:07 PM   Result Value Ref Range    Sodium 132 (L) 135 - 145 mmol/L    Potassium 4.3 3.6 - 5.5 mmol/L    Chloride 94 (L) 96 - 112 mmol/L    Co2 23 20 - 33 mmol/L    Anion Gap 15.0 7.0 - 16.0    Glucose 179 (H) 65 - 99 mg/dL    Bun 51 (H) 8 - 22 mg/dL    Creatinine 6.48 (HH) 0.50 - 1.40 mg/dL    Calcium 8.3 (L) 8.5 - 10.5 mg/dL    Correct Calcium 8.5 8.5 - 10.5 mg/dL    AST(SGOT) 19 12 - 45 U/L    ALT(SGPT) 9 2 - 50 U/L    Alkaline Phosphatase 58 30 - 99 U/L    Total Bilirubin 1.2 0.1 - 1.5 mg/dL    Albumin 3.8 3.2 - 4.9 g/dL    Total Protein 7.0 6.0 - 8.2 g/dL    Globulin 3.2 1.9 - 3.5 g/dL    A-G Ratio 1.2 g/dL   ESTIMATED GFR    Collection Time: 03/28/25  4:07 PM   Result Value Ref Range    GFR (CKD-EPI) 9 (A) >60 mL/min/1.73 m 2   Prothrombin time (INR)    Collection Time: 03/28/25  4:07 PM   Result Value Ref Range    PT 15.2 (H) 12.0 - 14.6 sec    INR 1.20 (H) 0.87 - 1.13   MAGNESIUM    Collection Time: 03/28/25  4:07 PM   Result Value Ref Range    Magnesium 1.6 1.5 - 2.5 mg/dL   Blood Culture - Draw one from central line and one from peripheral site    Collection Time: 03/28/25  4:32 PM    Specimen: Peripheral; Blood   Result Value Ref Range    Significant Indicator NEG     Source BLD     Site PERIPHERAL     Culture Result No growth after 5 days of  incubation.    Blood Culture - Draw one from central line and one from peripheral site    Collection Time: 03/28/25  4:32 PM    Specimen: Line; Blood   Result Value Ref Range    Significant Indicator POS (POS)     Source BLD     Site Peripheral     Culture Result (A)      Growth detected by automated blood culture system. 03/29/2025  10:55      Culture Result (A)      Streptococcus pyogenes (Group A)  This isolate does NOT demonstrate inducible Clindamycin  resistance in vitro.         Susceptibility    Streptococcus pyogenes (group a) - KB     Clindamycin - Sensitive mm     Penicillin 0.016 Sensitive mcg/mL     Cefotaxime 0.012 Sensitive mcg/mL     Erythromycin 0.125 Sensitive mcg/mL   E-Test    Collection Time: 03/28/25  4:32 PM    Specimen: Other   Result Value Ref Range    ETEST Sensitivity INTERIM    COD - Adult (Type and Screen)    Collection Time: 03/28/25  6:25 PM   Result Value Ref Range    ABO Grouping Only A     Rh Grouping Only POS     Antibody Screen-Cod NEG     Component R       R99                 Red Cells, LR       [id number]   transfused   03/29/25   02:51      Product Type R99     Dispense Status transfused     Unit Number (Barcoded) [id number]     Product Code (Barcoded) M7522R05     Blood Type (Barcoded) 6200     Component R       R99                 Red Cells, LR       [id number]   transfused   03/29/25   12:37      Product Type R99     Dispense Status transfused     Unit Number (Barcoded) [id number]     Product Code (Barcoded) A2686K66     Blood Type (Barcoded) 6200    POC CoV-2, FLU A/B, RSV by PCR    Collection Time: 03/28/25  6:26 PM   Result Value Ref Range    POC Influenza A RNA, PCR Negative Negative    POC Influenza B RNA, PCR Negative Negative    POC RSV, by PCR Negative Negative    POC SARS-CoV-2, PCR NotDetected NotDetected   POCT glucose device results    Collection Time: 03/28/25  8:18 PM   Result Value Ref Range    POC Glucose, Blood 129 (H) 65 - 99 mg/dL   MRSA By PCR  (Amp)    Collection Time: 03/28/25  8:41 PM    Specimen: Nares; Respirate   Result Value Ref Range    MRSA by PCR Negative Negative   CBC with Differential    Collection Time: 03/29/25  8:17 AM   Result Value Ref Range    WBC 8.6 4.8 - 10.8 K/uL    RBC 2.42 (L) 4.70 - 6.10 M/uL    Hemoglobin 7.3 (L) 14.0 - 18.0 g/dL    Hematocrit 23.3 (L) 42.0 - 52.0 %    MCV 96.3 81.4 - 97.8 fL    MCH 30.2 27.0 - 33.0 pg    MCHC 31.3 (L) 32.3 - 36.5 g/dL    RDW 47.3 35.9 - 50.0 fL    Platelet Count 118 (L) 164 - 446 K/uL    MPV 10.1 9.0 - 12.9 fL    Neutrophils-Polys 73.60 (H) 44.00 - 72.00 %    Lymphocytes 14.60 (L) 22.00 - 41.00 %    Monocytes 10.00 0.00 - 13.40 %    Eosinophils 0.50 0.00 - 6.90 %    Basophils 0.60 0.00 - 1.80 %    Immature Granulocytes 0.70 0.00 - 0.90 %    Nucleated RBC 0.00 0.00 - 0.20 /100 WBC    Neutrophils (Absolute) 6.32 1.82 - 7.42 K/uL    Lymphs (Absolute) 1.25 1.00 - 4.80 K/uL    Monos (Absolute) 0.86 (H) 0.00 - 0.85 K/uL    Eos (Absolute) 0.04 0.00 - 0.51 K/uL    Baso (Absolute) 0.05 0.00 - 0.12 K/uL    Immature Granulocytes (abs) 0.06 0.00 - 0.11 K/uL    NRBC (Absolute) 0.00 K/uL   Comp Metabolic Panel (CMP)    Collection Time: 03/29/25  8:17 AM   Result Value Ref Range    Sodium 132 (L) 135 - 145 mmol/L    Potassium 4.3 3.6 - 5.5 mmol/L    Chloride 96 96 - 112 mmol/L    Co2 22 20 - 33 mmol/L    Anion Gap 14.0 7.0 - 16.0    Glucose 92 65 - 99 mg/dL    Bun 57 (H) 8 - 22 mg/dL    Creatinine 7.76 (HH) 0.50 - 1.40 mg/dL    Calcium 7.7 (L) 8.5 - 10.5 mg/dL    Correct Calcium 8.4 (L) 8.5 - 10.5 mg/dL    AST(SGOT) 14 12 - 45 U/L    ALT(SGPT) <5 2 - 50 U/L    Alkaline Phosphatase 47 30 - 99 U/L    Total Bilirubin 1.6 (H) 0.1 - 1.5 mg/dL    Albumin 3.1 (L) 3.2 - 4.9 g/dL    Total Protein 6.1 6.0 - 8.2 g/dL    Globulin 3.0 1.9 - 3.5 g/dL    A-G Ratio 1.0 g/dL   ESTIMATED GFR    Collection Time: 03/29/25  8:17 AM   Result Value Ref Range    GFR (CKD-EPI) 8 (A) >60 mL/min/1.73 m 2   POCT glucose device results     Collection Time: 03/29/25  9:18 AM   Result Value Ref Range    POC Glucose, Blood 81 65 - 99 mg/dL   Culture Wound w/Gram Stain    Collection Time: 03/29/25 11:30 AM    Specimen: Left Leg; Wound   Result Value Ref Range    Significant Indicator POS (POS)     Source WND     Site LEFT LEG     Culture Result - (A)     Gram Stain Result No organisms seen.     Culture Result (A)      Streptococcus pyogenes (Group A)  Moderate growth     GRAM STAIN    Collection Time: 03/29/25 11:30 AM    Specimen: Wound   Result Value Ref Range    Significant Indicator .     Source WND     Site LEFT LEG     Gram Stain Result No organisms seen.    POCT glucose device results    Collection Time: 03/29/25 11:44 AM   Result Value Ref Range    POC Glucose, Blood 99 65 - 99 mg/dL   HEPATITIS PANEL ACUTE(4 COMPONENTS)    Collection Time: 03/29/25 12:21 PM   Result Value Ref Range    Hepatitis B Surface Antigen Non-Reactive Non-Reactive    Hepatitis B Cors Ab,IgM Non-Reactive Non-Reactive    Hepatitis A Virus Ab, IgM Non-Reactive Non-Reactive    Hepatitis C Antibody Non-Reactive Non-Reactive   HEP B SURFACE AB    Collection Time: 03/29/25 12:21 PM   Result Value Ref Range    Hep B Surface Antibody Quant <3.50 0.00 - 10.00 mIU/mL   POCT glucose device results    Collection Time: 03/29/25  6:06 PM   Result Value Ref Range    POC Glucose, Blood 97 65 - 99 mg/dL   CBC WITHOUT DIFFERENTIAL    Collection Time: 03/29/25  7:40 PM   Result Value Ref Range    WBC 6.9 4.8 - 10.8 K/uL    RBC 2.81 (L) 4.70 - 6.10 M/uL    Hemoglobin 8.5 (L) 14.0 - 18.0 g/dL    Hematocrit 26.4 (L) 42.0 - 52.0 %    MCV 94.0 81.4 - 97.8 fL    MCH 30.2 27.0 - 33.0 pg    MCHC 32.2 (L) 32.3 - 36.5 g/dL    RDW 47.8 35.9 - 50.0 fL    Platelet Count 121 (L) 164 - 446 K/uL    MPV 10.5 9.0 - 12.9 fL   POCT glucose device results    Collection Time: 03/29/25 11:26 PM   Result Value Ref Range    POC Glucose, Blood 121 (H) 65 - 99 mg/dL   Comp Metabolic Panel    Collection Time: 03/30/25   2:12 AM   Result Value Ref Range    Sodium 133 (L) 135 - 145 mmol/L    Potassium 4.2 3.6 - 5.5 mmol/L    Chloride 97 96 - 112 mmol/L    Co2 26 20 - 33 mmol/L    Anion Gap 10.0 7.0 - 16.0    Glucose 109 (H) 65 - 99 mg/dL    Bun 38 (H) 8 - 22 mg/dL    Creatinine 5.84 (HH) 0.50 - 1.40 mg/dL    Calcium 8.0 (L) 8.5 - 10.5 mg/dL    Correct Calcium 8.7 8.5 - 10.5 mg/dL    AST(SGOT) 16 12 - 45 U/L    ALT(SGPT) 8 2 - 50 U/L    Alkaline Phosphatase 69 30 - 99 U/L    Total Bilirubin 1.6 (H) 0.1 - 1.5 mg/dL    Albumin 3.1 (L) 3.2 - 4.9 g/dL    Total Protein 6.2 6.0 - 8.2 g/dL    Globulin 3.1 1.9 - 3.5 g/dL    A-G Ratio 1.0 g/dL   CBC WITHOUT DIFFERENTIAL    Collection Time: 03/30/25  2:12 AM   Result Value Ref Range    WBC 6.2 4.8 - 10.8 K/uL    RBC 2.59 (L) 4.70 - 6.10 M/uL    Hemoglobin 8.1 (L) 14.0 - 18.0 g/dL    Hematocrit 24.6 (L) 42.0 - 52.0 %    MCV 95.0 81.4 - 97.8 fL    MCH 31.3 27.0 - 33.0 pg    MCHC 32.9 32.3 - 36.5 g/dL    RDW 49.1 35.9 - 50.0 fL    Platelet Count 128 (L) 164 - 446 K/uL    MPV 10.8 9.0 - 12.9 fL   ESTIMATED GFR    Collection Time: 03/30/25  2:12 AM   Result Value Ref Range    GFR (CKD-EPI) 11 (A) >60 mL/min/1.73 m 2   Urinalysis    Collection Time: 03/30/25  5:32 AM    Specimen: Urine   Result Value Ref Range    Color Yellow     Character Clear     Specific Gravity 1.018 <1.035    Ph 8.0 5.0 - 8.0    Glucose 250 (A) Negative mg/dL    Ketones Negative Negative mg/dL    Protein >=1000 (A) Negative mg/dL    Bilirubin Negative Negative    Urobilinogen, Urine 0.2 <=1.0 EU/dL    Nitrite Negative Negative    Leukocyte Esterase Negative Negative    Occult Blood Small (A) Negative    Micro Urine Req Microscopic    Urine Culture (New)    Collection Time: 03/30/25  5:32 AM    Specimen: Urine   Result Value Ref Range    Significant Indicator NEG     Source UR     Site -     Culture Result No growth at 48 hours.    URINE MICROSCOPIC (W/UA)    Collection Time: 03/30/25  5:32 AM   Result Value Ref Range    WBC 0-2  /hpf    RBC 11-20 (A) 0 - 2 /hpf    Bacteria None Seen None /hpf    Epithelial Cells 0-2 0 - 5 /hpf    Urine Casts 0-2 0 - 2 /lpf   EC-ECHOCARDIOGRAM COMPLETE W/O CONT    Collection Time: 03/30/25  8:35 AM   Result Value Ref Range    Eject.Frac. MOD BP 55.52     Eject.Frac. MOD 4C 59.81     Eject.Frac. MOD 2C 54.21    POCT glucose device results    Collection Time: 03/30/25  8:49 AM   Result Value Ref Range    POC Glucose, Blood 116 (H) 65 - 99 mg/dL   POCT glucose device results    Collection Time: 03/30/25  1:08 PM   Result Value Ref Range    POC Glucose, Blood 144 (H) 65 - 99 mg/dL   POCT glucose device results    Collection Time: 03/30/25  5:37 PM   Result Value Ref Range    POC Glucose, Blood 158 (H) 65 - 99 mg/dL   POCT glucose device results    Collection Time: 03/30/25  9:23 PM   Result Value Ref Range    POC Glucose, Blood 152 (H) 65 - 99 mg/dL   Comp Metabolic Panel    Collection Time: 03/31/25  1:56 AM   Result Value Ref Range    Sodium 131 (L) 135 - 145 mmol/L    Potassium 4.2 3.6 - 5.5 mmol/L    Chloride 96 96 - 112 mmol/L    Co2 22 20 - 33 mmol/L    Anion Gap 13.0 7.0 - 16.0    Glucose 160 (H) 65 - 99 mg/dL    Bun 50 (H) 8 - 22 mg/dL    Creatinine 7.23 (HH) 0.50 - 1.40 mg/dL    Calcium 7.7 (L) 8.5 - 10.5 mg/dL    Correct Calcium 8.5 8.5 - 10.5 mg/dL    AST(SGOT) 15 12 - 45 U/L    ALT(SGPT) <5 2 - 50 U/L    Alkaline Phosphatase 50 30 - 99 U/L    Total Bilirubin 1.0 0.1 - 1.5 mg/dL    Albumin 3.0 (L) 3.2 - 4.9 g/dL    Total Protein 6.0 6.0 - 8.2 g/dL    Globulin 3.0 1.9 - 3.5 g/dL    A-G Ratio 1.0 g/dL   CBC WITHOUT DIFFERENTIAL    Collection Time: 03/31/25  1:56 AM   Result Value Ref Range    WBC 6.8 4.8 - 10.8 K/uL    RBC 2.68 (L) 4.70 - 6.10 M/uL    Hemoglobin 8.1 (L) 14.0 - 18.0 g/dL    Hematocrit 25.4 (L) 42.0 - 52.0 %    MCV 94.8 81.4 - 97.8 fL    MCH 30.2 27.0 - 33.0 pg    MCHC 31.9 (L) 32.3 - 36.5 g/dL    RDW 47.5 35.9 - 50.0 fL    Platelet Count 128 (L) 164 - 446 K/uL    MPV 10.6 9.0 - 12.9 fL    ESTIMATED GFR    Collection Time: 03/31/25  1:56 AM   Result Value Ref Range    GFR (CKD-EPI) 8 (A) >60 mL/min/1.73 m 2   POCT glucose device results    Collection Time: 03/31/25  9:35 AM   Result Value Ref Range    POC Glucose, Blood 129 (H) 65 - 99 mg/dL   POCT glucose device results    Collection Time: 03/31/25 12:07 PM   Result Value Ref Range    POC Glucose, Blood 149 (H) 65 - 99 mg/dL   BLOOD CULTURE    Collection Time: 03/31/25 12:55 PM    Specimen: Peripheral; Blood   Result Value Ref Range    Significant Indicator NEG     Source BLD     Site PERIPHERAL     Culture Result No growth after 5 days of incubation.    BLOOD CULTURE    Collection Time: 03/31/25 12:57 PM    Specimen: Peripheral; Blood   Result Value Ref Range    Significant Indicator NEG     Source BLD     Site PERIPHERAL     Culture Result No growth after 5 days of incubation.    POCT glucose device results    Collection Time: 03/31/25  6:21 PM   Result Value Ref Range    POC Glucose, Blood 128 (H) 65 - 99 mg/dL   POCT glucose device results    Collection Time: 03/31/25 11:19 PM   Result Value Ref Range    POC Glucose, Blood 156 (H) 65 - 99 mg/dL   Comp Metabolic Panel    Collection Time: 04/01/25  4:10 AM   Result Value Ref Range    Sodium 133 (L) 135 - 145 mmol/L    Potassium 4.2 3.6 - 5.5 mmol/L    Chloride 97 96 - 112 mmol/L    Co2 23 20 - 33 mmol/L    Anion Gap 13.0 7.0 - 16.0    Glucose 154 (H) 65 - 99 mg/dL    Bun 34 (H) 8 - 22 mg/dL    Creatinine 5.91 (HH) 0.50 - 1.40 mg/dL    Calcium 7.7 (L) 8.5 - 10.5 mg/dL    Correct Calcium 8.4 (L) 8.5 - 10.5 mg/dL    AST(SGOT) 17 12 - 45 U/L    ALT(SGPT) <5 2 - 50 U/L    Alkaline Phosphatase 54 30 - 99 U/L    Total Bilirubin 1.0 0.1 - 1.5 mg/dL    Albumin 3.1 (L) 3.2 - 4.9 g/dL    Total Protein 6.2 6.0 - 8.2 g/dL    Globulin 3.1 1.9 - 3.5 g/dL    A-G Ratio 1.0 g/dL   CBC WITHOUT DIFFERENTIAL    Collection Time: 04/01/25  4:10 AM   Result Value Ref Range    WBC 5.2 4.8 - 10.8 K/uL    RBC 2.80 (L) 4.70  - 6.10 M/uL    Hemoglobin 8.4 (L) 14.0 - 18.0 g/dL    Hematocrit 25.7 (L) 42.0 - 52.0 %    MCV 91.8 81.4 - 97.8 fL    MCH 30.0 27.0 - 33.0 pg    MCHC 32.7 32.3 - 36.5 g/dL    RDW 44.3 35.9 - 50.0 fL    Platelet Count 127 (L) 164 - 446 K/uL    MPV 10.4 9.0 - 12.9 fL   ESTIMATED GFR    Collection Time: 04/01/25  4:10 AM   Result Value Ref Range    GFR (CKD-EPI) 10 (A) >60 mL/min/1.73 m 2   PHOSPHORUS    Collection Time: 04/01/25  4:10 AM   Result Value Ref Range    Phosphorus 3.7 2.5 - 4.5 mg/dL   POCT glucose device results    Collection Time: 04/01/25  7:37 AM   Result Value Ref Range    POC Glucose, Blood 130 (H) 65 - 99 mg/dL   POCT glucose device results    Collection Time: 04/01/25 12:36 PM   Result Value Ref Range    POC Glucose, Blood 126 (H) 65 - 99 mg/dL   POCT glucose device results    Collection Time: 04/01/25  5:36 PM   Result Value Ref Range    POC Glucose, Blood 163 (H) 65 - 99 mg/dL   POCT glucose device results    Collection Time: 04/01/25  8:51 PM   Result Value Ref Range    POC Glucose, Blood 178 (H) 65 - 99 mg/dL   Comp Metabolic Panel    Collection Time: 04/02/25  1:14 AM   Result Value Ref Range    Sodium 134 (L) 135 - 145 mmol/L    Potassium 4.4 3.6 - 5.5 mmol/L    Chloride 96 96 - 112 mmol/L    Co2 23 20 - 33 mmol/L    Anion Gap 15.0 7.0 - 16.0    Glucose 127 (H) 65 - 99 mg/dL    Bun 42 (H) 8 - 22 mg/dL    Creatinine 7.20 (HH) 0.50 - 1.40 mg/dL    Calcium 7.7 (L) 8.5 - 10.5 mg/dL    Correct Calcium 8.2 (L) 8.5 - 10.5 mg/dL    AST(SGOT) 17 12 - 45 U/L    ALT(SGPT) <5 2 - 50 U/L    Alkaline Phosphatase 59 30 - 99 U/L    Total Bilirubin 0.8 0.1 - 1.5 mg/dL    Albumin 3.4 3.2 - 4.9 g/dL    Total Protein 6.7 6.0 - 8.2 g/dL    Globulin 3.3 1.9 - 3.5 g/dL    A-G Ratio 1.0 g/dL   CBC WITHOUT DIFFERENTIAL    Collection Time: 04/02/25  1:14 AM   Result Value Ref Range    WBC 6.7 4.8 - 10.8 K/uL    RBC 3.04 (L) 4.70 - 6.10 M/uL    Hemoglobin 9.2 (L) 14.0 - 18.0 g/dL    Hematocrit 28.2 (L) 42.0 - 52.0 %     MCV 92.8 81.4 - 97.8 fL    MCH 30.3 27.0 - 33.0 pg    MCHC 32.6 32.3 - 36.5 g/dL    RDW 44.3 35.9 - 50.0 fL    Platelet Count 158 (L) 164 - 446 K/uL    MPV 10.6 9.0 - 12.9 fL   BLOOD CULTURE    Collection Time: 04/02/25  1:14 AM    Specimen: Peripheral; Blood   Result Value Ref Range    Significant Indicator NEG     Source BLD     Site PERIPHERAL     Culture Result No growth after 5 days of incubation.    BLOOD CULTURE    Collection Time: 04/02/25  1:14 AM    Specimen: Peripheral; Blood   Result Value Ref Range    Significant Indicator NEG     Source BLD     Site PERIPHERAL     Culture Result No growth after 5 days of incubation.    ESTIMATED GFR    Collection Time: 04/02/25  1:14 AM   Result Value Ref Range    GFR (CKD-EPI) 8 (A) >60 mL/min/1.73 m 2   POCT glucose device results    Collection Time: 04/02/25  8:51 AM   Result Value Ref Range    POC Glucose, Blood 87 65 - 99 mg/dL   POCT glucose device results    Collection Time: 04/02/25 12:55 PM   Result Value Ref Range    POC Glucose, Blood 109 (H) 65 - 99 mg/dL   Lipid Profile    Collection Time: 04/18/25 10:00 AM   Result Value Ref Range    Cholesterol,Tot 90 (L) 100 - 199 mg/dL    Triglycerides 71 0 - 149 mg/dL    HDL 42 >=40 mg/dL    LDL 34 <100 mg/dL   BUN    Collection Time: 04/25/25 10:00 AM   Result Value Ref Range    Bun 81 (H) 8 - 22 mg/dL   BUN    Collection Time: 04/25/25  1:40 PM   Result Value Ref Range    Bun 38 (H) 8 - 22 mg/dL       Reviewed MACKENZIE images      Assessment & Plan     1. Paroxysmal SVT (supraventricular tachycardia) (HCC) -on telemetry and during MACKENZIE atrial tachycardia likely        2. Moderate mitral regurgitation        3. Primary hypertension        4. Coronary artery disease due to lipid rich plaque - post PCI to RCA in 2019            Medical Decision Making: Today's Assessment/Status/Plan:          It was my pleasure to meet with Mr. Moore.    We addressed the management of hypertension at today's visit. Blood pressure is well  controlled.  We specifically assessed the labs on hypertension treatment    We addressed the management of dyslipidemia and atherosclerosis at today's visit. He is on appropriate lipid lowering medication.    We addressed the management of atherosclerotic artery disease.  He is on proper antiplatelet, cholesterol management as appropriate.  We addressed the potential side effects and laboratory follow-up for these medications.    I will see Mr. Moore back in 1 year time and encouraged him to follow up with us over the phone or electronically using my MyChart as issues arise.    It is my pleasure to participate in the care of Mr. Moore.  Please do not hesitate to contact me with questions or concerns.    Rl Garcia MD PhD [facility]  Cardiologist [facility]    Please note that this dictation was created using voice recognition software. There may be errors I did not discover before finalizing the note.     () Today's E/M visit is associated with medical care services that serve as the continuing focal point for all needed health care services and/or with medical care services that  are part of ongoing care related to a patient's single, serious condition, or a complex condition: This includes  furnishing services to patients on an ongoing basis that result in care that is personalized  to the patient. The services result in a comprehensive, longitudinal, and continuous  relationship with the patient and involve delivery of team-based care that is accessible, coordinated with other practitioners and providers, and integrated with the broader health  care landscape.

## 2025-04-28 NOTE — PATIENT INSTRUCTIONS
A - Antiplatelet - Clopidogrel (PLAVIX) reduces your risk of cardiac event by 27% compared to Aspirin 81 mg daily (HOST EXAM study 2021), prasrugrel (EFFIENT), ticagrelor (BRILINTA)) may be used for the first year.  Aspirin 81 mg daily is associated with a 20% less use of heart event and is used the first year after a cardiac event, stent or CABG.  B - Blood Pressure Control - reduces your risk or heart attack and stroke, the goal is <130/80.  C - Cholesterol Management - statins dramatically reduce your risk; for those that are intolerant to statins, there are alternatives.  D - Diet - MEDITERRANEAN DIET or Cardiac rehab diets, [email or web address].  E - Exercise - at least 2.5 hours of moderate exercise weekly  (typical brisk walking or similar activity).  F - Fats - VASCEPA, or EPA Fish oil (if Vascepa too expensive) for elevated triglycerides (REDUCE IT trial showed reduction from 22% 5 year MACE to 17%).  G - Good Vibes - meditation, exercise, yoga, Pilates, mindfulness, Jakub-Chi, stress reduction.  H - Heart Failure - betablockers, sucubatril (ENTRESTO) 16% less risk of dying over 3 years, spironolactone, empagliflozin (JARDIANCE) 17% less risk of dying over 2 years, CRT +/- ICD.  I - Inflammation - Colchicine in the LoDoCo2 study in 2020 reduced the risk of heart attack by 30% in 2.5 year follow up.  R - Rehab - Cardiac Rehab reduces risk of dying by 13-24% and need to go to the hospital by 30% within the first year. Compared to regular Cardiac Rehab, Intensive Cardiac Rehab ([place] [facility]) was shown to reduce the risk of major events 17% to 11% and hospitalization for CHF from 8% to 2%. (Nutrients [id number](11:2316)  S - Smoking - never smoke, if you do smoke ask for help to quit for good. Patients who quit smoking after heart attack have 36% less likely risk of dying.  Resources are 1-800-QUIT-NOW [place] in addition to Chantix, bupropion (Zyban) or nicotine replacement  T - Type II Diabetes  - pills empagliflozin (JARDIANCE) 38% less risk of dying over 4 years, and/or weekly injections: tirzepatide (Mounjaro), semaglutide (Ozempic), liraglutide (Victoza), dulaglutide (Trulicity) ~26% less risk of MACE in 2 years.  V - Vaccines - Annual flu shot and COVID vaccine reduces the risk of serious cardiovascular complications from these deadly infections.  W - Weight - maintain a healthy weight. Semaglutide (WEGOVY) weekly injection was shown to reduce weight by 10% and heart events by 20% for patients with CAD and BMI > 27 in the SELECT trial (6.5% vs 8% in 48 month follow up [facility] 12/2023).      Work on at least 2.5 - 5 hours a week of moderate exercise    Please look into the following diets and incorporate them into your diet  LOW SALT DIET   KEEP YOUR SODIUM EQUAL TO CALORIES AND NO MORE THAN DOUBLE THE CALORIES FOR A LOW SALT DIET    Cardiosmart.org - great resource for American College of Cardiology on heart disease prevention and treatment    FOR TREATMENT OR PREVENTION OF CORONARY ARTERY DISEASE  These three programs are approved by Medicare/Insurers for those with heart disease  [facility] - [facility]  Dr. Caldwell's Program for Reversing Heart Disease - Chaim Barillas's Cardiologist vegetarian-based  [facility] Cardiac Wellness Program - [place]-based mind-body Program    Mediterranean Diet has been shown to be a hearty healthy diet.    This is a commonly referenced Program  Dr Villa - No over Mary (book and documentary) - vegetarian-based    FOR TREATMENT OF BLOOD PRESSURE  DASH DIET - American Heart Association for treatment of HYPERTENSION    FOR TREATMENT OF BAD CHOLESTEROL/FATS  REDUCE PROCESSED SUGAR AS MUCH AS POSSIBLE  INCREASE WHOLE GRAINS/VEGETABLES  INCREASE FIBER    Lowering total cholesterol and LDL (bad) cholesterol:  - Eat leaner cuts of meat, or eliminate altogether if possible red meat, and frequently substitute fish or chicken.  - Limit saturated  fat to no more than 7-10% of total calories no more than 10 g per day is recommended. Some sources of saturated fat include butter, animal fats, hydrogenated vegetable fats and oils, many desserts, whole milk dairy products.  - Replaced saturated fats with polyunsaturated fats and monounsaturated fats. Foods high in monounsaturated fat include nuts, canola oil, avocados, and olives.  - Limit trans fat (processed foods) and replaced with fresh fruits and vegetables  - Recommend nonfat dairy products  - Increase substantially the amount of soluble fiber intake (legumes such as beans, fruit, whole grains).  - Consider nutritional supplements: plant sterile spreads such as Benecol, fish oil,  flaxseed oil, omega-3 acids EPA capsules 2000 mg twice a day, or viscous fiber such as Metamucil  - Attain ideal weight and regular exercise (at least 30 minutes per day of moderate exercise)  ASK ABOUT STATIN OR NON STATIN MEDICATION TO REDUCE YOUR LDL AND HEART RISK    Lowering triglycerides:  - Reduce intake of simple sugar: Desserts, candy, pastries, honey, sodas, sugared cereals, yogurt, Gatorade, sports bars, canned fruit, smoothies, fruit juice, coffee drinks  - Reduced intake of refined starches: Refined Pasta, most bread  - Reduce or abstain from alcohol  - Increase omega-3 fatty acids: [place], Trout, Mackerel, [place]ring, [place] tuna and supplements  - Attain ideal weight and regular exercise (at least 30 minutes per day of moderate exercise)  ASK ABOUT PURIFIED OMEGA 3 EPA or FISH OIL TO REDUCE YOUR TG AND HEART RISK    Elevating HDL (good) cholesterol:  - Increase physical activity  - Increase omega-3 fatty acids and supplements as listed above  - Incorporating appropriate amounts of monounsaturated fats such as nuts, olive oil, canola oil, avocados, olives  - Stop smoking  - Attain ideal weight and regular exercise (at least 30 minutes per day of moderate exercise)

## 2025-05-05 DIAGNOSIS — S88.112D BELOW-KNEE AMPUTATION OF LEFT LOWER EXTREMITY, SUBSEQUENT ENCOUNTER (HCC): ICD-10-CM

## 2025-05-09 NOTE — Clinical Note
REFERRAL APPROVAL NOTICE         Sent on May 9, 2025                   Beni Moore  [address] [place] [address]                   Dear Mr. Moore,    After a careful review of the medical information and benefit coverage, [facility] has processed your referral. See below for additional details.    If applicable, you must be actively enrolled with your insurance for coverage of the authorized service. If you have any questions regarding your coverage, please contact your insurance directly.    REFERRAL INFORMATION   Referral #:  [id number]  Referred-To Provider    Referred-By Provider:  Orthotics    Florinda Pascual M.D.   [facility]      [address]  [facility] [address]  [phone] [id number]  [place] [address]  [phone]    Referral Start Date:  05/05/2025  Referral End Date:   05/05/2026             SCHEDULING  If you do not already have an appointment, please call [phone] to make an appointment.     MORE INFORMATION  If you do not already have a [employer] account, sign up at: [email or web address]  You can access your medical information, make appointments, see lab results, billing information, and more.  If you have questions regarding this referral, please contact  the [facility] Referrals department at:             [phone]. Monday - Friday 8:00AM - 5:00PM.     Sincerely,    [facility]

## 2025-05-13 ENCOUNTER — HOSPITAL ENCOUNTER (OUTPATIENT)
Dept: RADIOLOGY | Facility: MEDICAL CENTER | Age: 55
End: 2025-05-13
Attending: FAMILY MEDICINE
Payer: MEDICARE

## 2025-05-13 ENCOUNTER — OFFICE VISIT (OUTPATIENT)
Dept: MEDICAL GROUP | Facility: PHYSICIAN GROUP | Age: 55
End: 2025-05-13
Payer: MEDICARE

## 2025-05-13 VITALS
DIASTOLIC BLOOD PRESSURE: 78 MMHG | HEIGHT: 72 IN | SYSTOLIC BLOOD PRESSURE: 146 MMHG | OXYGEN SATURATION: 96 % | RESPIRATION RATE: 18 BRPM | BODY MASS INDEX: 30.46 KG/M2 | WEIGHT: 224.87 LBS | TEMPERATURE: 97.8 F | HEART RATE: 79 BPM

## 2025-05-13 DIAGNOSIS — R06.02 SHORTNESS OF BREATH: ICD-10-CM

## 2025-05-13 PROCEDURE — 71046 X-RAY EXAM CHEST 2 VIEWS: CPT

## 2025-05-13 PROCEDURE — 99213 OFFICE O/P EST LOW 20 MIN: CPT | Performed by: FAMILY MEDICINE

## 2025-05-13 PROCEDURE — 3078F DIAST BP <80 MM HG: CPT | Performed by: FAMILY MEDICINE

## 2025-05-13 PROCEDURE — 3077F SYST BP >= 140 MM HG: CPT | Performed by: FAMILY MEDICINE

## 2025-05-13 ASSESSMENT — FIBROSIS 4 INDEX: FIB4 SCORE: 2.84

## 2025-05-13 NOTE — PROGRESS NOTES
Subjective:     CC:   Chief Complaint   Patient presents with    Follow-Up       HPI:   Beni presents today due to the fact that he is requesting possibility of getting oxygen.  Patient states that the last 2 weeks he sometimes gets short of breath when he is laying down.  Patient does see cardiology also he does get dialysis done.  Patient denies any coughing or chest discomfort.  Patient states he took his oxygen sat with his dad's oximeter and it was about 84.  Reviewing notes patient's oxygen sats have been looking great recently today's is 96.    Past Medical History:   Diagnosis Date    Anesthesia     Hypotension post-op, persisted for a few months x 1    Anesthesia 12/06/2023    Patient stated after 2016 colonoscopy he became hypotensive.    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    Heart burn     GERD; takes Prilosec    Hemorrhagic disorder ([facility])     ASA protection for stent and cardiac history    High cholesterol     medicated x 2    History of non-ST elevation myocardial infarction (NSTEMI)     Per Problem List    Hx of heart artery stent 2019    Hyperlipidemia     Hyperparathyroidism due to renal insufficiency ([facility])     Per Problem List    Hypertension     medicated    Moderate mitral regurgitation 04/01/2025    Myocardial infarct ([facility]) 2019    FOLLOWED BY  TO    Paroxysmal SVT (supraventricular tachycardia) ([facility]) -on telemetry and during MACKENZIE atrial tachycardia likely 04/01/2025    Pneumonia     H/O    Renal disorder 12/06/2023    Stage 3 CKD; 12/9/2024 dialysis at [facility] on Wednesday's only via CVC    Shortness of breath 5/13/2025       Social History     Tobacco Use     Smoking status: Former     Current packs/day: 0.00     Average packs/day: 0.6 packs/day for 17.0 years (10.0 ttl pk-yrs)     Types: Cigarettes     Start date: 1988     Quit date:      Years since quittin.3     Passive exposure: Never    Smokeless tobacco: Never   Vaping Use    Vaping status: Never Used   Substance Use Topics    Alcohol use: Not Currently     Comment: Quit     Drug use: Not Currently     Types: Oral     Comment: NOT CURRENTLY, HISTORY OF CBD       Current Outpatient Medications Ordered in Epic   Medication Sig Dispense Refill    insulin NPH (HUMULIN/NOVOLIN N) 100 UNIT/ML Suspension Inject  under the skin.      atorvastatin (LIPITOR) 40 MG Tab Take 1 Tablet by mouth every evening. 90 Tablet 3    amLODIPine (NORVASC) 2.5 MG Tab Take 3 Tablets by mouth every day. 100 Tablet 3    acetaminophen (TYLENOL) 500 MG Tab Take 2 Tablets by mouth every 6 hours as needed for Moderate Pain. 30 Tablet 0    insulin glargine (LANTUS SOLOSTAR) 100 UNIT/ML Solution Pen-injector injection Inject 24 Units under the skin every evening. 9 mL 0    [Paused] Methoxy PEG-Epoetin Beta (MIRCERA) 75 MCG/0.3ML Solution Prefilled Syringe Inject 75 mcg as directed every 14 days. [facility] [phone]      cholestyramine (QUESTRAN) 4 g packet Take 4 g by mouth 2 times a day for 360 days. 60 Each 11    torsemide (DEMADEX) 100 MG Tab Take 1 Tablet by mouth every day. 90 Tablet 3    losartan (COZAAR) 100 MG Tab Take 1 Tablet by mouth every day. 90 Tablet 3    metOLazone (ZAROXOLYN) 5 MG Tab Take 1 Tablet by mouth every day. 90 Tablet 3    aspirin 81 MG EC tablet Take 1 Tablet by mouth every day. 30 Tablet 2    omeprazole (PRILOSEC) 20 MG delayed-release capsule Take 20 mg by mouth every morning.          No current Epic-ordered facility-administered medications on file.       Allergies:  Patient has no known allergies.    Health Maintenance: Completed    ROS:  Gen: no fevers/chills, no changes in  weight  Eyes: no changes in vision  ENT: no sore throat, no hearing loss, no bloody nose  Pulm: no sob, no cough  CV: no chest pain, no palpitations  GI: no nausea/vomiting, no diarrhea  : no dysuria  MSk: no myalgias  Skin: no rash  Neuro: no headaches, no numbness/tingling  Heme/Lymph: no easy bruising    Objective:     Exam:  BP (!) 146/78 (BP Location: Right arm, Patient Position: Sitting, BP Cuff Size: Adult)   Pulse 79   Temp 36.6 °C (97.8 °F)   Resp 18   Ht 1.829 m (6')   Wt 102 kg (224 lb 13.9 oz) Comment: pt states  SpO2 96%   BMI 30.50 kg/m²  Body mass index is 30.5 kg/m².    Gen: Alert and oriented, No apparent distress.  Skin: Warm and dry.  No obvious lesions.  Eyes: Sclera wnl Pupils normal in size  Lungs: Normal effort, may have slight decreased breath sounds noted on the right lower lung base  CV: Regular rate and rhythm. No murmurs, rubs, or gallops.  Musculoskeletal: Normal gait. No extremity cyanosis, clubbing, or edema.  Neuro: Oriented to person, place and time  Psych: Mood is wnl       Assessment & Plan:     54 y.o. male with the following -     1. Shortness of breath  Patient's exam today looks within normal limits except for slight decreased breath sounds on the right lower base we will go ahead and get a chest x-ray done.  Will contact patient on result.  If it is within normal limits I would like patient have discussion with cardiology this may be more fluid overload patient states he will do that once he gets his x-ray results.  - DX-CHEST-2 VIEWS; Future       Return if symptoms worsen or fail to improve.    Please note that this dictation was created using voice recognition software. I have made every reasonable attempt to correct obvious errors, but I expect that there are errors of grammar and possibly content that I did not discover before finalizing the note.

## 2025-05-15 ENCOUNTER — APPOINTMENT (OUTPATIENT)
Dept: URGENT CARE | Facility: PHYSICIAN GROUP | Age: 55
End: 2025-05-15
Payer: MEDICARE

## 2025-05-15 RX ORDER — AZITHROMYCIN 250 MG/1
TABLET, FILM COATED ORAL
Qty: 6 TABLET | Refills: 0 | Status: SHIPPED | OUTPATIENT
Start: 2025-05-15

## 2025-06-01 PROCEDURE — 90935 HEMODIALYSIS ONE EVALUATION: CPT

## 2025-06-18 ENCOUNTER — HOSPITAL ENCOUNTER (INPATIENT)
Facility: MEDICAL CENTER | Age: 55
LOS: 6 days | DRG: 871 | End: 2025-06-24
Attending: EMERGENCY MEDICINE | Admitting: STUDENT IN AN ORGANIZED HEALTH CARE EDUCATION/TRAINING PROGRAM
Payer: MEDICARE

## 2025-06-18 ENCOUNTER — APPOINTMENT (OUTPATIENT)
Dept: RADIOLOGY | Facility: MEDICAL CENTER | Age: 55
DRG: 871 | End: 2025-06-18
Attending: EMERGENCY MEDICINE
Payer: MEDICARE

## 2025-06-18 DIAGNOSIS — L03.115 CELLULITIS OF RIGHT LOWER EXTREMITY: Primary | ICD-10-CM

## 2025-06-18 DIAGNOSIS — B95.61 MSSA BACTEREMIA: ICD-10-CM

## 2025-06-18 DIAGNOSIS — R78.81 MSSA BACTEREMIA: ICD-10-CM

## 2025-06-18 PROBLEM — L03.90 CELLULITIS: Status: ACTIVE | Noted: 2025-06-18

## 2025-06-18 PROBLEM — R94.31 ABNORMAL EKG: Status: ACTIVE | Noted: 2025-06-18

## 2025-06-18 PROBLEM — I16.1 HYPERTENSIVE EMERGENCY: Status: ACTIVE | Noted: 2025-06-18

## 2025-06-18 PROBLEM — J18.9 PNEUMONIA DUE TO INFECTIOUS ORGANISM: Status: ACTIVE | Noted: 2025-06-18

## 2025-06-18 LAB
ALBUMIN SERPL BCP-MCNC: 4.2 G/DL (ref 3.2–4.9)
ALBUMIN/GLOB SERPL: 1.1 G/DL
ALP SERPL-CCNC: 77 U/L (ref 30–99)
ALT SERPL-CCNC: 9 U/L (ref 2–50)
ANION GAP SERPL CALC-SCNC: 20 MMOL/L (ref 7–16)
AST SERPL-CCNC: 17 U/L (ref 12–45)
BASOPHILS # BLD AUTO: 0.3 % (ref 0–1.8)
BASOPHILS # BLD: 0.02 K/UL (ref 0–0.12)
BILIRUB SERPL-MCNC: 1.2 MG/DL (ref 0.1–1.5)
BUN SERPL-MCNC: 59 MG/DL (ref 8–22)
CALCIUM ALBUM COR SERPL-MCNC: 8.7 MG/DL (ref 8.5–10.5)
CALCIUM SERPL-MCNC: 8.9 MG/DL (ref 8.5–10.5)
CHLORIDE SERPL-SCNC: 96 MMOL/L (ref 96–112)
CO2 SERPL-SCNC: 21 MMOL/L (ref 20–33)
CREAT SERPL-MCNC: 7.29 MG/DL (ref 0.5–1.4)
EKG IMPRESSION: NORMAL
EOSINOPHIL # BLD AUTO: 0.05 K/UL (ref 0–0.51)
EOSINOPHIL NFR BLD: 0.7 % (ref 0–6.9)
ERYTHROCYTE [DISTWIDTH] IN BLOOD BY AUTOMATED COUNT: 44.8 FL (ref 35.9–50)
FLUAV RNA SPEC QL NAA+PROBE: NEGATIVE
FLUBV RNA SPEC QL NAA+PROBE: NEGATIVE
GFR SERPLBLD CREATININE-BSD FMLA CKD-EPI: 8 ML/MIN/1.73 M 2
GLOBULIN SER CALC-MCNC: 3.7 G/DL (ref 1.9–3.5)
GLUCOSE BLD STRIP.AUTO-MCNC: 118 MG/DL (ref 65–99)
GLUCOSE BLD STRIP.AUTO-MCNC: 189 MG/DL (ref 65–99)
GLUCOSE SERPL-MCNC: 149 MG/DL (ref 65–99)
GRAM STN SPEC: NORMAL
HBV CORE AB SERPL QL IA: NONREACTIVE
HBV SURFACE AB SERPL IA-ACNC: <3.5 MIU/ML (ref 0–10)
HBV SURFACE AG SER QL: NORMAL
HCT VFR BLD AUTO: 31.6 % (ref 42–52)
HGB BLD-MCNC: 10.6 G/DL (ref 14–18)
IMM GRANULOCYTES # BLD AUTO: 0.05 K/UL (ref 0–0.11)
IMM GRANULOCYTES NFR BLD AUTO: 0.7 % (ref 0–0.9)
INR PPP: 1.27 (ref 0.87–1.13)
LACTATE SERPL-SCNC: 1.3 MMOL/L (ref 0.5–2)
LACTATE SERPL-SCNC: 2.2 MMOL/L (ref 0.5–2)
LYMPHOCYTES # BLD AUTO: 0.58 K/UL (ref 1–4.8)
LYMPHOCYTES NFR BLD: 8.6 % (ref 22–41)
MAGNESIUM SERPL-MCNC: 2 MG/DL (ref 1.5–2.5)
MCH RBC QN AUTO: 29.3 PG (ref 27–33)
MCHC RBC AUTO-ENTMCNC: 33.5 G/DL (ref 32.3–36.5)
MCV RBC AUTO: 87.3 FL (ref 81.4–97.8)
MONOCYTES # BLD AUTO: 0.38 K/UL (ref 0–0.85)
MONOCYTES NFR BLD AUTO: 5.7 % (ref 0–13.4)
NEUTROPHILS # BLD AUTO: 5.64 K/UL (ref 1.82–7.42)
NEUTROPHILS NFR BLD: 84 % (ref 44–72)
NRBC # BLD AUTO: 0 K/UL
NRBC BLD-RTO: 0 /100 WBC (ref 0–0.2)
PLATELET # BLD AUTO: 120 K/UL (ref 164–446)
PMV BLD AUTO: 10 FL (ref 9–12.9)
POTASSIUM SERPL-SCNC: 3.4 MMOL/L (ref 3.6–5.5)
PROCALCITONIN SERPL-MCNC: 0.39 NG/ML
PROT SERPL-MCNC: 7.9 G/DL (ref 6–8.2)
PROTHROMBIN TIME: 15.9 SEC (ref 12–14.6)
RBC # BLD AUTO: 3.62 M/UL (ref 4.7–6.1)
RSV RNA SPEC QL NAA+PROBE: NEGATIVE
SARS-COV-2 RNA RESP QL NAA+PROBE: NOTDETECTED
SCCMEC + MECA PNL NOSE NAA+PROBE: NEGATIVE
SIGNIFICANT IND 70042: NORMAL
SITE SITE: NORMAL
SODIUM SERPL-SCNC: 137 MMOL/L (ref 135–145)
SOURCE SOURCE: NORMAL
SPECIMEN SOURCE: NORMAL
WBC # BLD AUTO: 6.7 K/UL (ref 4.8–10.8)

## 2025-06-18 PROCEDURE — 80053 COMPREHEN METABOLIC PANEL: CPT

## 2025-06-18 PROCEDURE — 99223 1ST HOSP IP/OBS HIGH 75: CPT | Mod: AI | Performed by: STUDENT IN AN ORGANIZED HEALTH CARE EDUCATION/TRAINING PROGRAM

## 2025-06-18 PROCEDURE — 700111 HCHG RX REV CODE 636 W/ 250 OVERRIDE (IP): Performed by: STUDENT IN AN ORGANIZED HEALTH CARE EDUCATION/TRAINING PROGRAM

## 2025-06-18 PROCEDURE — 0241U HCHG SARS-COV-2 COVID-19 NFCT DS RESP RNA 4 TRGT MIC: CPT

## 2025-06-18 PROCEDURE — 87070 CULTURE OTHR SPECIMN AEROBIC: CPT

## 2025-06-18 PROCEDURE — 700102 HCHG RX REV CODE 250 W/ 637 OVERRIDE(OP): Performed by: EMERGENCY MEDICINE

## 2025-06-18 PROCEDURE — 86706 HEP B SURFACE ANTIBODY: CPT

## 2025-06-18 PROCEDURE — 87147 CULTURE TYPE IMMUNOLOGIC: CPT

## 2025-06-18 PROCEDURE — 83605 ASSAY OF LACTIC ACID: CPT | Mod: 91

## 2025-06-18 PROCEDURE — 86704 HEP B CORE ANTIBODY TOTAL: CPT

## 2025-06-18 PROCEDURE — 99285 EMERGENCY DEPT VISIT HI MDM: CPT

## 2025-06-18 PROCEDURE — 93005 ELECTROCARDIOGRAM TRACING: CPT | Mod: TC

## 2025-06-18 PROCEDURE — 83735 ASSAY OF MAGNESIUM: CPT

## 2025-06-18 PROCEDURE — A9270 NON-COVERED ITEM OR SERVICE: HCPCS | Performed by: EMERGENCY MEDICINE

## 2025-06-18 PROCEDURE — 71045 X-RAY EXAM CHEST 1 VIEW: CPT

## 2025-06-18 PROCEDURE — 700111 HCHG RX REV CODE 636 W/ 250 OVERRIDE (IP): Mod: JZ | Performed by: EMERGENCY MEDICINE

## 2025-06-18 PROCEDURE — 87040 BLOOD CULTURE FOR BACTERIA: CPT

## 2025-06-18 PROCEDURE — 84145 PROCALCITONIN (PCT): CPT

## 2025-06-18 PROCEDURE — 36415 COLL VENOUS BLD VENIPUNCTURE: CPT

## 2025-06-18 PROCEDURE — 99222 1ST HOSP IP/OBS MODERATE 55: CPT | Performed by: INTERNAL MEDICINE

## 2025-06-18 PROCEDURE — A9270 NON-COVERED ITEM OR SERVICE: HCPCS | Performed by: STUDENT IN AN ORGANIZED HEALTH CARE EDUCATION/TRAINING PROGRAM

## 2025-06-18 PROCEDURE — 700102 HCHG RX REV CODE 250 W/ 637 OVERRIDE(OP): Performed by: STUDENT IN AN ORGANIZED HEALTH CARE EDUCATION/TRAINING PROGRAM

## 2025-06-18 PROCEDURE — 96375 TX/PRO/DX INJ NEW DRUG ADDON: CPT

## 2025-06-18 PROCEDURE — 87186 SC STD MICRODIL/AGAR DIL: CPT

## 2025-06-18 PROCEDURE — 87077 CULTURE AEROBIC IDENTIFY: CPT

## 2025-06-18 PROCEDURE — 87641 MR-STAPH DNA AMP PROBE: CPT

## 2025-06-18 PROCEDURE — 82962 GLUCOSE BLOOD TEST: CPT | Performed by: STUDENT IN AN ORGANIZED HEALTH CARE EDUCATION/TRAINING PROGRAM

## 2025-06-18 PROCEDURE — 85610 PROTHROMBIN TIME: CPT

## 2025-06-18 PROCEDURE — 87205 SMEAR GRAM STAIN: CPT

## 2025-06-18 PROCEDURE — 770020 HCHG ROOM/CARE - TELE (206)

## 2025-06-18 PROCEDURE — 87340 HEPATITIS B SURFACE AG IA: CPT

## 2025-06-18 PROCEDURE — 85025 COMPLETE CBC W/AUTO DIFF WBC: CPT

## 2025-06-18 PROCEDURE — 87150 DNA/RNA AMPLIFIED PROBE: CPT

## 2025-06-18 PROCEDURE — 93005 ELECTROCARDIOGRAM TRACING: CPT | Mod: TC | Performed by: EMERGENCY MEDICINE

## 2025-06-18 PROCEDURE — 700105 HCHG RX REV CODE 258: Performed by: EMERGENCY MEDICINE

## 2025-06-18 PROCEDURE — 96365 THER/PROPH/DIAG IV INF INIT: CPT

## 2025-06-18 RX ORDER — LABETALOL HYDROCHLORIDE 5 MG/ML
10 INJECTION, SOLUTION INTRAVENOUS EVERY 6 HOURS PRN
Status: DISCONTINUED | OUTPATIENT
Start: 2025-06-18 | End: 2025-06-24 | Stop reason: HOSPADM

## 2025-06-18 RX ORDER — OXYCODONE HYDROCHLORIDE 10 MG/1
10 TABLET ORAL
Refills: 0 | Status: DISCONTINUED | OUTPATIENT
Start: 2025-06-18 | End: 2025-06-24 | Stop reason: HOSPADM

## 2025-06-18 RX ORDER — ACETAMINOPHEN 325 MG/1
650 TABLET ORAL EVERY 6 HOURS
Status: COMPLETED | OUTPATIENT
Start: 2025-06-18 | End: 2025-06-23

## 2025-06-18 RX ORDER — DEXTROSE MONOHYDRATE 25 G/50ML
25 INJECTION, SOLUTION INTRAVENOUS
Status: DISCONTINUED | OUTPATIENT
Start: 2025-06-18 | End: 2025-06-24 | Stop reason: HOSPADM

## 2025-06-18 RX ORDER — LINEZOLID 600 MG/1
600 TABLET, FILM COATED ORAL EVERY 12 HOURS
Status: DISCONTINUED | OUTPATIENT
Start: 2025-06-18 | End: 2025-06-19

## 2025-06-18 RX ORDER — AZITHROMYCIN MONOHYDRATE 500 MG/5ML
500 INJECTION, POWDER, LYOPHILIZED, FOR SOLUTION INTRAVENOUS ONCE
Status: COMPLETED | OUTPATIENT
Start: 2025-06-18 | End: 2025-06-18

## 2025-06-18 RX ORDER — OMEPRAZOLE 20 MG/1
20 CAPSULE, DELAYED RELEASE ORAL EVERY MORNING
Status: DISCONTINUED | OUTPATIENT
Start: 2025-06-19 | End: 2025-06-24 | Stop reason: HOSPADM

## 2025-06-18 RX ORDER — SODIUM CHLORIDE, SODIUM LACTATE, POTASSIUM CHLORIDE, AND CALCIUM CHLORIDE .6; .31; .03; .02 G/100ML; G/100ML; G/100ML; G/100ML
1000 INJECTION, SOLUTION INTRAVENOUS ONCE
Status: COMPLETED | OUTPATIENT
Start: 2025-06-18 | End: 2025-06-18

## 2025-06-18 RX ORDER — ASPIRIN 81 MG/1
81 TABLET ORAL DAILY
Status: DISCONTINUED | OUTPATIENT
Start: 2025-06-19 | End: 2025-06-24 | Stop reason: HOSPADM

## 2025-06-18 RX ORDER — METOLAZONE 5 MG/1
5 TABLET ORAL DAILY
Status: DISCONTINUED | OUTPATIENT
Start: 2025-06-19 | End: 2025-06-24 | Stop reason: HOSPADM

## 2025-06-18 RX ORDER — SODIUM CHLORIDE 9 MG/ML
100 INJECTION, SOLUTION INTRAVENOUS
Status: DISCONTINUED | OUTPATIENT
Start: 2025-06-18 | End: 2025-06-24 | Stop reason: HOSPADM

## 2025-06-18 RX ORDER — ATORVASTATIN CALCIUM 80 MG/1
40 TABLET, FILM COATED ORAL NIGHTLY
Status: ON HOLD | COMMUNITY
End: 2025-06-24

## 2025-06-18 RX ORDER — INSULIN DEGLUDEC 100 U/ML
20 INJECTION, SOLUTION SUBCUTANEOUS NIGHTLY
COMMUNITY

## 2025-06-18 RX ORDER — METHOXY POLYETHYLENE GLYCOL-EPOETIN BETA 150 UG/.3ML
150 INJECTION, SOLUTION INTRAVENOUS
COMMUNITY

## 2025-06-18 RX ORDER — TORSEMIDE 100 MG/1
100 TABLET ORAL DAILY
Status: DISCONTINUED | OUTPATIENT
Start: 2025-06-19 | End: 2025-06-24 | Stop reason: HOSPADM

## 2025-06-18 RX ORDER — AZITHROMYCIN 500 MG/5ML
500 INJECTION, POWDER, LYOPHILIZED, FOR SOLUTION INTRAVENOUS EVERY 24 HOURS
Status: DISCONTINUED | OUTPATIENT
Start: 2025-06-19 | End: 2025-06-19

## 2025-06-18 RX ORDER — OMEGA-3 FATTY ACIDS/FISH OIL 300-1000MG
800 CAPSULE ORAL
Status: ON HOLD | COMMUNITY
End: 2025-06-24

## 2025-06-18 RX ORDER — HYDROMORPHONE HYDROCHLORIDE 1 MG/ML
1 INJECTION, SOLUTION INTRAMUSCULAR; INTRAVENOUS; SUBCUTANEOUS ONCE
Status: COMPLETED | OUTPATIENT
Start: 2025-06-18 | End: 2025-06-18

## 2025-06-18 RX ORDER — HYDROMORPHONE HYDROCHLORIDE 1 MG/ML
0.5 INJECTION, SOLUTION INTRAMUSCULAR; INTRAVENOUS; SUBCUTANEOUS
Status: DISCONTINUED | OUTPATIENT
Start: 2025-06-18 | End: 2025-06-24 | Stop reason: HOSPADM

## 2025-06-18 RX ORDER — ACETAMINOPHEN 325 MG/1
650 TABLET ORAL EVERY 6 HOURS PRN
Status: DISCONTINUED | OUTPATIENT
Start: 2025-06-23 | End: 2025-06-24 | Stop reason: HOSPADM

## 2025-06-18 RX ORDER — AMLODIPINE BESYLATE 10 MG/1
10 TABLET ORAL DAILY
Status: DISCONTINUED | OUTPATIENT
Start: 2025-06-19 | End: 2025-06-24 | Stop reason: HOSPADM

## 2025-06-18 RX ORDER — INSULIN LISPRO 100 [IU]/ML
1-6 INJECTION, SOLUTION INTRAVENOUS; SUBCUTANEOUS
Status: DISCONTINUED | OUTPATIENT
Start: 2025-06-18 | End: 2025-06-24 | Stop reason: HOSPADM

## 2025-06-18 RX ORDER — HEPARIN SODIUM 5000 [USP'U]/ML
5000 INJECTION, SOLUTION INTRAVENOUS; SUBCUTANEOUS EVERY 12 HOURS
Status: DISCONTINUED | OUTPATIENT
Start: 2025-06-18 | End: 2025-06-24 | Stop reason: HOSPADM

## 2025-06-18 RX ORDER — LOSARTAN POTASSIUM 50 MG/1
100 TABLET ORAL DAILY
Status: DISCONTINUED | OUTPATIENT
Start: 2025-06-19 | End: 2025-06-24 | Stop reason: HOSPADM

## 2025-06-18 RX ORDER — LIDOCAINE AND PRILOCAINE 25; 25 MG/G; MG/G
CREAM TOPICAL
Status: DISCONTINUED | OUTPATIENT
Start: 2025-06-18 | End: 2025-06-24 | Stop reason: HOSPADM

## 2025-06-18 RX ORDER — CEFAZOLIN 2 G/1
2 INJECTION, POWDER, FOR SOLUTION INTRAMUSCULAR; INTRAVENOUS ONCE
Status: COMPLETED | OUTPATIENT
Start: 2025-06-18 | End: 2025-06-18

## 2025-06-18 RX ORDER — OXYCODONE HYDROCHLORIDE 5 MG/1
5 TABLET ORAL
Refills: 0 | Status: DISCONTINUED | OUTPATIENT
Start: 2025-06-18 | End: 2025-06-24 | Stop reason: HOSPADM

## 2025-06-18 RX ORDER — IBUPROFEN 600 MG/1
600 TABLET, FILM COATED ORAL ONCE
Status: COMPLETED | OUTPATIENT
Start: 2025-06-18 | End: 2025-06-18

## 2025-06-18 RX ORDER — ATORVASTATIN CALCIUM 40 MG/1
40 TABLET, FILM COATED ORAL EVERY EVENING
Status: DISCONTINUED | OUTPATIENT
Start: 2025-06-18 | End: 2025-06-19

## 2025-06-18 RX ADMIN — CEFAZOLIN 2 G: 2 INJECTION, POWDER, FOR SOLUTION INTRAVENOUS at 12:31

## 2025-06-18 RX ADMIN — AMPICILLIN AND SULBACTAM 3 G: 1; 2 INJECTION, POWDER, FOR SOLUTION INTRAMUSCULAR; INTRAVENOUS at 13:18

## 2025-06-18 RX ADMIN — ACETAMINOPHEN 650 MG: 325 TABLET ORAL at 18:11

## 2025-06-18 RX ADMIN — LABETALOL HYDROCHLORIDE 10 MG: 5 INJECTION INTRAVENOUS at 13:59

## 2025-06-18 RX ADMIN — HEPARIN SODIUM 5000 UNITS: 5000 INJECTION, SOLUTION INTRAVENOUS; SUBCUTANEOUS at 18:11

## 2025-06-18 RX ADMIN — INSULIN GLARGINE-YFGN 10 UNITS: 100 INJECTION, SOLUTION SUBCUTANEOUS at 18:21

## 2025-06-18 RX ADMIN — AZITHROMYCIN 500 MG: 500 INJECTION, POWDER, LYOPHILIZED, FOR SOLUTION INTRAVENOUS at 14:05

## 2025-06-18 RX ADMIN — LINEZOLID 600 MG: 600 TABLET, FILM COATED ORAL at 15:08

## 2025-06-18 RX ADMIN — IBUPROFEN 600 MG: 600 TABLET ORAL at 12:45

## 2025-06-18 RX ADMIN — SODIUM CHLORIDE, POTASSIUM CHLORIDE, SODIUM LACTATE AND CALCIUM CHLORIDE 1000 ML: 600; 310; 30; 20 INJECTION, SOLUTION INTRAVENOUS at 12:35

## 2025-06-18 RX ADMIN — INSULIN LISPRO 1 UNITS: 100 INJECTION, SOLUTION INTRAVENOUS; SUBCUTANEOUS at 20:55

## 2025-06-18 RX ADMIN — ACETAMINOPHEN 650 MG: 325 TABLET ORAL at 14:54

## 2025-06-18 RX ADMIN — HYDROMORPHONE HYDROCHLORIDE 1 MG: 1 INJECTION, SOLUTION INTRAMUSCULAR; INTRAVENOUS; SUBCUTANEOUS at 13:15

## 2025-06-18 RX ADMIN — ATORVASTATIN CALCIUM 40 MG: 40 TABLET, FILM COATED ORAL at 18:11

## 2025-06-18 ASSESSMENT — ENCOUNTER SYMPTOMS
CHILLS: 1
VOMITING: 0
COUGH: 1
HEADACHES: 0
DIZZINESS: 0
FEVER: 1
SHORTNESS OF BREATH: 0
ABDOMINAL PAIN: 0
NAUSEA: 0

## 2025-06-18 ASSESSMENT — FIBROSIS 4 INDEX: FIB4 SCORE: 3.74

## 2025-06-18 NOTE — ED TRIAGE NOTES
Chief Complaint   Patient presents with    Fever     BIB [facility] from dialysis after patient had fatigue and shivering during session today. He got most of his session. He has wound to right lower extremity, PMH DM with left aka. He was given 650mg tylenol for fever PTA. They placed him on 3L NC for comfort, but sating well on RA on arrival

## 2025-06-18 NOTE — ED NOTES
Break RN: MRSA swab collected, pt medicated for fever  
Med Rec complete per patient   Allergies reviewed  Antibiotics in the past 30 days:yes  Anticoagulant in past 14 days:no  Pharmacy patient utilizes:[facility] in [place]    
Patient transported up to floor with chart and belongings on oxygen  
symmetrical

## 2025-06-18 NOTE — CONSULTS
Nephrology Consultation    Date of Service  6/18/2025    Referring Physician  Nolan Yanes M.D.    Consulting Physician  Varun Ortiz M.D.    Reason for Consultation  Management of ESRD on Home HD      History of Presenting Illness  54 y.o. male with a history of hypertension, diabetes complicated by left BKA, ESRD on hemodialysis since March 2024, currently in week 3 of training for home hemodialysis who presented 6/18/2025 with fever.    This patient is well known to me from outpatient dialysis clinic.  He was previously dialyzing 3 times per week at the [facility].  However, he struggled with high intradialytic weight gains between dialysis treatments, especially over the weekends.  He was started on home hemodialysis training 6/2/2025, and has been doing well with home hemodialysis training.  He cannulates his own fistula with buttonhole needles.    Today, while undergoing home hemodialysis training and treatment, he started experiencing fever near the end of his treatment.  He only had 10 minutes remaining when he became very uncomfortable with fevers and chills, and treatment was stopped.  EMS was called and patient was transported to the hospital.  The patient's predialysis temperature was 99.5 °F and his post dialysis treatment temperature was 100.0 °F.    Patient tells me he felt unwell even prior to coming to dialysis today.  He has been complaining of dry cough.  During his dialysis treatment, he felt like he was shaking so hard that he had to come off of dialysis 10 minutes prior to the planned completion of treatment.    Review of Systems  Review of Systems   Constitutional:  Positive for chills and fever.   Respiratory:  Positive for cough. Negative for shortness of breath.    Cardiovascular:  Negative for chest pain.   Gastrointestinal:  Negative for abdominal pain.   All other systems reviewed and are negative.      Past Medical History  Past Medical History:   Diagnosis Date     Anesthesia     Hypotension post-op, persisted for a few months x 1    Anesthesia 12/06/2023    Patient stated after 2016 colonoscopy he became hypotensive.    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    Heart burn     GERD; takes Prilosec    Hemorrhagic disorder ([facility])     ASA protection for stent and cardiac history    High cholesterol     medicated x 2    History of non-ST elevation myocardial infarction (NSTEMI)     Per Problem List    Hx of heart artery stent 2019    Hyperlipidemia     Hyperparathyroidism due to renal insufficiency ([facility])     Per Problem List    Hypertension     medicated    Moderate mitral regurgitation 04/01/2025    Myocardial infarct ([facility]) 2019    FOLLOWED BY DR TO    Paroxysmal SVT (supraventricular tachycardia) ([facility]) -on telemetry and during MACK[facility]ZIE atrial tachycardia likely 04/01/2025    Pneumonia     H/O    Renal disorder 12/06/2023    Stage 3 CKD; 12/9/2024 dialysis at [facility] on Wednesday's only via CVC    Shortness of breath 5/13/2025       Surgical History  Past Surgical History[1]    Family History  Family History   Problem Relation Age of Onset    No Known Problems Mother     Diabetes Father         Adult onset    Heart Disease Father     Hypertension Father     Diabetes Maternal Grandmother     Heart Disease Maternal Grandfather     Lung Disease Paternal Grandmother     Cancer Paternal Grandmother     Cancer Paternal Grandfather     Lung Cancer Paternal Grandfather     Kidney Disease Neg Hx        Social History  Social History     Socioeconomic History    Marital status:      Spouse name: Not on file    Number of children: Not on file    Years of  education: Not on file    Highest education level: Some college, no degree   Occupational History    Not on file   Tobacco Use    Smoking status: Former     Current packs/day: 0.00     Average packs/day: 0.6 packs/day for 17.0 years (10.0 ttl pk-yrs)     Types: Cigarettes     Start date: 1988     Quit date:      Years since quittin.4     Passive exposure: Never    Smokeless tobacco: Never   Vaping Use    Vaping status: Never Used   Substance and Sexual Activity    Alcohol use: Not Currently     Comment: Quit     Drug use: Not Currently     Types: Oral     Comment: NOT CURRENTLY, HISTORY OF CBD    Sexual activity: Not Currently     Partners: Female   Other Topics Concern    Not on file   Social History Narrative    Not on file     Social Drivers of Health     Financial Resource Strain: Low Risk  (2022)    Overall Financial Resource Strain (CARDIA)     Difficulty of Paying Living Expenses: Not hard at all   Food Insecurity: No Food Insecurity (3/28/2025)    Hunger Vital Sign     Worried About Running Out of Food in the Last Year: Never true     Ran Out of Food in the Last Year: Never true   Transportation Needs: No Transportation Needs (3/28/2025)    PRAPARE - Transportation     Lack of Transportation (Medical): No     Lack of Transportation (Non-Medical): No   Physical Activity: Insufficiently Active (2022)    Exercise Vital Sign     Days of Exercise per Week: 2 days     Minutes of Exercise per Session: 40 min   Stress: No Stress Concern Present (2022)    [employer] [place] [employer] - Occupational Stress Questionnaire     Feeling of Stress : Not at all   Social Connections: Moderately Integrated (2022)    Social Connection and Isolation Panel [NHANES]     Frequency of Communication with Friends and Family: More than three times a week     Frequency of Social Gatherings with Friends and Family: More than three times a week     Attends Religion Services: Never      Active Member of Clubs or Organizations: Yes     Attends Club or Organization Meetings: Never     Marital Status:    Intimate Partner Violence: Not At Risk (3/28/2025)    Humiliation, Afraid, Rape, and Kick questionnaire     Fear of Current or Ex-Partner: No     Emotionally Abused: No     Physically Abused: No     Sexually Abused: No   Housing Stability: Low Risk  (3/28/2025)    Housing Stability Vital Sign     Unable to Pay for Housing in the Last Year: No     Number of Times Moved in the Last Year: 0     Homeless in the Last Year: No       Medications  Current Medications[2]    Allergies  Allergies[3]    Physical Exam  Temp:  [38.7 °C (101.6 °F)-38.9 °C (102 °F)] 38.7 °C (101.6 °F)  Pulse:  [] 101  Resp:  [17-22] 17  BP: (179-211)/() 211/112  SpO2:  [90 %-99 %] 97 %    Physical Exam  Constitutional:       General: He is not in acute distress.     Appearance: He is well-developed. He is ill-appearing. He is not diaphoretic.   HENT:      Head: Normocephalic and atraumatic.      Mouth/Throat:      Mouth: Mucous membranes are moist.      Pharynx: Oropharynx is clear. No oropharyngeal exudate.   Eyes:      General: No scleral icterus.     Extraocular Movements: Extraocular movements intact.   Neck:      Trachea: No tracheal deviation.   Cardiovascular:      Rate and Rhythm: Normal rate.      Heart sounds: Normal heart sounds. No murmur heard.  Pulmonary:      Effort: Pulmonary effort is normal.      Breath sounds: Normal breath sounds. No stridor. No rales.   Abdominal:      General: There is no distension.      Palpations: Abdomen is soft.      Tenderness: There is no abdominal tenderness.   Musculoskeletal:         General: Deformity (Left BKA noted) present. Normal range of motion.      Right lower leg: Edema (1+) present.      Left lower leg: No edema.   Skin:     General: Skin is warm and dry.   Neurological:      General: No focal deficit present.      Mental Status: He is alert and oriented to  person, place, and time.   Psychiatric:         Mood and Affect: Mood normal.         Behavior: Behavior normal.     Dialysis access: Left arm AV fistula, patent bruit and thrill    Fluids      Laboratory  Labs reviewed, pertinent labs below.  Recent Labs     06/18/25  1150   WBC 6.7   RBC 3.62*   HEMOGLOBIN 10.6*   HEMATOCRIT 31.6*   MCV 87.3   MCH 29.3   MCHC 33.5   RDW 44.8   PLATELETCT 120*   MPV 10.0     Recent Labs     06/18/25  1150   SODIUM 137   POTASSIUM 3.4*   CHLORIDE 96   CO2 21   GLUCOSE 149*   BUN 59*   CREATININE 7.29*   CALCIUM 8.9     Recent Labs     06/18/25  1150   INR 1.27*            URINALYSIS:  Lab Results   Component Value Date/Time    COLORURINE Yellow 03/30/2025 0532    CLARITY Clear 03/30/2025 0532    SPECGRAVITY 1.018 03/30/2025 0532    PHURINE 8.0 03/30/2025 0532    KETONES Negative 03/30/2025 0532    PROTEINURIN >=1000 (A) 03/30/2025 0532    BILIRUBINUR Negative 03/30/2025 0532    UROBILU 0.2 03/30/2025 0532    NITRITE Negative 03/30/2025 0532    LEUKESTERAS Negative 03/30/2025 0532    OCCULTBLOOD Small (A) 03/30/2025 0532     UPC  Lab Results   Component Value Date/Time    TOTPROTUR 258.0 (H) 05/03/2023 1210      Lab Results   Component Value Date/Time    CREATININEU 27.57 05/03/2023 1210       Imaging interpreted by radiologist. Imaging reports reviewed with pertinent findings below  DX-CHEST-PORTABLE (1 VIEW)   Final Result      Persistent right pleural effusion and right basilar atelectasis/infiltrate with some new opacity in the left midlung field laterally.            Assessment/Plan  54 y.o. male with a history of hypertension, diabetes complicated by left BKA, ESRD on hemodialysis since March 2024, currently in week 3 of training for home hemodialysis who presented 6/18/2025 with fever.    1.  ESRD on home hemodialysis, and training for home hemodialysis.  Oligo-anuric.  Will monitor daily for dialysis needs, likely plan on dialysis at least 4 times per week while inpatient.   "No acute need for dialysis today, as patient already received the majority of his dialysis treatment in the home hemodialysis clinic today.  Daily weights. Avoid NSAIDs and other nephrotoxins as the patient still urinates.  Check renal function panel daily.    2.  Dialysis access: Left arm AV fistula, patent.  Left arm precautions.       3.  Fever, reason for admission.   Concern for skin infection versus pneumonia.  Agree with antibiotics.  As patient will be going back to home hemodialysis, there is no opportunity for IV antibiotics with dialysis treatments should this become necessary in the near future.    4.  Anemia of chronic disease.  Last dose of Mircera 150 mcg on 6/2/2025.  While inpatient, I will order Epogen 3 times weekly with dialysis.  Check CBC daily.    5.  Hypertension.  Uncontrolled, due to volume overload.  This is a struggle as an outpatient to as the patient does not follow a fluid restriction.  Recommend 1 L fluid restriction while inpatient so that we can successfully remove more fluid than he consumes.  Continue torsemide 100 mg once daily to help augment urine output.  Will continue ultrafiltration as tolerated by blood pressure, as high blood pressure usually improves with ultrafiltration with dialysis.  Recommend low-sodium diet.       6.  Hyperphosphatemia, uncontrolled as an outpatient.  Continue phosphorus binders, calcium acetate 2001 mg p.o. 3 times daily with meals.  Recommend low phosphorus diet.  Check \"renal function panel\" daily (includes phosphorus level).    7.  Secondary hyperparathyroidism of renal origin.  Recommend continue outpatient calcitriol 0.5 mcg p.o. once daily.    Discussed with Dr. Nolan Ortiz MD  Nephrology  [facility]               [1]   Past Surgical History:  Procedure Laterality Date    AV FISTULA REVISION Left 1/6/2025    Procedure: LEFT BRACHIOBASILIC FISTULA TRANSPOSITION;  Surgeon: Joyce Ureña M.D.;  Location: SURGERY SAME " DAY [facility];  Service: General    AV FISTULA CREATION Left 11/04/2024    Procedure: CREATION OF LEFT UPPER EXTREMITY  DIALYSIS FISTULA;  Surgeon: Joyce Ureña M.D.;  Location: SURGERY SAME DAY [facility];  Service: General    ORIF, FRACTURE, HUMERUS, PROXIMAL Left 08/30/2024    Procedure: LEFT OPEN REDUCTION INTERNAL FIXATION  HUMERUS, PROXIMAL, NON UNION REPAIR;  Surgeon: Aris Pierre M.D.;  Location: [facility];  Service: Orthopedics    KNEE AMPUTATION BELOW Left 02/01/2024    Procedure: AMPUTATION, BELOW KNEE;  Surgeon: Celestino Segura M.D.;  Location: [facility];  Service: Orthopedics    TOE AMPUTATION Left 01/30/2024    Procedure: AMPUTATION, TOE- 5TH;  Surgeon: Celestino Segura M.D.;  Location: [facility];  Service: Orthopedics    PB OPEN TREATMENT PBOX HUMERAL FRACTURE Left 12/11/2023    Procedure: LEFT OPEN REDUCTION INTERNAL FIXATION  HUMERUS, PROXIMAL AND LEFT BICEPS TENODESIS;  Surgeon: Aris Pierre M.D.;  Location: [facility];  Service: Orthopedics    PB REPAIR BICEPS LONG TENDON Left 12/11/2023    Procedure: TENODESIS, BICEPS, OPEN;  Surgeon: Aris Pierre M.D.;  Location: [facility];  Service: Orthopedics    CATARACT EXTRACTION WITH IOL Bilateral 2023    TOE AMPUTATION Left 09/30/2021    Procedure: AMPUTATION, TOE;  Surgeon: Tomer Hart M.D.;  Location: [facility];  Service: Orthopedics    STENT PLACEMENT  2019    STEMI with nonobstructive LAD    TOE AMPUTATION Right 07/02/2018    Procedure: Transmetatarsal amputation;  Surgeon: Ravi Bernardo M.D.;  Location: [facility];  Service: Orthopedics    ACHILLES TENDON REPAIR Right 07/02/2018    Procedure: ACHILLES LENGTHENING;  Surgeon: Ravi Bernardo M.D.;  Location: [facility];  Service: Orthopedics    IRRIGATION & DEBRIDEMENT ORTHO Right 07/02/2018    Procedure: IRRIGATION & DEBRIDEMENT ORTHO;  Surgeon: Ravi Bernardo M.D.;  Location: [facility]  [facility];  Service: Orthopedics    OTHER CARDIAC SURGERY  09/19/2019    Stent installed right side    OTHER ORTHOPEDIC SURGERY      SHOULDER SURGERY  12/11/2023    Something is not right in my shoulder/arm    TONSILLECTOMY      VASECTOMY     [2]   Current Facility-Administered Medications   Medication Dose Route Frequency Provider Last Rate Last Admin    azithromycin (Zithromax) injection 500 mg  500 mg Intravenous Once Isac Begum D.O.        ampicillin/sulbactam (Unasyn) 3 g in  mL IVPB  3 g Intravenous Once Isac Begum D.O. 200 mL/hr at 06/18/25 1318 3 g at 06/18/25 1318     Current Outpatient Medications   Medication Sig Dispense Refill    atorvastatin (LIPITOR) 80 MG tablet Take 40 mg by mouth every evening. 40 mg = 1/2 tab      insulin regular (HUMULIN R/NOVOLIN R) 100 UNIT/ML Solution Inject 6-12 Units under the skin with meals as needed for High Blood Sugar.      Insulin Degludec (TRESIBA SC) Inject 20 Units under the skin every evening.      atorvastatin (LIPITOR) 40 MG Tab Take 1 Tablet by mouth every evening. 90 Tablet 3    amLODIPine (NORVASC) 2.5 MG Tab Take 3 Tablets by mouth every day. 100 Tablet 3    cholestyramine (QUESTRAN) 4 g packet Take 4 g by mouth 2 times a day for 360 days. 60 Each 11    torsemide (DEMADEX) 100 MG Tab Take 1 Tablet by mouth every day. 90 Tablet 3    losartan (COZAAR) 100 MG Tab Take 1 Tablet by mouth every day. 90 Tablet 3    metOLazone (ZAROXOLYN) 5 MG Tab Take 1 Tablet by mouth every day. 90 Tablet 3    aspirin 81 MG EC tablet Take 1 Tablet by mouth every day. 30 Tablet 2    omeprazole (PRILOSEC) 20 MG delayed-release capsule Take 20 mg by mouth every morning.         azithromycin (ZITHROMAX) 250 MG Tab 2 tablets today then 1 tablet each day for the next 4 more days 6 Tablet 0    insulin glargine (LANTUS SOLOSTAR) 100 UNIT/ML Solution Pen-injector injection Inject 24 Units under the skin every evening. (Patient not taking: Reported on  6/18/2025) 9 mL 0   [3] No Known Allergies

## 2025-06-18 NOTE — ED PROVIDER NOTES
ED Provider Note    CHIEF COMPLAINT  Chief Complaint   Patient presents with    Fever     BIB [facility] from dialysis after patient had fatigue and shivering during session today. He got most of his session. He has wound to right lower extremity, PMH DM with left aka. He was given 650mg tylenol for fever PTA. They placed him on 3L NC for comfort, but sating well on RA on arrival           HPI/ROS    OUTSIDE HISTORIAN(S):  Patient's family is at bedside Andehist review of systems.    Beni Moore is a 54 y.o. male who presents with complaint of fever, leg pain.  The patient was at dialysis earlier today and made almost to his whole dialysis but started feeling very fatigued and had a fever and was sent here.  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  He has discharge on the right lower extremity/leg that has been increasing severity of his discharge.  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    SURGICAL HISTORY   has a past surgical history that  includes other orthopedic surgery; toe amputation (Right, 2018); achilles tendon repair (Right, 2018); irrigation & debridement ortho (Right, 2018); vasectomy; tonsillectomy; stent placement (); toe amputation (Left, 2021); cataract extraction with iol (Bilateral, ); open treatment prox humeral fracture (Left, 2023); repair biceps long tendon (Left, 2023); toe amputation (Left, 2024); knee amputation below (Left, 2024); shoulder surgery (2023); orif, fracture, humerus, proximal (Left, 2024); other cardiac surgery (2019); av fistula creation (Left, 2024); and av fistula revision (Left, 2025).    FAMILY HISTORY  Family History   Problem Relation Age of Onset    No Known Problems Mother     Diabetes Father         Adult onset    Heart Disease Father     Hypertension Father     Diabetes Maternal Grandmother     Heart Disease Maternal Grandfather     Lung Disease Paternal Grandmother     Cancer Paternal Grandmother     Cancer Paternal Grandfather     Lung Cancer Paternal Grandfather     Kidney Disease Neg Hx        SOCIAL HISTORY  Social History     Tobacco Use    Smoking status: Former     Current packs/day: 0.00     Average packs/day: 0.6 packs/day for 17.0 years (10.0 ttl pk-yrs)     Types: Cigarettes     Start date: 1988     Quit date:      Years since quittin.4     Passive exposure: Never    Smokeless tobacco: Never   Vaping Use    Vaping status: Never Used   Substance and Sexual Activity    Alcohol use: Not Currently     Comment: Quit     Drug use: Not Currently     Types: Oral     Comment: NOT CURRENTLY, HISTORY OF CBD    Sexual activity: Not Currently     Partners: Female       CURRENT MEDICATIONS  Home Medications       Reviewed by Linda Cordoba R.N. (Registered Nurse) on 25 at 1133  Med List Status: Partial     Medication Last Dose Status   acetaminophen (TYLENOL) 500 MG Tab  Active   amLODIPine  (NORVASC) 2.5 MG Tab  Active   aspirin 81 MG EC tablet  Active   atorvastatin (LIPITOR) 40 MG Tab  Active   azithromycin (ZITHROMAX) 250 MG Tab  Active   cholestyramine (QUESTRAN) 4 g packet  Active   insulin glargine (LANTUS SOLOSTAR) 100 UNIT/ML Solution Pen-injector injection  Active   insulin NPH (HUMULIN/NOVOLIN N) 100 UNIT/ML Suspension  Active   losartan (COZAAR) 100 MG Tab  Active   Methoxy PEG-Epoetin Beta (MIRCERA) 75 MCG/0.3ML Solution Prefilled Syringe  Active   metOLazone (ZAROXOLYN) 5 MG Tab  Active   omeprazole (PRILOSEC) 20 MG delayed-release capsule  Active   torsemide (DEMADEX) 100 MG Tab  Active                  Audit from Redirected Encounters    **Home medications have not yet been reviewed for this encounter**         ALLERGIES  Allergies[1]    PHYSICAL EXAM  VITAL SIGNS: BP (!) 179/86   Pulse 97   Temp (!) 38.7 °C (101.6 °F) (Oral)   Resp (!) 22   Ht 1.829 m (6')   Wt 102 kg (224 lb)   SpO2 92%   BMI 30.38 kg/m²      Nursing notes and vitals reviewed.  Constitutional: Well developed, Well nourished, No acute distress, Non-toxic appearance.   Eyes: EOMI, Conjunctiva normal, No discharge.   HENT: Normocephalic, Atraumatic, moist mucous membranes, no facial edema   Cardiovascular: Normal heart rate, Normal rhythm, No murmurs, No rubs, No gallops.   Thorax & Lungs: No respiratory distress, No rales, No rhonchi, No wheezing, No chest tenderness.   Abdomen:Soft, No tenderness, No guarding, No rebound, No masses, No pulsatile masses.   Skin: Please refer to picture  Extremities: No deformity, no edema, good range of motion range of motion upper lower extremes bilaterally  Neurologic: Alert & oriented x 3, no focal abnormalities noted, acting appropriately on examination  Psychiatric: Affect normal for clinical presentation.      EKG/LABS  Normal Sinus rhythm on monitor  I have independently interpreted this EKG  Results for orders placed or performed during the hospital encounter of 06/18/25    EKG (NOW)    Collection Time: 25 11:24 AM   Result Value Ref Range    Report       [facility] Emergency Dept.    Test Date:  2025  Pt Name:    HELIO BARROW                     Department: ER  MRN:        [id number]                      Room:        16  Gender:     Male                         Technician:  :        1970                   Requested By:ER TRIAGE PROTOCOL  Order #:    [id number]                    Reading MD:    Measurements  Intervals                                Axis  Rate:       100                          P:          0  RI:         0                            QRS:        92  QRSD:       114                          T:          -42  QT:         370  QTc:        478    Interpretive Statements  Atrial fibrillation  Borderline intraventricular conduction delay  Nonspecific repol abnormality, diffuse leads  Compared to ECG 2024 08:13:38  Early repolarization now present  Sinus rhythm no longer present     Lactic Acid    Collection Time: 25 11:50 AM   Result Value Ref Range    Lactic Acid 2.2 (H) 0.5 - 2.0 mmol/L   CBC with Differential    Collection Time: 25 11:50 AM   Result Value Ref Range    WBC 6.7 4.8 - 10.8 K/uL    RBC 3.62 (L) 4.70 - 6.10 M/uL    Hemoglobin 10.6 (L) 14.0 - 18.0 g/dL    Hematocrit 31.6 (L) 42.0 - 52.0 %    MCV 87.3 81.4 - 97.8 fL    MCH 29.3 27.0 - 33.0 pg    MCHC 33.5 32.3 - 36.5 g/dL    RDW 44.8 35.9 - 50.0 fL    Platelet Count 120 (L) 164 - 446 K/uL    MPV 10.0 9.0 - 12.9 fL    Neutrophils-Polys 84.00 (H) 44.00 - 72.00 %    Lymphocytes 8.60 (L) 22.00 - 41.00 %    Monocytes 5.70 0.00 - 13.40 %    Eosinophils 0.70 0.00 - 6.90 %    Basophils 0.30 0.00 - 1.80 %    Immature Granulocytes 0.70 0.00 - 0.90 %    Nucleated RBC 0.00 0.00 - 0.20 /100 WBC    Neutrophils (Absolute) 5.64 1.82 - 7.42 K/uL    Lymphs (Absolute) 0.58 (L) 1.00 - 4.80 K/uL    Monos (Absolute) 0.38 0.00 - 0.85 K/uL    Eos (Absolute) 0.05 0.00 - 0.51 K/uL     Baso (Absolute) 0.02 0.00 - 0.12 K/uL    Immature Granulocytes (abs) 0.05 0.00 - 0.11 K/uL    NRBC (Absolute) 0.00 K/uL   Complete Metabolic Panel    Collection Time: 06/18/25 11:50 AM   Result Value Ref Range    Sodium 137 135 - 145 mmol/L    Potassium 3.4 (L) 3.6 - 5.5 mmol/L    Chloride 96 96 - 112 mmol/L    Co2 21 20 - 33 mmol/L    Anion Gap 20.0 (H) 7.0 - 16.0    Glucose 149 (H) 65 - 99 mg/dL    Bun 59 (H) 8 - 22 mg/dL    Creatinine 7.29 (HH) 0.50 - 1.40 mg/dL    Calcium 8.9 8.5 - 10.5 mg/dL    Correct Calcium 8.7 8.5 - 10.5 mg/dL    AST(SGOT) 17 12 - 45 U/L    ALT(SGPT) 9 2 - 50 U/L    Alkaline Phosphatase 77 30 - 99 U/L    Total Bilirubin 1.2 0.1 - 1.5 mg/dL    Albumin 4.2 3.2 - 4.9 g/dL    Total Protein 7.9 6.0 - 8.2 g/dL    Globulin 3.7 (H) 1.9 - 3.5 g/dL    A-G Ratio 1.1 g/dL   CoV-2, FLU A/B, and RSV by PCR (2-4 Hours [employer]) : Collect NP swab in VTM    Collection Time: 06/18/25 11:50 AM    Specimen: Respirate   Result Value Ref Range    Influenza virus A RNA Negative Negative    Influenza virus B, PCR Negative Negative    RSV, PCR Negative Negative    SARS-CoV-2 by PCR NotDetected     SARS-CoV-2 Source NP Swab    MAGNESIUM    Collection Time: 06/18/25 11:50 AM   Result Value Ref Range    Magnesium 2.0 1.5 - 2.5 mg/dL   PROCALCITONIN    Collection Time: 06/18/25 11:50 AM   Result Value Ref Range    Procalcitonin 0.39 (H) <0.25 ng/mL   Prothrombin time (INR)    Collection Time: 06/18/25 11:50 AM   Result Value Ref Range    PT 15.9 (H) 12.0 - 14.6 sec    INR 1.27 (H) 0.87 - 1.13   ESTIMATED GFR    Collection Time: 06/18/25 11:50 AM   Result Value Ref Range    GFR (CKD-EPI) 8 (A) >60 mL/min/1.73 m 2       RADIOLOGY/PROCEDURES   I have independently interpreted the diagnostic imaging associated with this visit and am waiting the final reading from the radiologist.   My preliminary interpretation is as follows: Evidence of a right infiltrate on chest x-ray    Radiologist interpretation:  DX-CHEST-PORTABLE (1  VIEW)   Final Result      Persistent right pleural effusion and right basilar atelectasis/infiltrate with some new opacity in the left midlung field laterally.          COURSE & MEDICAL DECISION MAKING    ASSESSMENT, COURSE AND PLAN  Care Narrative: This is a pleasant 54-year-old gentleman presents with sepsis.  The patient does have evidence of cellulitis of right lower extremity as well as pneumonia on x-ray.  The patient is slightly hypoxic requiring oxygen supplementation.  The patient did receive IV fluids marginally secondary to his history of renal failure and dialysis needs.  He has received antibiotics in the form of Ancef, Unasyn and azithromycin.  Please note Ancef was started empirically as as well as cellulitis with the x-ray came back with pneumonia I added on Unasyn and azithromycin.  The patient does not have evidence of profound leukocytosis but does have a lactic acidosis, SIRS I do believe the patient has sepsis.  Sepsis: Infection was suspected 1230 (Time). Sepsis pathway was initiated. Less than 30cc/kg because of concern for volume overload 1000 mL of crystalloid was ordered. Antibiotics were given per protocol.    CRITICAL CARE  The very real possibilty of a deterioration of this patient's condition required the highest level of my preparedness for sudden, emergent intervention.  I provided critical care services, which included medication orders, frequent reevaluations of the patient's condition and response to treatment, ordering and reviewing test results, and discussing the case with various consultants.  The critical care time associated with the care of the patient was 35 minutes. Review chart for interventions. This time is exclusive of any other billable procedures.         DISPOSITION AND DISCUSSIONS  I have discussed management of the patient with the following physicians and CHRISTIANNE's: Discussed with  for hospitalization.    Discussion of management with other [facility] or  appropriate source(s): Pharmacy who recommends adding Unasyn as well azithromycin and Ancef secondary to the new onset of pneumonia as well as the cellulitis.       FINAL DIAGNOSIS  1. Cellulitis of right lower extremity    2.  Pneumonia  3.  Sepsis  4.  Critical care time 35 minutes     Electronically signed by: Isac Begum D.O., 6/18/2025 12:51 PM           [1] No Known Allergies

## 2025-06-18 NOTE — ASSESSMENT & PLAN NOTE
Cannot tolerate dialysis session due to feeling ill and febrile and was referred to ED  He follows with Dr. Ortiz and I have consulted him, nephrology will follow for HD needs and other recommendations, appreciated

## 2025-06-18 NOTE — ASSESSMENT & PLAN NOTE
Blood pressure up to 210/110  Asymptomatic  I have increased his amlodipine and continue losartan as well as ordered IV antihypertensives, continue to monitor and titrate medications appropriately  improved

## 2025-06-18 NOTE — ASSESSMENT & PLAN NOTE
Infiltrates on chest x-ray and patient has started to develop cough, has fever and elevated procalcitonin  ID consulted  Will continue these antibiotics  Blood culture growing MSSA, repeat blood cultures pending

## 2025-06-18 NOTE — H&P
Hospital Medicine History & Physical Note    Date of Service  6/18/2025    Primary Care Physician  Florinda Pascual M.D.    Consultants  nephrology    Specialist Names: Dr. Ortiz    Code Status  Full Code    Chief Complaint  Chief Complaint   Patient presents with    Fever     BIB [facility] from dialysis after patient had fatigue and shivering during session today. He got most of his session. He has wound to right lower extremity, PMH DM with left aka. He was given 650mg tylenol for fever PTA. They placed him on 3L NC for comfort, but sating well on RA on arrival       History of Presenting Illness  Beni Moore is a 54 y.o. male who presented 6/18/2025 with fever.  Very pleasant man with history of ESRD on HD, DM 2, prior alcohol and tobacco use in remission, hypertension.  He was at dialysis today and began feeling ill fairly quickly there during the session.  He was found to be febrile and was referred to the ED.  Initially was felt that he had right lower extremity cellulitis and was started on cefazolin however subsequently chest x-ray also revealed infiltrates and patient did report feeling like he was developing a cough so antibiotics were expanded to treat pneumonia with Unasyn and azithromycin.  He was febrile in the ED and had elevated lactic acid of 2.2, tachycardic.  EKG showed possible A-fib, awaiting repeat EKG at this time.    I discussed the plan of care with patient.    Review of Systems  Review of Systems   Constitutional:  Positive for chills, fever and malaise/fatigue.   Respiratory:  Positive for cough. Negative for shortness of breath.    Cardiovascular:  Negative for chest pain.   Gastrointestinal:  Negative for nausea and vomiting.   Neurological:  Negative for dizziness and headaches.       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), Heart burn, Hemorrhagic disorder ([facility]), High cholesterol, History of non-ST elevation myocardial infarction (NSTEMI), heart artery stent (2019), Hyperlipidemia, Hyperparathyroidism due to renal insufficiency ([facility]), Hypertension, Moderate mitral regurgitation (04/01/2025), Myocardial infarct ([facility]) (2019), Paroxysmal SVT (supraventricular tachycardia) ([facility]) -on telemetry and during MACKENZIE atrial tachycardia likely (04/01/2025), Pneumonia, Renal disorder (12/06/2023), and Shortness of breath (5/13/2025).    Surgical History   has a past surgical history that includes other orthopedic surgery; toe amputation (Right, 07/02/2018); achilles tendon repair (Right, 07/02/2018); irrigation & debridement ortho (Right, 07/02/2018); vasectomy; tonsillectomy; stent placement (2019); toe amputation (Left, 09/30/2021); cataract extraction with iol (Bilateral, 2023); pr open treatment prox humeral fracture (Left, 12/11/2023); pr repair biceps long tendon (Left, 12/11/2023); toe amputation (Left, 01/30/2024); knee amputation below (Left, 02/01/2024); shoulder surgery (12/11/2023); orif, fracture, humerus, proximal (Left, 08/30/2024); other cardiac surgery (09/19/2019); av fistula creation (Left, 11/04/2024); and av fistula revision (Left, 1/6/2025).     Family History  Family History   Problem Relation Age of Onset    No Known Problems Mother     Diabetes Father         Adult onset    Heart Disease Father     Hypertension Father     Diabetes Maternal Grandmother     Heart Disease Maternal Grandfather     Lung Disease Paternal Grandmother     Cancer Paternal Grandmother     Cancer Paternal Grandfather     Lung Cancer Paternal Grandfather     Kidney Disease Neg Hx         Family history reviewed with patient. There is no family history that is pertinent to the chief complaint.     Social History   reports that he quit  smoking about 23 years ago. His smoking use included cigarettes. He started smoking about 37 years ago. He has a 10 pack-year smoking history. He has never been exposed to tobacco smoke. He has never used smokeless tobacco. He reports that he does not currently use alcohol. He reports that he does not currently use drugs after having used the following drugs: Oral.    Allergies  Allergies[1]    Medications  Prior to Admission Medications   Prescriptions Last Dose Informant Patient Reported? Taking?   Insulin Degludec (TRESIBA SC)   Yes Yes   Sig: Inject 20 Units under the skin every evening.   amLODIPine (NORVASC) 2.5 MG Tab 2025 Morning  No Yes   Sig: Take 3 Tablets by mouth every day.   aspirin 81 MG EC tablet 2025 Morning Patient No Yes   Sig: Take 1 Tablet by mouth every day.   atorvastatin (LIPITOR) 40 MG Tab New Rx  No Yes   Sig: Take 1 Tablet by mouth every evening.   atorvastatin (LIPITOR) 80 MG tablet 2025 Evening  Yes Yes   Sig: Take 40 mg by mouth every evening. 40 mg = 1/2 tab   azithromycin (ZITHROMAX) 250 MG Tab   No No   Si tablets today then 1 tablet each day for the next 4 more days   cholestyramine (QUESTRAN) 4 g packet 2025 at  5:00 PM Patient No Yes   Sig: Take 4 g by mouth 2 times a day for 360 days.   insulin glargine (LANTUS SOLOSTAR) 100 UNIT/ML Solution Pen-injector injection Not Taking  No No   Sig: Inject 24 Units under the skin every evening.   Patient not taking: Reported on 2025   insulin regular (HUMULIN R/NOVOLIN R) 100 UNIT/ML Solution 2025  Yes Yes   Sig: Inject 6-12 Units under the skin with meals as needed for High Blood Sugar.   losartan (COZAAR) 100 MG Tab 2025 Morning Patient No Yes   Sig: Take 1 Tablet by mouth every day.   metOLazone (ZAROXOLYN) 5 MG Tab 2025 Morning Patient No Yes   Sig: Take 1 Tablet by mouth every day.   omeprazole (PRILOSEC) 20 MG delayed-release capsule 2025 Morning Patient Yes Yes   Sig: Take 20 mg by  "mouth every morning.      torsemide (DEMADEX) 100 MG Tab 6/18/2025 Morning Patient No Yes   Sig: Take 1 Tablet by mouth every day.      Facility-Administered Medications: None       Physical Exam  Temp:  [38.7 °C (101.6 °F)-39.7 °C (103.5 °F)] 39.7 °C (103.5 °F)  Pulse:  [] 107  Resp:  [14-22] 14  BP: (179-216)/() 216/102  SpO2:  [90 %-99 %] 96 %  Blood Pressure: (!) 211/112   Temperature: (!) 38.7 °C (101.6 °F)   Pulse: (!) 101   Respiration: 17   Pulse Oximetry: 97 %       Physical Exam  Constitutional:       General: He is not in acute distress.     Appearance: He is ill-appearing.   HENT:      Head: Normocephalic and atraumatic.      Mouth/Throat:      Mouth: Mucous membranes are moist.      Pharynx: Oropharynx is clear.   Eyes:      Extraocular Movements: Extraocular movements intact.   Cardiovascular:      Rate and Rhythm: Normal rate and regular rhythm.      Pulses: Normal pulses.      Heart sounds: Normal heart sounds.   Pulmonary:      Breath sounds: Normal breath sounds.   Abdominal:      Palpations: Abdomen is soft.   Skin:     General: Skin is warm and dry.      Comments: Right lower extremity appears red though not necessarily erythematous, unclear if baseline venous stasis to dermatitis, does not feel markedly more warm than left lower extremity however both are warm to touch in setting of fever.  Bilateral lower extremity amputations         Laboratory:  Recent Labs     06/18/25  1150   WBC 6.7   RBC 3.62*   HEMOGLOBIN 10.6*   HEMATOCRIT 31.6*   MCV 87.3   MCH 29.3   MCHC 33.5   RDW 44.8   PLATELETCT 120*   MPV 10.0     Recent Labs     06/18/25  1150   SODIUM 137   POTASSIUM 3.4*   CHLORIDE 96   CO2 21   GLUCOSE 149*   BUN 59*   CREATININE 7.29*   CALCIUM 8.9     Recent Labs     06/18/25  1150   ALTSGPT 9   ASTSGOT 17   ALKPHOSPHAT 77   TBILIRUBIN 1.2   GLUCOSE 149*     Recent Labs     06/18/25  1150   INR 1.27*     No results for input(s): \"NTPROBNP\" in the last 72 hours.      No results " "for input(s): \"TROPONINT\" in the last 72 hours.    Imaging:  DX-CHEST-PORTABLE (1 VIEW)   Final Result      Persistent right pleural effusion and right basilar atelectasis/infiltrate with some new opacity in the left midlung field laterally.          X-Ray:  I have personally reviewed the images and compared with prior images.  EKG:  I have personally reviewed the images and compared with prior images.    Assessment/Plan:  Justification for Admission Status  I anticipate this patient will require at least two midnights for appropriate medical management, necessitating inpatient admission because sepsis    Patient will need a Med/Surg bed on MEDICAL service .  The need is secondary to sepsis.    * Cellulitis- (present on admission)  Assessment & Plan  Right leg appears red and warm however difficult to distinguish this from his baseline venous stasis, left leg feels warm as well and patient is febrile so the warmth may be due to his general fever.  He was given cefazolin and then antibiotics expanded to Unasyn and azithromycin to cover community-acquired pneumonia after chest x-ray  Continue current antibiotics  Follow cultures    Abnormal EKG  Assessment & Plan  EKG read as A-fib  QRS waves do not march out with caliper so does appear it is irregular however does appear also there may be some P waves present  I have ordered a repeat EKG  Will monitor on telemetry  Start anticoagulation if indicated  Optimize electrolytes    Hypertensive emergency  Assessment & Plan  Blood pressure up to 210/110  Asymptomatic  I have increased his amlodipine and continue losartan as well as ordered IV antihypertensives, continue to monitor and titrate medications appropriately    Pneumonia due to infectious organism  Assessment & Plan  Infiltrates on chest x-ray and patient has started to develop cough, has fever and elevated procalcitonin  Antibiotics were expanded to cover for community-acquired pneumonia with Unasyn and " azithromycin  Will continue these antibiotics  Follow cultures    ESRD (end stage renal disease) on dialysis ([facility])- (present on admission)  Assessment & Plan  Cannot tolerate dialysis session due to feeling ill and febrile and was referred to ED  He follows with Dr. Ortiz and I have consulted him, nephrology will follow for HD needs and other recommendations, appreciated    Sepsis ([facility])- (present on admission)  Assessment & Plan  This is Sepsis Present on admission  SIRS criteria identified on my evaluation include: Fever, with temperature greater than 100.9 deg F and Tachycardia, with heart rate greater than 90 BPM  Clinical indicators of end organ dysfunction include Lactic Acid greater than 2  Source is pneumonia and/or cellulitis  Sepsis protocol initiated  Crystalloid Fluid Administration: Resuscitation volume of per EDP ordered. Reason that resuscitation volume of less than 30ml/kg was ordered concern for causing harm given ESRD  IV antibiotics as appropriate for source of sepsis  Reassessment: I have reassessed the patient's hemodynamic status    Type 2 diabetes mellitus with kidney complication, with long-term current use of insulin ([facility])- (present on admission)  Assessment & Plan  Insulin basal and correctional        VTE prophylaxis: heparin ppx         [1] No Known Allergies

## 2025-06-18 NOTE — ASSESSMENT & PLAN NOTE
This is Sepsis Present on admission  SIRS criteria identified on my evaluation include: Fever, with temperature greater than 100.9 deg F and Tachycardia, with heart rate greater than 90 BPM  Clinical indicators of end organ dysfunction include Lactic Acid greater than 2  Source is pneumonia and/or cellulitis  Sepsis protocol initiated  Crystalloid Fluid Administration: Resuscitation volume of per EDP ordered. Reason that resuscitation volume of less than 30ml/kg was ordered concern for causing harm given ESRD  IV antibiotics as appropriate for source of sepsis  Reassessment: I have reassessed the patient's hemodynamic status

## 2025-06-18 NOTE — ASSESSMENT & PLAN NOTE
New diagnosis  Echo reviewed  TSH normal  Cardiology reviewed EKG, does not believe patient has a fib  Can delete problem a fib tomorrow, here for documentation purposes today

## 2025-06-18 NOTE — ASSESSMENT & PLAN NOTE
Right leg appears red and warm however difficult to distinguish this from his baseline venous stasis, left leg feels warm as well and patient is febrile so the warmth may be due to his general fever.   Continue current antibiotics, ancef  Follow cultures  ID following  Clinically improving

## 2025-06-19 PROBLEM — I48.91 ATRIAL FIBRILLATION (HCC): Status: ACTIVE | Noted: 2025-06-18

## 2025-06-19 LAB
ANION GAP SERPL CALC-SCNC: 16 MMOL/L (ref 7–16)
BUN SERPL-MCNC: 70 MG/DL (ref 8–22)
CALCIUM SERPL-MCNC: 7.7 MG/DL (ref 8.5–10.5)
CHLORIDE SERPL-SCNC: 97 MMOL/L (ref 96–112)
CO2 SERPL-SCNC: 21 MMOL/L (ref 20–33)
CREAT SERPL-MCNC: 8.29 MG/DL (ref 0.5–1.4)
ERYTHROCYTE [DISTWIDTH] IN BLOOD BY AUTOMATED COUNT: 46.9 FL (ref 35.9–50)
GFR SERPLBLD CREATININE-BSD FMLA CKD-EPI: 7 ML/MIN/1.73 M 2
GLUCOSE BLD STRIP.AUTO-MCNC: 100 MG/DL (ref 65–99)
GLUCOSE BLD STRIP.AUTO-MCNC: 101 MG/DL (ref 65–99)
GLUCOSE BLD STRIP.AUTO-MCNC: 115 MG/DL (ref 65–99)
GLUCOSE BLD STRIP.AUTO-MCNC: 143 MG/DL (ref 65–99)
GLUCOSE SERPL-MCNC: 112 MG/DL (ref 65–99)
HCT VFR BLD AUTO: 30 % (ref 42–52)
HGB BLD-MCNC: 9.7 G/DL (ref 14–18)
MCH RBC QN AUTO: 29.1 PG (ref 27–33)
MCHC RBC AUTO-ENTMCNC: 32.3 G/DL (ref 32.3–36.5)
MCV RBC AUTO: 90.1 FL (ref 81.4–97.8)
PLATELET # BLD AUTO: 83 K/UL (ref 164–446)
PLATELETS.RETICULATED NFR BLD AUTO: 2.9 % (ref 0.6–13.1)
PMV BLD AUTO: 10.6 FL (ref 9–12.9)
POTASSIUM SERPL-SCNC: 3.5 MMOL/L (ref 3.6–5.5)
RBC # BLD AUTO: 3.33 M/UL (ref 4.7–6.1)
SODIUM SERPL-SCNC: 134 MMOL/L (ref 135–145)
TSH SERPL DL<=0.005 MIU/L-ACNC: 2.46 UIU/ML (ref 0.38–5.33)
WBC # BLD AUTO: 5.5 K/UL (ref 4.8–10.8)

## 2025-06-19 PROCEDURE — A9270 NON-COVERED ITEM OR SERVICE: HCPCS | Performed by: STUDENT IN AN ORGANIZED HEALTH CARE EDUCATION/TRAINING PROGRAM

## 2025-06-19 PROCEDURE — 36415 COLL VENOUS BLD VENIPUNCTURE: CPT

## 2025-06-19 PROCEDURE — 700102 HCHG RX REV CODE 250 W/ 637 OVERRIDE(OP): Performed by: STUDENT IN AN ORGANIZED HEALTH CARE EDUCATION/TRAINING PROGRAM

## 2025-06-19 PROCEDURE — 770020 HCHG ROOM/CARE - TELE (206)

## 2025-06-19 PROCEDURE — 700105 HCHG RX REV CODE 258: Performed by: STUDENT IN AN ORGANIZED HEALTH CARE EDUCATION/TRAINING PROGRAM

## 2025-06-19 PROCEDURE — 85055 RETICULATED PLATELET ASSAY: CPT

## 2025-06-19 PROCEDURE — A9270 NON-COVERED ITEM OR SERVICE: HCPCS

## 2025-06-19 PROCEDURE — 5A1D70Z PERFORMANCE OF URINARY FILTRATION, INTERMITTENT, LESS THAN 6 HOURS PER DAY: ICD-10-PCS | Performed by: INTERNAL MEDICINE

## 2025-06-19 PROCEDURE — 700111 HCHG RX REV CODE 636 W/ 250 OVERRIDE (IP): Mod: JZ | Performed by: STUDENT IN AN ORGANIZED HEALTH CARE EDUCATION/TRAINING PROGRAM

## 2025-06-19 PROCEDURE — 99223 1ST HOSP IP/OBS HIGH 75: CPT | Performed by: INTERNAL MEDICINE

## 2025-06-19 PROCEDURE — 99233 SBSQ HOSP IP/OBS HIGH 50: CPT | Performed by: STUDENT IN AN ORGANIZED HEALTH CARE EDUCATION/TRAINING PROGRAM

## 2025-06-19 PROCEDURE — 80048 BASIC METABOLIC PNL TOTAL CA: CPT

## 2025-06-19 PROCEDURE — 82962 GLUCOSE BLOOD TEST: CPT | Performed by: STUDENT IN AN ORGANIZED HEALTH CARE EDUCATION/TRAINING PROGRAM

## 2025-06-19 PROCEDURE — 85027 COMPLETE CBC AUTOMATED: CPT

## 2025-06-19 PROCEDURE — 84443 ASSAY THYROID STIM HORMONE: CPT

## 2025-06-19 PROCEDURE — 700101 HCHG RX REV CODE 250: Performed by: STUDENT IN AN ORGANIZED HEALTH CARE EDUCATION/TRAINING PROGRAM

## 2025-06-19 PROCEDURE — 700102 HCHG RX REV CODE 250 W/ 637 OVERRIDE(OP)

## 2025-06-19 PROCEDURE — 90935 HEMODIALYSIS ONE EVALUATION: CPT

## 2025-06-19 RX ORDER — ATORVASTATIN CALCIUM 40 MG/1
40 TABLET, FILM COATED ORAL EVERY EVENING
Status: DISCONTINUED | OUTPATIENT
Start: 2025-06-19 | End: 2025-06-24 | Stop reason: HOSPADM

## 2025-06-19 RX ORDER — LOPERAMIDE HYDROCHLORIDE 2 MG/1
2 CAPSULE ORAL ONCE
Status: COMPLETED | OUTPATIENT
Start: 2025-06-19 | End: 2025-06-19

## 2025-06-19 RX ORDER — CEFAZOLIN SODIUM 1 G/50ML
1 INJECTION, SOLUTION INTRAVENOUS EVERY 24 HOURS
Status: DISCONTINUED | OUTPATIENT
Start: 2025-06-20 | End: 2025-06-24 | Stop reason: HOSPADM

## 2025-06-19 RX ORDER — AZITHROMYCIN 250 MG/1
500 TABLET, FILM COATED ORAL ONCE
Status: COMPLETED | OUTPATIENT
Start: 2025-06-20 | End: 2025-06-20

## 2025-06-19 RX ORDER — CHOLESTYRAMINE LIGHT 4 G/5.7G
1 POWDER, FOR SUSPENSION ORAL 2 TIMES DAILY
Status: DISCONTINUED | OUTPATIENT
Start: 2025-06-19 | End: 2025-06-19

## 2025-06-19 RX ORDER — CHOLESTYRAMINE LIGHT 4 G/5.7G
1 POWDER, FOR SUSPENSION ORAL 2 TIMES DAILY
Status: DISCONTINUED | OUTPATIENT
Start: 2025-06-19 | End: 2025-06-24 | Stop reason: HOSPADM

## 2025-06-19 RX ADMIN — LOSARTAN POTASSIUM 100 MG: 50 TABLET, FILM COATED ORAL at 06:00

## 2025-06-19 RX ADMIN — LOPERAMIDE HYDROCHLORIDE 2 MG: 2 CAPSULE ORAL at 04:23

## 2025-06-19 RX ADMIN — ACETAMINOPHEN 650 MG: 325 TABLET ORAL at 00:10

## 2025-06-19 RX ADMIN — OXYCODONE HYDROCHLORIDE 10 MG: 10 TABLET ORAL at 16:33

## 2025-06-19 RX ADMIN — LINEZOLID 600 MG: 600 TABLET, FILM COATED ORAL at 06:00

## 2025-06-19 RX ADMIN — ASPIRIN 81 MG: 81 TABLET, COATED ORAL at 06:00

## 2025-06-19 RX ADMIN — METOLAZONE 5 MG: 5 TABLET ORAL at 06:00

## 2025-06-19 RX ADMIN — CHOLESTYRAMINE 4 G: 4 POWDER, FOR SUSPENSION ORAL at 09:59

## 2025-06-19 RX ADMIN — HEPARIN SODIUM 5000 UNITS: 5000 INJECTION, SOLUTION INTRAVENOUS; SUBCUTANEOUS at 16:33

## 2025-06-19 RX ADMIN — ACETAMINOPHEN 650 MG: 325 TABLET ORAL at 12:06

## 2025-06-19 RX ADMIN — OXYCODONE HYDROCHLORIDE 10 MG: 10 TABLET ORAL at 04:23

## 2025-06-19 RX ADMIN — OMEPRAZOLE 20 MG: 20 CAPSULE, DELAYED RELEASE ORAL at 06:00

## 2025-06-19 RX ADMIN — AMPICILLIN AND SULBACTAM 3 G: 1; 2 INJECTION, POWDER, FOR SOLUTION INTRAMUSCULAR; INTRAVENOUS at 05:51

## 2025-06-19 RX ADMIN — OXYCODONE HYDROCHLORIDE 10 MG: 10 TABLET ORAL at 19:43

## 2025-06-19 RX ADMIN — HEPARIN SODIUM 5000 UNITS: 5000 INJECTION, SOLUTION INTRAVENOUS; SUBCUTANEOUS at 05:58

## 2025-06-19 RX ADMIN — TORSEMIDE 100 MG: 100 TABLET ORAL at 06:00

## 2025-06-19 RX ADMIN — ATORVASTATIN CALCIUM 40 MG: 40 TABLET, FILM COATED ORAL at 19:43

## 2025-06-19 RX ADMIN — AZITHROMYCIN 500 MG: 500 INJECTION, POWDER, LYOPHILIZED, FOR SOLUTION INTRAVENOUS at 06:32

## 2025-06-19 RX ADMIN — ACETAMINOPHEN 650 MG: 325 TABLET ORAL at 06:00

## 2025-06-19 RX ADMIN — CHOLESTYRAMINE 4 G: 4 POWDER, FOR SUSPENSION ORAL at 17:02

## 2025-06-19 RX ADMIN — AMLODIPINE BESYLATE 10 MG: 10 TABLET ORAL at 06:00

## 2025-06-19 RX ADMIN — OXYCODONE 5 MG: 5 TABLET ORAL at 13:16

## 2025-06-19 RX ADMIN — ACETAMINOPHEN 650 MG: 325 TABLET ORAL at 18:05

## 2025-06-19 ASSESSMENT — PAIN DESCRIPTION - PAIN TYPE
TYPE: ACUTE PAIN

## 2025-06-19 ASSESSMENT — ENCOUNTER SYMPTOMS
COUGH: 0
SHORTNESS OF BREATH: 0
PALPITATIONS: 0
HEADACHES: 0
SENSORY CHANGE: 0
INSOMNIA: 0
NAUSEA: 0
ABDOMINAL PAIN: 0
DIZZINESS: 0
TINGLING: 1
BACK PAIN: 0
FEVER: 1
FALLS: 0
BLURRED VISION: 0
LOSS OF CONSCIOUSNESS: 0
CHILLS: 0
VOMITING: 0
FOCAL WEAKNESS: 0
EYE PAIN: 0

## 2025-06-19 ASSESSMENT — LIFESTYLE VARIABLES: SUBSTANCE_ABUSE: 0

## 2025-06-19 ASSESSMENT — FIBROSIS 4 INDEX: FIB4 SCORE: 2.64

## 2025-06-19 NOTE — PROCEDURES
Date of service: 06/19/25    Diagnosis: End-Stage Renal Disease. Patient seen and examined on hemodialysis during treatment. Patient is stable, tolerating hemodialysis. Denies chest pain and shortness of breath. Orders updated as needed. Please refer to flowsheet for full details.    Access: LUE AVF  UF goal: 3-4L as tolerated.    Plan: Patient stable on HD, has been doing Home HD training. Recommend moving forward with HD at least 4 days per week while inpatient. Likely next HD on Saturday.     Re: MSSA bacteremia, IV antibiotics with dialysis will NOT be an option if patient returns to Home HD treatments, and he might need to go back to [facility] if he needs IV antibiotics with HD.     Varun Ortiz MD  Nephrology   [facility]

## 2025-06-19 NOTE — PROGRESS NOTES
[facility] Medicine Daily Progress Note    Date of Service  6/19/2025    Chief Complaint  Beni Moore is a 54 y.o. male admitted 6/18/2025 with fever.    Hospital Course    Beni Moore is a 54 y.o. male who presented 6/18/2025 with fever.  Very pleasant man with history of ESRD on HD, DM 2, prior alcohol and tobacco use in remission, hypertension.  He was at dialysis today and began feeling ill fairly quickly there during the session.  He was found to be febrile and was referred to the ED.  Initially was felt that he had right lower extremity cellulitis and was started on cefazolin however subsequently chest x-ray also revealed infiltrates and patient did report feeling like he was developing a cough so antibiotics were expanded to treat pneumonia with Unasyn and azithromycin.  He was febrile in the ED and had elevated lactic acid of 2.2, tachycardic.  EKG showed possible A-fib, awaiting repeat EKG at this time.     Interval Problem Update  No acute events overnight. Patient with fever yesterday up to 103.5, now improving.  He reports mild dry cough, chronic neuropathy symptoms in distal extremities, and recent right leg wound for past 5 days.  Tolerating dialysis well at bedside, continue HD per nephrology.  Leg wound culture growing GPC in clusters. Blood cultures growing MSSA. ID consulted.  Transition to ancef given MSSA.   Echo ordered, MRI L spine ordered given reported back pain in setting of bacteremia.  Records reviewed showing hospitalization in April for strep bacteremia which was related to leg wound as well. He completed antibiotics for that, MACKENZIE negative for endocarditis at that time.  New diagnosis a fib on EKG here. He denies any hx of a fib or irregular heartbeat.  Echo pending already for MSSA bacteremia and now for a fib too.  TSH level ordered.  Discussed anticoagulation for a fib given hx HTN, DM, increased stroke risk. He is agreeable to starting anticoagulation. Will hold off for now  until sure he has no procedures required such as any spine procedure if infection is found.  Continue lantus and ISS, adjust as needed.      I have discussed this patient's plan of care and discharge plan at IDT rounds today with Case Management, Nursing, Nursing leadership, and other members of the IDT team.    Consultants/Specialty  infectious disease and nephrology    Code Status  Full Code    Disposition  The patient is not medically cleared for discharge to home or a post-acute facility.      I have placed the appropriate orders for post-discharge needs.    Review of Systems  Review of Systems   Constitutional:  Positive for fever. Negative for chills.   Eyes:  Negative for blurred vision and pain.   Respiratory:  Negative for cough and shortness of breath.    Cardiovascular:  Negative for chest pain, palpitations and leg swelling.   Gastrointestinal:  Negative for abdominal pain, nausea and vomiting.   Genitourinary:  Negative for dysuria and urgency.   Musculoskeletal:  Negative for back pain and falls.   Skin:  Negative for itching and rash.   Neurological:  Positive for tingling. Negative for dizziness, sensory change, focal weakness, loss of consciousness and headaches.   Psychiatric/Behavioral:  Negative for substance abuse. The patient does not have insomnia.         Physical Exam  Temp:  [36.4 °C (97.5 °F)-38.3 °C (100.9 °F)] 37.3 °C (99.2 °F)  Pulse:  [72-92] 72  Resp:  [8-19] 16  BP: (122-179)/(72-94) 157/94  SpO2:  [93 %-99 %] 96 %    Physical Exam  Constitutional:       General: He is not in acute distress.     Appearance: He is not ill-appearing.   HENT:      Head: Normocephalic and atraumatic.      Right Ear: External ear normal.      Left Ear: External ear normal.      Mouth/Throat:      Pharynx: No oropharyngeal exudate or posterior oropharyngeal erythema.   Eyes:      Extraocular Movements: Extraocular movements intact.      Pupils: Pupils are equal, round, and reactive to light.    Cardiovascular:      Rate and Rhythm: Normal rate and regular rhythm.      Pulses: Normal pulses.      Heart sounds: Normal heart sounds.   Pulmonary:      Effort: Pulmonary effort is normal. No respiratory distress.      Breath sounds: Normal breath sounds. No wheezing or rales.   Abdominal:      General: Bowel sounds are normal. There is no distension.      Palpations: Abdomen is soft.      Tenderness: There is no abdominal tenderness.   Musculoskeletal:         General: Signs of injury present. No swelling or tenderness. Normal range of motion.      Cervical back: Normal range of motion and neck supple.      Right lower leg: No edema.      Left lower leg: No edema.      Comments: Left BKA, R TMA; right anterior shin wound with surrounding erythema   Skin:     General: Skin is warm and dry.   Neurological:      General: No focal deficit present.      Mental Status: He is oriented to person, place, and time.      Cranial Nerves: No cranial nerve deficit.      Motor: No weakness.   Psychiatric:         Mood and Affect: Mood normal.         Behavior: Behavior normal.         Fluids    Intake/Output Summary (Last 24 hours) at 6/19/2025 1411  Last data filed at 6/19/2025 0813  Gross per 24 hour   Intake 340 ml   Output 0 ml   Net 340 ml        Laboratory  Recent Labs     06/18/25  1150 06/19/25  0652   WBC 6.7 5.5   RBC 3.62* 3.33*   HEMOGLOBIN 10.6* 9.7*   HEMATOCRIT 31.6* 30.0*   MCV 87.3 90.1   MCH 29.3 29.1   MCHC 33.5 32.3   RDW 44.8 46.9   PLATELETCT 120* 83*   MPV 10.0 10.6     Recent Labs     06/18/25  1150 06/19/25  0652   SODIUM 137 134*   POTASSIUM 3.4* 3.5*   CHLORIDE 96 97   CO2 21 21   GLUCOSE 149* 112*   BUN 59* 70*   CREATININE 7.29* 8.29*   CALCIUM 8.9 7.7*     Recent Labs     06/18/25  1150   INR 1.27*               Imaging  DX-CHEST-PORTABLE (1 VIEW)   Final Result      Persistent right pleural effusion and right basilar atelectasis/infiltrate with some new opacity in the left midlung field  laterally.      EC-ECHOCARDIOGRAM COMPLETE W/ CONT    (Results Pending)   MR-LUMBAR SPINE-WITH    (Results Pending)        Assessment/Plan  * Cellulitis- (present on admission)  Assessment & Plan  Right leg appears red and warm however difficult to distinguish this from his baseline venous stasis, left leg feels warm as well and patient is febrile so the warmth may be due to his general fever.   Continue current antibiotics, ancef  Follow cultures  ID following    Atrial fibrillation ([facility])  Assessment & Plan  New diagnosis  Echo pending  TSH check  Anticoagulation when no procedures planned given elevated CHADSVASc score  Currently rate controlled without additional medications  Will monitor on telemetry  Optimize electrolytes    Hypertensive emergency  Assessment & Plan  Blood pressure up to 210/110  Asymptomatic  I have increased his amlodipine and continue losartan as well as ordered IV antihypertensives, continue to monitor and titrate medications appropriately  improved    Pneumonia due to infectious organism  Assessment & Plan  Infiltrates on chest x-ray and patient has started to develop cough, has fever and elevated procalcitonin  ID consulted  Will continue these antibiotics  Follow cultures    Bacteremia- (present on admission)  Assessment & Plan  Blood culture with MSSA  Hx of strep bacteremia in April, completed antibiotics then  Echo pending  MRI lumbar spine to rule out infection given reported back pain  ID following  Continue ancef    ESRD (end stage renal disease) on dialysis ([facility])- (present on admission)  Assessment & Plan  Cannot tolerate dialysis session due to feeling ill and febrile and was referred to ED  He follows with Dr. Ortiz and I have consulted him, nephrology will follow for HD needs and other recommendations, appreciated    Sepsis ([facility])- (present on admission)  Assessment & Plan  This is Sepsis Present on admission  SIRS criteria identified on my evaluation include: Fever, with  temperature greater than 100.9 deg F and Tachycardia, with heart rate greater than 90 BPM  Clinical indicators of end organ dysfunction include Lactic Acid greater than 2  Source is pneumonia and/or cellulitis  Sepsis protocol initiated  Crystalloid Fluid Administration: Resuscitation volume of per EDP ordered. Reason that resuscitation volume of less than 30ml/kg was ordered concern for causing harm given ESRD  IV antibiotics as appropriate for source of sepsis  Reassessment: I have reassessed the patient's hemodynamic status    Type 2 diabetes mellitus with kidney complication, with long-term current use of insulin ([facility])- (present on admission)  Assessment & Plan  Insulin basal and correctional         VTE prophylaxis: VTE Selection    I have performed a physical exam and reviewed and updated ROS and Plan today (6/19/2025). In review of yesterday's note (6/18/2025), there are no changes except as documented above.    My total time spent caring for the patient on the day of the encounter was 53 minutes. This time includes reviewing the hospital chart vitals, lab tests, and imaging; counseling and educating the patient about their diagnoses; coordinating care with the nurse, pharmacist, and specialists; documenting clinical information in the electronic medical record.  This does not include time spent on separately billable procedures/tests.

## 2025-06-19 NOTE — PROGRESS NOTES
[facility] Services Progress Note         HD today x 3.5 hours per Dr. Ortiz.  Tx initiated at 1102 and ended at 1433      Pt A/O x 4, not in distress, on O2 support at 1.5L/min, no bleeding, denies chest pain, but with generalized edema, an c/o chills while on dialysis. Pt reported that his last HD treatment was cut short d/t chills. LUE AVF (+) bruit and thrill. Infectious disease Dr. Crawford is ok to use the AVF for dialysis. Pt is capable of self cannulation using buttonhole needles. Pre HD temp at 99.2 F (+) consent    Pt successfully cannulated arterial site but had trouble cannulating the venous site. Dr. Ortiz was informed and said ok to use a G16 sharp needle to cannulate the venous site.    NET UF: 3500 ml    Patient tolerated tx.   1217: patient felt cold and feverish; temp at 98.4F  1315: pt had mild chills    Pt was able to sleep following administration of due pain medication; chills resolved temporarily during sleep but reappeared after awakening. Pt is on q6 of tylenol for fever management.       All blood was returned aseptically. HD needles removed from LUE AVF. Dry gauze applied and changed without bleeding issue.(+) Bruit and thrill pre/post tx. Should breakthrough bleeding occur, staff RN to apply pressure to access sites until bleeding resolved. Notify Dialysis and Nephrologist for follow-up.See eflow sheets for further details.    Report given to LISA Russell RN

## 2025-06-19 NOTE — PROGRESS NOTES
Bedside report received, assumed care of patient at change of shift. Chart, labs, and orders reviewed. Pt resting in bed, breathing even and unlabored, complains of pain, declines intervention, and in no acute distress. A&O x4. Tele monitoring in place. Fall precautions including bed alarm in place and education provided. Call light within reach, bed locked and in lowest position, denies other needs at this time.

## 2025-06-19 NOTE — DISCHARGE PLANNING
Outpatient Dialysis Note     Confirmed patient is active at:     [facility]  [address] [place] [address] [address], [place] [address]     Schedule: PD    Patient is followed by Dr. Ortiz    Forwarded records for review.     Xitlaly Reyes   Dialysis Coordinator / [facility]  Ph: [phone]

## 2025-06-19 NOTE — PROGRESS NOTES
Bedside report received and patient care assumed. Pt is resting in bed, A&O4, with no complaints of pain, and is on 1 LPM nasal cannula. Tele box on. All fall precautions are in place, belongings at bedside table.  Pt was updated on POC, no questions or concerns. Pt educated on use of call light for assistance.

## 2025-06-19 NOTE — PROGRESS NOTES
1655 Pt arrival from ED. Pt assessed, O2 connected to wall, placed on tele box, VSS. LLE prosthetic and personal WC at bedside. Discussed plan of care, bed alarm, call light, and orientation to unit. Pt states and demonstrates understanding.

## 2025-06-19 NOTE — PROGRESS NOTES
Monitor Summary  Rhythm: SR  Rate: 67-71  Ectopy: (R) PVC  Measurements: .14/.10/.46  ---12 hr Chart Review---

## 2025-06-19 NOTE — CONSULTS
INFECTIOUS DISEASES INPATIENT CONSULT NOTE     Date of Service: 6/19/2025    Consult Requested By: Candido Patiño M.D.    Reason for Consultation: MSSA bacteremia    History of Present Illness:   Beni Moore is a 54 y.o. man with a history of ESRD on hemodialysis via left upper extremity AV fistula, right TMA, left BKA, left shoulder hardware in place, type 2 diabetes mellitus and hypertension admitted 6/18/2025 to fever.  Extensive review of emergency physician notes, hospital medicine notes and consultant notes performed.  Patient was admitted in March secondary to group A strep bacteremia secondary to cellulitis.  He had an abnormal TTE with mobile echodensity on the left coronary leaflet concerning for vegetation.  However MACKENZIE was negative.  He was seen by the infectious disease service who recommended a 10-day course of antibiotics.  Earlier on the day of admission, patient was undergoing dialysis and felt poorly and thus he was referred to the ED for further evaluation.  Patient states that he likely had fevers for a few days prior to admission.  He felt weak.  He has also noted new lower back pain.  He does have hardware in his left shoulder but is not complaining of any pain in that area.  Chest x-ray shows new opacity in the left midlung.  Blood cultures on admission are growing MSSA.  He was also found to have right lower extremity cellulitis which patient states started from hitting his right shin..  Wound culture is pending.  Patient is currently on azithromycin and Unasyn.      All other review of systems reviewed and negative except those documented above in the HPI.     PMH:   Past Medical History[1]    PSH:  Past Surgical History[2]    FAMILY HX:  Family History   Problem Relation Age of Onset    No Known Problems Mother     Diabetes Father         Adult onset    Heart Disease Father     Hypertension Father     Diabetes Maternal Grandmother     Heart Disease Maternal Grandfather     Lung Disease  Paternal Grandmother     Cancer Paternal Grandmother     Cancer Paternal Grandfather     Lung Cancer Paternal Grandfather     Kidney Disease Neg Hx        SOCIAL HX:  Social History     Socioeconomic History    Marital status:      Spouse name: Not on file    Number of children: Not on file    Years of education: Not on file    Highest education level: Some college, no degree   Occupational History    Not on file   Tobacco Use    Smoking status: Former     Current packs/day: 0.00     Average packs/day: 0.6 packs/day for 17.0 years (10.0 ttl pk-yrs)     Types: Cigarettes     Start date: 1988     Quit date:      Years since quittin.4     Passive exposure: Never    Smokeless tobacco: Never   Vaping Use    Vaping status: Never Used   Substance and Sexual Activity    Alcohol use: Not Currently     Comment: Quit     Drug use: Not Currently     Types: Oral     Comment: NOT CURRENTLY, HISTORY OF CBD    Sexual activity: Not Currently     Partners: Female   Other Topics Concern    Not on file   Social History Narrative    Not on file     Social Drivers of Health     Financial Resource Strain: Low Risk  (2022)    Overall Financial Resource Strain (CARDIA)     Difficulty of Paying Living Expenses: Not hard at all   Food Insecurity: No Food Insecurity (3/28/2025)    Hunger Vital Sign     Worried About Running Out of Food in the Last Year: Never true     Ran Out of Food in the Last Year: Never true   Transportation Needs: No Transportation Needs (3/28/2025)    PRAPARE - Transportation     Lack of Transportation (Medical): No     Lack of Transportation (Non-Medical): No   Physical Activity: Insufficiently Active (2022)    Exercise Vital Sign     Days of Exercise per Week: 2 days     Minutes of Exercise per Session: 40 min   Stress: No Stress Concern Present (2022)    [employer] [place] [employer] - Occupational Stress Questionnaire     Feeling of Stress : Not at all   Social  Connections: Moderately Integrated (2022)    Social Connection and Isolation Panel [NHANES]     Frequency of Communication with Friends and Family: More than three times a week     Frequency of Social Gatherings with Friends and Family: More than three times a week     Attends Orthodox Services: Never     Active Member of Clubs or Organizations: Yes     Attends Club or Organization Meetings: Never     Marital Status:    Intimate Partner Violence: Not At Risk (3/28/2025)    Humiliation, Afraid, Rape, and Kick questionnaire     Fear of Current or Ex-Partner: No     Emotionally Abused: No     Physically Abused: No     Sexually Abused: No   Housing Stability: Low Risk  (3/28/2025)    Housing Stability Vital Sign     Unable to Pay for Housing in the Last Year: No     Number of Times Moved in the Last Year: 0     Homeless in the Last Year: No     Tobacco Use History[3]  Social History     Substance and Sexual Activity   Alcohol Use Not Currently    Comment: Quit        Allergies/Intolerances:  Allergies[4]    History reviewed with the patient     Other Current Medications:  Current Medications[5]  [unfilled]    Most Recent Vital Signs:  /72   Pulse 76   Temp 36.4 °C (97.5 °F) (Temporal)   Resp 18   Ht 1.829 m (6')   Wt 100 kg (221 lb 1.9 oz)   SpO2 99%   BMI 29.99 kg/m²   Temp  Av.7 °C (99.9 °F)  Min: 36.4 °C (97.5 °F)  Max: 39.7 °C (103.5 °F)    Physical Exam:  General: well nourished, no diaphoresis, well-appearing, no acute distress  HEENT: sclera anicteric, extraocular muscles intact, mucous membranes moist, oropharynx clear and moist, no oral lesions or exudate  Neck: supple, no lymphadenopathy  Chest: CTAB, no rhonchi or wheezes, normal work of breathing.  Cardiac: regular rate and rhythm, normal S1 S2, no murmurs, rubs or gallops  Abdomen: + bowel sounds, soft, non-tender, non-distended  Extremities: Evidence of right TMA.  Site clean.  Evidence of left BKA.  Site clean.   He has a small superficial wound on the right lower extremity with surrounding erythema.  Left upper extremity AV fistula  Skin: warm and dry, no rashes or worrisome lesions  Neuro: Alert and oriented times 3, non-focal exam, speech fluent, full range of motion to bilateral upper and lower extremities  Psych: normal mood and behavior, pleasant; memory intact, normal judgement    Pertinent Lab Results:  Recent Labs     06/18/25  1150 06/19/25  0652   WBC 6.7 5.5      Recent Labs     06/18/25  1150 06/19/25  0652   HEMOGLOBIN 10.6* 9.7*   HEMATOCRIT 31.6* 30.0*   MCV 87.3 90.1   MCH 29.3 29.1   PLATELETCT 120* 83*         Recent Labs     06/18/25  1150 06/19/25  0652   SODIUM 137 134*   POTASSIUM 3.4* 3.5*   CHLORIDE 96 97   CO2 21 21   CREATININE 7.29* 8.29*        Recent Labs     06/18/25  1150   ALBUMIN 4.2        Pertinent Micro:  Results       Procedure Component Value Units Date/Time    Blood Culture - Draw one from central line and one from peripheral site [[id number]]  (Abnormal) Collected: 06/18/25 1150    Order Status: Completed Specimen: Blood from Line Updated: 06/19/25 0147     Significant Indicator POS     Source BLD     Site Peripheral     Culture Result Growth detected by automated blood culture system. 06/19/2025  01:46  Gram Stain: Gram positive cocci in clusters.      Blood Culture - Draw one from central line and one from peripheral site [[id number]]  (Abnormal) Collected: 06/18/25 1207    Order Status: Completed Specimen: Blood from Peripheral Updated: 06/19/25 0146     Significant Indicator POS     Source BLD     Site PERIPHERAL     Culture Result Growth detected by automated blood culture system. 06/19/2025  01:44  Gram Stain: Gram positive cocci in clusters.  Staphylococcus aureus (methicillin sensitive)  detected by PCR.  Susceptibility to follow.      MRSA By PCR (Amp) [[id number]] Collected: 06/18/25 1456    Order Status: Completed Specimen: Respirate from Nares Updated: 06/18/25 1848     MRSA  by PCR Negative    GRAM STAIN [[id number]] Collected: 06/18/25 1150    Order Status: Completed Specimen: Wound Updated: 06/18/25 1420     Significant Indicator .     Source WND     Site RIGHT LEG     Gram Stain Result No organisms seen.    CULTURE WOUND W/ GRAM STAIN [[id number]] Collected: 06/18/25 1150    Order Status: Sent Specimen: Wound from Right Leg Updated: 06/18/25 1420     Significant Indicator NEG     Source WND     Site RIGHT LEG     Culture Result -     Gram Stain Result No organisms seen.    CoV-2, FLU A/B, and RSV by PCR (2-4 Hours RespiricsID) : Collect NP swab in VTM [[id number]] Collected: 06/18/25 1150    Order Status: Completed Specimen: Respirate Updated: 06/18/25 1300     Influenza virus A RNA Negative     Influenza virus B, PCR Negative     RSV, PCR Negative     SARS-CoV-2 by PCR NotDetected     Comment: [facility] providers: PLEASE REFER TO DE-ESCALATION AND RETESTING PROTOCOL  on [email or web address]    **The [employer] GeneXpert Xpress SARS-CoV-2 RT-PCR Test has been made  available for use under the Emergency Use Authorization (EUA) only.          SARS-CoV-2 Source NP Swab    Urinalysis [[id number]]     Order Status: Sent Specimen: Urine     Urine Culture (New) [[id number]]     Order Status: Sent Specimen: Urine           Blood Culture   Date Value Ref Range Status   06/30/2018 No growth after 5 days of incubation.  Final        Studies:  DX-CHEST-PORTABLE (1 VIEW)  Result Date: 6/18/2025 6/18/2025 11:34 AM HISTORY/REASON FOR EXAM:  Sepsis. TECHNIQUE/EXAM DESCRIPTION AND NUMBER OF VIEWS: Single portable view of the chest. COMPARISON: 5/13/2025 FINDINGS: The cardiomediastinal silhouette is stable. There is persistent right pleural effusion and right basilar atelectasis. There are some increased markings in left midlung field laterally which may be due to atelectasis or infiltrate. There is no definite left pleural effusion or pneumothorax.     Persistent right pleural effusion and right basilar  atelectasis/infiltrate with some new opacity in the left midlung field laterally.      IMPRESSION:   MSSA bacteremia  Acute hypoxic respiratory failure  Right lower extremity cellulitis  Pneumonia  Thrombocytopenia  ESRD on hemodialysis  HD catheter in place  Type 2 diabetes mellitus  Right TMA  Left BKA  History of group A streptococcus bacteremia in March 2025  History of left shoulder hardware in place    ASSESSMENT/PLAN:   Beni Moore is a 54 y.o. male with a history of ESRD on hemodialysis, type 2 diabetes mellitus, history of group A streptococcus bacteremia in March secondary to cellulitis admitted on 6/18/2025 secondary to fever and malaise.  Patient now found to have MSSA bacteremia    -Discontinue IV Unasyn  -Transition to IV cefazolin 1 g daily, renal dosing  -Repeat blood cultures x 2 ordered for tomorrow morning  -Echo ordered  - Recommend MRI of the lumbar spine with contrast given new onset back pain in the setting of MSSA bacteremia    This infection pose a threat to life    Disposition: TBD    Need for tunneled catheter: no, patient can do cefazolin 2 g, 2 g, 3 g at hemodialysis    Plan of care discussed with LEILA Patiño M.D.. Will continue to follow    Ebony Crawford M.D.      Please note that this dictation was created using voice recognition software. I have worked with technical experts from [facility] to optimize the interface.  I have made every reasonable attempt to correct obvious errors, but there may be errors of grammar and possibly content that I did not discover before finalizing the note.    ADDENDUM:  Discussed with Dr. Ortiz. Pt is currently on home HD 5x weekly.        [1]   Past Medical History:  Diagnosis Date    Anesthesia     Hypotension post-op, persisted for a few months x 1    Anesthesia 12/06/2023    Patient stated after 2016 colonoscopy he became hypotensive.    Bowel habit changes     History GI problems    Cataract     bilateral IOL    Coronary artery  disease due to lipid rich plaque - post PCI to RCA in 2019 10/02/2019    Deaf, right     Diabetes ([facility])     Oral medications & insulin-8/30/24 only insulin    Dialysis patient ([facility]) 08/29/2024    once a week on wednesday at 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    Heart burn     GERD; takes Prilosec    Hemorrhagic disorder ([facility])     ASA protection for stent and cardiac history    High cholesterol     medicated x 2    History of non-ST elevation myocardial infarction (NSTEMI)     Per Problem List    Hx of heart artery stent 2019    Hyperlipidemia     Hyperparathyroidism due to renal insufficiency ([facility])     Per Problem List    Hypertension     medicated    Moderate mitral regurgitation 04/01/2025    Myocardial infarct ([facility]) 2019    FOLLOWED BY DR TO    Paroxysmal SVT (supraventricular tachycardia) ([facility]) -on telemetry and during MACKENZIE atrial tachycardia likely 04/01/2025    Pneumonia     H/O    Renal disorder 12/06/2023    Stage 3 CKD; 12/9/2024 dialysis at [facility] on Wednesday's only via CVC    Shortness of breath 5/13/2025   [2]   Past Surgical History:  Procedure Laterality Date    AV FISTULA REVISION Left 1/6/2025    Procedure: LEFT BRACHIOBASILIC FISTULA TRANSPOSITION;  Surgeon: Joyce Ureña M.D.;  Location: SURGERY SAME DAY [facility];  Service: General    AV FISTULA CREATION Left 11/04/2024    Procedure: CREATION OF LEFT UPPER EXTREMITY  DIALYSIS FISTULA;  Surgeon: Joyce Ureña M.D.;  Location: SURGERY SAME DAY [facility];  Service: General    ORIF, FRACTURE, HUMERUS, PROXIMAL Left 08/30/2024    Procedure: LEFT OPEN REDUCTION INTERNAL FIXATION  HUMERUS, PROXIMAL, NON UNION REPAIR;  Surgeon: Aris Pierre M.D.;  Location: SURGERY [facility];  Service: Orthopedics    KNEE AMPUTATION BELOW Left 02/01/2024    Procedure: AMPUTATION, BELOW KNEE;  Surgeon: Celestino Segura M.D.;  Location: SURGERY [facility];  Service: Orthopedics    TOE AMPUTATION Left 01/30/2024    Procedure:  AMPUTATION, TOE- 5TH;  Surgeon: Celestino Segura M.D.;  Location: SURGERY [facility];  Service: Orthopedics    PB OPEN TREATMENT PBOX HUMERAL FRACTURE Left 2023    Procedure: LEFT OPEN REDUCTION INTERNAL FIXATION  HUMERUS, PROXIMAL AND LEFT BICEPS TENODESIS;  Surgeon: Aris Pierre M.D.;  Location: SURGERY [facility];  Service: Orthopedics    PB REPAIR BICEPS LONG TENDON Left 2023    Procedure: TENODESIS, BICEPS, OPEN;  Surgeon: Aris Pierre M.D.;  Location: SURGERY [facility];  Service: Orthopedics    CATARACT EXTRACTION WITH IOL Bilateral     TOE AMPUTATION Left 2021    Procedure: AMPUTATION, TOE;  Surgeon: Tomer Hart M.D.;  Location: [facility];  Service: Orthopedics    STENT PLACEMENT  2019    STEMI with nonobstructive LAD    TOE AMPUTATION Right 2018    Procedure: Transmetatarsal amputation;  Surgeon: Ravi Bernardo M.D.;  Location: [facility];  Service: Orthopedics    ACHILLES TENDON REPAIR Right 2018    Procedure: ACHILLES LENGTHENING;  Surgeon: Ravi Bernardo M.D.;  Location: [facility];  Service: Orthopedics    IRRIGATION & DEBRIDEMENT ORTHO Right 2018    Procedure: IRRIGATION & DEBRIDEMENT ORTHO;  Surgeon: Ravi Bernardo M.D.;  Location: [facility];  Service: Orthopedics    OTHER CARDIAC SURGERY  2019    Stent installed right side    OTHER ORTHOPEDIC SURGERY      SHOULDER SURGERY  2023    Something is not right in my shoulder/arm    TONSILLECTOMY      VASECTOMY     [3]   Social History  Tobacco Use   Smoking Status Former    Current packs/day: 0.00    Average packs/day: 0.6 packs/day for 17.0 years (10.0 ttl pk-yrs)    Types: Cigarettes    Start date: 1988    Quit date:     Years since quittin.4    Passive exposure: Never   Smokeless Tobacco Never   [4] No Known Allergies  [5]   Current Facility-Administered Medications:     cholestyramine (Questran) 4 GM powder 4 g,  1 Packet, Oral, BID, Candido Patiño M.D.    heparin injection 5,000 Units, 5,000 Units, Subcutaneous, Q12HRS, Nolan Yanes M.D., 5,000 Units at 06/19/25 0558    ampicillin/sulbactam (Unasyn) 3 g in  mL IVPB, 3 g, Intravenous, Q24HRS, Nolan Yanes M.D., Stopped at 06/19/25 0621    azithromycin (ZITHROMAX) 500 mg in D5W 250 mL IVPB premix, 500 mg, Intravenous, Q24HRS, Nolan Yanes M.D., Stopped at 06/19/25 0732    aspirin EC tablet 81 mg, 81 mg, Oral, DAILY, Nolan Yanes M.D., 81 mg at 06/19/25 0600    amLODIPine (Norvasc) tablet 10 mg, 10 mg, Oral, DAILY, Nolan Yanes M.D., 10 mg at 06/19/25 0600    atorvastatin (Lipitor) tablet 40 mg, 40 mg, Oral, Q EVENING, Nolan Yanes M.D., 40 mg at 06/18/25 1811    losartan (Cozaar) tablet 100 mg, 100 mg, Oral, DAILY, Nolan Yanes M.D., 100 mg at 06/19/25 0600    metOLazone (Zaroxolyn) tablet 5 mg, 5 mg, Oral, DAILY, Nolan Yanes M.D., 5 mg at 06/19/25 0600    torsemide (Demadex) tablet 100 mg, 100 mg, Oral, DAILY, Nolan Yanes M.D., 100 mg at 06/19/25 0600    omeprazole (PriLOSEC) capsule 20 mg, 20 mg, Oral, QAM, Nolan Yanes M.D., 20 mg at 06/19/25 0600    labetalol (Normodyne/Trandate) injection 10 mg, 10 mg, Intravenous, Q6HRS PRN, Nolan Yanes M.D., 10 mg at 06/18/25 1359    insulin GLARGINE (Lantus,Semglee) injection, 10 Units, Subcutaneous, Q EVENING, 10 Units at 06/18/25 1821 **AND** insulin lispro (HumaLOG,AdmeLOG) subcutaneous injection, 1-6 Units, Subcutaneous, 4X/DAY ACHS, 1 Units at 06/18/25 2055 **AND** POC blood glucose manual result, , , Q AC AND BEDTIME(S) **AND** NOTIFY MD and PharmD, , , Once **AND** Administer 20 grams of glucose (approximately 8 ounces of fruit juice) every 15 minutes PRN FSBG less than 70 mg/dL, , , PRN **AND** dextrose 50 % (D50W) injection 25 g, 25 g, Intravenous, Q15 MIN PRN, Nolan Yanes M.D.    acetaminophen (Tylenol) tablet 650 mg, 650 mg, Oral, Q6HRS, 650 mg  at 06/19/25 0600 **FOLLOWED BY** [START ON 6/23/2025] acetaminophen (Tylenol) tablet 650 mg, 650 mg, Oral, Q6HRS PRN, Nolan Yanes M.D.    oxyCODONE immediate-release (Roxicodone) tablet 5 mg, 5 mg, Oral, Q3HRS PRN **OR** oxyCODONE immediate release (Roxicodone) tablet 10 mg, 10 mg, Oral, Q3HRS PRN, 10 mg at 06/19/25 0423 **OR** HYDROmorphone (Dilaudid) injection 0.5 mg, 0.5 mg, Intravenous, Q3HRS PRN, Nolan Yanes M.D.    [START ON 6/20/2025] epoetin (Retacrit) 5,000 Units injection, 5,000 Units, Intravenous, MO, WE + FR, Varun Ortiz M.D.    NS (Bolus) 0.9 % infusion 100 mL, 100 mL, Intravenous, DIALYSIS PRN, Varun Ortiz M.D.    lidocaine (Xylocaine) 1 % injection 1 mL, 1 mL, Intradermal, DIALYSIS PRN, Varun Ortiz M.D.    lidocaine-prilocaine (Emla) 2.5-2.5 % cream, , Topical, DIALYSIS PRN, Varun Ortiz M.D.

## 2025-06-19 NOTE — CARE PLAN
The patient is Stable - Low risk of patient condition declining or worsening    Shift Goals  Clinical Goals: Safety, monitor vitals, IV antibiotics, hemodynamic stability  Patient Goals: Sleep  Family Goals: JENNIFER    Progress made toward(s) clinical / shift goals:        Problem: Knowledge Deficit - Standard  Goal: Patient and family/care givers will demonstrate understanding of plan of care, disease process/condition, diagnostic tests and medications  Outcome: Progressing     Problem: Fluid Volume  Goal: Fluid volume balance will be maintained  Outcome: Progressing     Problem: Respiratory  Goal: Patient will achieve/maintain optimum respiratory ventilation and gas exchange  Outcome: Progressing     Problem: Skin Integrity  Goal: Skin integrity is maintained or improved  Outcome: Progressing       Patient is not progressing towards the following goals:

## 2025-06-20 PROBLEM — B95.61 MSSA BACTEREMIA: Status: ACTIVE | Noted: 2025-03-29

## 2025-06-20 LAB
ANION GAP SERPL CALC-SCNC: 16 MMOL/L (ref 7–16)
BACTERIA WND AEROBE CULT: ABNORMAL
BACTERIA WND AEROBE CULT: ABNORMAL
BUN SERPL-MCNC: 45 MG/DL (ref 8–22)
CALCIUM SERPL-MCNC: 8.3 MG/DL (ref 8.5–10.5)
CHLORIDE SERPL-SCNC: 98 MMOL/L (ref 96–112)
CO2 SERPL-SCNC: 23 MMOL/L (ref 20–33)
CREAT SERPL-MCNC: 7.08 MG/DL (ref 0.5–1.4)
ERYTHROCYTE [DISTWIDTH] IN BLOOD BY AUTOMATED COUNT: 46.8 FL (ref 35.9–50)
GFR SERPLBLD CREATININE-BSD FMLA CKD-EPI: 8 ML/MIN/1.73 M 2
GLUCOSE BLD STRIP.AUTO-MCNC: 100 MG/DL (ref 65–99)
GLUCOSE BLD STRIP.AUTO-MCNC: 143 MG/DL (ref 65–99)
GLUCOSE BLD STRIP.AUTO-MCNC: 156 MG/DL (ref 65–99)
GLUCOSE BLD STRIP.AUTO-MCNC: 189 MG/DL (ref 65–99)
GLUCOSE SERPL-MCNC: 108 MG/DL (ref 65–99)
GRAM STN SPEC: ABNORMAL
HCT VFR BLD AUTO: 29 % (ref 42–52)
HGB BLD-MCNC: 9.4 G/DL (ref 14–18)
MCH RBC QN AUTO: 28.9 PG (ref 27–33)
MCHC RBC AUTO-ENTMCNC: 32.4 G/DL (ref 32.3–36.5)
MCV RBC AUTO: 89.2 FL (ref 81.4–97.8)
PLATELET # BLD AUTO: 95 K/UL (ref 164–446)
PLATELETS.RETICULATED NFR BLD AUTO: 3.4 % (ref 0.6–13.1)
PMV BLD AUTO: 10.5 FL (ref 9–12.9)
POTASSIUM SERPL-SCNC: 3.5 MMOL/L (ref 3.6–5.5)
RBC # BLD AUTO: 3.25 M/UL (ref 4.7–6.1)
SIGNIFICANT IND 70042: ABNORMAL
SITE SITE: ABNORMAL
SODIUM SERPL-SCNC: 137 MMOL/L (ref 135–145)
SOURCE SOURCE: ABNORMAL
WBC # BLD AUTO: 4.7 K/UL (ref 4.8–10.8)

## 2025-06-20 PROCEDURE — 87040 BLOOD CULTURE FOR BACTERIA: CPT | Mod: 91

## 2025-06-20 PROCEDURE — 770020 HCHG ROOM/CARE - TELE (206)

## 2025-06-20 PROCEDURE — 700102 HCHG RX REV CODE 250 W/ 637 OVERRIDE(OP): Performed by: STUDENT IN AN ORGANIZED HEALTH CARE EDUCATION/TRAINING PROGRAM

## 2025-06-20 PROCEDURE — 99232 SBSQ HOSP IP/OBS MODERATE 35: CPT | Performed by: INTERNAL MEDICINE

## 2025-06-20 PROCEDURE — 85027 COMPLETE CBC AUTOMATED: CPT

## 2025-06-20 PROCEDURE — 700111 HCHG RX REV CODE 636 W/ 250 OVERRIDE (IP): Performed by: STUDENT IN AN ORGANIZED HEALTH CARE EDUCATION/TRAINING PROGRAM

## 2025-06-20 PROCEDURE — A9270 NON-COVERED ITEM OR SERVICE: HCPCS | Performed by: STUDENT IN AN ORGANIZED HEALTH CARE EDUCATION/TRAINING PROGRAM

## 2025-06-20 PROCEDURE — 99232 SBSQ HOSP IP/OBS MODERATE 35: CPT | Performed by: STUDENT IN AN ORGANIZED HEALTH CARE EDUCATION/TRAINING PROGRAM

## 2025-06-20 PROCEDURE — 700111 HCHG RX REV CODE 636 W/ 250 OVERRIDE (IP): Mod: JZ | Performed by: INTERNAL MEDICINE

## 2025-06-20 PROCEDURE — 80048 BASIC METABOLIC PNL TOTAL CA: CPT

## 2025-06-20 PROCEDURE — 85055 RETICULATED PLATELET ASSAY: CPT

## 2025-06-20 PROCEDURE — 99233 SBSQ HOSP IP/OBS HIGH 50: CPT | Performed by: INTERNAL MEDICINE

## 2025-06-20 PROCEDURE — 36415 COLL VENOUS BLD VENIPUNCTURE: CPT

## 2025-06-20 PROCEDURE — 82962 GLUCOSE BLOOD TEST: CPT | Performed by: STUDENT IN AN ORGANIZED HEALTH CARE EDUCATION/TRAINING PROGRAM

## 2025-06-20 RX ORDER — LOPERAMIDE HYDROCHLORIDE 2 MG/1
2 CAPSULE ORAL 4 TIMES DAILY PRN
Status: DISCONTINUED | OUTPATIENT
Start: 2025-06-20 | End: 2025-06-24 | Stop reason: HOSPADM

## 2025-06-20 RX ADMIN — ACETAMINOPHEN 650 MG: 325 TABLET ORAL at 11:36

## 2025-06-20 RX ADMIN — ACETAMINOPHEN 650 MG: 325 TABLET ORAL at 17:54

## 2025-06-20 RX ADMIN — ASPIRIN 81 MG: 81 TABLET, COATED ORAL at 05:24

## 2025-06-20 RX ADMIN — ACETAMINOPHEN 650 MG: 325 TABLET ORAL at 05:23

## 2025-06-20 RX ADMIN — CHOLESTYRAMINE 4 G: 4 POWDER, FOR SUSPENSION ORAL at 17:02

## 2025-06-20 RX ADMIN — OXYCODONE 5 MG: 5 TABLET ORAL at 21:32

## 2025-06-20 RX ADMIN — ACETAMINOPHEN 650 MG: 325 TABLET ORAL at 01:01

## 2025-06-20 RX ADMIN — OXYCODONE 5 MG: 5 TABLET ORAL at 17:55

## 2025-06-20 RX ADMIN — INSULIN LISPRO 1 UNITS: 100 INJECTION, SOLUTION INTRAVENOUS; SUBCUTANEOUS at 17:06

## 2025-06-20 RX ADMIN — OXYCODONE HYDROCHLORIDE 10 MG: 10 TABLET ORAL at 05:20

## 2025-06-20 RX ADMIN — INSULIN LISPRO 1 UNITS: 100 INJECTION, SOLUTION INTRAVENOUS; SUBCUTANEOUS at 21:35

## 2025-06-20 RX ADMIN — TORSEMIDE 100 MG: 100 TABLET ORAL at 05:24

## 2025-06-20 RX ADMIN — AZITHROMYCIN DIHYDRATE 500 MG: 250 TABLET ORAL at 05:24

## 2025-06-20 RX ADMIN — OXYCODONE 5 MG: 5 TABLET ORAL at 14:37

## 2025-06-20 RX ADMIN — CHOLESTYRAMINE 4 G: 4 POWDER, FOR SUSPENSION ORAL at 10:09

## 2025-06-20 RX ADMIN — METOLAZONE 5 MG: 5 TABLET ORAL at 05:24

## 2025-06-20 RX ADMIN — LOPERAMIDE HYDROCHLORIDE 2 MG: 2 CAPSULE ORAL at 09:46

## 2025-06-20 RX ADMIN — LOSARTAN POTASSIUM 100 MG: 50 TABLET, FILM COATED ORAL at 05:24

## 2025-06-20 RX ADMIN — AMLODIPINE BESYLATE 10 MG: 10 TABLET ORAL at 05:23

## 2025-06-20 RX ADMIN — HEPARIN SODIUM 5000 UNITS: 5000 INJECTION, SOLUTION INTRAVENOUS; SUBCUTANEOUS at 17:54

## 2025-06-20 RX ADMIN — ATORVASTATIN CALCIUM 40 MG: 40 TABLET, FILM COATED ORAL at 21:31

## 2025-06-20 RX ADMIN — HEPARIN SODIUM 5000 UNITS: 5000 INJECTION, SOLUTION INTRAVENOUS; SUBCUTANEOUS at 05:24

## 2025-06-20 RX ADMIN — OXYCODONE 5 MG: 5 TABLET ORAL at 09:35

## 2025-06-20 RX ADMIN — OMEPRAZOLE 20 MG: 20 CAPSULE, DELAYED RELEASE ORAL at 05:24

## 2025-06-20 RX ADMIN — CEFAZOLIN SODIUM 1 G: 1 INJECTION, SOLUTION INTRAVENOUS at 16:37

## 2025-06-20 ASSESSMENT — PAIN DESCRIPTION - PAIN TYPE
TYPE: ACUTE PAIN

## 2025-06-20 ASSESSMENT — ENCOUNTER SYMPTOMS
COUGH: 0
LOSS OF CONSCIOUSNESS: 0
FOCAL WEAKNESS: 0
EYES NEGATIVE: 1
WEIGHT LOSS: 0
BACK PAIN: 0
BACK PAIN: 1
FEVER: 0
INSOMNIA: 0
SENSORY CHANGE: 0
NAUSEA: 0
EYE PAIN: 0
DIZZINESS: 0
ABDOMINAL PAIN: 0
HEADACHES: 0
WHEEZING: 0
SINUS PAIN: 0
BLURRED VISION: 0
PALPITATIONS: 0
HEMOPTYSIS: 0
TINGLING: 1
CHILLS: 0
ORTHOPNEA: 0
VOMITING: 0
FALLS: 0
FEVER: 1
SHORTNESS OF BREATH: 0

## 2025-06-20 ASSESSMENT — FIBROSIS 4 INDEX: FIB4 SCORE: 3.82

## 2025-06-20 ASSESSMENT — LIFESTYLE VARIABLES: SUBSTANCE_ABUSE: 0

## 2025-06-20 NOTE — PROGRESS NOTES
Infectious Disease Progress Note    Author: Ebony Crawford M.D. Date & Time of service: 2025  7:44 AM    Chief Complaint:  Follow-up for MSSA bacteremia    Interval History:   Tmax 102.1, WBC 4.7.  Patient feeling better today.  He is considering going back to outpatient hemodialysis    Labs Reviewed and Medications Reviewed.    Review of Systems:  Review of Systems   Constitutional:  Positive for fever.   Musculoskeletal:  Positive for back pain.       Hemodynamics:  Temp (24hrs), Av.5 °C (99.5 °F), Min:36.4 °C (97.5 °F), Max:38.9 °C (102.1 °F)  Temperature: 36.6 °C (97.8 °F)  Pulse  Av.2  Min: 72  Max: 127   Blood Pressure: (!) 171/108       Physical Exam:  Physical Exam  Vitals and nursing note reviewed.   Cardiovascular:      Rate and Rhythm: Normal rate.   Pulmonary:      Effort: Pulmonary effort is normal.   Musculoskeletal:      Comments: Right lower extremity with superficial wound with surrounding erythema    Left upper extremity AV fistula   Skin:     General: Skin is warm and dry.   Neurological:      General: No focal deficit present.      Mental Status: He is alert and oriented to person, place, and time.         Meds:    Current Facility-Administered Medications:     cholestyramine    ceFAZolin    atorvastatin    heparin    aspirin    amLODIPine    losartan    metOLazone    torsemide    omeprazole    labetalol    [DISCONTINUED] insulin GLARGINE **AND** insulin lispro **AND** POC blood glucose manual result **AND** NOTIFY MD and PharmD **AND** Administer 20 grams of glucose (approximately 8 ounces of fruit juice) every 15 minutes PRN FSBG less than 70 mg/dL **AND** dextrose bolus    acetaminophen **FOLLOWED BY** [START ON 2025] acetaminophen    oxyCODONE immediate-release **OR** oxyCODONE immediate-release **OR** HYDROmorphone    epoetin    NS    lidocaine    lidocaine-prilocaine    Labs:  Recent Labs     25  1150 25  0652 25  0330   WBC 6.7 5.5 4.7*   RBC  3.62* 3.33* 3.25*   HEMOGLOBIN 10.6* 9.7* 9.4*   HEMATOCRIT 31.6* 30.0* 29.0*   MCV 87.3 90.1 89.2   MCH 29.3 29.1 28.9   RDW 44.8 46.9 46.8   PLATELETCT 120* 83* 95*   MPV 10.0 10.6 10.5   NEUTSPOLYS 84.00*  --   --    LYMPHOCYTES 8.60*  --   --    MONOCYTES 5.70  --   --    EOSINOPHILS 0.70  --   --    BASOPHILS 0.30  --   --      Recent Labs     06/18/25  1150 06/19/25  0652 06/20/25  0330   SODIUM 137 134* 137   POTASSIUM 3.4* 3.5* 3.5*   CHLORIDE 96 97 98   CO2 21 21 23   GLUCOSE 149* 112* 108*   BUN 59* 70* 45*     Recent Labs     06/18/25  1150 06/19/25  0652 06/20/25  0330   ALBUMIN 4.2  --   --    TBILIRUBIN 1.2  --   --    ALKPHOSPHAT 77  --   --    TOTPROTEIN 7.9  --   --    ALTSGPT 9  --   --    ASTSGOT 17  --   --    CREATININE 7.29* 8.29* 7.08*       Imaging:  DX-CHEST-PORTABLE (1 VIEW)  Result Date: 6/18/2025 6/18/2025 11:34 AM HISTORY/REASON FOR EXAM:  Sepsis. TECHNIQUE/EXAM DESCRIPTION AND NUMBER OF VIEWS: Single portable view of the chest. COMPARISON: 5/13/2025 FINDINGS: The cardiomediastinal silhouette is stable. There is persistent right pleural effusion and right basilar atelectasis. There are some increased markings in left midlung field laterally which may be due to atelectasis or infiltrate. There is no definite left pleural effusion or pneumothorax.     Persistent right pleural effusion and right basilar atelectasis/infiltrate with some new opacity in the left midlung field laterally.      Micro:  Results       Procedure Component Value Units Date/Time    BLOOD CULTURE [[id number]] Collected: 06/20/25 0330    Order Status: Completed Specimen: Blood from Peripheral Updated: 06/20/25 0608     Significant Indicator NEG     Source BLD     Site PERIPHERAL     Culture Result No Growth  Note: Blood cultures are incubated for 5 days and  are monitored continuously.Positive blood cultures  are called to the RN and reported as soon as  they are identified.      BLOOD CULTURE [[id number]] Collected:  06/20/25 0336    Order Status: Completed Specimen: Blood from Peripheral Updated: 06/20/25 0608     Significant Indicator NEG     Source BLD     Site PERIPHERAL     Culture Result No Growth  Note: Blood cultures are incubated for 5 days and  are monitored continuously.Positive blood cultures  are called to the RN and reported as soon as  they are identified.      CULTURE WOUND W/ GRAM STAIN [[id number]]  (Abnormal) Collected: 06/18/25 1150    Order Status: Completed Specimen: Wound from Right Leg Updated: 06/19/25 1722     Significant Indicator POS     Source WND     Site RIGHT LEG     Culture Result Light growth usual skin mable.     Gram Stain Result No organisms seen.     Culture Result Staphylococcus aureus  Light growth  Methicillin sensitive by screening method      Blood Culture - Draw one from central line and one from peripheral site [[id number]]  (Abnormal) Collected: 06/18/25 1150    Order Status: Completed Specimen: Blood from Line Updated: 06/19/25 0147     Significant Indicator POS     Source BLD     Site Peripheral     Culture Result Growth detected by automated blood culture system. 06/19/2025  01:46  Gram Stain: Gram positive cocci in clusters.      Blood Culture - Draw one from central line and one from peripheral site [[id number]]  (Abnormal) Collected: 06/18/25 1207    Order Status: Completed Specimen: Blood from Peripheral Updated: 06/19/25 0146     Significant Indicator POS     Source BLD     Site PERIPHERAL     Culture Result Growth detected by automated blood culture system. 06/19/2025  01:44  Gram Stain: Gram positive cocci in clusters.  Staphylococcus aureus (methicillin sensitive)  detected by PCR.  Susceptibility to follow.      MRSA By PCR ([facility]mp) [[id number]] Collected: 06/18/25 1456    Order Status: Completed Specimen: Respirate from Nares Updated: 06/18/25 1848     MRSA by PCR Negative    GRAM STAIN [[id number]] Collected: 06/18/25 1150    Order Status: Completed Specimen: Wound Updated:  06/18/25 1420     Significant Indicator .     Source WND     Site RIGHT LEG     Gram Stain Result No organisms seen.    CoV-2, FLU A/B, and RSV by PCR (2-4 Hours [facility]) : Collect NP swab in VTM [[id number]] Collected: 06/18/25 1150    Order Status: Completed Specimen: Respirate Updated: 06/18/25 1300     Influenza virus A RNA Negative     Influenza virus B, PCR Negative     RSV, PCR Negative     SARS-CoV-2 by PCR NotDetected     Comment: [facility] providers: PLEASE REFER TO DE-ESCALATION AND RETESTING PROTOCOL  on [email or web address]    **The [employer] GeneXpert Xpress SARS-CoV-2 RT-PCR Test has been made  available for use under the Emergency Use Authorization (EUA) only.          SARS-CoV-2 Source NP Swab    Urinalysis [[id number]]     Order Status: Sent Specimen: Urine     Urine Culture (New) [[id number]]     Order Status: Sent Specimen: Urine             Assessment:  Active Hospital Problems    Diagnosis     *Cellulitis [L03.90]     Pneumonia due to infectious organism [J18.9]     Hypertensive emergency [I16.1]     Atrial fibrillation ([facility]) [I48.91]     Bacteremia [R78.81]     Sepsis ([facility]) [A41.9]     ESRD (end stage renal disease) on dialysis ([facility]) [N18.6, Z99.2]     Type 2 diabetes mellitus with kidney complication, with long-term current use of insulin ([facility]) [E11.29, Z79.4]      54 y.o. male with a history of ESRD on hemodialysis, type 2 diabetes mellitus, history of group A streptococcus bacteremia in March secondary to cellulitis admitted on 6/18/2025 secondary to fever and malaise.  Patient now found to have MSSA bacteremia.  Source pneumonia versus skin    Pertinent diagnoses:  MSSA bacteremia  Acute hypoxic respiratory failure  Right lower extremity cellulitis  Pneumonia  Thrombocytopenia  ESRD on hemodialysis  Type 2 diabetes mellitus  Right TMA  Left BKA  History of group A streptococcus bacteremia in March 2025  History of left shoulder hardware in place    Plan:  -Continue IV cefazolin 1 g daily, renal  dosing  -Blood cultures on 6/18+ MSSA  -Follow repeat blood cultures on 6/20-negative to date  -Right lower extremity wound culture+ MSSA  -Follow TTE  -Follow MRI of the lumbar spine with contrast    This infection pose a threat to life    Disposition: TBD.  Will need to determine if patient will do outpatient hemodialysis versus hemodialysis 5 times a week    Need for tunneled catheter: TBD    Follow-up in the ID clinic with NP    Discussed with internal medicine/Dr. Patiño.  ID will continue to follow

## 2025-06-20 NOTE — PROGRESS NOTES
Monitor Summary   SR 75-99  Ectopy: PVC (R), Coup (R),44sec PSVT and 7beats of PSVT (MD Patiño aware)  .13/.08/.45

## 2025-06-20 NOTE — PROGRESS NOTES
Nephrology Daily Progress Note    Date of Service  6/20/2025    Chief Complaint  54 y.o. male with ESRD/HHD,admitted 6/18/2025 with sepsis    Interval Problem Update  6/20 -doing better, no acute events, no new complaints  HD scheduled for tomorrow  Review of Systems  Review of Systems   Constitutional:  Negative for chills, fever, malaise/fatigue and weight loss.   HENT:  Negative for congestion, hearing loss and sinus pain.    Eyes: Negative.    Respiratory:  Negative for cough, hemoptysis, shortness of breath and wheezing.    Cardiovascular:  Positive for leg swelling. Negative for chest pain, palpitations and orthopnea.   Gastrointestinal:  Negative for abdominal pain, nausea and vomiting.   Skin: Negative.    All other systems reviewed and are negative.       Physical Exam  Temp:  [36.5 °C (97.7 °F)-38.9 °C (102.1 °F)] 36.5 °C (97.7 °F)  Pulse:  [] 69  Resp:  [16-18] 17  BP: (139-174)/() 150/78  SpO2:  [94 %-98 %] 98 %    Physical Exam  Vitals reviewed.   Constitutional:       General: He is not in acute distress.     Appearance: Normal appearance. He is well-developed. He is not diaphoretic.   HENT:      Head: Normocephalic and atraumatic.      Nose: Nose normal.      Mouth/Throat:      Mouth: Mucous membranes are moist.      Pharynx: Oropharynx is clear.   Eyes:      Extraocular Movements: Extraocular movements intact.      Conjunctiva/sclera: Conjunctivae normal.      Pupils: Pupils are equal, round, and reactive to light.   Cardiovascular:      Rate and Rhythm: Normal rate and regular rhythm.      Pulses: Normal pulses.      Heart sounds: Normal heart sounds.   Pulmonary:      Effort: Pulmonary effort is normal. No respiratory distress.      Breath sounds: Normal breath sounds. No wheezing or rales.   Abdominal:      General: Bowel sounds are normal. There is no distension.      Palpations: Abdomen is soft. There is no mass.      Tenderness: There is no abdominal tenderness. There is no right  "CVA tenderness or left CVA tenderness.   Musculoskeletal:      Cervical back: Normal range of motion and neck supple.      Right lower leg: Edema present.      Left lower leg: Edema present.   Skin:     General: Skin is warm.      Coloration: Skin is not pale.      Findings: No erythema or rash.   Neurological:      General: No focal deficit present.      Mental Status: He is alert and oriented to person, place, and time.      Cranial Nerves: No cranial nerve deficit.      Coordination: Coordination normal.   Psychiatric:         Mood and Affect: Mood normal.         Behavior: Behavior normal.         Thought Content: Thought content normal.         Judgment: Judgment normal.         Fluids    Intake/Output Summary (Last 24 hours) at 6/20/2025 1356  Last data filed at 6/20/2025 0805  Gross per 24 hour   Intake 840 ml   Output 4000 ml   Net -3160 ml       Laboratory  Recent Labs     06/18/25  1150 06/19/25  0652 06/20/25  0330   WBC 6.7 5.5 4.7*   RBC 3.62* 3.33* 3.25*   HEMOGLOBIN 10.6* 9.7* 9.4*   HEMATOCRIT 31.6* 30.0* 29.0*   MCV 87.3 90.1 89.2   MCH 29.3 29.1 28.9   MCHC 33.5 32.3 32.4   RDW 44.8 46.9 46.8   PLATELETCT 120* 83* 95*   MPV 10.0 10.6 10.5     Recent Labs     06/18/25  1150 06/19/25  0652 06/20/25  0330   SODIUM 137 134* 137   POTASSIUM 3.4* 3.5* 3.5*   CHLORIDE 96 97 98   CO2 21 21 23   GLUCOSE 149* 112* 108*   BUN 59* 70* 45*   CREATININE 7.29* 8.29* 7.08*   CALCIUM 8.9 7.7* 8.3*     Recent Labs     06/18/25  1150   INR 1.27*     No results for input(s): \"NTPROBNP\" in the last 72 hours.        Imaging  DX-CHEST-PORTABLE (1 VIEW)   Final Result      Persistent right pleural effusion and right basilar atelectasis/infiltrate with some new opacity in the left midlung field laterally.      EC-ECHOCARDIOGRAM COMPLETE W/ CONT    (Results Pending)   MR-LUMBAR SPINE-WITH    (Results Pending)        Assessment/Plan       Expand All Collapse All    Nephrology Consultation     Date of Service  6/18/2025   "   Referring Physician  Nolan Yanes M.D.     Consulting Physician  Varun Ortiz M.D.     Reason for Consultation  Management of ESRD on Home HD        History of Presenting Illness  54 y.o. male with a history of hypertension, diabetes complicated by left BKA, ESRD on hemodialysis since March 2024, currently in week 3 of training for home hemodialysis who presented 6/18/2025 with fever.     This patient is well known to me from outpatient dialysis clinic.  He was previously dialyzing 3 times per week at the [facility].  However, he struggled with high intradialytic weight gains between dialysis treatments, especially over the weekends.  He was started on home hemodialysis training 6/2/2025, and has been doing well with home hemodialysis training.  He cannulates his own fistula with buttonhole needles.     Today, while undergoing home hemodialysis training and treatment, he started experiencing fever near the end of his treatment.  He only had 10 minutes remaining when he became very uncomfortable with fevers and chills, and treatment was stopped.  EMS was called and patient was transported to the hospital.  The patient's predialysis temperature was 99.5 °F and his post dialysis treatment temperature was 100.0 °F.     Patient tells me he felt unwell even prior to coming to dialysis today.  He has been complaining of dry cough.  During his dialysis treatment, he felt like he was shaking so hard that he had to come off of dialysis 10 minutes prior to the planned completion of treatment.     Review of Systems  Review of Systems   Constitutional:  Positive for chills and fever.   Respiratory:  Positive for cough. Negative for shortness of breath.    Cardiovascular:  Negative for chest pain.   Gastrointestinal:  Negative for abdominal pain.   All other systems reviewed and are negative.        Past Medical History  Past Medical History        Past Medical History:   Diagnosis Date    Anesthesia        Hypotension post-op, persisted for a few months x 1    Anesthesia 12/06/2023     Patient stated after 2016 colonoscopy he became hypotensive.    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    Heart burn       GERD; takes Prilosec    Hemorrhagic disorder ([facility])       ASA protection for stent and cardiac history    High cholesterol       medicated x 2    History of non-ST elevation myocardial infarction (NSTEMI)       Per Problem List    Hx of heart artery stent 2019    Hyperlipidemia      Hyperparathyroidism due to renal insufficiency ([facility])       Per Problem List    Hypertension       medicated    Moderate mitral regurgitation 04/01/2025    Myocardial infarct ([facility]) 2019     FOLLOWED BY DR TO    Paroxysmal SVT (supraventricular tachycardia) ([facility]) -on telemetry and during MACKENZIE atrial tachycardia likely 04/01/2025    Pneumonia       H/O    Renal disorder 12/06/2023     Stage 3 CKD; 12/9/2024 dialysis at [facility] on Wednesday's only via CVC    Shortness of breath 5/13/2025            Surgical History  [Past Surgical History]    [Past Surgical History]        Procedure Laterality Date    AV FISTULA REVISION Left 1/6/2025     Procedure: LEFT BRACHIOBASILIC FISTULA TRANSPOSITION;  Surgeon: Joyce Ureña M.D.;  Location: SURGERY SAME DAY [facility];  Service: General    AV FISTULA CREATION Left 11/04/2024     Procedure: CREATION OF LEFT UPPER EXTREMITY  DIALYSIS FISTULA;  Surgeon: Joyce Ureña M.D.;  Location: SURGERY SAME DAY [facility];  Service: General    ORIF, FRACTURE, HUMERUS, PROXIMAL Left 08/30/2024     Procedure: LEFT OPEN REDUCTION INTERNAL FIXATION  HUMERUS, PROXIMAL, NON UNION  REPAIR;  Surgeon: Aris Pierre M.D.;  Location: [facility];  Service: Orthopedics    KNEE AMPUTATION BELOW Left 02/01/2024     Procedure: AMPUTATION, BELOW KNEE;  Surgeon: Celestino Segura M.D.;  Location: [facility];  Service: Orthopedics    TOE AMPUTATION Left 01/30/2024     Procedure: AMPUTATION, TOE- 5TH;  Surgeon: Celestino Segura M.D.;  Location: [facility];  Service: Orthopedics    PB OPEN TREATMENT PBOX HUMERAL FRACTURE Left 12/11/2023     Procedure: LEFT OPEN REDUCTION INTERNAL FIXATION  HUMERUS, PROXIMAL AND LEFT BICEPS TENODESIS;  Surgeon: Aris Pierre M.D.;  Location: [facility];  Service: Orthopedics    PB REPAIR BICEPS LONG TENDON Left 12/11/2023     Procedure: TENODESIS, BICEPS, OPEN;  Surgeon: Aris Pierre M.D.;  Location: [facility];  Service: Orthopedics    CATARACT EXTRACTION WITH IOL Bilateral 2023    TOE AMPUTATION Left 09/30/2021     Procedure: AMPUTATION, TOE;  Surgeon: Tomer Hart M.D.;  Location: [facility];  Service: Orthopedics    STENT PLACEMENT   2019     STEMI with nonobstructive LAD    TOE AMPUTATION Right 07/02/2018     Procedure: Transmetatarsal amputation;  Surgeon: Ravi Bernardo M.D.;  Location: [facility];  Service: Orthopedics    ACHILLES TENDON REPAIR Right 07/02/2018     Procedure: ACHILLES LENGTHENING;  Surgeon: Ravi Bernardo M.D.;  Location: [facility];  Service: Orthopedics    IRRIGATION & DEBRIDEMENT ORTHO Right 07/02/2018     Procedure: IRRIGATION & DEBRIDEMENT ORTHO;  Surgeon: Ravi Bernrado M.D.;  Location: [facility];  Service: Orthopedics    OTHER CARDIAC SURGERY   09/19/2019     Stent installed right side    OTHER ORTHOPEDIC SURGERY        SHOULDER SURGERY   12/11/2023     Something is not right in my shoulder/arm    TONSILLECTOMY        VASECTOMY            Family History  Family History         Family History   Problem Relation Age of Onset     No Known Problems Mother      Diabetes Father           Adult onset    Heart Disease Father      Hypertension Father      Diabetes Maternal Grandmother      Heart Disease Maternal Grandfather      Lung Disease Paternal Grandmother      Cancer Paternal Grandmother      Cancer Paternal Grandfather      Lung Cancer Paternal Grandfather      Kidney Disease Neg Hx              Social History  Social History               Socioeconomic History    Marital status:        Spouse name: Not on file    Number of children: Not on file    Years of education: Not on file    Highest education level: Some college, no degree   Occupational History    Not on file   Tobacco Use    Smoking status: Former       Current packs/day: 0.00       Average packs/day: 0.6 packs/day for 17.0 years (10.0 ttl pk-yrs)       Types: Cigarettes       Start date: 1988       Quit date:        Years since quittin.4       Passive exposure: Never    Smokeless tobacco: Never   Vaping Use    Vaping status: Never Used   Substance and Sexual Activity    Alcohol use: Not Currently       Comment: Quit     Drug use: Not Currently       Types: Oral       Comment: NOT CURRENTLY, HISTORY OF CBD    Sexual activity: Not Currently       Partners: Female   Other Topics Concern    Not on file   Social History Narrative    Not on file      Social Drivers of Health           Financial Resource Strain: Low Risk  (2022)     Overall Financial Resource Strain (CARDIA)      Difficulty of Paying Living Expenses: Not hard at all   Food Insecurity: No Food Insecurity (3/28/2025)     Hunger Vital Sign      Worried About Running Out of Food in the Last Year: Never true      Ran Out of Food in the Last Year: Never true   Transportation Needs: No Transportation Needs (3/28/2025)     PRAPARE - Transportation      Lack of Transportation (Medical): No      Lack of Transportation (Non-Medical): No   Physical Activity: Insufficiently Active (2022)      Exercise Vital Sign      Days of Exercise per Week: 2 days      Minutes of Exercise per Session: 40 min   Stress: No Stress Concern Present (12/13/2022)     [place] [employer] - Occupational Stress Questionnaire      Feeling of Stress : Not at all   Social Connections: Moderately Integrated (12/13/2022)     Social Connection and Isolation Panel [NHANES]      Frequency of Communication with Friends and Family: More than three times a week      Frequency of Social Gatherings with Friends and Family: More than three times a week      Attends Faith Services: Never      Active Member of Clubs or Organizations: Yes      Attends Club or Organization Meetings: Never      Marital Status:    Intimate Partner Violence: Not At Risk (3/28/2025)     Humiliation, Afraid, Rape, and Kick questionnaire      Fear of Current or Ex-Partner: No      Emotionally Abused: No      Physically Abused: No      Sexually Abused: No   Housing Stability: Low Risk  (3/28/2025)     Housing Stability Vital Sign      Unable to Pay for Housing in the Last Year: No      Number of Times Moved in the Last Year: 0      Homeless in the Last Year: No            Medications  [Current Medications]    [Current Medications]            Current Facility-Administered Medications   Medication Dose Route Frequency Provider Last Rate Last Admin    azithromycin (Zithromax) injection 500 mg  500 mg Intravenous Once LISA MunizOAsiya        ampicillin/sulbactam (Unasyn) 3 g in  mL IVPB  3 g Intravenous Once LISA MunizOAsiya 200 mL/hr at 06/18/25 1318 3 g at 06/18/25 1318             Current Outpatient Medications   Medication Sig Dispense Refill    atorvastatin (LIPITOR) 80 MG tablet Take 40 mg by mouth every evening. 40 mg = 1/2 tab        insulin regular (HUMULIN R/NOVOLIN R) 100 UNIT/ML Solution Inject 6-12 Units under the skin with meals as needed for High Blood Sugar.        Insulin Degludec (TRESIBA SC)  Inject 20 Units under the skin every evening.        atorvastatin (LIPITOR) 40 MG Tab Take 1 Tablet by mouth every evening. 90 Tablet 3    amLODIPine (NORVASC) 2.5 MG Tab Take 3 Tablets by mouth every day. 100 Tablet 3    cholestyramine (QUESTRAN) 4 g packet Take 4 g by mouth 2 times a day for 360 days. 60 Each 11    torsemide (DEMADEX) 100 MG Tab Take 1 Tablet by mouth every day. 90 Tablet 3    losartan (COZAAR) 100 MG Tab Take 1 Tablet by mouth every day. 90 Tablet 3    metOLazone (ZAROXOLYN) 5 MG Tab Take 1 Tablet by mouth every day. 90 Tablet 3    aspirin 81 MG EC tablet Take 1 Tablet by mouth every day. 30 Tablet 2    omeprazole (PRILOSEC) 20 MG delayed-release capsule Take 20 mg by mouth every morning.            azithromycin (ZITHROMAX) 250 MG Tab 2 tablets today then 1 tablet each day for the next 4 more days 6 Tablet 0    insulin glargine (LANTUS SOLOSTAR) 100 UNIT/ML Solution Pen-injector injection Inject 24 Units under the skin every evening. (Patient not taking: Reported on 6/18/2025) 9 mL 0        Allergies  [Allergies]    [Allergies]  No Known Allergies     Physical Exam  Temp:  [38.7 °C (101.6 °F)-38.9 °C (102 °F)] 38.7 °C (101.6 °F)  Pulse:  [] 101  Resp:  [17-22] 17  BP: 150/78  SpO2:  [90 %-99 %] 97 %     Physical Exam  Constitutional:       General: He is not in acute distress.     Appearance: He is well-developed. He is ill-appearing. He is not diaphoretic.   HENT:      Head: Normocephalic and atraumatic.      Mouth/Throat:      Mouth: Mucous membranes are moist.      Pharynx: Oropharynx is clear. No oropharyngeal exudate.   Eyes:      General: No scleral icterus.     Extraocular Movements: Extraocular movements intact.   Neck:      Trachea: No tracheal deviation.   Cardiovascular:      Rate and Rhythm: Normal rate.      Heart sounds: Normal heart sounds. No murmur heard.  Pulmonary:      Effort: Pulmonary effort is normal.      Breath sounds: Normal breath sounds. No stridor. No rales.    Abdominal:      General: There is no distension.      Palpations: Abdomen is soft.      Tenderness: There is no abdominal tenderness.   Musculoskeletal:         General: Deformity (Left BKA noted) present. Normal range of motion.      Right lower leg: Edema (1+) present.      Left lower leg: No edema.   Skin:     General: Skin is warm and dry.   Neurological:      General: No focal deficit present.      Mental Status: He is alert and oriented to person, place, and time.   Psychiatric:         Mood and Affect: Mood normal.         Behavior: Behavior normal.      Dialysis access: Left arm AV fistula, patent bruit and thrill     Fluids        Laboratory  Labs reviewed, pertinent labs below.      Recent Labs     06/18/25  1150   WBC 6.7   RBC 3.62*   HEMOGLOBIN 10.6*   HEMATOCRIT 31.6*   MCV 87.3   MCH 29.3   MCHC 33.5   RDW 44.8   PLATELETCT 120*   MPV 10.0          Recent Labs     06/18/25  1150   SODIUM 137   POTASSIUM 3.4*   CHLORIDE 96   CO2 21   GLUCOSE 149*   BUN 59*   CREATININE 7.29*   CALCIUM 8.9          Recent Labs     06/18/25  1150   INR 1.27*             URINALYSIS:        Lab Results   Component Value Date/Time     COLORURINE Yellow 03/30/2025 0532     CLARITY Clear 03/30/2025 0532     SPECGRAVITY 1.018 03/30/2025 0532     PHURINE 8.0 03/30/2025 0532     KETONES Negative 03/30/2025 0532     PROTEINURIN >=1000 (A) 03/30/2025 0532     BILIRUBINUR Negative 03/30/2025 0532     UROBILU 0.2 03/30/2025 0532     NITRITE Negative 03/30/2025 0532     LEUKESTERAS Negative 03/30/2025 0532     OCCULTBLOOD Small (A) 03/30/2025 0532      UPC        Lab Results   Component Value Date/Time     TOTPROTUR 258.0 (H) 05/03/2023 1210            Lab Results   Component Value Date/Time     CREATININEU 27.57 05/03/2023 1210                   Assessment/Plan  54 y.o. male with a history of hypertension, diabetes complicated by left BKA, ESRD on hemodialysis admitted with MSSA bacteremia    1.ESRD/HD -will dialyze tomorrow, then  continue MWFS  2.HTN: elevated BP improving -to increase UF with HD as tolerates  3.Electrolytes : well controlled  4.Anemia: Hb level at goal  5.Volume overloaded UF with HD as tolerates  6. MSSA bacteremia on Ancef     Recs:   HD tomorrow  Renal diet  All meds to renal doses  CBC, BMP on HD days  Will follow

## 2025-06-20 NOTE — Clinical Note
REFERRAL APPROVAL NOTICE         Sent on June 20, 2025                   Beni Moore  [address] [facility] [place] [address]                   Dear Mr. Moore,    After a careful review of the medical information and benefit coverage, [facility] has processed your referral. See below for additional details.    If applicable, you must be actively enrolled with your insurance for coverage of the authorized service. If you have any questions regarding your coverage, please contact your insurance directly.    REFERRAL INFORMATION   Referral #:  [id number]  Referred-To Department    Referred-By Provider:  Infectious Diseases    Ebony Crawford M.D.   Id c      [address] [facility] [address]  [place] [address]  [phone] [address] [facility] [address]  [place] [id number] [phone]    Referral Start Date:  06/20/2025  Referral End Date:   06/20/2026             SCHEDULING  If you do not already have an appointment, please call [phone] to make an appointment.     MORE INFORMATION  If you do not already have a [employer] account, sign up at: [email or web address]  You can access your medical information, make appointments, see lab results, billing information, and more.  If you have questions regarding this referral, please contact  the [facility] Referrals department at:             [phone]. Monday - Friday 8:00AM - 5:00PM.     Sincerely,    [facility]

## 2025-06-20 NOTE — CARE PLAN
The patient is Stable - Low risk of patient condition declining or worsening    Shift Goals  Clinical Goals: Pain management, monitor vitals and labs, IV abx  Patient Goals: Pain control, comfort  Family Goals: JENNIFER    Progress made toward(s) clinical / shift goals:    Problem: Knowledge Deficit - Standard  Goal: Patient and family/care givers will demonstrate understanding of plan of care, disease process/condition, diagnostic tests and medications  Outcome: Progressing     Problem: Hemodynamics  Goal: Patient's hemodynamics, fluid balance and neurologic status will be stable or improve  Outcome: Progressing     Problem: Fluid Volume  Goal: Fluid volume balance will be maintained  Outcome: Progressing     Problem: Skin Integrity  Goal: Skin integrity is maintained or improved  Outcome: Progressing     Problem: Fall Risk  Goal: Patient will remain free from falls  Outcome: Progressing     Problem: Pain - Standard  Goal: Alleviation of pain or a reduction in pain to the patient’s comfort goal  Outcome: Progressing       Patient is not progressing towards the following goals:

## 2025-06-20 NOTE — PROGRESS NOTES
Bedside report received and patient care assumed. Pt is resting in bed, A&O4, with 7/10 pain patient medicated see MAR, and is on 1 LPM nasal cannula. Tele box on. All fall precautions are in place, belongings at bedside table.  Pt was updated on POC, no questions or concerns. Pt educated on use of call light for assistance.

## 2025-06-20 NOTE — PROGRESS NOTES
[facility] Medicine Daily Progress Note    Date of Service  6/20/2025    Chief Complaint  Beni Moore is a 54 y.o. male admitted 6/18/2025 with fever.    Hospital Course    Beni Moore is a 54 y.o. male who presented 6/18/2025 with fever.  Very pleasant man with history of ESRD on HD, DM 2, prior alcohol and tobacco use in remission, hypertension.  He was at dialysis today and began feeling ill fairly quickly there during the session.  He was found to be febrile and was referred to the ED.  Initially was felt that he had right lower extremity cellulitis and was started on cefazolin however subsequently chest x-ray also revealed infiltrates and patient did report feeling like he was developing a cough so antibiotics were expanded to treat pneumonia with Unasyn and azithromycin.  He was febrile in the ED and had elevated lactic acid of 2.2, tachycardic.  EKG showed possible A-fib, awaiting repeat EKG at this time.     Interval Problem Update  No acute events overnight.  Patient with 102.1 fever yesterday 6pm.  Continue antibiotics, supportive care.  Wound culture growing MSSA, likely source of bacteremia MSSA infection.  ID following.  Echo and MRI L spine pending to evaluate for infection.  Patient feels better today than yesterday, has no complaints.  TSH normal. Will need to start anticoagulation for a fib stroke risk when plan of care is determined, does not need any procedures.  Not needing any lantus, stop lantus and continue ISS.  Discussed with ID and nephrology, cannot do home IV antibiotics with home dialysis, patient is rethinking home dialysis and would like to get antibiotics done at dialysis center.      I have discussed this patient's plan of care and discharge plan at IDT rounds today with Case Management, Nursing, Nursing leadership, and other members of the IDT team.    Consultants/Specialty  infectious disease and nephrology    Code Status  Full Code    Disposition  Medically Cleared  I have  placed the appropriate orders for post-discharge needs.    Review of Systems  Review of Systems   Constitutional:  Positive for fever. Negative for chills.   Eyes:  Negative for blurred vision and pain.   Respiratory:  Negative for cough and shortness of breath.    Cardiovascular:  Negative for chest pain, palpitations and leg swelling.   Gastrointestinal:  Negative for abdominal pain, nausea and vomiting.   Genitourinary:  Negative for dysuria and urgency.   Musculoskeletal:  Negative for back pain and falls.   Skin:  Negative for itching and rash.   Neurological:  Positive for tingling. Negative for dizziness, sensory change, focal weakness, loss of consciousness and headaches.   Psychiatric/Behavioral:  Negative for substance abuse. The patient does not have insomnia.         Physical Exam  Temp:  [36.5 °C (97.7 °F)-38.9 °C (102.1 °F)] 36.5 °C (97.7 °F)  Pulse:  [] 69  Resp:  [16-18] 17  BP: (139-174)/() 150/78  SpO2:  [94 %-98 %] 98 %    Physical Exam  Constitutional:       General: He is not in acute distress.     Appearance: He is not ill-appearing.   HENT:      Head: Normocephalic and atraumatic.      Right Ear: External ear normal.      Left Ear: External ear normal.      Mouth/Throat:      Pharynx: No oropharyngeal exudate or posterior oropharyngeal erythema.   Eyes:      Extraocular Movements: Extraocular movements intact.      Pupils: Pupils are equal, round, and reactive to light.   Cardiovascular:      Rate and Rhythm: Normal rate and regular rhythm.      Pulses: Normal pulses.      Heart sounds: Normal heart sounds.   Pulmonary:      Effort: Pulmonary effort is normal. No respiratory distress.      Breath sounds: Normal breath sounds. No wheezing or rales.   Abdominal:      General: Bowel sounds are normal. There is no distension.      Palpations: Abdomen is soft.      Tenderness: There is no abdominal tenderness.   Musculoskeletal:         General: Signs of injury present. No swelling or  tenderness. Normal range of motion.      Cervical back: Normal range of motion and neck supple.      Right lower leg: No edema.      Left lower leg: No edema.      Comments: Left BKA, R TMA; right anterior shin wound with surrounding erythema   Skin:     General: Skin is warm and dry.   Neurological:      General: No focal deficit present.      Mental Status: He is oriented to person, place, and time.      Cranial Nerves: No cranial nerve deficit.      Motor: No weakness.   Psychiatric:         Mood and Affect: Mood normal.         Behavior: Behavior normal.         Fluids    Intake/Output Summary (Last 24 hours) at 6/20/2025 1149  Last data filed at 6/20/2025 0805  Gross per 24 hour   Intake 840 ml   Output 4000 ml   Net -3160 ml        Laboratory  Recent Labs     06/18/25  1150 06/19/25  0652 06/20/25  0330   WBC 6.7 5.5 4.7*   RBC 3.62* 3.33* 3.25*   HEMOGLOBIN 10.6* 9.7* 9.4*   HEMATOCRIT 31.6* 30.0* 29.0*   MCV 87.3 90.1 89.2   MCH 29.3 29.1 28.9   MCHC 33.5 32.3 32.4   RDW 44.8 46.9 46.8   PLATELETCT 120* 83* 95*   MPV 10.0 10.6 10.5     Recent Labs     06/18/25  1150 06/19/25  0652 06/20/25  0330   SODIUM 137 134* 137   POTASSIUM 3.4* 3.5* 3.5*   CHLORIDE 96 97 98   CO2 21 21 23   GLUCOSE 149* 112* 108*   BUN 59* 70* 45*   CREATININE 7.29* 8.29* 7.08*   CALCIUM 8.9 7.7* 8.3*     Recent Labs     06/18/25  1150   INR 1.27*               Imaging  DX-CHEST-PORTABLE (1 VIEW)   Final Result      Persistent right pleural effusion and right basilar atelectasis/infiltrate with some new opacity in the left midlung field laterally.      EC-ECHOCARDIOGRAM COMPLETE W/ CONT    (Results Pending)   MR-LUMBAR SPINE-WITH    (Results Pending)        Assessment/Plan  * Cellulitis- (present on admission)  Assessment & Plan  Right leg appears red and warm however difficult to distinguish this from his baseline venous stasis, left leg feels warm as well and patient is febrile so the warmth may be due to his general fever.    Continue current antibiotics, ancef  Follow cultures  ID following    Atrial fibrillation (HCC)  Assessment & Plan  New diagnosis  Echo pending  TSH normal  Anticoagulation when no procedures planned given elevated CHADSVASc score  Currently rate controlled without additional medications  Will monitor on telemetry  Optimize electrolytes    Hypertensive emergency  Assessment & Plan  Blood pressure up to 210/110  Asymptomatic  I have increased his amlodipine and continue losartan as well as ordered IV antihypertensives, continue to monitor and titrate medications appropriately  improved    Pneumonia due to infectious organism  Assessment & Plan  Infiltrates on chest x-ray and patient has started to develop cough, has fever and elevated procalcitonin  ID consulted  Will continue these antibiotics  Blood culture growing MSSA, repeat blood cultures pending    MSSA bacteremia- (present on admission)  Assessment & Plan  Blood culture with MSSA  Hx of strep bacteremia in April, completed antibiotics then  Echo pending  MRI lumbar spine to rule out infection given reported back pain  ID following  Continue ancef    ESRD (end stage renal disease) on dialysis ([facility])- (present on admission)  Assessment & Plan  Cannot tolerate dialysis session due to feeling ill and febrile and was referred to ED  He follows with Dr. Ortiz and I have consulted him, nephrology will follow for HD needs and other recommendations, appreciated    Sepsis ([facility])- (present on admission)  Assessment & Plan  This is Sepsis Present on admission  SIRS criteria identified on my evaluation include: Fever, with temperature greater than 100.9 deg F and Tachycardia, with heart rate greater than 90 BPM  Clinical indicators of end organ dysfunction include Lactic Acid greater than 2  Source is pneumonia and/or cellulitis  Sepsis protocol initiated  Crystalloid Fluid Administration: Resuscitation volume of per EDP ordered. Reason that resuscitation volume of less  than 30ml/kg was ordered concern for causing harm given ESRD  IV antibiotics as appropriate for source of sepsis  Reassessment: I have reassessed the patient's hemodynamic status    Type 2 diabetes mellitus with kidney complication, with long-term current use of insulin (HCC)- (present on admission)  Assessment & Plan  Stop lantus as not needing much insulin, continue ISS         VTE prophylaxis: VTE Selection    I have performed a physical exam and reviewed and updated ROS and Plan today (6/20/2025). In review of yesterday's note (6/19/2025), there are no changes except as documented above.

## 2025-06-20 NOTE — CARE PLAN
The patient is Stable - Low risk of patient condition declining or worsening    Shift Goals  Clinical Goals: Safety, IV antibiotics, Monitor vitals, MRI screening  Patient Goals: Sleep  Family Goals: JENNIFER    Progress made toward(s) clinical / shift goals:        Problem: Knowledge Deficit - Standard  Goal: Patient and family/care givers will demonstrate understanding of plan of care, disease process/condition, diagnostic tests and medications  Outcome: Progressing     Problem: Hemodynamics  Goal: Patient's hemodynamics, fluid balance and neurologic status will be stable or improve  Outcome: Progressing     Problem: Fluid Volume  Goal: Fluid volume balance will be maintained  Outcome: Progressing     Problem: Respiratory  Goal: Patient will achieve/maintain optimum respiratory ventilation and gas exchange  Outcome: Progressing     Problem: Skin Integrity  Goal: Skin integrity is maintained or improved  Outcome: Progressing     Problem: Fall Risk  Goal: Patient will remain free from falls  Outcome: Progressing       Patient is not progressing towards the following goals:

## 2025-06-20 NOTE — CARE PLAN
The patient is Stable - Low risk of patient condition declining or worsening    Shift Goals  Clinical Goals: IV abx, dialysis today, monitor labs and vitals  Patient Goals: Comfort, dialysis today  Family Goals: JENNIFER    Progress made toward(s) clinical / shift goals:    Problem: Knowledge Deficit - Standard  Goal: Patient and family/care givers will demonstrate understanding of plan of care, disease process/condition, diagnostic tests and medications  Outcome: Progressing     Problem: Hemodynamics  Goal: Patient's hemodynamics, fluid balance and neurologic status will be stable or improve  Outcome: Progressing     Problem: Fluid Volume  Goal: Fluid volume balance will be maintained  Outcome: Progressing     Problem: Skin Integrity  Goal: Skin integrity is maintained or improved  Outcome: Progressing     Problem: Fall Risk  Goal: Patient will remain free from falls  Outcome: Progressing       Patient is not progressing towards the following goals:

## 2025-06-21 LAB
ALBUMIN SERPL BCP-MCNC: 3.3 G/DL (ref 3.2–4.9)
ANION GAP SERPL CALC-SCNC: 14 MMOL/L (ref 7–16)
BACTERIA BLD CULT: ABNORMAL
BUN SERPL-MCNC: 56 MG/DL (ref 8–22)
CALCIUM ALBUM COR SERPL-MCNC: 8.5 MG/DL (ref 8.5–10.5)
CALCIUM SERPL-MCNC: 7.9 MG/DL (ref 8.5–10.5)
CHLORIDE SERPL-SCNC: 95 MMOL/L (ref 96–112)
CO2 SERPL-SCNC: 21 MMOL/L (ref 20–33)
CREAT SERPL-MCNC: 8.32 MG/DL (ref 0.5–1.4)
ERYTHROCYTE [DISTWIDTH] IN BLOOD BY AUTOMATED COUNT: 48.2 FL (ref 35.9–50)
GFR SERPLBLD CREATININE-BSD FMLA CKD-EPI: 7 ML/MIN/1.73 M 2
GLUCOSE BLD STRIP.AUTO-MCNC: 114 MG/DL (ref 65–99)
GLUCOSE BLD STRIP.AUTO-MCNC: 140 MG/DL (ref 65–99)
GLUCOSE BLD STRIP.AUTO-MCNC: 176 MG/DL (ref 65–99)
GLUCOSE BLD STRIP.AUTO-MCNC: 190 MG/DL (ref 65–99)
GLUCOSE SERPL-MCNC: 124 MG/DL (ref 65–99)
HCT VFR BLD AUTO: 29 % (ref 42–52)
HGB BLD-MCNC: 9.1 G/DL (ref 14–18)
MCH RBC QN AUTO: 28.9 PG (ref 27–33)
MCHC RBC AUTO-ENTMCNC: 31.4 G/DL (ref 32.3–36.5)
MCV RBC AUTO: 92.1 FL (ref 81.4–97.8)
PHOSPHATE SERPL-MCNC: 6 MG/DL (ref 2.5–4.5)
PLATELET # BLD AUTO: 93 K/UL (ref 164–446)
PLATELETS.RETICULATED NFR BLD AUTO: 3.2 % (ref 0.6–13.1)
PMV BLD AUTO: 10.3 FL (ref 9–12.9)
POTASSIUM SERPL-SCNC: 4.1 MMOL/L (ref 3.6–5.5)
RBC # BLD AUTO: 3.15 M/UL (ref 4.7–6.1)
SIGNIFICANT IND 70042: ABNORMAL
SIGNIFICANT IND 70042: ABNORMAL
SITE SITE: ABNORMAL
SITE SITE: ABNORMAL
SODIUM SERPL-SCNC: 130 MMOL/L (ref 135–145)
SOURCE SOURCE: ABNORMAL
SOURCE SOURCE: ABNORMAL
WBC # BLD AUTO: 4.8 K/UL (ref 4.8–10.8)

## 2025-06-21 PROCEDURE — 700102 HCHG RX REV CODE 250 W/ 637 OVERRIDE(OP): Performed by: STUDENT IN AN ORGANIZED HEALTH CARE EDUCATION/TRAINING PROGRAM

## 2025-06-21 PROCEDURE — 85027 COMPLETE CBC AUTOMATED: CPT

## 2025-06-21 PROCEDURE — 700111 HCHG RX REV CODE 636 W/ 250 OVERRIDE (IP): Mod: JZ | Performed by: INTERNAL MEDICINE

## 2025-06-21 PROCEDURE — 700111 HCHG RX REV CODE 636 W/ 250 OVERRIDE (IP): Performed by: STUDENT IN AN ORGANIZED HEALTH CARE EDUCATION/TRAINING PROGRAM

## 2025-06-21 PROCEDURE — 770020 HCHG ROOM/CARE - TELE (206)

## 2025-06-21 PROCEDURE — 90935 HEMODIALYSIS ONE EVALUATION: CPT

## 2025-06-21 PROCEDURE — A9270 NON-COVERED ITEM OR SERVICE: HCPCS | Performed by: STUDENT IN AN ORGANIZED HEALTH CARE EDUCATION/TRAINING PROGRAM

## 2025-06-21 PROCEDURE — 85055 RETICULATED PLATELET ASSAY: CPT

## 2025-06-21 PROCEDURE — 99232 SBSQ HOSP IP/OBS MODERATE 35: CPT | Performed by: STUDENT IN AN ORGANIZED HEALTH CARE EDUCATION/TRAINING PROGRAM

## 2025-06-21 PROCEDURE — 99233 SBSQ HOSP IP/OBS HIGH 50: CPT | Performed by: INTERNAL MEDICINE

## 2025-06-21 PROCEDURE — 90935 HEMODIALYSIS ONE EVALUATION: CPT | Performed by: INTERNAL MEDICINE

## 2025-06-21 PROCEDURE — 80069 RENAL FUNCTION PANEL: CPT

## 2025-06-21 PROCEDURE — 36415 COLL VENOUS BLD VENIPUNCTURE: CPT

## 2025-06-21 PROCEDURE — 82962 GLUCOSE BLOOD TEST: CPT | Performed by: STUDENT IN AN ORGANIZED HEALTH CARE EDUCATION/TRAINING PROGRAM

## 2025-06-21 RX ADMIN — OMEPRAZOLE 20 MG: 20 CAPSULE, DELAYED RELEASE ORAL at 06:14

## 2025-06-21 RX ADMIN — OXYCODONE HYDROCHLORIDE 10 MG: 10 TABLET ORAL at 21:24

## 2025-06-21 RX ADMIN — HEPARIN SODIUM 5000 UNITS: 5000 INJECTION, SOLUTION INTRAVENOUS; SUBCUTANEOUS at 18:13

## 2025-06-21 RX ADMIN — ACETAMINOPHEN 650 MG: 325 TABLET ORAL at 11:42

## 2025-06-21 RX ADMIN — OXYCODONE 5 MG: 5 TABLET ORAL at 01:30

## 2025-06-21 RX ADMIN — TORSEMIDE 100 MG: 100 TABLET ORAL at 06:14

## 2025-06-21 RX ADMIN — METOLAZONE 5 MG: 5 TABLET ORAL at 06:15

## 2025-06-21 RX ADMIN — INSULIN LISPRO 1 UNITS: 100 INJECTION, SOLUTION INTRAVENOUS; SUBCUTANEOUS at 21:30

## 2025-06-21 RX ADMIN — ACETAMINOPHEN 650 MG: 325 TABLET ORAL at 06:14

## 2025-06-21 RX ADMIN — INSULIN LISPRO 1 UNITS: 100 INJECTION, SOLUTION INTRAVENOUS; SUBCUTANEOUS at 16:51

## 2025-06-21 RX ADMIN — ACETAMINOPHEN 650 MG: 325 TABLET ORAL at 01:30

## 2025-06-21 RX ADMIN — CHOLESTYRAMINE 4 G: 4 POWDER, FOR SUSPENSION ORAL at 16:56

## 2025-06-21 RX ADMIN — AMLODIPINE BESYLATE 10 MG: 10 TABLET ORAL at 06:14

## 2025-06-21 RX ADMIN — OXYCODONE HYDROCHLORIDE 10 MG: 10 TABLET ORAL at 06:23

## 2025-06-21 RX ADMIN — ASPIRIN 81 MG: 81 TABLET, COATED ORAL at 06:15

## 2025-06-21 RX ADMIN — ATORVASTATIN CALCIUM 40 MG: 40 TABLET, FILM COATED ORAL at 21:25

## 2025-06-21 RX ADMIN — HEPARIN SODIUM 5000 UNITS: 5000 INJECTION, SOLUTION INTRAVENOUS; SUBCUTANEOUS at 06:15

## 2025-06-21 RX ADMIN — ACETAMINOPHEN 650 MG: 325 TABLET ORAL at 18:13

## 2025-06-21 RX ADMIN — CHOLESTYRAMINE 4 G: 4 POWDER, FOR SUSPENSION ORAL at 10:01

## 2025-06-21 RX ADMIN — LOSARTAN POTASSIUM 100 MG: 50 TABLET, FILM COATED ORAL at 06:14

## 2025-06-21 RX ADMIN — CEFAZOLIN SODIUM 1 G: 1 INJECTION, SOLUTION INTRAVENOUS at 14:51

## 2025-06-21 ASSESSMENT — PAIN DESCRIPTION - PAIN TYPE
TYPE: CHRONIC PAIN
TYPE: ACUTE PAIN

## 2025-06-21 ASSESSMENT — ENCOUNTER SYMPTOMS
COUGH: 0
CHILLS: 0
BACK PAIN: 0
TINGLING: 1
FEVER: 1
HEADACHES: 0
INSOMNIA: 0
PALPITATIONS: 0
SHORTNESS OF BREATH: 0
BLURRED VISION: 0
SENSORY CHANGE: 0
FOCAL WEAKNESS: 0
NAUSEA: 0
DIZZINESS: 0
ABDOMINAL PAIN: 0
EYE PAIN: 0
VOMITING: 0
LOSS OF CONSCIOUSNESS: 0
FALLS: 0

## 2025-06-21 ASSESSMENT — LIFESTYLE VARIABLES: SUBSTANCE_ABUSE: 0

## 2025-06-21 ASSESSMENT — FIBROSIS 4 INDEX: FIB4 SCORE: 3.34

## 2025-06-21 NOTE — PROGRESS NOTES
Received report from RN. Assumed pt care. Patient on tele box, on 1L NC, and spO2 94%. Patient A&O x4. Fall precautions in place, call light and belongings within reach, bed in lowest position. No signs of distress.

## 2025-06-21 NOTE — PROCEDURES
Nephrology/Hemodialysis note    Patient with ESRD/HD admitted with sepsis  Seen and examined during dialysis  Tolerates well  VS stable  UF 3-4 L  Lab results reviewed  Please see dialysis flow sheet for details

## 2025-06-21 NOTE — THERAPY
Occupational Therapy Contact Note    Patient Name: Beni Moore  Age:  54 y.o., Sex:  male  Medical Record #: [id number]  Today's Date: 6/21/2025 06/21/25 7503   Initial Contact Note    Initial Contact Note Order Received and Verified. Occupational Therapy Evaluation NOT Completed Because Patient Does Not Require Acute Occupational Therapy at this Time.   Interdisciplinary Plan of Care Collaboration   Collaboration Comments OT eval received, discussed with physical therapist who reported pt declining therapy needs and is independent with mobility/self cares, will sign off, may re order with change in status

## 2025-06-21 NOTE — THERAPY
Physical Therapy Contact Note    Patient Name: Beni Moore  Age:  54 y.o., Sex:  male  Medical Record #: [id number]  Today's Date: 6/21/2025    PT eval order received. PT arrived to pt room for PT eval, and pt declining PT services, stating he is able to do all of his transfers and mobility incluidng to and from W/C without concern, and that he is confident in his ability to perform them upon D/C. PT to discontinue order per pt request at this time.

## 2025-06-21 NOTE — PROGRESS NOTES
[facility] Medicine Daily Progress Note    Date of Service  6/21/2025    Chief Complaint  Beni Moore is a 54 y.o. male admitted 6/18/2025 with fever.    Hospital Course    Beni Moore is a 54 y.o. male who presented 6/18/2025 with fever.  Very pleasant man with history of ESRD on HD, DM 2, prior alcohol and tobacco use in remission, hypertension.  He was at dialysis today and began feeling ill fairly quickly there during the session.  He was found to be febrile and was referred to the ED.  Initially was felt that he had right lower extremity cellulitis and was started on cefazolin however subsequently chest x-ray also revealed infiltrates and patient did report feeling like he was developing a cough so antibiotics were expanded to treat pneumonia with Unasyn and azithromycin.  He was febrile in the ED and had elevated lactic acid of 2.2, tachycardic.  EKG showed possible A-fib, awaiting repeat EKG at this time.     Interval Problem Update  No acute events overnight.  Patient remains afebrile.  Tolerating dialysis well, continue per nephrology team.  Continue ancef for MSSA bacteremia.  Echo and MRI L spine pending.        I have discussed this patient's plan of care and discharge plan at IDT rounds today with Case Management, Nursing, Nursing leadership, and other members of the IDT team.    Consultants/Specialty  infectious disease and nephrology    Code Status  Full Code    Disposition  Medically Cleared  I have placed the appropriate orders for post-discharge needs.    Review of Systems  Review of Systems   Constitutional:  Positive for fever. Negative for chills.   Eyes:  Negative for blurred vision and pain.   Respiratory:  Negative for cough and shortness of breath.    Cardiovascular:  Negative for chest pain, palpitations and leg swelling.   Gastrointestinal:  Negative for abdominal pain, nausea and vomiting.   Genitourinary:  Negative for dysuria and urgency.   Musculoskeletal:  Negative for back pain  and falls.   Skin:  Negative for itching and rash.   Neurological:  Positive for tingling. Negative for dizziness, sensory change, focal weakness, loss of consciousness and headaches.   Psychiatric/Behavioral:  Negative for substance abuse. The patient does not have insomnia.         Physical Exam  Temp:  [36.2 °C (97.2 °F)-36.7 °C (98.1 °F)] 36.2 °C (97.2 °F)  Pulse:  [67-80] 67  Resp:  [16-18] 18  BP: (138-160)/(76-93) 160/88  SpO2:  [92 %-98 %] 97 %    Physical Exam  Constitutional:       General: He is not in acute distress.     Appearance: He is not ill-appearing.   HENT:      Head: Normocephalic and atraumatic.      Right Ear: External ear normal.      Left Ear: External ear normal.      Mouth/Throat:      Pharynx: No oropharyngeal exudate or posterior oropharyngeal erythema.   Eyes:      Extraocular Movements: Extraocular movements intact.      Pupils: Pupils are equal, round, and reactive to light.   Cardiovascular:      Rate and Rhythm: Normal rate and regular rhythm.      Pulses: Normal pulses.      Heart sounds: Normal heart sounds.   Pulmonary:      Effort: Pulmonary effort is normal. No respiratory distress.      Breath sounds: Normal breath sounds. No wheezing or rales.   Abdominal:      General: Bowel sounds are normal. There is no distension.      Palpations: Abdomen is soft.      Tenderness: There is no abdominal tenderness.   Musculoskeletal:         General: Signs of injury present. No swelling or tenderness. Normal range of motion.      Cervical back: Normal range of motion and neck supple.      Right lower leg: No edema.      Left lower leg: No edema.      Comments: Left BKA, R TMA; right anterior shin wound with surrounding erythema   Skin:     General: Skin is warm and dry.   Neurological:      General: No focal deficit present.      Mental Status: He is oriented to person, place, and time.      Cranial Nerves: No cranial nerve deficit.      Motor: No weakness.   Psychiatric:         Mood and  Affect: Mood normal.         Behavior: Behavior normal.         Fluids    Intake/Output Summary (Last 24 hours) at 6/21/2025 1308  Last data filed at 6/21/2025 1210  Gross per 24 hour   Intake 1030 ml   Output 4000 ml   Net -2970 ml        Laboratory  Recent Labs     06/19/25  0652 06/20/25  0330 06/21/25  0459   WBC 5.5 4.7* 4.8   RBC 3.33* 3.25* 3.15*   HEMOGLOBIN 9.7* 9.4* 9.1*   HEMATOCRIT 30.0* 29.0* 29.0*   MCV 90.1 89.2 92.1   MCH 29.1 28.9 28.9   MCHC 32.3 32.4 31.4*   RDW 46.9 46.8 48.2   PLATELETCT 83* 95* 93*   MPV 10.6 10.5 10.3     Recent Labs     06/19/25  0652 06/20/25  0330 06/21/25  0459   SODIUM 134* 137 130*   POTASSIUM 3.5* 3.5* 4.1   CHLORIDE 97 98 95*   CO2 21 23 21   GLUCOSE 112* 108* 124*   BUN 70* 45* 56*   CREATININE 8.29* 7.08* 8.32*   CALCIUM 7.7* 8.3* 7.9*                     Imaging  DX-CHEST-PORTABLE (1 VIEW)   Final Result      Persistent right pleural effusion and right basilar atelectasis/infiltrate with some new opacity in the left midlung field laterally.      EC-ECHOCARDIOGRAM COMPLETE W/ CONT    (Results Pending)   MR-LUMBAR SPINE-WITH    (Results Pending)        Assessment/Plan  * Cellulitis- (present on admission)  Assessment & Plan  Right leg appears red and warm however difficult to distinguish this from his baseline venous stasis, left leg feels warm as well and patient is febrile so the warmth may be due to his general fever.   Continue current antibiotics, ancef  Follow cultures  ID following    Atrial fibrillation (HCC)  Assessment & Plan  New diagnosis  Echo pending  TSH normal  Anticoagulation when no procedures planned given elevated CHADSVASc score  Currently rate controlled without additional medications  Will monitor on telemetry  Optimize electrolytes    Hypertensive emergency  Assessment & Plan  Blood pressure up to 210/110  Asymptomatic  I have increased his amlodipine and continue losartan as well as ordered IV antihypertensives, continue to monitor and titrate  medications appropriately  improved    Pneumonia due to infectious organism  Assessment & Plan  Infiltrates on chest x-ray and patient has started to develop cough, has fever and elevated procalcitonin  ID consulted  Will continue these antibiotics  Blood culture growing MSSA, repeat blood cultures pending    MSSA bacteremia- (present on admission)  Assessment & Plan  Blood culture with MSSA  Hx of strep bacteremia in April, completed antibiotics then  Echo pending  MRI lumbar spine to rule out infection given reported back pain  ID following  Continue ancef    ESRD (end stage renal disease) on dialysis ([facility])- (present on admission)  Assessment & Plan  Cannot tolerate dialysis session due to feeling ill and febrile and was referred to ED  He follows with Dr. Ortiz and I have consulted him, nephrology will follow for HD needs and other recommendations, appreciated    Sepsis ([facility])- (present on admission)  Assessment & Plan  This is Sepsis Present on admission  SIRS criteria identified on my evaluation include: Fever, with temperature greater than 100.9 deg F and Tachycardia, with heart rate greater than 90 BPM  Clinical indicators of end organ dysfunction include Lactic Acid greater than 2  Source is pneumonia and/or cellulitis  Sepsis protocol initiated  Crystalloid Fluid Administration: Resuscitation volume of per EDP ordered. Reason that resuscitation volume of less than 30ml/kg was ordered concern for causing harm given ESRD  IV antibiotics as appropriate for source of sepsis  Reassessment: I have reassessed the patient's hemodynamic status    Type 2 diabetes mellitus with kidney complication, with long-term current use of insulin ([facility])- (present on admission)  Assessment & Plan  Stop lantus as not needing much insulin, continue ISS         VTE prophylaxis: VTE Selection    I have performed a physical exam and reviewed and updated ROS and Plan today (6/21/2025). In review of yesterday's note (6/20/2025), there are  no changes except as documented above.

## 2025-06-21 NOTE — PROGRESS NOTES
Infectious Disease Progress Note    Author: Mona Palma M.D. Date & Time of service: 2025  10:45 AM    Chief Complaint:  Follow-up for MSSA bacteremia     Interval History:   Tmax 102.1, WBC 4.7.  Patient feeling better today.  He is considering going back to outpatient hemodialysis     Review of Systems:  Review of Systems   Unable to perform ROS: Medical condition       Hemodynamics:  Temp (24hrs), Av.5 °C (97.7 °F), Min:36.3 °C (97.3 °F), Max:36.7 °C (98.1 °F)  Temperature: 36.5 °C (97.7 °F)  Pulse  Av  Min: 68  Max: 127   Blood Pressure: 138/77       Physical Exam:  Physical Exam  Cardiovascular:      Rate and Rhythm: Normal rate.   Pulmonary:      Effort: Pulmonary effort is normal.   Abdominal:      General: Abdomen is flat.   Skin:     General: Skin is warm.   Neurological:      Comments: Sleeping         Meds:    Current Facility-Administered Medications:     loperamide    cholestyramine    ceFAZolin    atorvastatin    heparin    aspirin    amLODIPine    losartan    metOLazone    torsemide    omeprazole    labetalol    [DISCONTINUED] insulin GLARGINE **AND** insulin lispro **AND** POC blood glucose manual result **AND** NOTIFY MD and PharmD **AND** Administer 20 grams of glucose (approximately 8 ounces of fruit juice) every 15 minutes PRN FSBG less than 70 mg/dL **AND** dextrose bolus    acetaminophen **FOLLOWED BY** [START ON 2025] acetaminophen    oxyCODONE immediate-release **OR** oxyCODONE immediate-release **OR** HYDROmorphone    epoetin    NS    lidocaine    lidocaine-prilocaine    Labs:  Recent Labs     25  1150 25  0652 25  0330 25  0459   WBC 6.7 5.5 4.7* 4.8   RBC 3.62* 3.33* 3.25* 3.15*   HEMOGLOBIN 10.6* 9.7* 9.4* 9.1*   HEMATOCRIT 31.6* 30.0* 29.0* 29.0*   MCV 87.3 90.1 89.2 92.1   MCH 29.3 29.1 28.9 28.9   RDW 44.8 46.9 46.8 48.2   PLATELETCT 120* 83* 95* 93*   MPV 10.0 10.6 10.5 10.3   NEUTSPOLYS 84.00*  --   --   --    LYMPHOCYTES 8.60*   --   --   --    MONOCYTES 5.70  --   --   --    EOSINOPHILS 0.70  --   --   --    BASOPHILS 0.30  --   --   --      Recent Labs     06/19/25  0652 06/20/25  0330 06/21/25  0459   SODIUM 134* 137 130*   POTASSIUM 3.5* 3.5* 4.1   CHLORIDE 97 98 95*   CO2 21 23 21   GLUCOSE 112* 108* 124*   BUN 70* 45* 56*     Recent Labs     06/18/25  1150 06/19/25  0652 06/20/25  0330 06/21/25  0459   ALBUMIN 4.2  --   --  3.3   TBILIRUBIN 1.2  --   --   --    ALKPHOSPHAT 77  --   --   --    TOTPROTEIN 7.9  --   --   --    ALTSGPT 9  --   --   --    ASTSGOT 17  --   --   --    CREATININE 7.29* 8.29* 7.08* 8.32*       Imaging:  DX-CHEST-PORTABLE (1 VIEW)  Result Date: 6/18/2025 6/18/2025 11:34 AM HISTORY/REASON FOR EXAM:  Sepsis. TECHNIQUE/EXAM DESCRIPTION AND NUMBER OF VIEWS: Single portable view of the chest. COMPARISON: 5/13/2025 FINDINGS: The cardiomediastinal silhouette is stable. There is persistent right pleural effusion and right basilar atelectasis. There are some increased markings in left midlung field laterally which may be due to atelectasis or infiltrate. There is no definite left pleural effusion or pneumothorax.     Persistent right pleural effusion and right basilar atelectasis/infiltrate with some new opacity in the left midlung field laterally.      Micro:  Results       Procedure Component Value Units Date/Time    Blood Culture - Draw one from central line and one from peripheral site [[id number]]  (Abnormal) Collected: 06/18/25 1207    Order Status: Completed Specimen: Blood from Peripheral Updated: 06/21/25 1004     Significant Indicator POS     Source BLD     Site PERIPHERAL     Culture Result Growth detected by automated blood culture system. 06/19/2025  01:44  Staphylococcus aureus (methicillin sensitive)  detected by PCR.        Staphylococcus aureus  See previous culture for sensitivity report.      Blood Culture - Draw one from central line and one from peripheral site [[id number]]  (Abnormal)   (Susceptibility) Collected: 06/18/25 1150    Order Status: Completed Specimen: Blood from Line Updated: 06/21/25 1004     Significant Indicator POS     Source BLD     Site Peripheral     Culture Result Growth detected by automated blood culture system. 06/19/2025  01:46        Staphylococcus aureus    Susceptibility       Staphylococcus aureus (1)       Antibiotic Interpretation Microscan   Method Status    Ampicillin/sulbactam Sensitive <=8/4 mcg/mL IRINEO Final    Clindamycin Sensitive 0.5 mcg/mL IRINEO Final    Cefazolin Sensitive <=8 mcg/mL IRINEO Final    Cefepime Sensitive <=4 mcg/mL IRINEO Final    Daptomycin Sensitive 1 mcg/mL IRINEO Final    Erythromycin Resistant >4 mcg/mL IRINEO Final    Oxacillin Sensitive <=0.25 mcg/mL IRINEO Final    Trimeth/Sulfa Sensitive <=0.5/9.5 mcg/mL IRINEO Final    Tetracycline Sensitive <=4 mcg/mL IRINEO Final    Vancomycin Sensitive 1 mcg/mL IRINEO Final                       CULTURE WOUND W/ GRAM STAIN [[id number]]  (Abnormal)  (Susceptibility) Collected: 06/18/25 1150    Order Status: Completed Specimen: Wound from Right Leg Updated: 06/20/25 1212     Significant Indicator POS     Source WND     Site RIGHT LEG     Culture Result Light growth usual skin mable.     Gram Stain Result No organisms seen.     Culture Result Staphylococcus aureus  Light growth  Methicillin sensitive by screening method      Susceptibility       Staphylococcus aureus (1)       Antibiotic Interpretation Microscan   Method Status    Ampicillin/sulbactam Sensitive <=8/4 mcg/mL IRINEO Final    Clindamycin Sensitive <=0.25 mcg/mL IRINEO Final    Cefazolin Sensitive <=8 mcg/mL IRINEO Final    Cefepime Sensitive <=4 mcg/mL IRINEO Final    Daptomycin Sensitive <=0.5 mcg/mL IRINEO Final    Erythromycin Resistant >4 mcg/mL IRINEO Final    Oxacillin Sensitive 0.5 mcg/mL IRINEO Final    Trimeth/Sulfa Sensitive <=0.5/9.5 mcg/mL IRINEO Final    Tetracycline Sensitive <=4 mcg/mL IRINEO Final    Vancomycin Sensitive 1 mcg/mL IRINEO Final                       BLOOD CULTURE  [[id number]] Collected: 06/20/25 0330    Order Status: Completed Specimen: Blood from Peripheral Updated: 06/20/25 0608     Significant Indicator NEG     Source BLD     Site PERIPHERAL     Culture Result No Growth  Note: Blood cultures are incubated for 5 days and  are monitored continuously.Positive blood cultures  are called to the RN and reported as soon as  they are identified.      BLOOD CULTURE [[id number]] Collected: 06/20/25 0336    Order Status: Completed Specimen: Blood from Peripheral Updated: 06/20/25 0608     Significant Indicator NEG     Source BLD     Site PERIPHERAL     Culture Result No Growth  Note: Blood cultures are incubated for 5 days and  are monitored continuously.Positive blood cultures  are called to the RN and reported as soon as  they are identified.      MRSA By PCR (Amp) [[id number]] Collected: 06/18/25 1456    Order Status: Completed Specimen: Respirate from Nares Updated: 06/18/25 1848     MRSA by PCR Negative    GRAM STAIN [[id number]] Collected: 06/18/25 1150    Order Status: Completed Specimen: Wound Updated: 06/18/25 1420     Significant Indicator .     Source WND     Site RIGHT LEG     Gram Stain Result No organisms seen.    CoV-2, FLU A/B, and RSV by PCR (2-4 Hours CEPHEID) : Collect NP swab in VTM [[id number]] Collected: 06/18/25 1150    Order Status: Completed Specimen: Respirate Updated: 06/18/25 1300     Influenza virus A RNA Negative     Influenza virus B, PCR Negative     RSV, PCR Negative     SARS-CoV-2 by PCR NotDetected     Comment: [facility] providers: PLEASE REFER TO DE-ESCALATION AND RETESTING PROTOCOL  on [email or web address]    **The Cep[employer] Messaging GeneXpert Xpress SARS-CoV-2 RT-PCR Test has been made  available for use under the Emergency Use Authorization (EUA) only.          SARS-CoV-2 Source NP Swab    Urinalysis [[id number]]     Order Status: Sent Specimen: Urine     Urine Culture (New) [[id number]]     Order Status: Sent Specimen: Urine             Assessment:  Active Hospital  Problems    Diagnosis     *Cellulitis [L03.90]     Pneumonia due to infectious organism [J18.9]     Hypertensive emergency [I16.1]     Atrial fibrillation (HCC) [I48.91]     MSSA bacteremia [R78.81, B95.61]     Sepsis (HCC) [A41.9]     ESRD (end stage renal disease) on dialysis (HCC) [N18.6, Z99.2]     Type 2 diabetes mellitus with kidney complication, with long-term current use of insulin (HCC) [E11.29, Z79.4]      Interval 24 hours:      AF, O2 RA  Labs reviewed  New Imaging personally reviewed both images and report.  Studies reviewed  Micro reviewed  Notes from primary team and specialists reviewed    Pt getting dialysis today, sleeping appears comfortable.    Antibiotics as below.       Assessment:  54 y.o. male with a history of ESRD on hemodialysis, type 2 diabetes mellitus, history of group A streptococcus bacteremia in March secondary to cellulitis admitted on 6/18/2025 secondary to fever and malaise.  Patient now found to have MSSA bacteremia.  Source likely cellulitis and wound infection.       Pertinent diagnoses:  MSSA bacteremia  Acute hypoxic respiratory failure  Right lower extremity cellulitis, wound cultures on 618 positive for MSSA  Pneumonia  Thrombocytopenia  ESRD on hemodialysis  Type 2 diabetes mellitus  Right TMA  Left BKA  History of group A streptococcus bacteremia in March 2025  History of left shoulder hardware in place     Plan:  -Continue IV cefazolin 1 g daily, renal dosing, will consider rifampin if blood cultures remain negative due to hardware  -Blood cultures on 6/18+ MSSA  -Follow repeat blood cultures on 6/20-negative to date  -Right lower extremity wound culture+ MSSA  -Follow TTE, pending   -Follow MRI of the lumbar spine with contrast, pending      This infection pose a threat to life     Disposition: TBD.  Will need to determine if patient will do outpatient hemodialysis versus hemodialysis 5 times a week     Need for tunneled catheter: TBD     Follow-up in the ID clinic with  NP     Discussed with internal medicine/Dr. Patiño.  ID will continue to follow

## 2025-06-21 NOTE — PROGRESS NOTES
[facility] Services Progress Notes     UF net removed: 3500mL     HD treatment completed as ordered per Dr Plascencia for 3.5hrs. Started at 0814, ended at 1144.     Patient tolerated dialysis treatment w/o complications. See HD flowsheets for reference.     Post HD vital signs stable. +B/T. Access site held for 10mins. Hemostasis achieved. Dressing changed per protocol. Instructed patient to take off dressing after 3-4hrs. Report given to Hossein Cooper RN.

## 2025-06-21 NOTE — CARE PLAN
The patient is Stable - Low risk of patient condition declining or worsening    Shift Goals  Clinical Goals: IV abx, pain management, VSS, MRI, echo  Patient Goals: pain control, rest  Family Goals: mima    Progress made toward(s) clinical / shift goals:        Problem: Pain - Standard  Goal: Alleviation of pain or a reduction in pain to the patient’s comfort goal  Outcome: Not Progressing    Problem: Pain - Standard  Goal: Alleviation of pain or a reduction in pain to the patient’s comfort goal  Outcome: Not Progressing

## 2025-06-21 NOTE — PROGRESS NOTES
Monitor Summary  Rhythm: SR  Rate: 68-76  Ectopy: R PVC  Measurements: .15/.07/.45  ---12 hr Chart Review---

## 2025-06-21 NOTE — CARE PLAN
The patient is Stable - Low risk of patient condition declining or worsening    Shift Goals  Clinical Goals: Pain management, IV abx, monitor vitals and labs  Patient Goals: Pain control, get MRI and Echo done  Family Goals: JENNIFER    Progress made toward(s) clinical / shift goals:    Problem: Knowledge Deficit - Standard  Goal: Patient and family/care givers will demonstrate understanding of plan of care, disease process/condition, diagnostic tests and medications  Outcome: Progressing     Problem: Hemodynamics  Goal: Patient's hemodynamics, fluid balance and neurologic status will be stable or improve  Outcome: Progressing     Problem: Fluid Volume  Goal: Fluid volume balance will be maintained  Outcome: Progressing     Problem: Urinary - Renal Perfusion  Goal: Ability to achieve and maintain adequate renal perfusion and functioning will improve  Outcome: Progressing     Problem: Respiratory  Goal: Patient will achieve/maintain optimum respiratory ventilation and gas exchange  Outcome: Progressing     Problem: Skin Integrity  Goal: Skin integrity is maintained or improved  Outcome: Progressing     Problem: Fall Risk  Goal: Patient will remain free from falls  Outcome: Progressing     Problem: Pain - Standard  Goal: Alleviation of pain or a reduction in pain to the patient’s comfort goal  Outcome: Progressing       Patient is not progressing towards the following goals:

## 2025-06-22 ENCOUNTER — APPOINTMENT (OUTPATIENT)
Dept: RADIOLOGY | Facility: MEDICAL CENTER | Age: 55
DRG: 871 | End: 2025-06-22
Attending: STUDENT IN AN ORGANIZED HEALTH CARE EDUCATION/TRAINING PROGRAM
Payer: MEDICARE

## 2025-06-22 LAB
ALBUMIN SERPL BCP-MCNC: 3.4 G/DL (ref 3.2–4.9)
ANION GAP SERPL CALC-SCNC: 14 MMOL/L (ref 7–16)
BUN SERPL-MCNC: 41 MG/DL (ref 8–22)
CALCIUM ALBUM COR SERPL-MCNC: 8.5 MG/DL (ref 8.5–10.5)
CALCIUM SERPL-MCNC: 8 MG/DL (ref 8.5–10.5)
CHLORIDE SERPL-SCNC: 96 MMOL/L (ref 96–112)
CO2 SERPL-SCNC: 24 MMOL/L (ref 20–33)
CREAT SERPL-MCNC: 6.89 MG/DL (ref 0.5–1.4)
GFR SERPLBLD CREATININE-BSD FMLA CKD-EPI: 9 ML/MIN/1.73 M 2
GLUCOSE BLD STRIP.AUTO-MCNC: 137 MG/DL (ref 65–99)
GLUCOSE BLD STRIP.AUTO-MCNC: 158 MG/DL (ref 65–99)
GLUCOSE BLD STRIP.AUTO-MCNC: 159 MG/DL (ref 65–99)
GLUCOSE BLD STRIP.AUTO-MCNC: 172 MG/DL (ref 65–99)
GLUCOSE SERPL-MCNC: 153 MG/DL (ref 65–99)
PHOSPHATE SERPL-MCNC: 4.8 MG/DL (ref 2.5–4.5)
POTASSIUM SERPL-SCNC: 3.8 MMOL/L (ref 3.6–5.5)
SODIUM SERPL-SCNC: 134 MMOL/L (ref 135–145)

## 2025-06-22 PROCEDURE — 82962 GLUCOSE BLOOD TEST: CPT | Performed by: STUDENT IN AN ORGANIZED HEALTH CARE EDUCATION/TRAINING PROGRAM

## 2025-06-22 PROCEDURE — 90935 HEMODIALYSIS ONE EVALUATION: CPT

## 2025-06-22 PROCEDURE — 700111 HCHG RX REV CODE 636 W/ 250 OVERRIDE (IP): Performed by: STUDENT IN AN ORGANIZED HEALTH CARE EDUCATION/TRAINING PROGRAM

## 2025-06-22 PROCEDURE — 99232 SBSQ HOSP IP/OBS MODERATE 35: CPT | Performed by: STUDENT IN AN ORGANIZED HEALTH CARE EDUCATION/TRAINING PROGRAM

## 2025-06-22 PROCEDURE — 36415 COLL VENOUS BLD VENIPUNCTURE: CPT

## 2025-06-22 PROCEDURE — 72158 MRI LUMBAR SPINE W/O & W/DYE: CPT

## 2025-06-22 PROCEDURE — 700102 HCHG RX REV CODE 250 W/ 637 OVERRIDE(OP): Performed by: STUDENT IN AN ORGANIZED HEALTH CARE EDUCATION/TRAINING PROGRAM

## 2025-06-22 PROCEDURE — A9579 GAD-BASE MR CONTRAST NOS,1ML: HCPCS | Mod: JZ | Performed by: STUDENT IN AN ORGANIZED HEALTH CARE EDUCATION/TRAINING PROGRAM

## 2025-06-22 PROCEDURE — A9270 NON-COVERED ITEM OR SERVICE: HCPCS | Performed by: STUDENT IN AN ORGANIZED HEALTH CARE EDUCATION/TRAINING PROGRAM

## 2025-06-22 PROCEDURE — 700117 HCHG RX CONTRAST REV CODE 255: Mod: JZ | Performed by: STUDENT IN AN ORGANIZED HEALTH CARE EDUCATION/TRAINING PROGRAM

## 2025-06-22 PROCEDURE — 700111 HCHG RX REV CODE 636 W/ 250 OVERRIDE (IP): Mod: JZ | Performed by: INTERNAL MEDICINE

## 2025-06-22 PROCEDURE — 90935 HEMODIALYSIS ONE EVALUATION: CPT | Performed by: INTERNAL MEDICINE

## 2025-06-22 PROCEDURE — 80069 RENAL FUNCTION PANEL: CPT

## 2025-06-22 PROCEDURE — 770020 HCHG ROOM/CARE - TELE (206)

## 2025-06-22 RX ORDER — GADOTERIDOL 279.3 MG/ML
20 INJECTION INTRAVENOUS ONCE
Status: COMPLETED | OUTPATIENT
Start: 2025-06-22 | End: 2025-06-22

## 2025-06-22 RX ADMIN — INSULIN LISPRO 1 UNITS: 100 INJECTION, SOLUTION INTRAVENOUS; SUBCUTANEOUS at 11:33

## 2025-06-22 RX ADMIN — CHOLESTYRAMINE 4 G: 4 POWDER, FOR SUSPENSION ORAL at 17:02

## 2025-06-22 RX ADMIN — OXYCODONE 5 MG: 5 TABLET ORAL at 00:42

## 2025-06-22 RX ADMIN — ACETAMINOPHEN 650 MG: 325 TABLET ORAL at 11:30

## 2025-06-22 RX ADMIN — AMLODIPINE BESYLATE 10 MG: 10 TABLET ORAL at 06:13

## 2025-06-22 RX ADMIN — OMEPRAZOLE 20 MG: 20 CAPSULE, DELAYED RELEASE ORAL at 06:11

## 2025-06-22 RX ADMIN — HEPARIN SODIUM 5000 UNITS: 5000 INJECTION, SOLUTION INTRAVENOUS; SUBCUTANEOUS at 18:31

## 2025-06-22 RX ADMIN — OXYCODONE 5 MG: 5 TABLET ORAL at 03:55

## 2025-06-22 RX ADMIN — METOLAZONE 5 MG: 5 TABLET ORAL at 06:11

## 2025-06-22 RX ADMIN — ACETAMINOPHEN 650 MG: 325 TABLET ORAL at 06:13

## 2025-06-22 RX ADMIN — HEPARIN SODIUM 5000 UNITS: 5000 INJECTION, SOLUTION INTRAVENOUS; SUBCUTANEOUS at 06:14

## 2025-06-22 RX ADMIN — OXYCODONE 5 MG: 5 TABLET ORAL at 20:41

## 2025-06-22 RX ADMIN — GADOTERIDOL 20 ML: 279.3 INJECTION, SOLUTION INTRAVENOUS at 18:03

## 2025-06-22 RX ADMIN — LOSARTAN POTASSIUM 100 MG: 50 TABLET, FILM COATED ORAL at 06:12

## 2025-06-22 RX ADMIN — INSULIN LISPRO 1 UNITS: 100 INJECTION, SOLUTION INTRAVENOUS; SUBCUTANEOUS at 16:48

## 2025-06-22 RX ADMIN — CHOLESTYRAMINE 4 G: 4 POWDER, FOR SUSPENSION ORAL at 10:00

## 2025-06-22 RX ADMIN — ACETAMINOPHEN 650 MG: 325 TABLET ORAL at 18:31

## 2025-06-22 RX ADMIN — INSULIN LISPRO 1 UNITS: 100 INJECTION, SOLUTION INTRAVENOUS; SUBCUTANEOUS at 20:48

## 2025-06-22 RX ADMIN — ACETAMINOPHEN 650 MG: 325 TABLET ORAL at 00:42

## 2025-06-22 RX ADMIN — ATORVASTATIN CALCIUM 40 MG: 40 TABLET, FILM COATED ORAL at 20:41

## 2025-06-22 RX ADMIN — TORSEMIDE 100 MG: 100 TABLET ORAL at 06:12

## 2025-06-22 RX ADMIN — ASPIRIN 81 MG: 81 TABLET, COATED ORAL at 06:13

## 2025-06-22 RX ADMIN — CEFAZOLIN SODIUM 1 G: 1 INJECTION, SOLUTION INTRAVENOUS at 16:05

## 2025-06-22 RX ADMIN — OXYCODONE HYDROCHLORIDE 10 MG: 10 TABLET ORAL at 13:04

## 2025-06-22 ASSESSMENT — ENCOUNTER SYMPTOMS
FALLS: 0
FEVER: 1
CHILLS: 0
HEADACHES: 0
INSOMNIA: 0
VOMITING: 0
ABDOMINAL PAIN: 0
NAUSEA: 0
SHORTNESS OF BREATH: 0
LOSS OF CONSCIOUSNESS: 0
BLURRED VISION: 0
SENSORY CHANGE: 0
DIZZINESS: 0
EYE PAIN: 0
COUGH: 0
FOCAL WEAKNESS: 0
PALPITATIONS: 0
TINGLING: 1
BACK PAIN: 0

## 2025-06-22 ASSESSMENT — PAIN DESCRIPTION - PAIN TYPE
TYPE: ACUTE PAIN

## 2025-06-22 ASSESSMENT — LIFESTYLE VARIABLES: SUBSTANCE_ABUSE: 0

## 2025-06-22 NOTE — PROCEDURES
Nephrology/Hemodialysis note    Patient with ESRD/HHD admitted with sepsis  Seen and examined during dialysis  Tolerates well  VS stable  UF goal 3 L  Lab results reviewed  Please see dialysis flow sheet for details

## 2025-06-22 NOTE — PROGRESS NOTES
[facility] Medicine Daily Progress Note    Date of Service  6/22/2025    Chief Complaint  Beni Moore is a 54 y.o. male admitted 6/18/2025 with fever.    Hospital Course    Beni Moore is a 54 y.o. male who presented 6/18/2025 with fever.  Very pleasant man with history of ESRD on HD, DM 2, prior alcohol and tobacco use in remission, hypertension.  He was at dialysis today and began feeling ill fairly quickly there during the session.  He was found to be febrile and was referred to the ED.  Initially was felt that he had right lower extremity cellulitis and was started on cefazolin however subsequently chest x-ray also revealed infiltrates and patient did report feeling like he was developing a cough so antibiotics were expanded to treat pneumonia with Unasyn and azithromycin.  He was febrile in the ED and had elevated lactic acid of 2.2, tachycardic.  EKG showed possible A-fib, awaiting repeat EKG at this time.     Interval Problem Update  No acute events overnight.  Continue dialysis per nephrology team.  Continue ancef for MSSA bacteremia.  Echo and MRI L spine pending.  Possible discharge home soon if echo and MRI are reassuring/no signs of infection. Will get IV antibiotics administered at dialysis center, discussed with patient.        I have discussed this patient's plan of care and discharge plan at IDT rounds today with Case Management, Nursing, Nursing leadership, and other members of the IDT team.    Consultants/Specialty  infectious disease and nephrology    Code Status  Full Code    Disposition  Medically Cleared  I have placed the appropriate orders for post-discharge needs.    Review of Systems  Review of Systems   Constitutional:  Positive for fever. Negative for chills.   Eyes:  Negative for blurred vision and pain.   Respiratory:  Negative for cough and shortness of breath.    Cardiovascular:  Negative for chest pain, palpitations and leg swelling.   Gastrointestinal:  Negative for abdominal  pain, nausea and vomiting.   Genitourinary:  Negative for dysuria and urgency.   Musculoskeletal:  Negative for back pain and falls.   Skin:  Negative for itching and rash.   Neurological:  Positive for tingling. Negative for dizziness, sensory change, focal weakness, loss of consciousness and headaches.   Psychiatric/Behavioral:  Negative for substance abuse. The patient does not have insomnia.         Physical Exam  Temp:  [36.2 °C (97.2 °F)-36.9 °C (98.4 °F)] 36.6 °C (97.9 °F)  Pulse:  [67-76] 76  Resp:  [16-18] 18  BP: (153-162)/(83-89) 162/85  SpO2:  [94 %-97 %] 95 %    Physical Exam  Constitutional:       General: He is not in acute distress.     Appearance: He is not ill-appearing.   HENT:      Head: Normocephalic and atraumatic.      Right Ear: External ear normal.      Left Ear: External ear normal.      Mouth/Throat:      Pharynx: No oropharyngeal exudate or posterior oropharyngeal erythema.   Eyes:      Extraocular Movements: Extraocular movements intact.      Pupils: Pupils are equal, round, and reactive to light.   Cardiovascular:      Rate and Rhythm: Normal rate and regular rhythm.      Pulses: Normal pulses.      Heart sounds: Normal heart sounds.   Pulmonary:      Effort: Pulmonary effort is normal. No respiratory distress.      Breath sounds: Normal breath sounds. No wheezing or rales.   Abdominal:      General: Bowel sounds are normal. There is no distension.      Palpations: Abdomen is soft.      Tenderness: There is no abdominal tenderness.   Musculoskeletal:         General: Signs of injury present. No swelling or tenderness. Normal range of motion.      Cervical back: Normal range of motion and neck supple.      Right lower leg: No edema.      Left lower leg: No edema.      Comments: Left BKA, R TMA; right anterior shin wound with surrounding erythema   Skin:     General: Skin is warm and dry.   Neurological:      General: No focal deficit present.      Mental Status: He is oriented to person,  place, and time.      Cranial Nerves: No cranial nerve deficit.      Motor: No weakness.   Psychiatric:         Mood and Affect: Mood normal.         Behavior: Behavior normal.         Fluids    Intake/Output Summary (Last 24 hours) at 6/22/2025 1042  Last data filed at 6/22/2025 0740  Gross per 24 hour   Intake 860 ml   Output 4000 ml   Net -3140 ml        Laboratory  Recent Labs     06/20/25  0330 06/21/25  0459   WBC 4.7* 4.8   RBC 3.25* 3.15*   HEMOGLOBIN 9.4* 9.1*   HEMATOCRIT 29.0* 29.0*   MCV 89.2 92.1   MCH 28.9 28.9   MCHC 32.4 31.4*   RDW 46.8 48.2   PLATELETCT 95* 93*   MPV 10.5 10.3     Recent Labs     06/20/25  0330 06/21/25 0459 06/22/25  0408   SODIUM 137 130* 134*   POTASSIUM 3.5* 4.1 3.8   CHLORIDE 98 95* 96   CO2 23 21 24   GLUCOSE 108* 124* 153*   BUN 45* 56* 41*   CREATININE 7.08* 8.32* 6.89*   CALCIUM 8.3* 7.9* 8.0*                     Imaging  DX-CHEST-PORTABLE (1 VIEW)   Final Result      Persistent right pleural effusion and right basilar atelectasis/infiltrate with some new opacity in the left midlung field laterally.      EC-ECHOCARDIOGRAM COMPLETE W/ CONT    (Results Pending)   MR-LUMBAR SPINE-WITH    (Results Pending)        Assessment/Plan  * Cellulitis- (present on admission)  Assessment & Plan  Right leg appears red and warm however difficult to distinguish this from his baseline venous stasis, left leg feels warm as well and patient is febrile so the warmth may be due to his general fever.   Continue current antibiotics, ancef  Follow cultures  ID following    Atrial fibrillation (HCC)  Assessment & Plan  New diagnosis  Echo pending  TSH normal  Anticoagulation when no procedures planned given elevated CHADSVASc score  Currently rate controlled without additional medications  Will monitor on telemetry  Optimize electrolytes    Hypertensive emergency  Assessment & Plan  Blood pressure up to 210/110  Asymptomatic  I have increased his amlodipine and continue losartan as well as ordered  IV antihypertensives, continue to monitor and titrate medications appropriately  improved    Pneumonia due to infectious organism  Assessment & Plan  Infiltrates on chest x-ray and patient has started to develop cough, has fever and elevated procalcitonin  ID consulted  Will continue these antibiotics  Blood culture growing MSSA, repeat blood cultures pending    MSSA bacteremia- (present on admission)  Assessment & Plan  Blood culture with MSSA  Hx of strep bacteremia in April, completed antibiotics then  Echo pending  MRI lumbar spine to rule out infection given reported back pain  ID following  Continue ancef    ESRD (end stage renal disease) on dialysis ([facility])- (present on admission)  Assessment & Plan  Cannot tolerate dialysis session due to feeling ill and febrile and was referred to ED  He follows with Dr. Ortiz and I have consulted him, nephrology will follow for HD needs and other recommendations, appreciated    Sepsis ([facility])- (present on admission)  Assessment & Plan  This is Sepsis Present on admission  SIRS criteria identified on my evaluation include: Fever, with temperature greater than 100.9 deg F and Tachycardia, with heart rate greater than 90 BPM  Clinical indicators of end organ dysfunction include Lactic Acid greater than 2  Source is pneumonia and/or cellulitis  Sepsis protocol initiated  Crystalloid Fluid Administration: Resuscitation volume of per EDP ordered. Reason that resuscitation volume of less than 30ml/kg was ordered concern for causing harm given ESRD  IV antibiotics as appropriate for source of sepsis  Reassessment: I have reassessed the patient's hemodynamic status    Type 2 diabetes mellitus with kidney complication, with long-term current use of insulin ([facility] [place])- (present on admission)  Assessment & Plan  Stop lantus as not needing much insulin, continue ISS         VTE prophylaxis: VTE Selection    I have performed a physical exam and reviewed and updated ROS and Plan today  (6/22/2025). In review of yesterday's note (6/21/2025), there are no changes except as documented above.

## 2025-06-22 NOTE — PROGRESS NOTES
Received report from RN. Assumed pt care. Patient on tele box, on 1L NC, and spO2 at 97%. Patient A&O x4. Fall precautions in place, call light and belongings within reach, bed in lowest position. No signs of distress.

## 2025-06-22 NOTE — CARE PLAN
The patient is Stable - Low risk of patient condition declining or worsening    Shift Goals  Clinical Goals: Pain management, Dialysis today, monitor labs and vitals  Patient Goals: Pain mangement, dialysis today, get MRI and Echo done  Family Goals: JENNIFER    Progress made toward(s) clinical / shift goals:    Problem: Knowledge Deficit - Standard  Goal: Patient and family/care givers will demonstrate understanding of plan of care, disease process/condition, diagnostic tests and medications  Outcome: Progressing     Problem: Hemodynamics  Goal: Patient's hemodynamics, fluid balance and neurologic status will be stable or improve  Outcome: Progressing     Problem: Fluid Volume  Goal: Fluid volume balance will be maintained  Outcome: Progressing     Problem: Urinary - Renal Perfusion  Goal: Ability to achieve and maintain adequate renal perfusion and functioning will improve  Outcome: Progressing     Problem: Respiratory  Goal: Patient will achieve/maintain optimum respiratory ventilation and gas exchange  Outcome: Progressing     Problem: Fall Risk  Goal: Patient will remain free from falls  Outcome: Progressing     Problem: Pain - Standard  Goal: Alleviation of pain or a reduction in pain to the patient’s comfort goal  Outcome: Progressing       Patient is not progressing towards the following goals:

## 2025-06-22 NOTE — CARE PLAN
The patient is Stable - Low risk of patient condition declining or worsening    Shift Goals  Clinical Goals: pain management, IV abx, monitor labs, VSS  Patient Goals: pain control, rest  Family Goals: mima    Progress made toward(s) clinical / shift goals:      Pain was managed with PO oxy and PO tylenol. Vitals remained stable.    Problem: Knowledge Deficit - Standard  Goal: Patient and family/care givers will demonstrate understanding of plan of care, disease process/condition, diagnostic tests and medications  Outcome: Progressing     Problem: Hemodynamics  Goal: Patient's hemodynamics, fluid balance and neurologic status will be stable or improve  Outcome: Progressing     Problem: Respiratory  Goal: Patient will achieve/maintain optimum respiratory ventilation and gas exchange  Outcome: Progressing     Problem: Skin Integrity  Goal: Skin integrity is maintained or improved  Outcome: Progressing     Problem: Fall Risk  Goal: Patient will remain free from falls  Outcome: Progressing     Problem: Pain - Standard  Goal: Alleviation of pain or a reduction in pain to the patient’s comfort goal  Outcome: Progressing

## 2025-06-22 NOTE — PROGRESS NOTES
Monitor Summary  Rhythm: SR  Rate: 68-74  Ectopy: (O) PVC's  Measurements: .15/.07/.46

## 2025-06-22 NOTE — PROGRESS NOTES
Dialysis Progress Notes       Hemodialysis ordered by Dr. Melyssa Plascencia    Treatment well tolerated for 3.5 hours, started at 1104, ended at 1434. No other complications noted, VSS during treatment, Please refer to HD flowsheet for details. Patient seen by Dr Plascencia during HD.     NET UF: 3L    AVF on left arm patent. Dressing with gauze and tape, no bleeding on site. Report to priamry RN to monitor access site for bleeding and remove dressing after 4 hours.     Report to Yesica HENAO

## 2025-06-23 ENCOUNTER — APPOINTMENT (OUTPATIENT)
Dept: CARDIOLOGY | Facility: MEDICAL CENTER | Age: 55
DRG: 871 | End: 2025-06-23
Attending: INTERNAL MEDICINE
Payer: MEDICARE

## 2025-06-23 ENCOUNTER — APPOINTMENT (OUTPATIENT)
Dept: CARDIOLOGY | Facility: MEDICAL CENTER | Age: 55
DRG: 871 | End: 2025-06-23
Attending: STUDENT IN AN ORGANIZED HEALTH CARE EDUCATION/TRAINING PROGRAM
Payer: MEDICARE

## 2025-06-23 LAB
ALBUMIN SERPL BCP-MCNC: 3.3 G/DL (ref 3.2–4.9)
ANION GAP SERPL CALC-SCNC: 12 MMOL/L (ref 7–16)
BUN SERPL-MCNC: 30 MG/DL (ref 8–22)
CALCIUM ALBUM COR SERPL-MCNC: 8.5 MG/DL (ref 8.5–10.5)
CALCIUM SERPL-MCNC: 7.9 MG/DL (ref 8.5–10.5)
CHLORIDE SERPL-SCNC: 96 MMOL/L (ref 96–112)
CO2 SERPL-SCNC: 26 MMOL/L (ref 20–33)
CREAT SERPL-MCNC: 5.82 MG/DL (ref 0.5–1.4)
ERYTHROCYTE [DISTWIDTH] IN BLOOD BY AUTOMATED COUNT: 44.9 FL (ref 35.9–50)
GFR SERPLBLD CREATININE-BSD FMLA CKD-EPI: 11 ML/MIN/1.73 M 2
GLUCOSE BLD STRIP.AUTO-MCNC: 124 MG/DL (ref 65–99)
GLUCOSE BLD STRIP.AUTO-MCNC: 127 MG/DL (ref 65–99)
GLUCOSE BLD STRIP.AUTO-MCNC: 138 MG/DL (ref 65–99)
GLUCOSE BLD STRIP.AUTO-MCNC: 178 MG/DL (ref 65–99)
GLUCOSE SERPL-MCNC: 205 MG/DL (ref 65–99)
HCT VFR BLD AUTO: 30.2 % (ref 42–52)
HGB BLD-MCNC: 9.6 G/DL (ref 14–18)
LV EJECT FRACT MOD 2C 99903: 51.63
LV EJECT FRACT MOD 4C 99902: 52.34
LV EJECT FRACT MOD BP 99901: 51
MCH RBC QN AUTO: 28.7 PG (ref 27–33)
MCHC RBC AUTO-ENTMCNC: 31.8 G/DL (ref 32.3–36.5)
MCV RBC AUTO: 90.1 FL (ref 81.4–97.8)
PHOSPHATE SERPL-MCNC: 3.8 MG/DL (ref 2.5–4.5)
PLATELET # BLD AUTO: 105 K/UL (ref 164–446)
PMV BLD AUTO: 10.9 FL (ref 9–12.9)
POTASSIUM SERPL-SCNC: 3.9 MMOL/L (ref 3.6–5.5)
RBC # BLD AUTO: 3.35 M/UL (ref 4.7–6.1)
SODIUM SERPL-SCNC: 134 MMOL/L (ref 135–145)
WBC # BLD AUTO: 4 K/UL (ref 4.8–10.8)

## 2025-06-23 PROCEDURE — 700111 HCHG RX REV CODE 636 W/ 250 OVERRIDE (IP): Mod: JZ,TB | Performed by: INTERNAL MEDICINE

## 2025-06-23 PROCEDURE — 770020 HCHG ROOM/CARE - TELE (206)

## 2025-06-23 PROCEDURE — 700102 HCHG RX REV CODE 250 W/ 637 OVERRIDE(OP): Performed by: STUDENT IN AN ORGANIZED HEALTH CARE EDUCATION/TRAINING PROGRAM

## 2025-06-23 PROCEDURE — 90935 HEMODIALYSIS ONE EVALUATION: CPT | Performed by: INTERNAL MEDICINE

## 2025-06-23 PROCEDURE — 36415 COLL VENOUS BLD VENIPUNCTURE: CPT

## 2025-06-23 PROCEDURE — 80069 RENAL FUNCTION PANEL: CPT

## 2025-06-23 PROCEDURE — 99233 SBSQ HOSP IP/OBS HIGH 50: CPT | Performed by: STUDENT IN AN ORGANIZED HEALTH CARE EDUCATION/TRAINING PROGRAM

## 2025-06-23 PROCEDURE — 99233 SBSQ HOSP IP/OBS HIGH 50: CPT | Performed by: INTERNAL MEDICINE

## 2025-06-23 PROCEDURE — 93306 TTE W/DOPPLER COMPLETE: CPT

## 2025-06-23 PROCEDURE — 93306 TTE W/DOPPLER COMPLETE: CPT | Mod: 26 | Performed by: STUDENT IN AN ORGANIZED HEALTH CARE EDUCATION/TRAINING PROGRAM

## 2025-06-23 PROCEDURE — A9270 NON-COVERED ITEM OR SERVICE: HCPCS | Performed by: STUDENT IN AN ORGANIZED HEALTH CARE EDUCATION/TRAINING PROGRAM

## 2025-06-23 PROCEDURE — 700111 HCHG RX REV CODE 636 W/ 250 OVERRIDE (IP): Performed by: STUDENT IN AN ORGANIZED HEALTH CARE EDUCATION/TRAINING PROGRAM

## 2025-06-23 PROCEDURE — 85027 COMPLETE CBC AUTOMATED: CPT

## 2025-06-23 PROCEDURE — 700111 HCHG RX REV CODE 636 W/ 250 OVERRIDE (IP): Mod: JZ | Performed by: INTERNAL MEDICINE

## 2025-06-23 PROCEDURE — 99222 1ST HOSP IP/OBS MODERATE 55: CPT | Performed by: INTERNAL MEDICINE

## 2025-06-23 PROCEDURE — 90935 HEMODIALYSIS ONE EVALUATION: CPT

## 2025-06-23 PROCEDURE — 82962 GLUCOSE BLOOD TEST: CPT | Performed by: STUDENT IN AN ORGANIZED HEALTH CARE EDUCATION/TRAINING PROGRAM

## 2025-06-23 RX ADMIN — OMEPRAZOLE 20 MG: 20 CAPSULE, DELAYED RELEASE ORAL at 05:45

## 2025-06-23 RX ADMIN — OXYCODONE HYDROCHLORIDE 10 MG: 10 TABLET ORAL at 19:49

## 2025-06-23 RX ADMIN — ATORVASTATIN CALCIUM 40 MG: 40 TABLET, FILM COATED ORAL at 19:49

## 2025-06-23 RX ADMIN — EPOETIN ALFA-EPBX 5000 UNITS: 3000 INJECTION, SOLUTION INTRAVENOUS; SUBCUTANEOUS at 09:40

## 2025-06-23 RX ADMIN — OXYCODONE HYDROCHLORIDE 10 MG: 10 TABLET ORAL at 08:56

## 2025-06-23 RX ADMIN — HEPARIN SODIUM 5000 UNITS: 5000 INJECTION, SOLUTION INTRAVENOUS; SUBCUTANEOUS at 17:27

## 2025-06-23 RX ADMIN — CEFAZOLIN SODIUM 1 G: 1 INJECTION, SOLUTION INTRAVENOUS at 15:47

## 2025-06-23 RX ADMIN — OXYCODONE HYDROCHLORIDE 10 MG: 10 TABLET ORAL at 05:48

## 2025-06-23 RX ADMIN — AMLODIPINE BESYLATE 10 MG: 10 TABLET ORAL at 05:46

## 2025-06-23 RX ADMIN — INSULIN LISPRO 1 UNITS: 100 INJECTION, SOLUTION INTRAVENOUS; SUBCUTANEOUS at 05:53

## 2025-06-23 RX ADMIN — ACETAMINOPHEN 650 MG: 325 TABLET ORAL at 05:41

## 2025-06-23 RX ADMIN — METOLAZONE 5 MG: 5 TABLET ORAL at 05:42

## 2025-06-23 RX ADMIN — CHOLESTYRAMINE 4 G: 4 POWDER, FOR SUSPENSION ORAL at 10:03

## 2025-06-23 RX ADMIN — ASPIRIN 81 MG: 81 TABLET, COATED ORAL at 05:45

## 2025-06-23 RX ADMIN — HEPARIN SODIUM 5000 UNITS: 5000 INJECTION, SOLUTION INTRAVENOUS; SUBCUTANEOUS at 05:48

## 2025-06-23 RX ADMIN — TORSEMIDE 100 MG: 100 TABLET ORAL at 05:49

## 2025-06-23 RX ADMIN — OXYCODONE HYDROCHLORIDE 10 MG: 10 TABLET ORAL at 01:41

## 2025-06-23 RX ADMIN — LOSARTAN POTASSIUM 100 MG: 50 TABLET, FILM COATED ORAL at 05:45

## 2025-06-23 RX ADMIN — ACETAMINOPHEN 650 MG: 325 TABLET ORAL at 01:41

## 2025-06-23 RX ADMIN — CHOLESTYRAMINE 4 G: 4 POWDER, FOR SUSPENSION ORAL at 17:23

## 2025-06-23 ASSESSMENT — ENCOUNTER SYMPTOMS
BACK PAIN: 1
NAUSEA: 0
WEAKNESS: 0
EYE PAIN: 0
BLURRED VISION: 0
COUGH: 0
VOMITING: 0
TINGLING: 1
ABDOMINAL PAIN: 0
CHILLS: 0
INSOMNIA: 0
BACK PAIN: 0
DIZZINESS: 0
DIARRHEA: 0
FALLS: 0
SHORTNESS OF BREATH: 0
LOSS OF CONSCIOUSNESS: 0
HEADACHES: 0
CONSTIPATION: 0
PALPITATIONS: 0
MYALGIAS: 1
NERVOUS/ANXIOUS: 0
FEVER: 1
FOCAL WEAKNESS: 0
SENSORY CHANGE: 0

## 2025-06-23 ASSESSMENT — PAIN DESCRIPTION - PAIN TYPE
TYPE: ACUTE PAIN
TYPE: ACUTE PAIN;CHRONIC PAIN

## 2025-06-23 ASSESSMENT — FIBROSIS 4 INDEX
FIB4 SCORE: 3.41

## 2025-06-23 ASSESSMENT — LIFESTYLE VARIABLES: SUBSTANCE_ABUSE: 0

## 2025-06-23 NOTE — CARE PLAN
The patient is Stable - Low risk of patient condition declining or worsening    Shift Goals  Clinical Goals: pain management, HD  Patient Goals: pain management, echo  Family Goals: mima    Progress made toward(s) clinical / shift goals:      Problem: Hemodynamics  Goal: Patient's hemodynamics, fluid balance and neurologic status will be stable or improve  Outcome: Progressing  Note: Vital signs stable, WCTM.     Problem: Respiratory  Goal: Patient will achieve/maintain optimum respiratory ventilation and gas exchange  Outcome: Progressing  Note: Pt maintaining oxygen saturations >92% on room air.     Problem: Pain - Standard  Goal: Alleviation of pain or a reduction in pain to the patient’s comfort goal  Outcome: Progressing  Note: PRN pain meds given per eMAR w/ good relief of symptoms.       Patient is not progressing towards the following goals:

## 2025-06-23 NOTE — PROGRESS NOTES
Hospital Medicine Daily Progress Note    Date of Service  6/23/2025    Chief Complaint  Beni Moore is a 54 y.o. male admitted 6/18/2025 with fever.    Hospital Course    Beni Moore is a 54 y.o. male who presented 6/18/2025 with fever.  Very pleasant man with history of ESRD on HD, DM 2, left BKA, R TMA, prior alcohol and tobacco use in remission, hypertension. Patient presented from dialysis clinic for feeling ill, fever. He is on week 3 of home dialysis training for plan to transition to home dialysis in the near future. Patient on presentation noted to have fever, right leg cellulitis, admitted for treatment and evaluation of fever. Patient found to have blood culture positive for MSSA bacteremia, right leg wound culture also positive for MSSA. Initially also treated for possible pneumonia, however no pneumonia symptoms so azithromycin discontinued. Repeat blood cultures 6/20 no growth to date. Patient on ancef per infectious disease. Due to new back pain, MRI L spine done showing T11/12 and L1/L2 abnormal signaling, possible infectious discitis. Echocardiogram shows known mitral regurgitation and tricuspid regurgitation, however anterior mitral leaflet thicker in size, cannot rule out endocarditis. Cardiology consulted for MACKENZIE.  On discharge, plan to transition back to in center hemodialysis, as opposed to planned home dialysis as he cannot receive IV antibiotics with home dialysis. Nephrology and dialysis coordinator working on setting up dialysis center restart.      Interval Problem Update  No acute events overnight.  Patient feels well, has no complaints.  MRI L spine shows discitis, discussed with ID, no need for IR biopsy, will presume infection. Patient will need 6 weeks IV antibiotics.  Echocardiogram done showing thickened anterior mitral leaflet compared to prior echo, cannot rule out endocarditis.  Cardiology consulted for MACKENZIE.  Continue ancef for MSSA bacteremia per ID.  Patient will receive  IV antibiotics at outpatient dialysis center on dialysis days. Nephrology coordinating dialysis center restart.  Continue HD here as inpatient per nephrology.  Appreciate ID recommendations after MACKENZIE is completed. Plan to discharge to home once medically cleared.      I have discussed this patient's plan of care and discharge plan at IDT rounds today with Case Management, Nursing, Nursing leadership, and other members of the IDT team.    Consultants/Specialty  infectious disease and nephrology    Code Status  Full Code    Disposition  Medically Cleared  I have placed the appropriate orders for post-discharge needs.    Review of Systems  Review of Systems   Constitutional:  Positive for fever. Negative for chills.   Eyes:  Negative for blurred vision and pain.   Respiratory:  Negative for cough and shortness of breath.    Cardiovascular:  Negative for chest pain, palpitations and leg swelling.   Gastrointestinal:  Negative for abdominal pain, nausea and vomiting.   Genitourinary:  Negative for dysuria and urgency.   Musculoskeletal:  Negative for back pain and falls.   Skin:  Negative for itching and rash.   Neurological:  Positive for tingling. Negative for dizziness, sensory change, focal weakness, loss of consciousness and headaches.   Psychiatric/Behavioral:  Negative for substance abuse. The patient does not have insomnia.         Physical Exam  Temp:  [36.3 °C (97.4 °F)-36.8 °C (98.2 °F)] 36.7 °C (98 °F)  Pulse:  [] 65  Resp:  [16-18] 16  BP: (138-168)/(78-86) 145/78  SpO2:  [94 %-99 %] 99 %    Physical Exam  Constitutional:       General: He is not in acute distress.     Appearance: He is not ill-appearing.   HENT:      Head: Normocephalic and atraumatic.      Right Ear: External ear normal.      Left Ear: External ear normal.      Mouth/Throat:      Pharynx: No oropharyngeal exudate or posterior oropharyngeal erythema.   Eyes:      Extraocular Movements: Extraocular movements intact.      Pupils: Pupils  are equal, round, and reactive to light.   Cardiovascular:      Rate and Rhythm: Normal rate and regular rhythm.      Pulses: Normal pulses.      Heart sounds: Normal heart sounds.   Pulmonary:      Effort: Pulmonary effort is normal. No respiratory distress.      Breath sounds: Normal breath sounds. No wheezing or rales.   Abdominal:      General: Bowel sounds are normal. There is no distension.      Palpations: Abdomen is soft.      Tenderness: There is no abdominal tenderness.   Musculoskeletal:         General: Signs of injury present. No swelling or tenderness. Normal range of motion.      Cervical back: Normal range of motion and neck supple.      Right lower leg: No edema.      Left lower leg: No edema.      Comments: Left BKA, R TMA; right anterior shin wound with surrounding erythema   Skin:     General: Skin is warm and dry.   Neurological:      General: No focal deficit present.      Mental Status: He is oriented to person, place, and time.      Cranial Nerves: No cranial nerve deficit.      Motor: No weakness.   Psychiatric:         Mood and Affect: Mood normal.         Behavior: Behavior normal.         Fluids    Intake/Output Summary (Last 24 hours) at 6/23/2025 1530  Last data filed at 6/23/2025 1245  Gross per 24 hour   Intake 980 ml   Output 4500 ml   Net -3520 ml        Laboratory  Recent Labs     06/21/25  0459   WBC 4.8   RBC 3.15*   HEMOGLOBIN 9.1*   HEMATOCRIT 29.0*   MCV 92.1   MCH 28.9   MCHC 31.4*   RDW 48.2   PLATELETCT 93*   MPV 10.3     Recent Labs     06/21/25  0459 06/22/25  0408 06/23/25  0248   SODIUM 130* 134* 134*   POTASSIUM 4.1 3.8 3.9   CHLORIDE 95* 96 96   CO2 21 24 26   GLUCOSE 124* 153* 205*   BUN 56* 41* 30*   CREATININE 8.32* 6.89* 5.82*   CALCIUM 7.9* 8.0* 7.9*                     Imaging  EC-ECHOCARDIOGRAM COMPLETE W/O CONT   Final Result      MR-LUMBAR SPINE-WITH & W/O   Final Result      There is abnormal T2 hyperintensity and contrast enhancement of midportion of the  T11-12 disc. There is focal adjacent endplate irregularities with contrast enhancement. There is also abnormal T2 hyperintensity of the midportion of L1-2 disc. There is    also endplate irregularity ,contrast enhancement and edema noted involving the adjacent superior endplate of L2. In view of history of MSSA bacteremia, these findings are suspicious for early T11-12 and L1-2 infection. However erosive spondyloarthropathy    in the setting of end-stage renal failure may produce similar finding. There is no periventricular fluid collection. There is no epidural fluid collection.      DX-CHEST-PORTABLE (1 VIEW)   Final Result      Persistent right pleural effusion and right basilar atelectasis/infiltrate with some new opacity in the left midlung field laterally.      EC-MACKENZIE W/O CONT    (Results Pending)        Assessment/Plan  * Cellulitis- (present on admission)  Assessment & Plan  Right leg appears red and warm however difficult to distinguish this from his baseline venous stasis, left leg feels warm as well and patient is febrile so the warmth may be due to his general fever.   Continue current antibiotics, ancef  Follow cultures  ID following  Clinically improving    Atrial fibrillation (HCC)  Assessment & Plan  New diagnosis  Echo reviewed  TSH normal  Cardiology reviewed EKG, does not believe patient has a fib  Can delete problem a fib tomorrow, here for documentation purposes today    Hypertensive emergency  Assessment & Plan  Blood pressure up to 210/110  Asymptomatic  I have increased his amlodipine and continue losartan as well as ordered IV antihypertensives, continue to monitor and titrate medications appropriately  improved    Pneumonia due to infectious organism  Assessment & Plan  Infiltrates on chest x-ray and patient has started to develop cough, has fever and elevated procalcitonin  ID consulted  Will continue these antibiotics  Blood culture growing MSSA, repeat blood cultures pending    MSSA  bacteremia- (present on admission)  Assessment & Plan  Blood culture with MSSA  Hx of strep bacteremia in April, completed antibiotics then  MRI L spine shows T11/T12 and L1/L2 discitis, per ID will presume infection, no need for IR biopsy  Echo shows thickened anterior mitral valve leaflet, cannot rule out endocarditis; cardiology consulted for MACKENZIE  ID following  Continue ancef  Plan for likely 6 week IV antibiotic course at dialysis center    ESRD (end stage renal disease) on dialysis ([facility])- (present on admission)  Assessment & Plan  Cannot tolerate dialysis session due to feeling ill and febrile and was referred to ED  He follows with Dr. Ortiz and I have consulted him, nephrology will follow for HD needs and other recommendations, appreciated    Sepsis ([facility])- (present on admission)  Assessment & Plan  This is Sepsis Present on admission  SIRS criteria identified on my evaluation include: Fever, with temperature greater than 100.9 deg F and Tachycardia, with heart rate greater than 90 BPM  Clinical indicators of end organ dysfunction include Lactic Acid greater than 2  Source is pneumonia and/or cellulitis  Sepsis protocol initiated  Crystalloid Fluid Administration: Resuscitation volume of per EDP ordered. Reason that resuscitation volume of less than 30ml/kg was ordered concern for causing harm given ESRD  IV antibiotics as appropriate for source of sepsis  Reassessment: I have reassessed the patient's hemodynamic status    Type 2 diabetes mellitus with kidney complication, with long-term current use of insulin ([facility])- (present on admission)  Assessment & Plan  Stop lantus as not needing much insulin, continue ISS         VTE prophylaxis: VTE Selection    I have performed a physical exam and reviewed and updated ROS and Plan today (6/23/2025). In review of yesterday's note (6/22/2025), there are no changes except as documented above.    My total time spent caring for the patient on the day of the encounter was  53 minutes. This time includes reviewing the hospital chart vitals, lab tests, and imaging; counseling and educating the patient about their diagnoses; coordinating care with the nurse, pharmacist, and specialists; documenting clinical information in the electronic medical record.  This does not include time spent on separately billable procedures/tests.

## 2025-06-23 NOTE — CARE PLAN
The patient is Stable - Low risk of patient condition declining or worsening    Shift Goals  Clinical Goals: pain management, VSS, monitor labs  Patient Goals: pain management, echo  Family Goals: mima    Progress made toward(s) clinical / shift goals:        Problem: Knowledge Deficit - Standard  Goal: Patient and family/care givers will demonstrate understanding of plan of care, disease process/condition, diagnostic tests and medications  Outcome: Progressing     Problem: Hemodynamics  Goal: Patient's hemodynamics, fluid balance and neurologic status will be stable or improve  Outcome: Progressing     Problem: Fluid Volume  Goal: Fluid volume balance will be maintained  Outcome: Progressing     Problem: Respiratory  Goal: Patient will achieve/maintain optimum respiratory ventilation and gas exchange  Outcome: Progressing     Problem: Skin Integrity  Goal: Skin integrity is maintained or improved  Outcome: Progressing     Problem: Fall Risk  Goal: Patient will remain free from falls  Outcome: Progressing     Problem: Pain - Standard  Goal: Alleviation of pain or a reduction in pain to the patient’s comfort goal  Outcome: Progressing

## 2025-06-23 NOTE — DOCUMENTATION QUERY
"                                                                         [facility]                                                                       Query Response Note      PATIENT:               HELIO BARROW  ACCT #:                  [id number]  MRN:                     [id number]  :                      1970  ADMIT DATE:       2025 11:20 AM  DISCH DATE:          RESPONDING  PROVIDER #:        [id number]           QUERY TEXT:    There is conflicting documentation with regards to the diagnosis of Acute hypoxic respiratory failure    Acute hypoxic respiratory failure is documented in the Consult .  Hypoxia is documented in the ED .    Please provide the most accurate diagnosis based upon the clinical indicators and treatment.      Note: If a query response diagnosis is provided, please include it in your daily notes & DC Summary    Cherie Landaverde  RN-CDIS  [address] [place] [address]  [phone]  [email or web address]  Connect via [email or web address] Messenger    The patient's Clinical Indicators include:  ED: \"...slightly hypoxic requiring oxygen supplementation...\"    Cons  \"Acute hypoxic respiratory failure...normal work of breathing...\"  PN : No respiratory distress.     [id number] Spo2 94-90% RA          1151 Spo2 98% 2L           1200 Spo2 92% 2L (PF Ratio 64/0.95=885)          1356 Spo2 96% 2L          1700 Spo2 97% 1L   0412 Spo2 93% RA          1100 Spo2 96% 1.5L    Risk Factors: Sepsis, Pneumonia, anemia, ESRD  Treatment: Supplemental O2, azithromycin, ampicillin/sulbactam, cefazolin  Options provided:   -- Hypoxia, without respiratory failure   -- Acute hypoxic respiratory failure Ruled Out   -- Acute hypoxic respiratory failure Ruled In   -- Other explanation, (please specify other explanation)   -- Unable to determine      Query created by: Cherie Landaverde on 2025 4:47 AM    RESPONSE TEXT:    Hypoxia, without respiratory failure          Electronically signed by:  SARAH HOGUE " MD 6/23/2025 11:05 AM

## 2025-06-23 NOTE — PROGRESS NOTES
Infectious Disease Progress Note    Author: Mona Palma M.D. Date & Time of service: 2025  10:15 AM    Chief Complaint:  Follow-up for MSSA bacteremia     Interval History:   Tmax 102.1, WBC 4.7.  Patient feeling better today.  He is considering going back to outpatient hemodialysis   AF, O2 RA, getting dialysis today, sleeping appears comfortable.     Review of Systems:  Review of Systems   Respiratory:  Negative for cough and shortness of breath.    Gastrointestinal:  Negative for abdominal pain, constipation, diarrhea, nausea and vomiting.   Musculoskeletal:  Positive for back pain, joint pain and myalgias.   Neurological:  Negative for weakness.   Psychiatric/Behavioral:  The patient is not nervous/anxious.        Hemodynamics:  Temp (24hrs), Av.5 °C (97.7 °F), Min:36.1 °C (97 °F), Max:36.8 °C (98.2 °F)  Temperature: 36.3 °C (97.4 °F)  Pulse  Av.1  Min: 67  Max: 127   Blood Pressure: (!) 154/80       Physical Exam:  Physical Exam  Cardiovascular:      Rate and Rhythm: Normal rate.   Pulmonary:      Effort: Pulmonary effort is normal.   Abdominal:      General: Abdomen is flat.   Skin:     General: Skin is warm.   Neurological:      General: No focal deficit present.      Mental Status: He is oriented to person, place, and time.   Psychiatric:         Mood and Affect: Mood normal.         Behavior: Behavior normal.         Meds:    Current Facility-Administered Medications:     epoetin    epoetin    loperamide    cholestyramine    ceFAZolin    atorvastatin    heparin    [Held by provider] aspirin    amLODIPine    losartan    metOLazone    torsemide    omeprazole    labetalol    [DISCONTINUED] insulin GLARGINE **AND** insulin lispro **AND** POC blood glucose manual result **AND** NOTIFY MD and PharmD **AND** Administer 20 grams of glucose (approximately 8 ounces of fruit juice) every 15 minutes PRN FSBG less than 70 mg/dL **AND** dextrose bolus    [COMPLETED] acetaminophen **FOLLOWED  BY** acetaminophen    oxyCODONE immediate-release **OR** oxyCODONE immediate-release **OR** HYDROmorphone    epoetin    NS    lidocaine    lidocaine-prilocaine    Labs:  Recent Labs     06/21/25  0459   WBC 4.8   RBC 3.15*   HEMOGLOBIN 9.1*   HEMATOCRIT 29.0*   MCV 92.1   MCH 28.9   RDW 48.2   PLATELETCT 93*   MPV 10.3     Recent Labs     06/21/25  0459 06/22/25  0408 06/23/25  0248   SODIUM 130* 134* 134*   POTASSIUM 4.1 3.8 3.9   CHLORIDE 95* 96 96   CO2 21 24 26   GLUCOSE 124* 153* 205*   BUN 56* 41* 30*     Recent Labs     06/21/25 0459 06/22/25  0408 06/23/25  0248   ALBUMIN 3.3 3.4 3.3   CREATININE 8.32* 6.89* 5.82*       Imaging:  MR-LUMBAR SPINE-WITH & W/O  Result Date: 6/22/2025 6/22/2025 5:39 PM HISTORY/REASON FOR EXAM:  lower back pain, setting of MSSA bacteremia, evaluate for infection. TECHNIQUE/EXAM DESCRIPTION: MRI of the lumbar spine without and with contrast. Multiplanar multisequence MR examination of the lumbar spine. 20 mL ProHance contrast was administered intravenously. COMPARISON:  None. FINDINGS: There is abnormal T2 hyperintensity and contrast enhancement of midportion of the T11-12 disc. There is focal adjacent endplate irregularities with contrast enhancement. There is also abnormal T2 hyperintensity of midportion of L1-2 disc. There is also endplate irregularity and contrast enhancement and edema noted involving the adjacent superior endplate of L2. There is no periventricular fluid collection. There is no epidural fluid collection. At the level of L5-S1,there is no spinal or neural foraminal stenosis. At the level of L4-5,there is minimal disc bulge. There is no spinal or neural foraminal stenosis. At the level of L3-4,there is no spinal or neural foraminal stenosis. At the level of L2-3,there is no spinal or neural foraminal stenosis. At the level of L1-2,there is no spinal or neural foraminal stenosis. The conus terminates at the level of L1. The visualized lower thoracic spinal cord  is unremarkable. There is no lumbar epidural lesion. The visualized pre-and paraspinal soft tissues appear normal. There is no abnormal contrast enhancement.     There is abnormal T2 hyperintensity and contrast enhancement of midportion of the T11-12 disc. There is focal adjacent endplate irregularities with contrast enhancement. There is also abnormal T2 hyperintensity of the midportion of L1-2 disc. There is also endplate irregularity ,contrast enhancement and edema noted involving the adjacent superior endplate of L2. In view of history of MSSA bacteremia, these findings are suspicious for early T11-12 and L1-2 infection. However erosive spondyloarthropathy  in the setting of end-stage renal failure may produce similar finding. There is no periventricular fluid collection. There is no epidural fluid collection.    DX-CHEST-PORTABLE (1 VIEW)  Result Date: 6/18/2025 6/18/2025 11:34 AM HISTORY/REASON FOR EXAM:  Sepsis. TECHNIQUE/EXAM DESCRIPTION AND NUMBER OF VIEWS: Single portable view of the chest. COMPARISON: 5/13/2025 FINDINGS: The cardiomediastinal silhouette is stable. There is persistent right pleural effusion and right basilar atelectasis. There are some increased markings in left midlung field laterally which may be due to atelectasis or infiltrate. There is no definite left pleural effusion or pneumothorax.     Persistent right pleural effusion and right basilar atelectasis/infiltrate with some new opacity in the left midlung field laterally.      Micro:  Results       Procedure Component Value Units Date/Time    Blood Culture - Draw one from central line and one from peripheral site [[id number]]  (Abnormal) Collected: 06/18/25 1207    Order Status: Completed Specimen: Blood from Peripheral Updated: 06/21/25 1004     Significant Indicator POS     Source BLD     Site PERIPHERAL     Culture Result Growth detected by automated blood culture system. 06/19/2025  01:44  Staphylococcus aureus (methicillin  sensitive)  detected by PCR.        Staphylococcus aureus  See previous culture for sensitivity report.      Blood Culture - Draw one from central line and one from peripheral site [[id number]]  (Abnormal)  (Susceptibility) Collected: 06/18/25 1150    Order Status: Completed Specimen: Blood from Line Updated: 06/21/25 1004     Significant Indicator POS     Source BLD     Site Peripheral     Culture Result Growth detected by automated blood culture system. 06/19/2025  01:46        Staphylococcus aureus    Susceptibility       Staphylococcus aureus (1)       Antibiotic Interpretation Microscan   Method Status    Ampicillin/sulbactam Sensitive <=8/4 mcg/mL IRINEO Final    Clindamycin Sensitive 0.5 mcg/mL IRINEO Final    Cefazolin Sensitive <=8 mcg/mL IRINEO Final    Cefepime Sensitive <=4 mcg/mL IRINEO Final    Daptomycin Sensitive 1 mcg/mL IRINEO Final    Erythromycin Resistant >4 mcg/mL IRINEO Final    Oxacillin Sensitive <=0.25 mcg/mL IRINEO Final    Trimeth/Sulfa Sensitive <=0.5/9.5 mcg/mL IRINEO Final    Tetracycline Sensitive <=4 mcg/mL IRINEO Final    Vancomycin Sensitive 1 mcg/mL IRINEO Final                       CULTURE WOUND W/ GRAM STAIN [[id number]]  (Abnormal)  (Susceptibility) Collected: 06/18/25 1150    Order Status: Completed Specimen: Wound from Right Leg Updated: 06/20/25 1212     Significant Indicator POS     Source WND     Site RIGHT LEG     Culture Result Light growth usual skin mable.     Gram Stain Result No organisms seen.     Culture Result Staphylococcus aureus  Light growth  Methicillin sensitive by screening method      Susceptibility       Staphylococcus aureus (1)       Antibiotic Interpretation Microscan   Method Status    Ampicillin/sulbactam Sensitive <=8/4 mcg/mL IRINEO Final    Clindamycin Sensitive <=0.25 mcg/mL IRINEO Final    Cefazolin Sensitive <=8 mcg/mL IRINEO Final    Cefepime Sensitive <=4 mcg/mL IRINEO Final    Daptomycin Sensitive <=0.5 mcg/mL IRINEO Final    Erythromycin Resistant >4 mcg/mL IRINEO Final    Oxacillin  Sensitive 0.5 mcg/mL IRINEO Final    Trimeth/Sulfa Sensitive <=0.5/9.5 mcg/mL IRINEO Final    Tetracycline Sensitive <=4 mcg/mL IRINEO Final    Vancomycin Sensitive 1 mcg/mL IRINEO Final                       BLOOD CULTURE [[id number]] Collected: 06/20/25 0330    Order Status: Completed Specimen: Blood from Peripheral Updated: 06/20/25 0608     Significant Indicator NEG     Source BLD     Site PERIPHERAL     Culture Result No Growth  Note: Blood cultures are incubated for 5 days and  are monitored continuously.Positive blood cultures  are called to the RN and reported as soon as  they are identified.      BLOOD CULTURE [[id number]] Collected: 06/20/25 0336    Order Status: Completed Specimen: Blood from Peripheral Updated: 06/20/25 0608     Significant Indicator NEG     Source BLD     Site PERIPHERAL     Culture Result No Growth  Note: Blood cultures are incubated for 5 days and  are monitored continuously.Positive blood cultures  are called to the RN and reported as soon as  they are identified.      MRSA By PCR (Amp) [[id number]] Collected: 06/18/25 1456    Order Status: Completed Specimen: Respirate from Nares Updated: 06/18/25 1848     MRSA by PCR Negative    GRAM STAIN [[id number]] Collected: 06/18/25 1150    Order Status: Completed Specimen: Wound Updated: 06/18/25 1420     Significant Indicator .     Source WND     Site RIGHT LEG     Gram Stain Result No organisms seen.    CoV-2, FLU A/B, and RSV by PCR (2-4 Hours CEPHEID) : Collect NP swab in VTM [[id number]] Collected: 06/18/25 1150    Order Status: Completed Specimen: Respirate Updated: 06/18/25 1300     Influenza virus A RNA Negative     Influenza virus B, PCR Negative     RSV, PCR Negative     SARS-CoV-2 by PCR NotDetected     Comment: [facility] providers: PLEASE REFER TO DE-ESCALATION AND RETESTING PROTOCOL  on [email or web address]    **The [employer] Video GeneXpert Xpress SARS-CoV-2 RT-PCR Test has been made  available for use under the Emergency Use Authorization (EUA) only.           SARS-CoV-2 Source NP Swab    Urinalysis [[id number]] Collected: 06/18/25 0000    Order Status: Canceled Specimen: Urine     Urine Culture (New) [[id number]] Collected: 06/18/25 0000    Order Status: Canceled Specimen: Urine             Assessment:  Active Hospital Problems    Diagnosis     *Cellulitis [L03.90]     Pneumonia due to infectious organism [J18.9]     Hypertensive emergency [I16.1]     Atrial fibrillation (HCC) [I48.91]     MSSA bacteremia [R78.81, B95.61]     Sepsis (HCC) [A41.9]     ESRD (end stage renal disease) on dialysis ([facility]) [N18.6, Z99.2]     Type 2 diabetes mellitus with kidney complication, with long-term current use of insulin ([facility]) [E11.29, Z79.4]      Interval 24 hours:      AF, O2 RA  Labs reviewed  New Imaging personally reviewed both images and report.  Studies reviewed  Micro reviewed  Notes from primary team and specialists reviewed    Pt with hemodialysis today.  States he has some ongoing back pain.  Patient also with left shoulder pain but states it is chronic and unchanged.    Antibiotics as below.       Assessment:  54 y.o. male with a history of ESRD on hemodialysis, type 2 diabetes mellitus, history of group A streptococcus bacteremia in March secondary to cellulitis admitted on 6/18/2025 secondary to fever and malaise.  Patient now found to have MSSA bacteremia.  Source likely cellulitis and wound infection.       Pertinent diagnoses:  MSSA bacteremia  Acute hypoxic respiratory failure  Right lower extremity cellulitis, wound cultures on 6/18 positive for MSSA  Pneumonia  Thoracic spine discitis  -MRI with contrast on 6/22 notes enhancement at T11-12 disc as well as L1-2.  Findings suspicious for early T11-L2 infection.  No periventricular fluid collection, no epidural fluid collection.  Thrombocytopenia  ESRD on hemodialysis  -Per nephrology patient struggled with high intradialytic weight gains previously and so had transition to home dialysis 5 days a week and cannulate his  own fistula.  Type 2 diabetes mellitus  Right TMA  Left BKA  History of group A streptococcus bacteremia in March 2025  History of left shoulder hardware in place, chronic pain in the shoulder which he states is unchanged      Plan:  -Continue IV cefazolin 1 g daily while inpatient, on DC could potentially utilize antibiotics with dialysis but he has been on home dialysis will need to see if he can go back into the dialysis center or how this can be managed.  Home infusion daily after dialysis is an option but this would require a central line.  --I considered rifampin given the hardware in the left shoulder.  However, patient with several drug drug interactions so will not initiate  --Recommend a 6-week IV antibiotic course due to infection in the spine as well as bacteremia, end 8/1/25   --- After the 6-week course we will either stop antibiotics or potentially transition to orals for another 2 to 3 months.  Will not repeat MRI spine unless new symptoms such as worsening back pain.  --Continue to monitor for any new head, neck back or joint pain particular the left shoulder and if concerning may need dedicated imaging  -Blood cultures on 6/18+ MSSA  -Follow repeat blood cultures on 6/20-negative to date  -Right lower extremity wound culture+ MSSA  -Follow TTE, pending      This infection pose a threat to life     Disposition: TBD.  Will need to determine if patient will do outpatient hemodialysis versus hemodialysis 5 times a week     Need for tunneled catheter: TBD     Follow-up in the ID clinic with NP     Discussed with internal medicine/Dr. Patiño.  ID will continue to follow

## 2025-06-23 NOTE — CONSULTS
Cardiology Initial Consult Note    Reason for Consult:  Asked by Dr Candido Patiño M.D. to see this patient with MSSA bacteremia, abnormal echocardiogram  Patient's PCP: Florinda Pascual M.D.    CC:   Chief Complaint   Patient presents with    Fever     BIB [facility] from dialysis after patient had fatigue and shivering during session today. He got most of his session. He has wound to right lower extremity, PMH DM with left aka. He was given 650mg tylenol for fever PTA. They placed him on 3L NC for comfort, but sating well on RA on arrival       HPI:   54-year-old man with past medical history significant for ESRD on hemodialysis, type 2 diabetes mellitus, prior alcohol and tobacco use, hypertension who was admitted to the hospital on 6/18/2025 with fevers.  Infectious workup was pursued.  He was started on broad-spectrum antibiotics.  Blood cultures were positive for MSSA.  He is currently being seen by infectious disease for management of his bacteremia.    He had transthoracic echocardiogram performed today and was found to have moderate mitral regurgitation and thickening of the mitral valve leaflet.  Also noted to have moderate to severe tricuspid regurgitation.  Given MSSA bacteremia, cardiology consulted for assessment and transesophageal echocardiogram.    Medications / Drug list prior to admission:  Medications Ordered Prior to Encounter[1]    Current list of administered Medications:  Current Medications[2]    Past Medical History[3]    Past Surgical History[4]    Family History   Problem Relation Age of Onset    No Known Problems Mother     Diabetes Father         Adult onset    Heart Disease Father     Hypertension Father     Diabetes Maternal Grandmother     Heart Disease Maternal Grandfather     Lung Disease Paternal Grandmother     Cancer Paternal Grandmother     Cancer Paternal Grandfather     Lung Cancer Paternal Grandfather     Kidney Disease Neg Hx      Patient family history was personally reviewed, no  pertinent family history to current presentation    Social History[5]    ALLERGIES:  Allergies[6]    Review of systems:  A complete review of symptoms was reviewed with the patient. This is reviewed in H&P and PMH. ALL OTHERS reviewed and negative    Physical exam:  Patient Vitals for the past 24 hrs:   BP Temp Temp src Pulse Resp SpO2 Weight   06/23/25 1250 (!) 145/78 36.7 °C (98 °F) Temporal 65 16 99 % --   06/23/25 1200 (!) 141/82 36.5 °C (97.7 °F) Temporal 71 17 96 % --   06/23/25 0900 138/81 36.6 °C (97.9 °F) Temporal 67 16 96 % --   06/23/25 0728 (!) 154/80 36.3 °C (97.4 °F) Temporal 76 17 96 % --   06/23/25 0548 -- -- -- -- 18 -- --   06/23/25 0546 (!) 161/83 -- -- -- -- -- --   06/23/25 0545 (!) 161/83 -- -- -- -- -- --   06/23/25 0300 (!) 158/79 36.7 °C (98.1 °F) Temporal (!) 105 18 95 % 100 kg (220 lb 10.9 oz)   06/23/25 0139 -- -- -- 70 18 95 % --   06/22/25 2041 -- -- -- -- 16 -- --   06/22/25 1928 (!) 157/82 36.8 °C (98.2 °F) Temporal 71 18 95 % --   06/22/25 1618 (!) 168/86 36.5 °C (97.7 °F) Temporal 69 17 94 % --     General: Not in acute distress, lying comfortably in bed  EYES: No jaundice  HEENT: OP clear   Neck:  No carotid bruits, +JVD  CVS:  RRR, Normal S1, S2. +2/6 holosystolic murmur at LSB  Resp: Normal respiratory effort, lungs CTA bilaterally. No rales or rhonchi  Abdomen: Soft, non-distended  Skin: No obvious rashes, no cyanosis  Neurological: Alert and oriented x3, moves all extremities, no focal neurologic deficits  Psychiatric: Appropriate affect  Extremities:   Extremities warm, +left BKA, right MTA, no significant edema      Data:  Laboratory studies personally reviewed by me:  Recent Results (from the past 24 hours)   POCT glucose device results    Collection Time: 06/22/25  4:46 PM   Result Value Ref Range    POC Glucose, Blood 158 (H) 65 - 99 mg/dL   POCT glucose device results    Collection Time: 06/22/25  8:47 PM   Result Value Ref Range    POC Glucose, Blood 172 (H) 65 - 99  mg/dL   Renal Function Panel    Collection Time: 06/23/25  2:48 AM   Result Value Ref Range    Sodium 134 (L) 135 - 145 mmol/L    Potassium 3.9 3.6 - 5.5 mmol/L    Chloride 96 96 - 112 mmol/L    Co2 26 20 - 33 mmol/L    Anion Gap 12.0 7.0 - 16.0    Glucose 205 (H) 65 - 99 mg/dL    Creatinine 5.82 (HH) 0.50 - 1.40 mg/dL    Bun 30 (H) 8 - 22 mg/dL    Calcium 7.9 (L) 8.5 - 10.5 mg/dL    Correct Calcium 8.5 8.5 - 10.5 mg/dL    Phosphorus 3.8 2.5 - 4.5 mg/dL    Albumin 3.3 3.2 - 4.9 g/dL   ESTIMATED GFR    Collection Time: 06/23/25  2:48 AM   Result Value Ref Range    GFR (CKD-EPI) 11 (A) >60 mL/min/1.73 m 2   POCT glucose device results    Collection Time: 06/23/25  5:53 AM   Result Value Ref Range    POC Glucose, Blood 178 (H) 65 - 99 mg/dL   POCT glucose device results    Collection Time: 06/23/25 11:37 AM   Result Value Ref Range    POC Glucose, Blood 127 (H) 65 - 99 mg/dL   EC-ECHOCARDIOGRAM COMPLETE W/O CONT    Collection Time: 06/23/25  1:46 PM   Result Value Ref Range    Eject.Frac. MOD BP 51     Eject.Frac. MOD 4C 52.34     Eject.Frac. MOD 2C 51.63        Imaging:  EC-ECHOCARDIOGRAM COMPLETE W/O CONT   Final Result      MR-LUMBAR SPINE-WITH & W/O   Final Result      There is abnormal T2 hyperintensity and contrast enhancement of midportion of the T11-12 disc. There is focal adjacent endplate irregularities with contrast enhancement. There is also abnormal T2 hyperintensity of the midportion of L1-2 disc. There is    also endplate irregularity ,contrast enhancement and edema noted involving the adjacent superior endplate of L2. In view of history of MSSA bacteremia, these findings are suspicious for early T11-12 and L1-2 infection. However erosive spondyloarthropathy    in the setting of end-stage renal failure may produce similar finding. There is no periventricular fluid collection. There is no epidural fluid collection.      DX-CHEST-PORTABLE (1 VIEW)   Final Result      Persistent right pleural effusion and  right basilar atelectasis/infiltrate with some new opacity in the left midlung field laterally.      EC-MACKENZIE W/O CONT    (Results Pending)       EKG tracings personally reviewed by me shows sinus rhythm with PACs. No evidence of atrial fibrillation    Echo 6/23/2025:  Left ventricular systolic function is low normal. The ejection fraction   is measured to be 51% by Verma's biplane.   Mild to moderate concentric left ventricular hypertrophy. Grade III   diastolic dysfunction (restrictive pattern).  The right ventricle is normal in size and systolic function.  Thickened mitral valve leaflets with moderate mitral regurgitation.   Cannot rule out vegetation on anterior leaflet.   Mild aortic insufficiency.  Moderate to severe tricuspid regurgitation. Estimated right ventricular   systolic pressure is 55 mmHg (moderate pulmonary hypertension).   Mild pulmonic insufficiency.  Compared to the prior study on 3/30/25, anterior leaflet of the mitral   appears more thickened. There is now moderate pulmonary hypertension.   Primary team is notified of findings.    All pertinent features of laboratory and imaging reviewed including primary images where applicable      Principal Problem:    Cellulitis (POA: Yes)  Active Problems:    Type 2 diabetes mellitus with kidney complication, with long-term current use of insulin (HCC) (POA: Yes)    Sepsis (HCC) (POA: Yes)    ESRD (end stage renal disease) on dialysis (HCC) (POA: Yes)    MSSA bacteremia (POA: Yes)    Pneumonia due to infectious organism (POA: Unknown)    Hypertensive emergency (POA: Unknown)    Atrial fibrillation (HCC) (POA: Unknown)  Resolved Problems:    * No resolved hospital problems. *      Assessment / Plan:  MSSA bacteremia  Abnormal Echocardiogram  Moderate mitral regurgitation  Moderate to severe tricuspid regurgitation  Pulmonary hypertension  ESRD on HD  DM2  Pneumonia  HFpEF    Recommendations:  Review of echocardiogram is nondiagnostic for vegetation. There  is mild thickening of the anterior mitral valve leaflet but no definitive mobile echodensity. Will require MACKENZIE for better evaluation and to rule out endocarditis.  Continue IV antibiotics for MSSA bacteremia as managed by ID  Regarding valve regurgitation, this has been seen on prior echocardiograms. Will need ongoing volume optimization with hemodialysis.  Regarding pulmonary hypertension, there is moderate PH. This is in the setting of HFpEF and moderate MR--WHO Group 2. Continue hemodialysis for volume optimization  Risk/benefits of MACKENZIE discussed with patient, he agrees to proceed.  Keep NPO after midnight. Will try to MACKENZIE tomorrow if schedule allows.    The risks, benefits, and alternatives to transesophageal echocardiogram (MACKENZIE) with IV sedation were discussed with the patient in specific detail, including oropharyngeal and esophageal traumas including hoarseness and dysphagia after the procedure. Rare cases demonstrating serious or fatal complications associated with MACKENZIE have been reported in the adult population, including cardiac, pulmonary and bleeding complications in less than 1% of people. Patients with an identified intracardiac thrombus are at increased risk for embolic events (absolute risk uncertain, range 0%-38%), and this appears to be reduced with anticoagulation therapy (absolute risk reduction uncertain). The patient verbalized understanding about these possible complications, and wishes to proceed with this procedure.      I personally discussed his case with  Dr Candido Patiño M.D.    Future Appointments   Date Time Provider Department Center   8/5/2025  9:20 AM Florinda Pascual M.D. [facility]       It is my pleasure to participate in the care of Mr. Moore.  Please do not hesitate to contact me with questions or concerns.    Costa Santana MD  Cardiologist, [facility]    6/23/2025    Please note that this dictation was created using voice recognition software.  I have made every reasonable attempt to correct obvious errors, but it is possible there are errors of grammar and possibly content that I did not discover before finalizing the note.         [1]   No current facility-administered medications on file prior to encounter.     Current Outpatient Medications on File Prior to Encounter   Medication Sig Dispense Refill    atorvastatin (LIPITOR) 80 MG tablet Take 40 mg by mouth every evening. 40 mg = 1/2 tab      insulin regular (HUMULIN R/NOVOLIN R) 100 UNIT/ML Solution Inject 6-12 Units under the skin with meals as needed for High Blood Sugar.      Insulin Degludec 100 UNIT/ML Solution Inject 20 Units under the skin every evening.      Methoxy PEG-Epoetin Beta (MIRCERA) 150 MCG/0.3ML Solution Prefilled Syringe Inject 150 mcg as directed every 4 weeks. [place] ([phone])      Ibuprofen (ADVIL) 200 MG Cap Take 800 mg by mouth one time as needed (pain). 800 mg = 4 caps      NON SPECIFIED Administer 1 Each into both eyes see administration instructions. Eye injections every 4 to 6 weeks from [facility]      atorvastatin (LIPITOR) 40 MG Tab Take 1 Tablet by mouth every evening. 90 Tablet 3    amLODIPine (NORVASC) 2.5 MG Tab Take 3 Tablets by mouth every day. 100 Tablet 3    cholestyramine (QUESTRAN) 4 g packet Take 4 g by mouth 2 times a day for 360 days. 60 Each 11    torsemide (DEMADEX) 100 MG Tab Take 1 Tablet by mouth every day. 90 Tablet 3    losartan (COZAAR) 100 MG Tab Take 1 Tablet by mouth every day. 90 Tablet 3    metOLazone (ZAROXOLYN) 5 MG Tab Take 1 Tablet by mouth every day. 90 Tablet 3    aspirin 81 MG EC tablet Take 1 Tablet by mouth every day. 30 Tablet 2    omeprazole (PRILOSEC) 20 MG delayed-release capsule Take 20 mg by mouth every morning.         azithromycin (ZITHROMAX) 250 MG Tab 2 tablets today then 1 tablet each day for the next 4 more days (Patient taking differently: 2 tablets today then 1 tablet each day for the next 4 more  days  PRESCRIPTION COURSE WAS COMPLETED) 6 Tablet 0   [2]   Current Facility-Administered Medications:     EPOETIN ANDRIA-EPBX 2000 UNIT/ML INJ SOLN, , , ,     EPOETIN ANDRIA-EPBX 3000 UNIT/ML INJ SOLN, , , ,     loperamide (Imodium) capsule 2 mg, 2 mg, Oral, 4X/DAY PRN, Candido Patiño M.D., 2 mg at 06/20/25 0946    cholestyramine (Questran) 4 GM powder 4 g, 1 Packet, Oral, BID, Candido Patiño M.D., 4 g at 06/23/25 1003    ceFAZolin in dextrose (Ancef) IVPB premix 1 g, 1 g, Intravenous, Q24HRS, Ebony Crawford M.D., Stopped at 06/22/25 1635    atorvastatin (Lipitor) tablet 40 mg, 40 mg, Oral, Q EVENING, Candido Patiño M.D., 40 mg at 06/22/25 2041    heparin injection 5,000 Units, 5,000 Units, Subcutaneous, Q12HRS, Nolan Yanes M.D., 5,000 Units at 06/23/25 0548    aspirin EC tablet 81 mg, 81 mg, Oral, DAILY, Candido Patiño M.D., 81 mg at 06/23/25 0545    amLODIPine (Norvasc) tablet 10 mg, 10 mg, Oral, DAILY, Nolan Yanes M.D., 10 mg at 06/23/25 0546    losartan (Cozaar) tablet 100 mg, 100 mg, Oral, DAILY, Nolan Yanes M.D., 100 mg at 06/23/25 0545    metOLazone (Zaroxolyn) tablet 5 mg, 5 mg, Oral, DAILY, Nolan Yanes M.D., 5 mg at 06/23/25 0542    torsemide (Demadex) tablet 100 mg, 100 mg, Oral, DAILY, Nolan Yanes M.D., 100 mg at 06/23/25 0549    omeprazole (PriLOSEC) capsule 20 mg, 20 mg, Oral, QAM, Nolan Yanes M.D., 20 mg at 06/23/25 0545    labetalol (Normodyne/Trandate) injection 10 mg, 10 mg, Intravenous, Q6HRS PRN, Nolan Yanes M.D., 10 mg at 06/18/25 1359    [DISCONTINUED] insulin GLARGINE (Lantus,Semglee) injection, 10 Units, Subcutaneous, Q EVENING, 10 Units at 06/18/25 1821 **AND** insulin lispro (HumaLOG,AdmeLOG) subcutaneous injection, 1-6 Units, Subcutaneous, 4X/DAY ACHS, 1 Units at 06/23/25 0553 **AND** POC blood glucose manual result, , , Q AC AND BEDTIME(S) **AND** NOTIFY MD and PharmD, , , Once **AND** Administer 20 grams of glucose (approximately 8 ounces of  fruit juice) every 15 minutes PRN FSBG less than 70 mg/dL, , , PRN **AND** dextrose 50 % (D50W) injection 25 g, 25 g, Intravenous, Q15 MIN PRN, Nolan Yanes M.D.    [COMPLETED] acetaminophen (Tylenol) tablet 650 mg, 650 mg, Oral, Q6HRS, 650 mg at 06/23/25 0541 **FOLLOWED BY** acetaminophen (Tylenol) tablet 650 mg, 650 mg, Oral, Q6HRS PRN, Nolan Yanes M.D.    oxyCODONE immediate-release (Roxicodone) tablet 5 mg, 5 mg, Oral, Q3HRS PRN, 5 mg at 06/22/25 2041 **OR** oxyCODONE immediate release (Roxicodone) tablet 10 mg, 10 mg, Oral, Q3HRS PRN, 10 mg at 06/23/25 0856 **OR** HYDROmorphone (Dilaudid) injection 0.5 mg, 0.5 mg, Intravenous, Q3HRS PRN, Nolan Yanes M.D.    epoetin (Retacrit) 5,000 Units injection, 5,000 Units, Intravenous, MO, WE + FR, Varun Ortiz M.D., 5,000 Units at 06/23/25 0940    NS (Bolus) 0.9 % infusion 100 mL, 100 mL, Intravenous, DIALYSIS PRN, Varun Ortiz M.D.    lidocaine (Xylocaine) 1 % injection 1 mL, 1 mL, Intradermal, DIALYSIS PRN, Varun Ortiz M.D.    lidocaine-prilocaine (Emla) 2.5-2.5 % cream, , Topical, DIALYSIS PRN, Varun Ortiz M.D.  [3]   Past Medical History:  Diagnosis Date    Anesthesia     Hypotension post-op, persisted for a few months x 1    Anesthesia 12/06/2023    Patient stated after 2016 colonoscopy he became hypotensive.    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    Heart burn     GERD; takes Prilosec    Hemorrhagic disorder (HCC)     ASA protection for stent and cardiac history    High cholesterol     medicated x 2    History of non-ST elevation myocardial infarction (NSTEMI)     Per Problem List    Hx of heart artery stent 2019     Hyperlipidemia     Hyperparathyroidism due to renal insufficiency (HCC)     Per Problem List    Hypertension     medicated    Moderate mitral regurgitation 04/01/2025    Myocardial infarct (HCC) 2019    FOLLOWED BY DR TO    Paroxysmal SVT (supraventricular tachycardia) (HCC) -on telemetry and during MACKENZIE atrial tachycardia likely 04/01/2025    Pneumonia     H/O    Renal disorder 12/06/2023    Stage 3 CKD; 12/9/2024 dialysis at [facility] on Wednesday's only via CVC    Shortness of breath 5/13/2025   [4]   Past Surgical History:  Procedure Laterality Date    AV FISTULA REVISION Left 1/6/2025    Procedure: LEFT BRACHIOBASILIC FISTULA TRANSPOSITION;  Surgeon: Joyce Ureña M.D.;  Location: SURGERY SAME DAY [facility];  Service: General    AV FISTULA CREATION Left 11/04/2024    Procedure: CREATION OF LEFT UPPER EXTREMITY  DIALYSIS FISTULA;  Surgeon: Joyce Ureña M.D.;  Location: SURGERY SAME DAY [facility];  Service: General    ORIF, FRACTURE, HUMERUS, PROXIMAL Left 08/30/2024    Procedure: LEFT OPEN REDUCTION INTERNAL FIXATION  HUMERUS, PROXIMAL, NON UNION REPAIR;  Surgeon: Aris Pierre M.D.;  Location: SURGERY [facility];  Service: Orthopedics    KNEE AMPUTATION BELOW Left 02/01/2024    Procedure: AMPUTATION, BELOW KNEE;  Surgeon: Celestino Segura M.D.;  Location: [facility];  Service: Orthopedics    TOE AMPUTATION Left 01/30/2024    Procedure: AMPUTATION, TOE- 5TH;  Surgeon: Celestino Segura M.D.;  Location: [facility];  Service: Orthopedics    PB OPEN TREATMENT PBOX HUMERAL FRACTURE Left 12/11/2023    Procedure: LEFT OPEN REDUCTION INTERNAL FIXATION  HUMERUS, PROXIMAL AND LEFT BICEPS TENODESIS;  Surgeon: Aris Pierre M.D.;  Location: SURGERY [facility];  Service: Orthopedics    PB REPAIR BICEPS LONG TENDON Left 12/11/2023    Procedure: TENODESIS, BICEPS, OPEN;  Surgeon: Aris Pierre M.D.;  Location: [facility];  Service: Orthopedics    CATARACT EXTRACTION  WITH IOL Bilateral     TOE AMPUTATION Left 2021    Procedure: AMPUTATION, TOE;  Surgeon: Tomer Hart M.D.;  Location: SURGERY [facility];  Service: Orthopedics    STENT PLACEMENT  2019    STEMI with nonobstructive LAD    TOE AMPUTATION Right 2018    Procedure: Transmetatarsal amputation;  Surgeon: Ravi Bernardo M.D.;  Location: SURGERY [facility];  Service: Orthopedics    ACHILLES TENDON REPAIR Right 2018    Procedure: ACHILLES LENGTHENING;  Surgeon: Ravi Bernardo M.D.;  Location: SURGERY [facility];  Service: Orthopedics    IRRIGATION & DEBRIDEMENT ORTHO Right 2018    Procedure: IRRIGATION & DEBRIDEMENT ORTHO;  Surgeon: Ravi Bernardo M.D.;  Location: SURGERY [facility];  Service: Orthopedics    OTHER CARDIAC SURGERY  2019    Stent installed right side    OTHER ORTHOPEDIC SURGERY      SHOULDER SURGERY  2023    Something is not right in my shoulder/arm    TONSILLECTOMY      VASECTOMY     [5]   Social History  Tobacco Use    Smoking status: Former     Current packs/day: 0.00     Average packs/day: 0.6 packs/day for 17.0 years (10.0 ttl pk-yrs)     Types: Cigarettes     Start date: 1988     Quit date:      Years since quittin.4     Passive exposure: Never    Smokeless tobacco: Never   Vaping Use    Vaping status: Never Used   Substance Use Topics    Alcohol use: Not Currently     Comment: Quit     Drug use: Not Currently     Types: Oral     Comment: NOT CURRENTLY, HISTORY OF CBD   [6] No Known Allergies

## 2025-06-23 NOTE — PROGRESS NOTES
Hospital Medicine Daily Progress Note    Date of Service  11/20/2022    Chief Complaint  Beni Moore is a 52 y.o. male admitted 11/17/2022 with shortness of breath    Hospital Course  Beni Moore is a 52 y.o. male with a history of diabetes, hypertension, dyslipidemia, bilateral toe amputations, coronary artery disease with a history of stent placed 2019, obesity, likely SOBIA, diabetic gastric issues who presented 11/17/2022 with lethargy and increasing weakness as well as 50 pound weight gain over the past year with leg edema.  Also reports of episode of hypoglycemia.  Chest x-ray consistent with volume overload, small right sided pleural effusion, diffuse pleural thickening, atelectasis.  Labs remarkable for elevated BNP, troponin trended flat on 101.  Noted AXEL.  EKG with nonspecific ST changes    Interval Problem Update  11/18/2022  Vitals remained stable.  Currently on room air saturating over 90  Labs reviewed.  Noted AXEL    Continue Lasix  Nephrology evaluation if kidney function continue to worsen(baseline creatinine around 1.4)    Pending echocardiogram    11/19/2022  Vitals stable   On room air   Reports short of breathe   Worsening renal function . Last evauted by Dr Ortiz .  Lucentis held.    Wheezing on exam   Added suo neb and steroid  Echo unremarkable   Continue lasix    Nephrology Dr Plascencia  to evaluate     11/20/2022  Hypertensive  On room air saturating over 90  Kidney function slightly improved  Insulin regimen adjusted    Elevated protein creatinine ratio.  Significant proteinuria 6 g  Resume ACEI tomorrow if kidney function continue improving  Continue Lasix  Nephrology following      I have discussed this patient's plan of care and discharge plan at IDT rounds today with Case Management, Nursing, Nursing leadership, and other members of the IDT team.        Code Status  Full Code    Disposition  Patient is not medically cleared for discharge.   Anticipate discharge to to home with close  75-year-old male with past medical history of HTN, MDD, schizophrenia, dementia presents to the ED for left lower quadrant abdominal pain associated shortness of breath and cough. pt states he has been having this sudden onset of SOB and cough with deep inspiration. States he is an active smoker. Vitals on admission significant for BP of 200/100 and 98% on 2L NC.   RR called overnight for AHRF now on JFNC, upgraded to SDU.      A/P   #Acute hypoxemic resp failure on HHFNC/ NIV/ Aspiration PNA  #COPD exacerbation/ Dysphagia   - pulmonary is following, recommendations noted:   -  HOB @ 45 degrees.  Aspiration precaution  - NIV during sleep and PRN during the day.  Wean O2 as tolerated.  Keep POX target 92-96%  - c/w  Solumedrol 40mg q24.  Nebs q6 PRN.    - CT chest NC    - taper off high flow Oxygen as tolerated   - aspiration precaution   - speech and swallow follow up today, if fails pt will require NGT   - Finish Zosyn course.  FU Cx.  MRSA nares negative.  - prior CT chest noted, no PE  - Nebs Q 6 hours PRN     #Left side Abd pain 2/2 constipation/ Lactic acidosis   - nonspecific   - consulted by surgery, CT abd recommended  - check pancreatic enzymes, repeat LA level   - start Bentyl 10 mgh TID with meals   - c/w bowel regimen   - ensure daily BMs     #KRYSTINA on CKD III   - monitor BUN/cr and urine output   - start Flomax   - IV hydration while NPO   - avoid nephrotoxic medications   - kidney US noted     #HO  MDD, schizophrenia, dementia   - supportive care and current medications   - aspiration and fall precautions    # h/o HTN / HLD  - on Hydralazine Amlodipine   - c/w statin     DVT ppx: heparin subQ  Full Code    # Dispo: SDU  outpatient follow-up.  I have placed the appropriate orders for post-discharge needs.    Review of Systems  Review of Systems   Constitutional:  Negative for fever.   HENT:  Negative for hearing loss.    Eyes:  Negative for blurred vision.   Respiratory:  Positive for shortness of breath. Negative for cough.    Cardiovascular:  Positive for leg swelling. Negative for chest pain.   Gastrointestinal:  Negative for nausea and vomiting.   Genitourinary:  Negative for dysuria.   Musculoskeletal:  Negative for myalgias.   Skin:  Negative for rash.   Neurological:  Negative for dizziness.   Endo/Heme/Allergies:  Does not bruise/bleed easily.      Physical Exam  Temp:  [36.7 °C (98.1 °F)-37 °C (98.6 °F)] 36.8 °C (98.2 °F)  Pulse:  [75-82] 81  Resp:  [16-20] 18  BP: (142-158)/(82-91) 158/91  SpO2:  [94 %-100 %] 98 %    Physical Exam  Constitutional:       General: He is not in acute distress.     Appearance: He is obese.   HENT:      Head: Normocephalic and atraumatic.      Right Ear: Tympanic membrane normal.      Left Ear: Tympanic membrane normal.      Nose: Nose normal.      Mouth/Throat:      Mouth: Mucous membranes are moist.   Eyes:      Extraocular Movements: Extraocular movements intact.      Pupils: Pupils are equal, round, and reactive to light.   Cardiovascular:      Rate and Rhythm: Normal rate and regular rhythm.      Pulses: Normal pulses.   Pulmonary:      Effort: Pulmonary effort is normal. No respiratory distress.      Breath sounds: Wheezing present.   Abdominal:      General: Abdomen is flat. There is no distension.   Musculoskeletal:      Cervical back: Normal range of motion.      Right lower leg: Edema present.      Left lower leg: Edema present.      Comments: Right metatarsal  amputation    Skin:     General: Skin is warm.   Neurological:      General: No focal deficit present.      Mental Status: He is alert and oriented to person, place, and time. Mental status is at baseline.   Psychiatric:          Mood and Affect: Mood normal.       Fluids    Intake/Output Summary (Last 24 hours) at 11/20/2022 1030  Last data filed at 11/20/2022 0500  Gross per 24 hour   Intake 240 ml   Output 1550 ml   Net -1310 ml         Laboratory  Recent Labs     11/17/22  1254 11/18/22  0041 11/20/22  0109   WBC 9.2 7.5 6.0   RBC 3.77* 3.24* 4.03*   HEMOGLOBIN 11.3* 9.8* 12.1*   HEMATOCRIT 32.6* 28.1* 35.5*   MCV 86.5 86.7 88.1   MCH 30.0 30.2 30.0   MCHC 34.7 34.9 34.1   RDW 40.4 40.6 41.3   PLATELETCT 224 179 187   MPV 10.1 10.3 10.7       Recent Labs     11/18/22  0041 11/19/22  0056 11/20/22  0109   SODIUM 141 141 137   POTASSIUM 3.2* 3.5* 4.2   CHLORIDE 111 110 107   CO2 23 24 21   GLUCOSE 111* 173* 316*   BUN 26* 26* 27*   CREATININE 1.68* 1.85* 1.73*   CALCIUM 7.4* 7.6* 8.1*                     Imaging  EC-ECHOCARDIOGRAM COMPLETE W/O CONT   Final Result      CT-HEAD W/O   Final Result      Head CT without contrast within normal limits. No evidence of acute cerebral infarction, hemorrhage or mass lesion.         DX-CHEST-PORTABLE (1 VIEW)   Final Result      1.  Small right-sided pleural effusion and right lateral pleural thickening.      2.  Faint opacification throughout the right lower hemithorax consistent with diffuse pleural thickening, atelectasis, and/or pneumonitis.             Assessment/Plan  * Acute heart failure, unspecified heart failure type (HCC)- (present on admission)  Assessment & Plan  BNP:970, troponin:109  Repeat Echocardiogrm.  Last echo 4/2022 with preserved EF  Low albumin:2.2   Lasix and aldactone as BP and renal function allows.    Wheezing  Assessment & Plan  Need outpatient follow-up with pulmonology for PFT  Complete 5 days course of prednisone  On DuoNeb      Troponin I above reference range  Assessment & Plan  Elevated troponin likely in setting of demand ischemia and due to underlying AXEL, EKG unremarkable  Unlikely ACS  Denies chest pain  Continue to trend troponin  Telemetry monitoring, monitor  EKG   echocardiogram unremarkable         Serum albumin decreased  Assessment & Plan  11/17 Alb:2.2  Check UA for proteinuria. Rule out nephrotic syndrome.    Renal insufficiency  Assessment & Plan  He has followed up with Dr Ortiz in the past for nephrology  Patient states his ocular medication was causing some renal dysfunction along with his diabeties  Monitor bmp, vitals, I/O's    Nephrology consulted    Body mass index (BMI) 38.0-38.9, adult- (present on admission)  Assessment & Plan  Body mass index is 38.52 kg/m².  Component of water weight from volume overload.  Monitor for euvolemic weight    Presence of stent in coronary artery- (present on admission)  Assessment & Plan  As per CAD management    Type 2 diabetes mellitus with hyperglycemia, with long-term current use of insulin (HCC)- (present on admission)  Assessment & Plan  Per patient episode of hypoglycemia 11/17 am.  Hold trulicity, glargine and glimipiride  Monitor accuchecks and cover with SSI  11/11/22 A1c:5.5  Hypoglycemic protocol  Diabetic diet.  Repeat A1c      Coronary artery disease involving native coronary artery of native heart without angina pectoris- (present on admission)  Assessment & Plan  Medical management with aspirin 81mg daily, coreg 12.5mg BID, atorvastatin 80mg nightly.  Elevated troponin: 109 and will follow serial troponins x 3  EKG reviewed  Monitor on telemetry  Follows outpatient with Dr Andrew Engel    S/P transmetatarsal amputation of foot, right (HCC)- (present on admission)  Assessment & Plan  No acute wounds noted on bilateral feet       VTE prophylaxis: SCDs/TEDs and heparin ppx    I have performed a physical exam and reviewed and updated ROS and Plan today (11/20/2022). In review of yesterday's note (11/19/2022), there are no changes except as documented above.

## 2025-06-23 NOTE — PROGRESS NOTES
[facility] Progress Note     HD treatment ordered by Dr Plascencia x 3.5 hours.  Treatment Start time: 0915          End time: 1245       Net UF - 4000 ml    Patient tolerated treatment well. VS stable all through out.     All blood was returned. 15 g sharp HD needle removed from LUE AVF (buttonhole avoided). Dry gauze dressing applied and changed without bleeding issue. + Bruit/Thrill pre-post Treatment. See flow sheet for details.     Report given to WILFREDO Quezada RN.

## 2025-06-23 NOTE — DISCHARGE PLANNING
Case Management Discharge Planning    Admission Date: 6/18/2025  GMLOS: 4.9  ALOS: 5    6-Clicks ADL Score:    6-Clicks Mobility Score:        Anticipated Discharge Dispo: Discharge Disposition: Discharged to home/self care (01)    DME Needed: No    Action(s) Taken: JANICE updated by dialysis coordinator, Elizabeth Byron Mendez can accommodate pt on his old HD schedule of MWF at 0615 to accommodate 6 week course of IV abx as well. Pt okay to start on Wednesday. RN notified.    Addendum @0930  Spoke with pt at bedside to give second IMM. Form signed by pt and faxed to [facility].    Escalations Completed: None    Medically Clear: No    Next Steps: No CM needs identified at this time    Barriers to Discharge: Medical clearance    Is the patient up for discharge tomorrow: No

## 2025-06-24 ENCOUNTER — ANESTHESIA EVENT (OUTPATIENT)
Dept: CARDIOLOGY | Facility: MEDICAL CENTER | Age: 55
DRG: 871 | End: 2025-06-24
Payer: MEDICARE

## 2025-06-24 ENCOUNTER — ANESTHESIA (OUTPATIENT)
Dept: CARDIOLOGY | Facility: MEDICAL CENTER | Age: 55
DRG: 871 | End: 2025-06-24
Payer: MEDICARE

## 2025-06-24 ENCOUNTER — APPOINTMENT (OUTPATIENT)
Dept: CARDIOLOGY | Facility: MEDICAL CENTER | Age: 55
DRG: 871 | End: 2025-06-24
Payer: MEDICARE

## 2025-06-24 ENCOUNTER — PHARMACY VISIT (OUTPATIENT)
Dept: PHARMACY | Facility: MEDICAL CENTER | Age: 55
End: 2025-06-24
Payer: COMMERCIAL

## 2025-06-24 VITALS
BODY MASS INDEX: 27.98 KG/M2 | DIASTOLIC BLOOD PRESSURE: 81 MMHG | RESPIRATION RATE: 18 BRPM | SYSTOLIC BLOOD PRESSURE: 149 MMHG | WEIGHT: 206.57 LBS | TEMPERATURE: 97.2 F | HEART RATE: 73 BPM | HEIGHT: 72 IN | OXYGEN SATURATION: 96 %

## 2025-06-24 LAB
ALBUMIN SERPL BCP-MCNC: 3.4 G/DL (ref 3.2–4.9)
ANION GAP SERPL CALC-SCNC: 11 MMOL/L (ref 7–16)
BUN SERPL-MCNC: 23 MG/DL (ref 8–22)
CALCIUM ALBUM COR SERPL-MCNC: 8.8 MG/DL (ref 8.5–10.5)
CALCIUM SERPL-MCNC: 8.3 MG/DL (ref 8.5–10.5)
CHLORIDE SERPL-SCNC: 98 MMOL/L (ref 96–112)
CO2 SERPL-SCNC: 26 MMOL/L (ref 20–33)
CREAT SERPL-MCNC: 5.31 MG/DL (ref 0.5–1.4)
GFR SERPLBLD CREATININE-BSD FMLA CKD-EPI: 12 ML/MIN/1.73 M 2
GLUCOSE BLD STRIP.AUTO-MCNC: 143 MG/DL (ref 65–99)
GLUCOSE BLD STRIP.AUTO-MCNC: 147 MG/DL (ref 65–99)
GLUCOSE BLD STRIP.AUTO-MCNC: 151 MG/DL (ref 65–99)
GLUCOSE SERPL-MCNC: 148 MG/DL (ref 65–99)
PHOSPHATE SERPL-MCNC: 3.3 MG/DL (ref 2.5–4.5)
POTASSIUM SERPL-SCNC: 4.2 MMOL/L (ref 3.6–5.5)
SODIUM SERPL-SCNC: 135 MMOL/L (ref 135–145)

## 2025-06-24 PROCEDURE — 90935 HEMODIALYSIS ONE EVALUATION: CPT

## 2025-06-24 PROCEDURE — 82962 GLUCOSE BLOOD TEST: CPT | Performed by: STUDENT IN AN ORGANIZED HEALTH CARE EDUCATION/TRAINING PROGRAM

## 2025-06-24 PROCEDURE — 700101 HCHG RX REV CODE 250: Performed by: ANESTHESIOLOGY

## 2025-06-24 PROCEDURE — 93312 ECHO TRANSESOPHAGEAL: CPT | Mod: 26 | Performed by: INTERNAL MEDICINE

## 2025-06-24 PROCEDURE — 160195 HCHG PACU COMPLEX - 1ST 60 MINS

## 2025-06-24 PROCEDURE — B246ZZ4 ULTRASONOGRAPHY OF RIGHT AND LEFT HEART, TRANSESOPHAGEAL: ICD-10-PCS | Performed by: INTERNAL MEDICINE

## 2025-06-24 PROCEDURE — 700102 HCHG RX REV CODE 250 W/ 637 OVERRIDE(OP): Performed by: STUDENT IN AN ORGANIZED HEALTH CARE EDUCATION/TRAINING PROGRAM

## 2025-06-24 PROCEDURE — 700111 HCHG RX REV CODE 636 W/ 250 OVERRIDE (IP): Mod: TB | Performed by: ANESTHESIOLOGY

## 2025-06-24 PROCEDURE — 99239 HOSP IP/OBS DSCHRG MGMT >30: CPT | Performed by: HOSPITALIST

## 2025-06-24 PROCEDURE — 700111 HCHG RX REV CODE 636 W/ 250 OVERRIDE (IP): Mod: JZ | Performed by: INTERNAL MEDICINE

## 2025-06-24 PROCEDURE — A9270 NON-COVERED ITEM OR SERVICE: HCPCS | Performed by: STUDENT IN AN ORGANIZED HEALTH CARE EDUCATION/TRAINING PROGRAM

## 2025-06-24 PROCEDURE — 82962 GLUCOSE BLOOD TEST: CPT | Performed by: HOSPITALIST

## 2025-06-24 PROCEDURE — 4410588 EC-TEE W/O CONT

## 2025-06-24 PROCEDURE — 700111 HCHG RX REV CODE 636 W/ 250 OVERRIDE (IP): Performed by: STUDENT IN AN ORGANIZED HEALTH CARE EDUCATION/TRAINING PROGRAM

## 2025-06-24 PROCEDURE — 36415 COLL VENOUS BLD VENIPUNCTURE: CPT

## 2025-06-24 PROCEDURE — 93321 DOPPLER ECHO F-UP/LMTD STD: CPT | Mod: 26 | Performed by: INTERNAL MEDICINE

## 2025-06-24 PROCEDURE — 160002 HCHG RECOVERY MINUTES (STAT)

## 2025-06-24 PROCEDURE — 90935 HEMODIALYSIS ONE EVALUATION: CPT | Performed by: INTERNAL MEDICINE

## 2025-06-24 PROCEDURE — 80069 RENAL FUNCTION PANEL: CPT

## 2025-06-24 RX ORDER — ONDANSETRON 2 MG/ML
4 INJECTION INTRAMUSCULAR; INTRAVENOUS
Status: DISCONTINUED | OUTPATIENT
Start: 2025-06-24 | End: 2025-06-24 | Stop reason: HOSPADM

## 2025-06-24 RX ORDER — OXYCODONE HYDROCHLORIDE 5 MG/1
5 TABLET ORAL EVERY 8 HOURS PRN
Qty: 15 TABLET | Refills: 0 | Status: SHIPPED | OUTPATIENT
Start: 2025-06-24 | End: 2025-06-29

## 2025-06-24 RX ORDER — SODIUM CHLORIDE 9 MG/ML
100 INJECTION, SOLUTION INTRAVENOUS
Status: DISCONTINUED | OUTPATIENT
Start: 2025-06-24 | End: 2025-06-24 | Stop reason: HOSPADM

## 2025-06-24 RX ORDER — AMLODIPINE BESYLATE 10 MG/1
10 TABLET ORAL DAILY
Qty: 100 TABLET | Refills: 3 | Status: SHIPPED | OUTPATIENT
Start: 2025-06-25 | End: 2026-07-30

## 2025-06-24 RX ORDER — DEXMEDETOMIDINE HYDROCHLORIDE 100 UG/ML
INJECTION, SOLUTION INTRAVENOUS PRN
Status: DISCONTINUED | OUTPATIENT
Start: 2025-06-24 | End: 2025-06-24 | Stop reason: SURG

## 2025-06-24 RX ORDER — HALOPERIDOL 5 MG/ML
1 INJECTION INTRAMUSCULAR
Status: DISCONTINUED | OUTPATIENT
Start: 2025-06-24 | End: 2025-06-24 | Stop reason: HOSPADM

## 2025-06-24 RX ADMIN — PROPOFOL 50 MG: 10 INJECTION, EMULSION INTRAVENOUS at 14:12

## 2025-06-24 RX ADMIN — PROPOFOL 100 MG: 10 INJECTION, EMULSION INTRAVENOUS at 14:05

## 2025-06-24 RX ADMIN — LOSARTAN POTASSIUM 100 MG: 50 TABLET, FILM COATED ORAL at 05:10

## 2025-06-24 RX ADMIN — DEXMEDETOMIDINE 15 MCG: 100 INJECTION, SOLUTION INTRAVENOUS at 14:05

## 2025-06-24 RX ADMIN — OXYCODONE HYDROCHLORIDE 10 MG: 10 TABLET ORAL at 03:51

## 2025-06-24 RX ADMIN — HEPARIN SODIUM 5000 UNITS: 5000 INJECTION, SOLUTION INTRAVENOUS; SUBCUTANEOUS at 16:36

## 2025-06-24 RX ADMIN — CHOLESTYRAMINE 4 G: 4 POWDER, FOR SUSPENSION ORAL at 16:33

## 2025-06-24 RX ADMIN — GLYCOPYRROLATE 0.1 MG: 0.2 INJECTION INTRAMUSCULAR; INTRAVENOUS at 14:05

## 2025-06-24 RX ADMIN — METOLAZONE 5 MG: 5 TABLET ORAL at 05:11

## 2025-06-24 RX ADMIN — CHOLESTYRAMINE 4 G: 4 POWDER, FOR SUSPENSION ORAL at 10:12

## 2025-06-24 RX ADMIN — AMLODIPINE BESYLATE 10 MG: 10 TABLET ORAL at 05:11

## 2025-06-24 RX ADMIN — HEPARIN SODIUM 5000 UNITS: 5000 INJECTION, SOLUTION INTRAVENOUS; SUBCUTANEOUS at 05:11

## 2025-06-24 RX ADMIN — ASPIRIN 81 MG: 81 TABLET, COATED ORAL at 05:11

## 2025-06-24 RX ADMIN — OMEPRAZOLE 20 MG: 20 CAPSULE, DELAYED RELEASE ORAL at 05:11

## 2025-06-24 RX ADMIN — TORSEMIDE 100 MG: 100 TABLET ORAL at 05:11

## 2025-06-24 RX ADMIN — CEFAZOLIN SODIUM 1 G: 1 INJECTION, SOLUTION INTRAVENOUS at 16:32

## 2025-06-24 RX ADMIN — HYDROMORPHONE HYDROCHLORIDE 0.5 MG: 1 INJECTION, SOLUTION INTRAMUSCULAR; INTRAVENOUS; SUBCUTANEOUS at 05:11

## 2025-06-24 ASSESSMENT — PAIN DESCRIPTION - PAIN TYPE
TYPE: ACUTE PAIN
TYPE: ACUTE PAIN;CHRONIC PAIN

## 2025-06-24 ASSESSMENT — ENCOUNTER SYMPTOMS
FEVER: 1
PALPITATIONS: 0
SHORTNESS OF BREATH: 0
FALLS: 0
LOSS OF CONSCIOUSNESS: 0
INSOMNIA: 0
BLURRED VISION: 0
COUGH: 0
TINGLING: 1
CHILLS: 0
NAUSEA: 0

## 2025-06-24 ASSESSMENT — FIBROSIS 4 INDEX: FIB4 SCORE: 3.02

## 2025-06-24 ASSESSMENT — LIFESTYLE VARIABLES: SUBSTANCE_ABUSE: 0

## 2025-06-24 NOTE — CARE PLAN
The patient is Stable - Low risk of patient condition declining or worsening    Shift Goals  Clinical Goals: pain management, HD, MACKENZIE this afternoon  Patient Goals: pain management, minimal disruptions  Family Goals: not present    Progress made toward(s) clinical / shift goals:      Problem: Pain - Standard  Goal: Alleviation of pain or a reduction in pain to the patient’s comfort goal  Outcome: Progressing  Note: PRN pain meds for moderate to severe back pain per eMAR providing good relief of symptoms.       Patient is not progressing towards the following goals:

## 2025-06-24 NOTE — PROGRESS NOTES
NO HARD CHART:   Patient to procedure Room for MACKENZIE with no hard chart, only loose papers. No hard chart available at this time. Procedure RN completed Pre-Procedure Consent paperwork packet, including: WHO Yellow Sheet signed by RN and MD, Informed consent for MACKENZIE procedure signed by RN, patient, and Cardiology, and Informed consent for Anesthesia signed by Anesthesia MD and patient. Procedure Packet secured to loose papers and hand delivered to receiving PACU RN who acknowledged receipt and no hard chart.

## 2025-06-24 NOTE — CARE PLAN
The patient is Stable - Low risk of patient condition declining or worsening    Shift Goals  Clinical Goals: manage BP, MACKENZIE, NPO at 0000  Patient Goals: pain management, minimal disruptions  Family Goals: not present    Progress made toward(s) clinical / shift goals:    Problem: Knowledge Deficit - Standard  Goal: Patient and family/care givers will demonstrate understanding of plan of care, disease process/condition, diagnostic tests and medications  Outcome: Progressing     Problem: Hemodynamics  Goal: Patient's hemodynamics, fluid balance and neurologic status will be stable or improve  Outcome: Progressing     Problem: Fluid Volume  Goal: Fluid volume balance will be maintained  Outcome: Progressing     Problem: Respiratory  Goal: Patient will achieve/maintain optimum respiratory ventilation and gas exchange  Outcome: Progressing     Problem: Skin Integrity  Goal: Skin integrity is maintained or improved  Outcome: Progressing     Problem: Fall Risk  Goal: Patient will remain free from falls  Outcome: Progressing

## 2025-06-24 NOTE — ANESTHESIA TIME REPORT
Anesthesia Start and Stop Event Times       Date Time Event    6/24/2025 1401 Anesthesia Start     1430 Anesthesia Stop          Responsible Staff  06/24/25      Name Role Begin End    Ruben Johnson M.D. Anesth 1401 1430          Overtime Reason:  no overtime (within assigned shift)    Comments:

## 2025-06-24 NOTE — PROGRESS NOTES
Hospital Medicine Daily Progress Note    Date of Service  6/24/2025    Chief Complaint  Beni Moore is a 54 y.o. male admitted 6/18/2025 with fever.    Hospital Course    This is a 54-year-old male with a past medical history significant for ESRD on hemodialysis, diabetes mellitus type 2, left BKA, right TMA, prior alcohol and nicotine dependence, hypertension was presented from dialysis clinic after feeling ill along with fever on 6/18/2025.    He has been on week 3 of home dialysis he was noted to have fever, right leg cellulitis blood cultures positive for MSSA right leg wound culture also positive for MSSA on 6/18.  Repeat blood culture on 6/20 no growth to date, patient was started on Ancef    Because of his back pain MRI of LS spine showing T 11/12, L1/L2 abnormal signaling, possible infectious discitis.  Echo showing known MR and TR; however anterior mitral leaflet thickening in size cardiology consulted for MACKENZIE. Nephrology continue to follow, plan is to continue Ancef 2 to gram, 2 g, 3 g during dialysis.    Interval events:  -- Patient is alert, awake, answering question properly, vital sign has been reviewed, patient blood pressure is better controlled, currently saturating well on room air patient has been receiving dialysis, will try to take a liter of fluid.  Nephrology continue to follow the patient, plan is to receive IV antibiotics during dialysis  --TTE pending  --Labs reviewed patient said that his shortness of breath has gotten significantly better.  During the stay in the hospital, will continue 1 g of Ancef as per ESRD dosing  --Will follow ID and nephrology recommendation.      I have discussed this patient's plan of care and discharge plan at IDT rounds today with Case Management, Nursing, Nursing leadership, and other members of the IDT team.    Consultants/Specialty  infectious disease and nephrology    Code Status  Full Code    Disposition  The patient is not medically cleared for  discharge to home or a post-acute facility.  Anticipate discharge to: home with close outpatient follow-up    I have placed the appropriate orders for post-discharge needs.    Review of Systems  Review of Systems   Constitutional:  Positive for fever. Negative for chills.   Eyes:  Negative for blurred vision.   Respiratory:  Negative for cough and shortness of breath.    Cardiovascular:  Negative for palpitations.   Gastrointestinal:  Negative for nausea.   Genitourinary:  Negative for dysuria.   Musculoskeletal:  Negative for falls.   Skin:  Negative for rash.   Neurological:  Positive for tingling. Negative for loss of consciousness.   Psychiatric/Behavioral:  Negative for substance abuse. The patient does not have insomnia.    All other systems reviewed and are negative.       Physical Exam  Temp:  [36.4 °C (97.6 °F)-37.1 °C (98.7 °F)] 36.6 °C (97.9 °F)  Pulse:  [68-77] 71  Resp:  [16-18] 18  BP: (152-178)/(69-90) 154/83  SpO2:  [92 %-98 %] 98 %    Physical Exam  Vitals and nursing note reviewed.   Constitutional:       General: He is not in acute distress.     Appearance: He is not ill-appearing.   HENT:      Head: Normocephalic and atraumatic.   Eyes:      Extraocular Movements: Extraocular movements intact.      Pupils: Pupils are equal, round, and reactive to light.   Cardiovascular:      Rate and Rhythm: Normal rate and regular rhythm.   Pulmonary:      Effort: Pulmonary effort is normal. No respiratory distress.   Abdominal:      General: Bowel sounds are normal. There is no distension.      Palpations: Abdomen is soft.   Musculoskeletal:         General: Signs of injury present. No swelling or tenderness.      Cervical back: Neck supple.      Comments: Left BKA, R TMA; right anterior shin wound with surrounding erythema   Skin:     General: Skin is warm.   Neurological:      Mental Status: He is oriented to person, place, and time.      Cranial Nerves: No cranial nerve deficit.      Motor: No weakness.    Psychiatric:         Mood and Affect: Mood normal.         Behavior: Behavior normal.         Fluids    Intake/Output Summary (Last 24 hours) at 6/24/2025 1421  Last data filed at 6/24/2025 1120  Gross per 24 hour   Intake 800 ml   Output 3500 ml   Net -2700 ml        Laboratory  Recent Labs     06/23/25  0248   WBC 4.0*   RBC 3.35*   HEMOGLOBIN 9.6*   HEMATOCRIT 30.2*   MCV 90.1   MCH 28.7   MCHC 31.8*   RDW 44.9   PLATELETCT 105*   MPV 10.9     Recent Labs     06/22/25  0408 06/23/25  0248 06/24/25  0618   SODIUM 134* 134* 135   POTASSIUM 3.8 3.9 4.2   CHLORIDE 96 96 98   CO2 24 26 26   GLUCOSE 153* 205* 148*   BUN 41* 30* 23*   CREATININE 6.89* 5.82* 5.31*   CALCIUM 8.0* 7.9* 8.3*                     Imaging  EC-ECHOCARDIOGRAM COMPLETE W/O CONT   Final Result      MR-LUMBAR SPINE-WITH & W/O   Final Result      There is abnormal T2 hyperintensity and contrast enhancement of midportion of the T11-12 disc. There is focal adjacent endplate irregularities with contrast enhancement. There is also abnormal T2 hyperintensity of the midportion of L1-2 disc. There is    also endplate irregularity ,contrast enhancement and edema noted involving the adjacent superior endplate of L2. In view of history of MSSA bacteremia, these findings are suspicious for early T11-12 and L1-2 infection. However erosive spondyloarthropathy    in the setting of end-stage renal failure may produce similar finding. There is no periventricular fluid collection. There is no epidural fluid collection.      DX-CHEST-PORTABLE (1 VIEW)   Final Result      Persistent right pleural effusion and right basilar atelectasis/infiltrate with some new opacity in the left midlung field laterally.      EC-MACKENZIE W/O CONT    (Results Pending)        Assessment/Plan  * Cellulitis- (present on admission)  Assessment & Plan  Right leg appears red and warm however difficult to distinguish this from his baseline venous stasis, left leg feels warm as well and patient is  febrile so the warmth may be due to his general fever.   Continue current antibiotics, ancef  Follow cultures  ID following  Clinically improving    Hypertensive emergency  Assessment & Plan  Blood pressure up to 210/110  Asymptomatic  I have increased his amlodipine and continue losartan as well as ordered IV antihypertensives, continue to monitor and titrate medications appropriately  improved    Pneumonia due to infectious organism  Assessment & Plan  Infiltrates on chest x-ray and patient has started to develop cough, has fever and elevated procalcitonin  ID consulted  Will continue these antibiotics  Blood culture growing MSSA, repeat blood cultures pending    MSSA bacteremia- (present on admission)  Assessment & Plan  Blood culture with MSSA  Hx of strep bacteremia in April, completed antibiotics then  MRI L spine shows T11/T12 and L1/L2 discitis, per ID will presume infection, no need for IR biopsy  Echo shows thickened anterior mitral valve leaflet, cannot rule out endocarditis;   ID following  Continue ancef  Plan for likely 6 week IV antibiotic course at dialysis center  Cards following , pending MACKENZIE      ESRD (end stage renal disease) on dialysis (HCC)- (present on admission)  Assessment & Plan  Cannot tolerate dialysis session due to feeling ill and febrile and was referred to ED  He follows with Dr. Ortiz and I have consulted him, nephrology will follow for HD needs and other recommendations, appreciated    Sepsis ([facility])- (present on admission)  Assessment & Plan  This is Sepsis Present on admission  SIRS criteria identified on my evaluation include: Fever, with temperature greater than 100.9 deg F and Tachycardia, with heart rate greater than 90 BPM  Clinical indicators of end organ dysfunction include Lactic Acid greater than 2  Source is pneumonia and/or cellulitis  Sepsis protocol initiated  Crystalloid Fluid Administration: Resuscitation volume of per EDP ordered. Reason that resuscitation volume of  less than 30ml/kg was ordered concern for causing harm given ESRD  IV antibiotics as appropriate for source of sepsis  Reassessment: I have reassessed the patient's hemodynamic status    Type 2 diabetes mellitus with kidney complication, with long-term current use of insulin (HCC)- (present on admission)  Assessment & Plan  Stop lantus as not needing much insulin, continue ISS         VTE prophylaxis: heparin    Total time spent 54 minutes. I spent greater than 50% of the time for patient care, counseling, and coordination on this date, including unit/floor time, and face-to-face time with the patient as per interval events, my own review of patient's imaging and lab analysis and developing my assessment and plan above.

## 2025-06-24 NOTE — PROGRESS NOTES
Telemetry Shift Summary    Rhythm SR  HR Range 68-73  Ectopy rare pvcs  Measurements 0.16/0.05/0.55        Normal Values  Rhythm SR  HR Range    Measurements 0.12-0.20 / 0.06-0.10  / 0.30-0.52

## 2025-06-24 NOTE — DISCHARGE INSTRUCTIONS
Understanding Sepsis    Sepsis is a life-threatening problem that affects your organs. It can happen if you have a severe infection. It's most often caused by bacteria. It ranges in severity from sepsis to severe sepsis to septic shock. All of these are a medical emergency. They need to be treated right away.    What is Sepsis?    Sepsis is when the body reacts to an infection with severe inflammation. It can be caused by bacteria, fungus, or a virus. Sepsis can cause many kinds of problems in the body. It can lead to severe low blood pressure (shock). It can cause organ failure. This can lead to death if not treated.    Sepsis is most common in:    Adults 65 years and older  Patients in an intensive care unit (ICU)  People who have a central venous line or urinary catheter  People with a blood infection (bacteremia), pneumonia, meningitis, or a urinary tract infection  People with some cancers, diabetes, or long-term kidney or liver disease  People with immune system diseases, such as HIV or AIDS  People who had an organ transplant or bone marrow or stem cell transplant  People taking medicines that affect the immune system  People being treated with chemotherapy, steroid medicines, or radiation  People with severe injuries, including burns    Symptoms of Sepsis    Symptoms of sepsis can include:    Chills and shaking  High fever  Low blood pressure  Fast heartbeat  Fast breathing  Shortness of breath  Severe nausea or uncontrolled vomiting  Confusion  Not able to be awake or aware (coma)  Dizziness  Less urination  Severe pain, including in the back or joints     Diagnosing Sepsis    If your healthcare provider thinks you may have sepsis, you will be admitted to the hospital. You will have tests. You may have blood and urine tests. You may have cultures and other tests to look for the cause of the sepsis. These tests look for bacteria, viruses, and fungus. Other tests may check for problems with your organs. You  may have X-rays or other imaging tests. These may be done to look at your organs to find the source of infection.    Treating Sepsis    All forms of sepsis are a medical emergency. They must be treated in the hospital, often in the intensive care unit (ICU). If you have sepsis, your healthcare provider will give you antibiotics through a thin, flexible tube (IV). This is put into a vein in your arm or other area in your body. You will be given a large amount of fluids through the IV. You may be given nutrition or medicines through your IV.    Your healthcare provider will talk with you about other treatments you may need. These may include an oxygen mask or a ventilator to help you breathe. This may include medicine that raises your blood pressure. You might need dialysis for kidney failure. Treatment may last at least 7 to 10 days. Even with a lot of treatment, sepsis can lead to death.    © 2642-1889 [employer]. All rights reserved. This information is not intended as a substitute for professional medical care. Always follow your healthcare professional's instructions.      Understanding Post Sepsis Syndrome (PSS)    Sepsis is a serious illness caused by the body’s extreme immune response to an infection. Bacteria are the most common cause of an infection that leads to sepsis. The most common infections are pneumonia, abdominal infections, and urinary tract infections. Sepsis can lead to organ failure. When this happens, it needs to be treated in the intensive care unit (ICU) at a hospital. In the worst case, sepsis can be life-threatening. If a person survives sepsis, there can be long-term effects, both physical and psychological, such as fatigue, decreased mental functioning, sleep problems, chronic pain, and even posttraumatic stress disorder (PTSD). This is called post sepsis syndrome (PSS).    How Post Sepsis Syndrome Happens    Anyone can get sepsis, but infants, children, and the elderly are  at greater risk. Up to half of people who survive sepsis go on to get PSS.    The reason why some people get PSS and others don't isn't well understood. The risk of developing PSS is higher for those admitted to an ICU unit or those who have been in a hospital for extended periods of time. There is no specific lab test to diagnose PSS. But your provider may diagnose PSS based on your symptoms and condition.    Symptoms of Post Sepsis Syndrome    The symptoms of PSS may include:  Extreme tiredness  Chronic pain and weakness  Hair loss and skin rashes  Sleep problems such as insomnia and nightmares  Trouble concentrating  Trouble swallowing  Vision changes  Decreased mental functioning  Memory loss  Loss of self-esteem  Depression and anxiety  Posttraumatic stress disorder (PTSD)    Treatment for Post Sepsis Syndrome  After you have had sepsis, your rehabilitation usually starts in the hospital before you are sent home. Your healthcare team will help you move and take care of yourself, from standing and walking to getting to the bathroom. When recognized, your treatment may include physical therapy and rehabilitation along with counseling and mental health support. The purpose of rehabilitation is to restore your previous level of health or get you back as close to it as possible.    When to call your healthcare provider after your discharge    If you’ve had sepsis, talk with your healthcare provider if you’re having symptoms that may suggest PSS, like ongoing fatigue and weakness, memory loss, or trouble concentrating.    Be aware of the symptoms of sepsis. Call your healthcare provider right away if you have any of these:  Fever of 100.4°F (38°C) or higher, or as directed  Pain that gets worse  Symptoms that don’t get better, or get worse  New symptoms  Recurrent infections    © 4379-4847 [employer]. All rights reserved. This information is not intended as a substitute for professional medical care.  Always follow your healthcare professional's instructions.

## 2025-06-24 NOTE — OR NURSING
1436 - patient arrived to pacu.  2 identifiers verified, attached to monitors, alarms/parameters verified, and received report.      1517 patient given water, no difficulty swallowing    1525 report given to receiving RN.     1530 patient transported to room with yuki ORTIZ

## 2025-06-24 NOTE — HOSPITAL COURSE
This is a 54-year-old male with a past medical history significant for ESRD on hemodialysis, diabetes mellitus type 2, left BKA, right TMA, prior alcohol and nicotine dependence, hypertension was presented from dialysis clinic after feeling ill along with fever on 6/18/2025.    He has been on week 3 of home dialysis,he was noted to have fever, right leg cellulitis blood cultures positive for MSSA right leg wound culture also positive for MSSA on 6/18.  Repeat blood culture on 6/20 no growth to date, patient was started on Ancef    Because of his back pain MRI of LS spine showing T 11/12, L1/L2 abnormal signaling, possible infectious discitis.  Echo showing known MR and TR; however anterior mitral leaflet thickening in size .  MACKENZIE did not show any vegetation, ID continue to follow, patient will continue IV Ancef for 6 weeks, last day being 8/1/2025.  Patient follow-up ID clinic, he would likely need orals for 2 to 3 months after completing IV antibiotics.     For all the other chronic medical condition, he will resume his home medication.

## 2025-06-24 NOTE — ANESTHESIA POSTPROCEDURE EVALUATION
Patient: Beni Moore    Procedure Summary       Date: 06/24/25 Room / Location: [facility] - Echocardiology [facility]    Anesthesia Start: 1401 Anesthesia Stop: 1430    Procedure: EC-MACKENZIE W/O CONT Diagnosis:       Cellulitis      Sepsis (HCC)      Cellulitis      Sepsis (HCC)      (Bacteremia)    Scheduled Providers: Costa OCHOA M.D.; Ruben Johnson M.D. Responsible Provider: Ruben Johnson M.D.    Anesthesia Type: general ASA Status: 3            Final Anesthesia Type: general  Last vitals  BP   Blood Pressure: (!) 147/73    Temp   36.5 °C (97.7 °F)    Pulse   72   Resp   17    SpO2   100 %      Anesthesia Post Evaluation    Patient location during evaluation: PACU  Patient participation: waiting for patient participation  Level of consciousness: obtunded/minimal responses    Airway patency: patent  Anesthetic complications: no  Cardiovascular status: hemodynamically stable  Respiratory status: acceptable  Hydration status: euvolemic    PONV: none          No notable events documented.     Nurse Pain Score: 0 (NPRS)

## 2025-06-24 NOTE — PROCEDURES
Nephrology/PUF note    Patient with ESRD/HHD admitted with sepsis  Seen and examined during dialysis treatment  Tolerates well  VS stable  UF 3-4 L  Please see dialysis flow sheet for details

## 2025-06-24 NOTE — PROGRESS NOTES
Cardiology update:    Elective MACKENZIE performed given MSSA bacteremia to rule out endocarditis.    Findings:  No evidence of vegetation/endocarditis.  Noted to have moderate mitral regurgitation and moderate tricuspid regurgitation--previously seen on surface echo.    Final MACKENZIE report to follow.    Electronically signed: Costa Santana MD  Cardiologist, [facility]    Please note that this dictation was created using voice recognition software. There are errors of grammar and possibly content I did not discover before finalizing the note.     6/24/2025  2:30 PM

## 2025-06-24 NOTE — PROGRESS NOTES
Patient with MACKENZIE today with no vegetations noted.  Repeat blood cultures on 6/20 are no growth to date.  Okay to discharge from ID perspective with plan to continue antibiotics via dialysis.  Patient will be doing 3 times a week dialysis at the dialysis center for now.    Assessment:  54 y.o. male with a history of ESRD on hemodialysis, type 2 diabetes mellitus, history of group A streptococcus bacteremia in March secondary to cellulitis admitted on 6/18/2025 secondary to fever and malaise.  Patient now found to have MSSA bacteremia.  Source likely cellulitis and wound infection.       Pertinent diagnoses:  MSSA bacteremia  Acute hypoxic respiratory failure  Right lower extremity cellulitis, wound cultures on 6/18 positive for MSSA  Pneumonia  Thoracic spine discitis  -MRI with contrast on 6/22 notes enhancement at T11-12 disc as well as L1-2.  Findings suspicious for early T11-L2 infection.  No periventricular fluid collection, no epidural fluid collection.  Thrombocytopenia  ESRD on hemodialysis  -Per nephrology patient struggled with high intradialytic weight gains previously and so had transition to home dialysis 5 days a week and cannulate his own fistula.  Type 2 diabetes mellitus  Right TMA  Left BKA  History of group A streptococcus bacteremia in March 2025  History of left shoulder hardware in place, chronic pain in the shoulder which he states is unchanged      Plan:  -Continue IV cefazolin 1 g daily while inpatient, on DC transition to cefazolin with dialysis 2/2/3 g --I considered rifampin given the hardware in the left shoulder.  However, patient with several drug drug interactions so will not initiate  --Recommend a 6-week IV antibiotic course due to infection in the spine as well as bacteremia, end 8/1/25   --- After the 6-week course we will either stop antibiotics or potentially transition to orals for another 2 to 3 months.  Will not repeat MRI spine unless new symptoms such as worsening back  pain.  --Continue to monitor for any new head, neck back or joint pain particular the left shoulder and if concerning may need dedicated imaging  -Blood cultures on 6/18+ MSSA  -Follow repeat blood cultures on 6/20-negative to date  -Right lower extremity wound culture+ MSSA  -Follow MACKENZIE -Per short note and verbal no vegetations noted.  Full report pending.      This infection pose a threat to life     Disposition: Okay for discharge from ID perspective once antibiotics have been set up, will need to be ordered by nephrology to be completed with dialysis.       Follow-up in the ID clinic with NP in ~ 2 weeks and then prior to stopping antibiotics per     ID will sign off

## 2025-06-24 NOTE — PROGRESS NOTES
Telemetry Shift Summary    Rhythm SR  HR Range 56  Ectopy 72  Measurements 0.17/0.09/0.57        Normal Values  Rhythm SR  HR Range    Measurements 0.12-0.20 / 0.06-0.10  / 0.30-0.52                          7 beats of vtach at 1047, no chest pain, no palpitations, BP stable. Dr. Patiño notified.

## 2025-06-24 NOTE — ANESTHESIA PREPROCEDURE EVALUATION
Date/Time: 06/24/25 1400    Scheduled providers: Costa OCHOA M.D.; Ruben Johnson M.D.    Procedure: EC-MACKENZIE W/O CONT    Diagnosis:       Cellulitis [L03.90]      Sepsis ([facility]) [A41.9]      Cellulitis [L03.90]      Sepsis (HCC) [A41.9]    Indications: Bacteremia    Location: [facility] - Echocardiology [facility]            Relevant Problems   PULMONARY   (positive) Pneumonia due to infectious organism   (positive) Shortness of breath      CARDIAC   (positive) Coronary artery disease due to lipid rich plaque - post PCI to RCA in 2019   (positive) Hypertensive emergency   (positive) Moderate mitral regurgitation   (positive) NSTEMI (non-ST elevated myocardial infarction) ([facility])   (positive) Paroxysmal SVT (supraventricular tachycardia) ([facility]) -on telemetry and during MACKENZIE atrial tachycardia likely   (positive) Presence of stent in coronary artery   (positive) Primary hypertension         (positive) Acute renal failure superimposed on stage 3b chronic kidney disease (HCC)   (positive) CKD (chronic kidney disease) stage 5, GFR less than 15 ml/min (HCC)   (positive) ESRD (end stage renal disease) on dialysis (HCC)   (positive) Hypertensive heart and chronic kidney disease with heart failure and stage 1 through stage 4 chronic kidney disease, or unspecified chronic kidney disease (HCC)   (positive) Type 2 diabetes mellitus with kidney complication, with long-term current use of insulin (HCC)      ENDO   (positive) Type 2 diabetes mellitus (HCC)   (positive) Type 2 diabetes mellitus with kidney complication, with long-term current use of insulin (HCC)      Other   (positive) Osteomyelitis and soft tissue infection of left foot (HCC)       Physical Exam    Airway   Mallampati: II  TM distance: >3 FB  Neck ROM: full       Cardiovascular - normal exam  Rhythm: irregular  Rate: normal    (+) murmur     Dental - normal exam           Pulmonary - normal examBreath sounds clear to auscultation     Abdominal     Neurological - normal exam                   Anesthesia Plan    ASA 3   ASA physical status 3 criteria: ESRD undergoing regularly scheduled dialysis    Plan - general       Airway plan will be natural airway          Induction: intravenous      Pertinent diagnostic labs and testing reviewed    Informed Consent:    Anesthetic plan and risks discussed with patient.    Use of blood products discussed with: patient whom consented to blood products.

## 2025-06-24 NOTE — DISCHARGE SUMMARY
Discharge Summary    CHIEF COMPLAINT ON ADMISSION  Chief Complaint   Patient presents with    Fever     BIB [facility] from dialysis after patient had fatigue and shivering during session today. He got most of his session. He has wound to right lower extremity, PMH DM with left aka. He was given 650mg tylenol for fever PTA. They placed him on 3L NC for comfort, but sating well on RA on arrival       Reason for Admission  Cellulitis     Admission Date  6/18/2025    CODE STATUS  Full Code    HPI & HOSPITAL COURSE  This is a 54-year-old male with a past medical history significant for ESRD on hemodialysis, diabetes mellitus type 2, left BKA, right TMA, prior alcohol and nicotine dependence, hypertension was presented from dialysis clinic after feeling ill along with fever on 6/18/2025.    He has been on week 3 of home dialysis,he was noted to have fever, right leg cellulitis blood cultures positive for MSSA right leg wound culture also positive for MSSA on 6/18.  Repeat blood culture on 6/20 no growth to date, patient was started on Ancef    Because of his back pain MRI of LS spine showing T 11/12, L1/L2 abnormal signaling, possible infectious discitis.  Echo showing known MR and TR; however anterior mitral leaflet thickening in size .  MACKEZNIE did not show any vegetation, ID continue to follow, patient will continue IV Ancef for 6 weeks, last day being 8/1/2025.  Patient follow-up ID clinic, he would likely need orals for 2 to 3 months after completing IV antibiotics.     For all the other chronic medical condition, he will resume his home medication.    Therefore, he is discharged in good and stable condition to home with close outpatient follow-up.    The patient met 2-midnight criteria for an inpatient stay at the time of discharge.    Discharge Date  6/24/2025      FOLLOW UP ITEMS POST DISCHARGE  Florinda Pascual M.D.  Please [facility]    DISCHARGE DIAGNOSES  Principal Problem:    Cellulitis (POA:  Yes)  Active Problems:    Type 2 diabetes mellitus with kidney complication, with long-term current use of insulin (HCC) (POA: Yes)    Sepsis (HCC) (POA: Yes)    ESRD (end stage renal disease) on dialysis (HCC) (POA: Yes)    MSSA bacteremia (POA: Yes)    Pneumonia due to infectious organism (POA: Unknown)    Hypertensive emergency (POA: Unknown)  Resolved Problems:    * No resolved hospital problems. *      FOLLOW UP  Future Appointments   Date Time Provider Department Center   8/5/2025  9:20 AM Florinda Pascual M.D. [facility]     No follow-up provider specified.    MEDICATIONS ON DISCHARGE     Medication List        START taking these medications        Instructions   oxyCODONE immediate-release 5 MG Tabs  Commonly known as: Roxicodone   Take 1 Tablet by mouth every 8 hours as needed for Severe Pain for up to 5 days.  Dose: 5 mg            CHANGE how you take these medications        Instructions   amLODIPine 10 MG Tabs  Start taking on: June 25, 2025  What changed:   medication strength  how much to take  Commonly known as: Norvasc   Take 1 Tablet by mouth every day.  Dose: 10 mg     atorvastatin 40 MG Tabs  What changed: Another medication with the same name was removed. Continue taking this medication, and follow the directions you see here.  Commonly known as: Lipitor   Take 1 Tablet by mouth every evening.  Dose: 40 mg            CONTINUE taking these medications        Instructions   aspirin 81 MG EC tablet   Take 1 Tablet by mouth every day.  Dose: 81 mg     cholestyramine 4 g packet  Commonly known as: Questran   Take 4 g by mouth 2 times a day for 360 days.  Dose: 1 Packet     Insulin Degludec 100 UNIT/ML Soln   Inject 20 Units under the skin every evening.  Dose: 20 Units     insulin regular 100 UNIT/ML Soln  Commonly known as: HumuLIN R/NovoLIN R   Inject 6-12 Units under the skin with meals as needed for High Blood Sugar.  Dose: 6-12 Units     losartan 100 MG Tabs  Commonly known as: Cozaar   Take 1  Tablet by mouth every day.  Dose: 100 mg     metOLazone 5 MG Tabs  Commonly known as: Zaroxolyn   Take 1 Tablet by mouth every day.  Dose: 5 mg     Mircera 150 MCG/0.3ML Sosy  Generic drug: Methoxy PEG-Epoetin Beta   Inject 150 mcg as directed every 4 weeks. Alfredito ([phone])  Dose: 150 mcg     NON SPECIFIED   Administer 1 Each into both eyes see administration instructions. Eye injections every 4 to 6 weeks from [facility]  Dose: 1 Each     omeprazole 20 MG delayed-release capsule  Commonly known as: PriLOSEC   Take 20 mg by mouth every morning.   Dose: 20 mg     torsemide 100 MG Tabs  Commonly known as: Demadex   Take 1 Tablet by mouth every day.  Dose: 100 mg            STOP taking these medications      Advil 200 MG Caps  Generic drug: Ibuprofen     azithromycin 250 MG Tabs  Commonly known as: Zithromax              Allergies  Allergies[1]    DIET  Orders Placed This Encounter   Procedures    Diet NPO Restrict to: Sips with Medications     Standing Status:   Standing     Number of Occurrences:   8     Diet NPO Restrict to::   Sips with Medications [3]       ACTIVITY  As tolerated.  Weight bearing as tolerated    CONSULTATIONS  Nephrology  id    PROCEDURES  none    LABORATORY  Lab Results   Component Value Date    SODIUM 135 06/24/2025    POTASSIUM 4.2 06/24/2025    CHLORIDE 98 06/24/2025    CO2 26 06/24/2025    GLUCOSE 148 (H) 06/24/2025    BUN 23 (H) 06/24/2025    CREATININE 5.31 (HH) 06/24/2025        Lab Results   Component Value Date    WBC 4.0 (L) 06/23/2025    HEMOGLOBIN 9.6 (L) 06/23/2025    HEMATOCRIT 30.2 (L) 06/23/2025    PLATELETCT 105 (L) 06/23/2025        Total time of the discharge process exceeds 39 minutes.       [1] No Known Allergies

## 2025-06-24 NOTE — PROGRESS NOTES
[facility] SERVICES PROGRESS NOTE    Hemodialysis Treatment ordered today per DR. Plascencia X .2.5 Hours.  Treatment started at 0842 Completed at 1114    Pt tolerated tx well. No issues with tx or access. Pt alert and vss after tx ended.   See electronic flow sheet for details.    Net UF 3000 ml.    Needles removed from LUE AVF  access site.Dressings applied and sites held x 10 minutes, verified no bleeding. Positive thrill and bruit post tx. Staff RN to monitor AVF for breakthrough bleeding. Should breakthrough bleeding occur, staff RN to apply pressure to access sites until bleeding stops.     Notify Dialysis and Nephrologist for follow up.   Report given to primary RN.

## 2025-06-25 ENCOUNTER — PATIENT OUTREACH (OUTPATIENT)
Dept: MEDICAL GROUP | Facility: PHYSICIAN GROUP | Age: 55
End: 2025-06-25
Payer: MEDICARE

## 2025-06-25 NOTE — PROGRESS NOTES
Pt discharged to home via son's vehicle and pt's personal WC. Phone, wallet, keys, LLE prosthetic, and Rx sent home w/ pt. IV discontinued. AVS reviewed w/ patient, all questions answered. Pt a/ox4, VSS upon discharge.

## 2025-06-26 NOTE — PROGRESS NOTES
Transitional Care Management  TCM Outreach Date and Time: Filed (6/25/2025  9:21 AM)    Discharge Questions  Actual Discharge Date: 06/24/25  Now that you are home, how are you feeling?: Good  Did you receive any new prescriptions?: Yes  Were you able to get them filled?: Yes  Meds to Bed or Pharmacy filled?: Meds to Bed  Do you have any questions about your current medications or new medications (Review Med Rec)?: No  Did you have any durable medical equipment ordered?: No  Do you have a follow up appointment scheduled with your PCP?: Yes  Appointment Date: 06/27/25  Appointment Time: 1420  Any issues or paperwork you wish to discuss with your PCP?: No  Are you (patient) able to get to the appointment?: Yes  If Home Health was ordered, have they contacted you (Patient): Not Applicable  Did you have enough support after your last discharge?: Yes  Does this patient qualify for the CCM program?: No (medicare)    Transitional Care  Number of attempts made to contact patient: 1  Current or previous attempts completed within two business days of discharge? : Yes  Provided education regarding treatment plan, medications, self-management, ADLs?: No  Has patient completed an Advanced Directive?: No  Has the Care Manager's phone number provided?: No  Is there anything else I can help you with?: No    Discharge Summary  Chief Complaint: Fever        BIB [facility] from dialysis after patient had fatigue and shivering during session today. He got most of his session. He has wound to right lower extremity, PMH DM with left aka. He was given 650mg tylenol for fever PTA. They placed him on 3L NC for comfort, but sating well on RA on arrival  Admitting Diagnosis: Cellulitis  Discharge Diagnosis: Cellulitis

## 2025-06-27 ENCOUNTER — OFFICE VISIT (OUTPATIENT)
Dept: MEDICAL GROUP | Facility: PHYSICIAN GROUP | Age: 55
End: 2025-06-27
Payer: MEDICARE

## 2025-06-27 VITALS
WEIGHT: 198 LBS | RESPIRATION RATE: 16 BRPM | BODY MASS INDEX: 26.82 KG/M2 | OXYGEN SATURATION: 97 % | DIASTOLIC BLOOD PRESSURE: 64 MMHG | TEMPERATURE: 98.9 F | SYSTOLIC BLOOD PRESSURE: 116 MMHG | HEIGHT: 72 IN | HEART RATE: 85 BPM

## 2025-06-27 DIAGNOSIS — M25.9 SHOULDER PROBLEM: ICD-10-CM

## 2025-06-27 DIAGNOSIS — E11.3599 TYPE 2 DIABETES MELLITUS WITH PROLIFERATIVE RETINOPATHY WITHOUT MACULAR EDEMA, WITHOUT LONG-TERM CURRENT USE OF INSULIN, UNSPECIFIED LATERALITY (HCC): Primary | ICD-10-CM

## 2025-06-27 DIAGNOSIS — S88.112D BELOW-KNEE AMPUTATION OF LEFT LOWER EXTREMITY, SUBSEQUENT ENCOUNTER (HCC): ICD-10-CM

## 2025-06-27 DIAGNOSIS — Z09 HOSPITAL DISCHARGE FOLLOW-UP: ICD-10-CM

## 2025-06-27 DIAGNOSIS — L03.115 CELLULITIS OF RIGHT LOWER EXTREMITY: ICD-10-CM

## 2025-06-27 PROBLEM — L03.119 CELLULITIS OF EXTREMITY: Status: ACTIVE | Noted: 2025-06-18

## 2025-06-27 PROBLEM — S88.112A BELOW-KNEE AMPUTATION OF LEFT LOWER EXTREMITY (HCC): Status: ACTIVE | Noted: 2025-06-27

## 2025-06-27 ASSESSMENT — FIBROSIS 4 INDEX: FIB4 SCORE: 3.02

## 2025-06-27 NOTE — PROGRESS NOTES
Subjective:     Beni Moore is a 54 y.o. male who presents for Hospital Follow-up.    Transitional Care Management  TCM Outreach Date and Time: Filed (6/25/2025  9:21 AM)    Discharge Questions  Actual Discharge Date: 06/24/25  Now that you are home, how are you feeling?: Good  Did you receive any new prescriptions?: Yes  Were you able to get them filled?: Yes  Meds to Bed or Pharmacy filled?: Meds to Bed  Do you have any questions about your current medications or new medications (Review Med Rec)?: No  Did you have any durable medical equipment ordered?: No  Do you have a follow up appointment scheduled with your PCP?: Yes  Appointment Date: 06/27/25  Appointment Time: 1420  Any issues or paperwork you wish to discuss with your PCP?: No  Are you (patient) able to get to the appointment?: Yes  If Home Health was ordered, have they contacted you (Patient): Not Applicable  Did you have enough support after your last discharge?: Yes  Does this patient qualify for the CCM program?: No (medicare)    Transitional Care  Number of attempts made to contact patient: 1  Current or previous attempts completed within two business days of discharge? : Yes  Provided education regarding treatment plan, medications, self-management, ADLs?: No  Has patient completed an Advanced Directive?: No  Has the Care Manager's phone number provided?: No  Is there anything else I can help you with?: No    Discharge Summary  Chief Complaint: Fever        BIB [facility] from dialysis after patient had fatigue and shivering during session today. He got most of his session. He has wound to right lower extremity, PMH DM with left aka. He was given 650mg tylenol for fever PTA. They placed him on 3L NC for comfort, but sating well on RA on arrival  Admitting Diagnosis: Cellulitis  Discharge Diagnosis: Cellulitis        HPI:   Recently hospitalized for a lightest to his right ankle.  Patient will be seeing infectious disease for follow-up.  Patient is a  diabetic he is seeing endocrinology for this.  Patient also is getting dialysis the plan is for patient to eventually do home dialysis.  Patient lives in [place].  Patient has called a medical supply called [employer] who recommends that I write a note for a electric wheelchair and they will have physical therapy assess him.  Reason why patient needs electric wheelchair he has been told that due to fact he has diabetes he is not can be able to be fitted for a prosthetic below the knee left leg.  Patient also has had significant surgery done to his left shoulder and he is having problems with his arms and will not be able to manually wheel himself with a regular wheelchair.  Patient does have a ramp to his house also he states that electric wheelchair could easily moved throughout his house with no problem.    Current medicines (including reconciliation performed today)  Current Outpatient Medications   Medication Sig Dispense Refill    amLODIPine (NORVASC) 10 MG Tab Take 1 Tablet by mouth every day. 100 Tablet 3    oxyCODONE immediate-release (ROXICODONE) 5 MG Tab Take 1 Tablet by mouth every 8 hours as needed for Severe Pain for up to 5 days. 15 Tablet 0    insulin regular (HUMULIN R/NOVOLIN R) 100 UNIT/ML Solution Inject 6-12 Units under the skin with meals as needed for High Blood Sugar.      Insulin Degludec 100 UNIT/ML Solution Inject 20 Units under the skin every evening.      Methoxy PEG-Epoetin Beta (MIRCERA) 150 MCG/0.3ML Solution Prefilled Syringe Inject 150 mcg as directed every 4 weeks. [facility] ([phone])      NON SPECIFIED Administer 1 Each into both eyes see administration instructions. Eye injections every 4 to 6 weeks from [facility]      atorvastatin (LIPITOR) 40 MG Tab Take 1 Tablet by mouth every evening. 90 Tablet 3    cholestyramine (QUESTRAN) 4 g packet Take 4 g by mouth 2 times a day for 360 days. 60 Each 11    torsemide (DEMADEX) 100 MG Tab Take 1 Tablet by mouth every day.  90 Tablet 3    losartan (COZAAR) 100 MG Tab Take 1 Tablet by mouth every day. 90 Tablet 3    metOLazone (ZAROXOLYN) 5 MG Tab Take 1 Tablet by mouth every day. 90 Tablet 3    aspirin 81 MG EC tablet Take 1 Tablet by mouth every day. 30 Tablet 2    omeprazole (PRILOSEC) 20 MG delayed-release capsule Take 20 mg by mouth every morning.          No current facility-administered medications for this visit.       Allergies:   Patient has no known allergies.    Social History     Tobacco Use    Smoking status: Former     Current packs/day: 0.00     Average packs/day: 0.6 packs/day for 17.0 years (10.0 ttl pk-yrs)     Types: Cigarettes     Start date: 1988     Quit date:      Years since quittin.5     Passive exposure: Never    Smokeless tobacco: Never   Vaping Use    Vaping status: Never Used   Substance Use Topics    Alcohol use: Not Currently     Comment: Quit     Drug use: Not Currently     Types: Oral     Comment: NOT CURRENTLY, HISTORY OF CBD       ROS:  Patient states that his blood sugars have been running around 110-115 his last hemoglobin A1c was 6.5    Objective:     Vitals:    25 1356   BP: 116/64   BP Location: Right arm   Patient Position: Sitting   BP Cuff Size: Adult   Pulse: 85   Resp: 16   Temp: 37.2 °C (98.9 °F)   SpO2: 97%   Weight: 89.8 kg (198 lb)   Height: 1.829 m (6')     Body mass index is 26.85 kg/m².    Physical Exam:  General appearance male in no acute distress he is in a wheelchair  Lungs clear  Regular rate  Skin: Right ankle there is no swelling noted or redness or drainage at this time.  Patient is alert and oriented x 3  Assessment and Plan:   1. Hospital discharge follow-up  Patient will be following up with infectious disease does have appointment.  Patient also is getting his dialysis done 3 times a week.  Patient is seeing a nephrologist and also seeing the endocrinologist.    2. Cellulitis of right lower extremity  And cellulitis seems to be looking a lot better  he is to follow-up with infectious disease    3. Shoulder problem  Patient does have a scar to his left shoulder and states that he has had chronic pain and issues with that shoulder.  Patient unable to use crutches because of this.    4. Type 2 diabetes mellitus with proliferative retinopathy without macular edema, without long-term current use of insulin, unspecified laterality ([facility])    5. Below-knee amputation of left lower extremity, subsequent encounter ([facility])  I will go ahead and fax this note so he can be assessed to see if he qualifies for electric wheelchair.  Patient does have chronic issues with his shoulder at this time.    - Chart and discharge summary were reviewed.   - Hospitalization and results reviewed with patient.   - Medications reviewed including instructions regarding high risk medications, dosing and side effects.  - Recommended Services: No services needed at this time  - Advance directive/POLST on file?  No     Follow-up:Return in about 6 weeks (around 8/8/2025), or if symptoms worsen or fail to improve.    Face-to-face transitional care management services with HIGH (today's visit is within days post discharge & LACE+ score 59+) medical decision complexity were provided.

## 2025-06-27 NOTE — PROGRESS NOTES
Subjective:     CC:   Chief Complaint   Patient presents with   • Transitional Care Management Hospital Follow-up       HPI:   Beni presents today with ***    Past Medical History[1]    Social History[2]    Current Medications and Prescriptions Ordered in Epic[3]    Allergies:  Patient has no known allergies.    Health Maintenance: {COMPLETED:522550}    ROS:  Gen: no fevers/chills, no changes in weight  Eyes: no changes in vision  ENT: no sore throat, no hearing loss, no bloody nose  Pulm: no sob, no cough  CV: no chest pain, no palpitations  GI: no nausea/vomiting, no diarrhea  : no dysuria  MSk: no myalgias  Skin: no rash  Neuro: no headaches, no numbness/tingling  Heme/Lymph: no easy bruising    Objective:     Exam:  /64 (BP Location: Right arm, Patient Position: Sitting, BP Cuff Size: Adult)   Pulse 85   Temp 37.2 °C (98.9 °F)   Resp 16   Ht 1.829 m (6')   Wt 89.8 kg (198 lb) Comment: Pt states  SpO2 97%   BMI 26.85 kg/m²  Body mass index is 26.85 kg/m².    Gen: Alert and oriented, No apparent distress.  Skin: Warm and dry.  No obvious lesions.  Eyes: Sclera wnl Pupils normal in size  ENT: Canals wnl and TM are not red  Neck: Neck is supple without lymphadenopathy.  Lungs: Normal effort, CTA bilaterally, no wheezes, rhonchi, or rales  CV: Regular rate and rhythm. No murmurs, rubs, or gallops.  ABD: Soft non-tender no organomegaly  Musculoskeletal: Normal gait. No extremity cyanosis, clubbing, or edema.  Neuro: Oriented to person, place and time  Psych: Mood is wnl     A chaperone was offered to the patient during today's exam. Chaperone name: Tika Vega MA was present.    Labs: ***    Assessment & Plan:     54 y.o. male with the following -     1. Cellulitis of right lower extremity    2. Shoulder problem    3. Type 2 diabetes mellitus with proliferative retinopathy without macular edema, without long-term current use of insulin, unspecified laterality (HCC)    4. Hospital discharge follow-up        No follow-ups on file.    Please note that this dictation was created using voice recognition software. I have made every reasonable attempt to correct obvious errors, but I expect that there are errors of grammar and possibly content that I did not discover before finalizing the note.         [1]  Past Medical History:  Diagnosis Date   • Anesthesia     Hypotension post-op, persisted for a few months x 1   • Anesthesia 2023    Patient stated after 2016 colonoscopy he became hypotensive.   • Bowel habit changes     History GI problems   • Cataract     bilateral IOL   • Coronary artery disease due to lipid rich plaque - post PCI to RCA in 2019 10/02/2019   • Deaf, right    • Diabetes ([facility])     Oral medications & insulin-24 only insulin   • Dialysis patient ([facility]) 2024    once a week on wednesday at [facility]   • Endocarditis of aortic valve 2025   • Heart burn     GERD; takes Prilosec   • Hemorrhagic disorder ([facility])     ASA protection for stent and cardiac history   • High cholesterol     medicated x 2   • History of non-ST elevation myocardial infarction (NSTEMI)     Per Problem List   • Hx of heart artery stent 2019   • Hyperlipidemia    • Hyperparathyroidism due to renal insufficiency ([facility])     Per Problem List   • Hypertension     medicated   • Moderate mitral regurgitation 2025   • Myocardial infarct ([facility]) 2019    FOLLOWED BY DR MORGAN   • Paroxysmal SVT (supraventricular tachycardia) ([facility]) -on telemetry and during MACKENZIE atrial tachycardia likely 2025   • Pneumonia     H/O   • Renal disorder 2023    Stage 3 CKD; 2024 dialysis at [facility] on Wednesday's only via CVC   • Shortness of breath 2025   [2]  Social History  Tobacco Use   • Smoking status: Former     Current packs/day: 0.00     Average packs/day: 0.6 packs/day for 17.0 years (10.0 ttl pk-yrs)     Types: Cigarettes     Start date: 1988     Quit date:      Years since quittin.5      Passive exposure: Never   • Smokeless tobacco: Never   Vaping Use   • Vaping status: Never Used   Substance Use Topics   • Alcohol use: Not Currently     Comment: Quit 2009   • Drug use: Not Currently     Types: Oral     Comment: NOT CURRENTLY, HISTORY OF CBD   [3]  Current Outpatient Medications Ordered in Epic   Medication Sig Dispense Refill   • amLODIPine (NORVASC) 10 MG Tab Take 1 Tablet by mouth every day. 100 Tablet 3   • oxyCODONE immediate-release (ROXICODONE) 5 MG Tab Take 1 Tablet by mouth every 8 hours as needed for Severe Pain for up to 5 days. 15 Tablet 0   • insulin regular (HUMULIN R/NOVOLIN R) 100 UNIT/ML Solution Inject 6-12 Units under the skin with meals as needed for High Blood Sugar.     • Insulin Degludec 100 UNIT/ML Solution Inject 20 Units under the skin every evening.     • Methoxy PEG-Epoetin Beta (MIRCERA) 150 MCG/0.3ML Solution Prefilled Syringe Inject 150 mcg as directed every 4 weeks. [facility] ([phone])     • NON SPECIFIED Administer 1 Each into both eyes see administration instructions. Eye injections every 4 to 6 weeks from [facility]     • atorvastatin (LIPITOR) 40 MG Tab Take 1 Tablet by mouth every evening. 90 Tablet 3   • cholestyramine (QUESTRAN) 4 g packet Take 4 g by mouth 2 times a day for 360 days. 60 Each 11   • torsemide (DEMADEX) 100 MG Tab Take 1 Tablet by mouth every day. 90 Tablet 3   • losartan (COZAAR) 100 MG Tab Take 1 Tablet by mouth every day. 90 Tablet 3   • metOLazone (ZAROXOLYN) 5 MG Tab Take 1 Tablet by mouth every day. 90 Tablet 3   • aspirin 81 MG EC tablet Take 1 Tablet by mouth every day. 30 Tablet 2   • omeprazole (PRILOSEC) 20 MG delayed-release capsule Take 20 mg by mouth every morning.          No current Epic-ordered facility-administered medications on file.   Subjective:     Beni Moore is a 54 y.o. male who presents for Hospital Follow-up.    Transitional Care Management  TCM Outreach Date and Time: Filed (6/25/2025  9:21  AM)    Discharge Questions  Actual Discharge Date: 06/24/25  Now that you are home, how are you feeling?: Good  Did you receive any new prescriptions?: Yes  Were you able to get them filled?: Yes  Meds to Bed or Pharmacy filled?: Meds to Bed  Do you have any questions about your current medications or new medications (Review Med Rec)?: No  Did you have any durable medical equipment ordered?: No  Do you have a follow up appointment scheduled with your PCP?: Yes  Appointment Date: 06/27/25  Appointment Time: 1420  Any issues or paperwork you wish to discuss with your PCP?: No  Are you (patient) able to get to the appointment?: Yes  If Home Health was ordered, have they contacted you (Patient): Not Applicable  Did you have enough support after your last discharge?: Yes  Does this patient qualify for the CCM program?: No (medicare)    Transitional Care  Number of attempts made to contact patient: 1  Current or previous attempts completed within two business days of discharge? : Yes  Provided education regarding treatment plan, medications, self-management, ADLs?: No  Has patient completed an Advanced Directive?: No  Has the Care Manager's phone number provided?: No  Is there anything else I can help you with?: No    Discharge Summary  Chief Complaint: Fever        BIB [facility] from dialysis after patient had fatigue and shivering during session today. He got most of his session. He has wound to right lower extremity, PMH DM with left aka. He was given 650mg tylenol for fever PTA. They placed him on 3L NC for comfort, but sating well on RA on arrival  Admitting Diagnosis: Cellulitis  Discharge Diagnosis: Cellulitis        HPI:   Recently hospitalized for ***    Current medicines (including reconciliation performed today)  Current Outpatient Medications   Medication Sig Dispense Refill   • amLODIPine (NORVASC) 10 MG Tab Take 1 Tablet by mouth every day. 100 Tablet 3   • oxyCODONE immediate-release (ROXICODONE) 5 MG Tab Take  1 Tablet by mouth every 8 hours as needed for Severe Pain for up to 5 days. 15 Tablet 0   • insulin regular (HUMULIN R/NOVOLIN R) 100 UNIT/ML Solution Inject 6-12 Units under the skin with meals as needed for High Blood Sugar.     • Insulin Degludec 100 UNIT/ML Solution Inject 20 Units under the skin every evening.     • Methoxy PEG-Epoetin Beta (MIRCERA) 150 MCG/0.3ML Solution Prefilled Syringe Inject 150 mcg as directed every 4 weeks. [facility] ([phone])     • NON SPECIFIED Administer 1 Each into both eyes see administration instructions. Eye injections every 4 to 6 weeks from [facility]     • atorvastatin (LIPITOR) 40 MG Tab Take 1 Tablet by mouth every evening. 90 Tablet 3   • cholestyramine (QUESTRAN) 4 g packet Take 4 g by mouth 2 times a day for 360 days. 60 Each 11   • torsemide (DEMADEX) 100 MG Tab Take 1 Tablet by mouth every day. 90 Tablet 3   • losartan (COZAAR) 100 MG Tab Take 1 Tablet by mouth every day. 90 Tablet 3   • metOLazone (ZAROXOLYN) 5 MG Tab Take 1 Tablet by mouth every day. 90 Tablet 3   • aspirin 81 MG EC tablet Take 1 Tablet by mouth every day. 30 Tablet 2   • omeprazole (PRILOSEC) 20 MG delayed-release capsule Take 20 mg by mouth every morning.          No current facility-administered medications for this visit.       Allergies:   Patient has no known allergies.    Social History     Tobacco Use   • Smoking status: Former     Current packs/day: 0.00     Average packs/day: 0.6 packs/day for 17.0 years (10.0 ttl pk-yrs)     Types: Cigarettes     Start date: 1988     Quit date:      Years since quittin.5     Passive exposure: Never   • Smokeless tobacco: Never   Vaping Use   • Vaping status: Never Used   Substance Use Topics   • Alcohol use: Not Currently     Comment: Quit    • Drug use: Not Currently     Types: Oral     Comment: NOT CURRENTLY, HISTORY OF CBD       ROS:  ***    Objective:     Vitals:    25 1356   BP: 116/64   BP Location: Right arm    Patient Position: Sitting   BP Cuff Size: Adult   Pulse: 85   Resp: 16   Temp: 37.2 °C (98.9 °F)   SpO2: 97%   Weight: 89.8 kg (198 lb)   Height: 1.829 m (6')     Body mass index is 26.85 kg/m².    Physical Exam:  ***    Assessment and Plan:   1. Cellulitis of right lower extremity    2. Shoulder problem    3. Type 2 diabetes mellitus with proliferative retinopathy without macular edema, without long-term current use of insulin, unspecified laterality (HCC)    4. Hospital discharge follow-up      - Chart and discharge summary were reviewed.   - Hospitalization and results reviewed with patient.   - Medications reviewed including instructions regarding high risk medications, dosing and side effects.  - Recommended Services: {COORDINATION OF SERVICES:20405}  - Advance directive/POLST on file?  {Yes/No:20266}    Follow-up:No follow-ups on file.    Face-to-face transitional care management services with {TCM Complexity:90326} medical decision complexity were provided.     {   Reference text will not save to note  Coding guide  43804  face-to-face within 14 days  Moderate decision complexity  07296  face-to-face within 7 days  High medical decision complexity    :15656}

## 2025-07-02 ENCOUNTER — OFFICE VISIT (OUTPATIENT)
Dept: INFECTIOUS DISEASES | Facility: MEDICAL CENTER | Age: 55
End: 2025-07-02
Attending: NURSE PRACTITIONER
Payer: MEDICARE

## 2025-07-02 VITALS
HEIGHT: 72 IN | OXYGEN SATURATION: 97 % | TEMPERATURE: 97.8 F | WEIGHT: 189.6 LBS | DIASTOLIC BLOOD PRESSURE: 76 MMHG | RESPIRATION RATE: 17 BRPM | BODY MASS INDEX: 25.68 KG/M2 | HEART RATE: 73 BPM | SYSTOLIC BLOOD PRESSURE: 160 MMHG

## 2025-07-02 DIAGNOSIS — Z96.612 HISTORY OF ARTHROPLASTY OF LEFT SHOULDER: ICD-10-CM

## 2025-07-02 DIAGNOSIS — N18.6 TYPE 2 DIABETES MELLITUS WITH CHRONIC KIDNEY DISEASE ON CHRONIC DIALYSIS, WITH LONG-TERM CURRENT USE OF INSULIN (HCC): ICD-10-CM

## 2025-07-02 DIAGNOSIS — R78.81 MSSA BACTEREMIA: Primary | ICD-10-CM

## 2025-07-02 DIAGNOSIS — L03.115 CELLULITIS OF RIGHT LOWER EXTREMITY: ICD-10-CM

## 2025-07-02 DIAGNOSIS — B95.61 MSSA BACTEREMIA: Primary | ICD-10-CM

## 2025-07-02 DIAGNOSIS — Z79.4 TYPE 2 DIABETES MELLITUS WITH CHRONIC KIDNEY DISEASE ON CHRONIC DIALYSIS, WITH LONG-TERM CURRENT USE OF INSULIN (HCC): ICD-10-CM

## 2025-07-02 DIAGNOSIS — Z99.2 TYPE 2 DIABETES MELLITUS WITH CHRONIC KIDNEY DISEASE ON CHRONIC DIALYSIS, WITH LONG-TERM CURRENT USE OF INSULIN (HCC): ICD-10-CM

## 2025-07-02 DIAGNOSIS — M86.172 OTHER ACUTE OSTEOMYELITIS OF LEFT FOOT (HCC): ICD-10-CM

## 2025-07-02 DIAGNOSIS — E11.22 TYPE 2 DIABETES MELLITUS WITH CHRONIC KIDNEY DISEASE ON CHRONIC DIALYSIS, WITH LONG-TERM CURRENT USE OF INSULIN (HCC): ICD-10-CM

## 2025-07-02 PROCEDURE — 99212 OFFICE O/P EST SF 10 MIN: CPT | Performed by: NURSE PRACTITIONER

## 2025-07-02 PROCEDURE — 3078F DIAST BP <80 MM HG: CPT | Performed by: NURSE PRACTITIONER

## 2025-07-02 PROCEDURE — 3077F SYST BP >= 140 MM HG: CPT | Performed by: NURSE PRACTITIONER

## 2025-07-02 PROCEDURE — 99215 OFFICE O/P EST HI 40 MIN: CPT | Performed by: NURSE PRACTITIONER

## 2025-07-02 ASSESSMENT — ENCOUNTER SYMPTOMS
WEIGHT LOSS: 0
WHEEZING: 0
FOCAL WEAKNESS: 0
BLURRED VISION: 0
NERVOUS/ANXIOUS: 0
CHILLS: 0
MYALGIAS: 0
FEVER: 0
SHORTNESS OF BREATH: 0
NAUSEA: 0
DOUBLE VISION: 0
ABDOMINAL PAIN: 0
HEADACHES: 0
PALPITATIONS: 0
DIARRHEA: 0
BRUISES/BLEEDS EASILY: 0
VOMITING: 0
COUGH: 0
DIZZINESS: 0
CONSTIPATION: 0
SPUTUM PRODUCTION: 0

## 2025-07-02 ASSESSMENT — FIBROSIS 4 INDEX: FIB4 SCORE: 3.02

## 2025-07-02 NOTE — PROGRESS NOTES
INFECTIOUS  DISEASE  OUTPATIENT CLINIC  NOTE   Subjective   Primary care provider: Florinda Pascual M.D..     Reason for Follow Up:   Follow-up for   1. MSSA bacteremia        2. Other acute osteomyelitis of left foot (HCC)        3. Cellulitis of right lower extremity        4. Type 2 diabetes mellitus with chronic kidney disease on chronic dialysis, with long-term current use of insulin (HCC)        5. History of arthroplasty of left shoulder          HPI: Patient previously seen and treated by ID team as inpatient during hospital admission.   Beni Moore is a 54 y.o. male with a history of ESRD on hemodialysis, type 2 diabetes mellitus, history of group A streptococcus bacteremia in March secondary to cellulitis admitted on 6/18/2025 secondary to fever and malaise. Patient now found to have MSSA bacteremia. Source likely cellulitis and wound infection.  Blood cultures on 6/18 positive for MSSA, repeat blood cultures on 6/20 negative to date. Right lower extremity cellulitis, wound cultures on 6/18 positive for MSSA. MRI with contrast on 6/22 notes enhancement at T11-12 disc as well as L1-2. Findings suspicious for early T11-L2 infection. No periventricular fluid collection, no epidural fluid collection.  History of right TMA and left BKA along with group A strep bacteremia in March 2025.  History of left shoulder hardware in place with chronic pain she states has been unchanged.  MACKENZIE with no vegetations.  Repeat blood cultures on 6/20 negative today.  Discharge on IV cefazolin with dialysis 2/2/3G on days of dialysis, 6-week course the end date on 8/1/2025.  After 6-week course potentially transition to oral antibiotic therapy for 3 months due to concerns of hardware in left shoulder    07/02/25- Today Patient reports feeling well. Pt stating that the wound is healing well. Wound pictures reviewed in EPIC. Denies drainage, pungent odor, redness, pain. Denies feeling generally ill, fevers/chills, general  malaise, headache, n/v/d. 6-week course of IV cefazolin with dialysis 2/2/3G on days of dialysis, end date 8/1/2025 followed by 3 months of p.o. Augmentin 500/125 mg once daily to prevent infection of left shoulder arthroplasty.  Most recent labs on file from 6/23/2025 leukopenia 4.0, stable anemia 9.6/30.2, chronic thrombocytopenia 105 slowly improving, CMP elevated creatinine 5.31 and decreased GFR 12, consistent with ESRD.  Will reach out to [facility] to have them fax over the most recent labs.  After completion of IV antibiotic therapy we will transition to 3 months of p.o. Augmentin.  Recommended to start daily probiotic.    Current Antimicrobials: 6-week course of IV cefazolin with dialysis 2/2/3G on days of dialysis, end date 8/1/2025 followed by 3 months of p.o. Augmentin 500/125 mg once daily to prevent infection of left shoulder arthroplasty  Previous Antimicrobials:    Other Current Medications:  Home Medications   Medication Sig Taking? Last Dose Authorizing Provider   amLODIPine (NORVASC) 10 MG Tab Take 1 Tablet by mouth every day. Yes  Kylah Wing M.D.   insulin regular (HUMULIN R/NOVOLIN R) 100 UNIT/ML Solution Inject 6-12 Units under the skin with meals as needed for High Blood Sugar. Yes  Physician Outpatient   Insulin Degludec 100 UNIT/ML Solution Inject 20 Units under the skin every evening. Yes  Physician Outpatient   Methoxy PEG-Epoetin Beta (MIRCERA) 150 MCG/0.3ML Solution Prefilled Syringe Inject 150 mcg as directed every 4 weeks. [facility] ([phone]) Yes  Physician Outpatient   NON SPECIFIED Administer 1 Each into both eyes see administration instructions. Eye injections every 4 to 6 weeks from [facility] Yes  Physician Outpatient   atorvastatin (LIPITOR) 40 MG Tab Take 1 Tablet by mouth every evening. Yes  Rl Garcia M.D.   cholestyramine (QUESTRAN) 4 g packet Take 4 g by mouth 2 times a day for 360 days. Yes  Florinda Pascual M.D.   torsemide (DEMADEX) 100 MG Tab  Take 1 Tablet by mouth every day. Yes  Varun Ortiz M.D.   losartan (COZAAR) 100 MG Tab Take 1 Tablet by mouth every day. Yes  Varun Ortiz M.D.   metOLazone (ZAROXOLYN) 5 MG Tab Take 1 Tablet by mouth every day. Yes  Varun Ortiz M.D.   aspirin 81 MG EC tablet Take 1 Tablet by mouth every day. Yes  Mena Ny D.O.   omeprazole (PRILOSEC) 20 MG delayed-release capsule Take 20 mg by mouth every morning.    Yes  [facility] Per Protocol, [facility]        PMH:  Past Medical History[1]  Past Surgical History[2]  Family History   Problem Relation Age of Onset    No Known Problems Mother     Diabetes Father         Adult onset    Heart Disease Father     Hypertension Father     Diabetes Maternal Grandmother     Heart Disease Maternal Grandfather     Lung Disease Paternal Grandmother     Cancer Paternal Grandmother     Cancer Paternal Grandfather     Lung Cancer Paternal Grandfather     Kidney Disease Neg Hx      Social History     Socioeconomic History    Marital status:      Spouse name: Not on file    Number of children: Not on file    Years of education: Not on file    Highest education level: Some college, no degree   Occupational History    Not on file   Tobacco Use    Smoking status: Former     Current packs/day: 0.00     Average packs/day: 0.6 packs/day for 17.0 years (10.0 ttl pk-yrs)     Types: Cigarettes     Start date: 1988     Quit date:      Years since quittin.5     Passive exposure: Never    Smokeless tobacco: Never   Vaping Use    Vaping status: Never Used   Substance and Sexual Activity    Alcohol use: Not Currently     Comment: Quit     Drug use: Not Currently     Types: Oral     Comment: NOT CURRENTLY, HISTORY OF CBD    Sexual activity: Not Currently     Partners: Female   Other Topics Concern    Not on file   Social History Narrative    Not on file     Social Drivers of Health     Financial Resource Strain: Low Risk  (2022)    Overall Financial Resource Strain  (CARDIA)     Difficulty of Paying Living Expenses: Not hard at all   Food Insecurity: No Food Insecurity (3/28/2025)    Hunger Vital Sign     Worried About Running Out of Food in the Last Year: Never true     Ran Out of Food in the Last Year: Never true   Transportation Needs: No Transportation Needs (3/28/2025)    PRAPARE - Transportation     Lack of Transportation (Medical): No     Lack of Transportation (Non-Medical): No   Physical Activity: Insufficiently Active (12/13/2022)    Exercise Vital Sign     Days of Exercise per Week: 2 days     Minutes of Exercise per Session: 40 min   Stress: No Stress Concern Present (12/13/2022)    [employer] - Occupational Stress Questionnaire     Feeling of Stress : Not at all   Social Connections: Moderately Integrated (12/13/2022)    Social Connection and Isolation Panel [NHANES]     Frequency of Communication with Friends and Family: More than three times a week     Frequency of Social Gatherings with Friends and Family: More than three times a week     Attends [employer]ism Services: Never     Active Member of Clubs or Organizations: Yes     Attends Club or Organization Meetings: Never     Marital Status:    Intimate Partner Violence: Not At Risk (3/28/2025)    Humiliation, Afraid, Rape, and Kick questionnaire     Fear of Current or Ex-Partner: No     Emotionally Abused: No     Physically Abused: No     Sexually Abused: No   Housing Stability: Low Risk  (3/28/2025)    Housing Stability Vital Sign     Unable to Pay for Housing in the Last Year: No     Number of Times Moved in the Last Year: 0     Homeless in the Last Year: No           Allergies/Intolerances:  Allergies[3]    ROS:   Review of Systems   Constitutional:  Negative for chills, fever, malaise/fatigue and weight loss.   HENT:  Negative for congestion and hearing loss.    Eyes:  Negative for blurred vision and double vision.   Respiratory:  Negative for cough, sputum production,  shortness of breath and wheezing.    Cardiovascular:  Negative for chest pain and palpitations.   Gastrointestinal:  Negative for abdominal pain, constipation, diarrhea, nausea and vomiting.   Genitourinary:  Negative for dysuria.   Musculoskeletal:  Negative for myalgias.   Skin:  Negative for itching and rash.   Neurological:  Negative for dizziness, focal weakness and headaches.   Endo/Heme/Allergies:  Does not bruise/bleed easily.   Psychiatric/Behavioral:  The patient is not nervous/anxious.       ROS was reviewed and were negative except as above.    Objective    Most Recent Vital Signs:  BP (!) 160/76   Pulse 73   Temp 36.6 °C (97.8 °F) (Temporal)   Resp 17   Ht 1.829 m (6')   Wt 86 kg (189 lb 9.5 oz)   SpO2 97%   BMI 25.71 kg/m²     Physical Exam:  Physical Exam  Vitals reviewed.   Constitutional:       Appearance: Normal appearance.      Comments: Wheelchair   HENT:      Head: Normocephalic and atraumatic.      Nose: Nose normal.      Mouth/Throat:      Mouth: Mucous membranes are moist.   Eyes:      Pupils: Pupils are equal, round, and reactive to light.   Cardiovascular:      Rate and Rhythm: Normal rate.   Pulmonary:      Effort: Pulmonary effort is normal.      Breath sounds: Normal breath sounds.   Abdominal:      Palpations: Abdomen is soft.   Musculoskeletal:         General: No tenderness.      Cervical back: Normal range of motion and neck supple.      Comments: Left BKA  Right lower extremity cellulitis resolved   Skin:     General: Skin is warm and dry.      Coloration: Skin is not jaundiced.      Findings: No erythema or rash.   Neurological:      Mental Status: He is alert and oriented to person, place, and time.      Motor: No weakness.   Psychiatric:         Mood and Affect: Mood normal.         Behavior: Behavior normal.          Pertinent Lab/Imaging Results:  [unfilled]  @CMP@  WBC   Date/Time Value Ref Range Status   06/23/2025 02:48 AM 4.0 (L) 4.8 - 10.8 K/uL Final     RBC    Date/Time Value Ref Range Status   06/23/2025 02:48 AM 3.35 (L) 4.70 - 6.10 M/uL Final     Hemoglobin   Date/Time Value Ref Range Status   06/23/2025 02:48 AM 9.6 (L) 14.0 - 18.0 g/dL Final     Hematocrit   Date/Time Value Ref Range Status   06/23/2025 02:48 AM 30.2 (L) 42.0 - 52.0 % Final     MCV   Date/Time Value Ref Range Status   06/23/2025 02:48 AM 90.1 81.4 - 97.8 fL Final     MCH   Date/Time Value Ref Range Status   06/23/2025 02:48 AM 28.7 27.0 - 33.0 pg Final     MCHC   Date/Time Value Ref Range Status   06/23/2025 02:48 AM 31.8 (L) 32.3 - 36.5 g/dL Final     MPV   Date/Time Value Ref Range Status   06/23/2025 02:48 AM 10.9 9.0 - 12.9 fL Final      Sodium   Date/Time Value Ref Range Status   06/24/2025 06:18  135 - 145 mmol/L Final     Potassium   Date/Time Value Ref Range Status   06/24/2025 06:18 AM 4.2 3.6 - 5.5 mmol/L Final     Chloride   Date/Time Value Ref Range Status   06/24/2025 06:18 AM 98 96 - 112 mmol/L Final     Co2   Date/Time Value Ref Range Status   06/24/2025 06:18 AM 26 20 - 33 mmol/L Final     Glucose   Date/Time Value Ref Range Status   06/24/2025 06:18  (H) 65 - 99 mg/dL Final     Bun   Date/Time Value Ref Range Status   06/24/2025 06:18 AM 23 (H) 8 - 22 mg/dL Final     Creatinine   Date/Time Value Ref Range Status   06/24/2025 06:18 AM 5.31 (HH) 0.50 - 1.40 mg/dL Final     Alkaline Phosphatase   Date/Time Value Ref Range Status   06/18/2025 11:50 AM 77 30 - 99 U/L Final     AST(SGOT)   Date/Time Value Ref Range Status   06/18/2025 11:50 AM 17 12 - 45 U/L Final     ALT(SGPT)   Date/Time Value Ref Range Status   06/18/2025 11:50 AM 9 2 - 50 U/L Final     Total Bilirubin   Date/Time Value Ref Range Status   06/18/2025 11:50 AM 1.2 0.1 - 1.5 mg/dL Final      CPK Total   Date/Time Value Ref Range Status   01/26/2024 08:34  (H) 0 - 154 U/L Final          Blood Culture   Date Value Ref Range Status   06/30/2018 No growth after 5 days of incubation.  Final       Blood  Culture   Date Value Ref Range Status   06/30/2018 No growth after 5 days of incubation.  Final    Blood Culture - Draw one from central line and one from peripheral site  Order: [id number]   Status: Edited Result - FINAL       Next appt: Today at 01:30 PM in Infectious Diseases ([facility] A[facility]    Test Result Released: Yes (not seen)    Specimen Information: Line; Blood   0 Result Notes      Component  Ref Range & Units (hover) 3 mo ago   Significant Indicator POS Positive (POS)   Source BLD   Site Peripheral   Culture Result  Abnormal   Growth detected by automated blood culture system. 03/29/2025  10:55    Culture Result  Abnormal   Streptococcus pyogenes (Group A)  This isolate does NOT demonstrate inducible Clindamycin  resistance in vitro.    Resulting Agency [facility]        Susceptibility     Streptococcus pyogenes (group a)     KB     Cefotaxime 0.012 mcg/mL Sensitive     Clindamycin - mm Sensitive     Erythromycin 0.125 mcg/mL Sensitive     Penicillin 0.016 mcg/mL Sensitive                 Specimen Collected: 03/28/25  4:32 PM Last Resulted: 04/01/25  7:42 AM   Culture Wound w/Gram Stain  Order: [id number]   Status: Final result       Next appt: Today at 01:30 PM in Infectious Diseases ([facility]    Test Result Released: Yes (not seen)    Specimen Information: Left Leg; Wound   0 Result Notes      Component  Ref Range & Units (hover) 3 mo ago   Significant Indicator POS Positive (POS)   Source WND   Site LEFT LEG   Culture Result - Abnormal    Gram Stain Result No organisms seen.   Culture Result  Abnormal   Streptococcus pyogenes (Group A)  Moderate growth    Resulting Agency [facility]             Specimen Collected: 03/29/25 11:30 AM Last Resulted: 03/31/25 10:34 AM   ntains abnormal data Blood Culture - Draw one from central line and one from peripheral site  Order: [id number]   Status: Final result       Next appt: Today at 01:30 PM in Infectious Diseases ([facility]    Test Result  Released: Yes (not seen)    Specimen Information: Line; Blood   0 Result Notes      Component  Ref Range & Units (hover) 2 wk ago   Significant Indicator POS Positive (POS)   Source BLD   Site Peripheral   Culture Result  Abnormal   Growth detected by automated blood culture system. 06/19/2025  01:46    Culture Result Staphylococcus aureus Abnormal    Resulting Agency [facility]        Susceptibility     Staphylococcus aureus     IRINEO     Ampicillin/sulbactam <=8/4 mcg/mL Sensitive     Cefazolin <=8 mcg/mL Sensitive     Cefepime <=4 mcg/mL Sensitive     Clindamycin 0.5 mcg/mL Sensitive     Daptomycin 1 mcg/mL Sensitive     Erythromycin >4 mcg/mL Resistant     Oxacillin <=0.25 mcg/mL Sensitive     Tetracycline <=4 mcg/mL Sensitive     Trimeth/Sulfa <=0.5/9.5 m... Sensitive     Vancomycin 1 mcg/mL Sensitive                 Specimen Collected: 06/18/25 11:50 AM Last Resulted: 06/21/25 10:04 AM   ontains abnormal data Blood Culture - Draw one from central line and one from peripheral site  Order: [id number]   Status: Final result       Next appt: Today at 01:30 PM in Infectious Diseases ([facility] A[facility]    Test Result Released: Yes (not seen)    Specimen Information: Peripheral; Blood   0 Result Notes      Component  Ref Range & Units (hover) 2 wk ago   Significant Indicator POS Positive (POS)   Source BLD   Site PERIPHERAL   Culture Result  Abnormal   Growth detected by automated blood culture system. 06/19/2025  01:44  Staphylococcus aureus (methicillin sensitive)  detected by PCR.    Culture Result  Abnormal   Staphylococcus aureus  See previous culture for sensitivity report.    Resulting Agency [facility]             Specimen Collected: 06/18/25 12:07 PM Last Resulted: 06/21/25 10:04 AM          Latest Reference Range & Units 03/31/25 12:57 04/02/25 01:14 06/18/25 11:50 06/18/25 12:07 06/18/25 14:56 06/20/25 03:30 06/20/25 03:36   MRSA by PCR Negative      Negative     RSV, PCR Negative    Negative       SARS-CoV-2 by PCR     NotDetected       Significant Indicator  NEG NEG  NEG POS !  POS !  . POS !  NEG NEG   Site  PERIPHERAL PERIPHERAL  PERIPHERAL Peripheral  RIGHT LEG  RIGHT LEG PERIPHERAL  PERIPHERAL PERIPHERAL   Source  BLD BLD  BLD BLD  WND  WND BLD  BLD BLD   !: Data is abnormal      MR-LUMBAR SPINE-WITH & W/O  Order: [id number]   Status: Final result       Next appt: Today at 01:30 PM in Infectious Diseases ([facility]    Test Result Released: Yes (seen)    0 Result Notes  Details    Reading Physician Reading Date Result Priority   Jaiem Logan M.D.  [phone] 6/22/2025      Narrative & Impression     6/22/2025 5:39 PM     HISTORY/REASON FOR EXAM:  lower back pain, setting of MSSA bacteremia, evaluate for infection.        TECHNIQUE/EXAM DESCRIPTION:  MRI of the lumbar spine without and with contrast.     Multiplanar multisequence MR examination of the lumbar spine. 20 mL ProHance contrast was administered intravenously.     COMPARISON:  None.     FINDINGS:  There is abnormal T2 hyperintensity and contrast enhancement of midportion of the T11-12 disc. There is focal adjacent endplate irregularities with contrast enhancement. There is also abnormal T2 hyperintensity of midportion of L1-2 disc. There is also   endplate irregularity and contrast enhancement and edema noted involving the adjacent superior endplate of L2. There is no periventricular fluid collection. There is no epidural fluid collection.     At the level of L5-S1,there is no spinal or neural foraminal stenosis.     At the level of L4-5,there is minimal disc bulge. There is no spinal or neural foraminal stenosis.     At the level of L3-4,there is no spinal or neural foraminal stenosis.     At the level of L2-3,there is no spinal or neural foraminal stenosis.     At the level of L1-2,there is no spinal or neural foraminal stenosis.     The conus terminates at the level of L1. The visualized lower thoracic spinal cord is unremarkable. There is no  lumbar epidural lesion.     The visualized pre-and paraspinal soft tissues appear normal.     There is no abnormal contrast enhancement.     IMPRESSION:     There is abnormal T2 hyperintensity and contrast enhancement of midportion of the T11-12 disc. There is focal adjacent endplate irregularities with contrast enhancement. There is also abnormal T2 hyperintensity of the midportion of L1-2 disc. There is   also endplate irregularity ,contrast enhancement and edema noted involving the adjacent superior endplate of L2. In view of history of MSSA bacteremia, these findings are suspicious for early T11-12 and L1-2 infection. However erosive spondyloarthropathy   in the setting of end-stage renal failure may produce similar finding. There is no periventricular fluid collection. There is no epidural fluid collection.        Exam Ended: 06/22/25  6:26 PM Last Resulted: 06/22/25 10:19 PM           Impression/Assessment      1. MSSA bacteremia        2. Other acute osteomyelitis of left foot ([facility])        3. Cellulitis of right lower extremity        4. Type 2 diabetes mellitus with chronic kidney disease on chronic dialysis, with long-term current use of insulin ([facility])        5. History of arthroplasty of left shoulder          Beni Moore is a 54 y.o. male with a history of ESRD on hemodialysis, type 2 diabetes mellitus, history of group A streptococcus bacteremia in March secondary to cellulitis admitted on 6/18/2025 secondary to fever and malaise. Patient now found to have MSSA bacteremia. Source likely cellulitis and wound infection.  Blood cultures on 6/18 positive for MSSA, repeat blood cultures on 6/20 negative to date. Right lower extremity cellulitis, wound cultures on 6/18 positive for MSSA. MRI with contrast on 6/22 notes enhancement at T11-12 disc as well as L1-2. Findings suspicious for early T11-L2 infection. No periventricular fluid collection, no epidural fluid collection.  History of right TMA and left  BKA along with group A strep bacteremia in March 2025.  History of left shoulder hardware in place with chronic pain she states has been unchanged.  MACKENZIE with no vegetations.  Repeat blood cultures on 6/20 negative today.  Discharge on IV cefazolin with dialysis 2/2/3G on days of dialysis, 6-week course the end date on 8/1/2025.  After 6-week course potentially transition to oral antibiotic therapy for 3 months due to concerns of hardware in left shoulder    07/02/25- Today Patient reports feeling well. Pt stating that the wound is healing well. Wound pictures reviewed in EPIC. Denies drainage, pungent odor, redness, pain. Denies feeling generally ill, fevers/chills, general malaise, headache, n/v/d. 6-week course of IV cefazolin with dialysis 2/2/3G on days of dialysis, end date 8/1/2025 followed by 3 months of p.o. Augmentin 500/125 mg once daily to prevent infection of left shoulder arthroplasty.  Most recent labs on file from 6/23/2025 leukopenia 4.0, stable anemia 9.6/30.2, chronic thrombocytopenia 105 slowly improving, CMP elevated creatinine 5.31 and decreased GFR 12, consistent with ESRD.  Will reach out to [facility] to have them fax over the most recent labs.  After completion of IV antibiotic therapy we will transition to 3 months of p.o. Augmentin.  Recommended to start daily probiotic.    - This infection/ chronic illness posses a threat to life, bodily function, and limb preservation   PLAN:   - Continue 6-week course of IV cefazolin with dialysis 2/2/3G on days of dialysis, end date 8/1/2025 followed by 3 months of p.o. Augmentin 500/125 mg once daily to prevent infection of left shoulder arthroplasty  - Repeat Labs CBC/CMP weekly while receiving IV antibiotics. Monitoring for side effects, medication toxicity, medication interactions, and for infection  - Medication education provided and S/S of side effects discussed   - Recommend routine follow up with nephrology/PCP  - Continue wound care to right  lower extremity  - Recommended to start po probiotic  - Recommend check blood sugars 2-3 times a day and keep below 150 provide secondary infection      Return visit: 2 weeks, okay for virtual. Follow up with primary care physician for chronic medical problems    I have performed a physical exam,  updated ROS and plan today. I have reviewed previous images, labs, and provider notes.      SIERRA Cobb.    All Patients should seek medical re-evaluation or report to the ER for new, increasing or worsening symptoms. In some circumstances medical conditions can change from the initial evaluation and may require emergent medical re-evaluation. This includes but is not limited to chest pain, shortness of breath, atypical abdominal pain, atypical headache, ALOC, fever >101, low blood pressure, high respiratory rate (above 30), low oxygen saturation (below 90%), acute delirium, abnormal bleeding, inability to tolerate any intake, weakness on one side of the body, any worsened or concerning conditions.    Please note that this dictation was created using voice recognition software. I have worked with technical experts from [employer] to optimize the interface.  I have made every reasonable attempt to correct obvious errors, but there may be errors of grammar and possibly content that I did not discover before finalizing the note.         [1]   Past Medical History:  Diagnosis Date    Anesthesia     Hypotension post-op, persisted for a few months x 1    Anesthesia 12/06/2023    Patient stated after 2016 colonoscopy he became hypotensive.    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    Heart  burn     GERD; takes Prilosec    Hemorrhagic disorder ([facility])     ASA protection for stent and cardiac history    High cholesterol     medicated x 2    History of non-ST elevation myocardial infarction (NSTEMI)     Per Problem List    Hx of heart artery stent 2019    Hyperlipidemia     Hyperparathyroidism due to renal insufficiency ([facility])     Per Problem List    Hypertension     medicated    Moderate mitral regurgitation 04/01/2025    Myocardial infarct ([facility]) 2019    FOLLOWED BY DR TO    Paroxysmal SVT (supraventricular tachycardia) ([facility] [facility]) -on telemetry and during [facility] atrial tachycardia likely 04/01/2025    Pneumonia     H/O    Renal disorder 12/06/2023    Stage 3 CKD; 12/9/2024 dialysis at [facility] on Wednesday's only via CVC    Shortness of breath 5/13/2025   [2]   Past Surgical History:  Procedure Laterality Date    AV FISTULA REVISION Left 1/6/2025    Procedure: LEFT BRACHIOBASILIC FISTULA TRANSPOSITION;  Surgeon: Joyce Ureña M.D.;  Location: SURGERY SAME DAY [facility];  Service: General    AV FISTULA CREATION Left 11/04/2024    Procedure: CREATION OF LEFT UPPER EXTREMITY  DIALYSIS FISTULA;  Surgeon: Joyce Ureña M.D.;  Location: SURGERY SAME DAY [facility];  Service: General    ORIF, FRACTURE, HUMERUS, PROXIMAL Left 08/30/2024    Procedure: LEFT OPEN REDUCTION INTERNAL FIXATION  HUMERUS, PROXIMAL, NON UNION REPAIR;  Surgeon: Aris Pierre M.D.;  Location: [facility];  Service: Orthopedics    KNEE AMPUTATION BELOW Left 02/01/2024    Procedure: AMPUTATION, BELOW KNEE;  Surgeon: Celestino Segura M.D.;  Location: [facility];  Service: Orthopedics    TOE AMPUTATION Left 01/30/2024    Procedure: AMPUTATION, TOE- 5TH;  Surgeon: Celestino Segura M.D.;  Location: SURGERY [facility];  Service: Orthopedics    PB OPEN TREATMENT PBOX HUMERAL FRACTURE Left 12/11/2023    Procedure: LEFT OPEN REDUCTION INTERNAL FIXATION  HUMERUS, PROXIMAL AND LEFT BICEPS TENODESIS;  Surgeon: Aris JUNIOR  JESENIA Pierre;  Location: [facility];  Service: Orthopedics    PB REPAIR BICEPS LONG TENDON Left 12/11/2023    Procedure: TENODESIS, BICEPS, OPEN;  Surgeon: Aris Pierre M.D.;  Location: [facility];  Service: Orthopedics    CATARACT EXTRACTION WITH IOL Bilateral 2023    TOE AMPUTATION Left 09/30/2021    Procedure: AMPUTATION, TOE;  Surgeon: Tomer Hart M.D.;  Location: [facility];  Service: Orthopedics    STENT PLACEMENT  2019    STEMI with nonobstructive LAD    TOE AMPUTATION Right 07/02/2018    Procedure: Transmetatarsal amputation;  Surgeon: Ravi Bernardo M.D.;  Location: [facility];  Service: Orthopedics    ACHILLES TENDON REPAIR Right 07/02/2018    Procedure: ACHILLES LENGTHENING;  Surgeon: Ravi Bernardo M.D.;  Location: [facility];  Service: Orthopedics    IRRIGATION & DEBRIDEMENT ORTHO Right 07/02/2018    Procedure: IRRIGATION & DEBRIDEMENT ORTHO;  Surgeon: Ravi Bernardo M.D.;  Location: [facility];  Service: Orthopedics    OTHER CARDIAC SURGERY  09/19/2019    Stent installed right side    OTHER ORTHOPEDIC SURGERY      SHOULDER SURGERY  12/11/2023    Something is not right in my shoulder/arm    TONSILLECTOMY      VASECTOMY     [3] No Known Allergies

## 2025-07-18 ENCOUNTER — TELEPHONE (OUTPATIENT)
Facility: MEDICAL CENTER | Age: 55
End: 2025-07-18
Payer: MEDICARE

## 2025-07-18 NOTE — TELEPHONE ENCOUNTER
FC left  and asked pt to please call FC back, per pt's SW at his dialysis center he has questions about cash pay for Transplant and about his ins.    FC received call back from pt. Pt stated that he only has Medicare, and he was wondering how much a Transplant would cost if he was to pay cash, FC educated pt on the cost and let him know about how much an eval could cost and how we could use his ins. For imaging and labs, but he would owe the 20% cause he doesn't have a secondary ins. I also educated pt about how he could look into a secondary ins. FC also educated pt on roughly what the transplant could cost and let him know that I would talk to my  and see if we can talk to [facility] about cash pay costs. Pt stated that he is struggling to pay his premiums and bills each month, FC educated pt to talk to his dialysis SW about [employer]. Pt was very nice and was very appreciative of  phone call and education.FC let pt know that she would get back to him about the cash pay part.

## 2025-08-05 ENCOUNTER — OFFICE VISIT (OUTPATIENT)
Dept: MEDICAL GROUP | Facility: PHYSICIAN GROUP | Age: 55
End: 2025-08-05
Payer: MEDICARE

## 2025-08-05 VITALS
HEIGHT: 72 IN | DIASTOLIC BLOOD PRESSURE: 74 MMHG | BODY MASS INDEX: 29.26 KG/M2 | OXYGEN SATURATION: 96 % | WEIGHT: 216.05 LBS | SYSTOLIC BLOOD PRESSURE: 156 MMHG | HEART RATE: 71 BPM | RESPIRATION RATE: 16 BRPM | TEMPERATURE: 97.7 F

## 2025-08-05 DIAGNOSIS — Z79.4 TYPE 2 DIABETES MELLITUS WITH CHRONIC KIDNEY DISEASE ON CHRONIC DIALYSIS, WITH LONG-TERM CURRENT USE OF INSULIN (HCC): ICD-10-CM

## 2025-08-05 DIAGNOSIS — E11.22 TYPE 2 DIABETES MELLITUS WITH CHRONIC KIDNEY DISEASE ON CHRONIC DIALYSIS, WITH LONG-TERM CURRENT USE OF INSULIN (HCC): ICD-10-CM

## 2025-08-05 DIAGNOSIS — Z89.512 HX OF BKA, LEFT (HCC): Primary | ICD-10-CM

## 2025-08-05 DIAGNOSIS — S42.202D CLOSED FRACTURE OF PROXIMAL END OF LEFT HUMERUS WITH ROUTINE HEALING, UNSPECIFIED FRACTURE MORPHOLOGY, SUBSEQUENT ENCOUNTER: ICD-10-CM

## 2025-08-05 DIAGNOSIS — N18.6 TYPE 2 DIABETES MELLITUS WITH CHRONIC KIDNEY DISEASE ON CHRONIC DIALYSIS, WITH LONG-TERM CURRENT USE OF INSULIN (HCC): ICD-10-CM

## 2025-08-05 DIAGNOSIS — Z99.2 TYPE 2 DIABETES MELLITUS WITH CHRONIC KIDNEY DISEASE ON CHRONIC DIALYSIS, WITH LONG-TERM CURRENT USE OF INSULIN (HCC): ICD-10-CM

## 2025-08-05 DIAGNOSIS — M25.9 SHOULDER PROBLEM: ICD-10-CM

## 2025-08-05 PROCEDURE — 3077F SYST BP >= 140 MM HG: CPT | Performed by: FAMILY MEDICINE

## 2025-08-05 PROCEDURE — 99213 OFFICE O/P EST LOW 20 MIN: CPT | Performed by: FAMILY MEDICINE

## 2025-08-05 PROCEDURE — 3078F DIAST BP <80 MM HG: CPT | Performed by: FAMILY MEDICINE

## 2025-08-05 RX ORDER — CEFAZOLIN SODIUM 1 G/3ML
INJECTION, POWDER, FOR SOLUTION INTRAMUSCULAR; INTRAVENOUS
COMMUNITY

## 2025-08-05 RX ORDER — CINACALCET 30 MG/1
TABLET, FILM COATED ORAL
COMMUNITY

## 2025-08-05 RX ORDER — RANIBIZUMAB 6 MG/ML
INJECTION, SOLUTION INTRAVITREAL
COMMUNITY

## 2025-08-05 RX ORDER — CALCITRIOL 0.5 UG/1
1 CAPSULE, LIQUID FILLED ORAL
COMMUNITY

## 2025-08-05 RX ORDER — OXYCODONE HYDROCHLORIDE 15 MG/1
TABLET ORAL
COMMUNITY

## 2025-08-05 RX ORDER — CALCIUM ACETATE 667 MG/1
667 TABLET ORAL DAILY
COMMUNITY

## 2025-08-05 ASSESSMENT — FIBROSIS 4 INDEX: FIB4 SCORE: 3.02

## (undated) DEVICE — INSTRUMENT JAWCOVER SUTURE - BOOTS (5PK/BX)

## (undated) DEVICE — CANNULA O2 COMFORT SOFT EAR ADULT 7 FT TUBING (50/CA)

## (undated) DEVICE — SUCTION INSTRUMENT YANKAUER BULBOUS TIP W/O VENT (50EA/CA)

## (undated) DEVICE — ELECTRODE 850 FOAM ADHESIVE - HYDROGEL RADIOTRNSPRNT (50/PK)

## (undated) DEVICE — NEPTUNE 4 PORT MANIFOLD - (20/PK)

## (undated) DEVICE — SENSOR SPO2 NEO LNCS ADHESIVE (20/BX) SEE USER NOTES

## (undated) DEVICE — CHLORAPREP 26 ML APPLICATOR - ORANGE TINT(25/CA)

## (undated) DEVICE — SUTURE 4-0 MONOCRYL PLUS PS-2 - 27 INCH (36/BX)

## (undated) DEVICE — PADDING CAST 4 IN STERILE - 4 X 4 YDS (24/CA)

## (undated) DEVICE — SUTURE 3-0 ETHILON PS-1 (36PK/BX)

## (undated) DEVICE — GLOVE BIOGEL SZ 7 SURGICAL PF LTX - (50PR/BX 4BX/CA)

## (undated) DEVICE — SPONGE GAUZESTER 4 X 4 4PLY - (128PK/CA)

## (undated) DEVICE — MASK ANESTHESIA ADULT  - (100/CA)

## (undated) DEVICE — GOWN WARMING STANDARD FLEX - (30/CA)

## (undated) DEVICE — PACK MAJOR ORTHO - (2EA/CA)

## (undated) DEVICE — ELECTRODE DUAL RETURN W/ CORD - (50/PK)

## (undated) DEVICE — SODIUM CHL. INJ. 0.9% 500ML (24EA/CA 50CA/PF)

## (undated) DEVICE — DRAPE IOBAN II INCISE 23X17 - (10EA/BX 4BX/CA)

## (undated) DEVICE — CONTAINER SPECIMEN BAG OR - STERILE 4 OZ W/LID (100EA/CA)

## (undated) DEVICE — CANISTER SUCTION 3000ML MECHANICAL FILTER AUTO SHUTOFF MEDI-VAC NONSTERILE LF DISP  (40EA/CA)

## (undated) DEVICE — DRAPE 36X28IN RAD CARM BND BG - (25/CA) O

## (undated) DEVICE — DRAPE SURGICAL U 77X120 - (10/CA)

## (undated) DEVICE — GLOVE BIOGEL INDICATOR SZ 7.5 SURGICAL PF LTX - (50PR/BX 4BX/CA)

## (undated) DEVICE — PROTECTOR ULNA NERVE - (36PR/CA)

## (undated) DEVICE — PACK LOWER EXTREMITY - (2/CA)

## (undated) DEVICE — SUTURE 3-0 VICRYL PLUS SH - 8X 18 INCH (12/BX)

## (undated) DEVICE — MASK, LARYNGEAL AIRWAY #4

## (undated) DEVICE — SET LEADWIRE 5 LEAD BEDSIDE DISPOSABLE ECG (1SET OF 5/EA)

## (undated) DEVICE — PAD PREP 24 X 48 CUFFED - (100/CA)

## (undated) DEVICE — DRESSING 3X8 ADAPTIC GAUZE - NON-ADHERING (36/PK 6PK/BX)

## (undated) DEVICE — SODIUM CHL IRRIGATION 0.9% 1000ML (12EA/CA)

## (undated) DEVICE — PAD LAP STERILE 18 X 18 - (5/PK 40PK/CA)

## (undated) DEVICE — TOWEL STOP TIMEOUT SAFETY FLAG (40EA/CA)

## (undated) DEVICE — TUBING CLEARLINK DUO-VENT - C-FLO (48EA/CA)

## (undated) DEVICE — TUBE CONNECTING SUCTION - CLEAR PLASTIC STERILE 72 IN (50EA/CA)

## (undated) DEVICE — Device

## (undated) DEVICE — GLOVE BIOGEL SZ 7.5 SURGICAL PF LTX - (50PR/BX 4BX/CA)

## (undated) DEVICE — BLADE SAGITTAL SAW 9.4MM X 25.5MM X .4MM FINE TOOTH (1/EA)

## (undated) DEVICE — LACTATED RINGERS INJ 1000 ML - (14EA/CA 60CA/PF)

## (undated) DEVICE — COVER LIGHT HANDLE ALC PLUS DISP (18EA/BX)

## (undated) DEVICE — NEEDLE DAVIS TONSIL 1/2 CIR - (2EA/PK20PK/BX)

## (undated) DEVICE — MASK OXYGEN VNYL ADLT MED CONC WITH 7 FOOT TUBING - (50EA/CA)

## (undated) DEVICE — SLEEVE, VASO, THIGH, MED

## (undated) DEVICE — SWAB CULTURE AMIES ESWAB (50EA/PK)

## (undated) DEVICE — SUTURE 2-0 MONOCRYL SH

## (undated) DEVICE — SET EXTENSION WITH 2 PORTS (48EA/CA) ***PART #2C8610 IS A SUBSTITUTE*****

## (undated) DEVICE — CANISTER SUCTION 3000ML MECHANICAL FILTER AUTO SHUTOFF MEDI-VAC NONSTERILE LF DISP (40EA/CA)

## (undated) DEVICE — TOWELS CLOTH SURGICAL - (4/PK 20PK/CA)

## (undated) DEVICE — SLEEVE VASO DVT COMPRESSION CALF MED - (10PR/CA)

## (undated) DEVICE — DRAPE U SPLIT IMP 54 X 76 - (24/CA)

## (undated) DEVICE — GLOVE BIOGEL SZ 6 PF LATEX - (50EA/BX 4BX/CA)

## (undated) DEVICE — SENSOR OXIMETER ADULT SPO2 RD SET (20EA/BX)

## (undated) DEVICE — DRESSING TRANSPARENT FILM TEGADERM 2.375 X 2.75" (100EA/BX)"

## (undated) DEVICE — GLOVE SZ 7.5 BIOGEL PI MICRO - PF LF (50PR/BX)

## (undated) DEVICE — GLOVE BIOGEL PI INDICATOR SZ 7.5 SURGICAL PF LF -(50/BX 4BX/CA)

## (undated) DEVICE — BANDAGE ELASTIC 4 HONEYCOMB - 4"X5YD LF (20/CA)"

## (undated) DEVICE — GLOVE BIOGEL SZ 6.5 SURGICAL PF LTX (50PR/BX 4BX/CA)

## (undated) DEVICE — DRAPE LARGE 3 QUARTER - (20/CA)

## (undated) DEVICE — SUTURE 2-0 VICRYL PLUS CT-1 36 (36PK/BX)"

## (undated) DEVICE — SPONGE GAUZE NON-STERILE 2X2 - 4-PLY (200/PK 40PK/CA)

## (undated) DEVICE — BLOCK

## (undated) DEVICE — GOWN SURGEONS X-LARGE - DISP. (30/CA)

## (undated) DEVICE — WATER IRRIG. STER. 1500 ML - (9/CA)

## (undated) DEVICE — SUTURE 5 ETHIBOND V-37 (12PK/BX)

## (undated) DEVICE — SUTURE 0 VICRYL PLUS CT-1 - 36 INCH (36/BX)

## (undated) DEVICE — CUP DENTURE W/ LID - (200/CA)

## (undated) DEVICE — COVER LIGHT HANDLE FLEXIBLE - SOFT (2EA/PK 80PK/CA)

## (undated) DEVICE — SUTURE 4-0 SILK 12 X 18 INCH - (36/BX)

## (undated) DEVICE — GLOVE BIOGEL PI INDICATOR SZ 8.0 SURGICAL PF LF -(50/BX 4BX/CA)

## (undated) DEVICE — SUTURE GENERAL

## (undated) DEVICE — POUCH FLUID COLLECTION INVISISHIELD - (10/BX)

## (undated) DEVICE — GLOVE SZ 8 BIOGEL PI MICRO - PF LF (50PR/BX)

## (undated) DEVICE — SUTURE 2-0 ETHILON FS - (36/BX) 18 INCH

## (undated) DEVICE — SYRINGE 30 ML LL (56/BX)

## (undated) DEVICE — CONTAINER, SPECIMEN, STERILE

## (undated) DEVICE — GLOVE BIOGEL PI INDICATOR SZ 6.5 SURGICAL PF LF - (50/BX 4BX/CA)

## (undated) DEVICE — SUTURE 7-0 PROLENE BV-1 D/A 24 (36PK/BX)"

## (undated) DEVICE — TRAY SRGPRP PVP IOD WT PRP - (20/CA)

## (undated) DEVICE — SLING ORTH UNV TIETX VLFM ARM

## (undated) DEVICE — GLOVE SZ 6.5 BIOGEL PI MICRO - PF LF (50PR/BX)

## (undated) DEVICE — BLADE SURGICAL #15 - (50/BX 3BX/CA)

## (undated) DEVICE — STOCKINETTE IMPERVIOUS 12X48 - STERILELF (10/CA)"

## (undated) DEVICE — SUTURE 2-0 VICRYL PLUS CT-1 - 8 X 18 INCH(12/BX)

## (undated) DEVICE — DRESSING ABDOMINAL PAD STERILE 8 X 10" (360EA/CA)"

## (undated) DEVICE — SUTURE 3-0 VICRYL PLUS SH - 27 INCH (36/BX)

## (undated) DEVICE — GLOVE BIOGEL PI ORTHO SZ 8 PF LF (40PR/BX)

## (undated) DEVICE — SUTURE 0 VICRYL PLUS CT-1 - 8 X 18 INCH (12/BX)

## (undated) DEVICE — BANDAGE ELASTIC STERILE MATRIX 6 X 10 (20EA/CA)

## (undated) DEVICE — GLOVE BIOGEL PI ORTHO SZ 7.5 PF LF (40PR/BX)

## (undated) DEVICE — KIT ANESTHESIA W/CIRCUIT & 3/LT BAG W/FILTER (20EA/CA)

## (undated) DEVICE — SUTURE 3-0 SILK 12 X 18 IN - (36/BX)

## (undated) DEVICE — GLOVE BIOGEL PI INDICATOR SZ 6.0 SURGICAL PF LF -(200PR/CA)

## (undated) DEVICE — GLOVE BIOGEL SZ 8 SURGICAL PF LTX - (50PR/BX 4BX/CA)

## (undated) DEVICE — BLADE SURGICAL #10 - (50/BX)

## (undated) DEVICE — GLOVE BIOGEL PI INDICATOR SZ 7.0 SURGICAL PF LF - (50/BX 4BX/CA)

## (undated) DEVICE — GLOVE BIOGEL INDICATOR SZ 8 SURGICAL PF LTX - (50/BX 4BX/CA)

## (undated) DEVICE — CANISTER SUCTION RIGID RED 1500CC (40EA/CA)

## (undated) DEVICE — GLOVE BIOGEL INDICATOR SZ 6.5 SURGICAL PF LTX - (50PR/BX 4BX/CA)

## (undated) DEVICE — BANDAGE STERILE 2 IN X 75 IN (12EA/BX 8BX/CA)

## (undated) DEVICE — WATER IRRIGATION STERILE 1000ML (12EA/CA)

## (undated) DEVICE — HEAD HOLDER JUNIOR/ADULT

## (undated) DEVICE — WRAP CO-FLEX 4IN X 5YD STERIL - SELF-ADHERENT (18/CA)

## (undated) DEVICE — BIT DRILL DIA2.5MM FOR AXSOS 3 TITANIUM LOCKING PLATE SYSTEM

## (undated) DEVICE — MEDICINE CUP STERILE 2 OZ - (100/CA)

## (undated) DEVICE — PADDING CAST 6 IN X 4 YDS - SOF-ROLL (6RL/BG 6BG/CA)

## (undated) DEVICE — PACK AV FISTULA (2EA/CA)

## (undated) DEVICE — SYRINGE SAFETY TB 1 ML 27 GA X 1/2 IN (100/BX 4BX/CA)

## (undated) DEVICE — GLOVE SIZE 6.5 SURGEON ACCELERATOR FREE GREEN (50PR/BX)

## (undated) DEVICE — GLOVE BIOGEL ECLIPSE  PF LATEX SIZE 6.5 (50PR/BX)

## (undated) DEVICE — STOCKINET BIAS 6 IN STERILE - (20/CA)

## (undated) DEVICE — GLOVE SZ 7 BIOGEL PI MICRO - PF LF (50PR/BX 4BX/CA)

## (undated) DEVICE — DRAPE MAYO STAND - (30/CA)

## (undated) DEVICE — KIT  I.V. START (100EA/CA)

## (undated) DEVICE — STAPLER 35MM SKIN WIDE ROTATING HEAD (6EA/BX)

## (undated) DEVICE — SUCTION INSTRUMENT YANKAUER OPEN TIP W/O VENT (50EA/CA)

## (undated) DEVICE — TOURNIQUET CUFF 24 X 4 ONE PORT - STERILE (10/BX)

## (undated) DEVICE — BANDAGE ELASTIC 4 IN X 5 YDS - LATEX FREE(10/BX 5BX/CA)

## (undated) DEVICE — KIT ROOM DECONTAMINATION

## (undated) DEVICE — SUTURE 3-0 ETHILON FS-1 - (36/BX) 30 INCH

## (undated) DEVICE — SUTURE ETHILON 2-0 FSLX 30 (36PK/BX)"

## (undated) DEVICE — GLOVE BIOGEL ECLIPSE PF LATEX SIZE 8.0  (50PR/BX)

## (undated) DEVICE — CATHETER TROCAR 20FR. (10/CA)

## (undated) DEVICE — DRILL BIT LOCKING SHORT 3.1X216MM

## (undated) DEVICE — PADDING CAST 6 IN STERILE - 6 X 4 YDS (24/CA)

## (undated) DEVICE — WRAP COBAN SELF-ADHERENT 6 IN X  5YDS STERILE TAN (12/CA)

## (undated) DEVICE — BANDAGE ELASTIC 6 HONEYCOMB - 6X5YD LF (20/CA)"

## (undated) DEVICE — SPLINT PLASTER 5 IN X 30 IN - (50EA/BX 6BX/CA)

## (undated) DEVICE — STAPLER SKIN DISP - (6/BX 10BX/CA) VISISTAT

## (undated) DEVICE — BLADE SAGITTAL SAW DUAL CUT 25.0 X 90.0 X 1.27MM (1/EA)